# Patient Record
Sex: MALE | Race: WHITE | NOT HISPANIC OR LATINO | Employment: OTHER | URBAN - METROPOLITAN AREA
[De-identification: names, ages, dates, MRNs, and addresses within clinical notes are randomized per-mention and may not be internally consistent; named-entity substitution may affect disease eponyms.]

---

## 2017-01-03 RX ORDER — TRIAMTERENE AND HYDROCHLOROTHIAZIDE 37.5; 25 MG/1; MG/1
CAPSULE ORAL
Qty: 30 CAPSULE | Refills: 2 | Status: SHIPPED | OUTPATIENT
Start: 2017-01-03 | End: 2017-04-17 | Stop reason: SDUPTHER

## 2017-01-03 RX ORDER — PRAVASTATIN SODIUM 40 MG
TABLET ORAL
Qty: 30 TABLET | Refills: 2 | Status: SHIPPED | OUTPATIENT
Start: 2017-01-03 | End: 2017-04-03 | Stop reason: SDUPTHER

## 2017-01-16 ENCOUNTER — TELEPHONE (OUTPATIENT)
Dept: INTERNAL MEDICINE | Facility: CLINIC | Age: 82
End: 2017-01-16

## 2017-01-16 RX ORDER — EMPAGLIFLOZIN 25 MG/1
TABLET, FILM COATED ORAL
Qty: 30 TABLET | Refills: 0 | Status: SHIPPED | OUTPATIENT
Start: 2017-01-16 | End: 2017-05-08

## 2017-01-24 RX ORDER — ALLOPURINOL 300 MG/1
TABLET ORAL
Qty: 30 TABLET | Refills: 1 | Status: SHIPPED | OUTPATIENT
Start: 2017-01-24 | End: 2017-04-17 | Stop reason: SDUPTHER

## 2017-02-13 RX ORDER — SACCHAROMYCES BOULARDII 250 MG
CAPSULE ORAL
Qty: 60 CAPSULE | Refills: 3 | Status: SHIPPED | OUTPATIENT
Start: 2017-02-13 | End: 2019-07-12 | Stop reason: SDUPTHER

## 2017-02-20 ENCOUNTER — LAB (OUTPATIENT)
Dept: LAB | Facility: HOSPITAL | Age: 82
End: 2017-02-20

## 2017-02-20 DIAGNOSIS — C61 PROSTATE CANCER (HCC): ICD-10-CM

## 2017-02-20 LAB
ALBUMIN SERPL-MCNC: 4.4 G/DL (ref 3.2–4.8)
ALBUMIN/GLOB SERPL: 1.7 G/DL (ref 1.5–2.5)
ALP SERPL-CCNC: 31 U/L (ref 25–100)
ALT SERPL W P-5'-P-CCNC: 28 U/L (ref 7–40)
ANION GAP SERPL CALCULATED.3IONS-SCNC: 8 MMOL/L (ref 3–11)
AST SERPL-CCNC: 30 U/L (ref 0–33)
BASOPHILS # BLD AUTO: 0.02 10*3/MM3 (ref 0–0.2)
BASOPHILS NFR BLD AUTO: 0.4 % (ref 0–1)
BILIRUB SERPL-MCNC: 0.7 MG/DL (ref 0.3–1.2)
BUN BLD-MCNC: 17 MG/DL (ref 9–23)
BUN/CREAT SERPL: 15.5 (ref 7–25)
CALCIUM SPEC-SCNC: 9.7 MG/DL (ref 8.7–10.4)
CHLORIDE SERPL-SCNC: 103 MMOL/L (ref 99–109)
CO2 SERPL-SCNC: 31 MMOL/L (ref 20–31)
CREAT BLD-MCNC: 1.1 MG/DL (ref 0.6–1.3)
DEPRECATED RDW RBC AUTO: 45.9 FL (ref 37–54)
EOSINOPHIL # BLD AUTO: 0.34 10*3/MM3 (ref 0.1–0.3)
EOSINOPHIL NFR BLD AUTO: 6.1 % (ref 0–3)
ERYTHROCYTE [DISTWIDTH] IN BLOOD BY AUTOMATED COUNT: 13.8 % (ref 11.3–14.5)
GFR SERPL CREATININE-BSD FRML MDRD: 65 ML/MIN/1.73
GLOBULIN UR ELPH-MCNC: 2.6 GM/DL
GLUCOSE BLD-MCNC: 185 MG/DL (ref 70–100)
HCT VFR BLD AUTO: 45.2 % (ref 38.9–50.9)
HGB BLD-MCNC: 15.1 G/DL (ref 13.1–17.5)
IMM GRANULOCYTES # BLD: 0.03 10*3/MM3 (ref 0–0.03)
IMM GRANULOCYTES NFR BLD: 0.5 % (ref 0–0.6)
LYMPHOCYTES # BLD AUTO: 1.98 10*3/MM3 (ref 0.6–4.8)
LYMPHOCYTES NFR BLD AUTO: 35.5 % (ref 24–44)
MCH RBC QN AUTO: 30.8 PG (ref 27–31)
MCHC RBC AUTO-ENTMCNC: 33.4 G/DL (ref 32–36)
MCV RBC AUTO: 92.2 FL (ref 80–99)
MONOCYTES # BLD AUTO: 0.49 10*3/MM3 (ref 0–1)
MONOCYTES NFR BLD AUTO: 8.8 % (ref 0–12)
NEUTROPHILS # BLD AUTO: 2.71 10*3/MM3 (ref 1.5–8.3)
NEUTROPHILS NFR BLD AUTO: 48.7 % (ref 41–71)
PLATELET # BLD AUTO: 185 10*3/MM3 (ref 150–450)
PMV BLD AUTO: 10.6 FL (ref 6–12)
POTASSIUM BLD-SCNC: 3.7 MMOL/L (ref 3.5–5.5)
PROT SERPL-MCNC: 7 G/DL (ref 5.7–8.2)
PSA SERPL-MCNC: 8.3 NG/ML (ref 0–4)
RBC # BLD AUTO: 4.9 10*6/MM3 (ref 4.2–5.76)
SODIUM BLD-SCNC: 142 MMOL/L (ref 132–146)
WBC NRBC COR # BLD: 5.57 10*3/MM3 (ref 3.5–10.8)

## 2017-02-20 PROCEDURE — 84153 ASSAY OF PSA TOTAL: CPT | Performed by: INTERNAL MEDICINE

## 2017-02-20 PROCEDURE — 36415 COLL VENOUS BLD VENIPUNCTURE: CPT

## 2017-02-20 PROCEDURE — 85025 COMPLETE CBC W/AUTO DIFF WBC: CPT | Performed by: INTERNAL MEDICINE

## 2017-02-20 PROCEDURE — 80053 COMPREHEN METABOLIC PANEL: CPT | Performed by: INTERNAL MEDICINE

## 2017-02-22 ENCOUNTER — OFFICE VISIT (OUTPATIENT)
Dept: ONCOLOGY | Facility: CLINIC | Age: 82
End: 2017-02-22

## 2017-02-22 VITALS
HEART RATE: 83 BPM | SYSTOLIC BLOOD PRESSURE: 131 MMHG | TEMPERATURE: 97.2 F | WEIGHT: 211 LBS | DIASTOLIC BLOOD PRESSURE: 62 MMHG | RESPIRATION RATE: 16 BRPM | BODY MASS INDEX: 34.06 KG/M2

## 2017-02-22 DIAGNOSIS — C61 PROSTATE CANCER (HCC): Primary | ICD-10-CM

## 2017-02-22 PROCEDURE — 99214 OFFICE O/P EST MOD 30 MIN: CPT | Performed by: INTERNAL MEDICINE

## 2017-02-22 NOTE — PROGRESS NOTES
DATE OF VISIT: 2/22/2017     REASON FOR VISIT:   1. Prostate cancer. Presented with stage I, [id number], PSA at diagnosis 3 status  post prostatectomy in 2000.  2. Relapsed disease with rise in PSA gradually over the last couple of years,  most recent PSA 7.1 ng/mL on 01/25/2016.      HISTORY OF PRESENT ILLNESS: The patient is a very pleasant 80-year-old  gentleman with past medical history significant for prostate cancer status post  radical prostatectomy in 2000. The patient never received adjuvant treatments,  neither radiation, GnRH or antiandrogen treatment. The patient's PSA started  to go up gradually over the last 2 years. Patient was referred to me on  06/13/2014 and I did restaging workup that came back negative including bone  scan, CAT scans chest, abdomen and pelvis. We elected to proceed with watchful  waiting. Patient is here today in scheduled followup visit.     SUBJECTIVE: The patient has been doing fairly well. He denied any fever or  chills. He denied any night sweats. He denied any diarrhea. No headaches. No  abdominal pain. No problem urinating.      REVIEW OF SYSTEMS: All the other 9 systems are reviewed by me and negative  except what is mentioned in HPI.      PAST MEDICAL HISTORY/SOCIAL HISTORY/FAMILY HISTORY: Unchanged from my prior  documentation on 06/13/2014.         Current Outpatient Prescriptions:   •  allopurinol (ZYLOPRIM) 300 MG tablet, TAKE ONE TABLET BY MOUTH DAILY, Disp: 30 tablet, Rfl: 1  •  aspirin 325 MG tablet, Take  by mouth 1 (one) time per week., Disp: , Rfl:   •  Cholecalciferol (VITAMIN D3) 2000 UNITS capsule, Take  by mouth 2 (two) times a day., Disp: , Rfl:   •  Cinnamon 500 MG capsule, Take 2,000 mg by mouth 2 (two) times a day., Disp: , Rfl:   •  DUREZOL 0.05 % ophthalmic emulsion, , Disp: , Rfl:   •  fenofibric acid (TRILIPIX) 135 MG capsule delayed-release delayed release capsule, Take 1 capsule by mouth daily., Disp: 90 capsule, Rfl: 1  •  FLORASTOR 250 MG capsule,  TAKE ONE CAPSULE BY MOUTH TWICE A DAY, Disp: 60 capsule, Rfl: 3  •  folic acid (FOLVITE) 400 MCG tablet, Take  by mouth 1 (one) time per week., Disp: , Rfl:   •  ILEVRO 0.3 % suspension, , Disp: , Rfl:   •  JANUVIA 100 MG tablet, TAKE ONE TABLET BY MOUTH DAILY, Disp: 30 tablet, Rfl: 3  •  JARDIANCE 25 MG tablet, TAKE ONE TABLET BY MOUTH DAILY, Disp: 30 tablet, Rfl: 0  •  l-methylfolate-algae-B6-B12 (METANX) 3-90.314-2-35 MG capsule capsule, Take  by mouth., Disp: , Rfl:   •  metFORMIN (GLUCOPHAGE) 500 MG tablet, Take 2 tablets by mouth 2 (Two) Times a Day With Meals., Disp: 120 tablet, Rfl: 2  •  metoprolol succinate XL (TOPROL-XL) 100 MG 24 hr tablet, Take 1 tablet by mouth daily., Disp: 90 tablet, Rfl: 1  •  mometasone (ELOCON) 0.1 % ointment, , Disp: , Rfl:   •  omega-3 acid ethyl esters (LOVAZA) 1 G capsule, Take two capsules by mouth twice a day, Disp: 120 capsule, Rfl: 3  •  pravastatin (PRAVACHOL) 40 MG tablet, TAKE ONE TABLET BY MOUTH EVERY NIGHT AT BEDTIME, Disp: 30 tablet, Rfl: 2  •  triamterene-hydrochlorothiazide (DYAZIDE) 37.5-25 MG per capsule, TAKE ONE CAPSULE BY MOUTH DAILY, Disp: 30 capsule, Rfl: 2  •  VIGAMOX 0.5 % ophthalmic solution, , Disp: , Rfl:     PHYSICAL EXAMINATION:   Visit Vitals   • /62   • Pulse 83   • Temp 97.2 °F (36.2 °C) (Temporal Artery )   • Resp 16   • Wt 211 lb (95.7 kg)   • BMI 34.06 kg/m2     ECOG1  GENERAL: Age appropriate. No acute distress.   HEENT: Head atraumatic, normocephalic.   NECK: Supple. No JVD. No lymphadenopathy.   LUNGS: Clear to auscultation bilaterally. No wheezing. No rhonchi.   HEART: Regular rate and rhythm. S1, S2, no murmurs.   ABDOMEN: Soft, nontender, nondistended. Bowel sounds positive. No  hepatosplenomegaly.   EXTREMITIES: No clubbing, cyanosis, or edema.   SKIN: No rashes. No purpura.   NEUROLOGIC: Awake and oriented x3. Strength 5 out of 5 in all muscle groups.     Lab on 02/20/2017   Component Date Value Ref Range Status   • Glucose  02/20/2017 185* 70 - 100 mg/dL Final   • BUN 02/20/2017 17  9 - 23 mg/dL Final   • Creatinine 02/20/2017 1.10  0.60 - 1.30 mg/dL Final   • Sodium 02/20/2017 142  132 - 146 mmol/L Final   • Potassium 02/20/2017 3.7  3.5 - 5.5 mmol/L Final   • Chloride 02/20/2017 103  99 - 109 mmol/L Final   • CO2 02/20/2017 31.0  20.0 - 31.0 mmol/L Final   • Calcium 02/20/2017 9.7  8.7 - 10.4 mg/dL Final   • Total Protein 02/20/2017 7.0  5.7 - 8.2 g/dL Final   • Albumin 02/20/2017 4.40  3.20 - 4.80 g/dL Final   • ALT (SGPT) 02/20/2017 28  7 - 40 U/L Final   • AST (SGOT) 02/20/2017 30  0 - 33 U/L Final   • Alkaline Phosphatase 02/20/2017 31  25 - 100 U/L Final   • Total Bilirubin 02/20/2017 0.7  0.3 - 1.2 mg/dL Final   • eGFR Non  Amer 02/20/2017 65  >60 mL/min/1.73 Final   • Globulin 02/20/2017 2.6  gm/dL Final   • A/G Ratio 02/20/2017 1.7  1.5 - 2.5 g/dL Final   • BUN/Creatinine Ratio 02/20/2017 15.5  7.0 - 25.0 Final   • Anion Gap 02/20/2017 8.0  3.0 - 11.0 mmol/L Final   • PSA 02/20/2017 8.300* 0.000 - 4.000 ng/mL Final   • WBC 02/20/2017 5.57  3.50 - 10.80 10*3/mm3 Final   • RBC 02/20/2017 4.90  4.20 - 5.76 10*6/mm3 Final   • Hemoglobin 02/20/2017 15.1  13.1 - 17.5 g/dL Final   • Hematocrit 02/20/2017 45.2  38.9 - 50.9 % Final   • MCV 02/20/2017 92.2  80.0 - 99.0 fL Final   • MCH 02/20/2017 30.8  27.0 - 31.0 pg Final   • MCHC 02/20/2017 33.4  32.0 - 36.0 g/dL Final   • RDW 02/20/2017 13.8  11.3 - 14.5 % Final   • RDW-SD 02/20/2017 45.9  37.0 - 54.0 fl Final   • MPV 02/20/2017 10.6  6.0 - 12.0 fL Final   • Platelets 02/20/2017 185  150 - 450 10*3/mm3 Final   • Neutrophil % 02/20/2017 48.7  41.0 - 71.0 % Final   • Lymphocyte % 02/20/2017 35.5  24.0 - 44.0 % Final   • Monocyte % 02/20/2017 8.8  0.0 - 12.0 % Final   • Eosinophil % 02/20/2017 6.1* 0.0 - 3.0 % Final   • Basophil % 02/20/2017 0.4  0.0 - 1.0 % Final   • Immature Grans % 02/20/2017 0.5  0.0 - 0.6 % Final   • Neutrophils, Absolute 02/20/2017 2.71  1.50 - 8.30  10*3/mm3 Final   • Lymphocytes, Absolute 02/20/2017 1.98  0.60 - 4.80 10*3/mm3 Final   • Monocytes, Absolute 02/20/2017 0.49  0.00 - 1.00 10*3/mm3 Final   • Eosinophils, Absolute 02/20/2017 0.34* 0.10 - 0.30 10*3/mm3 Final   • Basophils, Absolute 02/20/2017 0.02  0.00 - 0.20 10*3/mm3 Final   • Immature Grans, Absolute 02/20/2017 0.03  0.00 - 0.03 10*3/mm3 Final        ASSESSMENT: The patient is a very pleasant 80-year-old gentleman with relapsed  prostate cancer.     PROBLEM LIST:  1. Prostate cancer, initially presented as [id number] status post radical  prostatectomy 2000.  2. Rise in PSA gradually over the last 2 years, most recently 5.4 on  05/20/2014.  3. Multiple comorbidities including type 2 diabetes, hypertension, morbid  obesity, osteoarthritis, and hypercholesterolemia.  4. Erectile dysfunction.   5.  Family history of colon cancer     PLAN:  1. I did go over the results in detail with the patient. I explained to him that  his his PSA is a little higher at 8.3.  2. The patient will come back to see me in 3 months with repeat blood work as  well as serum PSA.   3. I will continue Viagra as needed for erectile dysfunction.   4.  We'll continue Januvia as well as metformin for type 2 diabetes  5.  We'll continue pravastatin for hypercholesterolemia  6.  Patient had his colonoscopy last week no polyps were found.  Patient need to have 5 year follow-up study which would be due 11/2021.  7.  I will start patient on treatment if his PSA goes above 10.  He will need to have staging workup with CAT scan abdomen and pelvis as well as bone scan.  Patient will be started on Lupron with Casodex bridge.    Shannan Ramon MD  2/22/2017

## 2017-03-14 RX ORDER — FENOFIBRIC ACID 135 MG/1
CAPSULE, DELAYED RELEASE ORAL
Qty: 30 CAPSULE | Refills: 0 | Status: SHIPPED | OUTPATIENT
Start: 2017-03-14 | End: 2017-04-17 | Stop reason: SDUPTHER

## 2017-03-23 ENCOUNTER — HOSPITAL ENCOUNTER (OUTPATIENT)
Dept: CT IMAGING | Facility: HOSPITAL | Age: 82
Discharge: HOME | End: 2017-03-23
Attending: INTERNAL MEDICINE | Admitting: INTERNAL MEDICINE

## 2017-03-23 ENCOUNTER — OFFICE VISIT (OUTPATIENT)
Dept: INTERNAL MEDICINE | Facility: CLINIC | Age: 82
End: 2017-03-23

## 2017-03-23 VITALS
WEIGHT: 210 LBS | OXYGEN SATURATION: 96 % | HEART RATE: 78 BPM | DIASTOLIC BLOOD PRESSURE: 70 MMHG | BODY MASS INDEX: 33.89 KG/M2 | SYSTOLIC BLOOD PRESSURE: 118 MMHG

## 2017-03-23 DIAGNOSIS — G47.33 OBSTRUCTIVE SLEEP APNEA SYNDROME: ICD-10-CM

## 2017-03-23 DIAGNOSIS — R10.9 LEFT FLANK PAIN: ICD-10-CM

## 2017-03-23 DIAGNOSIS — C61 PROSTATE CANCER (HCC): ICD-10-CM

## 2017-03-23 DIAGNOSIS — I10 ESSENTIAL HYPERTENSION: ICD-10-CM

## 2017-03-23 DIAGNOSIS — Z00.00 MEDICARE ANNUAL WELLNESS VISIT, SUBSEQUENT: Primary | ICD-10-CM

## 2017-03-23 LAB — CREAT BLDA-MCNC: 1.3 MG/DL (ref 0.6–1.3)

## 2017-03-23 PROCEDURE — G0439 PPPS, SUBSEQ VISIT: HCPCS | Performed by: INTERNAL MEDICINE

## 2017-03-23 PROCEDURE — 74177 CT ABD & PELVIS W/CONTRAST: CPT

## 2017-03-23 PROCEDURE — G0444 DEPRESSION SCREEN ANNUAL: HCPCS | Performed by: INTERNAL MEDICINE

## 2017-03-23 PROCEDURE — 0 IOPAMIDOL 61 % SOLUTION: Performed by: INTERNAL MEDICINE

## 2017-03-23 PROCEDURE — 82565 ASSAY OF CREATININE: CPT

## 2017-03-23 PROCEDURE — 99397 PER PM REEVAL EST PAT 65+ YR: CPT | Performed by: INTERNAL MEDICINE

## 2017-03-23 PROCEDURE — 96160 PT-FOCUSED HLTH RISK ASSMT: CPT | Performed by: INTERNAL MEDICINE

## 2017-03-23 RX ORDER — EMPAGLIFLOZIN 10 MG/1
10 TABLET, FILM COATED ORAL DAILY
COMMUNITY
Start: 2017-01-16 | End: 2020-12-01 | Stop reason: SDUPTHER

## 2017-03-23 RX ADMIN — BARIUM SULFATE 450 ML: 21 SUSPENSION ORAL at 12:00

## 2017-03-23 RX ADMIN — IOPAMIDOL 95 ML: 612 INJECTION, SOLUTION INTRAVENOUS at 13:20

## 2017-03-23 NOTE — PROGRESS NOTES
ANNUAL WELLNESS VISIT    DRUG AND ALCOHOL USE      caffeine intake: 3 cups of caffeinated coffee per day    DIET AND PHYSICAL ACTIVITY     Diet: general    Exercise: rarely   Exercise Details: does not exercise     MOOD DISORDER AND COGNITIVE SCREENING   Depression Screening Tool Used yes - see PHQ-9   Anxiety Screening Tool Used yes     Mini-Cog Performed   Yes    1. Tell Patient 3 Words apple table precious    2. Administer Clock Test     3. Recall 3 words  apple table precious    4. Number Correct Items 3    FUNCTIONAL ABILITY AND LEVEL OF SAFETY   Hearing no hearing loss     Wears Hearing Aids No       Current Activities Independent      none  - see Funct/Cog Status Intake     Fall Risk Assessment       Has difficulty with walking or balance  Yes         Timed Up and Go (TUG) Test  9 sec.       If >12 sec, normal    ADVANCED DIRECTIVE has an advanced directive - a copy has been provided and is in file    PAIN SCREENING Do you have pain right now? no      If so, 1-10 scale:      \     Do you have pain every day? No      Probable chronic pain: No     Recent Hospitalizations:  No recent hospitalization(s)..     MEDICATION REVIEW   - updated and reviewed (see Medication List).   - reviewed for potentially harmful drug-disease interactions in the elderly.   - reviewed for high risk medications in the elderly.   - aspirin use: Yes             Chief Complaint   Patient presents with   • Subsequent Medicare Wellness       History of Present Illness  81 y.o.  presents for wellness exam  Left flank pain for weeks. No nausea or vomiting. No fever. Hx of prostate cancer.    Review of Systems   Constitutional: Negative for activity change, appetite change, chills, diaphoresis, fatigue, fever and unexpected weight change.   HENT: Negative for congestion, ear discharge, ear pain, mouth sores, nosebleeds, sinus pressure, sneezing and sore throat.    Eyes: Negative for pain, discharge and itching.   Respiratory: Negative for cough,  chest tightness, shortness of breath and wheezing.    Cardiovascular: Negative for chest pain, palpitations and leg swelling.   Gastrointestinal: Negative for abdominal pain, constipation, diarrhea, nausea and vomiting.   Endocrine: Negative for cold intolerance, heat intolerance, polydipsia and polyphagia.   Genitourinary: Negative for dysuria, flank pain, frequency, hematuria and urgency.   Musculoskeletal: Negative for arthralgias, back pain, gait problem, myalgias, neck pain and neck stiffness.        Left flank pain   Skin: Negative for color change, pallor and rash.   Neurological: Negative for seizures, speech difficulty, numbness and headaches.   Psychiatric/Behavioral: Negative for agitation, confusion, decreased concentration and sleep disturbance. The patient is not nervous/anxious.       All other ROS reviewed and negative.    [facility]  The following portions of the patient's history were reviewed and updated as appropriate: allergies, current medications, past family history, past medical history, past social history, past surgical history and problem list.      Current Outpatient Prescriptions:   •  allopurinol (ZYLOPRIM) 300 MG tablet, TAKE ONE TABLET BY MOUTH DAILY, Disp: 30 tablet, Rfl: 1  •  Cholecalciferol (VITAMIN D3) 2000 UNITS capsule, Take  by mouth 2 (two) times a day., Disp: , Rfl:   •  fenofibric acid (TRILIPIX) 135 MG capsule delayed-release delayed release capsule, TAKE ONE CAPSULE BY MOUTH DAILY, Disp: 30 capsule, Rfl: 0  •  FLORASTOR 250 MG capsule, TAKE ONE CAPSULE BY MOUTH TWICE A DAY, Disp: 60 capsule, Rfl: 3  •  JANUVIA 100 MG tablet, TAKE ONE TABLET BY MOUTH DAILY, Disp: 30 tablet, Rfl: 3  •  JARDIANCE 25 MG tablet, TAKE ONE TABLET BY MOUTH DAILY, Disp: 30 tablet, Rfl: 0  •  l-methylfolate-algae-B6-B12 (METANX) 3-90.314-2-35 MG capsule capsule, Take  by mouth., Disp: , Rfl:   •  metFORMIN (GLUCOPHAGE) 500 MG tablet, Take 2 tablets by mouth 2 (Two) Times a Day With Meals., Disp: 120  tablet, Rfl: 2  •  metoprolol succinate XL (TOPROL-XL) 100 MG 24 hr tablet, Take 1 tablet by mouth daily., Disp: 90 tablet, Rfl: 1  •  mometasone (ELOCON) 0.1 % ointment, , Disp: , Rfl:   •  omega-3 acid ethyl esters (LOVAZA) 1 G capsule, Take two capsules by mouth twice a day, Disp: 120 capsule, Rfl: 3  •  pravastatin (PRAVACHOL) 40 MG tablet, TAKE ONE TABLET BY MOUTH EVERY NIGHT AT BEDTIME, Disp: 30 tablet, Rfl: 2  •  triamterene-hydrochlorothiazide (DYAZIDE) 37.5-25 MG per capsule, TAKE ONE CAPSULE BY MOUTH DAILY, Disp: 30 capsule, Rfl: 2  •  aspirin 325 MG tablet, Take  by mouth 1 (one) time per week., Disp: , Rfl:   •  CRISTINA CONTOUR TEST test strip, , Disp: , Rfl:   •  Cinnamon 500 MG capsule, Take 2,000 mg by mouth Daily., Disp: , Rfl:   •  DUREZOL 0.05 % ophthalmic emulsion, , Disp: , Rfl:   •  folic acid (FOLVITE) 400 MCG tablet, Take  by mouth 1 (one) time per week., Disp: , Rfl:   •  ILEVRO 0.3 % suspension, , Disp: , Rfl:   •  JARDIANCE 10 MG tablet, , Disp: , Rfl:   •  VIGAMOX 0.5 % ophthalmic solution, , Disp: , Rfl:     VITALS:  /70  Pulse 78  Wt 210 lb (95.3 kg)  SpO2 96%  BMI 33.89 kg/m2    Physical Exam   Constitutional: He appears well-developed.   HENT:   Head: Normocephalic.   Right Ear: External ear normal.   Left Ear: External ear normal.   Nose: Nose normal.   Mouth/Throat: Oropharynx is clear and moist.   Eyes: Conjunctivae are normal. Pupils are equal, round, and reactive to light.   Neck: No JVD present. No thyromegaly present.   Cardiovascular: Normal rate, regular rhythm and normal heart sounds.  Exam reveals no friction rub.    No murmur heard.  Pulmonary/Chest: Effort normal and breath sounds normal. No respiratory distress. He has no wheezes. He has no rales.   Abdominal: Soft. He exhibits no distension. There is no tenderness. There is no guarding.   Musculoskeletal: He exhibits no edema or tenderness.   Lymphadenopathy:     He has no cervical adenopathy.   Neurological: He  displays normal reflexes. No cranial nerve deficit.   Skin: No rash noted.   Psychiatric: His behavior is normal.   Nursing note and vitals reviewed.      LABS  Results for orders placed or performed in visit on 02/20/17   Comprehensive Metabolic Panel   Result Value Ref Range    Glucose 185 (H) 70 - 100 mg/dL    BUN 17 9 - 23 mg/dL    Creatinine 1.10 0.60 - 1.30 mg/dL    Sodium 142 132 - 146 mmol/L    Potassium 3.7 3.5 - 5.5 mmol/L    Chloride 103 99 - 109 mmol/L    CO2 31.0 20.0 - 31.0 mmol/L    Calcium 9.7 8.7 - 10.4 mg/dL    Total Protein 7.0 5.7 - 8.2 g/dL    Albumin 4.40 3.20 - 4.80 g/dL    ALT (SGPT) 28 7 - 40 U/L    AST (SGOT) 30 0 - 33 U/L    Alkaline Phosphatase 31 25 - 100 U/L    Total Bilirubin 0.7 0.3 - 1.2 mg/dL    eGFR Non African Amer 65 >60 mL/min/1.73    Globulin 2.6 gm/dL    A/G Ratio 1.7 1.5 - 2.5 g/dL    BUN/Creatinine Ratio 15.5 7.0 - 25.0    Anion Gap 8.0 3.0 - 11.0 mmol/L   PSA   Result Value Ref Range    PSA 8.300 (H) 0.000 - 4.000 ng/mL   CBC Auto Differential   Result Value Ref Range    WBC 5.57 3.50 - 10.80 10*3/mm3    RBC 4.90 4.20 - 5.76 10*6/mm3    Hemoglobin 15.1 13.1 - 17.5 g/dL    Hematocrit 45.2 38.9 - 50.9 %    MCV 92.2 80.0 - 99.0 fL    MCH 30.8 27.0 - 31.0 pg    MCHC 33.4 32.0 - 36.0 g/dL    RDW 13.8 11.3 - 14.5 %    RDW-SD 45.9 37.0 - 54.0 fl    MPV 10.6 6.0 - 12.0 fL    Platelets 185 150 - 450 10*3/mm3    Neutrophil % 48.7 41.0 - 71.0 %    Lymphocyte % 35.5 24.0 - 44.0 %    Monocyte % 8.8 0.0 - 12.0 %    Eosinophil % 6.1 (H) 0.0 - 3.0 %    Basophil % 0.4 0.0 - 1.0 %    Immature Grans % 0.5 0.0 - 0.6 %    Neutrophils, Absolute 2.71 1.50 - 8.30 10*3/mm3    Lymphocytes, Absolute 1.98 0.60 - 4.80 10*3/mm3    Monocytes, Absolute 0.49 0.00 - 1.00 10*3/mm3    Eosinophils, Absolute 0.34 (H) 0.10 - 0.30 10*3/mm3    Basophils, Absolute 0.02 0.00 - 0.20 10*3/mm3    Immature Grans, Absolute 0.03 0.00 - 0.03 10*3/mm3       ASSESSMENT/PLAN  Problem List Items Addressed This Visit         Cardiovascular and Mediastinum    Essential hypertension       Genitourinary    Prostate cancer       Other    Sleep apnea      Other Visit Diagnoses     Medicare annual wellness visit, subsequent    -  Primary      Prostate cancer is being followed by [facility]. Last psa was 8. He has repeat psa verna in May.  HTN and Sleep apnea are well controlled.    FOLLOW-UP  6m

## 2017-04-03 RX ORDER — PRAVASTATIN SODIUM 40 MG
TABLET ORAL
Qty: 30 TABLET | Refills: 1 | Status: SHIPPED | OUTPATIENT
Start: 2017-04-03 | End: 2017-06-05 | Stop reason: SDUPTHER

## 2017-04-17 RX ORDER — FENOFIBRIC ACID 135 MG/1
CAPSULE, DELAYED RELEASE ORAL
Qty: 30 CAPSULE | Refills: 3 | Status: SHIPPED | OUTPATIENT
Start: 2017-04-17 | End: 2017-08-14 | Stop reason: SDUPTHER

## 2017-04-17 RX ORDER — TRIAMTERENE AND HYDROCHLOROTHIAZIDE 37.5; 25 MG/1; MG/1
CAPSULE ORAL
Qty: 30 CAPSULE | Refills: 3 | Status: SHIPPED | OUTPATIENT
Start: 2017-04-17 | End: 2017-09-25 | Stop reason: SDUPTHER

## 2017-04-17 RX ORDER — METOPROLOL SUCCINATE 100 MG/1
TABLET, EXTENDED RELEASE ORAL
Qty: 30 TABLET | Refills: 3 | Status: SHIPPED | OUTPATIENT
Start: 2017-04-17 | End: 2017-09-18 | Stop reason: SDUPTHER

## 2017-04-17 RX ORDER — ALLOPURINOL 300 MG/1
TABLET ORAL
Qty: 30 TABLET | Refills: 3 | Status: SHIPPED | OUTPATIENT
Start: 2017-04-17 | End: 2017-09-25 | Stop reason: SDUPTHER

## 2017-05-08 ENCOUNTER — OFFICE VISIT (OUTPATIENT)
Dept: CARDIOLOGY | Facility: CLINIC | Age: 82
End: 2017-05-08

## 2017-05-08 VITALS
SYSTOLIC BLOOD PRESSURE: 114 MMHG | RESPIRATION RATE: 16 BRPM | OXYGEN SATURATION: 97 % | BODY MASS INDEX: 33.59 KG/M2 | HEIGHT: 66 IN | DIASTOLIC BLOOD PRESSURE: 62 MMHG | HEART RATE: 78 BPM | WEIGHT: 209 LBS

## 2017-05-08 DIAGNOSIS — I10 ESSENTIAL HYPERTENSION: Primary | ICD-10-CM

## 2017-05-08 PROCEDURE — 99213 OFFICE O/P EST LOW 20 MIN: CPT | Performed by: INTERNAL MEDICINE

## 2017-05-08 RX ORDER — GLUCOSAMINE HCL 500 MG
2 TABLET ORAL DAILY
COMMUNITY
End: 2022-06-13

## 2017-05-08 RX ORDER — MULTIVIT WITH MINERALS/LUTEIN
1000 TABLET ORAL DAILY
COMMUNITY
End: 2019-11-04

## 2017-05-08 RX ORDER — TEMAZEPAM 15 MG/1
15 CAPSULE ORAL NIGHTLY PRN
COMMUNITY
End: 2017-07-21

## 2017-05-08 RX ORDER — DIAPER,BRIEF,ADULT, DISPOSABLE
1200 EACH MISCELLANEOUS DAILY
COMMUNITY
End: 2021-12-06

## 2017-05-08 RX ORDER — MULTIVIT WITH MINERALS/LUTEIN
1000 TABLET ORAL DAILY
COMMUNITY
End: 2019-07-12

## 2017-05-22 ENCOUNTER — LAB (OUTPATIENT)
Dept: LAB | Facility: HOSPITAL | Age: 82
End: 2017-05-22

## 2017-05-22 DIAGNOSIS — C61 PROSTATE CANCER (HCC): ICD-10-CM

## 2017-05-22 LAB
ALBUMIN SERPL-MCNC: 4.6 G/DL (ref 3.2–4.8)
ALBUMIN/GLOB SERPL: 1.8 G/DL (ref 1.5–2.5)
ALP SERPL-CCNC: 37 U/L (ref 25–100)
ALT SERPL W P-5'-P-CCNC: 24 U/L (ref 7–40)
ANION GAP SERPL CALCULATED.3IONS-SCNC: 7 MMOL/L (ref 3–11)
AST SERPL-CCNC: 23 U/L (ref 0–33)
BASOPHILS # BLD AUTO: 0.02 10*3/MM3 (ref 0–0.2)
BASOPHILS NFR BLD AUTO: 0.4 % (ref 0–1)
BILIRUB SERPL-MCNC: 0.7 MG/DL (ref 0.3–1.2)
BUN BLD-MCNC: 18 MG/DL (ref 9–23)
BUN/CREAT SERPL: 13.8 (ref 7–25)
CALCIUM SPEC-SCNC: 9.9 MG/DL (ref 8.7–10.4)
CHLORIDE SERPL-SCNC: 105 MMOL/L (ref 99–109)
CO2 SERPL-SCNC: 30 MMOL/L (ref 20–31)
CREAT BLD-MCNC: 1.3 MG/DL (ref 0.6–1.3)
DEPRECATED RDW RBC AUTO: 46.3 FL (ref 37–54)
EOSINOPHIL # BLD AUTO: 0.23 10*3/MM3 (ref 0.1–0.3)
EOSINOPHIL NFR BLD AUTO: 4.2 % (ref 0–3)
ERYTHROCYTE [DISTWIDTH] IN BLOOD BY AUTOMATED COUNT: 13.6 % (ref 11.3–14.5)
GFR SERPL CREATININE-BSD FRML MDRD: 53 ML/MIN/1.73
GLOBULIN UR ELPH-MCNC: 2.5 GM/DL
GLUCOSE BLD-MCNC: 208 MG/DL (ref 70–100)
HCT VFR BLD AUTO: 47 % (ref 38.9–50.9)
HGB BLD-MCNC: 15.4 G/DL (ref 13.1–17.5)
IMM GRANULOCYTES # BLD: 0.02 10*3/MM3 (ref 0–0.03)
IMM GRANULOCYTES NFR BLD: 0.4 % (ref 0–0.6)
LYMPHOCYTES # BLD AUTO: 1.52 10*3/MM3 (ref 0.6–4.8)
LYMPHOCYTES NFR BLD AUTO: 28 % (ref 24–44)
MCH RBC QN AUTO: 30.6 PG (ref 27–31)
MCHC RBC AUTO-ENTMCNC: 32.8 G/DL (ref 32–36)
MCV RBC AUTO: 93.3 FL (ref 80–99)
MONOCYTES # BLD AUTO: 0.52 10*3/MM3 (ref 0–1)
MONOCYTES NFR BLD AUTO: 9.6 % (ref 0–12)
NEUTROPHILS # BLD AUTO: 3.11 10*3/MM3 (ref 1.5–8.3)
NEUTROPHILS NFR BLD AUTO: 57.4 % (ref 41–71)
PLATELET # BLD AUTO: 173 10*3/MM3 (ref 150–450)
PMV BLD AUTO: 10.8 FL (ref 6–12)
POTASSIUM BLD-SCNC: 4 MMOL/L (ref 3.5–5.5)
PROT SERPL-MCNC: 7.1 G/DL (ref 5.7–8.2)
PSA SERPL-MCNC: 8.91 NG/ML (ref 0–4)
RBC # BLD AUTO: 5.04 10*6/MM3 (ref 4.2–5.76)
SODIUM BLD-SCNC: 142 MMOL/L (ref 132–146)
WBC NRBC COR # BLD: 5.42 10*3/MM3 (ref 3.5–10.8)

## 2017-05-22 PROCEDURE — 85025 COMPLETE CBC W/AUTO DIFF WBC: CPT | Performed by: INTERNAL MEDICINE

## 2017-05-22 PROCEDURE — 84153 ASSAY OF PSA TOTAL: CPT | Performed by: INTERNAL MEDICINE

## 2017-05-22 PROCEDURE — 36415 COLL VENOUS BLD VENIPUNCTURE: CPT

## 2017-05-22 PROCEDURE — 80053 COMPREHEN METABOLIC PANEL: CPT | Performed by: INTERNAL MEDICINE

## 2017-05-24 ENCOUNTER — OFFICE VISIT (OUTPATIENT)
Dept: ONCOLOGY | Facility: CLINIC | Age: 82
End: 2017-05-24

## 2017-05-24 VITALS
HEART RATE: 73 BPM | DIASTOLIC BLOOD PRESSURE: 64 MMHG | SYSTOLIC BLOOD PRESSURE: 132 MMHG | BODY MASS INDEX: 33.73 KG/M2 | WEIGHT: 209 LBS | RESPIRATION RATE: 17 BRPM | TEMPERATURE: 97.8 F

## 2017-05-24 DIAGNOSIS — C61 PROSTATE CANCER (HCC): Primary | ICD-10-CM

## 2017-05-24 PROCEDURE — 99214 OFFICE O/P EST MOD 30 MIN: CPT | Performed by: INTERNAL MEDICINE

## 2017-05-24 RX ORDER — SILDENAFIL 50 MG/1
50 TABLET, FILM COATED ORAL DAILY PRN
Qty: 10 TABLET | Refills: 5 | Status: SHIPPED | OUTPATIENT
Start: 2017-05-24 | End: 2018-03-30

## 2017-06-06 RX ORDER — PRAVASTATIN SODIUM 40 MG
TABLET ORAL
Qty: 30 TABLET | Refills: 0 | Status: SHIPPED | OUTPATIENT
Start: 2017-06-06 | End: 2017-07-10 | Stop reason: SDUPTHER

## 2017-06-07 ENCOUNTER — HOSPITAL ENCOUNTER (EMERGENCY)
Facility: HOSPITAL | Age: 82
Discharge: HOME | End: 2017-06-08
Attending: EMERGENCY MEDICINE | Admitting: EMERGENCY MEDICINE

## 2017-06-07 DIAGNOSIS — M47.812 SPONDYLOSIS OF CERVICAL REGION WITHOUT MYELOPATHY OR RADICULOPATHY: ICD-10-CM

## 2017-06-07 DIAGNOSIS — S16.1XXA NECK STRAIN, INITIAL ENCOUNTER: ICD-10-CM

## 2017-06-07 DIAGNOSIS — M54.2 NECK PAIN: Primary | ICD-10-CM

## 2017-06-07 LAB
BASOPHILS # BLD AUTO: 0.03 10*3/MM3 (ref 0–0.2)
BASOPHILS NFR BLD AUTO: 0.3 % (ref 0–1)
DEPRECATED RDW RBC AUTO: 46.8 FL (ref 37–54)
EOSINOPHIL # BLD AUTO: 0.23 10*3/MM3 (ref 0.1–0.3)
EOSINOPHIL NFR BLD AUTO: 2.3 % (ref 0–3)
ERYTHROCYTE [DISTWIDTH] IN BLOOD BY AUTOMATED COUNT: 13.7 % (ref 11.3–14.5)
HCT VFR BLD AUTO: 48.7 % (ref 38.9–50.9)
HGB BLD-MCNC: 16 G/DL (ref 13.1–17.5)
IMM GRANULOCYTES # BLD: 0.02 10*3/MM3 (ref 0–0.03)
IMM GRANULOCYTES NFR BLD: 0.2 % (ref 0–0.6)
LYMPHOCYTES # BLD AUTO: 1.61 10*3/MM3 (ref 0.6–4.8)
LYMPHOCYTES NFR BLD AUTO: 15.9 % (ref 24–44)
MCH RBC QN AUTO: 30.8 PG (ref 27–31)
MCHC RBC AUTO-ENTMCNC: 32.9 G/DL (ref 32–36)
MCV RBC AUTO: 93.7 FL (ref 80–99)
MONOCYTES # BLD AUTO: 0.92 10*3/MM3 (ref 0–1)
MONOCYTES NFR BLD AUTO: 9.1 % (ref 0–12)
NEUTROPHILS # BLD AUTO: 7.3 10*3/MM3 (ref 1.5–8.3)
NEUTROPHILS NFR BLD AUTO: 72.2 % (ref 41–71)
PLATELET # BLD AUTO: 182 10*3/MM3 (ref 150–450)
PMV BLD AUTO: 10.4 FL (ref 6–12)
RBC # BLD AUTO: 5.2 10*6/MM3 (ref 4.2–5.76)
WBC NRBC COR # BLD: 10.11 10*3/MM3 (ref 3.5–10.8)

## 2017-06-07 PROCEDURE — 96375 TX/PRO/DX INJ NEW DRUG ADDON: CPT

## 2017-06-07 PROCEDURE — 25010000002 ONDANSETRON PER 1 MG: Performed by: EMERGENCY MEDICINE

## 2017-06-07 PROCEDURE — 85025 COMPLETE CBC W/AUTO DIFF WBC: CPT | Performed by: EMERGENCY MEDICINE

## 2017-06-07 PROCEDURE — 25010000002 HYDROMORPHONE PER 4 MG: Performed by: EMERGENCY MEDICINE

## 2017-06-07 PROCEDURE — 96374 THER/PROPH/DIAG INJ IV PUSH: CPT

## 2017-06-07 PROCEDURE — 80053 COMPREHEN METABOLIC PANEL: CPT | Performed by: EMERGENCY MEDICINE

## 2017-06-07 PROCEDURE — 99284 EMERGENCY DEPT VISIT MOD MDM: CPT

## 2017-06-07 PROCEDURE — 96376 TX/PRO/DX INJ SAME DRUG ADON: CPT

## 2017-06-07 RX ORDER — SODIUM CHLORIDE 0.9 % (FLUSH) 0.9 %
10 SYRINGE (ML) INJECTION AS NEEDED
Status: DISCONTINUED | OUTPATIENT
Start: 2017-06-07 | End: 2017-06-08 | Stop reason: HOSPADM

## 2017-06-07 RX ORDER — CYCLOBENZAPRINE HCL 10 MG
10 TABLET ORAL ONCE
Status: COMPLETED | OUTPATIENT
Start: 2017-06-07 | End: 2017-06-07

## 2017-06-07 RX ORDER — HYDROMORPHONE HYDROCHLORIDE 1 MG/ML
0.25 INJECTION, SOLUTION INTRAMUSCULAR; INTRAVENOUS; SUBCUTANEOUS ONCE
Status: COMPLETED | OUTPATIENT
Start: 2017-06-07 | End: 2017-06-07

## 2017-06-07 RX ORDER — ONDANSETRON 2 MG/ML
4 INJECTION INTRAMUSCULAR; INTRAVENOUS ONCE
Status: COMPLETED | OUTPATIENT
Start: 2017-06-07 | End: 2017-06-07

## 2017-06-07 RX ADMIN — HYDROMORPHONE HYDROCHLORIDE 0.25 MG: 1 INJECTION, SOLUTION INTRAMUSCULAR; INTRAVENOUS; SUBCUTANEOUS at 23:55

## 2017-06-07 RX ADMIN — CYCLOBENZAPRINE HYDROCHLORIDE 10 MG: 10 TABLET, FILM COATED ORAL at 23:55

## 2017-06-07 RX ADMIN — ONDANSETRON 4 MG: 2 INJECTION INTRAMUSCULAR; INTRAVENOUS at 23:55

## 2017-06-08 ENCOUNTER — APPOINTMENT (OUTPATIENT)
Dept: MRI IMAGING | Facility: HOSPITAL | Age: 82
End: 2017-06-08

## 2017-06-08 VITALS
RESPIRATION RATE: 20 BRPM | HEART RATE: 78 BPM | OXYGEN SATURATION: 92 % | SYSTOLIC BLOOD PRESSURE: 163 MMHG | BODY MASS INDEX: 32.18 KG/M2 | WEIGHT: 205 LBS | TEMPERATURE: 97.7 F | DIASTOLIC BLOOD PRESSURE: 86 MMHG | HEIGHT: 67 IN

## 2017-06-08 LAB
ALBUMIN SERPL-MCNC: 5.2 G/DL (ref 3.2–4.8)
ALBUMIN/GLOB SERPL: 1.5 G/DL (ref 1.5–2.5)
ALP SERPL-CCNC: 39 U/L (ref 25–100)
ALT SERPL W P-5'-P-CCNC: 19 U/L (ref 7–40)
ANION GAP SERPL CALCULATED.3IONS-SCNC: 8 MMOL/L (ref 3–11)
AST SERPL-CCNC: 25 U/L (ref 0–33)
BILIRUB SERPL-MCNC: 0.8 MG/DL (ref 0.3–1.2)
BUN BLD-MCNC: 16 MG/DL (ref 9–23)
BUN/CREAT SERPL: 14.5 (ref 7–25)
CALCIUM SPEC-SCNC: 10.3 MG/DL (ref 8.7–10.4)
CHLORIDE SERPL-SCNC: 102 MMOL/L (ref 99–109)
CO2 SERPL-SCNC: 27 MMOL/L (ref 20–31)
CREAT BLD-MCNC: 1.1 MG/DL (ref 0.6–1.3)
GFR SERPL CREATININE-BSD FRML MDRD: 65 ML/MIN/1.73
GLOBULIN UR ELPH-MCNC: 3.4 GM/DL
GLUCOSE BLD-MCNC: 215 MG/DL (ref 70–100)
POTASSIUM BLD-SCNC: 4.4 MMOL/L (ref 3.5–5.5)
PROT SERPL-MCNC: 8.6 G/DL (ref 5.7–8.2)
SODIUM BLD-SCNC: 137 MMOL/L (ref 132–146)

## 2017-06-08 PROCEDURE — 96376 TX/PRO/DX INJ SAME DRUG ADON: CPT

## 2017-06-08 PROCEDURE — 63710000001 PREDNISONE PER 1 MG: Performed by: EMERGENCY MEDICINE

## 2017-06-08 PROCEDURE — 0 GADOBENATE DIMEGLUMINE 529 MG/ML SOLUTION: Performed by: EMERGENCY MEDICINE

## 2017-06-08 PROCEDURE — A9577 INJ MULTIHANCE: HCPCS | Performed by: EMERGENCY MEDICINE

## 2017-06-08 PROCEDURE — 72156 MRI NECK SPINE W/O & W/DYE: CPT

## 2017-06-08 PROCEDURE — 25010000002 HYDROMORPHONE PER 4 MG: Performed by: EMERGENCY MEDICINE

## 2017-06-08 RX ORDER — HYDROMORPHONE HYDROCHLORIDE 1 MG/ML
0.25 INJECTION, SOLUTION INTRAMUSCULAR; INTRAVENOUS; SUBCUTANEOUS ONCE
Status: COMPLETED | OUTPATIENT
Start: 2017-06-08 | End: 2017-06-08

## 2017-06-08 RX ORDER — METHYLPREDNISOLONE 4 MG/1
TABLET ORAL
Qty: 21 TABLET | Refills: 0 | Status: SHIPPED | OUTPATIENT
Start: 2017-06-08 | End: 2017-07-21 | Stop reason: SINTOL

## 2017-06-08 RX ORDER — HYDROMORPHONE HYDROCHLORIDE 1 MG/ML
0.5 INJECTION, SOLUTION INTRAMUSCULAR; INTRAVENOUS; SUBCUTANEOUS ONCE
Status: COMPLETED | OUTPATIENT
Start: 2017-06-08 | End: 2017-06-08

## 2017-06-08 RX ORDER — IBUPROFEN 600 MG/1
600 TABLET ORAL EVERY 8 HOURS PRN
Qty: 20 TABLET | Refills: 0 | Status: SHIPPED | OUTPATIENT
Start: 2017-06-08 | End: 2017-07-21

## 2017-06-08 RX ORDER — PREDNISONE 20 MG/1
60 TABLET ORAL ONCE
Status: COMPLETED | OUTPATIENT
Start: 2017-06-08 | End: 2017-06-08

## 2017-06-08 RX ORDER — IBUPROFEN 600 MG/1
600 TABLET ORAL ONCE
Status: COMPLETED | OUTPATIENT
Start: 2017-06-08 | End: 2017-06-08

## 2017-06-08 RX ORDER — CYCLOBENZAPRINE HCL 10 MG
10 TABLET ORAL 3 TIMES DAILY PRN
Qty: 20 TABLET | Refills: 0 | Status: SHIPPED | OUTPATIENT
Start: 2017-06-08 | End: 2017-08-30

## 2017-06-08 RX ORDER — OXYCODONE HYDROCHLORIDE AND ACETAMINOPHEN 5; 325 MG/1; MG/1
1-2 TABLET ORAL EVERY 6 HOURS PRN
Qty: 24 TABLET | Refills: 0 | Status: SHIPPED | OUTPATIENT
Start: 2017-06-08 | End: 2017-08-30

## 2017-06-08 RX ADMIN — IBUPROFEN 600 MG: 600 TABLET, FILM COATED ORAL at 03:39

## 2017-06-08 RX ADMIN — PREDNISONE 60 MG: 20 TABLET ORAL at 03:25

## 2017-06-08 RX ADMIN — HYDROMORPHONE HYDROCHLORIDE 0.5 MG: 1 INJECTION, SOLUTION INTRAMUSCULAR; INTRAVENOUS; SUBCUTANEOUS at 03:11

## 2017-06-08 RX ADMIN — GADOBENATE DIMEGLUMINE 19 ML: 529 INJECTION, SOLUTION INTRAVENOUS at 01:04

## 2017-06-08 RX ADMIN — HYDROMORPHONE HYDROCHLORIDE 0.25 MG: 1 INJECTION, SOLUTION INTRAMUSCULAR; INTRAVENOUS; SUBCUTANEOUS at 01:34

## 2017-06-08 NOTE — DISCHARGE INSTRUCTIONS
Home and rest with cool packs, without ice directly upon the skin for the next 2 days, then heat    Take ibuprofen every 8 hours for 24 hours then as needed for pain.  Use this as your primary pain medication    Take Percocet as needed for pain with caution as it is a narcotic.  It can impair your balance and increase your risk of falls.  It will constipate you and start MiraLAX early if you become constipated he    Take Flexeril for muscle spasm.  Take this every 8 hours for the first 24 hours then every 8 hours as needed.  This is a sedating medicine and again will increase her risk for falls    Take the Medrol Dosepak.  Start tomorrow night, though do not take this if your blood sugar is greater than 240.  Maintain a strict diabetic diet while on this medicine as it may increase your blood sugar.  Check your blood sugar twice a day while on the Medrol.    Caution with ambulation while you're on this medication.  You may not drive while taking Percocet or Flexeril.    Follow-up with Dr. Wagnre in the office on Friday for mandatory reevaluation to assure the your symptoms are improving and your exam is not worrisome.  Follow up promptly if your symptoms are worsening and he did not require ER care.  Call her office tomorrow to arrange physical therapy for you    Follow-up with [facility].  This is for evaluation of your neck pain.  Call tomorrow and make an appointment    Immediate return to the ED if you have any worsened symptoms including intractable pain.  Any neurologic symptoms as discussed, or any significant progressive, urgent or emergent symptoms, or any symptoms that cannot be taken care of by your physician as discussed    Information regarding Risks and Benefits When using Opioids and Other Controlled Substances to include Storage and Disposal of Medications    When considering the use of opioids and other controlled substances for the control of pain, anxiety, or for other medical purposes,  you need to know of not only the benefits of these drugs but also of potential risks in using these drugs. These drugs, as well as more drugs, have beneficial uses; which is why their use is being considered in your   care, but they have risks involved in their use, too.    Opioids:  Opioids such as hydrocodone, oxycodone, hydromorphone, and codeine are pain relieving drugs, some more potent than others. They are most useful for moderate to more severe painful conditions. Risks include sedation, loss of coordination, decreased concentration, and decreased breathing with possibility of loss of consciousness or even death, especially if used in doses higher than prescribed. Improper usage can lead to addiction, tolerance, or overdose. In addition, many of these drugs are combined with acetaminophen (Tylenol) which can damage or destroy our liver when used excessively.  Alternatives to opioids are useful for mild to moderate pain and include ibuprofen (Motrin), naproxen (Aleve), aspirin, and acetaminophen (Tylenol). As with other drugs, these medications should be used according to directions on the label or from your doctor, as overuse can cause harm.    Benzodiazepines:  This group of drugs include: alprazolam (Xanax), diazepam (Valium), lorazepam (Ativan), and clonazepam (Klonopin). These drugs are used to control anxiety symptoms including anxiety and panic attacks. Risks using these drugs include: sedation, loss of coordination, decreased ability to concentrate, effects on memory, and decreased breathing with possibility of loss of consciousness or even death. Improper and prolonged usage can lead to addiction. An alternative without addiction potential is hydroxyzine (Vistrail).    Other Controlled Substance:  This group includes Soma, Tramadol, stimulant drugs such as Ritalin, and others. Stimulant drugs are not medications that are prescribed by ER doctors. Soma and Tramadol have sedative and addictive affects  similar to opioids with the same dangers mentioned with them.    Overdose:  If you or someone else are concerned that overdose has occurred, call 911 for transportation to the nearest hospital.    Storage and Disposal:  All medications need to be kept out of the reach of children or adults who cannot manage their own medicines. In addition, controlled substances can be targeted by criminals so extra precautions need to be taken to keep them in a safe, secure place. Any unused medications should be disposed of by flushing them down the toilet in the home setting or contact your local pharmacy.

## 2017-06-08 NOTE — ED PROVIDER NOTES
Subjective   HPI Comments: 81 y.o. male presents to the ED with c/o neck pain. He reports that 3 days ago he was doing a lot of activity and lifting heavy objects and today woke up with neck pain. He also complains of a headache. He denies nausea, vomiting and a fever. He has a hx of a prostatectomy and a porcine valve. No other acute complains at this time.    Patient is a 81 y.o. male presenting with neck pain.   History provided by:  Patient and relative  Neck Pain   Pain location:  Generalized neck  Pain severity:  Moderate  Onset quality:  Sudden  Timing:  Constant  Progression:  Unchanged  Chronicity:  New  Associated symptoms: headaches    Associated symptoms: no bladder incontinence, no bowel incontinence, no chest pain, no fever and no leg pain        Review of Systems   Constitutional: Negative.  Negative for fever.   Eyes: Negative.    Respiratory: Negative.    Cardiovascular: Negative for chest pain.   Gastrointestinal: Negative for abdominal pain, bowel incontinence, nausea and vomiting.   Endocrine: Negative.    Genitourinary: Negative.  Negative for bladder incontinence.   Musculoskeletal: Positive for neck pain.   Allergic/Immunologic: Negative.    Neurological: Positive for headaches.   All other systems reviewed and are negative.      Past Medical History:   Diagnosis Date   • Aortic stenosis     status post ROSS procedure, remote, with December 2015 echocardiography revealing normal aortic and pulmonary valve function, normal ejection fraction and mild to moderate MR   • Depression    • Diabetes mellitus    • Diverticulitis    • Exogenous obesity    • Gout    • Heart murmur    • History of vitamin D deficiency    • Hyperlipidemia    • Hypertension    • Malignant neoplasm of prostate    • Sleep apnea    • Vertigo        Allergies   Allergen Reactions   • Ampicillin    • Bee Venom    • Penicillins        Past Surgical History:   Procedure Laterality Date   • CARDIAC VALVE REPLACEMENT     • COLON  SURGERY     • COLONOSCOPY     • COLOSTOMY  1999   • COLOSTOMY REVISION     • EXPLORATORY LAPAROTOMY      With Tissue Removal   • OTHER SURGICAL HISTORY      Porcine Valve   • PROSTATE SURGERY     • PROSTATECTOMY  2000     History of Prostatectomy Perineal Radical       Family History   Problem Relation Age of Onset   • Diabetes Mother    • Lung cancer Mother    • Heart disease Father    • Breast cancer Other    • Cancer Other    • Hypertension Other    • Migraines Other    • Obesity Other    • Diabetes Other        Social History     Social History   • Marital status: Single     Spouse name: N/A   • Number of children: N/A   • Years of education: N/A     Social History Main Topics   • Smoking status: Never Smoker   • Smokeless tobacco: Never Used   • Alcohol use No   • Drug use: No   • Sexual activity: Defer     Other Topics Concern   • None     Social History Narrative         Objective   Physical Exam   Constitutional: He is oriented to person, place, and time. He appears well-developed and well-nourished. No distress.   HENT:   Head: Normocephalic and atraumatic.   Eyes: Conjunctivae are normal.   Neck:   Markedly decreased ROM of neck with extension, rotation and flexion.   Cardiovascular: Normal rate, regular rhythm and normal heart sounds.  Exam reveals no gallop and no friction rub.    No murmur heard.  Pulmonary/Chest: Effort normal and breath sounds normal. No respiratory distress. He has no decreased breath sounds. He has no wheezes. He has no rhonchi. He has no rales.   Lungs are clear to auscultation.    Abdominal: Soft. There is no tenderness. There is no rebound and no guarding.   Musculoskeletal: Normal range of motion. He exhibits no edema, tenderness or deformity.   Paracervical spasms. No discrete bony c spine or TTP.   Neurological: He is alert and oriented to person, place, and time. He has normal strength. He displays normal reflexes. No cranial nerve deficit or sensory deficit. He exhibits  normal muscle tone. Coordination normal.   Patient stiffens both legs with attempts at obtaining DTRs, which is a chronic problem by his report.  Prepatellar DTRs are equivocal.  She reports that this is chronic and unchanged for years    Cranial nerves II through XII are intact REM and finger to nose are intact.  Deltoid sensation is intact.  AB and adduction of the shoulders flexion and extension of the upper extremities collected and extension AB adduction and opposition of the fingers along with wrist flexion and extension are all intact.  Sensation is intact at all aspects of the upper and lower extremities.  There is no neurologic deficit on exam   Skin: Skin is warm and dry. He is not diaphoretic. No erythema.   No areas of erythema or warmth.   Psychiatric: He has a normal mood and affect. His behavior is normal.   Nursing note and vitals reviewed.      Procedures         ED Course  ED Course   Comment By Time   CASIE query complete. Treatment plan to include limited course of prescribed  controlled substance. Risks including addiction, benefits, and alternatives presented to patient. Rj Herrera MD 06/08 8052   Patient is serially reevaluated throughout the ED course with last reevaluation now.  He initially was hesitant to take any potent analgesic medication.  He however was in significant discomfort and was treated initially with Flexeril and small doses of hydromorphone 0.25 with minimal effect.  He was redosed at this amount and then treated with 0.5 mg.White blood count is unremarkable with a normal hematocrit and platelet count.  Differential reveals 72.2% neutrophils.Chem-12 reveals glucose of 2:15 with a total protein of 8.6 and albumin of 5.2An MRI is obtained of the cervical spine which shows no malignancy with moderate degenerative disease, foraminal encroachment though not severe with no acute cord lesion and again no metastatic lesions.I discussed findings at length with this very  pleasant and reliable patient and daughter.  I discussed treatment with steroids, though is sugars are the elevated.  He does not check his blood sugars other than about once a week by his report.  The glucose and I was postprandial.  I discussed the possibility of increase of blood glucose and ask him to stop his steroids if his sugar is above 240.  I discussed analgesic medications and will treat him with Percocet as I certainly believe this patient will take the most limited amount possible.  I'll treat him with ibuprofen as primary analgesic, and treated with Flexeril as he clearly has a muscle spasm in his right paracervical musculatureI discussed early reevaluation with Dr. Guillory would like to give him 24 hours to improve with follow-up on Friday in the office for mandatory reevaluation in view of the medications I'm prescribing, his age, and his symptoms.  He is going to go home with his daughter tonight to observe him through the initiation of treatment.  I discussed indications for immediate return at length with patient and daughter as well as neurosurgical follow-up as an outpatient. Rj Herrera MD 06/08 0334   I again stressed immediate return if any progressive symptoms or any neurologic symptoms at all and outlined these length.Very pleasant and reliable patient and daughter verbalize understanding agreement with the plan of care. Rj Herrera MD 06/08 0334   In further discussion the patient reports that shortly before onset of his symptoms he lifted a large bucket full of all of his mail subsequently lifted again and carried into his car, and subsequently lifted it again and carried it on his shoulder.  Apparently is quite heavy and he believes that this was the culprit activity causing his symptoms. Rj Herrera MD 06/08 0330                     [facility]    Final diagnoses:   Neck pain   Spondylosis of cervical region without myelopathy or radiculopathy   Neck strain, initial encounter        Documentation assistance provided by shahid Joshi.  Information recorded by the scribe was done at my direction and has been verified and validated by me.     Andra Joshi  06/07/17 3246       Rj Herrera MD  06/08/17 [id number]

## 2017-06-09 ENCOUNTER — OFFICE VISIT (OUTPATIENT)
Dept: INTERNAL MEDICINE | Facility: CLINIC | Age: 82
End: 2017-06-09

## 2017-06-09 VITALS
HEART RATE: 80 BPM | SYSTOLIC BLOOD PRESSURE: 140 MMHG | OXYGEN SATURATION: 98 % | WEIGHT: 209 LBS | BODY MASS INDEX: 33.23 KG/M2 | DIASTOLIC BLOOD PRESSURE: 70 MMHG

## 2017-06-09 DIAGNOSIS — IMO0001 UNCONTROLLED TYPE 2 DIABETES MELLITUS WITHOUT COMPLICATION, WITHOUT LONG-TERM CURRENT USE OF INSULIN: ICD-10-CM

## 2017-06-09 DIAGNOSIS — S16.1XXA NECK STRAIN, INITIAL ENCOUNTER: Primary | ICD-10-CM

## 2017-06-09 LAB — GLUCOSE BLDC GLUCOMTR-MCNC: 422 MG/DL (ref 70–130)

## 2017-06-09 PROCEDURE — 82947 ASSAY GLUCOSE BLOOD QUANT: CPT | Performed by: PHYSICIAN ASSISTANT

## 2017-06-09 PROCEDURE — 99213 OFFICE O/P EST LOW 20 MIN: CPT | Performed by: PHYSICIAN ASSISTANT

## 2017-06-09 NOTE — PROGRESS NOTES
Chief Complaint   Patient presents with   • ER follow up     Neck pain on 06/07/17 at [facility]   Blane Dietz is a 81 y.o. male.       History of Present Illness     Pt was in ER earlier this week with neck pain, had cervical MRI that showed some degenerative changes. He was started on medrol dose pack and prn oxycodone and flexeril. He has had improvement in movement and pain. Had to take only one dose of oxycodone and flexeril since ER visit.       Current Outpatient Prescriptions:   •  allopurinol (ZYLOPRIM) 300 MG tablet, 1 po qd, Disp: 30 tablet, Rfl: 3  •  ascorbic acid (VITAMIN C) 1000 MG tablet, Take 1,000 mg by mouth Daily., Disp: , Rfl:   •  aspirin 325 MG tablet, Take  by mouth 1 (one) time per week., Disp: , Rfl:   •  B Complex Vitamins (VITAMIN B COMPLEX PO), Take  by mouth Daily., Disp: , Rfl:   •  CRISTINA CONTOUR TEST test strip, , Disp: , Rfl:   •  Cholecalciferol (VITAMIN D3) 2000 UNITS capsule, Take  by mouth 2 (two) times a day., Disp: , Rfl:   •  Cinnamon 500 MG capsule, Take 2,000 mg by mouth Daily., Disp: , Rfl:   •  Coenzyme Q10 100 MG tablet, Take 2 tablets by mouth Daily., Disp: , Rfl:   •  cyclobenzaprine (FLEXERIL) 10 MG tablet, Take 1 tablet by mouth 3 (Three) Times a Day As Needed for Muscle Spasms., Disp: 20 tablet, Rfl: 0  •  fenofibric acid (TRILIPIX) 135 MG capsule delayed-release delayed release capsule, TAKE ONE CAPSULE BY MOUTH DAILY, Disp: 30 capsule, Rfl: 3  •  FLORASTOR 250 MG capsule, TAKE ONE CAPSULE BY MOUTH TWICE A DAY, Disp: 60 capsule, Rfl: 3  •  folic acid (FOLVITE) 400 MCG tablet, Take  by mouth 1 (one) time per week., Disp: , Rfl:   •  ibuprofen (ADVIL,MOTRIN) 600 MG tablet, Take 1 tablet by mouth Every 8 (Eight) Hours As Needed for Mild Pain (1-3)., Disp: 20 tablet, Rfl: 0  •  JANUVIA 100 MG tablet, TAKE ONE TABLET BY MOUTH DAILY, Disp: 30 tablet, Rfl: 3  •  JARDIANCE 10 MG tablet, , Disp: , Rfl:   •  l-methylfolate-algae-B6-B12 (METANX)  3-90.314-2-35 MG capsule capsule, Take  by mouth., Disp: , Rfl:   •  Lecithin 1200 MG capsule, Take  by mouth Daily., Disp: , Rfl:   •  metFORMIN (GLUCOPHAGE) 500 MG tablet, Take 2 tablets by mouth 2 (Two) Times a Day With Meals., Disp: 120 tablet, Rfl: 2  •  MethylPREDNISolone (MEDROL, RUDDY,) 4 MG tablet, Take as directed on package instructions., Disp: 21 tablet, Rfl: 0  •  metoprolol succinate XL (TOPROL-XL) 100 MG 24 hr tablet, TAKE ONE TABLET BY MOUTH DAILY, Disp: 30 tablet, Rfl: 3  •  mometasone (ELOCON) 0.1 % ointment, , Disp: , Rfl:   •  Multiple Vitamins-Minerals (ICAPS AREDS 2 PO), Take  by mouth Daily., Disp: , Rfl:   •  oxyCODONE-acetaminophen (PERCOCET) 5-325 MG per tablet, Take 1-2 tablets by mouth Every 6 (Six) Hours As Needed for Moderate Pain (4-6)., Disp: 24 tablet, Rfl: 0  •  pravastatin (PRAVACHOL) 40 MG tablet, TAKE ONE TABLET BY MOUTH EVERY NIGHT AT BEDTIME, Disp: 30 tablet, Rfl: 0  •  sildenafil (VIAGRA) 50 MG tablet, Take 1 tablet by mouth Daily As Needed for erectile dysfunction., Disp: 10 tablet, Rfl: 5  •  temazepam (RESTORIL) 15 MG capsule, Take 15 mg by mouth At Night As Needed for Sleep., Disp: , Rfl:   •  triamterene-hydrochlorothiazide (DYAZIDE) 37.5-25 MG per capsule, TAKE ONE CAPSULE BY MOUTH DAILY, Disp: 30 capsule, Rfl: 3  •  vitamin E 1000 UNIT capsule, Take 1,000 Units by mouth Daily., Disp: , Rfl:      PMFSH  The following portions of the patient's history were reviewed and updated as appropriate: allergies, current medications, past family history, past medical history, past social history, past surgical history and problem list.    Review of Systems   Constitutional: Negative for appetite change, fever and unexpected weight change.   HENT: Negative.    Eyes: Negative for pain and visual disturbance.   Respiratory: Negative for chest tightness, shortness of breath and wheezing.    Cardiovascular: Negative for chest pain and palpitations.   Gastrointestinal: Negative for  abdominal pain, blood in stool, diarrhea, nausea and vomiting.   Endocrine: Negative.    Genitourinary: Negative for difficulty urinating, flank pain, frequency and urgency.   Musculoskeletal: Positive for neck pain. Negative for joint swelling.   Skin: Negative for color change and rash.   Neurological: Negative for tremors and weakness.   Hematological: Negative for adenopathy.   Psychiatric/Behavioral: Negative for confusion and decreased concentration.   All other systems reviewed and are negative.      Objective   /70  Pulse 80  Wt 209 lb (94.8 kg)  SpO2 98%  BMI 33.23 kg/m2    Physical Exam   Constitutional: He is oriented to person, place, and time. He appears well-developed and well-nourished. No distress.   HENT:   Head: Normocephalic and atraumatic.   Eyes: Conjunctivae and EOM are normal. Pupils are equal, round, and reactive to light. No scleral icterus.   Neck: Normal range of motion. Neck supple. No tracheal deviation present. No thyromegaly present.   Cardiovascular: Normal rate, regular rhythm and normal heart sounds.  Exam reveals no gallop.    No murmur heard.  Pulmonary/Chest: Effort normal.   Abdominal: Soft.   Musculoskeletal: He exhibits tenderness. He exhibits no edema or deformity.        Cervical back: He exhibits decreased range of motion and tenderness. He exhibits no bony tenderness, no swelling, no edema, no pain and no spasm.   Lymphadenopathy:     He has no cervical adenopathy.   Neurological: He is alert and oriented to person, place, and time. He displays no atrophy, no tremor and normal reflexes. No cranial nerve deficit. He exhibits normal muscle tone. Coordination normal.   Skin: Skin is warm and dry. No rash noted. He is not diaphoretic.   Psychiatric: He has a normal mood and affect. His behavior is normal. Judgment and thought content normal.   Nursing note and vitals reviewed.      Results for orders placed or performed in visit on 06/09/17   POCT Glucose   Result  Value Ref Range    Glucose 422 (A) 70 - 130 mg/dL        ASSESSMENT/PLAN    Problem List Items Addressed This Visit        Endocrine    Uncontrolled type 2 diabetes mellitus    Relevant Orders    POCT Glucose (Completed)      Other Visit Diagnoses     Neck strain, initial encounter    -  Primary    Complete medrol dose pack as directed. Use oxycodone and flexeril prn. If incomplete improvement, pt will schedule PT.    Relevant Orders    Ambulatory Referral to Physical Therapy Evaluate and treat               Return if symptoms worsen or fail to improve, for Next scheduled follow up.

## 2017-06-26 ENCOUNTER — TELEPHONE (OUTPATIENT)
Dept: INTERNAL MEDICINE | Facility: CLINIC | Age: 82
End: 2017-06-26

## 2017-07-11 RX ORDER — PRAVASTATIN SODIUM 40 MG
TABLET ORAL
Qty: 30 TABLET | Refills: 0 | Status: SHIPPED | OUTPATIENT
Start: 2017-07-11 | End: 2017-08-07 | Stop reason: SDUPTHER

## 2017-07-21 ENCOUNTER — OFFICE VISIT (OUTPATIENT)
Dept: NEUROSURGERY | Facility: CLINIC | Age: 82
End: 2017-07-21

## 2017-07-21 VITALS — WEIGHT: 201 LBS | BODY MASS INDEX: 31.55 KG/M2 | HEIGHT: 67 IN | TEMPERATURE: 97.6 F

## 2017-07-21 DIAGNOSIS — S16.1XXA CERVICAL STRAIN, INITIAL ENCOUNTER: Primary | ICD-10-CM

## 2017-07-21 DIAGNOSIS — M50.30 DEGENERATIVE DISC DISEASE, CERVICAL: ICD-10-CM

## 2017-07-21 DIAGNOSIS — M43.12 SPONDYLOLISTHESIS OF CERVICAL REGION: ICD-10-CM

## 2017-07-21 DIAGNOSIS — M47.812 CERVICAL ARTHRITIS: ICD-10-CM

## 2017-07-21 PROCEDURE — 99203 OFFICE O/P NEW LOW 30 MIN: CPT | Performed by: NEUROLOGICAL SURGERY

## 2017-07-21 RX ORDER — HYDROCHLOROTHIAZIDE 25 MG/1
25 TABLET ORAL DAILY
COMMUNITY
End: 2019-07-11

## 2017-07-21 NOTE — PROGRESS NOTES
Patient: Blane Dietz  : 1935    Primary Care Provider: Maria Fernanda Wagner DO    Requesting Provider: Dr. Rj Herrera        History    Chief Complaint: Neck pain.    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  The symptoms began after he was moving a grill, carrying water, and handling charcoal for a picnic type of event.  He has had no arm symptoms including pain, weakness, or sensory alteration.  He has attended formal physical therapy and is also doing some home stretches and exercises.  His symptoms are markedly diminished.  He denies bowel or bladder difficulty.  He's not had any prior neck difficulties.  His symptoms are across the entire dorsal aspect of his neck and are not unilateral.    Review of Systems   Constitutional: Negative for activity change, appetite change, chills, diaphoresis, fatigue, fever and unexpected weight change.   HENT: Negative for congestion, dental problem, drooling, ear discharge, ear pain, facial swelling, hearing loss, mouth sores, nosebleeds, postnasal drip, rhinorrhea, sinus pressure, sneezing, sore throat, tinnitus, trouble swallowing and voice change.    Eyes: Negative for photophobia, pain, discharge, redness, itching and visual disturbance.   Respiratory: Positive for apnea. Negative for cough, choking, chest tightness, shortness of breath, wheezing and stridor.    Cardiovascular: Negative for chest pain, palpitations and leg swelling.   Gastrointestinal: Negative for abdominal distention, abdominal pain, anal bleeding, blood in stool, constipation, diarrhea, nausea, rectal pain and vomiting.   Endocrine: Negative for cold intolerance, heat intolerance, polydipsia, polyphagia and polyuria.   Genitourinary: Negative for decreased urine volume, difficulty urinating, dysuria, enuresis, flank pain, frequency, genital sores, hematuria and urgency.   Musculoskeletal: Positive for  "arthralgias, back pain, neck pain and neck stiffness. Negative for gait problem, joint swelling and myalgias.   Skin: Negative for color change, pallor, rash and wound.   Allergic/Immunologic: Negative for environmental allergies, food allergies and immunocompromised state.   Neurological: Negative for dizziness, tremors, seizures, syncope, facial asymmetry, speech difficulty, weakness, light-headedness, numbness and headaches.   Hematological: Negative for adenopathy. Does not bruise/bleed easily.   Psychiatric/Behavioral: Positive for dysphoric mood. Negative for agitation, behavioral problems, confusion, decreased concentration, hallucinations, self-injury, sleep disturbance and suicidal ideas. The patient is not nervous/anxious and is not hyperactive.        The patient's past medical history, past surgical history, family history, and social history have been reviewed at length in the electronic medical record.    Physical Exam:   Temp 97.6 °F (36.4 °C)  Ht 66.5\" (168.9 cm)  Wt 201 lb (91.2 kg)  BMI 31.96 kg/m2  CONSTITUTIONAL: Patient is well-nourished, pleasant and appears stated age.  CV: Heart regular rate and rhythm without murmur, rub, or gallop.  PULMONARY: Lungs are clear to ascultation.  MUSCULOSKELETAL:  Neck tenderness to palpation is not observed.   ROM in neck is very mildly impaired in all directions.  NEUROLOGICAL:  Orientation, memory, attention span, language function, and cognition have been examined and are intact.  Strength is intact in the upper and lower extremities to direct testing.  Muscle tone is normal throughout.  Station and gait are normal.  Sensation is intact to light touch testing throughout.  Deep tendon reflexes are 1+ and symmetrical.  Mague's Sign is negative bilaterally. No clonus is elicited.  Coordination is intact.      Medical Decision Making    Data Review:   MRI of the cervical spine dated 6/8/17 demonstrates diffuse degenerative disc disease and spondylosis.  " There is a very low-grade offset of C4 on C5.  There is no significant nerve root or cord compromise.    Diagnosis:   1.  Cervical strain, picnic related.  2.  Cervical spondylosis.    Treatment Options:   Mr. Dietz is improving on his own.  I renewed his therapy.  Thereafter he'll continue his stretches and exercise regimen on a home basis.  He will follow-up with me as needed.       Diagnosis Plan   1. Cervical strain, initial encounter  Ambulatory Referral to Physical Therapy Evaluate and treat   2. Spondylolisthesis of cervical region     3. Degenerative disc disease, cervical     4. Cervical arthritis         Scribed for Billy Connolly MD by Basia Muñoz CMA on 07/21/2017 at 3:43 PM    I, Dr. Connolly, personally performed the services described in the documentation, as scribed in my presence, and it is both accurate and complete.

## 2017-08-07 RX ORDER — PRAVASTATIN SODIUM 40 MG
TABLET ORAL
Qty: 30 TABLET | Refills: 2 | Status: SHIPPED | OUTPATIENT
Start: 2017-08-07 | End: 2017-11-05 | Stop reason: SDUPTHER

## 2017-08-14 RX ORDER — FENOFIBRIC ACID 135 MG/1
CAPSULE, DELAYED RELEASE ORAL
Qty: 30 CAPSULE | Refills: 2 | Status: SHIPPED | OUTPATIENT
Start: 2017-08-14 | End: 2017-11-12 | Stop reason: SDUPTHER

## 2017-08-30 ENCOUNTER — OFFICE VISIT (OUTPATIENT)
Dept: ONCOLOGY | Facility: CLINIC | Age: 82
End: 2017-08-30

## 2017-08-30 VITALS
RESPIRATION RATE: 18 BRPM | HEART RATE: 42 BPM | DIASTOLIC BLOOD PRESSURE: 74 MMHG | SYSTOLIC BLOOD PRESSURE: 159 MMHG | TEMPERATURE: 97.8 F | BODY MASS INDEX: 32.43 KG/M2 | WEIGHT: 204 LBS

## 2017-08-30 DIAGNOSIS — C61 PROSTATE CANCER (HCC): Primary | ICD-10-CM

## 2017-08-30 PROCEDURE — 99215 OFFICE O/P EST HI 40 MIN: CPT | Performed by: INTERNAL MEDICINE

## 2017-08-30 RX ORDER — BICALUTAMIDE 50 MG/1
50 TABLET, FILM COATED ORAL DAILY
Qty: 10 TABLET | Refills: 0 | Status: SHIPPED | OUTPATIENT
Start: 2017-08-30 | End: 2018-03-30

## 2017-08-30 NOTE — PROGRESS NOTES
DATE OF VISIT: 8/30/2017     REASON FOR VISIT:   1. Prostate cancer. Presented with stage I, [id number], PSA at diagnosis 3 status  post prostatectomy in 2000.  2. Relapsed disease with rise in PSA gradually over the last couple of years,  most recent PSA 7.1 ng/mL on 01/25/2016.      HISTORY OF PRESENT ILLNESS: The patient is a very pleasant 80-year-old  gentleman with past medical history significant for prostate cancer status post  radical prostatectomy in 2000. The patient never received adjuvant treatments,  neither radiation, GnRH or antiandrogen treatment. The patient's PSA started  to go up gradually over the last 2 years. Patient was referred to me on  06/13/2014 and I did restaging workup that came back negative including bone  scan, CAT scans chest, abdomen and pelvis. We elected to proceed with watchful  waiting. Patient is here today in scheduled followup visit.     SUBJECTIVE: The patient has been doing fairly well. He denied any fever or  chills. He denied any night sweats. He denied any diarrhea. No headaches. No  abdominal pain. No problem urinating.      REVIEW OF SYSTEMS: All the other 9 systems are reviewed by me and negative  except what is mentioned in HPI.      PAST MEDICAL HISTORY/SOCIAL HISTORY/FAMILY HISTORY: Unchanged from my prior  documentation on 06/13/2014.       Current Outpatient Prescriptions:   •  allopurinol (ZYLOPRIM) 300 MG tablet, 1 po qd (Patient taking differently: Take 300 mg by mouth Daily.), Disp: 30 tablet, Rfl: 3  •  ascorbic acid (VITAMIN C) 1000 MG tablet, Take 1,000 mg by mouth Daily., Disp: , Rfl:   •  aspirin 325 MG tablet, Take 81 mg by mouth 1 (One) Time Per Week., Disp: , Rfl:   •  B Complex Vitamins (VITAMIN B COMPLEX PO), Take  by mouth Daily., Disp: , Rfl:   •  CRISTINA CONTOUR TEST test strip, , Disp: , Rfl:   •  bicalutamide (CASODEX) 50 MG chemo tablet, Take 1 tablet by mouth Daily. For 10 days, Disp: 10 tablet, Rfl: 0  •  Cholecalciferol (VITAMIN D3) 2000 UNITS  capsule, Take  by mouth 2 (two) times a day., Disp: , Rfl:   •  Cinnamon 500 MG capsule, Take 2,000 mg by mouth Daily., Disp: , Rfl:   •  Coenzyme Q10 100 MG tablet, Take 2 tablets by mouth Daily., Disp: , Rfl:   •  fenofibric acid (TRILIPIX) 135 MG capsule delayed-release delayed release capsule, TAKE ONE CAPSULE BY MOUTH DAILY, Disp: 30 capsule, Rfl: 2  •  FLORASTOR 250 MG capsule, TAKE ONE CAPSULE BY MOUTH TWICE A DAY, Disp: 60 capsule, Rfl: 3  •  folic acid (FOLVITE) 400 MCG tablet, Take  by mouth 1 (one) time per week., Disp: , Rfl:   •  hydrochlorothiazide (HYDRODIURIL) 25 MG tablet, Take 25 mg by mouth Daily., Disp: , Rfl:   •  JANUVIA 100 MG tablet, TAKE ONE TABLET BY MOUTH DAILY, Disp: 30 tablet, Rfl: 3  •  JARDIANCE 10 MG tablet, , Disp: , Rfl:   •  l-methylfolate-algae-B6-B12 (METANX) 3-90.314-2-35 MG capsule capsule, Take  by mouth., Disp: , Rfl:   •  Lecithin 1200 MG capsule, Take  by mouth Daily., Disp: , Rfl:   •  metFORMIN (GLUCOPHAGE) 500 MG tablet, Take 2 tablets by mouth 2 (Two) Times a Day With Meals., Disp: 120 tablet, Rfl: 2  •  metoprolol succinate XL (TOPROL-XL) 100 MG 24 hr tablet, TAKE ONE TABLET BY MOUTH DAILY, Disp: 30 tablet, Rfl: 3  •  Multiple Vitamins-Minerals (ICAPS AREDS 2 PO), Take  by mouth Daily., Disp: , Rfl:   •  pravastatin (PRAVACHOL) 40 MG tablet, TAKE ONE TABLET BY MOUTH EVERY NIGHT AT BEDTIME, Disp: 30 tablet, Rfl: 2  •  sildenafil (VIAGRA) 50 MG tablet, Take 1 tablet by mouth Daily As Needed for erectile dysfunction., Disp: 10 tablet, Rfl: 5  •  triamterene-hydrochlorothiazide (DYAZIDE) 37.5-25 MG per capsule, TAKE ONE CAPSULE BY MOUTH DAILY, Disp: 30 capsule, Rfl: 3  •  vitamin E 1000 UNIT capsule, Take 1,000 Units by mouth Daily., Disp: , Rfl:     PHYSICAL EXAMINATION:   /74  Pulse (!) 42  Temp 97.8 °F (36.6 °C) (Temporal Artery )   Resp 18  Wt 204 lb (92.5 kg)  BMI 32.43 kg/m2  ECOG1  GENERAL: Age appropriate. No acute distress.   HEENT: Head atraumatic,  normocephalic.   NECK: Supple. No JVD. No lymphadenopathy.   LUNGS: Clear to auscultation bilaterally. No wheezing. No rhonchi.   HEART: Regular rate and rhythm. S1, S2, no murmurs.   ABDOMEN: Soft, nontender, nondistended. Bowel sounds positive. No  hepatosplenomegaly.   EXTREMITIES: No clubbing, cyanosis, or edema.   SKIN: No rashes. No purpura.   NEUROLOGIC: Awake and oriented x3. Strength 5 out of 5 in all muscle groups.     No visits with results within 2 Week(s) from this visit.  Latest known visit with results is:    Office Visit on 06/09/2017   Component Date Value Ref Range Status   • Glucose 06/09/2017 422* 70 - 130 mg/dL Final        ASSESSMENT: The patient is a very pleasant 80-year-old gentleman with relapsed  prostate cancer.     PROBLEM LIST:  1. Prostate cancer, initially presented as [id number] status post radical  prostatectomy 2000.  2. Rise in PSA gradually over the last 2 years, most recently 5.4 on  05/20/2014.  3. Multiple comorbidities including type 2 diabetes, hypertension, morbid  obesity, osteoarthritis, and hypercholesterolemia.  4. Erectile dysfunction.   5.  Family history of colon cancer     PLAN:  1. I did go over the results in detail with the patient. I explained to him that  his his PSA is a higher at 11. At this point I will go ahead and get patient started on treatment since his PSA is steadily increasing, its more than 10 at this point, and he has new symptoms with back and abdominal pain. I will do Eligard monthly first then q3 month if he is able to tolerate treatment. I will add Casodex for 10 days only when I am starting treatment just to prevent flair from [facility]. I will set him up to have a bone scan and CT abdomen and pelvis before starting.  2. The patient will come back to see me in 1 months with repeat blood work as  well as serum PSA.   3. I will continue Viagra as needed for erectile dysfunction.   4.  We'll continue Januvia as well as metformin for type 2 diabetes  5.   We'll continue pravastatin for hypercholesterolemia  6.  Patient need to have 5 year follow-up study which would be due 11/2021.  7. We reviewed the potential side effects of the treatment including hot flashes, impotence, joint pain, decrease in bone density, mood or sleep disturbance, and asthenia.    Shannan Ramon MD  8/30/2017

## 2017-09-06 ENCOUNTER — INFUSION (OUTPATIENT)
Dept: ONCOLOGY | Facility: HOSPITAL | Age: 82
End: 2017-09-06

## 2017-09-06 VITALS
BODY MASS INDEX: 32.59 KG/M2 | HEART RATE: 78 BPM | WEIGHT: 205 LBS | DIASTOLIC BLOOD PRESSURE: 57 MMHG | SYSTOLIC BLOOD PRESSURE: 120 MMHG | RESPIRATION RATE: 18 BRPM | TEMPERATURE: 97.4 F

## 2017-09-06 DIAGNOSIS — C61 PROSTATE CANCER (HCC): Primary | ICD-10-CM

## 2017-09-06 PROCEDURE — 96402 CHEMO HORMON ANTINEOPL SQ/IM: CPT

## 2017-09-06 PROCEDURE — 25010000002 LEUPROLIDE 22.5 MG KIT: Performed by: INTERNAL MEDICINE

## 2017-09-06 RX ADMIN — LEUPROLIDE ACETATE 22.5 MG: KIT SUBCUTANEOUS at 14:17

## 2017-09-07 ENCOUNTER — HOSPITAL ENCOUNTER (OUTPATIENT)
Dept: CT IMAGING | Facility: HOSPITAL | Age: 82
End: 2017-09-07

## 2017-09-07 ENCOUNTER — APPOINTMENT (OUTPATIENT)
Dept: NUCLEAR MEDICINE | Facility: HOSPITAL | Age: 82
End: 2017-09-07

## 2017-09-15 ENCOUNTER — HOSPITAL ENCOUNTER (OUTPATIENT)
Dept: NUCLEAR MEDICINE | Facility: HOSPITAL | Age: 82
Discharge: HOME | End: 2017-09-15
Attending: INTERNAL MEDICINE

## 2017-09-15 ENCOUNTER — HOSPITAL ENCOUNTER (OUTPATIENT)
Dept: CT IMAGING | Facility: HOSPITAL | Age: 82
Discharge: HOME | End: 2017-09-15
Attending: INTERNAL MEDICINE | Admitting: INTERNAL MEDICINE

## 2017-09-15 DIAGNOSIS — C61 PROSTATE CANCER (HCC): ICD-10-CM

## 2017-09-15 PROCEDURE — 82565 ASSAY OF CREATININE: CPT

## 2017-09-15 PROCEDURE — 74177 CT ABD & PELVIS W/CONTRAST: CPT

## 2017-09-15 PROCEDURE — 0 IOPAMIDOL 61 % SOLUTION: Performed by: INTERNAL MEDICINE

## 2017-09-15 RX ORDER — TC 99M MEDRONATE 20 MG/10ML
27 INJECTION, POWDER, LYOPHILIZED, FOR SOLUTION INTRAVENOUS
Status: COMPLETED | OUTPATIENT
Start: 2017-09-15 | End: 2017-09-15

## 2017-09-15 RX ADMIN — Medication 27 MILLICURIE: at 08:35

## 2017-09-15 RX ADMIN — IOPAMIDOL 95 ML: 612 INJECTION, SOLUTION INTRAVENOUS at 08:55

## 2017-09-18 ENCOUNTER — HOSPITAL ENCOUNTER (EMERGENCY)
Facility: HOSPITAL | Age: 82
Discharge: HOME | End: 2017-09-18
Attending: EMERGENCY MEDICINE | Admitting: EMERGENCY MEDICINE

## 2017-09-18 VITALS
TEMPERATURE: 98 F | BODY MASS INDEX: 31.39 KG/M2 | SYSTOLIC BLOOD PRESSURE: 178 MMHG | DIASTOLIC BLOOD PRESSURE: 82 MMHG | WEIGHT: 200 LBS | HEIGHT: 67 IN | OXYGEN SATURATION: 95 % | HEART RATE: 76 BPM | RESPIRATION RATE: 18 BRPM

## 2017-09-18 DIAGNOSIS — I10 ELEVATED BLOOD PRESSURE READING WITH DIAGNOSIS OF HYPERTENSION: ICD-10-CM

## 2017-09-18 DIAGNOSIS — R04.0 ACUTE ANTERIOR EPISTAXIS: Primary | ICD-10-CM

## 2017-09-18 LAB — CREAT BLDA-MCNC: 1.1 MG/DL (ref 0.6–1.3)

## 2017-09-18 PROCEDURE — 99283 EMERGENCY DEPT VISIT LOW MDM: CPT

## 2017-09-18 RX ORDER — METOPROLOL SUCCINATE 100 MG/1
TABLET, EXTENDED RELEASE ORAL
Qty: 30 TABLET | Refills: 0 | Status: SHIPPED | OUTPATIENT
Start: 2017-09-18 | End: 2017-10-22 | Stop reason: SDUPTHER

## 2017-09-19 NOTE — DISCHARGE INSTRUCTIONS
Use your Afrin twice a day and then every 12 hours as needed for bleeding.  Return if you have further bleeding that she can't get stopped with Afrin and 10 minutes of pressure

## 2017-09-19 NOTE — ED PROVIDER NOTES
Subjective   HPI Comments: Mr. Blane Dietz is an 82 y.o. male who presents to the ED with c/o a nose bleed. He reports that around 2200 tonight, while he was cleaning his sink, he began bleeding out of his left nare. The bleeding has since stopped but he due to his age he felt it was better to come into the ED and have it looked at. He states that he used Vicks decongestant spray yesterday and that he sleeps with a CPAP at night but he uses a humidifier with it. He notes that he has recently also had a stuffy nose and has been blowing his nose a lot. He reports that he is not on any blood thinners other than aspirin, which he forgot to take this morning. He has a hx of HTN and a porcine heart valve. No other acute complaints at this time.    Patient is a 82 y.o. male presenting with nosebleeds.   History provided by:  Patient  Nose Bleed   Location:  L nare  Severity:  Mild  Timing:  Constant  Progression:  Resolved  Chronicity:  New  Associated symptoms: congestion        Review of Systems   HENT: Positive for congestion and nosebleeds.    All other systems reviewed and are negative.      Past Medical History:   Diagnosis Date   • Aortic stenosis     status post ROSS procedure, remote, with December 2015 echocardiography revealing normal aortic and pulmonary valve function, normal ejection fraction and mild to moderate MR   • Arthritis    • Depression    • Diabetes mellitus    • Diverticulitis    • Exogenous obesity    • Gout    • Heart murmur    • History of vitamin D deficiency    • Hyperlipidemia    • Hypertension    • Malignant neoplasm of prostate    • Sleep apnea    • Vertigo        Allergies   Allergen Reactions   • Ampicillin    • Bee Venom    • Medrol [Methylprednisolone]      Made his blood sugar get really high, can't take this   • Penicillins        Past Surgical History:   Procedure Laterality Date   • CARDIAC VALVE REPLACEMENT     • CATARACT EXTRACTION Bilateral    • COLON SURGERY     • COLONOSCOPY      • COLOSTOMY  1999   • COLOSTOMY REVISION     • EXPLORATORY LAPAROTOMY      With Tissue Removal   • LIPOMA EXCISION     • OTHER SURGICAL HISTORY      Porcine Valve   • PROSTATE SURGERY     • PROSTATECTOMY  2000     History of Prostatectomy Perineal Radical   • SKIN CANCER EXCISION      from head and lip   • TONSILLECTOMY         Family History   Problem Relation Age of Onset   • Diabetes Mother    • Lung cancer Mother    • Cancer Mother    • Heart disease Father    • Breast cancer Other    • Cancer Other    • Hypertension Other    • Migraines Other    • Obesity Other    • Diabetes Other        Social History     Social History   • Marital status: Single     Spouse name: N/A   • Number of children: N/A   • Years of education: N/A     Social History Main Topics   • Smoking status: Never Smoker   • Smokeless tobacco: Never Used   • Alcohol use No   • Drug use: No   • Sexual activity: Defer     Other Topics Concern   • None     Social History Narrative         Objective   Physical Exam   Constitutional: He is oriented to person, place, and time. He appears well-developed and well-nourished. No distress.   HENT:   Head: Normocephalic and atraumatic.   Nose: Nose normal. No epistaxis.   Patient has a small dilated blood vessel in his left anterior septum that appeared to be where the bleeding had been coming from.   Eyes: Conjunctivae are normal. No scleral icterus.   Neck: Normal range of motion. Neck supple.   Cardiovascular: Normal rate, regular rhythm and normal heart sounds.    No murmur heard.  Pulmonary/Chest: Effort normal and breath sounds normal. No respiratory distress.   Musculoskeletal: Normal range of motion.   Neurological: He is alert and oriented to person, place, and time.   Skin: Skin is warm and dry. He is not diaphoretic.   Psychiatric: He has a normal mood and affect. His behavior is normal.   Nursing note and vitals reviewed.      Epistaxis Management  Date/Time: 9/18/2017 11:46 PM  Performed by:  MATTHEW DOUGLASS  Authorized by: MATTHEW DOUGLASS   Treatment site: left anterior  Repair method: silver nitrate  Post-procedure assessment: Did not see any bleeding after cautery.  I confirmed that there was eschar directly over the dilated blood vessel that appeared to be the source of his bleeding.  Treatment complexity: simple               ED Course  ED Course                     MDM    Final diagnoses:   Acute anterior epistaxis   Elevated blood pressure reading with diagnosis of hypertension       Documentation assistance provided by shahid Joshi.  Information recorded by the scribe was done at my direction and has been verified and validated by me.     Andra Joshi  09/18/17 [id number]       Matthew Douglass MD  09/18/17 [id number]

## 2017-09-21 ENCOUNTER — HOSPITAL ENCOUNTER (EMERGENCY)
Facility: HOSPITAL | Age: 82
Discharge: HOME | End: 2017-09-21
Attending: EMERGENCY MEDICINE | Admitting: EMERGENCY MEDICINE

## 2017-09-21 VITALS
SYSTOLIC BLOOD PRESSURE: 157 MMHG | HEIGHT: 67 IN | TEMPERATURE: 98 F | DIASTOLIC BLOOD PRESSURE: 65 MMHG | BODY MASS INDEX: 31.39 KG/M2 | WEIGHT: 200 LBS | HEART RATE: 74 BPM | RESPIRATION RATE: 18 BRPM | OXYGEN SATURATION: 95 %

## 2017-09-21 DIAGNOSIS — R04.0 BLEEDING NOSE: Primary | ICD-10-CM

## 2017-09-21 PROCEDURE — 99282 EMERGENCY DEPT VISIT SF MDM: CPT

## 2017-09-21 NOTE — ED PROVIDER NOTES
Subjective   History of Present Illness  This is an 82-year-old male who comes in today complaining of a nosebleed.  He states on Monday the 18th he went to [facility] with a similar nosebleed and they did use cauterization with silver nitrate to stop the bleeding.  He states he is instructed to use Afrin twice a day and if he did have any nosebleeds to hold pressure for 10 minutes.  However this morning he went to blow his nose he did have a scant amount of bleeding on his tissue paper and felt he needed to come in to have this evaluated.  He did not get the Afrin and he has not used any pressure prior to arrival.  He does have an appointment with Dr. ANGELIA MONTERO tomorrow.   Review of Systems   Constitutional: Negative.    HENT: Positive for nosebleeds.    Eyes: Negative.    Respiratory: Negative.    Cardiovascular: Negative.    Gastrointestinal: Negative.    Genitourinary: Negative.    Skin: Negative.    Neurological: Negative.    Psychiatric/Behavioral: Negative.    All other systems reviewed and are negative.      Past Medical History:   Diagnosis Date   • Aortic stenosis     status post ROSS procedure, remote, with December 2015 echocardiography revealing normal aortic and pulmonary valve function, normal ejection fraction and mild to moderate MR   • Arthritis    • Depression    • Diabetes mellitus    • Diverticulitis    • Exogenous obesity    • Gout    • Heart murmur    • History of vitamin D deficiency    • Hyperlipidemia    • Hypertension    • Malignant neoplasm of prostate    • Sleep apnea    • Vertigo        Allergies   Allergen Reactions   • Ampicillin    • Bee Venom    • Medrol [Methylprednisolone]      Made his blood sugar get really high, can't take this   • Penicillins        Past Surgical History:   Procedure Laterality Date   • CARDIAC VALVE REPLACEMENT     • CATARACT EXTRACTION Bilateral    • COLON SURGERY     • COLONOSCOPY     • COLOSTOMY  1999   • COLOSTOMY REVISION     • EXPLORATORY  LAPAROTOMY      With Tissue Removal   • LIPOMA EXCISION     • OTHER SURGICAL HISTORY      Porcine Valve   • PROSTATE SURGERY     • PROSTATECTOMY  2000     History of Prostatectomy Perineal Radical   • SKIN CANCER EXCISION      from head and lip   • TONSILLECTOMY         Family History   Problem Relation Age of Onset   • Diabetes Mother    • Lung cancer Mother    • Cancer Mother    • Heart disease Father    • Breast cancer Other    • Cancer Other    • Hypertension Other    • Migraines Other    • Obesity Other    • Diabetes Other        Social History     Social History   • Marital status: Single     Spouse name: N/A   • Number of children: N/A   • Years of education: N/A     Social History Main Topics   • Smoking status: Never Smoker   • Smokeless tobacco: Never Used   • Alcohol use No   • Drug use: No   • Sexual activity: Defer     Other Topics Concern   • None     Social History Narrative           Objective   Physical Exam   Constitutional: He appears well-developed and well-nourished.   Nursing note and vitals reviewed.  GEN: No acute distress  Head: Normocephalic, atraumatic  Eyes: Pupils equal round reactive to light  ENT: Posterior pharynx normal in appearance, oral mucosa is moist, nares with mild edema. No bleeding noted.   Chest: Nontender to palpation  Cardiovascular: Regular rate  Lungs: Clear to auscultation bilaterally  Abdomen: Soft, nontender, nondistended, no peritoneal signs  Extremities: No edema, normal appearance  Neuro: GCS 15  Psych: Mood and affect are appropriate      Procedures         ED Course  ED Course                  MDM  Number of Diagnoses or Management Options  Diagnosis management comments: Upon evaluation noted mild edema to his nares however I do not see any active bleeding at this time.  After looking at his clinic's there is only a scant amount of blood-tinged nasal mucus noted.  After reviewing his previous medical records he was advised to start Afrin twice a day he states he  did not do this.  We will go ahead and advise him to start the Afrin twice a day if he has any prior bleeding to hold pressure for 10 minutes and also he does have an appointment with ENT tomorrow for him to keep that appointment.    Risk of Complications, Morbidity, and/or Mortality  Presenting problems: minimal  Diagnostic procedures: minimal  Management options: minimal        Final diagnoses:   Bleeding nose            Benita Bruner, APRN  09/21/17 [id number]

## 2017-09-25 RX ORDER — ALLOPURINOL 300 MG/1
TABLET ORAL
Qty: 30 TABLET | Refills: 2 | Status: SHIPPED | OUTPATIENT
Start: 2017-09-25 | End: 2017-12-26 | Stop reason: SDUPTHER

## 2017-09-25 RX ORDER — TRIAMTERENE AND HYDROCHLOROTHIAZIDE 37.5; 25 MG/1; MG/1
CAPSULE ORAL
Qty: 30 CAPSULE | Refills: 2 | Status: SHIPPED | OUTPATIENT
Start: 2017-09-25 | End: 2017-12-26 | Stop reason: SDUPTHER

## 2017-09-25 RX ORDER — SITAGLIPTIN 100 MG/1
TABLET, FILM COATED ORAL
Qty: 30 TABLET | Refills: 2 | Status: SHIPPED | OUTPATIENT
Start: 2017-09-25 | End: 2017-12-26 | Stop reason: SDUPTHER

## 2017-09-27 ENCOUNTER — OFFICE VISIT (OUTPATIENT)
Dept: ONCOLOGY | Facility: CLINIC | Age: 82
End: 2017-09-27

## 2017-09-27 VITALS
HEART RATE: 74 BPM | DIASTOLIC BLOOD PRESSURE: 73 MMHG | TEMPERATURE: 97 F | WEIGHT: 201 LBS | SYSTOLIC BLOOD PRESSURE: 131 MMHG | BODY MASS INDEX: 31.48 KG/M2 | RESPIRATION RATE: 15 BRPM

## 2017-09-27 DIAGNOSIS — C61 PROSTATE CANCER (HCC): Primary | ICD-10-CM

## 2017-09-27 PROCEDURE — 99214 OFFICE O/P EST MOD 30 MIN: CPT | Performed by: INTERNAL MEDICINE

## 2017-09-27 RX ORDER — ASPIRIN 81 MG/1
81 TABLET ORAL DAILY
COMMUNITY
End: 2021-09-29

## 2017-09-27 NOTE — PROGRESS NOTES
DATE OF VISIT: 9/27/2017     REASON FOR VISIT:   1. Prostate cancer. Presented with stage I, [id number], PSA at diagnosis 3 status  post prostatectomy in 2000.  2. Relapsed disease with rise in PSA gradually over the last couple of years,  most recent PSA 7.1 ng/mL on 01/25/2016.      HISTORY OF PRESENT ILLNESS: The patient is a very pleasant 80-year-old  gentleman with past medical history significant for prostate cancer status post  radical prostatectomy in 2000. The patient never received adjuvant treatments,  neither radiation, GnRH or antiandrogen treatment. The patient's PSA started  to go up gradually over the last 2 years. Patient was referred to me on  06/13/2014 and I did restaging workup that came back negative including bone  scan, CAT scans chest, abdomen and pelvis. We elected to proceed with watchful  waiting. Patient is here today in scheduled followup visit.     SUBJECTIVE: The patient has been doing fairly well. He denied any fever or  chills. He denied any night sweats. He denied any diarrhea. No headaches. No  abdominal pain. No problem urinating.      REVIEW OF SYSTEMS: All the other 9 systems are reviewed by me and negative  except what is mentioned in HPI.      PAST MEDICAL HISTORY/SOCIAL HISTORY/FAMILY HISTORY: Unchanged from my prior  documentation on 06/13/2014.       Current Outpatient Prescriptions:   •  aspirin 81 MG EC tablet, Take 81 mg by mouth Daily., Disp: , Rfl:   •  allopurinol (ZYLOPRIM) 300 MG tablet, TAKE ONE TABLET BY MOUTH DAILY, Disp: 30 tablet, Rfl: 2  •  ascorbic acid (VITAMIN C) 1000 MG tablet, Take 1,000 mg by mouth Daily., Disp: , Rfl:   •  B Complex Vitamins (VITAMIN B COMPLEX PO), Take  by mouth Daily., Disp: , Rfl:   •  CRISTINA CONTOUR TEST test strip, , Disp: , Rfl:   •  bicalutamide (CASODEX) 50 MG chemo tablet, Take 1 tablet by mouth Daily. For 10 days, Disp: 10 tablet, Rfl: 0  •  Cholecalciferol (VITAMIN D3) 2000 UNITS capsule, Take  by mouth 2 (two) times a day.,  Disp: , Rfl:   •  Cinnamon 500 MG capsule, Take 2,000 mg by mouth Daily., Disp: , Rfl:   •  Coenzyme Q10 100 MG tablet, Take 2 tablets by mouth Daily., Disp: , Rfl:   •  fenofibric acid (TRILIPIX) 135 MG capsule delayed-release delayed release capsule, TAKE ONE CAPSULE BY MOUTH DAILY, Disp: 30 capsule, Rfl: 2  •  FLORASTOR 250 MG capsule, TAKE ONE CAPSULE BY MOUTH TWICE A DAY, Disp: 60 capsule, Rfl: 3  •  folic acid (FOLVITE) 400 MCG tablet, Take  by mouth 1 (one) time per week., Disp: , Rfl:   •  hydrochlorothiazide (HYDRODIURIL) 25 MG tablet, Take 25 mg by mouth Daily., Disp: , Rfl:   •  JANUVIA 100 MG tablet, TAKE ONE TABLET BY MOUTH DAILY, Disp: 30 tablet, Rfl: 2  •  JARDIANCE 10 MG tablet, , Disp: , Rfl:   •  l-methylfolate-algae-B6-B12 (METANX) 3-90.314-2-35 MG capsule capsule, Take  by mouth., Disp: , Rfl:   •  Lecithin 1200 MG capsule, Take  by mouth Daily., Disp: , Rfl:   •  metFORMIN (GLUCOPHAGE) 500 MG tablet, Take 2 tablets by mouth 2 (Two) Times a Day With Meals., Disp: 120 tablet, Rfl: 2  •  metoprolol succinate XL (TOPROL-XL) 100 MG 24 hr tablet, TAKE 1 TABLET BY MOUTH ONE TIME A DAY , Disp: 30 tablet, Rfl: 0  •  Multiple Vitamins-Minerals (ICAPS AREDS 2 PO), Take  by mouth Daily., Disp: , Rfl:   •  pravastatin (PRAVACHOL) 40 MG tablet, TAKE ONE TABLET BY MOUTH EVERY NIGHT AT BEDTIME, Disp: 30 tablet, Rfl: 2  •  sildenafil (VIAGRA) 50 MG tablet, Take 1 tablet by mouth Daily As Needed for erectile dysfunction., Disp: 10 tablet, Rfl: 5  •  triamterene-hydrochlorothiazide (DYAZIDE) 37.5-25 MG per capsule, TAKE ONE CAPSULE BY MOUTH DAILY, Disp: 30 capsule, Rfl: 2  •  vitamin E 1000 UNIT capsule, Take 1,000 Units by mouth Daily., Disp: , Rfl:     PHYSICAL EXAMINATION:   /73  Pulse 74  Temp 97 °F (36.1 °C) (Temporal Artery )   Resp 15  Wt 201 lb (91.2 kg)  BMI 31.48 kg/m2  ECOG1  GENERAL: Age appropriate. No acute distress.   HEENT: Head atraumatic, normocephalic.   NECK: Supple. No JVD. No  lymphadenopathy.   LUNGS: Clear to auscultation bilaterally. No wheezing. No rhonchi.   HEART: Regular rate and rhythm. S1, S2, no murmurs.   ABDOMEN: Soft, nontender, nondistended. Bowel sounds positive. No  hepatosplenomegaly.   EXTREMITIES: No clubbing, cyanosis, or edema.   SKIN: No rashes. No purpura.   NEUROLOGIC: Awake and oriented x3. Strength 5 out of 5 in all muscle groups.     Lab Requisition on 09/25/2017   Component Date Value Ref Range Status   • PSA 09/25/2017 2.290  0.000 - 4.000 ng/mL Final   • Glucose 09/25/2017 113* 70 - 100 mg/dL Final   • BUN 09/25/2017 22  9 - 23 mg/dL Final   • Creatinine 09/25/2017 1.10  0.60 - 1.30 mg/dL Final   • Sodium 09/25/2017 144  132 - 146 mmol/L Final   • Potassium 09/25/2017 4.1  3.5 - 5.5 mmol/L Final   • Chloride 09/25/2017 103  99 - 109 mmol/L Final   • CO2 09/25/2017 28.0  20.0 - 31.0 mmol/L Final   • Calcium 09/25/2017 9.8  8.7 - 10.4 mg/dL Final   • Total Protein 09/25/2017 7.6  5.7 - 8.2 g/dL Final   • Albumin 09/25/2017 5.00* 3.20 - 4.80 g/dL Final   • ALT (SGPT) 09/25/2017 28  7 - 40 U/L Final   • AST (SGOT) 09/25/2017 30  0 - 33 U/L Final   • Alkaline Phosphatase 09/25/2017 40  25 - 100 U/L Final   • Total Bilirubin 09/25/2017 0.9  0.3 - 1.2 mg/dL Final   • eGFR Non  Amer 09/25/2017 64  >60 mL/min/1.73 Final   • Globulin 09/25/2017 2.6  gm/dL Final   • A/G Ratio 09/25/2017 1.9  1.5 - 2.5 g/dL Final   • BUN/Creatinine Ratio 09/25/2017 20.0  7.0 - 25.0 Final   • Anion Gap 09/25/2017 13.0* 3.0 - 11.0 mmol/L Final   • WBC 09/25/2017 6.12  3.50 - 10.80 10*3/mm3 Final   • RBC 09/25/2017 5.30  4.20 - 5.76 10*6/mm3 Final   • Hemoglobin 09/25/2017 16.0  13.1 - 17.5 g/dL Final   • Hematocrit 09/25/2017 48.0  38.9 - 50.9 % Final   • MCV 09/25/2017 90.6  80.0 - 99.0 fL Final   • MCH 09/25/2017 30.2  27.0 - 31.0 pg Final   • MCHC 09/25/2017 33.3  32.0 - 36.0 g/dL Final   • RDW 09/25/2017 13.5  11.3 - 14.5 % Final   • RDW-SD 09/25/2017 44.3  37.0 - 54.0 fl Final    • MPV 09/25/2017 9.8  6.0 - 12.0 fL Final   • Platelets 09/25/2017 184  150 - 450 10*3/mm3 Final   • Neutrophil % 09/25/2017 56.5  41.0 - 71.0 % Final   • Lymphocyte % 09/25/2017 28.9  24.0 - 44.0 % Final   • Monocyte % 09/25/2017 8.7  0.0 - 12.0 % Final   • Eosinophil % 09/25/2017 5.2* 0.0 - 3.0 % Final   • Basophil % 09/25/2017 0.5  0.0 - 1.0 % Final   • Immature Grans % 09/25/2017 0.2  0.0 - 0.6 % Final   • Neutrophils, Absolute 09/25/2017 3.46  1.50 - 8.30 10*3/mm3 Final   • Lymphocytes, Absolute 09/25/2017 1.77  0.60 - 4.80 10*3/mm3 Final   • Monocytes, Absolute 09/25/2017 0.53  0.00 - 1.00 10*3/mm3 Final   • Eosinophils, Absolute 09/25/2017 0.32* 0.00 - 0.30 10*3/mm3 Final   • Basophils, Absolute 09/25/2017 0.03  0.00 - 0.20 10*3/mm3 Final   • Immature Grans, Absolute 09/25/2017 0.01  0.00 - 0.03 10*3/mm3 Final   Hospital Outpatient Visit on 09/15/2017   Component Date Value Ref Range Status   • Creatinine 09/15/2017 1.10  0.60 - 1.30 mg/dL Final    Serial Number: [id number]:  [id number]        ASSESSMENT: The patient is a very pleasant 80-year-old gentleman with relapsed  prostate cancer.     PROBLEM LIST:  1. Prostate cancer, initially presented as E1lH6T6 status post radical  prostatectomy 2000.  2. Rise in PSA gradually over the last 2 years, most recently 5.4 on  05/20/2014.  3. Multiple comorbidities including type 2 diabetes, hypertension, morbid  obesity, osteoarthritis, and hypercholesterolemia.  4. Erectile dysfunction.   5.  Family history of colon cancer     PLAN:  1. I did go over the results in detail with the patient. I explained to him that  his his PSA is down to 3.  His bone scan as well as CAT scans are normal.. I will continue Eligard 22.5mg q3 month.   2. The patient will come back to see me in 1 months with repeat blood work as  well as serum PSA.   3. I will continue Viagra as needed for erectile dysfunction.   4.  We'll continue Januvia as well as metformin for type 2 diabetes  5.   We'll continue pravastatin for hypercholesterolemia  6.  Patient need to have 5 year follow-up study which would be due 11/2021.  7. We reviewed the potential side effects of the treatment including hot flashes, impotence, joint pain, decrease in bone density, mood or sleep disturbance, and asthenia.    Shannan Ramon MD  9/27/2017

## 2017-09-29 ENCOUNTER — OFFICE VISIT (OUTPATIENT)
Dept: INTERNAL MEDICINE | Facility: CLINIC | Age: 82
End: 2017-09-29

## 2017-09-29 VITALS
DIASTOLIC BLOOD PRESSURE: 70 MMHG | BODY MASS INDEX: 31.58 KG/M2 | WEIGHT: 201.6 LBS | HEART RATE: 78 BPM | OXYGEN SATURATION: 99 % | SYSTOLIC BLOOD PRESSURE: 120 MMHG

## 2017-09-29 DIAGNOSIS — I10 ESSENTIAL HYPERTENSION: Primary | ICD-10-CM

## 2017-09-29 DIAGNOSIS — C61 PROSTATE CANCER (HCC): ICD-10-CM

## 2017-09-29 DIAGNOSIS — E78.00 HYPERCHOLESTEREMIA: ICD-10-CM

## 2017-09-29 PROCEDURE — 90662 IIV NO PRSV INCREASED AG IM: CPT | Performed by: INTERNAL MEDICINE

## 2017-09-29 PROCEDURE — 99213 OFFICE O/P EST LOW 20 MIN: CPT | Performed by: INTERNAL MEDICINE

## 2017-09-29 PROCEDURE — 90471 IMMUNIZATION ADMIN: CPT | Performed by: INTERNAL MEDICINE

## 2017-09-29 RX ORDER — MECOBAL/LEVOMEFOLAT CA/B6 PHOS 2-3-35 MG
TABLET ORAL
Qty: 180 TABLET | Refills: 1 | Status: SHIPPED | OUTPATIENT
Start: 2017-09-29 | End: 2018-03-30

## 2017-10-06 ENCOUNTER — TELEPHONE (OUTPATIENT)
Dept: ONCOLOGY | Facility: CLINIC | Age: 82
End: 2017-10-06

## 2017-10-06 NOTE — TELEPHONE ENCOUNTER
Patient advised that he only needed the Casodex with the first injection, and no longer needs to take it.

## 2017-10-06 NOTE — TELEPHONE ENCOUNTER
----- Message from Wendi Jimenez sent at 10/6/2017  3:01 PM EDT -----  Regarding: badin casodex  Contact: [phone]  Does pt need to get a script for this drug  Prior to his injection?  Pt states in the past, he has taken it for 5 to 7 days days prior to the injection.    Injection appt date 10th    Please call patient to let him know      phill philippe [phone]

## 2017-10-10 ENCOUNTER — INFUSION (OUTPATIENT)
Dept: ONCOLOGY | Facility: HOSPITAL | Age: 82
End: 2017-10-10

## 2017-10-10 VITALS
SYSTOLIC BLOOD PRESSURE: 118 MMHG | DIASTOLIC BLOOD PRESSURE: 64 MMHG | TEMPERATURE: 97.2 F | RESPIRATION RATE: 18 BRPM | HEART RATE: 68 BPM | BODY MASS INDEX: 31.64 KG/M2 | WEIGHT: 202 LBS

## 2017-10-10 DIAGNOSIS — C61 PROSTATE CANCER (HCC): ICD-10-CM

## 2017-10-10 LAB
ALBUMIN SERPL-MCNC: 4.7 G/DL (ref 3.5–5)
ALBUMIN/GLOB SERPL: 1.6 G/DL (ref 1–2)
ALP SERPL-CCNC: 36 U/L (ref 38–126)
ALT SERPL W P-5'-P-CCNC: 59 U/L (ref 13–69)
ANION GAP SERPL CALCULATED.3IONS-SCNC: 18.9 MMOL/L
AST SERPL-CCNC: 41 U/L (ref 15–46)
BASOPHILS # BLD AUTO: 0.04 10*3/MM3 (ref 0–0.2)
BASOPHILS NFR BLD AUTO: 0.7 % (ref 0–2.5)
BILIRUB SERPL-MCNC: 0.7 MG/DL (ref 0.2–1.3)
BUN BLD-MCNC: 23 MG/DL (ref 7–20)
BUN/CREAT SERPL: 23 (ref 6.3–21.9)
CALCIUM SPEC-SCNC: 10 MG/DL (ref 8.4–10.2)
CHLORIDE SERPL-SCNC: 104 MMOL/L (ref 98–107)
CO2 SERPL-SCNC: 27 MMOL/L (ref 26–30)
CREAT BLD-MCNC: 1 MG/DL (ref 0.6–1.3)
DEPRECATED RDW RBC AUTO: 43.8 FL (ref 37–54)
EOSINOPHIL # BLD AUTO: 0.4 10*3/MM3 (ref 0–0.7)
EOSINOPHIL NFR BLD AUTO: 6.8 % (ref 0–7)
ERYTHROCYTE [DISTWIDTH] IN BLOOD BY AUTOMATED COUNT: 13.2 % (ref 11.5–14.5)
GFR SERPL CREATININE-BSD FRML MDRD: 72 ML/MIN/1.73
GLOBULIN UR ELPH-MCNC: 2.9 GM/DL
GLUCOSE BLD-MCNC: 150 MG/DL (ref 74–98)
HCT VFR BLD AUTO: 45.5 % (ref 42–52)
HGB BLD-MCNC: 14.7 G/DL (ref 14–18)
IMM GRANULOCYTES # BLD: 0.02 10*3/MM3 (ref 0–0.06)
IMM GRANULOCYTES NFR BLD: 0.3 % (ref 0–0.6)
LYMPHOCYTES # BLD AUTO: 1.72 10*3/MM3 (ref 0.6–3.4)
LYMPHOCYTES NFR BLD AUTO: 29.4 % (ref 10–50)
MCH RBC QN AUTO: 29.6 PG (ref 27–31)
MCHC RBC AUTO-ENTMCNC: 32.3 G/DL (ref 30–37)
MCV RBC AUTO: 91.5 FL (ref 80–94)
MONOCYTES # BLD AUTO: 0.68 10*3/MM3 (ref 0–0.9)
MONOCYTES NFR BLD AUTO: 11.6 % (ref 0–12)
NEUTROPHILS # BLD AUTO: 3 10*3/MM3 (ref 2–6.9)
NEUTROPHILS NFR BLD AUTO: 51.2 % (ref 37–80)
NRBC BLD MANUAL-RTO: 0 /100 WBC (ref 0–0)
PLATELET # BLD AUTO: 157 10*3/MM3 (ref 130–400)
PMV BLD AUTO: 10.2 FL (ref 6–12)
POTASSIUM BLD-SCNC: 4.9 MMOL/L (ref 3.5–5.1)
PROT SERPL-MCNC: 7.6 G/DL (ref 6.3–8.2)
PSA SERPL-MCNC: 0.61 NG/ML (ref 0.06–4)
RBC # BLD AUTO: 4.97 10*6/MM3 (ref 4.7–6.1)
SODIUM BLD-SCNC: 145 MMOL/L (ref 137–145)
WBC NRBC COR # BLD: 5.86 10*3/MM3 (ref 4.8–10.8)

## 2017-10-10 PROCEDURE — 36415 COLL VENOUS BLD VENIPUNCTURE: CPT

## 2017-10-10 PROCEDURE — 85025 COMPLETE CBC W/AUTO DIFF WBC: CPT

## 2017-10-10 PROCEDURE — 84153 ASSAY OF PSA TOTAL: CPT

## 2017-10-10 PROCEDURE — 80053 COMPREHEN METABOLIC PANEL: CPT

## 2017-10-22 RX ORDER — METOPROLOL SUCCINATE 100 MG/1
TABLET, EXTENDED RELEASE ORAL
Qty: 30 TABLET | Refills: 0 | Status: SHIPPED | OUTPATIENT
Start: 2017-10-22 | End: 2017-11-26 | Stop reason: SDUPTHER

## 2017-11-05 RX ORDER — PRAVASTATIN SODIUM 40 MG
TABLET ORAL
Qty: 30 TABLET | Refills: 1 | Status: SHIPPED | OUTPATIENT
Start: 2017-11-05 | End: 2018-01-07 | Stop reason: SDUPTHER

## 2017-11-07 ENCOUNTER — LAB (OUTPATIENT)
Dept: LAB | Facility: HOSPITAL | Age: 82
End: 2017-11-07

## 2017-11-07 DIAGNOSIS — C61 PROSTATE CANCER (HCC): ICD-10-CM

## 2017-11-07 LAB
ALBUMIN SERPL-MCNC: 4.6 G/DL (ref 3.2–4.8)
ALBUMIN/GLOB SERPL: 1.8 G/DL (ref 1.5–2.5)
ALP SERPL-CCNC: 39 U/L (ref 25–100)
ALT SERPL W P-5'-P-CCNC: 77 U/L (ref 7–40)
ANION GAP SERPL CALCULATED.3IONS-SCNC: 10 MMOL/L (ref 3–11)
AST SERPL-CCNC: 75 U/L (ref 0–33)
BASOPHILS # BLD AUTO: 0.03 10*3/MM3 (ref 0–0.2)
BASOPHILS NFR BLD AUTO: 0.6 % (ref 0–1)
BILIRUB SERPL-MCNC: 0.5 MG/DL (ref 0.3–1.2)
BUN BLD-MCNC: 20 MG/DL (ref 9–23)
BUN/CREAT SERPL: 18.2 (ref 7–25)
CALCIUM SPEC-SCNC: 9.5 MG/DL (ref 8.7–10.4)
CHLORIDE SERPL-SCNC: 102 MMOL/L (ref 99–109)
CO2 SERPL-SCNC: 26 MMOL/L (ref 20–31)
CREAT BLD-MCNC: 1.1 MG/DL (ref 0.6–1.3)
DEPRECATED RDW RBC AUTO: 43.9 FL (ref 37–54)
EOSINOPHIL # BLD AUTO: 0.35 10*3/MM3 (ref 0–0.3)
EOSINOPHIL NFR BLD AUTO: 6.8 % (ref 0–3)
ERYTHROCYTE [DISTWIDTH] IN BLOOD BY AUTOMATED COUNT: 13.4 % (ref 11.3–14.5)
GFR SERPL CREATININE-BSD FRML MDRD: 64 ML/MIN/1.73
GLOBULIN UR ELPH-MCNC: 2.5 GM/DL
GLUCOSE BLD-MCNC: 271 MG/DL (ref 70–100)
HCT VFR BLD AUTO: 43.8 % (ref 38.9–50.9)
HGB BLD-MCNC: 14.4 G/DL (ref 13.1–17.5)
IMM GRANULOCYTES # BLD: 0.03 10*3/MM3 (ref 0–0.03)
IMM GRANULOCYTES NFR BLD: 0.6 % (ref 0–0.6)
LYMPHOCYTES # BLD AUTO: 1.42 10*3/MM3 (ref 0.6–4.8)
LYMPHOCYTES NFR BLD AUTO: 27.6 % (ref 24–44)
MCH RBC QN AUTO: 29.8 PG (ref 27–31)
MCHC RBC AUTO-ENTMCNC: 32.9 G/DL (ref 32–36)
MCV RBC AUTO: 90.5 FL (ref 80–99)
MONOCYTES # BLD AUTO: 0.62 10*3/MM3 (ref 0–1)
MONOCYTES NFR BLD AUTO: 12 % (ref 0–12)
NEUTROPHILS # BLD AUTO: 2.7 10*3/MM3 (ref 1.5–8.3)
NEUTROPHILS NFR BLD AUTO: 52.4 % (ref 41–71)
PLATELET # BLD AUTO: 177 10*3/MM3 (ref 150–450)
PMV BLD AUTO: 10.6 FL (ref 6–12)
POTASSIUM BLD-SCNC: 4 MMOL/L (ref 3.5–5.5)
PROT SERPL-MCNC: 7.1 G/DL (ref 5.7–8.2)
PSA SERPL-MCNC: 0.2 NG/ML (ref 0–4)
RBC # BLD AUTO: 4.84 10*6/MM3 (ref 4.2–5.76)
SODIUM BLD-SCNC: 138 MMOL/L (ref 132–146)
WBC NRBC COR # BLD: 5.15 10*3/MM3 (ref 3.5–10.8)

## 2017-11-07 PROCEDURE — 85025 COMPLETE CBC W/AUTO DIFF WBC: CPT | Performed by: INTERNAL MEDICINE

## 2017-11-07 PROCEDURE — 80053 COMPREHEN METABOLIC PANEL: CPT | Performed by: INTERNAL MEDICINE

## 2017-11-07 PROCEDURE — 84153 ASSAY OF PSA TOTAL: CPT | Performed by: INTERNAL MEDICINE

## 2017-11-07 PROCEDURE — 36415 COLL VENOUS BLD VENIPUNCTURE: CPT

## 2017-11-07 NOTE — PROGRESS NOTES
DATE OF VISIT: 11/8/2017     REASON FOR VISIT:   1. Prostate cancer. Presented with stage I, [id number], PSA at diagnosis 3 status  post prostatectomy in 2000.  2. Relapsed disease with rise in PSA gradually over the last couple of years,  most recent PSA 7.1 ng/mL on 01/25/2016.      HISTORY OF PRESENT ILLNESS: The patient is a very pleasant 80-year-old  gentleman with past medical history significant for prostate cancer status post  radical prostatectomy in 2000. The patient never received adjuvant treatments,  neither radiation, GnRH or antiandrogen treatment. The patient's PSA started  to go up gradually over the last 2 years. Patient was referred to me on  06/13/2014 and I did restaging workup that came back negative including bone  scan, CAT scans chest, abdomen and pelvis. We elected to proceed with watchful  waiting. Patient is here today in scheduled followup visit.     SUBJECTIVE: The patient has been doing fairly well. He denied any fever or  chills. He denied any night sweats. He denied any diarrhea. No headaches. No  abdominal pain. No problem urinating.      REVIEW OF SYSTEMS: All the other 9 systems are reviewed by me and negative  except what is mentioned in HPI.      PAST MEDICAL HISTORY/SOCIAL HISTORY/FAMILY HISTORY: Unchanged from my prior  documentation on 06/13/2014.       Current Outpatient Prescriptions:   •  allopurinol (ZYLOPRIM) 300 MG tablet, TAKE ONE TABLET BY MOUTH DAILY, Disp: 30 tablet, Rfl: 2  •  ascorbic acid (VITAMIN C) 1000 MG tablet, Take 1,000 mg by mouth Daily., Disp: , Rfl:   •  aspirin 81 MG EC tablet, Take 81 mg by mouth Daily., Disp: , Rfl:   •  B Complex Vitamins (VITAMIN B COMPLEX PO), Take  by mouth Daily., Disp: , Rfl:   •  CRISTINA CONTOUR TEST test strip, , Disp: , Rfl:   •  bicalutamide (CASODEX) 50 MG chemo tablet, Take 1 tablet by mouth Daily. For 10 days, Disp: 10 tablet, Rfl: 0  •  Cholecalciferol (VITAMIN D3) 2000 UNITS capsule, Take  by mouth 2 (two) times a day.,  Disp: , Rfl:   •  Cinnamon 500 MG capsule, Take 2,000 mg by mouth Daily., Disp: , Rfl:   •  Coenzyme Q10 100 MG tablet, Take 2 tablets by mouth Daily., Disp: , Rfl:   •  fenofibric acid (TRILIPIX) 135 MG capsule delayed-release delayed release capsule, TAKE ONE CAPSULE BY MOUTH DAILY, Disp: 30 capsule, Rfl: 2  •  FLORASTOR 250 MG capsule, TAKE ONE CAPSULE BY MOUTH TWICE A DAY, Disp: 60 capsule, Rfl: 3  •  folic acid (FOLVITE) 400 MCG tablet, Take  by mouth 1 (one) time per week., Disp: , Rfl:   •  hydrochlorothiazide (HYDRODIURIL) 25 MG tablet, Take 25 mg by mouth Daily., Disp: , Rfl:   •  JANUVIA 100 MG tablet, TAKE ONE TABLET BY MOUTH DAILY, Disp: 30 tablet, Rfl: 2  •  JARDIANCE 10 MG tablet, , Disp: , Rfl:   •  l-methylfolate-algae-B6-B12 (METANX) 3-90.314-2-35 MG capsule capsule, Take  by mouth., Disp: , Rfl:   •  L-Methylfolate-B6-B12 3-35-2 MG tablet, 1 po bid, Disp: 180 tablet, Rfl: 1  •  Lecithin 1200 MG capsule, Take  by mouth Daily., Disp: , Rfl:   •  metFORMIN (GLUCOPHAGE) 500 MG tablet, Take 2 tablets by mouth 2 (Two) Times a Day With Meals., Disp: 120 tablet, Rfl: 2  •  metoprolol succinate XL (TOPROL-XL) 100 MG 24 hr tablet, TAKE 1 TABLET BY MOUTH ONE TIME A DAY , Disp: 30 tablet, Rfl: 0  •  Multiple Vitamins-Minerals (ICAPS AREDS 2 PO), Take  by mouth Daily., Disp: , Rfl:   •  pravastatin (PRAVACHOL) 40 MG tablet, TAKE ONE TABLET BY MOUTH EVERY NIGHT AT BEDTIME, Disp: 30 tablet, Rfl: 1  •  sildenafil (VIAGRA) 50 MG tablet, Take 1 tablet by mouth Daily As Needed for erectile dysfunction., Disp: 10 tablet, Rfl: 5  •  triamterene-hydrochlorothiazide (DYAZIDE) 37.5-25 MG per capsule, TAKE ONE CAPSULE BY MOUTH DAILY, Disp: 30 capsule, Rfl: 2  •  vitamin E 1000 UNIT capsule, Take 1,000 Units by mouth Daily., Disp: , Rfl:     PHYSICAL EXAMINATION:   /67  Pulse 80  Temp 98 °F (36.7 °C) (Temporal Artery )   Resp 16  Wt 209 lb (94.8 kg)  BMI 32.73 kg/m2  ECOG1  GENERAL: Age appropriate. No acute  distress.   HEENT: Head atraumatic, normocephalic.   NECK: Supple. No JVD. No lymphadenopathy.   LUNGS: Clear to auscultation bilaterally. No wheezing. No rhonchi.   HEART: Regular rate and rhythm. S1, S2, no murmurs.   ABDOMEN: Soft, nontender, nondistended. Bowel sounds positive. No  hepatosplenomegaly.   EXTREMITIES: No clubbing, cyanosis, or edema.   SKIN: No rashes. No purpura.   NEUROLOGIC: Awake and oriented x3. Strength 5 out of 5 in all muscle groups.     Lab on 11/07/2017   Component Date Value Ref Range Status   • Glucose 11/07/2017 271* 70 - 100 mg/dL Final   • BUN 11/07/2017 20  9 - 23 mg/dL Final   • Creatinine 11/07/2017 1.10  0.60 - 1.30 mg/dL Final   • Sodium 11/07/2017 138  132 - 146 mmol/L Final   • Potassium 11/07/2017 4.0  3.5 - 5.5 mmol/L Final   • Chloride 11/07/2017 102  99 - 109 mmol/L Final   • CO2 11/07/2017 26.0  20.0 - 31.0 mmol/L Final   • Calcium 11/07/2017 9.5  8.7 - 10.4 mg/dL Final   • Total Protein 11/07/2017 7.1  5.7 - 8.2 g/dL Final   • Albumin 11/07/2017 4.60  3.20 - 4.80 g/dL Final   • ALT (SGPT) 11/07/2017 77* 7 - 40 U/L Final   • AST (SGOT) 11/07/2017 75* 0 - 33 U/L Final   • Alkaline Phosphatase 11/07/2017 39  25 - 100 U/L Final   • Total Bilirubin 11/07/2017 0.5  0.3 - 1.2 mg/dL Final   • eGFR Non  Amer 11/07/2017 64  >60 mL/min/1.73 Final   • Globulin 11/07/2017 2.5  gm/dL Final   • A/G Ratio 11/07/2017 1.8  1.5 - 2.5 g/dL Final   • BUN/Creatinine Ratio 11/07/2017 18.2  7.0 - 25.0 Final   • Anion Gap 11/07/2017 10.0  3.0 - 11.0 mmol/L Final   • PSA 11/07/2017 0.200  0.000 - 4.000 ng/mL Final   • WBC 11/07/2017 5.15  3.50 - 10.80 10*3/mm3 Final   • RBC 11/07/2017 4.84  4.20 - 5.76 10*6/mm3 Final   • Hemoglobin 11/07/2017 14.4  13.1 - 17.5 g/dL Final   • Hematocrit 11/07/2017 43.8  38.9 - 50.9 % Final   • MCV 11/07/2017 90.5  80.0 - 99.0 fL Final   • MCH 11/07/2017 29.8  27.0 - 31.0 pg Final   • MCHC 11/07/2017 32.9  32.0 - 36.0 g/dL Final   • RDW 11/07/2017 13.4   11.3 - 14.5 % Final   • RDW-SD 11/07/2017 43.9  37.0 - 54.0 fl Final   • MPV 11/07/2017 10.6  6.0 - 12.0 fL Final   • Platelets 11/07/2017 177  150 - 450 10*3/mm3 Final   • Neutrophil % 11/07/2017 52.4  41.0 - 71.0 % Final   • Lymphocyte % 11/07/2017 27.6  24.0 - 44.0 % Final   • Monocyte % 11/07/2017 12.0  0.0 - 12.0 % Final   • Eosinophil % 11/07/2017 6.8* 0.0 - 3.0 % Final   • Basophil % 11/07/2017 0.6  0.0 - 1.0 % Final   • Immature Grans % 11/07/2017 0.6  0.0 - 0.6 % Final   • Neutrophils, Absolute 11/07/2017 2.70  1.50 - 8.30 10*3/mm3 Final   • Lymphocytes, Absolute 11/07/2017 1.42  0.60 - 4.80 10*3/mm3 Final   • Monocytes, Absolute 11/07/2017 0.62  0.00 - 1.00 10*3/mm3 Final   • Eosinophils, Absolute 11/07/2017 0.35* 0.00 - 0.30 10*3/mm3 Final   • Basophils, Absolute 11/07/2017 0.03  0.00 - 0.20 10*3/mm3 Final   • Immature Grans, Absolute 11/07/2017 0.03  0.00 - 0.03 10*3/mm3 Final        ASSESSMENT: The patient is a very pleasant 80-year-old gentleman with relapsed  prostate cancer.     PROBLEM LIST:  1. Prostate cancer, initially presented as [id number] status post radical  prostatectomy 2000.  2. Rise in PSA gradually over the last 2 years,   3. Multiple comorbidities including type 2 diabetes, hypertension, morbid  obesity, osteoarthritis, and hypercholesterolemia.  4. Erectile dysfunction.   5.  Family history of colon cancer     PLAN:  1. I did go over the results in detail with the patient. I explained to him that  his his PSA is down to 0.2.  His bone scan as well as CAT scans are normal.. I will continue Eligard 22.5mg q3 month.   2. The patient will come back to see me in 6 weeks with repeat blood work as  well as serum PSA.   3. I will continue Viagra as needed for erectile dysfunction.   4.  We'll continue Januvia as well as metformin for type 2 diabetes  5.  We'll continue pravastatin for hypercholesterolemia  6.  Patient need to have 5 year follow-up study which would be due 11/2021.  7. We  reviewed the potential side effects of the treatment including hot flashes, impotence, joint pain, decrease in bone density, mood or sleep disturbance, and asthenia.    Shannan Ramon MD  11/8/2017

## 2017-11-08 ENCOUNTER — OFFICE VISIT (OUTPATIENT)
Dept: ONCOLOGY | Facility: CLINIC | Age: 82
End: 2017-11-08

## 2017-11-08 VITALS
SYSTOLIC BLOOD PRESSURE: 146 MMHG | RESPIRATION RATE: 16 BRPM | DIASTOLIC BLOOD PRESSURE: 67 MMHG | BODY MASS INDEX: 32.73 KG/M2 | WEIGHT: 209 LBS | TEMPERATURE: 98 F | HEART RATE: 80 BPM

## 2017-11-08 DIAGNOSIS — C61 PROSTATE CANCER (HCC): Primary | ICD-10-CM

## 2017-11-08 PROCEDURE — 99214 OFFICE O/P EST MOD 30 MIN: CPT | Performed by: INTERNAL MEDICINE

## 2017-11-13 RX ORDER — FENOFIBRIC ACID 135 MG/1
CAPSULE, DELAYED RELEASE ORAL
Qty: 30 CAPSULE | Refills: 1 | Status: SHIPPED | OUTPATIENT
Start: 2017-11-13 | End: 2018-01-21 | Stop reason: SDUPTHER

## 2017-11-26 RX ORDER — METOPROLOL SUCCINATE 100 MG/1
TABLET, EXTENDED RELEASE ORAL
Qty: 30 TABLET | Refills: 0 | Status: SHIPPED | OUTPATIENT
Start: 2017-11-26 | End: 2017-12-24 | Stop reason: SDUPTHER

## 2017-12-04 DIAGNOSIS — C61 PROSTATE CANCER (HCC): Primary | ICD-10-CM

## 2017-12-06 ENCOUNTER — INFUSION (OUTPATIENT)
Dept: ONCOLOGY | Facility: HOSPITAL | Age: 82
End: 2017-12-06

## 2017-12-06 VITALS
HEART RATE: 71 BPM | SYSTOLIC BLOOD PRESSURE: 149 MMHG | TEMPERATURE: 97 F | DIASTOLIC BLOOD PRESSURE: 64 MMHG | RESPIRATION RATE: 18 BRPM

## 2017-12-06 DIAGNOSIS — C61 PROSTATE CANCER (HCC): Primary | ICD-10-CM

## 2017-12-06 PROCEDURE — 25010000002 LEUPROLIDE 22.5 MG KIT: Performed by: INTERNAL MEDICINE

## 2017-12-06 PROCEDURE — 96402 CHEMO HORMON ANTINEOPL SQ/IM: CPT

## 2017-12-06 RX ADMIN — LEUPROLIDE ACETATE 22.5 MG: KIT SUBCUTANEOUS at 14:42

## 2017-12-13 ENCOUNTER — TELEPHONE (OUTPATIENT)
Dept: ONCOLOGY | Facility: CLINIC | Age: 82
End: 2017-12-13

## 2017-12-13 NOTE — TELEPHONE ENCOUNTER
----- Message from Malia Atkinson MA sent at 12/13/2017  2:22 PM EST -----  Regarding: RE: [facility]- lab results   Contact: [phone]  They only did a CMP and PSA this time. Only 1 serum tube was sent to lab [facility].     Results are in his chart  ----- Message -----     From: Shannan Garcia RN     Sent: 12/13/2017   2:12 PM       To: Malia Atkinson MA  Subject: RE: [place]- lab results                           Just let me know once we have it, please!    ----- Message -----     From: Malia Atkinson MA     Sent: 12/13/2017   1:59 PM       To: [facility]  Subject: [place]- lab results                               Pt had labs drawn last week out at [facility] ( now owned by [employer]) which would explain why his results aren't in the system. I am calling to get results faxed to us. Please call pt w results.    He wants me to mail him a copy of his labs once you have given him his results.     Thanks

## 2017-12-26 RX ORDER — ALLOPURINOL 300 MG/1
TABLET ORAL
Qty: 30 TABLET | Refills: 1 | Status: SHIPPED | OUTPATIENT
Start: 2017-12-26 | End: 2018-03-04 | Stop reason: SDUPTHER

## 2017-12-26 RX ORDER — SITAGLIPTIN 100 MG/1
TABLET, FILM COATED ORAL
Qty: 30 TABLET | Refills: 1 | Status: SHIPPED | OUTPATIENT
Start: 2017-12-26 | End: 2018-03-04 | Stop reason: SDUPTHER

## 2017-12-26 RX ORDER — METOPROLOL SUCCINATE 100 MG/1
TABLET, EXTENDED RELEASE ORAL
Qty: 30 TABLET | Refills: 2 | Status: SHIPPED | OUTPATIENT
Start: 2017-12-26 | End: 2018-03-27 | Stop reason: SDUPTHER

## 2017-12-26 RX ORDER — TRIAMTERENE AND HYDROCHLOROTHIAZIDE 37.5; 25 MG/1; MG/1
CAPSULE ORAL
Qty: 30 CAPSULE | Refills: 1 | Status: SHIPPED | OUTPATIENT
Start: 2017-12-26 | End: 2018-03-04 | Stop reason: SDUPTHER

## 2018-01-08 RX ORDER — PRAVASTATIN SODIUM 40 MG
TABLET ORAL
Qty: 30 TABLET | Refills: 0 | Status: SHIPPED | OUTPATIENT
Start: 2018-01-08 | End: 2018-02-05 | Stop reason: SDUPTHER

## 2018-01-22 RX ORDER — FENOFIBRIC ACID 135 MG/1
CAPSULE, DELAYED RELEASE ORAL
Qty: 30 CAPSULE | Refills: 0 | Status: SHIPPED | OUTPATIENT
Start: 2018-01-22 | End: 2018-02-19 | Stop reason: SDUPTHER

## 2018-02-05 RX ORDER — PRAVASTATIN SODIUM 40 MG
TABLET ORAL
Qty: 30 TABLET | Refills: 0 | Status: SHIPPED | OUTPATIENT
Start: 2018-02-05 | End: 2018-03-04 | Stop reason: SDUPTHER

## 2018-02-12 ENCOUNTER — LAB (OUTPATIENT)
Dept: LAB | Facility: HOSPITAL | Age: 83
End: 2018-02-12

## 2018-02-12 DIAGNOSIS — C61 PROSTATE CANCER (HCC): ICD-10-CM

## 2018-02-12 LAB
ALBUMIN SERPL-MCNC: 4.8 G/DL (ref 3.5–5)
ALBUMIN/GLOB SERPL: 1.5 G/DL (ref 1–2)
ALP SERPL-CCNC: 53 U/L (ref 38–126)
ALT SERPL W P-5'-P-CCNC: 79 U/L (ref 13–69)
ANION GAP SERPL CALCULATED.3IONS-SCNC: 18.9 MMOL/L
AST SERPL-CCNC: 79 U/L (ref 15–46)
BASOPHILS # BLD AUTO: 0.03 10*3/MM3 (ref 0–0.2)
BASOPHILS NFR BLD AUTO: 0.6 % (ref 0–2.5)
BILIRUB SERPL-MCNC: 0.8 MG/DL (ref 0.2–1.3)
BUN BLD-MCNC: 16 MG/DL (ref 7–20)
BUN/CREAT SERPL: 17.8 (ref 6.3–21.9)
CALCIUM SPEC-SCNC: 10.1 MG/DL (ref 8.4–10.2)
CHLORIDE SERPL-SCNC: 103 MMOL/L (ref 98–107)
CO2 SERPL-SCNC: 27 MMOL/L (ref 26–30)
CREAT BLD-MCNC: 0.9 MG/DL (ref 0.6–1.3)
DEPRECATED RDW RBC AUTO: 44.8 FL (ref 37–54)
EOSINOPHIL # BLD AUTO: 0.27 10*3/MM3 (ref 0–0.7)
EOSINOPHIL NFR BLD AUTO: 5.3 % (ref 0–7)
ERYTHROCYTE [DISTWIDTH] IN BLOOD BY AUTOMATED COUNT: 13.2 % (ref 11.5–14.5)
GFR SERPL CREATININE-BSD FRML MDRD: 81 ML/MIN/1.73
GLOBULIN UR ELPH-MCNC: 3.2 GM/DL
GLUCOSE BLD-MCNC: 261 MG/DL (ref 74–98)
HCT VFR BLD AUTO: 42.3 % (ref 42–52)
HGB BLD-MCNC: 13.9 G/DL (ref 14–18)
IMM GRANULOCYTES # BLD: 0.03 10*3/MM3 (ref 0–0.06)
IMM GRANULOCYTES NFR BLD: 0.6 % (ref 0–0.6)
LYMPHOCYTES # BLD AUTO: 1.54 10*3/MM3 (ref 0.6–3.4)
LYMPHOCYTES NFR BLD AUTO: 30 % (ref 10–50)
MCH RBC QN AUTO: 30.3 PG (ref 27–31)
MCHC RBC AUTO-ENTMCNC: 32.9 G/DL (ref 30–37)
MCV RBC AUTO: 92.4 FL (ref 80–94)
MONOCYTES # BLD AUTO: 0.49 10*3/MM3 (ref 0–0.9)
MONOCYTES NFR BLD AUTO: 9.6 % (ref 0–12)
NEUTROPHILS # BLD AUTO: 2.77 10*3/MM3 (ref 2–6.9)
NEUTROPHILS NFR BLD AUTO: 53.9 % (ref 37–80)
NRBC BLD MANUAL-RTO: 0 /100 WBC (ref 0–0)
PLATELET # BLD AUTO: 160 10*3/MM3 (ref 130–400)
PMV BLD AUTO: 10.7 FL (ref 6–12)
POTASSIUM BLD-SCNC: 3.9 MMOL/L (ref 3.5–5.1)
PROT SERPL-MCNC: 8 G/DL (ref 6.3–8.2)
PSA SERPL-MCNC: 0.09 NG/ML (ref 0.06–4)
RBC # BLD AUTO: 4.58 10*6/MM3 (ref 4.7–6.1)
SODIUM BLD-SCNC: 145 MMOL/L (ref 137–145)
WBC NRBC COR # BLD: 5.13 10*3/MM3 (ref 4.8–10.8)

## 2018-02-12 PROCEDURE — 84153 ASSAY OF PSA TOTAL: CPT

## 2018-02-12 PROCEDURE — 80053 COMPREHEN METABOLIC PANEL: CPT

## 2018-02-12 PROCEDURE — 36415 COLL VENOUS BLD VENIPUNCTURE: CPT

## 2018-02-12 PROCEDURE — 85025 COMPLETE CBC W/AUTO DIFF WBC: CPT

## 2018-02-14 ENCOUNTER — OFFICE VISIT (OUTPATIENT)
Dept: ONCOLOGY | Facility: CLINIC | Age: 83
End: 2018-02-14

## 2018-02-14 VITALS
BODY MASS INDEX: 32.89 KG/M2 | RESPIRATION RATE: 17 BRPM | TEMPERATURE: 98.4 F | WEIGHT: 210 LBS | DIASTOLIC BLOOD PRESSURE: 64 MMHG | SYSTOLIC BLOOD PRESSURE: 140 MMHG | HEART RATE: 79 BPM

## 2018-02-14 DIAGNOSIS — C61 PROSTATE CANCER (HCC): Primary | ICD-10-CM

## 2018-02-14 PROCEDURE — 99214 OFFICE O/P EST MOD 30 MIN: CPT | Performed by: INTERNAL MEDICINE

## 2018-02-14 NOTE — PROGRESS NOTES
DATE OF VISIT: 2/14/2018     REASON FOR VISIT:   1. Prostate cancer. Presented with stage I, [id number], PSA at diagnosis 3 status  post prostatectomy in 2000.  2. Relapsed disease with rise in PSA gradually over the last couple of years,  most recent PSA 7.1 ng/mL on 01/25/2016.      HISTORY OF PRESENT ILLNESS: The patient is a very pleasant 80-year-old  gentleman with past medical history significant for prostate cancer status post  radical prostatectomy in 2000. The patient never received adjuvant treatments,  neither radiation, GnRH or antiandrogen treatment. The patient's PSA started  to go up gradually over the last 2 years. Patient was referred to me on  06/13/2014 and I did restaging workup that came back negative including bone  scan, CAT scans chest, abdomen and pelvis. We elected to proceed with watchful  waiting. Patient is here today in scheduled followup visit.     SUBJECTIVE: Mr. Dietz is here today for follow-up visit.  He continue to do very well with his treatment.  He has been having hot flashes.  He denied any weight loss.  No abdominal pain.     REVIEW OF SYSTEMS: All the other 9 systems are reviewed by me and negative  except what is mentioned in HPI and subjective.      PAST MEDICAL HISTORY/SOCIAL HISTORY/FAMILY HISTORY: Unchanged from my prior  documentation on 06/13/2014.       Current Outpatient Prescriptions:   •  allopurinol (ZYLOPRIM) 300 MG tablet, TAKE ONE TABLET BY MOUTH DAILY, Disp: 30 tablet, Rfl: 1  •  ascorbic acid (VITAMIN C) 1000 MG tablet, Take 1,000 mg by mouth Daily., Disp: , Rfl:   •  aspirin 81 MG EC tablet, Take 81 mg by mouth Daily., Disp: , Rfl:   •  B Complex Vitamins (VITAMIN B COMPLEX PO), Take  by mouth Daily., Disp: , Rfl:   •  CRISTINA CONTOUR TEST test strip, , Disp: , Rfl:   •  bicalutamide (CASODEX) 50 MG chemo tablet, Take 1 tablet by mouth Daily. For 10 days, Disp: 10 tablet, Rfl: 0  •  Cholecalciferol (VITAMIN D3) 2000 UNITS capsule, Take  by mouth 2 (two) times a  day., Disp: , Rfl:   •  Cinnamon 500 MG capsule, Take 2,000 mg by mouth Daily., Disp: , Rfl:   •  Coenzyme Q10 100 MG tablet, Take 2 tablets by mouth Daily., Disp: , Rfl:   •  fenofibric acid (TRILIPIX) 135 MG capsule delayed-release delayed release capsule, TAKE ONE CAPSULE BY MOUTH DAILY, Disp: 30 capsule, Rfl: 0  •  FLORASTOR 250 MG capsule, TAKE ONE CAPSULE BY MOUTH TWICE A DAY, Disp: 60 capsule, Rfl: 3  •  folic acid (FOLVITE) 400 MCG tablet, Take  by mouth 1 (one) time per week., Disp: , Rfl:   •  hydrochlorothiazide (HYDRODIURIL) 25 MG tablet, Take 25 mg by mouth Daily., Disp: , Rfl:   •  JANUVIA 100 MG tablet, TAKE ONE TABLET BY MOUTH DAILY, Disp: 30 tablet, Rfl: 1  •  JARDIANCE 10 MG tablet, , Disp: , Rfl:   •  l-methylfolate-algae-B6-B12 (METANX) 3-90.314-2-35 MG capsule capsule, Take  by mouth., Disp: , Rfl:   •  L-Methylfolate-B6-B12 3-35-2 MG tablet, 1 po bid, Disp: 180 tablet, Rfl: 1  •  Lecithin 1200 MG capsule, Take  by mouth Daily., Disp: , Rfl:   •  metFORMIN (GLUCOPHAGE) 500 MG tablet, Take 2 tablets by mouth 2 (Two) Times a Day With Meals., Disp: 120 tablet, Rfl: 2  •  metoprolol succinate XL (TOPROL-XL) 100 MG 24 hr tablet, TAKE 1 TABLET BY MOUTH ONE TIME A DAY , Disp: 30 tablet, Rfl: 2  •  Multiple Vitamins-Minerals (ICAPS AREDS 2 PO), Take  by mouth Daily., Disp: , Rfl:   •  pravastatin (PRAVACHOL) 40 MG tablet, TAKE ONE TABLET BY MOUTH EVERY NIGHT AT BEDTIME, Disp: 30 tablet, Rfl: 0  •  sildenafil (VIAGRA) 50 MG tablet, Take 1 tablet by mouth Daily As Needed for erectile dysfunction., Disp: 10 tablet, Rfl: 5  •  triamterene-hydrochlorothiazide (DYAZIDE) 37.5-25 MG per capsule, TAKE ONE CAPSULE BY MOUTH DAILY, Disp: 30 capsule, Rfl: 1  •  vitamin E 1000 UNIT capsule, Take 1,000 Units by mouth Daily., Disp: , Rfl:     PHYSICAL EXAMINATION:   /64  Pulse 79  Temp 98.4 °F (36.9 °C) (Temporal Artery )   Resp 17  Wt 95.3 kg (210 lb)  BMI 32.89 kg/m2  ECOG1  GENERAL: Age appropriate. No  acute distress.   HEENT: Head atraumatic, normocephalic.   NECK: Supple. No JVD. No lymphadenopathy.   LUNGS: Clear to auscultation bilaterally. No wheezing. No rhonchi.   HEART: Regular rate and rhythm. S1, S2, no murmurs.   ABDOMEN: Soft, nontender, nondistended. Bowel sounds positive. No  hepatosplenomegaly.   EXTREMITIES: No clubbing, cyanosis, or edema.   SKIN: No rashes. No purpura.   NEUROLOGIC: Awake and oriented x3. Strength 5 out of 5 in all muscle groups.     Lab on 02/12/2018   Component Date Value Ref Range Status   • PSA 02/12/2018 0.088  0.060 - 4.000 ng/mL Final   • Glucose 02/12/2018 261* 74 - 98 mg/dL Final    Glucose >180, Hemoglobin A1C recommended.   • BUN 02/12/2018 16  7 - 20 mg/dL Final   • Creatinine 02/12/2018 0.90  0.60 - 1.30 mg/dL Final   • Sodium 02/12/2018 145  137 - 145 mmol/L Final   • Potassium 02/12/2018 3.9  3.5 - 5.1 mmol/L Final   • Chloride 02/12/2018 103  98 - 107 mmol/L Final   • CO2 02/12/2018 27.0  26.0 - 30.0 mmol/L Final   • Calcium 02/12/2018 10.1  8.4 - 10.2 mg/dL Final   • Total Protein 02/12/2018 8.0  6.3 - 8.2 g/dL Final   • Albumin 02/12/2018 4.80  3.50 - 5.00 g/dL Final   • ALT (SGPT) 02/12/2018 79* 13 - 69 U/L Final   • AST (SGOT) 02/12/2018 79* 15 - 46 U/L Final   • Alkaline Phosphatase 02/12/2018 53  38 - 126 U/L Final   • Total Bilirubin 02/12/2018 0.8  0.2 - 1.3 mg/dL Final   • eGFR Non African Amer 02/12/2018 81  >60 mL/min/1.73 Final   • Globulin 02/12/2018 3.2  gm/dL Final   • A/G Ratio 02/12/2018 1.5  1.0 - 2.0 g/dL Final   • BUN/Creatinine Ratio 02/12/2018 17.8  6.3 - 21.9 Final   • Anion Gap 02/12/2018 18.9  mmol/L Final   • WBC 02/12/2018 5.13  4.80 - 10.80 10*3/mm3 Final   • RBC 02/12/2018 4.58* 4.70 - 6.10 10*6/mm3 Final   • Hemoglobin 02/12/2018 13.9* 14.0 - 18.0 g/dL Final   • Hematocrit 02/12/2018 42.3  42.0 - 52.0 % Final   • MCV 02/12/2018 92.4  80.0 - 94.0 fL Final   • MCH 02/12/2018 30.3  27.0 - 31.0 pg Final   • MCHC 02/12/2018 32.9  30.0 -  37.0 g/dL Final   • RDW 02/12/2018 13.2  11.5 - 14.5 % Final   • RDW-SD 02/12/2018 44.8  37.0 - 54.0 fl Final   • MPV 02/12/2018 10.7  6.0 - 12.0 fL Final   • Platelets 02/12/2018 160  130 - 400 10*3/mm3 Final   • Neutrophil % 02/12/2018 53.9  37.0 - 80.0 % Final   • Lymphocyte % 02/12/2018 30.0  10.0 - 50.0 % Final   • Monocyte % 02/12/2018 9.6  0.0 - 12.0 % Final   • Eosinophil % 02/12/2018 5.3  0.0 - 7.0 % Final   • Basophil % 02/12/2018 0.6  0.0 - 2.5 % Final   • Immature Grans % 02/12/2018 0.6  0.0 - 0.6 % Final   • Neutrophils, Absolute 02/12/2018 2.77  2.00 - 6.90 10*3/mm3 Final   • Lymphocytes, Absolute 02/12/2018 1.54  0.60 - 3.40 10*3/mm3 Final   • Monocytes, Absolute 02/12/2018 0.49  0.00 - 0.90 10*3/mm3 Final   • Eosinophils, Absolute 02/12/2018 0.27  0.00 - 0.70 10*3/mm3 Final   • Basophils, Absolute 02/12/2018 0.03  0.00 - 0.20 10*3/mm3 Final   • Immature Grans, Absolute 02/12/2018 0.03  0.00 - 0.06 10*3/mm3 Final   • nRBC 02/12/2018 0.0  0.0 - 0.0 /100 WBC Final        ASSESSMENT: The patient is a very pleasant 80-year-old gentleman with relapsed  prostate cancer.     PROBLEM LIST:  1. Prostate cancer, initially presented as [id number] status post radical  prostatectomy 2000.  2. Rise in PSA gradually over the last 2 years,   3. Multiple comorbidities including type 2 diabetes, hypertension, morbid  obesity, osteoarthritis, and hypercholesterolemia.  4. Erectile dysfunction.   5.  Family history of colon cancer     PLAN:  1. I did go over the results in detail with the patient. I explained to him that  his his PSA is down from 0.2 in November 2017 to 0.08 February 2018.   I will continue Eligard 22.5mg q3 month.   2. The patient will come back to see me in 3 months with repeat blood work as  well as serum PSA.   3. I will continue Viagra as needed for erectile dysfunction.   4.  We'll continue Januvia as well as metformin for type 2 diabetes  5.  We'll continue pravastatin for hypercholesterolemia  6.   Patient need to have 5 year follow-up study which would be due 11/2021.  7. We reviewed the potential side effects of the treatment including hot flashes, impotence, joint pain, decrease in bone density, mood or sleep disturbance, and asthenia.    Shannan Ramon MD  2/14/2018

## 2018-02-19 RX ORDER — FENOFIBRIC ACID 135 MG/1
CAPSULE, DELAYED RELEASE ORAL
Qty: 30 CAPSULE | Refills: 0 | Status: SHIPPED | OUTPATIENT
Start: 2018-02-19 | End: 2018-03-19 | Stop reason: SDUPTHER

## 2018-03-04 RX ORDER — ALLOPURINOL 300 MG/1
TABLET ORAL
Qty: 30 TABLET | Refills: 0 | Status: SHIPPED | OUTPATIENT
Start: 2018-03-04 | End: 2018-04-01 | Stop reason: SDUPTHER

## 2018-03-04 RX ORDER — TRIAMTERENE AND HYDROCHLOROTHIAZIDE 37.5; 25 MG/1; MG/1
CAPSULE ORAL
Qty: 30 CAPSULE | Refills: 0 | Status: SHIPPED | OUTPATIENT
Start: 2018-03-04 | End: 2018-04-01 | Stop reason: SDUPTHER

## 2018-03-04 RX ORDER — SITAGLIPTIN 100 MG/1
TABLET, FILM COATED ORAL
Qty: 30 TABLET | Refills: 0 | Status: SHIPPED | OUTPATIENT
Start: 2018-03-04 | End: 2018-04-01 | Stop reason: SDUPTHER

## 2018-03-04 RX ORDER — PRAVASTATIN SODIUM 40 MG
TABLET ORAL
Qty: 30 TABLET | Refills: 0 | Status: SHIPPED | OUTPATIENT
Start: 2018-03-04 | End: 2018-04-09 | Stop reason: SDUPTHER

## 2018-03-06 ENCOUNTER — LAB (OUTPATIENT)
Dept: LAB | Facility: HOSPITAL | Age: 83
End: 2018-03-06

## 2018-03-06 DIAGNOSIS — C61 PROSTATE CANCER (HCC): ICD-10-CM

## 2018-03-06 LAB
ALBUMIN SERPL-MCNC: 4.4 G/DL (ref 3.5–5)
ALBUMIN/GLOB SERPL: 1.6 G/DL (ref 1–2)
ALP SERPL-CCNC: 47 U/L (ref 38–126)
ALT SERPL W P-5'-P-CCNC: 59 U/L (ref 13–69)
ANION GAP SERPL CALCULATED.3IONS-SCNC: 19 MMOL/L
AST SERPL-CCNC: 57 U/L (ref 15–46)
BASOPHILS # BLD AUTO: 0.04 10*3/MM3 (ref 0–0.2)
BASOPHILS NFR BLD AUTO: 0.7 % (ref 0–2.5)
BILIRUB SERPL-MCNC: 0.5 MG/DL (ref 0.2–1.3)
BUN BLD-MCNC: 22 MG/DL (ref 7–20)
BUN/CREAT SERPL: 24.4 (ref 6.3–21.9)
CALCIUM SPEC-SCNC: 9.7 MG/DL (ref 8.4–10.2)
CHLORIDE SERPL-SCNC: 102 MMOL/L (ref 98–107)
CO2 SERPL-SCNC: 28 MMOL/L (ref 26–30)
CREAT BLD-MCNC: 0.9 MG/DL (ref 0.6–1.3)
DEPRECATED RDW RBC AUTO: 43.8 FL (ref 37–54)
EOSINOPHIL # BLD AUTO: 0.33 10*3/MM3 (ref 0–0.7)
EOSINOPHIL NFR BLD AUTO: 5.8 % (ref 0–7)
ERYTHROCYTE [DISTWIDTH] IN BLOOD BY AUTOMATED COUNT: 13.2 % (ref 11.5–14.5)
GFR SERPL CREATININE-BSD FRML MDRD: 81 ML/MIN/1.73
GLOBULIN UR ELPH-MCNC: 2.8 GM/DL
GLUCOSE BLD-MCNC: 221 MG/DL (ref 74–98)
HCT VFR BLD AUTO: 42.4 % (ref 42–52)
HGB BLD-MCNC: 14 G/DL (ref 14–18)
IMM GRANULOCYTES # BLD: 0.03 10*3/MM3 (ref 0–0.06)
IMM GRANULOCYTES NFR BLD: 0.5 % (ref 0–0.6)
LYMPHOCYTES # BLD AUTO: 1.75 10*3/MM3 (ref 0.6–3.4)
LYMPHOCYTES NFR BLD AUTO: 31 % (ref 10–50)
MCH RBC QN AUTO: 30.2 PG (ref 27–31)
MCHC RBC AUTO-ENTMCNC: 33 G/DL (ref 30–37)
MCV RBC AUTO: 91.6 FL (ref 80–94)
MONOCYTES # BLD AUTO: 0.56 10*3/MM3 (ref 0–0.9)
MONOCYTES NFR BLD AUTO: 9.9 % (ref 0–12)
NEUTROPHILS # BLD AUTO: 2.94 10*3/MM3 (ref 2–6.9)
NEUTROPHILS NFR BLD AUTO: 52.1 % (ref 37–80)
NRBC BLD MANUAL-RTO: 0 /100 WBC (ref 0–0)
PLATELET # BLD AUTO: 177 10*3/MM3 (ref 130–400)
PMV BLD AUTO: 10.2 FL (ref 6–12)
POTASSIUM BLD-SCNC: 4 MMOL/L (ref 3.5–5.1)
PROT SERPL-MCNC: 7.2 G/DL (ref 6.3–8.2)
PSA SERPL-MCNC: 0.07 NG/ML (ref 0.06–4)
RBC # BLD AUTO: 4.63 10*6/MM3 (ref 4.7–6.1)
SODIUM BLD-SCNC: 145 MMOL/L (ref 137–145)
WBC NRBC COR # BLD: 5.65 10*3/MM3 (ref 4.8–10.8)

## 2018-03-06 PROCEDURE — 36415 COLL VENOUS BLD VENIPUNCTURE: CPT

## 2018-03-06 PROCEDURE — 84153 ASSAY OF PSA TOTAL: CPT

## 2018-03-06 PROCEDURE — 85025 COMPLETE CBC W/AUTO DIFF WBC: CPT

## 2018-03-06 PROCEDURE — 80053 COMPREHEN METABOLIC PANEL: CPT

## 2018-03-07 ENCOUNTER — INFUSION (OUTPATIENT)
Dept: ONCOLOGY | Facility: HOSPITAL | Age: 83
End: 2018-03-07

## 2018-03-07 VITALS
HEART RATE: 80 BPM | DIASTOLIC BLOOD PRESSURE: 57 MMHG | SYSTOLIC BLOOD PRESSURE: 115 MMHG | TEMPERATURE: 97.4 F | RESPIRATION RATE: 16 BRPM

## 2018-03-07 DIAGNOSIS — C61 PROSTATE CANCER (HCC): Primary | ICD-10-CM

## 2018-03-07 PROCEDURE — 96402 CHEMO HORMON ANTINEOPL SQ/IM: CPT

## 2018-03-07 PROCEDURE — 25010000002 LEUPROLIDE 22.5 MG KIT: Performed by: INTERNAL MEDICINE

## 2018-03-07 RX ADMIN — LEUPROLIDE ACETATE 22.5 MG: KIT SUBCUTANEOUS at 14:22

## 2018-03-16 RX ORDER — L-METHYLFOLATE-ALGAE-VIT B12-B6 CAP 3-90.314-2-35 MG 3-90.314-2-35 MG
CAP ORAL
Qty: 180 CAPSULE | Refills: 0 | Status: SHIPPED | OUTPATIENT
Start: 2018-03-16 | End: 2018-04-16 | Stop reason: SDUPTHER

## 2018-03-19 RX ORDER — FENOFIBRIC ACID 135 MG/1
CAPSULE, DELAYED RELEASE ORAL
Qty: 30 CAPSULE | Refills: 0 | Status: SHIPPED | OUTPATIENT
Start: 2018-03-19 | End: 2018-04-26 | Stop reason: SDUPTHER

## 2018-03-27 RX ORDER — METOPROLOL SUCCINATE 100 MG/1
TABLET, EXTENDED RELEASE ORAL
Qty: 30 TABLET | Refills: 0 | Status: SHIPPED | OUTPATIENT
Start: 2018-03-27 | End: 2018-04-29 | Stop reason: SDUPTHER

## 2018-03-30 ENCOUNTER — OFFICE VISIT (OUTPATIENT)
Dept: INTERNAL MEDICINE | Facility: CLINIC | Age: 83
End: 2018-03-30

## 2018-03-30 VITALS
BODY MASS INDEX: 33.13 KG/M2 | WEIGHT: 211.5 LBS | HEART RATE: 77 BPM | DIASTOLIC BLOOD PRESSURE: 80 MMHG | SYSTOLIC BLOOD PRESSURE: 110 MMHG | OXYGEN SATURATION: 97 %

## 2018-03-30 DIAGNOSIS — E78.00 HYPERCHOLESTEREMIA: ICD-10-CM

## 2018-03-30 DIAGNOSIS — C61 PROSTATE CANCER (HCC): ICD-10-CM

## 2018-03-30 DIAGNOSIS — I10 ESSENTIAL HYPERTENSION: Primary | ICD-10-CM

## 2018-03-30 DIAGNOSIS — R35.0 URINARY FREQUENCY: ICD-10-CM

## 2018-03-30 PROCEDURE — 99214 OFFICE O/P EST MOD 30 MIN: CPT | Performed by: INTERNAL MEDICINE

## 2018-03-30 NOTE — PROGRESS NOTES
Subjective   Blane Dietz is a 82 y.o. male.   Chief Complaint   Patient presents with   • Hypertension   • Osteoarthritis       Hypertension   This is a chronic problem. The current episode started more than 1 year ago. Pertinent negatives include no chest pain, headaches, neck pain, palpitations or shortness of breath.   Hyperlipidemia   This is a chronic problem. The current episode started more than 1 year ago. Pertinent negatives include no chest pain, myalgias or shortness of breath.   Osteoarthritis   Pertinent negatives include no abdominal pain, arthralgias, chest pain, chills, congestion, coughing, diaphoresis, fatigue, fever, headaches, myalgias, nausea, neck pain, numbness, rash, sore throat or vomiting.      Gets up every 2 hours to go bathroom.  Has a rash,rectal that he is seeing Colorectal surgeons Monday.  Prostate cancer and is following with onc.  6 m hx urinary freq.  Mainly at night  The following portions of the patient's history were reviewed and updated as appropriate: allergies, current medications, past family history, past medical history, past social history, past surgical history and problem list.    Review of Systems   Constitutional: Negative for activity change, appetite change, chills, diaphoresis, fatigue, fever and unexpected weight change.   HENT: Negative for congestion, ear discharge, ear pain, mouth sores, nosebleeds, sinus pressure, sneezing and sore throat.    Eyes: Negative for pain, discharge and itching.   Respiratory: Negative for cough, chest tightness, shortness of breath and wheezing.    Cardiovascular: Negative for chest pain, palpitations and leg swelling.   Gastrointestinal: Negative for abdominal pain, constipation, diarrhea, nausea and vomiting.   Endocrine: Negative for cold intolerance, heat intolerance, polydipsia and polyphagia.   Genitourinary: Positive for frequency. Negative for dysuria, flank pain, hematuria and urgency.   Musculoskeletal: Negative for  arthralgias, back pain, gait problem, myalgias, neck pain and neck stiffness.   Skin: Negative for color change, pallor and rash.   Neurological: Negative for seizures, speech difficulty, numbness and headaches.   Psychiatric/Behavioral: Negative for agitation, confusion, decreased concentration and sleep disturbance. The patient is not nervous/anxious.      /80   Pulse 77   Wt 95.9 kg (211 lb 8 oz)   SpO2 97%   BMI 33.13 kg/m²     Objective   Physical Exam   Constitutional: He appears well-developed.   HENT:   Head: Normocephalic.   Right Ear: External ear normal.   Left Ear: External ear normal.   Nose: Nose normal.   Mouth/Throat: Oropharynx is clear and moist.   Eyes: Conjunctivae are normal. Pupils are equal, round, and reactive to light.   Neck: No JVD present. No thyromegaly present.   Cardiovascular: Normal rate, regular rhythm and normal heart sounds.  Exam reveals no friction rub.    No murmur heard.  Pulmonary/Chest: Effort normal and breath sounds normal. No respiratory distress. He has no wheezes. He has no rales.   Abdominal: Soft. Bowel sounds are normal. He exhibits no distension. There is no tenderness. There is no guarding.   Musculoskeletal: He exhibits no edema or tenderness.   Lymphadenopathy:     He has no cervical adenopathy.   Neurological: He displays normal reflexes. No cranial nerve deficit.   Skin: No rash noted.   Psychiatric: His behavior is normal.   Nursing note and vitals reviewed.      Assessment/Plan   Blane was seen today for hypertension and osteoarthritis.    Diagnoses and all orders for this visit:    Essential hypertension  stable  Hypercholesteremia  Urinary freq  Start myrbetriq 5 mg po qd. Follow BP closely while on this. He has cuff at home  stable  Prostate cancer  Followed by onc. PSA trending back down.   DM  Follows with Endo regarding his blood sugars.   50% of visit spent counseling

## 2018-04-02 RX ORDER — SITAGLIPTIN 100 MG/1
TABLET, FILM COATED ORAL
Qty: 30 TABLET | Refills: 0 | Status: SHIPPED | OUTPATIENT
Start: 2018-04-02 | End: 2018-05-08 | Stop reason: SDUPTHER

## 2018-04-02 RX ORDER — TRIAMTERENE AND HYDROCHLOROTHIAZIDE 37.5; 25 MG/1; MG/1
CAPSULE ORAL
Qty: 30 CAPSULE | Refills: 0 | Status: SHIPPED | OUTPATIENT
Start: 2018-04-02 | End: 2018-05-08 | Stop reason: SDUPTHER

## 2018-04-02 RX ORDER — ALLOPURINOL 300 MG/1
TABLET ORAL
Qty: 30 TABLET | Refills: 0 | Status: SHIPPED | OUTPATIENT
Start: 2018-04-02 | End: 2018-05-08 | Stop reason: SDUPTHER

## 2018-04-09 RX ORDER — PRAVASTATIN SODIUM 40 MG
TABLET ORAL
Qty: 30 TABLET | Refills: 0 | Status: SHIPPED | OUTPATIENT
Start: 2018-04-09 | End: 2018-05-08 | Stop reason: SDUPTHER

## 2018-04-16 RX ORDER — L-METHYLFOLATE-ALGAE-VIT B12-B6 CAP 3-90.314-2-35 MG 3-90.314-2-35 MG
1 CAP ORAL 2 TIMES DAILY
Qty: 180 CAPSULE | Refills: 0 | Status: SHIPPED | OUTPATIENT
Start: 2018-04-16 | End: 2018-08-14 | Stop reason: SDUPTHER

## 2018-04-26 RX ORDER — FENOFIBRIC ACID 135 MG/1
CAPSULE, DELAYED RELEASE ORAL
Qty: 30 CAPSULE | Refills: 0 | Status: SHIPPED | OUTPATIENT
Start: 2018-04-26 | End: 2018-05-21 | Stop reason: SDUPTHER

## 2018-04-29 RX ORDER — METOPROLOL SUCCINATE 100 MG/1
TABLET, EXTENDED RELEASE ORAL
Qty: 30 TABLET | Refills: 0 | Status: SHIPPED | OUTPATIENT
Start: 2018-04-29 | End: 2018-06-04 | Stop reason: SDUPTHER

## 2018-05-07 ENCOUNTER — OFFICE VISIT (OUTPATIENT)
Dept: CARDIOLOGY | Facility: CLINIC | Age: 83
End: 2018-05-07

## 2018-05-07 VITALS
OXYGEN SATURATION: 97 % | RESPIRATION RATE: 18 BRPM | DIASTOLIC BLOOD PRESSURE: 62 MMHG | HEART RATE: 78 BPM | HEIGHT: 67 IN | BODY MASS INDEX: 32.55 KG/M2 | WEIGHT: 207.4 LBS | SYSTOLIC BLOOD PRESSURE: 106 MMHG

## 2018-05-07 DIAGNOSIS — I10 ESSENTIAL HYPERTENSION: ICD-10-CM

## 2018-05-07 DIAGNOSIS — I35.0 AORTIC VALVE STENOSIS, ETIOLOGY OF CARDIAC VALVE DISEASE UNSPECIFIED: Primary | ICD-10-CM

## 2018-05-07 PROCEDURE — 99213 OFFICE O/P EST LOW 20 MIN: CPT | Performed by: INTERNAL MEDICINE

## 2018-05-07 RX ORDER — CHLORAL HYDRATE 500 MG
1000 CAPSULE ORAL AS NEEDED
COMMUNITY
End: 2019-07-12

## 2018-05-07 NOTE — PROGRESS NOTES
Subjective:     Encounter Date:05/07/2018      Patient ID: Blane Dietz is a 82 y.o. male.    Chief Complaint:Hypertension  HPI  This is an 82-year-old male patient who is 20 years out from a Ross procedure for valvular aortic stenosis by Dr. Lyles.  The patient has no chest discomfort at rest or with activity.  He has no shortness of breath at rest or with activity.  He has no orthopnea PND or lower extremity edema.  There is no dizziness palpitations or syncope.  He reports compliance with his medications.  The patient is due to undergo a colonoscopy with possible biopsy.  He requests antibiotic prophylaxis.  He is allergic to penicillin.  The following portions of the patient's history were reviewed and updated as appropriate: allergies, current medications, past family history, past medical history, past social history, past surgical history and problem  Review of Systems   Constitution: Negative for chills, diaphoresis, fever, weakness, malaise/fatigue, night sweats, weight gain and weight loss.   HENT: Negative for ear discharge, hearing loss, hoarse voice and nosebleeds.    Eyes: Negative for discharge, double vision, pain and photophobia.   Cardiovascular: Negative for chest pain, claudication, cyanosis, dyspnea on exertion, irregular heartbeat, leg swelling, near-syncope, orthopnea, palpitations, paroxysmal nocturnal dyspnea and syncope.   Respiratory: Negative for cough, hemoptysis, shortness of breath, sputum production and wheezing.    Endocrine: Negative for cold intolerance, heat intolerance, polydipsia, polyphagia and polyuria.   Hematologic/Lymphatic: Negative for adenopathy and bleeding problem. Does not bruise/bleed easily.   Skin: Negative for color change, flushing, itching and rash.   Musculoskeletal: Negative for muscle cramps, muscle weakness, myalgias and stiffness.   Gastrointestinal: Negative for abdominal pain, diarrhea, hematemesis, hematochezia, nausea and vomiting.    Genitourinary: Negative for dysuria, frequency and nocturia.   Neurological: Negative for focal weakness, loss of balance, numbness, paresthesias and seizures.   Psychiatric/Behavioral: Negative for altered mental status, hallucinations and suicidal ideas.   Allergic/Immunologic: Negative for HIV exposure, hives and persistent infections.           Current Outpatient Prescriptions:   •  allopurinol (ZYLOPRIM) 300 MG tablet, TAKE ONE TABLET BY MOUTH DAILY, Disp: 30 tablet, Rfl: 0  •  ascorbic acid (VITAMIN C) 1000 MG tablet, Take 1,000 mg by mouth Daily., Disp: , Rfl:   •  aspirin 81 MG EC tablet, Take 81 mg by mouth Daily., Disp: , Rfl:   •  B Complex Vitamins (VITAMIN B COMPLEX PO), Take  by mouth Daily., Disp: , Rfl:   •  CRISTINA CONTOUR TEST test strip, , Disp: , Rfl:   •  Cholecalciferol (VITAMIN D3) 2000 UNITS capsule, Take  by mouth 2 (two) times a day., Disp: , Rfl:   •  Cinnamon 500 MG capsule, Take 2,000 mg by mouth Daily., Disp: , Rfl:   •  Coenzyme Q10 100 MG tablet, Take 2 tablets by mouth Daily., Disp: , Rfl:   •  fenofibric acid (TRILIPIX) 135 MG capsule delayed-release delayed release capsule, TAKE ONE CAPSULE BY MOUTH DAILY, Disp: 30 capsule, Rfl: 0  •  FLORASTOR 250 MG capsule, TAKE ONE CAPSULE BY MOUTH TWICE A DAY, Disp: 60 capsule, Rfl: 3  •  folic acid (FOLVITE) 400 MCG tablet, Take  by mouth 1 (one) time per week., Disp: , Rfl:   •  hydrochlorothiazide (HYDRODIURIL) 25 MG tablet, Take 25 mg by mouth Daily., Disp: , Rfl:   •  JANUVIA 100 MG tablet, TAKE ONE TABLET BY MOUTH DAILY, Disp: 30 tablet, Rfl: 0  •  JARDIANCE 10 MG tablet, 10 mg Daily., Disp: , Rfl:   •  l-methylfolate-algae-B6-B12 (METANX) 3-90.314-2-35 MG capsule capsule, Take 1 capsule by mouth 2 (Two) Times a Day., Disp: 180 capsule, Rfl: 0  •  Lecithin 1200 MG capsule, Take  by mouth Daily., Disp: , Rfl:   •  metFORMIN (GLUCOPHAGE) 500 MG tablet, Take 2 tablets by mouth 2 (Two) Times a Day With Meals., Disp: 120 tablet, Rfl: 2  •   metoprolol succinate XL (TOPROL-XL) 100 MG 24 hr tablet, TAKE 1 TABLET BY MOUTH ONE TIME A DAY , Disp: 30 tablet, Rfl: 0  •  Multiple Vitamins-Minerals (ICAPS AREDS 2 PO), Take  by mouth Daily., Disp: , Rfl:   •  Omega-3 Fatty Acids (FISH OIL) 1000 MG capsule capsule, Take 1,000 mg by mouth As Needed., Disp: , Rfl:   •  Omega-3 Fatty Acids (OMEGA-3 CF PO), Take  by mouth Daily., Disp: , Rfl:   •  pravastatin (PRAVACHOL) 40 MG tablet, TAKE ONE TABLET BY MOUTH EVERY NIGHT AT BEDTIME, Disp: 30 tablet, Rfl: 0  •  triamterene-hydrochlorothiazide (DYAZIDE) 37.5-25 MG per capsule, TAKE ONE CAPSULE BY MOUTH DAILY, Disp: 30 capsule, Rfl: 0  •  vitamin E 1000 UNIT capsule, Take 1,000 Units by mouth Daily., Disp: , Rfl:      Objective:     Physical Exam   Constitutional: He is oriented to person, place, and time. He appears well-developed and well-nourished.   HENT:   Head: Normocephalic and atraumatic.   Mouth/Throat: Oropharynx is clear and moist.   Eyes: Conjunctivae and EOM are normal. Pupils are equal, round, and reactive to light. No scleral icterus.   Neck: Normal range of motion. Neck supple. No JVD present. No tracheal deviation present. No thyromegaly present.   Cardiovascular: Normal rate, regular rhythm, S1 normal, S2 normal, normal heart sounds, intact distal pulses and normal pulses.  PMI is not displaced.  Exam reveals no gallop and no friction rub.    No murmur heard.  Pulmonary/Chest: Effort normal and breath sounds normal. No respiratory distress. He has no wheezes. He has no rales.   Abdominal: Soft. Bowel sounds are normal. He exhibits no distension and no mass. There is no tenderness. There is no rebound and no guarding.   Musculoskeletal: Normal range of motion. He exhibits no edema or deformity.   Neurological: He is alert and oriented to person, place, and time. He displays normal reflexes. No cranial nerve deficit. Coordination normal.   Skin: Skin is warm and dry. No rash noted. No erythema.  "  Psychiatric: He has a normal mood and affect. His behavior is normal. Thought content normal.     Blood pressure 106/62, pulse 78, resp. rate 18, height 168.9 cm (66.5\"), weight 94.1 kg (207 lb 6.4 oz), SpO2 97 %.   Lab Review:       Assessment:         1. Aortic valve stenosis, etiology of cardiac valve disease unspecified  The patient has undergone a Ross procedure approximately 20 years ago.  His last echocardiogram was 4 years ago.  He reports being told by his primary care provider that he has a heart murmur.  - Adult Transthoracic Echo Complete W/ Cont if Necessary Per Protocol    2. Essential hypertension  Excellent blood pressure control.    Procedures     Plan:       I have recommended an echocardiogram.  I have given the patient a prescription for clindamycin to use prior to his colonoscopy.  No changes in his medication therapy have been made at today's visit.  Further recommendations will be predicated on the results of his outpatient testing.         "

## 2018-05-08 RX ORDER — SITAGLIPTIN 100 MG/1
TABLET, FILM COATED ORAL
Qty: 30 TABLET | Refills: 5 | Status: SHIPPED | OUTPATIENT
Start: 2018-05-08 | End: 2018-12-03 | Stop reason: SDUPTHER

## 2018-05-08 RX ORDER — PRAVASTATIN SODIUM 40 MG
TABLET ORAL
Qty: 30 TABLET | Refills: 5 | Status: SHIPPED | OUTPATIENT
Start: 2018-05-08 | End: 2018-11-10 | Stop reason: SDUPTHER

## 2018-05-08 RX ORDER — TRIAMTERENE AND HYDROCHLOROTHIAZIDE 37.5; 25 MG/1; MG/1
CAPSULE ORAL
Qty: 30 CAPSULE | Refills: 5 | Status: SHIPPED | OUTPATIENT
Start: 2018-05-08 | End: 2018-12-03 | Stop reason: SDUPTHER

## 2018-05-08 RX ORDER — ALLOPURINOL 300 MG/1
TABLET ORAL
Qty: 30 TABLET | Refills: 5 | Status: SHIPPED | OUTPATIENT
Start: 2018-05-08 | End: 2018-11-25 | Stop reason: SDUPTHER

## 2018-05-16 LAB
BH CV ECHO MEAS - % IVS THICK: 55.9 %
BH CV ECHO MEAS - % LVPW THICK: 44.4 %
BH CV ECHO MEAS - AO MAX PG (FULL): 1.3 MMHG
BH CV ECHO MEAS - AO MAX PG: 5 MMHG
BH CV ECHO MEAS - AO MEAN PG (FULL): 0 MMHG
BH CV ECHO MEAS - AO MEAN PG: 2 MMHG
BH CV ECHO MEAS - AO ROOT AREA: 10.2 CM^2
BH CV ECHO MEAS - AO ROOT DIAM: 3.6 CM
BH CV ECHO MEAS - AO V2 MAX: 112 CM/SEC
BH CV ECHO MEAS - AO V2 MEAN: 73.9 CM/SEC
BH CV ECHO MEAS - AO V2 VTI: 17.4 CM
BH CV ECHO MEAS - AVA(I,A): 3.6 CM^2
BH CV ECHO MEAS - AVA(I,D): 3.6 CM^2
BH CV ECHO MEAS - AVA(V,A): 3 CM^2
BH CV ECHO MEAS - AVA(V,D): 3 CM^2
BH CV ECHO MEAS - CONTRAST EF 4CH: 57.6 ML/M^2
BH CV ECHO MEAS - EDV(CUBED): 190.1 ML
BH CV ECHO MEAS - EDV(MOD-SP4): 139 ML
BH CV ECHO MEAS - EDV(TEICH): 163.3 ML
BH CV ECHO MEAS - EF(CUBED): 66.3 %
BH CV ECHO MEAS - EF(MOD-SP4): 57.6 %
BH CV ECHO MEAS - EF(TEICH): 57.1 %
BH CV ECHO MEAS - ESV(CUBED): 64 ML
BH CV ECHO MEAS - ESV(MOD-SP4): 59 ML
BH CV ECHO MEAS - ESV(TEICH): 70 ML
BH CV ECHO MEAS - FS: 30.4 %
BH CV ECHO MEAS - IVS/LVPW: 1.9
BH CV ECHO MEAS - IVSD: 1.5 CM
BH CV ECHO MEAS - IVSS: 2.7 CM
BH CV ECHO MEAS - LA DIMENSION: 5.2 CM
BH CV ECHO MEAS - LA/AO: 1.4
BH CV ECHO MEAS - LV MASS(C)D: 326.8 GRAMS
BH CV ECHO MEAS - LV MASS(C)S: 365.4 GRAMS
BH CV ECHO MEAS - LV MAX PG: 3.7 MMHG
BH CV ECHO MEAS - LV MEAN PG: 2 MMHG
BH CV ECHO MEAS - LV V1 MAX: 96.1 CM/SEC
BH CV ECHO MEAS - LV V1 MEAN: 57.9 CM/SEC
BH CV ECHO MEAS - LV V1 VTI: 18.3 CM
BH CV ECHO MEAS - LVIDD: 5.8 CM
BH CV ECHO MEAS - LVIDS: 4 CM
BH CV ECHO MEAS - LVLD AP4: 8.8 CM
BH CV ECHO MEAS - LVLS AP4: 8.1 CM
BH CV ECHO MEAS - LVOT AREA (M): 3.5 CM^2
BH CV ECHO MEAS - LVOT AREA: 3.5 CM^2
BH CV ECHO MEAS - LVOT DIAM: 2.1 CM
BH CV ECHO MEAS - LVPWD: 1.3 CM
BH CV ECHO MEAS - LVPWS: 1.3 CM
BH CV ECHO MEAS - MV A MAX VEL: 88.7 CM/SEC
BH CV ECHO MEAS - MV DEC SLOPE: 281 CM/SEC^2
BH CV ECHO MEAS - MV DEC TIME: 0.22 SEC
BH CV ECHO MEAS - MV E MAX VEL: 65.3 CM/SEC
BH CV ECHO MEAS - MV E/A: 0.74
BH CV ECHO MEAS - MV P1/2T MAX VEL: 63.9 CM/SEC
BH CV ECHO MEAS - MV P1/2T: 66.6 MSEC
BH CV ECHO MEAS - MVA P1/2T LCG: 3.4 CM^2
BH CV ECHO MEAS - MVA(P1/2T): 3.3 CM^2
BH CV ECHO MEAS - PA MAX PG: 0.77 MMHG
BH CV ECHO MEAS - PA V2 MAX: 44 CM/SEC
BH CV ECHO MEAS - RAP SYSTOLE: 10 MMHG
BH CV ECHO MEAS - RVSP: 28 MMHG
BH CV ECHO MEAS - SV(AO): 177.1 ML
BH CV ECHO MEAS - SV(CUBED): 126.1 ML
BH CV ECHO MEAS - SV(LVOT): 63.4 ML
BH CV ECHO MEAS - SV(MOD-SP4): 80 ML
BH CV ECHO MEAS - SV(TEICH): 93.3 ML
BH CV ECHO MEAS - TR MAX VEL: 210 CM/SEC
BH CV ECHO MEAS - TV MAX PG: 2 MMHG
BH CV ECHO MEAS - TV V2 MAX: 70.1 CM/SEC
LV EF 2D ECHO EST: 58 %

## 2018-05-22 RX ORDER — FENOFIBRIC ACID 135 MG/1
CAPSULE, DELAYED RELEASE ORAL
Qty: 30 CAPSULE | Refills: 0 | Status: SHIPPED | OUTPATIENT
Start: 2018-05-22 | End: 2018-06-24 | Stop reason: SDUPTHER

## 2018-06-04 RX ORDER — METOPROLOL SUCCINATE 100 MG/1
TABLET, EXTENDED RELEASE ORAL
Qty: 30 TABLET | Refills: 0 | Status: SHIPPED | OUTPATIENT
Start: 2018-06-04 | End: 2018-07-01 | Stop reason: SDUPTHER

## 2018-06-06 ENCOUNTER — OFFICE VISIT (OUTPATIENT)
Dept: ONCOLOGY | Facility: CLINIC | Age: 83
End: 2018-06-06

## 2018-06-06 ENCOUNTER — INFUSION (OUTPATIENT)
Dept: ONCOLOGY | Facility: HOSPITAL | Age: 83
End: 2018-06-06

## 2018-06-06 ENCOUNTER — APPOINTMENT (OUTPATIENT)
Dept: LAB | Facility: HOSPITAL | Age: 83
End: 2018-06-06

## 2018-06-06 ENCOUNTER — LAB (OUTPATIENT)
Dept: LAB | Facility: HOSPITAL | Age: 83
End: 2018-06-06

## 2018-06-06 VITALS
HEIGHT: 66 IN | RESPIRATION RATE: 17 BRPM | WEIGHT: 207 LBS | DIASTOLIC BLOOD PRESSURE: 73 MMHG | SYSTOLIC BLOOD PRESSURE: 163 MMHG | HEART RATE: 85 BPM | TEMPERATURE: 98.3 F | BODY MASS INDEX: 33.27 KG/M2

## 2018-06-06 DIAGNOSIS — C61 PROSTATE CANCER (HCC): Primary | ICD-10-CM

## 2018-06-06 DIAGNOSIS — C61 PROSTATE CANCER (HCC): ICD-10-CM

## 2018-06-06 LAB
ALBUMIN SERPL-MCNC: 4.6 G/DL (ref 3.5–5)
ALBUMIN/GLOB SERPL: 1.6 G/DL (ref 1–2)
ALP SERPL-CCNC: 51 U/L (ref 38–126)
ALT SERPL W P-5'-P-CCNC: 34 U/L (ref 13–69)
ANION GAP SERPL CALCULATED.3IONS-SCNC: 17.9 MMOL/L (ref 10–20)
AST SERPL-CCNC: 36 U/L (ref 15–46)
BASOPHILS # BLD AUTO: 0.03 10*3/MM3 (ref 0–0.2)
BASOPHILS NFR BLD AUTO: 0.6 % (ref 0–2.5)
BILIRUB SERPL-MCNC: 0.7 MG/DL (ref 0.2–1.3)
BUN BLD-MCNC: 18 MG/DL (ref 7–20)
BUN/CREAT SERPL: 18 (ref 6.3–21.9)
CALCIUM SPEC-SCNC: 9.5 MG/DL (ref 8.4–10.2)
CHLORIDE SERPL-SCNC: 103 MMOL/L (ref 98–107)
CO2 SERPL-SCNC: 28 MMOL/L (ref 26–30)
CREAT BLD-MCNC: 1 MG/DL (ref 0.6–1.3)
DEPRECATED RDW RBC AUTO: 45.9 FL (ref 37–54)
EOSINOPHIL # BLD AUTO: 0.38 10*3/MM3 (ref 0–0.7)
EOSINOPHIL NFR BLD AUTO: 7.6 % (ref 0–7)
ERYTHROCYTE [DISTWIDTH] IN BLOOD BY AUTOMATED COUNT: 13.6 % (ref 11.5–14.5)
GFR SERPL CREATININE-BSD FRML MDRD: 72 ML/MIN/1.73
GLOBULIN UR ELPH-MCNC: 2.9 GM/DL
GLUCOSE BLD-MCNC: 202 MG/DL (ref 74–98)
HCT VFR BLD AUTO: 43.1 % (ref 42–52)
HGB BLD-MCNC: 14.1 G/DL (ref 14–18)
IMM GRANULOCYTES # BLD: 0.03 10*3/MM3 (ref 0–0.06)
IMM GRANULOCYTES NFR BLD: 0.6 % (ref 0–0.6)
LYMPHOCYTES # BLD AUTO: 1.6 10*3/MM3 (ref 0.6–3.4)
LYMPHOCYTES NFR BLD AUTO: 31.8 % (ref 10–50)
MCH RBC QN AUTO: 30.1 PG (ref 27–31)
MCHC RBC AUTO-ENTMCNC: 32.7 G/DL (ref 30–37)
MCV RBC AUTO: 91.9 FL (ref 80–94)
MONOCYTES # BLD AUTO: 0.57 10*3/MM3 (ref 0–0.9)
MONOCYTES NFR BLD AUTO: 11.3 % (ref 0–12)
NEUTROPHILS # BLD AUTO: 2.42 10*3/MM3 (ref 2–6.9)
NEUTROPHILS NFR BLD AUTO: 48.1 % (ref 37–80)
NRBC BLD MANUAL-RTO: 0 /100 WBC (ref 0–0)
PLATELET # BLD AUTO: 168 10*3/MM3 (ref 130–400)
PMV BLD AUTO: 9.9 FL (ref 6–12)
POTASSIUM BLD-SCNC: 3.9 MMOL/L (ref 3.5–5.1)
PROT SERPL-MCNC: 7.5 G/DL (ref 6.3–8.2)
PSA SERPL-MCNC: <0.064 NG/ML (ref 0.06–4)
RBC # BLD AUTO: 4.69 10*6/MM3 (ref 4.7–6.1)
SODIUM BLD-SCNC: 145 MMOL/L (ref 137–145)
WBC NRBC COR # BLD: 5.03 10*3/MM3 (ref 4.8–10.8)

## 2018-06-06 PROCEDURE — 80053 COMPREHEN METABOLIC PANEL: CPT

## 2018-06-06 PROCEDURE — 36415 COLL VENOUS BLD VENIPUNCTURE: CPT

## 2018-06-06 PROCEDURE — 84153 ASSAY OF PSA TOTAL: CPT

## 2018-06-06 PROCEDURE — 85025 COMPLETE CBC W/AUTO DIFF WBC: CPT

## 2018-06-06 PROCEDURE — 99214 OFFICE O/P EST MOD 30 MIN: CPT | Performed by: INTERNAL MEDICINE

## 2018-06-06 NOTE — PROGRESS NOTES
DATE OF VISIT: 6/6/2018     REASON FOR VISIT:   1. Prostate cancer. Presented with stage I, [id number], PSA at diagnosis 3 status  post prostatectomy in 2000.  2. Relapsed disease with rise in PSA gradually over the last couple of years,  most recent PSA 7.1 ng/mL on 01/25/2016.      HISTORY OF PRESENT ILLNESS: The patient is a very pleasant 80-year-old  gentleman with past medical history significant for prostate cancer status post  radical prostatectomy in 2000. The patient never received adjuvant treatments,  neither radiation, GnRH or antiandrogen treatment. The patient's PSA started  to go up gradually over the last 2 years. Patient was referred to me on  06/13/2014 and I did restaging workup that came back negative including bone  scan, CAT scans chest, abdomen and pelvis. We elected to proceed with watchful  waiting. Patient is here today in scheduled followup visit.     SUBJECTIVE: Mr. Dietz is here today for follow-up visit.  He continue to do very well with his treatment.  He has been having hot flashes.  He denied any weight loss.  No abdominal pain.     REVIEW OF SYSTEMS: All the other 9 systems are reviewed by me and negative  except what is mentioned in HPI and subjective.      PAST MEDICAL HISTORY/SOCIAL HISTORY/FAMILY HISTORY: Unchanged from my prior  documentation on 06/13/2014.       Current Outpatient Prescriptions:   •  allopurinol (ZYLOPRIM) 300 MG tablet, TAKE ONE TABLET BY MOUTH DAILY, Disp: 30 tablet, Rfl: 5  •  ascorbic acid (VITAMIN C) 1000 MG tablet, Take 1,000 mg by mouth Daily., Disp: , Rfl:   •  aspirin 81 MG EC tablet, Take 81 mg by mouth Daily., Disp: , Rfl:   •  B Complex Vitamins (VITAMIN B COMPLEX PO), Take  by mouth Daily., Disp: , Rfl:   •  CRISTINA CONTOUR TEST test strip, , Disp: , Rfl:   •  Cholecalciferol (VITAMIN D3) 2000 UNITS capsule, Take  by mouth 2 (two) times a day., Disp: , Rfl:   •  Cinnamon 500 MG capsule, Take 2,000 mg by mouth Daily., Disp: , Rfl:   •  Coenzyme Q10 100  "MG tablet, Take 2 tablets by mouth Daily., Disp: , Rfl:   •  fenofibric acid (TRILIPIX) 135 MG capsule delayed-release delayed release capsule, TAKE ONE CAPSULE BY MOUTH DAILY, Disp: 30 capsule, Rfl: 0  •  FLORASTOR 250 MG capsule, TAKE ONE CAPSULE BY MOUTH TWICE A DAY, Disp: 60 capsule, Rfl: 3  •  folic acid (FOLVITE) 400 MCG tablet, Take  by mouth 1 (one) time per week., Disp: , Rfl:   •  hydrochlorothiazide (HYDRODIURIL) 25 MG tablet, Take 25 mg by mouth Daily., Disp: , Rfl:   •  JANUVIA 100 MG tablet, TAKE ONE TABLET BY MOUTH DAILY, Disp: 30 tablet, Rfl: 5  •  JARDIANCE 10 MG tablet, 10 mg Daily., Disp: , Rfl:   •  l-methylfolate-algae-B6-B12 (METANX) 3-90.314-2-35 MG capsule capsule, Take 1 capsule by mouth 2 (Two) Times a Day., Disp: 180 capsule, Rfl: 0  •  Lecithin 1200 MG capsule, Take  by mouth Daily., Disp: , Rfl:   •  metFORMIN (GLUCOPHAGE) 500 MG tablet, Take 2 tablets by mouth 2 (Two) Times a Day With Meals., Disp: 120 tablet, Rfl: 2  •  metoprolol succinate XL (TOPROL-XL) 100 MG 24 hr tablet, TAKE 1 TABLET BY MOUTH ONE TIME A DAY , Disp: 30 tablet, Rfl: 0  •  Multiple Vitamins-Minerals (ICAPS AREDS 2 PO), Take  by mouth Daily., Disp: , Rfl:   •  Omega-3 Fatty Acids (FISH OIL) 1000 MG capsule capsule, Take 1,000 mg by mouth As Needed., Disp: , Rfl:   •  Omega-3 Fatty Acids (OMEGA-3 CF PO), Take  by mouth Daily., Disp: , Rfl:   •  pravastatin (PRAVACHOL) 40 MG tablet, TAKE ONE TABLET BY MOUTH EVERY NIGHT AT BEDTIME, Disp: 30 tablet, Rfl: 5  •  triamterene-hydrochlorothiazide (DYAZIDE) 37.5-25 MG per capsule, TAKE ONE CAPSULE BY MOUTH DAILY, Disp: 30 capsule, Rfl: 5  •  vitamin E 1000 UNIT capsule, Take 1,000 Units by mouth Daily., Disp: , Rfl:     PHYSICAL EXAMINATION:   /73   Pulse 85   Temp 98.3 °F (36.8 °C) (Temporal Artery )   Resp 17   Ht 167.6 cm (66\")   Wt 93.9 kg (207 lb)   BMI 33.41 kg/m²   ECOG1  GENERAL: Age appropriate. No acute distress.   HEENT: Head atraumatic, normocephalic. "   NECK: Supple. No JVD. No lymphadenopathy.   LUNGS: Clear to auscultation bilaterally. No wheezing. No rhonchi.   HEART: Regular rate and rhythm. S1, S2, no murmurs.   ABDOMEN: Soft, nontender, nondistended. Bowel sounds positive. No  hepatosplenomegaly.   EXTREMITIES: No clubbing, cyanosis, or edema.   SKIN: No rashes. No purpura.   NEUROLOGIC: Awake and oriented x3. Strength 5 out of 5 in all muscle groups.     Lab on 06/06/2018   Component Date Value Ref Range Status   • Glucose 06/06/2018 202* 74 - 98 mg/dL Final    Glucose >180, Hemoglobin A1C recommended.   • BUN 06/06/2018 18  7 - 20 mg/dL Final   • Creatinine 06/06/2018 1.00  0.60 - 1.30 mg/dL Final   • Sodium 06/06/2018 145  137 - 145 mmol/L Final   • Potassium 06/06/2018 3.9  3.5 - 5.1 mmol/L Final   • Chloride 06/06/2018 103  98 - 107 mmol/L Final   • CO2 06/06/2018 28.0  26.0 - 30.0 mmol/L Final   • Calcium 06/06/2018 9.5  8.4 - 10.2 mg/dL Final   • Total Protein 06/06/2018 7.5  6.3 - 8.2 g/dL Final   • Albumin 06/06/2018 4.60  3.50 - 5.00 g/dL Final   • ALT (SGPT) 06/06/2018 34  13 - 69 U/L Final   • AST (SGOT) 06/06/2018 36  15 - 46 U/L Final   • Alkaline Phosphatase 06/06/2018 51  38 - 126 U/L Final   • Total Bilirubin 06/06/2018 0.7  0.2 - 1.3 mg/dL Final   • eGFR Non  Amer 06/06/2018 72  >60 mL/min/1.73 Final   • Globulin 06/06/2018 2.9  gm/dL Final   • A/G Ratio 06/06/2018 1.6  1.0 - 2.0 g/dL Final   • BUN/Creatinine Ratio 06/06/2018 18.0  6.3 - 21.9 Final   • Anion Gap 06/06/2018 17.9  10.0 - 20.0 mmol/L Final   • PSA 06/06/2018 <0.064  0.060 - 4.000 ng/mL Final        ASSESSMENT: The patient is a very pleasant 80-year-old gentleman with relapsed  prostate cancer.     PROBLEM LIST:  1. Prostate cancer, initially presented as [id number] status post radical  prostatectomy 2000.  2. Rise in PSA gradually over the last 2 years,   3. Multiple comorbidities including type 2 diabetes, hypertension, morbid  obesity, osteoarthritis, and  hypercholesterolemia.  4. Erectile dysfunction.   5.  Family history of colon cancer  6.  Osteopenia     PLAN:  1. I did go over the results in detail with the patient. I explained to him that  his his PSA is down to <0.064.   I will continue Eligard 22.5mg q3 month.   2. The patient will come back to see me in 3 months with repeat blood work as well as serum PSA.   3. I will continue Viagra as needed for erectile dysfunction.   4.  We'll continue Januvia as well as metformin for type 2 diabetes  5.  We'll continue pravastatin for hypercholesterolemia  6.  Patient need to have 5 year follow-up study which would be due 11/2021.  7. We reviewed the potential side effects of the treatment including hot flashes, impotence, joint pain, decrease in bone density, mood or sleep disturbance, and asthenia.  8.  I'll start the patient on Prolia 60 mg subcutaneous every 6 month.  We'll continue calcium with vitamin D daily.  Shannan Ramon MD  6/6/2018

## 2018-06-15 ENCOUNTER — INFUSION (OUTPATIENT)
Dept: ONCOLOGY | Facility: HOSPITAL | Age: 83
End: 2018-06-15

## 2018-06-15 VITALS
SYSTOLIC BLOOD PRESSURE: 148 MMHG | TEMPERATURE: 96.4 F | HEART RATE: 82 BPM | DIASTOLIC BLOOD PRESSURE: 60 MMHG | RESPIRATION RATE: 16 BRPM

## 2018-06-15 DIAGNOSIS — C61 PROSTATE CANCER (HCC): Primary | ICD-10-CM

## 2018-06-15 LAB
ALBUMIN SERPL-MCNC: 4.2 G/DL (ref 3.5–5)
ALBUMIN/GLOB SERPL: 1.4 G/DL (ref 1–2)
ALP SERPL-CCNC: 46 U/L (ref 38–126)
ALT SERPL W P-5'-P-CCNC: 34 U/L (ref 13–69)
ANION GAP SERPL CALCULATED.3IONS-SCNC: 11.9 MMOL/L (ref 10–20)
AST SERPL-CCNC: 33 U/L (ref 15–46)
BASOPHILS # BLD AUTO: 0.03 10*3/MM3 (ref 0–0.2)
BASOPHILS NFR BLD AUTO: 0.7 % (ref 0–2.5)
BILIRUB SERPL-MCNC: 0.7 MG/DL (ref 0.2–1.3)
BUN BLD-MCNC: 21 MG/DL (ref 7–20)
BUN/CREAT SERPL: 23.3 (ref 6.3–21.9)
CALCIUM SPEC-SCNC: 9.3 MG/DL (ref 8.4–10.2)
CHLORIDE SERPL-SCNC: 104 MMOL/L (ref 98–107)
CO2 SERPL-SCNC: 28 MMOL/L (ref 26–30)
CREAT BLD-MCNC: 0.9 MG/DL (ref 0.6–1.3)
DEPRECATED RDW RBC AUTO: 45 FL (ref 37–54)
EOSINOPHIL # BLD AUTO: 0.32 10*3/MM3 (ref 0–0.7)
EOSINOPHIL NFR BLD AUTO: 7.1 % (ref 0–7)
ERYTHROCYTE [DISTWIDTH] IN BLOOD BY AUTOMATED COUNT: 13.4 % (ref 11.5–14.5)
GFR SERPL CREATININE-BSD FRML MDRD: 81 ML/MIN/1.73
GLOBULIN UR ELPH-MCNC: 2.9 GM/DL
GLUCOSE BLD-MCNC: 253 MG/DL (ref 74–98)
HCT VFR BLD AUTO: 41.6 % (ref 42–52)
HGB BLD-MCNC: 13.7 G/DL (ref 14–18)
IMM GRANULOCYTES # BLD: 0.01 10*3/MM3 (ref 0–0.06)
IMM GRANULOCYTES NFR BLD: 0.2 % (ref 0–0.6)
LYMPHOCYTES # BLD AUTO: 1.12 10*3/MM3 (ref 0.6–3.4)
LYMPHOCYTES NFR BLD AUTO: 24.8 % (ref 10–50)
MAGNESIUM SERPL-MCNC: 1.9 MG/DL (ref 1.6–2.3)
MCH RBC QN AUTO: 30.5 PG (ref 27–31)
MCHC RBC AUTO-ENTMCNC: 32.9 G/DL (ref 30–37)
MCV RBC AUTO: 92.7 FL (ref 80–94)
MONOCYTES # BLD AUTO: 0.33 10*3/MM3 (ref 0–0.9)
MONOCYTES NFR BLD AUTO: 7.3 % (ref 0–12)
NEUTROPHILS # BLD AUTO: 2.71 10*3/MM3 (ref 2–6.9)
NEUTROPHILS NFR BLD AUTO: 59.9 % (ref 37–80)
NRBC BLD MANUAL-RTO: 0 /100 WBC (ref 0–0)
PHOSPHATE SERPL-MCNC: 3.5 MG/DL (ref 2.5–4.5)
PLATELET # BLD AUTO: 154 10*3/MM3 (ref 130–400)
PMV BLD AUTO: 10.3 FL (ref 6–12)
POTASSIUM BLD-SCNC: 3.9 MMOL/L (ref 3.5–5.1)
PROT SERPL-MCNC: 7.1 G/DL (ref 6.3–8.2)
PSA SERPL-MCNC: <0.064 NG/ML (ref 0.06–4)
RBC # BLD AUTO: 4.49 10*6/MM3 (ref 4.7–6.1)
SODIUM BLD-SCNC: 140 MMOL/L (ref 137–145)
WBC NRBC COR # BLD: 4.52 10*3/MM3 (ref 4.8–10.8)

## 2018-06-15 PROCEDURE — 25010000002 DENOSUMAB 60 MG/ML SOLUTION: Performed by: INTERNAL MEDICINE

## 2018-06-15 PROCEDURE — 83735 ASSAY OF MAGNESIUM: CPT

## 2018-06-15 PROCEDURE — 85025 COMPLETE CBC W/AUTO DIFF WBC: CPT

## 2018-06-15 PROCEDURE — 25010000002 LEUPROLIDE 22.5 MG KIT: Performed by: NURSE PRACTITIONER

## 2018-06-15 PROCEDURE — 84100 ASSAY OF PHOSPHORUS: CPT

## 2018-06-15 PROCEDURE — 36415 COLL VENOUS BLD VENIPUNCTURE: CPT

## 2018-06-15 PROCEDURE — 96402 CHEMO HORMON ANTINEOPL SQ/IM: CPT

## 2018-06-15 PROCEDURE — 96372 THER/PROPH/DIAG INJ SC/IM: CPT

## 2018-06-15 PROCEDURE — 84153 ASSAY OF PSA TOTAL: CPT

## 2018-06-15 PROCEDURE — 80053 COMPREHEN METABOLIC PANEL: CPT

## 2018-06-15 RX ADMIN — DENOSUMAB 60 MG: 60 INJECTION SUBCUTANEOUS at 13:20

## 2018-06-15 RX ADMIN — LEUPROLIDE ACETATE 22.5 MG: KIT SUBCUTANEOUS at 13:25

## 2018-06-25 RX ORDER — FENOFIBRIC ACID 135 MG/1
CAPSULE, DELAYED RELEASE ORAL
Qty: 30 CAPSULE | Refills: 0 | Status: SHIPPED | OUTPATIENT
Start: 2018-06-25 | End: 2018-07-22 | Stop reason: SDUPTHER

## 2018-07-01 RX ORDER — METOPROLOL SUCCINATE 100 MG/1
TABLET, EXTENDED RELEASE ORAL
Qty: 30 TABLET | Refills: 0 | Status: SHIPPED | OUTPATIENT
Start: 2018-07-01 | End: 2018-08-06 | Stop reason: SDUPTHER

## 2018-07-23 RX ORDER — FENOFIBRIC ACID 135 MG/1
CAPSULE, DELAYED RELEASE ORAL
Qty: 30 CAPSULE | Refills: 0 | Status: SHIPPED | OUTPATIENT
Start: 2018-07-23 | End: 2018-08-20 | Stop reason: SDUPTHER

## 2018-08-06 RX ORDER — METOPROLOL SUCCINATE 100 MG/1
TABLET, EXTENDED RELEASE ORAL
Qty: 30 TABLET | Refills: 0 | Status: SHIPPED | OUTPATIENT
Start: 2018-08-06 | End: 2018-08-22 | Stop reason: SDUPTHER

## 2018-08-14 RX ORDER — L-METHYLFOLATE-ALGAE-VIT B12-B6 CAP 3-90.314-2-35 MG 3-90.314-2-35 MG
1 CAP ORAL 2 TIMES DAILY
Qty: 180 CAPSULE | Refills: 0 | Status: SHIPPED | OUTPATIENT
Start: 2018-08-14 | End: 2019-07-12

## 2018-08-20 RX ORDER — FENOFIBRIC ACID 135 MG/1
CAPSULE, DELAYED RELEASE ORAL
Qty: 30 CAPSULE | Refills: 0 | Status: SHIPPED | OUTPATIENT
Start: 2018-08-20 | End: 2018-09-24 | Stop reason: SDUPTHER

## 2018-08-22 RX ORDER — METOPROLOL SUCCINATE 100 MG/1
TABLET, EXTENDED RELEASE ORAL
Qty: 30 TABLET | Refills: 0 | Status: SHIPPED | OUTPATIENT
Start: 2018-08-22 | End: 2018-10-15 | Stop reason: SDUPTHER

## 2018-09-04 ENCOUNTER — LAB (OUTPATIENT)
Dept: LAB | Facility: HOSPITAL | Age: 83
End: 2018-09-04

## 2018-09-04 DIAGNOSIS — C61 PROSTATE CANCER (HCC): ICD-10-CM

## 2018-09-04 LAB
ALBUMIN SERPL-MCNC: 4.8 G/DL (ref 3.5–5)
ALBUMIN/GLOB SERPL: 1.8 G/DL (ref 1–2)
ALP SERPL-CCNC: 34 U/L (ref 38–126)
ALT SERPL W P-5'-P-CCNC: 43 U/L (ref 13–69)
ANION GAP SERPL CALCULATED.3IONS-SCNC: 17.2 MMOL/L (ref 10–20)
AST SERPL-CCNC: 51 U/L (ref 15–46)
BASOPHILS # BLD AUTO: 0.04 10*3/MM3 (ref 0–0.2)
BASOPHILS NFR BLD AUTO: 0.6 % (ref 0–2.5)
BILIRUB SERPL-MCNC: 0.8 MG/DL (ref 0.2–1.3)
BUN BLD-MCNC: 22 MG/DL (ref 7–20)
BUN/CREAT SERPL: 22 (ref 6.3–21.9)
CALCIUM SPEC-SCNC: 9.7 MG/DL (ref 8.4–10.2)
CHLORIDE SERPL-SCNC: 102 MMOL/L (ref 98–107)
CO2 SERPL-SCNC: 27 MMOL/L (ref 26–30)
CREAT BLD-MCNC: 1 MG/DL (ref 0.6–1.3)
DEPRECATED RDW RBC AUTO: 45.2 FL (ref 37–54)
EOSINOPHIL # BLD AUTO: 0.38 10*3/MM3 (ref 0–0.7)
EOSINOPHIL NFR BLD AUTO: 5.9 % (ref 0–7)
ERYTHROCYTE [DISTWIDTH] IN BLOOD BY AUTOMATED COUNT: 13.5 % (ref 11.5–14.5)
GFR SERPL CREATININE-BSD FRML MDRD: 72 ML/MIN/1.73
GLOBULIN UR ELPH-MCNC: 2.7 GM/DL
GLUCOSE BLD-MCNC: 171 MG/DL (ref 74–98)
HCT VFR BLD AUTO: 46.5 % (ref 42–52)
HGB BLD-MCNC: 14.9 G/DL (ref 14–18)
IMM GRANULOCYTES # BLD: 0.03 10*3/MM3 (ref 0–0.06)
IMM GRANULOCYTES NFR BLD: 0.5 % (ref 0–0.6)
LYMPHOCYTES # BLD AUTO: 1.69 10*3/MM3 (ref 0.6–3.4)
LYMPHOCYTES NFR BLD AUTO: 26.3 % (ref 10–50)
MCH RBC QN AUTO: 29.6 PG (ref 27–31)
MCHC RBC AUTO-ENTMCNC: 32 G/DL (ref 30–37)
MCV RBC AUTO: 92.4 FL (ref 80–94)
MONOCYTES # BLD AUTO: 0.56 10*3/MM3 (ref 0–0.9)
MONOCYTES NFR BLD AUTO: 8.7 % (ref 0–12)
NEUTROPHILS # BLD AUTO: 3.73 10*3/MM3 (ref 2–6.9)
NEUTROPHILS NFR BLD AUTO: 58 % (ref 37–80)
NRBC BLD MANUAL-RTO: 0 /100 WBC (ref 0–0)
PLATELET # BLD AUTO: 183 10*3/MM3 (ref 130–400)
PMV BLD AUTO: 10.5 FL (ref 6–12)
POTASSIUM BLD-SCNC: 4.2 MMOL/L (ref 3.5–5.1)
PROT SERPL-MCNC: 7.5 G/DL (ref 6.3–8.2)
RBC # BLD AUTO: 5.03 10*6/MM3 (ref 4.7–6.1)
SODIUM BLD-SCNC: 142 MMOL/L (ref 137–145)
WBC NRBC COR # BLD: 6.43 10*3/MM3 (ref 4.8–10.8)

## 2018-09-04 PROCEDURE — 85025 COMPLETE CBC W/AUTO DIFF WBC: CPT

## 2018-09-04 PROCEDURE — 80053 COMPREHEN METABOLIC PANEL: CPT

## 2018-09-04 PROCEDURE — 36415 COLL VENOUS BLD VENIPUNCTURE: CPT

## 2018-09-04 PROCEDURE — 84153 ASSAY OF PSA TOTAL: CPT

## 2018-09-05 ENCOUNTER — INFUSION (OUTPATIENT)
Dept: ONCOLOGY | Facility: HOSPITAL | Age: 83
End: 2018-09-05

## 2018-09-05 ENCOUNTER — OFFICE VISIT (OUTPATIENT)
Dept: ONCOLOGY | Facility: CLINIC | Age: 83
End: 2018-09-05

## 2018-09-05 VITALS
SYSTOLIC BLOOD PRESSURE: 157 MMHG | TEMPERATURE: 97 F | WEIGHT: 210 LBS | HEIGHT: 66 IN | RESPIRATION RATE: 16 BRPM | DIASTOLIC BLOOD PRESSURE: 73 MMHG | BODY MASS INDEX: 33.75 KG/M2 | HEART RATE: 44 BPM

## 2018-09-05 DIAGNOSIS — C61 PROSTATE CANCER (HCC): Primary | ICD-10-CM

## 2018-09-05 LAB — PSA SERPL-MCNC: <0.064 NG/ML (ref 0.06–4)

## 2018-09-05 PROCEDURE — 99214 OFFICE O/P EST MOD 30 MIN: CPT | Performed by: INTERNAL MEDICINE

## 2018-09-05 PROCEDURE — 96402 CHEMO HORMON ANTINEOPL SQ/IM: CPT

## 2018-09-05 PROCEDURE — 25010000002 LEUPROLIDE 22.5 MG KIT: Performed by: NURSE PRACTITIONER

## 2018-09-05 RX ORDER — CLINDAMYCIN HYDROCHLORIDE 300 MG/1
CAPSULE ORAL
Refills: 0 | COMMUNITY
Start: 2018-07-26 | End: 2019-07-12

## 2018-09-05 RX ADMIN — LEUPROLIDE ACETATE 22.5 MG: KIT SUBCUTANEOUS at 14:25

## 2018-09-05 NOTE — PROGRESS NOTES
DATE OF VISIT: 9/5/2018     REASON FOR VISIT:   1. Prostate cancer. Presented with stage I, [id number], PSA at diagnosis 3 status  post prostatectomy in 2000.  2. Relapsed disease with rise in PSA gradually over the last couple of years,  most recent PSA 7.1 ng/mL on 01/25/2016.      HISTORY OF PRESENT ILLNESS: The patient is a very pleasant 80-year-old  gentleman with past medical history significant for prostate cancer status post  radical prostatectomy in 2000. The patient never received adjuvant treatments,  neither radiation, GnRH or antiandrogen treatment. The patient's PSA started  to go up gradually over the last 2 years. Patient was referred to me on  06/13/2014 and I did restaging workup that came back negative including bone  scan, CAT scans chest, abdomen and pelvis. We elected to proceed with watchful  waiting. Patient is here today in scheduled followup visit.     SUBJECTIVE: Mr. Dietz is here today for follow-up visit.  He continue to do very well with his treatment.  He has been having hot flashes.  He denied any weight loss.  No abdominal pain.     REVIEW OF SYSTEMS: All the other 9 systems are reviewed by me and negative  except what is mentioned in HPI and subjective.      PAST MEDICAL HISTORY/SOCIAL HISTORY/FAMILY HISTORY: Unchanged from my prior  documentation on 06/13/2014.       Current Outpatient Prescriptions:   •  allopurinol (ZYLOPRIM) 300 MG tablet, TAKE ONE TABLET BY MOUTH DAILY, Disp: 30 tablet, Rfl: 5  •  ascorbic acid (VITAMIN C) 1000 MG tablet, Take 1,000 mg by mouth Daily., Disp: , Rfl:   •  aspirin 81 MG EC tablet, Take 81 mg by mouth Daily., Disp: , Rfl:   •  B Complex Vitamins (VITAMIN B COMPLEX PO), Take  by mouth Daily., Disp: , Rfl:   •  CRISTINA CONTOUR TEST test strip, , Disp: , Rfl:   •  Cholecalciferol (VITAMIN D3) 2000 UNITS capsule, Take  by mouth 2 (two) times a day., Disp: , Rfl:   •  Cinnamon 500 MG capsule, Take 2,000 mg by mouth Daily., Disp: , Rfl:   •  clindamycin  "(CLEOCIN) 300 MG capsule, TAKE 1 CAPSULE BY MOUTH THREE TIMES A DAY UNTIL GONE, Disp: , Rfl: 0  •  Coenzyme Q10 100 MG tablet, Take 2 tablets by mouth Daily., Disp: , Rfl:   •  fenofibric acid (TRILIPIX) 135 MG capsule delayed-release delayed release capsule, TAKE ONE CAPSULE BY MOUTH DAILY, Disp: 30 capsule, Rfl: 0  •  FLORASTOR 250 MG capsule, TAKE ONE CAPSULE BY MOUTH TWICE A DAY, Disp: 60 capsule, Rfl: 3  •  folic acid (FOLVITE) 400 MCG tablet, Take  by mouth 1 (one) time per week., Disp: , Rfl:   •  hydrochlorothiazide (HYDRODIURIL) 25 MG tablet, Take 25 mg by mouth Daily., Disp: , Rfl:   •  JANUVIA 100 MG tablet, TAKE ONE TABLET BY MOUTH DAILY, Disp: 30 tablet, Rfl: 5  •  JARDIANCE 10 MG tablet, 10 mg Daily., Disp: , Rfl:   •  l-methylfolate-algae-B6-B12 (METANX) 3-90.314-2-35 MG capsule capsule, Take 1 capsule by mouth 2 (Two) Times a Day., Disp: 180 capsule, Rfl: 0  •  Lecithin 1200 MG capsule, Take  by mouth Daily., Disp: , Rfl:   •  metFORMIN (GLUCOPHAGE) 500 MG tablet, Take 2 tablets by mouth 2 (Two) Times a Day With Meals., Disp: 120 tablet, Rfl: 2  •  metoprolol succinate XL (TOPROL-XL) 100 MG 24 hr tablet, TAKE 1 TABLET BY MOUTH ONE TIME A DAY , Disp: 30 tablet, Rfl: 0  •  Multiple Vitamins-Minerals (ICAPS AREDS 2 PO), Take  by mouth Daily., Disp: , Rfl:   •  Omega-3 Fatty Acids (FISH OIL) 1000 MG capsule capsule, Take 1,000 mg by mouth As Needed., Disp: , Rfl:   •  Omega-3 Fatty Acids (OMEGA-3 CF PO), Take  by mouth Daily., Disp: , Rfl:   •  pravastatin (PRAVACHOL) 40 MG tablet, TAKE ONE TABLET BY MOUTH EVERY NIGHT AT BEDTIME, Disp: 30 tablet, Rfl: 5  •  triamterene-hydrochlorothiazide (DYAZIDE) 37.5-25 MG per capsule, TAKE ONE CAPSULE BY MOUTH DAILY, Disp: 30 capsule, Rfl: 5  •  vitamin E 1000 UNIT capsule, Take 1,000 Units by mouth Daily., Disp: , Rfl:     PHYSICAL EXAMINATION:   /73   Pulse (!) 44   Temp 97 °F (36.1 °C) (Temporal Artery )   Resp 16   Ht 167.6 cm (66\")   Wt 95.3 kg (210 " lb)   BMI 33.89 kg/m²   ECOG1  GENERAL: Age appropriate. No acute distress.   HEENT: Head atraumatic, normocephalic.   NECK: Supple. No JVD. No lymphadenopathy.   LUNGS: Clear to auscultation bilaterally. No wheezing. No rhonchi.   HEART: Regular rate and rhythm. S1, S2, no murmurs.   ABDOMEN: Soft, nontender, nondistended. Bowel sounds positive. No  hepatosplenomegaly.   EXTREMITIES: No clubbing, cyanosis, or edema.   SKIN: No rashes. No purpura.   NEUROLOGIC: Awake and oriented x3. Strength 5 out of 5 in all muscle groups.     Lab on 09/04/2018   Component Date Value Ref Range Status   • Glucose 09/04/2018 171* 74 - 98 mg/dL Final   • BUN 09/04/2018 22* 7 - 20 mg/dL Final   • Creatinine 09/04/2018 1.00  0.60 - 1.30 mg/dL Final   • Sodium 09/04/2018 142  137 - 145 mmol/L Final   • Potassium 09/04/2018 4.2  3.5 - 5.1 mmol/L Final   • Chloride 09/04/2018 102  98 - 107 mmol/L Final   • CO2 09/04/2018 27.0  26.0 - 30.0 mmol/L Final   • Calcium 09/04/2018 9.7  8.4 - 10.2 mg/dL Final   • Total Protein 09/04/2018 7.5  6.3 - 8.2 g/dL Final   • Albumin 09/04/2018 4.80  3.50 - 5.00 g/dL Final   • ALT (SGPT) 09/04/2018 43  13 - 69 U/L Final   • AST (SGOT) 09/04/2018 51* 15 - 46 U/L Final   • Alkaline Phosphatase 09/04/2018 34* 38 - 126 U/L Final   • Total Bilirubin 09/04/2018 0.8  0.2 - 1.3 mg/dL Final   • eGFR Non  Amer 09/04/2018 72  >60 mL/min/1.73 Final   • Globulin 09/04/2018 2.7  gm/dL Final   • A/G Ratio 09/04/2018 1.8  1.0 - 2.0 g/dL Final   • BUN/Creatinine Ratio 09/04/2018 22.0* 6.3 - 21.9 Final   • Anion Gap 09/04/2018 17.2  10.0 - 20.0 mmol/L Final   • WBC 09/04/2018 6.43  4.80 - 10.80 10*3/mm3 Final   • RBC 09/04/2018 5.03  4.70 - 6.10 10*6/mm3 Final   • Hemoglobin 09/04/2018 14.9  14.0 - 18.0 g/dL Final   • Hematocrit 09/04/2018 46.5  42.0 - 52.0 % Final   • MCV 09/04/2018 92.4  80.0 - 94.0 fL Final   • MCH 09/04/2018 29.6  27.0 - 31.0 pg Final   • MCHC 09/04/2018 32.0  30.0 - 37.0 g/dL Final   • RDW  09/04/2018 13.5  11.5 - 14.5 % Final   • RDW-SD 09/04/2018 45.2  37.0 - 54.0 fl Final   • MPV 09/04/2018 10.5  6.0 - 12.0 fL Final   • Platelets 09/04/2018 183  130 - 400 10*3/mm3 Final   • Neutrophil % 09/04/2018 58.0  37.0 - 80.0 % Final   • Lymphocyte % 09/04/2018 26.3  10.0 - 50.0 % Final   • Monocyte % 09/04/2018 8.7  0.0 - 12.0 % Final   • Eosinophil % 09/04/2018 5.9  0.0 - 7.0 % Final   • Basophil % 09/04/2018 0.6  0.0 - 2.5 % Final   • Immature Grans % 09/04/2018 0.5  0.0 - 0.6 % Final   • Neutrophils, Absolute 09/04/2018 3.73  2.00 - 6.90 10*3/mm3 Final   • Lymphocytes, Absolute 09/04/2018 1.69  0.60 - 3.40 10*3/mm3 Final   • Monocytes, Absolute 09/04/2018 0.56  0.00 - 0.90 10*3/mm3 Final   • Eosinophils, Absolute 09/04/2018 0.38  0.00 - 0.70 10*3/mm3 Final   • Basophils, Absolute 09/04/2018 0.04  0.00 - 0.20 10*3/mm3 Final   • Immature Grans, Absolute 09/04/2018 0.03  0.00 - 0.06 10*3/mm3 Final   • nRBC 09/04/2018 0.0  0.0 - 0.0 /100 WBC Final        ASSESSMENT: The patient is a very pleasant 80-year-old gentleman with relapsed  prostate cancer.     PROBLEM LIST:  1. Prostate cancer, initially presented as [id number] status post radical  prostatectomy 2000.  2. Rise in PSA gradually over the last 2 years,   3. Multiple comorbidities including type 2 diabetes, hypertension, morbid  obesity, osteoarthritis, and hypercholesterolemia.  4. Erectile dysfunction.   5.  Family history of colon cancer  6.  Osteopenia     PLAN:  1. I did go over the results in detail with the patient. I explained to him that  his his PSA is down to <0.064.   I will continue Eligard 22.5mg q3 month.   2. The patient will come back to see me in 3 months with repeat blood work as well as serum PSA.   3. I will continue Viagra as needed for erectile dysfunction.   4.  We'll continue Januvia as well as metformin for type 2 diabetes.  His sugars were 171 yesterday.  5.  We'll continue pravastatin for hypercholesterolemia  6.  Patient need to  have 5 year follow-up colonoscopy which would be due 11/2021.  7. We reviewed the potential side effects of the treatment including hot flashes, impotence, joint pain, decrease in bone density, mood or sleep disturbance, and asthenia.  8.  I'll continue the patient on Prolia 60 mg subcutaneous every 6 month.  His next dose will be due December 2018 We'll continue calcium with vitamin D daily.   Shannan Ramon MD  9/5/2018

## 2018-09-24 RX ORDER — FENOFIBRIC ACID 135 MG/1
CAPSULE, DELAYED RELEASE ORAL
Qty: 30 CAPSULE | Refills: 2 | Status: SHIPPED | OUTPATIENT
Start: 2018-09-24 | End: 2018-12-23 | Stop reason: SDUPTHER

## 2018-10-02 ENCOUNTER — OFFICE VISIT (OUTPATIENT)
Dept: INTERNAL MEDICINE | Facility: CLINIC | Age: 83
End: 2018-10-02

## 2018-10-02 VITALS
WEIGHT: 208 LBS | DIASTOLIC BLOOD PRESSURE: 74 MMHG | BODY MASS INDEX: 33.43 KG/M2 | SYSTOLIC BLOOD PRESSURE: 132 MMHG | HEART RATE: 74 BPM | OXYGEN SATURATION: 97 % | HEIGHT: 66 IN

## 2018-10-02 DIAGNOSIS — E78.00 HYPERCHOLESTEREMIA: ICD-10-CM

## 2018-10-02 DIAGNOSIS — Z23 NEED FOR VACCINATION: ICD-10-CM

## 2018-10-02 DIAGNOSIS — M15.9 OSTEOARTHRITIS OF MULTIPLE JOINTS, UNSPECIFIED OSTEOARTHRITIS TYPE: ICD-10-CM

## 2018-10-02 DIAGNOSIS — I10 ESSENTIAL HYPERTENSION: Primary | ICD-10-CM

## 2018-10-02 LAB
ARTICHOKE IGE QN: 83 MG/DL (ref 0–130)
CHOLEST SERPL-MCNC: 142 MG/DL (ref 0–200)
HDLC SERPL-MCNC: 45 MG/DL (ref 40–60)
TRIGL SERPL-MCNC: 181 MG/DL (ref 0–150)

## 2018-10-02 PROCEDURE — G0008 ADMIN INFLUENZA VIRUS VAC: HCPCS | Performed by: INTERNAL MEDICINE

## 2018-10-02 PROCEDURE — 90670 PCV13 VACCINE IM: CPT | Performed by: INTERNAL MEDICINE

## 2018-10-02 PROCEDURE — 36415 COLL VENOUS BLD VENIPUNCTURE: CPT | Performed by: INTERNAL MEDICINE

## 2018-10-02 PROCEDURE — G0009 ADMIN PNEUMOCOCCAL VACCINE: HCPCS | Performed by: INTERNAL MEDICINE

## 2018-10-02 PROCEDURE — 80061 LIPID PANEL: CPT | Performed by: INTERNAL MEDICINE

## 2018-10-02 PROCEDURE — 90662 IIV NO PRSV INCREASED AG IM: CPT | Performed by: INTERNAL MEDICINE

## 2018-10-02 PROCEDURE — 99213 OFFICE O/P EST LOW 20 MIN: CPT | Performed by: INTERNAL MEDICINE

## 2018-10-02 NOTE — PROGRESS NOTES
Subjective   Blane Dietz is a 83 y.o. male.   Chief Complaint   Patient presents with   • Essential hypertension     f/u        Hypertension   This is a chronic problem. The current episode started more than 1 year ago. Pertinent negatives include no chest pain, headaches, neck pain, palpitations or shortness of breath.   Osteoarthritis   Pertinent negatives include no abdominal pain, arthralgias, chest pain, chills, congestion, coughing, diaphoresis, fatigue, fever, headaches, myalgias, nausea, neck pain, numbness, rash, sore throat or vomiting.   Hyperlipidemia   This is a chronic problem. The current episode started more than 1 year ago. Pertinent negatives include no chest pain, myalgias or shortness of breath.        Prostate cancer and is following with onc.    The following portions of the patient's history were reviewed and updated as appropriate: allergies, current medications, past family history, past medical history, past social history, past surgical history and problem list.    Review of Systems   Constitutional: Negative for activity change, appetite change, chills, diaphoresis, fatigue, fever and unexpected weight change.   HENT: Negative for congestion, ear discharge, ear pain, mouth sores, nosebleeds, sinus pressure, sneezing and sore throat.    Eyes: Negative for pain, discharge and itching.   Respiratory: Negative for cough, chest tightness, shortness of breath and wheezing.    Cardiovascular: Negative for chest pain, palpitations and leg swelling.   Gastrointestinal: Negative for abdominal pain, constipation, diarrhea, nausea and vomiting.   Endocrine: Negative for cold intolerance, heat intolerance, polydipsia and polyphagia.   Genitourinary: Negative for dysuria, flank pain, frequency, hematuria and urgency.   Musculoskeletal: Negative for arthralgias, back pain, gait problem, myalgias, neck pain and neck stiffness.   Skin: Negative for color change, pallor and rash.   Neurological:  "Negative for seizures, speech difficulty, numbness and headaches.   Psychiatric/Behavioral: Negative for agitation, confusion, decreased concentration and sleep disturbance. The patient is not nervous/anxious.      /74   Pulse 74   Ht 167.6 cm (66\")   Wt 94.3 kg (208 lb)   SpO2 97%   BMI 33.57 kg/m²     Objective   Physical Exam   Constitutional: He appears well-developed.   HENT:   Head: Normocephalic.   Right Ear: External ear normal.   Left Ear: External ear normal.   Nose: Nose normal.   Mouth/Throat: Oropharynx is clear and moist.   Eyes: Pupils are equal, round, and reactive to light. Conjunctivae are normal.   Neck: No JVD present. No thyromegaly present.   Cardiovascular: Normal rate, regular rhythm and normal heart sounds.  Exam reveals no friction rub.    No murmur heard.  Pulmonary/Chest: Effort normal and breath sounds normal. No respiratory distress. He has no wheezes. He has no rales.   Abdominal: Soft. Bowel sounds are normal. He exhibits no distension. There is no tenderness. There is no guarding.   Musculoskeletal: He exhibits no edema or tenderness.   Lymphadenopathy:     He has no cervical adenopathy.   Neurological: He displays normal reflexes. No cranial nerve deficit.   Skin: No rash noted.   Psychiatric: His behavior is normal.   Nursing note and vitals reviewed.      Assessment/Plan   Blane was seen today for hypertension and osteoarthritis.    Diagnoses and all orders for this visit:    Essential hypertension  stable  Hypercholesteremia  Osteoarthritis  stable  Prostate cancer  Followed by onc. PSA trending back down.   DM  Follows with Endo regarding his blood sugars.     Prevnar and flu vac given today       "

## 2018-10-15 RX ORDER — METOPROLOL SUCCINATE 100 MG/1
TABLET, EXTENDED RELEASE ORAL
Qty: 30 TABLET | Refills: 0 | Status: SHIPPED | OUTPATIENT
Start: 2018-10-15 | End: 2018-11-19 | Stop reason: SDUPTHER

## 2018-10-20 ENCOUNTER — APPOINTMENT (OUTPATIENT)
Dept: CT IMAGING | Facility: HOSPITAL | Age: 83
End: 2018-10-20

## 2018-10-20 ENCOUNTER — HOSPITAL ENCOUNTER (EMERGENCY)
Facility: HOSPITAL | Age: 83
Discharge: HOME | End: 2018-10-20
Attending: EMERGENCY MEDICINE | Admitting: EMERGENCY MEDICINE

## 2018-10-20 VITALS
WEIGHT: 205 LBS | HEART RATE: 78 BPM | TEMPERATURE: 97.7 F | DIASTOLIC BLOOD PRESSURE: 71 MMHG | BODY MASS INDEX: 32.95 KG/M2 | HEIGHT: 66 IN | OXYGEN SATURATION: 96 % | SYSTOLIC BLOOD PRESSURE: 150 MMHG | RESPIRATION RATE: 20 BRPM

## 2018-10-20 DIAGNOSIS — M47.812 SPONDYLOSIS OF CERVICAL REGION WITHOUT MYELOPATHY OR RADICULOPATHY: ICD-10-CM

## 2018-10-20 DIAGNOSIS — M43.6 TORTICOLLIS, ACUTE: Primary | ICD-10-CM

## 2018-10-20 PROCEDURE — 72125 CT NECK SPINE W/O DYE: CPT

## 2018-10-20 PROCEDURE — 25010000002 KETOROLAC TROMETHAMINE PER 15 MG: Performed by: EMERGENCY MEDICINE

## 2018-10-20 PROCEDURE — 99283 EMERGENCY DEPT VISIT LOW MDM: CPT

## 2018-10-20 PROCEDURE — 96372 THER/PROPH/DIAG INJ SC/IM: CPT

## 2018-10-20 RX ORDER — OXYCODONE HYDROCHLORIDE AND ACETAMINOPHEN 5; 325 MG/1; MG/1
1 TABLET ORAL ONCE
Status: COMPLETED | OUTPATIENT
Start: 2018-10-20 | End: 2018-10-20

## 2018-10-20 RX ORDER — TRAMADOL HYDROCHLORIDE 50 MG/1
50 TABLET ORAL EVERY 6 HOURS PRN
Qty: 12 TABLET | Refills: 0 | Status: SHIPPED | OUTPATIENT
Start: 2018-10-20 | End: 2018-12-19 | Stop reason: HOSPADM

## 2018-10-20 RX ORDER — KETOROLAC TROMETHAMINE 30 MG/ML
30 INJECTION, SOLUTION INTRAMUSCULAR; INTRAVENOUS ONCE
Status: COMPLETED | OUTPATIENT
Start: 2018-10-20 | End: 2018-10-20

## 2018-10-20 RX ADMIN — OXYCODONE HYDROCHLORIDE AND ACETAMINOPHEN 1 TABLET: 5; 325 TABLET ORAL at 08:17

## 2018-10-20 RX ADMIN — KETOROLAC TROMETHAMINE 30 MG: 30 INJECTION, SOLUTION INTRAMUSCULAR at 10:00

## 2018-10-20 RX ADMIN — OXYCODONE AND ACETAMINOPHEN 1 TABLET: 5; 325 TABLET ORAL at 10:00

## 2018-10-20 NOTE — DISCHARGE INSTRUCTIONS
Rest.  Take over the counter Aleve as directed for up to one week.  Ultram as prescribed for more severe pain.  Follow up with your PCP (call for appointment).  Return to ER if worse.

## 2018-10-20 NOTE — ED PROVIDER NOTES
Subjective   83-year-old male presents to the emergency department with severe posterior neck pain that began 2 days ago.  The patient states that he thinks maybe he lifted something heavy like garbage or a container of water that he strained his neck.  He states the pain started gradual and got to the point where he is unable to turn his head without severe pain.  The pain radiates into both shoulders.  He states he had a similar episode of this last year after lifting something heavy.  He states that just riding in the car on the way here caused severe pain with any bumps in the road.  No headache.  No chest pain.  Past medical history of type 2 diabetes, worsening heart valve, sleep apnea, hypertension, prostatectomy secondary to prostate cancer.  He is a nonsmoker.  He denies any alcohol use.  His PCP is Maria Fernanda Wagner.            Review of Systems   Constitutional: Negative for chills and fever.   HENT: Negative for ear pain and sore throat.    Eyes: Negative for visual disturbance.   Respiratory: Negative for cough and shortness of breath.    Cardiovascular: Negative for chest pain.   Gastrointestinal: Negative for abdominal pain, nausea and vomiting.   Endocrine: Negative for polydipsia, polyphagia and polyuria.   Genitourinary: Negative for dysuria.   Musculoskeletal: Positive for neck pain.   Skin: Negative for wound.   Allergic/Immunologic: Negative for immunocompromised state.   Neurological: Negative for weakness, numbness and headaches.   Hematological: Negative for adenopathy.   Psychiatric/Behavioral: Negative.        Past Medical History:   Diagnosis Date   • Aortic stenosis     status post ROSS procedure, remote, with December 2015 echocardiography revealing normal aortic and pulmonary valve function, normal ejection fraction and mild to moderate MR   • Arthritis    • Depression    • Diabetes mellitus (CMS/HCC)    • Diverticulitis    • Exogenous obesity    • Gout    • Heart murmur    • History of  vitamin D deficiency    • Hyperlipidemia    • Hypertension    • Malignant neoplasm of prostate (CMS/HCC)    • Sleep apnea    • Vertigo        Allergies   Allergen Reactions   • Ampicillin Anaphylaxis   • Medrol [Methylprednisolone] Other (See Comments)     Made his blood sugar get really high, can't take this   • Penicillins Anaphylaxis   • Bee Venom Swelling   • Myrbetriq [Mirabegron] Other (See Comments)     High BP       Past Surgical History:   Procedure Laterality Date   • CARDIAC VALVE REPLACEMENT     • CATARACT EXTRACTION Bilateral    • COLON SURGERY     • COLONOSCOPY     • COLOSTOMY  1999   • COLOSTOMY REVISION     • EXPLORATORY LAPAROTOMY      With Tissue Removal   • LIPOMA EXCISION     • OTHER SURGICAL HISTORY      Porcine Valve   • PROSTATE SURGERY     • PROSTATECTOMY  2000     History of Prostatectomy Perineal Radical   • SKIN CANCER EXCISION      from head and lip   • TONSILLECTOMY         Family History   Problem Relation Age of Onset   • Diabetes Mother    • Lung cancer Mother    • Cancer Mother    • Heart disease Father    • Breast cancer Other    • Cancer Other    • Hypertension Other    • Migraines Other    • Obesity Other    • Diabetes Other        Social History     Social History   • Marital status: Single     Social History Main Topics   • Smoking status: Never Smoker   • Smokeless tobacco: Never Used   • Alcohol use No   • Drug use: No   • Sexual activity: Defer     Other Topics Concern   • Not on file           Objective   Physical Exam   Constitutional: He is oriented to person, place, and time. He appears well-developed and well-nourished. He appears distressed.   HENT:   Head: Normocephalic.   Mouth/Throat: Oropharynx is clear and moist.   Eyes: Pupils are equal, round, and reactive to light.   Neck:   Pt unable to turn head more than about an inch without severe posterior neck pain.  Unable to look upward or downward due to pain in neck.  Moderate tenderness on palpation of posterior neck  "and paravertebral muscles.   Cardiovascular: Normal rate, regular rhythm, normal heart sounds and intact distal pulses.    Pulmonary/Chest: Effort normal and breath sounds normal.   Abdominal: Soft. Bowel sounds are normal. There is no tenderness.   Musculoskeletal:   Unable to turn head due to neck pain as described above.   Neurological: He is alert and oriented to person, place, and time. No sensory deficit.   Skin: Skin is warm and dry.   Psychiatric: He has a normal mood and affect.       Procedures           ED Course      9:24 AM  CT cervical spine shows degenerative d/z with moderate spinal stenosis C5-C6 and leftward nerve root stenosis at this level.  No fracture or subluxation.  I spoke with pt about his CT results.  We discussed options of medications such as Medrol, but he states he took steroids before and his blood sugar \"shot fely high\".  He has been told to avoid NSAIDS but a recent creatinine last month was 1.0.  I think a short course of Aleve would be ok.  I will also write for Ultram and recommend that he follow up with his PCP this week.  No results found for this or any previous visit (from the past 24 hour(s)).  Note: In addition to lab results from this visit, the labs listed above may include labs taken at another facility or during a different encounter within the last 24 hours. Please correlate lab times with ED admission and discharge times for further clarification of the services performed during this visit.    CT Cervical Spine Without Contrast    (Results Pending)     Vitals:    10/20/18 0707   BP: 150/71   Pulse: 78   Resp: 20   Temp: 97.7 °F (36.5 °C)   TempSrc: Oral   SpO2: 96%   Weight: 93 kg (205 lb)   Height: 167.6 cm (66\")     Medications   oxyCODONE-acetaminophen (PERCOCET) 5-325 MG per tablet 1 tablet (1 tablet Oral Given 10/20/18 0817)     ECG/EMG Results (last 24 hours)     ** No results found for the last 24 hours. **                [facility]      Final diagnoses:   Torticollis, " acute   Spondylosis of cervical region without myelopathy or radiculopathy            Dain Bashir, PA  10/20/18 0936

## 2018-11-10 RX ORDER — PRAVASTATIN SODIUM 40 MG
TABLET ORAL
Qty: 30 TABLET | Refills: 4 | Status: SHIPPED | OUTPATIENT
Start: 2018-11-10 | End: 2019-04-15 | Stop reason: SDUPTHER

## 2018-11-19 RX ORDER — METOPROLOL SUCCINATE 100 MG/1
TABLET, EXTENDED RELEASE ORAL
Qty: 30 TABLET | Refills: 0 | Status: SHIPPED | OUTPATIENT
Start: 2018-11-19 | End: 2018-12-23 | Stop reason: SDUPTHER

## 2018-11-26 RX ORDER — ALLOPURINOL 300 MG/1
TABLET ORAL
Qty: 30 TABLET | Refills: 4 | Status: SHIPPED | OUTPATIENT
Start: 2018-11-26 | End: 2019-06-26 | Stop reason: SDUPTHER

## 2018-12-03 RX ORDER — TRIAMTERENE AND HYDROCHLOROTHIAZIDE 37.5; 25 MG/1; MG/1
CAPSULE ORAL
Qty: 30 CAPSULE | Refills: 4 | Status: SHIPPED | OUTPATIENT
Start: 2018-12-03 | End: 2019-07-11 | Stop reason: SDUPTHER

## 2018-12-03 RX ORDER — SITAGLIPTIN 100 MG/1
TABLET, FILM COATED ORAL
Qty: 30 TABLET | Refills: 4 | Status: SHIPPED | OUTPATIENT
Start: 2018-12-03 | End: 2019-06-28 | Stop reason: SDUPTHER

## 2018-12-19 ENCOUNTER — INFUSION (OUTPATIENT)
Dept: ONCOLOGY | Facility: HOSPITAL | Age: 83
End: 2018-12-19

## 2018-12-19 ENCOUNTER — OFFICE VISIT (OUTPATIENT)
Dept: ONCOLOGY | Facility: CLINIC | Age: 83
End: 2018-12-19

## 2018-12-19 ENCOUNTER — LAB (OUTPATIENT)
Dept: LAB | Facility: HOSPITAL | Age: 83
End: 2018-12-19

## 2018-12-19 VITALS
SYSTOLIC BLOOD PRESSURE: 147 MMHG | TEMPERATURE: 97 F | HEIGHT: 66 IN | HEART RATE: 80 BPM | BODY MASS INDEX: 33.27 KG/M2 | WEIGHT: 207 LBS | RESPIRATION RATE: 17 BRPM | DIASTOLIC BLOOD PRESSURE: 70 MMHG

## 2018-12-19 DIAGNOSIS — C61 PROSTATE CANCER (HCC): ICD-10-CM

## 2018-12-19 DIAGNOSIS — C61 PROSTATE CANCER (HCC): Primary | ICD-10-CM

## 2018-12-19 LAB
ALBUMIN SERPL-MCNC: 4.9 G/DL (ref 3.5–5)
ALBUMIN/GLOB SERPL: 1.7 G/DL (ref 1–2)
ALP SERPL-CCNC: 38 U/L (ref 38–126)
ALT SERPL W P-5'-P-CCNC: 65 U/L (ref 13–69)
ANION GAP SERPL CALCULATED.3IONS-SCNC: 13 MMOL/L (ref 10–20)
AST SERPL-CCNC: 43 U/L (ref 15–46)
BASOPHILS # BLD AUTO: 0.05 10*3/MM3 (ref 0–0.2)
BASOPHILS NFR BLD AUTO: 0.8 % (ref 0–2.5)
BILIRUB SERPL-MCNC: 0.8 MG/DL (ref 0.2–1.3)
BUN BLD-MCNC: 28 MG/DL (ref 7–20)
BUN/CREAT SERPL: 21.5 (ref 6.3–21.9)
CALCIUM SPEC-SCNC: 10 MG/DL (ref 8.4–10.2)
CHLORIDE SERPL-SCNC: 102 MMOL/L (ref 98–107)
CO2 SERPL-SCNC: 28 MMOL/L (ref 26–30)
CREAT BLD-MCNC: 1.3 MG/DL (ref 0.6–1.3)
DEPRECATED RDW RBC AUTO: 47.2 FL (ref 37–54)
EOSINOPHIL # BLD AUTO: 0.28 10*3/MM3 (ref 0–0.7)
EOSINOPHIL NFR BLD AUTO: 4.8 % (ref 0–7)
ERYTHROCYTE [DISTWIDTH] IN BLOOD BY AUTOMATED COUNT: 13.9 % (ref 11.5–14.5)
GFR SERPL CREATININE-BSD FRML MDRD: 53 ML/MIN/1.73
GLOBULIN UR ELPH-MCNC: 2.9 GM/DL
GLUCOSE BLD-MCNC: 191 MG/DL (ref 74–98)
HCT VFR BLD AUTO: 44.5 % (ref 42–52)
HGB BLD-MCNC: 14.4 G/DL (ref 14–18)
IMM GRANULOCYTES # BLD: 0.03 10*3/MM3 (ref 0–0.06)
IMM GRANULOCYTES NFR BLD: 0.5 % (ref 0–0.6)
LYMPHOCYTES # BLD AUTO: 1.36 10*3/MM3 (ref 0.6–3.4)
LYMPHOCYTES NFR BLD AUTO: 23.1 % (ref 10–50)
MAGNESIUM SERPL-MCNC: 1.9 MG/DL (ref 1.6–2.3)
MCH RBC QN AUTO: 29.9 PG (ref 27–31)
MCHC RBC AUTO-ENTMCNC: 32.4 G/DL (ref 30–37)
MCV RBC AUTO: 92.3 FL (ref 80–94)
MONOCYTES # BLD AUTO: 0.53 10*3/MM3 (ref 0–0.9)
MONOCYTES NFR BLD AUTO: 9 % (ref 0–12)
NEUTROPHILS # BLD AUTO: 3.64 10*3/MM3 (ref 2–6.9)
NEUTROPHILS NFR BLD AUTO: 61.8 % (ref 37–80)
NRBC BLD MANUAL-RTO: 0 /100 WBC (ref 0–0)
PHOSPHATE SERPL-MCNC: 4.7 MG/DL (ref 2.5–4.5)
PLATELET # BLD AUTO: 191 10*3/MM3 (ref 130–400)
PMV BLD AUTO: 10.1 FL (ref 6–12)
POTASSIUM BLD-SCNC: 4 MMOL/L (ref 3.5–5.1)
PROT SERPL-MCNC: 7.8 G/DL (ref 6.3–8.2)
PSA SERPL-MCNC: <0.064 NG/ML (ref 0.06–4)
RBC # BLD AUTO: 4.82 10*6/MM3 (ref 4.7–6.1)
SODIUM BLD-SCNC: 139 MMOL/L (ref 137–145)
WBC NRBC COR # BLD: 5.89 10*3/MM3 (ref 4.8–10.8)

## 2018-12-19 PROCEDURE — 25010000002 LEUPROLIDE 22.5 MG KIT: Performed by: INTERNAL MEDICINE

## 2018-12-19 PROCEDURE — 84100 ASSAY OF PHOSPHORUS: CPT

## 2018-12-19 PROCEDURE — 96372 THER/PROPH/DIAG INJ SC/IM: CPT

## 2018-12-19 PROCEDURE — 80053 COMPREHEN METABOLIC PANEL: CPT

## 2018-12-19 PROCEDURE — 25010000002 DENOSUMAB 60 MG/ML SOLUTION: Performed by: NURSE PRACTITIONER

## 2018-12-19 PROCEDURE — 99214 OFFICE O/P EST MOD 30 MIN: CPT | Performed by: INTERNAL MEDICINE

## 2018-12-19 PROCEDURE — 83735 ASSAY OF MAGNESIUM: CPT

## 2018-12-19 PROCEDURE — 84153 ASSAY OF PSA TOTAL: CPT

## 2018-12-19 PROCEDURE — 36415 COLL VENOUS BLD VENIPUNCTURE: CPT

## 2018-12-19 PROCEDURE — 96402 CHEMO HORMON ANTINEOPL SQ/IM: CPT

## 2018-12-19 PROCEDURE — 85025 COMPLETE CBC W/AUTO DIFF WBC: CPT

## 2018-12-19 RX ORDER — GLIMEPIRIDE 4 MG/1
4 TABLET ORAL DAILY
Refills: 5 | COMMUNITY
Start: 2018-12-11 | End: 2019-11-04

## 2018-12-19 RX ADMIN — LEUPROLIDE ACETATE 22.5 MG: KIT SUBCUTANEOUS at 15:34

## 2018-12-19 RX ADMIN — DENOSUMAB 60 MG: 60 INJECTION SUBCUTANEOUS at 15:26

## 2018-12-19 NOTE — PROGRESS NOTES
DATE OF VISIT: 12/19/2018     REASON FOR VISIT:   1. Prostate cancer. Presented with stage I, [id number], PSA at diagnosis 3 status  post prostatectomy in 2000.  2. Relapsed disease with rise in PSA gradually over the last couple of years,  most recent PSA 7.1 ng/mL on 01/25/2016.      HISTORY OF PRESENT ILLNESS: The patient is a very pleasant 80-year-old  gentleman with past medical history significant for prostate cancer status post  radical prostatectomy in 2000. The patient never received adjuvant treatments,  neither radiation, GnRH or antiandrogen treatment. The patient's PSA started  to go up gradually over the last 2 years. Patient was referred to me on  06/13/2014 and I did restaging workup that came back negative including bone  scan, CAT scans chest, abdomen and pelvis. We elected to proceed with watchful  waiting. Patient is here today in scheduled followup visit.     SUBJECTIVE: Mr. Dietz is here today for follow-up visit.  All in all he is doing fairly well.  No side effects from the shots.  He denied any weight loss.  No abdominal pain.     REVIEW OF SYSTEMS: All the other 9 systems are reviewed by me and negative  except what is mentioned in HPI and subjective.      PAST MEDICAL HISTORY/SOCIAL HISTORY/FAMILY HISTORY: Unchanged from my prior  documentation on 06/13/2014.       Current Outpatient Medications:   •  allopurinol (ZYLOPRIM) 300 MG tablet, TAKE ONE TABLET BY MOUTH DAILY, Disp: 30 tablet, Rfl: 4  •  ascorbic acid (VITAMIN C) 1000 MG tablet, Take 1,000 mg by mouth Daily., Disp: , Rfl:   •  aspirin 81 MG EC tablet, Take 81 mg by mouth Daily., Disp: , Rfl:   •  B Complex Vitamins (VITAMIN B COMPLEX PO), Take  by mouth Daily., Disp: , Rfl:   •  CRISTINA CONTOUR TEST test strip, , Disp: , Rfl:   •  Cholecalciferol (VITAMIN D3) 2000 UNITS capsule, Take  by mouth 2 (two) times a day., Disp: , Rfl:   •  Cinnamon 500 MG capsule, Take 2,000 mg by mouth Daily., Disp: , Rfl:   •  clindamycin (CLEOCIN) 300 MG  "capsule, TAKE 1 CAPSULE BY MOUTH THREE TIMES A DAY UNTIL GONE, Disp: , Rfl: 0  •  Coenzyme Q10 100 MG tablet, Take 2 tablets by mouth Daily., Disp: , Rfl:   •  fenofibric acid (TRILIPIX) 135 MG capsule delayed-release delayed release capsule, TAKE ONE CAPSULE BY MOUTH DAILY, Disp: 30 capsule, Rfl: 2  •  FLORASTOR 250 MG capsule, TAKE ONE CAPSULE BY MOUTH TWICE A DAY, Disp: 60 capsule, Rfl: 3  •  folic acid (FOLVITE) 400 MCG tablet, Take  by mouth 1 (one) time per week., Disp: , Rfl:   •  glimepiride (AMARYL) 4 MG tablet, Take 4 mg by mouth Daily. 200001, Disp: , Rfl: 5  •  hydrochlorothiazide (HYDRODIURIL) 25 MG tablet, Take 25 mg by mouth Daily., Disp: , Rfl:   •  JANUVIA 100 MG tablet, TAKE ONE TABLET BY MOUTH DAILY, Disp: 30 tablet, Rfl: 4  •  JARDIANCE 10 MG tablet, 10 mg Daily., Disp: , Rfl:   •  l-methylfolate-algae-B6-B12 (METANX) 3-90.314-2-35 MG capsule capsule, Take 1 capsule by mouth 2 (Two) Times a Day., Disp: 180 capsule, Rfl: 0  •  Lecithin 1200 MG capsule, Take  by mouth Daily., Disp: , Rfl:   •  metFORMIN (GLUCOPHAGE) 500 MG tablet, Take 2 tablets by mouth 2 (Two) Times a Day With Meals., Disp: 120 tablet, Rfl: 2  •  metoprolol succinate XL (TOPROL-XL) 100 MG 24 hr tablet, TAKE 1 TABLET BY MOUTH ONE TIME A DAY , Disp: 30 tablet, Rfl: 0  •  Omega-3 Fatty Acids (FISH OIL) 1000 MG capsule capsule, Take 1,000 mg by mouth As Needed., Disp: , Rfl:   •  Omega-3 Fatty Acids (OMEGA-3 CF PO), Take  by mouth Daily., Disp: , Rfl:   •  pravastatin (PRAVACHOL) 40 MG tablet, TAKE ONE TABLET BY MOUTH EVERY NIGHT AT BEDTIME, Disp: 30 tablet, Rfl: 4  •  triamterene-hydrochlorothiazide (DYAZIDE) 37.5-25 MG per capsule, TAKE ONE CAPSULE BY MOUTH DAILY, Disp: 30 capsule, Rfl: 4  •  vitamin E 1000 UNIT capsule, Take 1,000 Units by mouth Daily., Disp: , Rfl:     PHYSICAL EXAMINATION:   /70   Pulse 80   Temp 97 °F (36.1 °C) (Temporal)   Resp 17   Ht 167.6 cm (66\")   Wt 93.9 kg (207 lb)   BMI 33.41 kg/m² "   ECOG1  GENERAL: Age appropriate. No acute distress.   HEENT: Head atraumatic, normocephalic.   NECK: Supple. No JVD. No lymphadenopathy.   LUNGS: Clear to auscultation bilaterally. No wheezing. No rhonchi.   HEART: Regular rate and rhythm. S1, S2, no murmurs.   ABDOMEN: Soft, nontender, nondistended. Bowel sounds positive. No  hepatosplenomegaly.   EXTREMITIES: No clubbing, cyanosis, or edema.   SKIN: No rashes. No purpura.   NEUROLOGIC: Awake and oriented x3. Strength 5 out of 5 in all muscle groups.     Lab on 12/19/2018   Component Date Value Ref Range Status   • WBC 12/19/2018 5.89  4.80 - 10.80 10*3/mm3 Final   • RBC 12/19/2018 4.82  4.70 - 6.10 10*6/mm3 Final   • Hemoglobin 12/19/2018 14.4  14.0 - 18.0 g/dL Final   • Hematocrit 12/19/2018 44.5  42.0 - 52.0 % Final   • MCV 12/19/2018 92.3  80.0 - 94.0 fL Final   • MCH 12/19/2018 29.9  27.0 - 31.0 pg Final   • MCHC 12/19/2018 32.4  30.0 - 37.0 g/dL Final   • RDW 12/19/2018 13.9  11.5 - 14.5 % Final   • RDW-SD 12/19/2018 47.2  37.0 - 54.0 fl Final   • MPV 12/19/2018 10.1  6.0 - 12.0 fL Final   • Platelets 12/19/2018 191  130 - 400 10*3/mm3 Final   • Neutrophil % 12/19/2018 61.8  37.0 - 80.0 % Final   • Lymphocyte % 12/19/2018 23.1  10.0 - 50.0 % Final   • Monocyte % 12/19/2018 9.0  0.0 - 12.0 % Final   • Eosinophil % 12/19/2018 4.8  0.0 - 7.0 % Final   • Basophil % 12/19/2018 0.8  0.0 - 2.5 % Final   • Immature Grans % 12/19/2018 0.5  0.0 - 0.6 % Final   • Neutrophils, Absolute 12/19/2018 3.64  2.00 - 6.90 10*3/mm3 Final   • Lymphocytes, Absolute 12/19/2018 1.36  0.60 - 3.40 10*3/mm3 Final   • Monocytes, Absolute 12/19/2018 0.53  0.00 - 0.90 10*3/mm3 Final   • Eosinophils, Absolute 12/19/2018 0.28  0.00 - 0.70 10*3/mm3 Final   • Basophils, Absolute 12/19/2018 0.05  0.00 - 0.20 10*3/mm3 Final   • Immature Grans, Absolute 12/19/2018 0.03  0.00 - 0.06 10*3/mm3 Final   • nRBC 12/19/2018 0.0  0.0 - 0.0 /100 WBC Final        ASSESSMENT: The patient is a very pleasant  80-year-old gentleman with relapsed  prostate cancer.     PROBLEM LIST:  1. Prostate cancer, initially presented as [id number] status post radical  prostatectomy 2000.  2. Rise in PSA gradually over the last 2 years,   3. Multiple comorbidities including type 2 diabetes, hypertension, morbid  obesity, osteoarthritis, and hypercholesterolemia.  4. Erectile dysfunction.   5.  Family history of colon cancer  6.  Osteopenia     PLAN:  1. I did go over the results in detail with the patient. I explained to him that  his last PSA is down to <0.064.   I will continue Eligard 22.5mg q3 month.   2. The patient will come back to see me in 3 months with repeat blood work as well as serum PSA.   3. I will continue Viagra as needed for erectile dysfunction.   4.  We'll continue Januvia as well as metformin for type 2 diabetes.  His sugars were 171 yesterday.  5.  We'll continue pravastatin for hypercholesterolemia  6.  Patient need to have 5 year follow-up colonoscopy which would be due 11/2021.  7. We reviewed the potential side effects of the treatment including hot flashes, impotence, joint pain, decrease in bone density, mood or sleep disturbance, and asthenia.  8.  I'll continue the patient on Prolia 60 mg subcutaneous every 6 month.  His next dose will be due today.  We'll continue calcium with vitamin D daily.   Shannan Ramon MD  12/19/2018

## 2018-12-23 RX ORDER — FENOFIBRIC ACID 135 MG/1
CAPSULE, DELAYED RELEASE ORAL
Qty: 30 CAPSULE | Refills: 2 | Status: SHIPPED | OUTPATIENT
Start: 2018-12-23 | End: 2019-04-02 | Stop reason: SDUPTHER

## 2018-12-23 RX ORDER — METOPROLOL SUCCINATE 100 MG/1
TABLET, EXTENDED RELEASE ORAL
Qty: 30 TABLET | Refills: 0 | Status: SHIPPED | OUTPATIENT
Start: 2018-12-23 | End: 2019-02-05 | Stop reason: SDUPTHER

## 2019-02-06 RX ORDER — METOPROLOL SUCCINATE 100 MG/1
TABLET, EXTENDED RELEASE ORAL
Qty: 30 TABLET | Refills: 2 | Status: SHIPPED | OUTPATIENT
Start: 2019-02-06 | End: 2019-06-18 | Stop reason: SDUPTHER

## 2019-02-20 ENCOUNTER — TELEPHONE (OUTPATIENT)
Dept: INTERNAL MEDICINE | Facility: CLINIC | Age: 84
End: 2019-02-20

## 2019-02-20 NOTE — TELEPHONE ENCOUNTER
PT WANTED TO KNOW IF DR RAMÍREZ WOULD CALL THIS IN FOR PT SO HE WON'T CRY ON Friday  [facility]  PT USES [facility] IN [place]  [phone]

## 2019-03-20 ENCOUNTER — INFUSION (OUTPATIENT)
Dept: ONCOLOGY | Facility: HOSPITAL | Age: 84
End: 2019-03-20

## 2019-03-20 ENCOUNTER — OFFICE VISIT (OUTPATIENT)
Dept: ONCOLOGY | Facility: CLINIC | Age: 84
End: 2019-03-20

## 2019-03-20 VITALS
HEART RATE: 80 BPM | BODY MASS INDEX: 32.78 KG/M2 | WEIGHT: 204 LBS | RESPIRATION RATE: 18 BRPM | HEIGHT: 66 IN | DIASTOLIC BLOOD PRESSURE: 70 MMHG | SYSTOLIC BLOOD PRESSURE: 150 MMHG | TEMPERATURE: 98.1 F

## 2019-03-20 DIAGNOSIS — C61 PROSTATE CANCER (HCC): Primary | ICD-10-CM

## 2019-03-20 DIAGNOSIS — C61 PROSTATE CANCER (HCC): ICD-10-CM

## 2019-03-20 LAB
ALBUMIN SERPL-MCNC: 4.5 G/DL (ref 3.5–5)
ALBUMIN/GLOB SERPL: 1.6 G/DL (ref 1–2)
ALP SERPL-CCNC: 38 U/L (ref 38–126)
ALT SERPL W P-5'-P-CCNC: 44 U/L (ref 13–69)
ANION GAP SERPL CALCULATED.3IONS-SCNC: 14.9 MMOL/L (ref 10–20)
AST SERPL-CCNC: 39 U/L (ref 15–46)
BASOPHILS # BLD AUTO: 0.04 10*3/MM3 (ref 0–0.2)
BASOPHILS NFR BLD AUTO: 0.7 % (ref 0–2.5)
BILIRUB SERPL-MCNC: 0.7 MG/DL (ref 0.2–1.3)
BUN BLD-MCNC: 24 MG/DL (ref 7–20)
BUN/CREAT SERPL: 18.5 (ref 6.3–21.9)
CALCIUM SPEC-SCNC: 10 MG/DL (ref 8.4–10.2)
CHLORIDE SERPL-SCNC: 102 MMOL/L (ref 98–107)
CO2 SERPL-SCNC: 26 MMOL/L (ref 26–30)
CREAT BLD-MCNC: 1.3 MG/DL (ref 0.6–1.3)
DEPRECATED RDW RBC AUTO: 44.4 FL (ref 37–54)
EOSINOPHIL # BLD AUTO: 0.33 10*3/MM3 (ref 0–0.7)
EOSINOPHIL NFR BLD AUTO: 5.5 % (ref 0–7)
ERYTHROCYTE [DISTWIDTH] IN BLOOD BY AUTOMATED COUNT: 13.2 % (ref 11.5–14.5)
GFR SERPL CREATININE-BSD FRML MDRD: 53 ML/MIN/1.73
GLOBULIN UR ELPH-MCNC: 2.8 GM/DL
GLUCOSE BLD-MCNC: 281 MG/DL (ref 74–98)
HCT VFR BLD AUTO: 42.9 % (ref 42–52)
HGB BLD-MCNC: 14.4 G/DL (ref 14–18)
IMM GRANULOCYTES # BLD AUTO: 0.04 10*3/MM3 (ref 0–0.06)
IMM GRANULOCYTES NFR BLD AUTO: 0.7 % (ref 0–0.6)
LYMPHOCYTES # BLD AUTO: 1.23 10*3/MM3 (ref 0.6–3.4)
LYMPHOCYTES NFR BLD AUTO: 20.6 % (ref 10–50)
MCH RBC QN AUTO: 30.6 PG (ref 27–31)
MCHC RBC AUTO-ENTMCNC: 33.6 G/DL (ref 30–37)
MCV RBC AUTO: 91.1 FL (ref 80–94)
MONOCYTES # BLD AUTO: 0.54 10*3/MM3 (ref 0–0.9)
MONOCYTES NFR BLD AUTO: 9.1 % (ref 0–12)
NEUTROPHILS # BLD AUTO: 3.78 10*3/MM3 (ref 2–6.9)
NEUTROPHILS NFR BLD AUTO: 63.4 % (ref 37–80)
NRBC BLD AUTO-RTO: 0 /100 WBC (ref 0–0)
PLATELET # BLD AUTO: 166 10*3/MM3 (ref 130–400)
PMV BLD AUTO: 10 FL (ref 6–12)
POTASSIUM BLD-SCNC: 3.9 MMOL/L (ref 3.5–5.1)
PROT SERPL-MCNC: 7.3 G/DL (ref 6.3–8.2)
PSA SERPL-MCNC: <0.064 NG/ML (ref 0.06–4)
RBC # BLD AUTO: 4.71 10*6/MM3 (ref 4.7–6.1)
SODIUM BLD-SCNC: 139 MMOL/L (ref 137–145)
WBC NRBC COR # BLD: 5.96 10*3/MM3 (ref 4.8–10.8)

## 2019-03-20 PROCEDURE — 80053 COMPREHEN METABOLIC PANEL: CPT

## 2019-03-20 PROCEDURE — 85025 COMPLETE CBC W/AUTO DIFF WBC: CPT

## 2019-03-20 PROCEDURE — 36415 COLL VENOUS BLD VENIPUNCTURE: CPT

## 2019-03-20 PROCEDURE — 84153 ASSAY OF PSA TOTAL: CPT

## 2019-03-20 PROCEDURE — 99214 OFFICE O/P EST MOD 30 MIN: CPT | Performed by: INTERNAL MEDICINE

## 2019-03-20 PROCEDURE — 25010000002 LEUPROLIDE 22.5 MG KIT: Performed by: INTERNAL MEDICINE

## 2019-03-20 PROCEDURE — 96402 CHEMO HORMON ANTINEOPL SQ/IM: CPT

## 2019-03-20 RX ADMIN — LEUPROLIDE ACETATE 22.5 MG: KIT SUBCUTANEOUS at 14:35

## 2019-03-20 NOTE — PROGRESS NOTES
DATE OF VISIT: 3/20/2019     REASON FOR VISIT:   1. Prostate cancer. Presented with stage I, [id number], PSA at diagnosis 3 status  post prostatectomy in 2000.  2. Relapsed disease with rise in PSA gradually over the last couple of years,  most recent PSA 7.1 ng/mL on 01/25/2016.      HISTORY OF PRESENT ILLNESS: The patient is a very pleasant 80-year-old  gentleman with past medical history significant for prostate cancer status post  radical prostatectomy in 2000. The patient never received adjuvant treatments,  neither radiation, GnRH or antiandrogen treatment. The patient's PSA started  to go up gradually over the last 2 years. Patient was referred to me on  06/13/2014 and I did restaging workup that came back negative including bone  scan, CAT scans chest, abdomen and pelvis. We elected to proceed with watchful  waiting. Patient is here today in scheduled followup visit.     SUBJECTIVE: Mr. Dietz is here today for follow-up visit.  Since his last visit he had lost his ex-wife.  He is grieving appropriately, he has mild depression, no suicidal ideation.  No side effects from the shots.  He denied any weight loss.  No abdominal pain.     REVIEW OF SYSTEMS: All the other 9 systems are reviewed by me and negative  except what is mentioned in HPI and subjective.      PAST MEDICAL HISTORY/SOCIAL HISTORY/FAMILY HISTORY: Unchanged from my prior  documentation on 06/13/2014.       Current Outpatient Medications:   •  allopurinol (ZYLOPRIM) 300 MG tablet, TAKE ONE TABLET BY MOUTH DAILY, Disp: 30 tablet, Rfl: 4  •  ascorbic acid (VITAMIN C) 1000 MG tablet, Take 1,000 mg by mouth Daily., Disp: , Rfl:   •  aspirin 81 MG EC tablet, Take 81 mg by mouth Daily., Disp: , Rfl:   •  B Complex Vitamins (VITAMIN B COMPLEX PO), Take  by mouth Daily., Disp: , Rfl:   •  CRISTINA CONTOUR TEST test strip, , Disp: , Rfl:   •  Cholecalciferol (VITAMIN D3) 2000 UNITS capsule, Take  by mouth 2 (two) times a day., Disp: , Rfl:   •  Cinnamon 500 MG  capsule, Take 2,000 mg by mouth Daily., Disp: , Rfl:   •  clindamycin (CLEOCIN) 300 MG capsule, TAKE 1 CAPSULE BY MOUTH THREE TIMES A DAY UNTIL GONE, Disp: , Rfl: 0  •  Coenzyme Q10 100 MG tablet, Take 2 tablets by mouth Daily., Disp: , Rfl:   •  fenofibric acid (TRILIPIX) 135 MG capsule delayed-release delayed release capsule, TAKE ONE CAPSULE BY MOUTH DAILY, Disp: 30 capsule, Rfl: 2  •  FLORASTOR 250 MG capsule, TAKE ONE CAPSULE BY MOUTH TWICE A DAY, Disp: 60 capsule, Rfl: 3  •  folic acid (FOLVITE) 400 MCG tablet, Take  by mouth 1 (one) time per week., Disp: , Rfl:   •  glimepiride (AMARYL) 4 MG tablet, Take 4 mg by mouth Daily. 200001, Disp: , Rfl: 5  •  hydrochlorothiazide (HYDRODIURIL) 25 MG tablet, Take 25 mg by mouth Daily., Disp: , Rfl:   •  JANUVIA 100 MG tablet, TAKE ONE TABLET BY MOUTH DAILY, Disp: 30 tablet, Rfl: 4  •  JARDIANCE 10 MG tablet, 10 mg Daily., Disp: , Rfl:   •  l-methylfolate-algae-B6-B12 (METANX) 3-90.314-2-35 MG capsule capsule, Take 1 capsule by mouth 2 (Two) Times a Day., Disp: 180 capsule, Rfl: 0  •  Lecithin 1200 MG capsule, Take  by mouth Daily., Disp: , Rfl:   •  metFORMIN (GLUCOPHAGE) 500 MG tablet, Take 2 tablets by mouth 2 (Two) Times a Day With Meals., Disp: 120 tablet, Rfl: 2  •  metoprolol succinate XL (TOPROL-XL) 100 MG 24 hr tablet, TAKE 1 TABLET BY MOUTH ONE TIME A DAY , Disp: 30 tablet, Rfl: 2  •  Omega-3 Fatty Acids (FISH OIL) 1000 MG capsule capsule, Take 1,000 mg by mouth As Needed., Disp: , Rfl:   •  Omega-3 Fatty Acids (OMEGA-3 CF PO), Take  by mouth Daily., Disp: , Rfl:   •  pravastatin (PRAVACHOL) 40 MG tablet, TAKE ONE TABLET BY MOUTH EVERY NIGHT AT BEDTIME, Disp: 30 tablet, Rfl: 4  •  triamterene-hydrochlorothiazide (DYAZIDE) 37.5-25 MG per capsule, TAKE ONE CAPSULE BY MOUTH DAILY, Disp: 30 capsule, Rfl: 4  •  vitamin E 1000 UNIT capsule, Take 1,000 Units by mouth Daily., Disp: , Rfl:     PHYSICAL EXAMINATION:   /70   Pulse 80   Temp 98.1 °F (36.7 °C)  "(Temporal)   Resp 18   Ht 167.6 cm (66\")   Wt 92.5 kg (204 lb)   BMI 32.93 kg/m²   ECOG1  GENERAL: Age appropriate. No acute distress.   HEENT: Head atraumatic, normocephalic.   NECK: Supple. No JVD. No lymphadenopathy.   LUNGS: Clear to auscultation bilaterally. No wheezing. No rhonchi.   HEART: Regular rate and rhythm. S1, S2, no murmurs.   ABDOMEN: Soft, nontender, nondistended. Bowel sounds positive. No  hepatosplenomegaly.   EXTREMITIES: No clubbing, cyanosis, or edema.   SKIN: No rashes. No purpura.   NEUROLOGIC: Awake and oriented x3. Strength 5 out of 5 in all muscle groups.     No visits with results within 2 Week(s) from this visit.   Latest known visit with results is:   Lab on 12/19/2018   Component Date Value Ref Range Status   • PSA 12/19/2018 <0.064  0.060 - 4.000 ng/mL Final   • Glucose 12/19/2018 191* 74 - 98 mg/dL Final    Glucose >180, Hemoglobin A1C recommended.   • BUN 12/19/2018 28* 7 - 20 mg/dL Final   • Creatinine 12/19/2018 1.30  0.60 - 1.30 mg/dL Final   • Sodium 12/19/2018 139  137 - 145 mmol/L Final   • Potassium 12/19/2018 4.0  3.5 - 5.1 mmol/L Final   • Chloride 12/19/2018 102  98 - 107 mmol/L Final   • CO2 12/19/2018 28.0  26.0 - 30.0 mmol/L Final   • Calcium 12/19/2018 10.0  8.4 - 10.2 mg/dL Final   • Total Protein 12/19/2018 7.8  6.3 - 8.2 g/dL Final   • Albumin 12/19/2018 4.90  3.50 - 5.00 g/dL Final   • ALT (SGPT) 12/19/2018 65  13 - 69 U/L Final   • AST (SGOT) 12/19/2018 43  15 - 46 U/L Final   • Alkaline Phosphatase 12/19/2018 38  38 - 126 U/L Final   • Total Bilirubin 12/19/2018 0.8  0.2 - 1.3 mg/dL Final   • eGFR Non African Amer 12/19/2018 53* >60 mL/min/1.73 Final   • Globulin 12/19/2018 2.9  gm/dL Final   • A/G Ratio 12/19/2018 1.7  1.0 - 2.0 g/dL Final   • BUN/Creatinine Ratio 12/19/2018 21.5  6.3 - 21.9 Final   • Anion Gap 12/19/2018 13.0  10.0 - 20.0 mmol/L Final   • Magnesium 12/19/2018 1.9  1.6 - 2.3 mg/dL Final   • Phosphorus 12/19/2018 4.7* 2.5 - 4.5 mg/dL Final "   • WBC 12/19/2018 5.89  4.80 - 10.80 10*3/mm3 Final   • RBC 12/19/2018 4.82  4.70 - 6.10 10*6/mm3 Final   • Hemoglobin 12/19/2018 14.4  14.0 - 18.0 g/dL Final   • Hematocrit 12/19/2018 44.5  42.0 - 52.0 % Final   • MCV 12/19/2018 92.3  80.0 - 94.0 fL Final   • MCH 12/19/2018 29.9  27.0 - 31.0 pg Final   • MCHC 12/19/2018 32.4  30.0 - 37.0 g/dL Final   • RDW 12/19/2018 13.9  11.5 - 14.5 % Final   • RDW-SD 12/19/2018 47.2  37.0 - 54.0 fl Final   • MPV 12/19/2018 10.1  6.0 - 12.0 fL Final   • Platelets 12/19/2018 191  130 - 400 10*3/mm3 Final   • Neutrophil % 12/19/2018 61.8  37.0 - 80.0 % Final   • Lymphocyte % 12/19/2018 23.1  10.0 - 50.0 % Final   • Monocyte % 12/19/2018 9.0  0.0 - 12.0 % Final   • Eosinophil % 12/19/2018 4.8  0.0 - 7.0 % Final   • Basophil % 12/19/2018 0.8  0.0 - 2.5 % Final   • Immature Grans % 12/19/2018 0.5  0.0 - 0.6 % Final   • Neutrophils, Absolute 12/19/2018 3.64  2.00 - 6.90 10*3/mm3 Final   • Lymphocytes, Absolute 12/19/2018 1.36  0.60 - 3.40 10*3/mm3 Final   • Monocytes, Absolute 12/19/2018 0.53  0.00 - 0.90 10*3/mm3 Final   • Eosinophils, Absolute 12/19/2018 0.28  0.00 - 0.70 10*3/mm3 Final   • Basophils, Absolute 12/19/2018 0.05  0.00 - 0.20 10*3/mm3 Final   • Immature Grans, Absolute 12/19/2018 0.03  0.00 - 0.06 10*3/mm3 Final   • nRBC 12/19/2018 0.0  0.0 - 0.0 /100 WBC Final        ASSESSMENT: The patient is a very pleasant 80-year-old gentleman with relapsed  prostate cancer.     PROBLEM LIST:  1. Prostate cancer, initially presented as [id number] status post radical  prostatectomy 2000.  2. Rise in PSA gradually over the last 2 years,   3. Multiple comorbidities including type 2 diabetes, hypertension, morbid  obesity, osteoarthritis, and hypercholesterolemia.  4. Erectile dysfunction.   5.  Family history of colon cancer  6.  Osteopenia     PLAN:  1. I did go over the blood work results in detail with the patient. I explained to him that  his last PSA on 12/19/18 was down to <0.064.    I will continue Eligard 22.5mg q3 month.   2. The patient will come back to see me in 3 months with repeat blood work as well as serum PSA.   3. I will continue Viagra as needed for erectile dysfunction.   4.  We'll continue Januvia as well as metformin for type 2 diabetes.  His sugars were 171 yesterday.  5.  We'll continue pravastatin for hypercholesterolemia  6.  Patient need to have 5 year follow-up colonoscopy which would be due 11/2021.  7. We reviewed the potential side effects of the treatment including hot flashes, impotence, joint pain, decrease in bone density, mood or sleep disturbance, and asthenia.  8.  I'll continue the patient on Prolia 60 mg subcutaneous every 6 month.  His next dose will be due today.  We'll continue calcium with vitamin D daily.   Shannan Ramon MD  3/20/2019

## 2019-04-02 RX ORDER — FENOFIBRIC ACID 135 MG/1
CAPSULE, DELAYED RELEASE ORAL
Qty: 30 CAPSULE | Refills: 3 | Status: SHIPPED | OUTPATIENT
Start: 2019-04-02 | End: 2019-11-25 | Stop reason: SDUPTHER

## 2019-04-03 ENCOUNTER — OFFICE VISIT (OUTPATIENT)
Dept: INTERNAL MEDICINE | Facility: CLINIC | Age: 84
End: 2019-04-03

## 2019-04-03 VITALS
DIASTOLIC BLOOD PRESSURE: 70 MMHG | SYSTOLIC BLOOD PRESSURE: 120 MMHG | OXYGEN SATURATION: 98 % | WEIGHT: 205.2 LBS | BODY MASS INDEX: 33.12 KG/M2 | HEART RATE: 78 BPM

## 2019-04-03 DIAGNOSIS — C61 PROSTATE CANCER (HCC): ICD-10-CM

## 2019-04-03 DIAGNOSIS — M15.8 OTHER OSTEOARTHRITIS INVOLVING MULTIPLE JOINTS: ICD-10-CM

## 2019-04-03 DIAGNOSIS — I10 ESSENTIAL HYPERTENSION: ICD-10-CM

## 2019-04-03 DIAGNOSIS — E78.00 HYPERCHOLESTEREMIA: ICD-10-CM

## 2019-04-03 DIAGNOSIS — R21 RASH: Primary | ICD-10-CM

## 2019-04-03 PROCEDURE — 99213 OFFICE O/P EST LOW 20 MIN: CPT | Performed by: INTERNAL MEDICINE

## 2019-04-03 RX ORDER — NYSTATIN 100000 [USP'U]/G
POWDER TOPICAL DAILY PRN
COMMUNITY
End: 2019-04-03 | Stop reason: SDUPTHER

## 2019-04-03 RX ORDER — NYSTATIN 100000 [USP'U]/G
POWDER TOPICAL DAILY PRN
Qty: 60 G | Refills: 0 | Status: SHIPPED | OUTPATIENT
Start: 2019-04-03 | End: 2021-12-06

## 2019-04-03 NOTE — PROGRESS NOTES
Subjective   Blane Dietz is a 83 y.o. male.   Chief Complaint   Patient presents with   • Hypertension     Follow Up   • Osteoarthritis       Hypertension   This is a chronic problem. The current episode started more than 1 year ago. Pertinent negatives include no chest pain, headaches, neck pain, palpitations or shortness of breath.   Osteoarthritis   Pertinent negatives include no diarrhea, dysuria, fatigue, fever or rash. His past medical history is significant for osteoarthritis.   Hyperlipidemia   This is a chronic problem. The current episode started more than 1 year ago. Pertinent negatives include no chest pain, myalgias or shortness of breath.        Prostate cancer and is following with onc.    The following portions of the patient's history were reviewed and updated as appropriate: allergies, current medications, past family history, past medical history, past social history, past surgical history and problem list.    Review of Systems   Constitutional: Negative for activity change, appetite change, chills, diaphoresis, fatigue, fever and unexpected weight change.   HENT: Negative for congestion, ear discharge, ear pain, mouth sores, nosebleeds, sinus pressure, sneezing and sore throat.    Eyes: Negative for pain, discharge and itching.   Respiratory: Negative for cough, chest tightness, shortness of breath and wheezing.    Cardiovascular: Negative for chest pain, palpitations and leg swelling.   Gastrointestinal: Negative for abdominal pain, constipation, diarrhea, nausea and vomiting.   Endocrine: Negative for cold intolerance, heat intolerance, polydipsia and polyphagia.   Genitourinary: Negative for dysuria, flank pain, frequency, hematuria and urgency.   Musculoskeletal: Negative for arthralgias, back pain, gait problem, myalgias, neck pain and neck stiffness.   Skin: Negative for color change, pallor and rash.   Neurological: Negative for seizures, speech difficulty, numbness and headaches.    Psychiatric/Behavioral: Negative for agitation, confusion, decreased concentration and sleep disturbance. The patient is not nervous/anxious.      /70   Pulse 78   Wt 93.1 kg (205 lb 3.2 oz)   SpO2 98%   BMI 33.12 kg/m²     Objective   Physical Exam   Constitutional: He appears well-developed.   HENT:   Head: Normocephalic.   Right Ear: External ear normal.   Left Ear: External ear normal.   Nose: Nose normal.   Mouth/Throat: Oropharynx is clear and moist.   Eyes: Conjunctivae are normal. Pupils are equal, round, and reactive to light.   Neck: No JVD present. No thyromegaly present.   Cardiovascular: Normal rate, regular rhythm and normal heart sounds. Exam reveals no friction rub.   No murmur heard.  Pulmonary/Chest: Effort normal and breath sounds normal. No respiratory distress. He has no wheezes. He has no rales.   Abdominal: Soft. Bowel sounds are normal. He exhibits no distension. There is no tenderness. There is no guarding.   Musculoskeletal: He exhibits no edema or tenderness.   Lymphadenopathy:     He has no cervical adenopathy.   Neurological: He displays normal reflexes. No cranial nerve deficit.   Skin: No rash noted.   Psychiatric: His behavior is normal.   Nursing note and vitals reviewed.      Assessment/Plan   Blane was seen today for hypertension and osteoarthritis.    Diagnoses and all orders for this visit:    Essential hypertension  stable  Hypercholesteremia  Osteoarthritis  stable  Prostate cancer  Followed by onc. PSA trending back down.   DM  Follows with Endo regarding his blood sugars.

## 2019-04-06 ENCOUNTER — APPOINTMENT (OUTPATIENT)
Dept: GENERAL RADIOLOGY | Facility: HOSPITAL | Age: 84
End: 2019-04-06

## 2019-04-06 ENCOUNTER — HOSPITAL ENCOUNTER (EMERGENCY)
Facility: HOSPITAL | Age: 84
Discharge: HOME | End: 2019-04-06
Attending: EMERGENCY MEDICINE | Admitting: EMERGENCY MEDICINE

## 2019-04-06 VITALS
HEART RATE: 86 BPM | SYSTOLIC BLOOD PRESSURE: 170 MMHG | DIASTOLIC BLOOD PRESSURE: 90 MMHG | HEIGHT: 66 IN | TEMPERATURE: 97.3 F | OXYGEN SATURATION: 95 % | WEIGHT: 210.2 LBS | RESPIRATION RATE: 18 BRPM | BODY MASS INDEX: 33.78 KG/M2

## 2019-04-06 DIAGNOSIS — S80.812A ABRASION OF LEFT LOWER EXTREMITY, INITIAL ENCOUNTER: Primary | ICD-10-CM

## 2019-04-06 PROCEDURE — 99283 EMERGENCY DEPT VISIT LOW MDM: CPT

## 2019-04-06 PROCEDURE — 73590 X-RAY EXAM OF LOWER LEG: CPT

## 2019-04-06 RX ORDER — BACITRACIN, NEOMYCIN, POLYMYXIN B 400; 3.5; 5 [USP'U]/G; MG/G; [USP'U]/G
OINTMENT TOPICAL 2 TIMES DAILY
Qty: 1 EACH | Refills: 0 | Status: SHIPPED | OUTPATIENT
Start: 2019-04-06 | End: 2019-07-12

## 2019-04-06 RX ORDER — ACETAMINOPHEN 325 MG/1
650 TABLET ORAL ONCE
Status: COMPLETED | OUTPATIENT
Start: 2019-04-06 | End: 2019-04-06

## 2019-04-06 RX ADMIN — ACETAMINOPHEN 650 MG: 325 TABLET, FILM COATED ORAL at 08:10

## 2019-04-06 NOTE — ED PROVIDER NOTES
Subjective   History of Present Illness   83-year-old male presenting with painful wound to the left lower extremity.  States that he tripped and scraped his shin 2 days ago.  States that he was concerned because the pain seemed to get slightly worse and has been coming and going since this occurred.  Denies any associated swelling, drainage of pus, fevers.  His tetanus is up-to-date.  He has been able to walk around on this without too much difficulty.    Review of Systems   Skin: Positive for wound.   All other systems reviewed and are negative.      Past Medical History:   Diagnosis Date   • Aortic stenosis     status post ROSS procedure, remote, with December 2015 echocardiography revealing normal aortic and pulmonary valve function, normal ejection fraction and mild to moderate MR   • Arthritis    • Depression    • Diabetes mellitus (CMS/HCC)    • Diverticulitis    • Exogenous obesity    • Gout    • Heart murmur    • History of vitamin D deficiency    • Hyperlipidemia    • Hypertension    • Malignant neoplasm of prostate (CMS/HCC)    • Sleep apnea    • Vertigo        Allergies   Allergen Reactions   • Ampicillin Anaphylaxis   • Medrol [Methylprednisolone] Other (See Comments)     Made his blood sugar get really high, can't take this   • Penicillins Anaphylaxis   • Bee Venom Swelling   • Myrbetriq [Mirabegron] Other (See Comments)     High BP       Past Surgical History:   Procedure Laterality Date   • CARDIAC VALVE REPLACEMENT     • CATARACT EXTRACTION Bilateral    • COLON SURGERY     • COLONOSCOPY     • COLOSTOMY  1999   • COLOSTOMY REVISION     • EXPLORATORY LAPAROTOMY      With Tissue Removal   • LIPOMA EXCISION     • OTHER SURGICAL HISTORY      Porcine Valve   • PROSTATE SURGERY     • PROSTATECTOMY  2000     History of Prostatectomy Perineal Radical   • SKIN CANCER EXCISION      from head and lip   • TONSILLECTOMY         Family History   Problem Relation Age of Onset   • Diabetes Mother    • Lung cancer  Mother    • Cancer Mother    • Heart disease Father    • Breast cancer Other    • Cancer Other    • Hypertension Other    • Migraines Other    • Obesity Other    • Diabetes Other        Social History     Socioeconomic History   • Marital status: Single     Spouse name: Not on file   • Number of children: Not on file   • Years of education: Not on file   • Highest education level: Not on file   Tobacco Use   • Smoking status: Never Smoker   • Smokeless tobacco: Never Used   Substance and Sexual Activity   • Alcohol use: No   • Drug use: No   • Sexual activity: Defer           Objective   Physical Exam   Constitutional: He is oriented to person, place, and time. He appears well-developed and well-nourished. No distress.   HENT:   Head: Normocephalic.   Mouth/Throat: Oropharynx is clear and moist.   Eyes: Right eye exhibits no discharge. Left eye exhibits no discharge. No scleral icterus.   Neck: Neck supple. No tracheal deviation present.   Cardiovascular: Normal rate, regular rhythm and intact distal pulses.   Pulmonary/Chest: Effort normal. No respiratory distress.   Musculoskeletal: Normal range of motion. He exhibits tenderness. He exhibits no edema or deformity.   Abrasion over the left tibia.  Mildly tender to palpation.  No tenderness to palpation otherwise throughout the lower extremities.   Neurological: He is alert and oriented to person, place, and time.   Skin: Skin is warm and dry. No rash noted. He is not diaphoretic. No erythema. No pallor.   Psychiatric: He has a normal mood and affect. His behavior is normal.   Nursing note and vitals reviewed.      Procedures           ED Course                  MDM  83-year-old male here with abrasion to his left shin.  Afebrile, vital signs stable.  Does not appear grossly infected.  He is asking for an x-ray, so we will get this to show that there is no fracture which I highly doubt at this time.  We will give him a dose of Tylenol as well and discussed wound  care.    8:35 AM x-ray shows no acute fracture.  Will discharge home with wound care recommendations and return to care precautions.    Final diagnoses:   Abrasion of left lower extremity, initial encounter            Burton Byrne MD  04/06/19 0881

## 2019-04-06 NOTE — ED NOTES
Non-adherent dressing applied to abrasion. Kerlix applied.       Kassie Whitley RN  04/06/19 0819

## 2019-04-10 ENCOUNTER — OFFICE VISIT (OUTPATIENT)
Dept: INTERNAL MEDICINE | Facility: CLINIC | Age: 84
End: 2019-04-10

## 2019-04-10 VITALS
DIASTOLIC BLOOD PRESSURE: 70 MMHG | TEMPERATURE: 97.7 F | WEIGHT: 206 LBS | HEART RATE: 81 BPM | BODY MASS INDEX: 33.11 KG/M2 | OXYGEN SATURATION: 97 % | SYSTOLIC BLOOD PRESSURE: 138 MMHG | HEIGHT: 66 IN

## 2019-04-10 DIAGNOSIS — S80.812A ABRASION OF LEFT LOWER EXTREMITY, INITIAL ENCOUNTER: ICD-10-CM

## 2019-04-10 PROCEDURE — 90715 TDAP VACCINE 7 YRS/> IM: CPT | Performed by: NURSE PRACTITIONER

## 2019-04-10 PROCEDURE — 90471 IMMUNIZATION ADMIN: CPT | Performed by: NURSE PRACTITIONER

## 2019-04-10 PROCEDURE — 99212 OFFICE O/P EST SF 10 MIN: CPT | Performed by: NURSE PRACTITIONER

## 2019-04-10 NOTE — PROGRESS NOTES
Subjective   Blane Dietz is a 83 y.o. male.   Chief Complaint   Patient presents with   • Wound on leg     was at [facility] ER on 04/06/19      History of Present Illness patient reports he scraped his left shin on April 4, 2019.  He was seen in the emergency department.  X-ray was negative.  I have reviewed the report.  He is here today for follow-up.  He states he is cleaning his wound daily with Dial soap and applying triple antibiotic ointment.  He says it is less sore today.  Denies fever or chills.  He is diabetic.  Reports his sugars are staying less than 150.    The following portions of the patient's history were reviewed and updated as appropriate: allergies, current medications, past family history, past medical history, past social history, past surgical history and problem list.    Current Outpatient Medications:   •  allopurinol (ZYLOPRIM) 300 MG tablet, TAKE ONE TABLET BY MOUTH DAILY, Disp: 30 tablet, Rfl: 4  •  ascorbic acid (VITAMIN C) 1000 MG tablet, Take 1,000 mg by mouth Daily., Disp: , Rfl:   •  aspirin 81 MG EC tablet, Take 81 mg by mouth Daily., Disp: , Rfl:   •  B Complex Vitamins (VITAMIN B COMPLEX PO), Take  by mouth Daily., Disp: , Rfl:   •  CRISTINA CONTOUR TEST test strip, , Disp: , Rfl:   •  Cholecalciferol (VITAMIN D3) 2000 UNITS capsule, Take  by mouth 2 (two) times a day., Disp: , Rfl:   •  Cinnamon 500 MG capsule, Take 2,000 mg by mouth Daily., Disp: , Rfl:   •  clindamycin (CLEOCIN) 300 MG capsule, TAKE 1 CAPSULE BY MOUTH THREE TIMES A DAY UNTIL GONE, Disp: , Rfl: 0  •  Coenzyme Q10 100 MG tablet, Take 2 tablets by mouth Daily., Disp: , Rfl:   •  fenofibric acid (TRILIPIX) 135 MG capsule delayed-release delayed release capsule, TAKE ONE CAPSULE BY MOUTH DAILY, Disp: 30 capsule, Rfl: 3  •  FLORASTOR 250 MG capsule, TAKE ONE CAPSULE BY MOUTH TWICE A DAY, Disp: 60 capsule, Rfl: 3  •  folic acid (FOLVITE) 400 MCG tablet, Take  by mouth 1 (one) time per week., Disp: , Rfl:   •   "glimepiride (AMARYL) 4 MG tablet, Take 4 mg by mouth Daily. 200001, Disp: , Rfl: 5  •  hydrochlorothiazide (HYDRODIURIL) 25 MG tablet, Take 25 mg by mouth Daily., Disp: , Rfl:   •  JANUVIA 100 MG tablet, TAKE ONE TABLET BY MOUTH DAILY, Disp: 30 tablet, Rfl: 4  •  JARDIANCE 10 MG tablet, 10 mg Daily., Disp: , Rfl:   •  l-methylfolate-algae-B6-B12 (METANX) 3-90.314-2-35 MG capsule capsule, Take 1 capsule by mouth 2 (Two) Times a Day., Disp: 180 capsule, Rfl: 0  •  Lecithin 1200 MG capsule, Take  by mouth Daily., Disp: , Rfl:   •  metFORMIN (GLUCOPHAGE) 500 MG tablet, Take 2 tablets by mouth 2 (Two) Times a Day With Meals., Disp: 120 tablet, Rfl: 2  •  metoprolol succinate XL (TOPROL-XL) 100 MG 24 hr tablet, TAKE 1 TABLET BY MOUTH ONE TIME A DAY , Disp: 30 tablet, Rfl: 2  •  neomycin-bacitracin-polymyxin (NEOSPORIN) 400-5-5000 ointment, Apply  topically to the appropriate area as directed 2 (Two) Times a Day., Disp: 1 each, Rfl: 0  •  nystatin (MYCOSTATIN) 444815 UNIT/GM powder, Apply  topically to the appropriate area as directed Daily As Needed (rash)., Disp: 60 g, Rfl: 0  •  Omega-3 Fatty Acids (FISH OIL) 1000 MG capsule capsule, Take 1,000 mg by mouth As Needed., Disp: , Rfl:   •  Omega-3 Fatty Acids (OMEGA-3 CF PO), Take  by mouth Daily., Disp: , Rfl:   •  pravastatin (PRAVACHOL) 40 MG tablet, TAKE ONE TABLET BY MOUTH EVERY NIGHT AT BEDTIME, Disp: 30 tablet, Rfl: 4  •  triamterene-hydrochlorothiazide (DYAZIDE) 37.5-25 MG per capsule, TAKE ONE CAPSULE BY MOUTH DAILY, Disp: 30 capsule, Rfl: 4  •  vitamin E 1000 UNIT capsule, Take 1,000 Units by mouth Daily., Disp: , Rfl:     Review of Systems  Consitutional, HEENT, Respiratory, CV, GI, , Skin, Musculoskeletal, Neuro-mental, Endocrinological, Hematological were reviewed.  Positives were discussed in the HPI, otherwise ROS was negative   /70   Pulse 81   Temp 97.7 °F (36.5 °C)   Ht 167.6 cm (66\")   Wt 93.4 kg (206 lb)   SpO2 97%   BMI 33.25 kg/m² "     Objective   Allergies   Allergen Reactions   • Ampicillin Anaphylaxis   • Medrol [Methylprednisolone] Other (See Comments)     Made his blood sugar get really high, can't take this   • Penicillins Anaphylaxis   • Bee Venom Swelling   • Myrbetriq [Mirabegron] Other (See Comments)     High BP       Physical Exam   Constitutional: He appears well-developed and well-nourished.   Musculoskeletal:   Left shin has 3 abraded wounds.  There is some circumferential redness surrounding the wounds.  The wound beds are pink.  No increased warmth.   Skin: Skin is warm and dry. Capillary refill takes less than 2 seconds.   Is pink, no rash    Nursing note and vitals reviewed.      Procedures      Assessment/Plan   Blane was seen today for wound on leg.    Diagnoses and all orders for this visit:    Abrasion of left lower extremity, initial encounter  -     Tdap Vaccine Greater Than or Equal To 6yo IM      Patient Instructions   May use regular soap and water to clean wounds.  Apply tiny amounts of triple antibiotic ointment.  May cover with Band-Aid.  Return to the clinic on Friday for a wound check by me then will return to Dr. Wagner for regular medical care.  We are given a Tdap today.Pt verbalizes understanding and agreement with plan of care.     EMR Dragon/transcription disclaimer:  Please note that portions of this note were completed with a voice recognition program.  Electronic transcription of the voice recognition program may permit erroneous words or phrases to be inadvertently transcribed.  Although I have reviewed the note for such errors, some may still exist in this documentation     HIPOLITO Duarte

## 2019-04-10 NOTE — PATIENT INSTRUCTIONS
May use regular soap and water to clean wounds.  Apply tiny amounts of triple antibiotic ointment.  May cover with Band-Aid.  Return to the clinic on Friday for a wound check by me then will return to Dr. Wagner for regular medical care.  We are given a Tdap today.Pt verbalizes understanding and agreement with plan of care.

## 2019-04-11 ENCOUNTER — TELEPHONE (OUTPATIENT)
Dept: INTERNAL MEDICINE | Facility: CLINIC | Age: 84
End: 2019-04-11

## 2019-04-12 ENCOUNTER — OFFICE VISIT (OUTPATIENT)
Dept: INTERNAL MEDICINE | Facility: CLINIC | Age: 84
End: 2019-04-12

## 2019-04-12 ENCOUNTER — TELEPHONE (OUTPATIENT)
Dept: INTERNAL MEDICINE | Facility: CLINIC | Age: 84
End: 2019-04-12

## 2019-04-12 VITALS
DIASTOLIC BLOOD PRESSURE: 80 MMHG | BODY MASS INDEX: 33.09 KG/M2 | HEART RATE: 70 BPM | WEIGHT: 205 LBS | SYSTOLIC BLOOD PRESSURE: 150 MMHG | OXYGEN SATURATION: 98 %

## 2019-04-12 DIAGNOSIS — L03.116 CELLULITIS OF LEFT LOWER EXTREMITY: Primary | ICD-10-CM

## 2019-04-12 PROCEDURE — 99213 OFFICE O/P EST LOW 20 MIN: CPT | Performed by: INTERNAL MEDICINE

## 2019-04-12 RX ORDER — MUPIROCIN CALCIUM 20 MG/G
CREAM TOPICAL 3 TIMES DAILY
Qty: 15 G | Refills: 0 | Status: SHIPPED | OUTPATIENT
Start: 2019-04-12 | End: 2019-04-22 | Stop reason: SDUPTHER

## 2019-04-12 RX ORDER — DOXYCYCLINE HYCLATE 100 MG/1
100 CAPSULE ORAL 2 TIMES DAILY
Qty: 8 CAPSULE | Refills: 0 | Status: SHIPPED | OUTPATIENT
Start: 2019-04-12 | End: 2019-04-16

## 2019-04-12 NOTE — PROGRESS NOTES
Subjective   Blane Dietz is a 83 y.o. male.   Chief Complaint   Patient presents with   • Wound on Leg     Follow Up       History of Present Illness   F/u on wound on left leg. Seen in ER in [place] given neosporin. Seen 2 days ago by our NP and told to f/u wit me. Area red and warm. Given tdap. He is a diabetic  The following portions of the patient's history were reviewed and updated as appropriate: allergies, current medications, past family history, past medical history, past social history, past surgical history and problem list.    Review of Systems   Constitutional: Negative for activity change, appetite change, chills, diaphoresis, fatigue, fever and unexpected weight change.   HENT: Negative for congestion, ear discharge, ear pain, mouth sores, nosebleeds, sinus pressure, sneezing and sore throat.    Eyes: Negative for pain, discharge and itching.   Respiratory: Negative for cough, chest tightness, shortness of breath and wheezing.    Cardiovascular: Negative for chest pain, palpitations and leg swelling.   Gastrointestinal: Negative for abdominal pain, constipation, diarrhea, nausea and vomiting.   Endocrine: Negative for cold intolerance, heat intolerance, polydipsia and polyphagia.   Genitourinary: Negative for dysuria, flank pain, frequency, hematuria and urgency.   Musculoskeletal: Negative for arthralgias, back pain, gait problem, myalgias, neck pain and neck stiffness.   Skin: Negative for color change, pallor and rash.   Neurological: Negative for seizures, speech difficulty, numbness and headaches.   Psychiatric/Behavioral: Negative for agitation, confusion, decreased concentration and sleep disturbance. The patient is not nervous/anxious.      /80   Pulse 70   Wt 93 kg (205 lb)   SpO2 98%   BMI 33.09 kg/m²     Objective   Physical Exam   Constitutional: He is oriented to person, place, and time. He appears well-developed and well-nourished.   HENT:   Head: Normocephalic and  atraumatic.   Right Ear: External ear normal.   Left Ear: External ear normal.   Nose: Nose normal.   Mouth/Throat: Oropharynx is clear and moist.   Eyes: Conjunctivae and EOM are normal. Pupils are equal, round, and reactive to light.   Neck: Normal range of motion. Neck supple. No JVD present. No thyromegaly present.   Cardiovascular: Normal rate, regular rhythm, normal heart sounds and intact distal pulses. Exam reveals no gallop and no friction rub.   No murmur heard.  Pulmonary/Chest: Effort normal and breath sounds normal. No respiratory distress. He has no wheezes. He has no rales. He exhibits no tenderness.   Abdominal: Soft. Bowel sounds are normal. He exhibits no distension and no mass. There is no tenderness. There is no rebound and no guarding. No hernia.   Genitourinary: Rectum normal, prostate normal and penis normal.   Lymphadenopathy:     He has no cervical adenopathy.   Neurological: He is oriented to person, place, and time. He displays normal reflexes. No cranial nerve deficit. He exhibits normal muscle tone. Coordination normal.   Skin: No rash noted. No erythema. No pallor.        Psychiatric: He has a normal mood and affect.       Assessment/Plan   Blane was seen today for wound on leg.    Diagnoses and all orders for this visit:    Cellulitis of left lower extremity  Is on doxycycline, add bactroban cream 1 week f/u

## 2019-04-12 NOTE — TELEPHONE ENCOUNTER
tiffanie phone call from ptFlakita Nunez was seen today and thought 2 prescriptions were going to be called in; both oral and topical abx.  Has RX for cream but no oral abx.  He finished the doxy doesn't have anymore and thought he was supposed to continue.  I reviewed Dr. Wagner note treating cellulitis. States already on doxy.  I gave him an additional 4 days of doxy for total 14 days.  I will let Dr. Wagner know if needs any additional treatment.

## 2019-04-15 RX ORDER — PRAVASTATIN SODIUM 40 MG
TABLET ORAL
Qty: 30 TABLET | Refills: 3 | Status: SHIPPED | OUTPATIENT
Start: 2019-04-15 | End: 2019-11-04 | Stop reason: SDUPTHER

## 2019-04-22 ENCOUNTER — OFFICE VISIT (OUTPATIENT)
Dept: INTERNAL MEDICINE | Facility: CLINIC | Age: 84
End: 2019-04-22

## 2019-04-22 VITALS
SYSTOLIC BLOOD PRESSURE: 110 MMHG | WEIGHT: 205.2 LBS | OXYGEN SATURATION: 95 % | DIASTOLIC BLOOD PRESSURE: 70 MMHG | BODY MASS INDEX: 33.12 KG/M2 | HEART RATE: 71 BPM

## 2019-04-22 DIAGNOSIS — L03.116 CELLULITIS OF LEFT LOWER EXTREMITY: ICD-10-CM
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
## 2019-04-22 PROCEDURE — 99213 OFFICE O/P EST LOW 20 MIN: CPT | Performed by: INTERNAL MEDICINE

## 2019-04-22 RX ORDER — MUPIROCIN CALCIUM 20 MG/G
CREAM TOPICAL 3 TIMES DAILY
Qty: 30 G | Refills: 0 | Status: SHIPPED | OUTPATIENT
Start: 2019-04-22 | End: 2019-07-12

## 2019-04-22 NOTE — PROGRESS NOTES
Subjective   Blane Dietz is a 83 y.o. male.   Chief Complaint   Patient presents with   • Leg Wound     Follow Up       History of Present Illness   F/u on wound on left leg. Seen in ER in [place] given neosporin. Had tdap. Added Bactroban to his doxycycline. Looks much better now.  Bite by tick. Was  In [facility] cleaning up  C[place].He didn't notice until went for a hir cut at [facility] 3 days after.   The following portions of the patient's history were reviewed and updated as appropriate: allergies, current medications, past family history, past medical history, past social history, past surgical history and problem list.    Review of Systems   Constitutional: Negative for activity change, appetite change, chills, diaphoresis, fatigue, fever and unexpected weight change.   HENT: Negative for congestion, ear discharge, ear pain, mouth sores, nosebleeds, sinus pressure, sneezing and sore throat.    Eyes: Negative for pain, discharge and itching.   Respiratory: Negative for cough, chest tightness, shortness of breath and wheezing.    Cardiovascular: Negative for chest pain, palpitations and leg swelling.   Gastrointestinal: Negative for abdominal pain, constipation, diarrhea, nausea and vomiting.   Endocrine: Negative for cold intolerance, heat intolerance, polydipsia and polyphagia.   Genitourinary: Negative for dysuria, flank pain, frequency, hematuria and urgency.   Musculoskeletal: Negative for arthralgias, back pain, gait problem, myalgias, neck pain and neck stiffness.   Skin: Negative for color change, pallor and rash.   Neurological: Negative for seizures, speech difficulty, numbness and headaches.   Psychiatric/Behavioral: Negative for agitation, confusion, decreased concentration and sleep disturbance. The patient is not nervous/anxious.      /70   Pulse 71   Wt 93.1 kg (205 lb 3.2 oz)   SpO2 95%   BMI 33.12 kg/m²     Objective   Physical Exam   Constitutional: He is oriented to person,  place, and time. He appears well-developed and well-nourished.   HENT:   Head: Normocephalic and atraumatic.   Right Ear: External ear normal.   Left Ear: External ear normal.   Nose: Nose normal.   Mouth/Throat: Oropharynx is clear and moist.   Eyes: Conjunctivae and EOM are normal. Pupils are equal, round, and reactive to light.   Neck: Normal range of motion. Neck supple. No JVD present. No thyromegaly present.   Cardiovascular: Normal rate, regular rhythm, normal heart sounds and intact distal pulses. Exam reveals no gallop and no friction rub.   No murmur heard.  Pulmonary/Chest: Effort normal and breath sounds normal. No respiratory distress. He has no wheezes. He has no rales. He exhibits no tenderness.   Abdominal: Soft. Bowel sounds are normal. He exhibits no distension and no mass. There is no tenderness. There is no rebound and no guarding. No hernia.   Genitourinary: Rectum normal, prostate normal and penis normal.   Lymphadenopathy:     He has no cervical adenopathy.   Neurological: He is oriented to person, place, and time. He displays normal reflexes. No cranial nerve deficit. He exhibits normal muscle tone. Coordination normal.   Skin: No rash noted. No erythema. No pallor.        Psychiatric: He has a normal mood and affect.       Assessment/Plan   Blane was seen today for wound on leg.    Diagnoses and all orders for this visit:    Cellulitis of left lower extremity  Has resolved    Tick bite  Lyme titer

## 2019-04-24 LAB — B BURGDOR IGG+IGM SER-ACNC: <0.91 ISR (ref 0–0.9)

## 2019-05-06 ENCOUNTER — OFFICE VISIT (OUTPATIENT)
Dept: CARDIOLOGY | Facility: CLINIC | Age: 84
End: 2019-05-06

## 2019-05-06 VITALS
WEIGHT: 204.6 LBS | SYSTOLIC BLOOD PRESSURE: 128 MMHG | BODY MASS INDEX: 32.88 KG/M2 | HEART RATE: 81 BPM | HEIGHT: 66 IN | OXYGEN SATURATION: 97 % | DIASTOLIC BLOOD PRESSURE: 70 MMHG

## 2019-05-06 DIAGNOSIS — I10 ESSENTIAL HYPERTENSION: Primary | ICD-10-CM

## 2019-05-06 DIAGNOSIS — E11.65 UNCONTROLLED TYPE 2 DIABETES MELLITUS WITH HYPERGLYCEMIA (HCC): ICD-10-CM

## 2019-05-06 DIAGNOSIS — I35.0 AORTIC VALVE STENOSIS, ETIOLOGY OF CARDIAC VALVE DISEASE UNSPECIFIED: ICD-10-CM

## 2019-05-06 PROCEDURE — 99214 OFFICE O/P EST MOD 30 MIN: CPT | Performed by: NURSE PRACTITIONER

## 2019-05-06 NOTE — PROGRESS NOTES
"Blane Dietz is a 83 y.o. male.  MRN #: [id number]    Referring Provider: Maria Fernanda Wagner MD    Chief Complaint:   Chief Complaint   Patient presents with   • Follow-up        History of Present Illness:  Patient is a 83-year-old gentleman that presents for his 1 year follow-up, for aortic stenosis, post ROSS procedure that patient states was several years ago.  His last echocardiogram was 2015 which revealed normal aortic and pulmonary valve function with normal ejection fraction and mild to moderate mitral regurg.  Essential hypertension, and his blood pressure according to Mr. Dietz has been in the 120-130  systolic range.  He takes hydrochlorothiazide 25 mg 1 p.o. daily, metoprolol succinate  mg p.o. daily Dyazide 37.5/25 1 daily for blood pressure management.  His primary care doctor coverage his elevated cholesterol as well as his diabetes mellitus type 2.  He does relate that his last A1c was elevated at 9.0%.  Mr. Dietz states that over the past 6 months he has \"let himself go\" over the death of a family member.  Based on his elevated blood sugar and increased weight, he states he is going to \"make changes now\".  He denies any chest pain or unusual shortness of breath.  He denies any palpitations.  He denies any orthopnea or lower extremity edema.  He does have a history of HUSSAIN and uses a CPAP.  Mr. Dietz did express concerns about the lifespan of his aortic valve that has been replaced.  He states the lifespan from his understanding is approximately 20 years.  He is wanting us to keep a closer check on his cardiac status particularly his heart valve status.    The patient presents today with their self who contributes to the history of their care.     The following portions of the patient's history were reviewed and updated as appropriate: allergies, current medications, past family history, past medical history, past social history, past surgical history and problem list.     Review of Systems: "     Review of Systems   Constitutional: Negative for activity change, appetite change, diaphoresis, fatigue, unexpected weight gain and unexpected weight loss.   Eyes: Negative for blurred vision and visual disturbance.   Respiratory: Negative for apnea, chest tightness, shortness of breath and wheezing.         History of obstructive sleep apnea and he is compliant with his CPAP.   Cardiovascular: Negative for chest pain, palpitations and leg swelling.   Gastrointestinal: Negative for abdominal distention, abdominal pain, nausea, GERD and indigestion.   Genitourinary: Negative for decreased libido and hematuria.   Musculoskeletal: Negative for back pain, joint swelling and neck pain.   Skin: Negative for pallor and bruise.   Neurological: Negative for dizziness, tremors, syncope, facial asymmetry, speech difficulty, weakness, numbness, headache and confusion.   Hematological: Does not bruise/bleed easily.   Psychiatric/Behavioral: Negative for agitation and stress. The patient is not nervous/anxious.    All other systems reviewed and are negative.         Current Outpatient Medications:   •  allopurinol (ZYLOPRIM) 300 MG tablet, TAKE ONE TABLET BY MOUTH DAILY, Disp: 30 tablet, Rfl: 4  •  ascorbic acid (VITAMIN C) 1000 MG tablet, Take 1,000 mg by mouth Daily., Disp: , Rfl:   •  aspirin 81 MG EC tablet, Take 81 mg by mouth Daily., Disp: , Rfl:   •  B Complex Vitamins (VITAMIN B COMPLEX PO), Take  by mouth Daily., Disp: , Rfl:   •  CRISTINA CONTOUR TEST test strip, , Disp: , Rfl:   •  Cholecalciferol (VITAMIN D3) 2000 UNITS capsule, Take  by mouth 2 (two) times a day., Disp: , Rfl:   •  Cinnamon 500 MG capsule, Take 2,000 mg by mouth Daily., Disp: , Rfl:   •  Coenzyme Q10 100 MG tablet, Take 2 tablets by mouth Daily., Disp: , Rfl:   •  fenofibric acid (TRILIPIX) 135 MG capsule delayed-release delayed release capsule, TAKE ONE CAPSULE BY MOUTH DAILY, Disp: 30 capsule, Rfl: 3  •  glimepiride (AMARYL) 4 MG tablet, Take 4 mg  by mouth Daily. 200001, Disp: , Rfl: 5  •  hydrochlorothiazide (HYDRODIURIL) 25 MG tablet, Take 25 mg by mouth Daily., Disp: , Rfl:   •  JANUVIA 100 MG tablet, TAKE ONE TABLET BY MOUTH DAILY, Disp: 30 tablet, Rfl: 4  •  JARDIANCE 10 MG tablet, 10 mg Daily., Disp: , Rfl:   •  l-methylfolate-algae-B6-B12 (METANX) 3-90.314-2-35 MG capsule capsule, Take 1 capsule by mouth 2 (Two) Times a Day., Disp: 180 capsule, Rfl: 0  •  metFORMIN (GLUCOPHAGE) 500 MG tablet, Take 2 tablets by mouth 2 (Two) Times a Day With Meals., Disp: 120 tablet, Rfl: 2  •  metoprolol succinate XL (TOPROL-XL) 100 MG 24 hr tablet, TAKE 1 TABLET BY MOUTH ONE TIME A DAY , Disp: 30 tablet, Rfl: 2  •  neomycin-bacitracin-polymyxin (NEOSPORIN) 400-5-5000 ointment, Apply  topically to the appropriate area as directed 2 (Two) Times a Day., Disp: 1 each, Rfl: 0  •  nystatin (MYCOSTATIN) 120169 UNIT/GM powder, Apply  topically to the appropriate area as directed Daily As Needed (rash)., Disp: 60 g, Rfl: 0  •  Omega-3 Fatty Acids (OMEGA-3 CF PO), Take  by mouth Daily., Disp: , Rfl:   •  pravastatin (PRAVACHOL) 40 MG tablet, TAKE ONE TABLET BY MOUTH EVERY NIGHT AT BEDTIME, Disp: 30 tablet, Rfl: 3  •  triamterene-hydrochlorothiazide (DYAZIDE) 37.5-25 MG per capsule, TAKE ONE CAPSULE BY MOUTH DAILY, Disp: 30 capsule, Rfl: 4  •  vitamin E 1000 UNIT capsule, Take 1,000 Units by mouth Daily., Disp: , Rfl:   •  clindamycin (CLEOCIN) 300 MG capsule, TAKE 1 CAPSULE BY MOUTH THREE TIMES A DAY UNTIL GONE, Disp: , Rfl: 0  •  FLORASTOR 250 MG capsule, TAKE ONE CAPSULE BY MOUTH TWICE A DAY, Disp: 60 capsule, Rfl: 3  •  folic acid (FOLVITE) 400 MCG tablet, Take  by mouth 1 (one) time per week., Disp: , Rfl:   •  Lecithin 1200 MG capsule, Take  by mouth Daily., Disp: , Rfl:   •  mupirocin (BACTROBAN) 2 % cream, Apply  topically to the appropriate area as directed 3 (Three) Times a Day., Disp: 30 g, Rfl: 0  •  Omega-3 Fatty Acids (FISH OIL) 1000 MG capsule capsule, Take 1,000  "mg by mouth As Needed., Disp: , Rfl:     Vitals:    05/06/19 1246   BP: 128/70   BP Location: Right arm   Patient Position: Sitting   Cuff Size: Adult   Pulse: 81   SpO2: 97%   Weight: 92.8 kg (204 lb 9.6 oz)   Height: 167.6 cm (66\")       Physical Exam:     Physical Exam   Constitutional: He is oriented to person, place, and time. He appears well-developed and well-nourished. No distress.   HENT:   Head: Normocephalic and atraumatic.   Eyes: Conjunctivae are normal. Pupils are equal, round, and reactive to light. No scleral icterus.   Neck: Normal range of motion. Neck supple. No JVD present.   Cardiovascular: Normal rate, regular rhythm, normal heart sounds and intact distal pulses. PMI is not displaced. Exam reveals no gallop and no friction rub.   No murmur heard.  Pulmonary/Chest: Effort normal and breath sounds normal. No respiratory distress. He has no wheezes. He has no rales. He exhibits no tenderness.   Abdominal: Soft. Bowel sounds are normal. He exhibits no distension. There is no tenderness.   Musculoskeletal: Normal range of motion. He exhibits no edema.   Distal pulses are 2+ and symmetric.   Neurological: He is alert and oriented to person, place, and time.   Skin: Skin is warm and dry. Capillary refill takes less than 2 seconds. No rash noted. He is not diaphoretic.   Psychiatric: He has a normal mood and affect. His behavior is normal. Judgment and thought content normal.   Nursing note and vitals reviewed.      Procedures    Results:   Reviewed vital signs, medication regimen, and results of echocardiogram from May 2018.    I reviewed the patient's new clinical results.  I personally viewed and interpreted the patient's EKG/Telemetry data    Assessment/Plan:   We did discuss the importance of weight loss and an increase in physical exercise and aerobic workouts.  Patient will also follow-up with his primary care doctor regarding management of his elevated hemoglobin A1c.  He acknowledges that with " increased exercise and with weight loss that his blood glucose and his hemoglobin A1c will return to his baseline normal.  As stated above Mr. Dietz has had life events that has kept him from his daily activity/exercise regimen.  Mr. Dietz did ask of any change in his heart murmur related to his aortic stenosis.  I advised him I have no comparison being that this is the first time I have evaluated Mr. Dietz.  I did reiterate to him the echocardiogram from 1 year ago was essentially normal.  He asked that we monitor him closer and more frequently for his aortic stenosis.  We will see him in 6 months and he is instructed that should he develop any symptoms of concern to please call our office and schedule a follow-up appointment.  Blane was seen today for follow-up.    Diagnoses and all orders for this visit:    Essential hypertension    Aortic valve stenosis, etiology of cardiac valve disease unspecified    Uncontrolled type 2 diabetes mellitus with hyperglycemia (CMS/[facility])        Return in about 6 months (around 11/6/2019).    HIPOLITO Horne

## 2019-06-18 RX ORDER — METOPROLOL SUCCINATE 100 MG/1
TABLET, EXTENDED RELEASE ORAL
Qty: 30 TABLET | Refills: 1 | Status: SHIPPED | OUTPATIENT
Start: 2019-06-18 | End: 2019-11-12 | Stop reason: SDUPTHER

## 2019-06-19 ENCOUNTER — INFUSION (OUTPATIENT)
Dept: ONCOLOGY | Facility: HOSPITAL | Age: 84
End: 2019-06-19

## 2019-06-19 ENCOUNTER — OFFICE VISIT (OUTPATIENT)
Dept: ONCOLOGY | Facility: CLINIC | Age: 84
End: 2019-06-19

## 2019-06-19 VITALS
TEMPERATURE: 97.8 F | DIASTOLIC BLOOD PRESSURE: 68 MMHG | HEIGHT: 66 IN | SYSTOLIC BLOOD PRESSURE: 138 MMHG | RESPIRATION RATE: 17 BRPM | BODY MASS INDEX: 32.95 KG/M2 | WEIGHT: 205 LBS | HEART RATE: 74 BPM

## 2019-06-19 DIAGNOSIS — C61 PROSTATE CANCER (HCC): Primary | ICD-10-CM

## 2019-06-19 LAB
ALBUMIN SERPL-MCNC: 4.3 G/DL (ref 3.5–5)
ALBUMIN/GLOB SERPL: 1.5 G/DL (ref 1–2)
ALP SERPL-CCNC: 44 U/L (ref 38–126)
ALT SERPL W P-5'-P-CCNC: 46 U/L (ref 13–69)
ANION GAP SERPL CALCULATED.3IONS-SCNC: 14.9 MMOL/L (ref 10–20)
AST SERPL-CCNC: 44 U/L (ref 15–46)
BASOPHILS # BLD AUTO: 0.04 10*3/MM3 (ref 0–0.2)
BASOPHILS NFR BLD AUTO: 0.7 % (ref 0–1.5)
BILIRUB SERPL-MCNC: 0.8 MG/DL (ref 0.2–1.3)
BUN BLD-MCNC: 21 MG/DL (ref 7–20)
BUN/CREAT SERPL: 17.5 (ref 6.3–21.9)
CALCIUM SPEC-SCNC: 9.3 MG/DL (ref 8.4–10.2)
CHLORIDE SERPL-SCNC: 100 MMOL/L (ref 98–107)
CO2 SERPL-SCNC: 28 MMOL/L (ref 26–30)
CREAT BLD-MCNC: 1.2 MG/DL (ref 0.6–1.3)
DEPRECATED RDW RBC AUTO: 44.2 FL (ref 37–54)
EOSINOPHIL # BLD AUTO: 0.25 10*3/MM3 (ref 0–0.4)
EOSINOPHIL NFR BLD AUTO: 4.2 % (ref 0.3–6.2)
ERYTHROCYTE [DISTWIDTH] IN BLOOD BY AUTOMATED COUNT: 13.3 % (ref 12.3–15.4)
GFR SERPL CREATININE-BSD FRML MDRD: 58 ML/MIN/1.73
GLOBULIN UR ELPH-MCNC: 2.9 GM/DL
GLUCOSE BLD-MCNC: 291 MG/DL (ref 74–98)
HCT VFR BLD AUTO: 43 % (ref 37.5–51)
HGB BLD-MCNC: 14 G/DL (ref 13–17.7)
IMM GRANULOCYTES # BLD AUTO: 0.03 10*3/MM3 (ref 0–0.05)
IMM GRANULOCYTES NFR BLD AUTO: 0.5 % (ref 0–0.5)
LYMPHOCYTES # BLD AUTO: 1.27 10*3/MM3 (ref 0.7–3.1)
LYMPHOCYTES NFR BLD AUTO: 21.3 % (ref 19.6–45.3)
MAGNESIUM SERPL-MCNC: 2 MG/DL (ref 1.6–2.3)
MCH RBC QN AUTO: 29.4 PG (ref 26.6–33)
MCHC RBC AUTO-ENTMCNC: 32.6 G/DL (ref 31.5–35.7)
MCV RBC AUTO: 90.1 FL (ref 79–97)
MONOCYTES # BLD AUTO: 0.5 10*3/MM3 (ref 0.1–0.9)
MONOCYTES NFR BLD AUTO: 8.4 % (ref 5–12)
NEUTROPHILS # BLD AUTO: 3.86 10*3/MM3 (ref 1.7–7)
NEUTROPHILS NFR BLD AUTO: 64.9 % (ref 42.7–76)
NRBC BLD AUTO-RTO: 0 /100 WBC (ref 0–0.2)
PHOSPHATE SERPL-MCNC: 4.5 MG/DL (ref 2.5–4.5)
PLATELET # BLD AUTO: 159 10*3/MM3 (ref 140–450)
PMV BLD AUTO: 10.2 FL (ref 6–12)
POTASSIUM BLD-SCNC: 3.9 MMOL/L (ref 3.5–5.1)
PROT SERPL-MCNC: 7.2 G/DL (ref 6.3–8.2)
PSA SERPL-MCNC: 0.11 NG/ML (ref 0.06–4)
RBC # BLD AUTO: 4.77 10*6/MM3 (ref 4.14–5.8)
SODIUM BLD-SCNC: 139 MMOL/L (ref 137–145)
WBC NRBC COR # BLD: 5.95 10*3/MM3 (ref 3.4–10.8)

## 2019-06-19 PROCEDURE — 96372 THER/PROPH/DIAG INJ SC/IM: CPT

## 2019-06-19 PROCEDURE — 84100 ASSAY OF PHOSPHORUS: CPT

## 2019-06-19 PROCEDURE — 36415 COLL VENOUS BLD VENIPUNCTURE: CPT

## 2019-06-19 PROCEDURE — 96401 CHEMO ANTI-NEOPL SQ/IM: CPT

## 2019-06-19 PROCEDURE — 96402 CHEMO HORMON ANTINEOPL SQ/IM: CPT

## 2019-06-19 PROCEDURE — 85025 COMPLETE CBC W/AUTO DIFF WBC: CPT

## 2019-06-19 PROCEDURE — 99214 OFFICE O/P EST MOD 30 MIN: CPT | Performed by: INTERNAL MEDICINE

## 2019-06-19 PROCEDURE — 84153 ASSAY OF PSA TOTAL: CPT

## 2019-06-19 PROCEDURE — 80053 COMPREHEN METABOLIC PANEL: CPT

## 2019-06-19 PROCEDURE — 83735 ASSAY OF MAGNESIUM: CPT

## 2019-06-19 PROCEDURE — 25010000002 LEUPROLIDE 22.5 MG KIT: Performed by: NURSE PRACTITIONER

## 2019-06-19 PROCEDURE — 25010000002 DENOSUMAB 60 MG/ML SOLUTION PREFILLED SYRINGE: Performed by: INTERNAL MEDICINE

## 2019-06-19 RX ADMIN — LEUPROLIDE ACETATE 22.5 MG: KIT SUBCUTANEOUS at 15:21

## 2019-06-19 RX ADMIN — DENOSUMAB 60 MG: 60 INJECTION SUBCUTANEOUS at 15:20

## 2019-06-19 NOTE — PROGRESS NOTES
DATE OF VISIT: 6/19/2019     REASON FOR VISIT:   1. Prostate cancer. Presented with stage I, [id number], PSA at diagnosis 3 status  post prostatectomy in 2000.  2. Relapsed disease with rise in PSA gradually over the last couple of years,  most recent PSA 7.1 ng/mL on 01/25/2016.      HISTORY OF PRESENT ILLNESS: The patient is a very pleasant 80-year-old  gentleman with past medical history significant for prostate cancer status post  radical prostatectomy in 2000. The patient never received adjuvant treatments,  neither radiation, GnRH or antiandrogen treatment. The patient's PSA started  to go up gradually over the last 2 years. Patient was referred to me on  06/13/2014 and I did restaging workup that came back negative including bone  scan, CAT scans chest, abdomen and pelvis. We elected to proceed with watchful  waiting. Patient is here today in scheduled followup visit.     SUBJECTIVE: Mr. Dietz is here today is here today by himself.  He is doing fairly well.  He did have a tick bite lesion that is gradually getting better but he was checked for Lyme disease titer that came back negative.    REVIEW OF SYSTEMS: All the other 9 systems are reviewed by me and negative  except what is mentioned in HPI and subjective.      PAST MEDICAL HISTORY/SOCIAL HISTORY/FAMILY HISTORY: Unchanged from my prior  documentation on 06/13/2014.       Current Outpatient Medications:   •  allopurinol (ZYLOPRIM) 300 MG tablet, TAKE ONE TABLET BY MOUTH DAILY, Disp: 30 tablet, Rfl: 4  •  ascorbic acid (VITAMIN C) 1000 MG tablet, Take 1,000 mg by mouth Daily., Disp: , Rfl:   •  aspirin 81 MG EC tablet, Take 81 mg by mouth Daily., Disp: , Rfl:   •  B Complex Vitamins (VITAMIN B COMPLEX PO), Take  by mouth Daily., Disp: , Rfl:   •  CRISTINA CONTOUR TEST test strip, , Disp: , Rfl:   •  Cholecalciferol (VITAMIN D3) 2000 UNITS capsule, Take  by mouth 2 (two) times a day., Disp: , Rfl:   •  Cinnamon 500 MG capsule, Take 2,000 mg by mouth Daily.,  Disp: , Rfl:   •  clindamycin (CLEOCIN) 300 MG capsule, TAKE 1 CAPSULE BY MOUTH THREE TIMES A DAY UNTIL GONE, Disp: , Rfl: 0  •  Coenzyme Q10 100 MG tablet, Take 2 tablets by mouth Daily., Disp: , Rfl:   •  fenofibric acid (TRILIPIX) 135 MG capsule delayed-release delayed release capsule, TAKE ONE CAPSULE BY MOUTH DAILY, Disp: 30 capsule, Rfl: 3  •  FLORASTOR 250 MG capsule, TAKE ONE CAPSULE BY MOUTH TWICE A DAY, Disp: 60 capsule, Rfl: 3  •  folic acid (FOLVITE) 400 MCG tablet, Take  by mouth 1 (one) time per week., Disp: , Rfl:   •  glimepiride (AMARYL) 4 MG tablet, Take 4 mg by mouth Daily. 200001, Disp: , Rfl: 5  •  hydrochlorothiazide (HYDRODIURIL) 25 MG tablet, Take 25 mg by mouth Daily., Disp: , Rfl:   •  JANUVIA 100 MG tablet, TAKE ONE TABLET BY MOUTH DAILY, Disp: 30 tablet, Rfl: 4  •  JARDIANCE 10 MG tablet, 10 mg Daily., Disp: , Rfl:   •  l-methylfolate-algae-B6-B12 (METANX) 3-90.314-2-35 MG capsule capsule, Take 1 capsule by mouth 2 (Two) Times a Day., Disp: 180 capsule, Rfl: 0  •  Lecithin 1200 MG capsule, Take  by mouth Daily., Disp: , Rfl:   •  metFORMIN (GLUCOPHAGE) 500 MG tablet, Take 2 tablets by mouth 2 (Two) Times a Day With Meals., Disp: 120 tablet, Rfl: 2  •  metoprolol succinate XL (TOPROL-XL) 100 MG 24 hr tablet, TAKE 1 TABLET BY MOUTH ONE TIME A DAY , Disp: 30 tablet, Rfl: 1  •  mupirocin (BACTROBAN) 2 % cream, Apply  topically to the appropriate area as directed 3 (Three) Times a Day., Disp: 30 g, Rfl: 0  •  neomycin-bacitracin-polymyxin (NEOSPORIN) 400-5-5000 ointment, Apply  topically to the appropriate area as directed 2 (Two) Times a Day., Disp: 1 each, Rfl: 0  •  nystatin (MYCOSTATIN) 495599 UNIT/GM powder, Apply  topically to the appropriate area as directed Daily As Needed (rash)., Disp: 60 g, Rfl: 0  •  Omega-3 Fatty Acids (FISH OIL) 1000 MG capsule capsule, Take 1,000 mg by mouth As Needed., Disp: , Rfl:   •  Omega-3 Fatty Acids (OMEGA-3 CF PO), Take  by mouth Daily., Disp: , Rfl:  "  •  pravastatin (PRAVACHOL) 40 MG tablet, TAKE ONE TABLET BY MOUTH EVERY NIGHT AT BEDTIME, Disp: 30 tablet, Rfl: 3  •  triamterene-hydrochlorothiazide (DYAZIDE) 37.5-25 MG per capsule, TAKE ONE CAPSULE BY MOUTH DAILY, Disp: 30 capsule, Rfl: 4  •  vitamin E 1000 UNIT capsule, Take 1,000 Units by mouth Daily., Disp: , Rfl:     PHYSICAL EXAMINATION:   /68   Pulse 74   Temp 97.8 °F (36.6 °C) (Temporal)   Resp 17   Ht 167.6 cm (66\")   Wt 93 kg (205 lb)   BMI 33.09 kg/m²   ECOG1  GENERAL: Age appropriate. No acute distress.   HEENT: Head atraumatic, normocephalic.   NECK: Supple. No JVD. No lymphadenopathy.   LUNGS: Clear to auscultation bilaterally. No wheezing. No rhonchi.   HEART: Regular rate and rhythm. S1, S2, no murmurs.   ABDOMEN: Soft, nontender, nondistended. Bowel sounds positive. No  hepatosplenomegaly.   EXTREMITIES: No clubbing, cyanosis, or edema.   SKIN: No rashes. No purpura.   NEUROLOGIC: Awake and oriented x3. Strength 5 out of 5 in all muscle groups.     No visits with results within 2 Week(s) from this visit.   Latest known visit with results is:   Office Visit on 04/22/2019   Component Date Value Ref Range Status   • Lyme Ab IgG/IgM 04/22/2019 <0.91  0.00 - 0.90 ISR Final    Comment:                                 Negative         <0.91                                  Equivocal  0.91 - 1.09                                  Positive         >1.09          ASSESSMENT: The patient is a very pleasant 80-year-old gentleman with relapsed  prostate cancer.     PROBLEM LIST:  1. Prostate cancer, initially presented as [id number] status post radical  prostatectomy 2000.  2. Rise in PSA gradually over the last 2 years,   3. Multiple comorbidities including type 2 diabetes, hypertension, morbid  obesity, osteoarthritis, and hypercholesterolemia.  4. Erectile dysfunction.   5.  Family history of colon cancer  6.  Osteopenia     PLAN:  1. I did go over the blood work results in detail with the " patient. I explained to him that  his last PSA on March 20, 2019 is stable at <0.064.   I will continue Eligard 22.5mg q3 month.   2. The patient will come back to see me in 3 months with repeat blood work as well as serum PSA.   3. I will continue Viagra as needed for erectile dysfunction.   4.  We'll continue Januvia as well as metformin for type 2 diabetes.  His sugars were 171 yesterday.  5.  We'll continue pravastatin for hypercholesterolemia  6.  Patient need to have 5 year follow-up colonoscopy which would be due 11/2021.  7. We reviewed the potential side effects of the treatment including hot flashes, impotence, joint pain, decrease in bone density, mood or sleep disturbance, and asthenia.  8.  I'll continue the patient on Prolia 60 mg subcutaneous every 6 month.  His next dose will be due September 2019.  We'll continue calcium with vitamin D daily.   Shannan Ramon MD  6/19/2019

## 2019-06-26 DIAGNOSIS — M1A.0710 IDIOPATHIC CHRONIC GOUT OF RIGHT FOOT WITHOUT TOPHUS: Primary | ICD-10-CM

## 2019-06-26 RX ORDER — ALLOPURINOL 300 MG/1
300 TABLET ORAL DAILY
Qty: 90 TABLET | Refills: 1 | Status: SHIPPED | OUTPATIENT
Start: 2019-06-26 | End: 2020-03-22

## 2019-06-28 RX ORDER — SITAGLIPTIN 100 MG/1
TABLET, FILM COATED ORAL
Qty: 30 TABLET | Refills: 3 | Status: SHIPPED | OUTPATIENT
Start: 2019-06-28 | End: 2019-11-04

## 2019-07-11 RX ORDER — TRIAMTERENE AND HYDROCHLOROTHIAZIDE 37.5; 25 MG/1; MG/1
CAPSULE ORAL
Qty: 30 CAPSULE | Refills: 3 | Status: SHIPPED | OUTPATIENT
Start: 2019-07-11 | End: 2020-03-02

## 2019-07-12 ENCOUNTER — OFFICE VISIT (OUTPATIENT)
Dept: INTERNAL MEDICINE | Facility: CLINIC | Age: 84
End: 2019-07-12

## 2019-07-12 VITALS
HEART RATE: 75 BPM | WEIGHT: 203.8 LBS | SYSTOLIC BLOOD PRESSURE: 120 MMHG | OXYGEN SATURATION: 97 % | DIASTOLIC BLOOD PRESSURE: 80 MMHG | BODY MASS INDEX: 32.89 KG/M2

## 2019-07-12 DIAGNOSIS — M1A.0710 CHRONIC GOUT OF RIGHT FOOT, UNSPECIFIED CAUSE: ICD-10-CM

## 2019-07-12 DIAGNOSIS — M15.9 PRIMARY OSTEOARTHRITIS INVOLVING MULTIPLE JOINTS: ICD-10-CM

## 2019-07-12 DIAGNOSIS — I35.0 AORTIC VALVE STENOSIS, ETIOLOGY OF CARDIAC VALVE DISEASE UNSPECIFIED: ICD-10-CM

## 2019-07-12 DIAGNOSIS — Z00.00 MEDICARE ANNUAL WELLNESS VISIT, SUBSEQUENT: Primary | ICD-10-CM

## 2019-07-12 DIAGNOSIS — C61 PROSTATE CANCER (HCC): ICD-10-CM

## 2019-07-12 DIAGNOSIS — E78.00 HYPERCHOLESTEREMIA: ICD-10-CM

## 2019-07-12 DIAGNOSIS — G47.33 OBSTRUCTIVE SLEEP APNEA SYNDROME: ICD-10-CM

## 2019-07-12 DIAGNOSIS — I10 ESSENTIAL HYPERTENSION: ICD-10-CM

## 2019-07-12 DIAGNOSIS — E11.649 UNCONTROLLED TYPE 2 DIABETES MELLITUS WITH HYPOGLYCEMIA WITHOUT COMA (HCC): ICD-10-CM

## 2019-07-12 LAB
POC CREATININE URINE: NORMAL
POC MICROALBUMIN URINE: NORMAL

## 2019-07-12 PROCEDURE — 82570 ASSAY OF URINE CREATININE: CPT | Performed by: INTERNAL MEDICINE

## 2019-07-12 PROCEDURE — G0439 PPPS, SUBSEQ VISIT: HCPCS | Performed by: INTERNAL MEDICINE

## 2019-07-12 PROCEDURE — 82044 UR ALBUMIN SEMIQUANTITATIVE: CPT | Performed by: INTERNAL MEDICINE

## 2019-07-12 RX ORDER — SACCHAROMYCES BOULARDII 250 MG
250 CAPSULE ORAL 2 TIMES DAILY
Qty: 60 CAPSULE | Refills: 3 | Status: SHIPPED | OUTPATIENT
Start: 2019-07-12 | End: 2019-11-04

## 2019-07-12 NOTE — PROGRESS NOTES
QUICK REFERENCE INFORMATION:  The ABCs of the Annual Wellness Visit    Subsequent Medicare Wellness Visit     HEALTH RISK ASSESSMENT    : 1935    Recent Hospitalizations:  No hospitalization(s) within the last year..  ccc      Current Medical Providers:  Patient Care Team:  Maria Fernanda Wagner DO as PCP - General (Internal Medicine)  Ha Toussaint MD as Consulting Physician (Cardiology)  Guero Grande MD as Consulting Physician (Cardiology)  Shannan Ramon MD as Consulting Physician (Hematology and Oncology)        Smoking Status:  Social History     Tobacco Use   Smoking Status Never Smoker   Smokeless Tobacco Never Used       Alcohol Consumption:  Social History     Substance and Sexual Activity   Alcohol Use No       Depression Screen:   PHQ-2/PHQ-9 Depression Screening 2019   Little interest or pleasure in doing things 1   Feeling down, depressed, or hopeless 1   Trouble falling or staying asleep, or sleeping too much 3   Feeling tired or having little energy 3   Poor appetite or overeating 1   Feeling bad about yourself - or that you are a failure or have let yourself or your family down 0   Trouble concentrating on things, such as reading the newspaper or watching television 1   Moving or speaking so slowly that other people could have noticed. Or the opposite - being so fidgety or restless that you have been moving around a lot more than usual 0   Thoughts that you would be better off dead, or of hurting yourself in some way 0   Total Score 10   If you checked off any problems, how difficult have these problems made it for you to do your work, take care of things at home, or get along with other people? Not difficult at all       Health Habits and Functional and Cognitive Screening:  Functional & Cognitive Status 2019   Do you have difficulty preparing food and eating? No   Do you have difficulty bathing yourself, getting dressed or grooming yourself? No   Do you have difficulty  using the toilet? No   Do you have difficulty moving around from place to place? No   Do you have trouble with steps or getting out of a bed or a chair? No   In the past year have you fallen or experienced a near fall? Yes   Current Diet Unhealthy Diet   Dental Exam Up to date   Eye Exam Up to date   Exercise (times per week) -   Current Exercise Activities Include No Regular Exercise   Do you need help using the phone?  No   Are you deaf or do you have serious difficulty hearing?  No   Do you need help with transportation? Yes   Do you need help shopping? No   Do you need help preparing meals?  No   Do you need help with housework?  No   Do you need help with laundry? No   Do you need help taking your medications? No   Do you need help managing money? No   Do you ever drive or ride in a car without wearing a seat belt? No   Have you felt unusual stress, anger or loneliness in the last month? Yes   Who do you live with? Other   If you need help, do you have trouble finding someone available to you? No   Have you been bothered in the last four weeks by sexual problems? No   Do you have difficulty concentrating, remembering or making decisions? No           Does the patient have evidence of cognitive impairment? No    Asiprin use counseling: Does not need ASA (and currently is not on it)      Recent Lab Results:          Lab Results   Component Value Date    CHOL 142 10/02/2018    TRIG 181 (H) 10/02/2018    HDL 45 10/02/2018           Age-appropriate Screening Schedule:  Refer to the list below for future screening recommendations based on patient's age, sex and/or medical conditions. Orders for these recommended tests are listed in the plan section. The patient has been provided with a written plan.    Health Maintenance   Topic Date Due   • URINE MICROALBUMIN  07/03/2019   • ZOSTER VACCINE (1 of 2) 02/03/2020 (Originally 8/13/1987)   • INFLUENZA VACCINE  08/01/2019   • HEMOGLOBIN A1C  09/11/2019   • PNEUMOCOCCAL  VACCINES (65+ LOW/MEDIUM RISK) (2 of 2 - PPSV23) 10/02/2019   • LIPID PANEL  10/02/2019   • TDAP/TD VACCINES (2 - Td) 04/10/2029        Subjective   History of Present Illness    Blane Dietz is a 83 y.o. male who presents for an Annual Wellness Visit.    The following portions of the patient's history were reviewed and updated as appropriate: allergies, current medications, past family history, past medical history, past social history, past surgical history and problem list.    Outpatient Medications Prior to Visit   Medication Sig Dispense Refill   • allopurinol (ZYLOPRIM) 300 MG tablet Take 1 tablet by mouth Daily. 90 tablet 1   • ascorbic acid (VITAMIN C) 1000 MG tablet Take 1,000 mg by mouth Daily.     • aspirin 81 MG EC tablet Take 81 mg by mouth Daily.     • CRISTINA CONTOUR TEST test strip      • Cholecalciferol (VITAMIN D3) 2000 UNITS capsule Take  by mouth 2 (two) times a day.     • Cinnamon 500 MG capsule Take 2,000 mg by mouth Daily.     • Coenzyme Q10 100 MG tablet Take 2 tablets by mouth Daily.     • fenofibric acid (TRILIPIX) 135 MG capsule delayed-release delayed release capsule TAKE ONE CAPSULE BY MOUTH DAILY 30 capsule 3   • glimepiride (AMARYL) 4 MG tablet Take 4 mg by mouth Daily. 200001 5   • JANUVIA 100 MG tablet TAKE ONE TABLET BY MOUTH DAILY 30 tablet 3   • JARDIANCE 10 MG tablet 10 mg Daily.     • metFORMIN (GLUCOPHAGE) 500 MG tablet Take 2 tablets by mouth 2 (Two) Times a Day With Meals. 120 tablet 2   • metoprolol succinate XL (TOPROL-XL) 100 MG 24 hr tablet TAKE 1 TABLET BY MOUTH ONE TIME A DAY  30 tablet 1   • nystatin (MYCOSTATIN) 121281 UNIT/GM powder Apply  topically to the appropriate area as directed Daily As Needed (rash). 60 g 0   • pravastatin (PRAVACHOL) 40 MG tablet TAKE ONE TABLET BY MOUTH EVERY NIGHT AT BEDTIME 30 tablet 3   • triamterene-hydrochlorothiazide (DYAZIDE) 37.5-25 MG per capsule TAKE ONE CAPSULE BY MOUTH DAILY 30 capsule 3   • FLORASTOR 250 MG capsule TAKE ONE  CAPSULE BY MOUTH TWICE A DAY 60 capsule 3   • l-methylfolate-algae-B6-B12 (METANX) 3-90.314-2-35 MG capsule capsule Take 1 capsule by mouth 2 (Two) Times a Day. 180 capsule 0   • neomycin-bacitracin-polymyxin (NEOSPORIN) 400-5-5000 ointment Apply  topically to the appropriate area as directed 2 (Two) Times a Day. 1 each 0   • Lecithin 1200 MG capsule Take  by mouth Daily.     • B Complex Vitamins (VITAMIN B COMPLEX PO) Take  by mouth Daily.     • clindamycin (CLEOCIN) 300 MG capsule TAKE 1 CAPSULE BY MOUTH THREE TIMES A DAY UNTIL GONE  0   • folic acid (FOLVITE) 400 MCG tablet Take  by mouth 1 (one) time per week.     • mupirocin (BACTROBAN) 2 % cream Apply  topically to the appropriate area as directed 3 (Three) Times a Day. 30 g 0   • Omega-3 Fatty Acids (FISH OIL) 1000 MG capsule capsule Take 1,000 mg by mouth As Needed.     • Omega-3 Fatty Acids (OMEGA-3 CF PO) Take  by mouth Daily.     • vitamin E 1000 UNIT capsule Take 1,000 Units by mouth Daily.       No facility-administered medications prior to visit.        Patient Active Problem List   Diagnosis   • Chronic gout of right foot   • Essential hypertension   • Osteoarthritis   • Uncontrolled type 2 diabetes mellitus (CMS/HCC)   • Prostate cancer (CMS/HCC)   • Morbid obesity (CMS/HCC)   • Hypercholesteremia   • Aortic stenosis   • Sleep apnea   • Exogenous obesity   • Bilateral impacted cerumen   • Urinary frequency       Advance Care Planning:  Patient has an advance directive - a copy has been provided and is visible in patient header    Identification of Risk Factors:  Risk factors include: Obesity/Overweight .    Review of Systems   Constitutional: Negative for activity change, appetite change, chills, diaphoresis, fatigue, fever and unexpected weight change.   HENT: Negative for congestion, dental problem, drooling, ear discharge, ear pain, facial swelling, hearing loss, mouth sores, nosebleeds, postnasal drip, rhinorrhea, sinus pressure, sneezing, sore  throat, tinnitus, trouble swallowing and voice change.    Eyes: Negative for photophobia, pain, discharge, itching and visual disturbance.   Respiratory: Negative for cough, choking, chest tightness, shortness of breath, wheezing and stridor.    Cardiovascular: Negative for palpitations and leg swelling.   Gastrointestinal: Negative for abdominal distention, abdominal pain, anal bleeding, blood in stool, constipation, diarrhea, nausea and vomiting.   Endocrine: Negative for cold intolerance, heat intolerance, polydipsia and polyphagia.   Genitourinary: Negative for decreased urine volume, discharge, dysuria, enuresis, flank pain, frequency, genital sores, hematuria, scrotal swelling, testicular pain and urgency.   Musculoskeletal: Negative for arthralgias, back pain, gait problem, joint swelling, myalgias, neck pain and neck stiffness.   Skin: Negative for color change, pallor, rash and wound.   Allergic/Immunologic: Negative for environmental allergies, food allergies and immunocompromised state.   Neurological: Negative for dizziness, tremors, seizures, syncope, facial asymmetry, speech difficulty, weakness, light-headedness, numbness and headaches.   Hematological: Negative for adenopathy. Does not bruise/bleed easily.   Psychiatric/Behavioral: Negative for agitation, behavioral problems, confusion, dysphoric mood, hallucinations, sleep disturbance and suicidal ideas. The patient is not nervous/anxious and is not hyperactive.        Compared to one year ago, the patient feels his physical health is the same.  Compared to one year ago, the patient feels his mental health is the same.  /80   Pulse 75   Wt 92.4 kg (203 lb 12.8 oz)   SpO2 97%   BMI 32.89 kg/m²     Objective     Physical Exam   Constitutional: He is oriented to person, place, and time. He appears well-developed and well-nourished.   HENT:   Head: Normocephalic and atraumatic.   Right Ear: External ear normal.   Left Ear: External ear normal.    Nose: Nose normal.   Mouth/Throat: Oropharynx is clear and moist.   Eyes: Conjunctivae and EOM are normal. Pupils are equal, round, and reactive to light.   Neck: Normal range of motion. Neck supple. No JVD present. No thyromegaly present.   Cardiovascular: Normal rate, regular rhythm, normal heart sounds and intact distal pulses. Exam reveals no gallop and no friction rub.   No murmur heard.  Pulmonary/Chest: Effort normal and breath sounds normal. No respiratory distress. He has no wheezes. He has no rales. He exhibits no tenderness.   Abdominal: Soft. Bowel sounds are normal. He exhibits no distension and no mass. There is no tenderness. There is no rebound and no guarding. No hernia.   Genitourinary: Rectum normal, prostate normal and penis normal.   Lymphadenopathy:     He has no cervical adenopathy.   Neurological: He is alert and oriented to person, place, and time. He displays normal reflexes. No cranial nerve deficit. He exhibits normal muscle tone. Coordination normal.   Skin: No rash noted. No erythema. No pallor.   Psychiatric: He has a normal mood and affect.       Vitals:    07/12/19 1342   BP: 120/80   Pulse: 75   SpO2: 97%   Weight: 92.4 kg (203 lb 12.8 oz)       Patient's Body mass index is 32.89 kg/m². BMI is above normal parameters. Recommendations include: educational material and exercise counseling.      Assessment/Plan   Patient Self-Management and Personalized Health Advice  The patient has been provided with information about: diet, exercise and fall prevention and preventive services including:   · Fall Risk assessment done, Fall Risk plan of care done.    Visit Diagnoses:    ICD-10-CM ICD-9-CM   1. Medicare annual wellness visit, subsequent Z00.00 V70.0   2. Aortic valve stenosis, etiology of cardiac valve disease unspecified I35.0 424.1   3. Essential hypertension I10 401.9   4. Hypercholesteremia E78.00 272.0   5. Obstructive sleep apnea syndrome G47.33 327.23   6. Uncontrolled type 2  diabetes mellitus with hypoglycemia without coma (CMS/[facility]) E11.649 250.82     251.2   7. Prostate cancer (CMS/[facility]) C61 185   8. Primary osteoarthritis involving multiple joints M15.0 715.09   9. Chronic gout of right foot, unspecified cause [id number].0710 274.02   1. Done today  2. Stable  3. Stable  4. Lipids  5. Stable on cpap  6. Follows with Endo  7. Stable  8. Stable  9. stable    No orders of the defined types were placed in this encounter.      Outpatient Encounter Medications as of 7/12/2019   Medication Sig Dispense Refill   • allopurinol (ZYLOPRIM) 300 MG tablet Take 1 tablet by mouth Daily. 90 tablet 1   • ascorbic acid (VITAMIN C) 1000 MG tablet Take 1,000 mg by mouth Daily.     • aspirin 81 MG EC tablet Take 81 mg by mouth Daily.     • CRISTINA CONTOUR TEST test strip      • Cholecalciferol (VITAMIN D3) 2000 UNITS capsule Take  by mouth 2 (two) times a day.     • Cinnamon 500 MG capsule Take 2,000 mg by mouth Daily.     • Coenzyme Q10 100 MG tablet Take 2 tablets by mouth Daily.     • fenofibric acid (TRILIPIX) 135 MG capsule delayed-release delayed release capsule TAKE ONE CAPSULE BY MOUTH DAILY 30 capsule 3   • glimepiride (AMARYL) 4 MG tablet Take 4 mg by mouth Daily. 200001  5   • JANUVIA 100 MG tablet TAKE ONE TABLET BY MOUTH DAILY 30 tablet 3   • JARDIANCE 10 MG tablet 10 mg Daily.     • metFORMIN (GLUCOPHAGE) 500 MG tablet Take 2 tablets by mouth 2 (Two) Times a Day With Meals. 120 tablet 2   • metoprolol succinate XL (TOPROL-XL) 100 MG 24 hr tablet TAKE 1 TABLET BY MOUTH ONE TIME A DAY  30 tablet 1   • nystatin (MYCOSTATIN) 905938 UNIT/GM powder Apply  topically to the appropriate area as directed Daily As Needed (rash). 60 g 0   • pravastatin (PRAVACHOL) 40 MG tablet TAKE ONE TABLET BY MOUTH EVERY NIGHT AT BEDTIME 30 tablet 3   • saccharomyces boulardii (FLORASTOR) 250 MG capsule Take 1 capsule by mouth 2 (Two) Times a Day. 60 capsule 3   • triamterene-hydrochlorothiazide (DYAZIDE) 37.5-25 MG per  capsule TAKE ONE CAPSULE BY MOUTH DAILY 30 capsule 3   • [DISCONTINUED] FLORASTOR 250 MG capsule TAKE ONE CAPSULE BY MOUTH TWICE A DAY 60 capsule 3   • [DISCONTINUED] l-methylfolate-algae-B6-B12 (METANX) 3-90.314-2-35 MG capsule capsule Take 1 capsule by mouth 2 (Two) Times a Day. 180 capsule 0   • [DISCONTINUED] neomycin-bacitracin-polymyxin (NEOSPORIN) 400-5-5000 ointment Apply  topically to the appropriate area as directed 2 (Two) Times a Day. 1 each 0   • Lecithin 1200 MG capsule Take  by mouth Daily.     • [DISCONTINUED] B Complex Vitamins (VITAMIN B COMPLEX PO) Take  by mouth Daily.     • [DISCONTINUED] clindamycin (CLEOCIN) 300 MG capsule TAKE 1 CAPSULE BY MOUTH THREE TIMES A DAY UNTIL GONE  0   • [DISCONTINUED] folic acid (FOLVITE) 400 MCG tablet Take  by mouth 1 (one) time per week.     • [DISCONTINUED] mupirocin (BACTROBAN) 2 % cream Apply  topically to the appropriate area as directed 3 (Three) Times a Day. 30 g 0   • [DISCONTINUED] Omega-3 Fatty Acids (FISH OIL) 1000 MG capsule capsule Take 1,000 mg by mouth As Needed.     • [DISCONTINUED] Omega-3 Fatty Acids (OMEGA-3 CF PO) Take  by mouth Daily.     • [DISCONTINUED] vitamin E 1000 UNIT capsule Take 1,000 Units by mouth Daily.       No facility-administered encounter medications on file as of 7/12/2019.        Reviewed use of high risk medication in the elderly: yes  Reviewed for potential of harmful drug interactions in the elderly: yes    Follow Up:  No Follow-up on file.     An After Visit Summary and PPPS with all of these plans were given to the patient.

## 2019-09-04 ENCOUNTER — OFFICE VISIT (OUTPATIENT)
Dept: INTERNAL MEDICINE | Facility: CLINIC | Age: 84
End: 2019-09-04

## 2019-09-04 VITALS
DIASTOLIC BLOOD PRESSURE: 80 MMHG | HEART RATE: 88 BPM | WEIGHT: 206.8 LBS | BODY MASS INDEX: 33.38 KG/M2 | TEMPERATURE: 98 F | OXYGEN SATURATION: 98 % | SYSTOLIC BLOOD PRESSURE: 140 MMHG

## 2019-09-04 DIAGNOSIS — M15.3 OTHER SECONDARY OSTEOARTHRITIS OF MULTIPLE SITES: ICD-10-CM

## 2019-09-04 DIAGNOSIS — E78.00 HYPERCHOLESTEREMIA: ICD-10-CM

## 2019-09-04 DIAGNOSIS — I10 ESSENTIAL HYPERTENSION: Primary | ICD-10-CM

## 2019-09-04 DIAGNOSIS — C61 PROSTATE CANCER (HCC): ICD-10-CM

## 2019-09-04 PROCEDURE — 99213 OFFICE O/P EST LOW 20 MIN: CPT | Performed by: INTERNAL MEDICINE

## 2019-09-04 NOTE — PROGRESS NOTES
Subjective   Blane Dietz is a 84 y.o. male.   No chief complaint on file.      Hypertension   This is a chronic problem. The current episode started more than 1 year ago. Pertinent negatives include no chest pain, headaches, neck pain, palpitations or shortness of breath.   Osteoarthritis   Pertinent negatives include no diarrhea, dysuria, fatigue, fever or rash. His past medical history is significant for osteoarthritis.   Hyperlipidemia   This is a chronic problem. The current episode started more than 1 year ago. Pertinent negatives include no chest pain, myalgias or shortness of breath.        Prostate cancer and is following with onc.  Seen at  for cough. Given claritin and in haler and better.  The following portions of the patient's history were reviewed and updated as appropriate: allergies, current medications, past family history, past medical history, past social history, past surgical history and problem list.    Review of Systems   Constitutional: Negative for activity change, appetite change, chills, diaphoresis, fatigue, fever and unexpected weight change.   HENT: Negative for congestion, ear discharge, ear pain, mouth sores, nosebleeds, sinus pressure, sneezing and sore throat.    Eyes: Negative for pain, discharge and itching.   Respiratory: Negative for cough, chest tightness, shortness of breath and wheezing.    Cardiovascular: Negative for chest pain, palpitations and leg swelling.   Gastrointestinal: Negative for abdominal pain, constipation, diarrhea, nausea and vomiting.   Endocrine: Negative for cold intolerance, heat intolerance, polydipsia and polyphagia.   Genitourinary: Negative for dysuria, flank pain, frequency, hematuria and urgency.   Musculoskeletal: Negative for arthralgias, back pain, gait problem, myalgias, neck pain and neck stiffness.   Skin: Negative for color change, pallor and rash.   Neurological: Negative for seizures, speech difficulty, numbness and headaches.    Psychiatric/Behavioral: Negative for agitation, confusion, decreased concentration and sleep disturbance. The patient is not nervous/anxious.      /80   Pulse 88   Temp 98 °F (36.7 °C)   Wt 93.8 kg (206 lb 12.8 oz)   SpO2 98%   BMI 33.38 kg/m²     Objective   Physical Exam   Constitutional: He appears well-developed.   HENT:   Head: Normocephalic.   Right Ear: External ear normal.   Left Ear: External ear normal.   Nose: Nose normal.   Mouth/Throat: Oropharynx is clear and moist.   Eyes: Conjunctivae are normal. Pupils are equal, round, and reactive to light.   Neck: No JVD present. No thyromegaly present.   Cardiovascular: Normal rate, regular rhythm and normal heart sounds. Exam reveals no friction rub.   No murmur heard.  Pulmonary/Chest: Effort normal and breath sounds normal. No respiratory distress. He has no wheezes. He has no rales.   Abdominal: Soft. Bowel sounds are normal. He exhibits no distension. There is no tenderness. There is no guarding.   Musculoskeletal: He exhibits no edema or tenderness.   Lymphadenopathy:     He has no cervical adenopathy.   Neurological: He displays normal reflexes. No cranial nerve deficit.   Skin: No rash noted.   Psychiatric: His behavior is normal.   Nursing note and vitals reviewed.      Assessment/Plan   Blane was seen today for hypertension and osteoarthritis.    Diagnoses and all orders for this visit:    Essential hypertension  stable  Hypercholesteremia  Osteoarthritis  stable  Prostate cancer  Followed by onc. PSA trending back down.   DM  Follows with Endo regarding his blood sugars.

## 2019-09-18 ENCOUNTER — INFUSION (OUTPATIENT)
Dept: ONCOLOGY | Facility: HOSPITAL | Age: 84
End: 2019-09-18

## 2019-09-18 VITALS
RESPIRATION RATE: 18 BRPM | BODY MASS INDEX: 32.77 KG/M2 | HEART RATE: 83 BPM | SYSTOLIC BLOOD PRESSURE: 146 MMHG | WEIGHT: 203 LBS | DIASTOLIC BLOOD PRESSURE: 71 MMHG | TEMPERATURE: 98.1 F

## 2019-09-18 DIAGNOSIS — C61 PROSTATE CANCER (HCC): Primary | ICD-10-CM

## 2019-09-18 LAB
ALBUMIN SERPL-MCNC: 4.3 G/DL (ref 3.5–5.2)
ALBUMIN/GLOB SERPL: 1.6 G/DL
ALP SERPL-CCNC: 40 U/L (ref 39–117)
ALT SERPL W P-5'-P-CCNC: 31 U/L (ref 1–41)
ANION GAP SERPL CALCULATED.3IONS-SCNC: 15 MMOL/L (ref 5–15)
AST SERPL-CCNC: 29 U/L (ref 1–40)
BASOPHILS # BLD AUTO: 0.04 10*3/MM3 (ref 0–0.2)
BASOPHILS NFR BLD AUTO: 0.9 % (ref 0–1.5)
BILIRUB SERPL-MCNC: 0.6 MG/DL (ref 0.2–1.2)
BUN BLD-MCNC: 12 MG/DL (ref 8–23)
BUN/CREAT SERPL: 14.5 (ref 7–25)
CALCIUM SPEC-SCNC: 9.4 MG/DL (ref 8.6–10.5)
CHLORIDE SERPL-SCNC: 99 MMOL/L (ref 98–107)
CO2 SERPL-SCNC: 25 MMOL/L (ref 22–29)
CREAT BLD-MCNC: 0.83 MG/DL (ref 0.76–1.27)
DEPRECATED RDW RBC AUTO: 43.5 FL (ref 37–54)
EOSINOPHIL # BLD AUTO: 0.19 10*3/MM3 (ref 0–0.4)
EOSINOPHIL NFR BLD AUTO: 4.4 % (ref 0.3–6.2)
ERYTHROCYTE [DISTWIDTH] IN BLOOD BY AUTOMATED COUNT: 13.2 % (ref 12.3–15.4)
GFR SERPL CREATININE-BSD FRML MDRD: 89 ML/MIN/1.73
GLOBULIN UR ELPH-MCNC: 2.7 GM/DL
GLUCOSE BLD-MCNC: 346 MG/DL (ref 65–99)
HCT VFR BLD AUTO: 42.9 % (ref 37.5–51)
HGB BLD-MCNC: 13.7 G/DL (ref 13–17.7)
IMM GRANULOCYTES # BLD AUTO: 0.02 10*3/MM3 (ref 0–0.05)
IMM GRANULOCYTES NFR BLD AUTO: 0.5 % (ref 0–0.5)
LYMPHOCYTES # BLD AUTO: 1.27 10*3/MM3 (ref 0.7–3.1)
LYMPHOCYTES NFR BLD AUTO: 29.3 % (ref 19.6–45.3)
MCH RBC QN AUTO: 28.7 PG (ref 26.6–33)
MCHC RBC AUTO-ENTMCNC: 31.9 G/DL (ref 31.5–35.7)
MCV RBC AUTO: 89.9 FL (ref 79–97)
MONOCYTES # BLD AUTO: 0.37 10*3/MM3 (ref 0.1–0.9)
MONOCYTES NFR BLD AUTO: 8.5 % (ref 5–12)
NEUTROPHILS # BLD AUTO: 2.45 10*3/MM3 (ref 1.7–7)
NEUTROPHILS NFR BLD AUTO: 56.4 % (ref 42.7–76)
NRBC BLD AUTO-RTO: 0 /100 WBC (ref 0–0.2)
PLATELET # BLD AUTO: 142 10*3/MM3 (ref 140–450)
PMV BLD AUTO: 10.1 FL (ref 6–12)
POTASSIUM BLD-SCNC: 4.4 MMOL/L (ref 3.5–5.2)
PROT SERPL-MCNC: 7 G/DL (ref 6–8.5)
PSA SERPL-MCNC: 0.16 NG/ML (ref 0–4)
RBC # BLD AUTO: 4.77 10*6/MM3 (ref 4.14–5.8)
SODIUM BLD-SCNC: 139 MMOL/L (ref 136–145)
WBC NRBC COR # BLD: 4.34 10*3/MM3 (ref 3.4–10.8)

## 2019-09-18 PROCEDURE — 85025 COMPLETE CBC W/AUTO DIFF WBC: CPT

## 2019-09-18 PROCEDURE — 36415 COLL VENOUS BLD VENIPUNCTURE: CPT

## 2019-09-18 PROCEDURE — 84153 ASSAY OF PSA TOTAL: CPT

## 2019-09-18 PROCEDURE — 25010000002 LEUPROLIDE 22.5 MG KIT: Performed by: NURSE PRACTITIONER

## 2019-09-18 PROCEDURE — 96402 CHEMO HORMON ANTINEOPL SQ/IM: CPT

## 2019-09-18 PROCEDURE — 96372 THER/PROPH/DIAG INJ SC/IM: CPT

## 2019-09-18 PROCEDURE — 80053 COMPREHEN METABOLIC PANEL: CPT

## 2019-09-18 RX ADMIN — LEUPROLIDE ACETATE 22.5 MG: KIT SUBCUTANEOUS at 15:16

## 2019-09-25 ENCOUNTER — OFFICE VISIT (OUTPATIENT)
Dept: ONCOLOGY | Facility: CLINIC | Age: 84
End: 2019-09-25

## 2019-09-25 VITALS
HEART RATE: 91 BPM | DIASTOLIC BLOOD PRESSURE: 74 MMHG | HEIGHT: 66 IN | BODY MASS INDEX: 33.11 KG/M2 | SYSTOLIC BLOOD PRESSURE: 161 MMHG | TEMPERATURE: 97.3 F | WEIGHT: 206 LBS | RESPIRATION RATE: 18 BRPM

## 2019-09-25 DIAGNOSIS — C61 PROSTATE CANCER (HCC): Primary | ICD-10-CM

## 2019-09-25 PROCEDURE — 99214 OFFICE O/P EST MOD 30 MIN: CPT | Performed by: INTERNAL MEDICINE

## 2019-09-25 NOTE — PROGRESS NOTES
DATE OF VISIT: 9/25/2019     REASON FOR VISIT:   1. Prostate cancer. Presented with stage I, [id number], PSA at diagnosis 3 status  post prostatectomy in 2000.  2. Relapsed disease with rise in PSA gradually over the last couple of years,  most recent PSA 7.1 ng/mL on 01/25/2016.      HISTORY OF PRESENT ILLNESS: The patient is a very pleasant 80-year-old  gentleman with past medical history significant for prostate cancer status post  radical prostatectomy in 2000. The patient never received adjuvant treatments,  neither radiation, GnRH or antiandrogen treatment. The patient's PSA started  to go up gradually over the last 2 years. Patient was referred to me on  06/13/2014 and I did restaging workup that came back negative including bone  scan, CAT scans chest, abdomen and pelvis. We elected to proceed with watchful  waiting. Patient is here today in scheduled followup visit.     SUBJECTIVE: Mr. Dietz is here today is here today by himself.  Since his last visit he suffered from upper airway infection that required a visit to urgent care clinic symptoms resolved within few weeks he had to use an inhaler.  Today he feels fine he denies any back pain no problems urinating.  He is excited about a [employer] concert the attending in [place].    REVIEW OF SYSTEMS: All the other 9 systems are reviewed by me and negative  except what is mentioned in HPI and subjective.      PAST MEDICAL HISTORY/SOCIAL HISTORY/FAMILY HISTORY: Unchanged from my prior  documentation on 06/13/2014.       Current Outpatient Medications:   •  albuterol sulfate  (90 Base) MCG/ACT inhaler, Inhale 2 puffs Every 6 (Six) Hours As Needed for Wheezing., Disp: 1 inhaler, Rfl: 0  •  allopurinol (ZYLOPRIM) 300 MG tablet, Take 1 tablet by mouth Daily., Disp: 90 tablet, Rfl: 1  •  ascorbic acid (VITAMIN C) 1000 MG tablet, Take 1,000 mg by mouth Daily., Disp: , Rfl:   •  aspirin 81 MG EC tablet, Take 81 mg by mouth Daily., Disp: , Rfl:   •  CRISTINA CONTOUR  "TEST test strip, , Disp: , Rfl:   •  Cholecalciferol (VITAMIN D3) 2000 UNITS capsule, Take  by mouth 2 (two) times a day., Disp: , Rfl:   •  Cinnamon 500 MG capsule, Take 2,000 mg by mouth Daily., Disp: , Rfl:   •  Coenzyme Q10 100 MG tablet, Take 2 tablets by mouth Daily., Disp: , Rfl:   •  fenofibric acid (TRILIPIX) 135 MG capsule delayed-release delayed release capsule, TAKE ONE CAPSULE BY MOUTH DAILY, Disp: 30 capsule, Rfl: 3  •  glimepiride (AMARYL) 4 MG tablet, Take 4 mg by mouth Daily. 200001, Disp: , Rfl: 5  •  JANUVIA 100 MG tablet, TAKE ONE TABLET BY MOUTH DAILY, Disp: 30 tablet, Rfl: 3  •  JARDIANCE 10 MG tablet, 10 mg Daily., Disp: , Rfl:   •  Lecithin 1200 MG capsule, Take  by mouth Daily., Disp: , Rfl:   •  metFORMIN (GLUCOPHAGE) 500 MG tablet, Take 2 tablets by mouth 2 (Two) Times a Day With Meals., Disp: 120 tablet, Rfl: 2  •  metoprolol succinate XL (TOPROL-XL) 100 MG 24 hr tablet, TAKE 1 TABLET BY MOUTH ONE TIME A DAY , Disp: 30 tablet, Rfl: 1  •  nystatin (MYCOSTATIN) 218585 UNIT/GM powder, Apply  topically to the appropriate area as directed Daily As Needed (rash)., Disp: 60 g, Rfl: 0  •  pravastatin (PRAVACHOL) 40 MG tablet, TAKE ONE TABLET BY MOUTH EVERY NIGHT AT BEDTIME, Disp: 30 tablet, Rfl: 3  •  saccharomyces boulardii (FLORASTOR) 250 MG capsule, Take 1 capsule by mouth 2 (Two) Times a Day., Disp: 60 capsule, Rfl: 3  •  triamterene-hydrochlorothiazide (DYAZIDE) 37.5-25 MG per capsule, TAKE ONE CAPSULE BY MOUTH DAILY, Disp: 30 capsule, Rfl: 3    PHYSICAL EXAMINATION:   /74   Pulse 91   Temp 97.3 °F (36.3 °C) (Temporal)   Resp 18   Ht 167.6 cm (66\")   Wt 93.4 kg (206 lb)   BMI 33.25 kg/m²   ECOG1  GENERAL: Age appropriate. No acute distress.   HEENT: Head atraumatic, normocephalic.   NECK: Supple. No JVD. No lymphadenopathy.   LUNGS: Clear to auscultation bilaterally. No wheezing. No rhonchi.   HEART: Regular rate and rhythm. S1, S2, no murmurs.   ABDOMEN: Soft, nontender, " nondistended. Bowel sounds positive. No  hepatosplenomegaly.   EXTREMITIES: No clubbing, cyanosis, or edema.   SKIN: No rashes. No purpura.   NEUROLOGIC: Awake and oriented x3. Strength 5 out of 5 in all muscle groups.     Infusion on 09/18/2019   Component Date Value Ref Range Status   • PSA 09/18/2019 0.163  0.000 - 4.000 ng/mL Final   • Glucose 09/18/2019 346* 65 - 99 mg/dL Final    Glucose >180, Hemoglobin A1C recommended.   • BUN 09/18/2019 12  8 - 23 mg/dL Final   • Creatinine 09/18/2019 0.83  0.76 - 1.27 mg/dL Final   • Sodium 09/18/2019 139  136 - 145 mmol/L Final   • Potassium 09/18/2019 4.4  3.5 - 5.2 mmol/L Final   • Chloride 09/18/2019 99  98 - 107 mmol/L Final   • CO2 09/18/2019 25.0  22.0 - 29.0 mmol/L Final   • Calcium 09/18/2019 9.4  8.6 - 10.5 mg/dL Final   • Total Protein 09/18/2019 7.0  6.0 - 8.5 g/dL Final   • Albumin 09/18/2019 4.30  3.50 - 5.20 g/dL Final   • ALT (SGPT) 09/18/2019 31  1 - 41 U/L Final   • AST (SGOT) 09/18/2019 29  1 - 40 U/L Final   • Alkaline Phosphatase 09/18/2019 40  39 - 117 U/L Final   • Total Bilirubin 09/18/2019 0.6  0.2 - 1.2 mg/dL Final   • eGFR Non  Amer 09/18/2019 89  >60 mL/min/1.73 Final   • Globulin 09/18/2019 2.7  gm/dL Final   • A/G Ratio 09/18/2019 1.6  g/dL Final   • BUN/Creatinine Ratio 09/18/2019 14.5  7.0 - 25.0 Final   • Anion Gap 09/18/2019 15.0  5.0 - 15.0 mmol/L Final   • WBC 09/18/2019 4.34  3.40 - 10.80 10*3/mm3 Final   • RBC 09/18/2019 4.77  4.14 - 5.80 10*6/mm3 Final   • Hemoglobin 09/18/2019 13.7  13.0 - 17.7 g/dL Final   • Hematocrit 09/18/2019 42.9  37.5 - 51.0 % Final   • MCV 09/18/2019 89.9  79.0 - 97.0 fL Final   • MCH 09/18/2019 28.7  26.6 - 33.0 pg Final   • MCHC 09/18/2019 31.9  31.5 - 35.7 g/dL Final   • RDW 09/18/2019 13.2  12.3 - 15.4 % Final   • RDW-SD 09/18/2019 43.5  37.0 - 54.0 fl Final   • MPV 09/18/2019 10.1  6.0 - 12.0 fL Final   • Platelets 09/18/2019 142  140 - 450 10*3/mm3 Final   • Neutrophil % 09/18/2019 56.4  42.7 -  76.0 % Final   • Lymphocyte % 09/18/2019 29.3  19.6 - 45.3 % Final   • Monocyte % 09/18/2019 8.5  5.0 - 12.0 % Final   • Eosinophil % 09/18/2019 4.4  0.3 - 6.2 % Final   • Basophil % 09/18/2019 0.9  0.0 - 1.5 % Final   • Immature Grans % 09/18/2019 0.5  0.0 - 0.5 % Final   • Neutrophils, Absolute 09/18/2019 2.45  1.70 - 7.00 10*3/mm3 Final   • Lymphocytes, Absolute 09/18/2019 1.27  0.70 - 3.10 10*3/mm3 Final   • Monocytes, Absolute 09/18/2019 0.37  0.10 - 0.90 10*3/mm3 Final   • Eosinophils, Absolute 09/18/2019 0.19  0.00 - 0.40 10*3/mm3 Final   • Basophils, Absolute 09/18/2019 0.04  0.00 - 0.20 10*3/mm3 Final   • Immature Grans, Absolute 09/18/2019 0.02  0.00 - 0.05 10*3/mm3 Final   • nRBC 09/18/2019 0.0  0.0 - 0.2 /100 WBC Final        ASSESSMENT: The patient is a very pleasant 80-year-old gentleman with relapsed  prostate cancer.     PROBLEM LIST:  1. Prostate cancer, initially presented as [id number] status post radical  prostatectomy 2000.  2. Rise in PSA gradually over the last 2 years,   3. Multiple comorbidities including type 2 diabetes, hypertension, morbid  obesity, osteoarthritis, and hypercholesterolemia.  4. Erectile dysfunction.   5.  Family history of colon cancer  6.  Osteopenia     PLAN:  1. I did go over the blood work results in detail with the patient. I explained to him that  his last PSA on September 18, 2019 is stable at 0.163.   I will continue Eligard 22.5mg q3 month.   2. The patient will come back to see me in 3 months with repeat blood work as well as serum PSA.   3. I will continue Viagra as needed for erectile dysfunction.   4.  We'll continue Januvia as well as metformin for type 2 diabetes.  His sugars were 171 yesterday.  5.  We'll continue pravastatin for hypercholesterolemia  6.  Patient need to have 5 year follow-up colonoscopy which would be due 11/2021.  7. We reviewed the potential side effects of the treatment including hot flashes, impotence, joint pain, decrease in bone  density, mood or sleep disturbance, and asthenia.  8.  I'll continue the patient on Prolia 60 mg subcutaneous every 6 month.  His next dose will be due September 2019.  We'll continue calcium with vitamin D daily.   Shannan Ramon MD  9/25/2019

## 2019-11-04 ENCOUNTER — OFFICE VISIT (OUTPATIENT)
Dept: CARDIOLOGY | Facility: CLINIC | Age: 84
End: 2019-11-04

## 2019-11-04 VITALS
BODY MASS INDEX: 33.43 KG/M2 | DIASTOLIC BLOOD PRESSURE: 78 MMHG | HEART RATE: 76 BPM | HEIGHT: 66 IN | WEIGHT: 208 LBS | OXYGEN SATURATION: 94 % | SYSTOLIC BLOOD PRESSURE: 134 MMHG

## 2019-11-04 DIAGNOSIS — E78.00 HYPERCHOLESTEREMIA: ICD-10-CM

## 2019-11-04 DIAGNOSIS — G47.33 OBSTRUCTIVE SLEEP APNEA SYNDROME: ICD-10-CM

## 2019-11-04 DIAGNOSIS — Z95.2 S/P AVR: ICD-10-CM

## 2019-11-04 DIAGNOSIS — I35.0 AORTIC VALVE STENOSIS, ETIOLOGY OF CARDIAC VALVE DISEASE UNSPECIFIED: ICD-10-CM

## 2019-11-04 DIAGNOSIS — I10 ESSENTIAL HYPERTENSION: Primary | ICD-10-CM

## 2019-11-04 PROCEDURE — 99213 OFFICE O/P EST LOW 20 MIN: CPT | Performed by: NURSE PRACTITIONER

## 2019-11-04 RX ORDER — DULAGLUTIDE 0.75 MG/.5ML
INJECTION, SOLUTION SUBCUTANEOUS
COMMUNITY
Start: 2019-10-30 | End: 2021-03-08 | Stop reason: SDUPTHER

## 2019-11-04 RX ORDER — PRAVASTATIN SODIUM 40 MG
TABLET ORAL
Qty: 30 TABLET | Refills: 2 | Status: SHIPPED | OUTPATIENT
Start: 2019-11-04 | End: 2020-02-05

## 2019-11-04 NOTE — PATIENT INSTRUCTIONS
Follow up 6 months  Recommend aerobic work out 3 times weekly for one hour at a time.  Referral cardiac referral

## 2019-11-04 NOTE — PROGRESS NOTES
"Blane Dietz is a 84 y.o. male.  MRN #: [id number]    Referring Provider: Maria Fernanda Wagner MD    Chief Complaint:   Chief Complaint   Patient presents with   • Follow-up   • Hypertension        History of Present Illness:  Mr. Dietz is an 84-year-old gentleman who presents for follow-up for his hypertension and for his aortic valve stenosis, aortic valve repair with ROSS procedure.  With patient's evaluation today he reports no untoward symptoms of chest pain, shortness of breath, palpitations, near syncopal episodes, fluid retention or weight increase.  Patient monitors his blood pressure on a daily basis and states that it stays within normal limits.   Mr. Dietz does express a desire to participate in an exercise regimen for weight loss but is uncertain as to what he \"can do and tolerate\" related to his cardiac issues.    The patient presents today with their self who contributes to the history of their care.     The following portions of the patient's history were reviewed and updated as appropriate: allergies, current medications, past family history, past medical history, past social history, past surgical history and problem list.     Review of Systems:     Review of Systems   Constitutional: Positive for fatigue. Negative for activity change, appetite change, diaphoresis, unexpected weight gain and unexpected weight loss.   HENT: Negative.    Eyes: Negative.  Negative for blurred vision, double vision, photophobia and visual disturbance.   Respiratory: Positive for shortness of breath. Negative for apnea, cough, chest tightness and wheezing.    Cardiovascular: Positive for chest pain. Negative for palpitations and leg swelling.   Gastrointestinal: Negative.  Negative for abdominal distention, abdominal pain, blood in stool, nausea, vomiting, GERD and indigestion.   Endocrine: Negative.  Negative for cold intolerance, heat intolerance, polydipsia, polyphagia and polyuria.   Genitourinary: Negative.  Negative " for decreased libido, frequency, genital sores, hematuria and urgency.   Musculoskeletal: Positive for arthralgias. Negative for back pain, joint swelling, neck pain and neck stiffness.   Skin: Negative.  Negative for color change, pallor, rash and bruise.   Allergic/Immunologic: Negative.    Neurological: Positive for weakness. Negative for dizziness, tremors, seizures, syncope, facial asymmetry, speech difficulty, light-headedness, numbness, headache, memory problem and confusion.   Hematological: Negative.  Negative for adenopathy. Does not bruise/bleed easily.   Psychiatric/Behavioral: Negative.  Negative for agitation, decreased concentration, sleep disturbance and stress. The patient is not nervous/anxious.    All other systems reviewed and are negative.         Current Outpatient Medications:   •  albuterol sulfate  (90 Base) MCG/ACT inhaler, Inhale 2 puffs Every 6 (Six) Hours As Needed for Wheezing., Disp: 1 inhaler, Rfl: 0  •  allopurinol (ZYLOPRIM) 300 MG tablet, Take 1 tablet by mouth Daily., Disp: 90 tablet, Rfl: 1  •  aspirin 81 MG EC tablet, Take 81 mg by mouth Daily., Disp: , Rfl:   •  CRISTINA CONTOUR TEST test strip, , Disp: , Rfl:   •  Cholecalciferol (VITAMIN D3) 2000 UNITS capsule, Take  by mouth 2 (two) times a day., Disp: , Rfl:   •  Cinnamon 500 MG capsule, Take 2,000 mg by mouth Daily., Disp: , Rfl:   •  Coenzyme Q10 100 MG tablet, Take 2 tablets by mouth Daily., Disp: , Rfl:   •  fenofibric acid (TRILIPIX) 135 MG capsule delayed-release delayed release capsule, TAKE ONE CAPSULE BY MOUTH DAILY, Disp: 30 capsule, Rfl: 3  •  JARDIANCE 10 MG tablet, 10 mg Daily., Disp: , Rfl:   •  Lecithin 1200 MG capsule, Take  by mouth Daily., Disp: , Rfl:   •  metFORMIN (GLUCOPHAGE) 500 MG tablet, Take 2 tablets by mouth 2 (Two) Times a Day With Meals., Disp: 120 tablet, Rfl: 2  •  metoprolol succinate XL (TOPROL-XL) 100 MG 24 hr tablet, TAKE 1 TABLET BY MOUTH ONE TIME A DAY , Disp: 30 tablet, Rfl: 1  •   "Multiple Vitamins-Minerals (ICAPS AREDS 2 PO), Take  by mouth., Disp: , Rfl:   •  nystatin (MYCOSTATIN) 537666 UNIT/GM powder, Apply  topically to the appropriate area as directed Daily As Needed (rash)., Disp: 60 g, Rfl: 0  •  triamterene-hydrochlorothiazide (DYAZIDE) 37.5-25 MG per capsule, TAKE ONE CAPSULE BY MOUTH DAILY, Disp: 30 capsule, Rfl: 3  •  TRULICITY 0.75 MG/0.5ML solution pen-injector, , Disp: , Rfl:   •  pravastatin (PRAVACHOL) 40 MG tablet, TAKE ONE TABLET BY MOUTH EVERY NIGHT AT BEDTIME, Disp: 30 tablet, Rfl: 2    Vitals:    11/04/19 1254   BP: 134/78   BP Location: Right arm   Patient Position: Sitting   Cuff Size: Adult   Pulse: 76   SpO2: 94%   Weight: 94.3 kg (208 lb)   Height: 167.6 cm (66\")       Physical Exam:     Physical Exam   Constitutional: He is oriented to person, place, and time. He appears well-developed and well-nourished. No distress.   HENT:   Head: Normocephalic and atraumatic.   Eyes: Conjunctivae and EOM are normal. Pupils are equal, round, and reactive to light. No scleral icterus.   Neck: Trachea normal and normal range of motion. Neck supple. No JVD present. Carotid bruit is not present. No tracheal deviation present. No thyromegaly present.   Cardiovascular: Normal rate, regular rhythm, S1 normal, S2 normal, normal heart sounds and intact distal pulses. PMI is not displaced. Exam reveals no gallop and no friction rub.   No murmur heard.  Pulses:       Carotid pulses are 1+ on the right side, and 1+ on the left side.  Pulmonary/Chest: Effort normal and breath sounds normal. No respiratory distress. He has no wheezes. He has no rales. He exhibits no tenderness.   Abdominal: Soft. Bowel sounds are normal. He exhibits no distension and no mass. There is no tenderness.   Musculoskeletal: Normal range of motion. He exhibits no edema.   Neurological: He is alert and oriented to person, place, and time. No sensory deficit.   Skin: Skin is warm and dry. Capillary refill takes less " than 2 seconds. No rash noted. He is not diaphoretic.   Psychiatric: He has a normal mood and affect. His behavior is normal. Judgment and thought content normal.   Nursing note and vitals reviewed.      Procedures    Results:   Reviewed medication and allergy profile and updated.  Reviewed vital signs.    Assessment/Plan:   No changes in patient's medication regimen.  Advised patient on weight loss recommendation and to increase aerobic exercise as tolerated.  Will refer to cardiac rehab for patient guidance.  No cardiac testing is indicated at this time.  Blane was seen today for follow-up and hypertension.    Diagnoses and all orders for this visit:    Essential hypertension  -     Ambulatory Referral to Cardiac Rehab    Hypercholesteremia  -     Ambulatory Referral to Cardiac Rehab    Aortic valve stenosis, etiology of cardiac valve disease unspecified  -     Ambulatory Referral to Cardiac Rehab    Obstructive sleep apnea syndrome  -     Ambulatory Referral to Cardiac Rehab    S/P AVR  -     Ambulatory Referral to Cardiac Rehab        Return in about 6 months (around 5/4/2020).    Jesus Altamirano, HIPOLITO

## 2019-11-08 ENCOUNTER — OFFICE VISIT (OUTPATIENT)
Dept: INTERNAL MEDICINE | Facility: CLINIC | Age: 84
End: 2019-11-08

## 2019-11-08 VITALS
HEART RATE: 82 BPM | BODY MASS INDEX: 32.95 KG/M2 | SYSTOLIC BLOOD PRESSURE: 132 MMHG | DIASTOLIC BLOOD PRESSURE: 76 MMHG | HEIGHT: 66 IN | OXYGEN SATURATION: 95 % | TEMPERATURE: 98.2 F | WEIGHT: 205 LBS

## 2019-11-08 DIAGNOSIS — L08.9 SKIN INFECTION, BACTERIAL: Primary | ICD-10-CM

## 2019-11-08 DIAGNOSIS — B96.89 SKIN INFECTION, BACTERIAL: Primary | ICD-10-CM

## 2019-11-08 PROCEDURE — 99213 OFFICE O/P EST LOW 20 MIN: CPT | Performed by: NURSE PRACTITIONER

## 2019-11-08 RX ORDER — SULFAMETHOXAZOLE AND TRIMETHOPRIM 800; 160 MG/1; MG/1
1 TABLET ORAL 2 TIMES DAILY
Qty: 20 TABLET | Refills: 0 | Status: SHIPPED | OUTPATIENT
Start: 2019-11-08 | End: 2019-11-18

## 2019-11-08 NOTE — PROGRESS NOTES
Chief Complaint   Patient presents with   • cut on thumb       History of Present Illness    Blane Dietz is a 84 y.o. male who presents today for skin infection of left thumb. First noticed scrape on thumb 1 week ago with redness and swelling increasing over time. He has been keeping wound clean and applying antibitoic ointment and wrapping. Gradually noticed increased swelling, erythema, edema, painful to touch and throbbing x 1 week. Concerned for worsening infection due to active diabetes. Allergic to PCN unsure of cephalosporins reaction.     [facility]    The following portions of the patient's history were reviewed and updated as appropriate: allergies, current medications, past family history, past medical history, past social history, past surgical history and problem list.     Social History     Tobacco Use   • Smoking status: Never Smoker   • Smokeless tobacco: Never Used   Substance Use Topics   • Alcohol use: No       Past Medical History:   Diagnosis Date   • Aortic stenosis     status post ROSS procedure, remote, with December 2015 echocardiography revealing normal aortic and pulmonary valve function, normal ejection fraction and mild to moderate MR   • Arthritis    • Depression    • Diabetes mellitus (CMS/HCC)    • Diverticulitis    • Exogenous obesity    • Gout    • Heart murmur    • History of vitamin D deficiency    • Hyperlipidemia    • Hypertension    • Malignant neoplasm of prostate (CMS/HCC)    • Sleep apnea    • Vertigo        Past Surgical History:   Procedure Laterality Date   • CARDIAC VALVE REPLACEMENT     • CATARACT EXTRACTION Bilateral    • COLON SURGERY     • COLONOSCOPY     • COLOSTOMY  1999   • COLOSTOMY REVISION     • EXPLORATORY LAPAROTOMY      With Tissue Removal   • LIPOMA EXCISION     • OTHER SURGICAL HISTORY      Porcine Valve   • PROSTATE SURGERY     • PROSTATECTOMY  2000     History of Prostatectomy Perineal Radical   • SKIN CANCER EXCISION      from head and lip   •  TONSILLECTOMY         Allergies   Allergen Reactions   • Ampicillin Anaphylaxis   • Medrol [Methylprednisolone] Other (See Comments)     Made his blood sugar get really high, can't take this   • Penicillins Anaphylaxis   • Bee Venom Swelling   • Myrbetriq [Mirabegron] Other (See Comments)     High BP         Current Outpatient Medications:   •  albuterol sulfate  (90 Base) MCG/ACT inhaler, Inhale 2 puffs Every 6 (Six) Hours As Needed for Wheezing., Disp: 1 inhaler, Rfl: 0  •  allopurinol (ZYLOPRIM) 300 MG tablet, Take 1 tablet by mouth Daily., Disp: 90 tablet, Rfl: 1  •  aspirin 81 MG EC tablet, Take 81 mg by mouth Daily., Disp: , Rfl:   •  CRISTINA CONTOUR TEST test strip, , Disp: , Rfl:   •  Cholecalciferol (VITAMIN D3) 2000 UNITS capsule, Take  by mouth 2 (two) times a day., Disp: , Rfl:   •  Cinnamon 500 MG capsule, Take 2,000 mg by mouth Daily., Disp: , Rfl:   •  Coenzyme Q10 100 MG tablet, Take 2 tablets by mouth Daily., Disp: , Rfl:   •  fenofibric acid (TRILIPIX) 135 MG capsule delayed-release delayed release capsule, TAKE ONE CAPSULE BY MOUTH DAILY, Disp: 30 capsule, Rfl: 3  •  JARDIANCE 10 MG tablet, 10 mg Daily., Disp: , Rfl:   •  Lecithin 1200 MG capsule, Take  by mouth Daily., Disp: , Rfl:   •  metFORMIN (GLUCOPHAGE) 500 MG tablet, Take 2 tablets by mouth 2 (Two) Times a Day With Meals., Disp: 120 tablet, Rfl: 2  •  metoprolol succinate XL (TOPROL-XL) 100 MG 24 hr tablet, TAKE 1 TABLET BY MOUTH ONE TIME A DAY , Disp: 30 tablet, Rfl: 1  •  Multiple Vitamins-Minerals (ICAPS AREDS 2 PO), Take  by mouth., Disp: , Rfl:   •  nystatin (MYCOSTATIN) 097399 UNIT/GM powder, Apply  topically to the appropriate area as directed Daily As Needed (rash)., Disp: 60 g, Rfl: 0  •  pravastatin (PRAVACHOL) 40 MG tablet, TAKE ONE TABLET BY MOUTH EVERY NIGHT AT BEDTIME, Disp: 30 tablet, Rfl: 2  •  triamterene-hydrochlorothiazide (DYAZIDE) 37.5-25 MG per capsule, TAKE ONE CAPSULE BY MOUTH DAILY, Disp: 30 capsule, Rfl: 3  •  " TRULICITY 0.75 MG/0.5ML solution pen-injector, , Disp: , Rfl:   •  sulfamethoxazole-trimethoprim (BACTRIM DS) 800-160 MG per tablet, Take 1 tablet by mouth 2 (Two) Times a Day for 10 days., Disp: 20 tablet, Rfl: 0    Review of Systems  Review of Systems   Constitutional: Negative for appetite change, chills, diaphoresis, fatigue and fever.   Respiratory: Negative for cough and shortness of breath.    Cardiovascular: Negative for chest pain and palpitations.   Gastrointestinal: Negative for diarrhea, nausea and vomiting.   Skin: Positive for color change and wound.   Neurological: Negative for headaches.   Psychiatric/Behavioral: Negative for sleep disturbance.       Vitals:  Vitals:    11/08/19 1440   BP: 132/76   Pulse: 82   Temp: 98.2 °F (36.8 °C)   TempSrc: Oral   SpO2: 95%   Weight: 93 kg (205 lb)   Height: 167.6 cm (65.98\")       Physical Exam  Physical Exam   Constitutional: He is oriented to person, place, and time. Vital signs are normal. He appears well-developed and well-nourished.   HENT:   Head: Normocephalic and atraumatic.   Cardiovascular: Normal rate, regular rhythm and normal heart sounds.   Pulmonary/Chest: Effort normal and breath sounds normal. He has no decreased breath sounds. He has no wheezes.   Neurological: He is alert and oriented to person, place, and time.   Skin: Skin is warm and dry. No abrasion, no bruising, no burn, no ecchymosis, no laceration and no lesion noted. There is erythema.   Erythema, edema, and tenderness to palpation of left thumb. There is a small crack along skin at the top of the nail. There is no drainage noted.   Psychiatric: He has a normal mood and affect. His behavior is normal.   Nursing note and vitals reviewed.      Labs  None this visit    Assessment/Plan    Blane was seen today for cut on thumb.    Diagnoses and all orders for this visit:    Skin infection, bacterial  -     sulfamethoxazole-trimethoprim (BACTRIM DS) 800-160 MG per tablet; Take 1 tablet by " mouth 2 (Two) Times a Day for 10 days.    Antibiotic twice daily as prescribed. Recommend warm water soaks twice daily and cleansing with mild antibacterial soap or chlorhexidine antiseptic wash. Apply topical antibiotic ointment and keep covered with clean sterile gauze. Follow-up with PCP if worsening or no improvement.    Plan of care reviewed with patient at conclusion of today's visit. Patient education was provided regarding diagnosis, management, and prescribed or recommended OTC medications. Patient was informed to notify office of any new, worsening, or persistent symptoms. Patient verbalized understanding and agreement with plan of care.     Follow-Up  Return if symptoms worsen or fail to improve.    Electronically Signed By:  HIPOLITO Obrien

## 2019-11-08 NOTE — PATIENT INSTRUCTIONS
Antibiotic twice daily as prescribed.  Recommend warm water soaks twice daily and cleansing with mild antibacterial soap or chlorhexidine antiseptic wash.   Apply topical antibiotic ointment and keep covered with clean sterile gauze.

## 2019-11-12 RX ORDER — METOPROLOL SUCCINATE 100 MG/1
TABLET, EXTENDED RELEASE ORAL
Qty: 30 TABLET | Refills: 0 | Status: SHIPPED | OUTPATIENT
Start: 2019-11-12 | End: 2020-01-21

## 2019-11-13 ENCOUNTER — OFFICE VISIT (OUTPATIENT)
Dept: INTERNAL MEDICINE | Facility: CLINIC | Age: 84
End: 2019-11-13

## 2019-11-13 VITALS
BODY MASS INDEX: 32.14 KG/M2 | SYSTOLIC BLOOD PRESSURE: 128 MMHG | OXYGEN SATURATION: 96 % | DIASTOLIC BLOOD PRESSURE: 76 MMHG | HEART RATE: 68 BPM | HEIGHT: 66 IN | WEIGHT: 200 LBS

## 2019-11-13 DIAGNOSIS — C61 PROSTATE CANCER (HCC): ICD-10-CM

## 2019-11-13 DIAGNOSIS — E78.00 HYPERCHOLESTEREMIA: ICD-10-CM

## 2019-11-13 DIAGNOSIS — I10 ESSENTIAL HYPERTENSION: Primary | ICD-10-CM

## 2019-11-13 DIAGNOSIS — M15.8 OTHER OSTEOARTHRITIS INVOLVING MULTIPLE JOINTS: ICD-10-CM

## 2019-11-13 PROCEDURE — 99213 OFFICE O/P EST LOW 20 MIN: CPT | Performed by: INTERNAL MEDICINE

## 2019-11-13 PROCEDURE — G0008 ADMIN INFLUENZA VIRUS VAC: HCPCS | Performed by: INTERNAL MEDICINE

## 2019-11-13 PROCEDURE — 90653 IIV ADJUVANT VACCINE IM: CPT | Performed by: INTERNAL MEDICINE

## 2019-11-13 NOTE — PROGRESS NOTES
Subjective   Blane Dietz is a 84 y.o. male.   Chief Complaint   Patient presents with   • Hypertension     Follow Up   • Hyperlipidemia       Hypertension   This is a chronic problem. The current episode started more than 1 year ago. Pertinent negatives include no chest pain, headaches, neck pain, palpitations or shortness of breath.   Osteoarthritis   Pertinent negatives include no diarrhea, dysuria, fatigue, fever or rash. His past medical history is significant for osteoarthritis.   Hyperlipidemia   This is a chronic problem. The current episode started more than 1 year ago. Pertinent negatives include no chest pain, myalgias or shortness of breath.        Prostate cancer and is following with onc.  Seen at  for cough. Given claritin and in haler and better.  The following portions of the patient's history were reviewed and updated as appropriate: allergies, current medications, past family history, past medical history, past social history, past surgical history and problem list.    Review of Systems   Constitutional: Negative for activity change, appetite change, chills, diaphoresis, fatigue, fever and unexpected weight change.   HENT: Negative for congestion, ear discharge, ear pain, mouth sores, nosebleeds, sinus pressure, sneezing and sore throat.    Eyes: Negative for pain, discharge and itching.   Respiratory: Negative for cough, chest tightness, shortness of breath and wheezing.    Cardiovascular: Negative for chest pain, palpitations and leg swelling.   Gastrointestinal: Negative for abdominal pain, constipation, diarrhea, nausea and vomiting.   Endocrine: Negative for cold intolerance, heat intolerance, polydipsia and polyphagia.   Genitourinary: Negative for dysuria, flank pain, frequency, hematuria and urgency.   Musculoskeletal: Negative for arthralgias, back pain, gait problem, myalgias, neck pain and neck stiffness.   Skin: Negative for color change, pallor and rash.   Neurological: Negative  for seizures, speech difficulty, numbness and headaches.   Psychiatric/Behavioral: Negative for agitation, confusion, decreased concentration and sleep disturbance. The patient is not nervous/anxious.      There were no vitals taken for this visit.    Objective   Physical Exam   Constitutional: He appears well-developed.   HENT:   Head: Normocephalic.   Right Ear: External ear normal.   Left Ear: External ear normal.   Nose: Nose normal.   Mouth/Throat: Oropharynx is clear and moist.   Eyes: Conjunctivae are normal. Pupils are equal, round, and reactive to light.   Neck: No JVD present. No thyromegaly present.   Cardiovascular: Normal rate, regular rhythm and normal heart sounds. Exam reveals no friction rub.   No murmur heard.  Pulmonary/Chest: Effort normal and breath sounds normal. No respiratory distress. He has no wheezes. He has no rales.   Abdominal: Soft. Bowel sounds are normal. He exhibits no distension. There is no tenderness. There is no guarding.   Musculoskeletal: He exhibits no edema or tenderness.   Lymphadenopathy:     He has no cervical adenopathy.   Neurological: He displays normal reflexes. No cranial nerve deficit.   Skin: No rash noted.   Psychiatric: His behavior is normal.   Nursing note and vitals reviewed.      Assessment/Plan   Blane was seen today for hypertension and osteoarthritis.    Diagnoses and all orders for this visit:    Essential hypertension  stable  Hypercholesteremia    cmp and lipids  Osteoarthritis  stable  Prostate cancer  Followed by onc. PSA trending back down.   DM  Follows with Endo regarding his blood sugars.

## 2019-11-14 LAB
ALBUMIN SERPL-MCNC: 4.7 G/DL (ref 3.5–5.2)
ALBUMIN/GLOB SERPL: 1.9 G/DL
ALP SERPL-CCNC: 52 U/L (ref 39–117)
ALT SERPL-CCNC: 33 U/L (ref 1–41)
AST SERPL-CCNC: 35 U/L (ref 1–40)
BILIRUB SERPL-MCNC: 0.6 MG/DL (ref 0.2–1.2)
BUN SERPL-MCNC: 19 MG/DL (ref 8–23)
BUN/CREAT SERPL: 15.2 (ref 7–25)
CALCIUM SERPL-MCNC: 9.7 MG/DL (ref 8.6–10.5)
CHLORIDE SERPL-SCNC: 96 MMOL/L (ref 98–107)
CHOLEST SERPL-MCNC: 178 MG/DL (ref 0–200)
CO2 SERPL-SCNC: 26.1 MMOL/L (ref 22–29)
CREAT SERPL-MCNC: 1.25 MG/DL (ref 0.76–1.27)
GLOBULIN SER CALC-MCNC: 2.5 GM/DL
GLUCOSE SERPL-MCNC: 342 MG/DL (ref 65–99)
HDLC SERPL-MCNC: 46 MG/DL (ref 40–60)
LDLC SERPL CALC-MCNC: 76 MG/DL (ref 0–100)
POTASSIUM SERPL-SCNC: 4.2 MMOL/L (ref 3.5–5.2)
PROT SERPL-MCNC: 7.2 G/DL (ref 6–8.5)
SODIUM SERPL-SCNC: 138 MMOL/L (ref 136–145)
TRIGL SERPL-MCNC: 280 MG/DL (ref 0–150)
VLDLC SERPL CALC-MCNC: 56 MG/DL

## 2019-11-20 ENCOUNTER — HOSPITAL ENCOUNTER (EMERGENCY)
Facility: HOSPITAL | Age: 84
Discharge: HOME | End: 2019-11-20
Attending: EMERGENCY MEDICINE | Admitting: EMERGENCY MEDICINE

## 2019-11-20 ENCOUNTER — APPOINTMENT (OUTPATIENT)
Dept: GENERAL RADIOLOGY | Facility: HOSPITAL | Age: 84
End: 2019-11-20

## 2019-11-20 VITALS
DIASTOLIC BLOOD PRESSURE: 75 MMHG | HEIGHT: 66 IN | BODY MASS INDEX: 32.14 KG/M2 | HEART RATE: 91 BPM | WEIGHT: 200 LBS | SYSTOLIC BLOOD PRESSURE: 163 MMHG | OXYGEN SATURATION: 97 % | RESPIRATION RATE: 18 BRPM | TEMPERATURE: 97.7 F

## 2019-11-20 DIAGNOSIS — M43.6 TORTICOLLIS, ACUTE: Primary | ICD-10-CM

## 2019-11-20 PROCEDURE — 99283 EMERGENCY DEPT VISIT LOW MDM: CPT

## 2019-11-20 PROCEDURE — 72040 X-RAY EXAM NECK SPINE 2-3 VW: CPT

## 2019-11-20 RX ORDER — HYDROCODONE BITARTRATE AND ACETAMINOPHEN 5; 325 MG/1; MG/1
1 TABLET ORAL EVERY 6 HOURS PRN
Qty: 12 TABLET | Refills: 0 | Status: SHIPPED | OUTPATIENT
Start: 2019-11-20 | End: 2020-09-03

## 2019-11-20 RX ORDER — METAXALONE 800 MG/1
800 TABLET ORAL 3 TIMES DAILY PRN
Qty: 12 TABLET | Refills: 0 | OUTPATIENT
Start: 2019-11-20 | End: 2020-05-08

## 2019-11-20 NOTE — ED PROVIDER NOTES
Subjective   Mr. Dietz is an 84-year-old male that comes to the emergency today complaining of pain in the right side of his posterior neck.  Patient reports the pain is much worse with movement.  He had a similar episode like this several years ago after lifting a heavy bucket.  Patient reports he did not recall trauma.  He had no numbness or tingling upper extremities.  Denies any chest pain shortness of breath or diaphoresis associated with this.  She reports the pain is made worse with palpation of the neck or with any type of movement of the neck.  He has no other complaints.        History provided by:  Patient   used: No    Neck Pain   Pain location:  R side  Quality:  Stabbing and shooting  Pain radiates to:  Does not radiate  Pain severity:  Severe  Onset quality:  Gradual  Timing:  Constant  Progression:  Worsening  Chronicity:  Recurrent  Context: lifting a heavy object    Context: not fall, not jumping from heights, not MCA, not MVC, not pedestrian accident and not recent injury    Relieved by:  Neck support  Worsened by:  Twisting and bending  Ineffective treatments:  None tried  Associated symptoms: no bladder incontinence, no bowel incontinence, no chest pain, no fever, no leg pain, no photophobia, no syncope, no visual change, no weakness and no weight loss    Risk factors: no hx of head and neck radiation, no hx of osteoporosis, no hx of spinal trauma, no recent epidural, no recent head injury and no recurrent falls        Review of Systems   Constitutional: Negative for fever and weight loss.   Eyes: Negative for photophobia.   Respiratory: Negative for chest tightness, shortness of breath and wheezing.    Cardiovascular: Negative for chest pain, palpitations and syncope.   Gastrointestinal: Negative for bowel incontinence, diarrhea, nausea and vomiting.   Genitourinary: Negative for bladder incontinence.   Musculoskeletal: Positive for neck pain.   Neurological: Negative for  weakness.   Psychiatric/Behavioral: Negative.    All other systems reviewed and are negative.      Past Medical History:   Diagnosis Date   • Aortic stenosis     status post ROSS procedure, remote, with December 2015 echocardiography revealing normal aortic and pulmonary valve function, normal ejection fraction and mild to moderate MR   • Arthritis    • Depression    • Diabetes mellitus (CMS/HCC)    • Diverticulitis    • Exogenous obesity    • Gout    • Heart murmur    • History of vitamin D deficiency    • Hyperlipidemia    • Hypertension    • Malignant neoplasm of prostate (CMS/HCC)    • Sleep apnea    • Vertigo        Allergies   Allergen Reactions   • Ampicillin Anaphylaxis   • Medrol [Methylprednisolone] Other (See Comments)     Made his blood sugar get really high, can't take this   • Penicillins Anaphylaxis   • Bee Venom Swelling   • Myrbetriq [Mirabegron] Other (See Comments)     High BP       Past Surgical History:   Procedure Laterality Date   • CARDIAC VALVE REPLACEMENT     • CATARACT EXTRACTION Bilateral    • COLON SURGERY     • COLONOSCOPY     • COLOSTOMY  1999   • COLOSTOMY REVISION     • EXPLORATORY LAPAROTOMY      With Tissue Removal   • LIPOMA EXCISION     • OTHER SURGICAL HISTORY      Porcine Valve   • PROSTATE SURGERY     • PROSTATECTOMY  2000     History of Prostatectomy Perineal Radical   • SKIN CANCER EXCISION      from head and lip   • TONSILLECTOMY         Family History   Problem Relation Age of Onset   • Diabetes Mother    • Lung cancer Mother    • Cancer Mother    • Heart disease Father    • Breast cancer Other    • Cancer Other    • Hypertension Other    • Migraines Other    • Obesity Other    • Diabetes Other        Social History     Socioeconomic History   • Marital status: Single     Spouse name: Not on file   • Number of children: Not on file   • Years of education: Not on file   • Highest education level: Not on file   Tobacco Use   • Smoking status: Never Smoker   • Smokeless  tobacco: Never Used   Substance and Sexual Activity   • Alcohol use: No   • Drug use: No   • Sexual activity: Defer           Objective   Physical Exam   Constitutional: He is oriented to person, place, and time. He appears well-developed and well-nourished.   HENT:   Head: Normocephalic and atraumatic.   Right Ear: External ear normal.   Left Ear: External ear normal.   Nose: Nose normal.   Mouth/Throat: Oropharynx is clear and moist.   Eyes: Conjunctivae and EOM are normal. Pupils are equal, round, and reactive to light. No scleral icterus.   Neck: Normal range of motion. No thyromegaly present.   Pulmonary/Chest: No respiratory distress.   Musculoskeletal: Normal range of motion.   Tenderness in the right paraspinous muscles multiple trigger points no rash no lesions.   Lymphadenopathy:     He has no cervical adenopathy.   Neurological: He is alert and oriented to person, place, and time. He has normal reflexes. He displays normal reflexes. No cranial nerve deficit. Coordination normal.   Skin: Skin is warm and dry.   Psychiatric: He has a normal mood and affect. His behavior is normal. Judgment and thought content normal.   Nursing note and vitals reviewed.      Procedures           ED Course  ED Course as of Nov 20 1751 Wed Nov 20, 2019   1307 Request # : [id number]  CASIE query complete. Treatment plan to include limited course of prescribed  controlled substance. Risks including addiction, benefits, and alternatives presented to patient.    [DORI]      ED Course User Index  [DORI] Shaw Tracy PA      No results found for this or any previous visit (from the past 24 hour(s)).  Note: In addition to lab results from this visit, the labs listed above may include labs taken at another facility or during a different encounter within the last 24 hours. Please correlate lab times with ED admission and discharge times for further clarification of the services performed during this visit.    XR Spine Cervical 2  "or 3 View   Final Result   Degenerative changes of the cervical spine including   anterolistheses and degenerative disc disease as well as disc osteophyte   complex formation without acute cortical irregularity or fracture   clearly evident.       D:  11/20/2019   E:  11/20/2019       This report was finalized on 11/20/2019 5:28 PM by Dr. Bertram Alvarado.            Vitals:    11/20/19 1015   BP: 163/75   Pulse: 91   Resp: 18   Temp: 97.7 °F (36.5 °C)   TempSrc: Oral   SpO2: 97%   Weight: 90.7 kg (200 lb)   Height: 167.6 cm (66\")     Medications - No data to display  ECG/EMG Results (last 24 hours)     ** No results found for the last 24 hours. **        No orders to display                   MDM  Number of Diagnoses or Management Options  Torticollis, acute: new and requires workup     Amount and/or Complexity of Data Reviewed  Tests in the radiology section of CPT®: reviewed and ordered  Discuss the patient with other providers: yes    Patient Progress  Patient progress: stable      Final diagnoses:   Torticollis, acute              Shaw Tracy PA  11/20/19 1752    "

## 2019-11-25 RX ORDER — FENOFIBRIC ACID 135 MG/1
CAPSULE, DELAYED RELEASE ORAL
Qty: 30 CAPSULE | Refills: 2 | Status: ON HOLD | OUTPATIENT
Start: 2019-11-25 | End: 2022-06-04

## 2019-12-18 ENCOUNTER — INFUSION (OUTPATIENT)
Dept: ONCOLOGY | Facility: HOSPITAL | Age: 84
End: 2019-12-18

## 2019-12-18 ENCOUNTER — OFFICE VISIT (OUTPATIENT)
Dept: ONCOLOGY | Facility: CLINIC | Age: 84
End: 2019-12-18

## 2019-12-18 VITALS
RESPIRATION RATE: 16 BRPM | TEMPERATURE: 97.2 F | OXYGEN SATURATION: 93 % | HEIGHT: 66 IN | WEIGHT: 204 LBS | BODY MASS INDEX: 32.78 KG/M2 | SYSTOLIC BLOOD PRESSURE: 137 MMHG | HEART RATE: 77 BPM | DIASTOLIC BLOOD PRESSURE: 78 MMHG

## 2019-12-18 DIAGNOSIS — C61 PROSTATE CANCER (HCC): Primary | ICD-10-CM

## 2019-12-18 LAB
ALBUMIN SERPL-MCNC: 4.6 G/DL (ref 3.5–5.2)
ALBUMIN/GLOB SERPL: 1.7 G/DL
ALP SERPL-CCNC: 50 U/L (ref 39–117)
ALT SERPL W P-5'-P-CCNC: 29 U/L (ref 1–41)
ANION GAP SERPL CALCULATED.3IONS-SCNC: 13.9 MMOL/L (ref 5–15)
AST SERPL-CCNC: 33 U/L (ref 1–40)
BASOPHILS # BLD AUTO: 0.06 10*3/MM3 (ref 0–0.2)
BASOPHILS NFR BLD AUTO: 0.9 % (ref 0–1.5)
BILIRUB SERPL-MCNC: 0.7 MG/DL (ref 0.2–1.2)
BUN BLD-MCNC: 18 MG/DL (ref 8–23)
BUN/CREAT SERPL: 16.1 (ref 7–25)
CALCIUM SPEC-SCNC: 9.7 MG/DL (ref 8.6–10.5)
CHLORIDE SERPL-SCNC: 101 MMOL/L (ref 98–107)
CO2 SERPL-SCNC: 27.1 MMOL/L (ref 22–29)
CREAT BLD-MCNC: 1.12 MG/DL (ref 0.76–1.27)
DEPRECATED RDW RBC AUTO: 45.1 FL (ref 37–54)
EOSINOPHIL # BLD AUTO: 0.26 10*3/MM3 (ref 0–0.4)
EOSINOPHIL NFR BLD AUTO: 4 % (ref 0.3–6.2)
ERYTHROCYTE [DISTWIDTH] IN BLOOD BY AUTOMATED COUNT: 13.5 % (ref 12.3–15.4)
GFR SERPL CREATININE-BSD FRML MDRD: 63 ML/MIN/1.73
GLOBULIN UR ELPH-MCNC: 2.7 GM/DL
GLUCOSE BLD-MCNC: 258 MG/DL (ref 65–99)
HCT VFR BLD AUTO: 43 % (ref 37.5–51)
HGB BLD-MCNC: 14.2 G/DL (ref 13–17.7)
IMM GRANULOCYTES # BLD AUTO: 0.03 10*3/MM3 (ref 0–0.05)
IMM GRANULOCYTES NFR BLD AUTO: 0.5 % (ref 0–0.5)
LYMPHOCYTES # BLD AUTO: 1.25 10*3/MM3 (ref 0.7–3.1)
LYMPHOCYTES NFR BLD AUTO: 19.2 % (ref 19.6–45.3)
MAGNESIUM SERPL-MCNC: 1.9 MG/DL (ref 1.6–2.4)
MCH RBC QN AUTO: 30 PG (ref 26.6–33)
MCHC RBC AUTO-ENTMCNC: 33 G/DL (ref 31.5–35.7)
MCV RBC AUTO: 90.7 FL (ref 79–97)
MONOCYTES # BLD AUTO: 0.46 10*3/MM3 (ref 0.1–0.9)
MONOCYTES NFR BLD AUTO: 7.1 % (ref 5–12)
NEUTROPHILS # BLD AUTO: 4.45 10*3/MM3 (ref 1.7–7)
NEUTROPHILS NFR BLD AUTO: 68.3 % (ref 42.7–76)
NRBC BLD AUTO-RTO: 0 /100 WBC (ref 0–0.2)
PHOSPHATE SERPL-MCNC: 3.9 MG/DL (ref 2.5–4.5)
PLATELET # BLD AUTO: 155 10*3/MM3 (ref 140–450)
PMV BLD AUTO: 10.2 FL (ref 6–12)
POTASSIUM BLD-SCNC: 3.6 MMOL/L (ref 3.5–5.2)
PROT SERPL-MCNC: 7.3 G/DL (ref 6–8.5)
PSA SERPL-MCNC: 0.31 NG/ML (ref 0–4)
RBC # BLD AUTO: 4.74 10*6/MM3 (ref 4.14–5.8)
SODIUM BLD-SCNC: 142 MMOL/L (ref 136–145)
WBC NRBC COR # BLD: 6.51 10*3/MM3 (ref 3.4–10.8)

## 2019-12-18 PROCEDURE — 99214 OFFICE O/P EST MOD 30 MIN: CPT | Performed by: INTERNAL MEDICINE

## 2019-12-18 PROCEDURE — 25010000002 LEUPROLIDE 22.5 MG KIT: Performed by: INTERNAL MEDICINE

## 2019-12-18 PROCEDURE — 83735 ASSAY OF MAGNESIUM: CPT

## 2019-12-18 PROCEDURE — 80053 COMPREHEN METABOLIC PANEL: CPT

## 2019-12-18 PROCEDURE — 96372 THER/PROPH/DIAG INJ SC/IM: CPT

## 2019-12-18 PROCEDURE — 84153 ASSAY OF PSA TOTAL: CPT

## 2019-12-18 PROCEDURE — 96402 CHEMO HORMON ANTINEOPL SQ/IM: CPT

## 2019-12-18 PROCEDURE — 25010000002 DENOSUMAB 60 MG/ML SOLUTION PREFILLED SYRINGE: Performed by: INTERNAL MEDICINE

## 2019-12-18 PROCEDURE — 84100 ASSAY OF PHOSPHORUS: CPT

## 2019-12-18 PROCEDURE — 85025 COMPLETE CBC W/AUTO DIFF WBC: CPT

## 2019-12-18 PROCEDURE — 36415 COLL VENOUS BLD VENIPUNCTURE: CPT

## 2019-12-18 RX ORDER — CLINDAMYCIN HYDROCHLORIDE 150 MG/1
CAPSULE ORAL
COMMUNITY
Start: 2019-12-11 | End: 2020-03-18

## 2019-12-18 RX ADMIN — DENOSUMAB 60 MG: 60 INJECTION SUBCUTANEOUS at 15:23

## 2019-12-18 RX ADMIN — LEUPROLIDE ACETATE 22.5 MG: KIT SUBCUTANEOUS at 15:21

## 2019-12-18 NOTE — PROGRESS NOTES
DATE OF VISIT: 12/18/2019     REASON FOR VISIT:   1. Prostate cancer. Presented with stage I, [id number], PSA at diagnosis 3 status  post prostatectomy in 2000.  2. Relapsed disease with rise in PSA gradually over the last couple of years,  most recent PSA 7.1 ng/mL on 01/25/2016.      HISTORY OF PRESENT ILLNESS: The patient is a very pleasant 80-year-old  gentleman with past medical history significant for prostate cancer status post  radical prostatectomy in 2000. The patient never received adjuvant treatments,  neither radiation, GnRH or antiandrogen treatment. The patient's PSA started  to go up gradually over the last 2 years. Patient was referred to me on  06/13/2014 and I did restaging workup that came back negative including bone  scan, CAT scans chest, abdomen and pelvis. We elected to proceed with watchful  waiting. Patient is here today in scheduled followup visit.     SUBJECTIVE: Mr. Dietz is here today is here today by himself.  Since last time I saw him he had to the emergency room for neck pain that improved with as needed muscle relaxer.  Currently he is off medicines.  Denies any back pain.  He has lost 10 pounds which was induced by new diabetes medicine.    REVIEW OF SYSTEMS: All the other 9 systems are reviewed by me and negative  except what is mentioned in HPI and subjective.      PAST MEDICAL HISTORY/SOCIAL HISTORY/FAMILY HISTORY: Unchanged from my prior  documentation on 06/13/2014.       Current Outpatient Medications:   •  albuterol sulfate  (90 Base) MCG/ACT inhaler, Inhale 2 puffs Every 6 (Six) Hours As Needed for Wheezing., Disp: 1 inhaler, Rfl: 0  •  allopurinol (ZYLOPRIM) 300 MG tablet, Take 1 tablet by mouth Daily., Disp: 90 tablet, Rfl: 1  •  aspirin 81 MG EC tablet, Take 81 mg by mouth Daily., Disp: , Rfl:   •  CRISTINA CONTOUR TEST test strip, , Disp: , Rfl:   •  Cholecalciferol (VITAMIN D3) 2000 UNITS capsule, Take  by mouth 2 (two) times a day., Disp: , Rfl:   •  Cinnamon 500 MG  "capsule, Take 2,000 mg by mouth Daily., Disp: , Rfl:   •  clindamycin (CLEOCIN) 150 MG capsule, , Disp: , Rfl:   •  Coenzyme Q10 100 MG tablet, Take 2 tablets by mouth Daily., Disp: , Rfl:   •  fenofibric acid (TRILIPIX) 135 MG capsule delayed-release delayed release capsule, TAKE 1 CAPSULE BY MOUTH ONE TIME A DAY , Disp: 30 capsule, Rfl: 2  •  HYDROcodone-acetaminophen (NORCO) 5-325 MG per tablet, Take 1 tablet by mouth Every 6 (Six) Hours As Needed for Severe Pain ., Disp: 12 tablet, Rfl: 0  •  JARDIANCE 10 MG tablet, 10 mg Daily., Disp: , Rfl:   •  Lecithin 1200 MG capsule, Take  by mouth Daily., Disp: , Rfl:   •  metaxalone (SKELAXIN) 800 MG tablet, Take 1 tablet by mouth 3 (Three) Times a Day As Needed for Muscle Spasms., Disp: 12 tablet, Rfl: 0  •  metFORMIN (GLUCOPHAGE) 500 MG tablet, Take 2 tablets by mouth 2 (Two) Times a Day With Meals., Disp: 120 tablet, Rfl: 2  •  metoprolol succinate XL (TOPROL-XL) 100 MG 24 hr tablet, TAKE 1 TABLET BY MOUTH ONE TIME A DAY , Disp: 30 tablet, Rfl: 0  •  Multiple Vitamins-Minerals (PRESERVISION AREDS 2+MULTI VIT PO), Take  by mouth., Disp: , Rfl:   •  nystatin (MYCOSTATIN) 540796 UNIT/GM powder, Apply  topically to the appropriate area as directed Daily As Needed (rash)., Disp: 60 g, Rfl: 0  •  pravastatin (PRAVACHOL) 40 MG tablet, TAKE ONE TABLET BY MOUTH EVERY NIGHT AT BEDTIME, Disp: 30 tablet, Rfl: 2  •  triamterene-hydrochlorothiazide (DYAZIDE) 37.5-25 MG per capsule, TAKE ONE CAPSULE BY MOUTH DAILY, Disp: 30 capsule, Rfl: 3  •  TRULICITY 0.75 MG/0.5ML solution pen-injector, , Disp: , Rfl:     PHYSICAL EXAMINATION:   /78   Pulse 77   Temp 97.2 °F (36.2 °C) (Temporal)   Resp 16   Ht 167.6 cm (66\")   Wt 92.5 kg (204 lb)   SpO2 93%   BMI 32.93 kg/m²   ECOG1  GENERAL: Age appropriate. No acute distress.   HEENT: Head atraumatic, normocephalic.   NECK: Supple. No JVD. No lymphadenopathy.   LUNGS: Clear to auscultation bilaterally. No wheezing. No rhonchi. "   HEART: Regular rate and rhythm. S1, S2, no murmurs.   ABDOMEN: Soft, nontender, nondistended. Bowel sounds positive. No  hepatosplenomegaly.   EXTREMITIES: No clubbing, cyanosis, or edema.   SKIN: No rashes. No purpura.   NEUROLOGIC: Awake and oriented x3. Strength 5 out of 5 in all muscle groups.     No visits with results within 2 Week(s) from this visit.   Latest known visit with results is:   Office Visit on 11/13/2019   Component Date Value Ref Range Status   • Glucose 11/13/2019 342* 65 - 99 mg/dL Final   • BUN 11/13/2019 19  8 - 23 mg/dL Final   • Creatinine 11/13/2019 1.25  0.76 - 1.27 mg/dL Final   • eGFR Non African Am 11/13/2019 55* >60 mL/min/1.73 Final    Comment: The MDRD GFR formula is only valid for adults with stable  renal function between ages 18 and 70.     • eGFR  Am 11/13/2019 67  >60 mL/min/1.73 Final   • BUN/Creatinine Ratio 11/13/2019 15.2  7.0 - 25.0 Final   • Sodium 11/13/2019 138  136 - 145 mmol/L Final   • Potassium 11/13/2019 4.2  3.5 - 5.2 mmol/L Final   • Chloride 11/13/2019 96* 98 - 107 mmol/L Final   • Total CO2 11/13/2019 26.1  22.0 - 29.0 mmol/L Final   • Calcium 11/13/2019 9.7  8.6 - 10.5 mg/dL Final   • Total Protein 11/13/2019 7.2  6.0 - 8.5 g/dL Final   • Albumin 11/13/2019 4.70  3.50 - 5.20 g/dL Final   • Globulin 11/13/2019 2.5  gm/dL Final   • A/G Ratio 11/13/2019 1.9  g/dL Final   • Total Bilirubin 11/13/2019 0.6  0.2 - 1.2 mg/dL Final   • Alkaline Phosphatase 11/13/2019 52  39 - 117 U/L Final   • AST (SGOT) 11/13/2019 35  1 - 40 U/L Final   • ALT (SGPT) 11/13/2019 33  1 - 41 U/L Final   • Total Cholesterol 11/13/2019 178  0 - 200 mg/dL Final   • Triglycerides 11/13/2019 280* 0 - 150 mg/dL Final   • HDL Cholesterol 11/13/2019 46  40 - 60 mg/dL Final   • VLDL Cholesterol 11/13/2019 56  mg/dL Final   • LDL Cholesterol  11/13/2019 76  0 - 100 mg/dL Final        ASSESSMENT: The patient is a very pleasant 80-year-old gentleman with relapsed  prostate  cancer.     PROBLEM LIST:  1. Prostate cancer, initially presented as [id number] status post radical  prostatectomy 2000.  2. Rise in PSA gradually over the last 2 years,   3. Multiple comorbidities including type 2 diabetes, hypertension, morbid  obesity, osteoarthritis, and hypercholesterolemia.  4. Erectile dysfunction.   5.  Family history of colon cancer  6.  Osteopenia     PLAN:  1. I did go over the blood work results in detail with the patient. I explained to him that  his last PSA on September 18, 2019 is stable at 0.163.   I will continue Eligard 22.5mg q3 month.   2. The patient will come back to see me in 3 months with repeat blood work as well as serum PSA.   3. I will continue Viagra as needed for erectile dysfunction.   4.  He will continue with Gipsy and Trulicity subcu for type 2 diabetes.  5.  We'll continue pravastatin for hypercholesterolemia  6.  Patient need to have 5 year follow-up colonoscopy which would be due 11/2021.  7. We reviewed the potential side effects of the treatment including hot flashes, impotence, joint pain, decrease in bone density, mood or sleep disturbance, and asthenia.  8.  I'll continue the patient on Prolia 60 mg subcutaneous every 6 month.  His next dose will be due September 2019.  We'll continue calcium with vitamin D daily.   Shannan Ramon MD  12/18/2019

## 2020-01-21 RX ORDER — METOPROLOL SUCCINATE 100 MG/1
TABLET, EXTENDED RELEASE ORAL
Qty: 30 TABLET | Refills: 0 | Status: SHIPPED | OUTPATIENT
Start: 2020-01-21 | End: 2020-03-22

## 2020-02-05 RX ORDER — PRAVASTATIN SODIUM 40 MG
TABLET ORAL
Qty: 90 TABLET | Refills: 0 | Status: SHIPPED | OUTPATIENT
Start: 2020-02-05 | End: 2022-08-01 | Stop reason: SDUPTHER

## 2020-02-10 ENCOUNTER — HOSPITAL ENCOUNTER (EMERGENCY)
Facility: HOSPITAL | Age: 85
Discharge: HOME | End: 2020-02-10
Attending: EMERGENCY MEDICINE | Admitting: EMERGENCY MEDICINE

## 2020-02-10 ENCOUNTER — APPOINTMENT (OUTPATIENT)
Dept: CT IMAGING | Facility: HOSPITAL | Age: 85
End: 2020-02-10

## 2020-02-10 ENCOUNTER — EPISODE CHANGES (OUTPATIENT)
Dept: CASE MANAGEMENT | Facility: OTHER | Age: 85
End: 2020-02-10

## 2020-02-10 VITALS
HEART RATE: 89 BPM | OXYGEN SATURATION: 95 % | BODY MASS INDEX: 30.53 KG/M2 | TEMPERATURE: 98.5 F | HEIGHT: 66 IN | SYSTOLIC BLOOD PRESSURE: 110 MMHG | WEIGHT: 190 LBS | RESPIRATION RATE: 18 BRPM | DIASTOLIC BLOOD PRESSURE: 56 MMHG

## 2020-02-10 DIAGNOSIS — S39.012A STRAIN OF MUSCLE, FASCIA AND TENDON OF LOWER BACK, INITIAL ENCOUNTER: Primary | ICD-10-CM

## 2020-02-10 LAB
BILIRUB UR QL STRIP: NEGATIVE
CLARITY UR: CLEAR
COLOR UR: YELLOW
GLUCOSE UR STRIP-MCNC: ABNORMAL MG/DL
HGB UR QL STRIP.AUTO: NEGATIVE
HOLD SPECIMEN: NORMAL
HOLD SPECIMEN: NORMAL
KETONES UR QL STRIP: NEGATIVE
LEUKOCYTE ESTERASE UR QL STRIP.AUTO: NEGATIVE
NITRITE UR QL STRIP: NEGATIVE
PH UR STRIP.AUTO: 6.5 [PH] (ref 5–8)
PROT UR QL STRIP: NEGATIVE
SP GR UR STRIP: 1.03 (ref 1–1.03)
UROBILINOGEN UR QL STRIP: ABNORMAL
WHOLE BLOOD HOLD SPECIMEN: NORMAL
WHOLE BLOOD HOLD SPECIMEN: NORMAL

## 2020-02-10 PROCEDURE — 99284 EMERGENCY DEPT VISIT MOD MDM: CPT

## 2020-02-10 PROCEDURE — 74176 CT ABD & PELVIS W/O CONTRAST: CPT

## 2020-02-10 PROCEDURE — 72131 CT LUMBAR SPINE W/O DYE: CPT

## 2020-02-10 PROCEDURE — 81003 URINALYSIS AUTO W/O SCOPE: CPT | Performed by: EMERGENCY MEDICINE

## 2020-02-10 RX ORDER — SODIUM CHLORIDE 0.9 % (FLUSH) 0.9 %
10 SYRINGE (ML) INJECTION AS NEEDED
Status: DISCONTINUED | OUTPATIENT
Start: 2020-02-10 | End: 2020-02-10 | Stop reason: HOSPADM

## 2020-02-10 RX ORDER — METAXALONE 800 MG/1
800 TABLET ORAL 3 TIMES DAILY PRN
Qty: 10 TABLET | Refills: 0 | Status: SHIPPED | OUTPATIENT
Start: 2020-02-10 | End: 2020-03-18

## 2020-02-10 NOTE — ED PROVIDER NOTES
Subjective   Mr. Blane Dietz is a 84 y.o. male who presents to the ED with c/o back pain since 2 weeks ago. He reports sharp pain of the right lower back - over the kidney region. The pain does not radiate to the abdomen or the groin. He notes that the pain is intermittent and persist for approximately 10 minutes until he can maneuver into a new position. The pain worsens during position changes, but is completely relieved after he the position change. The patient added that he is able to sleep horizontally and that walking does not exacerbate the pain. He denies chest pain, fever, urination problems including hematuria and dysuria. He denies a history of renal calculi, but notes suspicion of a possible kidney stone. He notes a few recent falls, most recent approximately 2 days ago, tripped over his dog but denies landing on his back. He denies any back surgeries but notes 6 chest surgeries including: cardiac valve replacement, colostomy (removal of perforated colon), exploratory laparotomy, colostomy revision. The patient notes that he likes being independent and lifting objects himself, without asking for help. There are no other acute complaints at this time.      History provided by:  Patient   used: No    Back Pain   Pain location: right lower back   Quality: sharp.  Radiates to:  Does not radiate  Pain severity:  Moderate  Onset quality:  Sudden  Duration:  2 weeks  Timing:  Intermittent  Progression:  Unchanged  Chronicity:  New  Relieved by:  Lying down (position changes)  Exacerbated by: sitting up.  Associated symptoms: no abdominal pain, no chest pain, no dysuria, no fever and no leg pain    Risk factors: obesity        Review of Systems   Constitutional: Negative for fever.   Cardiovascular: Negative for chest pain.   Gastrointestinal: Negative for abdominal pain.   Genitourinary: Negative for difficulty urinating, dysuria, frequency, hematuria and urgency.   Musculoskeletal:  Positive for back pain (right lower back).        Negative for groin pain.   All other systems reviewed and are negative.      Past Medical History:   Diagnosis Date   • Aortic stenosis     status post ROSS procedure, remote, with December 2015 echocardiography revealing normal aortic and pulmonary valve function, normal ejection fraction and mild to moderate MR   • Arthritis    • Depression    • Diabetes mellitus (CMS/HCC)    • Diverticulitis    • Exogenous obesity    • Gout    • Heart murmur    • History of vitamin D deficiency    • Hyperlipidemia    • Hypertension    • Malignant neoplasm of prostate (CMS/HCC)    • Sleep apnea    • Vertigo        Allergies   Allergen Reactions   • Ampicillin Anaphylaxis   • Medrol [Methylprednisolone] Unknown - High Severity     Made his blood sugar get really high, can't take this   • Myrbetriq [Mirabegron] Unknown - High Severity     High BP   • Penicillins Anaphylaxis   • Bee Venom Swelling       Past Surgical History:   Procedure Laterality Date   • CARDIAC VALVE REPLACEMENT     • CATARACT EXTRACTION Bilateral    • COLON SURGERY     • COLONOSCOPY     • COLOSTOMY  1999   • COLOSTOMY REVISION     • EXPLORATORY LAPAROTOMY      With Tissue Removal   • LIPOMA EXCISION     • OTHER SURGICAL HISTORY      Porcine Valve   • PROSTATE SURGERY     • PROSTATECTOMY  2000     History of Prostatectomy Perineal Radical   • SKIN CANCER EXCISION      from head and lip   • TONSILLECTOMY         Family History   Problem Relation Age of Onset   • Diabetes Mother    • Lung cancer Mother    • Cancer Mother    • Heart disease Father    • Breast cancer Other    • Cancer Other    • Hypertension Other    • Migraines Other    • Obesity Other    • Diabetes Other        Social History     Socioeconomic History   • Marital status: Single     Spouse name: Not on file   • Number of children: Not on file   • Years of education: Not on file   • Highest education level: Not on file   Tobacco Use   • Smoking  status: Never Smoker   • Smokeless tobacco: Never Used   Substance and Sexual Activity   • Alcohol use: No   • Drug use: No   • Sexual activity: Defer         Objective   Physical Exam   Constitutional: He is oriented to person, place, and time. He appears well-developed and well-nourished.   HENT:   Head: Normocephalic and atraumatic.   Nose: Nose normal.   Eyes: Conjunctivae are normal. No scleral icterus.   Neck: Normal range of motion. Neck supple.   Cardiovascular: Normal rate, regular rhythm and normal heart sounds.   No murmur heard.  Pulmonary/Chest: Effort normal and breath sounds normal. No respiratory distress.   Abdominal: Soft. There is no tenderness.   There is no tenderness to percussion over the flank bilaterally.   Musculoskeletal: Normal range of motion. He exhibits tenderness (to palpation of the right lower back.).   There is reproduction of tenderness to palpation of the right lower back.  There is no redness, swelling, or rash.  There is no midline tenderness throughout the back of the cervical, thoracic, or lumbar spine.  The patient does move slowly and reports pain with attempted standing, but is able to stand and ambulate normally.     Neurological: He is alert and oriented to person, place, and time.   Skin: Skin is warm and dry.   Psychiatric: He has a normal mood and affect. His behavior is normal.   Nursing note and vitals reviewed.      Procedures         ED Course     Recent Results (from the past 24 hour(s))   Light Blue Top    Collection Time: 02/10/20  6:44 AM   Result Value Ref Range    Extra Tube hold for add-on    Green Top (Gel)    Collection Time: 02/10/20  6:44 AM   Result Value Ref Range    Extra Tube Hold for add-ons.    Lavender Top    Collection Time: 02/10/20  6:44 AM   Result Value Ref Range    Extra Tube hold for add-on    Gold Top - SST    Collection Time: 02/10/20  6:44 AM   Result Value Ref Range    Extra Tube Hold for add-ons.    Urinalysis With Culture If  Indicated - Urine, Clean Catch    Collection Time: 02/10/20  7:56 AM   Result Value Ref Range    Color, UA Yellow Yellow, Straw    Appearance, UA Clear Clear    pH, UA 6.5 5.0 - 8.0    Specific Gravity, UA 1.026 1.001 - 1.030    Glucose, UA >=1000 mg/dL (3+) (A) Negative    Ketones, UA Negative Negative    Bilirubin, UA Negative Negative    Blood, UA Negative Negative    Protein, UA Negative Negative    Leuk Esterase, UA Negative Negative    Nitrite, UA Negative Negative    Urobilinogen, UA 0.2 E.U./dL 0.2 - 1.0 E.U./dL     Note: In addition to lab results from this visit, the labs listed above may include labs taken at another facility or during a different encounter within the last 24 hours. Please correlate lab times with ED admission and discharge times for further clarification of the services performed during this visit.    CT Abdomen Pelvis Without Contrast   Preliminary Result   1. No evidence of obstructive uropathy identified. There is chronic   appearing perinephric stranding which appears slightly more pronounced   when compared to 09/15/2017. Correlate with urinalysis to exclude   inflammation of the ureters and kidneys in the setting of an ascending   bladder infection.       2. Advanced degenerative changes in the lumbar spine are identified with   bilateral L5 pars and interarticularis defects and grade 2   anterolisthesis of L5 on S1.  No evidence of acute fracture and/or   dislocation appreciated.       3. Postsurgical prostatectomy changes.       4. 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       - 8 mm hyperdense right superior pole renal lesion which is too small to   accurately characterize by CT criteria, although does not meet criteria   for simple cyst and may represent hyperdense cyst, however,  underlying   renal lesion is not excluded and consider followup surveillance imaging   as clinically appropriate.       D:  02/10/2020   E:  02/10/2020          CT Lumbar Spine Without Contrast   Preliminary Result   1. No evidence of obstructive uropathy identified. There is chronic   appearing perinephric stranding which appears slightly more pronounced   when compared to 09/15/2017. Correlate with urinalysis to exclude   inflammation of the ureters and kidneys in the setting of an ascending   bladder infection.       2. Advanced degenerative changes in the lumbar spine are identified with   bilateral L5 pars and interarticularis defects and grade 2   anterolisthesis of L5 on S1.  No evidence of acute fracture and/or   dislocation appreciated.       3. Postsurgical prostatectomy changes.       4. 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       - 8 mm hyperdense right superior pole renal lesion which is too small to   accurately characterize by CT criteria, although does not meet criteria   for simple cyst and may represent hyperdense cyst, however, underlying   renal lesion is not excluded and consider followup surveillance imaging   as clinically appropriate.       D:  02/10/2020   E:  02/10/2020            Vitals:    02/10/20 0828 02/10/20 0829 02/10/20 0830 02/10/20 0845   BP:   161/89 109/51   BP Location:       Patient Position:       Pulse: 88 96 89 91   Resp:       Temp:       TempSrc:       SpO2: 96% 94% 92% 94%   Weight:       Height:         Medications   sodium chloride 0.9 % flush 10 mL (has no administration in time range)     ECG/EMG Results (last 24 hours)     ** No results found for the last 24 hours. **        No orders to display     Recent Results (from the past 24 hour(s))   Light Blue Top     Collection Time: 02/10/20  6:44 AM   Result Value Ref Range    Extra Tube hold for add-on    Green Top (Gel)    Collection Time: 02/10/20  6:44 AM   Result Value Ref Range    Extra Tube Hold for add-ons.    Lavender Top    Collection Time: 02/10/20  6:44 AM   Result Value Ref Range    Extra Tube hold for add-on    Gold Top - SST    Collection Time: 02/10/20  6:44 AM   Result Value Ref Range    Extra Tube Hold for add-ons.    Urinalysis With Culture If Indicated - Urine, Clean Catch    Collection Time: 02/10/20  7:56 AM   Result Value Ref Range    Color, UA Yellow Yellow, Straw    Appearance, UA Clear Clear    pH, UA 6.5 5.0 - 8.0    Specific Gravity, UA 1.026 1.001 - 1.030    Glucose, UA >=1000 mg/dL (3+) (A) Negative    Ketones, UA Negative Negative    Bilirubin, UA Negative Negative    Blood, UA Negative Negative    Protein, UA Negative Negative    Leuk Esterase, UA Negative Negative    Nitrite, UA Negative Negative    Urobilinogen, UA 0.2 E.U./dL 0.2 - 1.0 E.U./dL     Note: In addition to lab results from this visit, the labs listed above may include labs taken at another facility or during a different encounter within the last 24 hours. Please correlate lab times with ED admission and discharge times for further clarification of the services performed during this visit.    CT Abdomen Pelvis Without Contrast   Preliminary Result   1. No evidence of obstructive uropathy identified. There is chronic   appearing perinephric stranding which appears slightly more pronounced   when compared to 09/15/2017. Correlate with urinalysis to exclude   inflammation of the ureters and kidneys in the setting of an ascending   bladder infection.       2. Advanced degenerative changes in the lumbar spine are identified with   bilateral L5 pars and interarticularis defects and grade 2   anterolisthesis of L5 on S1.  No evidence of acute fracture and/or   dislocation appreciated.       3. Postsurgical prostatectomy changes.       4.  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       - 8 mm hyperdense right superior pole renal lesion which is too small to   accurately characterize by CT criteria, although does not meet criteria   for simple cyst and may represent hyperdense cyst, however, underlying   renal lesion is not excluded and consider followup surveillance imaging   as clinically appropriate.       D:  02/10/2020   E:  02/10/2020          CT Lumbar Spine Without Contrast   Preliminary Result   1. No evidence of obstructive uropathy identified. There is chronic   appearing perinephric stranding which appears slightly more pronounced   when compared to 09/15/2017. Correlate with urinalysis to exclude   inflammation of the ureters and kidneys in the setting of an ascending   bladder infection.       2. Advanced degenerative changes in the lumbar spine are identified with   bilateral L5 pars and interarticularis defects and grade 2   anterolisthesis of L5 on S1.  No evidence of acute fracture and/or   dislocation appreciated.       3. Postsurgical prostatectomy changes.       4. 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       - 8 mm hyperdense right superior pole renal lesion which is too small to   accurately characterize by CT criteria, although does not meet criteria   for simple cyst and may represent hyperdense cyst, however, underlying   renal lesion is not excluded and consider followup surveillance imaging   as clinically appropriate.       D:  02/10/2020   E:  02/10/2020            Vitals:    02/10/20 0828 02/10/20 0829 02/10/20 0830 02/10/20  0845   BP:   161/89 109/51   BP Location:       Patient Position:       Pulse: 88 96 89 91   Resp:       Temp:       TempSrc:       SpO2: 96% 94% 92% 94%   Weight:       Height:         Medications   sodium chloride 0.9 % flush 10 mL (has no administration in time range)     ECG/EMG Results (last 24 hours)     ** No results found for the last 24 hours. **        No orders to display                         He Coma Scale Score: 15                            MDM  Number of Diagnoses or Management Options  Strain of muscle, fascia and tendon of lower back, initial encounter: new and requires workup  Diagnosis management comments: Apply heat to the low back for 20 minutes at a time perform regular stretching.    Take Tylenol or ibuprofen as needed for pain.    Take Skelaxin as needed for pain not well controlled by Tylenol or ibuprofen.    Follow-up with primary care physician for recheck within the next week.  Discussed follow-up imaging with your primary care physician for renal lesion, and aortic ectasia.     Return to the ER with any further concern.       Amount and/or Complexity of Data Reviewed  Clinical lab tests: reviewed and ordered  Tests in the radiology section of CPT®: ordered and reviewed  Review and summarize past medical records: yes  Independent visualization of images, tracings, or specimens: yes        Final diagnoses:   Strain of muscle, fascia and tendon of lower back, initial encounter       Documentation assistance provided by [facility].  Information recorded by the shahid was done at my direction and has been verified and validated by me.     [facility]  02/10/20 0945       Tari Bucio MD  02/10/20 1008

## 2020-02-10 NOTE — DISCHARGE INSTRUCTIONS
For no more than 20 minutes at a time to the low back.  Perform regular stretching.    Take Tylenol or ibuprofen as needed for pain.    Take skelaxin as needed for muscle pain not controlled by tylenol or ibuprofen.

## 2020-02-11 ENCOUNTER — PATIENT OUTREACH (OUTPATIENT)
Dept: CASE MANAGEMENT | Facility: OTHER | Age: 85
End: 2020-02-11

## 2020-02-11 NOTE — OUTREACH NOTE
Patient Outreach Note    RN-ACM outreach to patient (KERLINE ARAUJO).  Patient presented to ED at [facility] on 02/10/2020 with c/o back pain.  Patient was treated and discharged to home without acute admission.  Patient expressed appreciation for care received during ED visit and stated his pain had resolved.  He reported having taken a muscle relaxer this afternoon but had not needed to take the pain pills he was prescribed.  Patient has f/u appointment scheduled with PCP tomorrow and stated plans to discuss  results of the scans taken during the ED visit.  Education, driving precautions, and discharge summary reviewed with patient.  Patient denied other needs/questions/concerns. Contact information provided.      Nubia Cross RN  Ambulatory     2/11/2020, 4:32 PM

## 2020-02-12 ENCOUNTER — OFFICE VISIT (OUTPATIENT)
Dept: INTERNAL MEDICINE | Facility: CLINIC | Age: 85
End: 2020-02-12

## 2020-02-12 VITALS
BODY MASS INDEX: 32.93 KG/M2 | SYSTOLIC BLOOD PRESSURE: 142 MMHG | DIASTOLIC BLOOD PRESSURE: 70 MMHG | WEIGHT: 204 LBS | HEART RATE: 78 BPM | OXYGEN SATURATION: 97 %

## 2020-02-12 DIAGNOSIS — N28.9 RENAL LESION: ICD-10-CM

## 2020-02-12 DIAGNOSIS — M15.8 OTHER OSTEOARTHRITIS INVOLVING MULTIPLE JOINTS: ICD-10-CM

## 2020-02-12 DIAGNOSIS — E78.00 HYPERCHOLESTEREMIA: ICD-10-CM

## 2020-02-12 DIAGNOSIS — I10 ESSENTIAL HYPERTENSION: Primary | ICD-10-CM

## 2020-02-12 PROCEDURE — 99213 OFFICE O/P EST LOW 20 MIN: CPT | Performed by: INTERNAL MEDICINE

## 2020-02-12 NOTE — PROGRESS NOTES
Subjective   Blane Dietz is a 84 y.o. male.   Chief Complaint   Patient presents with   • Hypertension       Hypertension   This is a chronic problem. The current episode started more than 1 year ago. Pertinent negatives include no chest pain, headaches, neck pain, palpitations or shortness of breath.   Hyperlipidemia   This is a chronic problem. The current episode started more than 1 year ago. Pertinent negatives include no chest pain, myalgias or shortness of breath.   Osteoarthritis   Pertinent negatives include no diarrhea, dysuria, fatigue, fever or rash. His past medical history is significant for osteoarthritis.        Prostate cancer and is following with onc.  Small cyst vs lesion seen on ct scan in ed for flank pain. UA neg except for sugar  The following portions of the patient's history were reviewed and updated as appropriate: allergies, current medications, past family history, past medical history, past social history, past surgical history and problem list.    Review of Systems   Constitutional: Negative for activity change, appetite change, chills, diaphoresis, fatigue, fever and unexpected weight change.   HENT: Negative for congestion, ear discharge, ear pain, mouth sores, nosebleeds, sinus pressure, sneezing and sore throat.    Eyes: Negative for pain, discharge and itching.   Respiratory: Negative for cough, chest tightness, shortness of breath and wheezing.    Cardiovascular: Negative for chest pain, palpitations and leg swelling.   Gastrointestinal: Negative for abdominal pain, constipation, diarrhea, nausea and vomiting.   Endocrine: Negative for cold intolerance, heat intolerance, polydipsia and polyphagia.   Genitourinary: Positive for flank pain. Negative for dysuria, frequency, hematuria and urgency.   Musculoskeletal: Negative for arthralgias, back pain, gait problem, myalgias, neck pain and neck stiffness.   Skin: Negative for color change, pallor and rash.   Neurological: Negative  for seizures, speech difficulty, numbness and headaches.   Psychiatric/Behavioral: Negative for agitation, confusion, decreased concentration and sleep disturbance. The patient is not nervous/anxious.      /70   Pulse 78   Wt 92.5 kg (204 lb)   SpO2 97%   BMI 32.93 kg/m²     Objective   Physical Exam   Constitutional: He appears well-developed.   HENT:   Head: Normocephalic.   Right Ear: External ear normal.   Left Ear: External ear normal.   Nose: Nose normal.   Mouth/Throat: Oropharynx is clear and moist.   Eyes: Pupils are equal, round, and reactive to light. Conjunctivae are normal.   Neck: No JVD present. No thyromegaly present.   Cardiovascular: Normal rate, regular rhythm and normal heart sounds. Exam reveals no friction rub.   No murmur heard.  Pulmonary/Chest: Effort normal and breath sounds normal. No respiratory distress. He has no wheezes. He has no rales.   Abdominal: Soft. Bowel sounds are normal. He exhibits no distension. There is no tenderness. There is no guarding.   Musculoskeletal: He exhibits no edema or tenderness.   Lymphadenopathy:     He has no cervical adenopathy.   Neurological: He displays normal reflexes. No cranial nerve deficit.   Skin: No rash noted.   Psychiatric: His behavior is normal.   Nursing note and vitals reviewed.      Assessment/Plan   Blane was seen today for hypertension and osteoarthritis.    Diagnoses and all orders for this visit:    Essential hypertension  stable  Hypercholesteremia    cmp and lipids  Osteoarthritis  stable  Prostate cancer  Followed by onc. PSA trending back down.   DM  Follows with Endo regarding his blood sugars.

## 2020-03-02 RX ORDER — TRIAMTERENE AND HYDROCHLOROTHIAZIDE 37.5; 25 MG/1; MG/1
CAPSULE ORAL
Qty: 90 CAPSULE | Refills: 2 | Status: SHIPPED | OUTPATIENT
Start: 2020-03-02 | End: 2021-12-06

## 2020-03-18 ENCOUNTER — OFFICE VISIT (OUTPATIENT)
Dept: ONCOLOGY | Facility: CLINIC | Age: 85
End: 2020-03-18

## 2020-03-18 ENCOUNTER — INFUSION (OUTPATIENT)
Dept: ONCOLOGY | Facility: HOSPITAL | Age: 85
End: 2020-03-18

## 2020-03-18 VITALS
HEART RATE: 85 BPM | WEIGHT: 199 LBS | HEIGHT: 66 IN | OXYGEN SATURATION: 99 % | SYSTOLIC BLOOD PRESSURE: 177 MMHG | TEMPERATURE: 98.2 F | DIASTOLIC BLOOD PRESSURE: 78 MMHG | BODY MASS INDEX: 31.98 KG/M2 | RESPIRATION RATE: 16 BRPM

## 2020-03-18 DIAGNOSIS — C61 PROSTATE CANCER (HCC): Primary | ICD-10-CM

## 2020-03-18 LAB
ALBUMIN SERPL-MCNC: 4.3 G/DL (ref 3.5–5.2)
ALBUMIN/GLOB SERPL: 1.5 G/DL
ALP SERPL-CCNC: 44 U/L (ref 39–117)
ALT SERPL W P-5'-P-CCNC: 27 U/L (ref 1–41)
ANION GAP SERPL CALCULATED.3IONS-SCNC: 12.9 MMOL/L (ref 5–15)
AST SERPL-CCNC: 28 U/L (ref 1–40)
BASOPHILS # BLD AUTO: 0.05 10*3/MM3 (ref 0–0.2)
BASOPHILS NFR BLD AUTO: 1.1 % (ref 0–1.5)
BILIRUB SERPL-MCNC: 0.7 MG/DL (ref 0.2–1.2)
BUN BLD-MCNC: 13 MG/DL (ref 8–23)
BUN/CREAT SERPL: 17.3 (ref 7–25)
CALCIUM SPEC-SCNC: 9.5 MG/DL (ref 8.6–10.5)
CHLORIDE SERPL-SCNC: 100 MMOL/L (ref 98–107)
CO2 SERPL-SCNC: 25.1 MMOL/L (ref 22–29)
CREAT BLD-MCNC: 0.75 MG/DL (ref 0.76–1.27)
DEPRECATED RDW RBC AUTO: 43.1 FL (ref 37–54)
EOSINOPHIL # BLD AUTO: 0.18 10*3/MM3 (ref 0–0.4)
EOSINOPHIL NFR BLD AUTO: 4 % (ref 0.3–6.2)
ERYTHROCYTE [DISTWIDTH] IN BLOOD BY AUTOMATED COUNT: 13.2 % (ref 12.3–15.4)
GFR SERPL CREATININE-BSD FRML MDRD: 100 ML/MIN/1.73
GLOBULIN UR ELPH-MCNC: 2.8 GM/DL
GLUCOSE BLD-MCNC: 314 MG/DL (ref 65–99)
HCT VFR BLD AUTO: 41.5 % (ref 37.5–51)
HGB BLD-MCNC: 13.9 G/DL (ref 13–17.7)
IMM GRANULOCYTES # BLD AUTO: 0.03 10*3/MM3 (ref 0–0.05)
IMM GRANULOCYTES NFR BLD AUTO: 0.7 % (ref 0–0.5)
LYMPHOCYTES # BLD AUTO: 1.36 10*3/MM3 (ref 0.7–3.1)
LYMPHOCYTES NFR BLD AUTO: 29.9 % (ref 19.6–45.3)
MCH RBC QN AUTO: 29.8 PG (ref 26.6–33)
MCHC RBC AUTO-ENTMCNC: 33.5 G/DL (ref 31.5–35.7)
MCV RBC AUTO: 88.9 FL (ref 79–97)
MONOCYTES # BLD AUTO: 0.39 10*3/MM3 (ref 0.1–0.9)
MONOCYTES NFR BLD AUTO: 8.6 % (ref 5–12)
NEUTROPHILS # BLD AUTO: 2.54 10*3/MM3 (ref 1.7–7)
NEUTROPHILS NFR BLD AUTO: 55.7 % (ref 42.7–76)
NRBC BLD AUTO-RTO: 0 /100 WBC (ref 0–0.2)
PLATELET # BLD AUTO: 135 10*3/MM3 (ref 140–450)
PMV BLD AUTO: 10.1 FL (ref 6–12)
POTASSIUM BLD-SCNC: 4.4 MMOL/L (ref 3.5–5.2)
PROT SERPL-MCNC: 7.1 G/DL (ref 6–8.5)
PSA SERPL-MCNC: 0.51 NG/ML (ref 0–4)
RBC # BLD AUTO: 4.67 10*6/MM3 (ref 4.14–5.8)
SODIUM BLD-SCNC: 138 MMOL/L (ref 136–145)
WBC NRBC COR # BLD: 4.55 10*3/MM3 (ref 3.4–10.8)

## 2020-03-18 PROCEDURE — 80053 COMPREHEN METABOLIC PANEL: CPT

## 2020-03-18 PROCEDURE — 99214 OFFICE O/P EST MOD 30 MIN: CPT | Performed by: INTERNAL MEDICINE

## 2020-03-18 PROCEDURE — 36415 COLL VENOUS BLD VENIPUNCTURE: CPT

## 2020-03-18 PROCEDURE — 84153 ASSAY OF PSA TOTAL: CPT

## 2020-03-18 PROCEDURE — 85025 COMPLETE CBC W/AUTO DIFF WBC: CPT

## 2020-03-18 PROCEDURE — 25010000002 LEUPROLIDE 22.5 MG KIT: Performed by: INTERNAL MEDICINE

## 2020-03-18 PROCEDURE — 96402 CHEMO HORMON ANTINEOPL SQ/IM: CPT

## 2020-03-18 RX ORDER — DOXYCYCLINE HYCLATE 100 MG/1
CAPSULE ORAL
COMMUNITY
End: 2020-03-18

## 2020-03-18 RX ORDER — CLOBETASOL PROPIONATE 0.5 MG/G
OINTMENT TOPICAL
COMMUNITY
End: 2020-09-03

## 2020-03-18 RX ORDER — SACCHAROMYCES BOULARDII 250 MG
250 CAPSULE ORAL
COMMUNITY
End: 2021-12-06

## 2020-03-18 RX ORDER — GLIMEPIRIDE 4 MG/1
TABLET ORAL
COMMUNITY
End: 2020-06-24

## 2020-03-18 RX ORDER — BENZONATATE 200 MG/1
CAPSULE ORAL
COMMUNITY
End: 2020-06-24

## 2020-03-18 RX ORDER — SULFAMETHOXAZOLE AND TRIMETHOPRIM 800; 160 MG/1; MG/1
TABLET ORAL
COMMUNITY
End: 2020-03-18

## 2020-03-18 RX ORDER — MUPIROCIN CALCIUM 20 MG/G
1 CREAM TOPICAL DAILY PRN
COMMUNITY
End: 2021-12-06

## 2020-03-18 RX ORDER — SILDENAFIL 50 MG/1
TABLET, FILM COATED ORAL
COMMUNITY
End: 2020-06-24

## 2020-03-18 RX ORDER — BICALUTAMIDE 50 MG/1
TABLET, FILM COATED ORAL
COMMUNITY
End: 2020-06-24

## 2020-03-18 RX ADMIN — LEUPROLIDE ACETATE 22.5 MG: KIT SUBCUTANEOUS at 14:59

## 2020-03-18 NOTE — PROGRESS NOTES
DATE OF VISIT: 3/18/2020     REASON FOR VISIT:   1. Prostate cancer. Presented with stage I, [id number], PSA at diagnosis 3 status  post prostatectomy in 2000.  2. Relapsed disease with rise in PSA gradually over the last couple of years,  most recent PSA 7.1 ng/mL on 01/25/2016.      HISTORY OF PRESENT ILLNESS: The patient is a very pleasant 80-year-old  gentleman with past medical history significant for prostate cancer status post  radical prostatectomy in 2000. The patient never received adjuvant treatments,  neither radiation, GnRH or antiandrogen treatment. The patient's PSA started  to go up gradually over the last 2 years. Patient was referred to me on  06/13/2014 and I did restaging workup that came back negative including bone  scan, CAT scans chest, abdomen and pelvis. We elected to proceed with watchful  waiting. Patient is here today in scheduled followup visit.     SUBJECTIVE: Mr. Dietz is here today is here today by himself.  He is doing fairly well.  He denies fever chills night sweats.  He has been staying at home.  He would like to see urology secondary to renal cyst.    REVIEW OF SYSTEMS: All the other 9 systems are reviewed by me and negative  except what is mentioned in HPI and subjective.      PAST MEDICAL HISTORY/SOCIAL HISTORY/FAMILY HISTORY: Unchanged from my prior  documentation on 06/13/2014.       Current Outpatient Medications:   •  allopurinol (ZYLOPRIM) 300 MG tablet, Take 1 tablet by mouth Daily., Disp: 90 tablet, Rfl: 1  •  aspirin 81 MG EC tablet, Take 81 mg by mouth Daily., Disp: , Rfl:   •  CRISTINA CONTOUR TEST test strip, , Disp: , Rfl:   •  benzonatate (TESSALON) 200 MG capsule, benzonatate 200 mg capsule, Disp: , Rfl:   •  bicalutamide (CASODEX) 50 MG chemo tablet, bicalutamide 50 mg tablet, Disp: , Rfl:   •  Cholecalciferol (VITAMIN D3) 2000 UNITS capsule, Take  by mouth 2 (two) times a day., Disp: , Rfl:   •  Cinnamon 500 MG capsule, Take 2,000 mg by mouth Daily., Disp: , Rfl:   •   clobetasol (TEMOVATE) 0.05 % ointment, clobetasol 0.05 % topical ointment, Disp: , Rfl:   •  Coenzyme Q10 100 MG tablet, Take 2 tablets by mouth Daily., Disp: , Rfl:   •  fenofibric acid (TRILIPIX) 135 MG capsule delayed-release delayed release capsule, TAKE 1 CAPSULE BY MOUTH ONE TIME A DAY , Disp: 30 capsule, Rfl: 2  •  fluticasone (FLONASE) 50 MCG/ACT nasal spray, 2 sprays into the nostril(s) as directed by provider Daily. 2 puffs each nostril, Disp: 1 bottle, Rfl: 0  •  glimepiride (AMARYL) 4 MG tablet, glimepiride 4 mg tablet, Disp: , Rfl:   •  hydroCHLOROthiazide (MICROZIDE PO), hydrochlorothiazide, Disp: , Rfl:   •  HYDROcodone-acetaminophen (NORCO) 5-325 MG per tablet, Take 1 tablet by mouth Every 6 (Six) Hours As Needed for Severe Pain ., Disp: 12 tablet, Rfl: 0  •  JARDIANCE 10 MG tablet, 10 mg Daily., Disp: , Rfl:   •  Lecithin 1200 MG capsule, Take  by mouth Daily., Disp: , Rfl:   •  metaxalone (SKELAXIN) 800 MG tablet, Take 1 tablet by mouth 3 (Three) Times a Day As Needed for Muscle Spasms., Disp: 12 tablet, Rfl: 0  •  metFORMIN (GLUCOPHAGE) 500 MG tablet, Take 2 tablets by mouth 2 (Two) Times a Day With Meals., Disp: 120 tablet, Rfl: 2  •  metoprolol succinate XL (TOPROL-XL) 100 MG 24 hr tablet, TAKE 1 TABLET BY MOUTH ONE TIME A DAY , Disp: 30 tablet, Rfl: 0  •  Multiple Vitamins-Minerals (PRESERVISION AREDS 2+MULTI VIT PO), Take  by mouth., Disp: , Rfl:   •  mupirocin (BACTROBAN) 2 % cream, mupirocin calcium 2 % topical cream, Disp: , Rfl:   •  nystatin (MYCOSTATIN) 210626 UNIT/GM powder, Apply  topically to the appropriate area as directed Daily As Needed (rash)., Disp: 60 g, Rfl: 0  •  pravastatin (PRAVACHOL) 40 MG tablet, TAKE ONE TABLET BY MOUTH EVERY NIGHT AT BEDTIME, Disp: 90 tablet, Rfl: 0  •  saccharomyces boulardii (Florastor) 250 MG capsule, Florastor 250 mg capsule, Disp: , Rfl:   •  sildenafil (VIAGRA) 50 MG tablet, sildenafil 50 mg tablet  Take 1 tablet every day by oral route., Disp: ,  "Rfl:   •  SITagliptin (Januvia) 100 MG tablet, Januvia 100 mg tablet, Disp: , Rfl:   •  triamterene-hydrochlorothiazide (DYAZIDE) 37.5-25 MG per capsule, TAKE ONE CAPSULE BY MOUTH DAILY, Disp: 90 capsule, Rfl: 2  •  TRULICITY 0.75 MG/0.5ML solution pen-injector, , Disp: , Rfl:     PHYSICAL EXAMINATION:   /78   Pulse 85   Temp 98.2 °F (36.8 °C) (Oral)   Resp 16   Ht 167.6 cm (66\")   Wt 90.3 kg (199 lb)   SpO2 99%   BMI 32.12 kg/m²   ECOG1  GENERAL: Age appropriate. No acute distress.   HEENT: Head atraumatic, normocephalic.   NECK: Supple. No JVD. No lymphadenopathy.   LUNGS: Clear to auscultation bilaterally. No wheezing. No rhonchi.   HEART: Regular rate and rhythm. S1, S2, no murmurs.   ABDOMEN: Soft, nontender, nondistended. Bowel sounds positive. No  hepatosplenomegaly.   EXTREMITIES: No clubbing, cyanosis, or edema.   SKIN: No rashes. No purpura.   NEUROLOGIC: Awake and oriented x3. Strength 5 out of 5 in all muscle groups.     No visits with results within 2 Week(s) from this visit.   Latest known visit with results is:   Admission on 02/10/2020, Discharged on 02/10/2020   Component Date Value Ref Range Status   • Extra Tube 02/10/2020 hold for add-on   Final    Auto resulted   • Extra Tube 02/10/2020 Hold for add-ons.   Final    Auto resulted.   • Extra Tube 02/10/2020 hold for add-on   Final    Auto resulted   • Extra Tube 02/10/2020 Hold for add-ons.   Final    Auto resulted.   • Color, UA 02/10/2020 Yellow  Yellow, Straw Final   • Appearance, UA 02/10/2020 Clear  Clear Final   • pH, UA 02/10/2020 6.5  5.0 - 8.0 Final   • Specific Gravity, UA 02/10/2020 1.026  1.001 - 1.030 Final   • Glucose, UA 02/10/2020 >=1000 mg/dL (3+)* Negative Final   • Ketones, UA 02/10/2020 Negative  Negative Final   • Bilirubin, UA 02/10/2020 Negative  Negative Final   • Blood, UA 02/10/2020 Negative  Negative Final   • Protein, UA 02/10/2020 Negative  Negative Final   • Leuk Esterase, UA 02/10/2020 Negative  Negative " Final   • Nitrite, UA 02/10/2020 Negative  Negative Final   • Urobilinogen, UA 02/10/2020 0.2 E.U./dL  0.2 - 1.0 E.U./dL Final        ASSESSMENT: The patient is a very pleasant 80-year-old gentleman with relapsed  prostate cancer.     PROBLEM LIST:  1. Prostate cancer, initially presented as [id number] status post radical  prostatectomy 2000.  2. Rise in PSA gradually over the last 2 years,   3. Multiple comorbidities including type 2 diabetes, hypertension, morbid  obesity, osteoarthritis, and hypercholesterolemia.  4. Erectile dysfunction.   5.  Family history of colon cancer  6.  Osteopenia  7.  Renal cyst    PLAN:  1. I did go over the blood work results in detail with the patient. I explained to him that  his last PSA on 1 December 18, 2019 was 0.306.   I will continue Eligard 22.5mg q3 month.   2. The patient will come back to see me in 3 months with repeat blood work as well as serum PSA.   3. I will continue Viagra as needed for erectile dysfunction.   4.  He will continue with Mo and Trulicity subcu for type 2 diabetes.  5.  We'll continue pravastatin for hypercholesterolemia  6.  Patient need to have 5 year follow-up colonoscopy which would be due 11/2021.  7. We reviewed the potential side effects of the treatment including hot flashes, impotence, joint pain, decrease in bone density, mood or sleep disturbance, and asthenia.  8.  I'll continue the patient on Prolia 60 mg subcutaneous every 6 month.  His next dose will be due September 2019.  We'll continue calcium with vitamin D daily.  9.  We will refer the patient to see Dr. Jaimes for right renal cyst.    Shannan Ramon MD  3/18/2020

## 2020-03-21 DIAGNOSIS — M1A.0710 IDIOPATHIC CHRONIC GOUT OF RIGHT FOOT WITHOUT TOPHUS: ICD-10-CM

## 2020-03-22 RX ORDER — METOPROLOL SUCCINATE 100 MG/1
TABLET, EXTENDED RELEASE ORAL
Qty: 30 TABLET | Refills: 0 | Status: SHIPPED | OUTPATIENT
Start: 2020-03-22 | End: 2020-07-07

## 2020-03-22 RX ORDER — ALLOPURINOL 300 MG/1
TABLET ORAL
Qty: 90 TABLET | Refills: 0 | Status: SHIPPED | OUTPATIENT
Start: 2020-03-22 | End: 2022-01-14

## 2020-04-13 ENCOUNTER — TELEPHONE (OUTPATIENT)
Dept: INTERNAL MEDICINE | Facility: CLINIC | Age: 85
End: 2020-04-13

## 2020-04-13 DIAGNOSIS — C61 PROSTATE CANCER (HCC): Primary | ICD-10-CM

## 2020-04-13 NOTE — TELEPHONE ENCOUNTER
Patient requesting a new referral to Urology due to a bad experience at previously referred to Urologist.  Patient requests to be referred to Burton Jaimes at [facility] in [place].

## 2020-05-08 ENCOUNTER — APPOINTMENT (OUTPATIENT)
Dept: MRI IMAGING | Facility: HOSPITAL | Age: 85
End: 2020-05-08

## 2020-05-08 ENCOUNTER — HOSPITAL ENCOUNTER (EMERGENCY)
Facility: HOSPITAL | Age: 85
Discharge: HOME | End: 2020-05-08
Attending: EMERGENCY MEDICINE | Admitting: EMERGENCY MEDICINE

## 2020-05-08 VITALS
DIASTOLIC BLOOD PRESSURE: 91 MMHG | HEIGHT: 66 IN | OXYGEN SATURATION: 96 % | HEART RATE: 80 BPM | SYSTOLIC BLOOD PRESSURE: 173 MMHG | TEMPERATURE: 98.5 F | WEIGHT: 200 LBS | RESPIRATION RATE: 20 BRPM | BODY MASS INDEX: 32.14 KG/M2

## 2020-05-08 DIAGNOSIS — M54.50 ACUTE LEFT-SIDED LOW BACK PAIN WITHOUT SCIATICA: Primary | ICD-10-CM

## 2020-05-08 DIAGNOSIS — M51.36 DEGENERATIVE DISC DISEASE, LUMBAR: ICD-10-CM

## 2020-05-08 LAB
ALBUMIN SERPL-MCNC: 4.6 G/DL (ref 3.5–5.2)
ALBUMIN/GLOB SERPL: 1.5 G/DL
ALP SERPL-CCNC: 42 U/L (ref 39–117)
ALT SERPL W P-5'-P-CCNC: 36 U/L (ref 1–41)
ANION GAP SERPL CALCULATED.3IONS-SCNC: 14 MMOL/L (ref 5–15)
AST SERPL-CCNC: 40 U/L (ref 1–40)
BASOPHILS # BLD AUTO: 0.09 10*3/MM3 (ref 0–0.2)
BASOPHILS NFR BLD AUTO: 1.1 % (ref 0–1.5)
BILIRUB SERPL-MCNC: 0.8 MG/DL (ref 0.2–1.2)
BILIRUB UR QL STRIP: NEGATIVE
BUN BLD-MCNC: 12 MG/DL (ref 8–23)
BUN/CREAT SERPL: 14.1 (ref 7–25)
CALCIUM SPEC-SCNC: 9.4 MG/DL (ref 8.6–10.5)
CHLORIDE SERPL-SCNC: 101 MMOL/L (ref 98–107)
CLARITY UR: CLEAR
CO2 SERPL-SCNC: 25 MMOL/L (ref 22–29)
COLOR UR: YELLOW
CREAT BLD-MCNC: 0.85 MG/DL (ref 0.76–1.27)
DEPRECATED RDW RBC AUTO: 42.9 FL (ref 37–54)
EOSINOPHIL # BLD AUTO: 0.22 10*3/MM3 (ref 0–0.4)
EOSINOPHIL NFR BLD AUTO: 2.6 % (ref 0.3–6.2)
ERYTHROCYTE [DISTWIDTH] IN BLOOD BY AUTOMATED COUNT: 13.2 % (ref 12.3–15.4)
GFR SERPL CREATININE-BSD FRML MDRD: 86 ML/MIN/1.73
GLOBULIN UR ELPH-MCNC: 3.1 GM/DL
GLUCOSE BLD-MCNC: 162 MG/DL (ref 65–99)
GLUCOSE UR STRIP-MCNC: ABNORMAL MG/DL
HCT VFR BLD AUTO: 44.3 % (ref 37.5–51)
HGB BLD-MCNC: 14.5 G/DL (ref 13–17.7)
HGB UR QL STRIP.AUTO: NEGATIVE
IMM GRANULOCYTES # BLD AUTO: 0.06 10*3/MM3 (ref 0–0.05)
IMM GRANULOCYTES NFR BLD AUTO: 0.7 % (ref 0–0.5)
KETONES UR QL STRIP: ABNORMAL
LEUKOCYTE ESTERASE UR QL STRIP.AUTO: NEGATIVE
LYMPHOCYTES # BLD AUTO: 1.9 10*3/MM3 (ref 0.7–3.1)
LYMPHOCYTES NFR BLD AUTO: 22.5 % (ref 19.6–45.3)
MCH RBC QN AUTO: 29.3 PG (ref 26.6–33)
MCHC RBC AUTO-ENTMCNC: 32.7 G/DL (ref 31.5–35.7)
MCV RBC AUTO: 89.5 FL (ref 79–97)
MONOCYTES # BLD AUTO: 0.57 10*3/MM3 (ref 0.1–0.9)
MONOCYTES NFR BLD AUTO: 6.8 % (ref 5–12)
NEUTROPHILS # BLD AUTO: 5.59 10*3/MM3 (ref 1.7–7)
NEUTROPHILS NFR BLD AUTO: 66.3 % (ref 42.7–76)
NITRITE UR QL STRIP: NEGATIVE
NRBC BLD AUTO-RTO: 0 /100 WBC (ref 0–0.2)
PH UR STRIP.AUTO: 5.5 [PH] (ref 5–8)
PLATELET # BLD AUTO: 184 10*3/MM3 (ref 140–450)
PMV BLD AUTO: 9.9 FL (ref 6–12)
POTASSIUM BLD-SCNC: 4.7 MMOL/L (ref 3.5–5.2)
PROT SERPL-MCNC: 7.7 G/DL (ref 6–8.5)
PROT UR QL STRIP: NEGATIVE
RBC # BLD AUTO: 4.95 10*6/MM3 (ref 4.14–5.8)
SODIUM BLD-SCNC: 140 MMOL/L (ref 136–145)
SP GR UR STRIP: 1.02 (ref 1–1.03)
UROBILINOGEN UR QL STRIP: ABNORMAL
WBC NRBC COR # BLD: 8.43 10*3/MM3 (ref 3.4–10.8)

## 2020-05-08 PROCEDURE — 85025 COMPLETE CBC W/AUTO DIFF WBC: CPT | Performed by: NURSE PRACTITIONER

## 2020-05-08 PROCEDURE — 99284 EMERGENCY DEPT VISIT MOD MDM: CPT

## 2020-05-08 PROCEDURE — 63710000001 PREDNISONE PER 1 MG: Performed by: NURSE PRACTITIONER

## 2020-05-08 PROCEDURE — 80053 COMPREHEN METABOLIC PANEL: CPT | Performed by: NURSE PRACTITIONER

## 2020-05-08 PROCEDURE — 81003 URINALYSIS AUTO W/O SCOPE: CPT | Performed by: NURSE PRACTITIONER

## 2020-05-08 PROCEDURE — P9612 CATHETERIZE FOR URINE SPEC: HCPCS

## 2020-05-08 PROCEDURE — 72148 MRI LUMBAR SPINE W/O DYE: CPT

## 2020-05-08 RX ORDER — METHOCARBAMOL 750 MG/1
750 TABLET, FILM COATED ORAL ONCE
Status: COMPLETED | OUTPATIENT
Start: 2020-05-08 | End: 2020-05-08

## 2020-05-08 RX ORDER — METHOCARBAMOL 750 MG/1
750 TABLET, FILM COATED ORAL 4 TIMES DAILY
Status: DISCONTINUED | OUTPATIENT
Start: 2020-05-08 | End: 2020-05-08 | Stop reason: HOSPADM

## 2020-05-08 RX ORDER — SODIUM CHLORIDE 0.9 % (FLUSH) 0.9 %
10 SYRINGE (ML) INJECTION AS NEEDED
Status: DISCONTINUED | OUTPATIENT
Start: 2020-05-08 | End: 2020-05-08 | Stop reason: HOSPADM

## 2020-05-08 RX ORDER — METHOCARBAMOL 500 MG/1
500 TABLET, FILM COATED ORAL 3 TIMES DAILY PRN
Qty: 30 TABLET | Refills: 0 | Status: ON HOLD | OUTPATIENT
Start: 2020-05-08 | End: 2022-06-03

## 2020-05-08 RX ORDER — METHOCARBAMOL 750 MG/1
750 TABLET, FILM COATED ORAL 4 TIMES DAILY
Status: DISCONTINUED | OUTPATIENT
Start: 2020-05-08 | End: 2020-05-08 | Stop reason: SDUPTHER

## 2020-05-08 RX ORDER — PREDNISONE 20 MG/1
20 TABLET ORAL 2 TIMES DAILY
Qty: 10 TABLET | Refills: 0 | Status: SHIPPED | OUTPATIENT
Start: 2020-05-08 | End: 2020-05-13

## 2020-05-08 RX ORDER — PREDNISONE 20 MG/1
60 TABLET ORAL ONCE
Status: COMPLETED | OUTPATIENT
Start: 2020-05-08 | End: 2020-05-08

## 2020-05-08 RX ADMIN — PREDNISONE 60 MG: 20 TABLET ORAL at 18:20

## 2020-05-08 RX ADMIN — METHOCARBAMOL 750 MG: 750 TABLET ORAL at 18:20

## 2020-05-08 NOTE — DISCHARGE INSTRUCTIONS
Medications as directed. Do NOT COMBINE METAXALONE with the prescribed muscle relaxer,  Follow-up with the neurosurgery service, calling Monday.  Dr. Connolly's number has been provided.  Take all necessary precautions to avoid falls.  Return to the emergency department as needed for worsening symptoms or concerns.  A heating pad can be occasionally helpful.  Take caution to avoid burns by heating pad.  Thank you

## 2020-05-09 NOTE — ED PROVIDER NOTES
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  Patient states that while he was aware that he was placing strain on his lower back while lifting, he did not experience significant discomfort until several hours afterward.  Patient's pain is reproduced by walking and turning over in bed or bending over.  It is also reproduced somewhat with palpation.  Patient has noticed no abdominal pain or pain in his testicles.  He has no hematuria.  He has a remote history of disc disease for which he does not require medication on a routine basis.  He has not seen a medical provider for back pain in more than a decade.    Patient has had no neurosensory changes or focal weakness.  Patient affirms he is able to ambulate, though it is so painful, walking is inhibited.  Patient denies any fever, vomiting or diarrhea. He has observe for gross hematuria and noted none.     Mr. Snowden has a past medical history of prostate cancer with prostatectomy in 1999.  He continues to take Lupron injections every quarter to control his PSA, under the care of Dr. Ramon in [place].        History provided by:  Patient   used: No    Back Pain   Location:  Lumbar spine  Quality:  Aching  Radiates to:  Does not radiate  Pain severity:  Moderate  Onset quality:  Gradual  Duration:  4 days  Timing:  Intermittent  Progression:  Worsening  Chronicity:  New  Context: lifting heavy objects, physical stress, recent injury and twisting    Relieved by:  Being still  Worsened by:  Ambulation, bending, movement, sitting, touching and twisting  Associated symptoms: no abdominal pain, no abdominal swelling, no bladder incontinence, no bowel incontinence, no chest pain, no dysuria, no fever, no leg pain, no numbness, no paresthesias, no pelvic pain, no perianal numbness, no tingling, no weakness and no  weight loss    Risk factors: hx of cancer, lack of exercise and obesity        Review of Systems   Constitutional: Negative for fever and weight loss.   Cardiovascular: Negative.  Negative for chest pain.   Gastrointestinal: Negative.  Negative for abdominal pain and bowel incontinence.   Endocrine: Negative.    Genitourinary: Negative.  Negative for bladder incontinence, dysuria and pelvic pain.   Musculoskeletal: Positive for back pain. Negative for joint swelling and neck pain.   Skin: Negative.    Allergic/Immunologic: Negative.    Neurological: Negative for dizziness, tingling, syncope, speech difficulty, weakness, numbness and paresthesias.   Hematological: Does not bruise/bleed easily.   Psychiatric/Behavioral: Negative.    All other systems reviewed and are negative.      Past Medical History:   Diagnosis Date   • Aortic stenosis     status post ROSS procedure, remote, with December 2015 echocardiography revealing normal aortic and pulmonary valve function, normal ejection fraction and mild to moderate MR   • Arthritis    • Depression    • Diabetes mellitus (CMS/HCC)    • Diverticulitis    • Exogenous obesity    • Gout    • Heart murmur    • History of vitamin D deficiency    • Hyperlipidemia    • Hypertension    • Malignant neoplasm of prostate (CMS/HCC)    • Sleep apnea    • Vertigo        Allergies   Allergen Reactions   • Ampicillin Anaphylaxis   • Medrol [Methylprednisolone] Unknown - High Severity     Made his blood sugar get really high, can't take this   • Myrbetriq [Mirabegron] Unknown - High Severity     High BP   • Penicillins Anaphylaxis   • Bee Venom Swelling       Past Surgical History:   Procedure Laterality Date   • CARDIAC VALVE REPLACEMENT     • COLON SURGERY     • COLONOSCOPY     • COLOSTOMY  1999   • COLOSTOMY REVISION     • EXPLORATORY LAPAROTOMY      With Tissue Removal   • LIPOMA EXCISION     • MOHS SURGERY     • OTHER SURGICAL HISTORY      Porcine Valve   • PROSTATE SURGERY     •  PROSTATECTOMY  2000     History of Prostatectomy Perineal Radical   • SKIN CANCER EXCISION      from head and lip   • TONSILLECTOMY         Family History   Problem Relation Age of Onset   • Diabetes Mother    • Lung cancer Mother    • Cancer Mother    • Heart disease Father    • Breast cancer Other    • Cancer Other    • Hypertension Other    • Migraines Other    • Obesity Other    • Diabetes Other        Social History     Socioeconomic History   • Marital status: Single     Spouse name: Not on file   • Number of children: Not on file   • Years of education: Not on file   • Highest education level: Not on file   Tobacco Use   • Smoking status: Never Smoker   • Smokeless tobacco: Never Used   Substance and Sexual Activity   • Alcohol use: No   • Drug use: No   • Sexual activity: Defer           Objective   Physical Exam   Constitutional: He is oriented to person, place, and time. He appears well-developed and well-nourished.   Mr. Dietz is a well-appearing gentleman who is an excellent historian.  He appears uncomfortable as he shifts in the bed, seeking relief.  Vital signs are stable   HENT:   Head: Normocephalic and atraumatic.   Mouth/Throat: Oropharynx is clear and moist.   Eyes: Pupils are equal, round, and reactive to light. EOM are normal.   Neck: Normal range of motion. Neck supple.   Cardiovascular: Normal rate and regular rhythm.   Pulmonary/Chest: Effort normal and breath sounds normal. He has no wheezes. He has no rales.   Abdominal: Soft. Bowel sounds are normal. He exhibits no distension. There is no tenderness. There is no rebound and no guarding.   Musculoskeletal: Normal range of motion. He exhibits no edema.   Patient identifies the level of his pain in the lumbar area.  I am able to reproduce it with deep palpation on the left.  Patient straight leg raise is negative.  His pelvis is stable.  Patient has normal strength and sensation to the lower extremities.  No neurosensory deficits or focal  weakness   Neurological: He is alert and oriented to person, place, and time.   Skin: Skin is warm and dry. Capillary refill takes less than 2 seconds. He is not diaphoretic.   Psychiatric: He has a normal mood and affect. His behavior is normal. Judgment and thought content normal.   Nursing note and vitals reviewed.      Procedures           ED Course  ED Course as of May 08 2147   Fri May 08, 2020   1700 Patient's imaging is remarkable for disc disease.  No metastatic lesions are appreciated, considering the patient's history.  I have encouraged Mr. Dietz to follow-up with the neurosurgery team.  Patient had an excellent outcome with use of muscle relaxers at home from a prescription which is likely .  With this president, we will initiate Robaxin together with steroids and close follow-up.  Patient understands and concurs with this outpatient plan of care with follow-up.    [MS]   2889 CASIE query complete. Treatment plan to include limited course of prescribed  controlled substance. Risks including addiction, benefits, and alternatives presented to patient.   Request number: [id number]     [BS]   1728 Patient denies allergy to PREDNISONE       [MS]      ED Course User Index  [BS] Roosevelt Kaur  [MS] Lin Beard, HIPOLITO                                           [facility]    Final diagnoses:   Acute left-sided low back pain without sciatica   Degenerative disc disease, lumbar            Lin Beard APRN  20

## 2020-05-12 ENCOUNTER — OFFICE VISIT (OUTPATIENT)
Dept: INTERNAL MEDICINE | Facility: CLINIC | Age: 85
End: 2020-05-12

## 2020-05-12 VITALS
HEIGHT: 66 IN | WEIGHT: 188 LBS | BODY MASS INDEX: 30.22 KG/M2 | SYSTOLIC BLOOD PRESSURE: 160 MMHG | HEART RATE: 78 BPM | TEMPERATURE: 97 F | DIASTOLIC BLOOD PRESSURE: 88 MMHG | OXYGEN SATURATION: 96 %

## 2020-05-12 DIAGNOSIS — M48.061 SPINAL STENOSIS OF LUMBAR REGION WITHOUT NEUROGENIC CLAUDICATION: Primary | ICD-10-CM

## 2020-05-12 PROCEDURE — 99213 OFFICE O/P EST LOW 20 MIN: CPT | Performed by: INTERNAL MEDICINE

## 2020-05-12 NOTE — PROGRESS NOTES
Subjective   Blane Dietz is a 84 y.o. male.   Chief Complaint   Patient presents with   • Follow-up     ER       History of Present Illness   ER f/u on back pain.  Went to ED and had MRI.  Moderate lumbar stenosis.  Back pain now decreased so he stopped his Robaxin but is finishing up his prednisone.    Ct Abdomen Pelvis Without Contrast    Result Date: 2/11/2020  1. No evidence of obstructive uropathy identified. There is chronic appearing perinephric stranding which appears slightly more pronounced when compared to 09/15/2017. Correlate with urinalysis to exclude inflammation of the ureters and kidneys in the setting of an ascending bladder infection.  2. Advanced degenerative changes in the lumbar spine are identified with bilateral L5 pars and interarticularis defects and grade 2 anterolisthesis of L5 on S1.  No evidence of acute fracture and/or dislocation appreciated.  3. Postsurgical prostatectomy changes.  4. 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  - 8 mm hyperdense right superior pole renal lesion which is too small to accurately characterize by CT criteria, although does not meet criteria for simple cyst and may represent hyperdense cyst, however, underlying renal lesion is not excluded and consider followup surveillance imaging as clinically appropriate.  D:  02/10/2020 E:  02/10/2020  This report was finalized on 2/11/2020 9:17 AM by Dr. Howard Juares MD.      Ct Lumbar Spine Without Contrast    Result Date: 2/11/2020  1. No evidence of obstructive uropathy identified. There is chronic appearing perinephric stranding which appears slightly more pronounced when compared to 09/15/2017. Correlate with urinalysis to exclude inflammation of the ureters and kidneys in the setting of an ascending bladder infection.   2. Advanced degenerative changes in the lumbar spine are identified with bilateral L5 pars and interarticularis defects and grade 2 anterolisthesis of L5 on S1.  No evidence of acute fracture and/or dislocation appreciated.  3. Postsurgical prostatectomy changes.  4. 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  - 8 mm hyperdense right superior pole renal lesion which is too small to accurately characterize by CT criteria, although does not meet criteria for simple cyst and may represent hyperdense cyst, however, underlying renal lesion is not excluded and consider followup surveillance imaging as clinically appropriate.  D:  02/10/2020 E:  02/10/2020  This report was finalized on 2/11/2020 9:17 AM by Dr. Howard Juares MD.      Mri Lumbar Spine Without Contrast    Result Date: 5/9/2020  No aggressive bone marrow signal findings of abnormality on this noncontrast evaluation and no compression deformity with anterolisthesis of L5 on S1 and multilevel spondylitic changes reduced at the L4-L5 and L5-S1 levels producing up to mild to moderate spinal canal stenoses and severe neuroforaminal stenoses at these levels as detailed above.   DICTATED:   05/08/2020 EDITED/ls :   05/09/2020  This report was finalized on 5/9/2020 7:03 PM by Dr. Bertram Alvarado.    The following portions of the patient's history were reviewed and updated as appropriate: allergies, current medications, past family history, past medical history, past social history, past surgical history and problem list.    Review of Systems   Constitutional: Negative for activity change, appetite change, chills, diaphoresis, fatigue, fever and unexpected weight change.   HENT: Negative for congestion, dental problem, drooling, ear discharge, ear pain, facial swelling, hearing  "loss, mouth sores, nosebleeds, postnasal drip, rhinorrhea, sinus pressure, sneezing, sore throat, tinnitus, trouble swallowing and voice change.    Eyes: Negative for photophobia, pain, discharge, itching and visual disturbance.   Respiratory: Negative for cough, choking, chest tightness, shortness of breath, wheezing and stridor.    Cardiovascular: Negative for palpitations and leg swelling.   Gastrointestinal: Negative for abdominal distention, abdominal pain, anal bleeding, blood in stool, constipation, diarrhea, nausea and vomiting.   Endocrine: Negative for cold intolerance, heat intolerance, polydipsia and polyphagia.   Genitourinary: Negative for decreased urine volume, discharge, dysuria, enuresis, flank pain, frequency, genital sores, hematuria, scrotal swelling, testicular pain and urgency.   Musculoskeletal: Positive for back pain. Negative for arthralgias, gait problem, joint swelling, myalgias, neck pain and neck stiffness.   Skin: Negative for color change, pallor, rash and wound.   Allergic/Immunologic: Negative for environmental allergies, food allergies and immunocompromised state.   Neurological: Negative for dizziness, tremors, seizures, syncope, facial asymmetry, speech difficulty, weakness, light-headedness, numbness and headaches.   Hematological: Negative for adenopathy. Does not bruise/bleed easily.   Psychiatric/Behavioral: Negative for agitation, behavioral problems, confusion, dysphoric mood, hallucinations, sleep disturbance and suicidal ideas. The patient is not nervous/anxious and is not hyperactive.      /88   Pulse 78   Temp 97 °F (36.1 °C)   Ht 167.6 cm (66\")   Wt 85.3 kg (188 lb)   SpO2 96%   BMI 30.34 kg/m²     Objective   Physical Exam   Constitutional: He appears well-developed and well-nourished.   HENT:   Head: Normocephalic and atraumatic.   Right Ear: External ear normal.   Left Ear: External ear normal.   Nose: Nose normal.   Mouth/Throat: Oropharynx is clear and " moist.   Eyes: Pupils are equal, round, and reactive to light. Conjunctivae and EOM are normal.   Neck: Normal range of motion. Neck supple. No JVD present. No thyromegaly present.   Cardiovascular: Normal rate, regular rhythm, normal heart sounds and intact distal pulses. Exam reveals no gallop and no friction rub.   No murmur heard.  Pulmonary/Chest: Effort normal and breath sounds normal. No respiratory distress. He has no wheezes. He has no rales. He exhibits no tenderness.   Abdominal: Soft. Bowel sounds are normal. He exhibits no distension and no mass. There is no tenderness. There is no rebound and no guarding. No hernia.   Genitourinary: Rectum normal, prostate normal and penis normal.   Musculoskeletal: Normal range of motion.   Lymphadenopathy:     He has no cervical adenopathy.   Neurological: He displays normal reflexes. No cranial nerve deficit. He exhibits normal muscle tone. Coordination normal.   Skin: No rash noted. No erythema. No pallor.   Psychiatric: He has a normal mood and affect.       Assessment/Plan   Blane was seen today for follow-up.    Diagnoses and all orders for this visit:    Spinal stenosis of lumbar region without neurogenic claudication  -     Ambulatory Referral to Neurosurgery

## 2020-06-02 ENCOUNTER — TELEPHONE (OUTPATIENT)
Dept: INTERNAL MEDICINE | Facility: CLINIC | Age: 85
End: 2020-06-02

## 2020-06-02 NOTE — TELEPHONE ENCOUNTER
Patient requested a call back. Patient has an appointment with Dr. Wagner on 6/10/20 and would like to know if he could get the shingles vaccine while here.    Please call and advise. Patient call back [phone]

## 2020-06-10 ENCOUNTER — OFFICE VISIT (OUTPATIENT)
Dept: INTERNAL MEDICINE | Facility: CLINIC | Age: 85
End: 2020-06-10

## 2020-06-10 VITALS
HEIGHT: 66 IN | DIASTOLIC BLOOD PRESSURE: 80 MMHG | BODY MASS INDEX: 30.92 KG/M2 | OXYGEN SATURATION: 95 % | WEIGHT: 192.4 LBS | HEART RATE: 90 BPM | SYSTOLIC BLOOD PRESSURE: 140 MMHG

## 2020-06-10 DIAGNOSIS — E78.00 HYPERCHOLESTEREMIA: ICD-10-CM

## 2020-06-10 DIAGNOSIS — M15.8 OTHER OSTEOARTHRITIS INVOLVING MULTIPLE JOINTS: ICD-10-CM

## 2020-06-10 DIAGNOSIS — C61 PROSTATE CANCER (HCC): ICD-10-CM

## 2020-06-10 DIAGNOSIS — I10 ESSENTIAL HYPERTENSION: Primary | ICD-10-CM

## 2020-06-10 DIAGNOSIS — Z23 NEED FOR VACCINATION: ICD-10-CM

## 2020-06-10 PROCEDURE — 99213 OFFICE O/P EST LOW 20 MIN: CPT | Performed by: INTERNAL MEDICINE

## 2020-06-10 PROCEDURE — 90750 HZV VACC RECOMBINANT IM: CPT | Performed by: INTERNAL MEDICINE

## 2020-06-10 PROCEDURE — 90471 IMMUNIZATION ADMIN: CPT | Performed by: INTERNAL MEDICINE

## 2020-06-10 NOTE — PROGRESS NOTES
Subjective   Blane Dietz is a 84 y.o. male.   Chief Complaint   Patient presents with   • Hypertension       Hypertension   This is a chronic problem. The current episode started more than 1 year ago. Pertinent negatives include no chest pain, headaches, neck pain, palpitations or shortness of breath.   Hyperlipidemia   This is a chronic problem. The current episode started more than 1 year ago. Pertinent negatives include no chest pain, myalgias or shortness of breath.   Osteoarthritis   Pertinent negatives include no diarrhea, dysuria, fatigue, fever or rash. His past medical history is significant for osteoarthritis.        Prostate cancer and is following with onc.    The following portions of the patient's history were reviewed and updated as appropriate: allergies, current medications, past family history, past medical history, past social history, past surgical history and problem list.    Review of Systems   Constitutional: Negative for activity change, appetite change, chills, diaphoresis, fatigue, fever and unexpected weight change.   HENT: Negative for congestion, ear discharge, ear pain, mouth sores, nosebleeds, sinus pressure, sneezing and sore throat.    Eyes: Negative for pain, discharge and itching.   Respiratory: Negative for cough, chest tightness, shortness of breath and wheezing.    Cardiovascular: Negative for chest pain, palpitations and leg swelling.   Gastrointestinal: Negative for abdominal pain, constipation, diarrhea, nausea and vomiting.   Endocrine: Negative for cold intolerance, heat intolerance, polydipsia and polyphagia.   Genitourinary: Negative for dysuria, flank pain, frequency, hematuria and urgency.   Musculoskeletal: Negative for arthralgias, back pain, gait problem, myalgias, neck pain and neck stiffness.   Skin: Negative for color change, pallor and rash.   Neurological: Negative for seizures, speech difficulty, numbness and headaches.   Psychiatric/Behavioral: Negative for  "agitation, confusion, decreased concentration and sleep disturbance. The patient is not nervous/anxious.      /80   Pulse 90   Ht 167.6 cm (66\")   Wt 87.3 kg (192 lb 6.4 oz)   SpO2 95%   BMI 31.05 kg/m²     Objective   Physical Exam   Constitutional: He appears well-developed.   HENT:   Head: Normocephalic.   Right Ear: External ear normal.   Left Ear: External ear normal.   Nose: Nose normal.   Mouth/Throat: Oropharynx is clear and moist.   Eyes: Pupils are equal, round, and reactive to light. Conjunctivae are normal.   Neck: No JVD present. No thyromegaly present.   Cardiovascular: Normal rate, regular rhythm and normal heart sounds. Exam reveals no friction rub.   No murmur heard.  Pulmonary/Chest: Effort normal and breath sounds normal. No respiratory distress. He has no wheezes. He has no rales.   Abdominal: Soft. Bowel sounds are normal. He exhibits no distension. There is no tenderness. There is no guarding.   Musculoskeletal: He exhibits no edema or tenderness.   Lymphadenopathy:     He has no cervical adenopathy.   Neurological: He displays normal reflexes. No cranial nerve deficit.   Skin: No rash noted.   Psychiatric: His behavior is normal.   Nursing note and vitals reviewed.      Assessment/Plan   Blane was seen today for hypertension and osteoarthritis.    Diagnoses and all orders for this visit:    Essential hypertension  stable  Hypercholesteremia  Lipids and cmp  cmp and lipids  Osteoarthritis  stable  Prostate cancer  Followed by onc. PSA trending back down.              "

## 2020-06-24 ENCOUNTER — OFFICE VISIT (OUTPATIENT)
Dept: ONCOLOGY | Facility: CLINIC | Age: 85
End: 2020-06-24

## 2020-06-24 ENCOUNTER — INFUSION (OUTPATIENT)
Dept: ONCOLOGY | Facility: HOSPITAL | Age: 85
End: 2020-06-24

## 2020-06-24 VITALS
BODY MASS INDEX: 30.53 KG/M2 | SYSTOLIC BLOOD PRESSURE: 165 MMHG | OXYGEN SATURATION: 94 % | TEMPERATURE: 97.1 F | DIASTOLIC BLOOD PRESSURE: 85 MMHG | HEART RATE: 86 BPM | WEIGHT: 190 LBS | RESPIRATION RATE: 12 BRPM | HEIGHT: 66 IN

## 2020-06-24 DIAGNOSIS — C61 PROSTATE CANCER (HCC): Primary | ICD-10-CM

## 2020-06-24 LAB
ALBUMIN SERPL-MCNC: 4.2 G/DL (ref 3.5–5.2)
ALBUMIN/GLOB SERPL: 1.8 G/DL
ALP SERPL-CCNC: 59 U/L (ref 39–117)
ALT SERPL W P-5'-P-CCNC: 22 U/L (ref 1–41)
ANION GAP SERPL CALCULATED.3IONS-SCNC: 10.4 MMOL/L (ref 5–15)
AST SERPL-CCNC: 25 U/L (ref 1–40)
BASOPHILS # BLD AUTO: 0.05 10*3/MM3 (ref 0–0.2)
BASOPHILS NFR BLD AUTO: 0.9 % (ref 0–1.5)
BILIRUB SERPL-MCNC: 0.6 MG/DL (ref 0.2–1.2)
BUN BLD-MCNC: 14 MG/DL (ref 8–23)
BUN/CREAT SERPL: 18.2 (ref 7–25)
CALCIUM SPEC-SCNC: 9.3 MG/DL (ref 8.6–10.5)
CHLORIDE SERPL-SCNC: 102 MMOL/L (ref 98–107)
CO2 SERPL-SCNC: 26.6 MMOL/L (ref 22–29)
CREAT BLD-MCNC: 0.77 MG/DL (ref 0.76–1.27)
DEPRECATED RDW RBC AUTO: 44.2 FL (ref 37–54)
EOSINOPHIL # BLD AUTO: 0.18 10*3/MM3 (ref 0–0.4)
EOSINOPHIL NFR BLD AUTO: 3.4 % (ref 0.3–6.2)
ERYTHROCYTE [DISTWIDTH] IN BLOOD BY AUTOMATED COUNT: 13.5 % (ref 12.3–15.4)
GFR SERPL CREATININE-BSD FRML MDRD: 97 ML/MIN/1.73
GLOBULIN UR ELPH-MCNC: 2.3 GM/DL
GLUCOSE BLD-MCNC: 283 MG/DL (ref 65–99)
HCT VFR BLD AUTO: 40.3 % (ref 37.5–51)
HGB BLD-MCNC: 13.5 G/DL (ref 13–17.7)
IMM GRANULOCYTES # BLD AUTO: 0.04 10*3/MM3 (ref 0–0.05)
IMM GRANULOCYTES NFR BLD AUTO: 0.8 % (ref 0–0.5)
LYMPHOCYTES # BLD AUTO: 1.46 10*3/MM3 (ref 0.7–3.1)
LYMPHOCYTES NFR BLD AUTO: 27.7 % (ref 19.6–45.3)
MAGNESIUM SERPL-MCNC: 1.9 MG/DL (ref 1.6–2.4)
MCH RBC QN AUTO: 30 PG (ref 26.6–33)
MCHC RBC AUTO-ENTMCNC: 33.5 G/DL (ref 31.5–35.7)
MCV RBC AUTO: 89.6 FL (ref 79–97)
MONOCYTES # BLD AUTO: 0.39 10*3/MM3 (ref 0.1–0.9)
MONOCYTES NFR BLD AUTO: 7.4 % (ref 5–12)
NEUTROPHILS # BLD AUTO: 3.15 10*3/MM3 (ref 1.7–7)
NEUTROPHILS NFR BLD AUTO: 59.8 % (ref 42.7–76)
NRBC BLD AUTO-RTO: 0 /100 WBC (ref 0–0.2)
PHOSPHATE SERPL-MCNC: 3.5 MG/DL (ref 2.5–4.5)
PLATELET # BLD AUTO: 137 10*3/MM3 (ref 140–450)
PMV BLD AUTO: 10 FL (ref 6–12)
POTASSIUM BLD-SCNC: 3.7 MMOL/L (ref 3.5–5.2)
PROT SERPL-MCNC: 6.5 G/DL (ref 6–8.5)
PSA SERPL-MCNC: 0.61 NG/ML (ref 0–4)
RBC # BLD AUTO: 4.5 10*6/MM3 (ref 4.14–5.8)
SODIUM BLD-SCNC: 139 MMOL/L (ref 136–145)
WBC NRBC COR # BLD: 5.27 10*3/MM3 (ref 3.4–10.8)

## 2020-06-24 PROCEDURE — 84153 ASSAY OF PSA TOTAL: CPT

## 2020-06-24 PROCEDURE — 36415 COLL VENOUS BLD VENIPUNCTURE: CPT

## 2020-06-24 PROCEDURE — 83735 ASSAY OF MAGNESIUM: CPT

## 2020-06-24 PROCEDURE — 25010000002 DENOSUMAB 60 MG/ML SOLUTION PREFILLED SYRINGE: Performed by: INTERNAL MEDICINE

## 2020-06-24 PROCEDURE — 99214 OFFICE O/P EST MOD 30 MIN: CPT | Performed by: NURSE PRACTITIONER

## 2020-06-24 PROCEDURE — 84100 ASSAY OF PHOSPHORUS: CPT

## 2020-06-24 PROCEDURE — 96372 THER/PROPH/DIAG INJ SC/IM: CPT

## 2020-06-24 PROCEDURE — 85025 COMPLETE CBC W/AUTO DIFF WBC: CPT

## 2020-06-24 PROCEDURE — 80053 COMPREHEN METABOLIC PANEL: CPT

## 2020-06-24 PROCEDURE — 96402 CHEMO HORMON ANTINEOPL SQ/IM: CPT

## 2020-06-24 PROCEDURE — 25010000002 LEUPROLIDE 22.5 MG KIT: Performed by: INTERNAL MEDICINE

## 2020-06-24 RX ADMIN — DENOSUMAB 60 MG: 60 INJECTION SUBCUTANEOUS at 14:20

## 2020-06-24 RX ADMIN — LEUPROLIDE ACETATE 22.5 MG: KIT SUBCUTANEOUS at 14:20

## 2020-06-24 NOTE — PROGRESS NOTES
DATE OF VISIT: 6/24/2020     REASON FOR VISIT:   1. Prostate cancer. Presented with stage I, [id number], PSA at diagnosis 3 status  post prostatectomy in 2000.  2. Relapsed disease with rise in PSA gradually over the last couple of years,  most recent PSA 7.1 ng/mL on 01/25/2016.      HISTORY OF PRESENT ILLNESS: The patient is a very pleasant 80-year-old  gentleman with past medical history significant for prostate cancer status post  radical prostatectomy in 2000. The patient never received adjuvant treatments,  neither radiation, GnRH or antiandrogen treatment. The patient's PSA started  to go up gradually over the last 2 years. Patient was referred to me on  06/13/2014 and I did restaging workup that came back negative including bone  scan, CAT scans chest, abdomen and pelvis. We elected to proceed with watchful  waiting. Patient is here today in scheduled followup visit.     SUBJECTIVE: Mr. Dietz is here today by himself.  He is doing fairly well.  Denies fever, chills, night sweats.  He has been staying at home.  He is scheduled to follow up with urology and neurology.         REVIEW OF SYSTEMS: All the other 9 systems are reviewed by me and negative  except what is mentioned in HPI and subjective.      PAST MEDICAL HISTORY/SOCIAL HISTORY/FAMILY HISTORY: Unchanged from my prior  documentation on 06/13/2014.       Current Outpatient Medications:   •  allopurinol (ZYLOPRIM) 300 MG tablet, TAKE 1 TABLET BY MOUTH ONE TIME A DAY , Disp: 90 tablet, Rfl: 0  •  aspirin 81 MG EC tablet, Take 81 mg by mouth Daily., Disp: , Rfl:   •  CRISTINA CONTOUR TEST test strip, , Disp: , Rfl:   •  Cholecalciferol (VITAMIN D3) 2000 UNITS capsule, Take  by mouth 2 (two) times a day., Disp: , Rfl:   •  Cinnamon 500 MG capsule, Take 2,000 mg by mouth Daily., Disp: , Rfl:   •  fenofibric acid (TRILIPIX) 135 MG capsule delayed-release delayed release capsule, TAKE 1 CAPSULE BY MOUTH ONE TIME A DAY , Disp: 30 capsule, Rfl: 2  •  fluticasone  "(FLONASE) 50 MCG/ACT nasal spray, 2 sprays into the nostril(s) as directed by provider Daily. 2 puffs each nostril, Disp: 1 bottle, Rfl: 0  •  hydroCHLOROthiazide (MICROZIDE PO), hydrochlorothiazide, Disp: , Rfl:   •  HYDROcodone-acetaminophen (NORCO) 5-325 MG per tablet, Take 1 tablet by mouth Every 6 (Six) Hours As Needed for Severe Pain ., Disp: 12 tablet, Rfl: 0  •  JARDIANCE 10 MG tablet, 10 mg Daily., Disp: , Rfl:   •  Lecithin 1200 MG capsule, Take  by mouth Daily., Disp: , Rfl:   •  metFORMIN (GLUCOPHAGE) 500 MG tablet, Take 2 tablets by mouth 2 (Two) Times a Day With Meals., Disp: 120 tablet, Rfl: 2  •  metoprolol succinate XL (TOPROL-XL) 100 MG 24 hr tablet, TAKE 1 TABLET BY MOUTH ONE TIME A DAY , Disp: 30 tablet, Rfl: 0  •  Multiple Vitamins-Minerals (PRESERVISION AREDS 2+MULTI VIT PO), Take  by mouth., Disp: , Rfl:   •  mupirocin (BACTROBAN) 2 % cream, mupirocin calcium 2 % topical cream, Disp: , Rfl:   •  nystatin (MYCOSTATIN) 550325 UNIT/GM powder, Apply  topically to the appropriate area as directed Daily As Needed (rash)., Disp: 60 g, Rfl: 0  •  pravastatin (PRAVACHOL) 40 MG tablet, TAKE ONE TABLET BY MOUTH EVERY NIGHT AT BEDTIME, Disp: 90 tablet, Rfl: 0  •  saccharomyces boulardii (Florastor) 250 MG capsule, Florastor 250 mg capsule, Disp: , Rfl:   •  triamterene-hydrochlorothiazide (DYAZIDE) 37.5-25 MG per capsule, TAKE ONE CAPSULE BY MOUTH DAILY, Disp: 90 capsule, Rfl: 2  •  TRULICITY 0.75 MG/0.5ML solution pen-injector, , Disp: , Rfl:   •  clobetasol (TEMOVATE) 0.05 % ointment, clobetasol 0.05 % topical ointment, Disp: , Rfl:   •  Coenzyme Q10 100 MG tablet, Take 2 tablets by mouth Daily., Disp: , Rfl:   •  methocarbamol (ROBAXIN) 500 MG tablet, Take 1 tablet by mouth 3 (Three) Times a Day As Needed for Muscle Spasms., Disp: 30 tablet, Rfl: 0    PHYSICAL EXAMINATION:   /85   Pulse 86   Temp 97.1 °F (36.2 °C) (Temporal)   Resp 12   Ht 167.6 cm (66\")   Wt 86.2 kg (190 lb)   SpO2 94%   " BMI 30.67 kg/m²   ECOG1  GENERAL: Age appropriate. No acute distress.   HEENT: Head atraumatic, normocephalic.   NECK: Supple. No JVD. No lymphadenopathy.   LUNGS: Clear to auscultation bilaterally. No wheezing. No rhonchi.   HEART: Regular rate and rhythm. S1, S2, no murmurs.   ABDOMEN: Soft, nontender, nondistended. Bowel sounds positive. No  hepatosplenomegaly.   EXTREMITIES: No clubbing, cyanosis, or edema.   SKIN: No rashes. No purpura.   NEUROLOGIC: Awake and oriented x3. Strength 5 out of 5 in all muscle groups.     No visits with results within 2 Week(s) from this visit.   Latest known visit with results is:   Admission on 05/08/2020, Discharged on 05/08/2020   Component Date Value Ref Range Status   • Glucose 05/08/2020 162* 65 - 99 mg/dL Final   • BUN 05/08/2020 12  8 - 23 mg/dL Final   • Creatinine 05/08/2020 0.85  0.76 - 1.27 mg/dL Final   • Sodium 05/08/2020 140  136 - 145 mmol/L Final   • Potassium 05/08/2020 4.7  3.5 - 5.2 mmol/L Final    Specimen hemolyzed.  Results may be affected.   • Chloride 05/08/2020 101  98 - 107 mmol/L Final   • CO2 05/08/2020 25.0  22.0 - 29.0 mmol/L Final   • Calcium 05/08/2020 9.4  8.6 - 10.5 mg/dL Final   • Total Protein 05/08/2020 7.7  6.0 - 8.5 g/dL Final   • Albumin 05/08/2020 4.60  3.50 - 5.20 g/dL Final   • ALT (SGPT) 05/08/2020 36  1 - 41 U/L Final    Specimen hemolyzed.  Results may be affected.   • AST (SGOT) 05/08/2020 40  1 - 40 U/L Final   • Alkaline Phosphatase 05/08/2020 42  39 - 117 U/L Final   • Total Bilirubin 05/08/2020 0.8  0.2 - 1.2 mg/dL Final   • eGFR Non African Amer 05/08/2020 86  >60 mL/min/1.73 Final   • Globulin 05/08/2020 3.1  gm/dL Final   • A/G Ratio 05/08/2020 1.5  g/dL Final   • BUN/Creatinine Ratio 05/08/2020 14.1  7.0 - 25.0 Final   • Anion Gap 05/08/2020 14.0  5.0 - 15.0 mmol/L Final   • Color, UA 05/08/2020 Yellow  Yellow, Straw Final   • Appearance, UA 05/08/2020 Clear  Clear Final   • pH, UA 05/08/2020 5.5  5.0 - 8.0 Final   • Specific  Gravity, UA 05/08/2020 1.020  1.005 - 1.030 Final   • Glucose, UA 05/08/2020 >=1000 mg/dL (3+)* Negative Final   • Ketones, UA 05/08/2020 15 mg/dL (1+)* Negative Final   • Bilirubin, UA 05/08/2020 Negative  Negative Final   • Blood, UA 05/08/2020 Negative  Negative Final   • Protein, UA 05/08/2020 Negative  Negative Final   • Leuk Esterase, UA 05/08/2020 Negative  Negative Final   • Nitrite, UA 05/08/2020 Negative  Negative Final   • Urobilinogen, UA 05/08/2020 0.2 E.U./dL  0.2 - 1.0 E.U./dL Final   • WBC 05/08/2020 8.43  3.40 - 10.80 10*3/mm3 Final   • RBC 05/08/2020 4.95  4.14 - 5.80 10*6/mm3 Final   • Hemoglobin 05/08/2020 14.5  13.0 - 17.7 g/dL Final   • Hematocrit 05/08/2020 44.3  37.5 - 51.0 % Final   • MCV 05/08/2020 89.5  79.0 - 97.0 fL Final   • MCH 05/08/2020 29.3  26.6 - 33.0 pg Final   • MCHC 05/08/2020 32.7  31.5 - 35.7 g/dL Final   • RDW 05/08/2020 13.2  12.3 - 15.4 % Final   • RDW-SD 05/08/2020 42.9  37.0 - 54.0 fl Final   • MPV 05/08/2020 9.9  6.0 - 12.0 fL Final   • Platelets 05/08/2020 184  140 - 450 10*3/mm3 Final   • Neutrophil % 05/08/2020 66.3  42.7 - 76.0 % Final   • Lymphocyte % 05/08/2020 22.5  19.6 - 45.3 % Final   • Monocyte % 05/08/2020 6.8  5.0 - 12.0 % Final   • Eosinophil % 05/08/2020 2.6  0.3 - 6.2 % Final   • Basophil % 05/08/2020 1.1  0.0 - 1.5 % Final   • Immature Grans % 05/08/2020 0.7* 0.0 - 0.5 % Final   • Neutrophils, Absolute 05/08/2020 5.59  1.70 - 7.00 10*3/mm3 Final   • Lymphocytes, Absolute 05/08/2020 1.90  0.70 - 3.10 10*3/mm3 Final   • Monocytes, Absolute 05/08/2020 0.57  0.10 - 0.90 10*3/mm3 Final   • Eosinophils, Absolute 05/08/2020 0.22  0.00 - 0.40 10*3/mm3 Final   • Basophils, Absolute 05/08/2020 0.09  0.00 - 0.20 10*3/mm3 Final   • Immature Grans, Absolute 05/08/2020 0.06* 0.00 - 0.05 10*3/mm3 Final   • nRBC 05/08/2020 0.0  0.0 - 0.2 /100 WBC Final        ASSESSMENT: The patient is a very pleasant 80-year-old gentleman with relapsed  prostate cancer.     PROBLEM  LIST:  1. Prostate cancer, initially presented as [id number] status post radical  prostatectomy 2000.  2. Rise in PSA gradually over the last 2 years,   3. Multiple comorbidities including type 2 diabetes, hypertension, morbid  obesity, osteoarthritis, and hypercholesterolemia.  4. Erectile dysfunction.   5.  Family history of colon cancer  6.  Osteopenia  7.  Renal cyst    PLAN:  1. I did go over the blood work results in detail with the patient. I explained to him that  his last PSA on March 18, 2020 was 0.511.   I will continue Eligard 22.5mg q3 month.   2. The patient will return to clinic in 3 months with repeat blood work as well as serum PSA.   3. I will continue Viagra as needed for erectile dysfunction.   4.  He will continue with [place] and Trulicity subcu for type 2 diabetes.  5.  We'll continue pravastatin for hypercholesterolemia  6.  Patient need to have 5 year follow-up colonoscopy which would be due 11/2021.  7. We reviewed the potential side effects of the treatment including hot flashes, impotence, joint pain, decrease in bone density, mood or sleep disturbance, and asthenia.  8.  I'll continue the patient on Prolia 60 mg subcutaneous every 6 month.  His next dose will be due today June 2020.  We'll continue calcium with vitamin D daily.  9.  Patient will keep scheduled follow up to see Dr. Jaimes for right renal cyst.  10. He will keep scheduled follow up with neurology.     Olimpia Real, HIPOLITO    6/24/2020

## 2020-07-07 RX ORDER — METOPROLOL SUCCINATE 100 MG/1
TABLET, EXTENDED RELEASE ORAL
Qty: 30 TABLET | Refills: 0 | Status: SHIPPED | OUTPATIENT
Start: 2020-07-07 | End: 2020-09-14

## 2020-08-26 RX ORDER — L-METHYLFOLATE-ALGAE-VIT B12-B6 CAP 3-90.314-2-35 MG 3-90.314-2-35 MG
CAP ORAL
Qty: 180 CAPSULE | Status: CANCELLED | OUTPATIENT
Start: 2020-08-26

## 2020-08-26 RX ORDER — L-METHYLFOLATE-ALGAE-VIT B12-B6 CAP 3-90.314-2-35 MG 3-90.314-2-35 MG
CAP ORAL
Qty: 180 CAPSULE | Refills: 1 | Status: SHIPPED | OUTPATIENT
Start: 2020-08-26 | End: 2022-06-13

## 2020-08-27 ENCOUNTER — APPOINTMENT (OUTPATIENT)
Dept: GENERAL RADIOLOGY | Facility: HOSPITAL | Age: 85
End: 2020-08-27

## 2020-08-27 ENCOUNTER — HOSPITAL ENCOUNTER (EMERGENCY)
Facility: HOSPITAL | Age: 85
Discharge: HOME | End: 2020-08-27
Attending: EMERGENCY MEDICINE | Admitting: EMERGENCY MEDICINE

## 2020-08-27 VITALS
DIASTOLIC BLOOD PRESSURE: 98 MMHG | SYSTOLIC BLOOD PRESSURE: 172 MMHG | HEIGHT: 66 IN | OXYGEN SATURATION: 96 % | WEIGHT: 200 LBS | RESPIRATION RATE: 18 BRPM | BODY MASS INDEX: 32.14 KG/M2 | HEART RATE: 98 BPM | TEMPERATURE: 98.6 F

## 2020-08-27 DIAGNOSIS — M25.562 ACUTE PAIN OF LEFT KNEE: Primary | ICD-10-CM

## 2020-08-27 PROCEDURE — 99283 EMERGENCY DEPT VISIT LOW MDM: CPT

## 2020-08-27 PROCEDURE — 73562 X-RAY EXAM OF KNEE 3: CPT

## 2020-08-27 RX ORDER — HYDROCODONE BITARTRATE AND ACETAMINOPHEN 5; 325 MG/1; MG/1
1 TABLET ORAL EVERY 6 HOURS PRN
Qty: 12 TABLET | Refills: 0 | Status: SHIPPED | OUTPATIENT
Start: 2020-08-27 | End: 2020-10-14

## 2020-08-27 RX ORDER — HYDROCODONE BITARTRATE AND ACETAMINOPHEN 5; 325 MG/1; MG/1
1 TABLET ORAL ONCE
Status: COMPLETED | OUTPATIENT
Start: 2020-08-27 | End: 2020-08-27

## 2020-08-27 RX ADMIN — HYDROCODONE BITARTRATE AND ACETAMINOPHEN 1 TABLET: 5; 325 TABLET ORAL at 21:29

## 2020-08-28 NOTE — ED PROVIDER NOTES
Subjective   History of Present Illness  This 85-year-old gentleman who comes in today complaining of left knee pain.  He reports he was getting ready to go out to dinner when he stood up and started feeling pain to his left knee.  He denies falling and denies any other injuries.  He has pain when he bears weight.  Review of Systems   Constitutional: Negative.    HENT: Negative.    Eyes: Negative.    Respiratory: Negative.    Cardiovascular: Negative.    Gastrointestinal: Negative.    Genitourinary: Negative.    Musculoskeletal: Positive for arthralgias.   Skin: Negative.    Neurological: Negative.    Psychiatric/Behavioral: Negative.        Past Medical History:   Diagnosis Date   • Aortic stenosis     status post ROSS procedure, remote, with December 2015 echocardiography revealing normal aortic and pulmonary valve function, normal ejection fraction and mild to moderate MR   • Arthritis    • Depression    • Diabetes mellitus (CMS/HCC)    • Diverticulitis    • Exogenous obesity    • Gout    • Heart murmur    • History of vitamin D deficiency    • Hyperlipidemia    • Hypertension    • Malignant neoplasm of prostate (CMS/HCC)    • Sleep apnea    • Vertigo        Allergies   Allergen Reactions   • Ampicillin Anaphylaxis   • Medrol [Methylprednisolone] Unknown - High Severity     Made his blood sugar get really high, can't take this   • Myrbetriq [Mirabegron] Unknown - High Severity     High BP   • Penicillins Anaphylaxis   • Bee Venom Swelling       Past Surgical History:   Procedure Laterality Date   • CARDIAC VALVE REPLACEMENT     • COLON SURGERY     • COLONOSCOPY     • COLOSTOMY  1999   • COLOSTOMY REVISION     • EXPLORATORY LAPAROTOMY      With Tissue Removal   • LIPOMA EXCISION     • MOHS SURGERY     • OTHER SURGICAL HISTORY      Porcine Valve   • PROSTATE SURGERY     • PROSTATECTOMY  2000     History of Prostatectomy Perineal Radical   • SKIN CANCER EXCISION      from head and lip   • TONSILLECTOMY          Family History   Problem Relation Age of Onset   • Diabetes Mother    • Lung cancer Mother    • Cancer Mother    • Heart disease Father    • Breast cancer Other    • Cancer Other    • Hypertension Other    • Migraines Other    • Obesity Other    • Diabetes Other        Social History     Socioeconomic History   • Marital status: Single     Spouse name: Not on file   • Number of children: Not on file   • Years of education: Not on file   • Highest education level: Not on file   Tobacco Use   • Smoking status: Never Smoker   • Smokeless tobacco: Never Used   Substance and Sexual Activity   • Alcohol use: No   • Drug use: No   • Sexual activity: Defer           Objective   Physical Exam   Constitutional: He appears well-developed and well-nourished.   Nursing note and vitals reviewed.  GEN: No acute distress  Head: Normocephalic, atraumatic  Eyes: Pupils equal round reactive to light  ENT: Posterior pharynx normal in appearance, oral mucosa is moist  Chest: Nontender to palpation  Cardiovascular: Regular rate  Lungs: Clear to auscultation bilaterally  Abdomen: Soft, nontender, nondistended, no peritoneal signs  Extremities: Tender left knee with effusion noted.  Limited range of motion due to pain.   Neuro: GCS 15  Psych: Mood and affect are appropriate      Procedures           ED Course                                           MDM  Number of Diagnoses or Management Options     Amount and/or Complexity of Data Reviewed  Tests in the radiology section of CPT®: ordered and reviewed  Review and summarize past medical records: yes  Discuss the patient with other providers: yes  Independent visualization of images, tracings, or specimens: yes    Risk of Complications, Morbidity, and/or Mortality  Presenting problems: low  Diagnostic procedures: low  Management options: low        Final diagnoses:   Acute pain of left knee            Benita Bruner, APRN  08/27/20 0845

## 2020-09-01 ENCOUNTER — PATIENT OUTREACH (OUTPATIENT)
Dept: CASE MANAGEMENT | Facility: OTHER | Age: 85
End: 2020-09-01

## 2020-09-01 ENCOUNTER — EPISODE CHANGES (OUTPATIENT)
Dept: CASE MANAGEMENT | Facility: OTHER | Age: 85
End: 2020-09-01

## 2020-09-01 NOTE — OUTREACH NOTE
Care Coordination Assessment    Documented/Reviewed By:  Ginny Montaño RN Date/time:  9/1/2020  2:38 PM   Assessment completed with:  patient  Living arrangement:  alone  Support system:  children (Comment: daughter)  Type of residence:  private residence  Equipment used at home:  cane, walker (Comment: CPAP   bp monitor   blood glucose monitor  )  Bed or wheelchair confined:  No  Inadequate nutrition:  No  Medication adherence problem:  Yes  Experiencing side effects from current medications:  No  History of fall(s) in last 6 months:  No  Difficulty keeping appointments:  No  Family aware of the patient's advance care planning wishes:  Yes (Comment: States Has Living Will )

## 2020-09-01 NOTE — OUTREACH NOTE
Care Plan Note      Responses   Lifestyle Goals  Medication management, Self monitor blood pressure, Self monitor blood sugar, Routine follow-up with doctor(s), Decrease falls risk   Self Management  Home BP Monitoring, Home Glucose Monitoring, Get flu/pneumonia shot, Dietary Changes - Eat More Fruits/Vegetables   Annual Wellness Visit:   Patient Will Schedule   Care Gaps Addressed  Flu Shot, Pneumonia Vaccine   Flu Shot Status  Up to Date   Pneumonia Vaccine Status  Up to Date   Specific Disease Process Teaching  Diabetes, Hypertension   Other Patient Education/Resources   24/[facility] Nurse Call Line, MyChart   24/7 Nurse Call Line Education Method  Verbal   MyChart Education Method  Verbal   Does patient have depression diagnosis?  No   Advanced Directives:  Patient Has   Ed Visits past 12 months:  3 or more   Hospitalizations past 12 months  None   Medication Adherence  Medications understood   Goal Progress  Making Progress Toward Goal(s)        The main concerns and/or symptoms the patient would like to address are: Pt contacted regarding ED visit 8/27/2020 with chief c/o acute knee pain. States he has only required one pain pill since home and does have appt with ortho office 9/3/2020. States he is up and about and does have cane and walker, but only uses when needed.  He says he is apprehensive about surgery on knee and not sure why, since has had multiple surgeries including colostomy and colostomy take down and cardiac valve replacement.  He is self-sufficient and self managed, but states has good support from daughter.  He states he does have a problem with med adherence and is trying to do better and take medicines as directed.  He also states he does not check blood sugar as regularly as he should, but will also try to do better with this and log for Dr. Wagner.  States averages around 140.  He did say that his bp was up in ED and he will monitor and log this.  He uses CPAP at night.  He  reports that he could also do better with diet.  Does eat vegetables, but says could eat more and decrease potatoes.   Discussed Covid 19 precautions including social distancing, good handwashing, disinfecting hard surfaces and wearing mask when out.  Verbalized understanding and states he does follow guidelines. He is due Medicare AWV and gave permission to add appt notation regarding this.  He denies any needs at present, but voiced his appreciation for the call and states he had excellent care in the ER.      Education/instruction provided by Care Coordinator: Role of  explained and contact number given.  [facility] 24[facility] Nurse line explained and contact number provided.  [employer]  number provided and explained. Discussed DM, A1C, diet, blood glucose and bp  monitoring    Follow Up Outreach Due: 6-8 weeks or as needed     Ginny Montaño RN  Ambulatory     9/1/2020, 14:44

## 2020-09-03 ENCOUNTER — OFFICE VISIT (OUTPATIENT)
Dept: ORTHOPEDIC SURGERY | Facility: CLINIC | Age: 85
End: 2020-09-03

## 2020-09-03 VITALS — BODY MASS INDEX: 31.27 KG/M2 | TEMPERATURE: 97.3 F | RESPIRATION RATE: 18 BRPM | WEIGHT: 194.6 LBS | HEIGHT: 66 IN

## 2020-09-03 DIAGNOSIS — M25.562 ARTHRALGIA OF KNEE, LEFT: Primary | ICD-10-CM

## 2020-09-03 DIAGNOSIS — M17.12 PRIMARY OSTEOARTHRITIS OF LEFT KNEE: ICD-10-CM

## 2020-09-03 PROCEDURE — 20610 DRAIN/INJ JOINT/BURSA W/O US: CPT | Performed by: PHYSICIAN ASSISTANT

## 2020-09-03 PROCEDURE — 99203 OFFICE O/P NEW LOW 30 MIN: CPT | Performed by: PHYSICIAN ASSISTANT

## 2020-09-03 RX ORDER — LIDOCAINE HYDROCHLORIDE 10 MG/ML
2 INJECTION, SOLUTION INFILTRATION; PERINEURAL
Status: COMPLETED | OUTPATIENT
Start: 2020-09-03 | End: 2020-09-03

## 2020-09-03 RX ORDER — METHYLPREDNISOLONE ACETATE 40 MG/ML
40 INJECTION, SUSPENSION INTRA-ARTICULAR; INTRALESIONAL; INTRAMUSCULAR; SOFT TISSUE
Status: COMPLETED | OUTPATIENT
Start: 2020-09-03 | End: 2020-09-03

## 2020-09-03 RX ADMIN — LIDOCAINE HYDROCHLORIDE 2 ML: 10 INJECTION, SOLUTION INFILTRATION; PERINEURAL at 09:44

## 2020-09-03 RX ADMIN — METHYLPREDNISOLONE ACETATE 40 MG: 40 INJECTION, SUSPENSION INTRA-ARTICULAR; INTRALESIONAL; INTRAMUSCULAR; SOFT TISSUE at 09:44

## 2020-09-03 NOTE — PROGRESS NOTES
Subjective   Patient ID: Blane Dietz is a 85 y.o.  male  Pain of the Left Knee (Patient here today at the request of [facility] ER for left knee pain. He went to the ER on 8/27/20. He states the pain started 8/26/20, sitting on edge of bed for a long time stood up & had severe pain. Same pain again the next day. )         History of Present Illness   Patient presents as a new patient with complaints of left knee pain.  He denies injury or trauma.  The pain began abruptly on 8/26/2020.  No injury or trauma.  He states today, he is not having any pain.  He has been using a knee brace that was provided from the emergency room.  He did have x-rays at the [facility] ER which was negative for acute osseous abnormality  He denies redness or warmth to the knee.  There is no mechanical locking or instability of the left knee  He does mention that he injured the left knee in the 1950s which required extensive therapy but no surgery    Pain Score: worst possible pain(at its worst)  Pain Location: Knee  Pain Orientation: Left     Pain Descriptors: Sharp  Pain Frequency: Intermittent     Date Pain First Started: 08/27/20     Aggravating Factors: (sitting too long and going to standing)        Pain Intervention(s): Home medication(Norco 5, brace)  Result of Injury: No       Past Medical History:   Diagnosis Date   • Aortic stenosis     status post ROSS procedure, remote, with December 2015 echocardiography revealing normal aortic and pulmonary valve function, normal ejection fraction and mild to moderate MR   • Arthritis    • Depression    • Diabetes mellitus (CMS/HCC)    • Diverticulitis    • Exogenous obesity    • Gout    • Heart murmur    • History of vitamin D deficiency    • Hyperlipidemia    • Hypertension    • Malignant neoplasm of prostate (CMS/HCC)    • Sleep apnea    • Vertigo         Past Surgical History:   Procedure Laterality Date   • CARDIAC VALVE REPLACEMENT     • COLON SURGERY     • COLONOSCOPY     • COLOSTOMY   1999   • COLOSTOMY REVISION     • EXPLORATORY LAPAROTOMY      With Tissue Removal   • LIPOMA EXCISION     • MOHS SURGERY     • OTHER SURGICAL HISTORY      Porcine Valve   • PROSTATE SURGERY     • PROSTATECTOMY  2000     History of Prostatectomy Perineal Radical   • SKIN CANCER EXCISION      from head and lip   • TONSILLECTOMY         Family History   Problem Relation Age of Onset   • Diabetes Mother    • Lung cancer Mother    • Cancer Mother    • Heart disease Father    • Breast cancer Other    • Cancer Other    • Hypertension Other    • Migraines Other    • Obesity Other    • Diabetes Other        Social History     Socioeconomic History   • Marital status: Single     Spouse name: Not on file   • Number of children: Not on file   • Years of education: Not on file   • Highest education level: Not on file   Occupational History   • Occupation: retired from [employer]   Tobacco Use   • Smoking status: Never Smoker   • Smokeless tobacco: Never Used   Substance and Sexual Activity   • Alcohol use: No   • Drug use: No   • Sexual activity: Defer   Social History Narrative    Right hand dominant         Current Outpatient Medications:   •  allopurinol (ZYLOPRIM) 300 MG tablet, TAKE 1 TABLET BY MOUTH ONE TIME A DAY , Disp: 90 tablet, Rfl: 0  •  aspirin 81 MG EC tablet, Take 81 mg by mouth Daily., Disp: , Rfl:   •  CRISTINA CONTOUR TEST test strip, , Disp: , Rfl:   •  Cholecalciferol (VITAMIN D3) 2000 UNITS capsule, Take  by mouth 2 (two) times a day., Disp: , Rfl:   •  Cinnamon 500 MG capsule, Take 2,000 mg by mouth Daily., Disp: , Rfl:   •  Coenzyme Q10 100 MG tablet, Take 2 tablets by mouth Daily., Disp: , Rfl:   •  fenofibric acid (TRILIPIX) 135 MG capsule delayed-release delayed release capsule, TAKE 1 CAPSULE BY MOUTH ONE TIME A DAY , Disp: 30 capsule, Rfl: 2  •  fluticasone (FLONASE) 50 MCG/ACT nasal spray, 2 sprays into the nostril(s) as directed by provider Daily. 2 puffs each nostril, Disp: 1 bottle,  Rfl: 0  •  hydroCHLOROthiazide (MICROZIDE PO), hydrochlorothiazide, Disp: , Rfl:   •  HYDROcodone-acetaminophen (NORCO) 5-325 MG per tablet, Take 1 tablet by mouth Every 6 (Six) Hours As Needed for Severe Pain ., Disp: 12 tablet, Rfl: 0  •  JARDIANCE 10 MG tablet, 10 mg Daily., Disp: , Rfl:   •  l-methylfolate-algae-B6-B12 (METANX) 3-90.314-2-35 MG capsule capsule, TAKE 1 CAPSULE BY MOUTH TWICE A DAY., Disp: 180 capsule, Rfl: 1  •  Lecithin 1200 MG capsule, Take  by mouth Daily., Disp: , Rfl:   •  metFORMIN (GLUCOPHAGE) 500 MG tablet, Take 2 tablets by mouth 2 (Two) Times a Day With Meals., Disp: 120 tablet, Rfl: 2  •  methocarbamol (ROBAXIN) 500 MG tablet, Take 1 tablet by mouth 3 (Three) Times a Day As Needed for Muscle Spasms., Disp: 30 tablet, Rfl: 0  •  metoprolol succinate XL (TOPROL-XL) 100 MG 24 hr tablet, TAKE 1 TABLET BY MOUTH ONE TIME A DAY , Disp: 30 tablet, Rfl: 0  •  Multiple Vitamins-Minerals (PRESERVISION AREDS 2+MULTI VIT PO), Take  by mouth., Disp: , Rfl:   •  mupirocin (BACTROBAN) 2 % cream, mupirocin calcium 2 % topical cream, Disp: , Rfl:   •  nystatin (MYCOSTATIN) 066561 UNIT/GM powder, Apply  topically to the appropriate area as directed Daily As Needed (rash)., Disp: 60 g, Rfl: 0  •  pravastatin (PRAVACHOL) 40 MG tablet, TAKE ONE TABLET BY MOUTH EVERY NIGHT AT BEDTIME, Disp: 90 tablet, Rfl: 0  •  saccharomyces boulardii (Florastor) 250 MG capsule, Florastor 250 mg capsule, Disp: , Rfl:   •  triamterene-hydrochlorothiazide (DYAZIDE) 37.5-25 MG per capsule, TAKE ONE CAPSULE BY MOUTH DAILY, Disp: 90 capsule, Rfl: 2  •  TRULICITY 0.75 MG/0.5ML solution pen-injector, , Disp: , Rfl:     Allergies   Allergen Reactions   • Ampicillin Anaphylaxis   • Medrol [Methylprednisolone] Unknown - High Severity     Made his blood sugar get really high, can't take this   • Myrbetriq [Mirabegron] Unknown - High Severity     High BP   • Penicillins Anaphylaxis   • Bee Venom Swelling       Review of Systems  "  Constitutional: Negative for fever.   HENT: Negative for dental problem and voice change.    Eyes: Negative for visual disturbance.   Respiratory: Negative for shortness of breath.    Cardiovascular: Negative for chest pain.   Gastrointestinal: Negative for abdominal pain.   Genitourinary: Negative for dysuria.   Musculoskeletal: Positive for arthralgias, gait problem ( uses cane for stability since pain last week) and joint swelling.   Skin: Negative for rash.   Neurological: Negative for speech difficulty.   Hematological: Does not bruise/bleed easily.   Psychiatric/Behavioral: Negative for confusion.       I have reviewed the medical and surgical history, family history, social history, medications, and/or allergies, and the review of systems of this report.    Objective   Temp 97.3 °F (36.3 °C)   Resp 18   Ht 167.6 cm (66\")   Wt 88.3 kg (194 lb 9.6 oz)   BMI 31.41 kg/m²    Physical Exam   Constitutional: He is oriented to person, place, and time. He appears well-developed and well-nourished.   Pulmonary/Chest: Effort normal.   Musculoskeletal:        Left knee: He exhibits no effusion, no ecchymosis, no erythema, normal alignment and normal meniscus. Tenderness found. Medial joint line, lateral joint line and MCL tenderness noted.   Neurological: He is alert and oriented to person, place, and time.   Psychiatric: He has a normal mood and affect.   Nursing note and vitals reviewed.    Left Knee Exam     Range of Motion   Extension: 5   Flexion: 100     Tests   Summer:  Medial - negative Lateral - negative  Drawer:  Anterior - negative     Posterior - negative    Other   Erythema: absent  Sensation: normal  Effusion: no effusion present           Extremity DVT signs are negative on physical exam no calf pain   Neurologic Exam     Mental Status   Oriented to person, place, and time.            Assessment/Plan   Independent Review of Radiographic Studies:    X-ray of the left knee 2 view weightbearing " performed in the office for the evaluation of knee pain.  No comparison films available to review.  There is medial joint space narrowing with microcalcifications to the distal medial femoral condyle which goes along with his history of knee injury in the 1950s      Large Joint Arthrocentesis: L knee  Date/Time: 9/3/2020 9:44 AM  Consent given by: patient  Site marked: site marked  Timeout: Immediately prior to procedure a time out was called to verify the correct patient, procedure, equipment, support staff and site/side marked as required   Supporting Documentation  Indications: pain   Procedure Details  Location: knee - L knee  Preparation: Patient was prepped and draped in the usual sterile fashion  Needle size: 22 G  Approach: anterolateral  Medications administered: 2 mL lidocaine 1 %; 40 mg methylPREDNISolone acetate 40 MG/ML  Patient tolerance: patient tolerated the procedure well with no immediate complications             Blane was seen today for pain.    Diagnoses and all orders for this visit:    Arthralgia of knee, left  -     XR Knee 1 or 2 View Left    Primary osteoarthritis of left knee  -     Large Joint Arthrocentesis: L knee       Orthopedic activities reviewed and patient expressed appreciation  Discussion of orthopedic goals  Risk, benefits, and merits of treatment alternatives reviewed with the patient and questions answered  Call or notify for any adverse effect from injection therapy    Recommendations/Plan:  Exercise, medications, injections, other patient advice, and return appointment as noted.  Patient is encouraged to call or return for any issues or concerns.    Follow-up in 3 months  Use topical Aspercreme and ice  I had a lengthy discussion with the patient about the effects of intra-articular cortisone injection.  His allergy note list he cannot tolerate Medrol Dosepak due to elevated glucose.  However, he states he will monitor his glucose closely and limit sugar and carbohydrate  intake for the next 10 days.  He is aware of the intra-articular cortisone can increase blood sugar but would like to proceed with the injection  Patient agreeable to call or return sooner for any concerns.             EMR Dragon-transcription disclaimer:  This encounter note is an electronic transcription of spoken language to printed text.  Electronic transcription of spoken language may permit erroneous or at times nonsensical words or phrases to be inadvertently transcribed.  Although I have reviewed the note for such errors, some may still exist

## 2020-09-14 RX ORDER — METOPROLOL SUCCINATE 100 MG/1
TABLET, EXTENDED RELEASE ORAL
Qty: 90 TABLET | Refills: 0 | Status: SHIPPED | OUTPATIENT
Start: 2020-09-14 | End: 2020-11-16

## 2020-09-22 DIAGNOSIS — C61 PROSTATE CANCER (HCC): Primary | ICD-10-CM

## 2020-09-23 ENCOUNTER — OFFICE VISIT (OUTPATIENT)
Dept: ONCOLOGY | Facility: CLINIC | Age: 85
End: 2020-09-23

## 2020-09-23 ENCOUNTER — INFUSION (OUTPATIENT)
Dept: ONCOLOGY | Facility: HOSPITAL | Age: 85
End: 2020-09-23

## 2020-09-23 VITALS
HEART RATE: 77 BPM | HEIGHT: 66 IN | DIASTOLIC BLOOD PRESSURE: 86 MMHG | BODY MASS INDEX: 30.53 KG/M2 | RESPIRATION RATE: 16 BRPM | OXYGEN SATURATION: 97 % | TEMPERATURE: 97.1 F | WEIGHT: 190 LBS | SYSTOLIC BLOOD PRESSURE: 151 MMHG

## 2020-09-23 DIAGNOSIS — C61 PROSTATE CANCER (HCC): Primary | ICD-10-CM

## 2020-09-23 LAB
ALBUMIN SERPL-MCNC: 4.4 G/DL (ref 3.5–5.2)
ALBUMIN/GLOB SERPL: 1.7 G/DL
ALP SERPL-CCNC: 49 U/L (ref 39–117)
ALT SERPL W P-5'-P-CCNC: 27 U/L (ref 1–41)
ANION GAP SERPL CALCULATED.3IONS-SCNC: 13 MMOL/L (ref 5–15)
AST SERPL-CCNC: 23 U/L (ref 1–40)
BASOPHILS # BLD AUTO: 0.12 10*3/MM3 (ref 0–0.2)
BASOPHILS NFR BLD AUTO: 1.8 % (ref 0–1.5)
BILIRUB SERPL-MCNC: 0.6 MG/DL (ref 0–1.2)
BUN SERPL-MCNC: 22 MG/DL (ref 8–23)
BUN/CREAT SERPL: 23.9 (ref 7–25)
CALCIUM SPEC-SCNC: 9.3 MG/DL (ref 8.6–10.5)
CHLORIDE SERPL-SCNC: 98 MMOL/L (ref 98–107)
CO2 SERPL-SCNC: 27 MMOL/L (ref 22–29)
CREAT SERPL-MCNC: 0.92 MG/DL (ref 0.76–1.27)
DEPRECATED RDW RBC AUTO: 44.6 FL (ref 37–54)
EOSINOPHIL # BLD AUTO: 0.23 10*3/MM3 (ref 0–0.4)
EOSINOPHIL NFR BLD AUTO: 3.4 % (ref 0.3–6.2)
ERYTHROCYTE [DISTWIDTH] IN BLOOD BY AUTOMATED COUNT: 13.5 % (ref 12.3–15.4)
GFR SERPL CREATININE-BSD FRML MDRD: 79 ML/MIN/1.73
GLOBULIN UR ELPH-MCNC: 2.6 GM/DL
GLUCOSE SERPL-MCNC: 331 MG/DL (ref 65–99)
HCT VFR BLD AUTO: 44.2 % (ref 37.5–51)
HGB BLD-MCNC: 14.8 G/DL (ref 13–17.7)
IMM GRANULOCYTES # BLD AUTO: 0.08 10*3/MM3 (ref 0–0.05)
IMM GRANULOCYTES NFR BLD AUTO: 1.2 % (ref 0–0.5)
LYMPHOCYTES # BLD AUTO: 1.73 10*3/MM3 (ref 0.7–3.1)
LYMPHOCYTES NFR BLD AUTO: 25.4 % (ref 19.6–45.3)
MCH RBC QN AUTO: 30.4 PG (ref 26.6–33)
MCHC RBC AUTO-ENTMCNC: 33.5 G/DL (ref 31.5–35.7)
MCV RBC AUTO: 90.8 FL (ref 79–97)
MONOCYTES # BLD AUTO: 0.52 10*3/MM3 (ref 0.1–0.9)
MONOCYTES NFR BLD AUTO: 7.6 % (ref 5–12)
NEUTROPHILS NFR BLD AUTO: 4.13 10*3/MM3 (ref 1.7–7)
NEUTROPHILS NFR BLD AUTO: 60.6 % (ref 42.7–76)
NRBC BLD AUTO-RTO: 0 /100 WBC (ref 0–0.2)
PLATELET # BLD AUTO: 163 10*3/MM3 (ref 140–450)
PMV BLD AUTO: 9.7 FL (ref 6–12)
POTASSIUM SERPL-SCNC: 4.1 MMOL/L (ref 3.5–5.2)
PROT SERPL-MCNC: 7 G/DL (ref 6–8.5)
PSA SERPL-MCNC: 0.78 NG/ML (ref 0–4)
RBC # BLD AUTO: 4.87 10*6/MM3 (ref 4.14–5.8)
SODIUM SERPL-SCNC: 138 MMOL/L (ref 136–145)
WBC # BLD AUTO: 6.81 10*3/MM3 (ref 3.4–10.8)

## 2020-09-23 PROCEDURE — 80053 COMPREHEN METABOLIC PANEL: CPT

## 2020-09-23 PROCEDURE — 36415 COLL VENOUS BLD VENIPUNCTURE: CPT

## 2020-09-23 PROCEDURE — 99214 OFFICE O/P EST MOD 30 MIN: CPT | Performed by: INTERNAL MEDICINE

## 2020-09-23 PROCEDURE — 25010000002 LEUPROLIDE 22.5 MG KIT: Performed by: NURSE PRACTITIONER

## 2020-09-23 PROCEDURE — 84153 ASSAY OF PSA TOTAL: CPT

## 2020-09-23 PROCEDURE — 85025 COMPLETE CBC W/AUTO DIFF WBC: CPT

## 2020-09-23 PROCEDURE — 96402 CHEMO HORMON ANTINEOPL SQ/IM: CPT

## 2020-09-23 RX ADMIN — LEUPROLIDE ACETATE 22.5 MG: KIT SUBCUTANEOUS at 10:44

## 2020-09-23 NOTE — PROGRESS NOTES
DATE OF VISIT: 9/23/2020     REASON FOR VISIT:   1. Prostate cancer. Presented with stage I, [id number], PSA at diagnosis 3 status  post prostatectomy in 2000.  2. Relapsed disease with rise in PSA gradually over the last couple of years,  most recent PSA 7.1 ng/mL on 01/25/2016.      HISTORY OF PRESENT ILLNESS: The patient is a very pleasant 80-year-old  gentleman with past medical history significant for prostate cancer status post  radical prostatectomy in 2000. The patient never received adjuvant treatments,  neither radiation, GnRH or antiandrogen treatment. The patient's PSA started  to go up gradually over the last 2 years. Patient was referred to me on  06/13/2014 and I did restaging workup that came back negative including bone  scan, CAT scans chest, abdomen and pelvis. We elected to proceed with watchful  waiting. Patient is here today in scheduled followup visit.     SUBJECTIVE: Mr. Dietz is here today by himself.  He has been complaining of arthralgia.  He had steroid shot in his left knee.  He denied any fever chills or night sweats.      REVIEW OF SYSTEMS: All the other 9 systems are reviewed by me and negative  except what is mentioned in HPI and subjective.      PAST MEDICAL HISTORY/SOCIAL HISTORY/FAMILY HISTORY: Unchanged from my prior  documentation on 06/13/2014.       Current Outpatient Medications:   •  allopurinol (ZYLOPRIM) 300 MG tablet, TAKE 1 TABLET BY MOUTH ONE TIME A DAY , Disp: 90 tablet, Rfl: 0  •  aspirin 81 MG EC tablet, Take 81 mg by mouth Daily., Disp: , Rfl:   •  CRISTINA CONTOUR TEST test strip, , Disp: , Rfl:   •  Cholecalciferol (VITAMIN D3) 2000 UNITS capsule, Take  by mouth 2 (two) times a day., Disp: , Rfl:   •  Cinnamon 500 MG capsule, Take 2,000 mg by mouth Daily., Disp: , Rfl:   •  Coenzyme Q10 100 MG tablet, Take 2 tablets by mouth Daily., Disp: , Rfl:   •  fenofibric acid (TRILIPIX) 135 MG capsule delayed-release delayed release capsule, TAKE 1 CAPSULE BY MOUTH ONE TIME A DAY  , Disp: 30 capsule, Rfl: 2  •  fluticasone (FLONASE) 50 MCG/ACT nasal spray, 2 sprays into the nostril(s) as directed by provider Daily. 2 puffs each nostril, Disp: 1 bottle, Rfl: 0  •  hydroCHLOROthiazide (MICROZIDE PO), hydrochlorothiazide, Disp: , Rfl:   •  HYDROcodone-acetaminophen (NORCO) 5-325 MG per tablet, Take 1 tablet by mouth Every 6 (Six) Hours As Needed for Severe Pain ., Disp: 12 tablet, Rfl: 0  •  JARDIANCE 10 MG tablet, 10 mg Daily., Disp: , Rfl:   •  l-methylfolate-algae-B6-B12 (METANX) 3-90.314-2-35 MG capsule capsule, TAKE 1 CAPSULE BY MOUTH TWICE A DAY., Disp: 180 capsule, Rfl: 1  •  Lecithin 1200 MG capsule, Take  by mouth Daily., Disp: , Rfl:   •  metFORMIN (GLUCOPHAGE) 500 MG tablet, Take 2 tablets by mouth 2 (Two) Times a Day With Meals., Disp: 120 tablet, Rfl: 2  •  methocarbamol (ROBAXIN) 500 MG tablet, Take 1 tablet by mouth 3 (Three) Times a Day As Needed for Muscle Spasms., Disp: 30 tablet, Rfl: 0  •  metoprolol succinate XL (TOPROL-XL) 100 MG 24 hr tablet, TAKE 1 TABLET BY MOUTH ONE TIME A DAY , Disp: 90 tablet, Rfl: 0  •  Multiple Vitamins-Minerals (PRESERVISION AREDS 2+MULTI VIT PO), Take  by mouth., Disp: , Rfl:   •  mupirocin (BACTROBAN) 2 % cream, mupirocin calcium 2 % topical cream, Disp: , Rfl:   •  nystatin (MYCOSTATIN) 347985 UNIT/GM powder, Apply  topically to the appropriate area as directed Daily As Needed (rash)., Disp: 60 g, Rfl: 0  •  pravastatin (PRAVACHOL) 40 MG tablet, TAKE ONE TABLET BY MOUTH EVERY NIGHT AT BEDTIME, Disp: 90 tablet, Rfl: 0  •  saccharomyces boulardii (Florastor) 250 MG capsule, Florastor 250 mg capsule, Disp: , Rfl:   •  triamterene-hydrochlorothiazide (DYAZIDE) 37.5-25 MG per capsule, TAKE ONE CAPSULE BY MOUTH DAILY, Disp: 90 capsule, Rfl: 2  •  TRULICITY 0.75 MG/0.5ML solution pen-injector, , Disp: , Rfl:     PHYSICAL EXAMINATION:   There were no vitals taken for this visit.  ECOG1  GENERAL: Age appropriate. No acute distress.   HEENT: Head  atraumatic, normocephalic.   NECK: Supple. No JVD. No lymphadenopathy.   LUNGS: Clear to auscultation bilaterally. No wheezing. No rhonchi.   HEART: Regular rate and rhythm. S1, S2, no murmurs.   ABDOMEN: Soft, nontender, nondistended. Bowel sounds positive. No  hepatosplenomegaly.   EXTREMITIES: No clubbing, cyanosis, or edema.   SKIN: No rashes. No purpura.   NEUROLOGIC: Awake and oriented x3. Strength 5 out of 5 in all muscle groups.     No visits with results within 2 Week(s) from this visit.   Latest known visit with results is:   Infusion on 06/24/2020   Component Date Value Ref Range Status   • PSA 06/24/2020 0.611  0.000 - 4.000 ng/mL Final   • Glucose 06/24/2020 283* 65 - 99 mg/dL Final    Glucose >180, Hemoglobin A1C recommended.   • BUN 06/24/2020 14  8 - 23 mg/dL Final   • Creatinine 06/24/2020 0.77  0.76 - 1.27 mg/dL Final   • Sodium 06/24/2020 139  136 - 145 mmol/L Final   • Potassium 06/24/2020 3.7  3.5 - 5.2 mmol/L Final   • Chloride 06/24/2020 102  98 - 107 mmol/L Final   • CO2 06/24/2020 26.6  22.0 - 29.0 mmol/L Final   • Calcium 06/24/2020 9.3  8.6 - 10.5 mg/dL Final   • Total Protein 06/24/2020 6.5  6.0 - 8.5 g/dL Final   • Albumin 06/24/2020 4.20  3.50 - 5.20 g/dL Final   • ALT (SGPT) 06/24/2020 22  1 - 41 U/L Final   • AST (SGOT) 06/24/2020 25  1 - 40 U/L Final   • Alkaline Phosphatase 06/24/2020 59  39 - 117 U/L Final   • Total Bilirubin 06/24/2020 0.6  0.2 - 1.2 mg/dL Final   • eGFR Non African Amer 06/24/2020 97  >60 mL/min/1.73 Final   • Globulin 06/24/2020 2.3  gm/dL Final   • A/G Ratio 06/24/2020 1.8  g/dL Final   • BUN/Creatinine Ratio 06/24/2020 18.2  7.0 - 25.0 Final   • Anion Gap 06/24/2020 10.4  5.0 - 15.0 mmol/L Final   • Magnesium 06/24/2020 1.9  1.6 - 2.4 mg/dL Final   • Phosphorus 06/24/2020 3.5  2.5 - 4.5 mg/dL Final   • WBC 06/24/2020 5.27  3.40 - 10.80 10*3/mm3 Final   • RBC 06/24/2020 4.50  4.14 - 5.80 10*6/mm3 Final   • Hemoglobin 06/24/2020 13.5  13.0 - 17.7 g/dL Final   •  Hematocrit 06/24/2020 40.3  37.5 - 51.0 % Final   • MCV 06/24/2020 89.6  79.0 - 97.0 fL Final   • MCH 06/24/2020 30.0  26.6 - 33.0 pg Final   • MCHC 06/24/2020 33.5  31.5 - 35.7 g/dL Final   • RDW 06/24/2020 13.5  12.3 - 15.4 % Final   • RDW-SD 06/24/2020 44.2  37.0 - 54.0 fl Final   • MPV 06/24/2020 10.0  6.0 - 12.0 fL Final   • Platelets 06/24/2020 137* 140 - 450 10*3/mm3 Final   • Neutrophil % 06/24/2020 59.8  42.7 - 76.0 % Final   • Lymphocyte % 06/24/2020 27.7  19.6 - 45.3 % Final   • Monocyte % 06/24/2020 7.4  5.0 - 12.0 % Final   • Eosinophil % 06/24/2020 3.4  0.3 - 6.2 % Final   • Basophil % 06/24/2020 0.9  0.0 - 1.5 % Final   • Immature Grans % 06/24/2020 0.8* 0.0 - 0.5 % Final   • Neutrophils, Absolute 06/24/2020 3.15  1.70 - 7.00 10*3/mm3 Final   • Lymphocytes, Absolute 06/24/2020 1.46  0.70 - 3.10 10*3/mm3 Final   • Monocytes, Absolute 06/24/2020 0.39  0.10 - 0.90 10*3/mm3 Final   • Eosinophils, Absolute 06/24/2020 0.18  0.00 - 0.40 10*3/mm3 Final   • Basophils, Absolute 06/24/2020 0.05  0.00 - 0.20 10*3/mm3 Final   • Immature Grans, Absolute 06/24/2020 0.04  0.00 - 0.05 10*3/mm3 Final   • nRBC 06/24/2020 0.0  0.0 - 0.2 /100 WBC Final        ASSESSMENT: The patient is a very pleasant 80-year-old gentleman with relapsed  prostate cancer.     PROBLEM LIST:  1. Prostate cancer, initially presented as [id number] status post radical  prostatectomy 2000.  2. Rise in PSA gradually over the last 2 years,   3. Multiple comorbidities including type 2 diabetes, hypertension, morbid  obesity, osteoarthritis, and hypercholesterolemia.  4. Erectile dysfunction.   5.  Family history of colon cancer  6.  Osteopenia  7.  Renal cyst    PLAN:  1. I did go over the blood work results in detail with the patient. I explained to him that  his last PSA on June 24, 2020 was 0.611.   I will continue Eligard 22.5mg q3 month.   2. The patient will return to clinic in 3 months with repeat blood work as well as serum PSA.   3. I will  continue Viagra as needed for erectile dysfunction.   4.  He will continue with [place] and Trulicity subcu for type 2 diabetes.  5.  We'll continue pravastatin for hypercholesterolemia  6.  Patient need to have 5 year follow-up colonoscopy which would be due 11/2021.  7. We reviewed the potential side effects of the treatment including hot flashes, impotence, joint pain, decrease in bone density, mood or sleep disturbance, and asthenia.  8.  I'll continue the patient on Prolia 60 mg subcutaneous every 6 month.  His next dose will be due today June 2020.  We'll continue calcium with vitamin D daily.  9.  Patient will keep scheduled follow up to see Dr. Jaimes for right renal cyst.  10. He will keep scheduled follow up with neurology.     Shannan Ramon MD    9/23/2020

## 2020-10-14 ENCOUNTER — LAB REQUISITION (OUTPATIENT)
Dept: LAB | Facility: HOSPITAL | Age: 85
End: 2020-10-14

## 2020-10-14 ENCOUNTER — OFFICE VISIT (OUTPATIENT)
Dept: INTERNAL MEDICINE | Facility: CLINIC | Age: 85
End: 2020-10-14

## 2020-10-14 VITALS
TEMPERATURE: 97.9 F | BODY MASS INDEX: 31.02 KG/M2 | OXYGEN SATURATION: 96 % | SYSTOLIC BLOOD PRESSURE: 152 MMHG | WEIGHT: 193 LBS | HEIGHT: 66 IN | DIASTOLIC BLOOD PRESSURE: 90 MMHG | HEART RATE: 80 BPM

## 2020-10-14 DIAGNOSIS — M15.9 PRIMARY OSTEOARTHRITIS INVOLVING MULTIPLE JOINTS: ICD-10-CM

## 2020-10-14 DIAGNOSIS — I10 ESSENTIAL HYPERTENSION: Primary | ICD-10-CM

## 2020-10-14 DIAGNOSIS — E78.00 PURE HYPERCHOLESTEROLEMIA, UNSPECIFIED: ICD-10-CM

## 2020-10-14 DIAGNOSIS — I10 ESSENTIAL (PRIMARY) HYPERTENSION: ICD-10-CM

## 2020-10-14 DIAGNOSIS — C61 PROSTATE CANCER (HCC): ICD-10-CM

## 2020-10-14 DIAGNOSIS — E78.00 HYPERCHOLESTEREMIA: ICD-10-CM

## 2020-10-14 PROCEDURE — 99213 OFFICE O/P EST LOW 20 MIN: CPT | Performed by: INTERNAL MEDICINE

## 2020-10-14 RX ORDER — AMLODIPINE BESYLATE 5 MG/1
5 TABLET ORAL DAILY
Qty: 30 TABLET | Refills: 1 | Status: ON HOLD | OUTPATIENT
Start: 2020-10-14 | End: 2022-06-04

## 2020-10-14 NOTE — PROGRESS NOTES
Subjective   Blane Dietz is a 85 y.o. male.   Chief Complaint   Patient presents with   • Hypertension       Hypertension  This is a chronic problem. The current episode started more than 1 year ago. Pertinent negatives include no chest pain, headaches, neck pain, palpitations or shortness of breath.   Hyperlipidemia  This is a chronic problem. The current episode started more than 1 year ago. Pertinent negatives include no chest pain, myalgias or shortness of breath.   Osteoarthritis  Pertinent negatives include no diarrhea, dysuria, fatigue, fever or rash. His past medical history is significant for osteoarthritis.        Prostate cancer and is following with onc.    The following portions of the patient's history were reviewed and updated as appropriate: allergies, current medications, past family history, past medical history, past social history, past surgical history and problem list.    Review of Systems   Constitutional: Negative for activity change, appetite change, chills, diaphoresis, fatigue, fever and unexpected weight change.   HENT: Negative for congestion, ear discharge, ear pain, mouth sores, nosebleeds, sinus pressure, sneezing and sore throat.    Eyes: Negative for pain, discharge and itching.   Respiratory: Negative for cough, chest tightness, shortness of breath and wheezing.    Cardiovascular: Negative for chest pain, palpitations and leg swelling.   Gastrointestinal: Negative for abdominal pain, constipation, diarrhea, nausea and vomiting.   Endocrine: Negative for cold intolerance, heat intolerance, polydipsia and polyphagia.   Genitourinary: Negative for dysuria, flank pain, frequency, hematuria and urgency.   Musculoskeletal: Negative for arthralgias, back pain, gait problem, myalgias, neck pain and neck stiffness.   Skin: Negative for color change, pallor and rash.   Neurological: Negative for seizures, speech difficulty, numbness and headaches.   Psychiatric/Behavioral: Negative for  "agitation, confusion, decreased concentration and sleep disturbance. The patient is not nervous/anxious.      /90   Pulse 80   Temp 97.9 °F (36.6 °C)   Ht 167.6 cm (66\")   Wt 87.5 kg (193 lb)   SpO2 96%   BMI 31.15 kg/m²     Objective   Physical Exam   Constitutional: He appears well-developed.   HENT:   Head: Normocephalic.   Right Ear: External ear normal.   Left Ear: External ear normal.   Nose: Nose normal.   Eyes: Pupils are equal, round, and reactive to light. Conjunctivae are normal.   Neck: No JVD present. No thyromegaly present.   Cardiovascular: Normal rate, regular rhythm and normal heart sounds. Exam reveals no friction rub.   No murmur heard.  Pulmonary/Chest: Effort normal and breath sounds normal. No respiratory distress. He has no wheezes. He has no rales.   Abdominal: Soft. Bowel sounds are normal. He exhibits no distension. There is no abdominal tenderness. There is no guarding.   Musculoskeletal: No tenderness.   Lymphadenopathy:     He has no cervical adenopathy.   Neurological: He displays normal reflexes. No cranial nerve deficit.   Skin: No rash noted.   Psychiatric: His behavior is normal.   Nursing note and vitals reviewed.      Assessment/Plan   Blane was seen today for hypertension and osteoarthritis.    Diagnoses and all orders for this visit:    Essential hypertension  Added amlodipine 5mg po qd  Hypercholesteremia  Lipids and cmp  cmp and lipids  Osteoarthritis  stable  Prostate cancer  Followed by onc. PSA trending back down.              "

## 2020-10-15 LAB
ALBUMIN SERPL-MCNC: 4.5 G/DL (ref 3.5–5.2)
ALBUMIN/GLOB SERPL: 2 G/DL
ALP SERPL-CCNC: 64 U/L (ref 39–117)
ALT SERPL-CCNC: 41 U/L (ref 1–41)
AST SERPL-CCNC: 34 U/L (ref 1–40)
BILIRUB SERPL-MCNC: 0.7 MG/DL (ref 0–1.2)
BUN SERPL-MCNC: 17 MG/DL (ref 8–23)
BUN/CREAT SERPL: 18.5 (ref 7–25)
CALCIUM SERPL-MCNC: 9.7 MG/DL (ref 8.6–10.5)
CHLORIDE SERPL-SCNC: 98 MMOL/L (ref 98–107)
CHOLEST SERPL-MCNC: 205 MG/DL (ref 0–200)
CO2 SERPL-SCNC: 24.6 MMOL/L (ref 22–29)
CREAT SERPL-MCNC: 0.92 MG/DL (ref 0.76–1.27)
GLOBULIN SER CALC-MCNC: 2.3 GM/DL
GLUCOSE SERPL-MCNC: 317 MG/DL (ref 65–99)
HDLC SERPL-MCNC: 59 MG/DL (ref 40–60)
LDLC SERPL CALC-MCNC: 87 MG/DL (ref 0–100)
POTASSIUM SERPL-SCNC: 5.7 MMOL/L (ref 3.5–5.2)
PROT SERPL-MCNC: 6.8 G/DL (ref 6–8.5)
SODIUM SERPL-SCNC: 139 MMOL/L (ref 136–145)
TRIGL SERPL-MCNC: 362 MG/DL (ref 0–150)
VLDLC SERPL CALC-MCNC: 59 MG/DL (ref 5–40)

## 2020-10-16 ENCOUNTER — TELEPHONE (OUTPATIENT)
Dept: INTERNAL MEDICINE | Facility: CLINIC | Age: 85
End: 2020-10-16

## 2020-10-16 DIAGNOSIS — E87.5 HYPERKALEMIA: Primary | ICD-10-CM

## 2020-10-16 LAB
ALBUMIN/CREAT UR: 58 MG/G CREAT (ref 0–29)
CREAT UR-MCNC: 28.2 MG/DL
MICROALBUMIN UR-MCNC: 16.3 UG/ML

## 2020-10-16 NOTE — TELEPHONE ENCOUNTER
----- Message from Maria Fernanda Wagner DO sent at 10/16/2020 10:41 AM EDT -----  Potassium level high. Needs repeat level Monday.

## 2020-10-16 NOTE — TELEPHONE ENCOUNTER
Called and explained lab result to patient and advised patient to come in on Monday morning for repeat labs. Patient verbalized understanding.

## 2020-10-19 ENCOUNTER — LAB (OUTPATIENT)
Dept: LAB | Facility: HOSPITAL | Age: 85
End: 2020-10-19

## 2020-10-19 DIAGNOSIS — E87.5 HYPERKALEMIA: ICD-10-CM

## 2020-10-19 LAB
ANION GAP SERPL CALCULATED.3IONS-SCNC: 14.4 MMOL/L (ref 5–15)
BUN SERPL-MCNC: 21 MG/DL (ref 8–23)
BUN/CREAT SERPL: 23.1 (ref 7–25)
CALCIUM SPEC-SCNC: 9.9 MG/DL (ref 8.6–10.5)
CHLORIDE SERPL-SCNC: 97 MMOL/L (ref 98–107)
CO2 SERPL-SCNC: 26.6 MMOL/L (ref 22–29)
CREAT SERPL-MCNC: 0.91 MG/DL (ref 0.76–1.27)
GFR SERPL CREATININE-BSD FRML MDRD: 80 ML/MIN/1.73
GLUCOSE SERPL-MCNC: 253 MG/DL (ref 65–99)
POTASSIUM SERPL-SCNC: 4.5 MMOL/L (ref 3.5–5.2)
SODIUM SERPL-SCNC: 138 MMOL/L (ref 136–145)

## 2020-10-19 PROCEDURE — 80048 BASIC METABOLIC PNL TOTAL CA: CPT | Performed by: INTERNAL MEDICINE

## 2020-10-19 PROCEDURE — 36415 COLL VENOUS BLD VENIPUNCTURE: CPT

## 2020-10-27 ENCOUNTER — PATIENT OUTREACH (OUTPATIENT)
Dept: CASE MANAGEMENT | Facility: OTHER | Age: 85
End: 2020-10-27

## 2020-10-27 NOTE — OUTREACH NOTE
Patient Outreach Note    See previous outreach notes    Contacted pt regarding his med adherence, logging BP and BS.  States he is trying really hard and doing better with med adherence.  Uses [employer].  Also states he is logging blood sugars and blood pressure.  Sees Dr. Xavier Boston for his diabetes.  Thinks his last A1C was in the 9s and he is adjusting his diet to try to bring this down.  Also, at last PCP visit, he reports that his bp was up and was started on new medicine, which is bringing it down and he denies any issue with the medicine.  He reports that he has DM eye exam tomorrow and he is scheduled for AWV in Jan 2021 with Dr. Wagner. He is compliant with his medial appts.   He is up and about, however does walk with cane. He states he got a shot of cortisone in his knee and so far it is helping.  Wants to try to avoid surgery on knee if possible.   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 He denies any needs at present, states he had RN-ACM's number for any future needs and voiced his appreciation for the call.      Ginny Montaño RN  Ambulatory     10/27/2020, 15:03 EDT

## 2020-11-14 NOTE — TELEPHONE ENCOUNTER
Last Office Visit: 10/14/20  Next Office Visit: 01/13/21    Labs completed in past 6 months? Yes     Last Refill Date: 09/14/20  Quantity: 90  Refills: 0

## 2020-11-16 RX ORDER — METOPROLOL SUCCINATE 100 MG/1
TABLET, EXTENDED RELEASE ORAL
Qty: 90 TABLET | Refills: 0 | Status: SHIPPED | OUTPATIENT
Start: 2020-11-16 | End: 2022-10-30 | Stop reason: HOSPADM

## 2020-12-01 RX ORDER — EMPAGLIFLOZIN 10 MG/1
10 TABLET, FILM COATED ORAL DAILY
Qty: 30 TABLET | Refills: 1 | Status: SHIPPED | OUTPATIENT
Start: 2020-12-01 | End: 2021-02-25

## 2020-12-23 ENCOUNTER — OFFICE VISIT (OUTPATIENT)
Dept: ONCOLOGY | Facility: CLINIC | Age: 85
End: 2020-12-23

## 2020-12-23 VITALS
SYSTOLIC BLOOD PRESSURE: 159 MMHG | BODY MASS INDEX: 31.02 KG/M2 | DIASTOLIC BLOOD PRESSURE: 98 MMHG | OXYGEN SATURATION: 98 % | TEMPERATURE: 97.8 F | HEART RATE: 84 BPM | WEIGHT: 193 LBS | HEIGHT: 66 IN | RESPIRATION RATE: 16 BRPM

## 2020-12-23 DIAGNOSIS — C61 PROSTATE CANCER (HCC): Primary | ICD-10-CM

## 2020-12-23 PROCEDURE — 99214 OFFICE O/P EST MOD 30 MIN: CPT | Performed by: INTERNAL MEDICINE

## 2020-12-23 NOTE — PROGRESS NOTES
DATE OF VISIT: 12/23/2020     REASON FOR VISIT:   1. Prostate cancer. Presented with stage I, [id number], PSA at diagnosis 3 status  post prostatectomy in 2000.  2. Relapsed disease with rise in PSA gradually over the last couple of years,  most recent PSA 7.1 ng/mL on 01/25/2016.      HISTORY OF PRESENT ILLNESS: The patient is a very pleasant 80-year-old  gentleman with past medical history significant for prostate cancer status post  radical prostatectomy in 2000. The patient never received adjuvant treatments,  neither radiation, GnRH or antiandrogen treatment. The patient's PSA started  to go up gradually over the last 2 years. Patient was referred to me on  06/13/2014 and I did restaging workup that came back negative including bone  scan, CAT scans chest, abdomen and pelvis. We elected to proceed with watchful  waiting. Patient is here today in scheduled followup visit.     SUBJECTIVE: Mr. Dietz is here today by himself.  All in all he is doing fairly well.  He denies any fever chills night sweats.  He has been doing very well.  He is staying active and trying to stay safe during the pandemic.      REVIEW OF SYSTEMS: All the other 9 systems are reviewed by me and negative  except what is mentioned in HPI and subjective.      PAST MEDICAL HISTORY/SOCIAL HISTORY/FAMILY HISTORY: Unchanged from my prior  documentation on 06/13/2014.       Current Outpatient Medications:   •  allopurinol (ZYLOPRIM) 300 MG tablet, TAKE 1 TABLET BY MOUTH ONE TIME A DAY , Disp: 90 tablet, Rfl: 0  •  amLODIPine (NORVASC) 5 MG tablet, Take 1 tablet by mouth Daily., Disp: 30 tablet, Rfl: 1  •  aspirin 81 MG EC tablet, Take 81 mg by mouth Daily., Disp: , Rfl:   •  CRISTINA CONTOUR TEST test strip, , Disp: , Rfl:   •  Cholecalciferol (VITAMIN D3) 2000 UNITS capsule, Take  by mouth 2 (two) times a day., Disp: , Rfl:   •  Cinnamon 500 MG capsule, Take 2,000 mg by mouth Daily., Disp: , Rfl:   •  Coenzyme Q10 100 MG tablet, Take 2 tablets by mouth  Daily., Disp: , Rfl:   •  fenofibric acid (TRILIPIX) 135 MG capsule delayed-release delayed release capsule, TAKE 1 CAPSULE BY MOUTH ONE TIME A DAY , Disp: 30 capsule, Rfl: 2  •  fluticasone (FLONASE) 50 MCG/ACT nasal spray, 2 sprays into the nostril(s) as directed by provider Daily. 2 puffs each nostril, Disp: 1 bottle, Rfl: 0  •  hydroCHLOROthiazide (MICROZIDE PO), hydrochlorothiazide, Disp: , Rfl:   •  Jardiance 10 MG tablet, Take 10 mg by mouth Daily. Please call the office to schedule an appointment., Disp: 30 tablet, Rfl: 1  •  l-methylfolate-algae-B6-B12 (METANX) 3-90.314-2-35 MG capsule capsule, TAKE 1 CAPSULE BY MOUTH TWICE A DAY., Disp: 180 capsule, Rfl: 1  •  Lecithin 1200 MG capsule, Take  by mouth Daily., Disp: , Rfl:   •  metFORMIN (GLUCOPHAGE) 500 MG tablet, Take 2 tablets by mouth 2 (Two) Times a Day With Meals., Disp: 120 tablet, Rfl: 2  •  methocarbamol (ROBAXIN) 500 MG tablet, Take 1 tablet by mouth 3 (Three) Times a Day As Needed for Muscle Spasms., Disp: 30 tablet, Rfl: 0  •  metoprolol succinate XL (TOPROL-XL) 100 MG 24 hr tablet, TAKE 1 TABLET BY MOUTH EVERY DAY , Disp: 90 tablet, Rfl: 0  •  Multiple Vitamins-Minerals (PRESERVISION AREDS 2+MULTI VIT PO), Take  by mouth., Disp: , Rfl:   •  mupirocin (BACTROBAN) 2 % cream, mupirocin calcium 2 % topical cream, Disp: , Rfl:   •  nystatin (MYCOSTATIN) 645404 UNIT/GM powder, Apply  topically to the appropriate area as directed Daily As Needed (rash)., Disp: 60 g, Rfl: 0  •  pravastatin (PRAVACHOL) 40 MG tablet, TAKE ONE TABLET BY MOUTH EVERY NIGHT AT BEDTIME, Disp: 90 tablet, Rfl: 0  •  saccharomyces boulardii (Florastor) 250 MG capsule, Florastor 250 mg capsule, Disp: , Rfl:   •  triamterene-hydrochlorothiazide (DYAZIDE) 37.5-25 MG per capsule, TAKE ONE CAPSULE BY MOUTH DAILY, Disp: 90 capsule, Rfl: 2  •  TRULICITY 0.75 MG/0.5ML solution pen-injector, , Disp: , Rfl:     PHYSICAL EXAMINATION:   /98   Pulse 84   Temp 97.8 °F (36.6 °C)  "(Temporal)   Resp 16   Ht 167.6 cm (66\")   Wt 87.5 kg (193 lb)   SpO2 98%   BMI 31.15 kg/m²   ECOG1  GENERAL: Age appropriate. No acute distress.   HEENT: Head atraumatic, normocephalic.   NECK: Supple. No JVD. No lymphadenopathy.   LUNGS: Clear to auscultation bilaterally. No wheezing. No rhonchi.   HEART: Regular rate and rhythm. S1, S2, no murmurs.   ABDOMEN: Soft, nontender, nondistended. Bowel sounds positive. No  hepatosplenomegaly.   EXTREMITIES: No clubbing, cyanosis, or edema.   SKIN: No rashes. No purpura.   NEUROLOGIC: Awake and oriented x3. Strength 5 out of 5 in all muscle groups.     No visits with results within 2 Week(s) from this visit.   Latest known visit with results is:   Lab on 10/19/2020   Component Date Value Ref Range Status   • Glucose 10/19/2020 253* 65 - 99 mg/dL Final   • BUN 10/19/2020 21  8 - 23 mg/dL Final   • Creatinine 10/19/2020 0.91  0.76 - 1.27 mg/dL Final   • Sodium 10/19/2020 138  136 - 145 mmol/L Final   • Potassium 10/19/2020 4.5  3.5 - 5.2 mmol/L Final   • Chloride 10/19/2020 97* 98 - 107 mmol/L Final   • CO2 10/19/2020 26.6  22.0 - 29.0 mmol/L Final   • Calcium 10/19/2020 9.9  8.6 - 10.5 mg/dL Final   • eGFR Non African Amer 10/19/2020 80  >60 mL/min/1.73 Final   • BUN/Creatinine Ratio 10/19/2020 23.1  7.0 - 25.0 Final   • Anion Gap 10/19/2020 14.4  5.0 - 15.0 mmol/L Final        ASSESSMENT: The patient is a very pleasant 80-year-old gentleman with relapsed  prostate cancer.     PROBLEM LIST:  1. Prostate cancer, initially presented as [id number] status post radical  prostatectomy 2000.  2. Rise in PSA gradually over the last 2 years,   3. Multiple comorbidities including type 2 diabetes, hypertension, morbid  obesity, osteoarthritis, and hypercholesterolemia.  4. Erectile dysfunction.   5.  Family history of colon cancer  6.  Osteopenia    PLAN:  1. I did go over the blood work results in detail with the patient. I explained to him that  his last PSA on September 23, " 2020 has slightly increased to 0.77.   I will continue Eligard 22.5mg q3 month.   2. The patient will return to clinic in 3 months with repeat blood work as well as serum PSA.   3. I will continue Viagra as needed for erectile dysfunction.   4.  He will continue with Mo and Trulicity subcu for type 2 diabetes.  5.  We'll continue pravastatin for hypercholesterolemia  6.  Patient need to have 5 year follow-up colonoscopy which would be due 11/2021.  7. We reviewed the potential side effects of the treatment including hot flashes, impotence, joint pain, decrease in bone density, mood or sleep disturbance, and asthenia.  8.  I'll continue the patient on Prolia 60 mg subcutaneous every 6 month.  His next dose will be due today June 2020.  We'll continue calcium with vitamin D daily.      Shannan Ramon MD    12/23/2020

## 2020-12-30 ENCOUNTER — INFUSION (OUTPATIENT)
Dept: ONCOLOGY | Facility: HOSPITAL | Age: 85
End: 2020-12-30

## 2020-12-30 VITALS
RESPIRATION RATE: 16 BRPM | SYSTOLIC BLOOD PRESSURE: 174 MMHG | HEART RATE: 81 BPM | TEMPERATURE: 97.9 F | DIASTOLIC BLOOD PRESSURE: 78 MMHG

## 2020-12-30 DIAGNOSIS — C61 PROSTATE CANCER (HCC): Primary | ICD-10-CM

## 2020-12-30 LAB
ALBUMIN SERPL-MCNC: 4.2 G/DL (ref 3.5–5.2)
ALBUMIN/GLOB SERPL: 1.7 G/DL
ALP SERPL-CCNC: 66 U/L (ref 39–117)
ALT SERPL W P-5'-P-CCNC: 43 U/L (ref 1–41)
ANION GAP SERPL CALCULATED.3IONS-SCNC: 11.7 MMOL/L (ref 5–15)
AST SERPL-CCNC: 39 U/L (ref 1–40)
BASOPHILS # BLD AUTO: 0.11 10*3/MM3 (ref 0–0.2)
BASOPHILS NFR BLD AUTO: 2 % (ref 0–1.5)
BILIRUB SERPL-MCNC: 0.7 MG/DL (ref 0–1.2)
BUN SERPL-MCNC: 15 MG/DL (ref 8–23)
BUN/CREAT SERPL: 21.1 (ref 7–25)
CALCIUM SPEC-SCNC: 9.3 MG/DL (ref 8.6–10.5)
CHLORIDE SERPL-SCNC: 101 MMOL/L (ref 98–107)
CO2 SERPL-SCNC: 26.3 MMOL/L (ref 22–29)
CREAT SERPL-MCNC: 0.71 MG/DL (ref 0.76–1.27)
DEPRECATED RDW RBC AUTO: 46.6 FL (ref 37–54)
EOSINOPHIL # BLD AUTO: 0.26 10*3/MM3 (ref 0–0.4)
EOSINOPHIL NFR BLD AUTO: 4.7 % (ref 0.3–6.2)
ERYTHROCYTE [DISTWIDTH] IN BLOOD BY AUTOMATED COUNT: 14.1 % (ref 12.3–15.4)
GFR SERPL CREATININE-BSD FRML MDRD: 106 ML/MIN/1.73
GLOBULIN UR ELPH-MCNC: 2.5 GM/DL
GLUCOSE SERPL-MCNC: 326 MG/DL (ref 65–99)
HCT VFR BLD AUTO: 42.3 % (ref 37.5–51)
HGB BLD-MCNC: 13.9 G/DL (ref 13–17.7)
IMM GRANULOCYTES # BLD AUTO: 0.09 10*3/MM3 (ref 0–0.05)
IMM GRANULOCYTES NFR BLD AUTO: 1.6 % (ref 0–0.5)
LYMPHOCYTES # BLD AUTO: 1.34 10*3/MM3 (ref 0.7–3.1)
LYMPHOCYTES NFR BLD AUTO: 24.1 % (ref 19.6–45.3)
MAGNESIUM SERPL-MCNC: 2 MG/DL (ref 1.6–2.4)
MCH RBC QN AUTO: 29.8 PG (ref 26.6–33)
MCHC RBC AUTO-ENTMCNC: 32.9 G/DL (ref 31.5–35.7)
MCV RBC AUTO: 90.8 FL (ref 79–97)
MONOCYTES # BLD AUTO: 0.48 10*3/MM3 (ref 0.1–0.9)
MONOCYTES NFR BLD AUTO: 8.6 % (ref 5–12)
NEUTROPHILS NFR BLD AUTO: 3.28 10*3/MM3 (ref 1.7–7)
NEUTROPHILS NFR BLD AUTO: 59 % (ref 42.7–76)
NRBC BLD AUTO-RTO: 0 /100 WBC (ref 0–0.2)
PHOSPHATE SERPL-MCNC: 4.4 MG/DL (ref 2.5–4.5)
PLATELET # BLD AUTO: 146 10*3/MM3 (ref 140–450)
PMV BLD AUTO: 9.7 FL (ref 6–12)
POTASSIUM SERPL-SCNC: 3.8 MMOL/L (ref 3.5–5.2)
PROT SERPL-MCNC: 6.7 G/DL (ref 6–8.5)
PSA SERPL-MCNC: 1.2 NG/ML (ref 0–4)
RBC # BLD AUTO: 4.66 10*6/MM3 (ref 4.14–5.8)
SODIUM SERPL-SCNC: 139 MMOL/L (ref 136–145)
WBC # BLD AUTO: 5.56 10*3/MM3 (ref 3.4–10.8)

## 2020-12-30 PROCEDURE — 96372 THER/PROPH/DIAG INJ SC/IM: CPT

## 2020-12-30 PROCEDURE — 96401 CHEMO ANTI-NEOPL SQ/IM: CPT

## 2020-12-30 PROCEDURE — 36415 COLL VENOUS BLD VENIPUNCTURE: CPT

## 2020-12-30 PROCEDURE — 80053 COMPREHEN METABOLIC PANEL: CPT

## 2020-12-30 PROCEDURE — 25010000002 LEUPROLIDE 22.5 MG KIT: Performed by: INTERNAL MEDICINE

## 2020-12-30 PROCEDURE — 84153 ASSAY OF PSA TOTAL: CPT

## 2020-12-30 PROCEDURE — 85025 COMPLETE CBC W/AUTO DIFF WBC: CPT

## 2020-12-30 PROCEDURE — 83735 ASSAY OF MAGNESIUM: CPT

## 2020-12-30 PROCEDURE — 25010000002 DENOSUMAB 60 MG/ML SOLUTION PREFILLED SYRINGE: Performed by: INTERNAL MEDICINE

## 2020-12-30 PROCEDURE — 84100 ASSAY OF PHOSPHORUS: CPT

## 2020-12-30 RX ADMIN — LEUPROLIDE ACETATE 22.5 MG: KIT SUBCUTANEOUS at 09:35

## 2020-12-30 RX ADMIN — DENOSUMAB 60 MG: 60 INJECTION SUBCUTANEOUS at 09:38

## 2021-01-05 ENCOUNTER — OFFICE VISIT (OUTPATIENT)
Dept: ORTHOPEDIC SURGERY | Facility: CLINIC | Age: 86
End: 2021-01-05

## 2021-01-05 VITALS — BODY MASS INDEX: 31.12 KG/M2 | HEIGHT: 66 IN | WEIGHT: 193.6 LBS | TEMPERATURE: 96.9 F

## 2021-01-05 DIAGNOSIS — M17.0 PRIMARY OSTEOARTHRITIS OF BOTH KNEES: ICD-10-CM

## 2021-01-05 DIAGNOSIS — M25.562 ARTHRALGIA OF KNEE, LEFT: Primary | ICD-10-CM

## 2021-01-05 DIAGNOSIS — M25.561 RIGHT KNEE PAIN, UNSPECIFIED CHRONICITY: ICD-10-CM

## 2021-01-05 PROCEDURE — 99212 OFFICE O/P EST SF 10 MIN: CPT | Performed by: PHYSICIAN ASSISTANT

## 2021-01-05 NOTE — PROGRESS NOTES
"Subjective   Patient ID: Blane Dietz is a 85 y.o. male  Follow-up of the Left Knee (Patient states he does not feel he needs injection today. Only has occasional pain, doesn't last long. Happens when he twists knee/leg or turns a corner. ) and Pain of the Right Knee (Has some pain in knee, wears a brace that provides relief. )         History of Present Illness  Patient is following up for scheduled appointment regarding bilateral knee pain.  He states after receiving the left knee cortisone injection he has had very little pain.  He states the pain is on occasion but does not last long.  He denies redness, warmth or instability.  Denies mechanical locking of the knee.  He would like to avoid a cortisone injection today because he states \"his pain is not bad enough\"                                                   Past Medical History:   Diagnosis Date   • Aortic stenosis     status post ROSS procedure, remote, with December 2015 echocardiography revealing normal aortic and pulmonary valve function, normal ejection fraction and mild to moderate MR   • Arthritis    • Depression    • Diabetes mellitus (CMS/HCC)    • Diverticulitis    • Exogenous obesity    • Gout    • Heart murmur    • History of vitamin D deficiency    • Hyperlipidemia    • Hypertension    • Malignant neoplasm of prostate (CMS/HCC)    • Sleep apnea    • Vertigo         Past Surgical History:   Procedure Laterality Date   • CARDIAC VALVE REPLACEMENT     • COLON SURGERY     • COLONOSCOPY     • COLOSTOMY  1999   • COLOSTOMY REVISION     • EXPLORATORY LAPAROTOMY      With Tissue Removal   • LIPOMA EXCISION     • MOHS SURGERY     • OTHER SURGICAL HISTORY      Porcine Valve   • PROSTATE SURGERY     • PROSTATECTOMY  2000     History of Prostatectomy Perineal Radical   • SKIN CANCER EXCISION      from head and lip   • TONSILLECTOMY         Family History   Problem Relation Age of Onset   • Diabetes Mother    • Lung cancer Mother    • Cancer Mother    • " Heart disease Father    • Breast cancer Other    • Cancer Other    • Hypertension Other    • Migraines Other    • Obesity Other    • Diabetes Other        Social History     Socioeconomic History   • Marital status: Single     Spouse name: Not on file   • Number of children: Not on file   • Years of education: Not on file   • Highest education level: Not on file   Occupational History   • 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   Tobacco Use   • Smoking status: Never Smoker   • Smokeless tobacco: Never Used   Substance and Sexual Activity   • Alcohol use: No   • Drug use: No   • Sexual activity: Defer   Social History Narrative    Right hand dominant         Current Outpatient Medications:   •  allopurinol (ZYLOPRIM) 300 MG tablet, TAKE 1 TABLET BY MOUTH ONE TIME A DAY , Disp: 90 tablet, Rfl: 0  •  amLODIPine (NORVASC) 5 MG tablet, Take 1 tablet by mouth Daily., Disp: 30 tablet, Rfl: 1  •  aspirin 81 MG EC tablet, Take 81 mg by mouth Daily., Disp: , Rfl:   •  CRISTINA CONTOUR TEST test strip, , Disp: , Rfl:   •  Cholecalciferol (VITAMIN D3) 2000 UNITS capsule, Take  by mouth 2 (two) times a day., Disp: , Rfl:   •  Cinnamon 500 MG capsule, Take 2,000 mg by mouth Daily., Disp: , Rfl:   •  Coenzyme Q10 100 MG tablet, Take 2 tablets by mouth Daily., Disp: , Rfl:   •  fenofibric acid (TRILIPIX) 135 MG capsule delayed-release delayed release capsule, TAKE 1 CAPSULE BY MOUTH ONE TIME A DAY , Disp: 30 capsule, Rfl: 2  •  fluticasone (FLONASE) 50 MCG/ACT nasal spray, 2 sprays into the nostril(s) as directed by provider Daily. 2 puffs each nostril, Disp: 1 bottle, Rfl: 0  •  hydroCHLOROthiazide (MICROZIDE PO), hydrochlorothiazide, Disp: , Rfl:   •  Jardiance 10 MG tablet, Take 10 mg by mouth Daily. Please call the office to schedule an appointment., Disp: 30 tablet, Rfl: 1  •  l-methylfolate-algae-B6-B12 (METANX) 3-90.314-2-35 MG capsule capsule, TAKE 1 CAPSULE BY MOUTH TWICE A DAY., Disp: 180 capsule, Rfl: 1  •   Lecithin 1200 MG capsule, Take  by mouth Daily., Disp: , Rfl:   •  metFORMIN (GLUCOPHAGE) 500 MG tablet, Take 2 tablets by mouth 2 (Two) Times a Day With Meals., Disp: 120 tablet, Rfl: 2  •  methocarbamol (ROBAXIN) 500 MG tablet, Take 1 tablet by mouth 3 (Three) Times a Day As Needed for Muscle Spasms., Disp: 30 tablet, Rfl: 0  •  metoprolol succinate XL (TOPROL-XL) 100 MG 24 hr tablet, TAKE 1 TABLET BY MOUTH EVERY DAY , Disp: 90 tablet, Rfl: 0  •  Multiple Vitamins-Minerals (PRESERVISION AREDS 2+MULTI VIT PO), Take  by mouth., Disp: , Rfl:   •  mupirocin (BACTROBAN) 2 % cream, mupirocin calcium 2 % topical cream, Disp: , Rfl:   •  nystatin (MYCOSTATIN) 520418 UNIT/GM powder, Apply  topically to the appropriate area as directed Daily As Needed (rash)., Disp: 60 g, Rfl: 0  •  pravastatin (PRAVACHOL) 40 MG tablet, TAKE ONE TABLET BY MOUTH EVERY NIGHT AT BEDTIME, Disp: 90 tablet, Rfl: 0  •  saccharomyces boulardii (Florastor) 250 MG capsule, Florastor 250 mg capsule, Disp: , Rfl:   •  triamterene-hydrochlorothiazide (DYAZIDE) 37.5-25 MG per capsule, TAKE ONE CAPSULE BY MOUTH DAILY, Disp: 90 capsule, Rfl: 2  •  TRULICITY 0.75 MG/0.5ML solution pen-injector, , Disp: , Rfl:     Allergies   Allergen Reactions   • Ampicillin Anaphylaxis   • Medrol [Methylprednisolone] Unknown - High Severity     Made his blood sugar get really high, can't take this   • Myrbetriq [Mirabegron] Unknown - High Severity     High BP   • Penicillins Anaphylaxis   • Bee Venom Swelling       Review of Systems   Constitutional: Negative for fever.   HENT: Negative for dental problem and voice change.    Eyes: Negative for visual disturbance.   Respiratory: Negative for shortness of breath.    Cardiovascular: Negative for chest pain.   Gastrointestinal: Negative for abdominal pain.   Genitourinary: Negative for dysuria.   Musculoskeletal: Positive for arthralgias and gait problem (uses cane). Negative for joint swelling.   Skin: Negative for rash.  "  Neurological: Negative for speech difficulty.   Hematological: Does not bruise/bleed easily.   Psychiatric/Behavioral: Negative for confusion.        I have reviewed the medical and surgical history, family history, social history, medications, and/or allergies, and the review of systems of this report.    Objective   Temp 96.9 °F (36.1 °C)   Ht 167.6 cm (66\")   Wt 87.8 kg (193 lb 9.6 oz)   BMI 31.25 kg/m²    Physical Exam  Vitals signs and nursing note reviewed.   Constitutional:       Appearance: Normal appearance.   Pulmonary:      Effort: Pulmonary effort is normal.   Musculoskeletal:      Right knee: He exhibits no effusion, no ecchymosis, no deformity and no erythema. No tenderness found. No medial joint line and no lateral joint line tenderness noted.      Left knee: He exhibits normal range of motion, no swelling, no effusion, no ecchymosis, no laceration and no erythema. No tenderness found. No medial joint line and no lateral joint line tenderness noted.   Neurological:      Mental Status: He is alert and oriented to person, place, and time.   Psychiatric:         Behavior: Behavior normal.       Right Knee Exam     Other   Effusion: no effusion present      Left Knee Exam     Other   Effusion: no effusion present           Extremity DVT signs are negative on physical exam with negative Emilie sign, no calf pain, no palpable cords and no skin tone change   Neurologic Exam     Mental Status   Oriented to person, place, and time.        Bilateral knee extensor mechanisms are intact    Assessment/Plan   Independent Review of Radiographic Studies:    No new imaging done today.  Reviewed imaging with patient at a prior visit.      Procedures       Diagnoses and all orders for this visit:    1. Arthralgia of knee, left (Primary)    2. Primary osteoarthritis of both knees    3. Right knee pain, unspecified chronicity       Regular exercise as tolerated  Orthopedic activities reviewed and patient expressed " appreciation  Discussion of orthopedic goals  Risk, benefits, and merits of treatment alternatives reviewed with the patient and questions answered  Ice, heat, and/or modalities as beneficial    Recommendations/Plan:  Exercise, medications, injections, other patient advice, and return appointment as noted.  Patient is encouraged to call or return for any issues or concerns.    Patient agreeable to call or return sooner for any concerns.    Patient is always welcome to have the cortisone injections if needed             EMR Dragon-transcription disclaimer:  This encounter note is an electronic transcription of spoken language to printed text.  Electronic transcription of spoken language may permit erroneous or at times nonsensical words or phrases to be inadvertently transcribed.  Although I have reviewed the note for such errors, some may still exist

## 2021-01-19 ENCOUNTER — TRANSCRIBE ORDERS (OUTPATIENT)
Dept: ADMINISTRATIVE | Facility: HOSPITAL | Age: 86
End: 2021-01-19

## 2021-01-19 ENCOUNTER — HOSPITAL ENCOUNTER (OUTPATIENT)
Dept: GENERAL RADIOLOGY | Facility: HOSPITAL | Age: 86
Discharge: HOME | End: 2021-01-19
Admitting: INTERNAL MEDICINE

## 2021-01-19 DIAGNOSIS — R10.9 RIGHT SIDED ABDOMINAL PAIN: ICD-10-CM

## 2021-01-19 DIAGNOSIS — R06.00 DYSPNEA, UNSPECIFIED TYPE: Primary | ICD-10-CM

## 2021-01-19 DIAGNOSIS — R10.9 ABDOMINAL PAIN, UNSPECIFIED ABDOMINAL LOCATION: ICD-10-CM

## 2021-01-19 PROCEDURE — 71100 X-RAY EXAM RIBS UNI 2 VIEWS: CPT

## 2021-01-19 PROCEDURE — 71046 X-RAY EXAM CHEST 2 VIEWS: CPT

## 2021-01-19 PROCEDURE — 74018 RADEX ABDOMEN 1 VIEW: CPT

## 2021-02-25 RX ORDER — EMPAGLIFLOZIN 10 MG/1
TABLET, FILM COATED ORAL
Qty: 30 TABLET | Refills: 3 | Status: SHIPPED | OUTPATIENT
Start: 2021-02-25 | End: 2022-03-30

## 2021-02-26 ENCOUNTER — TRANSCRIBE ORDERS (OUTPATIENT)
Dept: ADMINISTRATIVE | Facility: HOSPITAL | Age: 86
End: 2021-02-26

## 2021-02-26 DIAGNOSIS — R79.89 ELEVATED D-DIMER: Primary | ICD-10-CM

## 2021-03-05 ENCOUNTER — HOSPITAL ENCOUNTER (OUTPATIENT)
Dept: GENERAL RADIOLOGY | Facility: HOSPITAL | Age: 86
Discharge: HOME | End: 2021-03-05
Admitting: RADIOLOGY

## 2021-03-05 ENCOUNTER — HOSPITAL ENCOUNTER (OUTPATIENT)
Dept: NUCLEAR MEDICINE | Facility: HOSPITAL | Age: 86
Discharge: HOME | End: 2021-03-05

## 2021-03-05 DIAGNOSIS — R79.89 ELEVATED D-DIMER: ICD-10-CM

## 2021-03-05 DIAGNOSIS — R06.02 SOB (SHORTNESS OF BREATH): ICD-10-CM

## 2021-03-05 PROCEDURE — A9540 TC99M MAA: HCPCS | Performed by: INTERNAL MEDICINE

## 2021-03-05 PROCEDURE — 78580 LUNG PERFUSION IMAGING: CPT

## 2021-03-05 PROCEDURE — 71045 X-RAY EXAM CHEST 1 VIEW: CPT

## 2021-03-05 PROCEDURE — 0 TECHNETIUM ALBUMIN AGGREGATED: Performed by: INTERNAL MEDICINE

## 2021-03-05 RX ADMIN — KIT FOR THE PREPARATION OF TECHNETIUM TC 99M ALBUMIN AGGREGATED 1 DOSE: 2.5 INJECTION, POWDER, FOR SOLUTION INTRAVENOUS at 14:57

## 2021-03-08 RX ORDER — DULAGLUTIDE 0.75 MG/.5ML
0.75 INJECTION, SOLUTION SUBCUTANEOUS
Qty: 2 ML | Refills: 0 | Status: SHIPPED | OUTPATIENT
Start: 2021-03-08 | End: 2021-04-08 | Stop reason: SDUPTHER

## 2021-03-10 ENCOUNTER — OFFICE VISIT (OUTPATIENT)
Dept: ENDOCRINOLOGY | Facility: CLINIC | Age: 86
End: 2021-03-10

## 2021-03-10 VITALS
OXYGEN SATURATION: 97 % | RESPIRATION RATE: 20 BRPM | HEIGHT: 66 IN | WEIGHT: 194.6 LBS | BODY MASS INDEX: 31.27 KG/M2 | SYSTOLIC BLOOD PRESSURE: 124 MMHG | DIASTOLIC BLOOD PRESSURE: 72 MMHG | HEART RATE: 84 BPM | TEMPERATURE: 97.3 F

## 2021-03-10 DIAGNOSIS — E11.65 UNCONTROLLED TYPE 2 DIABETES MELLITUS WITH HYPERGLYCEMIA (HCC): Primary | ICD-10-CM

## 2021-03-10 PROCEDURE — 99214 OFFICE O/P EST MOD 30 MIN: CPT | Performed by: INTERNAL MEDICINE

## 2021-03-10 RX ORDER — SYRING-NEEDL,DISP,INSUL,0.3 ML 30 GX5/16"
SYRINGE, EMPTY DISPOSABLE MISCELLANEOUS
COMMUNITY
Start: 2021-02-14 | End: 2021-03-10

## 2021-03-10 RX ORDER — SYRINGE-NEEDLE,INSULIN,0.5 ML 30 GX5/16"
SYRINGE, EMPTY DISPOSABLE MISCELLANEOUS
COMMUNITY
Start: 2021-01-26 | End: 2021-03-10 | Stop reason: SDUPTHER

## 2021-03-10 RX ORDER — SYRINGE-NEEDLE,INSULIN,0.5 ML 30 GX5/16"
SYRINGE, EMPTY DISPOSABLE MISCELLANEOUS
Qty: 100 EACH | Refills: 3 | Status: SHIPPED | OUTPATIENT
Start: 2021-03-10 | End: 2021-12-06

## 2021-03-10 RX ORDER — INSULIN GLARGINE 100 [IU]/ML
INJECTION, SOLUTION SUBCUTANEOUS
Qty: 20 ML | Refills: 5 | Status: SHIPPED | OUTPATIENT
Start: 2021-03-10 | End: 2022-03-31 | Stop reason: SDUPTHER

## 2021-03-10 RX ORDER — METFORMIN HYDROCHLORIDE 500 MG/1
500 TABLET, EXTENDED RELEASE ORAL 2 TIMES DAILY
Qty: 60 TABLET | Refills: 5 | Status: SHIPPED | OUTPATIENT
Start: 2021-03-10 | End: 2022-07-19 | Stop reason: SDUPTHER

## 2021-03-10 RX ORDER — METFORMIN HYDROCHLORIDE 500 MG/1
500 TABLET, EXTENDED RELEASE ORAL 2 TIMES DAILY
COMMUNITY
End: 2021-03-10 | Stop reason: SDUPTHER

## 2021-03-10 RX ORDER — INSULIN GLARGINE 100 [IU]/ML
INJECTION, SOLUTION SUBCUTANEOUS
COMMUNITY
Start: 2021-01-23 | End: 2021-03-10 | Stop reason: SDUPTHER

## 2021-03-10 NOTE — ASSESSMENT & PLAN NOTE
Deteriorated. Needs a plan for adjusting insulin  Which I gave him  He will check bg daily  He will resume metformin

## 2021-03-10 NOTE — PROGRESS NOTES
"     Office Note      Date: 03/10/2021  Patient Name: Blane Dietz  MRN: [id number]  : 1935    No chief complaint on file.      History of Present Illness:   Blane Dietz is a 85 y.o. male who presents for Diabetes-type 1.  He was on trulicity. Metformin and  jardiance and when  I last saw him, his a1c was 10.5 but htat was in 2020. Then due to the pandemic, he was unable to follow up. He saw dr esquivel  Last month and a1c was 12.9. He was put on lantus - 15 units and then increased to 25.. the metformin was stopped - he thinks but does not know why. His kidney function is normal.  bg checks - are done rarely.. even after adding insulin his  Blood sugars are in the 200-s when he checks them      Changes in health since last visit: in the last 6 months  He has not had to go to the ER . Last eye exam 4 months ag0 - no retinopathy was noted.  His creatinine was normal recently .    Subjective          Review of Systems:   Review of Systems   Endocrine: Positive for polydipsia and polyuria.       The following portions of the patient's history were reviewed and updated as appropriate: allergies, current medications, past family history, past medical history, past social history, past surgical history and problem list.    Objective     Visit Vitals  /72   Pulse 84   Temp 97.3 °F (36.3 °C) (Temporal)   Resp 20   Ht 167.6 cm (66\")   Wt 88.3 kg (194 lb 9.6 oz)   SpO2 97%   BMI 31.41 kg/m²       Labs:    CMP  Lab Results   Component Value Date    GLUCOSE 326 (H) 2020    BUN 15 2020    CREATININE 0.71 (L) 2020    EGFRIFNONA 106 2020    EGFRIFAFRI 95 10/14/2020    BCR 21.1 2020    K 3.8 2020    CO2 26.3 2020    CALCIUM 9.3 2020    PROTENTOTREF 6.8 10/14/2020    LABIL2 2.0 10/14/2020    AST 39 2020    ALT 43 (H) 2020        CBC w/DIFF  Lab Results   Component Value Date    WBC 5.56 2020    RBC 4.66 2020    HGB 13.9 2020    " "HCT 42.3 12/30/2020    MCV 90.8 12/30/2020    MCH 29.8 12/30/2020    MCHC 32.9 12/30/2020    RDW 14.1 12/30/2020    RDWSD 46.6 12/30/2020    MPV 9.7 12/30/2020     12/30/2020    NEUTRORELPCT 59.0 12/30/2020    LYMPHORELPCT 24.1 12/30/2020    MONORELPCT 8.6 12/30/2020    EOSRELPCT 4.7 12/30/2020    BASORELPCT 2.0 (H) 12/30/2020    AUTOIGPER 1.6 (H) 12/30/2020    NEUTROABS 3.28 12/30/2020    LYMPHSABS 1.34 12/30/2020    MONOSABS 0.48 12/30/2020    EOSABS 0.26 12/30/2020    BASOSABS 0.11 12/30/2020    AUTOIGNUM 0.09 (H) 12/30/2020    NRBC 0.0 12/30/2020       Physical Exam:  Physical Exam  Vitals reviewed.   Constitutional:       Appearance: Normal appearance.   Neurological:      Mental Status: He is alert and oriented to person, place, and time.   Psychiatric:         Mood and Affect: Mood normal.         Thought Content: Thought content normal.         Judgment: Judgment normal.          Assessment / Plan      Assessment & Plan:  Problem List Items Addressed This Visit        Other    Uncontrolled type 2 diabetes mellitus with hyperglycemia (CMS/[facility]) - Primary    Overview     Impression: 03/08/2016 - seeing Endo .;          Current Assessment & Plan     Deteriorated. Needs a plan for adjusting insulin  Which I gave him  He will check bg daily  He will resume metformin          Relevant Medications    Jardiance 10 MG tablet    Trulicity 0.75 MG/0.5ML solution pen-injector    TRUEplus Insulin Syringe 30G X 5/16\" 0.5 ML misc    Lantus 100 UNIT/ML injection    metFORMIN ER (GLUCOPHAGE-XR) 500 MG 24 hr tablet           Xavier Boston MD   03/10/2021  "

## 2021-03-28 ENCOUNTER — APPOINTMENT (OUTPATIENT)
Dept: CT IMAGING | Facility: HOSPITAL | Age: 86
End: 2021-03-28

## 2021-03-28 ENCOUNTER — HOSPITAL ENCOUNTER (INPATIENT)
Facility: HOSPITAL | Age: 86
LOS: 3 days | Discharge: HOME HEALTH | End: 2021-03-31
Attending: EMERGENCY MEDICINE | Admitting: INTERNAL MEDICINE

## 2021-03-28 ENCOUNTER — APPOINTMENT (OUTPATIENT)
Dept: GENERAL RADIOLOGY | Facility: HOSPITAL | Age: 86
End: 2021-03-28

## 2021-03-28 DIAGNOSIS — Z78.9 IMPAIRED MOBILITY AND ADLS: ICD-10-CM

## 2021-03-28 DIAGNOSIS — R09.02 HYPOXIA: ICD-10-CM

## 2021-03-28 DIAGNOSIS — J18.9 PNEUMONIA OF LEFT LOWER LOBE DUE TO INFECTIOUS ORGANISM: ICD-10-CM

## 2021-03-28 DIAGNOSIS — Z74.09 IMPAIRED MOBILITY AND ADLS: ICD-10-CM

## 2021-03-28 DIAGNOSIS — I10 ELEVATED BLOOD PRESSURE READING WITH DIAGNOSIS OF HYPERTENSION: ICD-10-CM

## 2021-03-28 DIAGNOSIS — D72.829 LEUKOCYTOSIS, UNSPECIFIED TYPE: ICD-10-CM

## 2021-03-28 DIAGNOSIS — I26.99 OTHER ACUTE PULMONARY EMBOLISM WITHOUT ACUTE COR PULMONALE (HCC): Primary | ICD-10-CM

## 2021-03-28 DIAGNOSIS — R73.9 HYPERGLYCEMIA: ICD-10-CM

## 2021-03-28 PROBLEM — R79.89 ELEVATED LFTS: Status: ACTIVE | Noted: 2021-03-28

## 2021-03-28 PROBLEM — I16.0 HYPERTENSIVE URGENCY: Status: ACTIVE | Noted: 2021-03-28

## 2021-03-28 PROBLEM — E78.5 HLD (HYPERLIPIDEMIA): Status: ACTIVE | Noted: 2021-03-28

## 2021-03-28 PROBLEM — R35.0 URINARY FREQUENCY: Status: RESOLVED | Noted: 2018-03-30 | Resolved: 2021-03-28

## 2021-03-28 PROBLEM — J96.01 ACUTE RESPIRATORY FAILURE WITH HYPOXIA: Status: ACTIVE | Noted: 2021-03-28

## 2021-03-28 PROBLEM — N28.89 RIGHT KIDNEY MASS: Status: ACTIVE | Noted: 2021-03-28

## 2021-03-28 PROBLEM — R07.9 CHEST PAIN: Status: ACTIVE | Noted: 2021-03-28

## 2021-03-28 PROBLEM — E11.9 T2DM (TYPE 2 DIABETES MELLITUS) (HCC): Status: ACTIVE | Noted: 2021-03-28

## 2021-03-28 LAB
ALBUMIN SERPL-MCNC: 3.8 G/DL (ref 3.5–5.2)
ALBUMIN/GLOB SERPL: 1.4 G/DL
ALP SERPL-CCNC: 191 U/L (ref 39–117)
ALT SERPL W P-5'-P-CCNC: 45 U/L (ref 1–41)
ANION GAP SERPL CALCULATED.3IONS-SCNC: 12 MMOL/L (ref 5–15)
APTT PPP: 32.9 SECONDS (ref 50–95)
AST SERPL-CCNC: 47 U/L (ref 1–40)
BASOPHILS # BLD AUTO: 0.1 10*3/MM3 (ref 0–0.2)
BASOPHILS NFR BLD AUTO: 0.8 % (ref 0–1.5)
BILIRUB SERPL-MCNC: 0.9 MG/DL (ref 0–1.2)
BUN SERPL-MCNC: 15 MG/DL (ref 8–23)
BUN/CREAT SERPL: 14.7 (ref 7–25)
CALCIUM SPEC-SCNC: 8.2 MG/DL (ref 8.6–10.5)
CHLORIDE SERPL-SCNC: 105 MMOL/L (ref 98–107)
CO2 SERPL-SCNC: 23 MMOL/L (ref 22–29)
CREAT SERPL-MCNC: 1.02 MG/DL (ref 0.76–1.27)
D-LACTATE SERPL-SCNC: 1 MMOL/L (ref 0.5–2)
DEPRECATED RDW RBC AUTO: 44.9 FL (ref 37–54)
EOSINOPHIL # BLD AUTO: 0.33 10*3/MM3 (ref 0–0.4)
EOSINOPHIL NFR BLD AUTO: 2.5 % (ref 0.3–6.2)
ERYTHROCYTE [DISTWIDTH] IN BLOOD BY AUTOMATED COUNT: 13.9 % (ref 12.3–15.4)
GFR SERPL CREATININE-BSD FRML MDRD: 70 ML/MIN/1.73
GLOBULIN UR ELPH-MCNC: 2.8 GM/DL
GLUCOSE SERPL-MCNC: 211 MG/DL (ref 65–99)
HCT VFR BLD AUTO: 37.5 % (ref 37.5–51)
HGB BLD-MCNC: 12 G/DL (ref 13–17.7)
HOLD SPECIMEN: NORMAL
IMM GRANULOCYTES # BLD AUTO: 0.2 10*3/MM3 (ref 0–0.05)
IMM GRANULOCYTES NFR BLD AUTO: 1.5 % (ref 0–0.5)
INR PPP: 1.14 (ref 0.85–1.16)
LYMPHOCYTES # BLD AUTO: 1.13 10*3/MM3 (ref 0.7–3.1)
LYMPHOCYTES NFR BLD AUTO: 8.5 % (ref 19.6–45.3)
MCH RBC QN AUTO: 28.6 PG (ref 26.6–33)
MCHC RBC AUTO-ENTMCNC: 32 G/DL (ref 31.5–35.7)
MCV RBC AUTO: 89.3 FL (ref 79–97)
MONOCYTES # BLD AUTO: 0.97 10*3/MM3 (ref 0.1–0.9)
MONOCYTES NFR BLD AUTO: 7.3 % (ref 5–12)
NEUTROPHILS NFR BLD AUTO: 10.6 10*3/MM3 (ref 1.7–7)
NEUTROPHILS NFR BLD AUTO: 79.4 % (ref 42.7–76)
NRBC BLD AUTO-RTO: 0 /100 WBC (ref 0–0.2)
NT-PROBNP SERPL-MCNC: 2852 PG/ML (ref 0–1800)
PLATELET # BLD AUTO: 189 10*3/MM3 (ref 140–450)
PMV BLD AUTO: 9.5 FL (ref 6–12)
POTASSIUM SERPL-SCNC: 4 MMOL/L (ref 3.5–5.2)
PROCALCITONIN SERPL-MCNC: 0.11 NG/ML (ref 0–0.25)
PROT SERPL-MCNC: 6.6 G/DL (ref 6–8.5)
PROTHROMBIN TIME: 14.3 SECONDS (ref 11.5–14)
RBC # BLD AUTO: 4.2 10*6/MM3 (ref 4.14–5.8)
SODIUM SERPL-SCNC: 140 MMOL/L (ref 136–145)
TROPONIN T SERPL-MCNC: 0.01 NG/ML (ref 0–0.03)
UFH PPP CHRO-ACNC: 0.1 IU/ML (ref 0.3–0.7)
WBC # BLD AUTO: 13.33 10*3/MM3 (ref 3.4–10.8)
WHOLE BLOOD HOLD SPECIMEN: NORMAL
WHOLE BLOOD HOLD SPECIMEN: NORMAL

## 2021-03-28 PROCEDURE — 93005 ELECTROCARDIOGRAM TRACING: CPT | Performed by: EMERGENCY MEDICINE

## 2021-03-28 PROCEDURE — 84484 ASSAY OF TROPONIN QUANT: CPT

## 2021-03-28 PROCEDURE — 96366 THER/PROPH/DIAG IV INF ADDON: CPT

## 2021-03-28 PROCEDURE — 71275 CT ANGIOGRAPHY CHEST: CPT

## 2021-03-28 PROCEDURE — 0 IOPAMIDOL PER 1 ML: Performed by: EMERGENCY MEDICINE

## 2021-03-28 PROCEDURE — 25010000002 HEPARIN (PORCINE) PER 1000 UNITS: Performed by: EMERGENCY MEDICINE

## 2021-03-28 PROCEDURE — 96368 THER/DIAG CONCURRENT INF: CPT

## 2021-03-28 PROCEDURE — U0003 INFECTIOUS AGENT DETECTION BY NUCLEIC ACID (DNA OR RNA); SEVERE ACUTE RESPIRATORY SYNDROME CORONAVIRUS 2 (SARS-COV-2) (CORONAVIRUS DISEASE [COVID-19]), AMPLIFIED PROBE TECHNIQUE, MAKING USE OF HIGH THROUGHPUT TECHNOLOGIES AS DESCRIBED BY CMS-2020-01-R: HCPCS | Performed by: INTERNAL MEDICINE

## 2021-03-28 PROCEDURE — 86140 C-REACTIVE PROTEIN: CPT | Performed by: INTERNAL MEDICINE

## 2021-03-28 PROCEDURE — 99284 EMERGENCY DEPT VISIT MOD MDM: CPT

## 2021-03-28 PROCEDURE — 25010000002 HEPARIN (PORCINE) 25000-0.45 UT/250ML-% SOLUTION: Performed by: EMERGENCY MEDICINE

## 2021-03-28 PROCEDURE — 80053 COMPREHEN METABOLIC PANEL: CPT

## 2021-03-28 PROCEDURE — 85730 THROMBOPLASTIN TIME PARTIAL: CPT | Performed by: EMERGENCY MEDICINE

## 2021-03-28 PROCEDURE — 85610 PROTHROMBIN TIME: CPT | Performed by: EMERGENCY MEDICINE

## 2021-03-28 PROCEDURE — 85025 COMPLETE CBC W/AUTO DIFF WBC: CPT

## 2021-03-28 PROCEDURE — 85379 FIBRIN DEGRADATION QUANT: CPT | Performed by: INTERNAL MEDICINE

## 2021-03-28 PROCEDURE — 96376 TX/PRO/DX INJ SAME DRUG ADON: CPT

## 2021-03-28 PROCEDURE — 99223 1ST HOSP IP/OBS HIGH 75: CPT | Performed by: INTERNAL MEDICINE

## 2021-03-28 PROCEDURE — 84145 PROCALCITONIN (PCT): CPT | Performed by: EMERGENCY MEDICINE

## 2021-03-28 PROCEDURE — 71045 X-RAY EXAM CHEST 1 VIEW: CPT

## 2021-03-28 PROCEDURE — 83605 ASSAY OF LACTIC ACID: CPT | Performed by: EMERGENCY MEDICINE

## 2021-03-28 PROCEDURE — 83880 ASSAY OF NATRIURETIC PEPTIDE: CPT

## 2021-03-28 PROCEDURE — 85520 HEPARIN ASSAY: CPT | Performed by: EMERGENCY MEDICINE

## 2021-03-28 PROCEDURE — 87040 BLOOD CULTURE FOR BACTERIA: CPT | Performed by: EMERGENCY MEDICINE

## 2021-03-28 PROCEDURE — 96365 THER/PROPH/DIAG IV INF INIT: CPT

## 2021-03-28 RX ORDER — HEPARIN SODIUM 1000 [USP'U]/ML
40 INJECTION, SOLUTION INTRAVENOUS; SUBCUTANEOUS AS NEEDED
Status: DISCONTINUED | OUTPATIENT
Start: 2021-03-29 | End: 2021-03-28

## 2021-03-28 RX ORDER — NITROGLYCERIN 0.4 MG/1
0.4 TABLET SUBLINGUAL
Status: DISCONTINUED | OUTPATIENT
Start: 2021-03-28 | End: 2021-03-31 | Stop reason: HOSPADM

## 2021-03-28 RX ORDER — HEPARIN SODIUM 1000 [USP'U]/ML
80 INJECTION, SOLUTION INTRAVENOUS; SUBCUTANEOUS ONCE
Status: COMPLETED | OUTPATIENT
Start: 2021-03-28 | End: 2021-03-28

## 2021-03-28 RX ORDER — NITROGLYCERIN 20 MG/100ML
10-50 INJECTION INTRAVENOUS
Status: DISCONTINUED | OUTPATIENT
Start: 2021-03-28 | End: 2021-03-29

## 2021-03-28 RX ORDER — HEPARIN SODIUM 10000 [USP'U]/100ML
17.4 INJECTION, SOLUTION INTRAVENOUS
Status: DISPENSED | OUTPATIENT
Start: 2021-03-28 | End: 2021-03-29

## 2021-03-28 RX ORDER — SODIUM CHLORIDE 0.9 % (FLUSH) 0.9 %
10 SYRINGE (ML) INJECTION AS NEEDED
Status: DISCONTINUED | OUTPATIENT
Start: 2021-03-28 | End: 2021-03-31 | Stop reason: HOSPADM

## 2021-03-28 RX ORDER — HEPARIN SODIUM 1000 [USP'U]/ML
80 INJECTION, SOLUTION INTRAVENOUS; SUBCUTANEOUS AS NEEDED
Status: DISCONTINUED | OUTPATIENT
Start: 2021-03-29 | End: 2021-03-28

## 2021-03-28 RX ADMIN — HEPARIN SODIUM 6900 UNITS: 1000 INJECTION, SOLUTION INTRAVENOUS; SUBCUTANEOUS at 22:30

## 2021-03-28 RX ADMIN — HEPARIN SODIUM 17.4 UNITS/KG/HR: 10000 INJECTION, SOLUTION INTRAVENOUS at 22:30

## 2021-03-28 RX ADMIN — IOPAMIDOL 75 ML: 755 INJECTION, SOLUTION INTRAVENOUS at 21:51

## 2021-03-28 RX ADMIN — NITROGLYCERIN 0.4 MG: 0.4 TABLET SUBLINGUAL at 22:03

## 2021-03-29 ENCOUNTER — APPOINTMENT (OUTPATIENT)
Dept: CARDIOLOGY | Facility: HOSPITAL | Age: 86
End: 2021-03-29

## 2021-03-29 ENCOUNTER — APPOINTMENT (OUTPATIENT)
Dept: CT IMAGING | Facility: HOSPITAL | Age: 86
End: 2021-03-29

## 2021-03-29 ENCOUNTER — TELEPHONE (OUTPATIENT)
Dept: ONCOLOGY | Facility: CLINIC | Age: 86
End: 2021-03-29

## 2021-03-29 ENCOUNTER — APPOINTMENT (OUTPATIENT)
Dept: ULTRASOUND IMAGING | Facility: HOSPITAL | Age: 86
End: 2021-03-29

## 2021-03-29 LAB
ANION GAP SERPL CALCULATED.3IONS-SCNC: 10 MMOL/L (ref 5–15)
BASOPHILS # BLD AUTO: 0.14 10*3/MM3 (ref 0–0.2)
BASOPHILS NFR BLD AUTO: 1 % (ref 0–1.5)
BUN SERPL-MCNC: 13 MG/DL (ref 8–23)
BUN/CREAT SERPL: 13.7 (ref 7–25)
CA-I SERPL ISE-MCNC: 1.22 MMOL/L (ref 1.12–1.32)
CALCIUM SPEC-SCNC: 8 MG/DL (ref 8.6–10.5)
CHLORIDE SERPL-SCNC: 106 MMOL/L (ref 98–107)
CO2 SERPL-SCNC: 24 MMOL/L (ref 22–29)
CREAT SERPL-MCNC: 0.95 MG/DL (ref 0.76–1.27)
CRP SERPL-MCNC: 5.77 MG/DL (ref 0–0.5)
D DIMER PPP FEU-MCNC: 0.1 MCGFEU/ML (ref 0–0.56)
DEPRECATED RDW RBC AUTO: 46 FL (ref 37–54)
EOSINOPHIL # BLD AUTO: 0.2 10*3/MM3 (ref 0–0.4)
EOSINOPHIL NFR BLD AUTO: 1.5 % (ref 0.3–6.2)
ERYTHROCYTE [DISTWIDTH] IN BLOOD BY AUTOMATED COUNT: 13.9 % (ref 12.3–15.4)
GFR SERPL CREATININE-BSD FRML MDRD: 76 ML/MIN/1.73
GLUCOSE BLDC GLUCOMTR-MCNC: 146 MG/DL (ref 70–130)
GLUCOSE BLDC GLUCOMTR-MCNC: 182 MG/DL (ref 70–130)
GLUCOSE BLDC GLUCOMTR-MCNC: 228 MG/DL (ref 70–130)
GLUCOSE BLDC GLUCOMTR-MCNC: 298 MG/DL (ref 70–130)
GLUCOSE SERPL-MCNC: 145 MG/DL (ref 65–99)
HBA1C MFR BLD: 11.8 % (ref 4.8–5.6)
HCT VFR BLD AUTO: 36.1 % (ref 37.5–51)
HGB BLD-MCNC: 11.2 G/DL (ref 13–17.7)
IMM GRANULOCYTES # BLD AUTO: 0.21 10*3/MM3 (ref 0–0.05)
IMM GRANULOCYTES NFR BLD AUTO: 1.6 % (ref 0–0.5)
L PNEUMO1 AG UR QL IA: NEGATIVE
LYMPHOCYTES # BLD AUTO: 1.81 10*3/MM3 (ref 0.7–3.1)
LYMPHOCYTES NFR BLD AUTO: 13.5 % (ref 19.6–45.3)
MCH RBC QN AUTO: 28.1 PG (ref 26.6–33)
MCHC RBC AUTO-ENTMCNC: 31 G/DL (ref 31.5–35.7)
MCV RBC AUTO: 90.5 FL (ref 79–97)
MONOCYTES # BLD AUTO: 1 10*3/MM3 (ref 0.1–0.9)
MONOCYTES NFR BLD AUTO: 7.5 % (ref 5–12)
NEUTROPHILS NFR BLD AUTO: 10.05 10*3/MM3 (ref 1.7–7)
NEUTROPHILS NFR BLD AUTO: 74.9 % (ref 42.7–76)
NRBC BLD AUTO-RTO: 0 /100 WBC (ref 0–0.2)
PLATELET # BLD AUTO: 188 10*3/MM3 (ref 140–450)
PMV BLD AUTO: 10.1 FL (ref 6–12)
POTASSIUM SERPL-SCNC: 3.9 MMOL/L (ref 3.5–5.2)
QT INTERVAL: 372 MS
QTC INTERVAL: 512 MS
RBC # BLD AUTO: 3.99 10*6/MM3 (ref 4.14–5.8)
S PNEUM AG SPEC QL LA: NEGATIVE
SARS-COV-2 RNA RESP QL NAA+PROBE: NOT DETECTED
SODIUM SERPL-SCNC: 140 MMOL/L (ref 136–145)
TSH SERPL DL<=0.05 MIU/L-ACNC: 0.9 UIU/ML (ref 0.27–4.2)
UFH PPP CHRO-ACNC: 0.57 IU/ML (ref 0.3–0.7)
UFH PPP CHRO-ACNC: 0.59 IU/ML (ref 0.3–0.7)
WBC # BLD AUTO: 13.41 10*3/MM3 (ref 3.4–10.8)

## 2021-03-29 PROCEDURE — 85025 COMPLETE CBC W/AUTO DIFF WBC: CPT | Performed by: INTERNAL MEDICINE

## 2021-03-29 PROCEDURE — 87899 AGENT NOS ASSAY W/OPTIC: CPT | Performed by: INTERNAL MEDICINE

## 2021-03-29 PROCEDURE — 80048 BASIC METABOLIC PNL TOTAL CA: CPT | Performed by: INTERNAL MEDICINE

## 2021-03-29 PROCEDURE — 76775 US EXAM ABDO BACK WALL LIM: CPT

## 2021-03-29 PROCEDURE — 0 IOPAMIDOL PER 1 ML: Performed by: INTERNAL MEDICINE

## 2021-03-29 PROCEDURE — 85520 HEPARIN ASSAY: CPT

## 2021-03-29 PROCEDURE — 25010000002 HEPARIN (PORCINE) 25000-0.45 UT/250ML-% SOLUTION: Performed by: INTERNAL MEDICINE

## 2021-03-29 PROCEDURE — 93306 TTE W/DOPPLER COMPLETE: CPT

## 2021-03-29 PROCEDURE — 99233 SBSQ HOSP IP/OBS HIGH 50: CPT | Performed by: INTERNAL MEDICINE

## 2021-03-29 PROCEDURE — 84443 ASSAY THYROID STIM HORMONE: CPT | Performed by: INTERNAL MEDICINE

## 2021-03-29 PROCEDURE — 63710000001 INSULIN LISPRO (HUMAN) PER 5 UNITS: Performed by: INTERNAL MEDICINE

## 2021-03-29 PROCEDURE — 74178 CT ABD&PLV WO CNTR FLWD CNTR: CPT

## 2021-03-29 PROCEDURE — 94799 UNLISTED PULMONARY SVC/PX: CPT

## 2021-03-29 PROCEDURE — 94660 CPAP INITIATION&MGMT: CPT

## 2021-03-29 PROCEDURE — 82330 ASSAY OF CALCIUM: CPT | Performed by: NURSE PRACTITIONER

## 2021-03-29 PROCEDURE — 87449 NOS EACH ORGANISM AG IA: CPT | Performed by: INTERNAL MEDICINE

## 2021-03-29 PROCEDURE — 82962 GLUCOSE BLOOD TEST: CPT

## 2021-03-29 PROCEDURE — 83036 HEMOGLOBIN GLYCOSYLATED A1C: CPT | Performed by: INTERNAL MEDICINE

## 2021-03-29 PROCEDURE — 63710000001 INSULIN DETEMIR PER 5 UNITS: Performed by: INTERNAL MEDICINE

## 2021-03-29 RX ORDER — FLUTICASONE PROPIONATE 50 MCG
2 SPRAY, SUSPENSION (ML) NASAL DAILY
Status: DISCONTINUED | OUTPATIENT
Start: 2021-03-29 | End: 2021-03-31 | Stop reason: HOSPADM

## 2021-03-29 RX ORDER — ACETAMINOPHEN 160 MG/5ML
650 SOLUTION ORAL EVERY 4 HOURS PRN
Status: DISCONTINUED | OUTPATIENT
Start: 2021-03-29 | End: 2021-03-31 | Stop reason: HOSPADM

## 2021-03-29 RX ORDER — METOPROLOL SUCCINATE 100 MG/1
100 TABLET, EXTENDED RELEASE ORAL DAILY
Status: DISCONTINUED | OUTPATIENT
Start: 2021-03-29 | End: 2021-03-31 | Stop reason: HOSPADM

## 2021-03-29 RX ORDER — METHOCARBAMOL 500 MG/1
500 TABLET, FILM COATED ORAL 3 TIMES DAILY PRN
Status: DISCONTINUED | OUTPATIENT
Start: 2021-03-29 | End: 2021-03-31 | Stop reason: HOSPADM

## 2021-03-29 RX ORDER — ASPIRIN 81 MG/1
81 TABLET ORAL DAILY
Status: DISCONTINUED | OUTPATIENT
Start: 2021-03-29 | End: 2021-03-31 | Stop reason: HOSPADM

## 2021-03-29 RX ORDER — SACCHAROMYCES BOULARDII 250 MG
250 CAPSULE ORAL DAILY
Status: DISCONTINUED | OUTPATIENT
Start: 2021-03-29 | End: 2021-03-31 | Stop reason: HOSPADM

## 2021-03-29 RX ORDER — SODIUM CHLORIDE 0.9 % (FLUSH) 0.9 %
10 SYRINGE (ML) INJECTION AS NEEDED
Status: DISCONTINUED | OUTPATIENT
Start: 2021-03-29 | End: 2021-03-31 | Stop reason: HOSPADM

## 2021-03-29 RX ORDER — ONDANSETRON 2 MG/ML
4 INJECTION INTRAMUSCULAR; INTRAVENOUS EVERY 6 HOURS PRN
Status: DISCONTINUED | OUTPATIENT
Start: 2021-03-29 | End: 2021-03-31 | Stop reason: HOSPADM

## 2021-03-29 RX ORDER — MELATONIN
2000 DAILY
Status: DISCONTINUED | OUTPATIENT
Start: 2021-03-29 | End: 2021-03-31 | Stop reason: HOSPADM

## 2021-03-29 RX ORDER — DEXTROSE MONOHYDRATE 25 G/50ML
25 INJECTION, SOLUTION INTRAVENOUS
Status: DISCONTINUED | OUTPATIENT
Start: 2021-03-29 | End: 2021-03-31 | Stop reason: HOSPADM

## 2021-03-29 RX ORDER — NICOTINE POLACRILEX 4 MG
15 LOZENGE BUCCAL
Status: DISCONTINUED | OUTPATIENT
Start: 2021-03-29 | End: 2021-03-31 | Stop reason: HOSPADM

## 2021-03-29 RX ORDER — AMLODIPINE BESYLATE 5 MG/1
5 TABLET ORAL DAILY
Status: DISCONTINUED | OUTPATIENT
Start: 2021-03-29 | End: 2021-03-31 | Stop reason: HOSPADM

## 2021-03-29 RX ORDER — TRIAMTERENE AND HYDROCHLOROTHIAZIDE 37.5; 25 MG/1; MG/1
1 TABLET ORAL DAILY
Status: DISCONTINUED | OUTPATIENT
Start: 2021-03-29 | End: 2021-03-31 | Stop reason: HOSPADM

## 2021-03-29 RX ORDER — ASPIRIN 81 MG/1
162 TABLET, CHEWABLE ORAL ONCE
Status: COMPLETED | OUTPATIENT
Start: 2021-03-29 | End: 2021-03-29

## 2021-03-29 RX ORDER — ALLOPURINOL 300 MG/1
300 TABLET ORAL DAILY
Status: DISCONTINUED | OUTPATIENT
Start: 2021-03-29 | End: 2021-03-31 | Stop reason: HOSPADM

## 2021-03-29 RX ORDER — DOXYCYCLINE 100 MG/1
100 CAPSULE ORAL EVERY 12 HOURS SCHEDULED
Status: DISCONTINUED | OUTPATIENT
Start: 2021-03-29 | End: 2021-03-31 | Stop reason: HOSPADM

## 2021-03-29 RX ORDER — SODIUM CHLORIDE 0.9 % (FLUSH) 0.9 %
10 SYRINGE (ML) INJECTION EVERY 12 HOURS SCHEDULED
Status: DISCONTINUED | OUTPATIENT
Start: 2021-03-29 | End: 2021-03-31 | Stop reason: HOSPADM

## 2021-03-29 RX ADMIN — Medication 250 MG: at 09:42

## 2021-03-29 RX ADMIN — ALLOPURINOL 300 MG: 300 TABLET ORAL at 09:41

## 2021-03-29 RX ADMIN — INSULIN LISPRO 3 UNITS: 100 INJECTION, SOLUTION INTRAVENOUS; SUBCUTANEOUS at 18:04

## 2021-03-29 RX ADMIN — Medication 2000 UNITS: at 09:33

## 2021-03-29 RX ADMIN — INSULIN DETEMIR 10 UNITS: 100 INJECTION, SOLUTION SUBCUTANEOUS at 21:24

## 2021-03-29 RX ADMIN — APIXABAN 10 MG: 5 TABLET, FILM COATED ORAL at 21:26

## 2021-03-29 RX ADMIN — FLUTICASONE PROPIONATE 2 SPRAY: 50 SPRAY, METERED NASAL at 09:30

## 2021-03-29 RX ADMIN — HEPARIN SODIUM 17.4 UNITS/KG/HR: 10000 INJECTION, SOLUTION INTRAVENOUS at 13:58

## 2021-03-29 RX ADMIN — ASPIRIN 81 MG: 81 TABLET, COATED ORAL at 09:32

## 2021-03-29 RX ADMIN — AMLODIPINE BESYLATE 5 MG: 5 TABLET ORAL at 09:42

## 2021-03-29 RX ADMIN — METOPROLOL SUCCINATE 100 MG: 100 TABLET, EXTENDED RELEASE ORAL at 09:41

## 2021-03-29 RX ADMIN — DOXYCYCLINE 100 MG: 100 INJECTION, POWDER, LYOPHILIZED, FOR SOLUTION INTRAVENOUS at 09:42

## 2021-03-29 RX ADMIN — IOPAMIDOL 95 ML: 755 INJECTION, SOLUTION INTRAVENOUS at 21:10

## 2021-03-29 RX ADMIN — LOSARTAN POTASSIUM: 50 TABLET, FILM COATED ORAL at 09:30

## 2021-03-29 RX ADMIN — DOXYCYCLINE 100 MG: 100 INJECTION, POWDER, LYOPHILIZED, FOR SOLUTION INTRAVENOUS at 01:45

## 2021-03-29 RX ADMIN — DOXYCYCLINE 100 MG: 100 CAPSULE ORAL at 21:26

## 2021-03-29 RX ADMIN — SODIUM CHLORIDE, PRESERVATIVE FREE 10 ML: 5 INJECTION INTRAVENOUS at 02:00

## 2021-03-29 RX ADMIN — AZTREONAM 2 G: 2 INJECTION, POWDER, LYOPHILIZED, FOR SOLUTION INTRAMUSCULAR; INTRAVENOUS at 00:24

## 2021-03-29 RX ADMIN — ASPIRIN 162 MG: 81 TABLET, CHEWABLE ORAL at 01:46

## 2021-03-29 RX ADMIN — AZTREONAM 2 G: 2 INJECTION, POWDER, LYOPHILIZED, FOR SOLUTION INTRAMUSCULAR; INTRAVENOUS at 05:38

## 2021-03-29 RX ADMIN — TRIAMTERENE AND HYDROCHLOROTHIAZIDE 1 TABLET: 37.5; 25 TABLET ORAL at 09:30

## 2021-03-29 RX ADMIN — NITROGLYCERIN 10 MCG/MIN: 20 INJECTION INTRAVENOUS at 00:05

## 2021-03-29 RX ADMIN — SODIUM CHLORIDE, PRESERVATIVE FREE 10 ML: 5 INJECTION INTRAVENOUS at 21:27

## 2021-03-29 RX ADMIN — SODIUM CHLORIDE, PRESERVATIVE FREE 10 ML: 5 INJECTION INTRAVENOUS at 09:43

## 2021-03-29 RX ADMIN — INSULIN LISPRO 2 UNITS: 100 INJECTION, SOLUTION INTRAVENOUS; SUBCUTANEOUS at 12:08

## 2021-03-29 NOTE — TELEPHONE ENCOUNTER
Caller: KEYLA BLANDON     Relationship to patient: DAUGHTER    Best call back number: [phone]    Chief complaint: PT IN HOSPITAL NEEDS TO CANCEL APTS ON 03/31/2021    Type of visit: INFUSION APT           Additional notes:PT WILL CALL BACK TO R/S THESE APT.

## 2021-03-30 LAB
ALBUMIN SERPL-MCNC: 4 G/DL (ref 3.5–5.2)
ALBUMIN/GLOB SERPL: 1.1 G/DL
ALP SERPL-CCNC: 225 U/L (ref 39–117)
ALT SERPL W P-5'-P-CCNC: 44 U/L (ref 1–41)
ANION GAP SERPL CALCULATED.3IONS-SCNC: 11 MMOL/L (ref 5–15)
AST SERPL-CCNC: 44 U/L (ref 1–40)
BACTERIA UR QL AUTO: ABNORMAL /HPF
BASOPHILS # BLD AUTO: 0.19 10*3/MM3 (ref 0–0.2)
BASOPHILS NFR BLD AUTO: 1.1 % (ref 0–1.5)
BH CV ECHO MEAS - AI DEC SLOPE: 159 CM/SEC^2
BH CV ECHO MEAS - AI MAX PG: 29.4 MMHG
BH CV ECHO MEAS - AI MAX VEL: 271 CM/SEC
BH CV ECHO MEAS - AI P1/2T: 499.2 MSEC
BH CV ECHO MEAS - AO MAX PG (FULL): 3.1 MMHG
BH CV ECHO MEAS - AO MAX PG: 6 MMHG
BH CV ECHO MEAS - AO MEAN PG (FULL): 1.7 MMHG
BH CV ECHO MEAS - AO MEAN PG: 2.7 MMHG
BH CV ECHO MEAS - AO ROOT AREA (BSA CORRECTED): 1.8
BH CV ECHO MEAS - AO ROOT AREA: 10.2 CM^2
BH CV ECHO MEAS - AO ROOT DIAM: 3.6 CM
BH CV ECHO MEAS - AO V2 MAX: 123.3 CM/SEC
BH CV ECHO MEAS - AO V2 MEAN: 79.9 CM/SEC
BH CV ECHO MEAS - AO V2 VTI: 21.6 CM
BH CV ECHO MEAS - AVA(I,A): 2.6 CM^2
BH CV ECHO MEAS - AVA(I,D): 2.6 CM^2
BH CV ECHO MEAS - AVA(V,A): 2.4 CM^2
BH CV ECHO MEAS - AVA(V,D): 2.4 CM^2
BH CV ECHO MEAS - BSA(HAYCOCK): 2 M^2
BH CV ECHO MEAS - BSA: 2 M^2
BH CV ECHO MEAS - BZI_BMI: 30.9 KILOGRAMS/M^2
BH CV ECHO MEAS - BZI_METRIC_HEIGHT: 167 CM
BH CV ECHO MEAS - BZI_METRIC_WEIGHT: 86.2 KG
BH CV ECHO MEAS - EDV(CUBED): 91.1 ML
BH CV ECHO MEAS - EDV(MOD-SP2): 174 ML
BH CV ECHO MEAS - EDV(MOD-SP4): 124 ML
BH CV ECHO MEAS - EDV(TEICH): 92.4 ML
BH CV ECHO MEAS - EF(CUBED): 48.8 %
BH CV ECHO MEAS - EF(MOD-BP): 49.7 %
BH CV ECHO MEAS - EF(MOD-SP2): 55.1 %
BH CV ECHO MEAS - EF(MOD-SP4): 47.3 %
BH CV ECHO MEAS - EF(TEICH): 41.1 %
BH CV ECHO MEAS - ESV(CUBED): 46.7 ML
BH CV ECHO MEAS - ESV(MOD-SP2): 78.2 ML
BH CV ECHO MEAS - ESV(MOD-SP4): 65.3 ML
BH CV ECHO MEAS - ESV(TEICH): 54.4 ML
BH CV ECHO MEAS - FS: 20 %
BH CV ECHO MEAS - IVS/LVPW: 1.3
BH CV ECHO MEAS - IVSD: 1.5 CM
BH CV ECHO MEAS - LA DIMENSION: 4.2 CM
BH CV ECHO MEAS - LA/AO: 1.2
BH CV ECHO MEAS - LAD MAJOR: 6.5 CM
BH CV ECHO MEAS - LAT PEAK E' VEL: 13.5 CM/SEC
BH CV ECHO MEAS - LATERAL E/E' RATIO: 8.1
BH CV ECHO MEAS - LV DIASTOLIC VOL/BSA (35-75): 63.5 ML/M^2
BH CV ECHO MEAS - LV MASS(C)D: 235.3 GRAMS
BH CV ECHO MEAS - LV MASS(C)DI: 120.6 GRAMS/M^2
BH CV ECHO MEAS - LV MAX PG: 2.9 MMHG
BH CV ECHO MEAS - LV MEAN PG: 1 MMHG
BH CV ECHO MEAS - LV SYSTOLIC VOL/BSA (12-30): 33.5 ML/M^2
BH CV ECHO MEAS - LV V1 MAX: 85 CM/SEC
BH CV ECHO MEAS - LV V1 MEAN: 51.8 CM/SEC
BH CV ECHO MEAS - LV V1 VTI: 16.4 CM
BH CV ECHO MEAS - LVIDD: 4.5 CM
BH CV ECHO MEAS - LVIDS: 3.6 CM
BH CV ECHO MEAS - LVLD AP2: 9.8 CM
BH CV ECHO MEAS - LVLD AP4: 9.9 CM
BH CV ECHO MEAS - LVLS AP2: 8.3 CM
BH CV ECHO MEAS - LVLS AP4: 9 CM
BH CV ECHO MEAS - LVOT AREA (M): 3.5 CM^2
BH CV ECHO MEAS - LVOT AREA: 3.5 CM^2
BH CV ECHO MEAS - LVOT DIAM: 2.1 CM
BH CV ECHO MEAS - LVPWD: 1.2 CM
BH CV ECHO MEAS - MED PEAK E' VEL: 4.7 CM/SEC
BH CV ECHO MEAS - MEDIAL E/E' RATIO: 23.5
BH CV ECHO MEAS - MV A MAX VEL: 94.8 CM/SEC
BH CV ECHO MEAS - MV DEC SLOPE: 602.5 CM/SEC^2
BH CV ECHO MEAS - MV DEC TIME: 0.14 SEC
BH CV ECHO MEAS - MV E MAX VEL: 110 CM/SEC
BH CV ECHO MEAS - MV E/A: 1.2
BH CV ECHO MEAS - MV MAX PG: 4.8 MMHG
BH CV ECHO MEAS - MV MEAN PG: 2 MMHG
BH CV ECHO MEAS - MV P1/2T MAX VEL: 107 CM/SEC
BH CV ECHO MEAS - MV P1/2T: 52 MSEC
BH CV ECHO MEAS - MV V2 MAX: 110 CM/SEC
BH CV ECHO MEAS - MV V2 MEAN: 74.1 CM/SEC
BH CV ECHO MEAS - MV V2 VTI: 23.8 CM
BH CV ECHO MEAS - MVA P1/2T LCG: 2.1 CM^2
BH CV ECHO MEAS - MVA(P1/2T): 4.2 CM^2
BH CV ECHO MEAS - MVA(VTI): 2.4 CM^2
BH CV ECHO MEAS - PA ACC TIME: 0.12 SEC
BH CV ECHO MEAS - PA MAX PG: 8.5 MMHG
BH CV ECHO MEAS - PA MEAN PG: 4 MMHG
BH CV ECHO MEAS - PA PR(ACCEL): 26.8 MMHG
BH CV ECHO MEAS - PA V2 MAX: 145.7 CM/SEC
BH CV ECHO MEAS - PA V2 MEAN: 89.4 CM/SEC
BH CV ECHO MEAS - PA V2 VTI: 27.5 CM
BH CV ECHO MEAS - RAP SYSTOLE: 3 MMHG
BH CV ECHO MEAS - RVSP: 41 MMHG
BH CV ECHO MEAS - SI(AO): 112.8 ML/M^2
BH CV ECHO MEAS - SI(CUBED): 22.8 ML/M^2
BH CV ECHO MEAS - SI(LVOT): 29.1 ML/M^2
BH CV ECHO MEAS - SI(MOD-SP2): 49.1 ML/M^2
BH CV ECHO MEAS - SI(MOD-SP4): 30.1 ML/M^2
BH CV ECHO MEAS - SI(TEICH): 19.5 ML/M^2
BH CV ECHO MEAS - SV(AO): 220.2 ML
BH CV ECHO MEAS - SV(CUBED): 44.5 ML
BH CV ECHO MEAS - SV(LVOT): 56.8 ML
BH CV ECHO MEAS - SV(MOD-SP2): 95.8 ML
BH CV ECHO MEAS - SV(MOD-SP4): 58.7 ML
BH CV ECHO MEAS - SV(TEICH): 38 ML
BH CV ECHO MEAS - TAPSE (>1.6): 1.9 CM
BH CV ECHO MEAS - TR MAX PG: 38 MMHG
BH CV ECHO MEAS - TR MAX VEL: 296.8 CM/SEC
BH CV ECHO MEASUREMENTS AVERAGE E/E' RATIO: 12.09
BH CV VAS BP LEFT ARM: NORMAL MMHG
BH CV XLRA - TDI S': 12.1 CM/SEC
BILIRUB SERPL-MCNC: 0.9 MG/DL (ref 0–1.2)
BILIRUB UR QL STRIP: NEGATIVE
BUN SERPL-MCNC: 13 MG/DL (ref 8–23)
BUN/CREAT SERPL: 14.1 (ref 7–25)
CALCIUM SPEC-SCNC: 9.1 MG/DL (ref 8.6–10.5)
CHLORIDE SERPL-SCNC: 104 MMOL/L (ref 98–107)
CLARITY UR: CLEAR
CO2 SERPL-SCNC: 27 MMOL/L (ref 22–29)
COLOR UR: YELLOW
CREAT SERPL-MCNC: 0.92 MG/DL (ref 0.76–1.27)
DEPRECATED RDW RBC AUTO: 47.5 FL (ref 37–54)
EOSINOPHIL # BLD AUTO: 0.27 10*3/MM3 (ref 0–0.4)
EOSINOPHIL NFR BLD AUTO: 1.6 % (ref 0.3–6.2)
ERYTHROCYTE [DISTWIDTH] IN BLOOD BY AUTOMATED COUNT: 14.1 % (ref 12.3–15.4)
GFR SERPL CREATININE-BSD FRML MDRD: 79 ML/MIN/1.73
GLOBULIN UR ELPH-MCNC: 3.5 GM/DL
GLUCOSE BLDC GLUCOMTR-MCNC: 120 MG/DL (ref 70–130)
GLUCOSE BLDC GLUCOMTR-MCNC: 181 MG/DL (ref 70–130)
GLUCOSE BLDC GLUCOMTR-MCNC: 232 MG/DL (ref 70–130)
GLUCOSE BLDC GLUCOMTR-MCNC: 314 MG/DL (ref 70–130)
GLUCOSE SERPL-MCNC: 177 MG/DL (ref 65–99)
GLUCOSE UR STRIP-MCNC: ABNORMAL MG/DL
HCT VFR BLD AUTO: 41.8 % (ref 37.5–51)
HGB BLD-MCNC: 12.8 G/DL (ref 13–17.7)
HGB UR QL STRIP.AUTO: ABNORMAL
HYALINE CASTS UR QL AUTO: ABNORMAL /LPF
IMM GRANULOCYTES # BLD AUTO: 0.31 10*3/MM3 (ref 0–0.05)
IMM GRANULOCYTES NFR BLD AUTO: 1.9 % (ref 0–0.5)
KETONES UR QL STRIP: NEGATIVE
LEFT ATRIUM VOLUME INDEX: 27.1 ML/M^2
LEFT ATRIUM VOLUME: 52.9 ML
LEUKOCYTE ESTERASE UR QL STRIP.AUTO: NEGATIVE
LYMPHOCYTES # BLD AUTO: 2.11 10*3/MM3 (ref 0.7–3.1)
LYMPHOCYTES NFR BLD AUTO: 12.7 % (ref 19.6–45.3)
MAXIMAL PREDICTED HEART RATE: 137 BPM
MCH RBC QN AUTO: 28.3 PG (ref 26.6–33)
MCHC RBC AUTO-ENTMCNC: 30.6 G/DL (ref 31.5–35.7)
MCV RBC AUTO: 92.5 FL (ref 79–97)
MONOCYTES # BLD AUTO: 1.29 10*3/MM3 (ref 0.1–0.9)
MONOCYTES NFR BLD AUTO: 7.8 % (ref 5–12)
NEUTROPHILS NFR BLD AUTO: 12.41 10*3/MM3 (ref 1.7–7)
NEUTROPHILS NFR BLD AUTO: 74.9 % (ref 42.7–76)
NITRITE UR QL STRIP: NEGATIVE
NRBC BLD AUTO-RTO: 0 /100 WBC (ref 0–0.2)
PH UR STRIP.AUTO: 6 [PH] (ref 5–8)
PLATELET # BLD AUTO: 252 10*3/MM3 (ref 140–450)
PMV BLD AUTO: 9.7 FL (ref 6–12)
POTASSIUM SERPL-SCNC: 4 MMOL/L (ref 3.5–5.2)
PROT SERPL-MCNC: 7.5 G/DL (ref 6–8.5)
PROT UR QL STRIP: NEGATIVE
RBC # BLD AUTO: 4.52 10*6/MM3 (ref 4.14–5.8)
RBC # UR: ABNORMAL /HPF
REF LAB TEST METHOD: ABNORMAL
SODIUM SERPL-SCNC: 142 MMOL/L (ref 136–145)
SP GR UR STRIP: 1.01 (ref 1–1.03)
SQUAMOUS #/AREA URNS HPF: ABNORMAL /HPF
STRESS TARGET HR: 116 BPM
UROBILINOGEN UR QL STRIP: ABNORMAL
WBC # BLD AUTO: 16.58 10*3/MM3 (ref 3.4–10.8)
WBC UR QL AUTO: ABNORMAL /HPF

## 2021-03-30 PROCEDURE — 87449 NOS EACH ORGANISM AG IA: CPT | Performed by: INTERNAL MEDICINE

## 2021-03-30 PROCEDURE — 86738 MYCOPLASMA ANTIBODY: CPT | Performed by: INTERNAL MEDICINE

## 2021-03-30 PROCEDURE — 81001 URINALYSIS AUTO W/SCOPE: CPT | Performed by: INTERNAL MEDICINE

## 2021-03-30 PROCEDURE — 63710000001 INSULIN DETEMIR PER 5 UNITS: Performed by: INTERNAL MEDICINE

## 2021-03-30 PROCEDURE — 87385 HISTOPLASMA CAPSUL AG IA: CPT | Performed by: INTERNAL MEDICINE

## 2021-03-30 PROCEDURE — G0108 DIAB MANAGE TRN  PER INDIV: HCPCS

## 2021-03-30 PROCEDURE — 94799 UNLISTED PULMONARY SVC/PX: CPT

## 2021-03-30 PROCEDURE — 85025 COMPLETE CBC W/AUTO DIFF WBC: CPT | Performed by: INTERNAL MEDICINE

## 2021-03-30 PROCEDURE — 63710000001 INSULIN LISPRO (HUMAN) PER 5 UNITS: Performed by: INTERNAL MEDICINE

## 2021-03-30 PROCEDURE — 99232 SBSQ HOSP IP/OBS MODERATE 35: CPT | Performed by: INTERNAL MEDICINE

## 2021-03-30 PROCEDURE — 87305 ASPERGILLUS AG IA: CPT | Performed by: INTERNAL MEDICINE

## 2021-03-30 PROCEDURE — 82962 GLUCOSE BLOOD TEST: CPT

## 2021-03-30 PROCEDURE — 80053 COMPREHEN METABOLIC PANEL: CPT | Performed by: INTERNAL MEDICINE

## 2021-03-30 RX ADMIN — INSULIN LISPRO 2 UNITS: 100 INJECTION, SOLUTION INTRAVENOUS; SUBCUTANEOUS at 08:24

## 2021-03-30 RX ADMIN — SODIUM CHLORIDE, PRESERVATIVE FREE 10 ML: 5 INJECTION INTRAVENOUS at 20:08

## 2021-03-30 RX ADMIN — LOSARTAN POTASSIUM: 50 TABLET, FILM COATED ORAL at 08:22

## 2021-03-30 RX ADMIN — ALLOPURINOL 300 MG: 300 TABLET ORAL at 08:23

## 2021-03-30 RX ADMIN — INSULIN LISPRO 3 UNITS: 100 INJECTION, SOLUTION INTRAVENOUS; SUBCUTANEOUS at 11:58

## 2021-03-30 RX ADMIN — AMLODIPINE BESYLATE 5 MG: 5 TABLET ORAL at 08:23

## 2021-03-30 RX ADMIN — TRIAMTERENE AND HYDROCHLOROTHIAZIDE 1 TABLET: 37.5; 25 TABLET ORAL at 08:23

## 2021-03-30 RX ADMIN — Medication 250 MG: at 08:23

## 2021-03-30 RX ADMIN — METOPROLOL SUCCINATE 100 MG: 100 TABLET, EXTENDED RELEASE ORAL at 08:23

## 2021-03-30 RX ADMIN — DOXYCYCLINE 100 MG: 100 CAPSULE ORAL at 20:08

## 2021-03-30 RX ADMIN — ASPIRIN 81 MG: 81 TABLET, COATED ORAL at 08:23

## 2021-03-30 RX ADMIN — DOXYCYCLINE 100 MG: 100 CAPSULE ORAL at 08:23

## 2021-03-30 RX ADMIN — INSULIN DETEMIR 10 UNITS: 100 INJECTION, SOLUTION SUBCUTANEOUS at 20:08

## 2021-03-30 RX ADMIN — Medication 2000 UNITS: at 08:23

## 2021-03-30 RX ADMIN — FLUTICASONE PROPIONATE 2 SPRAY: 50 SPRAY, METERED NASAL at 08:24

## 2021-03-30 RX ADMIN — APIXABAN 10 MG: 5 TABLET, FILM COATED ORAL at 20:08

## 2021-03-30 RX ADMIN — APIXABAN 10 MG: 5 TABLET, FILM COATED ORAL at 08:25

## 2021-03-31 VITALS
OXYGEN SATURATION: 96 % | BODY MASS INDEX: 31.02 KG/M2 | SYSTOLIC BLOOD PRESSURE: 139 MMHG | WEIGHT: 193 LBS | DIASTOLIC BLOOD PRESSURE: 70 MMHG | HEIGHT: 66 IN | HEART RATE: 91 BPM | TEMPERATURE: 98.3 F | RESPIRATION RATE: 18 BRPM

## 2021-03-31 PROBLEM — I16.0 HYPERTENSIVE URGENCY: Status: RESOLVED | Noted: 2021-03-28 | Resolved: 2021-03-31

## 2021-03-31 PROBLEM — R07.9 CHEST PAIN: Status: RESOLVED | Noted: 2021-03-28 | Resolved: 2021-03-31

## 2021-03-31 LAB
ANION GAP SERPL CALCULATED.3IONS-SCNC: 9 MMOL/L (ref 5–15)
BASOPHILS # BLD AUTO: 0.16 10*3/MM3 (ref 0–0.2)
BASOPHILS NFR BLD AUTO: 1.3 % (ref 0–1.5)
BUN SERPL-MCNC: 22 MG/DL (ref 8–23)
BUN/CREAT SERPL: 24.4 (ref 7–25)
CALCIUM SPEC-SCNC: 8.3 MG/DL (ref 8.6–10.5)
CHLORIDE SERPL-SCNC: 105 MMOL/L (ref 98–107)
CO2 SERPL-SCNC: 27 MMOL/L (ref 22–29)
CREAT SERPL-MCNC: 0.9 MG/DL (ref 0.76–1.27)
CRP SERPL-MCNC: 6.65 MG/DL (ref 0–0.5)
DEPRECATED RDW RBC AUTO: 48 FL (ref 37–54)
EOSINOPHIL # BLD AUTO: 0.52 10*3/MM3 (ref 0–0.4)
EOSINOPHIL NFR BLD AUTO: 4.2 % (ref 0.3–6.2)
ERYTHROCYTE [DISTWIDTH] IN BLOOD BY AUTOMATED COUNT: 14.1 % (ref 12.3–15.4)
GFR SERPL CREATININE-BSD FRML MDRD: 81 ML/MIN/1.73
GLUCOSE BLDC GLUCOMTR-MCNC: 142 MG/DL (ref 70–130)
GLUCOSE BLDC GLUCOMTR-MCNC: 326 MG/DL (ref 70–130)
GLUCOSE SERPL-MCNC: 168 MG/DL (ref 65–99)
HCT VFR BLD AUTO: 39.1 % (ref 37.5–51)
HGB BLD-MCNC: 11.9 G/DL (ref 13–17.7)
IMM GRANULOCYTES # BLD AUTO: 0.27 10*3/MM3 (ref 0–0.05)
IMM GRANULOCYTES NFR BLD AUTO: 2.2 % (ref 0–0.5)
LYMPHOCYTES # BLD AUTO: 2.05 10*3/MM3 (ref 0.7–3.1)
LYMPHOCYTES NFR BLD AUTO: 16.7 % (ref 19.6–45.3)
M PNEUMO IGG SER IA-ACNC: 265 U/ML (ref 0–99)
M PNEUMO IGM SER IA-ACNC: <770 U/ML (ref 0–769)
MCH RBC QN AUTO: 28.5 PG (ref 26.6–33)
MCHC RBC AUTO-ENTMCNC: 30.4 G/DL (ref 31.5–35.7)
MCV RBC AUTO: 93.5 FL (ref 79–97)
MONOCYTES # BLD AUTO: 1.01 10*3/MM3 (ref 0.1–0.9)
MONOCYTES NFR BLD AUTO: 8.2 % (ref 5–12)
NEUTROPHILS NFR BLD AUTO: 67.4 % (ref 42.7–76)
NEUTROPHILS NFR BLD AUTO: 8.27 10*3/MM3 (ref 1.7–7)
NRBC BLD AUTO-RTO: 0 /100 WBC (ref 0–0.2)
PLATELET # BLD AUTO: 238 10*3/MM3 (ref 140–450)
PMV BLD AUTO: 9.4 FL (ref 6–12)
POTASSIUM SERPL-SCNC: 3.5 MMOL/L (ref 3.5–5.2)
RBC # BLD AUTO: 4.18 10*6/MM3 (ref 4.14–5.8)
SODIUM SERPL-SCNC: 141 MMOL/L (ref 136–145)
WBC # BLD AUTO: 12.28 10*3/MM3 (ref 3.4–10.8)

## 2021-03-31 PROCEDURE — 82962 GLUCOSE BLOOD TEST: CPT

## 2021-03-31 PROCEDURE — 85025 COMPLETE CBC W/AUTO DIFF WBC: CPT | Performed by: INTERNAL MEDICINE

## 2021-03-31 PROCEDURE — 94799 UNLISTED PULMONARY SVC/PX: CPT

## 2021-03-31 PROCEDURE — 99239 HOSP IP/OBS DSCHRG MGMT >30: CPT | Performed by: INTERNAL MEDICINE

## 2021-03-31 PROCEDURE — 80048 BASIC METABOLIC PNL TOTAL CA: CPT | Performed by: INTERNAL MEDICINE

## 2021-03-31 PROCEDURE — 86140 C-REACTIVE PROTEIN: CPT | Performed by: INTERNAL MEDICINE

## 2021-03-31 RX ORDER — DOXYCYCLINE 100 MG/1
100 CAPSULE ORAL EVERY 12 HOURS SCHEDULED
Qty: 10 CAPSULE | Refills: 0 | Status: SHIPPED | OUTPATIENT
Start: 2021-03-31 | End: 2021-04-05

## 2021-03-31 RX ORDER — LOSARTAN POTASSIUM AND HYDROCHLOROTHIAZIDE 12.5; 5 MG/1; MG/1
1 TABLET ORAL DAILY
Qty: 30 TABLET | Refills: 1 | Status: SHIPPED | OUTPATIENT
Start: 2021-03-31 | End: 2021-04-28 | Stop reason: DRUGHIGH

## 2021-03-31 RX ADMIN — Medication 250 MG: at 08:24

## 2021-03-31 RX ADMIN — DOXYCYCLINE 100 MG: 100 CAPSULE ORAL at 08:24

## 2021-03-31 RX ADMIN — METOPROLOL SUCCINATE 100 MG: 100 TABLET, EXTENDED RELEASE ORAL at 08:24

## 2021-03-31 RX ADMIN — ASPIRIN 81 MG: 81 TABLET, COATED ORAL at 08:28

## 2021-03-31 RX ADMIN — APIXABAN 10 MG: 5 TABLET, FILM COATED ORAL at 08:23

## 2021-03-31 RX ADMIN — LOSARTAN POTASSIUM: 50 TABLET, FILM COATED ORAL at 08:24

## 2021-03-31 RX ADMIN — ALLOPURINOL 300 MG: 300 TABLET ORAL at 08:24

## 2021-03-31 RX ADMIN — AMLODIPINE BESYLATE 5 MG: 5 TABLET ORAL at 08:24

## 2021-03-31 RX ADMIN — Medication 2000 UNITS: at 08:23

## 2021-03-31 RX ADMIN — FLUTICASONE PROPIONATE 2 SPRAY: 50 SPRAY, METERED NASAL at 08:26

## 2021-03-31 RX ADMIN — TRIAMTERENE AND HYDROCHLOROTHIAZIDE 1 TABLET: 37.5; 25 TABLET ORAL at 08:24

## 2021-04-01 ENCOUNTER — READMISSION MANAGEMENT (OUTPATIENT)
Dept: CALL CENTER | Facility: HOSPITAL | Age: 86
End: 2021-04-01

## 2021-04-01 ENCOUNTER — OFFICE VISIT (OUTPATIENT)
Dept: UROLOGY | Facility: CLINIC | Age: 86
End: 2021-04-01

## 2021-04-01 VITALS
WEIGHT: 188 LBS | HEIGHT: 67 IN | SYSTOLIC BLOOD PRESSURE: 130 MMHG | OXYGEN SATURATION: 98 % | TEMPERATURE: 98.4 F | BODY MASS INDEX: 29.51 KG/M2 | DIASTOLIC BLOOD PRESSURE: 68 MMHG | HEART RATE: 100 BPM

## 2021-04-01 DIAGNOSIS — N28.89 RIGHT KIDNEY MASS: Primary | ICD-10-CM

## 2021-04-01 LAB
1,3 BETA GLUCAN SER-MCNC: <31 PG/ML
H CAPSUL AG UR QL IA: <0.5
Lab: NORMAL
MVISTA(R) BLASTOMYCES AG: NORMAL NG/ML
SPECIMEN SOURCE: NORMAL

## 2021-04-01 PROCEDURE — 99204 OFFICE O/P NEW MOD 45 MIN: CPT | Performed by: UROLOGY

## 2021-04-01 RX ORDER — DOXYCYCLINE HYCLATE 100 MG/1
100 CAPSULE ORAL 2 TIMES DAILY
COMMUNITY
End: 2021-04-01

## 2021-04-01 RX ORDER — SODIUM CHLORIDE, SODIUM LACTATE, POTASSIUM CHLORIDE, CALCIUM CHLORIDE 600; 310; 30; 20 MG/100ML; MG/100ML; MG/100ML; MG/100ML
100 INJECTION, SOLUTION INTRAVENOUS CONTINUOUS
Status: CANCELLED | OUTPATIENT
Start: 2021-04-01

## 2021-04-01 NOTE — PROGRESS NOTES
Chief Complaint  Chief Complaint   Patient presents with   • renal mass        HPI  Mr. Dietz is a 85 y.o. male with below past medical history who presents with a mass on his kidney discovered on a CT scan. The CT was performed while he was in the hospital for the last 3 days due to a blood clot in his right lung with possible pneumonia. He denies hematuria.   .    Past Medical History  Past Medical History:   Diagnosis Date   • Aortic stenosis     status post ROSS procedure, remote, with December 2015 echocardiography revealing normal aortic and pulmonary valve function, normal ejection fraction and mild to moderate MR   • Arthritis    • Bilateral impacted cerumen 11/23/2016   • Chronic gout of right foot 6/13/2016    Impression: 03/08/2016 - stable.;    • Depression    • Diabetes mellitus (CMS/Formerly [facility])    • Diverticulitis    • Exogenous obesity    • Gout    • Heart murmur    • History of vitamin D deficiency    • Hypercholesteremia 8/11/2016   • Hyperlipidemia    • Hypertension    • Malignant neoplasm of prostate (CMS/Formerly [facility])    • Morbid obesity (CMS/Formerly [facility]) 8/11/2016   • Primary osteoarthritis involving multiple joints 6/13/2016    Impression: 03/08/2016 - stable;    • Sleep apnea    • Uncontrolled type 2 diabetes mellitus with hyperglycemia (CMS/HCC) 6/13/2016    Impression: 03/08/2016 - seeing Endo .;    • Vertigo        Past Surgical History  Past Surgical History:   Procedure Laterality Date   • CARDIAC VALVE REPLACEMENT     • COLON SURGERY     • COLONOSCOPY     • COLOSTOMY  1999   • COLOSTOMY REVISION     • EXPLORATORY LAPAROTOMY      With Tissue Removal   • LIPOMA EXCISION     • MOHS SURGERY     • OTHER SURGICAL HISTORY      Porcine Valve   • PROSTATE SURGERY     • PROSTATECTOMY  2000     History of Prostatectomy Perineal Radical   • SKIN CANCER EXCISION      from head and lip   • TONSILLECTOMY         Medications    Current Outpatient Medications:   •  allopurinol (ZYLOPRIM) 300 MG tablet, TAKE 1 TABLET BY MOUTH  ONE TIME A DAY , Disp: 90 tablet, Rfl: 0  •  amLODIPine (NORVASC) 5 MG tablet, Take 1 tablet by mouth Daily., Disp: 30 tablet, Rfl: 1  •  apixaban (ELIQUIS) 5 MG tablet tablet, Take 2 tablets by mouth every 12 hours x5 days then decrease to 1 tablet by mouth every 12 hours starting on 4/6/21., Disp: 70 tablet, Rfl: 1  •  aspirin 81 MG EC tablet, Take 81 mg by mouth Daily., Disp: , Rfl:   •  CRISTINA CONTOUR TEST test strip, , Disp: , Rfl:   •  Cholecalciferol (VITAMIN D3) 2000 UNITS capsule, Take  by mouth 2 (two) times a day., Disp: , Rfl:   •  Cinnamon 500 MG capsule, Take 2,000 mg by mouth Daily., Disp: , Rfl:   •  Coenzyme Q10 100 MG tablet, Take 2 tablets by mouth Daily., Disp: , Rfl:   •  doxycycline (MONODOX) 100 MG capsule, Take 1 capsule by mouth Every 12 (Twelve) Hours for 10 doses for Pneumonia, Disp: 10 capsule, Rfl: 0  •  fenofibric acid (TRILIPIX) 135 MG capsule delayed-release delayed release capsule, TAKE 1 CAPSULE BY MOUTH ONE TIME A DAY , Disp: 30 capsule, Rfl: 2  •  fluticasone (FLONASE) 50 MCG/ACT nasal spray, 2 sprays into the nostril(s) as directed by provider Daily. 2 puffs each nostril, Disp: 1 bottle, Rfl: 0  •  Jardiance 10 MG tablet, TAKE 1 TABLET BY MOUTH EVERY DAY , Disp: 30 tablet, Rfl: 3  •  l-methylfolate-algae-B6-B12 (METANX) 3-90.314-2-35 MG capsule capsule, TAKE 1 CAPSULE BY MOUTH TWICE A DAY., Disp: 180 capsule, Rfl: 1  •  Lantus 100 UNIT/ML injection, Up to 50 units daily, Disp: 20 mL, Rfl: 5  •  Lecithin 1200 MG capsule, Take  by mouth Daily., Disp: , Rfl:   •  losartan-hydrochlorothiazide (Hyzaar) 50-12.5 MG per tablet, Take 1 tablet by mouth Daily., Disp: 30 tablet, Rfl: 1  •  metFORMIN ER (GLUCOPHAGE-XR) 500 MG 24 hr tablet, Take 1 tablet by mouth 2 (Two) Times a Day., Disp: 60 tablet, Rfl: 5  •  methocarbamol (ROBAXIN) 500 MG tablet, Take 1 tablet by mouth 3 (Three) Times a Day As Needed for Muscle Spasms., Disp: 30 tablet, Rfl: 0  •  metoprolol succinate XL (TOPROL-XL) 100 MG  "24 hr tablet, TAKE 1 TABLET BY MOUTH EVERY DAY , Disp: 90 tablet, Rfl: 0  •  Multiple Vitamins-Minerals (PRESERVISION AREDS 2+MULTI VIT PO), Take  by mouth., Disp: , Rfl:   •  mupirocin (BACTROBAN) 2 % cream, mupirocin calcium 2 % topical cream, Disp: , Rfl:   •  nystatin (MYCOSTATIN) 780510 UNIT/GM powder, Apply  topically to the appropriate area as directed Daily As Needed (rash)., Disp: 60 g, Rfl: 0  •  pravastatin (PRAVACHOL) 40 MG tablet, TAKE ONE TABLET BY MOUTH EVERY NIGHT AT BEDTIME, Disp: 90 tablet, Rfl: 0  •  saccharomyces boulardii (Florastor) 250 MG capsule, Florastor 250 mg capsule, Disp: , Rfl:   •  triamterene-hydrochlorothiazide (DYAZIDE) 37.5-25 MG per capsule, TAKE ONE CAPSULE BY MOUTH DAILY, Disp: 90 capsule, Rfl: 2  •  TRUEplus Insulin Syringe 30G X 5/16\" 0.5 ML misc, For daily insulin injection, Disp: 100 each, Rfl: 3  •  Trulicity 0.75 MG/0.5ML solution pen-injector, Inject 0.75 mg under the skin into the appropriate area as directed Every 7 (Seven) Days for 30 days., Disp: 2 mL, Rfl: 0  No current facility-administered medications for this visit.    Allergies  Allergies   Allergen Reactions   • Ampicillin Anaphylaxis   • Medrol [Methylprednisolone] Unknown - High Severity     Made his blood sugar get really high, can't take this   • Myrbetriq [Mirabegron] Unknown - High Severity     High BP   • Penicillins Anaphylaxis   • Bee Venom Swelling       Social History  Social History     Socioeconomic History   • Marital status: Single     Spouse name: Not on file   • Number of children: Not on file   • Years of education: Not on file   • Highest education level: Not on file   Tobacco Use   • Smoking status: Never Smoker   • Smokeless tobacco: Never Used   Substance and Sexual Activity   • Alcohol use: No   • Drug use: No   • Sexual activity: Defer       Family History  Family History   Problem Relation Age of Onset   • Diabetes Mother    • Lung cancer Mother    • Cancer Mother    • Heart disease " "Father    • Breast cancer Other    • Cancer Other    • Hypertension Other    • Migraines Other    • Obesity Other    • Diabetes Other        Review of Systems  Review of systems was notable for PE.    Physical Exam  Visit Vitals  /68   Pulse 100   Temp 98.4 °F (36.9 °C)   Ht 168.9 cm (66.5\")   Wt 85.3 kg (188 lb)   SpO2 98%   BMI 29.89 kg/m²     Physical exam was performed and was notable for normal habitus       Labs  Lab Results   Component Value Date    PSA 1.200 12/30/2020    PSA 0.776 09/23/2020    PSA 0.611 06/24/2020       Lab Results   Component Value Date    GLUCOSE 168 (H) 03/31/2021    CALCIUM 8.3 (L) 03/31/2021     03/31/2021    K 3.5 03/31/2021    CO2 27.0 03/31/2021     03/31/2021    BUN 22 03/31/2021    CREATININE 0.90 03/31/2021    EGFRIFAFRI 95 10/14/2020    EGFRIFNONA 81 03/31/2021    BCR 24.4 03/31/2021    ANIONGAP 9.0 03/31/2021       Lab Results   Component Value Date    WBC 12.28 (H) 03/31/2021    HGB 11.9 (L) 03/31/2021    HCT 39.1 03/31/2021    MCV 93.5 03/31/2021     03/31/2021       Brief Urine Lab Results  (Last result in the past 365 days)      Color   Clarity   Blood   Leuk Est   Nitrite   Protein   CREAT   Urine HCG        03/30/21 1414 Yellow Clear Small (1+) Negative Negative Negative                Radiologic Studies  CT Abdomen Pelvis With & Without Contrast    Result Date: 3/30/2021  Ill-defined soft tissue density and hypoenhancing or patchy enhancing area within the right hilar region of the right kidney appears to have soft tissue density and involvement concerning for soft tissue mass and/or inflammation given adjacent perinephric stranding without well-defined focal fluid collection within or adjacent. Considerations for pyelonephritis with developing inflammation involving the proximal renal collecting system versus malignancy with urological consultation recommended and considerations for urinary studies or tissue sampling if no resolution on close " interval follow-up.  D:  03/30/2021 E:  03/30/2021   This report was finalized on 3/30/2021 6:45 PM by Dr. Bertram Alvarado.      XR Ribs 2 View Right    Result Date: 1/20/2021  No acute parenchymal disease on the right. No displaced right-sided rib fracture present.  D:  01/20/2021 E:  01/20/2021  This report was finalized on 1/20/2021 4:14 PM by Dr. Shannan Juan MD.      NM Lung Scan Perfusion Particulate    Result Date: 3/5/2021  Low probability for pulmonary embolism.  This report was finalized on 3/5/2021 4:22 PM by Manuel Bradley.      US Renal Limited    Result Date: 3/29/2021  Suboptimal evaluation of soft tissue mass right renal parenchyma with mild-to-moderate hydronephrosis and a simple appearing renal cortical cyst. Given findings on recent CT angiogram chest dated 03/28/2021, recommend further evaluation with a dedicated CT renal protocol evaluation of the right kidney for underlying soft tissue mass possibility.   D:  03/29/2021 E:  03/29/2021  This report was finalized on 3/29/2021 4:39 PM by Dr. Bertram Alvarado.      XR Chest 1 View    Result Date: 3/28/2021  Chronic changes without acute process. Mild cardiomegaly. Signer Name: ARGENTINA AVILA MD  Signed: 3/28/2021 9:07 PM  Workstation Name: [facility]  [facility]    XR Chest 1 View    Result Date: 3/6/2021  The heart Is enlarged with slight prominence of the pulmonary vascularity. No evidence of acute parenchymal disease.  D:  03/05/2021 E:  03/05/2021  This report was finalized on 3/6/2021 11:37 AM by Dr. Shannan Juan MD.      CT Angiogram Chest    Result Date: 3/28/2021  1. 1. Acute pulmonary embolus in the right lower lobe. 2. Cardiac enlargement. 3. Groundglass opacities in the lungs could represent COVID 19 pneumonia, correlate clinically. 4 questionable mass right kidney. Recommend renal ultrasound. 5. mild left pleural effusion. 2.  No gross infiltrate.  Signer Name: Marni Ayoub MD  Signed: 3/28/2021 10:08 PM   Workstation Name: [id number]  [facility]    XR Abdomen KUB    Result Date: 1/20/2021  Stool scattered diffusely throughout the colon suggesting clinical presentation of constipation. Degenerative changes seen within the spine.  D:  01/20/2021 E:  01/20/2021  This report was finalized on 1/20/2021 4:14 PM by Dr. Shannan Juan MD.      XR Chest PA & Lateral    Result Date: 1/20/2021  Cardiac size enlarged with increased central vascular congestion, however no focal consolidation or pleural effusion.  D:  01/20/2021 E:  01/20/2021  This report was finalized on 1/20/2021 6:00 PM by Dr. Bertram Alvarado.            Assessment  Mr. Dietz is a 85 y.o. 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  This is a new diagnosis of uncertain prognosis.     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 Ramon.    Plan  1.  Schedule right ureteroscopy with biopsy, and possible fulguration, possible stent placement, on 05/19/2021. Risk factors associated with this surgery are decreased functional status, and new need for anticoagulation with recent pulmonary embolus.   2. We will coordinate with his PCP, Dr. Marte, to see if he can be bridged with Lovenox 1 week prior to surgery.    Scribed for Burton Jaimes MD by Zofia Colunga.  04/01/21   10:47 EDT    I have personally performed the services described in this document as scribed by the above individual, and it is both accurate and complete.  Burton Jaimes MD  4/3/2021  19:55 EDT      Burton Jaimes MD

## 2021-04-01 NOTE — OUTREACH NOTE
Prep Survey      Responses   [facility] patient discharged from?  [facility]   Is LACE score < 7 ?  No   Emergency Room discharge w/ pulse ox?  No   Eligibility  Readm Mgmt   Discharge diagnosis  Pulmonary embolism    Does the patient have one of the following disease processes/diagnoses(primary or secondary)?  Other   Does the patient have Home health ordered?  Yes   What is the Home health agency?   [facility]   Is there a DME ordered?  No   Prep survey completed?  Yes          Citlaly Encinas RN

## 2021-04-02 LAB
BACTERIA SPEC AEROBE CULT: NORMAL
BACTERIA SPEC AEROBE CULT: NORMAL
GALACTOMANNAN AG SPEC IA-ACNC: 0.04 INDEX (ref 0–0.49)

## 2021-04-05 ENCOUNTER — READMISSION MANAGEMENT (OUTPATIENT)
Dept: CALL CENTER | Facility: HOSPITAL | Age: 86
End: 2021-04-05

## 2021-04-05 NOTE — OUTREACH NOTE
Medical Week 1 Survey      Responses   [facility] patient discharged from?  [place]   Does the patient have one of the following disease processes/diagnoses(primary or secondary)?  Other   Week 1 attempt successful?  No   Unsuccessful attempts  Attempt 1          Kesha Jones RN   (2) assistive person

## 2021-04-06 ENCOUNTER — READMISSION MANAGEMENT (OUTPATIENT)
Dept: CALL CENTER | Facility: HOSPITAL | Age: 86
End: 2021-04-06

## 2021-04-06 NOTE — OUTREACH NOTE
Medical Week 1 Survey      Responses   [facility] patient discharged from?  [place]   Does the patient have one of the following disease processes/diagnoses(primary or secondary)?  Other   Week 1 attempt successful?  No   Unsuccessful attempts  Attempt 2          Madyson Shea RN

## 2021-04-08 ENCOUNTER — READMISSION MANAGEMENT (OUTPATIENT)
Dept: CALL CENTER | Facility: HOSPITAL | Age: 86
End: 2021-04-08

## 2021-04-08 RX ORDER — DULAGLUTIDE 0.75 MG/.5ML
0.75 INJECTION, SOLUTION SUBCUTANEOUS
Qty: 2 ML | Refills: 1 | Status: SHIPPED | OUTPATIENT
Start: 2021-04-08 | End: 2021-05-08

## 2021-04-08 NOTE — OUTREACH NOTE
Medical Week 2 Survey      Responses   [facility] patient discharged from?  [place]   Does the patient have one of the following disease processes/diagnoses(primary or secondary)?  Other   Week 2 attempt successful?  No   Unsuccessful attempts  Attempt 1          Laura Quick RN

## 2021-04-09 ENCOUNTER — READMISSION MANAGEMENT (OUTPATIENT)
Dept: CALL CENTER | Facility: HOSPITAL | Age: 86
End: 2021-04-09

## 2021-04-09 NOTE — OUTREACH NOTE
Medical Week 2 Survey      Responses   [facility] patient discharged from?  [place]   Does the patient have one of the following disease processes/diagnoses(primary or secondary)?  Other   Week 2 attempt successful?  No   Unsuccessful attempts  Attempt 2          Piper Ndiaye RN

## 2021-04-16 ENCOUNTER — READMISSION MANAGEMENT (OUTPATIENT)
Dept: CALL CENTER | Facility: HOSPITAL | Age: 86
End: 2021-04-16

## 2021-04-16 NOTE — OUTREACH NOTE
Medical Week 3 Survey      Responses   [facility] patient discharged from?  [place]   Does the patient have one of the following disease processes/diagnoses(primary or secondary)?  Other   Week 3 attempt successful?  No   Rescheduled  Revoked          Ashwini Atkinson RN

## 2021-04-21 ENCOUNTER — APPOINTMENT (OUTPATIENT)
Dept: ONCOLOGY | Facility: HOSPITAL | Age: 86
End: 2021-04-21

## 2021-04-27 ENCOUNTER — OFFICE VISIT (OUTPATIENT)
Dept: ENDOCRINOLOGY | Facility: CLINIC | Age: 86
End: 2021-04-27

## 2021-04-27 VITALS
BODY MASS INDEX: 30.37 KG/M2 | TEMPERATURE: 97.3 F | SYSTOLIC BLOOD PRESSURE: 130 MMHG | OXYGEN SATURATION: 95 % | DIASTOLIC BLOOD PRESSURE: 70 MMHG | WEIGHT: 189 LBS | HEART RATE: 100 BPM | HEIGHT: 66 IN

## 2021-04-27 DIAGNOSIS — E11.65 TYPE 2 DIABETES MELLITUS WITH HYPERGLYCEMIA, WITH LONG-TERM CURRENT USE OF INSULIN (HCC): Primary | Chronic | ICD-10-CM

## 2021-04-27 DIAGNOSIS — Z79.4 TYPE 2 DIABETES MELLITUS WITH HYPERGLYCEMIA, WITH LONG-TERM CURRENT USE OF INSULIN (HCC): Primary | Chronic | ICD-10-CM

## 2021-04-27 LAB
EXPIRATION DATE: NORMAL
HBA1C MFR BLD: 9.6 %
Lab: NORMAL

## 2021-04-27 PROCEDURE — 3046F HEMOGLOBIN A1C LEVEL >9.0%: CPT | Performed by: PHYSICIAN ASSISTANT

## 2021-04-27 PROCEDURE — 99213 OFFICE O/P EST LOW 20 MIN: CPT | Performed by: PHYSICIAN ASSISTANT

## 2021-04-27 PROCEDURE — 83036 HEMOGLOBIN GLYCOSYLATED A1C: CPT | Performed by: PHYSICIAN ASSISTANT

## 2021-04-28 ENCOUNTER — OFFICE VISIT (OUTPATIENT)
Dept: ONCOLOGY | Facility: CLINIC | Age: 86
End: 2021-04-28

## 2021-04-28 ENCOUNTER — INFUSION (OUTPATIENT)
Dept: ONCOLOGY | Facility: HOSPITAL | Age: 86
End: 2021-04-28

## 2021-04-28 VITALS
HEIGHT: 66 IN | OXYGEN SATURATION: 96 % | BODY MASS INDEX: 30.22 KG/M2 | TEMPERATURE: 97.1 F | DIASTOLIC BLOOD PRESSURE: 86 MMHG | WEIGHT: 188 LBS | RESPIRATION RATE: 20 BRPM | SYSTOLIC BLOOD PRESSURE: 176 MMHG | HEART RATE: 108 BPM

## 2021-04-28 DIAGNOSIS — C61 PROSTATE CANCER (HCC): Primary | ICD-10-CM

## 2021-04-28 DIAGNOSIS — R09.02 HYPOXIA: ICD-10-CM

## 2021-04-28 LAB
ALBUMIN SERPL-MCNC: 3.6 G/DL (ref 3.5–5.2)
ALBUMIN/GLOB SERPL: 1.1 G/DL
ALP SERPL-CCNC: 204 U/L (ref 39–117)
ALT SERPL W P-5'-P-CCNC: 27 U/L (ref 1–41)
ANION GAP SERPL CALCULATED.3IONS-SCNC: 10.3 MMOL/L (ref 5–15)
AST SERPL-CCNC: 27 U/L (ref 1–40)
BASOPHILS # BLD AUTO: 0.12 10*3/MM3 (ref 0–0.2)
BASOPHILS NFR BLD AUTO: 1.1 % (ref 0–1.5)
BILIRUB SERPL-MCNC: 0.6 MG/DL (ref 0–1.2)
BUN SERPL-MCNC: 13 MG/DL (ref 8–23)
BUN/CREAT SERPL: 12.7 (ref 7–25)
CALCIUM SPEC-SCNC: 9.2 MG/DL (ref 8.6–10.5)
CHLORIDE SERPL-SCNC: 98 MMOL/L (ref 98–107)
CO2 SERPL-SCNC: 29.7 MMOL/L (ref 22–29)
CREAT SERPL-MCNC: 1.02 MG/DL (ref 0.76–1.27)
DEPRECATED RDW RBC AUTO: 43.2 FL (ref 37–54)
EOSINOPHIL # BLD AUTO: 0.37 10*3/MM3 (ref 0–0.4)
EOSINOPHIL NFR BLD AUTO: 3.2 % (ref 0.3–6.2)
ERYTHROCYTE [DISTWIDTH] IN BLOOD BY AUTOMATED COUNT: 14 % (ref 12.3–15.4)
GFR SERPL CREATININE-BSD FRML MDRD: 70 ML/MIN/1.73
GLOBULIN UR ELPH-MCNC: 3.4 GM/DL
GLUCOSE SERPL-MCNC: 188 MG/DL (ref 65–99)
HCT VFR BLD AUTO: 35.8 % (ref 37.5–51)
HGB BLD-MCNC: 11.7 G/DL (ref 13–17.7)
IMM GRANULOCYTES # BLD AUTO: 0.1 10*3/MM3 (ref 0–0.05)
IMM GRANULOCYTES NFR BLD AUTO: 0.9 % (ref 0–0.5)
LYMPHOCYTES # BLD AUTO: 1.52 10*3/MM3 (ref 0.7–3.1)
LYMPHOCYTES NFR BLD AUTO: 13.3 % (ref 19.6–45.3)
MCH RBC QN AUTO: 27.7 PG (ref 26.6–33)
MCHC RBC AUTO-ENTMCNC: 32.7 G/DL (ref 31.5–35.7)
MCV RBC AUTO: 84.8 FL (ref 79–97)
MONOCYTES # BLD AUTO: 0.81 10*3/MM3 (ref 0.1–0.9)
MONOCYTES NFR BLD AUTO: 7.1 % (ref 5–12)
NEUTROPHILS NFR BLD AUTO: 74.4 % (ref 42.7–76)
NEUTROPHILS NFR BLD AUTO: 8.48 10*3/MM3 (ref 1.7–7)
NRBC BLD AUTO-RTO: 0 /100 WBC (ref 0–0.2)
PLATELET # BLD AUTO: 248 10*3/MM3 (ref 140–450)
PMV BLD AUTO: 8.7 FL (ref 6–12)
POTASSIUM SERPL-SCNC: 3.8 MMOL/L (ref 3.5–5.2)
PROT SERPL-MCNC: 7 G/DL (ref 6–8.5)
PSA SERPL-MCNC: 3.38 NG/ML (ref 0–4)
RBC # BLD AUTO: 4.22 10*6/MM3 (ref 4.14–5.8)
SODIUM SERPL-SCNC: 138 MMOL/L (ref 136–145)
WBC # BLD AUTO: 11.4 10*3/MM3 (ref 3.4–10.8)

## 2021-04-28 PROCEDURE — 84153 ASSAY OF PSA TOTAL: CPT

## 2021-04-28 PROCEDURE — 96402 CHEMO HORMON ANTINEOPL SQ/IM: CPT

## 2021-04-28 PROCEDURE — 99214 OFFICE O/P EST MOD 30 MIN: CPT | Performed by: INTERNAL MEDICINE

## 2021-04-28 PROCEDURE — 36415 COLL VENOUS BLD VENIPUNCTURE: CPT

## 2021-04-28 PROCEDURE — 85025 COMPLETE CBC W/AUTO DIFF WBC: CPT

## 2021-04-28 PROCEDURE — 25010000002 LEUPROLIDE 22.5 MG KIT: Performed by: INTERNAL MEDICINE

## 2021-04-28 PROCEDURE — 80053 COMPREHEN METABOLIC PANEL: CPT

## 2021-04-28 RX ORDER — LOSARTAN POTASSIUM AND HYDROCHLOROTHIAZIDE 25; 100 MG/1; MG/1
1 TABLET ORAL DAILY
COMMUNITY
End: 2022-06-10 | Stop reason: HOSPADM

## 2021-04-28 RX ADMIN — LEUPROLIDE ACETATE 22.5 MG: KIT SUBCUTANEOUS at 11:02

## 2021-04-28 NOTE — PROGRESS NOTES
DATE OF VISIT: 4/28/2021     REASON FOR VISIT:   1. Prostate cancer. Presented with stage I, [id number], PSA at diagnosis 3 status  post prostatectomy in 2000.  2. Relapsed disease with rise in PSA gradually over the last couple of years,  most recent PSA 7.1 ng/mL on 01/25/2016.      HISTORY OF PRESENT ILLNESS: The patient is a very pleasant 80-year-old  gentleman with past medical history significant for prostate cancer status post  radical prostatectomy in 2000. The patient never received adjuvant treatments,  neither radiation, GnRH or antiandrogen treatment. The patient's PSA started  to go up gradually over the last 2 years. Patient was referred to me on  06/13/2014 and I did restaging workup that came back negative including bone  scan, CAT scans chest, abdomen and pelvis. We elected to proceed with watchful  waiting. Patient is here today in scheduled followup visit.     SUBJECTIVE: Mr. Dietz is here today by himself.  Since last visit patient has been admitted to [facility] with shortness of breath and found to have pulmonary embolism as well as right kidney mass.  He is complaining of shortness of breath.    REVIEW OF SYSTEMS: All the other 9 systems are reviewed by me and negative  except what is mentioned in HPI and subjective.      PAST MEDICAL HISTORY/SOCIAL HISTORY/FAMILY HISTORY: Unchanged from my prior  documentation on 06/13/2014.       Current Outpatient Medications:   •  losartan-hydrochlorothiazide (HYZAAR) 100-25 MG per tablet, Take 1 tablet by mouth Daily., Disp: , Rfl:   •  allopurinol (ZYLOPRIM) 300 MG tablet, TAKE 1 TABLET BY MOUTH ONE TIME A DAY , Disp: 90 tablet, Rfl: 0  •  amLODIPine (NORVASC) 5 MG tablet, Take 1 tablet by mouth Daily., Disp: 30 tablet, Rfl: 1  •  apixaban (ELIQUIS) 5 MG tablet tablet, Take 2 tablets by mouth every 12 hours x5 days then decrease to 1 tablet by mouth every 12 hours starting on 4/6/21., Disp: 70 tablet, Rfl: 1  •  aspirin 81 MG EC tablet, Take 81 mg by  mouth Daily., Disp: , Rfl:   •  CRISTINA CONTOUR TEST test strip, , Disp: , Rfl:   •  Cholecalciferol (VITAMIN D3) 2000 UNITS capsule, Take  by mouth 2 (two) times a day., Disp: , Rfl:   •  Cinnamon 500 MG capsule, Take 2,000 mg by mouth Daily., Disp: , Rfl:   •  Coenzyme Q10 100 MG tablet, Take 2 tablets by mouth Daily., Disp: , Rfl:   •  fenofibric acid (TRILIPIX) 135 MG capsule delayed-release delayed release capsule, TAKE 1 CAPSULE BY MOUTH ONE TIME A DAY , Disp: 30 capsule, Rfl: 2  •  fluticasone (FLONASE) 50 MCG/ACT nasal spray, 2 sprays into the nostril(s) as directed by provider Daily. 2 puffs each nostril, Disp: 1 bottle, Rfl: 0  •  Jardiance 10 MG tablet, TAKE 1 TABLET BY MOUTH EVERY DAY , Disp: 30 tablet, Rfl: 3  •  l-methylfolate-algae-B6-B12 (METANX) 3-90.314-2-35 MG capsule capsule, TAKE 1 CAPSULE BY MOUTH TWICE A DAY., Disp: 180 capsule, Rfl: 1  •  Lantus 100 UNIT/ML injection, Up to 50 units daily, Disp: 20 mL, Rfl: 5  •  Lecithin 1200 MG capsule, Take  by mouth Daily., Disp: , Rfl:   •  metFORMIN ER (GLUCOPHAGE-XR) 500 MG 24 hr tablet, Take 1 tablet by mouth 2 (Two) Times a Day., Disp: 60 tablet, Rfl: 5  •  methocarbamol (ROBAXIN) 500 MG tablet, Take 1 tablet by mouth 3 (Three) Times a Day As Needed for Muscle Spasms., Disp: 30 tablet, Rfl: 0  •  metoprolol succinate XL (TOPROL-XL) 100 MG 24 hr tablet, TAKE 1 TABLET BY MOUTH EVERY DAY , Disp: 90 tablet, Rfl: 0  •  Multiple Vitamins-Minerals (PRESERVISION AREDS 2+MULTI VIT PO), Take  by mouth., Disp: , Rfl:   •  mupirocin (BACTROBAN) 2 % cream, mupirocin calcium 2 % topical cream, Disp: , Rfl:   •  nystatin (MYCOSTATIN) 677319 UNIT/GM powder, Apply  topically to the appropriate area as directed Daily As Needed (rash)., Disp: 60 g, Rfl: 0  •  pravastatin (PRAVACHOL) 40 MG tablet, TAKE ONE TABLET BY MOUTH EVERY NIGHT AT BEDTIME, Disp: 90 tablet, Rfl: 0  •  saccharomyces boulardii (Florastor) 250 MG capsule, Florastor 250 mg capsule, Disp: , Rfl:   •   "triamterene-hydrochlorothiazide (DYAZIDE) 37.5-25 MG per capsule, TAKE ONE CAPSULE BY MOUTH DAILY, Disp: 90 capsule, Rfl: 2  •  TRUEplus Insulin Syringe 30G X 5/16\" 0.5 ML misc, For daily insulin injection, Disp: 100 each, Rfl: 3  •  Trulicity 0.75 MG/0.5ML solution pen-injector, Inject 0.75 mg under the skin into the appropriate area as directed Every 7 (Seven) Days for 30 days., Disp: 2 mL, Rfl: 1    PHYSICAL EXAMINATION:   /86   Pulse 108   Temp 97.1 °F (36.2 °C) (Temporal)   Resp 20   Ht 167.6 cm (66\")   Wt 85.3 kg (188 lb)   SpO2 96%   BMI 30.34 kg/m²   ECOG1  GENERAL: Age appropriate. No acute distress.   HEENT: Head atraumatic, normocephalic.   NECK: Supple. No JVD. No lymphadenopathy.   LUNGS: Clear to auscultation bilaterally. No wheezing. No rhonchi.   HEART: Regular rate and rhythm. S1, S2, no murmurs.   ABDOMEN: Soft, nontender, nondistended. Bowel sounds positive. No  hepatosplenomegaly.   EXTREMITIES: No clubbing, cyanosis, or edema.   SKIN: No rashes. No purpura.   NEUROLOGIC: Awake and oriented x3. Strength 5 out of 5 in all muscle groups.     Office Visit on 04/27/2021   Component Date Value Ref Range Status   • Hemoglobin A1C 04/27/2021 9.6  % Final   • Lot Number 04/27/2021 [id number]   Final   • Expiration Date 04/27/2021 01/14/2023   Final        ASSESSMENT: The patient is a very pleasant 80-year-old gentleman with relapsed  prostate cancer.     PROBLEM LIST:  1. Prostate cancer, initially presented as E6gH4O9 status post radical  prostatectomy 2000.  A.  Patient is on Lupron plus Prolia  2.  Right-sided pulmonary embolisms:  A.  Currently on Eliquis twice a day  3.  Type 2 diabetes  4. Hhypertension  5. Hypercholesterolemia.  6. Erectile dysfunction.   7.  Osteopenia  8.  5 cm right kidney lesion    PLAN:  1. I did go over the blood work results in detail with the patient from December 2020 unfortunately his PSA did increase to 1.2.  I will hold off on adding apalutamide since " patient is getting ready to have surgery for right kidney lesion.    2. The patient will return to clinic in 3 months with repeat blood work as well as serum PSA.  We will go over final pathology report from right kidney mass at that point.  3. I will continue Viagra as needed for erectile dysfunction.   4.  He will continue with Mo and Trulicity subcu for type 2 diabetes.  5.  We'll continue pravastatin for hypercholesterolemia  6.  Patient need to have 5 year follow-up colonoscopy which would be due 11/2021.  7. We reviewed the potential side effects of the treatment including hot flashes, impotence, joint pain, decrease in bone density, mood or sleep disturbance, and asthenia.  8.  I'll continue the patient on Prolia 60 mg subcutaneous every 6 month.  His next dose will be due today June 2020.  We'll continue calcium with vitamin D daily.  9.  We will continue Eliquis 5 mg twice a day.  10.  The patient scheduled to have cystoscopy.  We will hold Eliquis 2 days prior to procedure and resume as soon as he is cleared by Dr. Jaimes.    Shannan Ramon MD    4/28/2021

## 2021-05-05 ENCOUNTER — TELEPHONE (OUTPATIENT)
Dept: ONCOLOGY | Facility: CLINIC | Age: 86
End: 2021-05-05

## 2021-05-05 DIAGNOSIS — R09.02 HYPOXIA: Primary | ICD-10-CM

## 2021-05-05 DIAGNOSIS — R79.81 LOW O2 SATURATION: ICD-10-CM

## 2021-05-05 DIAGNOSIS — C61 PROSTATE CANCER (HCC): ICD-10-CM

## 2021-05-05 DIAGNOSIS — G47.33 OBSTRUCTIVE SLEEP APNEA SYNDROME: ICD-10-CM

## 2021-05-05 NOTE — TELEPHONE ENCOUNTER
"Patient stated he felt [employer] were \"incompetent\".  [facility] was scheduled to bring patients O2 today but patient \"fired\" them.  I called [facility] and they do service the [facility]. Order and records including testing faxed. Patient notified and instructed to answer phone so [facility] can set up delivery.  [facility]  Phone: [phone]  Fax: [phone]  "

## 2021-05-05 NOTE — TELEPHONE ENCOUNTER
----- Message from Shelton Harden sent at 5/5/2021  1:56 PM EDT -----  Regarding: OXYGEN REF.  Contact: [phone]  WANTING TO SWITCH OXYGEN TO [facility] OXYGEN IN [place]. [phone]  PT FIRED [employer] TODAY.   IF I NEED TO FW THIS TO SOMEONE ELSE I WILL SEND IT TO THEM  THANKS   DAVID

## 2021-05-05 NOTE — TELEPHONE ENCOUNTER
Caller: DANUTA    Relationship:  [facility]    Best call back number: [phone]    Who are you requesting to speak with (clinical staff, provider,  specific staff member): DEXTER TIPTON STATED THAT MEDICARE DENIED OXYGEN TREATMENT FOR PATIENT. HIGH TOXICITY WAS NOT A REASON FOR OXYGEN AND DANUTA IS REQUESTING ANOTHER DIAGNOSIS TO REQUEST FOR TREATMENT.     PLEASE CALL TO DISCUSS. OFFICE CLOSES AT 5:00.

## 2021-05-05 NOTE — TELEPHONE ENCOUNTER
Caller: KYLIE    Relationship: [facility]    Best call back number: [phone]    What is the best time to reach you:    Who are you requesting to speak with (clinical staff, provider,  specific staff member): CLINICAL    Do you know the name of the person who called: DEXTER CARRINGTON    What was the call regarding: ORDER SENT TO OFFICE.   NEED QUALIFYING DX IN CHART NOTES. CURRENT DX WILL NOT WORK W/ MEDICARE.    OFFICE NEEDS PHYSICAL ADDRESS NOT PO BOX.    Do you require a callback: YES

## 2021-05-06 NOTE — TELEPHONE ENCOUNTER
Patients diagnosis is not covered by Medicare for oxygen.  Patient states he wants to pay out of pocket and requested [employer] call him to set up payment and delivery.  [employer] will call him sean.

## 2021-05-12 ENCOUNTER — PRE-ADMISSION TESTING (OUTPATIENT)
Dept: PREADMISSION TESTING | Facility: HOSPITAL | Age: 86
End: 2021-05-12

## 2021-05-12 VITALS — HEIGHT: 67 IN | BODY MASS INDEX: 29.32 KG/M2 | WEIGHT: 186.8 LBS

## 2021-05-12 DIAGNOSIS — N28.89 RIGHT KIDNEY MASS: ICD-10-CM

## 2021-05-12 LAB
BACTERIA UR QL AUTO: ABNORMAL /HPF
BILIRUB UR QL STRIP: NEGATIVE
CLARITY UR: CLEAR
COLOR UR: YELLOW
GLUCOSE UR STRIP-MCNC: ABNORMAL MG/DL
HGB UR QL STRIP.AUTO: NEGATIVE
HYALINE CASTS UR QL AUTO: ABNORMAL /LPF
KETONES UR QL STRIP: NEGATIVE
LEUKOCYTE ESTERASE UR QL STRIP.AUTO: NEGATIVE
NITRITE UR QL STRIP: NEGATIVE
PH UR STRIP.AUTO: 5.5 [PH] (ref 5–8)
PROT UR QL STRIP: ABNORMAL
RBC # UR: ABNORMAL /HPF
REF LAB TEST METHOD: ABNORMAL
SP GR UR STRIP: 1.01 (ref 1–1.03)
SQUAMOUS #/AREA URNS HPF: ABNORMAL /HPF
UROBILINOGEN UR QL STRIP: ABNORMAL
WBC UR QL AUTO: ABNORMAL /HPF

## 2021-05-12 PROCEDURE — 81001 URINALYSIS AUTO W/SCOPE: CPT

## 2021-05-12 RX ORDER — DULAGLUTIDE 1.5 MG/.5ML
INJECTION, SOLUTION SUBCUTANEOUS WEEKLY
COMMUNITY
End: 2021-05-21 | Stop reason: SDUPTHER

## 2021-05-17 ENCOUNTER — APPOINTMENT (OUTPATIENT)
Dept: LAB | Facility: HOSPITAL | Age: 86
End: 2021-05-17

## 2021-05-21 RX ORDER — DULAGLUTIDE 1.5 MG/.5ML
0.75 INJECTION, SOLUTION SUBCUTANEOUS WEEKLY
Qty: 6 ML | Refills: 0 | Status: SHIPPED | OUTPATIENT
Start: 2021-05-21 | End: 2022-03-02

## 2021-05-24 ENCOUNTER — LAB (OUTPATIENT)
Dept: LAB | Facility: HOSPITAL | Age: 86
End: 2021-05-24

## 2021-05-24 DIAGNOSIS — Z01.818 PRE-OP TESTING: Primary | ICD-10-CM

## 2021-05-24 PROCEDURE — U0004 COV-19 TEST NON-CDC HGH THRU: HCPCS

## 2021-05-24 PROCEDURE — C9803 HOPD COVID-19 SPEC COLLECT: HCPCS

## 2021-05-24 NOTE — TELEPHONE ENCOUNTER
PATIENT CALLED TO CHECK ON THE METANX PRESCRIPTION. ADVISED THAT IT COULD TAKE 24-48 HOURS DUE TO DR. RAMÍREZ BEING IN CLINIC.      Ambulatory

## 2021-05-25 ENCOUNTER — TELEPHONE (OUTPATIENT)
Dept: UROLOGY | Facility: CLINIC | Age: 86
End: 2021-05-25

## 2021-05-25 LAB — SARS-COV-2 RNA NOSE QL NAA+PROBE: NOT DETECTED

## 2021-05-25 NOTE — TELEPHONE ENCOUNTER
If it was okay with them for him not to be doing Lovenox I am fine with that.  Otherwise, he needs to be doing the Lovenox.  3 days is sufficient to be off of Eliquis prior to surgery.

## 2021-05-25 NOTE — TELEPHONE ENCOUNTER
[facility] called as patient is on for surgery tomorrow. Your Note on 4-1-21 says to coordinate with pcp to bridge lovenox 1 week prior to surgery for history of PE. Patient states he has not been taking his eliquis for two days but has not done lovenox either. Daniela with [facility] concerned that patient is confused on his medicines. Left a message with -pcp office to call me back. Need to see if they advised patient to not take lovenox or if they were just unaware of surgery and the lovenox need to bridge lovenox x 1week. Will patient be ok to proceed with surgery tomorrow not having any lovenox therapy?   DENISE Posey

## 2021-05-25 NOTE — TELEPHONE ENCOUNTER
Yes that is fine.  Please make sure the patient is aware of the risks with not being bridged to Lovenox i.e. more blood clots.

## 2021-05-25 NOTE — TELEPHONE ENCOUNTER
I'm waiting for 's office to call me back. I am not sure they are even aware. Patient just stated he had not been doing any other injections besides insulin. So is it ok to proceed with surgery tomorrow either way as long as he has been off eliquis?  Kalpesh ,A

## 2021-06-04 ENCOUNTER — OFFICE VISIT (OUTPATIENT)
Dept: UROLOGY | Facility: CLINIC | Age: 86
End: 2021-06-04

## 2021-06-04 VITALS
OXYGEN SATURATION: 95 % | HEIGHT: 67 IN | HEART RATE: 111 BPM | RESPIRATION RATE: 18 BRPM | BODY MASS INDEX: 29.19 KG/M2 | WEIGHT: 186 LBS

## 2021-06-04 DIAGNOSIS — N28.89 RIGHT KIDNEY MASS: Primary | ICD-10-CM

## 2021-06-04 PROCEDURE — 99214 OFFICE O/P EST MOD 30 MIN: CPT | Performed by: UROLOGY

## 2021-06-04 RX ORDER — SODIUM CHLORIDE, SODIUM LACTATE, POTASSIUM CHLORIDE, CALCIUM CHLORIDE 600; 310; 30; 20 MG/100ML; MG/100ML; MG/100ML; MG/100ML
100 INJECTION, SOLUTION INTRAVENOUS CONTINUOUS
Status: CANCELLED | OUTPATIENT
Start: 2021-06-04

## 2021-06-04 NOTE — PROGRESS NOTES
Chief Complaint  Chief Complaint   Patient presents with   • Follow-up        HPI  Mr. Dietz is a 85 y.o. male with below past medical history who presents with a mass on his kidney discovered on a CT scan. The CT was performed while he was in the hospital for the last 3 days due to a blood clot in his right lung with possible pneumonia. He denies hematuria.     There was a mixup prior to the patient's last scheduled biopsy, and he never got any Lovenox.  On further investigation, it seems that there is limited evidence to support doing a Lovenox bridge.  The patient denies any shortness of breath today.  He uses his oxygen at home but did not bring it in with him today.  He is only on 2 L.  This was ordered and started by Dr. Macias in the past.  Dr. 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    Past Medical History  Past Medical History:   Diagnosis Date   • Aortic stenosis     status post ROSS procedure, remote, with December 2015 echocardiography revealing normal aortic and pulmonary valve function, normal ejection fraction and mild to moderate MR   • Arthritis    • Bilateral impacted cerumen 11/23/2016   • Chronic gout of right foot 6/13/2016    Impression: 03/08/2016 - stable.;    • COVID-19 vaccine series completed 05/12/2021    Maderna 2nd dose 3/12/21.   • Depression    • Diabetes mellitus (CMS/[facility])    • Diverticulitis    • Exogenous obesity    • Gout    • Heart murmur    • History of vitamin D deficiency    • Hypercholesteremia 8/11/2016   • Hyperlipidemia    • Hypertension    • Malignant neoplasm of prostate (CMS/HCC)    • Morbid obesity (CMS/[facility]) 8/11/2016   • Pneumonia 05/12/2021   • Primary osteoarthritis involving multiple joints 6/13/2016    Impression: 03/08/2016 - stable;    • Pulmonary embolism, blood-clot, obstetric 05/12/2021 March, 21   • Sleep apnea 05/12/2021    C-Pap   • Uncontrolled type 2 diabetes mellitus with hyperglycemia (CMS/[facility]) 6/13/2016     Impression: 03/08/2016 - seeing Endo .;    • Vertigo        Past Surgical History  Past Surgical History:   Procedure Laterality Date   • CARDIAC VALVE REPLACEMENT  05/12/2021    Ross procedure 22 years ago   • COLON SURGERY     • COLONOSCOPY     • COLOSTOMY  1999   • COLOSTOMY REVISION     • EXPLORATORY LAPAROTOMY      With Tissue Removal   • LIPOMA EXCISION     • MOHS SURGERY     • OTHER SURGICAL HISTORY      Porcine Valve   • PROSTATE SURGERY     • PROSTATECTOMY  2000     History of Prostatectomy Perineal Radical   • SKIN CANCER EXCISION      from head and lip   • TONSILLECTOMY         Medications    Current Outpatient Medications:   •  allopurinol (ZYLOPRIM) 300 MG tablet, TAKE 1 TABLET BY MOUTH ONE TIME A DAY  (Patient taking differently: Take 300 mg by mouth Daily.), Disp: 90 tablet, Rfl: 0  •  amLODIPine (NORVASC) 5 MG tablet, Take 1 tablet by mouth Daily., Disp: 30 tablet, Rfl: 1  •  apixaban (ELIQUIS) 5 MG tablet tablet, Take 2 tablets by mouth every 12 hours x5 days then decrease to 1 tablet by mouth every 12 hours starting on 4/6/21. (Patient taking differently: Take 5 mg by mouth Every 12 (Twelve) Hours. Take 2 tablets by mouth every 12 hours x5 days then decrease to 1 tablet by mouth every 12 hours starting on 4/6/21.), Disp: 70 tablet, Rfl: 1  •  aspirin 81 MG EC tablet, Take 81 mg by mouth Daily., Disp: , Rfl:   •  CRISTINA CONTOUR TEST test strip, , Disp: , Rfl:   •  Cholecalciferol (VITAMIN D3) 2000 UNITS capsule, Take  by mouth 2 (two) times a day., Disp: , Rfl:   •  Cinnamon 500 MG capsule, Take 2,000 mg by mouth Daily., Disp: , Rfl:   •  Coenzyme Q10 100 MG tablet, Take 2 tablets by mouth Daily., Disp: , Rfl:   •  Dulaglutide (Trulicity) 1.5 MG/0.5ML solution pen-injector, Inject 0.75 mg under the skin into the appropriate area as directed 1 (One) Time Per Week., Disp: 6 mL, Rfl: 0  •  fenofibric acid (TRILIPIX) 135 MG capsule delayed-release delayed release capsule, TAKE 1 CAPSULE BY MOUTH ONE TIME  A DAY , Disp: 30 capsule, Rfl: 2  •  fluticasone (FLONASE) 50 MCG/ACT nasal spray, 2 sprays into the nostril(s) as directed by provider Daily. 2 puffs each nostril (Patient taking differently: 2 sprays into the nostril(s) as directed by provider Daily As Needed. 2 puffs each nostril), Disp: 1 bottle, Rfl: 0  •  Jardiance 10 MG tablet, TAKE 1 TABLET BY MOUTH EVERY DAY  (Patient taking differently: Take  by mouth Daily.), Disp: 30 tablet, Rfl: 3  •  l-methylfolate-algae-B6-B12 (METANX) 3-90.314-2-35 MG capsule capsule, TAKE 1 CAPSULE BY MOUTH TWICE A DAY. (Patient taking differently: Take 1 capsule by mouth Daily As Needed.), Disp: 180 capsule, Rfl: 1  •  Lantus 100 UNIT/ML injection, Up to 50 units daily (Patient taking differently: Inject 25 Units under the skin into the appropriate area as directed Every Night. Up to 50 units daily), Disp: 20 mL, Rfl: 5  •  Lecithin 1200 MG capsule, Take  by mouth Daily., Disp: , Rfl:   •  leuprolide (LUPRON) 22.5 MG injection, Inject 22.5 mg into the appropriate muscle as directed by prescriber Every 3 (Three) Months., Disp: , Rfl:   •  losartan-hydrochlorothiazide (HYZAAR) 100-25 MG per tablet, Take 1 tablet by mouth Daily., Disp: , Rfl:   •  metFORMIN ER (GLUCOPHAGE-XR) 500 MG 24 hr tablet, Take 1 tablet by mouth 2 (Two) Times a Day., Disp: 60 tablet, Rfl: 5  •  methocarbamol (ROBAXIN) 500 MG tablet, Take 1 tablet by mouth 3 (Three) Times a Day As Needed for Muscle Spasms., Disp: 30 tablet, Rfl: 0  •  metoprolol succinate XL (TOPROL-XL) 100 MG 24 hr tablet, TAKE 1 TABLET BY MOUTH EVERY DAY  (Patient taking differently: Take 100 mg by mouth Daily.), Disp: 90 tablet, Rfl: 0  •  Multiple Vitamins-Minerals (PRESERVISION AREDS 2+MULTI VIT PO), Take  by mouth., Disp: , Rfl:   •  mupirocin (BACTROBAN) 2 % cream, mupirocin calcium 2 % topical cream, Disp: , Rfl:   •  nystatin (MYCOSTATIN) 949549 UNIT/GM powder, Apply  topically to the appropriate area as directed Daily As Needed  "(rash)., Disp: 60 g, Rfl: 0  •  pravastatin (PRAVACHOL) 40 MG tablet, TAKE ONE TABLET BY MOUTH EVERY NIGHT AT BEDTIME (Patient taking differently: Take 40 mg by mouth Daily.), Disp: 90 tablet, Rfl: 0  •  saccharomyces boulardii (Florastor) 250 MG capsule, Florastor 250 mg capsule, Disp: , Rfl:   •  triamterene-hydrochlorothiazide (DYAZIDE) 37.5-25 MG per capsule, TAKE ONE CAPSULE BY MOUTH DAILY, Disp: 90 capsule, Rfl: 2  •  TRUEplus Insulin Syringe 30G X 5/16\" 0.5 ML misc, For daily insulin injection, Disp: 100 each, Rfl: 3    Allergies  Allergies   Allergen Reactions   • Ampicillin Anaphylaxis   • Medrol [Methylprednisolone] Unknown - High Severity     Made his blood sugar get really high, can't take this   • Myrbetriq [Mirabegron] Unknown - High Severity     High BP   • Penicillins Anaphylaxis   • Bee Venom Swelling       Social History  Social History     Socioeconomic History   • Marital status: Single     Spouse name: Not on file   • Number of children: Not on file   • Years of education: Not on file   • Highest education level: Not on file   Tobacco Use   • Smoking status: Never Smoker   • Smokeless tobacco: Never Used   Vaping Use   • Vaping Use: Never used   Substance and Sexual Activity   • Alcohol use: No   • Drug use: No   • Sexual activity: Defer       Family History  Family History   Problem Relation Age of Onset   • Diabetes Mother    • Lung cancer Mother    • Cancer Mother    • Heart disease Father    • Breast cancer Other    • Cancer Other    • Hypertension Other    • Migraines Other    • Obesity Other    • Diabetes Other        Review of Systems  Review of systems was notable for PE.    Physical Exam  Visit Vitals  Pulse 111   Resp 18   Ht 169.2 cm (66.61\")   Wt 84.4 kg (186 lb)   SpO2 95%   BMI 29.47 kg/m²     Physical exam was performed and was notable for normal habitus       Labs  Lab Results   Component Value Date    PSA 3.380 04/28/2021    PSA 1.200 12/30/2020    PSA 0.776 09/23/2020       Lab " Results   Component Value Date    GLUCOSE 188 (H) 04/28/2021    CALCIUM 9.2 04/28/2021     04/28/2021    K 3.8 04/28/2021    CO2 29.7 (H) 04/28/2021    CL 98 04/28/2021    BUN 13 04/28/2021    CREATININE 1.02 04/28/2021    EGFRIFAFRI 95 10/14/2020    EGFRIFNONA 70 04/28/2021    BCR 12.7 04/28/2021    ANIONGAP 10.3 04/28/2021       Lab Results   Component Value Date    WBC 11.40 (H) 04/28/2021    HGB 11.7 (L) 04/28/2021    HCT 35.8 (L) 04/28/2021    MCV 84.8 04/28/2021     04/28/2021       Brief Urine Lab Results  (Last result in the past 365 days)      Color   Clarity   Blood   Leuk Est   Nitrite   Protein   CREAT   Urine HCG        05/12/21 1756 Yellow Clear Negative Negative Negative 30 mg/dL (1+)                Radiologic Studies  CT Abdomen Pelvis With & Without Contrast    Result Date: 3/30/2021  Ill-defined soft tissue density and hypoenhancing or patchy enhancing area within the right hilar region of the right kidney appears to have soft tissue density and involvement concerning for soft tissue mass and/or inflammation given adjacent perinephric stranding without well-defined focal fluid collection within or adjacent. Considerations for pyelonephritis with developing inflammation involving the proximal renal collecting system versus malignancy with urological consultation recommended and considerations for urinary studies or tissue sampling if no resolution on close interval follow-up.  D:  03/30/2021 E:  03/30/2021   This report was finalized on 3/30/2021 6:45 PM by Dr. Bertram Alvarado.      XR Ribs 2 View Right    Result Date: 1/20/2021  No acute parenchymal disease on the right. No displaced right-sided rib fracture present.  D:  01/20/2021 E:  01/20/2021  This report was finalized on 1/20/2021 4:14 PM by Dr. Shanann Juan MD.      NM Lung Scan Perfusion Particulate    Result Date: 3/5/2021  Low probability for pulmonary embolism.  This report was finalized on 3/5/2021 4:22 PM by Manuel  Kirk.      US Renal Limited    Result Date: 3/29/2021  Suboptimal evaluation of soft tissue mass right renal parenchyma with mild-to-moderate hydronephrosis and a simple appearing renal cortical cyst. Given findings on recent CT angiogram chest dated 03/28/2021, recommend further evaluation with a dedicated CT renal protocol evaluation of the right kidney for underlying soft tissue mass possibility.   D:  03/29/2021 E:  03/29/2021  This report was finalized on 3/29/2021 4:39 PM by Dr. Bertram Alvarado.      XR Chest 1 View    Result Date: 3/28/2021  Chronic changes without acute process. Mild cardiomegaly. Signer Name: ARGENTINA AVILA MD  Signed: 3/28/2021 9:07 PM  Workstation Name: [facility]  [facility]    XR Chest 1 View    Result Date: 3/6/2021  The heart Is enlarged with slight prominence of the pulmonary vascularity. No evidence of acute parenchymal disease.  D:  03/05/2021 E:  03/05/2021  This report was finalized on 3/6/2021 11:37 AM by Dr. Shannan Juan MD.      CT Angiogram Chest    Result Date: 3/28/2021  1. 1. Acute pulmonary embolus in the right lower lobe. 2. Cardiac enlargement. 3. Groundglass opacities in the lungs could represent COVID 19 pneumonia, correlate clinically. 4 questionable mass right kidney. Recommend renal ultrasound. 5. mild left pleural effusion. 2.  No gross infiltrate.  Signer Name: Marni Ayoub MD  Signed: 3/28/2021 10:08 PM  Workstation Name: [facility]  [facility]    XR Abdomen KUB    Result Date: 1/20/2021  Stool scattered diffusely throughout the colon suggesting clinical presentation of constipation. Degenerative changes seen within the spine.  D:  01/20/2021 E:  01/20/2021  This report was finalized on 1/20/2021 4:14 PM by Dr. Shannan Juan MD.      XR Chest PA & Lateral    Result Date: 1/20/2021  Cardiac size enlarged with increased central vascular congestion, however no focal consolidation or pleural effusion.   D:  01/20/2021 E:  01/20/2021  This report was finalized on 1/20/2021 6:00 PM by Dr. Bertram Alvarado.            Assessment  Mr. Dietz is a 85 y.o. 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  This is a new diagnosis of uncertain prognosis.     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 Ramon.    Plan  1.  Schedule right ureteroscopy with biopsy, and possible fulguration, possible stent placement, on 05/19/2021. Risk factors associated with this surgery are decreased functional status, and new need for anticoagulation with recent pulmonary embolus, along with chronic O2 usage  2. Stop eliquis 2d prior    I spent a total of 30 minutes with the patient and the chart engaging in data gathering and interpretation, patient interaction, as well as counseling on the risks, benefits, and alternatives of the therapy and coordinating care.

## 2021-06-11 ENCOUNTER — OFFICE VISIT (OUTPATIENT)
Dept: ENDOCRINOLOGY | Facility: CLINIC | Age: 86
End: 2021-06-11

## 2021-06-11 VITALS
SYSTOLIC BLOOD PRESSURE: 120 MMHG | HEIGHT: 66 IN | BODY MASS INDEX: 30.37 KG/M2 | WEIGHT: 189 LBS | DIASTOLIC BLOOD PRESSURE: 72 MMHG | HEART RATE: 106 BPM | OXYGEN SATURATION: 94 %

## 2021-06-11 DIAGNOSIS — E11.65 UNCONTROLLED TYPE 2 DIABETES MELLITUS WITH HYPERGLYCEMIA (HCC): Primary | ICD-10-CM

## 2021-06-11 LAB
EXPIRATION DATE: ABNORMAL
GLUCOSE BLDC GLUCOMTR-MCNC: 269 MG/DL (ref 70–130)
Lab: ABNORMAL

## 2021-06-11 PROCEDURE — 99213 OFFICE O/P EST LOW 20 MIN: CPT | Performed by: INTERNAL MEDICINE

## 2021-06-11 PROCEDURE — 82947 ASSAY GLUCOSE BLOOD QUANT: CPT | Performed by: INTERNAL MEDICINE

## 2021-06-11 NOTE — PROGRESS NOTES
"     Office Note      Date: 2021  Patient Name: Blane Dietz  MRN: [id number]  : 1935    Chief Complaint   Patient presents with   • Diabetes       History of Present Illness:   Blane Dietz is a 85 y.o. male who presents for Diabetes - type 2.  He is taking insulin , metformin, trulicity,  And jardiance.    Last A1c:>12  Hemoglobin A1C   Date Value Ref Range Status   2021 9.6 % Final   2021 11.80 (H) 4.80 - 5.60 % Final       Changes in health since last visit: in the hospital with a PE  . Last eye exam due  Full labs are up to date  Feet- no open wounds .    Subjective        Review of Systems:   Review of Systems   Respiratory: Positive for shortness of breath.        The following portions of the patient's history were reviewed and updated as appropriate: allergies, current medications, past family history, past medical history, past social history, past surgical history and problem list.    Objective     Visit Vitals  /72   Pulse 106   Ht 167.6 cm (66\")   Wt 85.7 kg (189 lb)   SpO2 94%   BMI 30.51 kg/m²       Labs:    CMP  Lab Results   Component Value Date    GLUCOSE 188 (H) 2021    BUN 13 2021    CREATININE 1.02 2021    EGFRIFNONA 70 2021    EGFRIFAFRI 95 10/14/2020    BCR 12.7 2021    K 3.8 2021    CO2 29.7 (H) 2021    CALCIUM 9.2 2021    PROTENTOTREF 6.8 10/14/2020    LABIL2 2.0 10/14/2020    AST 27 2021    ALT 27 2021        CBC w/DIFF  Lab Results   Component Value Date    WBC 11.40 (H) 2021    RBC 4.22 2021    HGB 11.7 (L) 2021    HCT 35.8 (L) 2021    MCV 84.8 2021    MCH 27.7 2021    MCHC 32.7 2021    RDW 14.0 2021    RDWSD 43.2 2021    MPV 8.7 2021     2021    NEUTRORELPCT 74.4 2021    LYMPHORELPCT 13.3 (L) 2021    MONORELPCT 7.1 2021    EOSRELPCT 3.2 2021    BASORELPCT 1.1 2021    AUTOIGPER 0.9 (H) " "04/28/2021    NEUTROABS 8.48 (H) 04/28/2021    LYMPHSABS 1.52 04/28/2021    MONOSABS 0.81 04/28/2021    EOSABS 0.37 04/28/2021    BASOSABS 0.12 04/28/2021    AUTOIGNUM 0.10 (H) 04/28/2021    NRBC 0.0 04/28/2021       Physical Exam:  Physical Exam  Vitals reviewed.   Constitutional:       Appearance: Normal appearance.   Neurological:      Mental Status: He is alert and oriented to person, place, and time.   Psychiatric:         Mood and Affect: Mood normal.         Thought Content: Thought content normal.         Judgment: Judgment normal.          Assessment / Plan      Assessment & Plan:  Problem List Items Addressed This Visit        Other    Uncontrolled type 2 diabetes mellitus with hyperglycemia (CMS/[facility]) - Primary    Overview     Impression: 03/08/2016 - seeing Endo .;          Current Assessment & Plan      a1c much improved.  Down from 12. to 9.6  Acutely hyperglycemic due to eating candy on the way here          Relevant Medications    Jardiance 10 MG tablet    TRUEplus Insulin Syringe 30G X 5/16\" 0.5 ML misc    Lantus 100 UNIT/ML injection    metFORMIN ER (GLUCOPHAGE-XR) 500 MG 24 hr tablet    Dulaglutide (Trulicity) 1.5 MG/0.5ML solution pen-injector    Other Relevant Orders    POC Glucose, Blood (Completed)           Xavier Boston MD   06/11/2021  "

## 2021-06-11 NOTE — ASSESSMENT & PLAN NOTE
a1c much improved.  Down from 12. to 9.6  Acutely hyperglycemic due to eating candy on the way here

## 2021-06-17 ENCOUNTER — LAB (OUTPATIENT)
Dept: LAB | Facility: HOSPITAL | Age: 86
End: 2021-06-17

## 2021-06-17 ENCOUNTER — TRANSCRIBE ORDERS (OUTPATIENT)
Dept: LAB | Facility: HOSPITAL | Age: 86
End: 2021-06-17

## 2021-06-17 ENCOUNTER — HOSPITAL ENCOUNTER (OUTPATIENT)
Dept: GENERAL RADIOLOGY | Facility: HOSPITAL | Age: 86
Discharge: HOME | End: 2021-06-17

## 2021-06-17 ENCOUNTER — TRANSCRIBE ORDERS (OUTPATIENT)
Dept: GENERAL RADIOLOGY | Facility: HOSPITAL | Age: 86
End: 2021-06-17

## 2021-06-17 DIAGNOSIS — Z86.711 PERSONAL HISTORY OF PE (PULMONARY EMBOLISM): ICD-10-CM

## 2021-06-17 DIAGNOSIS — G47.30 INSOMNIA WITH SLEEP APNEA: ICD-10-CM

## 2021-06-17 DIAGNOSIS — I10 ESSENTIAL HYPERTENSION, MALIGNANT: ICD-10-CM

## 2021-06-17 DIAGNOSIS — G47.00 INSOMNIA WITH SLEEP APNEA: ICD-10-CM

## 2021-06-17 DIAGNOSIS — I50.20: ICD-10-CM

## 2021-06-17 DIAGNOSIS — I50.20: Primary | ICD-10-CM

## 2021-06-17 DIAGNOSIS — I10 ESSENTIAL HYPERTENSION, MALIGNANT: Primary | ICD-10-CM

## 2021-06-17 LAB
ANION GAP SERPL CALCULATED.3IONS-SCNC: 10.1 MMOL/L (ref 5–15)
BUN SERPL-MCNC: 16 MG/DL (ref 8–23)
BUN/CREAT SERPL: 13.2 (ref 7–25)
CALCIUM SPEC-SCNC: 9.8 MG/DL (ref 8.6–10.5)
CHLORIDE SERPL-SCNC: 101 MMOL/L (ref 98–107)
CO2 SERPL-SCNC: 30.9 MMOL/L (ref 22–29)
CREAT SERPL-MCNC: 1.21 MG/DL (ref 0.76–1.27)
DEPRECATED RDW RBC AUTO: 45.5 FL (ref 37–54)
ERYTHROCYTE [DISTWIDTH] IN BLOOD BY AUTOMATED COUNT: 14.8 % (ref 12.3–15.4)
GFR SERPL CREATININE-BSD FRML MDRD: 57 ML/MIN/1.73
GLUCOSE SERPL-MCNC: 284 MG/DL (ref 65–99)
HCT VFR BLD AUTO: 41.4 % (ref 37.5–51)
HGB BLD-MCNC: 13.3 G/DL (ref 13–17.7)
MCH RBC QN AUTO: 27.3 PG (ref 26.6–33)
MCHC RBC AUTO-ENTMCNC: 32.1 G/DL (ref 31.5–35.7)
MCV RBC AUTO: 84.8 FL (ref 79–97)
NT-PROBNP SERPL-MCNC: 1604 PG/ML (ref 0–1800)
PLATELET # BLD AUTO: 231 10*3/MM3 (ref 140–450)
PMV BLD AUTO: 10.5 FL (ref 6–12)
POTASSIUM SERPL-SCNC: 3.6 MMOL/L (ref 3.5–5.2)
RBC # BLD AUTO: 4.88 10*6/MM3 (ref 4.14–5.8)
SODIUM SERPL-SCNC: 142 MMOL/L (ref 136–145)
WBC # BLD AUTO: 11.22 10*3/MM3 (ref 3.4–10.8)

## 2021-06-17 PROCEDURE — 80048 BASIC METABOLIC PNL TOTAL CA: CPT | Performed by: INTERNAL MEDICINE

## 2021-06-17 PROCEDURE — 83880 ASSAY OF NATRIURETIC PEPTIDE: CPT | Performed by: INTERNAL MEDICINE

## 2021-06-17 PROCEDURE — 71046 X-RAY EXAM CHEST 2 VIEWS: CPT

## 2021-06-17 PROCEDURE — 85027 COMPLETE CBC AUTOMATED: CPT | Performed by: INTERNAL MEDICINE

## 2021-06-17 PROCEDURE — 36415 COLL VENOUS BLD VENIPUNCTURE: CPT | Performed by: INTERNAL MEDICINE

## 2021-06-30 ENCOUNTER — TELEPHONE (OUTPATIENT)
Dept: UROLOGY | Facility: CLINIC | Age: 86
End: 2021-06-30

## 2021-06-30 NOTE — TELEPHONE ENCOUNTER
Patient missed his PAT appointment 06/29/2021.  I spoke with him today, he states he wants to cancel his surgery 07/13/2021 due to having other health problems. Patient was notified that Dr. Jaimes has concerns of cancer spreading.  He stated he appreciated the concern, but he's  just not up to having it done at this time.  I told his to call us to reschedule ASAP.

## 2021-07-09 ENCOUNTER — APPOINTMENT (OUTPATIENT)
Dept: LAB | Facility: HOSPITAL | Age: 86
End: 2021-07-09

## 2021-09-01 ENCOUNTER — TELEPHONE (OUTPATIENT)
Dept: ONCOLOGY | Facility: CLINIC | Age: 86
End: 2021-09-01

## 2021-09-01 ENCOUNTER — INFUSION (OUTPATIENT)
Dept: ONCOLOGY | Facility: HOSPITAL | Age: 86
End: 2021-09-01

## 2021-09-01 ENCOUNTER — OFFICE VISIT (OUTPATIENT)
Dept: ONCOLOGY | Facility: CLINIC | Age: 86
End: 2021-09-01

## 2021-09-01 VITALS
DIASTOLIC BLOOD PRESSURE: 68 MMHG | TEMPERATURE: 97.7 F | BODY MASS INDEX: 30.05 KG/M2 | SYSTOLIC BLOOD PRESSURE: 142 MMHG | RESPIRATION RATE: 16 BRPM | OXYGEN SATURATION: 97 % | WEIGHT: 187 LBS | HEIGHT: 66 IN | HEART RATE: 88 BPM

## 2021-09-01 DIAGNOSIS — C61 PROSTATE CANCER (HCC): Primary | ICD-10-CM

## 2021-09-01 LAB
ALBUMIN SERPL-MCNC: 4.4 G/DL (ref 3.5–5.2)
ALBUMIN/GLOB SERPL: 1.3 G/DL
ALP SERPL-CCNC: 82 U/L (ref 39–117)
ALT SERPL W P-5'-P-CCNC: 28 U/L (ref 1–41)
ANION GAP SERPL CALCULATED.3IONS-SCNC: 10.2 MMOL/L (ref 5–15)
AST SERPL-CCNC: 27 U/L (ref 1–40)
BASOPHILS # BLD AUTO: 0.26 10*3/MM3 (ref 0–0.2)
BASOPHILS NFR BLD AUTO: 2.7 % (ref 0–1.5)
BILIRUB SERPL-MCNC: 0.8 MG/DL (ref 0–1.2)
BUN SERPL-MCNC: 26 MG/DL (ref 8–23)
BUN/CREAT SERPL: 23 (ref 7–25)
CALCIUM SPEC-SCNC: 10.5 MG/DL (ref 8.6–10.5)
CHLORIDE SERPL-SCNC: 99 MMOL/L (ref 98–107)
CO2 SERPL-SCNC: 28.8 MMOL/L (ref 22–29)
CREAT SERPL-MCNC: 1.13 MG/DL (ref 0.76–1.27)
DEPRECATED RDW RBC AUTO: 47.3 FL (ref 37–54)
EOSINOPHIL # BLD AUTO: 0.43 10*3/MM3 (ref 0–0.4)
EOSINOPHIL NFR BLD AUTO: 4.5 % (ref 0.3–6.2)
ERYTHROCYTE [DISTWIDTH] IN BLOOD BY AUTOMATED COUNT: 14.9 % (ref 12.3–15.4)
GFR SERPL CREATININE-BSD FRML MDRD: 62 ML/MIN/1.73
GLOBULIN UR ELPH-MCNC: 3.4 GM/DL
GLUCOSE SERPL-MCNC: 147 MG/DL (ref 65–99)
HCT VFR BLD AUTO: 42.5 % (ref 37.5–51)
HGB BLD-MCNC: 13.9 G/DL (ref 13–17.7)
IMM GRANULOCYTES # BLD AUTO: 0.2 10*3/MM3 (ref 0–0.05)
IMM GRANULOCYTES NFR BLD AUTO: 2.1 % (ref 0–0.5)
LYMPHOCYTES # BLD AUTO: 2.14 10*3/MM3 (ref 0.7–3.1)
LYMPHOCYTES NFR BLD AUTO: 22.2 % (ref 19.6–45.3)
MAGNESIUM SERPL-MCNC: 1.6 MG/DL (ref 1.6–2.4)
MCH RBC QN AUTO: 28.3 PG (ref 26.6–33)
MCHC RBC AUTO-ENTMCNC: 32.7 G/DL (ref 31.5–35.7)
MCV RBC AUTO: 86.6 FL (ref 79–97)
MONOCYTES # BLD AUTO: 0.7 10*3/MM3 (ref 0.1–0.9)
MONOCYTES NFR BLD AUTO: 7.2 % (ref 5–12)
NEUTROPHILS NFR BLD AUTO: 5.93 10*3/MM3 (ref 1.7–7)
NEUTROPHILS NFR BLD AUTO: 61.3 % (ref 42.7–76)
NRBC BLD AUTO-RTO: 0 /100 WBC (ref 0–0.2)
PHOSPHATE SERPL-MCNC: 4.5 MG/DL (ref 2.5–4.5)
PLATELET # BLD AUTO: 167 10*3/MM3 (ref 140–450)
PMV BLD AUTO: 9.1 FL (ref 6–12)
POTASSIUM SERPL-SCNC: 4 MMOL/L (ref 3.5–5.2)
PROT SERPL-MCNC: 7.8 G/DL (ref 6–8.5)
RBC # BLD AUTO: 4.91 10*6/MM3 (ref 4.14–5.8)
SODIUM SERPL-SCNC: 138 MMOL/L (ref 136–145)
WBC # BLD AUTO: 9.66 10*3/MM3 (ref 3.4–10.8)

## 2021-09-01 PROCEDURE — 99214 OFFICE O/P EST MOD 30 MIN: CPT | Performed by: INTERNAL MEDICINE

## 2021-09-01 PROCEDURE — 80053 COMPREHEN METABOLIC PANEL: CPT

## 2021-09-01 PROCEDURE — 83735 ASSAY OF MAGNESIUM: CPT

## 2021-09-01 PROCEDURE — 25010000002 LEUPROLIDE 22.5 MG KIT: Performed by: INTERNAL MEDICINE

## 2021-09-01 PROCEDURE — 96402 CHEMO HORMON ANTINEOPL SQ/IM: CPT

## 2021-09-01 PROCEDURE — 85025 COMPLETE CBC W/AUTO DIFF WBC: CPT

## 2021-09-01 PROCEDURE — 25010000002 DENOSUMAB 60 MG/ML SOLUTION PREFILLED SYRINGE: Performed by: INTERNAL MEDICINE

## 2021-09-01 PROCEDURE — 84100 ASSAY OF PHOSPHORUS: CPT

## 2021-09-01 PROCEDURE — 36415 COLL VENOUS BLD VENIPUNCTURE: CPT

## 2021-09-01 PROCEDURE — 96372 THER/PROPH/DIAG INJ SC/IM: CPT

## 2021-09-01 RX ORDER — FUROSEMIDE 40 MG/1
40 TABLET ORAL DAILY
COMMUNITY
Start: 2021-06-17 | End: 2021-12-06

## 2021-09-01 RX ADMIN — DENOSUMAB 60 MG: 60 INJECTION SUBCUTANEOUS at 12:28

## 2021-09-01 RX ADMIN — LEUPROLIDE ACETATE 22.5 MG: KIT SUBCUTANEOUS at 12:28

## 2021-09-01 NOTE — PROGRESS NOTES
DATE OF VISIT: 9/1/2021     REASON FOR VISIT:   1. Prostate cancer. Presented with stage I, [id number], PSA at diagnosis 3 status  post prostatectomy in 2000.  2. Relapsed disease with rise in PSA gradually over the last couple of years,  most recent PSA 7.1 ng/mL on 01/25/2016.      HISTORY OF PRESENT ILLNESS: The patient is a very pleasant 80-year-old  gentleman with past medical history significant for prostate cancer status post  radical prostatectomy in 2000. The patient never received adjuvant treatments,  neither radiation, GnRH or antiandrogen treatment. The patient's PSA started  to go up gradually over the last 2 years. Patient was referred to me on  06/13/2014 and I did restaging workup that came back negative including bone  scan, CAT scans chest, abdomen and pelvis. We elected to proceed with watchful  waiting. Patient is here today in scheduled followup visit.     SUBJECTIVE: Mr. Dietz is here today by himself.  He is doing fairly well.  Is complaining of fatigue.  He did not get his cystoscopy he was worried about potential complications.    REVIEW OF SYSTEMS: All the other 9 systems are reviewed by me and negative  except what is mentioned in HPI and subjective.      PAST MEDICAL HISTORY/SOCIAL HISTORY/FAMILY HISTORY: Unchanged from my prior  documentation on 06/13/2014.       Current Outpatient Medications:   •  allopurinol (ZYLOPRIM) 300 MG tablet, TAKE 1 TABLET BY MOUTH ONE TIME A DAY  (Patient taking differently: Take 300 mg by mouth Daily.), Disp: 90 tablet, Rfl: 0  •  amLODIPine (NORVASC) 5 MG tablet, Take 1 tablet by mouth Daily., Disp: 30 tablet, Rfl: 1  •  apixaban (ELIQUIS) 5 MG tablet tablet, Take 2 tablets by mouth every 12 hours x5 days then decrease to 1 tablet by mouth every 12 hours starting on 4/6/21. (Patient taking differently: Take 5 mg by mouth Every 12 (Twelve) Hours. Take 2 tablets by mouth every 12 hours x5 days then decrease to 1 tablet by mouth every 12 hours starting on  4/6/21.), Disp: 70 tablet, Rfl: 1  •  aspirin 81 MG EC tablet, Take 81 mg by mouth Daily., Disp: , Rfl:   •  CRISTINA CONTOUR TEST test strip, , Disp: , Rfl:   •  Cholecalciferol (VITAMIN D3) 2000 UNITS capsule, Take  by mouth 2 (two) times a day., Disp: , Rfl:   •  Cinnamon 500 MG capsule, Take 2,000 mg by mouth Daily., Disp: , Rfl:   •  Coenzyme Q10 100 MG tablet, Take 2 tablets by mouth Daily., Disp: , Rfl:   •  Dulaglutide (Trulicity) 1.5 MG/0.5ML solution pen-injector, Inject 0.75 mg under the skin into the appropriate area as directed 1 (One) Time Per Week., Disp: 6 mL, Rfl: 0  •  fenofibric acid (TRILIPIX) 135 MG capsule delayed-release delayed release capsule, TAKE 1 CAPSULE BY MOUTH ONE TIME A DAY , Disp: 30 capsule, Rfl: 2  •  fluticasone (FLONASE) 50 MCG/ACT nasal spray, 2 sprays into the nostril(s) as directed by provider Daily. 2 puffs each nostril (Patient taking differently: 2 sprays into the nostril(s) as directed by provider Daily As Needed. 2 puffs each nostril), Disp: 1 bottle, Rfl: 0  •  furosemide (LASIX) 40 MG tablet, , Disp: , Rfl:   •  Jardiance 10 MG tablet, TAKE 1 TABLET BY MOUTH EVERY DAY  (Patient taking differently: Take  by mouth Daily.), Disp: 30 tablet, Rfl: 3  •  l-methylfolate-algae-B6-B12 (METANX) 3-90.314-2-35 MG capsule capsule, TAKE 1 CAPSULE BY MOUTH TWICE A DAY. (Patient taking differently: Take 1 capsule by mouth Daily As Needed.), Disp: 180 capsule, Rfl: 1  •  Lantus 100 UNIT/ML injection, Up to 50 units daily (Patient taking differently: Inject 25 Units under the skin into the appropriate area as directed Every Night. Up to 50 units daily), Disp: 20 mL, Rfl: 5  •  Lecithin 1200 MG capsule, Take  by mouth Daily., Disp: , Rfl:   •  leuprolide (LUPRON) 22.5 MG injection, Inject 22.5 mg into the appropriate muscle as directed by prescriber Every 3 (Three) Months., Disp: , Rfl:   •  losartan-hydrochlorothiazide (HYZAAR) 100-25 MG per tablet, Take 1 tablet by mouth Daily., Disp: ,  "Rfl:   •  metFORMIN ER (GLUCOPHAGE-XR) 500 MG 24 hr tablet, Take 1 tablet by mouth 2 (Two) Times a Day., Disp: 60 tablet, Rfl: 5  •  methocarbamol (ROBAXIN) 500 MG tablet, Take 1 tablet by mouth 3 (Three) Times a Day As Needed for Muscle Spasms., Disp: 30 tablet, Rfl: 0  •  metoprolol succinate XL (TOPROL-XL) 100 MG 24 hr tablet, TAKE 1 TABLET BY MOUTH EVERY DAY  (Patient taking differently: Take 100 mg by mouth Daily.), Disp: 90 tablet, Rfl: 0  •  Multiple Vitamins-Minerals (PRESERVISION AREDS 2+MULTI VIT PO), Take  by mouth., Disp: , Rfl:   •  mupirocin (BACTROBAN) 2 % cream, mupirocin calcium 2 % topical cream, Disp: , Rfl:   •  nystatin (MYCOSTATIN) 251508 UNIT/GM powder, Apply  topically to the appropriate area as directed Daily As Needed (rash)., Disp: 60 g, Rfl: 0  •  pravastatin (PRAVACHOL) 40 MG tablet, TAKE ONE TABLET BY MOUTH EVERY NIGHT AT BEDTIME (Patient taking differently: Take 40 mg by mouth Daily.), Disp: 90 tablet, Rfl: 0  •  saccharomyces boulardii (Florastor) 250 MG capsule, Florastor 250 mg capsule, Disp: , Rfl:   •  triamterene-hydrochlorothiazide (DYAZIDE) 37.5-25 MG per capsule, TAKE ONE CAPSULE BY MOUTH DAILY, Disp: 90 capsule, Rfl: 2  •  TRUEplus Insulin Syringe 30G X 5/16\" 0.5 ML misc, For daily insulin injection, Disp: 100 each, Rfl: 3    PHYSICAL EXAMINATION:   /68   Pulse 88   Temp 97.7 °F (36.5 °C) (Temporal)   Resp 16   Ht 167.6 cm (66\")   Wt 84.8 kg (187 lb)   SpO2 97%   BMI 30.18 kg/m²   ECOG1  GENERAL: Age appropriate. No acute distress.   HEENT: Head atraumatic, normocephalic.   NECK: Supple. No JVD. No lymphadenopathy.   LUNGS: Clear to auscultation bilaterally. No wheezing. No rhonchi.   HEART: Regular rate and rhythm. S1, S2, no murmurs.   ABDOMEN: Soft, nontender, nondistended. Bowel sounds positive. No  hepatosplenomegaly.   EXTREMITIES: No clubbing, cyanosis, or edema.   SKIN: No rashes. No purpura.   NEUROLOGIC: Awake and oriented x3. Strength 5 out of 5 in all " muscle groups.     No visits with results within 2 Week(s) from this visit.   Latest known visit with results is:   Transcribe Orders on 06/17/2021   Component Date Value Ref Range Status   • Glucose 06/17/2021 284* 65 - 99 mg/dL Final   • BUN 06/17/2021 16  8 - 23 mg/dL Final   • Creatinine 06/17/2021 1.21  0.76 - 1.27 mg/dL Final   • Sodium 06/17/2021 142  136 - 145 mmol/L Final   • Potassium 06/17/2021 3.6  3.5 - 5.2 mmol/L Final   • Chloride 06/17/2021 101  98 - 107 mmol/L Final   • CO2 06/17/2021 30.9* 22.0 - 29.0 mmol/L Final   • Calcium 06/17/2021 9.8  8.6 - 10.5 mg/dL Final   • eGFR Non African Amer 06/17/2021 57* >60 mL/min/1.73 Final   • BUN/Creatinine Ratio 06/17/2021 13.2  7.0 - 25.0 Final   • Anion Gap 06/17/2021 10.1  5.0 - 15.0 mmol/L Final   • proBNP 06/17/2021 1,604.0  0.0-1,800.0 pg/mL Final   • WBC 06/17/2021 11.22* 3.40 - 10.80 10*3/mm3 Final   • RBC 06/17/2021 4.88  4.14 - 5.80 10*6/mm3 Final   • Hemoglobin 06/17/2021 13.3  13.0 - 17.7 g/dL Final   • Hematocrit 06/17/2021 41.4  37.5 - 51.0 % Final   • MCV 06/17/2021 84.8  79.0 - 97.0 fL Final   • MCH 06/17/2021 27.3  26.6 - 33.0 pg Final   • MCHC 06/17/2021 32.1  31.5 - 35.7 g/dL Final   • RDW 06/17/2021 14.8  12.3 - 15.4 % Final   • RDW-SD 06/17/2021 45.5  37.0 - 54.0 fl Final   • MPV 06/17/2021 10.5  6.0 - 12.0 fL Final   • Platelets 06/17/2021 231  140 - 450 10*3/mm3 Final        ASSESSMENT: The patient is a very pleasant 80-year-old gentleman with relapsed  prostate cancer.     PROBLEM LIST:  1. Prostate cancer, initially presented as [id number] status post radical  prostatectomy 2000.  A.  Patient is on Lupron plus Prolia  2.  Right-sided pulmonary embolisms:  A.  Currently on Eliquis twice a day  3.  Type 2 diabetes  4. Hhypertension  5. Hypercholesterolemia.  6.  Right subsegmental PE:  A. Started on Eliquis twice a day March 2021  7.  Osteopenia  8.  5 cm right kidney lesion    PLAN:  1. I did go over the blood work results in detail with  the patient from April 28, 2021 unfortunately his PSA did increase to 3.38.  I will go ahead and add apalutamide.  2. The patient will return to clinic in 3 months with repeat blood work as well as serum PSA.  We will go over final pathology report from right kidney mass at that point.  3. I will repeat his CT scans prior to return to further evaluate the right kidney lesion.  The patient is not interested in cystoscopy at this point.   4.  He will continue with Mo and Trulicity subcu for type 2 diabetes.  5.  We'll continue pravastatin for hypercholesterolemia  6.  Patient need to have 5 year follow-up colonoscopy which would be due 11/2021.  7. We reviewed the potential side effects of the treatment including hot flashes, impotence, joint pain, decrease in bone density, mood or sleep disturbance, and asthenia.  8.  I'll continue the patient on Prolia 60 mg subcutaneous every 6 month.  His next dose will be due today June 2020.  We'll continue calcium with vitamin D daily.  9.  We will continue Eliquis 5 mg twice a day.  I will reduce his dose to 2.5 mg twice a day on return this will be his maintenance dose.  10.  The patient is not interested in cystoscopy at this point.    Shannan Ramon MD    9/1/2021

## 2021-09-01 NOTE — TELEPHONE ENCOUNTER
----- Message from Kim Alaniz PharmD sent at 9/1/2021 12:43 PM EDT -----  Regarding: RE: Patient needs to start apalutamide please  You should be able to enter it now.    Josefina RANDALL, putting a treatment 2 in for an oral.  ----- Message -----  From: Erin Todd RN  Sent: 9/1/2021  11:55 AM EDT  To: Kim Alaniz PharmD  Subject: Patient needs to start apalutamide please        Let me know when I can drop this under treatment plan two.

## 2021-09-02 ENCOUNTER — SPECIALTY PHARMACY (OUTPATIENT)
Dept: ONCOLOGY | Facility: HOSPITAL | Age: 86
End: 2021-09-02

## 2021-09-02 DIAGNOSIS — C61 PROSTATE CANCER (HCC): Primary | ICD-10-CM

## 2021-09-03 ENCOUNTER — SPECIALTY PHARMACY (OUTPATIENT)
Dept: ONCOLOGY | Facility: HOSPITAL | Age: 86
End: 2021-09-03

## 2021-09-03 DIAGNOSIS — C61 PROSTATE CANCER (HCC): Primary | ICD-10-CM

## 2021-09-03 RX ORDER — ONDANSETRON HYDROCHLORIDE 8 MG/1
8 TABLET, FILM COATED ORAL 3 TIMES DAILY PRN
Qty: 30 TABLET | Refills: 5 | Status: SHIPPED | OUTPATIENT
Start: 2021-09-03 | End: 2022-02-02

## 2021-09-03 NOTE — PROGRESS NOTES
Specialty Pharmacy Assessment    Apalutamide (Erleada)  Dose: 240 mg  Indication: Prostate cancer  Frequency: Once a day  Goals of Therapy: Palliative  New Start: Yes  Prescription submitted to: [facility]  Additional notes: Oral chemotherapy clinic received referral from Dr. Ramon regarding the initiation of apalutamide. Reviewed patient chart. Patient is castration naive. He will continue Lupron and Prolia.  Patient will be reminded to take calcium + vit D. No allergies to be alerted about.  Patient is currently on Eliquis prescribed by Dr. Ramon. In combination with apalutamide, the exposure of Eliquis may be decreased.  I will discuss this with Dr. Ramon.  Patient had labs 9/1 and a CrCl of 62; however, no dose adjustment is needed.  Released script for apalutamide 240mg PO daily, #120 with 3 RF to [facility] to begin processing. Pt to be scheduled for oral chemotherapy education and financial navigation appt.  Will obtain consent and CCA at that time.

## 2021-09-03 NOTE — PLAN OF CARE
Specialty Pharmacy Assessment    Apalutamide (Erleada)  Dose: 240 mg  Indication: Prostate cancer  Frequency: Once a day  Goals of Therapy: Other  Other goals of therapy: control  Anticipated start date: 9/7/21  New Start: Yes  Counseling: Take orally once daily at the same time each day, Take with or without food, Swallow capsules whole. Do not chew, dissolve, or open the capsules, It should not be handled by people other than the patient or caregivers, and especially not by females who are or may become pregnant or are breastfeeding, Store medication at room temperature in a tightly closed container out of the reach of children, Most common side effects - weakness or feeling more tired than usual, increased blood sugar, increased potassium levels, increased triglycerides and cholesterol, low blood counts  Serious side effects: seizure, heart disease, falls, fractures  Additional notes: Discussed with patient about the importance of starting to take calcium + Vitamin D supplementations daily. Note, Pt is also receiving leuprolide and denosumab injections. Discussed aforementioned material. Reminded patient of the pharmacist's contact information and instructions to call should additional questions arise. All questions and concerns addressed. Completed a medication reconciliation. Provided appropriate educational materials and a personalized calendar of tentative treatment cycles. Patient is planning to continue on treatment until disease progression or unacceptable toxicity occurs.

## 2021-09-08 ENCOUNTER — HOSPITAL ENCOUNTER (OUTPATIENT)
Dept: CT IMAGING | Facility: HOSPITAL | Age: 86
Discharge: HOME | End: 2021-09-08
Admitting: INTERNAL MEDICINE

## 2021-09-08 DIAGNOSIS — C61 PROSTATE CANCER (HCC): ICD-10-CM

## 2021-09-08 PROCEDURE — 25010000002 IOPAMIDOL 61 % SOLUTION: Performed by: INTERNAL MEDICINE

## 2021-09-08 PROCEDURE — 74177 CT ABD & PELVIS W/CONTRAST: CPT

## 2021-09-08 RX ADMIN — IOPAMIDOL 100 ML: 612 INJECTION, SOLUTION INTRAVENOUS at 15:00

## 2021-09-13 ENCOUNTER — TELEPHONE (OUTPATIENT)
Dept: ONCOLOGY | Facility: CLINIC | Age: 86
End: 2021-09-13

## 2021-09-13 NOTE — TELEPHONE ENCOUNTER
----- Message from Erin Todd RN sent at 9/8/2021  3:54 PM EDT -----  Regarding: move f/u up to 9/29 per mohan.

## 2021-09-29 ENCOUNTER — LAB (OUTPATIENT)
Dept: LAB | Facility: HOSPITAL | Age: 86
End: 2021-09-29

## 2021-09-29 ENCOUNTER — OFFICE VISIT (OUTPATIENT)
Dept: ONCOLOGY | Facility: CLINIC | Age: 86
End: 2021-09-29

## 2021-09-29 VITALS
BODY MASS INDEX: 31.02 KG/M2 | RESPIRATION RATE: 16 BRPM | HEIGHT: 66 IN | TEMPERATURE: 97.3 F | DIASTOLIC BLOOD PRESSURE: 96 MMHG | HEART RATE: 129 BPM | OXYGEN SATURATION: 94 % | SYSTOLIC BLOOD PRESSURE: 141 MMHG | WEIGHT: 193 LBS

## 2021-09-29 DIAGNOSIS — C61 PROSTATE CANCER (HCC): Primary | ICD-10-CM

## 2021-09-29 DIAGNOSIS — C66.1 CANCER OF RIGHT URETER (HCC): ICD-10-CM

## 2021-09-29 DIAGNOSIS — C61 PROSTATE CANCER (HCC): ICD-10-CM

## 2021-09-29 LAB
ALBUMIN SERPL-MCNC: 4.5 G/DL (ref 3.5–5.2)
ALBUMIN/GLOB SERPL: 1.4 G/DL
ALP SERPL-CCNC: 97 U/L (ref 39–117)
ALT SERPL W P-5'-P-CCNC: 34 U/L (ref 1–41)
ANION GAP SERPL CALCULATED.3IONS-SCNC: 11.5 MMOL/L (ref 5–15)
AST SERPL-CCNC: 36 U/L (ref 1–40)
BASOPHILS # BLD AUTO: 0.26 10*3/MM3 (ref 0–0.2)
BASOPHILS NFR BLD AUTO: 2.1 % (ref 0–1.5)
BILIRUB SERPL-MCNC: 0.8 MG/DL (ref 0–1.2)
BUN SERPL-MCNC: 16 MG/DL (ref 8–23)
BUN/CREAT SERPL: 15.7 (ref 7–25)
CALCIUM SPEC-SCNC: 9.5 MG/DL (ref 8.6–10.5)
CHLORIDE SERPL-SCNC: 103 MMOL/L (ref 98–107)
CO2 SERPL-SCNC: 27.5 MMOL/L (ref 22–29)
CREAT SERPL-MCNC: 1.02 MG/DL (ref 0.76–1.27)
DEPRECATED RDW RBC AUTO: 48.3 FL (ref 37–54)
EOSINOPHIL # BLD AUTO: 0.52 10*3/MM3 (ref 0–0.4)
EOSINOPHIL NFR BLD AUTO: 4.2 % (ref 0.3–6.2)
ERYTHROCYTE [DISTWIDTH] IN BLOOD BY AUTOMATED COUNT: 14.8 % (ref 12.3–15.4)
GFR SERPL CREATININE-BSD FRML MDRD: 70 ML/MIN/1.73
GLOBULIN UR ELPH-MCNC: 3.3 GM/DL
GLUCOSE SERPL-MCNC: 195 MG/DL (ref 65–99)
HCT VFR BLD AUTO: 41.7 % (ref 37.5–51)
HGB BLD-MCNC: 13.5 G/DL (ref 13–17.7)
IMM GRANULOCYTES # BLD AUTO: 0.2 10*3/MM3 (ref 0–0.05)
IMM GRANULOCYTES NFR BLD AUTO: 1.6 % (ref 0–0.5)
LYMPHOCYTES # BLD AUTO: 2.17 10*3/MM3 (ref 0.7–3.1)
LYMPHOCYTES NFR BLD AUTO: 17.4 % (ref 19.6–45.3)
MCH RBC QN AUTO: 28.5 PG (ref 26.6–33)
MCHC RBC AUTO-ENTMCNC: 32.4 G/DL (ref 31.5–35.7)
MCV RBC AUTO: 88.2 FL (ref 79–97)
MONOCYTES # BLD AUTO: 0.76 10*3/MM3 (ref 0.1–0.9)
MONOCYTES NFR BLD AUTO: 6.1 % (ref 5–12)
NEUTROPHILS NFR BLD AUTO: 68.6 % (ref 42.7–76)
NEUTROPHILS NFR BLD AUTO: 8.54 10*3/MM3 (ref 1.7–7)
NRBC BLD AUTO-RTO: 0 /100 WBC (ref 0–0.2)
PLATELET # BLD AUTO: 198 10*3/MM3 (ref 140–450)
PMV BLD AUTO: 9.7 FL (ref 6–12)
POTASSIUM SERPL-SCNC: 3.7 MMOL/L (ref 3.5–5.2)
PROT SERPL-MCNC: 7.8 G/DL (ref 6–8.5)
PSA SERPL-MCNC: 4.18 NG/ML (ref 0–4)
RBC # BLD AUTO: 4.73 10*6/MM3 (ref 4.14–5.8)
SODIUM SERPL-SCNC: 142 MMOL/L (ref 136–145)
WBC # BLD AUTO: 12.45 10*3/MM3 (ref 3.4–10.8)

## 2021-09-29 PROCEDURE — 85025 COMPLETE CBC W/AUTO DIFF WBC: CPT

## 2021-09-29 PROCEDURE — 80053 COMPREHEN METABOLIC PANEL: CPT

## 2021-09-29 PROCEDURE — 84153 ASSAY OF PSA TOTAL: CPT

## 2021-09-29 PROCEDURE — 99214 OFFICE O/P EST MOD 30 MIN: CPT | Performed by: INTERNAL MEDICINE

## 2021-09-29 PROCEDURE — 36415 COLL VENOUS BLD VENIPUNCTURE: CPT

## 2021-09-29 NOTE — PROGRESS NOTES
DATE OF VISIT: 9/29/2021     REASON FOR VISIT:   1. Prostate cancer. Presented with stage I, [id number], PSA at diagnosis 3 status  post prostatectomy in 2000.  2. Relapsed disease with rise in PSA gradually over the last couple of years,  most recent PSA 7.1 ng/mL on 01/25/2016.      HISTORY OF PRESENT ILLNESS: The patient is a very pleasant 80-year-old  gentleman with past medical history significant for prostate cancer status post  radical prostatectomy in 2000. The patient never received adjuvant treatments,  neither radiation, GnRH or antiandrogen treatment. The patient's PSA started  to go up gradually over the last 2 years. Patient was referred to me on  06/13/2014 and I did restaging workup that came back negative including bone  scan, CAT scans chest, abdomen and pelvis. Patient is here today in scheduled followup visit.      SUBJECTIVE: Mr. Dietz is here today by himself.  He is complaining of fatigue.  He is anxious about the scan findings.    REVIEW OF SYSTEMS: All the other 9 systems are reviewed by me and negative  except what is mentioned in HPI and subjective.      PAST MEDICAL HISTORY/SOCIAL HISTORY/FAMILY HISTORY: Unchanged from my prior  documentation on 06/13/2014.       Current Outpatient Medications:   •  allopurinol (ZYLOPRIM) 300 MG tablet, TAKE 1 TABLET BY MOUTH ONE TIME A DAY  (Patient taking differently: Take 300 mg by mouth Daily.), Disp: 90 tablet, Rfl: 0  •  amLODIPine (NORVASC) 5 MG tablet, Take 1 tablet by mouth Daily., Disp: 30 tablet, Rfl: 1  •  Apalutamide 60 MG tablet, Take 4 tablets by mouth once daily with or without food. Do not crush or chew tablets., Disp: 120 tablet, Rfl: 3  •  apixaban (ELIQUIS) 5 MG tablet tablet, Take 2 tablets by mouth every 12 hours x5 days then decrease to 1 tablet by mouth every 12 hours starting on 4/6/21. (Patient taking differently: Take 5 mg by mouth Every 12 (Twelve) Hours. Take 2 tablets by mouth every 12 hours x5 days then decrease to 1 tablet by  mouth every 12 hours starting on 4/6/21.), Disp: 70 tablet, Rfl: 1  •  aspirin 81 MG EC tablet, Take 81 mg by mouth Daily., Disp: , Rfl:   •  CRISTINA CONTOUR TEST test strip, , Disp: , Rfl:   •  Cholecalciferol (VITAMIN D3) 2000 UNITS capsule, Take  by mouth 2 (two) times a day., Disp: , Rfl:   •  Cinnamon 500 MG capsule, Take 2,000 mg by mouth Daily., Disp: , Rfl:   •  Coenzyme Q10 100 MG tablet, Take 2 tablets by mouth Daily., Disp: , Rfl:   •  Dulaglutide (Trulicity) 1.5 MG/0.5ML solution pen-injector, Inject 0.75 mg under the skin into the appropriate area as directed 1 (One) Time Per Week., Disp: 6 mL, Rfl: 0  •  fenofibric acid (TRILIPIX) 135 MG capsule delayed-release delayed release capsule, TAKE 1 CAPSULE BY MOUTH ONE TIME A DAY , Disp: 30 capsule, Rfl: 2  •  fluticasone (FLONASE) 50 MCG/ACT nasal spray, 2 sprays into the nostril(s) as directed by provider Daily. 2 puffs each nostril (Patient taking differently: 2 sprays into the nostril(s) as directed by provider Daily As Needed. 2 puffs each nostril), Disp: 1 bottle, Rfl: 0  •  furosemide (LASIX) 40 MG tablet, , Disp: , Rfl:   •  Jardiance 10 MG tablet, TAKE 1 TABLET BY MOUTH EVERY DAY  (Patient taking differently: Take  by mouth Daily.), Disp: 30 tablet, Rfl: 3  •  l-methylfolate-algae-B6-B12 (METANX) 3-90.314-2-35 MG capsule capsule, TAKE 1 CAPSULE BY MOUTH TWICE A DAY. (Patient taking differently: Take 1 capsule by mouth Daily As Needed.), Disp: 180 capsule, Rfl: 1  •  Lantus 100 UNIT/ML injection, Up to 50 units daily (Patient taking differently: Inject 25 Units under the skin into the appropriate area as directed Every Night. Up to 50 units daily), Disp: 20 mL, Rfl: 5  •  Lecithin 1200 MG capsule, Take  by mouth Daily., Disp: , Rfl:   •  leuprolide (LUPRON) 22.5 MG injection, Inject 22.5 mg into the appropriate muscle as directed by prescriber Every 3 (Three) Months., Disp: , Rfl:   •  losartan-hydrochlorothiazide (HYZAAR) 100-25 MG per tablet, Take 1  "tablet by mouth Daily., Disp: , Rfl:   •  metFORMIN ER (GLUCOPHAGE-XR) 500 MG 24 hr tablet, Take 1 tablet by mouth 2 (Two) Times a Day., Disp: 60 tablet, Rfl: 5  •  methocarbamol (ROBAXIN) 500 MG tablet, Take 1 tablet by mouth 3 (Three) Times a Day As Needed for Muscle Spasms., Disp: 30 tablet, Rfl: 0  •  metoprolol succinate XL (TOPROL-XL) 100 MG 24 hr tablet, TAKE 1 TABLET BY MOUTH EVERY DAY  (Patient taking differently: Take 100 mg by mouth Daily.), Disp: 90 tablet, Rfl: 0  •  Multiple Vitamins-Minerals (PRESERVISION AREDS 2+MULTI VIT PO), Take  by mouth., Disp: , Rfl:   •  mupirocin (BACTROBAN) 2 % cream, mupirocin calcium 2 % topical cream, Disp: , Rfl:   •  nystatin (MYCOSTATIN) 418815 UNIT/GM powder, Apply  topically to the appropriate area as directed Daily As Needed (rash)., Disp: 60 g, Rfl: 0  •  ondansetron (ZOFRAN) 8 MG tablet, Take 1 tablet by mouth 3 (Three) Times a Day As Needed for Nausea or Vomiting., Disp: 30 tablet, Rfl: 5  •  pravastatin (PRAVACHOL) 40 MG tablet, TAKE ONE TABLET BY MOUTH EVERY NIGHT AT BEDTIME (Patient taking differently: Take 40 mg by mouth Daily.), Disp: 90 tablet, Rfl: 0  •  saccharomyces boulardii (Florastor) 250 MG capsule, Florastor 250 mg capsule, Disp: , Rfl:   •  triamterene-hydrochlorothiazide (DYAZIDE) 37.5-25 MG per capsule, TAKE ONE CAPSULE BY MOUTH DAILY, Disp: 90 capsule, Rfl: 2  •  TRUEplus Insulin Syringe 30G X 5/16\" 0.5 ML misc, For daily insulin injection, Disp: 100 each, Rfl: 3    PHYSICAL EXAMINATION:   There were no vitals taken for this visit.  ECOG1  GENERAL: Age appropriate. No acute distress.   HEENT: Head atraumatic, normocephalic.   NECK: Supple. No JVD. No lymphadenopathy.   LUNGS: Clear to auscultation bilaterally. No wheezing. No rhonchi.   HEART: Regular rate and rhythm. S1, S2, no murmurs.   ABDOMEN: Soft, nontender, nondistended. Bowel sounds positive. No  hepatosplenomegaly.   EXTREMITIES: No clubbing, cyanosis, or edema.   SKIN: No rashes. No " purpura.   NEUROLOGIC: Awake and oriented x3. Strength 5 out of 5 in all muscle groups.     No visits with results within 2 Week(s) from this visit.   Latest known visit with results is:   Infusion on 09/01/2021   Component Date Value Ref Range Status   • Glucose 09/01/2021 147* 65 - 99 mg/dL Final   • BUN 09/01/2021 26* 8 - 23 mg/dL Final   • Creatinine 09/01/2021 1.13  0.76 - 1.27 mg/dL Final   • Sodium 09/01/2021 138  136 - 145 mmol/L Final   • Potassium 09/01/2021 4.0  3.5 - 5.2 mmol/L Final   • Chloride 09/01/2021 99  98 - 107 mmol/L Final   • CO2 09/01/2021 28.8  22.0 - 29.0 mmol/L Final   • Calcium 09/01/2021 10.5  8.6 - 10.5 mg/dL Final   • Total Protein 09/01/2021 7.8  6.0 - 8.5 g/dL Final   • Albumin 09/01/2021 4.40  3.50 - 5.20 g/dL Final   • ALT (SGPT) 09/01/2021 28  1 - 41 U/L Final   • AST (SGOT) 09/01/2021 27  1 - 40 U/L Final   • Alkaline Phosphatase 09/01/2021 82  39 - 117 U/L Final   • Total Bilirubin 09/01/2021 0.8  0.0 - 1.2 mg/dL Final   • eGFR Non  Amer 09/01/2021 62  >60 mL/min/1.73 Final   • Globulin 09/01/2021 3.4  gm/dL Final   • A/G Ratio 09/01/2021 1.3  g/dL Final   • BUN/Creatinine Ratio 09/01/2021 23.0  7.0 - 25.0 Final   • Anion Gap 09/01/2021 10.2  5.0 - 15.0 mmol/L Final   • Magnesium 09/01/2021 1.6  1.6 - 2.4 mg/dL Final   • Phosphorus 09/01/2021 4.5  2.5 - 4.5 mg/dL Final   • WBC 09/01/2021 9.66  3.40 - 10.80 10*3/mm3 Final   • RBC 09/01/2021 4.91  4.14 - 5.80 10*6/mm3 Final   • Hemoglobin 09/01/2021 13.9  13.0 - 17.7 g/dL Final   • Hematocrit 09/01/2021 42.5  37.5 - 51.0 % Final   • MCV 09/01/2021 86.6  79.0 - 97.0 fL Final   • MCH 09/01/2021 28.3  26.6 - 33.0 pg Final   • MCHC 09/01/2021 32.7  31.5 - 35.7 g/dL Final   • RDW 09/01/2021 14.9  12.3 - 15.4 % Final   • RDW-SD 09/01/2021 47.3  37.0 - 54.0 fl Final   • MPV 09/01/2021 9.1  6.0 - 12.0 fL Final   • Platelets 09/01/2021 167  140 - 450 10*3/mm3 Final   • Neutrophil % 09/01/2021 61.3  42.7 - 76.0 % Final   • Lymphocyte  % 09/01/2021 22.2  19.6 - 45.3 % Final   • Monocyte % 09/01/2021 7.2  5.0 - 12.0 % Final   • Eosinophil % 09/01/2021 4.5  0.3 - 6.2 % Final   • Basophil % 09/01/2021 2.7* 0.0 - 1.5 % Final   • Immature Grans % 09/01/2021 2.1* 0.0 - 0.5 % Final   • Neutrophils, Absolute 09/01/2021 5.93  1.70 - 7.00 10*3/mm3 Final   • Lymphocytes, Absolute 09/01/2021 2.14  0.70 - 3.10 10*3/mm3 Final   • Monocytes, Absolute 09/01/2021 0.70  0.10 - 0.90 10*3/mm3 Final   • Eosinophils, Absolute 09/01/2021 0.43* 0.00 - 0.40 10*3/mm3 Final   • Basophils, Absolute 09/01/2021 0.26* 0.00 - 0.20 10*3/mm3 Final   • Immature Grans, Absolute 09/01/2021 0.20* 0.00 - 0.05 10*3/mm3 Final   • nRBC 09/01/2021 0.0  0.0 - 0.2 /100 WBC Final        ASSESSMENT: The patient is a very pleasant 80-year-old gentleman with relapsed  prostate cancer.     PROBLEM LIST:  1. Prostate cancer, initially presented as [id number] status post radical  prostatectomy 2000.  A.  Patient is on Lupron plus Prolia  B.  Tried to add apalutamide second rising PSA patient declined.  2.  Right-sided pulmonary embolisms:  A.  Currently on Eliquis twice a day  3.  Type 2 diabetes  4. Hhypertension  5. Hypercholesterolemia.  6.  Right subsegmental PE:  A. Started on Eliquis twice a day March 2021  7.  Osteopenia  8.  5 cm right kidney lesion    PLAN:  1. I did go over the blood work results in detail with the patient from September 1, 2021.  CBC and CMP were normal.  Unfortunately PSA was not drawn.  I will repeat his labs today.  2. The patient will return to clinic in few weeks to go over results.    3.  Repeated CT scan in September 8, 2021 revealed worsening urothelial thickening right renal pelvis with a new soft tissue masses seen in the region of the right renal pelvis and proximal right ureter consistent with transitional cell carcinoma.  I reviewed the films myself and went over pictures with him.  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  I discussed the case with Dr. Chu from urology who kindly agreed to see the patient.  Meanwhile I will set him up to have whole-body PET scan for staging purposes.  If the patient is reasonable surgical candidate that might be his best surgical option since I do not think is a good candidate for neoadjuvant, adjuvant or palliative chemotherapy.  4.  He will continue with Mo and Trulicity subcu for type 2 diabetes.  5.  We'll continue pravastatin for hypercholesterolemia  6.  Patient need to have 5 year follow-up colonoscopy which would be due 11/2021.  7. We reviewed the potential side effects of the treatment including hot flashes, impotence, joint pain, decrease in bone density, mood or sleep disturbance, and asthenia.  8.  I'll continue the patient on Prolia 60 mg subcutaneous every 6 month.  His next dose will be due today June 2020.  We'll continue calcium with vitamin D daily.  9.  We will continue Eliquis 5 mg twice a day.  I will reduce his dose to 2.5 mg twice a day on return this will be his maintenance dose.  10.  The patient is not interested in cystoscopy at this point.  11.  We will continue Lupron every 3 months, his next treatment will be November 30, 2021.  Patient declined adding apalutamide despite his rising PSA.  Shannan Ramon MD    9/29/2021

## 2021-09-30 ENCOUNTER — TELEPHONE (OUTPATIENT)
Dept: ONCOLOGY | Facility: OTHER | Age: 86
End: 2021-09-30

## 2021-10-18 ENCOUNTER — HOSPITAL ENCOUNTER (OUTPATIENT)
Dept: PET IMAGING | Facility: HOSPITAL | Age: 86
Discharge: HOME | End: 2021-10-18

## 2021-10-18 ENCOUNTER — HOSPITAL ENCOUNTER (OUTPATIENT)
Dept: GENERAL RADIOLOGY | Facility: HOSPITAL | Age: 86
Discharge: HOME | End: 2021-10-18

## 2021-10-18 ENCOUNTER — TRANSCRIBE ORDERS (OUTPATIENT)
Dept: ADMINISTRATIVE | Facility: HOSPITAL | Age: 86
End: 2021-10-18

## 2021-10-18 DIAGNOSIS — J40 BRONCHITIS: Primary | ICD-10-CM

## 2021-10-18 DIAGNOSIS — C66.1 CANCER OF RIGHT URETER (HCC): ICD-10-CM

## 2021-10-18 DIAGNOSIS — C61 PROSTATE CANCER (HCC): ICD-10-CM

## 2021-10-18 LAB — GLUCOSE BLDC GLUCOMTR-MCNC: 145 MG/DL (ref 70–130)

## 2021-10-18 PROCEDURE — 82962 GLUCOSE BLOOD TEST: CPT

## 2021-10-18 PROCEDURE — A9552 F18 FDG: HCPCS | Performed by: INTERNAL MEDICINE

## 2021-10-18 PROCEDURE — 0 FLUDEOXYGLUCOSE F18 SOLUTION: Performed by: INTERNAL MEDICINE

## 2021-10-18 PROCEDURE — 78815 PET IMAGE W/CT SKULL-THIGH: CPT

## 2021-10-18 PROCEDURE — 71046 X-RAY EXAM CHEST 2 VIEWS: CPT

## 2021-10-18 RX ADMIN — FLUDEOXYGLUCOSE F18 1 DOSE: 300 INJECTION INTRAVENOUS at 12:32

## 2021-10-20 ENCOUNTER — OFFICE VISIT (OUTPATIENT)
Dept: ONCOLOGY | Facility: CLINIC | Age: 86
End: 2021-10-20

## 2021-10-20 VITALS
DIASTOLIC BLOOD PRESSURE: 78 MMHG | OXYGEN SATURATION: 90 % | WEIGHT: 193 LBS | RESPIRATION RATE: 14 BRPM | SYSTOLIC BLOOD PRESSURE: 178 MMHG | BODY MASS INDEX: 31.02 KG/M2 | TEMPERATURE: 97.5 F | HEIGHT: 66 IN | HEART RATE: 97 BPM

## 2021-10-20 DIAGNOSIS — C61 PROSTATE CANCER (HCC): Primary | ICD-10-CM

## 2021-10-20 PROCEDURE — 99214 OFFICE O/P EST MOD 30 MIN: CPT | Performed by: INTERNAL MEDICINE

## 2021-10-20 RX ORDER — PEN NEEDLE, DIABETIC 29 G X1/2"
NEEDLE, DISPOSABLE MISCELLANEOUS
COMMUNITY
Start: 2021-10-12 | End: 2021-12-06

## 2021-10-20 NOTE — PROGRESS NOTES
DATE OF VISIT: 10/20/2021     REASON FOR VISIT:   1. Prostate cancer. Presented with stage I, [id number], PSA at diagnosis 3 status  post prostatectomy in 2000.  2. Relapsed disease with rise in PSA gradually over the last couple of years,  most recent PSA 7.1 ng/mL on 01/25/2016.      HISTORY OF PRESENT ILLNESS: The patient is a very pleasant 80-year-old  gentleman with past medical history significant for prostate cancer status post  radical prostatectomy in 2000. The patient never received adjuvant treatments,  neither radiation, GnRH or antiandrogen treatment. The patient's PSA started  to go up gradually over the last 2 years. Patient was referred to me on  06/13/2014 and I did restaging workup that came back negative including bone  scan, CAT scans chest, abdomen and pelvis. Patient is here today in scheduled followup visit.      SUBJECTIVE: Mr. Dietz is here today by himself.  He is complaining of fatigue.  He is anxious about the PET scan results.  No fever or chills.  Is having mild right flank discomfort.    REVIEW OF SYSTEMS: All the other 9 systems are reviewed by me and negative  except what is mentioned in HPI and subjective.      PAST MEDICAL HISTORY/SOCIAL HISTORY/FAMILY HISTORY: Unchanged from my prior  documentation on 06/13/2014.       Current Outpatient Medications:   •  allopurinol (ZYLOPRIM) 300 MG tablet, TAKE 1 TABLET BY MOUTH ONE TIME A DAY  (Patient taking differently: Take 300 mg by mouth Daily.), Disp: 90 tablet, Rfl: 0  •  amLODIPine (NORVASC) 5 MG tablet, Take 1 tablet by mouth Daily., Disp: 30 tablet, Rfl: 1  •  Apalutamide 60 MG tablet, Take 4 tablets by mouth once daily with or without food. Do not crush or chew tablets., Disp: 120 tablet, Rfl: 3  •  apixaban (ELIQUIS) 5 MG tablet tablet, Take 2 tablets by mouth every 12 hours x5 days then decrease to 1 tablet by mouth every 12 hours starting on 4/6/21. (Patient taking differently: Take 5 mg by mouth Every 12 (Twelve) Hours. Take 2  tablets by mouth every 12 hours x5 days then decrease to 1 tablet by mouth every 12 hours starting on 4/6/21.), Disp: 70 tablet, Rfl: 1  •  CRISTINA CONTOUR TEST test strip, , Disp: , Rfl:   •  Cholecalciferol (VITAMIN D3) 2000 UNITS capsule, Take  by mouth 2 (two) times a day., Disp: , Rfl:   •  Cinnamon 500 MG capsule, Take 2,000 mg by mouth Daily., Disp: , Rfl:   •  Coenzyme Q10 100 MG tablet, Take 2 tablets by mouth Daily., Disp: , Rfl:   •  Dulaglutide (Trulicity) 1.5 MG/0.5ML solution pen-injector, Inject 0.75 mg under the skin into the appropriate area as directed 1 (One) Time Per Week., Disp: 6 mL, Rfl: 0  •  fenofibric acid (TRILIPIX) 135 MG capsule delayed-release delayed release capsule, TAKE 1 CAPSULE BY MOUTH ONE TIME A DAY , Disp: 30 capsule, Rfl: 2  •  fluticasone (FLONASE) 50 MCG/ACT nasal spray, 2 sprays into the nostril(s) as directed by provider Daily. 2 puffs each nostril (Patient taking differently: 2 sprays into the nostril(s) as directed by provider Daily As Needed. 2 puffs each nostril), Disp: 1 bottle, Rfl: 0  •  furosemide (LASIX) 40 MG tablet, , Disp: , Rfl:   •  Jardiance 10 MG tablet, TAKE 1 TABLET BY MOUTH EVERY DAY  (Patient taking differently: Take  by mouth Daily.), Disp: 30 tablet, Rfl: 3  •  l-methylfolate-algae-B6-B12 (METANX) 3-90.314-2-35 MG capsule capsule, TAKE 1 CAPSULE BY MOUTH TWICE A DAY. (Patient taking differently: Take 1 capsule by mouth Daily As Needed.), Disp: 180 capsule, Rfl: 1  •  Lantus 100 UNIT/ML injection, Up to 50 units daily (Patient taking differently: Inject 25 Units under the skin into the appropriate area as directed Every Night. Up to 50 units daily), Disp: 20 mL, Rfl: 5  •  Lecithin 1200 MG capsule, Take  by mouth Daily., Disp: , Rfl:   •  leuprolide (LUPRON) 22.5 MG injection, Inject 22.5 mg into the appropriate muscle as directed by prescriber Every 3 (Three) Months., Disp: , Rfl:   •  losartan-hydrochlorothiazide (HYZAAR) 100-25 MG per tablet, Take 1  "tablet by mouth Daily., Disp: , Rfl:   •  metFORMIN ER (GLUCOPHAGE-XR) 500 MG 24 hr tablet, Take 1 tablet by mouth 2 (Two) Times a Day., Disp: 60 tablet, Rfl: 5  •  methocarbamol (ROBAXIN) 500 MG tablet, Take 1 tablet by mouth 3 (Three) Times a Day As Needed for Muscle Spasms., Disp: 30 tablet, Rfl: 0  •  metoprolol succinate XL (TOPROL-XL) 100 MG 24 hr tablet, TAKE 1 TABLET BY MOUTH EVERY DAY  (Patient taking differently: Take 100 mg by mouth Daily.), Disp: 90 tablet, Rfl: 0  •  Multiple Vitamins-Minerals (PRESERVISION AREDS 2+MULTI VIT PO), Take  by mouth., Disp: , Rfl:   •  mupirocin (BACTROBAN) 2 % cream, mupirocin calcium 2 % topical cream, Disp: , Rfl:   •  nystatin (MYCOSTATIN) 900526 UNIT/GM powder, Apply  topically to the appropriate area as directed Daily As Needed (rash)., Disp: 60 g, Rfl: 0  •  ondansetron (ZOFRAN) 8 MG tablet, Take 1 tablet by mouth 3 (Three) Times a Day As Needed for Nausea or Vomiting., Disp: 30 tablet, Rfl: 5  •  pravastatin (PRAVACHOL) 40 MG tablet, TAKE ONE TABLET BY MOUTH EVERY NIGHT AT BEDTIME (Patient taking differently: Take 40 mg by mouth Daily.), Disp: 90 tablet, Rfl: 0  •  saccharomyces boulardii (Florastor) 250 MG capsule, Florastor 250 mg capsule, Disp: , Rfl:   •  triamterene-hydrochlorothiazide (DYAZIDE) 37.5-25 MG per capsule, TAKE ONE CAPSULE BY MOUTH DAILY, Disp: 90 capsule, Rfl: 2  •  TRUEplus Insulin Syringe 30G X 5/16\" 0.5 ML misc, For daily insulin injection, Disp: 100 each, Rfl: 3    PHYSICAL EXAMINATION:   There were no vitals taken for this visit.  ECOG1  GENERAL: Age appropriate. No acute distress.   HEENT: Head atraumatic, normocephalic.   NECK: Supple. No JVD. No lymphadenopathy.   LUNGS: Clear to auscultation bilaterally. No wheezing. No rhonchi.   HEART: Regular rate and rhythm. S1, S2, no murmurs.   ABDOMEN: Soft, nontender, nondistended. Bowel sounds positive. No  hepatosplenomegaly.   EXTREMITIES: No clubbing, cyanosis, or edema.   SKIN: No rashes. No " purpura.   NEUROLOGIC: Awake and oriented x3. Strength 5 out of 5 in all muscle groups.     Hospital Outpatient Visit on 10/18/2021   Component Date Value Ref Range Status   • Glucose 10/18/2021 145* 70 - 130 mg/dL Final    Meter: [id number] : [id number] Day [place]        ASSESSMENT: The patient is a very pleasant 80-year-old gentleman with relapsed  prostate cancer.     PROBLEM LIST:  1. Prostate cancer, initially presented as [id number] status post radical  prostatectomy 2000.  A.  Patient is on Lupron plus Prolia  B.  Tried to add apalutamide second rising PSA patient declined.  2.  Right-sided pulmonary embolisms:  A.  Currently on Eliquis twice a day  3.  Type 2 diabetes  4. Hhypertension  5. Hypercholesterolemia.  6.  Right subsegmental PE:  A. Started on Eliquis twice a day March 2021  7.  Osteopenia  8.  5 cm right kidney lesion    PLAN:  1. I did go over the PET scan result.  I reviewed the films myself and I went over the pictures with him.  He does have localized disease.  2.  The patient will follow up with Dr. Chu for potential right nephrectomy.  3.  The patient is not interested in adding apalutamide at this point.  4.  He will continue with Mo and Trulicity subcu for type 2 diabetes.  5.  We'll continue pravastatin for hypercholesterolemia  6.  Patient need to have 5 year follow-up colonoscopy which would be due 11/2021.  7. We reviewed the potential side effects of the treatment including hot flashes, impotence, joint pain, decrease in bone density, mood or sleep disturbance, and asthenia.  8.  I'll continue the patient on Prolia 60 mg subcutaneous every 6 month.  His next dose will be due today June 2020.  We'll continue calcium with vitamin D daily.  9.  We will continue Eliquis 5 mg twice a day.  I will reduce his dose to 2.5 mg twice a day on return this will be his maintenance dose.  10.  I will continue to monitor the patient blood work including blood counts kidney function liver  function electrolytes and serum PSA.  I shared with the patient his PSA has been slowly increasing.  11.  We will continue Lupron every 3 months, his next treatment will be November 30, 2021.     Shannan Ramon MD    10/20/2021

## 2021-11-10 ENCOUNTER — TRANSCRIBE ORDERS (OUTPATIENT)
Dept: ADMINISTRATIVE | Facility: HOSPITAL | Age: 86
End: 2021-11-10

## 2021-11-10 DIAGNOSIS — C64.9 METASTASIS FROM MALIGNANT TUMOR OF KIDNEY (HCC): Primary | ICD-10-CM

## 2021-11-10 DIAGNOSIS — C79.9 METASTASIS FROM MALIGNANT TUMOR OF KIDNEY (HCC): Primary | ICD-10-CM

## 2021-11-30 ENCOUNTER — HOSPITAL ENCOUNTER (OUTPATIENT)
Dept: NUCLEAR MEDICINE | Facility: HOSPITAL | Age: 86
Discharge: HOME | End: 2021-11-30

## 2021-11-30 DIAGNOSIS — C64.9 METASTASIS FROM MALIGNANT TUMOR OF KIDNEY (HCC): ICD-10-CM

## 2021-11-30 DIAGNOSIS — C79.9 METASTASIS FROM MALIGNANT TUMOR OF KIDNEY (HCC): ICD-10-CM

## 2021-11-30 PROCEDURE — A9562 TC99M MERTIATIDE: HCPCS | Performed by: INTERNAL MEDICINE

## 2021-11-30 PROCEDURE — 0 TECHNETIUM MERTIATIDE: Performed by: INTERNAL MEDICINE

## 2021-11-30 PROCEDURE — 82565 ASSAY OF CREATININE: CPT

## 2021-11-30 PROCEDURE — 25010000002 FUROSEMIDE PER 20 MG: Performed by: INTERNAL MEDICINE

## 2021-11-30 PROCEDURE — 78708 K FLOW/FUNCT IMAGE W/DRUG: CPT

## 2021-11-30 RX ORDER — FUROSEMIDE 10 MG/ML
20 INJECTION INTRAMUSCULAR; INTRAVENOUS ONCE
Status: COMPLETED | OUTPATIENT
Start: 2021-11-30 | End: 2021-11-30

## 2021-11-30 RX ADMIN — TECHNESCAN TC 99M MERTIATIDE 1 DOSE: 1 INJECTION, POWDER, LYOPHILIZED, FOR SOLUTION INTRAVENOUS at 08:50

## 2021-11-30 RX ADMIN — FUROSEMIDE 20 MG: 10 INJECTION, SOLUTION INTRAMUSCULAR; INTRAVENOUS at 08:55

## 2021-12-01 ENCOUNTER — INFUSION (OUTPATIENT)
Dept: ONCOLOGY | Facility: HOSPITAL | Age: 86
End: 2021-12-01

## 2021-12-01 ENCOUNTER — OFFICE VISIT (OUTPATIENT)
Dept: ONCOLOGY | Facility: CLINIC | Age: 86
End: 2021-12-01

## 2021-12-01 VITALS
OXYGEN SATURATION: 98 % | DIASTOLIC BLOOD PRESSURE: 77 MMHG | TEMPERATURE: 98 F | RESPIRATION RATE: 16 BRPM | WEIGHT: 195 LBS | HEART RATE: 94 BPM | HEIGHT: 66 IN | BODY MASS INDEX: 31.34 KG/M2 | SYSTOLIC BLOOD PRESSURE: 163 MMHG

## 2021-12-01 DIAGNOSIS — C61 PROSTATE CANCER (HCC): Primary | ICD-10-CM

## 2021-12-01 LAB
ALBUMIN SERPL-MCNC: 4.3 G/DL (ref 3.5–5.2)
ALBUMIN/GLOB SERPL: 1.3 G/DL
ALP SERPL-CCNC: 61 U/L (ref 39–117)
ALT SERPL W P-5'-P-CCNC: 25 U/L (ref 1–41)
ANION GAP SERPL CALCULATED.3IONS-SCNC: 11.3 MMOL/L (ref 5–15)
AST SERPL-CCNC: 28 U/L (ref 1–40)
BILIRUB SERPL-MCNC: 0.8 MG/DL (ref 0–1.2)
BUN SERPL-MCNC: 19 MG/DL (ref 8–23)
BUN/CREAT SERPL: 20.7 (ref 7–25)
CALCIUM SPEC-SCNC: 10.5 MG/DL (ref 8.6–10.5)
CHLORIDE SERPL-SCNC: 98 MMOL/L (ref 98–107)
CO2 SERPL-SCNC: 28.7 MMOL/L (ref 22–29)
CREAT SERPL-MCNC: 0.92 MG/DL (ref 0.76–1.27)
DEPRECATED RDW RBC AUTO: 45.2 FL (ref 37–54)
ERYTHROCYTE [DISTWIDTH] IN BLOOD BY AUTOMATED COUNT: 13.9 % (ref 12.3–15.4)
GFR SERPL CREATININE-BSD FRML MDRD: 78 ML/MIN/1.73
GLOBULIN UR ELPH-MCNC: 3.3 GM/DL
GLUCOSE SERPL-MCNC: 187 MG/DL (ref 65–99)
HCT VFR BLD AUTO: 44.8 % (ref 37.5–51)
HGB BLD-MCNC: 14.4 G/DL (ref 13–17.7)
MCH RBC QN AUTO: 28.6 PG (ref 26.6–33)
MCHC RBC AUTO-ENTMCNC: 32.1 G/DL (ref 31.5–35.7)
MCV RBC AUTO: 88.9 FL (ref 79–97)
PLATELET # BLD AUTO: 170 10*3/MM3 (ref 140–450)
PMV BLD AUTO: 9.9 FL (ref 6–12)
POTASSIUM SERPL-SCNC: 3.5 MMOL/L (ref 3.5–5.2)
PROT SERPL-MCNC: 7.6 G/DL (ref 6–8.5)
RBC # BLD AUTO: 5.04 10*6/MM3 (ref 4.14–5.8)
SCAN SLIDE: NORMAL
SODIUM SERPL-SCNC: 138 MMOL/L (ref 136–145)
WBC NRBC COR # BLD: 10.41 10*3/MM3 (ref 3.4–10.8)

## 2021-12-01 PROCEDURE — 85007 BL SMEAR W/DIFF WBC COUNT: CPT

## 2021-12-01 PROCEDURE — 36415 COLL VENOUS BLD VENIPUNCTURE: CPT

## 2021-12-01 PROCEDURE — 96402 CHEMO HORMON ANTINEOPL SQ/IM: CPT

## 2021-12-01 PROCEDURE — 85060 BLOOD SMEAR INTERPRETATION: CPT

## 2021-12-01 PROCEDURE — 25010000002 LEUPROLIDE 22.5 MG KIT: Performed by: INTERNAL MEDICINE

## 2021-12-01 PROCEDURE — 85025 COMPLETE CBC W/AUTO DIFF WBC: CPT

## 2021-12-01 PROCEDURE — 84153 ASSAY OF PSA TOTAL: CPT

## 2021-12-01 PROCEDURE — 99214 OFFICE O/P EST MOD 30 MIN: CPT | Performed by: INTERNAL MEDICINE

## 2021-12-01 PROCEDURE — 80053 COMPREHEN METABOLIC PANEL: CPT

## 2021-12-01 RX ADMIN — LEUPROLIDE ACETATE 22.5 MG: KIT SUBCUTANEOUS at 12:32

## 2021-12-02 LAB — PSA SERPL-MCNC: 5.15 NG/ML (ref 0–4)

## 2021-12-03 LAB
BASOPHILS # BLD MANUAL: 0.52 10*3/MM3 (ref 0–0.2)
BASOPHILS NFR BLD MANUAL: 5 % (ref 0–1.5)
CYTOLOGIST CVX/VAG CYTO: NORMAL
EOSINOPHIL # BLD MANUAL: 0.73 10*3/MM3 (ref 0–0.4)
EOSINOPHIL NFR BLD MANUAL: 7 % (ref 0.3–6.2)
LYMPHOCYTES # BLD MANUAL: 1.98 10*3/MM3 (ref 0.7–3.1)
LYMPHOCYTES NFR BLD MANUAL: 7 % (ref 5–12)
METAMYELOCYTES NFR BLD MANUAL: 2 % (ref 0–0)
MONOCYTES # BLD: 0.73 10*3/MM3 (ref 0.1–0.9)
MYELOCYTES NFR BLD MANUAL: 1 % (ref 0–0)
NEUTROPHILS # BLD AUTO: 6.14 10*3/MM3 (ref 1.7–7)
NEUTROPHILS NFR BLD MANUAL: 59 % (ref 42.7–76)
PATH INTERP BLD-IMP: NORMAL
RBC MORPH BLD: NORMAL
SMALL PLATELETS BLD QL SMEAR: ADEQUATE
VARIANT LYMPHS NFR BLD MANUAL: 14 % (ref 19.6–45.3)
VARIANT LYMPHS NFR BLD MANUAL: 5 % (ref 0–5)
WBC MORPH BLD: NORMAL

## 2021-12-03 RX ORDER — SCOLOPAMINE TRANSDERMAL SYSTEM 1 MG/1
1 PATCH, EXTENDED RELEASE TRANSDERMAL CONTINUOUS
Status: CANCELLED | OUTPATIENT
Start: 2021-12-03 | End: 2021-12-06

## 2021-12-05 LAB — CREAT BLDA-MCNC: 0.9 MG/DL (ref 0.6–1.3)

## 2021-12-06 ENCOUNTER — HOSPITAL ENCOUNTER (OUTPATIENT)
Dept: GENERAL RADIOLOGY | Facility: HOSPITAL | Age: 86
Discharge: HOME | End: 2021-12-06

## 2021-12-06 ENCOUNTER — PRE-ADMISSION TESTING (OUTPATIENT)
Dept: PREADMISSION TESTING | Facility: HOSPITAL | Age: 86
End: 2021-12-06

## 2021-12-06 VITALS — HEIGHT: 66 IN | WEIGHT: 196.65 LBS | BODY MASS INDEX: 31.6 KG/M2

## 2021-12-06 DIAGNOSIS — N28.89 RIGHT KIDNEY MASS: ICD-10-CM

## 2021-12-06 LAB
ALBUMIN SERPL-MCNC: 4.4 G/DL (ref 3.5–5.2)
ALBUMIN/GLOB SERPL: 1.5 G/DL
ALP SERPL-CCNC: 75 U/L (ref 39–117)
ALT SERPL W P-5'-P-CCNC: 25 U/L (ref 1–41)
ANION GAP SERPL CALCULATED.3IONS-SCNC: 14 MMOL/L (ref 5–15)
AST SERPL-CCNC: 27 U/L (ref 1–40)
BILIRUB SERPL-MCNC: 1.1 MG/DL (ref 0–1.2)
BILIRUB UR QL STRIP: NEGATIVE
BUN SERPL-MCNC: 18 MG/DL (ref 8–23)
BUN/CREAT SERPL: 19.6 (ref 7–25)
CALCIUM SPEC-SCNC: 10.1 MG/DL (ref 8.6–10.5)
CHLORIDE SERPL-SCNC: 99 MMOL/L (ref 98–107)
CLARITY UR: CLEAR
CO2 SERPL-SCNC: 28 MMOL/L (ref 22–29)
COLOR UR: YELLOW
CREAT SERPL-MCNC: 0.92 MG/DL (ref 0.76–1.27)
DEPRECATED RDW RBC AUTO: 44.3 FL (ref 37–54)
ERYTHROCYTE [DISTWIDTH] IN BLOOD BY AUTOMATED COUNT: 13.8 % (ref 12.3–15.4)
GFR SERPL CREATININE-BSD FRML MDRD: 78 ML/MIN/1.73
GLOBULIN UR ELPH-MCNC: 3 GM/DL
GLUCOSE SERPL-MCNC: 164 MG/DL (ref 65–99)
GLUCOSE UR STRIP-MCNC: NEGATIVE MG/DL
HBA1C MFR BLD: 9.5 % (ref 4.8–5.6)
HCT VFR BLD AUTO: 43.6 % (ref 37.5–51)
HGB BLD-MCNC: 14.5 G/DL (ref 13–17.7)
HGB UR QL STRIP.AUTO: NEGATIVE
KETONES UR QL STRIP: NEGATIVE
LEUKOCYTE ESTERASE UR QL STRIP.AUTO: NEGATIVE
MCH RBC QN AUTO: 29.2 PG (ref 26.6–33)
MCHC RBC AUTO-ENTMCNC: 33.3 G/DL (ref 31.5–35.7)
MCV RBC AUTO: 87.7 FL (ref 79–97)
NITRITE UR QL STRIP: NEGATIVE
PH UR STRIP.AUTO: 5.5 [PH] (ref 5–8)
PLATELET # BLD AUTO: 167 10*3/MM3 (ref 140–450)
PMV BLD AUTO: 10.5 FL (ref 6–12)
POTASSIUM SERPL-SCNC: 3.6 MMOL/L (ref 3.5–5.2)
PROT SERPL-MCNC: 7.4 G/DL (ref 6–8.5)
PROT UR QL STRIP: NEGATIVE
QT INTERVAL: 422 MS
QTC INTERVAL: 521 MS
RBC # BLD AUTO: 4.97 10*6/MM3 (ref 4.14–5.8)
SARS-COV-2 RNA PNL SPEC NAA+PROBE: NOT DETECTED
SODIUM SERPL-SCNC: 141 MMOL/L (ref 136–145)
SP GR UR STRIP: 1.01 (ref 1–1.03)
UROBILINOGEN UR QL STRIP: NORMAL
WBC NRBC COR # BLD: 15.8 10*3/MM3 (ref 3.4–10.8)

## 2021-12-06 PROCEDURE — 85027 COMPLETE CBC AUTOMATED: CPT

## 2021-12-06 PROCEDURE — 80053 COMPREHEN METABOLIC PANEL: CPT

## 2021-12-06 PROCEDURE — 93010 ELECTROCARDIOGRAM REPORT: CPT | Performed by: INTERNAL MEDICINE

## 2021-12-06 PROCEDURE — 81003 URINALYSIS AUTO W/O SCOPE: CPT

## 2021-12-06 PROCEDURE — 36415 COLL VENOUS BLD VENIPUNCTURE: CPT

## 2021-12-06 PROCEDURE — 83036 HEMOGLOBIN GLYCOSYLATED A1C: CPT

## 2021-12-06 PROCEDURE — C9803 HOPD COVID-19 SPEC COLLECT: HCPCS

## 2021-12-06 PROCEDURE — U0004 COV-19 TEST NON-CDC HGH THRU: HCPCS

## 2021-12-06 PROCEDURE — 71046 X-RAY EXAM CHEST 2 VIEWS: CPT

## 2021-12-06 PROCEDURE — 93005 ELECTROCARDIOGRAM TRACING: CPT

## 2021-12-06 RX ORDER — DOXYCYCLINE HYCLATE 100 MG/1
100 CAPSULE ORAL 2 TIMES DAILY
COMMUNITY
End: 2021-12-13 | Stop reason: HOSPADM

## 2021-12-06 NOTE — PAT
An arrival time for procedure was not given during PAT visit. If patient had any questions or concerns about their arrival time, they were instructed to contact their surgeon/physician.  Additionally, if the patient referred to an arrival time that was acquired from their my chart account, patient was encouraged to verify that time with their surgeon/physician.  NO arrival times given in Pre Admission Testing Department.    Patient to apply Chlorhexadine wipes  to surgical area (as instructed) the night before procedure and the AM of procedure. Wipes provided.    Patient viewed general PAT education video as instructed in their preoperative information received from their surgeon.  Patient stated the general PAT education video was viewed in its entirety and survey completed.  Copies of PAT general education handouts (Incentive Spirometry, Meds to Beds Program, Patient Belongings, Pre-op skin preparation instructions, Blood Glucose testing, Visitor policy, Surgery FAQ, Code H) distributed to patient if not printed. Education related to the PAT pass and skin preparation for surgery (if applicable) completed in PAT as a reinforcement to PAT education video. Patient instructed to return PAT pass provided today as well as completed skin preparation sheet (if applicable) on the day of procedure.     Additionally if patient had not viewed video yet but intended to view it at home or in our waiting area, then referred them to the handout with QR code/link provided during PAT visit.  Instructed patient to complete survey after viewing the video in its entirety.  Encouraged patient/family to read PAT general education handouts thoroughly and notify PAT staff with any questions or concerns. Patient verbalized understanding of all information and priority content.    CARDIAC CLEARANCE ON CHART FROM 10/29/21 FROM DR. NORIEGA. PT TO STOP ELIQUIS TWO DAYS PRIOR TO SURGERY. PT HAS STOPPED ELIQUIS. PT DENIES CHEST  PAIN/DYSPNEA.    PT REPORTED TAKING DOXYCYCLINE 100MG BID FOR BRONCHITIS, PRESCRIBED BY PCP DR. TREVIZO. PT REPORTS HAVING 2-3 DAYS LEFT. PT REPORTS INTERMITTENT DRY COUGH CURRENTLY. REPORTED ABOVE, WBC 15.8 AND THAT PT IS CURRENTLY GOING FOR CXR TO [facility] IN DR. DA SILVA'S OFFICE.

## 2021-12-07 ENCOUNTER — ANESTHESIA (OUTPATIENT)
Dept: PERIOP | Facility: HOSPITAL | Age: 86
End: 2021-12-07

## 2021-12-07 ENCOUNTER — ANESTHESIA EVENT CONVERTED (OUTPATIENT)
Dept: ANESTHESIOLOGY | Facility: HOSPITAL | Age: 86
End: 2021-12-07

## 2021-12-07 ENCOUNTER — HOSPITAL ENCOUNTER (INPATIENT)
Facility: HOSPITAL | Age: 86
LOS: 6 days | Discharge: HOME HEALTH | End: 2021-12-13
Attending: UROLOGY | Admitting: UROLOGY

## 2021-12-07 ENCOUNTER — ANESTHESIA EVENT (OUTPATIENT)
Dept: PERIOP | Facility: HOSPITAL | Age: 86
End: 2021-12-07

## 2021-12-07 DIAGNOSIS — E11.65 UNCONTROLLED TYPE 2 DIABETES MELLITUS WITH HYPERGLYCEMIA (HCC): ICD-10-CM

## 2021-12-07 DIAGNOSIS — I10 ESSENTIAL HYPERTENSION: ICD-10-CM

## 2021-12-07 DIAGNOSIS — N28.89 RENAL MASS: ICD-10-CM

## 2021-12-07 DIAGNOSIS — N28.89 RIGHT KIDNEY MASS: Primary | ICD-10-CM

## 2021-12-07 LAB
ANION GAP SERPL CALCULATED.3IONS-SCNC: 11 MMOL/L (ref 5–15)
BUN SERPL-MCNC: 21 MG/DL (ref 8–23)
BUN/CREAT SERPL: 17.5 (ref 7–25)
CALCIUM SPEC-SCNC: 8.6 MG/DL (ref 8.6–10.5)
CHLORIDE SERPL-SCNC: 102 MMOL/L (ref 98–107)
CO2 SERPL-SCNC: 28 MMOL/L (ref 22–29)
CREAT SERPL-MCNC: 1.2 MG/DL (ref 0.76–1.27)
DEPRECATED RDW RBC AUTO: 45.8 FL (ref 37–54)
ERYTHROCYTE [DISTWIDTH] IN BLOOD BY AUTOMATED COUNT: 13.6 % (ref 12.3–15.4)
GFR SERPL CREATININE-BSD FRML MDRD: 58 ML/MIN/1.73
GLUCOSE BLDC GLUCOMTR-MCNC: 138 MG/DL (ref 70–130)
GLUCOSE BLDC GLUCOMTR-MCNC: 201 MG/DL (ref 70–130)
GLUCOSE BLDC GLUCOMTR-MCNC: 224 MG/DL (ref 70–130)
GLUCOSE BLDC GLUCOMTR-MCNC: 236 MG/DL (ref 70–130)
GLUCOSE BLDC GLUCOMTR-MCNC: 275 MG/DL (ref 70–130)
GLUCOSE SERPL-MCNC: 195 MG/DL (ref 65–99)
HCT VFR BLD AUTO: 41.3 % (ref 37.5–51)
HGB BLD-MCNC: 13 G/DL (ref 13–17.7)
MCH RBC QN AUTO: 28.8 PG (ref 26.6–33)
MCHC RBC AUTO-ENTMCNC: 31.5 G/DL (ref 31.5–35.7)
MCV RBC AUTO: 91.4 FL (ref 79–97)
PLATELET # BLD AUTO: 155 10*3/MM3 (ref 140–450)
PMV BLD AUTO: 10.5 FL (ref 6–12)
POTASSIUM SERPL-SCNC: 3.8 MMOL/L (ref 3.5–5.2)
RBC # BLD AUTO: 4.52 10*6/MM3 (ref 4.14–5.8)
SODIUM SERPL-SCNC: 141 MMOL/L (ref 136–145)
WBC NRBC COR # BLD: 11.58 10*3/MM3 (ref 3.4–10.8)

## 2021-12-07 PROCEDURE — 85027 COMPLETE CBC AUTOMATED: CPT | Performed by: UROLOGY

## 2021-12-07 PROCEDURE — 63710000001 ACETAMINOPHEN 325 MG TABLET: Performed by: UROLOGY

## 2021-12-07 PROCEDURE — 50548 LAPARO REMOVE W/URETER: CPT | Performed by: PHYSICIAN ASSISTANT

## 2021-12-07 PROCEDURE — A9270 NON-COVERED ITEM OR SERVICE: HCPCS | Performed by: INTERNAL MEDICINE

## 2021-12-07 PROCEDURE — 25010000002 FENTANYL CITRATE (PF) 50 MCG/ML SOLUTION: Performed by: NURSE ANESTHETIST, CERTIFIED REGISTERED

## 2021-12-07 PROCEDURE — 25010000002 FENTANYL CITRATE (PF) 50 MCG/ML SOLUTION

## 2021-12-07 PROCEDURE — 63710000001 INSULIN DETEMIR PER 5 UNITS: Performed by: INTERNAL MEDICINE

## 2021-12-07 PROCEDURE — 0TT64ZZ RESECTION OF RIGHT URETER, PERCUTANEOUS ENDOSCOPIC APPROACH: ICD-10-PCS | Performed by: UROLOGY

## 2021-12-07 PROCEDURE — 88360 TUMOR IMMUNOHISTOCHEM/MANUAL: CPT | Performed by: UROLOGY

## 2021-12-07 PROCEDURE — 25010000002 DEXAMETHASONE SODIUM PHOSPHATE 10 MG/ML SOLUTION: Performed by: NURSE ANESTHETIST, CERTIFIED REGISTERED

## 2021-12-07 PROCEDURE — 88341 IMHCHEM/IMCYTCHM EA ADD ANTB: CPT | Performed by: UROLOGY

## 2021-12-07 PROCEDURE — 25010000002 SODIUM CHLORIDE 0.9 % WITH KCL 20 MEQ 20-0.9 MEQ/L-% SOLUTION: Performed by: UROLOGY

## 2021-12-07 PROCEDURE — 25010000002 ONDANSETRON PER 1 MG: Performed by: NURSE ANESTHETIST, CERTIFIED REGISTERED

## 2021-12-07 PROCEDURE — 63710000001 OXYCODONE 5 MG TABLET: Performed by: UROLOGY

## 2021-12-07 PROCEDURE — 63710000001 INSULIN LISPRO (HUMAN) PER 5 UNITS: Performed by: INTERNAL MEDICINE

## 2021-12-07 PROCEDURE — 88275 CYTOGENETICS 100-300: CPT

## 2021-12-07 PROCEDURE — 82962 GLUCOSE BLOOD TEST: CPT

## 2021-12-07 PROCEDURE — 0TT04ZZ RESECTION OF RIGHT KIDNEY, PERCUTANEOUS ENDOSCOPIC APPROACH: ICD-10-PCS | Performed by: UROLOGY

## 2021-12-07 PROCEDURE — A9270 NON-COVERED ITEM OR SERVICE: HCPCS | Performed by: UROLOGY

## 2021-12-07 PROCEDURE — 88271 CYTOGENETICS DNA PROBE: CPT

## 2021-12-07 PROCEDURE — 0GT34ZZ RESECTION OF RIGHT ADRENAL GLAND, PERCUTANEOUS ENDOSCOPIC APPROACH: ICD-10-PCS | Performed by: UROLOGY

## 2021-12-07 PROCEDURE — 80048 BASIC METABOLIC PNL TOTAL CA: CPT | Performed by: UROLOGY

## 2021-12-07 PROCEDURE — 25010000002 PROPOFOL 10 MG/ML EMULSION: Performed by: NURSE ANESTHETIST, CERTIFIED REGISTERED

## 2021-12-07 PROCEDURE — 88342 IMHCHEM/IMCYTCHM 1ST ANTB: CPT | Performed by: UROLOGY

## 2021-12-07 PROCEDURE — 88305 TISSUE EXAM BY PATHOLOGIST: CPT | Performed by: UROLOGY

## 2021-12-07 PROCEDURE — 0TJB8ZZ INSPECTION OF BLADDER, VIA NATURAL OR ARTIFICIAL OPENING ENDOSCOPIC: ICD-10-PCS | Performed by: UROLOGY

## 2021-12-07 PROCEDURE — 88307 TISSUE EXAM BY PATHOLOGIST: CPT | Performed by: UROLOGY

## 2021-12-07 PROCEDURE — C1889 IMPLANT/INSERT DEVICE, NOC: HCPCS | Performed by: UROLOGY

## 2021-12-07 DEVICE — CLIP LIG HEMOLOK PA LG 6CT PRP: Type: IMPLANTABLE DEVICE | Site: ABDOMEN | Status: FUNCTIONAL

## 2021-12-07 DEVICE — ENDOSCOPIC LINEAR CUTTER RELOADS GOLD 3.8 MM
Type: IMPLANTABLE DEVICE | Site: ABDOMEN | Status: FUNCTIONAL
Brand: ECHELON; ENDOPATH

## 2021-12-07 RX ORDER — MIDAZOLAM HYDROCHLORIDE 1 MG/ML
0.5 INJECTION INTRAMUSCULAR; INTRAVENOUS
Status: DISCONTINUED | OUTPATIENT
Start: 2021-12-07 | End: 2021-12-07 | Stop reason: HOSPADM

## 2021-12-07 RX ORDER — FENTANYL CITRATE 50 UG/ML
INJECTION, SOLUTION INTRAMUSCULAR; INTRAVENOUS
Status: COMPLETED
Start: 2021-12-07 | End: 2021-12-07

## 2021-12-07 RX ORDER — GABAPENTIN 100 MG/1
100 CAPSULE ORAL 3 TIMES DAILY
Status: COMPLETED | OUTPATIENT
Start: 2021-12-07 | End: 2021-12-09

## 2021-12-07 RX ORDER — NALOXONE HCL 0.4 MG/ML
0.4 VIAL (ML) INJECTION AS NEEDED
Status: DISCONTINUED | OUTPATIENT
Start: 2021-12-07 | End: 2021-12-07 | Stop reason: HOSPADM

## 2021-12-07 RX ORDER — LIDOCAINE HYDROCHLORIDE 10 MG/ML
0.5 INJECTION, SOLUTION EPIDURAL; INFILTRATION; INTRACAUDAL; PERINEURAL ONCE AS NEEDED
Status: COMPLETED | OUTPATIENT
Start: 2021-12-07 | End: 2021-12-07

## 2021-12-07 RX ORDER — HYDROCODONE BITARTRATE AND ACETAMINOPHEN 5; 325 MG/1; MG/1
1 TABLET ORAL ONCE AS NEEDED
Status: DISCONTINUED | OUTPATIENT
Start: 2021-12-07 | End: 2021-12-07 | Stop reason: HOSPADM

## 2021-12-07 RX ORDER — BUPIVACAINE HCL/0.9 % NACL/PF 0.125 %
PLASTIC BAG, INJECTION (ML) EPIDURAL AS NEEDED
Status: DISCONTINUED | OUTPATIENT
Start: 2021-12-07 | End: 2021-12-07 | Stop reason: SURG

## 2021-12-07 RX ORDER — DROPERIDOL 2.5 MG/ML
0.62 INJECTION, SOLUTION INTRAMUSCULAR; INTRAVENOUS AS NEEDED
Status: DISCONTINUED | OUTPATIENT
Start: 2021-12-07 | End: 2021-12-07 | Stop reason: HOSPADM

## 2021-12-07 RX ORDER — ACETAMINOPHEN 325 MG/1
650 TABLET ORAL EVERY 6 HOURS
Status: COMPLETED | OUTPATIENT
Start: 2021-12-07 | End: 2021-12-09

## 2021-12-07 RX ORDER — METOPROLOL SUCCINATE 100 MG/1
100 TABLET, EXTENDED RELEASE ORAL DAILY
Status: DISCONTINUED | OUTPATIENT
Start: 2021-12-07 | End: 2021-12-13 | Stop reason: HOSPADM

## 2021-12-07 RX ORDER — ALLOPURINOL 300 MG/1
300 TABLET ORAL DAILY
Status: DISCONTINUED | OUTPATIENT
Start: 2021-12-07 | End: 2021-12-13 | Stop reason: HOSPADM

## 2021-12-07 RX ORDER — DOCUSATE SODIUM 100 MG/1
100 CAPSULE, LIQUID FILLED ORAL 2 TIMES DAILY PRN
Status: DISCONTINUED | OUTPATIENT
Start: 2021-12-07 | End: 2021-12-13 | Stop reason: HOSPADM

## 2021-12-07 RX ORDER — FAMOTIDINE 20 MG/1
20 TABLET, FILM COATED ORAL ONCE
Status: COMPLETED | OUTPATIENT
Start: 2021-12-07 | End: 2021-12-07

## 2021-12-07 RX ORDER — FENTANYL CITRATE 50 UG/ML
INJECTION, SOLUTION INTRAMUSCULAR; INTRAVENOUS AS NEEDED
Status: DISCONTINUED | OUTPATIENT
Start: 2021-12-07 | End: 2021-12-07 | Stop reason: SURG

## 2021-12-07 RX ORDER — NALOXONE HCL 0.4 MG/ML
0.1 VIAL (ML) INJECTION
Status: DISCONTINUED | OUTPATIENT
Start: 2021-12-07 | End: 2021-12-13 | Stop reason: HOSPADM

## 2021-12-07 RX ORDER — FLUTICASONE PROPIONATE 50 MCG
2 SPRAY, SUSPENSION (ML) NASAL DAILY
Status: DISCONTINUED | OUTPATIENT
Start: 2021-12-07 | End: 2021-12-13 | Stop reason: HOSPADM

## 2021-12-07 RX ORDER — NITROFURANTOIN 25; 75 MG/1; MG/1
100 CAPSULE ORAL 2 TIMES DAILY
Qty: 14 CAPSULE | Refills: 0 | Status: SHIPPED | OUTPATIENT
Start: 2021-12-07 | End: 2022-02-02

## 2021-12-07 RX ORDER — POLYETHYLENE GLYCOL 3350 17 G/17G
17 POWDER, FOR SOLUTION ORAL DAILY
Qty: 238 G | Refills: 0 | Status: ON HOLD | OUTPATIENT
Start: 2021-12-07 | End: 2022-06-03

## 2021-12-07 RX ORDER — ACETAMINOPHEN 160 MG/5ML
650 SOLUTION ORAL EVERY 6 HOURS
Status: COMPLETED | OUTPATIENT
Start: 2021-12-07 | End: 2021-12-09

## 2021-12-07 RX ORDER — SODIUM CHLORIDE 0.9 % (FLUSH) 0.9 %
3-10 SYRINGE (ML) INJECTION AS NEEDED
Status: DISCONTINUED | OUTPATIENT
Start: 2021-12-07 | End: 2021-12-07 | Stop reason: HOSPADM

## 2021-12-07 RX ORDER — DEXAMETHASONE SODIUM PHOSPHATE 10 MG/ML
INJECTION, SOLUTION INTRAMUSCULAR; INTRAVENOUS
Status: COMPLETED | OUTPATIENT
Start: 2021-12-07 | End: 2021-12-07

## 2021-12-07 RX ORDER — DOCUSATE SODIUM 100 MG/1
100 CAPSULE, LIQUID FILLED ORAL DAILY PRN
Qty: 30 CAPSULE | Refills: 1 | Status: SHIPPED | OUTPATIENT
Start: 2021-12-07 | End: 2022-12-07

## 2021-12-07 RX ORDER — LIDOCAINE HYDROCHLORIDE 20 MG/ML
JELLY TOPICAL AS NEEDED
Status: DISCONTINUED | OUTPATIENT
Start: 2021-12-07 | End: 2021-12-07 | Stop reason: SURG

## 2021-12-07 RX ORDER — CLINDAMYCIN PHOSPHATE 300 MG/50ML
300 INJECTION INTRAVENOUS EVERY 8 HOURS
Status: COMPLETED | OUTPATIENT
Start: 2021-12-07 | End: 2021-12-08

## 2021-12-07 RX ORDER — SODIUM CHLORIDE 0.9 % (FLUSH) 0.9 %
10 SYRINGE (ML) INJECTION AS NEEDED
Status: DISCONTINUED | OUTPATIENT
Start: 2021-12-07 | End: 2021-12-07 | Stop reason: HOSPADM

## 2021-12-07 RX ORDER — GABAPENTIN 300 MG/1
600 CAPSULE ORAL ONCE
Status: COMPLETED | OUTPATIENT
Start: 2021-12-07 | End: 2021-12-07

## 2021-12-07 RX ORDER — NICOTINE POLACRILEX 4 MG
15 LOZENGE BUCCAL
Status: DISCONTINUED | OUTPATIENT
Start: 2021-12-07 | End: 2021-12-08 | Stop reason: SDUPTHER

## 2021-12-07 RX ORDER — ONDANSETRON 2 MG/ML
4 INJECTION INTRAMUSCULAR; INTRAVENOUS ONCE AS NEEDED
Status: DISCONTINUED | OUTPATIENT
Start: 2021-12-07 | End: 2021-12-07 | Stop reason: HOSPADM

## 2021-12-07 RX ORDER — HYDROMORPHONE HYDROCHLORIDE 1 MG/ML
0.5 INJECTION, SOLUTION INTRAMUSCULAR; INTRAVENOUS; SUBCUTANEOUS
Status: DISCONTINUED | OUTPATIENT
Start: 2021-12-07 | End: 2021-12-13 | Stop reason: HOSPADM

## 2021-12-07 RX ORDER — ONDANSETRON 2 MG/ML
INJECTION INTRAMUSCULAR; INTRAVENOUS AS NEEDED
Status: DISCONTINUED | OUTPATIENT
Start: 2021-12-07 | End: 2021-12-07 | Stop reason: SURG

## 2021-12-07 RX ORDER — PANTOPRAZOLE SODIUM 40 MG/1
40 TABLET, DELAYED RELEASE ORAL
Status: DISCONTINUED | OUTPATIENT
Start: 2021-12-08 | End: 2021-12-13 | Stop reason: HOSPADM

## 2021-12-07 RX ORDER — SODIUM CHLORIDE AND POTASSIUM CHLORIDE 150; 900 MG/100ML; MG/100ML
100 INJECTION, SOLUTION INTRAVENOUS CONTINUOUS
Status: DISCONTINUED | OUTPATIENT
Start: 2021-12-07 | End: 2021-12-08

## 2021-12-07 RX ORDER — HYDROMORPHONE HYDROCHLORIDE 1 MG/ML
0.5 INJECTION, SOLUTION INTRAMUSCULAR; INTRAVENOUS; SUBCUTANEOUS
Status: DISCONTINUED | OUTPATIENT
Start: 2021-12-07 | End: 2021-12-07 | Stop reason: HOSPADM

## 2021-12-07 RX ORDER — MELOXICAM 15 MG/1
15 TABLET ORAL ONCE
Status: COMPLETED | OUTPATIENT
Start: 2021-12-07 | End: 2021-12-07

## 2021-12-07 RX ORDER — HYDRALAZINE HYDROCHLORIDE 20 MG/ML
5 INJECTION INTRAMUSCULAR; INTRAVENOUS
Status: DISCONTINUED | OUTPATIENT
Start: 2021-12-07 | End: 2021-12-07 | Stop reason: HOSPADM

## 2021-12-07 RX ORDER — LABETALOL HYDROCHLORIDE 5 MG/ML
10 INJECTION, SOLUTION INTRAVENOUS EVERY 4 HOURS PRN
Status: DISCONTINUED | OUTPATIENT
Start: 2021-12-07 | End: 2021-12-13 | Stop reason: HOSPADM

## 2021-12-07 RX ORDER — MAGNESIUM HYDROXIDE 1200 MG/15ML
LIQUID ORAL AS NEEDED
Status: DISCONTINUED | OUTPATIENT
Start: 2021-12-07 | End: 2021-12-07 | Stop reason: HOSPADM

## 2021-12-07 RX ORDER — SODIUM CHLORIDE, SODIUM LACTATE, POTASSIUM CHLORIDE, CALCIUM CHLORIDE 600; 310; 30; 20 MG/100ML; MG/100ML; MG/100ML; MG/100ML
9 INJECTION, SOLUTION INTRAVENOUS CONTINUOUS
Status: DISCONTINUED | OUTPATIENT
Start: 2021-12-07 | End: 2021-12-13

## 2021-12-07 RX ORDER — SODIUM CHLORIDE 0.9 % (FLUSH) 0.9 %
3 SYRINGE (ML) INJECTION EVERY 12 HOURS SCHEDULED
Status: DISCONTINUED | OUTPATIENT
Start: 2021-12-07 | End: 2021-12-07 | Stop reason: HOSPADM

## 2021-12-07 RX ORDER — HYDROCODONE BITARTRATE AND ACETAMINOPHEN 7.5; 325 MG/1; MG/1
1 TABLET ORAL EVERY 6 HOURS PRN
Qty: 30 TABLET | Refills: 0 | Status: ON HOLD | OUTPATIENT
Start: 2021-12-07 | End: 2022-06-03

## 2021-12-07 RX ORDER — TRIAMTERENE AND HYDROCHLOROTHIAZIDE 37.5; 25 MG/1; MG/1
1 TABLET ORAL DAILY
Status: DISCONTINUED | OUTPATIENT
Start: 2021-12-07 | End: 2021-12-13 | Stop reason: HOSPADM

## 2021-12-07 RX ORDER — AMLODIPINE BESYLATE 5 MG/1
5 TABLET ORAL DAILY
Status: DISCONTINUED | OUTPATIENT
Start: 2021-12-07 | End: 2021-12-13 | Stop reason: HOSPADM

## 2021-12-07 RX ORDER — FAMOTIDINE 10 MG/ML
20 INJECTION, SOLUTION INTRAVENOUS ONCE
Status: CANCELLED | OUTPATIENT
Start: 2021-12-07 | End: 2021-12-07

## 2021-12-07 RX ORDER — PROPOFOL 10 MG/ML
VIAL (ML) INTRAVENOUS CONTINUOUS PRN
Status: DISCONTINUED | OUTPATIENT
Start: 2021-12-07 | End: 2021-12-07 | Stop reason: SURG

## 2021-12-07 RX ORDER — MEPERIDINE HYDROCHLORIDE 25 MG/ML
12.5 INJECTION INTRAMUSCULAR; INTRAVENOUS; SUBCUTANEOUS
Status: DISCONTINUED | OUTPATIENT
Start: 2021-12-07 | End: 2021-12-07 | Stop reason: HOSPADM

## 2021-12-07 RX ORDER — DEXTROSE MONOHYDRATE 25 G/50ML
25 INJECTION, SOLUTION INTRAVENOUS
Status: DISCONTINUED | OUTPATIENT
Start: 2021-12-07 | End: 2021-12-08 | Stop reason: SDUPTHER

## 2021-12-07 RX ORDER — LIDOCAINE HYDROCHLORIDE 10 MG/ML
INJECTION, SOLUTION EPIDURAL; INFILTRATION; INTRACAUDAL; PERINEURAL AS NEEDED
Status: DISCONTINUED | OUTPATIENT
Start: 2021-12-07 | End: 2021-12-07 | Stop reason: SURG

## 2021-12-07 RX ORDER — FENTANYL CITRATE 50 UG/ML
50 INJECTION, SOLUTION INTRAMUSCULAR; INTRAVENOUS
Status: DISCONTINUED | OUTPATIENT
Start: 2021-12-07 | End: 2021-12-07 | Stop reason: HOSPADM

## 2021-12-07 RX ORDER — ONDANSETRON 4 MG/1
4 TABLET, FILM COATED ORAL EVERY 6 HOURS PRN
Status: DISCONTINUED | OUTPATIENT
Start: 2021-12-07 | End: 2021-12-13 | Stop reason: HOSPADM

## 2021-12-07 RX ORDER — LABETALOL HYDROCHLORIDE 5 MG/ML
5 INJECTION, SOLUTION INTRAVENOUS
Status: DISCONTINUED | OUTPATIENT
Start: 2021-12-07 | End: 2021-12-07 | Stop reason: HOSPADM

## 2021-12-07 RX ORDER — PROMETHAZINE HYDROCHLORIDE 25 MG/1
25 TABLET ORAL ONCE AS NEEDED
Status: DISCONTINUED | OUTPATIENT
Start: 2021-12-07 | End: 2021-12-07 | Stop reason: HOSPADM

## 2021-12-07 RX ORDER — ACETAMINOPHEN 500 MG
1000 TABLET ORAL ONCE
Status: COMPLETED | OUTPATIENT
Start: 2021-12-07 | End: 2021-12-07

## 2021-12-07 RX ORDER — SODIUM CHLORIDE 0.9 % (FLUSH) 0.9 %
10 SYRINGE (ML) INJECTION EVERY 12 HOURS SCHEDULED
Status: DISCONTINUED | OUTPATIENT
Start: 2021-12-07 | End: 2021-12-07 | Stop reason: HOSPADM

## 2021-12-07 RX ORDER — IPRATROPIUM BROMIDE AND ALBUTEROL SULFATE 2.5; .5 MG/3ML; MG/3ML
3 SOLUTION RESPIRATORY (INHALATION) ONCE AS NEEDED
Status: DISCONTINUED | OUTPATIENT
Start: 2021-12-07 | End: 2021-12-07 | Stop reason: HOSPADM

## 2021-12-07 RX ORDER — OXYCODONE HYDROCHLORIDE 5 MG/1
5 TABLET ORAL EVERY 4 HOURS PRN
Status: DISCONTINUED | OUTPATIENT
Start: 2021-12-07 | End: 2021-12-13 | Stop reason: HOSPADM

## 2021-12-07 RX ORDER — BUPIVACAINE HYDROCHLORIDE 2.5 MG/ML
INJECTION, SOLUTION EPIDURAL; INFILTRATION; INTRACAUDAL
Status: COMPLETED | OUTPATIENT
Start: 2021-12-07 | End: 2021-12-07

## 2021-12-07 RX ORDER — ROCURONIUM BROMIDE 10 MG/ML
INJECTION, SOLUTION INTRAVENOUS AS NEEDED
Status: DISCONTINUED | OUTPATIENT
Start: 2021-12-07 | End: 2021-12-07 | Stop reason: SURG

## 2021-12-07 RX ORDER — ONDANSETRON 2 MG/ML
4 INJECTION INTRAMUSCULAR; INTRAVENOUS EVERY 6 HOURS PRN
Status: DISCONTINUED | OUTPATIENT
Start: 2021-12-07 | End: 2021-12-13 | Stop reason: HOSPADM

## 2021-12-07 RX ORDER — NICOTINE POLACRILEX 4 MG
15 LOZENGE BUCCAL
Status: DISCONTINUED | OUTPATIENT
Start: 2021-12-07 | End: 2021-12-13 | Stop reason: HOSPADM

## 2021-12-07 RX ORDER — ACETAMINOPHEN 650 MG/1
650 SUPPOSITORY RECTAL EVERY 6 HOURS
Status: COMPLETED | OUTPATIENT
Start: 2021-12-07 | End: 2021-12-09

## 2021-12-07 RX ORDER — DROPERIDOL 2.5 MG/ML
0.62 INJECTION, SOLUTION INTRAMUSCULAR; INTRAVENOUS ONCE AS NEEDED
Status: DISCONTINUED | OUTPATIENT
Start: 2021-12-07 | End: 2021-12-07 | Stop reason: HOSPADM

## 2021-12-07 RX ORDER — TRIAMTERENE AND HYDROCHLOROTHIAZIDE 75; 50 MG/1; MG/1
1 TABLET ORAL DAILY
Status: ON HOLD | COMMUNITY
End: 2022-06-03

## 2021-12-07 RX ORDER — PROMETHAZINE HYDROCHLORIDE 25 MG/1
25 SUPPOSITORY RECTAL ONCE AS NEEDED
Status: DISCONTINUED | OUTPATIENT
Start: 2021-12-07 | End: 2021-12-07 | Stop reason: HOSPADM

## 2021-12-07 RX ORDER — SODIUM CHLORIDE 9 MG/ML
INJECTION, SOLUTION INTRAVENOUS AS NEEDED
Status: DISCONTINUED | OUTPATIENT
Start: 2021-12-07 | End: 2021-12-07 | Stop reason: HOSPADM

## 2021-12-07 RX ORDER — DEXTROSE MONOHYDRATE 25 G/50ML
25 INJECTION, SOLUTION INTRAVENOUS
Status: DISCONTINUED | OUTPATIENT
Start: 2021-12-07 | End: 2021-12-13 | Stop reason: HOSPADM

## 2021-12-07 RX ADMIN — ACETAMINOPHEN 650 MG: 325 TABLET, FILM COATED ORAL at 14:09

## 2021-12-07 RX ADMIN — ONDANSETRON 4 MG: 2 INJECTION INTRAMUSCULAR; INTRAVENOUS at 09:49

## 2021-12-07 RX ADMIN — GABAPENTIN 100 MG: 100 CAPSULE ORAL at 17:21

## 2021-12-07 RX ADMIN — Medication 100 MCG: at 10:05

## 2021-12-07 RX ADMIN — Medication 100 MCG: at 09:50

## 2021-12-07 RX ADMIN — FENTANYL CITRATE 50 MCG: 50 INJECTION, SOLUTION INTRAMUSCULAR; INTRAVENOUS at 12:39

## 2021-12-07 RX ADMIN — ACETAMINOPHEN 650 MG: 325 TABLET, FILM COATED ORAL at 20:39

## 2021-12-07 RX ADMIN — ROCURONIUM BROMIDE 50 MG: 10 INJECTION, SOLUTION INTRAVENOUS at 09:45

## 2021-12-07 RX ADMIN — DEXAMETHASONE SODIUM PHOSPHATE 6 MG: 10 INJECTION, SOLUTION INTRAMUSCULAR; INTRAVENOUS at 09:49

## 2021-12-07 RX ADMIN — ACETAMINOPHEN 1000 MG: 500 TABLET ORAL at 08:19

## 2021-12-07 RX ADMIN — AZTREONAM 2 G: 2 INJECTION, POWDER, LYOPHILIZED, FOR SOLUTION INTRAMUSCULAR; INTRAVENOUS at 17:21

## 2021-12-07 RX ADMIN — FENTANYL CITRATE 100 MCG: 50 INJECTION, SOLUTION INTRAMUSCULAR; INTRAVENOUS at 09:45

## 2021-12-07 RX ADMIN — FENTANYL CITRATE 50 MCG: 50 INJECTION, SOLUTION INTRAMUSCULAR; INTRAVENOUS at 12:28

## 2021-12-07 RX ADMIN — Medication 100 MCG: at 09:51

## 2021-12-07 RX ADMIN — FAMOTIDINE 20 MG: 20 TABLET ORAL at 08:19

## 2021-12-07 RX ADMIN — MELOXICAM 15 MG: 15 TABLET ORAL at 08:19

## 2021-12-07 RX ADMIN — SUGAMMADEX 200 MG: 100 INJECTION, SOLUTION INTRAVENOUS at 11:36

## 2021-12-07 RX ADMIN — SODIUM CHLORIDE, POTASSIUM CHLORIDE, SODIUM LACTATE AND CALCIUM CHLORIDE: 600; 310; 30; 20 INJECTION, SOLUTION INTRAVENOUS at 11:02

## 2021-12-07 RX ADMIN — DEXAMETHASONE SODIUM PHOSPHATE 4 MG: 10 INJECTION, SOLUTION INTRAMUSCULAR; INTRAVENOUS at 09:47

## 2021-12-07 RX ADMIN — GABAPENTIN 600 MG: 300 CAPSULE ORAL at 08:19

## 2021-12-07 RX ADMIN — LIDOCAINE HYDROCHLORIDE 0.2 ML: 10 INJECTION, SOLUTION EPIDURAL; INFILTRATION; INTRACAUDAL; PERINEURAL at 07:40

## 2021-12-07 RX ADMIN — Medication 100 MCG: at 10:02

## 2021-12-07 RX ADMIN — CLINDAMYCIN PHOSPHATE 300 MG: 300 INJECTION, SOLUTION INTRAVENOUS at 22:08

## 2021-12-07 RX ADMIN — GABAPENTIN 100 MG: 100 CAPSULE ORAL at 20:39

## 2021-12-07 RX ADMIN — INSULIN LISPRO 4 UNITS: 100 INJECTION, SOLUTION INTRAVENOUS; SUBCUTANEOUS at 22:08

## 2021-12-07 RX ADMIN — PROPOFOL 130 MG: 10 INJECTION, EMULSION INTRAVENOUS at 09:45

## 2021-12-07 RX ADMIN — BUPIVACAINE HYDROCHLORIDE 60 ML: 2.5 INJECTION, SOLUTION EPIDURAL; INFILTRATION; INTRACAUDAL; PERINEURAL at 09:47

## 2021-12-07 RX ADMIN — ROCURONIUM BROMIDE 10 MG: 10 INJECTION, SOLUTION INTRAVENOUS at 11:27

## 2021-12-07 RX ADMIN — POTASSIUM CHLORIDE AND SODIUM CHLORIDE 100 ML/HR: 900; 150 INJECTION, SOLUTION INTRAVENOUS at 13:12

## 2021-12-07 RX ADMIN — OXYCODONE 5 MG: 5 TABLET ORAL at 14:09

## 2021-12-07 RX ADMIN — LIDOCAINE HYDROCHLORIDE 2 ML: 20 JELLY TOPICAL at 09:46

## 2021-12-07 RX ADMIN — INSULIN LISPRO 5 UNITS: 100 INJECTION, SOLUTION INTRAVENOUS; SUBCUTANEOUS at 17:21

## 2021-12-07 RX ADMIN — LIDOCAINE HYDROCHLORIDE 50 MG: 10 INJECTION, SOLUTION EPIDURAL; INFILTRATION; INTRACAUDAL; PERINEURAL at 09:45

## 2021-12-07 RX ADMIN — INSULIN DETEMIR 15 UNITS: 100 INJECTION, SOLUTION SUBCUTANEOUS at 14:09

## 2021-12-07 RX ADMIN — PROPOFOL 25 MCG/KG/MIN: 10 INJECTION, EMULSION INTRAVENOUS at 09:48

## 2021-12-07 RX ADMIN — SODIUM CHLORIDE, POTASSIUM CHLORIDE, SODIUM LACTATE AND CALCIUM CHLORIDE 9 ML/HR: 600; 310; 30; 20 INJECTION, SOLUTION INTRAVENOUS at 07:40

## 2021-12-07 RX ADMIN — INSULIN LISPRO 3 UNITS: 100 INJECTION, SOLUTION INTRAVENOUS; SUBCUTANEOUS at 17:21

## 2021-12-07 RX ADMIN — CLINDAMYCIN PHOSPHATE 300 MG: 300 INJECTION, SOLUTION INTRAVENOUS at 14:09

## 2021-12-07 NOTE — ANESTHESIA PROCEDURE NOTES
Peripheral Block      Patient reassessed immediately prior to procedure    Patient location during procedure: OR  Reason for block: at surgeon's request and post-op pain management  Performed by  Anesthesiologist: Lloyd Nicolas Jr., MD  Preanesthetic Checklist  Completed: patient identified, IV checked, site marked, risks and benefits discussed, surgical consent, monitors and equipment checked, pre-op evaluation and timeout performed  Prep:  Pt Position: supine  Sterile barriers:cap, gloves, sterile barriers and mask  Prep: ChloraPrep  Patient monitoring: blood pressure monitoring, continuous pulse oximetry and EKG  Procedure    Sedation: yes  Performed under: general  Guidance:ultrasound guided  Images:still images obtained, printed/placed on chart    Laterality:Bilateral  Block Type:TAP  Injection Technique:single-shot  Needle Type:short-bevel and echogenic  Needle Gauge:20 G  Resistance on Injection: none    Medications Used: dexamethasone sodium phosphate injection, 4 mg  bupivacaine PF (MARCAINE) 0.25 % injection, 60 mL  Med administered at 12/7/2021 9:47 AM      Medications  Comment:Block Injection:  LA dose divided between Right and Left block        Post Assessment  Injection Assessment: negative aspiration for heme, incremental injection and no paresthesia on injection  Patient Tolerance:comfortable throughout block  Complications:no  Additional Notes      Under Ultrasound guidance, a BBraun 4inch 360 degree needle was advanced with Normal Saline hydro dissection of tissue.  The Internal Oblique and Transversus Abdominus muscles where visualized.  At or before the aponeurosis of Internal Oblique, local anesthetic spread was visualized in the Transversus Abdominus Plane. Injection was made incrementally with aspiration every 5 mls.  There was no  intravascular injection,  injection pressure was normal, there was no neural injection, and the procedure was completed without difficulty.  Thank  You.

## 2021-12-07 NOTE — H&P
Patient Care Team:      Chief complaint:  Right renal mass    Subjective:    Patient is a 86 y.o.male presents with history of prostate cancer stage I, [id number], status post prostatectomy in 2000.  He continues to be followed by oncology due to recurrent prostate cancer. He recently underwent CT scan of the abdomen and pelvis was performed September 2021 which revealed soft tissue mass in the region of the right renal pelvis and proximal ureter. This was followed up with PET scan imaging October 18, 2021 confirm hypermetabolic soft tissue mass in the region of the right renal pelvis. Patient denies flank pain. He denies hematuria. He denies previous history of bladder cancer. See office note dated 10/27/2021.  The patient is here today for scheduled and consented RIGHT NEPHROURETERECTOMY LAPAROSCOPIC WITH DAVINCI ROBOT; CYSTOSCOPY FLEXIBLE      Review of Systems:  General ROS: negative for fever, chills, weakness, dizziness, headache, fatigue, weight changes  Cardiovascular ROS: no chest pain or dyspnea on exertion  Respiratory ROS: no cough, shortness of breath, or wheezing  GI ROS: no abdominal pain/discomfort, nausea, vomiting or diarrhea   ROS: no dysuria, hematuria or complaints  Skin ROS: no itching, rash or open wounds.        Allergies:   Allergies   Allergen Reactions   • Ampicillin Anaphylaxis   • Medrol [Methylprednisolone] Unknown - High Severity     Made his blood sugar get really high, can't take this   • Myrbetriq [Mirabegron] Unknown - High Severity     High BP   • Penicillins Anaphylaxis   • Bee Venom Swelling   Latex: no known allergy  Contrast Dye:  no known allergy      Home Meds    Medications Prior to Admission   Medication Sig Dispense Refill Last Dose   • allopurinol (ZYLOPRIM) 300 MG tablet TAKE 1 TABLET BY MOUTH ONE TIME A DAY  (Patient taking differently: Take 300 mg by mouth Daily.) 90 tablet 0    • amLODIPine (NORVASC) 5 MG tablet Take 1 tablet by mouth Daily. 30 tablet 1    •  Apalutamide 60 MG tablet Take 4 tablets by mouth once daily with or without food. Do not crush or chew tablets. (Patient taking differently: Take 240 mg by mouth Daily. NOT SURE IF HE IS TAKING) 120 tablet 3    • apixaban (ELIQUIS) 5 MG tablet tablet Take 2 tablets by mouth every 12 hours x5 days then decrease to 1 tablet by mouth every 12 hours starting on 4/6/21. (Patient taking differently: Take 5 mg by mouth Every 12 (Twelve) Hours.) 70 tablet 1    • Cholecalciferol (VITAMIN D3) 2000 UNITS capsule Take  by mouth 2 (two) times a day.      • Coenzyme Q10 100 MG tablet Take 2 tablets by mouth Daily.      • doxycycline (VIBRAMYCIN) 100 MG capsule Take 100 mg by mouth 2 (Two) Times a Day. TAKING FOR BRONCHITIS PER PT      • Dulaglutide (Trulicity) 1.5 MG/0.5ML solution pen-injector Inject 0.75 mg under the skin into the appropriate area as directed 1 (One) Time Per Week. 6 mL 0    • fenofibric acid (TRILIPIX) 135 MG capsule delayed-release delayed release capsule TAKE 1 CAPSULE BY MOUTH ONE TIME A DAY  (Patient taking differently: Take 135 mg by mouth Daily.) 30 capsule 2    • fluticasone (FLONASE) 50 MCG/ACT nasal spray 2 sprays into the nostril(s) as directed by provider Daily. 2 puffs each nostril (Patient taking differently: 2 sprays into the nostril(s) as directed by provider Daily As Needed for Rhinitis or Allergies. 2 puffs each nostril) 1 bottle 0    • Jardiance 10 MG tablet TAKE 1 TABLET BY MOUTH EVERY DAY  (Patient taking differently: Take 10 mg by mouth Daily.) 30 tablet 3    • l-methylfolate-algae-B6-B12 (METANX) 3-90.314-2-35 MG capsule capsule TAKE 1 CAPSULE BY MOUTH TWICE A DAY. (Patient taking differently: Take 1 capsule by mouth 2 (Two) Times a Day.) 180 capsule 1    • Lantus 100 UNIT/ML injection Up to 50 units daily (Patient taking differently: Inject 45 Units under the skin into the appropriate area as directed Every Night. Up to 50 units daily) 20 mL 5    • leuprolide (LUPRON) 22.5 MG injection  Inject 22.5 mg into the appropriate muscle as directed by prescriber Every 3 (Three) Months.      • losartan-hydrochlorothiazide (HYZAAR) 100-25 MG per tablet Take 1 tablet by mouth Daily.      • metFORMIN ER (GLUCOPHAGE-XR) 500 MG 24 hr tablet Take 1 tablet by mouth 2 (Two) Times a Day. (Patient taking differently: Take 500 mg by mouth Daily With Breakfast.) 60 tablet 5    • methocarbamol (ROBAXIN) 500 MG tablet Take 1 tablet by mouth 3 (Three) Times a Day As Needed for Muscle Spasms. 30 tablet 0    • metoprolol succinate XL (TOPROL-XL) 100 MG 24 hr tablet TAKE 1 TABLET BY MOUTH EVERY DAY  (Patient taking differently: Take 100 mg by mouth Daily.) 90 tablet 0    • ondansetron (ZOFRAN) 8 MG tablet Take 1 tablet by mouth 3 (Three) Times a Day As Needed for Nausea or Vomiting. 30 tablet 5    • pravastatin (PRAVACHOL) 40 MG tablet TAKE ONE TABLET BY MOUTH EVERY NIGHT AT BEDTIME (Patient taking differently: Take 40 mg by mouth Every Night.) 90 tablet 0      PMH:   Past Medical History:   Diagnosis Date   • Aortic stenosis     status post ROSS procedure, remote, with December 2015 echocardiography revealing normal aortic and pulmonary valve function, normal ejection fraction and mild to moderate MR   • Arthritis    • Bilateral impacted cerumen 11/23/2016   • Bronchitis    • Chronic gout of right foot 6/13/2016    Impression: 03/08/2016 - stable.;    • COVID-19 vaccine series completed 05/12/2021    [place] 2nd dose 3/12/21.   • Depression    • Diabetes mellitus (HCC)    • Diverticulitis    • Exogenous obesity    • Gout    • Heart murmur    • History of vitamin D deficiency    • Hypercholesteremia 8/11/2016   • Hyperlipidemia    • Hypertension    • Kidney lesion    • Malignant neoplasm of prostate (HCC)    • Morbid obesity (HCC) 8/11/2016   • Pneumonia 05/12/2021   • Primary osteoarthritis involving multiple joints 6/13/2016    Impression: 03/08/2016 - stable;    • Pulmonary embolism (HCC)    • Sleep apnea 05/12/2021     "C-Pap   • Uncontrolled type 2 diabetes mellitus with hyperglycemia (HCC) 6/13/2016    Impression: 03/08/2016 - seeing Endo .;    • Vertigo      PSH:    Past Surgical History:   Procedure Laterality Date   • CARDIAC VALVE REPLACEMENT  05/12/2021    Ross procedure 22 years ago   • COLON SURGERY     • COLONOSCOPY     • COLOSTOMY  1999   • COLOSTOMY REVISION     • EXPLORATORY LAPAROTOMY      With Tissue Removal   • LIPOMA EXCISION     • MOHS SURGERY     • OTHER SURGICAL HISTORY      Porcine Valve   • PROSTATE SURGERY     • PROSTATECTOMY  2000     History of Prostatectomy Perineal Radical   • SKIN CANCER EXCISION      from head and lip   • TONSILLECTOMY         Social History:  Social History     Tobacco Use   • Smoking status: Never Smoker   • Smokeless tobacco: Never Used   Substance Use Topics   • Alcohol use: No       Physical Exam:/94 (BP Location: Right arm, Patient Position: Lying)   Pulse 96   Temp 97.7 °F (36.5 °C) (Temporal)   Resp 18   Ht 167.6 cm (66\")   Wt 88.9 kg (196 lb)   SpO2 96%   BMI 31.64 kg/m²       General Appearance:    Alert, cooperative, no distress, appears stated age   Head:    Normocephalic, without obvious abnormality, atraumatic   Lungs:     Clear to auscultation bilaterally, respirations unlabored    Heart: Regular rate and rhythm, S1 and S2 normal, no murmur, rub    or gallop    Abdomen:    Soft without tenderness  +bowel sounds   Breast Exam:    deferred   Genitalia:    deferred   Extremities:   Extremities normal, atraumatic, no cyanosis or edema   Skin:   Skin color, texture, turgor normal, no rashes or lesions   Neurologic:   Grossly intact     Results Review:   LABS:  Lab Results   Component Value Date    WBC 15.80 (H) 12/06/2021    HGB 14.5 12/06/2021    HCT 43.6 12/06/2021    MCV 87.7 12/06/2021     12/06/2021    NEUTROABS 6.14 12/01/2021    GLUCOSE 164 (H) 12/06/2021    BUN 18 12/06/2021    CREATININE 0.92 12/06/2021    EGFRIFNONA 78 12/06/2021    EGFRIFAFRI 95 " 10/14/2020     12/06/2021    K 3.6 12/06/2021    CL 99 12/06/2021    CO2 28.0 12/06/2021    MG 1.6 09/01/2021    PHOS 4.5 09/01/2021    CALCIUM 10.1 12/06/2021    ALBUMIN 4.40 12/06/2021    AST 27 12/06/2021    ALT 25 12/06/2021    BILITOT 1.1 12/06/2021       RADIOLOGY:  Imaging Results (Last 72 Hours)     ** No results found for the last 72 hours. **           Cancer Staging (if applicable):  Cancer Patient: __ yes __no __unknown; If yes, clinical stage T:__ N:__M:__, stage group    Impression:  RIGHT RENAL MASS    Plan:  RIGHT NEPHROURETERECTOMY LAPAROSCOPIC WITH DAVINCI ROBOT; CYSTOSCOPY FLEXIBLE      DAISY Longoria 12/7/2021 08:02 EST

## 2021-12-07 NOTE — OP NOTE
NEPHROURETERECTOMY LAPAROSCOPIC WITH DAVINCI ROBOT  Procedure Note    Blane Dietz  12/7/2021    Pre-op Diagnosis:   Right renal mass    Post-op Diagnosis:     Right renal mass    Procedure/CPT® Codes:      Procedure(s):  RIGHT NEPHROURETERECTOMY LAPAROSCOPIC WITH DAVINCI ROBOT  Right robotic assisted laparoscopic adrenalectomy  CYSTOSCOPY FLEXIBLE    Surgeon(s):  José Miguel Villafuerte Jr., MD Slabaugh, Thomas Kirk Jr., MD    Anesthesia: General with Block    Staff:   Circulator: America Phipps RN; Mary Alas RN  Scrub Person: Marely Atkinson; Karley Burnett  Assistant: Marcus Sutton PA; Ashutosh Hernández PA-C    Assistant: Marcus Sutton PA; Ashutosh Hernández PA-C Was needed to assist in this case with retraction, exposure, instrument placement.    Estimated Blood Loss: <500ml  Urine Voided: * No values recorded between 12/7/2021  9:39 AM and 12/7/2021 11:32 AM *    Specimens:                Specimens     ID Source Type Tests Collected By Collected At Frozen?    A Kidney, Right Tissue · TISSUE PATHOLOGY EXAM   José Miguel Villafuerte Jr., MD 12/7/21 1053 No    Description: right adrenal gland and ureter    This specimen was not marked as sent.    B Ureter, Right Tissue · TISSUE PATHOLOGY EXAM   José Miguel Villafuerte Jr., MD 12/7/21 1120 No    Description: distal margin is opposite clip    This specimen was not marked as sent.            Drains:   Closed/Suction Drain 1 Right RLQ Bulb (Active)       Urethral Catheter Silicone 16 Fr. (Active)       Findings: Right renal mass with significant extra ureteral disease.  Adrenal mass appeared to be involved and adrenalectomy performed.  Cystoscopy normal    Complications: None    History: Is a pleasant 86-year-old gentleman with a history of prostate cancer status post prostatectomy with recurrent disease followed by oncology.  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  As such the patient was recommended for nephro ureterectomy and he wishes to proceed in this fashion understanding risk benefits and alternatives.  He gives his full consent for robotic assisted laparoscopic right nephro ureterectomy and cystoscopy to make sure there are no tumors within the urinary bladder.    Operative Report: After consent is obtained patient's brought to the operating suite was placed in supine position.  Anesthesia was induced.  Flexible cystoscopy was performed prior to placement of Bailey catheter and on pan cystoscopy there were no abnormal urothelial lesions or bladder stones.  Both ureter orifices were orthotopic in nature with clear reflux.  This point Bailey catheter was placed and the patient was positioned in the right side up modified flank position padding all pressure points.  After being sterilely prepped and draped in normal fashion Veress needle technique was used at the umbilicus to create pneumoperitoneum.  In the right upper quadrant we established a camera trocar site using an 8 mm blunt Visiport.  On inspection of the abdomen he had some midline adhesions however there were no adhesions in the right abdomen.  Remainder the robotic and assistant trochars were placed.  We able to reflect the colon and hepatic flexure of the colon medially.  There was significant renal mass seen medially and towards the vena cava.  We carefully performed a [place] maneuver and the duodenum was uninvolved with the renal mass.  We were able to identify the ureter and using this an anterior lateral retractor we were able to perform posterior dissection in this retraction helped us identify our renal hilum.  We identified a single renal vein and single renal artery which were somewhat obscured by the renal mass however we were able to see a place for transection.  Using an Endo JIMENEZ vascular load stapler we were able to take the renal artery and renal vein with 1 staple fire.  Hemostasis  was excellent.  As we completed our upper pole dissection it was clear that the adrenal gland was adherent to the mass and because of this we went ahead and performed adrenalectomy.  We controlled the adrenal vein using a vessel sealer device as well as the perforating arteries into the adrenal gland.  We completed her upper pole dissection and lateral dissection freeing up the specimen.  To get our specimen out of the way we went ahead and divided our ureter after clipping it proximally and distally.  This was placed into a specimen sac and placed in the upper abdomen for dissection of the distal ureter.  Distal ureter appeared to be benign completely and we dissected down into the pelvis.  From his previous colon surgery and prostate surgery there was significant adhesions in the distal pelvis and we elected to dissect down to the level of the UVJ but did not perform bladder cuff.  We placed a clip at the distal aspect of the ureter and divided the ureter sending this off for specimen.  A drain was brought in through the most lateral trocar site.  Please note that our distal ureteral excision and dissection was performed after repositioning the robot.  At this point the robot is undocked and under laparoscopic guidance we removed all of our trochars.  We enlarged the fascial defect at the assistant trocar site in the right lower quadrant and created an extraction site where the specimen was removed within the specimen sac.  We closed this fascial defect using a running looped PDS suture.  All incisions were irrigated.  Subcutaneous tissues brought together using 3-0 Vicryl.  Skin was brought together using Dermabond.  JANA drain Bailey catheter secured in place.  Anesthesia reversed.  Patient was taken to the recovery room in stable condition.    José Miguel Villafuerte Jr., MD     Date: 12/7/2021  Time: 11:40 EST

## 2021-12-07 NOTE — ANESTHESIA POSTPROCEDURE EVALUATION
Patient: Blane Dietz    Procedure Summary     Date: 12/07/21 [facility] / [facility]:  [facility]    Anesthesia Start: 0939 Anesthesia Stop: 1156    Procedures:       RIGHT NEPHROURETERECTOMY LAPAROSCOPIC WITH DAVINCI ROBOT (Right Abdomen)      CYSTOSCOPY FLEXIBLE (N/A ) Diagnosis:     Surgeons: José Miguel Villafuerte Jr., MD Provider: Lloyd Nicolas Jr., MD    Anesthesia Type: general with block ASA Status: 3          Anesthesia Type: general with block    Vitals  No vitals data found for the desired time range.          Post Anesthesia Care and Evaluation    Patient location during evaluation: PACU  Patient participation: complete - patient participated  Level of consciousness: awake and alert  Pain management: adequate  Airway patency: patent  Anesthetic complications: No anesthetic complications  PONV Status: none  Cardiovascular status: hemodynamically stable and acceptable  Respiratory status: nonlabored ventilation, acceptable and nasal cannula  Hydration status: acceptable

## 2021-12-07 NOTE — ANESTHESIA PREPROCEDURE EVALUATION
Anesthesia Evaluation     Patient summary reviewed and Nursing notes reviewed   no history of anesthetic complications:  NPO Solid Status: > 8 hours  NPO Liquid Status: > 2 hours           Airway   Mallampati: I  TM distance: >3 FB  Neck ROM: full  Dental - normal exam     Pulmonary - normal exam    breath sounds clear to auscultation  (+) pulmonary embolism (Oon eliquis. stopped 3days ago), sleep apnea,   (-) not a smoker  Cardiovascular - normal exam    ECG reviewed  PT is on anticoagulation therapy    (+) hypertension, valvular problems/murmurs (s/p AVR), CHF Systolic <55%, hyperlipidemia,     ROS comment: TTE from 3/2021:  · Left ventricular wall thickness is consistent with mild concentric hypertrophy.  · Left ventricular ejection fraction appears to be 46 - 50%. Left ventricular systolic function is low normal.  · Left ventricular diastolic dysfunction is noted.  · There is a bioprosthetic aortic valve present. Mild transvalvular regurgitation is present in the prosthetic aortic valve.  · Estimated right ventricular systolic pressure from tricuspid regurgitation is mildly elevated (35-45 mmHg).  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        Neuro/Psych  (+) psychiatric history,     GI/Hepatic/Renal/Endo    (+) obesity,   liver disease history of elevated LFT, diabetes mellitus type 2,     Musculoskeletal     Abdominal    Substance History      OB/GYN          Other   arthritis,    history of cancer (prostate cancer status post  radical prostatectomy in 2000)                  Anesthesia Plan    ASA 3     general with block     intravenous induction     Anesthetic plan, all risks, benefits, and alternatives have been provided, discussed and informed consent has been obtained with: patient.    Plan discussed with CRNA.

## 2021-12-07 NOTE — H&P
Admission HP     Patient Name: Blane Dietz  MRN: [id number]  : 1935  DOS: 2021    Attending: José Miguel Villafuerte Jr*    Primary Care Provider: José Miguel Villafuerte Jr., MD      Patient Care Team:  José Miguel Villafuerte Jr., MD as PCP - General (Urology)  Ha Toussaint MD as Consulting Physician (Cardiology)  Guero Grande MD as Consulting Physician (Cardiology)  Shannan Ramon MD as Consulting Physician (Hematology and Oncology)  Xavier Boston MD as Consulting Physician (Endocrinology)    Chief complaint:  Renal mass.    Subjective   Patient is a pleasant 86 y.o. male presented for scheduled surgery by Dr. Noy Whitney.    Per his note (Is a pleasant 86-year-old gentleman with a history of prostate cancer status post prostatectomy with recurrent disease followed by oncology.  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  As such the patient was recommended for nephro ureterectomy and he wishes to proceed in this fashion understanding risk benefits and alternatives.  He gives his full consent for robotic assisted laparoscopic right nephro ureterectomy and cystoscopy to make sure there are no tumors within the urinary bladder.).    Patient underwent right nephro ureterectomy, laparoscopic with da Roxie robot, right robotic assisted laparoscopic adrenalectomy, and flexible cystoscopy.  Surgery was done under general anesthesia and a block.  Was tolerated well.    Seen in PACU, drowsy but appropriate.  Denies nausea or vomiting or shortness of breath.  Has expected postoperative pain.     Allergies   Allergen Reactions   • Ampicillin Anaphylaxis   • Medrol [Methylprednisolone] Unknown - High Severity     Made his blood sugar get really high, can't take this   • Myrbetriq [Mirabegron] Unknown - High Severity     High BP   • Penicillins Anaphylaxis   • Bee Venom  Swelling        Medications Prior to Admission   Medication Sig Dispense Refill Last Dose   • triamterene-hydrochlorothiazide (MAXZIDE) 75-50 MG per tablet Take 1 tablet by mouth Daily.   1 month ago   • allopurinol (ZYLOPRIM) 300 MG tablet TAKE 1 TABLET BY MOUTH ONE TIME A DAY  (Patient taking differently: Take 300 mg by mouth Daily.) 90 tablet 0 3 weeks ago   • amLODIPine (NORVASC) 5 MG tablet Take 1 tablet by mouth Daily. 30 tablet 1 3 weeks ago   • Apalutamide 60 MG tablet Take 4 tablets by mouth once daily with or without food. Do not crush or chew tablets. (Patient taking differently: Take 240 mg by mouth Daily. NOT SURE IF HE IS TAKING) 120 tablet 3 Unknown at Unknown time   • apixaban (ELIQUIS) 5 MG tablet tablet Take 2 tablets by mouth every 12 hours x5 days then decrease to 1 tablet by mouth every 12 hours starting on 4/6/21. (Patient taking differently: Take 5 mg by mouth Every 12 (Twelve) Hours.) 70 tablet 1 11/28/2021   • Cholecalciferol (VITAMIN D3) 2000 UNITS capsule Take  by mouth 2 (two) times a day.   More than a month at Unknown time   • Cinnamon 500 MG tablet Take 2,000 tablets by mouth Daily.   More than a month at Unknown time   • Coenzyme Q10 100 MG tablet Take 2 tablets by mouth Daily.   More than a month at Unknown time   • doxycycline (VIBRAMYCIN) 100 MG capsule Take 100 mg by mouth 2 (Two) Times a Day. TAKING FOR BRONCHITIS PER PT   12/3/2021   • Dulaglutide (Trulicity) 1.5 MG/0.5ML solution pen-injector Inject 0.75 mg under the skin into the appropriate area as directed 1 (One) Time Per Week. 6 mL 0 1 week ago   • fenofibric acid (TRILIPIX) 135 MG capsule delayed-release delayed release capsule TAKE 1 CAPSULE BY MOUTH ONE TIME A DAY  (Patient taking differently: Take 135 mg by mouth Daily.) 30 capsule 2 More than a month at Unknown time   • fluticasone (FLONASE) 50 MCG/ACT nasal spray 2 sprays into the nostril(s) as directed by provider Daily. 2 puffs each nostril (Patient taking  differently: 2 sprays into the nostril(s) as directed by provider Daily As Needed for Rhinitis or Allergies. 2 puffs each nostril) 1 bottle 0 More than a month at Unknown time   • Jardiance 10 MG tablet TAKE 1 TABLET BY MOUTH EVERY DAY  (Patient taking differently: Take 10 mg by mouth Daily.) 30 tablet 3 More than a month at Unknown time   • l-methylfolate-algae-B6-B12 (METANX) 3-90.314-2-35 MG capsule capsule TAKE 1 CAPSULE BY MOUTH TWICE A DAY. (Patient taking differently: Take 1 capsule by mouth 2 (Two) Times a Day.) 180 capsule 1 More than a month at Unknown time   • Lantus 100 UNIT/ML injection Up to 50 units daily (Patient taking differently: Inject 45 Units under the skin into the appropriate area as directed Every Night. Up to 50 units daily) 20 mL 5 12/5/2021   • LECITHIN PO Take 1,200 mg by mouth Daily.   More than a month at Unknown time   • leuprolide (LUPRON) 22.5 MG injection Inject 22.5 mg into the appropriate muscle as directed by prescriber Every 3 (Three) Months.   12/1/2021   • losartan-hydrochlorothiazide (HYZAAR) 100-25 MG per tablet Take 1 tablet by mouth Daily.   12/5/2021   • metFORMIN ER (GLUCOPHAGE-XR) 500 MG 24 hr tablet Take 1 tablet by mouth 2 (Two) Times a Day. (Patient taking differently: Take 500 mg by mouth Daily With Breakfast.) 60 tablet 5 1 week ago   • methocarbamol (ROBAXIN) 500 MG tablet Take 1 tablet by mouth 3 (Three) Times a Day As Needed for Muscle Spasms. 30 tablet 0 More than a month at Unknown time   • metoprolol succinate XL (TOPROL-XL) 100 MG 24 hr tablet TAKE 1 TABLET BY MOUTH EVERY DAY  (Patient taking differently: Take 100 mg by mouth Daily.) 90 tablet 0 More than a month at Unknown time   • ondansetron (ZOFRAN) 8 MG tablet Take 1 tablet by mouth 3 (Three) Times a Day As Needed for Nausea or Vomiting. 30 tablet 5 has not used   • pravastatin (PRAVACHOL) 40 MG tablet TAKE ONE TABLET BY MOUTH EVERY NIGHT AT BEDTIME (Patient taking differently: Take 40 mg by mouth  Every Night.) 90 tablet 0 1 month ago        Past Medical History:   Diagnosis Date   • Aortic stenosis     status post ROSS procedure, remote, with December 2015 echocardiography revealing normal aortic and pulmonary valve function, normal ejection fraction and mild to moderate MR   • Arthritis    • Bilateral impacted cerumen 11/23/2016   • Bronchitis    • Chronic gout of right foot 6/13/2016    Impression: 03/08/2016 - stable.;    • COVID-19 vaccine series completed 05/12/2021    Maderna 2nd dose 3/12/21.   • Depression    • Diabetes mellitus (HCC)    • Diverticulitis    • Exogenous obesity    • Gout    • Heart murmur    • History of vitamin D deficiency    • Hypercholesteremia 8/11/2016   • Hyperlipidemia    • Hypertension    • Kidney lesion    • Malignant neoplasm of prostate (HCC)    • Morbid obesity (HCC) 8/11/2016   • Pneumonia 05/12/2021   • Primary osteoarthritis involving multiple joints 6/13/2016    Impression: 03/08/2016 - stable;    • Pulmonary embolism (HCC)    • Sleep apnea 05/12/2021    C-Pap   • Uncontrolled type 2 diabetes mellitus with hyperglycemia (HCC) 6/13/2016    Impression: 03/08/2016 - seeing Endo .;    • Vertigo      Past Surgical History:   Procedure Laterality Date   • CARDIAC VALVE REPLACEMENT  05/12/2021    Ross procedure 22 years ago   • COLON SURGERY     • COLONOSCOPY     • COLOSTOMY  1999   • COLOSTOMY REVISION     • EXPLORATORY LAPAROTOMY      With Tissue Removal   • LIPOMA EXCISION     • MOHS SURGERY     • OTHER SURGICAL HISTORY      Porcine Valve   • PROSTATE SURGERY     • PROSTATECTOMY  2000     History of Prostatectomy Perineal Radical   • SKIN CANCER EXCISION      from head and lip   • TONSILLECTOMY       Family History   Problem Relation Age of Onset   • Diabetes Mother    • Lung cancer Mother    • Cancer Mother    • Heart disease Father    • Breast cancer Other    • Cancer Other    • Hypertension Other    • Migraines Other    • Obesity Other    • Diabetes Other      Social  "History     Tobacco Use   • Smoking status: Never Smoker   • Smokeless tobacco: Never Used   Vaping Use   • Vaping Use: Never used   Substance Use Topics   • Alcohol use: No   • Drug use: No   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  Also tells me he is an     Review of Systems  Pertinent items are noted in HPI, all other systems reviewed and negative    Vital Signs  /91   Pulse 62   Temp 97.8 °F (36.6 °C)   Resp 18   Ht 167.6 cm (66\")   Wt 88.9 kg (196 lb)   SpO2 97%   BMI 31.64 kg/m²     Physical Exam:    General Appearance:    Alert, cooperative, in no acute distress   Head:    Normocephalic, without obvious abnormality, atraumatic   Eyes:            Lids and lashes normal, conjunctivae and sclerae normal, no   icterus, no pallor, corneas clear    Ears:    Ears appear intact with no abnormalities noted   Throat:   No oral lesions, no thrush, oral mucosa moist   Neck:   No adenopathy, supple, trachea midline, no thyromegaly         Lungs:     Clear to auscultation,respirations regular, even and                   unlabored    Heart:    Regular rhythm and normal rate, normal S1 and S2, no            murmur, no gallop   Abdomen:      Soft, mild distention, dressing over incision clean dry and intact.   Genitalia:    Deferred  Bailey: With pink urine.   Extremities:   Moves all extremities well, no edema, no cyanosis, no              redness   Pulses:   Pulses palpable and equal bilaterally   Skin:   No bleeding, bruising or rash   Neurologic:   Cranial nerves 2 - 12 grossly intact, no gross motor deficit.     Results from last 7 days   Lab Units 12/07/21  1224 12/06/21  0908 12/01/21  1211   WBC 10*3/mm3 11.58* 15.80* 10.41   HEMOGLOBIN g/dL 13.0 14.5 14.4   HEMATOCRIT % 41.3 43.6 44.8   PLATELETS 10*3/mm3 155 167 170   MONOCYTES % %  --   --  7.0     Results from last 7 days   Lab Units 12/06/21  0908 12/01/21  1211   SODIUM mmol/L 141 138   POTASSIUM mmol/L 3.6 3.5 "   CHLORIDE mmol/L 99 98   CO2 mmol/L 28.0 28.7   BUN mg/dL 18 19   CREATININE mg/dL 0.92 0.92   CALCIUM mg/dL 10.1 10.5   BILIRUBIN mg/dL 1.1 0.8   ALK PHOS U/L 75 61   ALT (SGPT) U/L 25 25   AST (SGOT) U/L 27 28   GLUCOSE mg/dL 164* 187*     Lab Results   Component Value Date    HGBA1C 9.50 (H) 12/06/2021       Assessment and Plan:    s/p RIGHT NEPHROURETERECTOMY LAPAROSCOPIC WITH DAVINCI ROBOT  Right robotic assisted laparoscopic adrenalectomy  CYSTOSCOPY FLEXIBLE    Right kidney mass    S/P AVR    HLD (hyperlipidemia)    T2DM (type 2 diabetes mellitus), uncontrolled     HTN    Plan    Admitted for further management.  1. Ambulation, encouraged   2. Pain control-prns   3. IS-encourage  4. DVT proph- Mechanicals and Lovenox SQ. Eliquis to resume at a later date when okay with urology  5. Bowel regimen  6. Resume home medications as appropriate.  7. Monitor post-op labs and path.  8. Clear liquid diet, advance as tolerated with return of bowel function  9.Monitor output from JANA drain, aim to remove prior to hospital discharge.  10.Discharge planning.    -Dyslipidemia:  Resume home regimen statin ( formulary substitution when appropriate).    - Hypertension:  Resume home medications as appropriate, formulary substitution when indicated.  Holding parameters.  Prn medications for elevated blood pressure.    -DM type 2, uncontrolled  Hgb A1C 9.5  Hold OHA as appropriate  FSBG AC/HS, ( q 6 when NPO), along with correction humalog.  Long acting insulin, mealtime insulin, dose adjustment as indicated.        Dragon disclaimer:  Part of this encounter note is an electronic transcription/translation of spoken language to printed text. The electronic translation of spoken language may permit erroneous, or at times, nonsensical words or phrases to be inadvertently transcribed; Although I have reviewed the note for such errors, some may still exist.      Milo Urias MD  12/07/21  12:40 EST

## 2021-12-07 NOTE — ANESTHESIA PROCEDURE NOTES
Airway  Urgency: elective    Date/Time: 12/7/2021 9:46 AM  Airway not difficult    General Information and Staff    Patient location during procedure: OR  CRNA: Dominic Patel III, CRNA    Indications and Patient Condition  Indications for airway management: airway protection    Preoxygenated: yes  MILS not maintained throughout  Mask difficulty assessment: 0 - not attempted    Final Airway Details  Final airway type: endotracheal airway      Successful airway: ETT  Cuffed: yes   Successful intubation technique: direct laryngoscopy  Blade: Chip  Blade size: 3  ETT size (mm): 7.5  Cormack-Lehane Classification: grade I - full view of glottis  Placement verified by: chest auscultation and capnometry   Measured from: lips  ETT/EBT  to lips (cm): 21  Number of attempts at approach: 1  Assessment: lips, teeth, and gum same as pre-op and atraumatic intubation    Additional Comments  Negative epigastric sounds, Breath sound equal bilaterally with symmetric chest rise and fall.

## 2021-12-07 NOTE — PLAN OF CARE
Goal Outcome Evaluation:              Outcome Summary: alert x4, 2 L NC, luis carlos drain, marcano in place, tolerating diet, sleeping at the moment, no c.o at this time, will cont to monitor

## 2021-12-08 PROBLEM — G89.18 ACUTE POSTOPERATIVE PAIN: Status: ACTIVE | Noted: 2021-12-08

## 2021-12-08 PROBLEM — N28.9 RENAL INSUFFICIENCY: Status: ACTIVE | Noted: 2021-12-08

## 2021-12-08 PROBLEM — E89.6 H/O PARTIAL ADRENALECTOMY: Status: ACTIVE | Noted: 2021-12-08

## 2021-12-08 LAB
ANION GAP SERPL CALCULATED.3IONS-SCNC: 9 MMOL/L (ref 5–15)
BUN SERPL-MCNC: 22 MG/DL (ref 8–23)
BUN/CREAT SERPL: 16.2 (ref 7–25)
CALCIUM SPEC-SCNC: 8.2 MG/DL (ref 8.6–10.5)
CHLORIDE SERPL-SCNC: 107 MMOL/L (ref 98–107)
CO2 SERPL-SCNC: 27 MMOL/L (ref 22–29)
CREAT SERPL-MCNC: 1.36 MG/DL (ref 0.76–1.27)
DEPRECATED RDW RBC AUTO: 46.9 FL (ref 37–54)
ERYTHROCYTE [DISTWIDTH] IN BLOOD BY AUTOMATED COUNT: 13.8 % (ref 12.3–15.4)
GFR SERPL CREATININE-BSD FRML MDRD: 50 ML/MIN/1.73
GLUCOSE BLDC GLUCOMTR-MCNC: 118 MG/DL (ref 70–130)
GLUCOSE BLDC GLUCOMTR-MCNC: 138 MG/DL (ref 70–130)
GLUCOSE BLDC GLUCOMTR-MCNC: 161 MG/DL (ref 70–130)
GLUCOSE BLDC GLUCOMTR-MCNC: 86 MG/DL (ref 70–130)
GLUCOSE SERPL-MCNC: 140 MG/DL (ref 65–99)
HCT VFR BLD AUTO: 38.6 % (ref 37.5–51)
HGB BLD-MCNC: 12.4 G/DL (ref 13–17.7)
MCH RBC QN AUTO: 29.5 PG (ref 26.6–33)
MCHC RBC AUTO-ENTMCNC: 32.1 G/DL (ref 31.5–35.7)
MCV RBC AUTO: 91.9 FL (ref 79–97)
PLATELET # BLD AUTO: 161 10*3/MM3 (ref 140–450)
PMV BLD AUTO: 10.6 FL (ref 6–12)
POTASSIUM SERPL-SCNC: 4.3 MMOL/L (ref 3.5–5.2)
RBC # BLD AUTO: 4.2 10*6/MM3 (ref 4.14–5.8)
SODIUM SERPL-SCNC: 143 MMOL/L (ref 136–145)
WBC NRBC COR # BLD: 16.57 10*3/MM3 (ref 3.4–10.8)

## 2021-12-08 PROCEDURE — 80048 BASIC METABOLIC PNL TOTAL CA: CPT | Performed by: UROLOGY

## 2021-12-08 PROCEDURE — 85027 COMPLETE CBC AUTOMATED: CPT | Performed by: UROLOGY

## 2021-12-08 PROCEDURE — 25010000002 ENOXAPARIN PER 10 MG: Performed by: UROLOGY

## 2021-12-08 PROCEDURE — 25010000002 SODIUM CHLORIDE 0.9 % WITH KCL 20 MEQ 20-0.9 MEQ/L-% SOLUTION: Performed by: UROLOGY

## 2021-12-08 PROCEDURE — 94799 UNLISTED PULMONARY SVC/PX: CPT

## 2021-12-08 PROCEDURE — 63710000001 INSULIN DETEMIR PER 5 UNITS: Performed by: INTERNAL MEDICINE

## 2021-12-08 PROCEDURE — 63710000001 INSULIN LISPRO (HUMAN) PER 5 UNITS: Performed by: INTERNAL MEDICINE

## 2021-12-08 PROCEDURE — 82962 GLUCOSE BLOOD TEST: CPT

## 2021-12-08 PROCEDURE — 94660 CPAP INITIATION&MGMT: CPT

## 2021-12-08 RX ORDER — SODIUM CHLORIDE 9 MG/ML
100 INJECTION, SOLUTION INTRAVENOUS CONTINUOUS
Status: DISCONTINUED | OUTPATIENT
Start: 2021-12-08 | End: 2021-12-13

## 2021-12-08 RX ADMIN — GABAPENTIN 100 MG: 100 CAPSULE ORAL at 22:10

## 2021-12-08 RX ADMIN — ACETAMINOPHEN 650 MG: 325 TABLET, FILM COATED ORAL at 02:19

## 2021-12-08 RX ADMIN — SODIUM CHLORIDE 100 ML/HR: 9 INJECTION, SOLUTION INTRAVENOUS at 12:38

## 2021-12-08 RX ADMIN — ALLOPURINOL 300 MG: 300 TABLET ORAL at 08:25

## 2021-12-08 RX ADMIN — POTASSIUM CHLORIDE AND SODIUM CHLORIDE 100 ML/HR: 900; 150 INJECTION, SOLUTION INTRAVENOUS at 02:19

## 2021-12-08 RX ADMIN — INSULIN LISPRO 5 UNITS: 100 INJECTION, SOLUTION INTRAVENOUS; SUBCUTANEOUS at 08:26

## 2021-12-08 RX ADMIN — AMLODIPINE BESYLATE 5 MG: 5 TABLET ORAL at 08:24

## 2021-12-08 RX ADMIN — AZTREONAM 2 G: 2 INJECTION, POWDER, LYOPHILIZED, FOR SOLUTION INTRAMUSCULAR; INTRAVENOUS at 02:19

## 2021-12-08 RX ADMIN — GABAPENTIN 100 MG: 100 CAPSULE ORAL at 08:25

## 2021-12-08 RX ADMIN — GUAIFENESIN 200 MG: 200 SOLUTION ORAL at 22:10

## 2021-12-08 RX ADMIN — AZTREONAM 2 G: 2 INJECTION, POWDER, LYOPHILIZED, FOR SOLUTION INTRAMUSCULAR; INTRAVENOUS at 10:13

## 2021-12-08 RX ADMIN — PANTOPRAZOLE SODIUM 40 MG: 40 TABLET, DELAYED RELEASE ORAL at 06:25

## 2021-12-08 RX ADMIN — TRIAMTERENE AND HYDROCHLOROTHIAZIDE 1 TABLET: 37.5; 25 TABLET ORAL at 08:25

## 2021-12-08 RX ADMIN — AZTREONAM 2 G: 2 INJECTION, POWDER, LYOPHILIZED, FOR SOLUTION INTRAMUSCULAR; INTRAVENOUS at 17:22

## 2021-12-08 RX ADMIN — CLINDAMYCIN PHOSPHATE 300 MG: 300 INJECTION, SOLUTION INTRAVENOUS at 06:25

## 2021-12-08 RX ADMIN — INSULIN DETEMIR 15 UNITS: 100 INJECTION, SOLUTION SUBCUTANEOUS at 08:22

## 2021-12-08 RX ADMIN — METOPROLOL SUCCINATE 100 MG: 100 TABLET, EXTENDED RELEASE ORAL at 08:25

## 2021-12-08 RX ADMIN — INSULIN LISPRO 2 UNITS: 100 INJECTION, SOLUTION INTRAVENOUS; SUBCUTANEOUS at 08:24

## 2021-12-08 RX ADMIN — GABAPENTIN 100 MG: 100 CAPSULE ORAL at 17:22

## 2021-12-08 RX ADMIN — ACETAMINOPHEN 650 MG: 325 TABLET, FILM COATED ORAL at 22:10

## 2021-12-08 RX ADMIN — ACETAMINOPHEN 650 MG: 325 TABLET, FILM COATED ORAL at 13:34

## 2021-12-08 RX ADMIN — ACETAMINOPHEN 650 MG: 325 TABLET, FILM COATED ORAL at 08:25

## 2021-12-08 RX ADMIN — ENOXAPARIN SODIUM 40 MG: 40 INJECTION SUBCUTANEOUS at 08:25

## 2021-12-08 NOTE — PROGRESS NOTES
"IM progress note      Blane Dietz  [id number]  1935     LOS: 1 day     Attending: José Miguel Villafuerte Jr*    Primary Care Provider: José Miguel Villafuerte Jr., MD      Chief Complaint/Reason for visit:  Abd pain    Subjective   Doing well. Adequate pain control. Tolerating liquids. No flatus or BM. Ambulating with assist. Denies f/c/n/v/sob/cp.    Objective     Vital Signs    Visit Vitals  /60 (BP Location: Left arm, Patient Position: Sitting)   Pulse 79   Temp 98.5 °F (36.9 °C) (Oral)   Resp 18   Ht 167.6 cm (66\")   Wt 88.9 kg (196 lb)   SpO2 90%   BMI 31.64 kg/m²     Temp (24hrs), Av.1 °F (36.7 °C), Min:97.8 °F (36.6 °C), Max:98.6 °F (37 °C)      Nutrition: ADAT    Respiratory: RA    Physical Exam:     General Appearance:    Alert, cooperative, in no acute distress   Head:    Normocephalic, without obvious abnormality, atraumatic    Lungs:     Normal effort, symmetric chest rise, no crepitus, clear to      auscultation bilaterally             Heart:    Regular rhythm and normal rate, normal S1 and S2   Abdomen:     Soft, mild distention. Lap sites CDI. JANA present  : marcano- yellow UOP   Extremities:   No clubbing, cyanosis or edema.  No deformities.    Pulses:   Pulses palpable and equal bilaterally   Skin:   No bleeding, bruising or rash   Neurologic:   Moves all extremities with no obvious focal motor deficit.  Cranial nerves 2 - 12 grossly intact     Results Review:     I reviewed the patient's new clinical results.   Results from last 7 days   Lab Units 21  0819 21  1224 21  0908 21  1211 21  1211   WBC 10*3/mm3 16.57* 11.58* 15.80*   < > 10.41   HEMOGLOBIN g/dL 12.4* 13.0 14.5   < > 14.4   HEMATOCRIT % 38.6 41.3 43.6   < > 44.8   PLATELETS 10*3/mm3 161 155 167   < > 170   MONOCYTES % %  --   --   --   --  7.0    < > = values in this interval not displayed.     Results from last 7 days   Lab Units 21  0819 21  1224 21  0908 21  1211 " 12/01/21  1211   SODIUM mmol/L 143 141 141   < > 138   POTASSIUM mmol/L 4.3 3.8 3.6   < > 3.5   CHLORIDE mmol/L 107 102 99   < > 98   CO2 mmol/L 27.0 28.0 28.0   < > 28.7   BUN mg/dL 22 21 18   < > 19   CREATININE mg/dL 1.36* 1.20 0.92   < > 0.92   CALCIUM mg/dL 8.2* 8.6 10.1   < > 10.5   BILIRUBIN mg/dL  --   --  1.1  --  0.8   ALK PHOS U/L  --   --  75  --  61   ALT (SGPT) U/L  --   --  25  --  25   AST (SGOT) U/L  --   --  27  --  28   GLUCOSE mg/dL 140* 195* 164*   < > 187*    < > = values in this interval not displayed.     Results for RC BLANDON (MRN [id number]) as of 12/8/2021 11:52   Ref. Range 12/7/2021 20:24 12/8/2021 07:14 12/8/2021 08:19 12/8/2021 11:25   Glucose Latest Ref Range: 70 - 130 mg/dL 275 (H) 161 (H) 140 (H) 118     I reviewed the patient's new imaging including images and reports.    All medications reviewed.   !NOT ORDERED- apixaban (ELIQUIS), , Does not apply, Daily With Dinner  acetaminophen, 650 mg, Oral, Q6H   Or  acetaminophen, 650 mg, Oral, Q6H   Or  acetaminophen, 650 mg, Rectal, Q6H  allopurinol, 300 mg, Oral, Daily  amLODIPine, 5 mg, Oral, Daily  aztreonam, 2 g, Intravenous, Q8H  enoxaparin, 40 mg, Subcutaneous, Daily  fluticasone, 2 spray, Nasal, Daily  gabapentin, 100 mg, Oral, TID  insulin detemir, 15 Units, Subcutaneous, Daily  insulin lispro, 0-7 Units, Subcutaneous, TID AC  insulin lispro, 5 Units, Subcutaneous, TID With Meals  metoprolol succinate XL, 100 mg, Oral, Daily  pantoprazole, 40 mg, Oral, Q AM  triamterene-hydrochlorothiazide, 1 tablet, Oral, Daily        Assessment/Plan     s/p right nepheroureterectomy and adrenalectomy     Right kidney mass    S/P AVR    HLD (hyperlipidemia)    T2DM (type 2 diabetes mellitus) (HCC)    Leukocytosis    Acute postoperative pain    Renal insufficiency      Plan  1. Ambulation  2. Pain control-prns   3. IS-encouraged  4. DVT proph- mechs/Lovenox  5. Bowel regimen  6. ADAT. Continue IVF  7. Monitor post-op labs  8. DC planning for  home     -Dyslipidemia:  Resume home regimen statin ( formulary substitution when appropriate).     - Hypertension:  Resume home medications as appropriate, formulary substitution when indicated.  Holding parameters.  Prn medications for elevated blood pressure.     -DM type 2, uncontrolled  Hgb A1C 9.5  Hold OHA as appropriate  FSBG AC/HS, ( q 6 when NPO), along with correction humalog.  Long acting insulin, mealtime insulin, dose adjustment as indicated.    - RI  BMP in AM  Avoid nephrotoxic agents  Monitor UOP      HIPOLITO Mcgill  12/08/21  12:02 EST

## 2021-12-08 NOTE — PROGRESS NOTES
Patient is doing well this morning  Pain under good control  No flatus    Abdomen benign  Incisions look good  Bailey catheter in place    JANA drain output appropriate  Urine output sluggish      Keep Bailey catheter in today to watch urine output  Continue ambulation  Wait for bowel function  Monitor JANA drain  Hopefully home tomorrow

## 2021-12-09 LAB
ANION GAP SERPL CALCULATED.3IONS-SCNC: 8 MMOL/L (ref 5–15)
BUN SERPL-MCNC: 23 MG/DL (ref 8–23)
BUN/CREAT SERPL: 18.4 (ref 7–25)
CALCIUM SPEC-SCNC: 7.8 MG/DL (ref 8.6–10.5)
CHLORIDE SERPL-SCNC: 103 MMOL/L (ref 98–107)
CO2 SERPL-SCNC: 24 MMOL/L (ref 22–29)
CREAT FLD-MCNC: 1.2 MG/DL
CREAT SERPL-MCNC: 1.25 MG/DL (ref 0.76–1.27)
GFR SERPL CREATININE-BSD FRML MDRD: 55 ML/MIN/1.73
GLUCOSE BLDC GLUCOMTR-MCNC: 116 MG/DL (ref 70–130)
GLUCOSE BLDC GLUCOMTR-MCNC: 116 MG/DL (ref 70–130)
GLUCOSE BLDC GLUCOMTR-MCNC: 136 MG/DL (ref 70–130)
GLUCOSE BLDC GLUCOMTR-MCNC: 181 MG/DL (ref 70–130)
GLUCOSE SERPL-MCNC: 107 MG/DL (ref 65–99)
POTASSIUM SERPL-SCNC: 4.1 MMOL/L (ref 3.5–5.2)
SODIUM SERPL-SCNC: 135 MMOL/L (ref 136–145)

## 2021-12-09 PROCEDURE — 82962 GLUCOSE BLOOD TEST: CPT

## 2021-12-09 PROCEDURE — 80048 BASIC METABOLIC PNL TOTAL CA: CPT | Performed by: NURSE PRACTITIONER

## 2021-12-09 PROCEDURE — 63710000001 INSULIN DETEMIR PER 5 UNITS: Performed by: INTERNAL MEDICINE

## 2021-12-09 PROCEDURE — 94660 CPAP INITIATION&MGMT: CPT

## 2021-12-09 PROCEDURE — 25010000002 ENOXAPARIN PER 10 MG: Performed by: UROLOGY

## 2021-12-09 PROCEDURE — 94799 UNLISTED PULMONARY SVC/PX: CPT

## 2021-12-09 PROCEDURE — 82570 ASSAY OF URINE CREATININE: CPT | Performed by: INTERNAL MEDICINE

## 2021-12-09 PROCEDURE — 63710000001 INSULIN LISPRO (HUMAN) PER 5 UNITS: Performed by: INTERNAL MEDICINE

## 2021-12-09 RX ADMIN — INSULIN DETEMIR 15 UNITS: 100 INJECTION, SOLUTION SUBCUTANEOUS at 08:57

## 2021-12-09 RX ADMIN — PANTOPRAZOLE SODIUM 40 MG: 40 TABLET, DELAYED RELEASE ORAL at 06:50

## 2021-12-09 RX ADMIN — SODIUM CHLORIDE 100 ML/HR: 9 INJECTION, SOLUTION INTRAVENOUS at 02:55

## 2021-12-09 RX ADMIN — INSULIN LISPRO 2 UNITS: 100 INJECTION, SOLUTION INTRAVENOUS; SUBCUTANEOUS at 08:49

## 2021-12-09 RX ADMIN — ACETAMINOPHEN 650 MG: 325 TABLET, FILM COATED ORAL at 10:48

## 2021-12-09 RX ADMIN — ALLOPURINOL 300 MG: 300 TABLET ORAL at 08:51

## 2021-12-09 RX ADMIN — ACETAMINOPHEN 650 MG: 325 TABLET, FILM COATED ORAL at 04:40

## 2021-12-09 RX ADMIN — AZTREONAM 2 G: 2 INJECTION, POWDER, LYOPHILIZED, FOR SOLUTION INTRAMUSCULAR; INTRAVENOUS at 03:07

## 2021-12-09 RX ADMIN — INSULIN LISPRO 5 UNITS: 100 INJECTION, SOLUTION INTRAVENOUS; SUBCUTANEOUS at 08:50

## 2021-12-09 RX ADMIN — AZTREONAM 2 G: 2 INJECTION, POWDER, LYOPHILIZED, FOR SOLUTION INTRAMUSCULAR; INTRAVENOUS at 17:17

## 2021-12-09 RX ADMIN — AZTREONAM 2 G: 2 INJECTION, POWDER, LYOPHILIZED, FOR SOLUTION INTRAMUSCULAR; INTRAVENOUS at 10:48

## 2021-12-09 RX ADMIN — INSULIN LISPRO 5 UNITS: 100 INJECTION, SOLUTION INTRAVENOUS; SUBCUTANEOUS at 17:17

## 2021-12-09 RX ADMIN — AMLODIPINE BESYLATE 5 MG: 5 TABLET ORAL at 08:52

## 2021-12-09 RX ADMIN — GABAPENTIN 100 MG: 100 CAPSULE ORAL at 08:52

## 2021-12-09 RX ADMIN — ENOXAPARIN SODIUM 40 MG: 40 INJECTION SUBCUTANEOUS at 08:51

## 2021-12-09 RX ADMIN — SODIUM CHLORIDE 100 ML/HR: 9 INJECTION, SOLUTION INTRAVENOUS at 16:06

## 2021-12-09 RX ADMIN — METOPROLOL SUCCINATE 100 MG: 100 TABLET, EXTENDED RELEASE ORAL at 08:52

## 2021-12-09 RX ADMIN — TRIAMTERENE AND HYDROCHLOROTHIAZIDE 1 TABLET: 37.5; 25 TABLET ORAL at 08:51

## 2021-12-09 NOTE — CASE MANAGEMENT/SOCIAL WORK
Discharge Planning Assessment  [facility]     Patient Name: Blane Dietz  MRN: [id number]  Today's Date: 12/9/2021    Admit Date: 12/7/2021     Discharge Needs Assessment     Row Name 12/09/21 1221       Living Environment    Lives With alone    Current Living Arrangements home/apartment/condo    Primary Care Provided by self    Provides Primary Care For no one    Family Caregiver if Needed child(abi), adult    Able to Return to Prior Arrangements yes       Resource/Environmental Concerns    Resource/Environmental Concerns none       Transition Planning    Patient/Family Anticipates Transition to home with family    Patient/Family Anticipated Services at Transition     Transportation Anticipated family or friend will provide       Discharge Needs Assessment    Readmission Within the Last 30 Days no previous admission in last 30 days    Equipment Currently Used at Home other (see comments); walker, rolling  tri-cane    Concerns to be Addressed no discharge needs identified; denies needs/concerns at this time    Equipment Needed After Discharge none               Discharge Plan     Row Name 12/09/21 1224       Plan    Plan Home with daughter    Patient/Family in Agreement with Plan yes    Plan Comments Met & spoke with Mr Dietz at the bedside. He lives alone in [facility] At baseline, is ind with ADL's. Uses a tri-cane for mobility. Has a RW. Uses a CPAP HS through [employer] with O2 2LNC piped in. Has a concentrator/portable at home through [facility] in [place] ([phone]). Had HH many years ago, doesn't recall name of the agency. Denies rehab. His daughter is his POA. Has a living will. We discussed home with HH v/s rehab and he is agreeable to whatever therapy recommends. If home is the direction of AR, he will be staying with his daughter, so he won't be alone. His daughter will provide transport.    Final Discharge Disposition Code 01 - home or self-care              Continued Care and  Services - Admitted Since 12/7/2021    Coordination has not been started for this encounter.          Demographic Summary     Row Name 12/09/21 1220       General Information    Admission Type inpatient    General Information Comments Verified PCP is Dr Ha Marte. Primary insurance is [employer]. Has drug coverage.               Functional Status     Row Name 12/09/21 1221       Functional Status    Usual Activity Tolerance moderate    Current Activity Tolerance moderate       Functional Status, IADL    Medications independent    Meal Preparation independent    Housekeeping assistive equipment    Laundry assistive equipment    Shopping assistive equipment               Psychosocial    No documentation.                Abuse/Neglect    No documentation.                Legal    No documentation.                Substance Abuse    No documentation.                Patient Forms    No documentation.                   Leatha Uribe RN

## 2021-12-09 NOTE — PLAN OF CARE
Problem: Adult Inpatient Plan of Care  Goal: Plan of Care Review  Outcome: Ongoing, Progressing  Flowsheets (Taken 12/9/2021 1317)  Progress: improving  Plan of Care Reviewed With: patient  Outcome Summary: Pt is alert, not ambulating as well as yesterday per pt.  PT/OT orders placed.  JANA draining output is staying high.  2L NC awake and BiPap while sleeping. Per pt small BM and passing flatus.  Goal: Absence of Hospital-Acquired Illness or Injury  Outcome: Ongoing, Progressing  Intervention: Identify and Manage Fall Risk  Recent Flowsheet Documentation  Taken 12/9/2021 1253 by Karley Ledezma RN  Safety Promotion/Fall Prevention:   activity supervised   assistive device/personal items within reach   clutter free environment maintained   fall prevention program maintained   safety round/check completed  Taken 12/9/2021 1048 by Karley Ledezma RN  Safety Promotion/Fall Prevention:   activity supervised   assistive device/personal items within reach   clutter free environment maintained   fall prevention program maintained   safety round/check completed  Taken 12/9/2021 0834 by Karley Ledezma RN  Safety Promotion/Fall Prevention:   activity supervised   assistive device/personal items within reach   clutter free environment maintained   fall prevention program maintained   safety round/check completed  Intervention: Prevent Skin Injury  Recent Flowsheet Documentation  Taken 12/9/2021 0834 by Karley Ledezma RN  Body Position: position changed independently  Skin Protection:   adhesive use limited   skin-to-device areas padded  Intervention: Prevent and Manage VTE (venous thromboembolism) Risk  Recent Flowsheet Documentation  Taken 12/9/2021 0834 by Karley Ledezma RN  VTE Prevention/Management: sequential compression devices off  Goal: Optimal Comfort and Wellbeing  Outcome: Ongoing, Progressing  Intervention: Provide Person-Centered Care  Recent Flowsheet Documentation  Taken 12/9/2021 0834 by Karley Ledezma  VERO, RN  Trust Relationship/Rapport:   care explained   choices provided  Goal: Readiness for Transition of Care  Outcome: Ongoing, Progressing   Goal Outcome Evaluation:  Plan of Care Reviewed With: patient        Progress: improving  Outcome Summary: Pt is alert, not ambulating as well as yesterday per pt.  PT/OT orders placed.  JANA draining output is staying high.  2L NC awake and BiPap while sleeping. Per pt small BM and passing flatus.

## 2021-12-09 NOTE — PLAN OF CARE
Goal Outcome Evaluation:  Plan of Care Reviewed With: patient        Progress: improving   VSS, pt denies pain, F/C patent and draining, JANA continues to have a large amount of serosanguinous drainage and is leaking at the insertion site, DSG is CDI and Lap sites have no drainage, utilized CPAP during the night.

## 2021-12-10 LAB
ANION GAP SERPL CALCULATED.3IONS-SCNC: 8 MMOL/L (ref 5–15)
BUN SERPL-MCNC: 26 MG/DL (ref 8–23)
BUN/CREAT SERPL: 21.3 (ref 7–25)
CALCIUM SPEC-SCNC: 8.2 MG/DL (ref 8.6–10.5)
CHLORIDE SERPL-SCNC: 105 MMOL/L (ref 98–107)
CO2 SERPL-SCNC: 24 MMOL/L (ref 22–29)
CREAT SERPL-MCNC: 1.22 MG/DL (ref 0.76–1.27)
DEPRECATED RDW RBC AUTO: 45.8 FL (ref 37–54)
ERYTHROCYTE [DISTWIDTH] IN BLOOD BY AUTOMATED COUNT: 13.9 % (ref 12.3–15.4)
GFR SERPL CREATININE-BSD FRML MDRD: 57 ML/MIN/1.73
GLUCOSE BLDC GLUCOMTR-MCNC: 149 MG/DL (ref 70–130)
GLUCOSE BLDC GLUCOMTR-MCNC: 203 MG/DL (ref 70–130)
GLUCOSE BLDC GLUCOMTR-MCNC: 77 MG/DL (ref 70–130)
GLUCOSE BLDC GLUCOMTR-MCNC: 82 MG/DL (ref 70–130)
GLUCOSE SERPL-MCNC: 212 MG/DL (ref 65–99)
HCT VFR BLD AUTO: 39.5 % (ref 37.5–51)
HGB BLD-MCNC: 12.7 G/DL (ref 13–17.7)
MAGNESIUM SERPL-MCNC: 2.2 MG/DL (ref 1.6–2.4)
MCH RBC QN AUTO: 29.1 PG (ref 26.6–33)
MCHC RBC AUTO-ENTMCNC: 32.2 G/DL (ref 31.5–35.7)
MCV RBC AUTO: 90.4 FL (ref 79–97)
PLATELET # BLD AUTO: 171 10*3/MM3 (ref 140–450)
PMV BLD AUTO: 10.6 FL (ref 6–12)
POTASSIUM SERPL-SCNC: 4.7 MMOL/L (ref 3.5–5.2)
RBC # BLD AUTO: 4.37 10*6/MM3 (ref 4.14–5.8)
SODIUM SERPL-SCNC: 137 MMOL/L (ref 136–145)
WBC NRBC COR # BLD: 14.45 10*3/MM3 (ref 3.4–10.8)

## 2021-12-10 PROCEDURE — 97165 OT EVAL LOW COMPLEX 30 MIN: CPT

## 2021-12-10 PROCEDURE — 82962 GLUCOSE BLOOD TEST: CPT

## 2021-12-10 PROCEDURE — 63710000001 INSULIN LISPRO (HUMAN) PER 5 UNITS: Performed by: INTERNAL MEDICINE

## 2021-12-10 PROCEDURE — 97116 GAIT TRAINING THERAPY: CPT

## 2021-12-10 PROCEDURE — 25010000002 ENOXAPARIN PER 10 MG: Performed by: UROLOGY

## 2021-12-10 PROCEDURE — 97535 SELF CARE MNGMENT TRAINING: CPT

## 2021-12-10 PROCEDURE — 85027 COMPLETE CBC AUTOMATED: CPT | Performed by: INTERNAL MEDICINE

## 2021-12-10 PROCEDURE — 80048 BASIC METABOLIC PNL TOTAL CA: CPT | Performed by: INTERNAL MEDICINE

## 2021-12-10 PROCEDURE — 94660 CPAP INITIATION&MGMT: CPT

## 2021-12-10 PROCEDURE — 97161 PT EVAL LOW COMPLEX 20 MIN: CPT

## 2021-12-10 PROCEDURE — 63710000001 INSULIN DETEMIR PER 5 UNITS: Performed by: INTERNAL MEDICINE

## 2021-12-10 PROCEDURE — 83735 ASSAY OF MAGNESIUM: CPT | Performed by: INTERNAL MEDICINE

## 2021-12-10 PROCEDURE — 94799 UNLISTED PULMONARY SVC/PX: CPT

## 2021-12-10 RX ORDER — MAGNESIUM SULFATE HEPTAHYDRATE 40 MG/ML
2 INJECTION, SOLUTION INTRAVENOUS AS NEEDED
Status: DISCONTINUED | OUTPATIENT
Start: 2021-12-10 | End: 2021-12-13 | Stop reason: HOSPADM

## 2021-12-10 RX ORDER — MAGNESIUM SULFATE HEPTAHYDRATE 40 MG/ML
4 INJECTION, SOLUTION INTRAVENOUS AS NEEDED
Status: DISCONTINUED | OUTPATIENT
Start: 2021-12-10 | End: 2021-12-13 | Stop reason: HOSPADM

## 2021-12-10 RX ADMIN — TRIAMTERENE AND HYDROCHLOROTHIAZIDE 1 TABLET: 37.5; 25 TABLET ORAL at 09:23

## 2021-12-10 RX ADMIN — INSULIN LISPRO 5 UNITS: 100 INJECTION, SOLUTION INTRAVENOUS; SUBCUTANEOUS at 11:43

## 2021-12-10 RX ADMIN — AZTREONAM 2 G: 2 INJECTION, POWDER, LYOPHILIZED, FOR SOLUTION INTRAMUSCULAR; INTRAVENOUS at 09:22

## 2021-12-10 RX ADMIN — INSULIN DETEMIR 20 UNITS: 100 INJECTION, SOLUTION SUBCUTANEOUS at 09:31

## 2021-12-10 RX ADMIN — METOPROLOL SUCCINATE 100 MG: 100 TABLET, EXTENDED RELEASE ORAL at 09:23

## 2021-12-10 RX ADMIN — PANTOPRAZOLE SODIUM 40 MG: 40 TABLET, DELAYED RELEASE ORAL at 09:23

## 2021-12-10 RX ADMIN — ENOXAPARIN SODIUM 40 MG: 40 INJECTION SUBCUTANEOUS at 09:22

## 2021-12-10 RX ADMIN — AMLODIPINE BESYLATE 5 MG: 5 TABLET ORAL at 09:23

## 2021-12-10 RX ADMIN — AZTREONAM 2 G: 2 INJECTION, POWDER, LYOPHILIZED, FOR SOLUTION INTRAMUSCULAR; INTRAVENOUS at 02:59

## 2021-12-10 RX ADMIN — ALLOPURINOL 300 MG: 300 TABLET ORAL at 09:23

## 2021-12-10 RX ADMIN — INSULIN LISPRO 3 UNITS: 100 INJECTION, SOLUTION INTRAVENOUS; SUBCUTANEOUS at 11:42

## 2021-12-10 RX ADMIN — INSULIN LISPRO 5 UNITS: 100 INJECTION, SOLUTION INTRAVENOUS; SUBCUTANEOUS at 09:22

## 2021-12-10 NOTE — PLAN OF CARE
Goal Outcome Evaluation:  Plan of Care Reviewed With: patient           Outcome Summary: PT initial evaluation completed for pt s/p nephroureterectomy presenting with generalized weakness, SOA, and decreased functional mobility. Pt ambulated 400ft with RW and CGA. Required multiple standing rest breaks and one seated rest break due to increased SOA. Pt's decreased independence warrants PT skilled care. Recommend D/C to IP rehab for best functional outcomes.

## 2021-12-10 NOTE — PROGRESS NOTES
"IM progress note      Blane Dietz  [id number]  1935         Attending: José Miguel Villafuerte Jr*    Primary Care Provider: José Miguel Villafuerte Jr., MD      Chief Complaint/Reason for visit:  Abd pain    Subjective   Feels okay.  No nausea or vomiting or shortness of breath.     Objective        Visit Vitals  /68   Pulse 82   Temp 98.2 °F (36.8 °C) (Oral)   Resp 18   Ht 167.6 cm (66\")   Wt 88.9 kg (196 lb)   SpO2 90%   BMI 31.64 kg/m²     Temp (24hrs), Av.3 °F (36.8 °C), Min:98.1 °F (36.7 °C), Max:98.5 °F (36.9 °C)       Physical Exam:     General Appearance:    Alert, cooperative, in no acute distress   Head:    Normocephalic, without obvious abnormality, atraumatic    Lungs:     Normal effort, symmetric chest rise, no crepitus, clear to      auscultation bilaterally             Heart:    Regular rhythm and normal rate, normal S1 and S2   Abdomen:     Soft, mild distention. Lap sites CDI. JANA present  : marcano- yellow UOP   Extremities:   No clubbing, cyanosis or edema.  No deformities.    Pulses:   Pulses palpable and equal bilaterally   Skin:   No bleeding, bruising or rash   Neurologic:   Moves all extremities with no obvious focal motor deficit.  Cranial nerves 2 - 12 grossly intact     Results Review:     I reviewed the patient's new clinical results.   Results from last 7 days   Lab Units 21  0819 21  1224 21  0908   WBC 10*3/mm3 16.57* 11.58* 15.80*   HEMOGLOBIN g/dL 12.4* 13.0 14.5   HEMATOCRIT % 38.6 41.3 43.6   PLATELETS 10*3/mm3 161 155 167     Results from last 7 days   Lab Units 21  0418 21  0819 21  1224 21  0908 21  0908   SODIUM mmol/L 135* 143 141   < > 141   POTASSIUM mmol/L 4.1 4.3 3.8   < > 3.6   CHLORIDE mmol/L 103 107 102   < > 99   CO2 mmol/L 24.0 27.0 28.0   < > 28.0   BUN mg/dL 23 22 21   < > 18   CREATININE mg/dL 1.25 1.36* 1.20   < > 0.92   CALCIUM mg/dL 7.8* 8.2* 8.6   < > 10.1   BILIRUBIN mg/dL  --   --   --   --  1.1 "   ALK PHOS U/L  --   --   --   --  75   ALT (SGPT) U/L  --   --   --   --  25   AST (SGOT) U/L  --   --   --   --  27   GLUCOSE mg/dL 107* 140* 195*   < > 164*    < > = values in this interval not displayed.        I reviewed the patient's new imaging including images and reports.    All medications reviewed.   !NOT ORDERED- apixaban (ELIQUIS), , Does not apply, Daily With Dinner  allopurinol, 300 mg, Oral, Daily  amLODIPine, 5 mg, Oral, Daily  aztreonam, 2 g, Intravenous, Q8H  enoxaparin, 40 mg, Subcutaneous, Daily  fluticasone, 2 spray, Nasal, Daily  insulin detemir, 20 Units, Subcutaneous, Daily  insulin lispro, 0-7 Units, Subcutaneous, TID AC  insulin lispro, 5 Units, Subcutaneous, TID With Meals  metoprolol succinate XL, 100 mg, Oral, Daily  pantoprazole, 40 mg, Oral, Q AM  triamterene-hydrochlorothiazide, 1 tablet, Oral, Daily        Assessment/Plan     s/p right nepheroureterectomy and adrenalectomy     S/P AVR    HLD (hyperlipidemia)    T2DM (type 2 diabetes mellitus) (HCC)    Leukocytosis    Right kidney mass    Acute postoperative pain    Renal insufficiency      Plan  1. Ambulation/ encouraged.  2. Pain control-prns   3. IS-encouraged  4. DVT proph- mechs/Lovenox  5. Bowel regimen  6. ADAT.  Soft diet .  7. Monitor post-op labs  8. DC planning , pending improved mobility and stable PO intake.    -Dyslipidemia:  Resume home regimen statin ( formulary substitution when appropriate).     - Hypertension:  Resume home medications as appropriate, formulary substitution when indicated.  Holding parameters.  Prn medications for elevated blood pressure.     -DM type 2, uncontrolled  Hgb A1C 9.5  Hold OHA as appropriate  FSBG AC/HS, ( q 6 when NPO), along with correction humalog.  Long acting insulin, mealtime insulin, dose adjustment as indicated.    - RI  Monitor.  Avoid nephrotoxic agents  Monitor UOP      Milo Urias MD  12/10/21  10:22 EST

## 2021-12-10 NOTE — THERAPY EVALUATION
Patient Name: Blane Dietz  : 1935    MRN: [id number]                              Today's Date: 12/10/2021       Admit Date: 2021    Visit Dx:     ICD-10-CM ICD-9-CM   1. Right kidney mass  N28.89 593.9   2. Renal mass  N28.89 593.9     Patient Active Problem List   Diagnosis   • Essential hypertension   • Uncontrolled type 2 diabetes mellitus with hyperglycemia (HCC)   • Prostate cancer (HCC)   • Sleep apnea   • S/P AVR   • Pulmonary embolism (HCC)   • HLD (hyperlipidemia)   • T2DM (type 2 diabetes mellitus) (HCC)   • Leukocytosis   • Elevated LFTs   • Right kidney mass   • Acute respiratory failure with hypoxia (HCC)   • Pneumonia   • s/p right nepheroureterectomy and adrenalectomy    • Acute postoperative pain   • Renal insufficiency     Past Medical History:   Diagnosis Date   • Aortic stenosis     status post ROSS procedure, remote, with 2015 echocardiography revealing normal aortic and pulmonary valve function, normal ejection fraction and mild to moderate MR   • Arthritis    • Bilateral impacted cerumen 2016   • Bronchitis    • Chronic gout of right foot 2016    Impression: 2016 - stable.;    • COVID-19 vaccine series completed 2021    [place] 2nd dose 3/12/21.   • Depression    • Diabetes mellitus (HCC)    • Diverticulitis    • Exogenous obesity    • Gout    • Heart murmur    • History of vitamin D deficiency    • Hypercholesteremia 2016   • Hyperlipidemia    • Hypertension    • Kidney lesion    • Malignant neoplasm of prostate (HCC)    • Morbid obesity (HCC) 2016   • Pneumonia 2021   • Primary osteoarthritis involving multiple joints 2016    Impression: 2016 - stable;    • Pulmonary embolism (HCC)    • Sleep apnea 2021    C-Pap   • Uncontrolled type 2 diabetes mellitus with hyperglycemia (HCC) 2016    Impression: 2016 - seeing Endo .;    • Vertigo      Past Surgical History:   Procedure Laterality Date   • CARDIAC  VALVE REPLACEMENT  05/12/2021    Ross procedure 22 years ago   • COLON SURGERY     • COLONOSCOPY     • COLOSTOMY  1999   • COLOSTOMY REVISION     • CYSTOSCOPY N/A 12/7/2021    Procedure: CYSTOSCOPY FLEXIBLE;  Surgeon: José Miguel Villafuerte Jr., MD;  Location:  [facility];  Service: Urology;  Laterality: N/A;   • EXPLORATORY LAPAROTOMY      With Tissue Removal   • LIPOMA EXCISION     • MOHS SURGERY     • NEPHROURETERECTOMY Right 12/7/2021    Procedure: RIGHT NEPHROURETERECTOMY LAPAROSCOPIC WITH DAVINCI ROBOT;  Surgeon: José Miguel Villafuerte Jr., MD;  Location:  [facility];  Service: Robotics - DaVinci;  Laterality: Right;   • OTHER SURGICAL HISTORY      Porcine Valve   • PROSTATE SURGERY     • PROSTATECTOMY  2000     History of Prostatectomy Perineal Radical   • SKIN CANCER EXCISION      from head and lip   • TONSILLECTOMY        General Information     Row Name 12/10/21 1440          OT Time and Intention    Document Type evaluation  -CS     Mode of Treatment occupational therapy  -CS     Row Name 12/10/21 1440          General Information    Patient Profile Reviewed yes  -CS     Prior Level of Function independent:; all household mobility; ADL's; driving  Pt reports independence w/ mobility, ADLs, and driving prior to recent decline and surgery.  -CS     Existing Precautions/Restrictions oxygen therapy device and L/min; fall; other (see comments)  JANA drain, abd incision  -CS     Barriers to Rehab medically complex  -CS     Row Name 12/10/21 1440          Living Environment    Lives With alone  -CS     Row Name 12/10/21 1440          Home Main Entrance    Number of Stairs, Main Entrance none; other (see comments)  ramp to enter  -CS     Row Name 12/10/21 1440          Stairs Within Home, Primary    Stairs, Within Home, Primary One level, bath seating reported  -CS     Row Name 12/10/21 1440          Cognition    Orientation Status (Cognition) oriented x 4  -CS     Row Name 12/10/21 1440          Safety Issues,  Functional Mobility    Safety Issues Affecting Function (Mobility) awareness of need for assistance; insight into deficits/self-awareness; safety precaution awareness; safety precautions follow-through/compliance  -     Impairments Affecting Function (Mobility) balance; endurance/activity tolerance; pain; shortness of breath  -           User Key  (r) = Recorded By, (t) = Taken By, (c) = Cosigned By    Initials Name Provider Type     Matthew Sloan OT Occupational Therapist                 Mobility/ADL's     Row Name 12/10/21 [facility]          Bed Mobility    Bed Mobility sit-supine; scooting/bridging  -     Supine-Sit [place] (Bed Mobility) modified independence  -CS     Sit-Supine [place] (Bed Mobility) modified independence  -CS     Assistive Device (Bed Mobility) bed rails; head of bed elevated  -     Comment (Bed Mobility) slow and effortful but no assist required, appropriate sequencing intact  -     Row Name 12/10/21 1445          Transfers    Transfers sit-stand transfer  -     Comment (Transfers) cues for optimal hand placement w/ RW use  -     Sit-Stand [place] (Transfers) supervision  -     Row Name 12/10/21 [facility]          Sit-Stand Transfer    Assistive Device (Sit-Stand Transfers) walker, front-wheeled  -     Row Name 12/10/21 144          Functional Mobility    Functional Mobility- Comment one 4 minute rest break during functional hallway distance  -     Row Name 12/10/21 1445          Activities of Daily Living    BADL Assessment/Intervention upper body dressing; lower body dressing; grooming; bathing  -     Row Name 12/10/21 1445          Upper Body Dressing Assessment/Training    [place] Level (Upper Body Dressing) doff; don; pajama/robe; supervision  -     Comment (Upper Body Dressing) line mngt  -     Row Name 12/10/21 1445          Lower Body Dressing Assessment/Training    [place] Level (Lower Body Dressing) doff; don; socks; minimum assist  (75% patient effort); verbal cues; nonverbal cues (demo/gesture); set up; pants/bottoms  -CS     Assistive Devices (Lower Body Dressing) sock-aid; long-handled shoe horn; reacher  -CS     Position (Lower Body Dressing) edge of bed sitting  -CS     Comment (Lower Body Dressing) Issued AE to extend reach to distal BLEs, demonstrated understanding, would benefit from followup training  -CS     Row Name 12/10/21 1445          Grooming Assessment/Training    [place] Level (Grooming) hair care, combing/brushing; wash face, hands; set up  -CS     Row Name 12/10/21 1445          Bathing Assessment/Intervention    [place] Level (Bathing) bathing skills; lower body; other (see comments)  -CS     Assistive Devices (Bathing) long-handled sponge  -CS     Comment (Bathing) AE issued, demo'd understanding w/ simulated activity, discussed recommendation for seated bathing  -CS           User Key  (r) = Recorded By, (t) = Taken By, (c) = Cosigned By    Initials Name Provider Type    CS Matthew Sloan OT Occupational Therapist               Obj/Interventions     Row Name 12/10/21 1448          Sensory Assessment (Somatosensory)    Sensory Assessment (Somatosensory) UE sensation intact  -CS     Row Name 12/10/21 1448          Vision Assessment/Intervention    Visual Impairment/Limitations WFL; corrective lenses full-time; corrective lenses for reading  -CS     Row Name 12/10/21 1448          Range of Motion Comprehensive    General Range of Motion bilateral upper extremity ROM WFL  -CS     Row Name 12/10/21 1448          Strength Comprehensive (MMT)    General Manual Muscle Testing (MMT) Assessment upper extremity strength deficits identified  -CS     Comment, General Manual Muscle Testing (MMT) Assessment BUE grossly 4+/5  -CS     Row Name 12/10/21 1448          Motor Skills    Motor Skills coordination; functional endurance  -CS     Coordination bilateral; upper extremity; bimanual skills; finger to nose; WFL  -CS      Functional Endurance SOA during all standing activities on RA, required rest breaks during mobility  -CS     Row Name 12/10/21 1446          Balance    Balance Assessment sitting static balance; sitting dynamic balance; standing static balance; standing dynamic balance  -CS     Static Sitting Balance WNL; unsupported; sitting, edge of bed  -CS     Dynamic Sitting Balance WFL; unsupported; sitting, edge of bed  -CS     Static Standing Balance WFL; supported; standing  -CS     Dynamic Standing Balance mild impairment; unsupported; standing  -CS     Balance Interventions sitting; sit to stand; standing; occupation based/functional task  -CS           User Key  (r) = Recorded By, (t) = Taken By, (c) = Cosigned By    Initials Name Provider Type    CS Matthew Sloan, OT Occupational Therapist               Goals/Plan     Row Name 12/10/21 7504          Transfer Goal 1 (OT)    Activity/Assistive Device (Transfer Goal 1, OT) sit-to-stand/stand-to-sit; toilet  -CS     [place] Level/Cues Needed (Transfer Goal 1, OT) modified independence  -CS     Time Frame (Transfer Goal 1, OT) long term goal (LTG); by discharge  -CS     Progress/Outcome (Transfer Goal 1, OT) goal ongoing  -CS     Row Name 12/10/21 8125          Dressing Goal 1 (OT)    Activity/Device (Dressing Goal 1, OT) lower body dressing; long-handled shoe horn; reacher; sock-aid  -CS     [place]/Cues Needed (Dressing Goal 1, OT) modified independence  -CS     Time Frame (Dressing Goal 1, OT) long term goal (LTG); by discharge  -CS     Strategies/Barriers (Dressing Goal 1, OT) AE issued  -CS     Progress/Outcome (Dressing Goal 1, OT) goal ongoing  -CS     Row Name 12/10/21 7403          Grooming Goal 1 (OT)    Activity/Device (Grooming Goal 1, OT) hair care; oral care; wash face, hands  -CS     [place] (Grooming Goal 1, OT) modified independence  -CS     Time Frame (Grooming Goal 1, OT) long term goal (LTG); by discharge  -CS     Strategies/Barriers  (Grooming Goal 1, OT) tolerated 4 minute sinkside grooming ADLs w/ stable O2 sats and safe AD positioning  -CS     Progress/Outcome (Grooming Goal 1, OT) goal ongoing  -CS     Row Name 12/10/21 5081          Therapy Assessment/Plan (OT)    Planned Therapy Interventions (OT) functional balance retraining; occupation/activity based interventions; strengthening exercise; transfer/mobility retraining; patient/caregiver education/training; adaptive equipment training  -CS           User Key  (r) = Recorded By, (t) = Taken By, (c) = Cosigned By    Initials Name Provider Type    CS Matthew Sloan, OT Occupational Therapist               Clinical Impression     Row Name 12/10/21 3935          Pain Assessment    Additional Documentation Pain Scale: FACES Pre/Post-Treatment (Group)  -     Row Name 12/10/21 5085          Pain Scale: FACES Pre/Post-Treatment    Pain: FACES Scale, Pretreatment 2-->hurts little bit  -CS     Posttreatment Pain Rating 2-->hurts little bit  -CS     Pain Location abdomen  -CS     Pre/Posttreatment Pain Comment tolerated  -CS     Row Name 12/10/21 7592          Plan of Care Review    Plan of Care Reviewed With patient  -CS     Progress no change  OT eval  -CS     Outcome Summary OT evaluation completed. Independent ADL completion impacted by limitted reach to distal BLEs and decreased functional endurance warranting skilled IP OT services to promote return to PLOF. Pt issued AE and education/training for safe use during LB ADLs. Rest break required and SOA observed during all standing activities. Pt would benefit from short IP rehab stay for best functional outcomes.  -CS     Row Name 12/10/21 5954          Therapy Assessment/Plan (OT)    Rehab Potential (OT) good, to achieve stated therapy goals  -CS     Criteria for Skilled Therapeutic Interventions Met (OT) yes; meets criteria  -CS     Therapy Frequency (OT) daily  -CS     Row Name 12/10/21 2606          Therapy Plan Review/Discharge Plan (OT)     Anticipated Discharge Disposition (OT) inpatient rehabilitation facility  -     Row Name 12/10/21 1449          Vital Signs    Pre Systolic BP Rehab --  RN cleared for tx, VSS on RA  -CS     O2 Delivery Pre Treatment room air  -CS     O2 Delivery Intra Treatment room air  -CS     O2 Delivery Post Treatment room air  -CS     Pre Patient Position Sitting  -CS     Intra Patient Position Standing  -CS     Post Patient Position Supine  -CS     Row Name 12/10/21 1449          Positioning and Restraints    Pre-Treatment Position in bed  -CS     Post Treatment Position bed  -CS     In Bed notified nsg; supine; call light within reach; encouraged to call for assist; exit alarm on  -CS           User Key  (r) = Recorded By, (t) = Taken By, (c) = Cosigned By    Initials Name Provider Type    Matthew Samaniego, OT Occupational Therapist               Outcome Measures     Row Name 12/10/21 9897          How much help from another is currently needed...    Putting on and taking off regular lower body clothing? 3  -CS     Bathing (including washing, rinsing, and drying) 3  -CS     Toileting (which includes using toilet bed pan or urinal) 3  -CS     Putting on and taking off regular upper body clothing 4  -CS     Taking care of personal grooming (such as brushing teeth) 4  -CS     Eating meals 4  -CS     AM-PAC 6 Clicks Score (OT) 21  -CS     Row Name 12/10/21 0800          How much help from another person do you currently need...    Turning from your back to your side while in flat bed without using bedrails? 3  -HS     Moving from lying on back to sitting on the side of a flat bed without bedrails? 3  -HS     Moving to and from a bed to a chair (including a wheelchair)? 3  -HS     Standing up from a chair using your arms (e.g., wheelchair, bedside chair)? 3  -HS     Climbing 3-5 steps with a railing? 3  -HS     To walk in hospital room? 3  -HS     AM-PAC 6 Clicks Score (PT) 18  -HS     Row Name 12/10/21 2412           Functional Assessment    Outcome Measure Options AM-PAC 6 Clicks Daily Activity (OT)  -CS           User Key  (r) = Recorded By, (t) = Taken By, (c) = Cosigned By    Initials Name Provider Type    Kirstin Garcia RN Registered Nurse    Matthew Samaniego OT Occupational Therapist                  OT Recommendation and Plan  Planned Therapy Interventions (OT): functional balance retraining, occupation/activity based interventions, strengthening exercise, transfer/mobility retraining, patient/caregiver education/training, adaptive equipment training  Therapy Frequency (OT): daily  Plan of Care Review  Plan of Care Reviewed With: patient  Progress: no change (OT eval)  Outcome Summary: OT evaluation completed. Independent ADL completion impacted by limitted reach to distal BLEs and decreased functional endurance warranting skilled IP OT services to promote return to PLOF. Pt issued AE and education/training for safe use during LB ADLs. Rest break required and SOA observed during all standing activities. Pt would benefit from short IP rehab stay for best functional outcomes.     Time Calculation:    Time Calculation- OT     Row Name 12/10/21 1457             Time Calculation- OT    OT Start Time 1400  -CS      OT Received On 12/10/21  -CS      OT Goal Re-Cert Due Date 12/20/21  -CS              Timed Charges    55550 - OT Self Care/Mgmt Minutes 10  -CS              Untimed Charges    OT Eval/Re-eval Minutes 40  -CS              Total Minutes    Timed Charges Total Minutes 10  -CS      Untimed Charges Total Minutes 40  -CS       Total Minutes 50  -CS            User Key  (r) = Recorded By, (t) = Taken By, (c) = Cosigned By    Initials Name Provider Type    CS Matthew Sloan OT Occupational Therapist              Therapy Charges for Today     Code Description Service Date Service Provider Modifiers Qty    [id number]  OT SELF CARE/MGMT/TRAIN EA 15 MIN 12/10/2021 Matthew Sloan OT GO 1    [id number]  OT  EVAL LOW COMPLEXITY 3 12/10/2021 Matthew Sloan, OT GO 1               Matthew Sloan, OT  12/10/2021

## 2021-12-10 NOTE — PROGRESS NOTES
"IM progress note      Blane Dietz  [id number]  1935    Late entry for visit 21.    Attending: José Miguel Villafuerte Jr*    Primary Care Provider: José Miguel Villafuerte Jr., MD      Chief Complaint/Reason for visit:  Abd pain    Subjective   Doing well. Marginal appetite. Not moving around very well. Passed flatus. No nausea/vom. JANA drain with slightly high output.     Objective        Visit Vitals  /89 (BP Location: Left arm, Patient Position: Lying)   Pulse 79   Temp 98.2 °F (36.8 °C) (Oral)   Resp 18   Ht 167.6 cm (66\")   Wt 88.9 kg (196 lb)   SpO2 96%   BMI 31.64 kg/m²     Temp (24hrs), Av.3 °F (36.8 °C), Min:98.1 °F (36.7 °C), Max:98.5 °F (36.9 °C)       Physical Exam:     General Appearance:    Alert, cooperative, in no acute distress   Head:    Normocephalic, without obvious abnormality, atraumatic    Lungs:     Normal effort, symmetric chest rise, no crepitus, clear to      auscultation bilaterally             Heart:    Regular rhythm and normal rate, normal S1 and S2   Abdomen:     Soft, mild distention. Lap sites CDI. JANA present  : marcano- yellow UOP   Extremities:   No clubbing, cyanosis or edema.  No deformities.    Pulses:   Pulses palpable and equal bilaterally   Skin:   No bleeding, bruising or rash   Neurologic:   Moves all extremities with no obvious focal motor deficit.  Cranial nerves 2 - 12 grossly intact     Results Review:     I reviewed the patient's new clinical results.   Results from last 7 days   Lab Units 21  0819 21  1224 21  0908   WBC 10*3/mm3 16.57* 11.58* 15.80*   HEMOGLOBIN g/dL 12.4* 13.0 14.5   HEMATOCRIT % 38.6 41.3 43.6   PLATELETS 10*3/mm3 161 155 167     Results from last 7 days   Lab Units 21  0418 21  0819 21  1224 21  0908 21  0908   SODIUM mmol/L 135* 143 141   < > 141   POTASSIUM mmol/L 4.1 4.3 3.8   < > 3.6   CHLORIDE mmol/L 103 107 102   < > 99   CO2 mmol/L 24.0 27.0 28.0   < > 28.0   BUN mg/dL 23 22 " 21   < > 18   CREATININE mg/dL 1.25 1.36* 1.20   < > 0.92   CALCIUM mg/dL 7.8* 8.2* 8.6   < > 10.1   BILIRUBIN mg/dL  --   --   --   --  1.1   ALK PHOS U/L  --   --   --   --  75   ALT (SGPT) U/L  --   --   --   --  25   AST (SGOT) U/L  --   --   --   --  27   GLUCOSE mg/dL 107* 140* 195*   < > 164*    < > = values in this interval not displayed.        I reviewed the patient's new imaging including images and reports.    All medications reviewed.   !NOT ORDERED- apixaban (ELIQUIS), , Does not apply, Daily With Dinner  allopurinol, 300 mg, Oral, Daily  amLODIPine, 5 mg, Oral, Daily  aztreonam, 2 g, Intravenous, Q8H  enoxaparin, 40 mg, Subcutaneous, Daily  fluticasone, 2 spray, Nasal, Daily  insulin detemir, 20 Units, Subcutaneous, Daily  insulin lispro, 0-7 Units, Subcutaneous, TID AC  insulin lispro, 5 Units, Subcutaneous, TID With Meals  metoprolol succinate XL, 100 mg, Oral, Daily  pantoprazole, 40 mg, Oral, Q AM  triamterene-hydrochlorothiazide, 1 tablet, Oral, Daily        Assessment/Plan     s/p right nepheroureterectomy and adrenalectomy     S/P AVR    HLD (hyperlipidemia)    T2DM (type 2 diabetes mellitus) (HCC)    Leukocytosis    Right kidney mass    Acute postoperative pain    Renal insufficiency      Plan  1. Ambulation/ encouraged.  2. Pain control-prns   3. IS-encouraged  4. DVT proph- mechs/Lovenox  5. Bowel regimen  6. ADAT.  Soft diet .  7. Monitor post-op labs  8. DC planning , pending improved mobility and stable PO intake.    -Dyslipidemia:  Resume home regimen statin ( formulary substitution when appropriate).     - Hypertension:  Resume home medications as appropriate, formulary substitution when indicated.  Holding parameters.  Prn medications for elevated blood pressure.     -DM type 2, uncontrolled  Hgb A1C 9.5  Hold OHA as appropriate  FSBG AC/HS, ( q 6 when NPO), along with correction humalog.  Long acting insulin, mealtime insulin, dose adjustment as indicated.    - RI  Monitor.  Avoid  nephrotoxic agents  Monitor UOP      Milo Urias MD  12/10/21  08:56 EST

## 2021-12-10 NOTE — PLAN OF CARE
Problem: Adult Inpatient Plan of Care  Goal: Plan of Care Review  Recent Flowsheet Documentation  Taken 12/10/2021 1449 by Matthew Sloan, OT  Progress: (OT eval) no change  Plan of Care Reviewed With: patient  Outcome Summary: OT evaluation completed. Independent ADL completion impacted by limitted reach to distal BLEs and decreased functional endurance warranting skilled IP OT services to promote return to PLOF. Pt issued AE and education/training for safe use during LB ADLs. Rest break required and SOA observed during all standing activities. Pt would benefit from short IP rehab stay for best functional outcomes.

## 2021-12-10 NOTE — PLAN OF CARE
Goal Outcome Evaluation:  Plan of Care Reviewed With: patient        Progress: improving  Outcome Summary: VSS, 2L NC while awake and BiPAP while asleep. Pt only reports pain during activity. Still passing flatus.

## 2021-12-10 NOTE — CASE MANAGEMENT/SOCIAL WORK
Discharge Planning Assessment  [facility]     Patient Name: Blane Dietz  MRN: [id number]  Today's Date: 12/10/2021    Admit Date: 12/7/2021     Discharge Needs Assessment    No documentation.                Discharge Plan     Row Name 12/10/21 2079       Plan    Plan SNF    Patient/Family in Agreement with Plan yes    Plan Comments Called & spoke wtih Mr Dietz's daughter, Susana. She stated that Mr Dietz can't come home with her as it wouldn't be a safe DC. She works and no one will be home with him. Both he and Susana are agreeable to SNF for STR. Called and spoke with Nat with PT and he ambulated in the hallway 400ft. His sats did drop on RA. OT consult had already been placed, but they hadn't eval yet. Emailed the rehab managers and an OT will eval/treat him today. Called Hilda, with [facility] and made a referral. I informed her that we are waiting on therapy notes. He will need an insurance precert. Anticipate he will be here through the weekend.    Final Discharge Disposition Code 30 - still a patient              Continued Care and Services - Admitted Since 12/7/2021     Destination     Service Provider Request Status Selected Services Address Phone Fax Patient Preferred    [facility]  Pending - No Request Sent N/A [address] [facility] [address] [phone] --       Internal Comment last updated by Leatha Uribe, RN 12/10/2021 1139    12/10 referral called               [facility]  Pending - No Request Sent N/A [address] [facility] [address] [phone] --       Internal Comment last updated by Leatha Uribe, RN 12/10/2021 1139    12/10 referral called                            Demographic Summary    No documentation.                Functional Status    No documentation.                Psychosocial    No documentation.                Abuse/Neglect    No documentation.                Legal    No  documentation.                Substance Abuse    No documentation.                Patient Forms    No documentation.                   Leatha Uribe RN

## 2021-12-10 NOTE — THERAPY EVALUATION
Patient Name: Blane Dietz  : 1935    MRN: [id number]                              Today's Date: 12/10/2021       Admit Date: 2021    Visit Dx:     ICD-10-CM ICD-9-CM   1. Right kidney mass  N28.89 593.9   2. Renal mass  N28.89 593.9     Patient Active Problem List   Diagnosis   • Essential hypertension   • Uncontrolled type 2 diabetes mellitus with hyperglycemia (HCC)   • Prostate cancer (HCC)   • Sleep apnea   • S/P AVR   • Pulmonary embolism (HCC)   • HLD (hyperlipidemia)   • T2DM (type 2 diabetes mellitus) (HCC)   • Leukocytosis   • Elevated LFTs   • Right kidney mass   • Acute respiratory failure with hypoxia (HCC)   • Pneumonia   • s/p right nepheroureterectomy and adrenalectomy    • Acute postoperative pain   • Renal insufficiency     Past Medical History:   Diagnosis Date   • Aortic stenosis     status post ROSS procedure, remote, with 2015 echocardiography revealing normal aortic and pulmonary valve function, normal ejection fraction and mild to moderate MR   • Arthritis    • Bilateral impacted cerumen 2016   • Bronchitis    • Chronic gout of right foot 2016    Impression: 2016 - stable.;    • COVID-19 vaccine series completed 2021    Maderna 2nd dose 3/12/21.   • Depression    • Diabetes mellitus (HCC)    • Diverticulitis    • Exogenous obesity    • Gout    • Heart murmur    • History of vitamin D deficiency    • Hypercholesteremia 2016   • Hyperlipidemia    • Hypertension    • Kidney lesion    • Malignant neoplasm of prostate (HCC)    • Morbid obesity (HCC) 2016   • Pneumonia 2021   • Primary osteoarthritis involving multiple joints 2016    Impression: 2016 - stable;    • Pulmonary embolism (HCC)    • Sleep apnea 2021    C-Pap   • Uncontrolled type 2 diabetes mellitus with hyperglycemia (HCC) 2016    Impression: 2016 - seeing Endo .;    • Vertigo      Past Surgical History:   Procedure Laterality Date   • CARDIAC  VALVE REPLACEMENT  05/12/2021    Ross procedure 22 years ago   • COLON SURGERY     • COLONOSCOPY     • COLOSTOMY  1999   • COLOSTOMY REVISION     • CYSTOSCOPY N/A 12/7/2021    Procedure: CYSTOSCOPY FLEXIBLE;  Surgeon: José Miguel Villafuerte Jr., MD;  Location:  [facility];  Service: Urology;  Laterality: N/A;   • EXPLORATORY LAPAROTOMY      With Tissue Removal   • LIPOMA EXCISION     • MOHS SURGERY     • NEPHROURETERECTOMY Right 12/7/2021    Procedure: RIGHT NEPHROURETERECTOMY LAPAROSCOPIC WITH DAVINCI ROBOT;  Surgeon: José Miguel Villafuerte Jr., MD;  Location:  [facility];  Service: Robotics - DaVinci;  Laterality: Right;   • OTHER SURGICAL HISTORY      Porcine Valve   • PROSTATE SURGERY     • PROSTATECTOMY  2000     History of Prostatectomy Perineal Radical   • SKIN CANCER EXCISION      from head and lip   • TONSILLECTOMY        General Information     Row Name 12/10/21 1535          Physical Therapy Time and Intention    Document Type evaluation  -KG     Mode of Treatment physical therapy  -KG     Row Name 12/10/21 1535          General Information    Patient Profile Reviewed yes  -KG     Prior Level of Function independent:; all household mobility; gait; transfer; ADL's; dressing; bathing  -KG     Existing Precautions/Restrictions fall; other (see comments)  JANA drain; abdominal incision  -KG     Barriers to Rehab medically complex  -KG     Row Name 12/10/21 1535          Living Environment    Lives With alone  -KG     Row Name 12/10/21 1535          Home Main Entrance    Number of Stairs, Main Entrance --  ramp to enter  -KG     Row Name 12/10/21 1535          Stairs Within Home, Primary    Number of Stairs, Within Home, Primary other (see comments)  12  -KG     Stair Railings, Within Home, Primary railing on right side (ascending)  -KG     Row Name 12/10/21 1535          Cognition    Orientation Status (Cognition) oriented x 4  -KG     Row Name 12/10/21 1535          Safety Issues, Functional Mobility    Safety  Issues Affecting Function (Mobility) awareness of need for assistance; insight into deficits/self-awareness; safety precaution awareness; safety precautions follow-through/compliance  -KG     Impairments Affecting Function (Mobility) balance; endurance/activity tolerance; postural/trunk control; shortness of breath; strength  -KG           User Key  (r) = Recorded By, (t) = Taken By, (c) = Cosigned By    Initials Name Provider Type    KG Dolly Akhtar, PT Physical Therapist               Mobility     Row Name 12/10/21 1537          Bed Mobility    Bed Mobility supine-sit; sit-supine  -KG     Supine-Sit [place] (Bed Mobility) contact guard; verbal cues  -KG     Sit-Supine [place] (Bed Mobility) minimum assist (75% patient effort); verbal cues  -KG     Assistive Device (Bed Mobility) bed rails; head of bed elevated  -KG     Comment (Bed Mobility) VC's for sequencing and technique. Pt required Sudha with BLEs when returning to supine position.  -KG     Row Name 12/10/21 1537          Transfers    Comment (Transfers) VC's for sequencing and safe hand placement. Toilet transfer in bathroom with RW and CGA.  -KG     Row Name 12/10/21 1537          Sit-Stand Transfer    Sit-Stand [place] (Transfers) contact guard; verbal cues  -KG     Assistive Device (Sit-Stand Transfers) walker, front-wheeled  -KG     Row Name 12/10/21 1537          Gait/Stairs (Locomotion)    [place] Level (Gait) contact guard; verbal cues  -KG     Assistive Device (Gait) walker, front-wheeled  -KG     Distance in Feet (Gait) 400  -KG     Deviations/Abnormal Patterns (Gait) base of support, narrow; binta decreased; stride length decreased  -KG     Bilateral Gait Deviations forward flexed posture; heel strike decreased  -KG     Comment (Gait/Stairs) Pt demonstrated step through gait pattern with slow binta and decreased step length. VC's for upright posture and to keep RW close with feet inside middle. Pt with c/o SOA  throughout ambulation; O2 sats in upper 80s throughout ambulation. Pt required three standing rest breaks and one seated rest break due to increased SOA.  -KG           User Key  (r) = Recorded By, (t) = Taken By, (c) = Cosigned By    Initials Name Provider Type    Dolly Delacruz PT Physical Therapist               Obj/Interventions     Row Name 12/10/21 1539          Range of Motion Comprehensive    Comment, General Range of Motion BLE WFL  -KG     Row Name 12/10/21 1539          Strength Comprehensive (MMT)    Comment, General Manual Muscle Testing (MMT) Assessment BLE grossly 3+/5  -KG     Row Name 12/10/21 1539          Balance    Balance Assessment sitting static balance; standing static balance; standing dynamic balance  -KG     Static Sitting Balance WFL; sitting, edge of bed  -KG     Static Standing Balance WFL; supported; standing  -KG     Dynamic Standing Balance mild impairment; supported; standing  -KG     Row Name 12/10/21 1539          Sensory Assessment (Somatosensory)    Sensory Assessment (Somatosensory) LE sensation intact  -KG           User Key  (r) = Recorded By, (t) = Taken By, (c) = Cosigned By    Initials Name Provider Type    Dolly Delacruz, PT Physical Therapist               Goals/Plan     Row Name 12/10/21 1542          Bed Mobility Goal 1 (PT)    Activity/Assistive Device (Bed Mobility Goal 1, PT) sit to supine; supine to sit; bed rails  -KG     [place] Level/Cues Needed (Bed Mobility Goal 1, PT) modified independence  -KG     Time Frame (Bed Mobility Goal 1, PT) 2 weeks  -KG     Progress/Outcomes (Bed Mobility Goal 1, PT) goal ongoing  -KG     Row Name 12/10/21 1542          Transfer Goal 1 (PT)    Activity/Assistive Device (Transfer Goal 1, PT) sit-to-stand/stand-to-sit; bed-to-chair/chair-to-bed; walker, rolling  -KG     [place] Level/Cues Needed (Transfer Goal 1, PT) modified independence  -KG     Time Frame (Transfer Goal 1, PT) 2 weeks  -KG      Progress/Outcome (Transfer Goal 1, PT) goal ongoing  -KG     Row Name 12/10/21 1542          Gait Training Goal 1 (PT)    Activity/Assistive Device (Gait Training Goal 1, PT) gait (walking locomotion); assistive device use; walker, rolling  -KG     [place] Level (Gait Training Goal 1, PT) supervision required  -KG     Distance (Gait Training Goal 1, PT) 400 feet  -KG     Time Frame (Gait Training Goal 1, PT) 2 weeks  -KG     Progress/Outcome (Gait Training Goal 1, PT) goal ongoing  -KG           User Key  (r) = Recorded By, (t) = Taken By, (c) = Cosigned By    Initials Name Provider Type    KG Dolly Akhtar N, PT Physical Therapist               Clinical Impression     Row Name 12/10/21 8214          Pain    Additional Documentation Pain Scale: Numbers Pre/Post-Treatment (Group)  -KG     Row Name 12/10/21 7602          Pain Scale: Numbers Pre/Post-Treatment    Pretreatment Pain Rating 0/10 - no pain  -KG     Posttreatment Pain Rating 0/10 - no pain  -KG     Row Name 12/10/21 2095          Plan of Care Review    Plan of Care Reviewed With patient  -KG     Outcome Summary PT initial evaluation completed for pt s/p nephroureterectomy presenting with generalized weakness, SOA, and decreased functional mobility. Pt ambulated 400ft with RW and CGA. Required multiple standing rest breaks and one seated rest break due to increased SOA. Pt's decreased independence warrants PT skilled care. Recommend D/C to IP rehab for best functional outcomes.  -KG     Row Name 12/10/21 2120          Therapy Assessment/Plan (PT)    Patient/Family Therapy Goals Statement (PT) return to PLOF  -KG     Rehab Potential (PT) good, to achieve stated therapy goals  -KG     Criteria for Skilled Interventions Met (PT) yes; skilled treatment is necessary  -KG     Row Name 12/10/21 3569          Vital Signs    Pre Systolic BP Rehab 139  -KG     Pre Treatment Diastolic BP 68  -KG     Pretreatment Heart Rate (beats/min) 83  -KG      Posttreatment Heart Rate (beats/min) 90  -KG     Pre SpO2 (%) 90  -KG     O2 Delivery Pre Treatment room air  -KG     Intra SpO2 (%) 86  -KG     O2 Delivery Intra Treatment room air  -KG     Post SpO2 (%) 90  -KG     O2 Delivery Post Treatment room air  -KG     Pre Patient Position Supine  -KG     Intra Patient Position Standing  -KG     Post Patient Position Supine  -KG     Row Name 12/10/21 1539          Positioning and Restraints    Pre-Treatment Position in bed  -KG     Post Treatment Position bed  -KG     In Bed notified nsg; supine; call light within reach; encouraged to call for assist; exit alarm on; side rails up x3  -KG           User Key  (r) = Recorded By, (t) = Taken By, (c) = Cosigned By    Initials Name Provider Type    Dolly Delacruz, PT Physical Therapist               Outcome Measures     Row Name 12/10/21 1543 12/10/21 0800       How much help from another person do you currently need...    Turning from your back to your side while in flat bed without using bedrails? 3  -KG 3  -HS    Moving from lying on back to sitting on the side of a flat bed without bedrails? 3  -KG 3  -HS    Moving to and from a bed to a chair (including a wheelchair)? 3  -KG 3  -HS    Standing up from a chair using your arms (e.g., wheelchair, bedside chair)? 3  -KG 3  -HS    Climbing 3-5 steps with a railing? 2  -KG 3  -HS    To walk in hospital room? 3  -KG 3  -HS    AM-PAC 6 Clicks Score (PT) 17  -KG 18  -HS    Row Name 12/10/21 1543 12/10/21 1455       Functional Assessment    Outcome Measure Options AM-PAC 6 Clicks Basic Mobility (PT)  -KG AM-PAC 6 Clicks Daily Activity (OT)  -CS          User Key  (r) = Recorded By, (t) = Taken By, (c) = Cosigned By    Initials Name Provider Type    Kirstin Garcia RN Registered Nurse    Dolly Delacruz, PT Physical Therapist    Matthew Samaniego OT Occupational Therapist                             Physical Therapy Education                 Title: PT OT SLP  Therapies (In Progress)     Topic: Physical Therapy (In Progress)     Point: Mobility training (In Progress)     Learning Progress Summary           Patient Acceptance, E, NR by KG at 12/10/2021 1004                   Point: Home exercise program (Not Started)     Learner Progress:  Not documented in this visit.          Point: Body mechanics (In Progress)     Learning Progress Summary           Patient Acceptance, E, NR by KG at 12/10/2021 1004                   Point: Precautions (In Progress)     Learning Progress Summary           Patient Acceptance, E, NR by KG at 12/10/2021 1004                               User Key     Initials Effective Dates Name Provider Type Discipline    KG 05/22/20 -  Dolly Akhtar, PT Physical Therapist PT              PT Recommendation and Plan  Planned Therapy Interventions (PT): balance training, bed mobility training, gait training, strengthening, transfer training  Plan of Care Reviewed With: patient  Outcome Summary: PT initial evaluation completed for pt s/p nephroureterectomy presenting with generalized weakness, SOA, and decreased functional mobility. Pt ambulated 400ft with RW and CGA. Required multiple standing rest breaks and one seated rest break due to increased SOA. Pt's decreased independence warrants PT skilled care. Recommend D/C to IP rehab for best functional outcomes.     Time Calculation:    PT Charges     Row Name 12/10/21 1004             Time Calculation    Start Time 1004  -KG      PT Received On 12/10/21  -KG      PT Goal Re-Cert Due Date 12/20/21  -KG              Time Calculation- PT    Total Timed Code Minutes- PT 15 minute(s)  -KG              Timed Charges    96313 - Gait Training Minutes  10  -KG      06873 - PT Therapeutic Activity Minutes 5  -KG              Untimed Charges    PT Eval/Re-eval Minutes 31  -KG              Total Minutes    Timed Charges Total Minutes 15  -KG      Untimed Charges Total Minutes 31  -KG       Total Minutes 46  -KG             User Key  (r) = Recorded By, (t) = Taken By, (c) = Cosigned By    Initials Name Provider Type    KG Dolly Akhtar, PT Physical Therapist              Therapy Charges for Today     Code Description Service Date Service Provider Modifiers Qty    15845656879 HC GAIT TRAINING EA 15 MIN 12/10/2021 Dolly Akhtar, PT GP 1    [id number] HC PT EVAL LOW COMPLEXITY 3 12/10/2021 Dolly Akhtar, PT GP 1          PT G-Codes  Outcome Measure Options: AM-PAC 6 Clicks Basic Mobility (PT)  AM-PAC 6 Clicks Score (PT): 17  AM-PAC 6 Clicks Score (OT): 21    Ashley Akhtar PT  12/10/2021

## 2021-12-10 NOTE — PROGRESS NOTES
Patient is doing well this morning with good pain control  Tolerating diet with flatus  Feels weak and is interested in rehab placement    Abdomen benign  Incisions healing well    JANA drain with slightly elevated output    Urine output adequate      Bailey catheter removal today  Needs to ambulate  Consider rehab placement  Okay for discharge from my standpoint

## 2021-12-11 LAB
GLUCOSE BLDC GLUCOMTR-MCNC: 119 MG/DL (ref 70–130)
GLUCOSE BLDC GLUCOMTR-MCNC: 174 MG/DL (ref 70–130)
GLUCOSE BLDC GLUCOMTR-MCNC: 237 MG/DL (ref 70–130)
GLUCOSE BLDC GLUCOMTR-MCNC: 246 MG/DL (ref 70–130)

## 2021-12-11 PROCEDURE — 82962 GLUCOSE BLOOD TEST: CPT

## 2021-12-11 PROCEDURE — 25010000002 ENOXAPARIN PER 10 MG: Performed by: UROLOGY

## 2021-12-11 PROCEDURE — 94660 CPAP INITIATION&MGMT: CPT

## 2021-12-11 PROCEDURE — 63710000001 INSULIN DETEMIR PER 5 UNITS: Performed by: INTERNAL MEDICINE

## 2021-12-11 PROCEDURE — 94799 UNLISTED PULMONARY SVC/PX: CPT

## 2021-12-11 PROCEDURE — 63710000001 INSULIN LISPRO (HUMAN) PER 5 UNITS: Performed by: INTERNAL MEDICINE

## 2021-12-11 RX ADMIN — PANTOPRAZOLE SODIUM 40 MG: 40 TABLET, DELAYED RELEASE ORAL at 08:59

## 2021-12-11 RX ADMIN — INSULIN LISPRO 5 UNITS: 100 INJECTION, SOLUTION INTRAVENOUS; SUBCUTANEOUS at 16:33

## 2021-12-11 RX ADMIN — ALLOPURINOL 300 MG: 300 TABLET ORAL at 08:58

## 2021-12-11 RX ADMIN — INSULIN LISPRO 5 UNITS: 100 INJECTION, SOLUTION INTRAVENOUS; SUBCUTANEOUS at 11:27

## 2021-12-11 RX ADMIN — TRIAMTERENE AND HYDROCHLOROTHIAZIDE 1 TABLET: 37.5; 25 TABLET ORAL at 08:59

## 2021-12-11 RX ADMIN — INSULIN LISPRO 2 UNITS: 100 INJECTION, SOLUTION INTRAVENOUS; SUBCUTANEOUS at 11:26

## 2021-12-11 RX ADMIN — METOPROLOL SUCCINATE 100 MG: 100 TABLET, EXTENDED RELEASE ORAL at 08:59

## 2021-12-11 RX ADMIN — INSULIN DETEMIR 20 UNITS: 100 INJECTION, SOLUTION SUBCUTANEOUS at 08:59

## 2021-12-11 RX ADMIN — INSULIN LISPRO 3 UNITS: 100 INJECTION, SOLUTION INTRAVENOUS; SUBCUTANEOUS at 16:32

## 2021-12-11 RX ADMIN — AMLODIPINE BESYLATE 5 MG: 5 TABLET ORAL at 08:58

## 2021-12-11 RX ADMIN — ENOXAPARIN SODIUM 40 MG: 40 INJECTION SUBCUTANEOUS at 08:59

## 2021-12-11 NOTE — PROGRESS NOTES
"IM progress note      Blane Dietz  [id number]  1935         Attending: José Miguel Villafuerte Jr*    Primary Care Provider: José Miguel Villafuerte Jr., MD      Chief Complaint/Reason for visit:  Abd pain    Subjective   Doing well. Mild abd discomfort. Denies f/c/n/v/sob/cp.    Objective        Visit Vitals  /57 (BP Location: Right arm, Patient Position: Lying)   Pulse 76   Temp 98.4 °F (36.9 °C) (Oral)   Resp 18   Ht 167.6 cm (66\")   Wt 88.9 kg (196 lb)   SpO2 96%   BMI 31.64 kg/m²     Temp (24hrs), Av °F (36.7 °C), Min:97.5 °F (36.4 °C), Max:98.4 °F (36.9 °C)       Physical Exam:     General Appearance:    Alert, cooperative, in no acute distress   Head:    Normocephalic, without obvious abnormality, atraumatic    Lungs:     Normal effort, symmetric chest rise, no crepitus, clear to      auscultation bilaterally             Heart:    Regular rhythm and normal rate, normal S1 and S2   Abdomen:     Soft, mild distention. Lap sites CDI. JANA present  : marcano- yellow UOP   Extremities:   No clubbing, cyanosis or edema.  No deformities.    Pulses:   Pulses palpable and equal bilaterally   Skin:   No bleeding, bruising or rash   Neurologic:   Moves all extremities with no obvious focal motor deficit.  Cranial nerves 2 - 12 grossly intact     Results Review:     I reviewed the patient's new clinical results.   Results from last 7 days   Lab Units 12/10/21  1158 21  0819 21  1224   WBC 10*3/mm3 14.45* 16.57* 11.58*   HEMOGLOBIN g/dL 12.7* 12.4* 13.0   HEMATOCRIT % 39.5 38.6 41.3   PLATELETS 10*3/mm3 171 161 155     Results from last 7 days   Lab Units 12/10/21  1158 21  0418 21  0819 21  1224 21  0908   SODIUM mmol/L 137 135* 143   < > 141   POTASSIUM mmol/L 4.7 4.1 4.3   < > 3.6   CHLORIDE mmol/L 105 103 107   < > 99   CO2 mmol/L 24.0 24.0 27.0   < > 28.0   BUN mg/dL 26* 23 22   < > 18   CREATININE mg/dL 1.22 1.25 1.36*   < > 0.92   CALCIUM mg/dL 8.2* 7.8* 8.2*   < > " 10.1   BILIRUBIN mg/dL  --   --   --   --  1.1   ALK PHOS U/L  --   --   --   --  75   ALT (SGPT) U/L  --   --   --   --  25   AST (SGOT) U/L  --   --   --   --  27   GLUCOSE mg/dL 212* 107* 140*   < > 164*    < > = values in this interval not displayed.      Results for RC BLANDON (MRN [id number]) as of 12/11/2021 15:03   Ref. Range 12/10/2021 16:07 12/10/2021 20:23 12/10/2021 20:54 12/11/2021 07:28 12/11/2021 11:05   Glucose Latest Ref Range: 70 - 130 mg/dL 77 149 (H)  119 174 (H)     I reviewed the patient's new imaging including images and reports.    All medications reviewed.   !NOT ORDERED- apixaban (ELIQUIS), , Does not apply, Daily With Dinner  allopurinol, 300 mg, Oral, Daily  amLODIPine, 5 mg, Oral, Daily  enoxaparin, 40 mg, Subcutaneous, Daily  fluticasone, 2 spray, Nasal, Daily  insulin detemir, 20 Units, Subcutaneous, Daily  insulin lispro, 0-7 Units, Subcutaneous, TID AC  insulin lispro, 5 Units, Subcutaneous, TID With Meals  metoprolol succinate XL, 100 mg, Oral, Daily  pantoprazole, 40 mg, Oral, Q AM  triamterene-hydrochlorothiazide, 1 tablet, Oral, Daily        Assessment/Plan     s/p right nepheroureterectomy and adrenalectomy     Right kidney mass    S/P AVR    HLD (hyperlipidemia)    T2DM (type 2 diabetes mellitus) (HCC)    Leukocytosis    Acute postoperative pain    Renal insufficiency      Plan  1. Ambulation/ encouraged.  2. Pain control-prns   3. IS-encouraged  4. DVT proph- mechs/Lovenox  5. Bowel regimen  6. ADAT.  Soft diet .  7. Monitor post-op labs  8. DC planning for rehab. CM following    -Dyslipidemia:  Resume home regimen statin ( formulary substitution when appropriate).     - Hypertension:  Resume home medications as appropriate, formulary substitution when indicated.  Holding parameters.  Prn medications for elevated blood pressure.     -DM type 2, uncontrolled  Hgb A1C 9.5  Hold OHA as appropriate  FSBG AC/HS, ( q 6 when NPO), along with correction humalog.  Long acting  insulin, mealtime insulin, dose adjustment as indicated.    - RI  Monitor.  Avoid nephrotoxic agents  Monitor UOP      HIPOLITO Mcgill  12/11/21  15:02 EST

## 2021-12-11 NOTE — PLAN OF CARE
Goal Outcome Evaluation:           Progress: improving  Outcome Summary: Patient resting in bed and in the chair today. VSS and on 2L NC. No complaints of pain or nausea. JANA remains in place. No other needs at this time.

## 2021-12-12 LAB
GLUCOSE BLDC GLUCOMTR-MCNC: 128 MG/DL (ref 70–130)
GLUCOSE BLDC GLUCOMTR-MCNC: 186 MG/DL (ref 70–130)
GLUCOSE BLDC GLUCOMTR-MCNC: 190 MG/DL (ref 70–130)
GLUCOSE BLDC GLUCOMTR-MCNC: 292 MG/DL (ref 70–130)

## 2021-12-12 PROCEDURE — 82962 GLUCOSE BLOOD TEST: CPT

## 2021-12-12 PROCEDURE — 94799 UNLISTED PULMONARY SVC/PX: CPT

## 2021-12-12 PROCEDURE — 63710000001 INSULIN LISPRO (HUMAN) PER 5 UNITS: Performed by: INTERNAL MEDICINE

## 2021-12-12 PROCEDURE — 63710000001 INSULIN DETEMIR PER 5 UNITS: Performed by: INTERNAL MEDICINE

## 2021-12-12 PROCEDURE — 94660 CPAP INITIATION&MGMT: CPT

## 2021-12-12 RX ORDER — BISACODYL 5 MG/1
10 TABLET, DELAYED RELEASE ORAL DAILY PRN
Status: DISCONTINUED | OUTPATIENT
Start: 2021-12-12 | End: 2021-12-13 | Stop reason: HOSPADM

## 2021-12-12 RX ORDER — POLYETHYLENE GLYCOL 3350 17 G/17G
17 POWDER, FOR SOLUTION ORAL DAILY
Status: DISCONTINUED | OUTPATIENT
Start: 2021-12-12 | End: 2021-12-13 | Stop reason: HOSPADM

## 2021-12-12 RX ADMIN — INSULIN LISPRO 2 UNITS: 100 INJECTION, SOLUTION INTRAVENOUS; SUBCUTANEOUS at 11:39

## 2021-12-12 RX ADMIN — APIXABAN 5 MG: 5 TABLET, FILM COATED ORAL at 08:31

## 2021-12-12 RX ADMIN — METOPROLOL SUCCINATE 100 MG: 100 TABLET, EXTENDED RELEASE ORAL at 08:31

## 2021-12-12 RX ADMIN — FLUTICASONE PROPIONATE 2 SPRAY: 50 SPRAY, METERED NASAL at 08:31

## 2021-12-12 RX ADMIN — INSULIN LISPRO 5 UNITS: 100 INJECTION, SOLUTION INTRAVENOUS; SUBCUTANEOUS at 08:31

## 2021-12-12 RX ADMIN — AMLODIPINE BESYLATE 5 MG: 5 TABLET ORAL at 08:31

## 2021-12-12 RX ADMIN — ALLOPURINOL 300 MG: 300 TABLET ORAL at 08:31

## 2021-12-12 RX ADMIN — APIXABAN 5 MG: 5 TABLET, FILM COATED ORAL at 22:11

## 2021-12-12 RX ADMIN — INSULIN LISPRO 5 UNITS: 100 INJECTION, SOLUTION INTRAVENOUS; SUBCUTANEOUS at 11:39

## 2021-12-12 RX ADMIN — POLYETHYLENE GLYCOL 3350 17 G: 17 POWDER, FOR SOLUTION ORAL at 14:41

## 2021-12-12 RX ADMIN — TRIAMTERENE AND HYDROCHLOROTHIAZIDE 1 TABLET: 37.5; 25 TABLET ORAL at 08:31

## 2021-12-12 RX ADMIN — INSULIN DETEMIR 20 UNITS: 100 INJECTION, SOLUTION SUBCUTANEOUS at 08:32

## 2021-12-12 RX ADMIN — INSULIN LISPRO 5 UNITS: 100 INJECTION, SOLUTION INTRAVENOUS; SUBCUTANEOUS at 17:22

## 2021-12-12 RX ADMIN — INSULIN LISPRO 2 UNITS: 100 INJECTION, SOLUTION INTRAVENOUS; SUBCUTANEOUS at 17:22

## 2021-12-12 RX ADMIN — PANTOPRAZOLE SODIUM 40 MG: 40 TABLET, DELAYED RELEASE ORAL at 05:39

## 2021-12-12 NOTE — PROGRESS NOTES
"IM progress note      Blane Dietz  [id number]  1935         Attending: José Miguel Villafuerte Jr*    Primary Care Provider: José Miguel Villafuerte Jr., MD      Chief Complaint/Reason for visit:  Abd pain    Subjective   Doing well. No new complaints. Denies f/c/n/v/sob/cp.    Objective        Visit Vitals  /70 (BP Location: Right arm, Patient Position: Lying)   Pulse 65   Temp 98.4 °F (36.9 °C) (Oral)   Resp 18   Ht 167.6 cm (66\")   Wt 88.9 kg (196 lb)   SpO2 97%   BMI 31.64 kg/m²     Temp (24hrs), Av.4 °F (36.9 °C), Min:98.1 °F (36.7 °C), Max:98.6 °F (37 °C)       Physical Exam:     General Appearance:    Alert, cooperative, in no acute distress   Head:    Normocephalic, without obvious abnormality, atraumatic    Lungs:     Normal effort, symmetric chest rise, no crepitus, clear to      auscultation bilaterally             Heart:    Regular rhythm and normal rate, normal S1 and S2   Abdomen:     Soft, mild distention. Lap sites CDI. JANA present   Extremities:   No clubbing, cyanosis or edema.  No deformities.    Pulses:   Pulses palpable and equal bilaterally   Skin:   No bleeding, bruising or rash   Neurologic:   Moves all extremities with no obvious focal motor deficit.  Cranial nerves 2 - 12 grossly intact     Results Review:     I reviewed the patient's new clinical results.   Results from last 7 days   Lab Units 12/10/21  1158 21  0821  1224   WBC 10*3/mm3 14.45* 16.57* 11.58*   HEMOGLOBIN g/dL 12.7* 12.4* 13.0   HEMATOCRIT % 39.5 38.6 41.3   PLATELETS 10*3/mm3 171 161 155     Results from last 7 days   Lab Units 12/10/21  1158 21  0418 21  0819 21  1224 21  0908   SODIUM mmol/L 137 135* 143   < > 141   POTASSIUM mmol/L 4.7 4.1 4.3   < > 3.6   CHLORIDE mmol/L 105 103 107   < > 99   CO2 mmol/L 24.0 24.0 27.0   < > 28.0   BUN mg/dL 26* 23 22   < > 18   CREATININE mg/dL 1.22 1.25 1.36*   < > 0.92   CALCIUM mg/dL 8.2* 7.8* 8.2*   < > 10.1   BILIRUBIN mg/dL  --  "  --   --   --  1.1   ALK PHOS U/L  --   --   --   --  75   ALT (SGPT) U/L  --   --   --   --  25   AST (SGOT) U/L  --   --   --   --  27   GLUCOSE mg/dL 212* 107* 140*   < > 164*    < > = values in this interval not displayed.      Results for RC BLANDON (MRN [id number]) as of 12/12/2021 15:36   Ref. Range 12/11/2021 15:59 12/11/2021 19:43 12/12/2021 07:26 12/12/2021 11:01   Glucose Latest Ref Range: 70 - 130 mg/dL 246 (H) 237 (H) 128 190 (H)     I reviewed the patient's new imaging including images and reports.    All medications reviewed.   allopurinol, 300 mg, Oral, Daily  amLODIPine, 5 mg, Oral, Daily  apixaban, 5 mg, Oral, Q12H  fluticasone, 2 spray, Nasal, Daily  insulin detemir, 20 Units, Subcutaneous, Daily  insulin lispro, 0-7 Units, Subcutaneous, TID AC  insulin lispro, 5 Units, Subcutaneous, TID With Meals  metoprolol succinate XL, 100 mg, Oral, Daily  pantoprazole, 40 mg, Oral, Q AM  polyethylene glycol, 17 g, Oral, Daily  triamterene-hydrochlorothiazide, 1 tablet, Oral, Daily        Assessment/Plan     s/p right nepheroureterectomy and adrenalectomy     Right kidney mass    S/P AVR    HLD (hyperlipidemia)    T2DM (type 2 diabetes mellitus) (HCC)    Leukocytosis    Acute postoperative pain    Renal insufficiency      Plan  1. Ambulation/ encouraged.  2. Pain control-prns   3. IS-encouraged  4. DVT proph- mechs/Lovenox  5. Bowel regimen  6. ADAT.  Soft diet .  7. Monitor post-op labs  8. DC planning for rehab. CM following    -Dyslipidemia:  Resume home regimen statin ( formulary substitution when appropriate).     - Hypertension:  Resume home medications as appropriate, formulary substitution when indicated.  Holding parameters.  Prn medications for elevated blood pressure.     -DM type 2, uncontrolled  Hgb A1C 9.5  Hold OHA as appropriate  FSBG AC/HS, ( q 6 when NPO), along with correction humalog.  Long acting insulin, mealtime insulin, dose adjustment as indicated.    - RI  Monitor.  Avoid  nephrotoxic agents  Monitor UOP      HIPOLITO Mcgill  12/12/21  15:36 EST

## 2021-12-12 NOTE — PLAN OF CARE
Problem: Adult Inpatient Plan of Care  Goal: Plan of Care Review  Outcome: Ongoing, Progressing  Flowsheets (Taken 12/12/2021 1702)  Progress: improving  Plan of Care Reviewed With: patient  Outcome Summary: VSS on 2 L NC. No complants of pain or nausea this shift. JANA in place. Will continue plan of care.  Goal: Patient-Specific Goal (Individualized)  Outcome: Ongoing, Progressing  Goal: Absence of Hospital-Acquired Illness or Injury  Outcome: Ongoing, Progressing  Intervention: Identify and Manage Fall Risk  Recent Flowsheet Documentation  Taken 12/12/2021 1600 by Keerthi Musa RN  Safety Promotion/Fall Prevention:   activity supervised   assistive device/personal items within reach   clutter free environment maintained   fall prevention program maintained   gait belt   lighting adjusted   mobility aid in reach   nonskid shoes/slippers when out of bed   room organization consistent   safety round/check completed  Taken 12/12/2021 1400 by Keerthi Musa RN  Safety Promotion/Fall Prevention:   activity supervised   assistive device/personal items within reach   clutter free environment maintained   fall prevention program maintained   gait belt   lighting adjusted   mobility aid in reach   nonskid shoes/slippers when out of bed   room organization consistent   safety round/check completed  Taken 12/12/2021 1200 by Keerthi Musa RN  Safety Promotion/Fall Prevention:   activity supervised   assistive device/personal items within reach   clutter free environment maintained  Taken 12/12/2021 1000 by Keerthi Musa RN  Safety Promotion/Fall Prevention:   activity supervised   assistive device/personal items within reach   clutter free environment maintained   fall prevention program maintained   gait belt   lighting adjusted   mobility aid in reach   nonskid shoes/slippers when out of bed   room organization consistent   safety round/check completed  Taken 12/12/2021 0800 by Keerthi Musa RN  Safety Promotion/Fall  Prevention:   activity supervised   assistive device/personal items within reach   clutter free environment maintained   fall prevention program maintained   gait belt   lighting adjusted   mobility aid in reach   nonskid shoes/slippers when out of bed   room organization consistent   safety round/check completed  Intervention: Prevent Skin Injury  Recent Flowsheet Documentation  Taken 12/12/2021 1600 by Keerthi Musa RN  Body Position: position changed independently  Taken 12/12/2021 1400 by Keerthi Musa RN  Body Position: position changed independently  Taken 12/12/2021 1200 by Keerthi Musa RN  Body Position: position changed independently  Taken 12/12/2021 1000 by Keerthi Musa RN  Body Position: position changed independently  Taken 12/12/2021 0800 by Keerthi Musa RN  Body Position: position changed independently  Skin Protection: adhesive use limited  Intervention: Prevent and Manage VTE (venous thromboembolism) Risk  Recent Flowsheet Documentation  Taken 12/12/2021 0800 by Keerthi Musa RN  VTE Prevention/Management:   bilateral   dorsiflexion/plantar flexion performed  Intervention: Prevent Infection  Recent Flowsheet Documentation  Taken 12/12/2021 1600 by Keerthi Musa RN  Infection Prevention:   hand hygiene promoted   rest/sleep promoted   single patient room provided  Taken 12/12/2021 1400 by Keerthi Musa RN  Infection Prevention:   hand hygiene promoted   rest/sleep promoted   single patient room provided  Taken 12/12/2021 1200 by Keerthi Musa RN  Infection Prevention:   hand hygiene promoted   rest/sleep promoted   single patient room provided  Taken 12/12/2021 1000 by Keerthi Musa RN  Infection Prevention:   hand hygiene promoted   rest/sleep promoted   single patient room provided  Taken 12/12/2021 0800 by Keerthi Musa RN  Infection Prevention:   hand hygiene promoted   rest/sleep promoted   single patient room provided  Goal: Optimal Comfort and Wellbeing  Outcome: Ongoing,  Progressing  Intervention: Provide Person-Centered Care  Recent Flowsheet Documentation  Taken 12/12/2021 0800 by Keerthi Musa RN  Trust Relationship/Rapport:   care explained   choices provided  Goal: Readiness for Transition of Care  Outcome: Ongoing, Progressing     Problem: Fall Injury Risk  Goal: Absence of Fall and Fall-Related Injury  Outcome: Ongoing, Progressing  Intervention: Identify and Manage Contributors to Fall Injury Risk  Recent Flowsheet Documentation  Taken 12/12/2021 1600 by Keerthi Musa RN  Medication Review/Management: medications reviewed  Taken 12/12/2021 1400 by Keerthi Musa RN  Medication Review/Management:   medications reviewed   dosing adjusted  Taken 12/12/2021 1200 by Keerthi Musa RN  Medication Review/Management:   medications reviewed   dosing adjusted  Taken 12/12/2021 1000 by Keerthi Musa RN  Medication Review/Management:   medications reviewed   dosing adjusted  Taken 12/12/2021 0800 by Keerthi Musa RN  Medication Review/Management:   medications reviewed   dosing adjusted  Intervention: Promote Injury-Free Environment  Recent Flowsheet Documentation  Taken 12/12/2021 1600 by Keerthi Musa RN  Safety Promotion/Fall Prevention:   activity supervised   assistive device/personal items within reach   clutter free environment maintained   fall prevention program maintained   gait belt   lighting adjusted   mobility aid in reach   nonskid shoes/slippers when out of bed   room organization consistent   safety round/check completed  Taken 12/12/2021 1400 by Keerthi Musa RN  Safety Promotion/Fall Prevention:   activity supervised   assistive device/personal items within reach   clutter free environment maintained   fall prevention program maintained   gait belt   lighting adjusted   mobility aid in reach   nonskid shoes/slippers when out of bed   room organization consistent   safety round/check completed  Taken 12/12/2021 1200 by Keerthi Musa RN  Safety Promotion/Fall  Prevention:   activity supervised   assistive device/personal items within reach   clutter free environment maintained  Taken 12/12/2021 1000 by Keerthi Musa RN  Safety Promotion/Fall Prevention:   activity supervised   assistive device/personal items within reach   clutter free environment maintained   fall prevention program maintained   gait belt   lighting adjusted   mobility aid in reach   nonskid shoes/slippers when out of bed   room organization consistent   safety round/check completed  Taken 12/12/2021 0800 by Keerthi Musa RN  Safety Promotion/Fall Prevention:   activity supervised   assistive device/personal items within reach   clutter free environment maintained   fall prevention program maintained   gait belt   lighting adjusted   mobility aid in reach   nonskid shoes/slippers when out of bed   room organization consistent   safety round/check completed     Problem: Pain Acute  Goal: Optimal Pain Control  Outcome: Ongoing, Progressing  Intervention: Optimize Psychosocial Wellbeing  Recent Flowsheet Documentation  Taken 12/12/2021 0800 by Keerthi Musa RN  Diversional Activities: television     Problem: Bleeding (Surgery Nonspecified)  Goal: Absence of Bleeding  Outcome: Ongoing, Progressing     Problem: Bowel Elimination Impaired (Surgery Nonspecified)  Goal: Effective Bowel Elimination  Outcome: Ongoing, Progressing  Intervention: Promote Effective Bowel Elimination  Recent Flowsheet Documentation  Taken 12/12/2021 0800 by Keerthi Musa RN  Bowel Elimination Management: adaptive equipment provided  Bowel Elimination Promotion: adequate fluid intake promoted     Problem: Infection (Surgery Nonspecified)  Goal: Absence of Infection Signs and Symptoms  Outcome: Ongoing, Progressing     Problem: Ongoing Anesthesia Effects (Surgery Nonspecified)  Goal: Anesthesia/Sedation Recovery  Outcome: Ongoing, Progressing  Intervention: Optimize Anesthesia Recovery  Recent Flowsheet Documentation  Taken  12/12/2021 1600 by Keerthi Musa RN  Safety Promotion/Fall Prevention:   activity supervised   assistive device/personal items within reach   clutter free environment maintained   fall prevention program maintained   gait belt   lighting adjusted   mobility aid in reach   nonskid shoes/slippers when out of bed   room organization consistent   safety round/check completed  Taken 12/12/2021 1400 by Keerthi Musa RN  Safety Promotion/Fall Prevention:   activity supervised   assistive device/personal items within reach   clutter free environment maintained   fall prevention program maintained   gait belt   lighting adjusted   mobility aid in reach   nonskid shoes/slippers when out of bed   room organization consistent   safety round/check completed  Taken 12/12/2021 1200 by Keerthi Musa RN  Safety Promotion/Fall Prevention:   activity supervised   assistive device/personal items within reach   clutter free environment maintained  Taken 12/12/2021 1000 by Keerthi Musa RN  Safety Promotion/Fall Prevention:   activity supervised   assistive device/personal items within reach   clutter free environment maintained   fall prevention program maintained   gait belt   lighting adjusted   mobility aid in reach   nonskid shoes/slippers when out of bed   room organization consistent   safety round/check completed  Taken 12/12/2021 0800 by Keerthi Musa RN  Safety Promotion/Fall Prevention:   activity supervised   assistive device/personal items within reach   clutter free environment maintained   fall prevention program maintained   gait belt   lighting adjusted   mobility aid in reach   nonskid shoes/slippers when out of bed   room organization consistent   safety round/check completed     Problem: Pain (Surgery Nonspecified)  Goal: Acceptable Pain Control  Outcome: Ongoing, Progressing  Intervention: Prevent or Manage Pain  Recent Flowsheet Documentation  Taken 12/12/2021 0800 by Keerthi Musa RN  Diversional Activities:  television     Problem: Postoperative Nausea and Vomiting (Surgery Nonspecified)  Goal: Nausea and Vomiting Relief  Outcome: Ongoing, Progressing     Problem: Postoperative Urinary Retention (Surgery Nonspecified)  Goal: Effective Urinary Elimination  Outcome: Ongoing, Progressing     Problem: Skin Injury Risk Increased  Goal: Skin Health and Integrity  Outcome: Ongoing, Progressing  Intervention: Optimize Skin Protection  Recent Flowsheet Documentation  Taken 12/12/2021 1600 by Keerthi Musa RN  Head of Bed (HOB): HOB elevated  Taken 12/12/2021 1400 by Keerthi Musa RN  Head of Bed (HOB): HOB elevated  Taken 12/12/2021 1200 by Keerthi Musa RN  Head of Bed (HOB): HOB elevated  Taken 12/12/2021 1000 by Keerthi Musa RN  Head of Bed (HOB): HOB elevated  Taken 12/12/2021 0800 by Keerthi Musa RN  Head of Bed (HOB): HOB elevated  Skin Protection: adhesive use limited   Goal Outcome Evaluation:  Plan of Care Reviewed With: patient        Progress: improving  Outcome Summary: VSS on 2 L NC. No complants of pain or nausea this shift. JANA in place. Will continue plan of care.

## 2021-12-12 NOTE — PLAN OF CARE
Goal Outcome Evaluation:           Progress: improving  Outcome Summary: VSS on 2L O2. No complaints of pain or nausea this shift. JANA in place, dressing changed due to drainage. Will continue plan of care. 0300  12/12/2021

## 2021-12-13 ENCOUNTER — TELEPHONE (OUTPATIENT)
Dept: ONCOLOGY | Facility: CLINIC | Age: 86
End: 2021-12-13

## 2021-12-13 VITALS
RESPIRATION RATE: 18 BRPM | BODY MASS INDEX: 31.5 KG/M2 | HEART RATE: 75 BPM | SYSTOLIC BLOOD PRESSURE: 131 MMHG | DIASTOLIC BLOOD PRESSURE: 68 MMHG | TEMPERATURE: 97.7 F | HEIGHT: 66 IN | WEIGHT: 196 LBS | OXYGEN SATURATION: 94 %

## 2021-12-13 LAB
GLUCOSE BLDC GLUCOMTR-MCNC: 183 MG/DL (ref 70–130)
GLUCOSE BLDC GLUCOMTR-MCNC: 187 MG/DL (ref 70–130)

## 2021-12-13 PROCEDURE — 94799 UNLISTED PULMONARY SVC/PX: CPT

## 2021-12-13 PROCEDURE — 63710000001 INSULIN LISPRO (HUMAN) PER 5 UNITS: Performed by: INTERNAL MEDICINE

## 2021-12-13 PROCEDURE — 94660 CPAP INITIATION&MGMT: CPT

## 2021-12-13 PROCEDURE — 99223 1ST HOSP IP/OBS HIGH 75: CPT | Performed by: INTERNAL MEDICINE

## 2021-12-13 PROCEDURE — 63710000001 INSULIN DETEMIR PER 5 UNITS: Performed by: INTERNAL MEDICINE

## 2021-12-13 PROCEDURE — 82962 GLUCOSE BLOOD TEST: CPT

## 2021-12-13 RX ADMIN — INSULIN DETEMIR 20 UNITS: 100 INJECTION, SOLUTION SUBCUTANEOUS at 08:29

## 2021-12-13 RX ADMIN — DOCUSATE SODIUM 100 MG: 100 CAPSULE, LIQUID FILLED ORAL at 03:59

## 2021-12-13 RX ADMIN — PANTOPRAZOLE SODIUM 40 MG: 40 TABLET, DELAYED RELEASE ORAL at 03:58

## 2021-12-13 RX ADMIN — METOPROLOL SUCCINATE 100 MG: 100 TABLET, EXTENDED RELEASE ORAL at 08:27

## 2021-12-13 RX ADMIN — APIXABAN 5 MG: 5 TABLET, FILM COATED ORAL at 08:27

## 2021-12-13 RX ADMIN — FLUTICASONE PROPIONATE 2 SPRAY: 50 SPRAY, METERED NASAL at 08:29

## 2021-12-13 RX ADMIN — ALLOPURINOL 300 MG: 300 TABLET ORAL at 08:27

## 2021-12-13 RX ADMIN — TRIAMTERENE AND HYDROCHLOROTHIAZIDE 1 TABLET: 37.5; 25 TABLET ORAL at 08:27

## 2021-12-13 RX ADMIN — AMLODIPINE BESYLATE 5 MG: 5 TABLET ORAL at 08:27

## 2021-12-13 RX ADMIN — INSULIN LISPRO 5 UNITS: 100 INJECTION, SOLUTION INTRAVENOUS; SUBCUTANEOUS at 12:02

## 2021-12-13 RX ADMIN — INSULIN LISPRO 2 UNITS: 100 INJECTION, SOLUTION INTRAVENOUS; SUBCUTANEOUS at 12:02

## 2021-12-13 RX ADMIN — BISACODYL 10 MG: 5 TABLET, COATED ORAL at 03:58

## 2021-12-13 RX ADMIN — INSULIN LISPRO 5 UNITS: 100 INJECTION, SOLUTION INTRAVENOUS; SUBCUTANEOUS at 08:29

## 2021-12-13 RX ADMIN — INSULIN LISPRO 2 UNITS: 100 INJECTION, SOLUTION INTRAVENOUS; SUBCUTANEOUS at 08:27

## 2021-12-13 NOTE — CASE MANAGEMENT/SOCIAL WORK
Case Management Discharge Note      Final Note: CM spoke with pt at bedside. Pt now plans to return home and requested home health services for nursing only for wound checks. Pt denies need for pt/ot services. Pt ambualted with therapy on 12/10 and ambulated 400ft with RW. Pt reports he has a RW at home. Pt plans to return home with his daughter Susana and denies additional discharge needs. CM spoke with Bella at [facility] and they are full however [facility] can accept pt and see him on Wedensday. Pt will have private transportation home.         Selected Continued Care - Admitted Since 12/7/2021         Home Medical Care     Service Provider Selected Services Address Phone Fax Patient Preferred    [facility]  [facility] [address], [place] [id number] [phone] --                       Final Discharge Disposition Code: 06 - home with home health care

## 2021-12-13 NOTE — TELEPHONE ENCOUNTER
----- Message from Chloe Miller sent at 12/9/2021  4:05 PM EST -----  Regarding: RE: FMLA PPWK FOR PT DAUGHTER  Good afternoon all,   Pt daughter dropped off FMLA ppwk that needs to be filled out by Dr. Ramon staff. Pt would like for this to be mailed and would like a call once this has been completed. FMLA is regarding pt daughter Susana Dietz. Ppwk left in MA bin for Dr. Ramon.   Thanks,  Chloe CAMPOVERDE

## 2021-12-13 NOTE — PLAN OF CARE
Problem: Adult Inpatient Plan of Care  Goal: Plan of Care Review  Outcome: Ongoing, Progressing  Flowsheets (Taken 12/13/2021 1447)  Progress: improving  Plan of Care Reviewed With: patient  Outcome Summary: VSS. No complaints at this time. JANA drain removed per Dr. Villafuerte. Pt awaiting discharge.  Goal: Patient-Specific Goal (Individualized)  Outcome: Ongoing, Progressing  Goal: Absence of Hospital-Acquired Illness or Injury  Outcome: Ongoing, Progressing  Intervention: Identify and Manage Fall Risk  Recent Flowsheet Documentation  Taken 12/13/2021 1200 by Keerthi Musa RN  Safety Promotion/Fall Prevention:   activity supervised   assistive device/personal items within reach   clutter free environment maintained   fall prevention program maintained   gait belt   lighting adjusted   mobility aid in reach   nonskid shoes/slippers when out of bed   room organization consistent   safety round/check completed  Taken 12/13/2021 1000 by Keerthi Musa RN  Safety Promotion/Fall Prevention:   activity supervised   assistive device/personal items within reach   clutter free environment maintained   fall prevention program maintained   gait belt   lighting adjusted   mobility aid in reach   nonskid shoes/slippers when out of bed   room organization consistent   safety round/check completed  Taken 12/13/2021 0800 by Keerthi Musa RN  Safety Promotion/Fall Prevention:   activity supervised   assistive device/personal items within reach   clutter free environment maintained   fall prevention program maintained   gait belt   lighting adjusted   mobility aid in reach   nonskid shoes/slippers when out of bed   room organization consistent   safety round/check completed  Intervention: Prevent Skin Injury  Recent Flowsheet Documentation  Taken 12/13/2021 1200 by Keerthi Musa RN  Body Position: position changed independently  Taken 12/13/2021 1000 by Keerthi Musa RN  Body Position: position changed independently  Taken  12/13/2021 0800 by Keerthi Musa RN  Body Position: position changed independently  Intervention: Prevent and Manage VTE (venous thromboembolism) Risk  Recent Flowsheet Documentation  Taken 12/13/2021 0800 by Keerthi Musa RN  VTE Prevention/Management:   bilateral   dorsiflexion/plantar flexion performed  Intervention: Prevent Infection  Recent Flowsheet Documentation  Taken 12/13/2021 1200 by Keerthi Musa RN  Infection Prevention:   hand hygiene promoted   rest/sleep promoted   single patient room provided  Taken 12/13/2021 1000 by Keerthi Musa RN  Infection Prevention:   hand hygiene promoted   rest/sleep promoted   single patient room provided  Taken 12/13/2021 0800 by Keerthi Musa RN  Infection Prevention:   hand hygiene promoted   rest/sleep promoted   single patient room provided  Goal: Optimal Comfort and Wellbeing  Outcome: Ongoing, Progressing  Intervention: Provide Person-Centered Care  Recent Flowsheet Documentation  Taken 12/13/2021 0800 by Keerthi Musa RN  Trust Relationship/Rapport:   care explained   choices provided  Goal: Readiness for Transition of Care  Outcome: Ongoing, Progressing     Problem: Fall Injury Risk  Goal: Absence of Fall and Fall-Related Injury  Outcome: Ongoing, Progressing  Intervention: Identify and Manage Contributors to Fall Injury Risk  Recent Flowsheet Documentation  Taken 12/13/2021 1000 by Keerthi uMsa RN  Medication Review/Management:   medications reviewed   dosing adjusted  Taken 12/13/2021 0800 by Keerthi Musa RN  Medication Review/Management: medications reviewed  Intervention: Promote Injury-Free Environment  Recent Flowsheet Documentation  Taken 12/13/2021 1200 by Keerthi Musa RN  Safety Promotion/Fall Prevention:   activity supervised   assistive device/personal items within reach   clutter free environment maintained   fall prevention program maintained   gait belt   lighting adjusted   mobility aid in reach   nonskid shoes/slippers when out of  bed   room organization consistent   safety round/check completed  Taken 12/13/2021 1000 by Keerthi Musa RN  Safety Promotion/Fall Prevention:   activity supervised   assistive device/personal items within reach   clutter free environment maintained   fall prevention program maintained   gait belt   lighting adjusted   mobility aid in reach   nonskid shoes/slippers when out of bed   room organization consistent   safety round/check completed  Taken 12/13/2021 0800 by Keerthi Musa RN  Safety Promotion/Fall Prevention:   activity supervised   assistive device/personal items within reach   clutter free environment maintained   fall prevention program maintained   gait belt   lighting adjusted   mobility aid in reach   nonskid shoes/slippers when out of bed   room organization consistent   safety round/check completed     Problem: Pain Acute  Goal: Optimal Pain Control  Outcome: Ongoing, Progressing  Intervention: Optimize Psychosocial Wellbeing  Recent Flowsheet Documentation  Taken 12/13/2021 0800 by Keerthi Musa RN  Diversional Activities: television     Problem: Bleeding (Surgery Nonspecified)  Goal: Absence of Bleeding  Outcome: Ongoing, Progressing     Problem: Bowel Elimination Impaired (Surgery Nonspecified)  Goal: Effective Bowel Elimination  Outcome: Ongoing, Progressing     Problem: Infection (Surgery Nonspecified)  Goal: Absence of Infection Signs and Symptoms  Outcome: Ongoing, Progressing     Problem: Ongoing Anesthesia Effects (Surgery Nonspecified)  Goal: Anesthesia/Sedation Recovery  Outcome: Ongoing, Progressing  Intervention: Optimize Anesthesia Recovery  Recent Flowsheet Documentation  Taken 12/13/2021 1200 by Keerthi Musa RN  Safety Promotion/Fall Prevention:   activity supervised   assistive device/personal items within reach   clutter free environment maintained   fall prevention program maintained   gait belt   lighting adjusted   mobility aid in reach   nonskid shoes/slippers when out of  bed   room organization consistent   safety round/check completed  Taken 12/13/2021 1000 by Keerthi Musa RN  Safety Promotion/Fall Prevention:   activity supervised   assistive device/personal items within reach   clutter free environment maintained   fall prevention program maintained   gait belt   lighting adjusted   mobility aid in reach   nonskid shoes/slippers when out of bed   room organization consistent   safety round/check completed  Taken 12/13/2021 0800 by Keerthi Musa RN  Safety Promotion/Fall Prevention:   activity supervised   assistive device/personal items within reach   clutter free environment maintained   fall prevention program maintained   gait belt   lighting adjusted   mobility aid in reach   nonskid shoes/slippers when out of bed   room organization consistent   safety round/check completed     Problem: Pain (Surgery Nonspecified)  Goal: Acceptable Pain Control  Outcome: Ongoing, Progressing  Intervention: Prevent or Manage Pain  Recent Flowsheet Documentation  Taken 12/13/2021 0800 by Keerthi Musa RN  Diversional Activities: television     Problem: Postoperative Nausea and Vomiting (Surgery Nonspecified)  Goal: Nausea and Vomiting Relief  Outcome: Ongoing, Progressing     Problem: Postoperative Urinary Retention (Surgery Nonspecified)  Goal: Effective Urinary Elimination  Outcome: Ongoing, Progressing     Problem: Skin Injury Risk Increased  Goal: Skin Health and Integrity  Outcome: Ongoing, Progressing  Intervention: Optimize Skin Protection  Recent Flowsheet Documentation  Taken 12/13/2021 1200 by Keerthi Musa RN  Head of Bed (HOB): HOB elevated  Taken 12/13/2021 1000 by Keerthi Musa RN  Head of Bed (HOB): HOB elevated  Taken 12/13/2021 0800 by Keerthi Musa RN  Head of Bed (HOB): HOB elevated   Goal Outcome Evaluation:  Plan of Care Reviewed With: patient        Progress: improving  Outcome Summary: VSS. No complaints at this time. JANA drain removed per Dr. Villafuerte. Pt awaiting  discharge.

## 2021-12-13 NOTE — PLAN OF CARE
Goal Outcome Evaluation:  Plan of Care Reviewed With: patient        Progress: improving  Outcome Summary: VSS on 2L per NC.  CPAP at HS.  Reports managed pain levels.  JANA with 130cc serosang output.  All dressings remain CDI.  Pt voices no new complaints/concerns at this time.

## 2021-12-13 NOTE — CONSULTS
Subjective     CHIEF COMPLAINT: Schedule admission for right kidney mass resection    HISTORY OF PRESENT ILLNESS:  The patient is a 86 y.o. 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  The patient presented with bilateral PEs and incidentally on CT scan there was a mass invading the right renal pelvis.  This was watched and repeat scans revealed progressive disease.  The patient agreed to proceed with surgery.  The patient had right nephrectomy done by Dr. Chu on December 7, 2021.  Final pathology confirmed diffuse large B-cell lymphoma with high proliferation rate.  I was consulted to further assist in his care.  When I saw the patient today he is sitting, bedside chair.  He is very happy with the final pathology.  He is recovering well from his surgery.      REVIEW OF SYSTEMS:  A 14 point review of systems was performed and is negative except as noted above.    Past Medical History:   Diagnosis Date   • Aortic stenosis     status post ROSS procedure, remote, with December 2015 echocardiography revealing normal aortic and pulmonary valve function, normal ejection fraction and mild to moderate MR   • Arthritis    • Bilateral impacted cerumen 11/23/2016   • Bronchitis    • Chronic gout of right foot 6/13/2016    Impression: 03/08/2016 - stable.;    • COVID-19 vaccine series completed 05/12/2021    Maderna 2nd dose 3/12/21.   • Depression    • Diabetes mellitus (HCC)    • Diverticulitis    • Exogenous obesity    • Gout    • Heart murmur    • History of vitamin D deficiency    • Hypercholesteremia 8/11/2016   • Hyperlipidemia    • Hypertension    • Kidney lesion    • Malignant neoplasm of prostate (HCC)    • Morbid obesity (HCC) 8/11/2016   • Pneumonia 05/12/2021   • Primary osteoarthritis involving multiple joints 6/13/2016    Impression: 03/08/2016 - stable;    • Pulmonary embolism (HCC)    • Sleep apnea 05/12/2021    C-Pap   • Uncontrolled type 2  diabetes mellitus with hyperglycemia ([facility]) 6/13/2016    Impression: 03/08/2016 - seeing Endo .;    • Vertigo        No current facility-administered medications on file prior to encounter.     Current Outpatient Medications on File Prior to Encounter   Medication Sig Dispense Refill   • allopurinol (ZYLOPRIM) 300 MG tablet TAKE 1 TABLET BY MOUTH ONE TIME A DAY  (Patient taking differently: Take 300 mg by mouth Daily.) 90 tablet 0   • amLODIPine (NORVASC) 5 MG tablet Take 1 tablet by mouth Daily. 30 tablet 1   • Apalutamide 60 MG tablet Take 4 tablets by mouth once daily with or without food. Do not crush or chew tablets. (Patient taking differently: Take 240 mg by mouth Daily. NOT SURE IF HE IS TAKING) 120 tablet 3   • triamterene-hydrochlorothiazide (MAXZIDE) 75-50 MG per tablet Take 1 tablet by mouth Daily.     • Cholecalciferol (VITAMIN D3) 2000 UNITS capsule Take  by mouth 2 (two) times a day.     • Cinnamon 500 MG tablet Take 2,000 tablets by mouth Daily.     • Coenzyme Q10 100 MG tablet Take 2 tablets by mouth Daily.     • Dulaglutide (Trulicity) 1.5 MG/0.5ML solution pen-injector Inject 0.75 mg under the skin into the appropriate area as directed 1 (One) Time Per Week. 6 mL 0   • fenofibric acid (TRILIPIX) 135 MG capsule delayed-release delayed release capsule TAKE 1 CAPSULE BY MOUTH ONE TIME A DAY  (Patient taking differently: Take 135 mg by mouth Daily.) 30 capsule 2   • fluticasone (FLONASE) 50 MCG/ACT nasal spray 2 sprays into the nostril(s) as directed by provider Daily. 2 puffs each nostril (Patient taking differently: 2 sprays into the nostril(s) as directed by provider Daily As Needed for Rhinitis or Allergies. 2 puffs each nostril) 1 bottle 0   • Jardiance 10 MG tablet TAKE 1 TABLET BY MOUTH EVERY DAY  (Patient taking differently: Take 10 mg by mouth Daily.) 30 tablet 3   • l-methylfolate-algae-B6-B12 (METANX) 3-90.314-2-35 MG capsule capsule TAKE 1 CAPSULE BY MOUTH TWICE A DAY. (Patient taking  differently: Take 1 capsule by mouth 2 (Two) Times a Day.) 180 capsule 1   • Lantus 100 UNIT/ML injection Up to 50 units daily (Patient taking differently: Inject 45 Units under the skin into the appropriate area as directed Every Night. Up to 50 units daily) 20 mL 5   • LECITHIN PO Take 1,200 mg by mouth Daily.     • leuprolide (LUPRON) 22.5 MG injection Inject 22.5 mg into the appropriate muscle as directed by prescriber Every 3 (Three) Months.     • losartan-hydrochlorothiazide (HYZAAR) 100-25 MG per tablet Take 1 tablet by mouth Daily.     • metFORMIN ER (GLUCOPHAGE-XR) 500 MG 24 hr tablet Take 1 tablet by mouth 2 (Two) Times a Day. (Patient taking differently: Take 500 mg by mouth Daily With Breakfast.) 60 tablet 5   • methocarbamol (ROBAXIN) 500 MG tablet Take 1 tablet by mouth 3 (Three) Times a Day As Needed for Muscle Spasms. 30 tablet 0   • metoprolol succinate XL (TOPROL-XL) 100 MG 24 hr tablet TAKE 1 TABLET BY MOUTH EVERY DAY  (Patient taking differently: Take 100 mg by mouth Daily.) 90 tablet 0   • ondansetron (ZOFRAN) 8 MG tablet Take 1 tablet by mouth 3 (Three) Times a Day As Needed for Nausea or Vomiting. 30 tablet 5   • pravastatin (PRAVACHOL) 40 MG tablet TAKE ONE TABLET BY MOUTH EVERY NIGHT AT BEDTIME (Patient taking differently: Take 40 mg by mouth Every Night.) 90 tablet 0       Allergies   Allergen Reactions   • Ampicillin Anaphylaxis   • Medrol [Methylprednisolone] Unknown - High Severity     Made his blood sugar get really high, can't take this   • Myrbetriq [Mirabegron] Unknown - High Severity     High BP   • Penicillins Anaphylaxis   • Bee Venom Swelling       Past Surgical History:   Procedure Laterality Date   • CARDIAC VALVE REPLACEMENT  05/12/2021    Ross procedure 22 years ago   • COLON SURGERY     • COLONOSCOPY     • COLOSTOMY  1999   • COLOSTOMY REVISION     • CYSTOSCOPY N/A 12/7/2021    Procedure: CYSTOSCOPY FLEXIBLE;  Surgeon: José Miguel Villafuerte Jr., MD;  Location: [facility] OR;   Service: Urology;  Laterality: N/A;   • EXPLORATORY LAPAROTOMY      With Tissue Removal   • LIPOMA EXCISION     • MOHS SURGERY     • NEPHROURETERECTOMY Right 12/7/2021    Procedure: RIGHT NEPHROURETERECTOMY LAPAROSCOPIC WITH DAVINCI ROBOT;  Surgeon: José Miguel Villafuerte Jr., MD;  Location: [facility];  Service: Robotics - DaVinci;  Laterality: Right;   • OTHER SURGICAL HISTORY      Porcine Valve   • PROSTATE SURGERY     • PROSTATECTOMY  2000     History of Prostatectomy Perineal Radical   • SKIN CANCER EXCISION      from head and lip   • TONSILLECTOMY         OB History   No obstetric history on file.       Social History     Socioeconomic History   • Marital status: Single   Tobacco Use   • Smoking status: Never Smoker   • Smokeless tobacco: Never Used   Vaping Use   • Vaping Use: Never used   Substance and Sexual Activity   • Alcohol use: No   • Drug use: No   • Sexual activity: Defer       Family History   Problem Relation Age of Onset   • Diabetes Mother    • Lung cancer Mother    • Cancer Mother    • Heart disease Father    • Breast cancer Other    • Cancer Other    • Hypertension Other    • Migraines Other    • Obesity Other    • Diabetes Other        Objective     Vitals:    12/13/21 1000 12/13/21 1118 12/13/21 1200 12/13/21 1400   BP:  131/68     BP Location:  Right arm     Patient Position:  Sitting     Pulse: 80 69 74 75   Resp:  18     Temp:  97.7 °F (36.5 °C)     TempSrc:  Oral     SpO2: 96% 91% 94% 94%   Weight:       Height:                            ECOG Performance Status: 2 - Symptomatic, <50% confined to bed  General: well appearing male in no acute distress  Neuro/Psych: A&O x 3, gait steady, appropriate affect, strength 5/5 in all muscle groups  HEENT: sclerae anicteric, oropharynx clear  Lymphatics: no cervical, supraclavicular, or axillary adenopathy  Cardiovascular: regular rate and rhythm, no murmurs  Lungs: clear to auscultation bilaterally  Abdomen: soft, nontender, nondistended.  No  palpable organomegaly  Extremities: no lower extremity edema  Skin: no rashes, lesions, bruising, or petechiae      Admission on 12/07/2021   Component Date Value Ref Range Status   • Glucose 12/07/2021 138* 70 - 130 mg/dL Final    Meter: [id number] : [id number] Abiodun Anne   • Case Report 12/07/2021    Final                    Value:Surgical Pathology Report                         Case: [id number]                                  Authorizing Provider:  José Miguel Villafuerte Jr., Collected:           12/07/2021 10:53 AM                                 MD                                                                           Ordering Location:     [facility]   Received:            12/07/2021 02:04 PM                                 OR                                                                           Pathologist:           Brandt Navas MD                                                        Specimens:   1) - Kidney, Right, right adrenal gland and ureter                                                  2) - Ureter, Right, distal margin is opposite clip                                        • Clinical Information 12/07/2021    Final                    Value:This result contains rich text formatting which cannot be displayed here.   • Final Diagnosis 12/07/2021    Final                    Value:This result contains rich text formatting which cannot be displayed here.   • Comment 12/07/2021    Final                    Value:This result contains rich text formatting which cannot be displayed here.   • Gross Description 12/07/2021    Final                    Value:This result contains rich text formatting which cannot be displayed here.   • Microscopic Description 12/07/2021    Final                    Value:This result contains rich text formatting which cannot be displayed here.   • WBC 12/07/2021 11.58* 3.40 - 10.80 10*3/mm3 Final   • RBC 12/07/2021 4.52  4.14 - 5.80 10*6/mm3  Final   • Hemoglobin 12/07/2021 13.0  13.0 - 17.7 g/dL Final   • Hematocrit 12/07/2021 41.3  37.5 - 51.0 % Final   • MCV 12/07/2021 91.4  79.0 - 97.0 fL Final   • MCH 12/07/2021 28.8  26.6 - 33.0 pg Final   • MCHC 12/07/2021 31.5  31.5 - 35.7 g/dL Final   • RDW 12/07/2021 13.6  12.3 - 15.4 % Final   • RDW-SD 12/07/2021 45.8  37.0 - 54.0 fl Final   • MPV 12/07/2021 10.5  6.0 - 12.0 fL Final   • Platelets 12/07/2021 155  140 - 450 10*3/mm3 Final   • Glucose 12/07/2021 195* 65 - 99 mg/dL Final   • BUN 12/07/2021 21  8 - 23 mg/dL Final   • Creatinine 12/07/2021 1.20  0.76 - 1.27 mg/dL Final   • Sodium 12/07/2021 141  136 - 145 mmol/L Final   • Potassium 12/07/2021 3.8  3.5 - 5.2 mmol/L Final   • Chloride 12/07/2021 102  98 - 107 mmol/L Final   • CO2 12/07/2021 28.0  22.0 - 29.0 mmol/L Final   • Calcium 12/07/2021 8.6  8.6 - 10.5 mg/dL Final   • eGFR Non African Amer 12/07/2021 58* >60 mL/min/1.73 Final   • BUN/Creatinine Ratio 12/07/2021 17.5  7.0 - 25.0 Final   • Anion Gap 12/07/2021 11.0  5.0 - 15.0 mmol/L Final   • Glucose 12/07/2021 201* 70 - 130 mg/dL Final    Meter: [id number] : [id number] Kenny MCKEON   • Glucose 12/07/2021 236* 70 - 130 mg/dL Final    Meter: [id number] : [id number] Linda Atkinson   • Glucose 12/07/2021 224* 70 - 130 mg/dL Final    Meter: [id number] : [id number] Frankie Gottlieb   • Glucose 12/07/2021 275* 70 - 130 mg/dL Final    Meter: [id number] : [id number] Jose Ramon Meza   • WBC 12/08/2021 16.57* 3.40 - 10.80 10*3/mm3 Final   • RBC 12/08/2021 4.20  4.14 - 5.80 10*6/mm3 Final   • Hemoglobin 12/08/2021 12.4* 13.0 - 17.7 g/dL Final   • Hematocrit 12/08/2021 38.6  37.5 - 51.0 % Final   • MCV 12/08/2021 91.9  79.0 - 97.0 fL Final   • MCH 12/08/2021 29.5  26.6 - 33.0 pg Final   • MCHC 12/08/2021 32.1  31.5 - 35.7 g/dL Final   • RDW 12/08/2021 13.8  12.3 - 15.4 % Final   • RDW-SD 12/08/2021 46.9  37.0 - 54.0 fl Final   • MPV 12/08/2021 10.6  6.0 - 12.0 fL Final   • Platelets  12/08/2021 161  140 - 450 10*3/mm3 Final   • Glucose 12/08/2021 140* 65 - 99 mg/dL Final   • BUN 12/08/2021 22  8 - 23 mg/dL Final   • Creatinine 12/08/2021 1.36* 0.76 - 1.27 mg/dL Final   • Sodium 12/08/2021 143  136 - 145 mmol/L Final   • Potassium 12/08/2021 4.3  3.5 - 5.2 mmol/L Final   • Chloride 12/08/2021 107  98 - 107 mmol/L Final   • CO2 12/08/2021 27.0  22.0 - 29.0 mmol/L Final   • Calcium 12/08/2021 8.2* 8.6 - 10.5 mg/dL Final   • eGFR Non African Amer 12/08/2021 50* >60 mL/min/1.73 Final   • BUN/Creatinine Ratio 12/08/2021 16.2  7.0 - 25.0 Final   • Anion Gap 12/08/2021 9.0  5.0 - 15.0 mmol/L Final   • Glucose 12/08/2021 161* 70 - 130 mg/dL Final    Meter: [id number] : [id number] Cee Manuel   • Glucose 12/08/2021 118  70 - 130 mg/dL Final    Meter: [id number] : [id number] Cee Manuel   • Glucose 12/08/2021 86  70 - 130 mg/dL Final    Meter: [id number] : [id number] Frankie Gottlieb   • Glucose 12/08/2021 138* 70 - 130 mg/dL Final    Meter: [id number] : [id number] Jose Ramon Meza   • Glucose 12/09/2021 107* 65 - 99 mg/dL Final   • BUN 12/09/2021 23  8 - 23 mg/dL Final   • Creatinine 12/09/2021 1.25  0.76 - 1.27 mg/dL Final   • Sodium 12/09/2021 135* 136 - 145 mmol/L Final   • Potassium 12/09/2021 4.1  3.5 - 5.2 mmol/L Final   • Chloride 12/09/2021 103  98 - 107 mmol/L Final   • CO2 12/09/2021 24.0  22.0 - 29.0 mmol/L Final   • Calcium 12/09/2021 7.8* 8.6 - 10.5 mg/dL Final   • eGFR Non African Amer 12/09/2021 55* >60 mL/min/1.73 Final   • BUN/Creatinine Ratio 12/09/2021 18.4  7.0 - 25.0 Final   • Anion Gap 12/09/2021 8.0  5.0 - 15.0 mmol/L Final   • Glucose 12/09/2021 181* 70 - 130 mg/dL Final    Meter: [id number] : [id number] Campbell   • Creatinine, Fluid 12/09/2021 1.2  mg/dL Final   • Glucose 12/09/2021 136* 70 - 130 mg/dL Final    Meter: [id number] : [id number] Campbell   • Glucose 12/09/2021 116  70 - 130 mg/dL Final    Meter: [id number] :  [id number] Aung Campbell   • Glucose 12/09/2021 116  70 - 130 mg/dL Final    Meter: [id number] : [id number] Gideon Mirza   • Glucose 12/10/2021 82  70 - 130 mg/dL Final    Meter: [id number] : [id number] iLsy Chang   • Glucose 12/10/2021 212* 65 - 99 mg/dL Final   • BUN 12/10/2021 26* 8 - 23 mg/dL Final   • Creatinine 12/10/2021 1.22  0.76 - 1.27 mg/dL Final   • Sodium 12/10/2021 137  136 - 145 mmol/L Final   • Potassium 12/10/2021 4.7  3.5 - 5.2 mmol/L Final    Slight hemolysis detected by analyzer. Results may be affected.   • Chloride 12/10/2021 105  98 - 107 mmol/L Final   • CO2 12/10/2021 24.0  22.0 - 29.0 mmol/L Final   • Calcium 12/10/2021 8.2* 8.6 - 10.5 mg/dL Final   • eGFR Non African Amer 12/10/2021 57* >60 mL/min/1.73 Final   • BUN/Creatinine Ratio 12/10/2021 21.3  7.0 - 25.0 Final   • Anion Gap 12/10/2021 8.0  5.0 - 15.0 mmol/L Final   • WBC 12/10/2021 14.45* 3.40 - 10.80 10*3/mm3 Final   • RBC 12/10/2021 4.37  4.14 - 5.80 10*6/mm3 Final   • Hemoglobin 12/10/2021 12.7* 13.0 - 17.7 g/dL Final   • Hematocrit 12/10/2021 39.5  37.5 - 51.0 % Final   • MCV 12/10/2021 90.4  79.0 - 97.0 fL Final   • MCH 12/10/2021 29.1  26.6 - 33.0 pg Final   • MCHC 12/10/2021 32.2  31.5 - 35.7 g/dL Final   • RDW 12/10/2021 13.9  12.3 - 15.4 % Final   • RDW-SD 12/10/2021 45.8  37.0 - 54.0 fl Final   • MPV 12/10/2021 10.6  6.0 - 12.0 fL Final   • Platelets 12/10/2021 171  140 - 450 10*3/mm3 Final   • Glucose 12/10/2021 203* 70 - 130 mg/dL Final    Meter: [id number] : [id number] Lisy Chang   • Glucose 12/10/2021 77  70 - 130 mg/dL Final    Meter: [id number] : [id number] Lisy Chang   • Magnesium 12/10/2021 2.2  1.6 - 2.4 mg/dL Final   • Glucose 12/10/2021 149* 70 - 130 mg/dL Final    Meter: [id number] : [id number] Susana Gordon   • Glucose 12/11/2021 119  70 - 130 mg/dL Final    Meter: [id number] : [id number] Jarett Duron   • Glucose 12/11/2021 174* 70 - 130 mg/dL Final    Meter:  [id number] : [id number] Jarett Majore   • Glucose 12/11/2021 246* 70 - 130 mg/dL Final    Meter: [id number] : [id number] Jarett Rdzylee   • Glucose 12/11/2021 237* 70 - 130 mg/dL Final    Meter: [id number] : [id number] Ruby Wren   • Glucose 12/12/2021 128  70 - 130 mg/dL Final    Meter: [id number] : [id number] David Chery   • Glucose 12/12/2021 190* 70 - 130 mg/dL Final    Meter: [id number] : [id number] Cee Manuel   • Glucose 12/12/2021 186* 70 - 130 mg/dL Final    Meter: [id number] : [id number] Aung Campbell   • Glucose 12/12/2021 292* 70 - 130 mg/dL Final    Meter: [id number] : [id number] Minerva Sera   • Glucose 12/13/2021 183* 70 - 130 mg/dL Final    Meter: [id number] : [id number] David Chery   • Glucose 12/13/2021 187* 70 - 130 mg/dL Final    Meter: [id number] : [id number] David Chery        No results found.    ASSESSMENT 86 is a gentleman with diffuse large B-cell lymphoma    PROBLEM LIST   1.  Diffuse large B-cell lymphoma right kidney stage Ia:  A.  Presented with a growing mass invading the right renal pelvis  B.  Status post right nephrectomy done December 7, 2021  C.  High perforation rate.  BCL2, BCL6, and c-Myc by FISH are pending  2.  Metastatic prostate cancer  3.  Congestive heart failure: Ejection fraction 45% back in March 2021  4.  Postoperative anemia  5.  Bilateral PEs: On Eliquis twice a day.    PLAN  1.  I will go over the final pathology report in details with the patient.  I explained to him that this has has a better prognosis compared to transitional cell carcinoma or clear cell carcinoma.  2.  I discussed the case with Dr. Navas from pathology and Dr. Chu neurology over the phone today to coordinate patient care.  3.  The patient will follow with me in 2 weeks [place] office.  4.  I will arrange for cardiac echo and PICC line placement as an outpatient per  5.  The patient will benefit from chemotherapy I would like to do R-CHOP  chemotherapy but we have to wait for his ejection fraction we may have to adjust his anthracycline dose.  6.  The patient would benefit from intrathecal prophylaxis given the location of his lymphoma.    Shannan Ramon MD    12/13/2021

## 2021-12-13 NOTE — TELEPHONE ENCOUNTER
Received [facility] paperwork for pt daughter Susana on 12/9/21. BRENT signed and form fee waived. Susana would like completed paperwork mailed. I placed forms in mail to go out today.

## 2021-12-14 ENCOUNTER — READMISSION MANAGEMENT (OUTPATIENT)
Dept: CALL CENTER | Facility: HOSPITAL | Age: 86
End: 2021-12-14

## 2021-12-14 NOTE — OUTREACH NOTE
Prep Survey      Responses   Religious facility patient discharged from? [place]   Is LACE score < 7 ? No   Emergency Room discharge w/ pulse ox? No   Eligibility Readm Mgmt   Discharge diagnosis s/p right nepheroureterectomy and adrenalectomy    Does the patient have one of the following disease processes/diagnoses(primary or secondary)? General Surgery   Does the patient have Home health ordered? Yes   What is the Home health agency?  [facility]   Is there a DME ordered? No   Comments regarding appointments Listed   Prep survey completed? Yes          Odette Pat RN

## 2021-12-16 LAB
CYTO UR: NORMAL
LAB AP CASE REPORT: NORMAL
LAB AP CLINICAL INFORMATION: NORMAL
LAB AP DIAGNOSIS COMMENT: NORMAL
LAB AP INTEGRATED ONCOLOGY, ADDENDUM: NORMAL
PATH REPORT.FINAL DX SPEC: NORMAL
PATH REPORT.GROSS SPEC: NORMAL

## 2021-12-23 ENCOUNTER — READMISSION MANAGEMENT (OUTPATIENT)
Dept: CALL CENTER | Facility: HOSPITAL | Age: 86
End: 2021-12-23

## 2021-12-23 NOTE — OUTREACH NOTE
General Surgery Week 2 Survey      Responses   [facility] patient discharged from? [place]   Does the patient have one of the following disease processes/diagnoses(primary or secondary)? General Surgery   Week 2 attempt successful? No   Unsuccessful attempts Attempt 1          Mary Han RN

## 2021-12-28 PROBLEM — C83.398 DIFFUSE LARGE B-CELL LYMPHOMA OF SOLID ORGAN EXCLUDING SPLEEN: Status: ACTIVE | Noted: 2021-12-28

## 2021-12-28 PROBLEM — C83.39 DIFFUSE LARGE B-CELL LYMPHOMA OF SOLID ORGAN EXCLUDING SPLEEN (HCC): Status: ACTIVE | Noted: 2021-12-28

## 2021-12-28 NOTE — PROGRESS NOTES
DATE OF VISIT: 12/29/2021     REASON FOR VISIT:   1. Prostate cancer. Presented with stage I, [id number], PSA at diagnosis 3 status  post prostatectomy in 2000.  2. Relapsed disease with rise in PSA gradually over the last couple of years,  most recent PSA 7.1 ng/mL on 01/25/2016.      HISTORY OF PRESENT ILLNESS: The patient is a very pleasant 80-year-old  gentleman with past medical history significant for prostate cancer status post  radical prostatectomy in 2000. The patient never received adjuvant treatments,  neither radiation, GnRH or antiandrogen treatment. The patient's PSA started  to go up gradually over the last 2 years. Patient was referred to me on  06/13/2014 and I did restaging workup that came back negative including bone  scan, CAT scans chest, abdomen and pelvis. Patient is here today in scheduled followup visit.      SUBJECTIVE: Mr. Dietz is here today by himself.  He has met with Dr. Chu and the plan is to proceed with nephrectomy next week.    REVIEW OF SYSTEMS: All the other 9 systems are reviewed by me and negative  except what is mentioned in HPI and subjective.      PAST MEDICAL HISTORY/SOCIAL HISTORY/FAMILY HISTORY: Unchanged from my prior  documentation on 06/13/2014.       Current Outpatient Medications:   •  allopurinol (ZYLOPRIM) 300 MG tablet, TAKE 1 TABLET BY MOUTH ONE TIME A DAY  (Patient taking differently: Take 300 mg by mouth Daily.), Disp: 90 tablet, Rfl: 0  •  amLODIPine (NORVASC) 5 MG tablet, Take 1 tablet by mouth Daily., Disp: 30 tablet, Rfl: 1  •  apixaban (ELIQUIS) 5 MG tablet tablet, Take 1 tablet by mouth Every 12 (Twelve) Hours., Disp: , Rfl:   •  Cholecalciferol (VITAMIN D3) 2000 UNITS capsule, Take  by mouth 2 (two) times a day., Disp: , Rfl:   •  Cinnamon 500 MG tablet, Take 2,000 tablets by mouth Daily., Disp: , Rfl:   •  Coenzyme Q10 100 MG tablet, Take 2 tablets by mouth Daily., Disp: , Rfl:   •  docusate sodium (Colace) 100 MG capsule, Take 1 capsule by mouth  Daily As Needed for Constipation., Disp: 30 capsule, Rfl: 1  •  Dulaglutide (Trulicity) 1.5 MG/0.5ML solution pen-injector, Inject 0.75 mg under the skin into the appropriate area as directed 1 (One) Time Per Week., Disp: 6 mL, Rfl: 0  •  fenofibric acid (TRILIPIX) 135 MG capsule delayed-release delayed release capsule, TAKE 1 CAPSULE BY MOUTH ONE TIME A DAY  (Patient taking differently: Take 135 mg by mouth Daily.), Disp: 30 capsule, Rfl: 2  •  fluticasone (FLONASE) 50 MCG/ACT nasal spray, 2 sprays into the nostril(s) as directed by provider Daily. 2 puffs each nostril (Patient taking differently: 2 sprays into the nostril(s) as directed by provider Daily As Needed for Rhinitis or Allergies. 2 puffs each nostril), Disp: 1 bottle, Rfl: 0  •  HYDROcodone-acetaminophen (NORCO) 7.5-325 MG per tablet, Take 1 tablet by mouth Every 6 (Six) Hours As Needed for Moderate Pain ., Disp: 30 tablet, Rfl: 0  •  Jardiance 10 MG tablet, TAKE 1 TABLET BY MOUTH EVERY DAY  (Patient taking differently: Take 10 mg by mouth Daily.), Disp: 30 tablet, Rfl: 3  •  l-methylfolate-algae-B6-B12 (METANX) 3-90.314-2-35 MG capsule capsule, TAKE 1 CAPSULE BY MOUTH TWICE A DAY. (Patient taking differently: Take 1 capsule by mouth 2 (Two) Times a Day.), Disp: 180 capsule, Rfl: 1  •  Lantus 100 UNIT/ML injection, Up to 50 units daily (Patient taking differently: Inject 45 Units under the skin into the appropriate area as directed Every Night. Up to 50 units daily), Disp: 20 mL, Rfl: 5  •  LECITHIN PO, Take 1,200 mg by mouth Daily., Disp: , Rfl:   •  leuprolide (LUPRON) 22.5 MG injection, Inject 22.5 mg into the appropriate muscle as directed by prescriber Every 3 (Three) Months., Disp: , Rfl:   •  losartan-hydrochlorothiazide (HYZAAR) 100-25 MG per tablet, Take 1 tablet by mouth Daily., Disp: , Rfl:   •  metFORMIN ER (GLUCOPHAGE-XR) 500 MG 24 hr tablet, Take 1 tablet by mouth 2 (Two) Times a Day. (Patient taking differently: Take 500 mg by mouth Daily  "With Breakfast.), Disp: 60 tablet, Rfl: 5  •  methocarbamol (ROBAXIN) 500 MG tablet, Take 1 tablet by mouth 3 (Three) Times a Day As Needed for Muscle Spasms., Disp: 30 tablet, Rfl: 0  •  metoprolol succinate XL (TOPROL-XL) 100 MG 24 hr tablet, TAKE 1 TABLET BY MOUTH EVERY DAY  (Patient taking differently: Take 100 mg by mouth Daily.), Disp: 90 tablet, Rfl: 0  •  nitrofurantoin, macrocrystal-monohydrate, (Macrobid) 100 MG capsule, Take 1 capsule by mouth 2 (Two) Times a Day., Disp: 14 capsule, Rfl: 0  •  ondansetron (ZOFRAN) 8 MG tablet, Take 1 tablet by mouth 3 (Three) Times a Day As Needed for Nausea or Vomiting., Disp: 30 tablet, Rfl: 5  •  polyethylene glycol (MIRALAX) 17 GM/SCOOP powder, Dissolve 17 grams (1 capful) in 8 ounces of water and drink Daily., Disp: 238 g, Rfl: 0  •  pravastatin (PRAVACHOL) 40 MG tablet, TAKE ONE TABLET BY MOUTH EVERY NIGHT AT BEDTIME (Patient taking differently: Take 40 mg by mouth Every Night.), Disp: 90 tablet, Rfl: 0  •  triamterene-hydrochlorothiazide (MAXZIDE) 75-50 MG per tablet, Take 1 tablet by mouth Daily., Disp: , Rfl:     PHYSICAL EXAMINATION:   BP (!) 188/86   Pulse 92   Temp 97.7 °F (36.5 °C) (Temporal)   Resp 16   Ht 167.6 cm (66\")   Wt 89.4 kg (197 lb)   SpO2 94%   BMI 31.80 kg/m²   ECOG1  GENERAL: Age appropriate. No acute distress.   HEENT: Head atraumatic, normocephalic.   NECK: Supple. No JVD. No lymphadenopathy.   LUNGS: Clear to auscultation bilaterally. No wheezing. No rhonchi.   HEART: Regular rate and rhythm. S1, S2, no murmurs.   ABDOMEN: Soft, nontender, nondistended. Bowel sounds positive. No  hepatosplenomegaly.   EXTREMITIES: No clubbing, cyanosis, or edema.   SKIN: No rashes. No purpura.   NEUROLOGIC: Awake and oriented x3. Strength 5 out of 5 in all muscle groups.     No visits with results within 2 Week(s) from this visit.   Latest known visit with results is:   Admission on 12/07/2021, Discharged on 12/13/2021   Component Date Value Ref Range " Status   • Glucose 12/07/2021 138* 70 - 130 mg/dL Final    Meter: [id number] : [id number] Abiodun Anne   • Case Report 12/07/2021    Final                    Value:Surgical Pathology Report                         Case: [id number]                                  Authorizing Provider:  José Miguel Villafuerte Jr., Collected:           12/07/2021 10:53 AM                                 MD                                                                           Ordering Location:     [facility]   Received:            12/07/2021 02:04 PM                                 OR                                                                           Pathologist:           Brandt Navas MD                                                        Specimens:   1) - Kidney, Right, right adrenal gland and ureter                                                  2) - Ureter, Right, distal margin is opposite clip                                        • [facility], Addendum 12/07/2021    Final                    Value:This result contains rich text formatting which cannot be displayed here.   • Clinical Information 12/07/2021    Final                    Value:This result contains rich text formatting which cannot be displayed here.   • Final Diagnosis 12/07/2021    Final                    Value:This result contains rich text formatting which cannot be displayed here.   • Comment 12/07/2021    Final                    Value:This result contains rich text formatting which cannot be displayed here.   • Gross Description 12/07/2021    Final                    Value:This result contains rich text formatting which cannot be displayed here.   • Microscopic Description 12/07/2021    Final                    Value:This result contains rich text formatting which cannot be displayed here.   • WBC 12/07/2021 11.58* 3.40 - 10.80 10*3/mm3 Final   • RBC 12/07/2021 4.52  4.14 - 5.80 10*6/mm3 Final   • Hemoglobin  12/07/2021 13.0  13.0 - 17.7 g/dL Final   • Hematocrit 12/07/2021 41.3  37.5 - 51.0 % Final   • MCV 12/07/2021 91.4  79.0 - 97.0 fL Final   • MCH 12/07/2021 28.8  26.6 - 33.0 pg Final   • MCHC 12/07/2021 31.5  31.5 - 35.7 g/dL Final   • RDW 12/07/2021 13.6  12.3 - 15.4 % Final   • RDW-SD 12/07/2021 45.8  37.0 - 54.0 fl Final   • MPV 12/07/2021 10.5  6.0 - 12.0 fL Final   • Platelets 12/07/2021 155  140 - 450 10*3/mm3 Final   • Glucose 12/07/2021 195* 65 - 99 mg/dL Final   • BUN 12/07/2021 21  8 - 23 mg/dL Final   • Creatinine 12/07/2021 1.20  0.76 - 1.27 mg/dL Final   • Sodium 12/07/2021 141  136 - 145 mmol/L Final   • Potassium 12/07/2021 3.8  3.5 - 5.2 mmol/L Final   • Chloride 12/07/2021 102  98 - 107 mmol/L Final   • CO2 12/07/2021 28.0  22.0 - 29.0 mmol/L Final   • Calcium 12/07/2021 8.6  8.6 - 10.5 mg/dL Final   • eGFR Non African Amer 12/07/2021 58* >60 mL/min/1.73 Final   • BUN/Creatinine Ratio 12/07/2021 17.5  7.0 - 25.0 Final   • Anion Gap 12/07/2021 11.0  5.0 - 15.0 mmol/L Final   • Glucose 12/07/2021 201* 70 - 130 mg/dL Final    Meter: [id number] : [id number] Kenny MCKEON   • Glucose 12/07/2021 236* 70 - 130 mg/dL Final    Meter: [id number] : [id number] Linda Atkinson   • Glucose 12/07/2021 224* 70 - 130 mg/dL Final    Meter: [id number] : [id number] Frankie Gottlieb   • Glucose 12/07/2021 275* 70 - 130 mg/dL Final    Meter: [id number] : [id number] Jose Ramon Meza   • WBC 12/08/2021 16.57* 3.40 - 10.80 10*3/mm3 Final   • RBC 12/08/2021 4.20  4.14 - 5.80 10*6/mm3 Final   • Hemoglobin 12/08/2021 12.4* 13.0 - 17.7 g/dL Final   • Hematocrit 12/08/2021 38.6  37.5 - 51.0 % Final   • MCV 12/08/2021 91.9  79.0 - 97.0 fL Final   • MCH 12/08/2021 29.5  26.6 - 33.0 pg Final   • MCHC 12/08/2021 32.1  31.5 - 35.7 g/dL Final   • RDW 12/08/2021 13.8  12.3 - 15.4 % Final   • RDW-SD 12/08/2021 46.9  37.0 - 54.0 fl Final   • MPV 12/08/2021 10.6  6.0 - 12.0 fL Final   • Platelets 12/08/2021 161  140 - 450  10*3/mm3 Final   • Glucose 12/08/2021 140* 65 - 99 mg/dL Final   • BUN 12/08/2021 22  8 - 23 mg/dL Final   • Creatinine 12/08/2021 1.36* 0.76 - 1.27 mg/dL Final   • Sodium 12/08/2021 143  136 - 145 mmol/L Final   • Potassium 12/08/2021 4.3  3.5 - 5.2 mmol/L Final   • Chloride 12/08/2021 107  98 - 107 mmol/L Final   • CO2 12/08/2021 27.0  22.0 - 29.0 mmol/L Final   • Calcium 12/08/2021 8.2* 8.6 - 10.5 mg/dL Final   • eGFR Non African Amer 12/08/2021 50* >60 mL/min/1.73 Final   • BUN/Creatinine Ratio 12/08/2021 16.2  7.0 - 25.0 Final   • Anion Gap 12/08/2021 9.0  5.0 - 15.0 mmol/L Final   • Glucose 12/08/2021 161* 70 - 130 mg/dL Final    Meter: [id number] : [id number] Cee Manuel   • Glucose 12/08/2021 118  70 - 130 mg/dL Final    Meter: [id number] : [id number] Cee Manuel   • Glucose 12/08/2021 86  70 - 130 mg/dL Final    Meter: [id number] : [id number] Frankie Gottlieb   • Glucose 12/08/2021 138* 70 - 130 mg/dL Final    Meter: [id number] : [id number] Jose Ramon Meza   • Glucose 12/09/2021 107* 65 - 99 mg/dL Final   • BUN 12/09/2021 23  8 - 23 mg/dL Final   • Creatinine 12/09/2021 1.25  0.76 - 1.27 mg/dL Final   • Sodium 12/09/2021 135* 136 - 145 mmol/L Final   • Potassium 12/09/2021 4.1  3.5 - 5.2 mmol/L Final   • Chloride 12/09/2021 103  98 - 107 mmol/L Final   • CO2 12/09/2021 24.0  22.0 - 29.0 mmol/L Final   • Calcium 12/09/2021 7.8* 8.6 - 10.5 mg/dL Final   • eGFR Non African Amer 12/09/2021 55* >60 mL/min/1.73 Final   • BUN/Creatinine Ratio 12/09/2021 18.4  7.0 - 25.0 Final   • Anion Gap 12/09/2021 8.0  5.0 - 15.0 mmol/L Final   • Glucose 12/09/2021 181* 70 - 130 mg/dL Final    Meter: [id number] : [id number] Campbell   • Creatinine, Fluid 12/09/2021 1.2  mg/dL Final   • Glucose 12/09/2021 136* 70 - 130 mg/dL Final    Meter: [id number] : [id number] Campbell   • Glucose 12/09/2021 116  70 - 130 mg/dL Final    Meter: [id number] : [id number] Campbell   •  Glucose 12/09/2021 116  70 - 130 mg/dL Final    Meter: [id number] : [id number] Gideon Mirza   • Glucose 12/10/2021 82  70 - 130 mg/dL Final    Meter: [id number] : [id number] Lisy Chang   • Glucose 12/10/2021 212* 65 - 99 mg/dL Final   • BUN 12/10/2021 26* 8 - 23 mg/dL Final   • Creatinine 12/10/2021 1.22  0.76 - 1.27 mg/dL Final   • Sodium 12/10/2021 137  136 - 145 mmol/L Final   • Potassium 12/10/2021 4.7  3.5 - 5.2 mmol/L Final    Slight hemolysis detected by analyzer. Results may be affected.   • Chloride 12/10/2021 105  98 - 107 mmol/L Final   • CO2 12/10/2021 24.0  22.0 - 29.0 mmol/L Final   • Calcium 12/10/2021 8.2* 8.6 - 10.5 mg/dL Final   • eGFR Non African Amer 12/10/2021 57* >60 mL/min/1.73 Final   • BUN/Creatinine Ratio 12/10/2021 21.3  7.0 - 25.0 Final   • Anion Gap 12/10/2021 8.0  5.0 - 15.0 mmol/L Final   • WBC 12/10/2021 14.45* 3.40 - 10.80 10*3/mm3 Final   • RBC 12/10/2021 4.37  4.14 - 5.80 10*6/mm3 Final   • Hemoglobin 12/10/2021 12.7* 13.0 - 17.7 g/dL Final   • Hematocrit 12/10/2021 39.5  37.5 - 51.0 % Final   • MCV 12/10/2021 90.4  79.0 - 97.0 fL Final   • MCH 12/10/2021 29.1  26.6 - 33.0 pg Final   • MCHC 12/10/2021 32.2  31.5 - 35.7 g/dL Final   • RDW 12/10/2021 13.9  12.3 - 15.4 % Final   • RDW-SD 12/10/2021 45.8  37.0 - 54.0 fl Final   • MPV 12/10/2021 10.6  6.0 - 12.0 fL Final   • Platelets 12/10/2021 171  140 - 450 10*3/mm3 Final   • Glucose 12/10/2021 203* 70 - 130 mg/dL Final    Meter: [id number] : [id number] Lisy Cahng   • Glucose 12/10/2021 77  70 - 130 mg/dL Final    Meter: [id number] : [id number] Lisy Chang   • Magnesium 12/10/2021 2.2  1.6 - 2.4 mg/dL Final   • Glucose 12/10/2021 149* 70 - 130 mg/dL Final    Meter: [id number] : [id number] Susana Gordon   • Glucose 12/11/2021 119  70 - 130 mg/dL Final    Meter: [id number] : [id number] Jarett Duron   • Glucose 12/11/2021 174* 70 - 130 mg/dL Final    Meter: [id number] : [id number] Jarett  Domi   • Glucose 12/11/2021 246* 70 - 130 mg/dL Final    Meter: [id number] : [id number] Jarett Duron   • Glucose 12/11/2021 237* 70 - 130 mg/dL Final    Meter: [id number] : [id number] Ruby Wren   • Glucose 12/12/2021 128  70 - 130 mg/dL Final    Meter: [id number] : [id number] David Chery   • Glucose 12/12/2021 190* 70 - 130 mg/dL Final    Meter: [id number] : [id number] Cee Manuel   • Glucose 12/12/2021 186* 70 - 130 mg/dL Final    Meter: [id number] : [id number] Aung Campbell   • Glucose 12/12/2021 292* 70 - 130 mg/dL Final    Meter: [id number] : [id number] Wale Sera   • Glucose 12/13/2021 183* 70 - 130 mg/dL Final    Meter: [id number] : [id number] David Chery   • Glucose 12/13/2021 187* 70 - 130 mg/dL Final    Meter: [id number] : [id number] David Chery        ASSESSMENT: The patient is a very pleasant 80-year-old gentleman with relapsed  prostate cancer.     PROBLEM LIST:  1. Prostate cancer, initially presented as [id number] status post radical  prostatectomy 2000.  A.  Patient is on Lupron plus Prolia  B.  Tried to add apalutamide second rising PSA patient declined.  2.  Right-sided pulmonary embolisms:  A.  Currently on Eliquis twice a day  3.  Type 2 diabetes  4. Hhypertension  5. Hypercholesterolemia.  6.  Right subsegmental PE:  A. Started on Eliquis twice a day March 2021  7.  Osteopenia  8.  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  Status post right nephrectomy December 7, 2021    PLAN:  1.  I did go over the final pathology report from his right nephrectomy it did confirm diffuse large B-cell lymphoma no evidence of double hit since his BCL6 and MYC gene rearrangement both were negative by FISH.  2.  The patient be treated with definitive chemotherapy using R-CHOP chemotherapy 3 cycles only once every 3 weeks.  3.  I will monitor the patient blood work including blood counts kidney function liver function and lites.  4.  The  patient had cardiac echo done March 2021 revealed ejection fraction 46 to 50%.  I will repeat his baseline ejection fraction prior to starting chemotherapy.  5.  We discussed the role of intrathecal chemotherapy.  Patients who have renal lymphoma are at high risk of CNS involvement.  I do recommend intrathecal chemotherapy prophylactically using methotrexate 12 mg day 14 after each cycle.  The patient is not interested in that at this point given his comorbidities, difficulties getting to [place] for this, and early stage disease.  6.  I will arrange for PICC line placement.  7.  Plan start treatment next week.  8.   We discussed the potential risks and side effects of R CHOP chemotherapy including neutropenia, alopecia, nausea and vomiting, fatigue, neuropathy, cardiomyopathy, constipation, infusion reaction, and a small risk of myelodysplasia.  9.  I will arrange for education with the pharmacist.  10.  He will follow-up with me in 4 weeks for cycle #3.  11.  The patient is not interested in adding apalutamide for his prostate cancer treatment at this point.  12.  He will continue with [place] and Trulicity subcu for type 2 diabetes.  13.  We'll continue pravastatin for hypercholesterolemia  14.  I'll continue the patient on Prolia 60 mg subcutaneous every 6 month.  His next dose will be due March 2022.  We'll continue calcium with vitamin D daily.  15.  We will continue Eliquis 5 mg twice a day.  I will reduce his dose to 2.5 mg twice a day on return this will be his maintenance dose.  16.  I will continue to monitor the patient blood work including blood counts kidney function liver function electrolytes and serum PSA.  I shared with the patient his PSA has been slowly increasing.  17.  We will continue Lupron every 3 months, his next treatment is due February 23, 2021.    Total time of patient care including preparation of patient chart prior to arrival, face-to-face with patient and following visit spent in  reviewing records, lab results, imaging studies, discussion with patient, and documentation/charting was 40 minutes       Shannan Ramon MD    12/29/2021

## 2021-12-29 ENCOUNTER — OFFICE VISIT (OUTPATIENT)
Dept: ONCOLOGY | Facility: CLINIC | Age: 86
End: 2021-12-29

## 2021-12-29 VITALS
TEMPERATURE: 97.7 F | HEART RATE: 92 BPM | HEIGHT: 66 IN | RESPIRATION RATE: 16 BRPM | DIASTOLIC BLOOD PRESSURE: 86 MMHG | SYSTOLIC BLOOD PRESSURE: 188 MMHG | WEIGHT: 197 LBS | BODY MASS INDEX: 31.66 KG/M2 | OXYGEN SATURATION: 94 %

## 2021-12-29 DIAGNOSIS — C83.39 DIFFUSE LARGE B-CELL LYMPHOMA OF SOLID ORGAN EXCLUDING SPLEEN (HCC): Primary | ICD-10-CM

## 2021-12-29 DIAGNOSIS — C61 PROSTATE CANCER (HCC): ICD-10-CM

## 2021-12-29 DIAGNOSIS — R94.31 ABNORMAL ELECTROCARDIOGRAM (ECG) (EKG): ICD-10-CM

## 2021-12-29 PROCEDURE — 99215 OFFICE O/P EST HI 40 MIN: CPT | Performed by: INTERNAL MEDICINE

## 2021-12-29 RX ORDER — MEPERIDINE HYDROCHLORIDE 25 MG/ML
25 INJECTION INTRAMUSCULAR; INTRAVENOUS; SUBCUTANEOUS
Status: CANCELLED | OUTPATIENT
Start: 2022-02-07

## 2021-12-29 RX ORDER — FAMOTIDINE 10 MG/ML
20 INJECTION, SOLUTION INTRAVENOUS AS NEEDED
Status: CANCELLED | OUTPATIENT
Start: 2022-02-07

## 2021-12-29 RX ORDER — PALONOSETRON 0.05 MG/ML
0.25 INJECTION, SOLUTION INTRAVENOUS ONCE
Status: CANCELLED | OUTPATIENT
Start: 2022-02-07

## 2021-12-29 RX ORDER — PALONOSETRON 0.05 MG/ML
0.25 INJECTION, SOLUTION INTRAVENOUS ONCE
Status: CANCELLED | OUTPATIENT
Start: 2022-01-05

## 2021-12-29 RX ORDER — SODIUM CHLORIDE 9 MG/ML
250 INJECTION, SOLUTION INTRAVENOUS ONCE
Status: CANCELLED | OUTPATIENT
Start: 2022-02-07

## 2021-12-29 RX ORDER — ACETAMINOPHEN 325 MG/1
650 TABLET ORAL ONCE
Status: CANCELLED | OUTPATIENT
Start: 2022-02-07

## 2021-12-29 RX ORDER — FAMOTIDINE 10 MG/ML
20 INJECTION, SOLUTION INTRAVENOUS AS NEEDED
Status: CANCELLED | OUTPATIENT
Start: 2022-01-05

## 2021-12-29 RX ORDER — DIPHENHYDRAMINE HYDROCHLORIDE 50 MG/ML
50 INJECTION INTRAMUSCULAR; INTRAVENOUS AS NEEDED
Status: CANCELLED | OUTPATIENT
Start: 2022-01-05

## 2021-12-29 RX ORDER — DOXORUBICIN HYDROCHLORIDE 2 MG/ML
37.5 INJECTION, SOLUTION INTRAVENOUS ONCE
Status: CANCELLED | OUTPATIENT
Start: 2022-02-07

## 2021-12-29 RX ORDER — DOXORUBICIN HYDROCHLORIDE 2 MG/ML
37.5 INJECTION, SOLUTION INTRAVENOUS ONCE
Status: CANCELLED | OUTPATIENT
Start: 2022-01-05

## 2021-12-29 RX ORDER — DIPHENHYDRAMINE HYDROCHLORIDE 50 MG/ML
50 INJECTION INTRAMUSCULAR; INTRAVENOUS AS NEEDED
Status: CANCELLED | OUTPATIENT
Start: 2022-02-07

## 2021-12-29 RX ORDER — SODIUM CHLORIDE 9 MG/ML
250 INJECTION, SOLUTION INTRAVENOUS ONCE
Status: CANCELLED | OUTPATIENT
Start: 2022-01-05

## 2021-12-29 RX ORDER — OLANZAPINE 5 MG/1
5 TABLET ORAL ONCE
Status: CANCELLED | OUTPATIENT
Start: 2022-02-07

## 2021-12-29 RX ORDER — MEPERIDINE HYDROCHLORIDE 25 MG/ML
25 INJECTION INTRAMUSCULAR; INTRAVENOUS; SUBCUTANEOUS
Status: CANCELLED | OUTPATIENT
Start: 2022-01-05

## 2021-12-29 RX ORDER — OLANZAPINE 5 MG/1
5 TABLET ORAL ONCE
Status: CANCELLED | OUTPATIENT
Start: 2022-01-05

## 2021-12-29 RX ORDER — ACETAMINOPHEN 325 MG/1
650 TABLET ORAL ONCE
Status: CANCELLED | OUTPATIENT
Start: 2022-01-05

## 2022-01-03 ENCOUNTER — READMISSION MANAGEMENT (OUTPATIENT)
Dept: CALL CENTER | Facility: HOSPITAL | Age: 87
End: 2022-01-03

## 2022-01-03 DIAGNOSIS — C83.39 DIFFUSE LARGE B-CELL LYMPHOMA OF SOLID ORGAN EXCLUDING SPLEEN: Primary | ICD-10-CM

## 2022-01-03 DIAGNOSIS — C61 PROSTATE CANCER: ICD-10-CM

## 2022-01-03 RX ORDER — PREDNISONE 50 MG/1
100 TABLET ORAL DAILY
Qty: 10 TABLET | Refills: 5 | Status: SHIPPED | OUTPATIENT
Start: 2022-01-03 | End: 2022-01-08

## 2022-01-03 RX ORDER — ONDANSETRON HYDROCHLORIDE 8 MG/1
8 TABLET, FILM COATED ORAL 3 TIMES DAILY PRN
Qty: 30 TABLET | Refills: 5 | Status: SHIPPED | OUTPATIENT
Start: 2022-01-03 | End: 2022-06-13

## 2022-01-03 NOTE — OUTREACH NOTE
General Surgery Week 3 Survey      Responses   [facility] patient discharged from? [place]   Does the patient have one of the following disease processes/diagnoses(primary or secondary)? General Surgery   Week 3 attempt successful? No   Unsuccessful attempts Attempt 1   Wrap up additional comments UTR x2          Mary Corrigan, RN

## 2022-01-04 ENCOUNTER — HOSPITAL ENCOUNTER (OUTPATIENT)
Dept: CARDIOLOGY | Facility: HOSPITAL | Age: 87
Discharge: HOME | End: 2022-01-04
Admitting: INTERNAL MEDICINE

## 2022-01-04 VITALS — HEIGHT: 66 IN | BODY MASS INDEX: 30.08 KG/M2 | WEIGHT: 187.17 LBS

## 2022-01-04 DIAGNOSIS — R94.31 ABNORMAL ELECTROCARDIOGRAM (ECG) (EKG): ICD-10-CM

## 2022-01-04 DIAGNOSIS — C83.39 DIFFUSE LARGE B-CELL LYMPHOMA OF SOLID ORGAN EXCLUDING SPLEEN: ICD-10-CM

## 2022-01-04 LAB
BH CV ECHO MEAS - AI DEC SLOPE: 243 CM/SEC^2
BH CV ECHO MEAS - AI MAX PG: 44.6 MMHG
BH CV ECHO MEAS - AI MAX VEL: 334 CM/SEC
BH CV ECHO MEAS - AI P1/2T: 402.6 MSEC
BH CV ECHO MEAS - AO MAX PG (FULL): 2.4 MMHG
BH CV ECHO MEAS - AO MAX PG: 6 MMHG
BH CV ECHO MEAS - AO MEAN PG (FULL): 1 MMHG
BH CV ECHO MEAS - AO MEAN PG: 3 MMHG
BH CV ECHO MEAS - AO ROOT AREA (BSA CORRECTED): 1.7
BH CV ECHO MEAS - AO ROOT AREA: 9.3 CM^2
BH CV ECHO MEAS - AO ROOT DIAM: 3.4 CM
BH CV ECHO MEAS - AO V2 MAX: 118 CM/SEC
BH CV ECHO MEAS - AO V2 MEAN: 74.2 CM/SEC
BH CV ECHO MEAS - AO V2 VTI: 21.1 CM
BH CV ECHO MEAS - ASC AORTA: 4.4 CM
BH CV ECHO MEAS - AVA(I,A): 3.8 CM^2
BH CV ECHO MEAS - AVA(I,D): 3.8 CM^2
BH CV ECHO MEAS - AVA(V,A): 3.3 CM^2
BH CV ECHO MEAS - AVA(V,D): 3.3 CM^2
BH CV ECHO MEAS - BSA(HAYCOCK): 2.1 M^2
BH CV ECHO MEAS - BSA: 2 M^2
BH CV ECHO MEAS - BZI_BMI: 31.8 KILOGRAMS/M^2
BH CV ECHO MEAS - BZI_METRIC_HEIGHT: 167.6 CM
BH CV ECHO MEAS - BZI_METRIC_WEIGHT: 89.4 KG
BH CV ECHO MEAS - EDV(MOD-SP2): 147 ML
BH CV ECHO MEAS - EDV(MOD-SP4): 166 ML
BH CV ECHO MEAS - EF(MOD-BP): 50.9 %
BH CV ECHO MEAS - EF(MOD-SP2): 58.8 %
BH CV ECHO MEAS - EF(MOD-SP4): 40.7 %
BH CV ECHO MEAS - ESV(MOD-SP2): 60.6 ML
BH CV ECHO MEAS - ESV(MOD-SP4): 98.5 ML
BH CV ECHO MEAS - LAD MAJOR: 6.1 CM
BH CV ECHO MEAS - LAT PEAK E' VEL: 10.2 CM/SEC
BH CV ECHO MEAS - LATERAL E/E' RATIO: 8.8
BH CV ECHO MEAS - LV DIASTOLIC VOL/BSA (35-75): 83.5 ML/M^2
BH CV ECHO MEAS - LV MAX PG: 3.6 MMHG
BH CV ECHO MEAS - LV MEAN PG: 2 MMHG
BH CV ECHO MEAS - LV SYSTOLIC VOL/BSA (12-30): 49.6 ML/M^2
BH CV ECHO MEAS - LV V1 MAX: 94.6 CM/SEC
BH CV ECHO MEAS - LV V1 MEAN: 60.8 CM/SEC
BH CV ECHO MEAS - LV V1 VTI: 19.6 CM
BH CV ECHO MEAS - LVLD AP2: 10 CM
BH CV ECHO MEAS - LVLD AP4: 9.2 CM
BH CV ECHO MEAS - LVLS AP2: 8.6 CM
BH CV ECHO MEAS - LVLS AP4: 8.1 CM
BH CV ECHO MEAS - LVOT AREA (M): 4.1 CM^2
BH CV ECHO MEAS - LVOT AREA: 4.1 CM^2
BH CV ECHO MEAS - LVOT DIAM: 2.3 CM
BH CV ECHO MEAS - MED PEAK E' VEL: 6.2 CM/SEC
BH CV ECHO MEAS - MEDIAL E/E' RATIO: 14.5
BH CV ECHO MEAS - MV A MAX VEL: 107 CM/SEC
BH CV ECHO MEAS - MV E MAX VEL: 89.4 CM/SEC
BH CV ECHO MEAS - MV E/A: 0.84
BH CV ECHO MEAS - MV MAX PG: 4.5 MMHG
BH CV ECHO MEAS - MV MEAN PG: 2 MMHG
BH CV ECHO MEAS - MV V2 MAX: 106 CM/SEC
BH CV ECHO MEAS - MV V2 MEAN: 62.2 CM/SEC
BH CV ECHO MEAS - MV V2 VTI: 19 CM
BH CV ECHO MEAS - MVA(VTI): 4.2 CM^2
BH CV ECHO MEAS - PA MAX PG: 8.1 MMHG
BH CV ECHO MEAS - PA MEAN PG: 3 MMHG
BH CV ECHO MEAS - PA V2 MAX: 142 CM/SEC
BH CV ECHO MEAS - PA V2 MEAN: 80 CM/SEC
BH CV ECHO MEAS - PA V2 VTI: 25.8 CM
BH CV ECHO MEAS - PI END-D VEL: 92.3 CM/SEC
BH CV ECHO MEAS - SI(AO): 98.7 ML/M^2
BH CV ECHO MEAS - SI(LVOT): 40.6 ML/M^2
BH CV ECHO MEAS - SI(MOD-SP2): 43.5 ML/M^2
BH CV ECHO MEAS - SI(MOD-SP4): 34 ML/M^2
BH CV ECHO MEAS - SV(AO): 196.1 ML
BH CV ECHO MEAS - SV(LVOT): 80.7 ML
BH CV ECHO MEAS - SV(MOD-SP2): 86.4 ML
BH CV ECHO MEAS - SV(MOD-SP4): 67.5 ML
BH CV ECHO MEAS - TAPSE (>1.6): 2.2 CM
BH CV ECHO MEASUREMENTS AVERAGE E/E' RATIO: 10.9
BH CV XLRA - RV BASE: 2.5 CM
BH CV XLRA - RV LENGTH: 7.7 CM
BH CV XLRA - RV MID: 2 CM
BH CV XLRA - TDI S': 11 CM/SEC
LEFT ATRIUM VOLUME INDEX: 47.9 ML/M^2
LEFT ATRIUM VOLUME: 95.2 ML
LV EF 2D ECHO EST: 52 %
MAXIMAL PREDICTED HEART RATE: 136 BPM
STRESS TARGET HR: 116 BPM

## 2022-01-04 PROCEDURE — 93306 TTE W/DOPPLER COMPLETE: CPT | Performed by: INTERNAL MEDICINE

## 2022-01-04 PROCEDURE — 93306 TTE W/DOPPLER COMPLETE: CPT

## 2022-01-06 ENCOUNTER — TELEPHONE (OUTPATIENT)
Dept: ONCOLOGY | Facility: CLINIC | Age: 87
End: 2022-01-06

## 2022-01-06 DIAGNOSIS — Z76.89 ENCOUNTER TO ESTABLISH CARE WITH NEW DOCTOR: Primary | ICD-10-CM

## 2022-01-06 DIAGNOSIS — C83.39 DIFFUSE LARGE B-CELL LYMPHOMA OF SOLID ORGAN EXCLUDING SPLEEN: Primary | ICD-10-CM

## 2022-01-06 NOTE — TELEPHONE ENCOUNTER
PATIENT CALLED BACK. INFORMED THAT REFERRAL IS MADE FOR PHYSICIAN THAT HE CAN SEE IN EITHER [place] OR [place] AND TO EXPECT A CALL REGARDING AN APPT. WITH THEM.

## 2022-01-06 NOTE — TELEPHONE ENCOUNTER
----- Message from Shelton Harden sent at 1/5/2022  2:10 PM EST -----  Regarding: RE:PHONE CALL  Pt PCP is retiring. He would like a Ref to a new PCP.   Thanks   Willie

## 2022-01-07 ENCOUNTER — APPOINTMENT (OUTPATIENT)
Dept: INFUSION THERAPY | Facility: HOSPITAL | Age: 87
End: 2022-01-07

## 2022-01-11 ENCOUNTER — TELEPHONE (OUTPATIENT)
Dept: ONCOLOGY | Facility: CLINIC | Age: 87
End: 2022-01-11

## 2022-01-11 NOTE — TELEPHONE ENCOUNTER
I spoke with him. His chemo ed appt time changed and I was just letting him know. He is all taken care of.

## 2022-01-11 NOTE — TELEPHONE ENCOUNTER
Caller: RC BLANDON    Relationship: SELF    Best call back number: [phone]    What is the best time to reach you: YES    Who are you requesting to speak with (clinical staff, provider,  specific staff member):CLINICAL STAFF    What was the call regarding: PT STATES HE MISSED A CALL FROM THE OFFICE. DOES NOT KNOW WHO CALLED OR WHY, BUT STATES IT MIGHT HAVE JUST BEEN A REMINDER FOR TOMORROWS APPT. PT IS CALLING TO MAKE SURE THE OFFICE DID NOT NEED ANYTHING    Do you require a callback: YES

## 2022-01-12 ENCOUNTER — HOSPITAL ENCOUNTER (OUTPATIENT)
Dept: INFUSION THERAPY | Facility: HOSPITAL | Age: 87
Setting detail: INFUSION SERIES
Discharge: HOME | End: 2022-01-12

## 2022-01-12 ENCOUNTER — OFFICE VISIT (OUTPATIENT)
Dept: ONCOLOGY | Facility: CLINIC | Age: 87
End: 2022-01-12

## 2022-01-12 VITALS
SYSTOLIC BLOOD PRESSURE: 194 MMHG | OXYGEN SATURATION: 98 % | BODY MASS INDEX: 31.5 KG/M2 | RESPIRATION RATE: 12 BRPM | HEART RATE: 81 BPM | HEIGHT: 66 IN | WEIGHT: 196 LBS | TEMPERATURE: 98 F | DIASTOLIC BLOOD PRESSURE: 80 MMHG

## 2022-01-12 VITALS
RESPIRATION RATE: 20 BRPM | TEMPERATURE: 98.9 F | DIASTOLIC BLOOD PRESSURE: 81 MMHG | HEART RATE: 95 BPM | SYSTOLIC BLOOD PRESSURE: 169 MMHG | OXYGEN SATURATION: 96 %

## 2022-01-12 DIAGNOSIS — C61 PROSTATE CANCER: ICD-10-CM

## 2022-01-12 DIAGNOSIS — C83.39 DIFFUSE LARGE B-CELL LYMPHOMA OF SOLID ORGAN EXCLUDING SPLEEN: Primary | ICD-10-CM

## 2022-01-12 DIAGNOSIS — C83.39 DIFFUSE LARGE B-CELL LYMPHOMA OF SOLID ORGAN EXCLUDING SPLEEN: ICD-10-CM

## 2022-01-12 LAB
ALBUMIN SERPL-MCNC: 4.2 G/DL (ref 3.5–5.2)
ALBUMIN/GLOB SERPL: 1.4 G/DL
ALP SERPL-CCNC: 111 U/L (ref 39–117)
ALT SERPL W P-5'-P-CCNC: 49 U/L (ref 1–41)
ANION GAP SERPL CALCULATED.3IONS-SCNC: 11.6 MMOL/L (ref 5–15)
AST SERPL-CCNC: 44 U/L (ref 1–40)
BASOPHILS # BLD AUTO: 0.18 10*3/MM3 (ref 0–0.2)
BASOPHILS NFR BLD AUTO: 2.2 % (ref 0–1.5)
BILIRUB SERPL-MCNC: 0.5 MG/DL (ref 0–1.2)
BUN SERPL-MCNC: 22 MG/DL (ref 8–23)
BUN/CREAT SERPL: 19 (ref 7–25)
CALCIUM SPEC-SCNC: 9.3 MG/DL (ref 8.6–10.5)
CHLORIDE SERPL-SCNC: 103 MMOL/L (ref 98–107)
CO2 SERPL-SCNC: 23.4 MMOL/L (ref 22–29)
CREAT SERPL-MCNC: 1.16 MG/DL (ref 0.76–1.27)
DEPRECATED RDW RBC AUTO: 46.4 FL (ref 37–54)
EOSINOPHIL # BLD AUTO: 0.39 10*3/MM3 (ref 0–0.4)
EOSINOPHIL NFR BLD AUTO: 4.7 % (ref 0.3–6.2)
ERYTHROCYTE [DISTWIDTH] IN BLOOD BY AUTOMATED COUNT: 14.5 % (ref 12.3–15.4)
GFR SERPL CREATININE-BSD FRML MDRD: 60 ML/MIN/1.73
GLOBULIN UR ELPH-MCNC: 2.9 GM/DL
GLUCOSE SERPL-MCNC: 166 MG/DL (ref 65–99)
HBV SURFACE AB SER RIA-ACNC: NORMAL
HBV SURFACE AG SERPL QL IA: NORMAL
HCT VFR BLD AUTO: 39.8 % (ref 37.5–51)
HGB BLD-MCNC: 12.9 G/DL (ref 13–17.7)
IMM GRANULOCYTES # BLD AUTO: 0.32 10*3/MM3 (ref 0–0.05)
IMM GRANULOCYTES NFR BLD AUTO: 3.9 % (ref 0–0.5)
LYMPHOCYTES # BLD AUTO: 1.41 10*3/MM3 (ref 0.7–3.1)
LYMPHOCYTES NFR BLD AUTO: 17 % (ref 19.6–45.3)
MCH RBC QN AUTO: 28.6 PG (ref 26.6–33)
MCHC RBC AUTO-ENTMCNC: 32.4 G/DL (ref 31.5–35.7)
MCV RBC AUTO: 88.2 FL (ref 79–97)
MONOCYTES # BLD AUTO: 0.65 10*3/MM3 (ref 0.1–0.9)
MONOCYTES NFR BLD AUTO: 7.8 % (ref 5–12)
NEUTROPHILS NFR BLD AUTO: 5.35 10*3/MM3 (ref 1.7–7)
NEUTROPHILS NFR BLD AUTO: 64.4 % (ref 42.7–76)
NRBC BLD AUTO-RTO: 0 /100 WBC (ref 0–0.2)
PLATELET # BLD AUTO: 149 10*3/MM3 (ref 140–450)
PMV BLD AUTO: 9.8 FL (ref 6–12)
POTASSIUM SERPL-SCNC: 4.4 MMOL/L (ref 3.5–5.2)
PROT SERPL-MCNC: 7.1 G/DL (ref 6–8.5)
PSA SERPL-MCNC: 4.74 NG/ML (ref 0–4)
RBC # BLD AUTO: 4.51 10*6/MM3 (ref 4.14–5.8)
SODIUM SERPL-SCNC: 138 MMOL/L (ref 136–145)
WBC NRBC COR # BLD: 8.3 10*3/MM3 (ref 3.4–10.8)

## 2022-01-12 PROCEDURE — 99215 OFFICE O/P EST HI 40 MIN: CPT | Performed by: NURSE PRACTITIONER

## 2022-01-12 PROCEDURE — 84153 ASSAY OF PSA TOTAL: CPT | Performed by: INTERNAL MEDICINE

## 2022-01-12 PROCEDURE — C1751 CATH, INF, PER/CENT/MIDLINE: HCPCS

## 2022-01-12 PROCEDURE — 80053 COMPREHEN METABOLIC PANEL: CPT | Performed by: INTERNAL MEDICINE

## 2022-01-12 PROCEDURE — 86704 HEP B CORE ANTIBODY TOTAL: CPT | Performed by: INTERNAL MEDICINE

## 2022-01-12 PROCEDURE — 86706 HEP B SURFACE ANTIBODY: CPT | Performed by: INTERNAL MEDICINE

## 2022-01-12 PROCEDURE — 85025 COMPLETE CBC W/AUTO DIFF WBC: CPT | Performed by: INTERNAL MEDICINE

## 2022-01-12 PROCEDURE — C1894 INTRO/SHEATH, NON-LASER: HCPCS

## 2022-01-12 PROCEDURE — 87340 HEPATITIS B SURFACE AG IA: CPT | Performed by: INTERNAL MEDICINE

## 2022-01-12 RX ORDER — SODIUM CHLORIDE 0.9 % (FLUSH) 0.9 %
10 SYRINGE (ML) INJECTION EVERY 12 HOURS SCHEDULED
Status: DISCONTINUED | OUTPATIENT
Start: 2022-01-12 | End: 2022-01-14 | Stop reason: HOSPADM

## 2022-01-12 RX ORDER — PEN NEEDLE, DIABETIC 29 G X1/2"
NEEDLE, DISPOSABLE MISCELLANEOUS
Status: ON HOLD | COMMUNITY
Start: 2021-12-31 | End: 2022-06-03

## 2022-01-12 RX ORDER — SODIUM CHLORIDE 0.9 % (FLUSH) 0.9 %
10 SYRINGE (ML) INJECTION AS NEEDED
Status: DISCONTINUED | OUTPATIENT
Start: 2022-01-12 | End: 2022-01-14 | Stop reason: HOSPADM

## 2022-01-12 NOTE — PROGRESS NOTES
CHEMOTHERAPY PREPARATION    Blane Dietz  [id number]  1935    Subjective   Chief Complaint: Treatment Preparation and Needs Assessment    History of present illness:  Blane Dietz is a 86 y.o. year old male who is here today for chemotherapy preparation and needs assessment. The patient has been diagnosed with Lymphoma and is scheduled to begin  IV treatment with RCHOP.     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     Past Medical History, Past Surgical History, Social History, Family History have been reviewed and are without significant changes except as mentioned.      Review of Systems   Constitutional: Positive for fatigue. Negative for activity change, appetite change and fever.   HENT: Positive for hearing loss, rhinorrhea and sinus pressure. Negative for congestion, dental problem, mouth  "sores, nosebleeds, sinus pain and tinnitus.    Eyes: Negative for photophobia, discharge and itching.   Respiratory: Positive for shortness of breath. Negative for apnea, choking and chest tightness.    Cardiovascular: Negative for chest pain and leg swelling.   Gastrointestinal: Negative for abdominal distention, abdominal pain, constipation, diarrhea, nausea and vomiting.   Endocrine: Negative for cold intolerance and heat intolerance.   Genitourinary: Negative for dysuria, flank pain, frequency, testicular pain and urgency.   Musculoskeletal: Negative for arthralgias, back pain and myalgias.   Skin: Negative for color change and pallor.   Allergic/Immunologic: Negative for environmental allergies, food allergies and immunocompromised state.   Neurological: Negative for dizziness, tremors, facial asymmetry, weakness and headaches.   Hematological: Negative for adenopathy. Does not bruise/bleed easily.   Psychiatric/Behavioral: Negative for agitation and behavioral problems. The patient is not nervous/anxious.        Objective   Physical Exam  Vital Signs: BP (!) 194/80   Pulse 81   Temp 98 °F (36.7 °C) (Temporal)   Resp 12   Ht 167.6 cm (66\")   Wt 88.9 kg (196 lb)   SpO2 98%   BMI 31.64 kg/m²    General Appearance:  alert, cooperative, no apparent distress and appears stated age   Neurologic/Psychiatric: A&O x 3, gait steady, appropriate affect   HEENT:  Normocephalic, without obvious abnormality, mucous membranes moist   Lungs:   Clear to auscultation bilaterally; respirations regular, even, and unlabored bilaterally   Heart:  Regular rate and rhythm, no murmurs appreciated   Extremities: Normal, atraumatic; no clubbing, cyanosis, or edema    Skin: No rashes, lesions, or abnormal coloration noted     ECOG Performance Status: 1 - Symptomatic but completely ambulatory            NEEDS ASSESSMENTS    Genetics  The patient's new diagnosis and family history have been reviewed for genetic counseling needs. A " "genetic referral is not recommended.     Psychosocial  The patient has completed a PHQ-9 Depression Screening and the Distress Thermometer (DT) today.   PHQ-9 results show 5-9 (Mild Depression). The patient scored their distress today as 0 on a scale of 0-10 with 0 being no distress and 10 being extreme distress.   Problems marked as being an issue for him within the last week include physical problems.   Results were reviewed along with psychosocial resources offered by our cancer center. Our oncology social worker will be flagged for a DT score of 4 or above, and a same day call will be made for a score of 9 or 10. A mental health referral is recommended at this time. The patient is not accepting of a referral to [facility].   Copies of patient's questionnaires will be scanned into EMR for details and further reference.    Barriers to care  A barriers form was also completed by the patient today. We discussed services offered by our facility to help him have adequate access to care. The patient was given the name and card for our Oncology Social Worker, Mckenna Smith. Based upon barriers assessment today, the patient will not require a follow-up call from the  to further discuss needs.   A copy of the barriers form will also be scanned into EMR for details and further reference.     VAD Assessment  The patient and I discussed planned intervenous chemotherapy as well as other IV treatments that are often needed throughout the course of treatment. These may include, but are not limited to blood transfusions, antibiotics, and IV hydration. The patient's vasculature does not appear to be adequate for multiple peripheral IVs throughout their treatment course. Discussed risks and benefits of VADs. The patient would like to pursue Port-A-Cath insertion prior to initiation of treatment.       Advance Care Planning   The patient and I discussed advanced care planning, \"Conversations that Matter\".   " This service was offered, free of charge, for development of advance directives with a certified ACP facilitator.  The patient does have an up-to-date advanced directive. This document is on file with our office. The patient is not interested in an appointment with one of our facilitators to create or update their advanced directives.         Palliative Care  The patient and I discussed palliative care services. Palliative care is not the same as Hospice care. This is specialized medical care for people living with serious illness with the goal of improving quality of life for the patient and their family. [facility] has partnered with [facility] to offer our patients outpatient palliative care early along with their treatment to assist in coordination of care, symptom management, pain management, and medical decision making.  Oncology criteria for palliative care referral is not met at this time. The patient is not interested in a palliative care consultation.     Additional Referral needs  nutrition      IV CHEMOTHERAPY EDUCATION    Booklets Given: Chemotherapy and You [x]  Eating Hints [x]    Sexuality/Fertility Books []      Chemotherapy/Biotherapy Education Sheets: (list all that apply)  nausea management, acid reflux management, diarrhea management, Cancer resourse contacts information, skin and mouth care and vaccination information                                                                                                                                                                 Chemotherapy Regimen:   Treatment Plans     Name Type Plan Dates Plan Provider         Active    OP Denosumab (Prolia) Q6M ONCOLOGY SUPPORTIVE CARE 1  6/15/2018 - Present Shannan Ramon MD     OP PROSTATE Leuprolide / Bicalutamide ONCOLOGY TREATMENT  9/5/2017 - Present Shannan Ramon MD     OP LYMPHOMA R-CHOP RiTUXimab / Cyclophosphamide / DOXOrubicin / VinCRIStine / PredniSONE ONCOLOGY TREATMENT 2  1/3/2022 -  Present Shannan Ramon MD                    TOPICS EDUCATION PROVIDED COMMENTS   ANEMIA:  role of RBC, cause, s/s, ways to manage, role of transfusion [x]    THROMBOCYTOPENIA:  role of platelet, cause, s/s, ways to prevent bleeding, things to avoid, when to seek help [x]    NEUTROPENIA:  role of WBC, cause, infection precautions, s/s of infection, when to call MD [x]    NUTRITION & APPETITE CHANGES:  importance of maintaining healthy diet & weight, ways to manage to improve intake, dietary consult, exercise regimen [x]    DIARRHEA:  causes, s/s of dehydration, ways to manage, dietary changes, when to call MD [x]    CONSTIPATION:  causes, ways to manage, dietary changes, when to call MD [x]    NAUSEA & VOMITING:  cause, use of antiemetics, dietary changes, when to call MD [x]    MOUTH SORES:  causes, oral care, ways to manage [x]    ALOPECIA:  cause, ways to manage, resources [x]    INFERTILITY & SEXUALITY:  causes, fertility preservation options, sexuality changes, ways to manage, importance of birth control [x]    NERVOUS SYSTEM CHANGES:  causes, s/s, neuropathies, cognitive changes, ways to manage [x]    PAIN:  causes, ways to manage [x]    SKIN & NAIL CHANGES:  cause, s/s, ways to manage [x]    ORGAN TOXICITIES:  cause, s/s, need for diagnostic tests, labs, when to notify MD [x]    SURVIVORSHIP:  distress, distress assessment, secondary malignancies, early/late effects, follow-up, social issues, social support [x]    HOME CARE:  use of spill kits, storing of PO chemo, how to manage bodily fluids [x]    MISCELLANEOUS:  drug interactions, administration, vesicant, et [x]                   Assessment and Plan:    Diagnoses and all orders for this visit:    1. 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   The patient and I have reviewed their new cancer  diagnosis and scheduled treatment plan. Needs assessment was completed including genetics, psychosocial needs, barriers to care, VAD evaluation, advanced care planning, and palliative care services. Referrals have been ordered as appropriate based upon our evaluation and patient desires.     IV chemotherapy teaching was also completed today as documented above. Adequate time was given to answer all questions to his satisfaction. Patient and family are aware of their care team members and contact information if they have questions or problems throughout the treatment course. Needs assessments and education has been completed. The patient is adequately prepared to begin treatment as scheduled.     Patient has PICC line in place.  He will follow-up on Friday and have dressing changed as well as start of treatment.    I discussed with the patient that prednisone was sent to his pharmacy.  He will take 2 tablets by mouth daily for 5 doses starting day 1 of treatment.  We also sent Zofran to the pharmacy.  I discussed with the patient will take 1 tablet by mouth 3 times a day as needed for nausea and vomiting.    Nutrition will see the patient on the first day of treatment.  He declined referral to social work, financial counseling, and psychiatric nurse practitioner.  He will notify us if he changes his mind.    Electronically signed by HIPOLITO Murphy on 01/12/22

## 2022-01-13 DIAGNOSIS — M1A.0710 IDIOPATHIC CHRONIC GOUT OF RIGHT FOOT WITHOUT TOPHUS: ICD-10-CM

## 2022-01-13 LAB — HBV CORE AB SERPL QL IA: NEGATIVE

## 2022-01-14 ENCOUNTER — NURSE NAVIGATOR (OUTPATIENT)
Dept: ONCOLOGY | Facility: HOSPITAL | Age: 87
End: 2022-01-14

## 2022-01-14 ENCOUNTER — INFUSION (OUTPATIENT)
Dept: ONCOLOGY | Facility: HOSPITAL | Age: 87
End: 2022-01-14

## 2022-01-14 VITALS
BODY MASS INDEX: 32.41 KG/M2 | HEART RATE: 87 BPM | WEIGHT: 200.8 LBS | OXYGEN SATURATION: 98 % | SYSTOLIC BLOOD PRESSURE: 148 MMHG | DIASTOLIC BLOOD PRESSURE: 74 MMHG | TEMPERATURE: 98.6 F | RESPIRATION RATE: 16 BRPM

## 2022-01-14 DIAGNOSIS — C61 PROSTATE CANCER: ICD-10-CM

## 2022-01-14 DIAGNOSIS — C83.39 DIFFUSE LARGE B-CELL LYMPHOMA OF SOLID ORGAN EXCLUDING SPLEEN: Primary | ICD-10-CM

## 2022-01-14 PROCEDURE — 96367 TX/PROPH/DG ADDL SEQ IV INF: CPT

## 2022-01-14 PROCEDURE — 25010000002 PALONOSETRON 0.25 MG/5ML SOLUTION PREFILLED SYRINGE: Performed by: INTERNAL MEDICINE

## 2022-01-14 PROCEDURE — 96415 CHEMO IV INFUSION ADDL HR: CPT

## 2022-01-14 PROCEDURE — 96366 THER/PROPH/DIAG IV INF ADDON: CPT

## 2022-01-14 PROCEDURE — 25010000002 DOXORUBICIN PER 10 MG: Performed by: INTERNAL MEDICINE

## 2022-01-14 PROCEDURE — 25010000002 PEGFILGRASTIM 6 MG/0.6ML PREFILLED SYRINGE KIT: Performed by: INTERNAL MEDICINE

## 2022-01-14 PROCEDURE — 96413 CHEMO IV INFUSION 1 HR: CPT

## 2022-01-14 PROCEDURE — 25010000002 CYCLOPHOSPHAMIDE 1 GM/5ML SOLUTION 5 ML VIAL: Performed by: INTERNAL MEDICINE

## 2022-01-14 PROCEDURE — 25010000002 DEXAMETHASONE PER 1 MG: Performed by: INTERNAL MEDICINE

## 2022-01-14 PROCEDURE — 25010000002 RITUXIMAB 10 MG/ML SOLUTION 10 ML VIAL: Performed by: INTERNAL MEDICINE

## 2022-01-14 PROCEDURE — 96411 CHEMO IV PUSH ADDL DRUG: CPT

## 2022-01-14 PROCEDURE — 25010000002 VINCRISTINE PER 1 MG: Performed by: INTERNAL MEDICINE

## 2022-01-14 PROCEDURE — 25010000002 CYCLOPHOSPHAMIDE 500 MG/2.5ML SOLUTION 2.5 ML VIAL: Performed by: INTERNAL MEDICINE

## 2022-01-14 PROCEDURE — 96417 CHEMO IV INFUS EACH ADDL SEQ: CPT

## 2022-01-14 PROCEDURE — 96377 APPLICATON ON-BODY INJECTOR: CPT

## 2022-01-14 PROCEDURE — 25010000002 RITUXIMAB 10 MG/ML SOLUTION 50 ML VIAL: Performed by: INTERNAL MEDICINE

## 2022-01-14 PROCEDURE — 25010000002 DIPHENHYDRAMINE PER 50 MG: Performed by: INTERNAL MEDICINE

## 2022-01-14 PROCEDURE — 96375 TX/PRO/DX INJ NEW DRUG ADDON: CPT

## 2022-01-14 PROCEDURE — 25010000002 FOSAPREPITANT PER 1 MG: Performed by: INTERNAL MEDICINE

## 2022-01-14 RX ORDER — DOXORUBICIN HYDROCHLORIDE 2 MG/ML
37.5 INJECTION, SOLUTION INTRAVENOUS ONCE
Status: COMPLETED | OUTPATIENT
Start: 2022-01-14 | End: 2022-01-14

## 2022-01-14 RX ORDER — SODIUM CHLORIDE 9 MG/ML
250 INJECTION, SOLUTION INTRAVENOUS ONCE
Status: DISCONTINUED | OUTPATIENT
Start: 2022-01-14 | End: 2022-01-14 | Stop reason: HOSPADM

## 2022-01-14 RX ORDER — PALONOSETRON 0.05 MG/ML
0.25 INJECTION, SOLUTION INTRAVENOUS ONCE
Status: COMPLETED | OUTPATIENT
Start: 2022-01-14 | End: 2022-01-14

## 2022-01-14 RX ORDER — DIPHENHYDRAMINE HYDROCHLORIDE 50 MG/ML
50 INJECTION INTRAMUSCULAR; INTRAVENOUS AS NEEDED
Status: DISCONTINUED | OUTPATIENT
Start: 2022-01-14 | End: 2022-01-14 | Stop reason: HOSPADM

## 2022-01-14 RX ORDER — FAMOTIDINE 10 MG/ML
20 INJECTION, SOLUTION INTRAVENOUS AS NEEDED
Status: DISCONTINUED | OUTPATIENT
Start: 2022-01-14 | End: 2022-01-14 | Stop reason: HOSPADM

## 2022-01-14 RX ORDER — MEPERIDINE HYDROCHLORIDE 25 MG/ML
25 INJECTION INTRAMUSCULAR; INTRAVENOUS; SUBCUTANEOUS
Status: DISCONTINUED | OUTPATIENT
Start: 2022-01-14 | End: 2022-01-14 | Stop reason: HOSPADM

## 2022-01-14 RX ORDER — ALLOPURINOL 300 MG/1
TABLET ORAL
Qty: 90 TABLET | Refills: 0 | Status: SHIPPED | OUTPATIENT
Start: 2022-01-14 | End: 2022-08-01 | Stop reason: SDUPTHER

## 2022-01-14 RX ORDER — ACETAMINOPHEN 325 MG/1
650 TABLET ORAL ONCE
Status: COMPLETED | OUTPATIENT
Start: 2022-01-14 | End: 2022-01-14

## 2022-01-14 RX ORDER — OLANZAPINE 5 MG/1
5 TABLET ORAL ONCE
Status: COMPLETED | OUTPATIENT
Start: 2022-01-14 | End: 2022-01-14

## 2022-01-14 RX ADMIN — VINCRISTINE SULFATE 2 MG: 1 INJECTION, SOLUTION INTRAVENOUS at 13:59

## 2022-01-14 RX ADMIN — PEGFILGRASTIM 6 MG: KIT SUBCUTANEOUS at 14:50

## 2022-01-14 RX ADMIN — CYCLOPHOSPHAMIDE 1490 MG: 200 INJECTION, SOLUTION INTRAVENOUS at 14:18

## 2022-01-14 RX ADMIN — DOXORUBICIN HYDROCHLORIDE 74 MG: 2 INJECTION, SOLUTION INTRAVENOUS at 13:47

## 2022-01-14 RX ADMIN — DEXAMETHASONE SODIUM PHOSPHATE 12 MG: 4 INJECTION, SOLUTION INTRA-ARTICULAR; INTRALESIONAL; INTRAMUSCULAR; INTRAVENOUS; SOFT TISSUE at 12:56

## 2022-01-14 RX ADMIN — OLANZAPINE 5 MG: 5 TABLET, FILM COATED ORAL at 12:56

## 2022-01-14 RX ADMIN — RITUXIMAB 750 MG: 10 INJECTION, SOLUTION INTRAVENOUS at 09:17

## 2022-01-14 RX ADMIN — DIPHENHYDRAMINE HYDROCHLORIDE 50 MG: 50 INJECTION INTRAMUSCULAR; INTRAVENOUS at 08:41

## 2022-01-14 RX ADMIN — PALONOSETRON 0.25 MG: 0.25 INJECTION, SOLUTION INTRAVENOUS at 13:12

## 2022-01-14 RX ADMIN — FOSAPREPITANT 150 MG: 150 INJECTION, POWDER, LYOPHILIZED, FOR SOLUTION INTRAVENOUS at 13:13

## 2022-01-14 RX ADMIN — ACETAMINOPHEN 650 MG: 325 TABLET ORAL at 08:39

## 2022-01-14 NOTE — PROGRESS NOTES
"Oncology Nutrition Screening    Patient Name:  Blane Dietz  YOB: 1935  MRN: [id number]  Date:  01/14/22  Physician:  Shannan Ramon MD    Type of Cancer Treatment:   Chemotherapy:  Type: Definitive; R-CHOP  Treatment Schedule: 3 cycles, Q3 weeks    Patient Active Problem List   Diagnosis   • Essential hypertension   • Uncontrolled type 2 diabetes mellitus with hyperglycemia (HCC)   • Prostate cancer (HCC)   • Sleep apnea   • S/P AVR   • Pulmonary embolism (HCC)   • HLD (hyperlipidemia)   • T2DM (type 2 diabetes mellitus) (HCC)   • Leukocytosis   • Elevated LFTs   • Right kidney mass   • Acute respiratory failure with hypoxia (HCC)   • Pneumonia   • s/p right nepheroureterectomy and adrenalectomy    • Acute postoperative pain   • Renal insufficiency   • 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 30 tablet 1   • apixaban (ELIQUIS) 5 MG tablet tablet Take 1 tablet by mouth Every 12 (Twelve) Hours.     • BD Insulin Syringe U/F 31G X 5/16\" 0.5 ML misc      • Cholecalciferol (VITAMIN D3) 2000 UNITS capsule Take  by mouth 2 (two) times a day.     • Cinnamon 500 MG tablet Take 2,000 tablets by mouth Daily.     • Coenzyme Q10 100 MG tablet Take 2 tablets by mouth Daily.     • docusate sodium (Colace) 100 MG capsule Take 1 capsule by mouth Daily As Needed for Constipation. 30 capsule 1   • Dulaglutide (Trulicity) 1.5 MG/0.5ML solution pen-injector Inject 0.75 mg under the skin into the appropriate area as directed 1 (One) Time Per Week. 6 mL 0   • fenofibric acid (TRILIPIX) 135 MG capsule delayed-release delayed release capsule TAKE 1 CAPSULE BY MOUTH ONE TIME A DAY  (Patient taking differently: Take 135 mg by mouth Daily.) 30 capsule 2   • fluticasone " (FLONASE) 50 MCG/ACT nasal spray 2 sprays into the nostril(s) as directed by provider Daily. 2 puffs each nostril (Patient taking differently: 2 sprays into the nostril(s) as directed by provider Daily As Needed for Rhinitis or Allergies. 2 puffs each nostril) 1 bottle 0   • HYDROcodone-acetaminophen (NORCO) 7.5-325 MG per tablet Take 1 tablet by mouth Every 6 (Six) Hours As Needed for Moderate Pain . 30 tablet 0   • Jardiance 10 MG tablet TAKE 1 TABLET BY MOUTH EVERY DAY  (Patient taking differently: Take 10 mg by mouth Daily.) 30 tablet 3   • l-methylfolate-algae-B6-B12 (METANX) 3-90.314-2-35 MG capsule capsule TAKE 1 CAPSULE BY MOUTH TWICE A DAY. (Patient taking differently: Take 1 capsule by mouth 2 (Two) Times a Day.) 180 capsule 1   • Lantus 100 UNIT/ML injection Up to 50 units daily (Patient taking differently: Inject 45 Units under the skin into the appropriate area as directed Every Night. Up to 50 units daily) 20 mL 5   • LECITHIN PO Take 1,200 mg by mouth Daily.     • leuprolide (LUPRON) 22.5 MG injection Inject 22.5 mg into the appropriate muscle as directed by prescriber Every 3 (Three) Months.     • losartan-hydrochlorothiazide (HYZAAR) 100-25 MG per tablet Take 1 tablet by mouth Daily.     • metFORMIN ER (GLUCOPHAGE-XR) 500 MG 24 hr tablet Take 1 tablet by mouth 2 (Two) Times a Day. (Patient taking differently: Take 500 mg by mouth Daily With Breakfast.) 60 tablet 5   • methocarbamol (ROBAXIN) 500 MG tablet Take 1 tablet by mouth 3 (Three) Times a Day As Needed for Muscle Spasms. 30 tablet 0   • metoprolol succinate XL (TOPROL-XL) 100 MG 24 hr tablet TAKE 1 TABLET BY MOUTH EVERY DAY  (Patient taking differently: Take 100 mg by mouth Daily.) 90 tablet 0   • nitrofurantoin, macrocrystal-monohydrate, (Macrobid) 100 MG capsule Take 1 capsule by mouth 2 (Two) Times a Day. 14 capsule 0   • ondansetron (ZOFRAN) 8 MG tablet Take 1 tablet by mouth 3 (Three) Times a Day As Needed for Nausea or Vomiting. 30  tablet 5   • ondansetron (ZOFRAN) 8 MG tablet Take 1 tablet by mouth 3 (Three) Times a Day As Needed for Nausea or Vomiting. 30 tablet 5   • polyethylene glycol (MIRALAX) 17 GM/SCOOP powder Dissolve 17 grams (1 capful) in 8 ounces of water and drink Daily. 238 g 0   • pravastatin (PRAVACHOL) 40 MG tablet TAKE ONE TABLET BY MOUTH EVERY NIGHT AT BEDTIME (Patient taking differently: Take 40 mg by mouth Every Night.) 90 tablet 0   • triamterene-hydrochlorothiazide (MAXZIDE) 75-50 MG per tablet Take 1 tablet by mouth Daily.       Current Facility-Administered Medications   Medication Dose Route Frequency Provider Last Rate Last Admin   • Cyclophosphamide 1,490 mg in sodium chloride 0.9 % 250 mL chemo IVPB  750 mg/m2 (Treatment Plan Recorded) Intravenous Once Shannan Ramon MD       • dexamethasone (DECADRON) 12 mg in sodium chloride 0.9 % IVPB  12 mg Intravenous Once Shannan Ramon MD       • diphenhydrAMINE (BENADRYL) injection 50 mg  50 mg Intravenous PRN Shannan Ramon MD       • DOXOrubicin (ADRIAMYCIN) chemo injection 74 mg  37.5 mg/m2 (Treatment Plan Recorded) Intravenous Once Shannan Ramon MD       • famotidine (PEPCID) injection 20 mg  20 mg Intravenous PRN Shannan Ramon MD       • fosaprepitant (EMEND) 150 mg in sodium chloride 0.9 % 100 mL IVPB  150 mg Intravenous Once Shannan Ramon MD       • hydrocortisone sodium succinate (Solu-CORTEF) injection 100 mg  100 mg Intravenous PRN Shannan Ramon MD       • meperidine (DEMEROL) injection 25 mg  25 mg Intravenous Q20 Min PRN Shannan Ramon MD       • OLANZapine (zyPREXA) tablet 5 mg  5 mg Oral Once Shannan Ramon MD       • palonosetron (ALOXI) injection 0.25 mg  0.25 mg Intravenous Once Shannan Ramon MD       • pegfilgrastim (NEULASTA ONPRO) on-body injector 6 mg  6 mg Subcutaneous Once Shannan Ramon MD       • sodium chloride 0.9 % infusion 250 mL  250 mL Intravenous Once Shannan Ramon MD       • vinCRIStine (ONCOVIN) 2 mg in sodium chloride 0.9  "% 50 mL chemo IVPB  2 mg Intravenous Once Shannan Ramon MD           Glycemic Risk:   IDDM, Steriods    Weight:   Height: 66\"  Weight: 200 lbs.  Usual Body Weight: 187 lbs.   BMI: 32.41  Obese  Weight has increased ~13 lbs pounds over last 4 months    Oral Food Intake:  Regular Diet - No Restrictions  Compared to normal intake, current food intake is less than normal    Hydration Status:   How many 8 ounce glass of water of fluid do you drink per day?  4    Enteral Feeding:   N/A    Nutrition Symptoms:   Altered Apetite  Fatigue    Activity:   Not my normal self, but able to be up and about with fairly normal activities     reports that he has never smoked. He has never used smokeless tobacco. He reports that he does not drink alcohol and does not use drugs.    Evaluation of Nutritional Risk:   Patient identified to be at nutritional risk and/or for nutritional consultation; will follow patient through course of treatment.   Glycemic Risk  Nutrition Impact Symptoms  Patient Education        Electronically signed by:  Ruth Santa, NICOLAS  12:42 EST  "

## 2022-01-14 NOTE — PROGRESS NOTES
Met with patient today to introduce nurse navigator role. Pt. Denies any needs at this time. Quilt given to patient.

## 2022-01-17 ENCOUNTER — TELEPHONE (OUTPATIENT)
Dept: ONCOLOGY | Facility: CLINIC | Age: 87
End: 2022-01-17

## 2022-01-17 NOTE — TELEPHONE ENCOUNTER
Caller: Blane Dietz    Relationship: Self    Best call back number: [phone]    What was the call regarding: BLANE WAS RETURNING A CALL. HE IS NOT SURE WHO CALLED OR WHAT IT WAS ABOUT.    Do you require a callback:YES

## 2022-01-19 NOTE — TELEPHONE ENCOUNTER
Caller: Blane Dietz    Relationship to patient: Self    Best call back number:  [phone] IN THE AFTERNOON    Patient is needing: TO RETURN PHONE CALL. HUB SAW THIS CONVERSATION AND TRIED TO WARM TRANSFER 4X. HUB WAS UNSUCCESSFUL.

## 2022-01-19 NOTE — TELEPHONE ENCOUNTER
Returned patient call.  I advised I was following up on a message from infusion nurses last week that he asked if home health could come out and change his picc dressing there.  He said that is not what he wanted and wanted to know if our infusion nurses could take care of his surgical wound, but said that he doesn't need that now.  He said he is going to keep appts here for picc dressing changes weekly.

## 2022-01-20 PROBLEM — Z45.2 PICC (PERIPHERALLY INSERTED CENTRAL CATHETER) FLUSH: Status: ACTIVE | Noted: 2022-01-20

## 2022-01-20 RX ORDER — SODIUM CHLORIDE 0.9 % (FLUSH) 0.9 %
10 SYRINGE (ML) INJECTION AS NEEDED
Status: CANCELLED | OUTPATIENT
Start: 2022-01-21

## 2022-01-21 ENCOUNTER — APPOINTMENT (OUTPATIENT)
Dept: INFUSION THERAPY | Facility: HOSPITAL | Age: 87
End: 2022-01-21

## 2022-01-21 ENCOUNTER — INFUSION (OUTPATIENT)
Dept: ONCOLOGY | Facility: HOSPITAL | Age: 87
End: 2022-01-21

## 2022-01-21 VITALS
RESPIRATION RATE: 20 BRPM | TEMPERATURE: 97.7 F | HEART RATE: 112 BPM | SYSTOLIC BLOOD PRESSURE: 187 MMHG | DIASTOLIC BLOOD PRESSURE: 68 MMHG | OXYGEN SATURATION: 95 %

## 2022-01-21 DIAGNOSIS — C83.39 DIFFUSE LARGE B-CELL LYMPHOMA OF SOLID ORGAN EXCLUDING SPLEEN: ICD-10-CM

## 2022-01-21 DIAGNOSIS — Z45.2 PICC (PERIPHERALLY INSERTED CENTRAL CATHETER) FLUSH: Primary | ICD-10-CM

## 2022-01-21 PROCEDURE — 96523 IRRIG DRUG DELIVERY DEVICE: CPT

## 2022-01-21 RX ORDER — SODIUM CHLORIDE 0.9 % (FLUSH) 0.9 %
10 SYRINGE (ML) INJECTION AS NEEDED
Status: CANCELLED | OUTPATIENT
Start: 2022-01-21

## 2022-01-21 RX ORDER — SODIUM CHLORIDE 0.9 % (FLUSH) 0.9 %
10 SYRINGE (ML) INJECTION AS NEEDED
Status: DISCONTINUED | OUTPATIENT
Start: 2022-01-21 | End: 2022-01-21 | Stop reason: HOSPADM

## 2022-01-21 RX ADMIN — Medication 10 ML: at 13:34

## 2022-01-24 ENCOUNTER — TELEPHONE (OUTPATIENT)
Dept: ONCOLOGY | Facility: CLINIC | Age: 87
End: 2022-01-24

## 2022-01-24 NOTE — TELEPHONE ENCOUNTER
"Caller: CHEO    Relationship: Self    Best call back number: [phone]    What medications are you currently taking:   Current Outpatient Medications on File Prior to Visit   Medication Sig Dispense Refill   • allopurinol (ZYLOPRIM) 300 MG tablet TAKE 1 TABLET BY MOUTH ONE TIME A DAY 90 tablet 0   • amLODIPine (NORVASC) 5 MG tablet Take 1 tablet by mouth Daily. 30 tablet 1   • apixaban (ELIQUIS) 5 MG tablet tablet Take 1 tablet by mouth Every 12 (Twelve) Hours.     • BD Insulin Syringe U/F 31G X 5/16\" 0.5 ML misc      • Cholecalciferol (VITAMIN D3) 2000 UNITS capsule Take  by mouth 2 (two) times a day.     • Cinnamon 500 MG tablet Take 2,000 tablets by mouth Daily.     • Coenzyme Q10 100 MG tablet Take 2 tablets by mouth Daily.     • docusate sodium (Colace) 100 MG capsule Take 1 capsule by mouth Daily As Needed for Constipation. 30 capsule 1   • Dulaglutide (Trulicity) 1.5 MG/0.5ML solution pen-injector Inject 0.75 mg under the skin into the appropriate area as directed 1 (One) Time Per Week. 6 mL 0   • fenofibric acid (TRILIPIX) 135 MG capsule delayed-release delayed release capsule TAKE 1 CAPSULE BY MOUTH ONE TIME A DAY  (Patient taking differently: Take 135 mg by mouth Daily.) 30 capsule 2   • fluticasone (FLONASE) 50 MCG/ACT nasal spray 2 sprays into the nostril(s) as directed by provider Daily. 2 puffs each nostril (Patient taking differently: 2 sprays into the nostril(s) as directed by provider Daily As Needed for Rhinitis or Allergies. 2 puffs each nostril) 1 bottle 0   • HYDROcodone-acetaminophen (NORCO) 7.5-325 MG per tablet Take 1 tablet by mouth Every 6 (Six) Hours As Needed for Moderate Pain . 30 tablet 0   • Jardiance 10 MG tablet TAKE 1 TABLET BY MOUTH EVERY DAY  (Patient taking differently: Take 10 mg by mouth Daily.) 30 tablet 3   • l-methylfolate-algae-B6-B12 (METANX) 3-90.314-2-35 MG capsule capsule TAKE 1 CAPSULE BY MOUTH TWICE A DAY. (Patient taking differently: Take 1 capsule by mouth 2 (Two) " Times a Day.) 180 capsule 1   • Lantus 100 UNIT/ML injection Up to 50 units daily (Patient taking differently: Inject 45 Units under the skin into the appropriate area as directed Every Night. Up to 50 units daily) 20 mL 5   • LECITHIN PO Take 1,200 mg by mouth Daily.     • leuprolide (LUPRON) 22.5 MG injection Inject 22.5 mg into the appropriate muscle as directed by prescriber Every 3 (Three) Months.     • losartan-hydrochlorothiazide (HYZAAR) 100-25 MG per tablet Take 1 tablet by mouth Daily.     • metFORMIN ER (GLUCOPHAGE-XR) 500 MG 24 hr tablet Take 1 tablet by mouth 2 (Two) Times a Day. (Patient taking differently: Take 500 mg by mouth Daily With Breakfast.) 60 tablet 5   • methocarbamol (ROBAXIN) 500 MG tablet Take 1 tablet by mouth 3 (Three) Times a Day As Needed for Muscle Spasms. 30 tablet 0   • metoprolol succinate XL (TOPROL-XL) 100 MG 24 hr tablet TAKE 1 TABLET BY MOUTH EVERY DAY  (Patient taking differently: Take 100 mg by mouth Daily.) 90 tablet 0   • nitrofurantoin, macrocrystal-monohydrate, (Macrobid) 100 MG capsule Take 1 capsule by mouth 2 (Two) Times a Day. 14 capsule 0   • ondansetron (ZOFRAN) 8 MG tablet Take 1 tablet by mouth 3 (Three) Times a Day As Needed for Nausea or Vomiting. 30 tablet 5   • ondansetron (ZOFRAN) 8 MG tablet Take 1 tablet by mouth 3 (Three) Times a Day As Needed for Nausea or Vomiting. 30 tablet 5   • polyethylene glycol (MIRALAX) 17 GM/SCOOP powder Dissolve 17 grams (1 capful) in 8 ounces of water and drink Daily. 238 g 0   • pravastatin (PRAVACHOL) 40 MG tablet TAKE ONE TABLET BY MOUTH EVERY NIGHT AT BEDTIME (Patient taking differently: Take 40 mg by mouth Every Night.) 90 tablet 0   • triamterene-hydrochlorothiazide (MAXZIDE) 75-50 MG per tablet Take 1 tablet by mouth Daily.       No current facility-administered medications on file prior to visit.      Which medication are you concerned about: PREDNISONE - WHICH IS NOT ON LIST    Who prescribed you this medication:   LIONIN    What are your concerns: JUST HAS QUESTIONS.

## 2022-01-24 NOTE — TELEPHONE ENCOUNTER
Called patient back to discuss his question.  He had questions about his prednisone when he will start back and how long to take.  REviewed with him he will start it back on 1st day of chemo and take it for 5 days.  He verbalized understanding.

## 2022-01-28 ENCOUNTER — INFUSION (OUTPATIENT)
Dept: ONCOLOGY | Facility: HOSPITAL | Age: 87
End: 2022-01-28

## 2022-01-28 VITALS
SYSTOLIC BLOOD PRESSURE: 176 MMHG | DIASTOLIC BLOOD PRESSURE: 72 MMHG | OXYGEN SATURATION: 97 % | TEMPERATURE: 98.2 F | RESPIRATION RATE: 18 BRPM | HEART RATE: 58 BPM

## 2022-01-28 DIAGNOSIS — Z45.2 PICC (PERIPHERALLY INSERTED CENTRAL CATHETER) FLUSH: Primary | ICD-10-CM

## 2022-01-28 DIAGNOSIS — C83.39 DIFFUSE LARGE B-CELL LYMPHOMA OF SOLID ORGAN EXCLUDING SPLEEN: ICD-10-CM

## 2022-01-28 PROCEDURE — 96523 IRRIG DRUG DELIVERY DEVICE: CPT

## 2022-01-28 RX ORDER — SODIUM CHLORIDE 0.9 % (FLUSH) 0.9 %
10 SYRINGE (ML) INJECTION AS NEEDED
Status: CANCELLED | OUTPATIENT
Start: 2022-01-28

## 2022-01-28 RX ORDER — SODIUM CHLORIDE 0.9 % (FLUSH) 0.9 %
10 SYRINGE (ML) INJECTION AS NEEDED
Status: DISCONTINUED | OUTPATIENT
Start: 2022-01-28 | End: 2022-01-28 | Stop reason: HOSPADM

## 2022-01-28 RX ADMIN — SODIUM CHLORIDE, PRESERVATIVE FREE 10 ML: 5 INJECTION INTRAVENOUS at 14:20

## 2022-02-02 ENCOUNTER — OFFICE VISIT (OUTPATIENT)
Dept: ONCOLOGY | Facility: CLINIC | Age: 87
End: 2022-02-02

## 2022-02-02 ENCOUNTER — INFUSION (OUTPATIENT)
Dept: ONCOLOGY | Facility: HOSPITAL | Age: 87
End: 2022-02-02

## 2022-02-02 VITALS
HEIGHT: 66 IN | BODY MASS INDEX: 31.34 KG/M2 | TEMPERATURE: 98.2 F | HEART RATE: 85 BPM | RESPIRATION RATE: 20 BRPM | DIASTOLIC BLOOD PRESSURE: 98 MMHG | SYSTOLIC BLOOD PRESSURE: 156 MMHG | OXYGEN SATURATION: 98 % | WEIGHT: 195 LBS

## 2022-02-02 DIAGNOSIS — C83.39 DIFFUSE LARGE B-CELL LYMPHOMA OF SOLID ORGAN EXCLUDING SPLEEN: ICD-10-CM

## 2022-02-02 DIAGNOSIS — Z45.2 PICC (PERIPHERALLY INSERTED CENTRAL CATHETER) FLUSH: Primary | ICD-10-CM

## 2022-02-02 DIAGNOSIS — C61 PROSTATE CANCER: ICD-10-CM

## 2022-02-02 DIAGNOSIS — C83.39 DIFFUSE LARGE B-CELL LYMPHOMA OF SOLID ORGAN EXCLUDING SPLEEN: Primary | ICD-10-CM

## 2022-02-02 PROCEDURE — 99215 OFFICE O/P EST HI 40 MIN: CPT | Performed by: INTERNAL MEDICINE

## 2022-02-02 PROCEDURE — 96523 IRRIG DRUG DELIVERY DEVICE: CPT

## 2022-02-02 RX ORDER — DIPHENHYDRAMINE HYDROCHLORIDE 50 MG/ML
50 INJECTION INTRAMUSCULAR; INTRAVENOUS AS NEEDED
Status: CANCELLED | OUTPATIENT
Start: 2022-03-02

## 2022-02-02 RX ORDER — DOXORUBICIN HYDROCHLORIDE 2 MG/ML
37.5 INJECTION, SOLUTION INTRAVENOUS ONCE
Status: CANCELLED | OUTPATIENT
Start: 2022-03-02

## 2022-02-02 RX ORDER — OLANZAPINE 5 MG/1
5 TABLET ORAL ONCE
Status: CANCELLED | OUTPATIENT
Start: 2022-03-02

## 2022-02-02 RX ORDER — SODIUM CHLORIDE 0.9 % (FLUSH) 0.9 %
10 SYRINGE (ML) INJECTION AS NEEDED
Status: CANCELLED | OUTPATIENT
Start: 2022-02-02

## 2022-02-02 RX ORDER — ACETAMINOPHEN 325 MG/1
650 TABLET ORAL ONCE
Status: CANCELLED | OUTPATIENT
Start: 2022-03-02

## 2022-02-02 RX ORDER — FAMOTIDINE 10 MG/ML
20 INJECTION, SOLUTION INTRAVENOUS AS NEEDED
Status: CANCELLED | OUTPATIENT
Start: 2022-03-02

## 2022-02-02 RX ORDER — SODIUM CHLORIDE 0.9 % (FLUSH) 0.9 %
10 SYRINGE (ML) INJECTION AS NEEDED
Status: DISCONTINUED | OUTPATIENT
Start: 2022-02-02 | End: 2022-02-02 | Stop reason: HOSPADM

## 2022-02-02 RX ORDER — PALONOSETRON 0.05 MG/ML
0.25 INJECTION, SOLUTION INTRAVENOUS ONCE
Status: CANCELLED | OUTPATIENT
Start: 2022-03-02

## 2022-02-02 RX ORDER — SODIUM CHLORIDE 9 MG/ML
250 INJECTION, SOLUTION INTRAVENOUS ONCE
Status: CANCELLED | OUTPATIENT
Start: 2022-03-02

## 2022-02-02 RX ORDER — PREDNISONE 50 MG/1
50 TABLET ORAL 2 TIMES DAILY
COMMUNITY
Start: 2022-01-25 | End: 2022-04-08

## 2022-02-02 RX ORDER — MEPERIDINE HYDROCHLORIDE 25 MG/ML
25 INJECTION INTRAMUSCULAR; INTRAVENOUS; SUBCUTANEOUS
Status: CANCELLED | OUTPATIENT
Start: 2022-03-02

## 2022-02-02 RX ADMIN — SODIUM CHLORIDE, PRESERVATIVE FREE 10 ML: 5 INJECTION INTRAVENOUS at 15:57

## 2022-02-02 NOTE — PROGRESS NOTES
DATE OF VISIT: 2/2/2022     REASON FOR VISIT:   1. Prostate cancer. Presented with stage I, [id number], PSA at diagnosis 3 status  post prostatectomy in 2000.  2. Relapsed disease with rise in PSA gradually over the last couple of years,  most recent PSA 7.1 ng/mL on 01/25/2016.   3.  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 The patient never received adjuvant treatments,  neither radiation, GnRH or antiandrogen treatment. The patient's PSA started  to go up gradually. Patient was referred to me on  06/13/2014 and I did restaging workup that came back negative including bone  scan, CAT scans chest, abdomen and pelvis.  He was started on Eligard when his PSA increased above 10.  The patient had new diagnosis of right kidney diffuse large B-cell lymphoma treated with radical nephrectomy followed by R-CHOP chemotherapy started January 14, 2022.  Patient is here today in scheduled followup visit with treatment cycle #2.      SUBJECTIVE: Mr. Dietz is here today by himself.  He was able to tolerate his chemotherapy with multiple side effects.  He had nausea but no vomiting.  He did complain of fatigue.  He denied any fever chills night sweats.    REVIEW OF SYSTEMS: All the other 9 systems are reviewed by me and negative  except what is mentioned in HPI and subjective.      PAST MEDICAL HISTORY/SOCIAL HISTORY/FAMILY HISTORY: Unchanged from my prior  documentation on 06/13/2014.       Current Outpatient Medications:   •  allopurinol (ZYLOPRIM) 300 MG tablet, TAKE 1 TABLET BY MOUTH ONE TIME A DAY, Disp: 90 tablet, Rfl: 0  •  amLODIPine (NORVASC) 5 MG tablet, Take 1 tablet by mouth Daily., Disp: 30 tablet, Rfl: 1  •  apixaban (ELIQUIS) 5 MG tablet tablet, Take 1 tablet by mouth Every 12 (Twelve) Hours., Disp: , Rfl:   •  BD Insulin  "Syringe U/F 31G X 5/16\" 0.5 ML misc, , Disp: , Rfl:   •  Cholecalciferol (VITAMIN D3) 2000 UNITS capsule, Take  by mouth 2 (two) times a day., Disp: , Rfl:   •  Cinnamon 500 MG tablet, Take 2,000 tablets by mouth Daily., Disp: , Rfl:   •  Coenzyme Q10 100 MG tablet, Take 2 tablets by mouth Daily., Disp: , Rfl:   •  docusate sodium (Colace) 100 MG capsule, Take 1 capsule by mouth Daily As Needed for Constipation., Disp: 30 capsule, Rfl: 1  •  Dulaglutide (Trulicity) 1.5 MG/0.5ML solution pen-injector, Inject 0.75 mg under the skin into the appropriate area as directed 1 (One) Time Per Week., Disp: 6 mL, Rfl: 0  •  fenofibric acid (TRILIPIX) 135 MG capsule delayed-release delayed release capsule, TAKE 1 CAPSULE BY MOUTH ONE TIME A DAY  (Patient taking differently: Take 135 mg by mouth Daily.), Disp: 30 capsule, Rfl: 2  •  fluticasone (FLONASE) 50 MCG/ACT nasal spray, 2 sprays into the nostril(s) as directed by provider Daily. 2 puffs each nostril (Patient taking differently: 2 sprays into the nostril(s) as directed by provider Daily As Needed for Rhinitis or Allergies. 2 puffs each nostril), Disp: 1 bottle, Rfl: 0  •  HYDROcodone-acetaminophen (NORCO) 7.5-325 MG per tablet, Take 1 tablet by mouth Every 6 (Six) Hours As Needed for Moderate Pain ., Disp: 30 tablet, Rfl: 0  •  Jardiance 10 MG tablet, TAKE 1 TABLET BY MOUTH EVERY DAY  (Patient taking differently: Take 10 mg by mouth Daily.), Disp: 30 tablet, Rfl: 3  •  l-methylfolate-algae-B6-B12 (METANX) 3-90.314-2-35 MG capsule capsule, TAKE 1 CAPSULE BY MOUTH TWICE A DAY. (Patient taking differently: Take 1 capsule by mouth 2 (Two) Times a Day.), Disp: 180 capsule, Rfl: 1  •  Lantus 100 UNIT/ML injection, Up to 50 units daily (Patient taking differently: Inject 45 Units under the skin into the appropriate area as directed Every Night. Up to 50 units daily), Disp: 20 mL, Rfl: 5  •  LECITHIN PO, Take 1,200 mg by mouth Daily., Disp: , Rfl:   •  leuprolide (LUPRON) 22.5 MG " "injection, Inject 22.5 mg into the appropriate muscle as directed by prescriber Every 3 (Three) Months., Disp: , Rfl:   •  losartan-hydrochlorothiazide (HYZAAR) 100-25 MG per tablet, Take 1 tablet by mouth Daily., Disp: , Rfl:   •  metFORMIN ER (GLUCOPHAGE-XR) 500 MG 24 hr tablet, Take 1 tablet by mouth 2 (Two) Times a Day. (Patient taking differently: Take 500 mg by mouth Daily With Breakfast.), Disp: 60 tablet, Rfl: 5  •  methocarbamol (ROBAXIN) 500 MG tablet, Take 1 tablet by mouth 3 (Three) Times a Day As Needed for Muscle Spasms., Disp: 30 tablet, Rfl: 0  •  metoprolol succinate XL (TOPROL-XL) 100 MG 24 hr tablet, TAKE 1 TABLET BY MOUTH EVERY DAY  (Patient taking differently: Take 100 mg by mouth Daily.), Disp: 90 tablet, Rfl: 0  •  ondansetron (ZOFRAN) 8 MG tablet, Take 1 tablet by mouth 3 (Three) Times a Day As Needed for Nausea or Vomiting., Disp: 30 tablet, Rfl: 5  •  polyethylene glycol (MIRALAX) 17 GM/SCOOP powder, Dissolve 17 grams (1 capful) in 8 ounces of water and drink Daily., Disp: 238 g, Rfl: 0  •  pravastatin (PRAVACHOL) 40 MG tablet, TAKE ONE TABLET BY MOUTH EVERY NIGHT AT BEDTIME (Patient taking differently: Take 40 mg by mouth Every Night.), Disp: 90 tablet, Rfl: 0  •  predniSONE (DELTASONE) 50 MG tablet, Take 50 mg by mouth 2 (Two) Times a Day. Take 100mg daily for 5 days with treatment, Disp: , Rfl:   •  triamterene-hydrochlorothiazide (MAXZIDE) 75-50 MG per tablet, Take 1 tablet by mouth Daily., Disp: , Rfl:     PHYSICAL EXAMINATION:   /98   Pulse 85   Temp 98.2 °F (36.8 °C) (Temporal)   Resp 20   Ht 167.6 cm (66\")   Wt 88.5 kg (195 lb)   SpO2 98%   BMI 31.47 kg/m²   ECOG1  GENERAL: Age appropriate. No acute distress.   HEENT: Head atraumatic, normocephalic.   NECK: Supple. No JVD. No lymphadenopathy.   LUNGS: Clear to auscultation bilaterally. No wheezing. No rhonchi.   HEART: Regular rate and rhythm. S1, S2, no murmurs.   ABDOMEN: Soft, nontender, nondistended. Bowel sounds " positive. No  hepatosplenomegaly.   EXTREMITIES: No clubbing, cyanosis, or edema.   SKIN: No rashes. No purpura.   NEUROLOGIC: Awake and oriented x3. Strength 5 out of 5 in all muscle groups.     No visits with results within 2 Week(s) from this visit.   Latest known visit with results is:   Hospital Outpatient Visit on 01/12/2022   Component Date Value Ref Range Status   • PSA 01/12/2022 4.740* 0.000 - 4.000 ng/mL Final   • Hep B Core Total Ab 01/12/2022 Negative  Negative Final   • Hepatitis B Surface Ag 01/12/2022 Non-Reactive  Non-Reactive Final   • Hep B S Ab 01/12/2022 Non-Reactive   Final   • Glucose 01/12/2022 166* 65 - 99 mg/dL Final   • BUN 01/12/2022 22  8 - 23 mg/dL Final   • Creatinine 01/12/2022 1.16  0.76 - 1.27 mg/dL Final   • Sodium 01/12/2022 138  136 - 145 mmol/L Final   • Potassium 01/12/2022 4.4  3.5 - 5.2 mmol/L Final   • Chloride 01/12/2022 103  98 - 107 mmol/L Final   • CO2 01/12/2022 23.4  22.0 - 29.0 mmol/L Final   • Calcium 01/12/2022 9.3  8.6 - 10.5 mg/dL Final   • Total Protein 01/12/2022 7.1  6.0 - 8.5 g/dL Final   • Albumin 01/12/2022 4.20  3.50 - 5.20 g/dL Final   • ALT (SGPT) 01/12/2022 49* 1 - 41 U/L Final   • AST (SGOT) 01/12/2022 44* 1 - 40 U/L Final   • Alkaline Phosphatase 01/12/2022 111  39 - 117 U/L Final   • Total Bilirubin 01/12/2022 0.5  0.0 - 1.2 mg/dL Final   • eGFR Non African Amer 01/12/2022 60* >60 mL/min/1.73 Final   • Globulin 01/12/2022 2.9  gm/dL Final   • A/G Ratio 01/12/2022 1.4  g/dL Final   • BUN/Creatinine Ratio 01/12/2022 19.0  7.0 - 25.0 Final   • Anion Gap 01/12/2022 11.6  5.0 - 15.0 mmol/L Final   • WBC 01/12/2022 8.30  3.40 - 10.80 10*3/mm3 Final   • RBC 01/12/2022 4.51  4.14 - 5.80 10*6/mm3 Final   • Hemoglobin 01/12/2022 12.9* 13.0 - 17.7 g/dL Final   • Hematocrit 01/12/2022 39.8  37.5 - 51.0 % Final   • MCV 01/12/2022 88.2  79.0 - 97.0 fL Final   • MCH 01/12/2022 28.6  26.6 - 33.0 pg Final   • MCHC 01/12/2022 32.4  31.5 - 35.7 g/dL Final   • RDW  01/12/2022 14.5  12.3 - 15.4 % Final   • RDW-SD 01/12/2022 46.4  37.0 - 54.0 fl Final   • MPV 01/12/2022 9.8  6.0 - 12.0 fL Final   • Platelets 01/12/2022 149  140 - 450 10*3/mm3 Final   • Neutrophil % 01/12/2022 64.4  42.7 - 76.0 % Final   • Lymphocyte % 01/12/2022 17.0* 19.6 - 45.3 % Final   • Monocyte % 01/12/2022 7.8  5.0 - 12.0 % Final   • Eosinophil % 01/12/2022 4.7  0.3 - 6.2 % Final   • Basophil % 01/12/2022 2.2* 0.0 - 1.5 % Final   • Immature Grans % 01/12/2022 3.9* 0.0 - 0.5 % Final   • Neutrophils, Absolute 01/12/2022 5.35  1.70 - 7.00 10*3/mm3 Final   • Lymphocytes, Absolute 01/12/2022 1.41  0.70 - 3.10 10*3/mm3 Final   • Monocytes, Absolute 01/12/2022 0.65  0.10 - 0.90 10*3/mm3 Final   • Eosinophils, Absolute 01/12/2022 0.39  0.00 - 0.40 10*3/mm3 Final   • Basophils, Absolute 01/12/2022 0.18  0.00 - 0.20 10*3/mm3 Final   • Immature Grans, Absolute 01/12/2022 0.32* 0.00 - 0.05 10*3/mm3 Final   • nRBC 01/12/2022 0.0  0.0 - 0.2 /100 WBC Final        ASSESSMENT: The patient is a very pleasant 80-year-old gentleman with relapsed  prostate cancer.     PROBLEM LIST:  1. Prostate cancer, initially presented as [id number] status post radical  prostatectomy 2000.  A.  Patient is on Lupron plus Prolia  B.  Tried to add apalutamide second rising PSA patient declined.  2.  Right-sided pulmonary embolisms:  A.  Currently on Eliquis twice a day  3.  Type 2 diabetes  4. Hhypertension  5. Hypercholesterolemia.  6.  Right subsegmental PE:  A. Started on Eliquis twice a day March 2021  7.  Osteopenia  8.  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  Status post right nephrectomy December 7, 2021  B.  Started adjuvant chemotherapy R-CHOP January 14, 2022, status post 1 cycle    PLAN:  1.  I we will proceed with chemotherapy using R-CHOP once every 3 weeks cycle #2.  2.  The patient will follow up with me in 3 weeks for the third and last cycle.  3.  I will monitor the patient blood work including blood  counts kidney function liver function and lites.  4.  I reassured the patient his repeated cardiac echo revealed stable ejection fraction.  I will do another echo down the road on as-needed basis..  5.  We discussed the role of intrathecal chemotherapy.  Patients who have renal lymphoma are at high risk of CNS involvement.  I do recommend intrathecal chemotherapy prophylactically using methotrexate 12 mg day 14 after each cycle.  The patient is not interested in that at this point given his comorbidities, difficulties getting to [place] for this, and early stage disease.  6.  I will continue PICC line care.  7.   We discussed the potential risks and side effects of R CHOP chemotherapy including neutropenia, alopecia, nausea and vomiting, fatigue, neuropathy, cardiomyopathy, constipation, infusion reaction, and a small risk of myelodysplasia.  8.  I will continue Eligard once every 3 months.  The patient is not interested in adding apalutamide for his prostate cancer treatment at this point.  9.  He will continue with [place] and Trulicity subcu for type 2 diabetes.  10.  We'll continue pravastatin for hypercholesterolemia  11.  I'll continue the patient on Prolia 60 mg subcutaneous every 6 month.  His next dose will be due March 2022.  We'll continue calcium with vitamin D daily.  12.  We will continue Eliquis 2.5 mg twice a day.  I will stop his anticoagulation March 2022 this will be a full year of treatment.  13.  I will continue to monitor the patient blood work including blood counts kidney function liver function electrolytes and serum PSA.  I shared with the patient his PSA has been slowly increasing.  14.  We will continue Lupron every 3 months, his next treatment is due February 23, 2021.    Total time of patient care including preparation of patient chart prior to arrival, face-to-face with patient and following visit spent in reviewing records, lab results, imaging studies, discussion with patient, and  documentation/charting was 40 minutes         Shannan Ramon MD    2/2/2022

## 2022-02-07 ENCOUNTER — INFUSION (OUTPATIENT)
Dept: ONCOLOGY | Facility: HOSPITAL | Age: 87
End: 2022-02-07

## 2022-02-07 VITALS
HEART RATE: 89 BPM | DIASTOLIC BLOOD PRESSURE: 78 MMHG | WEIGHT: 196 LBS | BODY MASS INDEX: 31.64 KG/M2 | SYSTOLIC BLOOD PRESSURE: 146 MMHG | TEMPERATURE: 98.4 F

## 2022-02-07 DIAGNOSIS — Z45.2 PICC (PERIPHERALLY INSERTED CENTRAL CATHETER) FLUSH: ICD-10-CM

## 2022-02-07 DIAGNOSIS — C61 PROSTATE CANCER: Primary | ICD-10-CM

## 2022-02-07 DIAGNOSIS — C83.39 DIFFUSE LARGE B-CELL LYMPHOMA OF SOLID ORGAN EXCLUDING SPLEEN: ICD-10-CM

## 2022-02-07 LAB
ALBUMIN SERPL-MCNC: 4 G/DL (ref 3.5–5.2)
ALBUMIN/GLOB SERPL: 1.7 G/DL
ALP SERPL-CCNC: 103 U/L (ref 39–117)
ALT SERPL W P-5'-P-CCNC: 28 U/L (ref 1–41)
ANION GAP SERPL CALCULATED.3IONS-SCNC: 11 MMOL/L (ref 5–15)
AST SERPL-CCNC: 31 U/L (ref 1–40)
BASOPHILS # BLD AUTO: 0.08 10*3/MM3 (ref 0–0.2)
BASOPHILS NFR BLD AUTO: 1.6 % (ref 0–1.5)
BILIRUB SERPL-MCNC: 0.7 MG/DL (ref 0–1.2)
BUN SERPL-MCNC: 24 MG/DL (ref 8–23)
BUN/CREAT SERPL: 19 (ref 7–25)
CALCIUM SPEC-SCNC: 9.3 MG/DL (ref 8.6–10.5)
CHLORIDE SERPL-SCNC: 102 MMOL/L (ref 98–107)
CO2 SERPL-SCNC: 25 MMOL/L (ref 22–29)
CREAT SERPL-MCNC: 1.26 MG/DL (ref 0.76–1.27)
DEPRECATED RDW RBC AUTO: 50.2 FL (ref 37–54)
EOSINOPHIL # BLD AUTO: 0.03 10*3/MM3 (ref 0–0.4)
EOSINOPHIL NFR BLD AUTO: 0.6 % (ref 0.3–6.2)
ERYTHROCYTE [DISTWIDTH] IN BLOOD BY AUTOMATED COUNT: 15.2 % (ref 12.3–15.4)
GFR SERPL CREATININE-BSD FRML MDRD: 55 ML/MIN/1.73
GLOBULIN UR ELPH-MCNC: 2.4 GM/DL
GLUCOSE SERPL-MCNC: 273 MG/DL (ref 65–99)
HCT VFR BLD AUTO: 36 % (ref 37.5–51)
HGB BLD-MCNC: 11.5 G/DL (ref 13–17.7)
IMM GRANULOCYTES # BLD AUTO: 0.03 10*3/MM3 (ref 0–0.05)
IMM GRANULOCYTES NFR BLD AUTO: 0.6 % (ref 0–0.5)
LYMPHOCYTES # BLD AUTO: 0.7 10*3/MM3 (ref 0.7–3.1)
LYMPHOCYTES NFR BLD AUTO: 14.4 % (ref 19.6–45.3)
MCH RBC QN AUTO: 28.8 PG (ref 26.6–33)
MCHC RBC AUTO-ENTMCNC: 31.9 G/DL (ref 31.5–35.7)
MCV RBC AUTO: 90 FL (ref 79–97)
MONOCYTES # BLD AUTO: 0.69 10*3/MM3 (ref 0.1–0.9)
MONOCYTES NFR BLD AUTO: 14.2 % (ref 5–12)
NEUTROPHILS NFR BLD AUTO: 3.32 10*3/MM3 (ref 1.7–7)
NEUTROPHILS NFR BLD AUTO: 68.6 % (ref 42.7–76)
NRBC BLD AUTO-RTO: 0 /100 WBC (ref 0–0.2)
PLATELET # BLD AUTO: 181 10*3/MM3 (ref 140–450)
PMV BLD AUTO: 10.1 FL (ref 6–12)
POTASSIUM SERPL-SCNC: 4.3 MMOL/L (ref 3.5–5.2)
PROT SERPL-MCNC: 6.4 G/DL (ref 6–8.5)
RBC # BLD AUTO: 4 10*6/MM3 (ref 4.14–5.8)
SODIUM SERPL-SCNC: 138 MMOL/L (ref 136–145)
WBC NRBC COR # BLD: 4.85 10*3/MM3 (ref 3.4–10.8)

## 2022-02-07 PROCEDURE — 25010000002 PEGFILGRASTIM 6 MG/0.6ML PREFILLED SYRINGE KIT: Performed by: INTERNAL MEDICINE

## 2022-02-07 PROCEDURE — 96377 APPLICATON ON-BODY INJECTOR: CPT

## 2022-02-07 PROCEDURE — 25010000002 DOXORUBICIN PER 10 MG: Performed by: INTERNAL MEDICINE

## 2022-02-07 PROCEDURE — 85025 COMPLETE CBC W/AUTO DIFF WBC: CPT

## 2022-02-07 PROCEDURE — 25010000002 VINCRISTINE PER 1 MG: Performed by: INTERNAL MEDICINE

## 2022-02-07 PROCEDURE — 96415 CHEMO IV INFUSION ADDL HR: CPT

## 2022-02-07 PROCEDURE — 25010000002 CYCLOPHOSPHAMIDE 500 MG/2.5ML SOLUTION 2.5 ML VIAL: Performed by: INTERNAL MEDICINE

## 2022-02-07 PROCEDURE — 96367 TX/PROPH/DG ADDL SEQ IV INF: CPT

## 2022-02-07 PROCEDURE — 25010000002 RITUXIMAB 10 MG/ML SOLUTION 50 ML VIAL: Performed by: INTERNAL MEDICINE

## 2022-02-07 PROCEDURE — 25010000002 DIPHENHYDRAMINE PER 50 MG: Performed by: INTERNAL MEDICINE

## 2022-02-07 PROCEDURE — 96417 CHEMO IV INFUS EACH ADDL SEQ: CPT

## 2022-02-07 PROCEDURE — 80053 COMPREHEN METABOLIC PANEL: CPT

## 2022-02-07 PROCEDURE — 25010000002 CYCLOPHOSPHAMIDE 1 GM/5ML SOLUTION 5 ML VIAL: Performed by: INTERNAL MEDICINE

## 2022-02-07 PROCEDURE — 96375 TX/PRO/DX INJ NEW DRUG ADDON: CPT

## 2022-02-07 PROCEDURE — 25010000002 PALONOSETRON 0.25 MG/5ML SOLUTION PREFILLED SYRINGE: Performed by: INTERNAL MEDICINE

## 2022-02-07 PROCEDURE — 36415 COLL VENOUS BLD VENIPUNCTURE: CPT

## 2022-02-07 PROCEDURE — 25010000002 DEXAMETHASONE SODIUM PHOSPHATE 20 MG/5ML SOLUTION: Performed by: INTERNAL MEDICINE

## 2022-02-07 PROCEDURE — 96413 CHEMO IV INFUSION 1 HR: CPT

## 2022-02-07 PROCEDURE — 96411 CHEMO IV PUSH ADDL DRUG: CPT

## 2022-02-07 PROCEDURE — 25010000002 RITUXIMAB 10 MG/ML SOLUTION 10 ML VIAL: Performed by: INTERNAL MEDICINE

## 2022-02-07 PROCEDURE — 25010000002 FOSAPREPITANT PER 1 MG: Performed by: INTERNAL MEDICINE

## 2022-02-07 RX ORDER — SODIUM CHLORIDE 9 MG/ML
250 INJECTION, SOLUTION INTRAVENOUS ONCE
Status: DISCONTINUED | OUTPATIENT
Start: 2022-02-07 | End: 2022-02-07 | Stop reason: HOSPADM

## 2022-02-07 RX ORDER — SODIUM CHLORIDE 0.9 % (FLUSH) 0.9 %
10 SYRINGE (ML) INJECTION AS NEEDED
Status: CANCELLED | OUTPATIENT
Start: 2022-02-07

## 2022-02-07 RX ORDER — SODIUM CHLORIDE 0.9 % (FLUSH) 0.9 %
10 SYRINGE (ML) INJECTION AS NEEDED
Status: DISCONTINUED | OUTPATIENT
Start: 2022-02-07 | End: 2022-02-07 | Stop reason: HOSPADM

## 2022-02-07 RX ORDER — PALONOSETRON 0.05 MG/ML
0.25 INJECTION, SOLUTION INTRAVENOUS ONCE
Status: COMPLETED | OUTPATIENT
Start: 2022-02-07 | End: 2022-02-07

## 2022-02-07 RX ORDER — ACETAMINOPHEN 325 MG/1
650 TABLET ORAL ONCE
Status: COMPLETED | OUTPATIENT
Start: 2022-02-07 | End: 2022-02-07

## 2022-02-07 RX ORDER — OLANZAPINE 5 MG/1
5 TABLET ORAL ONCE
Status: COMPLETED | OUTPATIENT
Start: 2022-02-07 | End: 2022-02-07

## 2022-02-07 RX ORDER — DOXORUBICIN HYDROCHLORIDE 2 MG/ML
37.5 INJECTION, SOLUTION INTRAVENOUS ONCE
Status: COMPLETED | OUTPATIENT
Start: 2022-02-07 | End: 2022-02-07

## 2022-02-07 RX ADMIN — OLANZAPINE 5 MG: 5 TABLET, FILM COATED ORAL at 12:16

## 2022-02-07 RX ADMIN — RITUXIMAB 750 MG: 10 INJECTION, SOLUTION INTRAVENOUS at 10:44

## 2022-02-07 RX ADMIN — DEXAMETHASONE SODIUM PHOSPHATE 12 MG: 4 INJECTION, SOLUTION INTRAMUSCULAR; INTRAVENOUS at 12:19

## 2022-02-07 RX ADMIN — ACETAMINOPHEN 650 MG: 325 TABLET ORAL at 10:23

## 2022-02-07 RX ADMIN — CYCLOPHOSPHAMIDE 1490 MG: 200 INJECTION, SOLUTION INTRAVENOUS at 13:31

## 2022-02-07 RX ADMIN — VINCRISTINE SULFATE 2 MG: 1 INJECTION, SOLUTION INTRAVENOUS at 13:10

## 2022-02-07 RX ADMIN — PALONOSETRON 0.25 MG: 0.25 INJECTION, SOLUTION INTRAVENOUS at 12:16

## 2022-02-07 RX ADMIN — PEGFILGRASTIM 6 MG: KIT SUBCUTANEOUS at 14:11

## 2022-02-07 RX ADMIN — FOSAPREPITANT 150 MG: 150 INJECTION, POWDER, LYOPHILIZED, FOR SOLUTION INTRAVENOUS at 12:18

## 2022-02-07 RX ADMIN — DIPHENHYDRAMINE HYDROCHLORIDE 50 MG: 50 INJECTION INTRAMUSCULAR; INTRAVENOUS at 10:23

## 2022-02-07 RX ADMIN — DOXORUBICIN HYDROCHLORIDE 74 MG: 2 INJECTION, SOLUTION INTRAVENOUS at 12:52

## 2022-02-07 NOTE — PATIENT INSTRUCTIONS
Take Onpro off (peel off like a bandaid) at _______ on Tuesday 2/8/2022 and discard in trash.     Take steroids as prescribed (1 in morning and 1 in evening) for 5 days. Today is day one, Tuesday is day two, Wednesday is day three, and so on.

## 2022-02-14 ENCOUNTER — INFUSION (OUTPATIENT)
Dept: ONCOLOGY | Facility: HOSPITAL | Age: 87
End: 2022-02-14

## 2022-02-14 PROCEDURE — G0463 HOSPITAL OUTPT CLINIC VISIT: HCPCS

## 2022-02-21 ENCOUNTER — INFUSION (OUTPATIENT)
Dept: ONCOLOGY | Facility: HOSPITAL | Age: 87
End: 2022-02-21

## 2022-02-21 VITALS
HEART RATE: 99 BPM | SYSTOLIC BLOOD PRESSURE: 140 MMHG | DIASTOLIC BLOOD PRESSURE: 79 MMHG | TEMPERATURE: 98.4 F | RESPIRATION RATE: 18 BRPM

## 2022-02-21 DIAGNOSIS — Z45.2 PICC (PERIPHERALLY INSERTED CENTRAL CATHETER) FLUSH: Primary | ICD-10-CM

## 2022-02-21 DIAGNOSIS — C83.39 DIFFUSE LARGE B-CELL LYMPHOMA OF SOLID ORGAN EXCLUDING SPLEEN: ICD-10-CM

## 2022-02-21 PROCEDURE — 96523 IRRIG DRUG DELIVERY DEVICE: CPT

## 2022-02-21 RX ORDER — SODIUM CHLORIDE 0.9 % (FLUSH) 0.9 %
10 SYRINGE (ML) INJECTION AS NEEDED
OUTPATIENT
Start: 2022-02-21

## 2022-02-21 RX ORDER — SODIUM CHLORIDE 0.9 % (FLUSH) 0.9 %
10 SYRINGE (ML) INJECTION AS NEEDED
Status: DISCONTINUED | OUTPATIENT
Start: 2022-02-21 | End: 2022-02-21 | Stop reason: HOSPADM

## 2022-02-21 RX ADMIN — SODIUM CHLORIDE, PRESERVATIVE FREE 10 ML: 5 INJECTION INTRAVENOUS at 13:55

## 2022-02-23 ENCOUNTER — OFFICE VISIT (OUTPATIENT)
Dept: ONCOLOGY | Facility: CLINIC | Age: 87
End: 2022-02-23

## 2022-02-23 VITALS
RESPIRATION RATE: 16 BRPM | HEART RATE: 109 BPM | HEIGHT: 66 IN | TEMPERATURE: 97.1 F | BODY MASS INDEX: 32.78 KG/M2 | OXYGEN SATURATION: 97 % | DIASTOLIC BLOOD PRESSURE: 71 MMHG | WEIGHT: 204 LBS | SYSTOLIC BLOOD PRESSURE: 155 MMHG

## 2022-02-23 DIAGNOSIS — C83.39 DIFFUSE LARGE B-CELL LYMPHOMA OF SOLID ORGAN EXCLUDING SPLEEN: Primary | ICD-10-CM

## 2022-02-23 DIAGNOSIS — R93.6 ABNORMAL FINDINGS ON DIAGNOSTIC IMAGING OF LIMBS: ICD-10-CM

## 2022-02-23 PROCEDURE — 99215 OFFICE O/P EST HI 40 MIN: CPT | Performed by: INTERNAL MEDICINE

## 2022-02-23 NOTE — PROGRESS NOTES
"DATE OF VISIT: 2/23/2022     REASON FOR VISIT:   1. Prostate cancer. Presented with stage I, [id number], PSA at diagnosis 3 status  post prostatectomy in 2000.  2. Relapsed disease with rise in PSA gradually over the last couple of years,  most recent PSA 7.1 ng/mL on 01/25/2016.   3.  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 The patient never received adjuvant treatments,  neither radiation, GnRH or antiandrogen treatment. The patient's PSA started  to go up gradually. Patient was referred to me on  06/13/2014 and I did restaging workup that came back negative including bone  scan, CAT scans chest, abdomen and pelvis.  He was started on Eligard when his PSA increased above 10.  The patient had new diagnosis of right kidney diffuse large B-cell lymphoma treated with radical nephrectomy followed by R-CHOP chemotherapy started January 14, 2022.  Patient is here today in scheduled followup visit with treatment cycle #3.      SUBJECTIVE: Mr. Dietz is here today by himself.  He has been able to tolerate treatment with multiple side effects.  He denies any fever chills night sweats.  He is complaining of fatigue.    REVIEW OF SYSTEMS: All the other 9 systems are reviewed by me and negative  except what is mentioned in HPI and subjective.      PAST MEDICAL HISTORY/SOCIAL HISTORY/FAMILY HISTORY: Unchanged from my prior  documentation on 06/13/2014.       Current Outpatient Medications:   •  allopurinol (ZYLOPRIM) 300 MG tablet, TAKE 1 TABLET BY MOUTH ONE TIME A DAY, Disp: 90 tablet, Rfl: 0  •  amLODIPine (NORVASC) 5 MG tablet, Take 1 tablet by mouth Daily., Disp: 30 tablet, Rfl: 1  •  apixaban (ELIQUIS) 5 MG tablet tablet, Take 1 tablet by mouth Every 12 (Twelve) Hours., Disp: , Rfl:   •  BD Insulin Syringe U/F 31G X 5/16\" 0.5 ML " misc, , Disp: , Rfl:   •  Cholecalciferol (VITAMIN D3) 2000 UNITS capsule, Take  by mouth 2 (two) times a day., Disp: , Rfl:   •  Cinnamon 500 MG tablet, Take 2,000 tablets by mouth Daily., Disp: , Rfl:   •  Coenzyme Q10 100 MG tablet, Take 2 tablets by mouth Daily., Disp: , Rfl:   •  docusate sodium (Colace) 100 MG capsule, Take 1 capsule by mouth Daily As Needed for Constipation., Disp: 30 capsule, Rfl: 1  •  Dulaglutide (Trulicity) 1.5 MG/0.5ML solution pen-injector, Inject 0.75 mg under the skin into the appropriate area as directed 1 (One) Time Per Week., Disp: 6 mL, Rfl: 0  •  fenofibric acid (TRILIPIX) 135 MG capsule delayed-release delayed release capsule, TAKE 1 CAPSULE BY MOUTH ONE TIME A DAY  (Patient taking differently: Take 135 mg by mouth Daily.), Disp: 30 capsule, Rfl: 2  •  fluticasone (FLONASE) 50 MCG/ACT nasal spray, 2 sprays into the nostril(s) as directed by provider Daily. 2 puffs each nostril (Patient taking differently: 2 sprays into the nostril(s) as directed by provider Daily As Needed for Rhinitis or Allergies. 2 puffs each nostril), Disp: 1 bottle, Rfl: 0  •  HYDROcodone-acetaminophen (NORCO) 7.5-325 MG per tablet, Take 1 tablet by mouth Every 6 (Six) Hours As Needed for Moderate Pain ., Disp: 30 tablet, Rfl: 0  •  Jardiance 10 MG tablet, TAKE 1 TABLET BY MOUTH EVERY DAY  (Patient taking differently: Take 10 mg by mouth Daily.), Disp: 30 tablet, Rfl: 3  •  l-methylfolate-algae-B6-B12 (METANX) 3-90.314-2-35 MG capsule capsule, TAKE 1 CAPSULE BY MOUTH TWICE A DAY. (Patient taking differently: Take 1 capsule by mouth 2 (Two) Times a Day.), Disp: 180 capsule, Rfl: 1  •  Lantus 100 UNIT/ML injection, Up to 50 units daily (Patient taking differently: Inject 45 Units under the skin into the appropriate area as directed Every Night. Up to 50 units daily), Disp: 20 mL, Rfl: 5  •  LECITHIN PO, Take 1,200 mg by mouth Daily., Disp: , Rfl:   •  leuprolide (LUPRON) 22.5 MG injection, Inject 22.5 mg into  "the appropriate muscle as directed by prescriber Every 3 (Three) Months., Disp: , Rfl:   •  losartan-hydrochlorothiazide (HYZAAR) 100-25 MG per tablet, Take 1 tablet by mouth Daily., Disp: , Rfl:   •  metFORMIN ER (GLUCOPHAGE-XR) 500 MG 24 hr tablet, Take 1 tablet by mouth 2 (Two) Times a Day. (Patient taking differently: Take 500 mg by mouth Daily With Breakfast.), Disp: 60 tablet, Rfl: 5  •  methocarbamol (ROBAXIN) 500 MG tablet, Take 1 tablet by mouth 3 (Three) Times a Day As Needed for Muscle Spasms., Disp: 30 tablet, Rfl: 0  •  metoprolol succinate XL (TOPROL-XL) 100 MG 24 hr tablet, TAKE 1 TABLET BY MOUTH EVERY DAY  (Patient taking differently: Take 100 mg by mouth Daily.), Disp: 90 tablet, Rfl: 0  •  ondansetron (ZOFRAN) 8 MG tablet, Take 1 tablet by mouth 3 (Three) Times a Day As Needed for Nausea or Vomiting., Disp: 30 tablet, Rfl: 5  •  polyethylene glycol (MIRALAX) 17 GM/SCOOP powder, Dissolve 17 grams (1 capful) in 8 ounces of water and drink Daily., Disp: 238 g, Rfl: 0  •  pravastatin (PRAVACHOL) 40 MG tablet, TAKE ONE TABLET BY MOUTH EVERY NIGHT AT BEDTIME (Patient taking differently: Take 40 mg by mouth Every Night.), Disp: 90 tablet, Rfl: 0  •  predniSONE (DELTASONE) 50 MG tablet, Take 50 mg by mouth 2 (Two) Times a Day. Take 100mg daily for 5 days with treatment, Disp: , Rfl:   •  triamterene-hydrochlorothiazide (MAXZIDE) 75-50 MG per tablet, Take 1 tablet by mouth Daily., Disp: , Rfl:     PHYSICAL EXAMINATION:   Ht 167.6 cm (66\")   BMI 31.64 kg/m²   ECOG1  GENERAL: Age appropriate. No acute distress.   HEENT: Head atraumatic, normocephalic.   NECK: Supple. No JVD. No lymphadenopathy.   LUNGS: Clear to auscultation bilaterally. No wheezing. No rhonchi.   HEART: Regular rate and rhythm. S1, S2, no murmurs.   ABDOMEN: Soft, nontender, nondistended. Bowel sounds positive. No  hepatosplenomegaly.   EXTREMITIES: No clubbing, cyanosis, or edema.   SKIN: No rashes. No purpura.   NEUROLOGIC: Awake and " oriented x3. Strength 5 out of 5 in all muscle groups.     No visits with results within 2 Week(s) from this visit.   Latest known visit with results is:   Infusion on 02/07/2022   Component Date Value Ref Range Status   • Glucose 02/07/2022 273* 65 - 99 mg/dL Final    Glucose >180, Hemoglobin A1C recommended.   • BUN 02/07/2022 24* 8 - 23 mg/dL Final   • Creatinine 02/07/2022 1.26  0.76 - 1.27 mg/dL Final   • Sodium 02/07/2022 138  136 - 145 mmol/L Final   • Potassium 02/07/2022 4.3  3.5 - 5.2 mmol/L Final   • Chloride 02/07/2022 102  98 - 107 mmol/L Final   • CO2 02/07/2022 25.0  22.0 - 29.0 mmol/L Final   • Calcium 02/07/2022 9.3  8.6 - 10.5 mg/dL Final   • Total Protein 02/07/2022 6.4  6.0 - 8.5 g/dL Final   • Albumin 02/07/2022 4.00  3.50 - 5.20 g/dL Final   • ALT (SGPT) 02/07/2022 28  1 - 41 U/L Final   • AST (SGOT) 02/07/2022 31  1 - 40 U/L Final   • Alkaline Phosphatase 02/07/2022 103  39 - 117 U/L Final   • Total Bilirubin 02/07/2022 0.7  0.0 - 1.2 mg/dL Final   • eGFR Non African Amer 02/07/2022 55* >60 mL/min/1.73 Final   • Globulin 02/07/2022 2.4  gm/dL Final   • A/G Ratio 02/07/2022 1.7  g/dL Final   • BUN/Creatinine Ratio 02/07/2022 19.0  7.0 - 25.0 Final   • Anion Gap 02/07/2022 11.0  5.0 - 15.0 mmol/L Final   • WBC 02/07/2022 4.85  3.40 - 10.80 10*3/mm3 Final   • RBC 02/07/2022 4.00* 4.14 - 5.80 10*6/mm3 Final   • Hemoglobin 02/07/2022 11.5* 13.0 - 17.7 g/dL Final   • Hematocrit 02/07/2022 36.0* 37.5 - 51.0 % Final   • MCV 02/07/2022 90.0  79.0 - 97.0 fL Final   • MCH 02/07/2022 28.8  26.6 - 33.0 pg Final   • MCHC 02/07/2022 31.9  31.5 - 35.7 g/dL Final   • RDW 02/07/2022 15.2  12.3 - 15.4 % Final   • RDW-SD 02/07/2022 50.2  37.0 - 54.0 fl Final   • MPV 02/07/2022 10.1  6.0 - 12.0 fL Final   • Platelets 02/07/2022 181  140 - 450 10*3/mm3 Final   • Neutrophil % 02/07/2022 68.6  42.7 - 76.0 % Final   • Lymphocyte % 02/07/2022 14.4* 19.6 - 45.3 % Final   • Monocyte % 02/07/2022 14.2* 5.0 - 12.0 % Final    • Eosinophil % 02/07/2022 0.6  0.3 - 6.2 % Final   • Basophil % 02/07/2022 1.6* 0.0 - 1.5 % Final   • Immature Grans % 02/07/2022 0.6* 0.0 - 0.5 % Final   • Neutrophils, Absolute 02/07/2022 3.32  1.70 - 7.00 10*3/mm3 Final   • Lymphocytes, Absolute 02/07/2022 0.70  0.70 - 3.10 10*3/mm3 Final   • Monocytes, Absolute 02/07/2022 0.69  0.10 - 0.90 10*3/mm3 Final   • Eosinophils, Absolute 02/07/2022 0.03  0.00 - 0.40 10*3/mm3 Final   • Basophils, Absolute 02/07/2022 0.08  0.00 - 0.20 10*3/mm3 Final   • Immature Grans, Absolute 02/07/2022 0.03  0.00 - 0.05 10*3/mm3 Final   • nRBC 02/07/2022 0.0  0.0 - 0.2 /100 WBC Final        ASSESSMENT: The patient is a very pleasant 80-year-old gentleman with relapsed  prostate cancer.     PROBLEM LIST:  1. Prostate cancer, initially presented as [id number] status post radical  prostatectomy 2000.  A.  Patient is on Lupron plus Prolia  B.  Tried to add apalutamide second rising PSA patient declined.  2.  Right-sided pulmonary embolisms:  A.  Currently on Eliquis twice a day  3.  Type 2 diabetes  4. Hhypertension  5. Hypercholesterolemia.  6.  Right subsegmental PE:  A. Started on Eliquis twice a day March 2021  7.  Osteopenia  8.  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  Status post right nephrectomy December 7, 2021  B.  Started adjuvant chemotherapy R-CHOP January 14, 2022, status post 2 cycles    PLAN:  1.  I we will proceed with chemotherapy using R-CHOP once every 3 weeks cycle #3.  2.  The patient will follow up with me in 3 weeks for the third and last cycle.  3.  I will monitor the patient blood work including blood counts kidney function liver function and lites.  4.  I reassured the patient his repeated cardiac echo revealed stable ejection fraction.  I will do another echo down the road on as-needed basis..  5.  We discussed the role of intrathecal chemotherapy.  Patients who have renal lymphoma are at high risk of CNS involvement.  I do recommend  intrathecal chemotherapy prophylactically using methotrexate 12 mg day 14 after each cycle.  The patient is not interested in that at this point given his comorbidities, difficulties getting to [place] for this, and early stage disease.  6.  I will continue PICC line care.  7.   We discussed the potential risks and side effects of R CHOP chemotherapy including neutropenia, alopecia, nausea and vomiting, fatigue, neuropathy, cardiomyopathy, constipation, infusion reaction, and a small risk of myelodysplasia.  8.  I will continue Eligard once every 3 months.  The patient is not interested in adding apalutamide for his prostate cancer treatment at this point.  9.  He will continue with [place] and Trulicity subcu for type 2 diabetes.  10.  We'll continue pravastatin for hypercholesterolemia  11.  I'll continue the patient on Prolia 60 mg subcutaneous every 6 month.  His next dose will be due March 2022.  We'll continue calcium with vitamin D daily.  12.  We will continue Eliquis 2.5 mg twice a day.  I will stop his anticoagulation March 2022 this will be a full year of treatment.  13.  I will continue to monitor the patient blood work including blood counts kidney function liver function electrolytes and serum PSA.  I shared with the patient his PSA has been slowly increasing.  14.  We will continue Lupron every 3 months, his next treatment is due on return.    Total time of patient care including preparation of patient chart prior to arrival, face-to-face with patient and following visit spent in reviewing records, lab results, imaging studies, discussion with patient, and documentation/charting was 40 minutes       Shannan Ramon MD    2/23/2022

## 2022-02-28 ENCOUNTER — INFUSION (OUTPATIENT)
Dept: ONCOLOGY | Facility: HOSPITAL | Age: 87
End: 2022-02-28

## 2022-02-28 VITALS
DIASTOLIC BLOOD PRESSURE: 79 MMHG | HEART RATE: 85 BPM | BODY MASS INDEX: 32.88 KG/M2 | WEIGHT: 203.7 LBS | SYSTOLIC BLOOD PRESSURE: 169 MMHG | TEMPERATURE: 97.1 F

## 2022-02-28 DIAGNOSIS — C61 PROSTATE CANCER: Primary | ICD-10-CM

## 2022-02-28 DIAGNOSIS — C83.39 DIFFUSE LARGE B-CELL LYMPHOMA OF SOLID ORGAN EXCLUDING SPLEEN: ICD-10-CM

## 2022-02-28 LAB
ALBUMIN SERPL-MCNC: 3.9 G/DL (ref 3.5–5.2)
ALBUMIN/GLOB SERPL: 1.5 G/DL
ALP SERPL-CCNC: 148 U/L (ref 39–117)
ALT SERPL W P-5'-P-CCNC: 36 U/L (ref 1–41)
ANION GAP SERPL CALCULATED.3IONS-SCNC: 11.8 MMOL/L (ref 5–15)
AST SERPL-CCNC: 33 U/L (ref 1–40)
BASOPHILS # BLD AUTO: 0.05 10*3/MM3 (ref 0–0.2)
BASOPHILS NFR BLD AUTO: 1.1 % (ref 0–1.5)
BILIRUB SERPL-MCNC: 0.7 MG/DL (ref 0–1.2)
BUN SERPL-MCNC: 16 MG/DL (ref 8–23)
BUN/CREAT SERPL: 14.7 (ref 7–25)
CALCIUM SPEC-SCNC: 9.1 MG/DL (ref 8.6–10.5)
CHLORIDE SERPL-SCNC: 103 MMOL/L (ref 98–107)
CO2 SERPL-SCNC: 25.2 MMOL/L (ref 22–29)
CREAT SERPL-MCNC: 1.09 MG/DL (ref 0.76–1.27)
DEPRECATED RDW RBC AUTO: 53.3 FL (ref 37–54)
EGFRCR SERPLBLD CKD-EPI 2021: 66.9 ML/MIN/1.73
EOSINOPHIL # BLD AUTO: 0.06 10*3/MM3 (ref 0–0.4)
EOSINOPHIL NFR BLD AUTO: 1.3 % (ref 0.3–6.2)
ERYTHROCYTE [DISTWIDTH] IN BLOOD BY AUTOMATED COUNT: 16.2 % (ref 12.3–15.4)
GLOBULIN UR ELPH-MCNC: 2.6 GM/DL
GLUCOSE SERPL-MCNC: 190 MG/DL (ref 65–99)
HCT VFR BLD AUTO: 32.1 % (ref 37.5–51)
HGB BLD-MCNC: 10.2 G/DL (ref 13–17.7)
IMM GRANULOCYTES # BLD AUTO: 0.02 10*3/MM3 (ref 0–0.05)
IMM GRANULOCYTES NFR BLD AUTO: 0.4 % (ref 0–0.5)
LYMPHOCYTES # BLD AUTO: 0.66 10*3/MM3 (ref 0.7–3.1)
LYMPHOCYTES NFR BLD AUTO: 13.9 % (ref 19.6–45.3)
MCH RBC QN AUTO: 28.8 PG (ref 26.6–33)
MCHC RBC AUTO-ENTMCNC: 31.8 G/DL (ref 31.5–35.7)
MCV RBC AUTO: 90.7 FL (ref 79–97)
MONOCYTES # BLD AUTO: 0.65 10*3/MM3 (ref 0.1–0.9)
MONOCYTES NFR BLD AUTO: 13.7 % (ref 5–12)
NEUTROPHILS NFR BLD AUTO: 3.31 10*3/MM3 (ref 1.7–7)
NEUTROPHILS NFR BLD AUTO: 69.6 % (ref 42.7–76)
NRBC BLD AUTO-RTO: 0 /100 WBC (ref 0–0.2)
PLATELET # BLD AUTO: 145 10*3/MM3 (ref 140–450)
PMV BLD AUTO: 9.4 FL (ref 6–12)
POTASSIUM SERPL-SCNC: 4.5 MMOL/L (ref 3.5–5.2)
PROT SERPL-MCNC: 6.5 G/DL (ref 6–8.5)
RBC # BLD AUTO: 3.54 10*6/MM3 (ref 4.14–5.8)
SODIUM SERPL-SCNC: 140 MMOL/L (ref 136–145)
WBC NRBC COR # BLD: 4.75 10*3/MM3 (ref 3.4–10.8)

## 2022-02-28 PROCEDURE — 80053 COMPREHEN METABOLIC PANEL: CPT

## 2022-02-28 PROCEDURE — 25010000002 VINCRISTINE PER 1 MG: Performed by: INTERNAL MEDICINE

## 2022-02-28 PROCEDURE — 36415 COLL VENOUS BLD VENIPUNCTURE: CPT

## 2022-02-28 PROCEDURE — 96377 APPLICATON ON-BODY INJECTOR: CPT

## 2022-02-28 PROCEDURE — 25010000002 PALONOSETRON 0.25 MG/5ML SOLUTION PREFILLED SYRINGE: Performed by: INTERNAL MEDICINE

## 2022-02-28 PROCEDURE — 96375 TX/PRO/DX INJ NEW DRUG ADDON: CPT

## 2022-02-28 PROCEDURE — 25010000002 FOSAPREPITANT PER 1 MG: Performed by: INTERNAL MEDICINE

## 2022-02-28 PROCEDURE — 25010000002 DOXORUBICIN PER 10 MG: Performed by: INTERNAL MEDICINE

## 2022-02-28 PROCEDURE — 85025 COMPLETE CBC W/AUTO DIFF WBC: CPT

## 2022-02-28 PROCEDURE — 96413 CHEMO IV INFUSION 1 HR: CPT

## 2022-02-28 PROCEDURE — 25010000002 DEXAMETHASONE SODIUM PHOSPHATE 20 MG/5ML SOLUTION: Performed by: INTERNAL MEDICINE

## 2022-02-28 PROCEDURE — 25010000002 RITUXIMAB 10 MG/ML SOLUTION 50 ML VIAL: Performed by: INTERNAL MEDICINE

## 2022-02-28 PROCEDURE — 96376 TX/PRO/DX INJ SAME DRUG ADON: CPT

## 2022-02-28 PROCEDURE — 96409 CHEMO IV PUSH SNGL DRUG: CPT

## 2022-02-28 PROCEDURE — 96411 CHEMO IV PUSH ADDL DRUG: CPT

## 2022-02-28 PROCEDURE — 96368 THER/DIAG CONCURRENT INF: CPT

## 2022-02-28 PROCEDURE — 96417 CHEMO IV INFUS EACH ADDL SEQ: CPT

## 2022-02-28 PROCEDURE — 25010000002 CYCLOPHOSPHAMIDE 500 MG/2.5ML SOLUTION 2.5 ML VIAL: Performed by: INTERNAL MEDICINE

## 2022-02-28 PROCEDURE — 25010000002 RITUXIMAB 10 MG/ML SOLUTION 10 ML VIAL: Performed by: INTERNAL MEDICINE

## 2022-02-28 PROCEDURE — 25010000002 FUROSEMIDE PER 20 MG: Performed by: INTERNAL MEDICINE

## 2022-02-28 PROCEDURE — 25010000002 PEGFILGRASTIM 6 MG/0.6ML PREFILLED SYRINGE KIT: Performed by: INTERNAL MEDICINE

## 2022-02-28 PROCEDURE — 25010000002 CYCLOPHOSPHAMIDE 1 GM/5ML SOLUTION 5 ML VIAL: Performed by: INTERNAL MEDICINE

## 2022-02-28 PROCEDURE — 25010000002 DIPHENHYDRAMINE PER 50 MG: Performed by: INTERNAL MEDICINE

## 2022-02-28 PROCEDURE — 96367 TX/PROPH/DG ADDL SEQ IV INF: CPT

## 2022-02-28 PROCEDURE — 96415 CHEMO IV INFUSION ADDL HR: CPT

## 2022-02-28 RX ORDER — ACETAMINOPHEN 325 MG/1
650 TABLET ORAL ONCE
Status: COMPLETED | OUTPATIENT
Start: 2022-02-28 | End: 2022-02-28

## 2022-02-28 RX ORDER — FUROSEMIDE 10 MG/ML
20 INJECTION INTRAMUSCULAR; INTRAVENOUS ONCE
Status: COMPLETED | OUTPATIENT
Start: 2022-02-28 | End: 2022-02-28

## 2022-02-28 RX ORDER — OLANZAPINE 5 MG/1
5 TABLET ORAL ONCE
Status: COMPLETED | OUTPATIENT
Start: 2022-02-28 | End: 2022-02-28

## 2022-02-28 RX ORDER — SODIUM CHLORIDE 9 MG/ML
250 INJECTION, SOLUTION INTRAVENOUS ONCE
Status: DISCONTINUED | OUTPATIENT
Start: 2022-02-28 | End: 2022-02-28 | Stop reason: HOSPADM

## 2022-02-28 RX ORDER — FAMOTIDINE 10 MG/ML
20 INJECTION, SOLUTION INTRAVENOUS AS NEEDED
Status: DISCONTINUED | OUTPATIENT
Start: 2022-02-28 | End: 2022-02-28 | Stop reason: HOSPADM

## 2022-02-28 RX ORDER — DIPHENHYDRAMINE HYDROCHLORIDE 50 MG/ML
50 INJECTION INTRAMUSCULAR; INTRAVENOUS AS NEEDED
Status: DISCONTINUED | OUTPATIENT
Start: 2022-02-28 | End: 2022-02-28 | Stop reason: HOSPADM

## 2022-02-28 RX ORDER — MEPERIDINE HYDROCHLORIDE 25 MG/ML
25 INJECTION INTRAMUSCULAR; INTRAVENOUS; SUBCUTANEOUS
Status: DISCONTINUED | OUTPATIENT
Start: 2022-02-28 | End: 2022-02-28 | Stop reason: HOSPADM

## 2022-02-28 RX ORDER — PALONOSETRON 0.05 MG/ML
0.25 INJECTION, SOLUTION INTRAVENOUS ONCE
Status: COMPLETED | OUTPATIENT
Start: 2022-02-28 | End: 2022-02-28

## 2022-02-28 RX ORDER — DOXORUBICIN HYDROCHLORIDE 2 MG/ML
37.5 INJECTION, SOLUTION INTRAVENOUS ONCE
Status: COMPLETED | OUTPATIENT
Start: 2022-02-28 | End: 2022-02-28

## 2022-02-28 RX ADMIN — DEXAMETHASONE SODIUM PHOSPHATE 12 MG: 4 INJECTION, SOLUTION INTRAMUSCULAR; INTRAVENOUS at 12:27

## 2022-02-28 RX ADMIN — DIPHENHYDRAMINE HYDROCHLORIDE 50 MG: 50 INJECTION INTRAMUSCULAR; INTRAVENOUS at 10:11

## 2022-02-28 RX ADMIN — DOXORUBICIN HYDROCHLORIDE 74 MG: 2 INJECTION, SOLUTION INTRAVENOUS at 13:01

## 2022-02-28 RX ADMIN — PALONOSETRON 0.25 MG: 0.25 INJECTION, SOLUTION INTRAVENOUS at 12:26

## 2022-02-28 RX ADMIN — FUROSEMIDE 20 MG: 10 INJECTION, SOLUTION INTRAMUSCULAR; INTRAVENOUS at 10:06

## 2022-02-28 RX ADMIN — PEGFILGRASTIM 6 MG: KIT SUBCUTANEOUS at 14:14

## 2022-02-28 RX ADMIN — ACETAMINOPHEN 650 MG: 325 TABLET ORAL at 10:09

## 2022-02-28 RX ADMIN — VINCRISTINE SULFATE 2 MG: 1 INJECTION, SOLUTION INTRAVENOUS at 13:14

## 2022-02-28 RX ADMIN — RITUXIMAB 750 MG: 10 INJECTION, SOLUTION INTRAVENOUS at 10:40

## 2022-02-28 RX ADMIN — CYCLOPHOSPHAMIDE 1490 MG: 200 INJECTION, SOLUTION INTRAVENOUS at 13:32

## 2022-02-28 RX ADMIN — FOSAPREPITANT 150 MG: 150 INJECTION, POWDER, LYOPHILIZED, FOR SOLUTION INTRAVENOUS at 12:27

## 2022-02-28 RX ADMIN — OLANZAPINE 5 MG: 5 TABLET, FILM COATED ORAL at 12:27

## 2022-03-02 RX ORDER — DULAGLUTIDE 1.5 MG/.5ML
1.5 INJECTION, SOLUTION SUBCUTANEOUS WEEKLY
Qty: 2 ML | Refills: 0 | Status: SHIPPED | OUTPATIENT
Start: 2022-03-02 | End: 2022-04-08 | Stop reason: SDUPTHER

## 2022-03-03 ENCOUNTER — PRIOR AUTHORIZATION (OUTPATIENT)
Dept: ENDOCRINOLOGY | Facility: CLINIC | Age: 87
End: 2022-03-03

## 2022-03-03 NOTE — TELEPHONE ENCOUNTER
Blane Dietz Key: [id number] - PA Case ID: [id number] - Rx #: [id number] help? Call us at [phone]  Outcome  Approvedtoday  PA Case: [id number], Status: Approved, Coverage Starts on: 1/1/2022 12:00:00 AM, Coverage Ends on: 12/31/2022 12:00:00 AM. Questions? Contact [phone].  Drug  Trulicity 1.5MG/0.5ML pen-injectors  Form  [employer] PA Form  Original Claim Info  [id number],[phone]

## 2022-03-25 RX ORDER — EMPAGLIFLOZIN 10 MG/1
TABLET, FILM COATED ORAL
Qty: 30 TABLET | Refills: 0 | OUTPATIENT
Start: 2022-03-25

## 2022-03-28 ENCOUNTER — HOSPITAL ENCOUNTER (OUTPATIENT)
Dept: PET IMAGING | Facility: HOSPITAL | Age: 87
Discharge: HOME | End: 2022-03-28

## 2022-03-28 DIAGNOSIS — C83.39 DIFFUSE LARGE B-CELL LYMPHOMA OF SOLID ORGAN EXCLUDING SPLEEN: ICD-10-CM

## 2022-03-28 LAB — GLUCOSE BLDC GLUCOMTR-MCNC: 191 MG/DL (ref 70–130)

## 2022-03-28 PROCEDURE — 0 FLUDEOXYGLUCOSE F18 SOLUTION: Performed by: INTERNAL MEDICINE

## 2022-03-28 PROCEDURE — 78815 PET IMAGE W/CT SKULL-THIGH: CPT

## 2022-03-28 PROCEDURE — 82962 GLUCOSE BLOOD TEST: CPT

## 2022-03-28 PROCEDURE — A9552 F18 FDG: HCPCS | Performed by: INTERNAL MEDICINE

## 2022-03-28 RX ADMIN — FLUDEOXYGLUCOSE F18 1 DOSE: 300 INJECTION INTRAVENOUS at 11:20

## 2022-03-30 ENCOUNTER — INFUSION (OUTPATIENT)
Dept: ONCOLOGY | Facility: HOSPITAL | Age: 87
End: 2022-03-30

## 2022-03-30 ENCOUNTER — OFFICE VISIT (OUTPATIENT)
Dept: ONCOLOGY | Facility: CLINIC | Age: 87
End: 2022-03-30

## 2022-03-30 VITALS
HEIGHT: 66 IN | TEMPERATURE: 97.9 F | HEART RATE: 107 BPM | RESPIRATION RATE: 16 BRPM | WEIGHT: 203 LBS | SYSTOLIC BLOOD PRESSURE: 185 MMHG | OXYGEN SATURATION: 98 % | DIASTOLIC BLOOD PRESSURE: 98 MMHG | BODY MASS INDEX: 32.62 KG/M2

## 2022-03-30 DIAGNOSIS — C61 PROSTATE CANCER: Primary | ICD-10-CM

## 2022-03-30 DIAGNOSIS — C83.39 DIFFUSE LARGE B-CELL LYMPHOMA OF SOLID ORGAN EXCLUDING SPLEEN: Primary | ICD-10-CM

## 2022-03-30 DIAGNOSIS — R93.6 ABNORMAL FINDINGS ON DIAGNOSTIC IMAGING OF LIMBS: ICD-10-CM

## 2022-03-30 DIAGNOSIS — C83.39 DIFFUSE LARGE B-CELL LYMPHOMA OF SOLID ORGAN EXCLUDING SPLEEN: ICD-10-CM

## 2022-03-30 LAB
ALBUMIN SERPL-MCNC: 4.1 G/DL (ref 3.5–5.2)
ALBUMIN/GLOB SERPL: 1.6 G/DL
ALP SERPL-CCNC: 177 U/L (ref 39–117)
ALT SERPL W P-5'-P-CCNC: 33 U/L (ref 1–41)
ANION GAP SERPL CALCULATED.3IONS-SCNC: 10 MMOL/L (ref 5–15)
AST SERPL-CCNC: 41 U/L (ref 1–40)
BASOPHILS # BLD AUTO: 0.05 10*3/MM3 (ref 0–0.2)
BASOPHILS NFR BLD AUTO: 0.8 % (ref 0–1.5)
BILIRUB SERPL-MCNC: 0.9 MG/DL (ref 0–1.2)
BUN SERPL-MCNC: 14 MG/DL (ref 8–23)
BUN/CREAT SERPL: 10.4 (ref 7–25)
CALCIUM SPEC-SCNC: 9.3 MG/DL (ref 8.6–10.5)
CHLORIDE SERPL-SCNC: 103 MMOL/L (ref 98–107)
CO2 SERPL-SCNC: 27 MMOL/L (ref 22–29)
CREAT SERPL-MCNC: 1.35 MG/DL (ref 0.76–1.27)
DEPRECATED RDW RBC AUTO: 53.8 FL (ref 37–54)
EGFRCR SERPLBLD CKD-EPI 2021: 51.8 ML/MIN/1.73
EOSINOPHIL # BLD AUTO: 0.19 10*3/MM3 (ref 0–0.4)
EOSINOPHIL NFR BLD AUTO: 3.1 % (ref 0.3–6.2)
ERYTHROCYTE [DISTWIDTH] IN BLOOD BY AUTOMATED COUNT: 15.7 % (ref 12.3–15.4)
GLOBULIN UR ELPH-MCNC: 2.6 GM/DL
GLUCOSE SERPL-MCNC: 186 MG/DL (ref 65–99)
HCT VFR BLD AUTO: 34.5 % (ref 37.5–51)
HGB BLD-MCNC: 10.7 G/DL (ref 13–17.7)
IMM GRANULOCYTES # BLD AUTO: 0.03 10*3/MM3 (ref 0–0.05)
IMM GRANULOCYTES NFR BLD AUTO: 0.5 % (ref 0–0.5)
LYMPHOCYTES # BLD AUTO: 1 10*3/MM3 (ref 0.7–3.1)
LYMPHOCYTES NFR BLD AUTO: 16.4 % (ref 19.6–45.3)
MAGNESIUM SERPL-MCNC: 1.7 MG/DL (ref 1.6–2.4)
MCH RBC QN AUTO: 28.8 PG (ref 26.6–33)
MCHC RBC AUTO-ENTMCNC: 31 G/DL (ref 31.5–35.7)
MCV RBC AUTO: 92.7 FL (ref 79–97)
MONOCYTES # BLD AUTO: 0.62 10*3/MM3 (ref 0.1–0.9)
MONOCYTES NFR BLD AUTO: 10.2 % (ref 5–12)
NEUTROPHILS NFR BLD AUTO: 4.21 10*3/MM3 (ref 1.7–7)
NEUTROPHILS NFR BLD AUTO: 69 % (ref 42.7–76)
NRBC BLD AUTO-RTO: 0 /100 WBC (ref 0–0.2)
PHOSPHATE SERPL-MCNC: 4.2 MG/DL (ref 2.5–4.5)
PLATELET # BLD AUTO: 144 10*3/MM3 (ref 140–450)
PMV BLD AUTO: 9.8 FL (ref 6–12)
POTASSIUM SERPL-SCNC: 4.6 MMOL/L (ref 3.5–5.2)
PROT SERPL-MCNC: 6.7 G/DL (ref 6–8.5)
PSA SERPL-MCNC: 3.58 NG/ML (ref 0–4)
RBC # BLD AUTO: 3.72 10*6/MM3 (ref 4.14–5.8)
SODIUM SERPL-SCNC: 140 MMOL/L (ref 136–145)
WBC NRBC COR # BLD: 6.1 10*3/MM3 (ref 3.4–10.8)

## 2022-03-30 PROCEDURE — 80053 COMPREHEN METABOLIC PANEL: CPT

## 2022-03-30 PROCEDURE — 96402 CHEMO HORMON ANTINEOPL SQ/IM: CPT

## 2022-03-30 PROCEDURE — 84153 ASSAY OF PSA TOTAL: CPT

## 2022-03-30 PROCEDURE — 25010000002 LEUPROLIDE 22.5 MG KIT: Performed by: INTERNAL MEDICINE

## 2022-03-30 PROCEDURE — 96372 THER/PROPH/DIAG INJ SC/IM: CPT

## 2022-03-30 PROCEDURE — 36415 COLL VENOUS BLD VENIPUNCTURE: CPT

## 2022-03-30 PROCEDURE — 25010000002 DENOSUMAB 60 MG/ML SOLUTION PREFILLED SYRINGE: Performed by: INTERNAL MEDICINE

## 2022-03-30 PROCEDURE — 85025 COMPLETE CBC W/AUTO DIFF WBC: CPT

## 2022-03-30 PROCEDURE — 83735 ASSAY OF MAGNESIUM: CPT

## 2022-03-30 PROCEDURE — 99214 OFFICE O/P EST MOD 30 MIN: CPT | Performed by: INTERNAL MEDICINE

## 2022-03-30 PROCEDURE — 84100 ASSAY OF PHOSPHORUS: CPT

## 2022-03-30 RX ORDER — EMPAGLIFLOZIN 10 MG/1
10 TABLET, FILM COATED ORAL DAILY
Qty: 30 TABLET | Refills: 0 | Status: SHIPPED | OUTPATIENT
Start: 2022-03-30 | End: 2022-06-15

## 2022-03-30 RX ORDER — FUROSEMIDE 40 MG/1
40 TABLET ORAL DAILY
Status: ON HOLD | COMMUNITY
Start: 2022-03-01 | End: 2022-06-04

## 2022-03-30 RX ADMIN — LEUPROLIDE ACETATE 22.5 MG: KIT SUBCUTANEOUS at 14:44

## 2022-03-30 RX ADMIN — DENOSUMAB 60 MG: 60 INJECTION SUBCUTANEOUS at 14:47

## 2022-03-30 NOTE — PROGRESS NOTES
DATE OF VISIT: 3/30/2022    REASON FOR VISIT: Followup for: 1.  Diffuse large B-cell lymphoma 2.  Prostate cancer     PROBLEM LIST:  1. Prostate cancer, initially presented as [id number] status post radical  prostatectomy 2000.  A.  Patient is on Lupron plus Prolia  B.  Tried to add apalutamide second rising PSA patient declined.  2.  Right-sided pulmonary embolisms:  A.  Currently on Eliquis twice a day  3.  Type 2 diabetes  4. Hhypertension  5. Hypercholesterolemia.  6.  Right subsegmental PE:  A. Started on Eliquis twice a day March 2021  7.  Osteopenia  8.  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  Status post right nephrectomy December 7, 2021  B.  Started adjuvant chemotherapy R-CHOP January 14, 2022, status post 3 cycles completed February 23, 2022      HISTORY OF PRESENT ILLNESS: The patient is a very pleasant 86 y.o. male  with past medical history significant for diffuse large B-cell lymphoma right kidney diagnosed December 2021.  Patient status post 3 cycles of chemotherapy with R-CHOP.  Repeat PET scan showed no evidence of residual disease. The  patient is here today for scheduled follow-up visit.    SUBJECTIVE: The patient has been doing fairly well. he was able to tolerate  his treatment without any serious side effects. he denied any fever or  chills, no night sweats, denied any headaches    Past History:  Medical History: has a past medical history of Aortic stenosis, Arthritis, Bilateral impacted cerumen (11/23/2016), Bronchitis, Chronic gout of right foot (6/13/2016), COVID-19 vaccine series completed (05/12/2021), Depression, Diabetes mellitus (HCC), Diverticulitis, Exogenous obesity, Gout, Heart murmur, History of vitamin D deficiency, Hypercholesteremia (8/11/2016), Hyperlipidemia, Hypertension, Kidney lesion, Malignant neoplasm of prostate (HCC), Morbid obesity (HCC) (8/11/2016), Pneumonia (05/12/2021), Primary osteoarthritis involving multiple joints (6/13/2016),  "Pulmonary embolism (HCC), Sleep apnea (05/12/2021), Uncontrolled type 2 diabetes mellitus with hyperglycemia (HCC) (6/13/2016), and Vertigo.   Surgical History: has a past surgical history that includes Colostomy (1999); Other surgical history; Exploratory laparotomy; Colonoscopy; Revision Colostomy; Prostate surgery; Prostatectomy (2000); Colon surgery; Tonsillectomy; Skin cancer excision; Lipoma Excision; Mohs surgery; Cardiac valve replacement (05/12/2021); nephroureterectomy (Right, 12/7/2021); and Cystoscopy (N/A, 12/7/2021).   Family History: family history includes Breast cancer in an other family member; Cancer in his mother and another family member; Diabetes in his mother and another family member; Heart disease in his father; Hypertension in an other family member; Lung cancer in his mother; Migraines in an other family member; Obesity in an other family member.   Social History: reports that he has never smoked. He has never used smokeless tobacco. He reports that he does not drink alcohol and does not use drugs.    (Not in a hospital admission)     Allergies: Ampicillin, Medrol [methylprednisolone], Myrbetriq [mirabegron], Penicillins, and Bee venom     Review of Systems   All other systems reviewed and are negative.        Current Outpatient Medications:   •  allopurinol (ZYLOPRIM) 300 MG tablet, TAKE 1 TABLET BY MOUTH ONE TIME A DAY, Disp: 90 tablet, Rfl: 0  •  amLODIPine (NORVASC) 5 MG tablet, Take 1 tablet by mouth Daily., Disp: 30 tablet, Rfl: 1  •  apixaban (ELIQUIS) 5 MG tablet tablet, Take 1 tablet by mouth Every 12 (Twelve) Hours., Disp: , Rfl:   •  BD Insulin Syringe U/F 31G X 5/16\" 0.5 ML misc, , Disp: , Rfl:   •  Cholecalciferol (VITAMIN D3) 2000 UNITS capsule, Take  by mouth 2 (two) times a day., Disp: , Rfl:   •  Cinnamon 500 MG tablet, Take 2,000 tablets by mouth Daily., Disp: , Rfl:   •  Coenzyme Q10 100 MG tablet, Take 2 tablets by mouth Daily., Disp: , Rfl:   •  docusate sodium " (Colace) 100 MG capsule, Take 1 capsule by mouth Daily As Needed for Constipation., Disp: 30 capsule, Rfl: 1  •  Dulaglutide (Trulicity) 1.5 MG/0.5ML solution pen-injector, Inject 1.5 mg under the skin into the appropriate area as directed 1 (One) Time Per Week. NO FURTHER REFILLS WITHOUT APPT, Disp: 2 mL, Rfl: 0  •  fenofibric acid (TRILIPIX) 135 MG capsule delayed-release delayed release capsule, TAKE 1 CAPSULE BY MOUTH ONE TIME A DAY  (Patient taking differently: Take 135 mg by mouth Daily.), Disp: 30 capsule, Rfl: 2  •  fluticasone (FLONASE) 50 MCG/ACT nasal spray, 2 sprays into the nostril(s) as directed by provider Daily. 2 puffs each nostril (Patient taking differently: 2 sprays into the nostril(s) as directed by provider Daily As Needed for Rhinitis or Allergies. 2 puffs each nostril), Disp: 1 bottle, Rfl: 0  •  furosemide (LASIX) 40 MG tablet, , Disp: , Rfl:   •  HYDROcodone-acetaminophen (NORCO) 7.5-325 MG per tablet, Take 1 tablet by mouth Every 6 (Six) Hours As Needed for Moderate Pain ., Disp: 30 tablet, Rfl: 0  •  Jardiance 10 MG tablet tablet, Take 1 tablet by mouth Daily. Appointment needed for additional refills, Disp: 30 tablet, Rfl: 0  •  l-methylfolate-algae-B6-B12 (METANX) 3-90.314-2-35 MG capsule capsule, TAKE 1 CAPSULE BY MOUTH TWICE A DAY. (Patient taking differently: Take 1 capsule by mouth 2 (Two) Times a Day.), Disp: 180 capsule, Rfl: 1  •  Lantus 100 UNIT/ML injection, Up to 50 units daily (Patient taking differently: Inject 45 Units under the skin into the appropriate area as directed Every Night. Up to 50 units daily), Disp: 20 mL, Rfl: 5  •  LECITHIN PO, Take 1,200 mg by mouth Daily., Disp: , Rfl:   •  leuprolide (LUPRON) 22.5 MG injection, Inject 22.5 mg into the appropriate muscle as directed by prescriber Every 3 (Three) Months., Disp: , Rfl:   •  losartan-hydrochlorothiazide (HYZAAR) 100-25 MG per tablet, Take 1 tablet by mouth Daily., Disp: , Rfl:   •  metFORMIN ER (GLUCOPHAGE-XR)  "500 MG 24 hr tablet, Take 1 tablet by mouth 2 (Two) Times a Day. (Patient taking differently: Take 500 mg by mouth Daily With Breakfast.), Disp: 60 tablet, Rfl: 5  •  methocarbamol (ROBAXIN) 500 MG tablet, Take 1 tablet by mouth 3 (Three) Times a Day As Needed for Muscle Spasms., Disp: 30 tablet, Rfl: 0  •  metoprolol succinate XL (TOPROL-XL) 100 MG 24 hr tablet, TAKE 1 TABLET BY MOUTH EVERY DAY  (Patient taking differently: Take 100 mg by mouth Daily.), Disp: 90 tablet, Rfl: 0  •  ondansetron (ZOFRAN) 8 MG tablet, Take 1 tablet by mouth 3 (Three) Times a Day As Needed for Nausea or Vomiting., Disp: 30 tablet, Rfl: 5  •  polyethylene glycol (MIRALAX) 17 GM/SCOOP powder, Dissolve 17 grams (1 capful) in 8 ounces of water and drink Daily., Disp: 238 g, Rfl: 0  •  pravastatin (PRAVACHOL) 40 MG tablet, TAKE ONE TABLET BY MOUTH EVERY NIGHT AT BEDTIME (Patient taking differently: Take 40 mg by mouth Every Night.), Disp: 90 tablet, Rfl: 0  •  predniSONE (DELTASONE) 50 MG tablet, Take 50 mg by mouth 2 (Two) Times a Day. Take 100mg daily for 5 days with treatment, Disp: , Rfl:   •  triamterene-hydrochlorothiazide (MAXZIDE) 75-50 MG per tablet, Take 1 tablet by mouth Daily., Disp: , Rfl:   No current facility-administered medications for this visit.    Facility-Administered Medications Ordered in Other Visits:   •  denosumab (PROLIA) syringe 60 mg, 60 mg, Subcutaneous, Once, Shannan Ramon MD  •  leuprolide (ELIGARD) injection 22.5 mg, 22.5 mg, Subcutaneous, Once, Shannan Ramon MD  •  leuprolide (LUPRON) injection 22.5 mg, 22.5 mg, Intramuscular, Once, Shannan Ramon MD    PHYSICAL EXAMINATION:   BP (!) 185/98   Pulse 107   Temp 97.9 °F (36.6 °C) (Temporal)   Resp 16   Ht 167.6 cm (66\")   Wt 92.1 kg (203 lb)   SpO2 98%   BMI 32.77 kg/m²    Pain Score    03/30/22 1332   PainSc: 0-No pain                     ECOG Performance Status: 1 - Symptomatic but completely ambulatory  General Appearance:  alert, cooperative, no " apparent distress and appears stated age   Neurologic/Psychiatric: A&O x 3, gait steady, appropriate affect, strength 5/5 in all muscle groups   HEENT:  Normocephalic, without obvious abnormality, mucous membranes moist   Neck: Supple, symmetrical, trachea midline, no adenopathy;  No thyromegaly, masses, or tenderness   Lungs:   Clear to auscultation bilaterally; respirations regular, even, and unlabored bilaterally   Heart:  Regular rate and rhythm, no murmurs appreciated   Abdomen:   Soft, non-tender, non-distended and no organomegaly   Lymph nodes: No cervical, supraclavicular, inguinal or axillary adenopathy noted   Extremities: Normal, atraumatic; no clubbing, cyanosis, or edema    Skin: No rashes, ulcers, or suspicious lesions noted     Hospital Outpatient Visit on 03/28/2022   Component Date Value Ref Range Status   • Glucose 03/28/2022 191 (A) 70 - 130 mg/dL Final    Meter: [id number] : [id number] Christo Vega        NM PET/CT Skull Base to Mid Thigh    Result Date: 3/28/2022  Narrative: DATE OF EXAM: 3/28/2022 11:37 AM  PROCEDURE: NM PET/CT SKULL BASE TO MID THIGH-  INDICATIONS: f/u scans; C83.39-Diffuse large b-cell lymphoma, extranodal and solid organ sites  TECHNIQUE: 11.39 mCi of F-18 labeled FDG was administered intravenously followed by PET imaging from the skull base through the mid thighs. Low-dose non contrast CT imaging of the same body region was performed. Fused PET-CT images and 3D MIP PET images were utilized for interpretation. Blood glucose level at the time of injection was 191 mg/dl.  COMPARISON: Subsequent exam for follow-up with prior PET/CT dated 10/18/2021 radiology  HEAD AND NECK: Physiologic hypermetabolism of the aerodigestive tract structures is present, without hypermetabolic cervical adenopathy or aerodigestive tract mass. Normal hypermetabolism of the cerebral hemispheres.  CHEST: There is physiologic hypermetabolism of the left ventricular myocardium. There is otherwise  no hypermetabolic adenopathy or pulmonary nodularity. Small bilateral pleural effusions are present, new from comparison.  ABDOMEN AND PELVIS: There is physiologic activity of the gastrointestinal and genitourinary tracts, without focal hypermetabolism concerning for acute or neoplastic process. Operative changes are noted from interval right nephrectomy and resection of adjacent previously noted soft tissue nodules. There is no new focal hypermetabolism or mass identified in the right nephrectomy bed. Osseous structures are unremarkable diffusely.      Impression: Negative PET/CT, without evidence of recurrent or metastatic disease. Operative changes are noted from interval right nephrectomy and resection of the previously identified hypermetabolic adjacent soft tissue nodule. There is no evidence of recurrent or metastatic disease.  Accompanying CT demonstrates small bilateral pleural effusions, new from comparison.  This report was finalized on 3/28/2022 3:40 PM by Manuel Bradley.        ASSESSMENT: The patient is a very pleasant 86 y.o. male  with diffuse large B-cell lymphoma      PLAN:    1.  Diffuse large B-cell lymphoma stage Ia:  A.  I did go over the whole-body PET scan results with the patient reassured there is no evidence of residual disease.  B.  The patient has completed 3 cycles of R-CHOP chemotherapy.  I will do watchful waiting at this point.  C.  The patient will follow up with us in 3 months with repeated CT scans.    2.  Prostate cancer:  A.  I did go over the last serum PSA with the patient from January 12, 2022.  It was elevated 4.7.  B.  I will repeat the patient blood work today including his serum PSA.  C.  I will continue Lupron every 3 months.  D.  I will consider adding Xtandi if his PSA gets above 5.    3.  Chemotherapy-induced anemia:  A.  I did go over the blood the results with the patient from February 28, 2022.  I explained the patient's hemoglobin dropped to 10.2.  B.  I will  repeat the patient blood work today.  C.  This should gradually improve now that he has completed his chemotherapy course.    4.  Right-sided pulmonary embolism:  A.  I will stop Eliquis at this point.  The patient has completed 1 year of treatment.    5.  Hypercholesteremia:  A.  The patient continue pravastatin daily.    6.  Osteopenia:  A.  I will continue Prolia every 6 months.  He will be treated today.  His next dose will be due end of August 2022.    7.  Type 2 diabetes:  A.  The patient will continue Mo and Trulicity.    FOLLOW UP: 3 months with Eligard treatment as well as blood work.    Shannan Ramon MD  3/30/2022

## 2022-03-31 DIAGNOSIS — E11.65 UNCONTROLLED TYPE 2 DIABETES MELLITUS WITH HYPERGLYCEMIA: ICD-10-CM

## 2022-03-31 RX ORDER — INSULIN GLARGINE 100 [IU]/ML
INJECTION, SOLUTION SUBCUTANEOUS
Qty: 20 ML | Refills: 2 | Status: SHIPPED | OUTPATIENT
Start: 2022-03-31 | End: 2022-07-19 | Stop reason: SDUPTHER

## 2022-04-04 RX ORDER — DULAGLUTIDE 1.5 MG/.5ML
INJECTION, SOLUTION SUBCUTANEOUS
Qty: 2 ML | Refills: 0 | OUTPATIENT
Start: 2022-04-04

## 2022-04-08 ENCOUNTER — OFFICE VISIT (OUTPATIENT)
Dept: ENDOCRINOLOGY | Facility: CLINIC | Age: 87
End: 2022-04-08

## 2022-04-08 VITALS
WEIGHT: 206 LBS | OXYGEN SATURATION: 93 % | HEIGHT: 66 IN | HEART RATE: 82 BPM | SYSTOLIC BLOOD PRESSURE: 130 MMHG | BODY MASS INDEX: 33.11 KG/M2 | DIASTOLIC BLOOD PRESSURE: 73 MMHG

## 2022-04-08 DIAGNOSIS — E11.65 UNCONTROLLED TYPE 2 DIABETES MELLITUS WITH HYPERGLYCEMIA: Primary | ICD-10-CM

## 2022-04-08 PROBLEM — E11.9 T2DM (TYPE 2 DIABETES MELLITUS): Status: RESOLVED | Noted: 2021-03-28 | Resolved: 2022-04-08

## 2022-04-08 LAB
EXPIRATION DATE: ABNORMAL
EXPIRATION DATE: NORMAL
GLUCOSE BLDC GLUCOMTR-MCNC: 163 MG/DL (ref 70–130)
HBA1C MFR BLD: 8.8 %
Lab: ABNORMAL
Lab: NORMAL

## 2022-04-08 PROCEDURE — 82947 ASSAY GLUCOSE BLOOD QUANT: CPT | Performed by: INTERNAL MEDICINE

## 2022-04-08 PROCEDURE — 3052F HG A1C>EQUAL 8.0%<EQUAL 9.0%: CPT | Performed by: INTERNAL MEDICINE

## 2022-04-08 PROCEDURE — 83036 HEMOGLOBIN GLYCOSYLATED A1C: CPT | Performed by: INTERNAL MEDICINE

## 2022-04-08 PROCEDURE — 99213 OFFICE O/P EST LOW 20 MIN: CPT | Performed by: INTERNAL MEDICINE

## 2022-04-08 RX ORDER — DULAGLUTIDE 1.5 MG/.5ML
1.5 INJECTION, SOLUTION SUBCUTANEOUS WEEKLY
Qty: 2 ML | Refills: 0 | Status: SHIPPED | OUTPATIENT
Start: 2022-04-08 | End: 2022-07-19 | Stop reason: SDUPTHER

## 2022-04-08 NOTE — PROGRESS NOTES
"     Office Note      Date: 2022  Patient Name: Blane Dietz  MRN: [id number]  : 1935    Chief Complaint   Patient presents with   • Diabetes       History of Present Illness:   Blane Dietz is a 86 y.o. male who presents for Diabetes - type 2  On insulin , jardiance and trulicity  bg checks are done daily  No serious hypos    Last A1c:  Hemoglobin A1C   Date Value Ref Range Status   2022 8.8 % Final   2021 9.50 (H) 4.80 - 5.60 % Final       Changes in health since last visit: had a pe. Had kidney cancer. Got chemo that included steroids. . Last eye exam pending.    Subjective          Review of Systems:   Review of Systems   Cardiovascular: Positive for leg swelling.       The following portions of the patient's history were reviewed and updated as appropriate: allergies, current medications, past family history, past medical history, past social history, past surgical history and problem list.    Objective     Visit Vitals  /73   Pulse 82   Ht 167.6 cm (66\")   Wt 93.4 kg (206 lb)   SpO2 93%   BMI 33.25 kg/m²       Labs:    CMP  Lab Results   Component Value Date    GLUCOSE 186 (H) 2022    BUN 14 2022    CREATININE 1.35 (H) 2022    EGFRIFNONA 55 (L) 2022    EGFRIFAFRI 95 10/14/2020    BCR 10.4 2022    K 4.6 2022    CO2 27.0 2022    CALCIUM 9.3 2022    PROTENTOTREF 6.8 10/14/2020    LABIL2 2.0 10/14/2020    AST 41 (H) 2022    ALT 33 2022        CBC w/DIFF  Lab Results   Component Value Date    WBC 6.10 2022    RBC 3.72 (L) 2022    HGB 10.7 (L) 2022    HCT 34.5 (L) 2022    MCV 92.7 2022    MCH 28.8 2022    MCHC 31.0 (L) 2022    RDW 15.7 (H) 2022    RDWSD 53.8 2022    MPV 9.8 2022     2022    NEUTRORELPCT 69.0 2022    LYMPHORELPCT 16.4 (L) 2022    MONORELPCT 10.2 2022    EOSRELPCT 3.1 2022    BASORELPCT 0.8 2022    " AUTOIGPER 0.5 03/30/2022    NEUTROABS 4.21 03/30/2022    LYMPHSABS 1.00 03/30/2022    MONOSABS 0.62 03/30/2022    EOSABS 0.19 03/30/2022    BASOSABS 0.05 03/30/2022    AUTOIGNUM 0.03 03/30/2022    NRBC 0.0 03/30/2022       Physical Exam:  Physical Exam  Vitals reviewed.   Constitutional:       Appearance: Normal appearance. He is normal weight.   Cardiovascular:      Pulses:           Dorsalis pedis pulses are 2+ on the right side and 2+ on the left side.        Posterior tibial pulses are 2+ on the right side and 2+ on the left side.   Musculoskeletal:      Right foot: Normal range of motion. No deformity, bunion, Charcot foot, foot drop or prominent metatarsal heads.      Left foot: Normal range of motion. No deformity, bunion, Charcot foot, foot drop or prominent metatarsal heads.   Feet:      Right foot:      Protective Sensation: 5 sites tested. 5 sites sensed.      Skin integrity: Skin integrity normal.      Toenail Condition: Right toenails are normal.      Left foot:      Protective Sensation: 5 sites tested. 5 sites sensed.      Skin integrity: Skin integrity normal.      Toenail Condition: Left toenails are normal.      Comments: Diabetic Foot Exam Performed and Monofilament Test Performed    Left leg is swollen     Neurological:      Mental Status: He is alert.   Psychiatric:         Mood and Affect: Mood normal.         Thought Content: Thought content normal.         Judgment: Judgment normal.          Assessment / Plan      Assessment & Plan:  Problem List Items Addressed This Visit        Other    Uncontrolled type 2 diabetes mellitus with hyperglycemia (HCC) - Primary    Overview     Impression: 03/08/2016 - seeing Endo .;            Current Assessment & Plan     Diabetes is improving with treatment.   Continue current treatment regimen.  Diabetes will be reassessed in 6 months.           Relevant Medications    metFORMIN ER (GLUCOPHAGE-XR) 500 MG 24 hr tablet    Dulaglutide (Trulicity) 1.5 MG/0.5ML  solution pen-injector    Jardiance 10 MG tablet tablet    Lantus 100 UNIT/ML injection    Other Relevant Orders    POC Glucose, Blood (Completed)    POC Glycosylated Hemoglobin (Hb A1C) (Completed)           Xavier Boston MD   04/08/2022

## 2022-06-02 ENCOUNTER — APPOINTMENT (OUTPATIENT)
Dept: GENERAL RADIOLOGY | Facility: HOSPITAL | Age: 87
End: 2022-06-02

## 2022-06-02 ENCOUNTER — APPOINTMENT (OUTPATIENT)
Dept: CT IMAGING | Facility: HOSPITAL | Age: 87
End: 2022-06-02

## 2022-06-02 ENCOUNTER — HOSPITAL ENCOUNTER (OUTPATIENT)
Facility: HOSPITAL | Age: 87
Setting detail: OBSERVATION
LOS: 1 days | Discharge: HOME | End: 2022-06-10
Attending: EMERGENCY MEDICINE | Admitting: INTERNAL MEDICINE

## 2022-06-02 DIAGNOSIS — R09.02 HYPOXEMIA REQUIRING SUPPLEMENTAL OXYGEN: ICD-10-CM

## 2022-06-02 DIAGNOSIS — I50.9 ACUTE ON CHRONIC CONGESTIVE HEART FAILURE, UNSPECIFIED HEART FAILURE TYPE: Primary | ICD-10-CM

## 2022-06-02 DIAGNOSIS — J90 PLEURAL EFFUSION, BILATERAL: ICD-10-CM

## 2022-06-02 DIAGNOSIS — E66.9 DIABETES MELLITUS TYPE 2 IN OBESE: ICD-10-CM

## 2022-06-02 DIAGNOSIS — E11.69 DIABETES MELLITUS TYPE 2 IN OBESE: ICD-10-CM

## 2022-06-02 DIAGNOSIS — E11.65 UNCONTROLLED TYPE 2 DIABETES MELLITUS WITH HYPERGLYCEMIA: ICD-10-CM

## 2022-06-02 DIAGNOSIS — Z86.711 HISTORY OF PULMONARY EMBOLISM: ICD-10-CM

## 2022-06-02 DIAGNOSIS — I10 ELEVATED BLOOD PRESSURE READING WITH DIAGNOSIS OF HYPERTENSION: ICD-10-CM

## 2022-06-02 DIAGNOSIS — J96.21 ACUTE ON CHRONIC RESPIRATORY FAILURE WITH HYPOXIA: ICD-10-CM

## 2022-06-02 DIAGNOSIS — C83.39 DIFFUSE LARGE B-CELL LYMPHOMA OF SOLID ORGAN EXCLUDING SPLEEN: ICD-10-CM

## 2022-06-02 DIAGNOSIS — Z91.14 NONCOMPLIANCE WITH MEDICATIONS: ICD-10-CM

## 2022-06-02 DIAGNOSIS — E89.6 H/O PARTIAL ADRENALECTOMY: ICD-10-CM

## 2022-06-02 DIAGNOSIS — Z99.81 HYPOXEMIA REQUIRING SUPPLEMENTAL OXYGEN: ICD-10-CM

## 2022-06-02 DIAGNOSIS — J18.9 PNEUMONIA OF LEFT LOWER LOBE DUE TO INFECTIOUS ORGANISM: ICD-10-CM

## 2022-06-02 LAB
ALBUMIN SERPL-MCNC: 4.2 G/DL (ref 3.5–5.2)
ALBUMIN/GLOB SERPL: 1.4 G/DL
ALP SERPL-CCNC: 227 U/L (ref 39–117)
ALT SERPL W P-5'-P-CCNC: 48 U/L (ref 1–41)
ANION GAP SERPL CALCULATED.3IONS-SCNC: 11 MMOL/L (ref 5–15)
ARTERIAL PATENCY WRIST A: ABNORMAL
AST SERPL-CCNC: 48 U/L (ref 1–40)
ATMOSPHERIC PRESS: ABNORMAL MM[HG]
BASE EXCESS BLDA CALC-SCNC: 0 MMOL/L (ref 0–2)
BASOPHILS # BLD AUTO: 0.21 10*3/MM3 (ref 0–0.2)
BASOPHILS NFR BLD AUTO: 2.2 % (ref 0–1.5)
BDY SITE: ABNORMAL
BILIRUB SERPL-MCNC: 1.2 MG/DL (ref 0–1.2)
BODY TEMPERATURE: 37 C
BUN SERPL-MCNC: 14 MG/DL (ref 8–23)
BUN/CREAT SERPL: 12.2 (ref 7–25)
CALCIUM SPEC-SCNC: 9.2 MG/DL (ref 8.6–10.5)
CHLORIDE SERPL-SCNC: 102 MMOL/L (ref 98–107)
CO2 BLDA-SCNC: 27.3 MMOL/L (ref 22–33)
CO2 SERPL-SCNC: 27 MMOL/L (ref 22–29)
COHGB MFR BLD: 0.9 % (ref 0–2)
CREAT SERPL-MCNC: 1.15 MG/DL (ref 0.76–1.27)
CRP SERPL-MCNC: <0.3 MG/DL (ref 0–0.5)
D-LACTATE SERPL-SCNC: 1.6 MMOL/L (ref 0.5–2)
DEPRECATED RDW RBC AUTO: 47.5 FL (ref 37–54)
EGFRCR SERPLBLD CKD-EPI 2021: 62.8 ML/MIN/1.73
EOSINOPHIL # BLD AUTO: 0.21 10*3/MM3 (ref 0–0.4)
EOSINOPHIL NFR BLD AUTO: 2.2 % (ref 0.3–6.2)
EPAP: 0
ERYTHROCYTE [DISTWIDTH] IN BLOOD BY AUTOMATED COUNT: 15.1 % (ref 12.3–15.4)
FERRITIN SERPL-MCNC: 167.3 NG/ML (ref 30–400)
FIBRINOGEN PPP-MCNC: 435 MG/DL (ref 220–470)
FLUAV RNA RESP QL NAA+PROBE: NOT DETECTED
FLUBV RNA RESP QL NAA+PROBE: NOT DETECTED
GLOBULIN UR ELPH-MCNC: 3.1 GM/DL
GLUCOSE SERPL-MCNC: 277 MG/DL (ref 65–99)
HCO3 BLDA-SCNC: 25.9 MMOL/L (ref 20–26)
HCT VFR BLD AUTO: 41.3 % (ref 37.5–51)
HCT VFR BLD CALC: 40.4 % (ref 38–51)
HGB BLD-MCNC: 12.9 G/DL (ref 13–17.7)
HGB BLDA-MCNC: 13.2 G/DL (ref 13.5–17.5)
HOLD SPECIMEN: NORMAL
IMM GRANULOCYTES # BLD AUTO: 0.22 10*3/MM3 (ref 0–0.05)
IMM GRANULOCYTES NFR BLD AUTO: 2.3 % (ref 0–0.5)
INHALED O2 CONCENTRATION: 36 %
IPAP: 0
LDH SERPL-CCNC: 304 U/L (ref 135–225)
LYMPHOCYTES # BLD AUTO: 1.3 10*3/MM3 (ref 0.7–3.1)
LYMPHOCYTES NFR BLD AUTO: 13.4 % (ref 19.6–45.3)
MCH RBC QN AUTO: 27.3 PG (ref 26.6–33)
MCHC RBC AUTO-ENTMCNC: 31.2 G/DL (ref 31.5–35.7)
MCV RBC AUTO: 87.3 FL (ref 79–97)
METHGB BLD QL: 0.4 % (ref 0–1.5)
MODALITY: ABNORMAL
MONOCYTES # BLD AUTO: 0.7 10*3/MM3 (ref 0.1–0.9)
MONOCYTES NFR BLD AUTO: 7.2 % (ref 5–12)
NEUTROPHILS NFR BLD AUTO: 7.05 10*3/MM3 (ref 1.7–7)
NEUTROPHILS NFR BLD AUTO: 72.7 % (ref 42.7–76)
NOTE: ABNORMAL
NRBC BLD AUTO-RTO: 0 /100 WBC (ref 0–0.2)
NT-PROBNP SERPL-MCNC: 5664 PG/ML (ref 0–1800)
OXYHGB MFR BLDV: 95.3 % (ref 94–99)
PAW @ PEAK INSP FLOW SETTING VENT: 0 CMH2O
PCO2 BLDA: 46 MM HG (ref 35–45)
PCO2 TEMP ADJ BLD: 46 MM HG (ref 35–48)
PH BLDA: 7.36 PH UNITS (ref 7.35–7.45)
PH, TEMP CORRECTED: 7.36 PH UNITS
PLATELET # BLD AUTO: 188 10*3/MM3 (ref 140–450)
PMV BLD AUTO: 10.2 FL (ref 6–12)
PO2 BLDA: 87.7 MM HG (ref 83–108)
PO2 TEMP ADJ BLD: 87.7 MM HG (ref 83–108)
POTASSIUM SERPL-SCNC: 4.2 MMOL/L (ref 3.5–5.2)
PROCALCITONIN SERPL-MCNC: 0.11 NG/ML (ref 0–0.25)
PROT SERPL-MCNC: 7.3 G/DL (ref 6–8.5)
RBC # BLD AUTO: 4.73 10*6/MM3 (ref 4.14–5.8)
SARS-COV-2 RNA RESP QL NAA+PROBE: NOT DETECTED
SODIUM SERPL-SCNC: 140 MMOL/L (ref 136–145)
TOTAL RATE: 0 BREATHS/MINUTE
TROPONIN T SERPL-MCNC: 0.04 NG/ML (ref 0–0.03)
TROPONIN T SERPL-MCNC: 0.05 NG/ML (ref 0–0.03)
WBC NRBC COR # BLD: 9.69 10*3/MM3 (ref 3.4–10.8)
WHOLE BLOOD HOLD COAG: NORMAL
WHOLE BLOOD HOLD SPECIMEN: NORMAL

## 2022-06-02 PROCEDURE — 87636 SARSCOV2 & INF A&B AMP PRB: CPT | Performed by: EMERGENCY MEDICINE

## 2022-06-02 PROCEDURE — 82805 BLOOD GASES W/O2 SATURATION: CPT

## 2022-06-02 PROCEDURE — 80074 ACUTE HEPATITIS PANEL: CPT | Performed by: INTERNAL MEDICINE

## 2022-06-02 PROCEDURE — 82375 ASSAY CARBOXYHB QUANT: CPT

## 2022-06-02 PROCEDURE — 83615 LACTATE (LD) (LDH) ENZYME: CPT | Performed by: EMERGENCY MEDICINE

## 2022-06-02 PROCEDURE — 82728 ASSAY OF FERRITIN: CPT | Performed by: EMERGENCY MEDICINE

## 2022-06-02 PROCEDURE — 83050 HGB METHEMOGLOBIN QUAN: CPT

## 2022-06-02 PROCEDURE — 99285 EMERGENCY DEPT VISIT HI MDM: CPT

## 2022-06-02 PROCEDURE — C9803 HOPD COVID-19 SPEC COLLECT: HCPCS

## 2022-06-02 PROCEDURE — 84145 PROCALCITONIN (PCT): CPT | Performed by: EMERGENCY MEDICINE

## 2022-06-02 PROCEDURE — 96375 TX/PRO/DX INJ NEW DRUG ADDON: CPT

## 2022-06-02 PROCEDURE — 80053 COMPREHEN METABOLIC PANEL: CPT | Performed by: EMERGENCY MEDICINE

## 2022-06-02 PROCEDURE — 84484 ASSAY OF TROPONIN QUANT: CPT | Performed by: EMERGENCY MEDICINE

## 2022-06-02 PROCEDURE — 93005 ELECTROCARDIOGRAM TRACING: CPT | Performed by: EMERGENCY MEDICINE

## 2022-06-02 PROCEDURE — 85384 FIBRINOGEN ACTIVITY: CPT | Performed by: EMERGENCY MEDICINE

## 2022-06-02 PROCEDURE — 85025 COMPLETE CBC W/AUTO DIFF WBC: CPT | Performed by: EMERGENCY MEDICINE

## 2022-06-02 PROCEDURE — 83880 ASSAY OF NATRIURETIC PEPTIDE: CPT | Performed by: EMERGENCY MEDICINE

## 2022-06-02 PROCEDURE — 71275 CT ANGIOGRAPHY CHEST: CPT

## 2022-06-02 PROCEDURE — 0 IOPAMIDOL PER 1 ML: Performed by: EMERGENCY MEDICINE

## 2022-06-02 PROCEDURE — 36600 WITHDRAWAL OF ARTERIAL BLOOD: CPT

## 2022-06-02 PROCEDURE — 86140 C-REACTIVE PROTEIN: CPT | Performed by: EMERGENCY MEDICINE

## 2022-06-02 PROCEDURE — 71045 X-RAY EXAM CHEST 1 VIEW: CPT

## 2022-06-02 PROCEDURE — 83605 ASSAY OF LACTIC ACID: CPT | Performed by: EMERGENCY MEDICINE

## 2022-06-02 RX ORDER — BUMETANIDE 0.25 MG/ML
2 INJECTION INTRAMUSCULAR; INTRAVENOUS EVERY 12 HOURS
Status: DISCONTINUED | OUTPATIENT
Start: 2022-06-02 | End: 2022-06-03

## 2022-06-02 RX ORDER — SODIUM CHLORIDE 0.9 % (FLUSH) 0.9 %
10 SYRINGE (ML) INJECTION AS NEEDED
Status: DISCONTINUED | OUTPATIENT
Start: 2022-06-02 | End: 2022-06-10 | Stop reason: HOSPADM

## 2022-06-02 RX ORDER — BUMETANIDE 0.25 MG/ML
0.5 INJECTION INTRAMUSCULAR; INTRAVENOUS ONCE
Status: COMPLETED | OUTPATIENT
Start: 2022-06-02 | End: 2022-06-02

## 2022-06-02 RX ADMIN — IOPAMIDOL 80 ML: 755 INJECTION, SOLUTION INTRAVENOUS at 18:49

## 2022-06-02 RX ADMIN — BUMETANIDE 0.5 MG: 0.25 INJECTION INTRAMUSCULAR; INTRAVENOUS at 20:20

## 2022-06-02 RX ADMIN — NITROGLYCERIN 1 INCH: 20 OINTMENT TOPICAL at 20:20

## 2022-06-02 NOTE — ED PROVIDER NOTES
Subjective   Blane Dietz is a 85yo  male who presents to the ER for worsening SOB for the past month. Patient states today is the worst it has been which warranted him to come to the ED. Patient explains that he has had some vertigo and diarrhea. Patient stated that he is usually on 2L of O2 with nasal cannula at home, but last night switched it to a nonrebreather mask. Patient denies fever, chills, night sweats, unexpected weight loss, CP, palpitations, cough, N/V, constipiation, abd pain, urinary frequency or dysuria, dizziness, vision or hearing changes. Patient has a history of PE in February last year.  Patient reports he has not been compliant with his Eliquis.  Family at bedside reports they are working on getting him into an assisted living facility where medication administration will be monitored.      History provided by:  Patient      Review of Systems   Constitutional: Negative for fatigue, fever and unexpected weight change.   Respiratory: Positive for shortness of breath. Negative for cough and chest tightness.    Cardiovascular: Positive for leg swelling. Negative for chest pain and palpitations.   Gastrointestinal: Positive for diarrhea. Negative for abdominal pain, constipation, nausea and vomiting.   Genitourinary: Negative for dysuria and frequency.   Neurological: Positive for light-headedness. Negative for dizziness.   All other systems reviewed and are negative.      Past Medical History:   Diagnosis Date   • Aortic stenosis     status post ROSS procedure, remote, with December 2015 echocardiography revealing normal aortic and pulmonary valve function, normal ejection fraction and mild to moderate MR   • Arthritis    • Bilateral impacted cerumen 11/23/2016   • Bronchitis    • Chronic gout of right foot 6/13/2016    Impression: 03/08/2016 - stable.;    • COVID-19 vaccine series completed 05/12/2021    [place] 2nd dose 3/12/21.   • Depression    • Diabetes mellitus (HCC)    •  Diverticulitis    • Exogenous obesity    • Gout    • Heart murmur    • History of vitamin D deficiency    • Hypercholesteremia 8/11/2016   • Hyperlipidemia    • Hypertension    • Kidney lesion    • Malignant neoplasm of prostate (HCC)    • Morbid obesity (HCC) 8/11/2016   • Pneumonia 05/12/2021   • Primary osteoarthritis involving multiple joints 6/13/2016    Impression: 03/08/2016 - stable;    • Pulmonary embolism (HCC)    • Sleep apnea 05/12/2021    C-Pap   • Uncontrolled type 2 diabetes mellitus with hyperglycemia (HCC) 6/13/2016    Impression: 03/08/2016 - seeing Endo .;    • Vertigo        Allergies   Allergen Reactions   • Ampicillin Anaphylaxis   • Medrol [Methylprednisolone] Unknown - High Severity     Made his blood sugar get really high, can't take this   • Myrbetriq [Mirabegron] Unknown - High Severity     High BP   • Penicillins Anaphylaxis   • Bee Venom Swelling       Past Surgical History:   Procedure Laterality Date   • CARDIAC VALVE REPLACEMENT  05/12/2021    Ross procedure 22 years ago   • COLON SURGERY     • COLONOSCOPY     • COLOSTOMY  1999   • COLOSTOMY REVISION     • CYSTOSCOPY N/A 12/7/2021    Procedure: CYSTOSCOPY FLEXIBLE;  Surgeon: José Miguel Villafuerte Jr., MD;  Location:  [facility];  Service: Urology;  Laterality: N/A;   • EXPLORATORY LAPAROTOMY      With Tissue Removal   • LIPOMA EXCISION     • MOHS SURGERY     • NEPHROURETERECTOMY Right 12/7/2021    Procedure: RIGHT NEPHROURETERECTOMY LAPAROSCOPIC WITH DAVINCI ROBOT;  Surgeon: José Miguel Villafuerte Jr., MD;  Location:  [facility];  Service: Robotics - DaVinci;  Laterality: Right;   • OTHER SURGICAL HISTORY      Porcine Valve   • PROSTATE SURGERY     • PROSTATECTOMY  2000     History of Prostatectomy Perineal Radical   • SKIN CANCER EXCISION      from head and lip   • TONSILLECTOMY         Family History   Problem Relation Age of Onset   • Diabetes Mother    • Lung cancer Mother    • Cancer Mother    • Heart disease Father    • Breast cancer  Other    • Cancer Other    • Hypertension Other    • Migraines Other    • Obesity Other    • Diabetes Other        Social History     Socioeconomic History   • Marital status: Single   Tobacco Use   • Smoking status: Never Smoker   • Smokeless tobacco: Never Used   Vaping Use   • Vaping Use: Never used   Substance and Sexual Activity   • Alcohol use: No   • Drug use: No   • Sexual activity: Defer           Objective   Physical Exam  Vitals and nursing note reviewed.   Constitutional:       General: He is in acute distress.      Appearance: He is obese.   Cardiovascular:      Rate and Rhythm: Tachycardia present. Rhythm irregular.   Pulmonary:      Effort: Respiratory distress present.      Breath sounds: Normal breath sounds.   Abdominal:      Tenderness: There is no abdominal tenderness.   Musculoskeletal:      Right lower leg: Edema present.      Left lower leg: Edema present.   Skin:     Nails: There is no clubbing.   Neurological:      General: No focal deficit present.      Mental Status: He is alert and oriented to person, place, and time.   Psychiatric:         Mood and Affect: Mood normal.         Behavior: Behavior normal.         Procedures           ED Course  ED Course as of 06/02/22 2343   Thu Jun 02, 2022   1635 SpO2(!): 88 % [RS]   1635 Heart Rate: 119 [RS]   1712 XR Chest 1 View  Personally reviewed syncope the chest demonstrating cardiomegaly with some pulmonary edema.  No focal lobar infiltrate.  See report from radiology for details. [RS]   1824 Troponin T(!!): 0.041 [RS]   1825 proBNP(!): 5,664.0 [RS]   2049 Patient with evidence of congestive heart failure with increased work of breathing, shortness of breath, and hypoxemia.  We will plan admission for further evaluation and management.  Hospitalist messaged for admission. [RS]   2056 Case discussed with Dr. Ozuna who agrees with the plan for admission. [RS]      ED Course User Index  [RS] Matthieu Velazquez MD                                                  MDM  Number of Diagnoses or Management Options  Acute on chronic congestive heart failure, unspecified heart failure type (HCC)  Diabetes mellitus type 2 in obese (HCC)  Elevated blood pressure reading with diagnosis of hypertension  Hypoxemia requiring supplemental oxygen  Noncompliance with medications  Diagnosis management comments: Recent Results (from the past 24 hour(s))  -COVID-19 and FLU A/B PCR - Swab, Nasopharynx:   Collection Time: 06/02/22  4:50 PM  Specimen: Nasopharynx; Swab       Result                      Value             Ref Range           COVID19                     Not Detected      Not Detected*       Influenza A PCR             Not Detected      Not Detected        Influenza B PCR             Not Detected      Not Detected   -Comprehensive Metabolic Panel:   Collection Time: 06/02/22  5:03 PM  Specimen: Blood       Result                      Value             Ref Range           Glucose                     277 (H)           65 - 99 mg/dL       BUN                         14                8 - 23 mg/dL        Creatinine                  1.15              0.76 - 1.27 *       Sodium                      140               136 - 145 mm*       Potassium                   4.2               3.5 - 5.2 mm*       Chloride                    102               98 - 107 mmo*       CO2                         27.0              22.0 - 29.0 *       Calcium                     9.2               8.6 - 10.5 m*       Total Protein               7.3               6.0 - 8.5 g/*       Albumin                     4.20              3.50 - 5.20 *       ALT (SGPT)                  48 (H)            1 - 41 U/L          AST (SGOT)                  48 (H)            1 - 40 U/L          Alkaline Phosphatase        227 (H)           39 - 117 U/L        Total Bilirubin             1.2               0.0 - 1.2 mg*       Globulin                    3.1               gm/dL               A/G Ratio                    1.4               g/dL                BUN/Creatinine Ratio        12.2              7.0 - 25.0          Anion Gap                   11.0              5.0 - 15.0 m*       eGFR                        62.8              >60.0 mL/min*  -BNP:   Collection Time: 06/02/22  5:03 PM  Specimen: Blood       Result                      Value             Ref Range           proBNP                      5,664.0 (H)       0.0 - 1,800.*  -Troponin:   Collection Time: 06/02/22  5:03 PM  Specimen: Blood       Result                      Value             Ref Range           Troponin T                  0.041 (C)         0.000 - 0.03*  -Green Top (Gel):   Collection Time: 06/02/22  5:03 PM       Result                      Value             Ref Range           Extra Tube                                                    Hold for add-ons.  -Lavender Top:   Collection Time: 06/02/22  5:03 PM       Result                      Value             Ref Range           Extra Tube                                                    hold for add-on  -Gold Top - SST:   Collection Time: 06/02/22  5:03 PM       Result                      Value             Ref Range           Extra Tube                                                    Hold for add-ons.  -Gray Top:   Collection Time: 06/02/22  5:03 PM       Result                      Value             Ref Range           Extra Tube                                                    Hold for add-ons.  -Light Blue Top:   Collection Time: 06/02/22  5:03 PM       Result                      Value             Ref Range           Extra Tube                                                    Hold for add-ons.  -CBC Auto Differential:   Collection Time: 06/02/22  5:03 PM  Specimen: Blood       Result                      Value             Ref Range           WBC                         9.69              3.40 - 10.80*       RBC                         4.73              4.14 - 5.80 *       Hemoglobin                   12.9 (L)          13.0 - 17.7 *       Hematocrit                  41.3              37.5 - 51.0 %       MCV                         87.3              79.0 - 97.0 *       MCH                         27.3              26.6 - 33.0 *       MCHC                        31.2 (L)          31.5 - 35.7 *       RDW                         15.1              12.3 - 15.4 %       RDW-SD                      47.5              37.0 - 54.0 *       MPV                         10.2              6.0 - 12.0 fL       Platelets                   188               140 - 450 10*       Neutrophil %                72.7              42.7 - 76.0 %       Lymphocyte %                13.4 (L)          19.6 - 45.3 %       Monocyte %                  7.2               5.0 - 12.0 %        Eosinophil %                2.2               0.3 - 6.2 %         Basophil %                  2.2 (H)           0.0 - 1.5 %         Immature Grans %            2.3 (H)           0.0 - 0.5 %         Neutrophils, Absolute       7.05 (H)          1.70 - 7.00 *       Lymphocytes, Absolute       1.30              0.70 - 3.10 *       Monocytes, Absolute         0.70              0.10 - 0.90 *       Eosinophils, Absolute       0.21              0.00 - 0.40 *       Basophils, Absolute         0.21 (H)          0.00 - 0.20 *       Immature Grans, Absolu*     0.22 (H)          0.00 - 0.05 *       nRBC                        0.0               0.0 - 0.2 /1*  -Lactate Dehydrogenase:   Collection Time: 06/02/22  5:03 PM  Specimen: Blood       Result                      Value             Ref Range           LDH                         304 (H)           135 - 225 U/L  -Procalcitonin:   Collection Time: 06/02/22  5:03 PM  Specimen: Blood       Result                      Value             Ref Range           Procalcitonin               0.11              0.00 - 0.25 *  -Fibrinogen:   Collection Time: 06/02/22  5:03 PM  Specimen: Blood       Result                       Value             Ref Range           Fibrinogen                  435               220 - 470 mg*  -Lactic Acid, Plasma:   Collection Time: 06/02/22  5:03 PM  Specimen: Blood       Result                      Value             Ref Range           Lactate                     1.6               0.5 - 2.0 mm*  -C-reactive Protein:   Collection Time: 06/02/22  5:03 PM  Specimen: Blood       Result                      Value             Ref Range           C-Reactive Protein          <0.30             0.00 - 0.50 *  -Ferritin:   Collection Time: 06/02/22  5:03 PM  Specimen: Blood       Result                      Value             Ref Range           Ferritin                    167.30            30.00 - 400.*  -Troponin:   Collection Time: 06/02/22  9:31 PM  Specimen: Blood       Result                      Value             Ref Range           Troponin T                  0.047 (C)         0.000 - 0.03*  Note: In addition to lab results from this visit, the labs listed above may include labs taken at another facility or during a different encounter within the last 24 hours. Please correlate lab times with ED admission and discharge times for further clarification of the services performed during this visit.    CT Angiogram Chest   Final Result         1. No evidence of acute pulmonary embolism. Mural calcination's of the    pulmonary trunk may indicate pulmonary arterial hypertension    2. Findings compatible with interstitial edema and moderate bilateral    pleural effusions    3. Cirrhotic morphology of the liver         This report was finalized on 6/2/2022 7:01 PM by rFancisco Hummel.          XR Chest 1 View   Final Result              1.  Cardiomegaly with pulmonary vascular congestion and bibasilar and    perihilar airspace disease favored to be due to pulmonary edema.    2.  Small bilateral pleural effusions.              This report was finalized on 6/2/2022 5:05 PM by Long Ayala MD.           ------------------------------------------------------            06/02/22 06/02/22 06/02/22 06/02/22               1830       1900      2059      2130     ------------------------------------------------------   BP:     (!) 167/114  155/90             160/100     BP Location:                                            Patient Position:                                            Pulse:      112       110                           Resp:                                               Temp:                                               TempSrc:                                            SpO2:       93%       94%       97%       98%       Weight:                                             Height:                                            ------------------------------------------------------  Medications  sodium chloride 0.9 % flush 10 mL (has no administration in time range)  bumetanide (BUMEX) injection 2 mg (has no administration in time range)  iopamidol (ISOVUE-370) 76 % injection 100 mL (80 mL Intravenous Given 6/2/22 1849)  bumetanide (BUMEX) injection 0.5 mg (0.5 mg Intravenous Given 6/2/22 2020)  nitroglycerin (NITROSTAT) ointment 1 inch (1 inch Topical Given 6/2/22 2020)  ECG/EMG Results (last 24 hours)     Procedure Component Value Units Date/Time    ECG 12 Lead ([id number]) Collected: 06/02/22 1652     Updated: 06/02/22 1653    ECG 12 Lead ([id number]) Collected: 06/02/22 2137     Updated: 06/02/22 2137      ECG 12 Lead   Preliminary Result         ECG 12 Lead   Preliminary Result                Amount and/or Complexity of Data Reviewed  Clinical lab tests: reviewed  Tests in the radiology section of CPT®: reviewed  Decide to obtain previous medical records or to obtain history from someone other than the patient: yes  Obtain history from someone other than the patient: yes  Discuss the patient with other providers:  yes  Independent visualization of images, tracings, or specimens: yes        Final diagnoses:   Acute on chronic congestive heart failure, unspecified heart failure type (HCC)   Hypoxemia requiring supplemental oxygen   Diabetes mellitus type 2 in obese (HCC)   Elevated blood pressure reading with diagnosis of hypertension   Noncompliance with medications       ED Disposition  ED Disposition     ED Disposition   Decision to Admit    Condition   --    Comment   Level of Care: Telemetry [5]   Diagnosis: Acute on chronic congestive heart failure, unspecified heart failure type (HCC) [[id number]]   Admitting Physician: VELIA BURRELL [[id number]]   Attending Physician: VELIA BURRELL [[id number]]   Bed Request Comments: tele               No follow-up provider specified.       Medication List      No changes were made to your prescriptions during this visit.          Matthieu Velazquez MD  06/02/22 2306

## 2022-06-03 ENCOUNTER — APPOINTMENT (OUTPATIENT)
Dept: ULTRASOUND IMAGING | Facility: HOSPITAL | Age: 87
End: 2022-06-03

## 2022-06-03 ENCOUNTER — APPOINTMENT (OUTPATIENT)
Dept: CARDIOLOGY | Facility: HOSPITAL | Age: 87
End: 2022-06-03

## 2022-06-03 PROBLEM — R77.8 ELEVATED TROPONIN: Status: ACTIVE | Noted: 2022-06-03

## 2022-06-03 PROBLEM — J90 PLEURAL EFFUSION, BILATERAL: Status: ACTIVE | Noted: 2022-06-03

## 2022-06-03 PROBLEM — K74.60 CIRRHOSIS OF LIVER (HCC): Status: ACTIVE | Noted: 2022-06-03

## 2022-06-03 PROBLEM — Z86.711 HISTORY OF PULMONARY EMBOLISM: Status: ACTIVE | Noted: 2022-06-03

## 2022-06-03 PROBLEM — R74.01 ELEVATED TRANSAMINASE LEVEL: Status: ACTIVE | Noted: 2022-06-03

## 2022-06-03 PROBLEM — R79.89 ELEVATED TROPONIN: Status: ACTIVE | Noted: 2022-06-03

## 2022-06-03 PROBLEM — J96.21 ACUTE ON CHRONIC RESPIRATORY FAILURE WITH HYPOXIA (HCC): Status: ACTIVE | Noted: 2021-03-28

## 2022-06-03 PROBLEM — D64.9 NORMOCYTIC ANEMIA: Status: ACTIVE | Noted: 2022-06-03

## 2022-06-03 LAB
ANION GAP SERPL CALCULATED.3IONS-SCNC: 10 MMOL/L (ref 5–15)
ASCENDING AORTA: 3.7 CM
BASOPHILS # BLD AUTO: 0.18 10*3/MM3 (ref 0–0.2)
BASOPHILS NFR BLD AUTO: 1.5 % (ref 0–1.5)
BH CV ECHO LEFT VENTRICLE GLOBAL LONGITUDINAL STRAIN: -13.7 %
BH CV ECHO MEAS - AI P1/2T: 339.5 MSEC
BH CV ECHO MEAS - AO MAX PG: 4 MMHG
BH CV ECHO MEAS - AO MEAN PG: 3 MMHG
BH CV ECHO MEAS - AO ROOT DIAM: 3.2 CM
BH CV ECHO MEAS - AO V2 MAX: 105.1 CM/SEC
BH CV ECHO MEAS - AO V2 VTI: 20.2 CM
BH CV ECHO MEAS - AVA(I,D): 2.9 CM2
BH CV ECHO MEAS - EDV(CUBED): 97.2 ML
BH CV ECHO MEAS - EDV(MOD-SP2): 133 ML
BH CV ECHO MEAS - EDV(MOD-SP4): 134 ML
BH CV ECHO MEAS - EF(MOD-BP): 52 %
BH CV ECHO MEAS - EF(MOD-SP2): 49.8 %
BH CV ECHO MEAS - EF(MOD-SP4): 53.3 %
BH CV ECHO MEAS - ESV(CUBED): 44.8 ML
BH CV ECHO MEAS - ESV(MOD-SP2): 66.7 ML
BH CV ECHO MEAS - ESV(MOD-SP4): 62.6 ML
BH CV ECHO MEAS - FS: 22.8 %
BH CV ECHO MEAS - IVS/LVPW: 1.09 CM
BH CV ECHO MEAS - IVSD: 1.42 CM
BH CV ECHO MEAS - LA DIMENSION: 4.3 CM
BH CV ECHO MEAS - LAT PEAK E' VEL: 7.3 CM/SEC
BH CV ECHO MEAS - LV MASS(C)D: 246.4 GRAMS
BH CV ECHO MEAS - LV MAX PG: 2.5 MMHG
BH CV ECHO MEAS - LV MEAN PG: 1.49 MMHG
BH CV ECHO MEAS - LV V1 MAX: 79.1 CM/SEC
BH CV ECHO MEAS - LV V1 VTI: 15.5 CM
BH CV ECHO MEAS - LVIDD: 4.6 CM
BH CV ECHO MEAS - LVIDS: 3.6 CM
BH CV ECHO MEAS - LVOT AREA: 3.9 CM2
BH CV ECHO MEAS - LVOT DIAM: 2.22 CM
BH CV ECHO MEAS - LVPWD: 1.3 CM
BH CV ECHO MEAS - MED PEAK E' VEL: 6.7 CM/SEC
BH CV ECHO MEAS - MR MAX PG: 103.5 MMHG
BH CV ECHO MEAS - MR MAX VEL: 508.7 CM/SEC
BH CV ECHO MEAS - MR MEAN PG: 63.9 MMHG
BH CV ECHO MEAS - MR MEAN VEL: 362.2 CM/SEC
BH CV ECHO MEAS - MR VTI: 150.5 CM
BH CV ECHO MEAS - MV DEC SLOPE: 437.4 CM/SEC2
BH CV ECHO MEAS - MV DEC TIME: 0.21 MSEC
BH CV ECHO MEAS - MV E MAX VEL: 116 CM/SEC
BH CV ECHO MEAS - MV MAX PG: 5.1 MMHG
BH CV ECHO MEAS - MV MEAN PG: 2.23 MMHG
BH CV ECHO MEAS - MV P1/2T: 75 MSEC
BH CV ECHO MEAS - MV V2 VTI: 29.7 CM
BH CV ECHO MEAS - MVA(P1/2T): 2.9 CM2
BH CV ECHO MEAS - MVA(VTI): 2.01 CM2
BH CV ECHO MEAS - PA ACC TIME: 0.13 SEC
BH CV ECHO MEAS - PA PR(ACCEL): 18.8 MMHG
BH CV ECHO MEAS - PA V2 MAX: 86.9 CM/SEC
BH CV ECHO MEAS - PI END-D VEL: 118.1 CM/SEC
BH CV ECHO MEAS - RAP SYSTOLE: 15 MMHG
BH CV ECHO MEAS - RF(MV,LVOT)(1DIAM): 0.62 CM
BH CV ECHO MEAS - RVSP: 52 MMHG
BH CV ECHO MEAS - SV(LVOT): 59.8 ML
BH CV ECHO MEAS - SV(MOD-SP2): 66.3 ML
BH CV ECHO MEAS - SV(MOD-SP4): 71.4 ML
BH CV ECHO MEAS - TAPSE (>1.6): 1.29 CM
BH CV ECHO MEAS - TR MAX PG: 37 MMHG
BH CV ECHO MEAS - TR MAX VEL: 305 CM/SEC
BH CV ECHO MEASUREMENTS AVERAGE E/E' RATIO: 16.57
BH CV VAS BP LEFT ARM: NORMAL MMHG
BH CV XLRA - RV BASE: 4.3 CM
BH CV XLRA - RV LENGTH: 7 CM
BH CV XLRA - RV MID: 3 CM
BH CV XLRA - TDI S': 11.3 CM/SEC
BUN SERPL-MCNC: 21 MG/DL (ref 8–23)
BUN/CREAT SERPL: 16.4 (ref 7–25)
CALCIUM SPEC-SCNC: 10.1 MG/DL (ref 8.6–10.5)
CHLORIDE SERPL-SCNC: 101 MMOL/L (ref 98–107)
CHOLEST SERPL-MCNC: 169 MG/DL (ref 0–200)
CO2 SERPL-SCNC: 30 MMOL/L (ref 22–29)
CREAT SERPL-MCNC: 1.28 MG/DL (ref 0.76–1.27)
DEPRECATED RDW RBC AUTO: 48.5 FL (ref 37–54)
EGFRCR SERPLBLD CKD-EPI 2021: 55.2 ML/MIN/1.73
EOSINOPHIL # BLD AUTO: 0.09 10*3/MM3 (ref 0–0.4)
EOSINOPHIL NFR BLD AUTO: 0.8 % (ref 0.3–6.2)
ERYTHROCYTE [DISTWIDTH] IN BLOOD BY AUTOMATED COUNT: 15.3 % (ref 12.3–15.4)
GLUCOSE BLDC GLUCOMTR-MCNC: 130 MG/DL (ref 70–130)
GLUCOSE BLDC GLUCOMTR-MCNC: 198 MG/DL (ref 70–130)
GLUCOSE BLDC GLUCOMTR-MCNC: 260 MG/DL (ref 70–130)
GLUCOSE BLDC GLUCOMTR-MCNC: 296 MG/DL (ref 70–130)
GLUCOSE BLDC GLUCOMTR-MCNC: 319 MG/DL (ref 70–130)
GLUCOSE SERPL-MCNC: 127 MG/DL (ref 65–99)
HAV IGM SERPL QL IA: NORMAL
HBA1C MFR BLD: 9.1 % (ref 4.8–5.6)
HBV CORE IGM SERPL QL IA: NORMAL
HBV SURFACE AG SERPL QL IA: NORMAL
HCT VFR BLD AUTO: 39.8 % (ref 37.5–51)
HCV AB SER DONR QL: NORMAL
HDLC SERPL-MCNC: 74 MG/DL (ref 40–60)
HGB BLD-MCNC: 12.2 G/DL (ref 13–17.7)
IMM GRANULOCYTES # BLD AUTO: 0.17 10*3/MM3 (ref 0–0.05)
IMM GRANULOCYTES NFR BLD AUTO: 1.5 % (ref 0–0.5)
LDLC SERPL CALC-MCNC: 78 MG/DL (ref 0–100)
LDLC/HDLC SERPL: 1.02 {RATIO}
LEFT ATRIUM VOLUME INDEX: 40.1 ML/M2
LYMPHOCYTES # BLD AUTO: 1.22 10*3/MM3 (ref 0.7–3.1)
LYMPHOCYTES NFR BLD AUTO: 10.5 % (ref 19.6–45.3)
MAGNESIUM SERPL-MCNC: 2 MG/DL (ref 1.6–2.4)
MAXIMAL PREDICTED HEART RATE: 136 BPM
MCH RBC QN AUTO: 26.9 PG (ref 26.6–33)
MCHC RBC AUTO-ENTMCNC: 30.7 G/DL (ref 31.5–35.7)
MCV RBC AUTO: 87.9 FL (ref 79–97)
MONOCYTES # BLD AUTO: 1.08 10*3/MM3 (ref 0.1–0.9)
MONOCYTES NFR BLD AUTO: 9.3 % (ref 5–12)
NEUTROPHILS NFR BLD AUTO: 76.4 % (ref 42.7–76)
NEUTROPHILS NFR BLD AUTO: 8.89 10*3/MM3 (ref 1.7–7)
NRBC BLD AUTO-RTO: 0 /100 WBC (ref 0–0.2)
PLATELET # BLD AUTO: 210 10*3/MM3 (ref 140–450)
PMV BLD AUTO: 10.5 FL (ref 6–12)
POTASSIUM SERPL-SCNC: 4.2 MMOL/L (ref 3.5–5.2)
QT INTERVAL: 328 MS
QT INTERVAL: 418 MS
QTC INTERVAL: 459 MS
QTC INTERVAL: 578 MS
RBC # BLD AUTO: 4.53 10*6/MM3 (ref 4.14–5.8)
SODIUM SERPL-SCNC: 141 MMOL/L (ref 136–145)
STRESS TARGET HR: 116 BPM
TRIGL SERPL-MCNC: 97 MG/DL (ref 0–150)
TROPONIN T SERPL-MCNC: 0.05 NG/ML (ref 0–0.03)
TSH SERPL DL<=0.05 MIU/L-ACNC: 1.58 UIU/ML (ref 0.27–4.2)
VLDLC SERPL-MCNC: 17 MG/DL (ref 5–40)
WBC NRBC COR # BLD: 11.63 10*3/MM3 (ref 3.4–10.8)

## 2022-06-03 PROCEDURE — G0378 HOSPITAL OBSERVATION PER HR: HCPCS

## 2022-06-03 PROCEDURE — 93005 ELECTROCARDIOGRAM TRACING: CPT | Performed by: INTERNAL MEDICINE

## 2022-06-03 PROCEDURE — 82962 GLUCOSE BLOOD TEST: CPT

## 2022-06-03 PROCEDURE — 93356 MYOCRD STRAIN IMG SPCKL TRCK: CPT

## 2022-06-03 PROCEDURE — 85025 COMPLETE CBC W/AUTO DIFF WBC: CPT | Performed by: INTERNAL MEDICINE

## 2022-06-03 PROCEDURE — 93971 EXTREMITY STUDY: CPT

## 2022-06-03 PROCEDURE — 80048 BASIC METABOLIC PNL TOTAL CA: CPT | Performed by: INTERNAL MEDICINE

## 2022-06-03 PROCEDURE — 80061 LIPID PANEL: CPT | Performed by: INTERNAL MEDICINE

## 2022-06-03 PROCEDURE — 96366 THER/PROPH/DIAG IV INF ADDON: CPT

## 2022-06-03 PROCEDURE — 96365 THER/PROPH/DIAG IV INF INIT: CPT

## 2022-06-03 PROCEDURE — 76705 ECHO EXAM OF ABDOMEN: CPT

## 2022-06-03 PROCEDURE — 84443 ASSAY THYROID STIM HORMONE: CPT | Performed by: INTERNAL MEDICINE

## 2022-06-03 PROCEDURE — 84484 ASSAY OF TROPONIN QUANT: CPT | Performed by: INTERNAL MEDICINE

## 2022-06-03 PROCEDURE — 93306 TTE W/DOPPLER COMPLETE: CPT

## 2022-06-03 PROCEDURE — 96372 THER/PROPH/DIAG INJ SC/IM: CPT

## 2022-06-03 PROCEDURE — 97165 OT EVAL LOW COMPLEX 30 MIN: CPT

## 2022-06-03 PROCEDURE — 63710000001 INSULIN LISPRO (HUMAN) PER 5 UNITS: Performed by: INTERNAL MEDICINE

## 2022-06-03 PROCEDURE — 96376 TX/PRO/DX INJ SAME DRUG ADON: CPT

## 2022-06-03 PROCEDURE — 83036 HEMOGLOBIN GLYCOSYLATED A1C: CPT | Performed by: INTERNAL MEDICINE

## 2022-06-03 PROCEDURE — 93010 ELECTROCARDIOGRAM REPORT: CPT | Performed by: INTERNAL MEDICINE

## 2022-06-03 PROCEDURE — 63710000001 INSULIN DETEMIR PER 5 UNITS: Performed by: INTERNAL MEDICINE

## 2022-06-03 PROCEDURE — 94660 CPAP INITIATION&MGMT: CPT

## 2022-06-03 PROCEDURE — 83735 ASSAY OF MAGNESIUM: CPT | Performed by: INTERNAL MEDICINE

## 2022-06-03 PROCEDURE — 94799 UNLISTED PULMONARY SVC/PX: CPT

## 2022-06-03 PROCEDURE — 97161 PT EVAL LOW COMPLEX 20 MIN: CPT

## 2022-06-03 PROCEDURE — 25010000002 ENOXAPARIN PER 10 MG: Performed by: INTERNAL MEDICINE

## 2022-06-03 PROCEDURE — 99220 PR INITIAL OBSERVATION CARE/DAY 70 MINUTES: CPT | Performed by: INTERNAL MEDICINE

## 2022-06-03 RX ORDER — INSULIN LISPRO 100 [IU]/ML
0-9 INJECTION, SOLUTION INTRAVENOUS; SUBCUTANEOUS
Status: DISCONTINUED | OUTPATIENT
Start: 2022-06-03 | End: 2022-06-10 | Stop reason: HOSPADM

## 2022-06-03 RX ORDER — DEXTROSE MONOHYDRATE 25 G/50ML
25 INJECTION, SOLUTION INTRAVENOUS
Status: DISCONTINUED | OUTPATIENT
Start: 2022-06-03 | End: 2022-06-10 | Stop reason: HOSPADM

## 2022-06-03 RX ORDER — METOPROLOL SUCCINATE 100 MG/1
100 TABLET, EXTENDED RELEASE ORAL DAILY
Status: DISCONTINUED | OUTPATIENT
Start: 2022-06-03 | End: 2022-06-10 | Stop reason: HOSPADM

## 2022-06-03 RX ORDER — ACETAMINOPHEN 160 MG/5ML
650 SOLUTION ORAL EVERY 4 HOURS PRN
Status: DISCONTINUED | OUTPATIENT
Start: 2022-06-03 | End: 2022-06-10 | Stop reason: HOSPADM

## 2022-06-03 RX ORDER — HYDROCODONE BITARTRATE AND ACETAMINOPHEN 7.5; 325 MG/1; MG/1
1 TABLET ORAL EVERY 6 HOURS PRN
Status: DISCONTINUED | OUTPATIENT
Start: 2022-06-03 | End: 2022-06-10 | Stop reason: HOSPADM

## 2022-06-03 RX ORDER — ACETAMINOPHEN 325 MG/1
650 TABLET ORAL EVERY 4 HOURS PRN
Status: DISCONTINUED | OUTPATIENT
Start: 2022-06-03 | End: 2022-06-10 | Stop reason: HOSPADM

## 2022-06-03 RX ORDER — CHOLECALCIFEROL (VITAMIN D3) 125 MCG
5 CAPSULE ORAL NIGHTLY PRN
Status: DISCONTINUED | OUTPATIENT
Start: 2022-06-03 | End: 2022-06-09

## 2022-06-03 RX ORDER — BUMETANIDE 0.25 MG/ML
1 INJECTION INTRAMUSCULAR; INTRAVENOUS EVERY 12 HOURS
Status: COMPLETED | OUTPATIENT
Start: 2022-06-03 | End: 2022-06-04

## 2022-06-03 RX ORDER — DOCUSATE SODIUM 100 MG/1
100 CAPSULE, LIQUID FILLED ORAL DAILY PRN
Status: DISCONTINUED | OUTPATIENT
Start: 2022-06-03 | End: 2022-06-04

## 2022-06-03 RX ORDER — SODIUM CHLORIDE 0.9 % (FLUSH) 0.9 %
10 SYRINGE (ML) INJECTION EVERY 12 HOURS SCHEDULED
Status: DISCONTINUED | OUTPATIENT
Start: 2022-06-03 | End: 2022-06-08

## 2022-06-03 RX ORDER — NITROGLYCERIN 20 MG/100ML
5-200 INJECTION INTRAVENOUS
Status: DISCONTINUED | OUTPATIENT
Start: 2022-06-03 | End: 2022-06-03

## 2022-06-03 RX ORDER — BUMETANIDE 0.25 MG/ML
1 INJECTION INTRAMUSCULAR; INTRAVENOUS EVERY 12 HOURS
Status: COMPLETED | OUTPATIENT
Start: 2022-06-03 | End: 2022-06-03

## 2022-06-03 RX ORDER — ALLOPURINOL 300 MG/1
300 TABLET ORAL DAILY
Status: DISCONTINUED | OUTPATIENT
Start: 2022-06-03 | End: 2022-06-10 | Stop reason: HOSPADM

## 2022-06-03 RX ORDER — AMLODIPINE BESYLATE 5 MG/1
5 TABLET ORAL DAILY
Status: DISCONTINUED | OUTPATIENT
Start: 2022-06-03 | End: 2022-06-09

## 2022-06-03 RX ORDER — SODIUM CHLORIDE 0.9 % (FLUSH) 0.9 %
10 SYRINGE (ML) INJECTION AS NEEDED
Status: DISCONTINUED | OUTPATIENT
Start: 2022-06-03 | End: 2022-06-08

## 2022-06-03 RX ORDER — FLUTICASONE PROPIONATE 50 MCG
2 SPRAY, SUSPENSION (ML) NASAL DAILY
Status: DISCONTINUED | OUTPATIENT
Start: 2022-06-03 | End: 2022-06-10 | Stop reason: HOSPADM

## 2022-06-03 RX ORDER — ENOXAPARIN SODIUM 100 MG/ML
40 INJECTION SUBCUTANEOUS
Status: DISCONTINUED | OUTPATIENT
Start: 2022-06-03 | End: 2022-06-03

## 2022-06-03 RX ORDER — NICOTINE POLACRILEX 4 MG
15 LOZENGE BUCCAL
Status: DISCONTINUED | OUTPATIENT
Start: 2022-06-03 | End: 2022-06-10 | Stop reason: HOSPADM

## 2022-06-03 RX ORDER — ACETAMINOPHEN 650 MG/1
650 SUPPOSITORY RECTAL EVERY 4 HOURS PRN
Status: DISCONTINUED | OUTPATIENT
Start: 2022-06-03 | End: 2022-06-10 | Stop reason: HOSPADM

## 2022-06-03 RX ADMIN — INSULIN LISPRO 6 UNITS: 100 INJECTION, SOLUTION INTRAVENOUS; SUBCUTANEOUS at 09:15

## 2022-06-03 RX ADMIN — EMPAGLIFLOZIN 10 MG: 10 TABLET, FILM COATED ORAL at 18:41

## 2022-06-03 RX ADMIN — FLUTICASONE PROPIONATE 2 SPRAY: 50 SPRAY, METERED NASAL at 09:15

## 2022-06-03 RX ADMIN — INSULIN LISPRO 6 UNITS: 100 INJECTION, SOLUTION INTRAVENOUS; SUBCUTANEOUS at 13:17

## 2022-06-03 RX ADMIN — APIXABAN 5 MG: 5 TABLET, FILM COATED ORAL at 21:29

## 2022-06-03 RX ADMIN — ENOXAPARIN SODIUM 40 MG: 40 INJECTION SUBCUTANEOUS at 09:15

## 2022-06-03 RX ADMIN — METOPROLOL SUCCINATE 100 MG: 100 TABLET, EXTENDED RELEASE ORAL at 09:15

## 2022-06-03 RX ADMIN — BUMETANIDE 1 MG: 0.25 INJECTION INTRAMUSCULAR; INTRAVENOUS at 16:23

## 2022-06-03 RX ADMIN — Medication 10 ML: at 09:14

## 2022-06-03 RX ADMIN — Medication 10 ML: at 01:47

## 2022-06-03 RX ADMIN — Medication 10 ML: at 21:29

## 2022-06-03 RX ADMIN — AMLODIPINE BESYLATE 5 MG: 5 TABLET ORAL at 09:15

## 2022-06-03 RX ADMIN — INSULIN DETEMIR 20 UNITS: 100 INJECTION, SOLUTION SUBCUTANEOUS at 21:29

## 2022-06-03 RX ADMIN — INSULIN DETEMIR 20 UNITS: 100 INJECTION, SOLUTION SUBCUTANEOUS at 01:38

## 2022-06-03 RX ADMIN — SACUBITRIL AND VALSARTAN 1 TABLET: 24; 26 TABLET, FILM COATED ORAL at 21:29

## 2022-06-03 RX ADMIN — HYDROCHLOROTHIAZIDE: 25 TABLET ORAL at 09:15

## 2022-06-03 RX ADMIN — ALLOPURINOL 300 MG: 300 TABLET ORAL at 09:15

## 2022-06-03 RX ADMIN — NITROGLYCERIN 5 MCG/MIN: 20 INJECTION INTRAVENOUS at 01:39

## 2022-06-03 RX ADMIN — INSULIN LISPRO 2 UNITS: 100 INJECTION, SOLUTION INTRAVENOUS; SUBCUTANEOUS at 18:41

## 2022-06-03 RX ADMIN — BUMETANIDE 1 MG: 0.25 INJECTION, SOLUTION INTRAMUSCULAR; INTRAVENOUS at 11:27

## 2022-06-03 NOTE — CONSULTS
Cardiology Consult - [facility]    Blane Dietz     [id number]  1935  [address] [place] [id number]         Admission Date:  6/2/2022    Consultation Date:  06/03/22        PCP:  David Em MD  Referring MD:  Dr. Ozuna  Consulting MD:  Dr. 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    Medications Prior to Admission   Medication Sig Dispense Refill Last Dose   • allopurinol (ZYLOPRIM) 300 MG tablet TAKE 1 TABLET BY MOUTH ONE TIME A DAY 90 tablet 0 Past Week at Unknown time   • amLODIPine (NORVASC) 5 MG tablet Take 1 tablet by mouth Daily. 30 tablet 1 Past Week at Unknown time   • apixaban (ELIQUIS) 5 MG tablet tablet Take 1 tablet by mouth Every 12 (Twelve) Hours. Appointment needed for additional refills 60 tablet 2 Past Week at Unknown time   • Cholecalciferol (VITAMIN D3) 2000 UNITS capsule Take  by mouth 2 (two) times a day.   Past Week at Unknown time   • Cinnamon 500 MG tablet Take 2,000 tablets by mouth Daily.   Past Week at Unknown time   • Coenzyme Q10 100 MG tablet Take 2 tablets by mouth Daily.   Past Week at Unknown time   • docusate sodium (Colace) 100 MG capsule Take 1 capsule by  mouth Daily As Needed for Constipation. 30 capsule 1 Past Week at Unknown time   • fenofibric acid (TRILIPIX) 135 MG capsule delayed-release delayed release capsule TAKE 1 CAPSULE BY MOUTH ONE TIME A DAY  (Patient taking differently: Take 135 mg by mouth Daily.) 30 capsule 2 Past Week at Unknown time   • fluticasone (FLONASE) 50 MCG/ACT nasal spray 2 sprays into the nostril(s) as directed by provider Daily. 2 puffs each nostril (Patient taking differently: 2 sprays into the nostril(s) as directed by provider Daily As Needed for Rhinitis or Allergies. 2 puffs each nostril) 1 bottle 0 Past Week at Unknown time   • Jardiance 10 MG tablet tablet Take 1 tablet by mouth Daily. Appointment needed for additional refills 30 tablet 0 Past Week at Unknown time   • l-methylfolate-algae-B6-B12 (METANX) 3-90.314-2-35 MG capsule capsule TAKE 1 CAPSULE BY MOUTH TWICE A DAY. (Patient taking differently: Take 1 capsule by mouth 2 (Two) Times a Day.) 180 capsule 1 Past Week at Unknown time   • Lantus 100 UNIT/ML injection Up to 50 units daily 20 mL 2 Past Week at Unknown time   • LECITHIN PO Take 1,200 mg by mouth Daily.   Past Week at Unknown time   • losartan-hydrochlorothiazide (HYZAAR) 100-25 MG per tablet Take 1 tablet by mouth Daily.   Past Week at Unknown time   • metFORMIN ER (GLUCOPHAGE-XR) 500 MG 24 hr tablet Take 1 tablet by mouth 2 (Two) Times a Day. (Patient taking differently: Take 500 mg by mouth Daily With Breakfast.) 60 tablet 5 Past Week at Unknown time   • methocarbamol (ROBAXIN) 500 MG tablet Take 1 tablet by mouth 3 (Three) Times a Day As Needed for Muscle Spasms. 30 tablet 0 Past Week at Unknown time   • metoprolol succinate XL (TOPROL-XL) 100 MG 24 hr tablet TAKE 1 TABLET BY MOUTH EVERY DAY  (Patient taking differently: Take 100 mg by mouth Daily.) 90 tablet 0 Past Week at Unknown time   • ondansetron (ZOFRAN) 8 MG tablet Take 1 tablet by mouth 3 (Three) Times a Day As Needed for Nausea or Vomiting. 30 tablet 5  "Past Week at Unknown time   • polyethylene glycol (MIRALAX) 17 GM/SCOOP powder Dissolve 17 grams (1 capful) in 8 ounces of water and drink Daily. 238 g 0 Past Week at Unknown time   • pravastatin (PRAVACHOL) 40 MG tablet TAKE ONE TABLET BY MOUTH EVERY NIGHT AT BEDTIME (Patient taking differently: Take 40 mg by mouth Every Night.) 90 tablet 0 Past Week at Unknown time   • triamterene-hydrochlorothiazide (MAXZIDE) 75-50 MG per tablet Take 1 tablet by mouth Daily.   Past Week at Unknown time   • BD Insulin Syringe U/F 31G X 5/16\" 0.5 ML misc       • Dulaglutide (Trulicity) 1.5 MG/0.5ML solution pen-injector Inject 1.5 mg under the skin into the appropriate area as directed 1 (One) Time Per Week. NO FURTHER REFILLS WITHOUT APPT 2 mL 0    • furosemide (LASIX) 40 MG tablet    Unknown at Unknown time   • HYDROcodone-acetaminophen (NORCO) 7.5-325 MG per tablet Take 1 tablet by mouth Every 6 (Six) Hours As Needed for Moderate Pain . 30 tablet 0 Unknown at Unknown time   • leuprolide (LUPRON) 22.5 MG injection Inject 22.5 mg into the appropriate muscle as directed by prescriber Every 3 (Three) Months.   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      He presents to [facility] ED yesterday evening with labored breathing and requiring 24/7 O2 support at home.  He is accompanied by his daughter who reports the dyspnea has progressed over the last 4-5 weeks along with increased pedal edema and weakness.  He was noted to be 88%O2 on 2L, was able to get above 90% at 4L.  " He was started on IV diuretics in ED.  CTA negative for PE.  He has been admitted to hospitalist service.      Cardiology has been asked to follow in consultation.  He is currently getting 1mg IV Bumex twice daily and reports breathing is better.  He used BiPAP yesterday and thinks it's better than CPAP.  He denies chest pain, denies palpitations or dizziness.  He is currently on nasal cannula.  Reports BLE edema for the last week - L>R.  He admits to hitting his Right shin on a cardboard box - bandage intact.  He doesn't know why he hasn't taken his medications - they are sitting out and he just doesn't take them.  He reports he's in process of moving to assisted living facility where they will manage his medications.  He hasn't taken medications in about 10 days.        Social History     Socioeconomic History   • Marital status: Single   Tobacco Use   • Smoking status: Never Smoker   • Smokeless tobacco: Never Used   Vaping Use   • Vaping Use: Never used   Substance and Sexual Activity   • Alcohol use: No   • Drug use: No   • Sexual activity: Defer     Family History   Problem Relation Age of Onset   • Diabetes Mother    • Lung cancer Mother    • Cancer Mother    • Heart disease Father    • Breast cancer Other    • Cancer Other    • Hypertension Other    • Migraines Other    • Obesity Other    • Diabetes Other      Past Surgical History:   Procedure Laterality Date   • CARDIAC VALVE REPLACEMENT  05/12/2021    Ross procedure 22 years ago   • COLON SURGERY     • COLONOSCOPY     • COLOSTOMY  1999   • COLOSTOMY REVISION     • CYSTOSCOPY N/A 12/7/2021    Procedure: CYSTOSCOPY FLEXIBLE;  Surgeon: José Miguel Villafuerte Jr., MD;  Location: [facility];  Service: Urology;  Laterality: N/A;   • EXPLORATORY LAPAROTOMY      With Tissue Removal   • LIPOMA EXCISION     • MOHS SURGERY     • NEPHROURETERECTOMY Right 12/7/2021    Procedure: RIGHT NEPHROURETERECTOMY LAPAROSCOPIC WITH DAVINCI ROBOT;  Surgeon: José Miguel Villafuerte  "MD No;  Location: [facility];  Service: Robotics - DaVinci;  Laterality: Right;   • OTHER SURGICAL HISTORY      Porcine Valve   • PROSTATE SURGERY     • PROSTATECTOMY  2000     History of Prostatectomy Perineal Radical   • SKIN CANCER EXCISION      from head and lip   • TONSILLECTOMY       ROS: Pertinent items are noted in HPI, all other systems reviewed and negative     Objective     height is 167.6 cm (66\") and weight is 84.8 kg (187 lb). His axillary temperature is 98.1 °F (36.7 °C). His blood pressure is 145/93 and his pulse is 92. His respiration is 18 and oxygen saturation is 97%.      Intake/Output Summary (Last 24 hours) at 6/3/2022 0847  Last data filed at 6/3/2022 0145  Gross per 24 hour   Intake 240 ml   Output 130 ml   Net 110 ml     Intake/Output                 06/02/22 0701 - 06/03/22 0700     [id number] Total              Intake    P.O.  --  240 240    Total Intake -- 240 240       Output    Urine  --  130 130    Total Output -- 130 130             06/02/22  1627 06/03/22  0111   Weight: 85.3 kg (188 lb) 84.8 kg (187 lb)          Physical Exam:  General Appearance:    Alert, cooperative, in no acute distress   Head:    Normocephalic, without obvious abnormality, atraumatic   Eyes:            Lids and lashes normal, conjunctivae and sclerae normal, no   icterus, no pallor, corneas clear, PERRLA   Ears:    Ears appear intact with no abnormalities noted   Throat:   No oral lesions, no thrush, oral mucosa moist   Neck:   No adenopathy, supple, trachea midline, no thyromegaly, no carotid bruit, no JVD   Back:     No kyphosis present, no scoliosis present, no skin lesions,    erythema or scars, no tenderness to percussion or                   palpation, range of motion normal   Lungs:     Clear to auscultation but course, respirations regular, even and unlabored    Heart:    Regular rhythm and normal rate, frequent ectopy, normal S1 and S2, no murmur, no gallop, no rub, no click   Abdomen:     " Normal bowel sounds, no masses, no organomegaly, soft     nontender, nondistended, no guarding, no rebound   tenderness   Extremities:   Moves all extremities well,  no cyanosis, no redness, trace edema L>R   Pulses:   Pulses palpable and equal bilaterally   Skin:   No bleeding, bruising or rash   Lymph nodes:   No palpable adenopathy   Neurologic:   Cranial nerves 2 - 12 grossly intact, sensation intact, DTR     present and equal bilaterally          Results Review:  I personally reviewed the patient's clinical results.  Results from last 7 days   Lab Units 06/02/22  1703   WBC 10*3/mm3 9.69   HEMOGLOBIN g/dL 12.9*   HEMATOCRIT % 41.3   PLATELETS 10*3/mm3 188     Results from last 7 days   Lab Units 06/02/22  1703   SODIUM mmol/L 140   POTASSIUM mmol/L 4.2   CHLORIDE mmol/L 102   CO2 mmol/L 27.0   BUN mg/dL 14   CREATININE mg/dL 1.15   CALCIUM mg/dL 9.2   BILIRUBIN mg/dL 1.2   ALK PHOS U/L 227*   ALT (SGPT) U/L 48*   AST (SGOT) U/L 48*   GLUCOSE mg/dL 277*               Results from last 7 days   Lab Units 06/02/22  1703   PROBNP pg/mL 5,664.0*             Radiology:  Imaging Results (Last 72 Hours)     Procedure Component Value Units Date/Time    CT Angiogram Chest [[id number]] Collected: 06/02/22 1856     Updated: 06/02/22 1904    Narrative:      DATE OF EXAM: 6/2/2022 6:33 PM     PROCEDURE: CT ANGIOGRAM CHEST-     INDICATIONS: SOA w/ hx PE and noncompliant with eliquis     COMPARISON: 03/28/2021     TECHNIQUE: Contiguous axial imaging was obtained from the thoracic inlet  through the upper abdomen following the intravenous administration of 80  mL of Isovue 370. Reconstructed coronal and sagittal images were also  obtained. Automated exposure control and iterative reconstruction  methods were used.     The radiation dose reduction device was turned on for each scan per the  ALARA (As Low as Reasonably Achievable) protocol.     FINDINGS:  Pulmonary arteries: Adequate opacified. Some distortion of  peripheral  pulmonary arteries due to motion. Within that limitation, no acute  emboli demonstrated. Mural calcification of the pulmonary trunk     Mediastinum: Aortic valve and coronary calcifications. No pericardial  effusion. No mediastinal adenopathy. Thoracic aorta normal in caliber     Lungs/pleura: Moderate bilateral pleural effusions with secondary  atelectasis in the lower lobes. Mild interlobular septal thickening and  peribronchovascular interstitial thickening. Scattered foci of  atelectasis in the remaining lungs     Upper abdomen: Nodular contour of the liver     Bones/soft tissues: No acute bony abnormality          Impression:         1. No evidence of acute pulmonary embolism. Mural calcination's of the  pulmonary trunk may indicate pulmonary arterial hypertension  2. Findings compatible with interstitial edema and moderate bilateral  pleural effusions  3. Cirrhotic morphology of the liver     This report was finalized on 6/2/2022 7:01 PM by Francisco Hummel.       XR Chest 1 View [[id number]] Collected: 06/02/22 1704     Updated: 06/02/22 1708    Narrative:      XR CHEST 1 VW-     Date of Exam: 6/2/2022 4:44 PM     Indication: SOA triage protocol.     Comparison:?12/6/2021     Technique:?A single view of the chest was obtained.     FINDINGS:     ?Patient status post median sternotomy.  Cardiomegaly is stable.   Pulmonary vessels are indistinct consistent with pulmonary vascular  congestion.  There is hazy perihilar and bibasilar airspace disease  favored to be due to pulmonary edema.  There are new small bilateral  pleural effusions.             Impression:            1.  Cardiomegaly with pulmonary vascular congestion and bibasilar and  perihilar airspace disease favored to be due to pulmonary edema.  2.  Small bilateral pleural effusions.        This report was finalized on 6/2/2022 5:05 PM by Long Ayala MD.               Tele:  NSR, PACs/PVCs        Assessment & Plan     1.  Acute on Chronic  Diastolic Congestive Heart Failure   -  85 yo CM with multiple chronic medical conditions including Resp Failure, VHDz, poorly controlled IDMMI presents with 1 month of progressive dyspnea with increased O2 demand.  Also reports recent pedal edema   -  ProBNP 5664 (up from 1604 a year ago).   -  Will Review repeat echo   -  Agree with IV diuresis, monitoring of labs/Cr/I&Os. Transition to oral prior to discharge.   -  Will DC ARB/diuretic and initiate Entresto.  DC IV Nitro.   -  Compliance with medications has been an issue according to patient and daughter.  He knows he needs to take them and just doesn't - he hasn't taken medications in about 10 days.  Plan to move to assisted living facility where they will manage medications.    2.  Acute on Chronic Respiratory Failure   -  85 yo CM who uses O2 PRN at home as well as CPAP at night.  Over last month has had progressive dyspnea requiring use of O2 24/7 with sats in 80s.   -  Provide O2 support.  CTA negative for PE (h/o  DVT/PE). LLE Venous duplex ordered   -  Per Primary service.      3.  VHDz/AS   -  S/P Ross procedure > 20 years ago   -  Echo 1/4/22 shows EF 52%, grade I diastolic dysfunction, mod calcification of aortic valve.  Repeat echo - will review    4.  IDMMII   -  Poorly controlled with reported discontinuation of insulin at home 2 weeks ago.   -  Per primary service    5.  H/O DVT/PE   -  Will resume Eliquis 5mg twice daily   -  CTA negative for PE.  LLE venous duplex pending.    6.  Cirrhosis   -  Labs reviewed.  CT reviewed.      I discussed the patient's findings and my recommendations with the patient, any present family members, and the nursing staff.  Ha Toussaint MD saw and examined patient, verified hx and PE, read all radiographic studies, reviewed labs and micro data, and formulated dx, plan for treatment and all medical decision making.      Ginny Cramer PA-C, working with Ha Toussaint MD  06/03/22 08:47 EDT

## 2022-06-03 NOTE — CASE MANAGEMENT/SOCIAL WORK
Discharge Planning Assessment  [facility]     Patient Name: Blane Dietz  MRN: [id number]  Today's Date: 6/3/2022    Admit Date: 6/2/2022     Discharge Needs Assessment     Row Name 06/03/22 0934       Living Environment    People in Home alone    Current Living Arrangements home    Primary Care Provided by self       Transition Planning    Transportation Anticipated family or friend will provide       Discharge Needs Assessment    Readmission Within the Last 30 Days no previous admission in last 30 days    Equipment Currently Used at Home walker, rolling;cane, straight;cpap;oxygen    Concerns to be Addressed discharge planning    Current Discharge Risk chronically ill;lives alone               Discharge Plan     Row Name 06/03/22 0935       Plan    Provided Post Acute Provider List? Yes    Post Acute Provider List Nursing Home    Delivered To Patient    Method of Delivery In person    Plan Comments I met with Mr. Dietz at the bedside. He lives in [facility] alone. He ambulates using a straight cane or rolling walker. He is independent with activities of daily living. He has continuous oxygen through [employer]. He uses a CPAP at night. He manages his medications independently. He is scheduled to move into assisted living at [facility] in July. Once there, they will facilitate medication administration and assist with activities of daily living as needed. I received a consult requesting that I discuss rehab placement at a skilled nursing facility with him. Mr. Dietz verbalizes understanding, but is not entirely certain he would be agreeable to this. He would like a referral made to [facility], the skilled rehab associated with [facility]. Once PT/OT evaluations are complete, I will make these referrals.    Final Discharge Disposition Code 30 - still a patient              Continued Care and Services - Admitted Since 6/2/2022    Coordination has not been started for this encounter.           Demographic Summary     Row Name 06/03/22 0934       General Information    General Information Comments I confirmed that Mr. Dietz does plan on establishing care with [facility]. His old PCP recently retired. [employer] medicare is his insurer.               Functional Status     Row Name 06/03/22 0934       Functional Status, IADL    Medications independent    Meal Preparation independent               Psychosocial    No documentation.                Abuse/Neglect    No documentation.                Legal    No documentation.                Substance Abuse    No documentation.                Patient Forms    No documentation.                   Richardson Gonzalez RN

## 2022-06-03 NOTE — THERAPY EVALUATION
Patient Name: Blane Dietz  : 1935    MRN: [id number]                              Today's Date: 6/3/2022       Admit Date: 2022    Visit Dx:     ICD-10-CM ICD-9-CM   1. Acute on chronic congestive heart failure, unspecified heart failure type (HCC)  I50.9 428.0   2. Hypoxemia requiring supplemental oxygen  R09.02 799.02    Z99.81    3. Diabetes mellitus type 2 in obese (HCC)  E11.69 250.00    E66.9 278.00   4. Elevated blood pressure reading with diagnosis of hypertension  I10 401.9   5. 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)   • PICC (peripherally inserted central catheter) flush   • Acute on chronic congestive heart failure, unspecified heart failure type (HCC)   • Elevated troponin   • Cirrhosis of liver (HCC)   • Elevated transaminase level   • Pleural effusion, bilateral   • Normocytic anemia   • History of pulmonary embolism     Past Medical History:   Diagnosis Date   • Aortic stenosis     status post ROSS procedure, remote, with 2015 echocardiography revealing normal aortic and pulmonary valve function, normal ejection fraction and mild to moderate MR   • Arthritis    • Bilateral impacted cerumen 2016   • Bronchitis    • Chronic gout of right foot 2016    Impression: 2016 - stable.;    • COVID-19 vaccine series completed 2021    [place] 2nd dose 3/12/21.   • Depression    • Diabetes mellitus  (HCC)    • Diverticulitis    • Exogenous obesity    • Gout    • Heart murmur    • History of vitamin D deficiency    • Hypercholesteremia 8/11/2016   • Hyperlipidemia    • Hypertension    • Kidney lesion    • Malignant neoplasm of prostate (HCC)    • Morbid obesity (HCC) 8/11/2016   • Pneumonia 05/12/2021   • Primary osteoarthritis involving multiple joints 6/13/2016    Impression: 03/08/2016 - stable;    • Pulmonary embolism (HCC)    • Sleep apnea 05/12/2021    C-Pap   • Uncontrolled type 2 diabetes mellitus with hyperglycemia (HCC) 6/13/2016    Impression: 03/08/2016 - seeing Endo .;    • Vertigo      Past Surgical History:   Procedure Laterality Date   • CARDIAC VALVE REPLACEMENT  05/12/2021    Ross procedure 22 years ago   • COLON SURGERY     • COLONOSCOPY     • COLOSTOMY  1999   • COLOSTOMY REVISION     • CYSTOSCOPY N/A 12/7/2021    Procedure: CYSTOSCOPY FLEXIBLE;  Surgeon: José Miguel Villafuerte Jr., MD;  Location: [facility] OR;  Service: Urology;  Laterality: N/A;   • EXPLORATORY LAPAROTOMY      With Tissue Removal   • LIPOMA EXCISION     • MOHS SURGERY     • NEPHROURETERECTOMY Right 12/7/2021    Procedure: RIGHT NEPHROURETERECTOMY LAPAROSCOPIC WITH DAVINCI ROBOT;  Surgeon: José Miguel Villafuerte Jr., MD;  Location:  [facility] OR;  Service: Robotics - DaVinci;  Laterality: Right;   • OTHER SURGICAL HISTORY      Porcine Valve   • PROSTATE SURGERY     • PROSTATECTOMY  2000     History of Prostatectomy Perineal Radical   • SKIN CANCER EXCISION      from head and lip   • TONSILLECTOMY        General Information     Row Name 06/03/22 1535          Physical Therapy Time and Intention    Document Type evaluation  -SS     Mode of Treatment physical therapy  -SS     Row Name 06/03/22 1535          General Information    Patient Profile Reviewed yes  -SS     Prior Level of Function independent:;all household mobility;gait;transfer;bed mobility;driving  RW, cane PRN, O2 at baseline  -SS     Existing Precautions/Restrictions  fall;oxygen therapy device and L/min  -     Barriers to Rehab medically complex  -     Row Name 06/03/22 1535          Living Environment    People in Home alone;other (see comments)  anticipates moving into [facility] in July 2022  -     Row Name 06/03/22 1535          Home Main Entrance    Number of Stairs, Main Entrance other (see comments)  ramp  -     Row Name 06/03/22 1535          Stairs Within Home, Primary    Number of Stairs, Within Home, Primary none  -     Row Name 06/03/22 1535          Cognition    Orientation Status (Cognition) oriented x 4  -     Row Name 06/03/22 1535          Safety Issues, Functional Mobility    Safety Issues Affecting Function (Mobility) awareness of need for assistance;insight into deficits/self-awareness;positioning of assistive device;problem-solving;safety precaution awareness;safety precautions follow-through/compliance;sequencing abilities  -     Impairments Affecting Function (Mobility) balance;endurance/activity tolerance;shortness of breath;strength;postural/trunk control  -           User Key  (r) = Recorded By, (t) = Taken By, (c) = Cosigned By    Initials Name Provider Type     Sophia Pina, PT Physical Therapist               Mobility     Row Name 06/03/22 1537          Bed Mobility    Comment, (Bed Mobility) up in chair  -     Row Name 06/03/22 1537          Sit-Stand Transfer    Sit-Stand [place] (Transfers) contact guard;verbal cues  -     Assistive Device (Sit-Stand Transfers) walker, front-wheeled  -     Comment, (Sit-Stand Transfer) VC for hand placement, appropriate alignment, lowering with eccentric control  -     Row Name 06/03/22 1537          Gait/Stairs (Locomotion)    [place] Level (Gait) contact guard;verbal cues  -     Assistive Device (Gait) walker, front-wheeled  -     Distance in Feet (Gait) 40  -SS     Deviations/Abnormal Patterns (Gait) bilateral deviations;binta decreased;gait speed decreased;stride length  decreased  -SS     Bilateral Gait Deviations forward flexed posture;heel strike decreased  -     Comment, (Gait/Stairs) Pt. ambulated with a step through gait pattern. VC for upright posture, safety recommendations. Gait limited by fatigue. O2 desat to 88% on 2L nasal cannula.  -           User Key  (r) = Recorded By, (t) = Taken By, (c) = Cosigned By    Initials Name Provider Type     Sophia Pina PT Physical Therapist               Obj/Interventions     Row Name 06/03/22 1543          Range of Motion Comprehensive    General Range of Motion bilateral lower extremity ROM WFL  -     Row Name 06/03/22 1543          Strength Comprehensive (MMT)    Comment, General Manual Muscle Testing (MMT) Assessment BLE gross 4/5  -     Row Name 06/03/22 1543          Motor Skills    Motor Skills functional endurance  -     Functional Endurance moderate impairment  -     Row Name 06/03/22 1543          Balance    Balance Assessment sitting static balance;sitting dynamic balance;sit to stand dynamic balance;standing static balance;standing dynamic balance  -     Static Sitting Balance independent  -     Dynamic Sitting Balance supervision  -     Position, Sitting Balance unsupported;sitting in chair  -     Sit to Stand Dynamic Balance contact guard  -     Static Standing Balance contact guard  -     Dynamic Standing Balance contact guard  -SS     Position/Device Used, Standing Balance supported;walker, front-wheeled  -     Balance Interventions sitting;standing;sit to stand;supported;static;dynamic  -     Row Name 06/03/22 1543          Sensory Assessment (Somatosensory)    Sensory Assessment (Somatosensory) LE sensation intact  -           User Key  (r) = Recorded By, (t) = Taken By, (c) = Cosigned By    Initials Name Provider Type     Sophia Pina PT Physical Therapist               Goals/Plan     Row Name 06/03/22 1546          Bed Mobility Goal 1 (PT)    Activity/Assistive Device (Bed  Mobility Goal 1, PT) bed mobility activities, all  -SS     [place] Level/Cues Needed (Bed Mobility Goal 1, PT) independent  -SS     Time Frame (Bed Mobility Goal 1, PT) long term goal (LTG);10 days  -SS     Row Name 06/03/22 1546          Transfer Goal 1 (PT)    Activity/Assistive Device (Transfer Goal 1, PT) sit-to-stand/stand-to-sit;bed-to-chair/chair-to-bed  -SS     [place] Level/Cues Needed (Transfer Goal 1, PT) independent  -SS     Time Frame (Transfer Goal 1, PT) long term goal (LTG);10 days  -SS     Row Name 06/03/22 1546          Gait Training Goal 1 (PT)    Activity/Assistive Device (Gait Training Goal 1, PT) gait (walking locomotion);assistive device use;walker, rolling  -SS     [place] Level (Gait Training Goal 1, PT) modified independence  -SS     Distance (Gait Training Goal 1, PT) 150  -SS     Time Frame (Gait Training Goal 1, PT) long term goal (LTG);10 days  -SS     Row Name 06/03/22 1546          Therapy Assessment/Plan (PT)    Planned Therapy Interventions (PT) balance training;bed mobility training;gait training;home exercise program;neuromuscular re-education;patient/family education;postural re-education;ROM (range of motion);strengthening;stretching;transfer training  -           User Key  (r) = Recorded By, (t) = Taken By, (c) = Cosigned By    Initials Name Provider Type    SS Sophia Pina, PT Physical Therapist               Clinical Impression     Row Name 06/03/22 1547          Pain    Pretreatment Pain Rating 0/10 - no pain  -SS     Posttreatment Pain Rating 0/10 - no pain  -SS     Pain Intervention(s) Repositioned;Ambulation/increased activity  -     Additional Documentation Pain Scale: Numbers Pre/Post-Treatment (Group)  -     Row Name 06/03/22 1545          Plan of Care Review    Plan of Care Reviewed With patient  -SS     Outcome Evaluation Pt. presents with generalized weakness, balance impairments and decreased activity tolerance affecting his ability to safely  participate in functional mobility. He performed sit to stand transfer w/contact guard assist. He ambulated 40' w/front wheeled walker, contact guard assist. Gait limited by fatigue. O2 desat to 88% on 2L nasal cannula. Recommend inpatient rehab upon discharge.  -     Row Name 06/03/22 1544          Therapy Assessment/Plan (PT)    Rehab Potential (PT) good, to achieve stated therapy goals  -     Criteria for Skilled Interventions Met (PT) yes;meets criteria;skilled treatment is necessary  -     Therapy Frequency (PT) daily  -     Row Name 06/03/22 1544          Vital Signs    Pre Systolic BP Rehab 128  -SS     Pre Treatment Diastolic BP 70  -SS     Pretreatment Heart Rate (beats/min) 81  -SS     Posttreatment Heart Rate (beats/min) 83  -SS     Pre SpO2 (%) 91  -SS     O2 Delivery Pre Treatment room air  -SS     Intra SpO2 (%) 88  -SS     O2 Delivery Intra Treatment nasal cannula  2L  -SS     Post SpO2 (%) 95  -SS     O2 Delivery Post Treatment nasal cannula  2L  -SS     Pre Patient Position Sitting  -     Intra Patient Position Standing  -     Post Patient Position Sitting  -[facility] Name 06/03/22 1544          Positioning and Restraints    Pre-Treatment Position sitting in chair/recliner  -SS     Post Treatment Position chair  -SS     In Chair notified nsg;reclined;call light within reach;encouraged to call for assist;exit alarm on;waffle cushion;legs elevated  -           User Key  (r) = Recorded By, (t) = Taken By, (c) = Cosigned By    Initials Name Provider Type     Sophia Pina, PT Physical Therapist               Outcome Measures     Row Name 06/03/22 1547 06/03/22 0815       How much help from another person do you currently need...    Turning from your back to your side while in flat bed without using bedrails? 3  -SS 4  -EO    Moving from lying on back to sitting on the side of a flat bed without bedrails? 3  -SS 4  -EO    Moving to and from a bed to a chair (including a wheelchair)? 3   -SS 3  -EO    Standing up from a chair using your arms (e.g., wheelchair, bedside chair)? 3  -SS 3  -EO    Climbing 3-5 steps with a railing? 3  -SS 3  -EO    To walk in hospital room? 3  -SS 3  -EO    AM-PAC 6 Clicks Score (PT) 18  -SS 20  -EO    Highest level of mobility 6 --> Walked 10 steps or more  -SS 6 --> Walked 10 steps or more  -EO    Row Name 06/03/22 1547          Functional Assessment    Outcome Measure Options AM-PAC 6 Clicks Basic Mobility (PT)  -           User Key  (r) = Recorded By, (t) = Taken By, (c) = Cosigned By    Initials Name Provider Type    EO Mackenzie Chavez RN Registered Nurse     Sophia Pina, PT Physical Therapist                             Physical Therapy Education                 Title: PT OT SLP Therapies (In Progress)     Topic: Physical Therapy (In Progress)     Point: Mobility training (Done)     Learning Progress Summary           Patient Eager, E, VU,NR by  at 6/3/2022 1547    Comment: Educated pt. safety/technique with transfers, ambulation, PT POC                   Point: Home exercise program (Not Started)     Learner Progress:  Not documented in this visit.          Point: Body mechanics (Done)     Learning Progress Summary           Patient Eager, E, VU,NR by  at 6/3/2022 1547    Comment: Educated pt. safety/technique with transfers, ambulation, PT POC                   Point: Precautions (Done)     Learning Progress Summary           Patient Eager, E, VU,NR by  at 6/3/2022 1547    Comment: Educated pt. safety/technique with transfers, ambulation, PT POC                               User Key     Initials Effective Dates Name Provider Type Discipline     06/01/21 -  Sophia Pina, AVINASH Physical Therapist PT              PT Recommendation and Plan  Planned Therapy Interventions (PT): balance training, bed mobility training, gait training, home exercise program, neuromuscular re-education, patient/family education, postural re-education, ROM (range of motion),  strengthening, stretching, transfer training  Plan of Care Reviewed With: patient  Outcome Evaluation: Pt. presents with generalized weakness, balance impairments and decreased activity tolerance affecting his ability to safely participate in functional mobility. He performed sit to stand transfer w/contact guard assist. He ambulated 40' w/front wheeled walker, contact guard assist. Gait limited by fatigue. O2 desat to 88% on 2L nasal cannula. Recommend inpatient rehab upon discharge.     Time Calculation:    PT Charges     Row Name 06/03/22 1548             Time Calculation    Start Time 1456  -SS      Stop Time 1506  -SS      Time Calculation (min) 10 min  -SS      PT Received On 06/03/22  -SS      PT Goal Re-Cert Due Date 06/13/22  -SS              Time Calculation- PT    Total Timed Code Minutes- PT 10 minute(s)  -SS              Untimed Charges    PT Eval/Re-eval Minutes 50  -SS              Total Minutes    Untimed Charges Total Minutes 50  -SS       Total Minutes 50  -SS            User Key  (r) = Recorded By, (t) = Taken By, (c) = Cosigned By    Initials Name Provider Type     Sophia Pina, AVINASH Physical Therapist              Therapy Charges for Today     Code Description Service Date Service Provider Modifiers Qty    42994864999 HC PT EVAL LOW COMPLEXITY 4 6/3/2022 Sophia Pina PT GP 1          PT G-Codes  Outcome Measure Options: AM-PAC 6 Clicks Basic Mobility (PT)  AM-PAC 6 Clicks Score (PT): 18    Sophia Pina PT  6/3/2022

## 2022-06-03 NOTE — PROGRESS NOTES
[facility] Medicine Services  ADMISSION FOLLOW-UP NOTE          Patient admitted after midnight, H&P by my partner performed earlier on today's date reviewed.  Interim findings, labs, and charting also reviewed.        [facility] Hospital Problem List has been managed and updated to include any new diagnoses:  Active Hospital Problems    Diagnosis  POA   • **Acute on chronic respiratory failure with hypoxia (HCC) [J96.21]  Unknown   • Elevated troponin [R77.8]  Unknown   • Cirrhosis of liver (HCC) [K74.60]  Unknown   • Elevated transaminase level [R74.01]  Unknown   • Pleural effusion, bilateral [J90]  Unknown   • Normocytic anemia [D64.9]  Unknown   • History of pulmonary embolism [Z86.711]  Unknown   • Acute on chronic congestive heart failure, unspecified heart failure type (HCC) [I50.9]  Yes   • 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    This patient's problems and plans were partially entered by my partner and  updated as appropriate by me 06/03/22.    Acute on chronic hypoxic respiratory failure  Acute exacerbation of diastolic CHF  Bilateral pleural effusions  Aortic stenosis s/p ROSS procedure  Elevated troponin  HUSSAIN on CPAP  --Likely due to medication non-adherence  --Continue IV diuresis  --Cardiology following  --Resume home jardiance     Elevated transaminase levels  Cirrhosis  Liver lesion  -Imaging concerning for cirrhosis with lesion in left lobe of liver  --Will need additional imaging with MRI or CT liver mass protocol as an outpatient  --Outpatient GI follow up-will place referral at discharge     History of pulmonary embolism  -Completed 1 year of Eliquis therapy.  Per Dr. Ramon's last note, patient was instructed to discontinue Eliquis in March.    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 Raomn outpatient     Diabetes mellitus type 2  -Has not been taking insulin for over 10 days.  -Contininue Levemir 20 units nightly with moderate SSI, resume jardiance  -Last A1c was 8.8%    Kirstin Estevez MD  06/03/22

## 2022-06-03 NOTE — H&P
[facility] Medicine Services  HISTORY AND PHYSICAL    Patient Name: Blane Dietz  : 1935  MRN: [id number]  Primary Care Physician: David Em MD  Date of admission: 2022      Subjective   Subjective     Chief Complaint:  Shortness of breath    HPI:  Blane Dietz is a 86 y.o. 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  Daughter at bedside assists with HPI.  She complains of shortness of breath which has been ongoing for the past 1 month.  Over the past several days his symptoms have became much more severe, breathing much more labored.  He has been wearing his home as needed oxygen continuous and has been wearing his CPAP during the day due to difficulty breathing. He reports increased weakness and fatigue, says he has been too weak to get out of bed to eat for the past several days.  He has had increased edema to his left lower extremity, mild cough.  He denies fever, malaise, orthopnea, dysuria, vomiting, abdominal pain.  He has chronic diarrhea which she says is at baseline.  Due to progressive decline, daughter encouraged him to come to the ED for further evaluation.    Daughter says patient has having a difficult time managing home medications and daughter is currently attempting to get him into assisted living.  But, he has not been taking home meds recently.  He says he has not taken insulin for at least 10 days.    Review of Systems   Constitutional: Positive for activity change, appetite change and fatigue. Negative  for chills and fever.   HENT: Negative.    Eyes: Negative.    Respiratory: Positive for cough and shortness of breath.    Cardiovascular: Positive for leg swelling. Negative for chest pain.   Gastrointestinal: Positive for diarrhea. Negative for abdominal pain, nausea and vomiting.   Endocrine: Negative.    Genitourinary: Negative.    Musculoskeletal: Negative.  Negative for myalgias.   Skin: Negative.    Allergic/Immunologic: Negative.    Neurological: Positive for weakness.   Hematological: Negative.    Psychiatric/Behavioral: Negative.         All other systems reviewed and are negative.     Personal History     Past Medical History:   Diagnosis Date   • Aortic stenosis     status post ROSS procedure, remote, with December 2015 echocardiography revealing normal aortic and pulmonary valve function, normal ejection fraction and mild to moderate MR   • Arthritis    • Bilateral impacted cerumen 11/23/2016   • Bronchitis    • Chronic gout of right foot 6/13/2016    Impression: 03/08/2016 - stable.;    • COVID-19 vaccine series completed 05/12/2021    [place] 2nd dose 3/12/21.   • Depression    • Diabetes mellitus (HCC)    • Diverticulitis    • Exogenous obesity    • Gout    • Heart murmur    • History of vitamin D deficiency    • Hypercholesteremia 8/11/2016   • Hyperlipidemia    • Hypertension    • Kidney lesion    • Malignant neoplasm of prostate (HCC)    • Morbid obesity (HCC) 8/11/2016   • Pneumonia 05/12/2021   • Primary osteoarthritis involving multiple joints 6/13/2016    Impression: 03/08/2016 - stable;    • Pulmonary embolism (HCC)    • Sleep apnea 05/12/2021    C-Pap   • Uncontrolled type 2 diabetes mellitus with hyperglycemia (HCC) 6/13/2016    Impression: 03/08/2016 - seeing Endo .;    • Vertigo          Oncology Problem List:  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)   (12/28/2021; Status: Active)  Prostate cancer (HCC) (08/11/2016; Status: Active)    Oncology/Hematology History    Prostate cancer (HCC)   8/11/2016 Initial Diagnosis    Prostate cancer (CMS/HCC)     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)   12/28/2021 Initial Diagnosis    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)     12/28/2021 Cancer Staged    Staging form: Hodgkin And Non-Hodgkin Lymphoma, AJCC 8th Edition  - Clinical stage from 12/28/2021: Stage IE (Diffuse large B-cell lymphoma) - Signed by Shannan Ramon MD on 12/28/2021 1/21/2022 -  Chemotherapy    OP CENTRAL VENOUS ACCESS DEVICE ACCESS, CARE, AND MAINTENANCE (CVAD)         Past Surgical History:   Procedure Laterality Date   • CARDIAC VALVE REPLACEMENT  05/12/2021    Ross procedure 22 years ago   • COLON SURGERY     • COLONOSCOPY     • COLOSTOMY  1999   • COLOSTOMY REVISION     • CYSTOSCOPY N/A 12/7/2021    Procedure: CYSTOSCOPY FLEXIBLE;  Surgeon: José Miguel Villafuerte Jr., MD;  Location: [facility] OR;  Service: Urology;  Laterality: N/A;   • EXPLORATORY LAPAROTOMY      With Tissue Removal   • LIPOMA EXCISION     • MOHS SURGERY     • NEPHROURETERECTOMY Right 12/7/2021    Procedure: RIGHT NEPHROURETERECTOMY LAPAROSCOPIC WITH DAVINCI ROBOT;  Surgeon: José Miguel Villafuerte Jr., MD;  Location: [facility] OR;  Service: Robotics - DaVinci;  Laterality: Right;   • OTHER SURGICAL HISTORY      Porcine Valve   • PROSTATE SURGERY     • PROSTATECTOMY  2000     History of Prostatectomy Perineal Radical   • SKIN CANCER EXCISION      from head and lip   • TONSILLECTOMY         Family History:  family history includes Breast cancer in an other family member; Cancer in his mother and another family member; Diabetes in his mother and another family member; Heart disease in his father; Hypertension in an other family member; Lung cancer in his mother; Migraines in an other family member; Obesity in an other family member. Otherwise pertinent FHx was reviewed and unremarkable.     Social History:  reports that he has never smoked. He  has never used smokeless tobacco. He reports that he does not drink alcohol and does not use drugs.  Social History     Social History Narrative    Right hand dominant       Medications:  Available home medication information reviewed.  (Not in a hospital admission)      Allergies   Allergen Reactions   • Ampicillin Anaphylaxis   • Medrol [Methylprednisolone] Unknown - High Severity     Made his blood sugar get really high, can't take this   • Myrbetriq [Mirabegron] Unknown - High Severity     High BP   • Penicillins Anaphylaxis   • Bee Venom Swelling       Objective   Objective     Vital Signs:   Temp:  [97.5 °F (36.4 °C)] 97.5 °F (36.4 °C)  Heart Rate:  [] 81  Resp:  [16] 16  BP: (144-177)/() 144/86  Flow (L/min):  [2-4] 4       Physical Exam   Constitutional: Awake, alert  Eyes: PERRLA, sclerae anicteric, no conjunctival injection  HENT: NCAT, mucous membranes moist  Neck: Supple, no thyromegaly, no lymphadenopathy, trachea midline  Respiratory: Poor air movement on the right, inspiratory crackles at left lung base, speaking in short phrases  Cardiovascular: RRR, no murmurs, rubs, or gallops, palpable pedal pulses bilaterally  Gastrointestinal: Positive bowel sounds, soft, nontender, nondistended  Musculoskeletal: No bilateral ankle edema, no clubbing or cyanosis to extremities  Psychiatric: Appropriate affect, cooperative  Neurologic: Oriented x 3, strength symmetric in all extremities, Cranial Nerves grossly intact to confrontation, speech clear  Skin: No rashes      Result Review:  I have personally reviewed the results from the time of this admission to 6/3/2022 01:06 EDT and agree with these findings:  [x]  Laboratory  [x]  Microbiology  [x]  Radiology  [x]  EKG/Telemetry   []  Cardiology/Vascular   []  Pathology  [x]  Old records  []  Other:      LAB RESULTS:      Lab 06/02/22  1703   WBC 9.69   HEMOGLOBIN 12.9*   HEMATOCRIT 41.3   PLATELETS 188   NEUTROS ABS 7.05*   IMMATURE GRANS (ABS) 0.22*    LYMPHS ABS 1.30   MONOS ABS 0.70   EOS ABS 0.21   MCV 87.3   CRP <0.30   PROCALCITONIN 0.11   LACTATE 1.6   *         Lab 06/02/22  1703   SODIUM 140   POTASSIUM 4.2   CHLORIDE 102   CO2 27.0   ANION GAP 11.0   BUN 14   CREATININE 1.15   EGFR 62.8   GLUCOSE 277*   CALCIUM 9.2         Lab 06/02/22  1703   TOTAL PROTEIN 7.3   ALBUMIN 4.20   GLOBULIN 3.1   ALT (SGPT) 48*   AST (SGOT) 48*   BILIRUBIN 1.2   ALK PHOS 227*         Lab 06/02/22  2131 06/02/22  1703   PROBNP  --  5,664.0*   TROPONIN T 0.047* 0.041*             Lab 06/02/22  1703   FERRITIN 167.30         Lab 06/02/22  2351   PH, ARTERIAL 7.359   PCO2, ARTERIAL 46.0*   PO2 ART 87.7   FIO2 36   HCO3 ART 25.9   BASE EXCESS ART 0.0   CARBOXYHEMOGLOBIN 0.9     UA    Urinalysis 12/6/21   Specific Gravity, UA 1.007   Ketones, UA Negative   Blood, UA Negative   Leukocytes, UA Negative   Nitrite, UA Negative             Microbiology Results (last 10 days)     Procedure Component Value - Date/Time    COVID PRE-OP / PRE-PROCEDURE SCREENING ORDER (NO ISOLATION) - Swab, Nasopharynx [[id number]]  (Normal) Collected: 06/02/22 1650    Lab Status: Final result Specimen: Swab from Nasopharynx Updated: 06/02/22 1732    Narrative:      The following orders were created for panel order COVID PRE-OP / PRE-PROCEDURE SCREENING ORDER (NO ISOLATION) - Swab, Nasopharynx.  Procedure                               Abnormality         Status                     ---------                               -----------         ------                     COVID-19 and FLU A/B PCR...[[id number]]  Normal              Final result                 Please view results for these tests on the individual orders.    COVID-19 and FLU A/B PCR - Swab, Nasopharynx [[id number]]  (Normal) Collected: 06/02/22 1650    Lab Status: Final result Specimen: Swab from Nasopharynx Updated: 06/02/22 1732     COVID19 Not Detected     Influenza A PCR Not Detected     Influenza B PCR Not Detected    Narrative:       Fact sheet for providers: https://www.fda.gov/media/808657/download    Fact sheet for patients: https://www.fda.gov/media/265553/download    Test performed by PCR.          XR Chest 1 View    Result Date: 6/2/2022  XR CHEST 1 VW-  Date of Exam: 6/2/2022 4:44 PM  Indication: SOA triage protocol.  Comparison:?12/6/2021  Technique:?A single view of the chest was obtained.  FINDINGS:  ?Patient status post median sternotomy.  Cardiomegaly is stable. Pulmonary vessels are indistinct consistent with pulmonary vascular congestion.  There is hazy perihilar and bibasilar airspace disease favored to be due to pulmonary edema.  There are new small bilateral pleural effusions.        Impression:   1.  Cardiomegaly with pulmonary vascular congestion and bibasilar and perihilar airspace disease favored to be due to pulmonary edema. 2.  Small bilateral pleural effusions.   This report was finalized on 6/2/2022 5:05 PM by Long Ayala MD.      CT Angiogram Chest    Result Date: 6/2/2022  DATE OF EXAM: 6/2/2022 6:33 PM  PROCEDURE: CT ANGIOGRAM CHEST-  INDICATIONS: SOA w/ hx PE and noncompliant with eliquis  COMPARISON: 03/28/2021  TECHNIQUE: Contiguous axial imaging was obtained from the thoracic inlet through the upper abdomen following the intravenous administration of 80 mL of Isovue 370. Reconstructed coronal and sagittal images were also obtained. Automated exposure control and iterative reconstruction methods were used.  The radiation dose reduction device was turned on for each scan per the ALARA (As Low as Reasonably Achievable) protocol.  FINDINGS: Pulmonary arteries: Adequate opacified. Some distortion of peripheral pulmonary arteries due to motion. Within that limitation, no acute emboli demonstrated. Mural calcification of the pulmonary trunk  Mediastinum: Aortic valve and coronary calcifications. No pericardial effusion. No mediastinal adenopathy. Thoracic aorta normal in caliber  Lungs/pleura: Moderate bilateral  pleural effusions with secondary atelectasis in the lower lobes. Mild interlobular septal thickening and peribronchovascular interstitial thickening. Scattered foci of atelectasis in the remaining lungs  Upper abdomen: Nodular contour of the liver  Bones/soft tissues: No acute bony abnormality       Impression:  1. No evidence of acute pulmonary embolism. Mural calcination's of the pulmonary trunk may indicate pulmonary arterial hypertension 2. Findings compatible with interstitial edema and moderate bilateral pleural effusions 3. Cirrhotic morphology of the liver  This report was finalized on 6/2/2022 7:01 PM by Francisco Hummel.        Results for orders placed during the hospital encounter of 01/04/22    Adult Transthoracic Echo Complete W/ Cont if Necessary Per Protocol    Interpretation Summary  · The left ventricular cavity is borderline dilated.  · Left ventricular wall thickness is consistent with mild concentric hypertrophy.  · Estimated left ventricular EF = 52% Left ventricular ejection fraction appears to be 51 - 55%. Left ventricular systolic function is normal.  · Left ventricular diastolic function is consistent with (grade I) impaired relaxation.  · Left atrial volume is moderately increased.  · There is moderate calcification of the aortic valve mainly affecting the non-coronary, left coronary and right coronary cusp(s).  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      Assessment & Plan   Assessment & Plan     Active Hospital Problems    Diagnosis  POA   • **Acute on chronic respiratory failure with hypoxia (HCC) [J96.21]  Unknown   • Elevated troponin [R77.8]  Unknown   • Cirrhosis of liver (HCC) [K74.60]  Unknown   • Elevated transaminase level [R74.01]  Unknown   • Pleural effusion, bilateral [J90]  Unknown   • Normocytic anemia [D64.9]  Unknown   • History of pulmonary embolism [Z86.711]  Unknown   • Acute on chronic congestive heart failure, unspecified heart failure type (HCC) [I50.9]   Yes   • 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) [C83.39]  Yes   • s/p right nepheroureterectomy and adrenalectomy  [E89.6]  Not Applicable   • Elevated LFTs [R79.89]  Yes   • HLD (hyperlipidemia) [E78.5]  Yes   • S/P AVR [Z95.2]  Not Applicable   • Sleep apnea [G47.30]  Yes   • Aortic stenosis [I35.0]  Yes     status post ROSS procedure, remote, with December 2015 echocardiography revealing normal aortic and pulmonary valve function, normal ejection fraction and mild to moderate MR     • Prostate cancer (HCC) [C61]  Yes              • Essential hypertension [I10]  Yes     Impression: 03/08/2016 - Reviewed outside labs from Jan. Cr 1.3. K 4.2 and sodium 144. Normal cbc.;      • Uncontrolled type 2 diabetes mellitus with hyperglycemia (HCC) [E11.65]  Yes     Impression: 03/08/2016 - seeing Endo .;      Blane Dietz is a 86 y.o. 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    Acute on chronic hypoxic respiratory failure  Acute exacerbation of CHF  Bilateral pleural effusions  Aortic stenosis s/p ROSS procedure  Elevated troponin  HUSSAIN on CPAP  - Patient satting 88% on 2 L, maintaining sats in the mid 90s on 4 L currently  - Given Bumex 0.5 mg IV in ED, will give additional Bumex 1 mg IV twice daily x2 doses  - Defer further IV diuresis to day team  - Nitro drip  -BiPAP  - Strict intake and output, daily weights  - TTE for a.m.  - CTA negative for PE, consistent with volume overload  - May need thoracentesis  - Follows with Dr. Oconnor with cardiology, consult for  a.m.  - Elevated troponin, likely demand ischemia.  Trend  - FLP, hemoglobin A1c for a.m.  - Patient has not been taking home meds recently  - PT/OT  - Case management consult for possible rehab at discharge    Elevated transaminase levels  Cirrhosis  - CT concerning for cirrhosis of the liver, no known history of cirrhosis  - Acute hepatitis panel  - Possibly secondary to CHF  - Liver ultrasound for a.m.    History of pulmonary embolism  - Completed 1 year of Eliquis therapy.  Per Dr. Ramon's last note, patient was instructed to discontinue Eliquis in March.  Patient denies knowledge of this.  However he has not been taking Eliquis recently.  - No PE on CTA  -L>R LE edema, left venous doppler for a.m.    Diffuse large B-cell lymphoma of the right kidney  Prostate cancer s/p prostatectomy  - S/p right nephrectomy, R-CHOP 3 cycles  -Follows with Dr. Ramon outpatient, consider consult    Diabetes mellitus type 2  - Has not been taking insulin for over 10 days.  -Start conservatively with Levemir 20 units nightly with moderate SSI with Accu-Cheks  - Hemoglobin A1c for a.m.    Normocytic anemia  - Stable from prior studies  - A.m. labs    DVT prophylaxis: Lovenox      CODE STATUS: Full code, desires initial resuscitation but would not want prolonged life support  Code Status and Medical Interventions:   Ordered at: 06/03/22 0004     Level Of Support Discussed With:    Patient     Code Status (Patient has no pulse and is not breathing):    CPR (Attempt to Resuscitate)     Medical Interventions (Patient has pulse or is breathing):    Full Support         Courtney Ozuna, DO  06/03/22

## 2022-06-03 NOTE — THERAPY EVALUATION
Patient Name: Blane Dietz  : 1935    MRN: [id number]                              Today's Date: 6/3/2022       Admit Date: 2022    Visit Dx:     ICD-10-CM ICD-9-CM   1. Acute on chronic congestive heart failure, unspecified heart failure type (HCC)  I50.9 428.0   2. Hypoxemia requiring supplemental oxygen  R09.02 799.02    Z99.81    3. Diabetes mellitus type 2 in obese (HCC)  E11.69 250.00    E66.9 278.00   4. Elevated blood pressure reading with diagnosis of hypertension  I10 401.9   5. 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)   • PICC (peripherally inserted central catheter) flush   • Acute on chronic congestive heart failure, unspecified heart failure type (HCC)   • Elevated troponin   • Cirrhosis of liver (HCC)   • Elevated transaminase level   • Pleural effusion, bilateral   • Normocytic anemia   • History of pulmonary embolism     Past Medical History:   Diagnosis Date   • Aortic stenosis     status post ROSS procedure, remote, with 2015 echocardiography revealing normal aortic and pulmonary valve function, normal ejection fraction and mild to moderate MR   • Arthritis    • Bilateral impacted cerumen 2016   • Bronchitis    • Chronic gout of right foot 2016    Impression: 2016 - stable.;    • COVID-19 vaccine series completed 2021    [place] 2nd dose 3/12/21.   • Depression    • Diabetes mellitus  "(HCC)    • Diverticulitis    • Exogenous obesity    • Gout    • Heart murmur    • History of vitamin D deficiency    • Hypercholesteremia 8/11/2016   • Hyperlipidemia    • Hypertension    • Kidney lesion    • Malignant neoplasm of prostate (HCC)    • Morbid obesity (HCC) 8/11/2016   • Pneumonia 05/12/2021   • Primary osteoarthritis involving multiple joints 6/13/2016    Impression: 03/08/2016 - stable;    • Pulmonary embolism (HCC)    • Sleep apnea 05/12/2021    C-Pap   • Uncontrolled type 2 diabetes mellitus with hyperglycemia (HCC) 6/13/2016    Impression: 03/08/2016 - seeing Endo .;    • Vertigo      Past Surgical History:   Procedure Laterality Date   • CARDIAC VALVE REPLACEMENT  05/12/2021    Ross procedure 22 years ago   • COLON SURGERY     • COLONOSCOPY     • COLOSTOMY  1999   • COLOSTOMY REVISION     • CYSTOSCOPY N/A 12/7/2021    Procedure: CYSTOSCOPY FLEXIBLE;  Surgeon: José Miguel Villafuerte Jr., MD;  Location:  [facility];  Service: Urology;  Laterality: N/A;   • EXPLORATORY LAPAROTOMY      With Tissue Removal   • LIPOMA EXCISION     • MOHS SURGERY     • NEPHROURETERECTOMY Right 12/7/2021    Procedure: RIGHT NEPHROURETERECTOMY LAPAROSCOPIC WITH DAVINCI ROBOT;  Surgeon: José Miguel Villafuerte Jr., MD;  Location:  [facility];  Service: Robotics - DaVinci;  Laterality: Right;   • OTHER SURGICAL HISTORY      Porcine Valve   • PROSTATE SURGERY     • PROSTATECTOMY  2000     History of Prostatectomy Perineal Radical   • SKIN CANCER EXCISION      from head and lip   • TONSILLECTOMY        General Information     Row Name 06/03/22 1521          OT Time and Intention    Document Type evaluation  -CS     Mode of Treatment occupational therapy  -CS     Row Name 06/03/22 1521          General Information    Patient Profile Reviewed yes  -CS     Prior Level of Function independent:;all household mobility;ADL's;driving  Pt reports driving \"reluctantly\", SPC/RW use for in-home mobility prn. Describes home environment as very " messy/cluttered making it difficult to navigate. Supplemental O2 use at baseline (2Lnc and biPap for sleep)  -CS     Existing Precautions/Restrictions fall;oxygen therapy device and L/min  -CS     Barriers to Rehab medically complex  -     Row Name 06/03/22 1521          Living Environment    People in Home alone  -     Row Name 06/03/22 1521          Home Main Entrance    Number of Stairs, Main Entrance none;other (see comments)  ramp to enter  -     Row Name 06/03/22 1521          Stairs Within Home, Primary    Number of Stairs, Within Home, Primary none  -     Row Name 06/03/22 1521          Cognition    Orientation Status (Cognition) oriented x 4  -CS     Row Name 06/03/22 1521          Safety Issues, Functional Mobility    Safety Issues Affecting Function (Mobility) insight into deficits/self-awareness;safety precaution awareness;safety precautions follow-through/compliance  -     Impairments Affecting Function (Mobility) balance;endurance/activity tolerance;shortness of breath  -           User Key  (r) = Recorded By, (t) = Taken By, (c) = Cosigned By    Initials Name Provider Type     Matthew Sloan, OT Occupational Therapist                 Mobility/ADL's     Row Name 06/03/22 1523          Bed Mobility    Comment, (Bed Mobility) sitting EOB eating lunch upon OT arrival, nursing reports no difficulty reaching EOB  -[facility] Name 06/03/22 1523          Transfers    Transfers bed-chair transfer  -     Bed-Chair [place] (Transfers) contact guard;verbal cues  -     Assistive Device (Bed-Chair Transfers) walker, front-wheeled  -     Row Name 06/03/22 1523          Bed-Chair Transfer    Comment, (Bed-Chair Transfer) cues for AD positioning and PLB during in-room mobility, O2 sats range from 88-91% on 2Lnc  -     Row Name 06/03/22 1523          Functional Mobility    Functional Mobility- Comment defer to PT for specifics and recommendations  -     Row Name 06/03/22 1523           Activities of Daily Living    BADL Assessment/Intervention lower body dressing;feeding  -CS     Row Name 06/03/22 1523          Lower Body Dressing Assessment/Training    [place] Level (Lower Body Dressing) don;socks;contact guard assist  -CS     Position (Lower Body Dressing) edge of bed sitting  -CS     Comment, (Lower Body Dressing) CGA for dynamic sitting balance  -CS     Row Name 06/03/22 1523          Self-Feeding Assessment/Training    [place] Level (Feeding) feeding skills;liquids to mouth;prepare tray/open items;scoop food and bring to mouth;independent  -CS     Position (Self-Feeding) edge of bed sitting  -CS           User Key  (r) = Recorded By, (t) = Taken By, (c) = Cosigned By    Initials Name Provider Type     Matthew Sloan, OT Occupational Therapist               Obj/Interventions     Row Name 06/03/22 1526          Sensory Assessment (Somatosensory)    Sensory Assessment (Somatosensory) UE sensation intact  -[facility] Name 06/03/22 1526          Vision Assessment/Intervention    Visual Impairment/Limitations WFL;corrective lenses full-time  -[facility] Name 06/03/22 1526          Range of Motion Comprehensive    General Range of Motion no range of motion deficits identified  -[facility] Name 06/03/22 1526          Strength Comprehensive (MMT)    General Manual Muscle Testing (MMT) Assessment upper extremity strength deficits identified  -CS     Comment, General Manual Muscle Testing (MMT) Assessment BUE grossly 4+/5, appropriate for age and WFL for safe transfers and ADLs  -CS     Row Name 06/03/22 1526          Motor Skills    Motor Skills coordination;functional endurance  -CS     Coordination bilateral;upper extremity;bimanual skills;WFL  -CS     Functional Endurance RA upon therapist entry eating lunch (89-91%), standing and short in-room distance on 2Lnc (88-91%), cues for PLB  -CS     Row Name 06/03/22 1526          Balance    Balance Assessment sitting static balance;sitting  dynamic balance;standing static balance;standing dynamic balance  -CS     Static Sitting Balance independent  -CS     Dynamic Sitting Balance supervision  -CS     Position, Sitting Balance unsupported;sitting edge of bed  -CS     Static Standing Balance standby assist  -CS     Dynamic Standing Balance contact guard  -CS     Position/Device Used, Standing Balance supported;walker, front-wheeled  -CS     Balance Interventions sitting;sit to stand;standing;occupation based/functional task  -CS           User Key  (r) = Recorded By, (t) = Taken By, (c) = Cosigned By    Initials Name Provider Type    CS Matthew Sloan, OT Occupational Therapist               Goals/Plan     Row Name 06/03/22 1536          Transfer Goal 1 (OT)    Activity/Assistive Device (Transfer Goal 1, OT) bed-to-chair/chair-to-bed;toilet;walker, rolling  -CS     [place] Level/Cues Needed (Transfer Goal 1, OT) supervision required;modified independence  -CS     Time Frame (Transfer Goal 1, OT) long term goal (LTG);10 days  -CS     Strategies/Barriers (Transfers Goal 1, OT) stable O2 sats, safety w/ AD  -CS     Progress/Outcome (Transfer Goal 1, OT) goal ongoing  -CS     Row Name 06/03/22 1536          Dressing Goal 1 (OT)    Activity/Device (Dressing Goal 1, OT) lower body dressing  -CS     [place]/Cues Needed (Dressing Goal 1, OT) standby assist  -CS     Time Frame (Dressing Goal 1, OT) long term goal (LTG);10 days  -CS     Progress/Outcome (Dressing Goal 1, OT) goal ongoing  -CS     Row Name 06/03/22 1536          Grooming Goal 1 (OT)    Activity/Device (Grooming Goal 1, OT) hair care;wash face, hands;oral care  -CS     [place] (Grooming Goal 1, OT) supervision required;modified independence  -CS     Time Frame (Grooming Goal 1, OT) long term goal (LTG);10 days  -CS     Strategies/Barriers (Grooming Goal 1, OT) AD positioning at sink, endurance for 4 minute sinkside task(s)  -CS     Progress/Outcome (Grooming Goal 1, OT) goal ongoing   -CS     Row Name 06/03/22 1536          Problem Specific Goal 1 (OT)    Problem Specific Goal 1 (OT) Pt will demo 1/10reps pulmonary TE independenly by discharge to promote increased fxl endurance for safe ADL completion  -CS     Time Frame (Problem Specific Goal 1, OT) long term goal (LTG);10 days  -CS     Progress/Outcome (Problem Specific Goal 1, OT) goal ongoing  -CS     Row Name 06/03/22 1536          Therapy Assessment/Plan (OT)    Planned Therapy Interventions (OT) activity tolerance training;functional balance retraining;strengthening exercise;transfer/mobility retraining;patient/caregiver education/training;adaptive equipment training  -CS           User Key  (r) = Recorded By, (t) = Taken By, (c) = Cosigned By    Initials Name Provider Type    CS Matthew Sloan, OT Occupational Therapist               Clinical Impression     Row Name 06/03/22 1530          Pain Assessment    Pretreatment Pain Rating 0/10 - no pain  -CS     Posttreatment Pain Rating 0/10 - no pain  -CS     Pre/Posttreatment Pain Comment tolerated eval  -CS     Pain Intervention(s) Repositioned;Ambulation/increased activity  -CS     Row Name 06/03/22 1530          Plan of Care Review    Plan of Care Reviewed With patient  -CS     Progress no change  OT eval  -CS     Outcome Evaluation OT eval completed. Pt presents w/ decreased functional endurance and mild strength/balance deficit warranting skilled IP OT services to promote return to PLOF. CGA for transfers and short in-room distance, Sid for LB dressing, Ind w/ feeding. O2 sats ranged from 88-91% on 2Lnc, cues for PLB to improve sats. Pt with plans to move to [facility] in one week, OT recommends short IP rehab to assist with transition and promote best fxl outcomes.  -CS     Row Name 06/03/22 1530          Therapy Assessment/Plan (OT)    Patient/Family Therapy Goal Statement (OT) increased independence, safety, and endurance for ADL completion  -CS     Rehab Potential (OT) good, to achieve  stated therapy goals  -CS     Criteria for Skilled Therapeutic Interventions Met (OT) meets criteria;yes;skilled treatment is necessary  -CS     Therapy Frequency (OT) daily  -CS     Row Name 06/03/22 1530          Therapy Plan Review/Discharge Plan (OT)    Anticipated Discharge Disposition (OT) inpatient rehabilitation facility;other (see comments)  with transition to [facility].  -CS     Row Name 06/03/22 1530          Vital Signs    Pre Systolic BP Rehab --  RN cleared for eval, VSS on 2Lnc  -CS     Pre SpO2 (%) 89  -CS     O2 Delivery Pre Treatment room air  -CS     Intra SpO2 (%) 91  -CS     O2 Delivery Intra Treatment nasal cannula  -CS     Post SpO2 (%) 93  -CS     O2 Delivery Post Treatment nasal cannula  -CS     Pre Patient Position Sitting  -CS     Intra Patient Position Standing  -CS     Post Patient Position Sitting  -CS     Row Name 06/03/22 1530          Positioning and Restraints    Pre-Treatment Position in bed  -CS     Post Treatment Position chair  -CS     In Chair notified nsg;reclined;sitting;call light within reach;encouraged to call for assist;exit alarm on;waffle cushion;legs elevated;heels elevated  -CS           User Key  (r) = Recorded By, (t) = Taken By, (c) = Cosigned By    Initials Name Provider Type    CS Matthew Sloan, OT Occupational Therapist               Outcome Measures     Row Name 06/03/22 0815          How much help from another person do you currently need...    Turning from your back to your side while in flat bed without using bedrails? 4  -EO     Moving from lying on back to sitting on the side of a flat bed without bedrails? 4  -EO     Moving to and from a bed to a chair (including a wheelchair)? 3  -EO     Standing up from a chair using your arms (e.g., wheelchair, bedside chair)? 3  -EO     Climbing 3-5 steps with a railing? 3  -EO     To walk in hospital room? 3  -EO     AM-PAC 6 Clicks Score (PT) 20  -EO     Highest level of mobility 6 --> Walked 10 steps or more  -EO            User Key  (r) = Recorded By, (t) = Taken By, (c) = Cosigned By    Initials Name Provider Type    Mackenzie York, RN Registered Nurse                Occupational Therapy Education                 Title: PT OT SLP Therapies (In Progress)     Topic: Occupational Therapy (In Progress)     Point: ADL training (Done)     Description:   Instruct learner(s) on proper safety adaptation and remediation techniques during self care or transfers.   Instruct in proper use of assistive devices.              Learning Progress Summary           Patient Acceptance, E,D, VU,DU,NR by  at 6/3/2022 1538    Comment: PLB, safety awareness w/ AD                   Point: Home exercise program (Not Started)     Description:   Instruct learner(s) on appropriate technique for monitoring, assisting and/or progressing therapeutic exercises/activities.              Learner Progress:  Not documented in this visit.          Point: Precautions (Done)     Description:   Instruct learner(s) on prescribed precautions during self-care and functional transfers.              Learning Progress Summary           Patient Acceptance, E,D, VU,DU,NR by  at 6/3/2022 1538    Comment: PLB, safety awareness w/ AD                   Point: Body mechanics (Done)     Description:   Instruct learner(s) on proper positioning and spine alignment during self-care, functional mobility activities and/or exercises.              Learning Progress Summary           Patient Acceptance, E,D, VU,DU,NR by  at 6/3/2022 1538    Comment: PLB, safety awareness w/ AD                               User Key     Initials Effective Dates Name Provider Type Discipline     06/16/21 -  Matthew Sloan OT Occupational Therapist OT              OT Recommendation and Plan  Planned Therapy Interventions (OT): activity tolerance training, functional balance retraining, strengthening exercise, transfer/mobility retraining, patient/caregiver education/training, adaptive equipment  training  Therapy Frequency (OT): daily  Plan of Care Review  Plan of Care Reviewed With: patient  Progress: no change (OT eval)  Outcome Evaluation: OT eval completed. Pt presents w/ decreased functional endurance and mild strength/balance deficit warranting skilled IP OT services to promote return to PLOF. CGA for transfers and short in-room distance, Sid for LB dressing, Ind w/ feeding. O2 sats ranged from 88-91% on 2Lnc, cues for PLB to improve sats. Pt with plans to move to [facility] in one week, OT recommends short IP rehab to assist with transition and promote best fxl outcomes.     Time Calculation:    Time Calculation- OT     Row Name 06/03/22 1539             Time Calculation- OT    OT Start Time 1434  -CS      OT Received On 06/03/22  -CS      OT Goal Re-Cert Due Date 06/13/22  -CS              Untimed Charges    OT Eval/Re-eval Minutes 50  -CS              Total Minutes    Untimed Charges Total Minutes 50  -CS       Total Minutes 50  -CS            User Key  (r) = Recorded By, (t) = Taken By, (c) = Cosigned By    Initials Name Provider Type    CS Matthew Sloan, OT Occupational Therapist              Therapy Charges for Today     Code Description Service Date Service Provider Modifiers Qty    [id number] HC OT EVAL LOW COMPLEXITY 4 6/3/2022 Matthew Sloan OT GO 1               Matthew Sloan OT  6/3/2022

## 2022-06-04 LAB
ANION GAP SERPL CALCULATED.3IONS-SCNC: 12 MMOL/L (ref 5–15)
BUN SERPL-MCNC: 28 MG/DL (ref 8–23)
BUN/CREAT SERPL: 20.6 (ref 7–25)
CALCIUM SPEC-SCNC: 9.1 MG/DL (ref 8.6–10.5)
CHLORIDE SERPL-SCNC: 99 MMOL/L (ref 98–107)
CO2 SERPL-SCNC: 31 MMOL/L (ref 22–29)
CREAT SERPL-MCNC: 1.36 MG/DL (ref 0.76–1.27)
EGFRCR SERPLBLD CKD-EPI 2021: 51.3 ML/MIN/1.73
GLUCOSE BLDC GLUCOMTR-MCNC: 145 MG/DL (ref 70–130)
GLUCOSE BLDC GLUCOMTR-MCNC: 168 MG/DL (ref 70–130)
GLUCOSE BLDC GLUCOMTR-MCNC: 223 MG/DL (ref 70–130)
GLUCOSE BLDC GLUCOMTR-MCNC: 233 MG/DL (ref 70–130)
GLUCOSE SERPL-MCNC: 138 MG/DL (ref 65–99)
POTASSIUM SERPL-SCNC: 4.1 MMOL/L (ref 3.5–5.2)
SODIUM SERPL-SCNC: 142 MMOL/L (ref 136–145)

## 2022-06-04 PROCEDURE — 94799 UNLISTED PULMONARY SVC/PX: CPT

## 2022-06-04 PROCEDURE — 82962 GLUCOSE BLOOD TEST: CPT

## 2022-06-04 PROCEDURE — 80048 BASIC METABOLIC PNL TOTAL CA: CPT | Performed by: INTERNAL MEDICINE

## 2022-06-04 PROCEDURE — G0378 HOSPITAL OBSERVATION PER HR: HCPCS

## 2022-06-04 PROCEDURE — 94660 CPAP INITIATION&MGMT: CPT

## 2022-06-04 PROCEDURE — 63710000001 INSULIN LISPRO (HUMAN) PER 5 UNITS: Performed by: INTERNAL MEDICINE

## 2022-06-04 PROCEDURE — 63710000001 INSULIN DETEMIR PER 5 UNITS: Performed by: INTERNAL MEDICINE

## 2022-06-04 PROCEDURE — 99225 PR SBSQ OBSERVATION CARE/DAY 25 MINUTES: CPT | Performed by: INTERNAL MEDICINE

## 2022-06-04 RX ORDER — POLYETHYLENE GLYCOL 3350 17 G/17G
17 POWDER, FOR SOLUTION ORAL DAILY PRN
Status: DISCONTINUED | OUTPATIENT
Start: 2022-06-04 | End: 2022-06-10 | Stop reason: HOSPADM

## 2022-06-04 RX ORDER — BISACODYL 10 MG
10 SUPPOSITORY, RECTAL RECTAL DAILY PRN
Status: DISCONTINUED | OUTPATIENT
Start: 2022-06-04 | End: 2022-06-10 | Stop reason: HOSPADM

## 2022-06-04 RX ORDER — BISACODYL 5 MG/1
5 TABLET, DELAYED RELEASE ORAL DAILY PRN
Status: DISCONTINUED | OUTPATIENT
Start: 2022-06-04 | End: 2022-06-10 | Stop reason: HOSPADM

## 2022-06-04 RX ORDER — FUROSEMIDE 40 MG/1
40 TABLET ORAL DAILY
Status: DISCONTINUED | OUTPATIENT
Start: 2022-06-04 | End: 2022-06-10 | Stop reason: HOSPADM

## 2022-06-04 RX ORDER — AMOXICILLIN 250 MG
2 CAPSULE ORAL 2 TIMES DAILY PRN
Status: DISCONTINUED | OUTPATIENT
Start: 2022-06-04 | End: 2022-06-10 | Stop reason: HOSPADM

## 2022-06-04 RX ADMIN — Medication 10 ML: at 09:10

## 2022-06-04 RX ADMIN — APIXABAN 5 MG: 5 TABLET, FILM COATED ORAL at 09:09

## 2022-06-04 RX ADMIN — FLUTICASONE PROPIONATE 2 SPRAY: 50 SPRAY, METERED NASAL at 09:13

## 2022-06-04 RX ADMIN — METOPROLOL SUCCINATE 100 MG: 100 TABLET, EXTENDED RELEASE ORAL at 09:09

## 2022-06-04 RX ADMIN — SACUBITRIL AND VALSARTAN 1 TABLET: 24; 26 TABLET, FILM COATED ORAL at 22:18

## 2022-06-04 RX ADMIN — AMLODIPINE BESYLATE 5 MG: 5 TABLET ORAL at 09:09

## 2022-06-04 RX ADMIN — APIXABAN 5 MG: 5 TABLET, FILM COATED ORAL at 22:18

## 2022-06-04 RX ADMIN — FUROSEMIDE 40 MG: 40 TABLET ORAL at 16:27

## 2022-06-04 RX ADMIN — BUMETANIDE 1 MG: 0.25 INJECTION INTRAMUSCULAR; INTRAVENOUS at 04:05

## 2022-06-04 RX ADMIN — ALLOPURINOL 300 MG: 300 TABLET ORAL at 09:09

## 2022-06-04 RX ADMIN — INSULIN LISPRO 4 UNITS: 100 INJECTION, SOLUTION INTRAVENOUS; SUBCUTANEOUS at 13:23

## 2022-06-04 RX ADMIN — EMPAGLIFLOZIN 10 MG: 10 TABLET, FILM COATED ORAL at 10:25

## 2022-06-04 RX ADMIN — INSULIN LISPRO 2 UNITS: 100 INJECTION, SOLUTION INTRAVENOUS; SUBCUTANEOUS at 18:17

## 2022-06-04 RX ADMIN — SACUBITRIL AND VALSARTAN 1 TABLET: 24; 26 TABLET, FILM COATED ORAL at 09:09

## 2022-06-04 RX ADMIN — Medication 10 ML: at 22:18

## 2022-06-04 RX ADMIN — INSULIN DETEMIR 20 UNITS: 100 INJECTION, SOLUTION SUBCUTANEOUS at 22:18

## 2022-06-04 NOTE — PROGRESS NOTES
[facility] Medicine Services  PROGRESS NOTE    Patient Name: Blane Dietz  : 1935  MRN: [id number]    Date of Admission: 2022  Primary Care Physician: David Em MD    Subjective   Subjective     CC:  F/U CHF    HPI:  He is feeling pretty good today.  A little tired.  Breathing has improved.    ROS:  Gen- No fevers, chills  Resp- As above  GI- No BM today    Objective   Objective     Vital Signs:   Temp:  [97.2 °F (36.2 °C)-98.1 °F (36.7 °C)] 98.1 °F (36.7 °C)  Heart Rate:  [56-74] 56  Resp:  [16-28] 16  BP: (115-143)/(55-86) 115/70  Flow (L/min):  [2] 2     Physical Exam:  Constitutional: No acute distress, awake, alert, sitting up in chair  HENT: NCAT, mucous membranes moist  Respiratory: Faint bibasilar crackles, respiratory effort normal on nasal cannula  Cardiovascular: RRR, no murmurs, rubs, or gallops  Gastrointestinal: Soft, nontender, nondistended  Musculoskeletal: Trace bilateral ankle edema  Psychiatric: Appropriate affect, cooperative  Neurologic: Speech clear and fluent  Skin: No rashes on exposed surfaces    Results Reviewed:  LAB RESULTS:      Lab 22  1510 22  1703   WBC 11.63* 9.69   HEMOGLOBIN 12.2* 12.9*   HEMATOCRIT 39.8 41.3   PLATELETS 210 188   NEUTROS ABS 8.89* 7.05*   IMMATURE GRANS (ABS) 0.17* 0.22*   LYMPHS ABS 1.22 1.30   MONOS ABS 1.08* 0.70   EOS ABS 0.09 0.21   MCV 87.9 87.3   CRP  --  <0.30   PROCALCITONIN  --  0.11   LACTATE  --  1.6   LDH  --  304*         Lab 22  0801 22  1510 22  1703   SODIUM 142 141 140   POTASSIUM 4.1 4.2 4.2   CHLORIDE 99 101 102   CO2 31.0* 30.0* 27.0   ANION GAP 12.0 10.0 11.0   BUN 28* 21 14   CREATININE 1.36* 1.28* 1.15   EGFR 51.3* 55.2* 62.8   GLUCOSE 138* 127* 277*   CALCIUM 9.1 10.1 9.2   MAGNESIUM  --  2.0  --    HEMOGLOBIN A1C  --  9.10*  --    TSH  --  1.580  --          Lab 22  1703   TOTAL PROTEIN 7.3   ALBUMIN 4.20   GLOBULIN 3.1   ALT (SGPT) 48*   AST (SGOT) 48*    BILIRUBIN 1.2   ALK PHOS 227*         Lab 06/03/22  1510 06/02/22  2131 06/02/22  1703   PROBNP  --   --  5,664.0*   TROPONIN T 0.052* 0.047* 0.041*         Lab 06/03/22  1510   CHOLESTEROL 169   LDL CHOL 78   HDL CHOL 74*   TRIGLYCERIDES 97         Lab 06/02/22  1703   FERRITIN 167.30         Lab 06/02/22  2351   PH, ARTERIAL 7.359   PCO2, ARTERIAL 46.0*   PO2 ART 87.7   FIO2 36   HCO3 ART 25.9   BASE EXCESS ART 0.0   CARBOXYHEMOGLOBIN 0.9     Brief Urine Lab Results  (Last result in the past 365 days)      Color   Clarity   Blood   Leuk Est   Nitrite   Protein   CREAT   Urine HCG        12/06/21 0908 Yellow   Clear   Negative   Negative   Negative   Negative                 Microbiology Results Abnormal     Procedure Component Value - Date/Time    COVID PRE-OP / PRE-PROCEDURE SCREENING ORDER (NO ISOLATION) - Swab, Nasopharynx [[id number]]  (Normal) Collected: 06/02/22 1650    Lab Status: Final result Specimen: Swab from Nasopharynx Updated: 06/02/22 1732    Narrative:      The following orders were created for panel order COVID PRE-OP / PRE-PROCEDURE SCREENING ORDER (NO ISOLATION) - Swab, Nasopharynx.  Procedure                               Abnormality         Status                     ---------                               -----------         ------                     COVID-19 and FLU A/B PCR...[[id number]]  Normal              Final result                 Please view results for these tests on the individual orders.    COVID-19 and FLU A/B PCR - Swab, Nasopharynx [[id number]]  (Normal) Collected: 06/02/22 1650    Lab Status: Final result Specimen: Swab from Nasopharynx Updated: 06/02/22 1732     COVID19 Not Detected     Influenza A PCR Not Detected     Influenza B PCR Not Detected    Narrative:      Fact sheet for providers: https://www.fda.gov/media/126010/download    Fact sheet for patients: https://www.fda.gov/media/109020/download    Test performed by PCR.          Adult Transthoracic Echo Complete w/  Color, Spectral and Contrast if necessary per protocol    Result Date: 6/3/2022  · There is a left pleural effusion. There is a right pleural effusion. · Left ventricular wall thickness is consistent with hypertrophy. · The right ventricular cavity is mildly dilated. · Left atrial volume is mildly increased. · The right atrial cavity is dilated. 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 · There is a bioprosthetic aortic valve present. · Peak velocity of the flow distal to the aortic valve is 105.1 cm/s. Aortic valve mean pressure gradient is 3 mmHg. · Aortic valve area is 2.9 cm2. · Moderate aortic valve regurgitation is present. · Moderate tricuspid valve regurgitation is present. · Estimated right ventricular systolic pressure from tricuspid regurgitation is moderately elevated (45-55 mmHg). Calculated right ventricular systolic pressure from tricuspid regurgitation is 52 mmHg. · Ejection fraction is low normal      US Liver    Result Date: 6/3/2022  DATE OF EXAM: 6/3/2022 9:58 AM  PROCEDURE: US LIVER-  INDICATIONS: Diabetes mellitus, heart failure, evaluate for hepatic cirrhosis.  COMPARISON: CT of the abdomen performed on 3/29/2021.  TECHNIQUE: Grayscale and color Doppler ultrasound evaluation of the limited abdomen was performed.  FINDINGS: There is increased hepatic echogenicity. The liver has a nodular contour. This is consistent with hepatic cirrhosis. There is a questionable hypoechoic lesion in the posterior aspect of the left lobe of the liver. It measures 1.6 x 1.6 x 1.2 cm. I would recommend further evaluation with a MRI of the abdomen with and without contrast versus a CT the abdomen with and without contrast. We are unable to perform our last the has the patient had labored breathing. There is normal directional flow in the main portal vein. The hepatic veins are patent demonstrating normal hepatofugal flow. The gallbladder is within range of normal. There are no echogenic shadowing stones.  The gallbladder wall thickness is normal. The common bile duct measures 4 mm. The right kidney is now surgically absent. The pancreas was not seen well.      Impression:  1. Hepatic cirrhosis. We were unable to perform a last artery feed due to labored breathing. 2. Questionable hypoechoic lesion in the posterior aspect of the left lobe of the liver measuring 1.6 x 1.6 x 1.2 cm. If the patient can tolerate a breath-hold, I would recommend an MRI of the abdomen with and without contrast. If the patient cannot tolerate a breath-hold, a CT of the abdomen with and without contrast with delayed imaging through the liver would also be of value. 3. Interval right nephrectomy. 4. The gallbladder and common bile duct are normal.  This report was finalized on 6/3/2022 2:20 PM by Brad Denis MD.      XR Chest 1 View    Result Date: 6/2/2022  XR CHEST 1 VW-  Date of Exam: 6/2/2022 4:44 PM  Indication: SOA triage protocol.  Comparison:?12/6/2021  Technique:?A single view of the chest was obtained.  FINDINGS:  ?Patient status post median sternotomy.  Cardiomegaly is stable. Pulmonary vessels are indistinct consistent with pulmonary vascular congestion.  There is hazy perihilar and bibasilar airspace disease favored to be due to pulmonary edema.  There are new small bilateral pleural effusions.        Impression:   1.  Cardiomegaly with pulmonary vascular congestion and bibasilar and perihilar airspace disease favored to be due to pulmonary edema. 2.  Small bilateral pleural effusions.   This report was finalized on 6/2/2022 5:05 PM by Long Ayala MD.      CT Angiogram Chest    Result Date: 6/2/2022  DATE OF EXAM: 6/2/2022 6:33 PM  PROCEDURE: CT ANGIOGRAM CHEST-  INDICATIONS: SOA w/ hx PE and noncompliant with eliquis  COMPARISON: 03/28/2021  TECHNIQUE: Contiguous axial imaging was obtained from the thoracic inlet through the upper abdomen following the intravenous administration of 80 mL of Isovue 370. Reconstructed coronal  and sagittal images were also obtained. Automated exposure control and iterative reconstruction methods were used.  The radiation dose reduction device was turned on for each scan per the ALARA (As Low as Reasonably Achievable) protocol.  FINDINGS: Pulmonary arteries: Adequate opacified. Some distortion of peripheral pulmonary arteries due to motion. Within that limitation, no acute emboli demonstrated. Mural calcification of the pulmonary trunk  Mediastinum: Aortic valve and coronary calcifications. No pericardial effusion. No mediastinal adenopathy. Thoracic aorta normal in caliber  Lungs/pleura: Moderate bilateral pleural effusions with secondary atelectasis in the lower lobes. Mild interlobular septal thickening and peribronchovascular interstitial thickening. Scattered foci of atelectasis in the remaining lungs  Upper abdomen: Nodular contour of the liver  Bones/soft tissues: No acute bony abnormality       Impression:  1. No evidence of acute pulmonary embolism. Mural calcination's of the pulmonary trunk may indicate pulmonary arterial hypertension 2. Findings compatible with interstitial edema and moderate bilateral pleural effusions 3. Cirrhotic morphology of the liver  This report was finalized on 6/2/2022 7:01 PM by Francisco Hummel.        Results for orders placed during the hospital encounter of 06/02/22    Adult Transthoracic Echo Complete w/ Color, Spectral and Contrast if necessary per protocol    Interpretation Summary  · There is a left pleural effusion. There is a right pleural effusion.  · Left ventricular wall thickness is consistent with hypertrophy.  · The right ventricular cavity is mildly dilated.  · Left atrial volume is mildly increased.  · The right atrial cavity is dilated.  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  · There is a bioprosthetic aortic valve present.  · Peak velocity of the flow distal to the aortic valve is 105.1 cm/s. Aortic valve mean pressure gradient is 3  mmHg.  · Aortic valve area is 2.9 cm2.  · Moderate aortic valve regurgitation is present.  · Moderate tricuspid valve regurgitation is present.  · Estimated right ventricular systolic pressure from tricuspid regurgitation is moderately elevated (45-55 mmHg). Calculated right ventricular systolic pressure from tricuspid regurgitation is 52 mmHg.  · Ejection fraction is low normal      I have reviewed the medications:  Scheduled Meds:allopurinol, 300 mg, Oral, Daily  amLODIPine, 5 mg, Oral, Daily  apixaban, 5 mg, Oral, Q12H  empagliflozin, 10 mg, Oral, Daily  fluticasone, 2 spray, Nasal, Daily  insulin detemir, 20 Units, Subcutaneous, Nightly  insulin lispro, 0-9 Units, Subcutaneous, TID AC  metoprolol succinate XL, 100 mg, Oral, Daily  pharmacy consult - MTM, , Does not apply, Daily  sacubitril-valsartan, 1 tablet, Oral, Q12H  sodium chloride, 10 mL, Intravenous, Q12H      Continuous Infusions:   PRN Meds:.•  acetaminophen **OR** acetaminophen **OR** acetaminophen  •  dextrose  •  dextrose  •  docusate sodium  •  glucagon (human recombinant)  •  HYDROcodone-acetaminophen  •  melatonin  •  sodium chloride  •  sodium chloride    Assessment & Plan   Assessment & Plan     Active Hospital Problems    Diagnosis  POA   • **Acute on chronic respiratory failure with hypoxia (HCC) [J96.21]  Unknown   • Elevated troponin [R77.8]  Unknown   • Cirrhosis of liver (HCC) [K74.60]  Unknown   • Elevated transaminase level [R74.01]  Unknown   • Pleural effusion, bilateral [J90]  Unknown   • Normocytic anemia [D64.9]  Unknown   • History of pulmonary embolism [Z86.711]  Unknown   • Acute on chronic congestive heart failure, unspecified heart failure type (HCC) [I50.9]  Yes   • 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        Brief Hospital Course to date:  Blane Dietz is a 86 y.o. 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    This patient's problems and plans were partially entered by my partner and updated as appropriate by me 06/04/22.    Acute on chronic hypoxic respiratory failure  Acute exacerbation of diastolic CHF  Bilateral pleural effusions  Aortic stenosis s/p ROSS procedure  Elevated troponin  HUSSAIN on CPAP  --Likely due to medication non-adherence  --S/P IV diuresis  --Cardiology following, can likely resume PO diuretics but will defer to them  --Resumed home jardiance     Elevated transaminase levels  Cirrhosis  Liver lesion  -Imaging concerning for cirrhosis with lesion in left lobe of liver  --Will need additional imaging with MRI or CT liver mass protocol as an outpatient  --Outpatient GI follow up-will place referral at discharge.  Discussed with patient.     History of pulmonary embolism  -Completed 1 year of Eliquis therapy.  Per Dr. Ramon's last note, patient was instructed to discontinue Eliquis in March.    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 Ramon outpatient     Diabetes mellitus type 2  -Has not been taking insulin for over 10  days.  -Contininue Levemir 20 units nightly with moderate SSI, resumed jardiance  -Last A1c was 8.8%    DVT prophylaxis:  Medical DVT prophylaxis orders are present.       AM-PAC 6 Clicks Score (PT): 18 (06/04/22 0800)    Disposition: I expect the patient to be discharged pending rehab placement.    CODE STATUS:   Code Status and Medical Interventions:   Ordered at: 06/03/22 0004     Level Of Support Discussed With:    Patient     Code Status (Patient has no pulse and is not breathing):    CPR (Attempt to Resuscitate)     Medical Interventions (Patient has pulse or is breathing):    Full Support       Kirstin Estevez MD  06/04/22

## 2022-06-05 LAB
ANION GAP SERPL CALCULATED.3IONS-SCNC: 9 MMOL/L (ref 5–15)
BUN SERPL-MCNC: 36 MG/DL (ref 8–23)
BUN/CREAT SERPL: 25.5 (ref 7–25)
CALCIUM SPEC-SCNC: 9.5 MG/DL (ref 8.6–10.5)
CHLORIDE SERPL-SCNC: 101 MMOL/L (ref 98–107)
CO2 SERPL-SCNC: 35 MMOL/L (ref 22–29)
CREAT SERPL-MCNC: 1.41 MG/DL (ref 0.76–1.27)
EGFRCR SERPLBLD CKD-EPI 2021: 49.1 ML/MIN/1.73
GLUCOSE BLDC GLUCOMTR-MCNC: 167 MG/DL (ref 70–130)
GLUCOSE BLDC GLUCOMTR-MCNC: 193 MG/DL (ref 70–130)
GLUCOSE BLDC GLUCOMTR-MCNC: 221 MG/DL (ref 70–130)
GLUCOSE SERPL-MCNC: 147 MG/DL (ref 65–99)
POTASSIUM SERPL-SCNC: 3.8 MMOL/L (ref 3.5–5.2)
QT INTERVAL: 394 MS
QT INTERVAL: 408 MS
QTC INTERVAL: 520 MS
QTC INTERVAL: 540 MS
SODIUM SERPL-SCNC: 145 MMOL/L (ref 136–145)

## 2022-06-05 PROCEDURE — 94799 UNLISTED PULMONARY SVC/PX: CPT

## 2022-06-05 PROCEDURE — 82962 GLUCOSE BLOOD TEST: CPT

## 2022-06-05 PROCEDURE — 94660 CPAP INITIATION&MGMT: CPT

## 2022-06-05 PROCEDURE — 63710000001 INSULIN LISPRO (HUMAN) PER 5 UNITS: Performed by: INTERNAL MEDICINE

## 2022-06-05 PROCEDURE — 80048 BASIC METABOLIC PNL TOTAL CA: CPT | Performed by: INTERNAL MEDICINE

## 2022-06-05 PROCEDURE — G0378 HOSPITAL OBSERVATION PER HR: HCPCS

## 2022-06-05 PROCEDURE — 83735 ASSAY OF MAGNESIUM: CPT | Performed by: NURSE PRACTITIONER

## 2022-06-05 PROCEDURE — 93010 ELECTROCARDIOGRAM REPORT: CPT | Performed by: INTERNAL MEDICINE

## 2022-06-05 PROCEDURE — 63710000001 INSULIN DETEMIR PER 5 UNITS: Performed by: INTERNAL MEDICINE

## 2022-06-05 PROCEDURE — 99225 PR SBSQ OBSERVATION CARE/DAY 25 MINUTES: CPT | Performed by: INTERNAL MEDICINE

## 2022-06-05 PROCEDURE — 93005 ELECTROCARDIOGRAM TRACING: CPT | Performed by: INTERNAL MEDICINE

## 2022-06-05 RX ADMIN — EMPAGLIFLOZIN 10 MG: 10 TABLET, FILM COATED ORAL at 09:12

## 2022-06-05 RX ADMIN — METOPROLOL SUCCINATE 100 MG: 100 TABLET, EXTENDED RELEASE ORAL at 08:59

## 2022-06-05 RX ADMIN — SACUBITRIL AND VALSARTAN 1 TABLET: 24; 26 TABLET, FILM COATED ORAL at 08:59

## 2022-06-05 RX ADMIN — Medication 10 ML: at 22:54

## 2022-06-05 RX ADMIN — Medication 10 ML: at 09:06

## 2022-06-05 RX ADMIN — INSULIN LISPRO 4 UNITS: 100 INJECTION, SOLUTION INTRAVENOUS; SUBCUTANEOUS at 17:40

## 2022-06-05 RX ADMIN — APIXABAN 5 MG: 5 TABLET, FILM COATED ORAL at 22:54

## 2022-06-05 RX ADMIN — ALLOPURINOL 300 MG: 300 TABLET ORAL at 08:59

## 2022-06-05 RX ADMIN — INSULIN DETEMIR 20 UNITS: 100 INJECTION, SOLUTION SUBCUTANEOUS at 22:56

## 2022-06-05 RX ADMIN — INSULIN LISPRO 2 UNITS: 100 INJECTION, SOLUTION INTRAVENOUS; SUBCUTANEOUS at 08:58

## 2022-06-05 RX ADMIN — APIXABAN 5 MG: 5 TABLET, FILM COATED ORAL at 08:59

## 2022-06-05 RX ADMIN — FLUTICASONE PROPIONATE 2 SPRAY: 50 SPRAY, METERED NASAL at 09:05

## 2022-06-05 RX ADMIN — INSULIN LISPRO 2 UNITS: 100 INJECTION, SOLUTION INTRAVENOUS; SUBCUTANEOUS at 12:47

## 2022-06-05 RX ADMIN — AMLODIPINE BESYLATE 5 MG: 5 TABLET ORAL at 08:59

## 2022-06-05 RX ADMIN — FUROSEMIDE 40 MG: 40 TABLET ORAL at 08:59

## 2022-06-05 NOTE — PROGRESS NOTES
[facility] Medicine Services  PROGRESS NOTE    Patient Name: Blane Dietz  : 1935  MRN: [id number]    Date of Admission: 2022  Primary Care Physician: David Em MD    Subjective   Subjective     CC:  F/U CHF    HPI:  Feels great today.    ROS:  Gen- No fevers, chills  Resp- No dyspnea    Objective   Objective     Vital Signs:   Temp:  [97.1 °F (36.2 °C)-98.1 °F (36.7 °C)] 97.1 °F (36.2 °C)  Heart Rate:  [56-80] 59  Resp:  [16-20] 20  BP: (107-132)/() 107/68  Flow (L/min):  [2] 2     Physical Exam:  Constitutional: No acute distress, awake, alert, sitting up in chair  HENT: NCAT, mucous membranes moist  Respiratory: Respiratory effort normal on nasal cannula  Cardiovascular: RRR  Musculoskeletal: Trace bilateral ankle edema  Psychiatric: Appropriate affect, cooperative  Neurologic: Speech clear and fluent  Skin: No rashes on exposed surfaces    Results Reviewed:  LAB RESULTS:      Lab 22  1510 22  1703   WBC 11.63* 9.69   HEMOGLOBIN 12.2* 12.9*   HEMATOCRIT 39.8 41.3   PLATELETS 210 188   NEUTROS ABS 8.89* 7.05*   IMMATURE GRANS (ABS) 0.17* 0.22*   LYMPHS ABS 1.22 1.30   MONOS ABS 1.08* 0.70   EOS ABS 0.09 0.21   MCV 87.9 87.3   CRP  --  <0.30   PROCALCITONIN  --  0.11   LACTATE  --  1.6   LDH  --  304*         Lab 22  0801 22  1510 22  1703   SODIUM 142 141 140   POTASSIUM 4.1 4.2 4.2   CHLORIDE 99 101 102   CO2 31.0* 30.0* 27.0   ANION GAP 12.0 10.0 11.0   BUN 28* 21 14   CREATININE 1.36* 1.28* 1.15   EGFR 51.3* 55.2* 62.8   GLUCOSE 138* 127* 277*   CALCIUM 9.1 10.1 9.2   MAGNESIUM  --  2.0  --    HEMOGLOBIN A1C  --  9.10*  --    TSH  --  1.580  --          Lab 22  1703   TOTAL PROTEIN 7.3   ALBUMIN 4.20   GLOBULIN 3.1   ALT (SGPT) 48*   AST (SGOT) 48*   BILIRUBIN 1.2   ALK PHOS 227*         Lab 22  1510 22  2131 22  1703   PROBNP  --   --  5,664.0*   TROPONIN T 0.052* 0.047* 0.041*         Lab 22  1510    CHOLESTEROL 169   LDL CHOL 78   HDL CHOL 74*   TRIGLYCERIDES 97         Lab 06/02/22  1703   FERRITIN 167.30         Lab 06/02/22  2351   PH, ARTERIAL 7.359   PCO2, ARTERIAL 46.0*   PO2 ART 87.7   FIO2 36   HCO3 ART 25.9   BASE EXCESS ART 0.0   CARBOXYHEMOGLOBIN 0.9     Brief Urine Lab Results  (Last result in the past 365 days)      Color   Clarity   Blood   Leuk Est   Nitrite   Protein   CREAT   Urine HCG        12/06/21 0908 Yellow   Clear   Negative   Negative   Negative   Negative                 Microbiology Results Abnormal     Procedure Component Value - Date/Time    COVID PRE-OP / PRE-PROCEDURE SCREENING ORDER (NO ISOLATION) - Swab, Nasopharynx [[id number]]  (Normal) Collected: 06/02/22 1650    Lab Status: Final result Specimen: Swab from Nasopharynx Updated: 06/02/22 1732    Narrative:      The following orders were created for panel order COVID PRE-OP / PRE-PROCEDURE SCREENING ORDER (NO ISOLATION) - Swab, Nasopharynx.  Procedure                               Abnormality         Status                     ---------                               -----------         ------                     COVID-19 and FLU A/B PCR...[[id number]]  Normal              Final result                 Please view results for these tests on the individual orders.    COVID-19 and FLU A/B PCR - Swab, Nasopharynx [[id number]]  (Normal) Collected: 06/02/22 1650    Lab Status: Final result Specimen: Swab from Nasopharynx Updated: 06/02/22 1732     COVID19 Not Detected     Influenza A PCR Not Detected     Influenza B PCR Not Detected    Narrative:      Fact sheet for providers: https://www.fda.gov/media/184648/download    Fact sheet for patients: https://www.fda.gov/media/677144/download    Test performed by PCR.          Adult Transthoracic Echo Complete w/ Color, Spectral and Contrast if necessary per protocol    Result Date: 6/3/2022  · There is a left pleural effusion. There is a right pleural effusion. · Left ventricular wall  thickness is consistent with hypertrophy. · The right ventricular cavity is mildly dilated. · Left atrial volume is mildly increased. · The right atrial cavity is dilated. 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 · There is a bioprosthetic aortic valve present. · Peak velocity of the flow distal to the aortic valve is 105.1 cm/s. Aortic valve mean pressure gradient is 3 mmHg. · Aortic valve area is 2.9 cm2. · Moderate aortic valve regurgitation is present. · Moderate tricuspid valve regurgitation is present. · Estimated right ventricular systolic pressure from tricuspid regurgitation is moderately elevated (45-55 mmHg). Calculated right ventricular systolic pressure from tricuspid regurgitation is 52 mmHg. · Ejection fraction is low normal      US Liver    Result Date: 6/3/2022  DATE OF EXAM: 6/3/2022 9:58 AM  PROCEDURE: US LIVER-  INDICATIONS: Diabetes mellitus, heart failure, evaluate for hepatic cirrhosis.  COMPARISON: CT of the abdomen performed on 3/29/2021.  TECHNIQUE: Grayscale and color Doppler ultrasound evaluation of the limited abdomen was performed.  FINDINGS: There is increased hepatic echogenicity. The liver has a nodular contour. This is consistent with hepatic cirrhosis. There is a questionable hypoechoic lesion in the posterior aspect of the left lobe of the liver. It measures 1.6 x 1.6 x 1.2 cm. I would recommend further evaluation with a MRI of the abdomen with and without contrast versus a CT the abdomen with and without contrast. We are unable to perform our last the has the patient had labored breathing. There is normal directional flow in the main portal vein. The hepatic veins are patent demonstrating normal hepatofugal flow. The gallbladder is within range of normal. There are no echogenic shadowing stones. The gallbladder wall thickness is normal. The common bile duct measures 4 mm. The right kidney is now surgically absent. The pancreas was not seen well.      Impression:  1.  Hepatic cirrhosis. We were unable to perform a last artery feed due to labored breathing. 2. Questionable hypoechoic lesion in the posterior aspect of the left lobe of the liver measuring 1.6 x 1.6 x 1.2 cm. If the patient can tolerate a breath-hold, I would recommend an MRI of the abdomen with and without contrast. If the patient cannot tolerate a breath-hold, a CT of the abdomen with and without contrast with delayed imaging through the liver would also be of value. 3. Interval right nephrectomy. 4. The gallbladder and common bile duct are normal.  This report was finalized on 6/3/2022 2:20 PM by Brad Denis MD.        Results for orders placed during the hospital encounter of 06/02/22    Adult Transthoracic Echo Complete w/ Color, Spectral and Contrast if necessary per protocol    Interpretation Summary  · There is a left pleural effusion. There is a right pleural effusion.  · Left ventricular wall thickness is consistent with hypertrophy.  · The right ventricular cavity is mildly dilated.  · Left atrial volume is mildly increased.  · The right atrial cavity is dilated.  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  · There is a bioprosthetic aortic valve present.  · Peak velocity of the flow distal to the aortic valve is 105.1 cm/s. Aortic valve mean pressure gradient is 3 mmHg.  · Aortic valve area is 2.9 cm2.  · Moderate aortic valve regurgitation is present.  · Moderate tricuspid valve regurgitation is present.  · Estimated right ventricular systolic pressure from tricuspid regurgitation is moderately elevated (45-55 mmHg). Calculated right ventricular systolic pressure from tricuspid regurgitation is 52 mmHg.  · Ejection fraction is low normal      I have reviewed the medications:  Scheduled Meds:allopurinol, 300 mg, Oral, Daily  amLODIPine, 5 mg, Oral, Daily  apixaban, 5 mg, Oral, Q12H  empagliflozin, 10 mg, Oral, Daily  fluticasone, 2 spray, Nasal, Daily  furosemide, 40 mg, Oral, Daily  insulin  detemir, 20 Units, Subcutaneous, Nightly  insulin lispro, 0-9 Units, Subcutaneous, TID AC  metoprolol succinate XL, 100 mg, Oral, Daily  pharmacy consult - MTM, , Does not apply, Daily  sacubitril-valsartan, 1 tablet, Oral, Q12H  sodium chloride, 10 mL, Intravenous, Q12H      Continuous Infusions:   PRN Meds:.•  acetaminophen **OR** acetaminophen **OR** acetaminophen  •  senna-docusate sodium **AND** polyethylene glycol **AND** bisacodyl **AND** bisacodyl  •  dextrose  •  dextrose  •  glucagon (human recombinant)  •  HYDROcodone-acetaminophen  •  melatonin  •  sodium chloride  •  sodium chloride    Assessment & Plan   Assessment & Plan     Active Hospital Problems    Diagnosis  POA   • **Acute on chronic respiratory failure with hypoxia (HCC) [J96.21]  Unknown   • Elevated troponin [R77.8]  Unknown   • Cirrhosis of liver (HCC) [K74.60]  Unknown   • Elevated transaminase level [R74.01]  Unknown   • Pleural effusion, bilateral [J90]  Unknown   • Normocytic anemia [D64.9]  Unknown   • History of pulmonary embolism [Z86.711]  Unknown   • Acute on chronic congestive heart failure, unspecified heart failure type (HCC) [I50.9]  Yes   • 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        Brief Hospital Course to date:  Blane Dietz is a 86 y.o. 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    This patient's problems and plans were partially entered by my partner and updated as appropriate by me 06/05/22.    Acute on chronic hypoxic respiratory failure  Acute exacerbation of diastolic CHF  Bilateral pleural effusions  Aortic stenosis s/p ROSS procedure  Elevated troponin  HUSSAIN on CPAP  --Likely due to medication non-adherence  --S/P IV diuresis  --Cardiology following, transitioned to PO lasix, started entresto  --Continue home jardiance  --BMP     Elevated transaminase levels  Cirrhosis  Liver lesion  -Imaging concerning for cirrhosis with lesion in left lobe of liver  --Will need additional imaging with MRI or CT liver mass protocol as an outpatient  --Outpatient GI follow up-will place referral at discharge.  Discussed with patient.     History of pulmonary embolism  -Completed 1 year of Eliquis therapy.  Per Dr. Ramon's last note, patient was instructed to discontinue Eliquis in March.    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 Ramon outpatient     Diabetes mellitus type 2  -Has not been taking insulin for over 10 days.  -Contininue Levemir 20 units nightly with moderate SSI, jardiance  -Last A1c was 8.8%    DVT prophylaxis:  Medical DVT prophylaxis orders are present.       AM-PAC 6 Clicks Score (PT): 18 (06/04/22 0800)    Disposition: I expect the patient to be discharged pending rehab placement.    CODE STATUS:   Code Status and Medical Interventions:   Ordered at: 06/03/22 0004     Level Of Support Discussed With:    Patient     Code Status (Patient has no pulse and is not breathing):    CPR (Attempt to Resuscitate)     Medical  Interventions (Patient has pulse or is breathing):    Full Support       Kirstin Estevez MD  06/05/22

## 2022-06-05 NOTE — PROGRESS NOTES
"                                 [facility] Progress Note      LOS: 1 day   Patient Care Team:  David Em MD as PCP - General (Internal Medicine)  Ha Toussaint MD as Consulting Physician (Cardiology)  Guero Grande MD as Consulting Physician (Cardiology)  Shannan Ramon MD as Consulting Physician (Hematology and Oncology)  Xavier Boston MD as Consulting Physician (Endocrinology)    Chief Complaint:    Chief Complaint   Patient presents with   • Shortness of Breath       Subjective     Interval History: Quiet night    Patient Complaints: No angina or dyspnea      Review of Systems:   A 14 point review of systems was negative except as was stated in the HPI      Objective     Vital Sign Min/Max for last 24 hours  Temp  Min: 97.1 °F (36.2 °C)  Max: 98.1 °F (36.7 °C)   BP  Min: 107/68  Max: 132/78   Pulse  Min: 56  Max: 80   Resp  Min: 16  Max: 20   SpO2  Min: 93 %  Max: 99 %   Flow (L/min)  Min: 2  Max: 2   No data recorded     Flowsheet Rows    Flowsheet Row First Filed Value   Admission Height 167.6 cm (66\") Documented at 06/02/2022 1627   Admission Weight 85.3 kg (188 lb) Documented at 06/02/2022 1627          Physical Exam:  General Appearance: Alert, appears stated age and cooperative  Lungs: Clear to auscultation  Heart:: RRR   occasional ectopy with 1/6 systolic murmur right upper sternal border  Abdomen: Soft and nontender with adequate bowel sounds.  No organomegaly  Extremities: No cyanosis, clubbing or edema  Pulses: Pulses palpable and equal bilaterally  Skin: Warm and dry with no rash  Psych: Normal     Results Review:     I reviewed the patient's new clinical results.  Results from last 7 days   Lab Units 06/04/22  0801 06/03/22  1510 06/02/22  1703   SODIUM mmol/L 142 141 140   POTASSIUM mmol/L 4.1 4.2 4.2   CHLORIDE mmol/L 99 101 102   CO2 mmol/L 31.0* 30.0* 27.0   BUN mg/dL 28* 21 14   CREATININE mg/dL 1.36* 1.28* 1.15   GLUCOSE mg/dL 138* 127* 277* "   CALCIUM mg/dL 9.1 10.1 9.2     Results from last 7 days   Lab Units 06/03/22  1510 06/02/22  1703   WBC 10*3/mm3 11.63* 9.69   HEMOGLOBIN g/dL 12.2* 12.9*   HEMATOCRIT % 39.8 41.3   PLATELETS 10*3/mm3 210 188     Lab Results   Lab Value Date/Time    TROPONINT 0.052 (C) 06/03/2022 1510    TROPONINT 0.047 (C) 06/02/2022 2131    TROPONINT 0.041 (C) 06/02/2022 1703    TROPONINT 0.012 03/28/2021 2025     Results from last 7 days   Lab Units 06/03/22  1510   CHOLESTEROL mg/dL 169   TRIGLYCERIDES mg/dL 97   HDL CHOL mg/dL 74*   LDL CHOL mg/dL 78         Results from last 7 days   Lab Units 06/02/22  2351   PH, ARTERIAL pH units 7.359   PO2 ART mm Hg 87.7   PCO2, ARTERIAL mm Hg 46.0*   HCO3 ART mmol/L 25.9           Medication Review: yes  Current Facility-Administered Medications   Medication Dose Route Frequency Provider Last Rate Last Admin   • acetaminophen (TYLENOL) tablet 650 mg  650 mg Oral Q4H PRN Laith, Courtney G, DO        Or   • acetaminophen (TYLENOL) 160 MG/5ML solution 650 mg  650 mg Oral Q4H PRN Laith, Courtney G, DO        Or   • acetaminophen (TYLENOL) suppository 650 mg  650 mg Rectal Q4H PRN Laith, Courtney G, DO       • allopurinol (ZYLOPRIM) tablet 300 mg  300 mg Oral Daily Laith, Courtney G, DO   300 mg at 06/04/22 0909   • amLODIPine (NORVASC) tablet 5 mg  5 mg Oral Daily Laith, Courtney G, DO   5 mg at 06/04/22 0909   • apixaban (ELIQUIS) tablet 5 mg  5 mg Oral Q12H Ginny Cramer PA   5 mg at 06/04/22 2218   • sennosides-docusate (PERICOLACE) 8.6-50 MG per tablet 2 tablet  2 tablet Oral BID PRN Kirstin Estevez MD        And   • polyethylene glycol (MIRALAX) packet 17 g  17 g Oral Daily PRN Kirstin Estevez MD        And   • bisacodyl (DULCOLAX) EC tablet 5 mg  5 mg Oral Daily PRN Kirstin Estevez MD        And   • bisacodyl (DULCOLAX) suppository 10 mg  10 mg Rectal Daily PRN Kirstin Estevez MD       • dextrose (D50W) (25 g/50 mL) IV injection 25 g  25 g Intravenous Q15 Min PRN Courtney Ozuna DO       •  dextrose (GLUTOSE) oral gel 15 g  15 g Oral Q15 Min PRN LaithMary tylersie G, DO       • empagliflozin (JARDIANCE) tablet 10 mg  10 mg Oral Daily Kirstin Estevez MD   10 mg at 06/04/22 1025   • fluticasone (FLONASE) 50 MCG/ACT nasal spray 2 spray  2 spray Nasal Daily LaithCourtney tyler G, DO   2 spray at 06/04/22 0913   • furosemide (LASIX) tablet 40 mg  40 mg Oral Daily Ha Toussaint MD   40 mg at 06/04/22 1627   • glucagon (human recombinant) (GLUCAGEN DIAGNOSTIC) injection 1 mg  1 mg Intramuscular Q15 Min PRN LaithMary tylersie G, DO       • HYDROcodone-acetaminophen (NORCO) 7.5-325 MG per tablet 1 tablet  1 tablet Oral Q6H PRN LaithMary tylersie G, DO       • insulin detemir (LEVEMIR) injection 20 Units  20 Units Subcutaneous Nightly Courtney Ozuna G, DO   20 Units at 06/04/22 2218   • Insulin Lispro (humaLOG) injection 0-9 Units  0-9 Units Subcutaneous TID AC Courtney Ozuna G, DO   2 Units at 06/04/22 1817   • melatonin tablet 5 mg  5 mg Oral Nightly PRN Courtney Ozuna G, DO       • metoprolol succinate XL (TOPROL-XL) 24 hr tablet 100 mg  100 mg Oral Daily LaithMary tylersie G, DO   100 mg at 06/04/22 0909   • Pharmacy Consult - MTM   Does not apply Daily Ellen Butler, PharmD       • sacubitril-valsartan (ENTRESTO) 24-26 MG tablet 1 tablet  1 tablet Oral Q12H Ginny Cramer PA   1 tablet at 06/04/22 2218   • sodium chloride 0.9 % flush 10 mL  10 mL Intravenous PRN LaithMary tylersie G, DO       • sodium chloride 0.9 % flush 10 mL  10 mL Intravenous Q12H LaithMayr tylersie G, DO   10 mL at 06/04/22 2218   • sodium chloride 0.9 % flush 10 mL  10 mL Intravenous PRN LaithMary tylersie G, DO             Acute on chronic respiratory failure with hypoxia (HCC)    Essential hypertension    Uncontrolled type 2 diabetes mellitus with hyperglycemia (HCC)    Prostate cancer (HCC)    Aortic stenosis    Sleep apnea    S/P AVR    HLD (hyperlipidemia)    Elevated LFTs    s/p right nepheroureterectomy and adrenalectomy     Diffuse large B-cell lymphoma of  solid organ excluding spleen (HCC)    Acute on chronic congestive heart failure, unspecified heart failure type (HCC)    Elevated troponin    Cirrhosis of liver (HCC)    Elevated transaminase level    Pleural effusion, bilateral    Normocytic anemia    History of pulmonary embolism        Impression      As previously noted.  Presently stable with addition of Entresto to medical regimen.  He is back on oral diuretic therapy.        Plan     Heart failure compensated and blood pressure with satisfactory control on present medical therapy.  I suspect he will do well depending upon medical compliance        Ha Toussaint MD   06/05/22  08:26 EDT

## 2022-06-06 LAB
ANION GAP SERPL CALCULATED.3IONS-SCNC: 10 MMOL/L (ref 5–15)
BH CV LOWER VASCULAR LEFT COMMON FEMORAL AUGMENT: NORMAL
BH CV LOWER VASCULAR LEFT COMMON FEMORAL COMPRESS: NORMAL
BH CV LOWER VASCULAR LEFT COMMON FEMORAL PHASIC: NORMAL
BH CV LOWER VASCULAR LEFT COMMON FEMORAL SPONT: NORMAL
BH CV LOWER VASCULAR LEFT DISTAL FEMORAL COMPRESS: NORMAL
BH CV LOWER VASCULAR LEFT GASTRONEMIUS COMPRESS: NORMAL
BH CV LOWER VASCULAR LEFT GREATER SAPH AK COMPRESS: NORMAL
BH CV LOWER VASCULAR LEFT GREATER SAPH BK COMPRESS: NORMAL
BH CV LOWER VASCULAR LEFT LESSER SAPH COMPRESS: NORMAL
BH CV LOWER VASCULAR LEFT MID FEMORAL AUGMENT: NORMAL
BH CV LOWER VASCULAR LEFT MID FEMORAL COMPRESS: NORMAL
BH CV LOWER VASCULAR LEFT MID FEMORAL PHASIC: NORMAL
BH CV LOWER VASCULAR LEFT MID FEMORAL SPONT: NORMAL
BH CV LOWER VASCULAR LEFT PERONEAL COMPRESS: NORMAL
BH CV LOWER VASCULAR LEFT POPLITEAL AUGMENT: NORMAL
BH CV LOWER VASCULAR LEFT POPLITEAL COMPRESS: NORMAL
BH CV LOWER VASCULAR LEFT POPLITEAL PHASIC: NORMAL
BH CV LOWER VASCULAR LEFT POPLITEAL SPONT: NORMAL
BH CV LOWER VASCULAR LEFT POSTERIOR TIBIAL COMPRESS: NORMAL
BH CV LOWER VASCULAR LEFT PROFUNDA FEMORAL COMPRESS: NORMAL
BH CV LOWER VASCULAR LEFT PROXIMAL FEMORAL COMPRESS: NORMAL
BH CV LOWER VASCULAR LEFT SAPHENOFEMORAL JUNCTION COMPRESS: NORMAL
BH CV LOWER VASCULAR RIGHT COMMON FEMORAL AUGMENT: NORMAL
BH CV LOWER VASCULAR RIGHT COMMON FEMORAL COMPRESS: NORMAL
BH CV LOWER VASCULAR RIGHT COMMON FEMORAL PHASIC: NORMAL
BH CV LOWER VASCULAR RIGHT COMMON FEMORAL SPONT: NORMAL
BUN SERPL-MCNC: 42 MG/DL (ref 8–23)
BUN/CREAT SERPL: 30 (ref 7–25)
CALCIUM SPEC-SCNC: 9.6 MG/DL (ref 8.6–10.5)
CHLORIDE SERPL-SCNC: 101 MMOL/L (ref 98–107)
CO2 SERPL-SCNC: 31 MMOL/L (ref 22–29)
CREAT SERPL-MCNC: 1.4 MG/DL (ref 0.76–1.27)
EGFRCR SERPLBLD CKD-EPI 2021: 49.6 ML/MIN/1.73
GLUCOSE BLDC GLUCOMTR-MCNC: 234 MG/DL (ref 70–130)
GLUCOSE BLDC GLUCOMTR-MCNC: 237 MG/DL (ref 70–130)
GLUCOSE BLDC GLUCOMTR-MCNC: 243 MG/DL (ref 70–130)
GLUCOSE BLDC GLUCOMTR-MCNC: 91 MG/DL (ref 70–130)
GLUCOSE SERPL-MCNC: 94 MG/DL (ref 65–99)
MAGNESIUM SERPL-MCNC: 2.1 MG/DL (ref 1.6–2.4)
MAXIMAL PREDICTED HEART RATE: 136 BPM
POTASSIUM SERPL-SCNC: 3.8 MMOL/L (ref 3.5–5.2)
QT INTERVAL: 468 MS
QTC INTERVAL: 508 MS
SODIUM SERPL-SCNC: 142 MMOL/L (ref 136–145)
STRESS TARGET HR: 116 BPM

## 2022-06-06 PROCEDURE — 82962 GLUCOSE BLOOD TEST: CPT

## 2022-06-06 PROCEDURE — 99225 PR SBSQ OBSERVATION CARE/DAY 25 MINUTES: CPT | Performed by: INTERNAL MEDICINE

## 2022-06-06 PROCEDURE — 94660 CPAP INITIATION&MGMT: CPT

## 2022-06-06 PROCEDURE — G0378 HOSPITAL OBSERVATION PER HR: HCPCS

## 2022-06-06 PROCEDURE — 80048 BASIC METABOLIC PNL TOTAL CA: CPT | Performed by: INTERNAL MEDICINE

## 2022-06-06 PROCEDURE — 63710000001 INSULIN LISPRO (HUMAN) PER 5 UNITS: Performed by: INTERNAL MEDICINE

## 2022-06-06 PROCEDURE — 94799 UNLISTED PULMONARY SVC/PX: CPT

## 2022-06-06 PROCEDURE — 63710000001 INSULIN DETEMIR PER 5 UNITS: Performed by: INTERNAL MEDICINE

## 2022-06-06 RX ADMIN — APIXABAN 5 MG: 5 TABLET, FILM COATED ORAL at 09:04

## 2022-06-06 RX ADMIN — AMLODIPINE BESYLATE 5 MG: 5 TABLET ORAL at 09:04

## 2022-06-06 RX ADMIN — INSULIN DETEMIR 20 UNITS: 100 INJECTION, SOLUTION SUBCUTANEOUS at 22:17

## 2022-06-06 RX ADMIN — ALLOPURINOL 300 MG: 300 TABLET ORAL at 09:04

## 2022-06-06 RX ADMIN — FUROSEMIDE 40 MG: 40 TABLET ORAL at 09:04

## 2022-06-06 RX ADMIN — INSULIN LISPRO 4 UNITS: 100 INJECTION, SOLUTION INTRAVENOUS; SUBCUTANEOUS at 17:47

## 2022-06-06 RX ADMIN — EMPAGLIFLOZIN 10 MG: 10 TABLET, FILM COATED ORAL at 09:04

## 2022-06-06 RX ADMIN — APIXABAN 5 MG: 5 TABLET, FILM COATED ORAL at 21:57

## 2022-06-06 RX ADMIN — INSULIN LISPRO 4 UNITS: 100 INJECTION, SOLUTION INTRAVENOUS; SUBCUTANEOUS at 12:52

## 2022-06-06 RX ADMIN — Medication 10 ML: at 09:04

## 2022-06-06 RX ADMIN — SACUBITRIL AND VALSARTAN 1 TABLET: 24; 26 TABLET, FILM COATED ORAL at 09:04

## 2022-06-06 RX ADMIN — METOPROLOL SUCCINATE 100 MG: 100 TABLET, EXTENDED RELEASE ORAL at 09:04

## 2022-06-06 RX ADMIN — FLUTICASONE PROPIONATE 2 SPRAY: 50 SPRAY, METERED NASAL at 09:10

## 2022-06-06 RX ADMIN — SACUBITRIL AND VALSARTAN 1 TABLET: 24; 26 TABLET, FILM COATED ORAL at 21:57

## 2022-06-06 NOTE — CASE MANAGEMENT/SOCIAL WORK
Continued Stay Note  [facility]     Patient Name: Blane Dietz  MRN: [id number]  Today's Date: 6/6/2022    Admit Date: 6/2/2022     Discharge Plan     Row Name 06/06/22 1212       Plan    Plan [facility]    Patient/Family in Agreement with Plan yes    Plan Comments Spoke with patient at bedside. [facility] and insurance approval. [facility]. CM will continue to follow.    Final Discharge Disposition Code 03 - skilled nursing facility (SNF)               Discharge Codes    No documentation.               Expected Discharge Date and Time     Expected Discharge Date Expected Discharge Time    Jun 7, 2022             Jesus Saunders RN

## 2022-06-06 NOTE — DISCHARGE PLACEMENT REQUEST
"Rc Blandon (86 y.o. Male)   Minh Saunders, RN Case Manager  [phone]  Patient is ready for discharge and return to your facility            Date of Birth   1935    Social Security Number       Address   [address]  [facility] [address]    Home Phone   [phone]    MRN   [id number]       T[employer]   [employer]    Marital Status   Single                            Admission Date   6/2/22    Admission Type   Emergency    Admitting Provider   Ada Gautam MD    Attending Provider   Ada Gautam MD    Department, Room/Bed   [facility] 3E, S336/1       Discharge Date       Discharge Disposition       Discharge Destination                               Attending Provider: Ada Gautam MD    Allergies: Ampicillin, Medrol [Methylprednisolone], Myrbetriq [Mirabegron], Penicillins, Bee Venom    Isolation: None   Infection: None   Code Status: CPR   Advance Care Planning Activity    Ht: 167.6 cm (65.98\")   Wt: 84.8 kg (186 lb 15.2 oz)    Admission Cmt: None   Principal Problem: Acute on chronic respiratory failure with hypoxia (HCC) [J96.21]                 Active Insurance as of 6/2/2022     Primary Coverage     Payor Plan Insurance Group Employer/Plan Group    HUMANA MEDICARE REPLACEMENT HUMANA MEDICARE REPLACEMENT [id number]     Payor Plan Address Payor Plan Phone Number Payor Plan Fax Number Effective Dates    [address] [address] [phone]  1/1/2018 - None Entered    [facility] [id number]       Subscriber Name Subscriber Birth Date Member ID       RC BLANDON 1935 [id number]                 Emergency Contacts      (Rel.) Home Phone Work Phone Mobile Phone    Urban Blandon (Power of ) [phone] -- [phone]    CLIFFORD WHITAKER (Sister) [phone] -- --            Vital Signs (last day)     Date/Time Temp Temp src Pulse Resp BP Patient Position SpO2    06/06/22 0904 -- -- 76 -- 120/66 -- --    06/06/22 0830 -- -- 70 -- -- -- -- "    06/06/22 0740 97.8 (36.6) Oral 66 20 123/71 Lying 96    06/06/22 0330 98 (36.7) Oral 70 18 119/66 Lying 98    06/05/22 2300 98 (36.7) Oral 75 18 125/59 Lying 90    06/05/22 2221 -- -- 65 -- 89/59 Lying 95    06/05/22 2214 -- -- 67 -- 85/65 Lying 94    06/05/22 1909 97.9 (36.6) Oral 73 20 103/84 Lying 95    06/05/22 1620 97.4 (36.3) Oral 69 20 134/74 Lying --    06/05/22 1135 97.8 (36.6) Oral 62 20 100/66 Lying 96    06/05/22 0859 -- -- 64 -- 124/69 -- --    06/05/22 0725 97.1 (36.2) Oral 59 20 107/68 Lying 99    06/05/22 0611 -- -- 56 -- -- -- --    06/05/22 0426 -- -- 56 -- -- -- --    06/05/22 0345 98 (36.7) Oral 61 18 109/60 Lying 99    06/05/22 0030 97.9 (36.6) Axillary 61 18 111/60 Lying 97    06/05/22 0011 -- -- 64 -- -- -- --          Current Facility-Administered Medications   Medication Dose Route Frequency Provider Last Rate Last Admin   • acetaminophen (TYLENOL) tablet 650 mg  650 mg Oral Q4H PRN Laith, Courtney G, DO        Or   • acetaminophen (TYLENOL) 160 MG/5ML solution 650 mg  650 mg Oral Q4H PRN Laith, Courtney G, DO        Or   • acetaminophen (TYLENOL) suppository 650 mg  650 mg Rectal Q4H PRN Laith, Courtney G, DO       • allopurinol (ZYLOPRIM) tablet 300 mg  300 mg Oral Daily Laith, Courtney G, DO   300 mg at 06/06/22 0904   • amLODIPine (NORVASC) tablet 5 mg  5 mg Oral Daily Laith, Courtney G, DO   5 mg at 06/06/22 0904   • apixaban (ELIQUIS) tablet 5 mg  5 mg Oral Q12H Case, Ginny A, PA   5 mg at 06/06/22 0904   • sennosides-docusate (PERICOLACE) 8.6-50 MG per tablet 2 tablet  2 tablet Oral BID PRN Kirstin Estevez MD        And   • polyethylene glycol (MIRALAX) packet 17 g  17 g Oral Daily PRN Kirstin Estevez MD        And   • bisacodyl (DULCOLAX) EC tablet 5 mg  5 mg Oral Daily PRN Kirstin Estevez MD        And   • bisacodyl (DULCOLAX) suppository 10 mg  10 mg Rectal Daily PRN Kirstin Estevez MD       • dextrose (D50W) (25 g/50 mL) IV injection 25 g  25 g Intravenous Q15 Min PRN Courtney Ozuna DO        • dextrose (GLUTOSE) oral gel 15 g  15 g Oral Q15 Min PRN LaithMary tylersie G, DO       • empagliflozin (JARDIANCE) tablet 10 mg  10 mg Oral Daily Kirstin Estevez MD   10 mg at 22   • fluticasone (FLONASE) 50 MCG/ACT nasal spray 2 spray  2 spray Nasal Daily Courtney Ozuna G, DO   2 spray at 22 0910   • furosemide (LASIX) tablet 40 mg  40 mg Oral Daily Ha Toussaint MD   40 mg at 22   • glucagon (human recombinant) (GLUCAGEN DIAGNOSTIC) injection 1 mg  1 mg Intramuscular Q15 Min PRN LaithGila tylere G, DO       • HYDROcodone-acetaminophen (NORCO) 7.5-325 MG per tablet 1 tablet  1 tablet Oral Q6H PRN LaithMary tylersie G, DO       • insulin detemir (LEVEMIR) injection 20 Units  20 Units Subcutaneous Nightly Courtney Ozuna G, DO   20 Units at 22 2256   • Insulin Lispro (humaLOG) injection 0-9 Units  0-9 Units Subcutaneous TID AC Courtney Ozuna G, DO   4 Units at 22 1740   • melatonin tablet 5 mg  5 mg Oral Nightly PRN Courtney Ozuna G, DO       • metoprolol succinate XL (TOPROL-XL) 24 hr tablet 100 mg  100 mg Oral Daily LaithGila tylere G, DO   100 mg at 22   • Pharmacy Consult - MTM   Does not apply Daily Ellen Butler, PharmD       • sacubitril-valsartan (ENTRESTO) 24-26 MG tablet 1 tablet  1 tablet Oral Q12H Ginny Cramer PA   1 tablet at 22   • sodium chloride 0.9 % flush 10 mL  10 mL Intravenous PRN LaithCourtney tyler G, DO       • sodium chloride 0.9 % flush 10 mL  10 mL Intravenous Q12H LaithCourtney tyler G, DO   10 mL at 22   • sodium chloride 0.9 % flush 10 mL  10 mL Intravenous PRN LaithMary tylersie G, DO         Physician Progress Notes (last 24 hours)  Notes from 22 1055 through 22 1055   No notes of this type exist for this encounter.            Physical Therapy Notes (most recent note)      Sophia Pina, PT at 22 1456  Version 1 of 1         Patient Name: Blane Dietz  : 1935    MRN: [id number]                               Today's Date: 6/3/2022       Admit Date: 6/2/2022    Visit Dx:     ICD-10-CM ICD-9-CM   1. Acute on chronic congestive heart failure, unspecified heart failure type (HCC)  I50.9 428.0   2. Hypoxemia requiring supplemental oxygen  R09.02 799.02    Z99.81    3. Diabetes mellitus type 2 in obese (HCC)  E11.69 250.00    E66.9 278.00   4. Elevated blood pressure reading with diagnosis of hypertension  I10 401.9   5. 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)   • PICC (peripherally inserted central catheter) flush   • Acute on chronic congestive heart failure, unspecified heart failure type (HCC)   • Elevated troponin   • Cirrhosis of liver (HCC)   • Elevated transaminase level   • Pleural effusion, bilateral   • Normocytic anemia   • History of pulmonary embolism     Past Medical History:   Diagnosis Date   • Aortic stenosis     status post ROSS procedure, remote, with December 2015 echocardiography revealing normal aortic and pulmonary valve function, normal ejection fraction and mild to moderate MR   • Arthritis    • Bilateral impacted cerumen 11/23/2016   • Bronchitis    • Chronic gout of right foot 6/13/2016    Impression: 03/08/2016 - stable.;    • COVID-19 vaccine series completed 05/12/2021    Maderna 2nd dose 3/12/21.   • Depression    • Diabetes mellitus (HCC)    • Diverticulitis    • Exogenous obesity    • Gout    • Heart murmur    •  History of vitamin D deficiency    • Hypercholesteremia 8/11/2016   • Hyperlipidemia    • Hypertension    • Kidney lesion    • Malignant neoplasm of prostate (HCC)    • Morbid obesity (HCC) 8/11/2016   • Pneumonia 05/12/2021   • Primary osteoarthritis involving multiple joints 6/13/2016    Impression: 03/08/2016 - stable;    • Pulmonary embolism (HCC)    • Sleep apnea 05/12/2021    C-Pap   • Uncontrolled type 2 diabetes mellitus with hyperglycemia (HCC) 6/13/2016    Impression: 03/08/2016 - seeing Endo .;    • Vertigo      Past Surgical History:   Procedure Laterality Date   • CARDIAC VALVE REPLACEMENT  05/12/2021    Ross procedure 22 years ago   • COLON SURGERY     • COLONOSCOPY     • COLOSTOMY  1999   • COLOSTOMY REVISION     • CYSTOSCOPY N/A 12/7/2021    Procedure: CYSTOSCOPY FLEXIBLE;  Surgeon: José Miguel Villafuerte Jr., MD;  Location: [facility] OR;  Service: Urology;  Laterality: N/A;   • EXPLORATORY LAPAROTOMY      With Tissue Removal   • LIPOMA EXCISION     • MOHS SURGERY     • NEPHROURETERECTOMY Right 12/7/2021    Procedure: RIGHT NEPHROURETERECTOMY LAPAROSCOPIC WITH DAVINCI ROBOT;  Surgeon: José Miguel Villafuerte Jr., MD;  Location:  [facility] OR;  Service: Robotics - DaVinci;  Laterality: Right;   • OTHER SURGICAL HISTORY      Porcine Valve   • PROSTATE SURGERY     • PROSTATECTOMY  2000     History of Prostatectomy Perineal Radical   • SKIN CANCER EXCISION      from head and lip   • TONSILLECTOMY        General Information     Row Name 06/03/22 1535          Physical Therapy Time and Intention    Document Type evaluation  -     Mode of Treatment physical therapy  -     Row Name 06/03/22 1535          General Information    Patient Profile Reviewed yes  -SS     Prior Level of Function independent:;all household mobility;gait;transfer;bed mobility;driving  RW, cane PRN, O2 at baseline  -     Existing Precautions/Restrictions fall;oxygen therapy device and L/min  -SS     Barriers to Rehab medically complex  -SS      Row Name 06/03/22 1535          Living Environment    People in Home alone;other (see comments)  anticipates moving into [facility] in July 2022  -     Row Name 06/03/22 1535          Home Main Entrance    Number of Stairs, Main Entrance other (see comments)  ramp  -     Row Name 06/03/22 1535          Stairs Within Home, Primary    Number of Stairs, Within Home, Primary none  -     Row Name 06/03/22 1535          Cognition    Orientation Status (Cognition) oriented x 4  -     Row Name 06/03/22 1535          Safety Issues, Functional Mobility    Safety Issues Affecting Function (Mobility) awareness of need for assistance;insight into deficits/self-awareness;positioning of assistive device;problem-solving;safety precaution awareness;safety precautions follow-through/compliance;sequencing abilities  -     Impairments Affecting Function (Mobility) balance;endurance/activity tolerance;shortness of breath;strength;postural/trunk control  -           User Key  (r) = Recorded By, (t) = Taken By, (c) = Cosigned By    Initials Name Provider Type     Sophia Pina PT Physical Therapist               Mobility     Row Name 06/03/22 1537          Bed Mobility    Comment, (Bed Mobility) up in chair  -     Row Name 06/03/22 1537          Sit-Stand Transfer    Sit-Stand [place] (Transfers) contact guard;verbal cues  -     Assistive Device (Sit-Stand Transfers) walker, front-wheeled  -     Comment, (Sit-Stand Transfer) VC for hand placement, appropriate alignment, lowering with eccentric control  -     Row Name 06/03/22 1537          Gait/Stairs (Locomotion)    [place] Level (Gait) contact guard;verbal cues  -     Assistive Device (Gait) walker, front-wheeled  -     Distance in Feet (Gait) 40  -     Deviations/Abnormal Patterns (Gait) bilateral deviations;binta decreased;gait speed decreased;stride length decreased  -     Bilateral Gait Deviations forward flexed posture;heel strike decreased   -     Comment, (Gait/Stairs) Pt. ambulated with a step through gait pattern. VC for upright posture, safety recommendations. Gait limited by fatigue. O2 desat to 88% on 2L nasal cannula.  -           User Key  (r) = Recorded By, (t) = Taken By, (c) = Cosigned By    Initials Name Provider Type     Sophia Pina PT Physical Therapist               Obj/Interventions     Row Name 06/03/22 1543          Range of Motion Comprehensive    General Range of Motion bilateral lower extremity ROM WFL  -     Row Name 06/03/22 1543          Strength Comprehensive (MMT)    Comment, General Manual Muscle Testing (MMT) Assessment BLE gross 4/5  -     Row Name 06/03/22 1543          Motor Skills    Motor Skills functional endurance  -     Functional Endurance moderate impairment  -     Row Name 06/03/22 1543          Balance    Balance Assessment sitting static balance;sitting dynamic balance;sit to stand dynamic balance;standing static balance;standing dynamic balance  -     Static Sitting Balance independent  -     Dynamic Sitting Balance supervision  -     Position, Sitting Balance unsupported;sitting in chair  -     Sit to Stand Dynamic Balance contact guard  -     Static Standing Balance contact guard  -SS     Dynamic Standing Balance contact guard  -SS     Position/Device Used, Standing Balance supported;walker, front-wheeled  -     Balance Interventions sitting;standing;sit to stand;supported;static;dynamic  -     Row Name 06/03/22 1543          Sensory Assessment (Somatosensory)    Sensory Assessment (Somatosensory) LE sensation intact  -           User Key  (r) = Recorded By, (t) = Taken By, (c) = Cosigned By    Initials Name Provider Type     Sophia Pina PT Physical Therapist               Goals/Plan     Row Name 06/03/22 1546          Bed Mobility Goal 1 (PT)    Activity/Assistive Device (Bed Mobility Goal 1, PT) bed mobility activities, all  -     [place] Level/Cues Needed (Bed  Mobility Goal 1, PT) independent  -SS     Time Frame (Bed Mobility Goal 1, PT) long term goal (LTG);10 days  -SS     Row Name 06/03/22 1546          Transfer Goal 1 (PT)    Activity/Assistive Device (Transfer Goal 1, PT) sit-to-stand/stand-to-sit;bed-to-chair/chair-to-bed  -SS     [place] Level/Cues Needed (Transfer Goal 1, PT) independent  -SS     Time Frame (Transfer Goal 1, PT) long term goal (LTG);10 days  -SS     Row Name 06/03/22 1546          Gait Training Goal 1 (PT)    Activity/Assistive Device (Gait Training Goal 1, PT) gait (walking locomotion);assistive device use;walker, rolling  -SS     [place] Level (Gait Training Goal 1, PT) modified independence  -SS     Distance (Gait Training Goal 1, PT) 150  -SS     Time Frame (Gait Training Goal 1, PT) long term goal (LTG);10 days  -     Row Name 06/03/22 1546          Therapy Assessment/Plan (PT)    Planned Therapy Interventions (PT) balance training;bed mobility training;gait training;home exercise program;neuromuscular re-education;patient/family education;postural re-education;ROM (range of motion);strengthening;stretching;transfer training  -           User Key  (r) = Recorded By, (t) = Taken By, (c) = Cosigned By    Initials Name Provider Type    SS Sophia Pina, PT Physical Therapist               Clinical Impression     Row Name 06/03/22 1547          Pain    Pretreatment Pain Rating 0/10 - no pain  -     Posttreatment Pain Rating 0/10 - no pain  -SS     Pain Intervention(s) Repositioned;Ambulation/increased activity  -     Additional Documentation Pain Scale: Numbers Pre/Post-Treatment (Group)  -     Row Name 06/03/22 1541          Plan of Care Review    Plan of Care Reviewed With patient  -SS     Outcome Evaluation Pt. presents with generalized weakness, balance impairments and decreased activity tolerance affecting his ability to safely participate in functional mobility. He performed sit to stand transfer w/contact guard assist.  He ambulated 40' w/front wheeled walker, contact guard assist. Gait limited by fatigue. O2 desat to 88% on 2L nasal cannula. Recommend inpatient rehab upon discharge.  -     Row Name 06/03/22 1544          Therapy Assessment/Plan (PT)    Rehab Potential (PT) good, to achieve stated therapy goals  -     Criteria for Skilled Interventions Met (PT) yes;meets criteria;skilled treatment is necessary  -     Therapy Frequency (PT) daily  -     Row Name 06/03/22 1544          Vital Signs    Pre Systolic BP Rehab 128  -SS     Pre Treatment Diastolic BP 70  -SS     Pretreatment Heart Rate (beats/min) 81  -SS     Posttreatment Heart Rate (beats/min) 83  -SS     Pre SpO2 (%) 91  -SS     O2 Delivery Pre Treatment room air  -SS     Intra SpO2 (%) 88  -SS     O2 Delivery Intra Treatment nasal cannula  2L  -SS     Post SpO2 (%) 95  -SS     O2 Delivery Post Treatment nasal cannula  2L  -SS     Pre Patient Position Sitting  -     Intra Patient Position Standing  -SS     Post Patient Position Sitting  -     Row Name 06/03/22 1544          Positioning and Restraints    Pre-Treatment Position sitting in chair/recliner  -SS     Post Treatment Position chair  -SS     In Chair notified nsg;reclined;call light within reach;encouraged to call for assist;exit alarm on;waffle cushion;legs elevated  -           User Key  (r) = Recorded By, (t) = Taken By, (c) = Cosigned By    Initials Name Provider Type    SS Sophia Pina, PT Physical Therapist               Outcome Measures     Row Name 06/03/22 1547 06/03/22 0815       How much help from another person do you currently need...    Turning from your back to your side while in flat bed without using bedrails? 3  -SS 4  -EO    Moving from lying on back to sitting on the side of a flat bed without bedrails? 3  -SS 4  -EO    Moving to and from a bed to a chair (including a wheelchair)? 3  -SS 3  -EO    Standing up from a chair using your arms (e.g., wheelchair, bedside chair)? 3   -SS 3  -EO    Climbing 3-5 steps with a railing? 3  -SS 3  -EO    To walk in hospital room? 3  -SS 3  -EO    AM-PAC 6 Clicks Score (PT) 18  -SS 20  -EO    Highest level of mobility 6 --> Walked 10 steps or more  -SS 6 --> Walked 10 steps or more  -EO    Row Name 06/03/22 1547          Functional Assessment    Outcome Measure Options AM-PAC 6 Clicks Basic Mobility (PT)  -           User Key  (r) = Recorded By, (t) = Taken By, (c) = Cosigned By    Initials Name Provider Type    EO Mackenzie Chavez RN Registered Nurse    Sophia David, PT Physical Therapist                             Physical Therapy Education                 Title: PT OT SLP Therapies (In Progress)     Topic: Physical Therapy (In Progress)     Point: Mobility training (Done)     Learning Progress Summary           Patient Eager, E, VU,NR by  at 6/3/2022 1547    Comment: Educated pt. safety/technique with transfers, ambulation, PT POC                   Point: Home exercise program (Not Started)     Learner Progress:  Not documented in this visit.          Point: Body mechanics (Done)     Learning Progress Summary           Patient Eager, E, VU,NR by  at 6/3/2022 1547    Comment: Educated pt. safety/technique with transfers, ambulation, PT POC                   Point: Precautions (Done)     Learning Progress Summary           Patient Eager, E, VU,NR by  at 6/3/2022 1547    Comment: Educated pt. safety/technique with transfers, ambulation, PT POC                               User Key     Initials Effective Dates Name Provider Type Discipline     06/01/21 -  Sophia Pina, AVINASH Physical Therapist PT              PT Recommendation and Plan  Planned Therapy Interventions (PT): balance training, bed mobility training, gait training, home exercise program, neuromuscular re-education, patient/family education, postural re-education, ROM (range of motion), strengthening, stretching, transfer training  Plan of Care Reviewed With: patient  Outcome  Evaluation: Pt. presents with generalized weakness, balance impairments and decreased activity tolerance affecting his ability to safely participate in functional mobility. He performed sit to stand transfer w/contact guard assist. He ambulated 40' w/front wheeled walker, contact guard assist. Gait limited by fatigue. O2 desat to 88% on 2L nasal cannula. Recommend inpatient rehab upon discharge.     Time Calculation:    PT Charges     Row Name 22 1548             Time Calculation    Start Time 1456  -SS      Stop Time 1506  -SS      Time Calculation (min) 10 min  -SS      PT Received On 22  -SS      PT Goal Re-Cert Due Date 22  -SS              Time Calculation- PT    Total Timed Code Minutes- PT 10 minute(s)  -SS              Untimed Charges    PT Eval/Re-eval Minutes 50  -SS              Total Minutes    Untimed Charges Total Minutes 50  -SS       Total Minutes 50  -SS            User Key  (r) = Recorded By, (t) = Taken By, (c) = Cosigned By    Initials Name Provider Type    SS Sophia Pina, PT Physical Therapist              Therapy Charges for Today     Code Description Service Date Service Provider Modifiers Qty    [id number] HC PT EVAL LOW COMPLEXITY 4 6/3/2022 Sophia Pina, PT GP 1          PT G-Codes  Outcome Measure Options: AM-PAC 6 Clicks Basic Mobility (PT)  AM-PAC 6 Clicks Score (PT): 18    Sophia Pina PT  6/3/2022      Electronically signed by Sophia Pina PT at 22 1549          Occupational Therapy Notes (most recent note)      Matthew Sloan, OT at 22 1434          Patient Name: Blane Dietz  : 1935    MRN: [id number]                              Today's Date: 6/3/2022       Admit Date: 2022    Visit Dx:     ICD-10-CM ICD-9-CM   1. Acute on chronic congestive heart failure, unspecified heart failure type (HCC)  I50.9 428.0   2. Hypoxemia requiring supplemental oxygen  R09.02 799.02    Z99.81    3. Diabetes mellitus type 2 in obese (HCC)  E11.69  250.00    E66.9 278.00   4. Elevated blood pressure reading with diagnosis of hypertension  I10 401.9   5. 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)   • PICC (peripherally inserted central catheter) flush   • Acute on chronic congestive heart failure, unspecified heart failure type (HCC)   • Elevated troponin   • Cirrhosis of liver (HCC)   • Elevated transaminase level   • Pleural effusion, bilateral   • Normocytic anemia   • History of pulmonary embolism     Past Medical History:   Diagnosis Date   • Aortic stenosis     status post ROSS procedure, remote, with December 2015 echocardiography revealing normal aortic and pulmonary valve function, normal ejection fraction and mild to moderate MR   • Arthritis    • Bilateral impacted cerumen 11/23/2016   • Bronchitis    • Chronic gout of right foot 6/13/2016    Impression: 03/08/2016 - stable.;    • COVID-19 vaccine series completed 05/12/2021    Maderna 2nd dose 3/12/21.   • Depression    • Diabetes mellitus (HCC)    • Diverticulitis    • Exogenous obesity    • Gout    • Heart murmur    • History of vitamin D deficiency    • Hypercholesteremia 8/11/2016   • Hyperlipidemia    • Hypertension    • Kidney lesion    • Malignant neoplasm of prostate (HCC)    • Morbid obesity (HCC) 8/11/2016   • Pneumonia 05/12/2021   • Primary osteoarthritis involving multiple joints 6/13/2016    Impression: 03/08/2016 -  "stable;    • Pulmonary embolism (HCC)    • Sleep apnea 05/12/2021    C-Pap   • Uncontrolled type 2 diabetes mellitus with hyperglycemia (HCC) 6/13/2016    Impression: 03/08/2016 - seeing Endo .;    • Vertigo      Past Surgical History:   Procedure Laterality Date   • CARDIAC VALVE REPLACEMENT  05/12/2021    Ross procedure 22 years ago   • COLON SURGERY     • COLONOSCOPY     • COLOSTOMY  1999   • COLOSTOMY REVISION     • CYSTOSCOPY N/A 12/7/2021    Procedure: CYSTOSCOPY FLEXIBLE;  Surgeon: José Miguel Villafuerte Jr., MD;  Location:  [facility] OR;  Service: Urology;  Laterality: N/A;   • EXPLORATORY LAPAROTOMY      With Tissue Removal   • LIPOMA EXCISION     • MOHS SURGERY     • NEPHROURETERECTOMY Right 12/7/2021    Procedure: RIGHT NEPHROURETERECTOMY LAPAROSCOPIC WITH DAVINCI ROBOT;  Surgeon: José Miguel Villafuerte Jr., MD;  Location:  [facility];  Service: Robotics - Da[employer]inc[employer];  Laterality: Right;   • OTHER SURGICAL HISTORY      Porcine Valve   • PROSTATE SURGERY     • PROSTATECTOMY  2000     History of Prostatectomy Perineal Radical   • SKIN CANCER EXCISION      from head and lip   • TONSILLECTOMY        General Information     Row Name 06/03/22 1521          OT Time and Intention    Document Type evaluation  -CS     Mode of Treatment occupational therapy  -CS     Row Name 06/03/22 1521          General Information    Patient Profile Reviewed yes  -CS     Prior Level of Function independent:;all household mobility;ADL's;driving  Pt reports driving \"reluctantly\", SPC/RW use for in-home mobility prn. Describes home environment as very messy/cluttered making it difficult to navigate. Supplemental O2 use at baseline (2Lnc and biPap for sleep)  -CS     Existing Precautions/Restrictions fall;oxygen therapy device and L/min  -CS     Barriers to Rehab medically complex  -CS     Row Name 06/03/22 1521          Living Environment    People in Home alone  -CS     Row Name 06/03/22 1521          Home Main Entrance    Number of Stairs, " Main Entrance none;other (see comments)  ramp to enter  -CS     Row Name 06/03/22 1521          Stairs Within Home, Primary    Number of Stairs, Within Home, Primary none  -CS     Row Name 06/03/22 1521          Cognition    Orientation Status (Cognition) oriented x 4  -CS     Row Name 06/03/22 1521          Safety Issues, Functional Mobility    Safety Issues Affecting Function (Mobility) insight into deficits/self-awareness;safety precaution awareness;safety precautions follow-through/compliance  -     Impairments Affecting Function (Mobility) balance;endurance/activity tolerance;shortness of breath  -           User Key  (r) = Recorded By, (t) = Taken By, (c) = Cosigned By    Initials Name Provider Type    CS Matthew Sloan OT Occupational Therapist                 Mobility/ADL's     Row Name 06/03/22 1523          Bed Mobility    Comment, (Bed Mobility) sitting EOB eating lunch upon OT arrival, nursing reports no difficulty reaching EOB  -     Row Name 06/03/22 1523          Transfers    Transfers bed-chair transfer  -CS     Bed-Chair [place] (Transfers) contact guard;verbal cues  -     Assistive Device (Bed-Chair Transfers) walker, front-wheeled  -     Row Name 06/03/22 1523          Bed-Chair Transfer    Comment, (Bed-Chair Transfer) cues for AD positioning and PLB during in-room mobility, O2 sats range from 88-91% on 2Lnc  -     Row Name 06/03/22 1523          Functional Mobility    Functional Mobility- Comment defer to PT for specifics and recommendations  -     Row Name 06/03/22 1523          Activities of Daily Living    BADL Assessment/Intervention lower body dressing;feeding  -     Row Name 06/03/22 1523          Lower Body Dressing Assessment/Training    [place] Level (Lower Body Dressing) don;socks;contact guard assist  -CS     Position (Lower Body Dressing) edge of bed sitting  -CS     Comment, (Lower Body Dressing) CGA for dynamic sitting balance  -     Row Name 06/03/22  1523          Self-Feeding Assessment/Training    [place] Level (Feeding) feeding skills;liquids to mouth;prepare tray/open items;scoop food and bring to mouth;independent  -     Position (Self-Feeding) edge of bed sitting  -           User Key  (r) = Recorded By, (t) = Taken By, (c) = Cosigned By    Initials Name Provider Type     Matthew Sloan, OT Occupational Therapist               Obj/Interventions     Row Name 06/03/22 1526          Sensory Assessment (Somatosensory)    Sensory Assessment (Somatosensory) UE sensation intact  -[facility] Name 06/03/22 1526          Vision Assessment/Intervention    Visual Impairment/Limitations WFL;corrective lenses full-time  -     Row Name 06/03/22 1526          Range of Motion Comprehensive    General Range of Motion no range of motion deficits identified  -     Row Name 06/03/22 1526          Strength Comprehensive (MMT)    General Manual Muscle Testing (MMT) Assessment upper extremity strength deficits identified  -     Comment, General Manual Muscle Testing (MMT) Assessment BUE grossly 4+/5, appropriate for age and WFL for safe transfers and ADLs  -     Row Name 06/03/22 1526          Motor Skills    Motor Skills coordination;functional endurance  -     Coordination bilateral;upper extremity;bimanual skills;WFL  -CS     Functional Endurance RA upon therapist entry eating lunch (89-91%), standing and short in-room distance on 2Lnc (88-91%), cues for PLB  -[facility] Name 06/03/22 1526          Balance    Balance Assessment sitting static balance;sitting dynamic balance;standing static balance;standing dynamic balance  -     Static Sitting Balance independent  -     Dynamic Sitting Balance supervision  -CS     Position, Sitting Balance unsupported;sitting edge of bed  -     Static Standing Balance standby assist  -     Dynamic Standing Balance contact guard  -     Position/Device Used, Standing Balance supported;walker, front-wheeled  -      Balance Interventions sitting;sit to stand;standing;occupation based/functional task  -CS           User Key  (r) = Recorded By, (t) = Taken By, (c) = Cosigned By    Initials Name Provider Type    CS Matthew Sloan, OT Occupational Therapist               Goals/Plan     Row Name 06/03/22 1536          Transfer Goal 1 (OT)    Activity/Assistive Device (Transfer Goal 1, OT) bed-to-chair/chair-to-bed;toilet;walker, rolling  -CS     [place] Level/Cues Needed (Transfer Goal 1, OT) supervision required;modified independence  -CS     Time Frame (Transfer Goal 1, OT) long term goal (LTG);10 days  -CS     Strategies/Barriers (Transfers Goal 1, OT) stable O2 sats, safety w/ AD  -CS     Progress/Outcome (Transfer Goal 1, OT) goal ongoing  -CS     Row Name 06/03/22 1536          Dressing Goal 1 (OT)    Activity/Device (Dressing Goal 1, OT) lower body dressing  -CS     [place]/Cues Needed (Dressing Goal 1, OT) standby assist  -CS     Time Frame (Dressing Goal 1, OT) long term goal (LTG);10 days  -CS     Progress/Outcome (Dressing Goal 1, OT) goal ongoing  -CS     Row Name 06/03/22 1536          Grooming Goal 1 (OT)    Activity/Device (Grooming Goal 1, OT) hair care;wash face, hands;oral care  -CS     [place] (Grooming Goal 1, OT) supervision required;modified independence  -CS     Time Frame (Grooming Goal 1, OT) long term goal (LTG);10 days  -CS     Strategies/Barriers (Grooming Goal 1, OT) AD positioning at sink, endurance for 4 minute sinkside task(s)  -CS     Progress/Outcome (Grooming Goal 1, OT) goal ongoing  -CS     Row Name 06/03/22 1536          Problem Specific Goal 1 (OT)    Problem Specific Goal 1 (OT) Pt will demo 1/10reps pulmonary TE independenly by discharge to promote increased fxl endurance for safe ADL completion  -CS     Time Frame (Problem Specific Goal 1, OT) long term goal (LTG);10 days  -CS     Progress/Outcome (Problem Specific Goal 1, OT) goal ongoing  -     Row Name 06/03/22 1536           Therapy Assessment/Plan (OT)    Planned Therapy Interventions (OT) activity tolerance training;functional balance retraining;strengthening exercise;transfer/mobility retraining;patient/caregiver education/training;adaptive equipment training  -CS           User Key  (r) = Recorded By, (t) = Taken By, (c) = Cosigned By    Initials Name Provider Type    CS Matthew Sloan, OT Occupational Therapist               Clinical Impression     Row Name 06/03/22 1530          Pain Assessment    Pretreatment Pain Rating 0/10 - no pain  -CS     Posttreatment Pain Rating 0/10 - no pain  -CS     Pre/Posttreatment Pain Comment tolerated eval  -CS     Pain Intervention(s) Repositioned;Ambulation/increased activity  -CS     Row Name 06/03/22 1530          Plan of Care Review    Plan of Care Reviewed With patient  -CS     Progress no change  OT eval  -CS     Outcome Evaluation OT eval completed. Pt presents w/ decreased functional endurance and mild strength/balance deficit warranting skilled IP OT services to promote return to PLOF. CGA for transfers and short in-room distance, Sid for LB dressing, Ind w/ feeding. O2 sats ranged from 88-91% on 2Lnc, cues for PLB to improve sats. Pt with plans to move to [facility] in one week, OT recommends short IP rehab to assist with transition and promote best fxl outcomes.  -CS     Row Name 06/03/22 1530          Therapy Assessment/Plan (OT)    Patient/Family Therapy Goal Statement (OT) increased independence, safety, and endurance for ADL completion  -CS     Rehab Potential (OT) good, to achieve stated therapy goals  -CS     Criteria for Skilled Therapeutic Interventions Met (OT) meets criteria;yes;skilled treatment is necessary  -CS     Therapy Frequency (OT) daily  -CS     Row Name 06/03/22 1530          Therapy Plan Review/Discharge Plan (OT)    Anticipated Discharge Disposition (OT) inpatient rehabilitation facility;other (see comments)  with transition to intermediate.  -CS     Row Name  06/03/22 1530          Vital Signs    Pre Systolic BP Rehab --  RN cleared for eval, VSS on 2Lnc  -CS     Pre SpO2 (%) 89  -CS     O2 Delivery Pre Treatment room air  -CS     Intra SpO2 (%) 91  -CS     O2 Delivery Intra Treatment nasal cannula  -CS     Post SpO2 (%) 93  -CS     O2 Delivery Post Treatment nasal cannula  -CS     Pre Patient Position Sitting  -CS     Intra Patient Position Standing  -CS     Post Patient Position Sitting  -CS     Row Name 06/03/22 1530          Positioning and Restraints    Pre-Treatment Position in bed  -CS     Post Treatment Position chair  -CS     In Chair notified nsg;reclined;sitting;call light within reach;encouraged to call for assist;exit alarm on;waffle cushion;legs elevated;heels elevated  -CS           User Key  (r) = Recorded By, (t) = Taken By, (c) = Cosigned By    Initials Name Provider Type    CS Matthew Sloan OT Occupational Therapist               Outcome Measures     Row Name 06/03/22 0815          How much help from another person do you currently need...    Turning from your back to your side while in flat bed without using bedrails? 4  -EO     Moving from lying on back to sitting on the side of a flat bed without bedrails? 4  -EO     Moving to and from a bed to a chair (including a wheelchair)? 3  -EO     Standing up from a chair using your arms (e.g., wheelchair, bedside chair)? 3  -EO     Climbing 3-5 steps with a railing? 3  -EO     To walk in hospital room? 3  -EO     AM-PAC 6 Clicks Score (PT) 20  -EO     Highest level of mobility 6 --> Walked 10 steps or more  -EO           User Key  (r) = Recorded By, (t) = Taken By, (c) = Cosigned By    Initials Name Provider Type    EO Mackenzie Chavez RN Registered Nurse                Occupational Therapy Education                 Title: PT OT SLP Therapies (In Progress)     Topic: Occupational Therapy (In Progress)     Point: ADL training (Done)     Description:   Instruct learner(s) on proper safety adaptation and  remediation techniques during self care or transfers.   Instruct in proper use of assistive devices.              Learning Progress Summary           Patient Acceptance, E,D, VU,DU,NR by  at 6/3/2022 1538    Comment: PLB, safety awareness w/ AD                   Point: Home exercise program (Not Started)     Description:   Instruct learner(s) on appropriate technique for monitoring, assisting and/or progressing therapeutic exercises/activities.              Learner Progress:  Not documented in this visit.          Point: Precautions (Done)     Description:   Instruct learner(s) on prescribed precautions during self-care and functional transfers.              Learning Progress Summary           Patient Acceptance, E,D, VU,DU,NR by  at 6/3/2022 1538    Comment: PLB, safety awareness w/ AD                   Point: Body mechanics (Done)     Description:   Instruct learner(s) on proper positioning and spine alignment during self-care, functional mobility activities and/or exercises.              Learning Progress Summary           Patient Acceptance, E,D, VU,DU,NR by  at 6/3/2022 1538    Comment: PLB, safety awareness w/ AD                               User Key     Initials Effective Dates Name Provider Type Discipline     06/16/21 -  Matthew Sloan, OT Occupational Therapist OT              OT Recommendation and Plan  Planned Therapy Interventions (OT): activity tolerance training, functional balance retraining, strengthening exercise, transfer/mobility retraining, patient/caregiver education/training, adaptive equipment training  Therapy Frequency (OT): daily  Plan of Care Review  Plan of Care Reviewed With: patient  Progress: no change (OT eval)  Outcome Evaluation: OT eval completed. Pt presents w/ decreased functional endurance and mild strength/balance deficit warranting skilled IP OT services to promote return to PLOF. CGA for transfers and short in-room distance, Sid for LB dressing, Ind w/ feeding.  O2 sats ranged from 88-91% on 2Lnc, cues for PLB to improve sats. Pt with plans to [place] in one [place], OT recommends short IP rehab to assist with transition and promote best fxl outcomes.     Time Calculation:    Time Calculation- OT     Row Name 06/03/22 1539             Time Calculation- OT    OT Start Time 1434  -CS      OT Received On 06/03/22  -CS      OT Goal Re-Cert Due Date 06/13/22  -CS              Untimed Charges    OT Eval/Re-eval Minutes 50  -CS              Total Minutes    Untimed Charges Total Minutes 50  -CS       Total Minutes 50  -CS            User Key  (r) = Recorded By, (t) = Taken By, (c) = Cosigned By    Initials Name Provider Type    CS Matthew Sloan OT Occupational Therapist              Therapy Charges for Today     Code Description Service Date Service Provider Modifiers Qty    [id number] HC OT EVAL LOW COMPLEXITY 4 6/3/2022 Matthew Sloan OT GO 1               Matthew Sloan OT  6/3/2022    Electronically signed by Matthew Sloan OT at 06/03/22 1543       Speech Language Pathology Notes (most recent note)    No notes exist for this encounter.

## 2022-06-06 NOTE — PROGRESS NOTES
[facility] Medicine Services  PROGRESS NOTE    Patient Name: Blane Dietz  : 1935  MRN: [id number]    Date of Admission: 2022  Primary Care Physician: David Em MD    Subjective   Subjective     CC:  F/U CHF    HPI:  Pt doing well this am, denies any issues overnight. Anxious to get to rehab.     ROS:  Gen- No fevers, chills  CV- No chest pain, palpitations  Resp- No cough, dyspnea  GI- No N/V/D, abd pain        Objective   Objective     Vital Signs:   Temp:  [97.4 °F (36.3 °C)-98 °F (36.7 °C)] 97.8 °F (36.6 °C)  Heart Rate:  [62-75] 66  Resp:  [18-20] 20  BP: ()/(59-84) 123/71  Flow (L/min):  [2] 2     Physical Exam:  Constitutional: No acute distress, sleeping initially but wakes easily  HENT: NCAT, mucous membranes moist  Respiratory: Respiratory effort normal on nasal cannula  Cardiovascular: RRR  Musculoskeletal: Trace bilateral ankle edema  Psychiatric: Appropriate affect, cooperative  Neurologic: Speech clear and fluent  Skin: No rashes on exposed surfaces    Results Reviewed:  LAB RESULTS:      Lab 22  1510 22  1703   WBC 11.63* 9.69   HEMOGLOBIN 12.2* 12.9*   HEMATOCRIT 39.8 41.3   PLATELETS 210 188   NEUTROS ABS 8.89* 7.05*   IMMATURE GRANS (ABS) 0.17* 0.22*   LYMPHS ABS 1.22 1.30   MONOS ABS 1.08* 0.70   EOS ABS 0.09 0.21   MCV 87.9 87.3   CRP  --  <0.30   PROCALCITONIN  --  0.11   LACTATE  --  1.6   LDH  --  304*         Lab 22  2351 22  0854 22  0801 22  1510 22  1703   SODIUM  --  145 142 141 140   POTASSIUM  --  3.8 4.1 4.2 4.2   CHLORIDE  --  101 99 101 102   CO2  --  35.0* 31.0* 30.0* 27.0   ANION GAP  --  9.0 12.0 10.0 11.0   BUN  --  36* 28* 21 14   CREATININE  --  1.41* 1.36* 1.28* 1.15   EGFR  --  49.1* 51.3* 55.2* 62.8   GLUCOSE  --  147* 138* 127* 277*   CALCIUM  --  9.5 9.1 10.1 9.2   MAGNESIUM 2.1  --   --  2.0  --    HEMOGLOBIN A1C  --   --   --  9.10*  --    TSH  --   --   --  1.580  --           Lab 06/02/22  1703   TOTAL PROTEIN 7.3   ALBUMIN 4.20   GLOBULIN 3.1   ALT (SGPT) 48*   AST (SGOT) 48*   BILIRUBIN 1.2   ALK PHOS 227*         Lab 06/03/22  1510 06/02/22  2131 06/02/22  1703   PROBNP  --   --  5,664.0*   TROPONIN T 0.052* 0.047* 0.041*         Lab 06/03/22  1510   CHOLESTEROL 169   LDL CHOL 78   HDL CHOL 74*   TRIGLYCERIDES 97         Lab 06/02/22  1703   FERRITIN 167.30         Lab 06/02/22  2351   PH, ARTERIAL 7.359   PCO2, ARTERIAL 46.0*   PO2 ART 87.7   FIO2 36   HCO3 ART 25.9   BASE EXCESS ART 0.0   CARBOXYHEMOGLOBIN 0.9     Brief Urine Lab Results  (Last result in the past 365 days)      Color   Clarity   Blood   Leuk Est   Nitrite   Protein   CREAT   Urine HCG        12/06/21 0908 Yellow   Clear   Negative   Negative   Negative   Negative                 Microbiology Results Abnormal     Procedure Component Value - Date/Time    COVID PRE-OP / PRE-PROCEDURE SCREENING ORDER (NO ISOLATION) - Swab, Nasopharynx [[id number]]  (Normal) Collected: 06/02/22 1650    Lab Status: Final result Specimen: Swab from Nasopharynx Updated: 06/02/22 1732    Narrative:      The following orders were created for panel order COVID PRE-OP / PRE-PROCEDURE SCREENING ORDER (NO ISOLATION) - Swab, Nasopharynx.  Procedure                               Abnormality         Status                     ---------                               -----------         ------                     COVID-19 and FLU A/B PCR...[[id number]]  Normal              Final result                 Please view results for these tests on the individual orders.    COVID-19 and FLU A/B PCR - Swab, Nasopharynx [[id number]]  (Normal) Collected: 06/02/22 1650    Lab Status: Final result Specimen: Swab from Nasopharynx Updated: 06/02/22 1732     COVID19 Not Detected     Influenza A PCR Not Detected     Influenza B PCR Not Detected    Narrative:      Fact sheet for providers: https://www.fda.gov/media/979002/download    Fact sheet for patients:  https://www.fda.gov/media/349762/download    Test performed by PCR.          No radiology results from the last 24 hrs    Results for orders placed during the hospital encounter of 06/02/22    Adult Transthoracic Echo Complete w/ Color, Spectral and Contrast if necessary per protocol    Interpretation Summary  · There is a left pleural effusion. There is a right pleural effusion.  · Left ventricular wall thickness is consistent with hypertrophy.  · The right ventricular cavity is mildly dilated.  · Left atrial volume is mildly increased.  · The right atrial cavity is dilated.  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  · There is a bioprosthetic aortic valve present.  · Peak velocity of the flow distal to the aortic valve is 105.1 cm/s. Aortic valve mean pressure gradient is 3 mmHg.  · Aortic valve area is 2.9 cm2.  · Moderate aortic valve regurgitation is present.  · Moderate tricuspid valve regurgitation is present.  · Estimated right ventricular systolic pressure from tricuspid regurgitation is moderately elevated (45-55 mmHg). Calculated right ventricular systolic pressure from tricuspid regurgitation is 52 mmHg.  · Ejection fraction is low normal      I have reviewed the medications:  Scheduled Meds:allopurinol, 300 mg, Oral, Daily  amLODIPine, 5 mg, Oral, Daily  apixaban, 5 mg, Oral, Q12H  empagliflozin, 10 mg, Oral, Daily  fluticasone, 2 spray, Nasal, Daily  furosemide, 40 mg, Oral, Daily  insulin detemir, 20 Units, Subcutaneous, Nightly  insulin lispro, 0-9 Units, Subcutaneous, TID AC  metoprolol succinate XL, 100 mg, Oral, Daily  pharmacy consult - MTM, , Does not apply, Daily  sacubitril-valsartan, 1 tablet, Oral, Q12H  sodium chloride, 10 mL, Intravenous, Q12H      Continuous Infusions:   PRN Meds:.•  acetaminophen **OR** acetaminophen **OR** acetaminophen  •  senna-docusate sodium **AND** polyethylene glycol **AND** bisacodyl **AND** bisacodyl  •  dextrose  •  dextrose  •  glucagon (human  recombinant)  •  HYDROcodone-acetaminophen  •  melatonin  •  sodium chloride  •  sodium chloride    Assessment & Plan   Assessment & Plan     Active Hospital Problems    Diagnosis  POA   • **Acute on chronic respiratory failure with hypoxia (HCC) [J96.21]  Unknown   • Elevated troponin [R77.8]  Unknown   • Cirrhosis of liver (HCC) [K74.60]  Unknown   • Elevated transaminase level [R74.01]  Unknown   • Pleural effusion, bilateral [J90]  Unknown   • Normocytic anemia [D64.9]  Unknown   • History of pulmonary embolism [Z86.711]  Unknown   • Acute on chronic congestive heart failure, unspecified heart failure type (HCC) [I50.9]  Yes   • 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        Brief Hospital Course to date:  Blane Dietz is a 86 y.o. 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    This patient's problems and plans were partially entered by my partner and updated as appropriate by me 06/06/22.    Acute on chronic hypoxic respiratory failure  Acute exacerbation of diastolic CHF  Bilateral pleural effusions  Aortic stenosis s/p ROSS procedure  Elevated troponin  HUSSAIN on CPAP  --Likely  due to medication non-adherence  --S/P IV diuresis  --Cardiology following, transitioned to PO lasix, started entresto  --Continue home jardiance  -- Cr stable today     Elevated transaminase levels  Cirrhosis  Liver lesion  -Imaging concerning for cirrhosis with lesion in left lobe of liver  --Will need additional imaging with MRI or CT liver mass protocol as an outpatient  --Outpatient GI follow up-will place referral at discharge.  Discussed with patient.     History of pulmonary embolism  -Completed 1 year of Eliquis therapy.  Per Dr. Ramon's last note, patient was instructed to discontinue Eliquis in March.    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 Ramon outpatient     Diabetes mellitus type 2  -Has not been taking insulin for over 10 days.  -Contininue Levemir 20 units nightly with moderate SSI, jardiance  -Last A1c was 8.8%    DVT prophylaxis:  Medical DVT prophylaxis orders are present.       AM-PAC 6 Clicks Score (PT): 23 (06/05/22 0800)    Disposition: I expect the patient to be discharged pending rehab placement.    CODE STATUS:   Code Status and Medical Interventions:   Ordered at: 06/03/22 0004     Level Of Support Discussed With:    Patient     Code Status (Patient has no pulse and is not breathing):    CPR (Attempt to Resuscitate)     Medical Interventions (Patient has pulse or is breathing):    Full Support       Ada Gautam MD  06/06/22

## 2022-06-07 LAB
GLUCOSE BLDC GLUCOMTR-MCNC: 135 MG/DL (ref 70–130)
GLUCOSE BLDC GLUCOMTR-MCNC: 181 MG/DL (ref 70–130)
GLUCOSE BLDC GLUCOMTR-MCNC: 206 MG/DL (ref 70–130)
GLUCOSE BLDC GLUCOMTR-MCNC: 222 MG/DL (ref 70–130)
QT INTERVAL: 398 MS
QTC INTERVAL: 438 MS

## 2022-06-07 PROCEDURE — 96372 THER/PROPH/DIAG INJ SC/IM: CPT

## 2022-06-07 PROCEDURE — 93005 ELECTROCARDIOGRAM TRACING: CPT | Performed by: INTERNAL MEDICINE

## 2022-06-07 PROCEDURE — 93010 ELECTROCARDIOGRAM REPORT: CPT | Performed by: INTERNAL MEDICINE

## 2022-06-07 PROCEDURE — G0378 HOSPITAL OBSERVATION PER HR: HCPCS

## 2022-06-07 PROCEDURE — 63710000001 INSULIN LISPRO (HUMAN) PER 5 UNITS: Performed by: INTERNAL MEDICINE

## 2022-06-07 PROCEDURE — 25010000002 HEPARIN (PORCINE) PER 1000 UNITS: Performed by: INTERNAL MEDICINE

## 2022-06-07 PROCEDURE — 63710000001 INSULIN DETEMIR PER 5 UNITS: Performed by: INTERNAL MEDICINE

## 2022-06-07 PROCEDURE — 99225 PR SBSQ OBSERVATION CARE/DAY 25 MINUTES: CPT | Performed by: INTERNAL MEDICINE

## 2022-06-07 PROCEDURE — 97530 THERAPEUTIC ACTIVITIES: CPT

## 2022-06-07 PROCEDURE — 82962 GLUCOSE BLOOD TEST: CPT

## 2022-06-07 PROCEDURE — 94799 UNLISTED PULMONARY SVC/PX: CPT

## 2022-06-07 PROCEDURE — 97535 SELF CARE MNGMENT TRAINING: CPT

## 2022-06-07 PROCEDURE — 94660 CPAP INITIATION&MGMT: CPT

## 2022-06-07 RX ORDER — FUROSEMIDE 40 MG/1
40 TABLET ORAL DAILY
Qty: 30 TABLET | Refills: 0 | Status: SHIPPED | OUTPATIENT
Start: 2022-06-08 | End: 2022-10-30 | Stop reason: HOSPADM

## 2022-06-07 RX ORDER — HEPARIN SODIUM 5000 [USP'U]/ML
5000 INJECTION, SOLUTION INTRAVENOUS; SUBCUTANEOUS EVERY 8 HOURS SCHEDULED
Status: DISCONTINUED | OUTPATIENT
Start: 2022-06-07 | End: 2022-06-10 | Stop reason: HOSPADM

## 2022-06-07 RX ADMIN — FLUTICASONE PROPIONATE 2 SPRAY: 50 SPRAY, METERED NASAL at 08:56

## 2022-06-07 RX ADMIN — AMLODIPINE BESYLATE 5 MG: 5 TABLET ORAL at 13:14

## 2022-06-07 RX ADMIN — INSULIN DETEMIR 20 UNITS: 100 INJECTION, SOLUTION SUBCUTANEOUS at 21:44

## 2022-06-07 RX ADMIN — ALLOPURINOL 300 MG: 300 TABLET ORAL at 08:55

## 2022-06-07 RX ADMIN — EMPAGLIFLOZIN 10 MG: 10 TABLET, FILM COATED ORAL at 08:56

## 2022-06-07 RX ADMIN — APIXABAN 5 MG: 5 TABLET, FILM COATED ORAL at 08:56

## 2022-06-07 RX ADMIN — INSULIN LISPRO 2 UNITS: 100 INJECTION, SOLUTION INTRAVENOUS; SUBCUTANEOUS at 13:01

## 2022-06-07 RX ADMIN — INSULIN LISPRO 4 UNITS: 100 INJECTION, SOLUTION INTRAVENOUS; SUBCUTANEOUS at 17:29

## 2022-06-07 RX ADMIN — HEPARIN SODIUM 5000 UNITS: 5000 INJECTION, SOLUTION INTRAVENOUS; SUBCUTANEOUS at 15:13

## 2022-06-07 RX ADMIN — METOPROLOL SUCCINATE 100 MG: 100 TABLET, EXTENDED RELEASE ORAL at 13:14

## 2022-06-07 RX ADMIN — FUROSEMIDE 40 MG: 40 TABLET ORAL at 13:14

## 2022-06-07 RX ADMIN — HEPARIN SODIUM 5000 UNITS: 5000 INJECTION, SOLUTION INTRAVENOUS; SUBCUTANEOUS at 21:44

## 2022-06-07 RX ADMIN — SACUBITRIL AND VALSARTAN 1 TABLET: 24; 26 TABLET, FILM COATED ORAL at 13:14

## 2022-06-07 NOTE — THERAPY TREATMENT NOTE
Patient Name: Blane Dietz  : 1935    MRN: [id number]                              Today's Date: 2022       Admit Date: 2022    Visit Dx:     ICD-10-CM ICD-9-CM   1. Acute on chronic congestive heart failure, unspecified heart failure type (HCC)  I50.9 428.0   2. Hypoxemia requiring supplemental oxygen  R09.02 799.02    Z99.81    3. Diabetes mellitus type 2 in obese (HCC)  E11.69 250.00    E66.9 278.00   4. Elevated blood pressure reading with diagnosis of hypertension  I10 401.9   5. 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)   • PICC (peripherally inserted central catheter) flush   • Acute on chronic congestive heart failure, unspecified heart failure type (HCC)   • Elevated troponin   • Cirrhosis of liver (HCC)   • Elevated transaminase level   • Pleural effusion, bilateral   • Normocytic anemia   • History of pulmonary embolism     Past Medical History:   Diagnosis Date   • Aortic stenosis     status post ROSS procedure, remote, with 2015 echocardiography revealing normal aortic and pulmonary valve function, normal ejection fraction and mild to moderate MR   • Arthritis    • Bilateral impacted cerumen 2016   • Bronchitis    • Chronic gout of right foot 2016    Impression: 2016 - stable.;    • COVID-19 vaccine series completed 2021    [place] 2nd dose 3/12/21.   • Depression    • Diabetes mellitus  (HCC)    • Diverticulitis    • Exogenous obesity    • Gout    • Heart murmur    • History of vitamin D deficiency    • Hypercholesteremia 8/11/2016   • Hyperlipidemia    • Hypertension    • Kidney lesion    • Malignant neoplasm of prostate (HCC)    • Morbid obesity (HCC) 8/11/2016   • Pneumonia 05/12/2021   • Primary osteoarthritis involving multiple joints 6/13/2016    Impression: 03/08/2016 - stable;    • Pulmonary embolism (HCC)    • Sleep apnea 05/12/2021    C-Pap   • Uncontrolled type 2 diabetes mellitus with hyperglycemia (HCC) 6/13/2016    Impression: 03/08/2016 - seeing Endo .;    • Vertigo      Past Surgical History:   Procedure Laterality Date   • CARDIAC VALVE REPLACEMENT  05/12/2021    Ross procedure 22 years ago   • COLON SURGERY     • COLONOSCOPY     • COLOSTOMY  1999   • COLOSTOMY REVISION     • CYSTOSCOPY N/A 12/7/2021    Procedure: CYSTOSCOPY FLEXIBLE;  Surgeon: José Miguel Villafuerte Jr., MD;  Location: [facility] OR;  Service: Urology;  Laterality: N/A;   • EXPLORATORY LAPAROTOMY      With Tissue Removal   • LIPOMA EXCISION     • MOHS SURGERY     • NEPHROURETERECTOMY Right 12/7/2021    Procedure: RIGHT NEPHROURETERECTOMY LAPAROSCOPIC WITH DAVINCI ROBOT;  Surgeon: José Miguel Villafuerte Jr., MD;  Location: [facility] OR;  Service: Robotics - DaVinci;  Laterality: Right;   • OTHER SURGICAL HISTORY      Porcine Valve   • PROSTATE SURGERY     • PROSTATECTOMY  2000     History of Prostatectomy Perineal Radical   • SKIN CANCER EXCISION      from head and lip   • TONSILLECTOMY        General Information     Row Name 06/07/22 1040          OT Time and Intention    Document Type therapy note (daily note)  -JY     Mode of Treatment occupational therapy;individual therapy  -JY     Row Name 06/07/22 1040          General Information    Patient Profile Reviewed yes  -JY     Existing Precautions/Restrictions fall;oxygen therapy device and L/min  -JY     Barriers to Rehab medically complex  -JY     Row Name 06/07/22 1040  "         Cognition    Orientation Status (Cognition) oriented x 4  -JY     Row Name 06/07/22 1040          Safety Issues, Functional Mobility    Safety Issues Affecting Function (Mobility) awareness of need for assistance;insight into deficits/self-awareness;judgment;problem-solving;safety precaution awareness;safety precautions follow-through/compliance;sequencing abilities  -JKATHLEEN     Impairments Affecting Function (Mobility) balance;endurance/activity tolerance;shortness of breath;strength;postural/trunk control;cognition  -JY     Cognitive Impairments, Mobility Safety/Performance insight into deficits/self-awareness;judgment;problem-solving/reasoning;safety precaution awareness;safety precaution follow-through;sequencing abilities;other (see comments)  pt with very brief, intermittent confusion referencing being \"here\" in [place] vs [place] when just prior stating anticipated d/c to [place]  -SARA     Comment, Safety Issues/Impairments (Mobility) pt alert and able to follow commands, pt req'd initial motivational cues, education on OT purpose to complete progressive mobility OOB and ADLs as pt noted, \"likely discharging home or to rehab and don't really need to do this  -SARA           User Key  (r) = Recorded By, (t) = Taken By, (c) = Cosigned By    Initials Name Provider Type    Olimpia Cook OT Occupational Therapist                 Mobility/ADL's     Row Name 06/07/22 1045          Bed Mobility    Bed Mobility supine-sit;sit-supine;scooting/bridging  -SARA     Scooting/Bridging [place] (Bed Mobility) supervision;verbal cues  -SARA     Supine-Sit [place] (Bed Mobility) contact guard;verbal cues  -SARA     Sit-Supine [place] (Bed Mobility) contact guard;verbal cues  -SARA     Assistive Device (Bed Mobility) head of bed elevated;bed rails  -SARA     Comment, (Bed Mobility) pt demonstrated need for gross CGA for bed mobility, largely for lattermost uprighting of trunk into sitting and to elevate LEs back " to bed height in return to supine; positioned in sidelying L to support hx of R nephrectomy; denied any dizziness or feeling LH upon sitting, SPO2 maintained >/= 90% with supplemental O2  -JKATHLEEN     Row Name 06/07/22 1045          Transfers    Transfers sit-stand transfer;stand-sit transfer  -JY     Comment, (Transfers) skilled cues for optimal hand placement for controlled ascend, descend specifically to reach back prior to sitting and pushing up from seated surfaces vs grasping/pulling at FWW; pt did not require significant physical A yet req'd said cues for safety  -JY     Sit-Stand [place] (Transfers) supervision;verbal cues  -JY     Stand-Sit [place] (Transfers) contact guard;verbal cues  -JY     Row Name 06/07/22 1045          Sit-Stand Transfer    Assistive Device (Sit-Stand Transfers) walker, front-wheeled  -SARA     Row Name 06/07/22 1045          Stand-Sit Transfer    Assistive Device (Stand-Sit Transfers) walker, front-wheeled  -SARA     Row Name 06/07/22 1045          Functional Mobility    Functional Mobility- Comment defer to PT for specifics regarding fxl ambulation, pt demonstrated need for CGA with FWW during lateral stepping to move self toward HOB for improved positioning once seated, v/c for optimal placement of FWW with self as steps advanced  -JKATHLEEN     Row Name 06/07/22 1045          Activities of Daily Living    BADL Assessment/Intervention lower body dressing;upper body dressing;grooming  -[facility] Name 06/07/22 1045          Lower Body Dressing Assessment/Training    [place] Level (Lower Body Dressing) doff;don;socks;supervision;verbal cues  -JY     Position (Lower Body Dressing) edge of bed sitting  -JY     Comment, (Lower Body Dressing) pt able to demo figure 4 position while seated to reach feet more proximally and d/d without physical A  -JY     Row Name 06/07/22 1045          Upper Body Dressing Assessment/Training    [place] Level (Upper Body Dressing)  doff;don;pajama/robe;minimum assist (75% patient effort);verbal cues  -JY     Position (Upper Body Dressing) unsupported sitting;edge of bed sitting  -JY     Comment, (Upper Body Dressing) min A for grossly proximal and posterior management of gown and posterior tying/untying of gown  -JY     Row Name 06/07/22 1045          Grooming Assessment/Training    [place] Level (Grooming) wash face, hands;standby assist;verbal cues  -JY     Position (Grooming) supported standing;sink side  -JY     Comment, (Grooming) gross SBA for face/hand washing tasks, CGA for standing balance while pt stood sink side  -JY           User Key  (r) = Recorded By, (t) = Taken By, (c) = Cosigned By    Initials Name Provider Type    Olimpia Cook OT Occupational Therapist               Obj/Interventions     Row Name 06/07/22 1053          Motor Skills    Motor Skills functional endurance  -JY     Functional Endurance pt presented this date with standing activity tolerance of ~ 5 minutes while supported with FWW and completing functional tasks with SPO2 grossly >/= 90% while monitored, pt denied any SOA yet did seek seated rest break after standing trial  -JY     Row Name 06/07/22 1053          Balance    Balance Assessment sitting static balance;sitting dynamic balance;standing static balance;standing dynamic balance  -JY     Static Sitting Balance independent  -JY     Dynamic Sitting Balance supervision;other (see comments)  LBD  -JY     Static Standing Balance supervision;verbal cues  -JY     Dynamic Standing Balance contact guard;verbal cues  -JY     Position/Device Used, Standing Balance supported;walker, front-wheeled  -JY     Balance Interventions sitting;standing;static;dynamic;sit to stand;supported;occupation based/functional task  -JY     Comment, Balance no overt LOB during seated or standing tasks, utilized FWW throughout standing for support, able to complete more dynamic LBD (d/d socks) while unsupported at EOB this  date  -JY           User Key  (r) = Recorded By, (t) = Taken By, (c) = Cosigned By    Initials Name Provider Type    Olimpia Cook, SAVANNAH Occupational Therapist               Goals/Plan    No documentation.                Clinical Impression     Row Name 06/07/22 1057          Pain Assessment    Pretreatment Pain Rating 0/10 - no pain  -JY     Posttreatment Pain Rating 0/10 - no pain  -JY     Pre/Posttreatment Pain Comment denies any pain and tolerated OT interventions  -JY     Pain Intervention(s) Repositioned;Ambulation/increased activity  -JY     Row Name 06/07/22 1057          Plan of Care Review    Plan of Care Reviewed With patient  -SARA     Progress improving  -JY     Outcome Evaluation Pt demonstrates improved performance in ADLs, activity tolerance toward more dynamic tasks both in sitting EOB and standing at sink side and fxl transfers to achieve different positions after initial motivational cues/education for change of position. Pt demonstrated supine<> sitting with CGA, spv for scooting to EOB, spv for STS at EOB and CGA for standing sink side and lateral stepping for positioning with FWW. Pt at EOB and unsupported able to complete d/d socks with spv, d/d gown with min A and wash face/hands with SBA while standing with CGA for balance at sink. Pt stood for increased ~ 5 mins w/ SPO2 >/= 90% throughout with supplemental O2, rest break after standing tolerance yet with pt reporting decreased perceived exertion/SOA. Will progress pt as able during hospitalization.  -JY     Row Name 06/07/22 1057          Vital Signs    Pre Systolic BP Rehab --  u/a to obtain BP w/ error in reading, RN aware  -JY     Pretreatment Heart Rate (beats/min) 69  -JY     Posttreatment Heart Rate (beats/min) 74  -JY     Pre SpO2 (%) 96  -JY     O2 Delivery Pre Treatment supplemental O2  -JY     Intra SpO2 (%) 91  -JY     O2 Delivery Intra Treatment supplemental O2  -JY     Post SpO2 (%) 94  -JY     O2 Delivery Post Treatment  supplemental O2  -JY     Pre Patient Position Supine  -JY     Intra Patient Position Standing  -JY     Post Patient Position Supine  -JY     Row Name 06/07/22 1057          Positioning and Restraints    Pre-Treatment Position in bed  -JY     Post Treatment Position bed  -JY     In Bed notified nsg;side lying left;call light within reach;encouraged to call for assist;exit alarm on;side rails up x2  -JY           User Key  (r) = Recorded By, (t) = Taken By, (c) = Cosigned By    Initials Name Provider Type    Olimpia Cook OT Occupational Therapist               Outcome Measures     Row Name 06/07/22 1107          How much help from another is currently needed...    Putting on and taking off regular lower body clothing? 3  -JY     Bathing (including washing, rinsing, and drying) 3  -JY     Toileting (which includes using toilet bed pan or urinal) 3  -JY     Putting on and taking off regular upper body clothing 3  -JY     Taking care of personal grooming (such as brushing teeth) 3  -JY     Eating meals 3  -JY     AM-PAC 6 Clicks Score (OT) 18  -JY     Row Name 06/07/22 1107          Functional Assessment    Outcome Measure Options AM-PAC 6 Clicks Daily Activity (OT)  -JY           User Key  (r) = Recorded By, (t) = Taken By, (c) = Cosigned By    Initials Name Provider Type    Olimpia Cook OT Occupational Therapist                Occupational Therapy Education                 Title: PT OT SLP Therapies (In Progress)     Topic: Occupational Therapy (In Progress)     Point: ADL training (In Progress)     Description:   Instruct learner(s) on proper safety adaptation and remediation techniques during self care or transfers.   Instruct in proper use of assistive devices.              Learning Progress Summary           Patient Acceptance, E,D, NR by SARA at 6/7/2022 0921    Acceptance, E,D, VU,DU,NR by  at 6/3/2022 1538    Comment: PLB, safety awareness w/ AD                   Point: Home exercise program (Not  Started)     Description:   Instruct learner(s) on appropriate technique for monitoring, assisting and/or progressing therapeutic exercises/activities.              Learner Progress:  Not documented in this visit.          Point: Precautions (In Progress)     Description:   Instruct learner(s) on prescribed precautions during self-care and functional transfers.              Learning Progress Summary           Patient Acceptance, E,D, NR by SARA at 6/7/2022 0921    Acceptance, E,D, VU,DU,NR by  at 6/3/2022 1538    Comment: PLB, safety awareness w/ AD                   Point: Body mechanics (In Progress)     Description:   Instruct learner(s) on proper positioning and spine alignment during self-care, functional mobility activities and/or exercises.              Learning Progress Summary           Patient Acceptance, E,D, NR by SARA at 6/7/2022 0921    Acceptance, E,D, VU,DU,NR by  at 6/3/2022 1538    Comment: PLB, safety awareness w/ AD                               User Key     Initials Effective Dates Name Provider Type Discipline    SARA 06/16/21 -  Olimpia Duggan, OT Occupational Therapist OT     06/16/21 -  Matthew Sloan OT Occupational Therapist OT              OT Recommendation and Plan     Plan of Care Review  Plan of Care Reviewed With: patient  Progress: improving  Outcome Evaluation: Pt demonstrates improved performance in ADLs, activity tolerance toward more dynamic tasks both in sitting EOB and standing at sink side and fxl transfers to achieve different positions after initial motivational cues/education for change of position. Pt demonstrated supine<> sitting with CGA, spv for scooting to EOB, spv for STS at EOB and CGA for standing sink side and lateral stepping for positioning with FWW. Pt at EOB and unsupported able to complete d/d socks with spv, d/d gown with min A and wash face/hands with SBA while standing with CGA for balance at sink. Pt stood for increased ~ 5 mins w/ SPO2 >/= 90%  throughout with supplemental O2, rest break after standing tolerance yet with pt reporting decreased perceived exertion/SOA. Will progress pt as able during hospitalization.     Time Calculation:    Time Calculation- OT     Row Name 06/07/22 1109             Time Calculation- OT    OT Received On 06/07/22  -JY      OT Goal Re-Cert Due Date 06/13/22  -JY              Timed Charges    48075 - OT Therapeutic Activity Minutes 23  -JY      98157 - OT Self Care/Mgmt Minutes 15  -JY              Total Minutes    Timed Charges Total Minutes 38  -JY       Total Minutes 38  -JY            User Key  (r) = Recorded By, (t) = Taken By, (c) = Cosigned By    Initials Name Provider Type    Olimpia Cook OT Occupational Therapist              Therapy Charges for Today     Code Description Service Date Service Provider Modifiers Qty    02079867926 HC OT THERAPEUTIC ACT EA 15 MIN 6/7/2022 Olimpia Duggan OT GO 2    30297835307 HC OT SELF CARE/MGMT/TRAIN EA 15 MIN 6/7/2022 Olimpia Duggan OT GO 1               Olimpia Duggan OT  6/7/2022

## 2022-06-07 NOTE — PROGRESS NOTES
Blane Dietz  1935  S336/1      Patient is awaiting placement.  Cardiology will see PRN.  Pt has previously scheduled FU appt with Dr. Toussaint on August 10, 2022 at 3 p.m.    Patient should DC on the following cardiac medications:  Norvasc 5mg daily, Eliquis 5mg twice daily, Lasix 40mg daily, Toprol XL 100mg daily, Entresto 24/26mg twice daily.    Ginny Cramer PA-C working with Dr. Ha Toussaint

## 2022-06-07 NOTE — PROGRESS NOTES
[facility]  PROGRESS NOTE    Patient Name: Blane Dietz  : 1935  MRN: [id number]    Date of Admission: 2022  Primary Care Physician: David Em MD    Subjective   Subjective     CC:  F/U CHF    HPI:  Pt doing okay this am, working with PT. Pt hypotensive this am, so cardiac meds were held.     ROS:  Gen- No fevers, chills  CV- No chest pain, palpitations  Resp- No cough, dyspnea  GI- No N/V/D, abd pain        Objective   Objective     Vital Signs:   Temp:  [97.6 °F (36.4 °C)-98.5 °F (36.9 °C)] 97.8 °F (36.6 °C)  Heart Rate:  [60-88] 78  Resp:  [16-22] 16  BP: ()/(50-80) 93/73     Physical Exam:  Constitutional: No acute distress, awake, alert  HENT: NCAT, mucous membranes moist  Respiratory: Respiratory effort normal on nasal cannula  Cardiovascular: RRR  Musculoskeletal: Trace bilateral ankle edema  Psychiatric: Appropriate affect, cooperative  Neurologic: Speech clear and fluent, confused at times (doesn't remember that we met yesterday)  Skin: No rashes on exposed surfaces    Results Reviewed:  LAB RESULTS:      Lab 22  1510 22  1703   WBC 11.63* 9.69   HEMOGLOBIN 12.2* 12.9*   HEMATOCRIT 39.8 41.3   PLATELETS 210 188   NEUTROS ABS 8.89* 7.05*   IMMATURE GRANS (ABS) 0.17* 0.22*   LYMPHS ABS 1.22 1.30   MONOS ABS 1.08* 0.70   EOS ABS 0.09 0.21   MCV 87.9 87.3   CRP  --  <0.30   PROCALCITONIN  --  0.11   LACTATE  --  1.6   LDH  --  304*         Lab 22  0801 22  2351 22  0854 22  0801 22  1510 22  1703   SODIUM 142  --  145 142 141 140   POTASSIUM 3.8  --  3.8 4.1 4.2 4.2   CHLORIDE 101  --  101 99 101 102   CO2 31.0*  --  35.0* 31.0* 30.0* 27.0   ANION GAP 10.0  --  9.0 12.0 10.0 11.0   BUN 42*  --  36* 28* 21 14   CREATININE 1.40*  --  1.41* 1.36* 1.28* 1.15   EGFR 49.6*  --  49.1* 51.3* 55.2* 62.8   GLUCOSE 94  --  147* 138* 127* 277*   CALCIUM 9.6  --  9.5 9.1 10.1 9.2   MAGNESIUM  --  2.1  --    --  2.0  --    HEMOGLOBIN A1C  --   --   --   --  9.10*  --    TSH  --   --   --   --  1.580  --          Lab 06/02/22  1703   TOTAL PROTEIN 7.3   ALBUMIN 4.20   GLOBULIN 3.1   ALT (SGPT) 48*   AST (SGOT) 48*   BILIRUBIN 1.2   ALK PHOS 227*         Lab 06/03/22  1510 06/02/22  2131 06/02/22  1703   PROBNP  --   --  5,664.0*   TROPONIN T 0.052* 0.047* 0.041*         Lab 06/03/22  1510   CHOLESTEROL 169   LDL CHOL 78   HDL CHOL 74*   TRIGLYCERIDES 97         Lab 06/02/22  1703   FERRITIN 167.30         Lab 06/02/22  2351   PH, ARTERIAL 7.359   PCO2, ARTERIAL 46.0*   PO2 ART 87.7   FIO2 36   HCO3 ART 25.9   BASE EXCESS ART 0.0   CARBOXYHEMOGLOBIN 0.9     Brief Urine Lab Results  (Last result in the past 365 days)      Color   Clarity   Blood   Leuk Est   Nitrite   Protein   CREAT   Urine HCG        12/06/21 0908 Yellow   Clear   Negative   Negative   Negative   Negative                 Microbiology Results Abnormal     Procedure Component Value - Date/Time    COVID PRE-OP / PRE-PROCEDURE SCREENING ORDER (NO ISOLATION) - Swab, Nasopharynx [[id number]]  (Normal) Collected: 06/02/22 1650    Lab Status: Final result Specimen: Swab from Nasopharynx Updated: 06/02/22 1732    Narrative:      The following orders were created for panel order COVID PRE-OP / PRE-PROCEDURE SCREENING ORDER (NO ISOLATION) - Swab, Nasopharynx.  Procedure                               Abnormality         Status                     ---------                               -----------         ------                     COVID-19 and FLU A/B PCR...[[id number]]  Normal              Final result                 Please view results for these tests on the individual orders.    COVID-19 and FLU A/B PCR - Swab, Nasopharynx [[id number]]  (Normal) Collected: 06/02/22 1650    Lab Status: Final result Specimen: Swab from Nasopharynx Updated: 06/02/22 1732     COVID19 Not Detected     Influenza A PCR Not Detected     Influenza B PCR Not Detected    Narrative:      Fact  sheet for providers: https://www.fda.gov/media/279556/download    Fact sheet for patients: https://www.fda.gov/media/542850/download    Test performed by PCR.          No radiology results from the last 24 hrs    Results for orders placed during the hospital encounter of 06/02/22    Adult Transthoracic Echo Complete w/ Color, Spectral and Contrast if necessary per protocol    Interpretation Summary  · There is a left pleural effusion. There is a right pleural effusion.  · Left ventricular wall thickness is consistent with hypertrophy.  · The right ventricular cavity is mildly dilated.  · Left atrial volume is mildly increased.  · The right atrial cavity is dilated.  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  · There is a bioprosthetic aortic valve present.  · Peak velocity of the flow distal to the aortic valve is 105.1 cm/s. Aortic valve mean pressure gradient is 3 mmHg.  · Aortic valve area is 2.9 cm2.  · Moderate aortic valve regurgitation is present.  · Moderate tricuspid valve regurgitation is present.  · Estimated right ventricular systolic pressure from tricuspid regurgitation is moderately elevated (45-55 mmHg). Calculated right ventricular systolic pressure from tricuspid regurgitation is 52 mmHg.  · Ejection fraction is low normal      I have reviewed the medications:  Scheduled Meds:allopurinol, 300 mg, Oral, Daily  amLODIPine, 5 mg, Oral, Daily  apixaban, 5 mg, Oral, Q12H  empagliflozin, 10 mg, Oral, Daily  fluticasone, 2 spray, Nasal, Daily  furosemide, 40 mg, Oral, Daily  insulin detemir, 20 Units, Subcutaneous, Nightly  insulin lispro, 0-9 Units, Subcutaneous, TID AC  metoprolol succinate XL, 100 mg, Oral, Daily  pharmacy consult - [place], , Does not apply, Daily  sacubitril-valsartan, 1 tablet, Oral, Q12H  sodium chloride, 10 mL, Intravenous, Q12H      Continuous Infusions:   PRN Meds:.•  acetaminophen **OR** acetaminophen **OR** acetaminophen  •  senna-docusate sodium **AND** polyethylene  glycol **AND** bisacodyl **AND** bisacodyl  •  dextrose  •  dextrose  •  glucagon (human recombinant)  •  HYDROcodone-acetaminophen  •  melatonin  •  sodium chloride  •  sodium chloride    Assessment & Plan   Assessment & Plan     Active Hospital Problems    Diagnosis  POA   • **Acute on chronic respiratory failure with hypoxia (HCC) [J96.21]  Unknown   • Elevated troponin [R77.8]  Unknown   • Cirrhosis of liver (HCC) [K74.60]  Unknown   • Elevated transaminase level [R74.01]  Unknown   • Pleural effusion, bilateral [J90]  Unknown   • Normocytic anemia [D64.9]  Unknown   • History of pulmonary embolism [Z86.711]  Unknown   • Acute on chronic congestive heart failure, unspecified heart failure type (HCC) [I50.9]  Yes   • 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        Brief Hospital Course to date:  Blane Dietz is a 86 y.o. 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    This patient's problems and plans were partially entered by my partner and updated as appropriate by me 06/07/22.    Acute on chronic hypoxic respiratory failure  Acute exacerbation of diastolic CHF  Bilateral  pleural effusions  Aortic stenosis s/p ROSS procedure  Elevated troponin  HUSSAIN on CPAP  --Likely due to medication non-adherence  --S/P IV diuresis  --Cardiology following, transitioned to PO lasix, started entresto. Held this am due to hypotension  --Continue home jardiance  -- Cr stable on last check, refuses repeat labs today     Elevated transaminase levels  Cirrhosis  Liver lesion  -Imaging concerning for cirrhosis with lesion in left lobe of liver  --Will need additional imaging with MRI or CT liver mass protocol as an outpatient  --Outpatient GI follow up-will place referral at discharge.  Discussed with patient.     History of pulmonary embolism  -Completed 1 year of Eliquis therapy.  Per Dr. Ramon's last note, patient was instructed to discontinue Eliquis in March.    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 Ramon outpatient     Diabetes mellitus type 2  -Has not been taking insulin for over 10 days.  -Contininue Levemir 20 units nightly with moderate SSI, jardiance  -Last A1c was 8.8%    DVT prophylaxis:  No DVT prophylaxis order currently exists. Will order ZHENG (Cards had restarted Eliquis, however, I see no indication for this any longer, so d/c'd)       AM-PAC 6 Clicks Score (PT): 23 (06/06/22 0830)    Disposition: I expect the patient to be discharged pending rehab placement.    CODE STATUS:   Code Status and Medical Interventions:   Ordered at: 06/03/22 0004     Level Of Support Discussed With:    Patient     Code Status (Patient has no pulse and is not breathing):    CPR (Attempt to Resuscitate)     Medical Interventions (Patient has pulse or is breathing):    Full Support       Ada Gautam MD  06/07/22

## 2022-06-07 NOTE — CASE MANAGEMENT/SOCIAL WORK
Case Management Discharge Note      Final Note: Plan is home with [facility] PT/OT. Susana (POA) will transport. Spoke with Ann at [facility] and she accepted the patient. BRENNAN attempted 3 times today to speak with Alondra the admissions coordinator at [facility] with no response.    Provided Post Acute Provider List?: Yes  Post Acute Provider List: Nursing Home  Delivered To: Patient  Method of Delivery: In person    Selected Continued Care - Admitted Since 6/2/2022     Destination    No services have been selected for the patient.              Durable Medical Equipment    No services have been selected for the patient.              Dialysis/Infusion    No services have been selected for the patient.              Home Medical Care Coordination complete.    Service Provider Selected Services Address Phone Fax Patient Preferred    [facility]  [facility] [address] [id number] [phone] --          Therapy    No services have been selected for the patient.              Community Resources    No services have been selected for the patient.              Community & DME    No services have been selected for the patient.                       Final Discharge Disposition Code: 06 - home with home health care

## 2022-06-07 NOTE — CASE MANAGEMENT/SOCIAL WORK
Continued Stay Note  [facility]     Patient Name: Blane Dietz  MRN: [id number]  Today's Date: 6/7/2022    Admit Date: 6/2/2022     Discharge Plan     Row Name 06/07/22 1203       Plan    Plan [facility]    Patient/Family in Agreement with Plan yes    Plan Comments Spoke to patient at bedside.Plan is [facility]. CM left a voicemail message for Alondra at [facility] to verify if they can accept the patient and faxed updated notes. CM will continue to follow.    Final Discharge Disposition Code 03 - skilled nursing facility (SNF)               Discharge Codes    No documentation.               Expected Discharge Date and Time     Expected Discharge Date Expected Discharge Time    Jun 7, 2022             Jesus Saunders RN

## 2022-06-08 LAB
ANION GAP SERPL CALCULATED.3IONS-SCNC: 12 MMOL/L (ref 5–15)
BASOPHILS # BLD AUTO: 0.23 10*3/MM3 (ref 0–0.2)
BASOPHILS NFR BLD AUTO: 2.3 % (ref 0–1.5)
BUN SERPL-MCNC: 47 MG/DL (ref 8–23)
BUN/CREAT SERPL: 32.4 (ref 7–25)
CALCIUM SPEC-SCNC: 9.5 MG/DL (ref 8.6–10.5)
CHLORIDE SERPL-SCNC: 99 MMOL/L (ref 98–107)
CO2 SERPL-SCNC: 28 MMOL/L (ref 22–29)
CREAT SERPL-MCNC: 1.45 MG/DL (ref 0.76–1.27)
DEPRECATED RDW RBC AUTO: 46.4 FL (ref 37–54)
EGFRCR SERPLBLD CKD-EPI 2021: 47.5 ML/MIN/1.73
EOSINOPHIL # BLD AUTO: 0.31 10*3/MM3 (ref 0–0.4)
EOSINOPHIL NFR BLD AUTO: 3.1 % (ref 0.3–6.2)
ERYTHROCYTE [DISTWIDTH] IN BLOOD BY AUTOMATED COUNT: 15.4 % (ref 12.3–15.4)
GLUCOSE BLDC GLUCOMTR-MCNC: 101 MG/DL (ref 70–130)
GLUCOSE BLDC GLUCOMTR-MCNC: 156 MG/DL (ref 70–130)
GLUCOSE BLDC GLUCOMTR-MCNC: 210 MG/DL (ref 70–130)
GLUCOSE BLDC GLUCOMTR-MCNC: 230 MG/DL (ref 70–130)
GLUCOSE SERPL-MCNC: 83 MG/DL (ref 65–99)
HCT VFR BLD AUTO: 38.8 % (ref 37.5–51)
HGB BLD-MCNC: 12.2 G/DL (ref 13–17.7)
IMM GRANULOCYTES # BLD AUTO: 0.21 10*3/MM3 (ref 0–0.05)
IMM GRANULOCYTES NFR BLD AUTO: 2.1 % (ref 0–0.5)
LYMPHOCYTES # BLD AUTO: 1.84 10*3/MM3 (ref 0.7–3.1)
LYMPHOCYTES NFR BLD AUTO: 18.6 % (ref 19.6–45.3)
MCH RBC QN AUTO: 26.6 PG (ref 26.6–33)
MCHC RBC AUTO-ENTMCNC: 31.4 G/DL (ref 31.5–35.7)
MCV RBC AUTO: 84.7 FL (ref 79–97)
MONOCYTES # BLD AUTO: 0.8 10*3/MM3 (ref 0.1–0.9)
MONOCYTES NFR BLD AUTO: 8.1 % (ref 5–12)
NEUTROPHILS NFR BLD AUTO: 6.49 10*3/MM3 (ref 1.7–7)
NEUTROPHILS NFR BLD AUTO: 65.8 % (ref 42.7–76)
NRBC BLD AUTO-RTO: 0 /100 WBC (ref 0–0.2)
PLATELET # BLD AUTO: 191 10*3/MM3 (ref 140–450)
PMV BLD AUTO: 11.2 FL (ref 6–12)
POTASSIUM SERPL-SCNC: 3.7 MMOL/L (ref 3.5–5.2)
RBC # BLD AUTO: 4.58 10*6/MM3 (ref 4.14–5.8)
SODIUM SERPL-SCNC: 139 MMOL/L (ref 136–145)
WBC NRBC COR # BLD: 9.88 10*3/MM3 (ref 3.4–10.8)

## 2022-06-08 PROCEDURE — G0378 HOSPITAL OBSERVATION PER HR: HCPCS

## 2022-06-08 PROCEDURE — 94799 UNLISTED PULMONARY SVC/PX: CPT

## 2022-06-08 PROCEDURE — 99225 PR SBSQ OBSERVATION CARE/DAY 25 MINUTES: CPT | Performed by: INTERNAL MEDICINE

## 2022-06-08 PROCEDURE — 82962 GLUCOSE BLOOD TEST: CPT

## 2022-06-08 PROCEDURE — 96372 THER/PROPH/DIAG INJ SC/IM: CPT

## 2022-06-08 PROCEDURE — 85025 COMPLETE CBC W/AUTO DIFF WBC: CPT | Performed by: INTERNAL MEDICINE

## 2022-06-08 PROCEDURE — 94660 CPAP INITIATION&MGMT: CPT

## 2022-06-08 PROCEDURE — 25010000002 HEPARIN (PORCINE) PER 1000 UNITS: Performed by: INTERNAL MEDICINE

## 2022-06-08 PROCEDURE — 63710000001 INSULIN DETEMIR PER 5 UNITS: Performed by: INTERNAL MEDICINE

## 2022-06-08 PROCEDURE — 80048 BASIC METABOLIC PNL TOTAL CA: CPT | Performed by: INTERNAL MEDICINE

## 2022-06-08 PROCEDURE — 63710000001 INSULIN LISPRO (HUMAN) PER 5 UNITS: Performed by: INTERNAL MEDICINE

## 2022-06-08 RX ADMIN — INSULIN LISPRO 4 UNITS: 100 INJECTION, SOLUTION INTRAVENOUS; SUBCUTANEOUS at 12:43

## 2022-06-08 RX ADMIN — FUROSEMIDE 40 MG: 40 TABLET ORAL at 10:34

## 2022-06-08 RX ADMIN — INSULIN DETEMIR 20 UNITS: 100 INJECTION, SOLUTION SUBCUTANEOUS at 20:53

## 2022-06-08 RX ADMIN — HEPARIN SODIUM 5000 UNITS: 5000 INJECTION, SOLUTION INTRAVENOUS; SUBCUTANEOUS at 21:00

## 2022-06-08 RX ADMIN — Medication 5 MG: at 20:59

## 2022-06-08 RX ADMIN — HEPARIN SODIUM 5000 UNITS: 5000 INJECTION, SOLUTION INTRAVENOUS; SUBCUTANEOUS at 14:00

## 2022-06-08 RX ADMIN — HEPARIN SODIUM 5000 UNITS: 5000 INJECTION, SOLUTION INTRAVENOUS; SUBCUTANEOUS at 05:20

## 2022-06-08 RX ADMIN — INSULIN LISPRO 2 UNITS: 100 INJECTION, SOLUTION INTRAVENOUS; SUBCUTANEOUS at 18:57

## 2022-06-08 RX ADMIN — EMPAGLIFLOZIN 10 MG: 10 TABLET, FILM COATED ORAL at 10:48

## 2022-06-08 RX ADMIN — ALLOPURINOL 300 MG: 300 TABLET ORAL at 10:33

## 2022-06-08 RX ADMIN — Medication 10 ML: at 20:59

## 2022-06-08 NOTE — PROGRESS NOTES
[facility]  PROGRESS NOTE    Patient Name: Blane Dietz  : 1935  MRN: [id number]    Date of Admission: 2022  Primary Care Physician: David Em MD    Subjective   Subjective     CC:  F/U CHF    HPI:  No new issues overnight. Doing well this am. Really anxious to get to inpatient rehab.     ROS:  Gen- No fevers, chills  CV- No chest pain, palpitations  Resp- No cough, dyspnea  GI- No N/V/D, abd pain        Objective   Objective     Vital Signs:   Temp:  [96.8 °F (36 °C)-98.1 °F (36.7 °C)] 98.1 °F (36.7 °C)  Heart Rate:  [48-91] 59  Resp:  [16-22] 22  BP: ()/(56-89) 106/83  Flow (L/min):  [2-3] 3     Physical Exam:  Constitutional: No acute distress, awake, alert  HENT: NCAT, mucous membranes moist  Respiratory: Respiratory effort normal on nasal cannula  Cardiovascular: RRR  Musculoskeletal: Trace bilateral ankle edema  Psychiatric: Appropriate affect, cooperative  Neurologic: Speech clear and fluent, confused at times (doesn't remember that we met yesterday)  Skin: No rashes on exposed surfaces    Results Reviewed:  LAB RESULTS:      Lab 22  0804 22  1510 22  1703   WBC 9.88 11.63* 9.69   HEMOGLOBIN 12.2* 12.2* 12.9*   HEMATOCRIT 38.8 39.8 41.3   PLATELETS 191 210 188   NEUTROS ABS 6.49 8.89* 7.05*   IMMATURE GRANS (ABS) 0.21* 0.17* 0.22*   LYMPHS ABS 1.84 1.22 1.30   MONOS ABS 0.80 1.08* 0.70   EOS ABS 0.31 0.09 0.21   MCV 84.7 87.9 87.3   CRP  --   --  <0.30   PROCALCITONIN  --   --  0.11   LACTATE  --   --  1.6   LDH  --   --  304*         Lab 22  0804 22  0801 22  2351 22  0854 22  0801 22  1510   SODIUM 139 142  --  145 142 141   POTASSIUM 3.7 3.8  --  3.8 4.1 4.2   CHLORIDE 99 101  --  101 99 101   CO2 28.0 31.0*  --  35.0* 31.0* 30.0*   ANION GAP 12.0 10.0  --  9.0 12.0 10.0   BUN 47* 42*  --  36* 28* 21   CREATININE 1.45* 1.40*  --  1.41* 1.36* 1.28*   EGFR 47.5* 49.6*  --  49.1* 51.3*  55.2*   GLUCOSE 83 94  --  147* 138* 127*   CALCIUM 9.5 9.6  --  9.5 9.1 10.1   MAGNESIUM  --   --  2.1  --   --  2.0   HEMOGLOBIN A1C  --   --   --   --   --  9.10*   TSH  --   --   --   --   --  1.580         Lab 06/02/22  1703   TOTAL PROTEIN 7.3   ALBUMIN 4.20   GLOBULIN 3.1   ALT (SGPT) 48*   AST (SGOT) 48*   BILIRUBIN 1.2   ALK PHOS 227*         Lab 06/03/22  1510 06/02/22  2131 06/02/22  1703   PROBNP  --   --  5,664.0*   TROPONIN T 0.052* 0.047* 0.041*         Lab 06/03/22  1510   CHOLESTEROL 169   LDL CHOL 78   HDL CHOL 74*   TRIGLYCERIDES 97         Lab 06/02/22  1703   FERRITIN 167.30         Lab 06/02/22  2351   PH, ARTERIAL 7.359   PCO2, ARTERIAL 46.0*   PO2 ART 87.7   FIO2 36   HCO3 ART 25.9   BASE EXCESS ART 0.0   CARBOXYHEMOGLOBIN 0.9     Brief Urine Lab Results  (Last result in the past 365 days)      Color   Clarity   Blood   Leuk Est   Nitrite   Protein   CREAT   Urine HCG        12/06/21 0908 Yellow   Clear   Negative   Negative   Negative   Negative                 Microbiology Results Abnormal     Procedure Component Value - Date/Time    COVID PRE-OP / PRE-PROCEDURE SCREENING ORDER (NO ISOLATION) - Swab, Nasopharynx [[id number]]  (Normal) Collected: 06/02/22 1650    Lab Status: Final result Specimen: Swab from Nasopharynx Updated: 06/02/22 1732    Narrative:      The following orders were created for panel order COVID PRE-OP / PRE-PROCEDURE SCREENING ORDER (NO ISOLATION) - Swab, Nasopharynx.  Procedure                               Abnormality         Status                     ---------                               -----------         ------                     COVID-19 and FLU A/B PCR...[[id number]]  Normal              Final result                 Please view results for these tests on the individual orders.    COVID-19 and FLU A/B PCR - Swab, Nasopharynx [[id number]]  (Normal) Collected: 06/02/22 1650    Lab Status: Final result Specimen: Swab from Nasopharynx Updated: 06/02/22 1732      COVID19 Not Detected     Influenza A PCR Not Detected     Influenza B PCR Not Detected    Narrative:      Fact sheet for providers: https://www.fda.gov/media/919834/download    Fact sheet for patients: https://www.fda.gov/media/259075/download    Test performed by PCR.          No radiology results from the last 24 hrs    Results for orders placed during the hospital encounter of 06/02/22    Adult Transthoracic Echo Complete w/ Color, Spectral and Contrast if necessary per protocol    Interpretation Summary  · There is a left pleural effusion. There is a right pleural effusion.  · Left ventricular wall thickness is consistent with hypertrophy.  · The right ventricular cavity is mildly dilated.  · Left atrial volume is mildly increased.  · The right atrial cavity is dilated.  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  · There is a bioprosthetic aortic valve present.  · Peak velocity of the flow distal to the aortic valve is 105.1 cm/s. Aortic valve mean pressure gradient is 3 mmHg.  · Aortic valve area is 2.9 cm2.  · Moderate aortic valve regurgitation is present.  · Moderate tricuspid valve regurgitation is present.  · Estimated right ventricular systolic pressure from tricuspid regurgitation is moderately elevated (45-55 mmHg). Calculated right ventricular systolic pressure from tricuspid regurgitation is 52 mmHg.  · Ejection fraction is low normal      I have reviewed the medications:  Scheduled Meds:allopurinol, 300 mg, Oral, Daily  amLODIPine, 5 mg, Oral, Daily  empagliflozin, 10 mg, Oral, Daily  fluticasone, 2 spray, Nasal, Daily  furosemide, 40 mg, Oral, Daily  heparin (porcine), 5,000 Units, Subcutaneous, Q8H  insulin detemir, 20 Units, Subcutaneous, Nightly  insulin lispro, 0-9 Units, Subcutaneous, TID AC  metoprolol succinate XL, 100 mg, Oral, Daily  pharmacy consult - MTM, , Does not apply, Daily  sacubitril-valsartan, 1 tablet, Oral, Q12H      Continuous Infusions:   PRN Meds:.•  acetaminophen  **OR** acetaminophen **OR** acetaminophen  •  senna-docusate sodium **AND** polyethylene glycol **AND** bisacodyl **AND** bisacodyl  •  dextrose  •  dextrose  •  glucagon (human recombinant)  •  HYDROcodone-acetaminophen  •  melatonin  •  sodium chloride    Assessment & Plan   Assessment & Plan     Active Hospital Problems    Diagnosis  POA   • **Acute on chronic respiratory failure with hypoxia (HCC) [J96.21]  Unknown   • Elevated troponin [R77.8]  Unknown   • Cirrhosis of liver (HCC) [K74.60]  Unknown   • Elevated transaminase level [R74.01]  Unknown   • Pleural effusion, bilateral [J90]  Unknown   • Normocytic anemia [D64.9]  Unknown   • History of pulmonary embolism [Z86.711]  Unknown   • Acute on chronic congestive heart failure, unspecified heart failure type (HCC) [I50.9]  Yes   • 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        Brief Hospital Course to date:  Blane Dietz is a 86 y.o. 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    This patient's problems and plans were partially entered by my partner and updated as appropriate by me 06/08/22.    Acute on chronic hypoxic  respiratory failure  Acute exacerbation of diastolic CHF  Bilateral pleural effusions  Aortic stenosis s/p ROSS procedure  Elevated troponin  HUSSAIN on CPAP  --Likely due to medication non-adherence  --S/P IV diuresis  --Cardiology following, transitioned to PO lasix, started entresto. Held this am due to hypotension (again)  --Continue home jardiance  -- Cr stable on last check     Elevated transaminase levels  Cirrhosis  Liver lesion  -Imaging concerning for cirrhosis with lesion in left lobe of liver  --Will need additional imaging with MRI or CT liver mass protocol as an outpatient  --Outpatient GI follow up-will place referral at discharge.  Discussed with patient.     History of pulmonary embolism  -Completed 1 year of Eliquis therapy.  Per Dr. Ramon's last note, patient was instructed to discontinue Eliquis in March.    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 Ramon outpatient     Diabetes mellitus type 2  -Has not been taking insulin for over 10 days.  -Contininue Levemir 20 units nightly with moderate SSI, jardiance  -Last A1c was 8.8%    DVT prophylaxis:  Medical DVT prophylaxis orders are present.       AM-PAC 6 Clicks Score (PT): 19 (06/07/22 0900)    Disposition: I expect the patient to be discharged pending rehab placement.    CODE STATUS:   Code Status and Medical Interventions:   Ordered at: 06/03/22 0004     Level Of Support Discussed With:    Patient     Code Status (Patient has no pulse and is not breathing):    CPR (Attempt to Resuscitate)     Medical Interventions (Patient has pulse or is breathing):    Full Support       Ada Gautam MD  06/08/22

## 2022-06-08 NOTE — PROGRESS NOTES
[facility] Medicine Services  PROGRESS NOTE    Patient Name: Blane Dietz  : 1935  MRN: [id number]    Date of Admission: 2022  Primary Care Physician: David Em MD    Subjective   Subjective     CC:  F/U CHF    HPI:  Pt doing okay this am, working with PT. Pt hypotensive this am, so cardiac meds were held.     ROS:  Gen- No fevers, chills  CV- No chest pain, palpitations  Resp- No cough, dyspnea  GI- No N/V/D, abd pain        Objective   Objective     Vital Signs:   Temp:  [96.6 °F (35.9 °C)-97.6 °F (36.4 °C)] 97.6 °F (36.4 °C)  Heart Rate:  [48-79] 65  Resp:  [16-20] 20  BP: ()/(56-89) 92/72  Flow (L/min):  [2] 2     Physical Exam:  Constitutional: No acute distress, awake, alert  HENT: NCAT, mucous membranes moist  Respiratory: Respiratory effort normal on nasal cannula  Cardiovascular: RRR  Musculoskeletal: Trace bilateral ankle edema  Psychiatric: Appropriate affect, cooperative  Neurologic: Speech clear and fluent, confused at times (doesn't remember that we met yesterday)  Skin: No rashes on exposed surfaces    Results Reviewed:  LAB RESULTS:      Lab 22  1510 22  1703   WBC 11.63* 9.69   HEMOGLOBIN 12.2* 12.9*   HEMATOCRIT 39.8 41.3   PLATELETS 210 188   NEUTROS ABS 8.89* 7.05*   IMMATURE GRANS (ABS) 0.17* 0.22*   LYMPHS ABS 1.22 1.30   MONOS ABS 1.08* 0.70   EOS ABS 0.09 0.21   MCV 87.9 87.3   CRP  --  <0.30   PROCALCITONIN  --  0.11   LACTATE  --  1.6   LDH  --  304*         Lab 22  0801 22  2351 22  0854 22  0801 22  1510 22  1703   SODIUM 142  --  145 142 141 140   POTASSIUM 3.8  --  3.8 4.1 4.2 4.2   CHLORIDE 101  --  101 99 101 102   CO2 31.0*  --  35.0* 31.0* 30.0* 27.0   ANION GAP 10.0  --  9.0 12.0 10.0 11.0   BUN 42*  --  36* 28* 21 14   CREATININE 1.40*  --  1.41* 1.36* 1.28* 1.15   EGFR 49.6*  --  49.1* 51.3* 55.2* 62.8   GLUCOSE 94  --  147* 138* 127* 277*   CALCIUM 9.6  --  9.5 9.1 10.1 9.2    MAGNESIUM  --  2.1  --   --  2.0  --    HEMOGLOBIN A1C  --   --   --   --  9.10*  --    TSH  --   --   --   --  1.580  --          Lab 06/02/22  1703   TOTAL PROTEIN 7.3   ALBUMIN 4.20   GLOBULIN 3.1   ALT (SGPT) 48*   AST (SGOT) 48*   BILIRUBIN 1.2   ALK PHOS 227*         Lab 06/03/22  1510 06/02/22  2131 06/02/22  1703   PROBNP  --   --  5,664.0*   TROPONIN T 0.052* 0.047* 0.041*         Lab 06/03/22  1510   CHOLESTEROL 169   LDL CHOL 78   HDL CHOL 74*   TRIGLYCERIDES 97         Lab 06/02/22  1703   FERRITIN 167.30         Lab 06/02/22  2351   PH, ARTERIAL 7.359   PCO2, ARTERIAL 46.0*   PO2 ART 87.7   FIO2 36   HCO3 ART 25.9   BASE EXCESS ART 0.0   CARBOXYHEMOGLOBIN 0.9     Brief Urine Lab Results  (Last result in the past 365 days)      Color   Clarity   Blood   Leuk Est   Nitrite   Protein   CREAT   Urine HCG        12/06/21 0908 Yellow   Clear   Negative   Negative   Negative   Negative                 Microbiology Results Abnormal     Procedure Component Value - Date/Time    COVID PRE-OP / PRE-PROCEDURE SCREENING ORDER (NO ISOLATION) - Swab, Nasopharynx [[id number]]  (Normal) Collected: 06/02/22 1650    Lab Status: Final result Specimen: Swab from Nasopharynx Updated: 06/02/22 1732    Narrative:      The following orders were created for panel order COVID PRE-OP / PRE-PROCEDURE SCREENING ORDER (NO ISOLATION) - Swab, Nasopharynx.  Procedure                               Abnormality         Status                     ---------                               -----------         ------                     COVID-19 and FLU A/B PCR...[[id number]]  Normal              Final result                 Please view results for these tests on the individual orders.    COVID-19 and FLU A/B PCR - Swab, Nasopharynx [[id number]]  (Normal) Collected: 06/02/22 1650    Lab Status: Final result Specimen: Swab from Nasopharynx Updated: 06/02/22 1732     COVID19 Not Detected     Influenza A PCR Not Detected     Influenza B PCR Not  Detected    Narrative:      Fact sheet for providers: https://www.fda.gov/media/443973/download    Fact sheet for patients: https://www.fda.gov/media/884571/download    Test performed by PCR.          No radiology results from the last 24 hrs    Results for orders placed during the hospital encounter of 06/02/22    Adult Transthoracic Echo Complete w/ Color, Spectral and Contrast if necessary per protocol    Interpretation Summary  · There is a left pleural effusion. There is a right pleural effusion.  · Left ventricular wall thickness is consistent with hypertrophy.  · The right ventricular cavity is mildly dilated.  · Left atrial volume is mildly increased.  · The right atrial cavity is dilated.  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  · There is a bioprosthetic aortic valve present.  · Peak velocity of the flow distal to the aortic valve is 105.1 cm/s. Aortic valve mean pressure gradient is 3 mmHg.  · Aortic valve area is 2.9 cm2.  · Moderate aortic valve regurgitation is present.  · Moderate tricuspid valve regurgitation is present.  · Estimated right ventricular systolic pressure from tricuspid regurgitation is moderately elevated (45-55 mmHg). Calculated right ventricular systolic pressure from tricuspid regurgitation is 52 mmHg.  · Ejection fraction is low normal      I have reviewed the medications:  Scheduled Meds:allopurinol, 300 mg, Oral, Daily  amLODIPine, 5 mg, Oral, Daily  empagliflozin, 10 mg, Oral, Daily  fluticasone, 2 spray, Nasal, Daily  furosemide, 40 mg, Oral, Daily  heparin (porcine), 5,000 Units, Subcutaneous, Q8H  insulin detemir, 20 Units, Subcutaneous, Nightly  insulin lispro, 0-9 Units, Subcutaneous, TID AC  metoprolol succinate XL, 100 mg, Oral, Daily  pharmacy consult - MTM, , Does not apply, Daily  sacubitril-valsartan, 1 tablet, Oral, Q12H  sodium chloride, 10 mL, Intravenous, Q12H      Continuous Infusions:   PRN Meds:.•  acetaminophen **OR** acetaminophen **OR**  acetaminophen  •  senna-docusate sodium **AND** polyethylene glycol **AND** bisacodyl **AND** bisacodyl  •  dextrose  •  dextrose  •  glucagon (human recombinant)  •  HYDROcodone-acetaminophen  •  melatonin  •  sodium chloride  •  sodium chloride    Assessment & Plan   Assessment & Plan     Active Hospital Problems    Diagnosis  POA   • **Acute on chronic respiratory failure with hypoxia (HCC) [J96.21]  Unknown   • Elevated troponin [R77.8]  Unknown   • Cirrhosis of liver (HCC) [K74.60]  Unknown   • Elevated transaminase level [R74.01]  Unknown   • Pleural effusion, bilateral [J90]  Unknown   • Normocytic anemia [D64.9]  Unknown   • History of pulmonary embolism [Z86.711]  Unknown   • Acute on chronic congestive heart failure, unspecified heart failure type (HCC) [I50.9]  Yes   • 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        Brief Hospital Course to date:  Blane Dietz is a 86 y.o. 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    This patient's problems and plans were partially entered by my partner and updated as appropriate by me 06/08/22.    Acute on chronic hypoxic respiratory  failure  Acute exacerbation of diastolic CHF  Bilateral pleural effusions  Aortic stenosis s/p ROSS procedure  Elevated troponin  HUSSAIN on CPAP  --Likely due to medication non-adherence  --S/P IV diuresis  --Cardiology following, transitioned to PO lasix, started entresto. Held this am due to hypotension  --Continue home jardiance  -- Cr stable on last check, refuses repeat labs today     Elevated transaminase levels  Cirrhosis  Liver lesion  -Imaging concerning for cirrhosis with lesion in left lobe of liver  --Will need additional imaging with MRI or CT liver mass protocol as an outpatient  --Outpatient GI follow up-will place referral at discharge.  Discussed with patient.     History of pulmonary embolism  -Completed 1 year of Eliquis therapy.  Per Dr. Ramon's last note, patient was instructed to discontinue Eliquis in March.    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 Ramon outpatient     Diabetes mellitus type 2  -Has not been taking insulin for over 10 days.  -Contininue Levemir 20 units nightly with moderate SSI, jardiance  -Last A1c was 8.8%    DVT prophylaxis:  Medical DVT prophylaxis orders are present. Will order ZHENG (Cards had restarted Eliquis, however, I see no indication for this any longer, so d/c'd)       AM-PAC 6 Clicks Score (PT): 19 (06/07/22 0900)    Disposition: I expect the patient to be discharged pending rehab placement.    CODE STATUS:   Code Status and Medical Interventions:   Ordered at: 06/03/22 0004     Level Of Support Discussed With:    Patient     Code Status (Patient has no pulse and is not breathing):    CPR (Attempt to Resuscitate)     Medical Interventions (Patient has pulse or is breathing):    Full Support       Ada Gautam MD  06/08/22

## 2022-06-08 NOTE — CASE MANAGEMENT/SOCIAL WORK
Continued Stay Note  [facility]     Patient Name: Blane Dietz  MRN: [id number]  Today's Date: 6/8/2022    Admit Date: 6/2/2022     Discharge Plan     Row Name 06/08/22 1348       Plan    Plan SNF in [place]    Patient/Family in Agreement with Plan yes    Plan Comments Spoke to patient at bedside and daughter by phone. They are now agreeable to any SNF in [place] or surrounding [facility]. CM faxed referrals to [facility], [place], [facility] and [facility]. Spoke with Vanna at [facility] and their clinical people are looking at the patient. CM will continue to follow.    Final Discharge Disposition Code 03 - skilled nursing facility (SNF)               Discharge Codes    No documentation.               Expected Discharge Date and Time     Expected Discharge Date Expected Discharge Time    Jun 7, 2022             Jesus Saunders RN

## 2022-06-08 NOTE — DISCHARGE PLACEMENT REQUEST
"Rc Blandon (86 y.o. Male)   Minh Saunders, RN Case Manager  [phone]  Looking for short term rehab            Date of Birth   1935    Social Security Number       Address   [address]  [facility] [address]    Home Phone   [phone]    MRN   [id number]       Muslim   [employer]    Marital Status   Single                            Admission Date   22    Admission Type   Emergency    Admitting Provider   Ada Gautam MD    Attending Provider   Ada Gautam MD    Department, Room/Bed   [facility] 3E, S336/1       Discharge Date       Discharge Disposition       Discharge Destination                               Attending Provider: Ada Gautam MD    Allergies: Ampicillin, Medrol [Methylprednisolone], Myrbetriq [Mirabegron], Penicillins, Bee Venom    Isolation: None   Infection: None   Code Status: CPR   Advance Care Planning Activity    Ht: 167.6 cm (65.98\")   Wt: 84.8 kg (186 lb 15.2 oz)    Admission Cmt: None   Principal Problem: Acute on chronic respiratory failure with hypoxia (HCC) [J96.21]                 Active Insurance as of 2022     Primary Coverage     Payor Plan Insurance Group Employer/Plan Group    HUMANA MEDICARE REPLACEMENT HUMANA MEDICARE REPLACEMENT [id number]     Payor Plan Address Payor Plan Phone Number Payor Plan Fax Number Effective Dates    [address] [address] [phone]  2018 - None Entered    [facility] [address]       Subscriber Name Subscriber Birth Date Member ID       RC BLANDON 1935 [id number]                 Emergency Contacts      (Rel.) Home Phone Work Phone Mobile Phone    Urban Blandon (Power of ) [phone] -- [phone]    CLIFFORD WHITAKER (Sister) [phone] -- --               History & Physical      Courtney Ozuna DO at 22 0029              [facility] Medicine Services  HISTORY AND PHYSICAL    Patient Name: Rc Blandon  : " 1935  MRN: [id number]  Primary Care Physician: David Em MD  Date of admission: 6/2/2022      Subjective   Subjective     Chief Complaint:  Shortness of breath    HPI:  Blane Dietz is a 86 y.o. 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  Daughter at bedside assists with HPI.  She complains of shortness of breath which has been ongoing for the past 1 month.  Over the past several days his symptoms have became much more severe, breathing much more labored.  He has been wearing his home as needed oxygen continuous and has been wearing his CPAP during the day due to difficulty breathing. He reports increased weakness and fatigue, says he has been too weak to get out of bed to eat for the past several days.  He has had increased edema to his left lower extremity, mild cough.  He denies fever, malaise, orthopnea, dysuria, vomiting, abdominal pain.  He has chronic diarrhea which she says is at baseline.  Due to progressive decline, daughter encouraged him to come to the ED for further evaluation.    Daughter says patient has having a difficult time managing home medications and daughter is currently attempting to get him into assisted living.  But, he has not been taking home meds recently.  He says he has not taken insulin for at least 10 days.    Review of Systems   Constitutional: Positive for activity change, appetite change and fatigue. Negative for chills and fever.   HENT: Negative.    Eyes: Negative.    Respiratory: Positive for cough and shortness of breath.     Cardiovascular: Positive for leg swelling. Negative for chest pain.   Gastrointestinal: Positive for diarrhea. Negative for abdominal pain, nausea and vomiting.   Endocrine: Negative.    Genitourinary: Negative.    Musculoskeletal: Negative.  Negative for myalgias.   Skin: Negative.    Allergic/Immunologic: Negative.    Neurological: Positive for weakness.   Hematological: Negative.    Psychiatric/Behavioral: Negative.         All other systems reviewed and are negative.     Personal History     Past Medical History:   Diagnosis Date   • Aortic stenosis     status post ROSS procedure, remote, with December 2015 echocardiography revealing normal aortic and pulmonary valve function, normal ejection fraction and mild to moderate MR   • Arthritis    • Bilateral impacted cerumen 11/23/2016   • Bronchitis    • Chronic gout of right foot 6/13/2016    Impression: 03/08/2016 - stable.;    • COVID-19 vaccine series completed 05/12/2021    [place] 2nd dose 3/12/21.   • Depression    • Diabetes mellitus (HCC)    • Diverticulitis    • Exogenous obesity    • Gout    • Heart murmur    • History of vitamin D deficiency    • Hypercholesteremia 8/11/2016   • Hyperlipidemia    • Hypertension    • Kidney lesion    • Malignant neoplasm of prostate (HCC)    • Morbid obesity (HCC) 8/11/2016   • Pneumonia 05/12/2021   • Primary osteoarthritis involving multiple joints 6/13/2016    Impression: 03/08/2016 - stable;    • Pulmonary embolism (HCC)    • Sleep apnea 05/12/2021    C-Pap   • Uncontrolled type 2 diabetes mellitus with hyperglycemia (HCC) 6/13/2016    Impression: 03/08/2016 - seeing Endo .;    • Vertigo          Oncology Problem List:  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)   (12/28/2021; Status: Active)  Prostate cancer (HCC) (08/11/2016; Status: Active)    Oncology/Hematology History   Prostate cancer (HCC)   8/11/2016 Initial Diagnosis    Prostate cancer (CMS/HCC)     Diffuse large B-cell lymphoma  of solid organ excluding spleen (HCC)   12/28/2021 Initial Diagnosis    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)     12/28/2021 Cancer Staged    Staging form: Hodgkin And Non-Hodgkin Lymphoma, AJCC 8th Edition  - Clinical stage from 12/28/2021: Stage IE (Diffuse large B-cell lymphoma) - Signed by Shannan Ramon MD on 12/28/2021 1/21/2022 -  Chemotherapy    OP CENTRAL VENOUS ACCESS DEVICE ACCESS, CARE, AND MAINTENANCE (CVAD)         Past Surgical History:   Procedure Laterality Date   • CARDIAC VALVE REPLACEMENT  05/12/2021    Ross procedure 22 years ago   • COLON SURGERY     • COLONOSCOPY     • COLOSTOMY  1999   • COLOSTOMY REVISION     • CYSTOSCOPY N/A 12/7/2021    Procedure: CYSTOSCOPY FLEXIBLE;  Surgeon: José Miguel Villafuerte Jr., MD;  Location:  [facility];  Service: Urology;  Laterality: N/A;   • EXPLORATORY LAPAROTOMY      With Tissue Removal   • LIPOMA EXCISION     • MOHS SURGERY     • NEPHROURETERECTOMY Right 12/7/2021    Procedure: RIGHT NEPHROURETERECTOMY LAPAROSCOPIC WITH DAVINCI ROBOT;  Surgeon: José Miguel Villafuerte Jr., MD;  Location:  [facility];  Service: Robotics - DaVinci;  Laterality: Right;   • OTHER SURGICAL HISTORY      Porcine Valve   • PROSTATE SURGERY     • PROSTATECTOMY  2000     History of Prostatectomy Perineal Radical   • SKIN CANCER EXCISION      from head and lip   • TONSILLECTOMY         Family History:  family history includes Breast cancer in an other family member; Cancer in his mother and another family member; Diabetes in his mother and another family member; Heart disease in his father; Hypertension in an other family member; Lung cancer in his mother; Migraines in an other family member; Obesity in an other family member. Otherwise pertinent FHx was reviewed and unremarkable.     Social History:  reports that he has never smoked. He has never used smokeless tobacco. He reports that he does not drink alcohol and does not use drugs.  Social History      Social History Narrative    Right hand dominant       Medications:  Available home medication information reviewed.  (Not in a hospital admission)      Allergies   Allergen Reactions   • Ampicillin Anaphylaxis   • Medrol [Methylprednisolone] Unknown - High Severity     Made his blood sugar get really high, can't take this   • Myrbetriq [Mirabegron] Unknown - High Severity     High BP   • Penicillins Anaphylaxis   • Bee Venom Swelling       Objective   Objective     Vital Signs:   Temp:  [97.5 °F (36.4 °C)] 97.5 °F (36.4 °C)  Heart Rate:  [] 81  Resp:  [16] 16  BP: (144-177)/() 144/86  Flow (L/min):  [2-4] 4       Physical Exam   Constitutional: Awake, alert  Eyes: PERRLA, sclerae anicteric, no conjunctival injection  HENT: NCAT, mucous membranes moist  Neck: Supple, no thyromegaly, no lymphadenopathy, trachea midline  Respiratory: Poor air movement on the right, inspiratory crackles at left lung base, speaking in short phrases  Cardiovascular: RRR, no murmurs, rubs, or gallops, palpable pedal pulses bilaterally  Gastrointestinal: Positive bowel sounds, soft, nontender, nondistended  Musculoskeletal: No bilateral ankle edema, no clubbing or cyanosis to extremities  Psychiatric: Appropriate affect, cooperative  Neurologic: Oriented x 3, strength symmetric in all extremities, Cranial Nerves grossly intact to confrontation, speech clear  Skin: No rashes      Result Review:  I have personally reviewed the results from the time of this admission to 6/3/2022 01:06 EDT and agree with these findings:  [x]  Laboratory  [x]  Microbiology  [x]  Radiology  [x]  EKG/Telemetry   []  Cardiology/Vascular   []  Pathology  [x]  Old records  []  Other:      LAB RESULTS:      Lab 06/02/22  1703   WBC 9.69   HEMOGLOBIN 12.9*   HEMATOCRIT 41.3   PLATELETS 188   NEUTROS ABS 7.05*   IMMATURE GRANS (ABS) 0.22*   LYMPHS ABS 1.30   MONOS ABS 0.70   EOS ABS 0.21   MCV 87.3   CRP <0.30   PROCALCITONIN 0.11   LACTATE 1.6   LDH  304*         Lab 06/02/22  1703   SODIUM 140   POTASSIUM 4.2   CHLORIDE 102   CO2 27.0   ANION GAP 11.0   BUN 14   CREATININE 1.15   EGFR 62.8   GLUCOSE 277*   CALCIUM 9.2         Lab 06/02/22  1703   TOTAL PROTEIN 7.3   ALBUMIN 4.20   GLOBULIN 3.1   ALT (SGPT) 48*   AST (SGOT) 48*   BILIRUBIN 1.2   ALK PHOS 227*         Lab 06/02/22  2131 06/02/22  1703   PROBNP  --  5,664.0*   TROPONIN T 0.047* 0.041*             Lab 06/02/22  1703   FERRITIN 167.30         Lab 06/02/22  2351   PH, ARTERIAL 7.359   PCO2, ARTERIAL 46.0*   PO2 ART 87.7   FIO2 36   HCO3 ART 25.9   BASE EXCESS ART 0.0   CARBOXYHEMOGLOBIN 0.9     UA    Urinalysis 12/6/21   Specific Gravity, UA 1.007   Ketones, UA Negative   Blood, UA Negative   Leukocytes, UA Negative   Nitrite, UA Negative             Microbiology Results (last 10 days)     Procedure Component Value - Date/Time    COVID PRE-OP / PRE-PROCEDURE SCREENING ORDER (NO ISOLATION) - Swab, Nasopharynx [[id number]]  (Normal) Collected: 06/02/22 1650    Lab Status: Final result Specimen: Swab from Nasopharynx Updated: 06/02/22 1732    Narrative:      The following orders were created for panel order COVID PRE-OP / PRE-PROCEDURE SCREENING ORDER (NO ISOLATION) - Swab, Nasopharynx.  Procedure                               Abnormality         Status                     ---------                               -----------         ------                     COVID-19 and FLU A/B PCR...[[id number]]  Normal              Final result                 Please view results for these tests on the individual orders.    COVID-19 and FLU A/B PCR - Swab, Nasopharynx [[id number]]  (Normal) Collected: 06/02/22 1650    Lab Status: Final result Specimen: Swab from Nasopharynx Updated: 06/02/22 1732     COVID19 Not Detected     Influenza A PCR Not Detected     Influenza B PCR Not Detected    Narrative:      Fact sheet for providers: https://www.fda.gov/media/074184/download    Fact sheet for patients:  https://www.fda.gov/media/067735/download    Test performed by PCR.          XR Chest 1 View    Result Date: 6/2/2022  XR CHEST 1 VW-  Date of Exam: 6/2/2022 4:44 PM  Indication: SOA triage protocol.  Comparison:?12/6/2021  Technique:?A single view of the chest was obtained.  FINDINGS:  ?Patient status post median sternotomy.  Cardiomegaly is stable. Pulmonary vessels are indistinct consistent with pulmonary vascular congestion.  There is hazy perihilar and bibasilar airspace disease favored to be due to pulmonary edema.  There are new small bilateral pleural effusions.        Impression:   1.  Cardiomegaly with pulmonary vascular congestion and bibasilar and perihilar airspace disease favored to be due to pulmonary edema. 2.  Small bilateral pleural effusions.   This report was finalized on 6/2/2022 5:05 PM by Long Ayala MD.      CT Angiogram Chest    Result Date: 6/2/2022  DATE OF EXAM: 6/2/2022 6:33 PM  PROCEDURE: CT ANGIOGRAM CHEST-  INDICATIONS: SOA w/ hx PE and noncompliant with eliquis  COMPARISON: 03/28/2021  TECHNIQUE: Contiguous axial imaging was obtained from the thoracic inlet through the upper abdomen following the intravenous administration of 80 mL of Isovue 370. Reconstructed coronal and sagittal images were also obtained. Automated exposure control and iterative reconstruction methods were used.  The radiation dose reduction device was turned on for each scan per the ALARA (As Low as Reasonably Achievable) protocol.  FINDINGS: Pulmonary arteries: Adequate opacified. Some distortion of peripheral pulmonary arteries due to motion. Within that limitation, no acute emboli demonstrated. Mural calcification of the pulmonary trunk  Mediastinum: Aortic valve and coronary calcifications. No pericardial effusion. No mediastinal adenopathy. Thoracic aorta normal in caliber  Lungs/pleura: Moderate bilateral pleural effusions with secondary atelectasis in the lower lobes. Mild interlobular septal thickening  and peribronchovascular interstitial thickening. Scattered foci of atelectasis in the remaining lungs  Upper abdomen: Nodular contour of the liver  Bones/soft tissues: No acute bony abnormality       Impression:  1. No evidence of acute pulmonary embolism. Mural calcination's of the pulmonary trunk may indicate pulmonary arterial hypertension 2. Findings compatible with interstitial edema and moderate bilateral pleural effusions 3. Cirrhotic morphology of the liver  This report was finalized on 6/2/2022 7:01 PM by Francisco Hummel.        Results for orders placed during the hospital encounter of 01/04/22    Adult Transthoracic Echo Complete W/ Cont if Necessary Per Protocol    Interpretation Summary  · The left ventricular cavity is borderline dilated.  · Left ventricular wall thickness is consistent with mild concentric hypertrophy.  · Estimated left ventricular EF = 52% Left ventricular ejection fraction appears to be 51 - 55%. Left ventricular systolic function is normal.  · Left ventricular diastolic function is consistent with (grade I) impaired relaxation.  · Left atrial volume is moderately increased.  · There is moderate calcification of the aortic valve mainly affecting the non-coronary, left coronary and right coronary cusp(s).  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      Assessment & Plan   Assessment & Plan     Active Hospital Problems    Diagnosis  POA   • **Acute on chronic respiratory failure with hypoxia (HCC) [J96.21]  Unknown   • Elevated troponin [R77.8]  Unknown   • Cirrhosis of liver (HCC) [K74.60]  Unknown   • Elevated transaminase level [R74.01]  Unknown   • Pleural effusion, bilateral [J90]  Unknown   • Normocytic anemia [D64.9]  Unknown   • History of pulmonary embolism [Z86.711]  Unknown   • Acute on chronic congestive heart failure, unspecified heart failure type (HCC) [I50.9]  Yes   • 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) [C83.39]  Yes   • s/p  right nepheroureterectomy and adrenalectomy  [E89.6]  Not Applicable   • Elevated LFTs [R79.89]  Yes   • HLD (hyperlipidemia) [E78.5]  Yes   • S/P AVR [Z95.2]  Not Applicable   • Sleep apnea [G47.30]  Yes   • Aortic stenosis [I35.0]  Yes     status post ROSS procedure, remote, with December 2015 echocardiography revealing normal aortic and pulmonary valve function, normal ejection fraction and mild to moderate MR     • Prostate cancer (HCC) [C61]  Yes              • Essential hypertension [I10]  Yes     Impression: 03/08/2016 - Reviewed outside labs from Jan. Cr 1.3. K 4.2 and sodium 144. Normal cbc.;      • Uncontrolled type 2 diabetes mellitus with hyperglycemia (HCC) [E11.65]  Yes     Impression: 03/08/2016 - seeing Endo .;      Blane Dietz is a 86 y.o. 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    Acute on chronic hypoxic respiratory failure  Acute exacerbation of CHF  Bilateral pleural effusions  Aortic stenosis s/p ROSS procedure  Elevated troponin  HUSSAIN on CPAP  - Patient satting 88% on 2 L, maintaining sats in the mid 90s on 4 L currently  - Given Bumex 0.5 mg IV in ED, will give additional Bumex 1 mg IV twice daily x2 doses  - Defer further IV diuresis to day team  - Nitro drip  -BiPAP  - Strict intake and output, daily weights  - TTE for a.m.  - CTA negative for PE, consistent with volume overload  - May need thoracentesis  - Follows with Dr. Oconnor with cardiology, consult for a.m.  - Elevated troponin, likely demand ischemia.  Trend  - FLP, hemoglobin A1c for a.m.  - Patient  has not been taking home meds recently  - PT/OT  - Case management consult for possible rehab at discharge    Elevated transaminase levels  Cirrhosis  - CT concerning for cirrhosis of the liver, no known history of cirrhosis  - Acute hepatitis panel  - Possibly secondary to CHF  - Liver ultrasound for a.m.    History of pulmonary embolism  - Completed 1 year of Eliquis therapy.  Per Dr. Ramon's last note, patient was instructed to discontinue Eliquis in March.  Patient denies knowledge of this.  However he has not been taking Eliquis recently.  - No PE on CTA  -L>R LE edema, left venous doppler for a.m.    Diffuse large B-cell lymphoma of the right kidney  Prostate cancer s/p prostatectomy  - S/p right nephrectomy, R-CHOP 3 cycles  -Follows with Dr. Ramon outpatient, consider consult    Diabetes mellitus type 2  - Has not been taking insulin for over 10 days.  -Start conservatively with Levemir 20 units nightly with moderate SSI with Accu-Cheks  - Hemoglobin A1c for a.m.    Normocytic anemia  - Stable from prior studies  - A.m. labs    DVT prophylaxis: Lovenox      CODE STATUS: Full code, desires initial resuscitation but would not want prolonged life support  Code Status and Medical Interventions:   Ordered at: 06/03/22 0004     Level Of Support Discussed With:    Patient     Code Status (Patient has no pulse and is not breathing):    CPR (Attempt to Resuscitate)     Medical Interventions (Patient has pulse or is breathing):    Full Support         Courtney Ozuna DO  06/03/22      Electronically signed by Courtney Ozuna DO at 06/03/22 0123       Vital Signs (last day)     Date/Time Temp Temp src Pulse Resp BP Patient Position SpO2    06/08/22 0645 -- -- 62 -- -- -- 93    06/08/22 0428 -- -- 59 -- -- -- --    06/08/22 0425 -- -- 48 -- -- -- --    06/08/22 0335 97.3 (36.3) Oral 64 16 102/67 Lying 94    06/08/22 0251 -- -- 57 -- -- -- --    06/08/22 0211 -- -- 74 -- -- -- --    06/08/22 0039 -- -- 67 -- -- -- --     06/07/22 2342 97 (36.1) Oral 79 18 118/85 Lying 92    06/07/22 2200 -- -- 70 -- -- -- --    06/07/22 2148 -- -- 66 -- 125/62 Lying --    06/07/22 2144 97.2 (36.2) Oral 71 18 99/60 Sitting 94    06/07/22 2000 -- -- 67 -- -- -- --    06/07/22 1615 -- -- 66 -- -- -- --    06/07/22 1535 96.8 (36) Axillary 69 18 94/56 Lying 93    06/07/22 1311 -- -- 74 -- 119/89 Lying --    06/07/22 1215 -- -- 70 -- -- -- --    06/07/22 1118 96.6 (35.9) Axillary 62 18 131/78 Lying 93    06/07/22 1034 -- -- 65 -- -- -- --    06/07/22 0845 -- -- 78 -- 93/73 Sitting --    06/07/22 0830 -- -- 64 -- -- -- --    06/07/22 0801 97.8 (36.6) Oral 63 16 121/80 Lying 96    06/07/22 0322 98 (36.7) Oral 65 18 117/66 Lying 97            Current Facility-Administered Medications   Medication Dose Route Frequency Provider Last Rate Last Admin   • acetaminophen (TYLENOL) tablet 650 mg  650 mg Oral Q4H PRN Laith, Courtney G, DO        Or   • acetaminophen (TYLENOL) 160 MG/5ML solution 650 mg  650 mg Oral Q4H PRN Laith, Courtney G, DO        Or   • acetaminophen (TYLENOL) suppository 650 mg  650 mg Rectal Q4H PRN Laith, Courtney G, DO       • allopurinol (ZYLOPRIM) tablet 300 mg  300 mg Oral Daily Laith, Courtney G, DO   300 mg at 06/07/22 0855   • amLODIPine (NORVASC) tablet 5 mg  5 mg Oral Daily Laith, Courtney G, DO   5 mg at 06/07/22 1314   • sennosides-docusate (PERICOLACE) 8.6-50 MG per tablet 2 tablet  2 tablet Oral BID PRN Kirstin Estevez MD        And   • polyethylene glycol (MIRALAX) packet 17 g  17 g Oral Daily PRN Kirstin Estevez MD        And   • bisacodyl (DULCOLAX) EC tablet 5 mg  5 mg Oral Daily PRN Kristin Estevez MD        And   • bisacodyl (DULCOLAX) suppository 10 mg  10 mg Rectal Daily PRN Kirstin sEtevez MD       • dextrose (D50W) (25 g/50 mL) IV injection 25 g  25 g Intravenous Q15 Min PRN Courtney Ozuna G, DO       • dextrose (GLUTOSE) oral gel 15 g  15 g Oral Q15 Min PRN Courtney Ozuna G, DO       • empagliflozin (JARDIANCE) tablet 10 mg  10 mg  Oral Daily Kirstin Estevez MD   10 mg at 06/07/22 0856   • fluticasone (FLONASE) 50 MCG/ACT nasal spray 2 spray  2 spray Nasal Daily Courtney Ozuna G, DO   2 spray at 06/07/22 0856   • furosemide (LASIX) tablet 40 mg  40 mg Oral Daily Ha Toussaint MD   40 mg at 06/07/22 1314   • glucagon (human recombinant) (GLUCAGEN DIAGNOSTIC) injection 1 mg  1 mg Intramuscular Q15 Min PRN LaithMary tylersie G, DO       • heparin (porcine) 5000 UNIT/ML injection 5,000 Units  5,000 Units Subcutaneous Q8H Ada Gautam MD   5,000 Units at 06/08/22 0520   • HYDROcodone-acetaminophen (NORCO) 7.5-325 MG per tablet 1 tablet  1 tablet Oral Q6H PRN LaithMary tylersie G, DO       • insulin detemir (LEVEMIR) injection 20 Units  20 Units Subcutaneous Nightly LaithCourtney tyler G, DO   20 Units at 06/07/22 2144   • Insulin Lispro (humaLOG) injection 0-9 Units  0-9 Units Subcutaneous TID AC Courtney Ozuna G, DO   4 Units at 06/07/22 1729   • melatonin tablet 5 mg  5 mg Oral Nightly PRN LaithMary tylersie G, DO       • metoprolol succinate XL (TOPROL-XL) 24 hr tablet 100 mg  100 mg Oral Daily LaithMary tylersie G, DO   100 mg at 06/07/22 1314   • Pharmacy Consult - MTM   Does not apply Daily Ellen Butler, PharmD       • sacubitril-valsartan (ENTRESTO) 24-26 MG tablet 1 tablet  1 tablet Oral Q12H Ginny Cramer PA   1 tablet at 06/07/22 1314   • sodium chloride 0.9 % flush 10 mL  10 mL Intravenous PRN LaithMary tylersie G, DO       • sodium chloride 0.9 % flush 10 mL  10 mL Intravenous Q12H LaithMary tylersie G, DO   10 mL at 06/06/22 0904   • sodium chloride 0.9 % flush 10 mL  10 mL Intravenous PRN LaithMary tylersie G, DO            Physician Progress Notes (last 24 hours)      Ha Toussaint MD at 06/07/22 0942        Blane PONCE J Luis  1935  S336/1      Patient is awaiting placement.  Cardiology will see PRN.  Pt has previously scheduled FU appt with Dr. Toussaint on August 10, 2022 at 3 p.m.    Patient should DC on the following cardiac medications:   Norvasc 5mg daily, Eliquis 5mg twice daily, Lasix 40mg daily, Toprol XL 100mg daily, Entresto 24/26mg twice daily.    Ginny Cramer PA-C working with Dr. Ha Toussaint      Electronically signed by Ha Toussaint MD at 22 1044          Physical Therapy Notes (most recent note)      Sophia Pina, PT at 22 9936  Version 1 of 1         Patient Name: Blane Dietz  : 1935    MRN: [id number]                              Today's Date: 6/3/2022       Admit Date: 2022    Visit Dx:     ICD-10-CM ICD-9-CM   1. Acute on chronic congestive heart failure, unspecified heart failure type (HCC)  I50.9 428.0   2. Hypoxemia requiring supplemental oxygen  R09.02 799.02    Z99.81    3. Diabetes mellitus type 2 in obese (HCC)  E11.69 250.00    E66.9 278.00   4. Elevated blood pressure reading with diagnosis of hypertension  I10 401.9   5. 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)   • PICC (peripherally inserted central catheter) flush   • Acute on chronic congestive heart failure, unspecified heart failure type (HCC)   • Elevated troponin   • Cirrhosis of liver (HCC)   • Elevated transaminase level   • Pleural effusion, bilateral   • Normocytic anemia   • History of pulmonary embolism     Past Medical History:   Diagnosis Date   • Aortic stenosis     status post ROSS procedure, remote, with 2015 echocardiography  revealing normal aortic and pulmonary valve function, normal ejection fraction and mild to moderate MR   • Arthritis    • Bilateral impacted cerumen 11/23/2016   • Bronchitis    • Chronic gout of right foot 6/13/2016    Impression: 03/08/2016 - stable.;    • COVID-19 vaccine series completed 05/12/2021    Maderna 2nd dose 3/12/21.   • Depression    • Diabetes mellitus (HCC)    • Diverticulitis    • Exogenous obesity    • Gout    • Heart murmur    • History of vitamin D deficiency    • Hypercholesteremia 8/11/2016   • Hyperlipidemia    • Hypertension    • Kidney lesion    • Malignant neoplasm of prostate (HCC)    • Morbid obesity (HCC) 8/11/2016   • Pneumonia 05/12/2021   • Primary osteoarthritis involving multiple joints 6/13/2016    Impression: 03/08/2016 - stable;    • Pulmonary embolism (HCC)    • Sleep apnea 05/12/2021    C-Pap   • Uncontrolled type 2 diabetes mellitus with hyperglycemia (HCC) 6/13/2016    Impression: 03/08/2016 - seeing Endo .;    • Vertigo      Past Surgical History:   Procedure Laterality Date   • CARDIAC VALVE REPLACEMENT  05/12/2021    Ross procedure 22 years ago   • COLON SURGERY     • COLONOSCOPY     • COLOSTOMY  1999   • COLOSTOMY REVISION     • CYSTOSCOPY N/A 12/7/2021    Procedure: CYSTOSCOPY FLEXIBLE;  Surgeon: José Miguel Villafuerte Jr., MD;  Location:  [facility];  Service: Urology;  Laterality: N/A;   • EXPLORATORY LAPAROTOMY      With Tissue Removal   • LIPOMA EXCISION     • MOHS SURGERY     • NEPHROURETERECTOMY Right 12/7/2021    Procedure: RIGHT NEPHROURETERECTOMY LAPAROSCOPIC WITH DAVINCI ROBOT;  Surgeon: José Miguel Villafuerte Jr., MD;  Location:  [facility];  Service: Robotics - DaVinci;  Laterality: Right;   • OTHER SURGICAL HISTORY      Porcine Valve   • PROSTATE SURGERY     • PROSTATECTOMY  2000     History of Prostatectomy Perineal Radical   • SKIN CANCER EXCISION      from head and lip   • TONSILLECTOMY        General Information     Row Name 06/03/22 7279          Physical  Therapy Time and Intention    Document Type evaluation  -     Mode of Treatment physical therapy  -     Row Name 06/03/22 1535          General Information    Patient Profile Reviewed yes  -SS     Prior Level of Function independent:;all household mobility;gait;transfer;bed mobility;driving  RW, cane PRN, O2 at baseline  -     Existing Precautions/Restrictions fall;oxygen therapy device and L/min  -     Barriers to Rehab medically complex  -     Row Name 06/03/22 1535          Living Environment    People in Home alone;other (see comments)  anticipates moving into [facility] in July 2022  -     Row Name 06/03/22 1535          Home Main Entrance    Number of Stairs, Main Entrance other (see comments)  ramp  -     Row Name 06/03/22 1535          Stairs Within Home, Primary    Number of Stairs, Within Home, Primary none  -     Row Name 06/03/22 1535          Cognition    Orientation Status (Cognition) oriented x 4  -     Row Name 06/03/22 1535          Safety Issues, Functional Mobility    Safety Issues Affecting Function (Mobility) awareness of need for assistance;insight into deficits/self-awareness;positioning of assistive device;problem-solving;safety precaution awareness;safety precautions follow-through/compliance;sequencing abilities  -     Impairments Affecting Function (Mobility) balance;endurance/activity tolerance;shortness of breath;strength;postural/trunk control  -           User Key  (r) = Recorded By, (t) = Taken By, (c) = Cosigned By    Initials Name Provider Type     Sophia Pina PT Physical Therapist               Mobility     Row Name 06/03/22 1537          Bed Mobility    Comment, (Bed Mobility) up in chair  -     Row Name 06/03/22 1537          Sit-Stand Transfer    Sit-Stand [place] (Transfers) contact guard;verbal cues  -     Assistive Device (Sit-Stand Transfers) walker, front-wheeled  -     Comment, (Sit-Stand Transfer) VC for hand placement, appropriate  alignment, lowering with eccentric control  -     Row Name 06/03/22 1537          Gait/Stairs (Locomotion)    [place] Level (Gait) contact guard;verbal cues  -     Assistive Device (Gait) walker, front-wheeled  -     Distance in Feet (Gait) 40  -     Deviations/Abnormal Patterns (Gait) bilateral deviations;binta decreased;gait speed decreased;stride length decreased  -     Bilateral Gait Deviations forward flexed posture;heel strike decreased  -     Comment, (Gait/Stairs) Pt. ambulated with a step through gait pattern. VC for upright posture, safety recommendations. Gait limited by fatigue. O2 desat to 88% on 2L nasal cannula.  -           User Key  (r) = Recorded By, (t) = Taken By, (c) = Cosigned By    Initials Name Provider Type     Sophia Pina, PT Physical Therapist               Obj/Interventions     Row Name 06/03/22 1543          Range of Motion Comprehensive    General Range of Motion bilateral lower extremity ROM WFL  -     Row Name 06/03/22 1543          Strength Comprehensive (MMT)    Comment, General Manual Muscle Testing (MMT) Assessment BLE gross 4/5  -     Row Name 06/03/22 1543          Motor Skills    Motor Skills functional endurance  -     Functional Endurance moderate impairment  -     Row Name 06/03/22 1543          Balance    Balance Assessment sitting static balance;sitting dynamic balance;sit to stand dynamic balance;standing static balance;standing dynamic balance  -     Static Sitting Balance independent  -     Dynamic Sitting Balance supervision  -     Position, Sitting Balance unsupported;sitting in chair  -     Sit to Stand Dynamic Balance contact guard  -     Static Standing Balance contact guard  -     Dynamic Standing Balance contact guard  -     Position/Device Used, Standing Balance supported;walker, front-wheeled  -     Balance Interventions sitting;standing;sit to stand;supported;static;dynamic  -     Row Name 06/03/22 1541           Sensory Assessment (Somatosensory)    Sensory Assessment (Somatosensory) LE sensation intact  -SS           User Key  (r) = Recorded By, (t) = Taken By, (c) = Cosigned By    Initials Name Provider Type    Sophia David PT Physical Therapist               Goals/Plan     Row Name 06/03/22 1546          Bed Mobility Goal 1 (PT)    Activity/Assistive Device (Bed Mobility Goal 1, PT) bed mobility activities, all  -SS     [place] Level/Cues Needed (Bed Mobility Goal 1, PT) independent  -SS     Time Frame (Bed Mobility Goal 1, PT) long term goal (LTG);10 days  -SS     Row Name 06/03/22 1546          Transfer Goal 1 (PT)    Activity/Assistive Device (Transfer Goal 1, PT) sit-to-stand/stand-to-sit;bed-to-chair/chair-to-bed  -SS     [place] Level/Cues Needed (Transfer Goal 1, PT) independent  -SS     Time Frame (Transfer Goal 1, PT) long term goal (LTG);10 days  -SS     Row Name 06/03/22 1546          Gait Training Goal 1 (PT)    Activity/Assistive Device (Gait Training Goal 1, PT) gait (walking locomotion);assistive device use;walker, rolling  -SS     [place] Level (Gait Training Goal 1, PT) modified independence  -SS     Distance (Gait Training Goal 1, PT) 150  -SS     Time Frame (Gait Training Goal 1, PT) long term goal (LTG);10 days  -[facility] Name 06/03/22 1546          Therapy Assessment/Plan (PT)    Planned Therapy Interventions (PT) balance training;bed mobility training;gait training;home exercise program;neuromuscular re-education;patient/family education;postural re-education;ROM (range of motion);strengthening;stretching;transfer training  -SS           User Key  (r) = Recorded By, (t) = Taken By, (c) = Cosigned By    Initials Name Provider Type    Sophia David PT Physical Therapist               Clinical Impression     Row Name 06/03/22 1544          Pain    Pretreatment Pain Rating 0/10 - no pain  -SS     Posttreatment Pain Rating 0/10 - no pain  -SS     Pain Intervention(s)  Repositioned;Ambulation/increased activity  -     Additional Documentation Pain Scale: Numbers Pre/Post-Treatment (Group)  -     Row Name 06/03/22 1544          Plan of Care Review    Plan of Care Reviewed With patient  -     Outcome Evaluation Pt. presents with generalized weakness, balance impairments and decreased activity tolerance affecting his ability to safely participate in functional mobility. He performed sit to stand transfer w/contact guard assist. He ambulated 40' w/front wheeled walker, contact guard assist. Gait limited by fatigue. O2 desat to 88% on 2L nasal cannula. Recommend inpatient rehab upon discharge.  -     Row Name 06/03/22 1544          Therapy Assessment/Plan (PT)    Rehab Potential (PT) good, to achieve stated therapy goals  -     Criteria for Skilled Interventions Met (PT) yes;meets criteria;skilled treatment is necessary  -     Therapy Frequency (PT) daily  -     Row Name 06/03/22 1544          Vital Signs    Pre Systolic BP Rehab 128  -SS     Pre Treatment Diastolic BP 70  -SS     Pretreatment Heart Rate (beats/min) 81  -SS     Posttreatment Heart Rate (beats/min) 83  -SS     Pre SpO2 (%) 91  -SS     O2 Delivery Pre Treatment room air  -     Intra SpO2 (%) 88  -SS     O2 Delivery Intra Treatment nasal cannula  2L  -SS     Post SpO2 (%) 95  -SS     O2 Delivery Post Treatment nasal cannula  2L  -SS     Pre Patient Position Sitting  -     Intra Patient Position Standing  -     Post Patient Position Sitting  -     Row Name 06/03/22 1544          Positioning and Restraints    Pre-Treatment Position sitting in chair/recliner  -     Post Treatment Position chair  -SS     In Chair notified nsg;reclined;call light within reach;encouraged to call for assist;exit alarm on;waffle cushion;legs elevated  -           User Key  (r) = Recorded By, (t) = Taken By, (c) = Cosigned By    Initials Name Provider Type    SS Sophia Pina, PT Physical Therapist               Outcome  Measures     Row Name 06/03/22 1547 06/03/22 0815       How much help from another person do you currently need...    Turning from your back to your side while in flat bed without using bedrails? 3  -SS 4  -EO    Moving from lying on back to sitting on the side of a flat bed without bedrails? 3  -SS 4  -EO    Moving to and from a bed to a chair (including a wheelchair)? 3  -SS 3  -EO    Standing up from a chair using your arms (e.g., wheelchair, bedside chair)? 3  -SS 3  -EO    Climbing 3-5 steps with a railing? 3  -SS 3  -EO    To walk in hospital room? 3  -SS 3  -EO    AM-PAC 6 Clicks Score (PT) 18  -SS 20  -EO    Highest level of mobility 6 --> Walked 10 steps or more  -SS 6 --> Walked 10 steps or more  -EO    Row Name 06/03/22 1547          Functional Assessment    Outcome Measure Options AM-PAC 6 Clicks Basic Mobility (PT)  -           User Key  (r) = Recorded By, (t) = Taken By, (c) = Cosigned By    Initials Name Provider Type    EO Mackenzie Chavez RN Registered Nurse    Sophia David, PT Physical Therapist                             Physical Therapy Education                 Title: PT OT SLP Therapies (In Progress)     Topic: Physical Therapy (In Progress)     Point: Mobility training (Done)     Learning Progress Summary           Patient MARILIN Lara, VU,NR by  at 6/3/2022 1547    Comment: Educated pt. safety/technique with transfers, ambulation, PT POC                   Point: Home exercise program (Not Started)     Learner Progress:  Not documented in this visit.          Point: Body mechanics (Done)     Learning Progress Summary           Patient MARILIN Lara, VU,NR by SS at 6/3/2022 1547    Comment: Educated pt. safety/technique with transfers, ambulation, PT POC                   Point: Precautions (Done)     Learning Progress Summary           Patient MARILIN Lara, VU,NR by SS at 6/3/2022 1547    Comment: Educated pt. safety/technique with transfers, ambulation, PT POC                               User  Key     Initials Effective Dates Name Provider Type Discipline    SS 06/01/21 -  Sophia Pina PT Physical Therapist PT              PT Recommendation and Plan  Planned Therapy Interventions (PT): balance training, bed mobility training, gait training, home exercise program, neuromuscular re-education, patient/family education, postural re-education, ROM (range of motion), strengthening, stretching, transfer training  Plan of Care Reviewed With: patient  Outcome Evaluation: Pt. presents with generalized weakness, balance impairments and decreased activity tolerance affecting his ability to safely participate in functional mobility. He performed sit to stand transfer w/contact guard assist. He ambulated 40' w/front wheeled walker, contact guard assist. Gait limited by fatigue. O2 desat to 88% on 2L nasal cannula. Recommend inpatient rehab upon discharge.     Time Calculation:    PT Charges     Row Name 06/03/22 1548             Time Calculation    Start Time 1456  -SS      Stop Time 1506  -SS      Time Calculation (min) 10 min  -SS      PT Received On 06/03/22  -SS      PT Goal Re-Cert Due Date 06/13/22  -              Time Calculation- PT    Total Timed Code Minutes- PT 10 minute(s)  -SS              Untimed Charges    PT Eval/Re-eval Minutes 50  -SS              Total Minutes    Untimed Charges Total Minutes 50  -SS       Total Minutes 50  -SS            User Key  (r) = Recorded By, (t) = Taken By, (c) = Cosigned By    Initials Name Provider Type     Sophia Pina PT Physical Therapist              Therapy Charges for Today     Code Description Service Date Service Provider Modifiers Qty    18977447965 HC PT EVAL LOW COMPLEXITY 4 6/3/2022 Sophia Pina PT GP 1          PT G-Codes  Outcome Measure Options: AM-PAC 6 Clicks Basic Mobility (PT)  AM-PAC 6 Clicks Score (PT): 18    Sophia Pina PT  6/3/2022      Electronically signed by Sophia Pina PT at 06/03/22 5309          Occupational Therapy Notes  (most recent note)      Olimpia Duggan, OT at 22 0921          Patient Name: Blane Dietz  : 1935    MRN: [id number]                              Today's Date: 2022       Admit Date: 2022    Visit Dx:     ICD-10-CM ICD-9-CM   1. Acute on chronic congestive heart failure, unspecified heart failure type (HCC)  I50.9 428.0   2. Hypoxemia requiring supplemental oxygen  R09.02 799.02    Z99.81    3. Diabetes mellitus type 2 in obese (HCC)  E11.69 250.00    E66.9 278.00   4. Elevated blood pressure reading with diagnosis of hypertension  I10 401.9   5. 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)   • PICC (peripherally inserted central catheter) flush   • Acute on chronic congestive heart failure, unspecified heart failure type (HCC)   • Elevated troponin   • Cirrhosis of liver (HCC)   • Elevated transaminase level   • Pleural effusion, bilateral   • Normocytic anemia   • History of pulmonary embolism     Past Medical History:   Diagnosis Date   • Aortic stenosis     status post ROSS procedure, remote, with 2015 echocardiography revealing normal aortic and pulmonary valve function, normal ejection fraction and mild to moderate MR   • Arthritis    • Bilateral impacted cerumen 2016   • Bronchitis    • Chronic gout of right foot 2016    Impression: 2016 - stable.;    • COVID-19 vaccine series completed  05/12/2021    Maderna 2nd dose 3/12/21.   • Depression    • Diabetes mellitus (HCC)    • Diverticulitis    • Exogenous obesity    • Gout    • Heart murmur    • History of vitamin D deficiency    • Hypercholesteremia 8/11/2016   • Hyperlipidemia    • Hypertension    • Kidney lesion    • Malignant neoplasm of prostate (HCC)    • Morbid obesity (HCC) 8/11/2016   • Pneumonia 05/12/2021   • Primary osteoarthritis involving multiple joints 6/13/2016    Impression: 03/08/2016 - stable;    • Pulmonary embolism (HCC)    • Sleep apnea 05/12/2021    C-Pap   • Uncontrolled type 2 diabetes mellitus with hyperglycemia (HCC) 6/13/2016    Impression: 03/08/2016 - seeing Endo .;    • Vertigo      Past Surgical History:   Procedure Laterality Date   • CARDIAC VALVE REPLACEMENT  05/12/2021    Ross procedure 22 years ago   • COLON SURGERY     • COLONOSCOPY     • COLOSTOMY  1999   • COLOSTOMY REVISION     • CYSTOSCOPY N/A 12/7/2021    Procedure: CYSTOSCOPY FLEXIBLE;  Surgeon: José Miguel Villafuerte Jr., MD;  Location: [facility] OR;  Service: Urology;  Laterality: N/A;   • EXPLORATORY LAPAROTOMY      With Tissue Removal   • LIPOMA EXCISION     • MOHS SURGERY     • NEPHROURETERECTOMY Right 12/7/2021    Procedure: RIGHT NEPHROURETERECTOMY LAPAROSCOPIC WITH DAVINCI ROBOT;  Surgeon: José Miguel Villafuerte Jr., MD;  Location:  [facility];  Service: Robotics - DaVinci;  Laterality: Right;   • OTHER SURGICAL HISTORY      Porcine Valve   • PROSTATE SURGERY     • PROSTATECTOMY  2000     History of Prostatectomy Perineal Radical   • SKIN CANCER EXCISION      from head and lip   • TONSILLECTOMY        General Information     Row Name 06/07/22 1040          OT Time and Intention    Document Type therapy note (daily note)  -JY     Mode of Treatment occupational therapy;individual therapy  -JY     Row Name 06/07/22 1040          General Information    Patient Profile Reviewed yes  -JY     Existing Precautions/Restrictions fall;oxygen therapy device and L/min  " -SARA     Barriers to Rehab medically complex  -JY     Row Name 06/07/22 1040          Cognition    Orientation Status (Cognition) oriented x 4  -JY     Row Name 06/07/22 1040          Safety Issues, Functional Mobility    Safety Issues Affecting Function (Mobility) awareness of need for assistance;insight into deficits/self-awareness;judgment;problem-solving;safety precaution awareness;safety precautions follow-through/compliance;sequencing abilities  -JKATHLEEN     Impairments Affecting Function (Mobility) balance;endurance/activity tolerance;shortness of breath;strength;postural/trunk control;cognition  -JKATHLEEN     Cognitive Impairments, Mobility Safety/Performance insight into deficits/self-awareness;judgment;problem-solving/reasoning;safety precaution awareness;safety precaution follow-through;sequencing abilities;other (see comments)  pt with very brief, intermittent confusion referencing being \"here\" in [place] vs [place] when just prior stating anticipated d/c to [place]  -SARA     Comment, Safety Issues/Impairments (Mobility) pt alert and able to follow commands, pt req'd initial motivational cues, education on OT purpose to complete progressive mobility OOB and ADLs as pt noted, \"likely discharging home or to rehab and don't really need to do this  -SARA           User Key  (r) = Recorded By, (t) = Taken By, (c) = Cosigned By    Initials Name Provider Type    Olimpia Cook, OT Occupational Therapist                 Mobility/ADL's     Row Name 06/07/22 1045          Bed Mobility    Bed Mobility supine-sit;sit-supine;scooting/bridging  -SARA     Scooting/Bridging [place] (Bed Mobility) supervision;verbal cues  -SARA     Supine-Sit [place] (Bed Mobility) contact guard;verbal cues  -SARA     Sit-Supine [place] (Bed Mobility) contact guard;verbal cues  -SARA     Assistive Device (Bed Mobility) head of bed elevated;bed rails  -SARA     Comment, (Bed Mobility) pt demonstrated need for gross CGA for bed mobility, " largely for lattermost uprighting of trunk into sitting and to elevate LEs back to bed height in return to supine; positioned in sidelying L to support hx of R nephrectomy; denied any dizziness or feeling LH upon sitting, SPO2 maintained >/= 90% with supplemental O2  -J     Row Name 06/07/22 1045          Transfers    Transfers sit-stand transfer;stand-sit transfer  -JY     Comment, (Transfers) skilled cues for optimal hand placement for controlled ascend, descend specifically to reach back prior to sitting and pushing up from seated surfaces vs grasping/pulling at FWW; pt did not require significant physical A yet req'd said cues for safety  -JY     Sit-Stand [place] (Transfers) supervision;verbal cues  -JY     Stand-Sit [place] (Transfers) contact guard;verbal cues  -JY     Row Name 06/07/22 1045          Sit-Stand Transfer    Assistive Device (Sit-Stand Transfers) walker, front-wheeled  -JONATHAN     Row Name 06/07/22 1045          Stand-Sit Transfer    Assistive Device (Stand-Sit Transfers) walker, front-wheeled  -     Row Name 06/07/22 1045          Functional Mobility    Functional Mobility- Comment defer to PT for specifics regarding fxl ambulation, pt demonstrated need for CGA with FWW during lateral stepping to move self toward HOB for improved positioning once seated, v/c for optimal placement of FWW with self as steps advanced  -J     Row Name 06/07/22 1045          Activities of Daily Living    BADL Assessment/Intervention lower body dressing;upper body dressing;grooming  -     Row Name 06/07/22 1045          Lower Body Dressing Assessment/Training    [place] Level (Lower Body Dressing) doff;don;socks;supervision;verbal cues  -JY     Position (Lower Body Dressing) edge of bed sitting  -JY     Comment, (Lower Body Dressing) pt able to demo figure 4 position while seated to reach feet more proximally and d/d without physical A  -JKATHLEEN     Row Name 06/07/22 1045          Upper Body Dressing  Assessment/Training    [place] Level (Upper Body Dressing) doff;don;pajama/robe;minimum assist (75% patient effort);verbal cues  -JY     Position (Upper Body Dressing) unsupported sitting;edge of bed sitting  -JY     Comment, (Upper Body Dressing) min A for grossly proximal and posterior management of gown and posterior tying/untying of gown  -JY     Row Name 06/07/22 1045          Grooming Assessment/Training    [place] Level (Grooming) wash face, hands;standby assist;verbal cues  -JY     Position (Grooming) supported standing;sink side  -JY     Comment, (Grooming) gross SBA for face/hand washing tasks, CGA for standing balance while pt stood sink side  -JY           User Key  (r) = Recorded By, (t) = Taken By, (c) = Cosigned By    Initials Name Provider Type    Olimpia Cook OT Occupational Therapist               Obj/Interventions     Row Name 06/07/22 1053          Motor Skills    Motor Skills functional endurance  -JY     Functional Endurance pt presented this date with standing activity tolerance of ~ 5 minutes while supported with FWW and completing functional tasks with SPO2 grossly >/= 90% while monitored, pt denied any SOA yet did seek seated rest break after standing trial  -JY     Row Name 06/07/22 1053          Balance    Balance Assessment sitting static balance;sitting dynamic balance;standing static balance;standing dynamic balance  -JY     Static Sitting Balance independent  -JY     Dynamic Sitting Balance supervision;other (see comments)  LBD  -JY     Static Standing Balance supervision;verbal cues  -JY     Dynamic Standing Balance contact guard;verbal cues  -JY     Position/Device Used, Standing Balance supported;walker, front-wheeled  -JY     Balance Interventions sitting;standing;static;dynamic;sit to stand;supported;occupation based/functional task  -JY     Comment, Balance no overt LOB during seated or standing tasks, utilized FWW throughout standing for support, able to  complete more dynamic LBD (d/d socks) while unsupported at EOB this date  -JY           User Key  (r) = Recorded By, (t) = Taken By, (c) = Cosigned By    Initials Name Provider Type    Olimpia Cook, OT Occupational Therapist               Goals/Plan    No documentation.                Clinical Impression     Row Name 06/07/22 1057          Pain Assessment    Pretreatment Pain Rating 0/10 - no pain  -JY     Posttreatment Pain Rating 0/10 - no pain  -JY     Pre/Posttreatment Pain Comment denies any pain and tolerated OT interventions  -JY     Pain Intervention(s) Repositioned;Ambulation/increased activity  -JY     Row Name 06/07/22 1057          Plan of Care Review    Plan of Care Reviewed With patient  -JY     Progress improving  -JY     Outcome Evaluation Pt demonstrates improved performance in ADLs, activity tolerance toward more dynamic tasks both in sitting EOB and standing at sink side and fxl transfers to achieve different positions after initial motivational cues/education for change of position. Pt demonstrated supine<> sitting with CGA, spv for scooting to EOB, spv for STS at EOB and CGA for standing sink side and lateral stepping for positioning with FWW. Pt at EOB and unsupported able to complete d/d socks with spv, d/d gown with min A and wash face/hands with SBA while standing with CGA for balance at sink. Pt stood for increased ~ 5 mins w/ SPO2 >/= 90% throughout with supplemental O2, rest break after standing tolerance yet with pt reporting decreased perceived exertion/SOA. Will progress pt as able during hospitalization.  -JY     Row Name 06/07/22 1057          Vital Signs    Pre Systolic BP Rehab --  u/a to obtain BP w/ error in reading, RN aware  -JY     Pretreatment Heart Rate (beats/min) 69  -JY     Posttreatment Heart Rate (beats/min) 74  -JY     Pre SpO2 (%) 96  -JY     O2 Delivery Pre Treatment supplemental O2  -JY     Intra SpO2 (%) 91  -JY     O2 Delivery Intra Treatment supplemental O2   -JY     Post SpO2 (%) 94  -JY     O2 Delivery Post Treatment supplemental O2  -JY     Pre Patient Position Supine  -JY     Intra Patient Position Standing  -JY     Post Patient Position Supine  -JY     Row Name 06/07/22 1057          Positioning and Restraints    Pre-Treatment Position in bed  -JY     Post Treatment Position bed  -JY     In Bed notified nsg;side lying left;call light within reach;encouraged to call for assist;exit alarm on;side rails up x2  -JY           User Key  (r) = Recorded By, (t) = Taken By, (c) = Cosigned By    Initials Name Provider Type    Olimpia Cook OT Occupational Therapist               Outcome Measures     Row Name 06/07/22 1107          How much help from another is currently needed...    Putting on and taking off regular lower body clothing? 3  -JY     Bathing (including washing, rinsing, and drying) 3  -JY     Toileting (which includes using toilet bed pan or urinal) 3  -JY     Putting on and taking off regular upper body clothing 3  -JY     Taking care of personal grooming (such as brushing teeth) 3  -JY     Eating meals 3  -JY     AM-PAC 6 Clicks Score (OT) 18  -JY     Row Name 06/07/22 1107          Functional Assessment    Outcome Measure Options AM-PAC 6 Clicks Daily Activity (OT)  -JY           User Key  (r) = Recorded By, (t) = Taken By, (c) = Cosigned By    Initials Name Provider Type    Olimpia Cook OT Occupational Therapist                Occupational Therapy Education                 Title: PT OT SLP Therapies (In Progress)     Topic: Occupational Therapy (In Progress)     Point: ADL training (In Progress)     Description:   Instruct learner(s) on proper safety adaptation and remediation techniques during self care or transfers.   Instruct in proper use of assistive devices.              Learning Progress Summary           Patient Acceptance, E,D, NR by SARA at 6/7/2022 0921    Acceptance, E,D, VU,DU,NR by  at 6/3/2022 1538    Comment: PLB, safety awareness  w/ AD                   Point: Home exercise program (Not Started)     Description:   Instruct learner(s) on appropriate technique for monitoring, assisting and/or progressing therapeutic exercises/activities.              Learner Progress:  Not documented in this visit.          Point: Precautions (In Progress)     Description:   Instruct learner(s) on prescribed precautions during self-care and functional transfers.              Learning Progress Summary           Patient Acceptance, E,D, NR by SARA at 6/7/2022 0921    Acceptance, E,D, VU,DU,NR by  at 6/3/2022 1538    Comment: PLB, safety awareness w/ AD                   Point: Body mechanics (In Progress)     Description:   Instruct learner(s) on proper positioning and spine alignment during self-care, functional mobility activities and/or exercises.              Learning Progress Summary           Patient Acceptance, E,D, NR by SARA at 6/7/2022 0921    Acceptance, E,D, VU,DU,NR by TORSTEN at 6/3/2022 1538    Comment: PLB, safety awareness w/ AD                               User Key     Initials Effective Dates Name Provider Type Discipline    SARA 06/16/21 -  Olimpia Duggan, OT Occupational Therapist OT     06/16/21 -  Matthew Sloan OT Occupational Therapist OT              OT Recommendation and Plan     Plan of Care Review  Plan of Care Reviewed With: patient  Progress: improving  Outcome Evaluation: Pt demonstrates improved performance in ADLs, activity tolerance toward more dynamic tasks both in sitting EOB and standing at sink side and fxl transfers to achieve different positions after initial motivational cues/education for change of position. Pt demonstrated supine<> sitting with CGA, spv for scooting to EOB, spv for STS at EOB and CGA for standing sink side and lateral stepping for positioning with FWW. Pt at EOB and unsupported able to complete d/d socks with spv, d/d gown with min A and wash face/hands with SBA while standing with CGA for balance at  sink. Pt stood for increased ~ 5 mins w/ SPO2 >/= 90% throughout with supplemental O2, rest break after standing tolerance yet with pt reporting decreased perceived exertion/SOA. Will progress pt as able during hospitalization.     Time Calculation:    Time Calculation- OT     Row Name 06/07/22 1109             Time Calculation- OT    OT Received On 06/07/22  -JY      OT Goal Re-Cert Due Date 06/13/22  -JY              Timed Charges    61328 - OT Therapeutic Activity Minutes 23  -JY      46296 - OT Self Care/Mgmt Minutes 15  -JY              Total Minutes    Timed Charges Total Minutes 38  -JY       Total Minutes 38  -JY            User Key  (r) = Recorded By, (t) = Taken By, (c) = Cosigned By    Initials Name Provider Type    Olimpia Cook OT Occupational Therapist              Therapy Charges for Today     Code Description Service Date Service Provider Modifiers Qty    03232058897 HC OT THERAPEUTIC ACT EA 15 MIN 6/7/2022 Olimpia Duggan OT GO 2    36900593815 HC OT SELF CARE/MGMT/TRAIN EA 15 MIN 6/7/2022 Olimpia Duggan OT GO 1               Olimpia Duggan OT  6/7/2022    Electronically signed by Olimpia Duggan OT at 06/07/22 1111

## 2022-06-09 LAB
AMMONIA BLD-SCNC: 13 UMOL/L (ref 16–60)
ANION GAP SERPL CALCULATED.3IONS-SCNC: 8 MMOL/L (ref 5–15)
ARTERIAL PATENCY WRIST A: ABNORMAL
ATMOSPHERIC PRESS: ABNORMAL MM[HG]
BASE EXCESS BLDA CALC-SCNC: 3.5 MMOL/L (ref 0–2)
BDY SITE: ABNORMAL
BODY TEMPERATURE: 37 C
BUN SERPL-MCNC: 48 MG/DL (ref 8–23)
BUN/CREAT SERPL: 35.6 (ref 7–25)
CALCIUM SPEC-SCNC: 9 MG/DL (ref 8.6–10.5)
CHLORIDE SERPL-SCNC: 105 MMOL/L (ref 98–107)
CO2 BLDA-SCNC: 31.2 MMOL/L (ref 22–33)
CO2 SERPL-SCNC: 32 MMOL/L (ref 22–29)
COHGB MFR BLD: 0.6 % (ref 0–2)
CREAT SERPL-MCNC: 1.35 MG/DL (ref 0.76–1.27)
EGFRCR SERPLBLD CKD-EPI 2021: 51.8 ML/MIN/1.73
EPAP: 0
GLUCOSE BLDC GLUCOMTR-MCNC: 106 MG/DL (ref 70–130)
GLUCOSE BLDC GLUCOMTR-MCNC: 184 MG/DL (ref 70–130)
GLUCOSE BLDC GLUCOMTR-MCNC: 200 MG/DL (ref 70–130)
GLUCOSE BLDC GLUCOMTR-MCNC: 275 MG/DL (ref 70–130)
GLUCOSE SERPL-MCNC: 98 MG/DL (ref 65–99)
HCO3 BLDA-SCNC: 29.6 MMOL/L (ref 20–26)
HCT VFR BLD CALC: 36.6 % (ref 38–51)
HGB BLDA-MCNC: 11.9 G/DL (ref 13.5–17.5)
INHALED O2 CONCENTRATION: 40 %
IPAP: 0
MAGNESIUM SERPL-MCNC: 2.3 MG/DL (ref 1.6–2.4)
METHGB BLD QL: 0.4 % (ref 0–1.5)
MODALITY: ABNORMAL
NOTE: ABNORMAL
OXYHGB MFR BLDV: 97.5 % (ref 94–99)
PAW @ PEAK INSP FLOW SETTING VENT: 0 CMH2O
PCO2 BLDA: 50.8 MM HG (ref 35–45)
PCO2 TEMP ADJ BLD: 50.8 MM HG (ref 35–48)
PH BLDA: 7.37 PH UNITS (ref 7.35–7.45)
PH, TEMP CORRECTED: 7.37 PH UNITS
PO2 BLDA: 118 MM HG (ref 83–108)
PO2 TEMP ADJ BLD: 118 MM HG (ref 83–108)
POTASSIUM SERPL-SCNC: 4.2 MMOL/L (ref 3.5–5.2)
SARS-COV-2 RDRP RESP QL NAA+PROBE: NORMAL
SODIUM SERPL-SCNC: 145 MMOL/L (ref 136–145)
TOTAL RATE: 0 BREATHS/MINUTE
VENTILATOR MODE: ABNORMAL

## 2022-06-09 PROCEDURE — 36600 WITHDRAWAL OF ARTERIAL BLOOD: CPT

## 2022-06-09 PROCEDURE — 63710000001 INSULIN DETEMIR PER 5 UNITS: Performed by: INTERNAL MEDICINE

## 2022-06-09 PROCEDURE — 82962 GLUCOSE BLOOD TEST: CPT

## 2022-06-09 PROCEDURE — 80048 BASIC METABOLIC PNL TOTAL CA: CPT | Performed by: INTERNAL MEDICINE

## 2022-06-09 PROCEDURE — 93005 ELECTROCARDIOGRAM TRACING: CPT | Performed by: INTERNAL MEDICINE

## 2022-06-09 PROCEDURE — 94799 UNLISTED PULMONARY SVC/PX: CPT

## 2022-06-09 PROCEDURE — G0378 HOSPITAL OBSERVATION PER HR: HCPCS

## 2022-06-09 PROCEDURE — 82140 ASSAY OF AMMONIA: CPT | Performed by: INTERNAL MEDICINE

## 2022-06-09 PROCEDURE — 93010 ELECTROCARDIOGRAM REPORT: CPT | Performed by: INTERNAL MEDICINE

## 2022-06-09 PROCEDURE — 97110 THERAPEUTIC EXERCISES: CPT

## 2022-06-09 PROCEDURE — 94660 CPAP INITIATION&MGMT: CPT

## 2022-06-09 PROCEDURE — 83050 HGB METHEMOGLOBIN QUAN: CPT

## 2022-06-09 PROCEDURE — 87635 SARS-COV-2 COVID-19 AMP PRB: CPT | Performed by: INTERNAL MEDICINE

## 2022-06-09 PROCEDURE — 97116 GAIT TRAINING THERAPY: CPT

## 2022-06-09 PROCEDURE — 83735 ASSAY OF MAGNESIUM: CPT | Performed by: INTERNAL MEDICINE

## 2022-06-09 PROCEDURE — 82375 ASSAY CARBOXYHB QUANT: CPT

## 2022-06-09 PROCEDURE — 25010000002 HEPARIN (PORCINE) PER 1000 UNITS: Performed by: INTERNAL MEDICINE

## 2022-06-09 PROCEDURE — 96372 THER/PROPH/DIAG INJ SC/IM: CPT

## 2022-06-09 PROCEDURE — 63710000001 INSULIN LISPRO (HUMAN) PER 5 UNITS: Performed by: INTERNAL MEDICINE

## 2022-06-09 PROCEDURE — 99226 PR SBSQ OBSERVATION CARE/DAY 35 MINUTES: CPT | Performed by: INTERNAL MEDICINE

## 2022-06-09 PROCEDURE — 82805 BLOOD GASES W/O2 SATURATION: CPT

## 2022-06-09 RX ORDER — CHOLECALCIFEROL (VITAMIN D3) 125 MCG
5 CAPSULE ORAL NIGHTLY
Status: DISCONTINUED | OUTPATIENT
Start: 2022-06-09 | End: 2022-06-10 | Stop reason: HOSPADM

## 2022-06-09 RX ADMIN — EMPAGLIFLOZIN 10 MG: 10 TABLET, FILM COATED ORAL at 09:50

## 2022-06-09 RX ADMIN — INSULIN LISPRO 4 UNITS: 100 INJECTION, SOLUTION INTRAVENOUS; SUBCUTANEOUS at 12:02

## 2022-06-09 RX ADMIN — INSULIN LISPRO 6 UNITS: 100 INJECTION, SOLUTION INTRAVENOUS; SUBCUTANEOUS at 17:06

## 2022-06-09 RX ADMIN — METOPROLOL SUCCINATE 100 MG: 100 TABLET, EXTENDED RELEASE ORAL at 09:50

## 2022-06-09 RX ADMIN — ALLOPURINOL 300 MG: 300 TABLET ORAL at 12:02

## 2022-06-09 RX ADMIN — HEPARIN SODIUM 5000 UNITS: 5000 INJECTION, SOLUTION INTRAVENOUS; SUBCUTANEOUS at 14:00

## 2022-06-09 RX ADMIN — FUROSEMIDE 40 MG: 40 TABLET ORAL at 09:51

## 2022-06-09 RX ADMIN — INSULIN DETEMIR 20 UNITS: 100 INJECTION, SOLUTION SUBCUTANEOUS at 21:43

## 2022-06-09 RX ADMIN — HEPARIN SODIUM 5000 UNITS: 5000 INJECTION, SOLUTION INTRAVENOUS; SUBCUTANEOUS at 06:19

## 2022-06-09 RX ADMIN — HEPARIN SODIUM 5000 UNITS: 5000 INJECTION, SOLUTION INTRAVENOUS; SUBCUTANEOUS at 21:35

## 2022-06-09 NOTE — CASE MANAGEMENT/SOCIAL WORK
Continued Stay Note  [facility]     Patient Name: Blane Dietz  MRN: [id number]  Today's Date: 6/9/2022    Admit Date: 6/2/2022     Discharge Plan     Row Name 06/09/22 0809       Plan    Plan [facility]    Patient/Family in Agreement with Plan yes    Plan Comments CM received a voicemail from Vanna and [facility] can offer a bed to the patient. Spoke with Susana by phone and patient at bedside and they will accept the bed. Called Vanna at [facility] and told her to start the precert. CM will continue to follow.    Final Discharge Disposition Code 03 - skilled nursing facility (SNF)               Discharge Codes    No documentation.               Expected Discharge Date and Time     Expected Discharge Date Expected Discharge Time    Jun 7, 2022             Jesus Saunders RN

## 2022-06-09 NOTE — DISCHARGE PLACEMENT REQUEST
"Blandon Rc KRIS (86 y.o. Male)   Minh Saunders, RN Case Manager  [phone]            Date of Birth   1935    Social Security Number       Address   [address]  [facility] [address]    Home Phone   [phone]    MRN   [id number]       [place]th[place]   [facility]    Marital Status   Single                            Admission Date   22    Admission Type   Emergency    Admitting Provider   Ada Gautam MD    Attending Provider   Ada Gautam MD    Department, Room/Bed   [facility] 3E, S336/1       Discharge Date       Discharge Disposition       Discharge Destination                               Attending Provider: Ada Gatuam MD    Allergies: Ampicillin, Medrol [Methylprednisolone], Myrbetriq [Mirabegron], Penicillins, Bee Venom    Isolation: None   Infection: COVID Screen (preop/placement) (22)   Code Status: CPR   Advance Care Planning Activity    Ht: 167.6 cm (65.98\")   Wt: 84.8 kg (186 lb 15.2 oz)    Admission Cmt: None   Principal Problem: Acute on chronic respiratory failure with hypoxia (HCC) [J96.21]                 Active Insurance as of 2022     Primary Coverage     Payor Plan Insurance Group Employer/Plan Group    HUMANA MEDICARE REPLACEMENT HUMANA MEDICARE REPLACEMENT [id number]     Payor Plan Address Payor Plan Phone Number Payor Plan Fax Number Effective Dates    [address] [address] [phone]  2018 - None Entered    [facility] [address]       Subscriber Name Subscriber Birth Date Member ID       RC BLANDON 1935 [id number]                 Emergency Contacts      (Rel.) Home Phone Work Phone Mobile Phone    Urban Blandon (Power of ) [phone] -- [phone]    CLIFFORD WHITAKER (Sister) [phone] -- --        CPAP/BiPAP Settings   $ NIV Initiation and Mgmt -- -- -- -- --   $  CPAP/BIPAP Initiation and Mgmt -- -- -- -- --   $ NIV Pt/System Assessment Charge yes -- -- -- --   Mode of Delivery " CPAP -- -- -- --   Equipment Type V-60 -- -- -- --   Equipment ID -- -- -- -- --   Method of Delivery full face mask -- -- -- --   CPAP (cm H2O) 10 -- -- -- --   IPAP (cm H2O) -- -- -- -- --   IPAP Min (cm H2O) -- -- -- -- --   IPAP Max (cm H2O) -- -- -- -- --   EPAP (cm H2O) -- -- -- -- --   EPAP Min (cm H2O) -- -- -- -- --   EPAP Max (cm H2O) -- -- -- -- --   Pressure Support (cm H20) -- -- -- -- --   Pressure Support Max (cm H20) -- -- -- -- --   Pressure Support Min (cm H20) -- -- -- -- --   Set Rate (Breaths/Min) -- -- -- -- --   Respiratory Rate Total (Breaths/Min) 20 breaths/min -- -- -- --   Oxygen Concentration (%) 40 -- -- -- --   Flow (L/min) -- -- 4 4 --   FiO2 Auto Set -- -- -- -- --   Biphasic Flow Rate High -- -- -- -- --   Biphasic Flow Rate Low -- -- -- -- --   Timed Inspiration (Sec) -- -- -- -- --   IPAP Rise Time (Sec) 3 -- -- -- --   Tidal Volume Targeted (mL) -- -- -- -- --   Tidal Volume (mL) 415 mL -- -- -- --   Minute Ventilation (L/Min) 11.7 -- -- -- --   Peak Inspiratory Pressure (cm H2O) 11 -- -- -- --   TiTOT (%) -- -- -- -- --   Total Leak (L/Min) 62 -- -- -- --   Humidifier -- -- -- -- --   Heater Temperature -- -- -- -- --   Skin Integrity -- -- -- intact --   Device Skin Pressure Protection skin-to-device areas padded -- -- adhesive use limited;positioning supports utilized;pressure points protected;skin-to-device areas padded

## 2022-06-09 NOTE — PROGRESS NOTES
[facility] Medicine Services  PROGRESS NOTE    Patient Name: Blane Dietz  : 1935  MRN: [id number]    Date of Admission: 2022  Primary Care Physician: David Em MD    Subjective   Subjective     CC:  F/U CHF    HPI:   Pt confused overnight. ABG was unremarkable. This am, he is at baseline.  RN concerned because of tachycardia and ectopy on telemetry this am-we had held his beta blocker last two doses due to hypotension. Advised her to administer this now.     ROS:  Gen- No fevers, chills  CV- No chest pain, palpitations  Resp- No cough, dyspnea  GI- No N/V/D, abd pain        Objective   Objective     Vital Signs:   Temp:  [97.6 °F (36.4 °C)-98.2 °F (36.8 °C)] 98.2 °F (36.8 °C)  Heart Rate:  [] 62  Resp:  [16-22] 20  BP: ()/(72-85) 149/85  Flow (L/min):  [2-4] 4     Physical Exam:  Constitutional: No acute distress, awake, alert  HENT: NCAT, mucous membranes moist  Respiratory: Respiratory effort normal on nasal cannula  Cardiovascular: RRR  Musculoskeletal: Trace bilateral ankle edema  Psychiatric: Appropriate affect, cooperative  Neurologic: Speech clear and fluent, confused at times (doesn't remember that we met yesterday)  Skin: No rashes on exposed surfaces    Results Reviewed:  LAB RESULTS:      Lab 22  0804 22  1510 22  1703   WBC 9.88 11.63* 9.69   HEMOGLOBIN 12.2* 12.2* 12.9*   HEMATOCRIT 38.8 39.8 41.3   PLATELETS 191 210 188   NEUTROS ABS 6.49 8.89* 7.05*   IMMATURE GRANS (ABS) 0.21* 0.17* 0.22*   LYMPHS ABS 1.84 1.22 1.30   MONOS ABS 0.80 1.08* 0.70   EOS ABS 0.31 0.09 0.21   MCV 84.7 87.9 87.3   CRP  --   --  <0.30   PROCALCITONIN  --   --  0.11   LACTATE  --   --  1.6   LDH  --   --  304*         Lab 22  0804 22  0801 22  2351 22  0854 22  0801 22  1510   SODIUM 139 142  --  145 142 141   POTASSIUM 3.7 3.8  --  3.8 4.1 4.2   CHLORIDE 99 101  --  101 99 101   CO2 28.0 31.0*  --  35.0* 31.0*  30.0*   ANION GAP 12.0 10.0  --  9.0 12.0 10.0   BUN 47* 42*  --  36* 28* 21   CREATININE 1.45* 1.40*  --  1.41* 1.36* 1.28*   EGFR 47.5* 49.6*  --  49.1* 51.3* 55.2*   GLUCOSE 83 94  --  147* 138* 127*   CALCIUM 9.5 9.6  --  9.5 9.1 10.1   MAGNESIUM  --   --  2.1  --   --  2.0   HEMOGLOBIN A1C  --   --   --   --   --  9.10*   TSH  --   --   --   --   --  1.580         Lab 06/02/22  1703   TOTAL PROTEIN 7.3   ALBUMIN 4.20   GLOBULIN 3.1   ALT (SGPT) 48*   AST (SGOT) 48*   BILIRUBIN 1.2   ALK PHOS 227*         Lab 06/03/22  1510 06/02/22  2131 06/02/22  1703   PROBNP  --   --  5,664.0*   TROPONIN T 0.052* 0.047* 0.041*         Lab 06/03/22  1510   CHOLESTEROL 169   LDL CHOL 78   HDL CHOL 74*   TRIGLYCERIDES 97         Lab 06/02/22  1703   FERRITIN 167.30         Lab 06/09/22  0100 06/02/22  2351   PH, ARTERIAL 7.374 7.359   PCO2, ARTERIAL 50.8* 46.0*   PO2 .0* 87.7   FIO2 40 36   HCO3 ART 29.6* 25.9   BASE EXCESS ART 3.5* 0.0   CARBOXYHEMOGLOBIN 0.6 0.9     Brief Urine Lab Results  (Last result in the past 365 days)      Color   Clarity   Blood   Leuk Est   Nitrite   Protein   CREAT   Urine HCG        12/06/21 0908 Yellow   Clear   Negative   Negative   Negative   Negative                 Microbiology Results Abnormal     Procedure Component Value - Date/Time    COVID PRE-OP / PRE-PROCEDURE SCREENING ORDER (NO ISOLATION) - Swab, Nasopharynx [[id number]]  (Normal) Collected: 06/02/22 1650    Lab Status: Final result Specimen: Swab from Nasopharynx Updated: 06/02/22 8527    Narrative:      The following orders were created for panel order COVID PRE-OP / PRE-PROCEDURE SCREENING ORDER (NO ISOLATION) - Swab, Nasopharynx.  Procedure                               Abnormality         Status                     ---------                               -----------         ------                     COVID-19 and FLU A/B PCR...[[id number]]  Normal              Final result                 Please view results for these tests  on the individual orders.    COVID-19 and FLU A/B PCR - Swab, Nasopharynx [[id number]]  (Normal) Collected: 06/02/22 1650    Lab Status: Final result Specimen: Swab from Nasopharynx Updated: 06/02/22 1732     COVID19 Not Detected     Influenza A PCR Not Detected     Influenza B PCR Not Detected    Narrative:      Fact sheet for providers: https://www.fda.gov/media/617251/download    Fact sheet for patients: https://www.fda.gov/media/379426/download    Test performed by PCR.          No radiology results from the last 24 hrs    Results for orders placed during the hospital encounter of 06/02/22    Adult Transthoracic Echo Complete w/ Color, Spectral and Contrast if necessary per protocol    Interpretation Summary  · There is a left pleural effusion. There is a right pleural effusion.  · Left ventricular wall thickness is consistent with hypertrophy.  · The right ventricular cavity is mildly dilated.  · Left atrial volume is mildly increased.  · The right atrial cavity is dilated.  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  · There is a bioprosthetic aortic valve present.  · Peak velocity of the flow distal to the aortic valve is 105.1 cm/s. Aortic valve mean pressure gradient is 3 mmHg.  · Aortic valve area is 2.9 cm2.  · Moderate aortic valve regurgitation is present.  · Moderate tricuspid valve regurgitation is present.  · Estimated right ventricular systolic pressure from tricuspid regurgitation is moderately elevated (45-55 mmHg). Calculated right ventricular systolic pressure from tricuspid regurgitation is 52 mmHg.  · Ejection fraction is low normal      I have reviewed the medications:  Scheduled Meds:allopurinol, 300 mg, Oral, Daily  amLODIPine, 5 mg, Oral, Daily  empagliflozin, 10 mg, Oral, Daily  fluticasone, 2 spray, Nasal, Daily  furosemide, 40 mg, Oral, Daily  heparin (porcine), 5,000 Units, Subcutaneous, Q8H  insulin detemir, 20 Units, Subcutaneous, Nightly  insulin lispro, 0-9 Units,  Subcutaneous, TID AC  metoprolol succinate XL, 100 mg, Oral, Daily  pharmacy consult - MTM, , Does not apply, Daily  sacubitril-valsartan, 1 tablet, Oral, Q12H      Continuous Infusions:   PRN Meds:.•  acetaminophen **OR** acetaminophen **OR** acetaminophen  •  senna-docusate sodium **AND** polyethylene glycol **AND** bisacodyl **AND** bisacodyl  •  dextrose  •  dextrose  •  glucagon (human recombinant)  •  HYDROcodone-acetaminophen  •  melatonin  •  sodium chloride    Assessment & Plan   Assessment & Plan     Active Hospital Problems    Diagnosis  POA   • **Acute on chronic respiratory failure with hypoxia (HCC) [J96.21]  Unknown   • Elevated troponin [R77.8]  Unknown   • Cirrhosis of liver (HCC) [K74.60]  Unknown   • Elevated transaminase level [R74.01]  Unknown   • Pleural effusion, bilateral [J90]  Unknown   • Normocytic anemia [D64.9]  Unknown   • History of pulmonary embolism [Z86.711]  Unknown   • Acute on chronic congestive heart failure, unspecified heart failure type (HCC) [I50.9]  Yes   • 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        Brief Hospital Course to date:  Blane Dietz is a 86 y.o. 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    This patient's problems and plans were partially entered by my partner and updated as appropriate by me 06/09/22.    Acute on chronic hypoxic respiratory failure  Acute exacerbation of diastolic CHF  Bilateral pleural effusions  Aortic stenosis s/p ROSS procedure  Elevated troponin  HUSSAIN on CPAP  --Likely due to medication non-adherence  --S/P IV diuresis  --Cardiology following, transitioned to PO lasix, started entresto. Held due to hypotension last two mornings- Cardiology came back by and recommended different timing of Entresto and put in hold parameters.   --Continue home jardiance  -- Cr stable on last check    Hospital Delirium  -- seems to be sundowning likely due to prolonged hospitalization  -- will trial Melatonin tonight   -- unable to do Seroquel due to QTc prolongation     Elevated transaminase levels  Cirrhosis  Liver lesion  -Imaging concerning for cirrhosis with lesion in left lobe of liver  --Will need additional imaging with MRI or CT liver mass protocol as an outpatient  --Outpatient GI follow up-will place referral at discharge.  Discussed with patient.     History of pulmonary embolism  -Completed 1 year of Eliquis therapy.  Per Dr. Ramon's last note, patient was instructed to discontinue Eliquis in March.    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 Ramon outpatient     Diabetes mellitus type 2  -Has not been taking insulin for over 10 days.  -Contininue Levemir 20 units nightly with moderate SSI, jardiance  -Last A1c was 8.8%    DVT prophylaxis:  Medical DVT prophylaxis orders are present.       AM-PAC 6 Clicks Score (PT): 17 (06/08/22 0811)    Disposition: I expect the patient to be discharged to rehab pending insurance precert possibly tomorrow    CODE STATUS:   Code Status and  Medical Interventions:   Ordered at: 06/03/22 0004     Level Of Support Discussed With:    Patient     Code Status (Patient has no pulse and is not breathing):    CPR (Attempt to Resuscitate)     Medical Interventions (Patient has pulse or is breathing):    Full Support       Ada Gautam MD  06/09/22

## 2022-06-09 NOTE — SIGNIFICANT NOTE
Pt w/ increased confusion this evening per RN. VS remain stable. No increase in oxygen demand. Pt just placed on CPAP for sleep. ABG ordered.

## 2022-06-09 NOTE — PROGRESS NOTES
Blane Dietz  1935  S336/1      Asked by primary service to re evaluate patient's medications with recent hypotension (holding meds) and increased ectopy.    At time of re evaluation, RN at bedside.  /85, HR 85.  Patient is asymptomatic lying in bed.    Will DC Amlodipine.  Continue lasix and Toprol XL.  Continue Entresto with parameters to hold.  Encourage continued activity/up to chair, PO intake. Discussed importance of patient getting beta blocker.    Will continue to monitor.    Ginny Cramer PA-C

## 2022-06-09 NOTE — DISCHARGE PLACEMENT REQUEST
"Rc Blandon (86 y.o. Male)   Minh Saunders, RN Case Manager  [phone]  Looking for short term rehab to possible long term care            Date of Birth   1935    Social Security Number       Address   [address] [facility] [address]    Home Phone   [phone]    MRN   [id number]       Zoroastrian   Pres[employer]    Marital Status   Single                            Admission Date   6/2/22    Admission Type   Emergency    Admitting Provider   Ada Gautam MD    Attending Provider   Ada Gautam MD    Department, Room/Bed   [facility] 3E, S336/1       Discharge Date       Discharge Disposition       Discharge Destination                               Attending Provider: Ada Gautam MD    Allergies: Ampicillin, Medrol [Methylprednisolone], Myrbetriq [Mirabegron], Penicillins, Bee Venom    Isolation: None   Infection: None   Code Status: CPR   Advance Care Planning Activity    Ht: 167.6 cm (65.98\")   Wt: 84.8 kg (186 lb 15.2 oz)    Admission Cmt: None   Principal Problem: Acute on chronic respiratory failure with hypoxia (HCC) [J96.21]                 Active Insurance as of 6/2/2022     Primary Coverage     Payor Plan Insurance Group Employer/Plan Group    HUMANA MEDICARE REPLACEMENT HUMANA MEDICARE REPLACEMENT [id number]     Payor Plan Address Payor Plan Phone Number Payor Plan Fax Number Effective Dates    [address] [address] [phone]  1/1/2018 - None Entered    [facility] [id number]       Subscriber Name Subscriber Birth Date Member ID       RC BLANDNO 1935 [id number]                 Emergency Contacts      (Rel.) Home Phone Work Phone Mobile Phone    Urban Blandon (Power of ) [phone] -- [phone]    CLIFFORD WHITAKER (Sister) [phone] -- --               History & Physical      Courtney Ozuna DO at 06/03/22 0029              [facility] Medicine Services  HISTORY AND PHYSICAL    Patient Name: " Blane Dietz  : 1935  MRN: [id number]  Primary Care Physician: David Em MD  Date of admission: 2022      Subjective   Subjective     Chief Complaint:  Shortness of breath    HPI:  Blane Dietz is a 86 y.o. 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  Daughter at bedside assists with HPI.  She complains of shortness of breath which has been ongoing for the past 1 month.  Over the past several days his symptoms have became much more severe, breathing much more labored.  He has been wearing his home as needed oxygen continuous and has been wearing his CPAP during the day due to difficulty breathing. He reports increased weakness and fatigue, says he has been too weak to get out of bed to eat for the past several days.  He has had increased edema to his left lower extremity, mild cough.  He denies fever, malaise, orthopnea, dysuria, vomiting, abdominal pain.  He has chronic diarrhea which she says is at baseline.  Due to progressive decline, daughter encouraged him to come to the ED for further evaluation.    Daughter says patient has having a difficult time managing home medications and daughter is currently attempting to get him into assisted living.  But, he has not been taking home meds recently.  He says he has not taken insulin for at least 10 days.    Review of Systems   Constitutional: Positive for activity change, appetite change and fatigue. Negative for chills and fever.   HENT: Negative.    Eyes: Negative.    Respiratory: Positive for cough and  shortness of breath.    Cardiovascular: Positive for leg swelling. Negative for chest pain.   Gastrointestinal: Positive for diarrhea. Negative for abdominal pain, nausea and vomiting.   Endocrine: Negative.    Genitourinary: Negative.    Musculoskeletal: Negative.  Negative for myalgias.   Skin: Negative.    Allergic/Immunologic: Negative.    Neurological: Positive for weakness.   Hematological: Negative.    Psychiatric/Behavioral: Negative.         All other systems reviewed and are negative.     Personal History     Past Medical History:   Diagnosis Date   • Aortic stenosis     status post ROSS procedure, remote, with December 2015 echocardiography revealing normal aortic and pulmonary valve function, normal ejection fraction and mild to moderate MR   • Arthritis    • Bilateral impacted cerumen 11/23/2016   • Bronchitis    • Chronic gout of right foot 6/13/2016    Impression: 03/08/2016 - stable.;    • COVID-19 vaccine series completed 05/12/2021    Maderna 2nd dose 3/12/21.   • Depression    • Diabetes mellitus (HCC)    • Diverticulitis    • Exogenous obesity    • Gout    • Heart murmur    • History of vitamin D deficiency    • Hypercholesteremia 8/11/2016   • Hyperlipidemia    • Hypertension    • Kidney lesion    • Malignant neoplasm of prostate (HCC)    • Morbid obesity (HCC) 8/11/2016   • Pneumonia 05/12/2021   • Primary osteoarthritis involving multiple joints 6/13/2016    Impression: 03/08/2016 - stable;    • Pulmonary embolism (HCC)    • Sleep apnea 05/12/2021    C-Pap   • Uncontrolled type 2 diabetes mellitus with hyperglycemia (HCC) 6/13/2016    Impression: 03/08/2016 - seeing Endo .;    • Vertigo          Oncology Problem List:  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)   (12/28/2021; Status: Active)  Prostate cancer (HCC) (08/11/2016; Status: Active)    Oncology/Hematology History   Prostate cancer (HCC)   8/11/2016 Initial Diagnosis    Prostate cancer (CMS/HCC)     Diffuse  large B-cell lymphoma of solid organ excluding spleen (HCC)   12/28/2021 Initial Diagnosis    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)     12/28/2021 Cancer Staged    Staging form: Hodgkin And Non-Hodgkin Lymphoma, AJCC 8th Edition  - Clinical stage from 12/28/2021: Stage IE (Diffuse large B-cell lymphoma) - Signed by Shannan Ramon MD on 12/28/2021 1/21/2022 -  Chemotherapy    OP CENTRAL VENOUS ACCESS DEVICE ACCESS, CARE, AND MAINTENANCE (CVAD)         Past Surgical History:   Procedure Laterality Date   • CARDIAC VALVE REPLACEMENT  05/12/2021    Ross procedure 22 years ago   • COLON SURGERY     • COLONOSCOPY     • COLOSTOMY  1999   • COLOSTOMY REVISION     • CYSTOSCOPY N/A 12/7/2021    Procedure: CYSTOSCOPY FLEXIBLE;  Surgeon: José Miguel Villafuerte Jr., MD;  Location: [facility] OR;  Service: Urology;  Laterality: N/A;   • EXPLORATORY LAPAROTOMY      With Tissue Removal   • LIPOMA EXCISION     • MOHS SURGERY     • NEPHROURETERECTOMY Right 12/7/2021    Procedure: RIGHT NEPHROURETERECTOMY LAPAROSCOPIC WITH DAVINCI ROBOT;  Surgeon: José Miguel Villafuerte Jr., MD;  Location:  [facility] OR;  Service: Robotics - DaVinci;  Laterality: Right;   • OTHER SURGICAL HISTORY      Porcine Valve   • PROSTATE SURGERY     • PROSTATECTOMY  2000     History of Prostatectomy Perineal Radical   • SKIN CANCER EXCISION      from head and lip   • TONSILLECTOMY         Family History:  family history includes Breast cancer in an other family member; Cancer in his mother and another family member; Diabetes in his mother and another family member; Heart disease in his father; Hypertension in an other family member; Lung cancer in his mother; Migraines in an other family member; Obesity in an other family member. Otherwise pertinent FHx was reviewed and unremarkable.     Social History:  reports that he has never smoked. He has never used smokeless tobacco. He reports that he does not drink alcohol and does not use  drugs.  Social History     Social History Narrative    Right hand dominant       Medications:  Available home medication information reviewed.  (Not in a hospital admission)      Allergies   Allergen Reactions   • Ampicillin Anaphylaxis   • Medrol [Methylprednisolone] Unknown - High Severity     Made his blood sugar get really high, can't take this   • Myrbetriq [Mirabegron] Unknown - High Severity     High BP   • Penicillins Anaphylaxis   • Bee Venom Swelling       Objective   Objective     Vital Signs:   Temp:  [97.5 °F (36.4 °C)] 97.5 °F (36.4 °C)  Heart Rate:  [] 81  Resp:  [16] 16  BP: (144-177)/() 144/86  Flow (L/min):  [2-4] 4       Physical Exam   Constitutional: Awake, alert  Eyes: PERRLA, sclerae anicteric, no conjunctival injection  HENT: NCAT, mucous membranes moist  Neck: Supple, no thyromegaly, no lymphadenopathy, trachea midline  Respiratory: Poor air movement on the right, inspiratory crackles at left lung base, speaking in short phrases  Cardiovascular: RRR, no murmurs, rubs, or gallops, palpable pedal pulses bilaterally  Gastrointestinal: Positive bowel sounds, soft, nontender, nondistended  Musculoskeletal: No bilateral ankle edema, no clubbing or cyanosis to extremities  Psychiatric: Appropriate affect, cooperative  Neurologic: Oriented x 3, strength symmetric in all extremities, Cranial Nerves grossly intact to confrontation, speech clear  Skin: No rashes      Result Review:  I have personally reviewed the results from the time of this admission to 6/3/2022 01:06 EDT and agree with these findings:  [x]  Laboratory  [x]  Microbiology  [x]  Radiology  [x]  EKG/Telemetry   []  Cardiology/Vascular   []  Pathology  [x]  Old records  []  Other:      LAB RESULTS:      Lab 06/02/22  1703   WBC 9.69   HEMOGLOBIN 12.9*   HEMATOCRIT 41.3   PLATELETS 188   NEUTROS ABS 7.05*   IMMATURE GRANS (ABS) 0.22*   LYMPHS ABS 1.30   MONOS ABS 0.70   EOS ABS 0.21   MCV 87.3   CRP <0.30   PROCALCITONIN 0.11    LACTATE 1.6   *         Lab 06/02/22  1703   SODIUM 140   POTASSIUM 4.2   CHLORIDE 102   CO2 27.0   ANION GAP 11.0   BUN 14   CREATININE 1.15   EGFR 62.8   GLUCOSE 277*   CALCIUM 9.2         Lab 06/02/22  1703   TOTAL PROTEIN 7.3   ALBUMIN 4.20   GLOBULIN 3.1   ALT (SGPT) 48*   AST (SGOT) 48*   BILIRUBIN 1.2   ALK PHOS 227*         Lab 06/02/22  2131 06/02/22  1703   PROBNP  --  5,664.0*   TROPONIN T 0.047* 0.041*             Lab 06/02/22  1703   FERRITIN 167.30         Lab 06/02/22  2351   PH, ARTERIAL 7.359   PCO2, ARTERIAL 46.0*   PO2 ART 87.7   FIO2 36   HCO3 ART 25.9   BASE EXCESS ART 0.0   CARBOXYHEMOGLOBIN 0.9     UA    Urinalysis 12/6/21   Specific Gravity, UA 1.007   Ketones, UA Negative   Blood, UA Negative   Leukocytes, UA Negative   Nitrite, UA Negative             Microbiology Results (last 10 days)     Procedure Component Value - Date/Time    COVID PRE-OP / PRE-PROCEDURE SCREENING ORDER (NO ISOLATION) - Swab, Nasopharynx [[id number]]  (Normal) Collected: 06/02/22 1650    Lab Status: Final result Specimen: Swab from Nasopharynx Updated: 06/02/22 1732    Narrative:      The following orders were created for panel order COVID PRE-OP / PRE-PROCEDURE SCREENING ORDER (NO ISOLATION) - Swab, Nasopharynx.  Procedure                               Abnormality         Status                     ---------                               -----------         ------                     COVID-19 and FLU A/B PCR...[[id number]]  Normal              Final result                 Please view results for these tests on the individual orders.    COVID-19 and FLU A/B PCR - Swab, Nasopharynx [[id number]]  (Normal) Collected: 06/02/22 1650    Lab Status: Final result Specimen: Swab from Nasopharynx Updated: 06/02/22 1732     COVID19 Not Detected     Influenza A PCR Not Detected     Influenza B PCR Not Detected    Narrative:      Fact sheet for providers: https://www.fda.gov/media/790282/download    Fact sheet for patients:  https://www.fda.gov/media/370437/download    Test performed by PCR.          XR Chest 1 View    Result Date: 6/2/2022  XR CHEST 1 VW-  Date of Exam: 6/2/2022 4:44 PM  Indication: SOA triage protocol.  Comparison:?12/6/2021  Technique:?A single view of the chest was obtained.  FINDINGS:  ?Patient status post median sternotomy.  Cardiomegaly is stable. Pulmonary vessels are indistinct consistent with pulmonary vascular congestion.  There is hazy perihilar and bibasilar airspace disease favored to be due to pulmonary edema.  There are new small bilateral pleural effusions.        Impression:   1.  Cardiomegaly with pulmonary vascular congestion and bibasilar and perihilar airspace disease favored to be due to pulmonary edema. 2.  Small bilateral pleural effusions.   This report was finalized on 6/2/2022 5:05 PM by Long Ayala MD.      CT Angiogram Chest    Result Date: 6/2/2022  DATE OF EXAM: 6/2/2022 6:33 PM  PROCEDURE: CT ANGIOGRAM CHEST-  INDICATIONS: SOA w/ hx PE and noncompliant with eliquis  COMPARISON: 03/28/2021  TECHNIQUE: Contiguous axial imaging was obtained from the thoracic inlet through the upper abdomen following the intravenous administration of 80 mL of Isovue 370. Reconstructed coronal and sagittal images were also obtained. Automated exposure control and iterative reconstruction methods were used.  The radiation dose reduction device was turned on for each scan per the ALARA (As Low as Reasonably Achievable) protocol.  FINDINGS: Pulmonary arteries: Adequate opacified. Some distortion of peripheral pulmonary arteries due to motion. Within that limitation, no acute emboli demonstrated. Mural calcification of the pulmonary trunk  Mediastinum: Aortic valve and coronary calcifications. No pericardial effusion. No mediastinal adenopathy. Thoracic aorta normal in caliber  Lungs/pleura: Moderate bilateral pleural effusions with secondary atelectasis in the lower lobes. Mild interlobular septal thickening  and peribronchovascular interstitial thickening. Scattered foci of atelectasis in the remaining lungs  Upper abdomen: Nodular contour of the liver  Bones/soft tissues: No acute bony abnormality       Impression:  1. No evidence of acute pulmonary embolism. Mural calcination's of the pulmonary trunk may indicate pulmonary arterial hypertension 2. Findings compatible with interstitial edema and moderate bilateral pleural effusions 3. Cirrhotic morphology of the liver  This report was finalized on 6/2/2022 7:01 PM by Francisco Hummel.        Results for orders placed during the hospital encounter of 01/04/22    Adult Transthoracic Echo Complete W/ Cont if Necessary Per Protocol    Interpretation Summary  · The left ventricular cavity is borderline dilated.  · Left ventricular wall thickness is consistent with mild concentric hypertrophy.  · Estimated left ventricular EF = 52% Left ventricular ejection fraction appears to be 51 - 55%. Left ventricular systolic function is normal.  · Left ventricular diastolic function is consistent with (grade I) impaired relaxation.  · Left atrial volume is moderately increased.  · There is moderate calcification of the aortic valve mainly affecting the non-coronary, left coronary and right coronary cusp(s).  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      Assessment & Plan   Assessment & Plan     Active Hospital Problems    Diagnosis  POA   • **Acute on chronic respiratory failure with hypoxia (HCC) [J96.21]  Unknown   • Elevated troponin [R77.8]  Unknown   • Cirrhosis of liver (HCC) [K74.60]  Unknown   • Elevated transaminase level [R74.01]  Unknown   • Pleural effusion, bilateral [J90]  Unknown   • Normocytic anemia [D64.9]  Unknown   • History of pulmonary embolism [Z86.711]  Unknown   • Acute on chronic congestive heart failure, unspecified heart failure type (HCC) [I50.9]  Yes   • 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) [C83.39]  Yes   • s/p  right nepheroureterectomy and adrenalectomy  [E89.6]  Not Applicable   • Elevated LFTs [R79.89]  Yes   • HLD (hyperlipidemia) [E78.5]  Yes   • S/P AVR [Z95.2]  Not Applicable   • Sleep apnea [G47.30]  Yes   • Aortic stenosis [I35.0]  Yes     status post ROSS procedure, remote, with December 2015 echocardiography revealing normal aortic and pulmonary valve function, normal ejection fraction and mild to moderate MR     • Prostate cancer (HCC) [C61]  Yes              • Essential hypertension [I10]  Yes     Impression: 03/08/2016 - Reviewed outside labs from Jan. Cr 1.3. K 4.2 and sodium 144. Normal cbc.;      • Uncontrolled type 2 diabetes mellitus with hyperglycemia (HCC) [E11.65]  Yes     Impression: 03/08/2016 - seeing Endo .;      Blane Dietz is a 86 y.o. 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    Acute on chronic hypoxic respiratory failure  Acute exacerbation of CHF  Bilateral pleural effusions  Aortic stenosis s/p ROSS procedure  Elevated troponin  HUSSAIN on CPAP  - Patient satting 88% on 2 L, maintaining sats in the mid 90s on 4 L currently  - Given Bumex 0.5 mg IV in ED, will give additional Bumex 1 mg IV twice daily x2 doses  - Defer further IV diuresis to day team  - Nitro drip  -BiPAP  - Strict intake and output, daily weights  - TTE for a.m.  - CTA negative for PE, consistent with volume overload  - May need thoracentesis  - Follows with Dr. Oconnor with cardiology, consult for a.m.  - Elevated troponin, likely demand ischemia.  Trend  - FLP, hemoglobin A1c for a.m.  - Patient  has not been taking home meds recently  - PT/OT  - Case management consult for possible rehab at discharge    Elevated transaminase levels  Cirrhosis  - CT concerning for cirrhosis of the liver, no known history of cirrhosis  - Acute hepatitis panel  - Possibly secondary to CHF  - Liver ultrasound for a.m.    History of pulmonary embolism  - Completed 1 year of Eliquis therapy.  Per Dr. Ramon's last note, patient was instructed to discontinue Eliquis in March.  Patient denies knowledge of this.  However he has not been taking Eliquis recently.  - No PE on CTA  -L>R LE edema, left venous doppler for a.m.    Diffuse large B-cell lymphoma of the right kidney  Prostate cancer s/p prostatectomy  - S/p right nephrectomy, R-CHOP 3 cycles  -Follows with Dr. Ramon outpatient, consider consult    Diabetes mellitus type 2  - Has not been taking insulin for over 10 days.  -Start conservatively with Levemir 20 units nightly with moderate SSI with Accu-Cheks  - Hemoglobin A1c for a.m.    Normocytic anemia  - Stable from prior studies  - A.m. labs    DVT prophylaxis: Lovenox      CODE STATUS: Full code, desires initial resuscitation but would not want prolonged life support  Code Status and Medical Interventions:   Ordered at: 06/03/22 0004     Level Of Support Discussed With:    Patient     Code Status (Patient has no pulse and is not breathing):    CPR (Attempt to Resuscitate)     Medical Interventions (Patient has pulse or is breathing):    Full Support         Courtney Ozuna DO  06/03/22      Electronically signed by Courtney Ozuna DO at 06/03/22 0123       Vital Signs (last day)     Date/Time Temp Temp src Pulse Resp BP Patient Position SpO2    06/09/22 0500 -- -- 62 -- -- -- 96    06/09/22 0400 -- -- 79 -- -- -- 97    06/09/22 0300 -- -- 65 -- -- -- 96    06/09/22 0200 -- -- 58 -- -- -- 98    06/09/22 0100 -- -- 67 -- -- -- 99    06/09/22 0000 98.2 (36.8) Oral 102 20 149/85 Lying 92    06/08/22 2300 -- -- 74 -- -- -- 98     06/08/22 2200 -- -- 64 -- -- -- 93    06/08/22 2100 -- -- 71 -- -- -- 89    06/08/22 2000 -- -- 75 -- -- -- 95    06/08/22 1900 97.9 (36.6) Oral 74 16 138/78 Sitting 97    06/08/22 1700 -- -- 61 -- -- -- 96    06/08/22 1543 97.8 (36.6) Oral 69 16 104/73 Lying 95    06/08/22 1034 98.1 (36.7) Oral 59 22 106/83 Lying 96    06/08/22 1026 -- -- 70 -- -- -- 89    06/08/22 1000 -- -- 91 -- -- -- 93    06/08/22 0915 -- -- 49 -- -- -- 92    06/08/22 0830 -- -- 50 -- -- -- 94    06/08/22 0750 97.6 (36.4) Oral 65 20 92/72 Sitting 94    06/08/22 0700 -- -- 63 -- -- -- 93    06/08/22 0645 -- -- 62 -- -- -- 93    06/08/22 0428 -- -- 59 -- -- -- --    06/08/22 0425 -- -- 48 -- -- -- --    06/08/22 0335 97.3 (36.3) Oral 64 16 102/67 Lying 94    06/08/22 0251 -- -- 57 -- -- -- --    06/08/22 0211 -- -- 74 -- -- -- --    06/08/22 0039 -- -- 67 -- -- -- --            Current Facility-Administered Medications   Medication Dose Route Frequency Provider Last Rate Last Admin   • acetaminophen (TYLENOL) tablet 650 mg  650 mg Oral Q4H PRN Courtney Ozuna DO        Or   • acetaminophen (TYLENOL) 160 MG/5ML solution 650 mg  650 mg Oral Q4H PRN Courtney Ozuna DO        Or   • acetaminophen (TYLENOL) suppository 650 mg  650 mg Rectal Q4H PRN Laith, Courtney G, DO       • allopurinol (ZYLOPRIM) tablet 300 mg  300 mg Oral Daily Courtney Ozuna DO   300 mg at 06/08/22 1033   • amLODIPine (NORVASC) tablet 5 mg  5 mg Oral Daily Courtney Ozuna DO   5 mg at 06/07/22 1314   • sennosides-docusate (PERICOLACE) 8.6-50 MG per tablet 2 tablet  2 tablet Oral BID PRN Kirstin Estevez MD        And   • polyethylene glycol (MIRALAX) packet 17 g  17 g Oral Daily PRN Kirstin Estevez MD        And   • bisacodyl (DULCOLAX) EC tablet 5 mg  5 mg Oral Daily PRN Kirstin Estevez MD        And   • bisacodyl (DULCOLAX) suppository 10 mg  10 mg Rectal Daily PRN Kristin Estevez MD       • dextrose (D50W) (25 g/50 mL) IV injection 25 g  25 g Intravenous Q15 Min PRN Laith  Courtney THOMAS, DO       • dextrose (GLUTOSE) oral gel 15 g  15 g Oral Q15 Min PRN LaithCourtney tyler G, DO       • empagliflozin (JARDIANCE) tablet 10 mg  10 mg Oral Daily Kirstin Estevez MD   10 mg at 22 1048   • fluticasone (FLONASE) 50 MCG/ACT nasal spray 2 spray  2 spray Nasal Daily Courtney Ozuna G, DO   2 spray at 22 0856   • furosemide (LASIX) tablet 40 mg  40 mg Oral Daily Ha Toussaint MD   40 mg at 22 1034   • glucagon (human recombinant) (GLUCAGEN DIAGNOSTIC) injection 1 mg  1 mg Intramuscular Q15 Min PRN Courtney Ozuna G, DO       • heparin (porcine) 5000 UNIT/ML injection 5,000 Units  5,000 Units Subcutaneous Q8H Ada Gautam MD   5,000 Units at 22 0619   • HYDROcodone-acetaminophen (NORCO) 7.5-325 MG per tablet 1 tablet  1 tablet Oral Q6H PRN Courtney Ozuna G, DO       • insulin detemir (LEVEMIR) injection 20 Units  20 Units Subcutaneous Nightly LaithCourtney tyler G, DO   20 Units at 22   • Insulin Lispro (humaLOG) injection 0-9 Units  0-9 Units Subcutaneous TID AC Courtney Ozuna G, DO   2 Units at 22 1857   • melatonin tablet 5 mg  5 mg Oral Nightly PRN Courtney Ozuna G, DO   5 mg at 22   • metoprolol succinate XL (TOPROL-XL) 24 hr tablet 100 mg  100 mg Oral Daily Gila Ozunae G, DO   100 mg at 22 1314   • Pharmacy Consult - MTM   Does not apply Daily Ellen Butler, PharmD       • sacubitril-valsartan (ENTRESTO) 24-26 MG tablet 1 tablet  1 tablet Oral Q12H Ginny Cramer PA   1 tablet at 22 1314   • sodium chloride 0.9 % flush 10 mL  10 mL Intravenous PRN Courtney Ozuna, DO   10 mL at 22        Physician Progress Notes (last 24 hours)      Ada Gautam MD at 22 [facility]              [facility] Medicine Services  PROGRESS NOTE    Patient Name: Blane Dietz  : 1935  MRN: [id number]    Date of Admission: 2022  Primary Care Physician: David Em MD    Subjective   Subjective      CC:  F/U CHF    HPI:  No new issues overnight. Doing well this am. Really anxious to get to inpatient rehab.     ROS:  Gen- No fevers, chills  CV- No chest pain, palpitations  Resp- No cough, dyspnea  GI- No N/V/D, abd pain        Objective   Objective     Vital Signs:   Temp:  [96.8 °F (36 °C)-98.1 °F (36.7 °C)] 98.1 °F (36.7 °C)  Heart Rate:  [48-91] 59  Resp:  [16-22] 22  BP: ()/(56-89) 106/83  Flow (L/min):  [2-3] 3     Physical Exam:  Constitutional: No acute distress, awake, alert  HENT: NCAT, mucous membranes moist  Respiratory: Respiratory effort normal on nasal cannula  Cardiovascular: RRR  Musculoskeletal: Trace bilateral ankle edema  Psychiatric: Appropriate affect, cooperative  Neurologic: Speech clear and fluent, confused at times (doesn't remember that we met yesterday)  Skin: No rashes on exposed surfaces    Results Reviewed:  LAB RESULTS:      Lab 06/08/22  0804 06/03/22  1510 06/02/22  1703   WBC 9.88 11.63* 9.69   HEMOGLOBIN 12.2* 12.2* 12.9*   HEMATOCRIT 38.8 39.8 41.3   PLATELETS 191 210 188   NEUTROS ABS 6.49 8.89* 7.05*   IMMATURE GRANS (ABS) 0.21* 0.17* 0.22*   LYMPHS ABS 1.84 1.22 1.30   MONOS ABS 0.80 1.08* 0.70   EOS ABS 0.31 0.09 0.21   MCV 84.7 87.9 87.3   CRP  --   --  <0.30   PROCALCITONIN  --   --  0.11   LACTATE  --   --  1.6   LDH  --   --  304*         Lab 06/08/22  0804 06/06/22  0801 06/05/22  2351 06/05/22  0854 06/04/22  0801 06/03/22  1510   SODIUM 139 142  --  145 142 141   POTASSIUM 3.7 3.8  --  3.8 4.1 4.2   CHLORIDE 99 101  --  101 99 101   CO2 28.0 31.0*  --  35.0* 31.0* 30.0*   ANION GAP 12.0 10.0  --  9.0 12.0 10.0   BUN 47* 42*  --  36* 28* 21   CREATININE 1.45* 1.40*  --  1.41* 1.36* 1.28*   EGFR 47.5* 49.6*  --  49.1* 51.3* 55.2*   GLUCOSE 83 94  --  147* 138* 127*   CALCIUM 9.5 9.6  --  9.5 9.1 10.1   MAGNESIUM  --   --  2.1  --   --  2.0   HEMOGLOBIN A1C  --   --   --   --   --  9.10*   TSH  --   --   --   --   --  1.580         Lab 06/02/22  1703   TOTAL  PROTEIN 7.3   ALBUMIN 4.20   GLOBULIN 3.1   ALT (SGPT) 48*   AST (SGOT) 48*   BILIRUBIN 1.2   ALK PHOS 227*         Lab 06/03/22  1510 06/02/22  2131 06/02/22  1703   PROBNP  --   --  5,664.0*   TROPONIN T 0.052* 0.047* 0.041*         Lab 06/03/22  1510   CHOLESTEROL 169   LDL CHOL 78   HDL CHOL 74*   TRIGLYCERIDES 97         Lab 06/02/22  1703   FERRITIN 167.30         Lab 06/02/22  2351   PH, ARTERIAL 7.359   PCO2, ARTERIAL 46.0*   PO2 ART 87.7   FIO2 36   HCO3 ART 25.9   BASE EXCESS ART 0.0   CARBOXYHEMOGLOBIN 0.9     Brief Urine Lab Results  (Last result in the past 365 days)      Color   Clarity   Blood   Leuk Est   Nitrite   Protein   CREAT   Urine HCG        12/06/21 0908 Yellow   Clear   Negative   Negative   Negative   Negative                 Microbiology Results Abnormal     Procedure Component Value - Date/Time    COVID PRE-OP / PRE-PROCEDURE SCREENING ORDER (NO ISOLATION) - Swab, Nasopharynx [[id number]]  (Normal) Collected: 06/02/22 1650    Lab Status: Final result Specimen: Swab from Nasopharynx Updated: 06/02/22 1732    Narrative:      The following orders were created for panel order COVID PRE-OP / PRE-PROCEDURE SCREENING ORDER (NO ISOLATION) - Swab, Nasopharynx.  Procedure                               Abnormality         Status                     ---------                               -----------         ------                     COVID-19 and FLU A/B PCR...[[id number]]  Normal              Final result                 Please view results for these tests on the individual orders.    COVID-19 and FLU A/B PCR - Swab, Nasopharynx [[id number]]  (Normal) Collected: 06/02/22 1650    Lab Status: Final result Specimen: Swab from Nasopharynx Updated: 06/02/22 1732     COVID19 Not Detected     Influenza A PCR Not Detected     Influenza B PCR Not Detected    Narrative:      Fact sheet for providers: https://www.fda.gov/media/019104/download    Fact sheet for patients:  https://www.fda.gov/media/342250/download    Test performed by PCR.          No radiology results from the last 24 hrs    Results for orders placed during the hospital encounter of 06/02/22    Adult Transthoracic Echo Complete w/ Color, Spectral and Contrast if necessary per protocol    Interpretation Summary  · There is a left pleural effusion. There is a right pleural effusion.  · Left ventricular wall thickness is consistent with hypertrophy.  · The right ventricular cavity is mildly dilated.  · Left atrial volume is mildly increased.  · The right atrial cavity is dilated.  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  · There is a bioprosthetic aortic valve present.  · Peak velocity of the flow distal to the aortic valve is 105.1 cm/s. Aortic valve mean pressure gradient is 3 mmHg.  · Aortic valve area is 2.9 cm2.  · Moderate aortic valve regurgitation is present.  · Moderate tricuspid valve regurgitation is present.  · Estimated right ventricular systolic pressure from tricuspid regurgitation is moderately elevated (45-55 mmHg). Calculated right ventricular systolic pressure from tricuspid regurgitation is 52 mmHg.  · Ejection fraction is low normal      I have reviewed the medications:  Scheduled Meds:allopurinol, 300 mg, Oral, Daily  amLODIPine, 5 mg, Oral, Daily  empagliflozin, 10 mg, Oral, Daily  fluticasone, 2 spray, Nasal, Daily  furosemide, 40 mg, Oral, Daily  heparin (porcine), 5,000 Units, Subcutaneous, Q8H  insulin detemir, 20 Units, Subcutaneous, Nightly  insulin lispro, 0-9 Units, Subcutaneous, TID AC  metoprolol succinate XL, 100 mg, Oral, Daily  pharmacy consult - MTM, , Does not apply, Daily  sacubitril-valsartan, 1 tablet, Oral, Q12H      Continuous Infusions:   PRN Meds:.•  acetaminophen **OR** acetaminophen **OR** acetaminophen  •  senna-docusate sodium **AND** polyethylene glycol **AND** bisacodyl **AND** bisacodyl  •  dextrose  •  dextrose  •  glucagon (human recombinant)  •   HYDROcodone-acetaminophen  •  melatonin  •  sodium chloride    Assessment & Plan   Assessment & Plan     Active Hospital Problems    Diagnosis  POA   • **Acute on chronic respiratory failure with hypoxia (HCC) [J96.21]  Unknown   • Elevated troponin [R77.8]  Unknown   • Cirrhosis of liver (HCC) [K74.60]  Unknown   • Elevated transaminase level [R74.01]  Unknown   • Pleural effusion, bilateral [J90]  Unknown   • Normocytic anemia [D64.9]  Unknown   • History of pulmonary embolism [Z86.711]  Unknown   • Acute on chronic congestive heart failure, unspecified heart failure type (HCC) [I50.9]  Yes   • 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        Brief Hospital Course to date:  Blane Dietz is a 86 y.o. 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    This patient's problems and plans were partially entered by my partner and updated as appropriate by me 06/08/22.    Acute on chronic hypoxic respiratory failure  Acute exacerbation of diastolic CHF  Bilateral pleural effusions  Aortic stenosis s/p ROSS procedure  Elevated troponin  HUSSAIN on CPAP  --Likely due to medication  non-adherence  --S/P IV diuresis  --Cardiology following, transitioned to PO lasix, started entresto. Held this am due to hypotension (again)  --Continue home jardiance  -- Cr stable on last check     Elevated transaminase levels  Cirrhosis  Liver lesion  -Imaging concerning for cirrhosis with lesion in left lobe of liver  --Will need additional imaging with MRI or CT liver mass protocol as an outpatient  --Outpatient GI follow up-will place referral at discharge.  Discussed with patient.     History of pulmonary embolism  -Completed 1 year of Eliquis therapy.  Per Dr. Ramon's last note, patient was instructed to discontinue Eliquis in March.    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 Ramon outpatient     Diabetes mellitus type 2  -Has not been taking insulin for over 10 days.  -Contininue Levemir 20 units nightly with moderate SSI, jardiance  -Last A1c was 8.8%    DVT prophylaxis:  Medical DVT prophylaxis orders are present.       AM-PAC 6 Clicks Score (PT): 19 (22 0900)    Disposition: I expect the patient to be discharged pending rehab placement.    CODE STATUS:   Code Status and Medical Interventions:   Ordered at: 22 0004     Level Of Support Discussed With:    Patient     Code Status (Patient has no pulse and is not breathing):    CPR (Attempt to Resuscitate)     Medical Interventions (Patient has pulse or is breathing):    Full Support       Ada Gautam MD  22              Electronically signed by Ada Gautam MD at 22 2657          Physical Therapy Notes (most recent note)      Sophia Pina, PT at 22 5566  Version 1 of 1         Patient Name: Blane Dietz  : 1935    MRN: [id number]                              Today's Date: 6/3/2022       Admit Date: 2022    Visit Dx:     ICD-10-CM ICD-9-CM   1.  Acute on chronic congestive heart failure, unspecified heart failure type (HCC)  I50.9 428.0   2. Hypoxemia requiring supplemental oxygen  R09.02 799.02    Z99.81    3. Diabetes mellitus type 2 in obese (HCC)  E11.69 250.00    E66.9 278.00   4. Elevated blood pressure reading with diagnosis of hypertension  I10 401.9   5. 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)   • PICC (peripherally inserted central catheter) flush   • Acute on chronic congestive heart failure, unspecified heart failure type (HCC)   • Elevated troponin   • Cirrhosis of liver (HCC)   • Elevated transaminase level   • Pleural effusion, bilateral   • Normocytic anemia   • History of pulmonary embolism     Past Medical History:   Diagnosis Date   • Aortic stenosis     status post ROSS procedure, remote, with December 2015 echocardiography revealing normal aortic and pulmonary valve function, normal ejection fraction and mild to moderate MR   • Arthritis    • Bilateral impacted cerumen 11/23/2016   • Bronchitis    • Chronic gout of right foot 6/13/2016    Impression: 03/08/2016 - stable.;    • COVID-19 vaccine series completed 05/12/2021    [place] 2nd dose 3/12/21.   • Depression    • Diabetes mellitus (HCC)    • Diverticulitis    • Exogenous obesity    • Gout    • Heart murmur    • History of vitamin D deficiency    • Hypercholesteremia 8/11/2016   • Hyperlipidemia    •  Hypertension    • Kidney lesion    • Malignant neoplasm of prostate (HCC)    • Morbid obesity (HCC) 8/11/2016   • Pneumonia 05/12/2021   • Primary osteoarthritis involving multiple joints 6/13/2016    Impression: 03/08/2016 - stable;    • Pulmonary embolism (HCC)    • Sleep apnea 05/12/2021    C-Pap   • Uncontrolled type 2 diabetes mellitus with hyperglycemia (HCC) 6/13/2016    Impression: 03/08/2016 - seeing Endo .;    • Vertigo      Past Surgical History:   Procedure Laterality Date   • CARDIAC VALVE REPLACEMENT  05/12/2021    Ross procedure 22 years ago   • COLON SURGERY     • COLONOSCOPY     • COLOSTOMY  1999   • COLOSTOMY REVISION     • CYSTOSCOPY N/A 12/7/2021    Procedure: CYSTOSCOPY FLEXIBLE;  Surgeon: José Miguel Villafuerte Jr., MD;  Location:  [facility];  Service: Urology;  Laterality: N/A;   • EXPLORATORY LAPAROTOMY      With Tissue Removal   • LIPOMA EXCISION     • MOHS SURGERY     • NEPHROURETERECTOMY Right 12/7/2021    Procedure: RIGHT NEPHROURETERECTOMY LAPAROSCOPIC WITH DAVINCI ROBOT;  Surgeon: José Miguel Villafuerte Jr., MD;  Location:  [facility];  Service: Robotics - DaVinci;  Laterality: Right;   • OTHER SURGICAL HISTORY      Porcine Valve   • PROSTATE SURGERY     • PROSTATECTOMY  2000     History of Prostatectomy Perineal Radical   • SKIN CANCER EXCISION      from head and lip   • TONSILLECTOMY        General Information     Row Name 06/03/22 1535          Physical Therapy Time and Intention    Document Type evaluation  -SS     Mode of Treatment physical therapy  -SS     Row Name 06/03/22 1535          General Information    Patient Profile Reviewed yes  -SS     Prior Level of Function independent:;all household mobility;gait;transfer;bed mobility;driving  RW, cane PRN, O2 at baseline  -SS     Existing Precautions/Restrictions fall;oxygen therapy device and L/min  -SS     Barriers to Rehab medically complex  -SS     Row Name 06/03/22 1537          Living Environment    People in Home alone;other (see  comments)  anticipates moving into [facility] in July 2022  -     Row Name 06/03/22 1535          Home Main Entrance    Number of Stairs, Main Entrance other (see comments)  ramp  -     Row Name 06/03/22 1535          Stairs Within Home, Primary    Number of Stairs, Within Home, Primary none  -     Row Name 06/03/22 1535          Cognition    Orientation Status (Cognition) oriented x 4  -     Row Name 06/03/22 1535          Safety Issues, Functional Mobility    Safety Issues Affecting Function (Mobility) awareness of need for assistance;insight into deficits/self-awareness;positioning of assistive device;problem-solving;safety precaution awareness;safety precautions follow-through/compliance;sequencing abilities  -     Impairments Affecting Function (Mobility) balance;endurance/activity tolerance;shortness of breath;strength;postural/trunk control  -           User Key  (r) = Recorded By, (t) = Taken By, (c) = Cosigned By    Initials Name Provider Type     Sophia Pina, PT Physical Therapist               Mobility     Row Name 06/03/22 1537          Bed Mobility    Comment, (Bed Mobility) up in chair  -[facility] Name 06/03/22 1537          Sit-Stand Transfer    Sit-Stand [place] (Transfers) contact guard;verbal cues  -     Assistive Device (Sit-Stand Transfers) walker, front-wheeled  -     Comment, (Sit-Stand Transfer) VC for hand placement, appropriate alignment, lowering with eccentric control  -     Row Name 06/03/22 1537          Gait/Stairs (Locomotion)    [place] Level (Gait) contact guard;verbal cues  -     Assistive Device (Gait) walker, front-wheeled  -     Distance in Feet (Gait) 40  -     Deviations/Abnormal Patterns (Gait) bilateral deviations;binta decreased;gait speed decreased;stride length decreased  -     Bilateral Gait Deviations forward flexed posture;heel strike decreased  -     Comment, (Gait/Stairs) Pt. ambulated with a step through gait pattern. VC for  upright posture, safety recommendations. Gait limited by fatigue. O2 desat to 88% on 2L nasal cannula.  -           User Key  (r) = Recorded By, (t) = Taken By, (c) = Cosigned By    Initials Name Provider Type    SS Sophia Pina PT Physical Therapist               Obj/Interventions     Row Name 06/03/22 1543          Range of Motion Comprehensive    General Range of Motion bilateral lower extremity ROM WFL  -     Row Name 06/03/22 1543          Strength Comprehensive (MMT)    Comment, General Manual Muscle Testing (MMT) Assessment BLE gross 4/5  -     Row Name 06/03/22 1543          Motor Skills    Motor Skills functional endurance  -     Functional Endurance moderate impairment  -     Row Name 06/03/22 1543          Balance    Balance Assessment sitting static balance;sitting dynamic balance;sit to stand dynamic balance;standing static balance;standing dynamic balance  -     Static Sitting Balance independent  -     Dynamic Sitting Balance supervision  -     Position, Sitting Balance unsupported;sitting in chair  -     Sit to Stand Dynamic Balance contact guard  -     Static Standing Balance contact guard  -SS     Dynamic Standing Balance contact guard  -SS     Position/Device Used, Standing Balance supported;walker, front-wheeled  -     Balance Interventions sitting;standing;sit to stand;supported;static;dynamic  -     Row Name 06/03/22 1543          Sensory Assessment (Somatosensory)    Sensory Assessment (Somatosensory) LE sensation intact  -           User Key  (r) = Recorded By, (t) = Taken By, (c) = Cosigned By    Initials Name Provider Type     Sophia Pina, PT Physical Therapist               Goals/Plan     Row Name 06/03/22 1546          Bed Mobility Goal 1 (PT)    Activity/Assistive Device (Bed Mobility Goal 1, PT) bed mobility activities, all  -SS     [place] Level/Cues Needed (Bed Mobility Goal 1, PT) independent  -SS     Time Frame (Bed Mobility Goal 1, PT) long  term goal (LTG);10 days  -SS     Row Name 06/03/22 1546          Transfer Goal 1 (PT)    Activity/Assistive Device (Transfer Goal 1, PT) sit-to-stand/stand-to-sit;bed-to-chair/chair-to-bed  -SS     [place] Level/Cues Needed (Transfer Goal 1, PT) independent  -SS     Time Frame (Transfer Goal 1, PT) long term goal (LTG);10 days  -SS     Row Name 06/03/22 1546          Gait Training Goal 1 (PT)    Activity/Assistive Device (Gait Training Goal 1, PT) gait (walking locomotion);assistive device use;walker, rolling  -SS     [place] Level (Gait Training Goal 1, PT) modified independence  -SS     Distance (Gait Training Goal 1, PT) 150  -SS     Time Frame (Gait Training Goal 1, PT) long term goal (LTG);10 days  -     Row Name 06/03/22 1546          Therapy Assessment/Plan (PT)    Planned Therapy Interventions (PT) balance training;bed mobility training;gait training;home exercise program;neuromuscular re-education;patient/family education;postural re-education;ROM (range of motion);strengthening;stretching;transfer training  -           User Key  (r) = Recorded By, (t) = Taken By, (c) = Cosigned By    Initials Name Provider Type    SS Sophia Pina, PT Physical Therapist               Clinical Impression     Row Name 06/03/22 1544          Pain    Pretreatment Pain Rating 0/10 - no pain  -     Posttreatment Pain Rating 0/10 - no pain  -SS     Pain Intervention(s) Repositioned;Ambulation/increased activity  -     Additional Documentation Pain Scale: Numbers Pre/Post-Treatment (Group)  -     Row Name 06/03/22 1546          Plan of Care Review    Plan of Care Reviewed With patient  -SS     Outcome Evaluation Pt. presents with generalized weakness, balance impairments and decreased activity tolerance affecting his ability to safely participate in functional mobility. He performed sit to stand transfer w/contact guard assist. He ambulated 40' w/front wheeled walker, contact guard assist. Gait limited by  fatigue. O2 desat to 88% on 2L nasal cannula. Recommend inpatient rehab upon discharge.  -     Row Name 06/03/22 1544          Therapy Assessment/Plan (PT)    Rehab Potential (PT) good, to achieve stated therapy goals  -     Criteria for Skilled Interventions Met (PT) yes;meets criteria;skilled treatment is necessary  -     Therapy Frequency (PT) daily  -     Row Name 06/03/22 1544          Vital Signs    Pre Systolic BP Rehab 128  -SS     Pre Treatment Diastolic BP 70  -SS     Pretreatment Heart Rate (beats/min) 81  -SS     Posttreatment Heart Rate (beats/min) 83  -SS     Pre SpO2 (%) 91  -SS     O2 Delivery Pre Treatment room air  -     Intra SpO2 (%) 88  -SS     O2 Delivery Intra Treatment nasal cannula  2L  -SS     Post SpO2 (%) 95  -SS     O2 Delivery Post Treatment nasal cannula  2L  -SS     Pre Patient Position Sitting  -SS     Intra Patient Position Standing  -SS     Post Patient Position Sitting  -     Row Name 06/03/22 1544          Positioning and Restraints    Pre-Treatment Position sitting in chair/recliner  -SS     Post Treatment Position chair  -SS     In Chair notified nsg;reclined;call light within reach;encouraged to call for assist;exit alarm on;waffle cushion;legs elevated  -           User Key  (r) = Recorded By, (t) = Taken By, (c) = Cosigned By    Initials Name Provider Type     Sophia Pina, PT Physical Therapist               Outcome Measures     Row Name 06/03/22 1547 06/03/22 0815       How much help from another person do you currently need...    Turning from your back to your side while in flat bed without using bedrails? 3  -SS 4  -EO    Moving from lying on back to sitting on the side of a flat bed without bedrails? 3  -SS 4  -EO    Moving to and from a bed to a chair (including a wheelchair)? 3  -SS 3  -EO    Standing up from a chair using your arms (e.g., wheelchair, bedside chair)? 3  -SS 3  -EO    Climbing 3-5 steps with a railing? 3  -SS 3  -EO    To walk in  hospital room? 3  -SS 3  -EO    AM-PAC 6 Clicks Score (PT) 18  -SS 20  -EO    Highest level of mobility 6 --> Walked 10 steps or more  - 6 --> Walked 10 steps or more  -    Row Name 06/03/22 1547          Functional Assessment    Outcome Measure Options AM-PAC 6 Clicks Basic Mobility (PT)  -           User Key  (r) = Recorded By, (t) = Taken By, (c) = Cosigned By    Initials Name Provider Type    EO Mackenzie Chavez RN Registered Nurse    Sophia David, PT Physical Therapist                             Physical Therapy Education                 Title: PT OT SLP Therapies (In Progress)     Topic: Physical Therapy (In Progress)     Point: Mobility training (Done)     Learning Progress Summary           Patient Mayurer, MARILIN, VU,NR by  at 6/3/2022 1547    Comment: Educated pt. safety/technique with transfers, ambulation, PT POC                   Point: Home exercise program (Not Started)     Learner Progress:  Not documented in this visit.          Point: Body mechanics (Done)     Learning Progress Summary           Patient MARILIN Lara, VU,NR by  at 6/3/2022 1547    Comment: Educated pt. safety/technique with transfers, ambulation, PT POC                   Point: Precautions (Done)     Learning Progress Summary           Patient MARILIN Lara, VU,NR by  at 6/3/2022 1547    Comment: Educated pt. safety/technique with transfers, ambulation, PT POC                               User Key     Initials Effective Dates Name Provider Type Discipline     06/01/21 -  Sophia Pina, PT Physical Therapist PT              PT Recommendation and Plan  Planned Therapy Interventions (PT): balance training, bed mobility training, gait training, home exercise program, neuromuscular re-education, patient/family education, postural re-education, ROM (range of motion), strengthening, stretching, transfer training  Plan of Care Reviewed With: patient  Outcome Evaluation: Pt. presents with generalized weakness, balance impairments and  decreased activity tolerance affecting his ability to safely participate in functional mobility. He performed sit to stand transfer w/contact guard assist. He ambulated 40' w/front wheeled walker, contact guard assist. Gait limited by fatigue. O2 desat to 88% on 2L nasal cannula. Recommend inpatient rehab upon discharge.     Time Calculation:    PT Charges     Row Name 22 1548             Time Calculation    Start Time 1456  -SS      Stop Time 1506  -SS      Time Calculation (min) 10 min  -SS      PT Received On 22  -SS      PT Goal Re-Cert Due Date 22  -SS              Time Calculation- PT    Total Timed Code Minutes- PT 10 minute(s)  -SS              Untimed Charges    PT Eval/Re-eval Minutes 50  -SS              Total Minutes    Untimed Charges Total Minutes 50  -SS       Total Minutes 50  -SS            User Key  (r) = Recorded By, (t) = Taken By, (c) = Cosigned By    Initials Name Provider Type    SS Sophia Pina, PT Physical Therapist              Therapy Charges for Today     Code Description Service Date Service Provider Modifiers Qty    33533069729 HC PT EVAL LOW COMPLEXITY 4 6/3/2022 Sophia Pina, PT GP 1          PT G-Codes  Outcome Measure Options: AM-PAC 6 Clicks Basic Mobility (PT)  AM-PAC 6 Clicks Score (PT): 18    Sophia Pina PT  6/3/2022      Electronically signed by Sophia Pina, PT at 22 1549          Occupational Therapy Notes (most recent note)      Olimpia Duggan, OT at 22 0921          Patient Name: Blane Dietz  : 1935    MRN: [id number]                              Today's Date: 2022       Admit Date: 2022    Visit Dx:     ICD-10-CM ICD-9-CM   1. Acute on chronic congestive heart failure, unspecified heart failure type (HCC)  I50.9 428.0   2. Hypoxemia requiring supplemental oxygen  R09.02 799.02    Z99.81    3. Diabetes mellitus type 2 in obese (HCC)  E11.69 250.00    E66.9 278.00   4. Elevated blood pressure reading with diagnosis of  hypertension  I10 401.9   5. 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)   • PICC (peripherally inserted central catheter) flush   • Acute on chronic congestive heart failure, unspecified heart failure type (HCC)   • Elevated troponin   • Cirrhosis of liver (HCC)   • Elevated transaminase level   • Pleural effusion, bilateral   • Normocytic anemia   • History of pulmonary embolism     Past Medical History:   Diagnosis Date   • Aortic stenosis     status post ROSS procedure, remote, with December 2015 echocardiography revealing normal aortic and pulmonary valve function, normal ejection fraction and mild to moderate MR   • Arthritis    • Bilateral impacted cerumen 11/23/2016   • Bronchitis    • Chronic gout of right foot 6/13/2016    Impression: 03/08/2016 - stable.;    • COVID-19 vaccine series completed 05/12/2021    [place] 2nd dose 3/12/21.   • Depression    • Diabetes mellitus (HCC)    • Diverticulitis    • Exogenous obesity    • Gout    • Heart murmur    • History of vitamin D deficiency    • Hypercholesteremia 8/11/2016   • Hyperlipidemia    • Hypertension    • Kidney lesion    • Malignant neoplasm of prostate (HCC)    • Morbid obesity (HCC) 8/11/2016   • Pneumonia 05/12/2021   • Primary osteoarthritis involving multiple joints 6/13/2016    Impression: 03/08/2016 - stable;    • Pulmonary embolism (HCC)    • Sleep apnea 05/12/2021    C-Pap   •  Uncontrolled type 2 diabetes mellitus with hyperglycemia (HCC) 6/13/2016    Impression: 03/08/2016 - seeing Endo .;    • Vertigo      Past Surgical History:   Procedure Laterality Date   • CARDIAC VALVE REPLACEMENT  05/12/2021    Ross procedure 22 years ago   • COLON SURGERY     • COLONOSCOPY     • COLOSTOMY  1999   • COLOSTOMY REVISION     • CYSTOSCOPY N/A 12/7/2021    Procedure: CYSTOSCOPY FLEXIBLE;  Surgeon: José Miguel Villafuerte Jr., MD;  Location: [facility] OR;  Service: Urology;  Laterality: N/A;   • EXPLORATORY LAPAROTOMY      With Tissue Removal   • LIPOMA EXCISION     • MOHS SURGERY     • NEPHROURETERECTOMY Right 12/7/2021    Procedure: RIGHT NEPHROURETERECTOMY LAPAROSCOPIC WITH DAVINCI ROBOT;  Surgeon: José Miguel Villafuerte Jr., MD;  Location:  [facility] OR;  Service: Robotics - DaVinci;  Laterality: Right;   • OTHER SURGICAL HISTORY      Porcine Valve   • PROSTATE SURGERY     • PROSTATECTOMY  2000     History of Prostatectomy Perineal Radical   • SKIN CANCER EXCISION      from head and lip   • TONSILLECTOMY        General Information     Row Name 06/07/22 1040          OT Time and Intention    Document Type therapy note (daily note)  -JY     Mode of Treatment occupational therapy;individual therapy  -JY     Row Name 06/07/22 1040          General Information    Patient Profile Reviewed yes  -JY     Existing Precautions/Restrictions fall;oxygen therapy device and L/min  -JY     Barriers to Rehab medically complex  -JY     Row Name 06/07/22 1040          Cognition    Orientation Status (Cognition) oriented x 4  -JY     Row Name 06/07/22 1040          Safety Issues, Functional Mobility    Safety Issues Affecting Function (Mobility) awareness of need for assistance;insight into deficits/self-awareness;judgment;problem-solving;safety precaution awareness;safety precautions follow-through/compliance;sequencing abilities  -JY     Impairments Affecting Function (Mobility) balance;endurance/activity tolerance;shortness  "of breath;strength;postural/trunk control;cognition  -JY     Cognitive Impairments, Mobility Safety/Performance insight into deficits/self-awareness;judgment;problem-solving/reasoning;safety precaution awareness;safety precaution follow-through;sequencing abilities;other (see comments)  pt with very brief, intermittent confusion referencing being \"here\" in [place] vs [place] when just prior stating anticipated d/c to [place]  -SARA     Comment, Safety Issues/Impairments (Mobility) pt alert and able to follow commands, pt req'd initial motivational cues, education on OT purpose to complete progressive mobility OOB and ADLs as pt noted, \"likely discharging home or to rehab and don't really need to do this  -SARA           User Key  (r) = Recorded By, (t) = Taken By, (c) = Cosigned By    Initials Name Provider Type    Olimpia Cook, OT Occupational Therapist                 Mobility/ADL's     Row Name 06/07/22 1045          Bed Mobility    Bed Mobility supine-sit;sit-supine;scooting/bridging  -JY     Scooting/Bridging [place] (Bed Mobility) supervision;verbal cues  -JY     Supine-Sit [place] (Bed Mobility) contact guard;verbal cues  -JY     Sit-Supine [place] (Bed Mobility) contact guard;verbal cues  -JY     Assistive Device (Bed Mobility) head of bed elevated;bed rails  -JKATHLEEN     Comment, (Bed Mobility) pt demonstrated need for gross CGA for bed mobility, largely for lattermost uprighting of trunk into sitting and to elevate LEs back to bed height in return to supine; positioned in sidelying L to support hx of R nephrectomy; denied any dizziness or feeling LH upon sitting, SPO2 maintained >/= 90% with supplemental O2  -JKATHLEEN     Row Name 06/07/22 1045          Transfers    Transfers sit-stand transfer;stand-sit transfer  -JY     Comment, (Transfers) skilled cues for optimal hand placement for controlled ascend, descend specifically to reach back prior to sitting and pushing up from seated surfaces vs " grasping/pulling at FWW; pt did not require significant physical A yet req'd said cues for safety  -JY     Sit-Stand [place] (Transfers) supervision;verbal cues  -JY     Stand-Sit [place] (Transfers) contact guard;verbal cues  -JY     Row Name 06/07/22 1045          Sit-Stand Transfer    Assistive Device (Sit-Stand Transfers) walker, front-wheeled  -SARA     Row Name 06/07/22 1045          Stand-Sit Transfer    Assistive Device (Stand-Sit Transfers) walker, front-wheeled  -SARA     Row Name 06/07/22 1045          Functional Mobility    Functional Mobility- Comment defer to PT for specifics regarding fxl ambulation, pt demonstrated need for CGA with FWW during lateral stepping to move self toward HOB for improved positioning once seated, v/c for optimal placement of FWW with self as steps advanced  -KATHLEEN     [facility] Name 06/07/22 1045          Activities of Daily Living    BADL Assessment/Intervention lower body dressing;upper body dressing;grooming  -[facility] Name 06/07/22 1045          Lower Body Dressing Assessment/Training    [place] Level (Lower Body Dressing) doff;don;socks;supervision;verbal cues  -JY     Position (Lower Body Dressing) edge of bed sitting  -JY     Comment, (Lower Body Dressing) pt able to demo figure 4 position while seated to reach feet more proximally and d/d without physical A  -JY     [facility] Name 06/07/22 1045          Upper Body Dressing Assessment/Training    [place] Level (Upper Body Dressing) doff;don;pajama/robe;minimum assist (75% patient effort);verbal cues  -JY     Position (Upper Body Dressing) unsupported sitting;edge of bed sitting  -JY     Comment, (Upper Body Dressing) min A for grossly proximal and posterior management of gown and posterior tying/untying of gown  -JY     Row Name 06/07/22 1045          Grooming Assessment/Training    [place] Level (Grooming) wash face, hands;standby assist;verbal cues  -JY     Position (Grooming) supported standing;sink side   -JY     Comment, (Grooming) gross SBA for face/hand washing tasks, CGA for standing balance while pt stood sink side  -JY           User Key  (r) = Recorded By, (t) = Taken By, (c) = Cosigned By    Initials Name Provider Type    Olimpia Cook OT Occupational Therapist               Obj/Interventions     Row Name 06/07/22 1053          Motor Skills    Motor Skills functional endurance  -JY     Functional Endurance pt presented this date with standing activity tolerance of ~ 5 minutes while supported with FWW and completing functional tasks with SPO2 grossly >/= 90% while monitored, pt denied any SOA yet did seek seated rest break after standing trial  -JY     Row Name 06/07/22 1053          Balance    Balance Assessment sitting static balance;sitting dynamic balance;standing static balance;standing dynamic balance  -JY     Static Sitting Balance independent  -JY     Dynamic Sitting Balance supervision;other (see comments)  LBD  -JY     Static Standing Balance supervision;verbal cues  -JY     Dynamic Standing Balance contact guard;verbal cues  -JY     Position/Device Used, Standing Balance supported;walker, front-wheeled  -JY     Balance Interventions sitting;standing;static;dynamic;sit to stand;supported;occupation based/functional task  -JY     Comment, Balance no overt LOB during seated or standing tasks, utilized FWW throughout standing for support, able to complete more dynamic LBD (d/d socks) while unsupported at EOB this date  -JY           User Key  (r) = Recorded By, (t) = Taken By, (c) = Cosigned By    Initials Name Provider Type    Olimpia Cook OT Occupational Therapist               Goals/Plan    No documentation.                Clinical Impression     Row Name 06/07/22 1057          Pain Assessment    Pretreatment Pain Rating 0/10 - no pain  -JY     Posttreatment Pain Rating 0/10 - no pain  -JY     Pre/Posttreatment Pain Comment denies any pain and tolerated OT interventions  -JY     Pain  Intervention(s) Repositioned;Ambulation/increased activity  -JY     Row Name 06/07/22 1057          Plan of Care Review    Plan of Care Reviewed With patient  -JY     Progress improving  -JY     Outcome Evaluation Pt demonstrates improved performance in ADLs, activity tolerance toward more dynamic tasks both in sitting EOB and standing at sink side and fxl transfers to achieve different positions after initial motivational cues/education for change of position. Pt demonstrated supine<> sitting with CGA, spv for scooting to EOB, spv for STS at EOB and CGA for standing sink side and lateral stepping for positioning with FWW. Pt at EOB and unsupported able to complete d/d socks with spv, d/d gown with min A and wash face/hands with SBA while standing with CGA for balance at sink. Pt stood for increased ~ 5 mins w/ SPO2 >/= 90% throughout with supplemental O2, rest break after standing tolerance yet with pt reporting decreased perceived exertion/SOA. Will progress pt as able during hospitalization.  -JKATHLEEN     Row Name 06/07/22 1057          Vital Signs    Pre Systolic BP Rehab --  u/a to obtain BP w/ error in reading, RN aware  -JY     Pretreatment Heart Rate (beats/min) 69  -JY     Posttreatment Heart Rate (beats/min) 74  -JY     Pre SpO2 (%) 96  -JY     O2 Delivery Pre Treatment supplemental O2  -JY     Intra SpO2 (%) 91  -JY     O2 Delivery Intra Treatment supplemental O2  -JY     Post SpO2 (%) 94  -JY     O2 Delivery Post Treatment supplemental O2  -JY     Pre Patient Position Supine  -JY     Intra Patient Position Standing  -JY     Post Patient Position Supine  -JY     Row Name 06/07/22 1057          Positioning and Restraints    Pre-Treatment Position in bed  -JY     Post Treatment Position bed  -JY     In Bed notified nsg;side lying left;call light within reach;encouraged to call for assist;exit alarm on;side rails up x2  -JY           User Key  (r) = Recorded By, (t) = Taken By, (c) = Cosigned By    Initials Name  Provider Type    Olimipa Coko OT Occupational Therapist               Outcome Measures     Row Name 06/07/22 1107          How much help from another is currently needed...    Putting on and taking off regular lower body clothing? 3  -JY     Bathing (including washing, rinsing, and drying) 3  -JY     Toileting (which includes using toilet bed pan or urinal) 3  -JY     Putting on and taking off regular upper body clothing 3  -JY     Taking care of personal grooming (such as brushing teeth) 3  -JY     Eating meals 3  -JY     AM-PAC 6 Clicks Score (OT) 18  -JY     Row Name 06/07/22 1107          Functional Assessment    Outcome Measure Options AM-PAC 6 Clicks Daily Activity (OT)  -JY           User Key  (r) = Recorded By, (t) = Taken By, (c) = Cosigned By    Initials Name Provider Type    Olimpia Cook OT Occupational Therapist                Occupational Therapy Education                 Title: PT OT SLP Therapies (In Progress)     Topic: Occupational Therapy (In Progress)     Point: ADL training (In Progress)     Description:   Instruct learner(s) on proper safety adaptation and remediation techniques during self care or transfers.   Instruct in proper use of assistive devices.              Learning Progress Summary           Patient Acceptance, E,D, NR by SARA at 6/7/2022 0921    Acceptance, E,D, VU,DU,NR by TORSTEN at 6/3/2022 1538    Comment: PLB, safety awareness w/ AD                   Point: Home exercise program (Not Started)     Description:   Instruct learner(s) on appropriate technique for monitoring, assisting and/or progressing therapeutic exercises/activities.              Learner Progress:  Not documented in this visit.          Point: Precautions (In Progress)     Description:   Instruct learner(s) on prescribed precautions during self-care and functional transfers.              Learning Progress Summary           Patient Acceptance, E,D, NR by SARA at 6/7/2022 0921    Acceptance, E,D, VU,DU,NR by TORSTEN  at 6/3/2022 1538    Comment: PLB, safety awareness w/ AD                   Point: Body mechanics (In Progress)     Description:   Instruct learner(s) on proper positioning and spine alignment during self-care, functional mobility activities and/or exercises.              Learning Progress Summary           Patient Acceptance, E,D, NR by SARA at 6/7/2022 0921    Acceptance, E,D, VU,DU,NR by  at 6/3/2022 1538    Comment: PLB, safety awareness w/ AD                               User Key     Initials Effective Dates Name Provider Type Discipline    SARA 06/16/21 -  Olimpia Duggan, OT Occupational Therapist OT    TORSTEN 06/16/21 -  Matthew Sloan OT Occupational Therapist OT              OT Recommendation and Plan     Plan of Care Review  Plan of Care Reviewed With: patient  Progress: improving  Outcome Evaluation: Pt demonstrates improved performance in ADLs, activity tolerance toward more dynamic tasks both in sitting EOB and standing at sink side and fxl transfers to achieve different positions after initial motivational cues/education for change of position. Pt demonstrated supine<> sitting with CGA, spv for scooting to EOB, spv for STS at EOB and CGA for standing sink side and lateral stepping for positioning with FWW. Pt at EOB and unsupported able to complete d/d socks with spv, d/d gown with min A and wash face/hands with SBA while standing with CGA for balance at sink. Pt stood for increased ~ 5 mins w/ SPO2 >/= 90% throughout with supplemental O2, rest break after standing tolerance yet with pt reporting decreased perceived exertion/SOA. Will progress pt as able during hospitalization.     Time Calculation:    Time Calculation- OT     Row Name 06/07/22 1109             Time Calculation- OT    OT Received On 06/07/22  -JY      OT Goal Re-Cert Due Date 06/13/22  -JY              Timed Charges    27153 - OT Therapeutic Activity Minutes 23  -JY      32894 - OT Self Care/Mgmt Minutes 15  -JY              Total Minutes     Timed Charges Total Minutes 38  -JY       Total Minutes 38  -JY            User Key  (r) = Recorded By, (t) = Taken By, (c) = Cosigned By    Initials Name Provider Type    Olimpia Cook OT Occupational Therapist              Therapy Charges for Today     Code Description Service Date Service Provider Modifiers Qty    71388306926  OT THERAPEUTIC ACT EA 15 MIN 6/7/2022 Olimpia Duggan OT GO 2    [id number]  OT SELF CARE/MGMT/TRAIN EA 15 MIN 6/7/2022 Olimpia Duggan OT GO 1               Olimpia Duggan OT  6/7/2022    Electronically signed by Olimpia Duggan OT at 06/07/22 1111       Speech Language Pathology Notes (most recent note)    No notes exist for this encounter.

## 2022-06-09 NOTE — THERAPY TREATMENT NOTE
Patient Name: Blane Dietz  : 1935    MRN: [id number]                              Today's Date: 2022       Admit Date: 2022    Visit Dx:     ICD-10-CM ICD-9-CM   1. Acute on chronic congestive heart failure, unspecified heart failure type (HCC)  I50.9 428.0   2. Hypoxemia requiring supplemental oxygen  R09.02 799.02    Z99.81    3. Diabetes mellitus type 2 in obese (HCC)  E11.69 250.00    E66.9 278.00   4. Elevated blood pressure reading with diagnosis of hypertension  I10 401.9   5. Noncompliance with medications  Z91.14 V15.81   6. 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)   • PICC (peripherally inserted central catheter) flush   • Acute on chronic congestive heart failure, unspecified heart failure type (HCC)   • Elevated troponin   • Cirrhosis of liver (HCC)   • Elevated transaminase level   • Pleural effusion, bilateral   • Normocytic anemia   • History of pulmonary embolism     Past Medical History:   Diagnosis Date   • Aortic stenosis     status post ROSS procedure, remote, with 2015 echocardiography revealing normal aortic and pulmonary valve function, normal ejection fraction and mild to moderate MR   • Arthritis    • Bilateral impacted cerumen 2016   • Bronchitis    • Chronic gout of right foot 2016    Impression: 2016 - stable.;    • COVID-19 vaccine series  completed 05/12/2021    [place] 2nd dose 3/12/21.   • Depression    • Diabetes mellitus (HCC)    • Diverticulitis    • Exogenous obesity    • Gout    • Heart murmur    • History of vitamin D deficiency    • Hypercholesteremia 8/11/2016   • Hyperlipidemia    • Hypertension    • Kidney lesion    • Malignant neoplasm of prostate (HCC)    • Morbid obesity (HCC) 8/11/2016   • Pneumonia 05/12/2021   • Primary osteoarthritis involving multiple joints 6/13/2016    Impression: 03/08/2016 - stable;    • Pulmonary embolism (HCC)    • Sleep apnea 05/12/2021    C-Pap   • Uncontrolled type 2 diabetes mellitus with hyperglycemia (HCC) 6/13/2016    Impression: 03/08/2016 - seeing Endo .;    • Vertigo      Past Surgical History:   Procedure Laterality Date   • CARDIAC VALVE REPLACEMENT  05/12/2021    Ross procedure 22 years ago   • COLON SURGERY     • COLONOSCOPY     • COLOSTOMY  1999   • COLOSTOMY REVISION     • CYSTOSCOPY N/A 12/7/2021    Procedure: CYSTOSCOPY FLEXIBLE;  Surgeon: José Miguel Villafuerte Jr., MD;  Location: Replaced by [facility];  Service: Urology;  Laterality: N/A;   • EXPLORATORY LAPAROTOMY      With Tissue Removal   • LIPOMA EXCISION     • MOHS SURGERY     • NEPHROURETERECTOMY Right 12/7/2021    Procedure: RIGHT NEPHROURETERECTOMY LAPAROSCOPIC WITH DAVINCI ROBOT;  Surgeon: José Miguel Villafuerte Jr., MD;  Location: Replaced by [facility] OR;  Service: Robotics - DaVinci;  Laterality: Right;   • OTHER SURGICAL HISTORY      Porcine Valve   • PROSTATE SURGERY     • PROSTATECTOMY  2000     History of Prostatectomy Perineal Radical   • SKIN CANCER EXCISION      from head and lip   • TONSILLECTOMY        General Information     Row Name 06/09/22 1312          Physical Therapy Time and Intention    Document Type therapy note (daily note)  -MB     Mode of Treatment physical therapy  -MB     Row Name 06/09/22 1312          General Information    Patient Profile Reviewed yes  -MB     Existing Precautions/Restrictions fall;oxygen therapy device and  L/min  -MB     Barriers to Rehab medically complex  -MB     Row Name 06/09/22 1312          Cognition    Orientation Status (Cognition) oriented x 4  -MB     Row Name 06/09/22 1312          Safety Issues, Functional Mobility    Safety Issues Affecting Function (Mobility) safety precaution awareness;safety precautions follow-through/compliance;sequencing abilities  -MB     Impairments Affecting Function (Mobility) balance;endurance/activity tolerance;strength;postural/trunk control;cognition  -MB           User Key  (r) = Recorded By, (t) = Taken By, (c) = Cosigned By    Initials Name Provider Type    Krysten Corcoran, PT Physical Therapist               Mobility     Row Name 06/09/22 1312          Bed Mobility    Supine-Sit [place] (Bed Mobility) supervision  -MB     Assistive Device (Bed Mobility) head of bed elevated;bed rails  -MB     Comment, (Bed Mobility) Pt. advanced to EOB w/ supervision only for safety. Pt. denied dizziness throughout. VSS.  -MB     Row Name 06/09/22 1312          Transfers    Comment, (Transfers) VCs to push w/ UEs to stand, reach back for armrests for controlled sit.  -MB     Row Name 06/09/22 1312          Sit-Stand Transfer    Sit-Stand [place] (Transfers) contact guard;verbal cues  -MB     Assistive Device (Sit-Stand Transfers) walker, front-wheeled  -MB     Row Name 06/09/22 1312          Gait/Stairs (Locomotion)    [place] Level (Gait) contact guard;verbal cues  -MB     Assistive Device (Gait) walker, front-wheeled  -MB     Distance in Feet (Gait) 135  -MB     Deviations/Abnormal Patterns (Gait) binta decreased;gait speed decreased;stride length decreased  -MB     Bilateral Gait Deviations forward flexed posture;heel strike decreased  -MB     Comment, (Gait/Stairs) Pt. ambulated w/ step through gait pattern w/ increased forward flexion and slower pace. VCs for upright posture and safe use of RW (to keep closer to THIERRY). Pt. required 1 standing rest. SpO2 91% on  3L following ambulation.  -MB           User Key  (r) = Recorded By, (t) = Taken By, (c) = Cosigned By    Initials Name Provider Type    Krysten Corcoran, PT Physical Therapist               Obj/Interventions     Row Name 06/09/22 1522          Motor Skills    Therapeutic Exercise hip;knee;ankle  -MB     Row Name 06/09/22 1522          Hip (Therapeutic Exercise)    Hip (Therapeutic Exercise) strengthening exercise  -MB     Hip Strengthening (Therapeutic Exercise) bilateral;marching while seated;15 repititions  -MB     Row Name 06/09/22 1522          Knee (Therapeutic Exercise)    Knee (Therapeutic Exercise) strengthening exercise  -MB     Knee Strengthening (Therapeutic Exercise) bilateral;LAQ (long arc quad);15 repititions  -MB     Row Name 06/09/22 1522          Ankle (Therapeutic Exercise)    Ankle (Therapeutic Exercise) strengthening exercise  -MB     Ankle Strengthening (Therapeutic Exercise) bilateral;dorsiflexion;plantarflexion;15 repititions  -[facility] Name 06/09/22 1522          Balance    Balance Assessment sitting static balance;standing dynamic balance;standing static balance  -MB     Static Sitting Balance independent  -MB     Static Standing Balance supervision  -MB     Dynamic Standing Balance contact guard  -MB     Position/Device Used, Standing Balance supported;walker, rolling  -MB     Balance Interventions standing;sit to stand;weight shifting activity;dynamic reaching  -MB           User Key  (r) = Recorded By, (t) = Taken By, (c) = Cosigned By    Initials Name Provider Type    Krysten Corcoran, PT Physical Therapist               Goals/Plan    No documentation.                Clinical Impression     Row Name 06/09/22 1523          Pain    Pretreatment Pain Rating 0/10 - no pain  -MB     Posttreatment Pain Rating 0/10 - no pain  -[facility] Name 06/09/22 1523          Plan of Care Review    Plan of Care Reviewed With patient  -MB     Progress improving  -MB     Outcome Evaluation  Patient demonstrates increasing activity tolerance and improving independence w/ mobility. He progressed forward ambulation to 135ft w/ RW and CGA, w/ 1 standing rest. PT continues to recommend IP rehab at D/C for best functional outcome.  -MB     Row Name 06/09/22 1523          Vital Signs    Pre Systolic BP Rehab 119  -MB     Pre Treatment Diastolic BP 87  -MB     Intra Systolic BP Rehab 103  -MB     Intra Treatment Diastolic BP 91  -MB     Post Systolic BP Rehab 115  -MB     Post Treatment Diastolic BP 99  -MB     Pre SpO2 (%) 95  -MB     O2 Delivery Pre Treatment nasal cannula  -MB     Intra SpO2 (%) 92  -MB     O2 Delivery Intra Treatment nasal cannula  -MB     Post SpO2 (%) 95  -MB     O2 Delivery Post Treatment nasal cannula  -MB     Pre Patient Position Supine  -MB     Intra Patient Position Standing  -MB     Post Patient Position Sitting  -MB     Row Name 06/09/22 1523          Positioning and Restraints    Pre-Treatment Position in bed  -MB     Post Treatment Position chair  -MB     In Chair notified nsg;reclined;call light within reach;encouraged to call for assist;exit alarm on;waffle cushion;legs elevated;heels elevated  -MB           User Key  (r) = Recorded By, (t) = Taken By, (c) = Cosigned By    Initials Name Provider Type    Krysten Corcoran, PT Physical Therapist               Outcome Measures     Row Name 06/09/22 1527          How much help from another person do you currently need...    Turning from your back to your side while in flat bed without using bedrails? 4  -MB     Moving from lying on back to sitting on the side of a flat bed without bedrails? 3  -MB     Moving to and from a bed to a chair (including a wheelchair)? 3  -MB     Standing up from a chair using your arms (e.g., wheelchair, bedside chair)? 3  -MB     Climbing 3-5 steps with a railing? 3  -MB     To walk in hospital room? 3  -MB     AM-PAC 6 Clicks Score (PT) 19  -MB     Highest level of mobility 6 --> Walked 10 steps  or more  -MB           User Key  (r) = Recorded By, (t) = Taken By, (c) = Cosigned By    Initials Name Provider Type    MB Krysten Marquez, PT Physical Therapist                             Physical Therapy Education                 Title: PT OT SLP Therapies (In Progress)     Topic: Physical Therapy (In Progress)     Point: Mobility training (Done)     Learning Progress Summary           Patient Eager, E, VU,NR by  at 6/3/2022 1547    Comment: Educated pt. safety/technique with transfers, ambulation, PT POC                   Point: Home exercise program (Not Started)     Learner Progress:  Not documented in this visit.          Point: Body mechanics (Done)     Learning Progress Summary           Patient Eager, E, VU,NR by  at 6/3/2022 1547    Comment: Educated pt. safety/technique with transfers, ambulation, PT POC                   Point: Precautions (Done)     Learning Progress Summary           Patient Eager, E, VU,NR by  at 6/3/2022 1547    Comment: Educated pt. safety/technique with transfers, ambulation, PT POC                               User Key     Initials Effective Dates Name Provider Type Discipline     06/01/21 -  Sophia Pina, AVINASH Physical Therapist PT              PT Recommendation and Plan     Plan of Care Reviewed With: patient  Progress: improving  Outcome Evaluation: Patient demonstrates increasing activity tolerance and improving independence w/ mobility. He progressed forward ambulation to 135ft w/ RW and CGA, w/ 1 standing rest. PT continues to recommend IP rehab at D/C for best functional outcome.     Time Calculation:    PT Charges     Row Name 06/09/22 1528             Time Calculation    Start Time 1312  -MB      PT Received On 06/09/22  -MB      PT Goal Re-Cert Due Date 06/13/22  -MB              Time Calculation- PT    Total Timed Code Minutes- PT 36 minute(s)  -MB              Timed Charges    64381 - PT Therapeutic Exercise Minutes 16  -MB      98958 - Gait Training  Minutes  20  -MB              Total Minutes    Timed Charges Total Minutes 36  -MB       Total Minutes 36  -MB            User Key  (r) = Recorded By, (t) = Taken By, (c) = Cosigned By    Initials Name Provider Type    Krysten Corcoran, PT Physical Therapist              Therapy Charges for Today     Code Description Service Date Service Provider Modifiers Qty    [id number]  PT THER PROC EA 15 MIN 6/9/2022 Krysten Marquez, PT GP 1    [id number]  GAIT TRAINING EA 15 MIN 6/9/2022 Krysten Marquez, PT GP 1          PT G-Codes  Outcome Measure Options: AM-PAC 6 Clicks Daily Activity (OT)  AM-PAC 6 Clicks Score (PT): 19  AM-PAC 6 Clicks Score (OT): 18    Krysten Marquez, AVINAHS  6/9/2022

## 2022-06-10 ENCOUNTER — READMISSION MANAGEMENT (OUTPATIENT)
Dept: CALL CENTER | Facility: HOSPITAL | Age: 87
End: 2022-06-10

## 2022-06-10 VITALS
HEART RATE: 69 BPM | SYSTOLIC BLOOD PRESSURE: 90 MMHG | DIASTOLIC BLOOD PRESSURE: 63 MMHG | RESPIRATION RATE: 20 BRPM | TEMPERATURE: 97.4 F | BODY MASS INDEX: 30.05 KG/M2 | OXYGEN SATURATION: 90 % | WEIGHT: 186.95 LBS | HEIGHT: 66 IN

## 2022-06-10 LAB
GLUCOSE BLDC GLUCOMTR-MCNC: 102 MG/DL (ref 70–130)
GLUCOSE BLDC GLUCOMTR-MCNC: 175 MG/DL (ref 70–130)
GLUCOSE BLDC GLUCOMTR-MCNC: 264 MG/DL (ref 70–130)
QT INTERVAL: 418 MS
QTC INTERVAL: 500 MS

## 2022-06-10 PROCEDURE — 99217 PR OBSERVATION CARE DISCHARGE MANAGEMENT: CPT | Performed by: INTERNAL MEDICINE

## 2022-06-10 PROCEDURE — 94660 CPAP INITIATION&MGMT: CPT

## 2022-06-10 PROCEDURE — 94799 UNLISTED PULMONARY SVC/PX: CPT

## 2022-06-10 PROCEDURE — G0378 HOSPITAL OBSERVATION PER HR: HCPCS

## 2022-06-10 PROCEDURE — 93010 ELECTROCARDIOGRAM REPORT: CPT | Performed by: INTERNAL MEDICINE

## 2022-06-10 PROCEDURE — 96372 THER/PROPH/DIAG INJ SC/IM: CPT

## 2022-06-10 PROCEDURE — 25010000002 HEPARIN (PORCINE) PER 1000 UNITS: Performed by: INTERNAL MEDICINE

## 2022-06-10 PROCEDURE — 93005 ELECTROCARDIOGRAM TRACING: CPT | Performed by: INTERNAL MEDICINE

## 2022-06-10 PROCEDURE — 82962 GLUCOSE BLOOD TEST: CPT

## 2022-06-10 PROCEDURE — 97530 THERAPEUTIC ACTIVITIES: CPT

## 2022-06-10 PROCEDURE — 63710000001 INSULIN LISPRO (HUMAN) PER 5 UNITS: Performed by: INTERNAL MEDICINE

## 2022-06-10 RX ADMIN — INSULIN LISPRO 6 UNITS: 100 INJECTION, SOLUTION INTRAVENOUS; SUBCUTANEOUS at 13:02

## 2022-06-10 RX ADMIN — FLUTICASONE PROPIONATE 2 SPRAY: 50 SPRAY, METERED NASAL at 11:23

## 2022-06-10 RX ADMIN — SACUBITRIL AND VALSARTAN 1 TABLET: 24; 26 TABLET, FILM COATED ORAL at 08:06

## 2022-06-10 RX ADMIN — SENNOSIDES AND DOCUSATE SODIUM 1 TABLET: 50; 8.6 TABLET ORAL at 08:20

## 2022-06-10 RX ADMIN — HEPARIN SODIUM 5000 UNITS: 5000 INJECTION, SOLUTION INTRAVENOUS; SUBCUTANEOUS at 06:28

## 2022-06-10 RX ADMIN — METOPROLOL SUCCINATE 100 MG: 100 TABLET, EXTENDED RELEASE ORAL at 08:04

## 2022-06-10 RX ADMIN — ALLOPURINOL 300 MG: 300 TABLET ORAL at 08:06

## 2022-06-10 RX ADMIN — EMPAGLIFLOZIN 10 MG: 10 TABLET, FILM COATED ORAL at 08:06

## 2022-06-10 RX ADMIN — FUROSEMIDE 40 MG: 40 TABLET ORAL at 08:06

## 2022-06-10 NOTE — DISCHARGE PLACEMENT REQUEST
"Rc Dietz (86 y.o. Male)   Minh Saunders, RN Case Manager  [phone]            Date of Birth   1935    Social Security Number       Address   [address]  [facility] [address]    Home Phone   [phone]    MRN   [id number]       [employer]   [facility]    Marital Status   Single                            Admission Date   6/2/22    Admission Type   Emergency    Admitting Provider   Leatha Lyon MD    Attending Provider   Leatha Lyon MD    Department, Room/Bed   [facility] 3E, S336/1       Discharge Date       Discharge Disposition       Discharge Destination                               Attending Provider: Leatha Lyon MD    Allergies: Ampicillin, Medrol [Methylprednisolone], Myrbetriq [Mirabegron], Penicillins, Bee Venom    Isolation: None   Infection: None   Code Status: CPR   Advance Care Planning Activity    Ht: 167.6 cm (65.98\")   Wt: 84.8 kg (186 lb 15.2 oz)    Admission Cmt: None   Principal Problem: Acute on chronic respiratory failure with hypoxia (HCC) [J96.21]                 Active Insurance as of 6/2/2022     Primary Coverage     Payor Plan Insurance Group Employer/Plan Group    HUMANA MEDICARE REPLACEMENT HUMANA MEDICARE REPLACEMENT [id number]     Payor Plan Address Payor Plan Phone Number Payor Plan Fax Number Effective Dates    [address] [phone]  1/1/2018 - None Entered    [facility] [address]       Subscriber Name Subscriber Birth Date Member ID       RC DIETZ 1935 [id number]                 Emergency Contacts      (Rel.) Home Phone Work Phone Mobile Phone    Urban Dietz (Power of ) [phone] -- [phone]    JULIANNECLIFFORD (Sister) [phone] -- --            Vital Signs (last day)     Date/Time Temp Temp src Pulse Resp BP Patient Position SpO2    06/10/22 0315 98.7 (37.1) Oral 60 20 118/56 Lying 96    06/09/22 2340 -- -- 59 -- -- -- --    06/09/22 2257 -- -- 67 -- -- -- 97    06/09/22 2230 " 98.6 (37) Oral 66 20 96/80 Lying 95    06/09/22 2100 -- -- 66 -- -- -- --    06/09/22 1925 98.4 (36.9) Oral 75 20 91/81 Lying 94    06/09/22 1800 -- -- 64 -- -- -- 94    06/09/22 1700 -- -- 64 -- -- -- 95    06/09/22 1600 -- -- 68 -- -- -- 95    06/09/22 1546 -- -- 59 -- 100/86 -- 96    06/09/22 1540 98.2 (36.8) Oral 68 20 116/76 Sitting --    06/09/22 1500 -- -- 65 -- -- -- 96    06/09/22 1400 -- -- 78 -- -- -- 96    06/09/22 1328 -- -- 94 -- 103/91 -- 95    06/09/22 1300 -- -- 59 -- -- -- 96    06/09/22 1202 -- -- -- -- 104/54 Sitting --    06/09/22 1200 -- -- 65 -- -- -- 93    06/09/22 1110 97.6 (36.4) Oral 77 20 119/87 Lying 95    06/09/22 1100 -- -- 75 -- -- -- 96    06/09/22 1000 -- -- 92 -- -- -- 94    06/09/22 0950 -- -- 85 -- -- -- --    06/09/22 0900 -- -- 127 -- -- -- 91    06/09/22 0800 -- -- 66 -- -- -- 98    06/09/22 0725 96.4 (35.8) Axillary 106 20 135/85 Lying 96    06/09/22 0700 -- -- 61 -- -- -- 98    06/09/22 0600 -- -- 60 -- -- -- 98    06/09/22 0500 -- -- 62 -- -- -- 96    06/09/22 0400 -- -- 79 -- -- -- 97    06/09/22 0300 -- -- 65 -- -- -- 96    06/09/22 0200 -- -- 58 -- -- -- 98    06/09/22 0100 -- -- 67 -- -- -- 99    06/09/22 0000 98.2 (36.8) Oral 102 20 149/85 Lying 92          Current Facility-Administered Medications   Medication Dose Route Frequency Provider Last Rate Last Admin   • acetaminophen (TYLENOL) tablet 650 mg  650 mg Oral Q4H PRN Courtney Ozuna, DO        Or   • acetaminophen (TYLENOL) 160 MG/5ML solution 650 mg  650 mg Oral Q4H PRN Courtney Ozuna, DO        Or   • acetaminophen (TYLENOL) suppository 650 mg  650 mg Rectal Q4H PRN Courtney Ozuna, DO       • allopurinol (ZYLOPRIM) tablet 300 mg  300 mg Oral Daily Courtney Ozuna, DO   300 mg at 06/09/22 1202   • sennosides-docusate (PERICOLACE) 8.6-50 MG per tablet 2 tablet  2 tablet Oral BID PRN Kirstin Estevez MD        And   • polyethylene glycol (MIRALAX) packet 17 g  17 g Oral Daily PRN Kirstin Estevez MD        And   •  bisacodyl (DULCOLAX) EC tablet 5 mg  5 mg Oral Daily PRN Kirstin Estevez MD        And   • bisacodyl (DULCOLAX) suppository 10 mg  10 mg Rectal Daily PRN Kirstin Estevez MD       • dextrose (D50W) (25 g/50 mL) IV injection 25 g  25 g Intravenous Q15 Min PRN Courtney Ozuna, DO       • dextrose (GLUTOSE) oral gel 15 g  15 g Oral Q15 Min PRN Courtney Ozuna, DO       • empagliflozin (JARDIANCE) tablet 10 mg  10 mg Oral Daily Kirstin Estevez MD   10 mg at 06/09/22 0950   • fluticasone (FLONASE) 50 MCG/ACT nasal spray 2 spray  2 spray Nasal Daily Courtney Ozuna DO   2 spray at 06/07/22 0856   • furosemide (LASIX) tablet 40 mg  40 mg Oral Daily Ha Toussaint MD   40 mg at 06/09/22 0951   • glucagon (human recombinant) (GLUCAGEN DIAGNOSTIC) injection 1 mg  1 mg Intramuscular Q15 Min PRN Courtney Ozuna, DO       • heparin (porcine) 5000 UNIT/ML injection 5,000 Units  5,000 Units Subcutaneous Q8H Ada Gautam MD   5,000 Units at 06/10/22 0628   • HYDROcodone-acetaminophen (NORCO) 7.5-325 MG per tablet 1 tablet  1 tablet Oral Q6H PRN Courtney Ozuna, DO       • insulin detemir (LEVEMIR) injection 20 Units  20 Units Subcutaneous Nightly Courtney Ozuna DO   20 Units at 06/09/22 2143   • Insulin Lispro (humaLOG) injection 0-9 Units  0-9 Units Subcutaneous TID AC Courtney Ozuna DO   6 Units at 06/09/22 1706   • melatonin tablet 5 mg  5 mg Oral Nightly Ada Gautam MD       • metoprolol succinate XL (TOPROL-XL) 24 hr tablet 100 mg  100 mg Oral Daily Courtney Ozuna, DO   100 mg at 06/09/22 0950   • Pharmacy Consult - MTM   Does not apply Daily Ellen Butler, PharmD       • sacubitril-valsartan (ENTRESTO) 24-26 MG tablet 1 tablet  1 tablet Oral Q12H Case, DAISY Beltran       • sodium chloride 0.9 % flush 10 mL  10 mL Intravenous PRN Courtney Ozuna DO   10 mL at 06/08/22 2059        Physician Progress Notes (last 24 hours)      Ha Toussaint MD at 06/09/22 1123        Blane PONCE  J Luis  1935  S336/1      Asked by primary service to re evaluate patient's medications with recent hypotension (holding meds) and increased ectopy.    At time of re evaluation, RN at bedside.  /85, HR 85.  Patient is asymptomatic lying in bed.    Will DC Amlodipine.  Continue lasix and Toprol XL.  Continue Entresto with parameters to hold.  Encourage continued activity/up to chair, PO intake. Discussed importance of patient getting beta blocker.    Will continue to monitor.    Ginny Cramer PA-C    Electronically signed by Ha Toussaint MD at 22 1617     Ada Gautam MD at 22 0740              [facility] Medicine Services  PROGRESS NOTE    Patient Name: Blane Dietz  : 1935  MRN: [id number]    Date of Admission: 2022  Primary Care Physician: David Em MD    Subjective   Subjective     CC:  F/U CHF    HPI:   Pt confused overnight. ABG was unremarkable. This am, he is at baseline.  RN concerned because of tachycardia and ectopy on telemetry this am-we had held his beta blocker last two doses due to hypotension. Advised her to administer this now.     ROS:  Gen- No fevers, chills  CV- No chest pain, palpitations  Resp- No cough, dyspnea  GI- No N/V/D, abd pain        Objective   Objective     Vital Signs:   Temp:  [97.6 °F (36.4 °C)-98.2 °F (36.8 °C)] 98.2 °F (36.8 °C)  Heart Rate:  [] 62  Resp:  [16-22] 20  BP: ()/(72-85) 149/85  Flow (L/min):  [2-4] 4     Physical Exam:  Constitutional: No acute distress, awake, alert  HENT: NCAT, mucous membranes moist  Respiratory: Respiratory effort normal on nasal cannula  Cardiovascular: RRR  Musculoskeletal: Trace bilateral ankle edema  Psychiatric: Appropriate affect, cooperative  Neurologic: Speech clear and fluent, confused at times (doesn't remember that we met yesterday)  Skin: No rashes on exposed surfaces    Results Reviewed:  LAB RESULTS:      Lab 22  0804  06/03/22  1510 06/02/22  1703   WBC 9.88 11.63* 9.69   HEMOGLOBIN 12.2* 12.2* 12.9*   HEMATOCRIT 38.8 39.8 41.3   PLATELETS 191 210 188   NEUTROS ABS 6.49 8.89* 7.05*   IMMATURE GRANS (ABS) 0.21* 0.17* 0.22*   LYMPHS ABS 1.84 1.22 1.30   MONOS ABS 0.80 1.08* 0.70   EOS ABS 0.31 0.09 0.21   MCV 84.7 87.9 87.3   CRP  --   --  <0.30   PROCALCITONIN  --   --  0.11   LACTATE  --   --  1.6   LDH  --   --  304*         Lab 06/08/22  0804 06/06/22  0801 06/05/22  2351 06/05/22  0854 06/04/22  0801 06/03/22  1510   SODIUM 139 142  --  145 142 141   POTASSIUM 3.7 3.8  --  3.8 4.1 4.2   CHLORIDE 99 101  --  101 99 101   CO2 28.0 31.0*  --  35.0* 31.0* 30.0*   ANION GAP 12.0 10.0  --  9.0 12.0 10.0   BUN 47* 42*  --  36* 28* 21   CREATININE 1.45* 1.40*  --  1.41* 1.36* 1.28*   EGFR 47.5* 49.6*  --  49.1* 51.3* 55.2*   GLUCOSE 83 94  --  147* 138* 127*   CALCIUM 9.5 9.6  --  9.5 9.1 10.1   MAGNESIUM  --   --  2.1  --   --  2.0   HEMOGLOBIN A1C  --   --   --   --   --  9.10*   TSH  --   --   --   --   --  1.580         Lab 06/02/22  1703   TOTAL PROTEIN 7.3   ALBUMIN 4.20   GLOBULIN 3.1   ALT (SGPT) 48*   AST (SGOT) 48*   BILIRUBIN 1.2   ALK PHOS 227*         Lab 06/03/22  1510 06/02/22  2131 06/02/22  1703   PROBNP  --   --  5,664.0*   TROPONIN T 0.052* 0.047* 0.041*         Lab 06/03/22  1510   CHOLESTEROL 169   LDL CHOL 78   HDL CHOL 74*   TRIGLYCERIDES 97         Lab 06/02/22  1703   FERRITIN 167.30         Lab 06/09/22  0100 06/02/22  2351   PH, ARTERIAL 7.374 7.359   PCO2, ARTERIAL 50.8* 46.0*   PO2 .0* 87.7   FIO2 40 36   HCO3 ART 29.6* 25.9   BASE EXCESS ART 3.5* 0.0   CARBOXYHEMOGLOBIN 0.6 0.9     Brief Urine Lab Results  (Last result in the past 365 days)      Color   Clarity   Blood   Leuk Est   Nitrite   Protein   CREAT   Urine HCG        12/06/21 0908 Yellow   Clear   Negative   Negative   Negative   Negative                 Microbiology Results Abnormal     Procedure Component Value - Date/Time    COVID  PRE-OP / PRE-PROCEDURE SCREENING ORDER (NO ISOLATION) - Swab, Nasopharynx [[id number]]  (Normal) Collected: 06/02/22 1650    Lab Status: Final result Specimen: Swab from Nasopharynx Updated: 06/02/22 1732    Narrative:      The following orders were created for panel order COVID PRE-OP / PRE-PROCEDURE SCREENING ORDER (NO ISOLATION) - Swab, Nasopharynx.  Procedure                               Abnormality         Status                     ---------                               -----------         ------                     COVID-19 and FLU A/B PCR...[[id number]]  Normal              Final result                 Please view results for these tests on the individual orders.    COVID-19 and FLU A/B PCR - Swab, Nasopharynx [[id number]]  (Normal) Collected: 06/02/22 1650    Lab Status: Final result Specimen: Swab from Nasopharynx Updated: 06/02/22 1732     COVID19 Not Detected     Influenza A PCR Not Detected     Influenza B PCR Not Detected    Narrative:      Fact sheet for providers: https://www.fda.gov/media/063461/download    Fact sheet for patients: https://www.fda.gov/media/869322/download    Test performed by PCR.          No radiology results from the last 24 hrs    Results for orders placed during the hospital encounter of 06/02/22    Adult Transthoracic Echo Complete w/ Color, Spectral and Contrast if necessary per protocol    Interpretation Summary  · There is a left pleural effusion. There is a right pleural effusion.  · Left ventricular wall thickness is consistent with hypertrophy.  · The right ventricular cavity is mildly dilated.  · Left atrial volume is mildly increased.  · The right atrial cavity is dilated.  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  · There is a bioprosthetic aortic valve present.  · Peak velocity of the flow distal to the aortic valve is 105.1 cm/s. Aortic valve mean pressure gradient is 3 mmHg.  · Aortic valve area is 2.9 cm2.  · Moderate aortic valve regurgitation is  present.  · Moderate tricuspid valve regurgitation is present.  · Estimated right ventricular systolic pressure from tricuspid regurgitation is moderately elevated (45-55 mmHg). Calculated right ventricular systolic pressure from tricuspid regurgitation is 52 mmHg.  · Ejection fraction is low normal      I have reviewed the medications:  Scheduled Meds:allopurinol, 300 mg, Oral, Daily  amLODIPine, 5 mg, Oral, Daily  empagliflozin, 10 mg, Oral, Daily  fluticasone, 2 spray, Nasal, Daily  furosemide, 40 mg, Oral, Daily  heparin (porcine), 5,000 Units, Subcutaneous, Q8H  insulin detemir, 20 Units, Subcutaneous, Nightly  insulin lispro, 0-9 Units, Subcutaneous, TID AC  metoprolol succinate XL, 100 mg, Oral, Daily  pharmacy consult - MTM, , Does not apply, Daily  sacubitril-valsartan, 1 tablet, Oral, Q12H      Continuous Infusions:   PRN Meds:.•  acetaminophen **OR** acetaminophen **OR** acetaminophen  •  senna-docusate sodium **AND** polyethylene glycol **AND** bisacodyl **AND** bisacodyl  •  dextrose  •  dextrose  •  glucagon (human recombinant)  •  HYDROcodone-acetaminophen  •  melatonin  •  sodium chloride    Assessment & Plan   Assessment & Plan     Active Hospital Problems    Diagnosis  POA   • **Acute on chronic respiratory failure with hypoxia (HCC) [J96.21]  Unknown   • Elevated troponin [R77.8]  Unknown   • Cirrhosis of liver (HCC) [K74.60]  Unknown   • Elevated transaminase level [R74.01]  Unknown   • Pleural effusion, bilateral [J90]  Unknown   • Normocytic anemia [D64.9]  Unknown   • History of pulmonary embolism [Z86.711]  Unknown   • Acute on chronic congestive heart failure, unspecified heart failure type (HCC) [I50.9]  Yes   • 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        Brief Hospital Course to date:  Blane Dietz is a 86 y.o. 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    This patient's problems and plans were partially entered by my partner and updated as appropriate by me 06/09/22.    Acute on chronic hypoxic respiratory failure  Acute exacerbation of diastolic CHF  Bilateral pleural effusions  Aortic stenosis s/p ROSS procedure  Elevated troponin  HUSSAIN on CPAP  --Likely due to medication non-adherence  --S/P IV diuresis  --Cardiology following, transitioned to PO lasix, started entresto. Held due to hypotension last two mornings- Cardiology came back by and recommended different timing of Entresto and put in hold parameters.   --Continue home jardiance  -- Cr stable on last check    Hospital Delirium  -- seems to be sundowning likely due to prolonged hospitalization  -- will trial Melatonin tonight   -- unable to do Seroquel due to QTc prolongation     Elevated transaminase levels  Cirrhosis  Liver lesion  -Imaging concerning for cirrhosis with lesion in left lobe of liver  --Will need additional imaging with MRI or CT liver mass protocol as an outpatient  --Outpatient GI follow up-will place referral at discharge.  Discussed with patient.     History of pulmonary embolism  -Completed 1 year of Eliquis therapy.  Per Dr. Ramon's last note, patient was instructed to  discontinue Eliquis in March.    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 Ramon outpatient     Diabetes mellitus type 2  -Has not been taking insulin for over 10 days.  -Contininue Levemir 20 units nightly with moderate SSI, jardiance  -Last A1c was 8.8%    DVT prophylaxis:  Medical DVT prophylaxis orders are present.       AM-PAC 6 Clicks Score (PT): 17 (22 0811)    Disposition: I expect the patient to be discharged to rehab pending insurance precert possibly tomorrow    CODE STATUS:   Code Status and Medical Interventions:   Ordered at: 22 0004     Level Of Support Discussed With:    Patient     Code Status (Patient has no pulse and is not breathing):    CPR (Attempt to Resuscitate)     Medical Interventions (Patient has pulse or is breathing):    Full Support       Ada Gautam MD  22              Electronically signed by Ada Gautam MD at 22 1126          Physical Therapy Notes (last 24 hours)      Krysten Marquez PT at 22 1312  Version 1 of 1       Goal Outcome Evaluation:  Plan of Care Reviewed With: patient        Progress: improving  Outcome Evaluation: Patient demonstrates increasing activity tolerance and improving independence w/ mobility. He progressed forward ambulation to 135ft w/ RW and CGA, w/ 1 standing rest. PT continues to recommend IP rehab at D/C for best functional outcome.    Electronically signed by Krysten Marquez PT at 22 1527     Krysten Marquez PT at 22 1312  Version 1 of 1         Patient Name: Blane Dietz  : 1935    MRN: [id number]                              Today's Date: 2022       Admit Date: 2022    Visit Dx:     ICD-10-CM ICD-9-CM   1. Acute on chronic congestive heart failure, unspecified heart failure type (HCC)  I50.9 428.0   2. Hypoxemia  requiring supplemental oxygen  R09.02 799.02    Z99.81    3. Diabetes mellitus type 2 in obese (HCC)  E11.69 250.00    E66.9 278.00   4. Elevated blood pressure reading with diagnosis of hypertension  I10 401.9   5. Noncompliance with medications  Z91.14 V15.81   6. 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)   • PICC (peripherally inserted central catheter) flush   • Acute on chronic congestive heart failure, unspecified heart failure type (HCC)   • Elevated troponin   • Cirrhosis of liver (HCC)   • Elevated transaminase level   • Pleural effusion, bilateral   • Normocytic anemia   • History of pulmonary embolism     Past Medical History:   Diagnosis Date   • Aortic stenosis     status post ROSS procedure, remote, with December 2015 echocardiography revealing normal aortic and pulmonary valve function, normal ejection fraction and mild to moderate MR   • Arthritis    • Bilateral impacted cerumen 11/23/2016   • Bronchitis    • Chronic gout of right foot 6/13/2016    Impression: 03/08/2016 - stable.;    • COVID-19 vaccine series completed 05/12/2021    Maderna 2nd dose 3/12/21.   • Depression    • Diabetes mellitus (HCC)    • Diverticulitis    • Exogenous obesity    • Gout    • Heart murmur    • History of vitamin D deficiency    • Hypercholesteremia 8/11/2016   • Hyperlipidemia    • Hypertension    • Kidney lesion    •  Malignant neoplasm of prostate (HCC)    • Morbid obesity (HCC) 8/11/2016   • Pneumonia 05/12/2021   • Primary osteoarthritis involving multiple joints 6/13/2016    Impression: 03/08/2016 - stable;    • Pulmonary embolism (HCC)    • Sleep apnea 05/12/2021    C-Pap   • Uncontrolled type 2 diabetes mellitus with hyperglycemia (HCC) 6/13/2016    Impression: 03/08/2016 - seeing Endo .;    • Vertigo      Past Surgical History:   Procedure Laterality Date   • CARDIAC VALVE REPLACEMENT  05/12/2021    Ross procedure 22 years ago   • COLON SURGERY     • COLONOSCOPY     • COLOSTOMY  1999   • COLOSTOMY REVISION     • CYSTOSCOPY N/A 12/7/2021    Procedure: CYSTOSCOPY FLEXIBLE;  Surgeon: José Miguel Villafuerte Jr., MD;  Location:  [facility];  Service: Urology;  Laterality: N/A;   • EXPLORATORY LAPAROTOMY      With Tissue Removal   • LIPOMA EXCISION     • MOHS SURGERY     • NEPHROURETERECTOMY Right 12/7/2021    Procedure: RIGHT NEPHROURETERECTOMY LAPAROSCOPIC WITH DAVINCI ROBOT;  Surgeon: José Miguel Villafuerte Jr., MD;  Location:  [facility];  Service: Robotics - DaVinci;  Laterality: Right;   • OTHER SURGICAL HISTORY      Porcine Valve   • PROSTATE SURGERY     • PROSTATECTOMY  2000     History of Prostatectomy Perineal Radical   • SKIN CANCER EXCISION      from head and lip   • TONSILLECTOMY        General Information     Row Name 06/09/22 1312          Physical Therapy Time and Intention    Document Type therapy note (daily note)  -MB     Mode of Treatment physical therapy  -MB     Row Name 06/09/22 1312          General Information    Patient Profile Reviewed yes  -MB     Existing Precautions/Restrictions fall;oxygen therapy device and L/min  -MB     Barriers to Rehab medically complex  -MB     Row Name 06/09/22 1312          Cognition    Orientation Status (Cognition) oriented x 4  -MB     Row Name 06/09/22 1312          Safety Issues, Functional Mobility    Safety Issues Affecting Function (Mobility) safety precaution  awareness;safety precautions follow-through/compliance;sequencing abilities  -MB     Impairments Affecting Function (Mobility) balance;endurance/activity tolerance;strength;postural/trunk control;cognition  -MB           User Key  (r) = Recorded By, (t) = Taken By, (c) = Cosigned By    Initials Name Provider Type    Krysten Corcoran, PT Physical Therapist               Mobility     Row Name 06/09/22 1312          Bed Mobility    Supine-Sit Stevens (Bed Mobility) supervision  -MB     Assistive Device (Bed Mobility) head of bed elevated;bed rails  -MB     Comment, (Bed Mobility) Pt. advanced to EOB w/ supervision only for safety. Pt. denied dizziness throughout. VSS.  -MB     Row Name 06/09/22 1312          Transfers    Comment, (Transfers) VCs to push w/ UEs to stand, reach back for armrests for controlled sit.  -MB     Row Name 06/09/22 1312          Sit-Stand Transfer    Sit-Stand Stevens (Transfers) contact guard;verbal cues  -MB     Assistive Device (Sit-Stand Transfers) walker, front-wheeled  -MB     Row Name 06/09/22 1312          Gait/Stairs (Locomotion)    Stevens Level (Gait) contact guard;verbal cues  -MB     Assistive Device (Gait) walker, front-wheeled  -MB     Distance in Feet (Gait) 135  -MB     Deviations/Abnormal Patterns (Gait) binta decreased;gait speed decreased;stride length decreased  -MB     Bilateral Gait Deviations forward flexed posture;heel strike decreased  -MB     Comment, (Gait/Stairs) Pt. ambulated w/ step through gait pattern w/ increased forward flexion and slower pace. VCs for upright posture and safe use of RW (to keep closer to THIERRY). Pt. required 1 standing rest. SpO2 91% on 3L following ambulation.  -MB           User Key  (r) = Recorded By, (t) = Taken By, (c) = Cosigned By    Initials Name Provider Type    Krysten Corcoran, PT Physical Therapist               Obj/Interventions     Row Name 06/09/22 1522          Motor Skills    Therapeutic Exercise  hip;knee;ankle  -MB     Row Name 06/09/22 1522          Hip (Therapeutic Exercise)    Hip (Therapeutic Exercise) strengthening exercise  -MB     Hip Strengthening (Therapeutic Exercise) bilateral;marching while seated;15 repititions  -MB     Row Name 06/09/22 1522          Knee (Therapeutic Exercise)    Knee (Therapeutic Exercise) strengthening exercise  -MB     Knee Strengthening (Therapeutic Exercise) bilateral;LAQ (long arc quad);15 repititions  -MB     Row Name 06/09/22 1522          Ankle (Therapeutic Exercise)    Ankle (Therapeutic Exercise) strengthening exercise  -MB     Ankle Strengthening (Therapeutic Exercise) bilateral;dorsiflexion;plantarflexion;15 repititions  -MB     Row Name 06/09/22 1522          Balance    Balance Assessment sitting static balance;standing dynamic balance;standing static balance  -MB     Static Sitting Balance independent  -MB     Static Standing Balance supervision  -MB     Dynamic Standing Balance contact guard  -MB     Position/Device Used, Standing Balance supported;walker, rolling  -MB     Balance Interventions standing;sit to stand;weight shifting activity;dynamic reaching  -MB           User Key  (r) = Recorded By, (t) = Taken By, (c) = Cosigned By    Initials Name Provider Type    Krysten Corcoran, PT Physical Therapist               Goals/Plan    No documentation.                Clinical Impression     Row Name 06/09/22 1523          Pain    Pretreatment Pain Rating 0/10 - no pain  -MB     Posttreatment Pain Rating 0/10 - no pain  -[facility] Name 06/09/22 1523          Plan of Care Review    Plan of Care Reviewed With patient  -MB     Progress improving  -MB     Outcome Evaluation Patient demonstrates increasing activity tolerance and improving independence w/ mobility. He progressed forward ambulation to 135ft w/ RW and CGA, w/ 1 standing rest. PT continues to recommend IP rehab at D/C for best functional outcome.  -MB     Row Name 06/09/22 1523          Vital  Signs    Pre Systolic BP Rehab 119  -MB     Pre Treatment Diastolic BP 87  -MB     Intra Systolic BP Rehab 103  -MB     Intra Treatment Diastolic BP 91  -MB     Post Systolic BP Rehab 115  -MB     Post Treatment Diastolic BP 99  -MB     Pre SpO2 (%) 95  -MB     O2 Delivery Pre Treatment nasal cannula  -MB     Intra SpO2 (%) 92  -MB     O2 Delivery Intra Treatment nasal cannula  -MB     Post SpO2 (%) 95  -MB     O2 Delivery Post Treatment nasal cannula  -MB     Pre Patient Position Supine  -MB     Intra Patient Position Standing  -MB     Post Patient Position Sitting  -MB     Row Name 06/09/22 1523          Positioning and Restraints    Pre-Treatment Position in bed  -MB     Post Treatment Position chair  -MB     In Chair notified nsg;reclined;call light within reach;encouraged to call for assist;exit alarm on;waffle cushion;legs elevated;heels elevated  -MB           User Key  (r) = Recorded By, (t) = Taken By, (c) = Cosigned By    Initials Name Provider Type    Krysten Corcoran, PT Physical Therapist               Outcome Measures     Row Name 06/09/22 1527          How much help from another person do you currently need...    Turning from your back to your side while in flat bed without using bedrails? 4  -MB     Moving from lying on back to sitting on the side of a flat bed without bedrails? 3  -MB     Moving to and from a bed to a chair (including a wheelchair)? 3  -MB     Standing up from a chair using your arms (e.g., wheelchair, bedside chair)? 3  -MB     Climbing 3-5 steps with a railing? 3  -MB     To walk in hospital room? 3  -MB     AM-PAC 6 Clicks Score (PT) 19  -MB     Highest level of mobility 6 --> Walked 10 steps or more  -MB           User Key  (r) = Recorded By, (t) = Taken By, (c) = Cosigned By    Initials Name Provider Type    Krysten Corcoran, PT Physical Therapist                             Physical Therapy Education                 Title: PT OT SLP Therapies (In Progress)      Topic: Physical Therapy (In Progress)     Point: Mobility training (Done)     Learning Progress Summary           Patient Eager, E, VU,NR by  at 6/3/2022 1547    Comment: Educated pt. safety/technique with transfers, ambulation, PT POC                   Point: Home exercise program (Not Started)     Learner Progress:  Not documented in this visit.          Point: Body mechanics (Done)     Learning Progress Summary           Patient Eager, E, VU,NR by  at 6/3/2022 1547    Comment: Educated pt. safety/technique with transfers, ambulation, PT POC                   Point: Precautions (Done)     Learning Progress Summary           Patient Eager, E, VU,NR by  at 6/3/2022 1547    Comment: Educated pt. safety/technique with transfers, ambulation, PT POC                               User Key     Initials Effective Dates Name Provider Type Discipline     06/01/21 -  Sophia Pina, AVINASH Physical Therapist PT              PT Recommendation and Plan     Plan of Care Reviewed With: patient  Progress: improving  Outcome Evaluation: Patient demonstrates increasing activity tolerance and improving independence w/ mobility. He progressed forward ambulation to 135ft w/ RW and CGA, w/ 1 standing rest. PT continues to recommend IP rehab at D/C for best functional outcome.     Time Calculation:    PT Charges     Row Name 06/09/22 1528             Time Calculation    Start Time 1312  -MB      PT Received On 06/09/22  -MB      PT Goal Re-Cert Due Date 06/13/22  -MB              Time Calculation- PT    Total Timed Code Minutes- PT 36 minute(s)  -MB              Timed Charges    33616 - PT Therapeutic Exercise Minutes 16  -MB      14567 - Gait Training Minutes  20  -MB              Total Minutes    Timed Charges Total Minutes 36  -MB       Total Minutes 36  -MB            User Key  (r) = Recorded By, (t) = Taken By, (c) = Cosigned By    Initials Name Provider Type    Krysten Corcoran, PT Physical Therapist              Therapy  Charges for Today     Code Description Service Date Service Provider Modifiers Qty    65359170638 HC PT THER PROC EA 15 MIN 2022 Krysten Marquez, PT GP 1    58080380820 HC GAIT TRAINING EA 15 MIN 2022 Krysten Marquez, PT GP 1          PT G-Codes  Outcome Measure Options: AM-PAC 6 Clicks Daily Activity (OT)  AM-PAC 6 Clicks Score (PT): 19  AM-PAC 6 Clicks Score (OT): 18    Krysten Marquez PT  2022      Electronically signed by Krysten Marquez, PT at 22 1528          Occupational Therapy Notes (most recent note)      Olimpia Duggan, OT at 22 0921          Patient Name: Blane Dietz  : 1935    MRN: [id number]                              Today's Date: 2022       Admit Date: 2022    Visit Dx:     ICD-10-CM ICD-9-CM   1. Acute on chronic congestive heart failure, unspecified heart failure type (HCC)  I50.9 428.0   2. Hypoxemia requiring supplemental oxygen  R09.02 799.02    Z99.81    3. Diabetes mellitus type 2 in obese (HCC)  E11.69 250.00    E66.9 278.00   4. Elevated blood pressure reading with diagnosis of hypertension  I10 401.9   5. 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)   • PICC (peripherally inserted central catheter) flush   • Acute on chronic congestive heart failure, unspecified heart failure type (HCC)   • Elevated troponin   • Cirrhosis of liver (HCC)   • Elevated transaminase  level   • Pleural effusion, bilateral   • Normocytic anemia   • History of pulmonary embolism     Past Medical History:   Diagnosis Date   • Aortic stenosis     status post ROSS procedure, remote, with December 2015 echocardiography revealing normal aortic and pulmonary valve function, normal ejection fraction and mild to moderate MR   • Arthritis    • Bilateral impacted cerumen 11/23/2016   • Bronchitis    • Chronic gout of right foot 6/13/2016    Impression: 03/08/2016 - stable.;    • COVID-19 vaccine series completed 05/12/2021    [place] 2nd dose 3/12/21.   • Depression    • Diabetes mellitus (HCC)    • Diverticulitis    • Exogenous obesity    • Gout    • Heart murmur    • History of vitamin D deficiency    • Hypercholesteremia 8/11/2016   • Hyperlipidemia    • Hypertension    • Kidney lesion    • Malignant neoplasm of prostate (HCC)    • Morbid obesity (HCC) 8/11/2016   • Pneumonia 05/12/2021   • Primary osteoarthritis involving multiple joints 6/13/2016    Impression: 03/08/2016 - stable;    • Pulmonary embolism (HCC)    • Sleep apnea 05/12/2021    C-Pap   • Uncontrolled type 2 diabetes mellitus with hyperglycemia (HCC) 6/13/2016    Impression: 03/08/2016 - seeing Endo .;    • Vertigo      Past Surgical History:   Procedure Laterality Date   • CARDIAC VALVE REPLACEMENT  05/12/2021    Ross procedure 22 years ago   • COLON SURGERY     • COLONOSCOPY     • COLOSTOMY  1999   • COLOSTOMY REVISION     • CYSTOSCOPY N/A 12/7/2021    Procedure: CYSTOSCOPY FLEXIBLE;  Surgeon: José Miguel Villafuerte Jr., MD;  Location: [facility];  Service: Urology;  Laterality: N/A;   • EXPLORATORY LAPAROTOMY      With Tissue Removal   • LIPOMA EXCISION     • MOHS SURGERY     • NEPHROURETERECTOMY Right 12/7/2021    Procedure: RIGHT NEPHROURETERECTOMY LAPAROSCOPIC WITH DAVINCI ROBOT;  Surgeon: José Miguel Villafuerte Jr., MD;  Location:  [facility] OR;  Service: Robotics - DaVinci;  Laterality: Right;   • OTHER SURGICAL HISTORY      Porcine Valve  "  • PROSTATE SURGERY     • PROSTATECTOMY  2000     History of Prostatectomy Perineal Radical   • SKIN CANCER EXCISION      from head and lip   • TONSILLECTOMY        General Information     Row Name 06/07/22 1040          OT Time and Intention    Document Type therapy note (daily note)  -JY     Mode of Treatment occupational therapy;individual therapy  -JY     Row Name 06/07/22 1040          General Information    Patient Profile Reviewed yes  -JY     Existing Precautions/Restrictions fall;oxygen therapy device and L/min  -JY     Barriers to Rehab medically complex  -JY     Row Name 06/07/22 1040          Cognition    Orientation Status (Cognition) oriented x 4  -JY     Row Name 06/07/22 1040          Safety Issues, Functional Mobility    Safety Issues Affecting Function (Mobility) awareness of need for assistance;insight into deficits/self-awareness;judgment;problem-solving;safety precaution awareness;safety precautions follow-through/compliance;sequencing abilities  -JKATHLEEN     Impairments Affecting Function (Mobility) balance;endurance/activity tolerance;shortness of breath;strength;postural/trunk control;cognition  -JKATHLEEN     Cognitive Impairments, Mobility Safety/Performance insight into deficits/self-awareness;judgment;problem-solving/reasoning;safety precaution awareness;safety precaution follow-through;sequencing abilities;other (see comments)  pt with very brief, intermittent confusion referencing being \"here\" in [place] vs [place] when just prior stating anticipated d/c to [place]  -SARA     Comment, Safety Issues/Impairments (Mobility) pt alert and able to follow commands, pt req'd initial motivational cues, education on OT purpose to complete progressive mobility OOB and ADLs as pt noted, \"likely discharging home or to rehab and don't really need to do this  -SARA           User Key  (r) = Recorded By, (t) = Taken By, (c) = Cosigned By    Initials Name Provider Type    Olimpia Cook, OT Occupational " Therapist                 Mobility/ADL's     Row Name 06/07/22 1045          Bed Mobility    Bed Mobility supine-sit;sit-supine;scooting/bridging  -JY     Scooting/Bridging [place] (Bed Mobility) supervision;verbal cues  -JY     Supine-Sit [place] (Bed Mobility) contact guard;verbal cues  -JY     Sit-Supine [place] (Bed Mobility) contact guard;verbal cues  -JY     Assistive Device (Bed Mobility) head of bed elevated;bed rails  -JY     Comment, (Bed Mobility) pt demonstrated need for gross CGA for bed mobility, largely for lattermost uprighting of trunk into sitting and to elevate LEs back to bed height in return to supine; positioned in sidelying L to support hx of R nephrectomy; denied any dizziness or feeling LH upon sitting, SPO2 maintained >/= 90% with supplemental O2  -JY     Row Name 06/07/22 1045          Transfers    Transfers sit-stand transfer;stand-sit transfer  -JY     Comment, (Transfers) skilled cues for optimal hand placement for controlled ascend, descend specifically to reach back prior to sitting and pushing up from seated surfaces vs grasping/pulling at FWW; pt did not require significant physical A yet req'd said cues for safety  -JY     Sit-Stand [place] (Transfers) supervision;verbal cues  -JY     Stand-Sit [place] (Transfers) contact guard;verbal cues  -JY     Row Name 06/07/22 1045          Sit-Stand Transfer    Assistive Device (Sit-Stand Transfers) walker, front-wheeled  -JY     Row Name 06/07/22 1045          Stand-Sit Transfer    Assistive Device (Stand-Sit Transfers) walker, front-wheeled  -JY     Row Name 06/07/22 1045          Functional Mobility    Functional Mobility- Comment defer to PT for specifics regarding fxl ambulation, pt demonstrated need for CGA with FWW during lateral stepping to move self toward HOB for improved positioning once seated, v/c for optimal placement of FWW with self as steps advanced  -JY     Row Name 06/07/22 1045          Activities  of Daily Living    BADL Assessment/Intervention lower body dressing;upper body dressing;grooming  -JY     Row Name 06/07/22 1045          Lower Body Dressing Assessment/Training    [place] Level (Lower Body Dressing) doff;don;socks;supervision;verbal cues  -JY     Position (Lower Body Dressing) edge of bed sitting  -JY     Comment, (Lower Body Dressing) pt able to demo figure 4 position while seated to reach feet more proximally and d/d without physical A  -JY     Row Name 06/07/22 1045          Upper Body Dressing Assessment/Training    [place] Level (Upper Body Dressing) doff;don;pajama/robe;minimum assist (75% patient effort);verbal cues  -JY     Position (Upper Body Dressing) unsupported sitting;edge of bed sitting  -JY     Comment, (Upper Body Dressing) min A for grossly proximal and posterior management of gown and posterior tying/untying of gown  -JY     Row Name 06/07/22 1045          Grooming Assessment/Training    [place] Level (Grooming) wash face, hands;standby assist;verbal cues  -JY     Position (Grooming) supported standing;sink side  -JY     Comment, (Grooming) gross SBA for face/hand washing tasks, CGA for standing balance while pt stood sink side  -JY           User Key  (r) = Recorded By, (t) = Taken By, (c) = Cosigned By    Initials Name Provider Type    Olimpia Cook OT Occupational Therapist               Obj/Interventions     Row Name 06/07/22 1053          Motor Skills    Motor Skills functional endurance  -JY     Functional Endurance pt presented this date with standing activity tolerance of ~ 5 minutes while supported with FWW and completing functional tasks with SPO2 grossly >/= 90% while monitored, pt denied any SOA yet did seek seated rest break after standing trial  -JY     Row Name 06/07/22 1053          Balance    Balance Assessment sitting static balance;sitting dynamic balance;standing static balance;standing dynamic balance  -JY     Static Sitting Balance  independent  -JY     Dynamic Sitting Balance supervision;other (see comments)  LBD  -JY     Static Standing Balance supervision;verbal cues  -JY     Dynamic Standing Balance contact guard;verbal cues  -JY     Position/Device Used, Standing Balance supported;walker, front-wheeled  -JY     Balance Interventions sitting;standing;static;dynamic;sit to stand;supported;occupation based/functional task  -JY     Comment, Balance no overt LOB during seated or standing tasks, utilized FWW throughout standing for support, able to complete more dynamic LBD (d/d socks) while unsupported at EOB this date  -JY           User Key  (r) = Recorded By, (t) = Taken By, (c) = Cosigned By    Initials Name Provider Type    Olimpia Cook, OT Occupational Therapist               Goals/Plan    No documentation.                Clinical Impression     Row Name 06/07/22 1057          Pain Assessment    Pretreatment Pain Rating 0/10 - no pain  -JY     Posttreatment Pain Rating 0/10 - no pain  -JY     Pre/Posttreatment Pain Comment denies any pain and tolerated OT interventions  -JY     Pain Intervention(s) Repositioned;Ambulation/increased activity  -JY     Row Name 06/07/22 1057          Plan of Care Review    Plan of Care Reviewed With patient  -SARA     Progress improving  -JY     Outcome Evaluation Pt demonstrates improved performance in ADLs, activity tolerance toward more dynamic tasks both in sitting EOB and standing at sink side and fxl transfers to achieve different positions after initial motivational cues/education for change of position. Pt demonstrated supine<> sitting with CGA, spv for scooting to EOB, spv for STS at EOB and CGA for standing sink side and lateral stepping for positioning with FWW. Pt at EOB and unsupported able to complete d/d socks with spv, d/d gown with min A and wash face/hands with SBA while standing with CGA for balance at sink. Pt stood for increased ~ 5 mins w/ SPO2 >/= 90% throughout with supplemental  O2, rest break after standing tolerance yet with pt reporting decreased perceived exertion/SOA. Will progress pt as able during hospitalization.  -JY     Row Name 06/07/22 1057          Vital Signs    Pre Systolic BP Rehab --  u/a to obtain BP w/ error in reading, RN aware  -JY     Pretreatment Heart Rate (beats/min) 69  -JY     Posttreatment Heart Rate (beats/min) 74  -JY     Pre SpO2 (%) 96  -JY     O2 Delivery Pre Treatment supplemental O2  -JY     Intra SpO2 (%) 91  -JY     O2 Delivery Intra Treatment supplemental O2  -JY     Post SpO2 (%) 94  -JY     O2 Delivery Post Treatment supplemental O2  -JY     Pre Patient Position Supine  -JY     Intra Patient Position Standing  -JY     Post Patient Position Supine  -JY     Row Name 06/07/22 1057          Positioning and Restraints    Pre-Treatment Position in bed  -JY     Post Treatment Position bed  -JY     In Bed notified nsg;side lying left;call light within reach;encouraged to call for assist;exit alarm on;side rails up x2  -JY           User Key  (r) = Recorded By, (t) = Taken By, (c) = Cosigned By    Initials Name Provider Type    Olimpia Cook OT Occupational Therapist               Outcome Measures     Row Name 06/07/22 1107          How much help from another is currently needed...    Putting on and taking off regular lower body clothing? 3  -JY     Bathing (including washing, rinsing, and drying) 3  -JY     Toileting (which includes using toilet bed pan or urinal) 3  -JY     Putting on and taking off regular upper body clothing 3  -JY     Taking care of personal grooming (such as brushing teeth) 3  -JY     Eating meals 3  -JY     AM-PAC 6 Clicks Score (OT) 18  -JY     Row Name 06/07/22 1107          Functional Assessment    Outcome Measure Options AM-PAC 6 Clicks Daily Activity (OT)  -JY           User Key  (r) = Recorded By, (t) = Taken By, (c) = Cosigned By    Initials Name Provider Type    Olimpia Cook OT Occupational Therapist                 Occupational Therapy Education                 Title: PT OT SLP Therapies (In Progress)     Topic: Occupational Therapy (In Progress)     Point: ADL training (In Progress)     Description:   Instruct learner(s) on proper safety adaptation and remediation techniques during self care or transfers.   Instruct in proper use of assistive devices.              Learning Progress Summary           Patient Acceptance, E,D, NR by SARA at 6/7/2022 0921    Acceptance, E,D, VU,DU,NR by  at 6/3/2022 1538    Comment: PLB, safety awareness w/ AD                   Point: Home exercise program (Not Started)     Description:   Instruct learner(s) on appropriate technique for monitoring, assisting and/or progressing therapeutic exercises/activities.              Learner Progress:  Not documented in this visit.          Point: Precautions (In Progress)     Description:   Instruct learner(s) on prescribed precautions during self-care and functional transfers.              Learning Progress Summary           Patient Acceptance, E,D, NR by SARA at 6/7/2022 0921    Acceptance, E,D, VU,DU,NR by  at 6/3/2022 1538    Comment: PLB, safety awareness w/ AD                   Point: Body mechanics (In Progress)     Description:   Instruct learner(s) on proper positioning and spine alignment during self-care, functional mobility activities and/or exercises.              Learning Progress Summary           Patient Acceptance, E,D, NR by SARA at 6/7/2022 0921    Acceptance, E,D, VU,DU,NR by  at 6/3/2022 1538    Comment: PLB, safety awareness w/ AD                               User Key     Initials Effective Dates Name Provider Type Discipline    SARA 06/16/21 -  Olimpia Duggan OT Occupational Therapist OT     06/16/21 -  Matthew Sloan OT Occupational Therapist OT              OT Recommendation and Plan     Plan of Care Review  Plan of Care Reviewed With: patient  Progress: improving  Outcome Evaluation: Pt demonstrates improved performance  in ADLs, activity tolerance toward more dynamic tasks both in sitting EOB and standing at sink side and fxl transfers to achieve different positions after initial motivational cues/education for change of position. Pt demonstrated supine<> sitting with CGA, spv for scooting to EOB, spv for STS at EOB and CGA for standing sink side and lateral stepping for positioning with FWW. Pt at EOB and unsupported able to complete d/d socks with spv, d/d gown with min A and wash face/hands with SBA while standing with CGA for balance at sink. Pt stood for increased ~ 5 mins w/ SPO2 >/= 90% throughout with supplemental O2, rest break after standing tolerance yet with pt reporting decreased perceived exertion/SOA. Will progress pt as able during hospitalization.     Time Calculation:    Time Calculation- OT     Row Name 06/07/22 1109             Time Calculation- OT    OT Received On 06/07/22  -JY      OT Goal Re-Cert Due Date 06/13/22  -JY              Timed Charges    24308 - OT Therapeutic Activity Minutes 23  -JY      51203 - OT Self Care/Mgmt Minutes 15  -JY              Total Minutes    Timed Charges Total Minutes 38  -JY       Total Minutes 38  -JY            User Key  (r) = Recorded By, (t) = Taken By, (c) = Cosigned By    Initials Name Provider Type    Olimpia Cook OT Occupational Therapist              Therapy Charges for Today     Code Description Service Date Service Provider Modifiers Qty    32542366300  OT THERAPEUTIC ACT EA 15 MIN 6/7/2022 Olimpia Duggan OT GO 2    50071276582 HC OT SELF CARE/MGMT/TRAIN EA 15 MIN 6/7/2022 Olimpia Duggan OT GO 1               Olimpia Duggan OT  6/7/2022    Electronically signed by Olimpia Duggan OT at 06/07/22 1111

## 2022-06-10 NOTE — PLAN OF CARE
Problem: Adult Inpatient Plan of Care  Goal: Plan of Care Review  Recent Flowsheet Documentation  Taken 6/3/2022 1530 by Matthew Sloan OT  Progress: (OT eval) no change  Plan of Care Reviewed With: patient  Outcome Evaluation: OT eval completed. Pt presents w/ decreased functional endurance and mild strength/balance deficit warranting skilled IP OT services to promote return to PLOF. CGA for transfers and short in-room distance, Sid for LB dressing, Ind w/ feeding. O2 sats ranged from 88-91% on 2Lnc, cues for PLB to improve sats. Pt with plans to move to [facility] in one week, OT recommends short IP rehab to assist with transition and promote best fxl outcomes.     
  Problem: Adult Inpatient Plan of Care  Goal: Plan of Care Review  Recent Flowsheet Documentation  Taken 6/7/2022 1057 by Olimpia Duggan OT  Progress: improving  Plan of Care Reviewed With: patient  Outcome Evaluation: Pt demonstrates improved performance in ADLs, activity tolerance toward more dynamic tasks both in sitting EOB and standing at sink side and fxl transfers to achieve different positions after initial motivational cues/education for change of position. Pt demonstrated supine<> sitting with CGA, spv for scooting to EOB, spv for STS at EOB and CGA for standing sink side and lateral stepping for positioning with FWW. Pt at EOB and unsupported able to complete d/d socks with spv, d/d gown with min A and wash face/hands with SBA while standing with CGA for balance at sink. Pt stood for increased ~ 5 mins w/ SPO2 >/= 90% throughout with supplemental O2, rest break after standing tolerance yet with pt reporting decreased perceived exertion/SOA. Will progress pt as able during hospitalization.     
Goal Outcome Evaluation:      Care plans added. Patient admitted at 0145 this morning. Patient placed on BPAP per orders. Nitro gtt initiated pt is tolerating well at this time. See assessment for further. Will continue to monitor           
Goal Outcome Evaluation:      Patient has had no complaints over night. BP remained stable. Pt progressing as appropriate. Will continue to monitor.            
Goal Outcome Evaluation:  Plan of Care Reviewed With: patient           Outcome Evaluation: Pt. presents with generalized weakness, balance impairments and decreased activity tolerance affecting his ability to safely participate in functional mobility. He performed sit to stand transfer w/contact guard assist. He ambulated 40' w/front wheeled walker, contact guard assist. Gait limited by fatigue. O2 desat to 88% on 2L nasal cannula. Recommend inpatient rehab upon discharge.  
Goal Outcome Evaluation:  Plan of Care Reviewed With: patient        Progress: improving  Outcome Evaluation: Patient demonstrates increasing activity tolerance and improving independence w/ mobility. He progressed forward ambulation to 135ft w/ RW and CGA, w/ 1 standing rest. PT continues to recommend IP rehab at D/C for best functional outcome.  
Goal Outcome Evaluation:  Plan of Care Reviewed With: patient        Progress: improving  Outcome Evaluation: Pt completed 100ft functional mob tolerated standing TA well CGA at FWW, declined grooming at this time, progressing cont IPOT per POC  
Goal Outcome Evaluation:  Plan of Care Reviewed With: patient, daughter        Progress: improving  Outcome Evaluation: A & O x 4, NSR with BBB and PVCs, 2L NC, no c/o pain, order to leave IV out, plan is rehab/waiting on insurance precjosé luis Chuck 18/all skin measures in place/heels propped on pillow, Falls score 21/bed alarm active  
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  
Goal Outcome Evaluation:  Plan of Care Reviewed With: patient, daughter        Progress: improving  Outcome Evaluation: Resp stable & no SOA on 3L/nc. Multiple cardiac dysrhythmias as documented, hypotension, asymptomatic, MD aware.  Plan LTC when medically stable.  
Goal Outcome Evaluation:  Plan of Care Reviewed With: patient, daughter        Progress: improving  Outcome Evaluation: VSS, BP improved but still had to hold entresto per order parameters. 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 Plan discharge to [facility] tomorrow. Pre-transfer COVID swab sent to lab. Pt tolerated ambulation & up to chair today. Resp stable on 3L/nc.  
Goal Outcome Evaluation:  Plan of Care Reviewed With: patient, daughter        Progress: no change  Outcome Evaluation: Pt. A & O x 4. 2 L NC, wears Cpap @ night. Sinus Arrythmia w/ 1st degree AV block, frequent PVCs. No complaints of pain throughout shift. BP borderline @ start of shift, improved. Pt. educated on skin interventions/frequent turning. Heels have preventative mepilex in place, elevated on pillows. Placement for discharge to be determined. No further complaints at this time.  
WDL

## 2022-06-10 NOTE — CASE MANAGEMENT/SOCIAL WORK
Case Management Discharge Note      Final Note: Plan is home with [facility] SN/PT/OT. Daughter will transport. Home oxygen supplied by [facility]. Dimitri at [facility] notified of low oxygen level at home and will get the patient a concentrator. Peer to Peer requested by [employer] however Dr. Connolly did not feel the patient needed skilled nursing rehab and did not do the Peer to Peer. Spoke with patient and daughter and they did not want to pursue a family appeal. Patient and daughter were agreeable to home with [facility].    Provided Post Acute Provider List?: Yes  Post Acute Provider List: Nursing Home  Delivered To: Patient  Method of Delivery: In person    Selected Continued Care - Admitted Since 6/2/2022     Destination Coordination complete.    Service Provider Selected Services Address Phone Fax Patient Preferred    [facility]  Skilled Nursing [address] [place] [address] [place] [address] [phone] --          Durable Medical Equipment Coordination complete.    Service Provider Selected Services Address Phone Fax Patient Preferred    [facility] [place]  Durable Medical Equipment [address]  [place] [address] [phone] --          Dialysis/Infusion    No services have been selected for the patient.              Home Medical Care Coordination complete.    Service Provider Selected Services Address Phone Fax Patient Preferred    [facility]  Home Health Services [address] [place] [place] [address] [phone] --          Therapy    No services have been selected for the patient.              [facility]    No services have been selected for the patient.              [facility]    No services have been selected for the patient.                       Final Discharge Disposition Code: 06 - home with home health care

## 2022-06-10 NOTE — DISCHARGE SUMMARY
[facility] Medicine Services  DISCHARGE SUMMARY    Patient Name: Blane Dietz  : 1935  MRN: [id number]    Date of Admission: 2022  4:27 PM  Date of Discharge: 6/10/2022  Primary Care Physician: David Em MD    Consults     Date and Time Order Name Status Description    6/3/2022  1:10 AM Inpatient Cardiology Consult Completed           Hospital Course     Presenting Problem:   Acute on chronic congestive heart failure, unspecified heart failure type (HCC) [I50.9]    Active Hospital Problems    Diagnosis  POA   • **Acute on chronic respiratory failure with hypoxia (HCC) [J96.21]  Unknown   • Elevated troponin [R77.8]  Unknown   • Cirrhosis of liver (HCC) [K74.60]  Unknown   • Elevated transaminase level [R74.01]  Unknown   • Pleural effusion, bilateral [J90]  Unknown   • Normocytic anemia [D64.9]  Unknown   • History of pulmonary embolism [Z86.711]  Unknown   • Acute on chronic congestive heart failure, unspecified heart failure type (HCC) [I50.9]  Yes   • 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          Blane Dietz is a 86 y.o. 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     Acute on chronic hypoxic respiratory failure  Acute exacerbation of diastolic CHF  Bilateral pleural effusions  Aortic stenosis s/p ROSS procedure  Elevated troponin  HUSSAIN on CPAP  --Likely due to medication non-adherence  --S/P IV diuresis  --Cardiology followed, transitioned to PO lasix,   --Cardiology started entresto, will continue on discharge.  -- Cr stable on last check, refuses additional labs  -- Patient to follow-up with Dr. Toussaint as scheduled on 8/10/22 at 3pm     Elevated transaminase levels  Cirrhosis  Liver lesion  -Imaging concerning for cirrhosis with lesion in left lobe of liver  --Will need additional imaging with MRI or CT liver mass protocol as an outpatient  --Outpatient GI follow up-will place referral at discharge.  Discussed with patient.     History of pulmonary embolism  -Completed 1 year of Eliquis therapy.  Per Dr. Ramon's last note, patient was instructed to discontinue Eliquis in March.    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 Ramon outpatient     Diabetes mellitus type 2  -Has not been taking insulin for over 10 days.  -Contininue Levemir 20 units nightly with moderate SSI, jardiance  -Last A1c was 8.8%        Discharge Follow Up Recommendations for outpatient labs/diagnostics:  Follow up with PCP within one week. Nursing arranging appointment with Dr. Em in [place] prior to discharge.  Outpatient referral to GI  Follow up with GI/Dr. Toussaint as per his instructions    Day of Discharge     HPI:   Patient is doing well this morning.  At baseline oxygen status.  Ready to go home.  Denies any shortness of breath.    Review of Systems  General: denies fevers or chills  CV: denies chest pain  Resp: denies shortness of breath  Abd: denies abd pain, nausea      Vital Signs:   Temp:  [97.4 °F  (36.3 °C)-98.7 °F (37.1 °C)] 97.4 °F (36.3 °C)  Heart Rate:  [59-78] 69  Resp:  [20] 20  BP: ()/(56-86) 90/63  Flow (L/min):  [3] 3      Physical Exam:  Constitutional: No acute distress, awake, alert  Respiratory: Clear to auscultation bilaterally, respiratory effort normal on 2 L of oxygen  Cardiovascular: RRR, no murmurs, rubs, or gallops  Gastrointestinal: Positive bowel sounds, soft, nontender, nondistended  Musculoskeletal: No bilateral ankle edema  Psychiatric: Appropriate affect, cooperative  Neurologic: Oriented x 3, no focal neurological deficits  Skin: No rashes      Pertinent  and/or Most Recent Results     LAB RESULTS:      Lab 06/08/22  0804 06/03/22  1510   WBC 9.88 11.63*   HEMOGLOBIN 12.2* 12.2*   HEMATOCRIT 38.8 39.8   PLATELETS 191 210   NEUTROS ABS 6.49 8.89*   IMMATURE GRANS (ABS) 0.21* 0.17*   LYMPHS ABS 1.84 1.22   MONOS ABS 0.80 1.08*   EOS ABS 0.31 0.09   MCV 84.7 87.9         Lab 06/09/22  0806 06/08/22  0804 06/06/22  0801 06/05/22  2351 06/05/22  0854 06/04/22  0801 06/03/22  1510   SODIUM 145 139 142  --  145 142 141   POTASSIUM 4.2 3.7 3.8  --  3.8 4.1 4.2   CHLORIDE 105 99 101  --  101 99 101   CO2 32.0* 28.0 31.0*  --  35.0* 31.0* 30.0*   ANION GAP 8.0 12.0 10.0  --  9.0 12.0 10.0   BUN 48* 47* 42*  --  36* 28* 21   CREATININE 1.35* 1.45* 1.40*  --  1.41* 1.36* 1.28*   EGFR 51.8* 47.5* 49.6*  --  49.1* 51.3* 55.2*   GLUCOSE 98 83 94  --  147* 138* 127*   CALCIUM 9.0 9.5 9.6  --  9.5 9.1 10.1   MAGNESIUM 2.3  --   --  2.1  --   --  2.0   HEMOGLOBIN A1C  --   --   --   --   --   --  9.10*   TSH  --   --   --   --   --   --  1.580             Lab 06/03/22  1510   TROPONIN T 0.052*         Lab 06/03/22  1510   CHOLESTEROL 169   LDL CHOL 78   HDL CHOL 74*   TRIGLYCERIDES 97             Lab 06/09/22  0100   PH, ARTERIAL 7.374   PCO2, ARTERIAL 50.8*   PO2 .0*   FIO2 40   HCO3 ART 29.6*   BASE EXCESS ART 3.5*   CARBOXYHEMOGLOBIN 0.6     Brief Urine Lab Results  (Last result in  the past 365 days)      Color   Clarity   Blood   Leuk Est   Nitrite   Protein   CREAT   Urine HCG        12/06/21 0908 Yellow   Clear   Negative   Negative   Negative   Negative               Microbiology Results (last 10 days)     Procedure Component Value - Date/Time    COVID PRE-OP / PRE-PROCEDURE SCREENING ORDER (NO ISOLATION) - Swab, Nasopharynx [[id number]]  (Normal) Collected: 06/09/22 1828    Lab Status: Final result Specimen: Swab from Nasopharynx Updated: 06/09/22 1926    Narrative:      The following orders were created for panel order COVID PRE-OP / PRE-PROCEDURE SCREENING ORDER (NO ISOLATION) - Swab, Nasopharynx.  Procedure                               Abnormality         Status                     ---------                               -----------         ------                     COVID-19, ABBOTT IN-HOUS...[[id number]]  Normal              Final result                 Please view results for these tests on the individual orders.    COVID-19, ABBOTT IN-HOUSE,NASAL Swab (NO TRANSPORT MEDIA) 2 HR TAT - Swab, Nasopharynx [[id number]]  (Normal) Collected: 06/09/22 1828    Lab Status: Final result Specimen: Swab from Nasopharynx Updated: 06/09/22 1926     COVID19 Presumptive Negative    Narrative:      Fact sheet for providers: https://www.fda.gov/media/360975/download     Fact sheet for patients: https://www.fda.gov/media/220192/download    Test performed by PCR.  If inconsistent with clinical signs and symptoms patient should be tested with different authorized molecular test.    COVID PRE-OP / PRE-PROCEDURE SCREENING ORDER (NO ISOLATION) - Swab, Nasopharynx [[id number]]  (Normal) Collected: 06/02/22 1650    Lab Status: Final result Specimen: Swab from Nasopharynx Updated: 06/02/22 1732    Narrative:      The following orders were created for panel order COVID PRE-OP / PRE-PROCEDURE SCREENING ORDER (NO ISOLATION) - Swab, Nasopharynx.  Procedure                               Abnormality         Status                      ---------                               -----------         ------                     COVID-19 and FLU A/B PCR...[[id number]]  Normal              Final result                 Please view results for these tests on the individual orders.    COVID-19 and FLU A/B PCR - Swab, Nasopharynx [[id number]]  (Normal) Collected: 06/02/22 1650    Lab Status: Final result Specimen: Swab from Nasopharynx Updated: 06/02/22 1732     COVID19 Not Detected     Influenza A PCR Not Detected     Influenza B PCR Not Detected    Narrative:      Fact sheet for providers: https://www.fda.gov/media/990220/download    Fact sheet for patients: https://www.fda.gov/media/896666/download    Test performed by PCR.          Adult Transthoracic Echo Complete w/ Color, Spectral and Contrast if necessary per protocol    Result Date: 6/3/2022  · There is a left pleural effusion. There is a right pleural effusion. · Left ventricular wall thickness is consistent with hypertrophy. · The right ventricular cavity is mildly dilated. · Left atrial volume is mildly increased. · The right atrial cavity is dilated. 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 · There is a bioprosthetic aortic valve present. · Peak velocity of the flow distal to the aortic valve is 105.1 cm/s. Aortic valve mean pressure gradient is 3 mmHg. · Aortic valve area is 2.9 cm2. · Moderate aortic valve regurgitation is present. · Moderate tricuspid valve regurgitation is present. · Estimated right ventricular systolic pressure from tricuspid regurgitation is moderately elevated (45-55 mmHg). Calculated right ventricular systolic pressure from tricuspid regurgitation is 52 mmHg. · Ejection fraction is low normal      US Liver    Result Date: 6/3/2022  DATE OF EXAM: 6/3/2022 9:58 AM  PROCEDURE: US LIVER-  INDICATIONS: Diabetes mellitus, heart failure, evaluate for hepatic cirrhosis.  COMPARISON: CT of the abdomen performed on 3/29/2021.  TECHNIQUE: Grayscale and color  Doppler ultrasound evaluation of the limited abdomen was performed.  FINDINGS: There is increased hepatic echogenicity. The liver has a nodular contour. This is consistent with hepatic cirrhosis. There is a questionable hypoechoic lesion in the posterior aspect of the left lobe of the liver. It measures 1.6 x 1.6 x 1.2 cm. I would recommend further evaluation with a MRI of the abdomen with and without contrast versus a CT the abdomen with and without contrast. We are unable to perform our last the has the patient had labored breathing. There is normal directional flow in the main portal vein. The hepatic veins are patent demonstrating normal hepatofugal flow. The gallbladder is within range of normal. There are no echogenic shadowing stones. The gallbladder wall thickness is normal. The common bile duct measures 4 mm. The right kidney is now surgically absent. The pancreas was not seen well.       1. Hepatic cirrhosis. We were unable to perform a last artery feed due to labored breathing. 2. Questionable hypoechoic lesion in the posterior aspect of the left lobe of the liver measuring 1.6 x 1.6 x 1.2 cm. If the patient can tolerate a breath-hold, I would recommend an MRI of the abdomen with and without contrast. If the patient cannot tolerate a breath-hold, a CT of the abdomen with and without contrast with delayed imaging through the liver would also be of value. 3. Interval right nephrectomy. 4. The gallbladder and common bile duct are normal.  This report was finalized on 6/3/2022 2:20 PM by Brad Denis MD.      XR Chest 1 View    Result Date: 6/2/2022  XR CHEST 1 VW-  Date of Exam: 6/2/2022 4:44 PM  Indication: SOA triage protocol.  Comparison:?12/6/2021  Technique:?A single view of the chest was obtained.  FINDINGS:  ?Patient status post median sternotomy.  Cardiomegaly is stable. Pulmonary vessels are indistinct consistent with pulmonary vascular congestion.  There is hazy perihilar and bibasilar airspace  disease favored to be due to pulmonary edema.  There are new small bilateral pleural effusions.          1.  Cardiomegaly with pulmonary vascular congestion and bibasilar and perihilar airspace disease favored to be due to pulmonary edema. 2.  Small bilateral pleural effusions.   This report was finalized on 6/2/2022 5:05 PM by Long Ayala MD.      Duplex Venous Lower Extremity - Left CAR    Result Date: 6/6/2022  · Normal left lower extremity venous duplex scan.      CT Angiogram Chest    Result Date: 6/2/2022  DATE OF EXAM: 6/2/2022 6:33 PM  PROCEDURE: CT ANGIOGRAM CHEST-  INDICATIONS: SOA w/ hx PE and noncompliant with eliquis  COMPARISON: 03/28/2021  TECHNIQUE: Contiguous axial imaging was obtained from the thoracic inlet through the upper abdomen following the intravenous administration of 80 mL of Isovue 370. Reconstructed coronal and sagittal images were also obtained. Automated exposure control and iterative reconstruction methods were used.  The radiation dose reduction device was turned on for each scan per the ALARA (As Low as Reasonably Achievable) protocol.  FINDINGS: Pulmonary arteries: Adequate opacified. Some distortion of peripheral pulmonary arteries due to motion. Within that limitation, no acute emboli demonstrated. Mural calcification of the pulmonary trunk  Mediastinum: Aortic valve and coronary calcifications. No pericardial effusion. No mediastinal adenopathy. Thoracic aorta normal in caliber  Lungs/pleura: Moderate bilateral pleural effusions with secondary atelectasis in the lower lobes. Mild interlobular septal thickening and peribronchovascular interstitial thickening. Scattered foci of atelectasis in the remaining lungs  Upper abdomen: Nodular contour of the liver  Bones/soft tissues: No acute bony abnormality        1. No evidence of acute pulmonary embolism. Mural calcination's of the pulmonary trunk may indicate pulmonary arterial hypertension 2. Findings compatible with  interstitial edema and moderate bilateral pleural effusions 3. Cirrhotic morphology of the liver  This report was finalized on 6/2/2022 7:01 PM by Francisco Hummel.        Results for orders placed during the hospital encounter of 06/02/22    Duplex Venous Lower Extremity - Left CAR    Interpretation Summary  · Normal left lower extremity venous duplex scan.      Results for orders placed during the hospital encounter of 06/02/22    Duplex Venous Lower Extremity - Left CAR    Interpretation Summary  · Normal left lower extremity venous duplex scan.      Results for orders placed during the hospital encounter of 06/02/22    Adult Transthoracic Echo Complete w/ Color, Spectral and Contrast if necessary per protocol    Interpretation Summary  · There is a left pleural effusion. There is a right pleural effusion.  · Left ventricular wall thickness is consistent with hypertrophy.  · The right ventricular cavity is mildly dilated.  · Left atrial volume is mildly increased.  · The right atrial cavity is dilated.  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  · There is a bioprosthetic aortic valve present.  · Peak velocity of the flow distal to the aortic valve is 105.1 cm/s. Aortic valve mean pressure gradient is 3 mmHg.  · Aortic valve area is 2.9 cm2.  · Moderate aortic valve regurgitation is present.  · Moderate tricuspid valve regurgitation is present.  · Estimated right ventricular systolic pressure from tricuspid regurgitation is moderately elevated (45-55 mmHg). Calculated right ventricular systolic pressure from tricuspid regurgitation is 52 mmHg.  · Ejection fraction is low normal      Plan for Follow-up of Pending Labs/Results:     Discharge Details        Discharge Medications      New Medications      Instructions Start Date   furosemide 40 MG tablet  Commonly known as: LASIX   40 mg, Oral, Daily      sacubitril-valsartan 24-26 MG tablet  Commonly known as: ENTRESTO   1 tablet, Oral, Every 12 Hours  Scheduled         Changes to Medications      Instructions Start Date   allopurinol 300 MG tablet  Commonly known as: ZYLOPRIM  What changed: additional instructions   TAKE 1 TABLET BY MOUTH ONE TIME A DAY      fluticasone 50 MCG/ACT nasal spray  Commonly known as: Flonase  What changed:   · when to take this  · reasons to take this  · additional instructions   2 sprays, Nasal, Daily, 2 puffs each nostril      Lantus 100 UNIT/ML injection  Generic drug: insulin glargine  What changed:   · how much to take  · how to take this  · when to take this   Up to 50 units daily      metFORMIN  MG 24 hr tablet  Commonly known as: GLUCOPHAGE-XR  What changed: when to take this   500 mg, Oral, 2 Times Daily      pravastatin 40 MG tablet  Commonly known as: PRAVACHOL  What changed: when to take this   TAKE ONE TABLET BY MOUTH EVERY NIGHT AT BEDTIME      Trulicity 1.5 MG/0.5ML solution pen-injector  Generic drug: Dulaglutide  What changed: additional instructions   1.5 mg, Subcutaneous, Weekly, NO FURTHER REFILLS WITHOUT APPT         Continue These Medications      Instructions Start Date   Cinnamon 500 MG tablet   2,000 tablets, Oral, Daily      Coenzyme Q10 100 MG tablet   2 tablets, Oral, Daily      docusate sodium 100 MG capsule  Commonly known as: Colace   100 mg, Oral, Daily PRN      Jardiance 10 MG tablet tablet  Generic drug: empagliflozin   10 mg, Oral, Daily, Appointment needed for additional refills      l-methylfolate-algae-B6-B12 3-90.314-2-35 MG capsule capsule   TAKE 1 CAPSULE BY MOUTH TWICE A DAY.      LECITHIN PO   1,200 mg, Oral, Daily      leuprolide 22.5 MG injection  Commonly known as: LUPRON   22.5 mg, Intramuscular, Every 3 Months, Next injection due on 6/22/2022      metoprolol succinate  MG 24 hr tablet  Commonly known as: TOPROL-XL   TAKE 1 TABLET BY MOUTH EVERY DAY       ondansetron 8 MG tablet  Commonly known as: ZOFRAN   8 mg, Oral, 3 Times Daily PRN      Vitamin D3 50 MCG (2000 UT)  capsule   2,000 Units, Oral, 2 Times Daily         Stop These Medications    apixaban 5 MG tablet tablet  Commonly known as: ELIQUIS     losartan-hydrochlorothiazide 100-25 MG per tablet  Commonly known as: HYZAAR            Allergies   Allergen Reactions   • Ampicillin Anaphylaxis   • Medrol [Methylprednisolone] Unknown - High Severity     Made his blood sugar get really high, can't take this   • Myrbetriq [Mirabegron] Unknown - High Severity     High BP   • Penicillins Anaphylaxis and Shortness Of Breath   • Bee Venom Swelling         Discharge Disposition:  Home or Self Care    Diet:  Hospital:  Diet Order   Procedures   • Diet Regular; Cardiac, Consistent Carbohydrate       Activity:      Restrictions or Other Recommendations:         CODE STATUS:    Code Status and Medical Interventions:   Ordered at: 06/03/22 0004     Level Of Support Discussed With:    Patient     Code Status (Patient has no pulse and is not breathing):    CPR (Attempt to Resuscitate)     Medical Interventions (Patient has pulse or is breathing):    Full Support       Future Appointments   Date Time Provider Department Center   6/13/2022 11:45 AM David Chaudhary MD [facility]   6/22/2022  1:15 PM Shannan Ramon MD [facility]   6/22/2022  1:30 PM CHAIR 2 -  [facility]   9/14/2022  1:30 PM CHAIR 4 - [facility]   10/12/2022  2:45 PM Xavier Boston MD [facility]       Additional Instructions for the Follow-ups that You Need to Schedule     Ambulatory Referral to Home Health   As directed      Face to Face Visit Date: 6/10/2022    Follow-up provider for Plan of Care?: I will be treating the patient on an ongoing basis.  Please send me the Plan of Care for signature.    Follow-up provider: DAVID CHAUDHARY [[id number]]    Reason/Clinical Findings: Resp. failure failure    Describe mobility limitations that make leaving home difficult: Impaired mobility    Nursing/Therapeutic Services  Requested: Skilled Nursing Physical Therapy Occupational Therapy    Skilled nursing orders: Medication education Pain management O2 instruction COPD management Cardiopulmonary assessments    PT orders: Therapeutic exercise Gait Training Strengthening Home safety assessment    Weight Bearing Status: Full Weight Bearing    Occupational orders: Activities of daily living Energy conservation Strengthening Home safety assessment    Frequency: 1 Week 1         Discharge Follow-up with PCP   As directed       Currently Documented PCP:    David Em MD    PCP Phone Number:    [phone]     Follow Up Details: in one week with PCP                     Leatha Lyon MD  06/10/22      Time Spent on Discharge:  I spent  35  minutes on this discharge activity which included: face-to-face encounter with the patient, reviewing the data in the system, coordination of the care with the nursing staff as well as consultants, documentation, and entering orders.

## 2022-06-10 NOTE — THERAPY TREATMENT NOTE
Patient Name: Blane Dietz  : 1935    MRN: [id number]                              Today's Date: 6/10/2022       Admit Date: 2022    Visit Dx:     ICD-10-CM ICD-9-CM   1. Acute on chronic congestive heart failure, unspecified heart failure type (HCC)  I50.9 428.0   2. Hypoxemia requiring supplemental oxygen  R09.02 799.02    Z99.81    3. Diabetes mellitus type 2 in obese (HCC)  E11.69 250.00    E66.9 278.00   4. Elevated blood pressure reading with diagnosis of hypertension  I10 401.9   5. Noncompliance with medications  Z91.14 V15.81   6. 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)   • PICC (peripherally inserted central catheter) flush   • Acute on chronic congestive heart failure, unspecified heart failure type (HCC)   • Elevated troponin   • Cirrhosis of liver (HCC)   • Elevated transaminase level   • Pleural effusion, bilateral   • Normocytic anemia   • History of pulmonary embolism     Past Medical History:   Diagnosis Date   • Aortic stenosis     status post ROSS procedure, remote, with 2015 echocardiography revealing normal aortic and pulmonary valve function, normal ejection fraction and mild to moderate MR   • Arthritis    • Bilateral impacted cerumen 2016   • Bronchitis    • Chronic gout of right foot 2016    Impression: 2016 - stable.;    • COVID-19 vaccine series  completed 05/12/2021    [place] 2nd dose 3/12/21.   • Depression    • Diabetes mellitus (HCC)    • Diverticulitis    • Exogenous obesity    • Gout    • Heart murmur    • History of vitamin D deficiency    • Hypercholesteremia 8/11/2016   • Hyperlipidemia    • Hypertension    • Kidney lesion    • Malignant neoplasm of prostate (HCC)    • Morbid obesity (HCC) 8/11/2016   • Pneumonia 05/12/2021   • Primary osteoarthritis involving multiple joints 6/13/2016    Impression: 03/08/2016 - stable;    • Pulmonary embolism (HCC)    • Sleep apnea 05/12/2021    C-Pap   • Uncontrolled type 2 diabetes mellitus with hyperglycemia (HCC) 6/13/2016    Impression: 03/08/2016 - seeing Endo .;    • Vertigo      Past Surgical History:   Procedure Laterality Date   • CARDIAC VALVE REPLACEMENT  05/12/2021    Ross procedure 22 years ago   • COLON SURGERY     • COLONOSCOPY     • COLOSTOMY  1999   • COLOSTOMY REVISION     • CYSTOSCOPY N/A 12/7/2021    Procedure: CYSTOSCOPY FLEXIBLE;  Surgeon: José Miguel Villafuerte Jr., MD;  Location: [facility] OR;  Service: Urology;  Laterality: N/A;   • EXPLORATORY LAPAROTOMY      With Tissue Removal   • LIPOMA EXCISION     • MOHS SURGERY     • NEPHROURETERECTOMY Right 12/7/2021    Procedure: RIGHT NEPHROURETERECTOMY LAPAROSCOPIC WITH DAVINCI ROBOT;  Surgeon: José Miguel Villafuerte Jr., MD;  Location: [facility] OR;  Service: Robotics - DaVinci;  Laterality: Right;   • OTHER SURGICAL HISTORY      Porcine Valve   • PROSTATE SURGERY     • PROSTATECTOMY  2000     History of Prostatectomy Perineal Radical   • SKIN CANCER EXCISION      from head and lip   • TONSILLECTOMY        General Information     Row Name 06/10/22 0850          OT Time and Intention    Document Type therapy note (daily note)  -KF     Mode of Treatment occupational therapy  -KF     Row Name 06/10/22 0850          General Information    Patient Profile Reviewed yes  -KF     Existing Precautions/Restrictions fall;oxygen therapy device and L/min  -KF      Barriers to Rehab medically complex;previous functional deficit  -KF     Row Name 06/10/22 0850          Cognition    Orientation Status (Cognition) oriented x 4  -KF     Row Name 06/10/22 0850          Safety Issues, Functional Mobility    Safety Issues Affecting Function (Mobility) insight into deficits/self-awareness;awareness of need for assistance;safety precaution awareness  -KF     Impairments Affecting Function (Mobility) balance;endurance/activity tolerance;strength;postural/trunk control;cognition  -KF     Cognitive Impairments, Mobility Safety/Performance awareness, need for assistance;insight into deficits/self-awareness;problem-solving/reasoning;safety precaution awareness  -KF           User Key  (r) = Recorded By, (t) = Taken By, (c) = Cosigned By    Initials Name Provider Type    KF Noni Castellon, OT Occupational Therapist                 Mobility/ADL's     Row Name 06/10/22 0850          Bed Mobility    Comment, (Bed Mobility) UIC  -     Row Name 06/10/22 0850          Transfers    Transfers sit-stand transfer;stand-sit transfer  -     Comment, (Transfers) good HP min cues upon sitting  -KF     Sit-Stand [place] (Transfers) contact guard;verbal cues  -KF     Stand-Sit [place] (Transfers) contact guard;verbal cues  -     Row Name 06/10/22 0850          Sit-Stand Transfer    Assistive Device (Sit-Stand Transfers) walker, front-wheeled  -KF     Row Name 06/10/22 0850          Stand-Sit Transfer    Assistive Device (Stand-Sit Transfers) walker, front-wheeled  -KF     Row Name 06/10/22 0850          Functional Mobility    Functional Mobility- Ind. Level contact guard assist;verbal cues required  -     Functional Mobility- Device walker, front-wheeled  -KF     Functional Mobility-Distance (Feet) 100  -KF     Functional Mobility- Comment 90% on RA with this distance, cues for FWW management minimally; progressing towards SBA  -KF     Row Name 06/10/22 0850          Activities of  Daily Living    BADL Assessment/Intervention upper body dressing;lower body dressing  -KF     Row Name 06/10/22 0850          Grooming Assessment/Training    Comment, (Grooming) already dressed and declined grooming at this time  -KF           User Key  (r) = Recorded By, (t) = Taken By, (c) = Cosigned By    Initials Name Provider Type    Noni Munoz, OT Occupational Therapist               Obj/Interventions     Row Name 06/10/22 0853          Balance    Balance Assessment sitting static balance;sitting dynamic balance;standing static balance;standing dynamic balance  -KF     Static Sitting Balance independent  -KF     Dynamic Sitting Balance independent  -KF     Position, Sitting Balance unsupported;sitting in chair  -KF     Static Standing Balance standby assist  -KF     Dynamic Standing Balance contact guard  -KF     Position/Device Used, Standing Balance walker, rolling  -KF     Balance Interventions standing;weight shifting activity;UE activity with balance activity  -KF     Comment, Balance able to don own mask and tolerate slight perturbations in standing at FWW without LOB  -KF           User Key  (r) = Recorded By, (t) = Taken By, (c) = Cosigned By    Initials Name Provider Type    Noni Munoz, OT Occupational Therapist               Goals/Plan    No documentation.                Clinical Impression     Row Name 06/10/22 0807          Pain Assessment    Pretreatment Pain Rating 0/10 - no pain  -KF     Posttreatment Pain Rating 0/10 - no pain  -KF     Pre/Posttreatment Pain Comment tolerated  -KF     Pain Intervention(s) Repositioned;Ambulation/increased activity  -KF     Row Name 06/10/22 0868          Plan of Care Review    Plan of Care Reviewed With patient  -KF     Progress improving  -KF     Outcome Evaluation Pt completed 100ft functional mob tolerated standing TA well CGA at FWW, declined grooming at this time, progressing cont IPOT per POC  -KF     Row Name 06/10/22 0824           Therapy Assessment/Plan (OT)    Rehab Potential (OT) good, to achieve stated therapy goals  -KF     Criteria for Skilled Therapeutic Interventions Met (OT) meets criteria;yes;skilled treatment is necessary  -KF     Therapy Frequency (OT) daily  -KF     Row Name 06/10/22 0854          Therapy Plan Review/Discharge Plan (OT)    Anticipated Discharge Disposition (OT) inpatient rehabilitation facility;other (see comments)  planned transition to [facility]  -KF     Row Name 06/10/22 0854          Vital Signs    Pre Systolic BP Rehab --  RN cleared VSS  -KF     Pre SpO2 (%) 97  -KF     O2 Delivery Pre Treatment supplemental O2  -KF     Intra SpO2 (%) 90  -KF     O2 Delivery Intra Treatment room air  -KF     Post SpO2 (%) 97  -KF     O2 Delivery Post Treatment supplemental O2  -KF     Pre Patient Position Sitting  -KF     Intra Patient Position Standing  -KF     Post Patient Position Sitting  -KF     Rest Breaks  1  -KF     Row Name 06/10/22 0854          Positioning and Restraints    Pre-Treatment Position sitting in chair/recliner  -KF     Post Treatment Position chair  -KF     In Chair notified nsg;reclined;sitting;call light within reach;encouraged to call for assist;exit alarm on;with nsg;legs elevated;waffle cushion  -KF           User Key  (r) = Recorded By, (t) = Taken By, (c) = Cosigned By    Initials Name Provider Type    KF Noni Castellon, OT Occupational Therapist               Outcome Measures     Row Name 06/10/22 0856          How much help from another is currently needed...    Putting on and taking off regular lower body clothing? 3  -KF     Bathing (including washing, rinsing, and drying) 3  -KF     Toileting (which includes using toilet bed pan or urinal) 3  -KF     Putting on and taking off regular upper body clothing 3  -KF     Taking care of personal grooming (such as brushing teeth) 3  -KF     Eating meals 4  -KF     AM-PAC 6 Clicks Score (OT) 19  -KF     Row Name 06/10/22 0856          Functional  Assessment    Outcome Measure Options AM-PAC 6 Clicks Daily Activity (OT)  -KF           User Key  (r) = Recorded By, (t) = Taken By, (c) = Cosigned By    Initials Name Provider Type    Noni Munoz OT Occupational Therapist                Occupational Therapy Education                 Title: PT OT SLP Therapies (In Progress)     Topic: Occupational Therapy (In Progress)     Point: ADL training (Done)     Description:   Instruct learner(s) on proper safety adaptation and remediation techniques during self care or transfers.   Instruct in proper use of assistive devices.              Learning Progress Summary           Patient Acceptance, E,TB,D, VU,DU,NR by KF at 6/10/2022 0856    Acceptance, E,D, NR by SARA at 6/7/2022 0921    Acceptance, E,D, VU,DU,NR by TORSTEN at 6/3/2022 1538    Comment: PLB, safety awareness w/ AD                   Point: Home exercise program (Not Started)     Description:   Instruct learner(s) on appropriate technique for monitoring, assisting and/or progressing therapeutic exercises/activities.              Learner Progress:  Not documented in this visit.          Point: Precautions (Done)     Description:   Instruct learner(s) on prescribed precautions during self-care and functional transfers.              Learning Progress Summary           Patient Acceptance, E,TB,D, VU,DU,NR by KF at 6/10/2022 0856    Acceptance, E,D, NR by SARA at 6/7/2022 0921    Acceptance, E,D, VU,DU,NR by TORSTEN at 6/3/2022 1538    Comment: PLB, safety awareness w/ AD                   Point: Body mechanics (Done)     Description:   Instruct learner(s) on proper positioning and spine alignment during self-care, functional mobility activities and/or exercises.              Learning Progress Summary           Patient Acceptance, E,TB,D, VU,DU,NR by KF at 6/10/2022 0856    Acceptance, E,D, NR by SARA at 6/7/2022 0921    Acceptance, E,D, VU,DU,NR by TORSTEN at 6/3/2022 1538    Comment: PLB, safety awareness w/ AD                                User Key     Initials Effective Dates Name Provider Type Discipline    KF 06/16/21 -  Noni Castellon, OT Occupational Therapist OT    JKATHLEEN 06/16/21 -  Olimpia Duggan, OT Occupational Therapist OT    CS 06/16/21 -  Matthew Sloan, OT Occupational Therapist OT              OT Recommendation and Plan  Therapy Frequency (OT): daily  Plan of Care Review  Plan of Care Reviewed With: patient  Progress: improving  Outcome Evaluation: Pt completed 100ft functional mob tolerated standing TA well CGA at FWW, declined grooming at this time, progressing cont IPOT per POC     Time Calculation:    Time Calculation- OT     Row Name 06/10/22 0752             Time Calculation- OT    OT Start Time 0752  -KF      OT Received On 06/10/22  -KF              Timed Charges    12546 - OT Therapeutic Activity Minutes 13  -KF              Total Minutes    Timed Charges Total Minutes 13  -KF       Total Minutes 13  -KF            User Key  (r) = Recorded By, (t) = Taken By, (c) = Cosigned By    Initials Name Provider Type    KF Noni Castellon, OT Occupational Therapist              Therapy Charges for Today     Code Description Service Date Service Provider Modifiers Qty    72393260774  OT THERAPEUTIC ACT EA 15 MIN 6/10/2022 Noni Castellon OT GO 1               Noni Castellon OT  6/10/2022

## 2022-06-10 NOTE — CASE MANAGEMENT/SOCIAL WORK
Continued Stay Note  [facility]     Patient Name: Blane Dietz  MRN: [id number]  Today's Date: 6/10/2022    Admit Date: 6/2/2022     Discharge Plan     Row Name 06/10/22 0834       Plan    Plan [facility]    Patient/Family in Agreement with Plan yes    Plan Comments Spoke with Aissatou at [facility] and they are requesting a Peer to Peer by 12:30 today for the patient. Physician is to call [phone] Option 5 by 12:30 today. CM has notified Dr. Connolly. CM will continue to follow.    Final Discharge Disposition Code 03 - skilled nursing facility (SNF)               Discharge Codes    No documentation.               Expected Discharge Date and Time     Expected Discharge Date Expected Discharge Time    Jun 7, 2022             Jesus Saunders RN

## 2022-06-10 NOTE — DISCHARGE PLACEMENT REQUEST
"Rc Diezt (86 y.o. Male)   Minh Saunders, RN Case Manager  [phone]            Date of Birth   1935    Social Security Number       Address   [address] [place] [address]    Home Phone   [phone]    MRN   [id number]       [facility]   [facility]    Marital Status   Single                            Admission Date   6/2/22    Admission Type   Emergency    Admitting Provider   Leatha Lyon MD    Attending Provider   Leatha Lyon MD    Department, Room/Bed   [facility] 3E, S336/1       Discharge Date       Discharge Disposition       Discharge Destination                               Attending Provider: Leatha Lyon MD    Allergies: Ampicillin, Medrol [Methylprednisolone], Myrbetriq [Mirabegron], Penicillins, Bee Venom    Isolation: None   Infection: None   Code Status: CPR   Advance Care Planning Activity    Ht: 167.6 cm (65.98\")   Wt: 84.8 kg (186 lb 15.2 oz)    Admission Cmt: None   Principal Problem: Acute on chronic respiratory failure with hypoxia (HCC) [J96.21]                 Active Insurance as of 6/2/2022     Primary Coverage     Payor Plan Insurance Group Employer/Plan Group    HUMANA MEDICARE REPLACEMENT HUMANA MEDICARE REPLACEMENT [id number]     Payor Plan Address Payor Plan Phone Number Payor Plan Fax Number Effective Dates    [address] [address] [phone]  1/1/2018 - None Entered    [facility] [address]       Subscriber Name Subscriber Birth Date Member ID       RC DIETZ 1935 [id number]                 Emergency Contacts      (Rel.) Home Phone Work Phone Mobile Phone    Urban Dietz (Power of ) [phone] -- [phone]    JULIANNECLIFFORD (Sister) [phone] -- --        [facility] [address]E  [address] [place] [address]  Phone:  [phone]  Fax:  [phone] Date: Arnulfo 10, 2022      Ambulatory Referral to Home Health     Patient:  Rc Dietz MRN:  [id number]   [address]  [facility] [address] :  1935  SSN:    Phone: [phone] Sex:  M      INSURANCE PAYOR PLAN GROUP # SUBSCRIBER ID   Primary:    [employer]LINE MEDICARE REPLACEMENT [id number]      Referring Provider Information:  BARBARA TAN Phone: [phone] Fax: [phone]       Referral Information:   # Visits:  999 Referral Type: Home Health [42]   Urgency:  Routine Referral Reason: Specialty Services Required   Start Date: Arnulfo 10, 2022 End Date:  To be determined by Insurer   Diagnosis: Acute on chronic congestive heart failure, unspecified heart failure type (HCC) (I50.9 [ICD-10-CM] 428.0 [ICD-9-CM])  Noncompliance with medications (Z91.14 [ICD-10-CM] V15.81 [ICD-9-CM])  History of pulmonary embolism (Z86.711 [ICD-10-CM] V12.55 [ICD-9-CM])  Pleural effusion, bilateral (J90 [ICD-10-CM] 511.9 [ICD-9-CM])  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 I will be treating the patient on an ongoing basis.  Please send me the Plan of Care for signature.  Follow-up provider: JOSE CHAUDHARY [[id number]]  Reason/Clinical Findings: Resp. failure failure  Describe mobility limitations that make leaving home difficult: Impaired mobility  Nursing/Therapeutic Services Requested: Skilled Nursing  Nursing/Therapeutic Services Requested: Physical Therapy  Nursing/Therapeutic Services Requested: Occupational Therapy  Skilled nursing orders: Medication education  Skilled nursing orders: Pain  management  Skilled nursing orders: O2 instruction  Skilled nursing orders: COPD management  Skilled nursing orders: Cardiopulmonary assessments  PT orders: Therapeutic exercise  PT orders: Gait Training  PT orders: Strengthening  PT orders: Home safety assessment  Weight Bearing Status: Full Weight Bearing  Occupational orders: Activities of daily living  Occupational orders: Energy conservation  Occupational orders: Strengthening  Occupational orders: Home safety assessment  Frequency: 1 Week 1     This document serves as a request of services and does not constitute Insurance authorization or approval of services.  To determine eligibility, please contact the members Insurance carrier to verify and review coverage.     If you have medical questions regarding this request for services. Please contact [address] at [phone] during normal business hours.        Authorizing Provider:Leatha Lyon MD  Authorizing Provider's NPI: [id number]  Order Entered By: Jesus Saunders RN 6/10/2022 10:53 AM     Electronically signed by: Leatha Lyon MD 6/10/2022 10:53 AM           Vital Signs (last day)     Date/Time Temp Temp src Pulse Resp BP Patient Position SpO2    06/10/22 0810 -- -- -- -- -- -- 90    06/10/22 0315 98.7 (37.1) Oral 60 20 118/56 Lying 96    06/09/22 2340 -- -- 59 -- -- -- --    06/09/22 2257 -- -- 67 -- -- -- 97    06/09/22 2230 98.6 (37) Oral 66 20 96/80 Lying 95    06/09/22 2100 -- -- 66 -- -- -- --    06/09/22 1925 98.4 (36.9) Oral 75 20 91/81 Lying 94    06/09/22 1800 -- -- 64 -- -- -- 94    06/09/22 1700 -- -- 64 -- -- -- 95    06/09/22 1600 -- -- 68 -- -- -- 95    06/09/22 1546 -- -- 59 -- 100/86 -- 96    06/09/22 1540 98.2 (36.8) Oral 68 20 116/76 Sitting --    06/09/22 1500 -- -- 65 -- -- -- 96    06/09/22 1400 -- -- 78 -- -- -- 96    06/09/22 1328 -- -- 94 -- 103/91 -- 95    06/09/22 1300 -- -- 59 -- -- -- 96    06/09/22 1202 -- -- -- -- 104/54 Sitting --    06/09/22  1200 -- -- 65 -- -- -- 93    06/09/22 1110 97.6 (36.4) Oral 77 20 119/87 Lying 95    06/09/22 1100 -- -- 75 -- -- -- 96    06/09/22 1000 -- -- 92 -- -- -- 94    06/09/22 0950 -- -- 85 -- -- -- --    06/09/22 0900 -- -- 127 -- -- -- 91    06/09/22 0800 -- -- 66 -- -- -- 98    06/09/22 0725 96.4 (35.8) Axillary 106 20 135/85 Lying 96    06/09/22 0700 -- -- 61 -- -- -- 98    06/09/22 0600 -- -- 60 -- -- -- 98    06/09/22 0500 -- -- 62 -- -- -- 96    06/09/22 0400 -- -- 79 -- -- -- 97    06/09/22 0300 -- -- 65 -- -- -- 96    06/09/22 0200 -- -- 58 -- -- -- 98    06/09/22 0100 -- -- 67 -- -- -- 99    06/09/22 0000 98.2 (36.8) Oral 102 20 149/85 Lying 92          Current Facility-Administered Medications   Medication Dose Route Frequency Provider Last Rate Last Admin   • acetaminophen (TYLENOL) tablet 650 mg  650 mg Oral Q4H PRN Courtney Ozuna DO        Or   • acetaminophen (TYLENOL) 160 MG/5ML solution 650 mg  650 mg Oral Q4H PRN Courtney Ozuna, DO        Or   • acetaminophen (TYLENOL) suppository 650 mg  650 mg Rectal Q4H PRN Courtney Ozuna, DO       • allopurinol (ZYLOPRIM) tablet 300 mg  300 mg Oral Daily Courtney Ozuna DO   300 mg at 06/10/22 0806   • sennosides-docusate (PERICOLACE) 8.6-50 MG per tablet 2 tablet  2 tablet Oral BID PRN Kirstin Estevez MD   1 tablet at 06/10/22 0820    And   • polyethylene glycol (MIRALAX) packet 17 g  17 g Oral Daily PRN Kirstin Estevez MD        And   • bisacodyl (DULCOLAX) EC tablet 5 mg  5 mg Oral Daily PRN Kirstin Estevez MD        And   • bisacodyl (DULCOLAX) suppository 10 mg  10 mg Rectal Daily PRN Kirstin Estevez MD       • dextrose (D50W) (25 g/50 mL) IV injection 25 g  25 g Intravenous Q15 Min PRN Courtney Ozuna, DO       • dextrose (GLUTOSE) oral gel 15 g  15 g Oral Q15 Min PRN Courtney Ozuna, DO       • empagliflozin (JARDIANCE) tablet 10 mg  10 mg Oral Daily Kirstin Estevez MD   10 mg at 06/10/22 0806   • fluticasone (FLONASE) 50 MCG/ACT nasal spray 2 spray  2 spray  Nasal Daily Laith, Courtney G, DO   2 spray at 06/07/22 0856   • furosemide (LASIX) tablet 40 mg  40 mg Oral Daily Ha Toussaint MD   40 mg at 06/10/22 0806   • glucagon (human recombinant) (GLUCAGEN DIAGNOSTIC) injection 1 mg  1 mg Intramuscular Q15 Min PRN Laith, Courtney G, DO       • heparin (porcine) 5000 UNIT/ML injection 5,000 Units  5,000 Units Subcutaneous Q8H Ada Gautam MD   5,000 Units at 06/10/22 0628   • HYDROcodone-acetaminophen (NORCO) 7.5-325 MG per tablet 1 tablet  1 tablet Oral Q6H PRN Laith, Courtney G, DO       • insulin detemir (LEVEMIR) injection 20 Units  20 Units Subcutaneous Nightly LaithMary tylersie G, DO   20 Units at 06/09/22 2143   • Insulin Lispro (humaLOG) injection 0-9 Units  0-9 Units Subcutaneous TID AC LaithGila tylere G, DO   6 Units at 06/09/22 1706   • melatonin tablet 5 mg  5 mg Oral Nightly Ada Gautam MD       • metoprolol succinate XL (TOPROL-XL) 24 hr tablet 100 mg  100 mg Oral Daily LaithMary tylersie G, DO   100 mg at 06/10/22 0804   • Pharmacy Consult - MTM   Does not apply Daily Ellen Butler, PharmD       • sacubitril-valsartan (ENTRESTO) 24-26 MG tablet 1 tablet  1 tablet Oral Q12H Ginny Cramer PA   1 tablet at 06/10/22 0806   • sodium chloride 0.9 % flush 10 mL  10 mL Intravenous PRN LaithMary tylersie G, DO   10 mL at 06/08/22 2059        Physician Progress Notes (last 24 hours)      Ha Toussaint MD at 06/09/22 1123        Blane Dietz  1935  S336/1      Asked by primary service to re evaluate patient's medications with recent hypotension (holding meds) and increased ectopy.    At time of re evaluation, RN at bedside.  /85, HR 85.  Patient is asymptomatic lying in bed.    Will DC Amlodipine.  Continue lasix and Toprol XL.  Continue Entresto with parameters to hold.  Encourage continued activity/up to chair, PO intake. Discussed importance of patient getting beta blocker.    Will continue to monitor.    Ginny Cramer,  PA-C    Electronically signed by Ha Toussaint MD at 22 1617          Physical Therapy Notes (most recent note)      Krysten Marquez, PT at 22 1312  Version 1 of 1         Patient Name: Blane Dietz  : 1935    MRN: [id number]                              Today's Date: 2022       Admit Date: 2022    Visit Dx:     ICD-10-CM ICD-9-CM   1. Acute on chronic congestive heart failure, unspecified heart failure type (HCC)  I50.9 428.0   2. Hypoxemia requiring supplemental oxygen  R09.02 799.02    Z99.81    3. Diabetes mellitus type 2 in obese (HCC)  E11.69 250.00    E66.9 278.00   4. Elevated blood pressure reading with diagnosis of hypertension  I10 401.9   5. Noncompliance with medications  Z91.14 V15.81   6. 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)   • PICC (peripherally inserted central catheter) flush   • Acute on chronic congestive heart failure, unspecified heart failure type (HCC)   • Elevated troponin   • Cirrhosis of liver (HCC)   • Elevated transaminase level   • Pleural effusion, bilateral   • Normocytic anemia   • History of pulmonary embolism     Past Medical History:   Diagnosis Date   • Aortic stenosis     status post ROSS procedure, remote, with 2015 echocardiography revealing normal aortic and pulmonary valve function, normal ejection fraction and mild to  moderate MR   • Arthritis    • Bilateral impacted cerumen 11/23/2016   • Bronchitis    • Chronic gout of right foot 6/13/2016    Impression: 03/08/2016 - stable.;    • COVID-19 vaccine series completed 05/12/2021    Maderna 2nd dose 3/12/21.   • Depression    • Diabetes mellitus (HCC)    • Diverticulitis    • Exogenous obesity    • Gout    • Heart murmur    • History of vitamin D deficiency    • Hypercholesteremia 8/11/2016   • Hyperlipidemia    • Hypertension    • Kidney lesion    • Malignant neoplasm of prostate (HCC)    • Morbid obesity (HCC) 8/11/2016   • Pneumonia 05/12/2021   • Primary osteoarthritis involving multiple joints 6/13/2016    Impression: 03/08/2016 - stable;    • Pulmonary embolism (HCC)    • Sleep apnea 05/12/2021    C-Pap   • Uncontrolled type 2 diabetes mellitus with hyperglycemia (HCC) 6/13/2016    Impression: 03/08/2016 - seeing Endo .;    • Vertigo      Past Surgical History:   Procedure Laterality Date   • CARDIAC VALVE REPLACEMENT  05/12/2021    Ross procedure 22 years ago   • COLON SURGERY     • COLONOSCOPY     • COLOSTOMY  1999   • COLOSTOMY REVISION     • CYSTOSCOPY N/A 12/7/2021    Procedure: CYSTOSCOPY FLEXIBLE;  Surgeon: José Miguel Villafuerte Jr., MD;  Location:  [facility];  Service: Urology;  Laterality: N/A;   • EXPLORATORY LAPAROTOMY      With Tissue Removal   • LIPOMA EXCISION     • MOHS SURGERY     • NEPHROURETERECTOMY Right 12/7/2021    Procedure: RIGHT NEPHROURETERECTOMY LAPAROSCOPIC WITH DAVINCI ROBOT;  Surgeon: José Miguel Villafuerte Jr., MD;  Location:  [facility];  Service: Robotics - DaVinci;  Laterality: Right;   • OTHER SURGICAL HISTORY      Porcine Valve   • PROSTATE SURGERY     • PROSTATECTOMY  2000     History of Prostatectomy Perineal Radical   • SKIN CANCER EXCISION      from head and lip   • TONSILLECTOMY        General Information     Row Name 06/09/22 1312          Physical Therapy Time and Intention    Document Type therapy note (daily note)  -MB     Mode of  Treatment physical therapy  -MB     Row Name 06/09/22 1312          General Information    Patient Profile Reviewed yes  -MB     Existing Precautions/Restrictions fall;oxygen therapy device and L/min  -MB     Barriers to Rehab medically complex  -MB     Row Name 06/09/22 1312          Cognition    Orientation Status (Cognition) oriented x 4  -MB     Row Name 06/09/22 1312          Safety Issues, Functional Mobility    Safety Issues Affecting Function (Mobility) safety precaution awareness;safety precautions follow-through/compliance;sequencing abilities  -MB     Impairments Affecting Function (Mobility) balance;endurance/activity tolerance;strength;postural/trunk control;cognition  -MB           User Key  (r) = Recorded By, (t) = Taken By, (c) = Cosigned By    Initials Name Provider Type    Krysten Corcoran, PT Physical Therapist               Mobility     Row Name 06/09/22 1312          Bed Mobility    Supine-Sit [place] (Bed Mobility) supervision  -MB     Assistive Device (Bed Mobility) head of bed elevated;bed rails  -MB     Comment, (Bed Mobility) Pt. advanced to EOB w/ supervision only for safety. Pt. denied dizziness throughout. VSS.  -MB     Row Name 06/09/22 1312          Transfers    Comment, (Transfers) VCs to push w/ UEs to stand, reach back for armrests for controlled sit.  -MB     Row Name 06/09/22 1312          Sit-Stand Transfer    Sit-Stand [place] (Transfers) contact guard;verbal cues  -MB     Assistive Device (Sit-Stand Transfers) walker, front-wheeled  -MB     Row Name 06/09/22 1312          Gait/Stairs (Locomotion)    [place] Level (Gait) contact guard;verbal cues  -MB     Assistive Device (Gait) walker, front-wheeled  -MB     Distance in Feet (Gait) 135  -MB     Deviations/Abnormal Patterns (Gait) binta decreased;gait speed decreased;stride length decreased  -MB     Bilateral Gait Deviations forward flexed posture;heel strike decreased  -MB     Comment, (Gait/Stairs) Pt.  ambulated w/ step through gait pattern w/ increased forward flexion and slower pace. VCs for upright posture and safe use of RW (to keep closer to THIERRY). Pt. required 1 standing rest. SpO2 91% on 3L following ambulation.  -MB           User Key  (r) = Recorded By, (t) = Taken By, (c) = Cosigned By    Initials Name Provider Type    Krysten Corcoran, PT Physical Therapist               Obj/Interventions     Row Name 06/09/22 1522          Motor Skills    Therapeutic Exercise hip;knee;ankle  -MB     Row Name 06/09/22 1522          Hip (Therapeutic Exercise)    Hip (Therapeutic Exercise) strengthening exercise  -MB     Hip Strengthening (Therapeutic Exercise) bilateral;marching while seated;15 repititions  -MB     Row Name 06/09/22 1522          Knee (Therapeutic Exercise)    Knee (Therapeutic Exercise) strengthening exercise  -MB     Knee Strengthening (Therapeutic Exercise) bilateral;LAQ (long arc quad);15 repititions  -MB     Row Name 06/09/22 1522          Ankle (Therapeutic Exercise)    Ankle (Therapeutic Exercise) strengthening exercise  -MB     Ankle Strengthening (Therapeutic Exercise) bilateral;dorsiflexion;plantarflexion;15 repititions  -MB     Row Name 06/09/22 1522          Balance    Balance Assessment sitting static balance;standing dynamic balance;standing static balance  -MB     Static Sitting Balance independent  -MB     Static Standing Balance supervision  -MB     Dynamic Standing Balance contact guard  -MB     Position/Device Used, Standing Balance supported;walker, rolling  -MB     Balance Interventions standing;sit to stand;weight shifting activity;dynamic reaching  -MB           User Key  (r) = Recorded By, (t) = Taken By, (c) = Cosigned By    Initials Name Provider Type    Krysten Corcoran, PT Physical Therapist               Goals/Plan    No documentation.                Clinical Impression     Row Name 06/09/22 1523          Pain    Pretreatment Pain Rating 0/10 - no pain  -MB      Posttreatment Pain Rating 0/10 - no pain  -MB     Row Name 06/09/22 1523          Plan of Care Review    Plan of Care Reviewed With patient  -MB     Progress improving  -MB     Outcome Evaluation Patient demonstrates increasing activity tolerance and improving independence w/ mobility. He progressed forward ambulation to 135ft w/ RW and CGA, w/ 1 standing rest. PT continues to recommend IP rehab at D/C for best functional outcome.  -MB     Row Name 06/09/22 1523          Vital Signs    Pre Systolic BP Rehab 119  -MB     Pre Treatment Diastolic BP 87  -MB     Intra Systolic BP Rehab 103  -MB     Intra Treatment Diastolic BP 91  -MB     Post Systolic BP Rehab 115  -MB     Post Treatment Diastolic BP 99  -MB     Pre SpO2 (%) 95  -MB     O2 Delivery Pre Treatment nasal cannula  -MB     Intra SpO2 (%) 92  -MB     O2 Delivery Intra Treatment nasal cannula  -MB     Post SpO2 (%) 95  -MB     O2 Delivery Post Treatment nasal cannula  -MB     Pre Patient Position Supine  -MB     Intra Patient Position Standing  -MB     Post Patient Position Sitting  -MB     Row Name 06/09/22 1523          Positioning and Restraints    Pre-Treatment Position in bed  -MB     Post Treatment Position chair  -MB     In Chair notified nsg;reclined;call light within reach;encouraged to call for assist;exit alarm on;waffle cushion;legs elevated;heels elevated  -MB           User Key  (r) = Recorded By, (t) = Taken By, (c) = Cosigned By    Initials Name Provider Type    Krysten Corcoran, PT Physical Therapist               Outcome Measures     Row Name 06/09/22 1527          How much help from another person do you currently need...    Turning from your back to your side while in flat bed without using bedrails? 4  -MB     Moving from lying on back to sitting on the side of a flat bed without bedrails? 3  -MB     Moving to and from a bed to a chair (including a wheelchair)? 3  -MB     Standing up from a chair using your arms (e.g., wheelchair,  bedside chair)? 3  -MB     Climbing 3-5 steps with a railing? 3  -MB     To walk in hospital room? 3  -MB     AM-PAC 6 Clicks Score (PT) 19  -MB     Highest level of mobility 6 --> Walked 10 steps or more  -MB           User Key  (r) = Recorded By, (t) = Taken By, (c) = Cosigned By    Initials Name Provider Type    Krysten Corcoran, PT Physical Therapist                             Physical Therapy Education                 Title: PT OT SLP Therapies (In Progress)     Topic: Physical Therapy (In Progress)     Point: Mobility training (Done)     Learning Progress Summary           Patient Eager, E, VU,NR by  at 6/3/2022 1547    Comment: Educated pt. safety/technique with transfers, ambulation, PT POC                   Point: Home exercise program (Not Started)     Learner Progress:  Not documented in this visit.          Point: Body mechanics (Done)     Learning Progress Summary           Patient Eager, E, VU,NR by  at 6/3/2022 1547    Comment: Educated pt. safety/technique with transfers, ambulation, PT POC                   Point: Precautions (Done)     Learning Progress Summary           Patient Eager, E, VU,NR by  at 6/3/2022 1547    Comment: Educated pt. safety/technique with transfers, ambulation, PT POC                               User Key     Initials Effective Dates Name Provider Type Discipline     06/01/21 -  Sophia Pina, AVINASH Physical Therapist PT              PT Recommendation and Plan     Plan of Care Reviewed With: patient  Progress: improving  Outcome Evaluation: Patient demonstrates increasing activity tolerance and improving independence w/ mobility. He progressed forward ambulation to 135ft w/ RW and CGA, w/ 1 standing rest. PT continues to recommend IP rehab at D/C for best functional outcome.     Time Calculation:    PT Charges     Row Name 06/09/22 1528             Time Calculation    Start Time 1312  -MB      PT Received On 06/09/22  -MB      PT Goal Re-Cert Due Date 06/13/22   -MB              Time Calculation- PT    Total Timed Code Minutes- PT 36 minute(s)  -MB              Timed Charges    49500 - PT Therapeutic Exercise Minutes 16  -MB      80726 - Gait Training Minutes  20  -MB              Total Minutes    Timed Charges Total Minutes 36  -MB       Total Minutes 36  -MB            User Key  (r) = Recorded By, (t) = Taken By, (c) = Cosigned By    Initials Name Provider Type    Krysten Corcoran, PT Physical Therapist              Therapy Charges for Today     Code Description Service Date Service Provider Modifiers Qty    02416723770 HC PT THER PROC EA 15 MIN 2022 Krysten Marquez, PT GP 1    [id number] HC GAIT TRAINING EA 15 MIN 2022 Krysten Marquez, PT GP 1          PT G-Codes  Outcome Measure Options: AM-PAC 6 Clicks Daily Activity (OT)  AM-PAC 6 Clicks Score (PT): 19  AM-PAC 6 Clicks Score (OT): 18    Krysten Marquez PT  2022      Electronically signed by Krysten Marquez PT at 22 1528          Occupational Therapy Notes (most recent note)      Noni Castellon, OT at 06/10/22 0752          Patient Name: Blane Dietz  : 1935    MRN: [id number]                              Today's Date: 6/10/2022       Admit Date: 2022    Visit Dx:     ICD-10-CM ICD-9-CM   1. Acute on chronic congestive heart failure, unspecified heart failure type ([facility])  I50.9 428.0   2. Hypoxemia requiring supplemental oxygen  R09.02 799.02    Z99.81    3. Diabetes mellitus type 2 in obese ([facility])  E11.69 250.00    E66.9 278.00   4. Elevated blood pressure reading with diagnosis of hypertension  I10 401.9   5. Noncompliance with medications  Z91.14 V15.81   6. Uncontrolled type 2 diabetes mellitus with hyperglycemia ([facility])  E11.65 250.02     Patient Active Problem List   Diagnosis   • Essential hypertension   • Uncontrolled type 2 diabetes mellitus with hyperglycemia ([facility])   • Prostate cancer ([facility])   • Aortic stenosis   • Sleep apnea   • S/P AVR   • Pulmonary  embolism (HCC)   • HLD (hyperlipidemia)   • Leukocytosis   • Elevated LFTs   • Right kidney mass   • Acute on chronic respiratory failure with hypoxia (HCC)   • Pneumonia   • s/p right nepheroureterectomy and adrenalectomy    • Acute postoperative pain   • Renal insufficiency   • 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)   • PICC (peripherally inserted central catheter) flush   • Acute on chronic congestive heart failure, unspecified heart failure type (HCC)   • Elevated troponin   • Cirrhosis of liver (HCC)   • Elevated transaminase level   • Pleural effusion, bilateral   • Normocytic anemia   • History of pulmonary embolism     Past Medical History:   Diagnosis Date   • Aortic stenosis     status post ROSS procedure, remote, with December 2015 echocardiography revealing normal aortic and pulmonary valve function, normal ejection fraction and mild to moderate MR   • Arthritis    • Bilateral impacted cerumen 11/23/2016   • Bronchitis    • Chronic gout of right foot 6/13/2016    Impression: 03/08/2016 - stable.;    • COVID-19 vaccine series completed 05/12/2021    [place] 2nd dose 3/12/21.   • Depression    • Diabetes mellitus (HCC)    • Diverticulitis    • Exogenous obesity    • Gout    • Heart murmur    • History of vitamin D deficiency    • Hypercholesteremia 8/11/2016   • Hyperlipidemia    • Hypertension    • Kidney lesion    • Malignant neoplasm of prostate (HCC)    • Morbid obesity (HCC) 8/11/2016   • Pneumonia 05/12/2021   • Primary osteoarthritis involving multiple joints 6/13/2016    Impression: 03/08/2016 - stable;    • Pulmonary embolism (HCC)    • Sleep apnea 05/12/2021    C-Pap   • Uncontrolled type 2 diabetes mellitus with hyperglycemia (HCC) 6/13/2016    Impression: 03/08/2016 - seeing Endo .;    • Vertigo      Past Surgical History:   Procedure Laterality Date   • CARDIAC VALVE REPLACEMENT  05/12/2021    Ross procedure 22 years ago   • COLON SURGERY     • COLONOSCOPY     •  COLOSTOMY  1999   • COLOSTOMY REVISION     • CYSTOSCOPY N/A 12/7/2021    Procedure: CYSTOSCOPY FLEXIBLE;  Surgeon: José Miguel Villafuerte Jr., MD;  Location: [facility] OR;  Service: Urology;  Laterality: N/A;   • EXPLORATORY LAPAROTOMY      With Tissue Removal   • LIPOMA EXCISION     • MOHS SURGERY     • NEPHROURETERECTOMY Right 12/7/2021    Procedure: RIGHT NEPHROURETERECTOMY LAPAROSCOPIC WITH DAVINCI ROBOT;  Surgeon: José Miguel Villafuerte Jr., MD;  Location:  [facility] OR;  Service: Robotics - DaVinci;  Laterality: Right;   • OTHER SURGICAL HISTORY      Porcine Valve   • PROSTATE SURGERY     • PROSTATECTOMY  2000     History of Prostatectomy Perineal Radical   • SKIN CANCER EXCISION      from head and lip   • TONSILLECTOMY        General Information     Row Name 06/10/22 0850          OT Time and Intention    Document Type therapy note (daily note)  -KF     Mode of Treatment occupational therapy  -KF     Row Name 06/10/22 0850          General Information    Patient Profile Reviewed yes  -KF     Existing Precautions/Restrictions fall;oxygen therapy device and L/min  -KF     Barriers to Rehab medically complex;previous functional deficit  -KF     Row Name 06/10/22 0850          Cognition    Orientation Status (Cognition) oriented x 4  -KF     Row Name 06/10/22 0850          Safety Issues, Functional Mobility    Safety Issues Affecting Function (Mobility) insight into deficits/self-awareness;awareness of need for assistance;safety precaution awareness  -KF     Impairments Affecting Function (Mobility) balance;endurance/activity tolerance;strength;postural/trunk control;cognition  -KF     Cognitive Impairments, Mobility Safety/Performance awareness, need for assistance;insight into deficits/self-awareness;problem-solving/reasoning;safety precaution awareness  -KF           User Key  (r) = Recorded By, (t) = Taken By, (c) = Cosigned By    Initials Name Provider Type    KF Noni Castellon, OT Occupational Therapist                  Mobility/ADL's     Row Name 06/10/22 0850          Bed Mobility    Comment, (Bed Mobility) UIC  -KF     Row Name 06/10/22 0850          Transfers    Transfers sit-stand transfer;stand-sit transfer  -     Comment, (Transfers) good HP min cues upon sitting  -KF     Sit-Stand [place] (Transfers) contact guard;verbal cues  -KF     Stand-Sit [place] (Transfers) contact guard;verbal cues  -KF     Row Name 06/10/22 0850          Sit-Stand Transfer    Assistive Device (Sit-Stand Transfers) walker, front-wheeled  -KF     Row Name 06/10/22 0850          Stand-Sit Transfer    Assistive Device (Stand-Sit Transfers) walker, front-wheeled  -KF     Row Name 06/10/22 0850          Functional Mobility    Functional Mobility- Ind. Level contact guard assist;verbal cues required  -     Functional Mobility- Device walker, front-wheeled  -KF     Functional Mobility-Distance (Feet) 100  -KF     Functional Mobility- Comment 90% on RA with this distance, cues for FWW management minimally; progressing towards SBA  -     Row Name 06/10/22 0850          Activities of Daily Living    BADL Assessment/Intervention upper body dressing;lower body dressing  -     Row Name 06/10/22 0850          Grooming Assessment/Training    Comment, (Grooming) already dressed and declined grooming at this time  -           User Key  (r) = Recorded By, (t) = Taken By, (c) = Cosigned By    Initials Name Provider Type    KF Noni Castellon, OT Occupational Therapist               Obj/Interventions     Row Name 06/10/22 0853          Balance    Balance Assessment sitting static balance;sitting dynamic balance;standing static balance;standing dynamic balance  -KF     Static Sitting Balance independent  -KF     Dynamic Sitting Balance independent  -KF     Position, Sitting Balance unsupported;sitting in chair  -KF     Static Standing Balance standby assist  -KF     Dynamic Standing Balance contact guard  -KF     Position/Device Used,  Standing Balance walker, rolling  -KF     Balance Interventions standing;weight shifting activity;UE activity with balance activity  -KF     Comment, Balance able to don own mask and tolerate slight perturbations in standing at FWW without LOB  -KF           User Key  (r) = Recorded By, (t) = Taken By, (c) = Cosigned By    Initials Name Provider Type    KF Noni Castellon, OT Occupational Therapist               Goals/Plan    No documentation.                Clinical Impression     Row Name 06/10/22 0854          Pain Assessment    Pretreatment Pain Rating 0/10 - no pain  -KF     Posttreatment Pain Rating 0/10 - no pain  -KF     Pre/Posttreatment Pain Comment tolerated  -KF     Pain Intervention(s) Repositioned;Ambulation/increased activity  -KF     Row Name 06/10/22 0854          Plan of Care Review    Plan of Care Reviewed With patient  -KF     Progress improving  -KF     Outcome Evaluation Pt completed 100ft functional mob tolerated standing TA well CGA at FWW, declined grooming at this time, progressing cont IPOT per POC  -KF     Row Name 06/10/22 0885          Therapy Assessment/Plan (OT)    Rehab Potential (OT) good, to achieve stated therapy goals  -KF     Criteria for Skilled Therapeutic Interventions Met (OT) meets criteria;yes;skilled treatment is necessary  -KF     Therapy Frequency (OT) daily  -KF     Row Name 06/10/22 0854          Therapy Plan Review/Discharge Plan (OT)    Anticipated Discharge Disposition (OT) inpatient rehabilitation facility;other (see comments)  planned transition to [facility]  -     Row Name 06/10/22 0854          Vital Signs    Pre Systolic BP Rehab --  RN cleared VSS  -KF     Pre SpO2 (%) 97  -KF     O2 Delivery Pre Treatment supplemental O2  -KF     Intra SpO2 (%) 90  -KF     O2 Delivery Intra Treatment room air  -KF     Post SpO2 (%) 97  -KF     O2 Delivery Post Treatment supplemental O2  -KF     Pre Patient Position Sitting  -KF     Intra Patient Position Standing  -KF      Post Patient Position Sitting  -KF     Rest Breaks  1  -KF     Row Name 06/10/22 0854          Positioning and Restraints    Pre-Treatment Position sitting in chair/recliner  -KF     Post Treatment Position chair  -KF     In Chair notified nsg;reclined;sitting;call light within reach;encouraged to call for assist;exit alarm on;with nsg;legs elevated;waffle cushion  -KF           User Key  (r) = Recorded By, (t) = Taken By, (c) = Cosigned By    Initials Name Provider Type    Noni Munoz OT Occupational Therapist               Outcome Measures     Row Name 06/10/22 0856          How much help from another is currently needed...    Putting on and taking off regular lower body clothing? 3  -KF     Bathing (including washing, rinsing, and drying) 3  -KF     Toileting (which includes using toilet bed pan or urinal) 3  -KF     Putting on and taking off regular upper body clothing 3  -KF     Taking care of personal grooming (such as brushing teeth) 3  -KF     Eating meals 4  -KF     AM-PAC 6 Clicks Score (OT) 19  -KF     Row Name 06/10/22 0856          Functional Assessment    Outcome Measure Options AM-PAC 6 Clicks Daily Activity (OT)  -KF           User Key  (r) = Recorded By, (t) = Taken By, (c) = Cosigned By    Initials Name Provider Type    Noni Munoz OT Occupational Therapist                Occupational Therapy Education                 Title: PT OT SLP Therapies (In Progress)     Topic: Occupational Therapy (In Progress)     Point: ADL training (Done)     Description:   Instruct learner(s) on proper safety adaptation and remediation techniques during self care or transfers.   Instruct in proper use of assistive devices.              Learning Progress Summary           Patient Acceptance, E,TB,D, VU,DU,NR by KF at 6/10/2022 0856    Acceptance, E,D, NR by SARA at 6/7/2022 0921    Acceptance, E,D, VU,DU,NR by TORSTEN at 6/3/2022 1538    Comment: PLB, safety awareness w/ AD                   Point: Home  exercise program (Not Started)     Description:   Instruct learner(s) on appropriate technique for monitoring, assisting and/or progressing therapeutic exercises/activities.              Learner Progress:  Not documented in this visit.          Point: Precautions (Done)     Description:   Instruct learner(s) on prescribed precautions during self-care and functional transfers.              Learning Progress Summary           Patient Acceptance, E,TB,D, VU,DU,NR by  at 6/10/2022 0856    Acceptance, E,D, NR by JKATHLEEN at 6/7/2022 0921    Acceptance, E,D, VU,DU,NR by  at 6/3/2022 1538    Comment: PLB, safety awareness w/ AD                   Point: Body mechanics (Done)     Description:   Instruct learner(s) on proper positioning and spine alignment during self-care, functional mobility activities and/or exercises.              Learning Progress Summary           Patient Acceptance, E,TB,D, VU,DU,NR by  at 6/10/2022 0856    Acceptance, E,D, NR by SARA at 6/7/2022 0921    Acceptance, E,D, VU,DU,NR by  at 6/3/2022 1538    Comment: PLB, safety awareness w/ AD                               User Key     Initials Effective Dates Name Provider Type Discipline     06/16/21 -  Noni Castellon, OT Occupational Therapist OT    SARA 06/16/21 -  Olimpia Duggan OT Occupational Therapist OT     06/16/21 -  Matthew Sloan OT Occupational Therapist OT              OT Recommendation and Plan  Therapy Frequency (OT): daily  Plan of Care Review  Plan of Care Reviewed With: patient  Progress: improving  Outcome Evaluation: Pt completed 100ft functional mob tolerated standing TA well CGA at FWW, declined grooming at this time, progressing cont IPOT per POC     Time Calculation:    Time Calculation- OT     Row Name 06/10/22 0752             Time Calculation- OT    OT Start Time 0752  -KF      OT Received On 06/10/22  -KF              Timed Charges    90259 - OT Therapeutic Activity Minutes 13  -KF              Total Minutes    Timed  Charges Total Minutes 13  -KF       Total Minutes 13  -KF            User Key  (r) = Recorded By, (t) = Taken By, (c) = Cosigned By    Initials Name Provider Type    Noni Munoz OT Occupational Therapist              Therapy Charges for Today     Code Description Service Date Service Provider Modifiers Qty    [id number]  OT THERAPEUTIC ACT EA 15 MIN 6/10/2022 Noni Castellon OT GO 1               Noni Castellon OT  6/10/2022    Electronically signed by Noni Castellon OT at 06/10/22 0858

## 2022-06-11 LAB
QT INTERVAL: 496 MS
QTC INTERVAL: 531 MS

## 2022-06-11 NOTE — OUTREACH NOTE
Prep Survey    Flowsheet Row Responses   [facility] patient discharged from? [place]   Is LACE score < 7 ? No   Emergency Room discharge w/ pulse ox? No   Eligibility [facility]   Date of Admission 06/02/22   Date of Discharge 06/10/22   Discharge Disposition Home or Self Care   Discharge diagnosis a/c Resp failure   Does the patient have one of the following disease processes/diagnoses(primary or secondary)? Other   Does the patient have Home health ordered? Yes   What is the Home health agency?  [facility]   Is there a DME ordered? No   Prep survey completed? Yes          NAHOMY HODGSON - Registered Nurse

## 2022-06-13 ENCOUNTER — HOME HEALTH ADMISSION (OUTPATIENT)
Dept: HOME HEALTH SERVICES | Facility: HOME HEALTHCARE | Age: 87
End: 2022-06-13

## 2022-06-13 ENCOUNTER — TELEPHONE (OUTPATIENT)
Dept: INTERNAL MEDICINE | Facility: CLINIC | Age: 87
End: 2022-06-13

## 2022-06-13 ENCOUNTER — TRANSITIONAL CARE MANAGEMENT TELEPHONE ENCOUNTER (OUTPATIENT)
Dept: CALL CENTER | Facility: HOSPITAL | Age: 87
End: 2022-06-13

## 2022-06-13 ENCOUNTER — OFFICE VISIT (OUTPATIENT)
Dept: INTERNAL MEDICINE | Facility: CLINIC | Age: 87
End: 2022-06-13

## 2022-06-13 VITALS
HEIGHT: 66 IN | OXYGEN SATURATION: 97 % | TEMPERATURE: 97.5 F | WEIGHT: 185 LBS | DIASTOLIC BLOOD PRESSURE: 60 MMHG | HEART RATE: 98 BPM | SYSTOLIC BLOOD PRESSURE: 118 MMHG | BODY MASS INDEX: 29.73 KG/M2

## 2022-06-13 DIAGNOSIS — E11.65 UNCONTROLLED TYPE 2 DIABETES MELLITUS WITH HYPERGLYCEMIA: ICD-10-CM

## 2022-06-13 DIAGNOSIS — I35.0 AORTIC VALVE STENOSIS, ETIOLOGY OF CARDIAC VALVE DISEASE UNSPECIFIED: ICD-10-CM

## 2022-06-13 DIAGNOSIS — I50.9 ACUTE ON CHRONIC CONGESTIVE HEART FAILURE, UNSPECIFIED HEART FAILURE TYPE: ICD-10-CM

## 2022-06-13 DIAGNOSIS — K74.60 CIRRHOSIS OF LIVER WITHOUT ASCITES, UNSPECIFIED HEPATIC CIRRHOSIS TYPE: Primary | ICD-10-CM

## 2022-06-13 DIAGNOSIS — I10 ESSENTIAL HYPERTENSION: ICD-10-CM

## 2022-06-13 PROBLEM — R74.01 ELEVATED TRANSAMINASE LEVEL: Status: RESOLVED | Noted: 2022-06-03 | Resolved: 2022-06-13

## 2022-06-13 PROBLEM — R79.89 ELEVATED LFTS: Status: RESOLVED | Noted: 2021-03-28 | Resolved: 2022-06-13

## 2022-06-13 PROBLEM — I26.99 PULMONARY EMBOLISM (HCC): Status: RESOLVED | Noted: 2021-03-28 | Resolved: 2022-06-13

## 2022-06-13 PROCEDURE — 1111F DSCHRG MED/CURRENT MED MERGE: CPT | Performed by: INTERNAL MEDICINE

## 2022-06-13 PROCEDURE — 99213 OFFICE O/P EST LOW 20 MIN: CPT | Performed by: INTERNAL MEDICINE

## 2022-06-13 NOTE — OUTREACH NOTE
Call Center TCM Note    Flowsheet Row Responses   [facility] patient discharged from? [place]   Does the patient have one of the following disease processes/diagnoses(primary or secondary)? Other   TCM attempt successful? Yes  [No verbal release however daughter was involved in care. ]   Call start time 0931   Call end time 0934   Discharge diagnosis a/c Resp failure   Person spoke with today (if not patient) and relationship [place]-daughter    Meds reviewed with patient/caregiver? Yes   Is the patient having any side effects they believe may be caused by any medication additions or changes? No   Does the patient have all medications ordered at discharge? Yes   Is the patient taking all medications as directed (includes completed medication regime)? N/A   Does the patient have a primary care provider?  Yes   Does the patient have an appointment with their PCP within 7 days of discharge? Yes   Comments regarding PCP Hospital d/c f/u appt is on 6/13/22 at 11:45 am    Has the patient kept scheduled appointments due by today? N/A   What is the Home health agency?  [facility]   Home health comments [facility] called daughter to schedule a visit. Daughter told them to call patient. She went on to tell RN that she's been giving all she has for a while now while working and she has no more energy for this.    Psychosocial issues? No   Did the patient receive a copy of their discharge instructions? Yes   Nursing interventions Reviewed instructions with patient   What is the patient's perception of their health status since discharge? Same   Is the patient/caregiver able to teach back signs and symptoms related to disease process for when to call PCP? Yes   Is the patient/caregiver able to teach back signs and symptoms related to disease process for when to call 911? Yes   Is the patient/caregiver able to teach back the hierarchy of who to call/visit for symptoms/problems? PCP, Specialist, Home health nurse,  Urgent Care, ED, 911 Yes   If the patient is a current smoker, are they able to teach back resources for cessation? Not a smoker   TCM call completed? Yes   Wrap up additional comments Daughter is upset with hospitalist and hospital. She wanted pt to go to rehab.           Bella Cooley RN    6/13/2022, 09:37 EDT

## 2022-06-13 NOTE — PROGRESS NOTES
Transitional Care Follow Up Visit  Subjective     Blane Dietz is a 86 y.o. male who presents for a transitional care management visit.    Within 48 business hours after discharge our office contacted him via telephone to coordinate his care and needs.      I reviewed and discussed the details of that call along with the discharge summary, hospital problems, inpatient lab results, inpatient diagnostic studies, and consultation reports with Blane.     Current outpatient and discharge medications have been reconciled for the patient.  Reviewed by: David Em MD      Date of TCM Phone Call 6/10/2022   [facility]   Date of Admission 6/2/2022   Date of Discharge 6/10/2022   Discharge Disposition Home or Self Care     Risk for Readmission (LACE) Score: 11 (6/10/2022  6:02 AM)      History of Present Illness   Course During Hospital Stay: This is my first encounter with this 86-year-old male with a longstanding history of diabetes, aortic stenosis with chronic respiratory failure on O2 at 2 L via nasal cannula he has a history of prostate cancer currently on Lupron.  He was recently admitted through the emergency room at [facility] for acute congestive heart failure.  It was thought that this was secondary to poor compliance to his medication  He was diuresed with IV diuretic therapy subsequently switched over to oral Lasix.  In addition to this he was started on Entresto at a low dose which he is tolerating well  He has had a history of pulmonary embolism for which she has completed a year of Eliquis therapy this was discontinued during his current hospital stay because he had finished a year of therapy  Incidental finding of cirrhosis with a small liver lesion which may need further evaluation.  Patient has some dyspnea on exertion and orthopnea.  He uses home oxygen as needed   The following portions of the patient's history were reviewed and updated as appropriate: allergies, current medications,  past family history, past medical history, past social history, past surgical history and problem list.    Review of Systems   Constitutional: Positive for fatigue. Negative for activity change, appetite change and fever.   HENT: Negative for congestion, ear discharge, ear pain and trouble swallowing.    Eyes: Negative for photophobia and visual disturbance.   Respiratory: Positive for shortness of breath. Negative for cough.    Cardiovascular: Negative for chest pain and palpitations.   Gastrointestinal: Negative for abdominal distention, abdominal pain, constipation, diarrhea, nausea and vomiting.   Endocrine: Negative.    Genitourinary: Negative for dysuria, hematuria and urgency.   Musculoskeletal: Positive for arthralgias and gait problem. Negative for back pain, joint swelling and myalgias.   Skin: Negative for color change and rash.   Allergic/Immunologic: Negative.    Neurological: Negative for dizziness, weakness, light-headedness and headaches.   Hematological: Negative for adenopathy. Does not bruise/bleed easily.   Psychiatric/Behavioral: Positive for sleep disturbance. Negative for agitation, confusion and dysphoric mood. The patient is not nervous/anxious.        Objective   Physical Exam  Constitutional:       General: He is not in acute distress.     Appearance: He is well-developed.   HENT:      Nose: Nose normal.   Eyes:      General: No scleral icterus.     Conjunctiva/sclera: Conjunctivae normal.   Neck:      Thyroid: No thyromegaly.      Trachea: No tracheal deviation.   Cardiovascular:      Rate and Rhythm: Normal rate and regular rhythm.      Heart sounds: No murmur heard.    No friction rub.   Pulmonary:      Effort: No respiratory distress.      Breath sounds: Rales present. No wheezing.   Abdominal:      General: There is no distension.      Palpations: Abdomen is soft. There is no mass.      Tenderness: There is no abdominal tenderness. There is no guarding.   Musculoskeletal:          General: Deformity present. Normal range of motion.      Right lower leg: Edema present.      Left lower leg: Edema present.   Lymphadenopathy:      Cervical: No cervical adenopathy.   Skin:     General: Skin is warm and dry.      Findings: No erythema or rash.   Neurological:      Mental Status: He is alert and oriented to person, place, and time.      Cranial Nerves: No cranial nerve deficit.      Coordination: Coordination normal.      Gait: Gait abnormal.      Deep Tendon Reflexes: Reflexes are normal and symmetric.   Psychiatric:         Behavior: Behavior normal.         Thought Content: Thought content normal.         Judgment: Judgment normal.         Assessment & Plan   Diagnoses and all orders for this visit:    1. Cirrhosis of liver without ascites, unspecified hepatic cirrhosis type (HCC) (Primary) has some evidence of fluid overload.  Will need CT abdomen with contrast once his respiratory status improves.    2. Essential hypertension stable with current meds and low-salt diet    3. Aortic valve stenosis, etiology of cardiac valve disease unspecified recent echocardiogram okay following up with cardiology    4. Acute on chronic congestive heart failure, unspecified heart failure type (HCC) continues to have some evidence of fluid overload with dyspnea on exertion.  Crackles at bases of his lungs.  Advised him to take an additional dose of Lasix counseled about low-sodium diet    5. Uncontrolled type 2 diabetes mellitus with hyperglycemia (HCC) continue with the dietary restrictions.  He is moving to assisted living facility where there may be better control on his

## 2022-06-14 ENCOUNTER — HOSPITAL ENCOUNTER (EMERGENCY)
Facility: HOSPITAL | Age: 87
Discharge: HOME | End: 2022-06-14
Attending: FAMILY MEDICINE | Admitting: FAMILY MEDICINE

## 2022-06-14 VITALS
BODY MASS INDEX: 29.03 KG/M2 | HEART RATE: 97 BPM | HEIGHT: 67 IN | TEMPERATURE: 98.3 F | DIASTOLIC BLOOD PRESSURE: 84 MMHG | SYSTOLIC BLOOD PRESSURE: 165 MMHG | WEIGHT: 185 LBS | RESPIRATION RATE: 20 BRPM | OXYGEN SATURATION: 96 %

## 2022-06-14 DIAGNOSIS — R04.0 EPISTAXIS: Primary | ICD-10-CM

## 2022-06-14 PROCEDURE — 99283 EMERGENCY DEPT VISIT LOW MDM: CPT

## 2022-06-14 RX ADMIN — PHENYLEPHRINE HYDROCHLORIDE 2 SPRAY: 0.5 SPRAY NASAL at 03:30

## 2022-06-14 NOTE — ED PROVIDER NOTES
Subjective   86-year-old male with past medical history of aortic stenosis, CHF, diabetes, hyperlipidemia, hypertension presents the emergency department with a nosebleed.  Patient reports he woke up at 1 AM and had a nosebleed out of his left nostril.  Patient reports he put a nasal clamp on it and left it there.  Patient reports he was just discharged from the hospital on the 10th for CHF exacerbation.  Patient reports with his medical problems he felt like he should just come be checked out.  Patient has had the clamp on since 1 AM has had not had any leaking of blood.  Patient has not passed any clots said he was just afraid to take the clamp off.  Is on anticoagulation.      History provided by:  Patient  Nose Bleed  Location:  L nare  Severity:  Moderate  Timing:  Constant  Progression:  Resolved  Chronicity:  New  Context: anticoagulants and CPAP    Relieved by:  Applying pressure  Worsened by:  Nothing  Ineffective treatments:  Applying pressure  Associated symptoms: no blood in oropharynx and no fever        Review of Systems   Constitutional: Negative.  Negative for fever.   HENT: Positive for nosebleeds.    Respiratory: Negative.    Cardiovascular: Negative.  Negative for chest pain.   Gastrointestinal: Negative.  Negative for abdominal pain.   Endocrine: Negative.    Genitourinary: Negative.  Negative for dysuria.   Skin: Negative.    Neurological: Negative.    Psychiatric/Behavioral: Negative.    All other systems reviewed and are negative.      Past Medical History:   Diagnosis Date   • Aortic stenosis     status post ROSS procedure, remote, with December 2015 echocardiography revealing normal aortic and pulmonary valve function, normal ejection fraction and mild to moderate MR   • Arthritis    • Bilateral impacted cerumen 11/23/2016   • Bronchitis    • CHF (congestive heart failure) (HCC)    • Chronic gout of right foot 06/13/2016    Impression: 03/08/2016 - stable.;    • COVID-19 vaccine series  completed 05/12/2021    [place] 2nd dose 3/12/21.   • Depression    • Diabetes mellitus (HCC)    • Diverticulitis    • Exogenous obesity    • Gout    • Heart murmur    • History of vitamin D deficiency    • Hypercholesteremia 08/11/2016   • Hyperlipidemia    • Hypertension    • Kidney lesion    • Malignant neoplasm of prostate (HCC)    • Morbid obesity (HCC) 08/11/2016   • Pneumonia 05/12/2021   • Primary osteoarthritis involving multiple joints 06/13/2016    Impression: 03/08/2016 - stable;    • Pulmonary embolism (HCC)    • Sleep apnea 05/12/2021    C-Pap   • Uncontrolled type 2 diabetes mellitus with hyperglycemia ([facility]) 06/13/2016    Impression: 03/08/2016 - seeing Endo .;    • Vertigo        Allergies   Allergen Reactions   • Ampicillin Anaphylaxis   • Medrol [Methylprednisolone] Unknown - High Severity     Made his blood sugar get really high, can't take this   • Myrbetriq [Mirabegron] Unknown - High Severity     High BP   • Penicillins Anaphylaxis and Shortness Of Breath   • Bee Venom Swelling   • Melatonin Hallucinations       Past Surgical History:   Procedure Laterality Date   • CARDIAC VALVE REPLACEMENT  05/12/2021    Ross procedure 22 years ago   • COLON SURGERY     • COLONOSCOPY     • COLOSTOMY  1999   • COLOSTOMY REVISION     • CYSTOSCOPY N/A 12/7/2021    Procedure: CYSTOSCOPY FLEXIBLE;  Surgeon: José Miguel Villafuerte Jr., MD;  Location: [facility] OR;  Service: Urology;  Laterality: N/A;   • EXPLORATORY LAPAROTOMY      With Tissue Removal   • LIPOMA EXCISION     • MOHS SURGERY     • NEPHROURETERECTOMY Right 12/7/2021    Procedure: RIGHT NEPHROURETERECTOMY LAPAROSCOPIC WITH DAVINCI ROBOT;  Surgeon: José Miguel Villafuerte Jr., MD;  Location: [facility] OR;  Service: Robotics - DaVinci;  Laterality: Right;   • OTHER SURGICAL HISTORY      Porcine Valve   • PROSTATE SURGERY     • PROSTATECTOMY  2000     History of Prostatectomy Perineal Radical   • SKIN CANCER EXCISION      from head and lip   • TONSILLECTOMY          Family History   Problem Relation Age of Onset   • Diabetes Mother    • Lung cancer Mother    • Cancer Mother    • Heart disease Father    • Breast cancer Other    • Cancer Other    • Hypertension Other    • Migraines Other    • Obesity Other    • Diabetes Other        Social History     Socioeconomic History   • Marital status: Single   Tobacco Use   • Smoking status: Never Smoker   • Smokeless tobacco: Never Used   Vaping Use   • Vaping Use: Never used   Substance and Sexual Activity   • Alcohol use: No   • Drug use: No   • Sexual activity: Defer           Objective   Physical Exam  Vitals and nursing note reviewed.   Constitutional:       Appearance: Normal appearance.   HENT:      Head: Normocephalic and atraumatic.      Right Ear: Tympanic membrane normal.      Left Ear: Tympanic membrane normal.      Nose:      Comments: I remove the nasal clamp.  I also took toilet paper out of the left naris.  No active bleeding was appreciated.     Mouth/Throat:      Mouth: Mucous membranes are moist.   Eyes:      Pupils: Pupils are equal, round, and reactive to light.   Cardiovascular:      Rate and Rhythm: Normal rate and regular rhythm.   Pulmonary:      Effort: Pulmonary effort is normal.   Abdominal:      General: Abdomen is flat.   Musculoskeletal:      Cervical back: Normal range of motion.   Skin:     General: Skin is warm.      Capillary Refill: Capillary refill takes less than 2 seconds.   Neurological:      General: No focal deficit present.      Mental Status: He is alert.   Psychiatric:         Mood and Affect: Mood normal.         Behavior: Behavior normal.         Thought Content: Thought content normal.         Judgment: Judgment normal.         Procedures           ED Course                                                 MDM  Number of Diagnoses or Management Options  Epistaxis: new and does not require workup  Diagnosis management comments: 86-year-old male with multiple comorbid condition presents  to the emergency department with a 1 year history of a left nares nosebleed.  Patient had placed a nasal clamp at home and just felt like with all of his medical problems he should come in to be evaluated especially since he is on anticoagulation.  Upon arrival patient is hemodynamically stable, I remove the nasal clamp and remove the toilet paper that is in his nose there is no active bleeding.  Patient was observed for almost 1 hour with no return of bleeding patient was given a cottonball with phenylephrine to ensure that the bleeding did not return.  Advised patient to leave that in until noon and then follow-up with the ENT patient verbalized understanding was in agreement.       Amount and/or Complexity of Data Reviewed  Independent visualization of images, tracings, or specimens: yes    Risk of Complications, Morbidity, and/or Mortality  Presenting problems: low  Diagnostic procedures: low  Management options: low        Final diagnoses:   Epistaxis       ED Disposition  ED Disposition     ED Disposition   Discharge    Condition   Stable    Comment   --             David Em MD  [address] [place] [id number] [phone]    Schedule an appointment as soon as possible for a visit            Medication List      Changed    allopurinol 300 MG tablet  Commonly known as: ZYLOPRIM  TAKE 1 TABLET BY MOUTH ONE TIME A DAY  What changed: additional instructions     fluticasone 50 MCG/ACT nasal spray  Commonly known as: Flonase  2 sprays into the nostril(s) as directed by provider Daily. 2 puffs each nostril  What changed:   · when to take this  · reasons to take this  · additional instructions     Lantus 100 UNIT/ML injection  Generic drug: insulin glargine  Up to 50 units daily  What changed:   · how much to take  · how to take this  · when to take this     metFORMIN  MG 24 hr tablet  Commonly known as: GLUCOPHAGE-XR  Take 1 tablet by mouth 2 (Two) Times a Day.  What changed: when to take  this     pravastatin 40 MG tablet  Commonly known as: PRAVACHOL  TAKE ONE TABLET BY MOUTH EVERY NIGHT AT BEDTIME  What changed: when to take this     Trulicity 1.5 MG/0.5ML solution pen-injector  Generic drug: Dulaglutide  Inject 1.5 mg under the skin into the appropriate area as directed 1 (One) Time Per Week. NO FURTHER REFILLS WITHOUT APPT  What changed: additional instructions             Yue Elena,   06/15/22 0029

## 2022-06-14 NOTE — DISCHARGE INSTRUCTIONS
Remove cotton ball in 12 hours. Use BIPAP this pm in tolerated  Follow with PCP for ent referral  Return to the ED if symptoms worsen or return

## 2022-06-15 RX ORDER — EMPAGLIFLOZIN 10 MG/1
TABLET, FILM COATED ORAL
Qty: 90 TABLET | Refills: 1 | Status: SHIPPED | OUTPATIENT
Start: 2022-06-15 | End: 2022-11-11 | Stop reason: SDUPTHER

## 2022-06-22 ENCOUNTER — APPOINTMENT (OUTPATIENT)
Dept: ONCOLOGY | Facility: HOSPITAL | Age: 87
End: 2022-06-22

## 2022-06-22 ENCOUNTER — TELEPHONE (OUTPATIENT)
Dept: ONCOLOGY | Facility: OTHER | Age: 87
End: 2022-06-22

## 2022-06-22 NOTE — TELEPHONE ENCOUNTER
PT CALLING TO [facility] HIS APPT FOR TODAY - HE WILL CALL BACK LATER TO R/S. CALLED [facility] AND SPOKE WITH DAVID, HE WILL [facility] APPT.

## 2022-06-28 ENCOUNTER — READMISSION MANAGEMENT (OUTPATIENT)
Dept: CALL CENTER | Facility: HOSPITAL | Age: 87
End: 2022-06-28

## 2022-06-28 NOTE — OUTREACH NOTE
Medical Week 3 Survey    Flowsheet Row Responses   [facility] patient discharged from? [facility]   Does the patient have one of the following disease processes/diagnoses(primary or secondary)? Other   Week 3 attempt successful? No   Unsuccessful attempts Attempt 1          ANKITA HUBER - Registered Nurse

## 2022-07-05 ENCOUNTER — READMISSION MANAGEMENT (OUTPATIENT)
Dept: CALL CENTER | Facility: HOSPITAL | Age: 87
End: 2022-07-05

## 2022-07-05 NOTE — OUTREACH NOTE
Medical Week 4 Survey    Flowsheet Row Responses   [facility] patient discharged from? [place]   Does the patient have one of the following disease processes/diagnoses(primary or secondary)? Other   Week 4 attempt successful? No          CLINT H - Registered Nurse

## 2022-07-19 ENCOUNTER — OFFICE VISIT (OUTPATIENT)
Dept: ENDOCRINOLOGY | Facility: CLINIC | Age: 87
End: 2022-07-19

## 2022-07-19 VITALS
WEIGHT: 191 LBS | DIASTOLIC BLOOD PRESSURE: 74 MMHG | OXYGEN SATURATION: 95 % | HEIGHT: 66 IN | SYSTOLIC BLOOD PRESSURE: 132 MMHG | BODY MASS INDEX: 30.7 KG/M2 | HEART RATE: 63 BPM

## 2022-07-19 DIAGNOSIS — E11.65 UNCONTROLLED TYPE 2 DIABETES MELLITUS WITH HYPERGLYCEMIA: Primary | ICD-10-CM

## 2022-07-19 LAB
EXPIRATION DATE: ABNORMAL
GLUCOSE BLDC GLUCOMTR-MCNC: 213 MG/DL (ref 70–130)
Lab: ABNORMAL

## 2022-07-19 PROCEDURE — 82947 ASSAY GLUCOSE BLOOD QUANT: CPT | Performed by: INTERNAL MEDICINE

## 2022-07-19 PROCEDURE — 99214 OFFICE O/P EST MOD 30 MIN: CPT | Performed by: INTERNAL MEDICINE

## 2022-07-19 RX ORDER — BLOOD SUGAR DIAGNOSTIC
STRIP MISCELLANEOUS
Qty: 100 EACH | Refills: 3 | Status: SHIPPED | OUTPATIENT
Start: 2022-07-19

## 2022-07-19 RX ORDER — DULAGLUTIDE 1.5 MG/.5ML
1.5 INJECTION, SOLUTION SUBCUTANEOUS WEEKLY
Qty: 7 ML | Refills: 2 | Status: SHIPPED | OUTPATIENT
Start: 2022-07-19 | End: 2023-01-19 | Stop reason: SDUPTHER

## 2022-07-19 RX ORDER — INSULIN GLARGINE 100 [IU]/ML
INJECTION, SOLUTION SUBCUTANEOUS
Qty: 20 ML | Refills: 2 | Status: SHIPPED | OUTPATIENT
Start: 2022-07-19 | End: 2023-01-19 | Stop reason: SDUPTHER

## 2022-07-19 RX ORDER — METFORMIN HYDROCHLORIDE 500 MG/1
500 TABLET, EXTENDED RELEASE ORAL
Qty: 90 TABLET | Refills: 3 | Status: ON HOLD | OUTPATIENT
Start: 2022-07-19 | End: 2022-10-26

## 2022-07-19 NOTE — PROGRESS NOTES
"     Office Note      Date: 2022  Patient Name: Blane Dietz  MRN: [id number]  : 1935    Chief Complaint   Patient presents with   • Diabetes       History of Present Illness:   Blane Dietz is a 86 y.o. male who presents for Diabetes - type 2  He is on jardiance, insulin, metformin and daily insulin.  --------------  Since  Last time he was in the hospital for CHF and has been diagnosed with cirrhosis.   Cause unclear.     bg checks are done rarely   No serious hypos    He  Is now living at assisted living .     Last A1c:  Hemoglobin A1C   Date Value Ref Range Status   2022 9.10 (H) 4.80 - 5.60 % Final       Changes in health since last visit: see above . Last eye exam up to date.    Subjective          Review of Systems:   Review of Systems   Constitutional: Positive for fatigue.   Respiratory: Positive for shortness of breath.        The following portions of the patient's history were reviewed and updated as appropriate: allergies, current medications, past family history, past medical history, past social history, past surgical history and problem list.    Objective     Visit Vitals  /74   Pulse 63   Ht 167.6 cm (66\")   Wt 86.6 kg (191 lb)   SpO2 95%   BMI 30.83 kg/m²       Labs:    CMP  Lab Results   Component Value Date    GLUCOSE 98 2022    BUN 48 (H) 2022    CREATININE 1.35 (H) 2022    EGFRIFNONA 55 (L) 2022    EGFRIFAFRI 95 10/14/2020    BCR 35.6 (H) 2022    K 4.2 2022    CO2 32.0 (H) 2022    CALCIUM 9.0 2022    PROTENTOTREF 6.8 10/14/2020    LABIL2 2.0 10/14/2020    AST 48 (H) 2022    ALT 48 (H) 2022        CBC w/DIFF  Lab Results   Component Value Date    WBC 9.88 2022    RBC 4.58 2022    HGB 12.2 (L) 2022    HCT 38.8 2022    MCV 84.7 2022    MCH 26.6 2022    MCHC 31.4 (L) 2022    RDW 15.4 2022    RDWSD 46.4 2022    MPV 11.2 2022     2022    " NEUTRORELPCT 65.8 06/08/2022    LYMPHORELPCT 18.6 (L) 06/08/2022    MONORELPCT 8.1 06/08/2022    EOSRELPCT 3.1 06/08/2022    BASORELPCT 2.3 (H) 06/08/2022    AUTOIGPER 2.1 (H) 06/08/2022    NEUTROABS 6.49 06/08/2022    LYMPHSABS 1.84 06/08/2022    MONOSABS 0.80 06/08/2022    EOSABS 0.31 06/08/2022    BASOSABS 0.23 (H) 06/08/2022    AUTOIGNUM 0.21 (H) 06/08/2022    NRBC 0.0 06/08/2022       Physical Exam:  Physical Exam  Vitals reviewed.   Constitutional:       Appearance: Normal appearance.   Neurological:      Mental Status: He is alert.   Psychiatric:         Mood and Affect: Mood normal.         Thought Content: Thought content normal.         Judgment: Judgment normal.          Assessment / Plan      Assessment & Plan:  Problem List Items Addressed This Visit        Other    Uncontrolled type 2 diabetes mellitus with hyperglycemia (HCC) - Primary    Overview     Impression: 03/08/2016 - seeing Endo .;            Current Assessment & Plan     a1c of 9.1 is way too high. I gave him an go by to adjust his lantus to get his fasting bg 100-150 which should get his a1c to about 8           Relevant Medications    metFORMIN ER (GLUCOPHAGE-XR) 500 MG 24 hr tablet    Lantus 100 UNIT/ML injection    Dulaglutide (Trulicity) 1.5 MG/0.5ML solution pen-injector    Jardiance 10 MG tablet tablet    Other Relevant Orders    POC Glucose, Blood (Completed)           Xavier Boston MD   07/19/2022

## 2022-07-19 NOTE — ASSESSMENT & PLAN NOTE
a1c of 9.1 is way too high. I gave him an go by to adjust his lantus to get his fasting bg 100-150 which should get his a1c to about 8

## 2022-08-01 DIAGNOSIS — M1A.0710 IDIOPATHIC CHRONIC GOUT OF RIGHT FOOT WITHOUT TOPHUS: ICD-10-CM

## 2022-08-01 RX ORDER — PRAVASTATIN SODIUM 40 MG
40 TABLET ORAL
Qty: 90 TABLET | Refills: 3 | Status: SHIPPED | OUTPATIENT
Start: 2022-08-01

## 2022-08-01 RX ORDER — ALLOPURINOL 300 MG/1
300 TABLET ORAL DAILY
Qty: 90 TABLET | Refills: 3 | Status: SHIPPED | OUTPATIENT
Start: 2022-08-01 | End: 2022-10-30 | Stop reason: HOSPADM

## 2022-08-01 NOTE — TELEPHONE ENCOUNTER
Caller: DietzBlane estrada    Relationship: Self    Best call back number: [phone]    Requested Prescriptions:   Requested Prescriptions     Pending Prescriptions Disp Refills   • sacubitril-valsartan (ENTRESTO) 24-26 MG tablet 60 tablet 0     Sig: Take 1 tablet by mouth Every 12 (Twelve) Hours.   • pravastatin (PRAVACHOL) 40 MG tablet 90 tablet 0     Sig: Take 1 tablet by mouth every night at bedtime.   • allopurinol (ZYLOPRIM) 300 MG tablet 90 tablet 0     Sig: Take 1 tablet by mouth Daily. 200001        Pharmacy where request should be sent: [facility] [phone] [facility] [phone] FX     Additional details provided by patient: PATIENT IS NEEDING DR CHAUDHARY TO TAKE OVER THESE PRESCRIPTIONS. THE PATIENT HAS A DAY OR TWO LEFT ON THE MEDICATIONS     Does the patient have less than a 3 day supply:  [x] Yes  [] No    Cruz Ellison Rep   08/01/22 14:19 EDT

## 2022-08-01 NOTE — TELEPHONE ENCOUNTER
Rx Refill Note  Requested Prescriptions     Pending Prescriptions Disp Refills   • sacubitril-valsartan (ENTRESTO) 24-26 MG tablet 60 tablet 0     Sig: Take 1 tablet by mouth Every 12 (Twelve) Hours.   • pravastatin (PRAVACHOL) 40 MG tablet 90 tablet 0     Sig: Take 1 tablet by mouth every night at bedtime.   • allopurinol (ZYLOPRIM) 300 MG tablet 90 tablet 0     Sig: Take 1 tablet by mouth Daily. 200001      Last office visit with prescribing clinician: 6/13/2022      Next office visit with prescribing clinician: Visit date not found            Abbey Patterson LPN  08/01/22, 14:25 EDT     These were prescribed by an outside MD, patient wants PCP to rx.

## 2022-09-14 ENCOUNTER — APPOINTMENT (OUTPATIENT)
Dept: ONCOLOGY | Facility: HOSPITAL | Age: 87
End: 2022-09-14

## 2022-10-19 ENCOUNTER — OFFICE VISIT (OUTPATIENT)
Dept: ONCOLOGY | Facility: CLINIC | Age: 87
End: 2022-10-19

## 2022-10-19 ENCOUNTER — APPOINTMENT (OUTPATIENT)
Dept: ONCOLOGY | Facility: HOSPITAL | Age: 87
End: 2022-10-19

## 2022-10-19 VITALS
TEMPERATURE: 97.9 F | HEIGHT: 66 IN | HEART RATE: 111 BPM | WEIGHT: 190 LBS | RESPIRATION RATE: 14 BRPM | SYSTOLIC BLOOD PRESSURE: 158 MMHG | DIASTOLIC BLOOD PRESSURE: 88 MMHG | BODY MASS INDEX: 30.53 KG/M2 | OXYGEN SATURATION: 96 %

## 2022-10-19 DIAGNOSIS — C83.39 DIFFUSE LARGE B-CELL LYMPHOMA OF SOLID ORGAN EXCLUDING SPLEEN: Primary | ICD-10-CM

## 2022-10-19 PROBLEM — C85.99: Status: ACTIVE | Noted: 2021-12-24

## 2022-10-19 PROBLEM — Z85.46 HISTORY OF MALIGNANT NEOPLASM OF PROSTATE: Status: ACTIVE | Noted: 2021-10-31

## 2022-10-19 PROCEDURE — 99214 OFFICE O/P EST MOD 30 MIN: CPT | Performed by: INTERNAL MEDICINE

## 2022-10-19 NOTE — PROGRESS NOTES
DATE OF VISIT: 10/19/2022    REASON FOR VISIT: Followup for: 1.  Diffuse large B-cell lymphoma 2.  Prostate cancer     PROBLEM LIST:  1. Prostate cancer, initially presented as [id number] status post radical  prostatectomy 2000.  A.  Patient is on Lupron plus Prolia  B.  Tried to add apalutamide second rising PSA patient declined.  2.  Right-sided pulmonary embolisms:  A.  Currently on Eliquis twice a day  3.  Type 2 diabetes  4. Hhypertension  5. Hypercholesterolemia.  6.  Right subsegmental PE:  A. Started on Eliquis twice a day March 2021  7.  Osteopenia  8.  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  Status post right nephrectomy December 7, 2021  B.  Started adjuvant chemotherapy R-CHOP January 14, 2022, status post 3 cycles completed February 23, 2022      HISTORY OF PRESENT ILLNESS: The patient is a very pleasant 87 y.o. male  with past medical history significant for diffuse large B-cell lymphoma right kidney diagnosed December 2021.  Patient status post 3 cycles of chemotherapy with R-CHOP.  Repeat PET scan showed no evidence of residual disease. The  patient is here today for scheduled follow-up visit.    SUBJECTIVE: Miki is here today with his daughter.  He is complaining of lower extremity edema and shortness of breath.  He I have not seen him since March 2022.  His been admitted to the hospital since then with heart failure and June 2022.    Past History:  Medical History: has a past medical history of Aortic stenosis, Arthritis, Bilateral impacted cerumen (11/23/2016), Bronchitis, CHF (congestive heart failure) ([facility]), Chronic gout of right foot (06/13/2016), COVID-19 vaccine series completed (05/12/2021), Depression, Diabetes mellitus ([facility]), Diverticulitis, Exogenous obesity, Gout, Heart murmur, History of vitamin D deficiency, Hypercholesteremia (08/11/2016), Hyperlipidemia, Hypertension, Kidney lesion, Malignant neoplasm of prostate (HCC), Morbid obesity (HCC) (08/11/2016),  Pneumonia (05/12/2021), Primary osteoarthritis involving multiple joints (06/13/2016), Pulmonary embolism (HCC), Sleep apnea (05/12/2021), Uncontrolled type 2 diabetes mellitus with hyperglycemia (HCC) (06/13/2016), and Vertigo.   Surgical History: has a past surgical history that includes Colostomy (1999); Other surgical history; Exploratory laparotomy; Colonoscopy; Revision Colostomy; Prostate surgery; Prostatectomy (2000); Colon surgery; Tonsillectomy; Skin cancer excision; Lipoma Excision; Mohs surgery; Cardiac valve replacement (05/12/2021); nephroureterectomy (Right, 12/7/2021); and Cystoscopy (N/A, 12/7/2021).   Family History: family history includes Breast cancer in an other family member; Cancer in his mother and another family member; Diabetes in his mother and another family member; Heart disease in his father; Hypertension in an other family member; Lung cancer in his mother; Migraines in an other family member; Obesity in an other family member.   Social History: reports that he has never smoked. He has never used smokeless tobacco. He reports that he does not drink alcohol and does not use drugs.    (Not in a hospital admission)     Allergies: Ampicillin, Medrol [methylprednisolone], Myrbetriq [mirabegron], Penicillins, Bee venom, and Melatonin     Review of Systems   Constitutional: Positive for fatigue.   Respiratory: Positive for shortness of breath.    Cardiovascular: Positive for leg swelling.   Gastrointestinal: Positive for nausea.   Musculoskeletal: Positive for arthralgias.         Current Outpatient Medications:   •  allopurinol (ZYLOPRIM) 300 MG tablet, Take 1 tablet by mouth Daily. 200001, Disp: 90 tablet, Rfl: 3  •  Cholecalciferol (VITAMIN D3) 2000 UNITS capsule, Take 2,000 Units by mouth 2 (Two) Times a Day., Disp: , Rfl:   •  docusate sodium (Colace) 100 MG capsule, Take 1 capsule by mouth Daily As Needed for Constipation., Disp: 30 capsule, Rfl: 1  •  Dulaglutide (Trulicity) 1.5  "MG/0.5ML solution pen-injector, Inject 1.5 mg under the skin into the appropriate area as directed 1 (One) Time Per Week. Takes on Wednesdays, Disp: 7 mL, Rfl: 2  •  fluticasone (FLONASE) 50 MCG/ACT nasal spray, 2 sprays into the nostril(s) as directed by provider Daily. 2 puffs each nostril (Patient taking differently: 2 sprays into the nostril(s) as directed by provider Daily As Needed for Rhinitis or Allergies.), Disp: 1 bottle, Rfl: 0  •  furosemide (LASIX) 40 MG tablet, Take 1 tablet by mouth Daily., Disp: 30 tablet, Rfl: 0  •  Insulin Syringe-Needle U-100 (B-D INS SYRINGE 0.5CC/31GX5/16) 31G X 5/16\" 0.5 ML misc, Use daily to give insulin, Disp: 100 each, Rfl: 3  •  Jardiance 10 MG tablet tablet, TAKE 1 TABLET BY MOUTH EVERY DAY, Disp: 90 tablet, Rfl: 1  •  Lantus 100 UNIT/ML injection, Up to 50 units daily, Disp: 20 mL, Rfl: 2  •  leuprolide (LUPRON) 22.5 MG injection, Inject 22.5 mg into the appropriate muscle as directed by prescriber Every 3 (Three) Months. Next injection due on 6/22/2022, Disp: , Rfl:   •  metFORMIN ER (GLUCOPHAGE-XR) 500 MG 24 hr tablet, Take 1 tablet by mouth Daily With Breakfast., Disp: 90 tablet, Rfl: 3  •  metoprolol succinate XL (TOPROL-XL) 100 MG 24 hr tablet, TAKE 1 TABLET BY MOUTH EVERY DAY  (Patient taking differently: Take 1 tablet by mouth Daily.), Disp: 90 tablet, Rfl: 0  •  pravastatin (PRAVACHOL) 40 MG tablet, Take 1 tablet by mouth every night at bedtime., Disp: 90 tablet, Rfl: 3  •  sacubitril-valsartan (ENTRESTO) 24-26 MG tablet, Take 1 tablet by mouth Every 12 (Twelve) Hours., Disp: 180 tablet, Rfl: 3    PHYSICAL EXAMINATION:   /88   Pulse 111   Temp 97.9 °F (36.6 °C) (Temporal)   Resp 14   Ht 167.6 cm (66\")   Wt 86.2 kg (190 lb)   SpO2 96%   BMI 30.67 kg/m²    Pain Score    10/19/22 1508   PainSc: 0-No pain                     ECOG Performance Status: 2 - Symptomatic, <50% confined to bed  General Appearance:  alert, cooperative, no apparent distress and " appears stated age   Neurologic/Psychiatric: A&O x 3, gait steady, appropriate affect, strength 5/5 in all muscle groups   HEENT:  Normocephalic, without obvious abnormality, mucous membranes moist   Neck: Supple, symmetrical, trachea midline, no adenopathy;  No thyromegaly, masses, or tenderness   Lungs:   Clear to auscultation bilaterally; respirations regular, even, and unlabored bilaterally   Heart:  Regular rate and rhythm, no murmurs appreciated   Abdomen:   Soft, non-tender, non-distended and no organomegaly   Lymph nodes: No cervical, supraclavicular, inguinal or axillary adenopathy noted   Extremities:  +2 bilateral lower extremities edema    Skin: No rashes, ulcers, or suspicious lesions noted     No visits with results within 2 Week(s) from this visit.   Latest known visit with results is:   Office Visit on 07/19/2022   Component Date Value Ref Range Status   • Glucose 07/19/2022 213 (A)  70 - 130 mg/dL Final   • Lot Number 07/19/2022 [id number]   Final   • Expiration Date 07/19/2022 01-   Final        No results found.    ASSESSMENT: The patient is a very pleasant 87 y.o. male  with diffuse large B-cell lymphoma      PLAN:    1.  Diffuse large B-cell lymphoma stage Ia:  A.  The patient has completed 3 cycles of R-CHOP chemotherapy.  I will do watchful waiting at this point.  B.  I did go over his last CT scan done June 2, 2022 and reassured the patient no evidence of relapsed disease.  I will set him up to have another scan down the road if his performance status improves.  C.  The patient will follow up with us in 1 months with repeated labs.    2.  Prostate cancer:  A.  I will continue Lupron every 3 months.  I will treat him on return.  B.  I will consider adding Xtandi if his PSA gets above 5.    3.  Volume overload:  A.  Most likely from congestive heart failure.  B.  I asked the patient to be more compliant with his treatment.  C.  I asked the patient to start taking his Lasix on a daily  basis until resolution of edema and then he can go to as needed basis.    4.  Right-sided pulmonary embolism:  A. The patient has completed 1 year of treatment.  His repeated CT PE protocol in June 2022 did not reveal any evidence of recurrent PEs.    5.  Hypercholesteremia:  A.  The patient continue pravastatin daily.    6.  Osteopenia:  A.  I will continue Prolia every 6 months.  He will be treated today.  His next dose will be due end of August 2022.    7.  Type 2 diabetes:  A.  The patient will continue [place] and Trulicity.    FOLLOW UP: 1 month with Eligard treatment as well as blood work.    Shannan Ramon MD  10/19/2022

## 2022-10-25 ENCOUNTER — APPOINTMENT (OUTPATIENT)
Dept: CT IMAGING | Facility: HOSPITAL | Age: 87
End: 2022-10-25

## 2022-10-25 ENCOUNTER — APPOINTMENT (OUTPATIENT)
Dept: GENERAL RADIOLOGY | Facility: HOSPITAL | Age: 87
End: 2022-10-25

## 2022-10-25 ENCOUNTER — HOSPITAL ENCOUNTER (INPATIENT)
Facility: HOSPITAL | Age: 87
LOS: 5 days | Discharge: HOME HEALTH | End: 2022-10-30
Attending: EMERGENCY MEDICINE | Admitting: INTERNAL MEDICINE

## 2022-10-25 DIAGNOSIS — N17.9 AKI (ACUTE KIDNEY INJURY): ICD-10-CM

## 2022-10-25 DIAGNOSIS — J90 PLEURAL EFFUSION, BILATERAL: ICD-10-CM

## 2022-10-25 DIAGNOSIS — R60.0 BILATERAL LEG EDEMA: ICD-10-CM

## 2022-10-25 DIAGNOSIS — I10 ESSENTIAL HYPERTENSION: ICD-10-CM

## 2022-10-25 DIAGNOSIS — I50.9 ACUTE CONGESTIVE HEART FAILURE, UNSPECIFIED HEART FAILURE TYPE: Primary | ICD-10-CM

## 2022-10-25 DIAGNOSIS — Z45.2 PICC (PERIPHERALLY INSERTED CENTRAL CATHETER) FLUSH: ICD-10-CM

## 2022-10-25 DIAGNOSIS — I49.3 FREQUENT PVCS: ICD-10-CM

## 2022-10-25 DIAGNOSIS — J96.21 ACUTE ON CHRONIC RESPIRATORY FAILURE WITH HYPOXIA: ICD-10-CM

## 2022-10-25 LAB
ALBUMIN SERPL-MCNC: 3.9 G/DL (ref 3.5–5.2)
ALBUMIN/GLOB SERPL: 1.4 G/DL
ALP SERPL-CCNC: 250 U/L (ref 39–117)
ALT SERPL W P-5'-P-CCNC: 26 U/L (ref 1–41)
ANION GAP SERPL CALCULATED.3IONS-SCNC: 11 MMOL/L (ref 5–15)
AST SERPL-CCNC: 41 U/L (ref 1–40)
BACTERIA UR QL AUTO: NORMAL /HPF
BASOPHILS # BLD AUTO: 0.25 10*3/MM3 (ref 0–0.2)
BASOPHILS NFR BLD AUTO: 2 % (ref 0–1.5)
BILIRUB SERPL-MCNC: 1.2 MG/DL (ref 0–1.2)
BILIRUB UR QL STRIP: NEGATIVE
BUN SERPL-MCNC: 28 MG/DL (ref 8–23)
BUN/CREAT SERPL: 17 (ref 7–25)
CALCIUM SPEC-SCNC: 9.2 MG/DL (ref 8.6–10.5)
CHLORIDE SERPL-SCNC: 104 MMOL/L (ref 98–107)
CLARITY UR: CLEAR
CO2 SERPL-SCNC: 26 MMOL/L (ref 22–29)
COLOR UR: YELLOW
CREAT SERPL-MCNC: 1.65 MG/DL (ref 0.76–1.27)
DEPRECATED RDW RBC AUTO: 58.4 FL (ref 37–54)
EGFRCR SERPLBLD CKD-EPI 2021: 40.4 ML/MIN/1.73
EOSINOPHIL # BLD AUTO: 0.13 10*3/MM3 (ref 0–0.4)
EOSINOPHIL NFR BLD AUTO: 1 % (ref 0.3–6.2)
ERYTHROCYTE [DISTWIDTH] IN BLOOD BY AUTOMATED COUNT: 17.9 % (ref 12.3–15.4)
FLUAV SUBTYP SPEC NAA+PROBE: NOT DETECTED
FLUBV RNA ISLT QL NAA+PROBE: NOT DETECTED
GLOBULIN UR ELPH-MCNC: 2.8 GM/DL
GLUCOSE BLDC GLUCOMTR-MCNC: 146 MG/DL (ref 70–130)
GLUCOSE SERPL-MCNC: 174 MG/DL (ref 65–99)
GLUCOSE UR STRIP-MCNC: ABNORMAL MG/DL
HBA1C MFR BLD: 8.6 % (ref 4.8–5.6)
HCT VFR BLD AUTO: 42.6 % (ref 37.5–51)
HGB BLD-MCNC: 13.2 G/DL (ref 13–17.7)
HGB UR QL STRIP.AUTO: NEGATIVE
HOLD SPECIMEN: NORMAL
HYALINE CASTS UR QL AUTO: NORMAL /LPF
IMM GRANULOCYTES # BLD AUTO: 0.43 10*3/MM3 (ref 0–0.05)
IMM GRANULOCYTES NFR BLD AUTO: 3.4 % (ref 0–0.5)
KETONES UR QL STRIP: NEGATIVE
LEUKOCYTE ESTERASE UR QL STRIP.AUTO: NEGATIVE
LYMPHOCYTES # BLD AUTO: 1.43 10*3/MM3 (ref 0.7–3.1)
LYMPHOCYTES NFR BLD AUTO: 11.4 % (ref 19.6–45.3)
MAGNESIUM SERPL-MCNC: 2.7 MG/DL (ref 1.6–2.4)
MCH RBC QN AUTO: 28 PG (ref 26.6–33)
MCHC RBC AUTO-ENTMCNC: 31 G/DL (ref 31.5–35.7)
MCV RBC AUTO: 90.3 FL (ref 79–97)
MONOCYTES # BLD AUTO: 0.86 10*3/MM3 (ref 0.1–0.9)
MONOCYTES NFR BLD AUTO: 6.9 % (ref 5–12)
NEUTROPHILS NFR BLD AUTO: 75.3 % (ref 42.7–76)
NEUTROPHILS NFR BLD AUTO: 9.39 10*3/MM3 (ref 1.7–7)
NITRITE UR QL STRIP: NEGATIVE
NRBC BLD AUTO-RTO: 0 /100 WBC (ref 0–0.2)
NT-PROBNP SERPL-MCNC: 9609 PG/ML (ref 0–1800)
PH UR STRIP.AUTO: <=5 [PH] (ref 5–8)
PLATELET # BLD AUTO: 135 10*3/MM3 (ref 140–450)
PMV BLD AUTO: 11.7 FL (ref 6–12)
POTASSIUM SERPL-SCNC: 4.7 MMOL/L (ref 3.5–5.2)
PROT SERPL-MCNC: 6.7 G/DL (ref 6–8.5)
PROT UR QL STRIP: ABNORMAL
QT INTERVAL: 422 MS
QT INTERVAL: 430 MS
QTC INTERVAL: 481 MS
QTC INTERVAL: 486 MS
RBC # BLD AUTO: 4.72 10*6/MM3 (ref 4.14–5.8)
RBC # UR STRIP: NORMAL /HPF
REF LAB TEST METHOD: NORMAL
SARS-COV-2 RNA PNL SPEC NAA+PROBE: NOT DETECTED
SODIUM SERPL-SCNC: 141 MMOL/L (ref 136–145)
SP GR UR STRIP: 1.02 (ref 1–1.03)
SQUAMOUS #/AREA URNS HPF: NORMAL /HPF
TROPONIN T SERPL-MCNC: 0.03 NG/ML (ref 0–0.03)
TROPONIN T SERPL-MCNC: 0.03 NG/ML (ref 0–0.03)
UROBILINOGEN UR QL STRIP: ABNORMAL
WBC # UR STRIP: NORMAL /HPF
WBC NRBC COR # BLD: 12.49 10*3/MM3 (ref 3.4–10.8)
WHOLE BLOOD HOLD COAG: NORMAL
WHOLE BLOOD HOLD SPECIMEN: NORMAL

## 2022-10-25 PROCEDURE — 99285 EMERGENCY DEPT VISIT HI MDM: CPT

## 2022-10-25 PROCEDURE — 83735 ASSAY OF MAGNESIUM: CPT | Performed by: PHYSICIAN ASSISTANT

## 2022-10-25 PROCEDURE — 83880 ASSAY OF NATRIURETIC PEPTIDE: CPT | Performed by: EMERGENCY MEDICINE

## 2022-10-25 PROCEDURE — 71045 X-RAY EXAM CHEST 1 VIEW: CPT

## 2022-10-25 PROCEDURE — 63710000001 EMPAGLIFLOZIN 10 MG TABLET: Performed by: NURSE PRACTITIONER

## 2022-10-25 PROCEDURE — 84484 ASSAY OF TROPONIN QUANT: CPT | Performed by: NURSE PRACTITIONER

## 2022-10-25 PROCEDURE — 93005 ELECTROCARDIOGRAM TRACING: CPT | Performed by: EMERGENCY MEDICINE

## 2022-10-25 PROCEDURE — 93005 ELECTROCARDIOGRAM TRACING: CPT

## 2022-10-25 PROCEDURE — 63710000001 SACUBITRIL-VALSARTAN 24-26 MG TABLET: Performed by: NURSE PRACTITIONER

## 2022-10-25 PROCEDURE — 82962 GLUCOSE BLOOD TEST: CPT

## 2022-10-25 PROCEDURE — 74018 RADEX ABDOMEN 1 VIEW: CPT

## 2022-10-25 PROCEDURE — 83036 HEMOGLOBIN GLYCOSYLATED A1C: CPT | Performed by: NURSE PRACTITIONER

## 2022-10-25 PROCEDURE — 63710000001 SENNOSIDES-DOCUSATE 8.6-50 MG TABLET: Performed by: NURSE PRACTITIONER

## 2022-10-25 PROCEDURE — A9270 NON-COVERED ITEM OR SERVICE: HCPCS | Performed by: NURSE PRACTITIONER

## 2022-10-25 PROCEDURE — 25010000002 FUROSEMIDE PER 20 MG: Performed by: PHYSICIAN ASSISTANT

## 2022-10-25 PROCEDURE — 84484 ASSAY OF TROPONIN QUANT: CPT | Performed by: EMERGENCY MEDICINE

## 2022-10-25 PROCEDURE — 25010000002 ENOXAPARIN PER 10 MG: Performed by: NURSE PRACTITIONER

## 2022-10-25 PROCEDURE — 80053 COMPREHEN METABOLIC PANEL: CPT | Performed by: EMERGENCY MEDICINE

## 2022-10-25 PROCEDURE — 85025 COMPLETE CBC W/AUTO DIFF WBC: CPT | Performed by: EMERGENCY MEDICINE

## 2022-10-25 PROCEDURE — 99223 1ST HOSP IP/OBS HIGH 75: CPT | Performed by: FAMILY MEDICINE

## 2022-10-25 PROCEDURE — 81001 URINALYSIS AUTO W/SCOPE: CPT | Performed by: PHYSICIAN ASSISTANT

## 2022-10-25 PROCEDURE — 87636 SARSCOV2 & INF A&B AMP PRB: CPT | Performed by: PHYSICIAN ASSISTANT

## 2022-10-25 PROCEDURE — 63710000001 METOPROLOL SUCCINATE XL 100 MG TABLET SUSTAINED-RELEASE 24 HOUR: Performed by: NURSE PRACTITIONER

## 2022-10-25 PROCEDURE — 63710000001 INSULIN DETEMIR PER 5 UNITS: Performed by: NURSE PRACTITIONER

## 2022-10-25 PROCEDURE — 63710000001 PRAVASTATIN 40 MG TABLET: Performed by: NURSE PRACTITIONER

## 2022-10-25 RX ORDER — BISACODYL 10 MG
10 SUPPOSITORY, RECTAL RECTAL DAILY PRN
Status: DISCONTINUED | OUTPATIENT
Start: 2022-10-25 | End: 2022-10-30 | Stop reason: HOSPADM

## 2022-10-25 RX ORDER — FUROSEMIDE 10 MG/ML
80 INJECTION INTRAMUSCULAR; INTRAVENOUS ONCE
Status: COMPLETED | OUTPATIENT
Start: 2022-10-25 | End: 2022-10-25

## 2022-10-25 RX ORDER — SODIUM CHLORIDE 0.9 % (FLUSH) 0.9 %
10 SYRINGE (ML) INJECTION AS NEEDED
Status: DISCONTINUED | OUTPATIENT
Start: 2022-10-25 | End: 2022-10-30 | Stop reason: HOSPADM

## 2022-10-25 RX ORDER — BISACODYL 5 MG/1
5 TABLET, DELAYED RELEASE ORAL DAILY PRN
Status: DISCONTINUED | OUTPATIENT
Start: 2022-10-25 | End: 2022-10-30 | Stop reason: HOSPADM

## 2022-10-25 RX ORDER — AMOXICILLIN 250 MG
2 CAPSULE ORAL 2 TIMES DAILY
Status: DISCONTINUED | OUTPATIENT
Start: 2022-10-25 | End: 2022-10-30 | Stop reason: HOSPADM

## 2022-10-25 RX ORDER — DEXTROSE MONOHYDRATE 25 G/50ML
25 INJECTION, SOLUTION INTRAVENOUS
Status: DISCONTINUED | OUTPATIENT
Start: 2022-10-25 | End: 2022-10-30 | Stop reason: HOSPADM

## 2022-10-25 RX ORDER — ONDANSETRON 2 MG/ML
4 INJECTION INTRAMUSCULAR; INTRAVENOUS EVERY 6 HOURS PRN
Status: DISCONTINUED | OUTPATIENT
Start: 2022-10-25 | End: 2022-10-30 | Stop reason: HOSPADM

## 2022-10-25 RX ORDER — PRAVASTATIN SODIUM 40 MG
40 TABLET ORAL NIGHTLY
Status: DISCONTINUED | OUTPATIENT
Start: 2022-10-25 | End: 2022-10-30 | Stop reason: HOSPADM

## 2022-10-25 RX ORDER — METOPROLOL SUCCINATE 100 MG/1
100 TABLET, EXTENDED RELEASE ORAL DAILY
Status: DISCONTINUED | OUTPATIENT
Start: 2022-10-25 | End: 2022-10-26

## 2022-10-25 RX ORDER — HEPARIN SODIUM 10000 [USP'U]/100ML
17 INJECTION, SOLUTION INTRAVENOUS
Status: DISCONTINUED | OUTPATIENT
Start: 2022-10-26 | End: 2022-10-27

## 2022-10-25 RX ORDER — ACETAMINOPHEN 325 MG/1
650 TABLET ORAL EVERY 4 HOURS PRN
Status: DISCONTINUED | OUTPATIENT
Start: 2022-10-25 | End: 2022-10-30 | Stop reason: HOSPADM

## 2022-10-25 RX ORDER — CALCIUM CARBONATE 200(500)MG
2 TABLET,CHEWABLE ORAL 2 TIMES DAILY PRN
Status: DISCONTINUED | OUTPATIENT
Start: 2022-10-25 | End: 2022-10-30 | Stop reason: HOSPADM

## 2022-10-25 RX ORDER — ENOXAPARIN SODIUM 100 MG/ML
40 INJECTION SUBCUTANEOUS DAILY
Status: DISCONTINUED | OUTPATIENT
Start: 2022-10-25 | End: 2022-10-25

## 2022-10-25 RX ORDER — ACETAMINOPHEN 650 MG/1
650 SUPPOSITORY RECTAL EVERY 4 HOURS PRN
Status: DISCONTINUED | OUTPATIENT
Start: 2022-10-25 | End: 2022-10-30 | Stop reason: HOSPADM

## 2022-10-25 RX ORDER — SODIUM CHLORIDE 0.9 % (FLUSH) 0.9 %
10 SYRINGE (ML) INJECTION EVERY 12 HOURS SCHEDULED
Status: DISCONTINUED | OUTPATIENT
Start: 2022-10-25 | End: 2022-10-30 | Stop reason: HOSPADM

## 2022-10-25 RX ORDER — ONDANSETRON 4 MG/1
4 TABLET, FILM COATED ORAL EVERY 6 HOURS PRN
Status: DISCONTINUED | OUTPATIENT
Start: 2022-10-25 | End: 2022-10-30 | Stop reason: HOSPADM

## 2022-10-25 RX ORDER — INSULIN LISPRO 100 [IU]/ML
0-9 INJECTION, SOLUTION INTRAVENOUS; SUBCUTANEOUS
Status: DISCONTINUED | OUTPATIENT
Start: 2022-10-25 | End: 2022-10-30 | Stop reason: HOSPADM

## 2022-10-25 RX ORDER — HEPARIN SODIUM 1000 [USP'U]/ML
4000 INJECTION, SOLUTION INTRAVENOUS; SUBCUTANEOUS AS NEEDED
Status: DISCONTINUED | OUTPATIENT
Start: 2022-10-25 | End: 2022-10-25 | Stop reason: SDUPTHER

## 2022-10-25 RX ORDER — POLYETHYLENE GLYCOL 3350 17 G/17G
17 POWDER, FOR SOLUTION ORAL DAILY PRN
Status: DISCONTINUED | OUTPATIENT
Start: 2022-10-25 | End: 2022-10-30 | Stop reason: HOSPADM

## 2022-10-25 RX ORDER — ACETAMINOPHEN 160 MG/5ML
650 SOLUTION ORAL EVERY 4 HOURS PRN
Status: DISCONTINUED | OUTPATIENT
Start: 2022-10-25 | End: 2022-10-30 | Stop reason: HOSPADM

## 2022-10-25 RX ORDER — NICOTINE POLACRILEX 4 MG
15 LOZENGE BUCCAL
Status: DISCONTINUED | OUTPATIENT
Start: 2022-10-25 | End: 2022-10-30 | Stop reason: HOSPADM

## 2022-10-25 RX ORDER — HEPARIN SODIUM 1000 [USP'U]/ML
2000 INJECTION, SOLUTION INTRAVENOUS; SUBCUTANEOUS AS NEEDED
Status: DISCONTINUED | OUTPATIENT
Start: 2022-10-25 | End: 2022-10-25 | Stop reason: SDUPTHER

## 2022-10-25 RX ADMIN — METOPROLOL SUCCINATE 100 MG: 100 TABLET, FILM COATED, EXTENDED RELEASE ORAL at 20:38

## 2022-10-25 RX ADMIN — PRAVASTATIN SODIUM 40 MG: 40 TABLET ORAL at 20:37

## 2022-10-25 RX ADMIN — FUROSEMIDE 80 MG: 10 INJECTION, SOLUTION INTRAMUSCULAR; INTRAVENOUS at 16:24

## 2022-10-25 RX ADMIN — SENNOSIDES AND DOCUSATE SODIUM 2 TABLET: 50; 8.6 TABLET ORAL at 20:32

## 2022-10-25 RX ADMIN — EMPAGLIFLOZIN 10 MG: 10 TABLET, FILM COATED ORAL at 20:38

## 2022-10-25 RX ADMIN — ENOXAPARIN SODIUM 40 MG: 40 INJECTION SUBCUTANEOUS at 20:32

## 2022-10-25 RX ADMIN — Medication 10 ML: at 20:40

## 2022-10-25 RX ADMIN — SACUBITRIL AND VALSARTAN 1 TABLET: 24; 26 TABLET, FILM COATED ORAL at 20:38

## 2022-10-25 RX ADMIN — INSULIN DETEMIR 10 UNITS: 100 INJECTION, SOLUTION SUBCUTANEOUS at 20:39

## 2022-10-26 ENCOUNTER — APPOINTMENT (OUTPATIENT)
Dept: NUCLEAR MEDICINE | Facility: HOSPITAL | Age: 87
End: 2022-10-26

## 2022-10-26 ENCOUNTER — APPOINTMENT (OUTPATIENT)
Dept: CARDIOLOGY | Facility: HOSPITAL | Age: 87
End: 2022-10-26

## 2022-10-26 LAB
ANION GAP SERPL CALCULATED.3IONS-SCNC: 12 MMOL/L (ref 5–15)
APTT PPP: 38.1 SECONDS (ref 60–90)
BASOPHILS # BLD AUTO: 0.32 10*3/MM3 (ref 0–0.2)
BASOPHILS NFR BLD AUTO: 2.5 % (ref 0–1.5)
BH CV ECHO MEAS - AI P1/2T: 1144 MSEC
BH CV ECHO MEAS - AO MAX PG: 6.3 MMHG
BH CV ECHO MEAS - AO MEAN PG: 3.3 MMHG
BH CV ECHO MEAS - AO ROOT DIAM: 4.4 CM
BH CV ECHO MEAS - AO V2 MAX: 124.1 CM/SEC
BH CV ECHO MEAS - AO V2 VTI: 21.7 CM
BH CV ECHO MEAS - AVA(I,D): 2.1 CM2
BH CV ECHO MEAS - EDV(CUBED): 82.3 ML
BH CV ECHO MEAS - EDV(MOD-SP2): 105 ML
BH CV ECHO MEAS - EDV(MOD-SP4): 99.3 ML
BH CV ECHO MEAS - EF(MOD-BP): 47 %
BH CV ECHO MEAS - EF(MOD-SP2): 49.8 %
BH CV ECHO MEAS - EF(MOD-SP4): 42.5 %
BH CV ECHO MEAS - ESV(CUBED): 68.9 ML
BH CV ECHO MEAS - ESV(MOD-SP2): 52.7 ML
BH CV ECHO MEAS - ESV(MOD-SP4): 57.1 ML
BH CV ECHO MEAS - FS: 5.7 %
BH CV ECHO MEAS - IVS/LVPW: 0.93 CM
BH CV ECHO MEAS - IVSD: 1.3 CM
BH CV ECHO MEAS - LA DIMENSION: 5.8 CM
BH CV ECHO MEAS - LAT PEAK E' VEL: 10 CM/SEC
BH CV ECHO MEAS - LV MASS(C)D: 223.7 GRAMS
BH CV ECHO MEAS - LV MAX PG: 3 MMHG
BH CV ECHO MEAS - LV MEAN PG: 1 MMHG
BH CV ECHO MEAS - LV V1 MAX: 86.7 CM/SEC
BH CV ECHO MEAS - LV V1 VTI: 14.5 CM
BH CV ECHO MEAS - LVIDD: 4.4 CM
BH CV ECHO MEAS - LVIDS: 4.1 CM
BH CV ECHO MEAS - LVOT AREA: 3.1 CM2
BH CV ECHO MEAS - LVOT DIAM: 2 CM
BH CV ECHO MEAS - LVPWD: 1.4 CM
BH CV ECHO MEAS - MED PEAK E' VEL: 6.1 CM/SEC
BH CV ECHO MEAS - MV A MAX VEL: 48.1 CM/SEC
BH CV ECHO MEAS - MV DEC SLOPE: 339 CM/SEC2
BH CV ECHO MEAS - MV DEC TIME: 0.17 MSEC
BH CV ECHO MEAS - MV E MAX VEL: 79.8 CM/SEC
BH CV ECHO MEAS - MV E/A: 1.66
BH CV ECHO MEAS - MV MAX PG: 5.2 MMHG
BH CV ECHO MEAS - MV MEAN PG: 1.33 MMHG
BH CV ECHO MEAS - MV P1/2T: 91.6 MSEC
BH CV ECHO MEAS - MV V2 VTI: 40.9 CM
BH CV ECHO MEAS - MVA(P1/2T): 2.4 CM2
BH CV ECHO MEAS - MVA(VTI): 1.11 CM2
BH CV ECHO MEAS - PA ACC TIME: 0.17 SEC
BH CV ECHO MEAS - PA PR(ACCEL): 4.8 MMHG
BH CV ECHO MEAS - PA V2 MAX: 87.6 CM/SEC
BH CV ECHO MEAS - RAP SYSTOLE: 3 MMHG
BH CV ECHO MEAS - RV MAX PG: 4.2 MMHG
BH CV ECHO MEAS - RV V1 MAX: 102 CM/SEC
BH CV ECHO MEAS - RV V1 VTI: 19.9 CM
BH CV ECHO MEAS - RVSP: 36 MMHG
BH CV ECHO MEAS - SV(LVOT): 45.6 ML
BH CV ECHO MEAS - SV(MOD-SP2): 52.3 ML
BH CV ECHO MEAS - SV(MOD-SP4): 42.2 ML
BH CV ECHO MEAS - TAPSE (>1.6): 1.37 CM
BH CV ECHO MEAS - TR MAX PG: 33.3 MMHG
BH CV ECHO MEAS - TR MAX VEL: 287.4 CM/SEC
BH CV ECHO MEASUREMENTS AVERAGE E/E' RATIO: 9.91
BH CV XLRA - RV BASE: 5.9 CM
BH CV XLRA - RV LENGTH: 8.1 CM
BH CV XLRA - RV MID: 4 CM
BH CV XLRA - TDI S': 9.5 CM/SEC
BUN SERPL-MCNC: 31 MG/DL (ref 8–23)
BUN/CREAT SERPL: 22.5 (ref 7–25)
CALCIUM SPEC-SCNC: 8.9 MG/DL (ref 8.6–10.5)
CHLORIDE SERPL-SCNC: 106 MMOL/L (ref 98–107)
CHOLEST SERPL-MCNC: 137 MG/DL (ref 0–200)
CO2 SERPL-SCNC: 25 MMOL/L (ref 22–29)
CREAT SERPL-MCNC: 1.38 MG/DL (ref 0.76–1.27)
D DIMER PPP FEU-MCNC: 3.73 MCGFEU/ML (ref 0.01–0.5)
DEPRECATED RDW RBC AUTO: 60.4 FL (ref 37–54)
EGFRCR SERPLBLD CKD-EPI 2021: 50.1 ML/MIN/1.73
EOSINOPHIL # BLD AUTO: 0.08 10*3/MM3 (ref 0–0.4)
EOSINOPHIL NFR BLD AUTO: 0.6 % (ref 0.3–6.2)
ERYTHROCYTE [DISTWIDTH] IN BLOOD BY AUTOMATED COUNT: 17.7 % (ref 12.3–15.4)
GLUCOSE BLDC GLUCOMTR-MCNC: 121 MG/DL (ref 70–130)
GLUCOSE BLDC GLUCOMTR-MCNC: 125 MG/DL (ref 70–130)
GLUCOSE BLDC GLUCOMTR-MCNC: 145 MG/DL (ref 70–130)
GLUCOSE BLDC GLUCOMTR-MCNC: 72 MG/DL (ref 70–130)
GLUCOSE SERPL-MCNC: 81 MG/DL (ref 65–99)
HCT VFR BLD AUTO: 43.4 % (ref 37.5–51)
HDLC SERPL-MCNC: 73 MG/DL (ref 40–60)
HGB BLD-MCNC: 13.1 G/DL (ref 13–17.7)
IMM GRANULOCYTES # BLD AUTO: 0.35 10*3/MM3 (ref 0–0.05)
IMM GRANULOCYTES NFR BLD AUTO: 2.8 % (ref 0–0.5)
INR PPP: 1.23 (ref 0.84–1.13)
LDLC SERPL CALC-MCNC: 53 MG/DL (ref 0–100)
LDLC/HDLC SERPL: 0.75 {RATIO}
LEFT ATRIUM VOLUME INDEX: 43.5 ML/M2
LYMPHOCYTES # BLD AUTO: 1.58 10*3/MM3 (ref 0.7–3.1)
LYMPHOCYTES NFR BLD AUTO: 12.6 % (ref 19.6–45.3)
MAGNESIUM SERPL-MCNC: 2.9 MG/DL (ref 1.6–2.4)
MAXIMAL PREDICTED HEART RATE: 135 BPM
MCH RBC QN AUTO: 28.2 PG (ref 26.6–33)
MCHC RBC AUTO-ENTMCNC: 30.2 G/DL (ref 31.5–35.7)
MCV RBC AUTO: 93.3 FL (ref 79–97)
MONOCYTES # BLD AUTO: 1.18 10*3/MM3 (ref 0.1–0.9)
MONOCYTES NFR BLD AUTO: 9.4 % (ref 5–12)
NEUTROPHILS NFR BLD AUTO: 72.1 % (ref 42.7–76)
NEUTROPHILS NFR BLD AUTO: 9.06 10*3/MM3 (ref 1.7–7)
NRBC BLD AUTO-RTO: 0 /100 WBC (ref 0–0.2)
PLATELET # BLD AUTO: 149 10*3/MM3 (ref 140–450)
PMV BLD AUTO: 11.8 FL (ref 6–12)
POTASSIUM SERPL-SCNC: 4.2 MMOL/L (ref 3.5–5.2)
PROTHROMBIN TIME: 15.5 SECONDS (ref 11.4–14.4)
QT INTERVAL: 494 MS
QTC INTERVAL: 509 MS
RBC # BLD AUTO: 4.65 10*6/MM3 (ref 4.14–5.8)
SODIUM SERPL-SCNC: 143 MMOL/L (ref 136–145)
STRESS TARGET HR: 115 BPM
TRIGL SERPL-MCNC: 46 MG/DL (ref 0–150)
TROPONIN T SERPL-MCNC: 0.03 NG/ML (ref 0–0.03)
UFH PPP CHRO-ACNC: 0.2 IU/ML (ref 0.3–0.7)
UFH PPP CHRO-ACNC: 0.41 IU/ML (ref 0.3–0.7)
UFH PPP CHRO-ACNC: 0.57 IU/ML (ref 0.3–0.7)
UFH PPP CHRO-ACNC: >1.1 IU/ML (ref 0.3–0.7)
VLDLC SERPL-MCNC: 11 MG/DL (ref 5–40)
WBC NRBC COR # BLD: 12.57 10*3/MM3 (ref 3.4–10.8)

## 2022-10-26 PROCEDURE — 85025 COMPLETE CBC W/AUTO DIFF WBC: CPT | Performed by: FAMILY MEDICINE

## 2022-10-26 PROCEDURE — 85730 THROMBOPLASTIN TIME PARTIAL: CPT | Performed by: FAMILY MEDICINE

## 2022-10-26 PROCEDURE — 94799 UNLISTED PULMONARY SVC/PX: CPT

## 2022-10-26 PROCEDURE — 85520 HEPARIN ASSAY: CPT | Performed by: FAMILY MEDICINE

## 2022-10-26 PROCEDURE — 94660 CPAP INITIATION&MGMT: CPT

## 2022-10-26 PROCEDURE — 80061 LIPID PANEL: CPT | Performed by: NURSE PRACTITIONER

## 2022-10-26 PROCEDURE — 85610 PROTHROMBIN TIME: CPT | Performed by: FAMILY MEDICINE

## 2022-10-26 PROCEDURE — 85520 HEPARIN ASSAY: CPT

## 2022-10-26 PROCEDURE — 63710000001 INSULIN DETEMIR PER 5 UNITS: Performed by: NURSE PRACTITIONER

## 2022-10-26 PROCEDURE — 83735 ASSAY OF MAGNESIUM: CPT | Performed by: NURSE PRACTITIONER

## 2022-10-26 PROCEDURE — 84484 ASSAY OF TROPONIN QUANT: CPT | Performed by: NURSE PRACTITIONER

## 2022-10-26 PROCEDURE — 25010000002 HEPARIN (PORCINE) 25000-0.45 UT/250ML-% SOLUTION: Performed by: FAMILY MEDICINE

## 2022-10-26 PROCEDURE — 93010 ELECTROCARDIOGRAM REPORT: CPT | Performed by: INTERNAL MEDICINE

## 2022-10-26 PROCEDURE — 85379 FIBRIN DEGRADATION QUANT: CPT | Performed by: FAMILY MEDICINE

## 2022-10-26 PROCEDURE — 25010000002 AMIODARONE IN DEXTROSE 5% 360-4.14 MG/200ML-% SOLUTION: Performed by: PHYSICIAN ASSISTANT

## 2022-10-26 PROCEDURE — 25010000002 FUROSEMIDE PER 20 MG: Performed by: INTERNAL MEDICINE

## 2022-10-26 PROCEDURE — 93970 EXTREMITY STUDY: CPT

## 2022-10-26 PROCEDURE — 93005 ELECTROCARDIOGRAM TRACING: CPT | Performed by: NURSE PRACTITIONER

## 2022-10-26 PROCEDURE — 80048 BASIC METABOLIC PNL TOTAL CA: CPT | Performed by: NURSE PRACTITIONER

## 2022-10-26 PROCEDURE — 93306 TTE W/DOPPLER COMPLETE: CPT

## 2022-10-26 PROCEDURE — 82962 GLUCOSE BLOOD TEST: CPT

## 2022-10-26 PROCEDURE — 99233 SBSQ HOSP IP/OBS HIGH 50: CPT | Performed by: INTERNAL MEDICINE

## 2022-10-26 RX ORDER — FUROSEMIDE 10 MG/ML
80 INJECTION INTRAMUSCULAR; INTRAVENOUS ONCE
Status: COMPLETED | OUTPATIENT
Start: 2022-10-26 | End: 2022-10-26

## 2022-10-26 RX ORDER — METOPROLOL SUCCINATE 50 MG/1
50 TABLET, EXTENDED RELEASE ORAL DAILY
Status: DISCONTINUED | OUTPATIENT
Start: 2022-10-27 | End: 2022-10-27

## 2022-10-26 RX ADMIN — HEPARIN SODIUM 17 UNITS/KG/HR: 10000 INJECTION, SOLUTION INTRAVENOUS at 03:12

## 2022-10-26 RX ADMIN — SACUBITRIL AND VALSARTAN 1 TABLET: 24; 26 TABLET, FILM COATED ORAL at 20:56

## 2022-10-26 RX ADMIN — PRAVASTATIN SODIUM 40 MG: 40 TABLET ORAL at 20:56

## 2022-10-26 RX ADMIN — SENNOSIDES AND DOCUSATE SODIUM 2 TABLET: 50; 8.6 TABLET ORAL at 20:56

## 2022-10-26 RX ADMIN — EMPAGLIFLOZIN 10 MG: 10 TABLET, FILM COATED ORAL at 08:52

## 2022-10-26 RX ADMIN — AMIODARONE HYDROCHLORIDE 1 MG/MIN: 1.8 INJECTION, SOLUTION INTRAVENOUS at 16:46

## 2022-10-26 RX ADMIN — AMIODARONE HYDROCHLORIDE 1 MG/MIN: 1.8 INJECTION, SOLUTION INTRAVENOUS at 21:40

## 2022-10-26 RX ADMIN — FUROSEMIDE 80 MG: 10 INJECTION, SOLUTION INTRAMUSCULAR; INTRAVENOUS at 16:46

## 2022-10-26 RX ADMIN — SENNOSIDES AND DOCUSATE SODIUM 2 TABLET: 50; 8.6 TABLET ORAL at 08:52

## 2022-10-26 RX ADMIN — AMIODARONE HYDROCHLORIDE 1 MG/MIN: 1.8 INJECTION, SOLUTION INTRAVENOUS at 11:37

## 2022-10-26 RX ADMIN — Medication 10 ML: at 08:52

## 2022-10-26 RX ADMIN — HEPARIN SODIUM 17 UNITS/KG/HR: 10000 INJECTION, SOLUTION INTRAVENOUS at 19:12

## 2022-10-26 RX ADMIN — SACUBITRIL AND VALSARTAN 1 TABLET: 24; 26 TABLET, FILM COATED ORAL at 08:52

## 2022-10-26 RX ADMIN — INSULIN DETEMIR 10 UNITS: 100 INJECTION, SOLUTION SUBCUTANEOUS at 20:56

## 2022-10-26 RX ADMIN — Medication 10 ML: at 20:56

## 2022-10-27 LAB
ANION GAP SERPL CALCULATED.3IONS-SCNC: 11 MMOL/L (ref 5–15)
BUN SERPL-MCNC: 32 MG/DL (ref 8–23)
BUN/CREAT SERPL: 23.9 (ref 7–25)
CALCIUM SPEC-SCNC: 8.9 MG/DL (ref 8.6–10.5)
CHLORIDE SERPL-SCNC: 101 MMOL/L (ref 98–107)
CO2 SERPL-SCNC: 28 MMOL/L (ref 22–29)
CREAT SERPL-MCNC: 1.34 MG/DL (ref 0.76–1.27)
DEPRECATED RDW RBC AUTO: 59.7 FL (ref 37–54)
EGFRCR SERPLBLD CKD-EPI 2021: 51.9 ML/MIN/1.73
ERYTHROCYTE [DISTWIDTH] IN BLOOD BY AUTOMATED COUNT: 17.6 % (ref 12.3–15.4)
GLUCOSE BLDC GLUCOMTR-MCNC: 145 MG/DL (ref 70–130)
GLUCOSE BLDC GLUCOMTR-MCNC: 91 MG/DL (ref 70–130)
GLUCOSE BLDC GLUCOMTR-MCNC: 95 MG/DL (ref 70–130)
GLUCOSE BLDC GLUCOMTR-MCNC: 99 MG/DL (ref 70–130)
GLUCOSE SERPL-MCNC: 114 MG/DL (ref 65–99)
HCT VFR BLD AUTO: 43.4 % (ref 37.5–51)
HGB BLD-MCNC: 13.3 G/DL (ref 13–17.7)
MCH RBC QN AUTO: 28.5 PG (ref 26.6–33)
MCHC RBC AUTO-ENTMCNC: 30.6 G/DL (ref 31.5–35.7)
MCV RBC AUTO: 92.9 FL (ref 79–97)
PLATELET # BLD AUTO: 146 10*3/MM3 (ref 140–450)
PMV BLD AUTO: 11.5 FL (ref 6–12)
POTASSIUM SERPL-SCNC: 3.6 MMOL/L (ref 3.5–5.2)
RBC # BLD AUTO: 4.67 10*6/MM3 (ref 4.14–5.8)
SODIUM SERPL-SCNC: 140 MMOL/L (ref 136–145)
UFH PPP CHRO-ACNC: 0.1 IU/ML (ref 0.3–0.7)
WBC NRBC COR # BLD: 10.75 10*3/MM3 (ref 3.4–10.8)

## 2022-10-27 PROCEDURE — 99233 SBSQ HOSP IP/OBS HIGH 50: CPT | Performed by: INTERNAL MEDICINE

## 2022-10-27 PROCEDURE — 80048 BASIC METABOLIC PNL TOTAL CA: CPT | Performed by: INTERNAL MEDICINE

## 2022-10-27 PROCEDURE — 85520 HEPARIN ASSAY: CPT

## 2022-10-27 PROCEDURE — 93005 ELECTROCARDIOGRAM TRACING: CPT | Performed by: PHYSICIAN ASSISTANT

## 2022-10-27 PROCEDURE — 25010000002 HEPARIN (PORCINE) PER 1000 UNITS

## 2022-10-27 PROCEDURE — 97165 OT EVAL LOW COMPLEX 30 MIN: CPT

## 2022-10-27 PROCEDURE — 93005 ELECTROCARDIOGRAM TRACING: CPT | Performed by: INTERNAL MEDICINE

## 2022-10-27 PROCEDURE — 97535 SELF CARE MNGMENT TRAINING: CPT

## 2022-10-27 PROCEDURE — G0108 DIAB MANAGE TRN  PER INDIV: HCPCS

## 2022-10-27 PROCEDURE — 63710000001 INSULIN LISPRO (HUMAN) PER 5 UNITS: Performed by: NURSE PRACTITIONER

## 2022-10-27 PROCEDURE — 94799 UNLISTED PULMONARY SVC/PX: CPT

## 2022-10-27 PROCEDURE — 63710000001 INSULIN DETEMIR PER 5 UNITS: Performed by: NURSE PRACTITIONER

## 2022-10-27 PROCEDURE — 97161 PT EVAL LOW COMPLEX 20 MIN: CPT

## 2022-10-27 PROCEDURE — 82962 GLUCOSE BLOOD TEST: CPT

## 2022-10-27 PROCEDURE — 25010000002 AMIODARONE IN DEXTROSE 5% 360-4.14 MG/200ML-% SOLUTION: Performed by: PHYSICIAN ASSISTANT

## 2022-10-27 PROCEDURE — 85027 COMPLETE CBC AUTOMATED: CPT | Performed by: INTERNAL MEDICINE

## 2022-10-27 PROCEDURE — 94660 CPAP INITIATION&MGMT: CPT

## 2022-10-27 RX ORDER — AMIODARONE HYDROCHLORIDE 200 MG/1
200 TABLET ORAL EVERY 12 HOURS SCHEDULED
Status: DISCONTINUED | OUTPATIENT
Start: 2022-10-27 | End: 2022-10-30 | Stop reason: HOSPADM

## 2022-10-27 RX ORDER — METOPROLOL SUCCINATE 25 MG/1
25 TABLET, EXTENDED RELEASE ORAL DAILY
Status: DISCONTINUED | OUTPATIENT
Start: 2022-10-28 | End: 2022-10-28

## 2022-10-27 RX ORDER — HEPARIN SODIUM 1000 [USP'U]/ML
4000 INJECTION, SOLUTION INTRAVENOUS; SUBCUTANEOUS ONCE
Status: COMPLETED | OUTPATIENT
Start: 2022-10-27 | End: 2022-10-27

## 2022-10-27 RX ORDER — BUMETANIDE 1 MG/1
1 TABLET ORAL DAILY
Status: DISCONTINUED | OUTPATIENT
Start: 2022-10-27 | End: 2022-10-30 | Stop reason: HOSPADM

## 2022-10-27 RX ADMIN — AMIODARONE HYDROCHLORIDE 1 MG/MIN: 1.8 INJECTION, SOLUTION INTRAVENOUS at 04:11

## 2022-10-27 RX ADMIN — METOPROLOL SUCCINATE 50 MG: 50 TABLET, EXTENDED RELEASE ORAL at 08:26

## 2022-10-27 RX ADMIN — EMPAGLIFLOZIN 10 MG: 10 TABLET, FILM COATED ORAL at 08:26

## 2022-10-27 RX ADMIN — Medication 10 ML: at 08:26

## 2022-10-27 RX ADMIN — PRAVASTATIN SODIUM 40 MG: 40 TABLET ORAL at 20:48

## 2022-10-27 RX ADMIN — SACUBITRIL AND VALSARTAN 1 TABLET: 24; 26 TABLET, FILM COATED ORAL at 20:48

## 2022-10-27 RX ADMIN — SODIUM CHLORIDE 250 ML: 9 INJECTION, SOLUTION INTRAVENOUS at 22:31

## 2022-10-27 RX ADMIN — HEPARIN SODIUM 4000 UNITS: 1000 INJECTION, SOLUTION INTRAVENOUS; SUBCUTANEOUS at 11:40

## 2022-10-27 RX ADMIN — APIXABAN 10 MG: 5 TABLET, FILM COATED ORAL at 20:48

## 2022-10-27 RX ADMIN — AMIODARONE HYDROCHLORIDE 200 MG: 200 TABLET ORAL at 10:20

## 2022-10-27 RX ADMIN — SACUBITRIL AND VALSARTAN 1 TABLET: 24; 26 TABLET, FILM COATED ORAL at 08:26

## 2022-10-27 RX ADMIN — INSULIN DETEMIR 10 UNITS: 100 INJECTION, SOLUTION SUBCUTANEOUS at 20:48

## 2022-10-27 RX ADMIN — Medication 10 ML: at 20:48

## 2022-10-27 RX ADMIN — BUMETANIDE 1 MG: 1 TABLET ORAL at 10:20

## 2022-10-28 ENCOUNTER — APPOINTMENT (OUTPATIENT)
Dept: GENERAL RADIOLOGY | Facility: HOSPITAL | Age: 87
End: 2022-10-28

## 2022-10-28 ENCOUNTER — APPOINTMENT (OUTPATIENT)
Dept: ULTRASOUND IMAGING | Facility: HOSPITAL | Age: 87
End: 2022-10-28

## 2022-10-28 LAB
ALBUMIN SERPL-MCNC: 3.6 G/DL (ref 3.5–5.2)
ALBUMIN/GLOB SERPL: 1.6 G/DL
ALP SERPL-CCNC: 197 U/L (ref 39–117)
ALT SERPL W P-5'-P-CCNC: 16 U/L (ref 1–41)
ANION GAP SERPL CALCULATED.3IONS-SCNC: 11 MMOL/L (ref 5–15)
AST SERPL-CCNC: 23 U/L (ref 1–40)
BH CV LOW VAS RIGHT LESSER SAPH VESSEL: 1
BH CV LOW VAS RIGHT POSTERIOR TIBIAL VESSEL: 1
BH CV LOWER VASCULAR LEFT COMMON FEMORAL AUGMENT: NORMAL
BH CV LOWER VASCULAR LEFT COMMON FEMORAL COMPETENT: NORMAL
BH CV LOWER VASCULAR LEFT COMMON FEMORAL COMPRESS: NORMAL
BH CV LOWER VASCULAR LEFT COMMON FEMORAL PHASIC: NORMAL
BH CV LOWER VASCULAR LEFT COMMON FEMORAL SPONT: NORMAL
BH CV LOWER VASCULAR LEFT DISTAL FEMORAL COMPRESS: NORMAL
BH CV LOWER VASCULAR LEFT GASTRONEMIUS COMPRESS: NORMAL
BH CV LOWER VASCULAR LEFT GREATER SAPH AK COMPRESS: NORMAL
BH CV LOWER VASCULAR LEFT GREATER SAPH BK COMPRESS: NORMAL
BH CV LOWER VASCULAR LEFT LESSER SAPH COMPRESS: NORMAL
BH CV LOWER VASCULAR LEFT MID FEMORAL AUGMENT: NORMAL
BH CV LOWER VASCULAR LEFT MID FEMORAL COMPETENT: NORMAL
BH CV LOWER VASCULAR LEFT MID FEMORAL COMPRESS: NORMAL
BH CV LOWER VASCULAR LEFT MID FEMORAL PHASIC: NORMAL
BH CV LOWER VASCULAR LEFT MID FEMORAL SPONT: NORMAL
BH CV LOWER VASCULAR LEFT PERONEAL COMPRESS: NORMAL
BH CV LOWER VASCULAR LEFT POPLITEAL AUGMENT: NORMAL
BH CV LOWER VASCULAR LEFT POPLITEAL COMPETENT: NORMAL
BH CV LOWER VASCULAR LEFT POPLITEAL COMPRESS: NORMAL
BH CV LOWER VASCULAR LEFT POPLITEAL PHASIC: NORMAL
BH CV LOWER VASCULAR LEFT POPLITEAL SPONT: NORMAL
BH CV LOWER VASCULAR LEFT PROFUNDA FEMORAL COMPRESS: NORMAL
BH CV LOWER VASCULAR LEFT PROXIMAL FEMORAL COMPRESS: NORMAL
BH CV LOWER VASCULAR LEFT SAPHENOFEMORAL JUNCTION COMPRESS: NORMAL
BH CV LOWER VASCULAR RIGHT COMMON FEMORAL AUGMENT: NORMAL
BH CV LOWER VASCULAR RIGHT COMMON FEMORAL COMPETENT: NORMAL
BH CV LOWER VASCULAR RIGHT COMMON FEMORAL COMPRESS: NORMAL
BH CV LOWER VASCULAR RIGHT COMMON FEMORAL PHASIC: NORMAL
BH CV LOWER VASCULAR RIGHT COMMON FEMORAL SPONT: NORMAL
BH CV LOWER VASCULAR RIGHT DISTAL FEMORAL COMPRESS: NORMAL
BH CV LOWER VASCULAR RIGHT GASTRONEMIUS COMPRESS: NORMAL
BH CV LOWER VASCULAR RIGHT GREATER SAPH AK COMPRESS: NORMAL
BH CV LOWER VASCULAR RIGHT GREATER SAPH BK COMPRESS: NORMAL
BH CV LOWER VASCULAR RIGHT LESSER SAPH COMPRESS: NORMAL
BH CV LOWER VASCULAR RIGHT LESSER SAPH THROMBUS: NORMAL
BH CV LOWER VASCULAR RIGHT MID FEMORAL AUGMENT: NORMAL
BH CV LOWER VASCULAR RIGHT MID FEMORAL COMPETENT: NORMAL
BH CV LOWER VASCULAR RIGHT MID FEMORAL COMPRESS: NORMAL
BH CV LOWER VASCULAR RIGHT MID FEMORAL PHASIC: NORMAL
BH CV LOWER VASCULAR RIGHT MID FEMORAL SPONT: NORMAL
BH CV LOWER VASCULAR RIGHT PERONEAL COMPRESS: NORMAL
BH CV LOWER VASCULAR RIGHT POPLITEAL AUGMENT: NORMAL
BH CV LOWER VASCULAR RIGHT POPLITEAL COMPETENT: NORMAL
BH CV LOWER VASCULAR RIGHT POPLITEAL COMPRESS: NORMAL
BH CV LOWER VASCULAR RIGHT POPLITEAL PHASIC: NORMAL
BH CV LOWER VASCULAR RIGHT POPLITEAL SPONT: NORMAL
BH CV LOWER VASCULAR RIGHT POSTERIOR TIBIAL COMPRESS: NORMAL
BH CV LOWER VASCULAR RIGHT POSTERIOR TIBIAL THROMBUS: NORMAL
BH CV LOWER VASCULAR RIGHT PROFUNDA FEMORAL COMPRESS: NORMAL
BH CV LOWER VASCULAR RIGHT PROXIMAL FEMORAL COMPRESS: NORMAL
BH CV LOWER VASCULAR RIGHT SAPHENOFEMORAL JUNCTION COMPRESS: NORMAL
BH CV VAS PRELIMINARY FINDINGS SCRIPTING: 1
BILIRUB SERPL-MCNC: 0.5 MG/DL (ref 0–1.2)
BUN SERPL-MCNC: 32 MG/DL (ref 8–23)
BUN/CREAT SERPL: 23.4 (ref 7–25)
CALCIUM SPEC-SCNC: 8.5 MG/DL (ref 8.6–10.5)
CHLORIDE SERPL-SCNC: 106 MMOL/L (ref 98–107)
CO2 SERPL-SCNC: 26 MMOL/L (ref 22–29)
CREAT SERPL-MCNC: 1.37 MG/DL (ref 0.76–1.27)
DEPRECATED RDW RBC AUTO: 59.4 FL (ref 37–54)
EGFRCR SERPLBLD CKD-EPI 2021: 50.6 ML/MIN/1.73
ERYTHROCYTE [DISTWIDTH] IN BLOOD BY AUTOMATED COUNT: 17.8 % (ref 12.3–15.4)
GLOBULIN UR ELPH-MCNC: 2.2 GM/DL
GLUCOSE BLDC GLUCOMTR-MCNC: 119 MG/DL (ref 70–130)
GLUCOSE BLDC GLUCOMTR-MCNC: 158 MG/DL (ref 70–130)
GLUCOSE BLDC GLUCOMTR-MCNC: 232 MG/DL (ref 70–130)
GLUCOSE BLDC GLUCOMTR-MCNC: 63 MG/DL (ref 70–130)
GLUCOSE SERPL-MCNC: 64 MG/DL (ref 65–99)
HCT VFR BLD AUTO: 40.7 % (ref 37.5–51)
HGB BLD-MCNC: 12.5 G/DL (ref 13–17.7)
MAXIMAL PREDICTED HEART RATE: 135 BPM
MCH RBC QN AUTO: 28.1 PG (ref 26.6–33)
MCHC RBC AUTO-ENTMCNC: 30.7 G/DL (ref 31.5–35.7)
MCV RBC AUTO: 91.5 FL (ref 79–97)
PLATELET # BLD AUTO: 157 10*3/MM3 (ref 140–450)
PMV BLD AUTO: 11.1 FL (ref 6–12)
POTASSIUM SERPL-SCNC: 3.4 MMOL/L (ref 3.5–5.2)
POTASSIUM SERPL-SCNC: 4.6 MMOL/L (ref 3.5–5.2)
PROT SERPL-MCNC: 5.8 G/DL (ref 6–8.5)
RBC # BLD AUTO: 4.45 10*6/MM3 (ref 4.14–5.8)
SODIUM SERPL-SCNC: 143 MMOL/L (ref 136–145)
STRESS TARGET HR: 115 BPM
WBC NRBC COR # BLD: 10.94 10*3/MM3 (ref 3.4–10.8)

## 2022-10-28 PROCEDURE — 76705 ECHO EXAM OF ABDOMEN: CPT

## 2022-10-28 PROCEDURE — 93005 ELECTROCARDIOGRAM TRACING: CPT | Performed by: INTERNAL MEDICINE

## 2022-10-28 PROCEDURE — 85027 COMPLETE CBC AUTOMATED: CPT | Performed by: INTERNAL MEDICINE

## 2022-10-28 PROCEDURE — 71046 X-RAY EXAM CHEST 2 VIEWS: CPT

## 2022-10-28 PROCEDURE — 63710000001 INSULIN DETEMIR PER 5 UNITS: Performed by: NURSE PRACTITIONER

## 2022-10-28 PROCEDURE — 94799 UNLISTED PULMONARY SVC/PX: CPT

## 2022-10-28 PROCEDURE — 94660 CPAP INITIATION&MGMT: CPT

## 2022-10-28 PROCEDURE — 99232 SBSQ HOSP IP/OBS MODERATE 35: CPT | Performed by: INTERNAL MEDICINE

## 2022-10-28 PROCEDURE — 82962 GLUCOSE BLOOD TEST: CPT

## 2022-10-28 PROCEDURE — 63710000001 DIPHENHYDRAMINE PER 50 MG: Performed by: NURSE PRACTITIONER

## 2022-10-28 PROCEDURE — 84132 ASSAY OF SERUM POTASSIUM: CPT | Performed by: INTERNAL MEDICINE

## 2022-10-28 PROCEDURE — 80053 COMPREHEN METABOLIC PANEL: CPT | Performed by: INTERNAL MEDICINE

## 2022-10-28 PROCEDURE — 63710000001 INSULIN LISPRO (HUMAN) PER 5 UNITS: Performed by: NURSE PRACTITIONER

## 2022-10-28 RX ORDER — POTASSIUM CHLORIDE 750 MG/1
40 CAPSULE, EXTENDED RELEASE ORAL AS NEEDED
Status: DISCONTINUED | OUTPATIENT
Start: 2022-10-28 | End: 2022-10-30 | Stop reason: HOSPADM

## 2022-10-28 RX ORDER — METOPROLOL SUCCINATE 25 MG/1
12.5 TABLET, EXTENDED RELEASE ORAL DAILY
Status: DISCONTINUED | OUTPATIENT
Start: 2022-10-29 | End: 2022-10-30 | Stop reason: HOSPADM

## 2022-10-28 RX ORDER — MAGNESIUM SULFATE HEPTAHYDRATE 40 MG/ML
2 INJECTION, SOLUTION INTRAVENOUS AS NEEDED
Status: DISCONTINUED | OUTPATIENT
Start: 2022-10-28 | End: 2022-10-30 | Stop reason: HOSPADM

## 2022-10-28 RX ORDER — MAGNESIUM SULFATE HEPTAHYDRATE 40 MG/ML
4 INJECTION, SOLUTION INTRAVENOUS AS NEEDED
Status: DISCONTINUED | OUTPATIENT
Start: 2022-10-28 | End: 2022-10-30 | Stop reason: HOSPADM

## 2022-10-28 RX ORDER — DIPHENHYDRAMINE HCL 25 MG
25 CAPSULE ORAL ONCE
Status: COMPLETED | OUTPATIENT
Start: 2022-10-28 | End: 2022-10-28

## 2022-10-28 RX ORDER — POTASSIUM CHLORIDE 1.5 G/1.77G
40 POWDER, FOR SOLUTION ORAL AS NEEDED
Status: DISCONTINUED | OUTPATIENT
Start: 2022-10-28 | End: 2022-10-30 | Stop reason: HOSPADM

## 2022-10-28 RX ADMIN — SACUBITRIL AND VALSARTAN 1 TABLET: 24; 26 TABLET, FILM COATED ORAL at 10:07

## 2022-10-28 RX ADMIN — INSULIN DETEMIR 10 UNITS: 100 INJECTION, SOLUTION SUBCUTANEOUS at 20:53

## 2022-10-28 RX ADMIN — APIXABAN 10 MG: 5 TABLET, FILM COATED ORAL at 10:07

## 2022-10-28 RX ADMIN — Medication 10 ML: at 10:08

## 2022-10-28 RX ADMIN — DIPHENHYDRAMINE HYDROCHLORIDE 25 MG: 25 CAPSULE ORAL at 05:05

## 2022-10-28 RX ADMIN — BUMETANIDE 1 MG: 1 TABLET ORAL at 10:07

## 2022-10-28 RX ADMIN — EMPAGLIFLOZIN 10 MG: 10 TABLET, FILM COATED ORAL at 10:07

## 2022-10-28 RX ADMIN — POTASSIUM CHLORIDE 40 MEQ: 750 CAPSULE, EXTENDED RELEASE ORAL at 17:49

## 2022-10-28 RX ADMIN — Medication 10 ML: at 20:38

## 2022-10-28 RX ADMIN — APIXABAN 10 MG: 5 TABLET, FILM COATED ORAL at 20:38

## 2022-10-28 RX ADMIN — PRAVASTATIN SODIUM 40 MG: 40 TABLET ORAL at 20:37

## 2022-10-28 RX ADMIN — POTASSIUM CHLORIDE 40 MEQ: 750 CAPSULE, EXTENDED RELEASE ORAL at 12:28

## 2022-10-28 RX ADMIN — INSULIN LISPRO 4 UNITS: 100 INJECTION, SOLUTION INTRAVENOUS; SUBCUTANEOUS at 17:49

## 2022-10-28 RX ADMIN — SACUBITRIL AND VALSARTAN 1 TABLET: 24; 26 TABLET, FILM COATED ORAL at 20:37

## 2022-10-29 LAB
ALBUMIN SERPL-MCNC: 3.8 G/DL (ref 3.5–5.2)
ANION GAP SERPL CALCULATED.3IONS-SCNC: 13 MMOL/L (ref 5–15)
BASOPHILS # BLD AUTO: 0.23 10*3/MM3 (ref 0–0.2)
BASOPHILS NFR BLD AUTO: 2.7 % (ref 0–1.5)
BUN SERPL-MCNC: 30 MG/DL (ref 8–23)
BUN/CREAT SERPL: 24 (ref 7–25)
CALCIUM SPEC-SCNC: 8.7 MG/DL (ref 8.6–10.5)
CHLORIDE SERPL-SCNC: 107 MMOL/L (ref 98–107)
CO2 SERPL-SCNC: 22 MMOL/L (ref 22–29)
CREAT SERPL-MCNC: 1.25 MG/DL (ref 0.76–1.27)
DEPRECATED RDW RBC AUTO: 60.1 FL (ref 37–54)
EGFRCR SERPLBLD CKD-EPI 2021: 56.4 ML/MIN/1.73
EOSINOPHIL # BLD AUTO: 0.28 10*3/MM3 (ref 0–0.4)
EOSINOPHIL NFR BLD AUTO: 3.3 % (ref 0.3–6.2)
ERYTHROCYTE [DISTWIDTH] IN BLOOD BY AUTOMATED COUNT: 17.6 % (ref 12.3–15.4)
GLUCOSE BLDC GLUCOMTR-MCNC: 134 MG/DL (ref 70–130)
GLUCOSE BLDC GLUCOMTR-MCNC: 141 MG/DL (ref 70–130)
GLUCOSE BLDC GLUCOMTR-MCNC: 208 MG/DL (ref 70–130)
GLUCOSE BLDC GLUCOMTR-MCNC: 75 MG/DL (ref 70–130)
GLUCOSE SERPL-MCNC: 95 MG/DL (ref 65–99)
HCT VFR BLD AUTO: 41.6 % (ref 37.5–51)
HGB BLD-MCNC: 12.8 G/DL (ref 13–17.7)
IMM GRANULOCYTES # BLD AUTO: 0.2 10*3/MM3 (ref 0–0.05)
IMM GRANULOCYTES NFR BLD AUTO: 2.4 % (ref 0–0.5)
LYMPHOCYTES # BLD AUTO: 0.95 10*3/MM3 (ref 0.7–3.1)
LYMPHOCYTES NFR BLD AUTO: 11.3 % (ref 19.6–45.3)
MAGNESIUM SERPL-MCNC: 2.3 MG/DL (ref 1.6–2.4)
MCH RBC QN AUTO: 28.5 PG (ref 26.6–33)
MCHC RBC AUTO-ENTMCNC: 30.8 G/DL (ref 31.5–35.7)
MCV RBC AUTO: 92.7 FL (ref 79–97)
MONOCYTES # BLD AUTO: 0.79 10*3/MM3 (ref 0.1–0.9)
MONOCYTES NFR BLD AUTO: 9.4 % (ref 5–12)
NEUTROPHILS NFR BLD AUTO: 5.95 10*3/MM3 (ref 1.7–7)
NEUTROPHILS NFR BLD AUTO: 70.9 % (ref 42.7–76)
NRBC BLD AUTO-RTO: 0 /100 WBC (ref 0–0.2)
PHOSPHATE SERPL-MCNC: 4 MG/DL (ref 2.5–4.5)
PLATELET # BLD AUTO: 118 10*3/MM3 (ref 140–450)
PMV BLD AUTO: 11.8 FL (ref 6–12)
POTASSIUM SERPL-SCNC: 4.1 MMOL/L (ref 3.5–5.2)
RBC # BLD AUTO: 4.49 10*6/MM3 (ref 4.14–5.8)
SODIUM SERPL-SCNC: 142 MMOL/L (ref 136–145)
WBC NRBC COR # BLD: 8.4 10*3/MM3 (ref 3.4–10.8)

## 2022-10-29 PROCEDURE — 82962 GLUCOSE BLOOD TEST: CPT

## 2022-10-29 PROCEDURE — 94660 CPAP INITIATION&MGMT: CPT

## 2022-10-29 PROCEDURE — 93010 ELECTROCARDIOGRAM REPORT: CPT | Performed by: INTERNAL MEDICINE

## 2022-10-29 PROCEDURE — 63710000001 INSULIN DETEMIR PER 5 UNITS: Performed by: NURSE PRACTITIONER

## 2022-10-29 PROCEDURE — 94799 UNLISTED PULMONARY SVC/PX: CPT

## 2022-10-29 PROCEDURE — 85025 COMPLETE CBC W/AUTO DIFF WBC: CPT | Performed by: INTERNAL MEDICINE

## 2022-10-29 PROCEDURE — 99232 SBSQ HOSP IP/OBS MODERATE 35: CPT | Performed by: INTERNAL MEDICINE

## 2022-10-29 PROCEDURE — 80069 RENAL FUNCTION PANEL: CPT | Performed by: INTERNAL MEDICINE

## 2022-10-29 PROCEDURE — 83735 ASSAY OF MAGNESIUM: CPT | Performed by: INTERNAL MEDICINE

## 2022-10-29 RX ADMIN — AMIODARONE HYDROCHLORIDE 200 MG: 200 TABLET ORAL at 23:26

## 2022-10-29 RX ADMIN — BUMETANIDE 1 MG: 1 TABLET ORAL at 09:11

## 2022-10-29 RX ADMIN — PRAVASTATIN SODIUM 40 MG: 40 TABLET ORAL at 21:38

## 2022-10-29 RX ADMIN — SACUBITRIL AND VALSARTAN 1 TABLET: 24; 26 TABLET, FILM COATED ORAL at 09:11

## 2022-10-29 RX ADMIN — APIXABAN 10 MG: 5 TABLET, FILM COATED ORAL at 21:37

## 2022-10-29 RX ADMIN — Medication 10 ML: at 21:38

## 2022-10-29 RX ADMIN — EMPAGLIFLOZIN 10 MG: 10 TABLET, FILM COATED ORAL at 09:11

## 2022-10-29 RX ADMIN — Medication 10 ML: at 09:12

## 2022-10-29 RX ADMIN — APIXABAN 10 MG: 5 TABLET, FILM COATED ORAL at 09:11

## 2022-10-29 RX ADMIN — AMIODARONE HYDROCHLORIDE 200 MG: 200 TABLET ORAL at 11:51

## 2022-10-29 RX ADMIN — SENNOSIDES AND DOCUSATE SODIUM 2 TABLET: 50; 8.6 TABLET ORAL at 11:51

## 2022-10-29 RX ADMIN — SACUBITRIL AND VALSARTAN 1 TABLET: 24; 26 TABLET, FILM COATED ORAL at 21:37

## 2022-10-29 RX ADMIN — INSULIN DETEMIR 10 UNITS: 100 INJECTION, SOLUTION SUBCUTANEOUS at 21:42

## 2022-10-29 RX ADMIN — METOPROLOL SUCCINATE 12.5 MG: 25 TABLET, FILM COATED, EXTENDED RELEASE ORAL at 11:51

## 2022-10-30 ENCOUNTER — READMISSION MANAGEMENT (OUTPATIENT)
Dept: CALL CENTER | Facility: HOSPITAL | Age: 87
End: 2022-10-30

## 2022-10-30 VITALS
RESPIRATION RATE: 18 BRPM | TEMPERATURE: 96.8 F | WEIGHT: 191.4 LBS | DIASTOLIC BLOOD PRESSURE: 99 MMHG | HEIGHT: 66 IN | BODY MASS INDEX: 30.76 KG/M2 | SYSTOLIC BLOOD PRESSURE: 136 MMHG | HEART RATE: 47 BPM | OXYGEN SATURATION: 95 %

## 2022-10-30 LAB
ALBUMIN SERPL-MCNC: 3.8 G/DL (ref 3.5–5.2)
ANION GAP SERPL CALCULATED.3IONS-SCNC: 9 MMOL/L (ref 5–15)
BASOPHILS # BLD AUTO: 0.2 10*3/MM3 (ref 0–0.2)
BASOPHILS NFR BLD AUTO: 2.6 % (ref 0–1.5)
BUN SERPL-MCNC: 30 MG/DL (ref 8–23)
BUN/CREAT SERPL: 25.6 (ref 7–25)
CALCIUM SPEC-SCNC: 8.7 MG/DL (ref 8.6–10.5)
CHLORIDE SERPL-SCNC: 106 MMOL/L (ref 98–107)
CO2 SERPL-SCNC: 31 MMOL/L (ref 22–29)
CREAT SERPL-MCNC: 1.17 MG/DL (ref 0.76–1.27)
DEPRECATED RDW RBC AUTO: 60.9 FL (ref 37–54)
EGFRCR SERPLBLD CKD-EPI 2021: 61.1 ML/MIN/1.73
EOSINOPHIL # BLD AUTO: 0.22 10*3/MM3 (ref 0–0.4)
EOSINOPHIL NFR BLD AUTO: 2.8 % (ref 0.3–6.2)
ERYTHROCYTE [DISTWIDTH] IN BLOOD BY AUTOMATED COUNT: 17.6 % (ref 12.3–15.4)
GLUCOSE BLDC GLUCOMTR-MCNC: 111 MG/DL (ref 70–130)
GLUCOSE BLDC GLUCOMTR-MCNC: 117 MG/DL (ref 70–130)
GLUCOSE SERPL-MCNC: 162 MG/DL (ref 65–99)
HCT VFR BLD AUTO: 41.6 % (ref 37.5–51)
HGB BLD-MCNC: 12.5 G/DL (ref 13–17.7)
IMM GRANULOCYTES # BLD AUTO: 0.18 10*3/MM3 (ref 0–0.05)
IMM GRANULOCYTES NFR BLD AUTO: 2.3 % (ref 0–0.5)
LYMPHOCYTES # BLD AUTO: 0.79 10*3/MM3 (ref 0.7–3.1)
LYMPHOCYTES NFR BLD AUTO: 10.2 % (ref 19.6–45.3)
MCH RBC QN AUTO: 28.3 PG (ref 26.6–33)
MCHC RBC AUTO-ENTMCNC: 30 G/DL (ref 31.5–35.7)
MCV RBC AUTO: 94.3 FL (ref 79–97)
MONOCYTES # BLD AUTO: 0.73 10*3/MM3 (ref 0.1–0.9)
MONOCYTES NFR BLD AUTO: 9.4 % (ref 5–12)
NEUTROPHILS NFR BLD AUTO: 5.66 10*3/MM3 (ref 1.7–7)
NEUTROPHILS NFR BLD AUTO: 72.7 % (ref 42.7–76)
NRBC BLD AUTO-RTO: 0 /100 WBC (ref 0–0.2)
PHOSPHATE SERPL-MCNC: 3.3 MG/DL (ref 2.5–4.5)
PLATELET # BLD AUTO: 123 10*3/MM3 (ref 140–450)
PMV BLD AUTO: 10.7 FL (ref 6–12)
POTASSIUM SERPL-SCNC: 4.3 MMOL/L (ref 3.5–5.2)
QT INTERVAL: 364 MS
QT INTERVAL: 484 MS
QT INTERVAL: 620 MS
QTC INTERVAL: 471 MS
QTC INTERVAL: 479 MS
QTC INTERVAL: 624 MS
RBC # BLD AUTO: 4.41 10*6/MM3 (ref 4.14–5.8)
SODIUM SERPL-SCNC: 146 MMOL/L (ref 136–145)
WBC NRBC COR # BLD: 7.78 10*3/MM3 (ref 3.4–10.8)

## 2022-10-30 PROCEDURE — 94799 UNLISTED PULMONARY SVC/PX: CPT

## 2022-10-30 PROCEDURE — 80069 RENAL FUNCTION PANEL: CPT | Performed by: INTERNAL MEDICINE

## 2022-10-30 PROCEDURE — 85025 COMPLETE CBC W/AUTO DIFF WBC: CPT | Performed by: INTERNAL MEDICINE

## 2022-10-30 PROCEDURE — 99239 HOSP IP/OBS DSCHRG MGMT >30: CPT | Performed by: INTERNAL MEDICINE

## 2022-10-30 PROCEDURE — 82962 GLUCOSE BLOOD TEST: CPT

## 2022-10-30 PROCEDURE — 94660 CPAP INITIATION&MGMT: CPT

## 2022-10-30 RX ORDER — BUMETANIDE 1 MG/1
1 TABLET ORAL EVERY OTHER DAY
Qty: 15 TABLET | Refills: 2 | Status: SHIPPED | OUTPATIENT
Start: 2022-10-30 | End: 2022-11-11

## 2022-10-30 RX ORDER — AMIODARONE HYDROCHLORIDE 200 MG/1
200 TABLET ORAL EVERY 12 HOURS SCHEDULED
Qty: 60 TABLET | Refills: 0 | Status: SHIPPED | OUTPATIENT
Start: 2022-10-30 | End: 2022-11-29

## 2022-10-30 RX ORDER — METOPROLOL SUCCINATE 25 MG/1
12.5 TABLET, EXTENDED RELEASE ORAL DAILY
Qty: 15 TABLET | Refills: 1 | Status: SHIPPED | OUTPATIENT
Start: 2022-10-30 | End: 2022-11-17 | Stop reason: SDUPTHER

## 2022-10-30 RX ADMIN — METOPROLOL SUCCINATE 12.5 MG: 25 TABLET, FILM COATED, EXTENDED RELEASE ORAL at 09:00

## 2022-10-30 RX ADMIN — Medication 10 ML: at 09:03

## 2022-10-30 RX ADMIN — EMPAGLIFLOZIN 10 MG: 10 TABLET, FILM COATED ORAL at 09:01

## 2022-10-30 RX ADMIN — BUMETANIDE 1 MG: 1 TABLET ORAL at 09:00

## 2022-10-30 RX ADMIN — APIXABAN 10 MG: 5 TABLET, FILM COATED ORAL at 09:00

## 2022-10-30 RX ADMIN — SENNOSIDES AND DOCUSATE SODIUM 2 TABLET: 50; 8.6 TABLET ORAL at 09:01

## 2022-10-30 RX ADMIN — SACUBITRIL AND VALSARTAN 1 TABLET: 24; 26 TABLET, FILM COATED ORAL at 09:01

## 2022-10-30 NOTE — OUTREACH NOTE
Prep Survey    Flowsheet Row Responses   [facility] facility patient discharged from? [facility]   Is LACE score < 7 ? No   Emergency Room discharge w/ pulse ox? No   Eligibility [facility]   Date of Admission 10/25/22   Date of Discharge 10/30/22   Discharge Disposition Home-Health Care Sv   Discharge diagnosis A/C CHF, A/C kidney injury, CKD, uncontrolled T2DM, cirrhosis, Acute RLE DVT   Does the patient have one of the following disease processes/diagnoses(primary or secondary)? CHF   Does the patient have Home health ordered? Yes   What is the Home health agency?  [facility]    Is there a DME ordered? No   Prep survey completed? Yes          YESSICA WAYNE - Registered Nurse

## 2022-10-31 ENCOUNTER — TELEPHONE (OUTPATIENT)
Dept: INTERNAL MEDICINE | Facility: CLINIC | Age: 87
End: 2022-10-31

## 2022-10-31 ENCOUNTER — TRANSITIONAL CARE MANAGEMENT TELEPHONE ENCOUNTER (OUTPATIENT)
Dept: CALL CENTER | Facility: HOSPITAL | Age: 87
End: 2022-10-31

## 2022-10-31 NOTE — TELEPHONE ENCOUNTER
Caller: SINAN - [facility]    Relationship to patient: Other    Best call back number: [phone]    Patient is needing: SINAN FROM [facility] CALLED TO UPDATE DR CHAUDHARY THAT THE PATIENT DISCHARGED FROM [facility] ON 10/30/22, THAT A REFERRAL FOR PHYSICAL THERAPY, OCCUPATIONAL THERAPY, AND NURSING WAS SENT AND THAT THEY WILL BE STARTING THIS TOMORROW 11/01/22    PLEASE ADVISE IF NECESSARY

## 2022-11-03 NOTE — PROGRESS NOTES
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      Patient: Blane Dietz        : 1935  Account: [id number]           Admit Date: 10/25/2022        How to Respond to this query:       a. Click New Note     b. Answer query within the yellow box.                c. Update the Problem List, if applicable.      If you have any questions about this query contact me at: [phone]    Dr. Toussaint:    87 y.o. male with history of aortic valve replacement, hypertension, diabetes mellitus, chronic kidney disease, and congestive heart failure, presenting with shortness of breath.  Labs include proBNP 9,609.  Cardiology progress note incldues \"acute on chronic diastolic heart failure.\"  Echocardiogram summary includes \"Ejection fraction estimated at 45 to 50%.  Estimated right ventricular systolic pressure from tricuspid regurgitation is mildly elevated (35-45 mmHg). Mild to moderate AI.  Mild MR.\"      Can you please clarify etiology of the congestive heart failure as:    - heart failure due to hypertension  - heart failure due to valvular disease  - other (specify) _________________  - unable to determine        By submitting this query, we are merely seeking further clarification of documentation to accurately reflect all conditions that you are monitoring, evaluating, treating or that extend the hospitalization or utilize additional resources of care. Please utilize your independent clinical judgment when addressing the question(s) above.     This query and your response, once completed, will be entered into the legal medical record.    Sincerely,  Jo Nicholas RN, MSN  Clinical Documentation Integrity Program   [email or web address]    "

## 2022-11-04 NOTE — PROGRESS NOTES
"Enter Query Response Below      Query Response:         Not able to determine  Electronically signed by Bridget Molina, , 22, 4:04 PM EDT.       If applicable, please update the problem list.      Patient: Blane Dietz        : 1935  Account: [id number]           Admit Date: 10/25/2022        How to Respond to this query:       a. Click New Note     b. Answer query within the yellow box.                c. Update the Problem List, if applicable.      If you have any questions about this query contact me at: [phone]     Dr. Molina:    87 y.o. male with history of aortic valve replacement, hypertension, diabetes mellitus, chronic kidney disease, and congestive heart failure, presenting with shortness of breath.  Progress notes state \"GERALD-cr baseline 1.15-1.45.\"  Labs include:  10/25/22 Creatinine: 1.65 BUN: 28 eGFR: 40.4   10/26/22 Creatinine: 1.38 BUN: 31 eGFR: 50.1   10/27/22 Creatinine: 1.34 BUN: 32 eGFR: 51.9   10/28/22 Creatinine: 1.37 BUN: 32 eGFR: 50.6   10/29/22 Creatinine: 1.25 BUN: 30 eGFR: 56.4  10/30/22 Creatinine: 1.17 BUN: 30 eGFR: 61.1  Treatment includes monitoring labs with diuresis.      After study, was GERALD clinically supported during this admission?    - GERALD was supported with additional clinical indicators: ______________  - GERALD was not supported  - other - specify: __________________  - unable to determine     GERALD is defined as any of the following:   Increase in SCr by 0.3 mg/dl within 48 hours; or    Increase in SCr to >1.5 times baseline, which is known or presumed to have occurred within the prior 7 days; or   Urine volume <0.5 ml/kg/h for 6 hours.  Reference article: http://www.kdigo.org/clinical_practice_guidelines/pdf/KDIGO%20AKI%20Guideline.pdf (as published in [employer] Supp[employer], The Official Journal of the International Society of Nephrology, vol. 2, issue 1, 2012.)    By submitting this query, we are merely seeking further " clarification of documentation to accurately reflect all conditions that you are monitoring, evaluating, treating or that extend the hospitalization or utilize additional resources of care. Please utilize your independent clinical judgment when addressing the question(s) above.     This query and your response, once completed, will be entered into the legal medical record.    Sincerely,  Jo Nicholas RN, MSN  Clinical Documentation Integrity Program   [email or web address]

## 2022-11-10 ENCOUNTER — READMISSION MANAGEMENT (OUTPATIENT)
Dept: CALL CENTER | Facility: HOSPITAL | Age: 87
End: 2022-11-10

## 2022-11-10 NOTE — OUTREACH NOTE
CHF Week 2 Survey    Flowsheet Row Responses   [facility] facility patient discharged from? [place]   Does the patient have one of the following disease processes/diagnoses(primary or secondary)? CHF   Week 2 attempt successful? No   Unsuccessful attempts Attempt 1          LIMA MCKEON - Registered Nurse

## 2022-11-11 ENCOUNTER — OFFICE VISIT (OUTPATIENT)
Dept: INTERNAL MEDICINE | Facility: CLINIC | Age: 87
End: 2022-11-11

## 2022-11-11 VITALS
WEIGHT: 216 LBS | TEMPERATURE: 97.3 F | OXYGEN SATURATION: 93 % | HEIGHT: 66 IN | SYSTOLIC BLOOD PRESSURE: 140 MMHG | BODY MASS INDEX: 34.72 KG/M2 | HEART RATE: 88 BPM | DIASTOLIC BLOOD PRESSURE: 70 MMHG

## 2022-11-11 DIAGNOSIS — I10 ESSENTIAL HYPERTENSION: ICD-10-CM

## 2022-11-11 DIAGNOSIS — C83.39 DIFFUSE LARGE B-CELL LYMPHOMA OF SOLID ORGAN EXCLUDING SPLEEN: ICD-10-CM

## 2022-11-11 DIAGNOSIS — I50.9 ACUTE ON CHRONIC CONGESTIVE HEART FAILURE, UNSPECIFIED HEART FAILURE TYPE: Primary | ICD-10-CM

## 2022-11-11 DIAGNOSIS — N18.32 STAGE 3B CHRONIC KIDNEY DISEASE: ICD-10-CM

## 2022-11-11 PROBLEM — R77.8 ELEVATED TROPONIN: Status: RESOLVED | Noted: 2022-06-03 | Resolved: 2022-11-11

## 2022-11-11 PROBLEM — R79.89 ELEVATED TROPONIN: Status: RESOLVED | Noted: 2022-06-03 | Resolved: 2022-11-11

## 2022-11-11 PROCEDURE — 99495 TRANSJ CARE MGMT MOD F2F 14D: CPT | Performed by: INTERNAL MEDICINE

## 2022-11-11 PROCEDURE — 1111F DSCHRG MED/CURRENT MED MERGE: CPT | Performed by: INTERNAL MEDICINE

## 2022-11-11 RX ORDER — EMPAGLIFLOZIN 10 MG/1
10 TABLET, FILM COATED ORAL DAILY
Qty: 90 TABLET | Refills: 3 | Status: SHIPPED | OUTPATIENT
Start: 2022-11-11 | End: 2023-01-19 | Stop reason: SDUPTHER

## 2022-11-11 RX ORDER — MULTIPLE VITAMINS W/ MINERALS TAB 9MG-400MCG
1 TAB ORAL DAILY
COMMUNITY

## 2022-11-11 RX ORDER — BUMETANIDE 2 MG/1
2 TABLET ORAL DAILY
Qty: 30 TABLET | Refills: 1 | Status: SHIPPED | OUTPATIENT
Start: 2022-11-11 | End: 2022-11-17 | Stop reason: SDUPTHER

## 2022-11-11 NOTE — PROGRESS NOTES
Transitional Care Follow Up Visit  Subjective     Blane Dietz is a 87 y.o. male who presents for a transitional care management visit.    Within 48 business hours after discharge our office contacted him via telephone to coordinate his care and needs.      I reviewed and discussed the details of that call along with the discharge summary, hospital problems, inpatient lab results, inpatient diagnostic studies, and consultation reports with Blane.     Current outpatient and discharge medications have been reconciled for the patient.  Reviewed by: David Em MD      Date of TCM Phone Call 10/30/2022   [facility]   Date of Admission 10/25/2022   Date of Discharge 10/30/2022   Discharge Disposition Home-Health Care [facility]     Risk for Readmission (LACE) Score: 15 (10/30/2022  6:00 AM)      History of Present Illness   Course During Hospital Stay: 87-year-old male with a history of chronic kidney disease hypertension poorly controlled diabetes admitted at [facility] in [place] with complaints of shortness of breath noted to be in respiratory failure.  He was placed on IV diuretic therapy.  Underwent work-up with an echocardiogram showing an ejection fraction of around 45%.  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  There was evidence of cirrhosis with moderate ascites in all 4 quadrants.  A significant right-sided pleural effusion was noted.  Liver ultrasound confirmed cirrhosis.  He was discharged on Bumex 1 mg on alternate days.  He has not been compliant with his medications at home.  Home health has been initiated.  Today is coming in accompanied by his daughter who is giving us most of the history.  Patient being a poor historian.  He has developed significant lower extremity edema and abdominal distention.  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 fib         Review of Systems   Constitutional: Positive for fatigue. Negative for activity change, appetite change and fever.   HENT: Negative for congestion, ear discharge, ear pain and trouble swallowing.    Eyes: Negative for photophobia and visual disturbance.   Respiratory: Positive for shortness of breath. Negative for cough.    Cardiovascular: Negative for chest pain and palpitations.   Gastrointestinal: Positive for abdominal distention and abdominal pain. Negative for constipation, diarrhea, nausea and vomiting.   Endocrine: Negative.    Genitourinary: Negative for dysuria, hematuria and urgency.   Musculoskeletal: Positive for arthralgias. Negative for back pain, joint swelling and myalgias.   Skin: Negative for color change and rash.   Allergic/Immunologic: Negative.    Neurological: Negative for dizziness, weakness, light-headedness and headaches.   Hematological: Negative for adenopathy. Does not bruise/bleed easily.   Psychiatric/Behavioral: Positive for sleep disturbance. Negative for agitation, confusion and dysphoric mood. The patient is not nervous/anxious.        Objective   Physical Exam  Constitutional:       General: He is not in acute distress.     Appearance: He is well-developed. He is obese.   HENT:      Nose: Nose normal.   Eyes:      General: No scleral icterus.     Conjunctiva/sclera: Conjunctivae normal.   Neck:      Thyroid: No thyromegaly.      Trachea: No tracheal deviation.   Cardiovascular:      Rate and Rhythm: Normal rate. Rhythm irregular.      Heart sounds: No murmur heard.    No friction rub.   Pulmonary:      Effort: No respiratory distress.      Breath sounds: Rales present. No wheezing.          Comments: Decreased breath sounds.  On percussion  Abdominal:      General: There is distension.      Palpations: Abdomen is soft. There is no mass.      Tenderness: There is no abdominal tenderness. There is no guarding.   Musculoskeletal:          General: No deformity. Normal range of motion.      Right lower leg: Edema present.      Left lower leg: Edema present.   Lymphadenopathy:      Cervical: No cervical adenopathy.   Skin:     General: Skin is warm and dry.      Findings: No erythema or rash.   Neurological:      Mental Status: He is alert and oriented to person, place, and time.      Cranial Nerves: No cranial nerve deficit.      Coordination: Coordination normal.      Deep Tendon Reflexes: Reflexes are normal and symmetric.   Psychiatric:         Behavior: Behavior normal.         Thought Content: Thought content normal.         Judgment: Judgment normal.         Assessment & Plan   Diagnoses and all orders for this visit:    1. Acute on chronic congestive heart failure, unspecified heart failure type (HCC) (Primary) needs aggressive diuresis.  Reviewed previous lab work from about a week ago.  Did not have significant electrolyte abnormalities or evidence of significant intravascular volume depletion.  Will be aggressive with diuresis with Bumex 2 mg daily.  Consider adding on Aldactone.  Continue with Entresto  A. fib rate control is okay continue with amiodarone.  Add TSH to labs along with BMP through home health in 2 to 3 days  Continue Eliquis.  Counseled patient and daughter about significant salt reduction  2. Stage 3b chronic kidney disease (HCC) follow renal function    3. Essential hypertension stable with current meds.  He is on low-dose beta-blocker    4. 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  Also has prostate CA for which she is undergoing treatment  Follow-up again in a week  Other orders  -     Jardiance 10 MG tablet tablet; Take 1 tablet by mouth Daily.  Dispense: 90 tablet; Refill: 3  -     bumetanide (BUMEX) 2 MG tablet; Take 1 tablet by mouth Daily.  Dispense: 30 tablet; Refill: 1

## 2022-11-14 ENCOUNTER — READMISSION MANAGEMENT (OUTPATIENT)
Dept: CALL CENTER | Facility: HOSPITAL | Age: 87
End: 2022-11-14

## 2022-11-14 ENCOUNTER — TELEPHONE (OUTPATIENT)
Dept: ONCOLOGY | Facility: CLINIC | Age: 87
End: 2022-11-14

## 2022-11-14 NOTE — OUTREACH NOTE
CHF Week 2 Survey    Flowsheet Row Responses   [facility] patient discharged from? [place]   Does the patient have one of the following disease processes/diagnoses(primary or secondary)? CHF   Week 2 attempt successful? Yes   Call start time 1346   Call end time 1354   General alerts for this patient Pt lives in assisted living fac.   Discharge diagnosis A/C CHF, A/C kidney injury, CKD, uncontrolled T2DM, cirrhosis, Acute RLE DVT   Is patient permission given to speak with other caregiver? Yes   List who call center can speak with Susana Dietz Power of     Person spoke with today (if not patient) and relationship Susana Dietz Power of     Meds reviewed with patient/caregiver? Yes   Does the patient have all medications ordered at discharge? Yes   Is the patient taking all medications as directed (includes completed medication regime)? Yes   Medication comments Daughter reports patients diuretic dose has been doubled and frequency increase to daily.    Does the patient have a primary care provider?  Yes   Comments regarding PCP Saw PCP 11/11,  returning to see PCP thurs 11/17   Has the patient kept scheduled appointments due by today? Yes   What is the Home health agency?  [facility]    Has home health visited the patient within 72 hours of discharge? Yes   DME comments Has home O2 and bipap   Psychosocial issues? No   What is the patient's perception of their health status since discharge? Worsening  [Patient worsening with increase fluid retention. Seen by PCP and returning on Thursday for recheck. ]   Nursing interventions Nurse provided patient education, Advised patient to call provider   Is the patient able to teach back signs and symptoms of worsening condition? (i.e. weight gain, shortness of air, etc.) Yes   If the patient is a current smoker, are they able to teach back resources for cessation? Not a smoker   Is the patient/caregiver able to teach back the hierarchy of who  to call/visit for symptoms/problems? PCP, Specialist, Home health nurse, Urgent Care, ED, 911 Yes   CHF Zone this Call Yellow Zone   Yellow Zone Sudden weight gain of more than 2-3 lbs in a 24 hour period (or 5 lbs in a week), Worsening shortness of breath with activity  [Daughter reports patient in assisted living, but no scale for daily weights. Weighed in Dr office. ]   Yellow Zone Interventions Consider contacting your doctor or health care team, Consider asking your health care team about a change in medications   CHF Week 2 call completed? Yes   Call end time [id number]          LIMA MCKEON - Registered Nurse

## 2022-11-14 NOTE — TELEPHONE ENCOUNTER
Caller: Susana Dietz    Relationship to patient: Emergency Contact    Best call back number: [phone]    Chief complaint: PATIENTS DAUGHTER CALLED TO CANCEL APPOINTMENT AND THEY WILL CALL BACK TO RESCHEDULE     Type of visit: FOLLOW UP AND INFUSION         If rescheduling, when is the original appointment: 11-16-22     Additional notes:PLEASE CANCEL FOR PATIENT

## 2022-11-16 ENCOUNTER — LAB REQUISITION (OUTPATIENT)
Dept: LAB | Facility: HOSPITAL | Age: 87
End: 2022-11-16

## 2022-11-16 DIAGNOSIS — I10 ESSENTIAL (PRIMARY) HYPERTENSION: ICD-10-CM

## 2022-11-16 LAB
ANION GAP SERPL CALCULATED.3IONS-SCNC: 10 MMOL/L (ref 5–15)
BUN SERPL-MCNC: 37 MG/DL (ref 8–23)
BUN/CREAT SERPL: 23 (ref 7–25)
CALCIUM SPEC-SCNC: 8.7 MG/DL (ref 8.6–10.5)
CHLORIDE SERPL-SCNC: 98 MMOL/L (ref 98–107)
CO2 SERPL-SCNC: 32 MMOL/L (ref 22–29)
CREAT SERPL-MCNC: 1.61 MG/DL (ref 0.76–1.27)
EGFRCR SERPLBLD CKD-EPI 2021: 41.6 ML/MIN/1.73
GLUCOSE SERPL-MCNC: 142 MG/DL (ref 65–99)
POTASSIUM SERPL-SCNC: 4.6 MMOL/L (ref 3.5–5.2)
SODIUM SERPL-SCNC: 140 MMOL/L (ref 136–145)

## 2022-11-16 PROCEDURE — 80048 BASIC METABOLIC PNL TOTAL CA: CPT | Performed by: INTERNAL MEDICINE

## 2022-11-17 ENCOUNTER — OFFICE VISIT (OUTPATIENT)
Dept: INTERNAL MEDICINE | Facility: CLINIC | Age: 87
End: 2022-11-17

## 2022-11-17 VITALS
HEIGHT: 66 IN | SYSTOLIC BLOOD PRESSURE: 110 MMHG | BODY MASS INDEX: 32.47 KG/M2 | WEIGHT: 202 LBS | DIASTOLIC BLOOD PRESSURE: 60 MMHG | TEMPERATURE: 96.9 F | HEART RATE: 68 BPM | OXYGEN SATURATION: 96 %

## 2022-11-17 DIAGNOSIS — I10 ESSENTIAL HYPERTENSION: ICD-10-CM

## 2022-11-17 DIAGNOSIS — I50.9 ACUTE ON CHRONIC CONGESTIVE HEART FAILURE, UNSPECIFIED HEART FAILURE TYPE: ICD-10-CM

## 2022-11-17 DIAGNOSIS — E11.65 UNCONTROLLED TYPE 2 DIABETES MELLITUS WITH HYPERGLYCEMIA: Primary | ICD-10-CM

## 2022-11-17 PROCEDURE — 90662 IIV NO PRSV INCREASED AG IM: CPT | Performed by: INTERNAL MEDICINE

## 2022-11-17 PROCEDURE — G0008 ADMIN INFLUENZA VIRUS VAC: HCPCS | Performed by: INTERNAL MEDICINE

## 2022-11-17 PROCEDURE — 99214 OFFICE O/P EST MOD 30 MIN: CPT | Performed by: INTERNAL MEDICINE

## 2022-11-17 RX ORDER — BUMETANIDE 2 MG/1
1 TABLET ORAL DAILY
Qty: 30 TABLET | Refills: 1
Start: 2022-11-17 | End: 2022-12-01 | Stop reason: SDUPTHER

## 2022-11-17 RX ORDER — METOPROLOL SUCCINATE 25 MG/1
12.5 TABLET, EXTENDED RELEASE ORAL DAILY
Qty: 15 TABLET | Refills: 1 | Status: SHIPPED | OUTPATIENT
Start: 2022-11-17 | End: 2022-12-17

## 2022-11-17 NOTE — PROGRESS NOTES
Subjective  Blane Dietz is a 87 y.o. male    HPI coming in for follow-up patient with a history of acute on chronic congestive heart failure poorly controlled diabetes has not been compliant with his diet and medications was evaluated by me a week ago with evidence of significant fluid overload post discharge from the hospital.  He was placed on Bumex 2 mg daily discussed sodium restrictions.  He was advised compliance with his medications.  He has now dropped about 15 pounds has had improvement in his breathing and leg swelling.  The weeping sores in his legs have resolved.  He still not taking Trulicity.  Most recent A1c did show suboptimal control    The following portions of the patient's history were reviewed and updated as appropriate: allergies, current medications, past family history, past medical history, past social history, past surgical history, and problem list.     Review of Systems   Constitutional: Positive for fatigue. Negative for activity change, appetite change and fever.   HENT: Negative for congestion, ear discharge, ear pain and trouble swallowing.    Eyes: Negative for photophobia and visual disturbance.   Respiratory: Negative for cough and shortness of breath.    Cardiovascular: Positive for leg swelling. Negative for chest pain and palpitations.   Gastrointestinal: Negative for abdominal distention, abdominal pain, constipation, diarrhea, nausea and vomiting.   Endocrine: Negative.    Genitourinary: Negative for dysuria, hematuria and urgency.   Musculoskeletal: Positive for arthralgias and gait problem. Negative for back pain, joint swelling and myalgias.   Skin: Negative for color change and rash.   Allergic/Immunologic: Negative.    Neurological: Negative for dizziness, weakness, light-headedness and headaches.   Hematological: Negative for adenopathy. Does not bruise/bleed easily.   Psychiatric/Behavioral: Negative for agitation, confusion and dysphoric mood. The patient is not  "nervous/anxious.        Visit Vitals  /60   Pulse 68   Temp 96.9 °F (36.1 °C) (Infrared)   Ht 167.6 cm (66\")   Wt 91.6 kg (202 lb)   SpO2 96%   BMI 32.60 kg/m²       Objective  Physical Exam  Constitutional:       General: He is not in acute distress.     Appearance: He is well-developed.   HENT:      Nose: Nose normal.   Eyes:      General: No scleral icterus.     Conjunctiva/sclera: Conjunctivae normal.   Neck:      Thyroid: No thyromegaly.      Trachea: No tracheal deviation.   Cardiovascular:      Rate and Rhythm: Normal rate. Rhythm irregular.      Heart sounds: No murmur heard.    No friction rub.   Pulmonary:      Effort: No respiratory distress.      Breath sounds: No wheezing or rales.   Abdominal:      General: There is no distension.      Palpations: Abdomen is soft. There is no mass.      Tenderness: There is no abdominal tenderness. There is no guarding.   Musculoskeletal:         General: No deformity. Normal range of motion.      Right lower leg: Edema present.      Left lower leg: Edema present.   Lymphadenopathy:      Cervical: No cervical adenopathy.   Skin:     General: Skin is warm and dry.      Findings: No erythema or rash.   Neurological:      Mental Status: He is alert and oriented to person, place, and time.      Cranial Nerves: No cranial nerve deficit.      Coordination: Coordination normal.      Deep Tendon Reflexes: Reflexes are normal and symmetric.   Psychiatric:         Behavior: Behavior normal.         Thought Content: Thought content normal.         Judgment: Judgment normal.         Diagnoses and all orders for this visit:    Uncontrolled type 2 diabetes mellitus with hyperglycemia (HCC) urged compliance with medications advised to resume Trulicity continue with his insulin and Jardiance has not had episodes suggestive of hypoglycemia    Acute on chronic congestive heart failure, unspecified heart failure type (HCC) will now reduce Bumex to 1 mg daily.  Lab work done " yesterday shows evidence of some intravascular volume depletion with mild elevation of his BUN and creatinine    Essential hypertension stable on current meds  Follow-up again in 2 weeks discussed plan with patient and daughter who was with him    Other orders  -     metoprolol succinate XL (TOPROL-XL) 25 MG 24 hr tablet; Take 0.5 tablets by mouth Daily for 30 days.  -     Fluzone High-Dose 65+yrs ([id number])  -     bumetanide (BUMEX) 2 MG tablet; Take 0.5 tablets by mouth Daily.

## 2022-11-21 ENCOUNTER — OUTSIDE FACILITY SERVICE (OUTPATIENT)
Dept: INTERNAL MEDICINE | Facility: CLINIC | Age: 87
End: 2022-11-21

## 2022-11-21 PROCEDURE — G0180 MD CERTIFICATION HHA PATIENT: HCPCS | Performed by: INTERNAL MEDICINE

## 2022-11-23 ENCOUNTER — READMISSION MANAGEMENT (OUTPATIENT)
Dept: CALL CENTER | Facility: HOSPITAL | Age: 87
End: 2022-11-23

## 2022-11-23 ENCOUNTER — APPOINTMENT (OUTPATIENT)
Dept: GENERAL RADIOLOGY | Facility: HOSPITAL | Age: 87
End: 2022-11-23

## 2022-11-23 ENCOUNTER — HOSPITAL ENCOUNTER (EMERGENCY)
Facility: HOSPITAL | Age: 87
Discharge: HOME | End: 2022-11-23
Attending: EMERGENCY MEDICINE | Admitting: EMERGENCY MEDICINE

## 2022-11-23 VITALS
DIASTOLIC BLOOD PRESSURE: 79 MMHG | SYSTOLIC BLOOD PRESSURE: 138 MMHG | RESPIRATION RATE: 20 BRPM | TEMPERATURE: 98.1 F | BODY MASS INDEX: 32.47 KG/M2 | OXYGEN SATURATION: 99 % | HEART RATE: 73 BPM | HEIGHT: 66 IN | WEIGHT: 202 LBS

## 2022-11-23 DIAGNOSIS — R06.02 SHORTNESS OF BREATH: Primary | ICD-10-CM

## 2022-11-23 DIAGNOSIS — R60.0 LOWER EXTREMITY EDEMA: ICD-10-CM

## 2022-11-23 LAB
ALBUMIN SERPL-MCNC: 3.9 G/DL (ref 3.5–5.2)
ALBUMIN/GLOB SERPL: 1.4 G/DL
ALP SERPL-CCNC: 286 U/L (ref 39–117)
ALT SERPL W P-5'-P-CCNC: 35 U/L (ref 1–41)
ANION GAP SERPL CALCULATED.3IONS-SCNC: 10.5 MMOL/L (ref 5–15)
AST SERPL-CCNC: 43 U/L (ref 1–40)
BASOPHILS # BLD AUTO: 0.18 10*3/MM3 (ref 0–0.2)
BASOPHILS NFR BLD AUTO: 1.9 % (ref 0–1.5)
BILIRUB SERPL-MCNC: 1 MG/DL (ref 0–1.2)
BILIRUB UR QL STRIP: NEGATIVE
BUN SERPL-MCNC: 39 MG/DL (ref 8–23)
BUN/CREAT SERPL: 26.5 (ref 7–25)
CALCIUM SPEC-SCNC: 8.6 MG/DL (ref 8.6–10.5)
CHLORIDE SERPL-SCNC: 99 MMOL/L (ref 98–107)
CLARITY UR: CLEAR
CO2 SERPL-SCNC: 27.5 MMOL/L (ref 22–29)
COLOR UR: YELLOW
CREAT SERPL-MCNC: 1.47 MG/DL (ref 0.76–1.27)
DEPRECATED RDW RBC AUTO: 58.3 FL (ref 37–54)
EGFRCR SERPLBLD CKD-EPI 2021: 46.5 ML/MIN/1.73
EOSINOPHIL # BLD AUTO: 0.35 10*3/MM3 (ref 0–0.4)
EOSINOPHIL NFR BLD AUTO: 3.8 % (ref 0.3–6.2)
ERYTHROCYTE [DISTWIDTH] IN BLOOD BY AUTOMATED COUNT: 17.5 % (ref 12.3–15.4)
GLOBULIN UR ELPH-MCNC: 2.8 GM/DL
GLUCOSE BLDC GLUCOMTR-MCNC: 182 MG/DL (ref 70–130)
GLUCOSE SERPL-MCNC: 167 MG/DL (ref 65–99)
GLUCOSE UR STRIP-MCNC: ABNORMAL MG/DL
HCT VFR BLD AUTO: 37 % (ref 37.5–51)
HGB BLD-MCNC: 11.7 G/DL (ref 13–17.7)
HGB UR QL STRIP.AUTO: NEGATIVE
HOLD SPECIMEN: NORMAL
HOLD SPECIMEN: NORMAL
IMM GRANULOCYTES # BLD AUTO: 0.26 10*3/MM3 (ref 0–0.05)
IMM GRANULOCYTES NFR BLD AUTO: 2.8 % (ref 0–0.5)
KETONES UR QL STRIP: NEGATIVE
LEUKOCYTE ESTERASE UR QL STRIP.AUTO: NEGATIVE
LYMPHOCYTES # BLD AUTO: 0.93 10*3/MM3 (ref 0.7–3.1)
LYMPHOCYTES NFR BLD AUTO: 10.1 % (ref 19.6–45.3)
MAGNESIUM SERPL-MCNC: 2.6 MG/DL (ref 1.6–2.4)
MCH RBC QN AUTO: 28.7 PG (ref 26.6–33)
MCHC RBC AUTO-ENTMCNC: 31.6 G/DL (ref 31.5–35.7)
MCV RBC AUTO: 90.7 FL (ref 79–97)
MONOCYTES # BLD AUTO: 0.87 10*3/MM3 (ref 0.1–0.9)
MONOCYTES NFR BLD AUTO: 9.4 % (ref 5–12)
NEUTROPHILS NFR BLD AUTO: 6.66 10*3/MM3 (ref 1.7–7)
NEUTROPHILS NFR BLD AUTO: 72 % (ref 42.7–76)
NITRITE UR QL STRIP: NEGATIVE
NRBC BLD AUTO-RTO: 0 /100 WBC (ref 0–0.2)
NT-PROBNP SERPL-MCNC: 7383 PG/ML (ref 0–1800)
PH UR STRIP.AUTO: 7.5 [PH] (ref 5–8)
PLATELET # BLD AUTO: 141 10*3/MM3 (ref 140–450)
PMV BLD AUTO: 10.8 FL (ref 6–12)
POTASSIUM SERPL-SCNC: 4 MMOL/L (ref 3.5–5.2)
PROT SERPL-MCNC: 6.7 G/DL (ref 6–8.5)
PROT UR QL STRIP: NEGATIVE
RBC # BLD AUTO: 4.08 10*6/MM3 (ref 4.14–5.8)
SODIUM SERPL-SCNC: 137 MMOL/L (ref 136–145)
SP GR UR STRIP: 1.01 (ref 1–1.03)
TROPONIN T SERPL-MCNC: 0.01 NG/ML (ref 0–0.03)
UROBILINOGEN UR QL STRIP: ABNORMAL
WBC NRBC COR # BLD: 9.25 10*3/MM3 (ref 3.4–10.8)
WHOLE BLOOD HOLD COAG: NORMAL
WHOLE BLOOD HOLD SPECIMEN: NORMAL

## 2022-11-23 PROCEDURE — 84484 ASSAY OF TROPONIN QUANT: CPT

## 2022-11-23 PROCEDURE — 36415 COLL VENOUS BLD VENIPUNCTURE: CPT

## 2022-11-23 PROCEDURE — 83880 ASSAY OF NATRIURETIC PEPTIDE: CPT

## 2022-11-23 PROCEDURE — 82962 GLUCOSE BLOOD TEST: CPT

## 2022-11-23 PROCEDURE — 96374 THER/PROPH/DIAG INJ IV PUSH: CPT

## 2022-11-23 PROCEDURE — 93005 ELECTROCARDIOGRAM TRACING: CPT

## 2022-11-23 PROCEDURE — 81003 URINALYSIS AUTO W/O SCOPE: CPT

## 2022-11-23 PROCEDURE — 80053 COMPREHEN METABOLIC PANEL: CPT

## 2022-11-23 PROCEDURE — 71045 X-RAY EXAM CHEST 1 VIEW: CPT

## 2022-11-23 PROCEDURE — 99283 EMERGENCY DEPT VISIT LOW MDM: CPT

## 2022-11-23 PROCEDURE — 83735 ASSAY OF MAGNESIUM: CPT

## 2022-11-23 PROCEDURE — 25010000002 FUROSEMIDE PER 20 MG

## 2022-11-23 PROCEDURE — 85025 COMPLETE CBC W/AUTO DIFF WBC: CPT

## 2022-11-23 RX ORDER — FUROSEMIDE 10 MG/ML
80 INJECTION INTRAMUSCULAR; INTRAVENOUS ONCE
Status: COMPLETED | OUTPATIENT
Start: 2022-11-23 | End: 2022-11-23

## 2022-11-23 RX ORDER — SODIUM CHLORIDE 0.9 % (FLUSH) 0.9 %
10 SYRINGE (ML) INJECTION AS NEEDED
Status: DISCONTINUED | OUTPATIENT
Start: 2022-11-23 | End: 2022-11-23 | Stop reason: HOSPADM

## 2022-11-23 RX ADMIN — FUROSEMIDE 80 MG: 10 INJECTION, SOLUTION INTRAMUSCULAR; INTRAVENOUS at 18:00

## 2022-11-23 NOTE — ED PROVIDER NOTES
Subjective   History of Present Illness  Patient is an 87-year-old male here today with swelling in his legs and worsening shortness of breath.  He is accompanied by his daughter who helps provide history as well.  She reports that he was recently admitted to [facility] approximately 3 weeks ago for congestive heart failure.  He was discharged home on bumetanide every other day to help with the excess fluid.  When he followed up with his primary provider last week he was noted to have weight loss due to the diuretic, but was increased to use it daily.  Since his follow-up, the patient has not noticed any significant decrease in his weight, but he has noticed worsening in the swelling of his legs, ankles, and feet, as well as his lower abdomen.  He also notes a large blister on his left shin that appeared within the last couple days.  He denies chest pain, cough, or fever.  He does note that he has been unable to tolerate a lying position due to a smothering sensation.  Has also been experiencing some nasal congestion which has affected his ability to tolerate his CPAP machine.      Review of Systems   Constitutional: Negative for chills, diaphoresis, fatigue and fever.   Eyes: Negative for visual disturbance.   Respiratory: Positive for shortness of breath. Negative for cough, chest tightness and wheezing.    Cardiovascular: Positive for leg swelling. Negative for chest pain and palpitations.   Gastrointestinal: Negative for nausea and vomiting.   Genitourinary: Negative for dysuria and flank pain.   Musculoskeletal: Negative for back pain.   Skin: Negative for color change and pallor.   Neurological: Negative for dizziness, weakness, light-headedness and numbness.   All other systems reviewed and are negative.      Past Medical History:   Diagnosis Date   • Aortic stenosis     status post ROSS procedure, remote, with December 2015 echocardiography revealing normal aortic and pulmonary valve function,  normal ejection fraction and mild to moderate MR   • Arthritis    • Bilateral impacted cerumen 11/23/2016   • Bronchitis    • CHF (congestive heart failure) ([facility])    • Chronic gout of right foot 06/13/2016    Impression: 03/08/2016 - stable.;    • COVID-19 vaccine series completed 05/12/2021    Maderna 2nd dose 3/12/21.   • Depression    • Diabetes mellitus (HCC)    • Diverticulitis    • Exogenous obesity    • Gout    • Heart murmur    • History of vitamin D deficiency    • Hypercholesteremia 08/11/2016   • Hyperlipidemia    • Hypertension    • Kidney lesion    • Malignant neoplasm of prostate (HCC)    • Morbid obesity (HCC) 08/11/2016   • Pneumonia 05/12/2021   • Primary osteoarthritis involving multiple joints 06/13/2016    Impression: 03/08/2016 - stable;    • Pulmonary embolism ([facility])    • Sleep apnea 05/12/2021    C-Pap   • Uncontrolled type 2 diabetes mellitus with hyperglycemia ([facility]) 06/13/2016    Impression: 03/08/2016 - seeing Endo .;    • Vertigo        Allergies   Allergen Reactions   • Ampicillin Anaphylaxis   • Medrol [Methylprednisolone] Unknown - High Severity     Made his blood sugar get really high, can't take this   • Myrbetriq [Mirabegron] Unknown - High Severity     High BP   • Penicillins Anaphylaxis and Shortness Of Breath   • Bee Venom Swelling   • Melatonin Hallucinations       Past Surgical History:   Procedure Laterality Date   • CARDIAC VALVE REPLACEMENT  05/12/2021    Ross procedure 22 years ago   • COLON SURGERY     • COLONOSCOPY     • COLOSTOMY  1999   • COLOSTOMY REVISION     • CYSTOSCOPY N/A 12/7/2021    Procedure: CYSTOSCOPY FLEXIBLE;  Surgeon: José Miguel Villafuerte Jr., MD;  Location: [facility];  Service: Urology;  Laterality: N/A;   • EXPLORATORY LAPAROTOMY      With Tissue Removal   • LIPOMA EXCISION     • MOHS SURGERY     • NEPHROURETERECTOMY Right 12/7/2021    Procedure: RIGHT NEPHROURETERECTOMY LAPAROSCOPIC WITH DAVINCI ROBOT;  Surgeon: José Miguel Villafuerte Jr., MD;  Location:   [facility];  Service: Robotics - DaVinci;  Laterality: Right;   • OTHER SURGICAL HISTORY      Porcine Valve   • PROSTATE SURGERY     • PROSTATECTOMY  2000     History of Prostatectomy Perineal Radical   • SKIN CANCER EXCISION      from head and lip   • TONSILLECTOMY         Family History   Problem Relation Age of Onset   • Diabetes Mother    • Lung cancer Mother    • Cancer Mother    • Heart disease Father    • Breast cancer Other    • Cancer Other    • Hypertension Other    • Migraines Other    • Obesity Other    • Diabetes Other        Social History     Socioeconomic History   • Marital status: Single   Tobacco Use   • Smoking status: Never   • Smokeless tobacco: Never   Vaping Use   • Vaping Use: Never used   Substance and Sexual Activity   • Alcohol use: No   • Drug use: No   • Sexual activity: Defer           Objective   Physical Exam  Vitals and nursing note reviewed.   Constitutional:       Appearance: Normal appearance.   HENT:      Head: Normocephalic and atraumatic.      Right Ear: Tympanic membrane, ear canal and external ear normal.      Left Ear: Tympanic membrane, ear canal and external ear normal.      Nose: Nose normal.      Mouth/Throat:      Mouth: Mucous membranes are moist.      Pharynx: Oropharynx is clear.   Eyes:      Extraocular Movements: Extraocular movements intact.      Conjunctiva/sclera: Conjunctivae normal.      Pupils: Pupils are equal, round, and reactive to light.   Neck:      Vascular: No carotid bruit.   Cardiovascular:      Rate and Rhythm: Normal rate and regular rhythm.      Pulses: Normal pulses.      Heart sounds: Normal heart sounds.      Comments: Large serous filled vesicle to the anterior mid left tibia. Other small minute vesicles noted.  Pulmonary:      Effort: Pulmonary effort is normal.      Breath sounds: Normal breath sounds.   Abdominal:      General: Abdomen is flat. Bowel sounds are normal. There is no distension.      Palpations: Abdomen is soft.       Tenderness: There is no abdominal tenderness.   Musculoskeletal:      Cervical back: Neck supple. No tenderness.      Right lower le+ Pitting Edema present.      Left lower le+ Pitting Edema present.   Lymphadenopathy:      Cervical: No cervical adenopathy.   Skin:     General: Skin is warm and dry.      Capillary Refill: Capillary refill takes less than 2 seconds.   Neurological:      General: No focal deficit present.      Mental Status: He is alert and oriented to person, place, and time.   Psychiatric:         Mood and Affect: Mood normal.         Behavior: Behavior normal.         Procedures           ED Course  ED Course as of 22 1422      174 WBC: 9.25 [TA]   1748 RBC(!): 4.08 [TA]   1748 Hemoglobin(!): 11.7 [TA]   1748 Hematocrit(!): 37.0 [TA]   1748 Platelets: 141 [TA]   1759 Glucose(!): 167 [TA]   1759 BUN(!): 39 [TA]   1800 Creatinine(!): 1.47 [TA]   1800 ALT (SGPT): 35 [TA]   1800 AST (SGOT)(!): 43 [TA]   1800 Alkaline Phosphatase(!): 286 [TA]   1800 eGFR(!): 46.5 [TA]   1800 Troponin T: 0.013 [TA]   1800 Magnesium(!): 2.6 [TA]   1800 proBNP(!): 7,383.0 [TA]   1828 EKG interpreted by me.  Sinus rhythm.  Rate of 68.  Right bundle branch block.  Some artifact present.  eft anterior fascicular block.  Abnormal EKG [CG]    Glucose(!): 500 mg/dL (2+) [TA]      ED Course User Index  [CG] Chele Molina,   [TA] Ashutosh Charles, APRN                                           MDM  Number of Diagnoses or Management Options  Lower extremity edema  Shortness of breath  Diagnosis management comments: Patient is an 87-year-old male here today with lower extremity edema.  He does appear to be short of breath, but is not requiring any more supplemental oxygen.  Labs were obtained in the was found to have kidney function at his baseline, a normal troponin, and a magnesium of 7383.  No acute changes noted to his EKG, documented by Dr. Molina.  Provided the patient with a dose of Lasix  80 mg IV which caused a large amount of diuresis.  After this the patient was reevaluated and noted significant improvement in his breathing.  Discussed the patient with Dr. Elena at this time, and recommended doubling the patient's daily Bumex for 5 days and then having the patient follow-up with his primary provider for reevaluation.  Discussed the treatment plan with the patient and the daughter.  The daughter was very concerned about the vesicle to the left leg.  Instructed her and the patient not to manually remove the fluid from the vesicle, but if the vesicle was to pop on its own, recommend keeping the area covered to help collect drainage and prevent infection.  Ultimately wrapped the patient's leg per the family's request.  Instructed him on how to take the Bumex, and the patient has a scheduled follow-up with appointment with his primary provider next week.  Encouraged him to return to the ER for any new or worsening symptoms.       Amount and/or Complexity of Data Reviewed  Clinical lab tests: reviewed and ordered  Tests in the radiology section of CPT®: ordered and reviewed  Tests in the medicine section of CPT®: reviewed and ordered  Discussion of test results with the performing providers: yes  Discuss the patient with other providers: yes    Patient Progress  Patient progress: stable      Final diagnoses:   Shortness of breath   Lower extremity edema       ED Disposition  ED Disposition     ED Disposition   Discharge    Condition   Stable    Comment   --             David Em MD  [address] [place] [address] [phone]    On 12/1/2022           Medication List      Changed    fluticasone 50 MCG/ACT nasal spray  Commonly known as: Flonase  2 sprays into the nostril(s) as directed by provider Daily. 2 puffs each nostril  What changed:   · when to take this  · reasons to take this  · additional instructions     Lantus 100 UNIT/ML injection  Generic drug: insulin glargine  Up  to 50 units daily  What changed:   · how much to take  · how to take this  · when to take this             Ashutosh Charles, APRN  11/24/22 9181

## 2022-11-23 NOTE — OUTREACH NOTE
CHF Week 3 Survey    Flowsheet Row Responses   [facility] patient discharged from? [facility]   Does the patient have one of the following disease processes/diagnoses(primary or secondary)? CHF   Week 3 attempt successful? Yes   Call start time 1135   Call end time 1139   Person spoke with today (if not patient) and relationship Susana J Luis-GARRET   Meds reviewed with patient/caregiver? Yes   Is the patient having any side effects they believe may be caused by any medication additions or changes? No   Does the patient have all medications ordered at discharge? Yes   Is the patient taking all medications as directed (includes completed medication regime)? Yes   Medication comments Daughter  lasix dosage has been lowered by PCP.   Comments regarding appointments Daughter states next PCP appt 12/01/22.   Does the patient have a primary care provider?  Yes   Does the patient have an appointment with their PCP within 7 days of discharge? Yes   Has the patient kept scheduled appointments due by today? Yes   Has home health visited the patient within 72 hours of discharge? Yes   Psychosocial issues? No   What is the patient's perception of their health status since discharge? Improving   CHF Week 3 call completed? Yes   Is the patient interested in additional calls from an ambulatory ?  NOTE:  applies to high risk patients requiring additional follow-up. No   Would this patient benefit from a Referral to [facility] Social Work? No   Wrap up additional comments Brief call with daughter- patient is improving now. States has lost some fluid weight, decreased edema. States SOA has continued, but has improved. Denies any needs today. Next PCP f/u appt is next week.   Call end time 1139          SHIRIN ALSTON - Registered Nurse

## 2022-11-24 NOTE — DISCHARGE INSTRUCTIONS
Starting tomorrow take a whole Bumetanide for 5 days and then go back to taking a 1/2 tablet. Follow-up with your PCP for a reevaluation of the swelling. Do not pop the blister, but keep covered if it does pop on its own.

## 2022-11-27 ENCOUNTER — HOSPITAL ENCOUNTER (EMERGENCY)
Facility: HOSPITAL | Age: 87
Discharge: HOME | End: 2022-11-27
Attending: EMERGENCY MEDICINE | Admitting: EMERGENCY MEDICINE

## 2022-11-27 ENCOUNTER — APPOINTMENT (OUTPATIENT)
Dept: GENERAL RADIOLOGY | Facility: HOSPITAL | Age: 87
End: 2022-11-27

## 2022-11-27 VITALS
RESPIRATION RATE: 16 BRPM | HEIGHT: 66 IN | BODY MASS INDEX: 32.47 KG/M2 | WEIGHT: 202 LBS | TEMPERATURE: 99.1 F | OXYGEN SATURATION: 96 % | HEART RATE: 77 BPM | DIASTOLIC BLOOD PRESSURE: 60 MMHG | SYSTOLIC BLOOD PRESSURE: 122 MMHG

## 2022-11-27 DIAGNOSIS — L03.116 CELLULITIS OF LEFT LOWER EXTREMITY: ICD-10-CM

## 2022-11-27 DIAGNOSIS — T14.8XXA BLISTER: Primary | ICD-10-CM

## 2022-11-27 LAB
ALBUMIN SERPL-MCNC: 3.9 G/DL (ref 3.5–5.2)
ALBUMIN/GLOB SERPL: 1.3 G/DL
ALP SERPL-CCNC: 284 U/L (ref 39–117)
ALT SERPL W P-5'-P-CCNC: 27 U/L (ref 1–41)
ANION GAP SERPL CALCULATED.3IONS-SCNC: 10 MMOL/L (ref 5–15)
AST SERPL-CCNC: 28 U/L (ref 1–40)
BASOPHILS # BLD AUTO: 0.18 10*3/MM3 (ref 0–0.2)
BASOPHILS NFR BLD AUTO: 1.8 % (ref 0–1.5)
BILIRUB SERPL-MCNC: 1.1 MG/DL (ref 0–1.2)
BUN SERPL-MCNC: 40 MG/DL (ref 8–23)
BUN/CREAT SERPL: 23.1 (ref 7–25)
CALCIUM SPEC-SCNC: 8.6 MG/DL (ref 8.6–10.5)
CHLORIDE SERPL-SCNC: 100 MMOL/L (ref 98–107)
CO2 SERPL-SCNC: 30 MMOL/L (ref 22–29)
CREAT SERPL-MCNC: 1.73 MG/DL (ref 0.76–1.27)
CRP SERPL-MCNC: 0.47 MG/DL (ref 0–0.5)
DEPRECATED RDW RBC AUTO: 58.2 FL (ref 37–54)
EGFRCR SERPLBLD CKD-EPI 2021: 38.2 ML/MIN/1.73
EOSINOPHIL # BLD AUTO: 0.23 10*3/MM3 (ref 0–0.4)
EOSINOPHIL NFR BLD AUTO: 2.3 % (ref 0.3–6.2)
ERYTHROCYTE [DISTWIDTH] IN BLOOD BY AUTOMATED COUNT: 17.4 % (ref 12.3–15.4)
ERYTHROCYTE [SEDIMENTATION RATE] IN BLOOD: 16 MM/HR (ref 0–15)
GLOBULIN UR ELPH-MCNC: 3 GM/DL
GLUCOSE SERPL-MCNC: 226 MG/DL (ref 65–99)
HCT VFR BLD AUTO: 37.4 % (ref 37.5–51)
HGB BLD-MCNC: 11.8 G/DL (ref 13–17.7)
HOLD SPECIMEN: NORMAL
HOLD SPECIMEN: NORMAL
IMM GRANULOCYTES # BLD AUTO: 0.21 10*3/MM3 (ref 0–0.05)
IMM GRANULOCYTES NFR BLD AUTO: 2.1 % (ref 0–0.5)
LYMPHOCYTES # BLD AUTO: 0.82 10*3/MM3 (ref 0.7–3.1)
LYMPHOCYTES NFR BLD AUTO: 8.3 % (ref 19.6–45.3)
MCH RBC QN AUTO: 28.8 PG (ref 26.6–33)
MCHC RBC AUTO-ENTMCNC: 31.6 G/DL (ref 31.5–35.7)
MCV RBC AUTO: 91.2 FL (ref 79–97)
MONOCYTES # BLD AUTO: 0.83 10*3/MM3 (ref 0.1–0.9)
MONOCYTES NFR BLD AUTO: 8.4 % (ref 5–12)
NEUTROPHILS NFR BLD AUTO: 7.62 10*3/MM3 (ref 1.7–7)
NEUTROPHILS NFR BLD AUTO: 77.1 % (ref 42.7–76)
NRBC BLD AUTO-RTO: 0 /100 WBC (ref 0–0.2)
NT-PROBNP SERPL-MCNC: 7014 PG/ML (ref 0–1800)
PLATELET # BLD AUTO: 127 10*3/MM3 (ref 140–450)
PMV BLD AUTO: 11.2 FL (ref 6–12)
POTASSIUM SERPL-SCNC: 4.4 MMOL/L (ref 3.5–5.2)
PROT SERPL-MCNC: 6.9 G/DL (ref 6–8.5)
RBC # BLD AUTO: 4.1 10*6/MM3 (ref 4.14–5.8)
SODIUM SERPL-SCNC: 140 MMOL/L (ref 136–145)
WBC NRBC COR # BLD: 9.89 10*3/MM3 (ref 3.4–10.8)
WHOLE BLOOD HOLD COAG: NORMAL
WHOLE BLOOD HOLD SPECIMEN: NORMAL

## 2022-11-27 PROCEDURE — 86140 C-REACTIVE PROTEIN: CPT | Performed by: PHYSICIAN ASSISTANT

## 2022-11-27 PROCEDURE — 25010000002 ONDANSETRON PER 1 MG: Performed by: EMERGENCY MEDICINE

## 2022-11-27 PROCEDURE — 83880 ASSAY OF NATRIURETIC PEPTIDE: CPT | Performed by: PHYSICIAN ASSISTANT

## 2022-11-27 PROCEDURE — 96374 THER/PROPH/DIAG INJ IV PUSH: CPT

## 2022-11-27 PROCEDURE — 80053 COMPREHEN METABOLIC PANEL: CPT | Performed by: PHYSICIAN ASSISTANT

## 2022-11-27 PROCEDURE — 96375 TX/PRO/DX INJ NEW DRUG ADDON: CPT

## 2022-11-27 PROCEDURE — 85651 RBC SED RATE NONAUTOMATED: CPT | Performed by: PHYSICIAN ASSISTANT

## 2022-11-27 PROCEDURE — 25010000002 MORPHINE PER 10 MG: Performed by: EMERGENCY MEDICINE

## 2022-11-27 PROCEDURE — 73590 X-RAY EXAM OF LOWER LEG: CPT

## 2022-11-27 PROCEDURE — 85025 COMPLETE CBC W/AUTO DIFF WBC: CPT | Performed by: PHYSICIAN ASSISTANT

## 2022-11-27 PROCEDURE — 99284 EMERGENCY DEPT VISIT MOD MDM: CPT

## 2022-11-27 RX ORDER — SODIUM CHLORIDE 0.9 % (FLUSH) 0.9 %
10 SYRINGE (ML) INJECTION AS NEEDED
Status: DISCONTINUED | OUTPATIENT
Start: 2022-11-27 | End: 2022-11-27 | Stop reason: HOSPADM

## 2022-11-27 RX ORDER — CLINDAMYCIN HYDROCHLORIDE 150 MG/1
450 CAPSULE ORAL 3 TIMES DAILY
Qty: 63 CAPSULE | Refills: 0 | Status: SHIPPED | OUTPATIENT
Start: 2022-11-27 | End: 2022-12-04

## 2022-11-27 RX ORDER — ONDANSETRON 4 MG/1
4 TABLET, ORALLY DISINTEGRATING ORAL EVERY 8 HOURS PRN
Qty: 12 TABLET | Refills: 0 | Status: SHIPPED | OUTPATIENT
Start: 2022-11-27 | End: 2022-12-29

## 2022-11-27 RX ORDER — CLINDAMYCIN HYDROCHLORIDE 150 MG/1
450 CAPSULE ORAL ONCE
Status: COMPLETED | OUTPATIENT
Start: 2022-11-27 | End: 2022-11-27

## 2022-11-27 RX ORDER — ONDANSETRON 2 MG/ML
4 INJECTION INTRAMUSCULAR; INTRAVENOUS ONCE
Status: COMPLETED | OUTPATIENT
Start: 2022-11-27 | End: 2022-11-27

## 2022-11-27 RX ORDER — HYDROCODONE BITARTRATE AND ACETAMINOPHEN 5; 325 MG/1; MG/1
1 TABLET ORAL EVERY 6 HOURS PRN
Qty: 10 TABLET | Refills: 0 | Status: SHIPPED | OUTPATIENT
Start: 2022-11-27 | End: 2022-12-29

## 2022-11-27 RX ORDER — MORPHINE SULFATE 2 MG/ML
2 INJECTION, SOLUTION INTRAMUSCULAR; INTRAVENOUS ONCE
Status: COMPLETED | OUTPATIENT
Start: 2022-11-27 | End: 2022-11-27

## 2022-11-27 RX ADMIN — CLINDAMYCIN HYDROCHLORIDE 450 MG: 150 CAPSULE ORAL at 21:34

## 2022-11-27 RX ADMIN — ONDANSETRON 4 MG: 2 INJECTION INTRAMUSCULAR; INTRAVENOUS at 20:18

## 2022-11-27 RX ADMIN — MUPIROCIN 1 APPLICATION: 20 OINTMENT TOPICAL at 21:34

## 2022-11-27 RX ADMIN — MORPHINE SULFATE 2 MG: 2 INJECTION, SOLUTION INTRAMUSCULAR; INTRAVENOUS at 20:18

## 2022-11-28 ENCOUNTER — TELEPHONE (OUTPATIENT)
Dept: INTERNAL MEDICINE | Facility: CLINIC | Age: 87
End: 2022-11-28

## 2022-11-28 NOTE — TELEPHONE ENCOUNTER
Left detailed voice mail for Susana letting her know that unfortunately home health will not be able to come out everyday - it is a insurance thing. Requested a call back to discuss in detail

## 2022-11-28 NOTE — TELEPHONE ENCOUNTER
I spoke with Marcus - [facility]ce[facility] will only authorize two visits a week. They discussed this with Susana at the last home visit. Susana asked the home health nurse about hiring someone and paying out of pocket. That is an option for a non agency person, but she can not hire another agency to come in when [facility] is in twice a week billing the insurance

## 2022-11-28 NOTE — TELEPHONE ENCOUNTER
Caller: Keyla Dietz    Relationship: Emergency Contact    Best call back number: [phone]    What was the call regarding:   PATIENT'S (POA) KEYLA STATED THAT SHE WOULD LIKE A CALL BACK REGARDING PATIENT'S WOUND ON HIS LEG THAT HAS TO HAVE THE DRESSING CHANGED EVERYDAY AND PATIENT WILL NOT LET KEYLA CHANGE THE DRESSING AND PATIENT HAS HOME HEALTH BUT THEY ONLY COME OUT TWO TIMES A WEEK. KEYLA STATED THAT SHE IS CONCERNED SINCE PATIENT'S WOUND LOOKS BAD AND INFECTED AND WOULD LIKE TO BE INFORMED IF THERE ANYWAY HOME HEALTH CAN COME TO THE HOUSE EVERYDAY TO CHANGE PATIENT'S WOUND DRESSING     Do you require a callback: YES

## 2022-11-29 ENCOUNTER — TELEPHONE (OUTPATIENT)
Dept: INTERNAL MEDICINE | Facility: CLINIC | Age: 87
End: 2022-11-29

## 2022-11-29 NOTE — TELEPHONE ENCOUNTER
Spoke with [facility].  Advised that patient is being seen by [facility] twice weekly and cannot use another agency.

## 2022-11-29 NOTE — TELEPHONE ENCOUNTER
PT CALLED STATING AN ORDER NEEDS TO BE PUT IN TO [facility] TO HAVE HIS DRESSING CHANGED.   PHONE #[phone]

## 2022-12-01 ENCOUNTER — OFFICE VISIT (OUTPATIENT)
Dept: INTERNAL MEDICINE | Facility: CLINIC | Age: 87
End: 2022-12-01

## 2022-12-01 VITALS
BODY MASS INDEX: 32.3 KG/M2 | WEIGHT: 201 LBS | HEIGHT: 66 IN | HEART RATE: 62 BPM | SYSTOLIC BLOOD PRESSURE: 118 MMHG | TEMPERATURE: 97.8 F | OXYGEN SATURATION: 98 % | DIASTOLIC BLOOD PRESSURE: 58 MMHG

## 2022-12-01 DIAGNOSIS — I50.9 ACUTE ON CHRONIC CONGESTIVE HEART FAILURE, UNSPECIFIED HEART FAILURE TYPE: ICD-10-CM

## 2022-12-01 DIAGNOSIS — L97.921 ULCER OF LEFT LOWER EXTREMITY, LIMITED TO BREAKDOWN OF SKIN: Primary | ICD-10-CM

## 2022-12-01 DIAGNOSIS — E11.65 UNCONTROLLED TYPE 2 DIABETES MELLITUS WITH HYPERGLYCEMIA: ICD-10-CM

## 2022-12-01 PROCEDURE — 99214 OFFICE O/P EST MOD 30 MIN: CPT | Performed by: INTERNAL MEDICINE

## 2022-12-01 RX ORDER — BUMETANIDE 2 MG/1
2 TABLET ORAL DAILY
Qty: 30 TABLET | Refills: 1
Start: 2022-12-01 | End: 2022-12-29 | Stop reason: SDUPTHER

## 2022-12-01 RX ORDER — MUPIROCIN CALCIUM 20 MG/G
1 CREAM TOPICAL 2 TIMES DAILY
Qty: 30 G | Refills: 1 | Status: SHIPPED | OUTPATIENT
Start: 2022-12-01 | End: 2022-12-07 | Stop reason: SDUPTHER

## 2022-12-01 NOTE — PROGRESS NOTES
"Subjective  Blane Dietz is a 87 y.o. male    HPI coming in for follow-up patient with a left leg wound which started as a blister.  Has had chronic lower extremity edema from his history of CHF.  Has not been very compliant with his diabetes medication and has suboptimal blood sugar control.  He was seen at urgent care where the blisters seem to have ruptured and is now had some wound dressing over it.  He denies any fever or chills he has been given clindamycin orally he is using a topical antibiotic cream.    The following portions of the patient's history were reviewed and updated as appropriate: allergies, current medications, past family history, past medical history, past social history, past surgical history, and problem list.     Review of Systems   Constitutional: Positive for fatigue. Negative for activity change, appetite change and fever.   HENT: Negative for congestion, ear discharge, ear pain and trouble swallowing.    Eyes: Negative for photophobia and visual disturbance.   Respiratory: Negative for cough and shortness of breath.    Cardiovascular: Positive for leg swelling. Negative for chest pain and palpitations.   Gastrointestinal: Negative for abdominal distention, abdominal pain, constipation, diarrhea, nausea and vomiting.   Endocrine: Negative.    Genitourinary: Negative for dysuria, hematuria and urgency.   Musculoskeletal: Negative for arthralgias, back pain, joint swelling and myalgias.   Skin: Positive for wound. Negative for color change and rash.   Allergic/Immunologic: Negative.    Neurological: Negative for dizziness, weakness, light-headedness and headaches.   Hematological: Negative for adenopathy. Does not bruise/bleed easily.   Psychiatric/Behavioral: Negative for agitation, confusion and dysphoric mood. The patient is not nervous/anxious.        Visit Vitals  /58   Pulse 62   Temp 97.8 °F (36.6 °C) (Infrared)   Ht 167.6 cm (66\")   Wt 91.2 kg (201 lb)   SpO2 98%   BMI " 32.44 kg/m²       Objective  Physical Exam  Constitutional:       General: He is not in acute distress.     Appearance: He is well-developed.   HENT:      Nose: Nose normal.   Eyes:      General: No scleral icterus.     Conjunctiva/sclera: Conjunctivae normal.   Neck:      Thyroid: No thyromegaly.      Trachea: No tracheal deviation.   Cardiovascular:      Rate and Rhythm: Normal rate and regular rhythm.      Heart sounds: No murmur heard.    No friction rub.   Pulmonary:      Effort: No respiratory distress.      Breath sounds: No wheezing or rales.   Abdominal:      General: There is no distension.      Palpations: Abdomen is soft. There is no mass.      Tenderness: There is no abdominal tenderness. There is no guarding.   Musculoskeletal:         General: No deformity. Normal range of motion.   Lymphadenopathy:      Cervical: No cervical adenopathy.   Skin:     General: Skin is warm and dry.      Findings: Erythema present. No rash.             Comments: Superficial ulcer with healthy granulation tissue   Neurological:      Mental Status: He is alert and oriented to person, place, and time.      Cranial Nerves: No cranial nerve deficit.      Coordination: Coordination normal.      Deep Tendon Reflexes: Reflexes are normal and symmetric.   Psychiatric:         Behavior: Behavior normal.         Thought Content: Thought content normal.         Judgment: Judgment normal.         Diagnoses and all orders for this visit:    Ulcer of left lower extremity, limited to breakdown of skin (HCC) Will use topical Bactroban.  Discussed local wound care has home health helping him with this to    Uncontrolled type 2 diabetes mellitus with hyperglycemia (HCC) urged compliance with his medications.  Needs aggressive blood sugar control    Acute on chronic congestive heart failure, unspecified heart failure type (HCC) on Demadex discussed leg elevation salt restriction would like to avoid excessive leg edema and at the same time  avoid intravascular volume depletion    Other orders  -     mupirocin (Bactroban) 2 % cream; Apply 1 application topically to the appropriate area as directed 2 (Two) Times a Day.

## 2022-12-02 ENCOUNTER — TELEPHONE (OUTPATIENT)
Dept: INTERNAL MEDICINE | Facility: CLINIC | Age: 87
End: 2022-12-02

## 2022-12-02 NOTE — TELEPHONE ENCOUNTER
Discussed with daughter.  She will be checking with Dr. Toussaint on Monday when they have an appointment with him

## 2022-12-02 NOTE — TELEPHONE ENCOUNTER
Caller: Susana Dietz    Relationship: Emergency Contact    Best call back number: [phone]    Requested Prescriptions:  AMIODARONE  MG 2X A DAY  TAKE 1 EVERY 12 HOURS     Pharmacy where request should be sent: [facility] [id number] [place] [id number] AT [facility] - [phone]  - [phone] FX     Additional details provided by patient: THE PATIENT'S DAUGHTER STATES THAT THIS WAS PRESCRIBED BY THE HOSPITAL. PLEASE REFILL OR CALL TO ADVISE.     Does the patient have less than a 3 day supply:  [] Yes  [x] No    Would you like a call back once the refill request has been completed: [] Yes [x] No     If the office needs to give you a call back, can they leave a voicemail: [] Yes [x] No    Cruz Hinton Rep   12/02/22 10:20 EST     THANK YOU.

## 2022-12-07 ENCOUNTER — TELEPHONE (OUTPATIENT)
Dept: INTERNAL MEDICINE | Facility: CLINIC | Age: 87
End: 2022-12-07

## 2022-12-07 ENCOUNTER — TELEPHONE (OUTPATIENT)
Dept: URGENT CARE | Facility: CLINIC | Age: 87
End: 2022-12-07

## 2022-12-07 DIAGNOSIS — L03.116 CELLULITIS OF LEFT LOWER EXTREMITY: Primary | ICD-10-CM

## 2022-12-07 RX ORDER — MUPIROCIN CALCIUM 20 MG/G
1 CREAM TOPICAL 2 TIMES DAILY
Qty: 30 G | Refills: 1 | Status: SHIPPED | OUTPATIENT
Start: 2022-12-07 | End: 2023-03-30

## 2022-12-07 RX ORDER — DOXYCYCLINE 100 MG/1
100 CAPSULE ORAL 2 TIMES DAILY
Qty: 14 CAPSULE | Refills: 0 | Status: SHIPPED | OUTPATIENT
Start: 2022-12-07 | End: 2022-12-14

## 2022-12-07 RX ORDER — CIPROFLOXACIN 500 MG/1
TABLET, FILM COATED ORAL
Qty: 14 TABLET | Refills: 0 | Status: SHIPPED | OUTPATIENT
Start: 2022-12-07 | End: 2022-12-07

## 2022-12-07 NOTE — TELEPHONE ENCOUNTER
Caller: Keyla Dietz VERO    Relationship: Emergency Contact    Best call back number: [phone]    Requested Prescriptions:   Requested Prescriptions     Pending Prescriptions Disp Refills   • mupirocin (Bactroban) 2 % cream 30 g 1     Sig: Apply 1 application topically to the appropriate area as directed 2 (Two) Times a Day.        Pharmacy where request should be sent: [facility] [id number] [phone] [facility] [phone] FX     Additional details provided by patient:   PATIENT'S (POA) KEYLA STATED THAT PATIENT IS STILL USING THIS MEDICATION THAT WAS PRESCRIBED BY THE HOSPITAL AND IS NEEDING A REFILL BUT WAS INFORMED BY THE PHARMACY THAT THIS MEDICATION HAS TO BE CALLED IN AS A OINTMENT INSTEAD OF A CREAM SINCE INSURANCE WILL NOT COVER THE COST OF THE CREAM AND ITS $238 OUT OF POCKET BUT WILL COVER THE COST OF THE OINTMENT     Does the patient have less than a 3 day supply:  [x] Yes  [] No    Would you like a call back once the refill request has been completed: [] Yes [x] No    If the office needs to give you a call back, can they leave a voicemail: [] Yes [x] No    Cruz Cardona Rep   12/07/22 11:52 EST

## 2022-12-07 NOTE — TELEPHONE ENCOUNTER
Pharmacy called regarding drug interaction with Cipro and Pacerone which prolongs QT intervals.  Discontinued Cipro and start doxycycline for culture that grew staph.

## 2022-12-07 NOTE — TELEPHONE ENCOUNTER
Caller: [facility] [id number] - [facility] [address] AT [facility] - [phone]  - [phone] FX    Relationship: Pharmacy    Best call back number: [phone]    Requested Prescriptions:   Requested Prescriptions      No prescriptions requested or ordered in this encounter      mupirocin (Bactroban) 2 % cream    Pharmacy where request should be sent:      INSURANCE WILL NOT PAY FOR THE CREAM.  THEY WILL PAY FOR OINTMENT   CREAM IS EXPENSIVE.       Cruz Morales Rep   12/07/22 14:29 EST

## 2022-12-07 NOTE — TELEPHONE ENCOUNTER
Rx Refill Note  Requested Prescriptions     Pending Prescriptions Disp Refills   • mupirocin (Bactroban) 2 % cream 30 g 1     Sig: Apply 1 application topically to the appropriate area as directed 2 (Two) Times a Day.      Last office visit with prescribing clinician: 12/1/2022     Next office visit with prescribing clinician: 12/29/2022                         Would you like a call back once the refill request has been completed: [] Yes [] No    If the office needs to give you a call back, can they leave a voicemail: [] Yes [] No    Jeniffer Rossi MA  12/07/22, 12:08 EST       PATIENT'S (POA) KEYLA STATED THAT PATIENT IS STILL USING THIS MEDICATION THAT WAS PRESCRIBED BY THE HOSPITAL AND IS NEEDING A REFILL BUT WAS INFORMED BY THE PHARMACY THAT THIS MEDICATION HAS TO BE CALLED IN AS A OINTMENT INSTEAD OF A CREAM SINCE INSURANCE WILL NOT COVER THE COST OF THE CREAM AND ITS $238 OUT OF POCKET BUT WILL COVER THE COST OF THE OINTMENT

## 2022-12-29 ENCOUNTER — OFFICE VISIT (OUTPATIENT)
Dept: INTERNAL MEDICINE | Facility: CLINIC | Age: 87
End: 2022-12-29

## 2022-12-29 VITALS
DIASTOLIC BLOOD PRESSURE: 58 MMHG | BODY MASS INDEX: 29.89 KG/M2 | HEIGHT: 66 IN | HEART RATE: 56 BPM | WEIGHT: 186 LBS | SYSTOLIC BLOOD PRESSURE: 110 MMHG | TEMPERATURE: 97.7 F | OXYGEN SATURATION: 97 %

## 2022-12-29 DIAGNOSIS — E11.65 UNCONTROLLED TYPE 2 DIABETES MELLITUS WITH HYPERGLYCEMIA: ICD-10-CM

## 2022-12-29 DIAGNOSIS — I10 ESSENTIAL HYPERTENSION: ICD-10-CM

## 2022-12-29 DIAGNOSIS — N18.32 STAGE 3B CHRONIC KIDNEY DISEASE: Primary | ICD-10-CM

## 2022-12-29 DIAGNOSIS — R53.83 OTHER FATIGUE: ICD-10-CM

## 2022-12-29 PROCEDURE — 99214 OFFICE O/P EST MOD 30 MIN: CPT | Performed by: INTERNAL MEDICINE

## 2022-12-29 RX ORDER — AMIODARONE HYDROCHLORIDE 200 MG/1
200 TABLET ORAL DAILY
COMMUNITY
Start: 2022-12-05

## 2022-12-29 RX ORDER — ALLOPURINOL 100 MG/1
100 TABLET ORAL DAILY
COMMUNITY
End: 2022-12-29 | Stop reason: SDUPTHER

## 2022-12-29 RX ORDER — ALLOPURINOL 100 MG/1
100 TABLET ORAL DAILY
Qty: 90 TABLET | Refills: 3 | Status: SHIPPED | OUTPATIENT
Start: 2022-12-29

## 2022-12-29 RX ORDER — BUMETANIDE 2 MG/1
2 TABLET ORAL DAILY
Qty: 30 TABLET | Refills: 1 | Status: SHIPPED | OUTPATIENT
Start: 2022-12-29

## 2022-12-29 NOTE — PROGRESS NOTES
"Subjective  Blane Dietz is a 87 y.o. male    HPI coming in for follow-up patient with a history of diabetes and congestive heart failure with significant lower extremity edema and leg wounds.  These have improved significantly with diuretic therapy wound care and compression stockings.  He is coming in here today for follow-up has been ambulating more now has been more compliant with his diet    The following portions of the patient's history were reviewed and updated as appropriate: allergies, current medications, past family history, past medical history, past social history, past surgical history, and problem list.     Review of Systems   Constitutional: Positive for fatigue. Negative for activity change, appetite change and fever.   HENT: Negative for congestion, ear discharge, ear pain and trouble swallowing.    Eyes: Negative for photophobia and visual disturbance.   Respiratory: Negative for cough and shortness of breath.    Cardiovascular: Negative for chest pain and palpitations.   Gastrointestinal: Negative for abdominal distention, abdominal pain, constipation, diarrhea, nausea and vomiting.   Endocrine: Negative.    Genitourinary: Negative for dysuria, hematuria and urgency.   Musculoskeletal: Positive for gait problem. Negative for arthralgias, back pain, joint swelling and myalgias.   Skin: Positive for wound. Negative for color change and rash.   Allergic/Immunologic: Negative.    Neurological: Negative for dizziness, weakness, light-headedness and headaches.   Hematological: Negative for adenopathy. Does not bruise/bleed easily.   Psychiatric/Behavioral: Negative for agitation, confusion and dysphoric mood. The patient is not nervous/anxious.        Visit Vitals  /58   Pulse 56   Temp 97.7 °F (36.5 °C) (Infrared)   Ht 167.6 cm (66\")   Wt 84.4 kg (186 lb)   SpO2 97%   BMI 30.02 kg/m²       Objective  Physical Exam  Constitutional:       General: He is not in acute distress.     Appearance: He " is well-developed.   HENT:      Nose: Nose normal.   Eyes:      General: No scleral icterus.     Conjunctiva/sclera: Conjunctivae normal.   Neck:      Thyroid: No thyromegaly.      Trachea: No tracheal deviation.   Cardiovascular:      Rate and Rhythm: Normal rate and regular rhythm.      Heart sounds: No murmur heard.    No friction rub.   Pulmonary:      Effort: No respiratory distress.      Breath sounds: No wheezing or rales.   Abdominal:      General: There is no distension.      Palpations: Abdomen is soft. There is no mass.      Tenderness: There is no abdominal tenderness. There is no guarding.   Musculoskeletal:         General: Deformity present. Normal range of motion.      Right lower leg: No edema.      Left lower leg: No edema.   Lymphadenopathy:      Cervical: No cervical adenopathy.   Skin:     General: Skin is warm and dry.      Findings: No erythema or rash.   Neurological:      Mental Status: He is alert and oriented to person, place, and time.      Cranial Nerves: No cranial nerve deficit.      Coordination: Coordination normal.      Deep Tendon Reflexes: Reflexes are normal and symmetric.   Psychiatric:         Behavior: Behavior normal.         Thought Content: Thought content normal.         Judgment: Judgment normal.         Diagnoses and all orders for this visit:    Stage 3b chronic kidney disease (HCC) repeat BMP.  On high-dose diuretic therapy may need to adjust dosing now that his symptoms are better controlled    Uncontrolled type 2 diabetes mellitus with hyperglycemia (HCC) continue insulin regimen he is more compliant with his diet follow A1c he has an appointment with his endocrinologist next month    Essential hypertension stable with current meds and low-salt diet    Other orders  -     amiodarone (PACERONE) 200 MG tablet; Take 200 mg by mouth Daily.  -     Discontinue: allopurinol (ZYLOPRIM) 100 MG tablet; Take 100 mg by mouth Daily.  -     bumetanide (BUMEX) 2 MG tablet; Take 1  tablet by mouth Daily.  -     allopurinol (ZYLOPRIM) 100 MG tablet; Take 1 tablet by mouth Daily.

## 2022-12-30 LAB
ALBUMIN/CREAT UR: 16 MG/G CREAT (ref 0–29)
BUN SERPL-MCNC: 73 MG/DL (ref 8–27)
BUN/CREAT SERPL: 30 (ref 10–24)
CALCIUM SERPL-MCNC: 9.4 MG/DL (ref 8.6–10.2)
CHLORIDE SERPL-SCNC: 96 MMOL/L (ref 96–106)
CO2 SERPL-SCNC: 22 MMOL/L (ref 20–29)
CREAT SERPL-MCNC: 2.43 MG/DL (ref 0.76–1.27)
CREAT UR-MCNC: 72.7 MG/DL
EGFRCR SERPLBLD CKD-EPI 2021: 25 ML/MIN/1.73
GLUCOSE SERPL-MCNC: 221 MG/DL (ref 70–99)
MICROALBUMIN UR-MCNC: 11.7 UG/ML
POTASSIUM SERPL-SCNC: 5.1 MMOL/L (ref 3.5–5.2)
SODIUM SERPL-SCNC: 136 MMOL/L (ref 134–144)
TSH SERPL DL<=0.005 MIU/L-ACNC: 1.94 UIU/ML (ref 0.45–4.5)

## 2023-01-06 ENCOUNTER — OUTSIDE FACILITY SERVICE (OUTPATIENT)
Dept: INTERNAL MEDICINE | Facility: CLINIC | Age: 88
End: 2023-01-06
Payer: MEDICARE

## 2023-01-06 PROCEDURE — G0179 MD RECERTIFICATION HHA PT: HCPCS | Performed by: INTERNAL MEDICINE

## 2023-01-19 ENCOUNTER — OFFICE VISIT (OUTPATIENT)
Dept: ENDOCRINOLOGY | Facility: CLINIC | Age: 88
End: 2023-01-19
Payer: MEDICARE

## 2023-01-19 VITALS
HEIGHT: 66 IN | HEART RATE: 64 BPM | OXYGEN SATURATION: 97 % | DIASTOLIC BLOOD PRESSURE: 62 MMHG | SYSTOLIC BLOOD PRESSURE: 114 MMHG | WEIGHT: 180 LBS | BODY MASS INDEX: 28.93 KG/M2

## 2023-01-19 DIAGNOSIS — E11.65 UNCONTROLLED TYPE 2 DIABETES MELLITUS WITH HYPERGLYCEMIA: Primary | ICD-10-CM

## 2023-01-19 LAB
EXPIRATION DATE: ABNORMAL
EXPIRATION DATE: NORMAL
GLUCOSE BLDC GLUCOMTR-MCNC: 205 MG/DL (ref 70–130)
HBA1C MFR BLD: 8.6 %
Lab: ABNORMAL
Lab: NORMAL

## 2023-01-19 PROCEDURE — 3052F HG A1C>EQUAL 8.0%<EQUAL 9.0%: CPT | Performed by: INTERNAL MEDICINE

## 2023-01-19 PROCEDURE — 99213 OFFICE O/P EST LOW 20 MIN: CPT | Performed by: INTERNAL MEDICINE

## 2023-01-19 PROCEDURE — 82947 ASSAY GLUCOSE BLOOD QUANT: CPT | Performed by: INTERNAL MEDICINE

## 2023-01-19 PROCEDURE — 83036 HEMOGLOBIN GLYCOSYLATED A1C: CPT | Performed by: INTERNAL MEDICINE

## 2023-01-19 RX ORDER — DULAGLUTIDE 1.5 MG/.5ML
1.5 INJECTION, SOLUTION SUBCUTANEOUS WEEKLY
Qty: 7 ML | Refills: 2 | Status: SHIPPED | OUTPATIENT
Start: 2023-01-19

## 2023-01-19 RX ORDER — EMPAGLIFLOZIN 10 MG/1
10 TABLET, FILM COATED ORAL DAILY
Qty: 90 TABLET | Refills: 3 | Status: SHIPPED | OUTPATIENT
Start: 2023-01-19

## 2023-01-19 RX ORDER — INSULIN GLARGINE 100 [IU]/ML
15 INJECTION, SOLUTION SUBCUTANEOUS NIGHTLY
Qty: 15 ML | Refills: 3 | Status: SHIPPED | OUTPATIENT
Start: 2023-01-19

## 2023-01-19 NOTE — PROGRESS NOTES
December 8, 2017      Celine Leine  [address] [place] [facility] [place] [address]        Dear MsNida Quiquene ,    Recently, you had a Pap smear done at your [facility] visit. I am pleased to report that your Pap smear test result is normal.    A Pap smear should be done on a regular basis because it can help determine if there are pre-cancerous or cancerous cells in the cervix.    Recent recommendations by the American College of Obstetricians and Gynecologists have advised that even though annual gynecologic examinations are still recommended, Pap smears need not always be done on an annual basis. The recommendations for Pap smear testing vary according to the patient’s age, history of Pap results and other factors.    At your next annual exam, you and I can discuss whether collecting a Pap smear at that time is appropriate for you.    Please call my office if you have any questions regarding the information or results in this letter or if you are uncertain about when to schedule your next annual exam and Pap smear.    Sincerely,    Francia Jarvis MD  Obstetrics and Gynecology  [address] [place] [address]   [phone]           "     Office Note      Date: 2023  Patient Name: Blane Dietz  MRN: [id number]  : 1935    Chief Complaint   Patient presents with   • Diabetes       History of Present Illness:   Blane Dietz is a 87 y.o. male who presents for Diabetes type 2.   Current RX lantus/ jardiance/ trulicity. Had to stop metformin due to a decline of EGFR     He is running into the  Situation where when he takes enough diuretic to get rid of his fluid, he develops worsening kidney funciton but if he does not , he is in CHF     Bg checks are done:rarely   Hypoglycemia :none  Last reading  Was 117      Last A1c:  Hemoglobin A1C   Date Value Ref Range Status   2023 8.6 % Final   10/25/2022 8.60 (H) 4.80 - 5.60 % Final       Changes in health since last visit: see above . Last eye exam up to date  .    Subjective              Review of Systems:   Review of Systems   Respiratory: Positive for chest tightness.    Cardiovascular: Positive for leg swelling.   Neurological: Positive for tremors.       The following portions of the patient's history were reviewed and updated as appropriate: allergies, current medications, past family history, past medical history, past social history, past surgical history and problem list.    Objective     Visit Vitals  /62   Pulse 64   Ht 167.6 cm (66\")   Wt 81.6 kg (180 lb)   SpO2 97%   BMI 29.05 kg/m²           Physical Exam:  Physical Exam  Vitals reviewed.   Constitutional:       Appearance: Normal appearance.   Neurological:      Mental Status: He is alert.   Psychiatric:         Mood and Affect: Mood normal.         Thought Content: Thought content normal.         Judgment: Judgment normal.          Assessment / Plan      Assessment & Plan:  Problem List Items Addressed This Visit        Other    Uncontrolled type 2 diabetes mellitus with hyperglycemia (HCC) - Primary    Overview     Impression: 2016 - seeing Endo .;          Current Assessment & Plan     a1c = 8.6  On " jardiance, insulin and trulicity  This might be the best we can do. If we increase the insulin he will have more lows. This average is at 198 and I am ok with that          Relevant Medications    Dulaglutide (Trulicity) 1.5 MG/0.5ML solution pen-injector    Lantus 100 UNIT/ML injection    Jardiance 10 MG tablet tablet    Other Relevant Orders    POC Glucose, Blood (Completed)    POC Glycosylated Hemoglobin (Hb A1C) (Completed)        Xavier Boston MD   01/19/2023

## 2023-01-19 NOTE — ASSESSMENT & PLAN NOTE
a1c = 8.6  On jardiance, insulin and trulicity  This might be the best we can do. If we increase the insulin he will have more lows. This average is at 198 and I am ok with that

## 2023-02-10 ENCOUNTER — OFFICE VISIT (OUTPATIENT)
Dept: INTERNAL MEDICINE | Facility: CLINIC | Age: 88
End: 2023-02-10
Payer: MEDICARE

## 2023-02-10 VITALS
OXYGEN SATURATION: 95 % | SYSTOLIC BLOOD PRESSURE: 122 MMHG | DIASTOLIC BLOOD PRESSURE: 42 MMHG | HEART RATE: 51 BPM | TEMPERATURE: 97.5 F | WEIGHT: 187 LBS | BODY MASS INDEX: 30.05 KG/M2 | HEIGHT: 66 IN

## 2023-02-10 DIAGNOSIS — E11.65 UNCONTROLLED TYPE 2 DIABETES MELLITUS WITH HYPERGLYCEMIA: ICD-10-CM

## 2023-02-10 DIAGNOSIS — S81.801A WOUND OF RIGHT LOWER EXTREMITY, INITIAL ENCOUNTER: ICD-10-CM

## 2023-02-10 DIAGNOSIS — N18.32 STAGE 3B CHRONIC KIDNEY DISEASE: Primary | ICD-10-CM

## 2023-02-10 PROBLEM — D64.9 NORMOCYTIC ANEMIA: Status: RESOLVED | Noted: 2022-06-03 | Resolved: 2023-02-10

## 2023-02-10 PROBLEM — N28.9 RENAL INSUFFICIENCY: Status: RESOLVED | Noted: 2021-12-08 | Resolved: 2023-02-10

## 2023-02-10 PROCEDURE — 99213 OFFICE O/P EST LOW 20 MIN: CPT | Performed by: INTERNAL MEDICINE

## 2023-02-10 PROCEDURE — 1125F AMNT PAIN NOTED PAIN PRSNT: CPT | Performed by: INTERNAL MEDICINE

## 2023-02-10 PROCEDURE — 99397 PER PM REEVAL EST PAT 65+ YR: CPT | Performed by: INTERNAL MEDICINE

## 2023-02-10 PROCEDURE — 96160 PT-FOCUSED HLTH RISK ASSMT: CPT | Performed by: INTERNAL MEDICINE

## 2023-02-10 PROCEDURE — 1170F FXNL STATUS ASSESSED: CPT | Performed by: INTERNAL MEDICINE

## 2023-02-10 PROCEDURE — 1159F MED LIST DOCD IN RCRD: CPT | Performed by: INTERNAL MEDICINE

## 2023-02-10 PROCEDURE — G0439 PPPS, SUBSEQ VISIT: HCPCS | Performed by: INTERNAL MEDICINE

## 2023-02-10 NOTE — PROGRESS NOTES
"The ABCs of the Annual Wellness Visit  Subsequent Medicare Wellness Visit    Subjective    Blane Dietz is a 87 y.o. male who presents for a Subsequent Medicare Wellness Visit.    The following portions of the patient's history were reviewed and   updated as appropriate: allergies, current medications, past family history, past medical history, past social history, past surgical history and problem list.    Compared to one year ago, the patient feels his physical   health is better.    Compared to one year ago, the patient feels his mental   health is better.    Recent Hospitalizations:  This patient has had a [facility] admission record on file within the last 365 days.    Current Medical Providers:  Patient Care Team:  David Em MD as PCP - General (Internal Medicine)  Ha Toussaint MD as Consulting Physician (Cardiology)  Shannan Ramon MD as Consulting Physician (Hematology and Oncology)  Xavier Boston MD as Consulting Physician (Endocrinology)    Outpatient Medications Prior to Visit   Medication Sig Dispense Refill   • allopurinol (ZYLOPRIM) 100 MG tablet Take 1 tablet by mouth Daily. 90 tablet 3   • amiodarone (PACERONE) 200 MG tablet Take 200 mg by mouth Daily.     • bumetanide (BUMEX) 2 MG tablet Take 1 tablet by mouth Daily. 30 tablet 1   • Dulaglutide (Trulicity) 1.5 MG/0.5ML solution pen-injector Inject 1.5 mg under the skin into the appropriate area as directed 1 (One) Time Per Week. Takes on Wednesdays 7 mL 2   • fluticasone (FLONASE) 50 MCG/ACT nasal spray 2 sprays into the nostril(s) as directed by provider Daily. 2 puffs each nostril (Patient taking differently: 2 sprays into the nostril(s) as directed by provider Daily As Needed for Rhinitis or Allergies.) 1 bottle 0   • Insulin Syringe-Needle U-100 (B-D INS SYRINGE 0.5CC/31GX5/16) 31G X 5/16\" 0.5 ML misc Use daily to give insulin 100 each 3   • Lantus 100 UNIT/ML injection Inject 15 Units under the skin into the " appropriate area as directed Every Night. Up to 50 units daily 15 mL 3   • pravastatin (PRAVACHOL) 40 MG tablet Take 1 tablet by mouth every night at bedtime. 90 tablet 3   • sacubitril-valsartan (ENTRESTO) 24-26 MG tablet Take 1 tablet by mouth Every 12 (Twelve) Hours. 180 tablet 3   • Jardiance 10 MG tablet tablet Take 1 tablet by mouth Daily. 90 tablet 3   • metoprolol succinate XL (TOPROL-XL) 25 MG 24 hr tablet Take 0.5 tablets by mouth Daily for 30 days. 15 tablet 1   • multivitamin with minerals tablet tablet Take 1 tablet by mouth Daily. AREDS 2     • mupirocin (Bactroban) 2 % cream Apply 1 application topically to the appropriate area as directed 2 (Two) Times a Day. 30 g 1     No facility-administered medications prior to visit.       No opioid medication identified on active medication list. I have reviewed chart for other potential  high risk medication/s and harmful drug interactions in the elderly.          Aspirin is not on active medication list.  Aspirin use is not indicated based on review of current medical condition/s. Risk of harm outweighs potential benefits.  .    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  ACP discussion was held with the patient during this visit. Patient has an advance directive in EMR which is still valid.      Objective    Vitals:    02/10/23 1349   BP: 122/42   Pulse: 51   Temp: 97.5 °F (36.4 °C)   SpO2: 95%   Weight: 84.8 kg (187 lb)  Comment: patient given   Height: 167.6 cm (65.98\")   PainSc:   6   PainLoc: Buttocks     Estimated body mass index is 30.2 kg/m² as calculated from the following:    Height as of this encounter: 167.6 cm (65.98\").    Weight as of this encounter: 84.8 kg (187 lb).    BMI is >= 30 and <35. (Class 1 Obesity). The following options were offered after discussion;: weight loss educational material (shared in after visit summary), exercise counseling/recommendations and nutrition counseling/recommendations      Does the patient have evidence of cognitive impairment? No    Lab Results   Component Value Date    HGBA1C 8.6 01/19/2023        HEALTH RISK ASSESSMENT    Smoking Status:  Social History     Tobacco Use   Smoking Status Never   Smokeless Tobacco Never     Alcohol Consumption:  Social History     Substance and Sexual Activity   Alcohol Use No     Fall Risk Screen:    STEADI Fall Risk Assessment was completed, and patient is at HIGH risk for falls. Assessment completed on:2/10/2023    Depression Screening:  PHQ-2/PHQ-9 Depression Screening 2/10/2023   Little Interest or Pleasure in Doing Things 1-->several days   Feeling Down, Depressed or Hopeless 2-->more than half the days   Trouble Falling or Staying Asleep, or Sleeping Too Much 0-->not at all   Feeling Tired or Having Little Energy 3-->nearly every day   Poor Appetite or Overeating 0-->not at all   Feeling Bad about Yourself - or that You are a Failure or Have Let Yourself or Your Family Down 0-->not at all   Trouble Concentrating on Things, Such as Reading the Newspaper or Watching Television 0-->not at all   Moving or Speaking So Slowly that Other " People Could Have Noticed? Or the Opposite - Being So Fidgety 0-->not at all   Thoughts that You Would be Better Off Dead or of Hurting Yourself in Some Way 0-->not at all   PHQ-9: Brief Depression Severity Measure Score 6   If You Checked Off Any Problems, How Difficult Have These Problems Made It For You to Do Your Work, Take Care of Things at Home, or Get Along with Other People? not difficult at all       Health Habits and Functional and Cognitive Screening:  Functional & Cognitive Status 7/12/2019   Do you have difficulty preparing food and eating? No   Do you have difficulty bathing yourself, getting dressed or grooming yourself? No   Do you have difficulty using the toilet? No   Do you have difficulty moving around from place to place? No   Do you have trouble with steps or getting out of a bed or a chair? No   Current Diet Unhealthy Diet   Dental Exam Up to date   Eye Exam Up to date   Exercise (times per week) -   Current Exercise Activities Include No Regular Exercise   Do you need help using the phone?  No   Are you deaf or do you have serious difficulty hearing?  No   Do you need help with transportation? Yes   Do you need help shopping? No   Do you need help preparing meals?  No   Do you need help with housework?  No   Do you need help with laundry? No   Do you need help taking your medications? No   Do you need help managing money? No   Do you ever drive or ride in a car without wearing a seat belt? No   Have you felt unusual stress, anger or loneliness in the last month? Yes   Who do you live with? Other   If you need help, do you have trouble finding someone available to you? No   Have you been bothered in the last four weeks by sexual problems? No   Do you have difficulty concentrating, remembering or making decisions? No       Age-appropriate Screening Schedule:  Refer to the list below for future screening recommendations based on patient's age, sex and/or medical conditions. Orders for these  recommended tests are listed in the plan section. The patient has been provided with a written plan.    Health Maintenance   Topic Date Due   • ZOSTER VACCINE (2 of 2) 08/05/2020   • DIABETIC EYE EXAM  10/28/2021   • HEMOGLOBIN A1C  07/19/2023   • LIPID PANEL  10/26/2023   • URINE MICROALBUMIN  12/29/2023   • TDAP/TD VACCINES (2 - Td or Tdap) 04/10/2029   • INFLUENZA VACCINE  Completed                CMS Preventative Services Quick Reference  Risk Factors Identified During Encounter  Chronic Pain: Natural history and expected course discussed. Questions answered.  Polypharmacy: Medication List reviewed and Medications are appropriate for patient  The above risks/problems have been discussed with the patient.  Pertinent information has been shared with the patient in the After Visit Summary.  An After Visit Summary and PPPS were made available to the patient.    Follow Up:   Next Medicare Wellness visit to be scheduled in 1 year.       Additional E&M Note during same encounter follows:  Patient has multiple medical problems which are significant and separately identifiable that require additional work above and beyond the Medicare Wellness Visit.      Chief Complaint  pain in rear end (6 weeks. Has to sit on a donut ) and trouble swallowing (Unable to take medication. )    Subjective        HPI  Blane Dietz is also being seen today for rt leg wound    Review of Systems   Constitutional: Positive for fatigue. Negative for activity change, appetite change and fever.   HENT: Negative for congestion, ear discharge, ear pain and trouble swallowing.    Eyes: Negative for photophobia and visual disturbance.   Respiratory: Positive for shortness of breath. Negative for cough.    Cardiovascular: Positive for leg swelling. Negative for chest pain and palpitations.   Gastrointestinal: Negative for abdominal distention, constipation, diarrhea, nausea and vomiting.   Genitourinary: Negative for dysuria, hematuria and urgency.  "  Musculoskeletal: Positive for arthralgias. Negative for back pain, joint swelling and myalgias.   Skin: Positive for wound. Negative for color change and rash.   Neurological: Negative for dizziness, weakness, light-headedness and confusion.   Hematological: Negative for adenopathy. Does not bruise/bleed easily.   Psychiatric/Behavioral: Negative for agitation and dysphoric mood. The patient is not nervous/anxious.        Objective   Vital Signs:  /42   Pulse 51   Temp 97.5 °F (36.4 °C)   Ht 167.6 cm (65.98\")   Wt 84.8 kg (187 lb) Comment: patient given  SpO2 95%   BMI 30.20 kg/m²     Physical Exam  Constitutional:       General: He is not in acute distress.     Appearance: He is well-developed.   HENT:      Nose: Nose normal.   Eyes:      General: No scleral icterus.     Conjunctiva/sclera: Conjunctivae normal.   Neck:      Thyroid: No thyromegaly.      Trachea: No tracheal deviation.   Cardiovascular:      Rate and Rhythm: Normal rate and regular rhythm.      Heart sounds: No murmur heard.    No friction rub.   Pulmonary:      Effort: No respiratory distress.      Breath sounds: No wheezing or rales.   Abdominal:      General: There is no distension.      Palpations: Abdomen is soft. There is no mass.      Tenderness: There is no abdominal tenderness. There is no guarding.   Musculoskeletal:         General: Swelling and deformity present. Normal range of motion.      Right lower leg: Edema present.      Left lower leg: Edema present.   Lymphadenopathy:      Cervical: No cervical adenopathy.   Skin:     General: Skin is warm and dry.      Findings: Lesion present. No erythema or rash.   Neurological:      Mental Status: He is alert and oriented to person, place, and time.      Cranial Nerves: No cranial nerve deficit.      Coordination: Coordination normal.      Deep Tendon Reflexes: Reflexes are normal and symmetric.   Psychiatric:         Behavior: Behavior normal.         Thought Content: Thought " content normal.         Judgment: Judgment normal.                         Assessment and Plan   Diagnoses and all orders for this visit:    1. Stage 3b chronic kidney disease (HCC) (Primary) most likely secondary to diuretic use also monitor BMP avoid NSAIDs    2. Uncontrolled type 2 diabetes mellitus with hyperglycemia (HCC) following up with endocrinology on Trulicity Jardiance and Lantus has not had episodes suggestive of hypoglycemia.  Discussed diet    3. Wound of right lower extremity, initial encounter discussed wound care he is to clean this at least twice a day apply Bactroban and a clean dressing.  Discussed with daughter over the phone about wound care also             Follow Up   No follow-ups on file.  Patient was given instructions and counseling regarding his condition or for health maintenance advice. Please see specific information pulled into the AVS if appropriate.

## 2023-02-11 LAB
ALBUMIN SERPL-MCNC: 4.3 G/DL (ref 3.5–5.2)
ALBUMIN/GLOB SERPL: 1.7 G/DL
ALP SERPL-CCNC: 198 U/L (ref 39–117)
ALT SERPL-CCNC: 57 U/L (ref 1–41)
AST SERPL-CCNC: 59 U/L (ref 1–40)
BILIRUB SERPL-MCNC: 1.2 MG/DL (ref 0–1.2)
BUN SERPL-MCNC: 49 MG/DL (ref 8–23)
BUN/CREAT SERPL: 23.8 (ref 7–25)
CALCIUM SERPL-MCNC: 9.4 MG/DL (ref 8.6–10.5)
CHLORIDE SERPL-SCNC: 101 MMOL/L (ref 98–107)
CO2 SERPL-SCNC: 29 MMOL/L (ref 22–29)
CREAT SERPL-MCNC: 2.06 MG/DL (ref 0.76–1.27)
EGFRCR SERPLBLD CKD-EPI 2021: 31 ML/MIN/1.73
ERYTHROCYTE [DISTWIDTH] IN BLOOD BY AUTOMATED COUNT: 16.3 % (ref 12.3–15.4)
GLOBULIN SER CALC-MCNC: 2.5 GM/DL
GLUCOSE SERPL-MCNC: 173 MG/DL (ref 65–99)
HCT VFR BLD AUTO: 38.8 % (ref 37.5–51)
HGB BLD-MCNC: 13 G/DL (ref 13–17.7)
MCH RBC QN AUTO: 29.4 PG (ref 26.6–33)
MCHC RBC AUTO-ENTMCNC: 33.5 G/DL (ref 31.5–35.7)
MCV RBC AUTO: 87.8 FL (ref 79–97)
PLATELET # BLD AUTO: 118 10*3/MM3 (ref 140–450)
POTASSIUM SERPL-SCNC: 4 MMOL/L (ref 3.5–5.2)
PROT SERPL-MCNC: 6.8 G/DL (ref 6–8.5)
RBC # BLD AUTO: 4.42 10*6/MM3 (ref 4.14–5.8)
SODIUM SERPL-SCNC: 142 MMOL/L (ref 136–145)
WBC # BLD AUTO: 12.38 10*3/MM3 (ref 3.4–10.8)

## 2023-02-13 ENCOUNTER — TELEPHONE (OUTPATIENT)
Dept: INTERNAL MEDICINE | Facility: CLINIC | Age: 88
End: 2023-02-13

## 2023-02-13 DIAGNOSIS — S81.801A WOUND OF RIGHT LOWER EXTREMITY, INITIAL ENCOUNTER: Primary | ICD-10-CM

## 2023-02-13 NOTE — TELEPHONE ENCOUNTER
Caller: Keyla Dietz    Relationship: Emergency Contact    Best call back number:  [phone]    What orders are you requesting (i.e. lab or imaging): ORDER FOR HOME HEALTH FOR [facility]     Where will you receive your lab/imaging services:  HOME [facility]     Additional notes: PATIENT WAS DISCHARGED FROM HOME HEALTH ON 2-9-23 FOR A WOUND ON HIS LEG   THE PATIENT HIT HIS LEG THAT NIGHT AND SCRAPED SEVERAL PLACES ON HIS LEG AGAIN AND IT  WAS BLEEDING     [facility] IS REQUESTING [facility] COME OUT TWICE A WEEK AND CHANGE THE BANDAGES FOR THE PATIENT   SHE TALKED WITH [facility] AND THEY SAID A NEW ORDER WOULD HAVE TO BE PUT IN

## 2023-02-22 ENCOUNTER — TELEPHONE (OUTPATIENT)
Dept: INTERNAL MEDICINE | Facility: CLINIC | Age: 88
End: 2023-02-22

## 2023-02-22 NOTE — TELEPHONE ENCOUNTER
Spoke with Susi, wants to change dsg to Zerofoam bid instead of bactroban.  Patient is unable to do dressings himself, ok with you?

## 2023-02-22 NOTE — TELEPHONE ENCOUNTER
Caller: WILVER WITH [facility]    Best call back number: [phone]    What was the call regarding:   WILVER WITH [facility] WOULD LIKE A CALL BACK REGARDING GETTING PATIENT'S   mupirocin (Bactroban) 2 % cream  CHANGED TO A ZERO FORM DRESSING DUE TO PATIENT NOT HAVING A CAREGIVER AND PATIENT'S INSURANCE WILL NOT COVER FOR [employer] TO COME OUT TO PATIENT'S HOUSE TWICE A DAY TO APPLY THE BACTROBAN CREAM     Do you require a callback: YES

## 2023-03-17 ENCOUNTER — OUTSIDE FACILITY SERVICE (OUTPATIENT)
Dept: INTERNAL MEDICINE | Facility: CLINIC | Age: 88
End: 2023-03-17
Payer: MEDICARE

## 2023-03-17 PROCEDURE — G0180 MD CERTIFICATION HHA PATIENT: HCPCS | Performed by: INTERNAL MEDICINE

## 2023-03-30 ENCOUNTER — OFFICE VISIT (OUTPATIENT)
Dept: INTERNAL MEDICINE | Facility: CLINIC | Age: 88
End: 2023-03-30
Payer: MEDICARE

## 2023-03-30 VITALS
HEIGHT: 66 IN | HEART RATE: 55 BPM | DIASTOLIC BLOOD PRESSURE: 55 MMHG | BODY MASS INDEX: 30.86 KG/M2 | WEIGHT: 192 LBS | TEMPERATURE: 97 F | OXYGEN SATURATION: 96 % | SYSTOLIC BLOOD PRESSURE: 104 MMHG

## 2023-03-30 DIAGNOSIS — T14.8XXA INFECTED SKIN TEAR: ICD-10-CM

## 2023-03-30 DIAGNOSIS — E11.65 UNCONTROLLED TYPE 2 DIABETES MELLITUS WITH HYPERGLYCEMIA: ICD-10-CM

## 2023-03-30 DIAGNOSIS — N18.32 STAGE 3B CHRONIC KIDNEY DISEASE: ICD-10-CM

## 2023-03-30 DIAGNOSIS — I10 ESSENTIAL HYPERTENSION: Primary | ICD-10-CM

## 2023-03-30 DIAGNOSIS — L08.9 INFECTED SKIN TEAR: ICD-10-CM

## 2023-03-30 PROBLEM — J90 PLEURAL EFFUSION, BILATERAL: Status: RESOLVED | Noted: 2022-06-03 | Resolved: 2023-03-30

## 2023-03-30 PROBLEM — G89.18 ACUTE POSTOPERATIVE PAIN: Status: RESOLVED | Noted: 2021-12-08 | Resolved: 2023-03-30

## 2023-03-30 PROBLEM — K74.60 CIRRHOSIS OF LIVER: Status: RESOLVED | Noted: 2022-06-03 | Resolved: 2023-03-30

## 2023-03-30 PROBLEM — D72.829 LEUKOCYTOSIS: Status: RESOLVED | Noted: 2021-03-28 | Resolved: 2023-03-30

## 2023-03-30 PROBLEM — I50.9 ACUTE ON CHRONIC CONGESTIVE HEART FAILURE, UNSPECIFIED HEART FAILURE TYPE: Status: RESOLVED | Noted: 2022-06-02 | Resolved: 2023-03-30

## 2023-03-30 PROBLEM — J18.9 PNEUMONIA: Status: RESOLVED | Noted: 2021-03-28 | Resolved: 2023-03-30

## 2023-03-30 PROBLEM — J96.21 ACUTE ON CHRONIC RESPIRATORY FAILURE WITH HYPOXIA: Status: RESOLVED | Noted: 2021-03-28 | Resolved: 2023-03-30

## 2023-03-30 RX ORDER — CLINDAMYCIN HYDROCHLORIDE 300 MG/1
300 CAPSULE ORAL 3 TIMES DAILY
Qty: 21 CAPSULE | Refills: 0 | Status: SHIPPED | OUTPATIENT
Start: 2023-03-30

## 2023-03-30 NOTE — PROGRESS NOTES
"Subjective  Blane Dietz is a 87 y.o. male    HPI coming in for follow-up patient with a history of lower extremity edema with occasional lacerations or skin tears.  He is doing well without significant leg swelling has chronic kidney disease and hypertension.  Has better control of his blood sugar denies episodes suggestive of hypoglycemia    The following portions of the patient's history were reviewed and updated as appropriate: allergies, current medications, past family history, past medical history, past social history, past surgical history, and problem list.     Review of Systems   Constitutional: Positive for fatigue. Negative for activity change, appetite change and fever.   HENT: Negative for congestion, ear discharge, ear pain and trouble swallowing.    Eyes: Negative for photophobia and visual disturbance.   Respiratory: Negative for cough and shortness of breath.    Cardiovascular: Negative for chest pain and palpitations.   Gastrointestinal: Negative for abdominal distention, abdominal pain, constipation, diarrhea, nausea and vomiting.   Endocrine: Negative.    Genitourinary: Negative for dysuria, hematuria and urgency.   Musculoskeletal: Positive for arthralgias. Negative for back pain, joint swelling and myalgias.   Skin: Positive for rash. Negative for color change.   Allergic/Immunologic: Negative.    Neurological: Negative for dizziness, weakness, light-headedness and headaches.   Hematological: Negative for adenopathy. Does not bruise/bleed easily.   Psychiatric/Behavioral: Negative for agitation, confusion and dysphoric mood. The patient is not nervous/anxious.        Visit Vitals  /55   Pulse 55   Temp 97 °F (36.1 °C)   Ht 167.6 cm (66\")   Wt 87.1 kg (192 lb)   SpO2 96%   BMI 30.99 kg/m²       Objective  Physical Exam  Constitutional:       General: He is not in acute distress.     Appearance: He is well-developed.   HENT:      Nose: Nose normal.   Eyes:      General: No scleral " icterus.     Conjunctiva/sclera: Conjunctivae normal.   Neck:      Thyroid: No thyromegaly.      Trachea: No tracheal deviation.   Cardiovascular:      Rate and Rhythm: Normal rate and regular rhythm.      Heart sounds: No murmur heard.    No friction rub.   Pulmonary:      Effort: No respiratory distress.      Breath sounds: No wheezing or rales.   Abdominal:      General: There is no distension.      Palpations: Abdomen is soft. There is no mass.      Tenderness: There is no abdominal tenderness. There is no guarding.   Musculoskeletal:         General: Deformity present. Normal range of motion.   Lymphadenopathy:      Cervical: No cervical adenopathy.   Skin:     General: Skin is warm and dry.      Findings: Rash present. No erythema.   Neurological:      Mental Status: He is alert and oriented to person, place, and time.      Cranial Nerves: No cranial nerve deficit.      Coordination: Coordination normal.      Deep Tendon Reflexes: Reflexes are normal and symmetric.   Psychiatric:         Behavior: Behavior normal.         Thought Content: Thought content normal.         Judgment: Judgment normal.         Diagnoses and all orders for this visit:    Essential hypertension stable with current meds and low-salt diet    Infected skin tear this has healed well now without evidence of infection continue with leg elevation, compression stockings.  Diuretics as needed only now  -     clindamycin (CLEOCIN) 300 MG capsule; Take 1 capsule by mouth 3 (Three) Times a Day.    Stage 3b chronic kidney disease (HCC) stable encourage fluid intake follow BMP    Uncontrolled type 2 diabetes mellitus with hyperglycemia (HCC) continue with the dietary restrictions and the Jardiance, insulin therapy with Trulicity

## 2023-08-21 RX ORDER — SACUBITRIL AND VALSARTAN 24; 26 MG/1; MG/1
TABLET, FILM COATED ORAL
Qty: 180 TABLET | Refills: 3 | Status: SHIPPED | OUTPATIENT
Start: 2023-08-21

## 2023-08-21 RX ORDER — PRAVASTATIN SODIUM 40 MG
TABLET ORAL
Qty: 90 TABLET | Refills: 3 | Status: SHIPPED | OUTPATIENT
Start: 2023-08-21

## 2023-09-11 ENCOUNTER — OFFICE VISIT (OUTPATIENT)
Dept: ONCOLOGY | Facility: CLINIC | Age: 88
End: 2023-09-11
Payer: MEDICARE

## 2023-09-11 ENCOUNTER — SPECIALTY PHARMACY (OUTPATIENT)
Dept: ONCOLOGY | Facility: HOSPITAL | Age: 88
End: 2023-09-11
Payer: MEDICARE

## 2023-09-11 VITALS
WEIGHT: 195 LBS | DIASTOLIC BLOOD PRESSURE: 87 MMHG | HEART RATE: 80 BPM | SYSTOLIC BLOOD PRESSURE: 204 MMHG | RESPIRATION RATE: 16 BRPM | HEIGHT: 66 IN | OXYGEN SATURATION: 94 % | BODY MASS INDEX: 31.34 KG/M2 | TEMPERATURE: 97.3 F

## 2023-09-11 DIAGNOSIS — C61 PROSTATE CANCER: Primary | ICD-10-CM

## 2023-09-11 NOTE — PROGRESS NOTES
Oral Chemotherapy - New Referral    Received a referral from Dr. Ramon    Treatment Plan: Xtandi (enzalutamide) + leuprolide monthly + denosumab monthly  Start date of treatment planned for: As soon as oral specialty medication is available.  Leuprolide scheduled for 9/20/23.  Indication: metastatic castration sensitive prostate cancer  Relevant past treatments:   -Diagnosed 2000 stage I disease s/p prostatectomy without adjuvant treatment  -2014 PSA started increasing but bone scans were negative, patient followed with watchful waiting  -Started leuprolide injections around 2017 when PSA evelyne above 10 ng/mL (bicalutamide 50 mg/day x 10 days with leuprolide initiation)  -Sept 2021 Rx for Erleada (apalutamide) was shipped from our pharmacy and patient was educated on 9/3/21 but chose not to start Erleada  -Dec 2021 paused this treatment to treat DLBCL of right kidney (s/p removal of right kidney and ureter followed by RCHOP x 3 cycles)    Is the therapy appropriate based on treatment guidelines and FDA labeling?: yes  Therapeutic Goals: Continue treatment until progression or intolerable toxicity  Patient can self-administer oral medications: Yes    Drug-Drug Interactions: The current medication list was reviewed and there are some relevant drug-drug interactions with the oral specialty medication that will be discussed during education, including:   Xtandi can lower the concentration of amiodarone.  They can be used together but the patient should be monitored and may require a dose increase in amiodarone. I will reach out to his PCP Dr. David Em.  Next appt is 9/29/23 in [place].  Not a DDI but Xtandi can raise blood sugar.  This patient has T2DM being managed by endocrinology and most of the glucose values I see in our system are elevated.  I will reach out to endocrinology to alert them about Xtandi starting and it's ability to raise blood glucose.  Medication Allergies: The patient has no relevant allergies  as it relates to their oral specialty medication  Review of Labs/Dose Adjustments: The patient's most recent labs were reviewed and all are WNL to start treatment at this dose.   I don't have very recent labs, but Xtandi does not require hepatic or renal dose reductions at baseline.  He will get repeat labs on 9/20/23 before denosumab.  Bone health  Has been receiving denosumab since around 2018, will continue this  Dr. Ramon would like the patient to start Calcium and Vitamin D, which I will explain to the patient during education.  Rx to be sent to [facility].  Dr. Ramon would like to start the patient at a reduced dose of Xtandi.  If the patient tolerates the lower dose, Dr. Ramon plans to increase to full dose 160 mg/day.  If that occurs, the new Rx can be sent for 80 mg tablets, but for now we are using 40 mg tablets to achieve the starting dose of 120 mg/day.    A prescription was released to [facility] for   Drug: Xtandi (enzalutamide)  Strength: 40 mg  Directions: Take 3 tablets by mouth daily  Quantity: 90  Refills: 11    Pharmacy education is scheduled for 9/19/23 at 11 am., CCA and consent will be signed at that time.    Jimena Singh, PharmD, [facility]  Oncology Clinical Pharmacist   Phone: [phone]    9/11/2023  17:31 EDT

## 2023-09-11 NOTE — PROGRESS NOTES
DATE OF VISIT: 9/11/2023    REASON FOR VISIT: Followup for: 1.  Diffuse large B-cell lymphoma 2.  Prostate cancer     PROBLEM LIST:  1. Prostate cancer, initially presented as [id number] status post radical  prostatectomy 2000.  A.  Patient is on Lupron plus Prolia  B.  Tried to add apalutamide second rising PSA patient declined.  C.  Whole-body PSMA PET scan done September 2023 revealed 2 separate areas of uptake in the skeleton at the L1 left transverse process and close to cartilage junction of the right third anterior rib.  D.  We will start Eligard with Xtandi September 20, 2023  2.  Right-sided pulmonary embolisms:  A.  Currently on Eliquis twice a day  3.  Type 2 diabetes  4. Hhypertension  5. Hypercholesterolemia.  6.  Right subsegmental PE:  A. Started on Eliquis twice a day March 2021  7.  Osteopenia  8.  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  Status post right nephrectomy December 7, 2021  B.  Started adjuvant chemotherapy R-CHOP January 14, 2022, status post 3 cycles completed February 23, 2022      HISTORY OF PRESENT ILLNESS: The patient is a very pleasant 88 y.o. male  with past medical history significant for diffuse large B-cell lymphoma right kidney diagnosed December 2021.  Patient status post 3 cycles of chemotherapy with R-CHOP.  Repeat PET scan showed no evidence of residual disease. The  patient is here today for scheduled follow-up visit.    SUBJECTIVE: Miki is here today with his .  He is anxious about the PET scan result.  He is walking with a walker assistance.    Past History:  Medical History: has a past medical history of Aortic stenosis, Arthritis, Bilateral impacted cerumen (11/23/2016), Bronchitis, CHF (congestive heart failure), Chronic gout of right foot (06/13/2016), COVID-19 vaccine series completed (05/12/2021), Depression, Diabetes mellitus, Diverticulitis, Exogenous obesity, Gout, Heart murmur, History of vitamin D deficiency, Hypercholesteremia  (08/11/2016), Hyperlipidemia, Hypertension, Kidney lesion, Malignant neoplasm of prostate, Morbid obesity (08/11/2016), Pneumonia (05/12/2021), Primary osteoarthritis involving multiple joints (06/13/2016), Pulmonary embolism, Sleep apnea (05/12/2021), Uncontrolled type 2 diabetes mellitus with hyperglycemia (06/13/2016), and Vertigo.   Surgical History: has a past surgical history that includes Colostomy (1999); Other surgical history; Exploratory laparotomy; Colonoscopy; Revision Colostomy; Prostate surgery; Prostatectomy (2000); Colon surgery; Tonsillectomy; Skin cancer excision; Lipoma Excision; Mohs surgery; Cardiac valve replacement (05/12/2021); nephroureterectomy (Right, 12/7/2021); and Cystoscopy (N/A, 12/7/2021).   Family History: family history includes Breast cancer in an other family member; Cancer in his mother and another family member; Diabetes in his mother and another family member; Heart disease in his father; Hypertension in an other family member; Lung cancer in his mother; Migraines in an other family member; Obesity in an other family member.   Social History: reports that he has never smoked. He has never been exposed to tobacco smoke. He has never used smokeless tobacco. He reports that he does not drink alcohol and does not use drugs.    (Not in a hospital admission)     Allergies: Ampicillin, Medrol [methylprednisolone], Myrbetriq [mirabegron], Penicillins, Bee venom, and Melatonin     Review of Systems   Constitutional:  Positive for fatigue.   Respiratory:  Positive for shortness of breath.    Cardiovascular:  Positive for leg swelling.   Gastrointestinal:  Positive for nausea.   Musculoskeletal:  Positive for arthralgias.       Current Outpatient Medications:     allopurinol (ZYLOPRIM) 100 MG tablet, Take 1 tablet by mouth Daily., Disp: 90 tablet, Rfl: 3    amiodarone (PACERONE) 200 MG tablet, Take 1 tablet by mouth Daily., Disp: , Rfl:     bumetanide (BUMEX) 1 MG tablet, Taking 1   "tablet by mouth every other day for 30 days, Disp: , Rfl:     clotrimazole-betamethasone (LOTRISONE) 1-0.05 % cream, , Disp: , Rfl:     Dulaglutide (Trulicity) 1.5 MG/0.5ML solution pen-injector, Inject 1.5 mg under the skin into the appropriate area as directed 1 (One) Time Per Week. Takes on Wednesdays, Disp: 7 mL, Rfl: 2    Entresto 24-26 MG tablet, TAKE ONE TABLET BY MOUTH EVERY 12 HOURS, Disp: 180 tablet, Rfl: 3    fluticasone (FLONASE) 50 MCG/ACT nasal spray, 2 sprays into the nostril(s) as directed by provider Daily. 2 puffs each nostril (Patient taking differently: 2 sprays into the nostril(s) as directed by provider Daily As Needed for Rhinitis or Allergies.), Disp: 1 bottle, Rfl: 0    Insulin Syringe-Needle U-100 (B-D INS SYRINGE 0.5CC/31GX5/16) 31G X 5/16\" 0.5 ML misc, Use daily to give insulin, Disp: 100 each, Rfl: 3    Jardiance 10 MG tablet tablet, Take 1 tablet by mouth Daily., Disp: 90 tablet, Rfl: 3    ketoconazole (NIZORAL) 2 % shampoo, , Disp: , Rfl:     Lantus 100 UNIT/ML injection, Inject up to 50 units daily subcutaneously, Disp: 20 mL, Rfl: 5    metoprolol succinate XL (TOPROL-XL) 25 MG 24 hr tablet, Take 0.5 tablets by mouth Daily for 360 days., Disp: 45 tablet, Rfl: 3    multivitamin with minerals tablet tablet, Take 1 tablet by mouth Daily. AREDS 2, Disp: , Rfl:     pravastatin (PRAVACHOL) 40 MG tablet, TAKE ONE TABLET BY MOUTH EVERY NIGHT AT BEDTIME, Disp: 90 tablet, Rfl: 3    PHYSICAL EXAMINATION:   BP (!) 204/87 Comment: LARA pt has not had bp meds today  Pulse 80   Temp 97.3 °F (36.3 °C) (Infrared)   Resp 16   Ht 168.9 cm (66.5\")   Wt 88.5 kg (195 lb)   SpO2 94%   BMI 31.01 kg/m²    Pain Score    09/11/23 1352   PainSc: 0-No pain        ECOG score: 1            ECOG Performance Status: 2 - Symptomatic, <50% confined to bed  General Appearance:  alert, cooperative, no apparent distress and appears stated age   Neurologic/Psychiatric: A&O x 3, gait steady, appropriate affect, " strength 5/5 in all muscle groups   HEENT:  Normocephalic, without obvious abnormality, mucous membranes moist   Neck: Supple, symmetrical, trachea midline, no adenopathy;  No thyromegaly, masses, or tenderness   Lungs:   Clear to auscultation bilaterally; respirations regular, even, and unlabored bilaterally   Heart:  Regular rate and rhythm, no murmurs appreciated   Abdomen:   Soft, non-tender, non-distended and no organomegaly   Lymph nodes: No cervical, supraclavicular, inguinal or axillary adenopathy noted   Extremities:  +2 bilateral lower extremities edema    Skin: No rashes, ulcers, or suspicious lesions noted     No visits with results within 2 Week(s) from this visit.   Latest known visit with results is:   Office Visit on 07/20/2023   Component Date Value Ref Range Status    Glucose 07/20/2023 273 (A)  70 - 130 mg/dL Final    Lot Number 07/20/2023 [id number]   Final    Expiration Date 07/20/2023 12/31/2023   Final        No results found.    ASSESSMENT: The patient is a very pleasant 88 y.o. male  with diffuse large B-cell lymphoma      PLAN:    1.  Diffuse large B-cell lymphoma stage Ia:  A.  I will do 3 months follow-up scans which should be due December 2023.    2.  Prostate cancer:  A.  I will start the patient on Lupron monthly.  B. I will start the patient on Xtandi 120 mg daily.  We will consider increasing the dose to full dose of 160 mg daily if he is doing well.  C.  I will monitor his PSA prior to each infusion.    3.  Bone metastases:  A.  I will start the patient on Xgeva 120 mg subcu every 4 weeks.  B.  I will switch him to every 3 months after the first year.  Will be due September 2024.  C.  I will start the patient on calcium and vitamin D daily.    4.  Right-sided pulmonary embolism:  A. The patient has completed 1 year of treatment.  His repeated CT PE protocol in June 2022 did not reveal any evidence of recurrent PEs.    5.  Hypercholesteremia:  A.  The patient continue pravastatin  daily.    6.  Type 2 diabetes:  A.  The patient will continue [place] and Trulicity.    FOLLOW UP: 1 month with Eligard treatment as well as blood work.    Shannan Ramon MD  9/11/2023

## 2023-09-12 ENCOUNTER — PATIENT OUTREACH (OUTPATIENT)
Dept: CASE MANAGEMENT | Facility: OTHER | Age: 88
End: 2023-09-12
Payer: MEDICARE

## 2023-09-12 DIAGNOSIS — C61 PROSTATE CANCER: Primary | ICD-10-CM

## 2023-09-12 RX ORDER — ONDANSETRON HYDROCHLORIDE 8 MG/1
8 TABLET, FILM COATED ORAL 3 TIMES DAILY PRN
Qty: 30 TABLET | Refills: 5 | Status: SHIPPED | OUTPATIENT
Start: 2023-09-12

## 2023-09-12 RX ORDER — ASPIRIN 81 MG
1 TABLET, DELAYED RELEASE (ENTERIC COATED) ORAL DAILY
Qty: 30 TABLET | Refills: 3 | Status: SHIPPED | OUTPATIENT
Start: 2023-09-12

## 2023-09-12 NOTE — OUTREACH NOTE
"AMBULATORY CASE MANAGEMENT NOTE    Name and Relationship of Patient/Support Person: Blane Dietz \"CHEO\" - Self    Patient Outreach    Patient reports he is doing well.  Lives in assisted living where all of his needs are taken care of.  Denies need for case management services - declined.    Yesy GALAN  Ambulatory Case Management    9/12/2023, 12:28 EDT  "

## 2023-09-12 NOTE — PROGRESS NOTES
Oral Chemotherapy - Double Check    Drug: Xtandi (enzalutamide)  Strength: 40mg  Directions: Take 120mg (3 tablets) by mouth daily  QTY: 90  RF: 11    Released to pharmacy: [facility]    Completed independent double check on medication order/RX.    Note Xtandi may decrease amiodarone concentrations (category C interaction). Will encourage patient to make a follow up appointment with his cardiologist to follow more closely.     Ann Cowden Mayer, PharmD, [facility]  Oncology Clinical Pharmacist  Phone [phone]    9/12/2023  10:01 EDT

## 2023-09-19 ENCOUNTER — HOSPITAL ENCOUNTER (OUTPATIENT)
Dept: ONCOLOGY | Facility: HOSPITAL | Age: 88
Discharge: HOME | End: 2023-09-19
Payer: MEDICARE

## 2023-09-19 ENCOUNTER — SPECIALTY PHARMACY (OUTPATIENT)
Dept: ONCOLOGY | Facility: HOSPITAL | Age: 88
End: 2023-09-19
Payer: MEDICARE

## 2023-09-19 NOTE — PROGRESS NOTES
Specialty Pharmacy Patient Management Program  Oncology Initial Assessment       Blane Dietz is a 88 y.o. male with metastatic castration sensitive prostate cancer seen by an Oncology provider and enrolled in the Oncology Patient Management program offered by [facility].  An initial outreach was conducted, including assessment of therapy appropriateness and specialty medication education for Xtandi (enzalutamide) and Lupron/Eligard (leuprolide). The patient was introduced to services offered by [facility], including: regular assessments, refill coordination, curbside pick-up or mail order delivery options, prior authorization maintenance, and financial assistance programs as applicable. The patient was also provided with contact information for the pharmacy team.     Regimen:   Xtandi (enzalutamide) 40mg tablets -- take 120mg (3 tablets) PO daily  Lupron IM / Eligard SQ (leuprolide) at the infusion center on day 1 of each 28-day cycle  Xgeva (denosumab) SQ at the infusion center on day 1 of each 28-day cycle    Start date of oral specialty medication:  Patient has rescheduled Lupron/Eligard + Xgeva injections. He will start Xtandi concurrently, on day 1 of the first 28-day cycle -- 9/27/23.    Relevant Past Medical History, Comorbidities, and Vaccines  Relevant medical history and concomitant health conditions were discussed with the patient. The patient's chart has been reviewed for relevant past medical history and comorbid health conditions and updated as necessary.  Vaccines are coordinated by the patient's oncologist and primary care provider.  Past Medical History:   Diagnosis Date    Aortic stenosis     status post ROSS procedure, remote, with December 2015 echocardiography revealing normal aortic and pulmonary valve function, normal ejection fraction and mild to moderate MR    Arthritis     Bilateral impacted cerumen 11/23/2016    Bronchitis     CHF  (congestive heart failure)     Chronic gout of right foot 06/13/2016    Impression: 03/08/2016 - stable.;     COVID-19 vaccine series completed 05/12/2021    Maderna 2nd dose 3/12/21.    Depression     Diabetes mellitus     Diverticulitis     Exogenous obesity     Gout     Heart murmur     History of vitamin D deficiency     Hypercholesteremia 08/11/2016    Hyperlipidemia     Hypertension     Kidney lesion     Malignant neoplasm of prostate     Morbid obesity 08/11/2016    Pneumonia 05/12/2021    Primary osteoarthritis involving multiple joints 06/13/2016    Impression: 03/08/2016 - stable;     Pulmonary embolism     Sleep apnea 05/12/2021    C-Pap    Uncontrolled type 2 diabetes mellitus with hyperglycemia 06/13/2016    Impression: 03/08/2016 - seeing Endo .;     Vertigo      Social History     Socioeconomic History    Marital status: Single   Tobacco Use    Smoking status: Never     Passive exposure: Never    Smokeless tobacco: Never   Vaping Use    Vaping Use: Never used   Substance and Sexual Activity    Alcohol use: No    Drug use: No    Sexual activity: Defer     Allergies  Known allergies and reactions were discussed with the patient. The patient's chart has been reviewed for allergy information and updated as necessary.   Allergies   Allergen Reactions    Ampicillin Anaphylaxis    Medrol [Methylprednisolone] Unknown - High Severity     Made his blood sugar get really high, can't take this    Myrbetriq [Mirabegron] Unknown - High Severity     High BP    Penicillins Anaphylaxis and Shortness Of Breath    Bee Venom Swelling    Melatonin Hallucinations      Current Medication List  This medication list has been reviewed with the patient and evaluated for any interactions or necessary modifications/recommendations, and updated to include all prescription medications, OTC medications, and supplements the patient is currently taking.  This list reflects what is contained in the patient's profile, which has also  "been marked as reviewed to communicate to other providers it is the most up to date version of the patient's current medication therapy.   Prior to Admission medications    Medication Sig Start Date End Date Taking? Authorizing Provider   allopurinol (ZYLOPRIM) 100 MG tablet Take 1 tablet by mouth Daily. 6/30/23   David Em MD   amiodarone (PACERONE) 200 MG tablet Take 1 tablet by mouth Daily. 12/5/22   Ha Toussaint MD   bumetanide (BUMEX) 1 MG tablet Taking 1  tablet by mouth every other day for 30 days 5/25/23   Dave Devi MD   Calcium Carb-Cholecalciferol 600-5 MG-MCG tablet Take 1 tablet by mouth Daily. 9/12/23   Shannan Ramon MD   clotrimazole-betamethasone (LOTRISONE) 1-0.05 % cream  6/23/23   Dave Devi MD   Dulaglutide (Trulicity) 1.5 MG/0.5ML solution pen-injector Inject 1.5 mg under the skin into the appropriate area as directed 1 (One) Time Per Week. Takes on Wednesdays 1/19/23   Xavier Boston MD   Entresto 24-26 MG tablet TAKE ONE TABLET BY MOUTH EVERY 12 HOURS 8/21/23   David Em MD   fluticasone (FLONASE) 50 MCG/ACT nasal spray 2 sprays into the nostril(s) as directed by provider Daily. 2 puffs each nostril  Patient taking differently: 2 sprays into the nostril(s) as directed by provider Daily As Needed for Rhinitis or Allergies. 12/20/19   Rosanna Nicolas APRN   Insulin Syringe-Needle U-100 (B-D INS SYRINGE 0.5CC/31GX5/16) 31G X 5/16\" 0.5 ML misc Use daily to give insulin 7/21/23   Xavier Boston MD   Jardiance 10 MG tablet tablet Take 1 tablet by mouth Daily. 1/19/23   Xavier Boston MD   ketoconazole (NIZORAL) 2 % shampoo  5/26/23   Dave Devi MD   Lantus 100 UNIT/ML injection Inject up to 50 units daily subcutaneously 7/21/23   Xavier Boston MD   metoprolol succinate XL (TOPROL-XL) 25 MG 24 hr tablet Take 0.5 tablets by mouth Daily for 360 days. 6/30/23 6/24/24  David Em MD   multivitamin " with minerals tablet tablet Take 1 tablet by mouth Daily. AREDS 2    Provider, MD Dave   ondansetron (ZOFRAN) 8 MG tablet Take 1 tablet by mouth 3 (Three) Times a Day As Needed for Nausea or Vomiting. 9/12/23   Shannan Ramon MD   pravastatin (PRAVACHOL) 40 MG tablet TAKE ONE TABLET BY MOUTH EVERY NIGHT AT BEDTIME 8/21/23   David Em MD   pravastatin (PRAVACHOL) 40 MG tablet Take 1 tablet by mouth every night at bedtime. 8/1/22 8/21/23  David Em MD   sacubitril-valsartan (ENTRESTO) 24-26 MG tablet Take 1 tablet by mouth Every 12 (Twelve) Hours. 8/1/22 8/21/23  David Em MD     Drug Interactions  Reviewed concomitant medications, allergies, labs, comorbidities/medical history, quality of life, and immunization history.   Drug-drug interactions noted and discussed during education:  Notified the patient + contacted Dr. Toussaint (cardiology) about the interaction between Xtandi and amiodarone - decreased amiodarone concentrations, which may warrant amiodarone dose increases. Patient will monitor closely for any signs of arrhythmia / palpitations, shortness of breath at rest, dizziness.  Reminded the patient to let us know before making any changes or starting any new prescription or OTC medications so we can first assess drug interactions.  Drug-food interactions noted and discussed during education as relevant.    Recommended Medications Assessment  Bone Health (such as calcium/vitamin D, bisphosphonate, RANKL inhibitor) - Indicated The patient will start taking Calcium and Vitamin D and Xgeva (denosumab) every 1 month.  VTE prophylaxis - Not Indicated   Prophylactic antimicrobials - Not Indicated   Tumor lysis syndrome prophylaxis - Risk for TLS Low    Relevant Laboratory Values  Lab Results   Component Value Date    GLUCOSE 173 (H) 02/10/2023    CALCIUM 9.4 02/10/2023     02/10/2023    K 4.0 02/10/2023    CO2 29.0 02/10/2023     02/10/2023    BUN 49 (H) 02/10/2023     CREATININE 2.06 (H) 02/10/2023    EGFRIFAFRI 95 10/14/2020    EGFRIFNONA 55 (L) 02/07/2022    BCR 23.8 02/10/2023    ANIONGAP 10.0 11/27/2022     Lab Results   Component Value Date    WBC 12.38 (H) 02/10/2023    RBC 4.42 02/10/2023    HGB 13.0 02/10/2023    HCT 38.8 02/10/2023    MCV 87.8 02/10/2023    MCH 29.4 02/10/2023    MCHC 33.5 02/10/2023    RDW 16.3 (H) 02/10/2023    RDWSD 58.2 (H) 11/27/2022    MPV 11.2 11/27/2022     (L) 02/10/2023    NEUTRORELPCT 77.1 (H) 11/27/2022    LYMPHORELPCT 8.3 (L) 11/27/2022    MONORELPCT 8.4 11/27/2022    EOSRELPCT 2.3 11/27/2022    BASORELPCT 1.8 (H) 11/27/2022    AUTOIGPER 2.1 (H) 11/27/2022    NEUTROABS 7.62 (H) 11/27/2022    LYMPHSABS 0.82 11/27/2022    MONOSABS 0.83 11/27/2022    EOSABS 0.23 11/27/2022    BASOSABS 0.18 11/27/2022    AUTOIGNUM 0.21 (H) 11/27/2022    NRBC 0.0 11/27/2022     The above labs have been reviewed. No dose adjustments are needed for the oral specialty medication(s) based on the labs.    Initial Education Provided for Specialty Medication  The patient has been provided with the following education. All questions and concerns have been addressed prior to the patient receiving the medication, and the patient has verbalized understanding of the education and any materials provided.  Additional patient education shall be provided and documented upon request by the patient, provider or payer.      Provided patient with:   Chemo calendar to help improve adherence, education sheets about the medication, 24-hour clinic phone number and my contact information and instructions to call should additional questions arise.     Medication Education Sheets Provided: (select all that apply)  Oral Specialty Medication: Xtandi (enzalutamide)  Injectable Medication: Leuprolide    Other Education Sheets Provided: (select all that apply)  Adherence and Symptom Tracker Sheet and [facility] Information    COVID Vaccines: received 3 vaccines  Flu Vaccine: 2022 flu vaccine  received    TOPICS COMMENTS   Storage and Handling of Oral Specialty Medication Store in the original container, in a dry location out of direct sunlight, and out of reach of children or pets. Store at room temperature.  Discussed safe handling and what to do with any unused medication.   Administration of Oral Specialty Medication Take with or without food at the same time(s) each day. Do not crush or chew tablets.   Adherence to Oral Specialty Regimen and Handling Missed Doses Patient is likely to have good treatment adherence; reinforced the importance of adherence. Reviewed how to address missed doses and encouraged the patient to let us know of any missed doses.   Nutrition and Appetite Changes:  importance of maintaining healthy diet & weight, ways to manage to improve intake, dietary consult, exercise regimen, electrolyte and/or blood glucose abnormalities Increased Blood Sugar: This patient's oncology therapy can increase blood sugar.  Recommended that the patient monitor blood sugar more closely. Messaged patient's endocrinologist, Dr. Xavier Boston, regarding this adverse effect. He recommends that Mr. Dietz start checking his fasting blood glucose on a daily basis. He should then send those values to the endocrinology office on a weekly basis so that medication adjustments can be made on a more immediate basis. Discussed this at length with the patient - he verbalizes understanding.    Nervous System Changes: causes, s/s, neuropathies, cognitive changes, ways to manage Discussed the possibility of hot flashes as well as mitigation strategies.   Pain: causes, ways to manage Discussed muscle and joint aches/pains with therapy, and recommended the use of OTC pain relief with ibuprofen or acetaminophen if needed.    Organ Toxicities: cause, s/s, need for diagnostic tests, labs, when to notify MD Discussed potential effects on organ systems, monitoring, diagnostic tests, labs, and when to notify the MD. Discussed  the signs/symptoms of the following: cardiotoxicity, central neurotoxicity (seizure - patient notes staff at his assisted living facility check on him every 4 hours), and hepatotoxicity.   Infusion-Related Reactions or Injection-Site Reaction: Cause, s/s, anaphylaxis, monitoring, etc. Discussed the risk of an allergic reaction and symptoms such as: dizziness, itchiness or rash, trouble breathing, wheezing, sudden back pain, or feeling faint.  Also discussed the possibility of injection-site reaction and management of such.    Miscellaneous Financial Issues: None  Lab Draws: On or before day 1 of each cycle, no sooner than 3 days early.   Infertility and Sexuality:  causes, fertility preservation options, sexuality changes, ways to manage, importance of birth control Oral Oncology Therapy: Reviewed safe sex practices and the importance of minimizing exposure to body fluids while on oral oncology therapy. The patient is not sexually active with a woman of childbearing potential.   Home Care: how to manage bodily fluids Counseled on management of soiled linens and proper flush technique.  Discussed how to manage all the side effects at home and advised when to contact the MD office.     Adherence and Self-Administration  Barriers to Patient Adherence and/or Self-Administration: None  Methods for Supporting Patient Adherence and/or Self-Administration: dosing calendar  Expected duration of therapy: Until disease progression or intolerable toxicity    Goals of Therapy  Patient Goals of Therapy:   Consistently take medications as prescribed  Patient will adhere to medication regimen  Patient will report any medication side effects to healthcare provider  Clinical Goals:    Goals Addressed Today    None     Support patient understanding of medication regimen  Ensure patient knows the pharmacy contact information  Schedule regular follow-up to monitor the treatment serious adverse events  Schedule regular follow-up to  confirm medication adherence  Schedule regular follow-up to monitor disease progression or stability    Quality of Life Assessment   The patient's current (pre-therapy) quality of life was discussed with the patient. The QOL segment of this outreach has been reviewed and updated.   Quality of Life Score: 9/10    Reassessment Plan & Follow-Up  Pharmacist to perform regular reassessments no more than (6) months from the previous assessment.  Welcome information and patient satisfaction survey to be sent by retail team with patient's initial fill.  Care Coordinator to set up future refill outreaches, coordinate prescription delivery, and escalate clinical questions to pharmacist.     Additional Plans, Therapy Recommendations or Therapy Problems to Be Addressed: Walked patient's  to the pharmacy to  his Xtandi prescription after education. Per HIPOLITO Arias, patient is ok to wait to start Xtandi until next week with day 1 of Lupron / Xgeva.      Attestation  I attest the patient was actively involved in and has agreed to the above plan of care. If the prescribed therapy is at any point deemed not appropriate based on the current or future assessments, a consultation will be initiated with the patient's specialty care provider to determine the best course of action. The revised plan of therapy will be documented along with any required assessments and/or additional patient education provided.     Ann Cowden Mayer, PharmD, [facility]  Oncology Clinical Pharmacist  Phone [phone]    Date and Time: 9/19/2023  11:00 EDT

## 2023-09-27 ENCOUNTER — INFUSION (OUTPATIENT)
Dept: ONCOLOGY | Facility: HOSPITAL | Age: 88
End: 2023-09-27
Payer: MEDICARE

## 2023-09-27 VITALS
WEIGHT: 197 LBS | RESPIRATION RATE: 18 BRPM | OXYGEN SATURATION: 94 % | HEIGHT: 66 IN | SYSTOLIC BLOOD PRESSURE: 127 MMHG | HEART RATE: 72 BPM | DIASTOLIC BLOOD PRESSURE: 58 MMHG | TEMPERATURE: 98.2 F | BODY MASS INDEX: 31.66 KG/M2

## 2023-09-27 DIAGNOSIS — C61 PROSTATE CANCER: Primary | ICD-10-CM

## 2023-09-27 LAB
ALBUMIN SERPL-MCNC: 4.1 G/DL (ref 3.5–5.2)
ALBUMIN/GLOB SERPL: 1.6 G/DL
ALP SERPL-CCNC: 155 U/L (ref 39–117)
ALT SERPL W P-5'-P-CCNC: 25 U/L (ref 1–41)
ANION GAP SERPL CALCULATED.3IONS-SCNC: 10.6 MMOL/L (ref 5–15)
AST SERPL-CCNC: 39 U/L (ref 1–40)
BASOPHILS # BLD AUTO: 0.25 10*3/MM3 (ref 0–0.2)
BASOPHILS NFR BLD AUTO: 2.2 % (ref 0–1.5)
BILIRUB SERPL-MCNC: 0.9 MG/DL (ref 0–1.2)
BUN SERPL-MCNC: 32 MG/DL (ref 8–23)
BUN/CREAT SERPL: 22.1 (ref 7–25)
CALCIUM SPEC-SCNC: 9.1 MG/DL (ref 8.6–10.5)
CHLORIDE SERPL-SCNC: 105 MMOL/L (ref 98–107)
CO2 SERPL-SCNC: 25.4 MMOL/L (ref 22–29)
CREAT SERPL-MCNC: 1.45 MG/DL (ref 0.76–1.27)
DEPRECATED RDW RBC AUTO: 53.4 FL (ref 37–54)
EGFRCR SERPLBLD CKD-EPI 2021: 46.9 ML/MIN/1.73
EOSINOPHIL # BLD AUTO: 0.39 10*3/MM3 (ref 0–0.4)
EOSINOPHIL NFR BLD AUTO: 3.4 % (ref 0.3–6.2)
ERYTHROCYTE [DISTWIDTH] IN BLOOD BY AUTOMATED COUNT: 15.1 % (ref 12.3–15.4)
GLOBULIN UR ELPH-MCNC: 2.5 GM/DL
GLUCOSE SERPL-MCNC: 178 MG/DL (ref 65–99)
HCT VFR BLD AUTO: 43.4 % (ref 37.5–51)
HGB BLD-MCNC: 13.9 G/DL (ref 13–17.7)
IMM GRANULOCYTES # BLD AUTO: 0.43 10*3/MM3 (ref 0–0.05)
IMM GRANULOCYTES NFR BLD AUTO: 3.8 % (ref 0–0.5)
LYMPHOCYTES # BLD AUTO: 1.19 10*3/MM3 (ref 0.7–3.1)
LYMPHOCYTES NFR BLD AUTO: 10.5 % (ref 19.6–45.3)
MAGNESIUM SERPL-MCNC: 2.3 MG/DL (ref 1.6–2.4)
MCH RBC QN AUTO: 30.8 PG (ref 26.6–33)
MCHC RBC AUTO-ENTMCNC: 32 G/DL (ref 31.5–35.7)
MCV RBC AUTO: 96 FL (ref 79–97)
MONOCYTES # BLD AUTO: 0.97 10*3/MM3 (ref 0.1–0.9)
MONOCYTES NFR BLD AUTO: 8.5 % (ref 5–12)
NEUTROPHILS NFR BLD AUTO: 71.6 % (ref 42.7–76)
NEUTROPHILS NFR BLD AUTO: 8.13 10*3/MM3 (ref 1.7–7)
NRBC BLD AUTO-RTO: 0 /100 WBC (ref 0–0.2)
PHOSPHATE SERPL-MCNC: 4 MG/DL (ref 2.5–4.5)
PLATELET # BLD AUTO: 110 10*3/MM3 (ref 140–450)
PMV BLD AUTO: 10.3 FL (ref 6–12)
POTASSIUM SERPL-SCNC: 4.5 MMOL/L (ref 3.5–5.2)
PROT SERPL-MCNC: 6.6 G/DL (ref 6–8.5)
PSA SERPL-MCNC: 51.4 NG/ML (ref 0–4)
RBC # BLD AUTO: 4.52 10*6/MM3 (ref 4.14–5.8)
SODIUM SERPL-SCNC: 141 MMOL/L (ref 136–145)
WBC NRBC COR # BLD: 11.36 10*3/MM3 (ref 3.4–10.8)

## 2023-09-27 PROCEDURE — 85025 COMPLETE CBC W/AUTO DIFF WBC: CPT

## 2023-09-27 PROCEDURE — 25010000002 DENOSUMAB 120 MG/1.7ML SOLUTION: Performed by: INTERNAL MEDICINE

## 2023-09-27 PROCEDURE — 36415 COLL VENOUS BLD VENIPUNCTURE: CPT

## 2023-09-27 PROCEDURE — 25010000002 LEUPROLIDE 7.5 MG KIT: Performed by: INTERNAL MEDICINE

## 2023-09-27 PROCEDURE — 96401 CHEMO ANTI-NEOPL SQ/IM: CPT

## 2023-09-27 PROCEDURE — 83735 ASSAY OF MAGNESIUM: CPT

## 2023-09-27 PROCEDURE — 84153 ASSAY OF PSA TOTAL: CPT

## 2023-09-27 PROCEDURE — 96402 CHEMO HORMON ANTINEOPL SQ/IM: CPT

## 2023-09-27 PROCEDURE — 80053 COMPREHEN METABOLIC PANEL: CPT

## 2023-09-27 PROCEDURE — 84100 ASSAY OF PHOSPHORUS: CPT

## 2023-09-27 PROCEDURE — 96372 THER/PROPH/DIAG INJ SC/IM: CPT

## 2023-09-27 RX ADMIN — DENOSUMAB 120 MG: 120 INJECTION SUBCUTANEOUS at 14:32

## 2023-09-27 RX ADMIN — LEUPROLIDE ACETATE 7.5 MG: KIT SUBCUTANEOUS at 14:34

## 2023-09-29 ENCOUNTER — OFFICE VISIT (OUTPATIENT)
Dept: INTERNAL MEDICINE | Facility: CLINIC | Age: 88
End: 2023-09-29
Payer: MEDICARE

## 2023-09-29 VITALS
SYSTOLIC BLOOD PRESSURE: 133 MMHG | HEART RATE: 72 BPM | BODY MASS INDEX: 31.95 KG/M2 | HEIGHT: 66 IN | TEMPERATURE: 98.2 F | OXYGEN SATURATION: 97 % | DIASTOLIC BLOOD PRESSURE: 63 MMHG | WEIGHT: 198.8 LBS

## 2023-09-29 DIAGNOSIS — N18.32 STAGE 3B CHRONIC KIDNEY DISEASE: ICD-10-CM

## 2023-09-29 DIAGNOSIS — Z79.4 TYPE 2 DIABETES MELLITUS WITH HYPERGLYCEMIA, WITH LONG-TERM CURRENT USE OF INSULIN: Primary | ICD-10-CM

## 2023-09-29 DIAGNOSIS — Z85.46 HISTORY OF MALIGNANT NEOPLASM OF PROSTATE: ICD-10-CM

## 2023-09-29 DIAGNOSIS — E11.65 TYPE 2 DIABETES MELLITUS WITH HYPERGLYCEMIA, WITH LONG-TERM CURRENT USE OF INSULIN: Primary | ICD-10-CM

## 2023-09-29 DIAGNOSIS — I10 ESSENTIAL HYPERTENSION: ICD-10-CM

## 2023-09-29 LAB
EXPIRATION DATE: NORMAL
HBA1C MFR BLD: 8.8 %
Lab: NORMAL

## 2023-09-29 NOTE — PROGRESS NOTES
"Subjective  Blane Dietz is a 88 y.o. male    HPI coming in for follow-up patient with a history of type 2 diabetes and chronic kidney disease history of lower extremity edema doing significantly better now with dietary restrictions history of prostate seen with significant elevation in his PSA level noted lately.  He is following up with oncology and is now currently on Xtandi.  Denies significant urinary symptoms    The following portions of the patient's history were reviewed and updated as appropriate: allergies, current medications, past family history, past medical history, past social history, past surgical history, and problem list.     Review of Systems   Constitutional:  Positive for fatigue. Negative for activity change, appetite change and fever.   HENT:  Negative for congestion, ear discharge, ear pain and trouble swallowing.    Eyes:  Negative for photophobia and visual disturbance.   Respiratory:  Negative for cough and shortness of breath.    Cardiovascular:  Positive for leg swelling. Negative for chest pain and palpitations.   Gastrointestinal:  Negative for abdominal distention, abdominal pain, constipation, diarrhea, nausea and vomiting.   Endocrine: Negative.    Genitourinary:  Negative for dysuria, hematuria and urgency.   Musculoskeletal:  Positive for arthralgias. Negative for back pain, joint swelling and myalgias.   Skin:  Negative for color change and rash.   Allergic/Immunologic: Negative.    Neurological:  Negative for dizziness, weakness, light-headedness and headaches.   Hematological:  Negative for adenopathy. Does not bruise/bleed easily.   Psychiatric/Behavioral:  Positive for sleep disturbance. Negative for agitation, confusion and dysphoric mood. The patient is not nervous/anxious.      Visit Vitals  /63   Pulse 72   Temp 98.2 °F (36.8 °C)   Ht 168.9 cm (66.5\")   Wt 90.2 kg (198 lb 12.8 oz)   SpO2 97%   BMI 31.61 kg/m²       Objective  Physical Exam  Constitutional:       " General: He is not in acute distress.     Appearance: He is well-developed.   HENT:      Nose: Nose normal.   Eyes:      General: No scleral icterus.     Conjunctiva/sclera: Conjunctivae normal.   Neck:      Thyroid: No thyromegaly.      Trachea: No tracheal deviation.   Cardiovascular:      Rate and Rhythm: Normal rate and regular rhythm.      Heart sounds: No murmur heard.    No friction rub.   Pulmonary:      Effort: No respiratory distress.      Breath sounds: No wheezing or rales.   Abdominal:      General: There is no distension.      Palpations: Abdomen is soft. There is no mass.      Tenderness: There is no abdominal tenderness. There is no guarding.   Musculoskeletal:         General: Deformity present. Normal range of motion.      Right lower leg: Edema present.      Left lower leg: Edema present.   Lymphadenopathy:      Cervical: No cervical adenopathy.   Skin:     General: Skin is warm and dry.      Findings: No erythema or rash.   Neurological:      Mental Status: He is alert and oriented to person, place, and time.      Cranial Nerves: No cranial nerve deficit.      Coordination: Coordination normal.      Gait: Gait abnormal.      Deep Tendon Reflexes: Reflexes are normal and symmetric.   Psychiatric:         Behavior: Behavior normal.         Thought Content: Thought content normal.         Judgment: Judgment normal.       Diagnoses and all orders for this visit:    Type 2 diabetes mellitus with hyperglycemia, with long-term current use of insulin A1c with still suboptimal control Jardiance dose increased to 25 mg daily continue with Lantus    Essential hypertension discussed low-sodium diet continue metoprolol    Stage 3b chronic kidney disease remains stable recent lab work with improvement in his creatinine    History of malignant neoplasm of prostate currently on chemotherapy through oncology

## 2023-10-11 ENCOUNTER — SPECIALTY PHARMACY (OUTPATIENT)
Dept: ONCOLOGY | Facility: HOSPITAL | Age: 88
End: 2023-10-11
Payer: MEDICARE

## 2023-10-16 ENCOUNTER — SPECIALTY PHARMACY (OUTPATIENT)
Dept: ONCOLOGY | Facility: HOSPITAL | Age: 88
End: 2023-10-16
Payer: MEDICARE

## 2023-10-16 NOTE — PROGRESS NOTES
"Specialty Pharmacy Refill Coordination Note     Blane \"CHEO\" is a 88 y.o. male contacted today regarding refills of  enzalutamide 120mg PO QD specialty medication(s).      Specialty medication(s) and dose(s) confirmed: yes    Refill Questions      Flowsheet Row Most Recent Value   Changes to allergies? No   Changes to medications? No   New conditions since last clinic visit No   Unplanned office visit, urgent care, ED, or hospital admission in the last 4 weeks  No   How does patient/caregiver feel medication is working? Very good   Financial problems or insurance changes  No   Since the previous refill, were any specialty medication doses or scheduled injections missed or delayed?  No   Does this patient require a clinical escalation to a pharmacist? No            Delivery Questions      Flowsheet Row Most Recent Value   Delivery method [employer]   Delivery address correct? Yes   Delivery phone number [phone]   Preferred delivery time? Anytime   Number of medications in delivery 1   Medication(s) being filled and delivered Enzalutamide   Doses left of specialty medications 4 days left   Is there any medication that is due not being filled? No   Supplies needed? No supplies needed   Cooler needed? No   Do any medications need mixed or dated? No   Copay form of payment Credit card on file   Additional comments $110   Questions or concerns for the pharmacist? No   Explain any questions or concerns for the pharmacist n/a   Are any medications first time fills? No                   Follow-up: 28 day(s)     Elyse Walton, Pharmacy Technician  Specialty Pharmacy Technician        "

## 2023-11-01 ENCOUNTER — OFFICE VISIT (OUTPATIENT)
Dept: ONCOLOGY | Facility: CLINIC | Age: 88
End: 2023-11-01
Payer: MEDICARE

## 2023-11-01 ENCOUNTER — HOSPITAL ENCOUNTER (OUTPATIENT)
Dept: INFUSION THERAPY | Facility: HOSPITAL | Age: 88
Discharge: HOME | End: 2023-11-01
Admitting: INTERNAL MEDICINE
Payer: MEDICARE

## 2023-11-01 VITALS
OXYGEN SATURATION: 92 % | HEART RATE: 81 BPM | RESPIRATION RATE: 16 BRPM | SYSTOLIC BLOOD PRESSURE: 125 MMHG | TEMPERATURE: 98.4 F | HEIGHT: 67 IN | DIASTOLIC BLOOD PRESSURE: 60 MMHG | WEIGHT: 190 LBS | BODY MASS INDEX: 29.82 KG/M2

## 2023-11-01 DIAGNOSIS — C61 PROSTATE CANCER: Primary | ICD-10-CM

## 2023-11-01 DIAGNOSIS — C61 PROSTATE CANCER: ICD-10-CM

## 2023-11-01 DIAGNOSIS — C83.39 DIFFUSE LARGE B-CELL LYMPHOMA OF SOLID ORGAN EXCLUDING SPLEEN: Primary | ICD-10-CM

## 2023-11-01 LAB
ALBUMIN SERPL-MCNC: 4 G/DL (ref 3.5–5.2)
ALBUMIN/GLOB SERPL: 1.2 G/DL
ALP SERPL-CCNC: 256 U/L (ref 39–117)
ALT SERPL W P-5'-P-CCNC: 39 U/L (ref 1–41)
ANION GAP SERPL CALCULATED.3IONS-SCNC: 9.9 MMOL/L (ref 5–15)
AST SERPL-CCNC: 41 U/L (ref 1–40)
BASOPHILS # BLD AUTO: 0.3 10*3/MM3 (ref 0–0.2)
BASOPHILS NFR BLD AUTO: 2.3 % (ref 0–1.5)
BILIRUB SERPL-MCNC: 1 MG/DL (ref 0–1.2)
BUN SERPL-MCNC: 25 MG/DL (ref 8–23)
BUN/CREAT SERPL: 16.4 (ref 7–25)
CALCIUM SPEC-SCNC: 9.4 MG/DL (ref 8.6–10.5)
CHLORIDE SERPL-SCNC: 100 MMOL/L (ref 98–107)
CO2 SERPL-SCNC: 27.1 MMOL/L (ref 22–29)
CREAT SERPL-MCNC: 1.52 MG/DL (ref 0.76–1.27)
DEPRECATED RDW RBC AUTO: 50.9 FL (ref 37–54)
EGFRCR SERPLBLD CKD-EPI 2021: 44.3 ML/MIN/1.73
EOSINOPHIL # BLD AUTO: 0.92 10*3/MM3 (ref 0–0.4)
EOSINOPHIL NFR BLD AUTO: 7.2 % (ref 0.3–6.2)
ERYTHROCYTE [DISTWIDTH] IN BLOOD BY AUTOMATED COUNT: 14.6 % (ref 12.3–15.4)
GLOBULIN UR ELPH-MCNC: 3.3 GM/DL
GLUCOSE SERPL-MCNC: 270 MG/DL (ref 65–99)
HCT VFR BLD AUTO: 45.2 % (ref 37.5–51)
HGB BLD-MCNC: 14.5 G/DL (ref 13–17.7)
IMM GRANULOCYTES # BLD AUTO: 0.55 10*3/MM3 (ref 0–0.05)
IMM GRANULOCYTES NFR BLD AUTO: 4.3 % (ref 0–0.5)
LYMPHOCYTES # BLD AUTO: 1.1 10*3/MM3 (ref 0.7–3.1)
LYMPHOCYTES NFR BLD AUTO: 8.6 % (ref 19.6–45.3)
MAGNESIUM SERPL-MCNC: 2.3 MG/DL (ref 1.6–2.4)
MCH RBC QN AUTO: 30.7 PG (ref 26.6–33)
MCHC RBC AUTO-ENTMCNC: 32.1 G/DL (ref 31.5–35.7)
MCV RBC AUTO: 95.6 FL (ref 79–97)
MONOCYTES # BLD AUTO: 1.27 10*3/MM3 (ref 0.1–0.9)
MONOCYTES NFR BLD AUTO: 9.9 % (ref 5–12)
NEUTROPHILS NFR BLD AUTO: 67.7 % (ref 42.7–76)
NEUTROPHILS NFR BLD AUTO: 8.69 10*3/MM3 (ref 1.7–7)
NRBC BLD AUTO-RTO: 0 /100 WBC (ref 0–0.2)
PHOSPHATE SERPL-MCNC: 3.8 MG/DL (ref 2.5–4.5)
PLATELET # BLD AUTO: 148 10*3/MM3 (ref 140–450)
PMV BLD AUTO: 10 FL (ref 6–12)
POTASSIUM SERPL-SCNC: 5 MMOL/L (ref 3.5–5.2)
PROT SERPL-MCNC: 7.3 G/DL (ref 6–8.5)
PSA SERPL-MCNC: 1.29 NG/ML (ref 0–4)
RBC # BLD AUTO: 4.73 10*6/MM3 (ref 4.14–5.8)
SODIUM SERPL-SCNC: 137 MMOL/L (ref 136–145)
WBC NRBC COR # BLD: 12.83 10*3/MM3 (ref 3.4–10.8)

## 2023-11-01 PROCEDURE — 99214 OFFICE O/P EST MOD 30 MIN: CPT | Performed by: INTERNAL MEDICINE

## 2023-11-01 PROCEDURE — 36415 COLL VENOUS BLD VENIPUNCTURE: CPT

## 2023-11-01 PROCEDURE — 85025 COMPLETE CBC W/AUTO DIFF WBC: CPT | Performed by: INTERNAL MEDICINE

## 2023-11-01 PROCEDURE — 1126F AMNT PAIN NOTED NONE PRSNT: CPT | Performed by: INTERNAL MEDICINE

## 2023-11-01 PROCEDURE — 80053 COMPREHEN METABOLIC PANEL: CPT | Performed by: INTERNAL MEDICINE

## 2023-11-01 PROCEDURE — 25010000002 DENOSUMAB 120 MG/1.7ML SOLUTION: Performed by: INTERNAL MEDICINE

## 2023-11-01 PROCEDURE — 84100 ASSAY OF PHOSPHORUS: CPT | Performed by: INTERNAL MEDICINE

## 2023-11-01 PROCEDURE — 96372 THER/PROPH/DIAG INJ SC/IM: CPT

## 2023-11-01 PROCEDURE — 83735 ASSAY OF MAGNESIUM: CPT | Performed by: INTERNAL MEDICINE

## 2023-11-01 PROCEDURE — 96402 CHEMO HORMON ANTINEOPL SQ/IM: CPT

## 2023-11-01 PROCEDURE — 25010000002 LEUPROLIDE 7.5 MG KIT: Performed by: INTERNAL MEDICINE

## 2023-11-01 PROCEDURE — 84153 ASSAY OF PSA TOTAL: CPT | Performed by: INTERNAL MEDICINE

## 2023-11-01 RX ADMIN — LEUPROLIDE ACETATE 7.5 MG: KIT SUBCUTANEOUS at 15:57

## 2023-11-01 RX ADMIN — DENOSUMAB 120 MG: 120 INJECTION SUBCUTANEOUS at 16:02

## 2023-11-01 NOTE — PROGRESS NOTES
DATE OF VISIT: 11/1/2023    REASON FOR VISIT: Followup for: 1.  Diffuse large B-cell lymphoma 2.  Prostate cancer     PROBLEM LIST:  1. Prostate cancer, initially presented as [id number] status post radical  prostatectomy 2000.  A.  Patient is on Lupron plus Prolia  B.  Tried to add apalutamide second rising PSA patient declined.  C.  Whole-body PSMA PET scan done September 2023 revealed 2 separate areas of uptake in the skeleton at the L1 left transverse process and close to cartilage junction of the right third anterior rib.  D.  We will start Eligard with Xtandi September 20, 2023  2.  Right-sided pulmonary embolisms:  A.  Currently on Eliquis twice a day  3.  Type 2 diabetes  4. Hhypertension  5. Hypercholesterolemia.  6.  Right subsegmental PE:  A. Started on Eliquis twice a day March 2021  7.  Osteopenia  8.  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  Status post right nephrectomy December 7, 2021  B.  Started adjuvant chemotherapy R-CHOP January 14, 2022, status post 3 cycles completed February 23, 2022      HISTORY OF PRESENT ILLNESS: The patient is a very pleasant 88 y.o. male  with past medical history significant for diffuse large B-cell lymphoma right kidney diagnosed December 2021.  Patient status post 3 cycles of chemotherapy with R-CHOP.  Repeat PET scan showed no evidence of residual disease. The  patient is here today for scheduled follow-up visit.    SUBJECTIVE: Miki is here today by himself.  He is doing fairly well.  Denies any fever chills or night sweats.  He has been compliant with his treatment.    Past History:  Medical History: has a past medical history of Aortic stenosis, Arthritis, Bilateral impacted cerumen (11/23/2016), Bronchitis, CHF (congestive heart failure), Chronic gout of right foot (06/13/2016), COVID-19 vaccine series completed (05/12/2021), Depression, Diabetes mellitus, Diverticulitis, Exogenous obesity, Gout, Heart murmur, History of vitamin D  deficiency, Hypercholesteremia (08/11/2016), Hyperlipidemia, Hypertension, Kidney lesion, Malignant neoplasm of prostate, Morbid obesity (08/11/2016), Pneumonia (05/12/2021), Primary osteoarthritis involving multiple joints (06/13/2016), Pulmonary embolism, Sleep apnea (05/12/2021), Uncontrolled type 2 diabetes mellitus with hyperglycemia (06/13/2016), and Vertigo.   Surgical History: has a past surgical history that includes Colostomy (1999); Other surgical history; Exploratory laparotomy; Colonoscopy; Revision Colostomy; Prostate surgery; Prostatectomy (2000); Colon surgery; Tonsillectomy; Skin cancer excision; Lipoma Excision; Mohs surgery; Cardiac valve replacement (05/12/2021); nephroureterectomy (Right, 12/7/2021); and Cystoscopy (N/A, 12/7/2021).   Family History: family history includes Breast cancer in an other family member; Cancer in his mother and another family member; Diabetes in his mother and another family member; Heart disease in his father; Hypertension in an other family member; Lung cancer in his mother; Migraines in an other family member; Obesity in an other family member.   Social History: reports that he has never smoked. He has never been exposed to tobacco smoke. He has never used smokeless tobacco. He reports that he does not drink alcohol and does not use drugs.    (Not in a hospital admission)     Allergies: Ampicillin, Medrol [methylprednisolone], Myrbetriq [mirabegron], Penicillins, Bee venom, and Melatonin     Review of Systems   Constitutional:  Positive for fatigue.   Respiratory:  Positive for shortness of breath.    Gastrointestinal:  Positive for nausea.   Musculoskeletal:  Positive for arthralgias.         Current Outpatient Medications:     allopurinol (ZYLOPRIM) 100 MG tablet, Take 1 tablet by mouth Daily., Disp: 90 tablet, Rfl: 3    amiodarone (PACERONE) 200 MG tablet, Take 1 tablet by mouth Daily., Disp: , Rfl:     bumetanide (BUMEX) 1 MG tablet, Taking 1  tablet by mouth  "every other day for 30 days, Disp: , Rfl:     Calcium Carb-Cholecalciferol 600-5 MG-MCG tablet, Take 1 tablet by mouth Daily., Disp: 30 tablet, Rfl: 3    clotrimazole-betamethasone (LOTRISONE) 1-0.05 % cream, , Disp: , Rfl:     Dulaglutide (Trulicity) 1.5 MG/0.5ML solution pen-injector, Inject 1.5 mg under the skin into the appropriate area as directed 1 (One) Time Per Week. Takes on Wednesdays, Disp: 7 mL, Rfl: 2    empagliflozin (Jardiance) 25 MG tablet tablet, Take 1 tablet by mouth Daily., Disp: 90 tablet, Rfl: 3    Entresto 24-26 MG tablet, TAKE ONE TABLET BY MOUTH EVERY 12 HOURS, Disp: 180 tablet, Rfl: 3    enzalutamide (XTANDI) 40 MG tablet tablet, Take 3 tablets by mouth Daily., Disp: 90 tablet, Rfl: 11    fluticasone (FLONASE) 50 MCG/ACT nasal spray, 2 sprays into the nostril(s) as directed by provider Daily. 2 puffs each nostril (Patient taking differently: 2 sprays into the nostril(s) as directed by provider Daily As Needed for Rhinitis or Allergies.), Disp: 1 bottle, Rfl: 0    Insulin Syringe-Needle U-100 (B-D INS SYRINGE 0.5CC/31GX5/16) 31G X 5/16\" 0.5 ML misc, Use daily to give insulin, Disp: 100 each, Rfl: 3    ketoconazole (NIZORAL) 2 % shampoo, , Disp: , Rfl:     Lantus 100 UNIT/ML injection, Inject up to 50 units daily subcutaneously, Disp: 20 mL, Rfl: 5    metoprolol succinate XL (TOPROL-XL) 25 MG 24 hr tablet, Take 0.5 tablets by mouth Daily for 360 days., Disp: 45 tablet, Rfl: 3    multivitamin with minerals tablet tablet, Take 1 tablet by mouth Daily. AREDS 2, Disp: , Rfl:     ondansetron (ZOFRAN) 8 MG tablet, Take 1 tablet by mouth 3 (Three) Times a Day As Needed for Nausea or Vomiting., Disp: 30 tablet, Rfl: 5    pravastatin (PRAVACHOL) 40 MG tablet, TAKE ONE TABLET BY MOUTH EVERY NIGHT AT BEDTIME, Disp: 90 tablet, Rfl: 3    PHYSICAL EXAMINATION:   There were no vitals taken for this visit.   There were no vitals filed for this visit.                    ECOG Performance Status: 2 - " Symptomatic, <50% confined to bed  General Appearance:  alert, cooperative, no apparent distress and appears stated age   Neurologic/Psychiatric: A&O x 3, gait steady, appropriate affect, strength 5/5 in all muscle groups   HEENT:  Normocephalic, without obvious abnormality, mucous membranes moist   Neck: Supple, symmetrical, trachea midline, no adenopathy;  No thyromegaly, masses, or tenderness   Lungs:   Clear to auscultation bilaterally; respirations regular, even, and unlabored bilaterally   Heart:  Regular rate and rhythm, no murmurs appreciated   Abdomen:   Soft, non-tender, non-distended, and no organomegaly   Lymph nodes: No cervical, supraclavicular, inguinal or axillary adenopathy noted   Extremities:  +2 bilateral lower extremities edema    Skin: No rashes, ulcers, or suspicious lesions noted     No visits with results within 2 Week(s) from this visit.   Latest known visit with results is:   Office Visit on 09/29/2023   Component Date Value Ref Range Status    Hemoglobin A1C 09/29/2023 8.8  % Final    Lot Number 09/29/2023 [id number]   Final    Expiration Date 09/29/2023 [id number]   Final        No results found.    ASSESSMENT: The patient is a very pleasant 88 y.o. male  with diffuse large B-cell lymphoma      PLAN:    1.  Diffuse large B-cell lymphoma stage Ia:  A.  I will do 3 months follow-up scans which should be due December 2023.    2.  Prostate cancer:  A.  I will start the patient on Lupron monthly.  B. I will continue the patient on Xtandi 120 mg daily.  We will consider increasing the dose to full dose of 160 mg daily if he is doing well.  C.  I will monitor his PSA prior to each infusion.  I we will follow-up on his PSA from today.  His PSA peaked at 52 prior to starting treatment.  3.  Bone metastases:  A.  I will start the patient on Xgeva 120 mg subcu every 4 weeks.  B.  I will switch him to every 3 months after the first year.  Will be due September 2024.  C.  I will continue the patient  on calcium and vitamin D daily.    4.  Right-sided pulmonary embolism:  A. The patient has completed 1 year of treatment.  His repeated CT PE protocol in June 2022 did not reveal any evidence of recurrent PEs.    5.  Hypercholesteremia:  A.  The patient continue pravastatin daily.    6.  Type 2 diabetes:  A.  The patient will continue [place] and Trulicity.    FOLLOW UP: 1 month with Eligard treatment as well as blood work.    Shannan Ramon MD  11/1/2023

## 2023-11-02 ENCOUNTER — TELEPHONE (OUTPATIENT)
Dept: ONCOLOGY | Facility: CLINIC | Age: 88
End: 2023-11-02
Payer: MEDICARE

## 2023-11-02 NOTE — TELEPHONE ENCOUNTER
Unable to reach patient, no answer and voicemail full.  Called his daughter Susana and let her know the results of his PSA from yesterday.

## 2023-11-03 ENCOUNTER — SPECIALTY PHARMACY (OUTPATIENT)
Dept: ONCOLOGY | Facility: HOSPITAL | Age: 88
End: 2023-11-03
Payer: MEDICARE

## 2023-11-14 ENCOUNTER — SPECIALTY PHARMACY (OUTPATIENT)
Dept: ONCOLOGY | Facility: HOSPITAL | Age: 88
End: 2023-11-14
Payer: MEDICARE

## 2023-11-14 NOTE — PROGRESS NOTES
"Specialty Pharmacy Refill Coordination Note     Blane \"CHEO\" is a 88 y.o. male contacted today regarding refills of  enzalutamide 120mg PO QD specialty medication(s).    Patient request medication be mailed out on 11/20/23 for delivery on 11/21/23.    Specialty medication(s) and dose(s) confirmed: yes    Refill Questions      Flowsheet Row Most Recent Value   Changes to allergies? No   Changes to medications? No   New conditions since last clinic visit No   Unplanned office visit, urgent care, ED, or hospital admission in the last 4 weeks  No   How does patient/caregiver feel medication is working? Good   Financial problems or insurance changes  No   Since the previous refill, were any specialty medication doses or scheduled injections missed or delayed?  No   Does this patient require a clinical escalation to a pharmacist? No            Delivery Questions      Flowsheet Row Most Recent Value   Delivery method [employer]   Delivery address correct? Yes   Delivery phone number [phone]   Preferred delivery time? Anytime   Number of medications in delivery 1   Medication(s) being filled and delivered Enzalutamide   Doses left of specialty medications 10 days left   Is there any medication that is due not being filled? No   Supplies needed? No supplies needed   Cooler needed? No   Do any medications need mixed or dated? No   Copay form of payment Credit card on file   Additional comments $110   Questions or concerns for the pharmacist? No   Explain any questions or concerns for the pharmacist n/a   Are any medications first time fills? No                   Follow-up: 30 day(s)     Elyse Walton, Pharmacy Technician  Specialty Pharmacy Technician        "

## 2023-11-28 ENCOUNTER — TELEPHONE (OUTPATIENT)
Dept: INTERNAL MEDICINE | Facility: CLINIC | Age: 88
End: 2023-11-28

## 2023-11-28 ENCOUNTER — HOME HEALTH ADMISSION (OUTPATIENT)
Dept: HOME HEALTH SERVICES | Facility: HOME HEALTHCARE | Age: 88
End: 2023-11-28
Payer: MEDICARE

## 2023-11-28 ENCOUNTER — HOSPITAL ENCOUNTER (EMERGENCY)
Facility: HOSPITAL | Age: 88
Discharge: HOME | End: 2023-11-28
Attending: EMERGENCY MEDICINE | Admitting: EMERGENCY MEDICINE
Payer: MEDICARE

## 2023-11-28 ENCOUNTER — APPOINTMENT (OUTPATIENT)
Dept: CT IMAGING | Facility: HOSPITAL | Age: 88
End: 2023-11-28
Payer: MEDICARE

## 2023-11-28 VITALS
DIASTOLIC BLOOD PRESSURE: 58 MMHG | HEART RATE: 86 BPM | RESPIRATION RATE: 16 BRPM | HEIGHT: 67 IN | TEMPERATURE: 98.2 F | BODY MASS INDEX: 29.82 KG/M2 | OXYGEN SATURATION: 94 % | SYSTOLIC BLOOD PRESSURE: 135 MMHG | WEIGHT: 190 LBS

## 2023-11-28 DIAGNOSIS — L97.921 ULCER OF LEFT LOWER EXTREMITY, LIMITED TO BREAKDOWN OF SKIN: Primary | ICD-10-CM
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
## 2023-11-28 LAB
ALBUMIN SERPL-MCNC: 3.8 G/DL (ref 3.5–5.2)
ALBUMIN/GLOB SERPL: 1.2 G/DL
ALP SERPL-CCNC: 197 U/L (ref 39–117)
ALT SERPL W P-5'-P-CCNC: 36 U/L (ref 1–41)
ANION GAP SERPL CALCULATED.3IONS-SCNC: 11.4 MMOL/L (ref 5–15)
AST SERPL-CCNC: 50 U/L (ref 1–40)
BACTERIA UR QL AUTO: ABNORMAL /HPF
BASOPHILS # BLD AUTO: 0.24 10*3/MM3 (ref 0–0.2)
BASOPHILS # BLD MANUAL: 0.39 10*3/MM3 (ref 0–0.2)
BASOPHILS NFR BLD AUTO: 2.4 % (ref 0–1.5)
BASOPHILS NFR BLD MANUAL: 4 % (ref 0–1.5)
BILIRUB SERPL-MCNC: 1.2 MG/DL (ref 0–1.2)
BILIRUB UR QL STRIP: NEGATIVE
BUN SERPL-MCNC: 29 MG/DL (ref 8–23)
BUN/CREAT SERPL: 18.8 (ref 7–25)
CALCIUM SPEC-SCNC: 8.1 MG/DL (ref 8.6–10.5)
CHLORIDE SERPL-SCNC: 101 MMOL/L (ref 98–107)
CLARITY UR: CLEAR
CO2 SERPL-SCNC: 17.6 MMOL/L (ref 22–29)
COLOR UR: YELLOW
CREAT SERPL-MCNC: 1.54 MG/DL (ref 0.76–1.27)
DEPRECATED RDW RBC AUTO: 51.8 FL (ref 37–54)
EGFRCR SERPLBLD CKD-EPI 2021: 43.7 ML/MIN/1.73
EOSINOPHIL # BLD AUTO: 0.19 10*3/MM3 (ref 0–0.4)
EOSINOPHIL # BLD MANUAL: 0.1 10*3/MM3 (ref 0–0.4)
EOSINOPHIL NFR BLD AUTO: 1.9 % (ref 0.3–6.2)
EOSINOPHIL NFR BLD MANUAL: 1 % (ref 0.3–6.2)
ERYTHROCYTE [DISTWIDTH] IN BLOOD BY AUTOMATED COUNT: 15.3 % (ref 12.3–15.4)
GLOBULIN UR ELPH-MCNC: 3.1 GM/DL
GLUCOSE SERPL-MCNC: 156 MG/DL (ref 65–99)
GLUCOSE UR STRIP-MCNC: ABNORMAL MG/DL
HCT VFR BLD AUTO: 42 % (ref 37.5–51)
HGB BLD-MCNC: 13.8 G/DL (ref 13–17.7)
HGB UR QL STRIP.AUTO: ABNORMAL
HYALINE CASTS UR QL AUTO: ABNORMAL /LPF
IMM GRANULOCYTES # BLD AUTO: 0.61 10*3/MM3 (ref 0–0.05)
IMM GRANULOCYTES NFR BLD AUTO: 6.2 % (ref 0–0.5)
KETONES UR QL STRIP: ABNORMAL
LEUKOCYTE ESTERASE UR QL STRIP.AUTO: NEGATIVE
LYMPHOCYTES # BLD AUTO: 0.45 10*3/MM3 (ref 0.7–3.1)
LYMPHOCYTES # BLD MANUAL: 0.69 10*3/MM3 (ref 0.7–3.1)
LYMPHOCYTES NFR BLD AUTO: 4.6 % (ref 19.6–45.3)
LYMPHOCYTES NFR BLD MANUAL: 5 % (ref 5–12)
MCH RBC QN AUTO: 30.3 PG (ref 26.6–33)
MCHC RBC AUTO-ENTMCNC: 32.9 G/DL (ref 31.5–35.7)
MCV RBC AUTO: 92.3 FL (ref 79–97)
METAMYELOCYTES NFR BLD MANUAL: 1 % (ref 0–0)
MONOCYTES # BLD AUTO: 1 10*3/MM3 (ref 0.1–0.9)
MONOCYTES # BLD: 0.49 10*3/MM3 (ref 0.1–0.9)
MONOCYTES NFR BLD AUTO: 10.2 % (ref 5–12)
MYELOCYTES NFR BLD MANUAL: 5 % (ref 0–0)
NEUTROPHILS # BLD AUTO: 7.55 10*3/MM3 (ref 1.7–7)
NEUTROPHILS NFR BLD AUTO: 7.31 10*3/MM3 (ref 1.7–7)
NEUTROPHILS NFR BLD AUTO: 74.7 % (ref 42.7–76)
NEUTROPHILS NFR BLD MANUAL: 76 % (ref 42.7–76)
NEUTS BAND NFR BLD MANUAL: 1 % (ref 0–5)
NEUTS VAC BLD QL SMEAR: NORMAL
NITRITE UR QL STRIP: NEGATIVE
NRBC BLD AUTO-RTO: 0 /100 WBC (ref 0–0.2)
PH UR STRIP.AUTO: 5.5 [PH] (ref 5–8)
PLAT MORPH BLD: NORMAL
PLATELET # BLD AUTO: 84 10*3/MM3 (ref 140–450)
PMV BLD AUTO: 10.5 FL (ref 6–12)
POTASSIUM SERPL-SCNC: 5.1 MMOL/L (ref 3.5–5.2)
PROT SERPL-MCNC: 6.9 G/DL (ref 6–8.5)
PROT UR QL STRIP: ABNORMAL
RBC # BLD AUTO: 4.55 10*6/MM3 (ref 4.14–5.8)
RBC # UR STRIP: ABNORMAL /HPF
RBC MORPH BLD: NORMAL
REF LAB TEST METHOD: ABNORMAL
RENAL EPI CELLS #/AREA URNS HPF: ABNORMAL /HPF
SCAN SLIDE: NORMAL
SMALL PLATELETS BLD QL SMEAR: NORMAL
SODIUM SERPL-SCNC: 130 MMOL/L (ref 136–145)
SP GR UR STRIP: 1.03 (ref 1–1.03)
SQUAMOUS #/AREA URNS HPF: ABNORMAL /HPF
UROBILINOGEN UR QL STRIP: ABNORMAL
VARIANT LYMPHS NFR BLD MANUAL: 2 % (ref 19.6–45.3)
VARIANT LYMPHS NFR BLD MANUAL: 5 % (ref 0–5)
WBC # UR STRIP: ABNORMAL /HPF
WBC MORPH BLD: NORMAL
WBC NRBC COR # BLD AUTO: 9.8 10*3/MM3 (ref 3.4–10.8)

## 2023-11-28 PROCEDURE — 81001 URINALYSIS AUTO W/SCOPE: CPT | Performed by: NURSE PRACTITIONER

## 2023-11-28 PROCEDURE — 36415 COLL VENOUS BLD VENIPUNCTURE: CPT

## 2023-11-28 PROCEDURE — 80053 COMPREHEN METABOLIC PANEL: CPT | Performed by: NURSE PRACTITIONER

## 2023-11-28 PROCEDURE — 93005 ELECTROCARDIOGRAM TRACING: CPT | Performed by: EMERGENCY MEDICINE

## 2023-11-28 PROCEDURE — 85007 BL SMEAR W/DIFF WBC COUNT: CPT | Performed by: NURSE PRACTITIONER

## 2023-11-28 PROCEDURE — 85025 COMPLETE CBC W/AUTO DIFF WBC: CPT | Performed by: NURSE PRACTITIONER

## 2023-11-28 PROCEDURE — 70450 CT HEAD/BRAIN W/O DYE: CPT

## 2023-11-28 PROCEDURE — 99284 EMERGENCY DEPT VISIT MOD MDM: CPT

## 2023-11-28 PROCEDURE — 84484 ASSAY OF TROPONIN QUANT: CPT | Performed by: NURSE PRACTITIONER

## 2023-11-28 NOTE — TELEPHONE ENCOUNTER
Caller: Susana Dietz    Relationship to patient: Emergency Contact    Best call back number: [phone]     Chief complaint: A WOUND ON LEFT LEG THAT WAS DIAGNOSED AS INFECTED BY  URGENT CARE     Type of visit: OFFICE VISIT     Requested date: 11/30/2023 IN THE AFTERNOON OR ANYTIME ON 12/01/2023 WITH DR CHAUDHARY IF POSSIBLE, IF NOT, WITH A DIFFERENT DOCTOR, AN APRN OR PA    If rescheduling, when is the original appointment: N/A - NO AVAILABILITY      Additional notes:PLEASE CALL AND ADVISE.

## 2023-11-28 NOTE — TELEPHONE ENCOUNTER
Caller: Susana Dietz    Relationship: Emergency Contact    Best call back number: [phone]     What orders are you requesting (i.e. lab or imaging): ORDERS FOR SKILLED NURSING FOR WOUND CARE FOR  A WOUND ON LEFT LEG THAT WAS DIAGNOSED AS INFECTED BY  URGENT CARE ON 11/22/2023, 11/25/2023, 11/27/2023    In what timeframe would the patient need to come in: ASAP     Where will you receive your lab/imaging services: IN-HOME - HOME HEALTH     Additional notes: THE PATIENT'S DAUGHTER BELIEVES THAT THEY USED CARE TENDERS LAST YEAR BUT IS REQUESTING HOME HEALTH ASAP PLEASE.     PLEASE CALL AND ADVISE.

## 2023-11-28 NOTE — ED PROVIDER NOTES
Subjective:  History of Present Illness:    Patient is a 88-year-old male with prostate cancer and lymphoma.  He is currently on chemotherapy.  He reports to the ER today status post fall.  Reports that he injured his head and left elbow left knee.  Reports that his knees got weak as he was trying to get to the bathroom and fell.  Reports brief LOC.  Denies OTC medication or home remedy.  Denies alleviating exacerbating factors.    Nurses Notes reviewed and agree, including vitals, allergies, social history and prior medical history.     REVIEW OF SYSTEMS: All systems reviewed and not pertinent unless noted.  Review of Systems   Skin:  Positive for wound.   All other systems reviewed and are negative.      Past Medical History:   Diagnosis Date    Aortic stenosis     status post ROSS procedure, remote, with December 2015 echocardiography revealing normal aortic and pulmonary valve function, normal ejection fraction and mild to moderate MR    Arthritis     Bilateral impacted cerumen 11/23/2016    Bronchitis     CHF (congestive heart failure)     Chronic gout of right foot 06/13/2016    Impression: 03/08/2016 - stable.;     COVID-19 vaccine series completed 05/12/2021    [place] 2nd dose 3/12/21.    Depression     Diabetes mellitus     Diverticulitis     Exogenous obesity     Gout     Heart murmur     History of vitamin D deficiency     Hypercholesteremia 08/11/2016    Hyperlipidemia     Hypertension     Kidney lesion     Malignant neoplasm of prostate     Morbid obesity 08/11/2016    Pneumonia 05/12/2021    Primary osteoarthritis involving multiple joints 06/13/2016    Impression: 03/08/2016 - stable;     Pulmonary embolism     Sleep apnea 05/12/2021    C-Pap    Uncontrolled type 2 diabetes mellitus with hyperglycemia 06/13/2016    Impression: 03/08/2016 - seeing Endo .;     Vertigo        Allergies:    Ampicillin, Medrol [methylprednisolone], Myrbetriq [mirabegron], Penicillins, Bee venom, and Melatonin      Past  "Surgical History:   Procedure Laterality Date    CARDIAC VALVE REPLACEMENT  05/12/2021    Ross procedure 22 years ago    COLON SURGERY      COLONOSCOPY      COLOSTOMY  1999    COLOSTOMY REVISION      CYSTOSCOPY N/A 12/7/2021    Procedure: CYSTOSCOPY FLEXIBLE;  Surgeon: José Miguel Villafuerte Jr., MD;  Location:  [facility];  Service: Urology;  Laterality: N/A;    EXPLORATORY LAPAROTOMY      With Tissue Removal    LIPOMA EXCISION      MOHS SURGERY      NEPHROURETERECTOMY Right 12/7/2021    Procedure: RIGHT NEPHROURETERECTOMY LAPAROSCOPIC WITH DAVINCI ROBOT;  Surgeon: José Miguel Villafuerte Jr., MD;  Location:  [facility];  Service: Robotics - DaVinci;  Laterality: Right;    OTHER SURGICAL HISTORY      Porcine Valve    PROSTATE SURGERY      PROSTATECTOMY  2000     History of Prostatectomy Perineal Radical    SKIN CANCER EXCISION      from head and lip    TONSILLECTOMY           Social History     Socioeconomic History    Marital status: Single   Tobacco Use    Smoking status: Never     Passive exposure: Never    Smokeless tobacco: Never   Vaping Use    Vaping Use: Never used   Substance and Sexual Activity    Alcohol use: No    Drug use: No    Sexual activity: Defer         Family History   Problem Relation Age of Onset    Diabetes Mother     Lung cancer Mother     Cancer Mother     Heart disease Father     Breast cancer Other     Cancer Other     Hypertension Other     Migraines Other     Obesity Other     Diabetes Other        Objective  Physical Exam:  /70 (BP Location: Right arm, Patient Position: Lying)   Pulse 86   Temp 98.2 °F (36.8 °C) (Oral)   Resp 16   Ht 170.2 cm (67\")   Wt 86.2 kg (190 lb)   SpO2 96%   BMI 29.76 kg/m²      Physical Exam  Vitals and nursing note reviewed.   Constitutional:       Appearance: Normal appearance. He is normal weight.   HENT:      Head: Normocephalic and atraumatic.      Nose: Nose normal.      Mouth/Throat:      Mouth: Mucous membranes are moist.      Pharynx: Oropharynx " is clear.   Eyes:      Extraocular Movements: Extraocular movements intact.      Conjunctiva/sclera: Conjunctivae normal.      Pupils: Pupils are equal, round, and reactive to light.   Cardiovascular:      Rate and Rhythm: Normal rate and regular rhythm.   Pulmonary:      Effort: Pulmonary effort is normal.      Breath sounds: Normal breath sounds.   Abdominal:      General: Abdomen is flat. Bowel sounds are normal.   Musculoskeletal:         General: Normal range of motion.      Cervical back: Normal range of motion and neck supple.   Skin:     General: Skin is warm and dry.      Capillary Refill: Capillary refill takes less than 2 seconds.      Comments: Small abrasion to left elbow left knee.  Range of motion is intact.   Neurological:      General: No focal deficit present.      Mental Status: He is alert and oriented to person, place, and time. Mental status is at baseline.   Psychiatric:         Mood and Affect: Mood normal.         Behavior: Behavior normal.         Thought Content: Thought content normal.         Judgment: Judgment normal.         Procedures    ED Course:    ED Course as of 11/28/23 2249 Tue Nov 28, 2023 1824 EKG interpreted by me: Sinus rhythm, normal rate, first-degree AV block, right bundle branch block, no acute ST/T changes, this is an abnormal EKG [MP]   2045 Sodium(!): 130 [TW]      ED Course User Index  [MP] Ha Saunders MD  [TW] Orville Ayoub, HIPOLITO       Lab Results (last 24 hours)       Procedure Component Value Units Date/Time    Urinalysis With Microscopic If Indicated (No Culture) - Urine, Clean Catch [[id number]]  (Abnormal) Collected: 11/28/23 1902    Specimen: Urine, Clean Catch Updated: 11/1937     Color, UA Yellow     Appearance, UA Clear     pH, UA 5.5     Specific Gravity, UA 1.026     Glucose, UA >=1000 mg/dL (3+)     Ketones, UA Trace     Bilirubin, UA Negative     Blood, UA Small (1+)     Protein, UA Trace     Leuk Esterase, UA Negative      Nitrite, UA Negative     Urobilinogen, UA 1.0 E.U./dL    Urinalysis, Microscopic Only - Urine, Clean Catch [[id number]]  (Abnormal) Collected: 11/28/23 1902    Specimen: Urine, Clean Catch Updated: 11/28/23 2032     RBC, UA 0-2 /HPF      WBC, UA 11-20 /HPF      Bacteria, UA None Seen /HPF      Squamous Epithelial Cells, UA 0-2 /HPF      Renal Epithelial Cells, UA 0-2 /HPF      Hyaline Casts, UA None Seen /LPF      Methodology Manual Light Microscopy    Comprehensive Metabolic Panel [[id number]]  (Abnormal) Collected: 11/28/23 1929    Specimen: Blood Updated: 11/28/23 2007     Glucose 156 mg/dL      BUN 29 mg/dL      Creatinine 1.54 mg/dL      Sodium 130 mmol/L      Potassium 5.1 mmol/L      Chloride 101 mmol/L      CO2 17.6 mmol/L      Calcium 8.1 mg/dL      Total Protein 6.9 g/dL      Albumin 3.8 g/dL      ALT (SGPT) 36 U/L      AST (SGOT) 50 U/L      Alkaline Phosphatase 197 U/L      Total Bilirubin 1.2 mg/dL      Globulin 3.1 gm/dL      A/G Ratio 1.2 g/dL      BUN/Creatinine Ratio 18.8     Anion Gap 11.4 mmol/L      eGFR 43.7 mL/min/1.73     Narrative:      GFR Normal >60  Chronic Kidney Disease <60  Kidney Failure <15    The GFR formula is only valid for adults with stable renal function between ages 18 and 70.    CBC & Differential [[id number]]  (Abnormal) Collected: 11/28/23 1929    Specimen: Blood Updated: 11/28/23 2009    Narrative:      The following orders were created for panel order CBC & Differential.  Procedure                               Abnormality         Status                     ---------                               -----------         ------                     CBC Auto Differential[[id number]]        Abnormal            Final result               Scan Slide[[id number]]                                       Final result                 Please view results for these tests on the individual orders.    CBC Auto Differential [[id number]]  (Abnormal) Collected: 11/28/23 1929    Specimen: Blood Updated:  11/28/23 2001     WBC 9.80 10*3/mm3      RBC 4.55 10*6/mm3      Hemoglobin 13.8 g/dL      Hematocrit 42.0 %      MCV 92.3 fL      MCH 30.3 pg      MCHC 32.9 g/dL      RDW 15.3 %      RDW-SD 51.8 fl      MPV 10.5 fL      Platelets 84 10*3/mm3      Neutrophil % 74.7 %      Lymphocyte % 4.6 %      Monocyte % 10.2 %      Eosinophil % 1.9 %      Basophil % 2.4 %      Immature Grans % 6.2 %      Neutrophils, Absolute 7.31 10*3/mm3      Lymphocytes, Absolute 0.45 10*3/mm3      Monocytes, Absolute 1.00 10*3/mm3      Eosinophils, Absolute 0.19 10*3/mm3      Basophils, Absolute 0.24 10*3/mm3      Immature Grans, Absolute 0.61 10*3/mm3      nRBC 0.0 /100 WBC     Scan Slide [[id number]] Collected: 11/28/23 1929    Specimen: Blood Updated: 11/28/23 2009     Vacuolated Neutrophils Slight/1+     Platelet Estimate Decreased     Scan Slide --     Comment: See Manual Differential Results       Manual Differential [[id number]]  (Abnormal) Collected: 11/28/23 1929    Specimen: Blood Updated: 11/28/23 2008     Neutrophil % 76.0 %      Lymphocyte % 2.0 %      Monocyte % 5.0 %      Eosinophil % 1.0 %      Basophil % 4.0 %      Bands %  1.0 %      Metamyelocyte % 1.0 %      Myelocyte % 5.0 %      Atypical Lymphocyte % 5.0 %      Neutrophils Absolute 7.55 10*3/mm3      Lymphocytes Absolute 0.69 10*3/mm3      Monocytes Absolute 0.49 10*3/mm3      Eosinophils Absolute 0.10 10*3/mm3      Basophils Absolute 0.39 10*3/mm3      RBC Morphology Normal     WBC Morphology Normal     Platelet Morphology Normal             CT Head Without Contrast    Result Date: 11/28/2023  FINAL REPORT TECHNIQUE: Routine axial images through the head were obtained without contrast. CLINICAL HISTORY: Positive LOC with fall FINDINGS: There is  mild generalized cerebral atrophy.  The ventricles are dilated, but proportionate to the degree of atrophy.  There is no evidence of hemorrhage, mass or other acute abnormality.  No acute osseous or sinus abnormality is seen. There  is no skull fracture.     Impression: No acute intracranial abnormality. Authenticated and Electronically Signed by Willis Quiñones M.D. on 11/28/2023 07:42:04 PM        MDM      Initial impression of presenting illness:   Patient is a 88-year-old male with prostate cancer and lymphoma.  He is currently on chemotherapy.  He reports to the ER today status post fall.  Reports that he injured his head and left elbow left knee.  Reports that his knees got weak as he was trying to get to the bathroom and fell.  Reports brief LOC.  Denies OTC medication or home remedy.  Denies alleviating exacerbating factors.    DDX: includes but is not limited to: Contusion, abrasion, fracture or other    Patient arrives stable with vitals interpreted by myself.     Pertinent features from physical exam: Lung sounds are clear bilaterally throughout.  Ab soft nontender.  Bowel sounds normal.  Heart sounds normal.  There is small abrasion noted to left elbow and left knee.  Range of motion is intact in these extremities..    Initial diagnostic plan: CBC, CMP, urinalysis, CT head.    Results from initial plan were reviewed and interpreted by me revealing CBC within appropriate range for this patient.  CMP with elevation in serum creatinine glucose and BUN AST and alkaline phosphate.  There is decrease in sodium.  Head CT negative for acute findings    Diagnostic information from other sources: Chart review    Interventions / Re-evaluation: Vital signs stable throughout encounter    Results/clinical rationale were discussed with patient    Consultations/Discussion of results with other physicians: Patient does not meet admission criteria.  Consulted with Dr. Saab and she agrees that patient does not meet admission criteria.    Disposition plan: Patient is hemodynamically stable nontoxic-appearing appropriate for discharge.  Will have patient follow-up with PCP in 1 week.  Follow-up with ER for new or worsening symptoms.  -----        Final  diagnoses:   Fall, initial encounter          Orville Ayoub, APRN  11/28/23 4003

## 2023-11-29 ENCOUNTER — HOSPITAL ENCOUNTER (OUTPATIENT)
Facility: HOSPITAL | Age: 88
Setting detail: OBSERVATION
LOS: 1 days | Discharge: HOME HEALTH | End: 2023-12-04
Attending: EMERGENCY MEDICINE | Admitting: HOSPITALIST
Payer: MEDICARE

## 2023-11-29 ENCOUNTER — APPOINTMENT (OUTPATIENT)
Dept: GENERAL RADIOLOGY | Facility: HOSPITAL | Age: 88
End: 2023-11-29
Payer: MEDICARE

## 2023-11-29 ENCOUNTER — TELEPHONE (OUTPATIENT)
Dept: ONCOLOGY | Facility: CLINIC | Age: 88
End: 2023-11-29

## 2023-11-29 DIAGNOSIS — Z79.4 TYPE 2 DIABETES MELLITUS WITH HYPERGLYCEMIA, WITH LONG-TERM CURRENT USE OF INSULIN: ICD-10-CM

## 2023-11-29 DIAGNOSIS — Z79.899 ON ANTINEOPLASTIC CHEMOTHERAPY: ICD-10-CM

## 2023-11-29 DIAGNOSIS — J06.9 UPPER RESPIRATORY TRACT INFECTION DUE TO COVID-19 VIRUS: ICD-10-CM

## 2023-11-29 DIAGNOSIS — U07.1 UPPER RESPIRATORY TRACT INFECTION DUE TO COVID-19 VIRUS: ICD-10-CM

## 2023-11-29 DIAGNOSIS — N18.32 STAGE 3B CHRONIC KIDNEY DISEASE: ICD-10-CM

## 2023-11-29 DIAGNOSIS — R53.1 GENERALIZED WEAKNESS: ICD-10-CM

## 2023-11-29 DIAGNOSIS — E11.65 TYPE 2 DIABETES MELLITUS WITH HYPERGLYCEMIA, WITH LONG-TERM CURRENT USE OF INSULIN: ICD-10-CM

## 2023-11-29 DIAGNOSIS — J12.82 PNEUMONIA DUE TO COVID-19 VIRUS: ICD-10-CM

## 2023-11-29 DIAGNOSIS — U07.1 PNEUMONIA DUE TO COVID-19 VIRUS: ICD-10-CM

## 2023-11-29 DIAGNOSIS — Z86.39 HISTORY OF DIABETES MELLITUS: ICD-10-CM

## 2023-11-29 DIAGNOSIS — I10 ESSENTIAL HYPERTENSION: ICD-10-CM

## 2023-11-29 DIAGNOSIS — U07.1 COVID-19: Primary | ICD-10-CM

## 2023-11-29 LAB
ALBUMIN SERPL-MCNC: 3.9 G/DL (ref 3.5–5.2)
ALBUMIN/GLOB SERPL: 1.3 G/DL
ALP SERPL-CCNC: 219 U/L (ref 39–117)
ALT SERPL W P-5'-P-CCNC: 40 U/L (ref 1–41)
ANION GAP SERPL CALCULATED.3IONS-SCNC: 12 MMOL/L (ref 5–15)
ANISOCYTOSIS BLD QL: ABNORMAL
AST SERPL-CCNC: 55 U/L (ref 1–40)
BASOPHILS # BLD AUTO: 0.14 10*3/MM3 (ref 0–0.2)
BASOPHILS # BLD MANUAL: 0.19 10*3/MM3 (ref 0–0.2)
BASOPHILS NFR BLD AUTO: 1.7 % (ref 0–1.5)
BASOPHILS NFR BLD MANUAL: 2 % (ref 0–1.5)
BILIRUB SERPL-MCNC: 1.3 MG/DL (ref 0–1.2)
BUN SERPL-MCNC: 28 MG/DL (ref 8–23)
BUN/CREAT SERPL: 17.1 (ref 7–25)
CALCIUM SPEC-SCNC: 8.3 MG/DL (ref 8.6–10.5)
CHLORIDE SERPL-SCNC: 100 MMOL/L (ref 98–107)
CO2 SERPL-SCNC: 19 MMOL/L (ref 22–29)
CREAT SERPL-MCNC: 1.64 MG/DL (ref 0.76–1.27)
CRP SERPL-MCNC: 2.91 MG/DL (ref 0–0.5)
DEPRECATED RDW RBC AUTO: 53.1 FL (ref 37–54)
DEPRECATED RDW RBC AUTO: 54.5 FL (ref 37–54)
EGFRCR SERPLBLD CKD-EPI 2021: 40.5 ML/MIN/1.73
EOSINOPHIL # BLD AUTO: 0.1 10*3/MM3 (ref 0–0.4)
EOSINOPHIL # BLD MANUAL: 0.09 10*3/MM3 (ref 0–0.4)
EOSINOPHIL NFR BLD AUTO: 1.2 % (ref 0.3–6.2)
EOSINOPHIL NFR BLD MANUAL: 1 % (ref 0.3–6.2)
ERYTHROCYTE [DISTWIDTH] IN BLOOD BY AUTOMATED COUNT: 15.3 % (ref 12.3–15.4)
ERYTHROCYTE [DISTWIDTH] IN BLOOD BY AUTOMATED COUNT: 15.4 % (ref 12.3–15.4)
FERRITIN SERPL-MCNC: 290.5 NG/ML (ref 30–400)
FLUAV SUBTYP SPEC NAA+PROBE: NOT DETECTED
FLUBV RNA ISLT QL NAA+PROBE: NOT DETECTED
GEN 5 2HR TROPONIN T REFLEX: 27 NG/L
GLOBULIN UR ELPH-MCNC: 3.1 GM/DL
GLUCOSE BLDC GLUCOMTR-MCNC: 153 MG/DL (ref 70–130)
GLUCOSE SERPL-MCNC: 202 MG/DL (ref 65–99)
HCT VFR BLD AUTO: 37.1 % (ref 37.5–51)
HCT VFR BLD AUTO: 44.7 % (ref 37.5–51)
HGB BLD-MCNC: 11.8 G/DL (ref 13–17.7)
HGB BLD-MCNC: 14.3 G/DL (ref 13–17.7)
HOLD SPECIMEN: NORMAL
IMM GRANULOCYTES # BLD AUTO: 0.63 10*3/MM3 (ref 0–0.05)
IMM GRANULOCYTES NFR BLD AUTO: 7.4 % (ref 0–0.5)
LYMPHOCYTES # BLD AUTO: 0.93 10*3/MM3 (ref 0.7–3.1)
LYMPHOCYTES # BLD MANUAL: 0.57 10*3/MM3 (ref 0.7–3.1)
LYMPHOCYTES NFR BLD AUTO: 11 % (ref 19.6–45.3)
LYMPHOCYTES NFR BLD MANUAL: 7 % (ref 5–12)
MAGNESIUM SERPL-MCNC: 2.5 MG/DL (ref 1.6–2.4)
MCH RBC QN AUTO: 30.1 PG (ref 26.6–33)
MCH RBC QN AUTO: 30.7 PG (ref 26.6–33)
MCHC RBC AUTO-ENTMCNC: 31.8 G/DL (ref 31.5–35.7)
MCHC RBC AUTO-ENTMCNC: 32 G/DL (ref 31.5–35.7)
MCV RBC AUTO: 94.1 FL (ref 79–97)
MCV RBC AUTO: 96.6 FL (ref 79–97)
METAMYELOCYTES NFR BLD MANUAL: 4 % (ref 0–0)
MONOCYTES # BLD AUTO: 0.89 10*3/MM3 (ref 0.1–0.9)
MONOCYTES # BLD: 0.66 10*3/MM3 (ref 0.1–0.9)
MONOCYTES NFR BLD AUTO: 10.5 % (ref 5–12)
NEUTROPHILS # BLD AUTO: 7.54 10*3/MM3 (ref 1.7–7)
NEUTROPHILS NFR BLD AUTO: 5.79 10*3/MM3 (ref 1.7–7)
NEUTROPHILS NFR BLD AUTO: 68.2 % (ref 42.7–76)
NEUTROPHILS NFR BLD MANUAL: 78 % (ref 42.7–76)
NEUTS BAND NFR BLD MANUAL: 2 % (ref 0–5)
NRBC BLD AUTO-RTO: 0 /100 WBC (ref 0–0.2)
PLATELET # BLD AUTO: 67 10*3/MM3 (ref 140–450)
PLATELET # BLD AUTO: 88 10*3/MM3 (ref 140–450)
PMV BLD AUTO: 10.3 FL (ref 6–12)
PMV BLD AUTO: 10.4 FL (ref 6–12)
POTASSIUM SERPL-SCNC: 5.2 MMOL/L (ref 3.5–5.2)
PROT SERPL-MCNC: 7 G/DL (ref 6–8.5)
RBC # BLD AUTO: 3.84 10*6/MM3 (ref 4.14–5.8)
RBC # BLD AUTO: 4.75 10*6/MM3 (ref 4.14–5.8)
SARS-COV-2 RNA RESP QL NAA+PROBE: DETECTED
SMALL PLATELETS BLD QL SMEAR: ABNORMAL
SODIUM SERPL-SCNC: 131 MMOL/L (ref 136–145)
SPHEROCYTES BLD QL SMEAR: ABNORMAL
T4 FREE SERPL-MCNC: 2.06 NG/DL (ref 0.93–1.7)
TROPONIN T DELTA: -5 NG/L
TROPONIN T SERPL HS-MCNC: 25 NG/L
TROPONIN T SERPL HS-MCNC: 32 NG/L
TSH SERPL DL<=0.05 MIU/L-ACNC: 1.47 UIU/ML (ref 0.27–4.2)
VARIANT LYMPHS NFR BLD MANUAL: 2 % (ref 19.6–45.3)
VARIANT LYMPHS NFR BLD MANUAL: 4 % (ref 0–5)
WBC MORPH BLD: NORMAL
WBC NRBC COR # BLD AUTO: 8.48 10*3/MM3 (ref 3.4–10.8)
WBC NRBC COR # BLD AUTO: 9.42 10*3/MM3 (ref 3.4–10.8)
WHOLE BLOOD HOLD COAG: NORMAL
WHOLE BLOOD HOLD SPECIMEN: NORMAL

## 2023-11-29 PROCEDURE — 25010000002 ENOXAPARIN PER 10 MG: Performed by: NURSE PRACTITIONER

## 2023-11-29 PROCEDURE — 80053 COMPREHEN METABOLIC PANEL: CPT | Performed by: EMERGENCY MEDICINE

## 2023-11-29 PROCEDURE — 85007 BL SMEAR W/DIFF WBC COUNT: CPT | Performed by: EMERGENCY MEDICINE

## 2023-11-29 PROCEDURE — 84439 ASSAY OF FREE THYROXINE: CPT | Performed by: EMERGENCY MEDICINE

## 2023-11-29 PROCEDURE — 94660 CPAP INITIATION&MGMT: CPT

## 2023-11-29 PROCEDURE — 86140 C-REACTIVE PROTEIN: CPT | Performed by: NURSE PRACTITIONER

## 2023-11-29 PROCEDURE — 96372 THER/PROPH/DIAG INJ SC/IM: CPT

## 2023-11-29 PROCEDURE — 36415 COLL VENOUS BLD VENIPUNCTURE: CPT

## 2023-11-29 PROCEDURE — 99284 EMERGENCY DEPT VISIT MOD MDM: CPT

## 2023-11-29 PROCEDURE — 85025 COMPLETE CBC W/AUTO DIFF WBC: CPT | Performed by: NURSE PRACTITIONER

## 2023-11-29 PROCEDURE — 63710000001 INSULIN LISPRO (HUMAN) PER 5 UNITS: Performed by: NURSE PRACTITIONER

## 2023-11-29 PROCEDURE — 71045 X-RAY EXAM CHEST 1 VIEW: CPT

## 2023-11-29 PROCEDURE — 84484 ASSAY OF TROPONIN QUANT: CPT | Performed by: NURSE PRACTITIONER

## 2023-11-29 PROCEDURE — 85025 COMPLETE CBC W/AUTO DIFF WBC: CPT | Performed by: EMERGENCY MEDICINE

## 2023-11-29 PROCEDURE — 84443 ASSAY THYROID STIM HORMONE: CPT | Performed by: EMERGENCY MEDICINE

## 2023-11-29 PROCEDURE — 84484 ASSAY OF TROPONIN QUANT: CPT | Performed by: EMERGENCY MEDICINE

## 2023-11-29 PROCEDURE — 83735 ASSAY OF MAGNESIUM: CPT | Performed by: EMERGENCY MEDICINE

## 2023-11-29 PROCEDURE — 93005 ELECTROCARDIOGRAM TRACING: CPT | Performed by: EMERGENCY MEDICINE

## 2023-11-29 PROCEDURE — 82728 ASSAY OF FERRITIN: CPT | Performed by: NURSE PRACTITIONER

## 2023-11-29 PROCEDURE — 94799 UNLISTED PULMONARY SVC/PX: CPT

## 2023-11-29 PROCEDURE — 99223 1ST HOSP IP/OBS HIGH 75: CPT | Performed by: INTERNAL MEDICINE

## 2023-11-29 PROCEDURE — 25810000003 SODIUM CHLORIDE 0.9 % SOLUTION: Performed by: NURSE PRACTITIONER

## 2023-11-29 PROCEDURE — 82948 REAGENT STRIP/BLOOD GLUCOSE: CPT

## 2023-11-29 PROCEDURE — 87636 SARSCOV2 & INF A&B AMP PRB: CPT | Performed by: EMERGENCY MEDICINE

## 2023-11-29 RX ORDER — ENOXAPARIN SODIUM 100 MG/ML
40 INJECTION SUBCUTANEOUS DAILY
Status: DISCONTINUED | OUTPATIENT
Start: 2023-11-29 | End: 2023-12-04 | Stop reason: HOSPADM

## 2023-11-29 RX ORDER — ACETAMINOPHEN 325 MG/1
650 TABLET ORAL EVERY 4 HOURS PRN
Status: DISCONTINUED | OUTPATIENT
Start: 2023-11-29 | End: 2023-12-04 | Stop reason: HOSPADM

## 2023-11-29 RX ORDER — IBUPROFEN 600 MG/1
1 TABLET ORAL
Status: DISCONTINUED | OUTPATIENT
Start: 2023-11-29 | End: 2023-12-04 | Stop reason: HOSPADM

## 2023-11-29 RX ORDER — AMIODARONE HYDROCHLORIDE 200 MG/1
200 TABLET ORAL DAILY
Status: DISCONTINUED | OUTPATIENT
Start: 2023-11-30 | End: 2023-12-04 | Stop reason: HOSPADM

## 2023-11-29 RX ORDER — NICOTINE POLACRILEX 4 MG
15 LOZENGE BUCCAL
Status: DISCONTINUED | OUTPATIENT
Start: 2023-11-29 | End: 2023-12-04 | Stop reason: HOSPADM

## 2023-11-29 RX ORDER — NITROGLYCERIN 0.4 MG/1
0.4 TABLET SUBLINGUAL
Status: DISCONTINUED | OUTPATIENT
Start: 2023-11-29 | End: 2023-12-04 | Stop reason: HOSPADM

## 2023-11-29 RX ORDER — INSULIN LISPRO 100 [IU]/ML
2-9 INJECTION, SOLUTION INTRAVENOUS; SUBCUTANEOUS
Status: DISCONTINUED | OUTPATIENT
Start: 2023-11-29 | End: 2023-12-04 | Stop reason: HOSPADM

## 2023-11-29 RX ORDER — CALCIUM CARBONATE 500 MG/1
2 TABLET, CHEWABLE ORAL 2 TIMES DAILY PRN
Status: DISCONTINUED | OUTPATIENT
Start: 2023-11-29 | End: 2023-12-04 | Stop reason: HOSPADM

## 2023-11-29 RX ORDER — BACITRACIN ZINC, NEOMYCIN SULFATE AND POLYMYXIN B SULFATE 400; 5; 5000 [IU]/G; MG/G; [IU]/G
1 OINTMENT TOPICAL ONCE
Status: DISCONTINUED | OUTPATIENT
Start: 2023-11-29 | End: 2023-12-04 | Stop reason: HOSPADM

## 2023-11-29 RX ORDER — ALUMINA, MAGNESIA, AND SIMETHICONE 2400; 2400; 240 MG/30ML; MG/30ML; MG/30ML
15 SUSPENSION ORAL EVERY 6 HOURS PRN
Status: DISCONTINUED | OUTPATIENT
Start: 2023-11-29 | End: 2023-12-04 | Stop reason: HOSPADM

## 2023-11-29 RX ORDER — GUAIFENESIN/DEXTROMETHORPHAN 100-10MG/5
5 SYRUP ORAL EVERY 4 HOURS PRN
Status: DISCONTINUED | OUTPATIENT
Start: 2023-11-29 | End: 2023-12-04 | Stop reason: HOSPADM

## 2023-11-29 RX ORDER — SACCHAROMYCES BOULARDII 250 MG
250 CAPSULE ORAL 2 TIMES DAILY
Status: DISCONTINUED | OUTPATIENT
Start: 2023-11-29 | End: 2023-12-04 | Stop reason: HOSPADM

## 2023-11-29 RX ORDER — SODIUM CHLORIDE 9 MG/ML
50 INJECTION, SOLUTION INTRAVENOUS CONTINUOUS
Status: ACTIVE | OUTPATIENT
Start: 2023-11-29 | End: 2023-11-30

## 2023-11-29 RX ORDER — CLINDAMYCIN HYDROCHLORIDE 150 MG/1
300 CAPSULE ORAL EVERY 8 HOURS SCHEDULED
Status: DISCONTINUED | OUTPATIENT
Start: 2023-11-29 | End: 2023-12-04 | Stop reason: HOSPADM

## 2023-11-29 RX ORDER — SODIUM CHLORIDE 0.9 % (FLUSH) 0.9 %
10 SYRINGE (ML) INJECTION AS NEEDED
Status: DISCONTINUED | OUTPATIENT
Start: 2023-11-29 | End: 2023-12-04 | Stop reason: HOSPADM

## 2023-11-29 RX ORDER — SODIUM CHLORIDE 0.9 % (FLUSH) 0.9 %
10 SYRINGE (ML) INJECTION EVERY 12 HOURS SCHEDULED
Status: DISCONTINUED | OUTPATIENT
Start: 2023-11-29 | End: 2023-12-04 | Stop reason: HOSPADM

## 2023-11-29 RX ORDER — HEPARIN SODIUM 5000 [USP'U]/ML
5000 INJECTION, SOLUTION INTRAVENOUS; SUBCUTANEOUS EVERY 12 HOURS SCHEDULED
Status: CANCELLED | OUTPATIENT
Start: 2023-11-29

## 2023-11-29 RX ORDER — BISACODYL 5 MG/1
5 TABLET, DELAYED RELEASE ORAL DAILY PRN
Status: DISCONTINUED | OUTPATIENT
Start: 2023-11-29 | End: 2023-12-04 | Stop reason: HOSPADM

## 2023-11-29 RX ORDER — ALLOPURINOL 100 MG/1
100 TABLET ORAL DAILY
Status: DISCONTINUED | OUTPATIENT
Start: 2023-11-30 | End: 2023-12-04 | Stop reason: HOSPADM

## 2023-11-29 RX ORDER — METOPROLOL SUCCINATE 25 MG/1
12.5 TABLET, EXTENDED RELEASE ORAL DAILY
Status: DISCONTINUED | OUTPATIENT
Start: 2023-11-30 | End: 2023-12-04 | Stop reason: HOSPADM

## 2023-11-29 RX ORDER — PRAVASTATIN SODIUM 40 MG
40 TABLET ORAL NIGHTLY
Status: DISCONTINUED | OUTPATIENT
Start: 2023-11-29 | End: 2023-12-04 | Stop reason: HOSPADM

## 2023-11-29 RX ORDER — AMOXICILLIN 250 MG
2 CAPSULE ORAL 2 TIMES DAILY
Status: DISCONTINUED | OUTPATIENT
Start: 2023-11-29 | End: 2023-12-04 | Stop reason: HOSPADM

## 2023-11-29 RX ORDER — SODIUM CHLORIDE 9 MG/ML
40 INJECTION, SOLUTION INTRAVENOUS AS NEEDED
Status: DISCONTINUED | OUTPATIENT
Start: 2023-11-29 | End: 2023-12-04 | Stop reason: HOSPADM

## 2023-11-29 RX ORDER — BISACODYL 10 MG
10 SUPPOSITORY, RECTAL RECTAL DAILY PRN
Status: DISCONTINUED | OUTPATIENT
Start: 2023-11-29 | End: 2023-12-04 | Stop reason: HOSPADM

## 2023-11-29 RX ORDER — BENZONATATE 100 MG/1
100 CAPSULE ORAL 3 TIMES DAILY PRN
Status: DISCONTINUED | OUTPATIENT
Start: 2023-11-29 | End: 2023-12-04 | Stop reason: HOSPADM

## 2023-11-29 RX ORDER — POLYETHYLENE GLYCOL 3350 17 G/17G
17 POWDER, FOR SOLUTION ORAL DAILY PRN
Status: DISCONTINUED | OUTPATIENT
Start: 2023-11-29 | End: 2023-12-04 | Stop reason: HOSPADM

## 2023-11-29 RX ORDER — DEXTROSE MONOHYDRATE 25 G/50ML
25 INJECTION, SOLUTION INTRAVENOUS
Status: DISCONTINUED | OUTPATIENT
Start: 2023-11-29 | End: 2023-12-04 | Stop reason: HOSPADM

## 2023-11-29 RX ORDER — GUAIFENESIN 600 MG/1
600 TABLET, EXTENDED RELEASE ORAL EVERY 12 HOURS SCHEDULED
Status: DISPENSED | OUTPATIENT
Start: 2023-11-29 | End: 2023-12-02

## 2023-11-29 RX ADMIN — GUAIFENESIN 600 MG: 600 TABLET, EXTENDED RELEASE ORAL at 20:16

## 2023-11-29 RX ADMIN — SENNOSIDES AND DOCUSATE SODIUM 2 TABLET: 8.6; 5 TABLET ORAL at 20:16

## 2023-11-29 RX ADMIN — SODIUM CHLORIDE 50 ML/HR: 9 INJECTION, SOLUTION INTRAVENOUS at 21:26

## 2023-11-29 RX ADMIN — CLINDAMYCIN HYDROCHLORIDE 300 MG: 150 CAPSULE ORAL at 21:26

## 2023-11-29 RX ADMIN — PRAVASTATIN SODIUM 40 MG: 40 TABLET ORAL at 20:16

## 2023-11-29 RX ADMIN — ENOXAPARIN SODIUM 40 MG: 40 INJECTION SUBCUTANEOUS at 20:50

## 2023-11-29 RX ADMIN — SACUBITRIL AND VALSARTAN 1 TABLET: 24; 26 TABLET, FILM COATED ORAL at 20:16

## 2023-11-29 RX ADMIN — Medication 250 MG: at 20:50

## 2023-11-29 RX ADMIN — INSULIN LISPRO 2 UNITS: 100 INJECTION, SOLUTION INTRAVENOUS; SUBCUTANEOUS at 20:56

## 2023-11-29 NOTE — H&P
[facility] Medicine Services  HISTORY AND PHYSICAL    Patient Name: Blane Dietz  : 1935  MRN: [id number]  Primary Care Physician: David Em MD  Date of admission: 2023    Subjective   Subjective     Chief Complaint:  Frequent falls    HPI:  Blane Dietz is a 88 y.o. 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 Ramon for continued weakness. Patient was seen in [facility] ED yesterday for weakness and a fall at home. Today, his daughter called Dr. Ramon's office after patient slid out of bed trying to stand today at his assisted-living facility and had to have EMS get him off the floor, and daughter thought it might be related to chemotherapy. Patient presented to [facility] ED and was found to be positive for Covid-19. Patient notes headache, mild cough with sputum, congestion, low temp x 2 days. He denies SOA, CP, N/V/D. Patient notes he hit the left side of his head when he fell yesterday, but CT from [facility] was negative for hemorrhage and acute intracranial abnormality. Patient fell on his left knee and elbow and has two skin abrasions in these areas. He is currently being treated with antibiotics for a left shin wound as well. He is receiving denosumab and leuprolide infusions for bone metastasis and prostate cancer/elevated PSA.    Review of Systems   Constitutional:  Positive for chills, fatigue and fever.   HENT:  Positive for congestion.    Respiratory:  Positive for cough. Negative for chest tightness and shortness of breath.    Cardiovascular:  Negative for chest pain and leg swelling.   Gastrointestinal:  Negative for abdominal pain, constipation, diarrhea, nausea and vomiting.   Skin:  Positive for color change (erythema left knee) and  wound (shearing abrasion lateral LLE and left elbow, skin tear anterior left shin).   Neurological:  Positive for weakness and headaches. Negative for dizziness, syncope and light-headedness.   Psychiatric/Behavioral:  Negative for sleep disturbance.         Personal History     Past Medical History:   Diagnosis Date   • Aortic stenosis     status post ROSS procedure, remote, with December 2015 echocardiography revealing normal aortic and pulmonary valve function, normal ejection fraction and mild to moderate MR   • Arthritis    • Bilateral impacted cerumen 11/23/2016   • Bronchitis    • CHF (congestive heart failure)    • Chronic gout of right foot 06/13/2016    Impression: 03/08/2016 - stable.;    • COVID-19 vaccine series completed 05/12/2021    Maderna 2nd dose 3/12/21.   • Depression    • Diabetes mellitus    • Diverticulitis    • Exogenous obesity    • Gout    • Heart murmur    • History of vitamin D deficiency    • Hypercholesteremia 08/11/2016   • Hyperlipidemia    • Hypertension    • Kidney lesion    • Malignant neoplasm of prostate    • Morbid obesity 08/11/2016   • Pneumonia 05/12/2021   • Primary osteoarthritis involving multiple joints 06/13/2016    Impression: 03/08/2016 - stable;    • Pulmonary embolism    • Sleep apnea 05/12/2021    C-Pap   • Uncontrolled type 2 diabetes mellitus with hyperglycemia 06/13/2016    Impression: 03/08/2016 - seeing Endo .;    • Vertigo          Oncology Problem List:  Diffuse large B-cell lymphoma of solid organ excluding spleen (12/28/ 2021; Status: Active)  Lymphoma of kidney (12/24/2021; Status: Active)  Prostate cancer (08/11/2016; Status: Active)    Oncology/Hematology History   Prostate cancer   8/11/2016 Initial Diagnosis    Prostate cancer (CMS/HCC)     9/19/2023 -  Chemotherapy    OP PROSTATE Enzalutamide     9/27/2023 -  Chemotherapy    OP SUPPORTIVE Denosumab (Xgeva) Q28D     9/27/2023 -  Chemotherapy    OP SUPPORTIVE Leuprolide 7.5 mg Q28D     Diffuse large  B-cell lymphoma of solid organ excluding spleen   12/28/2021 Initial Diagnosis    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)     12/28/2021 Cancer Staged    Staging form: Hodgkin And Non-Hodgkin Lymphoma, AJCC 8th Edition  - Clinical stage from 12/28/2021: Stage IE (Diffuse large B-cell lymphoma) - Signed by Shannan Ramon MD on 12/28/2021 1/21/2022 -  Chemotherapy    OP CENTRAL VENOUS ACCESS DEVICE ACCESS, CARE, AND MAINTENANCE (CVAD)         Past Surgical History:   Procedure Laterality Date   • CARDIAC VALVE REPLACEMENT  05/12/2021    Ross procedure 22 years ago   • COLON SURGERY     • COLONOSCOPY     • COLOSTOMY  1999   • COLOSTOMY REVISION     • CYSTOSCOPY N/A 12/7/2021    Procedure: CYSTOSCOPY FLEXIBLE;  Surgeon: José Miguel Villafuerte Jr., MD;  Location:  [facility];  Service: Urology;  Laterality: N/A;   • EXPLORATORY LAPAROTOMY      With Tissue Removal   • LIPOMA EXCISION     • MOHS SURGERY     • NEPHROURETERECTOMY Right 12/7/2021    Procedure: RIGHT NEPHROURETERECTOMY LAPAROSCOPIC WITH DAVINCI ROBOT;  Surgeon: José Miguel Villafuerte Jr., MD;  Location:  [facility];  Service: Robotics - DaVinci;  Laterality: Right;   • OTHER SURGICAL HISTORY      Porcine Valve   • PROSTATE SURGERY     • PROSTATECTOMY  2000     History of Prostatectomy Perineal Radical   • SKIN CANCER EXCISION      from head and lip   • TONSILLECTOMY         Family History:  family history includes Breast cancer in an other family member; Cancer in his mother and another family member; Diabetes in his mother and another family member; Heart disease in his father; Hypertension in an other family member; Lung cancer in his mother; Migraines in an other family member; Obesity in an other family member.     Social History:  reports that he has never smoked. He has never been exposed to tobacco smoke. He has never used smokeless tobacco. He reports that he does not drink alcohol and does not use drugs.  Social History      Social History Narrative    Right hand dominant       Medications:  Calcium Carb-Cholecalciferol, Dulaglutide, allopurinol, amiodarone, bumetanide, clindamycin, empagliflozin, enzalutamide, fluticasone, insulin glargine, ketoconazole, metoprolol succinate XL, multivitamin with minerals, ondansetron, pravastatin, and sacubitril-valsartan    Allergies   Allergen Reactions   • Ampicillin Anaphylaxis   • Medrol [Methylprednisolone] Unknown - High Severity     Made his blood sugar get really high, can't take this   • Myrbetriq [Mirabegron] Unknown - High Severity     High BP   • Penicillins Anaphylaxis and Shortness Of Breath   • Bee Venom Swelling   • Melatonin Hallucinations       Objective   Objective     Vital Signs:   Temp:  [98.7 °F (37.1 °C)] 98.7 °F (37.1 °C)  Heart Rate:  [72-95] 92  Resp:  [16] 16  BP: (104-144)/(55-90) 117/69    Physical Exam   Constitutional: Awake, alert  Eyes: PERRLA, sclerae anicteric, no conjunctival injection  HENT: NCAT, mucous membranes moist  Neck: Supple, no thyromegaly, no lymphadenopathy, trachea midline  Respiratory: Clear to auscultation bilaterally, nonlabored respirations, room air   Cardiovascular: RRR, no murmurs, rubs, or gallops, palpable pedal pulses bilaterally  Gastrointestinal: Positive bowel sounds, soft, nontender, nondistended  Musculoskeletal: No bilateral ankle edema, no clubbing or cyanosis to extremities  Psychiatric: Appropriate affect, cooperative  Neurologic: Oriented x 3, strength symmetric in all extremities, Cranial Nerves grossly intact to confrontation, speech clear  Skin: No rashes, erythema and abrasion left knee, shearing abrasions lateral LLE and left elbows, skin tear anterior left shin      Result Review:  I have personally reviewed the results from the time of this admission to 11/29/2023 17:07 EST and agree with these findings:  [x]  Laboratory list / accordion  []  Microbiology  [x]  Radiology  [x]  EKG/Telemetry   [x]  Cardiology/Vascular    []  Pathology  [x]  Old records  []  Other:  Most notable findings include:     LAB RESULTS:      Lab 11/29/23  1357 11/28/23 1929   WBC 9.42 9.80   HEMOGLOBIN 14.3 13.8   HEMATOCRIT 44.7 42.0   PLATELETS 88* 84*   NEUTROS ABS 7.54* 7.31*  7.55*   IMMATURE GRANS (ABS)  --  0.61*   LYMPHS ABS  --  0.45*   MONOS ABS  --  1.00*   EOS ABS 0.09 0.19  0.10   MCV 94.1 92.3         Lab 11/29/23  1357 11/28/23 1929   SODIUM 131* 130*   POTASSIUM 5.2 5.1   CHLORIDE 100 101   CO2 19.0* 17.6*   ANION GAP 12.0 11.4   BUN 28* 29*   CREATININE 1.64* 1.54*   EGFR 40.5* 43.7*   GLUCOSE 202* 156*   CALCIUM 8.3* 8.1*   MAGNESIUM 2.5*  --    TSH 1.470  --          Lab 11/29/23  1357 11/28/23 1929   TOTAL PROTEIN 7.0 6.9   ALBUMIN 3.9 3.8   GLOBULIN 3.1 3.1   ALT (SGPT) 40 36   AST (SGOT) 55* 50*   BILIRUBIN 1.3* 1.2   ALK PHOS 219* 197*         Lab 11/29/23  1357   HSTROP T 32*                 Brief Urine Lab Results  (Last result in the past 365 days)        Color   Clarity   Blood   Leuk Est   Nitrite   Protein   CREAT   Urine HCG        11/28/23 1902 Yellow   Clear   Small (1+)   Negative   Negative   Trace                 Microbiology Results (last 10 days)       Procedure Component Value - Date/Time    COVID PRE-OP / PRE-PROCEDURE SCREENING ORDER (NO ISOLATION) - Swab, Nasopharynx [[id number]]  (Abnormal) Collected: 11/29/23 1400    Lab Status: Final result Specimen: Swab from Nasopharynx Updated: 11/29/23 1444    Narrative:      The following orders were created for panel order COVID PRE-OP / PRE-PROCEDURE SCREENING ORDER (NO ISOLATION) - Swab, Nasopharynx.  Procedure                               Abnormality         Status                     ---------                               -----------         ------                     COVID-19 and FLU A/B PCR...[[id number]]  Abnormal            Final result                 Please view results for these tests on the individual orders.    COVID-19 and FLU A/B PCR, 1 HR TAT - Swab,  Nasopharynx [[id number]]  (Abnormal) Collected: 11/29/23 1400    Lab Status: Final result Specimen: Swab from Nasopharynx Updated: 11/29/23 1444     COVID19 Detected     Influenza A PCR Not Detected     Influenza B PCR Not Detected    Narrative:      Fact sheet for providers: https://www.fda.gov/media/452932/download    Fact sheet for patients: https://www.fda.gov/media/423361/download    Test performed by PCR.  Influenza A and Influenza B negative results should be considered presumptive in samples that have a positive SARS-CoV-2 result.    Competitive inhibition studies showed that SARS-CoV-2 virus, when present at concentrations above 3.6E+04 copies/mL, can inhibit the detection and amplification of influenza A and influenza B virus RNA if present at or below 1.8E+02 copies/mL or 4.9E+02 copies/mL, respectively, and may lead to false negative influenza virus results. If co-infection with influenza A or influenza B virus is suspected in samples with a positive SARS-CoV-2 result, the sample should be re-tested with another FDA cleared, approved, or authorized influenza test, if influenza virus detection would change clinical management.            XR Chest 1 View    Result Date: 11/29/2023  XR CHEST 1 VW Date of Exam: 11/29/2023 2:03 PM EST Indication: Weak/Dizzy/AMS triage protocol Comparison: 11/23/2022 Findings: Previously seen pulmonary vascular congestion and bibasilar atelectasis has resolved. There is stable moderate cardiomegaly. Pulmonary vessels appear within normal limits on the current exam. Lung fields are clear. There are no effusions.    Impression: Impression: No acute process. Stable cardiomegaly. Electronically Signed: Bella Chapman MD  11/29/2023 2:24 PM EST  Workstation ID: [id number]    CT Head Without Contrast    Result Date: 11/28/2023  FINAL REPORT TECHNIQUE: Routine axial images through the head were obtained without contrast. CLINICAL HISTORY: Positive LOC with fall FINDINGS: There is  mild  generalized cerebral atrophy.  The ventricles are dilated, but proportionate to the degree of atrophy.  There is no evidence of hemorrhage, mass or other acute abnormality.  No acute osseous or sinus abnormality is seen. There is no skull fracture.     Impression: No acute intracranial abnormality. Authenticated and Electronically Signed by Willis Quiñones M.D. on 11/28/2023 07:42:04 PM     Results for orders placed during the hospital encounter of 10/25/22    Adult Transthoracic Echo Complete With Contrast if Necessary Per Protocol    Interpretation Summary  •  Left ventricular wall thickness is consistent with mild concentric hypertrophy.  •  The right ventricular cavity is dilated.  •  Left atrial volume is moderately increased.  •  There is a bioprosthetic aortic valve present.  •  Estimated right ventricular systolic pressure from tricuspid regurgitation is mildly elevated (35-45 mmHg). Calculated right ventricular systolic pressure from tricuspid regurgitation is 36 mmHg.  •  There is a prosthetic pulmonic valve present.  •  There is a large left pleural effusion.  •  Mild global LV hypokinesis  •  Ejection fraction estimated at 45 to 50%  •  Mild to moderate AI  •  Mild MR      Assessment & Plan   Assessment & Plan       Upper respiratory tract infection due to COVID-19 virus    Essential hypertension    Type 2 diabetes mellitus with hyperglycemia, with long-term current use of insulin    Sleep apnea    HLD (hyperlipidemia)    Stage 3b chronic kidney disease    Blane Dietz is a 88 y.o. 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 Ramon for continued weakness. Patient was seen in [facility] ED yesterday for weakness and a fall at home. Today, his daughter called Dr. Ramon's office after  patient slid out of bed trying to stand today at his assisted-living facility and had to have EMS get him off the floor, and daughter thought it might be related to chemotherapy. Patient presented to [facility] ED and was found to be positive for Covid-19.     Covid-19   Weakness, fall at home  -dx on 11/29, symptom onset 11/28  -WBC wnl but on abx, afebrile  -defer remdesevir as patient is on room air, minimally symptomatic, has one kidney and known CKD, elevated alk phos and bili  -defer dexamethasone as pt on room air  -O2 as needed for SOA to keep sat > 90%  -supportive care with scheduled Mucinex, prn tessalon perles, prn Robitussin  -CT head negative at [facility]  -PT and OT consult  -CM consult, pt from assisted living  -check baseline Covid labs-CRP, ferritin  -daily CBC w/ diff, CMP    Elevated troponin  -troponin 32, likely r/t CKD/supply-demand mismatch  -repeat troponin pending  -pt denies chest pain  -EKG strip not currently uploaded in Epic, reads as SSR with RBB    Elevated creatinine  CKD III  H/o right nephrectomy  -baseline ~1.45-1.5  -creatinine 1.64  -gentle IV fluids x 16 hours  -renally dosed allopurinol, heparin for dvt ppx  -AM CMP    Thrombocytopenia  -on chemotherapy, drop from 148 on 11/1 >> 88 today  -Lovenox for DVT ppx, CrCl acceptable    Prostate cancer with bone metastasis  H/o PEs  -hold home chemo meds  -follows with Dr. Ramon  -previously anticoagulated with Eliquis  -Lovenox for DVT ppx given h/o cancer    CHF  A-fib  H/o AVR  -last echo 10/2022 with EF 45-50%, mild concentric hypertrophy  -continue Entresto, Jardiance, metoprolol  -continue amiodarone  -free T4 elevated 2.06 but TSH wnl, recommend f/u with endocrinology as pt has known h/o a-fib    Prolonged QTc  -QTc 526  -patient on amiodarone, has RBB at baseline  -avoid QT-prolonging meds, dexamethasone prn for nausea    Left shin wound  Skin abrasions LLE and left elbow  -continue Bactrim and clindamycin  -add probiotic  -WOC  consult    HTN  HLD  -continue Entresto, metoprolol, statin    DM2  -A1c 8.8  -follow with endocrinology, on Trulicity 1.5 mg weekly, Lantus 50 units daily, and Jardiance 25 mg daily at home  -start basal bolus regimen with Levemir 20 units daily plus SSI  -continue Jardiance    HUSSAIN  -CPAP      DVT prophylaxis:  Medical    CODE STATUS:    Medical Intervention Limits: NO intubation (DNI); NO artificial nutrition  Code Status (Patient has no pulse and is not breathing): CPR (Attempt to Resuscitate)  Medical Interventions (Patient has pulse or is breathing): Limited Support      Expected Discharge  Expected Discharge Date: 12/2/2023; Expected Discharge Time:       Signature: Electronically signed by HIPOLITO Pierson, 11/29/23, 5:13 PM EST    Patient seen and examined at the bedside.  Patient is a 88-year-old  male with past medical history significant for prostate cancer with metastasis to the bones, diffuse large B cell lymphoma for which patient follows with Dr. Dove, hypertension, hyperlipidemia, type 2 diabetes mellitus, chronic congestive heart failure, atrial fibrillation on Eliquis and chronic kidney disease.  Patient was seen at the local emergency room yesterday after he had a fall and hit his head against the floor and had some left arm skin abrasions.  Patient was evaluated at the emergency room and then was sent home.  Evidently patient had another fall today or sled down to the floor from bed and as a result patient was brought to the emergency room here at [facility] at the recommendation of Dr. Dove.  Here initial evaluation at the emergency room revealed that patient has COVID 19 infection.  Respiratory wise patient is asymptomatic.  No cough and saturating in upper 90s on room air.    Physical exam:  Constitutional: No acute distress, awake, alert  HENT: NCAT, mucous membranes moist  Respiratory: Clear to auscultation bilaterally, respiratory effort normal    Cardiovascular: RRR, no murmurs, rubs, or gallops  Gastrointestinal: Positive bowel sounds, soft, nontender, nondistended  Musculoskeletal: No bilateral ankle edema  Psychiatric: Appropriate affect, cooperative  Neurologic: Oriented x 3, strength symmetric in all extremities, Cranial Nerves grossly intact to confrontation, speech clear  Skin: No rashes     Assessment and plan:  88-year-old male with history of prostate cancer with metastasis to the bones, diffuse large B cell lymphoma, recent weakness and falls.  Patient was brought to the emergency room here admitted to [facility] and initial evaluation revealed that patient is positive for COVID-19.  However, patient does not have any respiratory symptoms.  He is saturating well on room air.  Will admit the patient has inpatient.  Please see the above for more details.    Total time spent was 75 minutes.

## 2023-11-29 NOTE — TELEPHONE ENCOUNTER
RN called patient's daughter back. Daughter states that patient is so weak that he cannot get out of bed, and he fell out of bed again this morning in his assisted living facility. RN spoke with Dr. Ramon who recommends that patient go back to the ER. Patients daughter does not want to go back to ER in [place] - let her know Dr. Ramon recommends the patient present to the [facility] ER. Patients daughter v/u and appreciation.

## 2023-11-29 NOTE — CASE MANAGEMENT/SOCIAL WORK
Discharge Planning Assessment  [facility]     Patient Name: Blane Dietz  MRN: [id number]  Today's Date: 11/29/2023    Admit Date: 11/29/2023    Plan: Home   Discharge Needs Assessment       Row Name 11/29/23 1537       Living Environment    People in Home facility resident    Current Living Arrangements assisted living facility    Primary Care Provided by self    Provides Primary Care For no one    Family Caregiver if Needed child(abi), adult    Family Caregiver Names Susana Dietz, linda    Quality of Family Relationships helpful;involved;supportive    Able to Return to Prior Arrangements yes       Transition Planning    Patient/Family Anticipates Transition to home;home with help/services;inpatient rehabilitation facility    Patient/Family Anticipated Services at Transition        Discharge Needs Assessment    Equipment Currently Used at Home walker, rolling;cpap    Concerns to be Addressed denies needs/concerns at this time    Equipment Needed After Discharge none    Outpatient/Agency/Support Group Needs assisted living facility    Discharge Coordination/Progress Currently residing at [facility] in [place], needs assistance at this time with ADLs. Has a walker and cpap.  Referral sent to [facility] - hasn't been admitted to their service just yet.  Daughter Susana is POA.                   Discharge Plan       Row Name 11/29/23 1543       Plan    Plan Home    Patient/Family in Agreement with Plan yes    Plan Comments I spoke with daughter/POGOKUL Meza over the phone as Mr Dietz is Covid +.  Mr Dietz is a resident of [facility], an assisted living facility in [place], [place].  He currently needs assistance with ADLs.  He has a walker and cpap that he uses.  He was going to be admitted to [facility] tomorrow, however is currently being admitted to the hospital.  I have spoken with Jeronimo, and she is aware of admission. His insurance is [employer], and Susana denies any  issues or lapses in coverage.  His PCP is David Em, and he gets his prescriptions filled at [facility] in [place].  At this time, there are no discharge needs.    Final Discharge Disposition Code 01 - home or self-care                  Continued Care and Services - Admitted Since 11/29/2023    Coordination has not been started for this encounter.       Selected Continued Care - Episodes Includes continued care and service providers with selected services from the active episodes listed below      Oncology Episode start date: 9/11/2023   There are no active outsourced providers for this episode.                    Demographic Summary    No documentation.                  Functional Status       Row Name 11/29/23 1537       Functional Status    Usual Activity Tolerance fair    Current Activity Tolerance fair       Functional Status, IADL    Medications assistive equipment and person    Meal Preparation assistive equipment and person    Housekeeping assistive equipment and person    Laundry assistive equipment and person    Shopping assistive equipment and person                   Psychosocial    No documentation.                  Abuse/Neglect    No documentation.                  Legal    No documentation.                  Substance Abuse    No documentation.                  Patient Forms    No documentation.                     Jessica Sherman RN

## 2023-11-29 NOTE — ED PROVIDER NOTES
[place]    EMERGENCY DEPARTMENT ENCOUNTER      Pt Name: Blane Dietz  MRN: [id number]  YOB: 1935  Date of evaluation: 11/29/2023  Provider: Jono Mckinney MD    CHIEF COMPLAINT       Chief Complaint   Patient presents with    frequent falls         HISTORY OF PRESENT ILLNESS   Blane Dietz is a 88 y.o. male who presents to the emergency department with complaint of frequent falls. Pt has hx of abdominal CA for which he is currently on therapy. Pt is normally ambulatory but has gotten so weak over the past few days that he cannot get out of bed. Was evaluated at [facility] last night with an unremarkable workup and was discharged home.       Nursing notes were reviewed.    REVIEW OF SYSTEMS     ROS:  A chief complaint appropriate review of systems was completed and is negative except as noted in the HPI.      PAST MEDICAL HISTORY     Past Medical History:   Diagnosis Date    Aortic stenosis     status post ROSS procedure, remote, with December 2015 echocardiography revealing normal aortic and pulmonary valve function, normal ejection fraction and mild to moderate MR    Arthritis     Bilateral impacted cerumen 11/23/2016    Bronchitis     CHF (congestive heart failure)     Chronic gout of right foot 06/13/2016    Impression: 03/08/2016 - stable.;     COVID-19 vaccine series completed 05/12/2021    [place] 2nd dose 3/12/21.    Depression     Diabetes mellitus     Diverticulitis     Exogenous obesity     Gout     Heart murmur     History of vitamin D deficiency     Hypercholesteremia 08/11/2016    Hyperlipidemia     Hypertension     Kidney lesion     Malignant neoplasm of prostate     Morbid obesity 08/11/2016    Pneumonia 05/12/2021    Primary osteoarthritis involving multiple joints 06/13/2016    Impression: 03/08/2016 - stable;     Pulmonary embolism     Sleep apnea 05/12/2021    C-Pap    Uncontrolled type 2 diabetes mellitus with hyperglycemia 06/13/2016    Impression: 03/08/2016 - seeing Endo .;      Vertigo          SURGICAL HISTORY       Past Surgical History:   Procedure Laterality Date    CARDIAC VALVE REPLACEMENT  05/12/2021    Ross procedure 22 years ago    COLON SURGERY      COLONOSCOPY      COLOSTOMY  1999    COLOSTOMY REVISION      CYSTOSCOPY N/A 12/7/2021    Procedure: CYSTOSCOPY FLEXIBLE;  Surgeon: José Miguel Villafuerte Jr., MD;  Location:  [facility];  Service: Urology;  Laterality: N/A;    EXPLORATORY LAPAROTOMY      With Tissue Removal    LIPOMA EXCISION      MOHS SURGERY      NEPHROURETERECTOMY Right 12/7/2021    Procedure: RIGHT NEPHROURETERECTOMY LAPAROSCOPIC WITH DAVINCI ROBOT;  Surgeon: José Miguel Villafuerte Jr., MD;  Location:  [facility];  Service: Robotics - DaVinci;  Laterality: Right;    OTHER SURGICAL HISTORY      Porcine Valve    PROSTATE SURGERY      PROSTATECTOMY  2000     History of Prostatectomy Perineal Radical    SKIN CANCER EXCISION      from head and lip    TONSILLECTOMY           CURRENT MEDICATIONS       Current Facility-Administered Medications:     acetaminophen (TYLENOL) tablet 650 mg, 650 mg, Oral, Q4H PRN, LambertYaniqueSasha E, APRN    allopurinol (ZYLOPRIM) tablet 100 mg, 100 mg, Oral, Daily, Jenelle Eduardot E, APRN, 100 mg at 12/02/23 1031    aluminum-magnesium hydroxide-simethicone (MAALOX MAX) 400-400-40 MG/5ML suspension 15 mL, 15 mL, Oral, Q6H PRN, KehindeertYaniqueSasha E, APRN    amiodarone (PACERONE) tablet 200 mg, 200 mg, Oral, Daily, Yanique Eduardoaret E, APRN, 200 mg at 12/02/23 1031    benzocaine-menthol (CEPACOL) lozenge 1 each, 1 lozenge, Mouth/Throat, Q2H PRN, Ranjana, Paige, APRN, 1 each at 11/30/23 1152    benzonatate (TESSALON) capsule 100 mg, 100 mg, Oral, TID PRN, Jenelle Eduardot E, APRN, 100 mg at 12/02/23 1032    sennosides-docusate (PERICOLACE) 8.6-50 MG per tablet 2 tablet, 2 tablet, Oral, BID, 2 tablet at 12/01/23 2109 **AND** polyethylene glycol (MIRALAX) packet 17 g, 17 g, Oral, Daily PRN **AND** bisacodyl (DULCOLAX) EC tablet 5 mg, 5 mg, Oral,  Daily PRN **AND** bisacodyl (DULCOLAX) suppository 10 mg, 10 mg, Rectal, Daily PRN, Sasha Eduardo, APRN    calcium carbonate (TUMS) chewable tablet 500 mg (200 mg elemental), 2 tablet, Oral, BID PRN, Jenelle Eduardot E, APRN    clindamycin (CLEOCIN) capsule 300 mg, 300 mg, Oral, Q8H, Sasha Eduardo, APRN, 300 mg at 12/02/23 0549    dexAMETHasone (DECADRON) injection 4 mg, 4 mg, Intravenous, Q6H PRN, Sasha Eduardo, APRN    dextrose (D50W) (25 g/50 mL) IV injection 25 g, 25 g, Intravenous, Q15 Min PRN, Sasha Eduardo, APRABEBA    dextrose (GLUTOSE) oral gel 15 g, 15 g, Oral, Q15 Min PRN, Sasha Eduardo E, APRN    empagliflozin (JARDIANCE) tablet 25 mg, 25 mg, Oral, Daily, Sasha Eduardo, APRN, 25 mg at 12/02/23 1031    Enoxaparin Sodium (LOVENOX) syringe 40 mg, 40 mg, Subcutaneous, Daily, Sasha Eduardo, APRN, 40 mg at 12/02/23 1030    fluticasone (FLONASE) 50 MCG/ACT nasal spray 2 spray, 2 spray, Nasal, Daily PRN, Jenelle Eduardot E, APRN    glucagon (GLUCAGEN) injection 1 mg, 1 mg, Intramuscular, Q15 Min PRN, Sasha Eduardo, APRN    guaiFENesin (MUCINEX) 12 hr tablet 600 mg, 600 mg, Oral, Q12H, Jenelle Eduardot MARILIN, APRN, 600 mg at 12/02/23 1031    guaiFENesin-dextromethorphan (ROBITUSSIN DM) 100-10 MG/5ML syrup 5 mL, 5 mL, Oral, Q4H PRN, Sasha Eduardo, APRN, 5 mL at 12/02/23 1034    insulin detemir (LEVEMIR) injection 20 Units, 20 Units, Subcutaneous, Daily, Sasha Eduardo, APRN, 20 Units at 12/02/23 1033    Insulin Lispro (humaLOG) injection 2-9 Units, 2-9 Units, Subcutaneous, 4x Daily AC & at Bedtime, Ssaha Eduardo APRN, 2 Units at 12/01/23 2110    metoprolol succinate XL (TOPROL-XL) 24 hr tablet 12.5 mg, 12.5 mg, Oral, Daily, Sasha Eduardo APRN    nitroglycerin (NITROSTAT) SL tablet 0.4 mg, 0.4 mg, Sublingual, Q5 Min PRN, Sasha Eduardo APRN    pravastatin (PRAVACHOL) tablet 40 mg, 40 mg, Oral, Nightly, Sasha Eduardo APRN, 40 mg at  12/01/23 2109    saccharomyces boulardii (FLORASTOR) capsule 250 mg, 250 mg, Oral, BID, Lambert, Sasha E, APRN, 250 mg at 12/02/23 1031    sacubitril-valsartan (ENTRESTO) 24-26 MG tablet 1 tablet, 1 tablet, Oral, Q12H, Lambert, Sasha E, APRN, 1 tablet at 12/02/23 1033    sodium chloride 0.9 % flush 10 mL, 10 mL, Intravenous, PRN, Jono Mckinney MD    sodium chloride 0.9 % flush 10 mL, 10 mL, Intravenous, Q12H, Lambert, Sasha E, APRN, 10 mL at 12/02/23 1032    sodium chloride 0.9 % flush 10 mL, 10 mL, Intravenous, PRN, Lambert, Sasha E, APRN    sodium chloride 0.9 % infusion 40 mL, 40 mL, Intravenous, PRN, Lambert Sasha E, APRN    Triple Antibiotic 5-400-5000 MG-UNIT ointment 1 application , 1 application , Apply externally, Once, Jono Mckinney MD    ALLERGIES     Ampicillin, Medrol [methylprednisolone], Myrbetriq [mirabegron], Penicillins, Bee venom, and Melatonin    FAMILY HISTORY       Family History   Problem Relation Age of Onset    Diabetes Mother     Lung cancer Mother     Cancer Mother     Heart disease Father     Breast cancer Other     Cancer Other     Hypertension Other     Migraines Other     Obesity Other     Diabetes Other           SOCIAL HISTORY       Social History     Socioeconomic History    Marital status: Single   Tobacco Use    Smoking status: Never     Passive exposure: Never    Smokeless tobacco: Never   Vaping Use    Vaping Use: Never used   Substance and Sexual Activity    Alcohol use: No    Drug use: No    Sexual activity: Defer         PHYSICAL EXAM    (up to 7 for level 4, 8 or more for level 5)     Vitals:    12/02/23 0446 12/02/23 0800 12/02/23 1000 12/02/23 1028   BP:    98/61   BP Location:    Right arm   Patient Position:    Lying   Pulse:  87 84 86   Resp: 18   18   Temp: 97.9 °F (36.6 °C)   97.4 °F (36.3 °C)   TempSrc: Oral   Axillary   SpO2:    97%   Weight:       Height:           General: Awake, alert, no acute distress.  HEENT: Conjunctivae  normal.  Neck: Trachea midline.  Cardiac: Heart regular rate, rhythm, no murmurs, rubs, or gallops  Lungs: Lungs are clear to auscultation, there is no wheezing, rhonchi, or rales. There is no use of accessory muscles.  Chest wall: There is no tenderness to palpation over the chest wall or over ribs  Abdomen: Abdomen is soft, nontender, nondistended. There are no firm or pulsatile masses, no rebound rigidity or guarding.   Musculoskeletal: No deformity.  Neuro: Alert and oriented x 4.  Dermatology: Skin is warm and dry  Psych: Mentation is grossly normal, cognition is grossly normal. Affect is appropriate.        DIAGNOSTIC RESULTS     EKG: All EKGs are interpreted by the Emergency Department Physician who either signs or Co-signs this chart in the absence of a cardiologist.    ECG 12 Lead ED Triage Standing Order; Weak / Dizzy / AMS   Preliminary Result   Test Reason : ED Triage Standing Order~   Blood Pressure :   */*   mmHG   Vent. Rate :  92 BPM     Atrial Rate :  92 BPM      P-R Int : 208 ms          QRS Dur : 178 ms       QT Int : 426 ms       P-R-T Axes :   * 129  64 degrees      QTc Int : 526 ms      Normal sinus rhythm   Right bundle branch block   Abnormal ECG   When compared with ECG of 29-OCT-2022 05:09,   Sinus rhythm has replaced Atrial fibrillation   Right bundle branch block is now present   Minimal criteria for Septal infarct are no longer present   Criteria for Inferior infarct are no longer present      Referred By: EDMD           Confirmed By:       SCANNED - TELEMETRY     Final Result            RADIOLOGY:   [x] Radiologist's Report Reviewed:  XR Chest 1 View   Final Result   Impression:   No acute process. Stable cardiomegaly.         Electronically Signed: Bella Chapman MD     11/29/2023 2:24 PM EST     Workstation ID: [id number]          I ordered and independently reviewed the above noted radiographic studies.        LABS:    I have reviewed and interpreted all of the currently available lab  results from this visit (if applicable):  Results for orders placed or performed during the hospital encounter of 11/29/23   COVID-19 and FLU A/B PCR, 1 HR TAT - Swab, Nasopharynx    Specimen: Nasopharynx; Swab   Result Value Ref Range    COVID19 Detected (C) Not Detected - Ref. Range    Influenza A PCR Not Detected Not Detected    Influenza B PCR Not Detected Not Detected   Comprehensive Metabolic Panel    Specimen: Blood   Result Value Ref Range    Glucose 202 (H) 65 - 99 mg/dL    BUN 28 (H) 8 - 23 mg/dL    Creatinine 1.64 (H) 0.76 - 1.27 mg/dL    Sodium 131 (L) 136 - 145 mmol/L    Potassium 5.2 3.5 - 5.2 mmol/L    Chloride 100 98 - 107 mmol/L    CO2 19.0 (L) 22.0 - 29.0 mmol/L    Calcium 8.3 (L) 8.6 - 10.5 mg/dL    Total Protein 7.0 6.0 - 8.5 g/dL    Albumin 3.9 3.5 - 5.2 g/dL    ALT (SGPT) 40 1 - 41 U/L    AST (SGOT) 55 (H) 1 - 40 U/L    Alkaline Phosphatase 219 (H) 39 - 117 U/L    Total Bilirubin 1.3 (H) 0.0 - 1.2 mg/dL    Globulin 3.1 gm/dL    A/G Ratio 1.3 g/dL    BUN/Creatinine Ratio 17.1 7.0 - 25.0    Anion Gap 12.0 5.0 - 15.0 mmol/L    eGFR 40.5 (L) >60.0 mL/min/1.73   Single High Sensitivity Troponin T    Specimen: Blood   Result Value Ref Range    HS Troponin T 32 (H) <22 ng/L   Magnesium    Specimen: Blood   Result Value Ref Range    Magnesium 2.5 (H) 1.6 - 2.4 mg/dL   Urinalysis With Microscopic If Indicated (No Culture) - Urine, Clean Catch    Specimen: Urine, Clean Catch   Result Value Ref Range    Color, UA Yellow Yellow, Straw    Appearance, UA Clear Clear    pH, UA <=5.0 5.0 - 8.0    Specific Gravity, UA >=1.030 1.001 - 1.030    Glucose, UA >=1000 mg/dL (3+) (A) Negative    Ketones, UA Trace (A) Negative    Bilirubin, UA Negative Negative    Blood, UA Negative Negative    Protein, UA Negative Negative    Leuk Esterase, UA Negative Negative    Nitrite, UA Negative Negative    Urobilinogen, UA 1.0 E.U./dL 0.2 - 1.0 E.U./dL   CBC Auto Differential    Specimen: Blood   Result Value Ref Range    WBC  9.42 3.40 - 10.80 10*3/mm3    RBC 4.75 4.14 - 5.80 10*6/mm3    Hemoglobin 14.3 13.0 - 17.7 g/dL    Hematocrit 44.7 37.5 - 51.0 %    MCV 94.1 79.0 - 97.0 fL    MCH 30.1 26.6 - 33.0 pg    MCHC 32.0 31.5 - 35.7 g/dL    RDW 15.3 12.3 - 15.4 %    RDW-SD 53.1 37.0 - 54.0 fl    MPV 10.4 6.0 - 12.0 fL    Platelets 88 (L) 140 - 450 10*3/mm3   TSH    Specimen: Blood   Result Value Ref Range    TSH 1.470 0.270 - 4.200 uIU/mL   T4, Free    Specimen: Blood   Result Value Ref Range    Free T4 2.06 (H) 0.93 - 1.70 ng/dL   Manual Differential    Specimen: Blood   Result Value Ref Range    Neutrophil % 78.0 (H) 42.7 - 76.0 %    Lymphocyte % 2.0 (L) 19.6 - 45.3 %    Monocyte % 7.0 5.0 - 12.0 %    Eosinophil % 1.0 0.3 - 6.2 %    Basophil % 2.0 (H) 0.0 - 1.5 %    Bands %  2.0 0.0 - 5.0 %    Metamyelocyte % 4.0 (H) 0.0 - 0.0 %    Atypical Lymphocyte % 4.0 0.0 - 5.0 %    Neutrophils Absolute 7.54 (H) 1.70 - 7.00 10*3/mm3    Lymphocytes Absolute 0.57 (L) 0.70 - 3.10 10*3/mm3    Monocytes Absolute 0.66 0.10 - 0.90 10*3/mm3    Eosinophils Absolute 0.09 0.00 - 0.40 10*3/mm3    Basophils Absolute 0.19 0.00 - 0.20 10*3/mm3    Anisocytosis Slight/1+ None Seen    Spherocytes Slight/1+ None Seen    WBC Morphology Normal Normal    Platelet Estimate Decreased Normal   High Sensitivity Troponin T    Specimen: Blood   Result Value Ref Range    HS Troponin T 25 (H) <22 ng/L   High Sensitivity Troponin T 2Hr    Specimen: Blood   Result Value Ref Range    HS Troponin T 27 (H) <22 ng/L    Troponin T Delta -5 (L) >=-4 - <+4 ng/L   C-reactive Protein    Specimen: Blood   Result Value Ref Range    C-Reactive Protein 2.91 (H) 0.00 - 0.50 mg/dL   Ferritin    Specimen: Blood   Result Value Ref Range    Ferritin 290.50 30.00 - 400.00 ng/mL   CBC Auto Differential    Specimen: Blood   Result Value Ref Range    WBC 8.48 3.40 - 10.80 10*3/mm3    RBC 3.84 (L) 4.14 - 5.80 10*6/mm3    Hemoglobin 11.8 (L) 13.0 - 17.7 g/dL    Hematocrit 37.1 (L) 37.5 - 51.0 %    MCV  96.6 79.0 - 97.0 fL    MCH 30.7 26.6 - 33.0 pg    MCHC 31.8 31.5 - 35.7 g/dL    RDW 15.4 12.3 - 15.4 %    RDW-SD 54.5 (H) 37.0 - 54.0 fl    MPV 10.3 6.0 - 12.0 fL    Platelets 67 (L) 140 - 450 10*3/mm3    Neutrophil % 68.2 42.7 - 76.0 %    Lymphocyte % 11.0 (L) 19.6 - 45.3 %    Monocyte % 10.5 5.0 - 12.0 %    Eosinophil % 1.2 0.3 - 6.2 %    Basophil % 1.7 (H) 0.0 - 1.5 %    Immature Grans % 7.4 (H) 0.0 - 0.5 %    Neutrophils, Absolute 5.79 1.70 - 7.00 10*3/mm3    Lymphocytes, Absolute 0.93 0.70 - 3.10 10*3/mm3    Monocytes, Absolute 0.89 0.10 - 0.90 10*3/mm3    Eosinophils, Absolute 0.10 0.00 - 0.40 10*3/mm3    Basophils, Absolute 0.14 0.00 - 0.20 10*3/mm3    Immature Grans, Absolute 0.63 (H) 0.00 - 0.05 10*3/mm3    nRBC 0.0 0.0 - 0.2 /100 WBC   CBC Auto Differential    Specimen: Blood   Result Value Ref Range    WBC 8.03 3.40 - 10.80 10*3/mm3    RBC 4.49 4.14 - 5.80 10*6/mm3    Hemoglobin 13.6 13.0 - 17.7 g/dL    Hematocrit 41.0 37.5 - 51.0 %    MCV 91.3 79.0 - 97.0 fL    MCH 30.3 26.6 - 33.0 pg    MCHC 33.2 31.5 - 35.7 g/dL    RDW 15.4 12.3 - 15.4 %    RDW-SD 51.8 37.0 - 54.0 fl    MPV 10.8 6.0 - 12.0 fL    Platelets 85 (L) 140 - 450 10*3/mm3   Comprehensive Metabolic Panel    Specimen: Blood   Result Value Ref Range    Glucose 93 65 - 99 mg/dL    BUN 30 (H) 8 - 23 mg/dL    Creatinine 1.52 (H) 0.76 - 1.27 mg/dL    Sodium 132 (L) 136 - 145 mmol/L    Potassium 4.4 3.5 - 5.2 mmol/L    Chloride 102 98 - 107 mmol/L    CO2 20.0 (L) 22.0 - 29.0 mmol/L    Calcium 8.0 (L) 8.6 - 10.5 mg/dL    Total Protein 6.1 6.0 - 8.5 g/dL    Albumin 3.3 (L) 3.5 - 5.2 g/dL    ALT (SGPT) 32 1 - 41 U/L    AST (SGOT) 49 (H) 1 - 40 U/L    Alkaline Phosphatase 187 (H) 39 - 117 U/L    Total Bilirubin 0.9 0.0 - 1.2 mg/dL    Globulin 2.8 gm/dL    A/G Ratio 1.2 g/dL    BUN/Creatinine Ratio 19.7 7.0 - 25.0    Anion Gap 10.0 5.0 - 15.0 mmol/L    eGFR 44.3 (L) >60.0 mL/min/1.73   Manual Differential    Specimen: Blood   Result Value Ref Range     Neutrophil % 73.0 42.7 - 76.0 %    Lymphocyte % 11.0 (L) 19.6 - 45.3 %    Monocyte % 8.0 5.0 - 12.0 %    Eosinophil % 4.0 0.3 - 6.2 %    Basophil % 2.0 (H) 0.0 - 1.5 %    Bands %  1.0 0.0 - 5.0 %    Metamyelocyte % 1.0 (H) 0.0 - 0.0 %    Neutrophils Absolute 5.94 1.70 - 7.00 10*3/mm3    Lymphocytes Absolute 0.88 0.70 - 3.10 10*3/mm3    Monocytes Absolute 0.64 0.10 - 0.90 10*3/mm3    Eosinophils Absolute 0.32 0.00 - 0.40 10*3/mm3    Basophils Absolute 0.16 0.00 - 0.20 10*3/mm3    RBC Morphology Normal Normal    WBC Morphology Normal Normal    Platelet Morphology Normal Normal   CBC Auto Differential    Specimen: Blood   Result Value Ref Range    WBC 8.10 3.40 - 10.80 10*3/mm3    RBC 4.19 4.14 - 5.80 10*6/mm3    Hemoglobin 12.9 (L) 13.0 - 17.7 g/dL    Hematocrit 39.9 37.5 - 51.0 %    MCV 95.2 79.0 - 97.0 fL    MCH 30.8 26.6 - 33.0 pg    MCHC 32.3 31.5 - 35.7 g/dL    RDW 15.7 (H) 12.3 - 15.4 %    RDW-SD 54.8 (H) 37.0 - 54.0 fl    MPV 11.1 6.0 - 12.0 fL    Platelets 90 (L) 140 - 450 10*3/mm3   Comprehensive Metabolic Panel    Specimen: Blood   Result Value Ref Range    Glucose 97 65 - 99 mg/dL    BUN 41 (H) 8 - 23 mg/dL    Creatinine 1.71 (H) 0.76 - 1.27 mg/dL    Sodium 136 136 - 145 mmol/L    Potassium 4.6 3.5 - 5.2 mmol/L    Chloride 108 (H) 98 - 107 mmol/L    CO2 16.0 (L) 22.0 - 29.0 mmol/L    Calcium 7.7 (L) 8.6 - 10.5 mg/dL    Total Protein 5.7 (L) 6.0 - 8.5 g/dL    Albumin 3.5 3.5 - 5.2 g/dL    ALT (SGPT) 28 1 - 41 U/L    AST (SGOT) 45 (H) 1 - 40 U/L    Alkaline Phosphatase 195 (H) 39 - 117 U/L    Total Bilirubin 0.8 0.0 - 1.2 mg/dL    Globulin 2.2 gm/dL    A/G Ratio 1.6 g/dL    BUN/Creatinine Ratio 24.0 7.0 - 25.0    Anion Gap 12.0 5.0 - 15.0 mmol/L    eGFR 38.5 (L) >60.0 mL/min/1.73   Manual Differential    Specimen: Blood   Result Value Ref Range    Neutrophil % 45.0 42.7 - 76.0 %    Lymphocyte % 21.0 19.6 - 45.3 %    Monocyte % 11.0 5.0 - 12.0 %    Eosinophil % 4.0 0.3 - 6.2 %    Basophil % 3.0 (H) 0.0 - 1.5 %     Bands %  6.0 (H) 0.0 - 5.0 %    Metamyelocyte % 2.0 (H) 0.0 - 0.0 %    Myelocyte % 5.0 (H) 0.0 - 0.0 %    Atypical Lymphocyte % 3.0 0.0 - 5.0 %    Neutrophils Absolute 4.13 1.70 - 7.00 10*3/mm3    Lymphocytes Absolute 1.94 0.70 - 3.10 10*3/mm3    Monocytes Absolute 0.89 0.10 - 0.90 10*3/mm3    Eosinophils Absolute 0.32 0.00 - 0.40 10*3/mm3    Basophils Absolute 0.24 (H) 0.00 - 0.20 10*3/mm3    nRBC 0.0 0.0 - 0.2 /100 WBC    Smudge Cells 2.0 /100 WBC    Ketty Cells Slight/1+ None Seen    Smudge Cells Slight/1+ None Seen    Platelet Morphology Normal Normal   Calcium, Ionized    Specimen: Blood   Result Value Ref Range    Ionized Calcium 1.14 1.12 - 1.32 mmol/L   Comprehensive Metabolic Panel    Specimen: Blood   Result Value Ref Range    Glucose 93 65 - 99 mg/dL    BUN 39 (H) 8 - 23 mg/dL    Creatinine 1.69 (H) 0.76 - 1.27 mg/dL    Sodium 135 (L) 136 - 145 mmol/L    Potassium 4.8 3.5 - 5.2 mmol/L    Chloride 107 98 - 107 mmol/L    CO2 17.0 (L) 22.0 - 29.0 mmol/L    Calcium 8.0 (L) 8.6 - 10.5 mg/dL    Total Protein 6.1 6.0 - 8.5 g/dL    Albumin 3.2 (L) 3.5 - 5.2 g/dL    ALT (SGPT) 24 1 - 41 U/L    AST (SGOT) 40 1 - 40 U/L    Alkaline Phosphatase 192 (H) 39 - 117 U/L    Total Bilirubin 0.7 0.0 - 1.2 mg/dL    Globulin 2.9 gm/dL    A/G Ratio 1.1 g/dL    BUN/Creatinine Ratio 23.1 7.0 - 25.0    Anion Gap 11.0 5.0 - 15.0 mmol/L    eGFR 39.1 (L) >60.0 mL/min/1.73   POC Glucose Once    Specimen: Blood   Result Value Ref Range    Glucose 153 (H) 70 - 130 mg/dL   POC Glucose Once    Specimen: Blood   Result Value Ref Range    Glucose 99 70 - 130 mg/dL   POC Glucose Once    Specimen: Blood   Result Value Ref Range    Glucose 161 (H) 70 - 130 mg/dL   POC Glucose Once    Specimen: Blood   Result Value Ref Range    Glucose 151 (H) 70 - 130 mg/dL   POC Glucose Once    Specimen: Blood   Result Value Ref Range    Glucose 104 70 - 130 mg/dL   POC Glucose Once    Specimen: Blood   Result Value Ref Range    Glucose 192 (H) 70 - 130  mg/dL   POC Glucose Once    Specimen: Blood   Result Value Ref Range    Glucose 186 (H) 70 - 130 mg/dL   ECG 12 Lead ED Triage Standing Order; Weak / Dizzy / AMS   Result Value Ref Range    QT Interval 426 ms    QTC Interval 526 ms   Green Top (Gel)   Result Value Ref Range    Extra Tube Hold for add-ons.    Lavender Top   Result Value Ref Range    Extra Tube hold for add-on    Gold Top - SST   Result Value Ref Range    Extra Tube Hold for add-ons.    Gray Top   Result Value Ref Range    Extra Tube Hold for add-ons.    Light Blue Top   Result Value Ref Range    Extra Tube Hold for add-ons.         If labs were ordered, I independently reviewed the results and considered them in treating the patient.      EMERGENCY DEPARTMENT COURSE and DIFFERENTIAL DIAGNOSIS/MDM:   Vitals:  AS OF 12:12 EST    BP - 98/61  HR - 86  TEMP - 97.4 °F (36.3 °C) (Axillary)  O2 SATS - 97%        Discussion below represents my analysis of pertinent findings related to patient's condition, differential diagnosis, treatment plan and final disposition.      Differential diagnosis:  The differential diagnosis associated with the patient's presentation includes: UTI, PNA, dehydration, electroltye derangement, ACS, anemia      Independent interpretations (ECG/rhythm strip/X-ray/US/CT scan): I independently interpreted the pt CXR and cardiac monitor - no infiltrate and the pt is in NSR.      Additional sources:  Discussed/obtained information from independent historians:   [] Spouse:   [] Parent:   [] Friend:   [x] EMS: Report was taken from  EMS - [facility] during transport   [] Other:  External (non-ED) record review:   [] Inpatient record:   [] Office record:   [] Outpatient record:   [] Prior Outpatient labs:   [] Prior Outpatient radiology:   [] Primary Care record:   [x] Outside ED record: Reviewed [facility] record from last night. Pt w/ unremarkable workup including head CT.   [] Other:       Patient's care impacted by:   [x] Diabetes   [x]  Hypertension   [] Coronary Artery Disease   [] Cancer   [x] Other: CHF    Care significantly affected by Social Determinants of Health (housing and economic circumstances, unemployment)    [x] Yes     [] No   If yes, Patient's care significantly limited by  Social Determinants of Health including:    [] Inadequate housing    [] Low income    [] Alcoholism and drug addiction in family    [] Problems related to primary support group    [] Unemployment    [] Problems related to employment    [x] Other Social Determinants of Health: Pt in assisted living with minimal support      Consideration of admission/observation vs discharge: Pt w/ significant general weakness and given current living condition he is not safe for discharge.      Additional orders considered but not ordered:  The following testing was considered but ultimately not selected after discussion with patient/family: Considered head CT, however pt just had one last night and has had no alteration in mental status, headache, or injury.    ED Course:    ED Course as of 12/02/23 1212   Wed Nov 29, 2023   1516 I spoke with hospitalist Dr. Wolf.  I discussed history, presentation, work-up.  Accepts patient for admission. [NS]      ED Course User Index  [NS] Jono Mckinney MD       PROCEDURES:  Procedures    CRITICAL CARE TIME        FINAL IMPRESSION      1. COVID-19    2. Generalized weakness    3. On antineoplastic chemotherapy    4. History of diabetes mellitus    5. Pneumonia due to COVID-19 virus    6. Upper respiratory tract infection due to COVID-19 virus    7. Stage 3b chronic kidney disease    8. Essential hypertension    9. Type 2 diabetes mellitus with hyperglycemia, with long-term current use of insulin          DISPOSITION/PLAN     ED Disposition       ED Disposition   Decision to Admit    Condition   --    Comment   Level of Care: Telemetry [5]   Diagnosis: Pneumonia due to COVID-19 virus [[id number]]   Admitting Physician: KISHOR  DARRIAN [[id number]]   Isolate for COVID?: Yes [1]   Certification: I Certify That Inpatient Hospital Services Are Medically Necessary For Greater Than 2 Midnights                   Comment: Please note this report has been produced using speech recognition software.      Jono Mckinney MD  Attending Emergency Physician             Jono Mckinney MD  12/02/23 1217

## 2023-11-29 NOTE — TELEPHONE ENCOUNTER
Patient's daughter called patient fell yesterday and went to the ER he hurt his elbow, and has an open wound. They didn't admit him, she states he is very weak, and won't be able to come today to appointment, and treatment. She wants to know if the chemo is making him weaker? Please call.

## 2023-11-30 PROBLEM — J12.82 PNEUMONIA DUE TO COVID-19 VIRUS: Status: ACTIVE | Noted: 2023-11-30

## 2023-11-30 PROBLEM — U07.1 PNEUMONIA DUE TO COVID-19 VIRUS: Status: ACTIVE | Noted: 2023-11-30

## 2023-11-30 LAB
ALBUMIN SERPL-MCNC: 3.3 G/DL (ref 3.5–5.2)
ALBUMIN/GLOB SERPL: 1.2 G/DL
ALP SERPL-CCNC: 187 U/L (ref 39–117)
ALT SERPL W P-5'-P-CCNC: 32 U/L (ref 1–41)
ANION GAP SERPL CALCULATED.3IONS-SCNC: 10 MMOL/L (ref 5–15)
AST SERPL-CCNC: 49 U/L (ref 1–40)
BASOPHILS # BLD MANUAL: 0.16 10*3/MM3 (ref 0–0.2)
BASOPHILS NFR BLD MANUAL: 2 % (ref 0–1.5)
BILIRUB SERPL-MCNC: 0.9 MG/DL (ref 0–1.2)
BILIRUB UR QL STRIP: NEGATIVE
BUN SERPL-MCNC: 30 MG/DL (ref 8–23)
BUN/CREAT SERPL: 19.7 (ref 7–25)
CALCIUM SPEC-SCNC: 8 MG/DL (ref 8.6–10.5)
CHLORIDE SERPL-SCNC: 102 MMOL/L (ref 98–107)
CLARITY UR: CLEAR
CO2 SERPL-SCNC: 20 MMOL/L (ref 22–29)
COLOR UR: YELLOW
CREAT SERPL-MCNC: 1.52 MG/DL (ref 0.76–1.27)
DEPRECATED RDW RBC AUTO: 51.8 FL (ref 37–54)
EGFRCR SERPLBLD CKD-EPI 2021: 44.3 ML/MIN/1.73
EOSINOPHIL # BLD MANUAL: 0.32 10*3/MM3 (ref 0–0.4)
EOSINOPHIL NFR BLD MANUAL: 4 % (ref 0.3–6.2)
ERYTHROCYTE [DISTWIDTH] IN BLOOD BY AUTOMATED COUNT: 15.4 % (ref 12.3–15.4)
GLOBULIN UR ELPH-MCNC: 2.8 GM/DL
GLUCOSE BLDC GLUCOMTR-MCNC: 104 MG/DL (ref 70–130)
GLUCOSE BLDC GLUCOMTR-MCNC: 151 MG/DL (ref 70–130)
GLUCOSE BLDC GLUCOMTR-MCNC: 161 MG/DL (ref 70–130)
GLUCOSE BLDC GLUCOMTR-MCNC: 99 MG/DL (ref 70–130)
GLUCOSE SERPL-MCNC: 93 MG/DL (ref 65–99)
GLUCOSE UR STRIP-MCNC: ABNORMAL MG/DL
HCT VFR BLD AUTO: 41 % (ref 37.5–51)
HGB BLD-MCNC: 13.6 G/DL (ref 13–17.7)
HGB UR QL STRIP.AUTO: NEGATIVE
KETONES UR QL STRIP: ABNORMAL
LEUKOCYTE ESTERASE UR QL STRIP.AUTO: NEGATIVE
LYMPHOCYTES # BLD MANUAL: 0.88 10*3/MM3 (ref 0.7–3.1)
LYMPHOCYTES NFR BLD MANUAL: 8 % (ref 5–12)
MCH RBC QN AUTO: 30.3 PG (ref 26.6–33)
MCHC RBC AUTO-ENTMCNC: 33.2 G/DL (ref 31.5–35.7)
MCV RBC AUTO: 91.3 FL (ref 79–97)
METAMYELOCYTES NFR BLD MANUAL: 1 % (ref 0–0)
MONOCYTES # BLD: 0.64 10*3/MM3 (ref 0.1–0.9)
NEUTROPHILS # BLD AUTO: 5.94 10*3/MM3 (ref 1.7–7)
NEUTROPHILS NFR BLD MANUAL: 73 % (ref 42.7–76)
NEUTS BAND NFR BLD MANUAL: 1 % (ref 0–5)
NITRITE UR QL STRIP: NEGATIVE
PH UR STRIP.AUTO: <=5 [PH] (ref 5–8)
PLAT MORPH BLD: NORMAL
PLATELET # BLD AUTO: 85 10*3/MM3 (ref 140–450)
PMV BLD AUTO: 10.8 FL (ref 6–12)
POTASSIUM SERPL-SCNC: 4.4 MMOL/L (ref 3.5–5.2)
PROT SERPL-MCNC: 6.1 G/DL (ref 6–8.5)
PROT UR QL STRIP: NEGATIVE
RBC # BLD AUTO: 4.49 10*6/MM3 (ref 4.14–5.8)
RBC MORPH BLD: NORMAL
SODIUM SERPL-SCNC: 132 MMOL/L (ref 136–145)
SP GR UR STRIP: >=1.03 (ref 1–1.03)
UROBILINOGEN UR QL STRIP: ABNORMAL
VARIANT LYMPHS NFR BLD MANUAL: 11 % (ref 19.6–45.3)
WBC MORPH BLD: NORMAL
WBC NRBC COR # BLD AUTO: 8.03 10*3/MM3 (ref 3.4–10.8)

## 2023-11-30 PROCEDURE — 94660 CPAP INITIATION&MGMT: CPT

## 2023-11-30 PROCEDURE — 97165 OT EVAL LOW COMPLEX 30 MIN: CPT

## 2023-11-30 PROCEDURE — 97162 PT EVAL MOD COMPLEX 30 MIN: CPT

## 2023-11-30 PROCEDURE — 25010000002 ENOXAPARIN PER 10 MG: Performed by: NURSE PRACTITIONER

## 2023-11-30 PROCEDURE — 85007 BL SMEAR W/DIFF WBC COUNT: CPT | Performed by: NURSE PRACTITIONER

## 2023-11-30 PROCEDURE — 81003 URINALYSIS AUTO W/O SCOPE: CPT | Performed by: EMERGENCY MEDICINE

## 2023-11-30 PROCEDURE — 63710000001 INSULIN DETEMIR PER 5 UNITS: Performed by: NURSE PRACTITIONER

## 2023-11-30 PROCEDURE — 25810000003 SODIUM CHLORIDE 0.9 % SOLUTION: Performed by: NURSE PRACTITIONER

## 2023-11-30 PROCEDURE — 82948 REAGENT STRIP/BLOOD GLUCOSE: CPT

## 2023-11-30 PROCEDURE — 94799 UNLISTED PULMONARY SVC/PX: CPT

## 2023-11-30 PROCEDURE — 96372 THER/PROPH/DIAG INJ SC/IM: CPT

## 2023-11-30 PROCEDURE — G0378 HOSPITAL OBSERVATION PER HR: HCPCS

## 2023-11-30 PROCEDURE — 63710000001 INSULIN LISPRO (HUMAN) PER 5 UNITS: Performed by: NURSE PRACTITIONER

## 2023-11-30 PROCEDURE — 99232 SBSQ HOSP IP/OBS MODERATE 35: CPT | Performed by: STUDENT IN AN ORGANIZED HEALTH CARE EDUCATION/TRAINING PROGRAM

## 2023-11-30 PROCEDURE — 80053 COMPREHEN METABOLIC PANEL: CPT | Performed by: NURSE PRACTITIONER

## 2023-11-30 PROCEDURE — 85025 COMPLETE CBC W/AUTO DIFF WBC: CPT | Performed by: NURSE PRACTITIONER

## 2023-11-30 RX ORDER — DEXAMETHASONE SODIUM PHOSPHATE 4 MG/ML
4 INJECTION, SOLUTION INTRA-ARTICULAR; INTRALESIONAL; INTRAMUSCULAR; INTRAVENOUS; SOFT TISSUE EVERY 6 HOURS PRN
Status: DISCONTINUED | OUTPATIENT
Start: 2023-11-30 | End: 2023-12-04 | Stop reason: HOSPADM

## 2023-11-30 RX ORDER — FLUTICASONE PROPIONATE 50 MCG
2 SPRAY, SUSPENSION (ML) NASAL DAILY PRN
Status: DISCONTINUED | OUTPATIENT
Start: 2023-11-30 | End: 2023-12-04 | Stop reason: HOSPADM

## 2023-11-30 RX ADMIN — EMPAGLIFLOZIN 25 MG: 25 TABLET, FILM COATED ORAL at 10:17

## 2023-11-30 RX ADMIN — PRAVASTATIN SODIUM 40 MG: 40 TABLET ORAL at 21:43

## 2023-11-30 RX ADMIN — BENZOCAINE AND MENTHOL 1 EACH: 15; 3.6 LOZENGE ORAL at 05:42

## 2023-11-30 RX ADMIN — CLINDAMYCIN HYDROCHLORIDE 300 MG: 150 CAPSULE ORAL at 05:42

## 2023-11-30 RX ADMIN — GUAIFENESIN 600 MG: 600 TABLET, EXTENDED RELEASE ORAL at 21:43

## 2023-11-30 RX ADMIN — SODIUM CHLORIDE 50 ML/HR: 9 INJECTION, SOLUTION INTRAVENOUS at 11:58

## 2023-11-30 RX ADMIN — Medication 10 ML: at 21:43

## 2023-11-30 RX ADMIN — SACUBITRIL AND VALSARTAN 1 TABLET: 24; 26 TABLET, FILM COATED ORAL at 21:42

## 2023-11-30 RX ADMIN — Medication 250 MG: at 10:17

## 2023-11-30 RX ADMIN — INSULIN DETEMIR 20 UNITS: 100 INJECTION, SOLUTION SUBCUTANEOUS at 10:18

## 2023-11-30 RX ADMIN — SACUBITRIL AND VALSARTAN 1 TABLET: 24; 26 TABLET, FILM COATED ORAL at 10:18

## 2023-11-30 RX ADMIN — ALLOPURINOL 100 MG: 100 TABLET ORAL at 10:16

## 2023-11-30 RX ADMIN — AMIODARONE HYDROCHLORIDE 200 MG: 200 TABLET ORAL at 10:16

## 2023-11-30 RX ADMIN — INSULIN LISPRO 2 UNITS: 100 INJECTION, SOLUTION INTRAVENOUS; SUBCUTANEOUS at 17:32

## 2023-11-30 RX ADMIN — INSULIN LISPRO 2 UNITS: 100 INJECTION, SOLUTION INTRAVENOUS; SUBCUTANEOUS at 11:57

## 2023-11-30 RX ADMIN — Medication 10 ML: at 10:20

## 2023-11-30 RX ADMIN — Medication 250 MG: at 21:42

## 2023-11-30 RX ADMIN — BENZOCAINE AND MENTHOL 1 EACH: 15; 3.6 LOZENGE ORAL at 11:52

## 2023-11-30 RX ADMIN — ENOXAPARIN SODIUM 40 MG: 40 INJECTION SUBCUTANEOUS at 10:19

## 2023-11-30 RX ADMIN — CLINDAMYCIN HYDROCHLORIDE 300 MG: 150 CAPSULE ORAL at 21:42

## 2023-11-30 RX ADMIN — CLINDAMYCIN HYDROCHLORIDE 300 MG: 150 CAPSULE ORAL at 15:19

## 2023-11-30 NOTE — NURSING NOTE
[facility] consult for:   Skin tear anterior left shin, currently being treated at [facility], on bactrim and clindamycin, shearing abrasions on later LLE and left elbow after fall     Patient has 2 new skin tears on the left lateral shin and left elbow from recent fall.  These were both superficial not painful were cleansed with Vashe with Xeroform/silicone foam dressing applied.  Recommend continuing these dressing changes every 3 day by nursing.    Left anterior shin wound that patient has received p.o. antibiotics for looks to be healing nicely.  Wound bed itself is bright red healthy appearing clean very superficial.  There is some maceration in the periwound.  There is some surrounding discoloration but does not appear to be related to acute infection could possibly be some sort of dermatitis from a dressing they are using versus venous insufficiency/hemosiderin staining or some sort of inflammation from the wound itself.  No malodor exudate warmth or pain noted.  Cleansed same as above measurements taken recommend same dressing changes as other skin tears.    Sign off.

## 2023-11-30 NOTE — CASE MANAGEMENT/SOCIAL WORK
"Continued Stay Note  [facility]     Patient Name: Blane Dietz  MRN: [id number]  Today's Date: 11/30/2023    Admit Date: 11/29/2023    Plan: TBD   Discharge Plan       Row Name 11/30/23 8471       Plan    Plan TBD    Patient/Family in Agreement with Plan unable to assess    Plan Comments Per MDR, Mr. Diezt may be ready for discharge in 1-2 days.  Nursing staff today report that he needs assist of 2 to ambulate.  PT notes state \"Patient presents below baseline for functional mobility. Patient demonstrates decreased activity tolerance, deconditioning and reduced dynamic balance.\"  He was able to walk 30 feet today w Contact guard assist.  I have spoken to Jenny at [facility] regarding criteria for return to their facility.  She states that upon his return, he will be confined to his room (covid isolation).  His need for close supervision r/t only walking 30 feet cannot be accomodated.  She suggests that we consider inpatient rehab prior to return to Assisted living accomodations.  She declines a face to face evaluation. I have attempted by phone to contact Susana Dietz to discuss inpatient rehab options.  She is not available, so I have requested a call back to CM to discuss the discharge plan.  CM will cont to follow plan of care, f/u with Susana (POA), await updated PT/OT notes and submit referrals to preferred rehab facilities as appropriate.    Final Discharge Disposition Code 30 - still a patient                   Discharge Codes    No documentation.                 Expected Discharge Date and Time       Expected Discharge Date Expected Discharge Time    Dec 2, 2023               Mariela Marcus RN    "

## 2023-11-30 NOTE — THERAPY EVALUATION
Patient Name: Blane Dietz  : 1935    MRN: [id number]                              Today's Date: 2023       Admit Date: 2023    Visit Dx:     ICD-10-CM ICD-9-CM   1. COVID-19  U07.1 079.89   2. Generalized weakness  R53.1 780.79   3. On antineoplastic chemotherapy  Z79.899 V58.69   4. 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     Diffuse large B-cell lymphoma of solid organ excluding spleen    PICC (peripherally inserted central catheter) flush    History of pulmonary embolism    History of malignant neoplasm of prostate    Lymphoma of kidney    Stage 3b chronic kidney disease    Upper respiratory tract infection due to COVID-19 virus    Pneumonia due to COVID-19 virus     Past Medical History:   Diagnosis Date    Aortic stenosis     status post ROSS procedure, remote, with 2015 echocardiography revealing normal aortic and pulmonary valve function, normal ejection fraction and mild to moderate MR    Arthritis     Bilateral impacted cerumen 2016    Bronchitis     CHF (congestive heart failure)     Chronic gout of right foot 2016    Impression: 2016 - stable.;     COVID-19 vaccine series completed 2021    Maderna 2nd dose 3/12/21.    Depression     Diabetes mellitus     Diverticulitis     Exogenous obesity     Gout     Heart murmur     History of vitamin D deficiency     Hypercholesteremia 2016    Hyperlipidemia     Hypertension     Kidney lesion     Malignant neoplasm of prostate     Morbid obesity 2016    Pneumonia 2021    Primary osteoarthritis involving multiple joints 2016    Impression: 2016 - stable;     Pulmonary embolism      Sleep apnea 05/12/2021    C-Pap    Uncontrolled type 2 diabetes mellitus with hyperglycemia 06/13/2016    Impression: 03/08/2016 - seeing Endo .;     Vertigo      Past Surgical History:   Procedure Laterality Date    CARDIAC VALVE REPLACEMENT  05/12/2021    Ross procedure 22 years ago    COLON SURGERY      COLONOSCOPY      COLOSTOMY  1999    COLOSTOMY REVISION      CYSTOSCOPY N/A 12/7/2021    Procedure: CYSTOSCOPY FLEXIBLE;  Surgeon: José Miguel Villafuerte Jr., MD;  Location:  [facility];  Service: Urology;  Laterality: N/A;    EXPLORATORY LAPAROTOMY      With Tissue Removal    LIPOMA EXCISION      MOHS SURGERY      NEPHROURETERECTOMY Right 12/7/2021    Procedure: RIGHT NEPHROURETERECTOMY LAPAROSCOPIC WITH DAVINCI ROBOT;  Surgeon: José Miguel Villafuerte Jr., MD;  Location:  [facility];  Service: Robotics - DaVinci;  Laterality: Right;    OTHER SURGICAL HISTORY      Porcine Valve    PROSTATE SURGERY      PROSTATECTOMY  2000     History of Prostatectomy Perineal Radical    SKIN CANCER EXCISION      from head and lip    TONSILLECTOMY        General Information       Row Name 11/30/23 0935          Physical Therapy Time and Intention    Document Type evaluation  -KW     Mode of Treatment physical therapy  -KW       Row Name 11/30/23 0935          General Information    Patient Profile Reviewed yes  -KW     Prior Level of Function independent:;all household mobility;gait;transfer;bed mobility  RW to ambulate  -KW     Existing Precautions/Restrictions fall  covid  -KW     Barriers to Rehab medically complex  -KW       Row Name 11/30/23 0935          Living Environment    People in Home facility resident  Herber Crump  -KW       Row Name 11/30/23 0935          Home Main Entrance    Number of Stairs, Main Entrance none  -KW       Row Name 11/30/23 0935          Cognition    Orientation Status (Cognition) oriented x 3  -KW       Row Name 11/30/23 0935          Safety Issues, Functional Mobility    Safety Issues Affecting  Function (Mobility) positioning of assistive device;safety precaution awareness  -KW     Impairments Affecting Function (Mobility) balance;endurance/activity tolerance;strength  -KW               User Key  (r) = Recorded By, (t) = Taken By, (c) = Cosigned By      Initials Name Provider Type    Aye Ramirez PT Physical Therapist                   Mobility       Row Name 11/30/23 0935          Bed Mobility    Bed Mobility supine-sit  -KW     Supine-Sit [place] (Bed Mobility) verbal cues;minimum assist (75% patient effort)  -KW     Assistive Device (Bed Mobility) bed rails;head of bed elevated  -KW       Row Name 11/30/23 0935          Sit-Stand Transfer    Sit-Stand [place] (Transfers) verbal cues;contact guard  -KW     Assistive Device (Sit-Stand Transfers) walker, front-wheeled  -KW       Row Name 11/30/23 0935          Gait/Stairs (Locomotion)    [place] Level (Gait) verbal cues;contact guard  -KW     Assistive Device (Gait) walker, front-wheeled  -KW     Distance in Feet (Gait) 30  -KW     Deviations/Abnormal Patterns (Gait) gait speed decreased;stride length decreased;binta decreased  -KW               User Key  (r) = Recorded By, (t) = Taken By, (c) = Cosigned By      Initials Name Provider Type    Aye Ramirez PT Physical Therapist                   Obj/Interventions       Row Name 11/30/23 0935          Strength Comprehensive (MMT)    Comment, General Manual Muscle Testing (MMT) Assessment BLE grossly 4+/5  -KW       Row Name 11/30/23 0935          Balance    Balance Assessment sitting static balance;sitting dynamic balance;sit to stand dynamic balance;standing static balance;standing dynamic balance  -KW     Static Sitting Balance independent  -KW     Dynamic Sitting Balance standby assist  -KW     Position, Sitting Balance sitting edge of bed;unsupported  -KW     Static Standing Balance contact guard  -KW     Dynamic Standing Balance contact guard  -KW      Position/Device Used, Standing Balance supported;walker, front-wheeled  -KW     Balance Interventions sitting;standing;sit to stand;supported  -KW               User Key  (r) = Recorded By, (t) = Taken By, (c) = Cosigned By      Initials Name Provider Type    Aye Ramirez PT Physical Therapist                   Goals/Plan       Row Name 11/30/23 0935          Bed Mobility Goal 1 (PT)    Activity/Assistive Device (Bed Mobility Goal 1, PT) sit to supine;supine to sit  -KW     [place] Level/Cues Needed (Bed Mobility Goal 1, PT) independent  -KW     Time Frame (Bed Mobility Goal 1, PT) 10 days  -KW       Row Name 11/30/23 0935          Transfer Goal 1 (PT)    Activity/Assistive Device (Transfer Goal 1, PT) sit-to-stand/stand-to-sit;bed-to-chair/chair-to-bed  -KW     [place] Level/Cues Needed (Transfer Goal 1, PT) modified independence  -KW     Time Frame (Transfer Goal 1, PT) 10 days  -KW       Row Name 11/30/23 0935          Gait Training Goal 1 (PT)    Activity/Assistive Device (Gait Training Goal 1, PT) assistive device use;decrease fall risk;gait (walking locomotion);diminish gait deviation;improve balance and speed;increase endurance/gait distance;walker, rolling  -KW     [place] Level (Gait Training Goal 1, PT) modified independence  -KW     Distance (Gait Training Goal 1, PT) 200  -KW     Time Frame (Gait Training Goal 1, PT) 10 days  -KW               User Key  (r) = Recorded By, (t) = Taken By, (c) = Cosigned By      Initials Name Provider Type    Aye Ramirez PT Physical Therapist                   Clinical Impression       Row Name 11/30/23 0935          Pain    Pretreatment Pain Rating 0/10 - no pain  -KW       Row Name 11/30/23 0935          Plan of Care Review    Plan of Care Reviewed With patient  -KW     Progress no change  -KW     Outcome Evaluation PT eval completed. Patient presents below baseline for functional mobility. Patient demonstrates decreased activity  tolerance, deconditioning and reduced dynamic balance. Patient would continue to benefit from skilled PT services to improve dynamic balance with gait, transfers strength, and activity tolerance training in order to build endurance and reduce risk of falls.  -       Row Name 11/30/23 0935          Therapy Assessment/Plan (PT)    Rehab Potential (PT) good, to achieve stated therapy goals  -     Criteria for Skilled Interventions Met (PT) yes;skilled treatment is necessary  -KW     Therapy Frequency (PT) daily  -KW       Row Name 11/30/23 0935          Positioning and Restraints    Pre-Treatment Position in bed  -KW     Post Treatment Position chair  -KW     In Chair reclined;call light within reach;encouraged to call for assist;exit alarm on  -KW               User Key  (r) = Recorded By, (t) = Taken By, (c) = Cosigned By      Initials Name Provider Type    Aye Ramirez, PT Physical Therapist                   Outcome Measures       Row Name 11/30/23 0935          How much help from another person do you currently need...    Turning from your back to your side while in flat bed without using bedrails? 4  -KW     Moving from lying on back to sitting on the side of a flat bed without bedrails? 3  -KW     Moving to and from a bed to a chair (including a wheelchair)? 3  -KW     Standing up from a chair using your arms (e.g., wheelchair, bedside chair)? 3  -KW     Climbing 3-5 steps with a railing? 2  -KW     To walk in hospital room? 3  -KW     AM-PAC 6 Clicks Score (PT) 18  -KW     Highest Level of Mobility Goal 6 --> Walk 10 steps or more  -KW       Row Name 11/30/23 1031 11/30/23 0935       Functional Assessment    Outcome Measure Options AM-PAC 6 Clicks Daily Activity (OT)  -AC AM-PAC 6 Clicks Basic Mobility (PT)  -KW              User Key  (r) = Recorded By, (t) = Taken By, (c) = Cosigned By      Initials Name Provider Type    Dary Ayala, OT Occupational Therapist    Aye Ramirez, AVINASH  Physical Therapist                                 Physical Therapy Education       Title: PT OT SLP Therapies (In Progress)       Topic: Physical Therapy (Not Started)       Point: Mobility training (Not Started)       Learner Progress:  Not documented in this visit.              Point: Home exercise program (Not Started)       Learner Progress:  Not documented in this visit.              Point: Body mechanics (Not Started)       Learner Progress:  Not documented in this visit.              Point: Precautions (Not Started)       Learner Progress:  Not documented in this visit.                                  PT Recommendation and Plan     Plan of Care Reviewed With: patient  Progress: no change  Outcome Evaluation: PT eval completed. Patient presents below baseline for functional mobility. Patient demonstrates decreased activity tolerance, deconditioning and reduced dynamic balance. Patient would continue to benefit from skilled PT services to improve dynamic balance with gait, transfers strength, and activity tolerance training in order to build endurance and reduce risk of falls.     Time Calculation:   PT Evaluation Complexity  History, PT Evaluation Complexity: 3 or more personal factors and/or comorbidities  Examination of Body Systems (PT Eval Complexity): total of 3 or more elements  Clinical Presentation (PT Evaluation Complexity): evolving  Clinical Decision Making (PT Evaluation Complexity): moderate complexity  Overall Complexity (PT Evaluation Complexity): moderate complexity     PT Charges       Row Name 11/30/23 0935             Time Calculation    Start Time 0935  -KW      PT Received On 11/30/23  -KW      PT Goal Re-Cert Due Date 12/10/23  -KW         Untimed Charges    PT Eval/Re-eval Minutes 56  -KW         Total Minutes    Untimed Charges Total Minutes 56  -KW       Total Minutes 56  -KW                User Key  (r) = Recorded By, (t) = Taken By, (c) = Cosigned By      Initials Name Provider  Type    KW Aye Qureshi, PT Physical Therapist                  Therapy Charges for Today       Code Description Service Date Service Provider Modifiers Qty    04077403704 HC PT EVAL MOD COMPLEXITY 4 11/30/2023 Aye Qureshi, PT GP 1            PT G-Codes  Outcome Measure Options: AM-PAC 6 Clicks Daily Activity (OT)  AM-PAC 6 Clicks Score (PT): 18  AM-PAC 6 Clicks Score (OT): 19  PT Discharge Summary  Anticipated Discharge Disposition (PT): other (see comments) (return to facility)    Aye Qureshi PT  11/30/2023

## 2023-11-30 NOTE — THERAPY EVALUATION
Patient Name: Blane Dietz  : 1935    MRN: [id number]                              Today's Date: 2023       Admit Date: 2023    Visit Dx:     ICD-10-CM ICD-9-CM   1. COVID-19  U07.1 079.89   2. Generalized weakness  R53.1 780.79   3. On antineoplastic chemotherapy  Z79.899 V58.69   4. 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     Diffuse large B-cell lymphoma of solid organ excluding spleen    PICC (peripherally inserted central catheter) flush    History of pulmonary embolism    History of malignant neoplasm of prostate    Lymphoma of kidney    Stage 3b chronic kidney disease    Upper respiratory tract infection due to COVID-19 virus     Past Medical History:   Diagnosis Date    Aortic stenosis     status post ROSS procedure, remote, with 2015 echocardiography revealing normal aortic and pulmonary valve function, normal ejection fraction and mild to moderate MR    Arthritis     Bilateral impacted cerumen 2016    Bronchitis     CHF (congestive heart failure)     Chronic gout of right foot 2016    Impression: 2016 - stable.;     COVID-19 vaccine series completed 2021    [place] 2nd dose 3/12/21.    Depression     Diabetes mellitus     Diverticulitis     Exogenous obesity     Gout     Heart murmur     History of vitamin D deficiency     Hypercholesteremia 2016    Hyperlipidemia     Hypertension     Kidney lesion     Malignant neoplasm of prostate     Morbid obesity 2016    Pneumonia 2021    Primary osteoarthritis involving multiple joints 2016    Impression: 2016 - stable;     Pulmonary embolism     Sleep apnea 2021     C-Pap    Uncontrolled type 2 diabetes mellitus with hyperglycemia 06/13/2016    Impression: 03/08/2016 - seeing Endo .;     Vertigo      Past Surgical History:   Procedure Laterality Date    CARDIAC VALVE REPLACEMENT  05/12/2021    Ross procedure 22 years ago    COLON SURGERY      COLONOSCOPY      COLOSTOMY  1999    COLOSTOMY REVISION      CYSTOSCOPY N/A 12/7/2021    Procedure: CYSTOSCOPY FLEXIBLE;  Surgeon: José Miguel Villafuerte Jr., MD;  Location:  [facility];  Service: Urology;  Laterality: N/A;    EXPLORATORY LAPAROTOMY      With Tissue Removal    LIPOMA EXCISION      MOHS SURGERY      NEPHROURETERECTOMY Right 12/7/2021    Procedure: RIGHT NEPHROURETERECTOMY LAPAROSCOPIC WITH DAVINCI ROBOT;  Surgeon: José Miguel Villafuerte Jr., MD;  Location:  [facility];  Service: Robotics - DaVinci;  Laterality: Right;    OTHER SURGICAL HISTORY      Porcine Valve    PROSTATE SURGERY      PROSTATECTOMY  2000     History of Prostatectomy Perineal Radical    SKIN CANCER EXCISION      from head and lip    TONSILLECTOMY        General Information       Row Name 11/30/23 0855          OT Time and Intention    Document Type evaluation  -AC     Mode of Treatment occupational therapy  -AC       Row Name 11/30/23 0855          General Information    Patient Profile Reviewed yes  -AC     Prior Level of Function independent:;all household mobility;ADL's  RW to ambulate, reports he does not have assistance with bathing/dressing at [facility]  -     Existing Precautions/Restrictions fall  Covid  -AC       Row Name 11/30/23 0855          Living Environment    People in Home facility resident  [facility]  -       Row Name 11/30/23 0855          Home Main Entrance    Number of Stairs, Main Entrance none  -AC       Row Name 11/30/23 0855          Stairs Within Home, Primary    Number of Stairs, Within Home, Primary none  -AC       Row Name 11/30/23 0855          Cognition    Orientation Status (Cognition) oriented x 3  -AC       Row Name 11/30/23  0855          Safety Issues, Functional Mobility    Safety Issues Affecting Function (Mobility) insight into deficits/self-awareness;safety precaution awareness  -     Impairments Affecting Function (Mobility) balance;endurance/activity tolerance;strength  -               User Key  (r) = Recorded By, (t) = Taken By, (c) = Cosigned By      Initials Name Provider Type     Dary Calle, OT Occupational Therapist                     Mobility/ADL's       Row Name 11/30/23 0955          Bed Mobility    Bed Mobility supine-sit  -     Supine-Sit [place] (Bed Mobility) verbal cues;minimum assist (75% patient effort)  -     Assistive Device (Bed Mobility) bed rails;head of bed elevated  -       Row Name 11/30/23 0955          Transfers    Transfers sit-stand transfer  -       Row Name 11/30/23 0955          Sit-Stand Transfer    Sit-Stand [place] (Transfers) verbal cues;contact guard  -     Assistive Device (Sit-Stand Transfers) walker, front-wheeled  -     Comment, (Sit-Stand Transfer) Vcs for hand placement  -       Row Name 11/30/23 0955          Functional Mobility    Functional Mobility- Ind. Level verbal cues required;contact guard assist  -     Functional Mobility- Device walker, front-wheeled  -     Functional Mobility-Distance (Feet) --  in room  -     Functional Mobility- Safety Issues step length decreased  -     Functional Mobility- Comment pt afraid his legs were going to give out on him  -       Row Name 11/30/23 0955          Activities of Daily Living    BADL Assessment/Intervention lower body dressing;toileting  -       Row Name 11/30/23 0955          Lower Body Dressing Assessment/Training    [place] Level (Lower Body Dressing) don;socks;minimum assist (75% patient effort)  -     Position (Lower Body Dressing) edge of bed sitting  -       Row Name 11/30/23 0955          Toileting Assessment/Training    [place] Level (Toileting) minimum assist (75%  patient effort);adjust/manage clothing  -AC     Assistive Devices (Toileting) urinal  -AC     Position (Toileting) supported standing  -AC               User Key  (r) = Recorded By, (t) = Taken By, (c) = Cosigned By      Initials Name Provider Type    Dary Ayala OT Occupational Therapist                   Obj/Interventions       Row Name 11/30/23 1025          Sensory Assessment (Somatosensory)    Sensory Assessment (Somatosensory) UE sensation intact  -       Row Name 11/30/23 1025          Vision Assessment/Intervention    Visual Impairment/Limitations corrective lenses for reading  -       Row Name 11/30/23 1025          Range of Motion Comprehensive    General Range of Motion bilateral upper extremity ROM WNL  -       Row Name 11/30/23 1025          Strength Comprehensive (MMT)    Comment, General Manual Muscle Testing (MMT) Assessment BUE grossly 4/5  -       Row Name 11/30/23 1025          Balance    Balance Assessment sitting static balance;sitting dynamic balance;standing static balance;standing dynamic balance  -AC     Static Sitting Balance independent  -AC     Dynamic Sitting Balance standby assist  -AC     Position, Sitting Balance unsupported  -AC     Static Standing Balance contact guard  -AC     Dynamic Standing Balance contact guard  -AC     Position/Device Used, Standing Balance supported;walker, front-wheeled  -AC               User Key  (r) = Recorded By, (t) = Taken By, (c) = Cosigned By      Initials Name Provider Type    Dary Ayala OT Occupational Therapist                   Goals/Plan       Row Name 11/30/23 1030          Bed Mobility Goal 1 (OT)    Activity/Assistive Device (Bed Mobility Goal 1, OT) sit to supine;supine to sit  -AC     [place] Level/Cues Needed (Bed Mobility Goal 1, OT) standby assist  -AC     Time Frame (Bed Mobility Goal 1, OT) by discharge  -     Progress/Outcomes (Bed Mobility Goal 1, OT) new goal;goal ongoing  -       Row Name 11/30/23  1030          Transfer Goal 1 (OT)    Activity/Assistive Device (Transfer Goal 1, OT) bed-to-chair/chair-to-bed;toilet;walker, rolling  -AC     [place] Level/Cues Needed (Transfer Goal 1, OT) standby assist  -AC     Time Frame (Transfer Goal 1, OT) by discharge  -AC     Progress/Outcome (Transfer Goal 1, OT) new goal;goal ongoing  -       Row Name 11/30/23 1030          Dressing Goal 1 (OT)    Activity/Device (Dressing Goal 1, OT) lower body dressing  -AC     [place]/Cues Needed (Dressing Goal 1, OT) standby assist  -AC     Time Frame (Dressing Goal 1, OT) by discharge  -AC     Progress/Outcome (Dressing Goal 1, OT) new goal;goal ongoing  -       Row Name 11/30/23 1030          Toileting Goal 1 (OT)    Activity/Device (Toileting Goal 1, OT) adjust/manage clothing;perform perineal hygiene  -AC     [place] Level/Cues Needed (Toileting Goal 1, OT) standby assist  -AC     Time Frame (Toileting Goal 1, OT) by discharge  -AC     Progress/Outcome (Toileting Goal 1, OT) new goal;goal ongoing  -       Row Name 11/30/23 1030          Therapy Assessment/Plan (OT)    Planned Therapy Interventions (OT) activity tolerance training;BADL retraining;functional balance retraining;occupation/activity based interventions;patient/caregiver education/training;strengthening exercise;transfer/mobility retraining  -               User Key  (r) = Recorded By, (t) = Taken By, (c) = Cosigned By      Initials Name Provider Type    AC Dary Calle OT Occupational Therapist                   Clinical Impression       Row Name 11/30/23 1025          Pain Assessment    Additional Documentation Pain Scale: FACES Pre/Post-Treatment (Group)  -       Row Name 11/30/23 1025          Pain Scale: FACES Pre/Post-Treatment    Pain: FACES Scale, Pretreatment 0-->no hurt  -AC     Posttreatment Pain Rating 2-->hurts little bit  -     Pain Location - back  -       Row Name 11/30/23 1025          Plan of Care Review    Plan of  Care Reviewed With patient  -AC     Outcome Evaluation Pt presents below baseline with self care/mobility d/t weakness, decr balance and activity tolerance.  Pt min A LBD, CGA to ambulate in room with RW.  OT will follow to advance pt toward PLOF. Recommend return to [facility] with OT services.  -       Row Name 11/30/23 1025          Therapy Assessment/Plan (OT)    Rehab Potential (OT) good, to achieve stated therapy goals  -     Criteria for Skilled Therapeutic Interventions Met (OT) yes;skilled treatment is necessary  -     Therapy Frequency (OT) daily  -       Row Name 11/30/23 1025          Therapy Plan Review/Discharge Plan (OT)    Anticipated Discharge Disposition (OT) other (see comments)  return to [facility] with OT services  -       Row Name 11/30/23 1025          Vital Signs    Pre Systolic BP Rehab 98  -AC     Pre Treatment Diastolic BP 61  -AC     Pretreatment Heart Rate (beats/min) 92  -AC     Posttreatment Heart Rate (beats/min) 96  -AC     Pre SpO2 (%) 94  -AC     O2 Delivery Pre Treatment other (see comments)  cpap  -AC     Post SpO2 (%) 95  -AC     O2 Delivery Post Treatment room air  -AC     Pre Patient Position Supine  -AC     Post Patient Position Sitting  -AC       Row Name 11/30/23 1025          Positioning and Restraints    Pre-Treatment Position in bed  -AC     Post Treatment Position chair  -AC     In Chair notified nsg;sitting;call light within reach;encouraged to call for assist;exit alarm on;with other staff;waffle cushion  -               User Key  (r) = Recorded By, (t) = Taken By, (c) = Cosigned By      Initials Name Provider Type    Dary Ayala, OT Occupational Therapist                   Outcome Measures       Row Name 11/30/23 1031          How much help from another is currently needed...    Putting on and taking off regular lower body clothing? 3  -AC     Bathing (including washing, rinsing, and drying) 3  -AC     Toileting (which includes using toilet  bed pan or urinal) 3  -AC     Putting on and taking off regular upper body clothing 3  -AC     Taking care of personal grooming (such as brushing teeth) 3  -AC     Eating meals 4  -AC     AM-PAC 6 Clicks Score (OT) 19  -AC       Row Name 11/30/23 1031          Functional Assessment    Outcome Measure Options AM-PAC 6 Clicks Daily Activity (OT)  -               User Key  (r) = Recorded By, (t) = Taken By, (c) = Cosigned By      Initials Name Provider Type     Dary Calle, OT Occupational Therapist                    Occupational Therapy Education       Title: PT OT SLP Therapies (In Progress)       Topic: Occupational Therapy (In Progress)       Point: ADL training (In Progress)       Description:   Instruct learner(s) on proper safety adaptation and remediation techniques during self care or transfers.   Instruct in proper use of assistive devices.                  Learning Progress Summary             Patient Acceptance, E, NR by  at 11/30/2023 1032                         Point: Home exercise program (Not Started)       Description:   Instruct learner(s) on appropriate technique for monitoring, assisting and/or progressing therapeutic exercises/activities.                  Learner Progress:  Not documented in this visit.              Point: Precautions (Not Started)       Description:   Instruct learner(s) on prescribed precautions during self-care and functional transfers.                  Learner Progress:  Not documented in this visit.              Point: Body mechanics (Not Started)       Description:   Instruct learner(s) on proper positioning and spine alignment during self-care, functional mobility activities and/or exercises.                  Learner Progress:  Not documented in this visit.                              User Key       Initials Effective Dates Name Provider Type Discipline     02/03/23 -  Dary Calle, OT Occupational Therapist OT                  OT Recommendation and  Plan  Planned Therapy Interventions (OT): activity tolerance training, BADL retraining, functional balance retraining, occupation/activity based interventions, patient/caregiver education/training, strengthening exercise, transfer/mobility retraining  Therapy Frequency (OT): daily  Plan of Care Review  Plan of Care Reviewed With: patient  Outcome Evaluation: Pt presents below baseline with self care/mobility d/t weakness, decr balance and activity tolerance.  Pt min A LBD, CGA to ambulate in room with RW.  OT will follow to advance pt toward PLOF. Recommend return to [facility] with OT services.     Time Calculation:   Evaluation Complexity (OT)  Review Occupational Profile/Medical/Therapy History Complexity: brief/low complexity  Assessment, Occupational Performance/Identification of Deficit Complexity: 1-3 performance deficits  Clinical Decision Making Complexity (OT): problem focused assessment/low complexity  Overall Complexity of Evaluation (OT): low complexity     Time Calculation- OT       Row Name 11/30/23 0902             Time Calculation- OT    OT Start Time 0902  -AC      OT Received On 11/30/23  -AC      OT Goal Re-Cert Due Date 12/10/23  -AC         Untimed Charges    OT Eval/Re-eval Minutes 50  -AC         Total Minutes    Untimed Charges Total Minutes 50  -AC       Total Minutes 50  -AC                User Key  (r) = Recorded By, (t) = Taken By, (c) = Cosigned By      Initials Name Provider Type    AC Dary Calle, OT Occupational Therapist                  Therapy Charges for Today       Code Description Service Date Service Provider Modifiers Qty    [id number]  OT EVAL LOW COMPLEXITY 4 11/30/2023 Dary Calle OT GO 1                 Dary Calle OT  11/30/2023

## 2023-11-30 NOTE — PLAN OF CARE
Goal Outcome Evaluation:  Plan of Care Reviewed With: patient        Progress: no change  Outcome Evaluation: VSS, afebrile, RA and cpap w/ O2 sats > 94%, denies pain.      Problem: Adult Inpatient Plan of Care  Goal: Absence of Hospital-Acquired Illness or Injury  Intervention: Identify and Manage Fall Risk  Recent Flowsheet Documentation  Taken 11/29/2023 2000 by Jace Cedeño RN  Safety Promotion/Fall Prevention: activity supervised  Intervention: Prevent Skin Injury  Recent Flowsheet Documentation  Taken 11/29/2023 2000 by Jace Cedeño RN  Body Position: position changed independently  Intervention: Prevent and Manage VTE (Venous Thromboembolism) Risk  Recent Flowsheet Documentation  Taken 11/29/2023 2000 by Jace Cedeño RN  Activity Management: activity encouraged     Problem: Adult Inpatient Plan of Care  Goal: Absence of Hospital-Acquired Illness or Injury  Intervention: Prevent Skin Injury  Recent Flowsheet Documentation  Taken 11/29/2023 2000 by Jace Cedeño RN  Body Position: position changed independently     Problem: Adult Inpatient Plan of Care  Goal: Absence of Hospital-Acquired Illness or Injury  Intervention: Prevent and Manage VTE (Venous Thromboembolism) Risk  Recent Flowsheet Documentation  Taken 11/29/2023 2000 by Jace Cedeño RN  Activity Management: activity encouraged

## 2023-11-30 NOTE — PROGRESS NOTES
[facility] Medicine Services  PROGRESS NOTE    Patient Name: Blane Dietz  : 1935  MRN: [id number]    Date of Admission: 2023  Primary Care Physician: David Em MD    Subjective   Subjective     CC:  Generalized weakness, frequent falls    HPI:  Evaluated patient this morning. Regarding COVID symptoms, patient currently reporting sore throat and mild congestion. Denies cough and shortness of breath. Was getting ready to eat breakfast. Admits that he has felt weakness in his legs at home.      Objective   Objective     Vital Signs:   Temp:  [97.7 °F (36.5 °C)-98.2 °F (36.8 °C)] 97.7 °F (36.5 °C)  Heart Rate:  [86-97] 86  Resp:  [16-18] 17  BP: ()/(59-90) 112/64  Flow (L/min):  [22-37] 37     Physical Exam:  Constitutional: No acute distress, awake, alert  HENT: NCAT, mucous membranes moist  Respiratory: Clear to auscultation bilaterally, respiratory effort normal   Cardiovascular: RRR, no murmurs, rubs, or gallops  Gastrointestinal: Positive bowel sounds, soft, nontender, nondistended  Musculoskeletal: No bilateral ankle edema  Neurologic: Alert and oriented x 3, no focal deficits, speech clear  Skin: No rashes      Results Reviewed:  LAB RESULTS:      Lab 23   WBC 8.03 8.48 9.42 9.80   HEMOGLOBIN 13.6 11.8* 14.3 13.8   HEMATOCRIT 41.0 37.1* 44.7 42.0   PLATELETS 85* 67* 88* 84*   NEUTROS ABS 5.94 5.79 7.54* 7.31*  7.55*   IMMATURE GRANS (ABS)  --  0.63*  --  0.61*   LYMPHS ABS  --  0.93  --  0.45*   MONOS ABS  --  0.89  --  1.00*   EOS ABS 0.32 0.10 0.09 0.19  0.10   MCV 91.3 96.6 94.1 92.3   CRP  --   --  2.91*  --          Lab 23   SODIUM 132* 131* 130*   POTASSIUM 4.4 5.2 5.1   CHLORIDE 102 100 101   CO2 20.0* 19.0* 17.6*   ANION GAP 10.0 12.0 11.4   BUN 30* 28* 29*   CREATININE 1.52* 1.64* 1.54*   EGFR 44.3* 40.5* 43.7*   GLUCOSE 93 202* 156*   CALCIUM 8.0*  8.3* 8.1*   MAGNESIUM  --  2.5*  --    TSH  --  1.470  --          Lab 11/30/23  0319 11/29/23  1357 11/28/23 1929   TOTAL PROTEIN 6.1 7.0 6.9   ALBUMIN 3.3* 3.9 3.8   GLOBULIN 2.8 3.1 3.1   ALT (SGPT) 32 40 36   AST (SGOT) 49* 55* 50*   BILIRUBIN 0.9 1.3* 1.2   ALK PHOS 187* 219* 197*         Lab 11/29/23  1748 11/29/23  1357 11/28/23  1929   HSTROP T 27* 32* 25*             Lab 11/29/23  1357   FERRITIN 290.50         Brief Urine Lab Results  (Last result in the past 365 days)        Color   Clarity   Blood   Leuk Est   Nitrite   Protein   CREAT   Urine HCG        11/28/23 1902 Yellow   Clear   Small (1+)   Negative   Negative   Trace                   Microbiology Results Abnormal       None            XR Chest 1 View    Result Date: 11/29/2023  XR CHEST 1 VW Date of Exam: 11/29/2023 2:03 PM EST Indication: Weak/Dizzy/AMS triage protocol Comparison: 11/23/2022 Findings: Previously seen pulmonary vascular congestion and bibasilar atelectasis has resolved. There is stable moderate cardiomegaly. Pulmonary vessels appear within normal limits on the current exam. Lung fields are clear. There are no effusions.    Impression: Impression: No acute process. Stable cardiomegaly. Electronically Signed: Bella Chapman MD  11/29/2023 2:24 PM EST  Workstation ID: [id number]    CT Head Without Contrast    Result Date: 11/28/2023  FINAL REPORT TECHNIQUE: Routine axial images through the head were obtained without contrast. CLINICAL HISTORY: Positive LOC with fall FINDINGS: There is  mild generalized cerebral atrophy.  The ventricles are dilated, but proportionate to the degree of atrophy.  There is no evidence of hemorrhage, mass or other acute abnormality.  No acute osseous or sinus abnormality is seen. There is no skull fracture.     Impression: No acute intracranial abnormality. Authenticated and Electronically Signed by Willis Quiñones M.D. on 11/28/2023 07:42:04 PM     Results for orders placed during the hospital encounter of  10/25/22    Adult Transthoracic Echo Complete With Contrast if Necessary Per Protocol    Interpretation Summary    Left ventricular wall thickness is consistent with mild concentric hypertrophy.    The right ventricular cavity is dilated.    Left atrial volume is moderately increased.    There is a bioprosthetic aortic valve present.    Estimated right ventricular systolic pressure from tricuspid regurgitation is mildly elevated (35-45 mmHg). Calculated right ventricular systolic pressure from tricuspid regurgitation is 36 mmHg.    There is a prosthetic pulmonic valve present.    There is a large left pleural effusion.    Mild global LV hypokinesis    Ejection fraction estimated at 45 to 50%    Mild to moderate AI    Mild MR      Current medications:  Scheduled Meds:allopurinol, 100 mg, Oral, Daily  amiodarone, 200 mg, Oral, Daily  clindamycin, 300 mg, Oral, Q8H  empagliflozin, 25 mg, Oral, Daily  enoxaparin, 40 mg, Subcutaneous, Daily  guaiFENesin, 600 mg, Oral, Q12H  insulin detemir, 20 Units, Subcutaneous, Daily  insulin lispro, 2-9 Units, Subcutaneous, 4x Daily AC & at Bedtime  metoprolol succinate XL, 12.5 mg, Oral, Daily  pravastatin, 40 mg, Oral, Nightly  saccharomyces boulardii, 250 mg, Oral, BID  sacubitril-valsartan, 1 tablet, Oral, Q12H  senna-docusate sodium, 2 tablet, Oral, BID  sodium chloride, 10 mL, Intravenous, Q12H  Triple Antibiotic, 1 application , Apply externally, Once      Continuous Infusions:   PRN Meds:.  acetaminophen    aluminum-magnesium hydroxide-simethicone    benzocaine-menthol    benzonatate    senna-docusate sodium **AND** polyethylene glycol **AND** bisacodyl **AND** bisacodyl    calcium carbonate    dexAMETHasone    dextrose    dextrose    fluticasone    glucagon (human recombinant)    guaiFENesin-dextromethorphan    nitroglycerin    sodium chloride    sodium chloride    sodium chloride    Assessment & Plan   Assessment & Plan     Active Hospital Problems    Diagnosis  POA     **Upper respiratory tract infection due to COVID-19 virus [U07.1, J06.9]  Yes    Pneumonia due to COVID-19 virus [U07.1, J12.82]  Yes    Stage 3b chronic kidney disease [N18.32]  Yes    HLD (hyperlipidemia) [E78.5]  Yes    Sleep apnea [G47.30]  Yes    Type 2 diabetes mellitus with hyperglycemia, with long-term current use of insulin [E11.65, Z79.4]  Not Applicable    Essential hypertension [I10]  Yes      Resolved Hospital Problems   No resolved problems to display.        Brief Hospital Course to date:  Blane Dietz is a 88 y.o. 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 Ramon for continued weakness. Patient was seen in [facility] ED 11/28 for weakness and a fall at home. On 11/29, his daughter called Dr. Ramon's office after patient slid out of bed trying to stand today at his assisted-living facility and had to have EMS get him off the floor, and daughter thought it might be related to chemotherapy. Patient presented to [facility] ED and was found to be positive for COVID-19.     This patient's problems and plans were partially entered by my partner and updated as appropriate by me 11/30/23.    COVID-19 infection  -Dx on 11/29, symptom onset 11/28  -WBC normal, afebrile with mild symptoms   -Deferred remdesevir as patient is on room air, minimally symptomatic, has one kidney and known CKD. Deferred dexamethasone as pt on room air.  -Continue supportive care with scheduled Mucinex, prn tessalon perles, prn Robitussin    Weakness, fall at home  -CT head negative at [facility]  -PT and OT following, assisting with evaluation.  -CM consulted, pt from assisted living    Elevated troponin  -troponin 32 down to 27, likely related to CKD/supply-demand mismatch  -pt denies chest pain  -EKG with  normal sinus rhythm and right bundle branch block.     Elevated creatinine  CKD III  H/o right nephrectomy  -baseline ~1.45-1.5  -creatinine 1.64 on admission  -Received gentle IV fluids x 16 hours  -Continue renally dosed allopurinol, Lovenox for dvt ppx  -Monitor CMP daily.     Thrombocytopenia  -on chemotherapy, drop from 148 on 11/1 >> 88 on admission  -Lovenox for DVT ppx, CrCl acceptable    Elevated LFTs  Elevated alk phos  -Likely secondary to chemotherapy  -Continue to monitor daily CMP.     Prostate cancer with bone metastasis  Hx of PEs  -Follows with Dr. Ramon  -holding home chemo medication given we do not have on formulary.   -previously anticoagulated with Eliquis  -Lovenox for DVT ppx given h/o cancer     Chronic HFmrEF  Atrial fibrillation  H/o AVR  -last echo 10/2022 with EF 45-50%, mild concentric hypertrophy  -continue Entresto, Jardiance, metoprolol  -continue amiodarone for atrial fibrillation  -free T4 elevated 2.06 but TSH wnl, recommend f/u with endocrinology as pt has known h/o a-fib     Prolonged QTc  -QTc 526 on admission  -patient on amiodarone, has RBB at baseline  -avoid additional QT-prolonging meds, dexamethasone prn for nausea     Left shin wound  Skin abrasions LLE and left elbow  -continue Bactrim and clindamycin  -added probiotic  -WOC evaluated, recommended to continue dressing changes every 3 days using Xeroform/silicone foam dressing.     HTN  HLD  -Continue metoprolol, statin     IDDM2  -A1c 8.8%  -follows with endocrinology, on Trulicity 1.5 mg weekly, Lantus 50 units daily, and Jardiance 25 mg daily at home  -started basal bolus regimen with Levemir 20 units daily plus SSI  -continue Jardiance     HUSSAIN  -CPAP    Expected Discharge Location and Transportation: pending PT/OT evaluations  Expected Discharge   Expected Discharge Date: 12/2/2023; Expected Discharge Time:      DVT prophylaxis:  Medical DVT prophylaxis orders are present.     AM-PAC 6 Clicks Score (PT): 18 (11/30/23  0935)    CODE STATUS:   Code Status and Medical Interventions:   Ordered at: 11/29/23 1641     Medical Intervention Limits:    NO intubation (DNI)    NO artificial nutrition     Code Status (Patient has no pulse and is not breathing):    CPR (Attempt to Resuscitate)     Medical Interventions (Patient has pulse or is breathing):    Limited Support       Saima Blanco, DO  11/30/23

## 2023-11-30 NOTE — PLAN OF CARE
Goal Outcome Evaluation:  Plan of Care Reviewed With: patient           Outcome Evaluation: Pt presents below baseline with self care/mobility d/t weakness, decr balance and activity tolerance.  Pt min A LBD, CGA to ambulate in room with RW.  OT will follow to advance pt toward PLOF. Recommend return to [facility] with OT services.      Anticipated Discharge Disposition (OT): other (see comments) (return to [facility] with OT services)

## 2023-11-30 NOTE — PLAN OF CARE
Goal Outcome Evaluation:  Plan of Care Reviewed With: patient        Progress: no change  Outcome Evaluation: PT eval completed. Patient presents below baseline for functional mobility. Patient demonstrates decreased activity tolerance, deconditioning and reduced dynamic balance. Patient would continue to benefit from skilled PT services to improve dynamic balance with gait, transfers strength, and activity tolerance training in order to build endurance and reduce risk of falls.      Anticipated Discharge Disposition (PT): other (see comments) (return to facility)

## 2023-12-01 LAB
ALBUMIN SERPL-MCNC: 3.5 G/DL (ref 3.5–5.2)
ALBUMIN/GLOB SERPL: 1.6 G/DL
ALP SERPL-CCNC: 195 U/L (ref 39–117)
ALT SERPL W P-5'-P-CCNC: 28 U/L (ref 1–41)
ANION GAP SERPL CALCULATED.3IONS-SCNC: 12 MMOL/L (ref 5–15)
AST SERPL-CCNC: 45 U/L (ref 1–40)
BASOPHILS # BLD MANUAL: 0.24 10*3/MM3 (ref 0–0.2)
BASOPHILS NFR BLD MANUAL: 3 % (ref 0–1.5)
BILIRUB SERPL-MCNC: 0.8 MG/DL (ref 0–1.2)
BUN SERPL-MCNC: 41 MG/DL (ref 8–23)
BUN/CREAT SERPL: 24 (ref 7–25)
BURR CELLS BLD QL SMEAR: ABNORMAL
CA-I SERPL ISE-MCNC: 1.14 MMOL/L (ref 1.12–1.32)
CALCIUM SPEC-SCNC: 7.7 MG/DL (ref 8.6–10.5)
CHLORIDE SERPL-SCNC: 108 MMOL/L (ref 98–107)
CO2 SERPL-SCNC: 16 MMOL/L (ref 22–29)
CREAT SERPL-MCNC: 1.71 MG/DL (ref 0.76–1.27)
DEPRECATED RDW RBC AUTO: 54.8 FL (ref 37–54)
EGFRCR SERPLBLD CKD-EPI 2021: 38.5 ML/MIN/1.73
EOSINOPHIL # BLD MANUAL: 0.32 10*3/MM3 (ref 0–0.4)
EOSINOPHIL NFR BLD MANUAL: 4 % (ref 0.3–6.2)
ERYTHROCYTE [DISTWIDTH] IN BLOOD BY AUTOMATED COUNT: 15.7 % (ref 12.3–15.4)
GLOBULIN UR ELPH-MCNC: 2.2 GM/DL
GLUCOSE BLDC GLUCOMTR-MCNC: 186 MG/DL (ref 70–130)
GLUCOSE BLDC GLUCOMTR-MCNC: 192 MG/DL (ref 70–130)
GLUCOSE SERPL-MCNC: 97 MG/DL (ref 65–99)
HCT VFR BLD AUTO: 39.9 % (ref 37.5–51)
HGB BLD-MCNC: 12.9 G/DL (ref 13–17.7)
LYMPHOCYTES # BLD MANUAL: 1.94 10*3/MM3 (ref 0.7–3.1)
LYMPHOCYTES NFR BLD MANUAL: 11 % (ref 5–12)
MCH RBC QN AUTO: 30.8 PG (ref 26.6–33)
MCHC RBC AUTO-ENTMCNC: 32.3 G/DL (ref 31.5–35.7)
MCV RBC AUTO: 95.2 FL (ref 79–97)
METAMYELOCYTES NFR BLD MANUAL: 2 % (ref 0–0)
MONOCYTES # BLD: 0.89 10*3/MM3 (ref 0.1–0.9)
MYELOCYTES NFR BLD MANUAL: 5 % (ref 0–0)
NEUTROPHILS # BLD AUTO: 4.13 10*3/MM3 (ref 1.7–7)
NEUTROPHILS NFR BLD MANUAL: 45 % (ref 42.7–76)
NEUTS BAND NFR BLD MANUAL: 6 % (ref 0–5)
NRBC SPEC MANUAL: 0 /100 WBC (ref 0–0.2)
PLAT MORPH BLD: NORMAL
PLATELET # BLD AUTO: 90 10*3/MM3 (ref 140–450)
PMV BLD AUTO: 11.1 FL (ref 6–12)
POTASSIUM SERPL-SCNC: 4.6 MMOL/L (ref 3.5–5.2)
PROT SERPL-MCNC: 5.7 G/DL (ref 6–8.5)
RBC # BLD AUTO: 4.19 10*6/MM3 (ref 4.14–5.8)
SMUDGE CELLS BLD QL SMEAR: ABNORMAL
SMUDGE CELLS IN BLOOD BY LIGHT MICROSCOPY: 2 /100 WBC
SODIUM SERPL-SCNC: 136 MMOL/L (ref 136–145)
VARIANT LYMPHS NFR BLD MANUAL: 21 % (ref 19.6–45.3)
VARIANT LYMPHS NFR BLD MANUAL: 3 % (ref 0–5)
WBC NRBC COR # BLD AUTO: 8.1 10*3/MM3 (ref 3.4–10.8)

## 2023-12-01 PROCEDURE — 97116 GAIT TRAINING THERAPY: CPT

## 2023-12-01 PROCEDURE — 97530 THERAPEUTIC ACTIVITIES: CPT

## 2023-12-01 PROCEDURE — 82948 REAGENT STRIP/BLOOD GLUCOSE: CPT

## 2023-12-01 PROCEDURE — 63710000001 INSULIN LISPRO (HUMAN) PER 5 UNITS: Performed by: NURSE PRACTITIONER

## 2023-12-01 PROCEDURE — G0378 HOSPITAL OBSERVATION PER HR: HCPCS

## 2023-12-01 PROCEDURE — 63710000001 INSULIN DETEMIR PER 5 UNITS: Performed by: NURSE PRACTITIONER

## 2023-12-01 PROCEDURE — 97110 THERAPEUTIC EXERCISES: CPT

## 2023-12-01 PROCEDURE — 82330 ASSAY OF CALCIUM: CPT

## 2023-12-01 PROCEDURE — 80053 COMPREHEN METABOLIC PANEL: CPT | Performed by: STUDENT IN AN ORGANIZED HEALTH CARE EDUCATION/TRAINING PROGRAM

## 2023-12-01 PROCEDURE — 99232 SBSQ HOSP IP/OBS MODERATE 35: CPT | Performed by: NURSE PRACTITIONER

## 2023-12-01 PROCEDURE — 85025 COMPLETE CBC W/AUTO DIFF WBC: CPT | Performed by: NURSE PRACTITIONER

## 2023-12-01 PROCEDURE — 94799 UNLISTED PULMONARY SVC/PX: CPT

## 2023-12-01 PROCEDURE — 85007 BL SMEAR W/DIFF WBC COUNT: CPT | Performed by: NURSE PRACTITIONER

## 2023-12-01 PROCEDURE — 25010000002 ENOXAPARIN PER 10 MG: Performed by: NURSE PRACTITIONER

## 2023-12-01 RX ADMIN — Medication 250 MG: at 21:09

## 2023-12-01 RX ADMIN — CLINDAMYCIN HYDROCHLORIDE 300 MG: 150 CAPSULE ORAL at 21:09

## 2023-12-01 RX ADMIN — Medication 250 MG: at 09:09

## 2023-12-01 RX ADMIN — Medication 10 ML: at 21:10

## 2023-12-01 RX ADMIN — INSULIN LISPRO 2 UNITS: 100 INJECTION, SOLUTION INTRAVENOUS; SUBCUTANEOUS at 21:10

## 2023-12-01 RX ADMIN — ALLOPURINOL 100 MG: 100 TABLET ORAL at 09:09

## 2023-12-01 RX ADMIN — AMIODARONE HYDROCHLORIDE 200 MG: 200 TABLET ORAL at 09:09

## 2023-12-01 RX ADMIN — INSULIN DETEMIR 20 UNITS: 100 INJECTION, SOLUTION SUBCUTANEOUS at 09:09

## 2023-12-01 RX ADMIN — INSULIN LISPRO 2 UNITS: 100 INJECTION, SOLUTION INTRAVENOUS; SUBCUTANEOUS at 11:35

## 2023-12-01 RX ADMIN — CLINDAMYCIN HYDROCHLORIDE 300 MG: 150 CAPSULE ORAL at 13:16

## 2023-12-01 RX ADMIN — Medication 10 ML: at 09:10

## 2023-12-01 RX ADMIN — SACUBITRIL AND VALSARTAN 1 TABLET: 24; 26 TABLET, FILM COATED ORAL at 09:09

## 2023-12-01 RX ADMIN — CLINDAMYCIN HYDROCHLORIDE 300 MG: 150 CAPSULE ORAL at 05:26

## 2023-12-01 RX ADMIN — SENNOSIDES AND DOCUSATE SODIUM 2 TABLET: 8.6; 5 TABLET ORAL at 21:09

## 2023-12-01 RX ADMIN — SACUBITRIL AND VALSARTAN 1 TABLET: 24; 26 TABLET, FILM COATED ORAL at 21:09

## 2023-12-01 RX ADMIN — PRAVASTATIN SODIUM 40 MG: 40 TABLET ORAL at 21:09

## 2023-12-01 RX ADMIN — EMPAGLIFLOZIN 25 MG: 25 TABLET, FILM COATED ORAL at 09:09

## 2023-12-01 RX ADMIN — GUAIFENESIN 600 MG: 600 TABLET, EXTENDED RELEASE ORAL at 21:09

## 2023-12-01 RX ADMIN — GUAIFENESIN 600 MG: 600 TABLET, EXTENDED RELEASE ORAL at 09:09

## 2023-12-01 NOTE — THERAPY TREATMENT NOTE
Patient Name: Blane Dietz  : 1935    MRN: [id number]                              Today's Date: 2023       Admit Date: 2023    Visit Dx:     ICD-10-CM ICD-9-CM   1. COVID-19  U07.1 079.89   2. Generalized weakness  R53.1 780.79   3. On antineoplastic chemotherapy  Z79.899 V58.69   4. History of diabetes mellitus  Z86.39 V12.29   5. Pneumonia due to COVID-19 virus  U07.1 480.8    J12.82 079.89   6. Upper respiratory tract infection due to COVID-19 virus  U07.1 465.9    J06.9 079.89   7. Stage 3b chronic kidney disease  N18.32 585.3   8. Essential hypertension  I10 401.9   9. Type 2 diabetes mellitus with hyperglycemia, with long-term current use of insulin  E11.65 250.00    Z79.4 790.29     V58.67     Patient Active Problem List   Diagnosis    Essential hypertension    Type 2 diabetes mellitus with hyperglycemia, with long-term current use of insulin    Prostate cancer    Aortic stenosis    Sleep apnea    S/P AVR    HLD (hyperlipidemia)    Renal mass    s/p right nepheroureterectomy and adrenalectomy     Diffuse large B-cell lymphoma of solid organ excluding spleen    PICC (peripherally inserted central catheter) flush    History of pulmonary embolism    History of malignant neoplasm of prostate    Lymphoma of kidney    Stage 3b chronic kidney disease    Upper respiratory tract infection due to COVID-19 virus    Pneumonia due to COVID-19 virus     Past Medical History:   Diagnosis Date    Aortic stenosis     status post ROSS procedure, remote, with 2015 echocardiography revealing normal aortic and pulmonary valve function, normal ejection fraction and mild to moderate MR    Arthritis     Bilateral impacted cerumen 2016    Bronchitis     CHF (congestive heart failure)     Chronic gout of right foot 2016    Impression: 2016 - stable.;     COVID-19 vaccine series completed 2021    [place] 2nd dose 3/12/21.    Depression     Diabetes mellitus     Diverticulitis      Exogenous obesity     Gout     Heart murmur     History of vitamin D deficiency     Hypercholesteremia 08/11/2016    Hyperlipidemia     Hypertension     Kidney lesion     Malignant neoplasm of prostate     Morbid obesity 08/11/2016    Pneumonia 05/12/2021    Primary osteoarthritis involving multiple joints 06/13/2016    Impression: 03/08/2016 - stable;     Pulmonary embolism     Sleep apnea 05/12/2021    C-Pap    Uncontrolled type 2 diabetes mellitus with hyperglycemia 06/13/2016    Impression: 03/08/2016 - seeing Endo .;     Vertigo      Past Surgical History:   Procedure Laterality Date    CARDIAC VALVE REPLACEMENT  05/12/2021    Ross procedure 22 years ago    COLON SURGERY      COLONOSCOPY      COLOSTOMY  1999    COLOSTOMY REVISION      CYSTOSCOPY N/A 12/7/2021    Procedure: CYSTOSCOPY FLEXIBLE;  Surgeon: José Miguel Villafuerte Jr., MD;  Location:  [facility];  Service: Urology;  Laterality: N/A;    EXPLORATORY LAPAROTOMY      With Tissue Removal    LIPOMA EXCISION      MOHS SURGERY      NEPHROURETERECTOMY Right 12/7/2021    Procedure: RIGHT NEPHROURETERECTOMY LAPAROSCOPIC WITH DAVINCI ROBOT;  Surgeon: José Miguel Villafuerte Jr., MD;  Location:  [facility];  Service: Robotics - DaVinci;  Laterality: Right;    OTHER SURGICAL HISTORY      Porcine Valve    PROSTATE SURGERY      PROSTATECTOMY  2000     History of Prostatectomy Perineal Radical    SKIN CANCER EXCISION      from head and lip    TONSILLECTOMY        General Information       Row Name 12/01/23 0927          OT Time and Intention    Document Type therapy note (daily note)  -AC     Mode of Treatment occupational therapy  -AC       Row Name 12/01/23 0927          General Information    Existing Precautions/Restrictions fall  covid  -AC       Row Name 12/01/23 0927          Cognition    Orientation Status (Cognition) oriented x 3  -AC       Row Name 12/01/23 0927          Safety Issues, Functional Mobility    Impairments Affecting Function (Mobility)  balance;endurance/activity tolerance;strength  -               User Key  (r) = Recorded By, (t) = Taken By, (c) = Cosigned By      Initials Name Provider Type    Dary Ayala, OT Occupational Therapist                     Mobility/ADL's       Row Name 12/01/23 0952          Transfers    Transfers sit-stand transfer  -       Row Name 12/01/23 0952          Sit-Stand Transfer    Sit-Stand [place] (Transfers) verbal cues;standby assist  -     Assistive Device (Sit-Stand Transfers) walker, front-wheeled  -     Comment, (Sit-Stand Transfer) Vcs fro hand placement  -       Row Name 12/01/23 0952          Functional Mobility    Functional Mobility- Ind. Level verbal cues required;contact guard assist  -     Functional Mobility- Device walker, front-wheeled  -     Functional Mobility-Distance (Feet) --  in room  -     Functional Mobility- Safety Issues step length decreased  -     Functional Mobility- Comment pt less fearful with ambulation today  -       Row Name 12/01/23 0952          Activities of Daily Living    BADL Assessment/Intervention lower body dressing  -       Row Name 12/01/23 0952          Lower Body Dressing Assessment/Training    [place] Level (Lower Body Dressing) don;socks;standby assist  -     Position (Lower Body Dressing) edge of bed sitting  -               User Key  (r) = Recorded By, (t) = Taken By, (c) = Cosigned By      Initials Name Provider Type    Dary Ayala, OT Occupational Therapist                   Obj/Interventions       Row Name 12/01/23 0953          Shoulder (Therapeutic Exercise)    Shoulder (Therapeutic Exercise) AROM (active range of motion)  -     Shoulder AROM (Therapeutic Exercise) bilateral;flexion;extension;horizontal aBduction/aDduction;10 repetitions  -       Row Name 12/01/23 0953          Elbow/Forearm (Therapeutic Exercise)    Elbow/Forearm (Therapeutic Exercise) AROM (active range of motion)  -     Elbow/Forearm AROM  (Therapeutic Exercise) bilateral;flexion;extension;10 repetitions  -       Row Name 12/01/23 0953          Motor Skills    Therapeutic Exercise shoulder;elbow/forearm  -       Row Name 12/01/23 0953          Balance    Balance Assessment sitting static balance;sitting dynamic balance  -     Static Sitting Balance independent  -AC     Dynamic Sitting Balance standby assist  -AC     Position, Sitting Balance unsupported  -     Static Standing Balance standby assist  -AC     Dynamic Standing Balance contact guard  -     Position/Device Used, Standing Balance walker, rolling  -               User Key  (r) = Recorded By, (t) = Taken By, (c) = Cosigned By      Initials Name Provider Type    AC Dary Calle, OT Occupational Therapist                   Goals/Plan    No documentation.                  Clinical Impression       Row Name 12/01/23 0954          Pain Assessment    Pretreatment Pain Rating 0/10 - no pain  -     Posttreatment Pain Rating 0/10 - no pain  -       Row Name 12/01/23 0954          Plan of Care Review    Plan of Care Reviewed With patient  -     Progress improving  -     Outcome Evaluation Pt with increased act tolerance from previous tx, and progressed from min A to SBA LBD,  SBA STS, CGA to ambulate in room with RW.  Pt with good progress, but continues below baseline with self care/mobility d/t weakness, decr balance, and act toelrance. Recommend IP rehab upon d/c for best outcome.  -       Row Name 12/01/23 0954          Therapy Assessment/Plan (OT)    Rehab Potential (OT) good, to achieve stated therapy goals  -     Criteria for Skilled Therapeutic Interventions Met (OT) yes;skilled treatment is necessary  -     Therapy Frequency (OT) daily  -       Row Name 12/01/23 0954          Therapy Plan Review/Discharge Plan (OT)    Anticipated Discharge Disposition (OT) inpatient rehabilitation facility  -       Row Name 12/01/23 0954          Vital Signs    Pretreatment  Heart Rate (beats/min) 92  -AC     Posttreatment Heart Rate (beats/min) 93  -AC     Pre SpO2 (%) 92  -AC     O2 Delivery Pre Treatment room air  -AC     O2 Delivery Intra Treatment room air  -AC     Post SpO2 (%) 96  -AC     Pre Patient Position Sitting  -AC     Post Patient Position Sitting  -AC       Row Name 12/01/23 0954          Positioning and Restraints    Pre-Treatment Position in bed  sititng EOB  -AC     In Chair notified nsg;sitting;call light within reach;encouraged to call for assist  -AC               User Key  (r) = Recorded By, (t) = Taken By, (c) = Cosigned By      Initials Name Provider Type    Dary Ayala, OT Occupational Therapist                   Outcome Measures       Row Name 12/01/23 0927          How much help from another is currently needed...    Putting on and taking off regular lower body clothing? 3  -AC     Bathing (including washing, rinsing, and drying) 3  -AC     Toileting (which includes using toilet bed pan or urinal) 3  -AC     Putting on and taking off regular upper body clothing 3  -AC     Taking care of personal grooming (such as brushing teeth) 3  -AC     Eating meals 4  -AC     AM-PAC 6 Clicks Score (OT) 19  -AC       Row Name 12/01/23 0927          Functional Assessment    Outcome Measure Options AM-PAC 6 Clicks Daily Activity (OT)  -AC               User Key  (r) = Recorded By, (t) = Taken By, (c) = Cosigned By      Initials Name Provider Type    Dary Ayala, OT Occupational Therapist                    Occupational Therapy Education       Title: PT OT SLP Therapies (In Progress)       Topic: Occupational Therapy (In Progress)       Point: ADL training (Done)       Description:   Instruct learner(s) on proper safety adaptation and remediation techniques during self care or transfers.   Instruct in proper use of assistive devices.                  Learning Progress Summary             Patient Acceptance, E, VU by ALVARADO at 12/1/2023 0959    Acceptance, E, NR by ALVARADO  at 11/30/2023 1032                         Point: Home exercise program (Not Started)       Description:   Instruct learner(s) on appropriate technique for monitoring, assisting and/or progressing therapeutic exercises/activities.                  Learner Progress:  Not documented in this visit.              Point: Precautions (Not Started)       Description:   Instruct learner(s) on prescribed precautions during self-care and functional transfers.                  Learner Progress:  Not documented in this visit.              Point: Body mechanics (Not Started)       Description:   Instruct learner(s) on proper positioning and spine alignment during self-care, functional mobility activities and/or exercises.                  Learner Progress:  Not documented in this visit.                              User Key       Initials Effective Dates Name Provider Type Discipline     02/03/23 -  Dary Calle, OT Occupational Therapist OT                  OT Recommendation and Plan  Planned Therapy Interventions (OT): activity tolerance training, BADL retraining, functional balance retraining, occupation/activity based interventions, patient/caregiver education/training, strengthening exercise, transfer/mobility retraining  Therapy Frequency (OT): daily  Plan of Care Review  Plan of Care Reviewed With: patient  Progress: improving  Outcome Evaluation: Pt with increased act tolerance from previous tx, and progressed from min A to SBA LBD,  SBA STS, CGA to ambulate in room with RW.  Pt with good progress, but continues below baseline with self care/mobility d/t weakness, decr balance, and act toelrance. Recommend IP rehab upon d/c for best outcome.     Time Calculation:   Evaluation Complexity (OT)  Review Occupational Profile/Medical/Therapy History Complexity: brief/low complexity  Assessment, Occupational Performance/Identification of Deficit Complexity: 1-3 performance deficits  Clinical Decision Making Complexity (OT):  problem focused assessment/low complexity  Overall Complexity of Evaluation (OT): low complexity     Time Calculation- OT       Row Name 12/01/23 0927             Time Calculation- OT    OT Start Time 0927  -AC      OT Received On 12/01/23  -AC      OT Goal Re-Cert Due Date 12/10/23  -AC         Timed Charges    87807 - OT Therapeutic Exercise Minutes 8  -AC      47921 - OT Therapeutic Activity Minutes 15  -AC         Total Minutes    Timed Charges Total Minutes 23  -AC       Total Minutes 23  -AC                User Key  (r) = Recorded By, (t) = Taken By, (c) = Cosigned By      Initials Name Provider Type    AC Dary Calle OT Occupational Therapist                  Therapy Charges for Today       Code Description Service Date Service Provider Modifiers Qty    85305405895 HC OT EVAL LOW COMPLEXITY 4 11/30/2023 Dary Calle OT GO 1    56752572911 HC OT THER PROC EA 15 MIN 12/1/2023 Dary Calle OT GO 1    74732059990 HC OT THERAPEUTIC ACT EA 15 MIN 12/1/2023 Dary Calle OT GO 1                 Dary Calle OT  12/1/2023

## 2023-12-01 NOTE — CASE MANAGEMENT/SOCIAL WORK
Continued Stay Note  [facility]     Patient Name: Blane Dietz  MRN: [id number]  Today's Date: 12/1/2023    Admit Date: 11/29/2023    Plan: rehab   Discharge Plan       Row Name 12/01/23 1341       Plan    Plan rehab    Patient/Family in Agreement with Plan yes    Plan Comments PT and OT saw the patient today and are recommending rehab. CM spoke with the patient's daughter and she is asking that CM make referrrals to all three [facility], [facility] and [facility]. The daughter spoke with Sarahy from [facility] who stated that they have a covid bed available, but did not offer the bed. CM faxed referrals to all 3 [facility] and [facility]. I was unable to reach Angela with [facility], but left her a VM to call back for a referral. CM will follow.    Final Discharge Disposition Code 03 - skilled nursing facility (SNF)                   Discharge Codes    No documentation.                 Expected Discharge Date and Time       Expected Discharge Date Expected Discharge Time    Dec 2, 2023               Aliyah Angeles RN

## 2023-12-01 NOTE — CASE MANAGEMENT/SOCIAL WORK
Continued Stay Note  [facility]     Patient Name: Blane Dietz  MRN: [id number]  Today's Date: 12/1/2023    Admit Date: 11/29/2023    Plan: undecided   Discharge Plan       Row Name 12/01/23 0825       Plan    Plan undecided    Patient/Family in Agreement with Plan yes    Plan Comments I spoke with the daughter of this patient on the phone regarding PT and OT recommendations that the patient return to his AL facility at discharge. [facility] Crump is declining a face to face visit and has stated that this patient is not appropriate to return until he goes to rehab first. CM has emailed PT and OT managers to see if PT and OT can re evaluate him. The bedside RN has also indicated that he needs assist x2 to ambulate. If PT and OT find he would benefit from rehab, then the daughter would like referrals made to any of the [facility]. CM will continue to follow.    Final Discharge Disposition Code 30 - still a patient                   Discharge Codes    No documentation.                 Expected Discharge Date and Time       Expected Discharge Date Expected Discharge Time    Dec 2, 2023               Aliyah Angeles RN

## 2023-12-01 NOTE — DISCHARGE PLACEMENT REQUEST
"Rc Blandon \"CHEO\" (88 y.o. Male)   To Jess at [facility]  From [facility]       Date of Birth   1935    Social Security Number       Address   [address] [facility] [address]    Home Phone   [phone]    MRN   [id number]       Worship   [employer]    Marital Status   Single                            Admission Date   11/29/23    Admission Type   Emergency    Admitting Provider   Saima Blanco DO    Attending Provider   Saima Blanco DO    Department, Room/Bed   [facility] 5H, S573/1       Discharge Date       Discharge Disposition       Discharge Destination                                 Attending Provider: Saima Blanco DO    Allergies: Ampicillin, Medrol [Methylprednisolone], Myrbetriq [Mirabegron], Penicillins, Bee Venom, Melatonin    Isolation: Contact Air   Infection: COVID (confirmed) (11/29/23)   Code Status: CPR    Ht: 177.8 cm (70\")   Wt: 86.2 kg (190 lb)    Admission Cmt: None   Principal Problem: Upper respiratory tract infection due to COVID-19 virus [U07.1,J06.9]                   Active Insurance as of 11/29/2023       Primary Coverage       Payor Plan Insurance Group Employer/Plan Group    HUMANA MEDICARE REPLACEMENT HUMANA MEDICARE REPLACEMENT [id number]       Payor Plan Address Payor Plan Phone Number Payor Plan Fax Number Effective Dates    [address] [address] [phone]  1/1/2018 - None Entered    [facility] [address]         Subscriber Name Subscriber Birth Date Member ID       RC BLANDON 1935 [id number]                     Emergency Contacts        (Rel.) Home Phone Work Phone Mobile Phone    Susana Blandon (Power of ) [phone] -- [phone]              Insurance Information                  HUMANA MEDICARE REPLACEMENT/HUMANA MEDICARE REPLACEMENT Phone: [phone]    Subscriber: Rc Blandon Subscriber#: [id number]    Group#: [id number] Precert#: " --             History & Physical        Delvin Wolf MD at 23 1520              [facility]  HISTORY AND PHYSICAL    Patient Name: Blane Dietz  : 1935  MRN: [id number]  Primary Care Physician: David Em MD  Date of admission: 2023    Subjective  Subjective     Chief Complaint:  Frequent falls    HPI:  Blane Dietz is a 88 y.o. 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 Ramon for continued weakness. Patient was seen in [facility] ED yesterday for weakness and a fall at home. Today, his daughter called Dr. Ramon's office after patient slid out of bed trying to stand today at his assisted-living facility and had to have EMS get him off the floor, and daughter thought it might be related to chemotherapy. Patient presented to [facility] ED and was found to be positive for Covid-19. Patient notes headache, mild cough with sputum, congestion, low temp x 2 days. He denies SOA, CP, N/V/D. Patient notes he hit the left side of his head when he fell yesterday, but CT from [facility] was negative for hemorrhage and acute intracranial abnormality. Patient fell on his left knee and elbow and has two skin abrasions in these areas. He is currently being treated with antibiotics for a left shin wound as well. He is receiving denosumab and leuprolide infusions for bone metastasis and prostate cancer/elevated PSA.    Review of Systems   Constitutional:  Positive for chills, fatigue and fever.   HENT:  Positive for congestion.    Respiratory:  Positive for cough. Negative for chest tightness and shortness of breath.    Cardiovascular:  Negative for chest pain and leg swelling.   Gastrointestinal:  Negative for abdominal pain, constipation,  diarrhea, nausea and vomiting.   Skin:  Positive for color change (erythema left knee) and wound (shearing abrasion lateral LLE and left elbow, skin tear anterior left shin).   Neurological:  Positive for weakness and headaches. Negative for dizziness, syncope and light-headedness.   Psychiatric/Behavioral:  Negative for sleep disturbance.         Personal History     Past Medical History:   Diagnosis Date    Aortic stenosis     status post ROSS procedure, remote, with December 2015 echocardiography revealing normal aortic and pulmonary valve function, normal ejection fraction and mild to moderate MR    Arthritis     Bilateral impacted cerumen 11/23/2016    Bronchitis     CHF (congestive heart failure)     Chronic gout of right foot 06/13/2016    Impression: 03/08/2016 - stable.;     COVID-19 vaccine series completed 05/12/2021    Maderna 2nd dose 3/12/21.    Depression     Diabetes mellitus     Diverticulitis     Exogenous obesity     Gout     Heart murmur     History of vitamin D deficiency     Hypercholesteremia 08/11/2016    Hyperlipidemia     Hypertension     Kidney lesion     Malignant neoplasm of prostate     Morbid obesity 08/11/2016    Pneumonia 05/12/2021    Primary osteoarthritis involving multiple joints 06/13/2016    Impression: 03/08/2016 - stable;     Pulmonary embolism     Sleep apnea 05/12/2021    C-Pap    Uncontrolled type 2 diabetes mellitus with hyperglycemia 06/13/2016    Impression: 03/08/2016 - seeing Endo .;     Vertigo          Oncology Problem List:  Diffuse large B-cell lymphoma of solid organ excluding spleen (12/28/ 2021; Status: Active)  Lymphoma of kidney (12/24/2021; Status: Active)  Prostate cancer (08/11/2016; Status: Active)    Oncology/Hematology History   Prostate cancer   8/11/2016 Initial Diagnosis    Prostate cancer (CMS/HCC)     9/19/2023 -  Chemotherapy    OP PROSTATE Enzalutamide     9/27/2023 -  Chemotherapy    OP SUPPORTIVE Denosumab (Xgeva) Q28D     9/27/2023 -   Chemotherapy    OP SUPPORTIVE Leuprolide 7.5 mg Q28D     Diffuse large B-cell lymphoma of solid organ excluding spleen   12/28/2021 Initial Diagnosis    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)     12/28/2021 Cancer Staged    Staging form: Hodgkin And Non-Hodgkin Lymphoma, AJCC 8th Edition  - Clinical stage from 12/28/2021: Stage IE (Diffuse large B-cell lymphoma) - Signed by Shannan Ramon MD on 12/28/2021 1/21/2022 -  Chemotherapy    OP CENTRAL VENOUS ACCESS DEVICE ACCESS, CARE, AND MAINTENANCE (CVAD)         Past Surgical History:   Procedure Laterality Date    CARDIAC VALVE REPLACEMENT  05/12/2021    Ross procedure 22 years ago    COLON SURGERY      COLONOSCOPY      COLOSTOMY  1999    COLOSTOMY REVISION      CYSTOSCOPY N/A 12/7/2021    Procedure: CYSTOSCOPY FLEXIBLE;  Surgeon: José Miguel Villafuerte Jr., MD;  Location:  [facility];  Service: Urology;  Laterality: N/A;    EXPLORATORY LAPAROTOMY      With Tissue Removal    LIPOMA EXCISION      MOHS SURGERY      NEPHROURETERECTOMY Right 12/7/2021    Procedure: RIGHT NEPHROURETERECTOMY LAPAROSCOPIC WITH DAVINCI ROBOT;  Surgeon: José Miguel Villafuerte Jr., MD;  Location:  [facility];  Service: Robotics - DaVinci;  Laterality: Right;    OTHER SURGICAL HISTORY      Porcine Valve    PROSTATE SURGERY      PROSTATECTOMY  2000     History of Prostatectomy Perineal Radical    SKIN CANCER EXCISION      from head and lip    TONSILLECTOMY         Family History:  family history includes Breast cancer in an other family member; Cancer in his mother and another family member; Diabetes in his mother and another family member; Heart disease in his father; Hypertension in an other family member; Lung cancer in his mother; Migraines in an other family member; Obesity in an other family member.     Social History:  reports that he has never smoked. He has never been exposed to tobacco smoke. He has never used smokeless tobacco. He reports that he does not  drink alcohol and does not use drugs.  Social History     Social History Narrative    Right hand dominant       Medications:  Calcium Carb-Cholecalciferol, Dulaglutide, allopurinol, amiodarone, bumetanide, clindamycin, empagliflozin, enzalutamide, fluticasone, insulin glargine, ketoconazole, metoprolol succinate XL, multivitamin with minerals, ondansetron, pravastatin, and sacubitril-valsartan    Allergies   Allergen Reactions    Ampicillin Anaphylaxis    Medrol [Methylprednisolone] Unknown - High Severity     Made his blood sugar get really high, can't take this    Myrbetriq [Mirabegron] Unknown - High Severity     High BP    Penicillins Anaphylaxis and Shortness Of Breath    Bee Venom Swelling    Melatonin Hallucinations       Objective  Objective     Vital Signs:   Temp:  [98.7 °F (37.1 °C)] 98.7 °F (37.1 °C)  Heart Rate:  [72-95] 92  Resp:  [16] 16  BP: (104-144)/(55-90) 117/69    Physical Exam   Constitutional: Awake, alert  Eyes: PERRLA, sclerae anicteric, no conjunctival injection  HENT: NCAT, mucous membranes moist  Neck: Supple, no thyromegaly, no lymphadenopathy, trachea midline  Respiratory: Clear to auscultation bilaterally, nonlabored respirations, room air   Cardiovascular: RRR, no murmurs, rubs, or gallops, palpable pedal pulses bilaterally  Gastrointestinal: Positive bowel sounds, soft, nontender, nondistended  Musculoskeletal: No bilateral ankle edema, no clubbing or cyanosis to extremities  Psychiatric: Appropriate affect, cooperative  Neurologic: Oriented x 3, strength symmetric in all extremities, Cranial Nerves grossly intact to confrontation, speech clear  Skin: No rashes, erythema and abrasion left knee, shearing abrasions lateral LLE and left elbows, skin tear anterior left shin      Result Review:  I have personally reviewed the results from the time of this admission to 11/29/2023 17:07 EST and agree with these findings:  [x]  Laboratory list / accordion  []  Microbiology  [x]   Radiology  [x]  EKG/Telemetry   [x]  Cardiology/Vascular   []  Pathology  [x]  Old records  []  Other:  Most notable findings include:     LAB RESULTS:      Lab 11/29/23  1357 11/28/23 1929   WBC 9.42 9.80   HEMOGLOBIN 14.3 13.8   HEMATOCRIT 44.7 42.0   PLATELETS 88* 84*   NEUTROS ABS 7.54* 7.31*  7.55*   IMMATURE GRANS (ABS)  --  0.61*   LYMPHS ABS  --  0.45*   MONOS ABS  --  1.00*   EOS ABS 0.09 0.19  0.10   MCV 94.1 92.3         Lab 11/29/23  1357 11/28/23 1929   SODIUM 131* 130*   POTASSIUM 5.2 5.1   CHLORIDE 100 101   CO2 19.0* 17.6*   ANION GAP 12.0 11.4   BUN 28* 29*   CREATININE 1.64* 1.54*   EGFR 40.5* 43.7*   GLUCOSE 202* 156*   CALCIUM 8.3* 8.1*   MAGNESIUM 2.5*  --    TSH 1.470  --          Lab 11/29/23  1357 11/28/23 1929   TOTAL PROTEIN 7.0 6.9   ALBUMIN 3.9 3.8   GLOBULIN 3.1 3.1   ALT (SGPT) 40 36   AST (SGOT) 55* 50*   BILIRUBIN 1.3* 1.2   ALK PHOS 219* 197*         Lab 11/29/23  1357   HSTROP T 32*                 Brief Urine Lab Results  (Last result in the past 365 days)        Color   Clarity   Blood   Leuk Est   Nitrite   Protein   CREAT   Urine HCG        11/28/23 1902 Yellow   Clear   Small (1+)   Negative   Negative   Trace                 Microbiology Results (last 10 days)       Procedure Component Value - Date/Time    COVID PRE-OP / PRE-PROCEDURE SCREENING ORDER (NO ISOLATION) - Swab, Nasopharynx [[id number]]  (Abnormal) Collected: 11/29/23 1400    Lab Status: Final result Specimen: Swab from Nasopharynx Updated: 11/29/23 1444    Narrative:      The following orders were created for panel order COVID PRE-OP / PRE-PROCEDURE SCREENING ORDER (NO ISOLATION) - Swab, Nasopharynx.  Procedure                               Abnormality         Status                     ---------                               -----------         ------                     COVID-19 and FLU A/B PCR...[[id number]]  Abnormal            Final result                 Please view results for these tests on the  individual orders.    COVID-19 and FLU A/B PCR, 1 HR TAT - Swab, Nasopharynx [[id number]]  (Abnormal) Collected: 11/29/23 1400    Lab Status: Final result Specimen: Swab from Nasopharynx Updated: 11/29/23 1444     COVID19 Detected     Influenza A PCR Not Detected     Influenza B PCR Not Detected    Narrative:      Fact sheet for providers: https://www.fda.gov/media/062382/download    Fact sheet for patients: https://www.fda.gov/media/357434/download    Test performed by PCR.  Influenza A and Influenza B negative results should be considered presumptive in samples that have a positive SARS-CoV-2 result.    Competitive inhibition studies showed that SARS-CoV-2 virus, when present at concentrations above 3.6E+04 copies/mL, can inhibit the detection and amplification of influenza A and influenza B virus RNA if present at or below 1.8E+02 copies/mL or 4.9E+02 copies/mL, respectively, and may lead to false negative influenza virus results. If co-infection with influenza A or influenza B virus is suspected in samples with a positive SARS-CoV-2 result, the sample should be re-tested with another FDA cleared, approved, or authorized influenza test, if influenza virus detection would change clinical management.            XR Chest 1 View    Result Date: 11/29/2023  XR CHEST 1 VW Date of Exam: 11/29/2023 2:03 PM EST Indication: Weak/Dizzy/AMS triage protocol Comparison: 11/23/2022 Findings: Previously seen pulmonary vascular congestion and bibasilar atelectasis has resolved. There is stable moderate cardiomegaly. Pulmonary vessels appear within normal limits on the current exam. Lung fields are clear. There are no effusions.    Impression: Impression: No acute process. Stable cardiomegaly. Electronically Signed: Bella Chapman MD  11/29/2023 2:24 PM EST  Workstation ID: [id number]    CT Head Without Contrast    Result Date: 11/28/2023  FINAL REPORT TECHNIQUE: Routine axial images through the head were obtained without contrast.  CLINICAL HISTORY: Positive LOC with fall FINDINGS: There is  mild generalized cerebral atrophy.  The ventricles are dilated, but proportionate to the degree of atrophy.  There is no evidence of hemorrhage, mass or other acute abnormality.  No acute osseous or sinus abnormality is seen. There is no skull fracture.     Impression: No acute intracranial abnormality. Authenticated and Electronically Signed by Willis Quiñones M.D. on 11/28/2023 07:42:04 PM     Results for orders placed during the hospital encounter of 10/25/22    Adult Transthoracic Echo Complete With Contrast if Necessary Per Protocol    Interpretation Summary    Left ventricular wall thickness is consistent with mild concentric hypertrophy.    The right ventricular cavity is dilated.    Left atrial volume is moderately increased.    There is a bioprosthetic aortic valve present.    Estimated right ventricular systolic pressure from tricuspid regurgitation is mildly elevated (35-45 mmHg). Calculated right ventricular systolic pressure from tricuspid regurgitation is 36 mmHg.    There is a prosthetic pulmonic valve present.    There is a large left pleural effusion.    Mild global LV hypokinesis    Ejection fraction estimated at 45 to 50%    Mild to moderate AI    Mild MR      Assessment & Plan  Assessment & Plan       Upper respiratory tract infection due to COVID-19 virus    Essential hypertension    Type 2 diabetes mellitus with hyperglycemia, with long-term current use of insulin    Sleep apnea    HLD (hyperlipidemia)    Stage 3b chronic kidney disease    Blane Dietz is a 88 y.o. 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 Ramon for continued weakness. Patient was seen in [facility] ED yesterday for weakness and a fall at home.  Today, his daughter called Dr. Ramon's office after patient slid out of bed trying to stand today at his assisted-living facility and had to have EMS get him off the floor, and daughter thought it might be related to chemotherapy. Patient presented to [facility] ED and was found to be positive for Covid-19.     Covid-19   Weakness, fall at home  -dx on 11/29, symptom onset 11/28  -WBC wnl but on abx, afebrile  -defer remdesevir as patient is on room air, minimally symptomatic, has one kidney and known CKD, elevated alk phos and bili  -defer dexamethasone as pt on room air  -O2 as needed for SOA to keep sat > 90%  -supportive care with scheduled Mucinex, prn tessalon perles, prn Robitussin  -CT head negative at [facility]  -PT and OT consult  -CM consult, pt from assisted living  -check baseline Covid labs-CRP, ferritin  -daily CBC w/ diff, CMP    Elevated troponin  -troponin 32, likely r/t CKD/supply-demand mismatch  -repeat troponin pending  -pt denies chest pain  -EKG strip not currently uploaded in Epic, reads as SSR with RBB    Elevated creatinine  CKD III  H/o right nephrectomy  -baseline ~1.45-1.5  -creatinine 1.64  -gentle IV fluids x 16 hours  -renally dosed allopurinol, heparin for dvt ppx  -AM CMP    Thrombocytopenia  -on chemotherapy, drop from 148 on 11/1 >> 88 today  -Lovenox for DVT ppx, CrCl acceptable    Prostate cancer with bone metastasis  H/o PEs  -hold home chemo meds  -follows with Dr. Ramon  -previously anticoagulated with Eliquis  -Lovenox for DVT ppx given h/o cancer    CHF  A-fib  H/o AVR  -last echo 10/2022 with EF 45-50%, mild concentric hypertrophy  -continue Entresto, Jardiance, metoprolol  -continue amiodarone  -free T4 elevated 2.06 but TSH wnl, recommend f/u with endocrinology as pt has known h/o a-fib    Prolonged QTc  -QTc 526  -patient on amiodarone, has RBB at baseline  -avoid QT-prolonging meds, dexamethasone prn for nausea    Left shin wound  Skin abrasions LLE and left  elbow  -continue Bactrim and clindamycin  -add probiotic  -WOC consult    HTN  HLD  -continue Entresto, metoprolol, statin    DM2  -A1c 8.8  -follow with endocrinology, on Trulicity 1.5 mg weekly, Lantus 50 units daily, and Jardiance 25 mg daily at home  -start basal bolus regimen with Levemir 20 units daily plus SSI  -continue Jardiance    HUSSAIN  -CPAP      DVT prophylaxis:  Medical    CODE STATUS:    Medical Intervention Limits: NO intubation (DNI); NO artificial nutrition  Code Status (Patient has no pulse and is not breathing): CPR (Attempt to Resuscitate)  Medical Interventions (Patient has pulse or is breathing): Limited Support      Expected Discharge  Expected Discharge Date: 12/2/2023; Expected Discharge Time:       Signature: Electronically signed by HIPOLITO Pierson, 11/29/23, 5:13 PM EST    Patient seen and examined at the bedside.  Patient is a 88-year-old  male with past medical history significant for prostate cancer with metastasis to the bones, diffuse large B cell lymphoma for which patient follows with Dr. Dove, hypertension, hyperlipidemia, type 2 diabetes mellitus, chronic congestive heart failure, atrial fibrillation on Eliquis and chronic kidney disease.  Patient was seen at the local emergency room yesterday after he had a fall and hit his head against the floor and had some left arm skin abrasions.  Patient was evaluated at the emergency room and then was sent home.  Evidently patient had another fall today or sled down to the floor from bed and as a result patient was brought to the emergency room here at [facility] at the recommendation of Dr. Dove.  Here initial evaluation at the emergency room revealed that patient has COVID 19 infection.  Respiratory wise patient is asymptomatic.  No cough and saturating in upper 90s on room air.    Physical exam:  Constitutional: No acute distress, awake, alert  HENT: NCAT, mucous membranes moist  Respiratory: Clear  to auscultation bilaterally, respiratory effort normal   Cardiovascular: RRR, no murmurs, rubs, or gallops  Gastrointestinal: Positive bowel sounds, soft, nontender, nondistended  Musculoskeletal: No bilateral ankle edema  Psychiatric: Appropriate affect, cooperative  Neurologic: Oriented x 3, strength symmetric in all extremities, Cranial Nerves grossly intact to confrontation, speech clear  Skin: No rashes     Assessment and plan:  88-year-old male with history of prostate cancer with metastasis to the bones, diffuse large B cell lymphoma, recent weakness and falls.  Patient was brought to the emergency room here admitted to [facility] and initial evaluation revealed that patient is positive for COVID-19.  However, patient does not have any respiratory symptoms.  He is saturating well on room air.  Will admit the patient has inpatient.  Please see the above for more details.    Total time spent was 75 minutes.              Electronically signed by Delvin Wolf MD at 23 2009          Physician Progress Notes (most recent note)        Saima Blanco DO at 23 1503              [facility]  PROGRESS NOTE    Patient Name: Blane Dietz  : 1935  MRN: [id number]    Date of Admission: 2023  Primary Care Physician: David Em MD    Subjective   Subjective     CC:  Generalized weakness, frequent falls    HPI:  Evaluated patient this morning. Regarding COVID symptoms, patient currently reporting sore throat and mild congestion. Denies cough and shortness of breath. Was getting ready to eat breakfast. Admits that he has felt weakness in his legs at home.      Objective   Objective     Vital Signs:   Temp:  [97.7 °F (36.5 °C)-98.2 °F (36.8 °C)] 97.7 °F (36.5 °C)  Heart Rate:  [86-97] 86  Resp:  [16-18] 17  BP: ()/(59-90) 112/64  Flow (L/min):  [22-37] 37     Physical Exam:  Constitutional: No acute distress, awake,  alert  HENT: NCAT, mucous membranes moist  Respiratory: Clear to auscultation bilaterally, respiratory effort normal   Cardiovascular: RRR, no murmurs, rubs, or gallops  Gastrointestinal: Positive bowel sounds, soft, nontender, nondistended  Musculoskeletal: No bilateral ankle edema  Neurologic: Alert and oriented x 3, no focal deficits, speech clear  Skin: No rashes      Results Reviewed:  LAB RESULTS:      Lab 11/30/23 0319 11/29/23 1748 11/29/23 1357 11/28/23 1929   WBC 8.03 8.48 9.42 9.80   HEMOGLOBIN 13.6 11.8* 14.3 13.8   HEMATOCRIT 41.0 37.1* 44.7 42.0   PLATELETS 85* 67* 88* 84*   NEUTROS ABS 5.94 5.79 7.54* 7.31*  7.55*   IMMATURE GRANS (ABS)  --  0.63*  --  0.61*   LYMPHS ABS  --  0.93  --  0.45*   MONOS ABS  --  0.89  --  1.00*   EOS ABS 0.32 0.10 0.09 0.19  0.10   MCV 91.3 96.6 94.1 92.3   CRP  --   --  2.91*  --          Lab 11/30/23 0319 11/29/23 1357 11/28/23 1929   SODIUM 132* 131* 130*   POTASSIUM 4.4 5.2 5.1   CHLORIDE 102 100 101   CO2 20.0* 19.0* 17.6*   ANION GAP 10.0 12.0 11.4   BUN 30* 28* 29*   CREATININE 1.52* 1.64* 1.54*   EGFR 44.3* 40.5* 43.7*   GLUCOSE 93 202* 156*   CALCIUM 8.0* 8.3* 8.1*   MAGNESIUM  --  2.5*  --    TSH  --  1.470  --          Lab 11/30/23 0319 11/29/23 1357 11/28/23 1929   TOTAL PROTEIN 6.1 7.0 6.9   ALBUMIN 3.3* 3.9 3.8   GLOBULIN 2.8 3.1 3.1   ALT (SGPT) 32 40 36   AST (SGOT) 49* 55* 50*   BILIRUBIN 0.9 1.3* 1.2   ALK PHOS 187* 219* 197*         Lab 11/29/23 1748 11/29/23  1357 11/28/23  1929   HSTROP T 27* 32* 25*             Lab 11/29/23  1357   FERRITIN 290.50         Brief Urine Lab Results  (Last result in the past 365 days)        Color   Clarity   Blood   Leuk Est   Nitrite   Protein   CREAT   Urine HCG        11/28/23 1902 Yellow   Clear   Small (1+)   Negative   Negative   Trace                   Microbiology Results Abnormal       None            XR Chest 1 View    Result Date: 11/29/2023  XR CHEST 1 VW Date of Exam: 11/29/2023 2:03 PM EST  Indication: Weak/Dizzy/AMS triage protocol Comparison: 11/23/2022 Findings: Previously seen pulmonary vascular congestion and bibasilar atelectasis has resolved. There is stable moderate cardiomegaly. Pulmonary vessels appear within normal limits on the current exam. Lung fields are clear. There are no effusions.    Impression: Impression: No acute process. Stable cardiomegaly. Electronically Signed: Bella Chapman MD  11/29/2023 2:24 PM EST  Workstation ID: [id number]    CT Head Without Contrast    Result Date: 11/28/2023  FINAL REPORT TECHNIQUE: Routine axial images through the head were obtained without contrast. CLINICAL HISTORY: Positive LOC with fall FINDINGS: There is  mild generalized cerebral atrophy.  The ventricles are dilated, but proportionate to the degree of atrophy.  There is no evidence of hemorrhage, mass or other acute abnormality.  No acute osseous or sinus abnormality is seen. There is no skull fracture.     Impression: No acute intracranial abnormality. Authenticated and Electronically Signed by Willis Quiñones M.D. on 11/28/2023 07:42:04 PM     Results for orders placed during the hospital encounter of 10/25/22    Adult Transthoracic Echo Complete With Contrast if Necessary Per Protocol    Interpretation Summary    Left ventricular wall thickness is consistent with mild concentric hypertrophy.    The right ventricular cavity is dilated.    Left atrial volume is moderately increased.    There is a bioprosthetic aortic valve present.    Estimated right ventricular systolic pressure from tricuspid regurgitation is mildly elevated (35-45 mmHg). Calculated right ventricular systolic pressure from tricuspid regurgitation is 36 mmHg.    There is a prosthetic pulmonic valve present.    There is a large left pleural effusion.    Mild global LV hypokinesis    Ejection fraction estimated at 45 to 50%    Mild to moderate AI    Mild MR      Current medications:  Scheduled Meds:allopurinol, 100 mg, Oral,  Daily  amiodarone, 200 mg, Oral, Daily  clindamycin, 300 mg, Oral, Q8H  empagliflozin, 25 mg, Oral, Daily  enoxaparin, 40 mg, Subcutaneous, Daily  guaiFENesin, 600 mg, Oral, Q12H  insulin detemir, 20 Units, Subcutaneous, Daily  insulin lispro, 2-9 Units, Subcutaneous, 4x Daily AC & at Bedtime  metoprolol succinate XL, 12.5 mg, Oral, Daily  pravastatin, 40 mg, Oral, Nightly  saccharomyces boulardii, 250 mg, Oral, BID  sacubitril-valsartan, 1 tablet, Oral, Q12H  senna-docusate sodium, 2 tablet, Oral, BID  sodium chloride, 10 mL, Intravenous, Q12H  Triple Antibiotic, 1 application , Apply externally, Once      Continuous Infusions:   PRN Meds:.  acetaminophen    aluminum-magnesium hydroxide-simethicone    benzocaine-menthol    benzonatate    senna-docusate sodium **AND** polyethylene glycol **AND** bisacodyl **AND** bisacodyl    calcium carbonate    dexAMETHasone    dextrose    dextrose    fluticasone    glucagon (human recombinant)    guaiFENesin-dextromethorphan    nitroglycerin    sodium chloride    sodium chloride    sodium chloride    Assessment & Plan   Assessment & Plan     Active Hospital Problems    Diagnosis  POA    **Upper respiratory tract infection due to COVID-19 virus [U07.1, J06.9]  Yes    Pneumonia due to COVID-19 virus [U07.1, J12.82]  Yes    Stage 3b chronic kidney disease [N18.32]  Yes    HLD (hyperlipidemia) [E78.5]  Yes    Sleep apnea [G47.30]  Yes    Type 2 diabetes mellitus with hyperglycemia, with long-term current use of insulin [E11.65, Z79.4]  Not Applicable    Essential hypertension [I10]  Yes      Resolved Hospital Problems   No resolved problems to display.        Brief Hospital Course to date:  Blane Dietz is a 88 y.o. 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 Ramon for continued weakness. Patient was seen in [facility] ED 11/28 for weakness and a fall at home. On 11/29, his daughter called Dr. Ramon's office after patient slid out of bed trying to stand today at his assisted-living facility and had to have EMS get him off the floor, and daughter thought it might be related to chemotherapy. Patient presented to [facility] ED and was found to be positive for COVID-19.     This patient's problems and plans were partially entered by my partner and updated as appropriate by me 11/30/23.    COVID-19 infection  -Dx on 11/29, symptom onset 11/28  -WBC normal, afebrile with mild symptoms   -Deferred remdesevir as patient is on room air, minimally symptomatic, has one kidney and known CKD. Deferred dexamethasone as pt on room air.  -Continue supportive care with scheduled Mucinex, prn tessalon perles, prn Robitussin    Weakness, fall at home  -CT head negative at [facility]  -PT and OT following, assisting with evaluation.  -CM consulted, pt from assisted living    Elevated troponin  -troponin 32 down to 27, likely related to CKD/supply-demand mismatch  -pt denies chest pain  -EKG with normal sinus rhythm and right bundle branch block.     Elevated creatinine  CKD III  H/o right nephrectomy  -baseline ~1.45-1.5  -creatinine 1.64 on admission  -Received gentle IV fluids x 16 hours  -Continue renally dosed allopurinol, Lovenox for dvt ppx  -Monitor CMP daily.     Thrombocytopenia  -on chemotherapy, drop from 148 on 11/1 >> 88 on admission  -Lovenox for DVT ppx, CrCl acceptable    Elevated LFTs  Elevated alk phos  -Likely secondary to chemotherapy  -Continue to monitor daily CMP.     Prostate cancer with bone metastasis  Hx of PEs  -Follows with Dr. Ramon  -holding home chemo medication given we do not have on formulary.   -previously anticoagulated with Eliquis  -Lovenox for DVT ppx given h/o cancer     Chronic HFmrEF  Atrial fibrillation  H/o AVR  -last echo 10/2022 with EF 45-50%,  mild concentric hypertrophy  -continue Entresto, Jardiance, metoprolol  -continue amiodarone for atrial fibrillation  -free T4 elevated 2.06 but TSH wnl, recommend f/u with endocrinology as pt has known h/o a-fib     Prolonged QTc  -QTc 526 on admission  -patient on amiodarone, has RBB at baseline  -avoid additional QT-prolonging meds, dexamethasone prn for nausea     Left shin wound  Skin abrasions LLE and left elbow  -continue Bactrim and clindamycin  -added probiotic  -WOC evaluated, recommended to continue dressing changes every 3 days using Xeroform/silicone foam dressing.     HTN  HLD  -Continue metoprolol, statin     IDDM2  -A1c 8.8%  -follows with endocrinology, on Trulicity 1.5 mg weekly, Lantus 50 units daily, and Jardiance 25 mg daily at home  -started basal bolus regimen with Levemir 20 units daily plus SSI  -continue Jardiance     HUSSAIN  -CPAP    Expected Discharge Location and Transportation: pending PT/OT evaluations  Expected Discharge   Expected Discharge Date: 12/2/2023; Expected Discharge Time:      DVT prophylaxis:  Medical DVT prophylaxis orders are present.     AM-PAC 6 Clicks Score (PT): 18 (11/30/23 0947)    CODE STATUS:   Code Status and Medical Interventions:   Ordered at: 11/29/23 1641     Medical Intervention Limits:    NO intubation (DNI)    NO artificial nutrition     Code Status (Patient has no pulse and is not breathing):    CPR (Attempt to Resuscitate)     Medical Interventions (Patient has pulse or is breathing):    Limited Support       Saima Blanco DO  11/30/23        Electronically signed by Saima Blanco DO at 11/30/23 1521          Physical Therapy Notes (most recent note)        Aye Qureshi, PT at 12/01/23 1130  Version 1 of 1         Goal Outcome Evaluation:  Plan of Care Reviewed With: patient        Progress: improving  Outcome Evaluation: Physical therapy treatment complete. The patient continues to require additional time to complete mobility.  Patient improved tolerance to mobility. Patient increased ambulation distance compared to previous treatment session. Patient completed exercises in chair. The patient continues to present below baseline for functional mobility. The patient would continue to benefit from skilled PT to address strength, balance and activity tolerance deficits. Continue to current PT POC      Anticipated Discharge Disposition (PT): inpatient rehabilitation facility    Electronically signed by Aye Qureshi PT at 23 1317          Occupational Therapy Notes (most recent note)        Dary Calle, OT at 23 3717          Patient Name: Blane Dietz  : 1935    MRN: [id number]                              Today's Date: 2023       Admit Date: 2023    Visit Dx:     ICD-10-CM ICD-9-CM   1. COVID-19  U07.1 079.89   2. Generalized weakness  R53.1 780.79   3. On antineoplastic chemotherapy  Z79.899 V58.69   4. History of diabetes mellitus  Z86.39 V12.29   5. Pneumonia due to COVID-19 virus  U07.1 480.8    J12.82 079.89   6. Upper respiratory tract infection due to COVID-19 virus  U07.1 465.9    J06.9 079.89   7. Stage 3b chronic kidney disease  N18.32 585.3   8. Essential hypertension  I10 401.9   9. Type 2 diabetes mellitus with hyperglycemia, with long-term current use of insulin  E11.65 250.00    Z79.4 790.29     V58.67     Patient Active Problem List   Diagnosis    Essential hypertension    Type 2 diabetes mellitus with hyperglycemia, with long-term current use of insulin    Prostate cancer    Aortic stenosis    Sleep apnea    S/P AVR    HLD (hyperlipidemia)    Renal mass    s/p right nepheroureterectomy and adrenalectomy     Diffuse large B-cell lymphoma of solid organ excluding spleen    PICC (peripherally inserted central catheter) flush    History of pulmonary embolism    History of malignant neoplasm of prostate    Lymphoma of kidney    Stage 3b chronic kidney disease    Upper respiratory tract  infection due to COVID-19 virus    Pneumonia due to COVID-19 virus     Past Medical History:   Diagnosis Date    Aortic stenosis     status post ROSS procedure, remote, with December 2015 echocardiography revealing normal aortic and pulmonary valve function, normal ejection fraction and mild to moderate MR    Arthritis     Bilateral impacted cerumen 11/23/2016    Bronchitis     CHF (congestive heart failure)     Chronic gout of right foot 06/13/2016    Impression: 03/08/2016 - stable.;     COVID-19 vaccine series completed 05/12/2021    [place] 2nd dose 3/12/21.    Depression     Diabetes mellitus     Diverticulitis     Exogenous obesity     Gout     Heart murmur     History of vitamin D deficiency     Hypercholesteremia 08/11/2016    Hyperlipidemia     Hypertension     Kidney lesion     Malignant neoplasm of prostate     Morbid obesity 08/11/2016    Pneumonia 05/12/2021    Primary osteoarthritis involving multiple joints 06/13/2016    Impression: 03/08/2016 - stable;     Pulmonary embolism     Sleep apnea 05/12/2021    C-Pap    Uncontrolled type 2 diabetes mellitus with hyperglycemia 06/13/2016    Impression: 03/08/2016 - seeing Endo .;     Vertigo      Past Surgical History:   Procedure Laterality Date    CARDIAC VALVE REPLACEMENT  05/12/2021    Ross procedure 22 years ago    COLON SURGERY      COLONOSCOPY      COLOSTOMY  1999    COLOSTOMY REVISION      CYSTOSCOPY N/A 12/7/2021    Procedure: CYSTOSCOPY FLEXIBLE;  Surgeon: José Miguel Villafuerte Jr., MD;  Location: [facility];  Service: Urology;  Laterality: N/A;    EXPLORATORY LAPAROTOMY      With Tissue Removal    LIPOMA EXCISION      MOHS SURGERY      NEPHROURETERECTOMY Right 12/7/2021    Procedure: RIGHT NEPHROURETERECTOMY LAPAROSCOPIC WITH DAVINCI ROBOT;  Surgeon: José Miguel Villafuerte Jr., MD;  Location:  [facility];  Service: Robotics - DaVinci;  Laterality: Right;    OTHER SURGICAL HISTORY      Porcine Valve    PROSTATE SURGERY      PROSTATECTOMY  2000      History of Prostatectomy Perineal Radical    SKIN CANCER EXCISION      from head and lip    TONSILLECTOMY        General Information       Row Name 12/01/23 0927          OT Time and Intention    Document Type therapy note (daily note)  -     Mode of Treatment occupational therapy  -       Row Name 12/01/23 0927          General Information    Existing Precautions/Restrictions fall  covid  -       Row Name 12/01/23 0927          Cognition    Orientation Status (Cognition) oriented x 3  -       Row Name 12/01/23 0927          Safety Issues, Functional Mobility    Impairments Affecting Function (Mobility) balance;endurance/activity tolerance;strength  -               User Key  (r) = Recorded By, (t) = Taken By, (c) = Cosigned By      Initials Name Provider Type     Dary Calle OT Occupational Therapist                     Mobility/ADL's       Row Name 12/01/23 0952          Transfers    Transfers sit-stand transfer  -       Row Name 12/01/23 0952          Sit-Stand Transfer    Sit-Stand [place] (Transfers) verbal cues;standby assist  -     Assistive Device (Sit-Stand Transfers) walker, front-wheeled  -     Comment, (Sit-Stand Transfer) Vcs fro hand placement  -       Row Name 12/01/23 0952          Functional Mobility    Functional Mobility- Ind. Level verbal cues required;contact guard assist  -     Functional Mobility- Device walker, front-wheeled  -     Functional Mobility-Distance (Feet) --  in room  -     Functional Mobility- Safety Issues step length decreased  -     Functional Mobility- Comment pt less fearful with ambulation today  -       Row Name 12/01/23 0952          Activities of Daily Living    BADL Assessment/Intervention lower body dressing  -       Row Name 12/01/23 0952          Lower Body Dressing Assessment/Training    [place] Level (Lower Body Dressing) don;socks;standby assist  -     Position (Lower Body Dressing) edge of bed sitting  -                User Key  (r) = Recorded By, (t) = Taken By, (c) = Cosigned By      Initials Name Provider Type    Dary Ayala, OT Occupational Therapist                   Obj/Interventions       Row Name 12/01/23 0953          Shoulder (Therapeutic Exercise)    Shoulder (Therapeutic Exercise) AROM (active range of motion)  -     Shoulder AROM (Therapeutic Exercise) bilateral;flexion;extension;horizontal aBduction/aDduction;10 repetitions  -       Row Name 12/01/23 0953          Elbow/Forearm (Therapeutic Exercise)    Elbow/Forearm (Therapeutic Exercise) AROM (active range of motion)  -     Elbow/Forearm AROM (Therapeutic Exercise) bilateral;flexion;extension;10 repetitions  -       Row Name 12/01/23 0953          Motor Skills    Therapeutic Exercise shoulder;elbow/forearm  -       Row Name 12/01/23 0953          Balance    Balance Assessment sitting static balance;sitting dynamic balance  -     Static Sitting Balance independent  -     Dynamic Sitting Balance standby assist  -AC     Position, Sitting Balance unsupported  -     Static Standing Balance standby assist  -AC     Dynamic Standing Balance contact guard  -     Position/Device Used, Standing Balance walker, rolling  -AC               User Key  (r) = Recorded By, (t) = Taken By, (c) = Cosigned By      Initials Name Provider Type    Dary Ayala, OT Occupational Therapist                   Goals/Plan    No documentation.                  Clinical Impression       Row Name 12/01/23 0954          Pain Assessment    Pretreatment Pain Rating 0/10 - no pain  -     Posttreatment Pain Rating 0/10 - no pain  -       Row Name 12/01/23 0954          Plan of Care Review    Plan of Care Reviewed With patient  -     Progress improving  -     Outcome Evaluation Pt with increased act tolerance from previous tx, and progressed from min A to SBA LBD,  SBA STS, CGA to ambulate in room with RW.  Pt with good progress, but continues below baseline with  self care/mobility d/t weakness, decr balance, and act toelrance. Recommend IP rehab upon d/c for best outcome.  -       Row Name 12/01/23 0954          Therapy Assessment/Plan (OT)    Rehab Potential (OT) good, to achieve stated therapy goals  -     Criteria for Skilled Therapeutic Interventions Met (OT) yes;skilled treatment is necessary  -AC     Therapy Frequency (OT) daily  -       Row Name 12/01/23 0954          Therapy Plan Review/Discharge Plan (OT)    Anticipated Discharge Disposition (OT) inpatient rehabilitation facility  -       Row Name 12/01/23 0954          Vital Signs    Pretreatment Heart Rate (beats/min) 92  -AC     Posttreatment Heart Rate (beats/min) 93  -AC     Pre SpO2 (%) 92  -AC     O2 Delivery Pre Treatment room air  -AC     O2 Delivery Intra Treatment room air  -AC     Post SpO2 (%) 96  -AC     Pre Patient Position Sitting  -AC     Post Patient Position Sitting  -       Row Name 12/01/23 0954          Positioning and Restraints    Pre-Treatment Position in bed  sititng EOB  -AC     In Chair notified nsg;sitting;call light within reach;encouraged to call for assist  -AC               User Key  (r) = Recorded By, (t) = Taken By, (c) = Cosigned By      Initials Name Provider Type    AC Dary Calle, OT Occupational Therapist                   Outcome Measures       Row Name 12/01/23 0927          How much help from another is currently needed...    Putting on and taking off regular lower body clothing? 3  -AC     Bathing (including washing, rinsing, and drying) 3  -AC     Toileting (which includes using toilet bed pan or urinal) 3  -AC     Putting on and taking off regular upper body clothing 3  -AC     Taking care of personal grooming (such as brushing teeth) 3  -AC     Eating meals 4  -AC     AM-PAC 6 Clicks Score (OT) 19  -AC       Row Name 12/01/23 0927          Functional Assessment    Outcome Measure Options AM-PAC 6 Clicks Daily Activity (OT)  -AC               User Key  (r)  = Recorded By, (t) = Taken By, (c) = Cosigned By      Initials Name Provider Type     Dary Calle, OT Occupational Therapist                    Occupational Therapy Education       Title: PT OT SLP Therapies (In Progress)       Topic: Occupational Therapy (In Progress)       Point: ADL training (Done)       Description:   Instruct learner(s) on proper safety adaptation and remediation techniques during self care or transfers.   Instruct in proper use of assistive devices.                  Learning Progress Summary             Patient Acceptance, E, VU by  at 12/1/2023 0959    Acceptance, E, NR by  at 11/30/2023 1032                         Point: Home exercise program (Not Started)       Description:   Instruct learner(s) on appropriate technique for monitoring, assisting and/or progressing therapeutic exercises/activities.                  Learner Progress:  Not documented in this visit.              Point: Precautions (Not Started)       Description:   Instruct learner(s) on prescribed precautions during self-care and functional transfers.                  Learner Progress:  Not documented in this visit.              Point: Body mechanics (Not Started)       Description:   Instruct learner(s) on proper positioning and spine alignment during self-care, functional mobility activities and/or exercises.                  Learner Progress:  Not documented in this visit.                              User Key       Initials Effective Dates Name Provider Type Discipline     02/03/23 -  Dary Calle OT Occupational Therapist OT                  OT Recommendation and Plan  Planned Therapy Interventions (OT): activity tolerance training, BADL retraining, functional balance retraining, occupation/activity based interventions, patient/caregiver education/training, strengthening exercise, transfer/mobility retraining  Therapy Frequency (OT): daily  Plan of Care Review  Plan of Care Reviewed With: patient  Progress:  improving  Outcome Evaluation: Pt with increased act tolerance from previous tx, and progressed from min A to SBA LBD,  SBA STS, CGA to ambulate in room with RW.  Pt with good progress, but continues below baseline with self care/mobility d/t weakness, decr balance, and act toelrance. Recommend IP rehab upon d/c for best outcome.     Time Calculation:   Evaluation Complexity (OT)  Review Occupational Profile/Medical/Therapy History Complexity: brief/low complexity  Assessment, Occupational Performance/Identification of Deficit Complexity: 1-3 performance deficits  Clinical Decision Making Complexity (OT): problem focused assessment/low complexity  Overall Complexity of Evaluation (OT): low complexity     Time Calculation- OT       Row Name 12/01/23 0927             Time Calculation- OT    OT Start Time 0927  -AC      OT Received On 12/01/23  -AC      OT Goal Re-Cert Due Date 12/10/23  -AC         Timed Charges    82813 - OT Therapeutic Exercise Minutes 8  -AC      10046 - OT Therapeutic Activity Minutes 15  -AC         Total Minutes    Timed Charges Total Minutes 23  -AC       Total Minutes 23  -AC                User Key  (r) = Recorded By, (t) = Taken By, (c) = Cosigned By      Initials Name Provider Type    AC Dary Calle OT Occupational Therapist                  Therapy Charges for Today       Code Description Service Date Service Provider Modifiers Qty    79366212952 HC OT EVAL LOW COMPLEXITY 4 11/30/2023 Dary Calle OT GO 1    04635955551 HC OT THER PROC EA 15 MIN 12/1/2023 Dary Calle OT GO 1    01421260252 HC OT THERAPEUTIC ACT EA 15 MIN 12/1/2023 Dary Calle OT GO 1                 Dary Calle OT  12/1/2023    Electronically signed by Dary Calle OT at 12/01/23 1001

## 2023-12-01 NOTE — PLAN OF CARE
Goal Outcome Evaluation:         A&Ox4. RA. Ambulating in room x1 assist. BM x1. Voiding well. OOB to chair. Plans to discharge to [facility]. Safety maintained. Daughter visited. No complaints of pain. VSS. NSR.

## 2023-12-01 NOTE — DISCHARGE PLACEMENT REQUEST
"Rc Blandon \"CHEO\" (88 y.o. Male)   To [facility] from [facility] and Nicolas Hwang  From [facility]       Date of Birth   1935    Social Security Number       Address   [address] [facility] [address]    Home Phone   [phone]    MRN   [id number]       Buddhist   Presbyterian    Marital Status   Single                            Admission Date   11/29/23    Admission Type   Emergency    Admitting Provider   Saima Blanco DO    Attending Provider   Saima Blanco DO    Department, Room/Bed   [address], S573/1       Discharge Date       Discharge Disposition       Discharge Destination                                 Attending Provider: Saima Blanco DO    Allergies: Ampicillin, Medrol [Methylprednisolone], Myrbetriq [Mirabegron], Penicillins, Bee Venom, Melatonin    Isolation: Contact Air   Infection: COVID (confirmed) (11/29/23)   Code Status: CPR    Ht: 177.8 cm (70\")   Wt: 86.2 kg (190 lb)    Admission Cmt: None   Principal Problem: Upper respiratory tract infection due to COVID-19 virus [U07.1,J06.9]                   Active Insurance as of 11/29/2023       Primary Coverage       Payor Plan Insurance Group Employer/Plan Group    HUMANA MEDICARE REPLACEMENT HUMANA MEDICARE REPLACEMENT [id number]       Payor Plan Address Payor Plan Phone Number Payor Plan Fax Number Effective Dates    [address] [address] [phone]  1/1/2018 - None Entered    formerly [facility] [address]         Subscriber Name Subscriber Birth Date Member ID       RC BLANDON 1935 [id number]                     Emergency Contacts        (Rel.) Home Phone Work Phone Mobile Phone    Susana Blandon (Power of ) [phone] -- [phone]              Insurance Information                  HUMANA MEDICARE REPLACEMENT/HUMANA MEDICARE REPLACEMENT Phone: [phone]    Subscriber: Rc Blandon Subscriber#: [id number]    " Group#: [id number] Precert#: --             History & Physical        Delvin Wolf MD at 23 1520              [facility] Medicine Services  HISTORY AND PHYSICAL    Patient Name: Blane Dietz  : 1935  MRN: [id number]  Primary Care Physician: David Em MD  Date of admission: 2023    Subjective  Subjective     Chief Complaint:  Frequent falls    HPI:  Blane Dietz is a 88 y.o. 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 Ramon for continued weakness. Patient was seen in [facility] ED yesterday for weakness and a fall at home. Today, his daughter called Dr. Ramon's office after patient slid out of bed trying to stand today at his assisted-living facility and had to have EMS get him off the floor, and daughter thought it might be related to chemotherapy. Patient presented to [facility] ED and was found to be positive for Covid-19. Patient notes headache, mild cough with sputum, congestion, low temp x 2 days. He denies SOA, CP, N/V/D. Patient notes he hit the left side of his head when he fell yesterday, but CT from [facility] was negative for hemorrhage and acute intracranial abnormality. Patient fell on his left knee and elbow and has two skin abrasions in these areas. He is currently being treated with antibiotics for a left shin wound as well. He is receiving denosumab and leuprolide infusions for bone metastasis and prostate cancer/elevated PSA.    Review of Systems   Constitutional:  Positive for chills, fatigue and fever.   HENT:  Positive for congestion.    Respiratory:  Positive for cough. Negative for chest tightness and shortness of breath.    Cardiovascular:  Negative for chest pain and leg swelling.   Gastrointestinal:  Negative for abdominal  pain, constipation, diarrhea, nausea and vomiting.   Skin:  Positive for color change (erythema left knee) and wound (shearing abrasion lateral LLE and left elbow, skin tear anterior left shin).   Neurological:  Positive for weakness and headaches. Negative for dizziness, syncope and light-headedness.   Psychiatric/Behavioral:  Negative for sleep disturbance.         Personal History     Past Medical History:   Diagnosis Date    Aortic stenosis     status post ROSS procedure, remote, with December 2015 echocardiography revealing normal aortic and pulmonary valve function, normal ejection fraction and mild to moderate MR    Arthritis     Bilateral impacted cerumen 11/23/2016    Bronchitis     CHF (congestive heart failure)     Chronic gout of right foot 06/13/2016    Impression: 03/08/2016 - stable.;     COVID-19 vaccine series completed 05/12/2021    Maderna 2nd dose 3/12/21.    Depression     Diabetes mellitus     Diverticulitis     Exogenous obesity     Gout     Heart murmur     History of vitamin D deficiency     Hypercholesteremia 08/11/2016    Hyperlipidemia     Hypertension     Kidney lesion     Malignant neoplasm of prostate     Morbid obesity 08/11/2016    Pneumonia 05/12/2021    Primary osteoarthritis involving multiple joints 06/13/2016    Impression: 03/08/2016 - stable;     Pulmonary embolism     Sleep apnea 05/12/2021    C-Pap    Uncontrolled type 2 diabetes mellitus with hyperglycemia 06/13/2016    Impression: 03/08/2016 - seeing Endo .;     Vertigo          Oncology Problem List:  Diffuse large B-cell lymphoma of solid organ excluding spleen (12/28/ 2021; Status: Active)  Lymphoma of kidney (12/24/2021; Status: Active)  Prostate cancer (08/11/2016; Status: Active)    Oncology/Hematology History   Prostate cancer   8/11/2016 Initial Diagnosis    Prostate cancer (CMS/HCC)     9/19/2023 -  Chemotherapy    OP PROSTATE Enzalutamide     9/27/2023 -  Chemotherapy    OP SUPPORTIVE Denosumab (Xgeva) Q28D      9/27/2023 -  Chemotherapy    OP SUPPORTIVE Leuprolide 7.5 mg Q28D     Diffuse large B-cell lymphoma of solid organ excluding spleen   12/28/2021 Initial Diagnosis    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)     12/28/2021 Cancer Staged    Staging form: Hodgkin And Non-Hodgkin Lymphoma, AJCC 8th Edition  - Clinical stage from 12/28/2021: Stage IE (Diffuse large B-cell lymphoma) - Signed by Shannan Ramon MD on 12/28/2021 1/21/2022 -  Chemotherapy    OP CENTRAL VENOUS ACCESS DEVICE ACCESS, CARE, AND MAINTENANCE (CVAD)         Past Surgical History:   Procedure Laterality Date    CARDIAC VALVE REPLACEMENT  05/12/2021    Ross procedure 22 years ago    COLON SURGERY      COLONOSCOPY      COLOSTOMY  1999    COLOSTOMY REVISION      CYSTOSCOPY N/A 12/7/2021    Procedure: CYSTOSCOPY FLEXIBLE;  Surgeon: José Miguel Villafuerte Jr., MD;  Location:  [facility];  Service: Urology;  Laterality: N/A;    EXPLORATORY LAPAROTOMY      With Tissue Removal    LIPOMA EXCISION      MOHS SURGERY      NEPHROURETERECTOMY Right 12/7/2021    Procedure: RIGHT NEPHROURETERECTOMY LAPAROSCOPIC WITH DAVINCI ROBOT;  Surgeon: José Miguel Villafuerte Jr., MD;  Location:  [facility];  Service: Robotics - DaVinci;  Laterality: Right;    OTHER SURGICAL HISTORY      Porcine Valve    PROSTATE SURGERY      PROSTATECTOMY  2000     History of Prostatectomy Perineal Radical    SKIN CANCER EXCISION      from head and lip    TONSILLECTOMY         Family History:  family history includes Breast cancer in an other family member; Cancer in his mother and another family member; Diabetes in his mother and another family member; Heart disease in his father; Hypertension in an other family member; Lung cancer in his mother; Migraines in an other family member; Obesity in an other family member.     Social History:  reports that he has never smoked. He has never been exposed to tobacco smoke. He has never used smokeless tobacco. He reports that  he does not drink alcohol and does not use drugs.  Social History     Social History Narrative    Right hand dominant       Medications:  Calcium Carb-Cholecalciferol, Dulaglutide, allopurinol, amiodarone, bumetanide, clindamycin, empagliflozin, enzalutamide, fluticasone, insulin glargine, ketoconazole, metoprolol succinate XL, multivitamin with minerals, ondansetron, pravastatin, and sacubitril-valsartan    Allergies   Allergen Reactions    Ampicillin Anaphylaxis    Medrol [Methylprednisolone] Unknown - High Severity     Made his blood sugar get really high, can't take this    Myrbetriq [Mirabegron] Unknown - High Severity     High BP    Penicillins Anaphylaxis and Shortness Of Breath    Bee Venom Swelling    Melatonin Hallucinations       Objective  Objective     Vital Signs:   Temp:  [98.7 °F (37.1 °C)] 98.7 °F (37.1 °C)  Heart Rate:  [72-95] 92  Resp:  [16] 16  BP: (104-144)/(55-90) 117/69    Physical Exam   Constitutional: Awake, alert  Eyes: PERRLA, sclerae anicteric, no conjunctival injection  HENT: NCAT, mucous membranes moist  Neck: Supple, no thyromegaly, no lymphadenopathy, trachea midline  Respiratory: Clear to auscultation bilaterally, nonlabored respirations, room air   Cardiovascular: RRR, no murmurs, rubs, or gallops, palpable pedal pulses bilaterally  Gastrointestinal: Positive bowel sounds, soft, nontender, nondistended  Musculoskeletal: No bilateral ankle edema, no clubbing or cyanosis to extremities  Psychiatric: Appropriate affect, cooperative  Neurologic: Oriented x 3, strength symmetric in all extremities, Cranial Nerves grossly intact to confrontation, speech clear  Skin: No rashes, erythema and abrasion left knee, shearing abrasions lateral LLE and left elbows, skin tear anterior left shin      Result Review:  I have personally reviewed the results from the time of this admission to 11/29/2023 17:07 EST and agree with these findings:  [x]  Laboratory list / accordion  []  Microbiology  [x]   Radiology  [x]  EKG/Telemetry   [x]  Cardiology/Vascular   []  Pathology  [x]  Old records  []  Other:  Most notable findings include:     LAB RESULTS:      Lab 11/29/23  1357 11/28/23 1929   WBC 9.42 9.80   HEMOGLOBIN 14.3 13.8   HEMATOCRIT 44.7 42.0   PLATELETS 88* 84*   NEUTROS ABS 7.54* 7.31*  7.55*   IMMATURE GRANS (ABS)  --  0.61*   LYMPHS ABS  --  0.45*   MONOS ABS  --  1.00*   EOS ABS 0.09 0.19  0.10   MCV 94.1 92.3         Lab 11/29/23  1357 11/28/23 1929   SODIUM 131* 130*   POTASSIUM 5.2 5.1   CHLORIDE 100 101   CO2 19.0* 17.6*   ANION GAP 12.0 11.4   BUN 28* 29*   CREATININE 1.64* 1.54*   EGFR 40.5* 43.7*   GLUCOSE 202* 156*   CALCIUM 8.3* 8.1*   MAGNESIUM 2.5*  --    TSH 1.470  --          Lab 11/29/23  1357 11/28/23 1929   TOTAL PROTEIN 7.0 6.9   ALBUMIN 3.9 3.8   GLOBULIN 3.1 3.1   ALT (SGPT) 40 36   AST (SGOT) 55* 50*   BILIRUBIN 1.3* 1.2   ALK PHOS 219* 197*         Lab 11/29/23  1357   HSTROP T 32*                 Brief Urine Lab Results  (Last result in the past 365 days)        Color   Clarity   Blood   Leuk Est   Nitrite   Protein   CREAT   Urine HCG        11/28/23 1902 Yellow   Clear   Small (1+)   Negative   Negative   Trace                 Microbiology Results (last 10 days)       Procedure Component Value - Date/Time    COVID PRE-OP / PRE-PROCEDURE SCREENING ORDER (NO ISOLATION) - Swab, Nasopharynx [[id number]]  (Abnormal) Collected: 11/29/23 1400    Lab Status: Final result Specimen: Swab from Nasopharynx Updated: 11/29/23 1444    Narrative:      The following orders were created for panel order COVID PRE-OP / PRE-PROCEDURE SCREENING ORDER (NO ISOLATION) - Swab, Nasopharynx.  Procedure                               Abnormality         Status                     ---------                               -----------         ------                     COVID-19 and FLU A/B PCR...[[id number]]  Abnormal            Final result                 Please view results for these tests on the  individual orders.    COVID-19 and FLU A/B PCR, 1 HR TAT - Swab, Nasopharynx [[id number]]  (Abnormal) Collected: 11/29/23 1400    Lab Status: Final result Specimen: Swab from Nasopharynx Updated: 11/29/23 1444     COVID19 Detected     Influenza A PCR Not Detected     Influenza B PCR Not Detected    Narrative:      Fact sheet for providers: https://www.fda.gov/media/190354/download    Fact sheet for patients: https://www.fda.gov/media/779822/download    Test performed by PCR.  Influenza A and Influenza B negative results should be considered presumptive in samples that have a positive SARS-CoV-2 result.    Competitive inhibition studies showed that SARS-CoV-2 virus, when present at concentrations above 3.6E+04 copies/mL, can inhibit the detection and amplification of influenza A and influenza B virus RNA if present at or below 1.8E+02 copies/mL or 4.9E+02 copies/mL, respectively, and may lead to false negative influenza virus results. If co-infection with influenza A or influenza B virus is suspected in samples with a positive SARS-CoV-2 result, the sample should be re-tested with another FDA cleared, approved, or authorized influenza test, if influenza virus detection would change clinical management.            XR Chest 1 View    Result Date: 11/29/2023  XR CHEST 1 VW Date of Exam: 11/29/2023 2:03 PM EST Indication: Weak/Dizzy/AMS triage protocol Comparison: 11/23/2022 Findings: Previously seen pulmonary vascular congestion and bibasilar atelectasis has resolved. There is stable moderate cardiomegaly. Pulmonary vessels appear within normal limits on the current exam. Lung fields are clear. There are no effusions.    Impression: Impression: No acute process. Stable cardiomegaly. Electronically Signed: Bella Chapman MD  11/29/2023 2:24 PM EST  Workstation ID: [id number]    CT Head Without Contrast    Result Date: 11/28/2023  FINAL REPORT TECHNIQUE: Routine axial images through the head were obtained without contrast.  CLINICAL HISTORY: Positive LOC with fall FINDINGS: There is  mild generalized cerebral atrophy.  The ventricles are dilated, but proportionate to the degree of atrophy.  There is no evidence of hemorrhage, mass or other acute abnormality.  No acute osseous or sinus abnormality is seen. There is no skull fracture.     Impression: No acute intracranial abnormality. Authenticated and Electronically Signed by Willis Quiñones M.D. on 11/28/2023 07:42:04 PM     Results for orders placed during the hospital encounter of 10/25/22    Adult Transthoracic Echo Complete With Contrast if Necessary Per Protocol    Interpretation Summary    Left ventricular wall thickness is consistent with mild concentric hypertrophy.    The right ventricular cavity is dilated.    Left atrial volume is moderately increased.    There is a bioprosthetic aortic valve present.    Estimated right ventricular systolic pressure from tricuspid regurgitation is mildly elevated (35-45 mmHg). Calculated right ventricular systolic pressure from tricuspid regurgitation is 36 mmHg.    There is a prosthetic pulmonic valve present.    There is a large left pleural effusion.    Mild global LV hypokinesis    Ejection fraction estimated at 45 to 50%    Mild to moderate AI    Mild MR      Assessment & Plan  Assessment & Plan       Upper respiratory tract infection due to COVID-19 virus    Essential hypertension    Type 2 diabetes mellitus with hyperglycemia, with long-term current use of insulin    Sleep apnea    HLD (hyperlipidemia)    Stage 3b chronic kidney disease    Blane Dietz is a 88 y.o. 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 Ramon for continued weakness. Patient was seen in [facility] ED yesterday for weakness and a fall at home.  Today, his daughter called Dr. Ramon's office after patient slid out of bed trying to stand today at his assisted-living facility and had to have EMS get him off the floor, and daughter thought it might be related to chemotherapy. Patient presented to [facility] ED and was found to be positive for Covid-19.     Covid-19   Weakness, fall at home  -dx on 11/29, symptom onset 11/28  -WBC wnl but on abx, afebrile  -defer remdesevir as patient is on room air, minimally symptomatic, has one kidney and known CKD, elevated alk phos and bili  -defer dexamethasone as pt on room air  -O2 as needed for SOA to keep sat > 90%  -supportive care with scheduled Mucinex, prn tessalon perles, prn Robitussin  -CT head negative at [facility]  -PT and OT consult  -CM consult, pt from assisted living  -check baseline Covid labs-CRP, ferritin  -daily CBC w/ diff, CMP    Elevated troponin  -troponin 32, likely r/t CKD/supply-demand mismatch  -repeat troponin pending  -pt denies chest pain  -EKG strip not currently uploaded in Epic, reads as SSR with RBB    Elevated creatinine  CKD III  H/o right nephrectomy  -baseline ~1.45-1.5  -creatinine 1.64  -gentle IV fluids x 16 hours  -renally dosed allopurinol, heparin for dvt ppx  -AM CMP    Thrombocytopenia  -on chemotherapy, drop from 148 on 11/1 >> 88 today  -Lovenox for DVT ppx, CrCl acceptable    Prostate cancer with bone metastasis  H/o PEs  -hold home chemo meds  -follows with Dr. Ramon  -previously anticoagulated with Eliquis  -Lovenox for DVT ppx given h/o cancer    CHF  A-fib  H/o AVR  -last echo 10/2022 with EF 45-50%, mild concentric hypertrophy  -continue Entresto, Jardiance, metoprolol  -continue amiodarone  -free T4 elevated 2.06 but TSH wnl, recommend f/u with endocrinology as pt has known h/o a-fib    Prolonged QTc  -QTc 526  -patient on amiodarone, has RBB at baseline  -avoid QT-prolonging meds, dexamethasone prn for nausea    Left shin wound  Skin abrasions LLE and left  elbow  -continue Bactrim and clindamycin  -add probiotic  -WOC consult    HTN  HLD  -continue Entresto, metoprolol, statin    DM2  -A1c 8.8  -follow with endocrinology, on Trulicity 1.5 mg weekly, Lantus 50 units daily, and Jardiance 25 mg daily at home  -start basal bolus regimen with Levemir 20 units daily plus SSI  -continue Jardiance    HUSSAIN  -CPAP      DVT prophylaxis:  Medical    CODE STATUS:    Medical Intervention Limits: NO intubation (DNI); NO artificial nutrition  Code Status (Patient has no pulse and is not breathing): CPR (Attempt to Resuscitate)  Medical Interventions (Patient has pulse or is breathing): Limited Support      Expected Discharge  Expected Discharge Date: 12/2/2023; Expected Discharge Time:       Signature: Electronically signed by HIPOLITO Pierson, 11/29/23, 5:13 PM EST    Patient seen and examined at the bedside.  Patient is a 88-year-old  male with past medical history significant for prostate cancer with metastasis to the bones, diffuse large B cell lymphoma for which patient follows with Dr. Dove, hypertension, hyperlipidemia, type 2 diabetes mellitus, chronic congestive heart failure, atrial fibrillation on Eliquis and chronic kidney disease.  Patient was seen at the local emergency room yesterday after he had a fall and hit his head against the floor and had some left arm skin abrasions.  Patient was evaluated at the emergency room and then was sent home.  Evidently patient had another fall today or sled down to the floor from bed and as a result patient was brought to the emergency room here at [facility] at the recommendation of Dr. Dove.  Here initial evaluation at the emergency room revealed that patient has COVID 19 infection.  Respiratory wise patient is asymptomatic.  No cough and saturating in upper 90s on room air.    Physical exam:  Constitutional: No acute distress, awake, alert  HENT: NCAT, mucous membranes moist  Respiratory: Clear  to auscultation bilaterally, respiratory effort normal   Cardiovascular: RRR, no murmurs, rubs, or gallops  Gastrointestinal: Positive bowel sounds, soft, nontender, nondistended  Musculoskeletal: No bilateral ankle edema  Psychiatric: Appropriate affect, cooperative  Neurologic: Oriented x 3, strength symmetric in all extremities, Cranial Nerves grossly intact to confrontation, speech clear  Skin: No rashes     Assessment and plan:  88-year-old male with history of prostate cancer with metastasis to the bones, diffuse large B cell lymphoma, recent weakness and falls.  Patient was brought to the emergency room here admitted to [facility] and initial evaluation revealed that patient is positive for COVID-19.  However, patient does not have any respiratory symptoms.  He is saturating well on room air.  Will admit the patient has inpatient.  Please see the above for more details.    Total time spent was 75 minutes.              Electronically signed by Delvin Wolf MD at 23 [phone]          Physician Progress Notes (most recent note)        Saima Blanco DO at 23 1503              [facility] Medicine Services  PROGRESS NOTE    Patient Name: Blane Dietz  : 1935  MRN: [id number]    Date of Admission: 2023  Primary Care Physician: David Em MD    Subjective   Subjective     CC:  Generalized weakness, frequent falls    HPI:  Evaluated patient this morning. Regarding COVID symptoms, patient currently reporting sore throat and mild congestion. Denies cough and shortness of breath. Was getting ready to eat breakfast. Admits that he has felt weakness in his legs at home.      Objective   Objective     Vital Signs:   Temp:  [97.7 °F (36.5 °C)-98.2 °F (36.8 °C)] 97.7 °F (36.5 °C)  Heart Rate:  [86-97] 86  Resp:  [16-18] 17  BP: ()/(59-90) 112/64  Flow (L/min):  [22-37] 37     Physical Exam:  Constitutional: No acute distress, awake,  alert  HENT: NCAT, mucous membranes moist  Respiratory: Clear to auscultation bilaterally, respiratory effort normal   Cardiovascular: RRR, no murmurs, rubs, or gallops  Gastrointestinal: Positive bowel sounds, soft, nontender, nondistended  Musculoskeletal: No bilateral ankle edema  Neurologic: Alert and oriented x 3, no focal deficits, speech clear  Skin: No rashes      Results Reviewed:  LAB RESULTS:      Lab 11/30/23 0319 11/29/23 1748 11/29/23 1357 11/28/23 1929   WBC 8.03 8.48 9.42 9.80   HEMOGLOBIN 13.6 11.8* 14.3 13.8   HEMATOCRIT 41.0 37.1* 44.7 42.0   PLATELETS 85* 67* 88* 84*   NEUTROS ABS 5.94 5.79 7.54* 7.31*  7.55*   IMMATURE GRANS (ABS)  --  0.63*  --  0.61*   LYMPHS ABS  --  0.93  --  0.45*   MONOS ABS  --  0.89  --  1.00*   EOS ABS 0.32 0.10 0.09 0.19  0.10   MCV 91.3 96.6 94.1 92.3   CRP  --   --  2.91*  --          Lab 11/30/23 0319 11/29/23 1357 11/28/23 1929   SODIUM 132* 131* 130*   POTASSIUM 4.4 5.2 5.1   CHLORIDE 102 100 101   CO2 20.0* 19.0* 17.6*   ANION GAP 10.0 12.0 11.4   BUN 30* 28* 29*   CREATININE 1.52* 1.64* 1.54*   EGFR 44.3* 40.5* 43.7*   GLUCOSE 93 202* 156*   CALCIUM 8.0* 8.3* 8.1*   MAGNESIUM  --  2.5*  --    TSH  --  1.470  --          Lab 11/30/23 0319 11/29/23 1357 11/28/23 1929   TOTAL PROTEIN 6.1 7.0 6.9   ALBUMIN 3.3* 3.9 3.8   GLOBULIN 2.8 3.1 3.1   ALT (SGPT) 32 40 36   AST (SGOT) 49* 55* 50*   BILIRUBIN 0.9 1.3* 1.2   ALK PHOS 187* 219* 197*         Lab 11/29/23 1748 11/29/23  1357 11/28/23  1929   HSTROP T 27* 32* 25*             Lab 11/29/23  1357   FERRITIN 290.50         Brief Urine Lab Results  (Last result in the past 365 days)        Color   Clarity   Blood   Leuk Est   Nitrite   Protein   CREAT   Urine HCG        11/28/23 1902 Yellow   Clear   Small (1+)   Negative   Negative   Trace                   Microbiology Results Abnormal       None            XR Chest 1 View    Result Date: 11/29/2023  XR CHEST 1 VW Date of Exam: 11/29/2023 2:03 PM EST  Indication: Weak/Dizzy/AMS triage protocol Comparison: 11/23/2022 Findings: Previously seen pulmonary vascular congestion and bibasilar atelectasis has resolved. There is stable moderate cardiomegaly. Pulmonary vessels appear within normal limits on the current exam. Lung fields are clear. There are no effusions.    Impression: Impression: No acute process. Stable cardiomegaly. Electronically Signed: Bella Chapman MD  11/29/2023 2:24 PM EST  Workstation ID: [id number]    CT Head Without Contrast    Result Date: 11/28/2023  FINAL REPORT TECHNIQUE: Routine axial images through the head were obtained without contrast. CLINICAL HISTORY: Positive LOC with fall FINDINGS: There is  mild generalized cerebral atrophy.  The ventricles are dilated, but proportionate to the degree of atrophy.  There is no evidence of hemorrhage, mass or other acute abnormality.  No acute osseous or sinus abnormality is seen. There is no skull fracture.     Impression: No acute intracranial abnormality. Authenticated and Electronically Signed by Willis Quiñones M.D. on 11/28/2023 07:42:04 PM     Results for orders placed during the hospital encounter of 10/25/22    Adult Transthoracic Echo Complete With Contrast if Necessary Per Protocol    Interpretation Summary    Left ventricular wall thickness is consistent with mild concentric hypertrophy.    The right ventricular cavity is dilated.    Left atrial volume is moderately increased.    There is a bioprosthetic aortic valve present.    Estimated right ventricular systolic pressure from tricuspid regurgitation is mildly elevated (35-45 mmHg). Calculated right ventricular systolic pressure from tricuspid regurgitation is 36 mmHg.    There is a prosthetic pulmonic valve present.    There is a large left pleural effusion.    Mild global LV hypokinesis    Ejection fraction estimated at 45 to 50%    Mild to moderate AI    Mild MR      Current medications:  Scheduled Meds:allopurinol, 100 mg, Oral,  Daily  amiodarone, 200 mg, Oral, Daily  clindamycin, 300 mg, Oral, Q8H  empagliflozin, 25 mg, Oral, Daily  enoxaparin, 40 mg, Subcutaneous, Daily  guaiFENesin, 600 mg, Oral, Q12H  insulin detemir, 20 Units, Subcutaneous, Daily  insulin lispro, 2-9 Units, Subcutaneous, 4x Daily AC & at Bedtime  metoprolol succinate XL, 12.5 mg, Oral, Daily  pravastatin, 40 mg, Oral, Nightly  saccharomyces boulardii, 250 mg, Oral, BID  sacubitril-valsartan, 1 tablet, Oral, Q12H  senna-docusate sodium, 2 tablet, Oral, BID  sodium chloride, 10 mL, Intravenous, Q12H  Triple Antibiotic, 1 application , Apply externally, Once      Continuous Infusions:   PRN Meds:.  acetaminophen    aluminum-magnesium hydroxide-simethicone    benzocaine-menthol    benzonatate    senna-docusate sodium **AND** polyethylene glycol **AND** bisacodyl **AND** bisacodyl    calcium carbonate    dexAMETHasone    dextrose    dextrose    fluticasone    glucagon (human recombinant)    guaiFENesin-dextromethorphan    nitroglycerin    sodium chloride    sodium chloride    sodium chloride    Assessment & Plan   Assessment & Plan     Active Hospital Problems    Diagnosis  POA    **Upper respiratory tract infection due to COVID-19 virus [U07.1, J06.9]  Yes    Pneumonia due to COVID-19 virus [U07.1, J12.82]  Yes    Stage 3b chronic kidney disease [N18.32]  Yes    HLD (hyperlipidemia) [E78.5]  Yes    Sleep apnea [G47.30]  Yes    Type 2 diabetes mellitus with hyperglycemia, with long-term current use of insulin [E11.65, Z79.4]  Not Applicable    Essential hypertension [I10]  Yes      Resolved Hospital Problems   No resolved problems to display.        Brief Hospital Course to date:  Blane Dietz is a 88 y.o. 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 ED at the recommendation of  Dr. Ramon for continued weakness. Patient was seen in [facility] ED 11/28 for weakness and a fall at home. On 11/29, his daughter called Dr. Ramon's office after patient slid out of bed trying to stand today at his assisted-living facility and had to have EMS get him off the floor, and daughter thought it might be related to chemotherapy. Patient presented to [facility] ED and was found to be positive for COVID-19.     This patient's problems and plans were partially entered by my partner and updated as appropriate by me 11/30/23.    COVID-19 infection  -Dx on 11/29, symptom onset 11/28  -WBC normal, afebrile with mild symptoms   -Deferred remdesevir as patient is on room air, minimally symptomatic, has one kidney and known CKD. Deferred dexamethasone as pt on room air.  -Continue supportive care with scheduled Mucinex, prn tessalon perles, prn Robitussin    Weakness, fall at home  -CT head negative at [facility]  -PT and OT following, assisting with evaluation.  -CM consulted, pt from assisted living    Elevated troponin  -troponin 32 down to 27, likely related to CKD/supply-demand mismatch  -pt denies chest pain  -EKG with normal sinus rhythm and right bundle branch block.     Elevated creatinine  CKD III  H/o right nephrectomy  -baseline ~1.45-1.5  -creatinine 1.64 on admission  -Received gentle IV fluids x 16 hours  -Continue renally dosed allopurinol, Lovenox for dvt ppx  -Monitor CMP daily.     Thrombocytopenia  -on chemotherapy, drop from 148 on 11/1 >> 88 on admission  -Lovenox for DVT ppx, CrCl acceptable    Elevated LFTs  Elevated alk phos  -Likely secondary to chemotherapy  -Continue to monitor daily CMP.     Prostate cancer with bone metastasis  Hx of PEs  -Follows with Dr. Ramon  -holding home chemo medication given we do not have on formulary.   -previously anticoagulated with Eliquis  -Lovenox for DVT ppx given h/o cancer     Chronic HFmrEF  Atrial fibrillation  H/o AVR  -last echo 10/2022 with EF 45-50%,  mild concentric hypertrophy  -continue Entresto, Jardiance, metoprolol  -continue amiodarone for atrial fibrillation  -free T4 elevated 2.06 but TSH wnl, recommend f/u with endocrinology as pt has known h/o a-fib     Prolonged QTc  -QTc 526 on admission  -patient on amiodarone, has RBB at baseline  -avoid additional QT-prolonging meds, dexamethasone prn for nausea     Left shin wound  Skin abrasions LLE and left elbow  -continue Bactrim and clindamycin  -added probiotic  -WOC evaluated, recommended to continue dressing changes every 3 days using Xeroform/silicone foam dressing.     HTN  HLD  -Continue metoprolol, statin     IDDM2  -A1c 8.8%  -follows with endocrinology, on Trulicity 1.5 mg weekly, Lantus 50 units daily, and Jardiance 25 mg daily at home  -started basal bolus regimen with Levemir 20 units daily plus SSI  -continue Jardiance     HUSSAIN  -CPAP    Expected Discharge Location and Transportation: pending PT/OT evaluations  Expected Discharge   Expected Discharge Date: 12/2/2023; Expected Discharge Time:      DVT prophylaxis:  Medical DVT prophylaxis orders are present.     AM-PAC 6 Clicks Score (PT): 18 (11/30/23 0978)    CODE STATUS:   Code Status and Medical Interventions:   Ordered at: 11/29/23 1641     Medical Intervention Limits:    NO intubation (DNI)    NO artificial nutrition     Code Status (Patient has no pulse and is not breathing):    CPR (Attempt to Resuscitate)     Medical Interventions (Patient has pulse or is breathing):    Limited Support       Saima Blanco DO  11/30/23        Electronically signed by Saima Blanco DO at 11/30/23 1521          Physical Therapy Notes (most recent note)        Aye Qureshi, PT at 12/01/23 1130  Version 1 of 1         Goal Outcome Evaluation:  Plan of Care Reviewed With: patient        Progress: improving  Outcome Evaluation: Physical therapy treatment complete. The patient continues to require additional time to complete mobility.  Patient improved tolerance to mobility. Patient increased ambulation distance compared to previous treatment session. Patient completed exercises in chair. The patient continues to present below baseline for functional mobility. The patient would continue to benefit from skilled PT to address strength, balance and activity tolerance deficits. Continue to current PT POC      Anticipated Discharge Disposition (PT): inpatient rehabilitation facility    Electronically signed by Aye Qureshi PT at 23 1317          Occupational Therapy Notes (most recent note)        Dary Calle, OT at 23 7630          Patient Name: Blane Dietz  : 1935    MRN: [id number]                              Today's Date: 2023       Admit Date: 2023    Visit Dx:     ICD-10-CM ICD-9-CM   1. COVID-19  U07.1 079.89   2. Generalized weakness  R53.1 780.79   3. On antineoplastic chemotherapy  Z79.899 V58.69   4. History of diabetes mellitus  Z86.39 V12.29   5. Pneumonia due to COVID-19 virus  U07.1 480.8    J12.82 079.89   6. Upper respiratory tract infection due to COVID-19 virus  U07.1 465.9    J06.9 079.89   7. Stage 3b chronic kidney disease  N18.32 585.3   8. Essential hypertension  I10 401.9   9. Type 2 diabetes mellitus with hyperglycemia, with long-term current use of insulin  E11.65 250.00    Z79.4 790.29     V58.67     Patient Active Problem List   Diagnosis    Essential hypertension    Type 2 diabetes mellitus with hyperglycemia, with long-term current use of insulin    Prostate cancer    Aortic stenosis    Sleep apnea    S/P AVR    HLD (hyperlipidemia)    Renal mass    s/p right nepheroureterectomy and adrenalectomy     Diffuse large B-cell lymphoma of solid organ excluding spleen    PICC (peripherally inserted central catheter) flush    History of pulmonary embolism    History of malignant neoplasm of prostate    Lymphoma of kidney    Stage 3b chronic kidney disease    Upper respiratory tract  infection due to COVID-19 virus    Pneumonia due to COVID-19 virus     Past Medical History:   Diagnosis Date    Aortic stenosis     status post ROSS procedure, remote, with December 2015 echocardiography revealing normal aortic and pulmonary valve function, normal ejection fraction and mild to moderate MR    Arthritis     Bilateral impacted cerumen 11/23/2016    Bronchitis     CHF (congestive heart failure)     Chronic gout of right foot 06/13/2016    Impression: 03/08/2016 - stable.;     COVID-19 vaccine series completed 05/12/2021    [place] 2nd dose 3/12/21.    Depression     Diabetes mellitus     Diverticulitis     Exogenous obesity     Gout     Heart murmur     History of vitamin D deficiency     Hypercholesteremia 08/11/2016    Hyperlipidemia     Hypertension     Kidney lesion     Malignant neoplasm of prostate     Morbid obesity 08/11/2016    Pneumonia 05/12/2021    Primary osteoarthritis involving multiple joints 06/13/2016    Impression: 03/08/2016 - stable;     Pulmonary embolism     Sleep apnea 05/12/2021    C-Pap    Uncontrolled type 2 diabetes mellitus with hyperglycemia 06/13/2016    Impression: 03/08/2016 - seeing Endo .;     Vertigo      Past Surgical History:   Procedure Laterality Date    CARDIAC VALVE REPLACEMENT  05/12/2021    Ross procedure 22 years ago    COLON SURGERY      COLONOSCOPY      COLOSTOMY  1999    COLOSTOMY REVISION      CYSTOSCOPY N/A 12/7/2021    Procedure: CYSTOSCOPY FLEXIBLE;  Surgeon: José Miguel Villafuerte Jr., MD;  Location: [facility] OR;  Service: Urology;  Laterality: N/A;    EXPLORATORY LAPAROTOMY      With Tissue Removal    LIPOMA EXCISION      MOHS SURGERY      NEPHROURETERECTOMY Right 12/7/2021    Procedure: RIGHT NEPHROURETERECTOMY LAPAROSCOPIC WITH DAVINCI ROBOT;  Surgeon: José Miguel Villafuerte Jr., MD;  Location:  [facility] OR;  Service: Robotics - DaVinci;  Laterality: Right;    OTHER SURGICAL HISTORY      Porcine Valve    PROSTATE SURGERY      PROSTATECTOMY  2000      History of Prostatectomy Perineal Radical    SKIN CANCER EXCISION      from head and lip    TONSILLECTOMY        General Information       Row Name 12/01/23 0927          OT Time and Intention    Document Type therapy note (daily note)  -     Mode of Treatment occupational therapy  -       Row Name 12/01/23 0927          General Information    Existing Precautions/Restrictions fall  covid  -       Row Name 12/01/23 0927          Cognition    Orientation Status (Cognition) oriented x 3  -       Row Name 12/01/23 0927          Safety Issues, Functional Mobility    Impairments Affecting Function (Mobility) balance;endurance/activity tolerance;strength  -               User Key  (r) = Recorded By, (t) = Taken By, (c) = Cosigned By      Initials Name Provider Type     Dary Calle OT Occupational Therapist                     Mobility/ADL's       Row Name 12/01/23 0952          Transfers    Transfers sit-stand transfer  -       Row Name 12/01/23 0952          Sit-Stand Transfer    Sit-Stand [place] (Transfers) verbal cues;standby assist  -     Assistive Device (Sit-Stand Transfers) walker, front-wheeled  -     Comment, (Sit-Stand Transfer) Vcs fro hand placement  -       Row Name 12/01/23 0952          Functional Mobility    Functional Mobility- Ind. Level verbal cues required;contact guard assist  -     Functional Mobility- Device walker, front-wheeled  -     Functional Mobility-Distance (Feet) --  in room  -     Functional Mobility- Safety Issues step length decreased  -     Functional Mobility- Comment pt less fearful with ambulation today  -       Row Name 12/01/23 0952          Activities of Daily Living    BADL Assessment/Intervention lower body dressing  -       Row Name 12/01/23 0952          Lower Body Dressing Assessment/Training    [place] Level (Lower Body Dressing) don;socks;standby assist  -     Position (Lower Body Dressing) edge of bed sitting  -                User Key  (r) = Recorded By, (t) = Taken By, (c) = Cosigned By      Initials Name Provider Type    Dary Ayala, OT Occupational Therapist                   Obj/Interventions       Row Name 12/01/23 0953          Shoulder (Therapeutic Exercise)    Shoulder (Therapeutic Exercise) AROM (active range of motion)  -     Shoulder AROM (Therapeutic Exercise) bilateral;flexion;extension;horizontal aBduction/aDduction;10 repetitions  -       Row Name 12/01/23 0953          Elbow/Forearm (Therapeutic Exercise)    Elbow/Forearm (Therapeutic Exercise) AROM (active range of motion)  -     Elbow/Forearm AROM (Therapeutic Exercise) bilateral;flexion;extension;10 repetitions  -       Row Name 12/01/23 0953          Motor Skills    Therapeutic Exercise shoulder;elbow/forearm  -       Row Name 12/01/23 0953          Balance    Balance Assessment sitting static balance;sitting dynamic balance  -     Static Sitting Balance independent  -     Dynamic Sitting Balance standby assist  -AC     Position, Sitting Balance unsupported  -     Static Standing Balance standby assist  -AC     Dynamic Standing Balance contact guard  -     Position/Device Used, Standing Balance walker, rolling  -AC               User Key  (r) = Recorded By, (t) = Taken By, (c) = Cosigned By      Initials Name Provider Type    Dary Ayala, OT Occupational Therapist                   Goals/Plan    No documentation.                  Clinical Impression       Row Name 12/01/23 0954          Pain Assessment    Pretreatment Pain Rating 0/10 - no pain  -     Posttreatment Pain Rating 0/10 - no pain  -       Row Name 12/01/23 0954          Plan of Care Review    Plan of Care Reviewed With patient  -     Progress improving  -     Outcome Evaluation Pt with increased act tolerance from previous tx, and progressed from min A to SBA LBD,  SBA STS, CGA to ambulate in room with RW.  Pt with good progress, but continues below baseline with  self care/mobility d/t weakness, decr balance, and act toelrance. Recommend IP rehab upon d/c for best outcome.  -       Row Name 12/01/23 0954          Therapy Assessment/Plan (OT)    Rehab Potential (OT) good, to achieve stated therapy goals  -     Criteria for Skilled Therapeutic Interventions Met (OT) yes;skilled treatment is necessary  -AC     Therapy Frequency (OT) daily  -       Row Name 12/01/23 0954          Therapy Plan Review/Discharge Plan (OT)    Anticipated Discharge Disposition (OT) inpatient rehabilitation facility  -       Row Name 12/01/23 0954          Vital Signs    Pretreatment Heart Rate (beats/min) 92  -AC     Posttreatment Heart Rate (beats/min) 93  -AC     Pre SpO2 (%) 92  -AC     O2 Delivery Pre Treatment room air  -AC     O2 Delivery Intra Treatment room air  -AC     Post SpO2 (%) 96  -AC     Pre Patient Position Sitting  -AC     Post Patient Position Sitting  -       Row Name 12/01/23 0954          Positioning and Restraints    Pre-Treatment Position in bed  sititng EOB  -AC     In Chair notified nsg;sitting;call light within reach;encouraged to call for assist  -AC               User Key  (r) = Recorded By, (t) = Taken By, (c) = Cosigned By      Initials Name Provider Type    AC Dary Calle, OT Occupational Therapist                   Outcome Measures       Row Name 12/01/23 0927          How much help from another is currently needed...    Putting on and taking off regular lower body clothing? 3  -AC     Bathing (including washing, rinsing, and drying) 3  -AC     Toileting (which includes using toilet bed pan or urinal) 3  -AC     Putting on and taking off regular upper body clothing 3  -AC     Taking care of personal grooming (such as brushing teeth) 3  -AC     Eating meals 4  -AC     AM-PAC 6 Clicks Score (OT) 19  -AC       Row Name 12/01/23 0927          Functional Assessment    Outcome Measure Options AM-PAC 6 Clicks Daily Activity (OT)  -AC               User Key  (r)  = Recorded By, (t) = Taken By, (c) = Cosigned By      Initials Name Provider Type     Dary Calle, OT Occupational Therapist                    Occupational Therapy Education       Title: PT OT SLP Therapies (In Progress)       Topic: Occupational Therapy (In Progress)       Point: ADL training (Done)       Description:   Instruct learner(s) on proper safety adaptation and remediation techniques during self care or transfers.   Instruct in proper use of assistive devices.                  Learning Progress Summary             Patient Acceptance, E, VU by  at 12/1/2023 0959    Acceptance, E, NR by  at 11/30/2023 1032                         Point: Home exercise program (Not Started)       Description:   Instruct learner(s) on appropriate technique for monitoring, assisting and/or progressing therapeutic exercises/activities.                  Learner Progress:  Not documented in this visit.              Point: Precautions (Not Started)       Description:   Instruct learner(s) on prescribed precautions during self-care and functional transfers.                  Learner Progress:  Not documented in this visit.              Point: Body mechanics (Not Started)       Description:   Instruct learner(s) on proper positioning and spine alignment during self-care, functional mobility activities and/or exercises.                  Learner Progress:  Not documented in this visit.                              User Key       Initials Effective Dates Name Provider Type Discipline     02/03/23 -  Dary Calle OT Occupational Therapist OT                  OT Recommendation and Plan  Planned Therapy Interventions (OT): activity tolerance training, BADL retraining, functional balance retraining, occupation/activity based interventions, patient/caregiver education/training, strengthening exercise, transfer/mobility retraining  Therapy Frequency (OT): daily  Plan of Care Review  Plan of Care Reviewed With: patient  Progress:  improving  Outcome Evaluation: Pt with increased act tolerance from previous tx, and progressed from min A to SBA LBD,  SBA STS, CGA to ambulate in room with RW.  Pt with good progress, but continues below baseline with self care/mobility d/t weakness, decr balance, and act [place]. Recommend IP rehab upon d/c for best outcome.     Time Calculation:   Evaluation Complexity (OT)  Review Occupational Profile/Medical/Therapy History Complexity: brief/low complexity  Assessment, Occupational Performance/Identification of Deficit Complexity: 1-3 performance deficits  Clinical Decision Making Complexity (OT): problem focused assessment/low complexity  Overall Complexity of Evaluation (OT): low complexity     Time Calculation- OT       Row Name 12/01/23 0927             Time Calculation- OT    OT Start Time 0927  -AC      OT Received On 12/01/23  -AC      OT Goal Re-Cert Due Date 12/10/23  -AC         Timed Charges    41333 - OT Therapeutic Exercise Minutes 8  -AC      62520 - OT Therapeutic Activity Minutes 15  -AC         Total Minutes    Timed Charges Total Minutes 23  -AC       Total Minutes 23  -AC                User Key  (r) = Recorded By, (t) = Taken By, (c) = Cosigned By      Initials Name Provider Type    AC Dary Calle OT Occupational Therapist                  Therapy Charges for Today       Code Description Service Date Service Provider Modifiers Qty    21911203787 HC OT EVAL LOW COMPLEXITY 4 11/30/2023 Dary Calle OT GO 1    83423951543 HC OT THER PROC EA 15 MIN 12/1/2023 Dary Calle OT GO 1    50602193872 HC OT THERAPEUTIC ACT EA 15 MIN 12/1/2023 Dary Calle OT GO 1                 Dary Calle OT  12/1/2023    Electronically signed by Dary Calle OT at 12/01/23 1001

## 2023-12-01 NOTE — PROGRESS NOTES
[facility] Medicine Services  PROGRESS NOTE    Patient Name: Blane Dietz  : 1935  MRN: [id number]    Date of Admission: 2023  Primary Care Physician: David Em MD    Subjective   Subjective     CC:  Generalized weakness, frequent falls    HPI:  Up in chair. NAD. No new needs. Respiratory status at baseline, no soa, on room air. Mild non productive cough. No pain. Eating well. Eager to go to rehab.       Objective   Objective     Vital Signs:   Temp:  [97 °F (36.1 °C)-98 °F (36.7 °C)] 97.7 °F (36.5 °C)  Heart Rate:  [] 88  Resp:  [16-20] 16  BP: ()/(53-82) 103/66     Physical Exam:  Constitutional: No acute distress, awake, alert, up in chair   HENT: NCAT, mucous membranes moist  Respiratory: Clear to auscultation bilaterally, respiratory effort normal on RA  Cardiovascular: RRR, no murmurs, rubs, or gallops  Gastrointestinal: Positive bowel sounds, soft, nontender, nondistended  Musculoskeletal: No bilateral ankle edema  Psychiatric: Appropriate affect, cooperative  Neurologic: Oriented x 3, ELLIOTT, speech clear  Skin: No rashes, left anterior shin with mepilex         Results Reviewed:  LAB RESULTS:      Lab 23  0356 23  0319 23  1748 23  1357 23  1929   WBC 8.10 8.03 8.48 9.42 9.80   HEMOGLOBIN 12.9* 13.6 11.8* 14.3 13.8   HEMATOCRIT 39.9 41.0 37.1* 44.7 42.0   PLATELETS 90* 85* 67* 88* 84*   NEUTROS ABS 4.13 5.94 5.79 7.54* 7.31*  7.55*   IMMATURE GRANS (ABS)  --   --  0.63*  --  0.61*   LYMPHS ABS  --   --  0.93  --  0.45*   MONOS ABS  --   --  0.89  --  1.00*   EOS ABS 0.32 0.32 0.10 0.09 0.19  0.10   MCV 95.2 91.3 96.6 94.1 92.3   CRP  --   --   --  2.91*  --          Lab 23  1025 23  0722 23  0319 23  1357 23  1929   SODIUM  --  136 132* 131* 130*   POTASSIUM  --  4.6 4.4 5.2 5.1   CHLORIDE  --  108* 102 100 101   CO2  --  16.0* 20.0* 19.0* 17.6*   ANION GAP  --  12.0 10.0 12.0 11.4   BUN   --  41* 30* 28* 29*   CREATININE  --  1.71* 1.52* 1.64* 1.54*   EGFR  --  38.5* 44.3* 40.5* 43.7*   GLUCOSE  --  97 93 202* 156*   CALCIUM  --  7.7* 8.0* 8.3* 8.1*   IONIZED CALCIUM 1.14  --   --   --   --    MAGNESIUM  --   --   --  2.5*  --    TSH  --   --   --  1.470  --          Lab 12/01/23  0722 11/30/23  0319 11/29/23  1357 11/28/23  1929   TOTAL PROTEIN 5.7* 6.1 7.0 6.9   ALBUMIN 3.5 3.3* 3.9 3.8   GLOBULIN 2.2 2.8 3.1 3.1   ALT (SGPT) 28 32 40 36   AST (SGOT) 45* 49* 55* 50*   BILIRUBIN 0.8 0.9 1.3* 1.2   ALK PHOS 195* 187* 219* 197*         Lab 11/29/23  1748 11/29/23  1357 11/28/23  1929   HSTROP T 27* 32* 25*             Lab 11/29/23  1357   FERRITIN 290.50         Brief Urine Lab Results  (Last result in the past 365 days)        Color   Clarity   Blood   Leuk Est   Nitrite   Protein   CREAT   Urine HCG        11/30/23 1620 Yellow   Clear   Negative   Negative   Negative   Negative                   Microbiology Results Abnormal       None            XR Chest 1 View    Result Date: 11/29/2023  XR CHEST 1 VW Date of Exam: 11/29/2023 2:03 PM EST Indication: Weak/Dizzy/AMS triage protocol Comparison: 11/23/2022 Findings: Previously seen pulmonary vascular congestion and bibasilar atelectasis has resolved. There is stable moderate cardiomegaly. Pulmonary vessels appear within normal limits on the current exam. Lung fields are clear. There are no effusions.    Impression: Impression: No acute process. Stable cardiomegaly. Electronically Signed: Bella Chapman MD  11/29/2023 2:24 PM EST  Workstation ID: [id number]     Results for orders placed during the hospital encounter of 10/25/22    Adult Transthoracic Echo Complete With Contrast if Necessary Per Protocol    Interpretation Summary    Left ventricular wall thickness is consistent with mild concentric hypertrophy.    The right ventricular cavity is dilated.    Left atrial volume is moderately increased.    There is a bioprosthetic aortic valve present.     Estimated right ventricular systolic pressure from tricuspid regurgitation is mildly elevated (35-45 mmHg). Calculated right ventricular systolic pressure from tricuspid regurgitation is 36 mmHg.    There is a prosthetic pulmonic valve present.    There is a large left pleural effusion.    Mild global LV hypokinesis    Ejection fraction estimated at 45 to 50%    Mild to moderate AI    Mild MR      Current medications:  Scheduled Meds:allopurinol, 100 mg, Oral, Daily  amiodarone, 200 mg, Oral, Daily  clindamycin, 300 mg, Oral, Q8H  empagliflozin, 25 mg, Oral, Daily  enoxaparin, 40 mg, Subcutaneous, Daily  guaiFENesin, 600 mg, Oral, Q12H  insulin detemir, 20 Units, Subcutaneous, Daily  insulin lispro, 2-9 Units, Subcutaneous, 4x Daily AC & at Bedtime  metoprolol succinate XL, 12.5 mg, Oral, Daily  pravastatin, 40 mg, Oral, Nightly  saccharomyces boulardii, 250 mg, Oral, BID  sacubitril-valsartan, 1 tablet, Oral, Q12H  senna-docusate sodium, 2 tablet, Oral, BID  sodium chloride, 10 mL, Intravenous, Q12H  Triple Antibiotic, 1 application , Apply externally, Once      Continuous Infusions:   PRN Meds:.  acetaminophen    aluminum-magnesium hydroxide-simethicone    benzocaine-menthol    benzonatate    senna-docusate sodium **AND** polyethylene glycol **AND** bisacodyl **AND** bisacodyl    calcium carbonate    dexAMETHasone    dextrose    dextrose    fluticasone    glucagon (human recombinant)    guaiFENesin-dextromethorphan    nitroglycerin    sodium chloride    sodium chloride    sodium chloride    Assessment & Plan   Assessment & Plan     Active Hospital Problems    Diagnosis  POA    **Upper respiratory tract infection due to COVID-19 virus [U07.1, J06.9]  Yes    Pneumonia due to COVID-19 virus [U07.1, J12.82]  Yes    Stage 3b chronic kidney disease [N18.32]  Yes    HLD (hyperlipidemia) [E78.5]  Yes    Sleep apnea [G47.30]  Yes    Type 2 diabetes mellitus with hyperglycemia, with long-term current use of insulin [E11.65,  Z79.4]  Not Applicable    Essential hypertension [I10]  Yes      Resolved Hospital Problems   No resolved problems to display.        Brief Hospital Course to date:  Blane Dietz is a 88 y.o. 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 Ramon for continued weakness. Patient was seen in [facility] ED 11/28 for weakness and a fall at home. On 11/29, his daughter called Dr. Ramon's office after patient slid out of bed trying to stand today at his assisted-living facility and had to have EMS get him off the floor, and daughter thought it might be related to chemotherapy. Patient presented to [facility] ED and was found to be positive for COVID-19.     This patient's problems and plans were partially entered by my partner and updated as appropriate by me 12/01/23.    COVID-19 infection  -Dx on 11/29, symptom onset 11/28  -WBC normal, afebrile with mild symptoms   -Deferred remdesevir as patient is on room air, minimally symptomatic, has one kidney and known CKD. Deferred dexamethasone as pt on room air.  -Continue supportive care with scheduled Mucinex, prn tessalon perles, prn Robitussin    Weakness, fall at home  -CT head negative at [facility]  -PT and OT following, assisting with evaluation.  -CM consulted, pt from assisted living    Elevated troponin  -troponin 32 down to 27, likely related to CKD/supply-demand mismatch  -pt denies chest pain  -EKG with normal sinus rhythm and right bundle branch block.     Elevated creatinine  CKD III  H/o right nephrectomy  -baseline ~1.45-1.5  -creatinine 1.64 on admission  -Received gentle IV fluids x 16 hours  -Continue renally dosed allopurinol, Lovenox for dvt ppx  -Monitor CMP daily.     Thrombocytopenia  -on chemotherapy, drop from 148 on 11/1 >> 88 on  admission  -Lovenox for DVT ppx, CrCl acceptable    Elevated LFTs  Elevated alk phos  -Likely secondary to chemotherapy  -Continue to monitor daily CMP.     Prostate cancer with bone metastasis  Hx of PEs  -Follows with Dr. Ramon  -holding home chemo medication given we do not have on formulary.   -previously anticoagulated with Eliquis  -Lovenox for DVT ppx given h/o cancer     Chronic HFmrEF  Atrial fibrillation  H/o AVR  -last echo 10/2022 with EF 45-50%, mild concentric hypertrophy  -continue Entresto, Jardiance, metoprolol  -continue amiodarone for atrial fibrillation  -free T4 elevated 2.06 but TSH wnl, recommend f/u with endocrinology as pt has known h/o a-fib     Prolonged QTc  -QTc 526 on admission  -patient on amiodarone, has RBB at baseline  -avoid additional QT-prolonging meds, dexamethasone prn for nausea     Left shin wound  Skin abrasions LLE and left elbow  -continue Bactrim and clindamycin  -added probiotic  -WOC evaluated, recommended to continue dressing changes every 3 days using Xeroform/silicone foam dressing.     HTN  HLD  -Continue metoprolol, statin     IDDM2  -A1c 8.8%  -follows with endocrinology, on Trulicity 1.5 mg weekly, Lantus 50 units daily, and Jardiance 25 mg daily at home  -started basal bolus regimen with Levemir 20 units daily plus SSI  -continue Jardiance     HUSSAIN  -CPAP    Expected Discharge Location and Transportation: rehab   Expected Discharge   Expected Discharge Date: 12/2/2023; Expected Discharge Time:      DVT prophylaxis:  Medical DVT prophylaxis orders are present.     AM-PAC 6 Clicks Score (PT): 19 (12/01/23 1130)    CODE STATUS:   Code Status and Medical Interventions:   Ordered at: 11/29/23 1641     Medical Intervention Limits:    NO intubation (DNI)    NO artificial nutrition     Code Status (Patient has no pulse and is not breathing):    CPR (Attempt to Resuscitate)     Medical Interventions (Patient has pulse or is breathing):    Limited Support       Sarika PHAM  Zachery, APRN  12/01/23

## 2023-12-01 NOTE — PLAN OF CARE
Goal Outcome Evaluation:  Plan of Care Reviewed With: patient        Progress: improving  Outcome Evaluation: Pt with increased act tolerance from previous tx, and progressed from min A to SBA LBD,  SBA STS, CGA to ambulate in room with RW.  Pt with good progress, but continues below baseline with self care/mobility d/t weakness, decr balance, and act toelrance. Recommend IP rehab upon d/c for best outcome.      Anticipated Discharge Disposition (OT): inpatient rehabilitation facility

## 2023-12-01 NOTE — THERAPY TREATMENT NOTE
Patient Name: Blane Dietz  : 1935    MRN: [id number]                              Today's Date: 2023       Admit Date: 2023    Visit Dx:     ICD-10-CM ICD-9-CM   1. COVID-19  U07.1 079.89   2. Generalized weakness  R53.1 780.79   3. On antineoplastic chemotherapy  Z79.899 V58.69   4. History of diabetes mellitus  Z86.39 V12.29   5. Pneumonia due to COVID-19 virus  U07.1 480.8    J12.82 079.89   6. Upper respiratory tract infection due to COVID-19 virus  U07.1 465.9    J06.9 079.89   7. Stage 3b chronic kidney disease  N18.32 585.3   8. Essential hypertension  I10 401.9   9. Type 2 diabetes mellitus with hyperglycemia, with long-term current use of insulin  E11.65 250.00    Z79.4 790.29     V58.67     Patient Active Problem List   Diagnosis    Essential hypertension    Type 2 diabetes mellitus with hyperglycemia, with long-term current use of insulin    Prostate cancer    Aortic stenosis    Sleep apnea    S/P AVR    HLD (hyperlipidemia)    Renal mass    s/p right nepheroureterectomy and adrenalectomy     Diffuse large B-cell lymphoma of solid organ excluding spleen    PICC (peripherally inserted central catheter) flush    History of pulmonary embolism    History of malignant neoplasm of prostate    Lymphoma of kidney    Stage 3b chronic kidney disease    Upper respiratory tract infection due to COVID-19 virus    Pneumonia due to COVID-19 virus     Past Medical History:   Diagnosis Date    Aortic stenosis     status post ROSS procedure, remote, with 2015 echocardiography revealing normal aortic and pulmonary valve function, normal ejection fraction and mild to moderate MR    Arthritis     Bilateral impacted cerumen 2016    Bronchitis     CHF (congestive heart failure)     Chronic gout of right foot 2016    Impression: 2016 - stable.;     COVID-19 vaccine series completed 2021    [place] 2nd dose 3/12/21.    Depression     Diabetes mellitus     Diverticulitis      Exogenous obesity     Gout     Heart murmur     History of vitamin D deficiency     Hypercholesteremia 08/11/2016    Hyperlipidemia     Hypertension     Kidney lesion     Malignant neoplasm of prostate     Morbid obesity 08/11/2016    Pneumonia 05/12/2021    Primary osteoarthritis involving multiple joints 06/13/2016    Impression: 03/08/2016 - stable;     Pulmonary embolism     Sleep apnea 05/12/2021    C-Pap    Uncontrolled type 2 diabetes mellitus with hyperglycemia 06/13/2016    Impression: 03/08/2016 - seeing Endo .;     Vertigo      Past Surgical History:   Procedure Laterality Date    CARDIAC VALVE REPLACEMENT  05/12/2021    Ross procedure 22 years ago    COLON SURGERY      COLONOSCOPY      COLOSTOMY  1999    COLOSTOMY REVISION      CYSTOSCOPY N/A 12/7/2021    Procedure: CYSTOSCOPY FLEXIBLE;  Surgeon: José Miguel Villafuerte Jr., MD;  Location: [facility] OR;  Service: Urology;  Laterality: N/A;    EXPLORATORY LAPAROTOMY      With Tissue Removal    LIPOMA EXCISION      MOHS SURGERY      NEPHROURETERECTOMY Right 12/7/2021    Procedure: RIGHT NEPHROURETERECTOMY LAPAROSCOPIC WITH DAVINCI ROBOT;  Surgeon: José Miguel Villafuerte Jr., MD;  Location:  [facility] OR;  Service: Robotics - DaVinci;  Laterality: Right;    OTHER SURGICAL HISTORY      Porcine Valve    PROSTATE SURGERY      PROSTATECTOMY  2000     History of Prostatectomy Perineal Radical    SKIN CANCER EXCISION      from head and lip    TONSILLECTOMY        General Information       Row Name 12/01/23 1130          Physical Therapy Time and Intention    Document Type therapy note (daily note)  -KW     Mode of Treatment physical therapy  -       Row Name 12/01/23 1130          General Information    Patient Profile Reviewed yes  -KW     Existing Precautions/Restrictions fall  -KW               User Key  (r) = Recorded By, (t) = Taken By, (c) = Cosigned By      Initials Name Provider Type    Aye Ramirez PT Physical Therapist                   Mobility        Row Name 12/01/23 1130          Sit-Stand Transfer    Sit-Stand [place] (Transfers) verbal cues;standby assist  -KW     Assistive Device (Sit-Stand Transfers) walker, front-wheeled  -KW       Row Name 12/01/23 1130          Gait/Stairs (Locomotion)    [place] Level (Gait) verbal cues;supervision  -KW     Assistive Device (Gait) walker, front-wheeled  -KW     Distance in Feet (Gait) 80  -KW     Deviations/Abnormal Patterns (Gait) gait speed decreased;stride length decreased;binta decreased  -KW     Bilateral Gait Deviations forward flexed posture  -KW               User Key  (r) = Recorded By, (t) = Taken By, (c) = Cosigned By      Initials Name Provider Type    Aye Ramirez, PT Physical Therapist                   Obj/Interventions    No documentation.                  Goals/Plan    No documentation.                  Clinical Impression       Row Name 12/01/23 1130          Pain    Pretreatment Pain Rating 0/10 - no pain  -KW       Row Name 12/01/23 1130          Plan of Care Review    Plan of Care Reviewed With patient  -KW     Progress improving  -KW     Outcome Evaluation Physical therapy treatment complete. The patient continues to require additional time to complete mobility. Patient improved tolerance to mobility. Patient increased ambulation distance compared to previous treatment session. Patient completed exercises in chair. The patient continues to present below baseline for functional mobility. The patient would continue to benefit from skilled PT to address strength, balance and activity tolerance deficits. Continue to current PT POC  -KW       Row Name 12/01/23 1130          Positioning and Restraints    Pre-Treatment Position sitting in chair/recliner  -KW     Post Treatment Position chair  -KW     In Chair reclined;call light within reach;encouraged to call for assist;legs elevated;exit alarm on  -KW               User Key  (r) = Recorded By, (t) = Taken By, (c) = Cosigned  By      Initials Name Provider Type    Aye Ramirez, AVINASH Physical Therapist                   Outcome Measures       Row Name 12/01/23 1130          How much help from another person do you currently need...    Turning from your back to your side while in flat bed without using bedrails? 4  -KW     Moving from lying on back to sitting on the side of a flat bed without bedrails? 3  -KW     Moving to and from a bed to a chair (including a wheelchair)? 3  -KW     Standing up from a chair using your arms (e.g., wheelchair, bedside chair)? 3  -KW     Climbing 3-5 steps with a railing? 3  -KW     To walk in hospital room? 3  -KW     AM-PAC 6 Clicks Score (PT) 19  -KW     Highest Level of Mobility Goal 6 --> Walk 10 steps or more  -KW       Row Name 12/01/23 1130 12/01/23 0927       Functional Assessment    Outcome Measure Options AM-PAC 6 Clicks Basic Mobility (PT)  -KW AM-PAC 6 Clicks Daily Activity (OT)  -AC              User Key  (r) = Recorded By, (t) = Taken By, (c) = Cosigned By      Initials Name Provider Type    Dary Ayala, OT Occupational Therapist    Aye Ramirez, AVINASH Physical Therapist                                 Physical Therapy Education       Title: PT OT SLP Therapies (In Progress)       Topic: Physical Therapy (Not Started)       Point: Mobility training (Not Started)       Learner Progress:  Not documented in this visit.              Point: Home exercise program (Not Started)       Learner Progress:  Not documented in this visit.              Point: Body mechanics (Not Started)       Learner Progress:  Not documented in this visit.              Point: Precautions (Not Started)       Learner Progress:  Not documented in this visit.                                  PT Recommendation and Plan     Plan of Care Reviewed With: patient  Progress: improving  Outcome Evaluation: Physical therapy treatment complete. The patient continues to require additional time to complete  mobility. Patient improved tolerance to mobility. Patient increased ambulation distance compared to previous treatment session. Patient completed exercises in chair. The patient continues to present below baseline for functional mobility. The patient would continue to benefit from skilled PT to address strength, balance and activity tolerance deficits. Continue to current PT POC     Time Calculation:   PT Evaluation Complexity  History, PT Evaluation Complexity: 3 or more personal factors and/or comorbidities  Examination of Body Systems (PT Eval Complexity): total of 3 or more elements  Clinical Presentation (PT Evaluation Complexity): evolving  Clinical Decision Making (PT Evaluation Complexity): moderate complexity  Overall Complexity (PT Evaluation Complexity): moderate complexity     PT Charges       Row Name 12/01/23 1130             Time Calculation    Start Time 1130  -KW      PT Received On 12/01/23  -KW         Timed Charges    62447 - PT Therapeutic Exercise Minutes 14  -KW      08374 - Gait Training Minutes  16  -KW         Total Minutes    Timed Charges Total Minutes 30  -KW       Total Minutes 30  -KW                User Key  (r) = Recorded By, (t) = Taken By, (c) = Cosigned By      Initials Name Provider Type    Aye Ramirez PT Physical Therapist                  Therapy Charges for Today       Code Description Service Date Service Provider Modifiers Qty    09348090844 HC PT EVAL MOD COMPLEXITY 4 11/30/2023 Aye Qureshi, PT GP 1    15253460381 HC PT THER PROC EA 15 MIN 12/1/2023 Aye Qureshi PT GP 1    89129857958 HC GAIT TRAINING EA 15 MIN 12/1/2023 Aye Qureshi PT GP 1            PT G-Codes  Outcome Measure Options: AM-PAC 6 Clicks Basic Mobility (PT)  AM-PAC 6 Clicks Score (PT): 19  AM-PAC 6 Clicks Score (OT): 19  PT Discharge Summary  Anticipated Discharge Disposition (PT): inpatient rehabilitation facility    Aye Qureshi PT  12/1/2023

## 2023-12-01 NOTE — PLAN OF CARE
Goal Outcome Evaluation:  Plan of Care Reviewed With: patient        Progress: no change            Problem: Adult Inpatient Plan of Care  Goal: Plan of Care Review  Outcome: Ongoing, Progressing  Flowsheets (Taken 12/1/2023 0305)  Progress: no change  Plan of Care Reviewed With: patient  Goal: Patient-Specific Goal (Individualized)  Outcome: Ongoing, Progressing  Goal: Absence of Hospital-Acquired Illness or Injury  Outcome: Ongoing, Progressing  Intervention: Identify and Manage Fall Risk  Recent Flowsheet Documentation  Taken 12/1/2023 0230 by Elsa Skinner, RN  Safety Promotion/Fall Prevention:   activity supervised   assistive device/personal items within reach   clutter free environment maintained   nonskid shoes/slippers when out of bed   room organization consistent   safety round/check completed  Taken 12/1/2023 0039 by Elsa Skinner, RN  Safety Promotion/Fall Prevention:   activity supervised   assistive device/personal items within reach   clutter free environment maintained   nonskid shoes/slippers when out of bed   room organization consistent   safety round/check completed  Taken 11/30/2023 2200 by Elsa Skinner, RN  Safety Promotion/Fall Prevention:   activity supervised   assistive device/personal items within reach   clutter free environment maintained   nonskid shoes/slippers when out of bed   room organization consistent   safety round/check completed  Taken 11/30/2023 2000 by Elsa Skinner, RN  Safety Promotion/Fall Prevention:   activity supervised   assistive device/personal items within reach   clutter free environment maintained   nonskid shoes/slippers when out of bed   room organization consistent   safety round/check completed  Intervention: Prevent Skin Injury  Recent Flowsheet Documentation  Taken 12/1/2023 0230 by Elsa Skinner, RN  Body Position: position changed independently  Skin Protection:   adhesive use limited   transparent dressing maintained  Taken  12/1/2023 0039 by Elsa Skinner RN  Body Position: position changed independently  Skin Protection:   adhesive use limited   transparent dressing maintained  Taken 11/30/2023 2200 by Elsa Skinner RN  Body Position: position changed independently  Skin Protection:   adhesive use limited   transparent dressing maintained  Taken 11/30/2023 2000 by Elsa Skinner RN  Body Position:   position changed independently   dangle, side of bed  Skin Protection:   adhesive use limited   transparent dressing maintained  Intervention: Prevent and Manage VTE (Venous Thromboembolism) Risk  Recent Flowsheet Documentation  Taken 12/1/2023 0230 by Elsa Skinner RN  Activity Management: activity encouraged  Taken 11/30/2023 2200 by Elsa Skinner RN  Activity Management: activity encouraged  Taken 11/30/2023 2000 by Elsa Skinner RN  Activity Management: activity encouraged  VTE Prevention/Management: (SQ Lovenox) other (see comments)  Range of Motion: active ROM (range of motion) encouraged  Intervention: Prevent Infection  Recent Flowsheet Documentation  Taken 11/30/2023 2000 by Elsa Skinner RN  Infection Prevention:   cohorting utilized   environmental surveillance performed   equipment surfaces disinfected   hand hygiene promoted   personal protective equipment utilized   rest/sleep promoted  Goal: Optimal Comfort and Wellbeing  Outcome: Ongoing, Progressing  Intervention: Monitor Pain and Promote Comfort  Recent Flowsheet Documentation  Taken 11/30/2023 2000 by Elsa Skinner RN  Pain Management Interventions: see MAR  Intervention: Provide Person-Centered Care  Recent Flowsheet Documentation  Taken 11/30/2023 2000 by Elsa Skinner RN  Trust Relationship/Rapport:   care explained   choices provided   questions answered   thoughts/feelings acknowledged  Goal: Readiness for Transition of Care  Outcome: Ongoing, Progressing     Problem: Skin Injury Risk Increased  Goal: Skin Health  and Integrity  Outcome: Ongoing, Progressing  Intervention: Optimize Skin Protection  Recent Flowsheet Documentation  Taken 12/1/2023 0230 by Elsa Skinner RN  Pressure Reduction Techniques: frequent weight shift encouraged  Head of Bed (HOB) Positioning: HOB elevated  Pressure Reduction Devices: pressure-redistributing mattress utilized  Skin Protection:   adhesive use limited   transparent dressing maintained  Taken 12/1/2023 0039 by Elsa Skinner RN  Pressure Reduction Techniques:   frequent weight shift encouraged   pressure points protected  Head of Bed (HOB) Positioning: HOB elevated  Pressure Reduction Devices: pressure-redistributing mattress utilized  Skin Protection:   adhesive use limited   transparent dressing maintained  Taken 11/30/2023 2200 by Elsa Skinner RN  Pressure Reduction Techniques:   frequent weight shift encouraged   pressure points protected  Head of Bed (HOB) Positioning: HOB lowered  Pressure Reduction Devices: pressure-redistributing mattress utilized  Skin Protection:   adhesive use limited   transparent dressing maintained  Taken 11/30/2023 2000 by Elsa Skinner RN  Pressure Reduction Techniques: frequent weight shift encouraged  Pressure Reduction Devices: pressure-redistributing mattress utilized  Skin Protection:   adhesive use limited   transparent dressing maintained     Problem: Fall Injury Risk  Goal: Absence of Fall and Fall-Related Injury  Outcome: Ongoing, Progressing  Intervention: Identify and Manage Contributors  Recent Flowsheet Documentation  Taken 12/1/2023 0230 by Elsa Skinner RN  Medication Review/Management: medications reviewed  Self-Care Promotion:   independence encouraged   BADL personal objects within reach  Taken 12/1/2023 0039 by Elsa Skinner RN  Medication Review/Management: medications reviewed  Self-Care Promotion:   independence encouraged   BADL personal objects within reach  Taken 11/30/2023 2200 by Brody  LORENA Hunter  Medication Review/Management: medications reviewed  Self-Care Promotion:   independence encouraged   BADL personal objects within reach  Taken 11/30/2023 2000 by Elsa Skinner RN  Medication Review/Management: medications reviewed  Self-Care Promotion:   independence encouraged   BADL personal objects within reach  Intervention: Promote Injury-Free Environment  Recent Flowsheet Documentation  Taken 12/1/2023 0230 by Elsa Skinner, RN  Safety Promotion/Fall Prevention:   activity supervised   assistive device/personal items within reach   clutter free environment maintained   nonskid shoes/slippers when out of bed   room organization consistent   safety round/check completed  Taken 12/1/2023 0039 by Elsa Skinner, RN  Safety Promotion/Fall Prevention:   activity supervised   assistive device/personal items within reach   clutter free environment maintained   nonskid shoes/slippers when out of bed   room organization consistent   safety round/check completed  Taken 11/30/2023 2200 by Elsa Skinner, RN  Safety Promotion/Fall Prevention:   activity supervised   assistive device/personal items within reach   clutter free environment maintained   nonskid shoes/slippers when out of bed   room organization consistent   safety round/check completed  Taken 11/30/2023 2000 by Elsa Skinner, RN  Safety Promotion/Fall Prevention:   activity supervised   assistive device/personal items within reach   clutter free environment maintained   nonskid shoes/slippers when out of bed   room organization consistent   safety round/check completed

## 2023-12-02 LAB
ALBUMIN SERPL-MCNC: 3.2 G/DL (ref 3.5–5.2)
ALBUMIN/GLOB SERPL: 1.1 G/DL
ALP SERPL-CCNC: 192 U/L (ref 39–117)
ALT SERPL W P-5'-P-CCNC: 24 U/L (ref 1–41)
ANION GAP SERPL CALCULATED.3IONS-SCNC: 11 MMOL/L (ref 5–15)
AST SERPL-CCNC: 40 U/L (ref 1–40)
BILIRUB SERPL-MCNC: 0.7 MG/DL (ref 0–1.2)
BUN SERPL-MCNC: 39 MG/DL (ref 8–23)
BUN/CREAT SERPL: 23.1 (ref 7–25)
CALCIUM SPEC-SCNC: 8 MG/DL (ref 8.6–10.5)
CHLORIDE SERPL-SCNC: 107 MMOL/L (ref 98–107)
CO2 SERPL-SCNC: 17 MMOL/L (ref 22–29)
CREAT SERPL-MCNC: 1.69 MG/DL (ref 0.76–1.27)
EGFRCR SERPLBLD CKD-EPI 2021: 39.1 ML/MIN/1.73
GLOBULIN UR ELPH-MCNC: 2.9 GM/DL
GLUCOSE BLDC GLUCOMTR-MCNC: 148 MG/DL (ref 70–130)
GLUCOSE SERPL-MCNC: 93 MG/DL (ref 65–99)
POTASSIUM SERPL-SCNC: 4.8 MMOL/L (ref 3.5–5.2)
PROT SERPL-MCNC: 6.1 G/DL (ref 6–8.5)
SODIUM SERPL-SCNC: 135 MMOL/L (ref 136–145)

## 2023-12-02 PROCEDURE — G0378 HOSPITAL OBSERVATION PER HR: HCPCS

## 2023-12-02 PROCEDURE — 96372 THER/PROPH/DIAG INJ SC/IM: CPT

## 2023-12-02 PROCEDURE — 63710000001 INSULIN DETEMIR PER 5 UNITS: Performed by: NURSE PRACTITIONER

## 2023-12-02 PROCEDURE — 25010000002 ENOXAPARIN PER 10 MG: Performed by: NURSE PRACTITIONER

## 2023-12-02 PROCEDURE — 80053 COMPREHEN METABOLIC PANEL: CPT | Performed by: STUDENT IN AN ORGANIZED HEALTH CARE EDUCATION/TRAINING PROGRAM

## 2023-12-02 PROCEDURE — 94660 CPAP INITIATION&MGMT: CPT

## 2023-12-02 PROCEDURE — 94799 UNLISTED PULMONARY SVC/PX: CPT

## 2023-12-02 PROCEDURE — 63710000001 INSULIN LISPRO (HUMAN) PER 5 UNITS: Performed by: NURSE PRACTITIONER

## 2023-12-02 PROCEDURE — 82948 REAGENT STRIP/BLOOD GLUCOSE: CPT

## 2023-12-02 PROCEDURE — 99233 SBSQ HOSP IP/OBS HIGH 50: CPT | Performed by: HOSPITALIST

## 2023-12-02 RX ADMIN — BENZONATATE 100 MG: 100 CAPSULE ORAL at 10:32

## 2023-12-02 RX ADMIN — CLINDAMYCIN HYDROCHLORIDE 300 MG: 150 CAPSULE ORAL at 05:49

## 2023-12-02 RX ADMIN — PRAVASTATIN SODIUM 40 MG: 40 TABLET ORAL at 21:53

## 2023-12-02 RX ADMIN — ENOXAPARIN SODIUM 40 MG: 40 INJECTION SUBCUTANEOUS at 10:30

## 2023-12-02 RX ADMIN — SACUBITRIL AND VALSARTAN 1 TABLET: 24; 26 TABLET, FILM COATED ORAL at 21:53

## 2023-12-02 RX ADMIN — GUAIFENESIN AND DEXTROMETHORPHAN 5 ML: 100; 10 SYRUP ORAL at 10:34

## 2023-12-02 RX ADMIN — SACUBITRIL AND VALSARTAN 1 TABLET: 24; 26 TABLET, FILM COATED ORAL at 10:33

## 2023-12-02 RX ADMIN — CLINDAMYCIN HYDROCHLORIDE 300 MG: 150 CAPSULE ORAL at 13:48

## 2023-12-02 RX ADMIN — CLINDAMYCIN HYDROCHLORIDE 300 MG: 150 CAPSULE ORAL at 21:53

## 2023-12-02 RX ADMIN — Medication 250 MG: at 21:53

## 2023-12-02 RX ADMIN — INSULIN DETEMIR 20 UNITS: 100 INJECTION, SOLUTION SUBCUTANEOUS at 10:33

## 2023-12-02 RX ADMIN — INSULIN LISPRO 2 UNITS: 100 INJECTION, SOLUTION INTRAVENOUS; SUBCUTANEOUS at 13:48

## 2023-12-02 RX ADMIN — ALLOPURINOL 100 MG: 100 TABLET ORAL at 10:31

## 2023-12-02 RX ADMIN — Medication 250 MG: at 10:31

## 2023-12-02 RX ADMIN — GUAIFENESIN 600 MG: 600 TABLET, EXTENDED RELEASE ORAL at 10:31

## 2023-12-02 RX ADMIN — EMPAGLIFLOZIN 25 MG: 25 TABLET, FILM COATED ORAL at 10:31

## 2023-12-02 RX ADMIN — Medication 10 ML: at 10:32

## 2023-12-02 RX ADMIN — Medication 10 ML: at 21:54

## 2023-12-02 RX ADMIN — AMIODARONE HYDROCHLORIDE 200 MG: 200 TABLET ORAL at 10:31

## 2023-12-02 NOTE — PLAN OF CARE
Goal Outcome Evaluation:  Plan of Care Reviewed With: patient        Progress: no change  Outcome Evaluation: vss, SR on tele, denies pain, medicated for cough with good response

## 2023-12-02 NOTE — PLAN OF CARE
Problem: Fall Injury Risk  Goal: Absence of Fall and Fall-Related Injury  Intervention: Promote Injury-Free Environment  Recent Flowsheet Documentation  Taken 12/2/2023 0400 by Kim Fortune, LORENA  Safety Promotion/Fall Prevention:   activity supervised   assistive device/personal items within reach   clutter free environment maintained   fall prevention program maintained   muscle strengthening facilitated   nonskid shoes/slippers when out of bed   safety round/check completed  Taken 12/2/2023 0200 by Kim Fortune, LORENA  Safety Promotion/Fall Prevention:   activity supervised   assistive device/personal items within reach   clutter free environment maintained   fall prevention program maintained   nonskid shoes/slippers when out of bed   safety round/check completed  Taken 12/2/2023 0000 by Kim Fortune RN  Safety Promotion/Fall Prevention:   activity supervised   assistive device/personal items within reach   clutter free environment maintained   fall prevention program maintained   nonskid shoes/slippers when out of bed   safety round/check completed  Taken 12/1/2023 2200 by Kim Fortune, LORENA  Safety Promotion/Fall Prevention:   assistive device/personal items within reach   activity supervised   clutter free environment maintained   fall prevention program maintained   nonskid shoes/slippers when out of bed   safety round/check completed  Taken 12/1/2023 2000 by Kim Fortune RN  Safety Promotion/Fall Prevention:   activity supervised   assistive device/personal items within reach   clutter free environment maintained   fall prevention program maintained   nonskid shoes/slippers when out of bed   Goal Outcome Evaluation:      Reviewed with patient and ongoing

## 2023-12-02 NOTE — PROGRESS NOTES
[facility] Medicine Services  PROGRESS NOTE    Patient Name: Blane Dietz  : 1935  MRN: [id number]    Date of Admission: 2023  Primary Care Physician: David Em MD    Subjective   Subjective     CC:  Generalized weakness, frequent falls    HPI:  Patient resting in bed, had just taken cpap off when I saw him and was stable on room air. Patient eating breakfast, no complaints.      Objective   Objective     Vital Signs:   Temp:  [97.5 °F (36.4 °C)-97.9 °F (36.6 °C)] 97.9 °F (36.6 °C)  Heart Rate:  [83-94] 83  Resp:  [17-18] 18  BP: (106-114)/(62-68) 106/62     Physical Exam:  Constitutional: No acute distress, awake, alert, up in chair   HENT: NCAT, mucous membranes moist  Respiratory: Clear to auscultation bilaterally, respiratory effort normal on RA  Cardiovascular: RRR, no murmurs, rubs, or gallops  Gastrointestinal: Positive bowel sounds, soft, nontender, nondistended  Musculoskeletal: No bilateral ankle edema  Psychiatric: Appropriate affect, cooperative  Neurologic: Oriented x 3, ELLIOTT, speech clear  Skin: No rashes, left anterior shin with mepilex     Results Reviewed:  LAB RESULTS:      Lab 23  0356 23  0319 23  1748 23  1357 23  1929   WBC 8.10 8.03 8.48 9.42 9.80   HEMOGLOBIN 12.9* 13.6 11.8* 14.3 13.8   HEMATOCRIT 39.9 41.0 37.1* 44.7 42.0   PLATELETS 90* 85* 67* 88* 84*   NEUTROS ABS 4.13 5.94 5.79 7.54* 7.31*  7.55*   IMMATURE GRANS (ABS)  --   --  0.63*  --  0.61*   LYMPHS ABS  --   --  0.93  --  0.45*   MONOS ABS  --   --  0.89  --  1.00*   EOS ABS 0.32 0.32 0.10 0.09 0.19  0.10   MCV 95.2 91.3 96.6 94.1 92.3   CRP  --   --   --  2.91*  --          Lab 23  0329 23  1025 23  0722 23  0319 23  1357 23  1929   SODIUM 135*  --  136 132* 131* 130*   POTASSIUM 4.8  --  4.6 4.4 5.2 5.1   CHLORIDE 107  --  108* 102 100 101   CO2 17.0*  --  16.0* 20.0* 19.0* 17.6*   ANION GAP 11.0  --  12.0 10.0  12.0 11.4   BUN 39*  --  41* 30* 28* 29*   CREATININE 1.69*  --  1.71* 1.52* 1.64* 1.54*   EGFR 39.1*  --  38.5* 44.3* 40.5* 43.7*   GLUCOSE 93  --  97 93 202* 156*   CALCIUM 8.0*  --  7.7* 8.0* 8.3* 8.1*   IONIZED CALCIUM  --  1.14  --   --   --   --    MAGNESIUM  --   --   --   --  2.5*  --    TSH  --   --   --   --  1.470  --          Lab 12/02/23  0329 12/01/23  0722 11/30/23  0319 11/29/23  1357 11/28/23  1929   TOTAL PROTEIN 6.1 5.7* 6.1 7.0 6.9   ALBUMIN 3.2* 3.5 3.3* 3.9 3.8   GLOBULIN 2.9 2.2 2.8 3.1 3.1   ALT (SGPT) 24 28 32 40 36   AST (SGOT) 40 45* 49* 55* 50*   BILIRUBIN 0.7 0.8 0.9 1.3* 1.2   ALK PHOS 192* 195* 187* 219* 197*         Lab 11/29/23  1748 11/29/23  1357 11/28/23  1929   HSTROP T 27* 32* 25*             Lab 11/29/23  1357   FERRITIN 290.50         Brief Urine Lab Results  (Last result in the past 365 days)        Color   Clarity   Blood   Leuk Est   Nitrite   Protein   CREAT   Urine HCG        11/30/23 1620 Yellow   Clear   Negative   Negative   Negative   Negative                   Microbiology Results Abnormal       None            No radiology results from the last 24 hrs    Results for orders placed during the hospital encounter of 10/25/22    Adult Transthoracic Echo Complete With Contrast if Necessary Per Protocol    Interpretation Summary    Left ventricular wall thickness is consistent with mild concentric hypertrophy.    The right ventricular cavity is dilated.    Left atrial volume is moderately increased.    There is a bioprosthetic aortic valve present.    Estimated right ventricular systolic pressure from tricuspid regurgitation is mildly elevated (35-45 mmHg). Calculated right ventricular systolic pressure from tricuspid regurgitation is 36 mmHg.    There is a prosthetic pulmonic valve present.    There is a large left pleural effusion.    Mild global LV hypokinesis    Ejection fraction estimated at 45 to 50%    Mild to moderate AI    Mild MR      Current  medications:  Scheduled Meds:allopurinol, 100 mg, Oral, Daily  amiodarone, 200 mg, Oral, Daily  clindamycin, 300 mg, Oral, Q8H  empagliflozin, 25 mg, Oral, Daily  enoxaparin, 40 mg, Subcutaneous, Daily  guaiFENesin, 600 mg, Oral, Q12H  insulin detemir, 20 Units, Subcutaneous, Daily  insulin lispro, 2-9 Units, Subcutaneous, 4x Daily AC & at Bedtime  metoprolol succinate XL, 12.5 mg, Oral, Daily  pravastatin, 40 mg, Oral, Nightly  saccharomyces boulardii, 250 mg, Oral, BID  sacubitril-valsartan, 1 tablet, Oral, Q12H  senna-docusate sodium, 2 tablet, Oral, BID  sodium chloride, 10 mL, Intravenous, Q12H  Triple Antibiotic, 1 application , Apply externally, Once      Continuous Infusions:   PRN Meds:.  acetaminophen    aluminum-magnesium hydroxide-simethicone    benzocaine-menthol    benzonatate    senna-docusate sodium **AND** polyethylene glycol **AND** bisacodyl **AND** bisacodyl    calcium carbonate    dexAMETHasone    dextrose    dextrose    fluticasone    glucagon (human recombinant)    guaiFENesin-dextromethorphan    nitroglycerin    sodium chloride    sodium chloride    sodium chloride    Assessment & Plan   Assessment & Plan     Active Hospital Problems    Diagnosis  POA    **Upper respiratory tract infection due to COVID-19 virus [U07.1, J06.9]  Yes    Pneumonia due to COVID-19 virus [U07.1, J12.82]  Yes    Stage 3b chronic kidney disease [N18.32]  Yes    HLD (hyperlipidemia) [E78.5]  Yes    Sleep apnea [G47.30]  Yes    Type 2 diabetes mellitus with hyperglycemia, with long-term current use of insulin [E11.65, Z79.4]  Not Applicable    Essential hypertension [I10]  Yes      Resolved Hospital Problems   No resolved problems to display.        Brief Hospital Course to date:  Blane Dietz is a 88 y.o. 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 Ramon for continued weakness. Patient was seen in [facility] ED 11/28 for weakness and a fall at home. On 11/29, his daughter called Dr. Ramon's office after patient slid out of bed trying to stand today at his assisted-living facility and had to have EMS get him off the floor, and daughter thought it might be related to chemotherapy. Patient presented to [facility] ED and was found to be positive for COVID-19.     This patient's problems and plans were partially entered by my partner and updated as appropriate by me 12/02/23.    All problems are new to me today.    COVID-19 infection  -Dx on 11/29, symptom onset 11/28  -WBC normal, afebrile with mild symptoms   -Deferred remdesevir as patient is on room air, minimally symptomatic, has one kidney and known CKD. Deferred dexamethasone as pt on room air.  -Continue supportive care with scheduled Mucinex, prn tessalon perles, prn Robitussin    Weakness, fall at home  -CT head negative at [facility]  -PT and OT following, assisting with evaluation.  -CM consulted, pt from assisted living    Elevated troponin  -troponin 32 down to 27 on admission, likely related to CKD/supply-demand mismatch  -pt denies chest pain  -EKG with normal sinus rhythm and right bundle branch block.     Elevated creatinine  CKD III  H/o right nephrectomy  -baseline ~1.45-1.5  -creatinine 1.7 on 12/1  - improved to 1.69 this am  -Continue renally dosed allopurinol, Lovenox for dvt ppx  -Monitor CMP daily.     Thrombocytopenia  -on chemotherapy, drop from 148 on 11/1 >> 88 on admission  -Lovenox for DVT ppx, CrCl acceptable    Elevated LFTs  Elevated alk phos  -Likely secondary to chemotherapy  -Continue to monitor daily CMP.     Prostate cancer with bone metastasis  Hx of PEs  -Follows with Dr. Ramon  -holding home chemo medication given we do not have on formulary.   -previously anticoagulated with Eliquis  -Lovenox for DVT ppx given h/o  cancer     Chronic HFmrEF  Atrial fibrillation  H/o AVR  -last echo 10/2022 with EF 45-50%, mild concentric hypertrophy  -continue Entresto, Jardiance, metoprolol  -continue amiodarone for atrial fibrillation  -free T4 elevated 2.06 but TSH wnl, recommend f/u with endocrinology as pt has known h/o a-fib     Prolonged QTc  -QTc 526 on admission  -patient on amiodarone, has RBB at baseline  -avoid additional QT-prolonging meds, dexamethasone prn for nausea     Left shin wound  Skin abrasions LLE and left elbow  -continue Bactrim and clindamycin  -added probiotic  -WOC evaluated, recommended to continue dressing changes every 3 days using Xeroform/silicone foam dressing.     HTN  HLD  -Continue metoprolol, statin     IDDM2  -A1c 8.8%  -follows with endocrinology, on Trulicity 1.5 mg weekly, Lantus 50 units daily, and Jardiance 25 mg daily at home  -started basal bolus regimen with Levemir 20 units daily plus SSI  -continue Jardiance     HUSSAIN  -CPAP    Expected Discharge Location and Transportation: rehab   Expected Discharge   Expected Discharge Date: 12/4/2023; Expected Discharge Time:      DVT prophylaxis:  Medical DVT prophylaxis orders are present.     AM-PAC 6 Clicks Score (PT): 19 (12/01/23 2000)    CODE STATUS:   Code Status and Medical Interventions:   Ordered at: 11/29/23 1641     Medical Intervention Limits:    NO intubation (DNI)    NO artificial nutrition     Code Status (Patient has no pulse and is not breathing):    CPR (Attempt to Resuscitate)     Medical Interventions (Patient has pulse or is breathing):    Limited Support       Jane Alas DO  12/02/23

## 2023-12-03 LAB
ALBUMIN SERPL-MCNC: 3.3 G/DL (ref 3.5–5.2)
ALBUMIN/GLOB SERPL: 1.1 G/DL
ALP SERPL-CCNC: 208 U/L (ref 39–117)
ALT SERPL W P-5'-P-CCNC: 25 U/L (ref 1–41)
ANION GAP SERPL CALCULATED.3IONS-SCNC: 13 MMOL/L (ref 5–15)
AST SERPL-CCNC: 39 U/L (ref 1–40)
BILIRUB SERPL-MCNC: 0.6 MG/DL (ref 0–1.2)
BUN SERPL-MCNC: 36 MG/DL (ref 8–23)
BUN/CREAT SERPL: 24 (ref 7–25)
CALCIUM SPEC-SCNC: 8.1 MG/DL (ref 8.6–10.5)
CHLORIDE SERPL-SCNC: 109 MMOL/L (ref 98–107)
CO2 SERPL-SCNC: 16 MMOL/L (ref 22–29)
CREAT SERPL-MCNC: 1.5 MG/DL (ref 0.76–1.27)
DEPRECATED RDW RBC AUTO: 53.8 FL (ref 37–54)
EGFRCR SERPLBLD CKD-EPI 2021: 45.1 ML/MIN/1.73
ERYTHROCYTE [DISTWIDTH] IN BLOOD BY AUTOMATED COUNT: 15.6 % (ref 12.3–15.4)
GLOBULIN UR ELPH-MCNC: 2.9 GM/DL
GLUCOSE BLDC GLUCOMTR-MCNC: 131 MG/DL (ref 70–130)
GLUCOSE BLDC GLUCOMTR-MCNC: 138 MG/DL (ref 70–130)
GLUCOSE BLDC GLUCOMTR-MCNC: 150 MG/DL (ref 70–130)
GLUCOSE BLDC GLUCOMTR-MCNC: 158 MG/DL (ref 70–130)
GLUCOSE BLDC GLUCOMTR-MCNC: 160 MG/DL (ref 70–130)
GLUCOSE BLDC GLUCOMTR-MCNC: 175 MG/DL (ref 70–130)
GLUCOSE BLDC GLUCOMTR-MCNC: 81 MG/DL (ref 70–130)
GLUCOSE BLDC GLUCOMTR-MCNC: 91 MG/DL (ref 70–130)
GLUCOSE BLDC GLUCOMTR-MCNC: 93 MG/DL (ref 70–130)
GLUCOSE SERPL-MCNC: 108 MG/DL (ref 65–99)
HCT VFR BLD AUTO: 41.1 % (ref 37.5–51)
HGB BLD-MCNC: 13.2 G/DL (ref 13–17.7)
MCH RBC QN AUTO: 30.1 PG (ref 26.6–33)
MCHC RBC AUTO-ENTMCNC: 32.1 G/DL (ref 31.5–35.7)
MCV RBC AUTO: 93.6 FL (ref 79–97)
PLATELET # BLD AUTO: 101 10*3/MM3 (ref 140–450)
PMV BLD AUTO: 10.1 FL (ref 6–12)
POTASSIUM SERPL-SCNC: 4.6 MMOL/L (ref 3.5–5.2)
PROT SERPL-MCNC: 6.2 G/DL (ref 6–8.5)
RBC # BLD AUTO: 4.39 10*6/MM3 (ref 4.14–5.8)
SODIUM SERPL-SCNC: 138 MMOL/L (ref 136–145)
WBC NRBC COR # BLD AUTO: 6.73 10*3/MM3 (ref 3.4–10.8)

## 2023-12-03 PROCEDURE — 85027 COMPLETE CBC AUTOMATED: CPT | Performed by: HOSPITALIST

## 2023-12-03 PROCEDURE — 63710000001 INSULIN DETEMIR PER 5 UNITS: Performed by: NURSE PRACTITIONER

## 2023-12-03 PROCEDURE — 82948 REAGENT STRIP/BLOOD GLUCOSE: CPT

## 2023-12-03 PROCEDURE — 25010000002 ENOXAPARIN PER 10 MG: Performed by: NURSE PRACTITIONER

## 2023-12-03 PROCEDURE — 99232 SBSQ HOSP IP/OBS MODERATE 35: CPT | Performed by: NURSE PRACTITIONER

## 2023-12-03 PROCEDURE — G0378 HOSPITAL OBSERVATION PER HR: HCPCS

## 2023-12-03 PROCEDURE — 80053 COMPREHEN METABOLIC PANEL: CPT | Performed by: STUDENT IN AN ORGANIZED HEALTH CARE EDUCATION/TRAINING PROGRAM

## 2023-12-03 PROCEDURE — 94799 UNLISTED PULMONARY SVC/PX: CPT

## 2023-12-03 PROCEDURE — 96372 THER/PROPH/DIAG INJ SC/IM: CPT

## 2023-12-03 PROCEDURE — 94660 CPAP INITIATION&MGMT: CPT

## 2023-12-03 PROCEDURE — 63710000001 INSULIN LISPRO (HUMAN) PER 5 UNITS: Performed by: NURSE PRACTITIONER

## 2023-12-03 RX ADMIN — Medication 10 ML: at 08:36

## 2023-12-03 RX ADMIN — PRAVASTATIN SODIUM 40 MG: 40 TABLET ORAL at 21:10

## 2023-12-03 RX ADMIN — ENOXAPARIN SODIUM 40 MG: 40 INJECTION SUBCUTANEOUS at 08:32

## 2023-12-03 RX ADMIN — INSULIN LISPRO 2 UNITS: 100 INJECTION, SOLUTION INTRAVENOUS; SUBCUTANEOUS at 13:02

## 2023-12-03 RX ADMIN — SACUBITRIL AND VALSARTAN 1 TABLET: 24; 26 TABLET, FILM COATED ORAL at 08:33

## 2023-12-03 RX ADMIN — INSULIN DETEMIR 20 UNITS: 100 INJECTION, SOLUTION SUBCUTANEOUS at 08:32

## 2023-12-03 RX ADMIN — Medication 250 MG: at 21:11

## 2023-12-03 RX ADMIN — Medication 250 MG: at 08:33

## 2023-12-03 RX ADMIN — CLINDAMYCIN HYDROCHLORIDE 300 MG: 150 CAPSULE ORAL at 21:10

## 2023-12-03 RX ADMIN — EMPAGLIFLOZIN 25 MG: 25 TABLET, FILM COATED ORAL at 08:35

## 2023-12-03 RX ADMIN — CLINDAMYCIN HYDROCHLORIDE 300 MG: 150 CAPSULE ORAL at 05:40

## 2023-12-03 RX ADMIN — AMIODARONE HYDROCHLORIDE 200 MG: 200 TABLET ORAL at 08:35

## 2023-12-03 RX ADMIN — METOPROLOL SUCCINATE 12.5 MG: 25 TABLET, EXTENDED RELEASE ORAL at 08:34

## 2023-12-03 RX ADMIN — SENNOSIDES AND DOCUSATE SODIUM 2 TABLET: 8.6; 5 TABLET ORAL at 21:10

## 2023-12-03 RX ADMIN — ALLOPURINOL 100 MG: 100 TABLET ORAL at 08:35

## 2023-12-03 RX ADMIN — BENZONATATE 100 MG: 100 CAPSULE ORAL at 08:35

## 2023-12-03 RX ADMIN — Medication 10 ML: at 21:22

## 2023-12-03 RX ADMIN — SACUBITRIL AND VALSARTAN 1 TABLET: 24; 26 TABLET, FILM COATED ORAL at 21:10

## 2023-12-03 RX ADMIN — CLINDAMYCIN HYDROCHLORIDE 300 MG: 150 CAPSULE ORAL at 13:02

## 2023-12-03 NOTE — PLAN OF CARE
Goal Outcome Evaluation:  Plan of Care Reviewed With: patient        Progress: no change  Outcome Evaluation: vss, SR on tele, poor appetite, sleeping several hours throughout the day. denies pain

## 2023-12-03 NOTE — PROGRESS NOTES
[facility] Medicine Services  PROGRESS NOTE    Patient Name: Blane Dietz  : 1935  MRN: [id number]    Date of Admission: 2023  Primary Care Physician: David Em MD    Subjective   Subjective     CC:  F/u generalized weakness, frequent falls    HPI:  Patient resting in bed.  Says he is feeling better.  Denies shortness of breath.  Denies other concerns.  Feels like he needs to have a BM today.  Is waiting on rehab.      Objective   Objective     Vital Signs:   Temp:  [97.3 °F (36.3 °C)-98.6 °F (37 °C)] 97.3 °F (36.3 °C)  Heart Rate:  [70-92] 70  Resp:  [16-18] 16  BP: ()/(58-85) 116/70     Physical Exam:  Constitutional: No acute distress, awake, alert  HENT: NCAT, mucous membranes moist  Respiratory: Clear to auscultation x diminished at bases bilaterally, respiratory effort normal, room air  Cardiovascular: RRR, no murmurs, rubs, or gallops  Gastrointestinal: Positive bowel sounds, soft, nontender, nondistended  Musculoskeletal: No bilateral ankle edema  Psychiatric: Appropriate affect, cooperative  Neurologic: Oriented x 3, moves all extremities, Cranial Nerves grossly intact to confrontation, speech clear  Skin: No rashes, Mepilex dressing LLE CDI      Results Reviewed:  LAB RESULTS:      Lab 23  0444 23  0356 23  0319 23  1748 23  1357 23  1929   WBC 6.73 8.10 8.03 8.48 9.42 9.80   HEMOGLOBIN 13.2 12.9* 13.6 11.8* 14.3 13.8   HEMATOCRIT 41.1 39.9 41.0 37.1* 44.7 42.0   PLATELETS 101* 90* 85* 67* 88* 84*   NEUTROS ABS  --  4.13 5.94 5.79 7.54* 7.31*  7.55*   IMMATURE GRANS (ABS)  --   --   --  0.63*  --  0.61*   LYMPHS ABS  --   --   --  0.93  --  0.45*   MONOS ABS  --   --   --  0.89  --  1.00*   EOS ABS  --  0.32 0.32 0.10 0.09 0.19  0.10   MCV 93.6 95.2 91.3 96.6 94.1 92.3   CRP  --   --   --   --  2.91*  --          Lab 23  0444 23  0329 23  1025 23  0722 23  0319 23  1357   SODIUM  138 135*  --  136 132* 131*   POTASSIUM 4.6 4.8  --  4.6 4.4 5.2   CHLORIDE 109* 107  --  108* 102 100   CO2 16.0* 17.0*  --  16.0* 20.0* 19.0*   ANION GAP 13.0 11.0  --  12.0 10.0 12.0   BUN 36* 39*  --  41* 30* 28*   CREATININE 1.50* 1.69*  --  1.71* 1.52* 1.64*   EGFR 45.1* 39.1*  --  38.5* 44.3* 40.5*   GLUCOSE 108* 93  --  97 93 202*   CALCIUM 8.1* 8.0*  --  7.7* 8.0* 8.3*   IONIZED CALCIUM  --   --  1.14  --   --   --    MAGNESIUM  --   --   --   --   --  2.5*   TSH  --   --   --   --   --  1.470         Lab 12/03/23  0444 12/02/23  0329 12/01/23  0722 11/30/23  0319 11/29/23  1357   TOTAL PROTEIN 6.2 6.1 5.7* 6.1 7.0   ALBUMIN 3.3* 3.2* 3.5 3.3* 3.9   GLOBULIN 2.9 2.9 2.2 2.8 3.1   ALT (SGPT) 25 24 28 32 40   AST (SGOT) 39 40 45* 49* 55*   BILIRUBIN 0.6 0.7 0.8 0.9 1.3*   ALK PHOS 208* 192* 195* 187* 219*         Lab 11/29/23  1748 11/29/23  1357 11/28/23  1929   HSTROP T 27* 32* 25*             Lab 11/29/23  1357   FERRITIN 290.50         Brief Urine Lab Results  (Last result in the past 365 days)        Color   Clarity   Blood   Leuk Est   Nitrite   Protein   CREAT   Urine HCG        11/30/23 1620 Yellow   Clear   Negative   Negative   Negative   Negative                   Microbiology Results Abnormal       None            No radiology results from the last 24 hrs    Results for orders placed during the hospital encounter of 10/25/22    Adult Transthoracic Echo Complete With Contrast if Necessary Per Protocol    Interpretation Summary    Left ventricular wall thickness is consistent with mild concentric hypertrophy.    The right ventricular cavity is dilated.    Left atrial volume is moderately increased.    There is a bioprosthetic aortic valve present.    Estimated right ventricular systolic pressure from tricuspid regurgitation is mildly elevated (35-45 mmHg). Calculated right ventricular systolic pressure from tricuspid regurgitation is 36 mmHg.    There is a prosthetic pulmonic valve present.    There  is a large left pleural effusion.    Mild global LV hypokinesis    Ejection fraction estimated at 45 to 50%    Mild to moderate AI    Mild MR      Current medications:  Scheduled Meds:allopurinol, 100 mg, Oral, Daily  amiodarone, 200 mg, Oral, Daily  clindamycin, 300 mg, Oral, Q8H  empagliflozin, 25 mg, Oral, Daily  enoxaparin, 40 mg, Subcutaneous, Daily  insulin detemir, 20 Units, Subcutaneous, Daily  insulin lispro, 2-9 Units, Subcutaneous, 4x Daily AC & at Bedtime  metoprolol succinate XL, 12.5 mg, Oral, Daily  pravastatin, 40 mg, Oral, Nightly  saccharomyces boulardii, 250 mg, Oral, BID  sacubitril-valsartan, 1 tablet, Oral, Q12H  senna-docusate sodium, 2 tablet, Oral, BID  sodium chloride, 10 mL, Intravenous, Q12H  Triple Antibiotic, 1 application , Apply externally, Once      Continuous Infusions:   PRN Meds:.  acetaminophen    aluminum-magnesium hydroxide-simethicone    benzocaine-menthol    benzonatate    senna-docusate sodium **AND** polyethylene glycol **AND** bisacodyl **AND** bisacodyl    calcium carbonate    dexAMETHasone    dextrose    dextrose    fluticasone    glucagon (human recombinant)    guaiFENesin-dextromethorphan    nitroglycerin    sodium chloride    sodium chloride    sodium chloride    Assessment & Plan   Assessment & Plan     Active Hospital Problems    Diagnosis  POA    **Upper respiratory tract infection due to COVID-19 virus [U07.1, J06.9]  Yes    Pneumonia due to COVID-19 virus [U07.1, J12.82]  Yes    Stage 3b chronic kidney disease [N18.32]  Yes    HLD (hyperlipidemia) [E78.5]  Yes    Sleep apnea [G47.30]  Yes    Type 2 diabetes mellitus with hyperglycemia, with long-term current use of insulin [E11.65, Z79.4]  Not Applicable    Essential hypertension [I10]  Yes      Resolved Hospital Problems   No resolved problems to display.        Brief Hospital Course to date:  Blane Dietz is a 88 y.o. 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 Ramon for continued weakness. Patient was seen in [facility] ED 11/28 for weakness and a fall at home. On 11/29, his daughter called Dr. Ramon's office after patient slid out of bed trying to stand today at his assisted-living facility and had to have EMS get him off the floor. Patient presented to [facility] ED and was found to be positive for COVID-19.      This patient's problems and plans were partially entered by my partner and updated as appropriate by me 12/03/23.       COVID-19 infection  -Dx on 11/29, symptom onset 11/28  -WBC normal, afebrile with mild symptoms   -Deferred remdesevir as patient is on room air, minimally symptomatic, has one kidney and known CKD. Deferred dexamethasone as pt on room air.  -Continue supportive care with scheduled Mucinex, prn tessalon perles, prn Robitussin  -Incentive spirometry     Weakness, fall at home  -CT head negative at [facility]  -PT and OT following, assisting with evaluation  -CM consulted, pt from assisted living     Elevated troponin  -troponin 32 down to 27 on admission, likely related to CKD/supply-demand mismatch  -pt denies chest pain  -EKG with normal sinus rhythm and right bundle branch block.     Elevated creatinine  CKD III  H/o right nephrectomy  -baseline ~1.45-1.5  -creatinine 1.7 on 12/1  -improved to 1.5 today, 12/3  -Continue renally dosed allopurinol, Lovenox for dvt ppx  -Monitor CMP daily.     Thrombocytopenia  -on chemotherapy, drop from 148 on 11/1 >> 88 on admission  -Lovenox for DVT ppx, CrCl acceptable  -plts improving, 101 today, 12/3     Elevated LFTs  Elevated alk phos  -Likely secondary to chemotherapy  -Continue to monitor daily CMP     Prostate cancer with bone metastasis  Hx of PEs  -Follows with Dr. Ramon  -holding home chemo medication given we do not  have on formulary.   -previously anticoagulated with Eliquis  -Lovenox for DVT ppx given h/o cancer     Chronic HFmrEF  Atrial fibrillation  H/o AVR  -last echo 10/2022 with EF 45-50%, mild concentric hypertrophy  -continue Entresto, Jardiance, metoprolol  -continue amiodarone for atrial fibrillation  -free T4 elevated 2.06 but TSH wnl, recommend f/u with endocrinology as pt has known h/o a-fib     Prolonged QTc  -QTc 526 on admission  -patient on amiodarone, has RBB at baseline  -avoid additional QT-prolonging meds, dexamethasone prn for nausea     Left shin wound  Skin abrasions LLE and left elbow  -continue clindamycin, completed Bactrim  -added probiotic  -WOC evaluated, recommended to continue dressing changes every 3 days using Xeroform/silicone foam dressing.     HTN  HLD  -Continue metoprolol, statin     IDDM2  -A1c 8.8%  -follows with endocrinology, on Trulicity 1.5 mg weekly, Lantus 50 units daily, and Jardiance 25 mg daily at home  -started basal bolus regimen with Levemir 20 units daily plus SSI  -continue Jardiance  -BGs stable     HUSSAIN  -CPAP    Expected Discharge Location and Transportation: SNF rehab  Expected Discharge pending bed offer, insurance approval  Expected Discharge Date: 12/4/2023; Expected Discharge Time:      DVT prophylaxis:  Medical DVT prophylaxis orders are present.     AM-PAC 6 Clicks Score (PT): 18 (12/02/23 2000)    CODE STATUS:   Code Status and Medical Interventions:   Ordered at: 11/29/23 1641     Medical Intervention Limits:    NO intubation (DNI)    NO artificial nutrition     Code Status (Patient has no pulse and is not breathing):    CPR (Attempt to Resuscitate)     Medical Interventions (Patient has pulse or is breathing):    Limited Support       Sasha Eduardo, APRN  12/03/23

## 2023-12-03 NOTE — PLAN OF CARE
Goal Outcome Evaluation:      Plan of care reviewed with patient at bedside. VSS, NSR on tele, up in chair most of the day, no complaints of shortness of air.

## 2023-12-04 ENCOUNTER — DOCUMENTATION (OUTPATIENT)
Dept: HOME HEALTH SERVICES | Facility: HOME HEALTHCARE | Age: 88
End: 2023-12-04
Payer: MEDICARE

## 2023-12-04 ENCOUNTER — READMISSION MANAGEMENT (OUTPATIENT)
Dept: CALL CENTER | Facility: HOSPITAL | Age: 88
End: 2023-12-04
Payer: MEDICARE

## 2023-12-04 ENCOUNTER — TELEPHONE (OUTPATIENT)
Dept: INTERNAL MEDICINE | Facility: CLINIC | Age: 88
End: 2023-12-04
Payer: MEDICARE

## 2023-12-04 VITALS
OXYGEN SATURATION: 98 % | TEMPERATURE: 97.5 F | HEIGHT: 70 IN | SYSTOLIC BLOOD PRESSURE: 118 MMHG | WEIGHT: 190 LBS | BODY MASS INDEX: 27.2 KG/M2 | RESPIRATION RATE: 18 BRPM | DIASTOLIC BLOOD PRESSURE: 68 MMHG | HEART RATE: 79 BPM

## 2023-12-04 PROBLEM — J06.9 UPPER RESPIRATORY TRACT INFECTION DUE TO COVID-19 VIRUS: Status: RESOLVED | Noted: 2023-11-29 | Resolved: 2023-12-04

## 2023-12-04 PROBLEM — U07.1 PNEUMONIA DUE TO COVID-19 VIRUS: Status: RESOLVED | Noted: 2023-11-30 | Resolved: 2023-12-04

## 2023-12-04 PROBLEM — J12.82 PNEUMONIA DUE TO COVID-19 VIRUS: Status: RESOLVED | Noted: 2023-11-30 | Resolved: 2023-12-04

## 2023-12-04 PROBLEM — U07.1 UPPER RESPIRATORY TRACT INFECTION DUE TO COVID-19 VIRUS: Status: RESOLVED | Noted: 2023-11-29 | Resolved: 2023-12-04

## 2023-12-04 LAB
ALBUMIN SERPL-MCNC: 3.6 G/DL (ref 3.5–5.2)
ALBUMIN/GLOB SERPL: 1.7 G/DL
ALP SERPL-CCNC: 209 U/L (ref 39–117)
ALT SERPL W P-5'-P-CCNC: 24 U/L (ref 1–41)
ANION GAP SERPL CALCULATED.3IONS-SCNC: 9 MMOL/L (ref 5–15)
AST SERPL-CCNC: 38 U/L (ref 1–40)
BILIRUB SERPL-MCNC: 0.6 MG/DL (ref 0–1.2)
BUN SERPL-MCNC: 36 MG/DL (ref 8–23)
BUN/CREAT SERPL: 27.3 (ref 7–25)
CALCIUM SPEC-SCNC: 8.2 MG/DL (ref 8.6–10.5)
CHLORIDE SERPL-SCNC: 111 MMOL/L (ref 98–107)
CO2 SERPL-SCNC: 18 MMOL/L (ref 22–29)
CREAT SERPL-MCNC: 1.32 MG/DL (ref 0.76–1.27)
EGFRCR SERPLBLD CKD-EPI 2021: 52.5 ML/MIN/1.73
GLOBULIN UR ELPH-MCNC: 2.1 GM/DL
GLUCOSE BLDC GLUCOMTR-MCNC: 154 MG/DL (ref 70–130)
GLUCOSE BLDC GLUCOMTR-MCNC: 180 MG/DL (ref 70–130)
GLUCOSE SERPL-MCNC: 125 MG/DL (ref 65–99)
POTASSIUM SERPL-SCNC: 5.2 MMOL/L (ref 3.5–5.2)
PROT SERPL-MCNC: 5.7 G/DL (ref 6–8.5)
SODIUM SERPL-SCNC: 138 MMOL/L (ref 136–145)

## 2023-12-04 PROCEDURE — 80053 COMPREHEN METABOLIC PANEL: CPT | Performed by: STUDENT IN AN ORGANIZED HEALTH CARE EDUCATION/TRAINING PROGRAM

## 2023-12-04 PROCEDURE — 63710000001 INSULIN DETEMIR PER 5 UNITS: Performed by: NURSE PRACTITIONER

## 2023-12-04 PROCEDURE — 82948 REAGENT STRIP/BLOOD GLUCOSE: CPT

## 2023-12-04 PROCEDURE — 25010000002 ENOXAPARIN PER 10 MG: Performed by: NURSE PRACTITIONER

## 2023-12-04 PROCEDURE — G0378 HOSPITAL OBSERVATION PER HR: HCPCS

## 2023-12-04 PROCEDURE — 97116 GAIT TRAINING THERAPY: CPT

## 2023-12-04 PROCEDURE — 96372 THER/PROPH/DIAG INJ SC/IM: CPT

## 2023-12-04 PROCEDURE — 99239 HOSP IP/OBS DSCHRG MGMT >30: CPT | Performed by: NURSE PRACTITIONER

## 2023-12-04 PROCEDURE — 94799 UNLISTED PULMONARY SVC/PX: CPT

## 2023-12-04 PROCEDURE — 97530 THERAPEUTIC ACTIVITIES: CPT

## 2023-12-04 PROCEDURE — 63710000001 INSULIN LISPRO (HUMAN) PER 5 UNITS: Performed by: NURSE PRACTITIONER

## 2023-12-04 RX ORDER — GUAIFENESIN/DEXTROMETHORPHAN 100-10MG/5
5 SYRUP ORAL EVERY 4 HOURS PRN
Start: 2023-12-04 | End: 2023-12-09

## 2023-12-04 RX ORDER — BENZONATATE 100 MG/1
100 CAPSULE ORAL 3 TIMES DAILY PRN
Qty: 30 CAPSULE | Refills: 0 | Status: SHIPPED | OUTPATIENT
Start: 2023-12-04

## 2023-12-04 RX ORDER — CLINDAMYCIN HYDROCHLORIDE 300 MG/1
300 CAPSULE ORAL EVERY 8 HOURS SCHEDULED
Qty: 3 CAPSULE | Refills: 0 | Status: SHIPPED | OUTPATIENT
Start: 2023-12-04 | End: 2023-12-05

## 2023-12-04 RX ORDER — SODIUM CHLOR/HYPOCHLOROUS ACID 0.033 %
1 SOLUTION, IRRIGATION IRRIGATION
Qty: 1000 ML | Refills: 0 | Status: SHIPPED | OUTPATIENT
Start: 2023-12-04 | End: 2023-12-14

## 2023-12-04 RX ORDER — SACCHAROMYCES BOULARDII 250 MG
250 CAPSULE ORAL 2 TIMES DAILY
Qty: 28 CAPSULE | Refills: 0 | Status: SHIPPED | OUTPATIENT
Start: 2023-12-04 | End: 2023-12-18

## 2023-12-04 RX ADMIN — Medication 250 MG: at 08:15

## 2023-12-04 RX ADMIN — ALLOPURINOL 100 MG: 100 TABLET ORAL at 08:15

## 2023-12-04 RX ADMIN — CLINDAMYCIN HYDROCHLORIDE 300 MG: 150 CAPSULE ORAL at 13:34

## 2023-12-04 RX ADMIN — AMIODARONE HYDROCHLORIDE 200 MG: 200 TABLET ORAL at 08:15

## 2023-12-04 RX ADMIN — SENNOSIDES AND DOCUSATE SODIUM 2 TABLET: 8.6; 5 TABLET ORAL at 08:16

## 2023-12-04 RX ADMIN — CLINDAMYCIN HYDROCHLORIDE 300 MG: 150 CAPSULE ORAL at 05:01

## 2023-12-04 RX ADMIN — INSULIN LISPRO 2 UNITS: 100 INJECTION, SOLUTION INTRAVENOUS; SUBCUTANEOUS at 13:35

## 2023-12-04 RX ADMIN — INSULIN LISPRO 2 UNITS: 100 INJECTION, SOLUTION INTRAVENOUS; SUBCUTANEOUS at 08:16

## 2023-12-04 RX ADMIN — INSULIN DETEMIR 20 UNITS: 100 INJECTION, SOLUTION SUBCUTANEOUS at 08:16

## 2023-12-04 RX ADMIN — EMPAGLIFLOZIN 25 MG: 25 TABLET, FILM COATED ORAL at 08:15

## 2023-12-04 RX ADMIN — SACUBITRIL AND VALSARTAN 1 TABLET: 24; 26 TABLET, FILM COATED ORAL at 08:15

## 2023-12-04 RX ADMIN — ENOXAPARIN SODIUM 40 MG: 40 INJECTION SUBCUTANEOUS at 08:14

## 2023-12-04 NOTE — PROGRESS NOTES
[facility] Medicine Services  PROGRESS NOTE    Patient Name: Blane Dietz  : 1935  MRN: [id number]    Date of Admission: 2023  Primary Care Physician: David Em MD    Subjective   Subjective     CC:  F/u generalized weakness, frequent falls    HPI:   Patient resting in bed. Says he feels ready to go to rehab today. Has a bed at [facility] and his waiting on insurance approval. Denies concerns.    Objective   Objective     Vital Signs:   Temp:  [97.5 °F (36.4 °C)-98.1 °F (36.7 °C)] 97.5 °F (36.4 °C)  Heart Rate:  [75-94] 83  Resp:  [16-19] 18  BP: (100-172)/(65-78) 100/71     Physical Exam:   Constitutional: No acute distress, awake, alert  HENT: NCAT, mucous membranes moist  Respiratory: Clear to auscultation bilaterally, respiratory effort normal, room air  Cardiovascular: RRR, no murmurs, rubs, or gallops  Gastrointestinal: Positive bowel sounds, soft, nontender, nondistended  Musculoskeletal: No bilateral ankle edema  Psychiatric: Appropriate affect, cooperative  Neurologic: Oriented x 3, moves all extremities, Cranial Nerves grossly intact to confrontation, speech clear  Skin: No rashes, Mepilex dressing LLE CDI      Results Reviewed:  LAB RESULTS:      Lab 23  0444 23  0356 23  0319 23  1748 23  1357 23  1929   WBC 6.73 8.10 8.03 8.48 9.42 9.80   HEMOGLOBIN 13.2 12.9* 13.6 11.8* 14.3 13.8   HEMATOCRIT 41.1 39.9 41.0 37.1* 44.7 42.0   PLATELETS 101* 90* 85* 67* 88* 84*   NEUTROS ABS  --  4.13 5.94 5.79 7.54* 7.31*  7.55*   IMMATURE GRANS (ABS)  --   --   --  0.63*  --  0.61*   LYMPHS ABS  --   --   --  0.93  --  0.45*   MONOS ABS  --   --   --  0.89  --  1.00*   EOS ABS  --  0.32 0.32 0.10 0.09 0.19  0.10   MCV 93.6 95.2 91.3 96.6 94.1 92.3   CRP  --   --   --   --  2.91*  --          Lab 23  0718 23  0444 23  0329 23  1025 23  0722 23  0319 23  1357   SODIUM 138 138 135*  --  136 132*  131*   POTASSIUM 5.2 4.6 4.8  --  4.6 4.4 5.2   CHLORIDE 111* 109* 107  --  108* 102 100   CO2 18.0* 16.0* 17.0*  --  16.0* 20.0* 19.0*   ANION GAP 9.0 13.0 11.0  --  12.0 10.0 12.0   BUN 36* 36* 39*  --  41* 30* 28*   CREATININE 1.32* 1.50* 1.69*  --  1.71* 1.52* 1.64*   EGFR 52.5* 45.1* 39.1*  --  38.5* 44.3* 40.5*   GLUCOSE 125* 108* 93  --  97 93 202*   CALCIUM 8.2* 8.1* 8.0*  --  7.7* 8.0* 8.3*   IONIZED CALCIUM  --   --   --  1.14  --   --   --    MAGNESIUM  --   --   --   --   --   --  2.5*   TSH  --   --   --   --   --   --  1.470         Lab 12/04/23  0718 12/03/23  0444 12/02/23  0329 12/01/23  0722 11/30/23  0319   TOTAL PROTEIN 5.7* 6.2 6.1 5.7* 6.1   ALBUMIN 3.6 3.3* 3.2* 3.5 3.3*   GLOBULIN 2.1 2.9 2.9 2.2 2.8   ALT (SGPT) 24 25 24 28 32   AST (SGOT) 38 39 40 45* 49*   BILIRUBIN 0.6 0.6 0.7 0.8 0.9   ALK PHOS 209* 208* 192* 195* 187*         Lab 11/29/23  1748 11/29/23  1357 11/28/23  1929   HSTROP T 27* 32* 25*             Lab 11/29/23  1357   FERRITIN 290.50         Brief Urine Lab Results  (Last result in the past 365 days)        Color   Clarity   Blood   Leuk Est   Nitrite   Protein   CREAT   Urine HCG        11/30/23 1620 Yellow   Clear   Negative   Negative   Negative   Negative                   Microbiology Results Abnormal       None            No radiology results from the last 24 hrs    Results for orders placed during the hospital encounter of 10/25/22    Adult Transthoracic Echo Complete With Contrast if Necessary Per Protocol    Interpretation Summary    Left ventricular wall thickness is consistent with mild concentric hypertrophy.    The right ventricular cavity is dilated.    Left atrial volume is moderately increased.    There is a bioprosthetic aortic valve present.    Estimated right ventricular systolic pressure from tricuspid regurgitation is mildly elevated (35-45 mmHg). Calculated right ventricular systolic pressure from tricuspid regurgitation is 36 mmHg.    There is a  prosthetic pulmonic valve present.    There is a large left pleural effusion.    Mild global LV hypokinesis    Ejection fraction estimated at 45 to 50%    Mild to moderate AI    Mild MR      Current medications:  Scheduled Meds:allopurinol, 100 mg, Oral, Daily  amiodarone, 200 mg, Oral, Daily  clindamycin, 300 mg, Oral, Q8H  empagliflozin, 25 mg, Oral, Daily  enoxaparin, 40 mg, Subcutaneous, Daily  insulin detemir, 20 Units, Subcutaneous, Daily  insulin lispro, 2-9 Units, Subcutaneous, 4x Daily AC & at Bedtime  metoprolol succinate XL, 12.5 mg, Oral, Daily  pravastatin, 40 mg, Oral, Nightly  saccharomyces boulardii, 250 mg, Oral, BID  sacubitril-valsartan, 1 tablet, Oral, Q12H  senna-docusate sodium, 2 tablet, Oral, BID  sodium chloride, 10 mL, Intravenous, Q12H  Triple Antibiotic, 1 application , Apply externally, Once      Continuous Infusions:   PRN Meds:.  acetaminophen    aluminum-magnesium hydroxide-simethicone    benzocaine-menthol    benzonatate    senna-docusate sodium **AND** polyethylene glycol **AND** bisacodyl **AND** bisacodyl    calcium carbonate    dexAMETHasone    dextrose    dextrose    fluticasone    glucagon (human recombinant)    guaiFENesin-dextromethorphan    nitroglycerin    sodium chloride    sodium chloride    sodium chloride    Assessment & Plan   Assessment & Plan     Active Hospital Problems    Diagnosis  POA    **Upper respiratory tract infection due to COVID-19 virus [U07.1, J06.9]  Yes    Pneumonia due to COVID-19 virus [U07.1, J12.82]  Yes    Stage 3b chronic kidney disease [N18.32]  Yes    HLD (hyperlipidemia) [E78.5]  Yes    Sleep apnea [G47.30]  Yes    Type 2 diabetes mellitus with hyperglycemia, with long-term current use of insulin [E11.65, Z79.4]  Not Applicable    Essential hypertension [I10]  Yes      Resolved Hospital Problems   No resolved problems to display.        Brief Hospital Course to date:  Blane Dietz is a 88 y.o. male with PMHx significant prostate cancer with  bone metastasis (dx Sept 2023), diffuse large B-cell lymphoma right kidney s/p nephrectomy, h/o PEs previously anticoagulated on Eliquis, a-fib, CHF, CKD III, HTN, HLD, DM2 who presented to [facility] ED at the recommendation of Dr. Ramon for continued weakness. Patient was seen in [facility] ED 11/28 for weakness and a fall at home. On 11/29, his daughter called Dr. Ramon's office after patient slid out of bed trying to stand today at his assisted-living facility and had to have EMS get him off the floor. Patient presented to [facility] ED and was found to be positive for COVID-19.      This patient's problems and plans were partially entered by my partner and updated as appropriate by me 12/04/23.       COVID-19 infection  -Dx on 11/29, symptom onset 11/28  -WBC normal, afebrile with mild symptoms   -Deferred remdesevir as patient is on room air, minimally symptomatic, has one kidney and known CKD. Deferred dexamethasone as pt on room air.  -Continue supportive care with scheduled Mucinex, prn tessalon perles, prn Robitussin  -Incentive spirometry     Weakness, fall at home  -CT head negative at [facility]  -PT and OT following, assisting with evaluation  -CM consulted, pt from assisted living     Elevated troponin  -troponin 32 down to 27 on admission, likely related to CKD/supply-demand mismatch  -pt denies chest pain  -EKG with normal sinus rhythm and right bundle branch block.     Elevated creatinine  CKD III  H/o right nephrectomy  -baseline ~1.45-1.5  -creatinine 1.7 on 12/1  -improved to 1.32 today, 12/4  -Continue renally dosed allopurinol, Lovenox for dvt ppx  -Monitor CMP daily.     Thrombocytopenia  -on chemotherapy, drop from 148 on 11/1 >> 88 on admission  -Lovenox for DVT ppx, CrCl acceptable  -plts improving, 101 on 12/3     Elevated LFTs  Elevated alk phos  -Likely secondary to chemotherapy  -Continue to monitor daily CMP     Prostate cancer with bone metastasis  Hx of PEs  -Follows with Dr. Ramon  -holding  home chemo medication given we do not have on formulary.   -previously anticoagulated with Eliquis  -Lovenox for DVT ppx given h/o cancer     Chronic HFmrEF  Atrial fibrillation  H/o AVR  -last echo 10/2022 with EF 45-50%, mild concentric hypertrophy  -continue Entresto, Jardiance, metoprolol  -continue amiodarone for atrial fibrillation  -free T4 elevated 2.06 but TSH wnl, recommend f/u with endocrinology as pt has known h/o a-fib     Prolonged QTc  -QTc 526 on admission  -patient on amiodarone, has RBB at baseline  -avoid additional QT-prolonging meds, dexamethasone prn for nausea     Left shin wound  Skin abrasions LLE and left elbow  -continue clindamycin, completed Bactrim  -added probiotic  -WOC evaluated, recommended to continue dressing changes every 3 days using Xeroform/silicone foam dressing.     HTN  HLD  -Continue metoprolol, statin     IDDM2  -A1c 8.8%  -follows with endocrinology, on Trulicity 1.5 mg weekly, Lantus 50 units daily, and Jardiance 25 mg daily at home  -started basal bolus regimen with Levemir 20 units daily plus SSI  -continue Jardiance  -BGs stable     HUSSAIN  -CPAP    Expected Discharge Location and Transportation: SNF rehab  Expected Discharge pending bed offer, insurance approval  Expected Discharge Date: 12/4/2023; Expected Discharge Time:      DVT prophylaxis:  Medical DVT prophylaxis orders are present.     AM-PAC 6 Clicks Score (PT): 18 (12/03/23 2000)    CODE STATUS:   Code Status and Medical Interventions:   Ordered at: 11/29/23 1641     Medical Intervention Limits:    NO intubation (DNI)    NO artificial nutrition     Code Status (Patient has no pulse and is not breathing):    CPR (Attempt to Resuscitate)     Medical Interventions (Patient has pulse or is breathing):    Limited Support       Sasha Eduardo, APRN  12/04/23

## 2023-12-04 NOTE — THERAPY TREATMENT NOTE
Patient Name: Blane Dietz  : 1935    MRN: [id number]                              Today's Date: 2023       Admit Date: 2023    Visit Dx:     ICD-10-CM ICD-9-CM   1. COVID-19  U07.1 079.89   2. Generalized weakness  R53.1 780.79   3. On antineoplastic chemotherapy  Z79.899 V58.69   4. History of diabetes mellitus  Z86.39 V12.29   5. Pneumonia due to COVID-19 virus  U07.1 480.8    J12.82 079.89   6. Upper respiratory tract infection due to COVID-19 virus  U07.1 465.9    J06.9 079.89   7. Stage 3b chronic kidney disease  N18.32 585.3   8. Essential hypertension  I10 401.9   9. Type 2 diabetes mellitus with hyperglycemia, with long-term current use of insulin  E11.65 250.00    Z79.4 790.29     V58.67     Patient Active Problem List   Diagnosis    Essential hypertension    Type 2 diabetes mellitus with hyperglycemia, with long-term current use of insulin    Prostate cancer    Aortic stenosis    Sleep apnea    S/P AVR    HLD (hyperlipidemia)    Renal mass    s/p right nepheroureterectomy and adrenalectomy     Diffuse large B-cell lymphoma of solid organ excluding spleen    PICC (peripherally inserted central catheter) flush    History of pulmonary embolism    History of malignant neoplasm of prostate    Lymphoma of kidney    Stage 3b chronic kidney disease    Upper respiratory tract infection due to COVID-19 virus    Pneumonia due to COVID-19 virus     Past Medical History:   Diagnosis Date    Aortic stenosis     status post ROSS procedure, remote, with 2015 echocardiography revealing normal aortic and pulmonary valve function, normal ejection fraction and mild to moderate MR    Arthritis     Bilateral impacted cerumen 2016    Bronchitis     CHF (congestive heart failure)     Chronic gout of right foot 2016    Impression: 2016 - stable.;     COVID-19 vaccine series completed 2021    [place] 2nd dose 3/12/21.    Depression     Diabetes mellitus     Diverticulitis      Exogenous obesity     Gout     Heart murmur     History of vitamin D deficiency     Hypercholesteremia 08/11/2016    Hyperlipidemia     Hypertension     Kidney lesion     Malignant neoplasm of prostate     Morbid obesity 08/11/2016    Pneumonia 05/12/2021    Primary osteoarthritis involving multiple joints 06/13/2016    Impression: 03/08/2016 - stable;     Pulmonary embolism     Sleep apnea 05/12/2021    C-Pap    Uncontrolled type 2 diabetes mellitus with hyperglycemia 06/13/2016    Impression: 03/08/2016 - seeing Endo .;     Vertigo      Past Surgical History:   Procedure Laterality Date    CARDIAC VALVE REPLACEMENT  05/12/2021    Ross procedure 22 years ago    COLON SURGERY      COLONOSCOPY      COLOSTOMY  1999    COLOSTOMY REVISION      CYSTOSCOPY N/A 12/7/2021    Procedure: CYSTOSCOPY FLEXIBLE;  Surgeon: José Miguel Villafuerte Jr., MD;  [facility];  Service: Urology;  Laterality: N/A;    EXPLORATORY LAPAROTOMY      With Tissue Removal    LIPOMA EXCISION      MOHS SURGERY      NEPHROURETERECTOMY Right 12/7/2021    Procedure: RIGHT NEPHROURETERECTOMY LAPAROSCOPIC WITH DAVINCI ROBOT;  Surgeon: José Miguel Villafuerte Jr., MD;  Location:  [facility];  Service: Robotics - DaVinci;  Laterality: Right;    OTHER SURGICAL HISTORY      Porcine Valve    PROSTATE SURGERY      PROSTATECTOMY  2000     History of Prostatectomy Perineal Radical    SKIN CANCER EXCISION      from head and lip    TONSILLECTOMY        General Information       Row Name 12/04/23 1436          Physical Therapy Time and Intention    Document Type therapy note (daily note)  -LO     Mode of Treatment physical therapy  -LO       Row Name 12/04/23 1436          General Information    Patient Profile Reviewed yes  -LO     Existing Precautions/Restrictions fall  -LO       Row Name 12/04/23 1436          Cognition    Orientation Status (Cognition) oriented x 3  -LO       Row Name 12/04/23 1436          Safety Issues, Functional Mobility    Impairments  Affecting Function (Mobility) balance;endurance/activity tolerance;strength  -LO               User Key  (r) = Recorded By, (t) = Taken By, (c) = Cosigned By      Initials Name Provider Type    Marisela Fabian, AVINASH Physical Therapist                   Mobility       Row Name 12/04/23 1504          Bed Mobility    Comment, (Bed Mobility) UIC  -LO       Row Name 12/04/23 1504          Transfers    Comment, (Transfers) recliner<>FWW good sequencing  -LO       Row Name 12/04/23 1504          Sit-Stand Transfer    Sit-Stand [place] (Transfers) verbal cues;supervision  -LO     Assistive Device (Sit-Stand Transfers) walker, front-wheeled  -LO       Row Name 12/04/23 1504          Gait/Stairs (Locomotion)    [place] Level (Gait) verbal cues;supervision  -LO     Assistive Device (Gait) walker, front-wheeled  -LO     Distance in Feet (Gait) 80  -LO     Deviations/Abnormal Patterns (Gait) gait speed decreased;stride length decreased;binta decreased  -LO     Bilateral Gait Deviations forward flexed posture  -LO     Comment, (Gait/Stairs) Patient ambulates with FWW supervision with good setp length, height, speed. No evidence for losses of balance.  -LO               User Key  (r) = Recorded By, (t) = Taken By, (c) = Cosigned By      Initials Name Provider Type    Marisela Fabian PT Physical Therapist                   Obj/Interventions       Row Name 12/04/23 1506          Range of Motion Comprehensive    General Range of Motion bilateral upper extremity ROM WNL  -LO       Row Name 12/04/23 1506          Balance    Balance Assessment sitting static balance;sitting dynamic balance;standing static balance;standing dynamic balance  -LO     Static Sitting Balance independent  -LO     Dynamic Sitting Balance independent  -LO     Position, Sitting Balance unsupported;sitting in chair  -LO     Static Standing Balance supervision  -LO     Dynamic Standing Balance supervision  -LO     Position/Device Used, Standing Balance  supported;walker, front-wheeled  -LO     Comment, Balance FWW supervision for safety in standing  -LO               User Key  (r) = Recorded By, (t) = Taken By, (c) = Cosigned By      Initials Name Provider Type    Marisela Fabian, PT Physical Therapist                   Goals/Plan    No documentation.                  Clinical Impression       Row Name 12/04/23 1507          Pain    Pretreatment Pain Rating 0/10 - no pain  -LO     Posttreatment Pain Rating 0/10 - no pain  -LO       Row Name 12/04/23 1507          Plan of Care Review    Plan of Care Reviewed With patient  -LO     Progress improving  -     Outcome Evaluation Patient continuing to demonstrate progressions in therapy this date. Ambulates 80' FWW supervision, no evidences for losses of balance and VS stable throughout. Good sequencing for transfers this date. Patient will continue to benefit from skilled IP PT services to address impairments for return to PLOF. Recommendation changing to home with assist and HHPT at [place]  -       Row Name 12/04/23 1507          Therapy Assessment/Plan (PT)    Rehab Potential (PT) good, to achieve stated therapy goals  -     Criteria for Skilled Interventions Met (PT) yes;skilled treatment is necessary  -     Therapy Frequency (PT) daily  -LO       Row Name 12/04/23 1507          Vital Signs    O2 Delivery Pre Treatment room air  -LO     O2 Delivery Intra Treatment room air  -LO     O2 Delivery Post Treatment room air  -LO     Pre Patient Position Sitting  -LO     Intra Patient Position Standing  -LO     Post Patient Position Sitting  -       Row Name 12/04/23 1507          Positioning and Restraints    Pre-Treatment Position sitting in chair/recliner  -LO     Post Treatment Position chair  -LO     In Chair notified nsg;call light within reach;encouraged to call for assist;with family/caregiver;waffle cushion  -               User Key  (r) = Recorded By, (t) = Taken By, (c) = Cosigned By      Initials Name  Provider Type    Marisela Fabian, PT Physical Therapist                   Outcome Measures       Row Name 12/04/23 1510          How much help from another person do you currently need...    Turning from your back to your side while in flat bed without using bedrails? 4  -LO     Moving from lying on back to sitting on the side of a flat bed without bedrails? 4  -LO     Moving to and from a bed to a chair (including a wheelchair)? 4  -LO     Standing up from a chair using your arms (e.g., wheelchair, bedside chair)? 4  -LO     Climbing 3-5 steps with a railing? 3  -LO     To walk in hospital room? 3  -LO     AM-PAC 6 Clicks Score (PT) 22  -LO     Highest Level of Mobility Goal 7 --> Walk 25 feet or more  -       Row Name 12/04/23 1510          Functional Assessment    Outcome Measure Options AM-PAC 6 Clicks Basic Mobility (PT)  -               User Key  (r) = Recorded By, (t) = Taken By, (c) = Cosigned By      Initials Name Provider Type    Marisela Fabian, PT Physical Therapist                                 Physical Therapy Education       Title: PT OT SLP Therapies (In Progress)       Topic: Physical Therapy (Done)       Point: Mobility training (Done)       Learning Progress Summary             Patient Acceptance, E, VU,NR by  at 12/4/2023 1314    Comment: PT POC                         Point: Home exercise program (Done)       Learning Progress Summary             Patient Acceptance, E, VU,NR by  at 12/4/2023 1314    Comment: PT POC                         Point: Body mechanics (Done)       Learning Progress Summary             Patient Acceptance, E, VU,NR by  at 12/4/2023 1314    Comment: PT POC                         Point: Precautions (Done)       Learning Progress Summary             Patient Acceptance, E, VU,NR by  at 12/4/2023 1314    Comment: PT POC                                         User Key       Initials Effective Dates Name Provider Type [facility] 06/16/21 -  Marisela Ponce, PT  Physical Therapist PT                  PT Recommendation and Plan     Plan of Care Reviewed With: patient  Progress: improving  Outcome Evaluation: Patient continuing to demonstrate progressions in therapy this date. Ambulates 80' FWW supervision, no evidences for losses of balance and VS stable throughout. Good sequencing for transfers this date. Patient will continue to benefit from skilled IP PT services to address impairments for return to PLOF. Recommendation changing to home with assist and HHPT at dc     Time Calculation:         PT Charges       Row Name 12/04/23 1315             Time Calculation    Start Time 1315  -LO      PT Received On 12/04/23  -LO      PT Goal Re-Cert Due Date 12/10/23  -LO         Timed Charges    62804 - Gait Training Minutes  14  -LO      45364 - PT Therapeutic Activity Minutes 28  -LO         Total Minutes    Timed Charges Total Minutes 42  -LO       Total Minutes 42  -LO                User Key  (r) = Recorded By, (t) = Taken By, (c) = Cosigned By      Initials Name Provider Type    Marisela Fabian, PT Physical Therapist                  Therapy Charges for Today       Code Description Service Date Service Provider Modifiers Qty    65654238027 HC GAIT TRAINING EA 15 MIN 12/4/2023 Marisela Ponce, PT GP 1    25078403560 HC PT THERAPEUTIC ACT EA 15 MIN 12/4/2023 Marisela Ponce, PT GP 2            PT G-Codes  Outcome Measure Options: AM-PAC 6 Clicks Basic Mobility (PT)  AM-PAC 6 Clicks Score (PT): 22  AM-PAC 6 Clicks Score (OT): 19  PT Discharge Summary  Anticipated Discharge Disposition (PT): home with assist, home with home health    Marisela Ponce, AVINASH  12/4/2023

## 2023-12-04 NOTE — PLAN OF CARE
Goal Outcome Evaluation:  Plan of Care Reviewed With: patient        Progress: improving  Outcome Evaluation: Patient continuing to demonstrate progressions in therapy this date. Ambulates 80' FWW supervision, no evidences for losses of balance and VS stable throughout. Good sequencing for transfers this date. Patient will continue to benefit from skilled IP PT services to address impairments for return to PLOF. Recommendation changing to home with assist and HHPT at [place]      Anticipated Discharge Disposition (PT): home with assist, home with home health

## 2023-12-04 NOTE — PLAN OF CARE
Problem: Adult Inpatient Plan of Care  Goal: Plan of Care Review  Outcome: Ongoing, Progressing  Goal: Patient-Specific Goal (Individualized)  Outcome: Ongoing, Progressing  Goal: Absence of Hospital-Acquired Illness or Injury  Outcome: Ongoing, Progressing  Intervention: Identify and Manage Fall Risk  Description: Perform standard risk assessment on admission using a validated tool or comprehensive approach appropriate to the patient; reassess fall risk frequently, with change in status or transfer to another level of care.  Communicate fall injury risk to interprofessional healthcare team.  Determine need for increased observation, equipment and environmental modification, such as low bed, signage and supportive, nonskid footwear.  Adjust safety measures to individual developmental age, stage and identified risk factors.  Reinforce the importance of safety and physical activity with patient and family.  Perform regular intentional rounding to assess need for position change, pain assessment and personal needs, including assistance with toileting.  Recent Flowsheet Documentation  Taken 12/3/2023 2000 by Gely Wylie RN  Safety Promotion/Fall Prevention:   activity supervised   safety round/check completed   room organization consistent   nonskid shoes/slippers when out of bed   lighting adjusted   fall prevention program maintained   assistive device/personal items within reach   mobility aid in reach  Intervention: Prevent Skin Injury  Description: Perform a screening for skin injury risk, such as pressure or moisture associated skin damage on admission and at regular intervals throughout hospital stay.  Keep all areas of skin (especially folds) clean and dry.  Maintain adequate skin hydration.  Relieve and redistribute pressure and protect bony prominences; implement measures based on patient-specific risk factors.  Match turning and repositioning schedule to clinical condition.  Encourage weight shift  frequently; assist with reposition if unable to complete independently.  Float heels off bed; avoid pressure on the Achilles tendon.  Keep skin free from extended contact with medical devices.  Encourage functional activity and mobility, as early as tolerated.  Use aids (e.g., slide boards, mechanical lift) during transfer.  Recent Flowsheet Documentation  Taken 12/3/2023 2000 by Gely Wylie RN  Body Position: position changed independently  Intervention: Prevent and Manage VTE (Venous Thromboembolism) Risk  Description: Assess for VTE (venous thromboembolism) risk.  Encourage and assist with early ambulation.  Initiate and maintain compression or other therapy, as indicated, based on identified risk in accordance with organizational protocol and provider order.  Encourage both active and passive leg exercises while in bed, if unable to ambulate.  Recent Flowsheet Documentation  Taken 12/3/2023 2000 by Gely Wylie RN  Activity Management: activity encouraged  Intervention: Prevent Infection  Description: Maintain skin and mucous membrane integrity; promote hand, oral and pulmonary hygiene.  Optimize fluid balance, nutrition, sleep and glycemic control to maximize infection resistance.  Identify potential sources of infection early to prevent or mitigate progression of infection (e.g., wound, lines, devices).  Evaluate ongoing need for invasive devices; remove promptly when no longer indicated.  Recent Flowsheet Documentation  Taken 12/3/2023 2000 by Gely Wylie RN  Infection Prevention:   hand hygiene promoted   rest/sleep promoted   single patient room provided   environmental surveillance performed  Goal: Optimal Comfort and Wellbeing  Outcome: Ongoing, Progressing  Intervention: Provide Person-Centered Care  Description: Use a family-focused approach to care.  Develop trust and rapport by proactively providing information, encouraging questions, addressing concerns and offering  reassurance.  Acknowledge emotional response to hospitalization.  Recognize and utilize personal coping strategies.  Honor spiritual and cultural preferences.  Recent Flowsheet Documentation  Taken 12/3/2023 2000 by Gely Wylie RN  Trust Relationship/Rapport:   thoughts/feelings acknowledged   reassurance provided  Goal: Readiness for Transition of Care  Outcome: Ongoing, Progressing     Problem: Skin Injury Risk Increased  Goal: Skin Health and Integrity  Outcome: Ongoing, Progressing  Intervention: Optimize Skin Protection  Description: Perform a full pressure injury risk assessment, as indicated by screening, upon admission to care unit.  Reassess skin (injury risk, full inspection) frequently (e.g., scheduled interval, with change in condition) to provide optimal early detection and prevention.  Maintain adequate tissue perfusion (e.g., encourage fluid balance; avoid crossing legs, constrictive clothing or devices) to promote tissue oxygenation.  Maintain head of bed at lowest degree of elevation tolerated, considering medical condition and other restrictions.  Avoid positioning onto an area that remains reddened.  Minimize incontinence and moisture (e.g., toileting schedule; moisture-wicking pad, diaper or incontinence collection device; skin moisture barrier).  Cleanse skin promptly and gently when soiled utilizing a pH-balanced cleanser.  Relieve and redistribute pressure (e.g., scheduled position changes, weight shifts, use of support surface, medical device repositioning, protective dressing application, use of positioning device, microclimate control, use of pressure-injury-monitor  Encourage increased activity, such as sitting in a chair at the bedside or early mobilization, when able to tolerate.  Recent Flowsheet Documentation  Taken 12/3/2023 2000 by Gely Wylie RN  Head of Bed (HOB) Positioning: HOB at 30 degrees     Problem: Fall Injury Risk  Goal: Absence of Fall and Fall-Related  Injury  Outcome: Ongoing, Progressing  Intervention: Identify and Manage Contributors  Description: Develop a fall prevention plan with the patient and caregiver/family.  Provide reorientation, appropriate sensory stimulation and routines with changes in mental status to decrease risk of fall.  Promote use of personal vision and auditory aids.  Assess assistance level required for safe and effective self-care; provide support as needed, such as toileting, mobilization. For age 65 and older, implement timed toileting with assistance.  Encourage physical activity, such as performance of mobility and self-care at highest level of patient ability, multicomponent exercise program and provision of appropriate assistive devices.  If fall occurs, assess the severity of injury; implement fall injury protocol. Determine the cause and revise fall injury prevention plan.  Regularly review medication contribution to fall risk; adjust medication administration times to minimize risk of falling.  Consider risk related to polypharmacy and age.  Balance adequate pain management with potential for oversedation.  Recent Flowsheet Documentation  Taken 12/3/2023 2000 by Gely Wylie RN  Medication Review/Management: medications reviewed  Intervention: Promote Injury-Free Environment  Description: Provide a safe, barrier-free environment that encourages independent activity.  Keep care area uncluttered and well-lighted.  Determine need for increased observation or monitoring.  Avoid use of devices that minimize mobility, such as restraints or indwelling urinary catheter.  Recent Flowsheet Documentation  Taken 12/3/2023 2000 by Gely Wylie RN  Safety Promotion/Fall Prevention:   activity supervised   safety round/check completed   room organization consistent   nonskid shoes/slippers when out of bed   lighting adjusted   fall prevention program maintained   assistive device/personal items within reach   mobility aid in reach      Problem: Diabetes Comorbidity  Goal: Blood Glucose Level Within Targeted Range  Outcome: Ongoing, Progressing     Problem: Hypertension Comorbidity  Goal: Blood Pressure in Desired Range  Outcome: Ongoing, Progressing  Intervention: Maintain Blood Pressure Management  Description: Evaluate adherence to home antihypertensive regimen (e.g., exercise and activity, diet modification, medication).  Provide scheduled antihypertensive medication; consider administration time and effects (e.g., avoid giving diuretic prior to bedtime).  Monitor response to antihypertensive medication therapy (e.g., blood pressure, electrolyte levels, medication effects).  Minimize risk of orthostatic hypotension; encourage caution with position changes, particularly if elderly.  Recent Flowsheet Documentation  Taken 12/3/2023 2000 by Gely Wylie, RN  Medication Review/Management: medications reviewed   Goal Outcome Evaluation:

## 2023-12-04 NOTE — OUTREACH NOTE
Prep Survey      Flowsheet Row Responses   Restorationist facility patient discharged from? [place]   Is LACE score < 7 ? No   Eligibility [facility]   Date of Admission 11/29/23   Date of Discharge 12/04/23   Discharge Disposition Home-Health Care Svc   Discharge diagnosis Upper respiratory tract infection & PNA  due to COVID-19 virus   Does the patient have one of the following disease processes/diagnoses(primary or secondary)? Pneumonia   Does the patient have Home health ordered? Yes   What is the Home health agency?  [facility]   Is there a DME ordered? No   General alerts for this patient Maryann DONNELLY   Prep survey completed? Yes            Nita WAYNE - Registered Nurse

## 2023-12-04 NOTE — TELEPHONE ENCOUNTER
Caller: Susana Dietz    Relationship: Emergency Contact    Best call back number: [phone]     What is the best time to reach you: ANY    Who are you requesting to speak with (clinical staff, provider,  specific staff member): NURSE    Do you know the name of the person who called: DAUGHTER    What was the call regarding: PATIENT'S DAUGHTER WOULD LIKE UPDATED FMLA PAPERWORK FILLED OUT.  PATIENT IN THE HOSPITAL WITH COVID.  SHE WILL DROP OFF AND REQUEST MAIL RETURN.      Is it okay if the provider responds through [facility]: CALL IF NEEDED

## 2023-12-04 NOTE — DISCHARGE SUMMARY
[facility] Medicine Services  DISCHARGE SUMMARY    Patient Name: Blane Dietz  : 1935  MRN: [id number]    Date of Admission: 2023  1:32 PM  Date of Discharge:  2023  Primary Care Physician: David Em MD    Consults       No orders found from 10/31/2023 to 2023.            Hospital Course     Presenting Problem: Frequent falls    Active Hospital Problems    Diagnosis  POA    Stage 3b chronic kidney disease [N18.32]  Yes    HLD (hyperlipidemia) [E78.5]  Yes    Sleep apnea [G47.30]  Yes    Type 2 diabetes mellitus with hyperglycemia, with long-term current use of insulin [E11.65, Z79.4]  Not Applicable    Essential hypertension [I10]  Yes      Resolved Hospital Problems    Diagnosis Date Resolved POA    **Upper respiratory tract infection due to COVID-19 virus [U07.1, J06.9] 2023 Yes    Pneumonia due to COVID-19 virus [U07.1, J12.82] 2023 Yes          Hospital Course:  Blane Dietz is a 88 y.o. 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 ED at the recommendation of Dr. Ramon for continued weakness. Patient was seen in [facility] ED  for weakness and a fall at home. On , his daughter called Dr. Ramon's office after patient slid out of bed trying to stand today at his assisted-living facility and had to have EMS get him off the floor. Patient presented to [facility] ED and was found to be positive for COVID-19.      Patient was afebrile on admission and WBCs were 9.42. Treatment with remdesevir was deferred as patient was minimally symptomatic and patient has known CKD and one kidney. He remained on room air and did not require dexamethasone. He will complete 10 days' isolation on .     COVID-19 infection  -Dx on , symptom onset ,  isolation through 12/9 to complete 10 days  -Continue supportive care with scheduled Mucinex, prn tessalon perles, prn Robitussin     Weakness, fall at home  -CT head negative at [facility]  -PT and OT following, recommend home with home health, patient to return to assisted living     Elevated troponin  -troponin 32 down to 27 on admission, likely related to CKD/supply-demand mismatch, pt denied chest pain  -EKG with normal sinus rhythm and right bundle branch block.     Elevated creatinine  CKD III  H/o right nephrectomy  -baseline ~1.45-1.5  -creatinine 1.7 on 12/1>> 1.32 today, 12/4  -Continue renally dosed allopurinol     Thrombocytopenia  -on chemotherapy, drop from 148 on 11/1 >> 88 on admission  -Lovenox for DVT ppx, CrCl acceptable  -plts improving, 101 on 12/3     Prostate cancer with bone metastasis  Hx of PEs  -Follows with Dr. Ramon, continue Xandi  -previously anticoagulated with Eliquis  -Lovenox for DVT ppx given h/o cancer     Chronic HFmrEF  Atrial fibrillation  H/o AVR  -last echo 10/2022 with EF 45-50%, mild concentric hypertrophy  -continue Entresto, Jardiance, metoprolol  -continue amiodarone for atrial fibrillation  -free T4 elevated 2.06 but TSH wnl, recommend f/u with endocrinology as pt has known h/o a-fib     Prolonged QTc  -QTc 526 on admission  -patient on amiodarone, has RBB at baseline  -avoid additional QT-prolonging meds     Left shin wound  Skin abrasions LLE and left elbow  -continue clindamycin, completed Bactrim  -continue probiotic while on clindamycin  -WOC evaluated, recommended to continue dressing changes every 3 days using Xeroform/silicone foam dressing.     HTN  HLD  -Continue metoprolol, statin     IDDM2  -A1c 8.8%  -follows with endocrinology, on Trulicity 1.5 mg weekly, Lantus 50 units daily, and Jardiance 25 mg daily at home  -continue home meds     HUSSAIN  -CPAP nightly      Discharge Follow Up Recommendations for outpatient labs/diagnostics:  --Follow up with PCP  after completing isolation 12/9/23, recommend BMP, CBC and EKG to monitor QTc  --Continue wound care to LLE until seen by PCP.    Day of Discharge     HPI:   Patient resting in bed. Says he feels ready to go to rehab today. Had a bed at [facility] and was waiting on insurance approval. However, PT evaluated patient and felt he was okay to go home with home health, which he was getting p/t admission. Patient denies concerns.       Review of Systems  Gen- No fevers, chills  CV- No chest pain, palpitations  Resp- No cough, dyspnea  GI- No N/V/D, abd pain      Vital Signs:   Temp:  [97.5 °F (36.4 °C)-98.1 °F (36.7 °C)] 97.5 °F (36.4 °C)  Heart Rate:  [75-94] 79  Resp:  [16-19] 18  BP: (100-172)/(68-78) 118/68      Physical Exam:  Constitutional: No acute distress, awake, alert  HENT: NCAT, mucous membranes moist  Respiratory: Clear to auscultation bilaterally, respiratory effort normal, room air  Cardiovascular: RRR, no murmurs, rubs, or gallops  Gastrointestinal: Positive bowel sounds, soft, nontender, nondistended  Musculoskeletal: No bilateral ankle edema  Psychiatric: Appropriate affect, cooperative  Neurologic: Oriented x 3, moves all extremities, Cranial Nerves grossly intact to confrontation, speech clear  Skin: No rashes, Mepilex dressing LLE CDI    Pertinent  and/or Most Recent Results     LAB RESULTS:      Lab 12/03/23  0444 12/01/23  0356 11/30/23  0319 11/29/23  1748 11/29/23  1357 11/28/23  1929   WBC 6.73 8.10 8.03 8.48 9.42 9.80   HEMOGLOBIN 13.2 12.9* 13.6 11.8* 14.3 13.8   HEMATOCRIT 41.1 39.9 41.0 37.1* 44.7 42.0   PLATELETS 101* 90* 85* 67* 88* 84*   NEUTROS ABS  --  4.13 5.94 5.79 7.54* 7.31*  7.55*   IMMATURE GRANS (ABS)  --   --   --  0.63*  --  0.61*   LYMPHS ABS  --   --   --  0.93  --  0.45*   MONOS ABS  --   --   --  0.89  --  1.00*   EOS ABS  --  0.32 0.32 0.10 0.09 0.19  0.10   MCV 93.6 95.2 91.3 96.6 94.1 92.3   CRP  --   --   --   --  2.91*  --          Lab 12/04/23  0718 12/03/23  0444  12/02/23  0329 12/01/23  1025 12/01/23  0722 11/30/23  0319 11/29/23  1357   SODIUM 138 138 135*  --  136 132* 131*   POTASSIUM 5.2 4.6 4.8  --  4.6 4.4 5.2   CHLORIDE 111* 109* 107  --  108* 102 100   CO2 18.0* 16.0* 17.0*  --  16.0* 20.0* 19.0*   ANION GAP 9.0 13.0 11.0  --  12.0 10.0 12.0   BUN 36* 36* 39*  --  41* 30* 28*   CREATININE 1.32* 1.50* 1.69*  --  1.71* 1.52* 1.64*   EGFR 52.5* 45.1* 39.1*  --  38.5* 44.3* 40.5*   GLUCOSE 125* 108* 93  --  97 93 202*   CALCIUM 8.2* 8.1* 8.0*  --  7.7* 8.0* 8.3*   IONIZED CALCIUM  --   --   --  1.14  --   --   --    MAGNESIUM  --   --   --   --   --   --  2.5*   TSH  --   --   --   --   --   --  1.470         Lab 12/04/23  0718 12/03/23  0444 12/02/23 0329 12/01/23 0722 11/30/23  0319   TOTAL PROTEIN 5.7* 6.2 6.1 5.7* 6.1   ALBUMIN 3.6 3.3* 3.2* 3.5 3.3*   GLOBULIN 2.1 2.9 2.9 2.2 2.8   ALT (SGPT) 24 25 24 28 32   AST (SGOT) 38 39 40 45* 49*   BILIRUBIN 0.6 0.6 0.7 0.8 0.9   ALK PHOS 209* 208* 192* 195* 187*         Lab 11/29/23  1748 11/29/23  1357 11/28/23  1929   HSTROP T 27* 32* 25*             Lab 11/29/23  1357   FERRITIN 290.50         Brief Urine Lab Results  (Last result in the past 365 days)        Color   Clarity   Blood   Leuk Est   Nitrite   Protein   CREAT   Urine HCG        11/30/23 1620 Yellow   Clear   Negative   Negative   Negative   Negative                 Microbiology Results (last 10 days)       Procedure Component Value - Date/Time    COVID PRE-OP / PRE-PROCEDURE SCREENING ORDER (NO ISOLATION) - Swab, Nasopharynx [[id number]]  (Abnormal) Collected: 11/29/23 1400    Lab Status: Final result Specimen: Swab from Nasopharynx Updated: 11/29/23 1445    Narrative:      The following orders were created for panel order COVID PRE-OP / PRE-PROCEDURE SCREENING ORDER (NO ISOLATION) - Swab, Nasopharynx.  Procedure                               Abnormality         Status                     ---------                               -----------         ------                      COVID-19 and FLU A/B PCR...[[id number]]  Abnormal            Final result                 Please view results for these tests on the individual orders.    COVID-19 and FLU A/B PCR, 1 HR TAT - Swab, Nasopharynx [[id number]]  (Abnormal) Collected: 11/29/23 1400    Lab Status: Final result Specimen: Swab from Nasopharynx Updated: 11/29/23 1444     COVID19 Detected     Influenza A PCR Not Detected     Influenza B PCR Not Detected    Narrative:      Fact sheet for providers: https://www.fda.gov/media/919191/download    Fact sheet for patients: https://www.fda.gov/media/768228/download    Test performed by PCR.  Influenza A and Influenza B negative results should be considered presumptive in samples that have a positive SARS-CoV-2 result.    Competitive inhibition studies showed that SARS-CoV-2 virus, when present at concentrations above 3.6E+04 copies/mL, can inhibit the detection and amplification of influenza A and influenza B virus RNA if present at or below 1.8E+02 copies/mL or 4.9E+02 copies/mL, respectively, and may lead to false negative influenza virus results. If co-infection with influenza A or influenza B virus is suspected in samples with a positive SARS-CoV-2 result, the sample should be re-tested with another FDA cleared, approved, or authorized influenza test, if influenza virus detection would change clinical management.    Culture, Routine - Swab, Leg, Left [[id number]] Collected: 11/25/23 1518    Lab Status: Final result Specimen: Swab from Leg, Left Updated: 11/30/23 2008     Culture Final report     Result 1 Mixed skin gaudencio    Narrative:      Performed at:  [address] [place] [address]  [id number]  : Masoud Platt PhD, Phone:  [phone]            XR Chest 1 View    Result Date: 11/29/2023  XR CHEST 1 VW Date of Exam: 11/29/2023 2:03 PM EST Indication: Weak/Dizzy/AMS triage protocol Comparison: 11/23/2022 Findings: Previously seen pulmonary vascular  congestion and bibasilar atelectasis has resolved. There is stable moderate cardiomegaly. Pulmonary vessels appear within normal limits on the current exam. Lung fields are clear. There are no effusions.    Impression: No acute process. Stable cardiomegaly. Electronically Signed: Bella Chapman MD  11/29/2023 2:24 PM EST  Workstation ID: [id number]    CT Head Without Contrast    Result Date: 11/28/2023  FINAL REPORT TECHNIQUE: Routine axial images through the head were obtained without contrast. CLINICAL HISTORY: Positive LOC with fall FINDINGS: There is  mild generalized cerebral atrophy.  The ventricles are dilated, but proportionate to the degree of atrophy.  There is no evidence of hemorrhage, mass or other acute abnormality.  No acute osseous or sinus abnormality is seen. There is no skull fracture.     No acute intracranial abnormality. Authenticated and Electronically Signed by Willis Quiñones M.D. on 11/28/2023 07:42:04 PM     Results for orders placed during the hospital encounter of 10/25/22    Duplex Venous Lower Extremity - Bilateral CAR    Interpretation Summary    Chronic right lower extremity deep vein thrombosis noted in the posterior tibial.    Chronic right lower extremity superficial thrombophlebitis noted in the small saphenous.    All other veins appeared normal bilaterally.      Results for orders placed during the hospital encounter of 10/25/22    Duplex Venous Lower Extremity - Bilateral CAR    Interpretation Summary    Chronic right lower extremity deep vein thrombosis noted in the posterior tibial.    Chronic right lower extremity superficial thrombophlebitis noted in the small saphenous.    All other veins appeared normal bilaterally.      Results for orders placed during the hospital encounter of 10/25/22    Adult Transthoracic Echo Complete With Contrast if Necessary Per Protocol    Interpretation Summary    Left ventricular wall thickness is consistent with mild concentric hypertrophy.    The  right ventricular cavity is dilated.    Left atrial volume is moderately increased.    There is a bioprosthetic aortic valve present.    Estimated right ventricular systolic pressure from tricuspid regurgitation is mildly elevated (35-45 mmHg). Calculated right ventricular systolic pressure from tricuspid regurgitation is 36 mmHg.    There is a prosthetic pulmonic valve present.    There is a large left pleural effusion.    Mild global LV hypokinesis    Ejection fraction estimated at 45 to 50%    Mild to moderate AI    Mild MR      Plan for Follow-up of Pending Labs/Results:     Discharge Details        Discharge Medications        New Medications        Instructions Start Date   benzonatate 100 MG capsule  Commonly known as: TESSALON   100 mg, Oral, 3 Times Daily PRN      guaiFENesin-dextromethorphan 100-10 MG/5ML syrup  Commonly known as: ROBITUSSIN DM   5 mL, Oral, Every 4 Hours PRN      saccharomyces boulardii 250 MG capsule  Commonly known as: FLORASTOR   250 mg, Oral, 2 Times Daily      Vashe Cleansing solution   1 application , Apply externally, Every 72 Hours             Changes to Medications        Instructions Start Date   clindamycin 300 MG capsule  Commonly known as: CLEOCIN  What changed: when to take this   300 mg, Oral, Every 8 Hours Scheduled      Lantus 100 UNIT/ML injection  Generic drug: insulin glargine  What changed: additional instructions   Inject up to 50 units daily subcutaneously             Continue These Medications        Instructions Start Date   allopurinol 100 MG tablet  Commonly known as: ZYLOPRIM   100 mg, Oral, Daily      amiodarone 200 MG tablet  Commonly known as: PACERONE   200 mg, Oral, Daily      bumetanide 1 MG tablet  Commonly known as: BUMEX   Taking 1  tablet by mouth every other day for 30 days      Calcium Carb-Cholecalciferol 600-5 MG-MCG tablet   1 tablet, Oral, Daily      empagliflozin 25 MG tablet tablet  Commonly known as: Jardiance   25 mg, Oral, Daily       Entresto 24-26 MG tablet  Generic drug: sacubitril-valsartan   TAKE ONE TABLET BY MOUTH EVERY 12 HOURS      fluticasone 50 MCG/ACT nasal spray  Commonly known as: Flonase   2 sprays, Nasal, Daily, 2 puffs each nostril      ketoconazole 2 % shampoo  Commonly known as: NIZORAL   1 application , Topical, 2 Times Weekly      metoprolol succinate XL 25 MG 24 hr tablet  Commonly known as: TOPROL-XL   12.5 mg, Oral, Daily      multivitamin with minerals tablet tablet   1 tablet, Oral, Daily, AREDS 2      pravastatin 40 MG tablet  Commonly known as: PRAVACHOL   TAKE ONE TABLET BY MOUTH EVERY NIGHT AT BEDTIME      Trulicity 1.5 MG/0.5ML solution pen-injector  Generic drug: Dulaglutide   1.5 mg, Subcutaneous, Weekly, Takes on Wednesdays      Xtandi 40 MG tablet tablet  Generic drug: enzalutamide   120 mg, Oral, Daily             Stop These Medications      ondansetron 8 MG tablet  Commonly known as: ZOFRAN              Allergies   Allergen Reactions    Ampicillin Anaphylaxis    Medrol [Methylprednisolone] Unknown - High Severity     Made his blood sugar get really high, can't take this    Myrbetriq [Mirabegron] Unknown - High Severity     High BP    Penicillins Anaphylaxis and Shortness Of Breath    Bee Venom Swelling    Melatonin Hallucinations         Discharge Disposition:  Home-Health Care [facility]    Diet:  Hospital:  Diet Order   Procedures    Diet: Cardiac Diets, Diabetic Diets; Healthy Heart (2-3 Na+); Consistent Carbohydrate; Texture: Regular Texture (IDDSI 7); Fluid Consistency: Thin (IDDSI 0)       Diet Instructions       Diet: Cardiac Diets, Diabetic Diets; Healthy Heart (2-3 Na+); Regular Texture (IDDSI 7); Thin (IDDSI 0); Consistent Carbohydrate      Discharge Diet:  Cardiac Diets  Diabetic Diets       Cardiac Diet: Healthy Heart (2-3 Na+)    Texture: Regular Texture (IDDSI 7)    Fluid Consistency: Thin (IDDSI 0)    Diabetic Diet: Consistent Carbohydrate             Activity:  Activity Instructions       Activity  as Tolerated              Restrictions or Other Recommendations:         CODE STATUS:    Code Status and Medical Interventions:   Ordered at: 11/29/23 1641     Medical Intervention Limits:    NO intubation (DNI)    NO artificial nutrition     Code Status (Patient has no pulse and is not breathing):    CPR (Attempt to Resuscitate)     Medical Interventions (Patient has pulse or is breathing):    Limited Support       Future Appointments   Date Time Provider Department Center   12/13/2023  2:15 PM Shannan Ramon MD [facility]   12/13/2023  2:30 PM CHAIR 7 - [facility] OP INF [facility]   12/15/2023  2:30 PM Romelia Red DO [facility]   1/25/2024  2:30 PM Xavier Boston MD [facility] END BM [facility]   3/29/2024  1:00 PM David Em MD [facility] PC [facility]       Additional Instructions for the Follow-ups that You Need to Schedule       Ambulatory Referral to Home Health   As directed      Face to Face Visit Date: 11/30/2023   Follow-up provider for Plan of Care?: I treated the patient in an acute care facility and will not continue treatment after discharge.   Follow-up provider: DAVID EM [[id number]]   Reason/Clinical Findings: Upper respiratory tract infection due to COVID-19 virus   Essential hypertension   Type 2 diabetes mellitus with hyperglycemia, with long-term current use of insulin   Sleep apnea   HLD (hyperlipidemia)   Stage 3b chronic kidney disease   Describe mobility limitations that make leaving home difficult: impaired functional mobility, severe weakness   Nursing/Therapeutic Services Requested: Skilled Nursing Physical Therapy Occupational Therapy   Skilled nursing orders: Medication education Cardiopulmonary assessments   PT orders: Therapeutic exercise Gait Training Transfer training Strengthening Home safety assessment   Weight Bearing Status: As Tolerated   Occupational orders: Activities of daily living Energy conservation Strengthening Cognition Home safety assessment    Frequency: 1 Week 1        Discharge Follow-up with PCP   As directed       Currently Documented PCP:    David mE MD    PCP Phone Number:    [phone]     Follow Up Details: After isolation completed 12/9/23                  HIPOLITO Pierson  12/04/23      Time Spent on Discharge:  I spent  45  minutes on this discharge activity which included: face-to-face encounter with the patient, reviewing the data in the system, coordination of the care with the nursing staff as well as consultants, documentation, and entering orders.

## 2023-12-04 NOTE — CASE MANAGEMENT/SOCIAL WORK
Case Management Discharge Note      Final Note: Physical Therapy worked with pt today. Marisela/PT states that the pt is safe to return to [facility] with Home Health. I spoke with pt, at the bedside and his daughter/Susana, on the phone. Both are agreeable. PT recommended Home Health, pt is agreeable and was current with [facility] on admission. I spoke with Jeronimo/TOMMY, pt is set up with HH for SN/PT/OT. Orders were placed in [facility] on 11/30/23.  I spoke with Sylvia at [facility]. I read her PT's note. She states it is okay for the pt to return today. Sylvia states they do not need anything from .  She states she will assess pt when he arrives. Pt's daughter will transport pt home. She will arrive at [facility] at 1600. I updated Sasha/HIPOLITO, through messaging. She is completing Discharge Summary.         Selected Continued Care - Admitted Since 11/29/2023       Destination    No services have been selected for the patient.                Durable Medical Equipment    No services have been selected for the patient.                Dialysis/Infusion    No services have been selected for the patient.                Home Medical Care Coordination complete.      Service Provider Selected Services Address Phone Fax Patient Preferred    [facility] ,  Home Nursing ,  [facility] [address] [facility] [address] [phone] --       Internal Comment last updated by Yue Singh RN 12/4/2023 1524    12/4: Spoke with Jeronimo/[facility]. Accepted pt for PT/OT/SN. Orders were placed on 11/30/23.                         Therapy    No services have been selected for the patient.                Community Resources    No services have been selected for the patient.                Community & DME    No services have been selected for the patient.                    Selected Continued Care - Episodes Includes continued care and service providers with  selected services from the active episodes listed below      Oncology Episode start date: 9/11/2023   There are no active outsourced providers for this episode.                      Final Discharge Disposition Code: 06 - home with home health care

## 2023-12-05 ENCOUNTER — HOME HEALTH ADMISSION (OUTPATIENT)
Dept: HOME HEALTH SERVICES | Facility: HOME HEALTHCARE | Age: 88
End: 2023-12-05
Payer: MEDICARE

## 2023-12-05 ENCOUNTER — TRANSITIONAL CARE MANAGEMENT TELEPHONE ENCOUNTER (OUTPATIENT)
Dept: CALL CENTER | Facility: HOSPITAL | Age: 88
End: 2023-12-05
Payer: MEDICARE

## 2023-12-05 NOTE — OUTREACH NOTE
Call Center TCM Note      Flowsheet Row Responses   [facility] patient discharged from? [place]   Does the patient have one of the following disease processes/diagnoses(primary or secondary)? Pneumonia   TCM attempt successful? Yes   Call start time 1507   Call end time 1518   General alerts for this patient [facility]   Discharge diagnosis Upper respiratory tract infection & PNA  due to COVID-19 virus   Is patient permission given to speak with other caregiver? Yes   List who call center can speak with Susana Dietz Power of    Person spoke with today (if not patient) and relationship Susana Dietz Power of    Meds reviewed with patient/caregiver? Yes   Does the patient have all medications ordered at discharge? Yes   Is the patient taking all medications as directed (includes completed medication regime)? Yes   Comments PCP Dr Em. Hospital follow up in place with Dr Red 12/15/23  230pm   Does the patient have an appointment with their PCP within 7-14 days of discharge? Yes   What is the Home health agency?  [facility]   Has home health visited the patient within 72 hours of discharge? Call prior to 72 hours  [Start of care scheduled for tomorrow 12/6 SN, PT, OT]   Pulse Ox monitoring None   Psychosocial issues? No   Psychosocial comments Resides at [facility]   Did the patient receive a copy of their discharge instructions? Yes   Nursing interventions Reviewed instructions with patient   What is the patient's perception of their health status since discharge? Improving   If the patient is a current smoker, are they able to teach back resources for cessation? Not a smoker   Is the patient/caregiver able to teach back the hierarchy of who to call/visit for symptoms/problems? PCP, Specialist, Home health nurse, Urgent Care, ED, 911 Yes   Is the patient/caregiver able to teach back importance of completing antibiotic course of treatment? Yes   TCM call completed? Yes    Call end time 1518   Would this patient benefit from a Referral to [facility] Social Work? No   Is the patient interested in additional calls from an ambulatory ? No            Mary Escalante RN    12/5/2023, 15:19 EST

## 2023-12-05 NOTE — TELEPHONE ENCOUNTER
Received [facility] paperwork. Will give to Dr. Em when he returns to the office.

## 2023-12-06 ENCOUNTER — HOME CARE VISIT (OUTPATIENT)
Dept: HOME HEALTH SERVICES | Facility: HOME HEALTHCARE | Age: 88
End: 2023-12-06
Payer: MEDICARE

## 2023-12-06 VITALS — SYSTOLIC BLOOD PRESSURE: 104 MMHG | DIASTOLIC BLOOD PRESSURE: 64 MMHG | HEART RATE: 88 BPM | OXYGEN SATURATION: 96 %

## 2023-12-06 LAB
QT INTERVAL: 426 MS
QTC INTERVAL: 526 MS

## 2023-12-06 PROCEDURE — G0299 HHS/HOSPICE OF RN EA 15 MIN: HCPCS

## 2023-12-06 NOTE — HOME HEALTH
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  Presented to [facility] ED for weakness.  Found to be positive for Covid-19.  Discharged home on 12.4.23.      Focus of Care: Education regarding Covid-19, wound care LLE and LUE, medication education, skin breakdown prevention    Patient's goal(s):  \"To feel well enough to finish my taxes and to throw some stuff away and to sell my two houses.  I need more energy.\"    Current Functional status/mobility/DME: Minimum assist.  Use of walker. Patient has a wheelchair, but has not used it yet.     HB status/Living Arrangements: Homebound.  Lives in senior living on second floor.     Skin Integrity/wound status: Skin tears to LLE and LUE.     Code Status: CPR    Fall Risk/Safety concerns: High fall risk.     Medication issues/Concerns:  N/A    Additional Problems/Concerns: N/A    SDOH Barriers (i.e. caregiver concerns, social isolation, transportation, food insecurity, environment, income etc.)/Need for MSW: N/A    Plan for next visit: Education regarding Covid-19, wound care LLE and LUE(supplies in home; will order more), medication education, skin breakdown prevention"

## 2023-12-08 ENCOUNTER — TELEPHONE (OUTPATIENT)
Dept: INTERNAL MEDICINE | Facility: CLINIC | Age: 88
End: 2023-12-08
Payer: MEDICARE

## 2023-12-08 NOTE — TELEPHONE ENCOUNTER
Caller: Susana Dietz    Relationship: Emergency Contact    Best call back number: [phone]    Who are you requesting to speak with (clinical staff, provider,  specific staff member): ADAM      What was the call regarding: PATIENT'S DAUGHTER CALLED STATING THAT THE PAPERWORK NEEDS TO NOT BE FILLED OUT DUE TO THE DATES ON THE PAPERWORK.  SHE STATED THAT SHE WILL BRING NEW FORMS BY THE FIRST OF THE YEAR FOR DR CHAUDHARY.

## 2023-12-08 NOTE — TELEPHONE ENCOUNTER
Caller: Susana Dietz    Relationship: Emergency Contact    Best call back number: [phone]     What is the best time to reach you: ANY    Who are you requesting to speak with (clinical staff, provider,  specific staff member): NURSE    Do you know the name of the person who called: DAUGHTER    What was the call regarding: DAUGHTER WOULD LIKE TO KNOW STATUS OF FMLA PAPERWORK SINCE DR CHAUDHARY IS GONE.      Is it okay if the provider responds through MyChart: PLEASE CALL

## 2023-12-08 NOTE — TELEPHONE ENCOUNTER
S/w patient's daughter regarding FMLA paperwork to let her know we did receive it. I let her know that Dr. Em will return to the office on 12/21 and he can review FMLA forms then. She will discuss with her HR to see if it is okay to wait that long. If it is okay to wait, daughter will not call back. She was thankful for the call back.

## 2023-12-09 ENCOUNTER — HOME CARE VISIT (OUTPATIENT)
Dept: HOME HEALTH SERVICES | Facility: HOME HEALTHCARE | Age: 88
End: 2023-12-09
Payer: MEDICARE

## 2023-12-09 PROCEDURE — G0299 HHS/HOSPICE OF RN EA 15 MIN: HCPCS

## 2023-12-10 VITALS
TEMPERATURE: 96.4 F | DIASTOLIC BLOOD PRESSURE: 82 MMHG | OXYGEN SATURATION: 95 % | SYSTOLIC BLOOD PRESSURE: 140 MMHG | HEART RATE: 86 BPM | RESPIRATION RATE: 20 BRPM

## 2023-12-11 ENCOUNTER — HOME CARE VISIT (OUTPATIENT)
Dept: HOME HEALTH SERVICES | Facility: HOME HEALTHCARE | Age: 88
End: 2023-12-11
Payer: MEDICARE

## 2023-12-11 VITALS
DIASTOLIC BLOOD PRESSURE: 65 MMHG | SYSTOLIC BLOOD PRESSURE: 130 MMHG | TEMPERATURE: 97.7 F | RESPIRATION RATE: 18 BRPM | OXYGEN SATURATION: 97 % | HEART RATE: 78 BPM

## 2023-12-11 PROCEDURE — G0151 HHCP-SERV OF PT,EA 15 MIN: HCPCS

## 2023-12-11 NOTE — HOME HEALTH
Routine Visit Note: LPN    Skill/education provided: Wound care LLE and LUE. Instructed on Covid-19.  Medication education. Instructed on diabetic foot care.     Patient/caregiver response: Patient tolerated wound care well    Plan for next visit: Education regarding Covid-19, wound care LLE and LUE(supplies in home; will order more), medication education, skin breakdown prevention    Other pertinent info: N/A

## 2023-12-11 NOTE — Clinical Note
"Physical Therapy Initial Evaluation:    Patient Presentation and Current Problem(s): 88-year-old male Presented to [facility] ED for weakness and found to be Covid-19 positive. Discharged home on 12.4.23. Pt is evaluated by PT for homehealth care planning.    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 Able to stand and walk for 10 min or more without difficulty.    Pt/CG Goal(s): \"get stronger and increase endurance and balance, have energy to go through belongings and organize home environment better\"     Weight Bearing Status and Activity Precautions: FWB, cardiopulmonary and fall precautions  Home environment/Caregiver Status: Senior independent living apartment which is very cluttered. Pt has sedentary habits and chooses not go to dining room for meals.    DME: FWW, Rollator walker, C-PAP, manual WC, bath bench, shower chair  Code Status: CPR  Fall Risk: High  Skin Integrity/wound status: Skin tears LLE and LUE.    HH-PT Focus of Care: Address pt's Debility associated with Covid-19 infection and falls. PT to focus on rehabilitation of pt from CLOF towards PLOF in keeping with pt's functional goals.    Planned PT Interventions: therapeutic activities, gait training, therapeutic exercise, transfer training, neuromuscular re-education, modalities prn for pain reduction, pt/CG education (HEP, symptom management, fall risk reduction), assist with DME as needed for safe mobility.    PT Frequency and Duration: 1w7 beginning today. Pt agreeable with this plan of care    Plan of Care Reviewed with: HH care team and David Em MD"

## 2023-12-12 ENCOUNTER — HOME CARE VISIT (OUTPATIENT)
Dept: HOME HEALTH SERVICES | Facility: HOME HEALTHCARE | Age: 88
End: 2023-12-12
Payer: MEDICARE

## 2023-12-12 PROCEDURE — G0152 HHCP-SERV OF OT,EA 15 MIN: HCPCS

## 2023-12-12 PROCEDURE — G0300 HHS/HOSPICE OF LPN EA 15 MIN: HCPCS

## 2023-12-12 NOTE — HOME HEALTH
"Physical Therapy Initial Evaluation:    Patient Presentation and Current Problem(s): 88-year-old male Presented to [facility] ED for weakness and found to be Covid-19 positive. Discharged home on 12.4.23. Pt is evaluated by PT for homehealth care planning.    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 Able to stand and walk for 10 min or more without difficulty.    Pt/CG Goal(s): \"get stronger and increase endurance and balance, have energy to go through belongings and organize home environment better\"     Weight Bearing Status and Activity Precautions: FWB, cardiopulmonary and fall precautions  Home environment/Caregiver Status: Senior independent living apartment which is very cluttered. Pt has sedentary habits and chooses not go to dining room for meals.  DME: FWW, Rollator walker, C-PAP, manual WC, bath bench, shower chair  Code Status: CPR  Fall Risk: High  Skin Integrity/wound status: Skin tears LLE and LUE.    HH-PT Focus of Care: Address pt's Debility associated with Covid-19 infection and falls. PT to focus on rehabilitation of pt from CLOF towards PLOF in keeping with pt's functional goals.    Planned PT Interventions: therapeutic activities, gait training, therapeutic exercise, transfer training, neuromuscular re-education, modalities prn for pain reduction, pt/CG education (HEP, symptom management, fall risk reduction), assist with DME as needed for safe mobility.    PT Frequency and Duration: 1w7 beginning today. Pt agreeable with this plan of care    Plan of Care Reviewed with: HH care team and David Em MD"

## 2023-12-12 NOTE — Clinical Note
HHOT evaluation in home 12/12/23    Pt is an 89 yo male living in a senior living home on second floor, in single bed apt.  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 Presented to [facility] ED for weakness. Found recently to be positive for Covid-19. PPE worn in apt during HHOT evaluation. Discharged home on 12.4.23. Pt and floor staff reports pt conts to test positive for Covid 19.  OT reached out to PT to notify. Pt is observed in a transfer, orientation and safety assessed.  Pt has had mx falls.  Currently using a FWW, apr presents with obstacles (clutter) pt must navigate around, posing and addtl fall risk.  Pt bathroom is setup with DME but pt reports having a fear of falling in shower unit.  Pt reports performing meal prep, dressing (SN provides wound care to serveral areas, covered by dressing during eval), Vitals were assessed and pt education was provided.      OT freq 1x4 Endurance, ADL retraining, HEP, vitals, safety education.  Pt is agreeable.    Goal to return to [facility]    Code: CPR

## 2023-12-13 ENCOUNTER — OFFICE VISIT (OUTPATIENT)
Dept: ONCOLOGY | Facility: CLINIC | Age: 88
End: 2023-12-13
Payer: MEDICARE

## 2023-12-13 ENCOUNTER — INFUSION (OUTPATIENT)
Dept: ONCOLOGY | Facility: HOSPITAL | Age: 88
End: 2023-12-13
Payer: MEDICARE

## 2023-12-13 ENCOUNTER — SPECIALTY PHARMACY (OUTPATIENT)
Dept: ONCOLOGY | Facility: HOSPITAL | Age: 88
End: 2023-12-13
Payer: MEDICARE

## 2023-12-13 VITALS
TEMPERATURE: 97.8 F | DIASTOLIC BLOOD PRESSURE: 74 MMHG | SYSTOLIC BLOOD PRESSURE: 160 MMHG | WEIGHT: 185 LBS | BODY MASS INDEX: 26.48 KG/M2 | HEIGHT: 70 IN | HEART RATE: 96 BPM | RESPIRATION RATE: 16 BRPM | OXYGEN SATURATION: 95 %

## 2023-12-13 DIAGNOSIS — C83.39 DIFFUSE LARGE B-CELL LYMPHOMA OF SOLID ORGAN EXCLUDING SPLEEN: Primary | ICD-10-CM

## 2023-12-13 DIAGNOSIS — C61 PROSTATE CANCER: Primary | ICD-10-CM

## 2023-12-13 DIAGNOSIS — C61 PROSTATE CANCER: ICD-10-CM

## 2023-12-13 LAB
ALBUMIN SERPL-MCNC: 3.6 G/DL (ref 3.5–5.2)
ALBUMIN/GLOB SERPL: 1.2 G/DL
ALP SERPL-CCNC: 190 U/L (ref 39–117)
ALT SERPL W P-5'-P-CCNC: 16 U/L (ref 1–41)
ANION GAP SERPL CALCULATED.3IONS-SCNC: 7 MMOL/L (ref 5–15)
AST SERPL-CCNC: 23 U/L (ref 1–40)
BASOPHILS # BLD AUTO: 0.06 10*3/MM3 (ref 0–0.2)
BASOPHILS NFR BLD AUTO: 0.8 % (ref 0–1.5)
BILIRUB SERPL-MCNC: 0.7 MG/DL (ref 0–1.2)
BUN SERPL-MCNC: 23 MG/DL (ref 8–23)
BUN/CREAT SERPL: 16.4 (ref 7–25)
CALCIUM SPEC-SCNC: 9 MG/DL (ref 8.6–10.5)
CHLORIDE SERPL-SCNC: 107 MMOL/L (ref 98–107)
CO2 SERPL-SCNC: 25 MMOL/L (ref 22–29)
CREAT SERPL-MCNC: 1.4 MG/DL (ref 0.76–1.27)
DEPRECATED RDW RBC AUTO: 50.8 FL (ref 37–54)
EGFRCR SERPLBLD CKD-EPI 2021: 48.9 ML/MIN/1.73
EOSINOPHIL # BLD AUTO: 0.59 10*3/MM3 (ref 0–0.4)
EOSINOPHIL NFR BLD AUTO: 7.7 % (ref 0.3–6.2)
ERYTHROCYTE [DISTWIDTH] IN BLOOD BY AUTOMATED COUNT: 14.9 % (ref 12.3–15.4)
GLOBULIN UR ELPH-MCNC: 2.9 GM/DL
GLUCOSE SERPL-MCNC: 253 MG/DL (ref 65–99)
HCT VFR BLD AUTO: 39.9 % (ref 37.5–51)
HGB BLD-MCNC: 12.8 G/DL (ref 13–17.7)
IMM GRANULOCYTES # BLD AUTO: 0.33 10*3/MM3 (ref 0–0.05)
IMM GRANULOCYTES NFR BLD AUTO: 4.3 % (ref 0–0.5)
LYMPHOCYTES # BLD AUTO: 0.85 10*3/MM3 (ref 0.7–3.1)
LYMPHOCYTES NFR BLD AUTO: 11.1 % (ref 19.6–45.3)
MAGNESIUM SERPL-MCNC: 2 MG/DL (ref 1.6–2.4)
MCH RBC QN AUTO: 30 PG (ref 26.6–33)
MCHC RBC AUTO-ENTMCNC: 32.1 G/DL (ref 31.5–35.7)
MCV RBC AUTO: 93.4 FL (ref 79–97)
MONOCYTES # BLD AUTO: 0.81 10*3/MM3 (ref 0.1–0.9)
MONOCYTES NFR BLD AUTO: 10.6 % (ref 5–12)
NEUTROPHILS NFR BLD AUTO: 5.03 10*3/MM3 (ref 1.7–7)
NEUTROPHILS NFR BLD AUTO: 65.5 % (ref 42.7–76)
NRBC BLD AUTO-RTO: 0 /100 WBC (ref 0–0.2)
PHOSPHATE SERPL-MCNC: 2.6 MG/DL (ref 2.5–4.5)
PLATELET # BLD AUTO: 128 10*3/MM3 (ref 140–450)
PMV BLD AUTO: 10.2 FL (ref 6–12)
POTASSIUM SERPL-SCNC: 5.6 MMOL/L (ref 3.5–5.2)
PROT SERPL-MCNC: 6.5 G/DL (ref 6–8.5)
PSA SERPL-MCNC: 0.37 NG/ML (ref 0–4)
RBC # BLD AUTO: 4.27 10*6/MM3 (ref 4.14–5.8)
SODIUM SERPL-SCNC: 139 MMOL/L (ref 136–145)
WBC NRBC COR # BLD AUTO: 7.67 10*3/MM3 (ref 3.4–10.8)

## 2023-12-13 PROCEDURE — 36415 COLL VENOUS BLD VENIPUNCTURE: CPT

## 2023-12-13 PROCEDURE — 84100 ASSAY OF PHOSPHORUS: CPT

## 2023-12-13 PROCEDURE — 85025 COMPLETE CBC W/AUTO DIFF WBC: CPT

## 2023-12-13 PROCEDURE — 96372 THER/PROPH/DIAG INJ SC/IM: CPT

## 2023-12-13 PROCEDURE — 84153 ASSAY OF PSA TOTAL: CPT

## 2023-12-13 PROCEDURE — 99214 OFFICE O/P EST MOD 30 MIN: CPT | Performed by: INTERNAL MEDICINE

## 2023-12-13 PROCEDURE — 80053 COMPREHEN METABOLIC PANEL: CPT

## 2023-12-13 PROCEDURE — 25010000002 DENOSUMAB 120 MG/1.7ML SOLUTION: Performed by: INTERNAL MEDICINE

## 2023-12-13 PROCEDURE — 1126F AMNT PAIN NOTED NONE PRSNT: CPT | Performed by: INTERNAL MEDICINE

## 2023-12-13 PROCEDURE — 96402 CHEMO HORMON ANTINEOPL SQ/IM: CPT

## 2023-12-13 PROCEDURE — 83735 ASSAY OF MAGNESIUM: CPT

## 2023-12-13 PROCEDURE — 25010000002 LEUPROLIDE 7.5 MG KIT: Performed by: INTERNAL MEDICINE

## 2023-12-13 RX ADMIN — DENOSUMAB 120 MG: 120 INJECTION SUBCUTANEOUS at 15:57

## 2023-12-13 RX ADMIN — LEUPROLIDE ACETATE 7.5 MG: KIT SUBCUTANEOUS at 15:58

## 2023-12-13 NOTE — PROGRESS NOTES
DATE OF VISIT: 12/13/2023    REASON FOR VISIT: Followup for: 1.  Diffuse large B-cell lymphoma 2.  Prostate cancer     PROBLEM LIST:  1. Prostate cancer, initially presented as [id number] status post radical  prostatectomy 2000.  A.  Patient is on Lupron plus Prolia  B.  Tried to add apalutamide second rising PSA patient declined.  C.  Whole-body PSMA PET scan done September 2023 revealed 2 separate areas of uptake in the skeleton at the L1 left transverse process and close to cartilage junction of the right third anterior rib.  D.  We will start Eligard with Xtandi September 20, 2023  2.  Right-sided pulmonary embolisms:  A.  Currently on Eliquis twice a day  3.  Type 2 diabetes  4. Hhypertension  5. Hypercholesterolemia.  6.  Right subsegmental PE:  A. Started on Eliquis twice a day March 2021  7.  Osteopenia  8.  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  Status post right nephrectomy December 7, 2021  B.  Started adjuvant chemotherapy R-CHOP January 14, 2022, status post 3 cycles completed February 23, 2022      HISTORY OF PRESENT ILLNESS: The patient is a very pleasant 88 y.o. male  with past medical history significant for diffuse large B-cell lymphoma right kidney diagnosed December 2021.  Patient status post 3 cycles of chemotherapy with R-CHOP.  Repeat PET scan showed no evidence of residual disease. The patient is here today for scheduled follow-up visit.    SUBJECTIVE: Miki is here today with his assistant.  COVID-pneumonia that required hospital admission.    Past History:  Medical His  He is recovering from recenttory: has a past medical history of Aortic stenosis, Arthritis, Bilateral impacted cerumen (11/23/2016), Bronchitis, CHF (congestive heart failure), Chronic gout of right foot (06/13/2016), COVID-19 vaccine series completed (05/12/2021), Depression, Diabetes mellitus, Diverticulitis, Exogenous obesity, Gout, Heart murmur, History of vitamin D deficiency, Hypercholesteremia  (08/11/2016), Hyperlipidemia, Hypertension, Kidney lesion, Malignant neoplasm of prostate, Morbid obesity (08/11/2016), Pneumonia (05/12/2021), Primary osteoarthritis involving multiple joints (06/13/2016), Pulmonary embolism, Sleep apnea (05/12/2021), Uncontrolled type 2 diabetes mellitus with hyperglycemia (06/13/2016), and Vertigo.   Surgical History: has a past surgical history that includes Colostomy (1999); Other surgical history; Exploratory laparotomy; Colonoscopy; Revision Colostomy; Prostate surgery; Prostatectomy (2000); Colon surgery; Tonsillectomy; Skin cancer excision; Lipoma Excision; Mohs surgery; Cardiac valve replacement (05/12/2021); nephroureterectomy (Right, 12/7/2021); and Cystoscopy (N/A, 12/7/2021).   Family History: family history includes Breast cancer in an other family member; Cancer in his mother and another family member; Diabetes in his mother and another family member; Heart disease in his father; Hypertension in an other family member; Lung cancer in his mother; Migraines in an other family member; Obesity in an other family member.   Social History: reports that he has never smoked. He has never been exposed to tobacco smoke. He has never used smokeless tobacco. He reports that he does not drink alcohol and does not use drugs.    (Not in a hospital admission)     Allergies: Ampicillin, Medrol [methylprednisolone], Myrbetriq [mirabegron], Penicillins, Bee venom, and Melatonin     Review of Systems   Constitutional:  Positive for fatigue.   Respiratory:  Positive for shortness of breath.    Gastrointestinal:  Positive for nausea.   Musculoskeletal:  Positive for arthralgias.         Current Outpatient Medications:     allopurinol (ZYLOPRIM) 100 MG tablet, Take 1 tablet by mouth Daily., Disp: 90 tablet, Rfl: 3    amiodarone (PACERONE) 200 MG tablet, Take 1 tablet by mouth Daily., Disp: , Rfl:     benzonatate (TESSALON) 100 MG capsule, Take 1 capsule by mouth 3 (Three) Times a Day As  Needed for Cough., Disp: 30 capsule, Rfl: 0    bumetanide (BUMEX) 1 MG tablet, Taking 1  tablet by mouth every other day for 30 days  Indications: Cardiac Failure, Edema, Disp: , Rfl:     Calcium Carb-Cholecalciferol 600-5 MG-MCG tablet, Take 1 tablet by mouth Daily., Disp: 30 tablet, Rfl: 3    Dulaglutide (Trulicity) 1.5 MG/0.5ML solution pen-injector, Inject 1.5 mg under the skin into the appropriate area as directed 1 (One) Time Per Week. Takes on Wednesdays, Disp: 7 mL, Rfl: 2    empagliflozin (Jardiance) 25 MG tablet tablet, Take 1 tablet by mouth Daily., Disp: 90 tablet, Rfl: 3    Entresto 24-26 MG tablet, TAKE ONE TABLET BY MOUTH EVERY 12 HOURS, Disp: 180 tablet, Rfl: 3    enzalutamide (XTANDI) 40 MG tablet tablet, Take 3 tablets by mouth Daily., Disp: 90 tablet, Rfl: 11    fluticasone (FLONASE) 50 MCG/ACT nasal spray, 2 sprays into the nostril(s) as directed by provider Daily. 2 puffs each nostril (Patient taking differently: 2 sprays into the nostril(s) as directed by provider Daily As Needed for Rhinitis or Allergies.), Disp: 1 bottle, Rfl: 0    ketoconazole (NIZORAL) 2 % shampoo, Apply 1 application  topically to the appropriate area as directed 2 (Two) Times a Week. Indications: Tinea Versicolor, Disp: , Rfl:     Lantus 100 UNIT/ML injection, Inject up to 50 units daily subcutaneously (Patient taking differently: Inject up to 20 units daily subcutaneously), Disp: 20 mL, Rfl: 5    metoprolol succinate XL (TOPROL-XL) 25 MG 24 hr tablet, Take 0.5 tablets by mouth Daily for 360 days., Disp: 45 tablet, Rfl: 3    multivitamin with minerals tablet tablet, Take 1 tablet by mouth Daily. AREDS 2  Indications: vitamin deficiency, Disp: , Rfl:     pravastatin (PRAVACHOL) 40 MG tablet, TAKE ONE TABLET BY MOUTH EVERY NIGHT AT BEDTIME, Disp: 90 tablet, Rfl: 3    saccharomyces boulardii (FLORASTOR) 250 MG capsule, Take 1 capsule by mouth 2 (Two) Times a Day for 14 days., Disp: 28 capsule, Rfl: 0    Wound Cleansers  "(Vashe Cleansing) solution, Apply 1 application  topically Every 72 (Seventy-Two) Hours for 10 days., Disp: 1000 mL, Rfl: 0    PHYSICAL EXAMINATION:   /74   Pulse 96   Temp 97.8 °F (36.6 °C) (Temporal)   Resp 16   Ht 177.8 cm (70\")   Wt 83.9 kg (185 lb)   SpO2 95%   BMI 26.54 kg/m²    Pain Score    12/13/23 1435   PainSc: 0-No pain                       ECOG Performance Status: 2 - Symptomatic, <50% confined to bed  General Appearance:  alert, cooperative, no apparent distress and appears stated age   Neurologic/Psychiatric: A&O x 3, gait steady, appropriate affect, strength 5/5 in all muscle groups   HEENT:  Normocephalic, without obvious abnormality, mucous membranes moist   Neck: Supple, symmetrical, trachea midline, no adenopathy;  No thyromegaly, masses, or tenderness   Lungs:   Clear to auscultation bilaterally; respirations regular, even, and unlabored bilaterally   Heart:  Regular rate and rhythm, no murmurs appreciated   Abdomen:   Soft, non-tender, non-distended, and no organomegaly   Lymph nodes: No cervical, supraclavicular, inguinal or axillary adenopathy noted   Extremities:  +2 bilateral lower extremities edema    Skin: No rashes, ulcers, or suspicious lesions noted     No results displayed because visit has over 200 results.           XR Chest 1 View    Result Date: 11/29/2023  Narrative: XR CHEST 1 VW Date of Exam: 11/29/2023 2:03 PM EST Indication: Weak/Dizzy/AMS triage protocol Comparison: 11/23/2022 Findings: Previously seen pulmonary vascular congestion and bibasilar atelectasis has resolved. There is stable moderate cardiomegaly. Pulmonary vessels appear within normal limits on the current exam. Lung fields are clear. There are no effusions.    Impression: Impression: No acute process. Stable cardiomegaly. Electronically Signed: Bella Chapman MD  11/29/2023 2:24 PM EST  Workstation ID: [id number]    CT Head Without Contrast    Result Date: 11/28/2023  Narrative: FINAL REPORT " TECHNIQUE: Routine axial images through the head were obtained without contrast. CLINICAL HISTORY: Positive LOC with fall FINDINGS: There is  mild generalized cerebral atrophy.  The ventricles are dilated, but proportionate to the degree of atrophy.  There is no evidence of hemorrhage, mass or other acute abnormality.  No acute osseous or sinus abnormality is seen. There is no skull fracture.     Impression: No acute intracranial abnormality. Authenticated and Electronically Signed by Willis Quiñones M.D. on 11/28/2023 07:42:04 PM     ASSESSMENT: The patient is a very pleasant 88 y.o. male  with diffuse large B-cell lymphoma      PLAN:    1.  Diffuse large B-cell lymphoma stage Ia:  A.  I will do 3 months follow-up scans which should be due December 2023.  This was ordered for prior to return.    2.  Prostate cancer:  A.  I will continue the patient on Lupron monthly.  B. I will continue the patient on Xtandi 120 mg daily.  We will consider increasing the dose to full dose of 160 mg daily if he is doing well.  C.  I will monitor his PSA prior to each infusion.  I we will follow-up on his PSA from today.  His PSA peaked at 52 prior to starting treatment.  I reassured him that his PSA went down to 1.2 on November 1, 2023.    3.  Bone metastases:  A.  I will continue the patient on Xgeva 120 mg subcu every 4 weeks.  B.  I will switch him to every 3 months after the first year.  Will be due September 2024.  C.  I will continue the patient on calcium and vitamin D daily.    4.  Right-sided pulmonary embolism:  A. The patient has completed 1 year of treatment.  His repeated CT PE protocol in June 2022 did not reveal any evidence of recurrent PEs.    5.  Hypercholesteremia:  A.  The patient continue pravastatin daily.    6.  Type 2 diabetes:  A.  The patient will continue [place] and Trulicity.    FOLLOW UP: 1 month with Eligard treatment as well as blood work.    Shannan Ramon MD  12/13/2023

## 2023-12-14 ENCOUNTER — READMISSION MANAGEMENT (OUTPATIENT)
Dept: CALL CENTER | Facility: HOSPITAL | Age: 88
End: 2023-12-14
Payer: MEDICARE

## 2023-12-14 ENCOUNTER — HOME CARE VISIT (OUTPATIENT)
Dept: HOME HEALTH SERVICES | Facility: HOME HEALTHCARE | Age: 88
End: 2023-12-14
Payer: MEDICARE

## 2023-12-14 NOTE — HOME HEALTH
HHOT evaluation in home 12/12/23    Pt is an 89 yo male living in a senior living home on second floor, in single bed apt.  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 Presented to [facility] ED for weakness. Found recently to be positive for Covid-19. PPE worn in apt during HHOT evaluation. Discharged home on 12.4.23. Pt and floor staff reports pt conts to test positive for Covid 19.  OT reached out to PT to notify. Pt is observed in a transfer, orientation and safety assessed.  Pt has had mx falls.  Currently using a FWW, apr presents with obstacles (clutter) pt must navigate around, posing and addtl fall risk.  Pt bathroom is setup with DME but pt reports having a fear of falling in shower unit.  Pt reports performing meal prep, dressing (SN provides wound care to serveral areas, covered by dressing during eval), Vitals were assessed and pt education was provided.      OT freq 1x4 Endurance, ADL retraining, HEP, vitals, safety education.  Pt is agreeable.    Goal to return to [facility]    Code: CPR

## 2023-12-14 NOTE — OUTREACH NOTE
COPD/PN Week 2 Survey      Flowsheet Row Responses   [facility] patient discharged from? [facility]   Does the patient have one of the following disease processes/diagnoses(primary or secondary)? Pneumonia   Week 2 attempt successful? Yes   Call start time 0904   Call end time 0907   Discharge diagnosis Upper respiratory tract infection & PNA  due to COVID-19 virus   Is patient permission given to speak with other caregiver? Yes   List who call center can speak with Susana Dietz Power of    Person spoke with today (if not patient) and relationship Susana Dietz Power of    Has the patient kept scheduled appointments due by today? Yes  [Onc apt on 12/13/23]   Comments PCP apt on 12/15/23   Home health comments HH still visits   What is the patient's perception of their health status since discharge? Improving   Week 2 call completed? Yes   Graduated Yes   Graduated/Revoked comments Improving per Susana   Call end time 0907            Luzma DOWNEY - Registered Nurse

## 2023-12-15 ENCOUNTER — OFFICE VISIT (OUTPATIENT)
Dept: INTERNAL MEDICINE | Facility: CLINIC | Age: 88
End: 2023-12-15
Payer: MEDICARE

## 2023-12-15 ENCOUNTER — HOME CARE VISIT (OUTPATIENT)
Dept: HOME HEALTH SERVICES | Facility: HOME HEALTHCARE | Age: 88
End: 2023-12-15
Payer: MEDICARE

## 2023-12-15 VITALS
RESPIRATION RATE: 16 BRPM | HEIGHT: 70 IN | BODY MASS INDEX: 26.48 KG/M2 | TEMPERATURE: 98.4 F | WEIGHT: 185 LBS | HEART RATE: 75 BPM | OXYGEN SATURATION: 95 % | DIASTOLIC BLOOD PRESSURE: 74 MMHG | SYSTOLIC BLOOD PRESSURE: 132 MMHG

## 2023-12-15 DIAGNOSIS — U07.1 PNEUMONIA DUE TO COVID-19 VIRUS: Primary | ICD-10-CM

## 2023-12-15 DIAGNOSIS — T14.8XXA ABRASION: ICD-10-CM

## 2023-12-15 DIAGNOSIS — J06.9 UPPER RESPIRATORY TRACT INFECTION DUE TO COVID-19 VIRUS: ICD-10-CM

## 2023-12-15 DIAGNOSIS — J12.82 PNEUMONIA DUE TO COVID-19 VIRUS: Primary | ICD-10-CM

## 2023-12-15 DIAGNOSIS — U07.1 UPPER RESPIRATORY TRACT INFECTION DUE TO COVID-19 VIRUS: ICD-10-CM

## 2023-12-15 PROCEDURE — 1111F DSCHRG MED/CURRENT MED MERGE: CPT | Performed by: FAMILY MEDICINE

## 2023-12-15 PROCEDURE — 1159F MED LIST DOCD IN RCRD: CPT | Performed by: FAMILY MEDICINE

## 2023-12-15 PROCEDURE — 99495 TRANSJ CARE MGMT MOD F2F 14D: CPT | Performed by: FAMILY MEDICINE

## 2023-12-15 PROCEDURE — 1160F RVW MEDS BY RX/DR IN RCRD: CPT | Performed by: FAMILY MEDICINE

## 2023-12-15 NOTE — ASSESSMENT & PLAN NOTE
Resolved. The patient has very few residual symptoms including mild shortness of breath and trace cough. Lungs are completely clear to auscultation.We did review laboratory studies that were recently collected yesterday by oncology. Advise he is mildly anemic but labs as a whole seemed to have improved.

## 2023-12-15 NOTE — ASSESSMENT & PLAN NOTE
Two abrasions noted to the left shin and one to the left elbow appear to be healing very well. He continues to take clindamycin and has home health coming to dress the wounds.

## 2023-12-15 NOTE — PROGRESS NOTES
Transitional Care Follow Up Visit  Subjective     Blane Dietz is a 88 y.o. male who presents for a transitional care management visit.    Within 48 business hours after discharge our office contacted him via telephone to coordinate his care and needs.     Blane Dietz is an 88-year-old male with date of birth 1935. He  presents today for a hospital follow-up. He was recently hospitalized for upper respiratory tract infection and pneumonia secondary to COVID-19 virus. He is accompanied by an adult female.       I reviewed and discussed the details of that call along with the discharge summary, hospital problems, inpatient lab results, inpatient diagnostic studies, and consultation reports with Blane.     Current outpatient and discharge medications have been reconciled for the patient.  Reviewed by: Romelia Red DO          12/4/2023     5:48 PM   Date of TCM Phone Call   [facility]   Date of Admission 11/29/2023   Date of Discharge 12/4/2023   Discharge Disposition Home-Health Care Svc     Risk for Readmission (LACE) No data recorded      History of Present Illness   Course During Hospital Stay:  Blane presents to the ED at the recommendation of hsi oncologist for weakness and a fall at home.  He was noted to be COVID 19 positive.  He was treated with supportive care.  CT of the head was negative.  Creatinine was reportedly at baseline.  He was started on clindamycin for infected abrasions to LLE and left elbow.      He has been feeling better since he got out of the hospital today. He is feeling much better compared to when he was in the hospital. His breathing was labored coming into the office, but it has calmed down now. He is not coughing much. He brings up phlegm. He denies any wheezing. He denies any chest pain. He denies any vomiting or diarrhea. He is a little nauseous today. He reports he never had any COVID-19 symptoms. He is still taking clindamycin.    The patient states that  he has not tested his blood glucose lately. He does not normally check it regularly. It was 140mg/dL the last time. He denies any vomiting or diarrhea.    He has a couple of abrasions on his shin. Home health comes by to change them. They are doing better.    He is still taking Xtandi 120 mg daily for his prostate cancer. He started taking Xtandi again as soon as he got out of the hospital. He wonders if he should continue taking it. He saw Dr. Ramon  yesterday. He received Lupron injections yesterday for prostate cancer.         The following portions of the patient's history were reviewed and updated as appropriate: allergies, current medications, past family history, past medical history, past social history, past surgical history, and problem list.    Review of Systems   Constitutional:  Positive for fatigue. Negative for chills and fever.   Respiratory:  Positive for shortness of breath.    Cardiovascular:  Negative for chest pain.   Gastrointestinal:  Negative for diarrhea, nausea and vomiting.   Skin:  Positive for wound.       Objective   Physical Exam  Constitutional:       General: He is not in acute distress.     Appearance: He is well-developed.   HENT:      Head: Normocephalic and atraumatic.      Right Ear: External ear normal.      Left Ear: Tympanic membrane and external ear normal.      Nose: Nose normal.   Eyes:      Extraocular Movements: Extraocular movements intact.      Conjunctiva/sclera: Conjunctivae normal.   Neck:      Thyroid: No thyromegaly.      Vascular: No JVD.   Cardiovascular:      Rate and Rhythm: Normal rate and regular rhythm.      Heart sounds: Normal heart sounds. No murmur heard.  Pulmonary:      Effort: Pulmonary effort is normal. No respiratory distress.      Breath sounds: Normal breath sounds. No wheezing.   Abdominal:      General: Bowel sounds are normal. There is no distension.      Palpations: Abdomen is soft.      Tenderness: There is no abdominal tenderness.    Musculoskeletal:      Right lower leg: No edema.      Left lower leg: No edema.      Comments: He does ambulates slowly with a cane.   Skin:     General: Skin is warm and dry.      Comments:  The patient does have small healing abrasions noted to his left shin and left elbow.    Neurological:      Mental Status: He is alert and oriented to person, place, and time.      Cranial Nerves: No cranial nerve deficit.      Coordination: Coordination normal.   Psychiatric:         Behavior: Behavior normal.         Thought Content: Thought content normal.         Judgment: Judgment normal.         Assessment & Plan   Problems Addressed this Visit       Pneumonia due to COVID-19 virus - Primary     Resolved. The patient has very few residual symptoms including mild shortness of breath and trace cough. Lungs are completely clear to auscultation.We did review laboratory studies that were recently collected yesterday by oncology. Advise he is mildly anemic but labs as a whole seemed to have improved.         Abrasion     Two abrasions noted to the left shin and one to the left elbow appear to be healing very well. He continues to take clindamycin and has home health coming to dress the wounds.          Other Visit Diagnoses       Upper respiratory tract infection due to COVID-19 virus              Diagnoses         Codes Comments    Pneumonia due to COVID-19 virus    -  Primary ICD-10-CM: U07.1, J12.82  ICD-9-CM: 480.8, 079.89     Upper respiratory tract infection due to COVID-19 virus     ICD-10-CM: U07.1, J06.9  ICD-9-CM: 465.9, 079.89     Abrasion     ICD-10-CM: T14.8XXA  ICD-9-CM: 919.0                      Transcribed from ambient dictation for Romelia Red DO by Angela Jama.  12/15/23   16:53 EST    Patient or patient representative verbalized consent to the visit recording.  I have personally performed the services described in this document as transcribed by the above individual, and it is both accurate and  complete.  Romelia Red,   1/1/2024  16:31 EST

## 2023-12-17 VITALS
DIASTOLIC BLOOD PRESSURE: 78 MMHG | OXYGEN SATURATION: 94 % | TEMPERATURE: 97 F | SYSTOLIC BLOOD PRESSURE: 136 MMHG | RESPIRATION RATE: 20 BRPM | HEART RATE: 92 BPM

## 2023-12-17 VITALS
SYSTOLIC BLOOD PRESSURE: 136 MMHG | TEMPERATURE: 97 F | DIASTOLIC BLOOD PRESSURE: 78 MMHG | RESPIRATION RATE: 16 BRPM | OXYGEN SATURATION: 94 %

## 2023-12-18 NOTE — HOME HEALTH
Routine Visit Note: LPN    Skill/education provided: Wound care LLE and LUE. Instructed on Covid-19.  Medication education. Instructed on diabetic foot care.     Patient/caregiver response: Patisent verbalized understanding of instructions. Patient tolerated wound care well.      Plan for next visit: Education regarding Covid-19. Wound care to LLE and LUE      Other pertinent info: N/A

## 2023-12-19 ENCOUNTER — HOME CARE VISIT (OUTPATIENT)
Dept: HOME HEALTH SERVICES | Facility: HOME HEALTHCARE | Age: 88
End: 2023-12-19
Payer: MEDICARE

## 2023-12-19 PROCEDURE — G0152 HHCP-SERV OF OT,EA 15 MIN: HCPCS

## 2023-12-20 VITALS
RESPIRATION RATE: 17 BRPM | OXYGEN SATURATION: 98 % | DIASTOLIC BLOOD PRESSURE: 90 MMHG | SYSTOLIC BLOOD PRESSURE: 125 MMHG | HEART RATE: 88 BPM

## 2023-12-20 NOTE — HOME HEALTH
Presents in bed with CPAP on.  Pt indep transfers from bed, chair, and commode.  OBS AMB with walker in apt, w/o LOB.  Pt is mod indep toileting, hyigene. Pt reports no new falls, no PCP change, no med changes.  Pt reports washing off in shower after toileting. Transfers and AMB observed, no LOB.  Pt having some urgency issues.  Pt at risk of falls.  Vitals assessed.  Cont OT freq and POC.  Pt participated in TE today and did well.

## 2023-12-21 ENCOUNTER — HOME CARE VISIT (OUTPATIENT)
Dept: HOME HEALTH SERVICES | Facility: HOME HEALTHCARE | Age: 88
End: 2023-12-21
Payer: MEDICARE

## 2023-12-21 PROCEDURE — G0299 HHS/HOSPICE OF RN EA 15 MIN: HCPCS

## 2023-12-21 NOTE — HOME HEALTH
Routine Visit Note: LPN    Skilled/education provided: Wound care LLE and LUE. Instructed on Covid-19.  Medication education. Instructed on diabetic foot care.     Patient/caregiver response: Patisent verbalized understanding of instructions. Patient tolerated wound care well.      Plan for next visit: Education regarding Covid-19. Wound care to LLE and LUE      Other pertinent info: N/A

## 2023-12-22 ENCOUNTER — HOME CARE VISIT (OUTPATIENT)
Dept: HOME HEALTH SERVICES | Facility: HOME HEALTHCARE | Age: 88
End: 2023-12-22
Payer: MEDICARE

## 2023-12-22 VITALS
OXYGEN SATURATION: 99 % | DIASTOLIC BLOOD PRESSURE: 70 MMHG | SYSTOLIC BLOOD PRESSURE: 116 MMHG | TEMPERATURE: 96.9 F | HEART RATE: 91 BPM | RESPIRATION RATE: 17 BRPM

## 2023-12-22 PROCEDURE — G0157 HHC PT ASSISTANT EA 15: HCPCS

## 2023-12-23 NOTE — HOME HEALTH
"Routine Visit Note: Greeted at door by patient, who was first seen ambulating with rolling walker, showing no apparent signs of distress. Patient states that he is feeling \"pretty tired\" and reports no compliance with an established HEP.      Skill/education provided: Instructed gait training and therapeutic exercise today. Educated patient on HEP and energy conservation strategies.     Patient/caregiver response: Patient tolerated all treatment well today, with no signs of distress, excessive fatigue, or pain. Patient verbalizes understanding of all provided education today and appears on track to meet his goals / exhibits improvement overall today. SpO2 97% 91 bpm following gait training    Plan for next visit: Patient would benefit from continued skilled PT to address deficits in BLE strength, balance, endurance, gait, and to improve overall functional mobility. Progress exercise and initiate dynamic balance exercise as tolerated. Review and progress HEP/reinforce education as needed."

## 2023-12-26 ENCOUNTER — HOME CARE VISIT (OUTPATIENT)
Dept: HOME HEALTH SERVICES | Facility: HOME HEALTHCARE | Age: 88
End: 2023-12-26
Payer: MEDICARE

## 2023-12-26 VITALS — SYSTOLIC BLOOD PRESSURE: 94 MMHG | HEART RATE: 88 BPM | OXYGEN SATURATION: 94 % | DIASTOLIC BLOOD PRESSURE: 62 MMHG

## 2023-12-26 PROCEDURE — G0299 HHS/HOSPICE OF RN EA 15 MIN: HCPCS

## 2023-12-26 PROCEDURE — G0152 HHCP-SERV OF OT,EA 15 MIN: HCPCS

## 2023-12-26 NOTE — HOME HEALTH
Routine Visit Note: LPN    Skill/education provided: Wound care LLE. Instructed on Covid-19.  Medication education. Instructed on diabetic foot care.     Patient/caregiver response: Patisent verbalized understanding of instructions. Patient tolerated wound care well.      Plan for next visit: Education regarding Covid-19. Wound care to LLE        Other pertinent info: N/A

## 2023-12-27 ENCOUNTER — SPECIALTY PHARMACY (OUTPATIENT)
Dept: ONCOLOGY | Facility: HOSPITAL | Age: 88
End: 2023-12-27
Payer: MEDICARE

## 2023-12-27 ENCOUNTER — HOME CARE VISIT (OUTPATIENT)
Dept: HOME HEALTH SERVICES | Facility: HOME HEALTHCARE | Age: 88
End: 2023-12-27
Payer: MEDICARE

## 2023-12-27 PROCEDURE — G0157 HHC PT ASSISTANT EA 15: HCPCS

## 2023-12-27 NOTE — PROGRESS NOTES
"Specialty Pharmacy Refill Coordination Note     Blane \"CHEO\" is a 88 y.o. male contacted today regarding refills of  enzalutamide 120mg PO QD of specialty medication(s).      Specialty medication(s) and dose(s) confirmed: yes    Refill Questions      Flowsheet Row Most Recent Value   Changes to allergies? No   Changes to medications? No   New conditions or infections since last clinic visit No   Unplanned office visit, urgent care, ED, or hospital admission in the last 4 weeks  No   How does patient/caregiver feel medication is working? Good   Financial problems or insurance changes  No   Since the previous refill, were any specialty medication doses or scheduled injections missed or delayed?  No   Does this patient require a clinical escalation to a pharmacist? No            Delivery Questions      Flowsheet Row Most Recent Value   Delivery method [employer]   Delivery address verified with patient/caregiver? Yes   Delivery address Home   Number of medications in delivery 1   Medication(s) being filled and delivered Enzalutamide   Doses left of specialty medications 3 days left   Copay verified? Yes   Copay amount $110   Copay form of payment Credit/debit on file                   Follow-up: 30  day(s)     Elyse Walton, Pharmacy Technician  Specialty Pharmacy Technician        "

## 2023-12-28 VITALS
OXYGEN SATURATION: 94 % | RESPIRATION RATE: 16 BRPM | SYSTOLIC BLOOD PRESSURE: 120 MMHG | TEMPERATURE: 97 F | HEART RATE: 92 BPM | OXYGEN SATURATION: 96 % | DIASTOLIC BLOOD PRESSURE: 71 MMHG | TEMPERATURE: 96.9 F | HEART RATE: 85 BPM | SYSTOLIC BLOOD PRESSURE: 114 MMHG | RESPIRATION RATE: 16 BRPM | DIASTOLIC BLOOD PRESSURE: 72 MMHG

## 2023-12-28 VITALS
OXYGEN SATURATION: 96 % | RESPIRATION RATE: 16 BRPM | TEMPERATURE: 96.1 F | HEART RATE: 80 BPM | DIASTOLIC BLOOD PRESSURE: 68 MMHG | SYSTOLIC BLOOD PRESSURE: 110 MMHG

## 2023-12-28 NOTE — HOME HEALTH
Discharge Summary/Summary of Care Provided: Pt provided, HEP, safety assessment, vital checks, DME exploration, pt education. 12/26/23.  No recent falls, wound healing (SN remains in for wound care).  MD notified. No changes to insurance or PCP reported at [place]. Pt has necessary DME in home.    Current level of functional ability: mod indep  Living arrangements: assisted living, single bed apt  Progress towards goals and/or Were all goals met? goals met to max potential  If not all goals met, barriers that prevented patient from meeting goals: none  SDOH concerns (i.e. Caregiver availability, social isolation, environment, income, transportation access, food insecurity etc.)none    HHOT evaluation in home 12/12/23      Pt is an 89 yo male living in a senior living home on second floor, in single bed apt.  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 Presented to [facility] ED for weakness. Found recently to be positive for Covid-19. PPE worn in apt during HHOT evaluation. Discharged home on 12.4.23. Pt and floor staff reports pt conts to test positive for Covid 19.  OT reached out to PT to notify. Pt is observed in a transfer, orientation and safety assessed.  Pt has had mx falls.  Currently using a FWW, apr presents with obstacles (clutter) pt must navigate around, posing and addtl fall risk.  Pt bathroom is setup with DME but pt reports having a fear of falling in shower unit.  Pt reports performing meal prep, dressing (SN provides wound care to serveral areas, covered by dressing during eval), Vitals were assessed and pt education was provided.        OT freq 1x4 Endurance, ADL retraining, HEP, vitals, safety education.  Pt is agreeable.         Goal to return to [facility]         Code: CPR

## 2023-12-28 NOTE — HOME HEALTH
"Routine Visit Note: Called into home by patient, who was first seen sitting in recliner with no apparent signs of distress. Patient states that he is feeling \"good today\" and reports regular compliance with his HEP as prescribed.      Skill/education provided: Instructed gait training, therapeutic exercise, and balance training today. Educated patient on energy conservation strategies, fall prevention, and reviewed/progressed HEP.     Patient/caregiver response: Patient tolerated all treatment well today, with no signs of distress, excessive fatigue, or pain. Patient verbalizes understanding of all provided education today and appears on track to meet his goals / exhibits improvement overall today, ambulating greater distances and progressing to standing therapeutic exercises.     Plan for next visit: Patient would benefit from continued skilled PT to address deficits in strength, balance, endurance, and overall functional mobility. Continue to progress with standing exercises and progress to dynamic balance exercise as tolerated."

## 2024-01-04 ENCOUNTER — HOME CARE VISIT (OUTPATIENT)
Dept: HOME HEALTH SERVICES | Facility: HOME HEALTHCARE | Age: 89
End: 2024-01-04
Payer: MEDICARE

## 2024-01-04 VITALS
TEMPERATURE: 96.1 F | RESPIRATION RATE: 16 BRPM | DIASTOLIC BLOOD PRESSURE: 70 MMHG | SYSTOLIC BLOOD PRESSURE: 134 MMHG | HEART RATE: 77 BPM

## 2024-01-04 PROCEDURE — G0299 HHS/HOSPICE OF RN EA 15 MIN: HCPCS

## 2024-01-09 VITALS
SYSTOLIC BLOOD PRESSURE: 134 MMHG | RESPIRATION RATE: 16 BRPM | TEMPERATURE: 96.1 F | OXYGEN SATURATION: 97 % | DIASTOLIC BLOOD PRESSURE: 70 MMHG | HEART RATE: 77 BPM

## 2024-01-09 NOTE — PROGRESS NOTES
DATE OF VISIT: 1/10/2024    REASON FOR VISIT: Followup for: 1.  Diffuse large B-cell lymphoma 2.  Prostate cancer     PROBLEM LIST:  1. Prostate cancer, initially presented as [id number] status post radical  prostatectomy 2000.  A.  Patient is on Lupron plus Prolia  B.  Tried to add apalutamide second rising PSA patient declined.  C.  Whole-body PSMA PET scan done September 2023 revealed 2 separate areas of uptake in the skeleton at the L1 left transverse process and close to cartilage junction of the right third anterior rib.  D.  We will start Eligard with Xtandi September 20, 2023  2.  Right-sided pulmonary embolisms:  A.  Currently on Eliquis twice a day  3.  Type 2 diabetes  4. Hhypertension  5. Hypercholesterolemia.  6.  Right subsegmental PE:  A. Started on Eliquis twice a day March 2021  7.  Osteopenia  8.  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  Status post right nephrectomy December 7, 2021  B.  Started adjuvant chemotherapy R-CHOP January 14, 2022, status post 3 cycles completed February 23, 2022      HISTORY OF PRESENT ILLNESS: The patient is a very pleasant 88 y.o. male  with past medical history significant for diffuse large B-cell lymphoma right kidney diagnosed December 2021.  Patient status post 3 cycles of chemotherapy with R-CHOP.  Repeat PET scan showed no evidence of residual disease. The patient is here today for scheduled follow-up visit.    SUBJECTIVE: Geovanny is here today with his assistant.  He is anxious about his PSA result.  He has been compliant with his treatment.  He is walking with walker assistance.    Past History:  Medical History: has a past medical history of Aortic stenosis, Arthritis, Bilateral impacted cerumen (11/23/2016), Bronchitis, CHF (congestive heart failure), Chronic gout of right foot (06/13/2016), COVID-19 vaccine series completed (05/12/2021), Depression, Diabetes mellitus, Diverticulitis, Exogenous obesity, Gout, Heart murmur, History of  vitamin D deficiency, Hypercholesteremia (08/11/2016), Hyperlipidemia, Hypertension, Kidney lesion, Malignant neoplasm of prostate, Morbid obesity (08/11/2016), Pneumonia (05/12/2021), Primary osteoarthritis involving multiple joints (06/13/2016), Pulmonary embolism, Sleep apnea (05/12/2021), Uncontrolled type 2 diabetes mellitus with hyperglycemia (06/13/2016), and Vertigo.   Surgical History: has a past surgical history that includes Colostomy (1999); Other surgical history; Exploratory laparotomy; Colonoscopy; Revision Colostomy; Prostate surgery; Prostatectomy (2000); Colon surgery; Tonsillectomy; Skin cancer excision; Lipoma Excision; Mohs surgery; Cardiac valve replacement (05/12/2021); nephroureterectomy (Right, 12/7/2021); and Cystoscopy (N/A, 12/7/2021).   Family History: family history includes Breast cancer in an other family member; Cancer in his mother and another family member; Diabetes in his mother and another family member; Heart disease in his father; Hypertension in an other family member; Lung cancer in his mother; Migraines in an other family member; Obesity in an other family member.   Social History: reports that he has never smoked. He has never been exposed to tobacco smoke. He has never used smokeless tobacco. He reports that he does not drink alcohol and does not use drugs.    (Not in a hospital admission)     Allergies: Ampicillin, Medrol [methylprednisolone], Myrbetriq [mirabegron], Penicillins, Bee venom, and Melatonin     Review of Systems   Constitutional:  Positive for fatigue.   Respiratory:  Positive for shortness of breath.    Gastrointestinal:  Positive for nausea.   Musculoskeletal:  Positive for arthralgias.         Current Outpatient Medications:     allopurinol (ZYLOPRIM) 100 MG tablet, Take 1 tablet by mouth Daily., Disp: 90 tablet, Rfl: 3    amiodarone (PACERONE) 200 MG tablet, Take 1 tablet by mouth Daily., Disp: , Rfl:     benzonatate (TESSALON) 100 MG capsule, Take 1  capsule by mouth 3 (Three) Times a Day As Needed for Cough. (Patient not taking: Reported on 12/15/2023), Disp: 30 capsule, Rfl: 0    bumetanide (BUMEX) 1 MG tablet, Taking 1  tablet by mouth every other day for 30 days  Indications: Cardiac Failure, Edema, Disp: , Rfl:     Calcium Carb-Cholecalciferol 600-5 MG-MCG tablet, Take 1 tablet by mouth Daily., Disp: 30 tablet, Rfl: 3    Dulaglutide (Trulicity) 1.5 MG/0.5ML solution pen-injector, Inject 1.5 mg under the skin into the appropriate area as directed 1 (One) Time Per Week. Takes on Wednesdays, Disp: 7 mL, Rfl: 2    empagliflozin (Jardiance) 25 MG tablet tablet, Take 1 tablet by mouth Daily., Disp: 90 tablet, Rfl: 3    Entresto 24-26 MG tablet, TAKE ONE TABLET BY MOUTH EVERY 12 HOURS, Disp: 180 tablet, Rfl: 3    enzalutamide (XTANDI) 40 MG tablet tablet, Take 3 tablets by mouth Daily., Disp: 90 tablet, Rfl: 11    fluticasone (FLONASE) 50 MCG/ACT nasal spray, 2 sprays into the nostril(s) as directed by provider Daily. 2 puffs each nostril (Patient taking differently: 2 sprays into the nostril(s) as directed by provider Daily As Needed for Rhinitis or Allergies.), Disp: 1 bottle, Rfl: 0    ketoconazole (NIZORAL) 2 % shampoo, Apply 1 application  topically to the appropriate area as directed 2 (Two) Times a Week. Indications: Tinea Versicolor, Disp: , Rfl:     Lantus 100 UNIT/ML injection, Inject up to 50 units daily subcutaneously (Patient taking differently: Inject up to 20 units daily subcutaneously), Disp: 20 mL, Rfl: 5    metoprolol succinate XL (TOPROL-XL) 25 MG 24 hr tablet, Take 0.5 tablets by mouth Daily for 360 days., Disp: 45 tablet, Rfl: 3    multivitamin with minerals tablet tablet, Take 1 tablet by mouth Daily. AREDS 2  Indications: vitamin deficiency, Disp: , Rfl:     pravastatin (PRAVACHOL) 40 MG tablet, TAKE ONE TABLET BY MOUTH EVERY NIGHT AT BEDTIME, Disp: 90 tablet, Rfl: 3    PHYSICAL EXAMINATION:   There were no vitals taken for this visit.    There were no vitals filed for this visit.                      ECOG Performance Status: 2 - Symptomatic, <50% confined to bed  General Appearance:  alert, cooperative, no apparent distress and appears stated age   Neurologic/Psychiatric: A&O x 3, gait steady, appropriate affect, strength 5/5 in all muscle groups   HEENT:  Normocephalic, without obvious abnormality, mucous membranes moist   Neck: Supple, symmetrical, trachea midline, no adenopathy;  No thyromegaly, masses, or tenderness   Lungs:   Clear to auscultation bilaterally; respirations regular, even, and unlabored bilaterally   Heart:  Regular rate and rhythm, no murmurs appreciated   Abdomen:   Soft, non-tender, non-distended, and no organomegaly   Lymph nodes: No cervical, supraclavicular, inguinal or axillary adenopathy noted   Extremities:  +2 bilateral lower extremities edema    Skin: No rashes, ulcers, or suspicious lesions noted     No visits with results within 2 Week(s) from this visit.   Latest known visit with results is:   Infusion on 12/13/2023   Component Date Value Ref Range Status    PSA 12/13/2023 0.367  0.000 - 4.000 ng/mL Final    Glucose 12/13/2023 253 (H)  65 - 99 mg/dL Final    Glucose >180, Hemoglobin A1C recommended.    BUN 12/13/2023 23  8 - 23 mg/dL Final    Creatinine 12/13/2023 1.40 (H)  0.76 - 1.27 mg/dL Final    Sodium 12/13/2023 139  136 - 145 mmol/L Final    Potassium 12/13/2023 5.6 (H)  3.5 - 5.2 mmol/L Final    Chloride 12/13/2023 107  98 - 107 mmol/L Final    CO2 12/13/2023 25.0  22.0 - 29.0 mmol/L Final    Calcium 12/13/2023 9.0  8.6 - 10.5 mg/dL Final    Total Protein 12/13/2023 6.5  6.0 - 8.5 g/dL Final    Albumin 12/13/2023 3.6  3.5 - 5.2 g/dL Final    ALT (SGPT) 12/13/2023 16  1 - 41 U/L Final    AST (SGOT) 12/13/2023 23  1 - 40 U/L Final    Alkaline Phosphatase 12/13/2023 190 (H)  39 - 117 U/L Final    Total Bilirubin 12/13/2023 0.7  0.0 - 1.2 mg/dL Final    Globulin 12/13/2023 2.9  gm/dL Final    A/G Ratio  12/13/2023 1.2  g/dL Final    BUN/Creatinine Ratio 12/13/2023 16.4  7.0 - 25.0 Final    Anion Gap 12/13/2023 7.0  5.0 - 15.0 mmol/L Final    eGFR 12/13/2023 48.9 (L)  >60.0 mL/min/1.73 Final    Magnesium 12/13/2023 2.0  1.6 - 2.4 mg/dL Final    Phosphorus 12/13/2023 2.6  2.5 - 4.5 mg/dL Final    WBC 12/13/2023 7.67  3.40 - 10.80 10*3/mm3 Final    RBC 12/13/2023 4.27  4.14 - 5.80 10*6/mm3 Final    Hemoglobin 12/13/2023 12.8 (L)  13.0 - 17.7 g/dL Final    Hematocrit 12/13/2023 39.9  37.5 - 51.0 % Final    MCV 12/13/2023 93.4  79.0 - 97.0 fL Final    MCH 12/13/2023 30.0  26.6 - 33.0 pg Final    MCHC 12/13/2023 32.1  31.5 - 35.7 g/dL Final    RDW 12/13/2023 14.9  12.3 - 15.4 % Final    RDW-SD 12/13/2023 50.8  37.0 - 54.0 fl Final    MPV 12/13/2023 10.2  6.0 - 12.0 fL Final    Platelets 12/13/2023 128 (L)  140 - 450 10*3/mm3 Final    Neutrophil % 12/13/2023 65.5  42.7 - 76.0 % Final    Lymphocyte % 12/13/2023 11.1 (L)  19.6 - 45.3 % Final    Monocyte % 12/13/2023 10.6  5.0 - 12.0 % Final    Eosinophil % 12/13/2023 7.7 (H)  0.3 - 6.2 % Final    Basophil % 12/13/2023 0.8  0.0 - 1.5 % Final    Immature Grans % 12/13/2023 4.3 (H)  0.0 - 0.5 % Final    Neutrophils, Absolute 12/13/2023 5.03  1.70 - 7.00 10*3/mm3 Final    Lymphocytes, Absolute 12/13/2023 0.85  0.70 - 3.10 10*3/mm3 Final    Monocytes, Absolute 12/13/2023 0.81  0.10 - 0.90 10*3/mm3 Final    Eosinophils, Absolute 12/13/2023 0.59 (H)  0.00 - 0.40 10*3/mm3 Final    Basophils, Absolute 12/13/2023 0.06  0.00 - 0.20 10*3/mm3 Final    Immature Grans, Absolute 12/13/2023 0.33 (H)  0.00 - 0.05 10*3/mm3 Final    nRBC 12/13/2023 0.0  0.0 - 0.2 /100 WBC Final        No results found.    ASSESSMENT: The patient is a very pleasant 88 y.o. male  with diffuse large B-cell lymphoma      PLAN:    1.  Diffuse large B-cell lymphoma stage Ia:  A.  I will order scans prior to return.  His last PET scan done September 7, 2023    2.  Prostate cancer:  A.  I will continue the patient on  Lupron monthly.  B. I will continue the patient on Xtandi 120 mg daily.  We will consider increasing the dose to full dose of 160 mg daily if he is doing well.  C.  I did go over the blood work results with the patient from December 13, 2023.  He had mild anemia hemoglobin 12 mild thrombocytopenia 128 normal white blood cells.  His calcium was elevated 1.4.  Potassium elevated 5.6.  PSA continues to decline level of 0.34.    3.  Bone metastases:  A.  I will continue the patient on Xgeva 120 mg subcu every 4 weeks.  B.  I will switch him to every 3 months after the first year.  Will be due September 2024.  C.  I will continue the patient on calcium and vitamin D daily.    4.  Right-sided pulmonary embolism:  A. The patient has completed 1 year of treatment.  His repeated CT PE protocol in June 2022 did not reveal any evidence of recurrent PEs.    5.  Hypercholesteremia:  A.  The patient continue pravastatin daily.    6.  Type 2 diabetes:  A.  The patient will continue [place] and Trulicity.    FOLLOW UP: 1 month with Eligard treatment as well as blood work.    Shannan Ramon MD  1/10/2024

## 2024-01-10 ENCOUNTER — INFUSION (OUTPATIENT)
Dept: ONCOLOGY | Facility: HOSPITAL | Age: 89
End: 2024-01-10
Payer: MEDICARE

## 2024-01-10 ENCOUNTER — OFFICE VISIT (OUTPATIENT)
Dept: ONCOLOGY | Facility: CLINIC | Age: 89
End: 2024-01-10
Payer: MEDICARE

## 2024-01-10 VITALS
OXYGEN SATURATION: 95 % | RESPIRATION RATE: 16 BRPM | HEIGHT: 70 IN | WEIGHT: 194 LBS | HEART RATE: 81 BPM | DIASTOLIC BLOOD PRESSURE: 66 MMHG | TEMPERATURE: 97.1 F | BODY MASS INDEX: 27.77 KG/M2 | SYSTOLIC BLOOD PRESSURE: 137 MMHG

## 2024-01-10 DIAGNOSIS — C83.39 DIFFUSE LARGE B-CELL LYMPHOMA OF SOLID ORGAN EXCLUDING SPLEEN: Primary | ICD-10-CM

## 2024-01-10 DIAGNOSIS — C61 PROSTATE CANCER: ICD-10-CM

## 2024-01-10 DIAGNOSIS — C61 PROSTATE CANCER: Primary | ICD-10-CM

## 2024-01-10 LAB
ALBUMIN SERPL-MCNC: 3.8 G/DL (ref 3.5–5.2)
ALBUMIN/GLOB SERPL: 1.3 G/DL
ALP SERPL-CCNC: 122 U/L (ref 39–117)
ALT SERPL W P-5'-P-CCNC: 21 U/L (ref 1–41)
ANION GAP SERPL CALCULATED.3IONS-SCNC: 8.1 MMOL/L (ref 5–15)
AST SERPL-CCNC: 23 U/L (ref 1–40)
BASOPHILS # BLD AUTO: 0.12 10*3/MM3 (ref 0–0.2)
BASOPHILS NFR BLD AUTO: 1.8 % (ref 0–1.5)
BILIRUB SERPL-MCNC: 0.7 MG/DL (ref 0–1.2)
BUN SERPL-MCNC: 23 MG/DL (ref 8–23)
BUN/CREAT SERPL: 16.9 (ref 7–25)
CALCIUM SPEC-SCNC: 8.8 MG/DL (ref 8.6–10.5)
CHLORIDE SERPL-SCNC: 103 MMOL/L (ref 98–107)
CO2 SERPL-SCNC: 27.9 MMOL/L (ref 22–29)
CREAT SERPL-MCNC: 1.36 MG/DL (ref 0.76–1.27)
DEPRECATED RDW RBC AUTO: 51.8 FL (ref 37–54)
EGFRCR SERPLBLD CKD-EPI 2021: 50.7 ML/MIN/1.73
EOSINOPHIL # BLD AUTO: 0.37 10*3/MM3 (ref 0–0.4)
EOSINOPHIL NFR BLD AUTO: 5.6 % (ref 0.3–6.2)
ERYTHROCYTE [DISTWIDTH] IN BLOOD BY AUTOMATED COUNT: 15.1 % (ref 12.3–15.4)
GLOBULIN UR ELPH-MCNC: 2.9 GM/DL
GLUCOSE SERPL-MCNC: 294 MG/DL (ref 65–99)
HCT VFR BLD AUTO: 40.1 % (ref 37.5–51)
HGB BLD-MCNC: 13 G/DL (ref 13–17.7)
IMM GRANULOCYTES # BLD AUTO: 0.1 10*3/MM3 (ref 0–0.05)
IMM GRANULOCYTES NFR BLD AUTO: 1.5 % (ref 0–0.5)
LYMPHOCYTES # BLD AUTO: 1.04 10*3/MM3 (ref 0.7–3.1)
LYMPHOCYTES NFR BLD AUTO: 15.7 % (ref 19.6–45.3)
MAGNESIUM SERPL-MCNC: 2.2 MG/DL (ref 1.6–2.4)
MCH RBC QN AUTO: 30.2 PG (ref 26.6–33)
MCHC RBC AUTO-ENTMCNC: 32.4 G/DL (ref 31.5–35.7)
MCV RBC AUTO: 93 FL (ref 79–97)
MONOCYTES # BLD AUTO: 0.67 10*3/MM3 (ref 0.1–0.9)
MONOCYTES NFR BLD AUTO: 10.1 % (ref 5–12)
NEUTROPHILS NFR BLD AUTO: 4.31 10*3/MM3 (ref 1.7–7)
NEUTROPHILS NFR BLD AUTO: 65.3 % (ref 42.7–76)
NRBC BLD AUTO-RTO: 0 /100 WBC (ref 0–0.2)
PHOSPHATE SERPL-MCNC: 3.6 MG/DL (ref 2.5–4.5)
PLATELET # BLD AUTO: 119 10*3/MM3 (ref 140–450)
PMV BLD AUTO: 10.4 FL (ref 6–12)
POTASSIUM SERPL-SCNC: 4.5 MMOL/L (ref 3.5–5.2)
PROT SERPL-MCNC: 6.7 G/DL (ref 6–8.5)
RBC # BLD AUTO: 4.31 10*6/MM3 (ref 4.14–5.8)
SODIUM SERPL-SCNC: 139 MMOL/L (ref 136–145)
WBC NRBC COR # BLD AUTO: 6.61 10*3/MM3 (ref 3.4–10.8)

## 2024-01-10 PROCEDURE — 85025 COMPLETE CBC W/AUTO DIFF WBC: CPT

## 2024-01-10 PROCEDURE — 36415 COLL VENOUS BLD VENIPUNCTURE: CPT

## 2024-01-10 PROCEDURE — 25010000002 LEUPROLIDE 7.5 MG KIT: Performed by: INTERNAL MEDICINE

## 2024-01-10 PROCEDURE — 96372 THER/PROPH/DIAG INJ SC/IM: CPT

## 2024-01-10 PROCEDURE — 83735 ASSAY OF MAGNESIUM: CPT

## 2024-01-10 PROCEDURE — 84153 ASSAY OF PSA TOTAL: CPT

## 2024-01-10 PROCEDURE — 80053 COMPREHEN METABOLIC PANEL: CPT

## 2024-01-10 PROCEDURE — 96402 CHEMO HORMON ANTINEOPL SQ/IM: CPT

## 2024-01-10 PROCEDURE — 25010000002 DENOSUMAB 120 MG/1.7ML SOLUTION: Performed by: INTERNAL MEDICINE

## 2024-01-10 PROCEDURE — 84100 ASSAY OF PHOSPHORUS: CPT

## 2024-01-10 RX ADMIN — LEUPROLIDE ACETATE 7.5 MG: 7.5 INJECTION, SUSPENSION, EXTENDED RELEASE SUBCUTANEOUS at 15:19

## 2024-01-10 RX ADMIN — DENOSUMAB 120 MG: 120 INJECTION SUBCUTANEOUS at 15:18

## 2024-01-11 ENCOUNTER — SPECIALTY PHARMACY (OUTPATIENT)
Dept: ONCOLOGY | Facility: HOSPITAL | Age: 89
End: 2024-01-11
Payer: MEDICARE

## 2024-01-11 LAB — PSA SERPL-MCNC: 0.27 NG/ML (ref 0–4)

## 2024-01-12 ENCOUNTER — HOME CARE VISIT (OUTPATIENT)
Dept: HOME HEALTH SERVICES | Facility: HOME HEALTHCARE | Age: 89
End: 2024-01-12
Payer: MEDICARE

## 2024-01-13 ENCOUNTER — HOME CARE VISIT (OUTPATIENT)
Dept: HOME HEALTH SERVICES | Facility: HOME HEALTHCARE | Age: 89
End: 2024-01-13
Payer: MEDICARE

## 2024-01-13 PROCEDURE — G0300 HHS/HOSPICE OF LPN EA 15 MIN: HCPCS

## 2024-01-15 ENCOUNTER — HOME CARE VISIT (OUTPATIENT)
Dept: HOME HEALTH SERVICES | Facility: HOME HEALTHCARE | Age: 89
End: 2024-01-15
Payer: MEDICARE

## 2024-01-15 VITALS
RESPIRATION RATE: 16 BRPM | DIASTOLIC BLOOD PRESSURE: 93 MMHG | TEMPERATURE: 97.7 F | SYSTOLIC BLOOD PRESSURE: 146 MMHG | HEART RATE: 81 BPM | OXYGEN SATURATION: 94 %

## 2024-01-15 PROCEDURE — G0151 HHCP-SERV OF PT,EA 15 MIN: HCPCS

## 2024-01-15 NOTE — Clinical Note
Physical Therapy 30 day Reassessment:    Summary of Care: Pt has participated in 4 PT treatment encounters since his initial evaluation on 12/11/23, He declined one PT visit last week. Our focus of care has been pt's Debility associated with Covid-19 infection and falls. PT Interventions: therapeutic activities, gait training, therapeutic exercise, transfer training, neuromuscular re-education, pt/CG education (HEP, symptom management, fall risk reduction), assist with DME as needed for safe mobility.     Progress thus far: Pt has learned a basic HEP for strength and balance improvement. He is transferring and ambulating with less assistance and for greater distances. No falls reported since IE.     Patient/CG Participation: Good    Remaining Problems: generalized muscle weakness, limited activity tolerance secondary to fatigue, pt's sedentary habits, pt reports feeling overwhelmed with multiple appointments but also states he wants to continue PT.    Plan of Care Changes: Based on pt's progress, motivation and Rehab potential, additional Therapy visits are indicated and recommended. Continue PT 1x/wk throughout cert period. Attempt to schedule fewer HH appointments on a single day to minimize pt's stress. Pt agreeable with this plan of care. Schedule next PT RA or DC assessment on 1/29 or 1/30.     Plan of Care Reviewed with: HH care team and David Em MD

## 2024-01-15 NOTE — HOME HEALTH
Routine Visit Note: LPN    Skill/education provided:T/I Medication to include dose, frequency, route and side effect. Instructed on diabetic foot care and keepin ross blood sugar dairy. Instructed on insulin administration.    Patient/caregiver response: Patisent verbalized understanding of instructions.     Plan for next visit: Education on DM and medication       Other pertinent info: Patient states that he had his lupron shot on Wedneday and now takes 3 chemo pills daily

## 2024-01-16 VITALS
DIASTOLIC BLOOD PRESSURE: 62 MMHG | RESPIRATION RATE: 16 BRPM | HEART RATE: 76 BPM | OXYGEN SATURATION: 96 % | SYSTOLIC BLOOD PRESSURE: 134 MMHG | TEMPERATURE: 97.9 F

## 2024-01-18 ENCOUNTER — HOME CARE VISIT (OUTPATIENT)
Dept: HOME HEALTH SERVICES | Facility: HOME HEALTHCARE | Age: 89
End: 2024-01-18
Payer: MEDICARE

## 2024-01-18 VITALS
RESPIRATION RATE: 20 BRPM | TEMPERATURE: 98.8 F | DIASTOLIC BLOOD PRESSURE: 86 MMHG | SYSTOLIC BLOOD PRESSURE: 129 MMHG | OXYGEN SATURATION: 97 % | HEART RATE: 88 BPM

## 2024-01-18 PROCEDURE — G0495 RN CARE TRAIN/EDU IN HH: HCPCS

## 2024-01-18 NOTE — HOME HEALTH
Discharge Decision:  [facility] Disc[facility] D/C.    Plan for discharge: Patient discharged to home/assisted living.    Barriers to discharge: None    Ongoing needs: PT    Any referrals needed: None    Any declines in outcomes: discuss and document reason/Order received to discharge without goals met? All goals met.    Teaching needed prior to discharge: None    Assign DC notice responsibility: PT    Assign OASIS discharge responsibility: PT

## 2024-01-18 NOTE — Clinical Note
Patient discharged to home/assisted living from home health skilled nursing services on 1/18/24 with all goals met.

## 2024-01-24 ENCOUNTER — SPECIALTY PHARMACY (OUTPATIENT)
Dept: ONCOLOGY | Facility: HOSPITAL | Age: 89
End: 2024-01-24
Payer: MEDICARE

## 2024-01-24 ENCOUNTER — HOME CARE VISIT (OUTPATIENT)
Dept: HOME HEALTH SERVICES | Facility: HOME HEALTHCARE | Age: 89
End: 2024-01-24
Payer: MEDICARE

## 2024-01-24 NOTE — PROGRESS NOTES
"Specialty Pharmacy Refill Coordination Note     Blane \"CHEO\" is a 88 y.o. male contacted today regarding refills of  xtandi 120mg PO QD of specialty medication(s).      Specialty medication(s) and dose(s) confirmed: yes    Refill Questions      Flowsheet Row Most Recent Value   Changes to allergies? No   Changes to medications? No   New conditions or infections since last clinic visit No   Unplanned office visit, urgent care, ED, or hospital admission in the last 4 weeks  No   How does patient/caregiver feel medication is working? Good   Financial problems or insurance changes  No   Since the previous refill, were any specialty medication doses or scheduled injections missed or delayed?  No   Does this patient require a clinical escalation to a pharmacist? No            Delivery Questions      Flowsheet Row Most Recent Value   Delivery method [employer]   Delivery address verified with patient/caregiver? Yes   Delivery address --  [Address did change- updated in [facility]]   Number of medications in delivery 1   Medication(s) being filled and delivered Enzalutamide   Doses left of specialty medications 5 days left   Copay verified? Yes   Copay amount $55   Copay form of payment Credit/debit on file                   Follow-up: 30 day(s)     Elyse Walton, Pharmacy Technician  Specialty Pharmacy Technician        "

## 2024-01-29 ENCOUNTER — HOME CARE VISIT (OUTPATIENT)
Dept: HOME HEALTH SERVICES | Facility: HOME HEALTHCARE | Age: 89
End: 2024-01-29
Payer: MEDICARE

## 2024-01-29 PROCEDURE — G0157 HHC PT ASSISTANT EA 15: HCPCS

## 2024-01-30 VITALS
SYSTOLIC BLOOD PRESSURE: 142 MMHG | HEART RATE: 64 BPM | TEMPERATURE: 96.8 F | RESPIRATION RATE: 16 BRPM | DIASTOLIC BLOOD PRESSURE: 84 MMHG | OXYGEN SATURATION: 97 %

## 2024-01-30 NOTE — HOME HEALTH
"Routine Visit Note: Called into home by patient, who was first seen lying in bed with no apparent signs of distress. Patient states that he is doing \"okay\", but mentions that he is very fatigued today. Patient mentions that he has been compliant with his HEP as prescribed.     Skill/education provided: Educated patient on HEP, fall prevention, energy conservation strategies, and breathing techniques.     Patient/caregiver response: Patient verbalizes understanding of all provided education today, but declined to participate with gait training, balance, training and therapeutic exercises today due to fatigue. Patient states that he is agreeable to resume participation and progressions with exercise next visit and that he will continue to complete his HEP as instructed.     Plan for next visit: Patient would benefit from continued skilled PT to address deficits in BLE strength, balance, endurance, and to improve overall functional mobility. Progress as tolerated with standing exercises, gait training, and resume dynamic balance exercises. Review all provided education as needed and progress HEP appropriately."

## 2024-02-02 ENCOUNTER — HOME CARE VISIT (OUTPATIENT)
Dept: HOME HEALTH SERVICES | Facility: HOME HEALTHCARE | Age: 89
End: 2024-02-02
Payer: MEDICARE

## 2024-02-02 VITALS
RESPIRATION RATE: 16 BRPM | SYSTOLIC BLOOD PRESSURE: 123 MMHG | HEART RATE: 89 BPM | OXYGEN SATURATION: 97 % | DIASTOLIC BLOOD PRESSURE: 86 MMHG

## 2024-02-02 PROCEDURE — G0151 HHCP-SERV OF PT,EA 15 MIN: HCPCS

## 2024-02-21 ENCOUNTER — INFUSION (OUTPATIENT)
Dept: ONCOLOGY | Facility: HOSPITAL | Age: 89
End: 2024-02-21
Payer: MEDICARE

## 2024-02-21 ENCOUNTER — OFFICE VISIT (OUTPATIENT)
Dept: ONCOLOGY | Facility: CLINIC | Age: 89
End: 2024-02-21
Payer: MEDICARE

## 2024-02-21 VITALS
HEART RATE: 91 BPM | SYSTOLIC BLOOD PRESSURE: 123 MMHG | BODY MASS INDEX: 27.92 KG/M2 | RESPIRATION RATE: 16 BRPM | WEIGHT: 195 LBS | DIASTOLIC BLOOD PRESSURE: 60 MMHG | HEIGHT: 70 IN | TEMPERATURE: 98.2 F | OXYGEN SATURATION: 95 %

## 2024-02-21 DIAGNOSIS — C83.39 DIFFUSE LARGE B-CELL LYMPHOMA OF SOLID ORGAN EXCLUDING SPLEEN: Primary | ICD-10-CM

## 2024-02-21 DIAGNOSIS — C61 PROSTATE CANCER: Primary | ICD-10-CM

## 2024-02-21 LAB
ALBUMIN SERPL-MCNC: 4.1 G/DL (ref 3.5–5.2)
ALBUMIN/GLOB SERPL: 1.7 G/DL
ALP SERPL-CCNC: 98 U/L (ref 39–117)
ALT SERPL W P-5'-P-CCNC: 21 U/L (ref 1–41)
ANION GAP SERPL CALCULATED.3IONS-SCNC: 10.9 MMOL/L (ref 5–15)
AST SERPL-CCNC: 27 U/L (ref 1–40)
BASOPHILS # BLD AUTO: 0.23 10*3/MM3 (ref 0–0.2)
BASOPHILS NFR BLD AUTO: 2.1 % (ref 0–1.5)
BILIRUB SERPL-MCNC: 0.6 MG/DL (ref 0–1.2)
BUN SERPL-MCNC: 29 MG/DL (ref 8–23)
BUN/CREAT SERPL: 19.3 (ref 7–25)
CALCIUM SPEC-SCNC: 9.2 MG/DL (ref 8.6–10.5)
CHLORIDE SERPL-SCNC: 101 MMOL/L (ref 98–107)
CO2 SERPL-SCNC: 24.1 MMOL/L (ref 22–29)
CREAT SERPL-MCNC: 1.5 MG/DL (ref 0.76–1.27)
DEPRECATED RDW RBC AUTO: 48.2 FL (ref 37–54)
EGFRCR SERPLBLD CKD-EPI 2021: 45.1 ML/MIN/1.73
EOSINOPHIL # BLD AUTO: 0.59 10*3/MM3 (ref 0–0.4)
EOSINOPHIL NFR BLD AUTO: 5.4 % (ref 0.3–6.2)
ERYTHROCYTE [DISTWIDTH] IN BLOOD BY AUTOMATED COUNT: 14.2 % (ref 12.3–15.4)
GLOBULIN UR ELPH-MCNC: 2.4 GM/DL
GLUCOSE SERPL-MCNC: 291 MG/DL (ref 65–99)
HCT VFR BLD AUTO: 43.7 % (ref 37.5–51)
HGB BLD-MCNC: 14.2 G/DL (ref 13–17.7)
IMM GRANULOCYTES # BLD AUTO: 0.53 10*3/MM3 (ref 0–0.05)
IMM GRANULOCYTES NFR BLD AUTO: 4.9 % (ref 0–0.5)
LYMPHOCYTES # BLD AUTO: 1.42 10*3/MM3 (ref 0.7–3.1)
LYMPHOCYTES NFR BLD AUTO: 13 % (ref 19.6–45.3)
MAGNESIUM SERPL-MCNC: 2.3 MG/DL (ref 1.6–2.4)
MCH RBC QN AUTO: 30.1 PG (ref 26.6–33)
MCHC RBC AUTO-ENTMCNC: 32.5 G/DL (ref 31.5–35.7)
MCV RBC AUTO: 92.8 FL (ref 79–97)
MONOCYTES # BLD AUTO: 1 10*3/MM3 (ref 0.1–0.9)
MONOCYTES NFR BLD AUTO: 9.2 % (ref 5–12)
NEUTROPHILS NFR BLD AUTO: 65.4 % (ref 42.7–76)
NEUTROPHILS NFR BLD AUTO: 7.14 10*3/MM3 (ref 1.7–7)
NRBC BLD AUTO-RTO: 0 /100 WBC (ref 0–0.2)
PHOSPHATE SERPL-MCNC: 4.1 MG/DL (ref 2.5–4.5)
PLATELET # BLD AUTO: 113 10*3/MM3 (ref 140–450)
PMV BLD AUTO: 10.3 FL (ref 6–12)
POTASSIUM SERPL-SCNC: 4.8 MMOL/L (ref 3.5–5.2)
PROT SERPL-MCNC: 6.5 G/DL (ref 6–8.5)
PSA SERPL-MCNC: 0.18 NG/ML (ref 0–4)
RBC # BLD AUTO: 4.71 10*6/MM3 (ref 4.14–5.8)
SODIUM SERPL-SCNC: 136 MMOL/L (ref 136–145)
WBC NRBC COR # BLD AUTO: 10.91 10*3/MM3 (ref 3.4–10.8)

## 2024-02-21 PROCEDURE — 96402 CHEMO HORMON ANTINEOPL SQ/IM: CPT

## 2024-02-21 PROCEDURE — 25010000002 LEUPROLIDE 7.5 MG KIT: Performed by: INTERNAL MEDICINE

## 2024-02-21 PROCEDURE — 84100 ASSAY OF PHOSPHORUS: CPT

## 2024-02-21 PROCEDURE — 36415 COLL VENOUS BLD VENIPUNCTURE: CPT

## 2024-02-21 PROCEDURE — 99214 OFFICE O/P EST MOD 30 MIN: CPT | Performed by: INTERNAL MEDICINE

## 2024-02-21 PROCEDURE — 84153 ASSAY OF PSA TOTAL: CPT

## 2024-02-21 PROCEDURE — 25010000002 DENOSUMAB 120 MG/1.7ML SOLUTION: Performed by: INTERNAL MEDICINE

## 2024-02-21 PROCEDURE — 83735 ASSAY OF MAGNESIUM: CPT

## 2024-02-21 PROCEDURE — 80053 COMPREHEN METABOLIC PANEL: CPT

## 2024-02-21 PROCEDURE — 85025 COMPLETE CBC W/AUTO DIFF WBC: CPT

## 2024-02-21 PROCEDURE — 1126F AMNT PAIN NOTED NONE PRSNT: CPT | Performed by: INTERNAL MEDICINE

## 2024-02-21 PROCEDURE — 96372 THER/PROPH/DIAG INJ SC/IM: CPT

## 2024-02-21 RX ADMIN — LEUPROLIDE ACETATE 7.5 MG: 7.5 INJECTION, SUSPENSION, EXTENDED RELEASE SUBCUTANEOUS at 15:30

## 2024-02-21 RX ADMIN — DENOSUMAB 120 MG: 120 INJECTION SUBCUTANEOUS at 15:29

## 2024-02-21 NOTE — PROGRESS NOTES
DATE OF VISIT: 2/21/2024    REASON FOR VISIT: Followup for: 1.  Diffuse large B-cell lymphoma 2.  Prostate cancer     PROBLEM LIST:  1. Prostate cancer, initially presented as [id number] status post radical  prostatectomy 2000.  A.  Patient is on Lupron plus Prolia  B.  Tried to add apalutamide second rising PSA patient declined.  C.  Whole-body PSMA PET scan done September 2023 revealed 2 separate areas of uptake in the skeleton at the L1 left transverse process and close to cartilage junction of the right third anterior rib.  D.  We will start Eligard with Xtandi September 20, 2023  2.  Right-sided pulmonary embolisms:  A.  Currently on Eliquis twice a day  3.  Type 2 diabetes  4. Hhypertension  5. Hypercholesterolemia.  6.  Right subsegmental PE:  A. Started on Eliquis twice a day March 2021  7.  Osteopenia  8.  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  Status post right nephrectomy December 7, 2021  B.  Started adjuvant chemotherapy R-CHOP January 14, 2022, status post 3 cycles completed February 23, 2022      HISTORY OF PRESENT ILLNESS: The patient is a very pleasant 88 y.o. male  with past medical history significant for diffuse large B-cell lymphoma right kidney diagnosed December 2021.  Patient status post 3 cycles of chemotherapy with R-CHOP.  Repeat PET scan showed no evidence of residual disease. The patient is here today for scheduled follow-up visit.    SUBJECTIVE: Geovanny is here today by himself.  All in all he is doing well.  He denies any fever chills or night sweats.  He is staying active.    Past History:  Medical History: has a past medical history of Aortic stenosis, Arthritis, Bilateral impacted cerumen (11/23/2016), Bronchitis, CHF (congestive heart failure), Chronic gout of right foot (06/13/2016), COVID-19 vaccine series completed (05/12/2021), Depression, Diabetes mellitus, Diverticulitis, Exogenous obesity, Gout, Heart murmur, History of vitamin D deficiency,  Hypercholesteremia (08/11/2016), Hyperlipidemia, Hypertension, Kidney lesion, Malignant neoplasm of prostate, Morbid obesity (08/11/2016), Pneumonia (05/12/2021), Primary osteoarthritis involving multiple joints (06/13/2016), Pulmonary embolism, Sleep apnea (05/12/2021), Uncontrolled type 2 diabetes mellitus with hyperglycemia (06/13/2016), and Vertigo.   Surgical History: has a past surgical history that includes Colostomy (1999); Other surgical history; Exploratory laparotomy; Colonoscopy; Revision Colostomy; Prostate surgery; Prostatectomy (2000); Colon surgery; Tonsillectomy; Skin cancer excision; Lipoma Excision; Mohs surgery; Cardiac valve replacement (05/12/2021); nephroureterectomy (Right, 12/7/2021); and Cystoscopy (N/A, 12/7/2021).   Family History: family history includes Breast cancer in an other family member; Cancer in his mother and another family member; Diabetes in his mother and another family member; Heart disease in his father; Hypertension in an other family member; Lung cancer in his mother; Migraines in an other family member; Obesity in an other family member.   Social History: reports that he has never smoked. He has never been exposed to tobacco smoke. He has never used smokeless tobacco. He reports that he does not drink alcohol and does not use drugs.    (Not in a hospital admission)     Allergies: Ampicillin, Medrol [methylprednisolone], Myrbetriq [mirabegron], Penicillins, Bee venom, and Melatonin     Review of Systems   Constitutional:  Positive for fatigue.   Respiratory:  Positive for shortness of breath.    Musculoskeletal:  Positive for arthralgias.         Current Outpatient Medications:     allopurinol (ZYLOPRIM) 100 MG tablet, Take 1 tablet by mouth Daily., Disp: 90 tablet, Rfl: 3    amiodarone (PACERONE) 200 MG tablet, Take 1 tablet by mouth Daily., Disp: , Rfl:     benzonatate (TESSALON) 100 MG capsule, Take 1 capsule by mouth 3 (Three) Times a Day As Needed for Cough.  (Patient not taking: Reported on 12/15/2023), Disp: 30 capsule, Rfl: 0    bumetanide (BUMEX) 1 MG tablet, Taking 1  tablet by mouth every other day for 30 days  Indications: Cardiac Failure, Edema, Disp: , Rfl:     Calcium Carb-Cholecalciferol 600-5 MG-MCG tablet, Take 1 tablet by mouth Daily., Disp: 30 tablet, Rfl: 3    Dulaglutide (Trulicity) 1.5 MG/0.5ML solution pen-injector, Inject 1.5 mg under the skin into the appropriate area as directed 1 (One) Time Per Week. Takes on Wednesdays, Disp: 7 mL, Rfl: 2    empagliflozin (Jardiance) 25 MG tablet tablet, Take 1 tablet by mouth Daily., Disp: 90 tablet, Rfl: 3    Entresto 24-26 MG tablet, TAKE ONE TABLET BY MOUTH EVERY 12 HOURS, Disp: 180 tablet, Rfl: 3    enzalutamide (XTANDI) 40 MG tablet tablet, Take 3 tablets by mouth Daily., Disp: 90 tablet, Rfl: 11    fluticasone (FLONASE) 50 MCG/ACT nasal spray, 2 sprays into the nostril(s) as directed by provider Daily. 2 puffs each nostril (Patient taking differently: 2 sprays into the nostril(s) as directed by provider Daily As Needed for Rhinitis or Allergies.), Disp: 1 bottle, Rfl: 0    ketoconazole (NIZORAL) 2 % shampoo, Apply 1 application  topically to the appropriate area as directed 2 (Two) Times a Week. Indications: Tinea Versicolor, Disp: , Rfl:     Lantus 100 UNIT/ML injection, Inject up to 50 units daily subcutaneously (Patient taking differently: Inject up to 20 units daily subcutaneously), Disp: 20 mL, Rfl: 5    metoprolol succinate XL (TOPROL-XL) 25 MG 24 hr tablet, Take 0.5 tablets by mouth Daily for 360 days., Disp: 45 tablet, Rfl: 3    multivitamin with minerals tablet tablet, Take 1 tablet by mouth Daily. AREDS 2  Indications: vitamin deficiency, Disp: , Rfl:     pravastatin (PRAVACHOL) 40 MG tablet, TAKE ONE TABLET BY MOUTH EVERY NIGHT AT BEDTIME, Disp: 90 tablet, Rfl: 3  No current facility-administered medications for this visit.    Facility-Administered Medications Ordered in Other Visits:      "denosumab (XGEVA) injection 120 mg, 120 mg, Subcutaneous, Once, Shannan Ramon MD    leuprolide (ELIGARD) injection 7.5 mg, 7.5 mg, Subcutaneous, Once, Shannan Ramon MD    PHYSICAL EXAMINATION:   /60   Pulse 91   Temp 98.2 °F (36.8 °C)   Resp 16   Ht 177.8 cm (70\")   Wt 88.5 kg (195 lb)   SpO2 95%   BMI 27.98 kg/m²    Pain Score    02/21/24 1412   PainSc: 0-No pain                         ECOG Performance Status: 2 - Symptomatic, <50% confined to bed  General Appearance:  alert, cooperative, no apparent distress and appears stated age   Neurologic/Psychiatric: A&O x 3, gait steady, appropriate affect, strength 5/5 in all muscle groups   HEENT:  Normocephalic, without obvious abnormality, mucous membranes moist   Neck: Supple, symmetrical, trachea midline, no adenopathy;  No thyromegaly, masses, or tenderness   Lungs:   Clear to auscultation bilaterally; respirations regular, even, and unlabored bilaterally   Heart:  Regular rate and rhythm, no murmurs appreciated   Abdomen:   Soft, non-tender, non-distended, and no organomegaly   Lymph nodes: No cervical, supraclavicular, inguinal or axillary adenopathy noted   Extremities:  +2 bilateral lower extremities edema    Skin: No rashes, ulcers, or suspicious lesions noted     No visits with results within 2 Week(s) from this visit.   Latest known visit with results is:   Infusion on 01/10/2024   Component Date Value Ref Range Status    PSA 01/10/2024 0.265  0.000 - 4.000 ng/mL Final    Glucose 01/10/2024 294 (H)  65 - 99 mg/dL Final    Glucose >180, Hemoglobin A1C recommended.    BUN 01/10/2024 23  8 - 23 mg/dL Final    Creatinine 01/10/2024 1.36 (H)  0.76 - 1.27 mg/dL Final    Sodium 01/10/2024 139  136 - 145 mmol/L Final    Potassium 01/10/2024 4.5  3.5 - 5.2 mmol/L Final    Chloride 01/10/2024 103  98 - 107 mmol/L Final    CO2 01/10/2024 27.9  22.0 - 29.0 mmol/L Final    Calcium 01/10/2024 8.8  8.6 - 10.5 mg/dL Final    Total Protein 01/10/2024 6.7  6.0 " - 8.5 g/dL Final    Albumin 01/10/2024 3.8  3.5 - 5.2 g/dL Final    ALT (SGPT) 01/10/2024 21  1 - 41 U/L Final    AST (SGOT) 01/10/2024 23  1 - 40 U/L Final    Alkaline Phosphatase 01/10/2024 122 (H)  39 - 117 U/L Final    Total Bilirubin 01/10/2024 0.7  0.0 - 1.2 mg/dL Final    Globulin 01/10/2024 2.9  gm/dL Final    A/G Ratio 01/10/2024 1.3  g/dL Final    BUN/Creatinine Ratio 01/10/2024 16.9  7.0 - 25.0 Final    Anion Gap 01/10/2024 8.1  5.0 - 15.0 mmol/L Final    eGFR 01/10/2024 50.7 (L)  >60.0 mL/min/1.73 Final    Magnesium 01/10/2024 2.2  1.6 - 2.4 mg/dL Final    Phosphorus 01/10/2024 3.6  2.5 - 4.5 mg/dL Final    WBC 01/10/2024 6.61  3.40 - 10.80 10*3/mm3 Final    RBC 01/10/2024 4.31  4.14 - 5.80 10*6/mm3 Final    Hemoglobin 01/10/2024 13.0  13.0 - 17.7 g/dL Final    Hematocrit 01/10/2024 40.1  37.5 - 51.0 % Final    MCV 01/10/2024 93.0  79.0 - 97.0 fL Final    MCH 01/10/2024 30.2  26.6 - 33.0 pg Final    MCHC 01/10/2024 32.4  31.5 - 35.7 g/dL Final    RDW 01/10/2024 15.1  12.3 - 15.4 % Final    RDW-SD 01/10/2024 51.8  37.0 - 54.0 fl Final    MPV 01/10/2024 10.4  6.0 - 12.0 fL Final    Platelets 01/10/2024 119 (L)  140 - 450 10*3/mm3 Final    Neutrophil % 01/10/2024 65.3  42.7 - 76.0 % Final    Lymphocyte % 01/10/2024 15.7 (L)  19.6 - 45.3 % Final    Monocyte % 01/10/2024 10.1  5.0 - 12.0 % Final    Eosinophil % 01/10/2024 5.6  0.3 - 6.2 % Final    Basophil % 01/10/2024 1.8 (H)  0.0 - 1.5 % Final    Immature Grans % 01/10/2024 1.5 (H)  0.0 - 0.5 % Final    Neutrophils, Absolute 01/10/2024 4.31  1.70 - 7.00 10*3/mm3 Final    Lymphocytes, Absolute 01/10/2024 1.04  0.70 - 3.10 10*3/mm3 Final    Monocytes, Absolute 01/10/2024 0.67  0.10 - 0.90 10*3/mm3 Final    Eosinophils, Absolute 01/10/2024 0.37  0.00 - 0.40 10*3/mm3 Final    Basophils, Absolute 01/10/2024 0.12  0.00 - 0.20 10*3/mm3 Final    Immature Grans, Absolute 01/10/2024 0.10 (H)  0.00 - 0.05 10*3/mm3 Final    nRBC 01/10/2024 0.0  0.0 - 0.2 /100 WBC Final         No results found.    ASSESSMENT: The patient is a very pleasant 88 y.o. male  with diffuse large B-cell lymphoma      PLAN:    1.  Diffuse large B-cell lymphoma stage Ia:  A.  I will order scans prior to return.  His last PET scan done September 7, 2023    2.  Prostate cancer:  A.  I will continue the patient on Lupron monthly.  B. I will continue the patient on Xtandi 120 mg daily.  We will consider increasing the dose to full dose of 160 mg daily if he is doing well.  C.  I did go over the blood work results with the patient from January 10, 2024.  PSA is down to 0.265.  Normal CBC.  Creatinine stable at 1.4.    3.  Bone metastases:  A.  I will continue the patient on Xgeva 120 mg subcu every 4 weeks.  B.  I will switch him to every 3 months after the first year.  Will be due September 2024.  C.  I will continue the patient on calcium and vitamin D daily.    4.  Right-sided pulmonary embolism:  A. The patient has completed 1 year of treatment.  His repeated CT PE protocol in June 2022 did not reveal any evidence of recurrent PEs.    5.  Hypercholesteremia:  A.  The patient continue pravastatin daily.    6.  Type 2 diabetes:  A.  The patient will continue Mo and Trulicity.    FOLLOW UP: 1 month with Eligard treatment as well as blood work.    Shannan Ramon MD  2/21/2024

## 2024-02-22 ENCOUNTER — SPECIALTY PHARMACY (OUTPATIENT)
Dept: ONCOLOGY | Facility: HOSPITAL | Age: 89
End: 2024-02-22
Payer: MEDICARE

## 2024-03-04 ENCOUNTER — SPECIALTY PHARMACY (OUTPATIENT)
Dept: ONCOLOGY | Facility: HOSPITAL | Age: 89
End: 2024-03-04
Payer: MEDICARE

## 2024-03-04 NOTE — PROGRESS NOTES
"Specialty Pharmacy Refill Coordination Note     Blane \"CHEO\" is a 88 y.o. male contacted today regarding refills of  Xtandi 120mg PO QD of specialty medication(s).      Specialty medication(s) and dose(s) confirmed: yes    Refill Questions      Flowsheet Row Most Recent Value   Changes to allergies? No   Changes to medications? No   New conditions or infections since last clinic visit No   Unplanned office visit, urgent care, ED, or hospital admission in the last 4 weeks  No   How does patient/caregiver feel medication is working? Good   Financial problems or insurance changes  No   Since the previous refill, were any specialty medication doses or scheduled injections missed or delayed?  No   Does this patient require a clinical escalation to a pharmacist? No            Delivery Questions      Flowsheet Row Most Recent Value   Delivery method [employer]   Delivery address verified with patient/caregiver? Yes   Delivery address Home   Number of medications in delivery 1   Medication(s) being filled and delivered Enzalutamide   Doses left of specialty medications 4 days left   Copay verified? Yes   Copay amount $55   Copay form of payment Credit/debit on file                   Follow-up: 30 day(s)     Elyse Walton, Pharmacy Technician  Specialty Pharmacy Technician        "

## 2024-03-06 ENCOUNTER — SPECIALTY PHARMACY (OUTPATIENT)
Dept: ONCOLOGY | Facility: HOSPITAL | Age: 89
End: 2024-03-06
Payer: MEDICARE

## 2024-03-06 NOTE — PROGRESS NOTES
Specialty Pharmacy Patient Management Program  Oncology 6-Month Clinical Assessment       Blane Dietz is a 88 y.o. male with prostate cancer seen today to assess adherence and side effects.    Reason for Outreach: Routine medication check-in .    Regimen: Enzalutamide 120mg PO daily    Specialty Pharmacy Goal   Goals Addressed Today        Specialty Pharmacy General Goal      Clinical goal/therapeutic target: stable or decreasing PSA and disease control, per the recent oncology clinic notes and labs.  PSA          12/13/2023    15:40 1/10/2024    14:55 2/21/2024    14:43   PSA   PSA 0.367  0.265  0.180        PSA   Date Value (ng/mL)   03/30/2022 3.580   01/12/2022 4.740 (H)   12/01/2021 5.150 (H)   09/29/2021 4.180 (H)   04/28/2021 3.380   12/30/2020 1.200   09/23/2020 0.776                 Problem List   Problem list reviewed by Kim Alaniz, PharmD on 3/6/2024 at  8:05 AM  Patient Active Problem List   Diagnosis Code    Essential hypertension I10    Type 2 diabetes mellitus with hyperglycemia, with long-term current use of insulin E11.65, Z79.4    Prostate cancer C61    Aortic stenosis I35.0    Sleep apnea G47.30    S/P AVR Z95.2    HLD (hyperlipidemia) E78.5    Renal mass N28.89    s/p right nepheroureterectomy and adrenalectomy  E89.6    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    PICC (peripherally inserted central catheter) flush Z45.2    History of pulmonary embolism Z86.711    History of malignant neoplasm of prostate Z85.46    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   Patient can self administer medications: yes  Medication Follow-Up Plan: Next clinical assessment and Refill coordination  Lab Review: The labs listed below have been reviewed. No dose adjustments are needed for the oral specialty medication(s) based on the labs.    Lab Results   Component Value Date    GLUCOSE 291 (H) 02/21/2024    CALCIUM 9.2 02/21/2024     02/21/2024    K 4.8 02/21/2024    CO2 24.1 02/21/2024     02/21/2024    BUN 29 (H) 02/21/2024    CREATININE 1.50 (H) 02/21/2024    EGFRIFAFRI 95 10/14/2020    EGFRIFNONA 55 (L) 02/07/2022    BCR 19.3 02/21/2024    ANIONGAP 10.9 02/21/2024     Lab Results   Component Value Date    WBC 10.91 (H) 02/21/2024    RBC 4.71 02/21/2024    HGB 14.2 02/21/2024    HCT 43.7 02/21/2024    MCV 92.8 02/21/2024    MCH 30.1 02/21/2024    MCHC 32.5 02/21/2024    RDW 14.2 02/21/2024    RDWSD 48.2 02/21/2024    MPV 10.3 02/21/2024     (L) 02/21/2024    NEUTRORELPCT 65.4 02/21/2024    LYMPHORELPCT 13.0 (L) 02/21/2024    MONORELPCT 9.2 02/21/2024    EOSRELPCT 5.4 02/21/2024    BASORELPCT 2.1 (H) 02/21/2024    AUTOIGPER 4.9 (H) 02/21/2024    NEUTROABS 7.14 (H) 02/21/2024    LYMPHSABS 1.42 02/21/2024    MONOSABS 1.00 (H) 02/21/2024    EOSABS 0.59 (H) 02/21/2024    BASOSABS 0.23 (H) 02/21/2024    AUTOIGNUM 0.53 (H) 02/21/2024    NRBC 0.0 02/21/2024     Drug-drug interactions  Completed medication reconciliation today to assess for drug interactions. Patient denies starting or stopping any medications.    Assessed medication list for interactions,  enzalutamide may decrease the concentration of amoidarone. Patient previously notified provider.  Advised patient to call the clinic if any new medications are started so we can assess for drug-drug interactions.  Drug-food interactions discussed.  Vaccines are coordinated by the patient's  oncologist and primary care provider.    Medications   Medicines reviewed by Kim Alaniz PharmD on 3/6/2024 at  8:05 AM  Prior to Admission medications    Medication Sig Start Date End Date Taking? Authorizing Provider   allopurinol (ZYLOPRIM) 100 MG tablet Take 1 tablet by mouth Daily. 6/30/23   David Em MD   amiodarone (PACERONE) 200 MG tablet Take 1 tablet by mouth Daily. 12/5/22   Ha Toussaint MD   benzonatate (TESSALON) 100 MG capsule Take 1 capsule by mouth 3 (Three) Times a Day As Needed for Cough.  Patient not taking: Reported on 12/15/2023 12/4/23   Sasha Eduardo APRN   bumetanide (BUMEX) 1 MG tablet Taking 1  tablet by mouth every other day for 30 days  Indications: Cardiac Failure, Edema 5/25/23   Provider, MD Dave   Calcium Carb-Cholecalciferol 600-5 MG-MCG tablet Take 1 tablet by mouth Daily. 9/12/23   Shannan Ramon MD   Dulaglutide (Trulicity) 1.5 MG/0.5ML solution pen-injector Inject 1.5 mg under the skin into the appropriate area as directed 1 (One) Time Per Week. Takes on Wednesdays 1/19/23   Xavier Boston MD   empagliflozin (Jardiance) 25 MG tablet tablet Take 1 tablet by mouth Daily. 9/29/23   David Em MD   Entresto 24-26 MG tablet TAKE ONE TABLET BY MOUTH EVERY 12 HOURS 8/21/23   David Em MD   enzalutamide (XTANDI) 40 MG tablet tablet Take 3 tablets by mouth Daily. 9/18/23   Shannan Ramon MD   fluticasone (FLONASE) 50 MCG/ACT nasal spray 2 sprays into the nostril(s) as directed by provider Daily. 2 puffs each nostril  Patient taking differently: 2 sprays into the nostril(s) as directed by provider Daily As Needed for Rhinitis or Allergies. 12/20/19   Rosanna Nicolas APRN   ketoconazole (NIZORAL) 2 % shampoo Apply 1 application  topically to the appropriate area as directed 2 (Two) Times a Week. Indications: Tinea Versicolor 5/26/23   Provider, MD Roman Acosta 100 UNIT/ML injection Inject up to 50 units daily  subcutaneously  Patient taking differently: Inject up to 20 units daily subcutaneously 7/21/23   Xavier Boston MD   metoprolol succinate XL (TOPROL-XL) 25 MG 24 hr tablet Take 0.5 tablets by mouth Daily for 360 days. 6/30/23 6/24/24  David Em MD   multivitamin with minerals tablet tablet Take 1 tablet by mouth Daily. AREDS 2  Indications: vitamin deficiency    Provider, MD Dave   pravastatin (PRAVACHOL) 40 MG tablet TAKE ONE TABLET BY MOUTH EVERY NIGHT AT BEDTIME 8/21/23   David Em MD       Allergies  Known allergies and reactions were discussed with the patient. The patient's chart has been reviewed for allergy information and updated as necessary.   Allergies   Allergen Reactions    Ampicillin Anaphylaxis    Medrol [Methylprednisolone] Unknown - High Severity     Made his blood sugar get really high, can't take this    Myrbetriq [Mirabegron] Unknown - High Severity     High BP    Penicillins Anaphylaxis and Shortness Of Breath    Bee Venom Swelling    Melatonin Hallucinations         Allergies reviewed by Kim Alaniz, PharmD on 3/6/2024 at  8:05 AM    Hospitalizations and Urgent Care Visits Since Last Assessment:  Unplanned hospitalizations or inpatient admissions: 0  ED Visits: 0  Urgent Office Visits: 0    Adherence Assessment:  Adherence Questions  Linked Medication(s) Assessed: Enzalutamide  On average, how many doses/injections does the patient miss per month?: 0  What are the identified reasons for non-adherence or missed doses? : no problems identified  What is the estimated medication adherence level?: % (PDC = 93%)  Based on the patient/caregiver response and refill history, does this patient require an MTP to track adherence improvements?: no    Quality of Life Assessment:  Quality of Life Assessment  Quality of Life Improvement Scale: 9-A good deal better  -- Quality of Life: 9/10    Financial Assessment:  Medication availability/affordability: Patient has had no  issues obtaining medication from pharmacy.    Attestation:  I attest the patient was actively involved in and has agreed to the above plan of care. If the prescribed therapy is at any point deemed not appropriate based on the current or future assessments, a consultation will be initiated with the patient's specialty care provider to determine the best course of action. The revised plan of therapy will be documented along with any required assessments and/or additional patient education provided.       All questions addressed and patient had no additional concerns. Patient has pharmacy contact information.    Kim Alaniz PharmD, [facility]  Clinical Oncology Pharmacy Specialist  Phone: [phone]      3/6/2024  08:06 EST

## 2024-03-25 ENCOUNTER — TELEPHONE (OUTPATIENT)
Dept: INTERNAL MEDICINE | Facility: CLINIC | Age: 89
End: 2024-03-25
Payer: MEDICARE

## 2024-03-25 NOTE — TELEPHONE ENCOUNTER
Patient states that [facility] is doing a TB screening today and states they should send the results to Dr. Em.

## 2024-03-29 ENCOUNTER — SPECIALTY PHARMACY (OUTPATIENT)
Dept: ONCOLOGY | Facility: HOSPITAL | Age: 89
End: 2024-03-29
Payer: MEDICARE

## 2024-03-29 NOTE — PROGRESS NOTES
"Specialty Pharmacy Refill Coordination Note     Blane \"CHEO\" is a 88 y.o. male contacted today regarding refills of  enzalutamide 120mg PO QD of specialty medication(s).    Scheduled delivery on 4/2/24.    Specialty medication(s) and dose(s) confirmed: yes    Refill Questions      Flowsheet Row Most Recent Value   Changes to allergies? No   Changes to medications? No   New conditions or infections since last clinic visit No   Unplanned office visit, urgent care, ED, or hospital admission in the last 4 weeks  No   How does patient/caregiver feel medication is working? Good   Financial problems or insurance changes  No   Since the previous refill, were any specialty medication doses or scheduled injections missed or delayed?  No   Does this patient require a clinical escalation to a pharmacist? No            Delivery Questions      Flowsheet Row Most Recent Value   Delivery method [employer]   Delivery address verified with patient/caregiver? Yes   Delivery address Temporary   Number of medications in delivery 1   Medication(s) being filled and delivered Enzalutamide   Doses left of specialty medications 6 days   Copay verified? Yes   Copay amount $0   Copay form of payment No copayment ($0)                   Follow-up: 30 day(s)     Elyse Walton, Pharmacy Technician  Specialty Pharmacy Technician        "

## 2024-04-04 ENCOUNTER — OFFICE VISIT (OUTPATIENT)
Dept: INTERNAL MEDICINE | Facility: CLINIC | Age: 89
End: 2024-04-04
Payer: MEDICARE

## 2024-04-04 VITALS
BODY MASS INDEX: 31.66 KG/M2 | WEIGHT: 197 LBS | SYSTOLIC BLOOD PRESSURE: 124 MMHG | OXYGEN SATURATION: 96 % | HEART RATE: 77 BPM | TEMPERATURE: 98.4 F | HEIGHT: 66 IN | DIASTOLIC BLOOD PRESSURE: 74 MMHG

## 2024-04-04 DIAGNOSIS — L98.9 BACK SKIN LESION: ICD-10-CM

## 2024-04-04 DIAGNOSIS — R15.9 INCONTINENCE OF FECES WITH FECAL URGENCY: Primary | ICD-10-CM

## 2024-04-04 DIAGNOSIS — Z79.4 TYPE 2 DIABETES MELLITUS WITH HYPERGLYCEMIA, WITH LONG-TERM CURRENT USE OF INSULIN: ICD-10-CM

## 2024-04-04 DIAGNOSIS — N18.32 STAGE 3B CHRONIC KIDNEY DISEASE: ICD-10-CM

## 2024-04-04 DIAGNOSIS — E11.65 TYPE 2 DIABETES MELLITUS WITH HYPERGLYCEMIA, WITH LONG-TERM CURRENT USE OF INSULIN: ICD-10-CM

## 2024-04-04 DIAGNOSIS — I10 ESSENTIAL HYPERTENSION: ICD-10-CM

## 2024-04-04 DIAGNOSIS — R15.2 INCONTINENCE OF FECES WITH FECAL URGENCY: Primary | ICD-10-CM

## 2024-04-04 LAB
EXPIRATION DATE: ABNORMAL
EXPIRATION DATE: NORMAL
HBA1C MFR BLD: 10.3 % (ref 4.5–5.7)
Lab: ABNORMAL
Lab: NORMAL
POC CREATININE URINE: 10
POC MICROALBUMIN URINE: 80

## 2024-04-04 NOTE — PROGRESS NOTES
The ABCs of the Annual Wellness Visit  Subsequent Medicare Wellness Visit    Subjective      Blane Dietz is a 88 y.o. male who presents for a Subsequent Medicare Wellness Visit.    Review of Systems   Constitutional:  Positive for fatigue. Negative for activity change, appetite change and fever.   HENT:  Negative for congestion, ear discharge, ear pain and trouble swallowing.    Eyes:  Negative for photophobia and visual disturbance.   Respiratory:  Negative for cough and shortness of breath.    Cardiovascular:  Negative for chest pain and palpitations.   Gastrointestinal:  Negative for abdominal distention, abdominal pain, constipation, diarrhea, nausea and vomiting.   Endocrine: Negative.    Genitourinary:  Negative for dysuria, hematuria and urgency.   Musculoskeletal:  Positive for arthralgias. Negative for back pain, joint swelling and myalgias.   Skin:  Positive for rash. Negative for color change.   Allergic/Immunologic: Negative.    Neurological:  Negative for dizziness, weakness, light-headedness and headaches.   Hematological:  Negative for adenopathy. Does not bruise/bleed easily.   Psychiatric/Behavioral:  Negative for agitation, confusion and dysphoric mood. The patient is not nervous/anxious.         The following portions of the patient's history were reviewed and   updated as appropriate: allergies, current medications, past family history, past medical history, past social history, past surgical history, and problem list.    Compared to one year ago, the patient feels his physical   health is better.    Compared to one year ago, the patient feels his mental   health is better.    Recent Hospitalizations:  This patient has had a [facility] admission record on file within the last 365 days.    Current Medical Providers:  Patient Care Team:  David Em MD as PCP - General (Internal Medicine)  Ha Toussaint MD as Consulting Physician (Cardiology)  Shannan Ramon MD as Consulting  Physician (Hematology and Oncology)  Xavier Boston MD as Consulting Physician (Endocrinology)    Outpatient Medications Prior to Visit   Medication Sig Dispense Refill    allopurinol (ZYLOPRIM) 100 MG tablet Take 1 tablet by mouth Daily. 90 tablet 3    amiodarone (PACERONE) 200 MG tablet Take 1 tablet by mouth Daily.      Calcium Carb-Cholecalciferol 600-5 MG-MCG tablet Take 1 tablet by mouth Daily. 30 tablet 3    Dulaglutide (Trulicity) 1.5 MG/0.5ML solution pen-injector Inject 1.5 mg under the skin into the appropriate area as directed 1 (One) Time Per Week. Takes on Wednesdays 7 mL 2    empagliflozin (Jardiance) 25 MG tablet tablet Take 1 tablet by mouth Daily. 90 tablet 3    Entresto 24-26 MG tablet TAKE ONE TABLET BY MOUTH EVERY 12 HOURS 180 tablet 3    enzalutamide (XTANDI) 40 MG tablet tablet Take 3 tablets by mouth Daily. 90 tablet 11    fluticasone (FLONASE) 50 MCG/ACT nasal spray 2 sprays into the nostril(s) as directed by provider Daily. 2 puffs each nostril (Patient taking differently: 2 sprays into the nostril(s) as directed by provider Daily As Needed for Rhinitis or Allergies.) 1 bottle 0    ketoconazole (NIZORAL) 2 % shampoo Apply 1 application  topically to the appropriate area as directed 2 (Two) Times a Week. Indications: Tinea Versicolor      Lantus 100 UNIT/ML injection Inject up to 50 units daily subcutaneously (Patient taking differently: Inject up to 20 units daily subcutaneously) 20 mL 5    metoprolol succinate XL (TOPROL-XL) 25 MG 24 hr tablet Take 0.5 tablets by mouth Daily for 360 days. 45 tablet 3    multivitamin with minerals tablet tablet Take 1 tablet by mouth Daily. AREDS 2  Indications: vitamin deficiency      pravastatin (PRAVACHOL) 40 MG tablet TAKE ONE TABLET BY MOUTH EVERY NIGHT AT BEDTIME 90 tablet 3    benzonatate (TESSALON) 100 MG capsule Take 1 capsule by mouth 3 (Three) Times a Day As Needed for Cough. (Patient not taking: Reported on 12/15/2023) 30 capsule 0     "bumetanide (BUMEX) 1 MG tablet Taking 1  tablet by mouth every other day for 30 days  Indications: Cardiac Failure, Edema (Patient not taking: Reported on 4/4/2024)       No facility-administered medications prior to visit.       No opioid medication identified on active medication list. I have reviewed chart for other potential  high risk medication/s and harmful drug interactions in the elderly.        Aspirin is not on active medication list.  Aspirin use is not indicated based on review of current medical condition/s. Risk of harm outweighs potential benefits.  .    Patient Active Problem List   Diagnosis    Essential hypertension    Type 2 diabetes mellitus with hyperglycemia, with long-term current use of insulin    Prostate cancer    Aortic stenosis    Sleep apnea    S/P AVR    HLD (hyperlipidemia)    Renal mass    s/p right nepheroureterectomy and adrenalectomy     Diffuse large B-cell lymphoma of solid organ excluding spleen    PICC (peripherally inserted central catheter) flush    History of pulmonary embolism    History of malignant neoplasm of prostate    Lymphoma of kidney    Stage 3b chronic kidney disease    Pneumonia due to COVID-19 virus    Abrasion     Advance Care Planning  Advance Directive is on file.  ACP discussion was held with the patient during this visit. Patient has an advance directive in EMR which is still valid.      Objective    Vitals:    04/04/24 1352   BP: 124/74   Pulse: 77   Temp: 98.4 °F (36.9 °C)   TempSrc: Infrared   SpO2: 96%   Weight: 89.4 kg (197 lb)   Height: 167.6 cm (66\")   PainSc:   7   PainLoc: Buttocks     Estimated body mass index is 31.8 kg/m² as calculated from the following:    Height as of this encounter: 167.6 cm (66\").    Weight as of this encounter: 89.4 kg (197 lb).    Physical Exam  Constitutional:       General: He is not in acute distress.     Appearance: He is well-developed.   HENT:      Nose: Nose normal.   Eyes:      General: No scleral icterus.     " Conjunctiva/sclera: Conjunctivae normal.   Neck:      Thyroid: No thyromegaly.      Trachea: No tracheal deviation.   Cardiovascular:      Rate and Rhythm: Normal rate and regular rhythm.      Heart sounds: No murmur heard.     No friction rub.   Pulmonary:      Effort: No respiratory distress.      Breath sounds: No wheezing or rales.   Abdominal:      General: There is no distension.      Palpations: Abdomen is soft. There is no mass.      Tenderness: There is no abdominal tenderness. There is no guarding.   Musculoskeletal:         General: Deformity present. Normal range of motion.   Lymphadenopathy:      Cervical: No cervical adenopathy.   Skin:     General: Skin is warm and dry.      Findings: No erythema or rash.   Neurological:      Mental Status: He is alert and oriented to person, place, and time.      Cranial Nerves: No cranial nerve deficit.      Coordination: Coordination normal.      Deep Tendon Reflexes: Reflexes are normal and symmetric.   Psychiatric:         Behavior: Behavior normal.         Thought Content: Thought content normal.         Judgment: Judgment normal.         BMI is >= 30 and <35. (Class 1 Obesity). The following options were offered after discussion;: exercise counseling/recommendations and nutrition counseling/recommendations      Does the patient have evidence of cognitive impairment?   No            HEALTH RISK ASSESSMENT    Smoking Status:  Social History     Tobacco Use   Smoking Status Never    Passive exposure: Never   Smokeless Tobacco Never     Alcohol Consumption:  Social History     Substance and Sexual Activity   Alcohol Use No     Fall Risk Screen:    STEADI Fall Risk Assessment was completed, and patient is at MODERATE risk for falls. Assessment completed on:2024    Depression Screenin/4/2024     1:51 PM   PHQ-2/PHQ-9 Depression Screening   Little Interest or Pleasure in Doing Things 0-->not at all   Feeling Down, Depressed or Hopeless 0-->not at all    PHQ-9: Brief Depression Severity Measure Score 0       Health Habits and Functional and Cognitive Screenin/4/2024     1:49 PM   Functional & Cognitive Status   Do you have difficulty preparing food and eating? No   Do you have difficulty bathing yourself, getting dressed or grooming yourself? No   Do you have difficulty using the toilet? No   Do you have difficulty moving around from place to place? No   Do you have trouble with steps or getting out of a bed or a chair? No   Current Diet Unhealthy Diet   Dental Exam Not up to date   Eye Exam Up to date   Exercise (times per week) 0 times per week   Current Exercises Include No Regular Exercise   Do you need help using the phone?  No   Are you deaf or do you have serious difficulty hearing?  No   Do you need help to go to places out of walking distance? Yes   Do you need help shopping? No   Do you need help preparing meals?  No   Do you need help with housework?  No   Do you need help with laundry? No   Do you need help taking your medications? No   Do you need help managing money? No   Do you ever drive or ride in a car without wearing a seat belt? No   Have you felt unusual stress, anger or loneliness in the last month? Yes   Who do you live with? Community   If you need help, do you have trouble finding someone available to you? No   Have you been bothered in the last four weeks by sexual problems? No   Do you have difficulty concentrating, remembering or making decisions? No       Age-appropriate Screening Schedule:  Refer to the list below for future screening recommendations based on patient's age, sex and/or medical conditions. Orders for these recommended tests are listed in the plan section. The patient has been provided with a written plan.    Health Maintenance   Topic Date Due    Hepatitis B (1 of 3 - Risk 3-dose series) Never done    ZOSTER VACCINE (2 of 2) 2020    DIABETIC EYE EXAM  10/28/2021    LIPID PANEL  10/26/2023    URINE  MICROALBUMIN  12/29/2023    ANNUAL WELLNESS VISIT  02/10/2024    BMI FOLLOWUP  02/10/2024    HEMOGLOBIN A1C  03/29/2024    COVID-19 Vaccine (4 - 2023-24 season) 06/24/2024 (Originally 9/1/2023)    RSV Vaccine - Adults (1 - 1-dose 60+ series) 04/04/2025 (Originally 8/13/1997)    INFLUENZA VACCINE  08/01/2024    TDAP/TD VACCINES (2 - Td or Tdap) 04/10/2029    Pneumococcal Vaccine 65+  Completed                CMS Preventative Services Quick Reference  Risk Factors Identified During Encounter:    Chronic Pain: OTC analgesics as needed. Proper dosing schedule discussed.   Polypharmacy: Medication List reviewed and Medications are appropriate for patient  Urinary Incontinence:  uses diapers    The above risks/problems have been discussed with the patient.  Pertinent information has been shared with the patient in the After Visit Summary.    Diagnoses and all orders for this visit:    1. Incontinence of feces with fecal urgency (Primary)  -     Miscellaneous DME    2. Essential hypertension stable continue with current meds continue with the dietary restrictions follow A1c    3. Type 2 diabetes mellitus with hyperglycemia, with long-term current use of insulin on Jardiance along with Trulicity continue Lantus at bedtime    4. Stage 3b chronic kidney disease encouraged adequate fluid intake follow electrolytes and renal function        Follow Up:   Next Medicare Wellness visit to be scheduled in 1 year.      An After Visit Summary and PPPS were made available to the patient.

## 2024-04-18 ENCOUNTER — OFFICE VISIT (OUTPATIENT)
Dept: ENDOCRINOLOGY | Facility: CLINIC | Age: 89
End: 2024-04-18
Payer: MEDICARE

## 2024-04-18 VITALS
BODY MASS INDEX: 32.14 KG/M2 | SYSTOLIC BLOOD PRESSURE: 138 MMHG | WEIGHT: 200 LBS | OXYGEN SATURATION: 98 % | DIASTOLIC BLOOD PRESSURE: 68 MMHG | HEIGHT: 66 IN | HEART RATE: 85 BPM

## 2024-04-18 DIAGNOSIS — E11.65 TYPE 2 DIABETES MELLITUS WITH HYPERGLYCEMIA, WITH LONG-TERM CURRENT USE OF INSULIN: Primary | ICD-10-CM

## 2024-04-18 DIAGNOSIS — Z79.4 TYPE 2 DIABETES MELLITUS WITH HYPERGLYCEMIA, WITH LONG-TERM CURRENT USE OF INSULIN: Primary | ICD-10-CM

## 2024-04-18 LAB
EXPIRATION DATE: ABNORMAL
GLUCOSE BLDC GLUCOMTR-MCNC: 394 MG/DL (ref 70–130)
Lab: ABNORMAL

## 2024-04-18 PROCEDURE — 99214 OFFICE O/P EST MOD 30 MIN: CPT | Performed by: PHYSICIAN ASSISTANT

## 2024-04-18 PROCEDURE — 82947 ASSAY GLUCOSE BLOOD QUANT: CPT | Performed by: PHYSICIAN ASSISTANT

## 2024-04-18 NOTE — PROGRESS NOTES
Office Note      Date: 2024  Patient Name: Blane Dietz  MRN: [id number]  : 1935    Chief Complaint   Patient presents with    Diabetes     Type II       History of Present Illness:   Blane Dietz is a 88 y.o. male who presents for Diabetes type 2.   Current RX: Trulicity 1.5 mg weekly, jardiance 25 mg, Lantus 25 units    Bg checks are done: rarely; a month ago morning BG was 242  Hypoglycemia : none    He is interested in getting CGM to help with closer monitoring of BG. Does not have a smart phone but planning to get a new phone. Has someone at the assisted living that can help with setting up th CGM.    Notes that he does miss his dose of Trulicity at least once a month, maybe more. Takes jardiance and lantus daily.    Last A1c:  Hemoglobin A1C   Date Value Ref Range Status   2024 10.3 (A) 4.5 - 5.7 % Final   10/25/2022 8.60 (H) 4.80 - 5.60 % Final       Changes in health since last visit: none.     DM Health Maintenance:  Ophtho: 2024; no diabetic retinopathy per patient  Monofilament / Foot exam: sees podiatry; 24   Lipids/Statin: taking a statin with last FLP showing LDL due  KRYS: 2024  TSH: due  Aspirin: not taking  ACE/ARB: valsartan (entresto)    Subjective      Diabetic Complications:  Eyes: No  Kidneys: Yes, describe: CKD stage 3  Feet: No  Heart: No; no CAD, has CHF        Review of Systems:   Review of Systems   Constitutional:  Negative for activity change and appetite change.   Cardiovascular:  Negative for chest pain and palpitations.   Gastrointestinal:  Negative for constipation, diarrhea and nausea.   Musculoskeletal:  Positive for arthralgias and gait problem.   Neurological:  Negative for dizziness, weakness and numbness.       The following portions of the patient's history were reviewed and updated as appropriate: allergies, current medications, past family history, past medical history, past social history, past surgical history, and problem  "list.    Objective     Visit Vitals  /68 (BP Location: Left arm, Patient Position: Sitting, Cuff Size: Adult)   Pulse 85   Ht 167.6 cm (66\")   Wt 90.7 kg (200 lb)   SpO2 98%   BMI 32.28 kg/m²           Physical Exam:  Physical Exam  Constitutional:       Appearance: He is well-developed.   HENT:      Head: Normocephalic and atraumatic.      Right Ear: External ear normal.      Left Ear: External ear normal.   Eyes:      Conjunctiva/sclera: Conjunctivae normal.   Cardiovascular:      Rate and Rhythm: Normal rate and regular rhythm.      Pulses:           Dorsalis pedis pulses are 1+ on the right side and 1+ on the left side.   Pulmonary:      Effort: Pulmonary effort is normal.      Breath sounds: Normal breath sounds.   Musculoskeletal:         General: Normal range of motion.      Cervical back: Normal range of motion.   Feet:      Right foot:      Protective Sensation: 10 sites tested.  10 sites sensed.      Skin integrity: Dry skin present.      Toenail Condition: Right toenails are abnormally thick. Fungal disease present.     Left foot:      Protective Sensation: 10 sites tested.  10 sites sensed.      Skin integrity: Dry skin present.      Toenail Condition: Left toenails are abnormally thick. Fungal disease present.     Comments: Diabetic Foot Exam Performed and Monofilament Test Performed      Skin:     General: Skin is warm and dry.   Psychiatric:         Behavior: Behavior normal.          Assessment / Plan      Assessment & Plan:  Diagnoses and all orders for this visit:    1. Type 2 diabetes mellitus with hyperglycemia, with long-term current use of insulin (Primary)  Assessment & Plan:  Diabetes is improving with treatment.   Medication changes per orders.  Reminded to bring in blood sugar diary at next visit.  Recommended an ADA diet.  Regular aerobic exercise.  Discussed foot care.    Will work on getting patient a CGM through medical supply company.     Increase Lantus to 27 units qhs. When " wearing CGM consistently, can work on titrating the dose further to bring morning BG consistently below 200.    Work on taking Trulicity more consistently. Discussed increasing dose, but afraid he will have SE if he is taking it intermittently.    Diabetes will be reassessed in 3 months    Orders:  -     POC Glucose, Blood        Return in about 3 months (around 7/18/2024) for Follow up with Dr. Lainez.    Portions of this note were completed with voice recognition program.  Electronically signed by Sophia Ferreira PA-C  [facility] [place]  04/18/2024

## 2024-04-18 NOTE — PATIENT INSTRUCTIONS
We will work on ordering a continuous glucose monitor (Josefa or Dexcom) through medical supply company.     Increase Lantus to 27 units. Check morning blood sugars for the next week to make sure blood sugar is staying above 100. Can increase by another 2 units with goal of getting fasting blood sugar below 200.

## 2024-04-19 NOTE — ASSESSMENT & PLAN NOTE
Diabetes is improving with treatment.   Medication changes per orders.  Reminded to bring in blood sugar diary at next visit.  Recommended an ADA diet.  Regular aerobic exercise.  Discussed foot care.    Will work on getting patient a CGM through medical supply company.     Increase Lantus to 27 units qhs. When wearing CGM consistently, can work on titrating the dose further to bring morning BG consistently below 200.    Work on taking Trulicity more consistently. Discussed increasing dose, but afraid he will have SE if he is taking it intermittently.    Diabetes will be reassessed in 3 months

## 2024-04-23 NOTE — PROGRESS NOTES
Patient: Blane Dietz    YOB: 1935    Date: 04/29/2024    Primary Care Provider: David Em MD    Chief Complaint   Patient presents with    Skin Lesion     left buttock       SUBJECTIVE:    History of present illness:  The patient is in the office today for evaluation and treatment of a lesion on right buttock. The lesion is painful occasionally, mild in severity an has been present for 6 months. He has a history of actinic Keratoses and previous skin cancer, history of squamous cell carcinoma. He denies drainage from the area. He has not had it biopsied.     The following portions of the patient's history were reviewed and updated as appropriate: allergies, current medications, past family history, past medical history, past social history, past surgical history and problem list.      Review of Systems   Constitutional:  Negative for chills, fever and unexpected weight change.   HENT:  Negative for trouble swallowing and voice change.    Eyes:  Negative for visual disturbance.   Respiratory:  Negative for apnea, cough, chest tightness, shortness of breath and wheezing.    Cardiovascular:  Negative for chest pain, palpitations and leg swelling.   Gastrointestinal:  Negative for abdominal distention, abdominal pain, anal bleeding, blood in stool, constipation, diarrhea, nausea, rectal pain and vomiting.   Endocrine: Negative for cold intolerance and heat intolerance.   Genitourinary:  Negative for difficulty urinating, dysuria, flank pain, scrotal swelling and testicular pain.   Musculoskeletal:  Negative for back pain, gait problem and joint swelling.   Skin:  Negative for color change, rash and wound.   Neurological:  Negative for dizziness, syncope, speech difficulty, weakness, numbness and headaches.   Hematological:  Negative for adenopathy. Does not bruise/bleed easily.   Psychiatric/Behavioral:  Negative for confusion. The patient is not nervous/anxious.        Allergies:  Allergies  "  Allergen Reactions    Ampicillin Anaphylaxis    Medrol [Methylprednisolone] Unknown - High Severity     Made his blood sugar get really high, can't take this    Myrbetriq [Mirabegron] Unknown - High Severity     High BP    Penicillins Anaphylaxis and Shortness Of Breath     Beta lactam allergy details  Antibiotic reaction: (!) shortness of breath  Age at reaction: adult  Dose to reaction time: (!) minutes  Reason for antibiotic: other  Epinephrine required for reaction?: (!) yes           Bee Venom Swelling    Melatonin Hallucinations       Medications:    Current Outpatient Medications:     allopurinol (ZYLOPRIM) 100 MG tablet, Take 1 tablet by mouth Daily., Disp: 90 tablet, Rfl: 3    amiodarone (PACERONE) 200 MG tablet, Take 1 tablet by mouth Daily., Disp: , Rfl:     Calcium Carb-Cholecalciferol 600-5 MG-MCG tablet, Take 1 tablet by mouth Daily., Disp: 30 tablet, Rfl: 3    Droplet Insulin Syringe 31G X 5/16\" 0.5 ML misc, , Disp: , Rfl:     Dulaglutide (Trulicity) 1.5 MG/0.5ML solution pen-injector, Inject 1.5 mg under the skin into the appropriate area as directed 1 (One) Time Per Week. Takes on Wednesdays, Disp: 7 mL, Rfl: 2    empagliflozin (Jardiance) 25 MG tablet tablet, Take 1 tablet by mouth Daily., Disp: 90 tablet, Rfl: 3    Entresto 24-26 MG tablet, TAKE ONE TABLET BY MOUTH EVERY 12 HOURS, Disp: 180 tablet, Rfl: 3    enzalutamide (XTANDI) 40 MG tablet tablet, Take 3 tablets by mouth Daily., Disp: 90 tablet, Rfl: 11    fluticasone (FLONASE) 50 MCG/ACT nasal spray, 2 sprays into the nostril(s) as directed by provider Daily. 2 puffs each nostril (Patient taking differently: 2 sprays into the nostril(s) as directed by provider Daily As Needed for Rhinitis or Allergies.), Disp: 1 bottle, Rfl: 0    ketoconazole (NIZORAL) 2 % shampoo, Apply 1 application  topically to the appropriate area as directed 2 (Two) Times a Week. Indications: Tinea Versicolor, Disp: , Rfl:     Lantus 100 UNIT/ML injection, Inject up to " 50 units daily subcutaneously (Patient taking differently: Inject up to 20 units daily subcutaneously), Disp: 20 mL, Rfl: 5    metoprolol succinate XL (TOPROL-XL) 25 MG 24 hr tablet, Take 0.5 tablets by mouth Daily for 360 days., Disp: 45 tablet, Rfl: 3    multivitamin with minerals tablet tablet, Take 1 tablet by mouth Daily. AREDS 2  Indications: vitamin deficiency, Disp: , Rfl:     pravastatin (PRAVACHOL) 40 MG tablet, TAKE ONE TABLET BY MOUTH EVERY NIGHT AT BEDTIME, Disp: 90 tablet, Rfl: 3    triamcinolone (KENALOG) 0.1 % cream, Apply 1 Application topically to the appropriate area as directed 2 (Two) Times a Day., Disp: , Rfl:     History:  Past Medical History:   Diagnosis Date    Aortic stenosis     status post ROSS procedure, remote, with December 2015 echocardiography revealing normal aortic and pulmonary valve function, normal ejection fraction and mild to moderate MR    Arthritis     Bilateral impacted cerumen 11/23/2016    Bronchitis     CHF (congestive heart failure)     Chronic gout of right foot 06/13/2016    Impression: 03/08/2016 - stable.;     COVID-19 vaccine series completed 05/12/2021    [place] 2nd dose 3/12/21.    Depression     Diabetes mellitus     Diverticulitis     Exogenous obesity     Gout     Heart murmur     History of vitamin D deficiency     Hypercholesteremia 08/11/2016    Hyperlipidemia     Hypertension     Kidney lesion     Malignant neoplasm of prostate     Morbid obesity 08/11/2016    Pneumonia 05/12/2021    Primary osteoarthritis involving multiple joints 06/13/2016    Impression: 03/08/2016 - stable;     Pulmonary embolism     Sleep apnea 05/12/2021    C-Pap    Uncontrolled type 2 diabetes mellitus with hyperglycemia 06/13/2016    Impression: 03/08/2016 - seeing Endo .;     Vertigo        Past Surgical History:   Procedure Laterality Date    CARDIAC VALVE REPLACEMENT  05/12/2021    Ross procedure 22 years ago    COLON SURGERY      COLONOSCOPY      COLOSTOMY  1999    COLOSTOMY  "REVISION      CYSTOSCOPY N/A 12/7/2021    Procedure: CYSTOSCOPY FLEXIBLE;  Surgeon: José Miguel Villafuerte Jr., MD;  Location:  [facility];  Service: Urology;  Laterality: N/A;    EXPLORATORY LAPAROTOMY      With Tissue Removal    LIPOMA EXCISION      MOHS SURGERY      NEPHROURETERECTOMY Right 12/7/2021    Procedure: RIGHT NEPHROURETERECTOMY LAPAROSCOPIC WITH DAVINCI ROBOT;  Surgeon: José Miguel Villafuerte Jr., MD;  Location:  [facility];  Service: Robotics - DaVinci;  Laterality: Right;    OTHER SURGICAL HISTORY      Porcine Valve    PROSTATE SURGERY      PROSTATECTOMY  2000     History of Prostatectomy Perineal Radical    SKIN CANCER EXCISION      from head and lip    TONSILLECTOMY         Family History   Problem Relation Age of Onset    Diabetes Mother     Lung cancer Mother     Cancer Mother     Heart disease Father     Breast cancer Other     Cancer Other     Hypertension Other     Migraines Other     Obesity Other     Diabetes Other        Social History     Tobacco Use    Smoking status: Never     Passive exposure: Never    Smokeless tobacco: Never   Vaping Use    Vaping status: Never Used   Substance Use Topics    Alcohol use: No    Drug use: No        OBJECTIVE:    Vital Signs:   Vitals:    04/29/24 1305   BP: 138/68   Pulse: 89   Resp: 18   Temp: 97.7 °F (36.5 °C)   TempSrc: Temporal   SpO2: 96%   Weight: 88.5 kg (195 lb 3.2 oz)   Height: 167.6 cm (66\")       Physical Exam:   General Appearance:    Alert, cooperative, in no acute distress   Head:    Normocephalic, without obvious abnormality, atraumatic   Eyes:            Lids and lashes normal, conjunctivae and sclerae normal, no   icterus, no pallor, corneas clear, PERRLA   Ears:    Ears appear intact with no abnormalities noted   Throat:   No oral lesions, no thrush, oral mucosa moist   Neck:   No adenopathy, supple, trachea midline, no thyromegaly, no   carotid bruit, no JVD   Lungs:     Clear to auscultation,respirations regular, even and                  " unlabored    Heart:    Regular rhythm and normal rate, normal S1 and S2, no            murmur, no gallop, no rub, no click   Chest Wall:    No abnormalities observed   Abdomen:     Normal bowel sounds, no masses, no organomegaly, soft        non-tender, non-distended, no guarding, no rebound                tenderness   Extremities:   Moves all extremities well, no edema, no cyanosis, no             redness   Pulses:   Pulses palpable and equal bilaterally   Skin:   Right gluteal skin lesion, firm, central ulceration, erythematous and raised   Lymph nodes:   No palpable adenopathy   Neurologic:   Cranial nerves 2 - 12 grossly intact, sensation intact, DTR       present and equal bilaterally     Results Review:   I reviewed the patient's new clinical results.    Review of Systems was reviewed and confirmed as accurate as documented by the MA.    ASSESSMENT/PLAN:    1. Skin lesion      Patient was referred to me for a raised lesion on his right buttock. I recommend punch biopsy for tissue pathology. The procedure and risks including bleeding, infection, damage to surrounding structures, and need for additional procedures was discussed. He is agreeable and all questions were answered. I will call him with the results of his pathology.     I discussed the patients findings and my recommendations with patient    Electronically signed by Brionna Weinstein DO  04/29/24    Procedure:     I recommended punch biopsy. I explained the indication as well as the risks and benefits which include bleeding, infection requiring additional procedures, non healing of the wound etc. The patient understands these and wishes to proceed.    The patient was brought to the procedure room. Consent and time out were performed. The area was prepped and draped in the usual fashion. 1% lidocaine with epinephrine was infused locally. Using a punch biopsy, a biopsy was obtained of the lesion and sent to pathology as specimen. Pressure was held and  hemostasis was assured. Dressing was placed.

## 2024-04-24 ENCOUNTER — OFFICE VISIT (OUTPATIENT)
Dept: ONCOLOGY | Facility: CLINIC | Age: 89
End: 2024-04-24
Payer: MEDICARE

## 2024-04-24 ENCOUNTER — HOSPITAL ENCOUNTER (OUTPATIENT)
Dept: INFUSION THERAPY | Facility: HOSPITAL | Age: 89
Discharge: HOME | End: 2024-04-24
Admitting: INTERNAL MEDICINE
Payer: MEDICARE

## 2024-04-24 VITALS
WEIGHT: 200 LBS | HEIGHT: 66 IN | DIASTOLIC BLOOD PRESSURE: 71 MMHG | OXYGEN SATURATION: 97 % | TEMPERATURE: 97.1 F | SYSTOLIC BLOOD PRESSURE: 155 MMHG | BODY MASS INDEX: 32.14 KG/M2 | RESPIRATION RATE: 16 BRPM | HEART RATE: 97 BPM

## 2024-04-24 DIAGNOSIS — C61 PROSTATE CANCER: ICD-10-CM

## 2024-04-24 DIAGNOSIS — C83.39 DIFFUSE LARGE B-CELL LYMPHOMA OF SOLID ORGAN EXCLUDING SPLEEN: Primary | ICD-10-CM

## 2024-04-24 DIAGNOSIS — C61 PROSTATE CANCER: Primary | ICD-10-CM

## 2024-04-24 LAB
ALBUMIN SERPL-MCNC: 4.1 G/DL (ref 3.5–5.2)
ALBUMIN/GLOB SERPL: 1.6 G/DL
ALP SERPL-CCNC: 98 U/L (ref 39–117)
ALT SERPL W P-5'-P-CCNC: 21 U/L (ref 1–41)
ANION GAP SERPL CALCULATED.3IONS-SCNC: 11.6 MMOL/L (ref 5–15)
AST SERPL-CCNC: 28 U/L (ref 1–40)
BASOPHILS # BLD AUTO: 0.16 10*3/MM3 (ref 0–0.2)
BASOPHILS NFR BLD AUTO: 1.6 % (ref 0–1.5)
BILIRUB SERPL-MCNC: 0.8 MG/DL (ref 0–1.2)
BUN SERPL-MCNC: 21 MG/DL (ref 8–23)
BUN/CREAT SERPL: 14.7 (ref 7–25)
CALCIUM SPEC-SCNC: 8.5 MG/DL (ref 8.6–10.5)
CHLORIDE SERPL-SCNC: 102 MMOL/L (ref 98–107)
CO2 SERPL-SCNC: 23.4 MMOL/L (ref 22–29)
CREAT SERPL-MCNC: 1.43 MG/DL (ref 0.76–1.27)
DEPRECATED RDW RBC AUTO: 46.6 FL (ref 37–54)
EGFRCR SERPLBLD CKD-EPI 2021: 47.7 ML/MIN/1.73
EOSINOPHIL # BLD AUTO: 0.36 10*3/MM3 (ref 0–0.4)
EOSINOPHIL NFR BLD AUTO: 3.5 % (ref 0.3–6.2)
ERYTHROCYTE [DISTWIDTH] IN BLOOD BY AUTOMATED COUNT: 14.2 % (ref 12.3–15.4)
GLOBULIN UR ELPH-MCNC: 2.6 GM/DL
GLUCOSE SERPL-MCNC: 164 MG/DL (ref 65–99)
HCT VFR BLD AUTO: 43.4 % (ref 37.5–51)
HGB BLD-MCNC: 14.5 G/DL (ref 13–17.7)
IMM GRANULOCYTES # BLD AUTO: 0.21 10*3/MM3 (ref 0–0.05)
IMM GRANULOCYTES NFR BLD AUTO: 2.1 % (ref 0–0.5)
LYMPHOCYTES # BLD AUTO: 1.17 10*3/MM3 (ref 0.7–3.1)
LYMPHOCYTES NFR BLD AUTO: 11.5 % (ref 19.6–45.3)
MAGNESIUM SERPL-MCNC: 2.6 MG/DL (ref 1.6–2.4)
MCH RBC QN AUTO: 30.1 PG (ref 26.6–33)
MCHC RBC AUTO-ENTMCNC: 33.4 G/DL (ref 31.5–35.7)
MCV RBC AUTO: 90.2 FL (ref 79–97)
MONOCYTES # BLD AUTO: 0.74 10*3/MM3 (ref 0.1–0.9)
MONOCYTES NFR BLD AUTO: 7.3 % (ref 5–12)
NEUTROPHILS NFR BLD AUTO: 7.53 10*3/MM3 (ref 1.7–7)
NEUTROPHILS NFR BLD AUTO: 74 % (ref 42.7–76)
NRBC BLD AUTO-RTO: 0 /100 WBC (ref 0–0.2)
PHOSPHATE SERPL-MCNC: 3.6 MG/DL (ref 2.5–4.5)
PLATELET # BLD AUTO: 113 10*3/MM3 (ref 140–450)
PMV BLD AUTO: 10.5 FL (ref 6–12)
POTASSIUM SERPL-SCNC: 4.5 MMOL/L (ref 3.5–5.2)
PROT SERPL-MCNC: 6.7 G/DL (ref 6–8.5)
RBC # BLD AUTO: 4.81 10*6/MM3 (ref 4.14–5.8)
SODIUM SERPL-SCNC: 137 MMOL/L (ref 136–145)
WBC NRBC COR # BLD AUTO: 10.17 10*3/MM3 (ref 3.4–10.8)

## 2024-04-24 PROCEDURE — 80053 COMPREHEN METABOLIC PANEL: CPT | Performed by: INTERNAL MEDICINE

## 2024-04-24 PROCEDURE — 1126F AMNT PAIN NOTED NONE PRSNT: CPT | Performed by: INTERNAL MEDICINE

## 2024-04-24 PROCEDURE — 25010000002 DENOSUMAB 120 MG/1.7ML SOLUTION: Performed by: INTERNAL MEDICINE

## 2024-04-24 PROCEDURE — 84100 ASSAY OF PHOSPHORUS: CPT | Performed by: INTERNAL MEDICINE

## 2024-04-24 PROCEDURE — 84153 ASSAY OF PSA TOTAL: CPT | Performed by: INTERNAL MEDICINE

## 2024-04-24 PROCEDURE — 99214 OFFICE O/P EST MOD 30 MIN: CPT | Performed by: INTERNAL MEDICINE

## 2024-04-24 PROCEDURE — 85025 COMPLETE CBC W/AUTO DIFF WBC: CPT | Performed by: INTERNAL MEDICINE

## 2024-04-24 PROCEDURE — 25010000002 LEUPROLIDE 7.5 MG KIT: Performed by: INTERNAL MEDICINE

## 2024-04-24 PROCEDURE — 96402 CHEMO HORMON ANTINEOPL SQ/IM: CPT

## 2024-04-24 PROCEDURE — 36415 COLL VENOUS BLD VENIPUNCTURE: CPT

## 2024-04-24 PROCEDURE — 96372 THER/PROPH/DIAG INJ SC/IM: CPT

## 2024-04-24 PROCEDURE — 83735 ASSAY OF MAGNESIUM: CPT | Performed by: INTERNAL MEDICINE

## 2024-04-24 RX ADMIN — LEUPROLIDE ACETATE 7.5 MG: 7.5 INJECTION, SUSPENSION, EXTENDED RELEASE SUBCUTANEOUS at 14:59

## 2024-04-24 RX ADMIN — DENOSUMAB 120 MG: 120 INJECTION SUBCUTANEOUS at 14:59

## 2024-04-24 NOTE — PROGRESS NOTES
DATE OF VISIT: 4/24/2024    REASON FOR VISIT: Followup for: 1.  Diffuse large B-cell lymphoma 2.  Prostate cancer     PROBLEM LIST:  1. Prostate cancer, initially presented as [id number] status post radical  prostatectomy 2000.  A.  Patient is on Lupron plus Prolia  B.  Tried to add apalutamide second rising PSA patient declined.  C.  Whole-body PSMA PET scan done September 2023 revealed 2 separate areas of uptake in the skeleton at the L1 left transverse process and close to cartilage junction of the right third anterior rib.  D.  We will start Eligard with Xtandi September 20, 2023  2.  Right-sided pulmonary embolisms:  A.  Currently on Eliquis twice a day  3.  Type 2 diabetes  4. Hhypertension  5. Hypercholesterolemia.  6.  Right subsegmental PE:  A. Started on Eliquis twice a day March 2021  7.  Osteopenia  8.  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  Status post right nephrectomy December 7, 2021  B.  Started adjuvant chemotherapy R-CHOP January 14, 2022, status post 3 cycles completed February 23, 2022      HISTORY OF PRESENT ILLNESS: The patient is a very pleasant 88 y.o. male  with past medical history significant for diffuse large B-cell lymphoma right kidney diagnosed December 2021.  Patient status post 3 cycles of chemotherapy with R-CHOP.  Repeat PET scan showed no evidence of residual disease. The patient is here today for scheduled follow-up visit.    SUBJECTIVE: Geovanny is here today by himself.  He denies any fever chills or night sweats.  He is having much better sugar control.  He did adjust his diet.    Past History:  Medical History: has a past medical history of Aortic stenosis, Arthritis, Bilateral impacted cerumen (11/23/2016), Bronchitis, CHF (congestive heart failure), Chronic gout of right foot (06/13/2016), COVID-19 vaccine series completed (05/12/2021), Depression, Diabetes mellitus, Diverticulitis, Exogenous obesity, Gout, Heart murmur, History of vitamin D deficiency,  Hypercholesteremia (08/11/2016), Hyperlipidemia, Hypertension, Kidney lesion, Malignant neoplasm of prostate, Morbid obesity (08/11/2016), Pneumonia (05/12/2021), Primary osteoarthritis involving multiple joints (06/13/2016), Pulmonary embolism, Sleep apnea (05/12/2021), Uncontrolled type 2 diabetes mellitus with hyperglycemia (06/13/2016), and Vertigo.   Surgical History: has a past surgical history that includes Colostomy (1999); Other surgical history; Exploratory laparotomy; Colonoscopy; Revision Colostomy; Prostate surgery; Prostatectomy (2000); Colon surgery; Tonsillectomy; Skin cancer excision; Lipoma Excision; Mohs surgery; Cardiac valve replacement (05/12/2021); nephroureterectomy (Right, 12/7/2021); and Cystoscopy (N/A, 12/7/2021).   Family History: family history includes Breast cancer in an other family member; Cancer in his mother and another family member; Diabetes in his mother and another family member; Heart disease in his father; Hypertension in an other family member; Lung cancer in his mother; Migraines in an other family member; Obesity in an other family member.   Social History: reports that he has never smoked. He has never been exposed to tobacco smoke. He has never used smokeless tobacco. He reports that he does not drink alcohol and does not use drugs.    (Not in a hospital admission)     Allergies: Ampicillin, Medrol [methylprednisolone], Myrbetriq [mirabegron], Penicillins, Bee venom, and Melatonin     Review of Systems   Constitutional:  Positive for fatigue.   Respiratory:  Positive for shortness of breath.    Musculoskeletal:  Positive for arthralgias.         Current Outpatient Medications:     allopurinol (ZYLOPRIM) 100 MG tablet, Take 1 tablet by mouth Daily., Disp: 90 tablet, Rfl: 3    amiodarone (PACERONE) 200 MG tablet, Take 1 tablet by mouth Daily., Disp: , Rfl:     Calcium Carb-Cholecalciferol 600-5 MG-MCG tablet, Take 1 tablet by mouth Daily., Disp: 30 tablet, Rfl: 3     "Dulaglutide (Trulicity) 1.5 MG/0.5ML solution pen-injector, Inject 1.5 mg under the skin into the appropriate area as directed 1 (One) Time Per Week. Takes on Wednesdays, Disp: 7 mL, Rfl: 2    empagliflozin (Jardiance) 25 MG tablet tablet, Take 1 tablet by mouth Daily., Disp: 90 tablet, Rfl: 3    Entresto 24-26 MG tablet, TAKE ONE TABLET BY MOUTH EVERY 12 HOURS, Disp: 180 tablet, Rfl: 3    enzalutamide (XTANDI) 40 MG tablet tablet, Take 3 tablets by mouth Daily., Disp: 90 tablet, Rfl: 11    fluticasone (FLONASE) 50 MCG/ACT nasal spray, 2 sprays into the nostril(s) as directed by provider Daily. 2 puffs each nostril (Patient taking differently: 2 sprays into the nostril(s) as directed by provider Daily As Needed for Rhinitis or Allergies.), Disp: 1 bottle, Rfl: 0    ketoconazole (NIZORAL) 2 % shampoo, Apply 1 application  topically to the appropriate area as directed 2 (Two) Times a Week. Indications: Tinea Versicolor, Disp: , Rfl:     Lantus 100 UNIT/ML injection, Inject up to 50 units daily subcutaneously (Patient taking differently: Inject up to 20 units daily subcutaneously), Disp: 20 mL, Rfl: 5    metoprolol succinate XL (TOPROL-XL) 25 MG 24 hr tablet, Take 0.5 tablets by mouth Daily for 360 days., Disp: 45 tablet, Rfl: 3    multivitamin with minerals tablet tablet, Take 1 tablet by mouth Daily. AREDS 2  Indications: vitamin deficiency, Disp: , Rfl:     pravastatin (PRAVACHOL) 40 MG tablet, TAKE ONE TABLET BY MOUTH EVERY NIGHT AT BEDTIME, Disp: 90 tablet, Rfl: 3    PHYSICAL EXAMINATION:   /71   Pulse 97   Temp 97.1 °F (36.2 °C)   Resp 16   Ht 167.6 cm (66\")   Wt 90.7 kg (200 lb)   SpO2 97%   BMI 32.28 kg/m²    Pain Score    04/24/24 1333   PainSc: 0-No pain                           ECOG Performance Status: 2 - Symptomatic, <50% confined to bed  General Appearance:  alert, cooperative, no apparent distress and appears stated age   Neurologic/Psychiatric: A&O x 3, gait steady, appropriate affect, " strength 5/5 in all muscle groups   HEENT:  Normocephalic, without obvious abnormality, mucous membranes moist   Neck: Supple, symmetrical, trachea midline, no adenopathy;  No thyromegaly, masses, or tenderness   Lungs:   Clear to auscultation bilaterally; respirations regular, even, and unlabored bilaterally   Heart:  Regular rate and rhythm, no murmurs appreciated   Abdomen:   Soft, non-tender, non-distended, and no organomegaly   Lymph nodes: No cervical, supraclavicular, inguinal or axillary adenopathy noted   Extremities:  +2 bilateral lower extremities edema    Skin: No rashes, ulcers, or suspicious lesions noted     Office Visit on 04/18/2024   Component Date Value Ref Range Status    Glucose 04/18/2024 394 (A)  70 - 130 mg/dL Final    Lot Number 04/18/2024 [id number]   Final    Expiration Date 04/18/2024 10/17/24   Final        No results found.    ASSESSMENT: The patient is a very pleasant 88 y.o. male  with diffuse large B-cell lymphoma      PLAN:    1.  Diffuse large B-cell lymphoma stage Ia:  A.  I will order scans prior to return.  His last PET scan done September 7, 2023    2.  Prostate cancer:  A.  I will continue the patient on Lupron monthly.  B. I will continue the patient on Xtandi 120 mg daily.  We will consider increasing the dose to full dose of 160 mg daily if he is doing well.  C.  I did go over his PSA from 3/24/2024 reassurance was normal and continued to decline-0.18.    3.  Bone metastases:  A.  I will continue the patient on Xgeva 120 mg subcu every 4 weeks.  B.  I will switch him to every 3 months after the first year.  Will be due September 2024.  C.  I will continue the patient on calcium and vitamin D daily.    4.  Right-sided pulmonary embolism:  A. The patient has completed 1 year of treatment.  His repeated CT PE protocol in June 2022 did not reveal any evidence of recurrent PEs.    5.  Hypercholesteremia:  A.  The patient continue pravastatin daily.    6.  Type 2 diabetes:  A.   The patient will continue [place] and Trulicity.    FOLLOW UP: 1 month with Eligard treatment, repeated scans as well as blood work.    Shannan Ramon MD  4/24/2024

## 2024-04-25 ENCOUNTER — SPECIALTY PHARMACY (OUTPATIENT)
Dept: ONCOLOGY | Facility: HOSPITAL | Age: 89
End: 2024-04-25
Payer: MEDICARE

## 2024-04-25 LAB — PSA SERPL-MCNC: 0.15 NG/ML (ref 0–4)

## 2024-04-26 ENCOUNTER — SPECIALTY PHARMACY (OUTPATIENT)
Dept: ONCOLOGY | Facility: HOSPITAL | Age: 89
End: 2024-04-26
Payer: MEDICARE

## 2024-04-26 NOTE — PROGRESS NOTES
"Specialty Pharmacy Refill Coordination Note     Blane \"CHEO\" is a 88 y.o. male contacted today regarding refills of  xtandi 40mg 3 tablets QD specialty medication(s).    Reviewed and verified with patient: YES  Specialty medication(s) and dose(s) confirmed: yes    Refill Questions      Flowsheet Row Most Recent Value   Changes to allergies? No   Changes to medications? No   New conditions or infections since last clinic visit No   Unplanned office visit, urgent care, ED, or hospital admission in the last 4 weeks  Yes  [ jose martin for \"bump\" removal]   How does patient/caregiver feel medication is working? Good   Financial problems or insurance changes  No   Since the previous refill, were any specialty medication doses or scheduled injections missed or delayed?  No   Does this patient require a clinical escalation to a pharmacist? No            Delivery Questions      Flowsheet Row Most Recent Value   Delivery method [employer]Ex   Delivery address verified with patient/caregiver? Yes   Delivery address Temporary   Number of medications in delivery 1   Medication(s) being filled and delivered Enzalutamide   Doses left of specialty medications 15 tablets   Copay verified? Yes   Copay amount 38.39   Copay form of payment Credit/debit on file                   Follow-up: 3 week(s)     Blas Calle, Pharmacy Technician  Specialty Pharmacy Technician        "

## 2024-04-29 ENCOUNTER — OFFICE VISIT (OUTPATIENT)
Dept: SURGERY | Facility: CLINIC | Age: 89
End: 2024-04-29
Payer: MEDICARE

## 2024-04-29 VITALS
HEART RATE: 89 BPM | HEIGHT: 66 IN | BODY MASS INDEX: 31.37 KG/M2 | SYSTOLIC BLOOD PRESSURE: 138 MMHG | DIASTOLIC BLOOD PRESSURE: 68 MMHG | OXYGEN SATURATION: 96 % | WEIGHT: 195.2 LBS | TEMPERATURE: 97.7 F | RESPIRATION RATE: 18 BRPM

## 2024-04-29 DIAGNOSIS — L98.9 SKIN LESION: Primary | ICD-10-CM

## 2024-04-29 PROCEDURE — 1159F MED LIST DOCD IN RCRD: CPT | Performed by: STUDENT IN AN ORGANIZED HEALTH CARE EDUCATION/TRAINING PROGRAM

## 2024-04-29 PROCEDURE — 11104 PUNCH BX SKIN SINGLE LESION: CPT | Performed by: STUDENT IN AN ORGANIZED HEALTH CARE EDUCATION/TRAINING PROGRAM

## 2024-04-29 PROCEDURE — 99204 OFFICE O/P NEW MOD 45 MIN: CPT | Performed by: STUDENT IN AN ORGANIZED HEALTH CARE EDUCATION/TRAINING PROGRAM

## 2024-04-29 PROCEDURE — 1160F RVW MEDS BY RX/DR IN RCRD: CPT | Performed by: STUDENT IN AN ORGANIZED HEALTH CARE EDUCATION/TRAINING PROGRAM

## 2024-04-29 RX ORDER — SYRGE-NDL,INS 0.5 ML HALF MARK 31 GX5/16"
SYRINGE, EMPTY DISPOSABLE MISCELLANEOUS
COMMUNITY
Start: 2024-04-24

## 2024-04-29 RX ORDER — TRIAMCINOLONE ACETONIDE 1 MG/G
1 CREAM TOPICAL 2 TIMES DAILY
COMMUNITY

## 2024-04-30 ENCOUNTER — TELEPHONE (OUTPATIENT)
Dept: ENDOCRINOLOGY | Facility: CLINIC | Age: 89
End: 2024-04-30
Payer: MEDICARE

## 2024-04-30 RX ORDER — CALCIUM CITRATE/VITAMIN D3 200MG-6.25
TABLET ORAL
Qty: 300 EACH | Refills: 3 | Status: SHIPPED | OUTPATIENT
Start: 2024-04-30

## 2024-04-30 NOTE — TELEPHONE ENCOUNTER
"    Caller: Blane Dietz \"CHEO\"    Relationship: Self    Best call back number: [phone]    Requested Prescriptions:   TRUE METRICS TEST STRIPS     Pharmacy where request should be sent:  [facility] IN [place] - [phone]    Last office visit with prescribing clinician: 4/18/2024   Last telemedicine visit with prescribing clinician: Visit date not found   Next office visit with prescribing clinician: 8/1/2024     Additional details provided by patient: PT IS ALMOST OUT OF TEST STRIPS AND WOULD LIKE [facility] TO CALL THEM IN, PT HAS A 3 DAY SUPPLY.     Does the patient have less than a 3 day supply:  [] Yes  [x] No    Would you like a call back once the refill request has been completed: [x] Yes [] No    If the office needs to give you a call back, can they leave a voicemail: [] Yes [x] No    Cruz Perez Rep   04/30/24 10:11 EDT       "

## 2024-05-01 LAB — REF LAB TEST METHOD: NORMAL

## 2024-05-02 ENCOUNTER — TELEPHONE (OUTPATIENT)
Dept: SURGERY | Facility: CLINIC | Age: 89
End: 2024-05-02
Payer: MEDICARE

## 2024-05-02 NOTE — TELEPHONE ENCOUNTER
Attempted to call patient to inform him of his pathology results. One number no answer, voice mailbox full and could not leave message. Second number not in service. 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 It does not require further excision. I would recommend topical corticosteroids which I believe he was given by his PCP.

## 2024-05-03 NOTE — TELEPHONE ENCOUNTER
Patient called the MA line and Jaja advised him of the pathology results. He was thankful. He understood.

## 2024-05-20 ENCOUNTER — HOSPITAL ENCOUNTER (OUTPATIENT)
Dept: CT IMAGING | Facility: HOSPITAL | Age: 89
Discharge: HOME | End: 2024-05-20
Payer: MEDICARE

## 2024-05-20 DIAGNOSIS — C61 PROSTATE CANCER: ICD-10-CM

## 2024-05-20 DIAGNOSIS — C83.39 DIFFUSE LARGE B-CELL LYMPHOMA OF SOLID ORGAN EXCLUDING SPLEEN: ICD-10-CM

## 2024-05-20 PROCEDURE — 25510000001 IOPAMIDOL 61 % SOLUTION: Performed by: INTERNAL MEDICINE

## 2024-05-20 PROCEDURE — 74177 CT ABD & PELVIS W/CONTRAST: CPT

## 2024-05-20 PROCEDURE — 71260 CT THORAX DX C+: CPT

## 2024-05-20 RX ADMIN — IOPAMIDOL 100 ML: 612 INJECTION, SOLUTION INTRAVENOUS at 13:49

## 2024-05-22 ENCOUNTER — INFUSION (OUTPATIENT)
Dept: ONCOLOGY | Facility: HOSPITAL | Age: 89
End: 2024-05-22
Payer: MEDICARE

## 2024-05-22 ENCOUNTER — OFFICE VISIT (OUTPATIENT)
Dept: ONCOLOGY | Facility: CLINIC | Age: 89
End: 2024-05-22
Payer: MEDICARE

## 2024-05-22 ENCOUNTER — SPECIALTY PHARMACY (OUTPATIENT)
Dept: ONCOLOGY | Facility: HOSPITAL | Age: 89
End: 2024-05-22
Payer: MEDICARE

## 2024-05-22 VITALS
RESPIRATION RATE: 16 BRPM | DIASTOLIC BLOOD PRESSURE: 64 MMHG | TEMPERATURE: 98.3 F | HEIGHT: 66 IN | BODY MASS INDEX: 32.02 KG/M2 | WEIGHT: 199.2 LBS | OXYGEN SATURATION: 97 % | HEART RATE: 64 BPM | SYSTOLIC BLOOD PRESSURE: 141 MMHG

## 2024-05-22 DIAGNOSIS — C83.39 DIFFUSE LARGE B-CELL LYMPHOMA OF SOLID ORGAN EXCLUDING SPLEEN: Primary | ICD-10-CM

## 2024-05-22 DIAGNOSIS — C61 PROSTATE CANCER: ICD-10-CM

## 2024-05-22 DIAGNOSIS — C61 PROSTATE CANCER: Primary | ICD-10-CM

## 2024-05-22 LAB
ALBUMIN SERPL-MCNC: 3.9 G/DL (ref 3.5–5.2)
ALBUMIN/GLOB SERPL: 1.4 G/DL
ALP SERPL-CCNC: 94 U/L (ref 39–117)
ALT SERPL W P-5'-P-CCNC: 17 U/L (ref 1–41)
ANION GAP SERPL CALCULATED.3IONS-SCNC: 11.4 MMOL/L (ref 5–15)
AST SERPL-CCNC: 25 U/L (ref 1–40)
BASOPHILS # BLD AUTO: 0.14 10*3/MM3 (ref 0–0.2)
BASOPHILS NFR BLD AUTO: 1.8 % (ref 0–1.5)
BILIRUB SERPL-MCNC: 0.9 MG/DL (ref 0–1.2)
BUN SERPL-MCNC: 25 MG/DL (ref 8–23)
BUN/CREAT SERPL: 15.3 (ref 7–25)
CALCIUM SPEC-SCNC: 8.9 MG/DL (ref 8.6–10.5)
CHLORIDE SERPL-SCNC: 103 MMOL/L (ref 98–107)
CO2 SERPL-SCNC: 27.6 MMOL/L (ref 22–29)
CREAT SERPL-MCNC: 1.63 MG/DL (ref 0.76–1.27)
DEPRECATED RDW RBC AUTO: 47.8 FL (ref 37–54)
EGFRCR SERPLBLD CKD-EPI 2021: 40.8 ML/MIN/1.73
EOSINOPHIL # BLD AUTO: 0.42 10*3/MM3 (ref 0–0.4)
EOSINOPHIL NFR BLD AUTO: 5.4 % (ref 0.3–6.2)
ERYTHROCYTE [DISTWIDTH] IN BLOOD BY AUTOMATED COUNT: 14.3 % (ref 12.3–15.4)
GLOBULIN UR ELPH-MCNC: 2.8 GM/DL
GLUCOSE SERPL-MCNC: 247 MG/DL (ref 65–99)
HCT VFR BLD AUTO: 42.1 % (ref 37.5–51)
HGB BLD-MCNC: 13.7 G/DL (ref 13–17.7)
IMM GRANULOCYTES # BLD AUTO: 0.21 10*3/MM3 (ref 0–0.05)
IMM GRANULOCYTES NFR BLD AUTO: 2.7 % (ref 0–0.5)
LYMPHOCYTES # BLD AUTO: 1.39 10*3/MM3 (ref 0.7–3.1)
LYMPHOCYTES NFR BLD AUTO: 18 % (ref 19.6–45.3)
MAGNESIUM SERPL-MCNC: 2.6 MG/DL (ref 1.6–2.4)
MCH RBC QN AUTO: 30 PG (ref 26.6–33)
MCHC RBC AUTO-ENTMCNC: 32.5 G/DL (ref 31.5–35.7)
MCV RBC AUTO: 92.1 FL (ref 79–97)
MONOCYTES # BLD AUTO: 0.71 10*3/MM3 (ref 0.1–0.9)
MONOCYTES NFR BLD AUTO: 9.2 % (ref 5–12)
NEUTROPHILS NFR BLD AUTO: 4.84 10*3/MM3 (ref 1.7–7)
NEUTROPHILS NFR BLD AUTO: 62.9 % (ref 42.7–76)
NRBC BLD AUTO-RTO: 0 /100 WBC (ref 0–0.2)
PHOSPHATE SERPL-MCNC: 3.5 MG/DL (ref 2.5–4.5)
PLATELET # BLD AUTO: 120 10*3/MM3 (ref 140–450)
PMV BLD AUTO: 10.7 FL (ref 6–12)
POTASSIUM SERPL-SCNC: 4.7 MMOL/L (ref 3.5–5.2)
PROT SERPL-MCNC: 6.7 G/DL (ref 6–8.5)
RBC # BLD AUTO: 4.57 10*6/MM3 (ref 4.14–5.8)
SODIUM SERPL-SCNC: 142 MMOL/L (ref 136–145)
WBC NRBC COR # BLD AUTO: 7.71 10*3/MM3 (ref 3.4–10.8)

## 2024-05-22 PROCEDURE — 80053 COMPREHEN METABOLIC PANEL: CPT

## 2024-05-22 PROCEDURE — 96372 THER/PROPH/DIAG INJ SC/IM: CPT

## 2024-05-22 PROCEDURE — 96402 CHEMO HORMON ANTINEOPL SQ/IM: CPT

## 2024-05-22 PROCEDURE — 85025 COMPLETE CBC W/AUTO DIFF WBC: CPT

## 2024-05-22 PROCEDURE — 25010000002 LEUPROLIDE ACETATE PER 7.5 MG: Performed by: INTERNAL MEDICINE

## 2024-05-22 PROCEDURE — 84153 ASSAY OF PSA TOTAL: CPT

## 2024-05-22 PROCEDURE — 36415 COLL VENOUS BLD VENIPUNCTURE: CPT

## 2024-05-22 PROCEDURE — 83735 ASSAY OF MAGNESIUM: CPT

## 2024-05-22 PROCEDURE — 84100 ASSAY OF PHOSPHORUS: CPT

## 2024-05-22 PROCEDURE — 25010000002 DENOSUMAB 120 MG/1.7ML SOLUTION: Performed by: INTERNAL MEDICINE

## 2024-05-22 RX ADMIN — DENOSUMAB 120 MG: 120 INJECTION SUBCUTANEOUS at 15:26

## 2024-05-22 RX ADMIN — LEUPROLIDE ACETATE 7.5 MG: KIT at 15:23

## 2024-05-22 NOTE — PROGRESS NOTES
DATE OF VISIT: 5/22/2024    REASON FOR VISIT: Followup for: 1.  Diffuse large B-cell lymphoma 2.  Prostate cancer     PROBLEM LIST:  1. Prostate cancer, initially presented as [id number] status post radical  prostatectomy 2000.  A.  Patient is on Lupron plus Prolia  B.  Tried to add apalutamide second rising PSA patient declined.  C.  Whole-body PSMA PET scan done September 2023 revealed 2 separate areas of uptake in the skeleton at the L1 left transverse process and close to cartilage junction of the right third anterior rib.  D.  We will start Eligard with Xtandi September 20, 2023  2.  Right-sided pulmonary embolisms:  A.  Currently on Eliquis twice a day  3.  Type 2 diabetes  4. Hhypertension  5. Hypercholesterolemia.  6.  Right subsegmental PE:  A. Started on Eliquis twice a day March 2021  7.  Osteopenia  8.  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  Status post right nephrectomy December 7, 2021  B.  Started adjuvant chemotherapy R-CHOP January 14, 2022, status post 3 cycles completed February 23, 2022      HISTORY OF PRESENT ILLNESS: The patient is a very pleasant 88 y.o. male  with past medical history significant for diffuse large B-cell lymphoma right kidney diagnosed December 2021.  Patient status post 3 cycles of chemotherapy with R-CHOP.  Repeat PET scan showed no evidence of residual disease. The patient is here today for scheduled follow-up visit.    SUBJECTIVE: Geovanny is here today by himself all in all he is doing well.  He is anxious about the scan results.    Past History:  Medical History: has a past medical history of Aortic stenosis, Arthritis, Bilateral impacted cerumen (11/23/2016), Bronchitis, CHF (congestive heart failure), Chronic gout of right foot (06/13/2016), COVID-19 vaccine series completed (05/12/2021), Depression, Diabetes mellitus, Diverticulitis, Exogenous obesity, Gout, Heart murmur, History of vitamin D deficiency, Hypercholesteremia (08/11/2016),  "Hyperlipidemia, Hypertension, Kidney lesion, Malignant neoplasm of prostate, Morbid obesity (08/11/2016), Pneumonia (05/12/2021), Primary osteoarthritis involving multiple joints (06/13/2016), Pulmonary embolism, Sleep apnea (05/12/2021), Uncontrolled type 2 diabetes mellitus with hyperglycemia (06/13/2016), and Vertigo.   Surgical History: has a past surgical history that includes Colostomy (1999); Other surgical history; Exploratory laparotomy; Colonoscopy; Revision Colostomy; Prostate surgery; Prostatectomy (2000); Colon surgery; Tonsillectomy; Skin cancer excision; Lipoma Excision; Mohs surgery; Cardiac valve replacement (05/12/2021); nephroureterectomy (Right, 12/7/2021); and Cystoscopy (N/A, 12/7/2021).   Family History: family history includes Breast cancer in an other family member; Cancer in his mother and another family member; Diabetes in his mother and another family member; Heart disease in his father; Hypertension in an other family member; Lung cancer in his mother; Migraines in an other family member; Obesity in an other family member.   Social History: reports that he has never smoked. He has never been exposed to tobacco smoke. He has never used smokeless tobacco. He reports that he does not drink alcohol and does not use drugs.    (Not in a hospital admission)     Allergies: Ampicillin, Medrol [methylprednisolone], Myrbetriq [mirabegron], Penicillins, Bee venom, and Melatonin     Review of Systems   Constitutional:  Positive for fatigue.   Respiratory:  Positive for shortness of breath.    Musculoskeletal:  Positive for arthralgias.         Current Outpatient Medications:     allopurinol (ZYLOPRIM) 100 MG tablet, Take 1 tablet by mouth Daily., Disp: 90 tablet, Rfl: 3    amiodarone (PACERONE) 200 MG tablet, Take 1 tablet by mouth Daily., Disp: , Rfl:     Calcium Carb-Cholecalciferol 600-5 MG-MCG tablet, Take 1 tablet by mouth Daily., Disp: 30 tablet, Rfl: 3    Droplet Insulin Syringe 31G X 5/16\" " "0.5 ML misc, , Disp: , Rfl:     Dulaglutide (Trulicity) 1.5 MG/0.5ML solution pen-injector, Inject 1.5 mg under the skin into the appropriate area as directed 1 (One) Time Per Week. Takes on Wednesdays, Disp: 7 mL, Rfl: 2    empagliflozin (Jardiance) 25 MG tablet tablet, Take 1 tablet by mouth Daily., Disp: 90 tablet, Rfl: 3    Entresto 24-26 MG tablet, TAKE ONE TABLET BY MOUTH EVERY 12 HOURS, Disp: 180 tablet, Rfl: 3    enzalutamide (XTANDI) 40 MG tablet tablet, Take 3 tablets by mouth Daily., Disp: 90 tablet, Rfl: 11    fluticasone (FLONASE) 50 MCG/ACT nasal spray, 2 sprays into the nostril(s) as directed by provider Daily. 2 puffs each nostril (Patient taking differently: 2 sprays into the nostril(s) as directed by provider Daily As Needed for Rhinitis or Allergies.), Disp: 1 bottle, Rfl: 0    glucose blood (True Metrix Blood Glucose Test) test strip, Use to test blood sugar 3 times daily.  Dx E11.65, Disp: 300 each, Rfl: 3    ketoconazole (NIZORAL) 2 % shampoo, Apply 1 application  topically to the appropriate area as directed 2 (Two) Times a Week. Indications: Tinea Versicolor, Disp: , Rfl:     Lantus 100 UNIT/ML injection, Inject up to 50 units daily subcutaneously (Patient taking differently: Inject up to 20 units daily subcutaneously), Disp: 20 mL, Rfl: 5    metoprolol succinate XL (TOPROL-XL) 25 MG 24 hr tablet, Take 0.5 tablets by mouth Daily for 360 days., Disp: 45 tablet, Rfl: 3    multivitamin with minerals tablet tablet, Take 1 tablet by mouth Daily. AREDS 2  Indications: vitamin deficiency, Disp: , Rfl:     pravastatin (PRAVACHOL) 40 MG tablet, TAKE ONE TABLET BY MOUTH EVERY NIGHT AT BEDTIME, Disp: 90 tablet, Rfl: 3    triamcinolone (KENALOG) 0.1 % cream, Apply 1 Application topically to the appropriate area as directed 2 (Two) Times a Day., Disp: , Rfl:     PHYSICAL EXAMINATION:   /64   Pulse 64   Temp 98.3 °F (36.8 °C)   Resp 16   Ht 167.6 cm (65.98\")   Wt 90.4 kg (199 lb 3.2 oz)   SpO2 " 97%   BMI 32.17 kg/m²    Pain Score    24 1402   PainSc: 0-No pain              ECOG score: 2            ECOG Performance Status: 2 - Symptomatic, <50% confined to bed  General Appearance:  alert, cooperative, no apparent distress and appears stated age   Neurologic/Psychiatric: A&O x 3, gait steady, appropriate affect, strength 5/5 in all muscle groups   HEENT:  Normocephalic, without obvious abnormality, mucous membranes moist   Neck: Supple, symmetrical, trachea midline, no adenopathy;  No thyromegaly, masses, or tenderness   Lungs:   Clear to auscultation bilaterally; respirations regular, even, and unlabored bilaterally   Heart:  Regular rate and rhythm, no murmurs appreciated   Abdomen:   Soft, non-tender, non-distended, and no organomegaly   Lymph nodes: No cervical, supraclavicular, inguinal or axillary adenopathy noted   Extremities:  +2 bilateral lower extremities edema    Skin: No rashes, ulcers, or suspicious lesions noted     No visits with results within 2 Week(s) from this visit.   Latest known visit with results is:   Office Visit on 2024   Component Date Value Ref Range Status    Reference Lab Report 2024    Final                    Value:[facility]  [address] [place] [address]  Phone: [phone] or [phone]  Fax: [phone]  Jesus Gerard M.D., Medical Director    PATIENT NAME                           LABORATORY NO.  [id number]  [facility] BLANDON                       [id number]  [id number]                         AGE              SEX  SSN           CLIENT REF #  [facility]               88      [id number]      [id number]   [id number]    [facility]     REQUESTING M.D.     ATTENDING M.D.     COPY TO.  [address] [facility] [address]            [place] [id number]                 DATE COLLECTED      DATE RECEIVED      DATE REPORTED  2024          "05/01/2024    DIAGNOSIS:  SKIN, PUNCH, RIGHT BUTTOCK:  Skin demonstrating irregular epidermal hyperplasia and overlying hyperkeratosis  GMS stain negative for fungal organisms.      COMMENT:  A GMS stain, evaluated with the appropriate control, fails to  demonstrate                           fungal organisms.  Overall features would suggest lichen simplex  chronicus.  However, if this sample represents a small portion of a larger lesion,  these findings may not be entirely representative.  Clinical correlation is needed  with possible additional sampling recommended as clinically indicated.    CLINICAL HISTORY:  Skin lesion, right buttock    SPECIMENS RECEIVED:  SKIN, PUNCH , RIGHT BUTTOCK    MICROSCOPIC DESCRIPTION:  Tissue blocks are prepared and slides are examined microscopically on all  specimens. See diagnosis for details.    Professional interpretation rendered by Sophia Khalil M.D., LINDA.C.AFlakitaP. at  [facility], [address] [place] [address].    GROSS DESCRIPTION:  Labeled as \"right buttock\", consisting of 1 piece of tan grossly apparent skin  punch measuring 0.4 x 0.3 x 0.8 cm.  The surgical margin is inked blue, bisected  and submitted entirely in 1 cassette.  SOG    REVIEWED, DIAGNOSED AND ELECTRONICALLY  SIGNED BY:    Sophia Khalil M.D.,                           F.C.A.P.  CPT CODES:  38772, 04286          CT Chest With Contrast Diagnostic    Result Date: 5/20/2024  Narrative: PROCEDURE: CT CHEST W CONTRAST DIAGNOSTIC-  HISTORY: f/u scans; C83.39-Diffuse large b-cell lymphoma, extranodal and solid organ sites; F58-Rxujycwtf neoplasm of prostate  COMPARISON: June 2, 2022  PROCEDURE: The patient was injected with IV contrast.  Axial images were obtained from the lung apex to the mid abdomen by computed tomography. This study was performed with techniques to keep radiation doses as low as reasonably achievable, (ALARA). Individualized dose reduction techniques using automated exposure " control or adjustment of mA and/or kV according to the patient size were employed.  FINDINGS:  CHEST: There is no suspicious axillary adenopathy. Status post median sternotomy. There is no suspicious hilar or mediastinal adenopathy.  The heart is mildly enlarged, stable from prior. There is no pericardial or pleural effusion.  No suspicious infiltrate or nodule identified. Limited images of the upper abdomen demonstrate mild nodularity of the hepatic contour suggesting cirrhosis. Remote right anterior rib deformity again noted. No bony destructive lesion seen.      Impression: No acute process.  Findings suggesting cirrhosis of the partially visualized liver, see CT abdomen pelvis report.  CTDI: 8.08 mGy DLP:669.18 mGy.cm  This report was signed and finalized on 5/20/2024 8:41 PM by Di Ayon MD.      CT Abdomen Pelvis With Contrast    Result Date: 5/20/2024  Narrative: PROCEDURE: CT ABDOMEN PELVIS W CONTRAST-  HISTORY: f/u scans; C83.39-Diffuse large b-cell lymphoma, extranodal and solid organ sites; S12-Wdutvlwqa neoplasm of prostate  COMPARISON: September 8, 2021.  PROCEDURE: The patient was injected with IV contrast. Oral contrast was administered. Axial images were obtained from the lung bases to the pubic symphysis by computed tomography. This study was performed with techniques to keep radiation doses as low as reasonably achievable, (ALARA). Individualized dose reduction techniques using automated exposure control or adjustment of mA and/or kV according to the patient size were employed.  FINDINGS:  ABDOMEN: The lung bases are clear. The heart is mildly enlarged, similar to prior exam. The liver is homogenous with no focal abnormality. There is a minimally nodular contour which is similar to the prior exam. Gallbladder is present with no CT visible stones. The spleen mildly enlarged with no focal abnormality, similar to prior. There is fullness of the adrenal glands bilaterally, similar to prior. The  pancreas atrophic but no dilated duct identified. There is a small calcification in the tail of the pancreas and a new 8 mm cystic lesion in the tail of the pancreas, recommend MRI of the abdomen with and without contrast for further evaluation. Again noted is a small midline upper abdominal wall hernia containing fat. Abdominal wall collaterals again noted and these collaterals appear slightly enlarged compared to the prior study. Question of a smaller, fat-containing abdominal wall hernia inferior to the first 1. These extend intra-abdominal he the kidneys demonstrate interval right nephrectomy with several metallic surgical clips in the right nephrectomy bed. The aorta is proper caliber. There is no free fluid or adenopathy.  PELVIS: The GI tract demonstrates no obstruction.  The appendix is not identified and there is a metallic surgical clip adjacent to the base of the cecum. No secondary signs of appendicitis seen. The urinary bladder is unremarkable. There is no free fluid, adenopathy, or inflammatory process. Multiple metallic surgical clips noted in the prostatectomy bed. No bony blastic lesion seen.      Impression: Mildly cirrhotic appearance of liver with mild splenomegaly and intra and extra abdominal collaterals as described.  Interval right nephrectomy.  New 8 mm cystic lesion tail of the pancreas, recommend MRI of the abdomen with and without contrast.  CTDI: 8.08 mGy DLP:669.18 mGy.cm  This report was signed and finalized on 5/20/2024 8:37 PM by Di Ayon MD.       ASSESSMENT: The patient is a very pleasant 88 y.o. male  with diffuse large B-cell lymphoma      PLAN:    1.  Diffuse large B-cell lymphoma stage Ia:  A.  I did go over the scan results with the patient from May 20, 2024 while it had few abnormalities there was no evidence of relapsed lymphoma or metastatic prostate cancer.  B.  The patient had 8 mm cystic lesion in the pancreas which we will watch for now.  C.  I will repeat the  patient's scans in 6 months and that would be due mid November 2024.    2.  Prostate cancer:  A.  I will continue the patient on Lupron monthly.  B. I will continue the patient on Xtandi 120 mg daily.  We will consider increasing the dose to full dose of 160 mg daily if he is doing well.  C.  I did go over the blood results with the patient from April 24, 2024 and reassured his PSA is down to 0.15.    3.  Bone metastases:  A.  I will continue the patient on Xgeva 120 mg subcu every 4 weeks.  B.  I will switch him to every 3 months after the first year.  Will be due September 2024.  C.  I will continue the patient on calcium and vitamin D daily.    4.  Right-sided pulmonary embolism:  A. The patient has completed 1 year of treatment.  His repeated CT PE protocol in June 2022 did not reveal any evidence of recurrent PEs.    5.  Hypercholesteremia:  A.  The patient continue pravastatin daily.    6.  Type 2 diabetes:  A.  The patient will continue Mo and Trulicity.    FOLLOW UP: 1 month with Eligard treatment, repeated scans as well as blood work.    Shannan Ramon MD  5/22/2024

## 2024-05-23 LAB — PSA SERPL-MCNC: 0.15 NG/ML (ref 0–4)

## 2024-05-28 ENCOUNTER — SPECIALTY PHARMACY (OUTPATIENT)
Dept: ONCOLOGY | Facility: HOSPITAL | Age: 89
End: 2024-05-28
Payer: MEDICARE

## 2024-05-28 NOTE — PROGRESS NOTES
"Specialty Pharmacy Refill Coordination Note     Blane \"CHEO\" is a 88 y.o. male contacted today regarding refills of  enzalutamide 120mg PO QD specialty medication(s).    Reviewed and verified with patient: yes; patient currently has a little over three weeks or a month supply on hand; states he only has missed a couple days here and there.     Specialty medication(s) and dose(s) confirmed: yes    Refill Questions      Flowsheet Row Most Recent Value   Changes to allergies? No   Changes to medications? No   New conditions or infections since last clinic visit No   Unplanned office visit, urgent care, ED, or hospital admission in the last 4 weeks  No   How does patient/caregiver feel medication is working? Good   Financial problems or insurance changes  No   Since the previous refill, were any specialty medication doses or scheduled injections missed or delayed?  Yes   If yes, please provide the amount patient has missed several doses   Why were doses missed? patient forgot   Does this patient require a clinical escalation to a pharmacist? Yes                       Follow-up: 30 day(s)     Blas aClle, Pharmacy Technician  Specialty Pharmacy Technician        "

## 2024-05-31 NOTE — PROGRESS NOTES
Patient states he misses about 3-4 doses per month. This is mostly due to forgetting to take his medication. Discussed the importance of adherence with the patient. Discussed tools to assist with adherence such as personalized calendar, pill box, working taking the medication into his daily routine. Patient has a current PDC of 95%. Patient stated understanding. Provided patient with my contact information.    Kim Alaniz, AmariD, [facility]  Clinical Oncology Pharmacy Specialist  Phone: [phone]

## 2024-06-11 ENCOUNTER — SPECIALTY PHARMACY (OUTPATIENT)
Dept: ONCOLOGY | Facility: HOSPITAL | Age: 89
End: 2024-06-11
Payer: MEDICARE

## 2024-06-11 NOTE — PROGRESS NOTES
"Specialty Pharmacy Refill Coordination Note     Blane \"CHEO\" is a 88 y.o. male contacted today regarding refills of  enzalutamide 120mg PO QD specialty medication(s).    Reviewed and verified with patient: yes  Specialty medication(s) and dose(s) confirmed: yes    Refill Questions      Flowsheet Row Most Recent Value   Changes to allergies? No   Changes to medications? No   New conditions or infections since last clinic visit No   Unplanned office visit, urgent care, ED, or hospital admission in the last 4 weeks  No   How does patient/caregiver feel medication is working? Good   Financial problems or insurance changes  No   Since the previous refill, were any specialty medication doses or scheduled injections missed or delayed?  No   If yes, please provide the amount n/a   Why were doses missed? n/a   Does this patient require a clinical escalation to a pharmacist? No            Delivery Questions      Flowsheet Row Most Recent Value   Delivery method [employer]Ex   Delivery address verified with patient/caregiver? Yes   Delivery address Home   Number of medications in delivery 1   Medication(s) being filled and delivered Enzalutamide   Doses left of specialty medications 2 doses left   Copay verified? Yes   Copay amount 5.26$   Copay form of payment Credit/debit on file                   Follow-up: 30 day(s)     Blas Calle, Pharmacy Technician  Specialty Pharmacy Technician        "

## 2024-06-19 ENCOUNTER — OFFICE VISIT (OUTPATIENT)
Dept: ONCOLOGY | Facility: CLINIC | Age: 89
End: 2024-06-19
Payer: MEDICARE

## 2024-06-19 ENCOUNTER — INFUSION (OUTPATIENT)
Dept: ONCOLOGY | Facility: HOSPITAL | Age: 89
End: 2024-06-19
Payer: MEDICARE

## 2024-06-19 VITALS
RESPIRATION RATE: 16 BRPM | HEART RATE: 97 BPM | TEMPERATURE: 96.9 F | DIASTOLIC BLOOD PRESSURE: 57 MMHG | BODY MASS INDEX: 32.24 KG/M2 | SYSTOLIC BLOOD PRESSURE: 115 MMHG | HEIGHT: 66 IN | WEIGHT: 200.6 LBS | OXYGEN SATURATION: 98 %

## 2024-06-19 DIAGNOSIS — C61 PROSTATE CANCER: Primary | ICD-10-CM

## 2024-06-19 DIAGNOSIS — C83.39 DIFFUSE LARGE B-CELL LYMPHOMA OF SOLID ORGAN EXCLUDING SPLEEN: Primary | ICD-10-CM

## 2024-06-19 DIAGNOSIS — C61 PROSTATE CANCER: ICD-10-CM

## 2024-06-19 LAB
ALBUMIN SERPL-MCNC: 4 G/DL (ref 3.5–5.2)
ALBUMIN/GLOB SERPL: 1.6 G/DL
ALP SERPL-CCNC: 87 U/L (ref 39–117)
ALT SERPL W P-5'-P-CCNC: 23 U/L (ref 1–41)
ANION GAP SERPL CALCULATED.3IONS-SCNC: 5.7 MMOL/L (ref 5–15)
AST SERPL-CCNC: 26 U/L (ref 1–40)
BASOPHILS # BLD AUTO: 0.12 10*3/MM3 (ref 0–0.2)
BASOPHILS NFR BLD AUTO: 1.5 % (ref 0–1.5)
BILIRUB SERPL-MCNC: 0.9 MG/DL (ref 0–1.2)
BUN SERPL-MCNC: 24 MG/DL (ref 8–23)
BUN/CREAT SERPL: 12.5 (ref 7–25)
CALCIUM SPEC-SCNC: 8.3 MG/DL (ref 8.6–10.5)
CHLORIDE SERPL-SCNC: 107 MMOL/L (ref 98–107)
CO2 SERPL-SCNC: 24.3 MMOL/L (ref 22–29)
CREAT SERPL-MCNC: 1.92 MG/DL (ref 0.76–1.27)
DEPRECATED RDW RBC AUTO: 52.7 FL (ref 37–54)
EGFRCR SERPLBLD CKD-EPI 2021: 33.5 ML/MIN/1.73
EOSINOPHIL # BLD AUTO: 0.33 10*3/MM3 (ref 0–0.4)
EOSINOPHIL NFR BLD AUTO: 4 % (ref 0.3–6.2)
ERYTHROCYTE [DISTWIDTH] IN BLOOD BY AUTOMATED COUNT: 15.3 % (ref 12.3–15.4)
GLOBULIN UR ELPH-MCNC: 2.5 GM/DL
GLUCOSE SERPL-MCNC: 195 MG/DL (ref 65–99)
HCT VFR BLD AUTO: 41.3 % (ref 37.5–51)
HGB BLD-MCNC: 13.3 G/DL (ref 13–17.7)
IMM GRANULOCYTES # BLD AUTO: 0.14 10*3/MM3 (ref 0–0.05)
IMM GRANULOCYTES NFR BLD AUTO: 1.7 % (ref 0–0.5)
LYMPHOCYTES # BLD AUTO: 1.15 10*3/MM3 (ref 0.7–3.1)
LYMPHOCYTES NFR BLD AUTO: 14.1 % (ref 19.6–45.3)
MAGNESIUM SERPL-MCNC: 2.5 MG/DL (ref 1.6–2.4)
MCH RBC QN AUTO: 30.4 PG (ref 26.6–33)
MCHC RBC AUTO-ENTMCNC: 32.2 G/DL (ref 31.5–35.7)
MCV RBC AUTO: 94.5 FL (ref 79–97)
MONOCYTES # BLD AUTO: 0.61 10*3/MM3 (ref 0.1–0.9)
MONOCYTES NFR BLD AUTO: 7.5 % (ref 5–12)
NEUTROPHILS NFR BLD AUTO: 5.81 10*3/MM3 (ref 1.7–7)
NEUTROPHILS NFR BLD AUTO: 71.2 % (ref 42.7–76)
NRBC BLD AUTO-RTO: 0 /100 WBC (ref 0–0.2)
PHOSPHATE SERPL-MCNC: 3.2 MG/DL (ref 2.5–4.5)
PLATELET # BLD AUTO: 107 10*3/MM3 (ref 140–450)
PMV BLD AUTO: 10 FL (ref 6–12)
POTASSIUM SERPL-SCNC: 5.7 MMOL/L (ref 3.5–5.2)
PROT SERPL-MCNC: 6.5 G/DL (ref 6–8.5)
PSA SERPL-MCNC: 0.13 NG/ML (ref 0–4)
RBC # BLD AUTO: 4.37 10*6/MM3 (ref 4.14–5.8)
SODIUM SERPL-SCNC: 137 MMOL/L (ref 136–145)
WBC NRBC COR # BLD AUTO: 8.16 10*3/MM3 (ref 3.4–10.8)

## 2024-06-19 PROCEDURE — 96401 CHEMO ANTI-NEOPL SQ/IM: CPT

## 2024-06-19 PROCEDURE — 84153 ASSAY OF PSA TOTAL: CPT

## 2024-06-19 PROCEDURE — 83735 ASSAY OF MAGNESIUM: CPT

## 2024-06-19 PROCEDURE — 96372 THER/PROPH/DIAG INJ SC/IM: CPT

## 2024-06-19 PROCEDURE — 84100 ASSAY OF PHOSPHORUS: CPT

## 2024-06-19 PROCEDURE — 99214 OFFICE O/P EST MOD 30 MIN: CPT | Performed by: INTERNAL MEDICINE

## 2024-06-19 PROCEDURE — 1126F AMNT PAIN NOTED NONE PRSNT: CPT | Performed by: INTERNAL MEDICINE

## 2024-06-19 PROCEDURE — 80053 COMPREHEN METABOLIC PANEL: CPT

## 2024-06-19 PROCEDURE — 36415 COLL VENOUS BLD VENIPUNCTURE: CPT

## 2024-06-19 PROCEDURE — 85025 COMPLETE CBC W/AUTO DIFF WBC: CPT

## 2024-06-19 PROCEDURE — 25010000002 LEUPROLIDE 7.5 MG KIT: Performed by: INTERNAL MEDICINE

## 2024-06-19 PROCEDURE — 96402 CHEMO HORMON ANTINEOPL SQ/IM: CPT

## 2024-06-19 PROCEDURE — 25010000002 DENOSUMAB 120 MG/1.7ML SOLUTION: Performed by: INTERNAL MEDICINE

## 2024-06-19 RX ADMIN — DENOSUMAB 120 MG: 120 INJECTION SUBCUTANEOUS at 15:23

## 2024-06-19 RX ADMIN — LEUPROLIDE ACETATE 7.5 MG: 7.5 INJECTION, SUSPENSION, EXTENDED RELEASE SUBCUTANEOUS at 15:23

## 2024-06-19 NOTE — PROGRESS NOTES
DATE OF VISIT: 6/19/2024    REASON FOR VISIT: Followup for: 1.  Diffuse large B-cell lymphoma 2.  Prostate cancer     PROBLEM LIST:  1. Prostate cancer, initially presented as [id number] status post radical  prostatectomy 2000.  A.  Patient is on Lupron plus Prolia  B.  Tried to add apalutamide second rising PSA patient declined.  C.  Whole-body PSMA PET scan done September 2023 revealed 2 separate areas of uptake in the skeleton at the L1 left transverse process and close to cartilage junction of the right third anterior rib.  D.  We will start Eligard with Xtandi September 20, 2023  2.  Right-sided pulmonary embolisms:  A.  Currently on Eliquis twice a day  3.  Type 2 diabetes  4. Hhypertension  5. Hypercholesterolemia.  6.  Right subsegmental PE:  A. Started on Eliquis twice a day March 2021  7.  Osteopenia  8.  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  Status post right nephrectomy December 7, 2021  B.  Started adjuvant chemotherapy R-CHOP January 14, 2022, status post 3 cycles completed February 23, 2022      HISTORY OF PRESENT ILLNESS: The patient is a very pleasant 88 y.o. male  with past medical history significant for diffuse large B-cell lymphoma right kidney diagnosed December 2021.  Patient status post 3 cycles of chemotherapy with R-CHOP.  Repeat PET scan showed no evidence of residual disease. The patient is here today for scheduled follow-up visit.    SUBJECTIVE: Geovanny is here today by himself.  He is doing fairly well.  Denies any fever or chills.  His breathing is stable.    Past History:  Medical History: has a past medical history of Aortic stenosis, Arthritis, Bilateral impacted cerumen (11/23/2016), Bronchitis, CHF (congestive heart failure), Chronic gout of right foot (06/13/2016), COVID-19 vaccine series completed (05/12/2021), Depression, Diabetes mellitus, Diverticulitis, Exogenous obesity, Gout, Heart murmur, History of vitamin D deficiency, Hypercholesteremia  (08/11/2016), Hyperlipidemia, Hypertension, Kidney lesion, Malignant neoplasm of prostate, Morbid obesity (08/11/2016), Pneumonia (05/12/2021), Primary osteoarthritis involving multiple joints (06/13/2016), Pulmonary embolism, Sleep apnea (05/12/2021), Uncontrolled type 2 diabetes mellitus with hyperglycemia (06/13/2016), and Vertigo.   Surgical History: has a past surgical history that includes Colostomy (1999); Other surgical history; Exploratory laparotomy; Colonoscopy; Revision Colostomy; Prostate surgery; Prostatectomy (2000); Colon surgery; Tonsillectomy; Skin cancer excision; Lipoma Excision; Mohs surgery; Cardiac valve replacement (05/12/2021); nephroureterectomy (Right, 12/7/2021); and Cystoscopy (N/A, 12/7/2021).   Family History: family history includes Breast cancer in an other family member; Cancer in his mother and another family member; Diabetes in his mother and another family member; Heart disease in his father; Hypertension in an other family member; Lung cancer in his mother; Migraines in an other family member; Obesity in an other family member.   Social History: reports that he has never smoked. He has never been exposed to tobacco smoke. He has never used smokeless tobacco. He reports that he does not drink alcohol and does not use drugs.    (Not in a hospital admission)     Allergies: Ampicillin, Medrol [methylprednisolone], Myrbetriq [mirabegron], Penicillins, Bee venom, and Melatonin     Review of Systems   Constitutional:  Positive for fatigue.   Respiratory:  Positive for shortness of breath.    Musculoskeletal:  Positive for arthralgias.         Current Outpatient Medications:     allopurinol (ZYLOPRIM) 100 MG tablet, Take 1 tablet by mouth Daily., Disp: 90 tablet, Rfl: 3    amiodarone (PACERONE) 200 MG tablet, Take 1 tablet by mouth Daily., Disp: , Rfl:     Calcium Carb-Cholecalciferol 600-5 MG-MCG tablet, Take 1 tablet by mouth Daily., Disp: 30 tablet, Rfl: 3    Droplet Insulin Syringe  "31G X 5/16\" 0.5 ML misc, , Disp: , Rfl:     Dulaglutide (Trulicity) 1.5 MG/0.5ML solution pen-injector, Inject 1.5 mg under the skin into the appropriate area as directed 1 (One) Time Per Week. Takes on Wednesdays, Disp: 7 mL, Rfl: 2    empagliflozin (Jardiance) 25 MG tablet tablet, Take 1 tablet by mouth Daily., Disp: 90 tablet, Rfl: 3    Entresto 24-26 MG tablet, TAKE ONE TABLET BY MOUTH EVERY 12 HOURS, Disp: 180 tablet, Rfl: 3    enzalutamide (XTANDI) 40 MG tablet tablet, Take 3 tablets by mouth Daily., Disp: 90 tablet, Rfl: 11    fluticasone (FLONASE) 50 MCG/ACT nasal spray, 2 sprays into the nostril(s) as directed by provider Daily. 2 puffs each nostril (Patient taking differently: 2 sprays into the nostril(s) as directed by provider Daily As Needed for Rhinitis or Allergies.), Disp: 1 bottle, Rfl: 0    glucose blood (True Metrix Blood Glucose Test) test strip, Use to test blood sugar 3 times daily.  Dx E11.65, Disp: 300 each, Rfl: 3    ketoconazole (NIZORAL) 2 % shampoo, Apply 1 application  topically to the appropriate area as directed 2 (Two) Times a Week. Indications: Tinea Versicolor, Disp: , Rfl:     Lantus 100 UNIT/ML injection, Inject up to 50 units daily subcutaneously (Patient taking differently: Inject up to 20 units daily subcutaneously), Disp: 20 mL, Rfl: 5    metoprolol succinate XL (TOPROL-XL) 25 MG 24 hr tablet, Take 0.5 tablets by mouth Daily for 360 days., Disp: 45 tablet, Rfl: 3    multivitamin with minerals tablet tablet, Take 1 tablet by mouth Daily. AREDS 2  Indications: vitamin deficiency, Disp: , Rfl:     pravastatin (PRAVACHOL) 40 MG tablet, TAKE ONE TABLET BY MOUTH EVERY NIGHT AT BEDTIME, Disp: 90 tablet, Rfl: 3    triamcinolone (KENALOG) 0.1 % cream, Apply 1 Application topically to the appropriate area as directed 2 (Two) Times a Day., Disp: , Rfl:     PHYSICAL EXAMINATION:   There were no vitals taken for this visit.   There were no vitals filed for this visit.                     "      ECOG Performance Status: 2 - Symptomatic, <50% confined to bed  General Appearance:  alert, cooperative, no apparent distress and appears stated age   Neurologic/Psychiatric: A&O x 3, gait steady, appropriate affect, strength 5/5 in all muscle groups   HEENT:  Normocephalic, without obvious abnormality, mucous membranes moist   Neck: Supple, symmetrical, trachea midline, no adenopathy;  No thyromegaly, masses, or tenderness   Lungs:   Clear to auscultation bilaterally; respirations regular, even, and unlabored bilaterally   Heart:  Regular rate and rhythm, no murmurs appreciated   Abdomen:   Soft, non-tender, non-distended, and no organomegaly   Lymph nodes: No cervical, supraclavicular, inguinal or axillary adenopathy noted   Extremities:  +2 bilateral lower extremities edema    Skin: No rashes, ulcers, or suspicious lesions noted     No visits with results within 2 Week(s) from this visit.   Latest known visit with results is:   Infusion on 05/22/2024   Component Date Value Ref Range Status    PSA 05/22/2024 0.147  0.000 - 4.000 ng/mL Final    Glucose 05/22/2024 247 (H)  65 - 99 mg/dL Final    Glucose >180, Hemoglobin A1C recommended.    BUN 05/22/2024 25 (H)  8 - 23 mg/dL Final    Creatinine 05/22/2024 1.63 (H)  0.76 - 1.27 mg/dL Final    Sodium 05/22/2024 142  136 - 145 mmol/L Final    Potassium 05/22/2024 4.7  3.5 - 5.2 mmol/L Final    Chloride 05/22/2024 103  98 - 107 mmol/L Final    CO2 05/22/2024 27.6  22.0 - 29.0 mmol/L Final    Calcium 05/22/2024 8.9  8.6 - 10.5 mg/dL Final    Total Protein 05/22/2024 6.7  6.0 - 8.5 g/dL Final    Albumin 05/22/2024 3.9  3.5 - 5.2 g/dL Final    ALT (SGPT) 05/22/2024 17  1 - 41 U/L Final    AST (SGOT) 05/22/2024 25  1 - 40 U/L Final    Alkaline Phosphatase 05/22/2024 94  39 - 117 U/L Final    Total Bilirubin 05/22/2024 0.9  0.0 - 1.2 mg/dL Final    Globulin 05/22/2024 2.8  gm/dL Final    A/G Ratio 05/22/2024 1.4  g/dL Final    BUN/Creatinine Ratio 05/22/2024 15.3  7.0 -  25.0 Final    Anion Gap 05/22/2024 11.4  5.0 - 15.0 mmol/L Final    eGFR 05/22/2024 40.8 (L)  >60.0 mL/min/1.73 Final    Magnesium 05/22/2024 2.6 (H)  1.6 - 2.4 mg/dL Final    Phosphorus 05/22/2024 3.5  2.5 - 4.5 mg/dL Final    WBC 05/22/2024 7.71  3.40 - 10.80 10*3/mm3 Final    RBC 05/22/2024 4.57  4.14 - 5.80 10*6/mm3 Final    Hemoglobin 05/22/2024 13.7  13.0 - 17.7 g/dL Final    Hematocrit 05/22/2024 42.1  37.5 - 51.0 % Final    MCV 05/22/2024 92.1  79.0 - 97.0 fL Final    MCH 05/22/2024 30.0  26.6 - 33.0 pg Final    MCHC 05/22/2024 32.5  31.5 - 35.7 g/dL Final    RDW 05/22/2024 14.3  12.3 - 15.4 % Final    RDW-SD 05/22/2024 47.8  37.0 - 54.0 fl Final    MPV 05/22/2024 10.7  6.0 - 12.0 fL Final    Platelets 05/22/2024 120 (L)  140 - 450 10*3/mm3 Final    Neutrophil % 05/22/2024 62.9  42.7 - 76.0 % Final    Lymphocyte % 05/22/2024 18.0 (L)  19.6 - 45.3 % Final    Monocyte % 05/22/2024 9.2  5.0 - 12.0 % Final    Eosinophil % 05/22/2024 5.4  0.3 - 6.2 % Final    Basophil % 05/22/2024 1.8 (H)  0.0 - 1.5 % Final    Immature Grans % 05/22/2024 2.7 (H)  0.0 - 0.5 % Final    Neutrophils, Absolute 05/22/2024 4.84  1.70 - 7.00 10*3/mm3 Final    Lymphocytes, Absolute 05/22/2024 1.39  0.70 - 3.10 10*3/mm3 Final    Monocytes, Absolute 05/22/2024 0.71  0.10 - 0.90 10*3/mm3 Final    Eosinophils, Absolute 05/22/2024 0.42 (H)  0.00 - 0.40 10*3/mm3 Final    Basophils, Absolute 05/22/2024 0.14  0.00 - 0.20 10*3/mm3 Final    Immature Grans, Absolute 05/22/2024 0.21 (H)  0.00 - 0.05 10*3/mm3 Final    nRBC 05/22/2024 0.0  0.0 - 0.2 /100 WBC Final        No results found.    ASSESSMENT: The patient is a very pleasant 88 y.o. male  with diffuse large B-cell lymphoma      PLAN:    1.  Diffuse large B-cell lymphoma stage Ia:  A.  I did go over the scan results with the patient from May 20, 2024 while it had few abnormalities there was no evidence of relapsed lymphoma or metastatic prostate cancer.  B.  The patient had 8 mm cystic lesion  in the pancreas which we will watch for now.  C.  I will repeat the patient's scans in 6 months and that would be due mid November 2024.    2.  Prostate cancer:  A.  I will continue the patient on Lupron monthly.  B. I will continue the patient on Xtandi 120 mg daily.  We will consider increasing the dose to full dose of 160 mg daily if he is doing well.  C.  I did go over the blood results with the patient from May 22, 2024 and reassured him his PSA is down to 0.147    3.  Bone metastases:  A.  I will continue the patient on Xgeva 120 mg subcu every 4 weeks.  B.  I will switch him to every 3 months after the first year.  Will be due September 2024.  C.  I will continue the patient on calcium and vitamin D daily.    4.  Right-sided pulmonary embolism:  A. The patient has completed 1 year of treatment.  His repeated CT PE protocol in June 2022 did not reveal any evidence of recurrent PEs.    5.  Hypercholesteremia:  A.  The patient continue pravastatin daily.    6.  Type 2 diabetes:  A.  The patient will continue Mo and Trulicity.    FOLLOW UP: 1 month with Eligard treatment, repeated scans as well as blood work.    Shannan Ramon MD  6/19/2024

## 2024-06-20 ENCOUNTER — SPECIALTY PHARMACY (OUTPATIENT)
Facility: HOSPITAL | Age: 89
End: 2024-06-20
Payer: MEDICARE

## 2024-07-09 ENCOUNTER — SPECIALTY PHARMACY (OUTPATIENT)
Dept: ONCOLOGY | Facility: HOSPITAL | Age: 89
End: 2024-07-09
Payer: MEDICARE

## 2024-07-09 NOTE — PROGRESS NOTES
"Specialty Pharmacy Refill Coordination Note     Blane \"CHEO\" is a 88 y.o. male contacted today regarding refills of  enzalutamide 120mg PO QD specialty medication(s).    Reviewed and verified with patient: yes  Specialty medication(s) and dose(s) confirmed: yes    Refill Questions      Flowsheet Row Most Recent Value   Changes to allergies? No   Changes to medications? No   New conditions or infections since last clinic visit No   Unplanned office visit, urgent care, ED, or hospital admission in the last 4 weeks  No   How does patient/caregiver feel medication is working? Good   Financial problems or insurance changes  No   Since the previous refill, were any specialty medication doses or scheduled injections missed or delayed?  No   If yes, please provide the amount 1 dose   Why were doses missed? patient was busy   Does this patient require a clinical escalation to a pharmacist? No            Delivery Questions      Flowsheet Row Most Recent Value   Delivery method [employer]   Delivery address verified with patient/caregiver? Yes   Delivery address Home   Number of medications in delivery 1   Medication(s) being filled and delivered Enzalutamide   Doses left of specialty medications less than a week   Copay verified? Yes   Copay amount 0$   Copay form of payment No copayment ($0)   Ship Date 7/9   Delivery Date 7/10   Signature Required No                   Follow-up: 30 day(s)     Blas Calle, Pharmacy Technician  Specialty Pharmacy Technician        "

## 2024-07-17 ENCOUNTER — HOSPITAL ENCOUNTER (OUTPATIENT)
Dept: INFUSION THERAPY | Facility: HOSPITAL | Age: 89
Discharge: HOME | End: 2024-07-17
Admitting: NURSE PRACTITIONER
Payer: MEDICARE

## 2024-07-17 ENCOUNTER — OFFICE VISIT (OUTPATIENT)
Dept: ONCOLOGY | Facility: CLINIC | Age: 89
End: 2024-07-17
Payer: MEDICARE

## 2024-07-17 VITALS
HEIGHT: 66 IN | TEMPERATURE: 97.5 F | DIASTOLIC BLOOD PRESSURE: 67 MMHG | WEIGHT: 202.3 LBS | OXYGEN SATURATION: 96 % | HEART RATE: 101 BPM | SYSTOLIC BLOOD PRESSURE: 132 MMHG | BODY MASS INDEX: 32.51 KG/M2 | RESPIRATION RATE: 16 BRPM

## 2024-07-17 VITALS — TEMPERATURE: 97.7 F

## 2024-07-17 DIAGNOSIS — C61 PROSTATE CANCER: Primary | ICD-10-CM

## 2024-07-17 LAB
ALBUMIN SERPL-MCNC: 3.9 G/DL (ref 3.5–5.2)
ALBUMIN/GLOB SERPL: 1.6 G/DL
ALP SERPL-CCNC: 84 U/L (ref 39–117)
ALT SERPL W P-5'-P-CCNC: 13 U/L (ref 1–41)
ANION GAP SERPL CALCULATED.3IONS-SCNC: 10.4 MMOL/L (ref 5–15)
AST SERPL-CCNC: 19 U/L (ref 1–40)
BASOPHILS # BLD AUTO: 0.09 10*3/MM3 (ref 0–0.2)
BASOPHILS NFR BLD AUTO: 1.4 % (ref 0–1.5)
BILIRUB SERPL-MCNC: 0.7 MG/DL (ref 0–1.2)
BUN SERPL-MCNC: 30 MG/DL (ref 8–23)
BUN/CREAT SERPL: 18.6 (ref 7–25)
CALCIUM SPEC-SCNC: 8.2 MG/DL (ref 8.6–10.5)
CHLORIDE SERPL-SCNC: 109 MMOL/L (ref 98–107)
CO2 SERPL-SCNC: 23.6 MMOL/L (ref 22–29)
CREAT SERPL-MCNC: 1.61 MG/DL (ref 0.76–1.27)
DEPRECATED RDW RBC AUTO: 52.1 FL (ref 37–54)
EGFRCR SERPLBLD CKD-EPI 2021: 41.4 ML/MIN/1.73
EOSINOPHIL # BLD AUTO: 0.39 10*3/MM3 (ref 0–0.4)
EOSINOPHIL NFR BLD AUTO: 6 % (ref 0.3–6.2)
ERYTHROCYTE [DISTWIDTH] IN BLOOD BY AUTOMATED COUNT: 15.1 % (ref 12.3–15.4)
GLOBULIN UR ELPH-MCNC: 2.5 GM/DL
GLUCOSE SERPL-MCNC: 170 MG/DL (ref 65–99)
HCT VFR BLD AUTO: 40.5 % (ref 37.5–51)
HGB BLD-MCNC: 13.2 G/DL (ref 13–17.7)
IMM GRANULOCYTES # BLD AUTO: 0.11 10*3/MM3 (ref 0–0.05)
IMM GRANULOCYTES NFR BLD AUTO: 1.7 % (ref 0–0.5)
LYMPHOCYTES # BLD AUTO: 1.15 10*3/MM3 (ref 0.7–3.1)
LYMPHOCYTES NFR BLD AUTO: 17.8 % (ref 19.6–45.3)
MAGNESIUM SERPL-MCNC: 2.6 MG/DL (ref 1.6–2.4)
MCH RBC QN AUTO: 30.7 PG (ref 26.6–33)
MCHC RBC AUTO-ENTMCNC: 32.6 G/DL (ref 31.5–35.7)
MCV RBC AUTO: 94.2 FL (ref 79–97)
MONOCYTES # BLD AUTO: 0.6 10*3/MM3 (ref 0.1–0.9)
MONOCYTES NFR BLD AUTO: 9.3 % (ref 5–12)
NEUTROPHILS NFR BLD AUTO: 4.11 10*3/MM3 (ref 1.7–7)
NEUTROPHILS NFR BLD AUTO: 63.8 % (ref 42.7–76)
NRBC BLD AUTO-RTO: 0 /100 WBC (ref 0–0.2)
PHOSPHATE SERPL-MCNC: 2.8 MG/DL (ref 2.5–4.5)
PLATELET # BLD AUTO: 103 10*3/MM3 (ref 140–450)
PMV BLD AUTO: 10.4 FL (ref 6–12)
POTASSIUM SERPL-SCNC: 5.5 MMOL/L (ref 3.5–5.2)
PROT SERPL-MCNC: 6.4 G/DL (ref 6–8.5)
PSA SERPL-MCNC: 0.13 NG/ML (ref 0–4)
RBC # BLD AUTO: 4.3 10*6/MM3 (ref 4.14–5.8)
SODIUM SERPL-SCNC: 143 MMOL/L (ref 136–145)
WBC NRBC COR # BLD AUTO: 6.45 10*3/MM3 (ref 3.4–10.8)

## 2024-07-17 PROCEDURE — 25010000002 LEUPROLIDE 7.5 MG KIT: Performed by: INTERNAL MEDICINE

## 2024-07-17 PROCEDURE — 80053 COMPREHEN METABOLIC PANEL: CPT | Performed by: NURSE PRACTITIONER

## 2024-07-17 PROCEDURE — 99214 OFFICE O/P EST MOD 30 MIN: CPT | Performed by: NURSE PRACTITIONER

## 2024-07-17 PROCEDURE — 1126F AMNT PAIN NOTED NONE PRSNT: CPT | Performed by: NURSE PRACTITIONER

## 2024-07-17 PROCEDURE — 36415 COLL VENOUS BLD VENIPUNCTURE: CPT

## 2024-07-17 PROCEDURE — 96372 THER/PROPH/DIAG INJ SC/IM: CPT

## 2024-07-17 PROCEDURE — 25010000002 DENOSUMAB 120 MG/1.7ML SOLUTION: Performed by: NURSE PRACTITIONER

## 2024-07-17 PROCEDURE — 85025 COMPLETE CBC W/AUTO DIFF WBC: CPT | Performed by: NURSE PRACTITIONER

## 2024-07-17 PROCEDURE — 84100 ASSAY OF PHOSPHORUS: CPT | Performed by: NURSE PRACTITIONER

## 2024-07-17 PROCEDURE — 1160F RVW MEDS BY RX/DR IN RCRD: CPT | Performed by: NURSE PRACTITIONER

## 2024-07-17 PROCEDURE — 1159F MED LIST DOCD IN RCRD: CPT | Performed by: NURSE PRACTITIONER

## 2024-07-17 PROCEDURE — 83735 ASSAY OF MAGNESIUM: CPT | Performed by: NURSE PRACTITIONER

## 2024-07-17 PROCEDURE — 84153 ASSAY OF PSA TOTAL: CPT | Performed by: INTERNAL MEDICINE

## 2024-07-17 RX ORDER — MULTIVIT WITH MINERALS/LUTEIN
1000 TABLET ORAL DAILY
COMMUNITY

## 2024-07-17 RX ORDER — ASPIRIN 81 MG
1 TABLET, DELAYED RELEASE (ENTERIC COATED) ORAL DAILY
Qty: 30 TABLET | Refills: 3 | Status: SHIPPED | OUTPATIENT
Start: 2024-07-17 | End: 2024-07-17 | Stop reason: SDUPTHER

## 2024-07-17 RX ORDER — SODIUM CHLORIDE 0.9 % (FLUSH) 0.9 %
10 SYRINGE (ML) INJECTION AS NEEDED
OUTPATIENT
Start: 2024-07-17

## 2024-07-17 RX ORDER — ASPIRIN 81 MG
1 TABLET, DELAYED RELEASE (ENTERIC COATED) ORAL EVERY OTHER DAY
Qty: 15 TABLET | Refills: 3 | Status: SHIPPED | OUTPATIENT
Start: 2024-07-17

## 2024-07-17 RX ADMIN — DENOSUMAB 120 MG: 120 INJECTION SUBCUTANEOUS at 12:20

## 2024-07-17 RX ADMIN — LEUPROLIDE ACETATE 7.5 MG: 7.5 INJECTION, SUSPENSION, EXTENDED RELEASE SUBCUTANEOUS at 12:20

## 2024-07-17 NOTE — CODE DOCUMENTATION
1215)  HIPOLITO Shearer notified of patient's calcium level of 8.2.  All labs reviewed per HIPOLITO Shearer and order received to proceed with Xgeva injection today and encourage patient to  calcium prescription she sent in for him today.  Counseled patient regarding calcium therapy per Olimpia Real's prescription.  Patient states he will  prescription tomorrow.  He also states he is currently taking vitamin D.

## 2024-07-17 NOTE — CODE DOCUMENTATION
1130)  Venipuncture to right antecubital x1 attempt to RAC.  Lab specimens collected and to inpatient lab for processing.  Patient tolerated well.

## 2024-07-17 NOTE — PROGRESS NOTES
DATE OF VISIT: 7/17/2024    REASON FOR VISIT: Followup for: 1.  Diffuse large B-cell lymphoma 2.  Prostate cancer     PROBLEM LIST:  1. Prostate cancer, initially presented as [id number] status post radical  prostatectomy 2000.  A.  Patient is on Lupron plus Prolia  B.  Tried to add apalutamide second rising PSA patient declined.  C.  Whole-body PSMA PET scan done September 2023 revealed 2 separate areas of uptake in the skeleton at the L1 left transverse process and close to cartilage junction of the right third anterior rib.  D.  We will start Eligard with Xtandi September 20, 2023  2.  Right-sided pulmonary embolisms:  A.  Currently on Eliquis twice a day  3.  Type 2 diabetes  4. Hhypertension  5. Hypercholesterolemia.  6.  Right subsegmental PE:  A. Started on Eliquis twice a day March 2021  7.  Osteopenia  8.  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  Status post right nephrectomy December 7, 2021  B.  Started adjuvant chemotherapy R-CHOP January 14, 2022, status post 3 cycles completed February 23, 2022      HISTORY OF PRESENT ILLNESS: The patient is a very pleasant 88 y.o. male with past medical history significant for diffuse large B-cell lymphoma right kidney diagnosed December 2021.  Patient status post 3 cycles of chemotherapy with R-CHOP.  Repeat PET scan showed no evidence of residual disease. The patient is here today for scheduled follow-up visit.    SUBJECTIVE: Geovanny is here today with his assistant.  Overall doing fairly well.  Staying active as possible.  Denies any fever, chills, night sweats.  Breathing is stable.        Past History:  Medical History: has a past medical history of Aortic stenosis, Arthritis, Bilateral impacted cerumen (11/23/2016), Bronchitis, CHF (congestive heart failure), Chronic gout of right foot (06/13/2016), COVID-19 vaccine series completed (05/12/2021), Depression, Diabetes mellitus, Diverticulitis, Exogenous obesity, Gout, Heart murmur, History of  vitamin D deficiency, Hypercholesteremia (08/11/2016), Hyperlipidemia, Hypertension, Kidney lesion, Malignant neoplasm of prostate, Morbid obesity (08/11/2016), Pneumonia (05/12/2021), Primary osteoarthritis involving multiple joints (06/13/2016), Pulmonary embolism, Sleep apnea (05/12/2021), Uncontrolled type 2 diabetes mellitus with hyperglycemia (06/13/2016), and Vertigo.   Surgical History: has a past surgical history that includes Colostomy (1999); Other surgical history; Exploratory laparotomy; Colonoscopy; Revision Colostomy; Prostate surgery; Prostatectomy (2000); Colon surgery; Tonsillectomy; Skin cancer excision; Lipoma Excision; Mohs surgery; Cardiac valve replacement (05/12/2021); nephroureterectomy (Right, 12/7/2021); and Cystoscopy (N/A, 12/7/2021).   Family History: family history includes Breast cancer in an other family member; Cancer in his mother and another family member; Diabetes in his mother and another family member; Heart disease in his father; Hypertension in an other family member; Lung cancer in his mother; Migraines in an other family member; Obesity in an other family member.   Social History: reports that he has never smoked. He has never been exposed to tobacco smoke. He has never used smokeless tobacco. He reports that he does not drink alcohol and does not use drugs.    (Not in a hospital admission)     Allergies: Ampicillin, Medrol [methylprednisolone], Myrbetriq [mirabegron], Penicillins, Bee venom, and Melatonin     Review of Systems   Constitutional:  Positive for fatigue.   Respiratory:  Positive for shortness of breath.    Musculoskeletal:  Positive for arthralgias.         Current Outpatient Medications:     allopurinol (ZYLOPRIM) 100 MG tablet, Take 1 tablet by mouth Daily., Disp: 90 tablet, Rfl: 3    amiodarone (PACERONE) 200 MG tablet, Take 1 tablet by mouth Daily., Disp: , Rfl:     Apoaequorin (PREVAGEN PO), Take  by mouth., Disp: , Rfl:     ascorbic acid (VITAMIN C) 1000  "MG tablet, Take 1 tablet by mouth Daily., Disp: , Rfl:     Calcium Carb-Cholecalciferol 600-5 MG-MCG tablet, Take 1 tablet by mouth Daily., Disp: 30 tablet, Rfl: 3    CINNAMON PO, Take  by mouth., Disp: , Rfl:     Droplet Insulin Syringe 31G X 5/16\" 0.5 ML misc, , Disp: , Rfl:     Dulaglutide (Trulicity) 1.5 MG/0.5ML solution pen-injector, Inject 1.5 mg under the skin into the appropriate area as directed 1 (One) Time Per Week. Takes on Wednesdays, Disp: 7 mL, Rfl: 2    empagliflozin (Jardiance) 25 MG tablet tablet, Take 1 tablet by mouth Daily., Disp: 90 tablet, Rfl: 3    Entresto 24-26 MG tablet, TAKE ONE TABLET BY MOUTH EVERY 12 HOURS, Disp: 180 tablet, Rfl: 3    enzalutamide (XTANDI) 40 MG tablet tablet, Take 3 tablets by mouth Daily., Disp: 90 tablet, Rfl: 11    fluticasone (FLONASE) 50 MCG/ACT nasal spray, 2 sprays into the nostril(s) as directed by provider Daily. 2 puffs each nostril (Patient taking differently: 2 sprays into the nostril(s) as directed by provider Daily As Needed for Rhinitis or Allergies.), Disp: 1 bottle, Rfl: 0    glucose blood (True Metrix Blood Glucose Test) test strip, Use to test blood sugar 3 times daily.  Dx E11.65, Disp: 300 each, Rfl: 3    ketoconazole (NIZORAL) 2 % shampoo, Apply 1 application  topically to the appropriate area as directed 2 (Two) Times a Week. Indications: Tinea Versicolor, Disp: , Rfl:     Lantus 100 UNIT/ML injection, Inject up to 50 units daily subcutaneously (Patient taking differently: Inject up to 20 units daily subcutaneously), Disp: 20 mL, Rfl: 5    multivitamin with minerals tablet tablet, Take 1 tablet by mouth Daily. AREDS 2  Indications: vitamin deficiency, Disp: , Rfl:     pravastatin (PRAVACHOL) 40 MG tablet, TAKE ONE TABLET BY MOUTH EVERY NIGHT AT BEDTIME, Disp: 90 tablet, Rfl: 3    triamcinolone (KENALOG) 0.1 % cream, Apply 1 Application topically to the appropriate area as directed 2 (Two) Times a Day., Disp: , Rfl:     VITAMIN D, " "CHOLECALCIFEROL, PO, Take  by mouth., Disp: , Rfl:     metoprolol succinate XL (TOPROL-XL) 25 MG 24 hr tablet, Take 0.5 tablets by mouth Daily for 360 days., Disp: 45 tablet, Rfl: 3    PHYSICAL EXAMINATION:   /67   Pulse 101   Temp 97.5 °F (36.4 °C)   Resp 16   Ht 167.6 cm (65.98\")   Wt 91.8 kg (202 lb 4.8 oz)   SpO2 96%   BMI 32.67 kg/m²    Pain Score    07/17/24 1045   PainSc: 0-No pain                ECOG score: 3            ECOG Performance Status: 2 - Symptomatic, <50% confined to bed  General Appearance:  alert, cooperative, no apparent distress and appears stated age   Neurologic/Psychiatric: A&O x 3, gait steady, appropriate affect, strength 5/5 in all muscle groups   HEENT:  Normocephalic, without obvious abnormality, mucous membranes moist   Neck: Supple, symmetrical, trachea midline, no adenopathy;  No thyromegaly, masses, or tenderness   Lungs:   Clear to auscultation bilaterally; respirations regular, even, and unlabored bilaterally   Heart:  Regular rate and rhythm, no murmurs appreciated   Abdomen:   Soft, non-tender, and non-distended   Lymph nodes: No cervical, supraclavicular, inguinal or axillary adenopathy noted   Extremities:  +2 bilateral lower extremities edema    Skin: No rashes, ulcers, or suspicious lesions noted     No visits with results within 2 Week(s) from this visit.   Latest known visit with results is:   Infusion on 06/19/2024   Component Date Value Ref Range Status    PSA 06/19/2024 0.131  0.000 - 4.000 ng/mL Final    Glucose 06/19/2024 195 (H)  65 - 99 mg/dL Final    Glucose >180, Hemoglobin A1C recommended.    BUN 06/19/2024 24 (H)  8 - 23 mg/dL Final    Creatinine 06/19/2024 1.92 (H)  0.76 - 1.27 mg/dL Final    Sodium 06/19/2024 137  136 - 145 mmol/L Final    Potassium 06/19/2024 5.7 (H)  3.5 - 5.2 mmol/L Final    Chloride 06/19/2024 107  98 - 107 mmol/L Final    CO2 06/19/2024 24.3  22.0 - 29.0 mmol/L Final    Calcium 06/19/2024 8.3 (L)  8.6 - 10.5 mg/dL Final    " Total Protein 06/19/2024 6.5  6.0 - 8.5 g/dL Final    Albumin 06/19/2024 4.0  3.5 - 5.2 g/dL Final    ALT (SGPT) 06/19/2024 23  1 - 41 U/L Final    AST (SGOT) 06/19/2024 26  1 - 40 U/L Final    Alkaline Phosphatase 06/19/2024 87  39 - 117 U/L Final    Total Bilirubin 06/19/2024 0.9  0.0 - 1.2 mg/dL Final    Globulin 06/19/2024 2.5  gm/dL Final    A/G Ratio 06/19/2024 1.6  g/dL Final    BUN/Creatinine Ratio 06/19/2024 12.5  7.0 - 25.0 Final    Anion Gap 06/19/2024 5.7  5.0 - 15.0 mmol/L Final    eGFR 06/19/2024 33.5 (L)  >60.0 mL/min/1.73 Final    Magnesium 06/19/2024 2.5 (H)  1.6 - 2.4 mg/dL Final    Phosphorus 06/19/2024 3.2  2.5 - 4.5 mg/dL Final    WBC 06/19/2024 8.16  3.40 - 10.80 10*3/mm3 Final    RBC 06/19/2024 4.37  4.14 - 5.80 10*6/mm3 Final    Hemoglobin 06/19/2024 13.3  13.0 - 17.7 g/dL Final    Hematocrit 06/19/2024 41.3  37.5 - 51.0 % Final    MCV 06/19/2024 94.5  79.0 - 97.0 fL Final    MCH 06/19/2024 30.4  26.6 - 33.0 pg Final    MCHC 06/19/2024 32.2  31.5 - 35.7 g/dL Final    RDW 06/19/2024 15.3  12.3 - 15.4 % Final    RDW-SD 06/19/2024 52.7  37.0 - 54.0 fl Final    MPV 06/19/2024 10.0  6.0 - 12.0 fL Final    Platelets 06/19/2024 107 (L)  140 - 450 10*3/mm3 Final    Neutrophil % 06/19/2024 71.2  42.7 - 76.0 % Final    Lymphocyte % 06/19/2024 14.1 (L)  19.6 - 45.3 % Final    Monocyte % 06/19/2024 7.5  5.0 - 12.0 % Final    Eosinophil % 06/19/2024 4.0  0.3 - 6.2 % Final    Basophil % 06/19/2024 1.5  0.0 - 1.5 % Final    Immature Grans % 06/19/2024 1.7 (H)  0.0 - 0.5 % Final    Neutrophils, Absolute 06/19/2024 5.81  1.70 - 7.00 10*3/mm3 Final    Lymphocytes, Absolute 06/19/2024 1.15  0.70 - 3.10 10*3/mm3 Final    Monocytes, Absolute 06/19/2024 0.61  0.10 - 0.90 10*3/mm3 Final    Eosinophils, Absolute 06/19/2024 0.33  0.00 - 0.40 10*3/mm3 Final    Basophils, Absolute 06/19/2024 0.12  0.00 - 0.20 10*3/mm3 Final    Immature Grans, Absolute 06/19/2024 0.14 (H)  0.00 - 0.05 10*3/mm3 Final    nRBC 06/19/2024  0.0  0.0 - 0.2 /100 WBC Final        No results found.    ASSESSMENT: The patient is a very pleasant 88 y.o. male  with diffuse large B-cell lymphoma      PLAN:    1.  Diffuse large B-cell lymphoma stage Ia:  A.  Scan results from May 20, 2024 while it had few abnormalities there was no evidence of relapsed lymphoma or metastatic prostate cancer.  B.  The patient had 8 mm cystic lesion in the pancreas which we will watch for now.  C.  I will repeat the patient's scans in 6 months and that would be due mid November 2024.    2.  Prostate cancer:  A.  I will continue the patient on Lupron monthly.  B. I will continue the patient on Xtandi 120 mg daily.  We will consider increasing the dose to full dose of 160 mg daily if he is doing well.  C.  I did go over the blood results with the patient from June 19 , 2024 and reassured him his PSA is down to 0.131    3.  Bone metastases:  A.  I will continue the patient on Xgeva 120 mg subcu every 4 weeks.  B.  I will switch him to every 3 months after the first year.  Will be due September 2024.  C.  I will continue the patient on calcium and vitamin D daily.    4.  Right-sided pulmonary embolism:  A. The patient has completed 1 year of treatment.  His repeated CT PE protocol in June 2022 did not reveal any evidence of recurrent PEs.    5.  Hypercholesteremia:  A.  The patient continue pravastatin daily.    6.  Type 2 diabetes:  A.  The patient will continue Mo and Trulicity.    FOLLOW UP: 1 month with Eligard treatment and blood work.    Olimpia Real, APRN  7/17/2024

## 2024-07-19 ENCOUNTER — SPECIALTY PHARMACY (OUTPATIENT)
Dept: ONCOLOGY | Facility: HOSPITAL | Age: 89
End: 2024-07-19
Payer: MEDICARE

## 2024-07-25 ENCOUNTER — OFFICE VISIT (OUTPATIENT)
Dept: INTERNAL MEDICINE | Facility: CLINIC | Age: 89
End: 2024-07-25
Payer: MEDICARE

## 2024-07-25 VITALS
HEIGHT: 67 IN | HEART RATE: 102 BPM | TEMPERATURE: 97.7 F | OXYGEN SATURATION: 98 % | WEIGHT: 196 LBS | DIASTOLIC BLOOD PRESSURE: 75 MMHG | SYSTOLIC BLOOD PRESSURE: 115 MMHG | BODY MASS INDEX: 30.76 KG/M2 | RESPIRATION RATE: 14 BRPM

## 2024-07-25 DIAGNOSIS — M25.422 SWELLING OF LEFT ELBOW: ICD-10-CM

## 2024-07-25 DIAGNOSIS — N18.32 STAGE 3B CHRONIC KIDNEY DISEASE: ICD-10-CM

## 2024-07-25 DIAGNOSIS — I10 ESSENTIAL HYPERTENSION: Primary | ICD-10-CM

## 2024-07-25 DIAGNOSIS — E11.65 TYPE 2 DIABETES MELLITUS WITH HYPERGLYCEMIA, WITH LONG-TERM CURRENT USE OF INSULIN: ICD-10-CM

## 2024-07-25 DIAGNOSIS — Z79.4 TYPE 2 DIABETES MELLITUS WITH HYPERGLYCEMIA, WITH LONG-TERM CURRENT USE OF INSULIN: ICD-10-CM

## 2024-07-25 DIAGNOSIS — I35.0 AORTIC VALVE STENOSIS, ETIOLOGY OF CARDIAC VALVE DISEASE UNSPECIFIED: ICD-10-CM

## 2024-07-25 LAB
EXPIRATION DATE: ABNORMAL
HBA1C MFR BLD: 9.2 % (ref 4.5–5.7)
Lab: ABNORMAL

## 2024-07-25 PROCEDURE — 99214 OFFICE O/P EST MOD 30 MIN: CPT | Performed by: INTERNAL MEDICINE

## 2024-07-25 PROCEDURE — 83036 HEMOGLOBIN GLYCOSYLATED A1C: CPT | Performed by: INTERNAL MEDICINE

## 2024-07-25 PROCEDURE — 3046F HEMOGLOBIN A1C LEVEL >9.0%: CPT | Performed by: INTERNAL MEDICINE

## 2024-07-25 PROCEDURE — 1126F AMNT PAIN NOTED NONE PRSNT: CPT | Performed by: INTERNAL MEDICINE

## 2024-07-25 PROCEDURE — G2211 COMPLEX E/M VISIT ADD ON: HCPCS | Performed by: INTERNAL MEDICINE

## 2024-07-25 RX ORDER — DULAGLUTIDE 3 MG/.5ML
3 INJECTION, SOLUTION SUBCUTANEOUS WEEKLY
Qty: 6 ML | Refills: 3 | Status: SHIPPED | OUTPATIENT
Start: 2024-07-25

## 2024-07-25 NOTE — PROGRESS NOTES
"Subjective  Blane Dietz is a 88 y.o. male    HPI coming in f for evaluation of a left elbow swelling he has a history of poorly controlled diabetes and chronic kidney disease.  Elbow swelling noted for a few weeks no new trauma appears to be improving somewhat no fever or chills he has a history of gout.  He has not taken anything for the elbow swelling    The following portions of the patient's history were reviewed and updated as appropriate: allergies, current medications, past family history, past medical history, past social history, past surgical history, and problem list.     Review of Systems   Constitutional:  Positive for fatigue. Negative for activity change, appetite change and fever.   HENT:  Negative for congestion, ear discharge, ear pain and trouble swallowing.    Eyes:  Negative for photophobia and visual disturbance.   Respiratory:  Negative for cough and shortness of breath.    Cardiovascular:  Negative for chest pain and palpitations.   Gastrointestinal:  Negative for abdominal distention, abdominal pain, constipation, diarrhea, nausea and vomiting.   Endocrine: Negative.    Genitourinary:  Negative for dysuria, hematuria and urgency.   Musculoskeletal:  Positive for arthralgias. Negative for back pain, joint swelling and myalgias.   Skin:  Negative for color change and rash.   Allergic/Immunologic: Negative.    Neurological:  Negative for dizziness, weakness, light-headedness and headaches.   Hematological:  Negative for adenopathy. Does not bruise/bleed easily.   Psychiatric/Behavioral:  Negative for agitation, confusion and dysphoric mood. The patient is not nervous/anxious.        Visit Vitals  /75   Pulse 102   Temp 97.7 °F (36.5 °C)   Resp 14   Ht 168.9 cm (66.5\")   Wt 88.9 kg (196 lb)   SpO2 98%   BMI 31.16 kg/m²       Objective  Physical Exam  Constitutional:       General: He is not in acute distress.     Appearance: He is well-developed.   HENT:      Nose: Nose normal.   Eyes: "      General: No scleral icterus.     Conjunctiva/sclera: Conjunctivae normal.   Neck:      Thyroid: No thyromegaly.      Trachea: No tracheal deviation.   Cardiovascular:      Rate and Rhythm: Normal rate and regular rhythm.      Heart sounds: No murmur heard.     No friction rub.   Pulmonary:      Effort: No respiratory distress.      Breath sounds: No wheezing or rales.   Abdominal:      General: There is no distension.      Palpations: Abdomen is soft. There is no mass.      Tenderness: There is no abdominal tenderness. There is no guarding.   Musculoskeletal:         General: Deformity present. Normal range of motion.        Arms:       Comments: Swelling, non tender   Lymphadenopathy:      Cervical: No cervical adenopathy.   Skin:     General: Skin is warm and dry.      Findings: No erythema or rash.   Neurological:      Mental Status: He is alert and oriented to person, place, and time.      Cranial Nerves: No cranial nerve deficit.      Coordination: Coordination normal.      Deep Tendon Reflexes: Reflexes are normal and symmetric.   Psychiatric:         Behavior: Behavior normal.         Thought Content: Thought content normal.         Judgment: Judgment normal.       Diagnoses and all orders for this visit:    Essential hypertension stable with current meds and low-salt diet    Aortic valve stenosis, etiology of cardiac valve disease unspecified    Type 2 diabetes mellitus with hyperglycemia, with long-term current use of insulin continue with the dietary restrictions A1c today shows suboptimal control Trulicity dose has been increased to 3 mg weekly continue with Jardiance and insulin counseled about diet again    Stage 3b chronic kidney disease stable continue with the adequate fluid intake follow renal function    Swelling of left elbow suspect bursitis.  Since it is not painful and appears to be improving we will continue to observe for now avoid pressure on his elbow    Other orders  -     Dulaglutide  (Trulicity) 3 MG/0.5ML solution pen-injector; Inject 0.5 mL under the skin into the appropriate area as directed 1 (One) Time Per Week.

## 2024-08-01 ENCOUNTER — OFFICE VISIT (OUTPATIENT)
Dept: ENDOCRINOLOGY | Facility: CLINIC | Age: 89
End: 2024-08-01
Payer: MEDICARE

## 2024-08-01 VITALS
HEIGHT: 66 IN | DIASTOLIC BLOOD PRESSURE: 62 MMHG | BODY MASS INDEX: 31.66 KG/M2 | OXYGEN SATURATION: 95 % | WEIGHT: 197 LBS | HEART RATE: 103 BPM | SYSTOLIC BLOOD PRESSURE: 110 MMHG

## 2024-08-01 DIAGNOSIS — Z79.4 TYPE 2 DIABETES MELLITUS WITH HYPERGLYCEMIA, WITH LONG-TERM CURRENT USE OF INSULIN: Primary | ICD-10-CM

## 2024-08-01 DIAGNOSIS — E11.65 TYPE 2 DIABETES MELLITUS WITH HYPERGLYCEMIA, WITH LONG-TERM CURRENT USE OF INSULIN: Primary | ICD-10-CM

## 2024-08-01 LAB
EXPIRATION DATE: ABNORMAL
GLUCOSE BLDC GLUCOMTR-MCNC: 197 MG/DL (ref 70–130)
Lab: ABNORMAL

## 2024-08-01 PROCEDURE — 84443 ASSAY THYROID STIM HORMONE: CPT | Performed by: PHYSICIAN ASSISTANT

## 2024-08-01 PROCEDURE — 99213 OFFICE O/P EST LOW 20 MIN: CPT | Performed by: PHYSICIAN ASSISTANT

## 2024-08-01 PROCEDURE — 36415 COLL VENOUS BLD VENIPUNCTURE: CPT | Performed by: PHYSICIAN ASSISTANT

## 2024-08-01 PROCEDURE — 80053 COMPREHEN METABOLIC PANEL: CPT | Performed by: PHYSICIAN ASSISTANT

## 2024-08-01 PROCEDURE — 82947 ASSAY GLUCOSE BLOOD QUANT: CPT | Performed by: PHYSICIAN ASSISTANT

## 2024-08-01 PROCEDURE — 80061 LIPID PANEL: CPT | Performed by: PHYSICIAN ASSISTANT

## 2024-08-01 NOTE — PROGRESS NOTES
Office Note      Date: 2024  Patient Name: Blane Dietz  MRN: [id number]  : 1935    Chief Complaint   Patient presents with    Diabetes     Type II       History of Present Illness:   Blane Dietz is a 88 y.o. male who presents for Diabetes type 2.   Current RX: Trulicity 3 mg weekly, jardiance 25 mg daily, lantus 27 units    Bg checks are done: occasionally in the morning; 180-192 was a recent fasting level  Hypoglycemia :none    Trulicity was increased from 1.5 mg to 3 mg by his PCP last week. He has one dose of this and tolerating it well. He is doing better with administering it regularly.    He never received CGM that was ordered through medical supply. Wonders if can be shipped to his assisted living facility instead of PO box. Address is:   [address] [place] [address] [address] [place] [id number]    Last A1c:  Hemoglobin A1C   Date Value Ref Range Status   2024 9.2 (A) 4.5 - 5.7 % Final   10/25/2022 8.60 (H) 4.80 - 5.60 % Final       Changes in health since last visit: Had lupron shot last week, xtandi causea some fatigue.     DM Health Maintenance:  Ophtho: 2024; no diabetic retinopathy per patient  Monofilament / Foot exam:  24, follows with podiatrist  Lipids/Statin: taking a statin with last FLP showing LDL 24   KRYS: 2024  TSH: 24   Aspirin: not taking  ACE/ARB: valsartan (entresto)      Subjective     Diabetic Complications:  Eyes: No  Kidneys: Yes, describe: CKD stage 3  Feet: No  Heart: No; no CAD, has CHF         Review of Systems:   Review of Systems   Constitutional:  Negative for activity change, appetite change and fatigue.   Respiratory:  Negative for chest tightness and shortness of breath.    Gastrointestinal:  Negative for abdominal pain.   Musculoskeletal:  Positive for arthralgias and gait problem. Negative for myalgias.   Psychiatric/Behavioral:  The patient is not nervous/anxious.        The following portions of the patient's history were  "reviewed and updated as appropriate: allergies, current medications, past family history, past medical history, past social history, past surgical history, and problem list.    Objective     Visit Vitals  /62 (BP Location: Left arm, Patient Position: Sitting, Cuff Size: Adult)   Pulse 103   Ht 167.6 cm (66\")   Wt 89.4 kg (197 lb)   SpO2 95%   BMI 31.80 kg/m²           Physical Exam:  Physical Exam  Constitutional:       Appearance: He is well-developed.   HENT:      Head: Normocephalic and atraumatic.      Right Ear: External ear normal.      Left Ear: External ear normal.   Eyes:      Conjunctiva/sclera: Conjunctivae normal.   Cardiovascular:      Rate and Rhythm: Normal rate and regular rhythm.   Pulmonary:      Effort: Pulmonary effort is normal.      Breath sounds: Normal breath sounds.   Musculoskeletal:         General: Normal range of motion.      Cervical back: Normal range of motion.   Skin:     General: Skin is warm and dry.   Psychiatric:         Behavior: Behavior normal.          Assessment / Plan      Assessment & Plan:  Diagnoses and all orders for this visit:    1. Type 2 diabetes mellitus with hyperglycemia, with long-term current use of insulin (Primary)  Assessment & Plan:  Diabetes is improving with treatment.   Continue current treatment regimen.  Reminded to bring in blood sugar diary at next visit.  Recommended an ADA diet.  Recommended a Mediterranean style of eating    Recent increase in Trulicity to 3 mg. Agree with this adjustment.    Will again work towards getting patient CGM for closer monitoring of glucose and prevention of hypoglycemia with use of insulin.     Check fasting screening labs. Further recommendations based on results.    Diabetes will be reassessed in 3 months    Orders:  -     POC Glucose, Blood  -     Comprehensive Metabolic Panel; Future  -     Lipid Panel; Future  -     TSH Rfx On Abnormal To Free T4; Future  -     Comprehensive Metabolic Panel  -     Lipid " Panel  -     TSH Rfx On Abnormal To Free T4        Return in about 3 months (around 11/1/2024) for Follow up.    Portions of this note were completed with voice recognition program.  Electronically signed by Sophia Ferreira PA-C  [facility]  08/01/2024

## 2024-08-02 LAB
ALBUMIN SERPL-MCNC: 4.3 G/DL (ref 3.5–5.2)
ALBUMIN/GLOB SERPL: 1.6 G/DL
ALP SERPL-CCNC: 76 U/L (ref 39–117)
ALT SERPL W P-5'-P-CCNC: 14 U/L (ref 1–41)
ANION GAP SERPL CALCULATED.3IONS-SCNC: 7.3 MMOL/L (ref 5–15)
AST SERPL-CCNC: 25 U/L (ref 1–40)
BILIRUB SERPL-MCNC: 0.9 MG/DL (ref 0–1.2)
BUN SERPL-MCNC: 29 MG/DL (ref 8–23)
BUN/CREAT SERPL: 16.8 (ref 7–25)
CALCIUM SPEC-SCNC: 10 MG/DL (ref 8.6–10.5)
CHLORIDE SERPL-SCNC: 103 MMOL/L (ref 98–107)
CHOLEST SERPL-MCNC: 142 MG/DL (ref 0–200)
CO2 SERPL-SCNC: 28.7 MMOL/L (ref 22–29)
CREAT SERPL-MCNC: 1.73 MG/DL (ref 0.76–1.27)
EGFRCR SERPLBLD CKD-EPI 2021: 38 ML/MIN/1.73
GLOBULIN UR ELPH-MCNC: 2.7 GM/DL
GLUCOSE SERPL-MCNC: 137 MG/DL (ref 65–99)
HDLC SERPL-MCNC: 65 MG/DL (ref 40–60)
LDLC SERPL CALC-MCNC: 62 MG/DL (ref 0–100)
LDLC/HDLC SERPL: 0.94 {RATIO}
POTASSIUM SERPL-SCNC: 5.4 MMOL/L (ref 3.5–5.2)
PROT SERPL-MCNC: 7 G/DL (ref 6–8.5)
SODIUM SERPL-SCNC: 139 MMOL/L (ref 136–145)
TRIGL SERPL-MCNC: 80 MG/DL (ref 0–150)
TSH SERPL DL<=0.05 MIU/L-ACNC: 2.56 UIU/ML (ref 0.27–4.2)
VLDLC SERPL-MCNC: 15 MG/DL (ref 5–40)

## 2024-08-05 ENCOUNTER — SPECIALTY PHARMACY (OUTPATIENT)
Dept: ONCOLOGY | Facility: HOSPITAL | Age: 89
End: 2024-08-05
Payer: MEDICARE

## 2024-08-05 NOTE — PROGRESS NOTES
"Specialty Pharmacy Refill Coordination Note     Blane \"CHEO\" is a 88 y.o. male contacted today regarding refills of  enzalutamide 120mg PO QD specialty medication(s).    Reviewed and verified with patient: yes  Specialty medication(s) and dose(s) confirmed: yes    Refill Questions      Flowsheet Row Most Recent Value   Changes to allergies? No   Changes to medications? No   New conditions or infections since last clinic visit No   Unplanned office visit, urgent care, ED, or hospital admission in the last 4 weeks  No   How does patient/caregiver feel medication is working? Good   Financial problems or insurance changes  No   Since the previous refill, were any specialty medication doses or scheduled injections missed or delayed?  No   If yes, please provide the amount 1 or 2 maybe   Why were doses missed? patient forgot   Does this patient require a clinical escalation to a pharmacist? No            Delivery Questions      Flowsheet Row Most Recent Value   Delivery method [employer]   Delivery address verified with patient/caregiver? Yes   Delivery address Temporary   Number of medications in delivery 1   Medication(s) being filled and delivered Enzalutamide   Doses left of specialty medications about a weeks worth   Copay verified? Yes   Copay amount 0$   Copay form of payment No copayment ($0)   Ship Date 8/5   Delivery Date 8/6   Signature Required No                   Follow-up: 30 day(s)     Blas Calle, Pharmacy Technician  Specialty Pharmacy Technician        "

## 2024-08-05 NOTE — ASSESSMENT & PLAN NOTE
Diabetes is improving with treatment.   Continue current treatment regimen.  Reminded to bring in blood sugar diary at next visit.  Recommended an ADA diet.  Recommended a Mediterranean style of eating    Recent increase in Trulicity to 3 mg. Agree with this adjustment.    Will again work towards getting patient CGM for closer monitoring of glucose and prevention of hypoglycemia with use of insulin.     Check fasting screening labs. Further recommendations based on results.    Diabetes will be reassessed in 3 months

## 2024-08-14 ENCOUNTER — OFFICE VISIT (OUTPATIENT)
Dept: ONCOLOGY | Facility: CLINIC | Age: 89
End: 2024-08-14
Payer: MEDICARE

## 2024-08-14 ENCOUNTER — HOSPITAL ENCOUNTER (OUTPATIENT)
Dept: INFUSION THERAPY | Facility: HOSPITAL | Age: 89
Discharge: HOME | End: 2024-08-14
Payer: MEDICARE

## 2024-08-14 VITALS
BODY MASS INDEX: 32.45 KG/M2 | TEMPERATURE: 97.1 F | HEART RATE: 105 BPM | SYSTOLIC BLOOD PRESSURE: 117 MMHG | HEIGHT: 66 IN | WEIGHT: 201.9 LBS | OXYGEN SATURATION: 96 % | DIASTOLIC BLOOD PRESSURE: 62 MMHG | RESPIRATION RATE: 16 BRPM

## 2024-08-14 DIAGNOSIS — C61 PROSTATE CANCER: Primary | ICD-10-CM

## 2024-08-14 LAB
ALBUMIN SERPL-MCNC: 4 G/DL (ref 3.5–5.2)
ALBUMIN/GLOB SERPL: 1.6 G/DL
ALP SERPL-CCNC: 70 U/L (ref 39–117)
ALT SERPL W P-5'-P-CCNC: 16 U/L (ref 1–41)
ANION GAP SERPL CALCULATED.3IONS-SCNC: 7.6 MMOL/L (ref 5–15)
AST SERPL-CCNC: 22 U/L (ref 1–40)
BASOPHILS # BLD AUTO: 0.09 10*3/MM3 (ref 0–0.2)
BASOPHILS NFR BLD AUTO: 1.3 % (ref 0–1.5)
BILIRUB SERPL-MCNC: 0.9 MG/DL (ref 0–1.2)
BUN SERPL-MCNC: 24 MG/DL (ref 8–23)
BUN/CREAT SERPL: 16.2 (ref 7–25)
CALCIUM SPEC-SCNC: 9.1 MG/DL (ref 8.6–10.5)
CHLORIDE SERPL-SCNC: 103 MMOL/L (ref 98–107)
CO2 SERPL-SCNC: 26.4 MMOL/L (ref 22–29)
CREAT SERPL-MCNC: 1.48 MG/DL (ref 0.76–1.27)
DEPRECATED RDW RBC AUTO: 50.4 FL (ref 37–54)
EGFRCR SERPLBLD CKD-EPI 2021: 45.5 ML/MIN/1.73
EOSINOPHIL # BLD AUTO: 0.35 10*3/MM3 (ref 0–0.4)
EOSINOPHIL NFR BLD AUTO: 4.9 % (ref 0.3–6.2)
ERYTHROCYTE [DISTWIDTH] IN BLOOD BY AUTOMATED COUNT: 14.6 % (ref 12.3–15.4)
GLOBULIN UR ELPH-MCNC: 2.5 GM/DL
GLUCOSE SERPL-MCNC: 209 MG/DL (ref 65–99)
HCT VFR BLD AUTO: 41.3 % (ref 37.5–51)
HGB BLD-MCNC: 13.4 G/DL (ref 13–17.7)
IMM GRANULOCYTES # BLD AUTO: 0.12 10*3/MM3 (ref 0–0.05)
IMM GRANULOCYTES NFR BLD AUTO: 1.7 % (ref 0–0.5)
LYMPHOCYTES # BLD AUTO: 1.22 10*3/MM3 (ref 0.7–3.1)
LYMPHOCYTES NFR BLD AUTO: 17.1 % (ref 19.6–45.3)
MAGNESIUM SERPL-MCNC: 2.1 MG/DL (ref 1.6–2.4)
MCH RBC QN AUTO: 30.5 PG (ref 26.6–33)
MCHC RBC AUTO-ENTMCNC: 32.4 G/DL (ref 31.5–35.7)
MCV RBC AUTO: 93.9 FL (ref 79–97)
MONOCYTES # BLD AUTO: 0.58 10*3/MM3 (ref 0.1–0.9)
MONOCYTES NFR BLD AUTO: 8.1 % (ref 5–12)
NEUTROPHILS NFR BLD AUTO: 4.78 10*3/MM3 (ref 1.7–7)
NEUTROPHILS NFR BLD AUTO: 66.9 % (ref 42.7–76)
NRBC BLD AUTO-RTO: 0 /100 WBC (ref 0–0.2)
PHOSPHATE SERPL-MCNC: 4.1 MG/DL (ref 2.5–4.5)
PLATELET # BLD AUTO: 89 10*3/MM3 (ref 140–450)
PMV BLD AUTO: 10.3 FL (ref 6–12)
POTASSIUM SERPL-SCNC: 4.8 MMOL/L (ref 3.5–5.2)
PROT SERPL-MCNC: 6.5 G/DL (ref 6–8.5)
RBC # BLD AUTO: 4.4 10*6/MM3 (ref 4.14–5.8)
SODIUM SERPL-SCNC: 137 MMOL/L (ref 136–145)
WBC NRBC COR # BLD AUTO: 7.14 10*3/MM3 (ref 3.4–10.8)

## 2024-08-14 PROCEDURE — 83735 ASSAY OF MAGNESIUM: CPT | Performed by: INTERNAL MEDICINE

## 2024-08-14 PROCEDURE — 96402 CHEMO HORMON ANTINEOPL SQ/IM: CPT

## 2024-08-14 PROCEDURE — 80053 COMPREHEN METABOLIC PANEL: CPT | Performed by: INTERNAL MEDICINE

## 2024-08-14 PROCEDURE — 84100 ASSAY OF PHOSPHORUS: CPT | Performed by: INTERNAL MEDICINE

## 2024-08-14 PROCEDURE — 1126F AMNT PAIN NOTED NONE PRSNT: CPT | Performed by: INTERNAL MEDICINE

## 2024-08-14 PROCEDURE — 25010000002 DENOSUMAB 120 MG/1.7ML SOLUTION: Performed by: NURSE PRACTITIONER

## 2024-08-14 PROCEDURE — 25010000002 LEUPROLIDE 7.5 MG KIT: Performed by: INTERNAL MEDICINE

## 2024-08-14 PROCEDURE — 85025 COMPLETE CBC W/AUTO DIFF WBC: CPT | Performed by: INTERNAL MEDICINE

## 2024-08-14 PROCEDURE — 99214 OFFICE O/P EST MOD 30 MIN: CPT | Performed by: INTERNAL MEDICINE

## 2024-08-14 PROCEDURE — 84153 ASSAY OF PSA TOTAL: CPT | Performed by: INTERNAL MEDICINE

## 2024-08-14 PROCEDURE — 96372 THER/PROPH/DIAG INJ SC/IM: CPT

## 2024-08-14 RX ADMIN — LEUPROLIDE ACETATE 7.5 MG: 7.5 INJECTION, SUSPENSION, EXTENDED RELEASE SUBCUTANEOUS at 15:02

## 2024-08-14 RX ADMIN — DENOSUMAB 120 MG: 120 INJECTION SUBCUTANEOUS at 15:19

## 2024-08-14 NOTE — PROGRESS NOTES
DATE OF VISIT: 8/14/2024    REASON FOR VISIT: Followup for: 1.  Diffuse large B-cell lymphoma 2.  Prostate cancer     PROBLEM LIST:  1. Prostate cancer, initially presented as [id number] status post radical  prostatectomy 2000.  A.  Patient is on Lupron plus Prolia  B.  Tried to add apalutamide second rising PSA patient declined.  C.  Whole-body PSMA PET scan done September 2023 revealed 2 separate areas of uptake in the skeleton at the L1 left transverse process and close to cartilage junction of the right third anterior rib.  D.  We will start Eligard with Xtandi September 20, 2023  2.  Right-sided pulmonary embolisms:  A.  Currently on Eliquis twice a day  3.  Type 2 diabetes  4. Hhypertension  5. Hypercholesterolemia.  6.  Right subsegmental PE:  A. Started on Eliquis twice a day March 2021  7.  Osteopenia  8.  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  Status post right nephrectomy December 7, 2021  B.  Started adjuvant chemotherapy R-CHOP January 14, 2022, status post 3 cycles completed February 23, 2022      HISTORY OF PRESENT ILLNESS: The patient is a very pleasant 89 y.o. male with past medical history significant for diffuse large B-cell lymphoma right kidney diagnosed December 2021.  Patient status post 3 cycles of chemotherapy with R-CHOP.  Repeat PET scan showed no evidence of residual disease. The patient is here today for scheduled follow-up visit.    SUBJECTIVE: Geovanny is here today by himself.  All in all he is doing well.  He denies any fever chills or night sweats.  He has been compliant with his treatment.    Past History:  Medical History: has a past medical history of Aortic stenosis, Arthritis, Bilateral impacted cerumen (11/23/2016), Bronchitis, CHF (congestive heart failure), Chronic gout of right foot (06/13/2016), COVID-19 vaccine series completed (05/12/2021), Depression, Diabetes mellitus, Diverticulitis, Exogenous obesity, Gout, Heart murmur, History of vitamin D  deficiency, Hypercholesteremia (08/11/2016), Hyperlipidemia, Hypertension, Kidney lesion, Malignant neoplasm of prostate, Morbid obesity (08/11/2016), Pneumonia (05/12/2021), Primary osteoarthritis involving multiple joints (06/13/2016), Pulmonary embolism, Sleep apnea (05/12/2021), Uncontrolled type 2 diabetes mellitus with hyperglycemia (06/13/2016), and Vertigo.   Surgical History: has a past surgical history that includes Colostomy (1999); Other surgical history; Exploratory laparotomy; Colonoscopy; Revision Colostomy; Prostate surgery; Prostatectomy (2000); Colon surgery; Tonsillectomy; Skin cancer excision; Lipoma Excision; Mohs surgery; Cardiac valve replacement (05/12/2021); nephroureterectomy (Right, 12/7/2021); and Cystoscopy (N/A, 12/7/2021).   Family History: family history includes Breast cancer in an other family member; Cancer in his mother and another family member; Diabetes in his mother and another family member; Heart disease in his father; Hypertension in an other family member; Lung cancer in his mother; Migraines in an other family member; Obesity in an other family member.   Social History: reports that he has never smoked. He has never been exposed to tobacco smoke. He has never used smokeless tobacco. He reports that he does not drink alcohol and does not use drugs.    (Not in a hospital admission)     Allergies: Ampicillin, Medrol [methylprednisolone], Myrbetriq [mirabegron], Penicillins, Bee venom, and Melatonin     Review of Systems   Constitutional:  Positive for fatigue.   Respiratory:  Positive for shortness of breath.    Musculoskeletal:  Positive for arthralgias.         Current Outpatient Medications:     allopurinol (ZYLOPRIM) 100 MG tablet, Take 1 tablet by mouth Daily., Disp: 90 tablet, Rfl: 3    amiodarone (PACERONE) 200 MG tablet, Take 1 tablet by mouth Daily., Disp: , Rfl:     Apoaequorin (PREVAGEN PO), Take  by mouth., Disp: , Rfl:     ascorbic acid (VITAMIN C) 1000 MG  "tablet, Take 1 tablet by mouth Daily., Disp: , Rfl:     Calcium Carb-Cholecalciferol 600-5 MG-MCG tablet, Take 1 tablet by mouth Every Other Day. Indications: Low Amount of Calcium in the Blood, Disp: 15 tablet, Rfl: 3    CINNAMON PO, Take  by mouth., Disp: , Rfl:     Droplet Insulin Syringe 31G X 5/16\" 0.5 ML misc, , Disp: , Rfl:     Dulaglutide (Trulicity) 3 MG/0.5ML solution pen-injector, Inject 0.5 mL under the skin into the appropriate area as directed 1 (One) Time Per Week., Disp: 6 mL, Rfl: 3    empagliflozin (Jardiance) 25 MG tablet tablet, Take 1 tablet by mouth Daily., Disp: 90 tablet, Rfl: 3    Entresto 24-26 MG tablet, TAKE ONE TABLET BY MOUTH EVERY 12 HOURS, Disp: 180 tablet, Rfl: 3    enzalutamide (XTANDI) 40 MG tablet tablet, Take 3 tablets by mouth Daily., Disp: 90 tablet, Rfl: 11    fluticasone (FLONASE) 50 MCG/ACT nasal spray, 2 sprays into the nostril(s) as directed by provider Daily. 2 puffs each nostril (Patient taking differently: 2 sprays into the nostril(s) as directed by provider Daily As Needed for Rhinitis or Allergies.), Disp: 1 bottle, Rfl: 0    glucose blood (True Metrix Blood Glucose Test) test strip, Use to test blood sugar 3 times daily.  Dx E11.65, Disp: 300 each, Rfl: 3    ketoconazole (NIZORAL) 2 % shampoo, Apply 1 application  topically to the appropriate area as directed 2 (Two) Times a Week. Indications: Tinea Versicolor, Disp: , Rfl:     Lantus 100 UNIT/ML injection, Inject up to 50 units daily subcutaneously (Patient taking differently: Inject up to 20 units daily subcutaneously), Disp: 20 mL, Rfl: 5    metoprolol succinate XL (TOPROL-XL) 25 MG 24 hr tablet, Take 0.5 tablets by mouth Daily for 360 days., Disp: 45 tablet, Rfl: 3    multivitamin with minerals tablet tablet, Take 1 tablet by mouth Daily. AREDS 2  Indications: vitamin deficiency, Disp: , Rfl:     pravastatin (PRAVACHOL) 40 MG tablet, TAKE ONE TABLET BY MOUTH EVERY NIGHT AT BEDTIME, Disp: 90 tablet, Rfl: 3    " triamcinolone (KENALOG) 0.1 % cream, Apply 1 Application topically to the appropriate area as directed 2 (Two) Times a Day., Disp: , Rfl:     VITAMIN D, CHOLECALCIFEROL, PO, Take  by mouth., Disp: , Rfl:     PHYSICAL EXAMINATION:   There were no vitals taken for this visit.   There were no vitals filed for this visit.                            ECOG Performance Status: 2 - Symptomatic, <50% confined to bed  General Appearance:  alert, cooperative, no apparent distress and appears stated age   Neurologic/Psychiatric: A&O x 3, gait steady, appropriate affect, strength 5/5 in all muscle groups   HEENT:  Normocephalic, without obvious abnormality, mucous membranes moist   Neck: Supple, symmetrical, trachea midline, no adenopathy;  No thyromegaly, masses, or tenderness   Lungs:   Clear to auscultation bilaterally; respirations regular, even, and unlabored bilaterally   Heart:  Regular rate and rhythm, no murmurs appreciated   Abdomen:   Soft, non-tender, and non-distended   Lymph nodes: No cervical, supraclavicular, inguinal or axillary adenopathy noted   Extremities:  +2 bilateral lower extremities edema    Skin: No rashes, ulcers, or suspicious lesions noted     Office Visit on 08/01/2024   Component Date Value Ref Range Status    Glucose 08/01/2024 197 (A)  70 - 130 mg/dL Final    Lot Number 08/01/2024 [id number]   Final    Expiration Date 08/01/2024 01/11/25   Final    Glucose 08/01/2024 137 (H)  65 - 99 mg/dL Final    BUN 08/01/2024 29 (H)  8 - 23 mg/dL Final    Creatinine 08/01/2024 1.73 (H)  0.76 - 1.27 mg/dL Final    Sodium 08/01/2024 139  136 - 145 mmol/L Final    Potassium 08/01/2024 5.4 (H)  3.5 - 5.2 mmol/L Final    Chloride 08/01/2024 103  98 - 107 mmol/L Final    CO2 08/01/2024 28.7  22.0 - 29.0 mmol/L Final    Calcium 08/01/2024 10.0  8.6 - 10.5 mg/dL Final    Total Protein 08/01/2024 7.0  6.0 - 8.5 g/dL Final    Albumin 08/01/2024 4.3  3.5 - 5.2 g/dL Final    ALT (SGPT) 08/01/2024 14  1 - 41 U/L Final     AST (SGOT) 08/01/2024 25  1 - 40 U/L Final    Alkaline Phosphatase 08/01/2024 76  39 - 117 U/L Final    Total Bilirubin 08/01/2024 0.9  0.0 - 1.2 mg/dL Final    Globulin 08/01/2024 2.7  gm/dL Final    A/G Ratio 08/01/2024 1.6  g/dL Final    BUN/Creatinine Ratio 08/01/2024 16.8  7.0 - 25.0 Final    Anion Gap 08/01/2024 7.3  5.0 - 15.0 mmol/L Final    eGFR 08/01/2024 38.0 (L)  >60.0 mL/min/1.73 Final    Total Cholesterol 08/01/2024 142  0 - 200 mg/dL Final    Triglycerides 08/01/2024 80  0 - 150 mg/dL Final    HDL Cholesterol 08/01/2024 65 (H)  40 - 60 mg/dL Final    LDL Cholesterol  08/01/2024 62  0 - 100 mg/dL Final    VLDL Cholesterol 08/01/2024 15  5 - 40 mg/dL Final    LDL/HDL Ratio 08/01/2024 0.94   Final    TSH 08/01/2024 2.560  0.270 - 4.200 uIU/mL Final        No results found.    ASSESSMENT: The patient is a very pleasant 89 y.o. male  with diffuse large B-cell lymphoma      PLAN:    1.  Diffuse large B-cell lymphoma stage Ia:  A.  Scan results from May 20, 2024 while it had few abnormalities there was no evidence of relapsed lymphoma or metastatic prostate cancer.  B.  The patient had 8 mm cystic lesion in the pancreas which we will watch for now.  C.  I will repeat the patient's scans in 6 months and that would be due mid November 2024.    2.  Prostate cancer:  A.  I will continue the patient on Lupron monthly.  B. I will continue the patient on Xtandi 120 mg daily.  We will consider increasing the dose to full dose of 160 mg daily if he is doing well.  C.  I did go over the blood work results with the patient from August 1, 2024.  His CMP is essentially stable with creatinine 1.73.  PSA from July 17, 2024 stable at 0.13.    3.  Bone metastases:  A.  I will continue the patient on Xgeva 120 mg subcu every 4 weeks.  B.  I will switch him to every 3 months after the first year.  Will be due September 2024.  C.  I will continue the patient on calcium and vitamin D daily.    4.  Right-sided pulmonary  embolism:  A. The patient has completed 1 year of treatment.  His repeated CT PE protocol in June 2022 did not reveal any evidence of recurrent PEs.    5.  Hypercholesteremia:  A.  The patient continue pravastatin daily.    6.  Type 2 diabetes:  A.  The patient will continue [place] and Trulicity.    FOLLOW UP: 1 month with Eligard treatment and blood work.    Shannan Ramon MD  8/14/2024

## 2024-08-15 ENCOUNTER — SPECIALTY PHARMACY (OUTPATIENT)
Facility: HOSPITAL | Age: 89
End: 2024-08-15
Payer: MEDICARE

## 2024-08-15 LAB — PSA SERPL-MCNC: 0.21 NG/ML (ref 0–4)

## 2024-08-26 ENCOUNTER — SPECIALTY PHARMACY (OUTPATIENT)
Facility: HOSPITAL | Age: 89
End: 2024-08-26
Payer: MEDICARE

## 2024-08-26 NOTE — PROGRESS NOTES
Specialty Pharmacy Patient Management Program  Oncology 6-Month Clinical Assessment       Blane Dietz is a 89 y.o. male with prostate cancer seen today to assess adherence and side effects.    Reason for Outreach: Routine medication check-in .    Regimen: Enzalutamide 120mg PO daily    Specialty Pharmacy Goal   Goals Addressed Today        Specialty Pharmacy General Goal      Clinical goal/therapeutic target: stable or decreasing PSA and disease control, per the recent oncology clinic notes and labs. {  PSA   Date Value Ref Range Status   07/17/2024 0.130 0.000 - 4.000 ng/mL Final   06/19/2024 0.131 0.000 - 4.000 ng/mL Final   05/22/2024 0.147 0.000 - 4.000 ng/mL Final   04/24/2024 0.151 0.000 - 4.000 ng/mL Final   02/21/2024 0.180 0.000 - 4.000 ng/mL Final   01/10/2024 0.265 0.000 - 4.000 ng/mL Final   12/13/2023 0.367 0.000 - 4.000 ng/mL Final   11/01/2023 1.290 0.000 - 4.000 ng/mL Final   09/27/2023 51.400 (H) 0.000 - 4.000 ng/mL Final   03/30/2022 3.580 0.000 - 4.000 ng/mL Final     PSA          6/19/2024    14:58 7/17/2024    11:35 8/14/2024    14:18   PSA   PSA 0.131  0.130  0.212                  Problem List   Problem list reviewed by Kim Alaniz, PharmD on 8/26/2024 at  2:37 PM  Patient Active Problem List   Diagnosis Code    Essential hypertension I10    Type 2 diabetes mellitus with hyperglycemia, with long-term current use of insulin E11.65, Z79.4    Prostate cancer C61    Aortic stenosis I35.0    Sleep apnea G47.30    S/P AVR Z95.2    HLD (hyperlipidemia) E78.5    Renal mass N28.89    s/p right nepheroureterectomy and adrenalectomy  E89.6    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    PICC (peripherally inserted central catheter) flush Z45.2    History of pulmonary embolism Z86.711    History of malignant neoplasm of prostate Z85.46    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    Stage 3b chronic kidney disease N18.32    Pneumonia due to COVID-19 virus U07.1, J12.82    Abrasion T14.8XXA        Medication Assessment for Specialty Medication(s):  Medication Assessment  Follow Up Clinical Assessment  Linked Medication(s) Assessed: Enzalutamide  Therapeutic appropriateness: Appropriate  Medication tolerability: Tolerating with no to minimal ADRs  Medication plan: Continue therapy with normal follow-up  Quality of Life Improvement Scale: 10-Significantly better  Administration: Patient is taking every day at the same time  and as prescribed .   Patient can self administer medications: yes  Medication Follow-Up Plan: Next clinical assessment and Refill coordination  Lab Review: The labs listed below have been reviewed. No dose adjustments are needed for the oral specialty medication(s) based on the labs.    Lab Results   Component Value Date    GLUCOSE 209 (H) 08/14/2024    CALCIUM 9.1 08/14/2024     08/14/2024    K 4.8 08/14/2024    CO2 26.4 08/14/2024     08/14/2024    BUN 24 (H) 08/14/2024    CREATININE 1.48 (H) 08/14/2024    EGFRIFAFRI 95 10/14/2020    EGFRIFNONA 55 (L) 02/07/2022    BCR 16.2 08/14/2024    ANIONGAP 7.6 08/14/2024     Lab Results   Component Value Date    WBC 7.14 08/14/2024    RBC 4.40 08/14/2024    HGB 13.4 08/14/2024    HCT 41.3 08/14/2024    MCV 93.9 08/14/2024    MCH 30.5 08/14/2024    MCHC 32.4 08/14/2024    RDW 14.6 08/14/2024    RDWSD 50.4 08/14/2024    MPV 10.3 08/14/2024    PLT 89 (L) 08/14/2024    NEUTRORELPCT 66.9 08/14/2024    LYMPHORELPCT 17.1 (L) 08/14/2024    MONORELPCT 8.1 08/14/2024    EOSRELPCT 4.9 08/14/2024    BASORELPCT 1.3 08/14/2024    AUTOIGPER 1.7 (H) 08/14/2024    NEUTROABS 4.78 08/14/2024    LYMPHSABS 1.22 08/14/2024    MONOSABS 0.58 08/14/2024    EOSABS 0.35 08/14/2024    BASOSABS 0.09 08/14/2024    AUTOIGNUM 0.12 (H) 08/14/2024    NRBC 0.0 08/14/2024     Drug-drug interactions  Completed medication reconciliation today to assess for drug interactions. Patient denies starting or stopping any medications.    Assessed medication list for  "interactions,  enzalutamide may decrease the concentration of amiodarone  Advised patient to call the clinic if any new medications are started so we can assess for drug-drug interactions.  Drug-food interactions discussed: eating grapefruit and drinking grapefruit juice, eating [place] oranges (commonly found in orange marmalade), and eating starfruit  Vaccines are coordinated by the patient's oncologist and primary care provider.    Medications   Medicines reviewed by Kim Alaniz PharmD on 8/26/2024 at  2:37 PM  Prior to Admission medications    Medication Sig Start Date End Date Taking? Authorizing Provider   allopurinol (ZYLOPRIM) 100 MG tablet Take 1 tablet by mouth Daily. 6/30/23   David Em MD   amiodarone (PACERONE) 200 MG tablet Take 1 tablet by mouth Daily. 12/5/22   Ha Toussaint MD   Apoaequorin (PREVAGEN PO) Take  by mouth.    ProviderDave MD   ascorbic acid (VITAMIN C) 1000 MG tablet Take 1 tablet by mouth Daily.    Dave Devi MD   Calcium Carb-Cholecalciferol 600-5 MG-MCG tablet Take 1 tablet by mouth Every Other Day. Indications: Low Amount of Calcium in the Blood 7/17/24   Olimpia Real APRN   CINNAMON PO Take  by mouth.    ProviderDave MD   Droplet Insulin Syringe 31G X 5/16\" 0.5 ML misc  4/24/24   Dave Devi MD   Dulaglutide (Trulicity) 3 MG/0.5ML solution pen-injector Inject 0.5 mL under the skin into the appropriate area as directed 1 (One) Time Per Week. 7/25/24   David Em MD   empagliflozin (Jardiance) 25 MG tablet tablet Take 1 tablet by mouth Daily. 9/29/23   David Em MD   Entresto 24-26 MG tablet TAKE ONE TABLET BY MOUTH EVERY 12 HOURS 8/21/23   David Em MD   enzalutamide (XTANDI) 40 MG tablet tablet Take 3 tablets by mouth Daily. 9/18/23   Shannan Ramno MD   fluticasone (FLONASE) 50 MCG/ACT nasal spray 2 sprays into the nostril(s) as directed by provider Daily. 2 puffs each nostril  Patient taking " differently: 2 sprays into the nostril(s) as directed by provider Daily As Needed for Rhinitis or Allergies. 12/20/19   Rosanna Nicolas APRN   glucose blood (True Metrix Blood Glucose Test) test strip Use to test blood sugar 3 times daily.  Dx E11.65 4/30/24   Sophia Ferreira PA   ketoconazole (NIZORAL) 2 % shampoo Apply 1 application  topically to the appropriate area as directed 2 (Two) Times a Week. Indications: Tinea Versicolor 5/26/23   Dave Devi MD   Lantus 100 UNIT/ML injection Inject up to 50 units daily subcutaneously  Patient taking differently: Inject up to 20 units daily subcutaneously 7/21/23   Xavier Boston MD   metoprolol succinate XL (TOPROL-XL) 25 MG 24 hr tablet Take 0.5 tablets by mouth Daily for 360 days. 6/30/23 6/24/24  David Em MD   multivitamin with minerals tablet tablet Take 1 tablet by mouth Daily. AREDS 2  Indications: vitamin deficiency    Dave Devi MD   pravastatin (PRAVACHOL) 40 MG tablet TAKE ONE TABLET BY MOUTH EVERY NIGHT AT BEDTIME 8/21/23   David Em MD   triamcinolone (KENALOG) 0.1 % cream Apply 1 Application topically to the appropriate area as directed 2 (Two) Times a Day.    Dave Deiv MD   VITAMIN D, CHOLECALCIFEROL, PO Take  by mouth.    Dave Devi MD       Allergies  Known allergies and reactions were discussed with the patient. The patient's chart has been reviewed for allergy information and updated as necessary.   Allergies   Allergen Reactions    Ampicillin Anaphylaxis    Medrol [Methylprednisolone] Unknown - High Severity     Made his blood sugar get really high, can't take this    Myrbetriq [Mirabegron] Unknown - High Severity     High BP    Penicillins Anaphylaxis and Shortness Of Breath     Beta lactam allergy details  Antibiotic reaction: (!) shortness of breath  Age at reaction: adult  Dose to reaction time: (!) minutes  Reason for antibiotic: other  Epinephrine required for reaction?:  (!) yes           Bee Venom Swelling    Melatonin Hallucinations         Allergies reviewed by Kim Alaniz PharmD on 8/26/2024 at  2:37 PM  Allergies reviewed by iKm Alaniz PharmD on 8/26/2024 at  2:37 PM    Hospitalizations and Urgent Care Visits Since Last Assessment:  Unplanned hospitalizations or inpatient admissions: 0  ED Visits: 0  Urgent Office Visits: 0    Adherence Assessment:  Adherence Questions  Linked Medication(s) Assessed: Enzalutamide  On average, how many doses/injections does the patient miss per month?: 0  What are the identified reasons for non-adherence or missed doses? : no problems identified  What is the estimated medication adherence level?: %  Based on the patient/caregiver response and refill history, does this patient require an MTP to track adherence improvements?: no    Quality of Life Assessment:  Quality of Life Assessment  Quality of Life Improvement Scale: 10-Significantly better  -- Quality of Life: 10/10    Financial Assessment:  Medication availability/affordability: Patient has had no issues obtaining medication from pharmacy.    Attestation:  I attest the patient was actively involved in and has agreed to the above plan of care. If the prescribed therapy is at any point deemed not appropriate based on the current or future assessments, a consultation will be initiated with the patient's specialty care provider to determine the best course of action. The revised plan of therapy will be documented along with any required assessments and/or additional patient education provided.       All questions addressed and patient had no additional concerns. Patient has pharmacy contact information.    Kim Alaniz PharmD, [facility]  Clinical Oncology Pharmacy Specialist  Phone: [phone]      8/26/2024  14:38 EDT

## 2024-08-29 ENCOUNTER — SPECIALTY PHARMACY (OUTPATIENT)
Dept: ONCOLOGY | Facility: HOSPITAL | Age: 89
End: 2024-08-29
Payer: MEDICARE

## 2024-08-29 NOTE — PROGRESS NOTES
"Specialty Pharmacy Refill Coordination Note     Blane \"CHEO\" is a 89 y.o. male contacted today regarding refills of  enzalutamide 120mg PO QD specialty medication(s).    Reviewed and verified with patient: yes  Specialty medication(s) and dose(s) confirmed: yes    Refill Questions      Flowsheet Row Most Recent Value   Changes to allergies? No   Changes to medications? No   New conditions or infections since last clinic visit No   Unplanned office visit, urgent care, ED, or hospital admission in the last 4 weeks  No   How does patient/caregiver feel medication is working? Good   Financial problems or insurance changes  No   Since the previous refill, were any specialty medication doses or scheduled injections missed or delayed?  Yes   If yes, please provide the amount 1 or 2 doses missed   Why were doses missed? patient forgot   Does this patient require a clinical escalation to a pharmacist? No            Delivery Questions      Flowsheet Row Most Recent Value   Delivery method [employer]   Delivery address verified with patient/caregiver? Yes   Delivery address Home   Number of medications in delivery 1   Medication(s) being filled and delivered Enzalutamide   Doses left of specialty medications about 2 weeks left   Copay verified? Yes   Copay amount 0$   Copay form of payment No copayment ($0)   Ship Date 8/29   Delivery Date 8/30   Signature Required No                   Follow-up: 30 day(s)     Blas Calle, Pharmacy Technician  Specialty Pharmacy Technician        "

## 2024-09-04 ENCOUNTER — PATIENT OUTREACH (OUTPATIENT)
Dept: CASE MANAGEMENT | Facility: OTHER | Age: 89
End: 2024-09-04
Payer: MEDICARE

## 2024-09-18 ENCOUNTER — HOSPITAL ENCOUNTER (OUTPATIENT)
Facility: HOSPITAL | Age: 89
Discharge: HOME | End: 2024-09-18
Admitting: INTERNAL MEDICINE
Payer: MEDICARE

## 2024-09-18 ENCOUNTER — OFFICE VISIT (OUTPATIENT)
Dept: ONCOLOGY | Facility: CLINIC | Age: 89
End: 2024-09-18
Payer: MEDICARE

## 2024-09-18 VITALS
OXYGEN SATURATION: 94 % | DIASTOLIC BLOOD PRESSURE: 64 MMHG | WEIGHT: 198.3 LBS | RESPIRATION RATE: 16 BRPM | HEIGHT: 66 IN | SYSTOLIC BLOOD PRESSURE: 134 MMHG | TEMPERATURE: 97.1 F | HEART RATE: 99 BPM | BODY MASS INDEX: 31.87 KG/M2

## 2024-09-18 DIAGNOSIS — C61 PROSTATE CANCER: Primary | ICD-10-CM

## 2024-09-18 DIAGNOSIS — C61 PROSTATE CANCER: ICD-10-CM

## 2024-09-18 DIAGNOSIS — C83.39 DIFFUSE LARGE B-CELL LYMPHOMA OF SOLID ORGAN EXCLUDING SPLEEN: Primary | ICD-10-CM

## 2024-09-18 LAB
ALBUMIN SERPL-MCNC: 4.2 G/DL (ref 3.5–5.2)
ALBUMIN/GLOB SERPL: 1.7 G/DL
ALP SERPL-CCNC: 95 U/L (ref 39–117)
ALT SERPL W P-5'-P-CCNC: 17 U/L (ref 1–41)
ANION GAP SERPL CALCULATED.3IONS-SCNC: 11.6 MMOL/L (ref 5–15)
AST SERPL-CCNC: 24 U/L (ref 1–40)
BASOPHILS # BLD AUTO: 0.09 10*3/MM3 (ref 0–0.2)
BASOPHILS NFR BLD AUTO: 1.1 % (ref 0–1.5)
BILIRUB SERPL-MCNC: 0.9 MG/DL (ref 0–1.2)
BUN SERPL-MCNC: 33 MG/DL (ref 8–23)
BUN/CREAT SERPL: 17.8 (ref 7–25)
CALCIUM SPEC-SCNC: 9.3 MG/DL (ref 8.6–10.5)
CHLORIDE SERPL-SCNC: 104 MMOL/L (ref 98–107)
CO2 SERPL-SCNC: 26.4 MMOL/L (ref 22–29)
CREAT SERPL-MCNC: 1.85 MG/DL (ref 0.76–1.27)
DEPRECATED RDW RBC AUTO: 49.7 FL (ref 37–54)
EGFRCR SERPLBLD CKD-EPI 2021: 34.8 ML/MIN/1.73
EOSINOPHIL # BLD AUTO: 0.21 10*3/MM3 (ref 0–0.4)
EOSINOPHIL NFR BLD AUTO: 2.5 % (ref 0.3–6.2)
ERYTHROCYTE [DISTWIDTH] IN BLOOD BY AUTOMATED COUNT: 14.5 % (ref 12.3–15.4)
GLOBULIN UR ELPH-MCNC: 2.5 GM/DL
GLUCOSE SERPL-MCNC: 268 MG/DL (ref 65–99)
HCT VFR BLD AUTO: 44.3 % (ref 37.5–51)
HGB BLD-MCNC: 14.3 G/DL (ref 13–17.7)
IMM GRANULOCYTES # BLD AUTO: 0.13 10*3/MM3 (ref 0–0.05)
IMM GRANULOCYTES NFR BLD AUTO: 1.5 % (ref 0–0.5)
LYMPHOCYTES # BLD AUTO: 1.08 10*3/MM3 (ref 0.7–3.1)
LYMPHOCYTES NFR BLD AUTO: 12.6 % (ref 19.6–45.3)
MAGNESIUM SERPL-MCNC: 2.3 MG/DL (ref 1.6–2.4)
MCH RBC QN AUTO: 30.2 PG (ref 26.6–33)
MCHC RBC AUTO-ENTMCNC: 32.3 G/DL (ref 31.5–35.7)
MCV RBC AUTO: 93.7 FL (ref 79–97)
MONOCYTES # BLD AUTO: 0.6 10*3/MM3 (ref 0.1–0.9)
MONOCYTES NFR BLD AUTO: 7 % (ref 5–12)
NEUTROPHILS NFR BLD AUTO: 6.46 10*3/MM3 (ref 1.7–7)
NEUTROPHILS NFR BLD AUTO: 75.3 % (ref 42.7–76)
NRBC BLD AUTO-RTO: 0 /100 WBC (ref 0–0.2)
PHOSPHATE SERPL-MCNC: 4 MG/DL (ref 2.5–4.5)
PLATELET # BLD AUTO: 115 10*3/MM3 (ref 140–450)
PMV BLD AUTO: 10.7 FL (ref 6–12)
POTASSIUM SERPL-SCNC: 5 MMOL/L (ref 3.5–5.2)
PROT SERPL-MCNC: 6.7 G/DL (ref 6–8.5)
RBC # BLD AUTO: 4.73 10*6/MM3 (ref 4.14–5.8)
SODIUM SERPL-SCNC: 142 MMOL/L (ref 136–145)
WBC NRBC COR # BLD AUTO: 8.57 10*3/MM3 (ref 3.4–10.8)

## 2024-09-18 PROCEDURE — 84153 ASSAY OF PSA TOTAL: CPT | Performed by: INTERNAL MEDICINE

## 2024-09-18 PROCEDURE — 1126F AMNT PAIN NOTED NONE PRSNT: CPT | Performed by: INTERNAL MEDICINE

## 2024-09-18 PROCEDURE — 99214 OFFICE O/P EST MOD 30 MIN: CPT | Performed by: INTERNAL MEDICINE

## 2024-09-18 PROCEDURE — 84100 ASSAY OF PHOSPHORUS: CPT | Performed by: INTERNAL MEDICINE

## 2024-09-18 PROCEDURE — 80053 COMPREHEN METABOLIC PANEL: CPT | Performed by: INTERNAL MEDICINE

## 2024-09-18 PROCEDURE — 96372 THER/PROPH/DIAG INJ SC/IM: CPT

## 2024-09-18 PROCEDURE — 25010000002 DENOSUMAB 120 MG/1.7ML SOLUTION: Performed by: INTERNAL MEDICINE

## 2024-09-18 PROCEDURE — 25010000002 LEUPROLIDE 7.5 MG KIT: Performed by: INTERNAL MEDICINE

## 2024-09-18 PROCEDURE — 85025 COMPLETE CBC W/AUTO DIFF WBC: CPT | Performed by: INTERNAL MEDICINE

## 2024-09-18 PROCEDURE — 96402 CHEMO HORMON ANTINEOPL SQ/IM: CPT

## 2024-09-18 PROCEDURE — 83735 ASSAY OF MAGNESIUM: CPT | Performed by: INTERNAL MEDICINE

## 2024-09-18 RX ORDER — CICLOPIROX 1 G/100ML
SHAMPOO TOPICAL
COMMUNITY
Start: 2024-08-21

## 2024-09-18 RX ORDER — FLUOCINONIDE TOPICAL SOLUTION USP, 0.05% 0.5 MG/ML
SOLUTION TOPICAL
COMMUNITY
Start: 2024-08-19

## 2024-09-18 RX ADMIN — DENOSUMAB 120 MG: 120 INJECTION SUBCUTANEOUS at 15:09

## 2024-09-18 RX ADMIN — LEUPROLIDE ACETATE 7.5 MG: 7.5 INJECTION, SUSPENSION, EXTENDED RELEASE SUBCUTANEOUS at 15:09

## 2024-09-19 ENCOUNTER — SPECIALTY PHARMACY (OUTPATIENT)
Facility: HOSPITAL | Age: 89
End: 2024-09-19
Payer: MEDICARE

## 2024-09-19 LAB — PSA SERPL-MCNC: 0.52 NG/ML (ref 0–4)

## 2024-09-25 ENCOUNTER — SPECIALTY PHARMACY (OUTPATIENT)
Dept: ONCOLOGY | Facility: HOSPITAL | Age: 89
End: 2024-09-25
Payer: MEDICARE

## 2024-09-25 DIAGNOSIS — C61 PROSTATE CANCER: ICD-10-CM

## 2024-10-04 ENCOUNTER — OFFICE VISIT (OUTPATIENT)
Dept: INTERNAL MEDICINE | Facility: CLINIC | Age: 89
End: 2024-10-04
Payer: MEDICARE

## 2024-10-04 VITALS
DIASTOLIC BLOOD PRESSURE: 80 MMHG | HEART RATE: 98 BPM | SYSTOLIC BLOOD PRESSURE: 132 MMHG | TEMPERATURE: 97.5 F | OXYGEN SATURATION: 97 % | BODY MASS INDEX: 29.03 KG/M2 | HEIGHT: 67 IN | RESPIRATION RATE: 16 BRPM | WEIGHT: 185 LBS

## 2024-10-04 DIAGNOSIS — N18.32 STAGE 3B CHRONIC KIDNEY DISEASE: ICD-10-CM

## 2024-10-04 DIAGNOSIS — I10 ESSENTIAL HYPERTENSION: ICD-10-CM

## 2024-10-04 DIAGNOSIS — C61 PROSTATE CANCER: Primary | ICD-10-CM

## 2024-10-04 NOTE — PROGRESS NOTES
"Subjective  Blane Dietz is a 89 y.o. male    HPI coming in for follow-up patient with a history of CKD and hypertension history of prostate cancer and diabetes.  Currently on Lantus along with Trulicity and Jardiance following up with endocrinology.  Has had reasonable weight loss with dietary restrictions has had complete resolution of his lower extremity edema is more active now    The following portions of the patient's history were reviewed and updated as appropriate: allergies, current medications, past family history, past medical history, past social history, past surgical history, and problem list.     Review of Systems   Constitutional:  Positive for fatigue. Negative for activity change, appetite change and fever.   HENT:  Negative for congestion, ear discharge, ear pain and trouble swallowing.    Eyes:  Negative for photophobia and visual disturbance.   Respiratory:  Negative for cough and shortness of breath.    Cardiovascular:  Negative for chest pain and palpitations.   Gastrointestinal:  Negative for abdominal distention, abdominal pain, constipation, diarrhea, nausea and vomiting.   Endocrine: Negative.    Genitourinary:  Negative for dysuria, hematuria and urgency.   Musculoskeletal:  Positive for arthralgias. Negative for back pain, joint swelling and myalgias.   Skin:  Negative for color change and rash.   Allergic/Immunologic: Negative.    Neurological:  Negative for dizziness, weakness, light-headedness and headaches.   Hematological:  Negative for adenopathy. Does not bruise/bleed easily.   Psychiatric/Behavioral:  Positive for sleep disturbance. Negative for agitation, confusion and dysphoric mood. The patient is not nervous/anxious.        Visit Vitals  /80   Pulse 98   Temp 97.5 °F (36.4 °C)   Resp 16   Ht 168.9 cm (66.5\")   Wt 83.9 kg (185 lb)   SpO2 97%   BMI 29.41 kg/m²       Objective  Physical Exam  Constitutional:       General: He is not in acute distress.     Appearance: He " is well-developed.   HENT:      Nose: Nose normal.   Eyes:      General: No scleral icterus.     Conjunctiva/sclera: Conjunctivae normal.   Neck:      Thyroid: No thyromegaly.      Trachea: No tracheal deviation.   Cardiovascular:      Rate and Rhythm: Normal rate and regular rhythm.      Heart sounds: No murmur heard.     No friction rub.   Pulmonary:      Effort: No respiratory distress.      Breath sounds: No wheezing or rales.   Abdominal:      General: There is no distension.      Palpations: Abdomen is soft. There is no mass.      Tenderness: There is no abdominal tenderness. There is no guarding.   Musculoskeletal:         General: Deformity present. Normal range of motion.   Lymphadenopathy:      Cervical: No cervical adenopathy.   Skin:     General: Skin is warm and dry.      Findings: No erythema or rash.   Neurological:      Mental Status: He is alert and oriented to person, place, and time.      Cranial Nerves: No cranial nerve deficit.      Coordination: Coordination normal.      Deep Tendon Reflexes: Reflexes are normal and symmetric.   Psychiatric:         Behavior: Behavior normal.         Thought Content: Thought content normal.         Judgment: Judgment normal.       Diagnoses and all orders for this visit:    Prostate cancer stable .  Status post prostatectomy.has some urinary incontinence prescription for adult diapers  -     Miscellaneous DME    Stage 3b chronic kidney disease stable follow labs    Essential hypertension continue with current meds BP control okay    Other orders  -     Fluzone High-Dose 65+yrs ([id number])

## 2024-10-07 RX ORDER — PRAVASTATIN SODIUM 40 MG
TABLET ORAL
Qty: 90 TABLET | Refills: 3 | Status: SHIPPED | OUTPATIENT
Start: 2024-10-07

## 2024-10-07 RX ORDER — ALLOPURINOL 100 MG/1
100 TABLET ORAL DAILY
Qty: 90 TABLET | Refills: 3 | Status: SHIPPED | OUTPATIENT
Start: 2024-10-07

## 2024-10-07 RX ORDER — EMPAGLIFLOZIN 25 MG/1
25 TABLET, FILM COATED ORAL DAILY
Qty: 90 TABLET | Refills: 3 | Status: SHIPPED | OUTPATIENT
Start: 2024-10-07

## 2024-10-07 RX ORDER — METOPROLOL SUCCINATE 25 MG/1
12.5 TABLET, EXTENDED RELEASE ORAL DAILY
Qty: 45 TABLET | Refills: 3 | Status: SHIPPED | OUTPATIENT
Start: 2024-10-07

## 2024-10-16 ENCOUNTER — OFFICE VISIT (OUTPATIENT)
Dept: ONCOLOGY | Facility: CLINIC | Age: 89
End: 2024-10-16
Payer: MEDICARE

## 2024-10-16 ENCOUNTER — HOSPITAL ENCOUNTER (OUTPATIENT)
Facility: HOSPITAL | Age: 89
Discharge: HOME | End: 2024-10-16
Admitting: INTERNAL MEDICINE
Payer: MEDICARE

## 2024-10-16 VITALS
RESPIRATION RATE: 32 BRPM | TEMPERATURE: 96.4 F | BODY MASS INDEX: 31.98 KG/M2 | HEART RATE: 96 BPM | SYSTOLIC BLOOD PRESSURE: 145 MMHG | DIASTOLIC BLOOD PRESSURE: 67 MMHG | OXYGEN SATURATION: 98 % | HEIGHT: 66 IN | WEIGHT: 199 LBS

## 2024-10-16 DIAGNOSIS — C83.398 DIFFUSE LARGE B-CELL LYMPHOMA OF SOLID ORGAN EXCLUDING SPLEEN: Primary | ICD-10-CM

## 2024-10-16 DIAGNOSIS — C61 PROSTATE CANCER: Primary | ICD-10-CM

## 2024-10-16 LAB
ALBUMIN SERPL-MCNC: 4.1 G/DL (ref 3.5–5.2)
ALBUMIN/GLOB SERPL: 1.6 G/DL
ALP SERPL-CCNC: 99 U/L (ref 39–117)
ALT SERPL W P-5'-P-CCNC: 14 U/L (ref 1–41)
ANION GAP SERPL CALCULATED.3IONS-SCNC: 12.5 MMOL/L (ref 5–15)
AST SERPL-CCNC: 23 U/L (ref 1–40)
BASOPHILS # BLD AUTO: 0.1 10*3/MM3 (ref 0–0.2)
BASOPHILS NFR BLD AUTO: 1.2 % (ref 0–1.5)
BILIRUB SERPL-MCNC: 1.1 MG/DL (ref 0–1.2)
BUN SERPL-MCNC: 26 MG/DL (ref 8–23)
BUN/CREAT SERPL: 14.9 (ref 7–25)
CALCIUM SPEC-SCNC: 8.8 MG/DL (ref 8.6–10.5)
CHLORIDE SERPL-SCNC: 104 MMOL/L (ref 98–107)
CO2 SERPL-SCNC: 22.5 MMOL/L (ref 22–29)
CREAT SERPL-MCNC: 1.75 MG/DL (ref 0.76–1.27)
DEPRECATED RDW RBC AUTO: 50.4 FL (ref 37–54)
EGFRCR SERPLBLD CKD-EPI 2021: 37.2 ML/MIN/1.73
EOSINOPHIL # BLD AUTO: 0.27 10*3/MM3 (ref 0–0.4)
EOSINOPHIL NFR BLD AUTO: 3.3 % (ref 0.3–6.2)
ERYTHROCYTE [DISTWIDTH] IN BLOOD BY AUTOMATED COUNT: 14.9 % (ref 12.3–15.4)
GLOBULIN UR ELPH-MCNC: 2.6 GM/DL
GLUCOSE SERPL-MCNC: 212 MG/DL (ref 65–99)
HCT VFR BLD AUTO: 43.1 % (ref 37.5–51)
HGB BLD-MCNC: 14.3 G/DL (ref 13–17.7)
IMM GRANULOCYTES # BLD AUTO: 0.11 10*3/MM3 (ref 0–0.05)
IMM GRANULOCYTES NFR BLD AUTO: 1.4 % (ref 0–0.5)
LYMPHOCYTES # BLD AUTO: 1.24 10*3/MM3 (ref 0.7–3.1)
LYMPHOCYTES NFR BLD AUTO: 15.3 % (ref 19.6–45.3)
MAGNESIUM SERPL-MCNC: 2.5 MG/DL (ref 1.6–2.4)
MCH RBC QN AUTO: 30.6 PG (ref 26.6–33)
MCHC RBC AUTO-ENTMCNC: 33.2 G/DL (ref 31.5–35.7)
MCV RBC AUTO: 92.3 FL (ref 79–97)
MONOCYTES # BLD AUTO: 0.69 10*3/MM3 (ref 0.1–0.9)
MONOCYTES NFR BLD AUTO: 8.5 % (ref 5–12)
NEUTROPHILS NFR BLD AUTO: 5.68 10*3/MM3 (ref 1.7–7)
NEUTROPHILS NFR BLD AUTO: 70.3 % (ref 42.7–76)
NRBC BLD AUTO-RTO: 0 /100 WBC (ref 0–0.2)
PHOSPHATE SERPL-MCNC: 3.4 MG/DL (ref 2.5–4.5)
PLATELET # BLD AUTO: 105 10*3/MM3 (ref 140–450)
PMV BLD AUTO: 10.6 FL (ref 6–12)
POTASSIUM SERPL-SCNC: 4.6 MMOL/L (ref 3.5–5.2)
PROT SERPL-MCNC: 6.7 G/DL (ref 6–8.5)
PSA SERPL-MCNC: 0.89 NG/ML (ref 0–4)
RBC # BLD AUTO: 4.67 10*6/MM3 (ref 4.14–5.8)
SODIUM SERPL-SCNC: 139 MMOL/L (ref 136–145)
WBC NRBC COR # BLD AUTO: 8.09 10*3/MM3 (ref 3.4–10.8)

## 2024-10-16 PROCEDURE — 80053 COMPREHEN METABOLIC PANEL: CPT | Performed by: INTERNAL MEDICINE

## 2024-10-16 PROCEDURE — 85025 COMPLETE CBC W/AUTO DIFF WBC: CPT | Performed by: INTERNAL MEDICINE

## 2024-10-16 PROCEDURE — 25010000002 DENOSUMAB 120 MG/1.7ML SOLUTION: Performed by: INTERNAL MEDICINE

## 2024-10-16 PROCEDURE — 83735 ASSAY OF MAGNESIUM: CPT | Performed by: INTERNAL MEDICINE

## 2024-10-16 PROCEDURE — 84100 ASSAY OF PHOSPHORUS: CPT | Performed by: INTERNAL MEDICINE

## 2024-10-16 PROCEDURE — 99214 OFFICE O/P EST MOD 30 MIN: CPT | Performed by: INTERNAL MEDICINE

## 2024-10-16 PROCEDURE — 96372 THER/PROPH/DIAG INJ SC/IM: CPT

## 2024-10-16 PROCEDURE — 84153 ASSAY OF PSA TOTAL: CPT | Performed by: INTERNAL MEDICINE

## 2024-10-16 PROCEDURE — 25010000002 LEUPROLIDE 7.5 MG KIT: Performed by: INTERNAL MEDICINE

## 2024-10-16 PROCEDURE — 96402 CHEMO HORMON ANTINEOPL SQ/IM: CPT

## 2024-10-16 PROCEDURE — 1126F AMNT PAIN NOTED NONE PRSNT: CPT | Performed by: INTERNAL MEDICINE

## 2024-10-16 RX ADMIN — DENOSUMAB 120 MG: 120 INJECTION SUBCUTANEOUS at 15:16

## 2024-10-16 RX ADMIN — LEUPROLIDE ACETATE 7.5 MG: 7.5 INJECTION, SUSPENSION, EXTENDED RELEASE SUBCUTANEOUS at 15:16

## 2024-10-16 NOTE — PROGRESS NOTES
DATE OF VISIT: 10/16/2024    REASON FOR VISIT: Followup for: 1.  Diffuse large B-cell lymphoma 2.  Prostate cancer     PROBLEM LIST:  1. Prostate cancer, initially presented as [id number] status post radical  prostatectomy 2000.  A.  Patient is on Lupron plus Prolia  B.  Tried to add apalutamide second rising PSA patient declined.  C.  Whole-body PSMA PET scan done September 2023 revealed 2 separate areas of uptake in the skeleton at the L1 left transverse process and close to cartilage junction of the right third anterior rib.  D.  We will start Eligard with Xtandi September 20, 2023  2.  Right-sided pulmonary embolisms:  A.  Currently on Eliquis twice a day  3.  Type 2 diabetes  4. Hhypertension  5. Hypercholesterolemia.  6.  Right subsegmental PE:  A. Started on Eliquis twice a day March 2021  7.  Osteopenia  8.  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  Status post right nephrectomy December 7, 2021  B.  Started adjuvant chemotherapy R-CHOP January 14, 2022, status post 3 cycles completed February 23, 2022      HISTORY OF PRESENT ILLNESS: The patient is a very pleasant 89 y.o. male with past medical history significant for diffuse large B-cell lymphoma right kidney diagnosed December 2021.  Patient status post 3 cycles of chemotherapy with R-CHOP.  Repeat PET scan showed no evidence of residual disease. The patient is here today for scheduled follow-up visit.    SUBJECTIVE: Geovanny is here today by himself.  All in all is doing well.  He missed 1 day of his medicine last week.  Denies any pain.    Past History:  Medical History: has a past medical history of Aortic stenosis, Arthritis, Bilateral impacted cerumen (11/23/2016), Bronchitis, CHF (congestive heart failure), Chronic gout of right foot (06/13/2016), COVID-19 vaccine series completed (05/12/2021), Depression, Diabetes mellitus, Diverticulitis, Exogenous obesity, Gout, Heart murmur, History of vitamin D deficiency, Hypercholesteremia  (08/11/2016), Hyperlipidemia, Hypertension, Kidney lesion, Malignant neoplasm of prostate, Morbid obesity (08/11/2016), Pneumonia (05/12/2021), Primary osteoarthritis involving multiple joints (06/13/2016), Pulmonary embolism, Sleep apnea (05/12/2021), Uncontrolled type 2 diabetes mellitus with hyperglycemia (06/13/2016), and Vertigo.   Surgical History: has a past surgical history that includes Colostomy (1999); Other surgical history; Exploratory laparotomy; Colonoscopy; Revision Colostomy; Prostate surgery; Prostatectomy (2000); Colon surgery; Tonsillectomy; Skin cancer excision; Lipoma Excision; Mohs surgery; Cardiac valve replacement (05/12/2021); nephroureterectomy (Right, 12/7/2021); and Cystoscopy (N/A, 12/7/2021).   Family History: family history includes Breast cancer in an other family member; Cancer in his mother and another family member; Diabetes in his mother and another family member; Heart disease in his father; Hypertension in an other family member; Lung cancer in his mother; Migraines in an other family member; Obesity in an other family member.   Social History: reports that he has never smoked. He has never been exposed to tobacco smoke. He has never used smokeless tobacco. He reports that he does not drink alcohol and does not use drugs.    (Not in a hospital admission)     Allergies: Ampicillin, Medrol [methylprednisolone], Myrbetriq [mirabegron], Penicillins, Bee venom, and Melatonin     Review of Systems   Constitutional:  Positive for fatigue.   Respiratory:  Positive for shortness of breath.    Musculoskeletal:  Positive for arthralgias.         Current Outpatient Medications:     allopurinol (ZYLOPRIM) 100 MG tablet, TAKE 1 TABLET BY MOUTH DAILY, Disp: 90 tablet, Rfl: 3    amiodarone (PACERONE) 200 MG tablet, Take 1 tablet by mouth Daily., Disp: , Rfl:     Apoaequorin (PREVAGEN PO), Take  by mouth., Disp: , Rfl:     ascorbic acid (VITAMIN C) 1000 MG tablet, Take 1 tablet by mouth Daily.,  "Disp: , Rfl:     Calcium Carb-Cholecalciferol 600-5 MG-MCG tablet, Take 1 tablet by mouth Every Other Day. Indications: Low Amount of Calcium in the Blood, Disp: 15 tablet, Rfl: 3    ciclopirox (LOPROX) 1 % shampoo, , Disp: , Rfl:     CINNAMON PO, Take  by mouth., Disp: , Rfl:     Droplet Insulin Syringe 31G X 5/16\" 0.5 ML misc, , Disp: , Rfl:     Dulaglutide (Trulicity) 3 MG/0.5ML solution pen-injector, Inject 0.5 mL under the skin into the appropriate area as directed 1 (One) Time Per Week., Disp: 6 mL, Rfl: 3    Entresto 24-26 MG tablet, TAKE ONE TABLET BY MOUTH EVERY 12 HOURS, Disp: 180 tablet, Rfl: 3    enzalutamide (XTANDI) 40 MG tablet tablet, Take 3 tablets by mouth Daily., Disp: 90 tablet, Rfl: 11    fluocinonide (LIDEX) 0.05 % external solution, , Disp: , Rfl:     fluticasone (FLONASE) 50 MCG/ACT nasal spray, 2 sprays into the nostril(s) as directed by provider Daily. 2 puffs each nostril (Patient taking differently: Administer 2 sprays into the nostril(s) as directed by provider Daily As Needed for Rhinitis or Allergies.), Disp: 1 bottle, Rfl: 0    glucose blood (True Metrix Blood Glucose Test) test strip, Use to test blood sugar 3 times daily.  Dx E11.65, Disp: 300 each, Rfl: 3    Jardiance 25 MG tablet tablet, TAKE 1 TABLET BY MOUTH DAILY, Disp: 90 tablet, Rfl: 3    ketoconazole (NIZORAL) 2 % shampoo, Apply 1 application  topically to the appropriate area as directed 2 (Two) Times a Week. Indications: Tinea Versicolor, Disp: , Rfl:     Lantus 100 UNIT/ML injection, Inject up to 50 units daily subcutaneously (Patient taking differently: Inject up to 20 units daily subcutaneously), Disp: 20 mL, Rfl: 5    metoprolol succinate XL (TOPROL-XL) 25 MG 24 hr tablet, TAKE 1/2 TABLET BY MOUTH DAILY, Disp: 45 tablet, Rfl: 3    multivitamin with minerals tablet tablet, Take 1 tablet by mouth Daily. AREDS 2  Indications: vitamin deficiency, Disp: , Rfl:     pravastatin (PRAVACHOL) 40 MG tablet, TAKE ONE TABLET BY " "MOUTH EVERY NIGHT AT BEDTIME, Disp: 90 tablet, Rfl: 3    triamcinolone (KENALOG) 0.1 % cream, Apply 1 Application topically to the appropriate area as directed 2 (Two) Times a Day., Disp: , Rfl:     VITAMIN D, CHOLECALCIFEROL, PO, Take  by mouth., Disp: , Rfl:   No current facility-administered medications for this visit.    PHYSICAL EXAMINATION:   /67 Comment: RUE  Pulse 96   Temp 96.4 °F (35.8 °C) (Infrared)   Resp (!) 32   Ht 167.6 cm (66\")   Wt 90.3 kg (199 lb)   SpO2 98% Comment: RA  BMI 32.12 kg/m²    Pain Score    10/16/24 1427   PainSc: 0-No pain                    ECOG score: 2            ECOG Performance Status: 2 - Symptomatic, <50% confined to bed  General Appearance:  alert, cooperative, no apparent distress and appears stated age   Neurologic/Psychiatric: A&O x 3, gait steady, appropriate affect, strength 5/5 in all muscle groups   HEENT:  Normocephalic, without obvious abnormality, mucous membranes moist   Neck: Supple, symmetrical, trachea midline, no adenopathy;  No thyromegaly, masses, or tenderness   Lungs:   Clear to auscultation bilaterally; respirations regular, even, and unlabored bilaterally   Heart:  Regular rate and rhythm, no murmurs appreciated   Abdomen:   Soft, non-tender, and non-distended   Lymph nodes: No cervical, supraclavicular, inguinal or axillary adenopathy noted   Extremities:  +2 bilateral lower extremities edema    Skin: No rashes, ulcers, or suspicious lesions noted     Hospital Outpatient Visit on 10/16/2024   Component Date Value Ref Range Status    WBC 10/16/2024 8.09  3.40 - 10.80 10*3/mm3 Final    RBC 10/16/2024 4.67  4.14 - 5.80 10*6/mm3 Final    Hemoglobin 10/16/2024 14.3  13.0 - 17.7 g/dL Final    Hematocrit 10/16/2024 43.1  37.5 - 51.0 % Final    MCV 10/16/2024 92.3  79.0 - 97.0 fL Final    MCH 10/16/2024 30.6  26.6 - 33.0 pg Final    MCHC 10/16/2024 33.2  31.5 - 35.7 g/dL Final    RDW 10/16/2024 14.9  12.3 - 15.4 % Final    RDW-SD 10/16/2024 50.4  " 37.0 - 54.0 fl Final    MPV 10/16/2024 10.6  6.0 - 12.0 fL Final    Platelets 10/16/2024 105 (L)  140 - 450 10*3/mm3 Final    Neutrophil % 10/16/2024 70.3  42.7 - 76.0 % Final    Lymphocyte % 10/16/2024 15.3 (L)  19.6 - 45.3 % Final    Monocyte % 10/16/2024 8.5  5.0 - 12.0 % Final    Eosinophil % 10/16/2024 3.3  0.3 - 6.2 % Final    Basophil % 10/16/2024 1.2  0.0 - 1.5 % Final    Immature Grans % 10/16/2024 1.4 (H)  0.0 - 0.5 % Final    Neutrophils, Absolute 10/16/2024 5.68  1.70 - 7.00 10*3/mm3 Final    Lymphocytes, Absolute 10/16/2024 1.24  0.70 - 3.10 10*3/mm3 Final    Monocytes, Absolute 10/16/2024 0.69  0.10 - 0.90 10*3/mm3 Final    Eosinophils, Absolute 10/16/2024 0.27  0.00 - 0.40 10*3/mm3 Final    Basophils, Absolute 10/16/2024 0.10  0.00 - 0.20 10*3/mm3 Final    Immature Grans, Absolute 10/16/2024 0.11 (H)  0.00 - 0.05 10*3/mm3 Final    nRBC 10/16/2024 0.0  0.0 - 0.2 /100 WBC Final        No results found.    ASSESSMENT: The patient is a very pleasant 89 y.o. male  with diffuse large B-cell lymphoma      PLAN:    1.  Diffuse large B-cell lymphoma stage Ia:  A.  Scan results from May 20, 2024 while it had few abnormalities there was no evidence of relapsed lymphoma or metastatic prostate cancer.  B.  The patient had 8 mm cystic lesion in the pancreas which we will watch for now.  C.  I will repeat the patient's scans in 6 months and that would be due mid November 2024.  Those will be ordered for prior to return.    2.  Prostate cancer:  A.  I will continue the patient on Lupron monthly.  B. I will continue the patient on Xtandi 120 mg daily.  We will consider increasing the dose to full dose of 160 mg daily if he is doing well.  VERITO HERCULES discussed with the patient his blood results from September 18 , 2024.  Mild thrombocytopenia platelet count of 115,000 normal white cell and hemoglobin.  PSA did slightly increase to 0.55 from August 14, 20240.212.  We will watch this carefully.    3.  Bone metastases:  PANCHITO HERCULES  will continue the patient on Xgeva 120 mg subcu every 4 weeks.  B.  I will switch him to every 3 months after the first year.  Will be due September 2024.  C.  I will continue the patient on calcium and vitamin D daily.    4.  Right-sided pulmonary embolism:  A. The patient has completed 1 year of treatment.  His repeated CT PE protocol in June 2022 did not reveal any evidence of recurrent PEs.    5.  Hypercholesteremia:  A.  The patient continue pravastatin daily.    6.  Type 2 diabetes:  A.  The patient will continue [place] and Trulicity.    FOLLOW UP: 1 month with Eligard treatment and blood work.    Shannan Ramon MD  10/16/2024

## 2024-10-17 ENCOUNTER — SPECIALTY PHARMACY (OUTPATIENT)
Facility: HOSPITAL | Age: 89
End: 2024-10-17
Payer: MEDICARE

## 2024-10-24 RX ORDER — SACUBITRIL AND VALSARTAN 24; 26 MG/1; MG/1
TABLET, FILM COATED ORAL
Qty: 180 TABLET | Refills: 3 | Status: SHIPPED | OUTPATIENT
Start: 2024-10-24

## 2024-11-04 ENCOUNTER — SPECIALTY PHARMACY (OUTPATIENT)
Dept: ONCOLOGY | Facility: HOSPITAL | Age: 89
End: 2024-11-04
Payer: MEDICARE

## 2024-11-04 ENCOUNTER — TELEPHONE (OUTPATIENT)
Dept: INTERNAL MEDICINE | Facility: CLINIC | Age: 89
End: 2024-11-04
Payer: MEDICARE

## 2024-11-04 NOTE — TELEPHONE ENCOUNTER
"  Caller: Blane Dietz \"CHEO\"    Relationship: Self    Best call back number:  [phone]    What medication are you requesting: DEPENDS    Have you had these symptoms before:     Yes  [x] No  []  Have you been treated for these symptoms before:   [x] Yes  [] No    If a prescription is needed, what is your preferred pharmacy and phone number:      Additional notes: PATIENT IS ASKING IF DR CHAUDHARY CAN MAIL HIM A PRESCRIPTION AND WHERE DOES HE GET THESE AT?       [place] [address]       PLEASE CALL TO ADVISE      "

## 2024-11-04 NOTE — PROGRESS NOTES
"Specialty Pharmacy Refill Coordination Note     Blane \"CHEO\" is a 89 y.o. male contacted today regarding refills of  enzalutamide 120mg PO QD specialty medication(s).    Reviewed and verified with patient: yes  Specialty medication(s) and dose(s) confirmed: yes    Refill Questions      Flowsheet Row Most Recent Value   Changes to allergies? No   Changes to medications? No   New conditions or infections since last clinic visit No   Unplanned office visit, urgent care, ED, or hospital admission in the last 4 weeks  No   How does patient/caregiver feel medication is working? Good   Financial problems or insurance changes  No   Since the previous refill, were any specialty medication doses or scheduled injections missed or delayed?  No   If yes, please provide the amount n/a   Why were doses missed? n/a   Does this patient require a clinical escalation to a pharmacist? No            Delivery Questions      Flowsheet Row Most Recent Value   Delivery method [employer]   Delivery address verified with patient/caregiver? Yes   Delivery address Temporary   Number of medications in delivery 1   Medication(s) being filled and delivered Enzalutamide (XTANDI)   Doses left of specialty medications about a week left   Copay verified? Yes   Copay amount 0$   Copay form of payment No copayment ($0)   Ship Date 11/4   Delivery Date 11/5   Signature Required No                   Follow-up: 30 day(s)     Blas Calle, Pharmacy Technician  Specialty Pharmacy Technician        "

## 2024-11-13 ENCOUNTER — HOSPITAL ENCOUNTER (OUTPATIENT)
Facility: HOSPITAL | Age: 89
Discharge: HOME | End: 2024-11-13
Admitting: INTERNAL MEDICINE
Payer: MEDICARE

## 2024-11-13 ENCOUNTER — OFFICE VISIT (OUTPATIENT)
Dept: ONCOLOGY | Facility: CLINIC | Age: 89
End: 2024-11-13
Payer: MEDICARE

## 2024-11-13 VITALS
SYSTOLIC BLOOD PRESSURE: 129 MMHG | RESPIRATION RATE: 16 BRPM | HEIGHT: 66 IN | DIASTOLIC BLOOD PRESSURE: 69 MMHG | TEMPERATURE: 97.5 F | HEART RATE: 97 BPM | BODY MASS INDEX: 31.98 KG/M2 | OXYGEN SATURATION: 95 % | WEIGHT: 199 LBS

## 2024-11-13 DIAGNOSIS — C61 PROSTATE CANCER: Primary | ICD-10-CM

## 2024-11-13 DIAGNOSIS — C61 PROSTATE CANCER: ICD-10-CM

## 2024-11-13 DIAGNOSIS — C83.398 DIFFUSE LARGE B-CELL LYMPHOMA OF SOLID ORGAN EXCLUDING SPLEEN: Primary | ICD-10-CM

## 2024-11-13 LAB
ALBUMIN SERPL-MCNC: 4.1 G/DL (ref 3.5–5.2)
ALBUMIN/GLOB SERPL: 1.5 G/DL
ALP SERPL-CCNC: 127 U/L (ref 39–117)
ALT SERPL W P-5'-P-CCNC: 16 U/L (ref 1–41)
ANION GAP SERPL CALCULATED.3IONS-SCNC: 11.8 MMOL/L (ref 5–15)
AST SERPL-CCNC: 25 U/L (ref 1–40)
BASOPHILS # BLD AUTO: 0.1 10*3/MM3 (ref 0–0.2)
BASOPHILS NFR BLD AUTO: 1.1 % (ref 0–1.5)
BILIRUB SERPL-MCNC: 1.1 MG/DL (ref 0–1.2)
BUN SERPL-MCNC: 26 MG/DL (ref 8–23)
BUN/CREAT SERPL: 15 (ref 7–25)
CALCIUM SPEC-SCNC: 9 MG/DL (ref 8.6–10.5)
CHLORIDE SERPL-SCNC: 103 MMOL/L (ref 98–107)
CO2 SERPL-SCNC: 25.2 MMOL/L (ref 22–29)
CREAT SERPL-MCNC: 1.73 MG/DL (ref 0.76–1.27)
DEPRECATED RDW RBC AUTO: 51.2 FL (ref 37–54)
EGFRCR SERPLBLD CKD-EPI 2021: 37.7 ML/MIN/1.73
EOSINOPHIL # BLD AUTO: 0.29 10*3/MM3 (ref 0–0.4)
EOSINOPHIL NFR BLD AUTO: 3.2 % (ref 0.3–6.2)
ERYTHROCYTE [DISTWIDTH] IN BLOOD BY AUTOMATED COUNT: 14.8 % (ref 12.3–15.4)
GLOBULIN UR ELPH-MCNC: 2.8 GM/DL
GLUCOSE SERPL-MCNC: 241 MG/DL (ref 65–99)
HCT VFR BLD AUTO: 43.5 % (ref 37.5–51)
HGB BLD-MCNC: 13.9 G/DL (ref 13–17.7)
IMM GRANULOCYTES # BLD AUTO: 0.14 10*3/MM3 (ref 0–0.05)
IMM GRANULOCYTES NFR BLD AUTO: 1.6 % (ref 0–0.5)
LYMPHOCYTES # BLD AUTO: 0.99 10*3/MM3 (ref 0.7–3.1)
LYMPHOCYTES NFR BLD AUTO: 11 % (ref 19.6–45.3)
MAGNESIUM SERPL-MCNC: 2.5 MG/DL (ref 1.6–2.4)
MCH RBC QN AUTO: 30.1 PG (ref 26.6–33)
MCHC RBC AUTO-ENTMCNC: 32 G/DL (ref 31.5–35.7)
MCV RBC AUTO: 94.2 FL (ref 79–97)
MONOCYTES # BLD AUTO: 0.79 10*3/MM3 (ref 0.1–0.9)
MONOCYTES NFR BLD AUTO: 8.8 % (ref 5–12)
NEUTROPHILS NFR BLD AUTO: 6.65 10*3/MM3 (ref 1.7–7)
NEUTROPHILS NFR BLD AUTO: 74.3 % (ref 42.7–76)
NRBC BLD AUTO-RTO: 0 /100 WBC (ref 0–0.2)
PHOSPHATE SERPL-MCNC: 2.8 MG/DL (ref 2.5–4.5)
PLATELET # BLD AUTO: 127 10*3/MM3 (ref 140–450)
PMV BLD AUTO: 10.7 FL (ref 6–12)
POTASSIUM SERPL-SCNC: 5.2 MMOL/L (ref 3.5–5.2)
PROT SERPL-MCNC: 6.9 G/DL (ref 6–8.5)
RBC # BLD AUTO: 4.62 10*6/MM3 (ref 4.14–5.8)
SODIUM SERPL-SCNC: 140 MMOL/L (ref 136–145)
WBC NRBC COR # BLD AUTO: 8.96 10*3/MM3 (ref 3.4–10.8)

## 2024-11-13 PROCEDURE — 83735 ASSAY OF MAGNESIUM: CPT | Performed by: INTERNAL MEDICINE

## 2024-11-13 PROCEDURE — 84100 ASSAY OF PHOSPHORUS: CPT | Performed by: INTERNAL MEDICINE

## 2024-11-13 PROCEDURE — 25010000002 DENOSUMAB 120 MG/1.7ML SOLUTION: Performed by: INTERNAL MEDICINE

## 2024-11-13 PROCEDURE — 96402 CHEMO HORMON ANTINEOPL SQ/IM: CPT

## 2024-11-13 PROCEDURE — 99214 OFFICE O/P EST MOD 30 MIN: CPT | Performed by: INTERNAL MEDICINE

## 2024-11-13 PROCEDURE — 85025 COMPLETE CBC W/AUTO DIFF WBC: CPT | Performed by: INTERNAL MEDICINE

## 2024-11-13 PROCEDURE — 96372 THER/PROPH/DIAG INJ SC/IM: CPT

## 2024-11-13 PROCEDURE — 84153 ASSAY OF PSA TOTAL: CPT | Performed by: INTERNAL MEDICINE

## 2024-11-13 PROCEDURE — 1126F AMNT PAIN NOTED NONE PRSNT: CPT | Performed by: INTERNAL MEDICINE

## 2024-11-13 PROCEDURE — 80053 COMPREHEN METABOLIC PANEL: CPT | Performed by: INTERNAL MEDICINE

## 2024-11-13 PROCEDURE — 25010000002 LEUPROLIDE 7.5 MG KIT: Performed by: INTERNAL MEDICINE

## 2024-11-13 RX ADMIN — DENOSUMAB 120 MG: 120 INJECTION SUBCUTANEOUS at 14:39

## 2024-11-13 RX ADMIN — LEUPROLIDE ACETATE 7.5 MG: 7.5 INJECTION, SUSPENSION, EXTENDED RELEASE SUBCUTANEOUS at 15:04

## 2024-11-13 NOTE — PROGRESS NOTES
DATE OF VISIT: 11/13/2024    REASON FOR VISIT: Followup for: 1.  Diffuse large B-cell lymphoma 2.  Prostate cancer     PROBLEM LIST:  1. Prostate cancer, initially presented as [id number] status post radical  prostatectomy 2000.  A.  Patient is on Lupron plus Prolia  B.  Tried to add apalutamide second rising PSA patient declined.  C.  Whole-body PSMA PET scan done September 2023 revealed 2 separate areas of uptake in the skeleton at the L1 left transverse process and close to cartilage junction of the right third anterior rib.  D.  We will start Eligard with Xtandi September 20, 2023  2.  Right-sided pulmonary embolisms:  A.  Currently on Eliquis twice a day  3.  Type 2 diabetes  4. Hhypertension  5. Hypercholesterolemia.  6.  Right subsegmental PE:  A. Started on Eliquis twice a day March 2021  7.  Osteopenia  8.  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  Status post right nephrectomy December 7, 2021  B.  Started adjuvant chemotherapy R-CHOP January 14, 2022, status post 3 cycles completed February 23, 2022      HISTORY OF PRESENT ILLNESS: The patient is a very pleasant 89 y.o. male with past medical history significant for diffuse large B-cell lymphoma right kidney diagnosed December 2021.  Patient status post 3 cycles of chemotherapy with R-CHOP.  Repeat PET scan showed no evidence of residual disease. The patient is here today for scheduled follow-up visit.    SUBJECTIVE: Geovanny is here today by himself.  He could not get the scan done last week.  He is recovering from recent upper airway infection.    Past History:  Medical History: has a past medical history of Aortic stenosis, Arthritis, Bilateral impacted cerumen (11/23/2016), Bronchitis, CHF (congestive heart failure), Chronic gout of right foot (06/13/2016), COVID-19 vaccine series completed (05/12/2021), Depression, Diabetes mellitus, Diverticulitis, Exogenous obesity, Gout, Heart murmur, History of vitamin D deficiency,  Hypercholesteremia (08/11/2016), Hyperlipidemia, Hypertension, Kidney lesion, Malignant neoplasm of prostate, Morbid obesity (08/11/2016), Pneumonia (05/12/2021), Primary osteoarthritis involving multiple joints (06/13/2016), Pulmonary embolism, Sleep apnea (05/12/2021), Uncontrolled type 2 diabetes mellitus with hyperglycemia (06/13/2016), and Vertigo.   Surgical History: has a past surgical history that includes Colostomy (1999); Other surgical history; Exploratory laparotomy; Colonoscopy; Revision Colostomy; Prostate surgery; Prostatectomy (2000); Colon surgery; Tonsillectomy; Skin cancer excision; Lipoma Excision; Mohs surgery; Cardiac valve replacement (05/12/2021); nephroureterectomy (Right, 12/7/2021); and Cystoscopy (N/A, 12/7/2021).   Family History: family history includes Breast cancer in an other family member; Cancer in his mother and another family member; Diabetes in his mother and another family member; Heart disease in his father; Hypertension in an other family member; Lung cancer in his mother; Migraines in an other family member; Obesity in an other family member.   Social History: reports that he has never smoked. He has never been exposed to tobacco smoke. He has never used smokeless tobacco. He reports that he does not drink alcohol and does not use drugs.    (Not in a hospital admission)     Allergies: Ampicillin, Medrol [methylprednisolone], Myrbetriq [mirabegron], Penicillins, Bee venom, and Melatonin     Review of Systems   Constitutional:  Positive for fatigue.   Respiratory:  Positive for shortness of breath.    Musculoskeletal:  Positive for arthralgias.         Current Outpatient Medications:     allopurinol (ZYLOPRIM) 100 MG tablet, TAKE 1 TABLET BY MOUTH DAILY, Disp: 90 tablet, Rfl: 3    amiodarone (PACERONE) 200 MG tablet, Take 1 tablet by mouth Daily., Disp: , Rfl:     Apoaequorin (PREVAGEN PO), Take  by mouth., Disp: , Rfl:     ascorbic acid (VITAMIN C) 1000 MG tablet, Take 1  "tablet by mouth Daily., Disp: , Rfl:     Calcium Carb-Cholecalciferol 600-5 MG-MCG tablet, Take 1 tablet by mouth Every Other Day. Indications: Low Amount of Calcium in the Blood, Disp: 15 tablet, Rfl: 3    ciclopirox (LOPROX) 1 % shampoo, , Disp: , Rfl:     CINNAMON PO, Take  by mouth., Disp: , Rfl:     Droplet Insulin Syringe 31G X 5/16\" 0.5 ML misc, , Disp: , Rfl:     Dulaglutide (Trulicity) 3 MG/0.5ML solution pen-injector, Inject 0.5 mL under the skin into the appropriate area as directed 1 (One) Time Per Week., Disp: 6 mL, Rfl: 3    Entresto 24-26 MG tablet, TAKE ONE TABLET BY MOUTH EVERY 12 HOURS, Disp: 180 tablet, Rfl: 3    enzalutamide (XTANDI) 40 MG tablet tablet, Take 3 tablets by mouth Daily., Disp: 90 tablet, Rfl: 11    fluocinonide (LIDEX) 0.05 % external solution, , Disp: , Rfl:     fluticasone (FLONASE) 50 MCG/ACT nasal spray, 2 sprays into the nostril(s) as directed by provider Daily. 2 puffs each nostril (Patient taking differently: Administer 2 sprays into the nostril(s) as directed by provider Daily As Needed for Rhinitis or Allergies.), Disp: 1 bottle, Rfl: 0    glucose blood (True Metrix Blood Glucose Test) test strip, Use to test blood sugar 3 times daily.  Dx E11.65, Disp: 300 each, Rfl: 3    Jardiance 25 MG tablet tablet, TAKE 1 TABLET BY MOUTH DAILY, Disp: 90 tablet, Rfl: 3    ketoconazole (NIZORAL) 2 % shampoo, Apply 1 application  topically to the appropriate area as directed 2 (Two) Times a Week. Indications: Tinea Versicolor, Disp: , Rfl:     Lantus 100 UNIT/ML injection, Inject up to 50 units daily subcutaneously (Patient taking differently: Inject up to 20 units daily subcutaneously), Disp: 20 mL, Rfl: 5    metoprolol succinate XL (TOPROL-XL) 25 MG 24 hr tablet, TAKE 1/2 TABLET BY MOUTH DAILY, Disp: 45 tablet, Rfl: 3    multivitamin with minerals tablet tablet, Take 1 tablet by mouth Daily. AREDS 2  Indications: vitamin deficiency, Disp: , Rfl:     pravastatin (PRAVACHOL) 40 MG " "tablet, TAKE ONE TABLET BY MOUTH EVERY NIGHT AT BEDTIME, Disp: 90 tablet, Rfl: 3    triamcinolone (KENALOG) 0.1 % cream, Apply 1 Application topically to the appropriate area as directed 2 (Two) Times a Day., Disp: , Rfl:     VITAMIN D, CHOLECALCIFEROL, PO, Take  by mouth., Disp: , Rfl:     PHYSICAL EXAMINATION:   /69   Pulse 97   Temp 97.5 °F (36.4 °C)   Resp 16   Ht 167.6 cm (65.98\")   Wt 90.3 kg (199 lb)   SpO2 95%   BMI 32.14 kg/m²    Pain Score    11/13/24 1335   PainSc: 0-No pain                    ECOG score: 2            ECOG Performance Status: 2 - Symptomatic, <50% confined to bed  General Appearance:  alert, cooperative, no apparent distress and appears stated age   Neurologic/Psychiatric: A&O x 3, gait steady, appropriate affect, strength 5/5 in all muscle groups   HEENT:  Normocephalic, without obvious abnormality, mucous membranes moist   Neck: Supple, symmetrical, trachea midline, no adenopathy;  No thyromegaly, masses, or tenderness   Lungs:   Clear to auscultation bilaterally; respirations regular, even, and unlabored bilaterally   Heart:  Regular rate and rhythm, no murmurs appreciated   Abdomen:   Soft, non-tender, and non-distended   Lymph nodes: No cervical, supraclavicular, inguinal or axillary adenopathy noted   Extremities:  +2 bilateral lower extremities edema    Skin: No rashes, ulcers, or suspicious lesions noted     No visits with results within 2 Week(s) from this visit.   Latest known visit with results is:   Hospital Outpatient Visit on 10/16/2024   Component Date Value Ref Range Status    Glucose 10/16/2024 212 (H)  65 - 99 mg/dL Final    Glucose >180, Hemoglobin A1C recommended.    BUN 10/16/2024 26 (H)  8 - 23 mg/dL Final    Creatinine 10/16/2024 1.75 (H)  0.76 - 1.27 mg/dL Final    Sodium 10/16/2024 139  136 - 145 mmol/L Final    Potassium 10/16/2024 4.6  3.5 - 5.2 mmol/L Final    Chloride 10/16/2024 104  98 - 107 mmol/L Final    CO2 10/16/2024 22.5  22.0 - 29.0 mmol/L " Final    Calcium 10/16/2024 8.8  8.6 - 10.5 mg/dL Final    Total Protein 10/16/2024 6.7  6.0 - 8.5 g/dL Final    Albumin 10/16/2024 4.1  3.5 - 5.2 g/dL Final    ALT (SGPT) 10/16/2024 14  1 - 41 U/L Final    AST (SGOT) 10/16/2024 23  1 - 40 U/L Final    Alkaline Phosphatase 10/16/2024 99  39 - 117 U/L Final    Total Bilirubin 10/16/2024 1.1  0.0 - 1.2 mg/dL Final    Globulin 10/16/2024 2.6  gm/dL Final    A/G Ratio 10/16/2024 1.6  g/dL Final    BUN/Creatinine Ratio 10/16/2024 14.9  7.0 - 25.0 Final    Anion Gap 10/16/2024 12.5  5.0 - 15.0 mmol/L Final    eGFR 10/16/2024 37.2 (L)  >60.0 mL/min/1.73 Final    Magnesium 10/16/2024 2.5 (H)  1.6 - 2.4 mg/dL Final    Phosphorus 10/16/2024 3.4  2.5 - 4.5 mg/dL Final    WBC 10/16/2024 8.09  3.40 - 10.80 10*3/mm3 Final    RBC 10/16/2024 4.67  4.14 - 5.80 10*6/mm3 Final    Hemoglobin 10/16/2024 14.3  13.0 - 17.7 g/dL Final    Hematocrit 10/16/2024 43.1  37.5 - 51.0 % Final    MCV 10/16/2024 92.3  79.0 - 97.0 fL Final    MCH 10/16/2024 30.6  26.6 - 33.0 pg Final    MCHC 10/16/2024 33.2  31.5 - 35.7 g/dL Final    RDW 10/16/2024 14.9  12.3 - 15.4 % Final    RDW-SD 10/16/2024 50.4  37.0 - 54.0 fl Final    MPV 10/16/2024 10.6  6.0 - 12.0 fL Final    Platelets 10/16/2024 105 (L)  140 - 450 10*3/mm3 Final    Neutrophil % 10/16/2024 70.3  42.7 - 76.0 % Final    Lymphocyte % 10/16/2024 15.3 (L)  19.6 - 45.3 % Final    Monocyte % 10/16/2024 8.5  5.0 - 12.0 % Final    Eosinophil % 10/16/2024 3.3  0.3 - 6.2 % Final    Basophil % 10/16/2024 1.2  0.0 - 1.5 % Final    Immature Grans % 10/16/2024 1.4 (H)  0.0 - 0.5 % Final    Neutrophils, Absolute 10/16/2024 5.68  1.70 - 7.00 10*3/mm3 Final    Lymphocytes, Absolute 10/16/2024 1.24  0.70 - 3.10 10*3/mm3 Final    Monocytes, Absolute 10/16/2024 0.69  0.10 - 0.90 10*3/mm3 Final    Eosinophils, Absolute 10/16/2024 0.27  0.00 - 0.40 10*3/mm3 Final    Basophils, Absolute 10/16/2024 0.10  0.00 - 0.20 10*3/mm3 Final    Immature Grans, Absolute  10/16/2024 0.11 (H)  0.00 - 0.05 10*3/mm3 Final    nRBC 10/16/2024 0.0  0.0 - 0.2 /100 WBC Final    PSA 10/16/2024 0.894  0.000 - 4.000 ng/mL Final        No results found.    ASSESSMENT: The patient is a very pleasant 89 y.o. male  with diffuse large B-cell lymphoma      PLAN:    1.  Diffuse large B-cell lymphoma stage Ia:  A.  Scan results from May 20, 2024 while it had few abnormalities there was no evidence of relapsed lymphoma or metastatic prostate cancer.  B.  The patient had 8 mm cystic lesion in the pancreas which we will watch for now.  C.  The patient 6-month follow-up scans has been ordered but not done yet.  I will follow-up on the result.    2.  Prostate cancer:  A.  I will continue the patient on Lupron monthly.  B. I will continue the patient on Xtandi 120 mg daily.  We will consider increasing the dose to full dose of 160 mg daily if he is doing well.  C.  I I discussed with the patient that his PSA has been gradually increasing level of 0.9 on October 16, 2024.  Will consider switching Xtandi to Zytiga if his PSA gets above 2.    3.  Bone metastases:  A.  I will continue the patient on Xgeva 120 mg subcu every 4 weeks.  B.  I will switch him to every 3 months after the first year.  Will be due September 2024.  C.  I will continue the patient on calcium and vitamin D daily.    4.  Right-sided pulmonary embolism:  A. The patient has completed 1 year of treatment.  His repeated CT PE protocol in June 2022 did not reveal any evidence of recurrent PEs.    5.  Hypercholesteremia:  A.  The patient continue pravastatin daily.    6.  Type 2 diabetes:  A.  The patient will continue Mo and Trulicity.    FOLLOW UP: 1 month with Eligard treatment and blood work.    Shannan Ramon MD  11/13/2024

## 2024-11-14 LAB — PSA SERPL-MCNC: 1.37 NG/ML (ref 0–4)

## 2024-11-18 ENCOUNTER — SPECIALTY PHARMACY (OUTPATIENT)
Facility: HOSPITAL | Age: 89
End: 2024-11-18
Payer: MEDICARE

## 2024-11-21 ENCOUNTER — HOSPITAL ENCOUNTER (OUTPATIENT)
Dept: CT IMAGING | Facility: HOSPITAL | Age: 89
Discharge: HOME | End: 2024-11-21
Admitting: INTERNAL MEDICINE
Payer: MEDICARE

## 2024-11-21 DIAGNOSIS — C83.398 DIFFUSE LARGE B-CELL LYMPHOMA OF SOLID ORGAN EXCLUDING SPLEEN: ICD-10-CM

## 2024-11-21 PROCEDURE — 71260 CT THORAX DX C+: CPT

## 2024-11-21 PROCEDURE — 25510000001 IOPAMIDOL 61 % SOLUTION: Performed by: INTERNAL MEDICINE

## 2024-11-21 PROCEDURE — 74177 CT ABD & PELVIS W/CONTRAST: CPT

## 2024-11-21 RX ORDER — IOPAMIDOL 612 MG/ML
100 INJECTION, SOLUTION INTRAVASCULAR
Status: COMPLETED | OUTPATIENT
Start: 2024-11-21 | End: 2024-11-21

## 2024-11-21 RX ADMIN — IOPAMIDOL 95 ML: 612 INJECTION, SOLUTION INTRAVENOUS at 13:32

## 2024-12-13 ENCOUNTER — SPECIALTY PHARMACY (OUTPATIENT)
Dept: ONCOLOGY | Facility: HOSPITAL | Age: 89
End: 2024-12-13
Payer: MEDICARE

## 2024-12-13 NOTE — PROGRESS NOTES
"Specialty Pharmacy Refill Coordination Note     Blane \"CHEO\" is a 89 y.o. male contacted today regarding refills of  enzalutamide 120mg PO QD specialty medication(s).    Reviewed and verified with patient: yes  Specialty medication(s) and dose(s) confirmed: yes    Refill Questions      Flowsheet Row Most Recent Value   Changes to allergies? No   Changes to medications? No   New conditions or infections since last clinic visit No   Unplanned office visit, urgent care, ED, or hospital admission in the last 4 weeks  No   How does patient/caregiver feel medication is working? Good   Financial problems or insurance changes  No   Since the previous refill, were any specialty medication doses or scheduled injections missed or delayed?  No   If yes, please provide the amount n/a   Why were doses missed? n/a   Does this patient require a clinical escalation to a pharmacist? No            Delivery Questions      Flowsheet Row Most Recent Value   Delivery method UPS   Delivery address verified with patient/caregiver? Yes   Delivery address Home   Number of medications in delivery 1   Medication(s) being filled and delivered Enzalutamide (XTANDI)   Doses left of specialty medications 21 tablets   Copay verified? Yes   Copay amount 0$   Copay form of payment No copayment ($0)   Ship Date 12/16   Delivery Date 12/17   Signature Required No                   Follow-up: 30 day(s)     Blas Calle, Pharmacy Technician  Specialty Pharmacy Technician        "

## 2024-12-21 ENCOUNTER — APPOINTMENT (OUTPATIENT)
Dept: CT IMAGING | Facility: HOSPITAL | Age: 89
End: 2024-12-21
Payer: MEDICARE

## 2024-12-21 ENCOUNTER — HOSPITAL ENCOUNTER (OUTPATIENT)
Facility: HOSPITAL | Age: 89
Setting detail: OBSERVATION
Discharge: HOME | End: 2024-12-23
Attending: STUDENT IN AN ORGANIZED HEALTH CARE EDUCATION/TRAINING PROGRAM | Admitting: FAMILY MEDICINE
Payer: MEDICARE

## 2024-12-21 ENCOUNTER — APPOINTMENT (OUTPATIENT)
Dept: GENERAL RADIOLOGY | Facility: HOSPITAL | Age: 89
End: 2024-12-21
Payer: MEDICARE

## 2024-12-21 DIAGNOSIS — I50.9 ACUTE ON CHRONIC CONGESTIVE HEART FAILURE, UNSPECIFIED HEART FAILURE TYPE: Primary | ICD-10-CM

## 2024-12-21 DIAGNOSIS — I49.8 ATRIAL ARRHYTHMIA: ICD-10-CM

## 2024-12-21 PROBLEM — I50.22 CHRONIC HFREF (HEART FAILURE WITH REDUCED EJECTION FRACTION): Status: ACTIVE | Noted: 2024-12-21

## 2024-12-21 PROBLEM — I38 VALVULAR HEART DISEASE: Status: ACTIVE | Noted: 2024-12-21

## 2024-12-21 LAB
ALBUMIN SERPL-MCNC: 4.1 G/DL (ref 3.5–5.2)
ALBUMIN/GLOB SERPL: 1.5 G/DL
ALP SERPL-CCNC: 103 U/L (ref 39–117)
ALT SERPL W P-5'-P-CCNC: 11 U/L (ref 1–41)
ANION GAP SERPL CALCULATED.3IONS-SCNC: 14 MMOL/L (ref 5–15)
AST SERPL-CCNC: 21 U/L (ref 1–40)
BASOPHILS # BLD AUTO: 0.08 10*3/MM3 (ref 0–0.2)
BASOPHILS NFR BLD AUTO: 1.2 % (ref 0–1.5)
BILIRUB SERPL-MCNC: 1.5 MG/DL (ref 0–1.2)
BUN SERPL-MCNC: 30 MG/DL (ref 8–23)
BUN/CREAT SERPL: 20.5 (ref 7–25)
CALCIUM SPEC-SCNC: 8.8 MG/DL (ref 8.6–10.5)
CHLORIDE SERPL-SCNC: 101 MMOL/L (ref 98–107)
CO2 SERPL-SCNC: 23 MMOL/L (ref 22–29)
CREAT SERPL-MCNC: 1.46 MG/DL (ref 0.76–1.27)
CRP SERPL-MCNC: <0.3 MG/DL (ref 0–0.5)
DEPRECATED RDW RBC AUTO: 53.9 FL (ref 37–54)
EGFRCR SERPLBLD CKD-EPI 2021: 46.3 ML/MIN/1.73
EOSINOPHIL # BLD AUTO: 0.25 10*3/MM3 (ref 0–0.4)
EOSINOPHIL NFR BLD AUTO: 3.9 % (ref 0.3–6.2)
ERYTHROCYTE [DISTWIDTH] IN BLOOD BY AUTOMATED COUNT: 15.7 % (ref 12.3–15.4)
FLUAV RNA RESP QL NAA+PROBE: NOT DETECTED
FLUBV RNA RESP QL NAA+PROBE: NOT DETECTED
GEN 5 1HR TROPONIN T REFLEX: 31 NG/L
GLOBULIN UR ELPH-MCNC: 2.8 GM/DL
GLUCOSE SERPL-MCNC: 232 MG/DL (ref 65–99)
HCT VFR BLD AUTO: 43.3 % (ref 37.5–51)
HGB BLD-MCNC: 14.1 G/DL (ref 13–17.7)
IMM GRANULOCYTES # BLD AUTO: 0.09 10*3/MM3 (ref 0–0.05)
IMM GRANULOCYTES NFR BLD AUTO: 1.4 % (ref 0–0.5)
LYMPHOCYTES # BLD AUTO: 1.24 10*3/MM3 (ref 0.7–3.1)
LYMPHOCYTES NFR BLD AUTO: 19.1 % (ref 19.6–45.3)
MAGNESIUM SERPL-MCNC: 2.2 MG/DL (ref 1.6–2.4)
MCH RBC QN AUTO: 30.7 PG (ref 26.6–33)
MCHC RBC AUTO-ENTMCNC: 32.6 G/DL (ref 31.5–35.7)
MCV RBC AUTO: 94.1 FL (ref 79–97)
MONOCYTES # BLD AUTO: 0.57 10*3/MM3 (ref 0.1–0.9)
MONOCYTES NFR BLD AUTO: 8.8 % (ref 5–12)
NEUTROPHILS NFR BLD AUTO: 4.26 10*3/MM3 (ref 1.7–7)
NEUTROPHILS NFR BLD AUTO: 65.6 % (ref 42.7–76)
NRBC BLD AUTO-RTO: 0 /100 WBC (ref 0–0.2)
NT-PROBNP SERPL-MCNC: 8775 PG/ML (ref 0–1800)
PLATELET # BLD AUTO: 108 10*3/MM3 (ref 140–450)
PMV BLD AUTO: 10.7 FL (ref 6–12)
POTASSIUM SERPL-SCNC: 4.1 MMOL/L (ref 3.5–5.2)
PROCALCITONIN SERPL-MCNC: 0.08 NG/ML (ref 0–0.25)
PROT SERPL-MCNC: 6.9 G/DL (ref 6–8.5)
RBC # BLD AUTO: 4.6 10*6/MM3 (ref 4.14–5.8)
SARS-COV-2 RNA RESP QL NAA+PROBE: NOT DETECTED
SODIUM SERPL-SCNC: 138 MMOL/L (ref 136–145)
T4 FREE SERPL-MCNC: 1.61 NG/DL (ref 0.92–1.68)
TROPONIN T % DELTA: -16 %
TROPONIN T NUMERIC DELTA: -6 NG/L
TROPONIN T SERPL HS-MCNC: 37 NG/L
TSH SERPL DL<=0.05 MIU/L-ACNC: 2.09 UIU/ML (ref 0.27–4.2)
WBC NRBC COR # BLD AUTO: 6.49 10*3/MM3 (ref 3.4–10.8)

## 2024-12-21 PROCEDURE — 25010000002 FUROSEMIDE PER 20 MG: Performed by: STUDENT IN AN ORGANIZED HEALTH CARE EDUCATION/TRAINING PROGRAM

## 2024-12-21 PROCEDURE — 80053 COMPREHEN METABOLIC PANEL: CPT | Performed by: STUDENT IN AN ORGANIZED HEALTH CARE EDUCATION/TRAINING PROGRAM

## 2024-12-21 PROCEDURE — 25510000001 IOPAMIDOL 61 % SOLUTION: Performed by: STUDENT IN AN ORGANIZED HEALTH CARE EDUCATION/TRAINING PROGRAM

## 2024-12-21 PROCEDURE — 63710000001 INSULIN GLARGINE PER 5 UNITS: Performed by: INTERNAL MEDICINE

## 2024-12-21 PROCEDURE — 85025 COMPLETE CBC W/AUTO DIFF WBC: CPT | Performed by: STUDENT IN AN ORGANIZED HEALTH CARE EDUCATION/TRAINING PROGRAM

## 2024-12-21 PROCEDURE — G0378 HOSPITAL OBSERVATION PER HR: HCPCS

## 2024-12-21 PROCEDURE — 96374 THER/PROPH/DIAG INJ IV PUSH: CPT

## 2024-12-21 PROCEDURE — 84439 ASSAY OF FREE THYROXINE: CPT | Performed by: INTERNAL MEDICINE

## 2024-12-21 PROCEDURE — 99291 CRITICAL CARE FIRST HOUR: CPT | Performed by: STUDENT IN AN ORGANIZED HEALTH CARE EDUCATION/TRAINING PROGRAM

## 2024-12-21 PROCEDURE — 99285 EMERGENCY DEPT VISIT HI MDM: CPT

## 2024-12-21 PROCEDURE — 93005 ELECTROCARDIOGRAM TRACING: CPT | Performed by: STUDENT IN AN ORGANIZED HEALTH CARE EDUCATION/TRAINING PROGRAM

## 2024-12-21 PROCEDURE — 63710000001 INSULIN LISPRO (HUMAN) PER 5 UNITS: Performed by: INTERNAL MEDICINE

## 2024-12-21 PROCEDURE — 86140 C-REACTIVE PROTEIN: CPT | Performed by: STUDENT IN AN ORGANIZED HEALTH CARE EDUCATION/TRAINING PROGRAM

## 2024-12-21 PROCEDURE — 71275 CT ANGIOGRAPHY CHEST: CPT

## 2024-12-21 PROCEDURE — 84484 ASSAY OF TROPONIN QUANT: CPT | Performed by: STUDENT IN AN ORGANIZED HEALTH CARE EDUCATION/TRAINING PROGRAM

## 2024-12-21 PROCEDURE — 83735 ASSAY OF MAGNESIUM: CPT | Performed by: STUDENT IN AN ORGANIZED HEALTH CARE EDUCATION/TRAINING PROGRAM

## 2024-12-21 PROCEDURE — 71045 X-RAY EXAM CHEST 1 VIEW: CPT

## 2024-12-21 PROCEDURE — 25010000002 ENOXAPARIN PER 10 MG: Performed by: INTERNAL MEDICINE

## 2024-12-21 PROCEDURE — 99223 1ST HOSP IP/OBS HIGH 75: CPT | Performed by: INTERNAL MEDICINE

## 2024-12-21 PROCEDURE — 84145 PROCALCITONIN (PCT): CPT | Performed by: STUDENT IN AN ORGANIZED HEALTH CARE EDUCATION/TRAINING PROGRAM

## 2024-12-21 PROCEDURE — 84443 ASSAY THYROID STIM HORMONE: CPT | Performed by: INTERNAL MEDICINE

## 2024-12-21 PROCEDURE — 96372 THER/PROPH/DIAG INJ SC/IM: CPT

## 2024-12-21 PROCEDURE — 82948 REAGENT STRIP/BLOOD GLUCOSE: CPT

## 2024-12-21 PROCEDURE — 87636 SARSCOV2 & INF A&B AMP PRB: CPT | Performed by: STUDENT IN AN ORGANIZED HEALTH CARE EDUCATION/TRAINING PROGRAM

## 2024-12-21 PROCEDURE — 83880 ASSAY OF NATRIURETIC PEPTIDE: CPT | Performed by: STUDENT IN AN ORGANIZED HEALTH CARE EDUCATION/TRAINING PROGRAM

## 2024-12-21 RX ORDER — METOPROLOL TARTRATE 1 MG/ML
5 INJECTION, SOLUTION INTRAVENOUS EVERY 4 HOURS PRN
Status: DISCONTINUED | OUTPATIENT
Start: 2024-12-21 | End: 2024-12-23 | Stop reason: HOSPADM

## 2024-12-21 RX ORDER — ASCORBIC ACID 500 MG
1000 TABLET ORAL DAILY
Status: DISCONTINUED | OUTPATIENT
Start: 2024-12-22 | End: 2024-12-23 | Stop reason: HOSPADM

## 2024-12-21 RX ORDER — ONDANSETRON 2 MG/ML
4 INJECTION INTRAMUSCULAR; INTRAVENOUS EVERY 6 HOURS PRN
Status: DISCONTINUED | OUTPATIENT
Start: 2024-12-21 | End: 2024-12-23 | Stop reason: HOSPADM

## 2024-12-21 RX ORDER — INSULIN LISPRO 100 [IU]/ML
1-200 INJECTION, SOLUTION INTRAVENOUS; SUBCUTANEOUS AS NEEDED
Status: DISCONTINUED | OUTPATIENT
Start: 2024-12-21 | End: 2024-12-23 | Stop reason: HOSPADM

## 2024-12-21 RX ORDER — BISACODYL 10 MG
10 SUPPOSITORY, RECTAL RECTAL DAILY PRN
Status: DISCONTINUED | OUTPATIENT
Start: 2024-12-21 | End: 2024-12-23 | Stop reason: HOSPADM

## 2024-12-21 RX ORDER — METOPROLOL SUCCINATE 25 MG/1
12.5 TABLET, EXTENDED RELEASE ORAL DAILY
Status: DISCONTINUED | OUTPATIENT
Start: 2024-12-22 | End: 2024-12-23 | Stop reason: HOSPADM

## 2024-12-21 RX ORDER — CALCIUM CARBONATE 500 MG/1
2 TABLET, CHEWABLE ORAL 2 TIMES DAILY PRN
Status: DISCONTINUED | OUTPATIENT
Start: 2024-12-21 | End: 2024-12-23 | Stop reason: HOSPADM

## 2024-12-21 RX ORDER — INSULIN LISPRO 100 [IU]/ML
1-200 INJECTION, SOLUTION INTRAVENOUS; SUBCUTANEOUS
Status: DISCONTINUED | OUTPATIENT
Start: 2024-12-21 | End: 2024-12-23 | Stop reason: HOSPADM

## 2024-12-21 RX ORDER — SODIUM CHLORIDE 9 MG/ML
40 INJECTION, SOLUTION INTRAVENOUS AS NEEDED
Status: DISCONTINUED | OUTPATIENT
Start: 2024-12-21 | End: 2024-12-23 | Stop reason: HOSPADM

## 2024-12-21 RX ORDER — SODIUM CHLORIDE 0.9 % (FLUSH) 0.9 %
10 SYRINGE (ML) INJECTION EVERY 12 HOURS SCHEDULED
Status: DISCONTINUED | OUTPATIENT
Start: 2024-12-21 | End: 2024-12-23 | Stop reason: HOSPADM

## 2024-12-21 RX ORDER — DEXTROSE MONOHYDRATE 25 G/50ML
10-50 INJECTION, SOLUTION INTRAVENOUS
Status: DISCONTINUED | OUTPATIENT
Start: 2024-12-21 | End: 2024-12-23 | Stop reason: HOSPADM

## 2024-12-21 RX ORDER — FUROSEMIDE 20 MG/1
20 TABLET ORAL DAILY
Qty: 5 TABLET | Refills: 0 | Status: SHIPPED | OUTPATIENT
Start: 2024-12-21 | End: 2024-12-23

## 2024-12-21 RX ORDER — ONDANSETRON 4 MG/1
4 TABLET, ORALLY DISINTEGRATING ORAL EVERY 6 HOURS PRN
Status: DISCONTINUED | OUTPATIENT
Start: 2024-12-21 | End: 2024-12-23 | Stop reason: HOSPADM

## 2024-12-21 RX ORDER — AMIODARONE HYDROCHLORIDE 200 MG/1
200 TABLET ORAL DAILY
Status: DISCONTINUED | OUTPATIENT
Start: 2024-12-22 | End: 2024-12-23 | Stop reason: HOSPADM

## 2024-12-21 RX ORDER — IBUPROFEN 600 MG/1
1 TABLET ORAL
Status: DISCONTINUED | OUTPATIENT
Start: 2024-12-21 | End: 2024-12-23 | Stop reason: HOSPADM

## 2024-12-21 RX ORDER — PRAVASTATIN SODIUM 20 MG
40 TABLET ORAL DAILY
Status: DISCONTINUED | OUTPATIENT
Start: 2024-12-22 | End: 2024-12-23 | Stop reason: HOSPADM

## 2024-12-21 RX ORDER — POLYETHYLENE GLYCOL 3350 17 G/17G
17 POWDER, FOR SOLUTION ORAL DAILY PRN
Status: DISCONTINUED | OUTPATIENT
Start: 2024-12-21 | End: 2024-12-23 | Stop reason: HOSPADM

## 2024-12-21 RX ORDER — NITROGLYCERIN 0.4 MG/1
0.4 TABLET SUBLINGUAL
Status: DISCONTINUED | OUTPATIENT
Start: 2024-12-21 | End: 2024-12-23 | Stop reason: HOSPADM

## 2024-12-21 RX ORDER — IOPAMIDOL 612 MG/ML
85 INJECTION, SOLUTION INTRAVASCULAR
Status: COMPLETED | OUTPATIENT
Start: 2024-12-21 | End: 2024-12-21

## 2024-12-21 RX ORDER — MULTIPLE VITAMINS W/ MINERALS TAB 9MG-400MCG
1 TAB ORAL DAILY
Status: DISCONTINUED | OUTPATIENT
Start: 2024-12-22 | End: 2024-12-23 | Stop reason: HOSPADM

## 2024-12-21 RX ORDER — FUROSEMIDE 10 MG/ML
80 INJECTION INTRAMUSCULAR; INTRAVENOUS ONCE
Status: COMPLETED | OUTPATIENT
Start: 2024-12-21 | End: 2024-12-21

## 2024-12-21 RX ORDER — METOPROLOL TARTRATE 25 MG/1
25 TABLET, FILM COATED ORAL ONCE
Status: COMPLETED | OUTPATIENT
Start: 2024-12-21 | End: 2024-12-21

## 2024-12-21 RX ORDER — AMOXICILLIN 250 MG
2 CAPSULE ORAL 2 TIMES DAILY PRN
Status: DISCONTINUED | OUTPATIENT
Start: 2024-12-21 | End: 2024-12-23 | Stop reason: HOSPADM

## 2024-12-21 RX ORDER — FUROSEMIDE 10 MG/ML
80 INJECTION INTRAMUSCULAR; INTRAVENOUS 2 TIMES DAILY
Status: DISCONTINUED | OUTPATIENT
Start: 2024-12-22 | End: 2024-12-22

## 2024-12-21 RX ORDER — NICOTINE POLACRILEX 4 MG
15 LOZENGE BUCCAL
Status: DISCONTINUED | OUTPATIENT
Start: 2024-12-21 | End: 2024-12-23 | Stop reason: HOSPADM

## 2024-12-21 RX ORDER — CALCIUM CARBONATE/VITAMIN D3 500MG-5MCG
1 TABLET ORAL EVERY OTHER DAY
Status: DISCONTINUED | OUTPATIENT
Start: 2024-12-21 | End: 2024-12-21

## 2024-12-21 RX ORDER — SODIUM CHLORIDE 0.9 % (FLUSH) 0.9 %
10 SYRINGE (ML) INJECTION AS NEEDED
Status: DISCONTINUED | OUTPATIENT
Start: 2024-12-21 | End: 2024-12-23 | Stop reason: HOSPADM

## 2024-12-21 RX ORDER — ENOXAPARIN SODIUM 100 MG/ML
40 INJECTION SUBCUTANEOUS NIGHTLY
Status: DISCONTINUED | OUTPATIENT
Start: 2024-12-21 | End: 2024-12-21 | Stop reason: ALTCHOICE

## 2024-12-21 RX ORDER — BISACODYL 5 MG/1
5 TABLET, DELAYED RELEASE ORAL DAILY PRN
Status: DISCONTINUED | OUTPATIENT
Start: 2024-12-21 | End: 2024-12-23 | Stop reason: HOSPADM

## 2024-12-21 RX ORDER — ENOXAPARIN SODIUM 100 MG/ML
1 INJECTION SUBCUTANEOUS EVERY 12 HOURS
Status: DISCONTINUED | OUTPATIENT
Start: 2024-12-21 | End: 2024-12-22

## 2024-12-21 RX ORDER — FLUTICASONE PROPIONATE 50 MCG
2 SPRAY, SUSPENSION (ML) NASAL DAILY PRN
Status: DISCONTINUED | OUTPATIENT
Start: 2024-12-21 | End: 2024-12-23 | Stop reason: HOSPADM

## 2024-12-21 RX ORDER — ACETAMINOPHEN 325 MG/1
650 TABLET ORAL EVERY 4 HOURS PRN
Status: DISCONTINUED | OUTPATIENT
Start: 2024-12-21 | End: 2024-12-23 | Stop reason: HOSPADM

## 2024-12-21 RX ORDER — ALLOPURINOL 100 MG/1
100 TABLET ORAL DAILY
Status: DISCONTINUED | OUTPATIENT
Start: 2024-12-22 | End: 2024-12-23 | Stop reason: HOSPADM

## 2024-12-21 RX ADMIN — INSULIN GLARGINE 12 UNITS: 100 INJECTION, SOLUTION SUBCUTANEOUS at 23:23

## 2024-12-21 RX ADMIN — Medication 10 ML: at 23:24

## 2024-12-21 RX ADMIN — FUROSEMIDE 80 MG: 10 INJECTION, SOLUTION INTRAMUSCULAR; INTRAVENOUS at 20:00

## 2024-12-21 RX ADMIN — SACUBITRIL AND VALSARTAN 1 TABLET: 24; 26 TABLET, FILM COATED ORAL at 23:24

## 2024-12-21 RX ADMIN — IOPAMIDOL 85 ML: 612 INJECTION, SOLUTION INTRAVENOUS at 19:42

## 2024-12-21 RX ADMIN — METOPROLOL TARTRATE 25 MG: 25 TABLET, FILM COATED ORAL at 21:39

## 2024-12-21 RX ADMIN — ENOXAPARIN SODIUM 90 MG: 100 INJECTION SUBCUTANEOUS at 23:24

## 2024-12-21 RX ADMIN — INSULIN LISPRO 2 UNITS: 100 INJECTION, SOLUTION INTRAVENOUS; SUBCUTANEOUS at 23:23

## 2024-12-21 NOTE — Clinical Note
[facility] EMERGENCY DEPARTMENT  [address] [address]  Phone: [phone]    Blane Dietz was seen and treated in our emergency department on 12/21/2024.  He may return to work on 12/25/2024.         Thank you for choosing [facility].    Franko Ornelas MD       Good afternoon! I just saw Ms. Bello for routine follow-up. I see that you just saw her for follow-up last week. She was concerned because she still had blood in her urine at that visit (which I see in your note) but didn't understand why. We reviewed that the w/u for RCC and her cystoscopy was all normal. Do you have an idea why she has persistent microscopic hematuria? Ruthie

## 2024-12-21 NOTE — ED PROVIDER NOTES
Subjective:  History of Present Illness:    Patient is an 89-year-old male with history of aortic stenosis, status post valve replacement, CHF, diabetes mellitus, gout, hypertension, hyperlipidemia, history of nephrectomy, history of prostatectomy secondary to malignancy, B-cell lymphoma with bony metastasis, sleep apnea who presents today with shortness of breath.  Increased shortness of breath over the last 24 hours.  He denies any fevers at home.  No chest pain.  Denies any abdominal pain nausea vomiting or diarrhea.  Has bilateral leg swelling denies any unilateral pain.  He does report that he has been off anticoagulation.      Nurses Notes reviewed and agree, including vitals, allergies, social history and prior medical history.     REVIEW OF SYSTEMS: All systems reviewed and not pertinent unless noted.  Review of Systems   Constitutional:  Positive for activity change and fatigue. Negative for appetite change, chills and fever.   HENT:  Negative for congestion, sinus pressure, sneezing and trouble swallowing.    Eyes:  Negative for discharge and itching.   Respiratory:  Positive for cough and shortness of breath. Negative for wheezing.    Cardiovascular:  Negative for chest pain and palpitations.   Gastrointestinal:  Negative for abdominal distention, abdominal pain, diarrhea, nausea and vomiting.   Endocrine: Negative for cold intolerance and heat intolerance.   Genitourinary:  Negative for decreased urine volume, dysuria and urgency.   Musculoskeletal:  Negative for gait problem, neck pain and neck stiffness.   Skin:  Negative for color change and rash.   Allergic/Immunologic: Negative for immunocompromised state.   Neurological:  Negative for facial asymmetry and headaches.   Hematological:  Negative for adenopathy.   Psychiatric/Behavioral:  Negative for self-injury and suicidal ideas.        Past Medical History:   Diagnosis Date    Aortic stenosis     status post ROSS procedure, remote, with December  2015 echocardiography revealing normal aortic and pulmonary valve function, normal ejection fraction and mild to moderate MR    Arthritis     Bilateral impacted cerumen 11/23/2016    Bronchitis     CHF (congestive heart failure)     Chronic gout of right foot 06/13/2016    Impression: 03/08/2016 - stable.;     COVID-19 vaccine series completed 05/12/2021    [place] 2nd dose 3/12/21.    Depression     Diabetes mellitus     Diverticulitis     Exogenous obesity     Gout     Heart murmur     History of vitamin D deficiency     Hypercholesteremia 08/11/2016    Hyperlipidemia     Hypertension     Kidney lesion     Malignant neoplasm of prostate     Morbid obesity 08/11/2016    Pneumonia 05/12/2021    Primary osteoarthritis involving multiple joints 06/13/2016    Impression: 03/08/2016 - stable;     Pulmonary embolism     Sleep apnea 05/12/2021    C-Pap    Uncontrolled type 2 diabetes mellitus with hyperglycemia 06/13/2016    Impression: 03/08/2016 - seeing Endo .;     Vertigo        Allergies:    Ampicillin, Medrol [methylprednisolone], Myrbetriq [mirabegron], Penicillins, Bee venom, and Melatonin      Past Surgical History:   Procedure Laterality Date    CARDIAC VALVE REPLACEMENT  05/12/2021    Ross procedure 22 years ago    COLON SURGERY      COLONOSCOPY      COLOSTOMY  1999    COLOSTOMY REVISION      CYSTOSCOPY N/A 12/7/2021    Procedure: CYSTOSCOPY FLEXIBLE;  Surgeon: José Miguel Villafuerte Jr., MD;  Location:  [facility];  Service: Urology;  Laterality: N/A;    EXPLORATORY LAPAROTOMY      With Tissue Removal    LIPOMA EXCISION      MOHS SURGERY      NEPHROURETERECTOMY Right 12/7/2021    Procedure: RIGHT NEPHROURETERECTOMY LAPAROSCOPIC WITH DAVINCI ROBOT;  Surgeon: José Miguel Villafuerte Jr., MD;  Location:  [facility];  Service: Robotics - DaVinci;  Laterality: Right;    OTHER SURGICAL HISTORY      Porcine Valve    PROSTATE SURGERY      PROSTATECTOMY  2000     History of Prostatectomy Perineal Radical    SKIN CANCER  EXCISION      from head and lip    TONSILLECTOMY           Social History     Socioeconomic History    Marital status: Single   Tobacco Use    Smoking status: Never     Passive exposure: Never    Smokeless tobacco: Never   Vaping Use    Vaping status: Never Used   Substance and Sexual Activity    Alcohol use: No    Drug use: No    Sexual activity: Not Currently         Family History   Problem Relation Age of Onset    Diabetes Mother     Lung cancer Mother     Cancer Mother     Heart disease Father     Breast cancer Other     Cancer Other     Hypertension Other     Migraines Other     Obesity Other     Diabetes Other        Objective  Physical Exam:  /96   Pulse 109   Temp 97.7 °F (36.5 °C) (Oral) Comment: Simultaneous filing. User may be unaware of other data. Comment (Src): Simultaneous filing. User may be unaware of other data.  Resp 18   SpO2 93%      Physical Exam  Constitutional:       General: He is not in acute distress.     Appearance: Normal appearance. He is obese. He is not ill-appearing.   HENT:      Head: Normocephalic and atraumatic.      Nose: Nose normal. No congestion or rhinorrhea.      Mouth/Throat:      Mouth: Mucous membranes are moist.      Pharynx: Oropharynx is clear.   Eyes:      Extraocular Movements: Extraocular movements intact.      Conjunctiva/sclera: Conjunctivae normal.      Pupils: Pupils are equal, round, and reactive to light.   Cardiovascular:      Rate and Rhythm: Regular rhythm. Tachycardia present.      Pulses: Normal pulses.   Pulmonary:      Effort: Pulmonary effort is normal. No respiratory distress.      Breath sounds: Normal breath sounds. No wheezing or rhonchi.   Abdominal:      General: Abdomen is flat. Bowel sounds are normal. There is no distension.      Palpations: Abdomen is soft.      Tenderness: There is no abdominal tenderness.   Musculoskeletal:         General: No swelling or tenderness. Normal range of motion.      Cervical back: Normal range of  motion and neck supple. No rigidity or tenderness.      Right lower leg: Edema present.      Left lower leg: Edema present.   Skin:     General: Skin is warm and dry.      Capillary Refill: Capillary refill takes less than 2 seconds.   Neurological:      General: No focal deficit present.      Mental Status: He is alert and oriented to person, place, and time. Mental status is at baseline.      Cranial Nerves: No cranial nerve deficit.      Sensory: No sensory deficit.      Motor: No weakness.   Psychiatric:         Mood and Affect: Mood normal.         Behavior: Behavior normal.         Thought Content: Thought content normal.         Judgment: Judgment normal.         Critical Care    Performed by: Franko Ornelas MD  Authorized by: Franko Ornelas MD    Critical care provider statement:     Critical care time (minutes):  30    Critical care was necessary to treat or prevent imminent or life-threatening deterioration of the following conditions:  Respiratory failure and cardiac failure (A-fib with RVR)    Critical care was time spent personally by me on the following activities:  Blood draw for specimens, development of treatment plan with patient or surrogate, evaluation of patient's response to treatment, discussions with primary provider, examination of patient, obtaining history from patient or surrogate, ordering and performing treatments and interventions, ordering and review of laboratory studies, ordering and review of radiographic studies, pulse oximetry, re-evaluation of patient's condition and review of old charts    Care discussed with: admitting provider        ED Course:    ED Course as of 12/21/24 2219   Sat Dec 21, 2024   1904 EKG interpreted by me, sinus tachycardia with right bundle branch block, no concerning ST changes noted, PVC noted in the lead, rate of 104, abnormal EKG [JE]   1931 No acute process per my depend interpretation of chest x-ray prior to radiology overread [JE]       ED Course User Index  [JE] Franko Ornelas MD       Lab Results (last 24 hours)       Procedure Component Value Units Date/Time    CBC & Differential [[id number]]  (Abnormal) Collected: 12/21/24 1855    Specimen: Blood Updated: 12/21/24 1906    Narrative:      The following orders were created for panel order CBC & Differential.  Procedure                               Abnormality         Status                     ---------                               -----------         ------                     CBC Auto Differential[[id number]]        Abnormal            Final result               Scan Slide[[id number]]                                                                    Please view results for these tests on the individual orders.    Comprehensive Metabolic Panel [[id number]]  (Abnormal) Collected: 12/21/24 1855    Specimen: Blood Updated: 12/21/24 1923     Glucose 232 mg/dL      Comment: Glucose >180, Hemoglobin A1C recommended.        BUN 30 mg/dL      Creatinine 1.46 mg/dL      Sodium 138 mmol/L      Potassium 4.1 mmol/L      Chloride 101 mmol/L      CO2 23.0 mmol/L      Calcium 8.8 mg/dL      Total Protein 6.9 g/dL      Albumin 4.1 g/dL      ALT (SGPT) 11 U/L      AST (SGOT) 21 U/L      Alkaline Phosphatase 103 U/L      Total Bilirubin 1.5 mg/dL      Globulin 2.8 gm/dL      A/G Ratio 1.5 g/dL      BUN/Creatinine Ratio 20.5     Anion Gap 14.0 mmol/L      eGFR 46.3 mL/min/1.73     Narrative:      GFR Categories in Chronic Kidney Disease (CKD)      GFR Category          GFR (mL/min/1.73)    Interpretation  G1                     90 or greater         Normal or high (1)  G2                      60-89                Mild decrease (1)  G3a                   45-59                Mild to moderate decrease  G3b                   30-44                Moderate to severe decrease  G4                    15-29                Severe decrease  G5                    14 or less           Kidney failure          (1)In the  absence of evidence of kidney disease, neither GFR category G1 or G2 fulfill the criteria for CKD.    eGFR calculation 2021 CKD-EPI creatinine equation, which does not include race as a factor    High Sensitivity Troponin T [[id number]]  (Abnormal) Collected: 12/21/24 1855    Specimen: Blood Updated: 12/21/24 1925     HS Troponin T 37 ng/L     Narrative:      High Sensitive Troponin T Reference Range:  <14.0 ng/L- Negative Female for AMI  <22.0 ng/L- Negative Male for AMI  >=14 - Abnormal Female indicating possible myocardial injury.  >=22 - Abnormal Male indicating possible myocardial injury.   Clinicians would have to utilize clinical acumen, EKG, Troponin, and serial changes to determine if it is an Acute Myocardial Infarction or myocardial injury due to an underlying chronic condition.         BNP [[id number]]  (Abnormal) Collected: 12/21/24 1855    Specimen: Blood Updated: 12/21/24 1950     proBNP 8,775.0 pg/mL     Narrative:      This assay is used as an aid in the diagnosis of individuals suspected of having heart failure. It can be used as an aid in the diagnosis of acute decompensated heart failure (ADHF) in patients presenting with signs and symptoms of ADHF to the emergency department (ED). In addition, NT-proBNP of <300 pg/mL indicates ADHF is not likely.    Age Range Result Interpretation  NT-proBNP Concentration (pg/mL:      <50             Positive            >450                   Gray                 300-450                    Negative             <300    50-75           Positive            >900                  Gray                300-900                  Negative            <300      >75             Positive            >1800                  Gray                300-1800                  Negative            <300    C-reactive Protein [[id number]]  (Normal) Collected: 12/21/24 1855    Specimen: Blood Updated: 12/21/24 1925     C-Reactive Protein <0.30 mg/dL     Procalcitonin [[id number]]  (Normal)  "Collected: 12/21/24 1855    Specimen: Blood Updated: 12/21/24 1928     Procalcitonin 0.08 ng/mL     Narrative:      As a Marker for Sepsis (Non-Neonates):    1. <0.5 ng/mL represents a low risk of severe sepsis and/or septic shock.  2. >2 ng/mL represents a high risk of severe sepsis and/or septic shock.    As a Marker for Lower Respiratory Tract Infections that require antibiotic therapy:    PCT on Admission    Antibiotic Therapy       6-12 Hrs later    >0.5                Strongly Recommended  >0.25 - <0.5        Recommended   0.1 - 0.25          Discouraged              Remeasure/reassess PCT  <0.1                Strongly Discouraged     Remeasure/reassess PCT    As 28 day mortality risk marker: \"Change in Procalcitonin Result\" (>80% or <=80%) if Day 0 (or Day 1) and Day 4 values are available. Refer to http://[email or web address] [place]-pct-[email or web address]    Change in PCT <=80%  A decrease of PCT levels below or equal to 80% defines a positive change in PCT test result representing a higher risk for 28-day all-cause mortality of patients diagnosed with severe sepsis for septic shock.    Change in PCT >80%  A decrease of PCT levels of more than 80% defines a negative change in PCT result representing a lower risk for 28-day all-cause mortality of patients diagnosed with severe sepsis or septic shock.       Magnesium [[id number]]  (Normal) Collected: 12/21/24 1855    Specimen: Blood Updated: 12/21/24 1923     Magnesium 2.2 mg/dL     CBC Auto Differential [[id number]]  (Abnormal) Collected: 12/21/24 1855    Specimen: Blood Updated: 12/21/24 1906     WBC 6.49 10*3/mm3      RBC 4.60 10*6/mm3      Hemoglobin 14.1 g/dL      Hematocrit 43.3 %      MCV 94.1 fL      MCH 30.7 pg      MCHC 32.6 g/dL      RDW 15.7 %      RDW-SD 53.9 fl      MPV 10.7 fL      Platelets 108 10*3/mm3      Neutrophil % 65.6 %      Lymphocyte % 19.1 %      Monocyte % 8.8 %      Eosinophil % 3.9 %      Basophil % 1.2 %      Immature Grans % 1.4 %      Neutrophils, " Absolute 4.26 10*3/mm3      Lymphocytes, Absolute 1.24 10*3/mm3      Monocytes, Absolute 0.57 10*3/mm3      Eosinophils, Absolute 0.25 10*3/mm3      Basophils, Absolute 0.08 10*3/mm3      Immature Grans, Absolute 0.09 10*3/mm3      nRBC 0.0 /100 WBC     COVID-19 and FLU A/B PCR, 1 HR TAT - Swab, Nasopharynx [[id number]]  (Normal) Collected: 12/21/24 1912    Specimen: Swab from Nasopharynx Updated: 12/1937     COVID19 Not Detected     Influenza A PCR Not Detected     Influenza B PCR Not Detected    Narrative:      Fact sheet for providers: https://www.fda.gov/media/528106/download    Fact sheet for patients: https://www.fda.gov/media/004142/download    Test performed by PCR.    High Sensitivity Troponin T 1Hr [[id number]]  (Abnormal) Collected: 12/21/24 2000    Specimen: Blood Updated: 12/21/24 2029     HS Troponin T 31 ng/L      Troponin T Numeric Delta -6 ng/L      Troponin T % Delta -16 %     Narrative:      High Sensitive Troponin T Reference Range:  <14.0 ng/L- Negative Female for AMI  <22.0 ng/L- Negative Male for AMI  >=14 - Abnormal Female indicating possible myocardial injury.  >=22 - Abnormal Male indicating possible myocardial injury.   Clinicians would have to utilize clinical acumen, EKG, Troponin, and serial changes to determine if it is an Acute Myocardial Infarction or myocardial injury due to an underlying chronic condition.         TSH [[id number]]  (Normal) Collected: 12/21/24 2000    Specimen: Blood Updated: 12/21/24 2207     TSH 2.090 uIU/mL     T4, Free [[id number]]  (Normal) Collected: 12/21/24 2000    Specimen: Blood Updated: 12/21/24 2207     Free T4 1.61 ng/dL              CT Angiogram Chest Pulmonary Embolism    Result Date: 12/21/2024  FINAL REPORT TECHNIQUE: null CLINICAL HISTORY: Shortness of breath x 1 week, recent respiratory illness 2 months ago, history of PE, eval PE COMPARISON: null FINDINGS: Exam: CTA Chest with IV contrast. Procedure: Contrast was administered. Coronal and  sagittal MIP reformats were performed Comparison: 11/21/2024 Clinical history: Shortness of breath x1 week. Recent respiratory illness 2 months ago. History of PE. Eval PE Findings: Evaluation for PE in the subsegmental arteries of the lower lobes bilaterally is limited due to suboptimal opacification. No pulmonary emboli identified elsewhere in the chest. Prior median sternotomy and CABG. Ascending thoracic aortic aneurysm measuring 4.4 cm is stable compared to 11/21/2024 No thoracic aortic aneurysm. No hilar or mediastinal adenopathy. Cardiomegaly No pericardial fluid collection. No pneumothorax. Small bilateral pleural fluid collections are similar to prior exam. Bilateral lower lobe consolidation may represent atelectasis or pneumonia. Nodular cirrhotic appearing liver, unchanged . Visualized liver, spleen and pancreas do not demonstrate any acute process. No gallstones. No thoracic spine compression fractures     Impression: Impression: 1. Evaluation for PE in the subsegmental arteries of the lower lobes bilaterally is limited due to suboptimal opacification. No pulmonary emboli identified elsewhere in the chest. 2. Ascending thoracic aortic aneurysm is unchanged in size compared to prior exam. No contrast within the aorta. 3. Stable small bilateral pleural fluid collections. Bilateral lower lobe consolidation may represent pneumonia or atelectasis. 4. Nodular cirrhotic appearing liver, unchanged. Authenticated and Electronically Signed by Leatha Velasco MD on 12/21/2024 08:08:28 PM        MDM      Initial impression of presenting illness: Shortness of breath    DDX: includes but is not limited to: Bacterial pneumonia, viral URI, PE, CHF exacerbation    Patient arrives stable with vitals interpreted by myself.     Pertinent features from physical exam: Clear to auscultation, tachycardic with regular rate, no murmur, nontender to abdominal palpation, 2+ pitting edema bilateral.    Initial diagnostic plan: CBC,  CMP, troponin, BNP, EKG, chest x-ray, CRP, Pro-Yosvany, magnesium, CT PE    Results from initial plan were reviewed and interpreted by me revealing no concern for PE, no concern for bacterial illness, suspect CHF exacerbation is etiology of patient's symptoms    Diagnostic information from other sources: Discussed with EMS on arrival and reviewed past medical records    Interventions / Re-evaluation: Given Lasix due to concerns for fluid overload.  On reevaluation patient was able to ambulate through the emergency department without desaturation.  However, shortly thereafter, patient had episode of A-fib with RVR with rates in the 200s.  Remained stable.  Was able to resolve with vagal maneuvers.  Given metoprolol with improvement of heart rate    Results/clinical rationale were discussed with patient and daughter at bedside    Consultations/Discussion of results with other physicians: Given my concerns for CHF exacerbation as well as A-fib with RVR, discussed Dr. Guzman, hospitalist, and admitted to his service for continued observation and management    Disposition plan: Admit  -----        Final diagnoses:   Acute on chronic congestive heart failure, unspecified heart failure type          Franko Ornelas MD  12/21/24 4358

## 2024-12-22 ENCOUNTER — APPOINTMENT (OUTPATIENT)
Dept: CARDIOLOGY | Facility: HOSPITAL | Age: 89
End: 2024-12-22
Payer: MEDICARE

## 2024-12-22 LAB
ALBUMIN SERPL-MCNC: 3.8 G/DL (ref 3.5–5.2)
ALBUMIN/GLOB SERPL: 1.5 G/DL
ALP SERPL-CCNC: 96 U/L (ref 39–117)
ALT SERPL W P-5'-P-CCNC: 10 U/L (ref 1–41)
ANION GAP SERPL CALCULATED.3IONS-SCNC: 7.6 MMOL/L (ref 5–15)
AST SERPL-CCNC: 19 U/L (ref 1–40)
AV MEAN PRESS GRAD SYS DOP V1V2: 1.5 MMHG
AV VMAX SYS DOP: 78.9 CM/SEC
BH CV ECHO MEAS - AO MAX PG: 2.6 MMHG
BH CV ECHO MEAS - AO ROOT DIAM: 3.5 CM
BH CV ECHO MEAS - AO V2 VTI: 13.5 CM
BH CV ECHO MEAS - AVA(I,D): 4.7 CM2
BH CV ECHO MEAS - EDV(CUBED): 137.4 ML
BH CV ECHO MEAS - EDV(MOD-SP2): 109 ML
BH CV ECHO MEAS - EDV(MOD-SP4): 113 ML
BH CV ECHO MEAS - EF(MOD-SP2): 39.5 %
BH CV ECHO MEAS - EF(MOD-SP4): 37.3 %
BH CV ECHO MEAS - ESV(CUBED): 86.4 ML
BH CV ECHO MEAS - ESV(MOD-SP2): 65.9 ML
BH CV ECHO MEAS - ESV(MOD-SP4): 70.8 ML
BH CV ECHO MEAS - FS: 14.3 %
BH CV ECHO MEAS - IVS/LVPW: 1.01 CM
BH CV ECHO MEAS - IVSD: 1.26 CM
BH CV ECHO MEAS - LA DIMENSION: 5.4 CM
BH CV ECHO MEAS - LAT PEAK E' VEL: 10.6 CM/SEC
BH CV ECHO MEAS - LV DIASTOLIC VOL/BSA (35-75): 57.6 CM2
BH CV ECHO MEAS - LV MASS(C)D: 261.7 GRAMS
BH CV ECHO MEAS - LV MAX PG: 1.56 MMHG
BH CV ECHO MEAS - LV MEAN PG: 1 MMHG
BH CV ECHO MEAS - LV SYSTOLIC VOL/BSA (12-30): 36.1 CM2
BH CV ECHO MEAS - LV V1 MAX: 62.5 CM/SEC
BH CV ECHO MEAS - LV V1 VTI: 11.6 CM
BH CV ECHO MEAS - LVIDD: 5.2 CM
BH CV ECHO MEAS - LVIDS: 4.4 CM
BH CV ECHO MEAS - LVOT AREA: 5.4 CM2
BH CV ECHO MEAS - LVOT DIAM: 2.6 CM
BH CV ECHO MEAS - LVPWD: 1.25 CM
BH CV ECHO MEAS - MED PEAK E' VEL: 4.5 CM/SEC
BH CV ECHO MEAS - MV DEC SLOPE: 734 CM/SEC2
BH CV ECHO MEAS - MV DEC TIME: 0.15 SEC
BH CV ECHO MEAS - MV E MAX VEL: 109 CM/SEC
BH CV ECHO MEAS - MV MAX PG: 4.4 MMHG
BH CV ECHO MEAS - MV MEAN PG: 2 MMHG
BH CV ECHO MEAS - MV V2 VTI: 14.5 CM
BH CV ECHO MEAS - MVA(VTI): 4.3 CM2
BH CV ECHO MEAS - PA ACC TIME: 0.16 SEC
BH CV ECHO MEAS - RAP SYSTOLE: 8 MMHG
BH CV ECHO MEAS - RV MAX PG: 2.04 MMHG
BH CV ECHO MEAS - RV V1 MAX: 71.5 CM/SEC
BH CV ECHO MEAS - RV V1 VTI: 11.5 CM
BH CV ECHO MEAS - RVSP: 33.3 MMHG
BH CV ECHO MEAS - SV(LVOT): 63 ML
BH CV ECHO MEAS - SV(MOD-SP2): 43.1 ML
BH CV ECHO MEAS - SV(MOD-SP4): 42.2 ML
BH CV ECHO MEAS - SVI(LVOT): 32.1 ML/M2
BH CV ECHO MEAS - SVI(MOD-SP2): 22 ML/M2
BH CV ECHO MEAS - SVI(MOD-SP4): 21.5 ML/M2
BH CV ECHO MEAS - TAPSE (>1.6): 1.07 CM
BH CV ECHO MEAS - TR MAX PG: 25.3 MMHG
BH CV ECHO MEAS - TR MAX VEL: 251.5 CM/SEC
BH CV ECHO MEASUREMENTS AVERAGE E/E' RATIO: 14.44
BH CV XLRA - RV BASE: 4.2 CM
BH CV XLRA - RV LENGTH: 8.4 CM
BH CV XLRA - RV MID: 4.3 CM
BH CV XLRA - TDI S': 8.4 CM/SEC
BILIRUB SERPL-MCNC: 1.1 MG/DL (ref 0–1.2)
BUN SERPL-MCNC: 30 MG/DL (ref 8–23)
BUN/CREAT SERPL: 20.5 (ref 7–25)
CALCIUM SPEC-SCNC: 8.9 MG/DL (ref 8.6–10.5)
CHLORIDE SERPL-SCNC: 107 MMOL/L (ref 98–107)
CO2 SERPL-SCNC: 28.4 MMOL/L (ref 22–29)
CREAT SERPL-MCNC: 1.46 MG/DL (ref 0.76–1.27)
DEPRECATED RDW RBC AUTO: 53.7 FL (ref 37–54)
EGFRCR SERPLBLD CKD-EPI 2021: 46.3 ML/MIN/1.73
ERYTHROCYTE [DISTWIDTH] IN BLOOD BY AUTOMATED COUNT: 15.6 % (ref 12.3–15.4)
GLOBULIN UR ELPH-MCNC: 2.6 GM/DL
GLUCOSE BLDC GLUCOMTR-MCNC: 103 MG/DL (ref 70–130)
GLUCOSE BLDC GLUCOMTR-MCNC: 104 MG/DL (ref 70–130)
GLUCOSE BLDC GLUCOMTR-MCNC: 146 MG/DL (ref 70–130)
GLUCOSE BLDC GLUCOMTR-MCNC: 167 MG/DL (ref 70–130)
GLUCOSE BLDC GLUCOMTR-MCNC: 274 MG/DL (ref 70–130)
GLUCOSE SERPL-MCNC: 122 MG/DL (ref 65–99)
HCT VFR BLD AUTO: 41.3 % (ref 37.5–51)
HGB BLD-MCNC: 13.2 G/DL (ref 13–17.7)
LEFT ATRIUM VOLUME INDEX: 25.5 ML/M2
LV EF BIPLANE MOD: 38.2 %
MCH RBC QN AUTO: 30.2 PG (ref 26.6–33)
MCHC RBC AUTO-ENTMCNC: 32 G/DL (ref 31.5–35.7)
MCV RBC AUTO: 94.5 FL (ref 79–97)
PLATELET # BLD AUTO: 105 10*3/MM3 (ref 140–450)
PMV BLD AUTO: 10.6 FL (ref 6–12)
POTASSIUM SERPL-SCNC: 4.4 MMOL/L (ref 3.5–5.2)
PROT SERPL-MCNC: 6.4 G/DL (ref 6–8.5)
RBC # BLD AUTO: 4.37 10*6/MM3 (ref 4.14–5.8)
SODIUM SERPL-SCNC: 143 MMOL/L (ref 136–145)
WBC NRBC COR # BLD AUTO: 6.02 10*3/MM3 (ref 3.4–10.8)

## 2024-12-22 PROCEDURE — 80053 COMPREHEN METABOLIC PANEL: CPT | Performed by: INTERNAL MEDICINE

## 2024-12-22 PROCEDURE — 63710000001 INSULIN GLARGINE PER 5 UNITS: Performed by: INTERNAL MEDICINE

## 2024-12-22 PROCEDURE — 96376 TX/PRO/DX INJ SAME DRUG ADON: CPT

## 2024-12-22 PROCEDURE — G0378 HOSPITAL OBSERVATION PER HR: HCPCS

## 2024-12-22 PROCEDURE — 94660 CPAP INITIATION&MGMT: CPT

## 2024-12-22 PROCEDURE — 25010000002 FUROSEMIDE PER 20 MG: Performed by: INTERNAL MEDICINE

## 2024-12-22 PROCEDURE — 99204 OFFICE O/P NEW MOD 45 MIN: CPT | Performed by: INTERNAL MEDICINE

## 2024-12-22 PROCEDURE — 63710000001 INSULIN LISPRO (HUMAN) PER 5 UNITS: Performed by: INTERNAL MEDICINE

## 2024-12-22 PROCEDURE — 25010000002 FUROSEMIDE PER 20 MG: Performed by: FAMILY MEDICINE

## 2024-12-22 PROCEDURE — 94799 UNLISTED PULMONARY SVC/PX: CPT

## 2024-12-22 PROCEDURE — 82948 REAGENT STRIP/BLOOD GLUCOSE: CPT | Performed by: INTERNAL MEDICINE

## 2024-12-22 PROCEDURE — 82948 REAGENT STRIP/BLOOD GLUCOSE: CPT

## 2024-12-22 PROCEDURE — 93306 TTE W/DOPPLER COMPLETE: CPT

## 2024-12-22 PROCEDURE — 93306 TTE W/DOPPLER COMPLETE: CPT | Performed by: INTERNAL MEDICINE

## 2024-12-22 PROCEDURE — 85027 COMPLETE CBC AUTOMATED: CPT | Performed by: INTERNAL MEDICINE

## 2024-12-22 PROCEDURE — 99232 SBSQ HOSP IP/OBS MODERATE 35: CPT | Performed by: FAMILY MEDICINE

## 2024-12-22 RX ORDER — FUROSEMIDE 10 MG/ML
40 INJECTION INTRAMUSCULAR; INTRAVENOUS 2 TIMES DAILY
Status: DISCONTINUED | OUTPATIENT
Start: 2024-12-22 | End: 2024-12-23 | Stop reason: HOSPADM

## 2024-12-22 RX ADMIN — AMIODARONE HYDROCHLORIDE 200 MG: 200 TABLET ORAL at 09:21

## 2024-12-22 RX ADMIN — Medication 10 ML: at 09:22

## 2024-12-22 RX ADMIN — INSULIN GLARGINE 10 UNITS: 100 INJECTION, SOLUTION SUBCUTANEOUS at 21:24

## 2024-12-22 RX ADMIN — SACUBITRIL AND VALSARTAN 1 TABLET: 24; 26 TABLET, FILM COATED ORAL at 09:21

## 2024-12-22 RX ADMIN — Medication 1 TABLET: at 09:21

## 2024-12-22 RX ADMIN — OXYCODONE HYDROCHLORIDE AND ACETAMINOPHEN 1000 MG: 500 TABLET ORAL at 09:21

## 2024-12-22 RX ADMIN — FUROSEMIDE 80 MG: 10 INJECTION, SOLUTION INTRAMUSCULAR; INTRAVENOUS at 09:21

## 2024-12-22 RX ADMIN — FUROSEMIDE 40 MG: 10 INJECTION, SOLUTION INTRAMUSCULAR; INTRAVENOUS at 21:23

## 2024-12-22 RX ADMIN — Medication 10 ML: at 21:24

## 2024-12-22 RX ADMIN — APIXABAN 5 MG: 5 TABLET, FILM COATED ORAL at 12:31

## 2024-12-22 RX ADMIN — APIXABAN 5 MG: 5 TABLET, FILM COATED ORAL at 21:26

## 2024-12-22 RX ADMIN — INSULIN LISPRO 2 UNITS: 100 INJECTION, SOLUTION INTRAVENOUS; SUBCUTANEOUS at 12:31

## 2024-12-22 RX ADMIN — PRAVASTATIN SODIUM 40 MG: 20 TABLET ORAL at 09:21

## 2024-12-22 RX ADMIN — METOPROLOL SUCCINATE 12.5 MG: 25 TABLET, EXTENDED RELEASE ORAL at 09:21

## 2024-12-22 RX ADMIN — ALLOPURINOL 100 MG: 100 TABLET ORAL at 09:21

## 2024-12-22 RX ADMIN — SACUBITRIL AND VALSARTAN 1 TABLET: 24; 26 TABLET, FILM COATED ORAL at 21:23

## 2024-12-22 RX ADMIN — EMPAGLIFLOZIN 25 MG: 25 TABLET, FILM COATED ORAL at 09:21

## 2024-12-22 NOTE — DISCHARGE INSTRUCTIONS
-Continue taking Eliquis as prescribed, monitor for any bleeding  -We have prescribed you 3 days worth of Lasix, please take as prescribed  -Follow-up with your primary care provider in 2 to 3 days for recheck and continued care.  -Follow-up with your primary cardiologist Dr. Toussaint cardiac monitor and further treatment as directed.  -If you have worsening shortness of breath in spite of this therapy, please come back to the to the emergency department for further evaluation.  You are now stable for discharge.

## 2024-12-22 NOTE — PROGRESS NOTES
"Pharmacy Consult - Enoxaparin Dosing    Blane Dietz is a 89 y.o. male who has been consulted to dose enoxaparin for VTE prophylaxis.     Allergies    Ampicillin, Medrol [methylprednisolone], Myrbetriq [mirabegron], Penicillins, Bee venom, and Melatonin    Relevant clinical data and objective history reviewed:     [Ht: 168.9 cm (66.5\"); Wt: 87.8 kg (193 lb 9 oz)]  Body mass index is 30.77 kg/m².    Estimated Creatinine Clearance: 37.4 mL/min (A) (by C-G formula based on SCr of 1.46 mg/dL (H)).    Results from last 7 days   Lab Units 12/21/24  1855   HEMOGLOBIN g/dL 14.1   HEMATOCRIT % 43.3   PLATELETS 10*3/mm3 108*   CREATININE mg/dL 1.46*       Asessment/Plan    Initiate enoxaparin 40 mg SQ every 24 hours  Pharmacy will monitor Mr. Dietz's renal function and clinical status and adjust the enoxaparin dose and/or frequency as needed.    Thank you,  Raiza Abarca RP,PharmD  12/21/2024  22:43 EST      "

## 2024-12-22 NOTE — THERAPY EVALUATION
Attempted PT evaluation this pm. Pt presented supine in bed with eyes shut and CPAP on. Pt did open his eyes to his name. After therapist informed pt of reason for visit, pt declined PT at this time as he just wanted to get in a good nap. PT will plan to check back tomorrow. Nursing informed.

## 2024-12-22 NOTE — CONSULTS
Referring Provider: Deanne Bowles MD    Reason for Consultation: Atrial fibrillation/flutter with rapid ventricular rate      Patient Care Team:  David Em MD as PCP - General (Internal Medicine)  Ha Toussaint MD as Consulting Physician (Cardiology)  Shannan Ramon MD as Consulting Physician (Hematology and Oncology)  Xavier Boston MD as Consulting Physician (Endocrinology)  Brionna Weinstein DO as Surgeon (General Surgery)      SUBJECTIVE     Chief Complaint: Shortness of breath    History of present illness:  Blane Dietz is a 89 y.o. male with hypertension, hyperlipidemia, aortic stenosis status post Ross procedure, heart failure with midrange ejection fraction, diabetes, chronic kidney disease stage IIIb, atrial fibrillation/flutter on amiodarone and metoprolol, B-cell lymphoma who presented to the hospital with complaints of shortness of breath associated with leg edema.  In the ER he received IV diuretics.  ECG showed atrial flutter with rapid ventricular rate.  He has known right bundle branch block and therefore the initial rhythm was regular wide-complex tachycardia.  Monomorphic VT could not be completely ruled out.  Patient remained hemodynamically stable.  Another load during dose of amiodarone was provided to the patient.  He spontaneously converted into normal sinus rhythm      Review of systems:    Constitutional: No weakness, fatigue, fever, rigors, chills   Eyes: No vision changes, eye pain   ENT/oropharynx: No difficulty swallowing, sore throat, epistaxis, changes in hearing   Cardiovascular: No chest pain, chest tightness, palpitations, paroxysmal nocturnal dyspnea, orthopnea, diaphoresis, dizziness / syncopal episode   Respiratory: + shortness of breath, dyspnea on exertion, cough, wheezing, hemoptysis   Gastrointestinal: No abdominal pain, nausea, vomiting, diarrhea, bloody stools   Genitourinary: No hematuria, dysuria   Neurological: No headache, tremors,  numbness, one-sided weakness    Musculoskeletal: No cramps, myalgias, joint pain, joint swelling   Integument: No rash, + edema        Personal History:      Past Medical History:   Diagnosis Date    Aortic stenosis     status post ROSS procedure, remote, with December 2015 echocardiography revealing normal aortic and pulmonary valve function, normal ejection fraction and mild to moderate MR    Arthritis     Bilateral impacted cerumen 11/23/2016    Bronchitis     CHF (congestive heart failure)     Chronic gout of right foot 06/13/2016    Impression: 03/08/2016 - stable.;     COVID-19 vaccine series completed 05/12/2021    [place] 2nd dose 3/12/21.    Depression     Diabetes mellitus     Diverticulitis     Exogenous obesity     Gout     Heart murmur     History of vitamin D deficiency     Hypercholesteremia 08/11/2016    Hyperlipidemia     Hypertension     Kidney lesion     Malignant neoplasm of prostate     Morbid obesity 08/11/2016    Pneumonia 05/12/2021    Primary osteoarthritis involving multiple joints 06/13/2016    Impression: 03/08/2016 - stable;     Pulmonary embolism     Sleep apnea 05/12/2021    C-Pap    Uncontrolled type 2 diabetes mellitus with hyperglycemia 06/13/2016    Impression: 03/08/2016 - seeing Endo .;     Vertigo        Past Surgical History:   Procedure Laterality Date    CARDIAC VALVE REPLACEMENT  05/12/2021    Ross procedure 22 years ago    COLON SURGERY      COLONOSCOPY      COLOSTOMY  1999    COLOSTOMY REVISION      CYSTOSCOPY N/A 12/7/2021    Procedure: CYSTOSCOPY FLEXIBLE;  Surgeon: José Miguel Villafuerte Jr., MD;  Location: [facility] OR;  Service: Urology;  Laterality: N/A;    EXPLORATORY LAPAROTOMY      With Tissue Removal    LIPOMA EXCISION      MOHS SURGERY      NEPHROURETERECTOMY Right 12/7/2021    Procedure: RIGHT NEPHROURETERECTOMY LAPAROSCOPIC WITH DAVINCI ROBOT;  Surgeon: José Miguel Villafuerte Jr., MD;  Location: [facility] OR;  Service: Robotics - DaVinci;  Laterality: Right;    OTHER  "SURGICAL HISTORY      Porcine Valve    PROSTATE SURGERY      PROSTATECTOMY  2000     History of Prostatectomy Perineal Radical    SKIN CANCER EXCISION      from head and lip    TONSILLECTOMY         Family History   Problem Relation Age of Onset    Diabetes Mother     Lung cancer Mother     Cancer Mother     Heart disease Father     Breast cancer Other     Cancer Other     Hypertension Other     Migraines Other     Obesity Other     Diabetes Other        Social History     Tobacco Use    Smoking status: Never     Passive exposure: Never    Smokeless tobacco: Never   Vaping Use    Vaping status: Never Used   Substance Use Topics    Alcohol use: No    Drug use: No        Home meds:  Prior to Admission medications    Medication Sig Start Date End Date Taking? Authorizing Provider   allopurinol (ZYLOPRIM) 100 MG tablet TAKE 1 TABLET BY MOUTH DAILY 10/7/24   David Em MD   amiodarone (PACERONE) 200 MG tablet Take 1 tablet by mouth Daily. 12/5/22   Ha Toussaint MD   Apoaequorin (PREVAGEN PO) Take  by mouth.    ProviderDave MD   ascorbic acid (VITAMIN C) 1000 MG tablet Take 1 tablet by mouth Daily.    Dave Devi MD   Calcium Carb-Cholecalciferol 600-5 MG-MCG tablet Take 1 tablet by mouth Every Other Day. Indications: Low Amount of Calcium in the Blood 7/17/24   Olimpia Real APRN   ciclopirox (LOPROX) 1 % shampoo  8/21/24   Dave Devi MD   CINNAMON PO Take  by mouth.    Dave Devi MD   Droplet Insulin Syringe 31G X 5/16\" 0.5 ML misc  4/24/24   Dave Devi MD   Dulaglutide (Trulicity) 3 MG/0.5ML solution pen-injector Inject 0.5 mL under the skin into the appropriate area as directed 1 (One) Time Per Week. 7/25/24   David Em MD   Entresto 24-26 MG tablet TAKE ONE TABLET BY MOUTH EVERY 12 HOURS 10/24/24   David Em MD   enzalutamide (XTANDI) 40 MG tablet tablet Take 3 tablets by mouth Daily. 9/25/24   Shannan Ramon MD   fluocinonide (LIDEX) " 0.05 % external solution  8/19/24   Dave Devi MD   fluticasone (FLONASE) 50 MCG/ACT nasal spray 2 sprays into the nostril(s) as directed by provider Daily. 2 puffs each nostril  Patient taking differently: Administer 2 sprays into the nostril(s) as directed by provider Daily As Needed for Rhinitis or Allergies. 12/20/19   Rosanna Nicolas APRN   furosemide (LASIX) 20 MG tablet Take 1 tablet by mouth Daily for 5 days. 12/21/24 12/26/24  Franko Ornelas MD   glucose blood (True Metrix Blood Glucose Test) test strip Use to test blood sugar 3 times daily.  Dx E11.65 4/30/24   Sophia Ferreira PA   Jardiance 25 MG tablet tablet TAKE 1 TABLET BY MOUTH DAILY 10/7/24   David Em MD   ketoconazole (NIZORAL) 2 % shampoo Apply 1 application  topically to the appropriate area as directed 2 (Two) Times a Week. Indications: Tinea Versicolor 5/26/23   Dave Devi MD   Lantus 100 UNIT/ML injection Inject up to 50 units daily subcutaneously  Patient taking differently: Inject up to 20 units daily subcutaneously 7/21/23   Xavier Boston MD   metoprolol succinate XL (TOPROL-XL) 25 MG 24 hr tablet TAKE 1/2 TABLET BY MOUTH DAILY 10/7/24   David Em MD   multivitamin with minerals tablet tablet Take 1 tablet by mouth Daily. AREDS 2  Indications: vitamin deficiency    Dave Devi MD   pravastatin (PRAVACHOL) 40 MG tablet TAKE ONE TABLET BY MOUTH EVERY NIGHT AT BEDTIME 10/7/24   David Em MD   triamcinolone (KENALOG) 0.1 % cream Apply 1 Application topically to the appropriate area as directed 2 (Two) Times a Day.    Dave Devi MD   VITAMIN D, CHOLECALCIFEROL, PO Take  by mouth.    Dave Devi MD       Allergies:     Ampicillin, Medrol [methylprednisolone], Myrbetriq [mirabegron], Penicillins, Bee venom, and Melatonin    Scheduled Meds:allopurinol, 100 mg, Oral, Daily  amiodarone, 200 mg, Oral, Daily  ascorbic acid, 1,000 mg, Oral, Daily  [START ON  "12/23/2024] Calcium Carb-Cholecalciferol, 1 tablet, Oral, Once per day on Monday Wednesday Friday  empagliflozin, 25 mg, Oral, Daily  enoxaparin, 1 mg/kg, Subcutaneous, Q12H  furosemide, 80 mg, Intravenous, BID  insulin glargine, 1-200 Units, Subcutaneous, Nightly - Glucommander  insulin lispro, 1-200 Units, Subcutaneous, 4x Daily With Meals & Nightly  metoprolol succinate XL, 12.5 mg, Oral, Daily  multivitamin with minerals, 1 tablet, Oral, Daily  pravastatin, 40 mg, Oral, Daily  sacubitril-valsartan, 1 tablet, Oral, Q12H  sodium chloride, 10 mL, Intravenous, Q12H      Continuous Infusions:Pharmacy to Dose enoxaparin (LOVENOX),       PRN Meds:  acetaminophen    senna-docusate sodium **AND** polyethylene glycol **AND** bisacodyl **AND** bisacodyl    calcium carbonate    Calcium Replacement - Follow Nurse / BPA Driven Protocol    dextrose    dextrose    fluticasone    glucagon (human recombinant)    insulin lispro    Magnesium Standard Dose Replacement - Follow Nurse / BPA Driven Protocol    metoprolol tartrate    nitroglycerin    ondansetron ODT **OR** ondansetron    Pharmacy to Dose enoxaparin (LOVENOX)    Phosphorus Replacement - Follow Nurse / BPA Driven Protocol    Potassium Replacement - Follow Nurse / BPA Driven Protocol    sodium chloride    sodium chloride      OBJECTIVE    Vital Signs  Vitals:    12/21/24 2233 12/22/24 0410 12/22/24 0700 12/22/24 0857   BP: 150/87 115/71 101/60 101/60   BP Location: Left arm Left arm Left arm    Patient Position: Lying Lying Lying    Pulse: 103 89     Resp: 16 22 22    Temp:  97.4 °F (36.3 °C) 97.4 °F (36.3 °C)    TempSrc:  Axillary Axillary    SpO2: 98% 98% 100%    Weight: 87.8 kg (193 lb 9 oz) 87 kg (191 lb 12.8 oz)  87 kg (191 lb 12.8 oz)   Height: 168.9 cm (66.5\")   168.9 cm (66.5\")       Flowsheet Rows      Flowsheet Row First Filed Value   Admission Height 168.9 cm (66.5\") Documented at 12/21/2024 2233   Admission Weight 87.8 kg (193 lb 9 oz) Documented at 12/21/2024 " 2233              Intake/Output Summary (Last 24 hours) at 12/22/2024 0904  Last data filed at 12/22/2024 0845  Gross per 24 hour   Intake 100 ml   Output 2375 ml   Net -2275 ml        Telemetry: Paroxysmal atrial flutter with rapid ventricular rate, currently normal sinus rhythm    Physical Exam:  The patient is alert, oriented and in no distress.  Vital signs as noted above.  Head and neck revealed no carotid bruits or jugular venous distention.  No thyromegaly or lymphadenopathy is present  Lungs clear.  No wheezing.  Breath sounds are normal bilaterally.  Heart: Normal first and second heart sounds. No murmur.  No precordial rub is present.  No gallop is present.  Abdomen: Soft and nontender.  No organomegaly is present.  Extremities with good peripheral pulses without any pedal edema.  Skin: Warm and dry.  Musculoskeletal system is grossly normal.  CNS grossly normal.       Results Review:  I have personally reviewed the results from the time of this admission to 12/22/2024 09:04 EST and agree with these findings:  []  Laboratory  []  Microbiology  []  Radiology  []  EKG/Telemetry   []  Cardiology/Vascular   []  Pathology  []  Old records  []  Other:    Most notable findings include:     Lab Results (last 24 hours)       Procedure Component Value Units Date/Time    POC Glucose 4x Daily Before Meals & at Bedtime [[id number]]  (Normal) Collected: 12/22/24 0715    Specimen: Blood Updated: 12/22/24 0727     Glucose 104 mg/dL      Comment: Serial Number: [id number]:  [id number]       Comprehensive Metabolic Panel [[id number]]  (Abnormal) Collected: 12/22/24 0609    Specimen: Blood Updated: 12/22/24 0639     Glucose 122 mg/dL      BUN 30 mg/dL      Creatinine 1.46 mg/dL      Sodium 143 mmol/L      Potassium 4.4 mmol/L      Chloride 107 mmol/L      CO2 28.4 mmol/L      Calcium 8.9 mg/dL      Total Protein 6.4 g/dL      Albumin 3.8 g/dL      ALT (SGPT) 10 U/L      AST (SGOT) 19 U/L      Alkaline Phosphatase 96 U/L       Total Bilirubin 1.1 mg/dL      Globulin 2.6 gm/dL      A/G Ratio 1.5 g/dL      BUN/Creatinine Ratio 20.5     Anion Gap 7.6 mmol/L      eGFR 46.3 mL/min/1.73     Narrative:      GFR Categories in Chronic Kidney Disease (CKD)      GFR Category          GFR (mL/min/1.73)    Interpretation  G1                     90 or greater         Normal or high (1)  G2                      60-89                Mild decrease (1)  G3a                   45-59                Mild to moderate decrease  G3b                   30-44                Moderate to severe decrease  G4                    15-29                Severe decrease  G5                    14 or less           Kidney failure          (1)In the absence of evidence of kidney disease, neither GFR category G1 or G2 fulfill the criteria for CKD.    eGFR calculation 2021 CKD-EPI creatinine equation, which does not include race as a factor    CBC (No Diff) [[id number]]  (Abnormal) Collected: 12/22/24 0609    Specimen: Blood Updated: 12/22/24 0625     WBC 6.02 10*3/mm3      RBC 4.37 10*6/mm3      Hemoglobin 13.2 g/dL      Hematocrit 41.3 %      MCV 94.5 fL      MCH 30.2 pg      MCHC 32.0 g/dL      RDW 15.6 %      RDW-SD 53.7 fl      MPV 10.6 fL      Platelets 105 10*3/mm3     POC Glucose Once [[id number]]  (Abnormal) Collected: 12/21/24 2244    Specimen: Blood Updated: 12/22/24 0008     Glucose 274 mg/dL      Comment: Serial Number: [id number]:  [id number]       TSH [[id number]]  (Normal) Collected: 12/21/24 2000    Specimen: Blood Updated: 12/21/24 2207     TSH 2.090 uIU/mL     T4, Free [[id number]]  (Normal) Collected: 12/21/24 2000    Specimen: Blood Updated: 12/21/24 2207     Free T4 1.61 ng/dL     High Sensitivity Troponin T 1Hr [[id number]]  (Abnormal) Collected: 12/21/24 2000    Specimen: Blood Updated: 12/21/24 2029     HS Troponin T 31 ng/L      Troponin T Numeric Delta -6 ng/L      Troponin T % Delta -16 %     Narrative:      High Sensitive Troponin T Reference  Range:  <14.0 ng/L- Negative Female for AMI  <22.0 ng/L- Negative Male for AMI  >=14 - Abnormal Female indicating possible myocardial injury.  >=22 - Abnormal Male indicating possible myocardial injury.   Clinicians would have to utilize clinical acumen, EKG, Troponin, and serial changes to determine if it is an Acute Myocardial Infarction or myocardial injury due to an underlying chronic condition.         BNP [[id number]]  (Abnormal) Collected: 12/21/24 1855    Specimen: Blood Updated: 12/21/24 1950     proBNP 8,775.0 pg/mL     Narrative:      This assay is used as an aid in the diagnosis of individuals suspected of having heart failure. It can be used as an aid in the diagnosis of acute decompensated heart failure (ADHF) in patients presenting with signs and symptoms of ADHF to the emergency department (ED). In addition, NT-proBNP of <300 pg/mL indicates ADHF is not likely.    Age Range Result Interpretation  NT-proBNP Concentration (pg/mL:      <50             Positive            >450                   Gray                 300-450                    Negative             <300    50-75           Positive            >900                  Gray                300-900                  Negative            <300      >75             Positive            >1800                  Gray                300-1800                  Negative            <300    COVID-19 and FLU A/B PCR, 1 HR TAT - Swab, Nasopharynx [[id number]]  (Normal) Collected: 12/21/24 1912    Specimen: Swab from Nasopharynx Updated: 12/1937     COVID19 Not Detected     Influenza A PCR Not Detected     Influenza B PCR Not Detected    Narrative:      Fact sheet for providers: https://www.fda.gov/media/597946/download    Fact sheet for patients: https://www.fda.gov/media/518092/download    Test performed by PCR.    Procalcitonin [[id number]]  (Normal) Collected: 12/21/24 1855    Specimen: Blood Updated: 12/21/24 1928     Procalcitonin 0.08 ng/mL     Narrative:    "   As a Marker for Sepsis (Non-Neonates):    1. <0.5 ng/mL represents a low risk of severe sepsis and/or septic shock.  2. >2 ng/mL represents a high risk of severe sepsis and/or septic shock.    As a Marker for Lower Respiratory Tract Infections that require antibiotic therapy:    PCT on Admission    Antibiotic Therapy       6-12 Hrs later    >0.5                Strongly Recommended  >0.25 - <0.5        Recommended   0.1 - 0.25          Discouraged              Remeasure/reassess PCT  <0.1                Strongly Discouraged     Remeasure/reassess PCT    As 28 day mortality risk marker: \"Change in Procalcitonin Result\" (>80% or <=80%) if Day 0 (or Day 1) and Day 4 values are available. Refer to http://www.[facility] [employer]pct-calculator.com    Change in PCT <=80%  A decrease of PCT levels below or equal to 80% defines a positive change in PCT test result representing a higher risk for 28-day all-cause mortality of patients diagnosed with severe sepsis for septic shock.    Change in PCT >80%  A decrease of PCT levels of more than 80% defines a negative change in PCT result representing a lower risk for 28-day all-cause mortality of patients diagnosed with severe sepsis or septic shock.       High Sensitivity Troponin T [[id number]]  (Abnormal) Collected: 12/21/24 1855    Specimen: Blood Updated: 12/21/24 1925     HS Troponin T 37 ng/L     Narrative:      High Sensitive Troponin T Reference Range:  <14.0 ng/L- Negative Female for AMI  <22.0 ng/L- Negative Male for AMI  >=14 - Abnormal Female indicating possible myocardial injury.  >=22 - Abnormal Male indicating possible myocardial injury.   Clinicians would have to utilize clinical acumen, EKG, Troponin, and serial changes to determine if it is an Acute Myocardial Infarction or myocardial injury due to an underlying chronic condition.         C-reactive Protein [[id number]]  (Normal) Collected: 12/21/24 1855    Specimen: Blood Updated: 12/21/24 1925     C-Reactive Protein <0.30 " mg/dL     Comprehensive Metabolic Panel [[id number]]  (Abnormal) Collected: 12/21/24 1855    Specimen: Blood Updated: 12/21/24 1923     Glucose 232 mg/dL      Comment: Glucose >180, Hemoglobin A1C recommended.        BUN 30 mg/dL      Creatinine 1.46 mg/dL      Sodium 138 mmol/L      Potassium 4.1 mmol/L      Chloride 101 mmol/L      CO2 23.0 mmol/L      Calcium 8.8 mg/dL      Total Protein 6.9 g/dL      Albumin 4.1 g/dL      ALT (SGPT) 11 U/L      AST (SGOT) 21 U/L      Alkaline Phosphatase 103 U/L      Total Bilirubin 1.5 mg/dL      Globulin 2.8 gm/dL      A/G Ratio 1.5 g/dL      BUN/Creatinine Ratio 20.5     Anion Gap 14.0 mmol/L      eGFR 46.3 mL/min/1.73     Narrative:      GFR Categories in Chronic Kidney Disease (CKD)      GFR Category          GFR (mL/min/1.73)    Interpretation  G1                     90 or greater         Normal or high (1)  G2                      60-89                Mild decrease (1)  G3a                   45-59                Mild to moderate decrease  G3b                   30-44                Moderate to severe decrease  G4                    15-29                Severe decrease  G5                    14 or less           Kidney failure          (1)In the absence of evidence of kidney disease, neither GFR category G1 or G2 fulfill the criteria for CKD.    eGFR calculation 2021 CKD-EPI creatinine equation, which does not include race as a factor    Magnesium [[id number]]  (Normal) Collected: 12/21/24 1855    Specimen: Blood Updated: 12/21/24 1923     Magnesium 2.2 mg/dL     CBC & Differential [[id number]]  (Abnormal) Collected: 12/21/24 1855    Specimen: Blood Updated: 12/21/24 1906    Narrative:      The following orders were created for panel order CBC & Differential.  Procedure                               Abnormality         Status                     ---------                               -----------         ------                     CBC Auto Differential[[id number]]         Abnormal            Final result               Scan Slide[[id number]]                                                                    Please view results for these tests on the individual orders.    CBC Auto Differential [[id number]]  (Abnormal) Collected: 12/21/24 1855    Specimen: Blood Updated: 12/21/24 1906     WBC 6.49 10*3/mm3      RBC 4.60 10*6/mm3      Hemoglobin 14.1 g/dL      Hematocrit 43.3 %      MCV 94.1 fL      MCH 30.7 pg      MCHC 32.6 g/dL      RDW 15.7 %      RDW-SD 53.9 fl      MPV 10.7 fL      Platelets 108 10*3/mm3      Neutrophil % 65.6 %      Lymphocyte % 19.1 %      Monocyte % 8.8 %      Eosinophil % 3.9 %      Basophil % 1.2 %      Immature Grans % 1.4 %      Neutrophils, Absolute 4.26 10*3/mm3      Lymphocytes, Absolute 1.24 10*3/mm3      Monocytes, Absolute 0.57 10*3/mm3      Eosinophils, Absolute 0.25 10*3/mm3      Basophils, Absolute 0.08 10*3/mm3      Immature Grans, Absolute 0.09 10*3/mm3      nRBC 0.0 /100 WBC             Imaging Results (Last 24 Hours)       Procedure Component Value Units Date/Time    CT Angiogram Chest Pulmonary Embolism [[id number]] Collected: 12/21/24 2008     Updated: 12/21/24 2009    Narrative:      FINAL REPORT    TECHNIQUE:  null    CLINICAL HISTORY:  Shortness of breath x 1 week, recent respiratory illness 2  months ago, history of PE, eval PE    COMPARISON:  null    FINDINGS:  Exam: CTA Chest with IV contrast.    Procedure: Contrast was administered. Coronal and sagittal MIP reformats were performed    Comparison: 11/21/2024    Clinical history: Shortness of breath x1 week. Recent respiratory illness 2 months ago. History of PE. Eval PE    Findings:    Evaluation for PE in the subsegmental arteries of the lower lobes bilaterally is limited due to suboptimal opacification. No pulmonary emboli identified elsewhere in the chest.    Prior median sternotomy and CABG.    Ascending thoracic aortic aneurysm measuring 4.4 cm is stable compared to 11/21/2024    No  thoracic aortic aneurysm.    No hilar or mediastinal adenopathy.    Cardiomegaly    No pericardial fluid collection.    No pneumothorax.    Small bilateral pleural fluid collections are similar to prior exam.    Bilateral lower lobe consolidation may represent atelectasis or pneumonia.    Nodular cirrhotic appearing liver, unchanged .    Visualized liver, spleen and pancreas do not demonstrate any acute process.    No gallstones.    No thoracic spine compression fractures      Impression:      Impression:    1. Evaluation for PE in the subsegmental arteries of the lower lobes bilaterally is limited due to suboptimal opacification. No pulmonary emboli identified elsewhere in the chest.    2. Ascending thoracic aortic aneurysm is unchanged in size compared to prior exam. No contrast within the aorta.    3. Stable small bilateral pleural fluid collections. Bilateral lower lobe consolidation may represent pneumonia or atelectasis.    4. Nodular cirrhotic appearing liver, unchanged.    Authenticated and Electronically Signed by Leatha Velasco MD  on 12/21/2024 08:08:28 PM    XR Chest 1 View [[id number]] Resulted: 12/21/24 1931     Updated: 12/21/24 1931            LAB RESULTS (LAST 7 DAYS)    CBC  Results from last 7 days   Lab Units 12/22/24  0609 12/21/24  1855   WBC 10*3/mm3 6.02 6.49   RBC 10*6/mm3 4.37 4.60   HEMOGLOBIN g/dL 13.2 14.1   HEMATOCRIT % 41.3 43.3   MCV fL 94.5 94.1   PLATELETS 10*3/mm3 105* 108*       BMP  Results from last 7 days   Lab Units 12/22/24  0609 12/21/24  1855   SODIUM mmol/L 143 138   POTASSIUM mmol/L 4.4 4.1   CHLORIDE mmol/L 107 101   CO2 mmol/L 28.4 23.0   BUN mg/dL 30* 30*   CREATININE mg/dL 1.46* 1.46*   GLUCOSE mg/dL 122* 232*   MAGNESIUM mg/dL  --  2.2       CMP   Results from last 7 days   Lab Units 12/22/24  0609 12/21/24  1855   SODIUM mmol/L 143 138   POTASSIUM mmol/L 4.4 4.1   CHLORIDE mmol/L 107 101   CO2 mmol/L 28.4 23.0   BUN mg/dL 30* 30*   CREATININE mg/dL 1.46* 1.46*    GLUCOSE mg/dL 122* 232*   ALBUMIN g/dL 3.8 4.1   BILIRUBIN mg/dL 1.1 1.5*   ALK PHOS U/L 96 103   AST (SGOT) U/L 19 21   ALT (SGPT) U/L 10 11       BNP        TROPONIN  Results from last 7 days   Lab Units 12/21/24 2000   HSTROP T ng/L 31*       CoAg        Creatinine Clearance  Estimated Creatinine Clearance: 37.2 mL/min (A) (by C-G formula based on SCr of 1.46 mg/dL (H)).    ABG          Radiology  CT Angiogram Chest Pulmonary Embolism    Result Date: 12/21/2024  Impression: 1. Evaluation for PE in the subsegmental arteries of the lower lobes bilaterally is limited due to suboptimal opacification. No pulmonary emboli identified elsewhere in the chest. 2. Ascending thoracic aortic aneurysm is unchanged in size compared to prior exam. No contrast within the aorta. 3. Stable small bilateral pleural fluid collections. Bilateral lower lobe consolidation may represent pneumonia or atelectasis. 4. Nodular cirrhotic appearing liver, unchanged. Authenticated and Electronically Signed by Leatha Velasco MD on 12/21/2024 08:08:28 PM       EKG  I personally viewed and interpreted the patient's EKG/Telemetry data:  ECG 12 Lead Rhythm Change   Final Result      ECG 12 Lead Dyspnea   Final Result            Echocardiogram:    Results for orders placed during the hospital encounter of 12/21/24    Adult Transthoracic Echo Complete W/ Cont if Necessary Per Protocol    Interpretation Summary    Left ventricular systolic function is moderately decreased. Calculated left ventricular EF = 38.2% Left ventricular ejection fraction appears to be 36 - 40%.    The left ventricular cavity is mildly dilated.    Left ventricular diastolic dysfunction is noted.    Mildly reduced right ventricular systolic function noted.    The right ventricular cavity is mildly dilated.    The left atrial cavity is dilated.    The right atrial cavity is dilated.    Moderate aortic valve regurgitation is present.  Patient has a history of Ross procedure.     Moderate mitral valve regurgitation is present.    Estimated right ventricular systolic pressure from tricuspid regurgitation is normal (<35 mmHg).        Stress Test:        Cardiac Catheterization:  No results found for this or any previous visit.        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 twice daily    Heart failure with reduced ejection fraction  Previous echocardiogram showed EF of 45 to 50%  proBNP of 8700  Continue diuretics  Continue Jardiance, beta-blocker  Continue Entresto  Repeat echocardiogram shows EF of 38% with moderate aortic and mitral valve regurgitation  Continue close follow-up with outpatient cardiologist    Aortic stenosis  Status post Ross procedure  Echocardiogram shows at least moderate aortic valve regurgitation  Continue follow-up with outpatient cardiology.  Continue diuresis    Hyperlipidemia  Continue pravastatin  LDL is 62 and at goal    Diabetes  Continue insulin  A1c is 9.2    Chronic kidney disease  Creatinine 1.5, GFR is 46.3.  Closely monitor renal function while on diuretics        Danny Yun MD  12/22/24  09:04 EST

## 2024-12-22 NOTE — CASE MANAGEMENT/SOCIAL WORK
Discharge Planning Assessment   [facility]     Patient Name: Blane Dietz  MRN: [id number]  Today's Date: 12/22/2024    Admit Date: 12/21/2024    Plan: Retrun Home Asisited Living [facility] has agreee to Home Health if needed   Discharge Needs Assessment       Row Name 12/22/24 1234       Living Environment    People in Home other (see comments)  assisted living    Current Living Arrangements assisted living facility    Potentially Unsafe Housing Conditions none    In the past 12 months has the electric, gas, oil, or water company threatened to shut off services in your home? No    Primary Care Provided by self    Provides Primary Care For no one    Family Caregiver if Needed child(abi), adult    Quality of Family Relationships helpful    Able to Return to Prior Arrangements yes       Resource/Environmental Concerns    Resource/Environmental Concerns none    Transportation Concerns none       Transportation Needs    In the past 12 months, has lack of transportation kept you from medical appointments or from getting medications? no    In the past 12 months, has lack of transportation kept you from meetings, work, or from getting things needed for daily living? No       Food Insecurity    Within the past 12 months, you worried that your food would run out before you got the money to buy more. Never true    Within the past 12 months, the food you bought just didn't last and you didn't have money to get more. Never true       Transition Planning    Patient/Family Anticipates Transition to home with help/services    Patient/Family Anticipated Services at Transition none    Transportation Anticipated health plan transportation       Discharge Needs Assessment    Readmission Within the Last 30 Days no previous admission in last 30 days    Equipment Currently Used at Home cpap;oxygen;walker, rolling    Concerns to be Addressed discharge planning    Do you want help finding or keeping work or a job? I do not need or  want help    Do you want help with school or training? For example, starting or completing job training or getting a high school diploma, GED or equivalent No    Anticipated Changes Related to Illness none                   Discharge Plan       Row Name 12/22/24 1237       Plan    Plan Return Home assisted  Living [facility] has agree to Home Health if needed    Patient/Family in Agreement with Plan yes    Plan Comments PT is a resident of  [address] .He uses a walker has a CPAP  with  Oxygen 2 Liters  Bled  through the CPAP [facility] .Reports has had some issues with the CPAP  insurance Plans on purchasing one self pay . Uses a walker .Plan is to return to [facility]  [place] ,He  may transport home if [facility]  cannot Transport Has Agreed to Home  Health if needed Does not have  a preference .                  Continued Care and Services - Admitted Since 12/21/2024    No active coordination exists for this encounter.       Selected Continued Care - Episodes Includes continued care and service providers with selected services from the active episodes listed below      Oncology Episode start date: 9/11/2023   There are no active outsourced providers for this episode.                    Demographic Summary       Row Name 12/22/24 1232       General Information    Arrived From home    Required Notices Provided Observation Status Notice    Referral Source admission list    Reason for Consult discharge planning    Preferred Language English                   Functional Status       Row Name 12/22/24 1233       Functional Status    Usual Activity Tolerance fair    Current Activity Tolerance fair       Physical Activity    On average, how many days per week do you engage in moderate to strenuous exercise (like a brisk walk)? 0 days    On average, how many minutes do you engage in exercise at this level? 0 min    Number of minutes of exercise per week 0       Functional Status, IADL     Medications independent    Meal Preparation assistive person    Housekeeping completely dependent    Laundry completely dependent    Shopping completely dependent    If for any reason you need help with day-to-day activities such as bathing, preparing meals, shopping, managing finances, etc., do you get the help you need? I get all the help I need       Mental Status    General Appearance WDL WDL                   Psychosocial    No documentation.                  Abuse/Neglect    No documentation.                  Legal    No documentation.                  Substance Abuse    No documentation.                  Patient Forms    No documentation.                     Nikcy Castellano RN

## 2024-12-22 NOTE — PLAN OF CARE
Problem: Noninvasive Ventilation Acute  Goal: Effective Unassisted Ventilation and Oxygenation  Outcome: Progressing  Intervention: Monitor and Manage Noninvasive Ventilation  Recent Flowsheet Documentation  Taken 12/22/2024 0714 by Nico Ornelas RRT  Airway/Ventilation Management: airway patency maintained  NPPV/CPAP Maintenance: proper fit/secure     Problem: Heart Failure  Goal: Effective Oxygenation and Ventilation  Intervention: Optimize Oxygenation and Ventilation  Recent Flowsheet Documentation  Taken 12/22/2024 0714 by Nico Ornelas RRT  Head of Bed (HOB) Positioning: HOB elevated  Airway/Ventilation Management: airway patency maintained  Goal: Effective Breathing Pattern During Sleep  Intervention: Monitor and Manage Obstructive Sleep Apnea  Recent Flowsheet Documentation  Taken 12/22/2024 0714 by Nico Ornelas RRT  NPPV/CPAP Maintenance: proper fit/secure     Problem: Skin Injury Risk Increased  Goal: Skin Health and Integrity  Intervention: Optimize Skin Protection  Recent Flowsheet Documentation  Taken 12/22/2024 0714 by Nico Ornelas RRT  Head of Bed (HOB) Positioning: HOB elevated   Goal Outcome Evaluation:         RT EQUIPMENT DEVICE RELATED - SKIN ASSESSMENT    Chuck Score:  Chuck Score: 17     RT Medical Equipment/Device:     NIV Mask:  Full-face    size: l    Skin Assessment:      Nose:  Intact    Device Skin Pressure Protection:        Nurse Notification:  No    Nico Ornelas RRT

## 2024-12-22 NOTE — PAYOR COMM NOTE
"OBSERVATION NOTIFICATION    To:  [employer]  From: Ming Thomas RN  Phone: [phone]  Fax: [phone]  NPI: [id number]  TIN: [id number]      Rc Blandon \"CHEO\" (89 y.o. Male)       Date of Birth   1935    Social Security Number       Address   [address]  [facility] [address]    Home Phone       MRN   [id number]       [employer]    Marital Status   Single                            Admission Date   24    Admission Type   Emergency    Admitting Provider   Emery Guzman DO    Attending Provider   Deanne Bowles MD    Department, Room/Bed   [facility] TELEMETRY , 424/       Discharge Date       Discharge Disposition       Discharge Destination                                 Attending Provider: Deanne Bowles MD    Allergies: Ampicillin, Medrol [Methylprednisolone], Myrbetriq [Mirabegron], Penicillins, Bee Venom, Melatonin    Isolation: None   Infection: None   Code Status: CPR    Ht: 168.9 cm (66.5\")   Wt: 87 kg (191 lb 12.8 oz)    Admission Cmt: None   Principal Problem: Atrial arrhythmia [I49.8]                   Active Insurance as of 2024       Primary Coverage       Payor Plan Insurance Group Employer/Plan Group    [employer] MEDICARE REPLACEMENT [employer] MED ADV GROUP [id number]       Payor Plan Address Payor Plan Phone Number Payor Plan Fax Number Effective Dates    [address] [address] [phone]  2018 - None Entered    [facility] [id number]         Subscriber Name Subscriber Birth Date Member ID       RC BLANDON 1935 [id number]                     Emergency Contacts        (Rel.) Home Phone Work Phone Mobile Phone    Susana Blandon (Power of ) [phone] -- [phone]                 History & Physical        Emery Guzman DO at 24 [facility]8            [facility] HOSPITALIST   HISTORY AND PHYSICAL      Name:  Rc Blandon   Age:  89 y.o.  Sex:  male  :  1935  MRN:  [id number]   Visit " Number:  [id number]  Admission Date:  12/21/2024  Date Of Service:  12/21/24  Primary Care Physician:  David Em MD    Chief Complaint:     dyspnea    History Of Presenting Illness:      89-year-old male with past medical history of atrial fibrillation, aortic stenosis, status post aortic and pulmonic valve replacement, HFrEF, diabetes mellitus, gout, hypertension, hyperlipidemia, history of nephrectomy, history of prostatectomy secondary to malignancy, B-cell lymphoma with bony metastasis, sleep apnea.  Chief complaint: Shortness of breath. Increased shortness of breath over the last 24 hours.  Associated with leg swelling. He does report that he has been off anticoagulation.  Was given 80 mg IV Lasix in the ED he was maintaining oxygen sats and was planning for discharge made a loop around the ED and started feeling funny they put him back on the monitor and found he was having RVR.  After which family did not feel comfortable taking him home. Discussed his case with Dr. Yun who recommended we admit and continue amiodarone, metoprolol. Not sure why he was taken off blood thinners. Full code.    Pertinent findings: Highly sensitive troponin 37 and then 31 which is at or near baseline, creatinine 0.46 which is at or near baseline, glucose 232, hemoglobin 14.1, flu negative, BNP 8775, chest x-ray shows a trace left and small right pleural effusion shows right airspace disease in the lower lobe perhaps somewhat magnified by the pacer pad, CTA of the chest showing no PE stable aneurysm stable small bilateral effusions and lower lobe consolidation pneumonia versus atelectasis, cirrhotic appearing liver.  EKG shows a regular narrow complex tachycardia with a rate of about 175 associated interventricular conduction delay nonspecific ST and T wave changes, initial EKG shows, sinus rhythm with a right bundle branch block with nonspecific T wave changes prominent U waves. Review of telemetry shows Wide complex at  baseline which flipped axis during his episode of tachycardia.    ED Medications:    Medications   iopamidol (ISOVUE-300) 61 % injection 85 mL (85 mL Intravenous Given 12/21/24 1942)   furosemide (LASIX) injection 80 mg (80 mg Intravenous Given 12/21/24 2000)   metoprolol tartrate (LOPRESSOR) tablet 25 mg (25 mg Oral Given 12/21/24 2139)           Edited by: Emery Guzman DO at 12/21/2024 1872     Review Of Systems:    All systems were reviewed and negative except as mentioned in history of presenting illness, assessment and plan.    Past Medical History: Patient  has a past medical history of Aortic stenosis, Arthritis, Bilateral impacted cerumen (11/23/2016), Bronchitis, CHF (congestive heart failure), Chronic gout of right foot (06/13/2016), COVID-19 vaccine series completed (05/12/2021), Depression, Diabetes mellitus, Diverticulitis, Exogenous obesity, Gout, Heart murmur, History of vitamin D deficiency, Hypercholesteremia (08/11/2016), Hyperlipidemia, Hypertension, Kidney lesion, Malignant neoplasm of prostate, Morbid obesity (08/11/2016), Pneumonia (05/12/2021), Primary osteoarthritis involving multiple joints (06/13/2016), Pulmonary embolism, Sleep apnea (05/12/2021), Uncontrolled type 2 diabetes mellitus with hyperglycemia (06/13/2016), and Vertigo.    Past Surgical History: Patient  has a past surgical history that includes Colostomy (1999); Other surgical history; Exploratory laparotomy; Colonoscopy; Revision Colostomy; Prostate surgery; Prostatectomy (2000); Colon surgery; Tonsillectomy; Skin cancer excision; Lipoma Excision; Mohs surgery; Cardiac valve replacement (05/12/2021); nephroureterectomy (Right, 12/7/2021); and Cystoscopy (N/A, 12/7/2021).    Social History: Patient  reports that he has never smoked. He has never been exposed to tobacco smoke. He has never used smokeless tobacco. He reports that he does not drink alcohol and does not use drugs.    Family History:  Patient's family history  "has been reviewed and found to be noncontributory.     Allergies:      Ampicillin, Medrol [methylprednisolone], Myrbetriq [mirabegron], Penicillins, Bee venom, and Melatonin    Home Medications:    Prior to Admission Medications       Prescriptions Last Dose Informant Patient Reported? Taking?    allopurinol (ZYLOPRIM) 100 MG tablet   No No    TAKE 1 TABLET BY MOUTH DAILY    amiodarone (PACERONE) 200 MG tablet   Yes No    Take 1 tablet by mouth Daily.    Apoaequorin (PREVAGEN PO)   Yes No    Take  by mouth.    ascorbic acid (VITAMIN C) 1000 MG tablet   Yes No    Take 1 tablet by mouth Daily.    Calcium Carb-Cholecalciferol 600-5 MG-MCG tablet   No No    Take 1 tablet by mouth Every Other Day. Indications: Low Amount of Calcium in the Blood    ciclopirox (LOPROX) 1 % shampoo   Yes No    CINNAMON PO   Yes No    Take  by mouth.    Droplet Insulin Syringe 31G X 5/16\" 0.5 ML misc   Yes No    Dulaglutide (Trulicity) 3 MG/0.5ML solution pen-injector   No No    Inject 0.5 mL under the skin into the appropriate area as directed 1 (One) Time Per Week.    Entresto 24-26 MG tablet   No No    TAKE ONE TABLET BY MOUTH EVERY 12 HOURS    enzalutamide (XTANDI) 40 MG tablet tablet   No No    Take 3 tablets by mouth Daily.    fluocinonide (LIDEX) 0.05 % external solution   Yes No    fluticasone (FLONASE) 50 MCG/ACT nasal spray   No No    2 sprays into the nostril(s) as directed by provider Daily. 2 puffs each nostril    Patient taking differently:  Administer 2 sprays into the nostril(s) as directed by provider Daily As Needed for Rhinitis or Allergies.    glucose blood (True Metrix Blood Glucose Test) test strip   No No    Use to test blood sugar 3 times daily.  Dx E11.65    Jardiance 25 MG tablet tablet   No No    TAKE 1 TABLET BY MOUTH DAILY    ketoconazole (NIZORAL) 2 % shampoo   Yes No    Apply 1 application  topically to the appropriate area as directed 2 (Two) Times a Week. Indications: Tinea Versicolor    Lantus 100 UNIT/ML " "injection   No No    Inject up to 50 units daily subcutaneously    Patient taking differently:  Inject up to 20 units daily subcutaneously    metoprolol succinate XL (TOPROL-XL) 25 MG 24 hr tablet   No No    TAKE 1/2 TABLET BY MOUTH DAILY    multivitamin with minerals tablet tablet   Yes No    Take 1 tablet by mouth Daily. AREDS 2  Indications: vitamin deficiency    pravastatin (PRAVACHOL) 40 MG tablet   No No    TAKE ONE TABLET BY MOUTH EVERY NIGHT AT BEDTIME    triamcinolone (KENALOG) 0.1 % cream   Yes No    Apply 1 Application topically to the appropriate area as directed 2 (Two) Times a Day.    VITAMIN D, CHOLECALCIFEROL, PO   Yes No    Take  by mouth.              Vital Signs:  Temp:  [97.7 °F (36.5 °C)] 97.7 °F (36.5 °C)  Heart Rate:  [102-203] 103  Resp:  [16-18] 16  BP: (128-154)/(80-96) 150/87        12/21/24 2233   Weight: 87.8 kg (193 lb 9 oz)     Body mass index is 30.77 kg/m².    Physical Exam:     Most recent vital Signs: /87 (BP Location: Left arm, Patient Position: Lying)   Pulse 103   Temp 97.7 °F (36.5 °C) (Oral) Comment: Simultaneous filing. User may be unaware of other data. Comment (Src): Simultaneous filing. User may be unaware of other data.  Resp 16   Ht 168.9 cm (66.5\")   Wt 87.8 kg (193 lb 9 oz)   SpO2 98%   BMI 30.77 kg/m²     Constitutional: Awake, alert  Eyes: PERRLA, sclerae anicteric, no conjunctival injection  HENT: NCAT, mucous membranes moist  Neck: Supple, no thyromegaly, no lymphadenopathy, trachea midline  Respiratory: Basilar Rales bilaterally, nonlabored respirations   Cardiovascular: RRR, no murmurs, rubs, or gallops, palpable pedal pulses bilaterally  Gastrointestinal: Positive bowel sounds, soft, nontender, nondistended  Musculoskeletal: +1 bilateral ankle edema, no clubbing or cyanosis to extremities  Psychiatric: Appropriate affect, cooperative  Neurologic: Oriented x 3, speech clear  Skin: hemosiderosis of the shins  Edited by: Emery Guzman, DO at " "12/21/2024 2255      Laboratory data:    I have reviewed the labs done in the emergency room.    Results from last 7 days   Lab Units 12/21/24  1855   SODIUM mmol/L 138   POTASSIUM mmol/L 4.1   CHLORIDE mmol/L 101   CO2 mmol/L 23.0   BUN mg/dL 30*   CREATININE mg/dL 1.46*   CALCIUM mg/dL 8.8   BILIRUBIN mg/dL 1.5*   ALK PHOS U/L 103   ALT (SGPT) U/L 11   AST (SGOT) U/L 21   GLUCOSE mg/dL 232*     Results from last 7 days   Lab Units 12/21/24  1855   WBC 10*3/mm3 6.49   HEMOGLOBIN g/dL 14.1   HEMATOCRIT % 43.3   PLATELETS 10*3/mm3 108*         Results from last 7 days   Lab Units 12/21/24 2000 12/21/24  1855   HSTROP T ng/L 31* 37*     Results from last 7 days   Lab Units 12/21/24  1855   PROBNP pg/mL 8,775.0*                       Invalid input(s): \"USDES\", \"NITRITITE\", \"BACT\", \"EP\"    Pain Management Panel  More data exists         4/4/2024 12/29/2022   Pain Management Panel   Creatinine, Urine 10  72.7       Details                   EKG:      EKG shows a regular narrow complex tachycardia with a rate of about 175 associated interventricular conduction delay nonspecific ST and T wave changes, initial EKG shows, sinus rhythm with a right bundle branch block with nonspecific T wave changes prominent U waves. Review of telemetry shows Wide complex at baseline which flipped axis during his episode of tachycardia    Radiology:    CT Angiogram Chest Pulmonary Embolism    Result Date: 12/21/2024  FINAL REPORT TECHNIQUE: null CLINICAL HISTORY: Shortness of breath x 1 week, recent respiratory illness 2 months ago, history of PE, eval PE COMPARISON: null FINDINGS: Exam: CTA Chest with IV contrast. Procedure: Contrast was administered. Coronal and sagittal MIP reformats were performed Comparison: 11/21/2024 Clinical history: Shortness of breath x1 week. Recent respiratory illness 2 months ago. History of PE. Eval PE Findings: Evaluation for PE in the subsegmental arteries of the lower lobes bilaterally is limited due to " suboptimal opacification. No pulmonary emboli identified elsewhere in the chest. Prior median sternotomy and CABG. Ascending thoracic aortic aneurysm measuring 4.4 cm is stable compared to 11/21/2024 No thoracic aortic aneurysm. No hilar or mediastinal adenopathy. Cardiomegaly No pericardial fluid collection. No pneumothorax. Small bilateral pleural fluid collections are similar to prior exam. Bilateral lower lobe consolidation may represent atelectasis or pneumonia. Nodular cirrhotic appearing liver, unchanged . Visualized liver, spleen and pancreas do not demonstrate any acute process. No gallstones. No thoracic spine compression fractures     Impression: 1. Evaluation for PE in the subsegmental arteries of the lower lobes bilaterally is limited due to suboptimal opacification. No pulmonary emboli identified elsewhere in the chest. 2. Ascending thoracic aortic aneurysm is unchanged in size compared to prior exam. No contrast within the aorta. 3. Stable small bilateral pleural fluid collections. Bilateral lower lobe consolidation may represent pneumonia or atelectasis. 4. Nodular cirrhotic appearing liver, unchanged. Authenticated and Electronically Signed by Leatha Velasco MD on 12/21/2024 08:08:28 PM     Assessment/Plan:      Atrial arrhythmia (possibly ventricular also)  -- Paroxysmal.  Patient with a history of atrial fibrillation on amiodarone.  EKG here consistent with a single episode of atrial flutter.  Although diuresis may help some with this, he may eventually need ablation or switch to other AAD.  Family did not feel that can safely take him home after he had this episode in the ED.  If no further episodes I feel he can be discharged tomorrow morning. Concern as well for possible ventricular arrhythmia as he flipped his axis during episode of rapid heart rate.  -- Active Treatments: Continue amiodarone, metoprolol scheduled and prn  -- Pending Results: A.m. labs, cardiology consult, PT/OT      Chronic  HFrEF (heart failure with reduced ejection fraction)  --Does not appear significantly exacerbated, CT imaging stable, BNP stable.    -- Active Treatments: Cautiously diurese  -- Pending Results: Monitor creatinine closely      Type 2 diabetes mellitus with hyperglycemia, with long-term current use of insulin    Stage 3b chronic kidney disease    Valvular heart disease  -- Chronic medical problems, would benefit from resuming anticoagulation if possible  -- Active Treatments: Subcutaneous insulin,               DVT Prophylaxis: Lovenox  Code Status: Full code  Diet: Cardiac diabetic renal  Risk assessment: Moderate anticipate less than two night stay          Edited by: Emery Guzman DO at 12/21/2024 3437           Advance Care Planning  ACP discussion was held with the patient during this visit. Patient has an advance directive in EMR which is still valid.            Emery Guzman DO  12/21/24  22:58 EST    Dictated utilizing Dragon dictation.      Electronically signed by Emery Guzman DO at 12/21/24 4014       Vital Signs (last 2 days)       Date/Time Temp Temp src Pulse Resp BP Patient Position SpO2    12/22/24 0857 -- -- -- -- 101/60 -- --    12/22/24 0700 97.4 (36.3) Axillary -- 22 101/60 Lying 100    12/22/24 0410 97.4 (36.3) Axillary 89 22 115/71 Lying 98    12/21/24 2233 -- -- 103 16 150/87 Lying 98    12/21/24 2139 -- -- 109 -- -- -- --    12/21/24 2131 -- -- 117 -- -- -- 93    12/21/24 2130 -- -- 126 -- -- -- 95    12/21/24 2129 -- -- 203 -- 154/96 -- 93    12/21/24 2030 -- -- 102 -- 128/82 -- 93    12/21/24 2000 -- -- 102 -- 128/80 -- 96    12/21/24 1858 97.7 (36.5)  Oral  105 18 144/90 -- 94    Temp: Simultaneous filing. User may be unaware of other data. at 12/21/24 1858    Temp src: Simultaneous filing. User may be unaware of other data. at 12/21/24 1858 12/21/24 1853 -- -- 105 -- 144/90 -- 93          Facility-Administered Medications as of 12/22/2024   Medication Dose Route  Frequency Provider Last Rate Last Admin    acetaminophen (TYLENOL) tablet 650 mg  650 mg Oral Q4H PRN Emery Guzman DO        allopurinol (ZYLOPRIM) tablet 100 mg  100 mg Oral Daily Emery Guzman DO   100 mg at 12/22/24 0921    amiodarone (PACERONE) tablet 200 mg  200 mg Oral Daily Emery Guzman DO   200 mg at 12/22/24 0921    ascorbic acid (VITAMIN C) tablet 1,000 mg  1,000 mg Oral Daily Emery Guzman DO   1,000 mg at 12/22/24 0921    sennosides-docusate (PERICOLACE) 8.6-50 MG per tablet 2 tablet  2 tablet Oral BID PRN Emery Guzman DO        And    polyethylene glycol (MIRALAX) packet 17 g  17 g Oral Daily PRN Emery Guzman DO        And    bisacodyl (DULCOLAX) EC tablet 5 mg  5 mg Oral Daily PRN Emery Guzman DO        And    bisacodyl (DULCOLAX) suppository 10 mg  10 mg Rectal Daily PRN Emery Guzman DO        [START ON 12/23/2024] Calcium Carb-Cholecalciferol 500-5 MG-MCG per tablet 1 tablet  1 tablet Oral Once per day on Monday Wednesday Friday Emery Guzman DO        calcium carbonate (TUMS) chewable tablet 500 mg (200 mg elemental)  2 tablet Oral BID PRN Emery Guzman DO        Calcium Replacement - Follow Nurse / BPA Driven Protocol   Not Applicable PRN Emery Guzman DO        dextrose (D50W) (25 g/50 mL) IV injection 10-50 mL  10-50 mL Intravenous Q15 Min PRN Emery Guzman DO        dextrose (GLUTOSE) oral gel 15 g  15 g Oral Q15 Min PRN Emery Guzman DO        empagliflozin (JARDIANCE) tablet 25 mg  25 mg Oral Daily Emery Guzman DO   25 mg at 12/22/24 0921    Enoxaparin Sodium (LOVENOX) syringe 90 mg  1 mg/kg Subcutaneous Q12H Emery Guzman DO   90 mg at 12/21/24 2324    fluticasone (FLONASE) 50 MCG/ACT nasal spray 2 spray  2 spray Nasal Daily PRN Emery Guzman DO        [COMPLETED] furosemide (LASIX) injection 80 mg  80 mg Intravenous Once Franko Ornelas MD   80 mg at 12/21/24 2000     furosemide (LASIX) injection 80 mg  80 mg Intravenous BID Emery uGzman, DO   80 mg at 12/22/24 0921    Glucagon (GLUCAGEN) injection 1 mg  1 mg Intramuscular Q15 Min PRN Emery Guzman DO        insulin glargine (LANTUS, SEMGLEE) injection 1-200 Units  1-200 Units Subcutaneous Nightly - Glucommander Emery Guzman, DO   12 Units at 12/21/24 2323    insulin lispro (humaLOG) injection 1-200 Units  1-200 Units Subcutaneous 4x Daily With Meals & Nightly Emery Guzman DO   2 Units at 12/21/24 2323    insulin lispro (humaLOG) injection 1-200 Units  1-200 Units Subcutaneous PRN Emery Guzman DO        [COMPLETED] iopamidol (ISOVUE-300) 61 % injection 85 mL  85 mL Intravenous Once in imaging Franko Ornelas MD   85 mL at 12/21/24 1942    Magnesium Standard Dose Replacement - Follow Nurse / BPA Driven Protocol   Not Applicable PRN Emery Guzman DO        metoprolol succinate XL (TOPROL-XL) 24 hr tablet 12.5 mg  12.5 mg Oral Daily Emery Guzman DO   12.5 mg at 12/22/24 0921    metoprolol tartrate (LOPRESSOR) injection 5 mg  5 mg Intravenous Q4H PRN Emery Guzman DO        [COMPLETED] metoprolol tartrate (LOPRESSOR) tablet 25 mg  25 mg Oral Once Franko Ornelas MD   25 mg at 12/21/24 2139    multivitamin with minerals 1 tablet  1 tablet Oral Daily Emery Guzman DO   1 tablet at 12/22/24 0921    nitroglycerin (NITROSTAT) SL tablet 0.4 mg  0.4 mg Sublingual Q5 Min PRN Emery Guzman DO        ondansetron ODT (ZOFRAN-ODT) disintegrating tablet 4 mg  4 mg Oral Q6H PRN Emery Guzman DO        Or    ondansetron (ZOFRAN) injection 4 mg  4 mg Intravenous Q6H PRN Emery Guzman DO        Pharmacy to Dose enoxaparin (LOVENOX)   Not Applicable Continuous PRN Emery Guzman DO        Phosphorus Replacement - Follow Nurse / BPA Driven Protocol   Not Applicable PRN Emery Guzman, DO        Potassium Replacement - Follow Nurse / BPA Driven  Protocol   Not Applicable PRN Emery Guzman, DO        pravastatin (PRAVACHOL) tablet 40 mg  40 mg Oral Daily Emery Guzman, DO   40 mg at 12/22/24 0921    sacubitril-valsartan (ENTRESTO) 24-26 MG tablet 1 tablet  1 tablet Oral Q12H Emery Guzman, DO   1 tablet at 12/22/24 0921    sodium chloride 0.9 % flush 10 mL  10 mL Intravenous Q12H Emery Guzmanith, DO   10 mL at 12/22/24 0922    sodium chloride 0.9 % flush 10 mL  10 mL Intravenous PRN Emery Guzman, DO        sodium chloride 0.9 % infusion 40 mL  40 mL Intravenous PRN Emery Guzman, DO         Lab Results (last 48 hours)       Procedure Component Value Units Date/Time    POC Glucose 4x Daily Before Meals & at Bedtime [[id number]]  (Normal) Collected: 12/22/24 0715    Specimen: Blood Updated: 12/22/24 0727     Glucose 104 mg/dL      Comment: Serial Number: [id number]:  [id number]       Comprehensive Metabolic Panel [[id number]]  (Abnormal) Collected: 12/22/24 0609    Specimen: Blood Updated: 12/22/24 0639     Glucose 122 mg/dL      BUN 30 mg/dL      Creatinine 1.46 mg/dL      Sodium 143 mmol/L      Potassium 4.4 mmol/L      Chloride 107 mmol/L      CO2 28.4 mmol/L      Calcium 8.9 mg/dL      Total Protein 6.4 g/dL      Albumin 3.8 g/dL      ALT (SGPT) 10 U/L      AST (SGOT) 19 U/L      Alkaline Phosphatase 96 U/L      Total Bilirubin 1.1 mg/dL      Globulin 2.6 gm/dL      A/G Ratio 1.5 g/dL      BUN/Creatinine Ratio 20.5     Anion Gap 7.6 mmol/L      eGFR 46.3 mL/min/1.73     Narrative:      GFR Categories in Chronic Kidney Disease (CKD)      GFR Category          GFR (mL/min/1.73)    Interpretation  G1                     90 or greater         Normal or high (1)  G2                      60-89                Mild decrease (1)  G3a                   45-59                Mild to moderate decrease  G3b                   30-44                Moderate to severe decrease  G4                    15-29                Severe  decrease  G5                    14 or less           Kidney failure          (1)In the absence of evidence of kidney disease, neither GFR category G1 or G2 fulfill the criteria for CKD.    eGFR calculation 2021 CKD-EPI creatinine equation, which does not include race as a factor    CBC (No Diff) [[id number]]  (Abnormal) Collected: 12/22/24 0609    Specimen: Blood Updated: 12/22/24 0625     WBC 6.02 10*3/mm3      RBC 4.37 10*6/mm3      Hemoglobin 13.2 g/dL      Hematocrit 41.3 %      MCV 94.5 fL      MCH 30.2 pg      MCHC 32.0 g/dL      RDW 15.6 %      RDW-SD 53.7 fl      MPV 10.6 fL      Platelets 105 10*3/mm3     POC Glucose Once [[id number]]  (Abnormal) Collected: 12/21/24 2244    Specimen: Blood Updated: 12/22/24 0008     Glucose 274 mg/dL      Comment: Serial Number: [id number]:  [id number]       TSH [[id number]]  (Normal) Collected: 12/21/24 2000    Specimen: Blood Updated: 12/21/24 2207     TSH 2.090 uIU/mL     T4, Free [[id number]]  (Normal) Collected: 12/21/24 2000    Specimen: Blood Updated: 12/21/24 2207     Free T4 1.61 ng/dL     High Sensitivity Troponin T 1Hr [[id number]]  (Abnormal) Collected: 12/21/24 2000    Specimen: Blood Updated: 12/21/24 2029     HS Troponin T 31 ng/L      Troponin T Numeric Delta -6 ng/L      Troponin T % Delta -16 %     Narrative:      High Sensitive Troponin T Reference Range:  <14.0 ng/L- Negative Female for AMI  <22.0 ng/L- Negative Male for AMI  >=14 - Abnormal Female indicating possible myocardial injury.  >=22 - Abnormal Male indicating possible myocardial injury.   Clinicians would have to utilize clinical acumen, EKG, Troponin, and serial changes to determine if it is an Acute Myocardial Infarction or myocardial injury due to an underlying chronic condition.         BNP [[id number]]  (Abnormal) Collected: 12/21/24 1855    Specimen: Blood Updated: 12/21/24 1950     proBNP 8,775.0 pg/mL     Narrative:      This assay is used as an aid in the diagnosis of individuals  "suspected of having heart failure. It can be used as an aid in the diagnosis of acute decompensated heart failure (ADHF) in patients presenting with signs and symptoms of ADHF to the emergency department (ED). In addition, NT-proBNP of <300 pg/mL indicates ADHF is not likely.    Age Range Result Interpretation  NT-proBNP Concentration (pg/mL:      <50             Positive            >450                   Gray                 300-450                    Negative             <300    50-75           Positive            >900                  Gray                300-900                  Negative            <300      >75             Positive            >1800                  Gray                300-1800                  Negative            <300    COVID-19 and FLU A/B PCR, 1 HR TAT - Swab, Nasopharynx [[id number]]  (Normal) Collected: 12/21/24 1912    Specimen: Swab from Nasopharynx Updated: [birth year]1937     COVID19 Not Detected     Influenza A PCR Not Detected     Influenza B PCR Not Detected    Narrative:      Fact sheet for providers: https://www.fda.gov/media/048563/download    Fact sheet for patients: https://www.fda.gov/media/062217/download    Test performed by PCR.    Procalcitonin [[id number]]  (Normal) Collected: 12/21/24 1855    Specimen: Blood Updated: 12/21/24 1928     Procalcitonin 0.08 ng/mL     Narrative:      As a Marker for Sepsis (Non-Neonates):    1. <0.5 ng/mL represents a low risk of severe sepsis and/or septic shock.  2. >2 ng/mL represents a high risk of severe sepsis and/or septic shock.    As a Marker for Lower Respiratory Tract Infections that require antibiotic therapy:    PCT on Admission    Antibiotic Therapy       6-12 Hrs later    >0.5                Strongly Recommended  >0.25 - <0.5        Recommended   0.1 - 0.25          Discouraged              Remeasure/reassess PCT  <0.1                Strongly Discouraged     Remeasure/reassess PCT    As 28 day mortality risk marker: \"Change in " "Procalcitonin Result\" (>80% or <=80%) if Day 0 (or Day 1) and Day 4 values are available. Refer to [email or web address]    Change in PCT <=80%  A decrease of PCT levels below or equal to 80% defines a positive change in PCT test result representing a higher risk for 28-day all-cause mortality of patients diagnosed with severe sepsis for septic shock.    Change in PCT >80%  A decrease of PCT levels of more than 80% defines a negative change in PCT result representing a lower risk for 28-day all-cause mortality of patients diagnosed with severe sepsis or septic shock.       High Sensitivity Troponin T [[id number]]  (Abnormal) Collected: 12/21/24 1855    Specimen: Blood Updated: 12/21/24 1925     HS Troponin T 37 ng/L     Narrative:      High Sensitive Troponin T Reference Range:  <14.0 ng/L- Negative Female for AMI  <22.0 ng/L- Negative Male for AMI  >=14 - Abnormal Female indicating possible myocardial injury.  >=22 - Abnormal Male indicating possible myocardial injury.   Clinicians would have to utilize clinical acumen, EKG, Troponin, and serial changes to determine if it is an Acute Myocardial Infarction or myocardial injury due to an underlying chronic condition.         C-reactive Protein [[id number]]  (Normal) Collected: 12/21/24 1855    Specimen: Blood Updated: 12/21/24 1925     C-Reactive Protein <0.30 mg/dL     Comprehensive Metabolic Panel [[id number]]  (Abnormal) Collected: 12/21/24 1855    Specimen: Blood Updated: 12/21/24 1923     Glucose 232 mg/dL      Comment: Glucose >180, Hemoglobin A1C recommended.        BUN 30 mg/dL      Creatinine 1.46 mg/dL      Sodium 138 mmol/L      Potassium 4.1 mmol/L      Chloride 101 mmol/L      CO2 23.0 mmol/L      Calcium 8.8 mg/dL      Total Protein 6.9 g/dL      Albumin 4.1 g/dL      ALT (SGPT) 11 U/L      AST (SGOT) 21 U/L      Alkaline Phosphatase 103 U/L      Total Bilirubin 1.5 mg/dL      Globulin 2.8 gm/dL      A/G Ratio 1.5 g/dL      BUN/Creatinine " Ratio 20.5     Anion Gap 14.0 mmol/L      eGFR 46.3 mL/min/1.73     Narrative:      GFR Categories in Chronic Kidney Disease (CKD)      GFR Category          GFR (mL/min/1.73)    Interpretation  G1                     90 or greater         Normal or high (1)  G2                      60-89                Mild decrease (1)  G3a                   45-59                Mild to moderate decrease  G3b                   30-44                Moderate to severe decrease  G4                    15-29                Severe decrease  G5                    14 or less           Kidney failure          (1)In the absence of evidence of kidney disease, neither GFR category G1 or G2 fulfill the criteria for CKD.    eGFR calculation 2021 CKD-EPI creatinine equation, which does not include race as a factor    Magnesium [[id number]]  (Normal) Collected: 12/21/24 1855    Specimen: Blood Updated: 12/21/24 1923     Magnesium 2.2 mg/dL     CBC & Differential [[id number]]  (Abnormal) Collected: 12/21/24 1855    Specimen: Blood Updated: 12/21/24 1906    Narrative:      The following orders were created for panel order CBC & Differential.  Procedure                               Abnormality         Status                     ---------                               -----------         ------                     CBC Auto Differential[[id number]]        Abnormal            Final result               Scan Slide[[id number]]                                                                    Please view results for these tests on the individual orders.    CBC Auto Differential [[id number]]  (Abnormal) Collected: 12/21/24 1855    Specimen: Blood Updated: 12/21/24 1906     WBC 6.49 10*3/mm3      RBC 4.60 10*6/mm3      Hemoglobin 14.1 g/dL      Hematocrit 43.3 %      MCV 94.1 fL      MCH 30.7 pg      MCHC 32.6 g/dL      RDW 15.7 %      RDW-SD 53.9 fl      MPV 10.7 fL      Platelets 108 10*3/mm3      Neutrophil % 65.6 %      Lymphocyte % 19.1 %      Monocyte %  8.8 %      Eosinophil % 3.9 %      Basophil % 1.2 %      Immature Grans % 1.4 %      Neutrophils, Absolute 4.26 10*3/mm3      Lymphocytes, Absolute 1.24 10*3/mm3      Monocytes, Absolute 0.57 10*3/mm3      Eosinophils, Absolute 0.25 10*3/mm3      Basophils, Absolute 0.08 10*3/mm3      Immature Grans, Absolute 0.09 10*3/mm3      nRBC 0.0 /100 WBC           Imaging Results (Last 48 Hours)       Procedure Component Value Units Date/Time    CT Angiogram Chest Pulmonary Embolism [[id number]] Collected: 12/21/24 2008     Updated: 12/21/24 2009    Narrative:      FINAL REPORT    TECHNIQUE:  null    CLINICAL HISTORY:  Shortness of breath x 1 week, recent respiratory illness 2  months ago, history of PE, eval PE    COMPARISON:  null    FINDINGS:  Exam: CTA Chest with IV contrast.    Procedure: Contrast was administered. Coronal and sagittal MIP reformats were performed    Comparison: 11/21/2024    Clinical history: Shortness of breath x1 week. Recent respiratory illness 2 months ago. History of PE. Eval PE    Findings:    Evaluation for PE in the subsegmental arteries of the lower lobes bilaterally is limited due to suboptimal opacification. No pulmonary emboli identified elsewhere in the chest.    Prior median sternotomy and CABG.    Ascending thoracic aortic aneurysm measuring 4.4 cm is stable compared to 11/21/2024    No thoracic aortic aneurysm.    No hilar or mediastinal adenopathy.    Cardiomegaly    No pericardial fluid collection.    No pneumothorax.    Small bilateral pleural fluid collections are similar to prior exam.    Bilateral lower lobe consolidation may represent atelectasis or pneumonia.    Nodular cirrhotic appearing liver, unchanged .    Visualized liver, spleen and pancreas do not demonstrate any acute process.    No gallstones.    No thoracic spine compression fractures      Impression:      Impression:    1. Evaluation for PE in the subsegmental arteries of the lower lobes bilaterally is limited due to  suboptimal opacification. No pulmonary emboli identified elsewhere in the chest.    2. Ascending thoracic aortic aneurysm is unchanged in size compared to prior exam. No contrast within the aorta.    3. Stable small bilateral pleural fluid collections. Bilateral lower lobe consolidation may represent pneumonia or atelectasis.    4. Nodular cirrhotic appearing liver, unchanged.    Authenticated and Electronically Signed by Leatha Velasco MD  on 12/21/2024 08:08:28 PM    XR Chest 1 View [[id number]] Resulted: 12/21/24 1931     Updated: 12/21/24 1931          ECG/EMG Results (last 48 hours)       Procedure Component Value Units Date/Time    ECG 12 Lead Dyspnea [[id number]] Resulted: 12/21/24 1900     Updated: 12/21/24 1901    ECG 12 Lead Rhythm Change [[id number]] Resulted: 12/21/24 2133     Updated: 12/21/24 2134    Adult Transthoracic Echo Complete W/ Cont if Necessary Per Protocol [[id number]] Resulted: 12/22/24 0904     Updated: 12/22/24 0904     EF(MOD-bp) 38.2 %      LVIDd 5.2 cm      LVIDs 4.4 cm      IVSd 1.26 cm      LVPWd 1.25 cm      FS 14.3 %      IVS/LVPW 1.01 cm      ESV(cubed) 86.4 ml      LV Sys Vol (BSA corrected) 36.1 cm2      EDV(cubed) 137.4 ml      LV Perez Vol (BSA corrected) 57.6 cm2      LV mass(C)d 261.7 grams      LVOT area 5.4 cm2      LVOT diam 2.6 cm      EDV(MOD-sp2) 109.0 ml      EDV(MOD-sp4) 113.0 ml      ESV(MOD-sp2) 65.9 ml      ESV(MOD-sp4) 70.8 ml      SV(MOD-sp2) 43.1 ml      SV(MOD-sp4) 42.2 ml      SVi(MOD-SP2) 22.0 ml/m2      SVi(MOD-SP4) 21.5 ml/m2      SVi (LVOT) 32.1 ml/m2      EF(MOD-sp2) 39.5 %      EF(MOD-sp4) 37.3 %      MV E max morgan 109.0 cm/sec      MV dec time 0.15 sec      LA ESV Index (BP) 25.5 ml/m2      Med Peak E' Morgan 4.5 cm/sec      Lat Peak E' Morgan 10.6 cm/sec      TR max morgan 251.5 cm/sec      Avg E/e' ratio 14.44     SV(LVOT) 63.0 ml      RV Base 4.2 cm      RV Mid 4.3 cm      RV Length 8.4 cm      TAPSE (>1.6) 1.07 cm      RV S' 8.4 cm/sec      LA dimension (2D)   5.4 cm      LV V1 max 62.5 cm/sec      LV V1 max PG 1.56 mmHg      LV V1 mean PG 1.00 mmHg      LV V1 VTI 11.6 cm      Ao pk myrtle 78.9 cm/sec      Ao max PG 2.6 mmHg      Ao mean PG 1.50 mmHg      Ao V2 VTI 13.5 cm      CAMILO(I,D) 4.7 cm2      MV max PG 4.4 mmHg      MV mean PG 2.00 mmHg      MV V2 VTI 14.5 cm      MVA(VTI) 4.3 cm2      MV dec slope 734.0 cm/sec2      TR max PG 25.3 mmHg      RVSP(TR) 33.3 mmHg      RAP systole 8.0 mmHg      RV V1 max PG 2.04 mmHg      RV V1 max 71.5 cm/sec      RV V1 VTI 11.5 cm      PA acc time 0.16 sec      Ao root diam 3.5 cm     Narrative:        Left ventricular systolic function is moderately decreased. Calculated   left ventricular EF = 38.2% Left ventricular ejection fraction appears to   be 36 - 40%.    The left ventricular cavity is mildly dilated.    Left ventricular diastolic dysfunction is noted.    Mildly reduced right ventricular systolic function noted.    The right ventricular cavity is mildly dilated.    The left atrial cavity is dilated.    The right atrial cavity is dilated.    Moderate aortic valve regurgitation is present.  Patient has a history   of Ross procedure.    Moderate mitral valve regurgitation is present.    Estimated right ventricular systolic pressure from tricuspid   regurgitation is normal (<35 mmHg).            Orders (last 48 hrs)        Start     Ordered    12/23/24 0900  Calcium Carb-Cholecalciferol 500-5 MG-MCG per tablet 1 tablet  Once per day on Monday Wednesday Friday 12/21/24 2332 12/22/24 0937  ECG 12 Lead Rhythm Change  Once         12/22/24 0937    12/22/24 0900  allopurinol (ZYLOPRIM) tablet 100 mg  Daily         12/21/24 2201 12/22/24 0900  amiodarone (PACERONE) tablet 200 mg  Daily         12/21/24 2201 12/22/24 0900  ascorbic acid (VITAMIN C) tablet 1,000 mg  Daily         12/21/24 2201 12/22/24 0900  empagliflozin (JARDIANCE) tablet 25 mg  Daily         12/21/24 2201 12/22/24 0900  metoprolol succinate XL  (TOPROL-XL) 24 hr tablet 12.5 mg  Daily         12/21/24 2201 12/22/24 0900  multivitamin with minerals 1 tablet  Daily         12/21/24 2201 12/22/24 0900  pravastatin (PRAVACHOL) tablet 40 mg  Daily         12/21/24 2201 12/22/24 0900  furosemide (LASIX) injection 80 mg  2 Times Daily         12/21/24 2201 12/22/24 0800  Oral Care  2 Times Daily       12/21/24 2201 12/22/24 0702  Inpatient Cardiology Consult  IN AM        Specialty:  Cardiology  Provider:  Danny Yun MD    12/21/24 2201 12/22/24 0700  POC Glucose 4x Daily Before Meals & at Bedtime  4 Times Daily Before Meals & at Bedtime      Comments: Complete no more than 45 minutes prior to patient eating      12/21/24 2201 12/22/24 0600  CBC (No Diff)  Morning Draw         12/21/24 2201 12/22/24 0600  Comprehensive Metabolic Panel  Morning Draw         12/21/24 2201 12/22/24 0009  POC Glucose Once  PROCEDURE ONCE        Comments: Complete no more than 45 minutes prior to patient eating      12/21/24 2244 12/22/24 0000  Strict Intake & Output  Every 4 Hours       12/21/24 2201 12/22/24 0000  Vital Signs  Every 4 Hours       12/21/24 2201 12/21/24 2345  Enoxaparin Sodium (LOVENOX) syringe 90 mg  Every 12 Hours         12/21/24 2252 12/21/24 2333  NIPPV-IPPV / BIPAP / CPAP  At Bedtime & As Needed-RT         12/21/24 2332 12/21/24 2332  NIPPV (CPAP or BIPAP)  Until Discontinued,   Status:  Canceled         12/21/24 2332 12/21/24 2330  Enoxaparin Sodium (LOVENOX) syringe 40 mg  Nightly,   Status:  Discontinued         12/21/24 2241 12/21/24 2253  Adult Transthoracic Echo Complete W/ Cont if Necessary Per Protocol  Once         12/21/24 2252 12/21/24 2248  metoprolol tartrate (LOPRESSOR) injection 5 mg  Every 4 Hours PRN         12/21/24 2249 12/21/24 2246  Pharmacy to Dose enoxaparin (LOVENOX)  Continuous PRN         12/21/24 2247 12/21/24 2218  Critical Care  Once        Comments: This order was created  via procedure documentation    12/21/24 2217 12/21/24 2217  Calcium Carb-Cholecalciferol 500-5 MG-MCG per tablet 1 tablet  Every Other Day,   Status:  Discontinued         12/21/24 2201 12/21/24 2217  sacubitril-valsartan (ENTRESTO) 24-26 MG tablet 1 tablet  Every 12 Hours         12/21/24 2201 12/21/24 2217  sodium chloride 0.9 % flush 10 mL  Every 12 Hours Scheduled         12/21/24 2201 12/21/24 2217  insulin glargine (LANTUS, SEMGLEE) injection 1-200 Units  Nightly - Glucommander         12/21/24 2201 12/21/24 2217  insulin lispro (humaLOG) injection 1-200 Units  4 Times Daily With Meals & Nightly         12/21/24 2201 12/21/24 2202  Daily Weights  Daily       12/21/24 2201 12/21/24 2202  Vital Signs  Per Hospital Policy         12/21/24 2201 12/21/24 2202  Continuous Cardiac Monitoring  Continuous        Comments: Follow Standing Orders As Outlined in Process Instructions (Open Order Report to View Full Instructions)    12/21/24 2201 12/21/24 2202  Maintain IV Access  Continuous,   Status:  Canceled         12/21/24 2201 12/21/24 2202  Telemetry - Place Orders & Notify Provider of Results When Patient Experiences Acute Chest Pain, Dysrhythmia or Respiratory Distress  Continuous        Comments: Open Order Report to View Parameters Requiring Provider Notification    12/21/24 2201 12/21/24 2202  May Be Off Telemetry for Tests  Continuous         12/21/24 2201 12/21/24 2202  Intake & Output  Every Shift       12/21/24 2201 12/21/24 2202  Weigh Patient  Once         12/21/24 2201 12/21/24 2202  Insert Peripheral IV  Once         12/21/24 2201 12/21/24 2202  Saline Lock & Maintain IV Access  Continuous         12/21/24 2201 12/21/24 2202  Initiate Observation Status  Once         12/21/24 2201 12/21/24 2202  Code Status and Medical Interventions: CPR (Attempt to Resuscitate); Full Support  Continuous         12/21/24 2201 12/21/24 2202  Diet: Cardiac, Diabetic,  "Renal; Healthy Heart (2-3 Na+); Consistent Carbohydrate; Low Sodium (2-3g), Low Potassium, Low Phosphorus; Fluid Consistency: Thin (IDDSI 0)  Diet Effective Now,   Status:  Canceled         12/21/24 2201 12/21/24 2202  Initiate Glucommander™ SQ  Once         12/21/24 2201 12/21/24 2202  Patient is on Glucommander  Continuous         12/21/24 2201 12/21/24 2202  RN to Order STAT Glucose (Lab Performed) for POC Glucose <10 or >600  Continuous        Comments: Do NOT Delay Treatment of Unstable Patient to Obtain Glucose Sample From Lab  Notify Provider of Results    12/21/24 2201 12/21/24 2202  Diet: Cardiac, Diabetic, Renal; Healthy Heart (2-3 Na+); Consistent Carbohydrate; Low Sodium (2-3g), Low Potassium, Low Phosphorus; Fluid Consistency: Thin (IDDSI 0)  Diet Effective Now         12/21/24 2201 12/21/24 2202  OT Consult: Eval & Treat ADL Performance Below Baseline, Discharge Placement Assessment  Once         12/21/24 2201 12/21/24 2202  PT Consult: Eval & Treat Discharge Placement Assessment, Functional Mobility Below Baseline  Once         12/21/24 2201 12/21/24 2202  ED Bed Request  Once         12/21/24 2201 12/21/24 2201  nitroglycerin (NITROSTAT) SL tablet 0.4 mg  Every 5 Minutes PRN         12/21/24 2201 12/21/24 2201  sodium chloride 0.9 % flush 10 mL  As Needed         12/21/24 2201 12/21/24 2201  sodium chloride 0.9 % infusion 40 mL  As Needed         12/21/24 2201 12/21/24 2201  acetaminophen (TYLENOL) tablet 650 mg  Every 4 Hours PRN         12/21/24 2201 12/21/24 2201  calcium carbonate (TUMS) chewable tablet 500 mg (200 mg elemental)  2 Times Daily PRN         12/21/24 2201 12/21/24 2201  sennosides-docusate (PERICOLACE) 8.6-50 MG per tablet 2 tablet  2 Times Daily PRN        Placed in \"And\" Linked Group    12/21/24 2201 12/21/24 2201  polyethylene glycol (MIRALAX) packet 17 g  Daily PRN        Placed in \"And\" Linked Group    12/21/24 2201 12/21/24 2201  " "bisacodyl (DULCOLAX) EC tablet 5 mg  Daily PRN        Placed in \"And\" Linked Group    12/21/24 2201 12/21/24 2201  bisacodyl (DULCOLAX) suppository 10 mg  Daily PRN        Placed in \"And\" Linked Group    12/21/24 2201 12/21/24 2201  ondansetron ODT (ZOFRAN-ODT) disintegrating tablet 4 mg  Every 6 Hours PRN        Placed in \"Or\" Linked Group    12/21/24 2201 12/21/24 2201  ondansetron (ZOFRAN) injection 4 mg  Every 6 Hours PRN        Placed in \"Or\" Linked Group    12/21/24 2201 12/21/24 2201  Pharmacy to Dose enoxaparin (LOVENOX)  Continuous PRN,   Status:  Discontinued         12/21/24 2201 12/21/24 2201  insulin lispro (humaLOG) injection 1-200 Units  As Needed         12/21/24 2201 12/21/24 2201  dextrose (GLUTOSE) oral gel 15 g  Every 15 Minutes PRN         12/21/24 2201 12/21/24 2201  dextrose (D50W) (25 g/50 mL) IV injection 10-50 mL  Every 15 Minutes PRN         12/21/24 2201 12/21/24 2201  Glucagon (GLUCAGEN) injection 1 mg  Every 15 Minutes PRN         12/21/24 2201 12/21/24 2201  Potassium Replacement - Follow Nurse / BPA Driven Protocol  As Needed         12/21/24 2201 12/21/24 2201  Magnesium Standard Dose Replacement - Follow Nurse / BPA Driven Protocol  As Needed         12/21/24 2201 12/21/24 2201  Phosphorus Replacement - Follow Nurse / BPA Driven Protocol  As Needed         12/21/24 2201 12/21/24 2201  Calcium Replacement - Follow Nurse / BPA Driven Protocol  As Needed         12/21/24 2201 12/21/24 2201  fluticasone (FLONASE) 50 MCG/ACT nasal spray 2 spray  Daily PRN         12/21/24 2201 12/21/24 2200  Initiate Observation Status  Once         12/21/24 2201 12/21/24 2149  metoprolol tartrate (LOPRESSOR) tablet 25 mg  Once         12/21/24 2133 12/21/24 2144  T4, Free  Once         12/21/24 2143 12/21/24 2143  TSH  Once         12/21/24 2142 12/21/24 2125  ECG 12 Lead Rhythm Change  Once         12/21/24 2132 12/21/24 2005  furosemide (LASIX) " injection 80 mg  Once         12/21/24 1949    12/21/24 1955  High Sensitivity Troponin T 1Hr  PROCEDURE ONCE         12/21/24 1925    12/21/24 1955  iopamidol (ISOVUE-300) 61 % injection 85 mL  Once in Imaging         12/21/24 1939    12/21/24 1904  Scan Slide  Once,   Status:  Canceled         12/21/24 1903    12/21/24 1900  CT Angiogram Chest Pulmonary Embolism  1 Time Imaging         12/21/24 1859    12/21/24 1851  COVID-19 and FLU A/B PCR, 1 HR TAT - Swab, Nasopharynx  Once         12/21/24 1850    12/21/24 1851  CBC & Differential  Once         12/21/24 1850    12/21/24 1851  Comprehensive Metabolic Panel  Once         12/21/24 1850    12/21/24 1851  High Sensitivity Troponin T  Once         12/21/24 1850    12/21/24 1851  BNP  Once         12/21/24 1850    12/21/24 1851  C-reactive Protein  Once         12/21/24 1850    12/21/24 1851  Procalcitonin  Once         12/21/24 1850    12/21/24 1851  Magnesium  Once         12/21/24 1850    12/21/24 1851  ECG 12 Lead Dyspnea  Once         12/21/24 1850    12/21/24 1851  XR Chest 1 View  1 Time Imaging         12/21/24 1850    12/21/24 1851  CBC Auto Differential  PROCEDURE ONCE         12/21/24 1850    12/21/24 0000  furosemide (LASIX) 20 MG tablet  Daily         12/21/24 2044    Unscheduled  POC Glucose PRN  As Needed      Comments: Complete no more than 45 minutes prior to patient eating      12/21/24 2201    Unscheduled  Treat Hypoglycemia As Recommended By Glucommander™ & Notify Provider of Treatment  As Needed      Comments: Follow Hypoglycemia Orders As Outlined in Process Instructions (Open Order Report to View Full Instructions)  Notify Provider Any Time Hypoglycemia Treatment is Administered    12/21/24 2201                  Physician Progress Notes (last 48 hours)  Notes from 12/20/24 0953 through 12/22/24 0953   No notes of this type exist for this encounter.          Consult Notes (last 48 hours)        Danny Yun MD at 12/22/24 0813        Consult  Orders    1. Inpatient Cardiology Consult [[id number]] ordered by Emery Guzman DO at 12/21/24 2201                   Referring Provider: Deanne Bowles MD    Reason for Consultation: Atrial fibrillation/flutter with rapid ventricular rate      Patient Care Team:  David Em MD as PCP - General (Internal Medicine)  Ha Toussaint MD as Consulting Physician (Cardiology)  Shannan Ramon MD as Consulting Physician (Hematology and Oncology)  Xavier Boston MD as Consulting Physician (Endocrinology)  Brionna Weinstein DO as Surgeon (General Surgery)      SUBJECTIVE     Chief Complaint: Shortness of breath    History of present illness:  Blane Dietz is a 89 y.o. male with hypertension, hyperlipidemia, aortic stenosis status post Ross procedure, heart failure with midrange ejection fraction, diabetes, chronic kidney disease stage IIIb, atrial fibrillation/flutter on amiodarone and metoprolol, B-cell lymphoma who presented to the hospital with complaints of shortness of breath associated with leg edema.  In the ER he received IV diuretics.  ECG showed atrial flutter with rapid ventricular rate.  He has known right bundle branch block and therefore the initial rhythm was regular wide-complex tachycardia.  Monomorphic VT could not be completely ruled out.  Patient remained hemodynamically stable.  Another load during dose of amiodarone was provided to the patient.  He spontaneously converted into normal sinus rhythm      Review of systems:    Constitutional: No weakness, fatigue, fever, rigors, chills   Eyes: No vision changes, eye pain   ENT/oropharynx: No difficulty swallowing, sore throat, epistaxis, changes in hearing   Cardiovascular: No chest pain, chest tightness, palpitations, paroxysmal nocturnal dyspnea, orthopnea, diaphoresis, dizziness / syncopal episode   Respiratory: + shortness of breath, dyspnea on exertion, cough, wheezing, hemoptysis   Gastrointestinal: No abdominal pain,  nausea, vomiting, diarrhea, bloody stools   Genitourinary: No hematuria, dysuria   Neurological: No headache, tremors, numbness, one-sided weakness    Musculoskeletal: No cramps, myalgias, joint pain, joint swelling   Integument: No rash, + edema        Personal History:      Past Medical History:   Diagnosis Date    Aortic stenosis     status post ROSS procedure, remote, with December 2015 echocardiography revealing normal aortic and pulmonary valve function, normal ejection fraction and mild to moderate MR    Arthritis     Bilateral impacted cerumen 11/23/2016    Bronchitis     CHF (congestive heart failure)     Chronic gout of right foot 06/13/2016    Impression: 03/08/2016 - stable.;     COVID-19 vaccine series completed 05/12/2021    [place] 2nd dose 3/12/21.    Depression     Diabetes mellitus     Diverticulitis     Exogenous obesity     Gout     Heart murmur     History of vitamin D deficiency     Hypercholesteremia 08/11/2016    Hyperlipidemia     Hypertension     Kidney lesion     Malignant neoplasm of prostate     Morbid obesity 08/11/2016    Pneumonia 05/12/2021    Primary osteoarthritis involving multiple joints 06/13/2016    Impression: 03/08/2016 - stable;     Pulmonary embolism     Sleep apnea 05/12/2021    C-Pap    Uncontrolled type 2 diabetes mellitus with hyperglycemia 06/13/2016    Impression: 03/08/2016 - seeing Endo .;     Vertigo        Past Surgical History:   Procedure Laterality Date    CARDIAC VALVE REPLACEMENT  05/12/2021    Ross procedure 22 years ago    COLON SURGERY      COLONOSCOPY      COLOSTOMY  1999    COLOSTOMY REVISION      CYSTOSCOPY N/A 12/7/2021    Procedure: CYSTOSCOPY FLEXIBLE;  Surgeon: José Miguel Villafuerte Jr., MD;  Location: [facility];  Service: Urology;  Laterality: N/A;    EXPLORATORY LAPAROTOMY      With Tissue Removal    LIPOMA EXCISION      MOHS SURGERY      NEPHROURETERECTOMY Right 12/7/2021    Procedure: RIGHT NEPHROURETERECTOMY LAPAROSCOPIC WITH DAVINCI ROBOT;   "Surgeon: José Miguel Villafuerte Jr., MD;  Location: [facility];  Service: Robotics - DaVinci;  Laterality: Right;    OTHER SURGICAL HISTORY      Porcine Valve    PROSTATE SURGERY      PROSTATECTOMY  2000     History of Prostatectomy Perineal Radical    SKIN CANCER EXCISION      from head and lip    TONSILLECTOMY         Family History   Problem Relation Age of Onset    Diabetes Mother     Lung cancer Mother     Cancer Mother     Heart disease Father     Breast cancer Other     Cancer Other     Hypertension Other     Migraines Other     Obesity Other     Diabetes Other        Social History     Tobacco Use    Smoking status: Never     Passive exposure: Never    Smokeless tobacco: Never   Vaping Use    Vaping status: Never Used   Substance Use Topics    Alcohol use: No    Drug use: No        Home meds:  Prior to Admission medications    Medication Sig Start Date End Date Taking? Authorizing Provider   allopurinol (ZYLOPRIM) 100 MG tablet TAKE 1 TABLET BY MOUTH DAILY 10/7/24   David Em MD   amiodarone (PACERONE) 200 MG tablet Take 1 tablet by mouth Daily. 12/5/22   Ha Toussaint MD   Apoaequorin (PREVAGEN PO) Take  by mouth.    Dave Devi MD   ascorbic acid (VITAMIN C) 1000 MG tablet Take 1 tablet by mouth Daily.    Dave Devi MD   Calcium Carb-Cholecalciferol 600-5 MG-MCG tablet Take 1 tablet by mouth Every Other Day. Indications: Low Amount of Calcium in the Blood 7/17/24   Olimpia Real APRN   ciclopirox (LOPROX) 1 % shampoo  8/21/24   Dave Devi MD   CINNAMON PO Take  by mouth.    Dave Devi MD   Droplet Insulin Syringe 31G X 5/16\" 0.5 ML misc  4/24/24   Dave Devi MD   Dulaglutide (Trulicity) 3 MG/0.5ML solution pen-injector Inject 0.5 mL under the skin into the appropriate area as directed 1 (One) Time Per Week. 7/25/24   David Em MD   Entresto 24-26 MG tablet TAKE ONE TABLET BY MOUTH EVERY 12 HOURS 10/24/24   David Em MD "   enzalutamide (XTANDI) 40 MG tablet tablet Take 3 tablets by mouth Daily. 9/25/24   Shannan Ramon MD   fluocinonide (LIDEX) 0.05 % external solution  8/19/24   Dave Devi MD   fluticasone (FLONASE) 50 MCG/ACT nasal spray 2 sprays into the nostril(s) as directed by provider Daily. 2 puffs each nostril  Patient taking differently: Administer 2 sprays into the nostril(s) as directed by provider Daily As Needed for Rhinitis or Allergies. 12/20/19   Rosanna Nicolas APRN   furosemide (LASIX) 20 MG tablet Take 1 tablet by mouth Daily for 5 days. 12/21/24 12/26/24  Franko Ornelas MD   glucose blood (True Metrix Blood Glucose Test) test strip Use to test blood sugar 3 times daily.  Dx E11.65 4/30/24   Sophia Ferreira PA   Jardiance 25 MG tablet tablet TAKE 1 TABLET BY MOUTH DAILY 10/7/24   David Em MD   ketoconazole (NIZORAL) 2 % shampoo Apply 1 application  topically to the appropriate area as directed 2 (Two) Times a Week. Indications: Tinea Versicolor 5/26/23   Dave Devi MD   Lantus 100 UNIT/ML injection Inject up to 50 units daily subcutaneously  Patient taking differently: Inject up to 20 units daily subcutaneously 7/21/23   Xavier Bsoton MD   metoprolol succinate XL (TOPROL-XL) 25 MG 24 hr tablet TAKE 1/2 TABLET BY MOUTH DAILY 10/7/24   David Em MD   multivitamin with minerals tablet tablet Take 1 tablet by mouth Daily. AREDS 2  Indications: vitamin deficiency    Dave Devi MD   pravastatin (PRAVACHOL) 40 MG tablet TAKE ONE TABLET BY MOUTH EVERY NIGHT AT BEDTIME 10/7/24   David Em MD   triamcinolone (KENALOG) 0.1 % cream Apply 1 Application topically to the appropriate area as directed 2 (Two) Times a Day.    Dave Devi MD   VITAMIN D, CHOLECALCIFEROL, PO Take  by mouth.    Dave Devi MD       Allergies:     Ampicillin, Medrol [methylprednisolone], Myrbetriq [mirabegron], Penicillins, Bee venom, and  "Melatonin    Scheduled Meds:allopurinol, 100 mg, Oral, Daily  amiodarone, 200 mg, Oral, Daily  ascorbic acid, 1,000 mg, Oral, Daily  [START ON 12/23/2024] Calcium Carb-Cholecalciferol, 1 tablet, Oral, Once per day on Monday Wednesday Friday  empagliflozin, 25 mg, Oral, Daily  enoxaparin, 1 mg/kg, Subcutaneous, Q12H  furosemide, 80 mg, Intravenous, BID  insulin glargine, 1-200 Units, Subcutaneous, Nightly - Glucommander  insulin lispro, 1-200 Units, Subcutaneous, 4x Daily With Meals & Nightly  metoprolol succinate XL, 12.5 mg, Oral, Daily  multivitamin with minerals, 1 tablet, Oral, Daily  pravastatin, 40 mg, Oral, Daily  sacubitril-valsartan, 1 tablet, Oral, Q12H  sodium chloride, 10 mL, Intravenous, Q12H      Continuous Infusions:Pharmacy to Dose enoxaparin (LOVENOX),       PRN Meds:  acetaminophen    senna-docusate sodium **AND** polyethylene glycol **AND** bisacodyl **AND** bisacodyl    calcium carbonate    Calcium Replacement - Follow Nurse / BPA Driven Protocol    dextrose    dextrose    fluticasone    glucagon (human recombinant)    insulin lispro    Magnesium Standard Dose Replacement - Follow Nurse / BPA Driven Protocol    metoprolol tartrate    nitroglycerin    ondansetron ODT **OR** ondansetron    Pharmacy to Dose enoxaparin (LOVENOX)    Phosphorus Replacement - Follow Nurse / BPA Driven Protocol    Potassium Replacement - Follow Nurse / BPA Driven Protocol    sodium chloride    sodium chloride      OBJECTIVE    Vital Signs  Vitals:    12/21/24 2233 12/22/24 0410 12/22/24 0700 12/22/24 0857   BP: 150/87 115/71 101/60 101/60   BP Location: Left arm Left arm Left arm    Patient Position: Lying Lying Lying    Pulse: 103 89     Resp: 16 22 22    Temp:  97.4 °F (36.3 °C) 97.4 °F (36.3 °C)    TempSrc:  Axillary Axillary    SpO2: 98% 98% 100%    Weight: 87.8 kg (193 lb 9 oz) 87 kg (191 lb 12.8 oz)  87 kg (191 lb 12.8 oz)   Height: 168.9 cm (66.5\")   168.9 cm (66.5\")       Flowsheet Rows      Flowsheet Row First " "Filed Value   Admission Height 168.9 cm (66.5\") Documented at 12/21/2024 2233   Admission Weight 87.8 kg (193 lb 9 oz) Documented at 12/21/2024 2233              Intake/Output Summary (Last 24 hours) at 12/22/2024 0904  Last data filed at 12/22/2024 0845  Gross per 24 hour   Intake 100 ml   Output 2375 ml   Net -2275 ml        Telemetry: Paroxysmal atrial flutter with rapid ventricular rate, currently normal sinus rhythm    Physical Exam:  The patient is alert, oriented and in no distress.  Vital signs as noted above.  Head and neck revealed no carotid bruits or jugular venous distention.  No thyromegaly or lymphadenopathy is present  Lungs clear.  No wheezing.  Breath sounds are normal bilaterally.  Heart: Normal first and second heart sounds. No murmur.  No precordial rub is present.  No gallop is present.  Abdomen: Soft and nontender.  No organomegaly is present.  Extremities with good peripheral pulses without any pedal edema.  Skin: Warm and dry.  Musculoskeletal system is grossly normal.  CNS grossly normal.       Results Review:  I have personally reviewed the results from the time of this admission to 12/22/2024 09:04 EST and agree with these findings:  []  Laboratory  []  Microbiology  []  Radiology  []  EKG/Telemetry   []  Cardiology/Vascular   []  Pathology  []  Old records  []  Other:    Most notable findings include:     Lab Results (last 24 hours)       Procedure Component Value Units Date/Time    POC Glucose 4x Daily Before Meals & at Bedtime [[id number]]  (Normal) Collected: 12/22/24 0715    Specimen: Blood Updated: 12/22/24 0727     Glucose 104 mg/dL      Comment: Serial Number: [id number]:  [id number]       Comprehensive Metabolic Panel [[id number]]  (Abnormal) Collected: 12/22/24 0609    Specimen: Blood Updated: 12/22/24 0639     Glucose 122 mg/dL      BUN 30 mg/dL      Creatinine 1.46 mg/dL      Sodium 143 mmol/L      Potassium 4.4 mmol/L      Chloride 107 mmol/L      CO2 28.4 mmol/L      " Calcium 8.9 mg/dL      Total Protein 6.4 g/dL      Albumin 3.8 g/dL      ALT (SGPT) 10 U/L      AST (SGOT) 19 U/L      Alkaline Phosphatase 96 U/L      Total Bilirubin 1.1 mg/dL      Globulin 2.6 gm/dL      A/G Ratio 1.5 g/dL      BUN/Creatinine Ratio 20.5     Anion Gap 7.6 mmol/L      eGFR 46.3 mL/min/1.73     Narrative:      GFR Categories in Chronic Kidney Disease (CKD)      GFR Category          GFR (mL/min/1.73)    Interpretation  G1                     90 or greater         Normal or high (1)  G2                      60-89                Mild decrease (1)  G3a                   45-59                Mild to moderate decrease  G3b                   30-44                Moderate to severe decrease  G4                    15-29                Severe decrease  G5                    14 or less           Kidney failure          (1)In the absence of evidence of kidney disease, neither GFR category G1 or G2 fulfill the criteria for CKD.    eGFR calculation 2021 CKD-EPI creatinine equation, which does not include race as a factor    CBC (No Diff) [[id number]]  (Abnormal) Collected: 12/22/24 0609    Specimen: Blood Updated: 12/22/24 0625     WBC 6.02 10*3/mm3      RBC 4.37 10*6/mm3      Hemoglobin 13.2 g/dL      Hematocrit 41.3 %      MCV 94.5 fL      MCH 30.2 pg      MCHC 32.0 g/dL      RDW 15.6 %      RDW-SD 53.7 fl      MPV 10.6 fL      Platelets 105 10*3/mm3     POC Glucose Once [[id number]]  (Abnormal) Collected: 12/21/24 2244    Specimen: Blood Updated: 12/22/24 0008     Glucose 274 mg/dL      Comment: Serial Number: [id number]:  [id number]       TSH [[id number]]  (Normal) Collected: 12/21/24 2000    Specimen: Blood Updated: 12/21/24 2207     TSH 2.090 uIU/mL     T4, Free [[id number]]  (Normal) Collected: 12/21/24 2000    Specimen: Blood Updated: 12/21/24 2207     Free T4 1.61 ng/dL     High Sensitivity Troponin T 1Hr [[id number]]  (Abnormal) Collected: 12/21/24 2000    Specimen: Blood Updated: 12/21/24 2029     HS  Troponin T 31 ng/L      Troponin T Numeric Delta -6 ng/L      Troponin T % Delta -16 %     Narrative:      High Sensitive Troponin T Reference Range:  <14.0 ng/L- Negative Female for AMI  <22.0 ng/L- Negative Male for AMI  >=14 - Abnormal Female indicating possible myocardial injury.  >=22 - Abnormal Male indicating possible myocardial injury.   Clinicians would have to utilize clinical acumen, EKG, Troponin, and serial changes to determine if it is an Acute Myocardial Infarction or myocardial injury due to an underlying chronic condition.         BNP [[id number]]  (Abnormal) Collected: 12/21/24 1855    Specimen: Blood Updated: 12/21/24 1950     proBNP 8,775.0 pg/mL     Narrative:      This assay is used as an aid in the diagnosis of individuals suspected of having heart failure. It can be used as an aid in the diagnosis of acute decompensated heart failure (ADHF) in patients presenting with signs and symptoms of ADHF to the emergency department (ED). In addition, NT-proBNP of <300 pg/mL indicates ADHF is not likely.    Age Range Result Interpretation  NT-proBNP Concentration (pg/mL:      <50             Positive            >450                   Gray                 300-450                    Negative             <300    50-75           Positive            >900                  Gray                300-900                  Negative            <300      >75             Positive            >1800                  Gray                300-1800                  Negative            <300    COVID-19 and FLU A/B PCR, 1 HR TAT - Swab, Nasopharynx [[id number]]  (Normal) Collected: 12/21/24 1912    Specimen: Swab from Nasopharynx Updated: 12/1937     COVID19 Not Detected     Influenza A PCR Not Detected     Influenza B PCR Not Detected    Narrative:      Fact sheet for providers: https://www.fda.gov/media/600879/download    Fact sheet for patients: https://www.fda.gov/media/314559/download    Test performed by PCR.     "Procalcitonin [[id number]]  (Normal) Collected: 12/21/24 1855    Specimen: Blood Updated: 12/21/24 1928     Procalcitonin 0.08 ng/mL     Narrative:      As a Marker for Sepsis (Non-Neonates):    1. <0.5 ng/mL represents a low risk of severe sepsis and/or septic shock.  2. >2 ng/mL represents a high risk of severe sepsis and/or septic shock.    As a Marker for Lower Respiratory Tract Infections that require antibiotic therapy:    PCT on Admission    Antibiotic Therapy       6-12 Hrs later    >0.5                Strongly Recommended  >0.25 - <0.5        Recommended   0.1 - 0.25          Discouraged              Remeasure/reassess PCT  <0.1                Strongly Discouraged     Remeasure/reassess PCT    As 28 day mortality risk marker: \"Change in Procalcitonin Result\" (>80% or <=80%) if Day 0 (or Day 1) and Day 4 values are available. Refer to http://www.[employer]-pct-calculator.com    Change in PCT <=80%  A decrease of PCT levels below or equal to 80% defines a positive change in PCT test result representing a higher risk for 28-day all-cause mortality of patients diagnosed with severe sepsis for septic shock.    Change in PCT >80%  A decrease of PCT levels of more than 80% defines a negative change in PCT result representing a lower risk for 28-day all-cause mortality of patients diagnosed with severe sepsis or septic shock.       High Sensitivity Troponin T [[id number]]  (Abnormal) Collected: 12/21/24 1855    Specimen: Blood Updated: 12/21/24 1925     HS Troponin T 37 ng/L     Narrative:      High Sensitive Troponin T Reference Range:  <14.0 ng/L- Negative Female for AMI  <22.0 ng/L- Negative Male for AMI  >=14 - Abnormal Female indicating possible myocardial injury.  >=22 - Abnormal Male indicating possible myocardial injury.   Clinicians would have to utilize clinical acumen, EKG, Troponin, and serial changes to determine if it is an Acute Myocardial Infarction or myocardial injury due to an underlying chronic " condition.         C-reactive Protein [[id number]]  (Normal) Collected: 12/21/24 1855    Specimen: Blood Updated: 12/21/24 1925     C-Reactive Protein <0.30 mg/dL     Comprehensive Metabolic Panel [[id number]]  (Abnormal) Collected: 12/21/24 1855    Specimen: Blood Updated: 12/21/24 1923     Glucose 232 mg/dL      Comment: Glucose >180, Hemoglobin A1C recommended.        BUN 30 mg/dL      Creatinine 1.46 mg/dL      Sodium 138 mmol/L      Potassium 4.1 mmol/L      Chloride 101 mmol/L      CO2 23.0 mmol/L      Calcium 8.8 mg/dL      Total Protein 6.9 g/dL      Albumin 4.1 g/dL      ALT (SGPT) 11 U/L      AST (SGOT) 21 U/L      Alkaline Phosphatase 103 U/L      Total Bilirubin 1.5 mg/dL      Globulin 2.8 gm/dL      A/G Ratio 1.5 g/dL      BUN/Creatinine Ratio 20.5     Anion Gap 14.0 mmol/L      eGFR 46.3 mL/min/1.73     Narrative:      GFR Categories in Chronic Kidney Disease (CKD)      GFR Category          GFR (mL/min/1.73)    Interpretation  G1                     90 or greater         Normal or high (1)  G2                      60-89                Mild decrease (1)  G3a                   45-59                Mild to moderate decrease  G3b                   30-44                Moderate to severe decrease  G4                    15-29                Severe decrease  G5                    14 or less           Kidney failure          (1)In the absence of evidence of kidney disease, neither GFR category G1 or G2 fulfill the criteria for CKD.    eGFR calculation 2021 CKD-EPI creatinine equation, which does not include race as a factor    Magnesium [[id number]]  (Normal) Collected: 12/21/24 1855    Specimen: Blood Updated: 12/21/24 1923     Magnesium 2.2 mg/dL     CBC & Differential [[id number]]  (Abnormal) Collected: 12/21/24 1855    Specimen: Blood Updated: 12/21/24 1906    Narrative:      The following orders were created for panel order CBC & Differential.  Procedure                               Abnormality          Status                     ---------                               -----------         ------                     CBC Auto Differential[[id number]]        Abnormal            Final result               Scan Slide[[id number]]                                                                    Please view results for these tests on the individual orders.    CBC Auto Differential [[id number]]  (Abnormal) Collected: 12/21/24 1855    Specimen: Blood Updated: 12/21/24 1906     WBC 6.49 10*3/mm3      RBC 4.60 10*6/mm3      Hemoglobin 14.1 g/dL      Hematocrit 43.3 %      MCV 94.1 fL      MCH 30.7 pg      MCHC 32.6 g/dL      RDW 15.7 %      RDW-SD 53.9 fl      MPV 10.7 fL      Platelets 108 10*3/mm3      Neutrophil % 65.6 %      Lymphocyte % 19.1 %      Monocyte % 8.8 %      Eosinophil % 3.9 %      Basophil % 1.2 %      Immature Grans % 1.4 %      Neutrophils, Absolute 4.26 10*3/mm3      Lymphocytes, Absolute 1.24 10*3/mm3      Monocytes, Absolute 0.57 10*3/mm3      Eosinophils, Absolute 0.25 10*3/mm3      Basophils, Absolute 0.08 10*3/mm3      Immature Grans, Absolute 0.09 10*3/mm3      nRBC 0.0 /100 WBC             Imaging Results (Last 24 Hours)       Procedure Component Value Units Date/Time    CT Angiogram Chest Pulmonary Embolism [[id number]] Collected: 12/21/24 2008     Updated: 12/21/24 2009    Narrative:      FINAL REPORT    TECHNIQUE:  null    CLINICAL HISTORY:  Shortness of breath x 1 week, recent respiratory illness 2  months ago, history of PE, eval PE    COMPARISON:  null    FINDINGS:  Exam: CTA Chest with IV contrast.    Procedure: Contrast was administered. Coronal and sagittal MIP reformats were performed    Comparison: 11/21/2024    Clinical history: Shortness of breath x1 week. Recent respiratory illness 2 months ago. History of PE. Eval PE    Findings:    Evaluation for PE in the subsegmental arteries of the lower lobes bilaterally is limited due to suboptimal opacification. No pulmonary emboli identified  elsewhere in the chest.    Prior median sternotomy and CABG.    Ascending thoracic aortic aneurysm measuring 4.4 cm is stable compared to 11/21/2024    No thoracic aortic aneurysm.    No hilar or mediastinal adenopathy.    Cardiomegaly    No pericardial fluid collection.    No pneumothorax.    Small bilateral pleural fluid collections are similar to prior exam.    Bilateral lower lobe consolidation may represent atelectasis or pneumonia.    Nodular cirrhotic appearing liver, unchanged .    Visualized liver, spleen and pancreas do not demonstrate any acute process.    No gallstones.    No thoracic spine compression fractures      Impression:      Impression:    1. Evaluation for PE in the subsegmental arteries of the lower lobes bilaterally is limited due to suboptimal opacification. No pulmonary emboli identified elsewhere in the chest.    2. Ascending thoracic aortic aneurysm is unchanged in size compared to prior exam. No contrast within the aorta.    3. Stable small bilateral pleural fluid collections. Bilateral lower lobe consolidation may represent pneumonia or atelectasis.    4. Nodular cirrhotic appearing liver, unchanged.    Authenticated and Electronically Signed by Leatha Velasco MD  on 12/21/2024 08:08:28 PM    XR Chest 1 View [[id number]] Resulted: 12/21/24 1931     Updated: 12/21/24 1931            LAB RESULTS (LAST 7 DAYS)    CBC  Results from last 7 days   Lab Units 12/22/24  0609 12/21/24  1855   WBC 10*3/mm3 6.02 6.49   RBC 10*6/mm3 4.37 4.60   HEMOGLOBIN g/dL 13.2 14.1   HEMATOCRIT % 41.3 43.3   MCV fL 94.5 94.1   PLATELETS 10*3/mm3 105* 108*       BMP  Results from last 7 days   Lab Units 12/22/24  0609 12/21/24  1855   SODIUM mmol/L 143 138   POTASSIUM mmol/L 4.4 4.1   CHLORIDE mmol/L 107 101   CO2 mmol/L 28.4 23.0   BUN mg/dL 30* 30*   CREATININE mg/dL 1.46* 1.46*   GLUCOSE mg/dL 122* 232*   MAGNESIUM mg/dL  --  2.2       CMP   Results from last 7 days   Lab Units 12/22/24  0609 12/21/24  1855    SODIUM mmol/L 143 138   POTASSIUM mmol/L 4.4 4.1   CHLORIDE mmol/L 107 101   CO2 mmol/L 28.4 23.0   BUN mg/dL 30* 30*   CREATININE mg/dL 1.46* 1.46*   GLUCOSE mg/dL 122* 232*   ALBUMIN g/dL 3.8 4.1   BILIRUBIN mg/dL 1.1 1.5*   ALK PHOS U/L 96 103   AST (SGOT) U/L 19 21   ALT (SGPT) U/L 10 11       BNP        TROPONIN  Results from last 7 days   Lab Units 12/21/24 2000   HSTROP T ng/L 31*       CoAg        Creatinine Clearance  Estimated Creatinine Clearance: 37.2 mL/min (A) (by C-G formula based on SCr of 1.46 mg/dL (H)).    ABG          Radiology  CT Angiogram Chest Pulmonary Embolism    Result Date: 12/21/2024  Impression: 1. Evaluation for PE in the subsegmental arteries of the lower lobes bilaterally is limited due to suboptimal opacification. No pulmonary emboli identified elsewhere in the chest. 2. Ascending thoracic aortic aneurysm is unchanged in size compared to prior exam. No contrast within the aorta. 3. Stable small bilateral pleural fluid collections. Bilateral lower lobe consolidation may represent pneumonia or atelectasis. 4. Nodular cirrhotic appearing liver, unchanged. Authenticated and Electronically Signed by Leatha Velasco MD on 12/21/2024 08:08:28 PM       EKG  I personally viewed and interpreted the patient's EKG/Telemetry data:  ECG 12 Lead Rhythm Change   Final Result      ECG 12 Lead Dyspnea   Final Result            Echocardiogram:    Results for orders placed during the hospital encounter of 12/21/24    Adult Transthoracic Echo Complete W/ Cont if Necessary Per Protocol    Interpretation Summary    Left ventricular systolic function is moderately decreased. Calculated left ventricular EF = 38.2% Left ventricular ejection fraction appears to be 36 - 40%.    The left ventricular cavity is mildly dilated.    Left ventricular diastolic dysfunction is noted.    Mildly reduced right ventricular systolic function noted.    The right ventricular cavity is mildly dilated.    The left atrial  cavity is dilated.    The right atrial cavity is dilated.    Moderate aortic valve regurgitation is present.  Patient has a history of Ross procedure.    Moderate mitral valve regurgitation is present.    Estimated right ventricular systolic pressure from tricuspid regurgitation is normal (<35 mmHg).        Stress Test:        Cardiac Catheterization:  No results found for this or any previous visit.        Other:      ASSESSMENT & PLAN:    Principal Problem:    Atrial arrhythmia  Active Problems:    Type 2 diabetes mellitus with hyperglycemia, with long-term current use of insulin    Stage 3b chronic kidney disease    Chronic HFrEF (heart failure with reduced ejection fraction)    Valvular heart disease    Atrial flutter with rapid ventricular rate  Patient has known atrial arrhythmia  TSH and free T4 are normal  Continue amiodarone and metoprolol  Reloaded with IV amnio in the ER  Follow-up with outpatient cardiologist to discuss flutter ablation  MCOT at the time of discharge  May need a comprehensive EP study as monomorphic VT could not be ruled out  [id number] score is 6  Start anticoagulation with Eliquis 5 mg p.o. twice daily    Heart failure with reduced ejection fraction  Previous echocardiogram showed EF of 45 to 50%  proBNP of 8700  Continue diuretics  Continue Jardiance, beta-blocker  Continue Entresto  Repeat echocardiogram shows EF of 38% with moderate aortic and mitral valve regurgitation  Continue close follow-up with outpatient cardiologist    Aortic stenosis  Status post Ross procedure  Echocardiogram shows at least moderate aortic valve regurgitation  Continue follow-up with outpatient cardiology.  Continue diuresis    Hyperlipidemia  Continue pravastatin  LDL is 62 and at goal    Diabetes  Continue insulin  A1c is 9.2    Chronic kidney disease  Creatinine 1.5, GFR is 46.3.  Closely monitor renal function while on diuretics        Danny Yun MD  12/22/24  09:04  EST                Electronically signed by Danny Yun MD at 12/22/24 0906       Physical Therapy Notes (last 48 hours)  Notes from 12/20/24 0953 through 12/22/24 0953   No notes exist for this encounter.       Occupational Therapy Notes (last 48 hours)  Notes from 12/20/24 0953 through 12/22/24 0953   No notes exist for this encounter.

## 2024-12-22 NOTE — H&P
[facility]   HISTORY AND PHYSICAL      Name:  Blane Dietz   Age:  89 y.o.  Sex:  male  :  1935  MRN:  [id number]   Visit Number:  [id number]  Admission Date:  2024  Date Of Service:  24  Primary Care Physician:  David Em MD    Chief Complaint:     dyspnea    History Of Presenting Illness:      89-year-old male with past medical history of atrial fibrillation, aortic stenosis, status post aortic and pulmonic valve replacement, HFrEF, diabetes mellitus, gout, hypertension, hyperlipidemia, history of nephrectomy, history of prostatectomy secondary to malignancy, B-cell lymphoma with bony metastasis, sleep apnea.  Chief complaint: Shortness of breath. Increased shortness of breath over the last 24 hours.  Associated with leg swelling. He does report that he has been off anticoagulation.  Was given 80 mg IV Lasix in the ED he was maintaining oxygen sats and was planning for discharge made a loop around the ED and started feeling funny they put him back on the monitor and found he was having RVR.  After which family did not feel comfortable taking him home. Discussed his case with Dr. Yun who recommended we admit and continue amiodarone, metoprolol. Not sure why he was taken off blood thinners. Full code.    Pertinent findings: Highly sensitive troponin 37 and then 31 which is at or near baseline, creatinine 0.46 which is at or near baseline, glucose 232, hemoglobin 14.1, flu negative, BNP 8775, chest x-ray shows a trace left and small right pleural effusion shows right airspace disease in the lower lobe perhaps somewhat magnified by the pacer pad, CTA of the chest showing no PE stable aneurysm stable small bilateral effusions and lower lobe consolidation pneumonia versus atelectasis, cirrhotic appearing liver.  EKG shows a regular narrow complex tachycardia with a rate of about 175 associated interventricular conduction delay nonspecific ST and T wave changes,  initial EKG shows, sinus rhythm with a right bundle branch block with nonspecific T wave changes prominent U waves. Review of telemetry shows Wide complex at baseline which flipped axis during his episode of tachycardia.    ED Medications:    Medications   iopamidol (ISOVUE-300) 61 % injection 85 mL (85 mL Intravenous Given 12/21/24 1942)   furosemide (LASIX) injection 80 mg (80 mg Intravenous Given 12/21/24 2000)   metoprolol tartrate (LOPRESSOR) tablet 25 mg (25 mg Oral Given 12/21/24 2139)           Edited by: Emery Guzman DO at 12/21/2024 5355     Review Of Systems:    All systems were reviewed and negative except as mentioned in history of presenting illness, assessment and plan.    Past Medical History: Patient  has a past medical history of Aortic stenosis, Arthritis, Bilateral impacted cerumen (11/23/2016), Bronchitis, CHF (congestive heart failure), Chronic gout of right foot (06/13/2016), COVID-19 vaccine series completed (05/12/2021), Depression, Diabetes mellitus, Diverticulitis, Exogenous obesity, Gout, Heart murmur, History of vitamin D deficiency, Hypercholesteremia (08/11/2016), Hyperlipidemia, Hypertension, Kidney lesion, Malignant neoplasm of prostate, Morbid obesity (08/11/2016), Pneumonia (05/12/2021), Primary osteoarthritis involving multiple joints (06/13/2016), Pulmonary embolism, Sleep apnea (05/12/2021), Uncontrolled type 2 diabetes mellitus with hyperglycemia (06/13/2016), and Vertigo.    Past Surgical History: Patient  has a past surgical history that includes Colostomy (1999); Other surgical history; Exploratory laparotomy; Colonoscopy; Revision Colostomy; Prostate surgery; Prostatectomy (2000); Colon surgery; Tonsillectomy; Skin cancer excision; Lipoma Excision; Mohs surgery; Cardiac valve replacement (05/12/2021); nephroureterectomy (Right, 12/7/2021); and Cystoscopy (N/A, 12/7/2021).    Social History: Patient  reports that he has never smoked. He has never been exposed to  "tobacco smoke. He has never used smokeless tobacco. He reports that he does not drink alcohol and does not use drugs.    Family History:  Patient's family history has been reviewed and found to be noncontributory.     Allergies:      Ampicillin, Medrol [methylprednisolone], Myrbetriq [mirabegron], Penicillins, Bee venom, and Melatonin    Home Medications:    Prior to Admission Medications       Prescriptions Last Dose Informant Patient Reported? Taking?    allopurinol (ZYLOPRIM) 100 MG tablet   No No    TAKE 1 TABLET BY MOUTH DAILY    amiodarone (PACERONE) 200 MG tablet   Yes No    Take 1 tablet by mouth Daily.    Apoaequorin (PREVAGEN PO)   Yes No    Take  by mouth.    ascorbic acid (VITAMIN C) 1000 MG tablet   Yes No    Take 1 tablet by mouth Daily.    Calcium Carb-Cholecalciferol 600-5 MG-MCG tablet   No No    Take 1 tablet by mouth Every Other Day. Indications: Low Amount of Calcium in the Blood    ciclopirox (LOPROX) 1 % shampoo   Yes No    CINNAMON PO   Yes No    Take  by mouth.    Droplet Insulin Syringe 31G X 5/16\" 0.5 ML misc   Yes No    Dulaglutide (Trulicity) 3 MG/0.5ML solution pen-injector   No No    Inject 0.5 mL under the skin into the appropriate area as directed 1 (One) Time Per Week.    Entresto 24-26 MG tablet   No No    TAKE ONE TABLET BY MOUTH EVERY 12 HOURS    enzalutamide (XTANDI) 40 MG tablet tablet   No No    Take 3 tablets by mouth Daily.    fluocinonide (LIDEX) 0.05 % external solution   Yes No    fluticasone (FLONASE) 50 MCG/ACT nasal spray   No No    2 sprays into the nostril(s) as directed by provider Daily. 2 puffs each nostril    Patient taking differently:  Administer 2 sprays into the nostril(s) as directed by provider Daily As Needed for Rhinitis or Allergies.    glucose blood (True Metrix Blood Glucose Test) test strip   No No    Use to test blood sugar 3 times daily.  Dx E11.65    Jardiance 25 MG tablet tablet   No No    TAKE 1 TABLET BY MOUTH DAILY    ketoconazole (NIZORAL) 2 % " "shampoo   Yes No    Apply 1 application  topically to the appropriate area as directed 2 (Two) Times a Week. Indications: Tinea Versicolor    Lantus 100 UNIT/ML injection   No No    Inject up to 50 units daily subcutaneously    Patient taking differently:  Inject up to 20 units daily subcutaneously    metoprolol succinate XL (TOPROL-XL) 25 MG 24 hr tablet   No No    TAKE 1/2 TABLET BY MOUTH DAILY    multivitamin with minerals tablet tablet   Yes No    Take 1 tablet by mouth Daily. AREDS 2  Indications: vitamin deficiency    pravastatin (PRAVACHOL) 40 MG tablet   No No    TAKE ONE TABLET BY MOUTH EVERY NIGHT AT BEDTIME    triamcinolone (KENALOG) 0.1 % cream   Yes No    Apply 1 Application topically to the appropriate area as directed 2 (Two) Times a Day.    VITAMIN D, CHOLECALCIFEROL, PO   Yes No    Take  by mouth.              Vital Signs:  Temp:  [97.7 °F (36.5 °C)] 97.7 °F (36.5 °C)  Heart Rate:  [102-203] 103  Resp:  [16-18] 16  BP: (128-154)/(80-96) 150/87        12/21/24 2233   Weight: 87.8 kg (193 lb 9 oz)     Body mass index is 30.77 kg/m².    Physical Exam:     Most recent vital Signs: /87 (BP Location: Left arm, Patient Position: Lying)   Pulse 103   Temp 97.7 °F (36.5 °C) (Oral) Comment: Simultaneous filing. User may be unaware of other data. Comment (Src): Simultaneous filing. User may be unaware of other data.  Resp 16   Ht 168.9 cm (66.5\")   Wt 87.8 kg (193 lb 9 oz)   SpO2 98%   BMI 30.77 kg/m²     Constitutional: Awake, alert  Eyes: PERRLA, sclerae anicteric, no conjunctival injection  HENT: NCAT, mucous membranes moist  Neck: Supple, no thyromegaly, no lymphadenopathy, trachea midline  Respiratory: Basilar Rales bilaterally, nonlabored respirations   Cardiovascular: RRR, no murmurs, rubs, or gallops, palpable pedal pulses bilaterally  Gastrointestinal: Positive bowel sounds, soft, nontender, nondistended  Musculoskeletal: +1 bilateral ankle edema, no clubbing or cyanosis to " "extremities  Psychiatric: Appropriate affect, cooperative  Neurologic: Oriented x 3, speech clear  Skin: hemosiderosis of the shins  Edited by: Emery Guzman,  at 12/21/2024 1968      Laboratory data:    I have reviewed the labs done in the emergency room.    Results from last 7 days   Lab Units 12/21/24  1855   SODIUM mmol/L 138   POTASSIUM mmol/L 4.1   CHLORIDE mmol/L 101   CO2 mmol/L 23.0   BUN mg/dL 30*   CREATININE mg/dL 1.46*   CALCIUM mg/dL 8.8   BILIRUBIN mg/dL 1.5*   ALK PHOS U/L 103   ALT (SGPT) U/L 11   AST (SGOT) U/L 21   GLUCOSE mg/dL 232*     Results from last 7 days   Lab Units 12/21/24  1855   WBC 10*3/mm3 6.49   HEMOGLOBIN g/dL 14.1   HEMATOCRIT % 43.3   PLATELETS 10*3/mm3 108*         Results from last 7 days   Lab Units 12/21/24 2000 12/21/24  1855   HSTROP T ng/L 31* 37*     Results from last 7 days   Lab Units 12/21/24  1855   PROBNP pg/mL 8,775.0*                       Invalid input(s): \"USDES\", \"NITRITITE\", \"BACT\", \"EP\"    Pain Management Panel  More data exists         4/4/2024 12/29/2022   Pain Management Panel   Creatinine, Urine 10  72.7       Details                   EKG:      EKG shows a regular narrow complex tachycardia with a rate of about 175 associated interventricular conduction delay nonspecific ST and T wave changes, initial EKG shows, sinus rhythm with a right bundle branch block with nonspecific T wave changes prominent U waves. Review of telemetry shows Wide complex at baseline which flipped axis during his episode of tachycardia    Radiology:    CT Angiogram Chest Pulmonary Embolism    Result Date: 12/21/2024  FINAL REPORT TECHNIQUE: null CLINICAL HISTORY: Shortness of breath x 1 week, recent respiratory illness 2 months ago, history of PE, eval PE COMPARISON: null FINDINGS: Exam: CTA Chest with IV contrast. Procedure: Contrast was administered. Coronal and sagittal MIP reformats were performed Comparison: 11/21/2024 Clinical history: Shortness of breath x1 week. " Recent respiratory illness 2 months ago. History of PE. Eval PE Findings: Evaluation for PE in the subsegmental arteries of the lower lobes bilaterally is limited due to suboptimal opacification. No pulmonary emboli identified elsewhere in the chest. Prior median sternotomy and CABG. Ascending thoracic aortic aneurysm measuring 4.4 cm is stable compared to 11/21/2024 No thoracic aortic aneurysm. No hilar or mediastinal adenopathy. Cardiomegaly No pericardial fluid collection. No pneumothorax. Small bilateral pleural fluid collections are similar to prior exam. Bilateral lower lobe consolidation may represent atelectasis or pneumonia. Nodular cirrhotic appearing liver, unchanged . Visualized liver, spleen and pancreas do not demonstrate any acute process. No gallstones. No thoracic spine compression fractures     Impression: 1. Evaluation for PE in the subsegmental arteries of the lower lobes bilaterally is limited due to suboptimal opacification. No pulmonary emboli identified elsewhere in the chest. 2. Ascending thoracic aortic aneurysm is unchanged in size compared to prior exam. No contrast within the aorta. 3. Stable small bilateral pleural fluid collections. Bilateral lower lobe consolidation may represent pneumonia or atelectasis. 4. Nodular cirrhotic appearing liver, unchanged. Authenticated and Electronically Signed by Leatha Velasco MD on 12/21/2024 08:08:28 PM     Assessment/Plan:      Atrial arrhythmia (possibly ventricular also)  -- Paroxysmal.  Patient with a history of atrial fibrillation on amiodarone.  EKG here consistent with a single episode of atrial flutter.  Although diuresis may help some with this, he may eventually need ablation or switch to other AAD.  Family did not feel that can safely take him home after he had this episode in the ED.  If no further episodes I feel he can be discharged tomorrow morning. Concern as well for possible ventricular arrhythmia as he flipped his axis during  episode of rapid heart rate.  -- Active Treatments: Continue amiodarone, metoprolol scheduled and prn  -- Pending Results: A.m. labs, cardiology consult, PT/OT      Chronic HFrEF (heart failure with reduced ejection fraction)  --Does not appear significantly exacerbated, CT imaging stable, BNP stable.    -- Active Treatments: Cautiously diurese  -- Pending Results: Monitor creatinine closely      Type 2 diabetes mellitus with hyperglycemia, with long-term current use of insulin    Stage 3b chronic kidney disease    Valvular heart disease  -- Chronic medical problems, would benefit from resuming anticoagulation if possible  -- Active Treatments: Subcutaneous insulin,               DVT Prophylaxis: Lovenox  Code Status: Full code  Diet: Cardiac diabetic renal  Risk assessment: Moderate anticipate less than two night stay          Edited by: Emery Guzman DO at 12/21/2024 4433           Advance Care Planning   ACP discussion was held with the patient during this visit. Patient has an advance directive in EMR which is still valid.            Emery Guzman DO  12/21/24  22:58 EST    Dictated utilizing Dragon dictation.

## 2024-12-22 NOTE — PLAN OF CARE
Phase 1 - Admission/Acute - Current (Heart Failure Pathway)  Output is greater than intake.  Outcome: Met  Patient's oxygen saturation maintained above 90% unless otherwise specified.  Outcome: Met  Patient reports improvement in symptoms since arrival.  Outcome: Met     Problem: Adult Inpatient Plan of Care  Goal: Plan of Care Review  Outcome: Progressing  Goal: Patient-Specific Goal (Individualized)  Outcome: Progressing  Goal: Absence of Hospital-Acquired Illness or Injury  Outcome: Progressing  Intervention: Identify and Manage Fall Risk  Recent Flowsheet Documentation  Taken 12/22/2024 0400 by Mahnaz Guzman RN  Safety Promotion/Fall Prevention: safety round/check completed  Taken 12/22/2024 0200 by Mahnaz Guzman RN  Safety Promotion/Fall Prevention: safety round/check completed  Taken 12/22/2024 0000 by Mahnaz Guzman RN  Safety Promotion/Fall Prevention: safety round/check completed  Taken 12/21/2024 2239 by Mahnaz Guzman RN  Safety Promotion/Fall Prevention: safety round/check completed  Intervention: Prevent Skin Injury  Recent Flowsheet Documentation  Taken 12/22/2024 0200 by Mahnaz Guzman RN  Body Position: sitting up in bed  Taken 12/22/2024 0000 by Mahnaz Guzman RN  Body Position: sitting up in bed  Taken 12/21/2024 2239 by Mahnaz Guzman RN  Body Position: sitting up in bed  Intervention: Prevent Infection  Recent Flowsheet Documentation  Taken 12/22/2024 0400 by Mahnaz Guzman RN  Infection Prevention: environmental surveillance performed  Taken 12/22/2024 0200 by Mahnaz Guzman RN  Infection Prevention: environmental surveillance performed  Taken 12/22/2024 0000 by Mahnaz Guzman RN  Infection Prevention: environmental surveillance performed  Goal: Optimal Comfort and Wellbeing  Outcome: Progressing  Intervention: Provide Person-Centered Care  Recent Flowsheet Documentation  Taken 12/21/2024 2239 by Mahnaz Guzman RN  Trust Relationship/Rapport:   care explained   choices provided  Goal:  Readiness for Transition of Care  Outcome: Progressing     Problem: Heart Failure  Goal: Optimal Coping  Outcome: Progressing  Intervention: Support Psychosocial Response  Recent Flowsheet Documentation  Taken 12/21/2024 2239 by Mahnaz Guzman RN  Family/Support System Care: support provided  Goal: Optimal Cardiac Output and Blood Flow  Outcome: Progressing  Goal: Stable Heart Rate and Rhythm  Outcome: Progressing  Goal: Fluid and Electrolyte Balance  Outcome: Progressing  Goal: Optimal Functional Ability  Outcome: Progressing  Intervention: Optimize Functional Ability  Recent Flowsheet Documentation  Taken 12/22/2024 0200 by Mahnaz Guzman RN  Activity Management: activity encouraged  Taken 12/22/2024 0000 by Mahnaz Guzman RN  Activity Management: activity encouraged  Taken 12/21/2024 2239 by Mahnaz Guzman RN  Activity Management: activity encouraged  Goal: Improved Oral Intake  Outcome: Progressing  Goal: Effective Oxygenation and Ventilation  Outcome: Progressing  Intervention: Promote Airway Secretion Clearance  Recent Flowsheet Documentation  Taken 12/22/2024 0200 by Mahnaz Guzman RN  Activity Management: activity encouraged  Taken 12/22/2024 0000 by Mahnaz Guzman RN  Activity Management: activity encouraged  Taken 12/21/2024 2239 by Mahnaz Guzman RN  Activity Management: activity encouraged  Intervention: Optimize Oxygenation and Ventilation  Recent Flowsheet Documentation  Taken 12/22/2024 0200 by Mahnaz Guzman RN  Head of Bed (HOB) Positioning: HOB at 30 degrees  Goal: Effective Breathing Pattern During Sleep  Outcome: Progressing     Problem: Skin Injury Risk Increased  Goal: Skin Health and Integrity  Outcome: Progressing  Intervention: Optimize Skin Protection  Recent Flowsheet Documentation  Taken 12/22/2024 0200 by Mahnaz Guzman RN  Activity Management: activity encouraged  Head of Bed (HOB) Positioning: HOB at 30 degrees  Taken 12/22/2024 0000 by Mahnaz Guzman RN  Activity Management: activity  encouraged  Taken 12/21/2024 2235 by Mahnaz Guzman, RN  Activity Management: activity encouraged     Problem: Noninvasive Ventilation Acute  Goal: Effective Unassisted Ventilation and Oxygenation  Outcome: Progressing     Problem: Comorbidity Management  Goal: Blood Pressure in Desired Range  Outcome: Progressing

## 2024-12-22 NOTE — PROGRESS NOTES
"Pharmacy Consult - Enoxaparin Dosing    Blane Dietz is a 89 y.o. male who has been consulted to dose enoxaparin for atrial fibrillation (requiring full anticoagulation).     Allergies  Ampicillin, Medrol [methylprednisolone], Myrbetriq [mirabegron], Penicillins, Bee venom, and Melatonin    Relevant clinical data and objective history reviewed:     [Ht: 168.9 cm (66.5\"); Wt: 87.8 kg (193 lb 9 oz)]  Body mass index is 30.77 kg/m².    Estimated Creatinine Clearance: 37.4 mL/min (A) (by C-G formula based on SCr of 1.46 mg/dL (H)).    Results from last 7 days   Lab Units 12/21/24  1855   HEMOGLOBIN g/dL 14.1   HEMATOCRIT % 43.3   PLATELETS 10*3/mm3 108*   CREATININE mg/dL 1.46*       Asessment/Plan    Initiate enoxaparin 90 mg SQ every 12 hours  Pharmacy will monitor Mr. Dietz's renal function and clinical status and adjust the enoxaparin dose and/or frequency as needed.    Thank you,  Raiza Abarca [facility],PharmD  12/21/2024  22:58 EST      "

## 2024-12-22 NOTE — PROGRESS NOTES
"Pharmacy Consult - Eliquis Dosing  Blane Dietz is a 89 y.o. male who has been consulted to dose Eliquis for A.fib.     Allergies  Ampicillin, Medrol [methylprednisolone], Myrbetriq [mirabegron], Penicillins, Bee venom, and Melatonin    Relevant clinical data and objective history reviewed:   [Ht: 168.9 cm (66.5\"); Wt: 87 kg (191 lb 12.8 oz)]  Body mass index is 30.5 kg/m².  Estimated Creatinine Clearance: 37.2 mL/min (A) (by C-G formula based on SCr of 1.46 mg/dL (H)).  Results from last 7 days   Lab Units 12/22/24  0609 12/21/24  1855   HEMOGLOBIN g/dL 13.2 14.1   HEMATOCRIT % 41.3 43.3   PLATELETS 10*3/mm3 105* 108*   CREATININE mg/dL 1.46* 1.46*       Asessment/Plan  Initiate Eliquis 5mg PO BID.  Pharmacy will monitor Mr. Dietz's renal function and clinical status and adjust the Eliquis dose and/or frequency as needed.    Thanks,   Mary Greene, PharmD  12/22/2024  12:19 EST      "

## 2024-12-22 NOTE — PROGRESS NOTES
[facility]    Name:  Blane Dietz   Age:  89 y.o.  Sex:  male  :  1935  MRN:  [id number]   Visit Number:  [id number]  Admission Date:  2024  Date Of Service:  24  Primary Care Physician:  David Em MD     LOS: 0 days :    Chief Complaint:      dyspnea     Subjective:    Patient was seen examined bedside today.  Patient resting comfortably in bed on BiPAP, I took the BiPAP off and spoke with the patient.  Patient reports feeling much better improved today.  He denies any shortness of breath currently or dyspnea.  He denies any pain or discomfort.  Cardiology consulted on the patient.  No acute complaints otherwise.  Vitals are stable, afebrile, patient saturating at 95% on room air.    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  Chief complaint: Shortness of breath. Increased shortness of breath over the last 24 hours.  Associated with leg swelling. He does report that he has been off anticoagulation.  Was given 80 mg IV Lasix in the ED he was maintaining oxygen sats and was planning for discharge made a loop around the ED and started feeling funny they put him back on the monitor and found he was having RVR.  After which family did not feel comfortable taking him home. Discussed his case with Dr. Yun who recommended we admit and continue amiodarone, metoprolol. Not sure why he was taken off blood thinners. Full code.     Pertinent findings: Highly sensitive troponin 37 and then 31 which is at or near baseline, creatinine 0.46 which is at or near baseline, glucose 232, hemoglobin 14.1, flu negative, BNP 8775, chest x-ray shows a trace left and  small right pleural effusion shows right airspace disease in the lower lobe perhaps somewhat magnified by the pacer pad, CTA of the chest showing no PE stable aneurysm stable small bilateral effusions and lower lobe consolidation pneumonia versus atelectasis, cirrhotic appearing liver.  EKG shows a regular narrow complex tachycardia with a rate of about 175 associated interventricular conduction delay nonspecific ST and T wave changes, initial EKG shows, sinus rhythm with a right bundle branch block with nonspecific T wave changes prominent U waves. Review of telemetry shows Wide complex at baseline which flipped axis during his episode of tachycardia.    Review of Systems:     All systems were reviewed and negative except as mentioned in subjective, assessment and plan.    Vital Signs:    Temp:  [97.4 °F (36.3 °C)-97.7 °F (36.5 °C)] 97.4 °F (36.3 °C)  Heart Rate:  [] 89  Resp:  [16-22] 22  BP: (101-154)/(60-96) 101/60    Intake and output:    I/O last 3 completed shifts:  In: 50 [P.O.:50]  Out: 2075 [Urine:2075]  I/O this shift:  In: 50 [P.O.:50]  Out: 300 [Urine:300]    Physical Examination:    General Appearance:  Alert and cooperative.  No acute distress.  Chronically ill appearing.   Head:  Atraumatic and normocephalic.   Eyes: Conjunctivae and sclerae normal, no icterus. No pallor.   Throat: No oral lesions, no thrush, oral mucosa moist.   Neck: Supple, trachea midline, no thyromegaly.   Lungs:   Breath sounds heard bilaterally equally.  No wheezing or crackles. No Pleural rub or bronchial breathing.  Unlabored.  Speaks in complete sentences.   Heart:  Normal S1 and S2, no murmur, no gallop, no rub. No JVD.   Abdomen:   Normal bowel sounds, no masses, no organomegaly. Soft, nontender, nondistended, no rebound tenderness.   Extremities: Supple, no edema, no cyanosis, no clubbing.   Skin: No bleeding or rash.   Neurologic: Alert and oriented x 3. No facial asymmetry. Moves all four limbs. No tremors.   "    Laboratory results:    Results from last 7 days   Lab Units 12/22/24  0609 12/21/24  1855   SODIUM mmol/L 143 138   POTASSIUM mmol/L 4.4 4.1   CHLORIDE mmol/L 107 101   CO2 mmol/L 28.4 23.0   BUN mg/dL 30* 30*   CREATININE mg/dL 1.46* 1.46*   CALCIUM mg/dL 8.9 8.8   BILIRUBIN mg/dL 1.1 1.5*   ALK PHOS U/L 96 103   ALT (SGPT) U/L 10 11   AST (SGOT) U/L 19 21   GLUCOSE mg/dL 122* 232*     Results from last 7 days   Lab Units 12/22/24  0609 12/21/24  1855   WBC 10*3/mm3 6.02 6.49   HEMOGLOBIN g/dL 13.2 14.1   HEMATOCRIT % 41.3 43.3   PLATELETS 10*3/mm3 105* 108*         Results from last 7 days   Lab Units 12/21/24 2000 12/21/24  1855   HSTROP T ng/L 31* 37*         No results for input(s): \"PHART\", \"SUZ6YJE\", \"PO2ART\", \"SCQ2QAJ\", \"BASEEXCESS\" in the last 8760 hours.   I have reviewed the patient's laboratory results.    Radiology results:    CT Angiogram Chest Pulmonary Embolism    Result Date: 12/21/2024  FINAL REPORT TECHNIQUE: null CLINICAL HISTORY: Shortness of breath x 1 week, recent respiratory illness 2 months ago, history of PE, eval PE COMPARISON: null FINDINGS: Exam: CTA Chest with IV contrast. Procedure: Contrast was administered. Coronal and sagittal MIP reformats were performed Comparison: 11/21/2024 Clinical history: Shortness of breath x1 week. Recent respiratory illness 2 months ago. History of PE. Eval PE Findings: Evaluation for PE in the subsegmental arteries of the lower lobes bilaterally is limited due to suboptimal opacification. No pulmonary emboli identified elsewhere in the chest. Prior median sternotomy and CABG. Ascending thoracic aortic aneurysm measuring 4.4 cm is stable compared to 11/21/2024 No thoracic aortic aneurysm. No hilar or mediastinal adenopathy. Cardiomegaly No pericardial fluid collection. No pneumothorax. Small bilateral pleural fluid collections are similar to prior exam. Bilateral lower lobe consolidation may represent atelectasis or pneumonia. Nodular cirrhotic " appearing liver, unchanged . Visualized liver, spleen and pancreas do not demonstrate any acute process. No gallstones. No thoracic spine compression fractures     Impression: Impression: 1. Evaluation for PE in the subsegmental arteries of the lower lobes bilaterally is limited due to suboptimal opacification. No pulmonary emboli identified elsewhere in the chest. 2. Ascending thoracic aortic aneurysm is unchanged in size compared to prior exam. No contrast within the aorta. 3. Stable small bilateral pleural fluid collections. Bilateral lower lobe consolidation may represent pneumonia or atelectasis. 4. Nodular cirrhotic appearing liver, unchanged. Authenticated and Electronically Signed by Leatha Velasco MD on 12/21/2024 08:08:28 PM   I have reviewed the patient's radiology reports.    Medication Review:     I have reviewed the patient's active and prn medications.     Problem List:      Atrial arrhythmia    Type 2 diabetes mellitus with hyperglycemia, with long-term current use of insulin    Stage 3b chronic kidney disease    Chronic HFrEF (heart failure with reduced ejection fraction)    Valvular heart disease      Assessment:    A-flutter with RVR  Atrial arrhythmia   Chronic HFrEF (heart failure with reduced ejection fraction)  Type 2 diabetes mellitus with hyperglycemia, with long-term current use of insulin  Stage 3b chronic kidney disease  Valvular heart disease/aortic stenosis    Plan:    A-flutter with RVR  Atrial arrhythmia   -Cardiology consulted on the patient, appreciate recommendations  -TSH and free T4 are normal  -Continue amiodarone and metoprolol  -Reloaded with IV amnio in the ER  -[id number] score is 6  -Start anticoagulation with Eliquis 5 mg p.o. twice daily, discontinue therapeutic Lovenox  -Follow-up with outpatient cardiologist to discuss flutter ablation  -MCOT at the time of discharge  -May need a comprehensive EP study as monomorphic VT could not be ruled out  -PT/OT consulted        Chronic HFrEF (heart failure with reduced ejection fraction)  -Does not appear significantly exacerbated, CT imaging stable, BNP stable.    -Previous echocardiogram showed EF of 45 to 50%  -Continue diuretics  -Continue Jardiance, beta-blocker  -Continue Entresto  -Repeat echocardiogram shows EF of 38% with moderate aortic and mitral valve regurgitation  -Continue close follow-up with outpatient cardiologist  -Supplemental oxygen to keep saturation over 90%, titrate oxygen down as able to    Aortic stenosis  -Status post Ross procedure  -Echocardiogram shows at least moderate aortic valve regurgitation  -Continue follow-up with outpatient cardiology.  -Continue diuresis    Type 2 diabetes mellitus with hyperglycemia, with long-term current use of insulin  Stage 3b chronic kidney disease  Valvular heart disease  - Chronic medical problems, would benefit from resuming anticoagulation if possible  - Active Treatments: Subcutaneous insulin,      -I have reviewed the copied text and it is accurate as of 12/22/2024       DVT Prophylaxis: Lovenox  Code Status: Full code  Diet: Cardiac diabetic renal  Discharge Plan: Likely discharge in 1 to 2 days.    Deanne Bowles MD  12/22/24  09:49 EST    Dictated utilizing Dragon dictation.

## 2024-12-23 ENCOUNTER — READMISSION MANAGEMENT (OUTPATIENT)
Dept: CALL CENTER | Facility: HOSPITAL | Age: 89
End: 2024-12-23
Payer: MEDICARE

## 2024-12-23 VITALS
RESPIRATION RATE: 18 BRPM | HEART RATE: 96 BPM | TEMPERATURE: 98 F | BODY MASS INDEX: 30.82 KG/M2 | DIASTOLIC BLOOD PRESSURE: 64 MMHG | WEIGHT: 191.8 LBS | SYSTOLIC BLOOD PRESSURE: 104 MMHG | OXYGEN SATURATION: 95 % | HEIGHT: 66 IN

## 2024-12-23 LAB
GLUCOSE BLDC GLUCOMTR-MCNC: 111 MG/DL (ref 70–130)
GLUCOSE BLDC GLUCOMTR-MCNC: 146 MG/DL (ref 70–130)
PHOSPHATE SERPL-MCNC: 3.7 MG/DL (ref 2.5–4.5)

## 2024-12-23 PROCEDURE — 97166 OT EVAL MOD COMPLEX 45 MIN: CPT

## 2024-12-23 PROCEDURE — G0378 HOSPITAL OBSERVATION PER HR: HCPCS

## 2024-12-23 PROCEDURE — 99238 HOSP IP/OBS DSCHRG MGMT 30/<: CPT | Performed by: FAMILY MEDICINE

## 2024-12-23 PROCEDURE — 84100 ASSAY OF PHOSPHORUS: CPT | Performed by: FAMILY MEDICINE

## 2024-12-23 PROCEDURE — 63710000001 INSULIN LISPRO (HUMAN) PER 5 UNITS: Performed by: INTERNAL MEDICINE

## 2024-12-23 PROCEDURE — 82948 REAGENT STRIP/BLOOD GLUCOSE: CPT

## 2024-12-23 PROCEDURE — 94660 CPAP INITIATION&MGMT: CPT

## 2024-12-23 PROCEDURE — 25010000002 FUROSEMIDE PER 20 MG: Performed by: FAMILY MEDICINE

## 2024-12-23 PROCEDURE — 94799 UNLISTED PULMONARY SVC/PX: CPT

## 2024-12-23 PROCEDURE — 96376 TX/PRO/DX INJ SAME DRUG ADON: CPT

## 2024-12-23 PROCEDURE — 82948 REAGENT STRIP/BLOOD GLUCOSE: CPT | Performed by: INTERNAL MEDICINE

## 2024-12-23 PROCEDURE — 97161 PT EVAL LOW COMPLEX 20 MIN: CPT

## 2024-12-23 RX ORDER — FUROSEMIDE 20 MG/1
20 TABLET ORAL 2 TIMES DAILY
Qty: 6 TABLET | Refills: 0 | Status: SHIPPED | OUTPATIENT
Start: 2024-12-23 | End: 2024-12-26

## 2024-12-23 RX ADMIN — EMPAGLIFLOZIN 25 MG: 25 TABLET, FILM COATED ORAL at 08:16

## 2024-12-23 RX ADMIN — PRAVASTATIN SODIUM 40 MG: 20 TABLET ORAL at 08:17

## 2024-12-23 RX ADMIN — Medication 1 TABLET: at 08:17

## 2024-12-23 RX ADMIN — ALLOPURINOL 100 MG: 100 TABLET ORAL at 08:17

## 2024-12-23 RX ADMIN — INSULIN LISPRO 1 UNITS: 100 INJECTION, SOLUTION INTRAVENOUS; SUBCUTANEOUS at 11:58

## 2024-12-23 RX ADMIN — Medication 1 TABLET: at 11:57

## 2024-12-23 RX ADMIN — Medication 10 ML: at 08:18

## 2024-12-23 RX ADMIN — OXYCODONE HYDROCHLORIDE AND ACETAMINOPHEN 1000 MG: 500 TABLET ORAL at 08:16

## 2024-12-23 RX ADMIN — APIXABAN 5 MG: 5 TABLET, FILM COATED ORAL at 08:16

## 2024-12-23 RX ADMIN — METOPROLOL SUCCINATE 12.5 MG: 25 TABLET, EXTENDED RELEASE ORAL at 08:16

## 2024-12-23 RX ADMIN — SACUBITRIL AND VALSARTAN 1 TABLET: 24; 26 TABLET, FILM COATED ORAL at 11:57

## 2024-12-23 RX ADMIN — FUROSEMIDE 40 MG: 10 INJECTION, SOLUTION INTRAMUSCULAR; INTRAVENOUS at 08:18

## 2024-12-23 RX ADMIN — AMIODARONE HYDROCHLORIDE 200 MG: 200 TABLET ORAL at 08:17

## 2024-12-23 RX ADMIN — INSULIN LISPRO 3 UNITS: 100 INJECTION, SOLUTION INTRAVENOUS; SUBCUTANEOUS at 08:18

## 2024-12-23 NOTE — CASE MANAGEMENT/SOCIAL WORK
Case Management/Social Work    Patient Name:  Blane Dietz  YOB: 1935  MRN: [id number]  Admit Date:  12/21/2024        09:45 EST  Met with patient at bedside. Sleeping at time of rounds. Plan for patient to return to [facility] living once medically ready. CM will continue to follow.      Electronically signed by:  Ming Thomas RN  12/23/24 09:45 EST

## 2024-12-23 NOTE — PROGRESS NOTES
RT EQUIPMENT DEVICE RELATED - SKIN ASSESSMENT    Chuck Score:  Chuck Score: 17     RT Medical Equipment/Device:     NIV Mask:  Full-face    size: lg    Skin Assessment:      Nose:  Intact    Device Skin Pressure Protection:   none    Nurse Notification:  No    Steffanie Atkinson, CRT

## 2024-12-23 NOTE — PLAN OF CARE
Goal Outcome Evaluation:  Plan of Care Reviewed With: patient        Progress: no change  Outcome Evaluation: Pt participated in PT initial evaluation this date. Pt presents supine, asleep in bed. Pt pleasant and agreeable to PT evaluation. Pt AOx4, denies reports of pain at rest. Pt reports at baseline, he lives at an shelter and uses a RW for all mobility. Pt denies reports of falls at home. Pt performed supine > sit on EOB with mod (I). Pt performed STS transfer with mod (I) and use of RW. Pt ambulated x300' with RW and SBA, no LOB noted. Pt required VC to maintain COM within RW during gait. BLE MMT WFL. Following evaluation, pt left seated in bedside chair with chair alarm active, call light and all needs within reach. At this time, pt appears to be at functional baseline. Skilled PT intervention is not indicated. Once medically stable, recommend pt to return to shelter and attend OPPT for higher level endurance/balance deficits. PT will sign off.    Anticipated Discharge Disposition (PT): home with outpatient therapy services, assisted living

## 2024-12-23 NOTE — PLAN OF CARE
Goal Outcome Evaluation:           Progress: no change  Outcome Evaluation: OT eval completed. Pt supine in bed upon OT arrival. Pt reports he lives in senior living and is normally (I) with all ADLs. Pt reports he uses a RW for mobility. Pt able to complete supine to sit EOB with MI. Pt able to complete STS with MI and RW. Pt able to complete fxl mobility x300ft with SBA and RW. Pt able to complete toileting tasks with MI. Pt returned to chair in room with SBA and RW. Pt left with needs in reach and chair alarm activated. Pt does not require skilled OT services at this time. OT recommends d/c back to [facility] once medically appropriate. OT to sign off.

## 2024-12-23 NOTE — THERAPY DISCHARGE NOTE
Patient Name: Blane Dietz  : 1935    MRN: [id number]                              Today's Date: 2024       Admit Date: 2024    Visit Dx:     ICD-10-CM ICD-9-CM   1. Acute on chronic congestive heart failure, unspecified heart failure type  I50.9 428.0     Patient Active Problem List   Diagnosis    Essential hypertension    Type 2 diabetes mellitus with hyperglycemia, with long-term current use of insulin    Prostate cancer    Aortic stenosis    Sleep apnea    S/P AVR    HLD (hyperlipidemia)    Renal mass    s/p right nepheroureterectomy and adrenalectomy     Diffuse large B-cell lymphoma of solid organ excluding spleen    PICC (peripherally inserted central catheter) flush    History of pulmonary embolism    History of malignant neoplasm of prostate    Lymphoma of kidney    Stage 3b chronic kidney disease    Pneumonia due to COVID-19 virus    Abrasion    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    Depression     Diabetes mellitus     Diverticulitis     Exogenous obesity     Gout     Heart murmur     History of vitamin D deficiency     Hypercholesteremia 2016    Hyperlipidemia     Hypertension     Impaired mobility     Kidney lesion     Malignant neoplasm of prostate     Morbid obesity 2016    Pneumonia 2021    Primary osteoarthritis involving multiple joints 2016    Impression: 2016 - stable;     Pulmonary  embolism     Sleep apnea 05/12/2021    C-Pap    Uncontrolled type 2 diabetes mellitus with hyperglycemia 06/13/2016    Impression: 03/08/2016 - seeing Endo .;     Vertigo      Past Surgical History:   Procedure Laterality Date    CARDIAC VALVE REPLACEMENT  05/12/2021    Ross procedure 22 years ago    COLON SURGERY      COLONOSCOPY      COLOSTOMY  1999    COLOSTOMY REVISION      CYSTOSCOPY N/A 12/7/2021    Procedure: CYSTOSCOPY FLEXIBLE;  Surgeon: José Miguel Villafuerte Jr., MD;  Location:  [facility];  Service: Urology;  Laterality: N/A;    EXPLORATORY LAPAROTOMY      With Tissue Removal    LIPOMA EXCISION      MOHS SURGERY      NEPHROURETERECTOMY Right 12/7/2021    Procedure: RIGHT NEPHROURETERECTOMY LAPAROSCOPIC WITH DAVINCI ROBOT;  Surgeon: José Miguel Villafuerte Jr., MD;  Location:  [facility];  Service: Robotics - DaVinci;  Laterality: Right;    OTHER SURGICAL HISTORY      Porcine Valve    PROSTATE SURGERY      PROSTATECTOMY  2000     History of Prostatectomy Perineal Radical    SKIN CANCER EXCISION      from head and lip    TONSILLECTOMY        General Information       Row Name 12/23/24 1121          Physical Therapy Time and Intention    Document Type discharge evaluation/summary  -     Mode of Treatment physical therapy  -       Row Name 12/23/24 1121          General Information    Patient Profile Reviewed yes  -     Prior Level of Function independent:;all household mobility  -     Existing Precautions/Restrictions fall;oxygen therapy device and L/min  -     Barriers to Rehab medically complex  -       Row Name 12/23/24 1121          Living Environment    People in Home other (see comments)  residential  -       Row Name 12/23/24 1121          Home Main Entrance    Number of Stairs, Main Entrance none  -       Row Name 12/23/24 1121          Stairs Within Home, Primary    Number of Stairs, Within Home, Primary none  -       Row Name 12/23/24 1121          Cognition    Orientation Status (Cognition)  oriented x 4  -       Row Name 12/23/24 1121          Safety Issues/Impairments Affecting Functional Mobility    Safety Issues Affecting Function (Mobility) safety precaution awareness;positioning of assistive device  -     Impairments Affecting Function (Mobility) postural/trunk control;endurance/activity tolerance  -               User Key  (r) = Recorded By, (t) = Taken By, (c) = Cosigned By      Initials Name Provider Type     Kirstin Feng PT Physical Therapist                   Mobility       Row Name 12/23/24 1123          Bed Mobility    Bed Mobility supine-sit  -     Supine-Sit [place] (Bed Mobility) modified independence  -     Assistive Device (Bed Mobility) head of bed elevated  -       Row Name 12/23/24 1123          Sit-Stand Transfer    Sit-Stand [place] (Transfers) modified independence  -     Assistive Device (Sit-Stand Transfers) walker, front-wheeled  -       Row Name 12/23/24 1123          Gait/Stairs (Locomotion)    [place] Level (Gait) standby assist  -     Assistive Device (Gait) walker, front-wheeled  -     Patient was able to Ambulate yes  -     Distance in Feet (Gait) 300  -     Deviations/Abnormal Patterns (Gait) bilateral deviations;binta decreased  -     Bilateral Gait Deviations forward flexed posture  -     [place] Level (Stairs) not tested  -               User Key  (r) = Recorded By, (t) = Taken By, (c) = Cosigned By      Initials Name Provider Type     Kirstin Feng, AVINASH Physical Therapist                   Obj/Interventions       Row Name 12/23/24 1123          Range of Motion Comprehensive    General Range of Motion bilateral lower extremity ROM WFL  -       Row Name 12/23/24 1123          Strength Comprehensive (MMT)    General Manual Muscle Testing (MMT) Assessment no strength deficits identified  -     Comment, General Manual Muscle Testing (MMT) Assessment BLE MMT WF  -       Row Name 12/23/24 1123          Balance     Balance Assessment sitting static balance;sitting dynamic balance;standing static balance;standing dynamic balance  -     Static Sitting Balance modified independence  -     Dynamic Sitting Balance modified independence  -     Position, Sitting Balance unsupported;sitting edge of bed  -     Static Standing Balance standby assist  -     Dynamic Standing Balance standby assist  -     Position/Device Used, Standing Balance supported;walker, front-wheeled  -       Row Name 12/23/24 1123          Sensory Assessment (Somatosensory)    Sensory Assessment (Somatosensory) LE sensation intact  -               User Key  (r) = Recorded By, (t) = Taken By, (c) = Cosigned By      Initials Name Provider Type     Kirstin Feng, PT Physical Therapist                   Goals/Plan    No documentation.                  Clinical Impression       Row Name 12/23/24 1124          Pain    Pretreatment Pain Rating 0/10 - no pain  -     Posttreatment Pain Rating 0/10 - no pain  -       Row Name 12/23/24 1124          Plan of Care Review    Plan of Care Reviewed With patient  -     Progress no change  -     Outcome Evaluation Pt participated in PT initial evaluation this date. Pt presents supine, asleep in bed. Pt pleasant and agreeable to PT evaluation. Pt AOx4, denies reports of pain at rest. Pt reports at baseline, he lives at an [facility] and uses a RW for all mobility. Pt denies reports of falls at home. Pt performed supine > sit on EOB with mod (I). Pt performed STS transfer with mod (I) and use of RW. Pt ambulated x300' with RW and SBA, no LOB noted. Pt required VC to maintain COM within RW during gait. BLE MMT WFL. Following evaluation, pt left seated in bedside chair with chair alarm active, call light and all needs within reach. At this time, pt appears to be at functional baseline. Skilled PT intervention is not indicated. Once medically stable, recommend pt to return to [facility] and attend OPPT for higher level  endurance/balance deficits. PT will sign off.  -       Row Name 12/23/24 1124          Therapy Assessment/Plan (PT)    Criteria for Skilled Interventions Met (PT) no;no problems identified which require skilled intervention  -     Therapy Frequency (PT) evaluation only  -       Row Name 12/23/24 1124          Vital Signs    Pretreatment Heart Rate (beats/min) 101  -HW     Pre SpO2 (%) 99  -HW     O2 Delivery Pre Treatment supplemental O2  2L  -HW     O2 Delivery Intra Treatment supplemental O2  -HW     Post SpO2 (%) 99  -HW     O2 Delivery Post Treatment supplemental O2  2L  -HW     Pre Patient Position Supine  -HW     Intra Patient Position Standing  -HW     Post Patient Position Sitting  -       Row Name 12/23/24 1124          Positioning and Restraints    Pre-Treatment Position in bed  -HW     Post Treatment Position chair  -HW     In Chair sitting;call light within reach;exit alarm on;encouraged to call for assist  -               User Key  (r) = Recorded By, (t) = Taken By, (c) = Cosigned By      Initials Name Provider Type     Kirstin Feng, PT Physical Therapist                   Outcome Measures       Row Name 12/23/24 1130 12/23/24 0826       How much help from another person do you currently need...    Turning from your back to your side while in flat bed without using bedrails? 4  -HW 4  -KJ    Moving from lying on back to sitting on the side of a flat bed without bedrails? 4  -HW 4  -KJ    Moving to and from a bed to a chair (including a wheelchair)? 3  -HW 4  -KJ    Standing up from a chair using your arms (e.g., wheelchair, bedside chair)? 4  -HW 3  -KJ    Climbing 3-5 steps with a railing? 3  -HW 2  -KJ    To walk in hospital room? 3  -HW 3  -KJ    AM-PAC 6 Clicks Score (PT) 21  -HW 20  -KJ    Highest Level of Mobility Goal 6 --> Walk 10 steps or more  -HW 6 --> Walk 10 steps or more  -KJ      Row Name 12/23/24 1130          Functional Assessment    Outcome Measure Options AM-PAC 6 Clicks  Basic Mobility (PT)  -               User Key  (r) = Recorded By, (t) = Taken By, (c) = Cosigned By      Initials Name Provider Type    Frances Noble, RN Registered Nurse     Kirstin Feng PT Physical Therapist                  Physical Therapy Education       Title: PT OT SLP Therapies (In Progress)       Topic: Physical Therapy (In Progress)       Point: Mobility training (Done)       Learning Progress Summary            Patient Acceptance, E,TB, VU by  at 12/23/2024 1130    Comment: role of PT during IP admission                      Point: Home exercise program (Not Started)       Learner Progress:  Not documented in this visit.              Point: Body mechanics (Not Started)       Learner Progress:  Not documented in this visit.              Point: Precautions (Not Started)       Learner Progress:  Not documented in this visit.                              User Key       Initials Effective Dates Name Provider Type Discipline     11/29/23 -  Kirstin Feng PT Physical Therapist PT                  PT Recommendation and Plan     Progress: no change  Outcome Evaluation: Pt participated in PT initial evaluation this date. Pt presents supine, asleep in bed. Pt pleasant and agreeable to PT evaluation. Pt AOx4, denies reports of pain at rest. Pt reports at baseline, he lives at an [facility] and uses a RW for all mobility. Pt denies reports of falls at home. Pt performed supine > sit on EOB with mod (I). Pt performed STS transfer with mod (I) and use of RW. Pt ambulated x300' with RW and SBA, no LOB noted. Pt required VC to maintain COM within RW during gait. BLE MMT WFL. Following evaluation, pt left seated in bedside chair with chair alarm active, call light and all needs within reach. At this time, pt appears to be at functional baseline. Skilled PT intervention is not indicated. Once medically stable, recommend pt to return to [facility] and attend OPPT for higher level endurance/balance deficits. PT will sign  off.     Time Calculation:   PT Evaluation Complexity  History, PT Evaluation Complexity: 1-2 personal factors and/or comorbidities  Examination of Body Systems (PT Eval Complexity): 1-2 elements  Clinical Presentation (PT Evaluation Complexity): stable  Clinical Decision Making (PT Evaluation Complexity): low complexity  Overall Complexity (PT Evaluation Complexity): low complexity     PT Charges       Row Name 12/23/24 1133             Time Calculation    Start Time 0930  -HW      PT Received On 12/23/24  -HW         Untimed Charges    PT Eval/Re-eval Minutes 39  -HW         Total Minutes    Untimed Charges Total Minutes 39  -HW       Total Minutes 39  -HW                User Key  (r) = Recorded By, (t) = Taken By, (c) = Cosigned By      Initials Name Provider Type     Kirstin Feng, AVINASH Physical Therapist                  Therapy Charges for Today       Code Description Service Date Service Provider Modifiers Qty    [id number]  PT EVAL LOW COMPLEXITY 3 12/23/2024 Kirstin Feng PT GP 1            PT G-Codes  Outcome Measure Options: AM-PAC 6 Clicks Basic Mobility (PT)  AM-PAC 6 Clicks Score (PT): 21    PT Discharge Summary  Anticipated Discharge Disposition (PT): home with outpatient therapy services, assisted living    Kirstin Feng PT  12/23/2024

## 2024-12-23 NOTE — THERAPY DISCHARGE NOTE
Acute Care - Occupational Therapy Discharge  [facility]    Patient Name: Blane Dietz  : 1935    MRN: [id number]                              Today's Date: 2024       Admit Date: 2024    Visit Dx:     ICD-10-CM ICD-9-CM   1. Acute on chronic congestive heart failure, unspecified heart failure type  I50.9 428.0     Patient Active Problem List   Diagnosis    Essential hypertension    Type 2 diabetes mellitus with hyperglycemia, with long-term current use of insulin    Prostate cancer    Aortic stenosis    Sleep apnea    S/P AVR    HLD (hyperlipidemia)    Renal mass    s/p right nepheroureterectomy and adrenalectomy     Diffuse large B-cell lymphoma of solid organ excluding spleen    PICC (peripherally inserted central catheter) flush    History of pulmonary embolism    History of malignant neoplasm of prostate    Lymphoma of kidney    Stage 3b chronic kidney disease    Pneumonia due to COVID-19 virus    Abrasion    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    Depression     Diabetes mellitus     Diverticulitis     Exogenous obesity     Gout     Heart murmur     History of vitamin D deficiency     Hypercholesteremia 2016    Hyperlipidemia     Hypertension     Impaired mobility     Kidney lesion     Malignant neoplasm of prostate     Morbid obesity 2016    Pneumonia 2021    Primary osteoarthritis involving multiple joints  06/13/2016    Impression: 03/08/2016 - stable;     Pulmonary embolism     Sleep apnea 05/12/2021    C-Pap    Uncontrolled type 2 diabetes mellitus with hyperglycemia 06/13/2016    Impression: 03/08/2016 - seeing Endo .;     Vertigo      Past Surgical History:   Procedure Laterality Date    CARDIAC VALVE REPLACEMENT  05/12/2021    Ross procedure 22 years ago    COLON SURGERY      COLONOSCOPY      COLOSTOMY  1999    COLOSTOMY REVISION      CYSTOSCOPY N/A 12/7/2021    Procedure: CYSTOSCOPY FLEXIBLE;  Surgeon: José Miguel Villafuerte Jr., MD;  Location:  [facility];  Service: Urology;  Laterality: N/A;    EXPLORATORY LAPAROTOMY      With Tissue Removal    LIPOMA EXCISION      MOHS SURGERY      NEPHROURETERECTOMY Right 12/7/2021    Procedure: RIGHT NEPHROURETERECTOMY LAPAROSCOPIC WITH DAVINCI ROBOT;  Surgeon: José Miguel Villafuerte Jr., MD;  Location:  [facility];  Service: Robotics - DaVinci;  Laterality: Right;    OTHER SURGICAL HISTORY      Porcine Valve    PROSTATE SURGERY      PROSTATECTOMY  2000     History of Prostatectomy Perineal Radical    SKIN CANCER EXCISION      from head and lip    TONSILLECTOMY        General Information       Row Name 12/23/24 1141          OT Time and Intention    Document Type discharge evaluation/summary  -DB     Mode of Treatment occupational therapy  -DB       Row Name 12/23/24 1141          General Information    Patient Profile Reviewed yes  -DB     Prior Level of Function independent:;ADL's  -DB     Existing Precautions/Restrictions fall;oxygen therapy device and L/min  -DB     Barriers to Rehab medically complex  -DB       Row Name 12/23/24 1141          Living Environment    People in Home other (see comments)  nursing home  -DB       Row Name 12/23/24 1141          Home Main Entrance    Number of Stairs, Main Entrance none  -DB       Row Name 12/23/24 1141          Stairs Within Home, Primary    Number of Stairs, Within Home, Primary none  -DB       Row Name 12/23/24 1141          Cognition     Orientation Status (Cognition) oriented x 4  -DB       Row Name 12/23/24 1141          Safety Issues/Impairments Affecting Functional Mobility    Safety Issues Affecting Function (Mobility) safety precaution awareness;positioning of assistive device  -DB     Impairments Affecting Function (Mobility) postural/trunk control;endurance/activity tolerance  -DB               User Key  (r) = Recorded By, (t) = Taken By, (c) = Cosigned By      Initials Name Provider Type    DB Horace Mariscal, OT Occupational Therapist                   Mobility/ADL's       Row Name 12/23/24 1142          Bed Mobility    Bed Mobility supine-sit  -DB     Supine-Sit [place] (Bed Mobility) modified independence  -DB     Assistive Device (Bed Mobility) head of bed elevated  -DB       Row Name 12/23/24 1142          Sit-Stand Transfer    Sit-Stand [place] (Transfers) modified independence  -DB     Assistive Device (Sit-Stand Transfers) walker, front-wheeled  -DB       Row Name 12/23/24 1142          Functional Mobility    Functional Mobility- Ind. Level standby assist  -DB     Functional Mobility- Device walker, front-wheeled  -DB     Functional Mobility-Distance (Feet) 300  -DB     Patient was able to Ambulate yes  -DB       Row Name 12/23/24 1142          Activities of Daily Living    BADL Assessment/Intervention bathing;upper body dressing;lower body dressing;grooming;toileting;feeding  -DB       Row Name 12/23/24 1142          Bathing Assessment/Intervention    [place] Level (Bathing) bathing skills;supervision;verbal cues  -DB       Row Name 12/23/24 1142          Upper Body Dressing Assessment/Training    [place] Level (Upper Body Dressing) upper body dressing skills;independent  -DB       Row Name 12/23/24 1142          Lower Body Dressing Assessment/Training    [place] Level (Lower Body Dressing) lower body dressing skills;minimum assist (75% patient effort)  -DB       Row Name 12/23/24 1142           Grooming Assessment/Training    [place] Level (Grooming) grooming skills;modified independence  -DB       Row Name 12/23/24 1142          Toileting Assessment/Training    [place] Level (Toileting) toileting skills;modified independence  -DB       Row Name 12/23/24 1142          Self-Feeding Assessment/Training    [place] Level (Feeding) feeding skills;independent  -DB               User Key  (r) = Recorded By, (t) = Taken By, (c) = Cosigned By      Initials Name Provider Type    DB Horace Mariscal, SAVANNAH Occupational Therapist                   Obj/Interventions       Row Name 12/23/24 1144          Vision Assessment/Intervention    Visual Impairment/Limitations WFL  -DB       Row Name 12/23/24 1144          Range of Motion Comprehensive    General Range of Motion bilateral upper extremity ROM WNL  -DB       Row Name 12/23/24 1144          Strength Comprehensive (MMT)    General Manual Muscle Testing (MMT) Assessment no strength deficits identified  -DB               User Key  (r) = Recorded By, (t) = Taken By, (c) = Cosigned By      Initials Name Provider Type    DB Horace Mariscal, OT Occupational Therapist                   Goals/Plan    No documentation.                  Clinical Impression       Row Name 12/23/24 1145          Pain Assessment    Pretreatment Pain Rating 0/10 - no pain  -DB     Posttreatment Pain Rating 0/10 - no pain  -DB       Row Name 12/23/24 1145          Plan of Care Review    Progress no change  -DB     Outcome Evaluation OT eval completed. Pt supine in bed upon OT arrival. Pt reports he lives in [facility] and is normally (I) with all ADLs. Pt reports he uses a RW for mobility. Pt able to complete supine to sit EOB with MI. Pt able to complete STS with MI and RW. Pt able to complete fxl mobility x300ft with SBA and RW. Pt able to complete toileting tasks with MI. Pt returned to chair in room with SBA and RW. Pt left with needs in reach and chair alarm activated. Pt does  not require skilled OT services at this time. OT recommends d/c back to [facility] once medically appropriate. OT to sign off.  -DB       Row Name 12/23/24 1145          Vital Signs    Pre SpO2 (%) 99  -DB     O2 Delivery Pre Treatment supplemental O2  -DB     O2 Delivery Intra Treatment supplemental O2  -DB     O2 Delivery Post Treatment supplemental O2  -DB     Pre Patient Position Supine  -DB     Intra Patient Position Standing  -DB     Post Patient Position Sitting  -DB       Row Name 12/23/24 1145          Positioning and Restraints    Pre-Treatment Position in bed  -DB     Post Treatment Position chair  -DB     In Chair notified nsg;reclined;call light within reach;encouraged to call for assist;exit alarm on  -DB               User Key  (r) = Recorded By, (t) = Taken By, (c) = Cosigned By      Initials Name Provider Type    Horace Mike, SAVANNAH Occupational Therapist                   Outcome Measures       Row Name 12/23/24 1151          How much help from another is currently needed...    Putting on and taking off regular lower body clothing? 4  -DB     Bathing (including washing, rinsing, and drying) 4  -DB     Toileting (which includes using toilet bed pan or urinal) 4  -DB     Putting on and taking off regular upper body clothing 4  -DB     Taking care of personal grooming (such as brushing teeth) 4  -DB     Eating meals 4  -DB     AM-PAC 6 Clicks Score (OT) 24  -DB       Row Name 12/23/24 1130 12/23/24 0826       How much help from another person do you currently need...    Turning from your back to your side while in flat bed without using bedrails? 4  -HW 4  -KJ    Moving from lying on back to sitting on the side of a flat bed without bedrails? 4  -HW 4  -KJ    Moving to and from a bed to a chair (including a wheelchair)? 3  -HW 4  -KJ    Standing up from a chair using your arms (e.g., wheelchair, bedside chair)? 4  -HW 3  -KJ    Climbing 3-5 steps with a railing? 3  -HW 2  -KJ    To walk in hospital  room? 3  -HW 3  -KJ    AM-PAC 6 Clicks Score (PT) 21  -HW 20  -KJ    Highest Level of Mobility Goal 6 --> Walk 10 steps or more  -HW 6 --> Walk 10 steps or more  -KJ      Row Name 12/23/24 1151 12/23/24 1130       Functional Assessment    Outcome Measure Options AM-PAC 6 Clicks Daily Activity (OT)  -DB AM-PAC 6 Clicks Basic Mobility (PT)  -HW              User Key  (r) = Recorded By, (t) = Taken By, (c) = Cosigned By      Initials Name Provider Type    Frances Noble, RN Registered Nurse    Horace Mike, OT Occupational Therapist    HW Kirstin Feng, PT Physical Therapist                  Occupational Therapy Education       Title: PT OT SLP Therapies (In Progress)       Topic: Occupational Therapy (In Progress)       Point: ADL training (Not Started)       Description:   Instruct learner(s) on proper safety adaptation and remediation techniques during self care or transfers.   Instruct in proper use of assistive devices.                  Learner Progress:  Not documented in this visit.              Point: Home exercise program (Not Started)       Description:   Instruct learner(s) on appropriate technique for monitoring, assisting and/or progressing therapeutic exercises/activities.                  Learner Progress:  Not documented in this visit.              Point: Precautions (Not Started)       Description:   Instruct learner(s) on prescribed precautions during self-care and functional transfers.                  Learner Progress:  Not documented in this visit.              Point: Body mechanics (Done)       Description:   Instruct learner(s) on proper positioning and spine alignment during self-care, functional mobility activities and/or exercises.                  Learning Progress Summary            Patient Acceptance, E,TB, VU by KWABENA at 12/23/2024 1157                                      User Key       Initials Effective Dates Name Provider Type Discipline    KWABENA 07/06/23 -  Davey  SAVANNAH Vu Occupational Therapist OT                  OT Recommendation and Plan     Plan of Care Review  Progress: no change  Outcome Evaluation: OT eval completed. Pt supine in bed upon OT arrival. Pt reports he lives in [facility] and is normally (I) with all ADLs. Pt reports he uses a RW for mobility. Pt able to complete supine to sit EOB with MI. Pt able to complete STS with MI and RW. Pt able to complete fxl mobility x300ft with SBA and RW. Pt able to complete toileting tasks with MI. Pt returned to chair in room with SBA and RW. Pt left with needs in reach and chair alarm activated. Pt does not require skilled OT services at this time. OT [facility] once medically appropriate. OT to sign off.  Outcome Evaluation: OT eval completed. Pt supine in bed upon OT arrival. [place] and is normally (I) with all ADLs. Pt reports he uses a RW for mobility. Pt able to complete supine to sit EOB with MI. Pt able to complete STS with MI and RW. Pt able to complete fxl mobility x300ft with SBA and RW. Pt able to complete toileting tasks with MI. Pt returned to chair in room with SBA and RW. Pt left with needs in reach and chair alarm activated. Pt does not require skilled OT services at this time. OT recommends d/c back to [facility] once medically appropriate. OT to sign off.     Time Calculation:   Evaluation Complexity (OT)  Review Occupational Profile/Medical/Therapy History Complexity: expanded/moderate complexity  Assessment, Occupational Performance/Identification of Deficit Complexity: 3-5 performance deficits  Clinical Decision Making Complexity (OT): detailed assessment/moderate complexity  Overall Complexity of Evaluation (OT): moderate complexity     Time Calculation- OT       Row Name 12/23/24 2535             Time Calculation- OT    OT Start Time 0925  -DB      OT Received On 12/23/24  -DB         Untimed Charges    OT Eval/Re-eval Minutes 45  -DB         Total Minutes    Untimed Charges  Total Minutes 45  -DB       Total Minutes 45  -DB                User Key  (r) = Recorded By, (t) = Taken By, (c) = Cosigned By      Initials Name Provider Type    Horace Mike OT Occupational Therapist                  Therapy Charges for Today       Code Description Service Date Service Provider Modifiers Qty    [id number] HC OT EVAL MOD COMPLEXITY 3 12/23/2024 Horace Mariscal OT GO 1                    Horace Mariscal OT  12/23/2024

## 2024-12-23 NOTE — PLAN OF CARE
Goal Outcome Evaluation:  Plan of Care Reviewed With: patient        Progress: no change  Outcome Evaluation: pt rested well throughout the night, vss.

## 2024-12-23 NOTE — DISCHARGE SUMMARY
[facility]      Name:  Blane Dietz   Age:  89 y.o.  Sex:  male  :  1935  MRN:  [id number]   Visit Number:  [id number]    Admission Date:  2024  Date of Discharge:  2024  Primary Care Physician:  David Em MD    Important issues to note:    -Continued on Eliquis continued Lasix for 3 more days  -follow-up with primary cardiologist  Dr. Toussaint,  cardiac monitor ordered  -follow-up with PCP    Discharge Diagnoses:     A-flutter with RVR  Chronic HFrEF (heart failure with reduced ejection fraction)  Type 2 diabetes mellitus with hyperglycemia, with long-term current use of insulin  Stage 3b chronic kidney disease  Valvular heart disease/aortic stenosis    Problem List:     Active Hospital Problems    Diagnosis  POA    **Atrial arrhythmia [I49.8]  Yes    Chronic HFrEF (heart failure with reduced ejection fraction) [I50.22]  Yes    Valvular heart disease [I38]  Yes    Stage 3b chronic kidney disease [N18.32]  Yes    Type 2 diabetes mellitus with hyperglycemia, with long-term current use of insulin [E11.65, Z79.4]  Not Applicable      Resolved Hospital Problems   No resolved problems to display.     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  Chief complaint: Shortness of breath. Increased shortness of breath over the last 24 hours.  Associated with leg swelling. He does report that he has been off anticoagulation.  Was given  80 mg IV Lasix in the ED he was maintaining oxygen sats and was planning for discharge made a loop around the ED and started feeling funny they put him back on the monitor and found he was having RVR.  After which family did not feel comfortable taking him home. Discussed his case with Dr. Yun who recommended we admit and continue amiodarone, metoprolol. Not sure why he was taken off blood thinners. Full code.     Pertinent findings: Highly sensitive troponin 37 and then 31 which is at or near baseline, creatinine 0.46 which is at or near baseline, glucose 232, hemoglobin 14.1, flu negative, BNP 8775, chest x-ray shows a trace left and small right pleural effusion shows right airspace disease in the lower lobe perhaps somewhat magnified by the pacer pad, CTA of the chest showing no PE stable aneurysm stable small bilateral effusions and lower lobe consolidation pneumonia versus atelectasis, cirrhotic appearing liver.  EKG shows a regular narrow complex tachycardia with a rate of about 175 associated interventricular conduction delay nonspecific ST and T wave changes, initial EKG shows, sinus rhythm with a right bundle branch block with nonspecific T wave changes prominent U waves. Review of telemetry shows Wide complex at baseline which flipped axis during his episode of tachycardia.    A-flutter with RVR  Atrial arrhythmia   -Cardiology consulted on the patient, appreciate recommendations  -TSH and free T4 are normal  -Continue amiodarone and metoprolol  -Reloaded with IV amnio in the ER  -[id number] score is 6  -Started anticoagulation with Eliquis 5 mg p.o. twice daily, discontinued therapeutic Lovenox  -Follow-up with outpatient cardiologist to discuss flutter ablation  -MCOT at the time of discharge  -May need a comprehensive EP study as monomorphic VT could not be ruled out  -Cleared for discharge by Dr. Yun with cardiology.  -PT/OT consulted, patient ambulatory with no difficulty     Chronic HFrEF  (heart failure with reduced ejection fraction)  -Does not appear significantly exacerbated, CT imaging stable, BNP stable.    -Previous echocardiogram showed EF of 45 to 50%  -Continue diuretics  -Continue Jardiance, beta-blocker  -Continue Entresto  -Repeat echocardiogram shows EF of 38% with moderate aortic and mitral valve regurgitation  -Continue close follow-up with outpatient cardiologist  -Patient diuresed well currently at -4.2 L balance  - patient titrated down on supplemental oxygen and was saturating above 97% on room air.     Aortic stenosis  -Status post Ross procedure  -Echocardiogram shows at least moderate aortic valve regurgitation  -Continue follow-up with outpatient cardiology.  -Continue diuresis     Type 2 diabetes mellitus with hyperglycemia, with long-term current use of insulin  Stage 3b chronic kidney disease  Valvular heart disease  - Chronic medical problems, would benefit from resuming anticoagulation if possible  - Active Treatments: Subcutaneous insulin,     Patient was seen and examined on the day of discharge.  Patient sitting up comfortably in the chair and appears with no distress.  Patient reports feeling much better improved and is eager to go home, patient lives at [facility].  Patient ambulatory with no difficulty.  Tolerating p.o. well.  Denies any acute complaints, shortness of breath or discomfort.  Patient is titrated down on room air and saturating at above 97% with no supplemental oxygen.  He has oxygen at home that he uses at night and has a CPAP for nighttime.    Patient instructed on management and plan in details.  Discussed with him following up with his cardiologist.  Cardiac monitor ordered.  Discussed Eliquis and Lasix. Patient to follow-up with PCP in 2 to 3 days for recheck and continued care. Clear return precautions discussed.  Patient verbalized understanding and agreeable to plan.  All questions were answered to the patient's satisfaction.  Patient has  reached maximum medical improvement of inpatient hospital stay. Patient is discharged in stable condition.    Vital Signs:    Temp:  [97.5 °F (36.4 °C)-98 °F (36.7 °C)] 98 °F (36.7 °C)  Heart Rate:  [] 96  Resp:  [16-18] 18  BP: (104-131)/(64-85) 104/64    Physical Exam:    General Appearance:  Alert and cooperative.  No acute distress.   Head:  Atraumatic and normocephalic.   Eyes: Conjunctivae and sclerae normal, no icterus. No pallor.   Ears:  Ears with no abnormalities noted.   Throat: No oral lesions, no thrush, oral mucosa moist.   Neck: Supple, trachea midline, no thyromegaly.   Back:   No kyphoscoliosis present. No tenderness to palpation.   Lungs:   Breath sounds heard bilaterally equally.  No crackles or wheezing. No Pleural rub or bronchial breathing.   Heart:  Normal S1 and S2, no murmur, no gallop, no rub. No JVD.   Abdomen:   Normal bowel sounds, no masses, no organomegaly. Soft, nontender, nondistended, no rebound tenderness.   Extremities: Supple, no edema, no cyanosis, no clubbing.   Pulses: Pulses palpable bilaterally.   Skin: No bleeding or rash.   Neurologic: Alert and oriented x 3. No facial asymmetry. Moves all four limbs. No tremors.     Pertinent Lab Results:     Results from last 7 days   Lab Units 12/22/24  0609 12/21/24  1855   SODIUM mmol/L 143 138   POTASSIUM mmol/L 4.4 4.1   CHLORIDE mmol/L 107 101   CO2 mmol/L 28.4 23.0   BUN mg/dL 30* 30*   CREATININE mg/dL 1.46* 1.46*   CALCIUM mg/dL 8.9 8.8   BILIRUBIN mg/dL 1.1 1.5*   ALK PHOS U/L 96 103   ALT (SGPT) U/L 10 11   AST (SGOT) U/L 19 21   GLUCOSE mg/dL 122* 232*     Results from last 7 days   Lab Units 12/22/24  0609 12/21/24  1855   WBC 10*3/mm3 6.02 6.49   HEMOGLOBIN g/dL 13.2 14.1   HEMATOCRIT % 41.3 43.3   PLATELETS 10*3/mm3 105* 108*         Results from last 7 days   Lab Units 12/21/24 2000 12/21/24  1855   HSTROP T ng/L 31* 37*     Results from last 7 days   Lab Units 12/21/24  1855   PROBNP pg/mL 8,775.0*                        Pertinent Radiology Results:    Imaging Results (All)       Procedure Component Value Units Date/Time    XR Chest 1 View [[id number]] Collected: 12/22/24 1007     Updated: 12/22/24 1010    Narrative:      PROCEDURE: XR CHEST 1 VW-     HISTORY: Shortness of breath     COMPARISON: November 29, 2023.     FINDINGS: Sternal wires are present. Atherosclerosis is noted. The heart  is enlarged. The mediastinum is unremarkable. Bibasilar opacities and  probable effusions. There is no pneumothorax.  There are no acute  osseous abnormalities. There is evidence of calcified granulomatous  disease.       Impression:      Bibasilar opacities and probable effusions.     Continued followup is recommended.           This report was signed and finalized on 12/22/2024 10:08 AM by Blane Shukla DO.       CT Angiogram Chest Pulmonary Embolism [[id number]] Collected: 12/21/24 2008     Updated: 12/21/24 2009    Narrative:      FINAL REPORT    TECHNIQUE:  null    CLINICAL HISTORY:  Shortness of breath x 1 week, recent respiratory illness 2  months ago, history of PE, eval PE    COMPARISON:  null    FINDINGS:  Exam: CTA Chest with IV contrast.    Procedure: Contrast was administered. Coronal and sagittal MIP reformats were performed    Comparison: 11/21/2024    Clinical history: Shortness of breath x1 week. Recent respiratory illness 2 months ago. History of PE. Eval PE    Findings:    Evaluation for PE in the subsegmental arteries of the lower lobes bilaterally is limited due to suboptimal opacification. No pulmonary emboli identified elsewhere in the chest.    Prior median sternotomy and CABG.    Ascending thoracic aortic aneurysm measuring 4.4 cm is stable compared to 11/21/2024    No thoracic aortic aneurysm.    No hilar or mediastinal adenopathy.    Cardiomegaly    No pericardial fluid collection.    No pneumothorax.    Small bilateral pleural fluid collections are similar to prior exam.    Bilateral lower lobe consolidation may  represent atelectasis or pneumonia.    Nodular cirrhotic appearing liver, unchanged .    Visualized liver, spleen and pancreas do not demonstrate any acute process.    No gallstones.    No thoracic spine compression fractures      Impression:      Impression:    1. Evaluation for PE in the subsegmental arteries of the lower lobes bilaterally is limited due to suboptimal opacification. No pulmonary emboli identified elsewhere in the chest.    2. Ascending thoracic aortic aneurysm is unchanged in size compared to prior exam. No contrast within the aorta.    3. Stable small bilateral pleural fluid collections. Bilateral lower lobe consolidation may represent pneumonia or atelectasis.    4. Nodular cirrhotic appearing liver, unchanged.    Authenticated and Electronically Signed by Leatha Velasco MD  on 12/21/2024 08:08:28 PM            Echo:    Results for orders placed during the hospital encounter of 12/21/24    Adult Transthoracic Echo Complete W/ Cont if Necessary Per Protocol    Interpretation Summary    Left ventricular systolic function is moderately decreased. Calculated left ventricular EF = 38.2% Left ventricular ejection fraction appears to be 36 - 40%.    The left ventricular cavity is mildly dilated.    Left ventricular diastolic dysfunction is noted.    Mildly reduced right ventricular systolic function noted.    The right ventricular cavity is mildly dilated.    The left atrial cavity is dilated.    The right atrial cavity is dilated.    Moderate aortic valve regurgitation is present.  Patient has a history of Ross procedure.    Moderate mitral valve regurgitation is present.    Estimated right ventricular systolic pressure from tricuspid regurgitation is normal (<35 mmHg).    Condition on Discharge:      Stable.    Code status during the hospital stay:    Code Status and Medical Interventions: CPR (Attempt to Resuscitate); Full Support   Ordered at: 12/21/24 2202     Code Status (Patient has no pulse  "and is not breathing):    CPR (Attempt to Resuscitate)     Medical Interventions (Patient has pulse or is breathing):    Full Support     Discharge Disposition:    Home or Self Care    Discharge Medications:       Discharge Medications        New Medications        Instructions Start Date   furosemide 20 MG tablet  Commonly known as: LASIX   20 mg, Oral, 2 Times Daily             Changes to Medications        Instructions Start Date   fluticasone 50 MCG/ACT nasal spray  Commonly known as: Flonase  What changed:   when to take this  reasons to take this  additional instructions   2 sprays, Nasal, Daily, 2 puffs each nostril      Lantus 100 UNIT/ML injection  Generic drug: insulin glargine  What changed: additional instructions   Inject up to 50 units daily subcutaneously             Continue These Medications        Instructions Start Date   allopurinol 100 MG tablet  Commonly known as: ZYLOPRIM   100 mg, Oral, Daily      amiodarone 200 MG tablet  Commonly known as: PACERONE   200 mg, Daily      ascorbic acid 1000 MG tablet  Commonly known as: VITAMIN C   1,000 mg, Daily      Calcium Carb-Cholecalciferol 600-5 MG-MCG tablet   1 tablet, Oral, Every Other Day      ciclopirox 1 % shampoo  Commonly known as: LOPROX       CINNAMON PO   Take  by mouth.      Droplet Insulin Syringe 31G X 5/16\" 0.5 ML misc  Generic drug: Insulin Syringe-Needle U-100       Entresto 24-26 MG tablet  Generic drug: sacubitril-valsartan   TAKE ONE TABLET BY MOUTH EVERY 12 HOURS      fluocinonide 0.05 % external solution  Commonly known as: LIDEX       Jardiance 25 MG tablet tablet  Generic drug: empagliflozin   25 mg, Oral, Daily      ketoconazole 2 % shampoo  Commonly known as: NIZORAL   2 Times Weekly      metoprolol succinate XL 25 MG 24 hr tablet  Commonly known as: TOPROL-XL   12.5 mg, Oral, Daily      multivitamin with minerals tablet tablet   1 tablet, Daily      pravastatin 40 MG tablet  Commonly known as: PRAVACHOL   TAKE ONE TABLET BY " MOUTH EVERY NIGHT AT BEDTIME      PREVAGEN PO   Take  by mouth.      triamcinolone 0.1 % cream  Commonly known as: KENALOG   1 Application, 2 Times Daily      True Metrix Blood Glucose Test test strip  Generic drug: glucose blood   Use to test blood sugar 3 times daily.  Dx E11.65      Trulicity 3 MG/0.5ML solution pen-injector  Generic drug: Dulaglutide   3 mg, Subcutaneous, Weekly      VITAMIN D (CHOLECALCIFEROL) PO   Take  by mouth.      Xtandi 40 MG tablet tablet  Generic drug: enzalutamide   Take 3 tablets by mouth Daily.             Discharge Diet:   Cardiac    Activity at Discharge:   As tolerated    Follow-up Appointments:    Additional Instructions for the Follow-ups that You Need to Schedule       Ambulatory Referral to Disease State Management   As directed      To dept: [facility] [[id number]]   What program(s) are you referring for?: Heart Failure   Follow-up needed: Yes               Follow-up Information       David Em MD. Schedule an appointment as soon as possible for a visit .    Specialty: Internal Medicine  Contact information:  [address]  [facility] [address] [phone]               Ha Toussaint MD Follow up in 1 week(s).    Specialty: Cardiology  Contact information:  [address]  [facility] [id number] [phone]                           Future Appointments   Date Time Provider Department Center   12/26/2024 10:30 AM Sophia Ferreira PA [facility] END BM [facility]   1/3/2025 10:30 AM Olimpia Real APRN [facility] ONC [facility]   1/3/2025 12:45 PM TREATMENT RM 14  [facility] OP INFUS  [facility] OPI [facility]   1/15/2025  2:00 PM TREATMENT RM 3  [facility]ARE[facility] OP INFUS  [facility]ARE[facility] OPI FARE[facility]     Test Results Pending at Discharge:    Pending Results       Procedure [Order ID] Specimen - Date/Time    ECG 12 Lead Rhythm Change [[id number]]                  Deanne Bowles MD  12/23/24  12:47 EST    Time: I spent 28 minutes on this discharge activity which included: face-to-face encounter  with the patient, reviewing the data in the system, coordination of the care with the nursing staff as well as consultants, documentation, and entering orders.     Dictated utilizing Dragon dictation.

## 2024-12-23 NOTE — PROGRESS NOTES
Dietitian Assessment    Patient Name: Blane Dietz  YOB: 1935  MRN: [id number]  Admission date: 12/21/2024    Comment:        Clinical Nutrition Assessment      Reason for Assessment MST score 2+   H&P  Past Medical History:   Diagnosis Date    Aortic stenosis     status post ROSS procedure, remote, with December 2015 echocardiography revealing normal aortic and pulmonary valve function, normal ejection fraction and mild to moderate MR    Arthritis     Bilateral impacted cerumen 11/23/2016    Bronchitis     CHF (congestive heart failure)     Chronic gout of right foot 06/13/2016    Impression: 03/08/2016 - stable.;     COVID-19 vaccine series completed 05/12/2021    [place] 2nd dose 3/12/21.    Depression     Diabetes mellitus     Diverticulitis     Exogenous obesity     Gout     Heart murmur     History of vitamin D deficiency     Hypercholesteremia 08/11/2016    Hyperlipidemia     Hypertension     Kidney lesion     Malignant neoplasm of prostate     Morbid obesity 08/11/2016    Pneumonia 05/12/2021    Primary osteoarthritis involving multiple joints 06/13/2016    Impression: 03/08/2016 - stable;     Pulmonary embolism     Sleep apnea 05/12/2021    C-Pap    Uncontrolled type 2 diabetes mellitus with hyperglycemia 06/13/2016    Impression: 03/08/2016 - seeing Endo .;     Vertigo        Past Surgical History:   Procedure Laterality Date    CARDIAC VALVE REPLACEMENT  05/12/2021    Ross procedure 22 years ago    COLON SURGERY      COLONOSCOPY      COLOSTOMY  1999    COLOSTOMY REVISION      CYSTOSCOPY N/A 12/7/2021    Procedure: CYSTOSCOPY FLEXIBLE;  Surgeon: José Miguel Villafuerte Jr., MD;  Location: [facility] OR;  Service: Urology;  Laterality: N/A;    EXPLORATORY LAPAROTOMY      With Tissue Removal    LIPOMA EXCISION      MOHS SURGERY      NEPHROURETERECTOMY Right 12/7/2021    Procedure: RIGHT NEPHROURETERECTOMY LAPAROSCOPIC WITH DAVINCI ROBOT;  Surgeon: José Miguel Villafuerte Jr., MD;  Location: [facility]  "OR;  Service: Robotics - DaVinci;  Laterality: Right;    OTHER SURGICAL HISTORY      Porcine Valve    PROSTATE SURGERY      PROSTATECTOMY  2000     History of Prostatectomy Perineal Radical    SKIN CANCER EXCISION      from head and lip    TONSILLECTOMY              Current Problems   Patient Active Problem List   Diagnosis    Essential hypertension    Type 2 diabetes mellitus with hyperglycemia, with long-term current use of insulin    Prostate cancer    Aortic stenosis    Sleep apnea    S/P AVR    HLD (hyperlipidemia)    Renal mass    s/p right nepheroureterectomy and adrenalectomy     Diffuse large B-cell lymphoma of solid organ excluding spleen    PICC (peripherally inserted central catheter) flush    History of pulmonary embolism    History of malignant neoplasm of prostate    Lymphoma of kidney    Stage 3b chronic kidney disease    Pneumonia due to COVID-19 virus    Abrasion    Chronic HFrEF (heart failure with reduced ejection fraction)    Valvular heart disease    Atrial arrhythmia        Encounter Information        Trending Narrative     12/23: Pt with MST=2 d/t eating poorly and 2-13# wt loss. Pt has had wt loss of 2# (1%) within 1 day. However, wt loss is intentional r/t diuretic use. Pt does not have any significant wt changes over the last year. Average PO intake 75% x 4 meals. ONS is not warranted at this time. RD will order phosphorus to see if diet can be liberalized.      Anthropometrics        Current Height, Weight Height: 168.9 cm (66.5\")  Weight: 87 kg (191 lb 12.8 oz) (12/22/24 0857)   Trending Weight Hx     This admission:              PTA:     Wt Readings from Last 30 Encounters:   12/22/24 0857 87 kg (191 lb 12.8 oz)   12/22/24 0410 87 kg (191 lb 12.8 oz)   12/21/24 2233 87.8 kg (193 lb 9 oz)   11/13/24 1335 90.3 kg (199 lb)   10/16/24 1427 90.3 kg (199 lb)   10/04/24 1506 83.9 kg (185 lb)   09/18/24 1414 89.9 kg (198 lb 4.8 oz)   08/14/24 1352 91.6 kg (201 lb 14.4 oz)   08/01/24 1311 89.4 " kg (197 lb)   07/25/24 1518 88.9 kg (196 lb)   07/17/24 1045 91.8 kg (202 lb 4.8 oz)   06/19/24 1439 91 kg (200 lb 9.6 oz)   05/22/24 1402 90.4 kg (199 lb 3.2 oz)   04/29/24 1305 88.5 kg (195 lb 3.2 oz)   04/24/24 1333 90.7 kg (200 lb)   04/18/24 1312 90.7 kg (200 lb)   04/04/24 1352 89.4 kg (197 lb)   02/21/24 1412 88.5 kg (195 lb)   01/10/24 1355 88 kg (194 lb)   12/15/23 1430 83.9 kg (185 lb)   12/13/23 1435 83.9 kg (185 lb)   11/29/23 1336 86.2 kg (190 lb)   11/28/23 1810 86.2 kg (190 lb)   11/27/23 1923 86.2 kg (190 lb)   11/25/23 1421 86.2 kg (190 lb)   11/22/23 1906 86.2 kg (190 lb)   11/01/23 1510 86.2 kg (190 lb)   09/29/23 1305 90.2 kg (198 lb 12.8 oz)   09/27/23 1402 89.4 kg (197 lb)   09/11/23 1352 88.5 kg (195 lb)   07/20/23 1448 87.5 kg (192 lb 12.8 oz)   06/30/23 1557 88.5 kg (195 lb)      BMI kg/m2 Body mass index is 30.5 kg/m².     Labs        Pertinent Labs     Results from last 7 days   Lab Units 12/22/24  0609 12/21/24  1855   SODIUM mmol/L 143 138   POTASSIUM mmol/L 4.4 4.1   CHLORIDE mmol/L 107 101   CO2 mmol/L 28.4 23.0   BUN mg/dL 30* 30*   CREATININE mg/dL 1.46* 1.46*   CALCIUM mg/dL 8.9 8.8   BILIRUBIN mg/dL 1.1 1.5*   ALK PHOS U/L 96 103   ALT (SGPT) U/L 10 11   AST (SGOT) U/L 19 21   GLUCOSE mg/dL 122* 232*       Results from last 7 days   Lab Units 12/22/24  0609 12/21/24  1855   MAGNESIUM mg/dL  --  2.2   HEMOGLOBIN g/dL 13.2 14.1   HEMATOCRIT % 41.3 43.3       Lab Results   Component Value Date    HGBA1C 9.2 (A) 07/25/2024            Medications       Scheduled Medications allopurinol, 100 mg, Oral, Daily  amiodarone, 200 mg, Oral, Daily  apixaban, 5 mg, Oral, Q12H  ascorbic acid, 1,000 mg, Oral, Daily  Calcium Carb-Cholecalciferol, 1 tablet, Oral, Once per day on Monday Wednesday Friday  empagliflozin, 25 mg, Oral, Daily  furosemide, 40 mg, Intravenous, BID  insulin glargine, 1-200 Units, Subcutaneous, Nightly - Glucommander  insulin lispro, 1-200 Units, Subcutaneous, 4x Daily With  Meals & Nightly  metoprolol succinate XL, 12.5 mg, Oral, Daily  multivitamin with minerals, 1 tablet, Oral, Daily  pravastatin, 40 mg, Oral, Daily  sacubitril-valsartan, 1 tablet, Oral, Q12H  sodium chloride, 10 mL, Intravenous, Q12H        Infusions Pharmacy Consult - Pharmacy to dose,   Pharmacy to Dose enoxaparin (LOVENOX),          PRN Medications   acetaminophen    senna-docusate sodium **AND** polyethylene glycol **AND** bisacodyl **AND** bisacodyl    calcium carbonate    Calcium Replacement - Follow Nurse / BPA Driven Protocol    dextrose    dextrose    fluticasone    glucagon (human recombinant)    insulin lispro    Magnesium Standard Dose Replacement - Follow Nurse / BPA Driven Protocol    metoprolol tartrate    nitroglycerin    ondansetron ODT **OR** ondansetron    Pharmacy Consult - Pharmacy to dose    Pharmacy to Dose enoxaparin (LOVENOX)    Phosphorus Replacement - Follow Nurse / BPA Driven Protocol    Potassium Replacement - Follow Nurse / BPA Driven Protocol    sodium chloride    sodium chloride     Physical Findings        Trending Physical   Appearance, NFPE    --  Edema  2+ (mild)   Bowel Function LBM: 12/22/24   Tubes Peripheral IV   Chewing/Swallowing WNL   Skin Intact     Estimated/Assessed Needs       Energy Requirements    EST Needs, Method, Wt used 7404-7974 kcal (MSJ 1.2-1.3)       Protein Requirements    EST Needs, Method, Wt used 52-70 g pro (.6-.8 g pro/kg)       Fluid Requirements     Estimated Needs (mL/day) 7898-9739 mL/day       Current Nutrition Orders & Evaluation of Intake       Oral Nutrition     Food Allergies NKFA   Current PO Diet Diet: Cardiac, Diabetic, Renal; Healthy Heart (2-3 Na+); Consistent Carbohydrate; Low Sodium (2-3g), Low Potassium, Low Phosphorus; Fluid Consistency: Thin (IDDSI 0)   Supplement    PO Evaluation     Trending % PO Intake 12/23: 75% x 4 meals     Enteral Nutrition    Enteral Route    Order, Modulars, Flushes    Residual/Tolerance    TF Observation          Parenteral Nutrition     TPN Route    Total # Days on TPN    TPN Order, Lipid Details    MVI & Trace Element Freq    TPN Observation       Nutrition Diagnosis         Nutrition Dx Problem 1 Altered nutrition lab value r/t DM AEB A1C=9.2 and elevated glucose upon admission.      Nutrition Dx Problem 2        Intervention Goal         Intervention Goal(s) No significant wt changes  PO intake meet >50% of estimated needs  Glucose WNL     Nutrition Intervention        RD Action Recommend/order: phosphorus lab     Nutrition Prescription          Diet Prescription Diet: Cardiac, Diabetic, Renal; Healthy Heart (2-3 Na+); Consistent Carbohydrate; Low Sodium (2-3g), Low Potassium, Low Phosphorus; Fluid Consistency: Thin (IDDSI 0)   Supplement Prescription      Enteral Prescription        TPN Prescription      Monitor/Evaluation        Monitor Per protocol, I&O, PO intake, Pertinent labs, Weight, Skin status, GI status, Symptoms, POC/GOC, Swallow function     RD to f/up    Electronically signed by:  Sophia Zhou RD  12/23/24 09:17 EST

## 2024-12-24 ENCOUNTER — TRANSITIONAL CARE MANAGEMENT TELEPHONE ENCOUNTER (OUTPATIENT)
Dept: CALL CENTER | Facility: HOSPITAL | Age: 89
End: 2024-12-24
Payer: MEDICARE

## 2024-12-24 NOTE — OUTREACH NOTE
Prep Survey      Flowsheet Row Responses   [facility] facility patient discharged from? [facility]   Is LACE score < 7 ? No   Eligibility [facility]   Date of Admission 12/21/24   Date of Discharge 12/23/24   Discharge Disposition Home or Self Care   Discharge diagnosis Atrial arrhythmia   Does the patient have one of the following disease processes/diagnoses(primary or secondary)? Other   Does the patient have Home health ordered? No   Is there a DME ordered? No   Prep survey completed? Yes            NAHOMY HODGSON - Registered Nurse

## 2024-12-24 NOTE — OUTREACH NOTE
Call Center TCM Note      Flowsheet Row Responses   [facility] [place] patient discharged from? [facility]   Does the patient have one of the following disease processes/diagnoses(primary or secondary)? Other   TCM attempt successful? Yes   Call start time 1031   Call end time 1035   Discharge diagnosis Atrial arrhythmia   Person spoke with today (if not patient) and relationship Susana MAYORGA   Meds reviewed with patient/caregiver? Yes   Is the patient having any side effects they believe may be caused by any medication additions or changes? No   Does the patient have all medications ordered at discharge? Yes   Is the patient taking all medications as directed (includes completed medication regime)? Yes   Comments PCP appt on 1/3 @ 2:45.  Endo appt on 12/26, Oncology appt on 1/3 @ 10:00   Does the patient have an appointment with their PCP within 7-14 days of discharge? Yes   Psychosocial issues? No   Did the patient receive a copy of their discharge instructions? Yes   Nursing interventions Reviewed instructions with patient   What is the patient's perception of their health status since discharge? Improving   Is the patient/caregiver able to teach back signs and symptoms related to disease process for when to call PCP? Yes   Is the patient/caregiver able to teach back signs and symptoms related to disease process for when to call 911? Yes   Is the patient/caregiver able to teach back the hierarchy of who to call/visit for symptoms/problems? PCP, Specialist, Home health nurse, Urgent Care, ED, 911 Yes   If the patient is a current smoker, are they able to teach back resources for cessation? Not a smoker   TCM call completed? Yes   Wrap up additional comments States he is doing well when she took him home last night.  She picked up his meds on the way home.   Call end time 1035            Lyndsay Cruz LPN    12/24/2024, 10:36 EST

## 2025-01-06 ENCOUNTER — READMISSION MANAGEMENT (OUTPATIENT)
Dept: CALL CENTER | Facility: HOSPITAL | Age: 89
End: 2025-01-06
Payer: MEDICARE

## 2025-01-06 NOTE — OUTREACH NOTE
Medical Week 2 Survey      Flowsheet Row Responses   [facility] facility patient discharged from? [facility]   Does the patient have one of the following disease processes/diagnoses(primary or secondary)? Other   Week 2 attempt successful? No   Unsuccessful attempts Attempt 1   Revoke Other  [in ACM program]            Palak WAYNE - Registered Nurse

## 2025-01-16 ENCOUNTER — HOSPITAL ENCOUNTER (EMERGENCY)
Facility: HOSPITAL | Age: 89
Discharge: HOME | End: 2025-01-16
Attending: EMERGENCY MEDICINE
Payer: MEDICARE

## 2025-01-16 ENCOUNTER — APPOINTMENT (OUTPATIENT)
Dept: GENERAL RADIOLOGY | Facility: HOSPITAL | Age: 89
End: 2025-01-16
Payer: MEDICARE

## 2025-01-16 VITALS
TEMPERATURE: 97.6 F | BODY MASS INDEX: 30.1 KG/M2 | RESPIRATION RATE: 18 BRPM | HEIGHT: 67 IN | OXYGEN SATURATION: 99 % | WEIGHT: 191.8 LBS | DIASTOLIC BLOOD PRESSURE: 94 MMHG | SYSTOLIC BLOOD PRESSURE: 131 MMHG | HEART RATE: 105 BPM

## 2025-01-16 DIAGNOSIS — I50.9 CONGESTIVE HEART FAILURE, UNSPECIFIED HF CHRONICITY, UNSPECIFIED HEART FAILURE TYPE: ICD-10-CM

## 2025-01-16 DIAGNOSIS — I48.91 ATRIAL FIBRILLATION WITH RAPID VENTRICULAR RESPONSE: Primary | ICD-10-CM

## 2025-01-16 LAB
ALBUMIN SERPL-MCNC: 3.8 G/DL (ref 3.5–5.2)
ALBUMIN/GLOB SERPL: 1.6 G/DL
ALP SERPL-CCNC: 89 U/L (ref 39–117)
ALT SERPL W P-5'-P-CCNC: 11 U/L (ref 1–41)
ANION GAP SERPL CALCULATED.3IONS-SCNC: 14.5 MMOL/L (ref 5–15)
AST SERPL-CCNC: 28 U/L (ref 1–40)
BASOPHILS # BLD AUTO: 0.08 10*3/MM3 (ref 0–0.2)
BASOPHILS NFR BLD AUTO: 1.2 % (ref 0–1.5)
BILIRUB SERPL-MCNC: 2.3 MG/DL (ref 0–1.2)
BUN SERPL-MCNC: 25 MG/DL (ref 8–23)
BUN/CREAT SERPL: 18.7 (ref 7–25)
CALCIUM SPEC-SCNC: 9 MG/DL (ref 8.6–10.5)
CHLORIDE SERPL-SCNC: 106 MMOL/L (ref 98–107)
CO2 SERPL-SCNC: 21.5 MMOL/L (ref 22–29)
CREAT SERPL-MCNC: 1.34 MG/DL (ref 0.76–1.27)
DEPRECATED RDW RBC AUTO: 55 FL (ref 37–54)
EGFRCR SERPLBLD CKD-EPI 2021: 51.3 ML/MIN/1.73
EOSINOPHIL # BLD AUTO: 0.15 10*3/MM3 (ref 0–0.4)
EOSINOPHIL NFR BLD AUTO: 2.3 % (ref 0.3–6.2)
ERYTHROCYTE [DISTWIDTH] IN BLOOD BY AUTOMATED COUNT: 16.3 % (ref 12.3–15.4)
GEN 5 1HR TROPONIN T REFLEX: 44 NG/L
GLOBULIN UR ELPH-MCNC: 2.4 GM/DL
GLUCOSE SERPL-MCNC: 182 MG/DL (ref 65–99)
HCT VFR BLD AUTO: 43.3 % (ref 37.5–51)
HGB BLD-MCNC: 13.9 G/DL (ref 13–17.7)
HOLD SPECIMEN: NORMAL
HOLD SPECIMEN: NORMAL
IMM GRANULOCYTES # BLD AUTO: 0.09 10*3/MM3 (ref 0–0.05)
IMM GRANULOCYTES NFR BLD AUTO: 1.4 % (ref 0–0.5)
LYMPHOCYTES # BLD AUTO: 1.25 10*3/MM3 (ref 0.7–3.1)
LYMPHOCYTES NFR BLD AUTO: 19.4 % (ref 19.6–45.3)
MAGNESIUM SERPL-MCNC: 1.8 MG/DL (ref 1.6–2.4)
MCH RBC QN AUTO: 29.8 PG (ref 26.6–33)
MCHC RBC AUTO-ENTMCNC: 32.1 G/DL (ref 31.5–35.7)
MCV RBC AUTO: 92.9 FL (ref 79–97)
MONOCYTES # BLD AUTO: 0.52 10*3/MM3 (ref 0.1–0.9)
MONOCYTES NFR BLD AUTO: 8.1 % (ref 5–12)
NEUTROPHILS NFR BLD AUTO: 4.36 10*3/MM3 (ref 1.7–7)
NEUTROPHILS NFR BLD AUTO: 67.6 % (ref 42.7–76)
NRBC BLD AUTO-RTO: 0 /100 WBC (ref 0–0.2)
PLATELET # BLD AUTO: 109 10*3/MM3 (ref 140–450)
PMV BLD AUTO: 11 FL (ref 6–12)
POTASSIUM SERPL-SCNC: 4.6 MMOL/L (ref 3.5–5.2)
PROT SERPL-MCNC: 6.2 G/DL (ref 6–8.5)
RBC # BLD AUTO: 4.66 10*6/MM3 (ref 4.14–5.8)
SODIUM SERPL-SCNC: 142 MMOL/L (ref 136–145)
TROPONIN T % DELTA: 10
TROPONIN T NUMERIC DELTA: 4 NG/L
TROPONIN T SERPL HS-MCNC: 40 NG/L
TSH SERPL DL<=0.05 MIU/L-ACNC: 2.72 UIU/ML (ref 0.27–4.2)
WBC NRBC COR # BLD AUTO: 6.45 10*3/MM3 (ref 3.4–10.8)
WHOLE BLOOD HOLD COAG: NORMAL
WHOLE BLOOD HOLD SPECIMEN: NORMAL

## 2025-01-16 PROCEDURE — 85025 COMPLETE CBC W/AUTO DIFF WBC: CPT | Performed by: EMERGENCY MEDICINE

## 2025-01-16 PROCEDURE — 25010000002 FUROSEMIDE PER 20 MG: Performed by: EMERGENCY MEDICINE

## 2025-01-16 PROCEDURE — 84484 ASSAY OF TROPONIN QUANT: CPT | Performed by: EMERGENCY MEDICINE

## 2025-01-16 PROCEDURE — 36415 COLL VENOUS BLD VENIPUNCTURE: CPT

## 2025-01-16 PROCEDURE — 99284 EMERGENCY DEPT VISIT MOD MDM: CPT | Performed by: EMERGENCY MEDICINE

## 2025-01-16 PROCEDURE — 93005 ELECTROCARDIOGRAM TRACING: CPT | Performed by: EMERGENCY MEDICINE

## 2025-01-16 PROCEDURE — 83735 ASSAY OF MAGNESIUM: CPT | Performed by: EMERGENCY MEDICINE

## 2025-01-16 PROCEDURE — 71045 X-RAY EXAM CHEST 1 VIEW: CPT

## 2025-01-16 PROCEDURE — 96374 THER/PROPH/DIAG INJ IV PUSH: CPT

## 2025-01-16 PROCEDURE — 80053 COMPREHEN METABOLIC PANEL: CPT | Performed by: EMERGENCY MEDICINE

## 2025-01-16 PROCEDURE — 84443 ASSAY THYROID STIM HORMONE: CPT | Performed by: EMERGENCY MEDICINE

## 2025-01-16 RX ORDER — POTASSIUM CHLORIDE 750 MG/1
20 TABLET, EXTENDED RELEASE ORAL DAILY
Qty: 42 TABLET | Refills: 0 | Status: SHIPPED | OUTPATIENT
Start: 2025-01-16 | End: 2025-02-06

## 2025-01-16 RX ORDER — FUROSEMIDE 20 MG/1
20 TABLET ORAL 2 TIMES DAILY
Qty: 28 TABLET | Refills: 0 | Status: SHIPPED | OUTPATIENT
Start: 2025-01-16 | End: 2025-01-23 | Stop reason: SDUPTHER

## 2025-01-16 RX ORDER — SODIUM CHLORIDE 0.9 % (FLUSH) 0.9 %
10 SYRINGE (ML) INJECTION AS NEEDED
Status: DISCONTINUED | OUTPATIENT
Start: 2025-01-16 | End: 2025-01-17 | Stop reason: HOSPADM

## 2025-01-16 RX ORDER — FUROSEMIDE 10 MG/ML
40 INJECTION INTRAMUSCULAR; INTRAVENOUS ONCE
Status: COMPLETED | OUTPATIENT
Start: 2025-01-16 | End: 2025-01-16

## 2025-01-16 RX ADMIN — FUROSEMIDE 40 MG: 10 INJECTION, SOLUTION INTRAMUSCULAR; INTRAVENOUS at 18:24

## 2025-01-16 NOTE — PLAN OF CARE
Goal Outcome Evaluation:              Outcome Summary: patient is alert x4, ambulated, marcano in place, luis carlos drain, change luis carlos dressing, roomair while awake/2L NC while sleeping, using IS, chewing gum, no c.o at this time, will cont to monitor   0

## 2025-01-16 NOTE — ED PROVIDER NOTES
[facility] EMERGENCY DEPARTMENT  Emergency Department Encounter  Emergency Medicine Physician Note     Pt Name:Blane Dietz  MRN: [id number]  Birthdate 1935  Date of evaluation: 1/16/2025  PCP:  David Em MD  Note Started: 5:02 PM EST      CHIEF COMPLAINT       Chief Complaint   Patient presents with    Rapid Heart Rate       HISTORY OF PRESENT ILLNESS  (Location/Symptom, Timing/Onset, Context/Setting, Quality, Duration, Modifying Factors, Severity.)      Blane Dietz is a 89 y.o. male who presents with rapid heart rate and lightheadedness.  Patient was noted to be getting in the shower when he felt palpitations and lightheaded.  Patient was noted to have elevated heart rate with rapid ventricular rate.  Received medication management by EMS with heart rate improvement.  Patient states that he feels much better at this time.  Patient states has been compliant with his Eliquis since discharge.  Patient states he has not been compliant with Lasix.  Patient denies any fevers chills nausea or vomiting.  Patient denies any chest pain or abdominal pain.    PAST MEDICAL / SURGICAL / SOCIAL / FAMILY HISTORY     Past Medical History:   Diagnosis Date    Aortic stenosis     status post ROSS procedure, remote, with December 2015 echocardiography revealing normal aortic and pulmonary valve function, normal ejection fraction and mild to moderate MR    Arthritis     Bilateral impacted cerumen 11/23/2016    Bronchitis     CHF (congestive heart failure)     Chronic gout of right foot 06/13/2016    Impression: 03/08/2016 - stable.;     COVID-19 vaccine series completed 05/12/2021    Maderna 2nd dose 3/12/21.    Depression     Diabetes mellitus     Diverticulitis     Exogenous obesity     Gout     Heart murmur     History of vitamin D deficiency     Hypercholesteremia 08/11/2016    Hyperlipidemia     Hypertension     Impaired mobility     Kidney lesion     Malignant neoplasm of prostate     Morbid  obesity 08/11/2016    Pneumonia 05/12/2021    Primary osteoarthritis involving multiple joints 06/13/2016    Impression: 03/08/2016 - stable;     Pulmonary embolism     Sleep apnea 05/12/2021    C-Pap    Uncontrolled type 2 diabetes mellitus with hyperglycemia 06/13/2016    Impression: 03/08/2016 - seeing Endo .;     Vertigo      No additional pertinent       Past Surgical History:   Procedure Laterality Date    CARDIAC VALVE REPLACEMENT  05/12/2021    Ross procedure 22 years ago    COLON SURGERY      COLONOSCOPY      COLOSTOMY  1999    COLOSTOMY REVISION      CYSTOSCOPY N/A 12/7/2021    Procedure: CYSTOSCOPY FLEXIBLE;  Surgeon: José Miguel Villafuerte Jr., MD;  Location:  [facility];  Service: Urology;  Laterality: N/A;    EXPLORATORY LAPAROTOMY      With Tissue Removal    LIPOMA EXCISION      MOHS SURGERY      NEPHROURETERECTOMY Right 12/7/2021    Procedure: RIGHT NEPHROURETERECTOMY LAPAROSCOPIC WITH DAVINCI ROBOT;  Surgeon: José Miguel Villafuerte Jr., MD;  Location:  [facility];  Service: Robotics - DaVinci;  Laterality: Right;    OTHER SURGICAL HISTORY      Porcine Valve    PROSTATE SURGERY      PROSTATECTOMY  2000     History of Prostatectomy Perineal Radical    SKIN CANCER EXCISION      from head and lip    TONSILLECTOMY       No additional pertinent       Social History     Socioeconomic History    Marital status: Single   Tobacco Use    Smoking status: Never     Passive exposure: Never    Smokeless tobacco: Never   Vaping Use    Vaping status: Never Used   Substance and Sexual Activity    Alcohol use: No    Drug use: No    Sexual activity: Not Currently       Family History   Problem Relation Age of Onset    Diabetes Mother     Lung cancer Mother     Cancer Mother     Heart disease Father     Breast cancer Other     Cancer Other     Hypertension Other     Migraines Other     Obesity Other     Diabetes Other        Allergies:  Ampicillin, Medrol [methylprednisolone], Myrbetriq [mirabegron], Penicillins, Bee venom,  "and Melatonin    Home Medications:  Prior to Admission medications    Medication Sig Start Date End Date Taking? Authorizing Provider   allopurinol (ZYLOPRIM) 100 MG tablet TAKE 1 TABLET BY MOUTH DAILY 10/7/24   David Em MD   amiodarone (PACERONE) 200 MG tablet Take 1 tablet by mouth Daily. 12/5/22   Ha Toussaint MD   apixaban (ELIQUIS) 5 MG tablet tablet Take 1 tablet by mouth Every 12 (Twelve) Hours for 30 days. Indications: Atrial Fibrillation 12/23/24 1/22/25  Deanne Bowles MD   Apoaequorin (PREVAGEN PO) Take  by mouth.    Dave Devi MD   ascorbic acid (VITAMIN C) 1000 MG tablet Take 1 tablet by mouth Daily.    Dave Devi MD   Calcium Carb-Cholecalciferol 600-5 MG-MCG tablet Take 1 tablet by mouth Every Other Day. Indications: Low Amount of Calcium in the Blood 7/17/24   Olimpia Real APRN   ciclopirox (LOPROX) 1 % shampoo  8/21/24   Dave Devi MD   CINNAMON PO Take  by mouth.    Dave Devi MD   Droplet Insulin Syringe 31G X 5/16\" 0.5 ML misc  4/24/24   Dave Devi MD   Dulaglutide (Trulicity) 3 MG/0.5ML solution pen-injector Inject 0.5 mL under the skin into the appropriate area as directed 1 (One) Time Per Week. 7/25/24   David Em MD   Entresto 24-26 MG tablet TAKE ONE TABLET BY MOUTH EVERY 12 HOURS 10/24/24   David Em MD   enzalutamide (XTANDI) 40 MG tablet tablet Take 3 tablets by mouth Daily. 9/25/24   Shannan Ramon MD   fluocinonide (LIDEX) 0.05 % external solution  8/19/24   Dave Devi MD   fluticasone (FLONASE) 50 MCG/ACT nasal spray 2 sprays into the nostril(s) as directed by provider Daily. 2 puffs each nostril  Patient taking differently: Administer 2 sprays into the nostril(s) as directed by provider Daily As Needed for Rhinitis or Allergies. 12/20/19   Rosanna Nicolas APRN   furosemide (LASIX) 20 MG tablet Take 1 tablet by mouth 2 (Two) Times a Day for 3 days. 12/23/24 12/26/24  Wilbur, " "MD Deanne   glucose blood (True Metrix Blood Glucose Test) test strip Use to test blood sugar 3 times daily.  Dx E11.65 4/30/24   Sophia Ferreira PA   Jardiance 25 MG tablet tablet TAKE 1 TABLET BY MOUTH DAILY 10/7/24   David Em MD   ketoconazole (NIZORAL) 2 % shampoo Apply 1 application  topically to the appropriate area as directed 2 (Two) Times a Week. Indications: Tinea Versicolor 5/26/23   Dave Devi MD   Lantus 100 UNIT/ML injection Inject up to 50 units daily subcutaneously  Patient taking differently: Inject up to 20 units daily subcutaneously 7/21/23   Xavier Boston MD   metoprolol succinate XL (TOPROL-XL) 25 MG 24 hr tablet TAKE 1/2 TABLET BY MOUTH DAILY 10/7/24   David Em MD   multivitamin with minerals tablet tablet Take 1 tablet by mouth Daily. AREDS 2  Indications: vitamin deficiency    Dave Devi MD   pravastatin (PRAVACHOL) 40 MG tablet TAKE ONE TABLET BY MOUTH EVERY NIGHT AT BEDTIME 10/7/24   David Em MD   triamcinolone (KENALOG) 0.1 % cream Apply 1 Application topically to the appropriate area as directed 2 (Two) Times a Day.    Dave Devi MD   VITAMIN D, CHOLECALCIFEROL, PO Take  by mouth.    Dave Devi MD         REVIEW OF SYSTEMS       Review of Systems   Constitutional:  Positive for fatigue. Negative for chills and fever.   Respiratory:  Positive for shortness of breath. Negative for chest tightness.    Cardiovascular:  Positive for palpitations. Negative for chest pain.   Gastrointestinal:  Negative for abdominal pain, diarrhea, nausea and vomiting.   Genitourinary:  Negative for flank pain.   Neurological:  Positive for dizziness, weakness and light-headedness.       PHYSICAL EXAM      INITIAL VITALS:   /94   Pulse 105   Temp 97.6 °F (36.4 °C) (Oral)   Resp 18   Ht 168.9 cm (66.5\")   Wt 87 kg (191 lb 12.8 oz)   SpO2 99%   BMI 30.49 kg/m²     Physical Exam  Constitutional:       Appearance: Normal " appearance.   HENT:      Head: Normocephalic and atraumatic.   Eyes:      Extraocular Movements: Extraocular movements intact.   Cardiovascular:      Rate and Rhythm: Normal rate and regular rhythm. Tachycardia present. Rhythm irregular.   Pulmonary:      Effort: Pulmonary effort is normal.      Breath sounds: Normal breath sounds.   Abdominal:      General: Abdomen is flat.      Palpations: Abdomen is soft.      Tenderness: There is no abdominal tenderness.   Musculoskeletal:      Right lower leg: Edema present.      Left lower leg: Edema present.   Skin:     General: Skin is warm and dry.      Findings: Erythema (Bilateral lower extremities from venous stasis) present.   Neurological:      General: No focal deficit present.      Mental Status: He is alert and oriented to person, place, and time.   Psychiatric:         Mood and Affect: Mood normal.         Behavior: Behavior normal.           DDX/DIAGNOSTIC RESULTS / EMERGENCY DEPARTMENT COURSE / MDM     Differential Diagnosis included but not limited: A-fib with RVR, ACS, CHF, acute exacerbation CHF, pneumonia, infectious processes leading to tachycardia    Diagnoses Considered but Do Not Suspect: CVA, severe fluid overload, severe infectio.Decision Rules/Scores utilized: N/A     Tests considered but not ordered and why:  N/A     MIPS: N/A     Code Status Discussion:  Not Discussed    Additional Patient Education Provided: None     Medical Decision Making    Medical Decision Making  Patient presenting with dizziness and atrial fibrillation rapid ventricular rate, exam demonstrates concerns for volume overload, given Lasix here in the emergency department.  EKG demonstrated no signs of active ischemia.  Troponin was obtained and noted to be negative, due to timing delta troponin was obtained and demonstrated 4, patient with no active chest pain, low concerns for acute coronary syndrome at this time.  Patient's presentation not consistent with pulmonary embolism.   X-ray demonstrated no acute cardiopulmonary processes, low concern for traumatic injury including pneumothorax.  Patient had no infectious symptoms, x-ray noted to be negative, low concern for pneumonia.  Patient had normal O2 saturation and stable heart rate with ambulation through the emergency department.  Patient had increased urinary output after Lasix.  Patient has not been taking his Lasix at home as he has been out, given prescription for Lasix.  Low concern for severe acute CHF exacerbation at this time.  Patient do feel patient is stable for discharge home for further follow-up by primary care doctor and cardiologist.  Patient given discharge instructions to return for worsening chest pain, lightheadedness, dizziness, feeling sweaty, shortness of breath, or any other concerns.  Patient discharged home in stable condition.     Problems Addressed:  Atrial fibrillation with rapid ventricular response: complicated acute illness or injury  Congestive heart failure, unspecified HF chronicity, unspecified heart failure type: complicated acute illness or injury    Amount and/or Complexity of Data Reviewed  External Data Reviewed: labs, radiology and notes.  Labs: ordered. Decision-making details documented in ED Course.  Radiology: ordered. Decision-making details documented in ED Course.     Details: Personally evaluated and interpreted the x-ray, mild pulmonary edema noted with slight effusion   ECG/medicine tests: ordered. Decision-making details documented in ED Course.    Risk  Prescription drug management.        See ED COURSE for additional MDM statements    EKG    EKG Interpretation    Evaluated and interpreted by emergency department physician    Rhythm: Atrial fibrillation  Rate: 104  Axis: Left  Ectopy: none  Conduction: Normal  ST Segments: Normal  T Waves: Nonspecific T wave versions in V3  Q Waves: None    Clinical Impression: Atrial fibrillation with rapid ventricular rate    Luis CASTILLO  DO Dianna      All EKG's are interpreted by the Emergency Department Physician who either signs or Co-signs this chart in the absence of a cardiologist.    Additional Scores                   EMERGENCY DEPARTMENT COURSE:    ED Course as of 01/17/25 1556   Thu Jan 16, 2025   2245 Troponin T Numeric Delta: 4 [CR]      ED Course User Index  [CR] Luis Bustamante DO       PROCEDURES:  None Performed   Procedures    DATA FOR LAB AND RADIOLOGY TESTS ORDERED BELOW ARE REVIEWED BY THE ED CLINICIAN:    RADIOLOGY: All x-rays, CT, MRI, and formal ultrasound images (except ED bedside ultrasound) are read by the radiologist, see reports below, unless otherwise noted in MDM or here.  Reports below are reviewed by myself.  XR Chest 1 View   Final Result   Interval increase in right pleural effusion which may be   loculated.               Images were reviewed, interpreted, and dictated by Dr. Di Ayon MD   Transcribed by Leatha Arvizu PA-C.       This report was signed and finalized on 1/17/2025 9:15 AM by Di Ayon MD.              LABS: Lab orders shown below, the results are reviewed by myself, and all abnormals are listed below.  Labs Reviewed   COMPREHENSIVE METABOLIC PANEL - Abnormal; Notable for the following components:       Result Value    Glucose 182 (*)     BUN 25 (*)     Creatinine 1.34 (*)     CO2 21.5 (*)     Total Bilirubin 2.3 (*)     eGFR 51.3 (*)     All other components within normal limits    Narrative:     GFR Categories in Chronic Kidney Disease (CKD)      GFR Category          GFR (mL/min/1.73)    Interpretation  G1                     90 or greater         Normal or high (1)  G2                      60-89                Mild decrease (1)  G3a                   45-59                Mild to moderate decrease  G3b                   30-44                Moderate to severe decrease  G4                    15-29                Severe decrease  G5                    14 or less            Kidney failure          (1)In the absence of evidence of kidney disease, neither GFR category G1 or G2 fulfill the criteria for CKD.    eGFR calculation 2021 CKD-EPI creatinine equation, which does not include race as a factor   TROPONIN - Abnormal; Notable for the following components:    HS Troponin T 40 (*)     All other components within normal limits    Narrative:     High Sensitive Troponin T Reference Range:  <14.0 ng/L- Negative Female for AMI  <22.0 ng/L- Negative Male for AMI  >=14 - Abnormal Female indicating possible myocardial injury.  >=22 - Abnormal Male indicating possible myocardial injury.   Clinicians would have to utilize clinical acumen, EKG, Troponin, and serial changes to determine if it is an Acute Myocardial Infarction or myocardial injury due to an underlying chronic condition.        CBC WITH AUTO DIFFERENTIAL - Abnormal; Notable for the following components:    RDW 16.3 (*)     RDW-SD 55.0 (*)     Platelets 109 (*)     Lymphocyte % 19.4 (*)     Immature Grans % 1.4 (*)     Immature Grans, Absolute 0.09 (*)     All other components within normal limits   HIGH SENSITIVITIY TROPONIN T 1HR - Abnormal; Notable for the following components:    HS Troponin T 44 (*)     All other components within normal limits    Narrative:     High Sensitive Troponin T Reference Range:  <14.0 ng/L- Negative Female for AMI  <22.0 ng/L- Negative Male for AMI  >=14 - Abnormal Female indicating possible myocardial injury.  >=22 - Abnormal Male indicating possible myocardial injury.   Clinicians would have to utilize clinical acumen, EKG, Troponin, and serial changes to determine if it is an Acute Myocardial Infarction or myocardial injury due to an underlying chronic condition.        MAGNESIUM - Normal   TSH RFX ON ABNORMAL TO FREE T4 - Normal   RAINBOW DRAW    Narrative:     The following orders were created for panel order [place] Draw.  Procedure                               Abnormality         Status                      ---------                               -----------         ------                     Green Top (Gel)[[id number]]                                  Final result               Lavender Top[[id number]]                                     Final result               Gold Top - SST[[id number]]                                   Final result               Light Blue Top[[id number]]                                   Final result                 Please view results for these tests on the individual orders.   CBC AND DIFFERENTIAL    Narrative:     The following orders were created for panel order CBC & Differential.  Procedure                               Abnormality         Status                     ---------                               -----------         ------                     CBC Auto Differential[[id number]]        Abnormal            Final result               Scan Slide[[id number]]                                                                    Please view results for these tests on the individual orders.   GREEN TOP   LAVENDER TOP   GOLD TOP - SST   LIGHT BLUE TOP       Vitals Reviewed:    Vitals:    01/16/25 2028 01/16/25 2058 01/16/25 2108 01/16/25 2200   BP: 142/92 134/89  131/94   Pulse: 104 102 104 105   Resp:    18   Temp:       TempSrc:       SpO2: 99% 98% 99% 99%   Weight:       Height:           MEDICATIONS GIVEN TO PATIENT THIS ENCOUNTER:  Medications   furosemide (LASIX) injection 40 mg (40 mg Intravenous Given 1/16/25 1824)       CONSULTS:  None    CRITICAL CARE:  There was significant risk of life threatening deterioration of patient's condition requiring my direct management. Critical care time 0 minutes, excluding any documented procedures.    FINAL IMPRESSION      1. Atrial fibrillation with rapid ventricular response    2. Congestive heart failure, unspecified HF chronicity, unspecified heart failure type          DISPOSITION / PLAN     ED Disposition       ED Disposition   Discharge  "   Condition   Stable    Comment   --               PATIENT REFERRED TO:  David Em MD  [address]  [facility] [address]  [phone]    Go on 1/23/2025      Ha Toussaint MD  [address] [facility] [address]  [facility] [id number] [phone]    Schedule an appointment as soon as possible for a visit in 1 week        DISCHARGE MEDICATIONS:     Medication List        START taking these medications      potassium chloride 10 MEQ CR tablet  Take 2 tablets by mouth Daily for 21 days.            CHANGE how you take these medications      fluticasone 50 MCG/ACT nasal spray  Commonly known as: Flonase  2 sprays into the nostril(s) as directed by provider Daily. 2 puffs each nostril  What changed:   when to take this  reasons to take this  additional instructions     furosemide 20 MG tablet  Commonly known as: LASIX  Take 1 tablet by mouth 2 (Two) Times a Day for 14 days. Take twice a day until swelling improves and then take once daily after that  What changed: additional instructions     Lantus 100 UNIT/ML injection  Generic drug: insulin glargine  Inject up to 50 units daily subcutaneously  What changed: additional instructions            CONTINUE taking these medications      allopurinol 100 MG tablet  Commonly known as: ZYLOPRIM  TAKE 1 TABLET BY MOUTH DAILY     amiodarone 200 MG tablet  Commonly known as: PACERONE     apixaban 5 MG tablet tablet  Commonly known as: ELIQUIS  Take 1 tablet by mouth Every 12 (Twelve) Hours for 30 days. Indications: Atrial Fibrillation     ascorbic acid 1000 MG tablet  Commonly known as: VITAMIN C     Calcium Carb-Cholecalciferol 600-5 MG-MCG tablet  Take 1 tablet by mouth Every Other Day. Indications: Low Amount of Calcium in the Blood     ciclopirox 1 % shampoo  Commonly known as: LOPROX     CINNAMON PO     Droplet Insulin Syringe 31G X 5/16\" 0.5 ML misc  Generic drug: Insulin Syringe-Needle U-100     Entresto 24-26 MG tablet  Generic drug: " sacubitril-valsartan  TAKE ONE TABLET BY MOUTH EVERY 12 HOURS     fluocinonide 0.05 % external solution  Commonly known as: LIDEX     Jardiance 25 MG tablet tablet  Generic drug: empagliflozin  TAKE 1 TABLET BY MOUTH DAILY     ketoconazole 2 % shampoo  Commonly known as: NIZORAL     metoprolol succinate XL 25 MG 24 hr tablet  Commonly known as: TOPROL-XL  TAKE 1/2 TABLET BY MOUTH DAILY     multivitamin with minerals tablet tablet     pravastatin 40 MG tablet  Commonly known as: PRAVACHOL  TAKE ONE TABLET BY MOUTH EVERY NIGHT AT BEDTIME     PREVAGEN PO     triamcinolone 0.1 % cream  Commonly known as: KENALOG     True Metrix Blood Glucose Test test strip  Generic drug: glucose blood  Use to test blood sugar 3 times daily.  Dx E11.65     Trulicity 3 MG/0.5ML solution pen-injector  Generic drug: Dulaglutide  Inject 0.5 mL under the skin into the appropriate area as directed 1 (One) Time Per Week.     VITAMIN D (CHOLECALCIFEROL) PO     Xtandi 40 MG tablet tablet  Generic drug: enzalutamide  Take 3 tablets by mouth Daily.               Where to Get Your Medications        These medications were sent to [facility] [id number] - [place], [place] - [address] [place] [address] AT [facility] - [phone]  - [phone] [address] [address]      Phone: [phone]   apixaban 5 MG tablet tablet  furosemide 20 MG tablet  potassium chloride 10 MEQ CR tablet         Electronically signed by Luis Bustamante DO, 01/16/25, 5:02 PM EST.    Emergency Medicine Physician  [facility]  (Please note that portions of thisnote were completed with a voice recognition program.  Efforts were made to edit the dictations but occasionally words are mis-transcribed.)       Luis Bustamante DO  01/17/25 1600

## 2025-01-17 NOTE — DISCHARGE INSTRUCTIONS
If you notice any concerning symptoms, please return to the ER immediately. These can include but are not limited to: worsening of you condition, fevers, chills, shortness of breath, vomiting, weakness of the extremities, changes in your mental status, numbness, pale extremities, or chest pain.     Take medications as prescribed, your pharmacist may have additional recommendations concerning these medications.    For pain use ibuprofen (Motrin) or acetaminophen (Tylenol), unless prescribed medications that also contain these medications.  You can take over the counter acetaminophen or ibuprofen, please follow the directions as dosages differ. Do not take ibuprofen if you have a history of peptic ulcers, kidney disease, bariatric surgery, or are currently pregnant.  Do not take Tylenol if you have a history of liver disease or alcohol use disorder.        THANK YOU!!! From [facility] Emergency Department    On behalf of the Emergency Department staff at [facility], I would like to thank you for giving us the opportunity to address your health care needs and concerns. We hope that during your visit, our service was delivered in a professional and caring manner. Please keep [facility] in mind as we walk with you down the path to your own personal wellness. Please expect follow-up phone calls concerning additional care and questions about your experience.      You have received additional information specific to your diagnosis in these discharge instructions, please read these fully.  Anytime you have been seen in the emergency department we recommend close follow up with your primary care provider or specialist, please follow these directions as indicated.

## 2025-01-22 ENCOUNTER — TELEPHONE (OUTPATIENT)
Dept: ONCOLOGY | Facility: CLINIC | Age: 89
End: 2025-01-22
Payer: MEDICARE

## 2025-01-22 NOTE — TELEPHONE ENCOUNTER
Caller: Susana Dietz    Relationship to patient: Emergency Contact    Best call back number:     [phone]      Chief complaint: PT IS STILL NOT FEELING QUITE WELL AND DOES NOT WANT TO GET OUT TODAY    Type of visit: FOLLOW UP AND INFUSION    Requested date: 01/29 WOULD LIKE IN THE AFTERNOON    If rescheduling, when is the original appointment: 01/22     Additional notes: TRIED TO WT.

## 2025-01-23 ENCOUNTER — OFFICE VISIT (OUTPATIENT)
Dept: INTERNAL MEDICINE | Facility: CLINIC | Age: 89
End: 2025-01-23
Payer: MEDICARE

## 2025-01-23 VITALS
HEART RATE: 100 BPM | BODY MASS INDEX: 31.02 KG/M2 | RESPIRATION RATE: 18 BRPM | SYSTOLIC BLOOD PRESSURE: 124 MMHG | DIASTOLIC BLOOD PRESSURE: 78 MMHG | OXYGEN SATURATION: 95 % | TEMPERATURE: 98 F | HEIGHT: 66 IN | WEIGHT: 193 LBS

## 2025-01-23 DIAGNOSIS — I48.0 PAROXYSMAL ATRIAL FIBRILLATION: ICD-10-CM

## 2025-01-23 DIAGNOSIS — E11.65 TYPE 2 DIABETES MELLITUS WITH HYPERGLYCEMIA, WITH LONG-TERM CURRENT USE OF INSULIN: ICD-10-CM

## 2025-01-23 DIAGNOSIS — I10 ESSENTIAL HYPERTENSION: Primary | ICD-10-CM

## 2025-01-23 DIAGNOSIS — N18.32 STAGE 3B CHRONIC KIDNEY DISEASE: ICD-10-CM

## 2025-01-23 DIAGNOSIS — Z79.4 TYPE 2 DIABETES MELLITUS WITH HYPERGLYCEMIA, WITH LONG-TERM CURRENT USE OF INSULIN: ICD-10-CM

## 2025-01-23 DIAGNOSIS — I50.22 CHRONIC HFREF (HEART FAILURE WITH REDUCED EJECTION FRACTION): ICD-10-CM

## 2025-01-23 RX ORDER — FUROSEMIDE 40 MG/1
40 TABLET ORAL DAILY
Qty: 90 TABLET | Refills: 3 | Status: SHIPPED | OUTPATIENT
Start: 2025-01-23

## 2025-01-23 RX ORDER — METOPROLOL SUCCINATE 25 MG/1
25 TABLET, EXTENDED RELEASE ORAL DAILY
Qty: 45 TABLET | Refills: 3 | Status: SHIPPED | OUTPATIENT
Start: 2025-01-23

## 2025-01-23 NOTE — PROGRESS NOTES
"Subjective  Blane Dietz is a 89 y.o. male    HPI recent ER visit for complaints of palpitations and shortness of breath.  He has a history of diabetes and chronic lower extremity edema history of CKD.  He was admitted to the hospital and placed on amiodarone.  He was discharged on Eliquis however patient says he cannot afford this and did not get his refill.  Currently without shortness of breath has lower extremity edema with chronic stasis dermatitis and skin discoloration no open wounds or oozing he denies orthopnea    The following portions of the patient's history were reviewed and updated as appropriate: allergies, current medications, past family history, past medical history, past social history, past surgical history, and problem list.     Review of Systems   Constitutional: Negative.  Positive for fatigue. Negative for activity change, appetite change and fever.   HENT:  Negative for congestion, ear discharge, ear pain and trouble swallowing.    Eyes:  Negative for photophobia and visual disturbance.   Respiratory:  Negative for cough and shortness of breath.    Cardiovascular:  Negative for chest pain and palpitations.   Gastrointestinal:  Negative for abdominal distention, abdominal pain, constipation, diarrhea, nausea and vomiting.   Endocrine: Negative.    Genitourinary:  Negative for dysuria, hematuria and urgency.   Musculoskeletal:  Positive for arthralgias and back pain. Negative for joint swelling and myalgias.   Skin:  Negative for color change and rash.   Allergic/Immunologic: Negative.    Neurological:  Negative for dizziness, weakness, light-headedness and headaches.   Hematological:  Negative for adenopathy. Does not bruise/bleed easily.   Psychiatric/Behavioral:  Positive for sleep disturbance. Negative for agitation, confusion and dysphoric mood. The patient is not nervous/anxious.        Visit Vitals  /78   Pulse 100   Temp 98 °F (36.7 °C)   Resp 18   Ht 167.6 cm (66\")   Wt 87.5 " kg (193 lb)   SpO2 95%   BMI 31.15 kg/m²       Objective  Physical Exam  Constitutional:       General: He is not in acute distress.     Appearance: He is well-developed.   HENT:      Nose: Nose normal.   Eyes:      General: No scleral icterus.     Conjunctiva/sclera: Conjunctivae normal.   Neck:      Thyroid: No thyromegaly.      Trachea: No tracheal deviation.   Cardiovascular:      Rate and Rhythm: Normal rate and regular rhythm.      Heart sounds: No murmur heard.     No friction rub.   Pulmonary:      Effort: No respiratory distress.      Breath sounds: No wheezing or rales.   Abdominal:      General: There is no distension.      Palpations: Abdomen is soft. There is no mass.      Tenderness: There is no abdominal tenderness. There is no guarding.   Musculoskeletal:         General: No deformity. Normal range of motion.      Right lower leg: Edema present.      Left lower leg: Edema present.   Lymphadenopathy:      Cervical: No cervical adenopathy.   Skin:     General: Skin is warm and dry.      Findings: Erythema and rash present.   Neurological:      Mental Status: He is alert and oriented to person, place, and time.      Cranial Nerves: No cranial nerve deficit.      Coordination: Coordination normal.      Deep Tendon Reflexes: Reflexes are normal and symmetric.   Psychiatric:         Behavior: Behavior normal.         Thought Content: Thought content normal.         Judgment: Judgment normal.       Diagnoses and all orders for this visit:    Essential hypertension stable with current meds and low-salt diet    Chronic HFrEF (heart failure with reduced ejection fraction) discussed sodium restriction will switch Lasix from 20 twice daily to 40 mg once a day since he needs a slightly higher dose in view of his CKD.  Discussed low-sodium diet    Type 2 diabetes mellitus with hyperglycemia, with long-term current use of insulin continue with the dietary restrictions follow A1c    Stage 3b chronic kidney disease  follow renal panel avoid NSAIDs    Paroxysmal atrial fibrillation stable currently in regular rate.  Samples of Eliquis 2.5 mg daily given.  Has follow-up with his cardiologist in a few weeks

## 2025-01-28 ENCOUNTER — SPECIALTY PHARMACY (OUTPATIENT)
Dept: ONCOLOGY | Facility: HOSPITAL | Age: 89
End: 2025-01-28
Payer: MEDICARE

## 2025-01-31 ENCOUNTER — TELEPHONE (OUTPATIENT)
Dept: ONCOLOGY | Facility: CLINIC | Age: 89
End: 2025-01-31

## 2025-01-31 NOTE — TELEPHONE ENCOUNTER
Caller: ERASMO    Relationship: [employer]    Best call back number: [phone]    Who are you requesting to speak with (clinical staff, provider,  specific staff member): CLINICAL    What was the call regarding: ERASMO VERIFYING IF THE OFFICE RECEIVED A SCRIPT REQUEST FOR A [employer] CONCENTR[employer] THAT WAS FAXED OVER YESTERDAY.  IF NOT, PLEASED CALL ERASMO SO SHE CAN RE-FAX

## 2025-02-03 ENCOUNTER — HOSPITAL ENCOUNTER (INPATIENT)
Facility: HOSPITAL | Age: 89
LOS: 3 days | Discharge: HOME HEALTH | End: 2025-02-06
Attending: EMERGENCY MEDICINE | Admitting: FAMILY MEDICINE
Payer: MEDICARE

## 2025-02-03 ENCOUNTER — APPOINTMENT (OUTPATIENT)
Dept: GENERAL RADIOLOGY | Facility: HOSPITAL | Age: 89
End: 2025-02-03
Payer: MEDICARE

## 2025-02-03 DIAGNOSIS — I48.91 ATRIAL FIBRILLATION WITH RAPID VENTRICULAR RESPONSE: Primary | ICD-10-CM

## 2025-02-03 DIAGNOSIS — J06.9 VIRAL UPPER RESPIRATORY TRACT INFECTION: ICD-10-CM

## 2025-02-03 DIAGNOSIS — I50.21 ACUTE HFREF (HEART FAILURE WITH REDUCED EJECTION FRACTION): ICD-10-CM

## 2025-02-03 DIAGNOSIS — E87.79 OTHER HYPERVOLEMIA: ICD-10-CM

## 2025-02-03 PROBLEM — I50.23 ACUTE ON CHRONIC HFREF (HEART FAILURE WITH REDUCED EJECTION FRACTION): Status: ACTIVE | Noted: 2024-12-21

## 2025-02-03 PROBLEM — I45.10 RBBB: Status: ACTIVE | Noted: 2025-02-03

## 2025-02-03 LAB
ALBUMIN SERPL-MCNC: 3.9 G/DL (ref 3.5–5.2)
ALBUMIN/GLOB SERPL: 1.4 G/DL
ALP SERPL-CCNC: 125 U/L (ref 39–117)
ALT SERPL W P-5'-P-CCNC: 15 U/L (ref 1–41)
ANION GAP SERPL CALCULATED.3IONS-SCNC: 15.1 MMOL/L (ref 5–15)
AST SERPL-CCNC: 32 U/L (ref 1–40)
BASOPHILS # BLD AUTO: 0.08 10*3/MM3 (ref 0–0.2)
BASOPHILS NFR BLD AUTO: 1 % (ref 0–1.5)
BILIRUB SERPL-MCNC: 1.8 MG/DL (ref 0–1.2)
BUN SERPL-MCNC: 30 MG/DL (ref 8–23)
BUN/CREAT SERPL: 19.4 (ref 7–25)
CALCIUM SPEC-SCNC: 7.9 MG/DL (ref 8.6–10.5)
CHLORIDE SERPL-SCNC: 104 MMOL/L (ref 98–107)
CO2 SERPL-SCNC: 18.9 MMOL/L (ref 22–29)
CREAT SERPL-MCNC: 1.55 MG/DL (ref 0.76–1.27)
DEPRECATED RDW RBC AUTO: 56.2 FL (ref 37–54)
EGFRCR SERPLBLD CKD-EPI 2021: 43.1 ML/MIN/1.73
EOSINOPHIL # BLD AUTO: 0.16 10*3/MM3 (ref 0–0.4)
EOSINOPHIL NFR BLD AUTO: 2 % (ref 0.3–6.2)
ERYTHROCYTE [DISTWIDTH] IN BLOOD BY AUTOMATED COUNT: 16 % (ref 12.3–15.4)
FLUAV RNA RESP QL NAA+PROBE: NOT DETECTED
FLUBV RNA RESP QL NAA+PROBE: NOT DETECTED
GEN 5 1HR TROPONIN T REFLEX: 47 NG/L
GLOBULIN UR ELPH-MCNC: 2.8 GM/DL
GLUCOSE BLDC GLUCOMTR-MCNC: 112 MG/DL (ref 70–130)
GLUCOSE BLDC GLUCOMTR-MCNC: 151 MG/DL (ref 70–130)
GLUCOSE BLDC GLUCOMTR-MCNC: 151 MG/DL (ref 70–130)
GLUCOSE BLDC GLUCOMTR-MCNC: 172 MG/DL (ref 70–130)
GLUCOSE SERPL-MCNC: 218 MG/DL (ref 65–99)
HCT VFR BLD AUTO: 45.4 % (ref 37.5–51)
HGB BLD-MCNC: 14.3 G/DL (ref 13–17.7)
HOLD SPECIMEN: NORMAL
HOLD SPECIMEN: NORMAL
IMM GRANULOCYTES # BLD AUTO: 0.08 10*3/MM3 (ref 0–0.05)
IMM GRANULOCYTES NFR BLD AUTO: 1 % (ref 0–0.5)
LYMPHOCYTES # BLD AUTO: 0.97 10*3/MM3 (ref 0.7–3.1)
LYMPHOCYTES NFR BLD AUTO: 12.2 % (ref 19.6–45.3)
MCH RBC QN AUTO: 30.5 PG (ref 26.6–33)
MCHC RBC AUTO-ENTMCNC: 31.5 G/DL (ref 31.5–35.7)
MCV RBC AUTO: 96.8 FL (ref 79–97)
MONOCYTES # BLD AUTO: 0.71 10*3/MM3 (ref 0.1–0.9)
MONOCYTES NFR BLD AUTO: 9 % (ref 5–12)
NEUTROPHILS NFR BLD AUTO: 5.93 10*3/MM3 (ref 1.7–7)
NEUTROPHILS NFR BLD AUTO: 74.8 % (ref 42.7–76)
NRBC BLD AUTO-RTO: 0 /100 WBC (ref 0–0.2)
NT-PROBNP SERPL-MCNC: ABNORMAL PG/ML (ref 0–1800)
PLATELET # BLD AUTO: 108 10*3/MM3 (ref 140–450)
PMV BLD AUTO: 11.3 FL (ref 6–12)
POTASSIUM SERPL-SCNC: 5.4 MMOL/L (ref 3.5–5.2)
PROT SERPL-MCNC: 6.7 G/DL (ref 6–8.5)
RBC # BLD AUTO: 4.69 10*6/MM3 (ref 4.14–5.8)
SARS-COV-2 RNA RESP QL NAA+PROBE: NOT DETECTED
SODIUM SERPL-SCNC: 138 MMOL/L (ref 136–145)
TROPONIN T % DELTA: 0
TROPONIN T NUMERIC DELTA: 0 NG/L
TROPONIN T SERPL HS-MCNC: 47 NG/L
WBC NRBC COR # BLD AUTO: 7.93 10*3/MM3 (ref 3.4–10.8)
WHOLE BLOOD HOLD COAG: NORMAL
WHOLE BLOOD HOLD SPECIMEN: NORMAL

## 2025-02-03 PROCEDURE — 71045 X-RAY EXAM CHEST 1 VIEW: CPT

## 2025-02-03 PROCEDURE — 84484 ASSAY OF TROPONIN QUANT: CPT | Performed by: EMERGENCY MEDICINE

## 2025-02-03 PROCEDURE — 87636 SARSCOV2 & INF A&B AMP PRB: CPT | Performed by: EMERGENCY MEDICINE

## 2025-02-03 PROCEDURE — 99285 EMERGENCY DEPT VISIT HI MDM: CPT | Performed by: EMERGENCY MEDICINE

## 2025-02-03 PROCEDURE — 99497 ADVNCD CARE PLAN 30 MIN: CPT | Performed by: PHYSICIAN ASSISTANT

## 2025-02-03 PROCEDURE — 63710000001 INSULIN LISPRO (HUMAN) PER 5 UNITS: Performed by: INTERNAL MEDICINE

## 2025-02-03 PROCEDURE — 99222 1ST HOSP IP/OBS MODERATE 55: CPT | Performed by: INTERNAL MEDICINE

## 2025-02-03 PROCEDURE — 82948 REAGENT STRIP/BLOOD GLUCOSE: CPT | Performed by: INTERNAL MEDICINE

## 2025-02-03 PROCEDURE — 93005 ELECTROCARDIOGRAM TRACING: CPT | Performed by: FAMILY MEDICINE

## 2025-02-03 PROCEDURE — 82948 REAGENT STRIP/BLOOD GLUCOSE: CPT

## 2025-02-03 PROCEDURE — 85025 COMPLETE CBC W/AUTO DIFF WBC: CPT | Performed by: EMERGENCY MEDICINE

## 2025-02-03 PROCEDURE — 93005 ELECTROCARDIOGRAM TRACING: CPT | Performed by: EMERGENCY MEDICINE

## 2025-02-03 PROCEDURE — 99222 1ST HOSP IP/OBS MODERATE 55: CPT | Performed by: PHYSICIAN ASSISTANT

## 2025-02-03 PROCEDURE — 83880 ASSAY OF NATRIURETIC PEPTIDE: CPT | Performed by: EMERGENCY MEDICINE

## 2025-02-03 PROCEDURE — 63710000001 INSULIN GLARGINE PER 5 UNITS: Performed by: INTERNAL MEDICINE

## 2025-02-03 PROCEDURE — 94660 CPAP INITIATION&MGMT: CPT

## 2025-02-03 PROCEDURE — 80053 COMPREHEN METABOLIC PANEL: CPT | Performed by: EMERGENCY MEDICINE

## 2025-02-03 PROCEDURE — 94799 UNLISTED PULMONARY SVC/PX: CPT

## 2025-02-03 PROCEDURE — 99223 1ST HOSP IP/OBS HIGH 75: CPT | Performed by: INTERNAL MEDICINE

## 2025-02-03 RX ORDER — INSULIN LISPRO 100 [IU]/ML
1-200 INJECTION, SOLUTION INTRAVENOUS; SUBCUTANEOUS
Status: DISCONTINUED | OUTPATIENT
Start: 2025-02-03 | End: 2025-02-06 | Stop reason: HOSPADM

## 2025-02-03 RX ORDER — DEXTROSE MONOHYDRATE 25 G/50ML
10-50 INJECTION, SOLUTION INTRAVENOUS
Status: DISCONTINUED | OUTPATIENT
Start: 2025-02-03 | End: 2025-02-06 | Stop reason: HOSPADM

## 2025-02-03 RX ORDER — SODIUM CHLORIDE 0.9 % (FLUSH) 0.9 %
10 SYRINGE (ML) INJECTION AS NEEDED
Status: DISCONTINUED | OUTPATIENT
Start: 2025-02-03 | End: 2025-02-06 | Stop reason: HOSPADM

## 2025-02-03 RX ORDER — METOPROLOL TARTRATE 1 MG/ML
5 INJECTION, SOLUTION INTRAVENOUS ONCE
Status: COMPLETED | OUTPATIENT
Start: 2025-02-03 | End: 2025-02-03

## 2025-02-03 RX ORDER — INSULIN LISPRO 100 [IU]/ML
1-200 INJECTION, SOLUTION INTRAVENOUS; SUBCUTANEOUS AS NEEDED
Status: DISCONTINUED | OUTPATIENT
Start: 2025-02-03 | End: 2025-02-06 | Stop reason: HOSPADM

## 2025-02-03 RX ORDER — NICOTINE POLACRILEX 4 MG
15 LOZENGE BUCCAL
Status: DISCONTINUED | OUTPATIENT
Start: 2025-02-03 | End: 2025-02-06 | Stop reason: HOSPADM

## 2025-02-03 RX ORDER — FUROSEMIDE 40 MG/1
40 TABLET ORAL 2 TIMES DAILY
Status: DISCONTINUED | OUTPATIENT
Start: 2025-02-03 | End: 2025-02-06 | Stop reason: HOSPADM

## 2025-02-03 RX ORDER — SODIUM CHLORIDE 9 MG/ML
40 INJECTION, SOLUTION INTRAVENOUS AS NEEDED
Status: DISCONTINUED | OUTPATIENT
Start: 2025-02-03 | End: 2025-02-06 | Stop reason: HOSPADM

## 2025-02-03 RX ORDER — ASCORBIC ACID 500 MG
1000 TABLET ORAL DAILY
Status: DISCONTINUED | OUTPATIENT
Start: 2025-02-03 | End: 2025-02-06 | Stop reason: HOSPADM

## 2025-02-03 RX ORDER — NITROGLYCERIN 0.4 MG/1
0.4 TABLET SUBLINGUAL
Status: DISCONTINUED | OUTPATIENT
Start: 2025-02-03 | End: 2025-02-06 | Stop reason: HOSPADM

## 2025-02-03 RX ORDER — SODIUM CHLORIDE 0.9 % (FLUSH) 0.9 %
10 SYRINGE (ML) INJECTION EVERY 12 HOURS SCHEDULED
Status: DISCONTINUED | OUTPATIENT
Start: 2025-02-03 | End: 2025-02-06 | Stop reason: HOSPADM

## 2025-02-03 RX ORDER — CALCIUM CARBONATE 500 MG/1
2 TABLET, CHEWABLE ORAL 2 TIMES DAILY PRN
Status: DISCONTINUED | OUTPATIENT
Start: 2025-02-03 | End: 2025-02-06 | Stop reason: HOSPADM

## 2025-02-03 RX ORDER — ONDANSETRON 2 MG/ML
4 INJECTION INTRAMUSCULAR; INTRAVENOUS EVERY 6 HOURS PRN
Status: DISCONTINUED | OUTPATIENT
Start: 2025-02-03 | End: 2025-02-06 | Stop reason: HOSPADM

## 2025-02-03 RX ORDER — BISACODYL 5 MG/1
5 TABLET, DELAYED RELEASE ORAL DAILY PRN
Status: DISCONTINUED | OUTPATIENT
Start: 2025-02-03 | End: 2025-02-06 | Stop reason: HOSPADM

## 2025-02-03 RX ORDER — AMOXICILLIN 250 MG
2 CAPSULE ORAL 2 TIMES DAILY PRN
Status: DISCONTINUED | OUTPATIENT
Start: 2025-02-03 | End: 2025-02-06 | Stop reason: HOSPADM

## 2025-02-03 RX ORDER — PRAVASTATIN SODIUM 20 MG
40 TABLET ORAL DAILY
Status: DISCONTINUED | OUTPATIENT
Start: 2025-02-03 | End: 2025-02-06 | Stop reason: HOSPADM

## 2025-02-03 RX ORDER — METOPROLOL SUCCINATE 25 MG/1
25 TABLET, EXTENDED RELEASE ORAL DAILY
Status: DISCONTINUED | OUTPATIENT
Start: 2025-02-03 | End: 2025-02-06 | Stop reason: HOSPADM

## 2025-02-03 RX ORDER — BISACODYL 10 MG
10 SUPPOSITORY, RECTAL RECTAL DAILY PRN
Status: DISCONTINUED | OUTPATIENT
Start: 2025-02-03 | End: 2025-02-06 | Stop reason: HOSPADM

## 2025-02-03 RX ORDER — ONDANSETRON 4 MG/1
4 TABLET, ORALLY DISINTEGRATING ORAL EVERY 6 HOURS PRN
Status: DISCONTINUED | OUTPATIENT
Start: 2025-02-03 | End: 2025-02-06 | Stop reason: HOSPADM

## 2025-02-03 RX ORDER — IBUPROFEN 600 MG/1
1 TABLET ORAL
Status: DISCONTINUED | OUTPATIENT
Start: 2025-02-03 | End: 2025-02-06 | Stop reason: HOSPADM

## 2025-02-03 RX ORDER — ALLOPURINOL 100 MG/1
100 TABLET ORAL DAILY
Status: DISCONTINUED | OUTPATIENT
Start: 2025-02-03 | End: 2025-02-06 | Stop reason: HOSPADM

## 2025-02-03 RX ORDER — POLYETHYLENE GLYCOL 3350 17 G/17G
17 POWDER, FOR SOLUTION ORAL DAILY PRN
Status: DISCONTINUED | OUTPATIENT
Start: 2025-02-03 | End: 2025-02-06 | Stop reason: HOSPADM

## 2025-02-03 RX ORDER — AMIODARONE HYDROCHLORIDE 200 MG/1
200 TABLET ORAL DAILY
Status: DISCONTINUED | OUTPATIENT
Start: 2025-02-03 | End: 2025-02-06 | Stop reason: HOSPADM

## 2025-02-03 RX ORDER — ACETAMINOPHEN 325 MG/1
650 TABLET ORAL EVERY 4 HOURS PRN
Status: DISCONTINUED | OUTPATIENT
Start: 2025-02-03 | End: 2025-02-06 | Stop reason: HOSPADM

## 2025-02-03 RX ORDER — METOPROLOL TARTRATE 1 MG/ML
2.5 INJECTION, SOLUTION INTRAVENOUS
Status: DISCONTINUED | OUTPATIENT
Start: 2025-02-03 | End: 2025-02-06 | Stop reason: HOSPADM

## 2025-02-03 RX ADMIN — METOPROLOL SUCCINATE 25 MG: 25 TABLET, EXTENDED RELEASE ORAL at 09:44

## 2025-02-03 RX ADMIN — FUROSEMIDE 40 MG: 40 TABLET ORAL at 09:43

## 2025-02-03 RX ADMIN — INSULIN GLARGINE 15 UNITS: 100 INJECTION, SOLUTION SUBCUTANEOUS at 20:59

## 2025-02-03 RX ADMIN — PRAVASTATIN SODIUM 40 MG: 20 TABLET ORAL at 09:45

## 2025-02-03 RX ADMIN — ALLOPURINOL 100 MG: 100 TABLET ORAL at 09:44

## 2025-02-03 RX ADMIN — METOPROLOL TARTRATE 5 MG: 1 INJECTION, SOLUTION INTRAVENOUS at 04:34

## 2025-02-03 RX ADMIN — APIXABAN 2.5 MG: 2.5 TABLET, FILM COATED ORAL at 20:58

## 2025-02-03 RX ADMIN — APIXABAN 2.5 MG: 2.5 TABLET, FILM COATED ORAL at 09:45

## 2025-02-03 RX ADMIN — AMIODARONE HYDROCHLORIDE 200 MG: 200 TABLET ORAL at 09:45

## 2025-02-03 RX ADMIN — OXYCODONE HYDROCHLORIDE AND ACETAMINOPHEN 1000 MG: 500 TABLET ORAL at 09:44

## 2025-02-03 RX ADMIN — FUROSEMIDE 40 MG: 40 TABLET ORAL at 20:58

## 2025-02-03 RX ADMIN — INSULIN LISPRO 4 UNITS: 100 INJECTION, SOLUTION INTRAVENOUS; SUBCUTANEOUS at 09:14

## 2025-02-03 RX ADMIN — EMPAGLIFLOZIN 25 MG: 25 TABLET, FILM COATED ORAL at 09:45

## 2025-02-03 RX ADMIN — Medication 10 ML: at 21:00

## 2025-02-03 NOTE — CASE MANAGEMENT/SOCIAL WORK
Discharge Planning Assessment  [facility]     Patient Name: Blane Dietz  MRN: [id number]  Today's Date: 2/3/2025    Admit Date: 2/3/2025    Plan: Patient plans to return to [facility]; denies needs at this time   Discharge Needs Assessment       Row Name 02/03/25 1149       Living Environment    People in Home facility resident    Unique Family Situation Resident at [facility]    Current Living Arrangements assisted living facility    Duration at Residence 3 years    Potentially Unsafe Housing Conditions none    In the past 12 months has the electric, gas, oil, or water company threatened to shut off services in your home? No    Primary Care Provided by self    Provides Primary Care For no one, unable/limited ability to care for self    Family Caregiver if Needed none    Quality of Family Relationships helpful;involved;supportive    Able to Return to Prior Arrangements yes       Resource/Environmental Concerns    Resource/Environmental Concerns none    Transportation Concerns none       Transportation Needs    In the past 12 months, has lack of transportation kept you from medical appointments or from getting medications? no    In the past 12 months, has lack of transportation kept you from meetings, work, or from getting things needed for daily living? No       Food Insecurity    Within the past 12 months, you worried that your food would run out before you got the money to buy more. Never true    Within the past 12 months, the food you bought just didn't last and you didn't have money to get more. Never true       Transition Planning    Patient/Family Anticipates Transition to home    Patient/Family Anticipated Services at Transition none    Transportation Anticipated family or friend will provide       Discharge Needs Assessment    Readmission Within the Last 30 Days no previous admission in last 30 days    Current Outpatient/Agency/Support Group assisted living facility    Equipment Currently Used at  Home none    Concerns to be Addressed discharge planning    Do you want help finding or keeping work or a job? I do not need or want help    Do you want help with school or training? For example, starting or completing job training or getting a high school diploma, GED or equivalent No    Anticipated Changes Related to Illness inability to care for self    Equipment Needed After Discharge none    Provided Post Acute Provider List? N/A    Provided Post Acute Provider Quality & Resource List? N/A    Patient's Choice of Community Agency(s) Resident at [facility]                   Discharge Plan       Row Name 02/03/25 1150       Plan    Plan Patient plans to return to [facility]; denies needs at this time    Patient/Family in Agreement with Plan yes    Provided Post Acute Provider List? N/A    Provided Post Acute Provider Quality & Resource List? N/A    Plan Comments Patient is awake and able to answer questions.  He is a current patient of Dr. Em and gets his medications from [facility].  He opts out of Meds to Bed.  He uses a oxygen and bi-pap ([employer]), and a walker at home.  He denies the need for additional DME or services at [place].  At the time of DC the patient plans to return to [facility].  Questions and concerns were addressed, IMM delivered at the time of this conversation. Will provide additional resources and information upon request.    Final Note Plans to return to [facility]                  Continued Care and Services - Admitted Since 2/3/2025    No active coordination exists for this encounter.       Selected Continued Care - Episodes Includes continued care and service providers with selected services from the active episodes listed below      Oncology Episode start date: 9/11/2023   There are no active outsourced providers for this episode.                    Demographic Summary       Row Name 02/03/25 1147       General Information    Admission Type inpatient    Arrived From emergency  department    Required Notices Provided Important Message from Medicare    Referral Source admission list    Reason for Consult discharge planning    Preferred Language English       Contact Information    Permission Granted to Share Info With                    Functional Status       Row Name 02/03/25 1147       Functional Status    Usual Activity Tolerance moderate    Current Activity Tolerance fair       Physical Activity    On average, how many days per week do you engage in moderate to strenuous exercise (like a brisk walk)? 0 days    On average, how many minutes do you engage in exercise at this level? 0 min    Number of minutes of exercise per week 0       Assessment of Health Literacy    How often do you have someone help you read hospital materials? Never    How often do you have problems learning about your medical condition because of difficulty understanding written information? Never    How often do you have a problem understanding what is told to you about your medical condition? Never    How confident are you filling out medical forms by yourself? Extremely    Health Literacy Excellent       Functional Status, IADL    Medications independent    Meal Preparation assistive person    Housekeeping assistive person    Laundry assistive person    Shopping assistive person    If for any reason you need help with day-to-day activities such as bathing, preparing meals, shopping, managing finances, etc., do you get the help you need? I get all the help I need       Mental Status    General Appearance WDL WDL       Mental Status Summary    Recent Changes in Mental Status/Cognitive Functioning no changes                   Psychosocial       Row Name 02/03/25 1149       Values/Beliefs    Spiritual, Cultural Beliefs, Rastafari Practices, Values that Affect Care no       Behavior WDL    Behavior WDL WDL       Emotion Mood WDL    Emotion/Mood/Affect WDL WDL       Speech WDL    Speech WDL WDL        Perceptual State WDL    Perceptual State WDL WDL       Thought Process WDL    Thought Process WDL WDL       Intellectual Performance WDL    Intellectual Performance WDL WDL                   Abuse/Neglect       Row Name 02/03/25 1149       Personal Safety    Feels Unsafe at Home or Work/School no    Feels Threatened by Someone no    Does Anyone Try to Keep You From Having Contact with Others or Doing Things Outside Your Home? no    Physical Signs of Abuse Present no                   Legal       Row Name 02/03/25 1149       Financial Resource Strain    How hard is it for you to pay for the very basics like food, housing, medical care, and heating? Not hard                   Substance Abuse       Row Name 02/03/25 1149       Substance Use    Substance Use Status never used                   Patient Forms       Row Name 02/03/25 1152       Patient Forms    Important Message from Medicare (IMM) Delivered    Delivered to Patient    Method of delivery In person                      Mackenzie Mccallum RN

## 2025-02-03 NOTE — CONSULTS
[facility]    INPATIENT PALLIATIVE CARE CONSULT      Name:  Blane Dietz   Age:  89 y.o.  Sex:  male  :  1935  MRN:  [id number]   Visit Number:  [id number]  Date of Service:  25  Date of Admission:  2/3/2025  Primary Care Physician:  David Em MD    Consulting Physician:  Dr. Guzman    Reason For Consult:  Introduction to Palliative services, Goals of care discussions , and Support during serious illness    History of Present Illness:  Blane Dietz is a 89 y.o. male with past medical history of B cell lymphoma s/p nephrectomy, prostate cancer s/p prostatectomy, HFrEF (EF36-40%), valvular heart disease s/p porcine valve, HTN, HLD, T2DM, sleep apnea, history of PE.  Patient presented to [facility] on 2/3/2025 secondary to dyspnea.  Upon ED evaluation, patient tachycardic with rate in the 160's.  EKG reported irregular and erratic variable morphology wide complex tachycardia, with history of RBBB.  Laboratory evaluation noted troponin 47, proBNP 11,175, K 5.4, creatinine 1.55, total bilirubin 1.8, WBC 7.93, covid/flu negative, cxr noting bilateral effusions right great than left, stable from prior.  He was given lopressor, with subsequent improvement to rate.  Unfortunately, due to low BP, unable to titrate diuretics.   Patient admitted to the primary medicine service for continued evaluation and management.  Cardiology consulted who recommended additional diuresis with lasix, continue metoprolol, and consider restarting entresto if BP able to tolerate.  Suspecting atrial flutter with RVR likely secondary to noncompliance with medications.  Further recommending consider referral for ablation as outpatient.  Palliative care consulted to further review GOC in the setting of complex medical decision making.       Palliative team met with patient at bedside.  He is alert/oriented and interactive with PC team.  He reports that prior to this acute hospitalization, patient  living at [facility].  He utilizes a walker to assist with mobility, denies recent falls.  He is able to complete most ADLs without significant need for assistance.  He is able to drive, however does not do this often.  He has assistance from his daughter and close friend for travel needs.  He denies changes in appetite, approximate 10 lb weight loss over the past 6 months.  He continues to be active in his community events and events within [facility], however does note that he has been more fatigued/tired, thinking this is secondary to his treatments he gets monthly on Wednesdays.  Per chart review, appears he did cancel his December appointment with ongology.  Patient reports symptoms of dyspnea, lower extremity edema, and fatigue, denies pain.      Patient agreeable to continued palliative care follow up and discussions regarding goals of care and advance care planning.     Review of Systems   Constitutional:  Positive for activity change and fatigue.   Respiratory:  Positive for shortness of breath.    Cardiovascular:  Positive for leg swelling.   Neurological:  Positive for weakness.        Medical History:  Past Medical History:   Diagnosis Date    Aortic stenosis     status post ROSS procedure, remote, with December 2015 echocardiography revealing normal aortic and pulmonary valve function, normal ejection fraction and mild to moderate MR    Arthritis     Bilateral impacted cerumen 11/23/2016    Bronchitis     CHF (congestive heart failure)     Chronic gout of right foot 06/13/2016    Impression: 03/08/2016 - stable.;     COVID-19 vaccine series completed 05/12/2021    Maderna 2nd dose 3/12/21.    Depression     Diabetes mellitus     Diverticulitis     Exogenous obesity     Gout     Heart murmur     History of vitamin D deficiency     Hypercholesteremia 08/11/2016    Hyperlipidemia     Hypertension     Impaired mobility     Kidney lesion     Malignant neoplasm of prostate     Morbid obesity 08/11/2016     Pneumonia 05/12/2021    Primary osteoarthritis involving multiple joints 06/13/2016    Impression: 03/08/2016 - stable;     Pulmonary embolism     Sleep apnea 05/12/2021    C-Pap    Uncontrolled type 2 diabetes mellitus with hyperglycemia 06/13/2016    Impression: 03/08/2016 - seeing Endo .;     Vertigo       Past Surgical History:   Procedure Laterality Date    CARDIAC VALVE REPLACEMENT  05/12/2021    Ross procedure 22 years ago    COLON SURGERY      COLONOSCOPY      COLOSTOMY  1999    COLOSTOMY REVISION      CYSTOSCOPY N/A 12/7/2021    Procedure: CYSTOSCOPY FLEXIBLE;  Surgeon: José Miguel Villafuerte Jr., MD;  Location:  [facility];  Service: Urology;  Laterality: N/A;    EXPLORATORY LAPAROTOMY      With Tissue Removal    LIPOMA EXCISION      MOHS SURGERY      NEPHROURETERECTOMY Right 12/7/2021    Procedure: RIGHT NEPHROURETERECTOMY LAPAROSCOPIC WITH DAVINCI ROBOT;  Surgeon: José Miguel Villafuerte Jr., MD;  Location:  [facility];  Service: Robotics - DaVinci;  Laterality: Right;    OTHER SURGICAL HISTORY      Porcine Valve    PROSTATE SURGERY      PROSTATECTOMY  2000     History of Prostatectomy Perineal Radical    SKIN CANCER EXCISION      from head and lip    TONSILLECTOMY       Family History   Problem Relation Age of Onset    Diabetes Mother     Lung cancer Mother     Cancer Mother     Heart disease Father     Breast cancer Other     Cancer Other     Hypertension Other     Migraines Other     Obesity Other     Diabetes Other      Allergies: Ampicillin, Medrol [methylprednisolone], Myrbetriq [mirabegron], Penicillins, Bee venom, and Melatonin    Hospital Scheduled Medications:    Current Facility-Administered Medications:     acetaminophen (TYLENOL) tablet 650 mg, 650 mg, Oral, Q4H PRN, Emery Guzman DO    allopurinol (ZYLOPRIM) tablet 100 mg, 100 mg, Oral, Daily, Emrey Guzman DO, 100 mg at 02/03/25 0944    amiodarone (PACERONE) tablet 200 mg, 200 mg, Oral, Daily, Emery Guzman DO, 200 mg at  02/03/25 0945    apixaban (ELIQUIS) tablet 2.5 mg, 2.5 mg, Oral, Q12H, Emery Guzman, DO, 2.5 mg at 02/03/25 0945    ascorbic acid (VITAMIN C) tablet 1,000 mg, 1,000 mg, Oral, Daily, Emery Guzman, DO, 1,000 mg at 02/03/25 0944    sennosides-docusate (PERICOLACE) 8.6-50 MG per tablet 2 tablet, 2 tablet, Oral, BID PRN **AND** polyethylene glycol (MIRALAX) packet 17 g, 17 g, Oral, Daily PRN **AND** bisacodyl (DULCOLAX) EC tablet 5 mg, 5 mg, Oral, Daily PRN **AND** bisacodyl (DULCOLAX) suppository 10 mg, 10 mg, Rectal, Daily PRN, Emery Guzman, DO    calcium carbonate (TUMS) chewable tablet 500 mg (200 mg elemental), 2 tablet, Oral, BID PRN, Emery Guzman, DO    Calcium Replacement - Follow Nurse / BPA Driven Protocol, , Not Applicable, PRN, Emery Guzman, DO    dextrose (D50W) (25 g/50 mL) IV injection 10-50 mL, 10-50 mL, Intravenous, Q15 Min PRN, Emery Guzman, DO    dextrose (GLUTOSE) oral gel 15 g, 15 g, Oral, Q15 Min PRN, Emery Guzman, DO    empagliflozin (JARDIANCE) tablet 25 mg, 25 mg, Oral, Daily, Emery Guzman, DO, 25 mg at 02/03/25 0945    furosemide (LASIX) tablet 40 mg, 40 mg, Oral, BID, Emery Guzman, DO, 40 mg at 02/03/25 0943    Glucagon (GLUCAGEN) injection 1 mg, 1 mg, Intramuscular, Q15 Min PRN, Emery Guzman,     insulin glargine (LANTUS, SEMGLEE) injection 1-200 Units, 1-200 Units, Subcutaneous, Nightly - Glucommander, Emery Guzman,     insulin lispro (humaLOG) injection 1-200 Units, 1-200 Units, Subcutaneous, 4x Daily With Meals & Nightly, Emery Guzman DO, 4 Units at 02/03/25 0914    insulin lispro (humaLOG) injection 1-200 Units, 1-200 Units, Subcutaneous, PRN, Emery Guzman,     Magnesium Standard Dose Replacement - Follow Nurse / BPA Driven Protocol, , Not Applicable, PRN, Emery Guzman, DO    metoprolol succinate XL (TOPROL-XL) 24 hr tablet 25 mg, 25 mg, Oral, Daily, Emery Guzman DO, 25 mg at  "02/03/25 0944    nitroglycerin (NITROSTAT) SL tablet 0.4 mg, 0.4 mg, Sublingual, Q5 Min PRN, Emery Guzman,     ondansetron ODT (ZOFRAN-ODT) disintegrating tablet 4 mg, 4 mg, Oral, Q6H PRN **OR** ondansetron (ZOFRAN) injection 4 mg, 4 mg, Intravenous, Q6H PRN, Emery Guzman,     Phosphorus Replacement - Follow Nurse / BPA Driven Protocol, , Not Applicable, PRN, Emery Guzman,     Potassium Replacement - Follow Nurse / BPA Driven Protocol, , Not Applicable, PRN, Emery Guzman, DO    pravastatin (PRAVACHOL) tablet 40 mg, 40 mg, Oral, Daily, Emery Guzman, , 40 mg at 02/03/25 0945    sodium chloride 0.9 % flush 10 mL, 10 mL, Intravenous, PRN, Emery Guzman,     sodium chloride 0.9 % flush 10 mL, 10 mL, Intravenous, Q12H, Emery Guzman,     sodium chloride 0.9 % flush 10 mL, 10 mL, Intravenous, PRN, Emery Guzman,     sodium chloride 0.9 % infusion 40 mL, 40 mL, Intravenous, PRN, Emery Guzman,     Current Outpatient Medications:     allopurinol (ZYLOPRIM) 100 MG tablet, TAKE 1 TABLET BY MOUTH DAILY, Disp: 90 tablet, Rfl: 3    amiodarone (PACERONE) 200 MG tablet, Take 1 tablet by mouth Daily., Disp: , Rfl:     apixaban (ELIQUIS) 2.5 MG tablet tablet, Take 1 tablet by mouth Every 12 (Twelve) Hours for 30 days. Indications: Atrial Fibrillation, Disp: , Rfl:     Apoaequorin (PREVAGEN PO), Take  by mouth., Disp: , Rfl:     ascorbic acid (VITAMIN C) 1000 MG tablet, Take 1 tablet by mouth Daily., Disp: , Rfl:     Calcium Carb-Cholecalciferol 600-5 MG-MCG tablet, Take 1 tablet by mouth Every Other Day. Indications: Low Amount of Calcium in the Blood, Disp: 15 tablet, Rfl: 3    ciclopirox (LOPROX) 1 % shampoo, , Disp: , Rfl:     CINNAMON PO, Take  by mouth., Disp: , Rfl:     Droplet Insulin Syringe 31G X 5/16\" 0.5 ML misc, , Disp: , Rfl:     Dulaglutide (Trulicity) 3 MG/0.5ML solution pen-injector, Inject 0.5 mL under the skin into the appropriate area as " directed 1 (One) Time Per Week., Disp: 6 mL, Rfl: 3    Entresto 24-26 MG tablet, TAKE ONE TABLET BY MOUTH EVERY 12 HOURS, Disp: 180 tablet, Rfl: 3    enzalutamide (XTANDI) 40 MG tablet tablet, Take 3 tablets by mouth Daily., Disp: 90 tablet, Rfl: 11    fluocinonide (LIDEX) 0.05 % external solution, , Disp: , Rfl:     fluticasone (FLONASE) 50 MCG/ACT nasal spray, 2 sprays into the nostril(s) as directed by provider Daily. 2 puffs each nostril (Patient taking differently: Administer 2 sprays into the nostril(s) as directed by provider Daily As Needed for Rhinitis or Allergies.), Disp: 1 bottle, Rfl: 0    furosemide (LASIX) 40 MG tablet, Take 1 tablet by mouth Daily. Take twice a day until swelling improves and then take once daily after that, Disp: 90 tablet, Rfl: 3    glucose blood (True Metrix Blood Glucose Test) test strip, Use to test blood sugar 3 times daily.  Dx E11.65, Disp: 300 each, Rfl: 3    Jardiance 25 MG tablet tablet, TAKE 1 TABLET BY MOUTH DAILY, Disp: 90 tablet, Rfl: 3    ketoconazole (NIZORAL) 2 % shampoo, Apply 1 application  topically to the appropriate area as directed 2 (Two) Times a Week. Indications: Tinea Versicolor, Disp: , Rfl:     Lantus 100 UNIT/ML injection, Inject up to 50 units daily subcutaneously (Patient taking differently: Inject up to 20 units daily subcutaneously), Disp: 20 mL, Rfl: 5    metoprolol succinate XL (TOPROL-XL) 25 MG 24 hr tablet, Take 1 tablet by mouth Daily., Disp: 45 tablet, Rfl: 3    multivitamin with minerals tablet tablet, Take 1 tablet by mouth Daily. AREDS 2  Indications: vitamin deficiency, Disp: , Rfl:     potassium chloride 10 MEQ CR tablet, Take 2 tablets by mouth Daily for 21 days., Disp: 42 tablet, Rfl: 0    pravastatin (PRAVACHOL) 40 MG tablet, TAKE ONE TABLET BY MOUTH EVERY NIGHT AT BEDTIME, Disp: 90 tablet, Rfl: 3    triamcinolone (KENALOG) 0.1 % cream, Apply 1 Application topically to the appropriate area as directed 2 (Two) Times a Day., Disp: , Rfl:  "    VITAMIN D, CHOLECALCIFEROL, PO, Take  by mouth., Disp: , Rfl:   I have utilized all immediate resources to obtain, update, or review the patient's current medications (including all prescription, over-the-counter products, herbals, and/or nutritional supplements).      Vitals: /76   Pulse 92   Temp 98 °F (36.7 °C)   Resp 18   Ht 167.6 cm (66\")   Wt 87.1 kg (192 lb)   SpO2 97%   BMI 30.99 kg/m²   No intake or output data in the 24 hours ending 02/03/25 1209    Physical Exam  Vitals and nursing note reviewed.   Constitutional:       General: He is not in acute distress.     Appearance: He is obese. He is ill-appearing. He is not diaphoretic.      Comments: Pleasant, elderly male lying in bed, NAD   HENT:      Head: Normocephalic and atraumatic.      Mouth/Throat:      Mouth: Mucous membranes are moist.      Pharynx: Oropharynx is clear.   Eyes:      Pupils: Pupils are equal, round, and reactive to light.   Cardiovascular:      Rate and Rhythm: Normal rate and regular rhythm.   Pulmonary:      Effort: No respiratory distress.      Breath sounds: Normal breath sounds.   Abdominal:      General: Bowel sounds are normal. There is no distension.      Palpations: Abdomen is soft.      Tenderness: There is no abdominal tenderness.   Musculoskeletal:         General: Swelling present. Normal range of motion.      Cervical back: Neck supple.   Skin:     General: Skin is warm and dry.      Capillary Refill: Capillary refill takes less than 2 seconds.      Comments: BLE erythematous   Neurological:      Mental Status: He is alert and oriented to person, place, and time.   Psychiatric:         Mood and Affect: Mood normal.         Behavior: Behavior normal.          Diagnostics Review:  Laboratory Data:  Results from last 7 days   Lab Units 02/03/25  0422   SODIUM mmol/L 138   POTASSIUM mmol/L 5.4*   CHLORIDE mmol/L 104   CO2 mmol/L 18.9*   BUN mg/dL 30*   CREATININE mg/dL 1.55*   CALCIUM mg/dL 7.9*   ALBUMIN " "g/dL 3.9   BILIRUBIN mg/dL 1.8*   ALK PHOS U/L 125*   ALT (SGPT) U/L 15   AST (SGOT) U/L 32   GLUCOSE mg/dL 218*     Results from last 7 days   Lab Units 02/03/25  0422   WBC 10*3/mm3 7.93   HEMOGLOBIN g/dL 14.3   HEMATOCRIT % 45.4   PLATELETS 10*3/mm3 108*                   Invalid input(s): \"USDES\", \"NITRITITE\", \"BACT\", \"EP\"  Pain Management Panel  More data exists         4/4/2024 12/29/2022   Pain Management Panel   Creatinine, Urine 10  72.7       Details                     Nutritional Status:   Results from last 7 days   Lab Units 02/03/25  0422   ALBUMIN g/dL 3.9     Body mass index is 30.99 kg/m².  Documented weights    02/03/25 0809   Weight: 87.1 kg (192 lb)        Radiology:  XR Chest 1 View    Result Date: 2/3/2025  PROCEDURE: XR CHEST 1 VW-  HISTORY: SOA Triage Protocol, worsening shortness of breath for 1 day.  COMPARISON: None.  FINDINGS: The heart is enlarged, stable.. Again noted are bilateral pleural effusions, right greater than left; these are stable. Pulmonary vascular congestion and bilateral interstitial disease worse in the lung bases also appears stable. Bibasilar atelectasis or infiltrate is stable.. Again noted is prominence of the right paratracheal region secondary to a dilated ascending aorta as shown on CT chest of 12/21/2024; this is stable.. There is no pneumothorax.  There are no acute osseous abnormalities. Status post median sternotomy. Apical lordotic positioning noted. There is evidence of old calcified granulomatous disease.      Stable appearance of CHF compared to prior. Recommend follow-up..     This report was signed and finalized on 2/3/2025 7:52 AM by Di Ayon MD.         Goals of Care/Advance Care Planning:  Advance Care Planning     1. Determination of Decisional Capacity:  Decisional capacity: YES     2. Advanced Directives:  I have personally reviewed Advance Directives on file in the form of POA  Healthcare surrogate/legal decision maker: Susana" Dietz  Relationship to individual: daughter  Surrogate/decision maker understands role and will honor individual's decisions: YES    3. Patient & Family Meeting:  Members present at meeting Patient, Marie Finch, Jimena Silvestre  Meeting took place at [facility], patient room   Secondary meeting with his daughter by phone    4. Summary of the discussion:  After generalized introductions, introduced the role of palliative care in assisting with complex medical decision making, goals of care discussions, and symptom management/supportive care.  Patient served in the [employer].  He pursue a Bachelor's in Anthropology.  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  He enjoys history, particularly the Civil War.  He has 3 children, 2 sons are estranged.  His daughter, Susana, is active in his life and healthcare needs.   I reviewed patient's acute and chronic illness, he expresses insight regarding the same.  He understands the chronic nature of his illnesses, feeling as though he has been able to keep up with a fairly active lifestyle.  He feels that he is having more fatigue than previous, contemplating cutting out some of his usual activities during the week.  His daughter notes poor compliance with medications, hopeful that if he is able to have improved compliance, some of these symptoms will improve.  I encouraged reflection on what matters most to him, patient recounting his activities that he enjoys including bridge, [employer], and Judaism.  I reviewed medical plan in place, including recent evaluation by cardiology.  He is scheduled to see is cardiologist, Dr. Toussaint, on Thursday.    I reviewed CODE STATUS, at the present FULL CODE.  Patient reports that he was previously DO NOT RESUSCITATE, however has temporarily rescinded this as he desires to complete his taxes.  I reviewed resuscitation, along with interventions.  Patient desires to allow for natural  death, however expresses understanding, informed of the meaning of FULL CODE status.  He desires to maintain this, until he is able to complete his taxes.   I reviewed previous discussions with his appointed HCS/POA, Susana.  He confirms she knows his wishes, they have had several discussions regarding end of life care and wishes even following his death.  I did call and confirm this wish Susnaa, who reports that she is understanding, knowing her father would not want resuscitation, would desire for natural death.  She understands his decision for FULL CODE, that this is temporary.    CODE STATUS reviewed/confirmed: FULL CODE   Baseline Functional Status: Palliative Performance Scale Score: Performance 60% based on the following measures: Ambulation: Reduced, Activity and Evidence of Disease: Unable to do hobby or some work, significant evidence of disease, Self-Care: Occasional assist necessary,  Intake: Normal or reduced, LOC: Full or confusion           Palliative Care Assessment:  HFrEF with acute exacerbation  Atrial flutter with RVR  Valvular heart disease  History of prostate cancer  History of B cell lymphoma  CKD stg III  T2DM  Impaired mobility and ADLs    Recommendations/Plan:  - CODE STATUS reviewed/confirmed: FULL CODE   - GOC discussions yield patient with desire to pursue plan in place with intent for recovery  - Discussed CODE STATUS in detail, he desires to maintain FULL CODE for now, however shares this is temporary and will likely reinstate DNR status following completion of taxes   - Cardiology following, he is scheduled to see his primary Cardiologist, Dr. Toussaint on Thursday   - Encouraged medication compliance, given goals for recovery  - PT/OT as scheduled, he is ambulatory with use of a walker  - At the present, patient does not qualify for hospice services   - Current Functional Status: Palliative Performance Scale Score: Performance 60% based on the following measures: Ambulation:  Reduced, Activity and Evidence of Disease: Unable to do hobby or some work, significant evidence of disease, Self-Care: Occasional assist necessary,  Intake: Normal or reduced, LOC: Full or confusion  - Palliative care will continue to follow/support patient and family        I appreciate the opportunity to participate in the care of Mr. Blane Dietz.  Please do not hesitate to contact me with any questions or concerns.      I spent 60 total minutes conducting chart review for relevant information, face to face assessment, counseling patient and/or family, and coordination of care.  20 minutes spent discussing advanced care planning.  Part of this note may be an electronic transcription/translation of spoken language to printed text using the Dragon Dictation System.       Jimena Silvestre PA-C  02/03/25  12:09 EST

## 2025-02-03 NOTE — CONSULTS
[facility] Cardiology Consult Note    Blane Dietz  1935  [id number]  02/03/25    Requesting Physician: No ref. provider found    Chief Complaint: Shortness of breath    History of Present Illness:   Mr. Blane Dietz is a 89 y.o. male who is being seen by Cardiology for evaluation of shortness of breath.  The patient has a past medical history significant for hypertension, hyperlipidemia, chronic kidney disease, and B-cell lymphoma.  He has a past cardiac history significant for aortic stenosis status post Ross procedure, and atrial flutter.  He also has a history of moderate AI and MR as well as chronic systolic heart failure with an LVEF 35-40% per echocardiogram in 12/24.  He presented to the [facility] emergency department for evaluation of shortness of breath.  The patient is chronically on 2 L nasal cannula.  He reports that he is recently not been feeling well with weakness.  As a result, he has not been taking his metoprolol.  He is also to self discontinued his Eliquis due to subconjunctival hematoma.    Upon evaluation in the emergency department, he was initially tachycardic with ventricular rates in the 160s.  An ECG was suggestive of atrial flutter with RVR.  On initial labs, his creatinine was elevated at 1.5, compared to 1.32 weeks ago.  A proBNP was elevated 11,175.  A troponin was elevated in a plateau fashion of 47, which appears to be near his baseline.  On admission, he was given a dose of metoprolol with subsequent improvement in his ventricular rates.  He was started on Lasix for diuresis.  Cardiology is being consulted for further recommendations.      Review of Systems:   Review of Systems   Constitutional:  Positive for fatigue. Negative for activity change, appetite change, chills, diaphoresis, fever, unexpected weight gain and unexpected weight loss.   Eyes:  Negative for blurred vision and double vision.   Respiratory:  Positive for shortness of breath.  Negative for cough, chest tightness and wheezing.    Cardiovascular:  Positive for leg swelling. Negative for chest pain and palpitations.   Gastrointestinal:  Negative for abdominal pain, anal bleeding, blood in stool and GERD.   Endocrine: Negative for cold intolerance and heat intolerance.   Genitourinary:  Negative for hematuria.   Neurological:  Negative for dizziness, syncope, weakness and light-headedness.   Hematological:  Does not bruise/bleed easily.   Psychiatric/Behavioral:  Negative for depressed mood and stress. The patient is not nervous/anxious.        Past Medical History:   Past Medical History:   Diagnosis Date    Aortic stenosis     status post ROSS procedure, remote, with December 2015 echocardiography revealing normal aortic and pulmonary valve function, normal ejection fraction and mild to moderate MR    Arthritis     Bilateral impacted cerumen 11/23/2016    Bronchitis     CHF (congestive heart failure)     Chronic gout of right foot 06/13/2016    Impression: 03/08/2016 - stable.;     COVID-19 vaccine series completed 05/12/2021    [place] 2nd dose 3/12/21.    Depression     Diabetes mellitus     Diverticulitis     Exogenous obesity     Gout     Heart murmur     History of vitamin D deficiency     Hypercholesteremia 08/11/2016    Hyperlipidemia     Hypertension     Impaired mobility     Kidney lesion     Malignant neoplasm of prostate     Morbid obesity 08/11/2016    Pneumonia 05/12/2021    Primary osteoarthritis involving multiple joints 06/13/2016    Impression: 03/08/2016 - stable;     Pulmonary embolism     Sleep apnea 05/12/2021    C-Pap    Uncontrolled type 2 diabetes mellitus with hyperglycemia 06/13/2016    Impression: 03/08/2016 - seeing Endo .;     Vertigo        Past Surgical History:   Past Surgical History:   Procedure Laterality Date    CARDIAC VALVE REPLACEMENT  05/12/2021    Ross procedure 22 years ago    COLON SURGERY      COLONOSCOPY      COLOSTOMY  1999    COLOSTOMY REVISION       CYSTOSCOPY N/A 12/7/2021    Procedure: CYSTOSCOPY FLEXIBLE;  Surgeon: José Miguel Villafuerte Jr., MD;  Location:  [facility];  Service: Urology;  Laterality: N/A;    EXPLORATORY LAPAROTOMY      With Tissue Removal    LIPOMA EXCISION      MOHS SURGERY      NEPHROURETERECTOMY Right 12/7/2021    Procedure: RIGHT NEPHROURETERECTOMY LAPAROSCOPIC WITH DAVINCI ROBOT;  Surgeon: José Miguel Villafuerte Jr., MD;  Location:  [facility];  Service: Robotics - DaVinci;  Laterality: Right;    OTHER SURGICAL HISTORY      Porcine Valve    PROSTATE SURGERY      PROSTATECTOMY  2000     History of Prostatectomy Perineal Radical    SKIN CANCER EXCISION      from head and lip    TONSILLECTOMY         Family History:   Family History   Problem Relation Age of Onset    Diabetes Mother     Lung cancer Mother     Cancer Mother     Heart disease Father     Breast cancer Other     Cancer Other     Hypertension Other     Migraines Other     Obesity Other     Diabetes Other        Social History:   Social History     Socioeconomic History    Marital status: Single   Tobacco Use    Smoking status: Never     Passive exposure: Never    Smokeless tobacco: Never   Vaping Use    Vaping status: Never Used   Substance and Sexual Activity    Alcohol use: No    Drug use: No    Sexual activity: Not Currently       Medications:     Current Facility-Administered Medications:     acetaminophen (TYLENOL) tablet 650 mg, 650 mg, Oral, Q4H PRN, Emery Guzman DO    allopurinol (ZYLOPRIM) tablet 100 mg, 100 mg, Oral, Daily, Emery Guzman DO    amiodarone (PACERONE) tablet 200 mg, 200 mg, Oral, Daily, Emery Guzman DO    apixaban (ELIQUIS) tablet 2.5 mg, 2.5 mg, Oral, Q12H, Emery Guzman DO    ascorbic acid (VITAMIN C) tablet 1,000 mg, 1,000 mg, Oral, Daily, Emery Guzman DO    sennosides-docusate (PERICOLACE) 8.6-50 MG per tablet 2 tablet, 2 tablet, Oral, BID PRN **AND** polyethylene glycol (MIRALAX) packet 17 g, 17 g, Oral, Daily  PRN **AND** bisacodyl (DULCOLAX) EC tablet 5 mg, 5 mg, Oral, Daily PRN **AND** bisacodyl (DULCOLAX) suppository 10 mg, 10 mg, Rectal, Daily PRN, Emery Guzman,     calcium carbonate (TUMS) chewable tablet 500 mg (200 mg elemental), 2 tablet, Oral, BID PRN, Emery Guzman,     Calcium Replacement - Follow Nurse / BPA Driven Protocol, , Not Applicable, PRN, Emery Guzman,     dextrose (D50W) (25 g/50 mL) IV injection 10-50 mL, 10-50 mL, Intravenous, Q15 Min PRN, Emery Guzman DO    dextrose (GLUTOSE) oral gel 15 g, 15 g, Oral, Q15 Min PRN, Emery Guzman,     empagliflozin (JARDIANCE) tablet 25 mg, 25 mg, Oral, Daily, Emery Guzman DO    furosemide (LASIX) tablet 40 mg, 40 mg, Oral, BID, Emery Guzamn,     Glucagon (GLUCAGEN) injection 1 mg, 1 mg, Intramuscular, Q15 Min PRN, Emery Guzman,     insulin glargine (LANTUS, SEMGLEE) injection 1-200 Units, 1-200 Units, Subcutaneous, Nightly - Glucommander, Emery Guzman DO    insulin lispro (humaLOG) injection 1-200 Units, 1-200 Units, Subcutaneous, 4x Daily With Meals & Nightly, Emery Guzman, , 4 Units at 02/03/25 0914    insulin lispro (humaLOG) injection 1-200 Units, 1-200 Units, Subcutaneous, PRN, Emery Guzman,     Magnesium Standard Dose Replacement - Follow Nurse / BPA Driven Protocol, , Not Applicable, PRN, Emery Guzman,     metoprolol succinate XL (TOPROL-XL) 24 hr tablet 25 mg, 25 mg, Oral, Daily, Emery Guzman,     nitroglycerin (NITROSTAT) SL tablet 0.4 mg, 0.4 mg, Sublingual, Q5 Min PRN, Emery Guzman,     ondansetron ODT (ZOFRAN-ODT) disintegrating tablet 4 mg, 4 mg, Oral, Q6H PRN **OR** ondansetron (ZOFRAN) injection 4 mg, 4 mg, Intravenous, Q6H PRN, Emery Guzman, DO    Phosphorus Replacement - Follow Nurse / BPA Driven Protocol, , Not Applicable, PRN, Emery Guzman, DO    Potassium Replacement - Follow Nurse / BPA Driven Protocol, , Not  "Applicable, PRN, Emery Guzmanith, DO    pravastatin (PRAVACHOL) tablet 40 mg, 40 mg, Oral, Daily, Emery Guzman, DO    sodium chloride 0.9 % flush 10 mL, 10 mL, Intravenous, PRN, Emery Guzmanith, DO    sodium chloride 0.9 % flush 10 mL, 10 mL, Intravenous, Q12H, Megan Emery Mike, DO    sodium chloride 0.9 % flush 10 mL, 10 mL, Intravenous, PRN, Emery Guzmanith, DO    sodium chloride 0.9 % infusion 40 mL, 40 mL, Intravenous, PRN, Emery Guzmanith, DO    Current Outpatient Medications:     allopurinol (ZYLOPRIM) 100 MG tablet, TAKE 1 TABLET BY MOUTH DAILY, Disp: 90 tablet, Rfl: 3    amiodarone (PACERONE) 200 MG tablet, Take 1 tablet by mouth Daily., Disp: , Rfl:     apixaban (ELIQUIS) 2.5 MG tablet tablet, Take 1 tablet by mouth Every 12 (Twelve) Hours for 30 days. Indications: Atrial Fibrillation, Disp: , Rfl:     Apoaequorin (PREVAGEN PO), Take  by mouth., Disp: , Rfl:     ascorbic acid (VITAMIN C) 1000 MG tablet, Take 1 tablet by mouth Daily., Disp: , Rfl:     Calcium Carb-Cholecalciferol 600-5 MG-MCG tablet, Take 1 tablet by mouth Every Other Day. Indications: Low Amount of Calcium in the Blood, Disp: 15 tablet, Rfl: 3    ciclopirox (LOPROX) 1 % shampoo, , Disp: , Rfl:     CINNAMON PO, Take  by mouth., Disp: , Rfl:     Droplet Insulin Syringe 31G X 5/16\" 0.5 ML misc, , Disp: , Rfl:     Dulaglutide (Trulicity) 3 MG/0.5ML solution pen-injector, Inject 0.5 mL under the skin into the appropriate area as directed 1 (One) Time Per Week., Disp: 6 mL, Rfl: 3    Entresto 24-26 MG tablet, TAKE ONE TABLET BY MOUTH EVERY 12 HOURS, Disp: 180 tablet, Rfl: 3    enzalutamide (XTANDI) 40 MG tablet tablet, Take 3 tablets by mouth Daily., Disp: 90 tablet, Rfl: 11    fluocinonide (LIDEX) 0.05 % external solution, , Disp: , Rfl:     fluticasone (FLONASE) 50 MCG/ACT nasal spray, 2 sprays into the nostril(s) as directed by provider Daily. 2 puffs each nostril (Patient taking differently: Administer 2 sprays into " the nostril(s) as directed by provider Daily As Needed for Rhinitis or Allergies.), Disp: 1 bottle, Rfl: 0    furosemide (LASIX) 40 MG tablet, Take 1 tablet by mouth Daily. Take twice a day until swelling improves and then take once daily after that, Disp: 90 tablet, Rfl: 3    glucose blood (True Metrix Blood Glucose Test) test strip, Use to test blood sugar 3 times daily.  Dx E11.65, Disp: 300 each, Rfl: 3    Jardiance 25 MG tablet tablet, TAKE 1 TABLET BY MOUTH DAILY, Disp: 90 tablet, Rfl: 3    ketoconazole (NIZORAL) 2 % shampoo, Apply 1 application  topically to the appropriate area as directed 2 (Two) Times a Week. Indications: Tinea Versicolor, Disp: , Rfl:     Lantus 100 UNIT/ML injection, Inject up to 50 units daily subcutaneously (Patient taking differently: Inject up to 20 units daily subcutaneously), Disp: 20 mL, Rfl: 5    metoprolol succinate XL (TOPROL-XL) 25 MG 24 hr tablet, Take 1 tablet by mouth Daily., Disp: 45 tablet, Rfl: 3    multivitamin with minerals tablet tablet, Take 1 tablet by mouth Daily. AREDS 2  Indications: vitamin deficiency, Disp: , Rfl:     potassium chloride 10 MEQ CR tablet, Take 2 tablets by mouth Daily for 21 days., Disp: 42 tablet, Rfl: 0    pravastatin (PRAVACHOL) 40 MG tablet, TAKE ONE TABLET BY MOUTH EVERY NIGHT AT BEDTIME, Disp: 90 tablet, Rfl: 3    triamcinolone (KENALOG) 0.1 % cream, Apply 1 Application topically to the appropriate area as directed 2 (Two) Times a Day., Disp: , Rfl:     VITAMIN D, CHOLECALCIFEROL, PO, Take  by mouth., Disp: , Rfl:     Allergies:   Allergies   Allergen Reactions    Ampicillin Anaphylaxis    Medrol [Methylprednisolone] Unknown - High Severity     Made his blood sugar get really high, can't take this    Myrbetriq [Mirabegron] Unknown - High Severity     High BP    Penicillins Anaphylaxis and Shortness Of Breath     Beta lactam allergy details  Antibiotic reaction: (!) shortness of breath  Age at reaction: adult  Dose to reaction time: (!)  "minutes  Reason for antibiotic: other  Epinephrine required for reaction?: (!) yes           Bee Venom Swelling    Melatonin Hallucinations       Physical Exam:  Vital Signs:   Vitals:    02/03/25 0600 02/03/25 0615 02/03/25 0630 02/03/25 0809   BP: 101/66 100/68 101/73    BP Location:       Patient Position:       Pulse: 92 91 91    Resp:       Temp:       TempSrc:       SpO2: 96% 99% 100%    Weight:    87.1 kg (192 lb)   Height:    167.6 cm (66\")       Physical Exam  Vitals and nursing note reviewed.   Constitutional:       General: He is not in acute distress.     Appearance: Normal appearance. He is not diaphoretic.   HENT:      Head: Normocephalic and atraumatic.   Cardiovascular:      Rate and Rhythm: Normal rate.      Heart sounds: Murmur heard.   Pulmonary:      Effort: Pulmonary effort is normal. No respiratory distress.      Breath sounds: Normal breath sounds. No stridor. No wheezing, rhonchi or rales.   Abdominal:      General: Bowel sounds are normal. There is no distension.      Palpations: Abdomen is soft.      Tenderness: There is no abdominal tenderness. There is no guarding or rebound.   Musculoskeletal:         General: Swelling present. Normal range of motion.      Cervical back: Neck supple. No tenderness.   Skin:     General: Skin is warm and dry.   Neurological:      General: No focal deficit present.      Mental Status: He is alert and oriented to person, place, and time.   Psychiatric:         Mood and Affect: Mood normal.         Behavior: Behavior normal.         Results Review:   Results from last 7 days   Lab Units 02/03/25  0422   SODIUM mmol/L 138   POTASSIUM mmol/L 5.4*   CHLORIDE mmol/L 104   CO2 mmol/L 18.9*   BUN mg/dL 30*   CREATININE mg/dL 1.55*   CALCIUM mg/dL 7.9*   BILIRUBIN mg/dL 1.8*   ALK PHOS U/L 125*   ALT (SGPT) U/L 15   AST (SGOT) U/L 32   GLUCOSE mg/dL 218*     Results from last 7 days   Lab Units 02/03/25  0615 02/03/25  0422   HSTROP T ng/L 47* 47*     Results from " last 7 days   Lab Units 02/03/25  0422   WBC 10*3/mm3 7.93   HEMOGLOBIN g/dL 14.3   HEMATOCRIT % 45.4   PLATELETS 10*3/mm3 108*                   I personally viewed and interpreted the patient's EKG/Telemetry data     Assessment:  1.  Acute on chronic systolic heart failure  2.  Atrial flutter with rapid ventricular rates  3.  Valvular heart disease  4.  Stage IIIb CKD  5.  Type 2 diabetes mellitus    Plan:  1.  Acute on chronic systolic heart failure  --Echocardiogram in 12/24 showed LVEF 35-40%  -- Remains volume overloaded, suspect flutter with rapid ventricular rates exacerbating CHF  -- Received oral Lasix this morning, consider an IV dose this afternoon for additional diuresis  -- Continue metoprolol and Jardiance  -- Consider restarting Entresto pending renal function and if able to be tolerated by BP    2.  Atrial flutter with RVR  -- RVR likely secondary to noncompliance with medications  -- Ventricular rate improved with restarting metoprolol, continue current dose  -- Can continue amiodarone for now  -- Restart Eliquis for CVA prophylaxis  -- Consider referral for ablation upon follow-up with his outpatient cardiologist    3.  Valvular heart disease  -- Status post Ross procedure  -- Recent echocardiogram shows moderate AI and MR  -- Follow-up with his established outpatient cardiologist      Thank you for allowing me to participate in the care of your patient. Please do not hesitate to contact me with additional questions or concerns.     KELL Ortiz MD  Interventional Cardiology   02/03/25  09:34 EST

## 2025-02-03 NOTE — H&P
[facility]   HISTORY AND PHYSICAL      Name:  Blane Dietz   Age:  89 y.o.  Sex:  male  :  1935  MRN:  [id number]   Visit Number:  [id number]  Admission Date:  2/3/2025  Date Of Service:  25  Primary Care Physician:  David Em MD    Chief Complaint:     dyspnea    History Of Presenting Illness:      89-year-old male resident [place].  Past medical history of atrial fibrillation, has had a procedure on his aortic and pulmonic valves in the past involving a porcine valve, CHF EF 36 to 40% with associated moderate mitral regurgitation, chronically on 2 L.  Chief complaint dyspnea.  Patient had a presented with acute dyspnea and fluid overload.  Reported not taking his metoprolol as he had not been feeling well. Was very weak.  Has not been taking Eliquis due to subconjunctival hematoma.  Rate control improved with administration of the metoprolol in the ED.  Patient continued to have low oxygen levels and dyspnea so we were asked to admit him.  Started CPAP in the ED which he is chronically on at night. Patient feels he is back near his baseline at this time.  Full code.    Pertinent findings: Highly sensitive troponin 47, proBNP 11,175, potassium 5.4, anion gap 15.1, creatinine 1.55, total bilirubin 1.8, WBC 7.93, COVID and flu negative, chest x-ray shows sternotomy wires bilateral effusions right greater than left interstitial prominence, EKG shows irregular and erratic variable morphology wide-complex tachycardia patient does have a history of RBBB    ED Medications:    Medications   sodium chloride 0.9 % flush 10 mL (has no administration in time range)   metoprolol tartrate (LOPRESSOR) injection 5 mg (5 mg Intravenous Given 2/3/25 0434)       Edited by: Emery Guzman DO at 2/3/2025 0652     Review Of Systems:    All systems were reviewed and negative except as mentioned in history of presenting illness, assessment and plan.    Past Medical History:  Patient  has a past medical history of Aortic stenosis, Arthritis, Bilateral impacted cerumen (11/23/2016), Bronchitis, CHF (congestive heart failure), Chronic gout of right foot (06/13/2016), COVID-19 vaccine series completed (05/12/2021), Depression, Diabetes mellitus, Diverticulitis, Exogenous obesity, Gout, Heart murmur, History of vitamin D deficiency, Hypercholesteremia (08/11/2016), Hyperlipidemia, Hypertension, Impaired mobility, Kidney lesion, Malignant neoplasm of prostate, Morbid obesity (08/11/2016), Pneumonia (05/12/2021), Primary osteoarthritis involving multiple joints (06/13/2016), Pulmonary embolism, Sleep apnea (05/12/2021), Uncontrolled type 2 diabetes mellitus with hyperglycemia (06/13/2016), and Vertigo.    Past Surgical History: Patient  has a past surgical history that includes Colostomy (1999); Other surgical history; Exploratory laparotomy; Colonoscopy; Revision Colostomy; Prostate surgery; Prostatectomy (2000); Colon surgery; Tonsillectomy; Skin cancer excision; Lipoma Excision; Mohs surgery; Cardiac valve replacement (05/12/2021); nephroureterectomy (Right, 12/7/2021); and Cystoscopy (N/A, 12/7/2021).    Social History: Patient  reports that he has never smoked. He has never been exposed to tobacco smoke. He has never used smokeless tobacco. He reports that he does not drink alcohol and does not use drugs.    Family History:  Patient's family history has been reviewed and found to be noncontributory.     Allergies:      Ampicillin, Medrol [methylprednisolone], Myrbetriq [mirabegron], Penicillins, Bee venom, and Melatonin    Home Medications:    Prior to Admission Medications       Prescriptions Last Dose Informant Patient Reported? Taking?    allopurinol (ZYLOPRIM) 100 MG tablet   No No    TAKE 1 TABLET BY MOUTH DAILY    amiodarone (PACERONE) 200 MG tablet   Yes No    Take 1 tablet by mouth Daily.    apixaban (ELIQUIS) 2.5 MG tablet tablet   No No    Take 1 tablet by mouth Every 12  "(Twelve) Hours for 30 days. Indications: Atrial Fibrillation    Apoaequorin (PREVAGEN PO)   Yes No    Take  by mouth.    ascorbic acid (VITAMIN C) 1000 MG tablet   Yes No    Take 1 tablet by mouth Daily.    Calcium Carb-Cholecalciferol 600-5 MG-MCG tablet   No No    Take 1 tablet by mouth Every Other Day. Indications: Low Amount of Calcium in the Blood    ciclopirox (LOPROX) 1 % shampoo   Yes No    CINNAMON PO   Yes No    Take  by mouth.    Droplet Insulin Syringe 31G X 5/16\" 0.5 ML misc   Yes No    Dulaglutide (Trulicity) 3 MG/0.5ML solution pen-injector   No No    Inject 0.5 mL under the skin into the appropriate area as directed 1 (One) Time Per Week.    Entresto 24-26 MG tablet   No No    TAKE ONE TABLET BY MOUTH EVERY 12 HOURS    enzalutamide (XTANDI) 40 MG tablet tablet   No No    Take 3 tablets by mouth Daily.    fluocinonide (LIDEX) 0.05 % external solution   Yes No    fluticasone (FLONASE) 50 MCG/ACT nasal spray   No No    2 sprays into the nostril(s) as directed by provider Daily. 2 puffs each nostril    Patient taking differently:  Administer 2 sprays into the nostril(s) as directed by provider Daily As Needed for Rhinitis or Allergies.    furosemide (LASIX) 40 MG tablet   No No    Take 1 tablet by mouth Daily. Take twice a day until swelling improves and then take once daily after that    glucose blood (True Metrix Blood Glucose Test) test strip   No No    Use to test blood sugar 3 times daily.  Dx E11.65    Jardiance 25 MG tablet tablet   No No    TAKE 1 TABLET BY MOUTH DAILY    ketoconazole (NIZORAL) 2 % shampoo   Yes No    Apply 1 application  topically to the appropriate area as directed 2 (Two) Times a Week. Indications: Tinea Versicolor    Lantus 100 UNIT/ML injection   No No    Inject up to 50 units daily subcutaneously    Patient taking differently:  Inject up to 20 units daily subcutaneously    metoprolol succinate XL (TOPROL-XL) 25 MG 24 hr tablet   No No    Take 1 tablet by mouth Daily.    " multivitamin with minerals tablet tablet   Yes No    Take 1 tablet by mouth Daily. AREDS 2  Indications: vitamin deficiency    potassium chloride 10 MEQ CR tablet   No No    Take 2 tablets by mouth Daily for 21 days.    pravastatin (PRAVACHOL) 40 MG tablet   No No    TAKE ONE TABLET BY MOUTH EVERY NIGHT AT BEDTIME    triamcinolone (KENALOG) 0.1 % cream   Yes No    Apply 1 Application topically to the appropriate area as directed 2 (Two) Times a Day.    VITAMIN D, CHOLECALCIFEROL, PO   Yes No    Take  by mouth.              Vital Signs:  Temp:  [98 °F (36.7 °C)] 98 °F (36.7 °C)  Heart Rate:  [] 91  Resp:  [20] 20  BP: ()/(66-92) 101/73    There were no vitals filed for this visit.  There is no height or weight on file to calculate BMI.    Physical Exam:     Most recent vital Signs: /73   Pulse 91   Temp 98 °F (36.7 °C) (Oral)   Resp 20   SpO2 100%     Constitutional: Awake, alert  Eyes: PERRLA, sclerae anicteric, no conjunctival injection  HENT: NCAT, mucous membranes moist  Neck: Supple, no thyromegaly, no lymphadenopathy, trachea midline  Respiratory: Bibasilar Rales bilaterally, nonlabored respirations on CPAP  Cardiovascular: RRR, no murmurs, rubs, or gallops, palpable pedal pulses bilaterally  Gastrointestinal: Positive bowel sounds, soft, nontender, nondistended  Musculoskeletal: 3+ bilateral ankle edema, no clubbing or cyanosis to extremities  Psychiatric: Appropriate affect, cooperative  Neurologic: Oriented x 3, speech clear  Skin: No rashes  Edited by: Emery Guzman DO at 2/3/2025 0631      Laboratory data:    I have reviewed the labs done in the emergency room.    Results from last 7 days   Lab Units 02/03/25  0422   SODIUM mmol/L 138   POTASSIUM mmol/L 5.4*   CHLORIDE mmol/L 104   CO2 mmol/L 18.9*   BUN mg/dL 30*   CREATININE mg/dL 1.55*   CALCIUM mg/dL 7.9*   BILIRUBIN mg/dL 1.8*   ALK PHOS U/L 125*   ALT (SGPT) U/L 15   AST (SGOT) U/L 32   GLUCOSE mg/dL 218*     Results from  "last 7 days   Lab Units 02/03/25  0422   WBC 10*3/mm3 7.93   HEMOGLOBIN g/dL 14.3   HEMATOCRIT % 45.4   PLATELETS 10*3/mm3 108*         Results from last 7 days   Lab Units 02/03/25  0615 02/03/25 0422   HSTROP T ng/L 47* 47*     Results from last 7 days   Lab Units 02/03/25 0422   PROBNP pg/mL 11,175.0*                       Invalid input(s): \"USDES\", \"NITRITITE\", \"BACT\", \"EP\"    Pain Management Panel  More data exists         4/4/2024 12/29/2022   Pain Management Panel   Creatinine, Urine 10  72.7       Details                   EKG:      EKG shows irregular and erratic variable morphology wide-complex tachycardia patient does have a history of RBBB    Radiology:    No radiology results for the last 3 days    Assessment/Plan:      Atrial arrhythmia    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)    Valvular heart disease    RBBB  -- Suspect accelerated rate due to nonadherence to beta-blocker.  Suspect patient's heart failure is worsening.  In combination with mitral regurgitation his heart function is likely much worse than his EF would suggest.  His blood pressure at this time does not allow for much diuresis.  Will have cardiology to consult on him and make recommendations.  Otherwise continue his home medicines.  -- Active Treatments: Hold Entresto to facilitate diuresis, p.o. Lasix, continue metoprolol and amiodarone as well as Eliquis  -- Pending Results: Cardiology, palliative, PT/OT          Type 2 diabetes mellitus with hyperglycemia, with long-term current use of insulin    Stage 3b chronic kidney disease  -- Chronic medical conditions  -- Active Treatments: Subcutaneous insulin  -- Pending Results:serial labs            DVT Prophylaxis: Eliquis  Code Status: Full  Diet: Cardiac diabetic renal  Risk assessment: High anticipate greater than two night stay          Edited by: Emery Guzman DO at 2/3/2025 0675           Advance Care Planning   ACP discussion was held with " the patient during this visit. Patient has an advance directive in EMR which is still valid.            Emery Guzman DO  02/03/25  06:57 EST    Dictated utilizing Dragon dictation.

## 2025-02-03 NOTE — PLAN OF CARE
"Goal Outcome Evaluation:      Palliative Care Team Consult received for Goals of Care Discussion/ACP    Pt is currently CODE STATUS is FULL CODE--CPR and Full Support.    Pt does have a \"Special POA\" with [employer] in the EMR.    Pt admitted via ED from [facility] with report of Dyspnea, Fluid Overload, Decreased O2, Fatigue, Lightheadness and Dizziness.  Pt was noted to be in A. Fib with RVR on admission.  CPAP (which pt uses at [facility]) was placed in ED.       Admission Assessment:  Atrial Arrhythmia, Acute on Chronic HFrEF (EF in Dec 38%), Valvular heart disease (S/P Ross procedure), RBBB.    Palliative Care Team--Jimena VILLA and Marie Finch RN visited pt this morning.  Pt awake, alert and oriented.  No family present at this time.   O2 per NC in place with 3 L O2 per NC.  O2 sat 98%    HR 92/min    Jimena explained we were the Palliaitive Care Team and his Dr requested we visit him.   Jimena explained what PC is in general and that we focus on \"what's important to him\" during illness.   PC also helps with making sure we are following a pt's wishes and that they let their families know of their wishes for treatment.      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   His daughter, Susana Dietz,  is his POA and HC surrogate.  He also has a son and another daughter (estranged).      He reported living at [facility] and participates in activities at the facility.  He uses a walker.  Pt did admit to approx a 10 lb wt loss over 6 months.      Jimena directed the conversation to what he knows about his current health.  He reported having to take medication for his heart rate.  He is aware of the fluid in his legs.  He did report that at the facility he had a DNR which he revoked but plans to reinstate \"after taxes\".     1210   Jimena contacted pt's daughter, Susana, to update her " on their conversation     PC will continue to follow

## 2025-02-03 NOTE — THERAPY EVALUATION
OT order received. Pt declined OOB activity due to not feeling well this date and requested therapy check back tomorrow. OT to follow up at later date.

## 2025-02-03 NOTE — Clinical Note
Level of Care: Telemetry [5]   Diagnosis: Atrial fibrillation with rapid ventricular response [[id number]]   Certification: I Certify That Inpatient Hospital Services Are Medically Necessary For Greater Than 2 Midnights

## 2025-02-03 NOTE — THERAPY EVALUATION
Pt declined PT evaluation this date; states he is not feeling well and requests PT to follow-up tomorrow.

## 2025-02-03 NOTE — ED PROVIDER NOTES
[facility] EMERGENCY DEPARTMENT  Emergency Department Encounter  Emergency Medicine Physician Note     Pt Name:Blane Dietz  MRN: [id number]  Birthdate 1935  Date of evaluation: 2/3/2025  PCP:  David Em MD  Note Started: 4:07 AM EST      CHIEF COMPLAINT       Chief Complaint   Patient presents with    Shortness of Breath       HISTORY OF PRESENT ILLNESS  (Location/Symptom, Timing/Onset, Context/Setting, Quality, Duration, Modifying Factors, Severity.)      Blane Dietz is a 89 y.o. male who presents with fatigue, feeling sick, shortness of breath lightheadedness and dizziness.  Patient states that he has not taken his rate control metoprolol medication for 5 days.  Patient does have a history of A-fib.  Patient has appointment with cardiology coming up.  Patient states that he recently stopped taking his Eliquis as he had a bleeding in his eye.  Chart review demonstrates that patient was unable to afford his Eliquis.  Patient denies any fevers or chills or feeling of viral-like symptoms.  Patient does describe increased swelling of bilateral lower extremities    PAST MEDICAL / SURGICAL / SOCIAL / FAMILY HISTORY     Past Medical History:   Diagnosis Date    Aortic stenosis     status post ROSS procedure, remote, with December 2015 echocardiography revealing normal aortic and pulmonary valve function, normal ejection fraction and mild to moderate MR    Arthritis     Bilateral impacted cerumen 11/23/2016    Bronchitis     CHF (congestive heart failure)     Chronic gout of right foot 06/13/2016    Impression: 03/08/2016 - stable.;     COVID-19 vaccine series completed 05/12/2021    Mad[place] 2nd dose 3/12/21.    Depression     Diabetes mellitus     Diverticulitis     Exogenous obesity     Gout     Heart murmur     History of vitamin D deficiency     Hypercholesteremia 08/11/2016    Hyperlipidemia     Hypertension     Impaired mobility     Kidney lesion     Malignant neoplasm of prostate      Morbid obesity 08/11/2016    Pneumonia 05/12/2021    Primary osteoarthritis involving multiple joints 06/13/2016    Impression: 03/08/2016 - stable;     Pulmonary embolism     Sleep apnea 05/12/2021    C-Pap    Uncontrolled type 2 diabetes mellitus with hyperglycemia 06/13/2016    Impression: 03/08/2016 - seeing Endo .;     Vertigo      No additional pertinent       Past Surgical History:   Procedure Laterality Date    CARDIAC VALVE REPLACEMENT  05/12/2021    Ross procedure 22 years ago    COLON SURGERY      COLONOSCOPY      COLOSTOMY  1999    COLOSTOMY REVISION      CYSTOSCOPY N/A 12/7/2021    Procedure: CYSTOSCOPY FLEXIBLE;  Surgeon: José Miguel Villafuerte Jr., MD;  Location:  [facility];  Service: Urology;  Laterality: N/A;    EXPLORATORY LAPAROTOMY      With Tissue Removal    LIPOMA EXCISION      MOHS SURGERY      NEPHROURETERECTOMY Right 12/7/2021    Procedure: RIGHT NEPHROURETERECTOMY LAPAROSCOPIC WITH DAVINCI ROBOT;  Surgeon: José Miguel Villafuerte Jr., MD;  Location:  [facility];  Service: Robotics - DaVinci;  Laterality: Right;    OTHER SURGICAL HISTORY      Porcine Valve    PROSTATE SURGERY      PROSTATECTOMY  2000     History of Prostatectomy Perineal Radical    SKIN CANCER EXCISION      from head and lip    TONSILLECTOMY       No additional pertinent       Social History     Socioeconomic History    Marital status: Single   Tobacco Use    Smoking status: Never     Passive exposure: Never    Smokeless tobacco: Never   Vaping Use    Vaping status: Never Used   Substance and Sexual Activity    Alcohol use: No    Drug use: No    Sexual activity: Not Currently       Family History   Problem Relation Age of Onset    Diabetes Mother     Lung cancer Mother     Cancer Mother     Heart disease Father     Breast cancer Other     Cancer Other     Hypertension Other     Migraines Other     Obesity Other     Diabetes Other        Allergies:  Ampicillin, Medrol [methylprednisolone], Myrbetriq [mirabegron], Penicillins,  "Bee venom, and Melatonin    Home Medications:  Prior to Admission medications    Medication Sig Start Date End Date Taking? Authorizing Provider   allopurinol (ZYLOPRIM) 100 MG tablet TAKE 1 TABLET BY MOUTH DAILY 10/7/24   David Em MD   amiodarone (PACERONE) 200 MG tablet Take 1 tablet by mouth Daily. 12/5/22   Ha Toussaint MD   apixaban (ELIQUIS) 2.5 MG tablet tablet Take 1 tablet by mouth Every 12 (Twelve) Hours for 30 days. Indications: Atrial Fibrillation 1/23/25 2/22/25  David Em MD   Apoaequorin (PREVAGEN PO) Take  by mouth.    Dave Devi MD   ascorbic acid (VITAMIN C) 1000 MG tablet Take 1 tablet by mouth Daily.    Dave Devi MD   Calcium Carb-Cholecalciferol 600-5 MG-MCG tablet Take 1 tablet by mouth Every Other Day. Indications: Low Amount of Calcium in the Blood 7/17/24   Olimpia Real APRN   ciclopirox (LOPROX) 1 % shampoo  8/21/24   Dave Devi MD   CINNAMON PO Take  by mouth.    Dave Devi MD   Droplet Insulin Syringe 31G X 5/16\" 0.5 ML misc  4/24/24   Dave Devi MD   Dulaglutide (Trulicity) 3 MG/0.5ML solution pen-injector Inject 0.5 mL under the skin into the appropriate area as directed 1 (One) Time Per Week. 7/25/24   David Em MD   Entresto 24-26 MG tablet TAKE ONE TABLET BY MOUTH EVERY 12 HOURS 10/24/24   David Em MD   enzalutamide (XTANDI) 40 MG tablet tablet Take 3 tablets by mouth Daily. 9/25/24   Shannan Ramon MD   fluocinonide (LIDEX) 0.05 % external solution  8/19/24   Dave Devi MD   fluticasone (FLONASE) 50 MCG/ACT nasal spray 2 sprays into the nostril(s) as directed by provider Daily. 2 puffs each nostril  Patient taking differently: Administer 2 sprays into the nostril(s) as directed by provider Daily As Needed for Rhinitis or Allergies. 12/20/19   Rosanna Nicolas APRN   furosemide (LASIX) 40 MG tablet Take 1 tablet by mouth Daily. Take twice a day until swelling improves and " then take once daily after that 1/23/25   David Em MD   glucose blood (True Metrix Blood Glucose Test) test strip Use to test blood sugar 3 times daily.  Dx E11.65 4/30/24   Sophia Ferreira PA   Jardiance 25 MG tablet tablet TAKE 1 TABLET BY MOUTH DAILY 10/7/24   David Em MD   ketoconazole (NIZORAL) 2 % shampoo Apply 1 application  topically to the appropriate area as directed 2 (Two) Times a Week. Indications: Tinea Versicolor 5/26/23   Dave Devi MD   Lantus 100 UNIT/ML injection Inject up to 50 units daily subcutaneously  Patient taking differently: Inject up to 20 units daily subcutaneously 7/21/23   Xavier Boston MD   metoprolol succinate XL (TOPROL-XL) 25 MG 24 hr tablet Take 1 tablet by mouth Daily. 1/23/25   David Em MD   multivitamin with minerals tablet tablet Take 1 tablet by mouth Daily. AREDS 2  Indications: vitamin deficiency    Dave Devi MD   potassium chloride 10 MEQ CR tablet Take 2 tablets by mouth Daily for 21 days. 1/16/25 2/6/25  Luis Bustamante DO   pravastatin (PRAVACHOL) 40 MG tablet TAKE ONE TABLET BY MOUTH EVERY NIGHT AT BEDTIME 10/7/24   David Em MD   triamcinolone (KENALOG) 0.1 % cream Apply 1 Application topically to the appropriate area as directed 2 (Two) Times a Day.    Dave Devi MD   VITAMIN D, CHOLECALCIFEROL, PO Take  by mouth.    Dave Devi MD         REVIEW OF SYSTEMS       Review of Systems   Constitutional:  Positive for fatigue. Negative for chills and fever.   Respiratory:  Positive for chest tightness and shortness of breath.    Cardiovascular:  Positive for leg swelling. Negative for chest pain.   Gastrointestinal:  Negative for abdominal pain, diarrhea, nausea and vomiting.   Genitourinary:  Negative for flank pain.   Neurological:  Negative for dizziness and light-headedness.       PHYSICAL EXAM      INITIAL VITALS:   /73   Pulse 91   Temp 98 °F (36.7 °C) (Oral)    Resp 20   SpO2 100%     Physical Exam  Constitutional:       Appearance: Normal appearance.   HENT:      Head: Normocephalic and atraumatic.   Eyes:      Extraocular Movements: Extraocular movements intact.   Cardiovascular:      Rate and Rhythm: Normal rate and regular rhythm.   Pulmonary:      Effort: Tachypnea and respiratory distress present.      Breath sounds: Examination of the right-lower field reveals rales. Examination of the left-lower field reveals rales. Decreased breath sounds and rales present.   Abdominal:      General: Abdomen is flat.      Palpations: Abdomen is soft.      Tenderness: There is no abdominal tenderness.   Musculoskeletal:      Right lower leg: Edema present.      Left lower leg: Edema present.   Skin:     General: Skin is warm and dry.   Neurological:      General: No focal deficit present.      Mental Status: He is alert and oriented to person, place, and time.   Psychiatric:         Mood and Affect: Mood normal.         Behavior: Behavior normal.           DDX/DIAGNOSTIC RESULTS / EMERGENCY DEPARTMENT COURSE / MDM     Differential Diagnosis included but not limited: CHF exacerbation, A-fib with rapid ventricular rate, medication noncompliance, ACS, pneumonia, fluid overload travel, leg swelling.    Diagnoses Considered but Do Not Suspect: Pulmonary embolism    Decision Rules/Scores utilized: N/A     Tests considered but not ordered and why:  N/A     MIPS: N/A     Code Status Discussion:  Not Discussed    Additional Patient Education Provided: None     Medical Decision Making    Medical Decision Making  patient is a 89-year-old male with history of atrial fibrillation and CHF.  Patient presenting today with acute dyspnea and fluid overload.  Patient was noted to be in A-fib with RVR, had not been compliant with his metoprolol.  Patient describes not feeling well over the last couple days and did not take his metoprolol.  Patient also states that he self stopped his Eliquis due to  having a subconjunctival hematoma.  Patient has had some issues recently with compliance with medications according to daughter.  Patient continues to be short of breath, low O2 saturation.  Gave metoprolol and patient has a normal rate with improved blood pressure.  Patient's had improved dyspnea since rate control.  Patient potassium is slightly elevated at 5.4, sample was noted to be hemolyzed repeat potassium pending.  Due to low blood pressure, patient was not diuresed due to fluid overload at this time.  Awaiting rate control improvement of blood pressure.  Patient noted hospitalist group for further management of atrial fibrillation, rate control, and improvement of fluid overload      Problems Addressed:  Atrial fibrillation with rapid ventricular response: complicated acute illness or injury  Other hypervolemia: complicated acute illness or injury    Amount and/or Complexity of Data Reviewed  External Data Reviewed: labs, radiology and notes.  Labs: ordered.  Radiology: ordered and independent interpretation performed.     Details: Personally evaluated chest x-ray, patient noted to have extensive pulmonary edema bilaterally.  ECG/medicine tests: ordered.  Discussion of management or test interpretation with external provider(s): Hospitalist for admission    Risk  Prescription drug management.  Decision regarding hospitalization.        See ED COURSE for additional MDM statements    EKG    EKG Interpretation    Evaluated and interpreted by emergency department physician    Rhythm: Atrial fibrillation  Rate: 163  Axis: Right  Ectopy: PVC  Conduction: Right bundle branch block  ST Segments: nonspecific changes multiple leads  T Waves: Nonspecific  Q Waves: Lead III and V6 aVR V1    Clinical Impression: Atrial fibrillation with rapid ventricular rate with a right bundle branch block and right axis deviation.    Luis Bustamante, DO      All EKG's are interpreted by the Emergency Department Physician who  either signs or Co-signs this chart in the absence of a cardiologist.    Additional Scores                   EMERGENCY DEPARTMENT COURSE:         PROCEDURES:  None Performed   Procedures    DATA FOR LAB AND RADIOLOGY TESTS ORDERED BELOW ARE REVIEWED BY THE ED CLINICIAN:    RADIOLOGY: All x-rays, CT, MRI, and formal ultrasound images (except ED bedside ultrasound) are read by the radiologist, see reports below, unless otherwise noted in MDM or here.  Reports below are reviewed by myself.  XR Chest 1 View    (Results Pending)       LABS: Lab orders shown below, the results are reviewed by myself, and all abnormals are listed below.  Labs Reviewed   COMPREHENSIVE METABOLIC PANEL - Abnormal; Notable for the following components:       Result Value    Glucose 218 (*)     BUN 30 (*)     Creatinine 1.55 (*)     Potassium 5.4 (*)     CO2 18.9 (*)     Calcium 7.9 (*)     Alkaline Phosphatase 125 (*)     Total Bilirubin 1.8 (*)     Anion Gap 15.1 (*)     eGFR 43.1 (*)     All other components within normal limits    Narrative:     GFR Categories in Chronic Kidney Disease (CKD)      GFR Category          GFR (mL/min/1.73)    Interpretation  G1                     90 or greater         Normal or high (1)  G2                      60-89                Mild decrease (1)  G3a                   45-59                Mild to moderate decrease  G3b                   30-44                Moderate to severe decrease  G4                    15-29                Severe decrease  G5                    14 or less           Kidney failure          (1)In the absence of evidence of kidney disease, neither GFR category G1 or G2 fulfill the criteria for CKD.    eGFR calculation 2021 CKD-EPI creatinine equation, which does not include race as a factor   BNP (IN-HOUSE) - Abnormal; Notable for the following components:    proBNP 11,175.0 (*)     All other components within normal limits    Narrative:     This assay is used as an aid in the diagnosis  of individuals suspected of having heart failure. It can be used as an aid in the diagnosis of acute decompensated heart failure (ADHF) in patients presenting with signs and symptoms of ADHF to the emergency department (ED). In addition, NT-proBNP of <300 pg/mL indicates ADHF is not likely.    Age Range Result Interpretation  NT-proBNP Concentration (pg/mL:      <50             Positive            >450                   Gray                 300-450                    Negative             <300    50-75           Positive            >900                  Gray                300-900                  Negative            <300      >75             Positive            >1800                  Gray                300-1800                  Negative            <300   TROPONIN - Abnormal; Notable for the following components:    HS Troponin T 47 (*)     All other components within normal limits    Narrative:     High Sensitive Troponin T Reference Range:  <14.0 ng/L- Negative Female for AMI  <22.0 ng/L- Negative Male for AMI  >=14 - Abnormal Female indicating possible myocardial injury.  >=22 - Abnormal Male indicating possible myocardial injury.   Clinicians would have to utilize clinical acumen, EKG, Troponin, and serial changes to determine if it is an Acute Myocardial Infarction or myocardial injury due to an underlying chronic condition.        CBC WITH AUTO DIFFERENTIAL - Abnormal; Notable for the following components:    RDW 16.0 (*)     RDW-SD 56.2 (*)     Platelets 108 (*)     Lymphocyte % 12.2 (*)     Immature Grans % 1.0 (*)     Immature Grans, Absolute 0.08 (*)     All other components within normal limits   HIGH SENSITIVITIY TROPONIN T 1HR - Abnormal; Notable for the following components:    HS Troponin T 47 (*)     All other components within normal limits    Narrative:     High Sensitive Troponin T Reference Range:  <14.0 ng/L- Negative Female for AMI  <22.0 ng/L- Negative Male for AMI  >=14 - Abnormal Female  indicating possible myocardial injury.  >=22 - Abnormal Male indicating possible myocardial injury.   Clinicians would have to utilize clinical acumen, EKG, Troponin, and serial changes to determine if it is an Acute Myocardial Infarction or myocardial injury due to an underlying chronic condition.        COVID-19 AND FLU A/B, NP SWAB IN TRANSPORT MEDIA 1 HR TAT - Normal    Narrative:     Fact sheet for providers: https://www.fda.gov/media/981737/download    Fact sheet for patients: https://www.fda.gov/media/048143/download    Test performed by PCR.   COVID PRE-OP / PRE-PROCEDURE SCREENING ORDER (NO ISOLATION)    Narrative:     The following orders were created for panel order COVID PRE-OP / PRE-PROCEDURE SCREENING ORDER (NO ISOLATION) - Swab, Nasopharynx.  Procedure                               Abnormality         Status                     ---------                               -----------         ------                     COVID-19 and FLU A/B PCR...[[id number]]  Normal              Final result                 Please view results for these tests on the individual orders.   RAINBOW DRAW    Narrative:     The following orders were created for panel order [place] Draw.  Procedure                               Abnormality         Status                     ---------                               -----------         ------                     Green Top (Gel)[[id number]]                                  Final result               Lavender Top[[id number]]                                     Final result               Gold Top - SST[[id number]]                                   Final result               Light Blue Top[[id number]]                                   Final result                 Please view results for these tests on the individual orders.   POCT GLUCOSE FINGERSTICK   POCT GLUCOSE FINGERSTICK   POCT GLUCOSE FINGERSTICK   POCT GLUCOSE FINGERSTICK   POCT GLUCOSE FINGERSTICK   CBC AND DIFFERENTIAL    Narrative:      The following orders were created for panel order CBC & Differential.  Procedure                               Abnormality         Status                     ---------                               -----------         ------                     CBC Auto Differential[[id number]]        Abnormal            Final result               Scan Slide[[id number]]                                                                    Please view results for these tests on the individual orders.   GREEN TOP   LAVENDER TOP   GOLD TOP - SST   LIGHT BLUE TOP       Vitals Reviewed:    Vitals:    02/03/25 0545 02/03/25 0600 02/03/25 0615 02/03/25 0630   BP: 97/66 101/66 100/68 101/73   BP Location:       Patient Position:       Pulse: 90 92 91 91   Resp:       Temp:       TempSrc:       SpO2: 98% 96% 99% 100%       MEDICATIONS GIVEN TO PATIENT THIS ENCOUNTER:  Medications   sodium chloride 0.9 % flush 10 mL (has no administration in time range)   allopurinol (ZYLOPRIM) tablet 100 mg (has no administration in time range)   amiodarone (PACERONE) tablet 200 mg (has no administration in time range)   apixaban (ELIQUIS) tablet 2.5 mg (has no administration in time range)   ascorbic acid (VITAMIN C) tablet 1,000 mg (has no administration in time range)   empagliflozin (JARDIANCE) tablet 25 mg (has no administration in time range)   metoprolol succinate XL (TOPROL-XL) 24 hr tablet 25 mg (has no administration in time range)   pravastatin (PRAVACHOL) tablet 40 mg (has no administration in time range)   nitroglycerin (NITROSTAT) SL tablet 0.4 mg (has no administration in time range)   sodium chloride 0.9 % flush 10 mL (has no administration in time range)   sodium chloride 0.9 % flush 10 mL (has no administration in time range)   sodium chloride 0.9 % infusion 40 mL (has no administration in time range)   acetaminophen (TYLENOL) tablet 650 mg (has no administration in time range)   calcium carbonate (TUMS) chewable tablet 500 mg (200 mg  elemental) (has no administration in time range)   sennosides-docusate (PERICOLACE) 8.6-50 MG per tablet 2 tablet (has no administration in time range)     And   polyethylene glycol (MIRALAX) packet 17 g (has no administration in time range)     And   bisacodyl (DULCOLAX) EC tablet 5 mg (has no administration in time range)     And   bisacodyl (DULCOLAX) suppository 10 mg (has no administration in time range)   ondansetron ODT (ZOFRAN-ODT) disintegrating tablet 4 mg (has no administration in time range)     Or   ondansetron (ZOFRAN) injection 4 mg (has no administration in time range)   furosemide (LASIX) tablet 40 mg (has no administration in time range)   insulin glargine (LANTUS, SEMGLEE) injection 1-200 Units (has no administration in time range)   insulin lispro (humaLOG) injection 1-200 Units (has no administration in time range)   insulin lispro (humaLOG) injection 1-200 Units (has no administration in time range)   dextrose (GLUTOSE) oral gel 15 g (has no administration in time range)   dextrose (D50W) (25 g/50 mL) IV injection 10-50 mL (has no administration in time range)   Glucagon (GLUCAGEN) injection 1 mg (has no administration in time range)   Potassium Replacement - Follow Nurse / BPA Driven Protocol (has no administration in time range)   Magnesium Standard Dose Replacement - Follow Nurse / BPA Driven Protocol (has no administration in time range)   Phosphorus Replacement - Follow Nurse / BPA Driven Protocol (has no administration in time range)   Calcium Replacement - Follow Nurse / BPA Driven Protocol (has no administration in time range)   metoprolol tartrate (LOPRESSOR) injection 5 mg (5 mg Intravenous Given 2/3/25 8191)       CONSULTS:  IP CONSULT TO CARDIOLOGY  IP CONSULT TO PALLIATIVE CARE TEAM    CRITICAL CARE:  There was significant risk of life threatening deterioration of patient's condition requiring my direct management. Critical care time 35 minutes, excluding any documented  "procedures.    FINAL IMPRESSION      1. Atrial fibrillation with rapid ventricular response    2. Other hypervolemia          DISPOSITION / PLAN     ED Disposition       ED Disposition   Decision to Admit    Condition   --    Comment   Level of Care: Telemetry [5]   Diagnosis: Atrial fibrillation with rapid ventricular response [[id number]]   Admitting Physician: ANGELO HAIRSTON [[id number]]   Certification: I Certify That Inpatient Hospital Services Are Medically Necessary For Greater Than 2 Midnights                 PATIENT REFERRED TO:  No follow-up provider specified.    DISCHARGE MEDICATIONS:     Medication List        CONTINUE taking these medications      Droplet Insulin Syringe 31G X 5/16\" 0.5 ML misc  Generic drug: Insulin Syringe-Needle U-100            ASK your doctor about these medications      allopurinol 100 MG tablet  Commonly known as: ZYLOPRIM  TAKE 1 TABLET BY MOUTH DAILY     amiodarone 200 MG tablet  Commonly known as: PACERONE     apixaban 2.5 MG tablet tablet  Commonly known as: ELIQUIS  Take 1 tablet by mouth Every 12 (Twelve) Hours for 30 days. Indications: Atrial Fibrillation     ascorbic acid 1000 MG tablet  Commonly known as: VITAMIN C     Calcium Carb-Cholecalciferol 600-5 MG-MCG tablet  Take 1 tablet by mouth Every Other Day. Indications: Low Amount of Calcium in the Blood     ciclopirox 1 % shampoo  Commonly known as: LOPROX     CINNAMON PO     Entresto 24-26 MG tablet  Generic drug: sacubitril-valsartan  TAKE ONE TABLET BY MOUTH EVERY 12 HOURS     fluocinonide 0.05 % external solution  Commonly known as: LIDEX     fluticasone 50 MCG/ACT nasal spray  Commonly known as: Flonase  2 sprays into the nostril(s) as directed by provider Daily. 2 puffs each nostril     furosemide 40 MG tablet  Commonly known as: LASIX  Take 1 tablet by mouth Daily. Take twice a day until swelling improves and then take once daily after that     Jardiance 25 MG tablet tablet  Generic drug: empagliflozin  TAKE 1 " TABLET BY MOUTH DAILY     ketoconazole 2 % shampoo  Commonly known as: NIZORAL     Lantus 100 UNIT/ML injection  Generic drug: insulin glargine  Inject up to 50 units daily subcutaneously     metoprolol succinate XL 25 MG 24 hr tablet  Commonly known as: TOPROL-XL  Take 1 tablet by mouth Daily.     multivitamin with minerals tablet tablet     potassium chloride 10 MEQ CR tablet  Take 2 tablets by mouth Daily for 21 days.     pravastatin 40 MG tablet  Commonly known as: PRAVACHOL  TAKE ONE TABLET BY MOUTH EVERY NIGHT AT BEDTIME     PREVAGEN PO     triamcinolone 0.1 % cream  Commonly known as: KENALOG     True Metrix Blood Glucose Test test strip  Generic drug: glucose blood  Use to test blood sugar 3 times daily.  Dx E11.65     Trulicity 3 MG/0.5ML solution pen-injector  Generic drug: Dulaglutide  Inject 0.5 mL under the skin into the appropriate area as directed 1 (One) Time Per Week.     VITAMIN D (CHOLECALCIFEROL) PO     Xtandi 40 MG tablet tablet  Generic drug: enzalutamide  Take 3 tablets by mouth Daily.              Electronically signed by Luis Bustamante DO, 02/03/25, 4:07 AM EST.    Emergency Medicine Physician  [facility]  (Please note that portions of thisnote were completed with a voice recognition program.  Efforts were made to edit the dictations but occasionally words are mis-transcribed.)       Luis Bustamante DO  02/03/25 0719

## 2025-02-04 ENCOUNTER — TELEPHONE (OUTPATIENT)
Dept: ONCOLOGY | Facility: CLINIC | Age: 89
End: 2025-02-04
Payer: MEDICARE

## 2025-02-04 LAB
ALBUMIN SERPL-MCNC: 4 G/DL (ref 3.5–5.2)
ALBUMIN/GLOB SERPL: 1.6 G/DL
ALP SERPL-CCNC: 132 U/L (ref 39–117)
ALT SERPL W P-5'-P-CCNC: 19 U/L (ref 1–41)
ANION GAP SERPL CALCULATED.3IONS-SCNC: 12.8 MMOL/L (ref 5–15)
AST SERPL-CCNC: 38 U/L (ref 1–40)
BILIRUB SERPL-MCNC: 1.5 MG/DL (ref 0–1.2)
BUN SERPL-MCNC: 40 MG/DL (ref 8–23)
BUN/CREAT SERPL: 19.5 (ref 7–25)
CALCIUM SPEC-SCNC: 8 MG/DL (ref 8.6–10.5)
CHLORIDE SERPL-SCNC: 104 MMOL/L (ref 98–107)
CO2 SERPL-SCNC: 22.2 MMOL/L (ref 22–29)
CREAT SERPL-MCNC: 2.05 MG/DL (ref 0.76–1.27)
DEPRECATED RDW RBC AUTO: 55.7 FL (ref 37–54)
EGFRCR SERPLBLD CKD-EPI 2021: 30.8 ML/MIN/1.73
ERYTHROCYTE [DISTWIDTH] IN BLOOD BY AUTOMATED COUNT: 15.7 % (ref 12.3–15.4)
GLOBULIN UR ELPH-MCNC: 2.5 GM/DL
GLUCOSE BLDC GLUCOMTR-MCNC: 106 MG/DL (ref 70–130)
GLUCOSE BLDC GLUCOMTR-MCNC: 119 MG/DL (ref 70–130)
GLUCOSE BLDC GLUCOMTR-MCNC: 120 MG/DL (ref 70–130)
GLUCOSE BLDC GLUCOMTR-MCNC: 91 MG/DL (ref 70–130)
GLUCOSE SERPL-MCNC: 110 MG/DL (ref 65–99)
HCT VFR BLD AUTO: 42.8 % (ref 37.5–51)
HGB BLD-MCNC: 13.4 G/DL (ref 13–17.7)
MCH RBC QN AUTO: 29.8 PG (ref 26.6–33)
MCHC RBC AUTO-ENTMCNC: 31.3 G/DL (ref 31.5–35.7)
MCV RBC AUTO: 95.3 FL (ref 79–97)
PLATELET # BLD AUTO: 118 10*3/MM3 (ref 140–450)
PMV BLD AUTO: 11.1 FL (ref 6–12)
POTASSIUM SERPL-SCNC: 5.3 MMOL/L (ref 3.5–5.2)
PROT SERPL-MCNC: 6.5 G/DL (ref 6–8.5)
RBC # BLD AUTO: 4.49 10*6/MM3 (ref 4.14–5.8)
SODIUM SERPL-SCNC: 139 MMOL/L (ref 136–145)
WBC NRBC COR # BLD AUTO: 7.76 10*3/MM3 (ref 3.4–10.8)

## 2025-02-04 PROCEDURE — 85027 COMPLETE CBC AUTOMATED: CPT | Performed by: INTERNAL MEDICINE

## 2025-02-04 PROCEDURE — 99231 SBSQ HOSP IP/OBS SF/LOW 25: CPT | Performed by: PHYSICIAN ASSISTANT

## 2025-02-04 PROCEDURE — 94660 CPAP INITIATION&MGMT: CPT

## 2025-02-04 PROCEDURE — 80053 COMPREHEN METABOLIC PANEL: CPT | Performed by: INTERNAL MEDICINE

## 2025-02-04 PROCEDURE — 63710000001 INSULIN LISPRO (HUMAN) PER 5 UNITS: Performed by: INTERNAL MEDICINE

## 2025-02-04 PROCEDURE — 94799 UNLISTED PULMONARY SVC/PX: CPT

## 2025-02-04 PROCEDURE — 99232 SBSQ HOSP IP/OBS MODERATE 35: CPT | Performed by: NURSE PRACTITIONER

## 2025-02-04 PROCEDURE — 97165 OT EVAL LOW COMPLEX 30 MIN: CPT

## 2025-02-04 PROCEDURE — 97161 PT EVAL LOW COMPLEX 20 MIN: CPT

## 2025-02-04 PROCEDURE — 63710000001 INSULIN GLARGINE PER 5 UNITS: Performed by: INTERNAL MEDICINE

## 2025-02-04 PROCEDURE — 82948 REAGENT STRIP/BLOOD GLUCOSE: CPT

## 2025-02-04 RX ADMIN — APIXABAN 2.5 MG: 2.5 TABLET, FILM COATED ORAL at 21:05

## 2025-02-04 RX ADMIN — ALLOPURINOL 100 MG: 100 TABLET ORAL at 08:25

## 2025-02-04 RX ADMIN — INSULIN LISPRO 2 UNITS: 100 INJECTION, SOLUTION INTRAVENOUS; SUBCUTANEOUS at 12:00

## 2025-02-04 RX ADMIN — INSULIN LISPRO 4 UNITS: 100 INJECTION, SOLUTION INTRAVENOUS; SUBCUTANEOUS at 08:26

## 2025-02-04 RX ADMIN — AMIODARONE HYDROCHLORIDE 200 MG: 200 TABLET ORAL at 08:25

## 2025-02-04 RX ADMIN — EMPAGLIFLOZIN 25 MG: 25 TABLET, FILM COATED ORAL at 08:25

## 2025-02-04 RX ADMIN — OXYCODONE HYDROCHLORIDE AND ACETAMINOPHEN 1000 MG: 500 TABLET ORAL at 08:24

## 2025-02-04 RX ADMIN — Medication 10 ML: at 08:26

## 2025-02-04 RX ADMIN — METOPROLOL SUCCINATE 25 MG: 25 TABLET, EXTENDED RELEASE ORAL at 08:26

## 2025-02-04 RX ADMIN — Medication 10 ML: at 21:06

## 2025-02-04 RX ADMIN — INSULIN LISPRO 2 UNITS: 100 INJECTION, SOLUTION INTRAVENOUS; SUBCUTANEOUS at 17:18

## 2025-02-04 RX ADMIN — PRAVASTATIN SODIUM 40 MG: 20 TABLET ORAL at 08:25

## 2025-02-04 RX ADMIN — INSULIN GLARGINE 12 UNITS: 100 INJECTION, SOLUTION SUBCUTANEOUS at 21:05

## 2025-02-04 RX ADMIN — FUROSEMIDE 40 MG: 40 TABLET ORAL at 08:25

## 2025-02-04 RX ADMIN — APIXABAN 2.5 MG: 2.5 TABLET, FILM COATED ORAL at 08:25

## 2025-02-04 NOTE — TELEPHONE ENCOUNTER
----- Message from David MCCAIN sent at 2/4/2025  1:53 PM EST -----  Patient is in the hospital at [facility] on telly

## 2025-02-04 NOTE — PLAN OF CARE
Goal Outcome Evaluation:      Palliative Care Team --Jimena VILLA and Marie Finch RN visited pt this am.  Pt sitting on side of bed with O2 per NC At 3 L .  Pt reported not sleeping well last night for different reasons one of which was the CPAP mask which is different from the one he has at home.     Jimena assessed pt and informed him the swelling in his legs seemed to be reduced.  He agreed.  Documentation reveals pt ate well at breakfast (100%) and BM today 2/4/25.  Pt verbalized hoping to be discharged today.      Jimena had long discussion with pt re: compliance with taking his medications.   He admitted there were times he felt too tired to take his meds or he's forget.  They discusses various ways to remind him to take his medications.   She informed him if he doesn't take his medications the way they are ordered, he will eventually be back in the hospital.  He agreed.    PC will continue to follow while pt in hospital

## 2025-02-04 NOTE — PROGRESS NOTES
"    [facility]     PALLIATIVE CARE FOLLOW UP NOTE    Name:  Blane Dietz   Age:  89 y.o.  Sex:  male  :  1935  MRN:  [id number]   Visit Number:  [id number]  Date Of Service:  25  Primary Care Physician:  David Em MD    Chief Complaint: Generalized weakness    Interval History:  Patient seen today during palliative care team rounds.  Record reviewed and case discussed with staff, MARY.  Upon assessment, patient is interactive with staff.   Had been up to the [facility] with nursing.   Per chart review he has had excellent intake, 100% at breakfast.  BM noted 2/3.  He denies any pain, dyspnea symptoms are improving.  He feels that he has had interval improvement to his lower extremity edema.  He did not sleep well last night, hopeful to discharge home today.  He is currently on 3L nc.  We discussed ways to improve medication compliance, which he desires to work on.        Review of Systems   Constitutional:  Positive for activity change.   Respiratory:  Positive for shortness of breath.    Cardiovascular:  Positive for leg swelling.   Neurological:  Positive for weakness.             Vitals: /66 (BP Location: Left arm, Patient Position: Sitting)   Pulse 90   Temp 98.2 °F (36.8 °C) (Oral)   Resp 18   Ht 167.6 cm (66\")   Wt 90.2 kg (198 lb 13.7 oz)   SpO2 97%   BMI 32.10 kg/m²     Physical Exam  Vitals and nursing note reviewed.   Constitutional:       General: He is not in acute distress.     Appearance: He is obese. He is not diaphoretic.   HENT:      Head: Normocephalic and atraumatic.      Mouth/Throat:      Mouth: Mucous membranes are moist.      Pharynx: Oropharynx is clear.   Eyes:      Pupils: Pupils are equal, round, and reactive to light.   Cardiovascular:      Comments: Appears well perfused   Pulmonary:      Effort: Pulmonary effort is normal. No respiratory distress.   Abdominal:      Palpations: Abdomen is soft.   Musculoskeletal:         General: Swelling " present. Normal range of motion.      Cervical back: Neck supple.   Skin:     General: Skin is warm and dry.      Capillary Refill: Capillary refill takes less than 2 seconds.   Neurological:      Mental Status: He is alert and oriented to person, place, and time.   Psychiatric:         Mood and Affect: Mood normal.         Behavior: Behavior normal.          Results Reviewed:    Intake/Output Summary (Last 24 hours) at 2/4/2025 0835  Last data filed at 2/4/2025 0808  Gross per 24 hour   Intake 260 ml   Output 200 ml   Net 60 ml     Results from last 7 days   Lab Units 02/04/25  0553   SODIUM mmol/L 139   POTASSIUM mmol/L 5.3*   CHLORIDE mmol/L 104   CO2 mmol/L 22.2   BUN mg/dL 40*   CREATININE mg/dL 2.05*   CALCIUM mg/dL 8.0*   BILIRUBIN mg/dL 1.5*   ALK PHOS U/L 132*   ALT (SGPT) U/L 19   AST (SGOT) U/L 38   GLUCOSE mg/dL 110*     Results from last 7 days   Lab Units 02/04/25  0553   WBC 10*3/mm3 7.76   HEMOGLOBIN g/dL 13.4   HEMATOCRIT % 42.8   PLATELETS 10*3/mm3 118*       Medication Review:   I have reviewed the patients active and prn medications.     Palliative Care Assessment:  HFrEF with acute exacerbation  Atrial flutter with RVR  Valvular heart disease  History of prostate cancer  History of B cell lymphoma  CKD stg III  T2DM  Impaired mobility and ADLs    Recommendations/Plan:  - GOC discussions yield patient with desire to pursue plan in place with intent for recovery  - Discussed CODE STATUS in detail, he desires to maintain FULL CODE for now, however shares this is temporary and will likely reinstate DNR status following completion of taxes   - Likely dc back to [facility] once medically stable, CM following  - PT/OT consulted, he is ambulatory with use of a walker  - Continued to encourage medication compliance   - Patient is scheduled to see Dr. Toussaint, Thursday as outpatient   - Palliative care will continue to follow/support patient and family    CODE STATUS:   Code Status and Medical Interventions:  CPR (Attempt to Resuscitate); Full Support   Ordered at: 02/03/25 0647     Code Status (Patient has no pulse and is not breathing):    CPR (Attempt to Resuscitate)     Medical Interventions (Patient has pulse or is breathing):    Full Support         I spent 25 total minutes, including face to face assessment, record review, coordination of care with staff, and counseling patient and/or family  Part of this note may be an electronic transcription/translation of spoken language to printed text using the Dragon Dictation System.    Jimena Silvestre PA-C  02/04/25  08:35 EST

## 2025-02-04 NOTE — PLAN OF CARE
Goal Outcome Evaluation:  Plan of Care Reviewed With: patient        Progress: no change  Outcome Evaluation: Pt seen for OT evaluation today.  Pt lives at [facility] where he is able to bathe and dress himself and uses a walker to walk around his room.  Pt received sitting eob today on 3L O2 and was able to stand with cga and walked 60' with cga using RW.  Pt is expected to benefit from skilled OT to improve his strength and independence with ADL tasks.  Pt would benefit from home health therapy services upon d/c from the hospital.    Anticipated Discharge Disposition (OT): assisted living, home with home health

## 2025-02-04 NOTE — THERAPY EVALUATION
Patient Name: Blane Dietz  : 1935    MRN: [id number]                              Today's Date: 2025       Admit Date: 2/3/2025    Visit Dx:     ICD-10-CM ICD-9-CM   1. Atrial fibrillation with rapid ventricular response  I48.91 427.31   2. Other hypervolemia  E87.79 276.69     Patient Active Problem List   Diagnosis    Essential hypertension    Type 2 diabetes mellitus with hyperglycemia, with long-term current use of insulin    Prostate cancer    Aortic stenosis    Sleep apnea    S/P AVR    HLD (hyperlipidemia)    Renal mass    s/p right nepheroureterectomy and adrenalectomy     Diffuse large B-cell lymphoma of solid organ excluding spleen    PICC (peripherally inserted central catheter) flush    History of pulmonary embolism    History of malignant neoplasm of prostate    Lymphoma of kidney    Stage 3b chronic kidney disease    Pneumonia due to COVID-19 virus    Abrasion    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)    Valvular heart disease    Atrial arrhythmia    RBBB    Atrial fibrillation with rapid ventricular response     Past Medical History:   Diagnosis Date    Aortic stenosis     status post ROSS procedure, remote, with 2015 echocardiography revealing normal aortic and pulmonary valve function, normal ejection fraction and mild to moderate MR    Arthritis     Bilateral impacted cerumen 2016    Bronchitis     CHF (congestive heart failure)     Chronic gout of right foot 2016    Impression: 2016 - stable.;     COVID-19 vaccine series completed 2021    Maderna 2nd dose 3/12/21.    Depression     Diabetes mellitus     Diverticulitis     Exogenous obesity     Gout     Heart murmur     History of vitamin D deficiency     Hypercholesteremia 2016    Hyperlipidemia     Hypertension     Impaired mobility     Kidney lesion     Malignant neoplasm of prostate     Morbid obesity 2016    Pneumonia 2021    Primary osteoarthritis  involving multiple joints 06/13/2016    Impression: 03/08/2016 - stable;     Pulmonary embolism     Sleep apnea 05/12/2021    C-Pap    Uncontrolled type 2 diabetes mellitus with hyperglycemia 06/13/2016    Impression: 03/08/2016 - seeing Endo .;     Vertigo      Past Surgical History:   Procedure Laterality Date    CARDIAC VALVE REPLACEMENT  05/12/2021    Ross procedure 22 years ago    COLON SURGERY      COLONOSCOPY      COLOSTOMY  1999    COLOSTOMY REVISION      CYSTOSCOPY N/A 12/7/2021    Procedure: CYSTOSCOPY FLEXIBLE;  Surgeon: José Miguel Villafuerte Jr., MD;  Location:  [facility];  Service: Urology;  Laterality: N/A;    EXPLORATORY LAPAROTOMY      With Tissue Removal    LIPOMA EXCISION      MOHS SURGERY      NEPHROURETERECTOMY Right 12/7/2021    Procedure: RIGHT NEPHROURETERECTOMY LAPAROSCOPIC WITH DAVINCI ROBOT;  Surgeon: José Miguel Villafuerte Jr., MD;  Location:  [facility];  Service: Robotics - DaVinci;  Laterality: Right;    OTHER SURGICAL HISTORY      Porcine Valve    PROSTATE SURGERY      PROSTATECTOMY  2000     History of Prostatectomy Perineal Radical    SKIN CANCER EXCISION      from head and lip    TONSILLECTOMY        General Information       Row Name 02/04/25 1351          OT Time and Intention    Document Type evaluation  -     Mode of Treatment occupational therapy  -     Patient Effort good  -       Row Name 02/04/25 1358          General Information    Patient Profile Reviewed yes  -     Prior Level of Function independent:;ADL's;all household mobility  -     Existing Precautions/Restrictions fall;oxygen therapy device and L/min  -     Barriers to Rehab medically complex  -       Row Name 02/04/25 1352          Occupational Profile    Reason for Services/Referral (Occupational Profile) ADL decline  -       Row Name 02/04/25 1352          Living Environment    People in Home facility resident  pt lives at [facility]  -       Row Name 02/04/25 1352          Home Main Entrance     Number of Stairs, Main Entrance none  -[facility] Name 02/04/25 1355          Stairs Within Home, Primary    Number of Stairs, Within Home, Primary none  -[facility] Name 02/04/25 1355          Cognition    Orientation Status (Cognition) oriented x 4  -AH       Row Name 02/04/25 1355          Safety Issues/Impairments Affecting Functional Mobility    Safety Issues Affecting Function (Mobility) safety precaution awareness;safety precautions follow-through/compliance  -     Impairments Affecting Function (Mobility) endurance/activity tolerance;shortness of breath  -               User Key  (r) = Recorded By, (t) = Taken By, (c) = Cosigned By      Initials Name Provider Type     Dary Ku Occupational Therapist                     Mobility/ADL's       [facility] Name 02/04/25 1359          Bed Mobility    Comment, (Bed Mobility) pt sitting eob upon arrival  -[facility] Name 02/04/25 1359          Transfers    Transfers sit-stand transfer  -AH       Row Name 02/04/25 [facility]9          Sit-Stand Transfer    Sit-Stand [place] (Transfers) contact guard;verbal cues  -     Assistive Device (Sit-Stand Transfers) walker, front-wheeled  -AH       Row Name 02/04/25 [facility]9          Functional Mobility    Functional Mobility- Ind. Level verbal cues required;contact guard assist  -     Functional Mobility- Device walker, front-wheeled  -     Functional Mobility-Distance (Feet) 60  -     Functional Mobility- Safety Issues supplemental O2  -     Patient was able to Ambulate yes  -AH       Row Name 02/04/25 [email or web address]          Activities of Daily Living    BADL Assessment/Intervention bathing;upper body dressing;lower body dressing;grooming;feeding;toileting  -AH       Row Name 02/04/25 1359          Bathing Assessment/Intervention    [place] Level (Bathing) minimum assist (75% patient effort)  -AH       Row Name 02/04/25 [facility]9          Upper Body Dressing Assessment/Training    [place] Level (Upper Body  Dressing) set up  -[facility] Name 02/04/25 1359          Lower Body Dressing Assessment/Training    [place] Level (Lower Body Dressing) minimum assist (75% patient effort)  -[facility] Name 02/04/25 1359          Grooming Assessment/Training    [place] Level (Grooming) set up  -[facility] Name 02/04/25 1359          Self-Feeding Assessment/Training    [place] Level (Feeding) independent  -[facility] Name 02/04/25 1359          Toileting Assessment/Training    [place] Level (Toileting) minimum assist (75% patient effort)  -               User Key  (r) = Recorded By, (t) = Taken By, (c) = Cosigned By      Initials Name Provider Type    Dary Lee Occupational Therapist                   Obj/Interventions       [facility] Name 02/04/25 1401          Sensory Assessment (Somatosensory)    Sensory Assessment (Somatosensory) sensation intact  -AH       Row Name 02/04/25 1401          Vision Assessment/Intervention    Visual Impairment/Limitations WFL  -AH       Row Name 02/04/25 1401          Range of Motion Comprehensive    General Range of Motion bilateral upper extremity ROM WFL  -AH       Row Name 02/04/25 1401          Strength Comprehensive (MMT)    Comment, General Manual Muscle Testing (MMT) Assessment [facility]               User Key  (r) = Recorded By, (t) = Taken By, (c) = Cosigned By      Initials Name Provider Type    Dary Lee Occupational Therapist                   Goals/Plan       Row Name 02/04/25 1409          Bed Mobility Goal 1 (OT)    Activity/Assistive Device (Bed Mobility Goal 1, OT) bed mobility activities, all  -     [place] Level/Cues Needed (Bed Mobility Goal 1, OT) modified independence  -     Time Frame (Bed Mobility Goal 1, OT) by discharge  [facility]     Progress/Outcomes (Bed Mobility Goal 1, OT) goal ongoing  -AH       Row Name 02/04/25 1409          Transfer Goal 1 (OT)    Activity/Assistive Device (Transfer Goal 1, OT)  sit-to-stand/stand-to-sit;walker, rolling  -     [place] Level/Cues Needed (Transfer Goal 1, OT) standby assist  -     Time Frame (Transfer Goal 1, OT) by discharge  -     Progress/Outcome (Transfer Goal 1, OT) goal ongoing  -       Row Name 02/04/25 1409          Bathing Goal 1 (OT)    Activity/Device (Bathing Goal 1, OT) bathing skills, all  -     [place] Level/Cues Needed (Bathing Goal 1, OT) set-up required  -     Time Frame (Bathing Goal 1, OT) long term goal (LTG);1 week  -     Progress/Outcomes (Bathing Goal 1, OT) goal ongoing  -       Row Name 02/04/25 1409          Dressing Goal 1 (OT)    Activity/Device (Dressing Goal 1, OT) lower body dressing  -     [place]/Cues Needed (Dressing Goal 1, OT) set-up required  -     Time Frame (Dressing Goal 1, OT) long term goal (LTG);1 week  -     Progress/Outcome (Dressing Goal 1, OT) goal ongoing  -[facility] Name 02/04/25 1409          Toileting Goal 1 (OT)    Activity/Device (Toileting Goal 1, OT) adjust/manage clothing;perform perineal hygiene  -     [place] Level/Cues Needed (Toileting Goal 1, OT) supervision required  -     Time Frame (Toileting Goal 1, OT) by discharge  -     Progress/Outcome (Toileting Goal 1, OT) goal ongoing  -[facility] Name 02/04/25 1409          Strength Goal 1 (OT)    Strength Goal 1 (OT) Pt will perform UB strengthening ex using theraband for resistance.  -     Time Frame (Strength Goal 1, OT) by discharge  -     Progress/Outcome (Strength Goal 1, OT) goal ongoing  -[facility] Name 02/04/25 1409          Therapy Assessment/Plan (OT)    Planned Therapy Interventions (OT) activity tolerance training;BADL retraining;patient/caregiver education/training;transfer/mobility retraining;strengthening exercise  -               User Key  (r) = Recorded By, (t) = Taken By, (c) = Cosigned By      Initials Name Provider Type    Dary Lee Occupational Therapist                    Clinical Impression       Row Name 02/04/25 1401          Pain Assessment    Pretreatment Pain Rating 0/10 - no pain  -     Posttreatment Pain Rating 0/10 - no pain  -       Row Name 02/04/25 1401          Plan of Care Review    Plan of Care Reviewed With patient  -     Progress no change  -     Outcome Evaluation Pt seen for OT evaluation today.  Pt lives at [facility] where he is able to bathe and dress himself and uses a walker to walk around his room.  Pt received sitting eob today on 3L O2 and was able to stand with cga and walked 60' with cga using RW.  Pt is expected to benefit from skilled OT to improve his strength and independence with ADL tasks.  Pt would benefit from home health therapy services upon d/c from the hospital.  -       Row Name 02/04/25 1401          Therapy Assessment/Plan (OT)    Patient/Family Therapy Goal Statement (OT) d/c home with home health  [facility]     Rehab Potential (OT) good  -AH     Criteria for Skilled Therapeutic Interventions Met (OT) yes;skilled treatment is necessary  -     Therapy Frequency (OT) 3 times/wk  -       Row Name 02/04/25 1401          Therapy Plan Review/Discharge Plan (OT)    Anticipated Discharge Disposition (OT) assisted living;home with home health  -       Row Name 02/04/25 1401          Vital Signs    Pre SpO2 (%) 98  -     O2 Delivery Pre Treatment supplemental O2  3L  -     O2 Delivery Intra Treatment supplemental O2  -     Post SpO2 (%) 100  -AH     O2 Delivery Post Treatment supplemental O2  -       Row Name 02/04/25 1401          Positioning and Restraints    Pre-Treatment Position in bed  -     Post Treatment Position chair  -     In Chair sitting;call light within reach;encouraged to call for assist  -               User Key  (r) = Recorded By, (t) = Taken By, (c) = Cosigned By      Initials Name Provider Type    Dary Lee Occupational Therapist                   Outcome Measures       Row Name 02/04/25 5543           How much help from another is currently needed...    Putting on and taking off regular lower body clothing? 3  -AH     Bathing (including washing, rinsing, and drying) 3  -AH     Toileting (which includes using toilet bed pan or urinal) 3  -AH     Putting on and taking off regular upper body clothing 4  -AH     Taking care of personal grooming (such as brushing teeth) 3  -AH     Eating meals 4  -     AM-PAC 6 Clicks Score (OT) 20  -AH       Row Name 02/04/25 1352 02/04/25 0800       How much help from another person do you currently need...    Turning from your back to your side while in flat bed without using bedrails? 4 (P)   -JH 3  -KJ    Moving from lying on back to sitting on the side of a flat bed without bedrails? 4 (P)   -JH 3  -KJ    Moving to and from a bed to a chair (including a wheelchair)? 4 (P)   -JH 3  -KJ    Standing up from a chair using your arms (e.g., wheelchair, bedside chair)? 4 (P)   -JH 3  -KJ    Climbing 3-5 steps with a railing? 3 (P)   -JH 2  -KJ    To walk in hospital room? 4 (P)   -JH 3  -KJ    AM-PAC 6 Clicks Score (PT) 23 (P)   -JH 17  -KJ    Highest Level of Mobility Goal 7 --> Walk 25 feet or more (P)   -JH 5 --> Static standing  -KJ      Row Name 02/04/25 1410 02/04/25 1352       Functional Assessment    Outcome Measure [facility] AM-PAC 6 Clicks Daily Activity (OT)  - AM-PAC 6 Clicks Basic Mobility (PT) (P)   -JH              User Key  (r) = Recorded By, (t) = Taken By, (c) = Cosigned By      Initials Name Provider Type    Dary Lee Occupational Therapist    Frances Noble, RN Registered Nurse     HeadFarhad, PT Student PT Student                    Occupational Therapy Education       Title: PT OT SLP Therapies (In Progress)       Topic: Occupational Therapy (In Progress)       Point: ADL training (Done)       Description:   Instruct learner(s) on proper safety adaptation and remediation techniques during self care or transfers.   Instruct in proper use of  assistive devices.                  Learning Progress Summary            Patient Acceptance, E,TB, VU by  at 2/4/2025 8310    Comment: Role of OT/POC                      Point: Home exercise program (Not Started)       Description:   Instruct learner(s) on appropriate technique for monitoring, assisting and/or progressing therapeutic exercises/activities.                  Learner Progress:  Not documented in this visit.              Point: Precautions (Not Started)       Description:   Instruct learner(s) on prescribed precautions during self-care and functional transfers.                  Learner Progress:  Not documented in this visit.              Point: Body mechanics (Not Started)       Description:   Instruct learner(s) on proper positioning and spine alignment during self-care, functional mobility activities and/or exercises.                  Learner Progress:  Not documented in this visit.                              User Key       Initials Effective Dates Name Provider Type Discipline     06/16/21 -  Dary Ku Occupational Therapist OT                  OT Recommendation and Plan  Planned Therapy Interventions (OT): activity tolerance training, BADL retraining, patient/caregiver education/training, transfer/mobility retraining, strengthening exercise  Therapy Frequency (OT): 3 times/wk  Plan of Care Review  Plan of Care Reviewed With: patient  Progress: no change  Outcome Evaluation: Pt seen for OT evaluation today.  Pt lives at [facility] where he is able to bathe and dress himself and uses a walker to walk around his room.  Pt received sitting eob today on 3L O2 and was able to stand with cga and walked 60' with cga using RW.  Pt is expected to benefit from skilled OT to improve his strength and independence with ADL tasks.  Pt would benefit from home health therapy services upon d/c from the hospital.     Time Calculation:   Evaluation Complexity (OT)  Review Occupational  Profile/Medical/Therapy History Complexity: brief/low complexity  Assessment, Occupational Performance/Identification of Deficit Complexity: 1-3 performance deficits  Clinical Decision Making Complexity (OT): problem focused assessment/low complexity  Overall Complexity of Evaluation (OT): low complexity     Time Calculation- OT       Row Name 02/04/25 1411             Time Calculation- OT    OT Start Time 1107  -      OT Received On 02/04/25  -      OT Goal Re-Cert Due Date 02/14/25  -         Untimed Charges    OT Eval/Re-eval Minutes 45  -AH         Total Minutes    Untimed Charges Total Minutes 45  -AH       Total Minutes 45  -AH                User Key  (r) = Recorded By, (t) = Taken By, (c) = Cosigned By      Initials Name Provider Type     Dary Ku Occupational Therapist                  Therapy Charges for Today       Code Description Service Date Service Provider Modifiers Qty    [id number]  OT EVAL LOW COMPLEXITY 3 2/4/2025 Dary Ku GO 1                 Dary Ku  2/4/2025

## 2025-02-04 NOTE — PLAN OF CARE
Goal Outcome Evaluation:  Plan of Care Reviewed With: patient        Progress: improving     VSS, pt on 3 L NC, and afib on telemetry att. Medications administered per order. Pt worked with PT during shift, sat up in chair. FSBS monitored per orders, insulin administered per orders. Discharge is pending.

## 2025-02-04 NOTE — THERAPY EVALUATION
Patient Name: Blane Dietz  : 1935    MRN: [id number]                              Today's Date: 2025       Admit Date: 2/3/2025    Visit Dx:     ICD-10-CM ICD-9-CM   1. Atrial fibrillation with rapid ventricular response  I48.91 427.31   2. Other hypervolemia  E87.79 276.69     Patient Active Problem List   Diagnosis    Essential hypertension    Type 2 diabetes mellitus with hyperglycemia, with long-term current use of insulin    Prostate cancer    Aortic stenosis    Sleep apnea    S/P AVR    HLD (hyperlipidemia)    Renal mass    s/p right nepheroureterectomy and adrenalectomy     Diffuse large B-cell lymphoma of solid organ excluding spleen    PICC (peripherally inserted central catheter) flush    History of pulmonary embolism    History of malignant neoplasm of prostate    Lymphoma of kidney    Stage 3b chronic kidney disease    Pneumonia due to COVID-19 virus    Abrasion    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)    Valvular heart disease    Atrial arrhythmia    RBBB    Atrial fibrillation with rapid ventricular response     Past Medical History:   Diagnosis Date    Aortic stenosis     status post ROSS procedure, remote, with 2015 echocardiography revealing normal aortic and pulmonary valve function, normal ejection fraction and mild to moderate MR    Arthritis     Bilateral impacted cerumen 2016    Bronchitis     CHF (congestive heart failure)     Chronic gout of right foot 2016    Impression: 2016 - stable.;     COVID-19 vaccine series completed 2021    [place] 2nd dose 3/12/21.    Depression     Diabetes mellitus     Diverticulitis     Exogenous obesity     Gout     Heart murmur     History of vitamin D deficiency     Hypercholesteremia 2016    Hyperlipidemia     Hypertension     Impaired mobility     Kidney lesion     Malignant neoplasm of prostate     Morbid obesity 2016    Pneumonia 2021    Primary osteoarthritis  involving multiple joints 06/13/2016    Impression: 03/08/2016 - stable;     Pulmonary embolism     Sleep apnea 05/12/2021    C-Pap    Uncontrolled type 2 diabetes mellitus with hyperglycemia 06/13/2016    Impression: 03/08/2016 - seeing Endo .;     Vertigo      Past Surgical History:   Procedure Laterality Date    CARDIAC VALVE REPLACEMENT  05/12/2021    Ross procedure 22 years ago    COLON SURGERY      COLONOSCOPY      COLOSTOMY  1999    COLOSTOMY REVISION      CYSTOSCOPY N/A 12/7/2021    Procedure: CYSTOSCOPY FLEXIBLE;  Surgeon: José Miguel Villafuerte Jr., MD;  Location:  [facility];  Service: Urology;  Laterality: N/A;    EXPLORATORY LAPAROTOMY      With Tissue Removal    LIPOMA EXCISION      MOHS SURGERY      NEPHROURETERECTOMY Right 12/7/2021    Procedure: RIGHT NEPHROURETERECTOMY LAPAROSCOPIC WITH DAVINCI ROBOT;  Surgeon: José Miguel Villafuerte Jr., MD;  Location:  [facility];  Service: Robotics - DaVinci;  Laterality: Right;    OTHER SURGICAL HISTORY      Porcine Valve    PROSTATE SURGERY      PROSTATECTOMY  2000     History of Prostatectomy Perineal Radical    SKIN CANCER EXCISION      from head and lip    TONSILLECTOMY        General Information       Row Name 02/04/25 1108          Physical Therapy Time and Intention    Document Type evaluation (P)   -     Mode of Treatment individual therapy;physical therapy (P)   -       Row Name 02/04/25 1108          General Information    Patient Profile Reviewed yes (P)   -     Prior Level of Function independent:;all household mobility;community mobility;ADL's (P)   -     Existing Precautions/Restrictions fall (P)   -     Barriers to Rehab none identified (P)   -       Row Name 02/04/25 1108          Living Environment    People in Home alone (P)   lives at [facility]       Row Name 02/04/25 1108          Home Main Entrance    Number of Stairs, Main Entrance none (P)   -       Row Name 02/04/25 1108          Stairs Within Home, Primary    Number of  Stairs, Within Home, Primary none (P)   -       Row Name 02/04/25 1108          Cognition    Orientation Status (Cognition) oriented x 4 (P)   -       Row Name 02/04/25 1108          Safety Issues/Impairments Affecting Functional Mobility    Safety Issues Affecting Function (Mobility) positioning of assistive device;judgment;safety precaution awareness;awareness of need for assistance (P)   -     Impairments Affecting Function (Mobility) balance;endurance/activity tolerance;strength (P)   -               User Key  (r) = Recorded By, (t) = Taken By, (c) = Cosigned By      Initials Name Provider Type     Farhad Kaur PT Student PT Student                   Mobility       Row Name 02/04/25 1108          Bed Mobility    Bed Mobility bed mobility (all) activities (P)   -     All Activities, [place] (Bed Mobility) standby assist (P)   -     Assistive Device (Bed Mobility) bed rails;head of bed elevated (P)   -       Row Name 02/04/25 1108          Sit-Stand Transfer    Sit-Stand [place] (Transfers) contact guard;1 person assist (P)   -     Assistive Device (Sit-Stand Transfers) walker, front-wheeled (P)   -       Row Name 02/04/25 1108          Gait/Stairs (Locomotion)    [place] Level (Gait) contact guard;1 person assist (P)   -     Assistive Device (Gait) walker, front-wheeled (P)   -     Patient was able to Ambulate yes (P)   -     Distance in Feet (Gait) 60 (P)   -     Deviations/Abnormal Patterns (Gait) bilateral deviations;binta decreased;gait speed decreased;stride length decreased (P)   -     Bilateral Gait Deviations forward flexed posture (P)   -     [place] Level (Stairs) not tested (P)   -               User Key  (r) = Recorded By, (t) = Taken By, (c) = Cosigned By      Initials Name Provider Type     Farhad Kaur PT Student PT Student                   Obj/Interventions       Row Name 02/04/25 1336          Range of Motion Comprehensive    General  Range of Motion no range of motion deficits identified (P)   -JH       Row Name 02/04/25 1336          Strength Comprehensive (MMT)    General Manual Muscle Testing (MMT) Assessment no strength deficits identified (P)   -JH       Row Name 02/04/25 1336          Balance    Balance Assessment sitting static balance;sitting dynamic balance;standing static balance;standing dynamic balance (P)   -     Static Sitting Balance standby assist (P)   -     Dynamic Sitting Balance standby assist (P)   -     Position, Sitting Balance unsupported;sitting edge of bed (P)   -     Static Standing Balance contact guard (P)   -     Dynamic Standing Balance contact guard (P)   -     Position/Device Used, Standing Balance unsupported;walker, front-wheeled (P)   -     Balance Interventions sit to stand;standing;weight shifting activity (P)   -JH       Row Name 02/04/25 1336          Sensory Assessment (Somatosensory)    Sensory Assessment (Somatosensory) sensation intact (P)   -               User Key  (r) = Recorded By, (t) = Taken By, (c) = Cosigned By      Initials Name Provider Type     Farhad Kaur, PT Student PT Student                   Goals/Plan       Row Name 02/04/25 1350          Gait Training Goal 1 (PT)    Activity/Assistive Device (Gait Training Goal 1, PT) gait (walking locomotion);assistive device use;decrease fall risk;improve balance and speed;increase endurance/gait distance;increase energy conservation;walker, rolling (P)   -     [place] Level (Gait Training Goal 1, PT) modified independence (P)   -     Distance (Gait Training Goal 1, PT) 150 (P)   -     Time Frame (Gait Training Goal 1, PT) short term goal (STG) (P)   -     Progress/Outcome (Gait Training Goal 1, PT) new goal (P)   -JH       Row Name 02/04/25 1350          Patient Education Goal (PT)    Activity (Patient Education Goal, PT) Pt will perform 20 repetitions of B LE exercises to improve functional activity tolerance. (P)    -     [place]/Cues/Accuracy (Memory Goal 2, PT) demonstrates adequately (P)   -     Time Frame (Patient Education Goal, PT) long term goal (LTG) (P)   -     Progress/Outcome (Patient Education Goal, PT) new goal (P)   -       Row Name 02/04/25 1350          Therapy Assessment/Plan (PT)    Planned Therapy Interventions (PT) balance training;bed mobility training;gait training;home exercise program;neuromuscular re-education;patient/family education;postural re-education;ROM (range of motion);strengthening;transfer training (P)   -               User Key  (r) = Recorded By, (t) = Taken By, (c) = Cosigned By      Initials Name Provider Type    Farhad Rosales, PT Student PT Student                   Clinical Impression       Row Name 02/04/25 1108          Pain    Pretreatment Pain Rating 0/10 - no pain (P)   -     Posttreatment Pain Rating 0/10 - no pain (P)   -       Row Name 02/04/25 1102          Plan of Care Review    Plan of Care Reviewed With patient (P)   -     Progress no change (P)   -     Outcome Evaluation PT evaluation completed today. Pt is an 89 year old male received sitting EOB with HR of 95 and oxygen saturation of 98% 3L nc. Pt reports living at [facility] and reports independence with all ADL's and uses a front wheel walker for most ambulation but also has a rollator he uses as a seat. Pt denies any pain at this time. Pt performed sit to stand with CGA and front wheel walker. Pt ambulated 60' with CGA and front wheel walker and needed occasional cues to step toward walker and correct forward flexed posture. Pt sat in recliner and strength in B LE was WFL. Pt demonstrated good static and dynamic standing balance throughout session. Evaluation ended with pt seated in recliner with call bell within reach and HR of 93 and oxygen saturation of 100%. Pt will benefit from skilled PT to improve functional activity tolerance. (P)   -       Row Name 02/04/25 1104          Therapy  Assessment/Plan (PT)    Patient/Family Therapy Goals Statement (PT) Pt wants to return to prior level of independence. (P)   -     Rehab Potential (PT) good (P)   -     Criteria for Skilled Interventions Met (PT) yes;meets criteria;skilled treatment is necessary (P)   -     Therapy Frequency (PT) 3 times/wk (P)   -       Row Name 02/04/25 1108          Vital Signs    Pretreatment Heart Rate (beats/min) 95 (P)   -     Posttreatment Heart Rate (beats/min) 93 (P)   -     Pre SpO2 (%) 98 (P)   -     O2 Delivery Pre Treatment supplemental O2 (P)   3L  -     Post SpO2 (%) 100 (P)   -     O2 Delivery Post Treatment supplemental O2 (P)   -     Pre Patient Position Sitting (P)   -     Post Patient Position Sitting (P)   -       Row Name 02/04/25 1108          Positioning and Restraints    Pre-Treatment Position sitting in chair/recliner (P)   -     Post Treatment Position chair (P)   -     In Chair sitting;call light within reach;encouraged to call for assist (P)   -               User Key  (r) = Recorded By, (t) = Taken By, (c) = Cosigned By      Initials Name Provider Type     Farhad Kaur, PT Student PT Student                   Outcome Measures       Row Name 02/04/25 1352 02/04/25 0800       How much help from another person do you currently need...    Turning from your back to your side while in flat bed without using bedrails? 4 (P)   - 3  -KJ    Moving from lying on back to sitting on the side of a flat bed without bedrails? 4 (P)   - 3  -KJ    Moving to and from a bed to a chair (including a wheelchair)? 4 (P)   - 3  -KJ    Standing up from a chair using your arms (e.g., wheelchair, bedside chair)? 4 (P)   - 3  -KJ    Climbing 3-5 steps with a railing? 3 (P)   - 2  -KJ    To walk in hospital room? 4 (P)   - 3  -KJ    AM-PAC 6 Clicks Score (PT) 23 (P)   - 17  -KJ    Highest Level of Mobility Goal 7 --> Walk 25 feet or more (P)   - 5 --> Static standing  -KJ      Row Name  02/04/25 1352          Functional Assessment    Outcome Measure Options AM-PAC 6 Clicks Basic Mobility (PT) (P)   -               User Key  (r) = Recorded By, (t) = Taken By, (c) = Cosigned By      Initials Name Provider Type    Frances Noble, RN Registered Nurse     Farhad Kaur, PT Student PT Student                                 Physical Therapy Education       Title: PT OT SLP Therapies (In Progress)       Topic: Physical Therapy (In Progress)       Point: Mobility training (Done)       Learning Progress Summary            Patient Acceptance, E,TB, VU by  at 2/4/2025 1353    Comment: Pt trained in functional activities, proper usage of walker, and PT POC.                      Point: Home exercise program (Not Started)       Learner Progress:  Not documented in this visit.              Point: Body mechanics (Not Started)       Learner Progress:  Not documented in this visit.              Point: Precautions (Not Started)       Learner Progress:  Not documented in this visit.                              User Key       Initials Effective Dates Name Provider Type Discipline     01/13/25 -  Farhad Kaur, PT Student PT Student PT                  PT Recommendation and Plan  Planned Therapy Interventions (PT): (P) balance training, bed mobility training, gait training, home exercise program, neuromuscular re-education, patient/family education, postural re-education, ROM (range of motion), strengthening, transfer training  Progress: (P) no change  Outcome Evaluation: (P) PT evaluation completed today. Pt is an 89 year old male received sitting EOB with HR of 95 and oxygen saturation of 98% 3L nc. Pt reports living at [facility] and reports independence with all ADL's and uses a front wheel walker for most ambulation but also has a rollator he uses as a seat. Pt denies any pain at this time. Pt performed sit to stand with CGA and front wheel walker. Pt ambulated 60' with CGA and front wheel walker and  needed occasional cues to step toward walker and correct forward flexed posture. Pt sat in recliner and strength in B LE was WFL. Pt demonstrated good static and dynamic standing balance throughout session. Evaluation ended with pt seated in recliner with call bell within reach and HR of 93 and oxygen saturation of 100%. Pt will benefit from skilled PT to improve functional activity tolerance.     Time Calculation:   PT Evaluation Complexity  History, PT Evaluation Complexity: (P) 1-2 personal factors and/or comorbidities  Examination of Body Systems (PT Eval Complexity): (P) 1-2 elements  Clinical Presentation (PT Evaluation Complexity): (P) stable  Clinical Decision Making (PT Evaluation Complexity): (P) low complexity  Overall Complexity (PT Evaluation Complexity): (P) low complexity     PT Charges       Row Name 02/04/25 1108             Time Calculation    Start Time 1108 (P)   -      PT Received On 02/04/25 (P)   -      PT Goal Re-Cert Due Date 02/14/25 (P)   -         Untimed Charges    PT Eval/Re-eval Minutes 45 (P)   -         Total Minutes    Untimed Charges Total Minutes 45 (P)   -       Total Minutes 45 (P)   -                User Key  (r) = Recorded By, (t) = Taken By, (c) = Cosigned By      Initials Name Provider Type     Farhad Kuar, PT Student PT Student                  Therapy Charges for Today       Code Description Service Date Service Provider Modifiers Qty    [id number]  PT EVAL LOW COMPLEXITY 3 2/4/2025 Farhad Kaur PT Student GP 1            PT G-Codes  Outcome Measure Options: (P) AM-PAC 6 Clicks Basic Mobility (PT)  AM-PAC 6 Clicks Score (PT): (P) 23  PT Discharge Summary  Anticipated Discharge Disposition (PT): (P) home with home health    Farhad Kaur PT Student  2/4/2025

## 2025-02-04 NOTE — PROGRESS NOTES
[facility] HOSPITALIST    PROGRESS NOTE    Name:  Blane Dietz   Age:  89 y.o.  Sex:  male  :  1935  MRN:  [id number]   Visit Number:  [id number]  Admission Date:  2/3/2025  Date Of Service:  25  Primary Care Physician:  David Em MD     LOS: 1 day :    Chief Complaint:      Shortness of air    Subjective:    Patient seen and examined.  Disgruntled as he did not sleep well, having issues with CPAP, room was too warm.  Advised worsening renal function and would recommend further admission day with increased water intake.    Hospital Course:    Mr. Dietz is an 89-year-old male with pertinent past medical history of atrial fibrillation on Eliquis, chronic kidney disease stage III, B-cell lymphoma, hypertension, hyperlipidemia, prior Ross procedure due to aortic stenosis, chronic systolic heart failure, chronic oxygen dependence at 2 L, sleep apnea, who presented to emergency department due to shortness of air.  Patient discontinued his Eliquis due to subconjunctival hematoma.  Patient had not been taking metoprolol at home due to not feeling well.    Upon ED presentation patient heart rate noted to be 161, requiring 4 L nasal cannula in order to maintain appropriate saturations.  Pertinent labs and imaging noted Highly sensitive troponin 47, proBNP 11,175, potassium 5.4, anion gap 15.1, creatinine 1.55, total bilirubin 1.8, WBC 7.93, COVID and flu negative, chest x-ray shows sternotomy wires bilateral effusions right greater than left.  Hospitalist consulted for further medical management.  Cardiology also consulted for recommendations.  Per cardiology suspects flutter with rapid ventricular rates exacerbating CHF.  Patient to continue metoprolol, consider restarting Jardiance and Entresto as kidney function allows.  Lasix was continued.  Advised to restart Eliquis for CVA prophylaxis and consider referral for ablation upon follow-up with outpatient cardiologist.  Also due to  valvular heart disease patient will need to follow-up with established outpatient cardiologist as recent echo also noting moderate aortic insufficiency and mitral regurgitation.    Review of Systems:     All systems were reviewed and negative except as mentioned in subjective, assessment and plan.    Vital Signs:    Temp:  [97.6 °F (36.4 °C)-98.2 °F (36.8 °C)] 97.6 °F (36.4 °C)  Heart Rate:  [89-96] 96  Resp:  [18-20] 18  BP: (105-121)/(63-74) 107/64    Intake and output:    I/O last 3 completed shifts:  In: -   Out: 200 [Urine:200]  I/O this shift:  In: 500 [P.O.:500]  Out: -     Physical Examination:    General Appearance:  Alert and cooperative.  Chronically ill elderly male.   Head:  Atraumatic and normocephalic.   Eyes: Conjunctivae and sclerae normal, no icterus. No pallor.   Throat: No oral lesions, no thrush, oral mucosa moist.   Neck: Supple, trachea midline, no thyromegaly.   Lungs:   Breath sounds heard bilaterally equally.  Unlabored on 3 L nasal cannula.   Heart:  Normal S1 and S2, regularly irregular no murmur, no gallop, no rub. No JVD.   Abdomen:   Normal bowel sounds, no masses, no organomegaly. Soft, nontender, nondistended, no rebound tenderness.   Extremities: Supple, trace bilateral lower extremity edema, no cyanosis, no clubbing.   Skin: No bleeding or rash.   Neurologic: Alert and oriented x 3. No facial asymmetry. Moves all four limbs. No tremors.  Generalized weakness.     Laboratory results:    Results from last 7 days   Lab Units 02/04/25  0553 02/03/25  0422   SODIUM mmol/L 139 138   POTASSIUM mmol/L 5.3* 5.4*   CHLORIDE mmol/L 104 104   CO2 mmol/L 22.2 18.9*   BUN mg/dL 40* 30*   CREATININE mg/dL 2.05* 1.55*   CALCIUM mg/dL 8.0* 7.9*   BILIRUBIN mg/dL 1.5* 1.8*   ALK PHOS U/L 132* 125*   ALT (SGPT) U/L 19 15   AST (SGOT) U/L 38 32   GLUCOSE mg/dL 110* 218*     Results from last 7 days   Lab Units 02/04/25  0553 02/03/25  0422   WBC 10*3/mm3 7.76 7.93   HEMOGLOBIN g/dL 13.4 14.3  "  HEMATOCRIT % 42.8 45.4   PLATELETS 10*3/mm3 118* 108*         Results from last 7 days   Lab Units 02/03/25  0615 02/03/25  0422   HSTROP T ng/L 47* 47*         No results for input(s): \"PHART\", \"PJS6UML\", \"PO2ART\", \"XVC0GCF\", \"BASEEXCESS\" in the last 8760 hours.   I have reviewed the patient's laboratory results.    Radiology results:    XR Chest 1 View    Result Date: 2/3/2025  PROCEDURE: XR CHEST 1 VW-  HISTORY: SOA Triage Protocol, worsening shortness of breath for 1 day.  COMPARISON: None.  FINDINGS: The heart is enlarged, stable.. Again noted are bilateral pleural effusions, right greater than left; these are stable. Pulmonary vascular congestion and bilateral interstitial disease worse in the lung bases also appears stable. Bibasilar atelectasis or infiltrate is stable.. Again noted is prominence of the right paratracheal region secondary to a dilated ascending aorta as shown on CT chest of 12/21/2024; this is stable.. There is no pneumothorax.  There are no acute osseous abnormalities. Status post median sternotomy. Apical lordotic positioning noted. There is evidence of old calcified granulomatous disease.      Impression: Stable appearance of CHF compared to prior. Recommend follow-up..     This report was signed and finalized on 2/3/2025 7:52 AM by Di Ayon MD.     I have reviewed the patient's radiology reports.    Medication Review:     I have reviewed the patient's active and prn medications.     Problem List:      Atrial arrhythmia    Type 2 diabetes mellitus with hyperglycemia, with long-term current use of insulin    Stage 3b chronic kidney disease    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)    Valvular heart disease    RBBB    Atrial fibrillation with rapid ventricular response      Assessment:    Acute on chronic systolic heart failure with reduced EF, POA  Acute on chronic hypoxic respiratory failure secondary to above, POA  Atrial flutter with RVR, " POA  Valvular heart disease status post Ross procedure  Acute kidney injury on chronic kidney disease stage IIIb  Diabetes mellitus type 2  History of B-cell lymphoma  History of prostate cancer  Impaired mobility and ADLs    Plan:    Heart failure/respiratory failure  -Likely exacerbated by atrial flutter with RVR  -Continue goal-directed medical therapy as tolerated.  -Consider nephrology consult tomorrow if worsening kidney function noted.  -Currently requiring 3 L nasal cannula.  -Continue metoprolol.    Atrial flutter/valvular heart disease  -Follows with Dr. Toussaint.  -Has follow-up on Thursday.  -Continue Eliquis/metoprolol/amiodarone.  -Status post Ross procedure for history of aortic stenosis.  -Consider ablation and upon follow-up with outpatient cardiology.    GERALD on CKD  -Will hold allopurinol and Jardiance at this time.  -Baseline creatinine appears to be 1.3-1.7.  -Unfortunately patient unable to tolerate Entresto.  -Worsening kidney function noted this morning.  Will hold Lasix and consult nephrology if worsening for diuresis recommendations.  -Low potassium diet.    -Continue home medications as appropriate.  -Palliative consulted.  -I have reviewed the copied text and it is accurate as of 2/4/2025     DVT Prophylaxis: Eliquis  Code Status: Full code  Diet: Cardiac/diabetic/low potassium  Discharge Plan: Home in 1 to 2 days.    Abbey Cruz, APRN  02/04/25  14:39 EST    Dictated utilizing Dragon dictation.

## 2025-02-04 NOTE — PROGRESS NOTES
RT EQUIPMENT DEVICE RELATED - SKIN ASSESSMENT    Chuck Score:  Chuck Score: 19     RT Medical Equipment/Device:     NIV Mask:  Under-the-nose   size:  b    Skin Assessment:      Nose:  Intact    Device Skin Pressure Protection:   none    Nurse Notification:  No    Steffanie Atkinson, CRT

## 2025-02-04 NOTE — CASE MANAGEMENT/SOCIAL WORK
Case Management/Social Work    Patient Name:  Blane Dietz  YOB: 1935  MRN: [id number]  Admit Date:  2/3/2025        14:51 EST   Discharge Plan       Row Name 02/04/25 1451       Plan    Plan DCP-Return to [facility]. Agreeable to home health. Daughter to transport.                1050: CM spoke with pt at bedside. Alert and answers questions coherently. DCP Wants to return to [facility]. States Daughter will transport him and agreeable to home health.        Electronically signed by:  Jillian Reyes RN  02/04/25 14:51 EST

## 2025-02-04 NOTE — PLAN OF CARE
Goal Outcome Evaluation:  Plan of Care Reviewed With: (P) patient        Progress: (P) no change  Outcome Evaluation: (P) PT evaluation completed today. Pt is an 89 year old male received sitting EOB with HR of 95 and oxygen saturation of 98% 3L nc. Pt reports living at [facility] and reports independence with all ADL's and uses a front wheel walker for most ambulation but also has a rollator he uses as a seat. Pt denies any pain at this time. Pt performed sit to stand with CGA and front wheel walker. Pt ambulated 60' with CGA and front wheel walker and needed occasional cues to step toward walker and correct forward flexed posture. Pt sat in recliner and strength in B LE was WFL. Pt demonstrated good static and dynamic standing balance throughout session. Evaluation ended with pt seated in recliner with call bell within reach and HR of 93 and oxygen saturation of 100%. Pt will benefit from skilled PT to improve functional activity tolerance.    Anticipated Discharge Disposition (PT): (P) home with home health

## 2025-02-05 LAB
ANION GAP SERPL CALCULATED.3IONS-SCNC: 15.8 MMOL/L (ref 5–15)
BASOPHILS # BLD AUTO: 0.06 10*3/MM3 (ref 0–0.2)
BASOPHILS NFR BLD AUTO: 0.9 % (ref 0–1.5)
BUN SERPL-MCNC: 48 MG/DL (ref 8–23)
BUN/CREAT SERPL: 25.9 (ref 7–25)
CALCIUM SPEC-SCNC: 7.7 MG/DL (ref 8.6–10.5)
CHLORIDE SERPL-SCNC: 105 MMOL/L (ref 98–107)
CO2 SERPL-SCNC: 19.2 MMOL/L (ref 22–29)
CREAT SERPL-MCNC: 1.85 MG/DL (ref 0.76–1.27)
DEPRECATED RDW RBC AUTO: 53.4 FL (ref 37–54)
EGFRCR SERPLBLD CKD-EPI 2021: 34.8 ML/MIN/1.73
EOSINOPHIL # BLD AUTO: 0.01 10*3/MM3 (ref 0–0.4)
EOSINOPHIL NFR BLD AUTO: 0.1 % (ref 0.3–6.2)
ERYTHROCYTE [DISTWIDTH] IN BLOOD BY AUTOMATED COUNT: 15.5 % (ref 12.3–15.4)
GLUCOSE BLDC GLUCOMTR-MCNC: 128 MG/DL (ref 70–130)
GLUCOSE BLDC GLUCOMTR-MCNC: 152 MG/DL (ref 70–130)
GLUCOSE BLDC GLUCOMTR-MCNC: 170 MG/DL (ref 70–130)
GLUCOSE BLDC GLUCOMTR-MCNC: 203 MG/DL (ref 70–130)
GLUCOSE BLDC GLUCOMTR-MCNC: 218 MG/DL (ref 70–130)
GLUCOSE BLDC GLUCOMTR-MCNC: 57 MG/DL (ref 70–130)
GLUCOSE BLDC GLUCOMTR-MCNC: 61 MG/DL (ref 70–130)
GLUCOSE BLDC GLUCOMTR-MCNC: 66 MG/DL (ref 70–130)
GLUCOSE SERPL-MCNC: 56 MG/DL (ref 65–99)
HCT VFR BLD AUTO: 42.9 % (ref 37.5–51)
HGB BLD-MCNC: 13.8 G/DL (ref 13–17.7)
IMM GRANULOCYTES # BLD AUTO: 0.12 10*3/MM3 (ref 0–0.05)
IMM GRANULOCYTES NFR BLD AUTO: 1.7 % (ref 0–0.5)
LYMPHOCYTES # BLD AUTO: 0.77 10*3/MM3 (ref 0.7–3.1)
LYMPHOCYTES NFR BLD AUTO: 11 % (ref 19.6–45.3)
MCH RBC QN AUTO: 30.2 PG (ref 26.6–33)
MCHC RBC AUTO-ENTMCNC: 32.2 G/DL (ref 31.5–35.7)
MCV RBC AUTO: 93.9 FL (ref 79–97)
MONOCYTES # BLD AUTO: 0.51 10*3/MM3 (ref 0.1–0.9)
MONOCYTES NFR BLD AUTO: 7.3 % (ref 5–12)
NEUTROPHILS NFR BLD AUTO: 5.56 10*3/MM3 (ref 1.7–7)
NEUTROPHILS NFR BLD AUTO: 79 % (ref 42.7–76)
NRBC BLD AUTO-RTO: 0 /100 WBC (ref 0–0.2)
PLATELET # BLD AUTO: 101 10*3/MM3 (ref 140–450)
PMV BLD AUTO: 10.8 FL (ref 6–12)
POTASSIUM SERPL-SCNC: 4.6 MMOL/L (ref 3.5–5.2)
RBC # BLD AUTO: 4.57 10*6/MM3 (ref 4.14–5.8)
SODIUM SERPL-SCNC: 140 MMOL/L (ref 136–145)
WBC NRBC COR # BLD AUTO: 7.03 10*3/MM3 (ref 3.4–10.8)

## 2025-02-05 PROCEDURE — 63710000001 INSULIN GLARGINE PER 5 UNITS: Performed by: INTERNAL MEDICINE

## 2025-02-05 PROCEDURE — 99231 SBSQ HOSP IP/OBS SF/LOW 25: CPT | Performed by: PHYSICIAN ASSISTANT

## 2025-02-05 PROCEDURE — 80048 BASIC METABOLIC PNL TOTAL CA: CPT | Performed by: NURSE PRACTITIONER

## 2025-02-05 PROCEDURE — 85025 COMPLETE CBC W/AUTO DIFF WBC: CPT | Performed by: NURSE PRACTITIONER

## 2025-02-05 PROCEDURE — 82948 REAGENT STRIP/BLOOD GLUCOSE: CPT

## 2025-02-05 PROCEDURE — 63710000001 INSULIN LISPRO (HUMAN) PER 5 UNITS: Performed by: INTERNAL MEDICINE

## 2025-02-05 PROCEDURE — 25010000002 FUROSEMIDE PER 20 MG: Performed by: INTERNAL MEDICINE

## 2025-02-05 PROCEDURE — 99232 SBSQ HOSP IP/OBS MODERATE 35: CPT | Performed by: NURSE PRACTITIONER

## 2025-02-05 RX ORDER — FUROSEMIDE 10 MG/ML
40 INJECTION INTRAMUSCULAR; INTRAVENOUS EVERY 6 HOURS
Status: DISPENSED | OUTPATIENT
Start: 2025-02-05 | End: 2025-02-06

## 2025-02-05 RX ADMIN — PRAVASTATIN SODIUM 40 MG: 20 TABLET ORAL at 09:24

## 2025-02-05 RX ADMIN — OXYCODONE HYDROCHLORIDE AND ACETAMINOPHEN 1000 MG: 500 TABLET ORAL at 09:24

## 2025-02-05 RX ADMIN — APIXABAN 2.5 MG: 2.5 TABLET, FILM COATED ORAL at 20:39

## 2025-02-05 RX ADMIN — APIXABAN 2.5 MG: 2.5 TABLET, FILM COATED ORAL at 09:24

## 2025-02-05 RX ADMIN — METOPROLOL SUCCINATE 25 MG: 25 TABLET, EXTENDED RELEASE ORAL at 09:25

## 2025-02-05 RX ADMIN — Medication 10 ML: at 09:25

## 2025-02-05 RX ADMIN — DEXTROSE MONOHYDRATE 50 ML: 25 INJECTION, SOLUTION INTRAVENOUS at 08:27

## 2025-02-05 RX ADMIN — INSULIN LISPRO 2 UNITS: 100 INJECTION, SOLUTION INTRAVENOUS; SUBCUTANEOUS at 18:24

## 2025-02-05 RX ADMIN — AMIODARONE HYDROCHLORIDE 200 MG: 200 TABLET ORAL at 09:25

## 2025-02-05 RX ADMIN — Medication 10 ML: at 20:39

## 2025-02-05 RX ADMIN — INSULIN LISPRO 1 UNITS: 100 INJECTION, SOLUTION INTRAVENOUS; SUBCUTANEOUS at 12:50

## 2025-02-05 RX ADMIN — INSULIN GLARGINE 8 UNITS: 100 INJECTION, SOLUTION SUBCUTANEOUS at 20:39

## 2025-02-05 RX ADMIN — FUROSEMIDE 40 MG: 10 INJECTION, SOLUTION INTRAMUSCULAR; INTRAVENOUS at 12:50

## 2025-02-05 NOTE — THERAPY TREATMENT NOTE
"OT attempted tx. Pt sitting EOB. Pt requested OT check back at later time secondary to \"person after person\" this am and states \"It's time for a nap.\" OT to follow up as time allows.  "

## 2025-02-05 NOTE — PROGRESS NOTES
"    [facility]     PALLIATIVE CARE FOLLOW UP NOTE    Name:  Blane Dietz   Age:  89 y.o.  Sex:  male  :  1935  MRN:  [id number]   Visit Number:  [id number]  Date Of Service:  25  Primary Care Physician:  David Em MD    Chief Complaint: Generalized weakness    Interval History:  Patient seen today during palliative care team rounds.  Record reviewed and case discussed with staff.  Hypoglycemic this morning, otherwise no acute events.  Upon assessment, he is sitting upright at bedside, interactive.  He reports improved sleep, as he was able to utilize home CPAP.  He reports not eating dinner last night, not feeling hungry after eating so much during the day.  Dyspnea symptoms have improved, in addition to his BLE edema.   He reports some generalized fatigue type symptoms this morning, has been working with therapy services.          Review of Systems   Constitutional:  Positive for activity change.   Cardiovascular:  Positive for leg swelling.   Neurological:  Positive for weakness.             Vitals: /92 (BP Location: Left arm, Patient Position: Lying)   Pulse 92   Temp 97.2 °F (36.2 °C) (Oral)   Resp 22   Ht 167.6 cm (66\")   Wt 90.2 kg (198 lb 13.7 oz)   SpO2 99%   BMI 32.10 kg/m²     Physical Exam  Vitals and nursing note reviewed.   Constitutional:       General: He is not in acute distress.     Appearance: He is obese. He is not diaphoretic.   HENT:      Head: Normocephalic and atraumatic.      Mouth/Throat:      Mouth: Mucous membranes are moist.      Pharynx: Oropharynx is clear.   Eyes:      Pupils: Pupils are equal, round, and reactive to light.   Cardiovascular:      Rate and Rhythm: Normal rate. Rhythm irregular.   Pulmonary:      Effort: Pulmonary effort is normal. No respiratory distress.      Breath sounds: No wheezing or rales.   Musculoskeletal:         General: Swelling (interval improvements) present. Normal range of motion.      Cervical back: Neck " supple.   Skin:     General: Skin is warm and dry.      Capillary Refill: Capillary refill takes less than 2 seconds.   Neurological:      Mental Status: He is alert and oriented to person, place, and time.   Psychiatric:         Mood and Affect: Mood normal.         Behavior: Behavior normal.          Results Reviewed:    Intake/Output Summary (Last 24 hours) at 2/5/2025 0829  Last data filed at 2/5/2025 0333  Gross per 24 hour   Intake 480 ml   Output 400 ml   Net 80 ml     Results from last 7 days   Lab Units 02/05/25  0640 02/04/25  0553   SODIUM mmol/L 140 139   POTASSIUM mmol/L 4.6 5.3*   CHLORIDE mmol/L 105 104   CO2 mmol/L 19.2* 22.2   BUN mg/dL 48* 40*   CREATININE mg/dL 1.85* 2.05*   CALCIUM mg/dL 7.7* 8.0*   BILIRUBIN mg/dL  --  1.5*   ALK PHOS U/L  --  132*   ALT (SGPT) U/L  --  19   AST (SGOT) U/L  --  38   GLUCOSE mg/dL 56* 110*     Results from last 7 days   Lab Units 02/05/25  0640   WBC 10*3/mm3 7.03   HEMOGLOBIN g/dL 13.8   HEMATOCRIT % 42.9   PLATELETS 10*3/mm3 101*       Medication Review:   I have reviewed the patients active and prn medications.     Palliative Care Assessment:  HFrEF with acute exacerbation  Atrial flutter with RVR  Valvular heart disease  History of prostate cancer  History of B cell lymphoma  CKD stg III  T2DM  Impaired mobility and ADLs    Recommendations/Plan:  - GOC discussions yield patient with desire to pursue plan in place with intent for recovery  - Discussed CODE STATUS in detail, he desires to maintain FULL CODE for now, however shares this is temporary and will likely reinstate DNR status following completion of taxes   - Likely dc back to [facility] once medically stable, CM following  - May benefit from ongoing PT/OT upon discharge back to [facility], he is ambulatory with use of a walker and working with therapy services  - Continued to encourage medication compliance   - Patient is scheduled to see Dr. Toussaint, Thursday as outpatient   - Palliative care will continue to  follow/support patient and family      CODE STATUS:   Code Status and Medical Interventions: CPR (Attempt to Resuscitate); Full Support   Ordered at: 02/03/25 0647     Code Status (Patient has no pulse and is not breathing):    CPR (Attempt to Resuscitate)     Medical Interventions (Patient has pulse or is breathing):    Full Support         I spent 25 total minutes, including face to face assessment, record review, coordination of care with staff, and counseling patient and/or family  Part of this note may be an electronic transcription/translation of spoken language to printed text using the Dragon Dictation System.    Jimena Silvestre PA-C  02/05/25  08:29 EST

## 2025-02-05 NOTE — PROGRESS NOTES
[facility] HOSPITALIST    PROGRESS NOTE    Name:  Blane Dietz   Age:  89 y.o.  Sex:  male  :  1935  MRN:  [id number]   Visit Number:  [id number]  Admission Date:  2/3/2025  Date Of Service:  25  Primary Care Physician:  David Em MD     LOS: 2 days :    Chief Complaint:      Shortness of air    Subjective:    Patient seen and examined.  Appears much improved.  Continues to require 3 L nasal cannula.  Unsure of how much oxygen he uses at home.  States he did sleep better.  He does have follow-up with cardiology tomorrow at noon that he is hoping to make.  Advised will have nephrology see him today for final recommendations.    Hospital Course:    Mr. Dietz is an 89-year-old male with pertinent past medical history of atrial fibrillation on Eliquis, chronic kidney disease stage III, B-cell lymphoma, hypertension, hyperlipidemia, prior Ross procedure due to aortic stenosis, chronic systolic heart failure, chronic oxygen dependence at 2 L, sleep apnea, who presented to emergency department due to shortness of air.  Patient discontinued his Eliquis due to subconjunctival hematoma.  Patient had not been taking metoprolol at home due to not feeling well.    Upon ED presentation patient heart rate noted to be 161, requiring 4 L nasal cannula in order to maintain appropriate saturations.  Pertinent labs and imaging noted Highly sensitive troponin 47, proBNP 11,175, potassium 5.4, anion gap 15.1, creatinine 1.55, total bilirubin 1.8, WBC 7.93, COVID and flu negative, chest x-ray shows sternotomy wires bilateral effusions right greater than left.  Hospitalist consulted for further medical management.  Cardiology also consulted for recommendations.  Per cardiology suspects flutter with rapid ventricular rates exacerbating CHF.  Patient to continue metoprolol, consider restarting Jardiance and Entresto as kidney function allows.  Lasix was continued.  Advised to restart Eliquis for CVA  prophylaxis and consider referral for ablation upon follow-up with outpatient cardiologist.  Also due to valvular heart disease patient will need to follow-up with established outpatient cardiologist as recent echo also noting moderate aortic insufficiency and mitral regurgitation.  Unfortunately patient unable to tolerate Jardiance at this time due to worsening kidney function.  Nephrology consulted for diuresis assistance.    Review of Systems:     All systems were reviewed and negative except as mentioned in subjective, assessment and plan.    Vital Signs:    Temp:  [97.2 °F (36.2 °C)-97.6 °F (36.4 °C)] 97.6 °F (36.4 °C)  Heart Rate:  [87-92] 87  Resp:  [18-22] 18  BP: ()/(56-92) 121/73    Intake and output:    I/O last 3 completed shifts:  In: 740 [P.O.:740]  Out: 575 [Urine:575]  I/O this shift:  In: 120 [P.O.:120]  Out: 300 [Urine:300]    Physical Examination:    General Appearance:  Alert and cooperative.  Chronically ill elderly male.   Head:  Atraumatic and normocephalic.   Eyes: Conjunctivae and sclerae normal, no icterus. No pallor.   Throat: No oral lesions, no thrush, oral mucosa moist.   Neck: Supple, trachea midline, no thyromegaly.   Lungs:   Breath sounds heard bilaterally equally.  Unlabored on 3 L nasal cannula.   Heart:  Normal S1 and S2, regularly irregular no murmur, no gallop, no rub. No JVD.   Abdomen:   Normal bowel sounds, no masses, no organomegaly. Soft, nontender, nondistended, no rebound tenderness.   Extremities: Supple, trace bilateral lower extremity edema, no cyanosis, no clubbing.  Bilateral lower extremity venous stasis.   Skin: No bleeding or rash.   Neurologic: Alert and oriented x 3. No facial asymmetry. Moves all four limbs. No tremors.  Generalized weakness.     Laboratory results:    Results from last 7 days   Lab Units 02/05/25  0640 02/04/25  0553 02/03/25  0422   SODIUM mmol/L 140 139 138   POTASSIUM mmol/L 4.6 5.3* 5.4*   CHLORIDE mmol/L 105 104 104   CO2 mmol/L  "19.2* 22.2 18.9*   BUN mg/dL 48* 40* 30*   CREATININE mg/dL 1.85* 2.05* 1.55*   CALCIUM mg/dL 7.7* 8.0* 7.9*   BILIRUBIN mg/dL  --  1.5* 1.8*   ALK PHOS U/L  --  132* 125*   ALT (SGPT) U/L  --  19 15   AST (SGOT) U/L  --  38 32   GLUCOSE mg/dL 56* 110* 218*     Results from last 7 days   Lab Units 02/05/25  0640 02/04/25  0553 02/03/25  0422   WBC 10*3/mm3 7.03 7.76 7.93   HEMOGLOBIN g/dL 13.8 13.4 14.3   HEMATOCRIT % 42.9 42.8 45.4   PLATELETS 10*3/mm3 101* 118* 108*         Results from last 7 days   Lab Units 02/03/25  0615 02/03/25  0422   HSTROP T ng/L 47* 47*         No results for input(s): \"PHART\", \"RMN5JIQ\", \"PO2ART\", \"TDD6NYC\", \"BASEEXCESS\" in the last 8760 hours.   I have reviewed the patient's laboratory results.    Radiology results:    No radiology results from the last 24 hrs  I have reviewed the patient's radiology reports.    Medication Review:     I have reviewed the patient's active and prn medications.     Problem List:      Atrial arrhythmia    Type 2 diabetes mellitus with hyperglycemia, with long-term current use of insulin    Stage 3b chronic kidney disease    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)    Valvular heart disease    RBBB    Atrial fibrillation with rapid ventricular response      Assessment:    Acute on chronic systolic heart failure with reduced EF, POA  Acute on chronic hypoxic respiratory failure secondary to above, POA  Atrial flutter with RVR, POA  Valvular heart disease status post Ross procedure  Acute kidney injury on chronic kidney disease stage IIIb  Diabetes mellitus type 2  History of B-cell lymphoma  History of prostate cancer  Impaired mobility and ADLs    Plan:    Heart failure/respiratory failure  -Likely exacerbated by atrial flutter with RVR  -Continue goal-directed medical therapy as tolerated.  Unable to restart Entresto or Jardiance at this time.  -Nephrology consulted for diuresis recommendations.  -Currently requiring 3 L nasal " cannula.  -Continue metoprolol.    Atrial flutter/valvular heart disease  -Follows with Dr. Toussaint.  -Has follow-up on Thursday.  -Continue Eliquis/metoprolol/amiodarone.  -Status post Ross procedure for history of aortic stenosis.  -Consider ablation and upon follow-up with outpatient cardiology.    GERALD on CKD  -Will hold allopurinol and Jardiance at this time.  -Baseline creatinine appears to be 1.3-1.7.  -Unfortunately patient unable to tolerate Entresto/Jardiance.  -Nephrology consulted for diuresis recommendations.  -Low potassium diet.    -Continue home medications as appropriate.  -Palliative consulted.  -I have reviewed the copied text and it is accurate as of 2/5/2025     DVT Prophylaxis: Eliquis  Code Status: Full code  Diet: Cardiac/diabetic/low potassium  Discharge Plan: Hopefully tomorrow/home with home health.    Abbey Cruz, APRN  02/05/25  13:08 EST    Dictated utilizing Dragon dictation.

## 2025-02-05 NOTE — CASE MANAGEMENT/SOCIAL WORK
Case Management/Social Work    Patient Name:  Blane Dietz  YOB: 1935  MRN: [id number]  Admit Date:  2/3/2025      Sw contacted [facility]. Spoke with Trisha. States pt is able to return to assisted living tomorrow.  [facility] is the in house therapy provider.  CM updated.       Electronically signed by:  LYNDSAY Saleh  02/05/25 14:54 EST

## 2025-02-05 NOTE — CONSULTS
[facility]      Nephrology Consultation      Referring Provider:   No ref. provider found    Reason for Consultation:  Acute Kidney Injury on chronic kidney disease 3b and associated problems.      Subjective:  Chief complaint   Chief Complaint   Patient presents with    Shortness of Breath     History of present illness:    89-year-old male resident [facility]. Past medical history of atrial fibrillation, has had a procedure on his aortic and pulmonic valves 25 years ago involving a porcine valve, CHF EF 36 to 40% with associated moderate mitral regurgitation, chronically on 2 L. Chief complaint dyspnea. Patient had a presented with acute dyspnea and fluid overload. Reported not taking his metoprolol as he had not been feeling well. Was very weak. Has not been taking Eliquis due to subconjunctival hematoma. Rate control improved with administration of the metoprolol in the ED. Patient continued to have low oxygen levels and dyspnea so we were asked to admit him. Started CPAP in the ED which he is chronically on at night.  He is on 3 L nasal cannula.  Patient feels fair today he denies shortness of breath, chest pain, nausea vomiting, abdominal pain.  He denies feeling cold though his lower legs are both cold to touch.  Patient has history of renal cell carcinoma with right nephrectomy in 2019.  He does not see nephrology.  Patient also has a history of prostate cancer with prostatectomy 25 years ago.  He follows with Dr. Ramon.  He also mentions he has history of partial colectomy due to a perforated diverticulum about 25 years ago, as well.  Patient has oxygen at home, was not using it and then about a month ago he started using 6 L at night and 2 L during the day.  Patient drinks about 3+ glasses of water per day, he does drink flavored water and 3-4 diet Pepsi's a day.  He denies NSAID use.  I have reviewed labs/imaging/records from this hospitalization, including ER staff and  admitting/attending physicians H/P's and progress notes to establish a comprehensive understanding of this patient's clinical hospital course, as well as to establish plan of care appropriately.   Past Medical History:   Diagnosis Date    Aortic stenosis     status post ROSS procedure, remote, with December 2015 echocardiography revealing normal aortic and pulmonary valve function, normal ejection fraction and mild to moderate MR    Arthritis     Bilateral impacted cerumen 11/23/2016    Bronchitis     CHF (congestive heart failure)     Chronic gout of right foot 06/13/2016    Impression: 03/08/2016 - stable.;     COVID-19 vaccine series completed 05/12/2021    Maderna 2nd dose 3/12/21.    Depression     Diabetes mellitus     Diverticulitis     Exogenous obesity     Gout     Heart murmur     History of vitamin D deficiency     Hypercholesteremia 08/11/2016    Hyperlipidemia     Hypertension     Impaired mobility     Kidney lesion     Malignant neoplasm of prostate     Morbid obesity 08/11/2016    Pneumonia 05/12/2021    Primary osteoarthritis involving multiple joints 06/13/2016    Impression: 03/08/2016 - stable;     Pulmonary embolism     Sleep apnea 05/12/2021    C-Pap    Uncontrolled type 2 diabetes mellitus with hyperglycemia 06/13/2016    Impression: 03/08/2016 - seeing Endo .;     Vertigo        Past Surgical History:   Procedure Laterality Date    CARDIAC VALVE REPLACEMENT  05/12/2021    Ross procedure 22 years ago    COLON SURGERY      COLONOSCOPY      COLOSTOMY  1999    COLOSTOMY REVISION      CYSTOSCOPY N/A 12/7/2021    Procedure: CYSTOSCOPY FLEXIBLE;  Surgeon: José Miguel Villafuerte Jr., MD;  Location: [facility] OR;  Service: Urology;  Laterality: N/A;    EXPLORATORY LAPAROTOMY      With Tissue Removal    LIPOMA EXCISION      MOHS SURGERY      NEPHROURETERECTOMY Right 12/7/2021    Procedure: RIGHT NEPHROURETERECTOMY LAPAROSCOPIC WITH DAVINCI ROBOT;  Surgeon: José Miguel Villafuerte Jr., MD;  Location: [facility]  OR;  Service: Robotics - DaVinci;  Laterality: Right;    OTHER SURGICAL HISTORY      Porcine Valve    PROSTATE SURGERY      PROSTATECTOMY  2000     History of Prostatectomy Perineal Radical    SKIN CANCER EXCISION      from head and lip    TONSILLECTOMY       Family History   Problem Relation Age of Onset    Diabetes Mother     Lung cancer Mother     Cancer Mother     Heart disease Father     Breast cancer Other     Cancer Other     Hypertension Other     Migraines Other     Obesity Other     Diabetes Other      negative h/o ESRD     Social History     Tobacco Use    Smoking status: Never     Passive exposure: Never    Smokeless tobacco: Never   Vaping Use    Vaping status: Never Used   Substance Use Topics    Alcohol use: No    Drug use: No     Home medications:   Prior to Admission Medications       Prescriptions Last Dose Informant Patient Reported? Taking?    allopurinol (ZYLOPRIM) 100 MG tablet Past Week  No Yes    TAKE 1 TABLET BY MOUTH DAILY    amiodarone (PACERONE) 200 MG tablet Past Week  Yes Yes    Take 1 tablet by mouth Daily.    apixaban (ELIQUIS) 2.5 MG tablet tablet Past Week  No Yes    Take 1 tablet by mouth Every 12 (Twelve) Hours for 30 days. Indications: Atrial Fibrillation    ascorbic acid (VITAMIN C) 1000 MG tablet Past Week  Yes Yes    Take 1 tablet by mouth Daily.    Calcium Carb-Cholecalciferol 600-5 MG-MCG tablet Past Week  No Yes    Take 1 tablet by mouth Every Other Day. Indications: Low Amount of Calcium in the Blood    CINNAMON PO Past Week  Yes Yes    Take 1 tablet by mouth Daily. Patient unsure of dose    Entresto 24-26 MG tablet Past Week  No Yes    TAKE ONE TABLET BY MOUTH EVERY 12 HOURS    enzalutamide (XTANDI) 40 MG tablet tablet Past Week  No Yes    Take 3 tablets by mouth Daily.    fluticasone (FLONASE) 50 MCG/ACT nasal spray Past Week  No Yes    2 sprays into the nostril(s) as directed by provider Daily. 2 puffs each nostril    Patient taking differently:  Administer 2 sprays  "into the nostril(s) as directed by provider Daily As Needed for Rhinitis or Allergies.    furosemide (LASIX) 40 MG tablet Past Week  No Yes    Take 1 tablet by mouth Daily. Take twice a day until swelling improves and then take once daily after that    Jardiance 25 MG tablet tablet Past Week  No Yes    TAKE 1 TABLET BY MOUTH DAILY    metoprolol succinate XL (TOPROL-XL) 25 MG 24 hr tablet Past Week  No Yes    Take 1 tablet by mouth Daily.    potassium chloride 10 MEQ CR tablet Past Week  No Yes    Take 2 tablets by mouth Daily for 21 days.    pravastatin (PRAVACHOL) 40 MG tablet Past Week  No Yes    TAKE ONE TABLET BY MOUTH EVERY NIGHT AT BEDTIME    VITAMIN D, CHOLECALCIFEROL, PO Past Week  Yes Yes    Take 1,000 Units by mouth Daily.    Apoaequorin (PREVAGEN PO) Unknown  Yes No    Take  by mouth.    ciclopirox (LOPROX) 1 % shampoo Not Taking  Yes No    Patient not taking:  Reported on 2/4/2025    Droplet Insulin Syringe 31G X 5/16\" 0.5 ML misc Unknown  Yes No    Dulaglutide (Trulicity) 3 MG/0.5ML solution pen-injector Not Taking  No No    Inject 0.5 mL under the skin into the appropriate area as directed 1 (One) Time Per Week.    Patient not taking:  Reported on 2/4/2025    fluocinonide (LIDEX) 0.05 % external solution Not Taking  Yes No    Patient not taking:  Reported on 2/4/2025    glucose blood (True Metrix Blood Glucose Test) test strip Unknown  No No    Use to test blood sugar 3 times daily.  Dx E11.65    ketoconazole (NIZORAL) 2 % shampoo Not Taking  Yes No    Apply 1 application  topically to the appropriate area as directed 2 (Two) Times a Week. Indications: Tinea Versicolor    Patient not taking:  Reported on 2/4/2025    Lantus 100 UNIT/ML injection   No No    Inject up to 50 units daily subcutaneously    Patient taking differently:  Inject up to 20 units daily subcutaneously    multivitamin with minerals tablet tablet Not Taking  Yes No    Take 1 tablet by mouth Daily. AREDS 2  Indications: vitamin " deficiency    Patient not taking:  Reported on 2/4/2025    triamcinolone (KENALOG) 0.1 % cream Not Taking  Yes No    Apply 1 Application topically to the appropriate area as directed 2 (Two) Times a Day.    Patient not taking:  Reported on 2/4/2025          Emergency department medications:   Medications   sodium chloride 0.9 % flush 10 mL (has no administration in time range)   allopurinol (ZYLOPRIM) tablet 100 mg ( Oral Dose Auto Held 2/19/25 0900)   amiodarone (PACERONE) tablet 200 mg (200 mg Oral Given 2/4/25 0825)   apixaban (ELIQUIS) tablet 2.5 mg (2.5 mg Oral Given 2/4/25 2105)   ascorbic acid (VITAMIN C) tablet 1,000 mg (1,000 mg Oral Given 2/4/25 0824)   empagliflozin (JARDIANCE) tablet 25 mg ( Oral Dose Auto Held 2/19/25 0900)   metoprolol succinate XL (TOPROL-XL) 24 hr tablet 25 mg (25 mg Oral Given 2/4/25 0826)   pravastatin (PRAVACHOL) tablet 40 mg (40 mg Oral Given 2/4/25 0825)   nitroglycerin (NITROSTAT) SL tablet 0.4 mg (has no administration in time range)   sodium chloride 0.9 % flush 10 mL (10 mL Intravenous Given 2/4/25 2106)   sodium chloride 0.9 % flush 10 mL (has no administration in time range)   sodium chloride 0.9 % infusion 40 mL (has no administration in time range)   acetaminophen (TYLENOL) tablet 650 mg (has no administration in time range)   calcium carbonate (TUMS) chewable tablet 500 mg (200 mg elemental) (has no administration in time range)   sennosides-docusate (PERICOLACE) 8.6-50 MG per tablet 2 tablet (has no administration in time range)     And   polyethylene glycol (MIRALAX) packet 17 g (has no administration in time range)     And   bisacodyl (DULCOLAX) EC tablet 5 mg (has no administration in time range)     And   bisacodyl (DULCOLAX) suppository 10 mg (has no administration in time range)   ondansetron ODT (ZOFRAN-ODT) disintegrating tablet 4 mg (has no administration in time range)     Or   ondansetron (ZOFRAN) injection 4 mg (has no administration in time range)  "  furosemide (LASIX) tablet 40 mg ( Oral Dose Auto Held 2/19/25 2100)   insulin glargine (LANTUS, SEMGLEE) injection 1-200 Units (12 Units Subcutaneous Given 2/4/25 2105)   insulin lispro (humaLOG) injection 1-200 Units (0 Units Subcutaneous Not Given 2/5/25 0816)   insulin lispro (humaLOG) injection 1-200 Units (has no administration in time range)   dextrose (GLUTOSE) oral gel 15 g (has no administration in time range)   dextrose (D50W) (25 g/50 mL) IV injection 10-50 mL (50 mL Intravenous Given 2/5/25 0827)   Glucagon (GLUCAGEN) injection 1 mg (has no administration in time range)   Potassium Replacement - Follow Nurse / BPA Driven Protocol (has no administration in time range)   Magnesium Standard Dose Replacement - Follow Nurse / BPA Driven Protocol (has no administration in time range)   Phosphorus Replacement - Follow Nurse / BPA Driven Protocol (has no administration in time range)   Calcium Replacement - Follow Nurse / BPA Driven Protocol (has no administration in time range)   metoprolol tartrate (LOPRESSOR) injection 2.5 mg (has no administration in time range)   metoprolol tartrate (LOPRESSOR) injection 5 mg (5 mg Intravenous Given 2/3/25 0434)       Allergies:  Ampicillin, Medrol [methylprednisolone], Myrbetriq [mirabegron], Penicillins, Bee venom, and Melatonin    Review of Systems  14 point review of system were done and are negative except as mentioned in the history of present illness and assessment and plan.      Physical Exam:  Objective:  Vital Signs  /92 (BP Location: Left arm, Patient Position: Lying)   Pulse 92   Temp 97.2 °F (36.2 °C) (Oral)   Resp 22   Ht 167.6 cm (66\")   Wt 90.2 kg (198 lb 13.7 oz)   SpO2 99%   BMI 32.10 kg/m²   Objective    I/O this shift:  In: 120 [P.O.:120]  Out: -     Intake/Output Summary (Last 24 hours) at 2/5/2025 0955  Last data filed at 2/5/2025 0852  Gross per 24 hour   Intake 600 ml   Output 400 ml   Net 200 ml        Physical " "Exam     Constitutional: Awake, alert  Eyes: sclerae anicteric, no conjunctival injection  HEENT: mucous membranes dry  Neck: Supple, no thyromegaly, no lymphadenopathy, trachea midline, No JVD  Respiratory: Clear to auscultation bilaterally, nonlabored respirations, patient on 3 L nasal cannula  Cardiovascular: RRR, no murmurs, rubs, or gallops.  Gastrointestinal: Positive bowel sounds, obese, soft, nontender, nondistended  Musculoskeletal: 2+ BLE edema, lower legs are erythematous and cold to touch, no clubbing or cyanosis  Psychiatric: Appropriate affect, cooperative  Neurologic: Oriented x 3, moving all extremities, Cranial Nerves grossly intact, speech clear  Skin: warm and dry, no rashes            Results Review:   Results from last 7 days   Lab Units 02/05/25  0640 02/04/25  0553 02/03/25  0422   SODIUM mmol/L 140 139 138   POTASSIUM mmol/L 4.6 5.3* 5.4*   CHLORIDE mmol/L 105 104 104   CO2 mmol/L 19.2* 22.2 18.9*   BUN mg/dL 48* 40* 30*   CREATININE mg/dL 1.85* 2.05* 1.55*   CALCIUM mg/dL 7.7* 8.0* 7.9*   ALBUMIN g/dL  --  4.0 3.9   BILIRUBIN mg/dL  --  1.5* 1.8*   ALK PHOS U/L  --  132* 125*   ALT (SGPT) U/L  --  19 15   AST (SGOT) U/L  --  38 32   GLUCOSE mg/dL 56* 110* 218*     Estimated Creatinine Clearance: 29.6 mL/min (A) (by C-G formula based on SCr of 1.85 mg/dL (H)).          Results from last 7 days   Lab Units 02/05/25  0640 02/04/25  0553 02/03/25  0422   WBC 10*3/mm3 7.03 7.76 7.93   HEMOGLOBIN g/dL 13.8 13.4 14.3   PLATELETS 10*3/mm3 101* 118* 108*         Brief Urine Lab Results  (Last result in the past 365 days)        Color   Clarity   Blood   Leuk Est   Nitrite   Protein   CREAT   Urine HCG        04/04/24 1502             10               No results found for: \"UTPCR\"  Imaging Results (Last 24 Hours)       ** No results found for the last 24 hours. **          [Held by provider] allopurinol, 100 mg, Oral, Daily  amiodarone, 200 mg, Oral, Daily  apixaban, 2.5 mg, Oral, Q12H  ascorbic acid, " 1,000 mg, Oral, Daily  [Held by provider] empagliflozin, 25 mg, Oral, Daily  [Held by provider] furosemide, 40 mg, Oral, BID  insulin glargine, 1-200 Units, Subcutaneous, Nightly - Glucommander  insulin lispro, 1-200 Units, Subcutaneous, 4x Daily With Meals & Nightly  metoprolol succinate XL, 25 mg, Oral, Daily  pravastatin, 40 mg, Oral, Daily  sodium chloride, 10 mL, Intravenous, Q12H           Assessment/Plan:      Atrial arrhythmia    Type 2 diabetes mellitus with hyperglycemia, with long-term current use of insulin    Stage 3b chronic kidney disease    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)    Valvular heart disease    RBBB    Atrial fibrillation with rapid ventricular response    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  Currently 34.8.  Cardiorenal syndrome, diabetes as causative agents.  Continue to optimize volume status.  Acute on chronic CHF: Patient with fluid overload, need for diuretics will be assessed on daily basis.  Hypertension: Blood pressure is on the low side, medications on hold likely will improve.  Atrial arrhythmia: Noncompliant with beta-blocker, improved on metoprolol  Type 2 diabetes: Last A1c on record was 9.2% on 7/25/2024.  PVD: Bilateral legs are erythematous and cold to touch.  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      Risk and complexity: High       Plan:  I will go ahead and give him 2 doses of Lasix 40 mg IV 6 hours apart.  I will assess him for diuretic use on daily basis.    Continue to hold antihypertensives, he still can continue with low-dose beta-blockers.  Continue with rest of the current treatment plan and surveillance labs.  Details were discussed with the patient no family in the room.    Details were also discussed with the hospitalist service.   Further recommendations will depend on clinical course of the patient during the current hospitalization.    I  also discussed the details with the nursing staff.  Rest as ordered.    In closing, I sincerely appreciate opportunity to participate in care of this patient. If I can be of any further assistance with the management of this patient, please don’t hesitate to contact me.    DAISY Shoemaker    02/05/25  09:23 EST    Dictated using Dragon.

## 2025-02-05 NOTE — CASE MANAGEMENT/SOCIAL WORK
Case Management/Social Work    Patient Name:  Blane Dietz  YOB: 1935  MRN: [id number]  Admit Date:  2/3/2025        17:01 EST   Discharge Plan       Row Name 02/05/25 1700       Plan    Plan DCP- Back to [facility] when ready. Daughter to transport.                        Electronically signed by:  Jillian Reyes RN  02/05/25 17:01 EST

## 2025-02-05 NOTE — PLAN OF CARE
"Goal Outcome Evaluation:     Palliative Care Team --Jimena VILLA visited pt this am. Documentation revealed pt had \"hypoglycemic\" episode this am.  He did report sleeping better last night and was able to use own CPAP.        Pt did admit did not eat dinner last night.  Documentation revealed pt accepting 100% breakfast this am.      PC will continue to follow.                                          "

## 2025-02-06 ENCOUNTER — READMISSION MANAGEMENT (OUTPATIENT)
Dept: CALL CENTER | Facility: HOSPITAL | Age: 89
End: 2025-02-06
Payer: MEDICARE

## 2025-02-06 VITALS
RESPIRATION RATE: 22 BRPM | HEART RATE: 91 BPM | WEIGHT: 200.18 LBS | SYSTOLIC BLOOD PRESSURE: 156 MMHG | BODY MASS INDEX: 32.17 KG/M2 | TEMPERATURE: 97.2 F | HEIGHT: 66 IN | OXYGEN SATURATION: 98 % | DIASTOLIC BLOOD PRESSURE: 95 MMHG

## 2025-02-06 PROBLEM — I50.21 ACUTE HFREF (HEART FAILURE WITH REDUCED EJECTION FRACTION): Status: ACTIVE | Noted: 2025-02-06

## 2025-02-06 LAB
ALBUMIN SERPL-MCNC: 4.1 G/DL (ref 3.5–5.2)
ANION GAP SERPL CALCULATED.3IONS-SCNC: 13.2 MMOL/L (ref 5–15)
BASOPHILS # BLD AUTO: 0.09 10*3/MM3 (ref 0–0.2)
BASOPHILS NFR BLD AUTO: 1 % (ref 0–1.5)
BUN SERPL-MCNC: 55 MG/DL (ref 8–23)
BUN/CREAT SERPL: 24.7 (ref 7–25)
CALCIUM SPEC-SCNC: 8.3 MG/DL (ref 8.6–10.5)
CHLORIDE SERPL-SCNC: 102 MMOL/L (ref 98–107)
CO2 SERPL-SCNC: 23.8 MMOL/L (ref 22–29)
CREAT SERPL-MCNC: 2.23 MG/DL (ref 0.76–1.27)
DEPRECATED RDW RBC AUTO: 53.4 FL (ref 37–54)
EGFRCR SERPLBLD CKD-EPI 2021: 27.8 ML/MIN/1.73
EOSINOPHIL # BLD AUTO: 0.11 10*3/MM3 (ref 0–0.4)
EOSINOPHIL NFR BLD AUTO: 1.3 % (ref 0.3–6.2)
ERYTHROCYTE [DISTWIDTH] IN BLOOD BY AUTOMATED COUNT: 15.6 % (ref 12.3–15.4)
GLUCOSE BLDC GLUCOMTR-MCNC: 130 MG/DL (ref 70–130)
GLUCOSE BLDC GLUCOMTR-MCNC: 147 MG/DL (ref 70–130)
GLUCOSE SERPL-MCNC: 95 MG/DL (ref 65–99)
HCT VFR BLD AUTO: 44 % (ref 37.5–51)
HGB BLD-MCNC: 14.1 G/DL (ref 13–17.7)
IMM GRANULOCYTES # BLD AUTO: 0.12 10*3/MM3 (ref 0–0.05)
IMM GRANULOCYTES NFR BLD AUTO: 1.4 % (ref 0–0.5)
LYMPHOCYTES # BLD AUTO: 1.18 10*3/MM3 (ref 0.7–3.1)
LYMPHOCYTES NFR BLD AUTO: 13.6 % (ref 19.6–45.3)
MCH RBC QN AUTO: 30 PG (ref 26.6–33)
MCHC RBC AUTO-ENTMCNC: 32 G/DL (ref 31.5–35.7)
MCV RBC AUTO: 93.6 FL (ref 79–97)
MONOCYTES # BLD AUTO: 0.86 10*3/MM3 (ref 0.1–0.9)
MONOCYTES NFR BLD AUTO: 9.9 % (ref 5–12)
NEUTROPHILS NFR BLD AUTO: 6.34 10*3/MM3 (ref 1.7–7)
NEUTROPHILS NFR BLD AUTO: 72.8 % (ref 42.7–76)
NRBC BLD AUTO-RTO: 0 /100 WBC (ref 0–0.2)
PHOSPHATE SERPL-MCNC: 3.2 MG/DL (ref 2.5–4.5)
PLATELET # BLD AUTO: 151 10*3/MM3 (ref 140–450)
PMV BLD AUTO: 11.2 FL (ref 6–12)
POTASSIUM SERPL-SCNC: 4.9 MMOL/L (ref 3.5–5.2)
RBC # BLD AUTO: 4.7 10*6/MM3 (ref 4.14–5.8)
SODIUM SERPL-SCNC: 139 MMOL/L (ref 136–145)
WBC NRBC COR # BLD AUTO: 8.7 10*3/MM3 (ref 3.4–10.8)

## 2025-02-06 PROCEDURE — 85025 COMPLETE CBC W/AUTO DIFF WBC: CPT | Performed by: NURSE PRACTITIONER

## 2025-02-06 PROCEDURE — 97116 GAIT TRAINING THERAPY: CPT

## 2025-02-06 PROCEDURE — 82948 REAGENT STRIP/BLOOD GLUCOSE: CPT | Performed by: INTERNAL MEDICINE

## 2025-02-06 PROCEDURE — 97535 SELF CARE MNGMENT TRAINING: CPT

## 2025-02-06 PROCEDURE — 97110 THERAPEUTIC EXERCISES: CPT

## 2025-02-06 PROCEDURE — 63710000001 INSULIN LISPRO (HUMAN) PER 5 UNITS: Performed by: INTERNAL MEDICINE

## 2025-02-06 PROCEDURE — 99239 HOSP IP/OBS DSCHRG MGMT >30: CPT | Performed by: NURSE PRACTITIONER

## 2025-02-06 PROCEDURE — 97530 THERAPEUTIC ACTIVITIES: CPT

## 2025-02-06 PROCEDURE — 99231 SBSQ HOSP IP/OBS SF/LOW 25: CPT | Performed by: PHYSICIAN ASSISTANT

## 2025-02-06 PROCEDURE — 80069 RENAL FUNCTION PANEL: CPT | Performed by: NURSE PRACTITIONER

## 2025-02-06 RX ORDER — FLUTICASONE PROPIONATE 50 MCG
2 SPRAY, SUSPENSION (ML) NASAL DAILY PRN
Start: 2025-02-06

## 2025-02-06 RX ORDER — ALLOPURINOL 100 MG/1
100 TABLET ORAL DAILY
Start: 2025-02-06

## 2025-02-06 RX ORDER — FUROSEMIDE 40 MG/1
40 TABLET ORAL DAILY
Qty: 90 TABLET | Refills: 3 | Status: SHIPPED | OUTPATIENT
Start: 2025-02-06

## 2025-02-06 RX ORDER — SACUBITRIL AND VALSARTAN 24; 26 MG/1; MG/1
1 TABLET, FILM COATED ORAL EVERY 12 HOURS
Qty: 180 TABLET | Refills: 3 | Status: SHIPPED | OUTPATIENT
Start: 2025-02-06

## 2025-02-06 RX ADMIN — APIXABAN 2.5 MG: 2.5 TABLET, FILM COATED ORAL at 09:36

## 2025-02-06 RX ADMIN — INSULIN LISPRO 2 UNITS: 100 INJECTION, SOLUTION INTRAVENOUS; SUBCUTANEOUS at 09:35

## 2025-02-06 RX ADMIN — INSULIN LISPRO 4 UNITS: 100 INJECTION, SOLUTION INTRAVENOUS; SUBCUTANEOUS at 12:11

## 2025-02-06 RX ADMIN — METOPROLOL SUCCINATE 25 MG: 25 TABLET, EXTENDED RELEASE ORAL at 09:36

## 2025-02-06 RX ADMIN — AMIODARONE HYDROCHLORIDE 200 MG: 200 TABLET ORAL at 09:36

## 2025-02-06 RX ADMIN — OXYCODONE HYDROCHLORIDE AND ACETAMINOPHEN 1000 MG: 500 TABLET ORAL at 09:36

## 2025-02-06 RX ADMIN — PRAVASTATIN SODIUM 40 MG: 20 TABLET ORAL at 09:36

## 2025-02-06 NOTE — CASE MANAGEMENT/SOCIAL WORK
Spoke to pot and daughter He is needing a portable oxygen tank brought to the hospital and to his apartment Pt daughter is reporting he needs more assistance She is going to speak to Kristal at [facility]  She is also going to see if he could transfer to [facility] aware that it would be private pay  faxed referral to them  Called [facility] to have portable tank delivered to his room

## 2025-02-06 NOTE — PLAN OF CARE
Goal Outcome Evaluation:  Plan of Care Reviewed With: patient        Progress: no change  Outcome Evaluation: Pt was received eob and is agreeable to OT tx. He reports he is very fatigued from little sleep over the past few days and feel this is attributing to his increased fatigue and difficulty with mobility. He transferred to standing with CGA using the Rwx and increased time. Pt ambulated 24ft to the bathroom with CGA using the Rwx and he was able to transfer to the commode with CGA and doff his pants with CGA, doffed his brief with CGA and required min A to doff his shoes. Pt requried increased time on the commode to rest prior to changing his brief and re-donning his shoes and pants. He was able to don a clean brief and his pants with CGA and increased time and requried min A to don his shoes due to fatigue and feeling SOA. He transferred to standing with CGA using the Rwx and he completed chago hygeine in standing with CGA. Pt ambulated 24ft back to the chair using the Rwx with CGA and he was positioned for comfort in the chair with all needs in reach. Pt hr elevated from 90s to 116bpm after activity engagement and APRN placed him on 3L following activity engagement.  Extensive discussion held regarding dc planning due to pt daughter concerns with his current lvl of function. Pt is expected to safely return to his [facility] with a first floor apartment preferred due to difficulty with stair navigation due to poor activity tolerance and he would benefit from more assistance at the [facility] versus living independently for higher level activities due to his CHF related fatigue with HH PT/OT follow-up at the [facility]. Cont current POC and progress pt as tolerates.

## 2025-02-06 NOTE — OUTREACH NOTE
Prep Survey      Flowsheet Row Responses   [facility] patient discharged from? [place]   Is LACE score < 7 ? No   Eligibility [facility]   Date of Admission 02/03/25   Date of Discharge 02/06/25   Discharge Disposition Home-Health Care Sv   Discharge diagnosis Atrial arrhythmia   Does the patient have one of the following disease processes/diagnoses(primary or secondary)? Other   Does the patient have Home health ordered? Yes   What is the Home health agency?  [facility]   Prep survey completed? Yes            Kesha ALSTON - Registered Nurse

## 2025-02-06 NOTE — DISCHARGE INSTRUCTIONS
Patient to be discharged home with home health.  Hold Lasix and Jardiance until Sunday.  Fluid restriction of 1.5 to 2 L/day.  Continue metoprolol and Eliquis.   Follow-up with cardiology as scheduled.  Follow-up with nephrology as scheduled.  Follow with PCP as scheduled.  Emergency department for any worsening symptoms.

## 2025-02-06 NOTE — PLAN OF CARE
Goal Outcome Evaluation:                 Daily Care Plan Summary: Heart Failure    Diuretic in use (IV or PO):   PO  Autodose: lasix held        Daily weight (up or down):    gain: 1 lbs      Output > Intake (yes/no):Yes      O2 Requirements (current, any change?):  3.5 liters/min via nasal cannula      Symptoms noted with Activity (Respiratory Tolerance, functional state):     SOA      Anticipated Discharge Plans:     potentially home health

## 2025-02-06 NOTE — PROGRESS NOTES
[facility] Progress Note  [facility]. [place]        Patient Name: Blane Dietz  : 1935  MRN: [id number]   LOS: 3 days    Patient Care Team:  David Em MD as PCP - General (Internal Medicine)  Ha Toussaint MD as Consulting Physician (Cardiology)  Shannan Ramon MD as Consulting Physician (Hematology and Oncology)  Xavier Boston MD as Consulting Physician (Endocrinology)  Brionna Weinstein DO as Surgeon (General Surgery)  Sophia Ferreira PA as Physician Assistant (Endocrinology)  Abbey Reyez APRN as Nurse Practitioner (Nurse Practitioner)    Chief Complaint:    Chief Complaint   Patient presents with    Shortness of Breath     Primary Care Physician:  David Em MD  Date of admission: 2/3/2025    Subjective     Interval History:   Follow-up acute on chronic kidney disease stage III .  Patient sitting on edge of bed, just finished breakfast.  He says he feels rough today because he did not sleep well.  He is just putting on his CPAP.  He denies chest pain shortness of breath nausea vomiting.  Events noted from last 24 hours.  I reviewed the chart and other providers notes, labs and procedures done since my last note.    Review of Systems:   As noted above.    Objective     Vitals:   Temp:  [97.2 °F (36.2 °C)-98 °F (36.7 °C)] 97.2 °F (36.2 °C)  Heart Rate:  [87-91] 91  Resp:  [18-22] 22  BP: (109-156)/(69-95) 156/95  Flow (L/min) (Oxygen Therapy):  [3-3.5] 3.5    Intake/Output Summary (Last 24 hours) at 2025 0807  Last data filed at 2025 0600  Gross per 24 hour   Intake 360 ml   Output 400 ml   Net -40 ml       Physical Exam:    General Appearance: alert, oriented x 3, no acute distress   Skin: warm and dry  HEENT: oral mucosa normal, nonicteric sclera  Neck: supple, no JVD  Lungs: Decreased breath sounds all over the chest, patient on CPAP  Heart: IRRR,   Abdomen: obese, soft, nontender, non distended and positive  bowel sounds.  : no palpable bladder  Extremities: 1-2+ BLE edema, bilateral lower legs are cold and erythematous, no cyanosis or clubbing  Neuro: normal speech and mental status     Scheduled Meds:     Current Facility-Administered Medications   Medication Dose Route Frequency Provider Last Rate Last Admin    acetaminophen (TYLENOL) tablet 650 mg  650 mg Oral Q4H PRN Emery Guzman, DO        [Held by provider] allopurinol (ZYLOPRIM) tablet 100 mg  100 mg Oral Daily Emery Guzman, DO   100 mg at 02/04/25 0825    amiodarone (PACERONE) tablet 200 mg  200 mg Oral Daily Emery Guzman, DO   200 mg at 02/05/25 0925    apixaban (ELIQUIS) tablet 2.5 mg  2.5 mg Oral Q12H Emery Guzman, DO   2.5 mg at 02/05/25 2039    ascorbic acid (VITAMIN C) tablet 1,000 mg  1,000 mg Oral Daily Emery Guzman, DO   1,000 mg at 02/05/25 0924    sennosides-docusate (PERICOLACE) 8.6-50 MG per tablet 2 tablet  2 tablet Oral BID PRN Emery Guzman, DO        And    polyethylene glycol (MIRALAX) packet 17 g  17 g Oral Daily PRN Emery Guzman, DO        And    bisacodyl (DULCOLAX) EC tablet 5 mg  5 mg Oral Daily PRN Emery Guzman, DO        And    bisacodyl (DULCOLAX) suppository 10 mg  10 mg Rectal Daily PRN Emery Guzman, DO        calcium carbonate (TUMS) chewable tablet 500 mg (200 mg elemental)  2 tablet Oral BID PRN Emery Guzman, DO        Calcium Replacement - Follow Nurse / BPA Driven Protocol   Not Applicable PRN Emery Guzman, DO        dextrose (D50W) (25 g/50 mL) IV injection 10-50 mL  10-50 mL Intravenous Q15 Min PRN Emery Guzman, DO   50 mL at 02/05/25 0827    dextrose (GLUTOSE) oral gel 15 g  15 g Oral Q15 Min PRN Emery Guzman, DO        [Held by provider] empagliflozin (JARDIANCE) tablet 25 mg  25 mg Oral Daily Emery Guzman, DO   25 mg at 02/04/25 0825    [Held by provider] furosemide (LASIX) tablet 40 mg  40 mg Oral BID Emery Guzman DO    40 mg at 02/04/25 0825    Glucagon (GLUCAGEN) injection 1 mg  1 mg Intramuscular Q15 Min PRN Emery Guzman,         insulin glargine (LANTUS, SEMGLEE) injection 1-200 Units  1-200 Units Subcutaneous Nightly - Glucommander Emery Guzman, DO   8 Units at 02/05/25 2039    insulin lispro (humaLOG) injection 1-200 Units  1-200 Units Subcutaneous 4x Daily With Meals & Nightly Emery Guzman, DO   2 Units at 02/05/25 1824    insulin lispro (humaLOG) injection 1-200 Units  1-200 Units Subcutaneous PRN Emery Guzman, DO        Magnesium Standard Dose Replacement - Follow Nurse / BPA Driven Protocol   Not Applicable PRN Emery Guzman,         metoprolol succinate XL (TOPROL-XL) 24 hr tablet 25 mg  25 mg Oral Daily Emery Guzman, DO   25 mg at 02/05/25 0925    metoprolol tartrate (LOPRESSOR) injection 2.5 mg  2.5 mg Intravenous Q5 Min PRN Deanne Bowles MD        nitroglycerin (NITROSTAT) SL tablet 0.4 mg  0.4 mg Sublingual Q5 Min PRN Emery Guzman,         ondansetron ODT (ZOFRAN-ODT) disintegrating tablet 4 mg  4 mg Oral Q6H PRN Emery Guzman DO        Or    ondansetron (ZOFRAN) injection 4 mg  4 mg Intravenous Q6H PRN Emery Guzman DO        Phosphorus Replacement - Follow Nurse / BPA Driven Protocol   Not Applicable PRN Emery Guzman DO        Potassium Replacement - Follow Nurse / BPA Driven Protocol   Not Applicable PRN Emery Guzman DO        pravastatin (PRAVACHOL) tablet 40 mg  40 mg Oral Daily Emery Guzman, DO   40 mg at 02/05/25 0924    sodium chloride 0.9 % flush 10 mL  10 mL Intravenous PRN Emery Guzman,         sodium chloride 0.9 % flush 10 mL  10 mL Intravenous Q12H Emery Guzman, DO   10 mL at 02/05/25 2039    sodium chloride 0.9 % flush 10 mL  10 mL Intravenous PRN Emery Guzman,         sodium chloride 0.9 % infusion 40 mL  40 mL Intravenous PRN Emery Guzman DO           [Held by provider]  "allopurinol, 100 mg, Oral, Daily  amiodarone, 200 mg, Oral, Daily  apixaban, 2.5 mg, Oral, Q12H  ascorbic acid, 1,000 mg, Oral, Daily  [Held by provider] empagliflozin, 25 mg, Oral, Daily  [Held by provider] furosemide, 40 mg, Oral, BID  insulin glargine, 1-200 Units, Subcutaneous, Nightly - Glucommander  insulin lispro, 1-200 Units, Subcutaneous, 4x Daily With Meals & Nightly  metoprolol succinate XL, 25 mg, Oral, Daily  pravastatin, 40 mg, Oral, Daily  sodium chloride, 10 mL, Intravenous, Q12H        IV Meds:        Results Reviewed:   I have personally reviewed the results from the time of this admission to 2/6/2025 08:07 EST     Results from last 7 days   Lab Units 02/06/25 0547 02/05/25 0640 02/04/25 0553 02/03/25  0422   SODIUM mmol/L 139 140 139 138   POTASSIUM mmol/L 4.9 4.6 5.3* 5.4*   CHLORIDE mmol/L 102 105 104 104   CO2 mmol/L 23.8 19.2* 22.2 18.9*   BUN mg/dL 55* 48* 40* 30*   CREATININE mg/dL 2.23* 1.85* 2.05* 1.55*   CALCIUM mg/dL 8.3* 7.7* 8.0* 7.9*   BILIRUBIN mg/dL  --   --  1.5* 1.8*   ALK PHOS U/L  --   --  132* 125*   ALT (SGPT) U/L  --   --  19 15   AST (SGOT) U/L  --   --  38 32   GLUCOSE mg/dL 95 56* 110* 218*       Estimated Creatinine Clearance: 24.6 mL/min (A) (by C-G formula based on SCr of 2.23 mg/dL (H)).    Results from last 7 days   Lab Units 02/06/25 0547   PHOSPHORUS mg/dL 3.2             Results from last 7 days   Lab Units 02/06/25 0547 02/05/25 0640 02/04/25  0553 02/03/25  0422   WBC 10*3/mm3 8.70 7.03 7.76 7.93   HEMOGLOBIN g/dL 14.1 13.8 13.4 14.3   PLATELETS 10*3/mm3 151 101* 118* 108*             Brief Urine Lab Results  (Last result in the past 365 days)        Color   Clarity   Blood   Leuk Est   Nitrite   Protein   CREAT   Urine HCG        04/04/24 1502             10                 No results found for: \"UTPCR\"    Imaging Results (Last 24 Hours)       ** No results found for the last 24 hours. 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  Currently 27.8.  Cardiorenal syndrome, diabetes as causative agents.  Continue to optimize volume status.  Acute on chronic CHF: Patient with fluid overload, need for diuretics will be assessed on daily basis.  Hypertension: Blood pressure is on the low side, medications on hold likely will improve.  Atrial arrhythmia: Noncompliant with beta-blocker, improved on metoprolol  Type 2 diabetes: Last A1c on record was 9.2% on 7/25/2024.  PVD: Bilateral legs are erythematous and cold to touch.  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      PLAN:  Patient fairly adamant wants to go home today.  He said he will set up a follow-up within a week.  If he goes home we will not restart his Jardiance and Lasix for 48 hours and then he can resume those 2 medications.  Details were discussed with the patient no family in the room.    Details were also discussed with the hospitalist service and or other providers as needed.  Follow-up with me in 1 week.  Continue with rest of the current treatment plan, and monitor with surveillance labs.  Further recommendations will depend on clinical course of the patient during the current hospitalization.   I have reviewed the copied text to this note, it was edited and the changes made as needed.  It is accurate to the point, when the note was signed today.     Thank you for involving us in the care of Blane Dietz.  Please feel free to call with any questions.    Andriy Foote MD, FASN  02/06/25  08:07 [facility]    [facility]  [phone]      Part of this note may be an electronic transcription/translation of spoken language to printed text using the Dragon Dictation System.

## 2025-02-06 NOTE — THERAPY TREATMENT NOTE
Patient Name: Blane Dietz  : 1935    MRN: [id number]                              Today's Date: 2025       Admit Date: 2/3/2025    Visit Dx:     ICD-10-CM ICD-9-CM   1. Atrial fibrillation with rapid ventricular response  I48.91 427.31   2. Other hypervolemia  E87.79 276.69   3. Viral upper respiratory tract infection  J06.9 465.9   4. Acute HFrEF (heart failure with reduced ejection fraction)  I50.21 428.21     Patient Active Problem List   Diagnosis    Essential hypertension    Type 2 diabetes mellitus with hyperglycemia, with long-term current use of insulin    Prostate cancer    Aortic stenosis    Sleep apnea    S/P AVR    HLD (hyperlipidemia)    Renal mass    s/p right nepheroureterectomy and adrenalectomy     Diffuse large B-cell lymphoma of solid organ excluding spleen    PICC (peripherally inserted central catheter) flush    History of pulmonary embolism    History of malignant neoplasm of prostate    Lymphoma of kidney    Stage 3b chronic kidney disease    Pneumonia due to COVID-19 virus    Abrasion    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)    Valvular heart disease    Atrial arrhythmia    RBBB    Atrial fibrillation with rapid ventricular response    Acute HFrEF (heart failure with reduced ejection fraction)     Past Medical History:   Diagnosis Date    Aortic stenosis     status post ROSS procedure, remote, with 2015 echocardiography revealing normal aortic and pulmonary valve function, normal ejection fraction and mild to moderate MR    Arthritis     Bilateral impacted cerumen 2016    Bronchitis     CHF (congestive heart failure)     Chronic gout of right foot 2016    Impression: 2016 - stable.;     COVID-19 vaccine series completed 2021    Maderna 2nd dose 3/12/21.    Depression     Diabetes mellitus     Diverticulitis     Exogenous obesity     Gout     Heart murmur     History of vitamin D deficiency     Hypercholesteremia  08/11/2016    Hyperlipidemia     Hypertension     Impaired mobility     Kidney lesion     Malignant neoplasm of prostate     Morbid obesity 08/11/2016    Pneumonia 05/12/2021    Primary osteoarthritis involving multiple joints 06/13/2016    Impression: 03/08/2016 - stable;     Pulmonary embolism     Sleep apnea 05/12/2021    C-Pap    Uncontrolled type 2 diabetes mellitus with hyperglycemia 06/13/2016    Impression: 03/08/2016 - seeing Endo .;     Vertigo      Past Surgical History:   Procedure Laterality Date    CARDIAC VALVE REPLACEMENT  05/12/2021    Ross procedure 22 years ago    COLON SURGERY      COLONOSCOPY      COLOSTOMY  1999    COLOSTOMY REVISION      CYSTOSCOPY N/A 12/7/2021    Procedure: CYSTOSCOPY FLEXIBLE;  Surgeon: José Miguel Villafuerte Jr., MD;  Location:  [facility];  Service: Urology;  Laterality: N/A;    EXPLORATORY LAPAROTOMY      With Tissue Removal    LIPOMA EXCISION      MOHS SURGERY      NEPHROURETERECTOMY Right 12/7/2021    Procedure: RIGHT NEPHROURETERECTOMY LAPAROSCOPIC WITH DAVINCI ROBOT;  Surgeon: José Miguel Villafuerte Jr., MD;  Location:  [facility];  Service: Robotics - DaVinci;  Laterality: Right;    OTHER SURGICAL HISTORY      Porcine Valve    PROSTATE SURGERY      PROSTATECTOMY  2000     History of Prostatectomy Perineal Radical    SKIN CANCER EXCISION      from head and lip    TONSILLECTOMY        General Information       Row Name 02/06/25 1537          Physical Therapy Time and Intention    Document Type therapy note (daily note)  -MS     Mode of Treatment co-treatment;physical therapy;occupational therapy  -MS       Row Name 02/06/25 1537          General Information    Patient Profile Reviewed yes  -MS               User Key  (r) = Recorded By, (t) = Taken By, (c) = Cosigned By      Initials Name Provider Type    Wang Lopez PT Physical Therapist                   Mobility       Row Name 02/06/25 1537          Sit-Stand Transfer    Sit-Stand [place] (Transfers) contact  guard  -MS     Assistive Device (Sit-Stand Transfers) walker, front-wheeled  -MS       Row Name 02/06/25 1537          Gait/Stairs (Locomotion)    [place] Level (Gait) contact guard;1 person assist  -MS     Assistive Device (Gait) walker, front-wheeled  -MS     Patient was able to Ambulate yes  -MS     Distance in Feet (Gait) 24  x2  -MS     Deviations/Abnormal Patterns (Gait) bilateral deviations;binta decreased;gait speed decreased;stride length decreased  -MS     Bilateral Gait Deviations forward flexed posture  -MS               User Key  (r) = Recorded By, (t) = Taken By, (c) = Cosigned By      Initials Name Provider Type    Wang Lopez, PT Physical Therapist                   Obj/Interventions       Row Name 02/06/25 1537          Range of Motion Comprehensive    General Range of Motion bilateral lower extremity ROM WFL  -MS       Row Name 02/06/25 1537          Strength Comprehensive (MMT)    General Manual Muscle Testing (MMT) Assessment no strength deficits identified  -MS       Row Name 02/06/25 1537          Balance    Static Sitting Balance standby assist  -MS     Dynamic Sitting Balance standby assist  -MS     Position, Sitting Balance unsupported;sitting edge of bed  -MS     Static Standing Balance contact guard  -MS     Dynamic Standing Balance contact guard  -MS     Position/Device Used, Standing Balance unsupported;walker, front-wheeled  -MS               User Key  (r) = Recorded By, (t) = Taken By, (c) = Cosigned By      Initials Name Provider Type    Wang Lopez, PT Physical Therapist                   Goals/Plan    No documentation.                  Clinical Impression       Row Name 02/06/25 1538          Pain    Pretreatment Pain Rating 0/10 - no pain  -MS     Posttreatment Pain Rating 0/10 - no pain  -MS       Row Name 02/06/25 1538          Plan of Care Review    Plan of Care Reviewed With patient  -MS     Progress no change  -MS     Outcome Evaluation Pt participated  in PT tx this date. Pt was sitting EOB, able to t/f to standing with Rwx and CGA. Pt ambulated 24 ft with CGA and Rwx x2.  Pt required increased time for all mobility due to fatigue. Pt reports he thinks it will improve with rest. Discussed with Pt and daughter regarding DCP, daughter has concerns with Pt needing increased assistance. Pt would benefit from more assistance from retirement with higher level activity and more frequent check ins. Pt would benefit from HHPT.  -MS       Row Name 02/06/25 1538          Therapy Assessment/Plan (PT)    Rehab Potential (PT) good  -MS     Criteria for Skilled Interventions Met (PT) --  -MS     Therapy Frequency (PT) --  -MS       Row Name 02/06/25 1538          Vital Signs    Pretreatment Heart Rate (beats/min) 90  -MS     Intratreatment Heart Rate (beats/min) 116  -MS     Posttreatment Heart Rate (beats/min) 93  -MS     Pre SpO2 (%) 98  -MS     O2 Delivery Pre Treatment supplemental O2  -MS     Intra SpO2 (%) 95  -MS     O2 Delivery Intra Treatment supplemental O2  -MS     Post SpO2 (%) 97  -MS     O2 Delivery Post Treatment supplemental O2  -MS     Pre Patient Position Sitting  -MS     Intra Patient Position Standing  -MS     Post Patient Position Sitting  -MS       Row Name 02/06/25 1538          Positioning and Restraints    Pre-Treatment Position in bed  -MS     Post Treatment Position chair  -MS     In Chair sitting;call light within reach;encouraged to call for assist;notified nsg;with family/caregiver  -MS               User Key  (r) = Recorded By, (t) = Taken By, (c) = Cosigned By      Initials Name Provider Type    MS Wang Holley, PT Physical Therapist                   Outcome Measures       Row Name 02/06/25 1544 02/06/25 0800       How much help from another person do you currently need...    Turning from your back to your side while in flat bed without using bedrails? 4  -MS 4  -HH    Moving from lying on back to sitting on the side of a flat bed without bedrails?  4  -MS 4  -HH    Moving to and from a bed to a chair (including a wheelchair)? 4  -MS 4  -HH    Standing up from a chair using your arms (e.g., wheelchair, bedside chair)? 4  -MS 4  -HH    Climbing 3-5 steps with a railing? 3  -MS 2  -HH    To walk in hospital room? 3  -MS 3  -HH    AM-PAC 6 Clicks Score (PT) 22  -MS 21  -HH    Highest Level of Mobility Goal 7 --> Walk 25 feet or more  -MS 6 --> Walk 10 steps or more  -HH      Row Name 02/06/25 1537          Functional Assessment    Outcome Measure Options AM-PAC 6 Clicks Daily Activity (OT)  -SP               User Key  (r) = Recorded By, (t) = Taken By, (c) = Cosigned By      Initials Name Provider Type    MS Wang Holley, PT Physical Therapist    SP Sophia Saunders, OT Occupational Therapist    HH Kirstin Thomas, RN Registered Nurse                                 Physical Therapy Education       Title: PT OT SLP Therapies (Resolved)       Topic: Physical Therapy (Resolved)       Point: Mobility training (Resolved)       Learning Progress Summary            Patient Acceptance, E,TB, VU by  at 2/4/2025 2573    Comment: Pt trained in functional activities, proper usage of walker, and PT POC.                      Point: Home exercise program (Resolved)       Learner Progress:  Not documented in this visit.              Point: Body mechanics (Resolved)       Learner Progress:  Not documented in this visit.              Point: Precautions (Resolved)       Learner Progress:  Not documented in this visit.                              User Key       Initials Effective Dates Name Provider Type Discipline     01/13/25 -  Farhad Kaur, PT Student PT Student PT                  PT Recommendation and Plan     Progress: no change  Outcome Evaluation: Pt participated in PT tx this date. Pt was sitting EOB, able to t/f to standing with Rwx and CGA. Pt ambulated 24 ft with CGA and Rwx x2.  Pt required increased time for all mobility due to fatigue. Pt reports he thinks it will  improve with rest. Discussed with Pt and daughter regarding DCP, daughter has concerns with Pt needing increased assistance. Pt would benefit from more assistance from halfway with higher level activity and more frequent check ins. Pt would benefit from HHPT.     Time Calculation:         PT Charges       Row Name 02/06/25 1547             Time Calculation    Start Time 1348  -MS      Stop Time 1429  -MS      Time Calculation (min) 41 min  -MS      PT Received On 02/06/25  -MS         Timed Charges    47515 - PT Therapeutic Exercise Minutes 25  -MS      26938 - Gait Training Minutes  16  -MS         Total Minutes    Timed Charges Total Minutes 41  -MS       Total Minutes 41  -MS                User Key  (r) = Recorded By, (t) = Taken By, (c) = Cosigned By      Initials Name Provider Type    Wang Lopez, PT Physical Therapist                  Therapy Charges for Today       Code Description Service Date Service Provider Modifiers Qty    92086026957 HC PT THER PROC EA 15 MIN 2/6/2025 Wang Holley, PT GP 2    29996861583 HC GAIT TRAINING EA 15 MIN 2/6/2025 aWng Holley, PT GP 1            PT G-Codes  Outcome Measure Options: AM-PAC 6 Clicks Daily Activity (OT)  AM-PAC 6 Clicks Score (PT): 22  AM-PAC 6 Clicks Score (OT): 21  PT Discharge Summary  Anticipated Discharge Disposition (PT): home with home health    Wang Holley PT  2/6/2025

## 2025-02-06 NOTE — PLAN OF CARE
Goal Outcome Evaluation:  Plan of Care Reviewed With: patient        Progress: no change  Outcome Evaluation: Pt participated in PT tx this date. Pt was sitting EOB, able to t/f to standing with Rwx and CGA. Pt ambulated 24 ft with CGA and Rwx x2.  Pt required increased time for all mobility due to fatigue. Pt reports he thinks it will improve with rest. Discussed with Pt and daughter regarding DCP, daughter has concerns with Pt needing increased assistance. Pt would benefit from more assistance from [facility] with higher level activity and more frequent check ins. Pt would benefit from HHPT.    Anticipated Discharge Disposition (PT): home with home health

## 2025-02-06 NOTE — DISCHARGE SUMMARY
[facility]   DISCHARGE SUMMARY      Name:  Blane Dietz   Age:  89 y.o.  Sex:  male  :  1935  MRN:  [id number]   Visit Number:  [id number]    Admission Date:  2/3/2025  Date of Discharge:  2025  Primary Care Physician:  David Em MD    Important issues to note:    Heart failure/respiratory failure  -Likely exacerbated by atrial flutter with RVR  -Continue goal-directed medical therapy as tolerated.  Unable to restart Entresto or Jardiance at this time.  Patient can restart Jardiance on .  -Nephrology consulted for diuresis recommendations.  Will follow-up with nephrology outpatient in 1 week.  Continue Lasix on  as well.     Atrial flutter/valvular heart disease  -Follows with Dr. Toussaint.  -Continue Eliquis/metoprolol/amiodarone.  -Status post Ross procedure for history of aortic stenosis.  -Consider ablation and upon follow-up with outpatient cardiology.     -Home health ordered upon discharge.  -Patient did note worsening of creatinine this morning.  Patient adamant about being discharged today.  He will have close follow-up with nephrology/PCP/cardiology.  -Strict return precautions given.    -Addendum 1430-called to bedside with daughter having multiple concerns about patient returning back to [facility].  Patient currently alert and oriented x 3.  Patient very adamant about discharging back to [facility] at this time.  Patient denies short-term rehab currently.  He was noted to ambulate 60 feet with PT during admission.  Case management called to bedside to help assist answer further questions.    Discharge Diagnoses:     Acute on chronic systolic heart failure with reduced EF, POA  Acute on chronic hypoxic respiratory failure secondary to above, POA  Atrial flutter with RVR, POA  Valvular heart disease status post Ross procedure  Acute kidney injury on chronic kidney disease stage IIIb  Diabetes mellitus type 2  History of B-cell  lymphoma  History of prostate cancer/renal cell carcinoma status post nephrectomy  Impaired mobility and ADLs    Problem List:     Active Hospital Problems    Diagnosis  POA    **Atrial arrhythmia [I49.8]  Yes    Acute HFrEF (heart failure with reduced ejection fraction) [I50.21]  Yes    RBBB [I45.10]  Yes    Atrial fibrillation with rapid ventricular response [I48.91]  Yes    Valvular heart disease [I38]  Yes    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) [I50.23]  Yes    Stage 3b chronic kidney disease [N18.32]  Yes    Type 2 diabetes mellitus with hyperglycemia, with long-term current use of insulin [E11.65, Z79.4]  Not Applicable      Resolved Hospital Problems   No resolved problems to display.     Presenting Problem:    Chief Complaint   Patient presents with    Shortness of Breath      Consults:     Consulting Physician(s)         Provider   Role Specialty     Andriy Foote MD, LOAN      Consulting Physician Nephrology     Bonifacio Ortiz MD      Consulting Physician Cardiology          Procedures Performed:        History of presenting illness/Hospital Course:    Mr. Dietz is an 89-year-old male with pertinent past medical history of atrial fibrillation on Eliquis, chronic kidney disease stage III, B-cell lymphoma, hypertension, hyperlipidemia, prior Ross procedure due to aortic stenosis, chronic systolic heart failure, chronic oxygen dependence at 2 L, sleep apnea, renal carcinoma status post nephrectomy, who presented to emergency department due to shortness of air.  Patient discontinued his Eliquis due to subconjunctival hematoma.  Patient had not been taking metoprolol at home due to not feeling well.     Upon ED presentation patient heart rate noted to be 161, requiring 4 L nasal cannula in order to maintain appropriate saturations.  Pertinent labs and imaging noted Highly sensitive troponin 47, proBNP 11,175, potassium 5.4, anion gap 15.1, creatinine 1.55, total bilirubin  1.8, WBC 7.93, COVID and flu negative, chest x-ray shows sternotomy wires bilateral effusions right greater than left.  Hospitalist consulted for further medical management.  Cardiology also consulted for recommendations.  Per cardiology suspects flutter with rapid ventricular rates exacerbating CHF.  Patient to continue metoprolol, consider restarting Jardiance and Entresto as kidney function allows.  Lasix was continued.  Advised to restart Eliquis for CVA prophylaxis and consider referral for ablation upon follow-up with outpatient cardiologist.  Also due to valvular heart disease patient will need to follow-up with established outpatient cardiologist as recent echo also noting moderate aortic insufficiency and mitral regurgitation.  Unfortunately patient unable to tolerate Jardiance at this time due to worsening kidney function.  Nephrology consulted for diuresis assistance.  Patient given further doses of IV Lasix with overall edema and shortness of air improved.  Unfortunately kidney function did worsen.  He will continue holding Lasix and Jardiance until Sunday.  He will follow closely with nephrology/cardiology/PCP.  Home health ordered upon discharge.  Patient requiring 2 L nasal cannula which she has at home.  Strict return precautions given.    Vital Signs:    Temp:  [97.2 °F (36.2 °C)-98 °F (36.7 °C)] 97.2 °F (36.2 °C)  Heart Rate:  [87-91] 91  Resp:  [18-22] 22  BP: (109-156)/(69-95) 156/95    Physical Exam:    General Appearance:  Alert and cooperative.  Chronically ill elderly male.   Head:  Atraumatic and normocephalic.   Eyes: Conjunctivae and sclerae normal, no icterus. No pallor.   Ears:  Ears with no abnormalities noted.   Throat: No oral lesions, no thrush, oral mucosa moist.   Neck: Supple, trachea midline, no thyromegaly.   Back:   No kyphoscoliosis present. No tenderness to palpation.   Lungs:   Breath sounds heard bilaterally equally.  No crackles or wheezing. No Pleural rub or bronchial  breathing.  On 2 L unlabored.   Heart:  Normal S1 and S2, no murmur, no gallop, no rub. No JVD.   Abdomen:   Normal bowel sounds, no masses, no organomegaly. Soft, nontender, nondistended, no rebound tenderness.   Extremities: Supple, no edema, no cyanosis, no clubbing.  Chronic bilateral lower extremity venous stasis.   Pulses: Pulses palpable bilaterally.   Skin: No bleeding or rash.   Neurologic: Alert and oriented x 3. No facial asymmetry. Moves all four limbs. No tremors.  Generalized weakness.     Pertinent Lab Results:     Results from last 7 days   Lab Units 02/06/25  0547 02/05/25  0640 02/04/25  0553 02/03/25  0422   SODIUM mmol/L 139 140 139 138   POTASSIUM mmol/L 4.9 4.6 5.3* 5.4*   CHLORIDE mmol/L 102 105 104 104   CO2 mmol/L 23.8 19.2* 22.2 18.9*   BUN mg/dL 55* 48* 40* 30*   CREATININE mg/dL 2.23* 1.85* 2.05* 1.55*   CALCIUM mg/dL 8.3* 7.7* 8.0* 7.9*   BILIRUBIN mg/dL  --   --  1.5* 1.8*   ALK PHOS U/L  --   --  132* 125*   ALT (SGPT) U/L  --   --  19 15   AST (SGOT) U/L  --   --  38 32   GLUCOSE mg/dL 95 56* 110* 218*     Results from last 7 days   Lab Units 02/06/25  0547 02/05/25  0640 02/04/25  0553   WBC 10*3/mm3 8.70 7.03 7.76   HEMOGLOBIN g/dL 14.1 13.8 13.4   HEMATOCRIT % 44.0 42.9 42.8   PLATELETS 10*3/mm3 151 101* 118*         Results from last 7 days   Lab Units 02/03/25  0615 02/03/25  0422   HSTROP T ng/L 47* 47*     Results from last 7 days   Lab Units 02/03/25  0422   PROBNP pg/mL 11,175.0*                       Pertinent Radiology Results:    Imaging Results (All)       Procedure Component Value Units Date/Time    XR Chest 1 View [[id number]] Collected: 02/03/25 0749     Updated: 02/03/25 0754    Narrative:      PROCEDURE: XR CHEST 1 VW-     HISTORY: SOA Triage Protocol, worsening shortness of breath for 1 day.     COMPARISON: None.     FINDINGS: The heart is enlarged, stable.. Again noted are bilateral  pleural effusions, right greater than left; these are stable. Pulmonary  vascular  congestion and bilateral interstitial disease worse in the lung  bases also appears stable. Bibasilar atelectasis or infiltrate is  stable.. Again noted is prominence of the right paratracheal region  secondary to a dilated ascending aorta as shown on CT chest of  12/21/2024; this is stable.. There is no pneumothorax.  There are no  acute osseous abnormalities. Status post median sternotomy. Apical  lordotic positioning noted. There is evidence of old calcified  granulomatous disease.       Impression:      Stable appearance of CHF compared to prior. Recommend  follow-up..              This report was signed and finalized on 2/3/2025 7:52 AM by Di Ayon MD.               Echo:    Results for orders placed during the hospital encounter of 12/21/24    Adult Transthoracic Echo Complete W/ Cont if Necessary Per Protocol    Interpretation Summary    Left ventricular systolic function is moderately decreased. Calculated left ventricular EF = 38.2% Left ventricular ejection fraction appears to be 36 - 40%.    The left ventricular cavity is mildly dilated.    Left ventricular diastolic dysfunction is noted.    Mildly reduced right ventricular systolic function noted.    The right ventricular cavity is mildly dilated.    The left atrial cavity is dilated.    The right atrial cavity is dilated.    Moderate aortic valve regurgitation is present.  Patient has a history of Ross procedure.    Moderate mitral valve regurgitation is present.    Estimated right ventricular systolic pressure from tricuspid regurgitation is normal (<35 mmHg).    Condition on Discharge:      Stable.    Code status during the hospital stay:    Code Status and Medical Interventions: CPR (Attempt to Resuscitate); Full Support   Ordered at: 02/03/25 0647     Code Status (Patient has no pulse and is not breathing):    CPR (Attempt to Resuscitate)     Medical Interventions (Patient has pulse or is breathing):    Full Support     Discharge  "Disposition:    Home-Health Care [facility] [place]    Discharge Medications:       Discharge Medications        Changes to Medications        Instructions Start Date   allopurinol 100 MG tablet  Commonly known as: ZYLOPRIM  What changed: additional instructions   100 mg, Oral, Daily, Hold for now.  Can restart Sunday.      empagliflozin 25 MG tablet tablet  Commonly known as: Jardiance  What changed: additional instructions   25 mg, Oral, Daily, Hold for now.  Can restart Sunday.      Entresto 24-26 MG tablet  Generic drug: sacubitril-valsartan  What changed: additional instructions   1 tablet, Oral, Every 12 Hours, Hold for now- kidneys unable to tolerate      furosemide 40 MG tablet  Commonly known as: LASIX  What changed: additional instructions   40 mg, Oral, Daily, Hold for now.  Can restart Sunday.      Lantus 100 UNIT/ML injection  Generic drug: insulin glargine  What changed: additional instructions   Inject up to 50 units daily subcutaneously             Continue These Medications        Instructions Start Date   amiodarone 200 MG tablet  Commonly known as: PACERONE   200 mg, Daily      apixaban 2.5 MG tablet tablet  Commonly known as: ELIQUIS   2.5 mg, Oral, Every 12 Hours Scheduled      ascorbic acid 1000 MG tablet  Commonly known as: VITAMIN C   1,000 mg, Daily      Calcium Carb-Cholecalciferol 600-5 MG-MCG tablet   1 tablet, Oral, Every Other Day      CINNAMON PO   1 tablet, Daily      Droplet Insulin Syringe 31G X 5/16\" 0.5 ML misc  Generic drug: Insulin Syringe-Needle U-100       fluticasone 50 MCG/ACT nasal spray  Commonly known as: Flonase   2 sprays, Nasal, Daily PRN      metoprolol succinate XL 25 MG 24 hr tablet  Commonly known as: TOPROL-XL   25 mg, Oral, Daily      pravastatin 40 MG tablet  Commonly known as: PRAVACHOL   TAKE ONE TABLET BY MOUTH EVERY NIGHT AT BEDTIME      PREVAGEN PO   Take  by mouth.      True Metrix Blood Glucose Test test strip  Generic drug: glucose blood   Use to test blood sugar 3 " times daily.  Dx E11.65      VITAMIN D (CHOLECALCIFEROL) PO   1,000 Units, Daily      Xtandi 40 MG tablet tablet  Generic drug: enzalutamide   Take 3 tablets by mouth Daily.             Stop These Medications      ciclopirox 1 % shampoo  Commonly known as: LOPROX     fluocinonide 0.05 % external solution  Commonly known as: LIDEX     ketoconazole 2 % shampoo  Commonly known as: NIZORAL     multivitamin with minerals tablet tablet     potassium chloride 10 MEQ CR tablet     triamcinolone 0.1 % cream  Commonly known as: KENALOG     Trulicity 3 MG/0.5ML solution pen-injector  Generic drug: Dulaglutide            Discharge Diet:     Diet Instructions       Diet: Cardiac Diets, Diabetic Diets, Renal Diets, Fluid Restriction (240 mL/tray) Diets; Healthy Heart (2-3 Na+); Thin (IDDSI 0); Consistent Carbohydrate; Low Potassium, Low Phosphorus, Low Sodium (2-3g); 2000 mL/day      Discharge Diet:  Cardiac Diets  Diabetic Diets  Renal Diets  Fluid Restriction (240 mL/tray) Diets       Cardiac Diet: Healthy Heart (2-3 Na+)    Fluid Consistency: Thin (IDDSI 0)    Diabetic Diet: Consistent Carbohydrate    Renal Diet:  Low Potassium  Low Phosphorus  Low Sodium (2-3g)       Fluid Restriction Diet (240 mL/tray): 2000 mL/day          Activity at Discharge:     Activity Instructions       Activity as Tolerated            Follow-up Appointments:    Additional Instructions for the Follow-ups that You Need to Schedule       Ambulatory Referral to Home Health (Hospital)   As directed      Face to Face Visit Date: 2/6/2025   Follow-up provider for Plan of Care?: I treated the patient in an acute care facility and will not continue treatment after discharge.   Follow-up provider: JOSE CHAUDHARY [[id number]]   Reason/Clinical Findings: debility   Describe mobility limitations that make leaving home difficult: debility   Nursing/Therapeutic Services Requested: Physical Therapy Occupational Therapy Skilled Nursing   Skilled nursing orders: CHF  management               Follow-up Information       Andriy Foote MD, FASN Follow up in 1 week(s).    Specialty: Nephrology  Why: Office to call patient with an appointment  Contact information:  [address] [place] [id number] [phone]                           Future Appointments   Date Time Provider Department Center   2/13/2025  2:00 PM Abbey Reyez APRN  [facility]   2/18/2025 11:15 AM Jeniffer Bailey PA-C [facility]   2/26/2025  1:30 PM TREATMENT RM 5 BH [facility] OP INFUS  [facility]   3/26/2025  1:30 PM TREATMENT RM 11 [facility] OP INFUS  [facility] OPI [facility]   4/24/2025  4:00 PM David Em MD [facility]   5/22/2025  1:30 PM Sophia Ferreira PA [facility]     Test Results Pending at Discharge:    Pending Results       None                 HIPOLITO Thomas  02/06/25  11:01 EST    Time: I spent >30 minutes on this discharge activity which included: face-to-face encounter with the patient, reviewing the data in the system, coordination of the care with the nursing staff as well as consultants, documentation, and entering orders.     Dictated utilizing Dragon dictation.

## 2025-02-06 NOTE — DISCHARGE PLACEMENT REQUEST
"Rc Blandon \"CHEO\" (89 y.o. Male)       Date of Birth   1935    Social Security Number       Address   [address]  [place] [id number]    Home Phone   [phone]    MRN   [id number]       [facility] orthodox   [facility]    Marital Status   Single                            Admission Date   2/3/25    Admission Type   Emergency    Admitting Provider   Deanne Bowles MD    Attending Provider   Deanne Bowles MD    Department, Room/Bed   [facility] TELEMETRY 4, 430/1       Discharge Date       Discharge Disposition   [place] [facility] [place]    [place]charge Destination                                 Attending Provider: Deanne Bowles MD    Allergies: Ampicillin, Medrol [Methylprednisolone], Myrbetriq [Mirabegron], Penicillins, Bee Venom, Melatonin    Isolation: None   Infection: None   Code Status: CPR    Ht: 167.6 cm (66\")   Wt: 90.8 kg (200 lb 2.8 oz)    Admission Cmt: None   Principal Problem: Atrial arrhythmia [I49.8]                   Active Insurance as of 2/3/2025       Primary Coverage       Payor Plan Insurance Group Employer/Plan Group    HUMANA MEDICARE REPLACEMENT HUMANA MED ADV GROUP [id number]       Payor Plan Address Payor Plan Phone Number Payor Plan Fax Number Effective Dates    [address] [address] [phone]  2018 - None Entered    [facility] [address]         Subscriber Name Subscriber Birth Date Member ID       RC BLANDON 1935 [id number]                     Emergency Contacts        (Rel.) Home Phone Work Phone Mobile Phone    J LuisSusana PHAM (Power of ) [phone] -- [phone]                 History & Physical        Emery Guzman, DO at 25 0657            [facility] HOSPITALIST   HISTORY AND PHYSICAL      Name:  Rc Blandon   Age:  89 y.o.  Sex:  male  :  1935  MRN:  [id number]   Visit Number:  [id number]  Admission Date:  2/3/2025  Date Of Service:  25  Primary Care Physician:  David Em, " MD    Chief Complaint:     dyspnea    History Of Presenting Illness:      89-year-old male resident [place].  Past medical history of atrial fibrillation, has had a procedure on his aortic and pulmonic valves in the past involving a porcine valve, CHF EF 36 to 40% with associated moderate mitral regurgitation, chronically on 2 L.  Chief complaint dyspnea.  Patient had a presented with acute dyspnea and fluid overload.  Reported not taking his metoprolol as he had not been feeling well. Was very weak.  Has not been taking Eliquis due to subconjunctival hematoma.  Rate control improved with administration of the metoprolol in the ED.  Patient continued to have low oxygen levels and dyspnea so we were asked to admit him.  Started CPAP in the ED which he is chronically on at night. Patient feels he is back near his baseline at this time.  Full code.    Pertinent findings: Highly sensitive troponin 47, proBNP 11,175, potassium 5.4, anion gap 15.1, creatinine 1.55, total bilirubin 1.8, WBC 7.93, COVID and flu negative, chest x-ray shows sternotomy wires bilateral effusions right greater than left interstitial prominence, EKG shows irregular and erratic variable morphology wide-complex tachycardia patient does have a history of RBBB    ED Medications:    Medications   sodium chloride 0.9 % flush 10 mL (has no administration in time range)   metoprolol tartrate (LOPRESSOR) injection 5 mg (5 mg Intravenous Given 2/3/25 0909)       Edited by: Emery Guzman DO at 2/3/2025 0649     Review Of Systems:    All systems were reviewed and negative except as mentioned in history of presenting illness, assessment and plan.    Past Medical History: Patient  has a past medical history of Aortic stenosis, Arthritis, Bilateral impacted cerumen (11/23/2016), Bronchitis, CHF (congestive heart failure), Chronic gout of right foot (06/13/2016), COVID-19 vaccine series completed (05/12/2021), Depression, Diabetes mellitus,  Diverticulitis, Exogenous obesity, Gout, Heart murmur, History of vitamin D deficiency, Hypercholesteremia (08/11/2016), Hyperlipidemia, Hypertension, Impaired mobility, Kidney lesion, Malignant neoplasm of prostate, Morbid obesity (08/11/2016), Pneumonia (05/12/2021), Primary osteoarthritis involving multiple joints (06/13/2016), Pulmonary embolism, Sleep apnea (05/12/2021), Uncontrolled type 2 diabetes mellitus with hyperglycemia (06/13/2016), and Vertigo.    Past Surgical History: Patient  has a past surgical history that includes Colostomy (1999); Other surgical history; Exploratory laparotomy; Colonoscopy; Revision Colostomy; Prostate surgery; Prostatectomy (2000); Colon surgery; Tonsillectomy; Skin cancer excision; Lipoma Excision; Mohs surgery; Cardiac valve replacement (05/12/2021); nephroureterectomy (Right, 12/7/2021); and Cystoscopy (N/A, 12/7/2021).    Social History: Patient  reports that he has never smoked. He has never been exposed to tobacco smoke. He has never used smokeless tobacco. He reports that he does not drink alcohol and does not use drugs.    Family History:  Patient's family history has been reviewed and found to be noncontributory.     Allergies:      Ampicillin, Medrol [methylprednisolone], Myrbetriq [mirabegron], Penicillins, Bee venom, and Melatonin    Home Medications:    Prior to Admission Medications       Prescriptions Last Dose Informant Patient Reported? Taking?    allopurinol (ZYLOPRIM) 100 MG tablet   No No    TAKE 1 TABLET BY MOUTH DAILY    amiodarone (PACERONE) 200 MG tablet   Yes No    Take 1 tablet by mouth Daily.    apixaban (ELIQUIS) 2.5 MG tablet tablet   No No    Take 1 tablet by mouth Every 12 (Twelve) Hours for 30 days. Indications: Atrial Fibrillation    Apoaequorin (PREVAGEN PO)   Yes No    Take  by mouth.    ascorbic acid (VITAMIN C) 1000 MG tablet   Yes No    Take 1 tablet by mouth Daily.    Calcium Carb-Cholecalciferol 600-5 MG-MCG tablet   No No    Take 1  "tablet by mouth Every Other Day. Indications: Low Amount of Calcium in the Blood    ciclopirox (LOPROX) 1 % shampoo   Yes No    CINNAMON PO   Yes No    Take  by mouth.    Droplet Insulin Syringe 31G X 5/16\" 0.5 ML misc   Yes No    Dulaglutide (Trulicity) 3 MG/0.5ML solution pen-injector   No No    Inject 0.5 mL under the skin into the appropriate area as directed 1 (One) Time Per Week.    Entresto 24-26 MG tablet   No No    TAKE ONE TABLET BY MOUTH EVERY 12 HOURS    enzalutamide (XTANDI) 40 MG tablet tablet   No No    Take 3 tablets by mouth Daily.    fluocinonide (LIDEX) 0.05 % external solution   Yes No    fluticasone (FLONASE) 50 MCG/ACT nasal spray   No No    2 sprays into the nostril(s) as directed by provider Daily. 2 puffs each nostril    Patient taking differently:  Administer 2 sprays into the nostril(s) as directed by provider Daily As Needed for Rhinitis or Allergies.    furosemide (LASIX) 40 MG tablet   No No    Take 1 tablet by mouth Daily. Take twice a day until swelling improves and then take once daily after that    glucose blood (True Metrix Blood Glucose Test) test strip   No No    Use to test blood sugar 3 times daily.  Dx E11.65    Jardiance 25 MG tablet tablet   No No    TAKE 1 TABLET BY MOUTH DAILY    ketoconazole (NIZORAL) 2 % shampoo   Yes No    Apply 1 application  topically to the appropriate area as directed 2 (Two) Times a Week. Indications: Tinea Versicolor    Lantus 100 UNIT/ML injection   No No    Inject up to 50 units daily subcutaneously    Patient taking differently:  Inject up to 20 units daily subcutaneously    metoprolol succinate XL (TOPROL-XL) 25 MG 24 hr tablet   No No    Take 1 tablet by mouth Daily.    multivitamin with minerals tablet tablet   Yes No    Take 1 tablet by mouth Daily. AREDS 2  Indications: vitamin deficiency    potassium chloride 10 MEQ CR tablet   No No    Take 2 tablets by mouth Daily for 21 days.    pravastatin (PRAVACHOL) 40 MG tablet   No No    TAKE ONE " TABLET BY MOUTH EVERY NIGHT AT BEDTIME    triamcinolone (KENALOG) 0.1 % cream   Yes No    Apply 1 Application topically to the appropriate area as directed 2 (Two) Times a Day.    VITAMIN D, CHOLECALCIFEROL, PO   Yes No    Take  by mouth.              Vital Signs:  Temp:  [98 °F (36.7 °C)] 98 °F (36.7 °C)  Heart Rate:  [] 91  Resp:  [20] 20  BP: ()/(66-92) 101/73    There were no vitals filed for this visit.  There is no height or weight on file to calculate BMI.    Physical Exam:     Most recent vital Signs: /73   Pulse 91   Temp 98 °F (36.7 °C) (Oral)   Resp 20   SpO2 100%     Constitutional: Awake, alert  Eyes: PERRLA, sclerae anicteric, no conjunctival injection  HENT: NCAT, mucous membranes moist  Neck: Supple, no thyromegaly, no lymphadenopathy, trachea midline  Respiratory: Bibasilar Rales bilaterally, nonlabored respirations on CPAP  Cardiovascular: RRR, no murmurs, rubs, or gallops, palpable pedal pulses bilaterally  Gastrointestinal: Positive bowel sounds, soft, nontender, nondistended  Musculoskeletal: 3+ bilateral ankle edema, no clubbing or cyanosis to extremities  Psychiatric: Appropriate affect, cooperative  Neurologic: Oriented x 3, speech clear  Skin: No rashes  Edited by: Emery Guzman DO at 2/3/2025 0642      Laboratory data:    I have reviewed the labs done in the emergency room.    Results from last 7 days   Lab Units 02/03/25  0422   SODIUM mmol/L 138   POTASSIUM mmol/L 5.4*   CHLORIDE mmol/L 104   CO2 mmol/L 18.9*   BUN mg/dL 30*   CREATININE mg/dL 1.55*   CALCIUM mg/dL 7.9*   BILIRUBIN mg/dL 1.8*   ALK PHOS U/L 125*   ALT (SGPT) U/L 15   AST (SGOT) U/L 32   GLUCOSE mg/dL 218*     Results from last 7 days   Lab Units 02/03/25  0422   WBC 10*3/mm3 7.93   HEMOGLOBIN g/dL 14.3   HEMATOCRIT % 45.4   PLATELETS 10*3/mm3 108*         Results from last 7 days   Lab Units 02/03/25  0615 02/03/25  0422   HSTROP T ng/L 47* 47*     Results from last 7 days   Lab Units  "02/03/25 0422   PROBNP pg/mL 11,175.0*                       Invalid input(s): \"USDES\", \"NITRITITE\", \"BACT\", \"EP\"    Pain Management Panel  More data exists         4/4/2024 12/29/2022   Pain Management Panel   Creatinine, Urine 10  72.7       Details                   EKG:      EKG shows irregular and erratic variable morphology wide-complex tachycardia patient does have a history of RBBB    Radiology:    No radiology results for the last 3 days    Assessment/Plan:      Atrial arrhythmia    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)    Valvular heart disease    RBBB  -- Suspect accelerated rate due to nonadherence to beta-blocker.  Suspect patient's heart failure is worsening.  In combination with mitral regurgitation his heart function is likely much worse than his EF would suggest.  His blood pressure at this time does not allow for much diuresis.  Will have cardiology to consult on him and make recommendations.  Otherwise continue his home medicines.  -- Active Treatments: Hold Entresto to facilitate diuresis, p.o. Lasix, continue metoprolol and amiodarone as well as Eliquis  -- Pending Results: Cardiology, palliative, PT/OT          Type 2 diabetes mellitus with hyperglycemia, with long-term current use of insulin    Stage 3b chronic kidney disease  -- Chronic medical conditions  -- Active Treatments: Subcutaneous insulin  -- Pending Results:serial labs            DVT Prophylaxis: Eliquis  Code Status: Full  Diet: Cardiac diabetic renal  Risk assessment: High anticipate greater than two night stay          Edited by: Emery Guzman DO at 2/3/2025 0644           Advance Care Planning  ACP discussion was held with the patient during this visit. Patient has an advance directive in EMR which is still valid.            Emery Guzman DO  02/03/25  06:57 EST    Dictated utilizing Dragon dictation.      Electronically signed by Emery Guzman DO at 02/03/25 0658        "   Physician Progress Notes (most recent note)        Andriy Foote MD, FASN at 25 0807                [facility] Progress Note  [facility]. [place]        Patient Name: Blane Dietz  : 1935  MRN: [id number]   LOS: 3 days    Patient Care Team:  David Em MD as PCP - General (Internal Medicine)  Ha Toussaint MD as Consulting Physician (Cardiology)  Shannan Ramon MD as Consulting Physician (Hematology and Oncology)  Xavier Boston MD as Consulting Physician (Endocrinology)  Brionna Weinstein DO as Surgeon (General Surgery)  Sophia Ferreira PA as Physician Assistant (Endocrinology)  Abbey Reyez APRN as Nurse Practitioner (Nurse Practitioner)    Chief Complaint:    Chief Complaint   Patient presents with    Shortness of Breath     Primary Care Physician:  David Em MD  Date of admission: 2/3/2025    Subjective     Interval History:   Follow-up acute on chronic kidney disease stage III .  Patient sitting on edge of bed, just finished breakfast.  He says he feels rough today because he did not sleep well.  He is just putting on his CPAP.  He denies chest pain shortness of breath nausea vomiting.  Events noted from last 24 hours.  I reviewed the chart and other providers notes, labs and procedures done since my last note.    Review of Systems:   As noted above.    Objective     Vitals:   Temp:  [97.2 °F (36.2 °C)-98 °F (36.7 °C)] 97.2 °F (36.2 °C)  Heart Rate:  [87-91] 91  Resp:  [18-22] 22  BP: (109-156)/(69-95) 156/95  Flow (L/min) (Oxygen Therapy):  [3-3.5] 3.5    Intake/Output Summary (Last 24 hours) at 2025 0807  Last data filed at 2025 0600  Gross per 24 hour   Intake 360 ml   Output 400 ml   Net -40 ml       Physical Exam:    General Appearance: alert, oriented x 3, no acute distress   Skin: warm and dry  HEENT: oral mucosa normal, nonicteric sclera  Neck: supple, no JVD  Lungs: Decreased breath sounds all over  the chest, patient on CPAP  Heart: IRRR,   Abdomen: obese, soft, nontender, non distended and positive bowel sounds.  : no palpable bladder  Extremities: 1-2+ BLE edema, bilateral lower legs are cold and erythematous, no cyanosis or clubbing  Neuro: normal speech and mental status     Scheduled Meds:     Current Facility-Administered Medications   Medication Dose Route Frequency Provider Last Rate Last Admin    acetaminophen (TYLENOL) tablet 650 mg  650 mg Oral Q4H PRN Emery Guzman, DO        [Held by provider] allopurinol (ZYLOPRIM) tablet 100 mg  100 mg Oral Daily Emery Guzman, DO   100 mg at 02/04/25 0825    amiodarone (PACERONE) tablet 200 mg  200 mg Oral Daily Emery Guzman DO   200 mg at 02/05/25 0925    apixaban (ELIQUIS) tablet 2.5 mg  2.5 mg Oral Q12H Emery Guzman, DO   2.5 mg at 02/05/25 2039    ascorbic acid (VITAMIN C) tablet 1,000 mg  1,000 mg Oral Daily Emery Guzman, DO   1,000 mg at 02/05/25 0924    sennosides-docusate (PERICOLACE) 8.6-50 MG per tablet 2 tablet  2 tablet Oral BID PRN Emery Guzman DO        And    polyethylene glycol (MIRALAX) packet 17 g  17 g Oral Daily PRN Emery Guzman DO        And    bisacodyl (DULCOLAX) EC tablet 5 mg  5 mg Oral Daily PRN Emery Guzman, DO        And    bisacodyl (DULCOLAX) suppository 10 mg  10 mg Rectal Daily PRN Emery Guzman, DO        calcium carbonate (TUMS) chewable tablet 500 mg (200 mg elemental)  2 tablet Oral BID PRN Emery Guzman, DO        Calcium Replacement - Follow Nurse / BPA Driven Protocol   Not Applicable PRN Emery Guzman, DO        dextrose (D50W) (25 g/50 mL) IV injection 10-50 mL  10-50 mL Intravenous Q15 Min PRN Emery Guzman, DO   50 mL at 02/05/25 0827    dextrose (GLUTOSE) oral gel 15 g  15 g Oral Q15 Min PRN Emery Guzman, DO        [Held by provider] empagliflozin (JARDIANCE) tablet 25 mg  25 mg Oral Daily Emery Guzman DO   25 mg at  02/04/25 0825    [Held by provider] furosemide (LASIX) tablet 40 mg  40 mg Oral BID Emery Guzman, DO   40 mg at 02/04/25 0825    Glucagon (GLUCAGEN) injection 1 mg  1 mg Intramuscular Q15 Min PRN Emery Guzman,         insulin glargine (LANTUS, SEMGLEE) injection 1-200 Units  1-200 Units Subcutaneous Nightly - Glucommander Emery Guzman, DO   8 Units at 02/05/25 2039    insulin lispro (humaLOG) injection 1-200 Units  1-200 Units Subcutaneous 4x Daily With Meals & Nightly Emery Guzman, DO   2 Units at 02/05/25 1824    insulin lispro (humaLOG) injection 1-200 Units  1-200 Units Subcutaneous PRN Emery Guzman,         Magnesium Standard Dose Replacement - Follow Nurse / BPA Driven Protocol   Not Applicable PRN Emery Guzman, DO        metoprolol succinate XL (TOPROL-XL) 24 hr tablet 25 mg  25 mg Oral Daily Emery Guzman, DO   25 mg at 02/05/25 0925    metoprolol tartrate (LOPRESSOR) injection 2.5 mg  2.5 mg Intravenous Q5 Min PRN Deanne Bowles MD        nitroglycerin (NITROSTAT) SL tablet 0.4 mg  0.4 mg Sublingual Q5 Min PRN Emery Guzman DO        ondansetron ODT (ZOFRAN-ODT) disintegrating tablet 4 mg  4 mg Oral Q6H PRN Emery Guzman DO        Or    ondansetron (ZOFRAN) injection 4 mg  4 mg Intravenous Q6H PRN Emery Guzman,         Phosphorus Replacement - Follow Nurse / BPA Driven Protocol   Not Applicable PRN Emery Guzman DO        Potassium Replacement - Follow Nurse / BPA Driven Protocol   Not Applicable PRN Emery Guzman DO        pravastatin (PRAVACHOL) tablet 40 mg  40 mg Oral Daily Emery Guzman, DO   40 mg at 02/05/25 0924    sodium chloride 0.9 % flush 10 mL  10 mL Intravenous PRN Emery Guzman,         sodium chloride 0.9 % flush 10 mL  10 mL Intravenous Q12H Emery Guzman, DO   10 mL at 02/05/25 2039    sodium chloride 0.9 % flush 10 mL  10 mL Intravenous PRN Emery Guzman DO sodium  "chloride 0.9 % infusion 40 mL  40 mL Intravenous PRN Emery Guzman,            [Held by provider] allopurinol, 100 mg, Oral, Daily  amiodarone, 200 mg, Oral, Daily  apixaban, 2.5 mg, Oral, Q12H  ascorbic acid, 1,000 mg, Oral, Daily  [Held by provider] empagliflozin, 25 mg, Oral, Daily  [Held by provider] furosemide, 40 mg, Oral, BID  insulin glargine, 1-200 Units, Subcutaneous, Nightly - Glucommander  insulin lispro, 1-200 Units, Subcutaneous, 4x Daily With Meals & Nightly  metoprolol succinate XL, 25 mg, Oral, Daily  pravastatin, 40 mg, Oral, Daily  sodium chloride, 10 mL, Intravenous, Q12H        IV Meds:        Results Reviewed:   I have personally reviewed the results from the time of this admission to 2/6/2025 08:07 EST     Results from last 7 days   Lab Units 02/06/25 0547 02/05/25 0640 02/04/25 0553 02/03/25  0422   SODIUM mmol/L 139 140 139 138   POTASSIUM mmol/L 4.9 4.6 5.3* 5.4*   CHLORIDE mmol/L 102 105 104 104   CO2 mmol/L 23.8 19.2* 22.2 18.9*   BUN mg/dL 55* 48* 40* 30*   CREATININE mg/dL 2.23* 1.85* 2.05* 1.55*   CALCIUM mg/dL 8.3* 7.7* 8.0* 7.9*   BILIRUBIN mg/dL  --   --  1.5* 1.8*   ALK PHOS U/L  --   --  132* 125*   ALT (SGPT) U/L  --   --  19 15   AST (SGOT) U/L  --   --  38 32   GLUCOSE mg/dL 95 56* 110* 218*       Estimated Creatinine Clearance: 24.6 mL/min (A) (by C-G formula based on SCr of 2.23 mg/dL (H)).    Results from last 7 days   Lab Units 02/06/25  0547   PHOSPHORUS mg/dL 3.2             Results from last 7 days   Lab Units 02/06/25 0547 02/05/25  0640 02/04/25  0553 02/03/25  0422   WBC 10*3/mm3 8.70 7.03 7.76 7.93   HEMOGLOBIN g/dL 14.1 13.8 13.4 14.3   PLATELETS 10*3/mm3 151 101* 118* 108*             Brief Urine Lab Results  (Last result in the past 365 days)        Color   Clarity   Blood   Leuk Est   Nitrite   Protein   CREAT   Urine HCG        04/04/24 1502             10                 No results found for: \"UTPCR\"    Imaging Results (Last 24 Hours)       ** No " results found for the last 24 hours. 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  Currently 27.8.  Cardiorenal syndrome, diabetes as causative agents.  Continue to optimize volume status.  Acute on chronic CHF: Patient with fluid overload, need for diuretics will be assessed on daily basis.  Hypertension: Blood pressure is on the low side, medications on hold likely will improve.  Atrial arrhythmia: Noncompliant with beta-blocker, improved on metoprolol  Type 2 diabetes: Last A1c on record was 9.2% on 7/25/2024.  PVD: Bilateral legs are erythematous and cold to touch.  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      PLAN:  Patient fairly adamant wants to go home today.  He said he will set up a follow-up within a week.  If he goes home we will not restart his Jardiance and Lasix for 48 hours and then he can resume those 2 medications.  Details were discussed with the patient no family in the room.    Details were also discussed with the hospitalist service and or other providers as needed.  Follow-up with me in 1 week.  Continue with rest of the current treatment plan, and monitor with surveillance labs.  Further recommendations will depend on clinical course of the patient during the current hospitalization.   I have reviewed the copied text to this note, it was edited and the changes made as needed.  It is accurate to the point, when the note was signed today.     Thank you for involving us in the care of Blane Dietz.  Please feel free to call with any questions.    Andriy Foote MD,  LOAN  25  08:07 [facility]    [facility]  [phone]      Part of this note may be an electronic transcription/translation of spoken language to printed text using the Dragon Dictation System.                   Electronically signed by Adnriy Foote MD, LOAN at 25 0863          Physical Therapy Notes (last 72 hours)        Farhad Kaur PT Student at 25  Version 1 of 1      Attestation signed by Aliyah High PT at 25    I attest to the accuracy and completeness of this note.                Goal Outcome Evaluation:  Plan of Care Reviewed With: (P) patient        Progress: (P) no change  Outcome Evaluation: (P) PT evaluation completed today. Pt is an 89 year old male received sitting EOB with HR of 95 and oxygen saturation of 98% 3L nc. Pt reports living at [facility] and reports independence with all ADL's and uses a front wheel walker for most ambulation but also has a rollator he uses as a seat. Pt denies any pain at this time. Pt performed sit to stand with CGA and front wheel walker. Pt ambulated 60' with CGA and front wheel walker and needed occasional cues to step toward walker and correct forward flexed posture. Pt sat in recliner and strength in B LE was WFL. Pt demonstrated good static and dynamic standing balance throughout session. Evaluation ended with pt seated in recliner with call bell within reach and HR of 93 and oxygen saturation of 100%. Pt will benefit from skilled PT to improve functional activity tolerance.    Anticipated Discharge Disposition (PT): (P) home with home health                          Electronically signed by Aliyah High PT at 25       Farhad Kaur PT Student at 259  Version 1 of 1      Attestation signed by Aliyah High PT at 25    I have reviewed this note and it is complete and accurate                Patient Name: Blane Dietz  : 1935    MRN: [id number]                               Today's Date: 2/4/2025       Admit Date: 2/3/2025    Visit Dx:     ICD-10-CM ICD-9-CM   1. Atrial fibrillation with rapid ventricular response  I48.91 427.31   2. Other hypervolemia  E87.79 276.69     Patient Active Problem List   Diagnosis    Essential hypertension    Type 2 diabetes mellitus with hyperglycemia, with long-term current use of insulin    Prostate cancer    Aortic stenosis    Sleep apnea    S/P AVR    HLD (hyperlipidemia)    Renal mass    s/p right nepheroureterectomy and adrenalectomy     Diffuse large B-cell lymphoma of solid organ excluding spleen    PICC (peripherally inserted central catheter) flush    History of pulmonary embolism    History of malignant neoplasm of prostate    Lymphoma of kidney    Stage 3b chronic kidney disease    Pneumonia due to COVID-19 virus    Abrasion    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)    Valvular heart disease    Atrial arrhythmia    RBBB    Atrial fibrillation with rapid ventricular response     Past Medical History:   Diagnosis Date    Aortic stenosis     status post ROSS procedure, remote, with December 2015 echocardiography revealing normal aortic and pulmonary valve function, normal ejection fraction and mild to moderate MR    Arthritis     Bilateral impacted cerumen 11/23/2016    Bronchitis     CHF (congestive heart failure)     Chronic gout of right foot 06/13/2016    Impression: 03/08/2016 - stable.;     COVID-19 vaccine series completed 05/12/2021    Maderna 2nd dose 3/12/21.    Depression     Diabetes mellitus     Diverticulitis     Exogenous obesity     Gout     Heart murmur     History of vitamin D deficiency     Hypercholesteremia 08/11/2016    Hyperlipidemia     Hypertension     Impaired mobility     Kidney lesion     Malignant neoplasm of prostate     Morbid obesity 08/11/2016    Pneumonia 05/12/2021    Primary osteoarthritis involving multiple joints 06/13/2016    Impression: 03/08/2016 -  stable;     Pulmonary embolism     Sleep apnea 05/12/2021    C-Pap    Uncontrolled type 2 diabetes mellitus with hyperglycemia 06/13/2016    Impression: 03/08/2016 - seeing Endo .;     Vertigo      Past Surgical History:   Procedure Laterality Date    CARDIAC VALVE REPLACEMENT  05/12/2021    Ross procedure 22 years ago    COLON SURGERY      COLONOSCOPY      COLOSTOMY  1999    COLOSTOMY REVISION      CYSTOSCOPY N/A 12/7/2021    Procedure: CYSTOSCOPY FLEXIBLE;  Surgeon: José Miguel Villafuerte Jr., MD;  Location:  [facility];  Service: Urology;  Laterality: N/A;    EXPLORATORY LAPAROTOMY      With Tissue Removal    LIPOMA EXCISION      MOHS SURGERY      NEPHROURETERECTOMY Right 12/7/2021    Procedure: RIGHT NEPHROURETERECTOMY LAPAROSCOPIC WITH DAVINCI ROBOT;  Surgeon: José Miguel Villafuerte Jr., MD;  Location:  [facility];  Service: Robotics - DaVinci;  Laterality: Right;    OTHER SURGICAL HISTORY      Porcine Valve    PROSTATE SURGERY      PROSTATECTOMY  2000     History of Prostatectomy Perineal Radical    SKIN CANCER EXCISION      from head and lip    TONSILLECTOMY        General Information       [facility] Name 02/04/25 1108          Physical Therapy Time and Intention    Document Type evaluation (P)   -     Mode of Treatment individual therapy;physical therapy (P)   -       Row Name 02/04/25 1108          General Information    Patient Profile Reviewed yes (P)   -     Prior Level of Function independent:;all household mobility;community mobility;ADL's (P)   -     Existing Precautions/Restrictions fall (P)   -     Barriers to Rehab none identified (P)   -       Row Name 02/04/25 1108          Living Environment    People in Home alone (P)   lives at [facility]       Row Name 02/04/25 1108          Home Main Entrance    Number of Stairs, Main Entrance none (P)   -       Row Name 02/04/25 1108          Stairs Within Home, Primary    Number of Stairs, Within Home, Primary none (P)   -       Row Name  02/04/25 1108          Cognition    Orientation Status (Cognition) oriented x 4 (P)   -       Row Name 02/04/25 1108          Safety Issues/Impairments Affecting Functional Mobility    Safety Issues Affecting Function (Mobility) positioning of assistive device;judgment;safety precaution awareness;awareness of need for assistance (P)   -     Impairments Affecting Function (Mobility) balance;endurance/activity tolerance;strength (P)   -               User Key  (r) = Recorded By, (t) = Taken By, (c) = Cosigned By      Initials Name Provider Type     Farhad Kaur PT Student PT Student                   Mobility       Row Name 02/04/25 1108          Bed Mobility    Bed Mobility bed mobility (all) activities (P)   -     All Activities, [place] (Bed Mobility) standby assist (P)   -     Assistive Device (Bed Mobility) bed rails;head of bed elevated (P)   -       Row Name 02/04/25 1108          Sit-Stand Transfer    Sit-Stand [place] (Transfers) contact guard;1 person assist (P)   -     Assistive Device (Sit-Stand Transfers) walker, front-wheeled (P)   -       Row Name 02/04/25 1108          Gait/Stairs (Locomotion)    [place] Level (Gait) contact guard;1 person assist (P)   -     Assistive Device (Gait) walker, front-wheeled (P)   -     Patient was able to Ambulate yes (P)   -     Distance in Feet (Gait) 60 (P)   -     Deviations/Abnormal Patterns (Gait) bilateral deviations;binta decreased;gait speed decreased;stride length decreased (P)   -     Bilateral Gait Deviations forward flexed posture (P)   -     [place] Level (Stairs) not tested (P)   -               User Key  (r) = Recorded By, (t) = Taken By, (c) = Cosigned By      Initials Name Provider Type     Farhad Kaur PT Student PT Student                   Obj/Interventions       Row Name 02/04/25 1336          Range of Motion Comprehensive    General Range of Motion no range of motion deficits identified (P)    -JH       Row Name 02/04/25 1336          Strength Comprehensive (MMT)    General Manual Muscle Testing (MMT) Assessment no strength deficits identified (P)   -JH       Row Name 02/04/25 1336          Balance    Balance Assessment sitting static balance;sitting dynamic balance;standing static balance;standing dynamic balance (P)   -     Static Sitting Balance standby assist (P)   -     Dynamic Sitting Balance standby assist (P)   -     Position, Sitting Balance unsupported;sitting edge of bed (P)   -     Static Standing Balance contact guard (P)   -     Dynamic Standing Balance contact guard (P)   -     Position/Device Used, Standing Balance unsupported;walker, front-wheeled (P)   -     Balance Interventions sit to stand;standing;weight shifting activity (P)   -JH       Row Name 02/04/25 1336          Sensory Assessment (Somatosensory)    Sensory Assessment (Somatosensory) sensation intact (P)   -               User Key  (r) = Recorded By, (t) = Taken By, (c) = Cosigned By      Initials Name Provider Type     Farhad Kaur, PT Student PT Student                   Goals/Plan       Row Name 02/04/25 1350          Gait Training Goal 1 (PT)    Activity/Assistive Device (Gait Training Goal 1, PT) gait (walking locomotion);assistive device use;decrease fall risk;improve balance and speed;increase endurance/gait distance;increase energy conservation;walker, rolling (P)   -     [place] Level (Gait Training Goal 1, PT) modified independence (P)   -     Distance (Gait Training Goal 1, PT) 150 (P)   -     Time Frame (Gait Training Goal 1, PT) short term goal (STG) (P)   -     Progress/Outcome (Gait Training Goal 1, PT) new goal (P)   -JH       Row Name 02/04/25 1350          Patient Education Goal (PT)    Activity (Patient Education Goal, PT) Pt will perform 20 repetitions of B LE exercises to improve functional activity tolerance. (P)   -     [place]/Cues/Accuracy (Memory Goal 2, PT)  demonstrates adequately (P)   -     Time Frame (Patient Education Goal, PT) long term goal (LTG) (P)   -     Progress/Outcome (Patient Education Goal, PT) new goal (P)   -       Row Name 02/04/25 1350          Therapy Assessment/Plan (PT)    Planned Therapy Interventions (PT) balance training;bed mobility training;gait training;home exercise program;neuromuscular re-education;patient/family education;postural re-education;ROM (range of motion);strengthening;transfer training (P)   -               User Key  (r) = Recorded By, (t) = Taken By, (c) = Cosigned By      Initials Name Provider Type     Farhad Kaur, PT Student PT Student                   Clinical Impression       Row Name 02/04/25 1108          Pain    Pretreatment Pain Rating 0/10 - no pain (P)   -     Posttreatment Pain Rating 0/10 - no pain (P)   -       Row Name 02/04/25 1108          Plan of Care Review    Plan of Care Reviewed With patient (P)   -     Progress no change (P)   -     Outcome Evaluation PT evaluation completed today. Pt is an 89 year old male received sitting EOB with HR of 95 and oxygen saturation of 98% 3L nc. Pt reports living at [facility] and reports independence with all ADL's and uses a front wheel walker for most ambulation but also has a rollator he uses as a seat. Pt denies any pain at this time. Pt performed sit to stand with CGA and front wheel walker. Pt ambulated 60' with CGA and front wheel walker and needed occasional cues to step toward walker and correct forward flexed posture. Pt sat in recliner and strength in B LE was WFL. Pt demonstrated good static and dynamic standing balance throughout session. Evaluation ended with pt seated in recliner with call bell within reach and HR of 93 and oxygen saturation of 100%. Pt will benefit from skilled PT to improve functional activity tolerance. (P)   -       Row Name 02/04/25 1103          Therapy Assessment/Plan (PT)    Patient/Family Therapy Goals  Statement (PT) Pt wants to return to prior level of independence. (P)   -     Rehab Potential (PT) good (P)   -     Criteria for Skilled Interventions Met (PT) yes;meets criteria;skilled treatment is necessary (P)   -     Therapy Frequency (PT) 3 times/wk (P)   -       Row Name 02/04/25 1108          Vital Signs    Pretreatment Heart Rate (beats/min) 95 (P)   -     Posttreatment Heart Rate (beats/min) 93 (P)   -     Pre SpO2 (%) 98 (P)   -     O2 Delivery Pre Treatment supplemental O2 (P)   3L  -     Post SpO2 (%) 100 (P)   -     O2 Delivery Post Treatment supplemental O2 (P)   -     Pre Patient Position Sitting (P)   -     Post Patient Position Sitting (P)   -       Row Name 02/04/25 1108          Positioning and Restraints    Pre-Treatment Position sitting in chair/recliner (P)   -     Post Treatment Position chair (P)   -     In Chair sitting;call light within reach;encouraged to call for assist (P)   -               User Key  (r) = Recorded By, (t) = Taken By, (c) = Cosigned By      Initials Name Provider Type    Farhad Rosales, PT Student PT Student                   Outcome Measures       Row Name 02/04/25 1352 02/04/25 0800       How much help from another person do you currently need...    Turning from your back to your side while in flat bed without using bedrails? 4 (P)   - 3  -KJ    Moving from lying on back to sitting on the side of a flat bed without bedrails? 4 (P)   - 3  -KJ    Moving to and from a bed to a chair (including a wheelchair)? 4 (P)   - 3  -KJ    Standing up from a chair using your arms (e.g., wheelchair, bedside chair)? 4 (P)   - 3  -KJ    Climbing 3-5 steps with a railing? 3 (P)   - 2  -KJ    To walk in hospital room? 4 (P)   - 3  -KJ    AM-PAC 6 Clicks Score (PT) 23 (P)   - 17  -KJ    Highest Level of Mobility Goal 7 --> Walk 25 feet or more (P)   - 5 --> Static standing  -KJ      Row Name 02/04/25 1352          Functional Assessment     Outcome Measure Options AM-PAC 6 Clicks Basic Mobility (PT) (P)   -               User Key  (r) = Recorded By, (t) = Taken By, (c) = Cosigned By      Initials Name Provider Type    Frances Noble, RN Registered Nurse     Head, Farhad, PT Student PT Student                                 Physical Therapy Education       Title: PT OT SLP Therapies (In Progress)       Topic: Physical Therapy (In Progress)       Point: Mobility training (Done)       Learning Progress Summary            Patient Acceptance, E,TB, VU by  at 2/4/2025 5993    Comment: Pt trained in functional activities, proper usage of walker, and PT POC.                      Point: Home exercise program (Not Started)       Learner Progress:  Not documented in this visit.              Point: Body mechanics (Not Started)       Learner Progress:  Not documented in this visit.              Point: Precautions (Not Started)       Learner Progress:  Not documented in this visit.                              User Key       Initials Effective Dates Name Provider Type Discipline     01/13/25 -  Farhad Kaur, PT Student PT Student PT                  PT Recommendation and Plan  Planned Therapy Interventions (PT): (P) balance training, bed mobility training, gait training, home exercise program, neuromuscular re-education, patient/family education, postural re-education, ROM (range of motion), strengthening, transfer training  Progress: (P) no change  Outcome Evaluation: (P) PT evaluation completed today. Pt is an 89 year old male received sitting EOB with HR of 95 and oxygen saturation of 98% 3L nc. Pt reports living at [facility] and reports independence with all ADL's and uses a front wheel walker for most ambulation but also has a rollator he uses as a seat. Pt denies any pain at this time. Pt performed sit to stand with CGA and front wheel walker. Pt ambulated 60' with CGA and front wheel walker and needed occasional cues to step toward walker and  correct forward flexed posture. Pt sat in recliner and strength in B LE was WFL. Pt demonstrated good static and dynamic standing balance throughout session. Evaluation ended with pt seated in recliner with call bell within reach and HR of 93 and oxygen saturation of 100%. Pt will benefit from skilled PT to improve functional activity tolerance.     Time Calculation:   PT Evaluation Complexity  History, PT Evaluation Complexity: (P) 1-2 personal factors and/or comorbidities  Examination of Body Systems (PT Eval Complexity): (P) 1-2 elements  Clinical Presentation (PT Evaluation Complexity): (P) stable  Clinical Decision Making (PT Evaluation Complexity): (P) low complexity  Overall Complexity (PT Evaluation Complexity): (P) low complexity     PT Charges       Row Name 02/04/25 1108             Time Calculation    Start Time 1108 (P)   -      PT Received On 02/04/25 (P)   -      PT Goal Re-Cert Due Date 02/14/25 (P)   -         Untimed Charges    PT Eval/Re-eval Minutes 45 (P)   -         Total Minutes    Untimed Charges Total Minutes 45 (P)   -       Total Minutes 45 (P)   -                User Key  (r) = Recorded By, (t) = Taken By, (c) = Cosigned By      Initials Name Provider Type     Farhad Kaur, PT Student PT Student                  Therapy Charges for Today       Code Description Service Date Service Provider Modifiers Qty    [id number]  PT EVAL LOW COMPLEXITY 3 2/4/2025 Farhad Kaur, PT Student GP 1            PT G-Codes  Outcome Measure Options: (P) AM-PAC 6 Clicks Basic Mobility (PT)  AM-PAC 6 Clicks Score (PT): (P) 23  PT Discharge Summary  Anticipated Discharge Disposition (PT): (P) home with home health    Farhad Kaur PT Student  2/4/2025      Electronically signed by Aliyah High, PT at 02/04/25 1912       Mackenzie Whitfield, PTA at 02/05/25 1440  Version 1 of 1         Pt declined PT stating not now, I'm just sitting here and resting. PT to f/u at later date      Electronically signed  "by Mackenzie Whitfield, PTA at 25 1640          Occupational Therapy Notes (last 4 days)        Horace Mariscal OT at 25 1148          OT attempted tx. Pt sitting EOB. Pt requested OT check back at later time secondary to \"person after person\" this am and states \"It's time for a nap.\" OT to follow up as time allows.    Electronically signed by Horace Mariscal OT at 25 1149       Dary Ku at 25 1411          Patient Name: Blane Dietz  : 1935    MRN: [id number]                              Today's Date: 2025       Admit Date: 2/3/2025    Visit Dx:     ICD-10-CM ICD-9-CM   1. Atrial fibrillation with rapid ventricular response  I48.91 427.31   2. Other hypervolemia  E87.79 276.69     Patient Active Problem List   Diagnosis    Essential hypertension    Type 2 diabetes mellitus with hyperglycemia, with long-term current use of insulin    Prostate cancer    Aortic stenosis    Sleep apnea    S/P AVR    HLD (hyperlipidemia)    Renal mass    s/p right nepheroureterectomy and adrenalectomy     Diffuse large B-cell lymphoma of solid organ excluding spleen    PICC (peripherally inserted central catheter) flush    History of pulmonary embolism    History of malignant neoplasm of prostate    Lymphoma of kidney    Stage 3b chronic kidney disease    Pneumonia due to COVID-19 virus    Abrasion    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)    Valvular heart disease    Atrial arrhythmia    RBBB    Atrial fibrillation with rapid ventricular response     Past Medical History:   Diagnosis Date    Aortic stenosis     status post ROSS procedure, remote, with 2015 echocardiography revealing normal aortic and pulmonary valve function, normal ejection fraction and mild to moderate MR    Arthritis     Bilateral impacted cerumen 2016    Bronchitis     CHF (congestive heart failure)     Chronic gout of right foot 2016    Impression: " 03/08/2016 - stable.;     COVID-19 vaccine series completed 05/12/2021    Maderna 2nd dose 3/12/21.    Depression     Diabetes mellitus     Diverticulitis     Exogenous obesity     Gout     Heart murmur     History of vitamin D deficiency     Hypercholesteremia 08/11/2016    Hyperlipidemia     Hypertension     Impaired mobility     Kidney lesion     Malignant neoplasm of prostate     Morbid obesity 08/11/2016    Pneumonia 05/12/2021    Primary osteoarthritis involving multiple joints 06/13/2016    Impression: 03/08/2016 - stable;     Pulmonary embolism     Sleep apnea 05/12/2021    C-Pap    Uncontrolled type 2 diabetes mellitus with hyperglycemia 06/13/2016    Impression: 03/08/2016 - seeing Endo .;     Vertigo      Past Surgical History:   Procedure Laterality Date    CARDIAC VALVE REPLACEMENT  05/12/2021    Ross procedure 22 years ago    COLON SURGERY      COLONOSCOPY      COLOSTOMY  1999    COLOSTOMY REVISION      CYSTOSCOPY N/A 12/7/2021    Procedure: CYSTOSCOPY FLEXIBLE;  Surgeon: José Miguel Villafuerte Jr., MD;  Location:  [facility];  Service: Urology;  Laterality: N/A;    EXPLORATORY LAPAROTOMY      With Tissue Removal    LIPOMA EXCISION      MOHS SURGERY      NEPHROURETERECTOMY Right 12/7/2021    Procedure: RIGHT NEPHROURETERECTOMY LAPAROSCOPIC WITH DAVINCI ROBOT;  Surgeon: José Miguel Villafuerte Jr., MD;  Location:  [facility];  Service: Robotics - DaVinci;  Laterality: Right;    OTHER SURGICAL HISTORY      Porcine Valve    PROSTATE SURGERY      PROSTATECTOMY  2000     History of Prostatectomy Perineal Radical    SKIN CANCER EXCISION      from head and lip    TONSILLECTOMY        General Information       Row Name 02/04/25 1715          OT Time and Intention    Document Type evaluation  -     Mode of Treatment occupational therapy  -AH     Patient Effort good  -       Row Name 02/04/25 6420          General Information    Patient Profile Reviewed yes  -AH     Prior Level of Function independent:;ADL's;all  household mobility  -     Existing Precautions/Restrictions fall;oxygen therapy device and L/min  -     Barriers to Rehab medically complex  -       Row Name 02/04/25 1355          Occupational Profile    Reason for Services/Referral (Occupational Profile) ADL decline  -[facility] Name 02/04/25 1355          Living Environment    People in Home facility resident  pt lives at [facility]  -       Row Name 02/04/25 1355          Home Main Entrance    Number of Stairs, Main Entrance none  -[facility] Name 02/04/25 1355          Stairs Within Home, Primary    Number of Stairs, Within Home, Primary none  -[facility] Name 02/04/25 1355          Cognition    Orientation Status (Cognition) oriented x 4  -       Row Name 02/04/25 1355          Safety Issues/Impairments Affecting Functional Mobility    Safety Issues Affecting Function (Mobility) safety precaution awareness;safety precautions follow-through/compliance  -     Impairments Affecting Function (Mobility) endurance/activity tolerance;shortness of breath  -               User Key  (r) = Recorded By, (t) = Taken By, (c) = Cosigned By      Initials Name Provider Type     Dary Ku Occupational Therapist                     Mobility/ADL's       Row Name 02/04/25 1359          Bed Mobility    Comment, (Bed Mobility) pt sitting eob upon arrival  -[facility] Name 02/04/25 1359          Transfers    Transfers sit-stand transfer  -[facility] Name 02/04/25 1359          Sit-Stand Transfer    Sit-Stand [place] (Transfers) contact guard;verbal cues  -     Assistive Device (Sit-Stand Transfers) walker, front-wheeled  -       Row Name 02/04/25 1359          Functional Mobility    Functional Mobility- Ind. Level verbal cues required;contact guard assist  -     Functional Mobility- Device walker, front-wheeled  -     Functional Mobility-Distance (Feet) 60  -     Functional Mobility- Safety Issues supplemental O2  -     Patient was  able to Ambulate yes  -AH       Row Name 02/04/25 1359          Activities of Daily Living    BADL Assessment/Intervention bathing;upper body dressing;lower body dressing;grooming;feeding;toileting  -AH       Row Name 02/04/25 1359          Bathing Assessment/Intervention    [place] Level (Bathing) minimum assist (75% patient effort)  -AH       Row Name 02/04/25 1359          Upper Body Dressing Assessment/Training    [place] Level (Upper Body Dressing) set up  -AH       Row Name 02/04/25 1359          Lower Body Dressing Assessment/Training    [place] Level (Lower Body Dressing) minimum assist (75% patient effort)  -AH       Row Name 02/04/25 1359          Grooming Assessment/Training    [place] Level (Grooming) set up  -AH       Row Name 02/04/25 1359          Self-Feeding Assessment/Training    [place] Level (Feeding) independent  -AH       Row Name 02/04/25 1359          Toileting Assessment/Training    [place] Level (Toileting) minimum assist (75% patient effort)  -               User Key  (r) = Recorded By, (t) = Taken By, (c) = Cosigned By      Initials Name Provider Type    Dary Lee Occupational Therapist                   Obj/Interventions       [facility] Name 02/04/25 1401          Sensory Assessment (Somatosensory)    Sensory Assessment (Somatosensory) sensation intact  -AH       Row Name 02/04/25 1401          Vision Assessment/Intervention    Visual Impairment/Limitations WFL  -AH       Row Name 02/04/25 1401          Range of Motion Comprehensive    General Range of Motion bilateral upper extremity ROM WFL  -AH       Row Name 02/04/25 1401          Strength Comprehensive (MMT)    Comment, General Manual Muscle Testing (MMT) Assessment [facility]               User Key  (r) = Recorded By, (t) = Taken By, (c) = Cosigned By      Initials Name Provider Type    Dary Lee Occupational Therapist                   Goals/Plan       [facility] Name 02/04/25 1409          Bed  Mobility Goal 1 (OT)    Activity/Assistive Device (Bed Mobility Goal 1, OT) bed mobility activities, all  -     [place] Level/Cues Needed (Bed Mobility Goal 1, OT) modified independence  -AH     Time Frame (Bed Mobility Goal 1, OT) by discharge  -     Progress/Outcomes (Bed Mobility Goal 1, OT) goal ongoing  -       Row Name 02/04/25 1409          Transfer Goal 1 (OT)    Activity/Assistive Device (Transfer Goal 1, OT) sit-to-stand/stand-to-sit;walker, rolling  -     [place] Level/Cues Needed (Transfer Goal 1, OT) standby assist  -AH     Time Frame (Transfer Goal 1, OT) by discharge  -     Progress/Outcome (Transfer Goal 1, OT) goal ongoing  -       Row Name 02/04/25 1409          Bathing Goal 1 (OT)    Activity/Device (Bathing Goal 1, OT) bathing skills, all  -     [place] Level/Cues Needed (Bathing Goal 1, OT) set-up required  -     Time Frame (Bathing Goal 1, OT) long term goal (LTG);1 week  -     Progress/Outcomes (Bathing Goal 1, OT) goal ongoing  -       Row Name 02/04/25 1409          Dressing Goal 1 (OT)    Activity/Device (Dressing Goal 1, OT) lower body dressing  -     [place]/Cues Needed (Dressing Goal 1, OT) set-up required  -     Time Frame (Dressing Goal 1, OT) long term goal (LTG);1 week  -     Progress/Outcome (Dressing Goal 1, OT) goal ongoing  -       Row Name 02/04/25 1409          Toileting Goal 1 (OT)    Activity/Device (Toileting Goal 1, OT) adjust/manage clothing;perform perineal hygiene  -     [place] Level/Cues Needed (Toileting Goal 1, OT) supervision required  -     Time Frame (Toileting Goal 1, OT) by discharge  -     Progress/Outcome (Toileting Goal 1, OT) goal ongoing  -       Row Name 02/04/25 1409          Strength Goal 1 (OT)    Strength Goal 1 (OT) Pt will perform UB strengthening ex using theraband for resistance.  -     Time Frame (Strength Goal 1, OT) by discharge  -     Progress/Outcome (Strength Goal 1, OT) goal  ongoing  -       Row Name 02/04/25 1409          Therapy Assessment/Plan (OT)    Planned Therapy Interventions (OT) activity tolerance training;BADL retraining;patient/caregiver education/training;transfer/mobility retraining;strengthening exercise  -               User Key  (r) = Recorded By, (t) = Taken By, (c) = Cosigned By      Initials Name Provider Type     Dary Ku Occupational Therapist                   Clinical Impression       Row Name 02/04/25 1401          Pain Assessment    Pretreatment Pain Rating 0/10 - no pain  -     Posttreatment Pain Rating 0/10 - no pain  -[facility] Name 02/04/25 1401          Plan of Care Review    Plan of Care Reviewed With patient  -     Progress no change  -     Outcome Evaluation Pt seen for OT evaluation today.  Pt lives at [facility] where he is able to bathe and dress himself and uses a walker to walk around his room.  Pt received sitting eob today on 3L O2 and was able to stand with cga and walked 60' with cga using RW.  Pt is expected to benefit from skilled OT to improve his strength and independence with ADL tasks.  Pt would benefit from home health therapy services upon d/c from the hospital.  -       Row Name 02/04/25 1401          Therapy Assessment/Plan (OT)    Patient/Family Therapy Goal Statement (OT) d/c home with [facility]  -     Rehab Potential (OT) good  -AH     Criteria for Skilled Therapeutic Interventions Met (OT) yes;skilled treatment is necessary  -     Therapy Frequency (OT) 3 times/wk  -       Row Name 02/04/25 1401          Therapy Plan Review/Discharge Plan (OT)    Anticipated Discharge Disposition (OT) assisted living;home with home health  -AH       Row Name 02/04/25 1401          Vital Signs    Pre SpO2 (%) 98  -     O2 Delivery Pre Treatment supplemental O2  3L  -AH     O2 Delivery Intra Treatment supplemental O2  -     Post SpO2 (%) 100  -     O2 Delivery Post Treatment supplemental O2  -[facility]  Name 02/04/25 1401          Positioning and Restraints    Pre-Treatment Position in bed  -AH     Post Treatment Position chair  -AH     In Chair sitting;call light within reach;encouraged to call for assist  -               User Key  (r) = Recorded By, (t) = Taken By, (c) = Cosigned By      Initials Name Provider Type    Dary Lee Occupational Therapist                   Outcome Measures       Row Name 02/04/25 1410          How much help from another is currently needed...    Putting on and taking off regular lower body clothing? 3  -AH     Bathing (including washing, rinsing, and drying) 3  -AH     Toileting (which includes using toilet bed pan or urinal) 3  -AH     Putting on and taking off regular upper body clothing 4  -AH     Taking care of personal grooming (such as brushing teeth) 3  -AH     Eating meals 4  -     AM-PAC 6 Clicks Score (OT) 20  -AH       Row Name 02/04/25 1352 02/04/25 0800       How much help from another person do you currently need...    Turning from your back to your side while in flat bed without using bedrails? 4 (P)   -JH 3  -KJ    Moving from lying on back to sitting on the side of a flat bed without bedrails? 4 (P)   -JH 3  -KJ    Moving to and from a bed to a chair (including a wheelchair)? 4 (P)   -JH 3  -KJ    Standing up from a chair using your arms (e.g., wheelchair, bedside chair)? 4 (P)   -JH 3  -KJ    Climbing 3-5 steps with a railing? 3 (P)   -JH 2  -KJ    To walk in hospital room? 4 (P)   -JH 3  -KJ    AM-PAC 6 Clicks Score (PT) 23 (P)   -JH 17  -KJ    Highest Level of Mobility Goal 7 --> Walk 25 feet or more (P)   -JH 5 --> Static standing  -KJ      Row Name 02/04/25 1410 02/04/25 1352       Functional Assessment    Outcome Measure Options AM-PAC 6 Clicks Daily Activity (OT)  - AM-PAC 6 Clicks Basic Mobility (PT) (P)   -JH              User Key  (r) = Recorded By, (t) = Taken By, (c) = Cosigned By      Initials Name Provider Type    Dary Lee  Occupational Therapist    Frances Noble, RN Registered Nurse     Head, Farhad, PT Student PT Student                    Occupational Therapy Education       Title: PT OT SLP Therapies (In Progress)       Topic: Occupational Therapy (In Progress)       Point: ADL training (Done)       Description:   Instruct learner(s) on proper safety adaptation and remediation techniques during self care or transfers.   Instruct in proper use of assistive devices.                  Learning Progress Summary            Patient Acceptance, E,TB, VU by  at 2/4/2025 1410    Comment: Role of OT/POC                      Point: Home exercise program (Not Started)       Description:   Instruct learner(s) on appropriate technique for monitoring, assisting and/or progressing therapeutic exercises/activities.                  Learner Progress:  Not documented in this visit.              Point: Precautions (Not Started)       Description:   Instruct learner(s) on prescribed precautions during self-care and functional transfers.                  Learner Progress:  Not documented in this visit.              Point: Body mechanics (Not Started)       Description:   Instruct learner(s) on proper positioning and spine alignment during self-care, functional mobility activities and/or exercises.                  Learner Progress:  Not documented in this visit.                              User Key       Initials Effective Dates Name Provider Type Discipline     06/16/21 -  Dary Ku Occupational Therapist OT                  OT Recommendation and Plan  Planned Therapy Interventions (OT): activity tolerance training, BADL retraining, patient/caregiver education/training, transfer/mobility retraining, strengthening exercise  Therapy Frequency (OT): 3 times/wk  Plan of Care Review  Plan of Care Reviewed With: patient  Progress: no change  Outcome Evaluation: Pt seen for OT evaluation today.  Pt lives at [facility] where he is able to  bathe and dress himself and uses a walker to walk around his room.  Pt received sitting eob today on 3L O2 and was able to stand with cga and walked 60' with cga using RW.  Pt is expected to benefit from skilled OT to improve his strength and independence with ADL tasks.  Pt would benefit from home health therapy services upon d/c from the hospital.     Time Calculation:   Evaluation Complexity (OT)  Review Occupational Profile/Medical/Therapy History Complexity: brief/low complexity  Assessment, Occupational Performance/Identification of Deficit Complexity: 1-3 performance deficits  Clinical Decision Making Complexity (OT): problem focused assessment/low complexity  Overall Complexity of Evaluation (OT): low complexity     Time Calculation- OT       Row Name 02/04/25 1411             Time Calculation- OT    OT Start Time 1107  -AH      OT Received On 02/04/25  -      OT Goal Re-Cert Due Date 02/14/25  -         Untimed Charges    OT Eval/Re-eval Minutes 45  -AH         Total Minutes    Untimed Charges Total Minutes 45  -AH       Total Minutes 45  -AH                User Key  (r) = Recorded By, (t) = Taken By, (c) = Cosigned By      Initials Name Provider Type    Dary Lee Occupational Therapist                  Therapy Charges for Today       Code Description Service Date Service Provider Modifiers Qty    [id number]  OT EVAL LOW COMPLEXITY 3 2/4/2025 Dary Ku GO 1                 Dary Ku  2/4/2025    Electronically signed by Dary Ku at 02/04/25 1411       Dary Ku at 02/04/25 1410          Goal Outcome Evaluation:  Plan of Care Reviewed With: patient        Progress: no change  Outcome Evaluation: Pt seen for OT evaluation today.  Pt lives at [facility] where he is able to bathe and dress himself and uses a walker to walk around his room.  Pt received sitting eob today on 3L O2 and was able to stand with cga and walked 60' with cga using RW.  Pt is expected to benefit  from skilled OT to improve his strength and independence with ADL tasks.  Pt would benefit from home health therapy services upon d/c from the hospital.    Anticipated Discharge Disposition (OT): assisted living, home with home health                          Electronically signed by Dary Ku at 02/04/25 1410       Horace Mariscal OT at 02/03/25 1239          OT order received. Pt declined OOB activity due to not feeling well this date and requested therapy check back tomorrow. OT to follow up at later date.    Electronically signed by Horace Mariscal OT at 02/03/25 1240

## 2025-02-06 NOTE — NURSING NOTE
Attempted to call report to Herber Crump, transferred to [facility]. Left a voicemail.

## 2025-02-06 NOTE — CASE MANAGEMENT/SOCIAL WORK
Case Management Discharge Note      Final Note: Plans to return to [facility]    Provided Post Acute Provider List?: N/A  Provided Post Acute Provider Quality & Resource List?: N/A    Selected Continued Care - Admitted Since 2/3/2025       Destination    No services have been selected for the patient.                Durable Medical Equipment    No services have been selected for the patient.                Dialysis/Infusion    No services have been selected for the patient.                Home Medical Care    No services have been selected for the patient.                Therapy    No services have been selected for the patient.                Community Resources    No services have been selected for the patient.                Community & DME    No services have been selected for the patient.                    Selected Continued Care - Episodes Includes continued care and service providers with selected services from the active episodes listed below      Oncology Episode start date: 9/11/2023   There are no active outsourced providers for this episode.                 Transportation Services  Private: Car    Final Discharge Disposition Code: 06 - home with home health care

## 2025-02-06 NOTE — THERAPY TREATMENT NOTE
Patient Name: Blane Dietz  : 1935    MRN: [id number]                              Today's Date: 2025       Admit Date: 2/3/2025    Visit Dx:     ICD-10-CM ICD-9-CM   1. Atrial fibrillation with rapid ventricular response  I48.91 427.31   2. Other hypervolemia  E87.79 276.69   3. Viral upper respiratory tract infection  J06.9 465.9   4. Acute HFrEF (heart failure with reduced ejection fraction)  I50.21 428.21     Patient Active Problem List   Diagnosis    Essential hypertension    Type 2 diabetes mellitus with hyperglycemia, with long-term current use of insulin    Prostate cancer    Aortic stenosis    Sleep apnea    S/P AVR    HLD (hyperlipidemia)    Renal mass    s/p right nepheroureterectomy and adrenalectomy     Diffuse large B-cell lymphoma of solid organ excluding spleen    PICC (peripherally inserted central catheter) flush    History of pulmonary embolism    History of malignant neoplasm of prostate    Lymphoma of kidney    Stage 3b chronic kidney disease    Pneumonia due to COVID-19 virus    Abrasion    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)    Valvular heart disease    Atrial arrhythmia    RBBB    Atrial fibrillation with rapid ventricular response    Acute HFrEF (heart failure with reduced ejection fraction)     Past Medical History:   Diagnosis Date    Aortic stenosis     status post ROSS procedure, remote, with 2015 echocardiography revealing normal aortic and pulmonary valve function, normal ejection fraction and mild to moderate MR    Arthritis     Bilateral impacted cerumen 2016    Bronchitis     CHF (congestive heart failure)     Chronic gout of right foot 2016    Impression: 2016 - stable.;     COVID-19 vaccine series completed 2021    [place] 2nd dose 3/12/21.    Depression     Diabetes mellitus     Diverticulitis     Exogenous obesity     Gout     Heart murmur     History of vitamin D deficiency     Hypercholesteremia  08/11/2016    Hyperlipidemia     Hypertension     Impaired mobility     Kidney lesion     Malignant neoplasm of prostate     Morbid obesity 08/11/2016    Pneumonia 05/12/2021    Primary osteoarthritis involving multiple joints 06/13/2016    Impression: 03/08/2016 - stable;     Pulmonary embolism     Sleep apnea 05/12/2021    C-Pap    Uncontrolled type 2 diabetes mellitus with hyperglycemia 06/13/2016    Impression: 03/08/2016 - seeing Endo .;     Vertigo      Past Surgical History:   Procedure Laterality Date    CARDIAC VALVE REPLACEMENT  05/12/2021    Ross procedure 22 years ago    COLON SURGERY      COLONOSCOPY      COLOSTOMY  1999    COLOSTOMY REVISION      CYSTOSCOPY N/A 12/7/2021    Procedure: CYSTOSCOPY FLEXIBLE;  Surgeon: José Miguel Villafuerte Jr., MD;  Location:  [facility];  Service: Urology;  Laterality: N/A;    EXPLORATORY LAPAROTOMY      With Tissue Removal    LIPOMA EXCISION      MOHS SURGERY      NEPHROURETERECTOMY Right 12/7/2021    Procedure: RIGHT NEPHROURETERECTOMY LAPAROSCOPIC WITH DAVINCI ROBOT;  Surgeon: José Miguel Villafuerte Jr., MD;  Location:  [facility];  Service: Robotics - DaVinci;  Laterality: Right;    OTHER SURGICAL HISTORY      Porcine Valve    PROSTATE SURGERY      PROSTATECTOMY  2000     History of Prostatectomy Perineal Radical    SKIN CANCER EXCISION      from head and lip    TONSILLECTOMY        General Information       Row Name 02/06/25 1528          OT Time and Intention    Document Type therapy note (daily note)  -SP     Mode of Treatment occupational therapy  -SP       Row Name 02/06/25 1528          General Information    Patient Profile Reviewed yes  -SP     Existing Precautions/Restrictions fall;oxygen therapy device and L/min  -SP       Row Name 02/06/25 1528          Cognition    Orientation Status (Cognition) oriented x 4  -SP       Row Name 02/06/25 1528          Safety Issues/Impairments Affecting Functional Mobility    Safety Issues Affecting Function (Mobility) safety  precaution awareness;safety precautions follow-through/compliance  -SP     Impairments Affecting Function (Mobility) endurance/activity tolerance;shortness of breath  -SP               User Key  (r) = Recorded By, (t) = Taken By, (c) = Cosigned By      Initials Name Provider Type    SP Sophia Saunders OT Occupational Therapist                     Mobility/ADL's       Row Name 02/06/25 1530          Bed Mobility    Comment, (Bed Mobility) pt sitting eob on arrival  -SP       Row Name 02/06/25 1530          Transfers    Transfers sit-stand transfer;toilet transfer  -SP       Row Name 02/06/25 1530          Sit-Stand Transfer    Sit-Stand [place] (Transfers) contact guard  -SP     Assistive Device (Sit-Stand Transfers) walker, front-wheeled  -SP       Row Name 02/06/25 1530          Toilet Transfer    Type (Toilet Transfer) sit-stand;stand-sit  -SP     [place] Level (Toilet Transfer) contact guard  -SP     Assistive Device (Toilet Transfer) walker, front-wheeled  -SP       Row Name 02/06/25 1530          Functional Mobility    Functional Mobility- Ind. Level contact guard assist  -SP     Functional Mobility- Device walker, front-wheeled  -SP     Functional Mobility-Distance (Feet) --  24ft + 24ft  -SP     Patient was able to Ambulate yes  -SP       Row Name 02/06/25 1530          Activities of Daily Living    BADL Assessment/Intervention lower body dressing;toileting  -SP       Row Name 02/06/25 1530          Lower Body Dressing Assessment/Training    [place] Level (Lower Body Dressing) don;shoes/slippers;minimum assist (75% patient effort);pants/bottoms;contact guard assist;verbal cues;set up  -SP     Position (Lower Body Dressing) unsupported sitting;supported standing  -SP       Row Name 02/06/25 1530          Toileting Assessment/Training    [place] Level (Toileting) contact guard assist  -SP     Assistive Devices (Toileting) commode  -SP     Position (Toileting) supported standing  -SP                User Key  (r) = Recorded By, (t) = Taken By, (c) = Cosigned By      Initials Name Provider Type    SP Sophia Saunders OT Occupational Therapist                   Obj/Interventions    No documentation.                  Goals/Plan    No documentation.                  Clinical Impression       Row Name 02/06/25 1532          Pain Assessment    Pretreatment Pain Rating 0/10 - no pain  -SP     Posttreatment Pain Rating 0/10 - no pain  -SP       Row Name 02/06/25 1532          Plan of Care Review    Plan of Care Reviewed With patient  -SP     Progress no change  -SP     Outcome Evaluation Pt was received eob and is agreeable to OT tx. He reports he is very fatigued from little sleep over the past few days and feel this is attributing to his increased fatigue and difficulty with mobility, He transferred to standing with CGA using the Rwx and increased time. Pt ambulated 24ft to the bathroom with CGA using the Rwx and he was able to transfer to the commode with CGA and doff his pants with CGA, doffed his brief with CGA and required min A to doff his shoes. Pt requried increased time on the commode to rest prior to changing his brief and re-donning his shoes and pants. He was able to don a clean brief and his pants with CGA and increased time and requried min A to don his shoes due to fatigue and feeling SOA. He transferred to standing with CGA using the Rwx and he completed chago hygeine in standing with CGA. Pt ambulated 24ft back to the chair using the Rwx with CGA and he was positioned for comfort in the chair with all needs in reach. Extensive discussion held regarding dc planning due to pt daughter concerns with his current lvl of function. Pt is expected to benefit from more assistance at the [facility] versus living independently for higher level activities due to his CHF related fatigue and he would benefit from follow up with HH PT/OT at the [facility]. Cont current POC and progress pt as tolerates.  -SP       Row Name  02/06/25 1532          Vital Signs    Pretreatment Heart Rate (beats/min) 90  -SP     Intratreatment Heart Rate (beats/min) 116  -SP     Posttreatment Heart Rate (beats/min) 93  -SP     Pre SpO2 (%) 98  -SP     O2 Delivery Pre Treatment supplemental O2  2L  -SP     Intra SpO2 (%) 95  -SP     O2 Delivery Intra Treatment supplemental O2  -SP     Post SpO2 (%) 97  -SP     O2 Delivery Post Treatment supplemental O2  -SP     Pre Patient Position Sitting  -SP     Intra Patient Position Standing  -SP     Post Patient Position Sitting  -SP       Row Name 02/06/25 1532          Positioning and Restraints    Pre-Treatment Position in bed  -SP     Post Treatment Position chair  -SP     In Chair sitting;call light within reach;encouraged to call for assist;notified nsg;with family/caregiver  -SP               User Key  (r) = Recorded By, (t) = Taken By, (c) = Cosigned By      Initials Name Provider Type    SP Sophia Saunders OT Occupational Therapist                   Outcome Measures       Row Name 02/06/25 1537          How much help from another is currently needed...    Putting on and taking off regular lower body clothing? 3  -SP     Bathing (including washing, rinsing, and drying) 3  -SP     Toileting (which includes using toilet bed pan or urinal) 3  -SP     Putting on and taking off regular upper body clothing 4  -SP     Taking care of personal grooming (such as brushing teeth) 4  -SP     Eating meals 4  -SP     AM-PAC 6 Clicks Score (OT) 21  -SP       Row Name 02/06/25 0800          How much help from another person do you currently need...    Turning from your back to your side while in flat bed without using bedrails? 4  -HH     Moving from lying on back to sitting on the side of a flat bed without bedrails? 4  -HH     Moving to and from a bed to a chair (including a wheelchair)? 4  -HH     Standing up from a chair using your arms (e.g., wheelchair, bedside chair)? 4  -HH     Climbing 3-5 steps with a railing? 2  -HH      To walk in hospital room? 3  -HH     AM-PAC 6 Clicks Score (PT) 21  -     Highest Level of Mobility Goal 6 --> Walk 10 steps or more  -       Row Name 02/06/25 1537          Functional Assessment    Outcome Measure Options AM-PAC 6 Clicks Daily Activity (OT)  -SP               User Key  (r) = Recorded By, (t) = Taken By, (c) = Cosigned By      Initials Name Provider Type    SP Sophia Saunders OT Occupational Therapist     Kirstin Thomas, RN Registered Nurse                    Occupational Therapy Education       Title: PT OT SLP Therapies (Resolved)       Topic: Occupational Therapy (Resolved)       Point: ADL training (Resolved)       Description:   Instruct learner(s) on proper safety adaptation and remediation techniques during self care or transfers.   Instruct in proper use of assistive devices.                  Learning Progress Summary            Patient Acceptance, E,TB, VU by  at 2/4/2025 1410    Comment: Role of OT/POC                      Point: Home exercise program (Resolved)       Description:   Instruct learner(s) on appropriate technique for monitoring, assisting and/or progressing therapeutic exercises/activities.                  Learner Progress:  Not documented in this visit.              Point: Precautions (Resolved)       Description:   Instruct learner(s) on prescribed precautions during self-care and functional transfers.                  Learner Progress:  Not documented in this visit.              Point: Body mechanics (Resolved)       Description:   Instruct learner(s) on proper positioning and spine alignment during self-care, functional mobility activities and/or exercises.                  Learner Progress:  Not documented in this visit.                              User Key       Initials Effective Dates Name Provider Type [facility] 06/16/21 -  Dary Ku Occupational Therapist OT                  OT Recommendation and Plan     Plan of Care Review  Plan of Care  Reviewed With: patient  Progress: no change  Outcome Evaluation: Pt was received eob and is agreeable to OT tx. He reports he is very fatigued from little sleep over the past few days and feel this is attributing to his increased fatigue and difficulty with mobility, He transferred to standing with CGA using the Rwx and increased time. Pt ambulated 24ft to the bathroom with CGA using the Rwx and he was able to transfer to the commode with CGA and doff his pants with CGA, doffed his brief with CGA and required min A to doff his shoes. Pt requried increased time on the commode to rest prior to changing his brief and re-donning his shoes and pants. He was able to don a clean brief and his pants with CGA and increased time and requried min A to don his shoes due to fatigue and feeling SOA. He transferred to standing with CGA using the Rwx and he completed chago hygeine in standing with CGA. Pt ambulated 24ft back to the chair using the Rwx with CGA and he was positioned for comfort in the chair with all needs in reach. Extensive discussion held regarding dc planning due to pt daughter concerns with his current lvl of function. Pt is expected to benefit from more assistance at the [facility] versus living independently for higher level activities due to his CHF related fatigue and he would benefit from follow up with HH PT/OT at the [facility]. Cont current POC and progress pt as tolerates.     Time Calculation:         Time Calculation- OT       Row Name 02/06/25 1541             Time Calculation- OT    OT Start Time 1349  -SP      OT Stop Time 1430  -SP      OT Time Calculation (min) 41 min  -SP      OT Received On 02/06/25  -SP      OT Goal Re-Cert Due Date 02/14/25  -SP         Timed Charges    34022 - OT Therapeutic Activity Minutes 11  -SP      20244 - OT Self Care/Mgmt Minutes 30  -SP         Total Minutes    Timed Charges Total Minutes 41  -SP       Total Minutes 41  -SP                User Key  (r) = Recorded By, (t) = Taken  By, (c) = Cosigned By      Initials Name Provider Type    SP Sophia Saunders OT Occupational Therapist                  Therapy Charges for Today       Code Description Service Date Service Provider Modifiers Qty    05844387635  OT THERAPEUTIC ACT EA 15 MIN 2/6/2025 Sophia Saunders OT GO 1    [id number]  OT SELF CARE/MGMT/TRAIN EA 15 MIN 2/6/2025 Sophia Saunders OT GO 2                 Sophia Saunders OT  2/6/2025

## 2025-02-06 NOTE — PLAN OF CARE
Goal Outcome Evaluation: Patient being discharged to [facility]

## 2025-02-06 NOTE — CASE MANAGEMENT/SOCIAL WORK
Order for Home Health Spoke to pt he has had [facility] in the past and is preferring them again Called referral to them   [facility] to accept [facility] since they are in network with his insurance they can accept

## 2025-02-06 NOTE — PLAN OF CARE
Goal Outcome Evaluation:               VSS on 3L NC.  No further complaints from patient at this time.  No acute events during shift.  POC ongoing.  D/C pending.

## 2025-02-06 NOTE — PROGRESS NOTES
"    [facility]     PALLIATIVE CARE FOLLOW UP NOTE    Name:  Blane Dietz   Age:  89 y.o.  Sex:  male  :  1935  MRN:  [id number]   Visit Number:  [id number]  Date Of Service:  25  Primary Care Physician:  David Em MD    Chief Complaint: Generalized weakness    Interval History:  Patient seen today during palliative care team rounds.  Record reviewed and case discussed with staff, MARY.  He is upright at bedside this morning, talking with his daughter on the phone.  PC team returned later, patient utilizing CPAP.  He is interactive and able to have conversations.  He reports continued interval improvements with his LE edema, dyspnea has resolved.  He is hopeful to discharge home today.      Review of Systems   Constitutional:  Positive for activity change.   Cardiovascular:  Positive for leg swelling.   Neurological:  Positive for weakness.             Vitals: /95 (BP Location: Right arm, Patient Position: Sitting)   Pulse 91   Temp 97.2 °F (36.2 °C) (Axillary)   Resp 22   Ht 167.6 cm (66\")   Wt 90.8 kg (200 lb 2.8 oz)   SpO2 98%   BMI 32.31 kg/m²     Physical Exam  Vitals and nursing note reviewed.   Constitutional:       General: He is not in acute distress.     Appearance: He is obese. He is not diaphoretic.   HENT:      Head: Normocephalic and atraumatic.      Mouth/Throat:      Comments: CPAP  Eyes:      Pupils: Pupils are equal, round, and reactive to light.   Cardiovascular:      Comments: Appears well perfused   Pulmonary:      Effort: Pulmonary effort is normal. No respiratory distress.      Breath sounds: No wheezing or rales.   Musculoskeletal:         General: Swelling (interval improvements) present. Normal range of motion.      Cervical back: Neck supple.   Skin:     General: Skin is warm and dry.      Capillary Refill: Capillary refill takes less than 2 seconds.   Neurological:      Mental Status: He is alert and oriented to person, place, and time. "   Psychiatric:         Mood and Affect: Mood normal.         Behavior: Behavior normal.          Results Reviewed:    Intake/Output Summary (Last 24 hours) at 2/6/2025 0801  Last data filed at 2/6/2025 0600  Gross per 24 hour   Intake 360 ml   Output 400 ml   Net -40 ml     Results from last 7 days   Lab Units 02/06/25  0547 02/05/25  0640 02/04/25  0553   SODIUM mmol/L 139   < > 139   POTASSIUM mmol/L 4.9   < > 5.3*   CHLORIDE mmol/L 102   < > 104   CO2 mmol/L 23.8   < > 22.2   BUN mg/dL 55*   < > 40*   CREATININE mg/dL 2.23*   < > 2.05*   CALCIUM mg/dL 8.3*   < > 8.0*   BILIRUBIN mg/dL  --   --  1.5*   ALK PHOS U/L  --   --  132*   ALT (SGPT) U/L  --   --  19   AST (SGOT) U/L  --   --  38   GLUCOSE mg/dL 95   < > 110*    < > = values in this interval not displayed.     Results from last 7 days   Lab Units 02/06/25  0547   WBC 10*3/mm3 8.70   HEMOGLOBIN g/dL 14.1   HEMATOCRIT % 44.0   PLATELETS 10*3/mm3 151       Medication Review:   I have reviewed the patients active and prn medications.     Palliative Care Assessment:  HFrEF with acute exacerbation  Atrial flutter with RVR  Valvular heart disease  History of prostate cancer  History of B cell lymphoma  CKD stg III  T2DM  Impaired mobility and ADLs    Recommendations/Plan:  - GOC discussions yield patient with desire to pursue plan in place with intent for recovery, anticipate discharge back to [facility] today  - Discussed CODE STATUS in detail, he desires to maintain FULL CODE for now, however shares this is temporary and will likely reinstate DNR status following completion of taxes   - May benefit from ongoing PT/OT upon discharge back to [facility], he is ambulatory with use of a walker and working with therapy services  - Continued to encourage medication compliance   - Patient is scheduled to see Dr. Toussaint, Thursday as outpatient   - Palliative care will continue to follow/support patient and family      CODE STATUS:   Code Status and Medical Interventions: CPR  (Attempt to Resuscitate); Full Support   Ordered at: 02/03/25 0647     Code Status (Patient has no pulse and is not breathing):    CPR (Attempt to Resuscitate)     Medical Interventions (Patient has pulse or is breathing):    Full Support         I spent 25 total minutes, including face to face assessment, record review, coordination of care with staff, and counseling patient and/or family  Part of this note may be an electronic transcription/translation of spoken language to printed text using the Dragon Dictation System.    Jimena Silvestre PA-C  02/06/25  08:01 EST

## 2025-02-07 ENCOUNTER — TRANSITIONAL CARE MANAGEMENT TELEPHONE ENCOUNTER (OUTPATIENT)
Dept: CALL CENTER | Facility: HOSPITAL | Age: 89
End: 2025-02-07
Payer: MEDICARE

## 2025-02-07 LAB
QT INTERVAL: 370 MS
QTC INTERVAL: 531 MS

## 2025-02-07 NOTE — OUTREACH NOTE
Call Center TCM Note      Flowsheet Row Responses   [facility] patient discharged from? [facility]   Does the patient have one of the following disease processes/diagnoses(primary or secondary)? Other   TCM attempt successful? Yes   Call start time 1032   Call end time 1036   Discharge diagnosis Acute on chronic systolic heart failure with reduced EF, POA  Acute on chronic hypoxic respiratory failure secondary to above, POA  Atrial flutter with RVR, POA   Is patient permission given to speak with other caregiver? Yes   Person spoke with today (if not patient) and relationship Susana Dietz Power of    Comments PCP DR Em. [facility] follow up appt in place for 2/18  1115am with Jeniffer VILLA. Daughter states to leave appt in place at this time. She will reach out to office at later time to discuss any needs.   Does the patient have an appointment with their PCP within 7-14 days of discharge? Yes   TCM call completed? Yes   Wrap up additional comments Brief call with daughter. She reports that she has appointment with [facility] today for patient to be admitted there instead of being at [facility].   Call end time 1036            Mary Escalante RN    2/7/2025, 10:40 EST

## 2025-02-07 NOTE — PLAN OF CARE
Goal Outcome Evaluation:      Pt discharged back to [facility] 02/26/2025 @ 5003 No Palliative Care referral

## 2025-02-10 ENCOUNTER — SPECIALTY PHARMACY (OUTPATIENT)
Facility: HOSPITAL | Age: 89
End: 2025-02-10
Payer: MEDICARE

## 2025-02-10 NOTE — PROGRESS NOTES
Specialty Pharmacy Patient Management Program  Oncology 6-Month Clinical Assessment       Blane Dietz is a 89 y.o. male with prostate cancer seen today to assess adherence and side effects.    Reason for Outreach: Routine medication check-in .    Regimen: Enzalutamide 120mg PO daily    Specialty Pharmacy Goal   Goals Addressed Today        Specialty Pharmacy General Goal      Clinical goal/therapeutic target: stable or decreasing PSA and disease control, per the recent oncology clinic notes and labs. {  PSA   Date Value Ref Range Status   07/17/2024 0.130 0.000 - 4.000 ng/mL Final   06/19/2024 0.131 0.000 - 4.000 ng/mL Final   05/22/2024 0.147 0.000 - 4.000 ng/mL Final   04/24/2024 0.151 0.000 - 4.000 ng/mL Final   02/21/2024 0.180 0.000 - 4.000 ng/mL Final   01/10/2024 0.265 0.000 - 4.000 ng/mL Final   12/13/2023 0.367 0.000 - 4.000 ng/mL Final   11/01/2023 1.290 0.000 - 4.000 ng/mL Final   09/27/2023 51.400 (H) 0.000 - 4.000 ng/mL Final   03/30/2022 3.580 0.000 - 4.000 ng/mL Final     PSA          6/19/2024    14:58 7/17/2024    11:35 8/14/2024    14:18   PSA   PSA 0.131  0.130  0.212       Latest Reference Range & Units 10/16/24 15:07 11/13/24 14:17   PSA 0.000 - 4.000 ng/mL 0.894 1.370       2/10/25: I have reviewed the most recent clinic note and labs. Dr. Ramon recommends continuing enzalutamide at this time. The patient is tolerating the medication and is currently on track for goal.  Medication is preventing disease progression as evidenced by imaging or provider/clinic documentation.            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    PICC (peripherally inserted central catheter) flush Z45.2    History of pulmonary embolism Z86.711    History of malignant neoplasm of prostate Z85.46    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    Stage 3b chronic kidney disease N18.32    Pneumonia due to COVID-19 virus U07.1, J12.82    Abrasion T14.8XXA    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) I50.23    Valvular heart disease I38    Atrial arrhythmia I49.8    RBBB I45.10    Atrial fibrillation with rapid ventricular response I48.91    Acute HFrEF (heart failure with reduced ejection fraction) I50.21       Medication Assessment for Specialty Medication(s):  Medication Assessment  Follow Up Clinical Assessment  Linked Medication(s) Assessed: Enzalutamide (XTANDI)  Therapeutic appropriateness: Appropriate  Medication tolerability: Tolerating with no to minimal ADRs  Medication plan: Continue therapy with normal follow-up  Quality of Life Improvement Scale: 10-Significantly better  Administration: Patient is taking every day at the same time .   Patient can self administer medications: yes  Medication Follow-Up Plan: Next clinical assessment and Refill coordination  Lab Review: The labs listed below have been reviewed. No dose adjustments are needed for the oral specialty medication(s) based on the labs.    Lab Results   Component Value Date    GLUCOSE 95 02/06/2025    CALCIUM 8.3 (L) 02/06/2025     02/06/2025    K 4.9 02/06/2025    CO2 23.8 02/06/2025     02/06/2025    BUN 55 (H) 02/06/2025    CREATININE 2.23 (H) 02/06/2025    EGFRIFAFRI 95 10/14/2020    EGFRIFNONA 55 (L) 02/07/2022    BCR 24.7 02/06/2025    ANIONGAP 13.2 02/06/2025     Lab Results   Component Value Date    WBC 8.70 02/06/2025    RBC 4.70 02/06/2025    HGB 14.1 02/06/2025    HCT 44.0 02/06/2025    MCV 93.6 02/06/2025    MCH 30.0 02/06/2025    MCHC 32.0  02/06/2025    RDW 15.6 (H) 02/06/2025    RDWSD 53.4 02/06/2025    MPV 11.2 02/06/2025     02/06/2025    NEUTRORELPCT 72.8 02/06/2025    LYMPHORELPCT 13.6 (L) 02/06/2025    MONORELPCT 9.9 02/06/2025    EOSRELPCT 1.3 02/06/2025    BASORELPCT 1.0 02/06/2025    AUTOIGPER 1.4 (H) 02/06/2025    NEUTROABS 6.34 02/06/2025    LYMPHSABS 1.18 02/06/2025    MONOSABS 0.86 02/06/2025    EOSABS 0.11 02/06/2025    BASOSABS 0.09 02/06/2025    AUTOIGNUM 0.12 (H) 02/06/2025    NRBC 0.0 02/06/2025     Drug-drug interactions  Completed medication reconciliation today to assess for drug interactions. Patient denies starting or stopping any medications.    Assessed medication list for interactions,  Enzalutamide may decrease the serum concentration of Apixaban and amiodarone  Advised patient to call the clinic if any new medications are started so we can assess for drug-drug interactions.  Drug-food interactions discussed: eating grapefruit and drinking grapefruit juice, eating Aurora oranges (commonly found in orange marmalade), and eating starfruit  Vaccines are coordinated by the patient's oncologist and primary care provider.    Medications   Medicines reviewed by Kim Alaniz, PharmD on 2/10/2025 at  5:26 PM  Prior to Admission medications    Medication Sig Start Date End Date Taking? Authorizing Provider   allopurinol (ZYLOPRIM) 100 MG tablet Take 1 tablet by mouth Daily. Hold for now.  Can restart Sunday.  Indications: Gout 2/6/25   Abbey Cruz APRN   amiodarone (PACERONE) 200 MG tablet Take 1 tablet by mouth Daily. 12/5/22   Ha Toussaint MD   apixaban (ELIQUIS) 2.5 MG tablet tablet Take 1 tablet by mouth Every 12 (Twelve) Hours for 30 days. Indications: Atrial Fibrillation 1/23/25 2/22/25  David Em MD   Apoaequorin (PREVAGEN PO) Take  by mouth.    Provider, Dave, MD   ascorbic acid (VITAMIN C) 1000 MG tablet Take 1 tablet by mouth Daily.    Provider, MD Dave   Calcium Carb-Cholecalciferol  "600-5 MG-MCG tablet Take 1 tablet by mouth Every Other Day. Indications: Low Amount of Calcium in the Blood 7/17/24   Olimpia Real APRN   CINNAMON PO Take 1 tablet by mouth Daily. Patient unsure of dose    Dave Devi MD   Droplet Insulin Syringe 31G X 5/16\" 0.5 ML misc  4/24/24   Dave Devi MD   empagliflozin (Jardiance) 25 MG tablet tablet Take 1 tablet by mouth Daily. Hold for now.  Can restart Sunday. 2/6/25   Abbey Cruz APRN   enzalutamide (XTANDI) 40 MG tablet tablet Take 3 tablets by mouth Daily. 9/25/24   Shannan Ramon MD   fluticasone (Flonase) 50 MCG/ACT nasal spray Administer 2 sprays into the nostril(s) as directed by provider Daily As Needed for Rhinitis or Allergies. 2/6/25   Abbey Cruz APRN   furosemide (LASIX) 40 MG tablet Take 1 tablet by mouth Daily. Hold for now.  Can restart Sunday. 2/6/25   Abbye Cruz APRN   glucose blood (True Metrix Blood Glucose Test) test strip Use to test blood sugar 3 times daily.  Dx E11.65 4/30/24   Sophia Ferreira PA   Lantus 100 UNIT/ML injection Inject up to 50 units daily subcutaneously  Patient taking differently: Inject up to 20 units daily subcutaneously 7/21/23   Xavier Boston MD   metoprolol succinate XL (TOPROL-XL) 25 MG 24 hr tablet Take 1 tablet by mouth Daily. 1/23/25   David Em MD   pravastatin (PRAVACHOL) 40 MG tablet TAKE ONE TABLET BY MOUTH EVERY NIGHT AT BEDTIME 10/7/24   David Em MD   sacubitril-valsartan (Entresto) 24-26 MG tablet Take 1 tablet by mouth Every 12 (Twelve) Hours. Hold for now- kidneys unable to tolerate  Indications: Cardiac Failure 2/6/25   Abbey Cruz APRN   VITAMIN D, CHOLECALCIFEROL, PO Take 1,000 Units by mouth Daily.    ProviderDave MD   allopurinol (ZYLOPRIM) 100 MG tablet TAKE 1 TABLET BY MOUTH DAILY 10/7/24 2/6/25  David Em MD   apixaban (ELIQUIS) 5 MG tablet tablet Take 1 tablet by mouth Every 12 (Twelve) Hours for 30 days. " Indications: Atrial Fibrillation 12/23/24 1/16/25  Deanne Bowles MD   apixaban (ELIQUIS) 5 MG tablet tablet Take 1 tablet by mouth Every 12 (Twelve) Hours for 30 days. Indications: Atrial Fibrillation 1/16/25 1/23/25  Luis Bustamante DO   ciclopirox (LOPROX) 1 % shampoo  8/21/24 2/6/25  ProviderDave MD   Dulaglutide (Trulicity) 3 MG/0.5ML solution pen-injector Inject 0.5 mL under the skin into the appropriate area as directed 1 (One) Time Per Week.  Patient not taking: Reported on 2/4/2025 7/25/24 2/6/25  David Em MD   Entresto 24-26 MG tablet TAKE ONE TABLET BY MOUTH EVERY 12 HOURS 10/24/24 2/6/25  David Em MD   fluocinonide (LIDEX) 0.05 % external solution  8/19/24 2/6/25  Provider, MD Dave   fluticasone (FLONASE) 50 MCG/ACT nasal spray 2 sprays into the nostril(s) as directed by provider Daily. 2 puffs each nostril  Patient taking differently: Administer 2 sprays into the nostril(s) as directed by provider Daily As Needed for Rhinitis or Allergies. 12/20/19 2/6/25  Rosanna Nicolas APRN   furosemide (LASIX) 20 MG tablet Take 1 tablet by mouth 2 (Two) Times a Day for 3 days. 12/23/24 1/16/25  Deanne Bowles MD   furosemide (LASIX) 20 MG tablet Take 1 tablet by mouth 2 (Two) Times a Day for 14 days. Take twice a day until swelling improves and then take once daily after that 1/16/25 1/23/25  Luis Bustamante DO   furosemide (LASIX) 40 MG tablet Take 1 tablet by mouth Daily. Take twice a day until swelling improves and then take once daily after that 1/23/25 2/6/25  David Em MD   Jardiance 25 MG tablet tablet TAKE 1 TABLET BY MOUTH DAILY 10/7/24 2/6/25  David Em MD   ketoconazole (NIZORAL) 2 % shampoo Apply 1 application  topically to the appropriate area as directed 2 (Two) Times a Week. Indications: Tinea Versicolor  Patient not taking: Reported on 2/4/2025 5/26/23 2/6/25  Provider, MD Dave   metoprolol succinate XL (TOPROL-XL) 25  MG 24 hr tablet TAKE 1/2 TABLET BY MOUTH DAILY 10/7/24 1/23/25  David Em MD   multivitamin with minerals tablet tablet Take 1 tablet by mouth Daily. AREDS 2  Indications: vitamin deficiency  Patient not taking: Reported on 2/4/2025 2/6/25  Dave Devi MD   potassium chloride 10 MEQ CR tablet Take 2 tablets by mouth Daily for 21 days. 1/16/25 2/6/25  Luis Bustamante DO   triamcinolone (KENALOG) 0.1 % cream Apply 1 Application topically to the appropriate area as directed 2 (Two) Times a Day.  Patient not taking: Reported on 2/4/2025 2/6/25  ProviderDave MD       Allergies  Known allergies and reactions were discussed with the patient. The patient's chart has been reviewed for allergy information and updated as necessary.   Allergies   Allergen Reactions    Ampicillin Anaphylaxis    Medrol [Methylprednisolone] Unknown - High Severity     Made his blood sugar get really high, can't take this    Myrbetriq [Mirabegron] Unknown - High Severity     High BP    Penicillins Anaphylaxis and Shortness Of Breath     Beta lactam allergy details  Antibiotic reaction: (!) shortness of breath  Age at reaction: adult  Dose to reaction time: (!) minutes  Reason for antibiotic: other  Epinephrine required for reaction?: (!) yes           Bee Venom Swelling    Melatonin Hallucinations         Allergies reviewed by Kim Alaniz, PharmD on 2/10/2025 at  5:26 PM    Hospitalizations and Urgent Care Visits Since Last Assessment:  Unplanned hospitalizations or inpatient admissions:   2/3/25 - 2/6/25: atrial flutter  12/21/24 - 12/23/24: atrial flutter  ED Visits: 1/16/25: atrial fibrillation  Urgent Office Visits: 0    Adherence Assessment:  Adherence Questions  Linked Medication(s) Assessed: Enzalutamide (XTANDI)  On average, how many doses/injections does the patient miss per month?: 0  What are the identified reasons for non-adherence or missed doses? : no problems identified  What is the estimated  medication adherence level?: %  Based on the patient/caregiver response and refill history, does this patient require an MTP to track adherence improvements?: no    Quality of Life Assessment:  Quality of Life Assessment  Quality of Life Improvement Scale: 10-Significantly better    Financial Assessment:  Medication availability/affordability: Patient has had no issues obtaining medication from pharmacy.    Attestation:  I attest the patient was actively involved in and has agreed to the above plan of care. If the prescribed therapy is at any point deemed not appropriate based on the current or future assessments, a consultation will be initiated with the patient's specialty care provider to determine the best course of action. The revised plan of therapy will be documented along with any required assessments and/or additional patient education provided.       All questions addressed and patient had no additional concerns. Patient has pharmacy contact information.    Kim Alaniz PharmD, [facility]  Clinical Oncology Pharmacy Specialist  Phone: [phone]      2/10/2025  17:28 EST

## 2025-02-24 ENCOUNTER — OUTSIDE FACILITY SERVICE (OUTPATIENT)
Dept: INTERNAL MEDICINE | Facility: CLINIC | Age: 89
End: 2025-02-24
Payer: MEDICARE

## 2025-02-26 ENCOUNTER — HOSPITAL ENCOUNTER (OUTPATIENT)
Facility: HOSPITAL | Age: 89
Discharge: HOME | End: 2025-02-26
Payer: MEDICARE

## 2025-02-27 ENCOUNTER — TELEPHONE (OUTPATIENT)
Dept: INTERNAL MEDICINE | Facility: CLINIC | Age: 89
End: 2025-02-27
Payer: MEDICARE

## 2025-02-27 NOTE — TELEPHONE ENCOUNTER
[facility]    Relationship: Home Health    Best call back number: [phone]     What is the best time to reach you: ANYTIME, OK TO LEAVE VOICEMAIL.    Who are you requesting to speak with (clinical staff, provider,  specific staff member): CLINICAL STAFF    Do you know the name of the person who called: NAHOMY    What was the call regarding: NAHOMY IS CALLING TO NOTIFY PROVIDER THAT PATIENT HAS A NEW WOULD TO HIS RIGHT LOWER LEG THAT LOOKS LIKE AN ULCER AND WOULD LIKE TO GET VERBAL ORDERS FOR WOUND CARE.    Is it okay if the provider responds through MyChart: NO CALL ONLY

## 2025-03-12 ENCOUNTER — TELEPHONE (OUTPATIENT)
Dept: INTERNAL MEDICINE | Facility: CLINIC | Age: 89
End: 2025-03-12
Payer: MEDICARE

## 2025-03-12 ENCOUNTER — SPECIALTY PHARMACY (OUTPATIENT)
Dept: ONCOLOGY | Facility: HOSPITAL | Age: 89
End: 2025-03-12
Payer: MEDICARE

## 2025-03-12 NOTE — TELEPHONE ENCOUNTER
Caller: JERSON WITH [facility]    Relationship: Home Health    Best call back number: [phone]     What is the best time to reach you: ANYTIME BEFORE 4:30 PM    Who are you requesting to speak with (clinical staff, provider,  specific staff member): CLINICAL    What was the call regarding: PATIENT HAS NOT BEEN TAKING HIS FUROSEMIDE AS PRESCRIBED. HE HAS HAD VERY LITTLE URINE OUTPUT AND NOT DRINKING ENOUGH WATER. TEMP 95.7. HE HAS INCREASED HIS ANKLE CIRCUMFERENCE, SO HE IS RETAINING WATER AS WELL.  JERSON RECOMMENDED THAT HE GO THE ER, BECAUSE OF THE SMALL AMOUNT OF URINE OUTPUT. HE HAS REFUSED. THE DAUGHTER IS AWARE.

## 2025-03-12 NOTE — TELEPHONE ENCOUNTER
Patient had not taken Lasix since Monday. He was not having any urine output. Patient is drinking water, he took his Lasix. I called and spoke with him he states he has corrected the problem and will not go back to ER. He states they don't do anything to help.

## 2025-03-12 NOTE — PROGRESS NOTES
"   Specialty Pharmacy Patient Management Program  Refill Outreach     Blane \"CHEO\" was contacted today regarding refills of their medication(s). Patient states he has several weeks of Xtandi left, which is unlikely based on previous refill coordinations. Patient has stated on previous calls that he is good for now and to call back in a week. Patient could not give me and exact count of how much medication he has left.    Refill Questions      Flowsheet Row Most Recent Value   Changes to allergies? No   Changes to medications? No   New conditions or infections since last clinic visit No   If yes, please describe  n/a   Unplanned office visit, urgent care, ED, or hospital admission in the last 4 weeks  No   How does patient/caregiver feel medication is working? Good   Financial problems or insurance changes  No   Since the previous refill, were any specialty medication doses or scheduled injections missed or delayed?  Yes   If yes, please provide the amount Uknown,  patient says he still has a couple of weeks left (based on previous refill coordinations, this is unlikely)   Why were doses missed? Unknown   Does this patient require a clinical escalation to a pharmacist? Yes             Follow-up: 30 day(s)     Blas Calle, Pharmacy Technician  3/12/2025  09:27 EDT    "

## 2025-03-12 NOTE — PROGRESS NOTES
Javier Calle, Pharmacy Care Coordinator, called the patient for a refill coordination for Xtandi today. The patient reported he does not need a refill for Xtandi at this time. The patient's Xtandi PDC (proportion of days covered) is 91%, and the patient filled a 30-day supply of Xtandi in late August 2024, late September 2024, early November 2024, middle of December 2024, and end of January 2025.     I called the patient on 3/12/25 to discuss adherence. The patient reports he is currently taking Xtandi 40 mg - three tablets by mouth daily (120 mg total) as prescribed. The patient reports he has recently forgotten to take a few doses of Xtandi. The patient reports he takes Xtandi at night. I reviewed some adherence strategies with the patient to improve adherence with Xtandi, including setting alarms on his phone, using written reminders, or keeping the Xtandi prescription bottle by his nightstand. The patient denies any questions at this time.    Luzma Morley, PharmD  [facility] Oncology Pharmacy Specialist  [phone]

## 2025-03-31 ENCOUNTER — SPECIALTY PHARMACY (OUTPATIENT)
Dept: ONCOLOGY | Facility: HOSPITAL | Age: 89
End: 2025-03-31
Payer: MEDICARE

## 2025-03-31 NOTE — PROGRESS NOTES
"   Specialty Pharmacy Patient Management Program  Refill Outreach     Blane \"CHEO\" was contacted today regarding refills of their medication(s). He requested a call back in two weeks. Patient last filled Xtandi on 1/29/2025.    Refill Questions      Flowsheet Row Most Recent Value   Changes to allergies? No   Changes to medications? No   New conditions or infections since last clinic visit No   If yes, please describe  n/a   Unplanned office visit, urgent care, ED, or hospital admission in the last 4 weeks  No   How does patient/caregiver feel medication is working? Good   Financial problems or insurance changes  No   Since the previous refill, were any specialty medication doses or scheduled injections missed or delayed?  Yes   If yes, please provide the amount patient has missed an unknown amount of medication   Why were doses missed? patient has not had xtandi filled since 1/29/2025.   Does this patient require a clinical escalation to a pharmacist? Yes                     Follow-up: 30 day(s)     Blas Calle, Pharmacy Technician  3/31/2025  09:15 EDT    "

## 2025-04-03 ENCOUNTER — TELEPHONE (OUTPATIENT)
Dept: INTERNAL MEDICINE | Facility: CLINIC | Age: 89
End: 2025-04-03
Payer: MEDICARE

## 2025-04-03 NOTE — TELEPHONE ENCOUNTER
Spoke with POA and patient was not taking his medicine for 3 weeks, now taking it again, they cannot get him to leave his appt in which he hasn't done in 6 weeks. She stated he won't go to the ER. I tried to call and discuss with patient but he didn't answer. POA stated he was up all nght with no rest and very anxious.

## 2025-04-03 NOTE — TELEPHONE ENCOUNTER
Andra with [facility] called and stated that Mr Dietz's heart rate is 143 bpm and that his bp is lower than what's normal for him 107/62. Andra suggested ER and he denied so she reached out to us to schedule for him. Andra is done with him for the day, but says we can call her back at anytime if need be. [phone]. Please advise.

## 2025-04-06 ENCOUNTER — LAB REQUISITION (OUTPATIENT)
Dept: LAB | Facility: HOSPITAL | Age: 89
DRG: 177 | End: 2025-04-06
Payer: MEDICARE

## 2025-04-06 DIAGNOSIS — N39.0 URINARY TRACT INFECTION, SITE NOT SPECIFIED: ICD-10-CM

## 2025-04-06 LAB
BACTERIA UR QL AUTO: ABNORMAL /HPF
BILIRUB UR QL STRIP: NEGATIVE
CLARITY UR: ABNORMAL
COLOR UR: ABNORMAL
GLUCOSE UR STRIP-MCNC: NEGATIVE MG/DL
HGB UR QL STRIP.AUTO: NEGATIVE
HYALINE CASTS UR QL AUTO: ABNORMAL /LPF
KETONES UR QL STRIP: NEGATIVE
LEUKOCYTE ESTERASE UR QL STRIP.AUTO: ABNORMAL
NITRITE UR QL STRIP: NEGATIVE
PH UR STRIP.AUTO: <=5 [PH] (ref 5–8)
PROT UR QL STRIP: ABNORMAL
RBC # UR STRIP: ABNORMAL /HPF
REF LAB TEST METHOD: ABNORMAL
SP GR UR STRIP: 1.02 (ref 1–1.03)
SQUAMOUS #/AREA URNS HPF: ABNORMAL /HPF
TRANS CELLS #/AREA URNS HPF: ABNORMAL /HPF
UROBILINOGEN UR QL STRIP: ABNORMAL
WBC # UR STRIP: ABNORMAL /HPF

## 2025-04-06 PROCEDURE — 87086 URINE CULTURE/COLONY COUNT: CPT | Performed by: HOSPITALIST

## 2025-04-06 PROCEDURE — 81001 URINALYSIS AUTO W/SCOPE: CPT | Performed by: HOSPITALIST

## 2025-04-07 ENCOUNTER — APPOINTMENT (OUTPATIENT)
Dept: CT IMAGING | Facility: HOSPITAL | Age: 89
DRG: 177 | End: 2025-04-07
Payer: MEDICARE

## 2025-04-07 ENCOUNTER — APPOINTMENT (OUTPATIENT)
Dept: GENERAL RADIOLOGY | Facility: HOSPITAL | Age: 89
DRG: 177 | End: 2025-04-07
Payer: MEDICARE

## 2025-04-07 ENCOUNTER — TELEPHONE (OUTPATIENT)
Dept: INTERNAL MEDICINE | Facility: CLINIC | Age: 89
End: 2025-04-07
Payer: MEDICARE

## 2025-04-07 ENCOUNTER — HOSPITAL ENCOUNTER (INPATIENT)
Facility: HOSPITAL | Age: 89
DRG: 177 | End: 2025-04-07
Attending: STUDENT IN AN ORGANIZED HEALTH CARE EDUCATION/TRAINING PROGRAM | Admitting: STUDENT IN AN ORGANIZED HEALTH CARE EDUCATION/TRAINING PROGRAM
Payer: MEDICARE

## 2025-04-07 DIAGNOSIS — E11.65 UNCONTROLLED TYPE 2 DIABETES MELLITUS WITH HYPERGLYCEMIA: ICD-10-CM

## 2025-04-07 DIAGNOSIS — R57.9 SHOCK: ICD-10-CM

## 2025-04-07 DIAGNOSIS — U07.1 COVID-19: Primary | ICD-10-CM

## 2025-04-07 DIAGNOSIS — I48.91 ATRIAL FIBRILLATION WITH RAPID VENTRICULAR RESPONSE: ICD-10-CM

## 2025-04-07 LAB
ALBUMIN SERPL-MCNC: 4 G/DL (ref 3.5–5.2)
ALBUMIN/GLOB SERPL: 1.5 G/DL
ALP SERPL-CCNC: 184 U/L (ref 39–117)
ALT SERPL W P-5'-P-CCNC: 77 U/L (ref 1–41)
ANION GAP SERPL CALCULATED.3IONS-SCNC: 15.5 MMOL/L (ref 5–15)
AST SERPL-CCNC: 88 U/L (ref 1–40)
B PARAPERT DNA SPEC QL NAA+PROBE: NOT DETECTED
B PERT DNA SPEC QL NAA+PROBE: NOT DETECTED
BACTERIA SPEC AEROBE CULT: NORMAL
BASOPHILS # BLD AUTO: 0.03 10*3/MM3 (ref 0–0.2)
BASOPHILS NFR BLD AUTO: 0.4 % (ref 0–1.5)
BILIRUB SERPL-MCNC: 1.3 MG/DL (ref 0–1.2)
BUN SERPL-MCNC: 95 MG/DL (ref 8–23)
BUN/CREAT SERPL: 33.6 (ref 7–25)
C PNEUM DNA NPH QL NAA+NON-PROBE: NOT DETECTED
CALCIUM SPEC-SCNC: 8 MG/DL (ref 8.6–10.5)
CHLORIDE SERPL-SCNC: 94 MMOL/L (ref 98–107)
CO2 SERPL-SCNC: 21.5 MMOL/L (ref 22–29)
CREAT SERPL-MCNC: 2.83 MG/DL (ref 0.76–1.27)
D-LACTATE SERPL-SCNC: 2.2 MMOL/L (ref 0.5–2)
D-LACTATE SERPL-SCNC: 2.7 MMOL/L (ref 0.5–2)
DEPRECATED RDW RBC AUTO: 49.1 FL (ref 37–54)
EGFRCR SERPLBLD CKD-EPI 2021: 20.9 ML/MIN/1.73
EOSINOPHIL # BLD AUTO: 0.04 10*3/MM3 (ref 0–0.4)
EOSINOPHIL NFR BLD AUTO: 0.6 % (ref 0.3–6.2)
ERYTHROCYTE [DISTWIDTH] IN BLOOD BY AUTOMATED COUNT: 15.6 % (ref 12.3–15.4)
FLUAV SUBTYP SPEC NAA+PROBE: NOT DETECTED
FLUBV RNA ISLT QL NAA+PROBE: NOT DETECTED
GEN 5 1HR TROPONIN T REFLEX: 41 NG/L
GLOBULIN UR ELPH-MCNC: 2.7 GM/DL
GLUCOSE SERPL-MCNC: 146 MG/DL (ref 65–99)
HADV DNA SPEC NAA+PROBE: NOT DETECTED
HCOV 229E RNA SPEC QL NAA+PROBE: NOT DETECTED
HCOV HKU1 RNA SPEC QL NAA+PROBE: NOT DETECTED
HCOV NL63 RNA SPEC QL NAA+PROBE: NOT DETECTED
HCOV OC43 RNA SPEC QL NAA+PROBE: NOT DETECTED
HCT VFR BLD AUTO: 37.1 % (ref 37.5–51)
HGB BLD-MCNC: 12.2 G/DL (ref 13–17.7)
HMPV RNA NPH QL NAA+NON-PROBE: NOT DETECTED
HOLD SPECIMEN: NORMAL
HOLD SPECIMEN: NORMAL
HPIV1 RNA ISLT QL NAA+PROBE: NOT DETECTED
HPIV2 RNA SPEC QL NAA+PROBE: NOT DETECTED
HPIV3 RNA NPH QL NAA+PROBE: NOT DETECTED
HPIV4 P GENE NPH QL NAA+PROBE: NOT DETECTED
IMM GRANULOCYTES # BLD AUTO: 0.1 10*3/MM3 (ref 0–0.05)
IMM GRANULOCYTES NFR BLD AUTO: 1.5 % (ref 0–0.5)
LYMPHOCYTES # BLD AUTO: 0.92 10*3/MM3 (ref 0.7–3.1)
LYMPHOCYTES NFR BLD AUTO: 13.4 % (ref 19.6–45.3)
M PNEUMO IGG SER IA-ACNC: NOT DETECTED
MAGNESIUM SERPL-MCNC: 2.8 MG/DL (ref 1.6–2.4)
MCH RBC QN AUTO: 29.2 PG (ref 26.6–33)
MCHC RBC AUTO-ENTMCNC: 32.9 G/DL (ref 31.5–35.7)
MCV RBC AUTO: 88.8 FL (ref 79–97)
MONOCYTES # BLD AUTO: 0.88 10*3/MM3 (ref 0.1–0.9)
MONOCYTES NFR BLD AUTO: 12.8 % (ref 5–12)
NEUTROPHILS NFR BLD AUTO: 4.9 10*3/MM3 (ref 1.7–7)
NEUTROPHILS NFR BLD AUTO: 71.3 % (ref 42.7–76)
NRBC BLD AUTO-RTO: 0 /100 WBC (ref 0–0.2)
NT-PROBNP SERPL-MCNC: ABNORMAL PG/ML (ref 0–1800)
PHOSPHATE SERPL-MCNC: 6.4 MG/DL (ref 2.5–4.5)
PLATELET # BLD AUTO: 137 10*3/MM3 (ref 140–450)
PMV BLD AUTO: 10.9 FL (ref 6–12)
POTASSIUM SERPL-SCNC: 5 MMOL/L (ref 3.5–5.2)
PROCALCITONIN SERPL-MCNC: 0.25 NG/ML (ref 0–0.25)
PROT SERPL-MCNC: 6.7 G/DL (ref 6–8.5)
RBC # BLD AUTO: 4.18 10*6/MM3 (ref 4.14–5.8)
RHINOVIRUS RNA SPEC NAA+PROBE: NOT DETECTED
RSV RNA NPH QL NAA+NON-PROBE: NOT DETECTED
SARS-COV-2 RNA RESP QL NAA+PROBE: DETECTED
SODIUM SERPL-SCNC: 131 MMOL/L (ref 136–145)
TROPONIN T % DELTA: -59
TROPONIN T NUMERIC DELTA: -60 NG/L
TROPONIN T SERPL HS-MCNC: 101 NG/L
WBC NRBC COR # BLD AUTO: 6.87 10*3/MM3 (ref 3.4–10.8)
WHOLE BLOOD HOLD COAG: NORMAL
WHOLE BLOOD HOLD SPECIMEN: NORMAL

## 2025-04-07 PROCEDURE — 99285 EMERGENCY DEPT VISIT HI MDM: CPT | Performed by: STUDENT IN AN ORGANIZED HEALTH CARE EDUCATION/TRAINING PROGRAM

## 2025-04-07 PROCEDURE — 85025 COMPLETE CBC W/AUTO DIFF WBC: CPT | Performed by: STUDENT IN AN ORGANIZED HEALTH CARE EDUCATION/TRAINING PROGRAM

## 2025-04-07 PROCEDURE — 25810000003 SODIUM CHLORIDE 0.9 % SOLUTION: Performed by: STUDENT IN AN ORGANIZED HEALTH CARE EDUCATION/TRAINING PROGRAM

## 2025-04-07 PROCEDURE — 25510000001 IOPAMIDOL 61 % SOLUTION: Performed by: STUDENT IN AN ORGANIZED HEALTH CARE EDUCATION/TRAINING PROGRAM

## 2025-04-07 PROCEDURE — 93005 ELECTROCARDIOGRAM TRACING: CPT | Performed by: STUDENT IN AN ORGANIZED HEALTH CARE EDUCATION/TRAINING PROGRAM

## 2025-04-07 PROCEDURE — 83605 ASSAY OF LACTIC ACID: CPT | Performed by: STUDENT IN AN ORGANIZED HEALTH CARE EDUCATION/TRAINING PROGRAM

## 2025-04-07 PROCEDURE — B548ZZA ULTRASONOGRAPHY OF SUPERIOR VENA CAVA, GUIDANCE: ICD-10-PCS | Performed by: STUDENT IN AN ORGANIZED HEALTH CARE EDUCATION/TRAINING PROGRAM

## 2025-04-07 PROCEDURE — 71275 CT ANGIOGRAPHY CHEST: CPT

## 2025-04-07 PROCEDURE — 80053 COMPREHEN METABOLIC PANEL: CPT | Performed by: STUDENT IN AN ORGANIZED HEALTH CARE EDUCATION/TRAINING PROGRAM

## 2025-04-07 PROCEDURE — 83735 ASSAY OF MAGNESIUM: CPT | Performed by: STUDENT IN AN ORGANIZED HEALTH CARE EDUCATION/TRAINING PROGRAM

## 2025-04-07 PROCEDURE — 25010000002 AMIODARONE IN DEXTROSE 5% 360-4.14 MG/200ML-% SOLUTION: Performed by: STUDENT IN AN ORGANIZED HEALTH CARE EDUCATION/TRAINING PROGRAM

## 2025-04-07 PROCEDURE — C1751 CATH, INF, PER/CENT/MIDLINE: HCPCS

## 2025-04-07 PROCEDURE — 25010000002 CEFEPIME PER 500 MG: Performed by: STUDENT IN AN ORGANIZED HEALTH CARE EDUCATION/TRAINING PROGRAM

## 2025-04-07 PROCEDURE — 25010000002 AMIODARONE IN DEXTROSE 5% 150-4.21 MG/100ML-% SOLUTION: Performed by: STUDENT IN AN ORGANIZED HEALTH CARE EDUCATION/TRAINING PROGRAM

## 2025-04-07 PROCEDURE — 84145 PROCALCITONIN (PCT): CPT | Performed by: STUDENT IN AN ORGANIZED HEALTH CARE EDUCATION/TRAINING PROGRAM

## 2025-04-07 PROCEDURE — 5A2204Z RESTORATION OF CARDIAC RHYTHM, SINGLE: ICD-10-PCS | Performed by: STUDENT IN AN ORGANIZED HEALTH CARE EDUCATION/TRAINING PROGRAM

## 2025-04-07 PROCEDURE — 84484 ASSAY OF TROPONIN QUANT: CPT | Performed by: STUDENT IN AN ORGANIZED HEALTH CARE EDUCATION/TRAINING PROGRAM

## 2025-04-07 PROCEDURE — 02HV33Z INSERTION OF INFUSION DEVICE INTO SUPERIOR VENA CAVA, PERCUTANEOUS APPROACH: ICD-10-PCS | Performed by: STUDENT IN AN ORGANIZED HEALTH CARE EDUCATION/TRAINING PROGRAM

## 2025-04-07 PROCEDURE — 84100 ASSAY OF PHOSPHORUS: CPT | Performed by: STUDENT IN AN ORGANIZED HEALTH CARE EDUCATION/TRAINING PROGRAM

## 2025-04-07 PROCEDURE — 87040 BLOOD CULTURE FOR BACTERIA: CPT | Performed by: STUDENT IN AN ORGANIZED HEALTH CARE EDUCATION/TRAINING PROGRAM

## 2025-04-07 PROCEDURE — 83880 ASSAY OF NATRIURETIC PEPTIDE: CPT | Performed by: STUDENT IN AN ORGANIZED HEALTH CARE EDUCATION/TRAINING PROGRAM

## 2025-04-07 PROCEDURE — 36415 COLL VENOUS BLD VENIPUNCTURE: CPT

## 2025-04-07 PROCEDURE — 99291 CRITICAL CARE FIRST HOUR: CPT

## 2025-04-07 PROCEDURE — 71045 X-RAY EXAM CHEST 1 VIEW: CPT

## 2025-04-07 PROCEDURE — 99223 1ST HOSP IP/OBS HIGH 75: CPT | Performed by: STUDENT IN AN ORGANIZED HEALTH CARE EDUCATION/TRAINING PROGRAM

## 2025-04-07 PROCEDURE — 74177 CT ABD & PELVIS W/CONTRAST: CPT

## 2025-04-07 PROCEDURE — 92960 CARDIOVERSION ELECTRIC EXT: CPT

## 2025-04-07 PROCEDURE — 0202U NFCT DS 22 TRGT SARS-COV-2: CPT | Performed by: STUDENT IN AN ORGANIZED HEALTH CARE EDUCATION/TRAINING PROGRAM

## 2025-04-07 PROCEDURE — 25010000002 VANCOMYCIN 5 G RECONSTITUTED SOLUTION: Performed by: STUDENT IN AN ORGANIZED HEALTH CARE EDUCATION/TRAINING PROGRAM

## 2025-04-07 RX ORDER — IOPAMIDOL 612 MG/ML
100 INJECTION, SOLUTION INTRAVASCULAR
Status: COMPLETED | OUTPATIENT
Start: 2025-04-07 | End: 2025-04-07

## 2025-04-07 RX ORDER — ETOMIDATE 2 MG/ML
0.1 INJECTION INTRAVENOUS ONCE
Status: COMPLETED | OUTPATIENT
Start: 2025-04-07 | End: 2025-04-07

## 2025-04-07 RX ORDER — DEXTROSE MONOHYDRATE 25 G/50ML
25 INJECTION, SOLUTION INTRAVENOUS
Status: DISCONTINUED | OUTPATIENT
Start: 2025-04-07 | End: 2025-04-23 | Stop reason: HOSPADM

## 2025-04-07 RX ORDER — LIDOCAINE 50 MG/G
OINTMENT TOPICAL ONCE
Status: COMPLETED | OUTPATIENT
Start: 2025-04-07 | End: 2025-04-07

## 2025-04-07 RX ORDER — INSULIN LISPRO 100 [IU]/ML
2-9 INJECTION, SOLUTION INTRAVENOUS; SUBCUTANEOUS
Status: DISCONTINUED | OUTPATIENT
Start: 2025-04-08 | End: 2025-04-23 | Stop reason: HOSPADM

## 2025-04-07 RX ORDER — NICOTINE POLACRILEX 4 MG
15 LOZENGE BUCCAL
Status: DISCONTINUED | OUTPATIENT
Start: 2025-04-07 | End: 2025-04-23 | Stop reason: HOSPADM

## 2025-04-07 RX ORDER — NOREPINEPHRINE BITARTRATE/D5W 8 MG/250ML
.02-.3 PLASTIC BAG, INJECTION (ML) INTRAVENOUS
Status: DISPENSED | OUTPATIENT
Start: 2025-04-07 | End: 2025-04-08

## 2025-04-07 RX ADMIN — SODIUM CHLORIDE 1000 ML: 9 INJECTION, SOLUTION INTRAVENOUS at 19:02

## 2025-04-07 RX ADMIN — NOREPINEPHRINE BITARTRATE 0.02 MCG/KG/MIN: 8 INJECTION, SOLUTION INTRAVENOUS at 19:53

## 2025-04-07 RX ADMIN — LIDOCAINE: 50 OINTMENT TOPICAL at 18:15

## 2025-04-07 RX ADMIN — AMIODARONE HYDROCHLORIDE 150 MG: 1.5 INJECTION, SOLUTION INTRAVENOUS at 18:10

## 2025-04-07 RX ADMIN — IOPAMIDOL 100 ML: 612 INJECTION, SOLUTION INTRAVENOUS at 21:07

## 2025-04-07 RX ADMIN — SODIUM CHLORIDE 500 ML: 9 INJECTION, SOLUTION INTRAVENOUS at 18:27

## 2025-04-07 RX ADMIN — VANCOMYCIN HYDROCHLORIDE 1750 MG: 5 INJECTION, POWDER, LYOPHILIZED, FOR SOLUTION INTRAVENOUS at 19:23

## 2025-04-07 RX ADMIN — NOREPINEPHRINE BITARTRATE 0.04 MCG/KG/MIN: 8 INJECTION, SOLUTION INTRAVENOUS at 20:06

## 2025-04-07 RX ADMIN — CEFEPIME 2000 MG: 2 INJECTION, POWDER, FOR SOLUTION INTRAVENOUS at 19:23

## 2025-04-07 RX ADMIN — ETOMIDATE 9 MG: 20 INJECTION, SOLUTION INTRAVENOUS at 18:35

## 2025-04-07 RX ADMIN — AMIODARONE HYDROCHLORIDE 1 MG/MIN: 1.8 INJECTION, SOLUTION INTRAVENOUS at 18:24

## 2025-04-07 RX ADMIN — SODIUM CHLORIDE 500 ML: 9 INJECTION, SOLUTION INTRAVENOUS at 18:45

## 2025-04-07 NOTE — TELEPHONE ENCOUNTER
Andra with [facility] calling in regards to patients HR being elevated again. They got urine on him yesterday and results are in chart. She will be with the patient for a little bit otherwise we will most likely not be able to get a hold of him. She advised he needed to go to the ER or come see you and he is refusing. She wants to know about the results of urine and if you want to do anything in regards to that and advise what he needs to do.    Please reach out to her at [phone]

## 2025-04-07 NOTE — TELEPHONE ENCOUNTER
Left message about urine culture not showing significant growth.  Does not need antibiotic therapy

## 2025-04-07 NOTE — ED PROVIDER NOTES
[facility]  Emergency Department Encounter  Emergency Medicine Physician Note       Pt Name: Blane Dietz  MRN: [id number]  Pt :   1935  Room Number:  SUSI/SUSI  Date of encounter:  2025  PCP: David Em MD  ED Physician: Shelton Hartley DO    HPI:  Blane Dietz is a 89 y.o. male who presents to the ED for medical evaluation.  Patient reports shortness of breath, cough, fatigue, diarrhea for 3 weeks.  Duration of symptoms constant.  No modifying factors.  Past medical history of CHF, chronic respiratory failure on baseline 2 L nasal cannula, aortic stenosis status post valve repair, A-fib on Eliquis.  Family reports the patient is been off his medications for several weeks.  Patient denies headache, altered mental status, chest pain, abdominal pain, problems with urination, bloody stools. Reports chronic leg wound and swelling. Patient presents via EMS. Noted to be tachycardic and hypotensive prior to arrival.    HPI limited secondary to acuity of condition.    PAST MEDICAL HISTORY  Past Medical History:   Diagnosis Date    Aortic stenosis     status post ROSS procedure, remote, with 2015 echocardiography revealing normal aortic and pulmonary valve function, normal ejection fraction and mild to moderate MR    Arthritis     Bilateral impacted cerumen 2016    Bronchitis     CHF (congestive heart failure)     Chronic gout of right foot 2016    Impression: 2016 - stable.;     COVID-19 vaccine series completed 2021    [place] 2nd dose 3/12/21.    Depression     Diabetes mellitus     Diverticulitis     Exogenous obesity     Gout     Heart murmur     History of vitamin D deficiency     Hypercholesteremia 2016    Hyperlipidemia     Hypertension     Impaired mobility     Kidney lesion     Malignant neoplasm of prostate     Morbid obesity 2016    Pneumonia 2021    Primary osteoarthritis involving multiple joints 2016    Impression:  "03/08/2016 - stable;     Pulmonary embolism     Sleep apnea 05/12/2021    C-Pap    Uncontrolled type 2 diabetes mellitus with hyperglycemia 06/13/2016    Impression: 03/08/2016 - seeing Endo .;     Vertigo      Current Outpatient Medications   Medication Instructions    allopurinol (ZYLOPRIM) 100 mg, Oral, Daily, Hold for now.  Can restart Sunday.    amiodarone (PACERONE) 200 mg, Daily    Apoaequorin (PREVAGEN PO) Take  by mouth.    ascorbic acid (VITAMIN C) 1,000 mg, Daily    Calcium Carb-Cholecalciferol 600-5 MG-MCG tablet 1 tablet, Oral, Every Other Day    CINNAMON PO 1 tablet, Daily    Droplet Insulin Syringe 31G X 5/16\" 0.5 ML misc     empagliflozin (JARDIANCE) 25 mg, Oral, Daily, Hold for now.  Can restart Sunday.    enzalutamide (XTANDI) 40 MG tablet tablet Take 3 tablets by mouth Daily.    fluticasone (Flonase) 50 MCG/ACT nasal spray 2 sprays, Nasal, Daily PRN    furosemide (LASIX) 40 mg, Oral, Daily, Hold for now.  Can restart Sunday.    glucose blood (True Metrix Blood Glucose Test) test strip Use to test blood sugar 3 times daily.  Dx E11.65    Lantus 100 UNIT/ML injection Inject up to 50 units daily subcutaneously    metoprolol succinate XL (TOPROL-XL) 25 mg, Oral, Daily    pravastatin (PRAVACHOL) 40 MG tablet TAKE ONE TABLET BY MOUTH EVERY NIGHT AT BEDTIME    sacubitril-valsartan (Entresto) 24-26 MG tablet 1 tablet, Oral, Every 12 Hours, Hold for now- kidneys unable to tolerate    VITAMIN D, CHOLECALCIFEROL, PO 1,000 Units, Daily      PAST SURGICAL HISTORY  Past Surgical History:   Procedure Laterality Date    CARDIAC VALVE REPLACEMENT  05/12/2021    Ross procedure 22 years ago    COLON SURGERY      COLONOSCOPY      COLOSTOMY  1999    COLOSTOMY REVISION      CYSTOSCOPY N/A 12/7/2021    Procedure: CYSTOSCOPY FLEXIBLE;  Surgeon: José Miguel Villafuerte Jr., MD;  Location: [facility];  Service: Urology;  Laterality: N/A;    EXPLORATORY LAPAROTOMY      With Tissue Removal    LIPOMA EXCISION      MOHS SURGERY   "    NEPHROURETERECTOMY Right 12/7/2021    Procedure: RIGHT NEPHROURETERECTOMY LAPAROSCOPIC WITH DAVINCI ROBOT;  Surgeon: José Miguel Villafuerte Jr., MD;  Location: [facility];  Service: Robotics - DaVinci;  Laterality: Right;    OTHER SURGICAL HISTORY      Porcine Valve    PROSTATE SURGERY      PROSTATECTOMY  2000     History of Prostatectomy Perineal Radical    SKIN CANCER EXCISION      from head and lip    TONSILLECTOMY         FAMILY HISTORY  Family History   Problem Relation Age of Onset    Diabetes Mother     Lung cancer Mother     Cancer Mother     Heart disease Father     Breast cancer Other     Cancer Other     Hypertension Other     Migraines Other     Obesity Other     Diabetes Other        SOCIAL HISTORY  Social History     Socioeconomic History    Marital status: Single   Tobacco Use    Smoking status: Never     Passive exposure: Never    Smokeless tobacco: Never   Vaping Use    Vaping status: Never Used   Substance and Sexual Activity    Alcohol use: No    Drug use: No    Sexual activity: Not Currently     ALLERGIES  Ampicillin, Medrol [methylprednisolone], Myrbetriq [mirabegron], Penicillins, Bee venom, and Melatonin    REVIEW OF SYSTEMS  All systems reviewed and negative except for those discussed in HPI.     PHYSICAL EXAM  ED Triage Vitals [04/07/25 1732]   Temp Heart Rate Resp BP SpO2   96.5 °F (35.8 °C) (!) 160 16 (!) 82/59 97 %      Temp src Heart Rate Source Patient Position BP Location FiO2 (%)   Axillary Monitor Sitting Left arm --     I have reviewed the triage vital signs and nursing notes.    General: Alert.  Appears chronically ill, but nontoxic.  Mild distress secondary to symptoms.  Head: Normocephalic.  Atraumatic.  Eyes: No scleral icterus.  ENT: Dry mucous membranes.  Cardiovascular: Tachycardic.  Irregular rhythm. No murmurs.  No rubs.  2+ distal pulses bilaterally.  Respiratory: No wheezing. No rales.  No rhonchi.  Decreased breath sounds right lung base  GI: Abdomen is soft.   Nondistended.  Nontender to palpation.  No rebound.  No guarding.  No CVA tenderness.  MSK: Moves all 4 extremities.  Neurologic: Oriented x 3.  No focal deficits.  Skin: 2+ pitting edema to the distal shins.  Chronic skin changes and stasis dermatitis noted bilateral extremities. No pallor. No cyanosis.    LAB RESULTS  Recent Results (from the past 24 hours)   High Sensitivity Troponin T    Collection Time: 04/07/25  5:53 PM    Specimen: Blood   Result Value Ref Range    HS Troponin T 101 (C) <22 ng/L   Comprehensive Metabolic Panel    Collection Time: 04/07/25  5:53 PM    Specimen: Blood   Result Value Ref Range    Glucose 146 (H) 65 - 99 mg/dL    BUN 95 (H) 8 - 23 mg/dL    Creatinine 2.83 (H) 0.76 - 1.27 mg/dL    Sodium 131 (L) 136 - 145 mmol/L    Potassium 5.0 3.5 - 5.2 mmol/L    Chloride 94 (L) 98 - 107 mmol/L    CO2 21.5 (L) 22.0 - 29.0 mmol/L    Calcium 8.0 (L) 8.6 - 10.5 mg/dL    Total Protein 6.7 6.0 - 8.5 g/dL    Albumin 4.0 3.5 - 5.2 g/dL    ALT (SGPT) 77 (H) 1 - 41 U/L    AST (SGOT) 88 (H) 1 - 40 U/L    Alkaline Phosphatase 184 (H) 39 - 117 U/L    Total Bilirubin 1.3 (H) 0.0 - 1.2 mg/dL    Globulin 2.7 gm/dL    A/G Ratio 1.5 g/dL    BUN/Creatinine Ratio 33.6 (H) 7.0 - 25.0    Anion Gap 15.5 (H) 5.0 - 15.0 mmol/L    eGFR 20.9 (L) >60.0 mL/min/1.73   BNP    Collection Time: 04/07/25  5:53 PM    Specimen: Blood   Result Value Ref Range    proBNP 18,393.0 (H) 0.0 - 1,800.0 pg/mL   Green Top (Gel)    Collection Time: 04/07/25  5:53 PM   Result Value Ref Range    Extra Tube Hold for add-ons.    Lavender Top    Collection Time: 04/07/25  5:53 PM   Result Value Ref Range    Extra Tube hold for add-on    Gold Top - SST    Collection Time: 04/07/25  5:53 PM   Result Value Ref Range    Extra Tube Hold for add-ons.    Light Blue Top    Collection Time: 04/07/25  5:53 PM   Result Value Ref Range    Extra Tube Hold for add-ons.    CBC Auto Differential    Collection Time: 04/07/25  5:53 PM    Specimen: Blood   Result  Value Ref Range    WBC 6.87 3.40 - 10.80 10*3/mm3    RBC 4.18 4.14 - 5.80 10*6/mm3    Hemoglobin 12.2 (L) 13.0 - 17.7 g/dL    Hematocrit 37.1 (L) 37.5 - 51.0 %    MCV 88.8 79.0 - 97.0 fL    MCH 29.2 26.6 - 33.0 pg    MCHC 32.9 31.5 - 35.7 g/dL    RDW 15.6 (H) 12.3 - 15.4 %    RDW-SD 49.1 37.0 - 54.0 fl    MPV 10.9 6.0 - 12.0 fL    Platelets 137 (L) 140 - 450 10*3/mm3    Neutrophil % 71.3 42.7 - 76.0 %    Lymphocyte % 13.4 (L) 19.6 - 45.3 %    Monocyte % 12.8 (H) 5.0 - 12.0 %    Eosinophil % 0.6 0.3 - 6.2 %    Basophil % 0.4 0.0 - 1.5 %    Immature Grans % 1.5 (H) 0.0 - 0.5 %    Neutrophils, Absolute 4.90 1.70 - 7.00 10*3/mm3    Lymphocytes, Absolute 0.92 0.70 - 3.10 10*3/mm3    Monocytes, Absolute 0.88 0.10 - 0.90 10*3/mm3    Eosinophils, Absolute 0.04 0.00 - 0.40 10*3/mm3    Basophils, Absolute 0.03 0.00 - 0.20 10*3/mm3    Immature Grans, Absolute 0.10 (H) 0.00 - 0.05 10*3/mm3    nRBC 0.0 0.0 - 0.2 /100 WBC   Magnesium    Collection Time: 04/07/25  5:53 PM    Specimen: Blood   Result Value Ref Range    Magnesium 2.8 (H) 1.6 - 2.4 mg/dL   Phosphorus    Collection Time: 04/07/25  5:53 PM    Specimen: Blood   Result Value Ref Range    Phosphorus 6.4 (H) 2.5 - 4.5 mg/dL   Lactic Acid, Plasma    Collection Time: 04/07/25  5:53 PM    Specimen: Blood   Result Value Ref Range    Lactate 2.2 (C) 0.5 - 2.0 mmol/L   Procalcitonin    Collection Time: 04/07/25  5:53 PM    Specimen: Blood   Result Value Ref Range    Procalcitonin 0.25 0.00 - 0.25 ng/mL   High Sensitivity Troponin T 1Hr    Collection Time: 04/07/25  7:10 PM    Specimen: Blood   Result Value Ref Range    HS Troponin T 41 (H) <22 ng/L    Troponin T Numeric Delta -60 ng/L    Troponin T % Delta -59 Abnormal if >/= 20%   Respiratory Panel PCR w/COVID-19(SARS-CoV-2) [facility]ARE[facility] In-House, NP Swab in [facility], 2 HR TAT - Swab, Nasopharynx    Collection Time: 04/07/25  7:56 PM    Specimen: Nasopharynx; Swab   Result Value Ref Range    ADENOVIRUS, PCR Not  Detected Not Detected    Coronavirus 229E Not Detected Not Detected    Coronavirus HKU1 Not Detected Not Detected    Coronavirus NL63 Not Detected Not Detected    Coronavirus OC43 Not Detected Not Detected    COVID19 Detected (C) Not Detected - Ref. Range    Human Metapneumovirus Not Detected Not Detected    Human Rhinovirus/Enterovirus Not Detected Not Detected    Influenza A PCR Not Detected Not Detected    Influenza B PCR Not Detected Not Detected    Parainfluenza Virus 1 Not Detected Not Detected    Parainfluenza Virus 2 Not Detected Not Detected    Parainfluenza Virus 3 Not Detected Not Detected    Parainfluenza Virus 4 Not Detected Not Detected    RSV, PCR Not Detected Not Detected    Bordetella pertussis pcr Not Detected Not Detected    Bordetella parapertussis PCR Not Detected Not Detected    Chlamydophila pneumoniae PCR Not Detected Not Detected    Mycoplasma pneumo by PCR Not Detected Not Detected   STAT Lactic Acid, Reflex    Collection Time: 04/07/25  9:57 PM    Specimen: Hand, Right; Blood   Result Value Ref Range    Lactate 2.7 (C) 0.5 - 2.0 mmol/L       RADIOLOGY  CT Abdomen Pelvis With Contrast  Result Date: 4/7/2025  FINAL REPORT TECHNIQUE: null CLINICAL HISTORY: Hypotension, shock COMPARISON: null FINDINGS: CT abdomen and pelvis with contrast Comparison: CT - CT ANGIOGRAM CHEST PULMONARY EMBOLISM - 4/7/25 20:55 EDT Findings: Large right and small left pleural effusions with associated atelectasis. Cardiomegaly. Relatively small lobulated liver. No bile duct dilatation. Main portal vein 1.7 cm diameter with recanalized umbilical vein collateral. 2 mm nonobstructing left inferior renal stone. Subcentimeter left mid renal cyst. Right nephrectomy. Edematous gallbladder. No bile duct dilatation. Abdominal solid organs otherwise unremarkable. There is wall thickening of the ascending colon and distal rectum with no inflammatory change. No bowel obstruction, pneumoperitoneum, or pneumatosis. Minimal upper  abdominal ascites. Pelvic contents unremarkable. Normal appendix. Prostatectomy. Urinary bladder is partially decompressed and mildly thick-walled as a result. Small right superior urinary bladder diverticulum. Grade 1 chronic dyspneic L5-S1 spondylolisthesis. Moderate to severe lumbar degenerative spondylosis. No acute fracture. Generalized edema, especially in the subcutaneous tissues.     IMPRESSION: 1. 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 2. Edematous gallbladder is likely secondary to 3rd spacing of fluid rather than cholecystitis. 3. Thick-walled ascending colon and distal rectum are indeterminate. Correlate with any signs or symptoms of colitis. 4. Additional findings as above. Authenticated and Electronically Signed by Ventura Obando MD on 04/07/2025 09:55:20 PM    CT Angiogram Chest Pulmonary Embolism  Result Date: 4/7/2025  FINAL REPORT TECHNIQUE: null CLINICAL HISTORY: Hypotension, shock COMPARISON: null FINDINGS: CT angiography chest with contrast. 3D Postprocessing. Comparison: CT - CT ABDOMEN PELVIS W CONTRAST - 4/7/25 20:55 EDT CT/SR - CT ANGIOGRAM CHEST PULMONARY EMBOLISM - 12/21/24 19:34 EST Findings: Cardiomegaly. No pericardial effusion. RV/LV ratio normal. Ascending aorta 4.6 cm diameter. Lumen is not opacified. Similar to prior. Enlarged pulmonary artery. This can be seen with pulmonary artery hypertension. The visualized thyroid and mediastinum are unremarkable. Lower lobe pulmonary arteries are not opacified. Likely due to poor cardiac output. Large right and small left pleural effusions. Dependent atelectasis both lungs, more prominent on the right. No pneumothorax. Abdominal findings are discussed on the same day comparison. No acute fractures.     IMPRESSION: 1. Bilateral pleural effusions. Given the abnormal findings, this could be secondary to decompensated cirrhotic portal hypertension. 2. No acute pulmonary embolus within the limitations of the exam. 3.  "4.6 cm diameter dilated ascending aorta. Recommend follow-up as needed. 4. Additional findings as above. Authenticated and Electronically Signed by Ventura Obando MD on 04/07/2025 09:54:16 PM    XR Chest 1 View  Result Date: 4/7/2025  FINAL REPORT TECHNIQUE: null CLINICAL HISTORY: SOA COMPARISON: null FINDINGS: Single view of the chest. COMPARISON: None FINDINGS: Status post sternotomy. Cardiomegaly. Atherosclerotic thoracic aorta. Mild prominence of the central pulmonary vasculature. Blunting of the right costophrenic angle. No pneumothorax. No consolidation. No fracture identified. Mild spondylosis.     IMPRESSION: 1. Small right pleural effusion. 2. Cardiomegaly with likely mild pulmonary vascular congestion. Authenticated and Electronically Signed by Jessee Scott MD on 04/07/2025 07:09:41 PM      PROCEDURES  Electrical Cardioversion    Date/Time: 4/7/2025 6:37 PM    Performed by: Shelton Hartley DO  Authorized by: Shelton Hartley DO    Comments:      SYNCHRONIZED CARDIOVERSION  Consent: Verbal.  Risks and benefits: Risk, benefits, and alternatives were discussed.  Risks include: Pain, cutaneous burns, failure to cardiovert, cardiac arrhythmia, venous thromboembolism, stroke.  Consent given by: Patient  Patient states understanding of the procedure being performed.  Patient's understanding of the procedure matches the consent given.  Patient identity confirmed: Armband  Timeout: Immediately prior to procedure a \"timeout\" was called to verify the correct patient, procedure, equipment, and support staff as required.    Indication: Supraventricular tachycardia  Sedatives: Etomidate 0.1 mg/kg IV  Vitals: Vital signs and end-tidal waveform capnography were monitored during sedation.    Preprocedure rhythm: A-fib with RVR, rate 160's  Patient position: Patient was sitting upright on stretcher.  Chest area: Chest area exposed.  Electrodes: Pads  Electrodes position: Anterior-posterior    Number of attempts: " "1  Attempt #1 Mode: Synchronous  Attempt #1 Shock (in Joules): 200     Post procedure rhythm: Normal sinus rhythm, rate 60's    Complications: None    Patient tolerated the procedure well with no immediate complications.  Central Line At Bedside    Date/Time: 4/7/2025 11:00 PM    Performed by: Shelton Hartley DO  Authorized by: Shelton Hartley DO    Comments:      CENTRAL VENOUS LINE PLACEMENT  Consent: Written consent.  Risk and benefits: Risk, benefits, and alternatives were discussed.  Risk include: Bleeding, infection, hematoma, misplaced catheter, failed attempt.   Consent given by: Patient  Patient states understanding of the procedure being performed.  Patient's understanding of the procedure matches the consent given.  Patient identity confirmed: Armband  Timeout: Immediately prior to procedure a \"timeout\" was called to verify the correct patient, procedure, equipment, and support staff as required.    Indication: Hemodynamic instability requiring vasopressor support  Anesthesia: Local infiltration  Local anesthetic: Lidocaine 1% without epinephrine  Anesthetic total: 4 mL    Hand hygiene: Performed prior to catheter insertion.  Preparation: Skin prepped with 2% chlorhexidine.  Skin prep agent completely dried prior to procedure.  Sterile barriers: All 5 maximum sterile barrier is used -cap, mask, sterile gown, sterile gloves, and a large sterile sheet.    Location: Right femoral vein  Patient position: Supine  Catheter type: Triple-lumen  Catheter size (Fr): 7  Preprocedure landmarks were identified.  Ultrasound guidance: Yes    Unable to get guidewire to successfully pass into the vein. This was attempted x 2, but guidewire met resistance. At this point, decision was made to abort procedure and switch to the left leg. Patient did note to have mild venous bleeding from needle puncture site, therefore I placed a figure-8 stitch, which resulted in appropriate hemostasis.    Dressing applied by nursing " "staff.    Patient tolerated the procedure well with no immediate complications.      Central Line At Bedside    Date/Time: 4/7/2025 11:30 PM    Performed by: Shelton Hartley DO  Authorized by: Shelton Hartley DO    Comments:      CENTRAL VENOUS LINE PLACEMENT  Consent: Written  Risk and benefits: Risk, benefits, and alternatives were discussed.  Risk include: Bleeding, infection, hematoma, misplaced catheter, failed attempt.   Consent given by: Patient  Patient states understanding of the procedure being performed.  Patient's understanding of the procedure matches the consent given.  Patient identity confirmed: Armband  Timeout: Immediately prior to procedure a \"timeout\" was called to verify the correct patient, procedure, equipment, and support staff as required.    Indication: Hemodynamic instability requiring vasopressor support  Anesthesia: Local infiltration  Local anesthetic: Lidocaine 1% without epinephrine  Anesthetic total: 4 mL    Hand hygiene: Performed prior to catheter insertion.  Preparation: Skin prepped with 2% chlorhexidine.  Skin prep agent completely dried prior to procedure.  Sterile barriers: All 5 maximum sterile barrier is used -cap, mask, sterile gown, sterile gloves, and a large sterile sheet.    Location: Left femoral vein  Patient position: Supine  Catheter type: Triple-lumen  Catheter size (Fr): 7  Preprocedure landmarks were identified.  Ultrasound guidance: Yes  Guidewire placement confirmed in vein with ultrasound prior to dilation.  Number of attempts: 1  Successful placement: Yes  Assessment: Blood return through all ports, free fluid flow.    Post procedure: Line sutured and dressing applied by nursing staff.    Patient tolerated the procedure well with no immediate complications.    Critical Care    Performed by: Shelton Hartley DO  Authorized by: Shelton Hartley DO    Comments:      CRITICAL CARE PROCEDURE NOTE  Authorized and performed by: Dr. Hartley    Total critical care " time: Approximately 120 minutes.    Patient was critically ill due to: A-fib with RVR, hypotension with shock, COVID    Interventions: IV fluid resuscitation, IV antibiotics, synchronized cardioversion, IV vasopressor support, close airway/mental status/hemodynamic monitoring.    Due to a high probability of clinically significant, life threatening deterioration, the patient required my highest level of preparedness to intervene emergently and I personally spent this critical care time directly and personally managing the patient.  This critical care time included obtaining a history; examining the patient; pulse oximetry; ordering and review of studies; arranging urgent treatment with development of a management plan; evaluation of patient's response to treatment; frequent reassessment; and, discussions with other providers.    This critical care time was performed to assess and manage the high probability of imminent, life-threatening deterioration that could result in multiorgan failure.  It was exclusive of separately billable procedures, treating other patients and teaching time.    Please see MDM section and the rest of the note for further information on patient assessment and interventions.      RISK STRATIFICATION    MEDICAL DECISION MAKING  89 y.o. male with past medical history listed above who presents with shortness of breath, cough, fatigue, diarrhea. Noted to be hypotensive     Patient arrives via EMS.  I have reviewed the EMS documentation/notes and included that information in my HPI.    Due to patient's emergent condition, they were taken to the medical resuscitation bay #12.  Patient was placed on cardiac telemetry and pulse oximetry. Vital signs remarkable for heart rate 160, blood pressure 82/59, otherwise within normal limits.  Pulse ox 97% on room air.  Peripheral IV access was established by nursing staff.    Based on clinical presentation and physical exam, differential diagnosis includes,  but is not limited to, A-fib, SVT, V. tach, cardiac rhythm, acute coronary syndrome, CHF, pulmonary edema, pulmonary embolism, sepsis, dehydration.    At least 3 different tests have been ordered on this patient.    Medications administered in ED:  Medications   amiodarone 150 mg in 100 mL D5W (loading dose) (0 mg Intravenous Stopped 4/7/25 1821)     Followed by   amiodarone 360 mg in 200 mL D5W infusion (0 mg/min Intravenous Stopped 4/8/25 0004)     Followed by   amiodarone 360 mg in 200 mL D5W infusion (0.5 mg/min Intravenous New Bag 4/8/25 0003)   norepinephrine (LEVOPHED) 8 mg in 250mL D5W infusion (0.04 mcg/kg/min × 90.7 kg Intravenous Currently Infusing 4/7/25 2215)   dextrose (GLUTOSE) oral gel 15 g (has no administration in time range)   dextrose (D50W) (25 g/50 mL) IV injection 25 g (has no administration in time range)   glucagon (GLUCAGEN) injection 1 mg (has no administration in time range)   Insulin Lispro (humaLOG) injection 2-9 Units (has no administration in time range)   lidocaine (XYLOCAINE) 5 % ointment ( Topical Given 4/7/25 1815)   sodium chloride 0.9 % bolus 500 mL (0 mL Intravenous Stopped 4/7/25 1845)   etomidate (AMIDATE) injection 9 mg (9 mg Intravenous Given 4/7/25 1835)   sodium chloride 0.9 % bolus 500 mL (0 mL Intravenous Stopped 4/7/25 1927)   sodium chloride 0.9 % bolus 1,000 mL (0 mL Intravenous Stopped 4/7/25 1958)   cefepime 2000 mg IVPB in 100 mL NS (VTB) (0 mg Intravenous Stopped 4/7/25 1956)   vancomycin 1750 mg/500 mL 0.9% NS IVPB ([facility]) (0 mg Intravenous Stopped 4/7/25 2137)   iopamidol (ISOVUE-300) 61 % injection 100 mL (100 mL Intravenous Given 4/7/25 2107)     Please see ED course below for my interpretation of the ED workup.  ED Course as of 04/08/25 0010   Mon Apr 07, 2025   1752 ECG 12 Lead Tachycardia  EKG per my interpretation wide-complex tachycardia, rate 161, left axis deviation, right bundle branch block with QRS duration 148 ms, no STEMI.  EKG unchanged from  2/3/2025. [JS]   1753 Given normal mentation I ordered IV fluid bolus and amiodarone bolus/infusion. Patient agreeable with decision to hold off on cardioversion at this time. Has been cardioverted in the past. [JS]   1830 Informed by nursing staff that patient's BP dropped to 60/40. Again heart rate noted to be in the 160s.  Mental status remains the same.  At this point decision was made to emergently cardiovert the patient given continued hemodynamic instability despite treatment with IV fluids and antiarrhythmics. [JS]   1837 Patient underwent successful electrical cardioversion at bedside. Please see above procedure note for complete details. [JS]   1840 Unfortunately, despite successful cardioversion patient remains hypotensive.  I ordered additional IV fluid resuscitation.  Differential was broadened to include pulmonary embolism, septic shock, cardiogenic shock.    I believe patient warrants CT chest abdomen pelvis with IV contrast to rule out underlying life threatening conditions despite known kidney disease. [JS]   1847 ECG 12 Lead Rhythm Change  EKG post cardioversion per my interpretation normal sinus rhythm, rate 60, leftward axis, right bundle branch block with QRS duration 180 ms, no STEMI. [JS]   1959 Patient's daughter/POA, Susana Dietz, arrived. [JS]   Tue Apr 08, 2025 0007 I reviewed the labs listed above.     Notable findings are highlighted below.    Old laboratory data was reviewed from the medical records and compared to today's results.   [JS]   0007 Hemoglobin(!): 12.2 [JS]   0007 Platelets(!): 137 [JS]   0007 Creatinine(!): 2.83  Baseline 2.2. [JS]   0007 ALT (SGPT)(!): 77 [JS]   0007 AST (SGOT)(!): 88 [JS]   0007 Magnesium(!): 2.8 [JS]   0008 HS Troponin T(!!): 101 [JS]   0008 HS Troponin T(!): 41 [JS]   0008 proBNP(!): 18,393.0 [JS]   0008 Phosphorus(!): 6.4 [JS]   0008 Lactate(!!): 2.2 [JS]   0008 Lactate(!!): 2.7 [JS]   0008 COVID19(!!): Detected [JS]   0008 I have independently  reviewed and interpreted the imaging listed above.  My interpretations are listed below:    - CTA chest negative for saddle pulmonary embolism.    - CT abdomen and pelvis negative for small bowel obstruction.    Radiologist notes the following:     CT Abdomen Pelvis With Contrast  Result Date: 4/7/2025  IMPRESSION: 1. 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 2. Edematous gallbladder is likely secondary to 3rd spacing of fluid rather than cholecystitis. 3. Thick-walled ascending colon and distal rectum are indeterminate. Correlate with any signs or symptoms of colitis. 4. Additional findings as above. Authenticated and Electronically Signed by Ventura Obando MD on 04/07/2025 09:55:20 PM    CT Angiogram Chest Pulmonary Embolism  Result Date: 4/7/2025  IMPRESSION: 1. Bilateral pleural effusions. Given the abnormal findings, this could be secondary to decompensated cirrhotic portal hypertension. 2. No acute pulmonary embolus within the limitations of the exam. 3. 4.6 cm diameter dilated ascending aorta. Recommend follow-up as needed. 4. Additional findings as above. Authenticated and Electronically Signed by Ventura Obando MD on 04/07/2025 09:54:16 PM    XR Chest 1 View  Result Date: 4/7/2025  IMPRESSION: 1. Small right pleural effusion. 2. Cardiomegaly with likely mild pulmonary vascular congestion. Authenticated and Electronically Signed by Jessee Scott MD on 04/07/2025 07:09:41 PM     [JS]   0009 Case discussed with Dr. Kerley (hospitalist) who agrees to evaluate and admit the patient.  We discussed the HPI, pertinent PMHx, ED course and workup. [JS]   0009 Sepsis re-evaluation performed. [JS]      ED Course User Index  [JS] Shelton Hartley DO     REPEAT VITAL SIGNS  AS OF 00:10 EDT VITALS:  BP - 123/67  HR - 63  TEMP - 97.2 °F (36.2 °C) (Oral)  O2 SATS - 94%    DIAGNOSIS  Final diagnoses:   COVID-19   Shock   Atrial fibrillation with rapid ventricular response     DISPOSITION  ED  Disposition       ED Disposition   Decision to Admit    Condition   --    Comment   Level of Care: Telemetry [5]   Diagnosis: COVID-19 [[id number]]   Admitting Physician: KERLEY, BRIAN JOSEPH [[id number]]   Attending Physician: KERLEY, BRIAN JOSEPH [[id number]]   Certification: I Certify That Inpatient Hospital Services Are Medically Necessary For Greater Than 2 Midnights               Please note that portions of this document were completed with voice recognition software.        Shelton Hartley DO  04/09/25 1108

## 2025-04-08 LAB
ADV 40+41 DNA STL QL NAA+NON-PROBE: NOT DETECTED
ALBUMIN SERPL-MCNC: 3.7 G/DL (ref 3.5–5.2)
ALBUMIN/GLOB SERPL: 1.5 G/DL
ALP SERPL-CCNC: 163 U/L (ref 39–117)
ALT SERPL W P-5'-P-CCNC: 82 U/L (ref 1–41)
ANION GAP SERPL CALCULATED.3IONS-SCNC: 19.7 MMOL/L (ref 5–15)
AST SERPL-CCNC: 93 U/L (ref 1–40)
ASTRO TYP 1-8 RNA STL QL NAA+NON-PROBE: NOT DETECTED
BACTERIA UR QL AUTO: ABNORMAL /HPF
BASOPHILS # BLD AUTO: 0.04 10*3/MM3 (ref 0–0.2)
BASOPHILS NFR BLD AUTO: 0.5 % (ref 0–1.5)
BILIRUB SERPL-MCNC: 1.2 MG/DL (ref 0–1.2)
BILIRUB UR QL STRIP: NEGATIVE
BUN SERPL-MCNC: 94 MG/DL (ref 8–23)
BUN/CREAT SERPL: 34.3 (ref 7–25)
C CAYETANENSIS DNA STL QL NAA+NON-PROBE: NOT DETECTED
C COLI+JEJ+UPSA DNA STL QL NAA+NON-PROBE: NOT DETECTED
C DIFF GDH + TOXINS A+B STL QL IA.RAPID: NEGATIVE
C DIFF GDH + TOXINS A+B STL QL IA.RAPID: NEGATIVE
CALCIUM SPEC-SCNC: 7.4 MG/DL (ref 8.6–10.5)
CHLORIDE SERPL-SCNC: 97 MMOL/L (ref 98–107)
CLARITY UR: CLEAR
CO2 SERPL-SCNC: 17.3 MMOL/L (ref 22–29)
COLOR UR: YELLOW
CREAT SERPL-MCNC: 2.74 MG/DL (ref 0.76–1.27)
CRYPTOSP DNA STL QL NAA+NON-PROBE: NOT DETECTED
D-LACTATE SERPL-SCNC: 1.7 MMOL/L (ref 0.5–2)
D-LACTATE SERPL-SCNC: 2.3 MMOL/L (ref 0.5–2)
DEPRECATED RDW RBC AUTO: 50.4 FL (ref 37–54)
E HISTOLYT DNA STL QL NAA+NON-PROBE: NOT DETECTED
EAEC PAA PLAS AGGR+AATA ST NAA+NON-PRB: NOT DETECTED
EC STX1+STX2 GENES STL QL NAA+NON-PROBE: NOT DETECTED
EGFRCR SERPLBLD CKD-EPI 2021: 21.7 ML/MIN/1.73
EOSINOPHIL # BLD AUTO: 0 10*3/MM3 (ref 0–0.4)
EOSINOPHIL NFR BLD AUTO: 0 % (ref 0.3–6.2)
EPEC EAE GENE STL QL NAA+NON-PROBE: NOT DETECTED
ERYTHROCYTE [DISTWIDTH] IN BLOOD BY AUTOMATED COUNT: 15.7 % (ref 12.3–15.4)
ETEC LTA+ST1A+ST1B TOX ST NAA+NON-PROBE: NOT DETECTED
G LAMBLIA DNA STL QL NAA+NON-PROBE: NOT DETECTED
GLOBULIN UR ELPH-MCNC: 2.5 GM/DL
GLUCOSE BLDC GLUCOMTR-MCNC: 167 MG/DL (ref 70–130)
GLUCOSE BLDC GLUCOMTR-MCNC: 170 MG/DL (ref 70–130)
GLUCOSE BLDC GLUCOMTR-MCNC: 186 MG/DL (ref 70–130)
GLUCOSE BLDC GLUCOMTR-MCNC: 189 MG/DL (ref 70–130)
GLUCOSE SERPL-MCNC: 183 MG/DL (ref 65–99)
GLUCOSE UR STRIP-MCNC: ABNORMAL MG/DL
HBA1C MFR BLD: 7.8 % (ref 4.8–5.6)
HCT VFR BLD AUTO: 37.3 % (ref 37.5–51)
HGB BLD-MCNC: 12 G/DL (ref 13–17.7)
HGB UR QL STRIP.AUTO: NEGATIVE
HYALINE CASTS UR QL AUTO: ABNORMAL /LPF
IMM GRANULOCYTES # BLD AUTO: 0.15 10*3/MM3 (ref 0–0.05)
IMM GRANULOCYTES NFR BLD AUTO: 1.7 % (ref 0–0.5)
KETONES UR QL STRIP: NEGATIVE
LEUKOCYTE ESTERASE UR QL STRIP.AUTO: ABNORMAL
LYMPHOCYTES # BLD AUTO: 0.9 10*3/MM3 (ref 0.7–3.1)
LYMPHOCYTES NFR BLD AUTO: 10.3 % (ref 19.6–45.3)
MCH RBC QN AUTO: 28.7 PG (ref 26.6–33)
MCHC RBC AUTO-ENTMCNC: 32.2 G/DL (ref 31.5–35.7)
MCV RBC AUTO: 89.2 FL (ref 79–97)
MONOCYTES # BLD AUTO: 1.11 10*3/MM3 (ref 0.1–0.9)
MONOCYTES NFR BLD AUTO: 12.7 % (ref 5–12)
MRSA DNA SPEC QL NAA+PROBE: NORMAL
MUCOUS THREADS URNS QL MICRO: ABNORMAL /HPF
NEUTROPHILS NFR BLD AUTO: 6.51 10*3/MM3 (ref 1.7–7)
NEUTROPHILS NFR BLD AUTO: 74.8 % (ref 42.7–76)
NITRITE UR QL STRIP: NEGATIVE
NOROVIRUS GI+II RNA STL QL NAA+NON-PROBE: NOT DETECTED
NRBC BLD AUTO-RTO: 0 /100 WBC (ref 0–0.2)
P SHIGELLOIDES DNA STL QL NAA+NON-PROBE: NOT DETECTED
PH UR STRIP.AUTO: <=5 [PH] (ref 5–8)
PLATELET # BLD AUTO: 180 10*3/MM3 (ref 140–450)
PMV BLD AUTO: 10.9 FL (ref 6–12)
POTASSIUM SERPL-SCNC: 4.8 MMOL/L (ref 3.5–5.2)
PROT SERPL-MCNC: 6.2 G/DL (ref 6–8.5)
PROT UR QL STRIP: ABNORMAL
RBC # BLD AUTO: 4.18 10*6/MM3 (ref 4.14–5.8)
RBC # UR STRIP: ABNORMAL /HPF
REF LAB TEST METHOD: ABNORMAL
RVA RNA STL QL NAA+NON-PROBE: NOT DETECTED
S ENT+BONG DNA STL QL NAA+NON-PROBE: NOT DETECTED
SAPO I+II+IV+V RNA STL QL NAA+NON-PROBE: NOT DETECTED
SHIGELLA SP+EIEC IPAH ST NAA+NON-PROBE: NOT DETECTED
SODIUM SERPL-SCNC: 134 MMOL/L (ref 136–145)
SP GR UR STRIP: >=1.03 (ref 1–1.03)
SQUAMOUS #/AREA URNS HPF: ABNORMAL /HPF
UROBILINOGEN UR QL STRIP: ABNORMAL
V CHOL+PARA+VUL DNA STL QL NAA+NON-PROBE: NOT DETECTED
V CHOLERAE DNA STL QL NAA+NON-PROBE: NOT DETECTED
VANCOMYCIN SERPL-MCNC: 16.7 MCG/ML (ref 5–40)
WBC # UR STRIP: ABNORMAL /HPF
WBC NRBC COR # BLD AUTO: 8.71 10*3/MM3 (ref 3.4–10.8)
Y ENTEROCOL DNA STL QL NAA+NON-PROBE: NOT DETECTED

## 2025-04-08 PROCEDURE — 82948 REAGENT STRIP/BLOOD GLUCOSE: CPT

## 2025-04-08 PROCEDURE — 87507 IADNA-DNA/RNA PROBE TQ 12-25: CPT | Performed by: FAMILY MEDICINE

## 2025-04-08 PROCEDURE — 25010000002 CEFEPIME PER 500 MG: Performed by: STUDENT IN AN ORGANIZED HEALTH CARE EDUCATION/TRAINING PROGRAM

## 2025-04-08 PROCEDURE — 87324 CLOSTRIDIUM AG IA: CPT | Performed by: FAMILY MEDICINE

## 2025-04-08 PROCEDURE — 82948 REAGENT STRIP/BLOOD GLUCOSE: CPT | Performed by: STUDENT IN AN ORGANIZED HEALTH CARE EDUCATION/TRAINING PROGRAM

## 2025-04-08 PROCEDURE — 83605 ASSAY OF LACTIC ACID: CPT | Performed by: STUDENT IN AN ORGANIZED HEALTH CARE EDUCATION/TRAINING PROGRAM

## 2025-04-08 PROCEDURE — 25010000002 VANCOMYCIN 1 G RECONSTITUTED SOLUTION 1 EACH VIAL: Performed by: STUDENT IN AN ORGANIZED HEALTH CARE EDUCATION/TRAINING PROGRAM

## 2025-04-08 PROCEDURE — 99222 1ST HOSP IP/OBS MODERATE 55: CPT | Performed by: INTERNAL MEDICINE

## 2025-04-08 PROCEDURE — 25010000002 AMIODARONE IN DEXTROSE 5% 360-4.14 MG/200ML-% SOLUTION: Performed by: STUDENT IN AN ORGANIZED HEALTH CARE EDUCATION/TRAINING PROGRAM

## 2025-04-08 PROCEDURE — 63710000001 INSULIN LISPRO (HUMAN) PER 5 UNITS: Performed by: STUDENT IN AN ORGANIZED HEALTH CARE EDUCATION/TRAINING PROGRAM

## 2025-04-08 PROCEDURE — 99233 SBSQ HOSP IP/OBS HIGH 50: CPT | Performed by: FAMILY MEDICINE

## 2025-04-08 PROCEDURE — 80202 ASSAY OF VANCOMYCIN: CPT | Performed by: STUDENT IN AN ORGANIZED HEALTH CARE EDUCATION/TRAINING PROGRAM

## 2025-04-08 PROCEDURE — 80053 COMPREHEN METABOLIC PANEL: CPT | Performed by: STUDENT IN AN ORGANIZED HEALTH CARE EDUCATION/TRAINING PROGRAM

## 2025-04-08 PROCEDURE — 25810000003 SODIUM CHLORIDE 0.9 % SOLUTION 250 ML FLEX CONT: Performed by: STUDENT IN AN ORGANIZED HEALTH CARE EDUCATION/TRAINING PROGRAM

## 2025-04-08 PROCEDURE — 87449 NOS EACH ORGANISM AG IA: CPT | Performed by: FAMILY MEDICINE

## 2025-04-08 PROCEDURE — 85025 COMPLETE CBC W/AUTO DIFF WBC: CPT | Performed by: STUDENT IN AN ORGANIZED HEALTH CARE EDUCATION/TRAINING PROGRAM

## 2025-04-08 PROCEDURE — 81001 URINALYSIS AUTO W/SCOPE: CPT | Performed by: STUDENT IN AN ORGANIZED HEALTH CARE EDUCATION/TRAINING PROGRAM

## 2025-04-08 PROCEDURE — 83036 HEMOGLOBIN GLYCOSYLATED A1C: CPT | Performed by: STUDENT IN AN ORGANIZED HEALTH CARE EDUCATION/TRAINING PROGRAM

## 2025-04-08 PROCEDURE — 87641 MR-STAPH DNA AMP PROBE: CPT | Performed by: STUDENT IN AN ORGANIZED HEALTH CARE EDUCATION/TRAINING PROGRAM

## 2025-04-08 RX ORDER — SODIUM CHLORIDE 0.9 % (FLUSH) 0.9 %
10 SYRINGE (ML) INJECTION AS NEEDED
Status: DISCONTINUED | OUTPATIENT
Start: 2025-04-08 | End: 2025-04-23 | Stop reason: HOSPADM

## 2025-04-08 RX ORDER — PHENYLEPHRINE HCL IN 0.9% NACL 50MG/250ML
.5-3 PLASTIC BAG, INJECTION (ML) INTRAVENOUS
Status: DISCONTINUED | OUTPATIENT
Start: 2025-04-08 | End: 2025-04-13

## 2025-04-08 RX ORDER — METOPROLOL SUCCINATE 25 MG/1
25 TABLET, EXTENDED RELEASE ORAL DAILY
Status: DISCONTINUED | OUTPATIENT
Start: 2025-04-08 | End: 2025-04-23 | Stop reason: HOSPADM

## 2025-04-08 RX ORDER — ONDANSETRON 2 MG/ML
4 INJECTION INTRAMUSCULAR; INTRAVENOUS EVERY 6 HOURS PRN
Status: DISCONTINUED | OUTPATIENT
Start: 2025-04-08 | End: 2025-04-23 | Stop reason: HOSPADM

## 2025-04-08 RX ORDER — SODIUM CHLORIDE 9 MG/ML
40 INJECTION, SOLUTION INTRAVENOUS AS NEEDED
Status: DISCONTINUED | OUTPATIENT
Start: 2025-04-08 | End: 2025-04-23 | Stop reason: HOSPADM

## 2025-04-08 RX ORDER — NITROGLYCERIN 0.4 MG/1
0.4 TABLET SUBLINGUAL
Status: DISCONTINUED | OUTPATIENT
Start: 2025-04-08 | End: 2025-04-23 | Stop reason: HOSPADM

## 2025-04-08 RX ORDER — SODIUM CHLORIDE 0.9 % (FLUSH) 0.9 %
10 SYRINGE (ML) INJECTION EVERY 12 HOURS SCHEDULED
Status: DISCONTINUED | OUTPATIENT
Start: 2025-04-08 | End: 2025-04-23 | Stop reason: HOSPADM

## 2025-04-08 RX ORDER — PRAVASTATIN SODIUM 20 MG
40 TABLET ORAL DAILY
Status: DISCONTINUED | OUTPATIENT
Start: 2025-04-08 | End: 2025-04-23 | Stop reason: HOSPADM

## 2025-04-08 RX ADMIN — PRAVASTATIN SODIUM 40 MG: 20 TABLET ORAL at 10:39

## 2025-04-08 RX ADMIN — MUPIROCIN 1 APPLICATION: 20 OINTMENT TOPICAL at 21:12

## 2025-04-08 RX ADMIN — Medication 10 ML: at 02:26

## 2025-04-08 RX ADMIN — CEFEPIME 2000 MG: 2 INJECTION, POWDER, FOR SOLUTION INTRAVENOUS at 19:51

## 2025-04-08 RX ADMIN — VANCOMYCIN HYDROCHLORIDE 1000 MG: 1 INJECTION, POWDER, LYOPHILIZED, FOR SOLUTION INTRAVENOUS at 10:41

## 2025-04-08 RX ADMIN — MUPIROCIN 1 APPLICATION: 20 OINTMENT TOPICAL at 10:39

## 2025-04-08 RX ADMIN — AMIODARONE HYDROCHLORIDE 0.5 MG/MIN: 1.8 INJECTION, SOLUTION INTRAVENOUS at 00:03

## 2025-04-08 RX ADMIN — AMIODARONE HYDROCHLORIDE 0.5 MG/MIN: 1.8 INJECTION, SOLUTION INTRAVENOUS at 13:33

## 2025-04-08 RX ADMIN — Medication 0.5 MCG/KG/MIN: at 13:33

## 2025-04-08 RX ADMIN — Medication 10 ML: at 10:41

## 2025-04-08 RX ADMIN — METOPROLOL SUCCINATE 25 MG: 25 TABLET, EXTENDED RELEASE ORAL at 10:39

## 2025-04-08 RX ADMIN — Medication 10 ML: at 21:12

## 2025-04-08 RX ADMIN — INSULIN LISPRO 2 UNITS: 100 INJECTION, SOLUTION INTRAVENOUS; SUBCUTANEOUS at 21:11

## 2025-04-08 RX ADMIN — APIXABAN 2.5 MG: 2.5 TABLET, FILM COATED ORAL at 02:24

## 2025-04-08 RX ADMIN — APIXABAN 2.5 MG: 2.5 TABLET, FILM COATED ORAL at 21:12

## 2025-04-08 NOTE — THERAPY EVALUATION
PT evaluation orders received. PT eval held as pt is on continuous levo and amiodarone drip at this time. PT will follow up as medically appropriate.

## 2025-04-08 NOTE — H&P
[facility]      Name:  Blane Dietz   Age:  89 y.o.  Sex:  male  :  1935  MRN:  [id number]   Visit Number:  [id number]  Admission Date:  2025  Date Of Service:  25  Primary Care Physician:  David Em MD    Chief Complaint:     Shortness of air, N/V/D    History Of Presenting Illness:      Blane Dietz is an 89-year-old man with past medical history of heart failure reduced ejection fraction, moderate AR, moderate MR, type 2 diabetes, cirrhosis, hypertension, prostate cancer, aortic stenosis status post AVR, HUSSAIN on CPAP, hyperlipidemia, history of diffuse large B-cell lymphoma of kidney, CKD, atrial fibrillation.  Presented to [facility] ED on 2025 with concern for several weeks of various degrees of diarrhea, shortness of air with cough.  Family reports the patient actually has been off of this medication for several weeks.  Denied any chest pain, abdominal pain.  Says that he does feel somewhat short of air.  Denied any fevers or chills.    ED summary: Afebrile, atrial fibrillation with RVR rate as high as 160 resolved with cardioversion, hypotension resolved with fluids and Levophed, vital signs stable on 2 L baseline.  EKG initially atrial fibrillation with RVR rate 161.  After resolution sinus rhythm with first-degree AV block, right bundle branch block.  Troponin monitoring 1, 41, ACS not suspected.  proBNP over 18,000.  Sodium 131, anion gap 13.5, BUN 95, creatinine 2.83, EGFR 20, blood glucose 246, calcium 8.0, magnesium 2.8, Phos 6.4, , ALT 77, bilirubin 1.3.  Lactate 2.7, procalcitonin 0.25, no leukocytosis, hemoglobin 12.2.  Blood cultures.  COVID-19 positive.  CT angio chest bilateral pleural effusions, no acute PE, 4.6 cm diameter dilated ascending aorta.  CT ab/pelvis findings suggesting decompensated cirrhotic portal hypertension, edematous gallbladder likely secondary to third spacing, thick-walled descending colon and  distal rectum indeterminant.  He was provided IV amiodarone, vancomycin and cefepime, etomidate, Xylocaine, 2 L bolus.  Started on Levophed.  ED physician plans on placing central line.    Review Of Systems:    All systems were reviewed and negative except as mentioned in history of presenting illness, assessment and plan.    Past Medical History: Patient  has a past medical history of Aortic stenosis, Arthritis, Bilateral impacted cerumen (11/23/2016), Bronchitis, CHF (congestive heart failure), Chronic gout of right foot (06/13/2016), COVID-19 vaccine series completed (05/12/2021), Depression, Diabetes mellitus, Diverticulitis, Exogenous obesity, Gout, Heart murmur, History of vitamin D deficiency, Hypercholesteremia (08/11/2016), Hyperlipidemia, Hypertension, Impaired mobility, Kidney lesion, Malignant neoplasm of prostate, Morbid obesity (08/11/2016), Pneumonia (05/12/2021), Primary osteoarthritis involving multiple joints (06/13/2016), Pulmonary embolism, Sleep apnea (05/12/2021), Uncontrolled type 2 diabetes mellitus with hyperglycemia (06/13/2016), and Vertigo.    Past Surgical History: Patient  has a past surgical history that includes Colostomy (1999); Other surgical history; Exploratory laparotomy; Colonoscopy; Revision Colostomy; Prostate surgery; Prostatectomy (2000); Colon surgery; Tonsillectomy; Skin cancer excision; Lipoma Excision; Mohs surgery; Cardiac valve replacement (05/12/2021); nephroureterectomy (Right, 12/7/2021); and Cystoscopy (N/A, 12/7/2021).    Social History: Patient  reports that he has never smoked. He has never been exposed to tobacco smoke. He has never used smokeless tobacco. He reports that he does not drink alcohol and does not use drugs.    Family History:  Patient's family history has been reviewed and found to be noncontributory.     Allergies:      Ampicillin, Medrol [methylprednisolone], Myrbetriq [mirabegron], Penicillins, Bee venom, and Melatonin    Home  "Medications:    Prior to Admission Medications       Prescriptions Last Dose Informant Patient Reported? Taking?    allopurinol (ZYLOPRIM) 100 MG tablet   No No    Take 1 tablet by mouth Daily. Hold for now.  Can restart Sunday.  Indications: Gout    amiodarone (PACERONE) 200 MG tablet   Yes No    Take 1 tablet by mouth Daily.    Apoaequorin (PREVAGEN PO)   Yes No    Take  by mouth.    ascorbic acid (VITAMIN C) 1000 MG tablet   Yes No    Take 1 tablet by mouth Daily.    Calcium Carb-Cholecalciferol 600-5 MG-MCG tablet   No No    Take 1 tablet by mouth Every Other Day. Indications: Low Amount of Calcium in the Blood    CINNAMON PO   Yes No    Take 1 tablet by mouth Daily. Patient unsure of dose    Droplet Insulin Syringe 31G X 5/16\" 0.5 ML misc   Yes No    empagliflozin (Jardiance) 25 MG tablet tablet   No No    Take 1 tablet by mouth Daily. Hold for now.  Can restart Sunday.    enzalutamide (XTANDI) 40 MG tablet tablet   No No    Take 3 tablets by mouth Daily.    fluticasone (Flonase) 50 MCG/ACT nasal spray   No No    Administer 2 sprays into the nostril(s) as directed by provider Daily As Needed for Rhinitis or Allergies.    furosemide (LASIX) 40 MG tablet   No No    Take 1 tablet by mouth Daily. Hold for now.  Can restart Sunday.    glucose blood (True Metrix Blood Glucose Test) test strip   No No    Use to test blood sugar 3 times daily.  Dx E11.65    Lantus 100 UNIT/ML injection   No No    Inject up to 50 units daily subcutaneously    Patient taking differently:  Inject up to 20 units daily subcutaneously    metoprolol succinate XL (TOPROL-XL) 25 MG 24 hr tablet   No No    Take 1 tablet by mouth Daily.    pravastatin (PRAVACHOL) 40 MG tablet   No No    TAKE ONE TABLET BY MOUTH EVERY NIGHT AT BEDTIME    sacubitril-valsartan (Entresto) 24-26 MG tablet   No No    Take 1 tablet by mouth Every 12 (Twelve) Hours. Hold for now- kidneys unable to tolerate  Indications: Cardiac Failure    VITAMIN D, CHOLECALCIFEROL, PO   " "Yes No    Take 1,000 Units by mouth Daily.          ED Medications:    Medications   amiodarone 150 mg in 100 mL D5W (loading dose) (0 mg Intravenous Stopped 4/7/25 1821)     Followed by   amiodarone 360 mg in 200 mL D5W infusion (1 mg/min Intravenous Currently Infusing 4/7/25 2233)     Followed by   amiodarone 360 mg in 200 mL D5W infusion (has no administration in time range)   norepinephrine (LEVOPHED) 8 mg in 250mL D5W infusion (0.04 mcg/kg/min × 90.7 kg Intravenous Currently Infusing 4/7/25 2215)   lidocaine (XYLOCAINE) 5 % ointment ( Topical Given 4/7/25 1815)   sodium chloride 0.9 % bolus 500 mL (0 mL Intravenous Stopped 4/7/25 1845)   etomidate (AMIDATE) injection 9 mg (9 mg Intravenous Given 4/7/25 1835)   sodium chloride 0.9 % bolus 500 mL (0 mL Intravenous Stopped 4/7/25 1927)   sodium chloride 0.9 % bolus 1,000 mL (0 mL Intravenous Stopped 4/7/25 1958)   cefepime 2000 mg IVPB in 100 mL NS (VTB) (0 mg Intravenous Stopped 4/7/25 1956)   vancomycin 1750 mg/500 mL 0.9% NS IVPB ([facility]) (0 mg Intravenous Stopped 4/7/25 2137)   iopamidol (ISOVUE-300) 61 % injection 100 mL (100 mL Intravenous Given 4/7/25 2107)     Vital Signs:  Temp:  [96.5 °F (35.8 °C)-97.2 °F (36.2 °C)] 97.2 °F (36.2 °C)  Heart Rate:  [] 64  Resp:  [16] 16  BP: ()/(41-92) 115/59        04/07/25  1732   Weight: 90.7 kg (200 lb)     Body mass index is 32.28 kg/m².    Physical Exam:     Most recent vital Signs: /59   Pulse 64   Temp 97.2 °F (36.2 °C) (Oral)   Resp 16   Ht 167.6 cm (66\")   Wt 90.7 kg (200 lb)   SpO2 98%   BMI 32.28 kg/m²     Physical Exam  Constitutional:       General: He is not in acute distress.     Appearance: He is ill-appearing. He is not toxic-appearing.   HENT:      Mouth/Throat:      Mouth: Mucous membranes are moist.   Eyes:      Extraocular Movements: Extraocular movements intact.   Cardiovascular:      Rate and Rhythm: Normal rate and regular rhythm.      Pulses: Normal pulses.      Heart " sounds: Normal heart sounds.   Pulmonary:      Effort: Pulmonary effort is normal.      Breath sounds: Normal breath sounds.   Abdominal:      Palpations: Abdomen is soft.      Tenderness: There is no abdominal tenderness.   Musculoskeletal:      Right lower leg: No edema.      Left lower leg: No edema.      Comments: Superficial chronic leg wound, see photo   Skin:     General: Skin is warm.   Neurological:      General: No focal deficit present.      Mental Status: He is alert and oriented to person, place, and time.   Psychiatric:         Mood and Affect: Mood normal.         Thought Content: Thought content normal.         Laboratory data:    I have reviewed the labs done in the emergency room.    Results from last 7 days   Lab Units 04/07/25  1753   SODIUM mmol/L 131*   POTASSIUM mmol/L 5.0   CHLORIDE mmol/L 94*   CO2 mmol/L 21.5*   BUN mg/dL 95*   CREATININE mg/dL 2.83*   CALCIUM mg/dL 8.0*   BILIRUBIN mg/dL 1.3*   ALK PHOS U/L 184*   ALT (SGPT) U/L 77*   AST (SGOT) U/L 88*   GLUCOSE mg/dL 146*     Results from last 7 days   Lab Units 04/07/25  1753   WBC 10*3/mm3 6.87   HEMOGLOBIN g/dL 12.2*   HEMATOCRIT % 37.1*   PLATELETS 10*3/mm3 137*         Results from last 7 days   Lab Units 04/07/25  1910 04/07/25  1753   HSTROP T ng/L 41* 101*     Results from last 7 days   Lab Units 04/07/25  1753   PROBNP pg/mL 18,393.0*                 Results from last 7 days   Lab Units 04/06/25  1330   COLOR UA  Dark Yellow*   GLUCOSE UA  Negative   KETONES UA  Negative   BLOOD UA  Negative   LEUKOCYTES UA  Small (1+)*   PH, URINE  <=5.0   BILIRUBIN UA  Negative   UROBILINOGEN UA  1.0 E.U./dL   RBC UA /HPF 0-2   WBC UA /HPF 6-10*       Pain Management Panel  More data exists         4/4/2024 12/29/2022   Pain Management Panel   Creatinine, Urine 10  72.7          Radiology:    CT Abdomen Pelvis With Contrast  Result Date: 4/7/2025  FINAL REPORT TECHNIQUE: null CLINICAL HISTORY: Hypotension, shock COMPARISON: null FINDINGS: CT  abdomen and pelvis with contrast Comparison: CT - CT ANGIOGRAM CHEST PULMONARY EMBOLISM - 4/7/25 20:55 EDT Findings: Large right and small left pleural effusions with associated atelectasis. Cardiomegaly. Relatively small lobulated liver. No bile duct dilatation. Main portal vein 1.7 cm diameter with recanalized umbilical vein collateral. 2 mm nonobstructing left inferior renal stone. Subcentimeter left mid renal cyst. Right nephrectomy. Edematous gallbladder. No bile duct dilatation. Abdominal solid organs otherwise unremarkable. There is wall thickening of the ascending colon and distal rectum with no inflammatory change. No bowel obstruction, pneumoperitoneum, or pneumatosis. Minimal upper abdominal ascites. Pelvic contents unremarkable. Normal appendix. Prostatectomy. Urinary bladder is partially decompressed and mildly thick-walled as a result. Small right superior urinary bladder diverticulum. Grade 1 chronic dyspneic L5-S1 spondylolisthesis. Moderate to severe lumbar degenerative spondylosis. No acute fracture. Generalized edema, especially in the subcutaneous tissues.     IMPRESSION: 1. 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 2. Edematous gallbladder is likely secondary to 3rd spacing of fluid rather than cholecystitis. 3. Thick-walled ascending colon and distal rectum are indeterminate. Correlate with any signs or symptoms of colitis. 4. Additional findings as above. Authenticated and Electronically Signed by Ventura Obando MD on 04/07/2025 09:55:20 PM    CT Angiogram Chest Pulmonary Embolism  Result Date: 4/7/2025  FINAL REPORT TECHNIQUE: null CLINICAL HISTORY: Hypotension, shock COMPARISON: null FINDINGS: CT angiography chest with contrast. 3D Postprocessing. Comparison: CT - CT ABDOMEN PELVIS W CONTRAST - 4/7/25 20:55 EDT CT/SR - CT ANGIOGRAM CHEST PULMONARY EMBOLISM - 12/21/24 19:34 EST Findings: Cardiomegaly. No pericardial effusion. RV/LV ratio normal. Ascending aorta 4.6  cm diameter. Lumen is not opacified. Similar to prior. Enlarged pulmonary artery. This can be seen with pulmonary artery hypertension. The visualized thyroid and mediastinum are unremarkable. Lower lobe pulmonary arteries are not opacified. Likely due to poor cardiac output. Large right and small left pleural effusions. Dependent atelectasis both lungs, more prominent on the right. No pneumothorax. Abdominal findings are discussed on the same day comparison. No acute fractures.     IMPRESSION: 1. Bilateral pleural effusions. Given the abnormal findings, this could be secondary to decompensated cirrhotic portal hypertension. 2. No acute pulmonary embolus within the limitations of the exam. 3. 4.6 cm diameter dilated ascending aorta. Recommend follow-up as needed. 4. Additional findings as above. Authenticated and Electronically Signed by Ventura Obando MD on 04/07/2025 09:54:16 PM    XR Chest 1 View  Result Date: 4/7/2025  FINAL REPORT TECHNIQUE: null CLINICAL HISTORY: SOA COMPARISON: null FINDINGS: Single view of the chest. COMPARISON: None FINDINGS: Status post sternotomy. Cardiomegaly. Atherosclerotic thoracic aorta. Mild prominence of the central pulmonary vasculature. Blunting of the right costophrenic angle. No pneumothorax. No consolidation. No fracture identified. Mild spondylosis.     IMPRESSION: 1. Small right pleural effusion. 2. Cardiomegaly with likely mild pulmonary vascular congestion. Authenticated and Electronically Signed by Jessee Scott MD on 04/07/2025 07:09:41 PM      Assessment/Plan:    Inpatient ICU admission 4/7/2025 with atrial fibrillation with RVR status post cardioversion in ED, hypotension likely multifactorial requiring Levophed, elevated troponins ACS not suspected likely demand ischemia, COVID-19 infection on his baseline 2 L, acute exacerbation of heart failure reduced ejection fraction, hyponatremia, GERALD on CKD, transaminitis with bilirubinemia in the setting of  cirrhosis.    Atrial fibrillation with RVR, resolved  Amiodarone IV.  Cardiology consultation, recommendations appreciated.  Status post cardioversion in the ED.  Hypotension  Levophed.  Central line placed in ED.  Received 2L IVF in ED.  Colitis, suspected  UTI  Vancomycin and Zosyn out of an abundance of caution.  Blood cultures.  Add urine culture.  Unsure if hypotension has an element of septic shock, likely multifactorial given multiple chronic severe conditions.  Elevated troponins  ACS not suspected, likely demand ischemia.  COVID-19 infection  On his 2 L baseline.  Acute exacerbation of heart failure reduced ejection fraction  Hyponatremia  GERALD on CKD  Nephrology consultation, recommendations appreciated.  Lasix.  Transaminitis  Hyperbilirubinemia  Right upper quadrant ultrasound.  Likely secondary to chronic cirrhosis.  Chronic leg wound  Wound care.  AAA  Recommend follow-up.    Chronic: Heart failure reduced ejection fraction, moderate AR, moderate MR, type 2 diabetes, cirrhosis, hypertension, prostate cancer, aortic stenosis status post AVR, HUSSAIN on CPAP, hyperlipidemia, history of diffuse large B-cell lymphoma of kidney, CKD, atrial fibrillation  Subcutaneous insulin protocol.  Continue other home medications.    Risk Assessment: High  DVT Prophylaxis: Eliquis  Code Status: DNR/DNI  Diet: N.p.o.    Advance Care Planning   ACP discussion was held with the patient during this visit. Patient does not have an advance directive, information provided.           Brian Joseph Kerley, DO  04/07/25  22:59 EDT    Dictated utilizing Dragon dictation.

## 2025-04-08 NOTE — PROGRESS NOTES
Pharmacy Consult-Vancomycin Dosing  Blane Dietz is a  89 y.o. male receiving vancomycin therapy.     Indication: Intra-abdominal infection, Pneumonia, UTI  Consulting Provider: Dr. Kerley    Goal Trough: 15 to 20 mcg/mL    Current Antimicrobial Therapy  Anti-Infectives (From admission, onward)      Ordered     Dose/Rate Route Frequency Start Stop    04/08/25 0140  cefepime 2000 mg IVPB in 100 mL NS (VTB)        Ordering Provider: Kerley, Brian Joseph, DO    2,000 mg  over 4 Hours Intravenous Every 24 Hours 04/08/25 1900 04/14/25 1859    04/08/25 0148  vancomycin (dosing per levels)        Ordering Provider: Kerley, Brian Joseph, DO     Not Applicable Daily 04/08/25 0900 04/14/25 0859    04/08/25 0132  Pharmacy to dose vancomycin        Ordering Provider: Kerley, Brian Joseph, DO     Not Applicable Continuous PRN 04/08/25 0132 04/15/25 0131    04/07/25 1857  cefepime 2000 mg IVPB in 100 mL NS (VTB)        Ordering Provider: Shelton Hartley DO    2,000 mg  over 30 Minutes Intravenous Once 04/07/25 1913 04/07/25 1956    04/07/25 1857  vancomycin 1750 mg/500 mL 0.9% NS IVPB ([facility])        Ordering Provider: Shelton Hartley DO    20 mg/kg × 90.7 kg  over 105 Minutes Intravenous Once 04/07/25 1913 04/07/25 2137            Allergies  Allergies as of 04/07/2025 - Reviewed 04/07/2025   Allergen Reaction Noted    Ampicillin Anaphylaxis 07/06/2016    Medrol [methylprednisolone] Unknown - High Severity 07/21/2017    Myrbetriq [mirabegron] Unknown - High Severity 05/07/2018    Penicillins Anaphylaxis and Shortness Of Breath 06/13/2016    Bee venom Swelling 08/25/2016    Melatonin Hallucinations 06/14/2022       Labs    Results from last 7 days   Lab Units 04/08/25  0441 04/07/25  1753   BUN mg/dL 94* 95*   CREATININE mg/dL 2.74* 2.83*       Results from last 7 days   Lab Units 04/08/25  0441 04/07/25  1753   WBC 10*3/mm3 8.71 6.87       Evaluation of Dosing     Last Dose Received in the ED/Outside Facility: Yes  Is Patient on  "Dialysis or Renal Replacement: No    Ht - 167.6 cm (66\")  Wt - 87 kg (191 lb 12.8 oz)    Estimated Creatinine Clearance: 19.6 mL/min (A) (by C-G formula based on SCr of 2.74 mg/dL (H)).    Intake & Output (last 3 days)         04/05 0701  04/06 0700 04/06 0701  04/07 0700 04/07 0701 04/08 0700    I.V. (mL/kg)   455.4 (5.2)    IV Piggyback   500    Total Intake(mL/kg)   955.4 (11)    Net   +955.4                   Microbiology and Radiology  Microbiology Results (last 10 days)       Procedure Component Value - Date/Time    Respiratory Panel PCR w/COVID-19(SARS-CoV-2) [facility], NP Swab in UTM/VTM, 2 HR TAT - Swab, Nasopharynx [[id number]]  (Abnormal) Collected: 04/07/25 1956    Lab Status: Final result Specimen: Swab from Nasopharynx Updated: 04/07/25 2047     ADENOVIRUS, PCR Not Detected     Coronavirus 229E Not Detected     Coronavirus HKU1 Not Detected     Coronavirus NL63 Not Detected     Coronavirus OC43 Not Detected     COVID19 Detected     Human Metapneumovirus Not Detected     Human Rhinovirus/Enterovirus Not Detected     Influenza A PCR Not Detected     Influenza B PCR Not Detected     Parainfluenza Virus 1 Not Detected     Parainfluenza Virus 2 Not Detected     Parainfluenza Virus 3 Not Detected     Parainfluenza Virus 4 Not Detected     RSV, PCR Not Detected     Bordetella pertussis pcr Not Detected     Bordetella parapertussis PCR Not Detected     Chlamydophila pneumoniae PCR Not Detected     Mycoplasma pneumo by PCR Not Detected    Narrative:      In the setting of a positive respiratory panel with a viral infection PLUS a negative procalcitonin without other underlying concern for bacterial infection, consider observing off antibiotics or discontinuation of antibiotics and continue supportive care. If the respiratory panel is positive for atypical bacterial infection (Bordetella pertussis, Chlamydophila pneumoniae, or Mycoplasma pneumoniae), consider antibiotic de-escalation to " target atypical bacterial infection.    Urine Culture - Urine, Urine, Clean Catch [[id number]] Collected: 04/06/25 1330    Lab Status: Final result Specimen: Urine, Clean Catch Updated: 04/07/25 1154     Urine Culture 50,000 CFU/mL Mixed Sissy Isolated    Narrative:      Specimen contains mixed organisms of questionable pathogenicity suggestive of contamination. If symptoms persist, suggest recollection.  Colonization of the urinary tract without infection is common. Treatment is discouraged unless the patient is symptomatic, pregnant, or undergoing an invasive urologic procedure.            Vancomycin Levels:    Results from last 7 days   Lab Units 04/08/25  0441   VANCOMYCIN RM mcg/mL 16.70                   Assessment/Plan    Pharmacy to dose vancomycin for intra-abdominal infection, UTI, pneumonia. Goal trough 15 to 20 mcg/mL.  Patient currently receiving vancomycin dosed per level due to GERALD. Will order vancomycin 1000 mg for one dose.  Assess clearance by vancomycin random level on 4/9 @ 0600  Pharmacy will continue to monitor renal function, cultures and sensitivities, and clinical status to adjust regimen as necessary.    Thank you for the consult,    Joe Chaparro, AmariD, BCPS   04/08/25 06:38 EDT

## 2025-04-08 NOTE — PLAN OF CARE
Goal Outcome Evaluation: Pt tolerating home c-pap.  Pressor ongoing to maintain blood pressure.  Blister on left lower leg cleaned and dressed.  Pt denies any pain currently.

## 2025-04-08 NOTE — CASE MANAGEMENT/SOCIAL WORK
Case Management/Social Work    Patient Name:  Blane Dietz  YOB: 1935  MRN: [id number]  Admit Date:  4/7/2025        11:32 EDT   Discharge Plan       Row Name 04/08/25 1128       Plan    Plan Comments Pt provided dcp information. His daughter is POA, he has no AD or financial stressors. He uses a walker, cpap, has inogen (private purchase), and has O2 2L from [employer] continuous.  is primary, Kayla is pharmacy, enrolled in meds to [facility]. Pt has been at [facility] for 3 years, goal is to return, dcp tbd.                        Electronically signed by:  Gina Molina RN  04/08/25 11:32 EDT

## 2025-04-08 NOTE — PROGRESS NOTES
Pharmacokinetic Consult - Cefepime Dosing    Blane Dietz is a 89 y.o. male who has been consulted to dose Cefepime for  possible septic shock .    Current Antimicrobial Therapy    Anti-Infectives (From admission, onward)      Ordered     Dose/Rate Route Frequency Start Stop    04/08/25 0140  cefepime 2000 mg IVPB in 100 mL NS (VTB)        Ordering Provider: Kerley, Brian Joseph, DO    2,000 mg  over 4 Hours Intravenous Every 24 Hours 04/08/25 1900 04/14/25 1859    04/08/25 0148  vancomycin (dosing per levels)        Ordering Provider: Kerley, Brian Joseph, DO     Not Applicable Daily 04/08/25 0900 04/14/25 0859    04/08/25 0132  Pharmacy to dose vancomycin        Ordering Provider: Kerley, Brian Joseph, DO     Not Applicable Continuous PRN 04/08/25 0132 04/15/25 0131    04/07/25 1857  cefepime 2000 mg IVPB in 100 mL NS (VTB)        Ordering Provider: Shelton Hartley DO    2,000 mg  over 30 Minutes Intravenous Once 04/07/25 1913 04/07/25 1956 04/07/25 1857  vancomycin 1750 mg/500 mL 0.9% NS IVPB ([facility])        Ordering Provider: Shelton Hartley DO    20 mg/kg × 90.7 kg  over 105 Minutes Intravenous Once 04/07/25 1913 04/07/25 2137            Microbiology Results (last 10 days)       Procedure Component Value - Date/Time    Respiratory Panel PCR w/COVID-19(SARS-CoV-2) [facility]-House, NP Swab in UTM/VTM, 2 HR TAT - Swab, Nasopharynx [[id number]]  (Abnormal) Collected: 04/07/25 1956    Lab Status: Final result Specimen: Swab from Nasopharynx Updated: 04/07/25 2047     ADENOVIRUS, PCR Not Detected     Coronavirus 229E Not Detected     Coronavirus HKU1 Not Detected     Coronavirus NL63 Not Detected     Coronavirus OC43 Not Detected     COVID19 Detected     Human Metapneumovirus Not Detected     Human Rhinovirus/Enterovirus Not Detected     Influenza A PCR Not Detected     Influenza B PCR Not Detected     Parainfluenza Virus 1 Not Detected     Parainfluenza Virus 2 Not Detected     Parainfluenza  Virus 3 Not Detected     Parainfluenza Virus 4 Not Detected     RSV, PCR Not Detected     Bordetella pertussis pcr Not Detected     Bordetella parapertussis PCR Not Detected     Chlamydophila pneumoniae PCR Not Detected     Mycoplasma pneumo by PCR Not Detected    Narrative:      In the setting of a positive respiratory panel with a viral infection PLUS a negative procalcitonin without other underlying concern for bacterial infection, consider observing off antibiotics or discontinuation of antibiotics and continue supportive care. If the respiratory panel is positive for atypical bacterial infection (Bordetella pertussis, Chlamydophila pneumoniae, or Mycoplasma pneumoniae), consider antibiotic de-escalation to target atypical bacterial infection.    Urine Culture - Urine, Urine, Clean Catch [[id number]] Collected: 04/06/25 1330    Lab Status: Final result Specimen: Urine, Clean Catch Updated: 04/07/25 1154     Urine Culture 50,000 CFU/mL Mixed Sissy Isolated    Narrative:      Specimen contains mixed organisms of questionable pathogenicity suggestive of contamination. If symptoms persist, suggest recollection.  Colonization of the urinary tract without infection is common. Treatment is discouraged unless the patient is symptomatic, pregnant, or undergoing an invasive urologic procedure.             Allergies  Ampicillin, Medrol [methylprednisolone], Myrbetriq [mirabegron], Penicillins, Bee venom, and Melatonin    Relevant clinical data and objective history reviewed:  Creatinine   Date Value Ref Range Status   04/07/2025 2.83 (H) 0.76 - 1.27 mg/dL Final   11/30/2021 0.90 0.60 - 1.30 mg/dL Final     Comment:     Serial Number: [id number]:  [id number]     Estimated Creatinine Clearance: 19 mL/min (A) (by C-G formula based on SCr of 2.83 mg/dL (H)).     No intake/output data recorded.    Patient weight: 87 kg (191 lb 12.8 oz)    Asessment/Plan     Initiate Cefepime 2000 mg IV every 24 hours for 6 additional  dosess  Pharmacy will monitor Mr. Dietz's renal function and clinical status and adjust the cefepime dose and/or frequency as needed.    Thank you,  Raiza Abarca RPH,PharmD  4/8/2025  02:10 EDT

## 2025-04-08 NOTE — CONSULTS
[facility]      Nephrology Consultation      Referring Provider:   Shelton Hartley DO    Reason for Consultation:  Acute kidney injury associated problems.    Subjective:  Chief complaint   Chief Complaint   Patient presents with    Shortness of Breath     History of present illness:    Patient is 89-year-old with multimedical problems including atrial fibrillation and had a procedure for aortic and pulmonic valves 25 years ago involving a porcine valve.  Recurrent episodes of CHF with last documented EF of 36 to 40% with associated moderate mitral regurgitation.  Patient does have chronic respiratory failure on 2 L nasal cannula oxygen.  Patient also has history of type 2 diabetes, cirrhosis of the liver, hypertension, prostate cancer, obstructive sleep apnea, dyslipidemia, history of diffuse large B-cell lymphoma and chronic kidney disease stage IIIb/IV.  He was brought into the emergency room yesterday after there was a concern of several weeks of diarrhea as well as shortness of breath and cough.  Family reports that he has not been taking his medications for several days as well.  He denies having any chest pain or abdominal pain.  On further workup he was noted to have significant worsening of his renal function with a BUN/creatinine of 95/2.33 with a EGFR of 20; blood sugar was 246.  Patient had a contrast CT showing no acute PE and decompensated portal hypertension as well as cirrhosis of the liver.  He was admitted to the hospitalist service for further evaluation and treatment I was consulted for worsening renal function.  Currently patient is laying in the bed awake alert and interactive denies having any chest pain but does not feel that there is any improvement in his shortness of breath and feels about the same.  Patient been treated with IV amiodarone for A-fib patient did get cardioversion in the ER with some improvement.  I have reviewed labs/imaging/records from this  hospitalization, including ER staff and admitting/attending physicians H/P's and progress notes to establish a comprehensive understanding of this patient's clinical hospital course, as well as to establish plan of care appropriately.     Past Medical History:   Diagnosis Date    Aortic stenosis     status post ROSS procedure, remote, with December 2015 echocardiography revealing normal aortic and pulmonary valve function, normal ejection fraction and mild to moderate MR    Arthritis     Bilateral impacted cerumen 11/23/2016    Bronchitis     CHF (congestive heart failure)     Chronic gout of right foot 06/13/2016    Impression: 03/08/2016 - stable.;     COVID-19 vaccine series completed 05/12/2021    Maderna 2nd dose 3/12/21.    Depression     Diabetes mellitus     Diverticulitis     Exogenous obesity     Gout     Heart murmur     History of vitamin D deficiency     Hypercholesteremia 08/11/2016    Hyperlipidemia     Hypertension     Impaired mobility     Kidney lesion     Malignant neoplasm of prostate     Morbid obesity 08/11/2016    Pneumonia 05/12/2021    Primary osteoarthritis involving multiple joints 06/13/2016    Impression: 03/08/2016 - stable;     Pulmonary embolism     Sleep apnea 05/12/2021    C-Pap    Uncontrolled type 2 diabetes mellitus with hyperglycemia 06/13/2016    Impression: 03/08/2016 - seeing Endo .;     Vertigo        Past Surgical History:   Procedure Laterality Date    CARDIAC VALVE REPLACEMENT  05/12/2021    Ross procedure 22 years ago    COLON SURGERY      COLONOSCOPY      COLOSTOMY  1999    COLOSTOMY REVISION      CYSTOSCOPY N/A 12/7/2021    Procedure: CYSTOSCOPY FLEXIBLE;  Surgeon: José Miguel Villafuerte Jr., MD;  Location: [facility];  Service: Urology;  Laterality: N/A;    EXPLORATORY LAPAROTOMY      With Tissue Removal    LIPOMA EXCISION      MOHS SURGERY      NEPHROURETERECTOMY Right 12/7/2021    Procedure: RIGHT NEPHROURETERECTOMY LAPAROSCOPIC WITH DAVINCI ROBOT;  Surgeon: Noy  "José Miguel Angel Jr., MD;  Location: [facility];  Service: Robotics - DaVinci;  Laterality: Right;    OTHER SURGICAL HISTORY      Porcine Valve    PROSTATE SURGERY      PROSTATECTOMY  2000     History of Prostatectomy Perineal Radical    SKIN CANCER EXCISION      from head and lip    TONSILLECTOMY       Family History   Problem Relation Age of Onset    Diabetes Mother     Lung cancer Mother     Cancer Mother     Heart disease Father     Breast cancer Other     Cancer Other     Hypertension Other     Migraines Other     Obesity Other     Diabetes Other      negative h/o ESRD     Social History     Tobacco Use    Smoking status: Never     Passive exposure: Never    Smokeless tobacco: Never   Vaping Use    Vaping status: Never Used   Substance Use Topics    Alcohol use: No    Drug use: No     Home medications:   Prior to Admission Medications       Prescriptions Last Dose Informant Patient Reported? Taking?    allopurinol (ZYLOPRIM) 100 MG tablet   No No    Take 1 tablet by mouth Daily. Hold for now.  Can restart Sunday.  Indications: Gout    amiodarone (PACERONE) 200 MG tablet   Yes No    Take 1 tablet by mouth Daily.    Apoaequorin (PREVAGEN PO)   Yes No    Take  by mouth.    ascorbic acid (VITAMIN C) 1000 MG tablet   Yes No    Take 1 tablet by mouth Daily.    Calcium Carb-Cholecalciferol 600-5 MG-MCG tablet   No No    Take 1 tablet by mouth Every Other Day. Indications: Low Amount of Calcium in the Blood    CINNAMON PO   Yes No    Take 1 tablet by mouth Daily. Patient unsure of dose    Droplet Insulin Syringe 31G X 5/16\" 0.5 ML misc   Yes No    empagliflozin (Jardiance) 25 MG tablet tablet   No No    Take 1 tablet by mouth Daily. Hold for now.  Can restart Sunday.    enzalutamide (XTANDI) 40 MG tablet tablet   No No    Take 3 tablets by mouth Daily.    fluticasone (Flonase) 50 MCG/ACT nasal spray   No No    Administer 2 sprays into the nostril(s) as directed by provider Daily As Needed for Rhinitis or Allergies.    " furosemide (LASIX) 40 MG tablet   No No    Take 1 tablet by mouth Daily. Hold for now.  Can restart Sunday.    glucose blood (True Metrix Blood Glucose Test) test strip   No No    Use to test blood sugar 3 times daily.  Dx E11.65    Lantus 100 UNIT/ML injection   No No    Inject up to 50 units daily subcutaneously    Patient taking differently:  Inject up to 20 units daily subcutaneously    metoprolol succinate XL (TOPROL-XL) 25 MG 24 hr tablet   No No    Take 1 tablet by mouth Daily.    pravastatin (PRAVACHOL) 40 MG tablet   No No    TAKE ONE TABLET BY MOUTH EVERY NIGHT AT BEDTIME    sacubitril-valsartan (Entresto) 24-26 MG tablet   No No    Take 1 tablet by mouth Every 12 (Twelve) Hours. Hold for now- kidneys unable to tolerate  Indications: Cardiac Failure    VITAMIN D, CHOLECALCIFEROL, PO   Yes No    Take 1,000 Units by mouth Daily.          Emergency department medications:   Medications   amiodarone 150 mg in 100 mL D5W (loading dose) (0 mg Intravenous Stopped 4/7/25 1821)     Followed by   amiodarone 360 mg in 200 mL D5W infusion (0 mg/min Intravenous Stopped 4/8/25 0004)     Followed by   amiodarone 360 mg in 200 mL D5W infusion (0.5 mg/min Intravenous Currently Infusing 4/8/25 0241)   norepinephrine (LEVOPHED) 8 mg in 250mL D5W infusion (0.04 mcg/kg/min × 90.7 kg Intravenous Currently Infusing 4/8/25 0241)   metoprolol succinate XL (TOPROL-XL) 24 hr tablet 25 mg (25 mg Oral Given 4/8/25 1039)   pravastatin (PRAVACHOL) tablet 40 mg (40 mg Oral Given 4/8/25 1039)   sodium chloride 0.9 % flush 10 mL (10 mL Intravenous Given 4/8/25 1041)   sodium chloride 0.9 % flush 10 mL (has no administration in time range)   sodium chloride 0.9 % infusion 40 mL (has no administration in time range)   ondansetron (ZOFRAN) injection 4 mg (has no administration in time range)   nitroglycerin (NITROSTAT) SL tablet 0.4 mg (has no administration in time range)   Pharmacy to dose vancomycin (has no administration in time range)  "  Pharmacy Consult (has no administration in time range)   dextrose (GLUTOSE) oral gel 15 g (has no administration in time range)   dextrose (D50W) (25 g/50 mL) IV injection 25 g (has no administration in time range)   glucagon (GLUCAGEN) injection 1 mg (has no administration in time range)   Insulin Lispro (humaLOG) injection 2-9 Units ( Subcutaneous Not Given 4/8/25 1154)   apixaban (ELIQUIS) tablet 2.5 mg (2.5 mg Oral Not Given 4/8/25 1132)   cefepime 2000 mg IVPB in 100 mL NS (VTB) (has no administration in time range)   vancomycin (dosing per levels) ( Not Applicable Canceled Entry 4/8/25 0900)   mupirocin (BACTROBAN) 2 % nasal ointment 1 Application (1 Application Each Nare Given 4/8/25 1039)   lidocaine (XYLOCAINE) 5 % ointment ( Topical Given 4/7/25 1815)   sodium chloride 0.9 % bolus 500 mL (0 mL Intravenous Stopped 4/7/25 1845)   etomidate (AMIDATE) injection 9 mg (9 mg Intravenous Given 4/7/25 1835)   sodium chloride 0.9 % bolus 500 mL (0 mL Intravenous Stopped 4/7/25 1927)   sodium chloride 0.9 % bolus 1,000 mL (0 mL Intravenous Stopped 4/7/25 1958)   cefepime 2000 mg IVPB in 100 mL NS (VTB) (0 mg Intravenous Stopped 4/7/25 1956)   vancomycin 1750 mg/500 mL 0.9% NS IVPB ([facility]) (0 mg Intravenous Stopped 4/7/25 2137)   iopamidol (ISOVUE-300) 61 % injection 100 mL (100 mL Intravenous Given 4/7/25 2107)   vancomycin (VANCOCIN) 1,000 mg in sodium chloride 0.9 % 250 mL IVPB-VTB (1,000 mg Intravenous New Bag 4/8/25 1041)       Allergies:  Ampicillin, Medrol [methylprednisolone], Myrbetriq [mirabegron], Penicillins, Bee venom, and Melatonin    Review of Systems  14 point review of system were done and are negative except as mentioned in the history of present illness and assessment and plan.      Physical Exam:  Objective:  Vital Signs  /85   Pulse 60   Temp 98.2 °F (36.8 °C) (Oral)   Resp 23   Ht 167.6 cm (66\")   Wt 87 kg (191 lb 12.8 oz)   SpO2 97%   BMI 30.96 kg/m²   Objective    No intake/output " data recorded.    Intake/Output Summary (Last 24 hours) at 4/8/2025 1159  Last data filed at 4/8/2025 0600  Gross per 24 hour   Intake 955.39 ml   Output --   Net 955.39 ml        Physical Exam     Constitutional: Awake, alert  Eyes: sclerae anicteric, no conjunctival injection  HEENT: mucous membranes dry  Neck: Supple, no thyromegaly, no lymphadenopathy, trachea midline, No JVD  Respiratory: Decreased breath sounds all over the chest with bibasilar crackles.  Cardiovascular: IRRR, positive murmurs, no rubs, or gallops.  Gastrointestinal: Positive bowel sounds, obese, soft, nontender, nondistended  Musculoskeletal: 1+ edema, no clubbing or cyanosis, both lower extremities have some redness.  Psychiatric: Appropriate affect, cooperative  Neurologic: Oriented x 3, moving all extremities, Cranial Nerves grossly intact, speech clear  Skin: warm and dry, no rashes            Results Review:   Results from last 7 days   Lab Units 04/08/25  0441 04/07/25  1753   SODIUM mmol/L 134* 131*   POTASSIUM mmol/L 4.8 5.0   CHLORIDE mmol/L 97* 94*   CO2 mmol/L 17.3* 21.5*   BUN mg/dL 94* 95*   CREATININE mg/dL 2.74* 2.83*   CALCIUM mg/dL 7.4* 8.0*   ALBUMIN g/dL 3.7 4.0   BILIRUBIN mg/dL 1.2 1.3*   ALK PHOS U/L 163* 184*   ALT (SGPT) U/L 82* 77*   AST (SGOT) U/L 93* 88*   GLUCOSE mg/dL 183* 146*     Estimated Creatinine Clearance: 19.6 mL/min (A) (by C-G formula based on SCr of 2.74 mg/dL (H)).  Results from last 7 days   Lab Units 04/07/25  1753   MAGNESIUM mg/dL 2.8*   PHOSPHORUS mg/dL 6.4*         Results from last 7 days   Lab Units 04/08/25  0441 04/07/25  1753   WBC 10*3/mm3 8.71 6.87   HEMOGLOBIN g/dL 12.0* 12.2*   PLATELETS 10*3/mm3 180 137*         Brief Urine Lab Results  (Last result in the past 365 days)        Color   Clarity   Blood   Leuk Est   Nitrite   Protein   CREAT   Urine HCG        04/06/25 1330 Dark Yellow   Cloudy   Negative   Small (1+)   Negative   30 mg/dL (1+)                 No results found for:  "\"UTPCR\"  Imaging Results (Last 24 Hours)       Procedure Component Value Units Date/Time    CT Angiogram Chest Pulmonary Embolism [[id number]] Collected: 04/07/25 2154     Updated: 04/07/25 2155    Narrative:      FINAL REPORT    TECHNIQUE:  null    CLINICAL HISTORY:  Hypotension, shock    COMPARISON:  null    FINDINGS:  CT angiography chest with contrast. 3D Postprocessing.    Comparison: CT - CT ABDOMEN PELVIS W CONTRAST - 4/7/25 20:55 EDT    CT/SR - CT ANGIOGRAM CHEST PULMONARY EMBOLISM - 12/21/24 19:34 EST    Findings:    Cardiomegaly. No pericardial effusion. RV/LV ratio normal.    Ascending aorta 4.6 cm diameter. Lumen is not opacified. Similar to prior.    Enlarged pulmonary artery. This can be seen with pulmonary artery hypertension.    The visualized thyroid and mediastinum are unremarkable.    Lower lobe pulmonary arteries are not opacified. Likely due to poor cardiac output.    Large right and small left pleural effusions.    Dependent atelectasis both lungs, more prominent on the right.    No pneumothorax.    Abdominal findings are discussed on the same day comparison.    No acute fractures.      Impression:      IMPRESSION:    1. Bilateral pleural effusions. Given the abnormal findings, this could be secondary to decompensated cirrhotic portal hypertension.    2. No acute pulmonary embolus within the limitations of the exam.    3. 4.6 cm diameter dilated ascending aorta. Recommend follow-up as needed.    4. Additional findings as above.    Authenticated and Electronically Signed by Ventura Obando MD  on 04/07/2025 09:54:16 PM    CT Abdomen Pelvis With Contrast [[id number]] Collected: 04/07/25 2155     Updated: 04/07/25 2155    Narrative:      FINAL REPORT    TECHNIQUE:  null    CLINICAL HISTORY:  Hypotension, shock    COMPARISON:  null    FINDINGS:  CT abdomen and pelvis with contrast    Comparison: CT - CT ANGIOGRAM CHEST PULMONARY EMBOLISM - 4/7/25 20:55 EDT    Findings:    Large right and small " left pleural effusions with associated atelectasis.    Cardiomegaly.    Relatively small lobulated liver. No bile duct dilatation. Main portal vein 1.7 cm diameter with recanalized umbilical vein collateral.    2 mm nonobstructing left inferior renal stone. Subcentimeter left mid renal cyst.    Right nephrectomy.    Edematous gallbladder. No bile duct dilatation.    Abdominal solid organs otherwise unremarkable.    There is wall thickening of the ascending colon and distal rectum with no inflammatory change.    No bowel obstruction, pneumoperitoneum, or pneumatosis.    Minimal upper abdominal ascites.    Pelvic contents unremarkable. Normal appendix.    Prostatectomy. Urinary bladder is partially decompressed and mildly thick-walled as a result. Small right superior urinary bladder diverticulum.    Grade 1 chronic dyspneic L5-S1 spondylolisthesis.    Moderate to severe lumbar degenerative spondylosis. No acute fracture.    Generalized edema, especially in the subcutaneous tissues.      Impression:      IMPRESSION:    1. 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    2. Edematous gallbladder is likely secondary to 3rd spacing of fluid rather than cholecystitis.    3. Thick-walled ascending colon and distal rectum are indeterminate. Correlate with any signs or symptoms of colitis.    4. Additional findings as above.    Authenticated and Electronically Signed by Ventura Obando MD  on 04/07/2025 09:55:20 PM    XR Chest 1 View [[id number]] Collected: 04/07/25 1909     Updated: 04/07/25 1910    Narrative:      FINAL REPORT    TECHNIQUE:  null    CLINICAL HISTORY:  SOA    COMPARISON:  null    FINDINGS:  Single view of the chest.    COMPARISON: None    FINDINGS:    Status post sternotomy.    Cardiomegaly. Atherosclerotic thoracic aorta. Mild prominence of the central pulmonary vasculature.    Blunting of the right costophrenic angle. No pneumothorax.    No consolidation.    No fracture identified.  Mild spondylosis.      Impression:      IMPRESSION:    1. Small right pleural effusion.    2. Cardiomegaly with likely mild pulmonary vascular congestion.    Authenticated and Electronically Signed by Jessee Scott MD on  04/07/2025 07:09:41 PM          apixaban, 2.5 mg, Oral, Q12H  cefepime, 2,000 mg, Intravenous, Q24H  insulin lispro, 2-9 Units, Subcutaneous, 4x Daily AC & at Bedtime  metoprolol succinate XL, 25 mg, Oral, Daily  mupirocin, 1 Application, Each Nare, BID  pravastatin, 40 mg, Oral, Daily  sodium chloride, 10 mL, Intravenous, Q12H  vancomycin (dosing per levels), , Not Applicable, Daily      amiodarone, 0.5 mg/min, Last Rate: 0.5 mg/min (04/08/25 0241)  norepinephrine, 0.02-0.3 mcg/kg/min, Last Rate: 0.04 mcg/kg/min (04/08/25 0241)  Pharmacy Consult,   Pharmacy to dose vancomycin,         Assessment/Plan:      COVID-19    Acute kidney injury: Patient does have significant worsening of renal function from his baseline likely hemodynamically mediated as well as cardiorenal syndrome.  Continue to optimize medications.  He does not have any significant electrolyte abnormalities bicarb is stable as well.  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  Valvular heart disease: Continue to optimize medications.  Coronary artery disease: Not a candidate for any aggressive cardiac workup, cardiac evaluation is in progress.  Congestive heart failure: Will need to optimize his volume status will adjust diuretics as needed.  Atrial fibrillation: Currently on amiodarone with some improvement in atrial fibrillation.  Type 2 diabetes: Continue with the sliding scale  Peripheral vascular disease: Continue to optimize medications no intervention planned.    Risk and complexity: High     Plan:  Patient did receive contrast, will cause some diuresis.  I will hold off giving any  diuretics today will have to be reassessed again in the morning for the diuretic use.  He likely will end up requiring fairly low-dose of diuretics likely starting tomorrow.  If needed during the day and able to give 1 dose of 80 mg of Lasix IV.  Continue with rest of the current treatment plan and surveillance labs, adjust medications to patient's GFR.  Details were discussed with the patient as well as family in the room.  Patient's niece at the bedside.  Details were also discussed with the hospitalist service.  Patient may be a candidate for palliative care consult.  Further recommendations will depend on clinical course of the patient during the current hospitalization.    I also discussed the details with the nursing staff.  Rest as ordered.    In closing, I sincerely appreciate opportunity to participate in care of this patient. If I can be of any further assistance with the management of this patient, please don’t hesitate to contact me.    Andriy Foote MD, LOAN    04/08/25  11:59 EDT    Dictated using Dragon.

## 2025-04-08 NOTE — PROGRESS NOTES
[facility]    Name:  Blane Dietz   Age:  89 y.o.  Sex:  male  :  1935  MRN:  [id number]   Visit Number:  [id number]  Admission Date:  2025  Date Of Service:  25  Primary Care Physician:  David Em MD     LOS: 1 day :    Chief Complaint:      Shortness of air, N/V/D     Subjective:    Patient was seen examined bedside today.  Patient sitting up comfortably in bed.  Patient fully AAO x 3.  Remains in ICU on pressors and Amio drip.  He denies any pain or shortness of breath currently.  He reports overall doing better however still feeling generally weak.  No other acute complaints today.  He is afebrile, on 3 L of nasal cannula.    Hospital Course:    Blane Dietz is an 89-year-old man with past medical history of heart failure reduced ejection fraction, moderate AR, moderate MR, type 2 diabetes, cirrhosis, hypertension, prostate cancer, aortic stenosis status post AVR, HUSSAIN on CPAP, hyperlipidemia, history of diffuse large B-cell lymphoma of kidney, CKD, atrial fibrillation.  Presented to [facility] ED on 2025 with concern for several weeks of various degrees of diarrhea, shortness of air with cough.  Family reports the patient actually has been off of this medication for several weeks.  Denied any chest pain, abdominal pain.  Says that he does feel somewhat short of air.  Denied any fevers or chills.     ED summary: Afebrile, atrial fibrillation with RVR rate as high as 160 resolved with cardioversion, hypotension resolved with fluids and Levophed, vital signs stable on 2 L baseline.  EKG initially atrial fibrillation with RVR rate 161.  After resolution sinus rhythm with first-degree AV block, right bundle branch block.  Troponin monitoring 1, 41, ACS not suspected.  proBNP over 18,000.  Sodium 131, anion gap 13.5, BUN 95, creatinine 2.83, EGFR 20, blood glucose 246, calcium 8.0, magnesium 2.8, Phos 6.4, , ALT 77, bilirubin 1.3.  Lactate  2.7, procalcitonin 0.25, no leukocytosis, hemoglobin 12.2.  Blood cultures.  COVID-19 positive.  CT angio chest bilateral pleural effusions, no acute PE, 4.6 cm diameter dilated ascending aorta.  CT ab/pelvis findings suggesting decompensated cirrhotic portal hypertension, edematous gallbladder likely secondary to third spacing, thick-walled descending colon and distal rectum indeterminant.  He was provided IV amiodarone, vancomycin and cefepime, etomidate, Xylocaine, 2 L bolus.  Started on Levophed.  ED physician plans on placing central line.    Review of Systems:     All systems were reviewed and negative except as mentioned in subjective, assessment and plan.    Vital Signs:    Temp:  [96.5 °F (35.8 °C)-98.2 °F (36.8 °C)] 98.2 °F (36.8 °C)  Heart Rate:  [] 66  Resp:  [16-28] 23  BP: ()/(41-92) 112/65    Intake and output:    I/O last 3 completed shifts:  In: 955.4 [I.V.:455.4; IV Piggyback:500]  Out: -   No intake/output data recorded.    Physical Examination:    General Appearance:  Alert and cooperative.  Chronically ill-appearing.  No acute distress.   Head:  Atraumatic and normocephalic.   Eyes: Conjunctivae and sclerae normal, no icterus. No pallor.   Throat: No oral lesions, no thrush, oral mucosa moist.   Neck: Supple, trachea midline, no thyromegaly.   Lungs:   Breath sounds heard bilaterally equally.  No wheezing.  Bibasilar rhonchi heard.  Decreased air movement bilaterally.  No Pleural rub or bronchial breathing.  Unlabored.  On supplemental oxygen.   Heart:  Normal S1 and S2, no murmur, no gallop, no rub. No JVD.   Abdomen:   Normal bowel sounds, no masses, no organomegaly. Soft, nontender, nondistended, no rebound tenderness.   Extremities: Supple, no edema, no cyanosis, no clubbing.    Skin: No bleeding.  Superficial chronic leg wound, see photo    Neurologic: Alert and oriented x 3. No facial asymmetry. Moves all four limbs. No tremors.      Laboratory results:    Results from last 7  "days   Lab Units 04/08/25  0441 04/07/25  1753   SODIUM mmol/L 134* 131*   POTASSIUM mmol/L 4.8 5.0   CHLORIDE mmol/L 97* 94*   CO2 mmol/L 17.3* 21.5*   BUN mg/dL 94* 95*   CREATININE mg/dL 2.74* 2.83*   CALCIUM mg/dL 7.4* 8.0*   BILIRUBIN mg/dL 1.2 1.3*   ALK PHOS U/L 163* 184*   ALT (SGPT) U/L 82* 77*   AST (SGOT) U/L 93* 88*   GLUCOSE mg/dL 183* 146*     Results from last 7 days   Lab Units 04/08/25  0441 04/07/25  1753   WBC 10*3/mm3 8.71 6.87   HEMOGLOBIN g/dL 12.0* 12.2*   HEMATOCRIT % 37.3* 37.1*   PLATELETS 10*3/mm3 180 137*         Results from last 7 days   Lab Units 04/07/25  1910 04/07/25  1753   HSTROP T ng/L 41* 101*     Results from last 7 days   Lab Units 04/06/25  1330   URINECX  50,000 CFU/mL Mixed Sissy Isolated     No results for input(s): \"PHART\", \"NSJ5DCZ\", \"PO2ART\", \"JTU1OOW\", \"BASEEXCESS\" in the last 8760 hours.   I have reviewed the patient's laboratory results.    Radiology results:    CT Abdomen Pelvis With Contrast  Result Date: 4/7/2025  FINAL REPORT TECHNIQUE: null CLINICAL HISTORY: Hypotension, shock COMPARISON: null FINDINGS: CT abdomen and pelvis with contrast Comparison: CT - CT ANGIOGRAM CHEST PULMONARY EMBOLISM - 4/7/25 20:55 EDT Findings: Large right and small left pleural effusions with associated atelectasis. Cardiomegaly. Relatively small lobulated liver. No bile duct dilatation. Main portal vein 1.7 cm diameter with recanalized umbilical vein collateral. 2 mm nonobstructing left inferior renal stone. Subcentimeter left mid renal cyst. Right nephrectomy. Edematous gallbladder. No bile duct dilatation. Abdominal solid organs otherwise unremarkable. There is wall thickening of the ascending colon and distal rectum with no inflammatory change. No bowel obstruction, pneumoperitoneum, or pneumatosis. Minimal upper abdominal ascites. Pelvic contents unremarkable. Normal appendix. Prostatectomy. Urinary bladder is partially decompressed and mildly thick-walled as a result. Small " right superior urinary bladder diverticulum. Grade 1 chronic dyspneic L5-S1 spondylolisthesis. Moderate to severe lumbar degenerative spondylosis. No acute fracture. Generalized edema, especially in the subcutaneous tissues.     Impression: IMPRESSION: 1. 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 2. Edematous gallbladder is likely secondary to 3rd spacing of fluid rather than cholecystitis. 3. Thick-walled ascending colon and distal rectum are indeterminate. Correlate with any signs or symptoms of colitis. 4. Additional findings as above. Authenticated and Electronically Signed by Ventura Obando MD on 04/07/2025 09:55:20 PM    CT Angiogram Chest Pulmonary Embolism  Result Date: 4/7/2025  FINAL REPORT TECHNIQUE: null CLINICAL HISTORY: Hypotension, shock COMPARISON: null FINDINGS: CT angiography chest with contrast. 3D Postprocessing. Comparison: CT - CT ABDOMEN PELVIS W CONTRAST - 4/7/25 20:55 EDT CT/SR - CT ANGIOGRAM CHEST PULMONARY EMBOLISM - 12/21/24 19:34 EST Findings: Cardiomegaly. No pericardial effusion. RV/LV ratio normal. Ascending aorta 4.6 cm diameter. Lumen is not opacified. Similar to prior. Enlarged pulmonary artery. This can be seen with pulmonary artery hypertension. The visualized thyroid and mediastinum are unremarkable. Lower lobe pulmonary arteries are not opacified. Likely due to poor cardiac output. Large right and small left pleural effusions. Dependent atelectasis both lungs, more prominent on the right. No pneumothorax. Abdominal findings are discussed on the same day comparison. No acute fractures.     Impression: IMPRESSION: 1. Bilateral pleural effusions. Given the abnormal findings, this could be secondary to decompensated cirrhotic portal hypertension. 2. No acute pulmonary embolus within the limitations of the exam. 3. 4.6 cm diameter dilated ascending aorta. Recommend follow-up as needed. 4. Additional findings as above. Authenticated and Electronically  Signed by Ventura Obando MD on 04/07/2025 09:54:16 PM    XR Chest 1 View  Result Date: 4/7/2025  FINAL REPORT TECHNIQUE: null CLINICAL HISTORY: SOA COMPARISON: null FINDINGS: Single view of the chest. COMPARISON: None FINDINGS: Status post sternotomy. Cardiomegaly. Atherosclerotic thoracic aorta. Mild prominence of the central pulmonary vasculature. Blunting of the right costophrenic angle. No pneumothorax. No consolidation. No fracture identified. Mild spondylosis.     Impression: IMPRESSION: 1. Small right pleural effusion. 2. Cardiomegaly with likely mild pulmonary vascular congestion. Authenticated and Electronically Signed by Jessee Scott MD on 04/07/2025 07:09:41 PM    I have reviewed the patient's radiology reports.    Medication Review:     I have reviewed the patient's active and prn medications.     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  Transaminitis  Hyperbilirubinemia  Chronic leg wound  AAA     Chronic: Heart failure reduced ejection fraction, moderate AR, moderate MR, type 2 diabetes, cirrhosis, hypertension, prostate cancer, aortic stenosis status post AVR, HUSSAIN on CPAP, hyperlipidemia, history of diffuse large B-cell lymphoma of kidney, CKD, atrial fibrillation    Plan:    Inpatient ICU admission 4/7/2025 with atrial fibrillation with RVR status post cardioversion in ED, hypotension likely multifactorial requiring Levophed, elevated troponins ACS not suspected likely demand ischemia, COVID-19 infection on his baseline 2 L, acute exacerbation of heart failure reduced ejection fraction, hyponatremia, 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     Atrial fibrillation with RVR, resolved  -Amiodarone IV.  -Cardiology consultation, recommendations  appreciated.  -Status post cardioversion in the ED.  - Per cardiology commendations, continuing IV amiodarone for a total of 48-72 hours. Then can be changed to oral amiodarone therapy at 200 mg once per day.     Elevated troponins  -ACS not suspected, likely demand ischemia.    Acute exacerbation of heart failure reduced ejection fraction  Bilateral pleural effusions.   -Previous 2D echo on file from December 2024 with EF of 38%  -Daily weight, strict ins and outs  -Diuretics per nephrology  -Cardiology consulted, appreciate recommendations    Hypotension/shock  -Shock likely multifactorial given multiple chronic severe conditions, but could be related to sepsis  -Central line placed in ED.  -Received 2L IVF in ED.  - Will switch Levophed to Jr-Synephrine in the settings of A-fib with RVR    Colitis, suspected  -Ct showed: Thick-walled ascending colon and distal rectum are indeterminate. Correlate with signs or symptoms of colitis.  -Vancomycin and Zosyn out of an abundance of caution.  -Blood cultures.  -MRSA screen  -Unsure if hypotension has an element of septic shock, likely multifactorial given multiple chronic severe conditions.  -Urinalysis pending  - Check GI panel and C. difficile    COVID-19 infection  Chronic respiratory failure  -On his 2 L baseline.  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  -Avoid nephrotoxic drugs  -Diuretics per nephrology    Transaminitis  Hyperbilirubinemia  Cirrhosis  -Findings on imaging suggesting decompensated cirrhotic portal hypertension. Edematous gallbladder is likely secondary to 3rd spacing of fluid rather than cholecystitis.  -Right upper quadrant ultrasound ordered and pending  - Abnormal LFTs likely secondary to chronic cirrhosis.  - Monitor liver function  - Hold statin    Chronic leg wound  -Wound  care.    AAA  -Ascending aorta 4.6 cm diameter. Similar to prior.   -Recommend follow-up.     Chronic: Heart failure reduced ejection fraction, moderate AR, moderate MR, type 2 diabetes, cirrhosis, hypertension, prostate cancer, aortic stenosis status post AVR, HUSSAIN on CPAP, hyperlipidemia, history of diffuse large B-cell lymphoma of kidney, CKD, atrial fibrillation  -Subcutaneous insulin protocol.  -Continue other home medications.     -I have reviewed the copied text and it is accurate as of 4/8/2025   -Palliative care consulted for further discussions on goals of care, appreciate assistance.    DVT Prophylaxis: Eliquis  Code Status: DNR/DNI  Diet: Cardiac/renal  Discharge Plan: To be determined, needs several days    Deanne Bowles MD  04/08/25  08:34 EDT    Dictated utilizing Dragon dictation.

## 2025-04-08 NOTE — PROGRESS NOTES
Pharmacy Consult-Vancomycin Dosing    Blane Dietz is a  89 y.o. male receiving vancomycin therapy.     Indication: intra-abdominal infection, pneumonia,UTI  Consulting Provider: Dr. Kerley    Goal Trough: 15 - 20 mcg/ml    Current Antimicrobial Therapy  Anti-Infectives (From admission, onward)      Ordered     Dose/Rate Route Frequency Start Stop    04/08/25 0140  cefepime 2000 mg IVPB in 100 mL NS (VTB)        Ordering Provider: Kerley, Brian Joseph, DO    2,000 mg  over 4 Hours Intravenous Every 24 Hours 04/08/25 1900 04/14/25 1859    04/08/25 0148  vancomycin (dosing per levels)        Ordering Provider: Kerley, Brian Joseph, DO     Not Applicable Daily 04/08/25 0900 04/14/25 0859    04/08/25 0132  Pharmacy to dose vancomycin        Ordering Provider: Kerley, Brian Joseph, DO     Not Applicable Continuous PRN 04/08/25 0132 04/15/25 0131    04/07/25 1857  cefepime 2000 mg IVPB in 100 mL NS (VTB)        Ordering Provider: Shelton Hartley DO    2,000 mg  over 30 Minutes Intravenous Once 04/07/25 1913 04/07/25 1956    04/07/25 1857  vancomycin 1750 mg/500 mL 0.9% NS IVPB ([facility])        Ordering Provider: Shelton Hartley DO    20 mg/kg × 90.7 kg  over 105 Minutes Intravenous Once 04/07/25 1913 04/07/25 2137            Allergies  Allergies as of 04/07/2025 - Reviewed 04/07/2025   Allergen Reaction Noted    Ampicillin Anaphylaxis 07/06/2016    Medrol [methylprednisolone] Unknown - High Severity 07/21/2017    Myrbetriq [mirabegron] Unknown - High Severity 05/07/2018    Penicillins Anaphylaxis and Shortness Of Breath 06/13/2016    Bee venom Swelling 08/25/2016    Melatonin Hallucinations 06/14/2022       Labs    Results from last 7 days   Lab Units 04/07/25  1753   BUN mg/dL 95*   CREATININE mg/dL 2.83*       Results from last 7 days   Lab Units 04/07/25  1753   WBC 10*3/mm3 6.87       Evaluation of Dosing     Last Dose Received in the ED/Outside Facility: Vancomycin 1750 mg 4/7/25 @ 1923  Is Patient on Dialysis or  "Renal Replacement: N    Ht - 167.6 cm (66\")  Wt - 87 kg (191 lb 12.8 oz)    Estimated Creatinine Clearance: 19 mL/min (A) (by C-G formula based on SCr of 2.83 mg/dL (H)).    Intake & Output (last 3 days)         04/05 0701  04/06 0700 04/06 0701  04/07 0700 04/07 0701  04/08 0700    I.V. (mL/kg)   288.4 (3.3)    IV Piggyback   500    Total Intake(mL/kg)   788.4 (9.1)    Net   +788.4                   Microbiology and Radiology  Microbiology Results (last 10 days)       Procedure Component Value - Date/Time    Respiratory Panel PCR w/COVID-19(SARS-CoV-2) [facility] In-House, NP Swab in UTM/VTM, 2 HR TAT - Swab, Nasopharynx [[id number]]  (Abnormal) Collected: 04/07/25 1956    Lab Status: Final result Specimen: Swab from Nasopharynx Updated: 04/07/25 2047     ADENOVIRUS, PCR Not Detected     Coronavirus 229E Not Detected     Coronavirus HKU1 Not Detected     Coronavirus NL63 Not Detected     Coronavirus OC43 Not Detected     COVID19 Detected     Human Metapneumovirus Not Detected     Human Rhinovirus/Enterovirus Not Detected     Influenza A PCR Not Detected     Influenza B PCR Not Detected     Parainfluenza Virus 1 Not Detected     Parainfluenza Virus 2 Not Detected     Parainfluenza Virus 3 Not Detected     Parainfluenza Virus 4 Not Detected     RSV, PCR Not Detected     Bordetella pertussis pcr Not Detected     Bordetella parapertussis PCR Not Detected     Chlamydophila pneumoniae PCR Not Detected     Mycoplasma pneumo by PCR Not Detected    Narrative:      In the setting of a positive respiratory panel with a viral infection PLUS a negative procalcitonin without other underlying concern for bacterial infection, consider observing off antibiotics or discontinuation of antibiotics and continue supportive care. If the respiratory panel is positive for atypical bacterial infection (Bordetella pertussis, Chlamydophila pneumoniae, or Mycoplasma pneumoniae), consider antibiotic de-escalation to target atypical " bacterial infection.    Urine Culture - Urine, Urine, Clean Catch [[id number]] Collected: 04/06/25 1330    Lab Status: Final result Specimen: Urine, Clean Catch Updated: 04/07/25 1154     Urine Culture 50,000 CFU/mL Mixed Isssy Isolated    Narrative:      Specimen contains mixed organisms of questionable pathogenicity suggestive of contamination. If symptoms persist, suggest recollection.  Colonization of the urinary tract without infection is common. Treatment is discouraged unless the patient is symptomatic, pregnant, or undergoing an invasive urologic procedure.            Vancomycin Levels:                      Assessment/Plan    Pharmacy to dose vancomycin for intra-abdominal infection. Goal trough 15 - 20  mcg/mL.  Patient currently receiving vancomycin dosed per level.  Vancomycin 1750 mg iv given 4/7/25 @ 1923  Assess clearance by vancomycin random level on 4/8/25 @ 0600  Pharmacy will continue to monitor renal function, cultures and sensitivities, and clinical status to adjust regimen as necessary.    Thank you,  Raiza Abarca RPH.PharmD  04/08/25 01:50 EDT

## 2025-04-08 NOTE — PLAN OF CARE
Goal Outcome Evaluation:  Plan of Care Reviewed With: patient, child        Progress: improving  Outcome Evaluation: Patient had no complaints of pain this shift, able to void standing up, pressor titrated down. Tolerated food, refused all insulin, did have some increased glucose this shift.

## 2025-04-09 ENCOUNTER — APPOINTMENT (OUTPATIENT)
Dept: ULTRASOUND IMAGING | Facility: HOSPITAL | Age: 89
DRG: 177 | End: 2025-04-09
Payer: MEDICARE

## 2025-04-09 LAB
ALBUMIN SERPL-MCNC: 3.7 G/DL (ref 3.5–5.2)
ALBUMIN/GLOB SERPL: 1.4 G/DL
ALP SERPL-CCNC: 151 U/L (ref 39–117)
ALT SERPL W P-5'-P-CCNC: 72 U/L (ref 1–41)
ANION GAP SERPL CALCULATED.3IONS-SCNC: 17 MMOL/L (ref 5–15)
AST SERPL-CCNC: 67 U/L (ref 1–40)
BASOPHILS # BLD AUTO: 0.04 10*3/MM3 (ref 0–0.2)
BASOPHILS NFR BLD AUTO: 0.5 % (ref 0–1.5)
BILIRUB SERPL-MCNC: 1 MG/DL (ref 0–1.2)
BUN SERPL-MCNC: 92 MG/DL (ref 8–23)
BUN/CREAT SERPL: 34.8 (ref 7–25)
CALCIUM SPEC-SCNC: 7.5 MG/DL (ref 8.6–10.5)
CHLORIDE SERPL-SCNC: 99 MMOL/L (ref 98–107)
CO2 SERPL-SCNC: 20 MMOL/L (ref 22–29)
CREAT SERPL-MCNC: 2.64 MG/DL (ref 0.76–1.27)
DEPRECATED RDW RBC AUTO: 50.4 FL (ref 37–54)
EGFRCR SERPLBLD CKD-EPI 2021: 22.7 ML/MIN/1.73
EOSINOPHIL # BLD AUTO: 0.09 10*3/MM3 (ref 0–0.4)
EOSINOPHIL NFR BLD AUTO: 1 % (ref 0.3–6.2)
ERYTHROCYTE [DISTWIDTH] IN BLOOD BY AUTOMATED COUNT: 16.1 % (ref 12.3–15.4)
GLOBULIN UR ELPH-MCNC: 2.7 GM/DL
GLUCOSE BLDC GLUCOMTR-MCNC: 124 MG/DL (ref 70–130)
GLUCOSE BLDC GLUCOMTR-MCNC: 174 MG/DL (ref 70–130)
GLUCOSE BLDC GLUCOMTR-MCNC: 174 MG/DL (ref 70–130)
GLUCOSE BLDC GLUCOMTR-MCNC: 229 MG/DL (ref 70–130)
GLUCOSE SERPL-MCNC: 120 MG/DL (ref 65–99)
HCT VFR BLD AUTO: 36.4 % (ref 37.5–51)
HGB BLD-MCNC: 12.1 G/DL (ref 13–17.7)
IMM GRANULOCYTES # BLD AUTO: 0.08 10*3/MM3 (ref 0–0.05)
IMM GRANULOCYTES NFR BLD AUTO: 0.9 % (ref 0–0.5)
LYMPHOCYTES # BLD AUTO: 1.14 10*3/MM3 (ref 0.7–3.1)
LYMPHOCYTES NFR BLD AUTO: 13.2 % (ref 19.6–45.3)
MCH RBC QN AUTO: 29.7 PG (ref 26.6–33)
MCHC RBC AUTO-ENTMCNC: 33.2 G/DL (ref 31.5–35.7)
MCV RBC AUTO: 89.2 FL (ref 79–97)
MONOCYTES # BLD AUTO: 1.06 10*3/MM3 (ref 0.1–0.9)
MONOCYTES NFR BLD AUTO: 12.3 % (ref 5–12)
NEUTROPHILS NFR BLD AUTO: 6.24 10*3/MM3 (ref 1.7–7)
NEUTROPHILS NFR BLD AUTO: 72.1 % (ref 42.7–76)
NRBC BLD AUTO-RTO: 0 /100 WBC (ref 0–0.2)
PLATELET # BLD AUTO: 152 10*3/MM3 (ref 140–450)
PMV BLD AUTO: 10.5 FL (ref 6–12)
POTASSIUM SERPL-SCNC: 4.5 MMOL/L (ref 3.5–5.2)
PROT SERPL-MCNC: 6.4 G/DL (ref 6–8.5)
RBC # BLD AUTO: 4.08 10*6/MM3 (ref 4.14–5.8)
SODIUM SERPL-SCNC: 136 MMOL/L (ref 136–145)
VANCOMYCIN SERPL-MCNC: 17.9 MCG/ML (ref 5–40)
WBC NRBC COR # BLD AUTO: 8.65 10*3/MM3 (ref 3.4–10.8)

## 2025-04-09 PROCEDURE — 25010000002 VANCOMYCIN 1 G RECONSTITUTED SOLUTION 1 EACH VIAL: Performed by: FAMILY MEDICINE

## 2025-04-09 PROCEDURE — 99223 1ST HOSP IP/OBS HIGH 75: CPT | Performed by: PHYSICIAN ASSISTANT

## 2025-04-09 PROCEDURE — 76705 ECHO EXAM OF ABDOMEN: CPT

## 2025-04-09 PROCEDURE — 63710000001 INSULIN LISPRO (HUMAN) PER 5 UNITS: Performed by: STUDENT IN AN ORGANIZED HEALTH CARE EDUCATION/TRAINING PROGRAM

## 2025-04-09 PROCEDURE — 80053 COMPREHEN METABOLIC PANEL: CPT | Performed by: STUDENT IN AN ORGANIZED HEALTH CARE EDUCATION/TRAINING PROGRAM

## 2025-04-09 PROCEDURE — 85025 COMPLETE CBC W/AUTO DIFF WBC: CPT | Performed by: STUDENT IN AN ORGANIZED HEALTH CARE EDUCATION/TRAINING PROGRAM

## 2025-04-09 PROCEDURE — 80202 ASSAY OF VANCOMYCIN: CPT | Performed by: STUDENT IN AN ORGANIZED HEALTH CARE EDUCATION/TRAINING PROGRAM

## 2025-04-09 PROCEDURE — 99233 SBSQ HOSP IP/OBS HIGH 50: CPT | Performed by: FAMILY MEDICINE

## 2025-04-09 PROCEDURE — 25010000002 AMIODARONE IN DEXTROSE 5% 360-4.14 MG/200ML-% SOLUTION: Performed by: STUDENT IN AN ORGANIZED HEALTH CARE EDUCATION/TRAINING PROGRAM

## 2025-04-09 PROCEDURE — 82948 REAGENT STRIP/BLOOD GLUCOSE: CPT | Performed by: STUDENT IN AN ORGANIZED HEALTH CARE EDUCATION/TRAINING PROGRAM

## 2025-04-09 PROCEDURE — 25010000002 CEFEPIME PER 500 MG: Performed by: STUDENT IN AN ORGANIZED HEALTH CARE EDUCATION/TRAINING PROGRAM

## 2025-04-09 PROCEDURE — 25810000003 SODIUM CHLORIDE 0.9 % SOLUTION 250 ML FLEX CONT: Performed by: FAMILY MEDICINE

## 2025-04-09 PROCEDURE — 99497 ADVNCD CARE PLAN 30 MIN: CPT | Performed by: PHYSICIAN ASSISTANT

## 2025-04-09 PROCEDURE — 82948 REAGENT STRIP/BLOOD GLUCOSE: CPT

## 2025-04-09 RX ORDER — MIDODRINE HYDROCHLORIDE 5 MG/1
5 TABLET ORAL
Status: DISCONTINUED | OUTPATIENT
Start: 2025-04-09 | End: 2025-04-23 | Stop reason: HOSPADM

## 2025-04-09 RX ADMIN — AMIODARONE HYDROCHLORIDE 0.5 MG/MIN: 1.8 INJECTION, SOLUTION INTRAVENOUS at 00:57

## 2025-04-09 RX ADMIN — MUPIROCIN 1 APPLICATION: 20 OINTMENT TOPICAL at 20:38

## 2025-04-09 RX ADMIN — APIXABAN 2.5 MG: 2.5 TABLET, FILM COATED ORAL at 11:00

## 2025-04-09 RX ADMIN — INSULIN LISPRO 4 UNITS: 100 INJECTION, SOLUTION INTRAVENOUS; SUBCUTANEOUS at 20:37

## 2025-04-09 RX ADMIN — VANCOMYCIN HYDROCHLORIDE 1000 MG: 1 INJECTION, POWDER, LYOPHILIZED, FOR SOLUTION INTRAVENOUS at 11:00

## 2025-04-09 RX ADMIN — MUPIROCIN 1 APPLICATION: 20 OINTMENT TOPICAL at 11:00

## 2025-04-09 RX ADMIN — METOPROLOL SUCCINATE 25 MG: 25 TABLET, EXTENDED RELEASE ORAL at 11:00

## 2025-04-09 RX ADMIN — Medication 0.5 MCG/KG/MIN: at 11:06

## 2025-04-09 RX ADMIN — Medication 10 ML: at 20:38

## 2025-04-09 RX ADMIN — AMIODARONE HYDROCHLORIDE 0.5 MG/MIN: 1.8 INJECTION, SOLUTION INTRAVENOUS at 13:05

## 2025-04-09 RX ADMIN — APIXABAN 2.5 MG: 2.5 TABLET, FILM COATED ORAL at 20:39

## 2025-04-09 RX ADMIN — Medication 10 ML: at 11:00

## 2025-04-09 RX ADMIN — CEFEPIME 2000 MG: 2 INJECTION, POWDER, FOR SOLUTION INTRAVENOUS at 20:37

## 2025-04-09 NOTE — PLAN OF CARE
Goal Outcome Evaluation:  Plan of Care Reviewed With: patient           Outcome Evaluation: Pt tolerated C-pap all night.  Amiodarone drip on going.  HR at controlled rate 53.  Pt able to stand and rotate to bedside commode.  Denies pain.

## 2025-04-09 NOTE — PROGRESS NOTES
[facility]    Name:  Blane Dietz   Age:  89 y.o.  Sex:  male  :  1935  MRN:  [id number]   Visit Number:  [id number]  Admission Date:  2025  Date Of Service:  25  Primary Care Physician:  David Em MD     LOS: 2 days :    Chief Complaint:      Shortness of air, N/V/D     Subjective:    Patient was seen examined bedside today. Patient sitting up in the chair and appears comfortable with no distress.  He reports continued watery diarrhea.  Reports mild improvement of his shortness of breath but still has dry nonproductive cough.  He denies any pain or discomfort currently otherwise.  Mental status at baseline and oriented x 3.  Remains on IV pressor for blood pressure support.  Titrated down on his oxygen to 3 L nasal cannula.  Afebrile.    Hospital Course:    Blane Dietz is an 89-year-old man with past medical history of heart failure reduced ejection fraction, moderate AR, moderate MR, type 2 diabetes, cirrhosis, hypertension, prostate cancer, aortic stenosis status post AVR, HUSSAIN on CPAP, hyperlipidemia, history of diffuse large B-cell lymphoma of kidney, CKD, atrial fibrillation.  Presented to [facility] ED on 2025 with concern for several weeks of various degrees of diarrhea, shortness of air with cough.  Family reports the patient actually has been off of this medication for several weeks.  Denied any chest pain, abdominal pain.  Says that he does feel somewhat short of air.  Denied any fevers or chills.     ED summary: Afebrile, atrial fibrillation with RVR rate as high as 160 resolved with cardioversion, hypotension resolved with fluids and Levophed, vital signs stable on 2 L baseline.  EKG initially atrial fibrillation with RVR rate 161.  After resolution sinus rhythm with first-degree AV block, right bundle branch block.  Troponin monitoring 1, 41, ACS not suspected.  proBNP over 18,000.  Sodium 131, anion gap 13.5, BUN 95, creatinine  2.83, EGFR 20, blood glucose 246, calcium 8.0, magnesium 2.8, Phos 6.4, , ALT 77, bilirubin 1.3.  Lactate 2.7, procalcitonin 0.25, no leukocytosis, hemoglobin 12.2.  Blood cultures.  COVID-19 positive.  CT angio chest bilateral pleural effusions, no acute PE, 4.6 cm diameter dilated ascending aorta.  CT ab/pelvis findings suggesting decompensated cirrhotic portal hypertension, edematous gallbladder likely secondary to third spacing, thick-walled descending colon and distal rectum indeterminant.  He was provided IV amiodarone, vancomycin and cefepime, etomidate, Xylocaine, 2 L bolus.  Started on Levophed.  ED physician plans on placing central line.    Review of Systems:     All systems were reviewed and negative except as mentioned in subjective, assessment and plan.    Vital Signs:    Temp:  [97.7 °F (36.5 °C)-98.8 °F (37.1 °C)] 97.7 °F (36.5 °C)  Heart Rate:  [] 62  Resp:  [15-23] 15  BP: ()/(47-85) 85/49    Intake and output:    I/O last 3 completed shifts:  In: 1860.4 [P.O.:240; I.V.:1120.4; IV Piggyback:500]  Out: 575 [Urine:575]  No intake/output data recorded.    Physical Examination:    General Appearance:  Alert and cooperative.  Chronically ill-appearing.  No acute distress.   Head:  Atraumatic and normocephalic.   Eyes: Conjunctivae and sclerae normal, no icterus. No pallor.   Throat: No oral lesions, no thrush, oral mucosa moist.   Neck: Supple, trachea midline, no thyromegaly.   Lungs:   Breath sounds heard bilaterally equally.  No wheezing.  Bibasilar rhonchi heard.  Decreased air movement bilaterally.  No Pleural rub or bronchial breathing.  Unlabored.  On supplemental oxygen.   Heart:  Normal S1 and S2, no murmur, no gallop, no rub. No JVD.   Abdomen:   Normal bowel sounds, no masses, no organomegaly. Soft, nontender, nondistended, no rebound tenderness.   Extremities: Supple, no edema, no cyanosis, no clubbing.    Skin: No bleeding.  Superficial chronic leg wound, see photo   "  Neurologic: Alert and oriented x 3. No facial asymmetry. Moves all four limbs. No tremors.      Laboratory results:    Results from last 7 days   Lab Units 04/09/25  0506 04/08/25 0441 04/07/25  1753   SODIUM mmol/L 136 134* 131*   POTASSIUM mmol/L 4.5 4.8 5.0   CHLORIDE mmol/L 99 97* 94*   CO2 mmol/L 20.0* 17.3* 21.5*   BUN mg/dL 92* 94* 95*   CREATININE mg/dL 2.64* 2.74* 2.83*   CALCIUM mg/dL 7.5* 7.4* 8.0*   BILIRUBIN mg/dL 1.0 1.2 1.3*   ALK PHOS U/L 151* 163* 184*   ALT (SGPT) U/L 72* 82* 77*   AST (SGOT) U/L 67* 93* 88*   GLUCOSE mg/dL 120* 183* 146*     Results from last 7 days   Lab Units 04/09/25  0506 04/08/25 0441 04/07/25  1753   WBC 10*3/mm3 8.65 8.71 6.87   HEMOGLOBIN g/dL 12.1* 12.0* 12.2*   HEMATOCRIT % 36.4* 37.3* 37.1*   PLATELETS 10*3/mm3 152 180 137*         Results from last 7 days   Lab Units 04/07/25 1910 04/07/25  1753   HSTROP T ng/L 41* 101*     Results from last 7 days   Lab Units 04/07/25 1916 04/07/25 1910 04/06/25  1330   BLOODCX  No growth at 24 hours No growth at 24 hours  --    URINECX   --   --  50,000 CFU/mL Mixed Sissy Isolated     No results for input(s): \"PHART\", \"RTU5ASL\", \"PO2ART\", \"KIC4JST\", \"BASEEXCESS\" in the last 8760 hours.   I have reviewed the patient's laboratory results.    Radiology results:    US Gallbladder  Result Date: 4/9/2025  RIGHT UPPER QUADRANT ULTRASOUND  HISTORY: Abnormal CT abdomen pelvis, edematous gallbladder; U07.1-COVID-19; R57.9-Shock, unspecified; I48.91-Unspecified atrial fibrillation  COMPARISON: None.  PROCEDURE: Ultrasound images of the right upper quadrant were obtained.  FINDINGS:  The liver parenchyma is heterogeneous with mildly nodular contour and perihepatic ascites suggesting chronic liver disease such as cirrhosis. Recommend correlation with liver function test. Liver measures 12.8 cm in length. There is no focal abnormality. Portal vein measures 13 mm in diameter, within normal limits. Right pleural effusion noted. The " gallbladder is proper size with no wall thickening or pericholecystic fluid. There are  no gallstones. There is some dependent hypoechoic material consistent with sludge. No evidence of ductal dilatation is identified. Limited images of the pancreas are  obscured by bowel gas. Right kidney absent. [  ]      Impression: Nodular, heterogeneous liver with ascites suggesting cirrhosis. Recommend correlation with liver function test.  Right pleural effusion as seen on chest CT of 4/7/2025.  Gallbladder sludge but no gallstones identified.  This report was signed and finalized on 4/9/2025 9:47 AM by Di Ayon MD.      CT Abdomen Pelvis With Contrast  Result Date: 4/7/2025  FINAL REPORT TECHNIQUE: null CLINICAL HISTORY: Hypotension, shock COMPARISON: null FINDINGS: CT abdomen and pelvis with contrast Comparison: CT - CT ANGIOGRAM CHEST PULMONARY EMBOLISM - 4/7/25 20:55 EDT Findings: Large right and small left pleural effusions with associated atelectasis. Cardiomegaly. Relatively small lobulated liver. No bile duct dilatation. Main portal vein 1.7 cm diameter with recanalized umbilical vein collateral. 2 mm nonobstructing left inferior renal stone. Subcentimeter left mid renal cyst. Right nephrectomy. Edematous gallbladder. No bile duct dilatation. Abdominal solid organs otherwise unremarkable. There is wall thickening of the ascending colon and distal rectum with no inflammatory change. No bowel obstruction, pneumoperitoneum, or pneumatosis. Minimal upper abdominal ascites. Pelvic contents unremarkable. Normal appendix. Prostatectomy. Urinary bladder is partially decompressed and mildly thick-walled as a result. Small right superior urinary bladder diverticulum. Grade 1 chronic dyspneic L5-S1 spondylolisthesis. Moderate to severe lumbar degenerative spondylosis. No acute fracture. Generalized edema, especially in the subcutaneous tissues.     Impression: IMPRESSION: 1. Findings suggesting decompensated cirrhotic portal  hypertension. 2. Edematous gallbladder is likely secondary to 3rd spacing of fluid rather than cholecystitis. 3. Thick-walled ascending colon and distal rectum are indeterminate. Correlate with any signs or symptoms of colitis. 4. Additional findings as above. Authenticated and Electronically Signed by Ventura Obando MD on 04/07/2025 09:55:20 PM    CT Angiogram Chest Pulmonary Embolism  Result Date: 4/7/2025  FINAL REPORT TECHNIQUE: null CLINICAL HISTORY: Hypotension, shock COMPARISON: null FINDINGS: CT angiography chest with contrast. 3D Postprocessing. Comparison: CT - CT ABDOMEN PELVIS W CONTRAST - 4/7/25 20:55 EDT CT/SR - CT ANGIOGRAM CHEST PULMONARY EMBOLISM - 12/21/24 19:34 EST Findings: Cardiomegaly. No pericardial effusion. RV/LV ratio normal. Ascending aorta 4.6 cm diameter. Lumen is not opacified. Similar to prior. Enlarged pulmonary artery. This can be seen with pulmonary artery hypertension. The visualized thyroid and mediastinum are unremarkable. Lower lobe pulmonary arteries are not opacified. Likely due to poor cardiac output. Large right and small left pleural effusions. Dependent atelectasis both lungs, more prominent on the right. No pneumothorax. Abdominal findings are discussed on the same day comparison. No acute fractures.     Impression: IMPRESSION: 1. Bilateral pleural effusions. Given the abnormal findings, this could be secondary to decompensated cirrhotic portal hypertension. 2. No acute pulmonary embolus within the limitations of the exam. 3. 4.6 cm diameter dilated ascending aorta. Recommend follow-up as needed. 4. Additional findings as above. Authenticated and Electronically Signed by Ventura Obando MD on 04/07/2025 09:54:16 PM    XR Chest 1 View  Result Date: 4/7/2025  FINAL REPORT TECHNIQUE: null CLINICAL HISTORY: SOA COMPARISON: null FINDINGS: Single view of the chest. COMPARISON: None FINDINGS: Status post sternotomy. Cardiomegaly. Atherosclerotic thoracic aorta. Mild  prominence of the central pulmonary vasculature. Blunting of the right costophrenic angle. No pneumothorax. No consolidation. No fracture identified. Mild spondylosis.     Impression: IMPRESSION: 1. Small right pleural effusion. 2. Cardiomegaly with likely mild pulmonary vascular congestion. Authenticated and Electronically Signed by Jessee Scott MD on 04/07/2025 07:09:41 PM    I have reviewed the patient's radiology reports.    Medication Review:     I have reviewed the patient's active and prn medications.     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  Transaminitis  Hyperbilirubinemia  Chronic leg wound  AAA     Chronic: Heart failure reduced ejection fraction, moderate AR, moderate MR, type 2 diabetes, cirrhosis, hypertension, prostate cancer, aortic stenosis status post AVR, HUSSAIN on CPAP, hyperlipidemia, history of diffuse large B-cell lymphoma of kidney, CKD, atrial fibrillation    Plan:    Inpatient ICU admission 4/7/2025 with atrial fibrillation with RVR status post cardioversion in ED, hypotension likely multifactorial requiring Levophed, elevated troponins ACS not suspected likely demand ischemia, COVID-19 infection on his baseline 2 L, acute exacerbation of heart failure reduced ejection fraction, hyponatremia, 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     Atrial fibrillation with RVR, resolved  -Amiodarone IV.  -Cardiology consultation, recommendations appreciated.  -Status post cardioversion in the ED.  - Per cardiology commendations, continuing IV amiodarone for a total of 48-72 hours. Then can be changed to oral amiodarone therapy at 200 mg once per day.     Elevated troponins  -ACS not suspected, likely demand ischemia.    Acute exacerbation of heart failure  reduced ejection fraction  Bilateral pleural effusions.   -Previous 2D echo on file from December 2024 with EF of 38%  -Daily weight, strict ins and outs  -Diuretics per nephrology  -Cardiology consulted, appreciate recommendations    Hypotension/shock  -Shock likely multifactorial given multiple chronic severe conditions, but could be related to sepsis  -Central line placed in ED.  -Received 2L IVF in ED.  - Will switch Levophed to Jr-Synephrine in the settings of A-fib with RVR  - Will add midodrine hoping we can titrate his pressors off    Colitis, suspected  -Ct showed: Thick-walled ascending colon and distal rectum are indeterminate. Correlate with signs or symptoms of colitis.  - Initially started vancomycin and Zosyn on admission out of an abundance of caution.  -Blood cultures remains negative x 24 hours  -Unsure if hypotension has an element of septic shock, likely multifactorial given multiple chronic severe conditions.  -GI panel and C. Difficile both negative  - MRSA screen negative, discontinue vancomycin, continue Zosyn    COVID-19 infection  Chronic respiratory failure  -On his 2 L baseline.  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  -Avoid nephrotoxic drugs  -Diuretics per nephrology    Transaminitis  Hyperbilirubinemia  Cirrhosis  -Findings on imaging suggesting decompensated cirrhotic portal hypertension. Edematous gallbladder is likely secondary to 3rd spacing of fluid rather than cholecystitis.  -Right upper quadrant ultrasound ordered and pending  - Abnormal LFTs likely secondary to chronic cirrhosis.  - Monitor liver function  - Hold statin    Chronic leg wound  -Wound care.    AAA  -Ascending aorta 4.6 cm diameter. Similar to prior.   -Recommend follow-up.     Chronic: Heart failure reduced ejection fraction, moderate AR,  moderate MR, type 2 diabetes, cirrhosis, hypertension, prostate cancer, aortic stenosis status post AVR, HUSSAIN on CPAP, hyperlipidemia, history of diffuse large B-cell lymphoma of kidney, CKD, atrial fibrillation  -Subcutaneous insulin protocol.  -Continue other home medications.     -I have reviewed the copied text and it is accurate as of 4/9/2025     -Palliative care consulted for further discussions on goals of care, patient does not want any escalation of care, he is agreeable for hospice at this point but still undecided if his going to do home with hospice versus hospice care center.    DVT Prophylaxis: Eliquis  Code Status: DNR/DNI  Diet: Cardiac/renal  Discharge Plan: To be determined, needs several days    Deanne Bowles MD  04/09/25  10:13 EDT    Dictated utilizing Dragon dictation.

## 2025-04-09 NOTE — PROGRESS NOTES
[facility] Progress Note  [facility]. [place]        Patient Name: Blane Dietz  : 1935  MRN: [id number]   LOS: 2 days    Patient Care Team:  David Em MD as PCP - General (Internal Medicine)  Ha Toussaint MD as Consulting Physician (Cardiology)  Shannan Ramon MD as Consulting Physician (Hematology and Oncology)  Xavier Boston MD as Consulting Physician (Endocrinology)  Brionna Weinstein DO as Surgeon (General Surgery)  Sophia Ferreira PA as Physician Assistant (Endocrinology)  Abbey Reyez APRN as Nurse Practitioner (Nurse Practitioner)    Chief Complaint:    Chief Complaint   Patient presents with    Shortness of Breath     Primary Care Physician:  David Em MD  Date of admission: 2025    Subjective     Interval History:   Follow-up acute on 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   Patient is awake alert and interactive denies having any chest pain shortness of breath, complaining of some rectal itching.  Ongoing discussion with palliative care.  Still off-and-on has been on pressors.  Events noted from last 24 hours.  I reviewed the chart and other providers notes, labs and procedures done since my last note.    Review of Systems:   As noted above.    Objective     Vitals:   Temp:  [97.7 °F (36.5 °C)-98.8 °F (37.1 °C)] 97.7 °F (36.5 °C)  Heart Rate:  [] 62  Resp:  [15-23] 15  BP: ()/(47-85) 85/49  Flow (L/min) (Oxygen Therapy):  [3] 3    Intake/Output Summary (Last 24 hours) at 2025 1034  Last data filed at 2025 0600  Gross per 24 hour   Intake 905 ml   Output 575 ml   Net 330 ml       Physical Exam:    General Appearance: alert, oriented x 3, no acute distress   Skin: warm and dry  HEENT: oral mucosa normal, nonicteric sclera  Neck: supple, no JVD  Lungs: CTA  Heart: IRRR,   Abdomen: obese, soft, nontender, non distended and positive bowel sounds.  : no palpable bladder  Extremities: Trace  edema, no cyanosis or clubbing  Neuro: normal speech and mental status     Scheduled Meds:     Current Facility-Administered Medications   Medication Dose Route Frequency Provider Last Rate Last Admin    amiodarone 360 mg in 200 mL D5W infusion  0.5 mg/min Intravenous Continuous Kerley, Brian Joseph, DO 16.67 mL/hr at 04/09/25 0057 0.5 mg/min at 04/09/25 0057    apixaban (ELIQUIS) tablet 2.5 mg  2.5 mg Oral Q12H Kerley, Brian Joseph, DO   2.5 mg at 04/08/25 2112    cefepime 2000 mg IVPB in 100 mL NS (VTB)  2,000 mg Intravenous Q24H Kerley, Brian Joseph, DO   2,000 mg at 04/08/25 1951    dextrose (D50W) (25 g/50 mL) IV injection 25 g  25 g Intravenous Q15 Min PRN Kerley, Brian Joseph, DO        dextrose (GLUTOSE) oral gel 15 g  15 g Oral Q15 Min PRN Kerley, Brian Joseph, DO        glucagon (GLUCAGEN) injection 1 mg  1 mg Intramuscular Q15 Min PRN Kerley, Brian Joseph, DO        Insulin Lispro (humaLOG) injection 2-9 Units  2-9 Units Subcutaneous 4x Daily AC & at Bedtime Kerley, Brian Joseph, DO   2 Units at 04/08/25 2111    metoprolol succinate XL (TOPROL-XL) 24 hr tablet 25 mg  25 mg Oral Daily Kerley, Brian Joseph, DO   25 mg at 04/08/25 1039    mupirocin (BACTROBAN) 2 % nasal ointment 1 Application  1 Application Each Nare BID Deanne Bowles MD   1 Application at 04/08/25 2112    nitroglycerin (NITROSTAT) SL tablet 0.4 mg  0.4 mg Sublingual Q5 Min PRN Kerley, Brian Joseph, DO        ondansetron (ZOFRAN) injection 4 mg  4 mg Intravenous Q6H PRN Kerley, Brian Joseph, DO        Pharmacy Consult Cefepime   Not Applicable Continuous PRN Kerley, Brian Joseph, DO        Pharmacy to dose vancomycin   Not Applicable Continuous PRN Kerley, Brian Joseph, DO        phenylephrine (JOSEPH-SYNEPHRINE) 50 mg in 250 mL NS infusion  0.5-3 mcg/kg/min Intravenous Titrated Deanne Bowles MD   Stopped at 04/09/25 0830    [Held by provider] pravastatin (PRAVACHOL) tablet 40 mg  40 mg Oral Daily Kerley, Brian Joseph, DO   40 mg at  04/08/25 1039    sodium chloride 0.9 % flush 10 mL  10 mL Intravenous Q12H Kerley, Brian Joseph,    10 mL at 04/08/25 2112    sodium chloride 0.9 % flush 10 mL  10 mL Intravenous PRN Kerley, Brian Joseph,         sodium chloride 0.9 % infusion 40 mL  40 mL Intravenous PRN Kerley, Brian Joseph, DO        vancomycin (dosing per levels)   Not Applicable Daily Kerley, Brian Joseph,         vancomycin (VANCOCIN) 1,000 mg in sodium chloride 0.9 % 250 mL IVPB-VTB  1,000 mg Intravenous Once Deanne Bowles MD           apixaban, 2.5 mg, Oral, Q12H  cefepime, 2,000 mg, Intravenous, Q24H  insulin lispro, 2-9 Units, Subcutaneous, 4x Daily AC & at Bedtime  metoprolol succinate XL, 25 mg, Oral, Daily  mupirocin, 1 Application, Each Nare, BID  [Held by provider] pravastatin, 40 mg, Oral, Daily  sodium chloride, 10 mL, Intravenous, Q12H  vancomycin (dosing per levels), , Not Applicable, Daily  vancomycin, 1,000 mg, Intravenous, Once        IV Meds:   amiodarone, 0.5 mg/min, Last Rate: 0.5 mg/min (04/09/25 0057)  Pharmacy Consult,   Pharmacy to dose vancomycin,   phenylephrine, 0.5-3 mcg/kg/min, Last Rate: Stopped (04/09/25 0830)        Results Reviewed:   I have personally reviewed the results from the time of this admission to 4/9/2025 10:34 EDT     Results from last 7 days   Lab Units 04/09/25  0506 04/08/25  0441 04/07/25  1753   SODIUM mmol/L 136 134* 131*   POTASSIUM mmol/L 4.5 4.8 5.0   CHLORIDE mmol/L 99 97* 94*   CO2 mmol/L 20.0* 17.3* 21.5*   BUN mg/dL 92* 94* 95*   CREATININE mg/dL 2.64* 2.74* 2.83*   CALCIUM mg/dL 7.5* 7.4* 8.0*   BILIRUBIN mg/dL 1.0 1.2 1.3*   ALK PHOS U/L 151* 163* 184*   ALT (SGPT) U/L 72* 82* 77*   AST (SGOT) U/L 67* 93* 88*   GLUCOSE mg/dL 120* 183* 146*       Estimated Creatinine Clearance: 20.5 mL/min (A) (by C-G formula based on SCr of 2.64 mg/dL (H)).    Results from last 7 days   Lab Units 04/07/25  1753   MAGNESIUM mg/dL 2.8*   PHOSPHORUS mg/dL 6.4*             Results from last 7 days  "  Lab Units 04/09/25  0506 04/08/25  0441 04/07/25  1753   WBC 10*3/mm3 8.65 8.71 6.87   HEMOGLOBIN g/dL 12.1* 12.0* 12.2*   PLATELETS 10*3/mm3 152 180 137*             Brief Urine Lab Results  (Last result in the past 365 days)        Color   Clarity   Blood   Leuk Est   Nitrite   Protein   CREAT   Urine HCG        04/08/25 1823 Yellow   Clear   Negative   Small (1+)   Negative   30 mg/dL (1+)                   No results found for: \"UTPCR\"    Imaging Results (Last 24 Hours)       Procedure Component Value Units Date/Time    US Gallbladder [[id number]] Collected: 04/09/25 0943     Updated: 04/09/25 0949    Narrative:      RIGHT UPPER QUADRANT ULTRASOUND     HISTORY: Abnormal CT abdomen pelvis, edematous gallbladder;  U07.1-COVID-19; R57.9-Shock, unspecified; I48.91-Unspecified atrial  fibrillation     COMPARISON: None.     PROCEDURE: Ultrasound images of the right upper quadrant were obtained.     FINDINGS:  The liver parenchyma is heterogeneous with mildly nodular  contour and perihepatic ascites suggesting chronic liver disease such as  cirrhosis. Recommend correlation with liver function test. Liver  measures 12.8 cm in length. There is no focal abnormality. Portal vein  measures 13 mm in diameter, within normal limits. Right pleural effusion  noted. The gallbladder is proper size with no wall thickening or  pericholecystic fluid. There are  no gallstones. There is some dependent  hypoechoic material consistent with sludge. No evidence of ductal  dilatation is identified. Limited images of the pancreas are  obscured  by bowel gas. Right kidney absent. [  ]       Impression:      Nodular, heterogeneous liver with ascites suggesting  cirrhosis. Recommend correlation with liver function test.     Right pleural effusion as seen on chest CT of 4/7/2025.     Gallbladder sludge but no gallstones identified.      This report was signed and finalized on 4/9/2025 9:47 AM by Di Ayon MD.                   Assessment / " Plan     ASSESSMENT:    COVID-19    Acute kidney injury: Patient does have significant worsening of renal function from his baseline likely hemodynamically mediated as well as cardiorenal syndrome.  Continue to optimize medications.  He does not have any significant electrolyte abnormalities bicarb is stable as well.  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  Valvular heart disease: Continue to optimize medications.  Coronary artery disease: Not a candidate for any aggressive cardiac workup, cardiac evaluation is in progress.  Congestive heart failure: Will need to optimize his volume status will adjust diuretics as needed.  Atrial fibrillation: Currently on amiodarone with some improvement in atrial fibrillation.  Type 2 diabetes: Continue with the sliding scale  Peripheral vascular disease: Continue to optimize medications no intervention planned.      PLAN:  Renal function slightly better, blood pressure is on the low side often requiring pressors.  Hold off giving diuretics today will need to be reassessed again in the morning.  Details were discussed with the patient no family in the room.    Details were also discussed with the hospitalist service and or other providers as needed.  Palliative care team will have discussion with the patient and family.  Continue with rest of the current treatment plan, and monitor with surveillance labs, adjust medication doses to the eGFR.  Further recommendations will depend on clinical course of the patient during the current hospitalization.   I have reviewed the copied text to this note, it was edited and the changes made as needed.  It is accurate to the point, when the note was signed today.     Thank you for involving us in the care of Blane Dietz.  Please feel free to call with any questions.    Andriy Foote MD,  FASN  04/09/25  10:34 EDT    [facility]  [phone]      Part of this note may be an electronic transcription/translation of spoken language to printed text using the Dragon Dictation System.

## 2025-04-09 NOTE — PLAN OF CARE
Goal Outcome Evaluation:  Plan of Care Reviewed With: patient, child        Progress: improving  Outcome Evaluation: Pt slept most of the shift, had multiple large BMs, stated he did not feel well today and just wanted to sleep. No complaints of pain.

## 2025-04-09 NOTE — PLAN OF CARE
"Goal Outcome Evaluation:     Palliative Care Team consult received for Goals of Care Discussion/ACP    Pt is currently COSE STATUS of No CPR, Limited Support--No Intubation.    He does have a Special POA with  Decsision authority (2003) in the EMR    Pt admitted via ED from [facility] with   SOA, Nausea/Vomiting/Diarrhea.   Pt was noted as being in A.Fib with RVR rates as high as 160 bpm resolved with cardioversion.   Respiratory panel revealed COVID 19 positive.     Admission Assessment:  Atrial Fibrillation with RVR, Hypotension, Colitis suspected, UTI, Elevated troponins, COVID 19 infection, Acuter exacerbation of HF with reduced EF, Hyponatremai, GERALD on CKD, Transaminitis, Hyperbilirubinemia, Chronic leg wound , AAA.    (See Inpatient Palliative Care Consult per Jimena VILLA)    Palliative Care Team --Jimena VILLA and Marie Finch RN visited pt after speaking with pt's primary nurse, Jaqui CARRILOL.   Pt had just been on the BSC and returned to bed with assistance.  He was requesting his CPAP but was willing to meet with us. He did remember the PC team visiting him in the past.    When asked, pt admits to using his CPAP frequently at home, has had SOA  and some wt loss.     Jimena reviewed with pt why he was admitted to the hospital citing pt having \"organs that are sick\".  He is aware of his functional decline.  He still enjoys playing \"Bridge\" and some limited outings. He reported finding some outings too exhausting.   She reviewed with his whether he is at the point when coming back and forth to the hospital is something that he is wanting to avoid and to focus on comfort.  She informed him, it that would be his wishes then Hospice would be an option.    Jimena explained Hospice services to pt  and some Assisted living facilities allow Hospice to follow them and this is something we can check on.   He responded wanting to speak with his daughter , Susana.    PC will continue to " follow.    Jimena contacted pt's daughter to provide update on pt's status as well as conversation re: Hospice services.   She voice interest in more information re: LTC and Hospice.   , Gina CARRILLO, notified.

## 2025-04-09 NOTE — PROGRESS NOTES
Pharmacy Consult-Vancomycin Dosing  Blane Dietz is a  89 y.o. male receiving vancomycin therapy.     Indication: Intra-abdominal infection, Pneumonia, UTI  Consulting Provider: Dr. Kerley    Goal Trough: 15 to 20 mcg/mL    Current Antimicrobial Therapy  Anti-Infectives (From admission, onward)      Ordered     Dose/Rate Route Frequency Start Stop    04/08/25 0140  cefepime 2000 mg IVPB in 100 mL NS (VTB)        Ordering Provider: Kerley, Brian Joseph, DO    2,000 mg  over 4 Hours Intravenous Every 24 Hours 04/08/25 1900 04/14/25 1859    04/08/25 0148  vancomycin (dosing per levels)        Ordering Provider: Kerley, Brian Joseph, DO     Not Applicable Daily 04/08/25 0900 04/14/25 0859    04/08/25 0641  vancomycin (VANCOCIN) 1,000 mg in sodium chloride 0.9 % 250 mL IVPB-VTB        Ordering Provider: Kerley, Brian Joseph, DO    1,000 mg  250 mL/hr over 60 Minutes Intravenous Once 04/08/25 0730 04/08/25 1141    04/08/25 0132  Pharmacy to dose vancomycin        Ordering Provider: Kerley, Brian Joseph,      Not Applicable Continuous PRN 04/08/25 0132 04/15/25 0131    04/07/25 1857  cefepime 2000 mg IVPB in 100 mL NS (VTB)        Ordering Provider: Shelton Hartley DO    2,000 mg  over 30 Minutes Intravenous Once 04/07/25 1913 04/07/25 1956    04/07/25 1857  vancomycin 1750 mg/500 mL 0.9% NS IVPB ([facility])        Ordering Provider: Shelton Hartley DO    20 mg/kg × 90.7 kg  over 105 Minutes Intravenous Once 04/07/25 1913 04/07/25 2137            Allergies  Allergies as of 04/07/2025 - Reviewed 04/07/2025   Allergen Reaction Noted    Ampicillin Anaphylaxis 07/06/2016    Medrol [methylprednisolone] Unknown - High Severity 07/21/2017    Myrbetriq [mirabegron] Unknown - High Severity 05/07/2018    Penicillins Anaphylaxis and Shortness Of Breath 06/13/2016    Bee venom Swelling 08/25/2016    Melatonin Hallucinations 06/14/2022       Labs    Results from last 7 days   Lab Units 04/09/25  0506 04/08/25  0441 04/07/25  1753   BUN  "mg/dL 92* 94* 95*   CREATININE mg/dL 2.64* 2.74* 2.83*       Results from last 7 days   Lab Units 04/09/25  0506 04/08/25  0441 04/07/25  1753   WBC 10*3/mm3 8.65 8.71 6.87       Evaluation of Dosing     Last Dose Received in the ED/Outside Facility: Yes  Is Patient on Dialysis or Renal Replacement: No    Ht - 167.6 cm (66\")  Wt - 87.9 kg (193 lb 12.6 oz)    Estimated Creatinine Clearance: 20.5 mL/min (A) (by C-G formula based on SCr of 2.64 mg/dL (H)).    Intake & Output (last 3 days)         04/05 0701 04/06 0700 04/06 0701 04/07 0700 04/07 0701 04/08 0700    I.V. (mL/kg)   455.4 (5.2)    IV Piggyback   500    Total Intake(mL/kg)   955.4 (11)    Net   +955.4                   Microbiology and Radiology  Microbiology Results (last 10 days)       Procedure Component Value - Date/Time    MRSA Screen, PCR (Inpatient) - Swab, Nares [[id number]]  (Normal) Collected: 04/08/25 1255    Lab Status: Final result Specimen: Swab from Nares Updated: 04/08/25 2031     MRSA PCR No MRSA Detected    Narrative:      The negative predictive value of this diagnostic test is high and should only be used to consider de-escalating anti-MRSA therapy. A positive result may indicate colonization with MRSA and must be correlated clinically.    Gastrointestinal Panel, PCR - Stool, Per Rectum [[id number]]  (Normal) Collected: 04/08/25 1255    Lab Status: Final result Specimen: Stool from Per Rectum Updated: 04/08/25 1447     Campylobacter Not Detected     Plesiomonas shigelloides Not Detected     Salmonella Not Detected     Vibrio Not Detected     Vibrio cholerae Not Detected     Yersinia enterocolitica Not Detected     Enteroaggregative E. coli (EAEC) Not Detected     Enteropathogenic E. coli (EPEC) Not Detected     Enterotoxigenic E. coli (ETEC) lt/st Not Detected     Shiga-like toxin-producing E. coli (STEC) stx1/stx2 Not Detected     Shigella/Enteroinvasive E. coli (EIEC) Not Detected     Cryptosporidium Not Detected     Cyclospora " cayetanensis Not Detected     Entamoeba histolytica Not Detected     Giardia lamblia Not Detected     Adenovirus F40/41 Not Detected     Astrovirus Not Detected     Norovirus GI/GII Not Detected     Rotavirus A Not Detected     Sapovirus (I, II, IV or V) Not Detected    Clostridioides difficile Toxin - Stool, Per Rectum [[id number]]  (Normal) Collected: 04/08/25 1255    Lab Status: Final result Specimen: Stool from Per Rectum Updated: 04/08/25 1447    Narrative:      The following orders were created for panel order Clostridioides difficile Toxin - Stool, Per Rectum.  Procedure                               Abnormality         Status                     ---------                               -----------         ------                     Clostridioides difficile...[[id number]]  Normal              Final result                 Please view results for these tests on the individual orders.    Clostridioides difficile EIA - Stool, Per Rectum [[id number]]  (Normal) Collected: 04/08/25 1255    Lab Status: Final result Specimen: Stool from Per Rectum Updated: 04/08/25 1447     C Diff GDH Ag Negative     C.diff Toxin Ag Negative    Narrative:      The result indicates the absence of toxigenic C.difficile from stool specimen.    Respiratory Panel PCR w/COVID-19(SARS-CoV-2) [facility]ARE[facility] In-House, NP Swab in UTM/VTM, 2 HR TAT - Swab, Nasopharynx [[id number]]  (Abnormal) Collected: 04/07/25 1956    Lab Status: Final result Specimen: Swab from Nasopharynx Updated: 04/07/25 2047     ADENOVIRUS, PCR Not Detected     Coronavirus 229E Not Detected     Coronavirus HKU1 Not Detected     Coronavirus NL63 Not Detected     Coronavirus OC43 Not Detected     COVID19 Detected     Human Metapneumovirus Not Detected     Human Rhinovirus/Enterovirus Not Detected     Influenza A PCR Not Detected     Influenza B PCR Not Detected     Parainfluenza Virus 1 Not Detected     Parainfluenza Virus 2 Not Detected     Parainfluenza Virus 3 Not  Detected     Parainfluenza Virus 4 Not Detected     RSV, PCR Not Detected     Bordetella pertussis pcr Not Detected     Bordetella parapertussis PCR Not Detected     Chlamydophila pneumoniae PCR Not Detected     Mycoplasma pneumo by PCR Not Detected    Narrative:      In the setting of a positive respiratory panel with a viral infection PLUS a negative procalcitonin without other underlying concern for bacterial infection, consider observing off antibiotics or discontinuation of antibiotics and continue supportive care. If the respiratory panel is positive for atypical bacterial infection (Bordetella pertussis, Chlamydophila pneumoniae, or Mycoplasma pneumoniae), consider antibiotic de-escalation to target atypical bacterial infection.    Blood Culture - Blood, Hand, Left [[id number]]  (Normal) Collected: 04/07/25 1916    Lab Status: Preliminary result Specimen: Blood from Hand, Left Updated: 04/08/25 1931     Blood Culture No growth at 24 hours    Blood Culture - Blood, Hand, Right [[id number]]  (Normal) Collected: 04/07/25 1910    Lab Status: Preliminary result Specimen: Blood from Hand, Right Updated: 04/08/25 1931     Blood Culture No growth at 24 hours    Urine Culture - Urine, Urine, Clean Catch [[id number]] Collected: 04/06/25 1330    Lab Status: Final result Specimen: Urine, Clean Catch Updated: 04/07/25 1154     Urine Culture 50,000 CFU/mL Mixed Sissy Isolated    Narrative:      Specimen contains mixed organisms of questionable pathogenicity suggestive of contamination. If symptoms persist, suggest recollection.  Colonization of the urinary tract without infection is common. Treatment is discouraged unless the patient is symptomatic, pregnant, or undergoing an invasive urologic procedure.            Vancomycin Levels:    Results from last 7 days   Lab Units 04/09/25  0506 04/08/25  0441   VANCOMYCIN RM mcg/mL 17.90 16.70                   Assessment/Plan    Pharmacy to dose vancomycin for intra-abdominal  infection, UTI, pneumonia. Goal trough 15 to 20 mcg/mL.  Patient currently receiving vancomycin dosed per level due to GERALD. Will order vancomycin 1000 mg for one dose.  Assess clearance by vancomycin random level on 4/10 @ 0600  Pharmacy will continue to monitor renal function, cultures and sensitivities, and clinical status to adjust regimen as necessary.    Thank you for the consult,    Mary Greene, PharmD  04/09/25 05:52 EDT

## 2025-04-09 NOTE — CASE MANAGEMENT/SOCIAL WORK
Case Management/Social Work    Patient Name:  Blane Dietz  YOB: 1935  MRN: [id number]  Admit Date:  4/7/2025        15:52 EDT   Discharge Plan       Row Name 04/09/25 7410       Plan    Plan Pt/daughter ( Susana) are considering comfort care. Pt probably unable to return to [facility], [facility] has waiting list for LTC. Dcp tbd by clinical course, LTC or CCC.      Row Name 04/09/25 1306       Plan    Plan Pt feeling too poor to work with PT today. He plans to return to [facility], might need STR prior, prefers [facility].                        Electronically signed by:  Gina Molina RN  04/09/25 15:52 EDT

## 2025-04-09 NOTE — CASE MANAGEMENT/SOCIAL WORK
Case Management/Social Work    Patient Name:  Blane Dietz  YOB: 1935  MRN: [id number]  Admit Date:  4/7/2025        13:07 EDT   Discharge Plan       Row Name 04/09/25 1306       Plan    Plan Pt feeling too poor to work with PT today. He plans to return to [facility], might need STR prior, prefers [facility].                        Electronically signed by:  Gina Molina RN  04/09/25 13:07 EDT

## 2025-04-09 NOTE — CONSULTS
[facility]    INPATIENT PALLIATIVE CARE CONSULT      Name:  Blane Dietz   Age:  89 y.o.  Sex:  male  :  1935  MRN:  [id number]   Visit Number:  [id number]  Date of Service:  25  Date of Admission:  2025  Primary Care Physician:  David Em MD    Consulting Physician:  Dr. Bowles    Reason For Consult:  Goals of care discussions , Support during serious illness, and Hospice information     History of Present Illness:  Blane Dietz is a 89 y.o. male with past medical history of B cell lymphoma status post nephrectomy, prostate cancer status post prostatectomy, HFrEF, valvular heart disease status post porcine valve, hypertension, hyperlipidemia, type 2 diabetes, sleep apnea, history of pulmonary embolus, cirrhosis.  He is known to the palliative care team from initial consultation on 2/3/2025 where he was admitted secondary to exacerbation of chronic heart failure.  Patient presented to [facility] on 2025 secondary to shortness of breath, cough, loose stools.  Symptoms present and progressive for several weeks.  Upon ED evaluation patient afebrile, noted to have atrial fibrillation with RVR with rate 160s.  He ultimately underwent cardioversion.  Patient hypotensive, provided IV fluid bolus, however ultimately required initiation of pressor support with Levophed.  Following cardioversion EKG noted first-degree AV block with right bundle branch block.  proBNP greater than 18,000.  Sodium 131, BUN 95, creatinine 2.83, glucose 246, calcium 8.0, magnesium 2.8, , ALT 77, lactate 2.7, procalcitonin 0.25.  No leukocytosis, hemoglobin 12.2.  Flu negative, however COVID-19 positive.  Imaging with CTA revealed bilateral pleural effusion, 4.6 cm dilated ascending aorta, no pulmonary embolism.  CT A/P suggestive of cirrhotic portal hypertension, edematous gallbladder likely secondary to third spacing, thick walled descending colon and distal rectum  indeterminate.  He was initiated on IV amiodarone, antibiotics, admitted to the primary medicine service requiring ICU level of admission.  Nephrology followed in consultation, ultimately felt worsening renal function likely secondary to hypotension as well as cardiorenal syndrome, adjusting diuretics, however noting may be candidate for palliative care consultation.  Cardiology consulted as well, recommending continued anticoagulation with Eliquis, continuing IV amiodarone with transition to oral, avoiding Levophed rather recommending vasopressin and or Jr-Synephrine.  Unfortunately patient has not been able to titrate off pressor support.      Palliative care consulted to further review goals of care in the setting of complex medical decision making.  After discussing with the primary medicine service and nephrology service, palliative team met with patient at bedside.  He recalls our previous interactions from prior admission.  He reports that prior to this acute illness he was living in an assisted living facility, [facility].  He notes ongoing decline in his functional status, not venturing out into the dining area as he used to.  He also notes that he is sleeping more throughout the daytime.  Appetite is labile.  He continues to enjoy his weekly bridge meetings and monthly [employer] meetings, however notes that it is becoming more difficult to obtain these things secondary to his ongoing fatigue and weakness.  He had little desire to follow-up with clinicians in the outpatient setting, as this to his exhausting.  He notes ongoing dyspnea symptoms with exertion, actually requests to utilize CPAP during assessment because of this.  He has had some improved compliance with daily medications.  Patient agreeable to continued palliative care follow up and discussions regarding goals of care and advance care planning.     Review of Systems   Constitutional:  Positive for activity change, appetite change and  fatigue.   Respiratory:  Positive for shortness of breath.    Gastrointestinal:  Positive for diarrhea.   Musculoskeletal:  Positive for arthralgias.   Neurological:  Positive for weakness.        Medical History:  Past Medical History:   Diagnosis Date    Aortic stenosis     status post ROSS procedure, remote, with December 2015 echocardiography revealing normal aortic and pulmonary valve function, normal ejection fraction and mild to moderate MR    Arthritis     Bilateral impacted cerumen 11/23/2016    Bronchitis     CHF (congestive heart failure)     Chronic gout of right foot 06/13/2016    Impression: 03/08/2016 - stable.;     COVID-19 vaccine series completed 05/12/2021    Maderna 2nd dose 3/12/21.    Depression     Diabetes mellitus     Diverticulitis     Exogenous obesity     Gout     Heart murmur     History of vitamin D deficiency     Hypercholesteremia 08/11/2016    Hyperlipidemia     Hypertension     Impaired mobility     Kidney lesion     Malignant neoplasm of prostate     Morbid obesity 08/11/2016    Pneumonia 05/12/2021    Primary osteoarthritis involving multiple joints 06/13/2016    Impression: 03/08/2016 - stable;     Pulmonary embolism     Sleep apnea 05/12/2021    C-Pap    Uncontrolled type 2 diabetes mellitus with hyperglycemia 06/13/2016    Impression: 03/08/2016 - seeing Endo .;     Vertigo       Past Surgical History:   Procedure Laterality Date    CARDIAC VALVE REPLACEMENT  05/12/2021    Ross procedure 22 years ago    COLON SURGERY      COLONOSCOPY      COLOSTOMY  1999    COLOSTOMY REVISION      CYSTOSCOPY N/A 12/7/2021    Procedure: CYSTOSCOPY FLEXIBLE;  Surgeon: José Miguel Villafuerte Jr., MD;  Location: [facility];  Service: Urology;  Laterality: N/A;    EXPLORATORY LAPAROTOMY      With Tissue Removal    LIPOMA EXCISION      MOHS SURGERY      NEPHROURETERECTOMY Right 12/7/2021    Procedure: RIGHT NEPHROURETERECTOMY LAPAROSCOPIC WITH DAVINCI ROBOT;  Surgeon: José Miguel Villafuerte Jr., MD;   Location: [facility];  Service: Robotics - DaVinci;  Laterality: Right;    OTHER SURGICAL HISTORY      Porcine Valve    PROSTATE SURGERY      PROSTATECTOMY  2000     History of Prostatectomy Perineal Radical    SKIN CANCER EXCISION      from head and lip    TONSILLECTOMY       Family History   Problem Relation Age of Onset    Diabetes Mother     Lung cancer Mother     Cancer Mother     Heart disease Father     Breast cancer Other     Cancer Other     Hypertension Other     Migraines Other     Obesity Other     Diabetes Other      Allergies: Ampicillin, Medrol [methylprednisolone], Myrbetriq [mirabegron], Penicillins, Bee venom, and Melatonin    Hospital Scheduled Medications:    Current Facility-Administered Medications:     [COMPLETED] amiodarone 150 mg in 100 mL D5W (loading dose), 150 mg, Intravenous, Once, Stopped at 25 1821 **FOLLOWED BY** [] amiodarone 360 mg in 200 mL D5W infusion, 1 mg/min, Intravenous, Continuous, Stopped at 25 0004 **FOLLOWED BY** amiodarone 360 mg in 200 mL D5W infusion, 0.5 mg/min, Intravenous, Continuous, Kerley, Brian Joseph, DO, Last Rate: 16.67 mL/hr at 25 1305, 0.5 mg/min at 25 1305    apixaban (ELIQUIS) tablet 2.5 mg, 2.5 mg, Oral, Q12H, Kerley, Brian Joseph, DO, 2.5 mg at 25 1100    cefepime 2000 mg IVPB in 100 mL NS (VTB), 2,000 mg, Intravenous, Q24H, Kerley, Brian Joseph, DO, 2,000 mg at 25 195    dextrose (D50W) (25 g/50 mL) IV injection 25 g, 25 g, Intravenous, Q15 Min PRN, Kerley, Brian Joseph, DO    dextrose (GLUTOSE) oral gel 15 g, 15 g, Oral, Q15 Min PRN, Kerley, Brian Joseph, DO    glucagon (GLUCAGEN) injection 1 mg, 1 mg, Intramuscular, Q15 Min PRN, Kerley, Brian Joseph, DO    Insulin Lispro (humaLOG) injection 2-9 Units, 2-9 Units, Subcutaneous, 4x Daily AC & at Bedtime, Kerley, Brian Joseph, DO, 2 Units at 25    metoprolol succinate XL (TOPROL-XL) 24 hr tablet 25 mg, 25 mg, Oral, Daily, Kerley, Brian Joseph, DO, 25  "mg at 04/09/25 1100    mupirocin (BACTROBAN) 2 % nasal ointment 1 Application, 1 Application, Each Nare, BID, Deanne Bowles MD, 1 Application at 04/09/25 1100    nitroglycerin (NITROSTAT) SL tablet 0.4 mg, 0.4 mg, Sublingual, Q5 Min PRN, Kerley, Brian Joseph, DO    ondansetron (ZOFRAN) injection 4 mg, 4 mg, Intravenous, Q6H PRN, Kerley, Brian Joseph, DO    Pharmacy Consult Cefepime, , Not Applicable, Continuous PRN, Kerley, Brian Joseph, DO    phenylephrine (JOSEPH-SYNEPHRINE) 50 mg in 250 mL NS infusion, 0.5-3 mcg/kg/min, Intravenous, Titrated, Deanne Bowles MD, Last Rate: 13.05 mL/hr at 04/09/25 1305, 0.5 mcg/kg/min at 04/09/25 1305    [Held by provider] pravastatin (PRAVACHOL) tablet 40 mg, 40 mg, Oral, Daily, Kerley, Brian Joseph, DO, 40 mg at 04/08/25 1039    sodium chloride 0.9 % flush 10 mL, 10 mL, Intravenous, Q12H, Kerley, Brian Joseph, DO, 10 mL at 04/09/25 1100    sodium chloride 0.9 % flush 10 mL, 10 mL, Intravenous, PRN, Kerley, Brian Joseph, DO    sodium chloride 0.9 % infusion 40 mL, 40 mL, Intravenous, PRN, Kerley, Brian Joseph, DO  I have utilized all immediate resources to obtain, update, or review the patient's current medications (including all prescription, over-the-counter products, herbals, and/or nutritional supplements).      Vitals: BP (!) 85/49   Pulse 62   Temp 97.4 °F (36.3 °C) (Oral)   Resp 15   Ht 167.6 cm (66\")   Wt 87.9 kg (193 lb 12.6 oz)   SpO2 95%   BMI 31.28 kg/m²     Intake/Output Summary (Last 24 hours) at 4/9/2025 1509  Last data filed at 4/9/2025 0600  Gross per 24 hour   Intake 905 ml   Output 575 ml   Net 330 ml       Physical Exam  Vitals and nursing note reviewed.   Constitutional:       General: He is not in acute distress.     Appearance: He is not diaphoretic.      Comments: Chronically ill appearing, elderly male lying in bed, NAD   HENT:      Head: Normocephalic and atraumatic.      Mouth/Throat:      Mouth: Mucous membranes are moist.      Pharynx: " Oropharynx is clear.   Eyes:      Conjunctiva/sclera: Conjunctivae normal.      Pupils: Pupils are equal, round, and reactive to light.   Cardiovascular:      Rate and Rhythm: Normal rate and regular rhythm.   Pulmonary:      Effort: Pulmonary effort is normal. No respiratory distress.      Breath sounds: Rhonchi present. No wheezing.   Abdominal:      General: Bowel sounds are normal. There is no distension.      Palpations: Abdomen is soft.      Tenderness: There is abdominal tenderness (generalized with palpation).   Musculoskeletal:         General: No swelling. Normal range of motion.      Cervical back: Neck supple.   Skin:     General: Skin is warm and dry.      Capillary Refill: Capillary refill takes less than 2 seconds.   Neurological:      Mental Status: He is alert and oriented to person, place, and time.   Psychiatric:         Mood and Affect: Mood normal.         Behavior: Behavior normal.          Diagnostics Review:  Laboratory Data:  Results from last 7 days   Lab Units 04/09/25  0506 04/08/25  0441 04/07/25  1753   SODIUM mmol/L 136 134* 131*   POTASSIUM mmol/L 4.5 4.8 5.0   CHLORIDE mmol/L 99 97* 94*   CO2 mmol/L 20.0* 17.3* 21.5*   BUN mg/dL 92* 94* 95*   CREATININE mg/dL 2.64* 2.74* 2.83*   CALCIUM mg/dL 7.5* 7.4* 8.0*   ALBUMIN g/dL 3.7 3.7 4.0   BILIRUBIN mg/dL 1.0 1.2 1.3*   ALK PHOS U/L 151* 163* 184*   ALT (SGPT) U/L 72* 82* 77*   AST (SGOT) U/L 67* 93* 88*   GLUCOSE mg/dL 120* 183* 146*     Results from last 7 days   Lab Units 04/09/25  0506 04/08/25  0441 04/07/25  1753   WBC 10*3/mm3 8.65 8.71 6.87   HEMOGLOBIN g/dL 12.1* 12.0* 12.2*   HEMATOCRIT % 36.4* 37.3* 37.1*   PLATELETS 10*3/mm3 152 180 137*             Results from last 7 days   Lab Units 04/08/25  1823   COLOR UA  Yellow   GLUCOSE UA  500 mg/dL (2+)*   KETONES UA  Negative   BLOOD UA  Negative   LEUKOCYTES UA  Small (1+)*   PH, URINE  <=5.0   BILIRUBIN UA  Negative   UROBILINOGEN UA  0.2 E.U./dL     Pain Management Panel  More  data exists         4/4/2024 12/29/2022   Pain Management Panel   Creatinine, Urine 10  72.7      Results from last 7 days   Lab Units 04/07/25  1916 04/07/25  1910 04/06/25  1330   BLOODCX  No growth at 24 hours No growth at 24 hours  --    URINECX   --   --  50,000 CFU/mL Mixed Sissy Isolated     Nutritional Status:   Results from last 7 days   Lab Units 04/09/25  0506 04/08/25  0441 04/07/25  1753   ALBUMIN g/dL 3.7 3.7 4.0     Body mass index is 31.28 kg/m².  Documented weights    04/07/25 1732 04/08/25 0043 04/09/25 0400   Weight: 90.7 kg (200 lb) 87 kg (191 lb 12.8 oz) 87.9 kg (193 lb 12.6 oz)        Radiology:  US Gallbladder  Result Date: 4/9/2025  RIGHT UPPER QUADRANT ULTRASOUND  HISTORY: Abnormal CT abdomen pelvis, edematous gallbladder; U07.1-COVID-19; R57.9-Shock, unspecified; I48.91-Unspecified atrial fibrillation  COMPARISON: None.  PROCEDURE: Ultrasound images of the right upper quadrant were obtained.  FINDINGS:  The liver parenchyma is heterogeneous with mildly nodular contour and perihepatic ascites suggesting chronic liver disease such as cirrhosis. Recommend correlation with liver function test. Liver measures 12.8 cm in length. There is no focal abnormality. Portal vein measures 13 mm in diameter, within normal limits. Right pleural effusion noted. The gallbladder is proper size with no wall thickening or pericholecystic fluid. There are  no gallstones. There is some dependent hypoechoic material consistent with sludge. No evidence of ductal dilatation is identified. Limited images of the pancreas are  obscured by bowel gas. Right kidney absent. [  ]      Nodular, heterogeneous liver with ascites suggesting cirrhosis. Recommend correlation with liver function test.  Right pleural effusion as seen on chest CT of 4/7/2025.  Gallbladder sludge but no gallstones identified.  This report was signed and finalized on 4/9/2025 9:47 AM by Di Ayon MD.      CT Abdomen Pelvis With Contrast  Result  Date: 4/7/2025  FINAL REPORT TECHNIQUE: null CLINICAL HISTORY: Hypotension, shock COMPARISON: null FINDINGS: CT abdomen and pelvis with contrast Comparison: CT - CT ANGIOGRAM CHEST PULMONARY EMBOLISM - 4/7/25 20:55 EDT Findings: Large right and small left pleural effusions with associated atelectasis. Cardiomegaly. Relatively small lobulated liver. No bile duct dilatation. Main portal vein 1.7 cm diameter with recanalized umbilical vein collateral. 2 mm nonobstructing left inferior renal stone. Subcentimeter left mid renal cyst. Right nephrectomy. Edematous gallbladder. No bile duct dilatation. Abdominal solid organs otherwise unremarkable. There is wall thickening of the ascending colon and distal rectum with no inflammatory change. No bowel obstruction, pneumoperitoneum, or pneumatosis. Minimal upper abdominal ascites. Pelvic contents unremarkable. Normal appendix. Prostatectomy. Urinary bladder is partially decompressed and mildly thick-walled as a result. Small right superior urinary bladder diverticulum. Grade 1 chronic dyspneic L5-S1 spondylolisthesis. Moderate to severe lumbar degenerative spondylosis. No acute fracture. Generalized edema, especially in the subcutaneous tissues.     IMPRESSION: 1. 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 2. Edematous gallbladder is likely secondary to 3rd spacing of fluid rather than cholecystitis. 3. Thick-walled ascending colon and distal rectum are indeterminate. Correlate with any signs or symptoms of colitis. 4. Additional findings as above. Authenticated and Electronically Signed by Ventura Obando MD on 04/07/2025 09:55:20 PM    CT Angiogram Chest Pulmonary Embolism  Result Date: 4/7/2025  FINAL REPORT TECHNIQUE: null CLINICAL HISTORY: Hypotension, shock COMPARISON: null FINDINGS: CT angiography chest with contrast. 3D Postprocessing. Comparison: CT - CT ABDOMEN PELVIS W CONTRAST - 4/7/25 20:55 EDT CT/SR - CT ANGIOGRAM CHEST PULMONARY  EMBOLISM - 12/21/24 19:34 EST Findings: Cardiomegaly. No pericardial effusion. RV/LV ratio normal. Ascending aorta 4.6 cm diameter. Lumen is not opacified. Similar to prior. Enlarged pulmonary artery. This can be seen with pulmonary artery hypertension. The visualized thyroid and mediastinum are unremarkable. Lower lobe pulmonary arteries are not opacified. Likely due to poor cardiac output. Large right and small left pleural effusions. Dependent atelectasis both lungs, more prominent on the right. No pneumothorax. Abdominal findings are discussed on the same day comparison. No acute fractures.     IMPRESSION: 1. Bilateral pleural effusions. Given the abnormal findings, this could be secondary to decompensated cirrhotic portal hypertension. 2. No acute pulmonary embolus within the limitations of the exam. 3. 4.6 cm diameter dilated ascending aorta. Recommend follow-up as needed. 4. Additional findings as above. Authenticated and Electronically Signed by Ventura Obando MD on 04/07/2025 09:54:16 PM    XR Chest 1 View  Result Date: 4/7/2025  FINAL REPORT TECHNIQUE: null CLINICAL HISTORY: SOA COMPARISON: null FINDINGS: Single view of the chest. COMPARISON: None FINDINGS: Status post sternotomy. Cardiomegaly. Atherosclerotic thoracic aorta. Mild prominence of the central pulmonary vasculature. Blunting of the right costophrenic angle. No pneumothorax. No consolidation. No fracture identified. Mild spondylosis.     IMPRESSION: 1. Small right pleural effusion. 2. Cardiomegaly with likely mild pulmonary vascular congestion. Authenticated and Electronically Signed by Jessee Scott MD on 04/07/2025 07:09:41 PM        Goals of Care/Advance Care Planning:  Advance Care Planning     1. Determination of Decisional Capacity:  Decisional capacity: YES     2. Advanced Directives:  I have personally reviewed Advance Directives on file in the form of POA  Healthcare surrogate/legal decision maker: Susana Dietz  Relationship to  individual: dtr  Surrogate/decision maker understands role and will honor individual's decisions: YES    3. Patient & Family Meeting:  Members present at meeting patient, Marie Finch, Jimenajaren Silvestre  Secondary discussion with daughter Susana by phone  Meeting took place at [facility] ICU patient room     4. Summary of the discussion:  After generalized introductions, reviewed the role of palliative care in assisting with complex medical decision making, goals of care discussions, and symptom management/supportive care.  Patient served in the [employer].  He pursued a bachelor's in anthropology, following by obtaining his degree in Law.  He practiced as [employer]  for many years.  He enjoys history, in particular Civil War.  He has 3 children, 2 sons are estranged, noting his daughter is his primary and most active person in his healthcare needs.  I reviewed patient's acute and chronic illness, patient expressing insight regarding the same.  He reflects on his ongoing decline in health, along with advanced age.  He understands that his body is declining and that his conditions are terminal.  Ultimately, he desires to focus on maintaining quality of life, things that matter most to him.  He would ultimately desire to have stabilization and be able to transition back to a home setting and be able to enjoy a few more games of bridge.  However, he understands that his body may not cooperate.  I introduced hospice services, and the goals associated with the same.  Patient feeling that his goals are in alignment and is agreeable to referral.  He asks that I speak with his daughter to review additional details.  I confirmed CODE STATUS DNR/DNI, patient agreeable to maintaining plan in place to see if he is able to have some improvement and return home with hospice care, however electing NO ESCALATION OF CARE moving forward.  I spoke with Susana by phone, who also feels that her father is entering in the final stages of  life.  She is agreeable to hospice referral.  She is also agreeable to seeing how patient does over the next 24-48 hours, hopeful that he will have some stabilization and can utilize home hospice services.  However, if patient does not have improvement, is open to transition to comfort measures with potential inpatient hospice referral.  She expresses concerns with returning to [facility], interested in discussions with CM regarding LTC with hospice, if clinically appropriate.    CODE STATUS reviewed/confirmed: DNR / DNI  and NO ESCALATION OF CARE   Baseline Functional Status: Palliative Performance Scale Score: Performance 50% based on the following measures: Ambulation: Mainly sit or lie down, Activity and Evidence of Disease: Unable to do any work, extensive evidence of disease, Self-Care: Considerable assistance required,  Intake: Normal or reduced, LOC: Full or confusion           Palliative Care Assessment:  HFrEF (EF 36-40%) with acute exacerbation  Shock?  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  Cirrhosis  Debility    Recommendations/Plan:  - CODE STATUS reviewed/confirmed: DNR / DNI  and NO ESCALATION OF CARE   - GOC discussion yield to maintain plan in place as outlined per the primary medicine service over the next 24 to 48 hours, agreeable to hospice referral  - Ultimately patient desiring for no further escalation of care, if unable to stabilize over the next 24 to 48 hours, likely transition to comfort measures  - Potential for inpatient hospice referral, however dependent on clinical course  - Patient likely qualifies for hospice under general criteria given his advanced age, multiple comorbid conditions, and progressive functional decline, and poor nutrition  - Life limiting condition  - Treatment goals are for comfort rather than cure  - Documented terminal disease   - Decline in nutritional status   - Clinical progression of disease  - Repeated inpatient  admission/ED evaluation  - Functional status decline  - KPS/PPS <70  - Difficult to prognosticate given current interventions in place, however he is high risk for acute decompensation and clinical decline including death, overall prognosis is quite poor  - If patient has stabilization and is able to remain of pressor support, may consider exploring [facility] for hospice care as would prognosticate <3 months  - CM following regarding ultimate DCP, likely patient will need LTC facility if he is able to stabilize  - Current Functional Status: Palliative Performance Scale Score: Performance 40% based on the following measures: Ambulation: Mainly in bed, Activity and Evidence of Disease: Unable to do any work, extensive evidence of disease, Self-Care: Mainly assistance required,  Intake: Normal or reduced, LOC: Full, drowsy or confusion  - Palliative care will continue to follow/support patient and family        I appreciate the opportunity to participate in the care of Mr. Blane Dietz.  Please do not hesitate to contact me with any questions or concerns.      I spent 90 total minutes conducting chart review for relevant information, face to face assessment, counseling patient and/or family, and coordination of care.  10 minutes spent discussing advanced care planning.  Part of this note may be an electronic transcription/translation of spoken language to printed text using the Dragon Dictation System.       Jimena Silvestre PA-C  04/09/25  15:09 EDT

## 2025-04-09 NOTE — THERAPY EVALUATION
PT evaluation refused by pt this morning due to not feeling like moving. PT will follow up at a later time.

## 2025-04-10 LAB
ALBUMIN SERPL-MCNC: 3.4 G/DL (ref 3.5–5.2)
ALBUMIN/GLOB SERPL: 1.4 G/DL
ALP SERPL-CCNC: 136 U/L (ref 39–117)
ALT SERPL W P-5'-P-CCNC: 56 U/L (ref 1–41)
ANION GAP SERPL CALCULATED.3IONS-SCNC: 16.8 MMOL/L (ref 5–15)
AST SERPL-CCNC: 44 U/L (ref 1–40)
BASOPHILS # BLD AUTO: 0.03 10*3/MM3 (ref 0–0.2)
BASOPHILS NFR BLD AUTO: 0.6 % (ref 0–1.5)
BILIRUB SERPL-MCNC: 0.7 MG/DL (ref 0–1.2)
BUN SERPL-MCNC: 91 MG/DL (ref 8–23)
BUN/CREAT SERPL: 34.1 (ref 7–25)
CALCIUM SPEC-SCNC: 7.2 MG/DL (ref 8.6–10.5)
CHLORIDE SERPL-SCNC: 99 MMOL/L (ref 98–107)
CO2 SERPL-SCNC: 19.2 MMOL/L (ref 22–29)
CREAT SERPL-MCNC: 2.67 MG/DL (ref 0.76–1.27)
DEPRECATED RDW RBC AUTO: 50.9 FL (ref 37–54)
EGFRCR SERPLBLD CKD-EPI 2021: 22.4 ML/MIN/1.73
EOSINOPHIL # BLD AUTO: 0.21 10*3/MM3 (ref 0–0.4)
EOSINOPHIL NFR BLD AUTO: 4 % (ref 0.3–6.2)
ERYTHROCYTE [DISTWIDTH] IN BLOOD BY AUTOMATED COUNT: 16.3 % (ref 12.3–15.4)
GLOBULIN UR ELPH-MCNC: 2.4 GM/DL
GLUCOSE BLDC GLUCOMTR-MCNC: 138 MG/DL (ref 70–130)
GLUCOSE BLDC GLUCOMTR-MCNC: 173 MG/DL (ref 70–130)
GLUCOSE BLDC GLUCOMTR-MCNC: 176 MG/DL (ref 70–130)
GLUCOSE BLDC GLUCOMTR-MCNC: 179 MG/DL (ref 70–130)
GLUCOSE SERPL-MCNC: 150 MG/DL (ref 65–99)
HCT VFR BLD AUTO: 33.6 % (ref 37.5–51)
HGB BLD-MCNC: 11.1 G/DL (ref 13–17.7)
IMM GRANULOCYTES # BLD AUTO: 0.04 10*3/MM3 (ref 0–0.05)
IMM GRANULOCYTES NFR BLD AUTO: 0.8 % (ref 0–0.5)
LYMPHOCYTES # BLD AUTO: 0.67 10*3/MM3 (ref 0.7–3.1)
LYMPHOCYTES NFR BLD AUTO: 12.6 % (ref 19.6–45.3)
MCH RBC QN AUTO: 29.5 PG (ref 26.6–33)
MCHC RBC AUTO-ENTMCNC: 33 G/DL (ref 31.5–35.7)
MCV RBC AUTO: 89.4 FL (ref 79–97)
MONOCYTES # BLD AUTO: 0.68 10*3/MM3 (ref 0.1–0.9)
MONOCYTES NFR BLD AUTO: 12.8 % (ref 5–12)
NEUTROPHILS NFR BLD AUTO: 3.67 10*3/MM3 (ref 1.7–7)
NEUTROPHILS NFR BLD AUTO: 69.2 % (ref 42.7–76)
NRBC BLD AUTO-RTO: 0 /100 WBC (ref 0–0.2)
PLATELET # BLD AUTO: 99 10*3/MM3 (ref 140–450)
PMV BLD AUTO: 11.1 FL (ref 6–12)
POTASSIUM SERPL-SCNC: 3.8 MMOL/L (ref 3.5–5.2)
PROT SERPL-MCNC: 5.8 G/DL (ref 6–8.5)
RBC # BLD AUTO: 3.76 10*6/MM3 (ref 4.14–5.8)
SODIUM SERPL-SCNC: 135 MMOL/L (ref 136–145)
WBC NRBC COR # BLD AUTO: 5.3 10*3/MM3 (ref 3.4–10.8)

## 2025-04-10 PROCEDURE — 99232 SBSQ HOSP IP/OBS MODERATE 35: CPT | Performed by: FAMILY MEDICINE

## 2025-04-10 PROCEDURE — 82948 REAGENT STRIP/BLOOD GLUCOSE: CPT | Performed by: STUDENT IN AN ORGANIZED HEALTH CARE EDUCATION/TRAINING PROGRAM

## 2025-04-10 PROCEDURE — 97162 PT EVAL MOD COMPLEX 30 MIN: CPT

## 2025-04-10 PROCEDURE — 82948 REAGENT STRIP/BLOOD GLUCOSE: CPT

## 2025-04-10 PROCEDURE — 80053 COMPREHEN METABOLIC PANEL: CPT | Performed by: STUDENT IN AN ORGANIZED HEALTH CARE EDUCATION/TRAINING PROGRAM

## 2025-04-10 PROCEDURE — 25010000002 AMIODARONE IN DEXTROSE 5% 360-4.14 MG/200ML-% SOLUTION: Performed by: STUDENT IN AN ORGANIZED HEALTH CARE EDUCATION/TRAINING PROGRAM

## 2025-04-10 PROCEDURE — 85025 COMPLETE CBC W/AUTO DIFF WBC: CPT | Performed by: STUDENT IN AN ORGANIZED HEALTH CARE EDUCATION/TRAINING PROGRAM

## 2025-04-10 PROCEDURE — 25010000002 CEFEPIME PER 500 MG: Performed by: STUDENT IN AN ORGANIZED HEALTH CARE EDUCATION/TRAINING PROGRAM

## 2025-04-10 PROCEDURE — 63710000001 INSULIN LISPRO (HUMAN) PER 5 UNITS: Performed by: STUDENT IN AN ORGANIZED HEALTH CARE EDUCATION/TRAINING PROGRAM

## 2025-04-10 RX ORDER — LIDOCAINE HYDROCHLORIDE 20 MG/ML
5 SOLUTION OROPHARYNGEAL 2 TIMES DAILY PRN
Status: DISCONTINUED | OUTPATIENT
Start: 2025-04-10 | End: 2025-04-10

## 2025-04-10 RX ORDER — AMIODARONE HYDROCHLORIDE 200 MG/1
200 TABLET ORAL
Status: DISCONTINUED | OUTPATIENT
Start: 2025-04-10 | End: 2025-04-23 | Stop reason: HOSPADM

## 2025-04-10 RX ORDER — LIDOCAINE HYDROCHLORIDE 20 MG/ML
5 SOLUTION OROPHARYNGEAL 2 TIMES DAILY PRN
Status: DISCONTINUED | OUTPATIENT
Start: 2025-04-10 | End: 2025-04-10 | Stop reason: ALTCHOICE

## 2025-04-10 RX ORDER — HYDROXYZINE PAMOATE 50 MG/1
50 CAPSULE ORAL 3 TIMES DAILY PRN
Status: DISCONTINUED | OUTPATIENT
Start: 2025-04-10 | End: 2025-04-23 | Stop reason: HOSPADM

## 2025-04-10 RX ADMIN — AMIODARONE HYDROCHLORIDE 200 MG: 200 TABLET ORAL at 08:26

## 2025-04-10 RX ADMIN — HYDROXYZINE PAMOATE 50 MG: 25 CAPSULE ORAL at 19:12

## 2025-04-10 RX ADMIN — MIDODRINE HYDROCHLORIDE 5 MG: 5 TABLET ORAL at 11:40

## 2025-04-10 RX ADMIN — MUPIROCIN 1 APPLICATION: 20 OINTMENT TOPICAL at 08:26

## 2025-04-10 RX ADMIN — MIDODRINE HYDROCHLORIDE 5 MG: 5 TABLET ORAL at 19:12

## 2025-04-10 RX ADMIN — Medication 10 ML: at 08:26

## 2025-04-10 RX ADMIN — BENZOCAINE: 5.6 OINTMENT TOPICAL at 16:07

## 2025-04-10 RX ADMIN — APIXABAN 2.5 MG: 2.5 TABLET, FILM COATED ORAL at 08:25

## 2025-04-10 RX ADMIN — MIDODRINE HYDROCHLORIDE 5 MG: 5 TABLET ORAL at 08:25

## 2025-04-10 RX ADMIN — AMIODARONE HYDROCHLORIDE 0.5 MG/MIN: 1.8 INJECTION, SOLUTION INTRAVENOUS at 00:45

## 2025-04-10 RX ADMIN — INSULIN LISPRO 2 UNITS: 100 INJECTION, SOLUTION INTRAVENOUS; SUBCUTANEOUS at 21:10

## 2025-04-10 RX ADMIN — CEFEPIME 2000 MG: 2 INJECTION, POWDER, FOR SOLUTION INTRAVENOUS at 21:08

## 2025-04-10 RX ADMIN — Medication 10 ML: at 21:10

## 2025-04-10 NOTE — PROGRESS NOTES
[facility]    Name:  Blane Dietz   Age:  89 y.o.  Sex:  male  :  1935  MRN:  [id number]   Visit Number:  [id number]  Admission Date:  2025  Date Of Service:  04/10/25  Primary Care Physician:  David Em MD     LOS: 3 days :    Chief Complaint:      Shortness of air, N/V/D     Subjective:    Patient was seen examined bedside today.  Patient sitting up comfortably in bed.  Patient was on CPAP at night.  Reports doing better today overall.  Reports improvement on his diarrhea.  Denies shortness of breath currently.  Blood pressure is more stable on midodrine, has not required Jr-Synephrine since last night.  Afebrile.  Denies any other acute complaints or pain today.    Hospital Course:    Blane Dietz is an 89-year-old man with past medical history of heart failure reduced ejection fraction, moderate AR, moderate MR, type 2 diabetes, cirrhosis, hypertension, prostate cancer, aortic stenosis status post AVR, HUSSAIN on CPAP, hyperlipidemia, history of diffuse large B-cell lymphoma of kidney, CKD, atrial fibrillation.  Presented to [facility] ED on 2025 with concern for several weeks of various degrees of diarrhea, shortness of air with cough.  Family reports the patient actually has been off of this medication for several weeks.  Denied any chest pain, abdominal pain.  Says that he does feel somewhat short of air.  Denied any fevers or chills.     ED summary: Afebrile, atrial fibrillation with RVR rate as high as 160 resolved with cardioversion, hypotension resolved with fluids and Levophed, vital signs stable on 2 L baseline.  EKG initially atrial fibrillation with RVR rate 161.  After resolution sinus rhythm with first-degree AV block, right bundle branch block.  Troponin monitoring 1, 41, ACS not suspected.  proBNP over 18,000.  Sodium 131, anion gap 13.5, BUN 95, creatinine 2.83, EGFR 20, blood glucose 246, calcium 8.0, magnesium 2.8, Phos 6.4, ,  ALT 77, bilirubin 1.3.  Lactate 2.7, procalcitonin 0.25, no leukocytosis, hemoglobin 12.2.  Blood cultures.  COVID-19 positive.  CT angio chest bilateral pleural effusions, no acute PE, 4.6 cm diameter dilated ascending aorta.  CT ab/pelvis findings suggesting decompensated cirrhotic portal hypertension, edematous gallbladder likely secondary to third spacing, thick-walled descending colon and distal rectum indeterminant.  He was provided IV amiodarone, vancomycin and cefepime, etomidate, Xylocaine, 2 L bolus.  Started on Levophed.  ED physician plans on placing central line.    Review of Systems:     All systems were reviewed and negative except as mentioned in subjective, assessment and plan.    Vital Signs:    Temp:  [97.4 °F (36.3 °C)-97.9 °F (36.6 °C)] 97.5 °F (36.4 °C)  Heart Rate:  [54-80] 60  Resp:  [14-24] 16  BP: ()/(46-81) 100/58    Intake and output:    I/O last 3 completed shifts:  In: 1144.2 [P.O.:237; I.V.:907.2]  Out: 325 [Urine:325]  No intake/output data recorded.    Physical Examination:    General Appearance:  Alert and cooperative.  Chronically ill-appearing.  No acute distress.   Head:  Atraumatic and normocephalic.   Eyes: Conjunctivae and sclerae normal, no icterus. No pallor.   Throat: No oral lesions, no thrush, oral mucosa moist.   Neck: Supple, trachea midline, no thyromegaly.   Lungs:   Breath sounds heard bilaterally equally.  No wheezing.  Bibasilar rhonchi heard.  Decreased air movement bilaterally.  No Pleural rub or bronchial breathing.  Unlabored.  On supplemental oxygen.   Heart:  Normal S1 and S2, no murmur, no gallop, no rub. No JVD.   Abdomen:   Normal bowel sounds, no masses, no organomegaly. Soft, nontender, nondistended, no rebound tenderness.   Extremities: Supple, no edema, no cyanosis, no clubbing.    Skin: No bleeding.  Superficial chronic leg wound, see photo    Neurologic: Alert and oriented x 3. No facial asymmetry. Moves all four limbs. No tremors.   "    Laboratory results:    Results from last 7 days   Lab Units 04/10/25  0522 04/09/25  0506 04/08/25  0441   SODIUM mmol/L 135* 136 134*   POTASSIUM mmol/L 3.8 4.5 4.8   CHLORIDE mmol/L 99 99 97*   CO2 mmol/L 19.2* 20.0* 17.3*   BUN mg/dL 91* 92* 94*   CREATININE mg/dL 2.67* 2.64* 2.74*   CALCIUM mg/dL 7.2* 7.5* 7.4*   BILIRUBIN mg/dL 0.7 1.0 1.2   ALK PHOS U/L 136* 151* 163*   ALT (SGPT) U/L 56* 72* 82*   AST (SGOT) U/L 44* 67* 93*   GLUCOSE mg/dL 150* 120* 183*     Results from last 7 days   Lab Units 04/10/25  0522 04/09/25  0506 04/08/25  0441   WBC 10*3/mm3 5.30 8.65 8.71   HEMOGLOBIN g/dL 11.1* 12.1* 12.0*   HEMATOCRIT % 33.6* 36.4* 37.3*   PLATELETS 10*3/mm3 99* 152 180         Results from last 7 days   Lab Units 04/07/25 1910 04/07/25  1753   HSTROP T ng/L 41* 101*     Results from last 7 days   Lab Units 04/07/25 1916 04/07/25 1910 04/06/25  1330   BLOODCX  No growth at 2 days No growth at 2 days  --    URINECX   --   --  50,000 CFU/mL Mixed Sissy Isolated     No results for input(s): \"PHART\", \"RGT6GDS\", \"PO2ART\", \"FGS0DCA\", \"BASEEXCESS\" in the last 8760 hours.   I have reviewed the patient's laboratory results.    Radiology results:    US Gallbladder  Result Date: 4/9/2025  RIGHT UPPER QUADRANT ULTRASOUND  HISTORY: Abnormal CT abdomen pelvis, edematous gallbladder; U07.1-COVID-19; R57.9-Shock, unspecified; I48.91-Unspecified atrial fibrillation  COMPARISON: None.  PROCEDURE: Ultrasound images of the right upper quadrant were obtained.  FINDINGS:  The liver parenchyma is heterogeneous with mildly nodular contour and perihepatic ascites suggesting chronic liver disease such as cirrhosis. Recommend correlation with liver function test. Liver measures 12.8 cm in length. There is no focal abnormality. Portal vein measures 13 mm in diameter, within normal limits. Right pleural effusion noted. The gallbladder is proper size with no wall thickening or pericholecystic fluid. There are  no gallstones. There is " some dependent hypoechoic material consistent with sludge. No evidence of ductal dilatation is identified. Limited images of the pancreas are  obscured by bowel gas. Right kidney absent. [  ]      Impression: Nodular, heterogeneous liver with ascites suggesting cirrhosis. Recommend correlation with liver function test.  Right pleural effusion as seen on chest CT of 4/7/2025.  Gallbladder sludge but no gallstones identified.  This report was signed and finalized on 4/9/2025 9:47 AM by Di Ayon MD.      I have reviewed the patient's radiology reports.    Medication Review:     I have reviewed the patient's active and prn medications.     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  Transaminitis  Hyperbilirubinemia  Chronic leg wound  AAA     Chronic: Heart failure reduced ejection fraction, moderate AR, moderate MR, type 2 diabetes, cirrhosis, hypertension, prostate cancer, aortic stenosis status post AVR, HUSSAIN on CPAP, hyperlipidemia, history of diffuse large B-cell lymphoma of kidney, CKD, atrial fibrillation    Plan:    Inpatient ICU admission 4/7/2025 with atrial fibrillation with RVR status post cardioversion in ED, hypotension likely multifactorial requiring Levophed, elevated troponins ACS not suspected likely demand ischemia, COVID-19 infection on his baseline 2 L, acute exacerbation of heart failure reduced ejection fraction, hyponatremia, 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     Atrial fibrillation with RVR, resolved  -Amiodarone IV.  -Cardiology consultation, recommendations appreciated.  -Status post cardioversion in the ED.  - Per cardiology commendations, continuing IV amiodarone for a total of 48-72 hours. Then can be changed to oral amiodarone therapy at  200 mg once per day.   - switched to p.o. amiodarone.    Elevated troponins  -ACS not suspected, likely demand ischemia.    Acute exacerbation of heart failure reduced ejection fraction  Bilateral pleural effusions.   -Previous 2D echo on file from December 2024 with EF of 38%  -Daily weight, strict ins and outs  -Diuretics per nephrology  -Cardiology consulted, appreciate recommendations    Hypotension/shock  -Shock likely multifactorial given multiple chronic severe conditions, but could be related to sepsis  -Central line placed in ED.  -Received 2L IVF in ED.  - switched Levophed to Jr-Synephrine in the settings of A-fib with RVR  - added midodrine hoping we can titrate his pressors off  - Has been off Jr-Synephrine since last night.    Colitis, suspected  -Ct showed: Thick-walled ascending colon and distal rectum are indeterminate. Correlate with signs or symptoms of colitis.  - Initially started vancomycin and Zosyn on admission out of an abundance of caution.  -Blood cultures remains negative x 24 hours  -Unsure if hypotension has an element of septic shock, likely multifactorial given multiple chronic severe conditions.  -GI panel and C. Difficile both negative  - MRSA screen negative, discontinue vancomycin, continue Zosyn  - WBC WNL, afebrile    COVID-19 infection  Chronic respiratory failure  -On his 2 L baseline.  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  -Avoid nephrotoxic drugs  -Diuretics per nephrology    Transaminitis  Hyperbilirubinemia  Cirrhosis  -Findings on imaging suggesting decompensated cirrhotic portal hypertension. Edematous gallbladder is likely secondary to 3rd spacing of fluid rather than cholecystitis.  -Right upper quadrant ultrasound ordered and pending  - Abnormal LFTs likely secondary to chronic cirrhosis.  -  Monitor liver function  - Hold statin    Chronic leg wound  -Wound care.    AAA  -Ascending aorta 4.6 cm diameter. Similar to prior.   -Recommend follow-up.     Chronic: Heart failure reduced ejection fraction, moderate AR, moderate MR, type 2 diabetes, cirrhosis, hypertension, prostate cancer, aortic stenosis status post AVR, HUSSAIN on CPAP, hyperlipidemia, history of diffuse large B-cell lymphoma of kidney, CKD, atrial fibrillation  -Subcutaneous insulin protocol.  -Continue other home medications.     -I have reviewed the copied text and it is accurate as of 4/10/2025   - Will consider transferring him out of the ICU if remains off IV pressors and also discontinuing central line.    -Palliative care consulted for further discussions on goals of care, patient does not want any escalation of care, he is agreeable for hospice at this point but still undecided if his going to do home with hospice versus hospice care center.  Will see how patient will progress over the next day or 2 and decide based on that.    DVT Prophylaxis: Eliquis  Code Status: DNR/DNI  Diet: Cardiac/renal  Discharge Plan: To be determined, likely needs 1 to 2 days in the hospital.    Deanne Bowles MD  04/10/25  07:46 EDT    Dictated utilizing Dragon dictation.

## 2025-04-10 NOTE — PLAN OF CARE
Goal Outcome Evaluation:           Progress: no change  Outcome Evaluation: Patient is a/o x 4, forgetful. Alternating CPAP and 2LNC. Up to chair for a few hours. Family visited and daughter is bedside currently. PRN medication ordered for discomfort to perineum. Otherwise no complaints.

## 2025-04-10 NOTE — PROGRESS NOTES
[facility] Progress Note  [facility]. [place]        Patient Name: Blane Dietz  : 1935  MRN: [id number]   LOS: 3 days    Patient Care Team:  David Em MD as PCP - General (Internal Medicine)  Ha Toussaint MD as Consulting Physician (Cardiology)  Shannan Ramon MD as Consulting Physician (Hematology and Oncology)  Xavier Boston MD as Consulting Physician (Endocrinology)  Brionna Weinstein DO as Surgeon (General Surgery)  Sophia Ferreira PA as Physician Assistant (Endocrinology)  Abbey Reyez APRN as Nurse Practitioner (Nurse Practitioner)    Chief Complaint:    Chief Complaint   Patient presents with    Shortness of Breath     Primary Care Physician:  David Em MD  Date of admission: 2025    Subjective     Interval History:   Follow-up acute on 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   Patient is awake alert and interactive denies having any chest pain shortness of breath, he has been off pressors since yesterday evening.  Ongoing discussion with palliative care.  Events noted from last 24 hours.  I reviewed the chart and other providers notes, labs and procedures done since my last note.    Review of Systems:   As noted above.    Objective     Vitals:   Temp:  [97.4 °F (36.3 °C)-97.9 °F (36.6 °C)] 97.5 °F (36.4 °C)  Heart Rate:  [54-80] 58  Resp:  [14-24] 16  BP: ()/(46-81) 98/63  Flow (L/min) (Oxygen Therapy):  [3-5] 3    Intake/Output Summary (Last 24 hours) at 4/10/2025 0652  Last data filed at 4/10/2025 0500  Gross per 24 hour   Intake 779.19 ml   Output --   Net 779.19 ml       Physical Exam:    General Appearance: alert, oriented x 3, no acute distress   Skin: warm and dry  HEENT: oral mucosa normal, nonicteric sclera  Neck: supple, no JVD  Lungs: CTA  Heart: IRRR,   Abdomen: obese, soft, nontender, non distended and positive bowel sounds.  : no palpable bladder  Extremities: Trace edema, no cyanosis or  clubbing  Neuro: normal speech and mental status     Scheduled Meds:     Current Facility-Administered Medications   Medication Dose Route Frequency Provider Last Rate Last Admin    amiodarone 360 mg in 200 mL D5W infusion  0.5 mg/min Intravenous Continuous Kerley, Brian Joseph, DO 16.67 mL/hr at 04/10/25 0045 0.5 mg/min at 04/10/25 0045    apixaban (ELIQUIS) tablet 2.5 mg  2.5 mg Oral Q12H Kerley, Brian Joseph, DO   2.5 mg at 04/09/25 2039    cefepime 2000 mg IVPB in 100 mL NS (VTB)  2,000 mg Intravenous Q24H Kerley, Brian Joseph, DO   2,000 mg at 04/09/25 2037    dextrose (D50W) (25 g/50 mL) IV injection 25 g  25 g Intravenous Q15 Min PRN Kerley, Brian Joseph, DO        dextrose (GLUTOSE) oral gel 15 g  15 g Oral Q15 Min PRN Kerley, Brian Joseph, DO        glucagon (GLUCAGEN) injection 1 mg  1 mg Intramuscular Q15 Min PRN Kerley, Brian Joseph, DO        Insulin Lispro (humaLOG) injection 2-9 Units  2-9 Units Subcutaneous 4x Daily AC & at Bedtime Kerley, Brian Joseph, DO   4 Units at 04/09/25 2037    metoprolol succinate XL (TOPROL-XL) 24 hr tablet 25 mg  25 mg Oral Daily Kerley, Brian Joseph, DO   25 mg at 04/09/25 1100    midodrine (PROAMATINE) tablet 5 mg  5 mg Oral TID AC Deanne Bowles MD        mupirocin (BACTROBAN) 2 % nasal ointment 1 Application  1 Application Each Nare BID Deanne Bowles MD   1 Application at 04/09/25 2038    nitroglycerin (NITROSTAT) SL tablet 0.4 mg  0.4 mg Sublingual Q5 Min PRN Kerley, Brian Joseph, DO        ondansetron (ZOFRAN) injection 4 mg  4 mg Intravenous Q6H PRN Kerley, Brian Joseph, DO        Pharmacy Consult Cefepime   Not Applicable Continuous PRN Kerley, Brian Joseph, DO        phenylephrine (JOSEPH-SYNEPHRINE) 50 mg in 250 mL NS infusion  0.5-3 mcg/kg/min Intravenous Titrated Deanne Bowles MD   Stopped at 04/09/25 3633    [Held by provider] pravastatin (PRAVACHOL) tablet 40 mg  40 mg Oral Daily Kerley, Brian Joseph, DO   40 mg at 04/08/25 1039    sodium chloride 0.9 %  flush 10 mL  10 mL Intravenous Q12H Kerley, Brian Joseph, DO   10 mL at 04/09/25 2038    sodium chloride 0.9 % flush 10 mL  10 mL Intravenous PRN Kerley, Brian Joseph, DO        sodium chloride 0.9 % infusion 40 mL  40 mL Intravenous PRN Kerley, Brian Joseph,            apixaban, 2.5 mg, Oral, Q12H  cefepime, 2,000 mg, Intravenous, Q24H  insulin lispro, 2-9 Units, Subcutaneous, 4x Daily AC & at Bedtime  metoprolol succinate XL, 25 mg, Oral, Daily  midodrine, 5 mg, Oral, TID AC  mupirocin, 1 Application, Each Nare, BID  [Held by provider] pravastatin, 40 mg, Oral, Daily  sodium chloride, 10 mL, Intravenous, Q12H        IV Meds:   amiodarone, 0.5 mg/min, Last Rate: 0.5 mg/min (04/10/25 0045)  Pharmacy Consult,   phenylephrine, 0.5-3 mcg/kg/min, Last Rate: Stopped (04/09/25 2145)        Results Reviewed:   I have personally reviewed the results from the time of this admission to 4/10/2025 06:52 EDT     Results from last 7 days   Lab Units 04/10/25  0522 04/09/25  0506 04/08/25  0441   SODIUM mmol/L 135* 136 134*   POTASSIUM mmol/L 3.8 4.5 4.8   CHLORIDE mmol/L 99 99 97*   CO2 mmol/L 19.2* 20.0* 17.3*   BUN mg/dL 91* 92* 94*   CREATININE mg/dL 2.67* 2.64* 2.74*   CALCIUM mg/dL 7.2* 7.5* 7.4*   BILIRUBIN mg/dL 0.7 1.0 1.2   ALK PHOS U/L 136* 151* 163*   ALT (SGPT) U/L 56* 72* 82*   AST (SGOT) U/L 44* 67* 93*   GLUCOSE mg/dL 150* 120* 183*       Estimated Creatinine Clearance: 20.5 mL/min (A) (by C-G formula based on SCr of 2.67 mg/dL (H)).    Results from last 7 days   Lab Units 04/07/25  1753   MAGNESIUM mg/dL 2.8*   PHOSPHORUS mg/dL 6.4*             Results from last 7 days   Lab Units 04/10/25  0522 04/09/25  0506 04/08/25  0441 04/07/25  1753   WBC 10*3/mm3 5.30 8.65 8.71 6.87   HEMOGLOBIN g/dL 11.1* 12.1* 12.0* 12.2*   PLATELETS 10*3/mm3 99* 152 180 137*             Brief Urine Lab Results  (Last result in the past 365 days)        Color   Clarity   Blood   Leuk Est   Nitrite   Protein   CREAT   Urine HCG         "04/08/25 1823 Yellow   Clear   Negative   Small (1+)   Negative   30 mg/dL (1+)                   No results found for: \"UTPCR\"    Imaging Results (Last 24 Hours)       Procedure Component Value Units Date/Time    US Gallbladder [[id number]] Collected: 04/09/25 0943     Updated: 04/09/25 0949    Narrative:      RIGHT UPPER QUADRANT ULTRASOUND     HISTORY: Abnormal CT abdomen pelvis, edematous gallbladder;  U07.1-COVID-19; R57.9-Shock, unspecified; I48.91-Unspecified atrial  fibrillation     COMPARISON: None.     PROCEDURE: Ultrasound images of the right upper quadrant were obtained.     FINDINGS:  The liver parenchyma is heterogeneous with mildly nodular  contour and perihepatic ascites suggesting chronic liver disease such as  cirrhosis. Recommend correlation with liver function test. Liver  measures 12.8 cm in length. There is no focal abnormality. Portal vein  measures 13 mm in diameter, within normal limits. Right pleural effusion  noted. The gallbladder is proper size with no wall thickening or  pericholecystic fluid. There are  no gallstones. There is some dependent  hypoechoic material consistent with sludge. No evidence of ductal  dilatation is identified. Limited images of the pancreas are  obscured  by bowel gas. Right kidney absent. [  ]       Impression:      Nodular, heterogeneous liver with ascites suggesting  cirrhosis. Recommend correlation with liver function test.     Right pleural effusion as seen on chest CT of 4/7/2025.     Gallbladder sludge but no gallstones identified.      This report was signed and finalized on 4/9/2025 9:47 AM by Di Ayon MD.                   Assessment / Plan     ASSESSMENT:    COVID-19    Acute kidney injury: Patient does have significant worsening of renal function from his baseline likely hemodynamically mediated as well as cardiorenal syndrome.  Continue to optimize medications.  He does not have any significant electrolyte abnormalities bicarb is stable as " well.  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  Valvular heart disease: Continue to optimize medications.  Coronary artery disease: Not a candidate for any aggressive cardiac workup, cardiac evaluation is in progress.  Congestive heart failure: Will need to optimize his volume status will adjust diuretics as needed.  Atrial fibrillation: Currently on amiodarone with some improvement in atrial fibrillation.  Type 2 diabetes: Continue with the sliding scale  Peripheral vascular disease: Continue to optimize medications no intervention planned.      PLAN:  Renal function no significant change.  Blood pressure is slightly better. Hold off giving diuretics today will need to be reassessed again in the morning.  He has been off IV amiodarone currently on oral dose only.  Continue with 5 mg of midodrine 3 times a day for low blood pressure control.  Details were discussed with the patient no family in the room.    Details were also discussed with the hospitalist service and or other providers as needed.  Palliative care team will have discussion with the patient and family.  Continue with rest of the current treatment plan, and monitor with surveillance labs, adjust medication doses to the eGFR.  Further recommendations will depend on clinical course of the patient during the current hospitalization.   I have reviewed the copied text to this note, it was edited and the changes made as needed.  It is accurate to the point, when the note was signed today.     Thank you for involving us in the care of Blane Dietz.  Please feel free to call with any questions.    Andriy Foote MD, LOAN  04/10/25  06:52 EDT    [facility]  [phone]      Part of this note may be an electronic transcription/translation of spoken language to printed text  using the Dragon Dictation System.

## 2025-04-10 NOTE — THERAPY EVALUATION
Patient Name: Blane Dietz  : 1935    MRN: [id number]                              Today's Date: 4/10/2025       Admit Date: 2025    Visit Dx:     ICD-10-CM ICD-9-CM   1. COVID-19  U07.1 079.89   2. Shock  R57.9 785.50   3. Atrial fibrillation with rapid ventricular response  I48.91 427.31     Patient Active Problem List   Diagnosis    Essential hypertension    Type 2 diabetes mellitus with hyperglycemia, with long-term current use of insulin    Prostate cancer    Aortic stenosis    Sleep apnea    S/P AVR    HLD (hyperlipidemia)    Renal mass    s/p right nepheroureterectomy and adrenalectomy     Diffuse large B-cell lymphoma of solid organ excluding spleen    PICC (peripherally inserted central catheter) flush    History of pulmonary embolism    History of malignant neoplasm of prostate    Lymphoma of kidney    Stage 3b chronic kidney disease    Pneumonia due to COVID-19 virus    Abrasion    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)    Valvular heart disease    Atrial arrhythmia    RBBB    Atrial fibrillation with rapid ventricular response    Acute HFrEF (heart failure with reduced ejection fraction)    COVID-19     Past Medical History:   Diagnosis Date    Aortic stenosis     status post ROSS procedure, remote, with 2015 echocardiography revealing normal aortic and pulmonary valve function, normal ejection fraction and mild to moderate MR    Arthritis     Bilateral impacted cerumen 2016    Bronchitis     CHF (congestive heart failure)     Chronic gout of right foot 2016    Impression: 2016 - stable.;     COVID-19 vaccine series completed 2021    Maderna 2nd dose 3/12/21.    Depression     Diabetes mellitus     Diverticulitis     Exogenous obesity     Gout     Heart murmur     History of vitamin D deficiency     Hypercholesteremia 2016    Hyperlipidemia     Hypertension     Impaired mobility     Kidney lesion     Malignant neoplasm of  prostate     Morbid obesity 08/11/2016    Pneumonia 05/12/2021    Primary osteoarthritis involving multiple joints 06/13/2016    Impression: 03/08/2016 - stable;     Pulmonary embolism     Sleep apnea 05/12/2021    C-Pap    Uncontrolled type 2 diabetes mellitus with hyperglycemia 06/13/2016    Impression: 03/08/2016 - seeing Endo .;     Vertigo      Past Surgical History:   Procedure Laterality Date    CARDIAC VALVE REPLACEMENT  05/12/2021    Ross procedure 22 years ago    COLON SURGERY      COLONOSCOPY      COLOSTOMY  1999    COLOSTOMY REVISION      CYSTOSCOPY N/A 12/7/2021    Procedure: CYSTOSCOPY FLEXIBLE;  Surgeon: José Miguel Villafuerte Jr., MD;  Location:  [facility];  Service: Urology;  Laterality: N/A;    EXPLORATORY LAPAROTOMY      With Tissue Removal    LIPOMA EXCISION      MOHS SURGERY      NEPHROURETERECTOMY Right 12/7/2021    Procedure: RIGHT NEPHROURETERECTOMY LAPAROSCOPIC WITH DAVINCI ROBOT;  Surgeon: José Miguel Villafuerte Jr., MD;  Location:  [facility];  Service: Robotics - DaVinci;  Laterality: Right;    OTHER SURGICAL HISTORY      Porcine Valve    PROSTATE SURGERY      PROSTATECTOMY  2000     History of Prostatectomy Perineal Radical    SKIN CANCER EXCISION      from head and lip    TONSILLECTOMY        General Information       Row Name 04/10/25 1148          Physical Therapy Time and Intention    Document Type evaluation (P)   -     Mode of Treatment physical therapy (P)   -       Row Name 04/10/25 1148          General Information    Patient Profile Reviewed yes (P)   -     Prior Level of Function min assist:;all household mobility;gait;transfer;ADL's (P)   -     Existing Precautions/Restrictions fall (P)   -     Barriers to Rehab previous functional deficit;medically complex (P)   -       Row Name 04/10/25 1148          Living Environment    Current Living Arrangements assisted living facility (P)   -     People in Home facility resident (P)   -       Row Name 04/10/25 1148           Home Main Entrance    Number of Stairs, Main Entrance none (P)   -JH       Row Name 04/10/25 1148          Stairs Within Home, Primary    Number of Stairs, Within Home, Primary none (P)   -JH       Row Name 04/10/25 1148          Cognition    Orientation Status (Cognition) oriented x 4 (P)   -JH       Row Name 04/10/25 1148          Safety Issues/Impairments Affecting Functional Mobility    Safety Issues Affecting Function (Mobility) ability to follow commands;awareness of need for assistance;safety precaution awareness;insight into deficits/self-awareness;safety precautions follow-through/compliance (P)   -     Impairments Affecting Function (Mobility) endurance/activity tolerance;postural/trunk control;strength (P)   -               User Key  (r) = Recorded By, (t) = Taken By, (c) = Cosigned By      Initials Name Provider Type    Farhad Rosales, PT Student PT Student                   Mobility       Row Name 04/10/25 1210          Bed Mobility    Bed Mobility rolling right;rolling left;scooting/bridging (P)   -     Rolling Left [place] (Bed Mobility) standby assist (P)   -     Rolling Right [place] (Bed Mobility) standby assist (P)   -     Scooting/Bridging [place] (Bed Mobility) standby assist (P)   -     Assistive Device (Bed Mobility) bed rails;head of bed elevated (P)   -JH       Row Name 04/10/25 1210          Bed-Chair Transfer    Bed-Chair [place] (Transfers) not tested (P)   -JH       Row Name 04/10/25 1210          Sit-Stand Transfer    Sit-Stand [place] (Transfers) not tested (P)   -JH       Row Name 04/10/25 1210          Gait/Stairs (Locomotion)    [place] Level (Gait) not tested (P)   -     Patient was able to Ambulate no, other medical factors prevent ambulation (P)   -     Reason Patient was unable to Ambulate Excessive Weakness (P)   -     [place] Level (Stairs) not tested (P)   -               User Key  (r) = Recorded By, (t) = Taken By, (c) =  Cosigned By      Initials Name Provider Type     Farhad Kaur, PT Student PT Student                   Obj/Interventions       [facility] Name 04/10/25 1211          Range of Motion Comprehensive    General Range of Motion bilateral lower extremity ROM WFL (P)   -JH       Row Name 04/10/25 1211          Strength Comprehensive (MMT)    General Manual Muscle Testing (MMT) Assessment lower extremity strength deficits identified (P)   -     Comment, General Manual Muscle Testing (MMT) Assessment B LE grossly 4/5 (P)   -JH       Row Name 04/10/25 1211          Motor Skills    Therapeutic Exercise hip;knee;ankle (P)   -JH       Row Name 04/10/25 1211          Hip (Therapeutic Exercise)    Hip (Therapeutic Exercise) strengthening exercise (P)   -     Hip Strengthening (Therapeutic Exercise) bilateral;extension;heel slides;flexion;10 repetitions;supine;other (see comments) (P)   therapist resistance  -JH       Row Name 04/10/25 1211          Knee (Therapeutic Exercise)    Knee (Therapeutic Exercise) strengthening exercise (P)   -     Knee Strengthening (Therapeutic Exercise) bilateral;extension;SAQ (short arc quad);supine;10 repetitions;other (see comments) (P)   therapist resistance  -JH       Row Name 04/10/25 1211          Ankle (Therapeutic Exercise)    Ankle (Therapeutic Exercise) strengthening exercise (P)   -     Ankle Strengthening (Therapeutic Exercise) bilateral;dorsiflexion;plantarflexion;10 repetitions;supine (P)   -JH       Row Name 04/10/25 1211          Balance    Comment, Balance did not sit EOB (P)   -JH       Row Name 04/10/25 1211          Sensory Assessment (Somatosensory)    Sensory Assessment (Somatosensory) sensation intact (P)   -               User Key  (r) = Recorded By, (t) = Taken By, (c) = Cosigned By      Initials Name Provider Type    Farhad Rosales, PT Student PT Student                   Goals/Plan       Row Name 04/10/25 1229          Bed Mobility Goal 1 (PT)    Activity/Assistive  Device (Bed Mobility Goal 1, PT) bed mobility activities, all (P)   -     [place] Level/Cues Needed (Bed Mobility Goal 1, PT) modified independence (P)   -JH     Time Frame (Bed Mobility Goal 1, PT) short term goal (STG) (P)   -JH     Progress/Outcomes (Bed Mobility Goal 1, PT) new goal (P)   -       Row Name 04/10/25 1229          Transfer Goal 1 (PT)    Activity/Assistive Device (Transfer Goal 1, PT) sit-to-stand/stand-to-sit;walker, rolling (P)   -JH     [place] Level/Cues Needed (Transfer Goal 1, PT) contact guard required (P)   -     Time Frame (Transfer Goal 1, PT) short term goal (STG) (P)   -JH     Progress/Outcome (Transfer Goal 1, PT) new goal (P)   -       Row Name 04/10/25 1229          Gait Training Goal 1 (PT)    Activity/Assistive Device (Gait Training Goal 1, PT) gait (walking locomotion);assistive device use;decrease fall risk;improve balance and speed;increase endurance/gait distance;increase energy conservation;walker, rolling (P)   -JH     [place] Level (Gait Training Goal 1, PT) contact guard required (P)   -     Distance (Gait Training Goal 1, PT) 4 (P)   -JH     Time Frame (Gait Training Goal 1, PT) short term goal (STG) (P)   -JH     Progress/Outcome (Gait Training Goal 1, PT) new goal (P)   -       Row Name 04/10/25 1222          Patient Education Goal (PT)    Activity (Patient Education Goal, PT) Pt will perform 10 repetitions of B LE exercises. (P)   -     [place]/Cues/Accuracy (Memory Goal 2, PT) demonstrates adequately;independent;verbalizes understanding (P)   -     Time Frame (Patient Education Goal, PT) long term goal (LTG) (P)   -JH     Progress/Outcome (Patient Education Goal, PT) new goal (P)   -       Row Name 04/10/25 1229          Therapy Assessment/Plan (PT)    Planned Therapy Interventions (PT) balance training;bed mobility training;gait training;home exercise program;motor coordination training;neuromuscular re-education;patient/family  education;postural re-education;transfer training;strengthening (P)   -               User Key  (r) = Recorded By, (t) = Taken By, (c) = Cosigned By      Initials Name Provider Type    Farhad Rosales, PT Student PT Student                   Clinical Impression       Row Name 04/10/25 1214          Pain    Pretreatment Pain Rating 0/10 - no pain (P)   -     Posttreatment Pain Rating 0/10 - no pain (P)   -       Row Name 04/10/25 1214          Plan of Care Review    Plan of Care Reviewed With patient (P)   -     Progress no change (P)   -     Outcome Evaluation PT evaluation complete. Pt received supine in bed with HR 62 and oxygen sat 98% on 3L with CPAP. Pt is oriented x 4 and denies any pain at rest. Pt reports living at [facility] and requires min A with  mobility and most ADL's. Pt uses a rollator at baseline. Pt does not ascend/descend stairs at baseline except during fire drills where he must be able to descend 1 floor. MMT in supine revealed B LE 3+/5 strength. Pt agreeable to perform bed exercises this session, but requested to defer EOB and OOB activity. Pt was able to roll L and R with SBA and scoot towards HOB with SBA. Pt performed exercises as indicated in flowsheets. Pt will benefit from skilled PT to improve functional activity tolerance, return to [facility], and reduce fall risk. Pt will benefit from potential return to [facility]. (P)   -       Row Name 04/10/25 1214          Therapy Assessment/Plan (PT)    Patient/Family Therapy Goals Statement (PT) Pt wants to return home. (P)   -     Rehab Potential (PT) fair (P)   -     Criteria for Skilled Interventions Met (PT) yes;skilled treatment is necessary;meets criteria (P)   -     Therapy Frequency (PT) 5 times/wk (P)   -       Row Name 04/10/25 1214          Vital Signs    Pretreatment Heart Rate (beats/min) 62 (P)   -     Posttreatment Heart Rate (beats/min) 62 (P)   -     Pre SpO2 (%) 98 (P)   -     O2 Delivery Pre  Treatment other (see comments) (P)   3L CPAP  -     Post SpO2 (%) 99 (P)   -     O2 Delivery Post Treatment other (see comments) (P)   3L CPAP  -     Pre Patient Position Supine (P)   -JH     Post Patient Position Supine (P)   -       Row Name 04/10/25 1214          Positioning and Restraints    Pre-Treatment Position in bed (P)   -JH     Post Treatment Position bed (P)   -JH     In Bed notified nsg;supine;call light within reach;encouraged to call for assist (P)   -               User Key  (r) = Recorded By, (t) = Taken By, (c) = Cosigned By      Initials Name Provider Type    Farhad Rosales, PT Student PT Student                   Outcome Measures       Row Name 04/10/25 1231 04/10/25 0800       How much help from another person do you currently need...    Turning from your back to your side while in flat bed without using bedrails? 4 (P)   - 3  -HS    Moving from lying on back to sitting on the side of a flat bed without bedrails? 4 (P)   - 3  -HS    Moving to and from a bed to a chair (including a wheelchair)? 2 (P)   - 3  -HS    Standing up from a chair using your arms (e.g., wheelchair, bedside chair)? 2 (P)   - 3  -HS    Climbing 3-5 steps with a railing? 2 (P)   - 3  -HS    To walk in hospital room? 2 (P)   - 3  -HS    AM-PAC 6 Clicks Score (PT) 16 (P)   - 18  -HS    Highest Level of Mobility Goal 5 --> Static standing (P)   - 6 --> Walk 10 steps or more  -      Row Name 04/10/25 1231          Functional Assessment    Outcome Measure Options AM-PAC 6 Clicks Basic Mobility (PT) (P)   -               User Key  (r) = Recorded By, (t) = Taken By, (c) = Cosigned By      Initials Name Provider Type    Lizzie Conte, RN Registered Nurse    Farhad Rosales, PT Student PT Student                                 Physical Therapy Education       Title: PT OT SLP Therapies (In Progress)       Topic: Physical Therapy (In Progress)       Point: Mobility training (Not Started)       Learner  Progress:  Not documented in this visit.              Point: Home exercise program (Done)       Learning Progress Summary            Patient Acceptance, E,TB, VU by  at 4/10/2025 1231    Comment: Pt educated in HEP and PT POC.                      Point: Body mechanics (Not Started)       Learner Progress:  Not documented in this visit.              Point: Precautions (Not Started)       Learner Progress:  Not documented in this visit.                              User Key       Initials Effective Dates Name Provider Type Discipline     01/13/25 -  Head, Farhad, PT Student PT Student PT                  PT Recommendation and Plan  Planned Therapy Interventions (PT): (P) balance training, bed mobility training, gait training, home exercise program, motor coordination training, neuromuscular re-education, patient/family education, postural re-education, transfer training, strengthening  Progress: (P) no change  Outcome Evaluation: (P) PT evaluation complete. Pt received supine in bed with HR 62 and oxygen sat 98% on 3L with CPAP. Pt is oriented x 4 and denies any pain at rest. Pt reports living at [facility] and requires min A with HH mobility and most ADL's. Pt uses a rollator at baseline. Pt does not ascend/descend stairs at baseline except during fire drills where he must be able to descend 1 floor. MMT in supine revealed B LE 3+/5 strength. Pt agreeable to perform bed exercises this session, but requested to defer EOB and OOB activity. Pt was able to roll L and R with SBA and scoot towards HOB with SBA. Pt performed exercises as indicated in flowsheets. Pt will benefit from skilled PT to improve functional activity tolerance, return to [facility], and reduce fall risk. Pt will benefit from potential return to [facility].     Time Calculation:   PT Evaluation Complexity  History, PT Evaluation Complexity: (P) 1-2 personal factors and/or comorbidities  Examination of Body Systems (PT Eval Complexity): (P) total  of 3 or more elements  Clinical Presentation (PT Evaluation Complexity): (P) evolving  Clinical Decision Making (PT Evaluation Complexity): (P) moderate complexity  Overall Complexity (PT Evaluation Complexity): (P) moderate complexity     PT Charges       Row Name 04/10/25 1000             Time Calculation    Start Time 1000 (P)   -      PT Received On 04/10/25 (P)   -      PT Goal Re-Cert Due Date 04/20/25 (P)   -         Untimed Charges    PT Eval/Re-eval Minutes 45 (P)   -         Total Minutes    Untimed Charges Total Minutes 45 (P)   -       Total Minutes 45 (P)   -                User Key  (r) = Recorded By, (t) = Taken By, (c) = Cosigned By      Initials Name Provider Type     Farhad Kaur, PT Student PT Student                  Therapy Charges for Today       Code Description Service Date Service Provider Modifiers Qty    [id number] HC PT EVAL MOD COMPLEXITY 3 4/10/2025 Farhad Kaur, PT Student GP 1            PT G-Codes  Outcome Measure Options: (P) AM-PAC 6 Clicks Basic Mobility (PT)  AM-PAC 6 Clicks Score (PT): (P) 16  PT Discharge Summary  Anticipated Discharge Disposition (PT): (P) assisted living    Farhad Kaur, PT Student  4/10/2025

## 2025-04-10 NOTE — THERAPY EVALUATION
Per PT, patient currently deferring out of bed mobility. OT will follow up as patient is appropriate and agreeable to mobilize. Thank you.

## 2025-04-10 NOTE — CASE MANAGEMENT/SOCIAL WORK
Case Management/Social Work    Patient Name:  Blane Deitz  YOB: 1935  MRN: [id number]  Admit Date:  4/7/2025        11:31 EDT   Discharge Plan       Row Name 04/10/25 1130       Plan    Plan dcp tbd by clinical cours, possible hospice                        Electronically signed by:  Gina Molina RN  04/10/25 11:31 EDT

## 2025-04-10 NOTE — PLAN OF CARE
Goal Outcome Evaluation:  Plan of Care Reviewed With: patient        Progress: improving  Outcome Evaluation: Patient alert and oriented, reports of sore perineum/buttocks that is improved with repositioning, sinus bradycardia to sinus rhythm, O2 > 92% on 3 L with home CPAP, phenylephrine drip stopped during this shift, MAP > 65, and sleeping in-between care the majority of the shift.

## 2025-04-10 NOTE — PLAN OF CARE
Goal Outcome Evaluation:  Plan of Care Reviewed With: (P) patient        Progress: (P) no change  Outcome Evaluation: (P) PT evaluation complete. Pt received supine in bed with HR 62 and oxygen sat 98% on 3L with CPAP. Pt is oriented x 4 and denies any pain at rest. Pt reports living at [facility] and requires min A with HH mobility and most ADL's. Pt uses a rollator at baseline. Pt does not ascend/descend stairs at baseline except during fire drills where he must be able to descend 1 floor. MMT in supine revealed B LE 3+/5 strength. Pt agreeable to perform bed exercises this session, but requested to defer EOB and OOB activity. Pt was able to roll L and R with SBA and scoot towards HOB with SBA. Pt performed exercises as indicated in flowsheets. Pt will benefit from skilled PT to improve functional activity tolerance, return to PLOF, and reduce fall risk. Pt will benefit from potential return to [facility].    Anticipated Discharge Disposition (PT): (P) assisted living

## 2025-04-10 NOTE — PLAN OF CARE
"Goal Outcome Evaluation:         Palliative Care Nurse visited pt this afternoon.  She spoke with pt's primary nurse, Felipa CARRILLO , prior to visit.  She reported him being tired today and sleeping a lot.  Documentation revealed PT/OT visited this morning.  Pt participated but wanted to remain in bed.  Pt remains in \"Enhanced Respiratory Isolation\"    Pt appeared to be sleeping but awakened with light verbal stimuli.  He was wearing his CPAP.  He asked what his O2 level was.  Informed him his saturation was 97% and he was on 3 L.  His response was \"Good\".  I inquired if he was having trouble breathing.  His reply was \"not really\".      I reminded him I was with the Palliative Care Team and Jimena was out of town today and tomorrow.     I asked about any pain, he reported his \"bottom\" was sore and they has been putting cream on it.  I asked if he needed that now but he declined. He repositioned himself.     I told him he seemed to be tired.  His response was, 'I don't think I'm long for this world\"  I recognized with him at this point in his life he will probably have \"good\" and have some \"not so good\" days.  He agreed.      I related knowing he had things to get done with \"rental property\" and wanted to get that done before a certain time.  He added his daughter was moving things from his appartment .  He indicated not knowing for sure his DCP     Informed him I will call and update his daughter on how he is doing this afternoon. He  said he will be talking with her this evening and has a \"list\" of things for her.     He denied any needs at this time.  Informed him I would update him on my conversation with his daughter later this afternoon.                                      "

## 2025-04-10 NOTE — PLAN OF CARE
"Goal Outcome Evaluation:      Telephoned pt's daughter, Susana Dietz [phone], to update her on my visit with her father.      She reported knowing they had stopped his BP medication drip (Jr-syndphrine).  I informed her his BP at 1200 was 115/64 with HR 61.  He was wearing his CPAP with O2 and his O2 sat was 98%.      I informed her that he seemed more tired today but PT had worked with him some.  He informed me of being worried about his \"rental property\".  She responded that she is handling it.  She added that she will be moving him to an apt on the lower level at [facility] but is not certain if he will be able to go back there.    I told her he had mentioned Hospice but I wasn't sure if [facility] allows Hospice in.  She wasn't sure but said 'they don't seem to make sure pt's take their medications\".    She reported she will be calling or visiting this evening.      I did inquired if pt received an VA benefits and if so he may be eligible for LTC assistance should he need it.   She reported pt has always said he felt he was not eligible for VA benefits.      Informed her I will call again tomorrow with update.    1610  Visited pt again after speaking with his daughter let him know that I had.  Pt was alert and talkative.  Family members in the room.  He had O2 per NC in place.    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  He seemed happy and feeling better.    I did update daughter after the visit that her father was much better this afternoon than this morning.  She responded that he does that a lot.  He stays up late at night.   She appreciated the update.                                      "

## 2025-04-11 LAB
ALBUMIN SERPL-MCNC: 3.6 G/DL (ref 3.5–5.2)
ALBUMIN/GLOB SERPL: 1.4 G/DL
ALP SERPL-CCNC: 148 U/L (ref 39–117)
ALT SERPL W P-5'-P-CCNC: 47 U/L (ref 1–41)
ANION GAP SERPL CALCULATED.3IONS-SCNC: 14.8 MMOL/L (ref 5–15)
AST SERPL-CCNC: 36 U/L (ref 1–40)
BILIRUB SERPL-MCNC: 0.9 MG/DL (ref 0–1.2)
BUN SERPL-MCNC: 88 MG/DL (ref 8–23)
BUN/CREAT SERPL: 33.7 (ref 7–25)
CALCIUM SPEC-SCNC: 7.2 MG/DL (ref 8.6–10.5)
CHLORIDE SERPL-SCNC: 100 MMOL/L (ref 98–107)
CO2 SERPL-SCNC: 20.2 MMOL/L (ref 22–29)
CREAT SERPL-MCNC: 2.61 MG/DL (ref 0.76–1.27)
DEPRECATED RDW RBC AUTO: 51.2 FL (ref 37–54)
EGFRCR SERPLBLD CKD-EPI 2021: 23 ML/MIN/1.73
ERYTHROCYTE [DISTWIDTH] IN BLOOD BY AUTOMATED COUNT: 16.4 % (ref 12.3–15.4)
GLOBULIN UR ELPH-MCNC: 2.6 GM/DL
GLUCOSE BLDC GLUCOMTR-MCNC: 113 MG/DL (ref 70–130)
GLUCOSE BLDC GLUCOMTR-MCNC: 129 MG/DL (ref 70–130)
GLUCOSE BLDC GLUCOMTR-MCNC: 145 MG/DL (ref 70–130)
GLUCOSE BLDC GLUCOMTR-MCNC: 202 MG/DL (ref 70–130)
GLUCOSE SERPL-MCNC: 109 MG/DL (ref 65–99)
HCT VFR BLD AUTO: 35.5 % (ref 37.5–51)
HGB BLD-MCNC: 11.7 G/DL (ref 13–17.7)
MCH RBC QN AUTO: 29.4 PG (ref 26.6–33)
MCHC RBC AUTO-ENTMCNC: 33 G/DL (ref 31.5–35.7)
MCV RBC AUTO: 89.2 FL (ref 79–97)
PLATELET # BLD AUTO: 97 10*3/MM3 (ref 140–450)
PMV BLD AUTO: 10.7 FL (ref 6–12)
POTASSIUM SERPL-SCNC: 4 MMOL/L (ref 3.5–5.2)
PROT SERPL-MCNC: 6.2 G/DL (ref 6–8.5)
RBC # BLD AUTO: 3.98 10*6/MM3 (ref 4.14–5.8)
SODIUM SERPL-SCNC: 135 MMOL/L (ref 136–145)
WBC NRBC COR # BLD AUTO: 5.95 10*3/MM3 (ref 3.4–10.8)

## 2025-04-11 PROCEDURE — 99232 SBSQ HOSP IP/OBS MODERATE 35: CPT | Performed by: FAMILY MEDICINE

## 2025-04-11 PROCEDURE — 25010000002 FUROSEMIDE PER 20 MG: Performed by: INTERNAL MEDICINE

## 2025-04-11 PROCEDURE — 85027 COMPLETE CBC AUTOMATED: CPT | Performed by: FAMILY MEDICINE

## 2025-04-11 PROCEDURE — 25010000002 CEFEPIME PER 500 MG: Performed by: FAMILY MEDICINE

## 2025-04-11 PROCEDURE — 82948 REAGENT STRIP/BLOOD GLUCOSE: CPT | Performed by: STUDENT IN AN ORGANIZED HEALTH CARE EDUCATION/TRAINING PROGRAM

## 2025-04-11 PROCEDURE — 63710000001 INSULIN LISPRO (HUMAN) PER 5 UNITS: Performed by: FAMILY MEDICINE

## 2025-04-11 PROCEDURE — 97530 THERAPEUTIC ACTIVITIES: CPT

## 2025-04-11 PROCEDURE — 80053 COMPREHEN METABOLIC PANEL: CPT | Performed by: FAMILY MEDICINE

## 2025-04-11 PROCEDURE — 97116 GAIT TRAINING THERAPY: CPT

## 2025-04-11 PROCEDURE — 97166 OT EVAL MOD COMPLEX 45 MIN: CPT

## 2025-04-11 PROCEDURE — 82948 REAGENT STRIP/BLOOD GLUCOSE: CPT

## 2025-04-11 RX ORDER — FUROSEMIDE 10 MG/ML
40 INJECTION INTRAMUSCULAR; INTRAVENOUS ONCE
Status: COMPLETED | OUTPATIENT
Start: 2025-04-11 | End: 2025-04-11

## 2025-04-11 RX ADMIN — FUROSEMIDE 40 MG: 10 INJECTION, SOLUTION INTRAMUSCULAR; INTRAVENOUS at 12:30

## 2025-04-11 RX ADMIN — MIDODRINE HYDROCHLORIDE 5 MG: 5 TABLET ORAL at 08:08

## 2025-04-11 RX ADMIN — INSULIN LISPRO 4 UNITS: 100 INJECTION, SOLUTION INTRAVENOUS; SUBCUTANEOUS at 22:14

## 2025-04-11 RX ADMIN — CEFEPIME 2000 MG: 2 INJECTION, POWDER, FOR SOLUTION INTRAVENOUS at 19:55

## 2025-04-11 RX ADMIN — MUPIROCIN 1 APPLICATION: 20 OINTMENT TOPICAL at 08:09

## 2025-04-11 RX ADMIN — APIXABAN 2.5 MG: 2.5 TABLET, FILM COATED ORAL at 22:13

## 2025-04-11 RX ADMIN — HYDROXYZINE PAMOATE 50 MG: 25 CAPSULE ORAL at 08:39

## 2025-04-11 RX ADMIN — Medication 10 ML: at 22:14

## 2025-04-11 RX ADMIN — Medication 10 ML: at 08:13

## 2025-04-11 RX ADMIN — MUPIROCIN 1 APPLICATION: 20 OINTMENT TOPICAL at 22:14

## 2025-04-11 RX ADMIN — HYDROXYZINE PAMOATE 50 MG: 25 CAPSULE ORAL at 16:58

## 2025-04-11 RX ADMIN — AMIODARONE HYDROCHLORIDE 200 MG: 200 TABLET ORAL at 08:08

## 2025-04-11 RX ADMIN — APIXABAN 2.5 MG: 2.5 TABLET, FILM COATED ORAL at 08:08

## 2025-04-11 NOTE — NURSING NOTE
Patient downgraded to telemetry, VSS on 2LNC or home CPAP. Lung sounds diminished/fine crackles. Up to BSC a couple times and chair. Transferring to tele, room 304. No complaints at this time. A/O x 3, forgetful.

## 2025-04-11 NOTE — PLAN OF CARE
Goal Outcome Evaluation:  Plan of Care Reviewed With: (P) patient        Progress: (P) improving  Outcome Evaluation: (P) VSS- PO amiodarone for Afib RVR. Tx from ICU today to floor and tolerating 2L NC well. He occasionally rotates supplemental o2 with CPAP. Up x1-2 with walker. Experiencing mild agitation/anxiety - treating with PRN Vistaril.

## 2025-04-11 NOTE — PLAN OF CARE
"  Palliative Care RN visited pt briefly this am.  Pt was getting up to chair   Alert with O2 per NC in place  Informed him I would visit again late.    1230  Visited pt again.  Pt sitting up in chair with O2 per NC in place at 3L  Pt had lunch tray in front of him.  He had only eating a few bites and did not want the rest. He kept the fruit \"for later\".      Pt reported needing \"more air\".  He wanted his CPAP.  Pt did not appear to be in resp  distress.  Assisted with CPAP.  Pt not as talkative as he was yesterday afternoon.  He wanted to take a nap.  He was still up in chair.      PC will continue to follow.   HCP provided with name and demographics but to place on \"Hold\" until DCP decision confirmed.  "

## 2025-04-11 NOTE — PLAN OF CARE
Goal Outcome Evaluation:  Plan of Care Reviewed With: patient        Progress: improving  Outcome Evaluation: Patient alert and oriented, forgetful at times, sinus bradycardia to sinus rhythm, O2 > 92% on 4 L with home CPAP, MAP > 65, and sleeping in-between care the majority of the shift.

## 2025-04-11 NOTE — THERAPY TREATMENT NOTE
Patient Name: Blane Dietz  : 1935    MRN: [id number]                              Today's Date: 2025       Admit Date: 2025    Visit Dx:     ICD-10-CM ICD-9-CM   1. COVID-19  U07.1 079.89   2. Shock  R57.9 785.50   3. Atrial fibrillation with rapid ventricular response  I48.91 427.31     Patient Active Problem List   Diagnosis    Essential hypertension    Type 2 diabetes mellitus with hyperglycemia, with long-term current use of insulin    Prostate cancer    Aortic stenosis    Sleep apnea    S/P AVR    HLD (hyperlipidemia)    Renal mass    s/p right nepheroureterectomy and adrenalectomy     Diffuse large B-cell lymphoma of solid organ excluding spleen    PICC (peripherally inserted central catheter) flush    History of pulmonary embolism    History of malignant neoplasm of prostate    Lymphoma of kidney    Stage 3b chronic kidney disease    Pneumonia due to COVID-19 virus    Abrasion    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)    Valvular heart disease    Atrial arrhythmia    RBBB    Atrial fibrillation with rapid ventricular response    Acute HFrEF (heart failure with reduced ejection fraction)    COVID-19     Past Medical History:   Diagnosis Date    Aortic stenosis     status post ROSS procedure, remote, with 2015 echocardiography revealing normal aortic and pulmonary valve function, normal ejection fraction and mild to moderate MR    Arthritis     Bilateral impacted cerumen 2016    Bronchitis     CHF (congestive heart failure)     Chronic gout of right foot 2016    Impression: 2016 - stable.;     COVID-19 vaccine series completed 2021    Maderna 2nd dose 3/12/21.    Depression     Diabetes mellitus     Diverticulitis     Exogenous obesity     Gout     Heart murmur     History of vitamin D deficiency     Hypercholesteremia 2016    Hyperlipidemia     Hypertension     Impaired mobility     Kidney lesion     Malignant neoplasm of  prostate     Morbid obesity 08/11/2016    Pneumonia 05/12/2021    Primary osteoarthritis involving multiple joints 06/13/2016    Impression: 03/08/2016 - stable;     Pulmonary embolism     Sleep apnea 05/12/2021    C-Pap    Uncontrolled type 2 diabetes mellitus with hyperglycemia 06/13/2016    Impression: 03/08/2016 - seeing Endo .;     Vertigo      Past Surgical History:   Procedure Laterality Date    CARDIAC VALVE REPLACEMENT  05/12/2021    Ross procedure 22 years ago    COLON SURGERY      COLONOSCOPY      COLOSTOMY  1999    COLOSTOMY REVISION      CYSTOSCOPY N/A 12/7/2021    Procedure: CYSTOSCOPY FLEXIBLE;  Surgeon: José Miguel Villafuerte Jr., MD;  Location:  [facility];  Service: Urology;  Laterality: N/A;    EXPLORATORY LAPAROTOMY      With Tissue Removal    LIPOMA EXCISION      MOHS SURGERY      NEPHROURETERECTOMY Right 12/7/2021    Procedure: RIGHT NEPHROURETERECTOMY LAPAROSCOPIC WITH DAVINCI ROBOT;  Surgeon: José Miguel Villafuerte Jr., MD;  Location:  [facility];  Service: Robotics - DaVinci;  Laterality: Right;    OTHER SURGICAL HISTORY      Porcine Valve    PROSTATE SURGERY      PROSTATECTOMY  2000     History of Prostatectomy Perineal Radical    SKIN CANCER EXCISION      from head and lip    TONSILLECTOMY        General Information       [facility] [place]11/25 1416          Physical Therapy Time and Intention    Document Type therapy note (daily note)  -     Mode of Treatment physical therapy  -       Row Name 04/11/25 1416          General Information    Patient Profile Reviewed yes  -     Existing Precautions/Restrictions fall  -       Row Name 04/11/25 1416          Cognition    Orientation Status (Cognition) oriented x 4  -[facility] Name 04/11/25 1416          Safety Issues/Impairments Affecting Functional Mobility    Impairments Affecting Function (Mobility) endurance/activity tolerance;postural/trunk control;strength  -               User Key  (r) = Recorded By, (t) = Taken By, (c) = Cosigned By       Initials Name Provider Type    Cecil Delatorre, PT Physical Therapist                   Mobility       Row Name 04/11/25 1417          Bed Mobility    Bed Mobility supine-sit  -     Supine-Sit Whitman (Bed Mobility) minimum assist (75% patient effort)  -     Assistive Device (Bed Mobility) bed rails;head of bed elevated  -       Row Name 04/11/25 1417          Bed-Chair Transfer    Bed-Chair Whitman (Transfers) minimum assist (75% patient effort);moderate assist (50% patient effort)  -     Assistive Device (Bed-Chair Transfers) walker, front-wheeled  -       Row Name 04/11/25 1417          Sit-Stand Transfer    Sit-Stand Whitman (Transfers) minimum assist (75% patient effort)  -     Assistive Device (Sit-Stand Transfers) walker, front-wheeled  -[facility] Name 04/11/25 1417          Gait/Stairs (Locomotion)    Whitman Level (Gait) not tested  -     Patient was able to Ambulate yes  -     Distance in Feet (Gait) 3  -MK     Deviations/Abnormal Patterns (Gait) festinating/shuffling;gait speed decreased;base of support, narrow  -MK     Bilateral Gait Deviations forward flexed posture  -               User Key  (r) = Recorded By, (t) = Taken By, (c) = Cosigned By      Initials Name Provider Type    Cecil Delatorre PT Physical Therapist                   Obj/Interventions       [facility] Name 04/11/25 1417          Balance    Balance Assessment sitting static balance;sitting dynamic balance;standing static balance;standing dynamic balance  -     Static Sitting Balance standby assist  -     Dynamic Sitting Balance contact guard  -     Position, Sitting Balance unsupported;sitting edge of bed  -     Static Standing Balance minimal assist  -     Dynamic Standing Balance minimal assist;moderate assist  -     Position/Device Used, Standing Balance walker, front-wheeled  -     Balance Interventions sitting;standing;sit to stand  -               User Key  (r) = Recorded  By, (t) = Taken By, (c) = Cosigned By      Initials Name Provider Type    Cecil Delatorre, PT Physical Therapist                   Goals/Plan    No documentation.                  Clinical Impression       Row Name 04/11/25 1418          Pain    Pretreatment Pain Rating 0/10 - no pain  -     Posttreatment Pain Rating 0/10 - no pain  -       Row Name 04/11/25 1418          Plan of Care Review    Plan of Care Reviewed With patient  -MK     Progress improving  -     Outcome Evaluation pt participated in PT tx this date. Pt came from supine to sitting min A in order to perform standing urination. Pt required min A to continue standing. Pt transfered to bedside commode min-mod A using RW with max verbal cues. pt is very fearful of falling and often asked about his vitals. Pt had a BM, requiring SPV and max A for hygiene. Pt ambulated to bedside pato and was left seated with all needs met. Pt tolerated session with supplemental O2 doffed for majority of session with O2 sats remaining above 94%. pt progressing well, cont per POC  -MK       Row Name 04/11/25 1418          Vital Signs    Pre SpO2 (%) 95  -MK     O2 Delivery Pre Treatment other (see comments)  CPAP  -MK     Intra SpO2 (%) 95  -MK     O2 Delivery Intra Treatment room air  -MK     Post SpO2 (%) 96  -MK     O2 Delivery Post Treatment supplemental O2  3 L  -MK     Pre Patient Position Supine  -MK     Intra Patient Position Standing  -MK     Post Patient Position Sitting  -       Row Name 04/11/25 1418          Positioning and Restraints    Pre-Treatment Position in bed  -MK     Post Treatment Position chair  -MK     In Chair notified nsg;reclined;call light within reach;encouraged to call for assist;exit alarm on  -               User Key  (r) = Recorded By, (t) = Taken By, (c) = Cosigned By      Initials Name Provider Type    Cecil Delatorre, PT Physical Therapist                   Outcome Measures       Row Name 04/11/25 1422 04/11/25 0800       How  much help from another person do you currently need...    Turning from your back to your side while in flat bed without using bedrails? 3  - 3  -HS    Moving from lying on back to sitting on the side of a flat bed without bedrails? 3  - 3  -HS    Moving to and from a bed to a chair (including a wheelchair)? 2  - 2  -HS    Standing up from a chair using your arms (e.g., wheelchair, bedside chair)? 2  - 2  -HS    Climbing 3-5 steps with a railing? 2  -MK 2  -HS    To walk in hospital room? 2  -MK 2  -HS    AM-PAC 6 Clicks Score (PT) 14  - 14  -HS    Highest Level of Mobility Goal 4 --> Transfer to chair/commode  -MK 4 --> Transfer to chair/commode  -HS      Row Name 04/11/25 1422 04/11/25 1308       Functional Assessment    Outcome Measure Options AM-PAC 6 Clicks Basic Mobility (PT)  - AM-PAC 6 Clicks Daily Activity (OT)  -CONI              User Key  (r) = Recorded By, (t) = Taken By, (c) = Cosigned By      Initials Name Provider Type    HS Lizzie Leal, RN Registered Nurse    Cecil Delatorre, PT Physical Therapist    Olimpia Hernandez, OT Occupational Therapist                                 Physical Therapy Education       Title: PT OT SLP Therapies (In Progress)       Topic: Physical Therapy (In Progress)       Point: Mobility training (Done)       Learning Progress Summary            Patient Acceptance, E,D, DU,VU by  at 4/11/2025 1423    Comment: educated on importance of mobility and purpose of PT while admitted                      Point: Home exercise program (Done)       Learning Progress Summary            Patient Acceptance, E,TB, VU by  at 4/10/2025 1231    Comment: Pt educated in HEP and PT POC.                      Point: Body mechanics (Done)       Learning Progress Summary            Patient Acceptance, E,D, DU,VU by  at 4/11/2025 1423    Comment: educated on importance of mobility and purpose of PT while admitted                      Point: Precautions (Not Started)        Learner Progress:  Not documented in this visit.                              User Key       Initials Effective Dates Name Provider Type Discipline     06/13/23 -  Cecil Oshea, AVINASH Physical Therapist PT     01/13/25 -  Farhad Kaur PT Student PT Student PT                  PT Recommendation and Plan     Progress: improving  Outcome Evaluation: pt participated in PT tx this date. Pt came from supine to sitting min A in order to perform standing urination. Pt required min A to continue standing. Pt transfered to bedside commode min-mod A using RW with max verbal cues. pt is very fearful of falling and often asked about his vitals. Pt had a BM, requiring SPV and max A for hygiene. Pt ambulated to bedside pato and was left seated with all needs met. Pt tolerated session with supplemental O2 doffed for majority of session with O2 sats remaining above 94%. pt progressing well, cont per POC     Time Calculation:         PT Charges       Row Name 04/11/25 1030             Time Calculation    Start Time 1030  -MK      Stop Time 1109  -MK      Time Calculation (min) 39 min  -MK      PT Received On 04/11/25  -MK      PT Goal Re-Cert Due Date 04/20/25  -MK         Timed Charges    95910 - Gait Training Minutes  15  -MK      65739 - PT Therapeutic Activity Minutes 24  -MK         Total Minutes    Timed Charges Total Minutes 39  -MK       Total Minutes 39  -MK                User Key  (r) = Recorded By, (t) = Taken By, (c) = Cosigned By      Initials Name Provider Type     Cecil Oshea PT Physical Therapist                  Therapy Charges for Today       Code Description Service Date Service Provider Modifiers Qty    87053032835 HC GAIT TRAINING EA 15 MIN 4/11/2025 Cecil Oshea, PT GP 1    49307356206 HC PT THERAPEUTIC ACT EA 15 MIN 4/11/2025 Cecil Oshea, PT GP 2            PT G-Codes  Outcome Measure Options: AM-PAC 6 Clicks Basic Mobility (PT)  AM-PAC 6 Clicks Score (PT): 14  AM-PAC 6 Clicks Score (OT): 16        Cecil Oshea, PT  4/11/2025

## 2025-04-11 NOTE — PLAN OF CARE
Goal Outcome Evaluation:  Plan of Care Reviewed With: patient        Progress: no change  Outcome Evaluation: Pt participated in initial OT evaluation this date. Pt required min A and cues to sequence transition to edge of bed to perform standing urination. Pt required min A to stand from edge of bed with cues for hand placement with RW. Pt required CGA in standing to urinate into urinal, pt able to hold urinal but required min A for balance without UE support on RW. Pt able to maintain static stance with unilateral support on RW. Pt reporting need for BM and required mod A and RW to take side steps to BSC. Pt with posterior loss of balance and decreased safety awareness with RW during transfer. Pt reporting a fear of falling due to weakness. Pt required supervision and significantly increased time on BSC to have a BM. Pt on RA for portion of toileting and sats >95% throughout. Pt required min A to stand from BSC and max A to complete posterior hygiene due to impaired activity tolerance and dynamic balance. Pt challenged to walk several steps to bedside recliner with min A and RW required for safety. Pt unable to walk in hallway due to isolation precautions.  Pt noted to have impaired ability to sequence self-care tasks and attention to task. Pt tolerated session with fair (-) energy for task becoming fatigued with basic ADL routine. Pt educated on discharge recommendations, mobility expectations, and role of OT while inpatient. Continue OT POC.    Anticipated Discharge Disposition (OT): sub acute care setting

## 2025-04-11 NOTE — PROGRESS NOTES
[facility]    PROGRESS NOTE    Name:  Blane Dietz   Age:  89 y.o.  Sex:  male  :  1935  MRN:  [id number]   Visit Number:  [id number]  Admission Date:  2025  Date Of Service:  25  Primary Care Physician:  David Em MD     LOS: 4 days :    Chief Complaint:      Shortness of air, N/V/D     Subjective:    Patient was seen examined bedside today.  Patient sitting up comfortably in the chair.  Patient reports overall improvement, still has mild shortness of breath but improving slowly on a daily basis.  Denies any pain or discomfort.  Reports diarrhea resolved.  Blood pressure stable on midodrine, he is off IV pressors.  He is on 2 L nasal cannula, afebrile.  Denies any other acute complaints today.    Hospital Course:    Blane Dietz is an 89-year-old man with past medical history of heart failure reduced ejection fraction, moderate AR, moderate MR, type 2 diabetes, cirrhosis, hypertension, prostate cancer, aortic stenosis status post AVR, HUSSAIN on CPAP, hyperlipidemia, history of diffuse large B-cell lymphoma of kidney, CKD, atrial fibrillation.  Presented to [facility] ED on 2025 with concern for several weeks of various degrees of diarrhea, shortness of air with cough.  Family reports the patient actually has been off of this medication for several weeks.  Denied any chest pain, abdominal pain.  Says that he does feel somewhat short of air.  Denied any fevers or chills.     ED summary: Afebrile, atrial fibrillation with RVR rate as high as 160 resolved with cardioversion, hypotension resolved with fluids and Levophed, vital signs stable on 2 L baseline.  EKG initially atrial fibrillation with RVR rate 161.  After resolution sinus rhythm with first-degree AV block, right bundle branch block.  Troponin monitoring 1, 41, ACS not suspected.  proBNP over 18,000.  Sodium 131, anion gap 13.5, BUN 95, creatinine 2.83, EGFR 20, blood glucose 246, calcium 8.0, magnesium 2.8,  Phos 6.4, , ALT 77, bilirubin 1.3.  Lactate 2.7, procalcitonin 0.25, no leukocytosis, hemoglobin 12.2.  Blood cultures.  COVID-19 positive.  CT angio chest bilateral pleural effusions, no acute PE, 4.6 cm diameter dilated ascending aorta.  CT ab/pelvis findings suggesting decompensated cirrhotic portal hypertension, edematous gallbladder likely secondary to third spacing, thick-walled descending colon and distal rectum indeterminant.  He was provided IV amiodarone, vancomycin and cefepime, etomidate, Xylocaine, 2 L bolus.  Started on Levophed.  ED physician plans on placing central line.    Review of Systems:     All systems were reviewed and negative except as mentioned in subjective, assessment and plan.    Vital Signs:    Temp:  [97.2 °F (36.2 °C)-97.8 °F (36.6 °C)] 97.2 °F (36.2 °C)  Heart Rate:  [52-80] 70  Resp:  [12-18] 12  BP: ()/(46-84) 101/57    Intake and output:    I/O last 3 completed shifts:  In: 1119.2 [P.O.:477; I.V.:542.2; IV Piggyback:100]  Out: -   I/O this shift:  In: 240 [P.O.:240]  Out: -     Physical Examination:    General Appearance:  Alert and cooperative.  Chronically ill-appearing.  No acute distress.   Head:  Atraumatic and normocephalic.   Eyes: Conjunctivae and sclerae normal, no icterus. No pallor.   Throat: No oral lesions, no thrush, oral mucosa moist.   Neck: Supple, trachea midline, no thyromegaly.   Lungs:   Breath sounds heard bilaterally equally.  No wheezing.  Bibasilar rhonchi heard.  Decreased air movement bilaterally.  No Pleural rub or bronchial breathing.  Unlabored.  On supplemental oxygen.   Heart:  Normal S1 and S2, no murmur, no gallop, no rub. No JVD.   Abdomen:   Normal bowel sounds, no masses, no organomegaly. Soft, nontender, nondistended, no rebound tenderness.   Extremities: Supple, no edema, no cyanosis, no clubbing.    Skin: No bleeding.  Superficial chronic leg wound, see photo    Neurologic: Alert and oriented x 3. No facial asymmetry. Moves all  "four limbs. No tremors.      Laboratory results:    Results from last 7 days   Lab Units 04/11/25  0414 04/10/25  0522 04/09/25  0506   SODIUM mmol/L 135* 135* 136   POTASSIUM mmol/L 4.0 3.8 4.5   CHLORIDE mmol/L 100 99 99   CO2 mmol/L 20.2* 19.2* 20.0*   BUN mg/dL 88* 91* 92*   CREATININE mg/dL 2.61* 2.67* 2.64*   CALCIUM mg/dL 7.2* 7.2* 7.5*   BILIRUBIN mg/dL 0.9 0.7 1.0   ALK PHOS U/L 148* 136* 151*   ALT (SGPT) U/L 47* 56* 72*   AST (SGOT) U/L 36 44* 67*   GLUCOSE mg/dL 109* 150* 120*     Results from last 7 days   Lab Units 04/11/25  0414 04/10/25  0522 04/09/25  0506   WBC 10*3/mm3 5.95 5.30 8.65   HEMOGLOBIN g/dL 11.7* 11.1* 12.1*   HEMATOCRIT % 35.5* 33.6* 36.4*   PLATELETS 10*3/mm3 97* 99* 152         Results from last 7 days   Lab Units 04/07/25 1910 04/07/25  1753   HSTROP T ng/L 41* 101*     Results from last 7 days   Lab Units 04/07/25 1916 04/07/25 1910 04/06/25  1330   BLOODCX  No growth at 3 days No growth at 3 days  --    URINECX   --   --  50,000 CFU/mL Mixed Sissy Isolated     No results for input(s): \"PHART\", \"MQK9UAI\", \"PO2ART\", \"USE0VMG\", \"BASEEXCESS\" in the last 8760 hours.   I have reviewed the patient's laboratory results.    Radiology results:    No radiology results from the last 24 hrs    I have reviewed the patient's radiology reports.    Medication Review:     I have reviewed the patient's active and prn medications.     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  Transaminitis  Hyperbilirubinemia  Chronic leg wound  AAA     Chronic: Heart failure reduced ejection fraction, moderate AR, moderate MR, type 2 diabetes, cirrhosis, hypertension, prostate cancer, aortic stenosis status post AVR, HUSSAIN on CPAP, hyperlipidemia, history of diffuse large B-cell lymphoma of kidney, CKD, " atrial fibrillation    Plan:    Inpatient ICU admission 4/7/2025 with atrial fibrillation with RVR status post cardioversion in ED, hypotension likely multifactorial requiring Levophed, elevated troponins ACS not suspected likely demand ischemia, COVID-19 infection on his baseline 2 L, acute exacerbation of heart failure reduced ejection fraction, hyponatremia, 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     Atrial fibrillation with RVR, resolved  -Amiodarone IV.  -Cardiology consultation, recommendations appreciated.  -Status post cardioversion in the ED.  - Per cardiology commendations, continuing IV amiodarone for a total of 48-72 hours. Then can be changed to oral amiodarone therapy at 200 mg once per day.   - switched to p.o. amiodarone.    Elevated troponins  -ACS not suspected, likely demand ischemia.    Acute exacerbation of heart failure reduced ejection fraction  Bilateral pleural effusions.   -Previous 2D echo on file from December 2024 with EF of 38%  -Daily weight, strict ins and outs  -Diuretics per nephrology  -Cardiology consulted, appreciate recommendations    Hypotension/shock, improved  -Shock likely multifactorial given multiple chronic severe conditions, but could be related to sepsis  -Central line placed in ED, discontinued on 4/11  -switched Levophed to Jr-Synephrine in the settings of A-fib with RVR  -added midodrine   -Has been off Jr-Synephrine   - Transfer to telemetry, discontinue central line    Colitis, suspected  -Ct showed: Thick-walled ascending colon and distal rectum are indeterminate. Correlate with signs or symptoms of colitis.  - Initially started vancomycin and Zosyn on admission out of an abundance of caution.  -Blood cultures remains negative x 24 hours  -Unsure if hypotension has an element of septic shock, likely multifactorial given multiple chronic severe conditions.  -GI panel and C. Difficile both negative  - MRSA screen negative,  discontinue vancomycin, continue Zosyn  - WBC WNL, afebrile    COVID-19 infection  Chronic respiratory failure  -On his 2 L baseline.  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  -Avoid nephrotoxic drugs  -Diuretics per nephrology    Transaminitis  Hyperbilirubinemia  Cirrhosis  -Findings on imaging suggesting decompensated cirrhotic portal hypertension. Edematous gallbladder is likely secondary to 3rd spacing of fluid rather than cholecystitis.  -Right upper quadrant ultrasound ordered and pending  - Abnormal LFTs likely secondary to chronic cirrhosis.  - Monitor liver function  - Hold statin    Chronic leg wound  -Wound care.    AAA  -Ascending aorta 4.6 cm diameter. Similar to prior.   -Recommend follow-up.     Chronic: Heart failure reduced ejection fraction, moderate AR, moderate MR, type 2 diabetes, cirrhosis, hypertension, prostate cancer, aortic stenosis status post AVR, HUSSAIN on CPAP, hyperlipidemia, history of diffuse large B-cell lymphoma of kidney, CKD, atrial fibrillation  -Subcutaneous insulin protocol.  -Continue other home medications.     -I have reviewed the copied text and it is accurate as of 4/11/2025   - Will transfer patient to telemetry today, discontinue central line    -Palliative care consulted for further discussions on goals of care, patient does not want any escalation of care, he is agreeable for hospice at this point but still undecided if his going to do home with hospice versus hospice care center.  Will see how patient will progress over the next day or 2 and decide based on that.    DVT Prophylaxis: Eliquis  Code Status: DNR/DNI  Diet: Cardiac/renal  Discharge Plan: To be determined, likely needs 1 to 2 days in the hospital.    Deanne Bowles MD  04/11/25  09:39 EDT    Dictated utilizing Dragon dictation.

## 2025-04-11 NOTE — CASE MANAGEMENT/SOCIAL WORK
Case Management/Social Work    Patient Name:  Blane Dietz  YOB: 1935  MRN: [id number]  Admit Date:  4/7/2025      Maximus reached out to Herber Crump to discuss options for pt regarding Hospice, LTC needs etc. No answer. Maximus left a vm requesting a call back.       Electronically signed by:  LYNDSAY Saleh  04/11/25 11:03 EDT

## 2025-04-11 NOTE — PROGRESS NOTES
[facility] Progress Note  [facility]. [place]        Patient Name: Blane Dietz  : 1935  MRN: [id number]   LOS: 4 days    Patient Care Team:  David Em MD as PCP - General (Internal Medicine)  Ha Toussaint MD as Consulting Physician (Cardiology)  Shannan Ramon MD as Consulting Physician (Hematology and Oncology)  Xavier Boston MD as Consulting Physician (Endocrinology)  Brionna Weinstein DO as Surgeon (General Surgery)  Sophia Ferreira PA as Physician Assistant (Endocrinology)  Abbey Reyez APRN as Nurse Practitioner (Nurse Practitioner)    Chief Complaint:    Chief Complaint   Patient presents with    Shortness of Breath     Primary Care Physician:  David Em MD  Date of admission: 2025    Subjective     Interval History:   Follow-up acute on 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   Patient is awake alert and interactive denies having any chest pain shortness of breath, he has been off pressors since day before yesterday evening. Ongoing discussion with palliative care.  Events noted from last 24 hours.  I reviewed the chart and other providers notes, labs and procedures done since my last note.    Review of Systems:   As noted above.    Objective     Vitals:   Temp:  [97.2 °F (36.2 °C)-97.8 °F (36.6 °C)] 97.2 °F (36.2 °C)  Heart Rate:  [52-80] 68  Resp:  [12-18] 12  BP: ()/(46-84) 106/64  Flow (L/min) (Oxygen Therapy):  [2-6] 6    Intake/Output Summary (Last 24 hours) at 2025 1012  Last data filed at 2025 0800  Gross per 24 hour   Intake 340 ml   Output --   Net 340 ml       Physical Exam:    General Appearance: alert, oriented x 3, no acute distress   Skin: warm and dry  HEENT: oral mucosa normal, nonicteric sclera  Neck: supple, no JVD  Lungs: Bibasilar crackles.  Heart: IRRR,   Abdomen: obese, soft, nontender, non distended and positive bowel sounds.  : no palpable bladder  Extremities: Trace  edema, no cyanosis or clubbing  Neuro: normal speech and mental status     Scheduled Meds:     Current Facility-Administered Medications   Medication Dose Route Frequency Provider Last Rate Last Admin    amiodarone (PACERONE) tablet 200 mg  200 mg Oral Q24H Deanne Bowles MD   200 mg at 04/11/25 0808    apixaban (ELIQUIS) tablet 2.5 mg  2.5 mg Oral Q12H Kerley, Brian Joseph, DO   2.5 mg at 04/11/25 0808    benzocaine (AMERICAINE) 20 % rectal ointment   Rectal BID PRN Deanne Bowles MD   Given at 04/10/25 1607    cefepime 2000 mg IVPB in 100 mL NS (VTB)  2,000 mg Intravenous Q24H Kerley, Brian Joseph, DO   2,000 mg at 04/10/25 2108    dextrose (D50W) (25 g/50 mL) IV injection 25 g  25 g Intravenous Q15 Min PRN Kerley, Brian Joseph, DO        dextrose (GLUTOSE) oral gel 15 g  15 g Oral Q15 Min PRN Kerley, Brian Joseph, DO        glucagon (GLUCAGEN) injection 1 mg  1 mg Intramuscular Q15 Min PRN Kerley, Brian Joseph, DO        hydrOXYzine pamoate (VISTARIL) capsule 50 mg  50 mg Oral TID PRN Deanne Bowles MD   50 mg at 04/11/25 0839    Insulin Lispro (humaLOG) injection 2-9 Units  2-9 Units Subcutaneous 4x Daily AC & at Bedtime Kerley, Brian Joseph, DO   2 Units at 04/10/25 2110    metoprolol succinate XL (TOPROL-XL) 24 hr tablet 25 mg  25 mg Oral Daily Kerley, Brian Joseph, DO   25 mg at 04/09/25 1100    midodrine (PROAMATINE) tablet 5 mg  5 mg Oral TID AC Deanne Bowles MD   5 mg at 04/11/25 0808    mupirocin (BACTROBAN) 2 % nasal ointment 1 Application  1 Application Each Nare BID Deanne Bowles MD   1 Application at 04/11/25 0809    nitroglycerin (NITROSTAT) SL tablet 0.4 mg  0.4 mg Sublingual Q5 Min PRN Kerley, Brad Franko, DO        ondansetron (ZOFRAN) injection 4 mg  4 mg Intravenous Q6H PRN Kerley, Brian Joseph,         Pharmacy Consult Cefepime   Not Applicable Continuous PRN Kerley, Brian Joseph,         phenylephrine (JOSEPH-SYNEPHRINE) 50 mg in 250 mL NS infusion  0.5-3 mcg/kg/min Intravenous  Titrated Deanne Bowles MD   Stopped at 04/09/25 2145    [Held by provider] pravastatin (PRAVACHOL) tablet 40 mg  40 mg Oral Daily Kerley, Brian Joseph,    40 mg at 04/08/25 1039    sodium chloride 0.9 % flush 10 mL  10 mL Intravenous Q12H Kerley, Brian Joseph,    10 mL at 04/11/25 0813    sodium chloride 0.9 % flush 10 mL  10 mL Intravenous PRN Kerley, Brian Joseph,         sodium chloride 0.9 % infusion 40 mL  40 mL Intravenous PRN Kerley, Brian Joseph,            amiodarone, 200 mg, Oral, Q24H  apixaban, 2.5 mg, Oral, Q12H  cefepime, 2,000 mg, Intravenous, Q24H  insulin lispro, 2-9 Units, Subcutaneous, 4x Daily AC & at Bedtime  metoprolol succinate XL, 25 mg, Oral, Daily  midodrine, 5 mg, Oral, TID AC  mupirocin, 1 Application, Each Nare, BID  [Held by provider] pravastatin, 40 mg, Oral, Daily  sodium chloride, 10 mL, Intravenous, Q12H        IV Meds:   Pharmacy Consult,   phenylephrine, 0.5-3 mcg/kg/min, Last Rate: Stopped (04/09/25 2145)        Results Reviewed:   I have personally reviewed the results from the time of this admission to 4/11/2025 10:12 EDT     Results from last 7 days   Lab Units 04/11/25  0414 04/10/25  0522 04/09/25  0506   SODIUM mmol/L 135* 135* 136   POTASSIUM mmol/L 4.0 3.8 4.5   CHLORIDE mmol/L 100 99 99   CO2 mmol/L 20.2* 19.2* 20.0*   BUN mg/dL 88* 91* 92*   CREATININE mg/dL 2.61* 2.67* 2.64*   CALCIUM mg/dL 7.2* 7.2* 7.5*   BILIRUBIN mg/dL 0.9 0.7 1.0   ALK PHOS U/L 148* 136* 151*   ALT (SGPT) U/L 47* 56* 72*   AST (SGOT) U/L 36 44* 67*   GLUCOSE mg/dL 109* 150* 120*       Estimated Creatinine Clearance: 21.1 mL/min (A) (by C-G formula based on SCr of 2.61 mg/dL (H)).    Results from last 7 days   Lab Units 04/07/25  1753   MAGNESIUM mg/dL 2.8*   PHOSPHORUS mg/dL 6.4*             Results from last 7 days   Lab Units 04/11/25  0414 04/10/25  0522 04/09/25  0506 04/08/25  0441 04/07/25  1753   WBC 10*3/mm3 5.95 5.30 8.65 8.71 6.87   HEMOGLOBIN g/dL 11.7* 11.1* 12.1* 12.0*  "12.2*   PLATELETS 10*3/mm3 97* 99* 152 180 137*             Brief Urine Lab Results  (Last result in the past 365 days)        Color   Clarity   Blood   Leuk Est   Nitrite   Protein   CREAT   Urine HCG        04/08/25 1823 Yellow   Clear   Negative   Small (1+)   Negative   30 mg/dL (1+)                   No results found for: \"UTPCR\"    Imaging Results (Last 24 Hours)       ** No results found for the last 24 hours. **                Assessment / Plan     ASSESSMENT:    COVID-19    Acute kidney injury: Patient does have significant worsening of renal function from his baseline likely hemodynamically mediated as well as cardiorenal syndrome.  Continue to optimize medications.  He does not have any significant electrolyte abnormalities bicarb is stable as well.  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  Valvular heart disease: Continue to optimize medications.  Coronary artery disease: Not a candidate for any aggressive cardiac workup, cardiac evaluation is in progress.  Congestive heart failure: Will need to optimize his volume status will adjust diuretics as needed.  Atrial fibrillation: Currently on amiodarone with some improvement in atrial fibrillation.  Type 2 diabetes: Continue with the sliding scale  Peripheral vascular disease: Continue to optimize medications no intervention planned.  Obstructive sleep apnea: Stable use of BiPAP.      PLAN:  Renal function no significant change.  Blood pressure is slightly better.  I will go ahead and give 1 dose of Lasix 40 mg IV and see if his blood pressure will tolerate likely will help improve his fluid status as well as renal function.  Details were discussed with the patient no family in the room.    Details were also discussed with the hospitalist service and or other providers as needed.  Palliative care team will have " discussion with the patient and family.  No decisions have been made yet.  Continue with rest of the current treatment plan, and monitor with surveillance labs, adjust medication doses to the eGFR.  Further recommendations will depend on clinical course of the patient during the current hospitalization.   I have reviewed the copied text to this note, it was edited and the changes made as needed.  It is accurate to the point, when the note was signed today.     Thank you for involving us in the care of Blane Dietz.  Please feel free to call with any questions.    Andriy Foote MD, FASN  04/11/25  10:12 EDT    [facility]  [phone]      Part of this note may be an electronic transcription/translation of spoken language to printed text using the Dragon Dictation System.

## 2025-04-11 NOTE — THERAPY EVALUATION
Patient Name: Blane Dietz  : 1935    MRN: [id number]                              Today's Date: 2025       Admit Date: 2025    Visit Dx:     ICD-10-CM ICD-9-CM   1. COVID-19  U07.1 079.89   2. Shock  R57.9 785.50   3. Atrial fibrillation with rapid ventricular response  I48.91 427.31     Patient Active Problem List   Diagnosis    Essential hypertension    Type 2 diabetes mellitus with hyperglycemia, with long-term current use of insulin    Prostate cancer    Aortic stenosis    Sleep apnea    S/P AVR    HLD (hyperlipidemia)    Renal mass    s/p right nepheroureterectomy and adrenalectomy     Diffuse large B-cell lymphoma of solid organ excluding spleen    PICC (peripherally inserted central catheter) flush    History of pulmonary embolism    History of malignant neoplasm of prostate    Lymphoma of kidney    Stage 3b chronic kidney disease    Pneumonia due to COVID-19 virus    Abrasion    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)    Valvular heart disease    Atrial arrhythmia    RBBB    Atrial fibrillation with rapid ventricular response    Acute HFrEF (heart failure with reduced ejection fraction)    COVID-19     Past Medical History:   Diagnosis Date    Aortic stenosis     status post ROSS procedure, remote, with 2015 echocardiography revealing normal aortic and pulmonary valve function, normal ejection fraction and mild to moderate MR    Arthritis     Bilateral impacted cerumen 2016    Bronchitis     CHF (congestive heart failure)     Chronic gout of right foot 2016    Impression: 2016 - stable.;     COVID-19 vaccine series completed 2021    [place] 2nd dose 3/12/21.    Depression     Diabetes mellitus     Diverticulitis     Exogenous obesity     Gout     Heart murmur     History of vitamin D deficiency     Hypercholesteremia 2016    Hyperlipidemia     Hypertension     Impaired mobility     Kidney lesion     Malignant neoplasm of  prostate     Morbid obesity 08/11/2016    Pneumonia 05/12/2021    Primary osteoarthritis involving multiple joints 06/13/2016    Impression: 03/08/2016 - stable;     Pulmonary embolism     Sleep apnea 05/12/2021    C-Pap    Uncontrolled type 2 diabetes mellitus with hyperglycemia 06/13/2016    Impression: 03/08/2016 - seeing Endo .;     Vertigo      Past Surgical History:   Procedure Laterality Date    CARDIAC VALVE REPLACEMENT  05/12/2021    Ross procedure 22 years ago    COLON SURGERY      COLONOSCOPY      COLOSTOMY  1999    COLOSTOMY REVISION      CYSTOSCOPY N/A 12/7/2021    Procedure: CYSTOSCOPY FLEXIBLE;  Surgeon: José Miguel Villafuerte Jr., MD;  Location:  [facility];  Service: Urology;  Laterality: N/A;    EXPLORATORY LAPAROTOMY      With Tissue Removal    LIPOMA EXCISION      MOHS SURGERY      NEPHROURETERECTOMY Right 12/7/2021    Procedure: RIGHT NEPHROURETERECTOMY LAPAROSCOPIC WITH DAVINCI ROBOT;  Surgeon: José Miguel Villafuerte Jr., MD;  Location:  [facility];  Service: Robotics - DaVinci;  Laterality: Right;    OTHER SURGICAL HISTORY      Porcine Valve    PROSTATE SURGERY      PROSTATECTOMY  2000     History of Prostatectomy Perineal Radical    SKIN CANCER EXCISION      from head and lip    TONSILLECTOMY        General Information       Row Name 04/11/25 1239          OT Time and Intention    Subjective Information weakness  -CONI     Document Type evaluation  -CONI     Mode of Treatment occupational therapy  -CONI     Patient Effort good  -CONI     Symptoms Noted During/After Treatment fatigue  -CONI       Row Name 04/11/25 1237          General Information    Patient Profile Reviewed yes  -CONI     Prior Level of Function min assist:;transfer;all household mobility;ADL's  -CONI     Existing Precautions/Restrictions fall  -CONI     Barriers to Rehab medically complex;previous functional deficit  -CONI       Row Name 04/11/25 1236          Occupational Profile    Occupational History/Life Experiences (Occupational Profile)  Living in assisted living. Pt's daughter is reportedly moving patient from second floor to first floor.  -       Row Name 04/11/25 1239          Living Environment    Current Living Arrangements assisted living facility  -     People in Home facility resident  -       Row Name 04/11/25 1239          Home Main Entrance    Number of Stairs, Main Entrance none  Second floor but has elevator access  -       Row Name 04/11/25 1239          Cognition    Orientation Status (Cognition) oriented x 4  -       Row Name 04/11/25 1239          Safety Issues/Impairments Affecting Functional Mobility    Safety Issues Affecting Function (Mobility) ability to follow commands;at risk behavior observed;awareness of need for assistance;insight into deficits/self-awareness;judgment;positioning of assistive device;problem-solving;safety precaution awareness;safety precautions follow-through/compliance;sequencing abilities  -     Impairments Affecting Function (Mobility) endurance/activity tolerance;postural/trunk control;strength  -               User Key  (r) = Recorded By, (t) = Taken By, (c) = Cosigned By      Initials Name Provider Type     Olimpia Phillips OT Occupational Therapist                     Mobility/ADL's       Row Name 04/11/25 1241          Bed Mobility    Bed Mobility supine-sit  -     Supine-Sit [place] (Bed Mobility) minimum assist (75% patient effort)  -       Row Name 04/11/25 1241          Transfers    Transfers bed-chair transfer;sit-stand transfer;stand-sit transfer;toilet transfer  -       Row Name 04/11/25 1241          Bed-Chair Transfer    Bed-Chair [place] (Transfers) minimum assist (75% patient effort)  -     Assistive Device (Bed-Chair Transfers) walker, front-wheeled  -       Row Name 04/11/25 1241          Sit-Stand Transfer    Sit-Stand [place] (Transfers) minimum assist (75% patient effort)  -     Assistive Device (Sit-Stand Transfers) walker, front-wheeled   -CONI       Row Name 04/11/25 1241          Stand-Sit Transfer    Stand-Sit [place] (Transfers) minimum assist (75% patient effort)  -     Assistive Device (Stand-Sit Transfers) walker, front-wheeled  -[facility] 04/11/25 1241          Toilet Transfer    [place] Level (Toilet Transfer) moderate assist (50% patient effort)  -     Assistive Device (Toilet Transfer) commode chair;walker, front-wheeled  -[facility] 04/11/25 1241          Functional Mobility    Patient was able to Ambulate yes  -     Reason Patient was unable to Ambulate --  Covid precautions  -CONI       Row Name 04/11/25 1241          Activities of Daily Living    BADL Assessment/Intervention toileting  -[facility] 04/11/25 1241          Toileting Assessment/Training    [place] Level (Toileting) toileting skills;maximum assist (25% patient effort);perform perineal hygiene  -               User Key  (r) = Recorded By, (t) = Taken By, (c) = Cosigned By      Initials Name Provider Type    Olimpia Hernandez OT Occupational Therapist                   Obj/Interventions       Row Name 04/11/25 1243          Sensory Assessment (Somatosensory)    Sensory Assessment (Somatosensory) sensation intact  -CONI       Row Name 04/11/25 1243          Vision Assessment/Intervention    Visual Impairment/Limitations WFL  -CONI       Row Name 04/11/25 1243          Range of Motion Comprehensive    General Range of Motion bilateral upper extremity ROM WNL  -CONI       Row Name 04/11/25 1243          Strength Comprehensive (MMT)    General Manual Muscle Testing (MMT) Assessment upper extremity strength deficits identified  -     Comment, General Manual Muscle Testing (MMT) Assessment B UE: Grossly 4/5  -CONI       Row Name 04/11/25 1243          Balance    Balance Assessment sitting static balance;sitting dynamic balance;sit to stand dynamic balance;standing static balance;standing dynamic balance  -     Static Sitting Balance standby  assist  -CONI     Dynamic Sitting Balance contact guard  -CONI     Position, Sitting Balance unsupported  -CONI     Sit to Stand Dynamic Balance minimal assist  -CONI     Static Standing Balance contact guard  -CONI     Dynamic Standing Balance minimal assist  -CONI     Position/Device Used, Standing Balance supported;walker, front-wheeled  -CONI               User Key  (r) = Recorded By, (t) = Taken By, (c) = Cosigned By      Initials Name Provider Type    Olimpia Hernandez OT Occupational Therapist                   Goals/Plan       Row Name 04/11/25 1306          Bed Mobility Goal 1 (OT)    Activity/Assistive Device (Bed Mobility Goal 1, OT) bed mobility activities, all  -CONI     [place] Level/Cues Needed (Bed Mobility Goal 1, OT) standby assist  -CONI     Time Frame (Bed Mobility Goal 1, OT) 10 days;long term goal (LTG)  -CONI     Progress/Outcomes (Bed Mobility Goal 1, OT) goal ongoing  -       Row Name 04/11/25 1306          Transfer Goal 1 (OT)    Activity/Assistive Device (Transfer Goal 1, OT) transfers, all  -CONI     [place] Level/Cues Needed (Transfer Goal 1, OT) supervision required  -CONI     Time Frame (Transfer Goal 1, OT) long term goal (LTG);10 days  -CONI     Progress/Outcome (Transfer Goal 1, OT) goal ongoing  -CONI       Row Name 04/11/25 1306          Bathing Goal 1 (OT)    Activity/Device (Bathing Goal 1, OT) bathing skills, all  -CONI     [place] Level/Cues Needed (Bathing Goal 1, OT) contact guard required  -CONI     Time Frame (Bathing Goal 1, OT) 10 days  -CONI     Progress/Outcomes (Bathing Goal 1, OT) goal ongoing  -CONI       Row Name 04/11/25 1306          Dressing Goal 1 (OT)    Activity/Device (Dressing Goal 1, OT) dressing skills, all  -CONI     [place]/Cues Needed (Dressing Goal 1, OT) contact guard required  -CONI     Time Frame (Dressing Goal 1, OT) 10 days;by discharge  -       Row Name 04/11/25 1306          Toileting Goal 1 (OT)    Activity/Device (Toileting Goal 1, OT) adjust/manage  clothing;perform perineal hygiene  -     [place] Level/Cues Needed (Toileting Goal 1, OT) supervision required  -CONI     Time Frame (Toileting Goal 1, OT) long term goal (LTG);10 days  -CONI     Progress/Outcome (Toileting Goal 1, OT) goal ongoing  -       Row Name 04/11/25 1306          Grooming Goal 1 (OT)    Activity/Device (Grooming Goal 1, OT) grooming skills, all  -CONI     [place] (Grooming Goal 1, OT) independent  -CONI     Time Frame (Grooming Goal 1, OT) by discharge  -CONI     Progress/Outcome (Grooming Goal 1, OT) goal ongoing  -       Row Name 04/11/25 1306          Problem Specific Goal 1 (OT)    Problem Specific Goal 1 (OT) Pt will complete sequential standing grooming and functional mobility task with SBA and O2 sats >90%.  -CONI     Time Frame (Problem Specific Goal 1, OT) by discharge  -CONI     Progress/Outcome (Problem Specific Goal 1, OT) goal ongoing  -       Row Name 04/11/25 1306          Therapy Assessment/Plan (OT)    Planned Therapy Interventions (OT) activity tolerance training;patient/caregiver education/training;IADL retraining;adaptive equipment training;BADL retraining;functional balance retraining;occupation/activity based interventions;ROM/therapeutic exercise;strengthening exercise;transfer/mobility retraining  -               User Key  (r) = Recorded By, (t) = Taken By, (c) = Cosigned By      Initials Name Provider Type    Olimpia Hernandez OT Occupational Therapist                   Clinical Impression       Row Name 04/11/25 1249          Pain Assessment    Pretreatment Pain Rating 0/10 - no pain  -     Posttreatment Pain Rating 0/10 - no pain  -       Row Name 04/11/25 1249          Plan of Care Review    Plan of Care Reviewed With patient  -CONI     Progress no change  -CONI     Outcome Evaluation Pt participated in initial OT evaluation this date. Pt required min A and cues to sequence transition to edge of bed to perform standing urination. Pt required min A to  stand from edge of bed with cues for hand placement with RW. Pt required CGA in standing to urinate into urinal, pt able to hold urinal but required min A for balance without UE support on RW. Pt able to maintain static stance with unilateral support on RW. Pt reporting need for BM and required mod A and RW to take side steps to BSC. Pt with posterior loss of balance and decreased safety awareness with RW during transfer. Pt reporting a fear of falling due to weakness. Pt required supervision and significantly increased time on BSC to have a BM. Pt on RA for portion of toileting and sats >95% throughout. Pt required min A to stand from BSC and max A to complete posterior hygiene due to impaired activity tolerance and dynamic balance. Pt challenged to walk several steps to bedside recliner with min A and RW required for safety. Pt unable to walk in hallway due to isolation precautions.  Pt noted to have impaired ability to sequence self-care tasks and attention to task. Pt tolerated session with fair (-) energy for task becoming fatigued with basic ADL routine. Pt educated on discharge recommendations, mobility expectations, and role of OT while inpatient. Continue OT POC.  -       Row Name 04/11/25 1249          Therapy Assessment/Plan (OT)    Patient/Family Therapy Goal Statement (OT) To regain strength and return home.  -     Rehab Potential (OT) good  -     Criteria for Skilled Therapeutic Interventions Met (OT) yes;skilled treatment is necessary  -     Therapy Frequency (OT) 3 times/wk  -     Predicted Duration of Therapy Intervention (OT) 10 days  -       Row Name 04/11/25 1249          Therapy Plan Review/Discharge Plan (OT)    Anticipated Discharge Disposition (OT) sub acute care setting  -       Row Name 04/11/25 1249          Vital Signs    Pre SpO2 (%) 95  -CONI     O2 Delivery Pre Treatment --  C-pap  -     Intra SpO2 (%) 95  -CONI     O2 Delivery Intra Treatment nasal cannula  3L via NC  -CONI      Post SpO2 (%) 96  -CONI     O2 Delivery Post Treatment nasal cannula  3L  -CONI     Pre Patient Position Supine  -CONI     Intra Patient Position Standing  -CONI     Post Patient Position Sitting  -CONI       Row Name 04/11/25 1249          Positioning and Restraints    Pre-Treatment Position in bed  -CONI     Post Treatment Position chair  -CONI     In Chair notified nsg;sitting;call light within reach;encouraged to call for assist;exit alarm on  -CONI               User Key  (r) = Recorded By, (t) = Taken By, (c) = Cosigned By      Initials Name Provider Type    Olimpia Hernandez, OT Occupational Therapist                   Outcome Measures       Row Name 04/11/25 1308          How much help from another is currently needed...    Putting on and taking off regular lower body clothing? 2  -CONI     Bathing (including washing, rinsing, and drying) 2  -CONI     Toileting (which includes using toilet bed pan or urinal) 2  -CONI     Putting on and taking off regular upper body clothing 3  -CONI     Taking care of personal grooming (such as brushing teeth) 3  -CONI     Eating meals 4  -CONI     AM-PAC 6 Clicks Score (OT) 16  -CONI       Row Name 04/11/25 0800          How much help from another person do you currently need...    Turning from your back to your side while in flat bed without using bedrails? 3  -HS     Moving from lying on back to sitting on the side of a flat bed without bedrails? 3  -HS     Moving to and from a bed to a chair (including a wheelchair)? 2  -HS     Standing up from a chair using your arms (e.g., wheelchair, bedside chair)? 2  -HS     Climbing 3-5 steps with a railing? 2  -HS     To walk in hospital room? 2  -HS     AM-PAC 6 Clicks Score (PT) 14  -HS     Highest Level of Mobility Goal 4 --> Transfer to chair/commode  -       Row Name 04/11/25 1308          Functional Assessment    Outcome Measure Options AM-PAC 6 Clicks Daily Activity (OT)  -CONI               User Key  (r) = Recorded By, (t) = Taken By, (c) =  Cosigned By      Initials Name Provider Type    HS Lizzie Leal, RN Registered Nurse    Olimpia Hernandez OT Occupational Therapist                    Occupational Therapy Education       Title: PT OT SLP Therapies (In Progress)       Topic: Occupational Therapy (In Progress)       Point: ADL training (In Progress)       Learning Progress Summary            Patient Acceptance, E, NR by CONI at 4/11/2025 1308    Comment: Fall prevention, OT POC                      Point: Precautions (In Progress)       Learning Progress Summary            Patient Acceptance, E, NR by CONI at 4/11/2025 1308    Comment: Fall prevention, OT POC                                      User Key       Initials Effective Dates Name Provider Type Discipline    CONI 05/29/24 -  Olimpia Phillips OT Occupational Therapist OT                  OT Recommendation and Plan  Planned Therapy Interventions (OT): activity tolerance training, patient/caregiver education/training, IADL retraining, adaptive equipment training, BADL retraining, functional balance retraining, occupation/activity based interventions, ROM/therapeutic exercise, strengthening exercise, transfer/mobility retraining  Therapy Frequency (OT): 3 times/wk  Plan of Care Review  Plan of Care Reviewed With: patient  Progress: no change  Outcome Evaluation: Pt participated in initial OT evaluation this date. Pt required min A and cues to sequence transition to edge of bed to perform standing urination. Pt required min A to stand from edge of bed with cues for hand placement with RW. Pt required CGA in standing to urinate into urinal, pt able to hold urinal but required min A for balance without UE support on RW. Pt able to maintain static stance with unilateral support on RW. Pt reporting need for BM and required mod A and RW to take side steps to BSC. Pt with posterior loss of balance and decreased safety awareness with RW during transfer. Pt reporting a fear of falling due to weakness.  Pt required supervision and significantly increased time on BSC to have a BM. Pt on RA for portion of toileting and sats >95% throughout. Pt required min A to stand from BSC and max A to complete posterior hygiene due to impaired activity tolerance and dynamic balance. Pt challenged to walk several steps to bedside recliner with min A and RW required for safety. Pt unable to walk in hallway due to isolation precautions.  Pt noted to have impaired ability to sequence self-care tasks and attention to task. Pt tolerated session with fair (-) energy for task becoming fatigued with basic ADL routine. Pt educated on discharge recommendations, mobility expectations, and role of OT while inpatient. Continue OT POC.     Time Calculation:   Evaluation Complexity (OT)  Review Occupational Profile/Medical/Therapy History Complexity: expanded/moderate complexity  Assessment, Occupational Performance/Identification of Deficit Complexity: 3-5 performance deficits  Clinical Decision Making Complexity (OT): detailed assessment/moderate complexity  Overall Complexity of Evaluation (OT): moderate complexity     Time Calculation- OT       Row Name 04/11/25 1309             Time Calculation- OT    OT Stop Time 1114  -      OT Received On 04/11/25  -CONI      OT Goal Re-Cert Due Date 04/21/25  -CONI         SNF Occupational Therapy Minutes    Skilled Minutes- OT 55 min  -                User Key  (r) = Recorded By, (t) = Taken By, (c) = Cosigned By      Initials Name Provider Type    Olimpia Hernandez OT Occupational Therapist                  Therapy Charges for Today       Code Description Service Date Service Provider Modifiers Qty    [id number]  OT EVAL MOD COMPLEXITY 4 4/11/2025 Olimpia Phillips OT GO 1                 Olimpia Phillips OT  4/11/2025

## 2025-04-11 NOTE — CASE MANAGEMENT/SOCIAL WORK
Case Management/Social Work    Patient Name:  Blane Dietz  YOB: 1935  MRN: [id number]  Admit Date:  4/7/2025        09:36 EDT   Discharge Plan       Row Name 04/11/25 0935       Plan    Plan possible home or ltc /hospice,ccc;   tbd                        Electronically signed by:  Gina Molina RN  04/11/25 09:36 EDT

## 2025-04-11 NOTE — PLAN OF CARE
Goal Outcome Evaluation:  Plan of Care Reviewed With: patient        Progress: improving  Outcome Evaluation: pt participated in PT tx this date. Pt came from supine to sitting min A in order to perform standing urination. Pt required min A to continue standing. Pt transfered to bedside commode min-mod A using RW with max verbal cues. pt is very fearful of falling and often asked about his vitals. Pt had a BM, requiring SPV and max A for hygiene. Pt ambulated to bedside pato and was left seated with all needs met. Pt tolerated session with supplemental O2 doffed for majority of session with O2 sats remaining above 94%. pt progressing well, cont per POC

## 2025-04-12 LAB
ANION GAP SERPL CALCULATED.3IONS-SCNC: 14.8 MMOL/L (ref 5–15)
BACTERIA SPEC AEROBE CULT: NORMAL
BACTERIA SPEC AEROBE CULT: NORMAL
BUN SERPL-MCNC: 81 MG/DL (ref 8–23)
BUN/CREAT SERPL: 33.8 (ref 7–25)
CALCIUM SPEC-SCNC: 7.6 MG/DL (ref 8.6–10.5)
CHLORIDE SERPL-SCNC: 104 MMOL/L (ref 98–107)
CO2 SERPL-SCNC: 19.2 MMOL/L (ref 22–29)
CREAT SERPL-MCNC: 2.4 MG/DL (ref 0.76–1.27)
DEPRECATED RDW RBC AUTO: 52.7 FL (ref 37–54)
EGFRCR SERPLBLD CKD-EPI 2021: 25.5 ML/MIN/1.73
ERYTHROCYTE [DISTWIDTH] IN BLOOD BY AUTOMATED COUNT: 16.6 % (ref 12.3–15.4)
GLUCOSE BLDC GLUCOMTR-MCNC: 108 MG/DL (ref 70–130)
GLUCOSE BLDC GLUCOMTR-MCNC: 131 MG/DL (ref 70–130)
GLUCOSE BLDC GLUCOMTR-MCNC: 199 MG/DL (ref 70–130)
GLUCOSE BLDC GLUCOMTR-MCNC: 238 MG/DL (ref 70–130)
GLUCOSE SERPL-MCNC: 83 MG/DL (ref 65–99)
HCT VFR BLD AUTO: 35.5 % (ref 37.5–51)
HGB BLD-MCNC: 11.5 G/DL (ref 13–17.7)
MCH RBC QN AUTO: 29.3 PG (ref 26.6–33)
MCHC RBC AUTO-ENTMCNC: 32.4 G/DL (ref 31.5–35.7)
MCV RBC AUTO: 90.3 FL (ref 79–97)
PLATELET # BLD AUTO: 87 10*3/MM3 (ref 140–450)
PMV BLD AUTO: 11.4 FL (ref 6–12)
POTASSIUM SERPL-SCNC: 4.2 MMOL/L (ref 3.5–5.2)
RBC # BLD AUTO: 3.93 10*6/MM3 (ref 4.14–5.8)
SODIUM SERPL-SCNC: 138 MMOL/L (ref 136–145)
WBC NRBC COR # BLD AUTO: 4.85 10*3/MM3 (ref 3.4–10.8)

## 2025-04-12 PROCEDURE — 25010000002 FUROSEMIDE PER 20 MG: Performed by: INTERNAL MEDICINE

## 2025-04-12 PROCEDURE — 82948 REAGENT STRIP/BLOOD GLUCOSE: CPT

## 2025-04-12 PROCEDURE — 63710000001 INSULIN LISPRO (HUMAN) PER 5 UNITS: Performed by: FAMILY MEDICINE

## 2025-04-12 PROCEDURE — 25010000002 CEFEPIME PER 500 MG: Performed by: FAMILY MEDICINE

## 2025-04-12 PROCEDURE — 85027 COMPLETE CBC AUTOMATED: CPT | Performed by: FAMILY MEDICINE

## 2025-04-12 PROCEDURE — 82948 REAGENT STRIP/BLOOD GLUCOSE: CPT | Performed by: FAMILY MEDICINE

## 2025-04-12 PROCEDURE — 99232 SBSQ HOSP IP/OBS MODERATE 35: CPT | Performed by: FAMILY MEDICINE

## 2025-04-12 PROCEDURE — 80048 BASIC METABOLIC PNL TOTAL CA: CPT | Performed by: FAMILY MEDICINE

## 2025-04-12 RX ORDER — FUROSEMIDE 10 MG/ML
40 INJECTION INTRAMUSCULAR; INTRAVENOUS ONCE
Status: COMPLETED | OUTPATIENT
Start: 2025-04-12 | End: 2025-04-12

## 2025-04-12 RX ADMIN — APIXABAN 2.5 MG: 2.5 TABLET, FILM COATED ORAL at 20:54

## 2025-04-12 RX ADMIN — MIDODRINE HYDROCHLORIDE 5 MG: 5 TABLET ORAL at 08:50

## 2025-04-12 RX ADMIN — METOPROLOL SUCCINATE 25 MG: 25 TABLET, EXTENDED RELEASE ORAL at 08:51

## 2025-04-12 RX ADMIN — AMIODARONE HYDROCHLORIDE 200 MG: 200 TABLET ORAL at 08:51

## 2025-04-12 RX ADMIN — Medication 10 ML: at 20:54

## 2025-04-12 RX ADMIN — CEFEPIME 2000 MG: 2 INJECTION, POWDER, FOR SOLUTION INTRAVENOUS at 19:25

## 2025-04-12 RX ADMIN — INSULIN LISPRO 4 UNITS: 100 INJECTION, SOLUTION INTRAVENOUS; SUBCUTANEOUS at 20:54

## 2025-04-12 RX ADMIN — MUPIROCIN 1 APPLICATION: 20 OINTMENT TOPICAL at 20:54

## 2025-04-12 RX ADMIN — INSULIN LISPRO 2 UNITS: 100 INJECTION, SOLUTION INTRAVENOUS; SUBCUTANEOUS at 17:28

## 2025-04-12 RX ADMIN — MUPIROCIN 1 APPLICATION: 20 OINTMENT TOPICAL at 08:50

## 2025-04-12 RX ADMIN — Medication 10 ML: at 08:52

## 2025-04-12 RX ADMIN — MIDODRINE HYDROCHLORIDE 5 MG: 5 TABLET ORAL at 17:25

## 2025-04-12 RX ADMIN — APIXABAN 2.5 MG: 2.5 TABLET, FILM COATED ORAL at 08:50

## 2025-04-12 RX ADMIN — FUROSEMIDE 40 MG: 10 INJECTION, SOLUTION INTRAMUSCULAR; INTRAVENOUS at 14:45

## 2025-04-12 NOTE — PROGRESS NOTES
[facility]    Name:  Blane Dietz   Age:  89 y.o.  Sex:  male  :  1935  MRN:  [id number]   Visit Number:  [id number]  Admission Date:  2025  Date Of Service:  25  Primary Care Physician:  David Em MD     LOS: 5 days :    Chief Complaint:      Shortness of air, N/V/D     Subjective:    Patient was seen examined bedside today.  Patient sitting up comfortably in the bed.  Patient reports overall improvement.  Denies any shortness of breath today.  Denies any pain or acute complaints otherwise. Blood pressure stable on midodrine.  He is on 2 L nasal cannula, afebrile.  Continues to appear generally weak but improving clinically.    Hospital Course:    Blane Dietz is an 89-year-old man with past medical history of heart failure reduced ejection fraction, moderate AR, moderate MR, type 2 diabetes, cirrhosis, hypertension, prostate cancer, aortic stenosis status post AVR, HUSSAIN on CPAP, hyperlipidemia, history of diffuse large B-cell lymphoma of kidney, CKD, atrial fibrillation.  Presented to [facility] ED on 2025 with concern for several weeks of various degrees of diarrhea, shortness of air with cough.  Family reports the patient actually has been off of this medication for several weeks.  Denied any chest pain, abdominal pain.  Says that he does feel somewhat short of air.  Denied any fevers or chills.     ED summary: Afebrile, atrial fibrillation with RVR rate as high as 160 resolved with cardioversion, hypotension resolved with fluids and Levophed, vital signs stable on 2 L baseline.  EKG initially atrial fibrillation with RVR rate 161.  After resolution sinus rhythm with first-degree AV block, right bundle branch block.  Troponin monitoring 1, 41, ACS not suspected.  proBNP over 18,000.  Sodium 131, anion gap 13.5, BUN 95, creatinine 2.83, EGFR 20, blood glucose 246, calcium 8.0, magnesium 2.8, Phos 6.4, , ALT 77, bilirubin 1.3.  Lactate  2.7, procalcitonin 0.25, no leukocytosis, hemoglobin 12.2.  Blood cultures.  COVID-19 positive.  CT angio chest bilateral pleural effusions, no acute PE, 4.6 cm diameter dilated ascending aorta.  CT ab/pelvis findings suggesting decompensated cirrhotic portal hypertension, edematous gallbladder likely secondary to third spacing, thick-walled descending colon and distal rectum indeterminant.  He was provided IV amiodarone, vancomycin and cefepime, etomidate, Xylocaine, 2 L bolus.  Started on Levophed.  ED physician plans on placing central line.    Review of Systems:     All systems were reviewed and negative except as mentioned in subjective, assessment and plan.    Vital Signs:    Temp:  [96.1 °F (35.6 °C)-98.4 °F (36.9 °C)] 98.2 °F (36.8 °C)  Heart Rate:  [64-89] 75  Resp:  [18-20] 18  BP: (101-131)/(57-72) 123/71    Intake and output:    I/O last 3 completed shifts:  In: 580 [P.O.:480; IV Piggyback:100]  Out: 800 [Urine:800]  No intake/output data recorded.    Physical Examination:    General Appearance:  Alert and cooperative.  Chronically ill-appearing.  No acute distress.  Generalized weakness noted.   Head:  Atraumatic and normocephalic.   Eyes: Conjunctivae and sclerae normal, no icterus. No pallor.   Throat: No oral lesions, no thrush, oral mucosa moist.   Neck: Supple, trachea midline, no thyromegaly.   Lungs:   Breath sounds heard bilaterally equally.  No wheezing.  Bibasilar rhonchi heard.  Decreased air movement bilaterally.  No Pleural rub or bronchial breathing.  Unlabored.  On supplemental oxygen.   Heart:  Normal S1 and S2, no murmur, no gallop, no rub. No JVD.   Abdomen:   Normal bowel sounds, no masses, no organomegaly. Soft, nontender, nondistended, no rebound tenderness.   Extremities: Supple, no edema, no cyanosis, no clubbing.    Skin: No bleeding.  Superficial chronic leg wound, see photo    Neurologic: Alert and oriented x 3. No facial asymmetry. Moves all four limbs. No tremors.   "    Laboratory results:    Results from last 7 days   Lab Units 04/12/25  0620 04/11/25 0414 04/10/25  0522 04/09/25  0506   SODIUM mmol/L 138 135* 135* 136   POTASSIUM mmol/L 4.2 4.0 3.8 4.5   CHLORIDE mmol/L 104 100 99 99   CO2 mmol/L 19.2* 20.2* 19.2* 20.0*   BUN mg/dL 81* 88* 91* 92*   CREATININE mg/dL 2.40* 2.61* 2.67* 2.64*   CALCIUM mg/dL 7.6* 7.2* 7.2* 7.5*   BILIRUBIN mg/dL  --  0.9 0.7 1.0   ALK PHOS U/L  --  148* 136* 151*   ALT (SGPT) U/L  --  47* 56* 72*   AST (SGOT) U/L  --  36 44* 67*   GLUCOSE mg/dL 83 109* 150* 120*     Results from last 7 days   Lab Units 04/12/25  0620 04/11/25  0414 04/10/25  0522   WBC 10*3/mm3 4.85 5.95 5.30   HEMOGLOBIN g/dL 11.5* 11.7* 11.1*   HEMATOCRIT % 35.5* 35.5* 33.6*   PLATELETS 10*3/mm3 87* 97* 99*         Results from last 7 days   Lab Units 04/07/25 1910 04/07/25  1753   HSTROP T ng/L 41* 101*     Results from last 7 days   Lab Units 04/07/25 1916 04/07/25 1910 04/06/25  1330   BLOODCX  No growth at 4 days No growth at 4 days  --    URINECX   --   --  50,000 CFU/mL Mixed Sissy Isolated     No results for input(s): \"PHART\", \"XDS7WEG\", \"PO2ART\", \"ROC0GCW\", \"BASEEXCESS\" in the last 8760 hours.   I have reviewed the patient's laboratory results.    Radiology results:    No radiology results from the last 24 hrs    I have reviewed the patient's radiology reports.    Medication Review:     I have reviewed the patient's active and prn medications.     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  Transaminitis  Hyperbilirubinemia  Chronic leg wound  AAA     Chronic: Heart failure reduced ejection fraction, moderate AR, moderate MR, type 2 diabetes, cirrhosis, hypertension, prostate cancer, aortic stenosis status post AVR, HUSSAIN on CPAP, hyperlipidemia, history of " diffuse large B-cell lymphoma of kidney, CKD, atrial fibrillation    Plan:    Inpatient ICU admission 4/7/2025 with atrial fibrillation with RVR status post cardioversion in ED, hypotension likely multifactorial requiring Levophed, elevated troponins ACS not suspected likely demand ischemia, COVID-19 infection on his baseline 2 L, acute exacerbation of heart failure reduced ejection fraction, hyponatremia, 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     Atrial fibrillation with RVR, resolved  -Cardiology consultation, recommendations appreciated.  -Status post cardioversion in the ED.  - Per cardiology commendations, continuing IV amiodarone for a total of 48-72 hours. Then can be changed to oral amiodarone therapy at 200 mg once per day.   - switched to p.o. amiodarone.    Elevated troponins  -ACS not suspected, likely demand ischemia.    Acute exacerbation of heart failure reduced ejection fraction  Bilateral pleural effusions.   -Previous 2D echo on file from December 2024 with EF of 38%  -Daily weight, strict ins and outs  -Diuretics per nephrology  -Cardiology consulted, appreciate recommendations    Hypotension/shock, improved  -Shock likely multifactorial given multiple chronic severe conditions, but could be related to sepsis  -Central line placed in ED  -added midodrine   -IV pressors titrated off.  - Transferred out of ICU and central line DC'd on 4/11    Colitis, suspected  -Ct showed: Thick-walled ascending colon and distal rectum are indeterminate. Correlate with signs or symptoms of colitis.  - Initially started vancomycin and cefepime on admission out of an abundance of caution.  -Blood cultures remains negative x 24 hours  -Unsure if hypotension has an element of septic shock, likely multifactorial given multiple chronic severe conditions.  -GI panel and C. Difficile both negative  -MRSA screen negative, discontinue vancomycin, continue Zosyn  -WBC WNL,  afebrile    COVID-19 infection  Chronic respiratory failure  -On his 2 L baseline.  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  -Avoid nephrotoxic drugs  -Diuretics per nephrology  - I personally discussed with Dr. Foote today.    Transaminitis, improved  Hyperbilirubinemia, improved  Cirrhosis  -Findings on imaging suggesting decompensated cirrhotic portal hypertension. Edematous gallbladder is likely secondary to 3rd spacing of fluid rather than cholecystitis.  -Right upper quadrant ultrasound ordered and pending  - Abnormal LFTs likely secondary to chronic cirrhosis.  - Monitor liver function  - Held statin    Chronic leg wound  -Wound care.    AAA  -Ascending aorta 4.6 cm diameter. Similar to prior.   -Recommend follow-up.     Chronic: Heart failure reduced ejection fraction, moderate AR, moderate MR, type 2 diabetes, cirrhosis, hypertension, prostate cancer, aortic stenosis status post AVR, HUSSAIN on CPAP, hyperlipidemia, history of diffuse large B-cell lymphoma of kidney, CKD, atrial fibrillation  -Subcutaneous insulin protocol.  -Continue other home medications.     -I have reviewed the copied text and it is accurate as of 4/12/2025     -Palliative care consulted for further discussions on goals of care, patient does not want any escalation of care, he is agreeable for hospice at this point but still undecided if his going to do home with hospice versus hospice care center.  Will see how patient will progress over the next day or 2 and decide based on that.    DVT Prophylaxis: Eliquis  Code Status: DNR/DNI  Diet: Cardiac/renal  Discharge Plan: To be determined, likely needs 1 to 2 days in the hospital.  Palliative care to discuss on Monday.    Deanne Bowles MD  04/12/25  07:42 EDT    Dictated utilizing Dragon dictation.

## 2025-04-12 NOTE — PLAN OF CARE
Goal Outcome Evaluation:   Pvc and bbb on tele pt on cpap sating >90% oriented, afebrile will pass to day shift    Problem: Adult Inpatient Plan of Care  Goal: Plan of Care Review  Outcome: Progressing  Flowsheets (Taken 4/11/2025 1713 by Ghazala Pablo, Nursing Student)  Plan of Care Reviewed With: patient  Goal: Patient-Specific Goal (Individualized)  Outcome: Progressing  Goal: Absence of Hospital-Acquired Illness or Injury  Outcome: Progressing  Intervention: Identify and Manage Fall Risk  Recent Flowsheet Documentation  Taken 4/12/2025 0600 by Jaylon Sanchez RN  Safety Promotion/Fall Prevention: safety round/check completed  Taken 4/12/2025 0400 by Jaylon Sanchez RN  Safety Promotion/Fall Prevention: safety round/check completed  Taken 4/12/2025 0200 by Jaylon Sanchez RN  Safety Promotion/Fall Prevention: safety round/check completed  Taken 4/12/2025 0000 by Jaylon Sanchez RN  Safety Promotion/Fall Prevention: safety round/check completed  Taken 4/11/2025 2200 by Jaylon Sanchez RN  Safety Promotion/Fall Prevention: safety round/check completed  Taken 4/11/2025 2000 by Jaylon Sanchez RN  Safety Promotion/Fall Prevention: safety round/check completed  Intervention: Prevent Skin Injury  Recent Flowsheet Documentation  Taken 4/12/2025 0600 by Jaylon Sanchez RN  Body Position: position changed independently  Taken 4/12/2025 0400 by Jaylon Sanchez RN  Body Position: position changed independently  Taken 4/12/2025 0200 by Jaylon Sanchez RN  Body Position: position changed independently  Taken 4/12/2025 0000 by Jaylon Sanchez RN  Body Position: position changed independently  Taken 4/11/2025 2200 by Jaylon Sanchez RN  Body Position: position changed independently  Taken 4/11/2025 2000 by Jaylon Sanchez RN  Body Position: position changed independently  Intervention: Prevent and Manage VTE (Venous Thromboembolism) Risk  Recent Flowsheet Documentation  Taken 4/11/2025 2000 by Jaylon Sanchez RN  VTE  Prevention/Management: (eliquis) --  Intervention: Prevent Infection  Recent Flowsheet Documentation  Taken 4/12/2025 0400 by Jaylon Sanchez RN  Infection Prevention: single patient room provided  Taken 4/12/2025 0200 by Jaylon Sanchez RN  Infection Prevention: hand hygiene promoted  Taken 4/12/2025 0000 by Jaylon Sanchez RN  Infection Prevention: environmental surveillance performed  Taken 4/11/2025 2200 by Jaylon Sanchez RN  Infection Prevention: environmental surveillance performed  Taken 4/11/2025 2000 by Jaylon Sanchez RN  Infection Prevention: rest/sleep promoted  Goal: Optimal Comfort and Wellbeing  Outcome: Progressing  Intervention: Provide Person-Centered Care  Recent Flowsheet Documentation  Taken 4/11/2025 2000 by Jaylon Sanchez RN  Trust Relationship/Rapport:   care explained   choices provided  Goal: Readiness for Transition of Care  Outcome: Progressing  Goal: Plan of Care Review  Outcome: Progressing  Goal: Patient-Specific Goal (Individualized)  Outcome: Progressing  Goal: Absence of Hospital-Acquired Illness or Injury  Outcome: Progressing  Intervention: Identify and Manage Fall Risk  Recent Flowsheet Documentation  Taken 4/12/2025 0600 by Jaylon Sanchez RN  Safety Promotion/Fall Prevention: safety round/check completed  Taken 4/12/2025 0400 by Jaylon Sanchez RN  Safety Promotion/Fall Prevention: safety round/check completed  Taken 4/12/2025 0200 by Jaylon Sanchez RN  Safety Promotion/Fall Prevention: safety round/check completed  Taken 4/12/2025 0000 by Jaylon Sanchez RN  Safety Promotion/Fall Prevention: safety round/check completed  Taken 4/11/2025 2200 by Jaylon Sanchez RN  Safety Promotion/Fall Prevention: safety round/check completed  Taken 4/11/2025 2000 by Jaylon Sanchez RN  Safety Promotion/Fall Prevention: safety round/check completed  Intervention: Prevent Skin Injury  Recent Flowsheet Documentation  Taken 4/12/2025 0600 by Jaylon Sanchez RN  Body Position: position changed  independently  Taken 4/12/2025 0400 by Jaylon Sanchez RN  Body Position: position changed independently  Taken 4/12/2025 0200 by Jaylon Sanchez RN  Body Position: position changed independently  Taken 4/12/2025 0000 by Jaylon Sanchez RN  Body Position: position changed independently  Taken 4/11/2025 2200 by Jaylon Sanchez RN  Body Position: position changed independently  Taken 4/11/2025 2000 by Jaylon Sanchez RN  Body Position: position changed independently  Intervention: Prevent and Manage VTE (Venous Thromboembolism) Risk  Recent Flowsheet Documentation  Taken 4/11/2025 2000 by Jaylon Sanchez RN  VTE Prevention/Management: (eliquis) --  Intervention: Prevent Infection  Recent Flowsheet Documentation  Taken 4/12/2025 0400 by Jaylon Sanchez RN  Infection Prevention: single patient room provided  Taken 4/12/2025 0200 by Jaylon Sanchez RN  Infection Prevention: hand hygiene promoted  Taken 4/12/2025 0000 by Jaylon Sanchez RN  Infection Prevention: environmental surveillance performed  Taken 4/11/2025 2200 by Jaylon Sanchez RN  Infection Prevention: environmental surveillance performed  Taken 4/11/2025 2000 by Jaylon Sanchez RN  Infection Prevention: rest/sleep promoted  Goal: Optimal Comfort and Wellbeing  Outcome: Progressing  Intervention: Provide Person-Centered Care  Recent Flowsheet Documentation  Taken 4/11/2025 2000 by Jaylon Sanchez RN  Trust Relationship/Rapport:   care explained   choices provided  Goal: Readiness for Transition of Care  Outcome: Progressing     Problem: Comorbidity Management  Goal: Maintenance of Heart Failure Symptom Control  Outcome: Progressing     Problem: Skin Injury Risk Increased  Goal: Skin Health and Integrity  Outcome: Progressing  Intervention: Optimize Skin Protection  Recent Flowsheet Documentation  Taken 4/12/2025 0600 by Jaylon Sanchez RN  Head of Bed (HOB) Positioning: HOB elevated  Taken 4/12/2025 0400 by Jaylon Sanchez RN  Head of Bed (HOB) Positioning: HOB  elevated  Taken 4/12/2025 0200 by Jaylon Sanchez RN  Head of Bed (HOB) Positioning: HOB elevated  Taken 4/12/2025 0000 by Jaylon Sanchez RN  Head of Bed (HOB) Positioning: HOB elevated  Taken 4/11/2025 2200 by Jaylon Sanchez RN  Head of Bed (HOB) Positioning: HOB elevated  Taken 4/11/2025 2000 by Jaylon Sacnhez RN  Activity Management: activity encouraged  Pressure Reduction Techniques:   frequent weight shift encouraged   weight shift assistance provided  Head of Bed (HOB) Positioning: HOB elevated  Intervention: Promote and Optimize Oral Intake  Recent Flowsheet Documentation  Taken 4/11/2025 2000 by Jaylon Sanchez RN  Oral Nutrition Promotion:   calorie-dense liquids provided   calorie-dense foods provided     Problem: Heart Failure  Goal: Stable Heart Rate and Rhythm  Outcome: Progressing  Goal: Fluid and Electrolyte Balance  Outcome: Progressing  Goal: Effective Oxygenation and Ventilation  Outcome: Progressing  Intervention: Promote Airway Secretion Clearance  Recent Flowsheet Documentation  Taken 4/11/2025 2000 by Jaylon Sanchez RN  Activity Management: activity encouraged  Cough And Deep Breathing: done independently per patient  Intervention: Optimize Oxygenation and Ventilation  Recent Flowsheet Documentation  Taken 4/12/2025 0600 by Jaylon Sanchez RN  Head of Bed (hospitals) Positioning: HOB elevated  Taken 4/12/2025 0400 by Jaylon Sanchez RN  Head of Bed (hospitals) Positioning: HOB elevated  Taken 4/12/2025 0200 by Jaylon Sanchez RN  Head of Bed (hospitals) Positioning: HOB elevated  Taken 4/12/2025 0000 by Jaylon Sanchez RN  Head of Bed (HOB) Positioning: HOB elevated  Taken 4/11/2025 2200 by Jaylon Sanchez RN  Head of Bed (hospitals) Positioning: HOB elevated  Taken 4/11/2025 2000 by Jaylon Sanchez RN  Head of Bed (hospitals) Positioning: HOB elevated  Goal: Effective Breathing Pattern During Sleep  Outcome: Progressing     Problem: Fall Injury Risk  Goal: Absence of Fall and Fall-Related Injury  Outcome:  Progressing  Intervention: Promote Injury-Free Environment  Recent Flowsheet Documentation  Taken 4/12/2025 0600 by Jaylon Sanchez RN  Safety Promotion/Fall Prevention: safety round/check completed  Taken 4/12/2025 0400 by Jaylon Sanchez RN  Safety Promotion/Fall Prevention: safety round/check completed  Taken 4/12/2025 0200 by Jaylon Sanchez RN  Safety Promotion/Fall Prevention: safety round/check completed  Taken 4/12/2025 0000 by Jaylon Sanchez RN  Safety Promotion/Fall Prevention: safety round/check completed  Taken 4/11/2025 2200 by Jaylon Sanchez RN  Safety Promotion/Fall Prevention: safety round/check completed  Taken 4/11/2025 2000 by Jaylon Sanchez RN  Safety Promotion/Fall Prevention: safety round/check completed  Goal: Absence of Fall and Fall-Related Injury  Outcome: Progressing  Intervention: Promote Injury-Free Environment  Recent Flowsheet Documentation  Taken 4/12/2025 0600 by Jaylon Sanchez RN  Safety Promotion/Fall Prevention: safety round/check completed  Taken 4/12/2025 0400 by Jaylon Sanchez RN  Safety Promotion/Fall Prevention: safety round/check completed  Taken 4/12/2025 0200 by Jaylon Sanchez RN  Safety Promotion/Fall Prevention: safety round/check completed  Taken 4/12/2025 0000 by Jaylon Sanchez RN  Safety Promotion/Fall Prevention: safety round/check completed  Taken 4/11/2025 2200 by Jaylon Sanchez RN  Safety Promotion/Fall Prevention: safety round/check completed  Taken 4/11/2025 2000 by Jaylon Sanchez RN  Safety Promotion/Fall Prevention: safety round/check completed     Problem: Gas Exchange Impaired  Goal: Optimal Gas Exchange  Outcome: Progressing  Intervention: Optimize Oxygenation and Ventilation  Recent Flowsheet Documentation  Taken 4/12/2025 0600 by Jaylon Sanchez RN  Head of Bed (HOB) Positioning: HOB elevated  Taken 4/12/2025 0400 by Jaylon Sanchez RN  Head of Bed (HOB) Positioning: HOB elevated  Taken 4/12/2025 0200 by Jaylon Sanchez RN  Head of Bed (HOB) Positioning: HOB  elevated  Taken 4/12/2025 0000 by Jaylon Sanchez RN  Head of Bed (HOB) Positioning: HOB elevated  Taken 4/11/2025 2200 by Jaylon Sanchez RN  Head of Bed ([facility]) Positioning: HOB elevated  Taken 4/11/2025 2000 by Jaylon Sanchez RN  Head of Bed ([facility]) Positioning: [facility] elevated

## 2025-04-12 NOTE — PROGRESS NOTES
[facility] Progress Note  [facility]. [place]        Patient Name: Blane Dietz  : 1935  MRN: [id number]   LOS: 5 days    Patient Care Team:  David Em MD as PCP - General (Internal Medicine)  Ha Toussaint MD as Consulting Physician (Cardiology)  Shannan Ramon MD as Consulting Physician (Hematology and Oncology)  Xavier Boston MD as Consulting Physician (Endocrinology)  Brionna Weinstein DO as Surgeon (General Surgery)  Sophia Ferreira PA as Physician Assistant (Endocrinology)  Abbey Reyez APRN as Nurse Practitioner (Nurse Practitioner)    Chief Complaint:    Chief Complaint   Patient presents with    Shortness of Breath     Primary Care Physician:  David Em MD  Date of admission: 2025    Subjective     Interval History:   Follow-up acute on 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   Patient is awake alert and interactive denies having any chest pain shortness of breath, he has been off pressors since day before yesterday evening.  He has been transferred out of the ICU.  He said he feels better.  Ongoing discussion with palliative care.  Events noted from last 24 hours.  I reviewed the chart and other providers notes, labs and procedures done since my last note.    Review of Systems:   As noted above.    Objective     Vitals:   Temp:  [96.1 °F (35.6 °C)-98.4 °F (36.9 °C)] 98.2 °F (36.8 °C)  Heart Rate:  [64-89] 75  Resp:  [18-20] 18  BP: (101-131)/(57-72) 123/71  Flow (L/min) (Oxygen Therapy):  [2-6] 2    Intake/Output Summary (Last 24 hours) at 2025 0824  Last data filed at 2025 1831  Gross per 24 hour   Intake 240 ml   Output 800 ml   Net -560 ml       Physical Exam:    General Appearance: alert, oriented x 3, no acute distress   Skin: warm and dry  HEENT: oral mucosa normal, nonicteric sclera  Neck: supple, no JVD  Lungs: Bibasilar crackles.  Heart: IRRR,   Abdomen: obese, soft, nontender, non distended  and positive bowel sounds.  : no palpable bladder  Extremities: Trace edema, no cyanosis or clubbing  Neuro: normal speech and mental status     Scheduled Meds:     Current Facility-Administered Medications   Medication Dose Route Frequency Provider Last Rate Last Admin    amiodarone (PACERONE) tablet 200 mg  200 mg Oral Q24H Deanne Bowles MD   200 mg at 04/11/25 0808    apixaban (ELIQUIS) tablet 2.5 mg  2.5 mg Oral Q12H Deanne Bowles MD   2.5 mg at 04/11/25 2213    benzocaine (AMERICAINE) 20 % rectal ointment   Rectal BID PRN Deanne Bowles MD   Given at 04/10/25 1607    cefepime 2000 mg IVPB in 100 mL NS (VTB)  2,000 mg Intravenous Q24H Deanne Bowles MD   2,000 mg at 04/11/25 1955    dextrose (D50W) (25 g/50 mL) IV injection 25 g  25 g Intravenous Q15 Min PRN Deanne Bowles MD        dextrose (GLUTOSE) oral gel 15 g  15 g Oral Q15 Min PRN Deanne Bowles MD        glucagon (GLUCAGEN) injection 1 mg  1 mg Intramuscular Q15 Min PRN Deanne Bowles MD        hydrOXYzine pamoate (VISTARIL) capsule 50 mg  50 mg Oral TID PRN Deanne Bowles MD   50 mg at 04/11/25 1658    Insulin Lispro (humaLOG) injection 2-9 Units  2-9 Units Subcutaneous 4x Daily AC & at Bedtime Deanne Bowles MD   4 Units at 04/11/25 2214    metoprolol succinate XL (TOPROL-XL) 24 hr tablet 25 mg  25 mg Oral Daily Deanne Bowles MD   25 mg at 04/09/25 1100    midodrine (PROAMATINE) tablet 5 mg  5 mg Oral TID AC Deanne Bowles MD   5 mg at 04/11/25 0808    mupirocin (BACTROBAN) 2 % nasal ointment 1 Application  1 Application Each Nare BID Deanne Bowles MD   1 Application at 04/11/25 2214    nitroglycerin (NITROSTAT) SL tablet 0.4 mg  0.4 mg Sublingual Q5 Min PRN Deanne Bowles MD        ondansetron (ZOFRAN) injection 4 mg  4 mg Intravenous Q6H PRN Deanne Bowles MD        Pharmacy Consult Cefepime   Not Applicable Continuous PRN Deanne Bowles MD        phenylephrine (JOSEPH-SYNEPHRINE) 50 mg in 250 mL NS infusion  0.5-3  mcg/kg/min Intravenous Titrated Deanne Bowles MD   Stopped at 04/09/25 2145    [Held by provider] pravastatin (PRAVACHOL) tablet 40 mg  40 mg Oral Daily Kerley, Brian Joseph, DO   40 mg at 04/08/25 1039    sodium chloride 0.9 % flush 10 mL  10 mL Intravenous Q12H Deanne Bowles MD   10 mL at 04/11/25 2214    sodium chloride 0.9 % flush 10 mL  10 mL Intravenous PRN Deanne Bowles MD        sodium chloride 0.9 % infusion 40 mL  40 mL Intravenous PRN Deanne Bowles MD           amiodarone, 200 mg, Oral, Q24H  apixaban, 2.5 mg, Oral, Q12H  cefepime, 2,000 mg, Intravenous, Q24H  insulin lispro, 2-9 Units, Subcutaneous, 4x Daily AC & at Bedtime  metoprolol succinate XL, 25 mg, Oral, Daily  midodrine, 5 mg, Oral, TID AC  mupirocin, 1 Application, Each Nare, BID  [Held by provider] pravastatin, 40 mg, Oral, Daily  sodium chloride, 10 mL, Intravenous, Q12H        IV Meds:   Pharmacy Consult,   phenylephrine, 0.5-3 mcg/kg/min, Last Rate: Stopped (04/09/25 2145)        Results Reviewed:   I have personally reviewed the results from the time of this admission to 4/12/2025 08:24 EDT     Results from last 7 days   Lab Units 04/12/25  0620 04/11/25  0414 04/10/25  0522 04/09/25  0506   SODIUM mmol/L 138 135* 135* 136   POTASSIUM mmol/L 4.2 4.0 3.8 4.5   CHLORIDE mmol/L 104 100 99 99   CO2 mmol/L 19.2* 20.2* 19.2* 20.0*   BUN mg/dL 81* 88* 91* 92*   CREATININE mg/dL 2.40* 2.61* 2.67* 2.64*   CALCIUM mg/dL 7.6* 7.2* 7.2* 7.5*   BILIRUBIN mg/dL  --  0.9 0.7 1.0   ALK PHOS U/L  --  148* 136* 151*   ALT (SGPT) U/L  --  47* 56* 72*   AST (SGOT) U/L  --  36 44* 67*   GLUCOSE mg/dL 83 109* 150* 120*       Estimated Creatinine Clearance: 22.9 mL/min (A) (by C-G formula based on SCr of 2.4 mg/dL (H)).    Results from last 7 days   Lab Units 04/07/25  1753   MAGNESIUM mg/dL 2.8*   PHOSPHORUS mg/dL 6.4*             Results from last 7 days   Lab Units 04/12/25  0620 04/11/25  0414 04/10/25  0522 04/09/25  0506 04/08/25  0441   WBC  "10*3/mm3 4.85 5.95 5.30 8.65 8.71   HEMOGLOBIN g/dL 11.5* 11.7* 11.1* 12.1* 12.0*   PLATELETS 10*3/mm3 87* 97* 99* 152 180             Brief Urine Lab Results  (Last result in the past 365 days)        Color   Clarity   Blood   Leuk Est   Nitrite   Protein   CREAT   Urine HCG        04/08/25 1823 Yellow   Clear   Negative   Small (1+)   Negative   30 mg/dL (1+)                   No results found for: \"UTPCR\"    Imaging Results (Last 24 Hours)       ** No results found for the last 24 hours. **                Assessment / Plan     ASSESSMENT:    COVID-19    Acute kidney injury: Patient does have significant worsening of renal function from his baseline likely hemodynamically mediated as well as cardiorenal syndrome.  Continue to optimize medications.  He does not have any significant electrolyte abnormalities bicarb is stable as well.  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  Valvular heart disease: Continue to optimize medications.  Coronary artery disease: Not a candidate for any aggressive cardiac workup, cardiac evaluation is in progress.  Congestive heart failure: Will need to optimize his volume status will adjust diuretics as needed.  Atrial fibrillation: Currently on amiodarone with some improvement in atrial fibrillation.  Type 2 diabetes: Continue with the sliding scale  Peripheral vascular disease: Continue to optimize medications no intervention planned.  Obstructive sleep apnea: Stable use of BiPAP.      PLAN:  Renal function is slightly better today.  Blood pressure is holding reasonably well.  I will go ahead and give 1 more dose of Lasix 40 mg IV and see if his blood pressure will tolerate likely will help improve his fluid status as well as renal function.  I will consider starting him on oral Lasix starting tomorrow.  Details were discussed with the " patient no family in the room.    Details were also discussed with the hospitalist service and or other providers as needed.  Palliative care team will have discussion with the patient and family.  No decisions have been made yet.  Continue with rest of the current treatment plan, and monitor with surveillance labs, adjust medication doses to the eGFR.  Further recommendations will depend on clinical course of the patient during the current hospitalization.   I have reviewed the copied text to this note, it was edited and the changes made as needed.  It is accurate to the point, when the note was signed today.     Thank you for involving us in the care of Blane Dietz.  Please feel free to call with any questions.    Andriy Foote MD, FASN  04/12/25  08:24 EDT    [facility]  [phone]      Part of this note may be an electronic transcription/translation of spoken language to printed text using the Dragon Dictation System.

## 2025-04-12 NOTE — PLAN OF CARE
Goal Outcome Evaluation:  Plan of Care Reviewed With: patient        Progress: improving  Outcome Evaluation: VSS. Pt on 2 liters nasal cannula.

## 2025-04-13 LAB
ANION GAP SERPL CALCULATED.3IONS-SCNC: 13.1 MMOL/L (ref 5–15)
BUN SERPL-MCNC: 80 MG/DL (ref 8–23)
BUN/CREAT SERPL: 33.3 (ref 7–25)
CALCIUM SPEC-SCNC: 7.7 MG/DL (ref 8.6–10.5)
CHLORIDE SERPL-SCNC: 103 MMOL/L (ref 98–107)
CO2 SERPL-SCNC: 21.9 MMOL/L (ref 22–29)
CREAT SERPL-MCNC: 2.4 MG/DL (ref 0.76–1.27)
DEPRECATED RDW RBC AUTO: 53 FL (ref 37–54)
EGFRCR SERPLBLD CKD-EPI 2021: 25.5 ML/MIN/1.73
ERYTHROCYTE [DISTWIDTH] IN BLOOD BY AUTOMATED COUNT: 16.6 % (ref 12.3–15.4)
GLUCOSE BLDC GLUCOMTR-MCNC: 108 MG/DL (ref 70–130)
GLUCOSE BLDC GLUCOMTR-MCNC: 119 MG/DL (ref 70–130)
GLUCOSE BLDC GLUCOMTR-MCNC: 217 MG/DL (ref 70–130)
GLUCOSE BLDC GLUCOMTR-MCNC: 256 MG/DL (ref 70–130)
GLUCOSE SERPL-MCNC: 110 MG/DL (ref 65–99)
HCT VFR BLD AUTO: 35.8 % (ref 37.5–51)
HGB BLD-MCNC: 11.5 G/DL (ref 13–17.7)
MCH RBC QN AUTO: 29 PG (ref 26.6–33)
MCHC RBC AUTO-ENTMCNC: 32.1 G/DL (ref 31.5–35.7)
MCV RBC AUTO: 90.4 FL (ref 79–97)
PLATELET # BLD AUTO: 71 10*3/MM3 (ref 140–450)
PMV BLD AUTO: 11.3 FL (ref 6–12)
POTASSIUM SERPL-SCNC: 4 MMOL/L (ref 3.5–5.2)
RBC # BLD AUTO: 3.96 10*6/MM3 (ref 4.14–5.8)
SODIUM SERPL-SCNC: 138 MMOL/L (ref 136–145)
WBC NRBC COR # BLD AUTO: 5.13 10*3/MM3 (ref 3.4–10.8)

## 2025-04-13 PROCEDURE — 82948 REAGENT STRIP/BLOOD GLUCOSE: CPT | Performed by: FAMILY MEDICINE

## 2025-04-13 PROCEDURE — 80048 BASIC METABOLIC PNL TOTAL CA: CPT | Performed by: FAMILY MEDICINE

## 2025-04-13 PROCEDURE — 63710000001 INSULIN LISPRO (HUMAN) PER 5 UNITS: Performed by: FAMILY MEDICINE

## 2025-04-13 PROCEDURE — 25010000002 CEFEPIME PER 500 MG: Performed by: FAMILY MEDICINE

## 2025-04-13 PROCEDURE — 82948 REAGENT STRIP/BLOOD GLUCOSE: CPT

## 2025-04-13 PROCEDURE — 99232 SBSQ HOSP IP/OBS MODERATE 35: CPT | Performed by: FAMILY MEDICINE

## 2025-04-13 PROCEDURE — 85027 COMPLETE CBC AUTOMATED: CPT | Performed by: FAMILY MEDICINE

## 2025-04-13 RX ADMIN — HYDROXYZINE PAMOATE 50 MG: 25 CAPSULE ORAL at 21:26

## 2025-04-13 RX ADMIN — Medication 10 ML: at 21:26

## 2025-04-13 RX ADMIN — INSULIN LISPRO 6 UNITS: 100 INJECTION, SOLUTION INTRAVENOUS; SUBCUTANEOUS at 21:27

## 2025-04-13 RX ADMIN — Medication 10 ML: at 21:27

## 2025-04-13 RX ADMIN — APIXABAN 2.5 MG: 2.5 TABLET, FILM COATED ORAL at 21:26

## 2025-04-13 RX ADMIN — Medication 10 ML: at 09:17

## 2025-04-13 RX ADMIN — MIDODRINE HYDROCHLORIDE 5 MG: 5 TABLET ORAL at 17:38

## 2025-04-13 RX ADMIN — APIXABAN 2.5 MG: 2.5 TABLET, FILM COATED ORAL at 09:17

## 2025-04-13 RX ADMIN — METOPROLOL SUCCINATE 25 MG: 25 TABLET, EXTENDED RELEASE ORAL at 09:17

## 2025-04-13 RX ADMIN — MIDODRINE HYDROCHLORIDE 5 MG: 5 TABLET ORAL at 08:07

## 2025-04-13 RX ADMIN — MIDODRINE HYDROCHLORIDE 5 MG: 5 TABLET ORAL at 12:10

## 2025-04-13 RX ADMIN — CEFEPIME 2000 MG: 2 INJECTION, POWDER, FOR SOLUTION INTRAVENOUS at 21:26

## 2025-04-13 RX ADMIN — INSULIN LISPRO 4 UNITS: 100 INJECTION, SOLUTION INTRAVENOUS; SUBCUTANEOUS at 12:10

## 2025-04-13 RX ADMIN — AMIODARONE HYDROCHLORIDE 200 MG: 200 TABLET ORAL at 09:17

## 2025-04-13 NOTE — PROGRESS NOTES
[facility] Progress Note  [facility]. [place]        Patient Name: Blane Dietz  : 1935  MRN: [id number]   LOS: 6 days    Patient Care Team:  David Em MD as PCP - General (Internal Medicine)  Ha Toussaint MD as Consulting Physician (Cardiology)  Shannan Ramon MD as Consulting Physician (Hematology and Oncology)  Xavier Boston MD as Consulting Physician (Endocrinology)  Brionna Weinstein DO as Surgeon (General Surgery)  Sophia Ferreira PA as Physician Assistant (Endocrinology)  Abbey Reyez APRN as Nurse Practitioner (Nurse Practitioner)    Chief Complaint:    Chief Complaint   Patient presents with    Shortness of Breath     Primary Care Physician:  David Em MD  Date of admission: 2025    Subjective     Interval History:   Follow-up acute on 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   Patient is awake alert and interactive denies having any chest pain shortness of breath.  He said he feels better.    Ongoing discussion with palliative care.    He said he feels good enough and wants to go home today.  Events noted from last 24 hours.  I reviewed the chart and other providers notes, labs and procedures done since my last note.    Review of Systems:   As noted above.    Objective     Vitals:   Temp:  [97.7 °F (36.5 °C)-98.6 °F (37 °C)] 97.7 °F (36.5 °C)  Heart Rate:  [70-74] 70  Resp:  [18-20] 18  BP: (100-124)/(60-91) 124/80    Intake/Output Summary (Last 24 hours) at 2025 0832  Last data filed at 2025 2331  Gross per 24 hour   Intake 460 ml   Output 400 ml   Net 60 ml       Physical Exam:    General Appearance: alert, oriented x 3, no acute distress   Skin: warm and dry  HEENT: oral mucosa normal, nonicteric sclera  Neck: supple, no JVD  Lungs: Bibasilar crackles.  Heart: IRRR,   Abdomen: obese, soft, nontender, non distended and positive bowel sounds.  : no palpable bladder  Extremities: Trace edema, no  cyanosis or clubbing  Neuro: normal speech and mental status     Scheduled Meds:     Current Facility-Administered Medications   Medication Dose Route Frequency Provider Last Rate Last Admin    amiodarone (PACERONE) tablet 200 mg  200 mg Oral Q24H Deanne Bowles MD   200 mg at 04/12/25 0851    apixaban (ELIQUIS) tablet 2.5 mg  2.5 mg Oral Q12H Deanne Bowles MD   2.5 mg at 04/12/25 2054    benzocaine (AMERICAINE) 20 % rectal ointment   Rectal BID PRN Deanne Bowles MD   Given at 04/10/25 1607    cefepime 2000 mg IVPB in 100 mL NS (VTB)  2,000 mg Intravenous Q24H Deanne Bowles MD   Stopped at 04/12/25 2331    dextrose (D50W) (25 g/50 mL) IV injection 25 g  25 g Intravenous Q15 Min PRN Deanne Bowles MD        dextrose (GLUTOSE) oral gel 15 g  15 g Oral Q15 Min PRN Deanne Bowles MD        glucagon (GLUCAGEN) injection 1 mg  1 mg Intramuscular Q15 Min PRN Deanne Bowles MD        hydrOXYzine pamoate (VISTARIL) capsule 50 mg  50 mg Oral TID PRN Deanne Bowles MD   50 mg at 04/11/25 1658    Insulin Lispro (humaLOG) injection 2-9 Units  2-9 Units Subcutaneous 4x Daily AC & at Bedtime Deanne Bowles MD   4 Units at 04/12/25 2054    metoprolol succinate XL (TOPROL-XL) 24 hr tablet 25 mg  25 mg Oral Daily Deanne Bowles MD   25 mg at 04/12/25 0851    midodrine (PROAMATINE) tablet 5 mg  5 mg Oral TID AC Deanne Bowles MD   5 mg at 04/13/25 0807    mupirocin (BACTROBAN) 2 % nasal ointment 1 Application  1 Application Each Nare BID Deanne Bowles MD   1 Application at 04/12/25 2054    nitroglycerin (NITROSTAT) SL tablet 0.4 mg  0.4 mg Sublingual Q5 Min PRN Deanne Bowles MD        ondansetron (ZOFRAN) injection 4 mg  4 mg Intravenous Q6H PRN Deanne Bowles MD        Pharmacy Consult Cefepime   Not Applicable Continuous PRN Deanne Bowles MD        phenylephrine (JOSEPH-SYNEPHRINE) 50 mg in 250 mL NS infusion  0.5-3 mcg/kg/min Intravenous Titrated Deanne Bowles MD   Stopped at 04/09/25 3406     [Held by provider] pravastatin (PRAVACHOL) tablet 40 mg  40 mg Oral Daily Kerley, Brian Franko,    40 mg at 04/08/25 1039    sodium chloride 0.9 % flush 10 mL  10 mL Intravenous Q12H Deanne Bowles MD   10 mL at 04/12/25 2054    sodium chloride 0.9 % flush 10 mL  10 mL Intravenous PRN Deanne Bowles MD        sodium chloride 0.9 % infusion 40 mL  40 mL Intravenous PRN Deanne Bowles MD           amiodarone, 200 mg, Oral, Q24H  apixaban, 2.5 mg, Oral, Q12H  cefepime, 2,000 mg, Intravenous, Q24H  insulin lispro, 2-9 Units, Subcutaneous, 4x Daily AC & at Bedtime  metoprolol succinate XL, 25 mg, Oral, Daily  midodrine, 5 mg, Oral, TID AC  mupirocin, 1 Application, Each Nare, BID  [Held by provider] pravastatin, 40 mg, Oral, Daily  sodium chloride, 10 mL, Intravenous, Q12H        IV Meds:   Pharmacy Consult,   phenylephrine, 0.5-3 mcg/kg/min, Last Rate: Stopped (04/09/25 2145)        Results Reviewed:   I have personally reviewed the results from the time of this admission to 4/13/2025 08:32 EDT     Results from last 7 days   Lab Units 04/13/25  0622 04/12/25  0620 04/11/25  0414 04/10/25  0522 04/09/25  0506   SODIUM mmol/L 138 138 135* 135* 136   POTASSIUM mmol/L 4.0 4.2 4.0 3.8 4.5   CHLORIDE mmol/L 103 104 100 99 99   CO2 mmol/L 21.9* 19.2* 20.2* 19.2* 20.0*   BUN mg/dL 80* 81* 88* 91* 92*   CREATININE mg/dL 2.40* 2.40* 2.61* 2.67* 2.64*   CALCIUM mg/dL 7.7* 7.6* 7.2* 7.2* 7.5*   BILIRUBIN mg/dL  --   --  0.9 0.7 1.0   ALK PHOS U/L  --   --  148* 136* 151*   ALT (SGPT) U/L  --   --  47* 56* 72*   AST (SGOT) U/L  --   --  36 44* 67*   GLUCOSE mg/dL 110* 83 109* 150* 120*       Estimated Creatinine Clearance: 22.9 mL/min (A) (by C-G formula based on SCr of 2.4 mg/dL (H)).    Results from last 7 days   Lab Units 04/07/25  1753   MAGNESIUM mg/dL 2.8*   PHOSPHORUS mg/dL 6.4*             Results from last 7 days   Lab Units 04/13/25  0622 04/12/25  0620 04/11/25  0414 04/10/25  0522 04/09/25  0506   WBC 10*3/mm3  "5.13 4.85 5.95 5.30 8.65   HEMOGLOBIN g/dL 11.5* 11.5* 11.7* 11.1* 12.1*   PLATELETS 10*3/mm3 71* 87* 97* 99* 152             Brief Urine Lab Results  (Last result in the past 365 days)        Color   Clarity   Blood   Leuk Est   Nitrite   Protein   CREAT   Urine HCG        04/08/25 1823 Yellow   Clear   Negative   Small (1+)   Negative   30 mg/dL (1+)                   No results found for: \"UTPCR\"    Imaging Results (Last 24 Hours)       ** No results found for the last 24 hours. **                Assessment / Plan     ASSESSMENT:    COVID-19    Acute kidney injury: Patient does have significant worsening of renal function from his baseline likely hemodynamically mediated as well as cardiorenal syndrome.  Continue to optimize medications.  He does not have any significant electrolyte abnormalities bicarb is stable as well.  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  Valvular heart disease: Continue to optimize medications.  Coronary artery disease: Not a candidate for any aggressive cardiac workup, cardiac evaluation is in progress.  Congestive heart failure: Will need to optimize his volume status will adjust diuretics as needed.  Atrial fibrillation: Currently on amiodarone with some improvement in atrial fibrillation.  Type 2 diabetes: Continue with the sliding scale  Peripheral vascular disease: Continue to optimize medications no intervention planned.  Obstructive sleep apnea: Stable use of BiPAP.      PLAN:  Renal function is stable and did not drop any further, her volume status looks good.  Blood pressure is holding fair, I will hold off giving any diuretics today, if patient goes home we will leave him on Lasix 40 mg on Mondays and Thursdays.  Details were discussed with the patient no family in the room.    Details were also discussed with the hospitalist service " and or other providers as needed.  Palliative care team will have discussion with the patient and family.  No decisions have been made yet.  Continue with rest of the current treatment plan, and monitor with surveillance labs, adjust medication doses to the eGFR.  Further recommendations will depend on clinical course of the patient during the current hospitalization.   I have reviewed the copied text to this note, it was edited and the changes made as needed.  It is accurate to the point, when the note was signed today.     Thank you for involving us in the care of Blane Dietz.  Please feel free to call with any questions.    Andriy Foote MD, FASN  04/13/25  08:32 EDT    [facility]  [phone]      Part of this note may be an electronic transcription/translation of spoken language to printed text using the Dragon Dictation System.

## 2025-04-13 NOTE — PLAN OF CARE
Goal Outcome Evaluation:           Progress: improving  Outcome Evaluation: wearing his c pap. states it feels better if he keeps it on. PT to work with pt. noted weakness when attempting to get pt on BSC

## 2025-04-13 NOTE — PROGRESS NOTES
[facility]    Name:  Blane Dietz   Age:  89 y.o.  Sex:  male  :  1935  MRN:  [id number]   Visit Number:  [id number]  Admission Date:  2025  Date Of Service:  25  Primary Care Physician:  David Em MD     LOS: 6 days :    Chief Complaint:      Shortness of air, N/V/D     Subjective:    Patient was seen examined bedside today.  Patient sitting up comfortably in the bed.  Patient reports overall improvement.  Denies any shortness of breath today.  Denies any pain or acute complaints otherwise. Blood pressure stable on midodrine.  He is on 2 L nasal cannula, afebrile.  Tolerated BiPAP overnight.  Patient appears anxious today and reports that he might consider going home if he has signed himself out AMA, he will talk to his daughter later today and decide.  Patient continues to appear generally weak.    Hospital Course:    Blane Dietz is an 89-year-old man with past medical history of heart failure reduced ejection fraction, moderate AR, moderate MR, type 2 diabetes, cirrhosis, hypertension, prostate cancer, aortic stenosis status post AVR, HUSSAIN on CPAP, hyperlipidemia, history of diffuse large B-cell lymphoma of kidney, CKD, atrial fibrillation.  Presented to [facility] ED on 2025 with concern for several weeks of various degrees of diarrhea, shortness of air with cough.  Family reports the patient actually has been off of this medication for several weeks.  Denied any chest pain, abdominal pain.  Says that he does feel somewhat short of air.  Denied any fevers or chills.     ED summary: Afebrile, atrial fibrillation with RVR rate as high as 160 resolved with cardioversion, hypotension resolved with fluids and Levophed, vital signs stable on 2 L baseline.  EKG initially atrial fibrillation with RVR rate 161.  After resolution sinus rhythm with first-degree AV block, right bundle branch block.  Troponin monitoring 1, 41, ACS not suspected.  proBNP  over 18,000.  Sodium 131, anion gap 13.5, BUN 95, creatinine 2.83, EGFR 20, blood glucose 246, calcium 8.0, magnesium 2.8, Phos 6.4, , ALT 77, bilirubin 1.3.  Lactate 2.7, procalcitonin 0.25, no leukocytosis, hemoglobin 12.2.  Blood cultures.  COVID-19 positive.  CT angio chest bilateral pleural effusions, no acute PE, 4.6 cm diameter dilated ascending aorta.  CT ab/pelvis findings suggesting decompensated cirrhotic portal hypertension, edematous gallbladder likely secondary to third spacing, thick-walled descending colon and distal rectum indeterminant.  He was provided IV amiodarone, vancomycin and cefepime, etomidate, Xylocaine, 2 L bolus.  Started on Levophed.  ED physician plans on placing central line.    Review of Systems:     All systems were reviewed and negative except as mentioned in subjective, assessment and plan.    Vital Signs:    Temp:  [97.8 °F (36.6 °C)-98.6 °F (37 °C)] 98 °F (36.7 °C)  Heart Rate:  [70-75] 70  Resp:  [18-20] 18  BP: (100-123)/(60-91) 118/74    Intake and output:    I/O last 3 completed shifts:  In: 840 [P.O.:840]  Out: 1200 [Urine:1200]  I/O this shift:  In: 100 [IV Piggyback:100]  Out: -     Physical Examination:    General Appearance:  Alert and cooperative.  Chronically ill-appearing.  No acute distress.  Generalized weakness noted.   Head:  Atraumatic and normocephalic.   Eyes: Conjunctivae and sclerae normal, no icterus. No pallor.   Throat: No oral lesions, no thrush, oral mucosa moist.   Neck: Supple, trachea midline, no thyromegaly.   Lungs:   Breath sounds heard bilaterally equally.  No wheezing.  Bibasilar rhonchi heard.  Decreased air movement bilaterally.  No Pleural rub or bronchial breathing.  Unlabored.  On supplemental oxygen.   Heart:  Normal S1 and S2, no murmur, no gallop, no rub. No JVD.   Abdomen:   Normal bowel sounds, no masses, no organomegaly. Soft, nontender, nondistended, no rebound tenderness.   Extremities: Supple, no edema, no cyanosis, no  "clubbing.    Skin: No bleeding.  Superficial chronic leg wound, see photo    Neurologic: Alert and oriented x 3. No facial asymmetry. Moves all four limbs. No tremors.      Laboratory results:    Results from last 7 days   Lab Units 04/12/25  0620 04/11/25 0414 04/10/25  0522 04/09/25  0506   SODIUM mmol/L 138 135* 135* 136   POTASSIUM mmol/L 4.2 4.0 3.8 4.5   CHLORIDE mmol/L 104 100 99 99   CO2 mmol/L 19.2* 20.2* 19.2* 20.0*   BUN mg/dL 81* 88* 91* 92*   CREATININE mg/dL 2.40* 2.61* 2.67* 2.64*   CALCIUM mg/dL 7.6* 7.2* 7.2* 7.5*   BILIRUBIN mg/dL  --  0.9 0.7 1.0   ALK PHOS U/L  --  148* 136* 151*   ALT (SGPT) U/L  --  47* 56* 72*   AST (SGOT) U/L  --  36 44* 67*   GLUCOSE mg/dL 83 109* 150* 120*     Results from last 7 days   Lab Units 04/12/25  0620 04/11/25 0414 04/10/25  0522   WBC 10*3/mm3 4.85 5.95 5.30   HEMOGLOBIN g/dL 11.5* 11.7* 11.1*   HEMATOCRIT % 35.5* 35.5* 33.6*   PLATELETS 10*3/mm3 87* 97* 99*         Results from last 7 days   Lab Units 04/07/25 1910 04/07/25  1753   HSTROP T ng/L 41* 101*     Results from last 7 days   Lab Units 04/07/25 1916 04/07/25 1910 04/06/25  1330   BLOODCX  No growth at 5 days No growth at 5 days  --    URINECX   --   --  50,000 CFU/mL Mixed Sissy Isolated     No results for input(s): \"PHART\", \"DMO8KJO\", \"PO2ART\", \"WUF6CPU\", \"BASEEXCESS\" in the last 8760 hours.   I have reviewed the patient's laboratory results.    Radiology results:    No radiology results from the last 24 hrs    I have reviewed the patient's radiology reports.    Medication Review:     I have reviewed the patient's active and prn medications.     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  Transaminitis  Hyperbilirubinemia  Chronic leg wound  AAA     Chronic: Heart failure reduced ejection " fraction, moderate AR, moderate MR, type 2 diabetes, cirrhosis, hypertension, prostate cancer, aortic stenosis status post AVR, HUSSAIN on CPAP, hyperlipidemia, history of diffuse large B-cell lymphoma of kidney, CKD, atrial fibrillation    Plan:    Inpatient ICU admission 4/7/2025 with atrial fibrillation with RVR status post cardioversion in ED, hypotension likely multifactorial requiring Levophed, elevated troponins ACS not suspected likely demand ischemia, COVID-19 infection on his baseline 2 L, acute exacerbation of heart failure reduced ejection fraction, hyponatremia, 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     Atrial fibrillation with RVR, resolved  -Cardiology consultation, recommendations appreciated.  -Status post cardioversion in the ED.  - Per cardiology commendations, continuing IV amiodarone for a total of 48-72 hours. Then can be changed to oral amiodarone therapy at 200 mg once per day.   - switched to p.o. amiodarone.    Elevated troponins  -ACS not suspected, likely demand ischemia.    Acute exacerbation of heart failure reduced ejection fraction  Bilateral pleural effusions.   -Previous 2D echo on file from December 2024 with EF of 38%  -Daily weight, strict ins and outs  -Diuretics per nephrology  -Cardiology consulted, appreciate recommendations    Hypotension/shock, improved  -Shock likely multifactorial given multiple chronic severe conditions, but could be related to sepsis  -Central line placed in ED  -added midodrine   -IV pressors titrated off.  - Transferred out of ICU and central line DC'd on 4/11    Colitis, suspected  -Ct showed: Thick-walled ascending colon and distal rectum are indeterminate. Correlate with signs or symptoms of colitis.  - Initially started vancomycin and cefepime on admission out of an abundance of caution.  -Blood cultures remains negative x 5 days  -Unsure if hypotension has an element of septic shock, likely multifactorial given  multiple chronic severe conditions.  -GI panel and C. Difficile both negative  -MRSA screen negative, discontinue vancomycin, continue Zosyn  -WBC WNL, afebrile  - Finish 5 days of cefepime    COVID-19 infection  Chronic respiratory failure  -On his 2 L baseline.  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  -Avoid nephrotoxic drugs  -Diuretics per nephrology  - I personally discussed with Dr. Foote today.    Transaminitis, improved  Hyperbilirubinemia, improved  Cirrhosis  -Findings on imaging suggesting decompensated cirrhotic portal hypertension. Edematous gallbladder is likely secondary to 3rd spacing of fluid rather than cholecystitis.  -Right upper quadrant ultrasound ordered and pending  - Abnormal LFTs likely secondary to chronic cirrhosis.  - Monitor liver function  - Held statin    Chronic leg wound  -Wound care.    AAA  -Ascending aorta 4.6 cm diameter. Similar to prior.   -Recommend follow-up.     Chronic: Heart failure reduced ejection fraction, moderate AR, moderate MR, type 2 diabetes, cirrhosis, hypertension, prostate cancer, aortic stenosis status post AVR, HUSSAIN on CPAP, hyperlipidemia, history of diffuse large B-cell lymphoma of kidney, CKD, atrial fibrillation  -Subcutaneous insulin protocol.  -Continue other home medications.     -I have reviewed the copied text and it is accurate as of 4/13/2025     -Palliative care consulted for further discussions on goals of care, patient does not want any escalation of care, he is agreeable for hospice at this point but still undecided if his going to do home with hospice versus hospice care center.  Will see how patient will progress over the next day or 2 and decide based on that.    DVT Prophylaxis: Eliquis  Code Status: DNR/DNI  Diet: Cardiac/renal  Discharge Plan: To be determined, likely  needs 1 to 2 days in the hospital.  Palliative care to discuss on Monday.    Deanne Bowles MD  04/13/25  06:54 EDT    Dictated utilizing Dragon dictation.

## 2025-04-14 VITALS
HEIGHT: 66 IN | TEMPERATURE: 98.5 F | RESPIRATION RATE: 18 BRPM | SYSTOLIC BLOOD PRESSURE: 125 MMHG | WEIGHT: 201.06 LBS | OXYGEN SATURATION: 9 % | HEART RATE: 67 BPM | BODY MASS INDEX: 32.31 KG/M2 | DIASTOLIC BLOOD PRESSURE: 68 MMHG

## 2025-04-14 LAB
ANION GAP SERPL CALCULATED.3IONS-SCNC: 16.5 MMOL/L (ref 5–15)
BUN SERPL-MCNC: 75 MG/DL (ref 8–23)
BUN/CREAT SERPL: 28.2 (ref 7–25)
CALCIUM SPEC-SCNC: 8 MG/DL (ref 8.6–10.5)
CHLORIDE SERPL-SCNC: 102 MMOL/L (ref 98–107)
CO2 SERPL-SCNC: 20.5 MMOL/L (ref 22–29)
CREAT SERPL-MCNC: 2.66 MG/DL (ref 0.76–1.27)
DEPRECATED RDW RBC AUTO: 52.6 FL (ref 37–54)
EGFRCR SERPLBLD CKD-EPI 2021: 22.5 ML/MIN/1.73
ERYTHROCYTE [DISTWIDTH] IN BLOOD BY AUTOMATED COUNT: 16.6 % (ref 12.3–15.4)
GLUCOSE BLDC GLUCOMTR-MCNC: 150 MG/DL (ref 70–130)
GLUCOSE BLDC GLUCOMTR-MCNC: 207 MG/DL (ref 70–130)
GLUCOSE BLDC GLUCOMTR-MCNC: 81 MG/DL (ref 70–130)
GLUCOSE BLDC GLUCOMTR-MCNC: 99 MG/DL (ref 70–130)
GLUCOSE SERPL-MCNC: 152 MG/DL (ref 65–99)
HCT VFR BLD AUTO: 35.7 % (ref 37.5–51)
HGB BLD-MCNC: 11.6 G/DL (ref 13–17.7)
MCH RBC QN AUTO: 29.3 PG (ref 26.6–33)
MCHC RBC AUTO-ENTMCNC: 32.5 G/DL (ref 31.5–35.7)
MCV RBC AUTO: 90.2 FL (ref 79–97)
PLATELET # BLD AUTO: 69 10*3/MM3 (ref 140–450)
PMV BLD AUTO: 10.9 FL (ref 6–12)
POTASSIUM SERPL-SCNC: 4.4 MMOL/L (ref 3.5–5.2)
RBC # BLD AUTO: 3.96 10*6/MM3 (ref 4.14–5.8)
SODIUM SERPL-SCNC: 139 MMOL/L (ref 136–145)
WBC NRBC COR # BLD AUTO: 5.44 10*3/MM3 (ref 3.4–10.8)

## 2025-04-14 PROCEDURE — 82948 REAGENT STRIP/BLOOD GLUCOSE: CPT | Performed by: FAMILY MEDICINE

## 2025-04-14 PROCEDURE — 63710000001 INSULIN LISPRO (HUMAN) PER 5 UNITS: Performed by: FAMILY MEDICINE

## 2025-04-14 PROCEDURE — 85027 COMPLETE CBC AUTOMATED: CPT | Performed by: FAMILY MEDICINE

## 2025-04-14 PROCEDURE — 99233 SBSQ HOSP IP/OBS HIGH 50: CPT | Performed by: PHYSICIAN ASSISTANT

## 2025-04-14 PROCEDURE — 80048 BASIC METABOLIC PNL TOTAL CA: CPT | Performed by: FAMILY MEDICINE

## 2025-04-14 PROCEDURE — 99232 SBSQ HOSP IP/OBS MODERATE 35: CPT | Performed by: FAMILY MEDICINE

## 2025-04-14 PROCEDURE — 82948 REAGENT STRIP/BLOOD GLUCOSE: CPT

## 2025-04-14 RX ADMIN — AMIODARONE HYDROCHLORIDE 200 MG: 200 TABLET ORAL at 09:10

## 2025-04-14 RX ADMIN — MIDODRINE HYDROCHLORIDE 5 MG: 5 TABLET ORAL at 16:04

## 2025-04-14 RX ADMIN — Medication 10 ML: at 21:55

## 2025-04-14 RX ADMIN — INSULIN LISPRO 2 UNITS: 100 INJECTION, SOLUTION INTRAVENOUS; SUBCUTANEOUS at 17:07

## 2025-04-14 RX ADMIN — METOPROLOL SUCCINATE 25 MG: 25 TABLET, EXTENDED RELEASE ORAL at 09:10

## 2025-04-14 RX ADMIN — Medication 10 ML: at 09:11

## 2025-04-14 RX ADMIN — HYDROXYZINE PAMOATE 50 MG: 25 CAPSULE ORAL at 01:15

## 2025-04-14 RX ADMIN — HYDROXYZINE PAMOATE 50 MG: 25 CAPSULE ORAL at 09:10

## 2025-04-14 RX ADMIN — APIXABAN 2.5 MG: 2.5 TABLET, FILM COATED ORAL at 09:10

## 2025-04-14 RX ADMIN — MIDODRINE HYDROCHLORIDE 5 MG: 5 TABLET ORAL at 11:52

## 2025-04-14 RX ADMIN — INSULIN LISPRO 4 UNITS: 100 INJECTION, SOLUTION INTRAVENOUS; SUBCUTANEOUS at 11:52

## 2025-04-14 NOTE — DISCHARGE PLACEMENT REQUEST
"LTC w/ hospice private pay -- wanting private room     Rc Blandon \"CHEO\" (89 y.o. Male)       Date of Birth   1935    Social Security Number       Address   [address]  [facility] [address]    Home Phone   [phone]    MRN   [id number]       Mandaeism   Presbyterian    Marital Status   Single                            Admission Date   2025    Admission Type   Emergency    Admitting Provider   Kerley, Brian Joseph, DO    Attending Provider   Deanne Bowles MD    Department, Room/Bed   [facility] TELEMETRY 3, 304/1       Discharge Date       Discharge Disposition       Discharge Destination                                 Attending Provider: Deanne Bowles MD    Allergies: Ampicillin, Medrol [Methylprednisolone], Myrbetriq [Mirabegron], Penicillins, Bee Venom, Melatonin    Isolation: Enh Drop/Con   Infection: COVID (confirmed) (25)   Code Status: No CPR    Ht: 167.6 cm (66\")   Wt: 91.2 kg (201 lb 1 oz)    Admission Cmt: None   Principal Problem: COVID-19 [U07.1]                   Active Insurance as of 2025       Primary Coverage       Payor Plan Insurance Group Employer/Plan Group    HUMANA MEDICARE REPLACEMENT HU[employer]NA MEDICARE ADVANTAGE GROUP [id number]       Payor Plan Address Payor Plan Phone Number Payor Plan Fax Number Effective Dates    [address] [address] [phone]  2018 - None Entered    Former[facility] [address]         Subscriber Name Subscriber Birth Date Member ID       RC BLANDON 1935 [id number]                     Emergency Contacts        (Rel.) Home Phone Work Phone Mobile Phone    Susana Blandon (Power of ) [phone] -- [phone]    CLIFFORD WHITAKER (Sister) [phone] -- --                 History & Physical        Kerley, Brian Joseph, DO at 25 2256            [facility] HOSPITALIST   HISTORY AND PHYSICAL      Name:  Rc Blandon   Age:  89 y.o.  Sex:  male  :  1935  MRN:  [id number] "   Visit Number:  [id number]  Admission Date:  4/7/2025  Date Of Service:  04/07/25  Primary Care Physician:  David Em MD    Chief Complaint:     Shortness of air, N/V/D    History Of Presenting Illness:      Blane Dietz is an 89-year-old man with past medical history of heart failure reduced ejection fraction, moderate AR, moderate MR, type 2 diabetes, cirrhosis, hypertension, prostate cancer, aortic stenosis status post AVR, HUSSAIN on CPAP, hyperlipidemia, history of diffuse large B-cell lymphoma of kidney, CKD, atrial fibrillation.  Presented to [facility] ED on 4/7/2025 with concern for several weeks of various degrees of diarrhea, shortness of air with cough.  Family reports the patient actually has been off of this medication for several weeks.  Denied any chest pain, abdominal pain.  Says that he does feel somewhat short of air.  Denied any fevers or chills.    ED summary: Afebrile, atrial fibrillation with RVR rate as high as 160 resolved with cardioversion, hypotension resolved with fluids and Levophed, vital signs stable on 2 L baseline.  EKG initially atrial fibrillation with RVR rate 161.  After resolution sinus rhythm with first-degree AV block, right bundle branch block.  Troponin monitoring 1, 41, ACS not suspected.  proBNP over 18,000.  Sodium 131, anion gap 13.5, BUN 95, creatinine 2.83, EGFR 20, blood glucose 246, calcium 8.0, magnesium 2.8, Phos 6.4, , ALT 77, bilirubin 1.3.  Lactate 2.7, procalcitonin 0.25, no leukocytosis, hemoglobin 12.2.  Blood cultures.  COVID-19 positive.  CT angio chest bilateral pleural effusions, no acute PE, 4.6 cm diameter dilated ascending aorta.  CT ab/pelvis findings suggesting decompensated cirrhotic portal hypertension, edematous gallbladder likely secondary to third spacing, thick-walled descending colon and distal rectum indeterminant.  He was provided IV amiodarone, vancomycin and cefepime, etomidate, Xylocaine, 2 L bolus.  Started on Levophed.  ED  physician plans on placing central line.    Review Of Systems:    All systems were reviewed and negative except as mentioned in history of presenting illness, assessment and plan.    Past Medical History: Patient  has a past medical history of Aortic stenosis, Arthritis, Bilateral impacted cerumen (11/23/2016), Bronchitis, CHF (congestive heart failure), Chronic gout of right foot (06/13/2016), COVID-19 vaccine series completed (05/12/2021), Depression, Diabetes mellitus, Diverticulitis, Exogenous obesity, Gout, Heart murmur, History of vitamin D deficiency, Hypercholesteremia (08/11/2016), Hyperlipidemia, Hypertension, Impaired mobility, Kidney lesion, Malignant neoplasm of prostate, Morbid obesity (08/11/2016), Pneumonia (05/12/2021), Primary osteoarthritis involving multiple joints (06/13/2016), Pulmonary embolism, Sleep apnea (05/12/2021), Uncontrolled type 2 diabetes mellitus with hyperglycemia (06/13/2016), and Vertigo.    Past Surgical History: Patient  has a past surgical history that includes Colostomy (1999); Other surgical history; Exploratory laparotomy; Colonoscopy; Revision Colostomy; Prostate surgery; Prostatectomy (2000); Colon surgery; Tonsillectomy; Skin cancer excision; Lipoma Excision; Mohs surgery; Cardiac valve replacement (05/12/2021); nephroureterectomy (Right, 12/7/2021); and Cystoscopy (N/A, 12/7/2021).    Social History: Patient  reports that he has never smoked. He has never been exposed to tobacco smoke. He has never used smokeless tobacco. He reports that he does not drink alcohol and does not use drugs.    Family History:  Patient's family history has been reviewed and found to be noncontributory.     Allergies:      Ampicillin, Medrol [methylprednisolone], Myrbetriq [mirabegron], Penicillins, Bee venom, and Melatonin    Home Medications:    Prior to Admission Medications       Prescriptions Last Dose Informant Patient Reported? Taking?    allopurinol (ZYLOPRIM) 100 MG tablet   No No  "   Take 1 tablet by mouth Daily. Hold for now.  Can restart Sunday.  Indications: Gout    amiodarone (PACERONE) 200 MG tablet   Yes No    Take 1 tablet by mouth Daily.    Apoaequorin (PREVAGEN PO)   Yes No    Take  by mouth.    ascorbic acid (VITAMIN C) 1000 MG tablet   Yes No    Take 1 tablet by mouth Daily.    Calcium Carb-Cholecalciferol 600-5 MG-MCG tablet   No No    Take 1 tablet by mouth Every Other Day. Indications: Low Amount of Calcium in the Blood    CINNAMON PO   Yes No    Take 1 tablet by mouth Daily. Patient unsure of dose    Droplet Insulin Syringe 31G X 5/16\" 0.5 ML misc   Yes No    empagliflozin (Jardiance) 25 MG tablet tablet   No No    Take 1 tablet by mouth Daily. Hold for now.  Can restart Sunday.    enzalutamide (XTANDI) 40 MG tablet tablet   No No    Take 3 tablets by mouth Daily.    fluticasone (Flonase) 50 MCG/ACT nasal spray   No No    Administer 2 sprays into the nostril(s) as directed by provider Daily As Needed for Rhinitis or Allergies.    furosemide (LASIX) 40 MG tablet   No No    Take 1 tablet by mouth Daily. Hold for now.  Can restart Sunday.    glucose blood (True Metrix Blood Glucose Test) test strip   No No    Use to test blood sugar 3 times daily.  Dx E11.65    Lantus 100 UNIT/ML injection   No No    Inject up to 50 units daily subcutaneously    Patient taking differently:  Inject up to 20 units daily subcutaneously    metoprolol succinate XL (TOPROL-XL) 25 MG 24 hr tablet   No No    Take 1 tablet by mouth Daily.    pravastatin (PRAVACHOL) 40 MG tablet   No No    TAKE ONE TABLET BY MOUTH EVERY NIGHT AT BEDTIME    sacubitril-valsartan (Entresto) 24-26 MG tablet   No No    Take 1 tablet by mouth Every 12 (Twelve) Hours. Hold for now- kidneys unable to tolerate  Indications: Cardiac Failure    VITAMIN D, CHOLECALCIFEROL, PO   Yes No    Take 1,000 Units by mouth Daily.          ED Medications:    Medications   amiodarone 150 mg in 100 mL D5W (loading dose) (0 mg Intravenous Stopped " "4/7/25 1821)     Followed by   amiodarone 360 mg in 200 mL D5W infusion (1 mg/min Intravenous Currently Infusing 4/7/25 2233)     Followed by   amiodarone 360 mg in 200 mL D5W infusion (has no administration in time range)   norepinephrine (LEVOPHED) 8 mg in 250mL D5W infusion (0.04 mcg/kg/min × 90.7 kg Intravenous Currently Infusing 4/7/25 2215)   lidocaine (XYLOCAINE) 5 % ointment ( Topical Given 4/7/25 1815)   sodium chloride 0.9 % bolus 500 mL (0 mL Intravenous Stopped 4/7/25 1845)   etomidate (AMIDATE) injection 9 mg (9 mg Intravenous Given 4/7/25 1835)   sodium chloride 0.9 % bolus 500 mL (0 mL Intravenous Stopped 4/7/25 1927)   sodium chloride 0.9 % bolus 1,000 mL (0 mL Intravenous Stopped 4/7/25 1958)   cefepime 2000 mg IVPB in 100 mL NS (VTB) (0 mg Intravenous Stopped 4/7/25 1956)   vancomycin 1750 mg/500 mL 0.9% NS IVPB ([facility]) (0 mg Intravenous Stopped 4/7/25 2137)   iopamidol (ISOVUE-300) 61 % injection 100 mL (100 mL Intravenous Given 4/7/25 2107)     Vital Signs:  Temp:  [96.5 °F (35.8 °C)-97.2 °F (36.2 °C)] 97.2 °F (36.2 °C)  Heart Rate:  [] 64  Resp:  [16] 16  BP: ()/(41-92) 115/59        04/07/25  1732   Weight: 90.7 kg (200 lb)     Body mass index is 32.28 kg/m².    Physical Exam:     Most recent vital Signs: /59   Pulse 64   Temp 97.2 °F (36.2 °C) (Oral)   Resp 16   Ht 167.6 cm (66\")   Wt 90.7 kg (200 lb)   SpO2 98%   BMI 32.28 kg/m²     Physical Exam  Constitutional:       General: He is not in acute distress.     Appearance: He is ill-appearing. He is not toxic-appearing.   HENT:      Mouth/Throat:      Mouth: Mucous membranes are moist.   Eyes:      Extraocular Movements: Extraocular movements intact.   Cardiovascular:      Rate and Rhythm: Normal rate and regular rhythm.      Pulses: Normal pulses.      Heart sounds: Normal heart sounds.   Pulmonary:      Effort: Pulmonary effort is normal.      Breath sounds: Normal breath sounds.   Abdominal:      Palpations: Abdomen " is soft.      Tenderness: There is no abdominal tenderness.   Musculoskeletal:      Right lower leg: No edema.      Left lower leg: No edema.      Comments: Superficial chronic leg wound, see photo   Skin:     General: Skin is warm.   Neurological:      General: No focal deficit present.      Mental Status: He is alert and oriented to person, place, and time.   Psychiatric:         Mood and Affect: Mood normal.         Thought Content: Thought content normal.         Laboratory data:    I have reviewed the labs done in the emergency room.    Results from last 7 days   Lab Units 04/07/25  1753   SODIUM mmol/L 131*   POTASSIUM mmol/L 5.0   CHLORIDE mmol/L 94*   CO2 mmol/L 21.5*   BUN mg/dL 95*   CREATININE mg/dL 2.83*   CALCIUM mg/dL 8.0*   BILIRUBIN mg/dL 1.3*   ALK PHOS U/L 184*   ALT (SGPT) U/L 77*   AST (SGOT) U/L 88*   GLUCOSE mg/dL 146*     Results from last 7 days   Lab Units 04/07/25  1753   WBC 10*3/mm3 6.87   HEMOGLOBIN g/dL 12.2*   HEMATOCRIT % 37.1*   PLATELETS 10*3/mm3 137*         Results from last 7 days   Lab Units 04/07/25  1910 04/07/25  1753   HSTROP T ng/L 41* 101*     Results from last 7 days   Lab Units 04/07/25  1753   PROBNP pg/mL 18,393.0*                 Results from last 7 days   Lab Units 04/06/25  1330   COLOR UA  Dark Yellow*   GLUCOSE UA  Negative   KETONES UA  Negative   BLOOD UA  Negative   LEUKOCYTES UA  Small (1+)*   PH, URINE  <=5.0   BILIRUBIN UA  Negative   UROBILINOGEN UA  1.0 E.U./dL   RBC UA /HPF 0-2   WBC UA /HPF 6-10*       Pain Management Panel  More data exists         4/4/2024 12/29/2022   Pain Management Panel   Creatinine, Urine 10  72.7          Radiology:    CT Abdomen Pelvis With Contrast  Result Date: 4/7/2025  FINAL REPORT TECHNIQUE: null CLINICAL HISTORY: Hypotension, shock COMPARISON: null FINDINGS: CT abdomen and pelvis with contrast Comparison: CT - CT ANGIOGRAM CHEST PULMONARY EMBOLISM - 4/7/25 20:55 EDT Findings: Large right and small left pleural effusions  with associated atelectasis. Cardiomegaly. Relatively small lobulated liver. No bile duct dilatation. Main portal vein 1.7 cm diameter with recanalized umbilical vein collateral. 2 mm nonobstructing left inferior renal stone. Subcentimeter left mid renal cyst. Right nephrectomy. Edematous gallbladder. No bile duct dilatation. Abdominal solid organs otherwise unremarkable. There is wall thickening of the ascending colon and distal rectum with no inflammatory change. No bowel obstruction, pneumoperitoneum, or pneumatosis. Minimal upper abdominal ascites. Pelvic contents unremarkable. Normal appendix. Prostatectomy. Urinary bladder is partially decompressed and mildly thick-walled as a result. Small right superior urinary bladder diverticulum. Grade 1 chronic dyspneic L5-S1 spondylolisthesis. Moderate to severe lumbar degenerative spondylosis. No acute fracture. Generalized edema, especially in the subcutaneous tissues.     IMPRESSION: 1. 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 2. Edematous gallbladder is likely secondary to 3rd spacing of fluid rather than cholecystitis. 3. Thick-walled ascending colon and distal rectum are indeterminate. Correlate with any signs or symptoms of colitis. 4. Additional findings as above. Authenticated and Electronically Signed by Ventura Obando MD on 04/07/2025 09:55:20 PM    CT Angiogram Chest Pulmonary Embolism  Result Date: 4/7/2025  FINAL REPORT TECHNIQUE: null CLINICAL HISTORY: Hypotension, shock COMPARISON: null FINDINGS: CT angiography chest with contrast. 3D Postprocessing. Comparison: CT - CT ABDOMEN PELVIS W CONTRAST - 4/7/25 20:55 EDT CT/SR - CT ANGIOGRAM CHEST PULMONARY EMBOLISM - 12/21/24 19:34 EST Findings: Cardiomegaly. No pericardial effusion. RV/LV ratio normal. Ascending aorta 4.6 cm diameter. Lumen is not opacified. Similar to prior. Enlarged pulmonary artery. This can be seen with pulmonary artery hypertension. The visualized thyroid and  mediastinum are unremarkable. Lower lobe pulmonary arteries are not opacified. Likely due to poor cardiac output. Large right and small left pleural effusions. Dependent atelectasis both lungs, more prominent on the right. No pneumothorax. Abdominal findings are discussed on the same day comparison. No acute fractures.     IMPRESSION: 1. Bilateral pleural effusions. Given the abnormal findings, this could be secondary to decompensated cirrhotic portal hypertension. 2. No acute pulmonary embolus within the limitations of the exam. 3. 4.6 cm diameter dilated ascending aorta. Recommend follow-up as needed. 4. Additional findings as above. Authenticated and Electronically Signed by Ventura Obando MD on 04/07/2025 09:54:16 PM    XR Chest 1 View  Result Date: 4/7/2025  FINAL REPORT TECHNIQUE: null CLINICAL HISTORY: SOA COMPARISON: null FINDINGS: Single view of the chest. COMPARISON: None FINDINGS: Status post sternotomy. Cardiomegaly. Atherosclerotic thoracic aorta. Mild prominence of the central pulmonary vasculature. Blunting of the right costophrenic angle. No pneumothorax. No consolidation. No fracture identified. Mild spondylosis.     IMPRESSION: 1. Small right pleural effusion. 2. Cardiomegaly with likely mild pulmonary vascular congestion. Authenticated and Electronically Signed by Jessee Scott MD on 04/07/2025 07:09:41 PM      Assessment/Plan:    Inpatient ICU admission 4/7/2025 with atrial fibrillation with RVR status post cardioversion in ED, hypotension likely multifactorial requiring Levophed, elevated troponins ACS not suspected likely demand ischemia, COVID-19 infection on his baseline 2 L, acute exacerbation of heart failure reduced ejection fraction, hyponatremia, 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    Atrial fibrillation with RVR, resolved  Amiodarone IV.  Cardiology consultation, recommendations appreciated.  Status post cardioversion in the  ED.  Hypotension  Levophed.  Central line placed in ED.  Received 2L IVF in ED.  Colitis, suspected  UTI  Vancomycin and Zosyn out of an abundance of caution.  Blood cultures.  Add urine culture.  Unsure if hypotension has an element of septic shock, likely multifactorial given multiple chronic severe conditions.  Elevated troponins  ACS not suspected, likely demand ischemia.  COVID-19 infection  On his 2 L baseline.  Acute exacerbation of heart failure reduced ejection fraction  Hyponatremia  GERALD on CKD  Nephrology consultation, recommendations appreciated.  Lasix.  Transaminitis  Hyperbilirubinemia  Right upper quadrant ultrasound.  Likely secondary to chronic cirrhosis.  Chronic leg wound  Wound care.  AAA  Recommend follow-up.    Chronic: Heart failure reduced ejection fraction, moderate AR, moderate MR, type 2 diabetes, cirrhosis, hypertension, prostate cancer, aortic stenosis status post AVR, HUSSAIN on CPAP, hyperlipidemia, history of diffuse large B-cell lymphoma of kidney, CKD, atrial fibrillation  Subcutaneous insulin protocol.  Continue other home medications.    Risk Assessment: High  DVT Prophylaxis: Eliquis  Code Status: DNR/DNI  Diet: N.p.o.    Advance Care Planning  ACP discussion was held with the patient during this visit. Patient does not have an advance directive, information provided.           Brian Joseph Kerley, DO  04/07/25  22:59 EDT    Dictated utilizing Dragon dictation.    Electronically signed by Kerley, Brian Joseph, DO at 04/08/25 0058       Current Facility-Administered Medications   Medication Dose Route Frequency Provider Last Rate Last Admin    amiodarone (PACERONE) tablet 200 mg  200 mg Oral Q24H Deanne Bowles MD   200 mg at 04/14/25 0910    apixaban (ELIQUIS) tablet 2.5 mg  2.5 mg Oral Q12H Deanne Bowles MD   2.5 mg at 04/14/25 0910    benzocaine (AMERICAINE) 20 % rectal ointment   Rectal BID PRN Deanne Bowles MD   Given at 04/10/25 1607    dextrose (D50W) (25 g/50 mL) IV  injection 25 g  25 g Intravenous Q15 Min PRN Deanne Bowles MD        dextrose (GLUTOSE) oral gel 15 g  15 g Oral Q15 Min PRN Deanne Bowles MD        glucagon (GLUCAGEN) injection 1 mg  1 mg Intramuscular Q15 Min PRN Deanne Bowles MD        hydrOXYzine pamoate (VISTARIL) capsule 50 mg  50 mg Oral TID PRN Deanne Bowles MD   50 mg at 25 0910    Insulin Lispro (humaLOG) injection 2-9 Units  2-9 Units Subcutaneous 4x Daily AC & at Bedtime Deanne Bowles MD   4 Units at 25 1152    metoprolol succinate XL (TOPROL-XL) 24 hr tablet 25 mg  25 mg Oral Daily Deanne Bowles MD   25 mg at 25 0910    midodrine (PROAMATINE) tablet 5 mg  5 mg Oral TID AC Deanne Bowles MD   5 mg at 25 1152    nitroglycerin (NITROSTAT) SL tablet 0.4 mg  0.4 mg Sublingual Q5 Min PRN Deanne Bowles MD        ondansetron (ZOFRAN) injection 4 mg  4 mg Intravenous Q6H PRN Deanne Bowles MD        [Held by provider] pravastatin (PRAVACHOL) tablet 40 mg  40 mg Oral Daily Kerley, Brian Joseph, DO   40 mg at 25 1039    sodium chloride 0.9 % flush 10 mL  10 mL Intravenous Q12H Deanne Bowles MD   10 mL at 25 0911    sodium chloride 0.9 % flush 10 mL  10 mL Intravenous PRN Deanne Bowles MD   10 mL at 25 2126    sodium chloride 0.9 % infusion 40 mL  40 mL Intravenous PRN Deanne Bowles MD            Physician Progress Notes (most recent note)        Andriy Foote MD, FASN at 25 0559                [facility] Progress Note  [facility]. [place]        Patient Name: Blane Dietz  : 1935  MRN: [id number]   LOS: 7 days    Patient Care Team:  David Em MD as PCP - General (Internal Medicine)  Ha Toussaint MD as Consulting Physician (Cardiology)  Shannan Ramon MD as Consulting Physician (Hematology and Oncology)  Xavier Boston MD as Consulting Physician (Endocrinology)  Brionna Weinstein DO as Surgeon (General  Surgery)  Sophia Ferreira PA as Physician Assistant (Endocrinology)  Abbey Reyez APRN as Nurse Practitioner (Nurse Practitioner)    Chief Complaint:    Chief Complaint   Patient presents with    Shortness of Breath     Primary Care Physician:  David Em MD  Date of admission: 4/7/2025    Subjective     Interval History:   Follow-up acute on 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   Patient is awake alert and interactive denies having any chest pain shortness of breath.  He said he feels better.    Ongoing discussion with palliative care.    He said he feels good enough and wants to go home today.  Events noted from last 24 hours.  I reviewed the chart and other providers notes, labs and procedures done since my last note.    Review of Systems:   As noted above.    Objective     Vitals:   Temp:  [97.2 °F (36.2 °C)-98.5 °F (36.9 °C)] 98.5 °F (36.9 °C)  Heart Rate:  [69-70] 70  Resp:  [16-18] 18  BP: ()/(63-87) 87/74  Flow (L/min) (Oxygen Therapy):  [2] 2    Intake/Output Summary (Last 24 hours) at 4/14/2025 0559  Last data filed at 4/13/2025 1740  Gross per 24 hour   Intake 720 ml   Output --   Net 720 ml       Physical Exam:    General Appearance: alert, oriented x 3, no acute distress   Skin: warm and dry  HEENT: oral mucosa normal, nonicteric sclera  Neck: supple, no JVD  Lungs: Decreased breath sounds with occasional basal crackles.  Heart: IRRR,   Abdomen: obese, soft, nontender, non distended and positive bowel sounds.  : no palpable bladder  Extremities: Trace edema, no cyanosis or clubbing  Neuro: normal speech and mental status     Scheduled Meds:     Current Facility-Administered Medications   Medication Dose Route Frequency Provider Last Rate Last Admin    amiodarone (PACERONE) tablet 200 mg  200 mg Oral Q24H Deanne Bowles MD   200 mg at 04/13/25 0917    apixaban (ELIQUIS) tablet 2.5 mg  2.5 mg Oral Q12H Deanne Bowles MD   2.5 mg at 04/13/25 2126    benzocaine (AMERICAINE) 20  % rectal ointment   Rectal BID PRN Deanne Bowles MD   Given at 04/10/25 1607    dextrose (D50W) (25 g/50 mL) IV injection 25 g  25 g Intravenous Q15 Min PRN Deanne Bowles MD        dextrose (GLUTOSE) oral gel 15 g  15 g Oral Q15 Min PRN Deanne Bowles MD        glucagon (GLUCAGEN) injection 1 mg  1 mg Intramuscular Q15 Min PRN Deanne Bowles MD        hydrOXYzine pamoate (VISTARIL) capsule 50 mg  50 mg Oral TID PRN Deanne Bowles MD   50 mg at 04/14/25 0115    Insulin Lispro (humaLOG) injection 2-9 Units  2-9 Units Subcutaneous 4x Daily AC & at Bedtime Deanne Bowles MD   6 Units at 04/13/25 2127    metoprolol succinate XL (TOPROL-XL) 24 hr tablet 25 mg  25 mg Oral Daily Deanne Bowles MD   25 mg at 04/13/25 0917    midodrine (PROAMATINE) tablet 5 mg  5 mg Oral TID AC Deanne Bowles MD   5 mg at 04/13/25 1738    nitroglycerin (NITROSTAT) SL tablet 0.4 mg  0.4 mg Sublingual Q5 Min PRN Deanne Bowles MD        ondansetron (ZOFRAN) injection 4 mg  4 mg Intravenous Q6H PRN Deanne Bowles MD        [Held by provider] pravastatin (PRAVACHOL) tablet 40 mg  40 mg Oral Daily Kerley, Brian Joseph, DO   40 mg at 04/08/25 1039    sodium chloride 0.9 % flush 10 mL  10 mL Intravenous Q12H Deanne Bowles MD   10 mL at 04/13/25 2127    sodium chloride 0.9 % flush 10 mL  10 mL Intravenous PRN Deanne Bowles MD   10 mL at 04/13/25 2126    sodium chloride 0.9 % infusion 40 mL  40 mL Intravenous PRN Deanne Bowles MD           amiodarone, 200 mg, Oral, Q24H  apixaban, 2.5 mg, Oral, Q12H  insulin lispro, 2-9 Units, Subcutaneous, 4x Daily AC & at Bedtime  metoprolol succinate XL, 25 mg, Oral, Daily  midodrine, 5 mg, Oral, TID AC  [Held by provider] pravastatin, 40 mg, Oral, Daily  sodium chloride, 10 mL, Intravenous, Q12H        IV Meds:          Results Reviewed:   I have personally reviewed the results from the time of this admission to 4/14/2025 05:59 EDT     Results from last 7 days   Lab Units  "04/13/25  0622 04/12/25  0620 04/11/25  0414 04/10/25  0522 04/09/25  0506   SODIUM mmol/L 138 138 135* 135* 136   POTASSIUM mmol/L 4.0 4.2 4.0 3.8 4.5   CHLORIDE mmol/L 103 104 100 99 99   CO2 mmol/L 21.9* 19.2* 20.2* 19.2* 20.0*   BUN mg/dL 80* 81* 88* 91* 92*   CREATININE mg/dL 2.40* 2.40* 2.61* 2.67* 2.64*   CALCIUM mg/dL 7.7* 7.6* 7.2* 7.2* 7.5*   BILIRUBIN mg/dL  --   --  0.9 0.7 1.0   ALK PHOS U/L  --   --  148* 136* 151*   ALT (SGPT) U/L  --   --  47* 56* 72*   AST (SGOT) U/L  --   --  36 44* 67*   GLUCOSE mg/dL 110* 83 109* 150* 120*       Estimated Creatinine Clearance: 22.9 mL/min (A) (by C-G formula based on SCr of 2.4 mg/dL (H)).    Results from last 7 days   Lab Units 04/07/25  1753   MAGNESIUM mg/dL 2.8*   PHOSPHORUS mg/dL 6.4*             Results from last 7 days   Lab Units 04/13/25  0622 04/12/25  0620 04/11/25 0414 04/10/25  0522 04/09/25  0506   WBC 10*3/mm3 5.13 4.85 5.95 5.30 8.65   HEMOGLOBIN g/dL 11.5* 11.5* 11.7* 11.1* 12.1*   PLATELETS 10*3/mm3 71* 87* 97* 99* 152             Brief Urine Lab Results  (Last result in the past 365 days)        Color   Clarity   Blood   Leuk Est   Nitrite   Protein   CREAT   Urine HCG        04/08/25 1823 Yellow   Clear   Negative   Small (1+)   Negative   30 mg/dL (1+)                   No results found for: \"UTPCR\"    Imaging Results (Last 24 Hours)       ** No results found for the last 24 hours. **                Assessment / Plan     ASSESSMENT:    COVID-19    Acute kidney injury: Patient does have significant worsening of renal function from his baseline likely hemodynamically mediated as well as cardiorenal syndrome.  Continue to optimize medications.  He does not have any significant electrolyte abnormalities bicarb is stable as well.  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  Valvular heart disease: Continue " to optimize medications.  Coronary artery disease: Not a candidate for any aggressive cardiac workup, cardiac evaluation is in progress.  Congestive heart failure: Will need to optimize his volume status will adjust diuretics as needed.  Atrial fibrillation: Currently on amiodarone with some improvement in atrial fibrillation.  Type 2 diabetes: Continue with the sliding scale  Peripheral vascular disease: Continue to optimize medications no intervention planned.  Obstructive sleep apnea: Stable use of BiPAP.      PLAN:  No new labs today.  Volume status looks good.  Blood pressure is holding fair, I will hold off giving any diuretics today, if patient goes home we will leave him on Lasix 40 mg 1-2 times a week.  I will hold off giving any diuretics today..  Details were discussed with the patient no family in the room.    Details were also discussed with the hospitalist service and or other providers as needed.  Palliative care team will have discussion with the patient and family.  No decisions have been made yet.  Continue with rest of the current treatment plan, and monitor with surveillance labs, adjust medication doses to the eGFR.  Further recommendations will depend on clinical course of the patient during the current hospitalization.   I have reviewed the copied text to this note, it was edited and the changes made as needed.  It is accurate to the point, when the note was signed today.     Thank you for involving us in the care of Blane Dietz.  Please feel free to call with any questions.    Andriy Foote MD, LOAN  04/14/25  05:59 EDT    [facility]  [phone]      Part of this note may be an electronic transcription/translation of spoken language to printed text using the Dragon Dictation System.                   Electronically signed by Andriy Foote MD, LOAN at 04/14/25 1701          Physical Therapy Notes (most recent note)        Mackenzie Whitfield, PTA  "at 04/13/25 1551  Version 1 of 1         Pt declined PT stating \"it's a bad day for me\" PT to f/u at later date      Electronically signed by Mackenzie Whitfield PTA at 04/13/25 1657          Occupational Therapy Notes (most recent note)        Olimpia Phillips OT at 04/11/25 1309          Goal Outcome Evaluation:  Plan of Care Reviewed With: patient        Progress: no change  Outcome Evaluation: Pt participated in initial OT evaluation this date. Pt required min A and cues to sequence transition to edge of bed to perform standing urination. Pt required min A to stand from edge of bed with cues for hand placement with RW. Pt required CGA in standing to urinate into urinal, pt able to hold urinal but required min A for balance without UE support on RW. Pt able to maintain static stance with unilateral support on RW. Pt reporting need for BM and required mod A and RW to take side steps to BSC. Pt with posterior loss of balance and decreased safety awareness with RW during transfer. Pt reporting a fear of falling due to weakness. Pt required supervision and significantly increased time on BSC to have a BM. Pt on RA for portion of toileting and sats >95% throughout. Pt required min A to stand from BSC and max A to complete posterior hygiene due to impaired activity tolerance and dynamic balance. Pt challenged to walk several steps to bedside recliner with min A and RW required for safety. Pt unable to walk in hallway due to isolation precautions.  Pt noted to have impaired ability to sequence self-care tasks and attention to task. Pt tolerated session with fair (-) energy for task becoming fatigued with basic ADL routine. Pt educated on discharge recommendations, mobility expectations, and role of OT while inpatient. Continue OT POC.    Anticipated Discharge Disposition (OT): sub acute care setting                          Electronically signed by Olimpia Phillips OT at 04/11/25 1311       "

## 2025-04-14 NOTE — CASE MANAGEMENT/SOCIAL WORK
Case Management/Social Work    Patient Name:  Blane Dietz  YOB: 1935  MRN: [id number]  Admit Date:  4/7/2025        13:25 EDT   Discharge Plan       Row Name 04/14/25 1323       Plan    Plan Daughter request ltc with hospice, would like us to recheck availability at [facility], referral to [facility] and will call with other facility choices.Would like private room.                        Electronically signed by:  Gina Molina RN  04/14/25 13:25 EDT

## 2025-04-14 NOTE — PROGRESS NOTES
[facility]    Name:  Blane Dietz   Age:  89 y.o.  Sex:  male  :  1935  MRN:  [id number]   Visit Number:  [id number]  Admission Date:  2025  Date Of Service:  25  Primary Care Physician:  David Em MD     LOS: 7 days :    Chief Complaint:      Shortness of air, N/V/D     Subjective:    Patient was seen examined bedside today.  Patient sitting up comfortably in the bed.  DAISY Hess with palliative care at bedside.  Patient reports overall improvement.  Denies any shortness of breath today.  Denies any pain or acute complaints otherwise. Blood pressure stable on midodrine.  Patient appears less anxious today, continues to appear generally weak.  Vitals are stable, on 2 L nasal cannula.    Hospital Course:    Blane Dietz is an 89-year-old man with past medical history of heart failure reduced ejection fraction, moderate AR, moderate MR, type 2 diabetes, cirrhosis, hypertension, prostate cancer, aortic stenosis status post AVR, HUSSAIN on CPAP, hyperlipidemia, history of diffuse large B-cell lymphoma of kidney, CKD, atrial fibrillation.  Presented to [facility] ED on 2025 with concern for several weeks of various degrees of diarrhea, shortness of air with cough.  Family reports the patient actually has been off of this medication for several weeks.  Denied any chest pain, abdominal pain.  Says that he does feel somewhat short of air.  Denied any fevers or chills.     ED summary: Afebrile, atrial fibrillation with RVR rate as high as 160 resolved with cardioversion, hypotension resolved with fluids and Levophed, vital signs stable on 2 L baseline.  EKG initially atrial fibrillation with RVR rate 161.  After resolution sinus rhythm with first-degree AV block, right bundle branch block.  Troponin monitoring 1, 41, ACS not suspected.  proBNP over 18,000.  Sodium 131, anion gap 13.5, BUN 95, creatinine 2.83, EGFR 20, blood glucose 246, calcium 8.0, magnesium  2.8, Phos 6.4, , ALT 77, bilirubin 1.3.  Lactate 2.7, procalcitonin 0.25, no leukocytosis, hemoglobin 12.2.  Blood cultures.  COVID-19 positive.  CT angio chest bilateral pleural effusions, no acute PE, 4.6 cm diameter dilated ascending aorta.  CT ab/pelvis findings suggesting decompensated cirrhotic portal hypertension, edematous gallbladder likely secondary to third spacing, thick-walled descending colon and distal rectum indeterminant.  He was provided IV amiodarone, vancomycin and cefepime, etomidate, Xylocaine, 2 L bolus.  Started on Levophed.  ED physician plans on placing central line.    Review of Systems:     All systems were reviewed and negative except as mentioned in subjective, assessment and plan.    Vital Signs:    Temp:  [97.2 °F (36.2 °C)-98.5 °F (36.9 °C)] 97.8 °F (36.6 °C)  Heart Rate:  [69-70] 70  Resp:  [16-18] 18  BP: ()/(63-87) 111/64    Intake and output:    I/O last 3 completed shifts:  In: 820 [P.O.:720; IV Piggyback:100]  Out: -   I/O this shift:  In: 480 [P.O.:480]  Out: 100 [Urine:100]    Physical Examination:    General Appearance:  Alert and cooperative.  Chronically ill-appearing.  No acute distress.  Generalized weakness noted.   Head:  Atraumatic and normocephalic.   Eyes: Conjunctivae and sclerae normal, no icterus. No pallor.   Throat: No oral lesions, no thrush, oral mucosa moist.   Neck: Supple, trachea midline, no thyromegaly.   Lungs:   Breath sounds heard bilaterally equally.  No wheezing.  Bibasilar rhonchi heard.  Decreased air movement bilaterally.  No Pleural rub or bronchial breathing.  Unlabored.  On supplemental oxygen.   Heart:  Normal S1 and S2, no murmur, no gallop, no rub. No JVD.   Abdomen:   Normal bowel sounds, no masses, no organomegaly. Soft, nontender, nondistended, no rebound tenderness.   Extremities: Supple, no edema, no cyanosis, no clubbing.    Skin: No bleeding.  Superficial chronic leg wound, see photo    Neurologic: Alert and oriented x  "3. No facial asymmetry. Moves all four limbs. No tremors.      Laboratory results:    Results from last 7 days   Lab Units 04/13/25  0622 04/12/25  0620 04/11/25 0414 04/10/25  0522 04/09/25  0506   SODIUM mmol/L 138 138 135* 135* 136   POTASSIUM mmol/L 4.0 4.2 4.0 3.8 4.5   CHLORIDE mmol/L 103 104 100 99 99   CO2 mmol/L 21.9* 19.2* 20.2* 19.2* 20.0*   BUN mg/dL 80* 81* 88* 91* 92*   CREATININE mg/dL 2.40* 2.40* 2.61* 2.67* 2.64*   CALCIUM mg/dL 7.7* 7.6* 7.2* 7.2* 7.5*   BILIRUBIN mg/dL  --   --  0.9 0.7 1.0   ALK PHOS U/L  --   --  148* 136* 151*   ALT (SGPT) U/L  --   --  47* 56* 72*   AST (SGOT) U/L  --   --  36 44* 67*   GLUCOSE mg/dL 110* 83 109* 150* 120*     Results from last 7 days   Lab Units 04/13/25 0622 04/12/25 0620 04/11/25 0414   WBC 10*3/mm3 5.13 4.85 5.95   HEMOGLOBIN g/dL 11.5* 11.5* 11.7*   HEMATOCRIT % 35.8* 35.5* 35.5*   PLATELETS 10*3/mm3 71* 87* 97*         Results from last 7 days   Lab Units 04/07/25 1910 04/07/25  1753   HSTROP T ng/L 41* 101*     Results from last 7 days   Lab Units 04/07/25 1916 04/07/25  1910   BLOODCX  No growth at 5 days No growth at 5 days     No results for input(s): \"PHART\", \"IIL6KBN\", \"PO2ART\", \"XGZ8NDY\", \"BASEEXCESS\" in the last 8760 hours.   I have reviewed the patient's laboratory results.    Radiology results:    No radiology results from the last 24 hrs    I have reviewed the patient's radiology reports.    Medication Review:     I have reviewed the patient's active and prn medications.     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  Transaminitis  Hyperbilirubinemia  Chronic leg wound  AAA     Chronic: Heart failure reduced ejection fraction, moderate AR, moderate MR, type 2 diabetes, cirrhosis, hypertension, prostate cancer, aortic stenosis " status post AVR, HUSSAIN on CPAP, hyperlipidemia, history of diffuse large B-cell lymphoma of kidney, CKD, atrial fibrillation    Plan:    Inpatient ICU admission 4/7/2025 with atrial fibrillation with RVR status post cardioversion in ED, hypotension likely multifactorial requiring Levophed, elevated troponins ACS not suspected likely demand ischemia, COVID-19 infection on his baseline 2 L, acute exacerbation of heart failure reduced ejection fraction, hyponatremia, 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     Atrial fibrillation with RVR, resolved  -Cardiology consultation, recommendations appreciated.  -Status post cardioversion in the ED.  - Per cardiology commendations, continuing IV amiodarone for a total of 48-72 hours. Then can be changed to oral amiodarone therapy at 200 mg once per day.   - switched to p.o. amiodarone.    Elevated troponins  -ACS not suspected, likely demand ischemia.    Acute exacerbation of heart failure reduced ejection fraction  Bilateral pleural effusions.   -Previous 2D echo on file from December 2024 with EF of 38%  -Daily weight, strict ins and outs  -Diuretics per nephrology  -Cardiology consulted, appreciate recommendations    Hypotension/shock, improved  -Shock likely multifactorial given multiple chronic severe conditions, but could be related to sepsis  -Central line placed in ED  -added midodrine   -IV pressors titrated off.  - Transferred out of ICU and central line DC'd on 4/11    Colitis, suspected  -Ct showed: Thick-walled ascending colon and distal rectum are indeterminate. Correlate with signs or symptoms of colitis.  - Initially started vancomycin and cefepime on admission out of an abundance of caution.  -Blood cultures remains negative x 5 days  -Unsure if hypotension has an element of septic shock, likely multifactorial given multiple chronic severe conditions.  -GI panel and C. Difficile both negative  -MRSA screen negative, discontinue  vancomycin, continue Zosyn  -WBC WNL, afebrile  - Finished 5 days of cefepime    COVID-19 infection  Chronic respiratory failure  -On his 2 L baseline.  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  -Avoid nephrotoxic drugs  -Diuretics per nephrology  - I personally discussed with Dr. Foote today.    Transaminitis, improved  Hyperbilirubinemia, improved  Cirrhosis  -Findings on imaging suggesting decompensated cirrhotic portal hypertension. Edematous gallbladder is likely secondary to 3rd spacing of fluid rather than cholecystitis.  -Right upper quadrant ultrasound ordered and pending  - Abnormal LFTs likely secondary to chronic cirrhosis.  - Monitor liver function  - Held statin    Chronic leg wound  -Wound care.    AAA  -Ascending aorta 4.6 cm diameter. Similar to prior.   -Recommend follow-up.     Chronic: Heart failure reduced ejection fraction, moderate AR, moderate MR, type 2 diabetes, cirrhosis, hypertension, prostate cancer, aortic stenosis status post AVR, HUSSAIN on CPAP, hyperlipidemia, history of diffuse large B-cell lymphoma of kidney, CKD, atrial fibrillation  -Subcutaneous insulin protocol.  -Continue other home medications.     -I have reviewed the copied text and it is accurate as of 4/14/2025     -Palliative care consulted for further discussions on goals of care, patient does not want any escalation of care, he is agreeable for hospice at this point but still undecided if his going to do home with hospice versus long-term care with hospice.  Palliative care following and will discuss further with the daughter.    DVT Prophylaxis: Eliquis  Code Status: DNR/DNI  Diet: Cardiac/renal  Discharge Plan: likely going for long-term care with hospice, case management working on placement.    Deanne Bowles MD  04/14/25  08:52 EDT    Dictated  utilizing Dragon dictation.

## 2025-04-14 NOTE — PROGRESS NOTES
"    [facility]     PALLIATIVE CARE FOLLOW UP NOTE    Name:  Blane Dietz   Age:  89 y.o.  Sex:  male  :  1935  MRN:  [id number]   Visit Number:  [id number]  Date Of Service:  25  Primary Care Physician:  David Em MD    Chief Complaint: Generalized weakness    Interval History:  Patient seen today during palliative care team rounds.  Record reviewed and case discussed with staff.  Blood pressure improved with midodrine, patient has been transferred out of the ICU.  Appetite has continued to be labile.  Last BM .  Unable to work with therapy services consistently secondary to his fatigue.  He appears comfortable upon assessment, utilizing his CPAP.  I reviewed GOC, patient with goals in alignment with hospice services, agreeing that he is not able to return to [facility] given his fatigue and high risk for fall.  He is agreeable with me speaking with his daughter regarding ultimate DCP.  I spoke with Susana, regarding GOC.  All are in agreement with Hospice services, likely within LTC facility.       Review of Systems   Constitutional:  Positive for activity change and fatigue.   Respiratory:  Positive for shortness of breath (with exertion).    Gastrointestinal:  Positive for constipation.   Neurological:  Positive for weakness.          Pain Assessment  Nonverbal Indicators of Pain: nonverbal indicators absent  Pain Location: rectal  Pain Description: intermittent, sharp  Vitals: /64 (BP Location: Left arm, Patient Position: Lying)   Pulse 70   Temp 97.8 °F (36.6 °C) (Oral)   Resp 18   Ht 167.6 cm (66\")   Wt 91.2 kg (201 lb 1 oz)   SpO2 94%   BMI 32.45 kg/m²     Physical Exam  Vitals and nursing note reviewed.   Constitutional:       General: He is not in acute distress.     Appearance: He is not diaphoretic.      Comments: Elderly male lying in bed, appears chronically ill    HENT:      Head: Normocephalic and atraumatic.      Mouth/Throat:      Comments: CPAP in " place  Eyes:      Conjunctiva/sclera: Conjunctivae normal.      Pupils: Pupils are equal, round, and reactive to light.   Cardiovascular:      Rate and Rhythm: Normal rate and regular rhythm.   Pulmonary:      Effort: Pulmonary effort is normal. No respiratory distress.      Breath sounds: Normal breath sounds.   Abdominal:      General: There is no distension.      Palpations: Abdomen is soft.      Tenderness: There is no abdominal tenderness.   Musculoskeletal:         General: No swelling. Normal range of motion.      Cervical back: Normal range of motion and neck supple.   Skin:     General: Skin is warm and dry.      Capillary Refill: Capillary refill takes less than 2 seconds.   Neurological:      Mental Status: He is alert and oriented to person, place, and time.   Psychiatric:         Mood and Affect: Mood normal.         Behavior: Behavior normal.          Results Reviewed:    Intake/Output Summary (Last 24 hours) at 4/14/2025 1059  Last data filed at 4/14/2025 0839  Gross per 24 hour   Intake 960 ml   Output 100 ml   Net 860 ml     Results from last 7 days   Lab Units 04/14/25  0936 04/12/25  0620 04/11/25  0414   SODIUM mmol/L 139   < > 135*   POTASSIUM mmol/L 4.4   < > 4.0   CHLORIDE mmol/L 102   < > 100   CO2 mmol/L 20.5*   < > 20.2*   BUN mg/dL 75*   < > 88*   CREATININE mg/dL 2.66*   < > 2.61*   CALCIUM mg/dL 8.0*   < > 7.2*   BILIRUBIN mg/dL  --   --  0.9   ALK PHOS U/L  --   --  148*   ALT (SGPT) U/L  --   --  47*   AST (SGOT) U/L  --   --  36   GLUCOSE mg/dL 152*   < > 109*    < > = values in this interval not displayed.     Results from last 7 days   Lab Units 04/14/25  0936   WBC 10*3/mm3 5.44   HEMOGLOBIN g/dL 11.6*   HEMATOCRIT % 35.7*   PLATELETS 10*3/mm3 69*       Medication Review:   I have reviewed the patients active and prn medications.     Palliative Care Assessment:  HFrEF (EF 36-40%) with acute exacerbation  Shock?  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  Cirrhosis  Debility    Recommendations/Plan:  - GOC discussions yield desire to proceed with hospice services upon discharge, interested in transitioning to LTC facility, CM following  - Patient has previously expressed no desire for further escalation of care, in the event he has acute decompensation, likely would desire transition to comfort measures   - Patient likely qualifies for hospice under general criteria given his advanced age, multiple comorbid conditions, and progressive functional decline, and poor nutrition  - Life limiting condition  - Treatment goals are for comfort rather than cure  - Documented terminal disease   - Decline in nutritional status   - Clinical progression of disease  - Repeated inpatient admission/ED evaluation  - Functional status decline  - KPS/PPS <70  - Difficult to prognosticate given current interventions in place, however he is high risk for acute decompensation and clinical decline including death, overall prognosis is quite poor  - Palliative care will continue to follow/support patient and family        CODE STATUS:   Code Status and Medical Interventions: No CPR (Do Not Attempt to Resuscitate); Limited Support; No intubation (DNI)   Ordered at: 04/08/25 0054     Code Status (Patient has no pulse and is not breathing):    No CPR (Do Not Attempt to Resuscitate)     Medical Interventions (Patient has pulse or is breathing):    Limited Support     Medical Intervention Limits:    No intubation (DNI)         I spent 55 total minutes, including face to face assessment, record review, coordination of care with staff, and counseling patient and/or family  Part of this note may be an electronic transcription/translation of spoken language to printed text using the Dragon Dictation System.    Jimena Silvestre PA-C  04/14/25  10:59 EDT

## 2025-04-14 NOTE — PROGRESS NOTES
[facility] Progress Note  [facility]. [place]        Patient Name: Blane Dietz  : 1935  MRN: [id number]   LOS: 7 days    Patient Care Team:  David Em MD as PCP - General (Internal Medicine)  Ha Toussaint MD as Consulting Physician (Cardiology)  Shannan Ramon MD as Consulting Physician (Hematology and Oncology)  Xavier Boston MD as Consulting Physician (Endocrinology)  Brionna Weinstein DO as Surgeon (General Surgery)  Sophia Ferreira PA as Physician Assistant (Endocrinology)  Abbey Reyez APRN as Nurse Practitioner (Nurse Practitioner)    Chief Complaint:    Chief Complaint   Patient presents with    Shortness of Breath     Primary Care Physician:  David Em MD  Date of admission: 2025    Subjective     Interval History:   Follow-up acute on 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   Patient is awake alert and interactive denies having any chest pain shortness of breath.  He said he feels better.    Ongoing discussion with palliative care.    He said he feels good enough and wants to go home today.  Events noted from last 24 hours.  I reviewed the chart and other providers notes, labs and procedures done since my last note.    Review of Systems:   As noted above.    Objective     Vitals:   Temp:  [97.2 °F (36.2 °C)-98.5 °F (36.9 °C)] 98.5 °F (36.9 °C)  Heart Rate:  [69-70] 70  Resp:  [16-18] 18  BP: ()/(63-87) 87/74  Flow (L/min) (Oxygen Therapy):  [2] 2    Intake/Output Summary (Last 24 hours) at 2025 0559  Last data filed at 2025 1740  Gross per 24 hour   Intake 720 ml   Output --   Net 720 ml       Physical Exam:    General Appearance: alert, oriented x 3, no acute distress   Skin: warm and dry  HEENT: oral mucosa normal, nonicteric sclera  Neck: supple, no JVD  Lungs: Decreased breath sounds with occasional basal crackles.  Heart: IRRR,   Abdomen: obese, soft, nontender, non distended and positive  bowel sounds.  : no palpable bladder  Extremities: Trace edema, no cyanosis or clubbing  Neuro: normal speech and mental status     Scheduled Meds:     Current Facility-Administered Medications   Medication Dose Route Frequency Provider Last Rate Last Admin    amiodarone (PACERONE) tablet 200 mg  200 mg Oral Q24H Deanne Bowles MD   200 mg at 04/13/25 0917    apixaban (ELIQUIS) tablet 2.5 mg  2.5 mg Oral Q12H Deanne Bowles MD   2.5 mg at 04/13/25 2126    benzocaine (AMERICAINE) 20 % rectal ointment   Rectal BID PRN Deanne Bowles MD   Given at 04/10/25 1607    dextrose (D50W) (25 g/50 mL) IV injection 25 g  25 g Intravenous Q15 Min PRN Deanne oBwles MD        dextrose (GLUTOSE) oral gel 15 g  15 g Oral Q15 Min PRN Deanne Bowles MD        glucagon (GLUCAGEN) injection 1 mg  1 mg Intramuscular Q15 Min PRN Deanne Bowles MD        hydrOXYzine pamoate (VISTARIL) capsule 50 mg  50 mg Oral TID PRN Deanne Bowles MD   50 mg at 04/14/25 0115    Insulin Lispro (humaLOG) injection 2-9 Units  2-9 Units Subcutaneous 4x Daily AC & at Bedtime Deanne Bowles MD   6 Units at 04/13/25 2127    metoprolol succinate XL (TOPROL-XL) 24 hr tablet 25 mg  25 mg Oral Daily Deanne Bowles MD   25 mg at 04/13/25 0917    midodrine (PROAMATINE) tablet 5 mg  5 mg Oral TID AC Deanne Bowles MD   5 mg at 04/13/25 1738    nitroglycerin (NITROSTAT) SL tablet 0.4 mg  0.4 mg Sublingual Q5 Min PRN Deanne Bowles MD        ondansetron (ZOFRAN) injection 4 mg  4 mg Intravenous Q6H PRN Deanne Bowles MD        [Held by provider] pravastatin (PRAVACHOL) tablet 40 mg  40 mg Oral Daily Kerley, Brian Joseph, DO   40 mg at 04/08/25 1039    sodium chloride 0.9 % flush 10 mL  10 mL Intravenous Q12H Deanne Bowles MD   10 mL at 04/13/25 2127    sodium chloride 0.9 % flush 10 mL  10 mL Intravenous PRN Deanne Bowles MD   10 mL at 04/13/25 2126    sodium chloride 0.9 % infusion 40 mL  40 mL Intravenous PRN Deanne Bowles MD      "      amiodarone, 200 mg, Oral, Q24H  apixaban, 2.5 mg, Oral, Q12H  insulin lispro, 2-9 Units, Subcutaneous, 4x Daily AC & at Bedtime  metoprolol succinate XL, 25 mg, Oral, Daily  midodrine, 5 mg, Oral, TID AC  [Held by provider] pravastatin, 40 mg, Oral, Daily  sodium chloride, 10 mL, Intravenous, Q12H        IV Meds:          Results Reviewed:   I have personally reviewed the results from the time of this admission to 4/14/2025 05:59 EDT     Results from last 7 days   Lab Units 04/13/25  0622 04/12/25  0620 04/11/25  0414 04/10/25  0522 04/09/25  0506   SODIUM mmol/L 138 138 135* 135* 136   POTASSIUM mmol/L 4.0 4.2 4.0 3.8 4.5   CHLORIDE mmol/L 103 104 100 99 99   CO2 mmol/L 21.9* 19.2* 20.2* 19.2* 20.0*   BUN mg/dL 80* 81* 88* 91* 92*   CREATININE mg/dL 2.40* 2.40* 2.61* 2.67* 2.64*   CALCIUM mg/dL 7.7* 7.6* 7.2* 7.2* 7.5*   BILIRUBIN mg/dL  --   --  0.9 0.7 1.0   ALK PHOS U/L  --   --  148* 136* 151*   ALT (SGPT) U/L  --   --  47* 56* 72*   AST (SGOT) U/L  --   --  36 44* 67*   GLUCOSE mg/dL 110* 83 109* 150* 120*       Estimated Creatinine Clearance: 22.9 mL/min (A) (by C-G formula based on SCr of 2.4 mg/dL (H)).    Results from last 7 days   Lab Units 04/07/25  1753   MAGNESIUM mg/dL 2.8*   PHOSPHORUS mg/dL 6.4*             Results from last 7 days   Lab Units 04/13/25  0622 04/12/25  0620 04/11/25  0414 04/10/25  0522 04/09/25  0506   WBC 10*3/mm3 5.13 4.85 5.95 5.30 8.65   HEMOGLOBIN g/dL 11.5* 11.5* 11.7* 11.1* 12.1*   PLATELETS 10*3/mm3 71* 87* 97* 99* 152             Brief Urine Lab Results  (Last result in the past 365 days)        Color   Clarity   Blood   Leuk Est   Nitrite   Protein   CREAT   Urine HCG        04/08/25 1823 Yellow   Clear   Negative   Small (1+)   Negative   30 mg/dL (1+)                   No results found for: \"UTPCR\"    Imaging Results (Last 24 Hours)       ** No results found for the last 24 hours. **                Assessment / Plan     ASSESSMENT:    COVID-19    Acute kidney " injury: Patient does have significant worsening of renal function from his baseline likely hemodynamically mediated as well as cardiorenal syndrome.  Continue to optimize medications.  He does not have any significant electrolyte abnormalities bicarb is stable as well.  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  Valvular heart disease: Continue to optimize medications.  Coronary artery disease: Not a candidate for any aggressive cardiac workup, cardiac evaluation is in progress.  Congestive heart failure: Will need to optimize his volume status will adjust diuretics as needed.  Atrial fibrillation: Currently on amiodarone with some improvement in atrial fibrillation.  Type 2 diabetes: Continue with the sliding scale  Peripheral vascular disease: Continue to optimize medications no intervention planned.  Obstructive sleep apnea: Stable use of BiPAP.      PLAN:  No new labs today.  Volume status looks good.  Blood pressure is holding fair, I will hold off giving any diuretics today, if patient goes home we will leave him on Lasix 40 mg 1-2 times a week.  I will hold off giving any diuretics today..  Details were discussed with the patient no family in the room.    Details were also discussed with the hospitalist service and or other providers as needed.  Palliative care team will have discussion with the patient and family.  No decisions have been made yet.  Continue with rest of the current treatment plan, and monitor with surveillance labs, adjust medication doses to the eGFR.  Further recommendations will depend on clinical course of the patient during the current hospitalization.   I have reviewed the copied text to this note, it was edited and the changes made as needed.  It is accurate to the point, when the note was signed today.     Thank you for involving us in the  care of Blane Dietz.  Please feel free to call with any questions.    Andriy Foote MD, FASN  04/14/25  05:59 EDT    [facility]  [phone]      Part of this note may be an electronic transcription/translation of spoken language to printed text using the Dragon Dictation System.

## 2025-04-14 NOTE — CASE MANAGEMENT/SOCIAL WORK
Case Management/Social Work    Patient Name:  Blane Dietz  YOB: 1935  MRN: [id number]  Admit Date:  4/7/2025        SW sent referrals for LTC w/ hospice private pay to [facility]. sW following.       Electronically signed by:  LYNDSAY Bone  04/14/25 13:24 EDT

## 2025-04-14 NOTE — THERAPY TREATMENT NOTE
Pt declined working with therapy stating he just got back into bed.  Will follow up with pt tomorrow.

## 2025-04-14 NOTE — THERAPY TREATMENT NOTE
Pt declined PT tx this date. States he just got back to bed and wants to rest. PT to f/u tomorrow.

## 2025-04-14 NOTE — PROGRESS NOTES
Dietitian Assessment    Patient Name: Blane Dietz  YOB: 1935  MRN: [id number]  Admission date: 4/7/2025    Comment:        Clinical Nutrition Assessment      Reason for Assessment LOS   H&P  Past Medical History:   Diagnosis Date    Aortic stenosis     status post ROSS procedure, remote, with December 2015 echocardiography revealing normal aortic and pulmonary valve function, normal ejection fraction and mild to moderate MR    Arthritis     Bilateral impacted cerumen 11/23/2016    Bronchitis     CHF (congestive heart failure)     Chronic gout of right foot 06/13/2016    Impression: 03/08/2016 - stable.;     COVID-19 vaccine series completed 05/12/2021    Maderna 2nd dose 3/12/21.    Depression     Diabetes mellitus     Diverticulitis     Exogenous obesity     Gout     Heart murmur     History of vitamin D deficiency     Hypercholesteremia 08/11/2016    Hyperlipidemia     Hypertension     Impaired mobility     Kidney lesion     Malignant neoplasm of prostate     Morbid obesity 08/11/2016    Pneumonia 05/12/2021    Primary osteoarthritis involving multiple joints 06/13/2016    Impression: 03/08/2016 - stable;     Pulmonary embolism     Sleep apnea 05/12/2021    C-Pap    Uncontrolled type 2 diabetes mellitus with hyperglycemia 06/13/2016    Impression: 03/08/2016 - seeing Endo .;     Vertigo        Past Surgical History:   Procedure Laterality Date    CARDIAC VALVE REPLACEMENT  05/12/2021    Ross procedure 22 years ago    COLON SURGERY      COLONOSCOPY      COLOSTOMY  1999    COLOSTOMY REVISION      CYSTOSCOPY N/A 12/7/2021    Procedure: CYSTOSCOPY FLEXIBLE;  Surgeon: José Miguel Villafuerte Jr., MD;  Location: [facility];  Service: Urology;  Laterality: N/A;    EXPLORATORY LAPAROTOMY      With Tissue Removal    LIPOMA EXCISION      MOHS SURGERY      NEPHROURETERECTOMY Right 12/7/2021    Procedure: RIGHT NEPHROURETERECTOMY LAPAROSCOPIC WITH DAVINCI ROBOT;  Surgeon: José Miguel Villafuerte Jr., MD;   "Location: [facility] OR;  Service: Robotics - DaVinci;  Laterality: Right;    OTHER SURGICAL HISTORY      Porcine Valve    PROSTATE SURGERY      PROSTATECTOMY  2000     History of Prostatectomy Perineal Radical    SKIN CANCER EXCISION      from head and lip    TONSILLECTOMY              Current Problems   Patient Active Problem List   Diagnosis    Essential hypertension    Type 2 diabetes mellitus with hyperglycemia, with long-term current use of insulin    Prostate cancer    Aortic stenosis    Sleep apnea    S/P AVR    HLD (hyperlipidemia)    Renal mass    s/p right nepheroureterectomy and adrenalectomy     Diffuse large B-cell lymphoma of solid organ excluding spleen    PICC (peripherally inserted central catheter) flush    History of pulmonary embolism    History of malignant neoplasm of prostate    Lymphoma of kidney    Stage 3b chronic kidney disease    Pneumonia due to COVID-19 virus    Abrasion    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)    Valvular heart disease    Atrial arrhythmia    RBBB    Atrial fibrillation with rapid ventricular response    Acute HFrEF (heart failure with reduced ejection fraction)    COVID-19        Encounter Information        Trending Narrative     4/14: Pt screened for LOS. Average PO intake 66% x 6 meals. No recent wt loss. Will f/up to ensure PO intake continues to improve.      Anthropometrics        Current Height, Weight Height: 167.6 cm (66\")  Weight: 91.2 kg (201 lb 1 oz) (04/11/25 0330)   Trending Weight Hx     This admission:              PTA:     Wt Readings from Last 30 Encounters:   04/11/25 0330 91.2 kg (201 lb 1 oz)   04/10/25 0400 90.3 kg (199 lb 1.2 oz)   04/09/25 0400 87.9 kg (193 lb 12.6 oz)   04/08/25 0043 87 kg (191 lb 12.8 oz)   04/07/25 1732 90.7 kg (200 lb)   02/06/25 0600 90.8 kg (200 lb 2.8 oz)   02/04/25 0500 90.2 kg (198 lb 13.7 oz)   02/03/25 1300 90 kg (198 lb 6.6 oz)   02/03/25 0809 87.1 kg (192 lb)   01/23/25 1448 87.5 kg " (193 lb)   01/16/25 1655 87 kg (191 lb 12.8 oz)   12/22/24 0857 87 kg (191 lb 12.8 oz)   12/22/24 0410 87 kg (191 lb 12.8 oz)   12/21/24 2233 87.8 kg (193 lb 9 oz)   11/13/24 1335 90.3 kg (199 lb)   10/16/24 1427 90.3 kg (199 lb)   10/04/24 1506 83.9 kg (185 lb)   09/18/24 1414 89.9 kg (198 lb 4.8 oz)   08/14/24 1352 91.6 kg (201 lb 14.4 oz)   08/01/24 1311 89.4 kg (197 lb)   07/25/24 1518 88.9 kg (196 lb)   07/17/24 1045 91.8 kg (202 lb 4.8 oz)   06/19/24 1439 91 kg (200 lb 9.6 oz)   05/22/24 1402 90.4 kg (199 lb 3.2 oz)   04/29/24 1305 88.5 kg (195 lb 3.2 oz)   04/24/24 1333 90.7 kg (200 lb)   04/18/24 1312 90.7 kg (200 lb)   04/04/24 1352 89.4 kg (197 lb)   02/21/24 1412 88.5 kg (195 lb)   01/10/24 1355 88 kg (194 lb)   12/15/23 1430 83.9 kg (185 lb)   12/13/23 1435 83.9 kg (185 lb)   11/29/23 1336 86.2 kg (190 lb)   11/28/23 1810 86.2 kg (190 lb)   11/27/23 1923 86.2 kg (190 lb)   11/25/23 1421 86.2 kg (190 lb)   11/22/23 1906 86.2 kg (190 lb)   11/01/23 1510 86.2 kg (190 lb)   09/29/23 1305 90.2 kg (198 lb 12.8 oz)      BMI kg/m2 Body mass index is 32.45 kg/m².     Labs        Pertinent Labs     Results from last 7 days   Lab Units 04/13/25  0622 04/12/25  0620 04/11/25  0414 04/10/25  0522 04/09/25  0506   SODIUM mmol/L 138 138 135* 135* 136   POTASSIUM mmol/L 4.0 4.2 4.0 3.8 4.5   CHLORIDE mmol/L 103 104 100 99 99   CO2 mmol/L 21.9* 19.2* 20.2* 19.2* 20.0*   BUN mg/dL 80* 81* 88* 91* 92*   CREATININE mg/dL 2.40* 2.40* 2.61* 2.67* 2.64*   CALCIUM mg/dL 7.7* 7.6* 7.2* 7.2* 7.5*   BILIRUBIN mg/dL  --   --  0.9 0.7 1.0   ALK PHOS U/L  --   --  148* 136* 151*   ALT (SGPT) U/L  --   --  47* 56* 72*   AST (SGOT) U/L  --   --  36 44* 67*   GLUCOSE mg/dL 110* 83 109* 150* 120*       Results from last 7 days   Lab Units 04/13/25  0622 04/08/25  0441 04/07/25  1753   MAGNESIUM mg/dL  --   --  2.8*   PHOSPHORUS mg/dL  --   --  6.4*   HEMOGLOBIN g/dL 11.5*   < > 12.2*   HEMATOCRIT % 35.8*   < > 37.1*    < > = values in  this interval not displayed.       Lab Results   Component Value Date    HGBA1C 7.80 (H) 04/08/2025            Medications       Scheduled Medications amiodarone, 200 mg, Oral, Q24H  apixaban, 2.5 mg, Oral, Q12H  insulin lispro, 2-9 Units, Subcutaneous, 4x Daily AC & at Bedtime  metoprolol succinate XL, 25 mg, Oral, Daily  midodrine, 5 mg, Oral, TID AC  [Held by provider] pravastatin, 40 mg, Oral, Daily  sodium chloride, 10 mL, Intravenous, Q12H        Infusions       PRN Medications   benzocaine    dextrose    dextrose    glucagon (human recombinant)    hydrOXYzine pamoate    nitroglycerin    ondansetron    sodium chloride    sodium chloride     Physical Findings        Trending Physical   Appearance, NFPE    --  Edema  2+ (mild), 3+ (moderate)   Bowel Function None   Tubes Peripheral IV   Chewing/Swallowing WNL   Skin Wounds noted     Estimated/Assessed Needs       Energy Requirements    EST Needs, Method, Wt used 1547-0279 kcal     25-30 kcal/kg IBW   Protein Requirements    EST Needs, Method, Wt used 44-59 g pro    0.6-0.8 gm/kg IBW   Fluid Requirements     Estimated Needs (mL/day) 1235-5248 mL    1 mL/kcal       Current Nutrition Orders & Evaluation of Intake       Oral Nutrition     Food Allergies NKFA   Current PO Diet Diet: Cardiac, Renal; Low Sodium (2g); Low Potassium, Low Sodium (2-3g), Low Phosphorus; Fluid Consistency: Thin (IDDSI 0)   Supplement    PO Evaluation     Trending % PO Intake 4/14: 66% x 6 meals     Enteral Nutrition    Enteral Route    Order, Modulars, Flushes    Residual/Tolerance    TF Observation         Parenteral Nutrition     TPN Route    Total # Days on TPN    TPN Order, Lipid Details    MVI & Trace Element Freq    TPN Observation       Nutrition Diagnosis         Nutrition Dx Problem 1 No nutrition dx      Nutrition Dx Problem 2        Intervention Goal         Intervention Goal(s) No significant wt loss  PO intake meet >50% of estimated needs     Nutrition Intervention        RD  Action Encourage intake and Continue to monitor     Nutrition Prescription          Diet Prescription Diet: Cardiac, Renal; Low Sodium (2g); Low Potassium, Low Sodium (2-3g), Low Phosphorus; Fluid Consistency: Thin (IDDSI 0)   Supplement Prescription      Enteral Prescription        TPN Prescription      Monitor/Evaluation        Monitor Per protocol, I&O, PO intake, Pertinent labs, Weight, Skin status, GI status, Symptoms, POC/GOC     RD to f/up    Electronically signed by:  Sophia Zhou RD  04/14/25 08:17 EDT

## 2025-04-15 ENCOUNTER — SPECIALTY PHARMACY (OUTPATIENT)
Dept: ONCOLOGY | Facility: HOSPITAL | Age: 89
End: 2025-04-15
Payer: MEDICARE

## 2025-04-15 LAB
ANION GAP SERPL CALCULATED.3IONS-SCNC: 16 MMOL/L (ref 5–15)
BUN SERPL-MCNC: 81 MG/DL (ref 8–23)
BUN/CREAT SERPL: 32.1 (ref 7–25)
CALCIUM SPEC-SCNC: 8 MG/DL (ref 8.6–10.5)
CHLORIDE SERPL-SCNC: 100 MMOL/L (ref 98–107)
CO2 SERPL-SCNC: 21 MMOL/L (ref 22–29)
CREAT SERPL-MCNC: 2.52 MG/DL (ref 0.76–1.27)
EGFRCR SERPLBLD CKD-EPI 2021: 24 ML/MIN/1.73
GLUCOSE BLDC GLUCOMTR-MCNC: 114 MG/DL (ref 70–130)
GLUCOSE BLDC GLUCOMTR-MCNC: 160 MG/DL (ref 70–130)
GLUCOSE BLDC GLUCOMTR-MCNC: 182 MG/DL (ref 70–130)
GLUCOSE BLDC GLUCOMTR-MCNC: 257 MG/DL (ref 70–130)
GLUCOSE SERPL-MCNC: 91 MG/DL (ref 65–99)
POTASSIUM SERPL-SCNC: 4.7 MMOL/L (ref 3.5–5.2)
SODIUM SERPL-SCNC: 137 MMOL/L (ref 136–145)

## 2025-04-15 PROCEDURE — 99232 SBSQ HOSP IP/OBS MODERATE 35: CPT | Performed by: STUDENT IN AN ORGANIZED HEALTH CARE EDUCATION/TRAINING PROGRAM

## 2025-04-15 PROCEDURE — 99232 SBSQ HOSP IP/OBS MODERATE 35: CPT | Performed by: PHYSICIAN ASSISTANT

## 2025-04-15 PROCEDURE — 63710000001 INSULIN LISPRO (HUMAN) PER 5 UNITS: Performed by: FAMILY MEDICINE

## 2025-04-15 PROCEDURE — 82948 REAGENT STRIP/BLOOD GLUCOSE: CPT

## 2025-04-15 PROCEDURE — 80048 BASIC METABOLIC PNL TOTAL CA: CPT | Performed by: FAMILY MEDICINE

## 2025-04-15 RX ADMIN — HYDROXYZINE PAMOATE 50 MG: 25 CAPSULE ORAL at 20:59

## 2025-04-15 RX ADMIN — APIXABAN 2.5 MG: 2.5 TABLET, FILM COATED ORAL at 20:59

## 2025-04-15 RX ADMIN — Medication 10 ML: at 09:19

## 2025-04-15 RX ADMIN — METOPROLOL SUCCINATE 25 MG: 25 TABLET, EXTENDED RELEASE ORAL at 09:16

## 2025-04-15 RX ADMIN — APIXABAN 2.5 MG: 2.5 TABLET, FILM COATED ORAL at 09:15

## 2025-04-15 RX ADMIN — Medication 10 ML: at 20:58

## 2025-04-15 RX ADMIN — MIDODRINE HYDROCHLORIDE 5 MG: 5 TABLET ORAL at 06:52

## 2025-04-15 RX ADMIN — MIDODRINE HYDROCHLORIDE 5 MG: 5 TABLET ORAL at 17:31

## 2025-04-15 RX ADMIN — AMIODARONE HYDROCHLORIDE 200 MG: 200 TABLET ORAL at 09:15

## 2025-04-15 RX ADMIN — INSULIN LISPRO 2 UNITS: 100 INJECTION, SOLUTION INTRAVENOUS; SUBCUTANEOUS at 20:59

## 2025-04-15 RX ADMIN — INSULIN LISPRO 2 UNITS: 100 INJECTION, SOLUTION INTRAVENOUS; SUBCUTANEOUS at 17:31

## 2025-04-15 RX ADMIN — INSULIN LISPRO 6 UNITS: 100 INJECTION, SOLUTION INTRAVENOUS; SUBCUTANEOUS at 11:51

## 2025-04-15 NOTE — PLAN OF CARE
Goal Outcome Evaluation:   VSS. No acute events during shift. Plan of care ongoing

## 2025-04-15 NOTE — PLAN OF CARE
Goal Outcome Evaluation:      Palliative Care Team visited pt this morning.  Pt awake and alert with BiPAP on.  He reported not sleeping well last night.     Pt reported waiting to hear where he will be going at discharge for LTC.  Documentation revealed pt's appetite good , Last BM 4/14/2025    Jimena assessed pt .  Pt was planning to work on a crossword puzzle today.       PC will continue to follow

## 2025-04-15 NOTE — CASE MANAGEMENT/SOCIAL WORK
Case Management/Social Work    Patient Name:  Blane Dietz  YOB: 1935  MRN: [id number]  Admit Date:  4/7/2025        [facility] has added pt to waitlist for LTC. HARPAL asked Julio Cazares if received and was able to review. HARPAL following.     09:25 EDT daughter also wanting [facility] or [facility]. HARPAL sent referrals. HARPAL Following.       Electronically signed by:  LYNDSAY Bone  04/15/25 09:03 EDT

## 2025-04-15 NOTE — PROGRESS NOTES
[facility] Progress Note  [facility]. [place]        Patient Name: Blane Dietz  : 1935  MRN: [id number]   LOS: 8 days    Patient Care Team:  David Em MD as PCP - General (Internal Medicine)  Ha Toussaint MD as Consulting Physician (Cardiology)  Shannan Ramon MD as Consulting Physician (Hematology and Oncology)  Xavier Boston MD as Consulting Physician (Endocrinology)  Brionna Weinstein DO as Surgeon (General Surgery)  Sophia Ferreira PA as Physician Assistant (Endocrinology)  Abbey Reyez APRN as Nurse Practitioner (Nurse Practitioner)    Chief Complaint:    Chief Complaint   Patient presents with    Shortness of Breath     Primary Care Physician:  David Em MD  Date of admission: 2025    Subjective     Interval History:   Follow-up acute on 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   Patient is awake alert and interactive denies having any chest pain shortness of breath.  He said he feels better.    Ongoing discussion with palliative care.  Patient is planning to go for rehab.  Events noted from last 24 hours.  I reviewed the chart and other providers notes, labs and procedures done since my last note.    Review of Systems:   As noted above.    Objective     Vitals:   Temp:  [97.3 °F (36.3 °C)-98.5 °F (36.9 °C)] 98 °F (36.7 °C)  Heart Rate:  [56-84] 84  Resp:  [18] 18  BP: ()/(59-77) 122/77  Flow (L/min) (Oxygen Therapy):  [2-3] 3    Intake/Output Summary (Last 24 hours) at 4/15/2025 0647  Last data filed at 2025 1300  Gross per 24 hour   Intake 720 ml   Output 101 ml   Net 619 ml       Physical Exam:    General Appearance: alert, oriented x 3, no acute distress   Skin: warm and dry  HEENT: oral mucosa normal, nonicteric sclera  Neck: supple, no JVD  Lungs: Decreased breath sounds with occasional basal crackles.  Heart: IRRR,   Abdomen: obese, soft, nontender, non distended and positive bowel sounds.  :  no palpable bladder  Extremities: Trace edema, no cyanosis or clubbing  Neuro: normal speech and mental status     Scheduled Meds:     Current Facility-Administered Medications   Medication Dose Route Frequency Provider Last Rate Last Admin    amiodarone (PACERONE) tablet 200 mg  200 mg Oral Q24H Deanne Bowles MD   200 mg at 04/14/25 0910    apixaban (ELIQUIS) tablet 2.5 mg  2.5 mg Oral Q12H Deanne Bowles MD   2.5 mg at 04/14/25 0910    benzocaine (AMERICAINE) 20 % rectal ointment   Rectal BID PRN Deanne Bowles MD   Given at 04/10/25 1607    dextrose (D50W) (25 g/50 mL) IV injection 25 g  25 g Intravenous Q15 Min PRN Deanne Bowles MD        dextrose (GLUTOSE) oral gel 15 g  15 g Oral Q15 Min PRN Deanne Bowles MD        glucagon (GLUCAGEN) injection 1 mg  1 mg Intramuscular Q15 Min PRN Deanne Bowles MD        hydrOXYzine pamoate (VISTARIL) capsule 50 mg  50 mg Oral TID PRN Deanne Bowles MD   50 mg at 04/14/25 0910    Insulin Lispro (humaLOG) injection 2-9 Units  2-9 Units Subcutaneous 4x Daily AC & at Bedtime Deanne Bowles MD   2 Units at 04/14/25 1707    metoprolol succinate XL (TOPROL-XL) 24 hr tablet 25 mg  25 mg Oral Daily Deanne Bowles MD   25 mg at 04/14/25 0910    midodrine (PROAMATINE) tablet 5 mg  5 mg Oral TID AC Deanne Bowles MD   5 mg at 04/14/25 1604    nitroglycerin (NITROSTAT) SL tablet 0.4 mg  0.4 mg Sublingual Q5 Min PRN Deanne Bowles MD        ondansetron (ZOFRAN) injection 4 mg  4 mg Intravenous Q6H PRN Deanne Bowles MD        [Held by provider] pravastatin (PRAVACHOL) tablet 40 mg  40 mg Oral Daily Kerley, Brian Joseph, DO   40 mg at 04/08/25 1039    sodium chloride 0.9 % flush 10 mL  10 mL Intravenous Q12H Deanne Bowles MD   10 mL at 04/14/25 2155    sodium chloride 0.9 % flush 10 mL  10 mL Intravenous PRN Deanne Bowles MD   10 mL at 04/13/25 2126    sodium chloride 0.9 % infusion 40 mL  40 mL Intravenous PRN Deanne Bowles MD           amiodarone, 200  "mg, Oral, Q24H  apixaban, 2.5 mg, Oral, Q12H  insulin lispro, 2-9 Units, Subcutaneous, 4x Daily AC & at Bedtime  metoprolol succinate XL, 25 mg, Oral, Daily  midodrine, 5 mg, Oral, TID AC  [Held by provider] pravastatin, 40 mg, Oral, Daily  sodium chloride, 10 mL, Intravenous, Q12H        IV Meds:          Results Reviewed:   I have personally reviewed the results from the time of this admission to 4/15/2025 06:47 EDT     Results from last 7 days   Lab Units 04/14/25  0936 04/13/25  0622 04/12/25  0620 04/11/25  0414 04/10/25  0522 04/09/25  0506   SODIUM mmol/L 139 138 138 135* 135* 136   POTASSIUM mmol/L 4.4 4.0 4.2 4.0 3.8 4.5   CHLORIDE mmol/L 102 103 104 100 99 99   CO2 mmol/L 20.5* 21.9* 19.2* 20.2* 19.2* 20.0*   BUN mg/dL 75* 80* 81* 88* 91* 92*   CREATININE mg/dL 2.66* 2.40* 2.40* 2.61* 2.67* 2.64*   CALCIUM mg/dL 8.0* 7.7* 7.6* 7.2* 7.2* 7.5*   BILIRUBIN mg/dL  --   --   --  0.9 0.7 1.0   ALK PHOS U/L  --   --   --  148* 136* 151*   ALT (SGPT) U/L  --   --   --  47* 56* 72*   AST (SGOT) U/L  --   --   --  36 44* 67*   GLUCOSE mg/dL 152* 110* 83 109* 150* 120*       Estimated Creatinine Clearance: 20.7 mL/min (A) (by C-G formula based on SCr of 2.66 mg/dL (H)).                  Results from last 7 days   Lab Units 04/14/25  0936 04/13/25  0622 04/12/25  0620 04/11/25  0414 04/10/25  0522   WBC 10*3/mm3 5.44 5.13 4.85 5.95 5.30   HEMOGLOBIN g/dL 11.6* 11.5* 11.5* 11.7* 11.1*   PLATELETS 10*3/mm3 69* 71* 87* 97* 99*             Brief Urine Lab Results  (Last result in the past 365 days)        Color   Clarity   Blood   Leuk Est   Nitrite   Protein   CREAT   Urine HCG        04/08/25 1823 Yellow   Clear   Negative   Small (1+)   Negative   30 mg/dL (1+)                   No results found for: \"UTPCR\"    Imaging Results (Last 24 Hours)       ** No results found for the last 24 hours. **                Assessment / Plan     ASSESSMENT:    COVID-19    Acute kidney injury: Patient does have significant worsening of " renal function from his baseline likely hemodynamically mediated as well as cardiorenal syndrome.  Continue to optimize medications.  He does not have any significant electrolyte abnormalities bicarb is stable as well.  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  Valvular heart disease: Continue to optimize medications.  Coronary artery disease: Not a candidate for any aggressive cardiac workup, cardiac evaluation is in progress.  Congestive heart failure: Will need to optimize his volume status will adjust diuretics as needed.  Atrial fibrillation: Currently on amiodarone with some improvement in atrial fibrillation.  Type 2 diabetes: Continue with the sliding scale  Peripheral vascular disease: Continue to optimize medications no intervention planned.  Obstructive sleep apnea: Stable use of BiPAP.      PLAN:  No new labs today.  Yesterday labs did show some worsening of renal function.  Volume status looks good.  Blood pressure is holding fair, I will hold off giving any diuretics again today, if patient goes home we will leave him on Lasix 40 mg 1-2 times a week.  I will hold off giving any diuretics today..  Details were discussed with the patient no family in the room.  He has decided to go to short-term rehab.  Details were also discussed with the hospitalist service and or other providers as needed.  Palliative care team will have discussion with the patient and family.    Continue with rest of the current treatment plan, and monitor with surveillance labs, adjust medication doses to the eGFR.  Further recommendations will depend on clinical course of the patient during the current hospitalization.   I have reviewed the copied text to this note, it was edited and the changes made as needed.  It is accurate to the point, when the note was signed today.     Thank you for  involving us in the care of Blane Dietz.  Please feel free to call with any questions.    Andriy Foote MD, FASN  04/15/25  06:47 EDT    [facility]  [phone]      Part of this note may be an electronic transcription/translation of spoken language to printed text using the Dragon Dictation System.

## 2025-04-15 NOTE — CASE MANAGEMENT/SOCIAL WORK
Case Management/Social Work    Patient Name:  Blane Dietz  YOB: 1935  MRN: [id number]  Admit Date:  4/7/2025        11:07 EDT   Discharge Plan       Row Name 04/15/25 1107       Plan    Plan DCP  is LTC with Hospice                        Electronically signed by:  Nicky Castellano RN  04/15/25 11:07 EDT

## 2025-04-15 NOTE — CASE MANAGEMENT/SOCIAL WORK
Spoke with Susana, daughter, would like referrals to be sent to [facility] at [place] and [facility], and [facility] in [place]. [facility] (private room) firt choice.

## 2025-04-15 NOTE — PROGRESS NOTES
"   Specialty Pharmacy Patient Management Program  Refill Outreach     Blane \"CHEO\" was contacted today regarding refills of their medication(s).    Refill Questions      Flowsheet Row Most Recent Value   Changes to allergies? No   Changes to medications? No   New conditions or infections since last clinic visit Yes   If yes, please describe  patient is currently admitted following an AFIB episode   Unplanned office visit, urgent care, ED, or hospital admission in the last 4 weeks  Yes  [x1 ER visit w/ subsequent admission @ [facility]]   How does patient/caregiver feel medication is working? Good   Financial problems or insurance changes  No   Since the previous refill, were any specialty medication doses or scheduled injections missed or delayed?  Yes   If yes, please provide the amount patient has not had a refill of Xtandi since 1/28/2025   Why were doses missed? Patient continually says he has plenty on hand. However, this does not math   Does this patient require a clinical escalation to a pharmacist? Yes                     Follow-up: 30 day(s)     Blas Calle, Pharmacy Technician  4/15/2025  08:37 EDT    "

## 2025-04-15 NOTE — PROGRESS NOTES
"    [facility]     PALLIATIVE CARE FOLLOW UP NOTE    Name:  Blane Dietz   Age:  89 y.o.  Sex:  male  :  1935  MRN:  [id number]   Visit Number:  [id number]  Date Of Service:  04/15/25  Primary Care Physician:  David Em MD    Chief Complaint: Generalized weakness    Interval History:  Patient seen today during palliative care team rounds.  Record reviewed and case discussed with staff, MARY.  Patient alert and interactive, hasn't slept well, otherwise no acute c/o.  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  CM following regarding LTC placement.          Review of Systems   Constitutional:  Positive for activity change and fatigue.   Respiratory:  Positive for shortness of breath (with exertion).    Neurological:  Positive for weakness.          Pain Assessment  Nonverbal Indicators of Pain: nonverbal indicators absent  Pain Location: rectal  Pain Description: intermittent, sharp  Vitals: /71   Pulse 67   Temp 98 °F (36.7 °C) (Oral)   Resp 18   Ht 167.6 cm (66\")   Wt 88.2 kg (194 lb 7.1 oz)   SpO2 97%   BMI 31.38 kg/m²     Physical Exam  Vitals and nursing note reviewed.   Constitutional:       General: He is not in acute distress.     Appearance: He is not diaphoretic.      Comments: Elderly male lying in bed, appears chronically ill    HENT:      Head: Normocephalic and atraumatic.      Mouth/Throat:      Comments: CPAP in place  Eyes:      Conjunctiva/sclera: Conjunctivae normal.      Pupils: Pupils are equal, round, and reactive to light.   Cardiovascular:      Comments: Appears well perfused   Pulmonary:      Effort: Pulmonary effort is normal. No respiratory distress.   Abdominal:      General: There is no distension.      Palpations: Abdomen is soft.      Tenderness: There is no abdominal tenderness.   Musculoskeletal:         General: No swelling. Normal range of motion.      Cervical back: Normal range of motion and neck supple.   Skin:     General: " Skin is warm and dry.      Capillary Refill: Capillary refill takes less than 2 seconds.   Neurological:      Mental Status: He is alert and oriented to person, place, and time.   Psychiatric:         Mood and Affect: Mood normal.         Behavior: Behavior normal.          Results Reviewed:    Intake/Output Summary (Last 24 hours) at 4/15/2025 0809  Last data filed at 4/14/2025 1300  Gross per 24 hour   Intake 720 ml   Output 1 ml   Net 719 ml     Results from last 7 days   Lab Units 04/14/25  0936 04/12/25  0620 04/11/25  0414   SODIUM mmol/L 139   < > 135*   POTASSIUM mmol/L 4.4   < > 4.0   CHLORIDE mmol/L 102   < > 100   CO2 mmol/L 20.5*   < > 20.2*   BUN mg/dL 75*   < > 88*   CREATININE mg/dL 2.66*   < > 2.61*   CALCIUM mg/dL 8.0*   < > 7.2*   BILIRUBIN mg/dL  --   --  0.9   ALK PHOS U/L  --   --  148*   ALT (SGPT) U/L  --   --  47*   AST (SGOT) U/L  --   --  36   GLUCOSE mg/dL 152*   < > 109*    < > = values in this interval not displayed.     Results from last 7 days   Lab Units 04/14/25  0936   WBC 10*3/mm3 5.44   HEMOGLOBIN g/dL 11.6*   HEMATOCRIT % 35.7*   PLATELETS 10*3/mm3 69*       Medication Review:   I have reviewed the patients active and prn medications.     Palliative Care Assessment:  HFrEF (EF 36-40%) with acute exacerbation  Shock?  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  Cirrhosis  Debility    Recommendations/Plan:  - GOC discussions yield desire to proceed with hospice services upon discharge, interested in transitioning to LTC facility, CM following  - Patient has previously expressed no desire for further escalation of care, in the event he has acute decompensation, likely would desire transition to comfort measures   - Patient likely qualifies for hospice under general criteria given his advanced age, multiple comorbid conditions, and progressive functional decline, and poor nutrition  - Life limiting condition  - Treatment goals are for comfort  rather than cure  - Documented terminal disease   - Decline in nutritional status   - Clinical progression of disease  - Repeated inpatient admission/ED evaluation  - Functional status decline  - KPS/PPS <70  - Consider Trazodone for sleep, he defers use of melatonin  - Palliative care will continue to follow/support patient and family        CODE STATUS:   Code Status and Medical Interventions: No CPR (Do Not Attempt to Resuscitate); Limited Support; No intubation (DNI)   Ordered at: 04/08/25 0054     Code Status (Patient has no pulse and is not breathing):    No CPR (Do Not Attempt to Resuscitate)     Medical Interventions (Patient has pulse or is breathing):    Limited Support     Medical Intervention Limits:    No intubation (DNI)         I spent 35 total minutes, including face to face assessment, record review, coordination of care with staff, and counseling patient and/or family  Part of this note may be an electronic transcription/translation of spoken language to printed text using the Dragon Dictation System.    Jimena Silvestre PA-C  04/15/25  08:09 EDT

## 2025-04-15 NOTE — PROGRESS NOTES
[facility]    Name:  Blane Dietz   Age:  89 y.o.  Sex:  male  :  1935  MRN:  [id number]   Visit Number:  [id number]  Admission Date:  2025  Date Of Service:  04/15/25  Primary Care Physician:  David Em MD     LOS: 8 days :    Chief Complaint:      Shortness of air, N/V/D     Subjective:    Feels good this morning.  Breathing feels good.  Denies any specific pain.    Hospital Course:    Blane Dietz is an 89-year-old man with past medical history of heart failure reduced ejection fraction, moderate AR, moderate MR, type 2 diabetes, cirrhosis, hypertension, prostate cancer, aortic stenosis status post AVR, HUSSAIN on CPAP, hyperlipidemia, history of diffuse large B-cell lymphoma of kidney, CKD, atrial fibrillation.  Presented to [facility] ED on 2025 with concern for several weeks of various degrees of diarrhea, shortness of air with cough.  Family reports the patient actually has been off of this medication for several weeks.  Denied any chest pain, abdominal pain.  Says that he does feel somewhat short of air.  Denied any fevers or chills.     ED summary: Afebrile, atrial fibrillation with RVR rate as high as 160 resolved with cardioversion, hypotension resolved with fluids and Levophed, vital signs stable on 2 L baseline.  EKG initially atrial fibrillation with RVR rate 161.  After resolution sinus rhythm with first-degree AV block, right bundle branch block.  Troponin monitoring 1, 41, ACS not suspected.  proBNP over 18,000.  Sodium 131, anion gap 13.5, BUN 95, creatinine 2.83, EGFR 20, blood glucose 246, calcium 8.0, magnesium 2.8, Phos 6.4, , ALT 77, bilirubin 1.3.  Lactate 2.7, procalcitonin 0.25, no leukocytosis, hemoglobin 12.2.  Blood cultures.  COVID-19 positive.  CT angio chest bilateral pleural effusions, no acute PE, 4.6 cm diameter dilated ascending aorta.  CT ab/pelvis findings suggesting decompensated cirrhotic portal hypertension,  edematous gallbladder likely secondary to third spacing, thick-walled descending colon and distal rectum indeterminant.  He was provided IV amiodarone, vancomycin and cefepime, etomidate, Xylocaine, 2 L bolus.  Started on Levophed.  ED physician placed central line.    Review of Systems:     All systems were reviewed and negative except as mentioned in subjective, assessment and plan.    Vital Signs:    Temp:  [97.3 °F (36.3 °C)-98.5 °F (36.9 °C)] 97.4 °F (36.3 °C)  Heart Rate:  [56-84] 70  Resp:  [15-18] 15  BP: ()/(59-82) 133/82    Intake and output:    I/O last 3 completed shifts:  In: 720 [P.O.:720]  Out: 101 [Urine:100; Stool:1]  I/O this shift:  In: 450 [P.O.:450]  Out: -     Physical Examination:    General Appearance:  Alert and cooperative.  Chronically ill-appearing.  No acute distress.  Generalized weakness noted.   Head:  Atraumatic and normocephalic.   Eyes: Conjunctivae and sclerae normal, no icterus. No pallor.   Throat: No oral lesions, no thrush, oral mucosa moist.   Neck: Supple, trachea midline, no thyromegaly.   Lungs:   Breath sounds heard bilaterally equally.  No wheezing.  Bibasilar rhonchi heard.  Decreased air movement bilaterally.  No Pleural rub or bronchial breathing.  Unlabored.  On supplemental oxygen.   Heart:  Normal S1 and S2, no murmur, no gallop, no rub. No JVD.   Abdomen:   Normal bowel sounds, no masses, no organomegaly. Soft, nontender, nondistended, no rebound tenderness.   Extremities: Supple, no edema, no cyanosis, no clubbing.    Skin: No bleeding.  Superficial chronic leg wound, see photo    Neurologic: Alert and oriented x 3. No facial asymmetry. Moves all four limbs. No tremors.      Laboratory results:    Results from last 7 days   Lab Units 04/15/25  0638 04/14/25  0936 04/13/25  0622 04/12/25  0620 04/11/25  0414 04/10/25  0522 04/09/25  0506   SODIUM mmol/L 137 139 138   < > 135* 135* 136   POTASSIUM mmol/L 4.7 4.4 4.0   < > 4.0 3.8 4.5   CHLORIDE mmol/L 100 102  "103   < > 100 99 99   CO2 mmol/L 21.0* 20.5* 21.9*   < > 20.2* 19.2* 20.0*   BUN mg/dL 81* 75* 80*   < > 88* 91* 92*   CREATININE mg/dL 2.52* 2.66* 2.40*   < > 2.61* 2.67* 2.64*   CALCIUM mg/dL 8.0* 8.0* 7.7*   < > 7.2* 7.2* 7.5*   BILIRUBIN mg/dL  --   --   --   --  0.9 0.7 1.0   ALK PHOS U/L  --   --   --   --  148* 136* 151*   ALT (SGPT) U/L  --   --   --   --  47* 56* 72*   AST (SGOT) U/L  --   --   --   --  36 44* 67*   GLUCOSE mg/dL 91 152* 110*   < > 109* 150* 120*    < > = values in this interval not displayed.     Results from last 7 days   Lab Units 04/14/25  0936 04/13/25  0622 04/12/25  0620   WBC 10*3/mm3 5.44 5.13 4.85   HEMOGLOBIN g/dL 11.6* 11.5* 11.5*   HEMATOCRIT % 35.7* 35.8* 35.5*   PLATELETS 10*3/mm3 69* 71* 87*                     No results for input(s): \"PHART\", \"WUJ8JUL\", \"PO2ART\", \"PEO9FHL\", \"BASEEXCESS\" in the last 8760 hours.   I have reviewed the patient's laboratory results.    Radiology results:    No radiology results from the last 24 hrs    I have reviewed the patient's radiology reports.    Medication Review:     I have reviewed the patient's active and prn medications.     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  Transaminitis  Hyperbilirubinemia  Chronic leg wound  AAA     Chronic: Heart failure reduced ejection fraction, moderate AR, moderate MR, type 2 diabetes, cirrhosis, hypertension, prostate cancer, aortic stenosis status post AVR, HUSSAIN on CPAP, hyperlipidemia, history of diffuse large B-cell lymphoma of kidney, CKD, atrial fibrillation    Plan:    Inpatient ICU admission 4/7/2025 with atrial fibrillation with RVR status post cardioversion in ED, hypotension likely multifactorial requiring Levophed, elevated troponins ACS not suspected likely demand ischemia, " COVID-19 infection on his baseline 2 L, acute exacerbation of heart failure reduced ejection fraction, hyponatremia, 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    Patient and family decided for transition to LTC facility with hospice.     Atrial fibrillation with RVR, resolved  -Cardiology consultation, recommendations appreciated.  -Status post cardioversion in the ED.  - Per cardiology commendations, continuing IV amiodarone for a total of 48-72 hours. Then can be changed to oral amiodarone therapy at 200 mg once per day.   - switched to p.o. amiodarone.    Elevated troponins  -ACS not suspected, likely demand ischemia.    Acute exacerbation of heart failure reduced ejection fraction  Bilateral pleural effusions.   -Previous 2D echo on file from December 2024 with EF of 38%  -Daily weight, strict ins and outs  -Diuretics per nephrology  -Cardiology consulted, appreciate recommendations    Hypotension/shock, improved, resolved  -Shock likely multifactorial given multiple chronic severe conditions, but could be related to sepsis  -Central line placed in ED  -added midodrine   -IV pressors titrated off.  - Transferred out of ICU and central line DC'd on 4/11    Colitis, suspected, resolved  -Ct showed: Thick-walled ascending colon and distal rectum are indeterminate. Correlate with signs or symptoms of colitis.  - Initially started vancomycin and cefepime on admission out of an abundance of caution.  -Blood cultures remains negative x 5 days  -Unsure if hypotension has an element of septic shock, likely multifactorial given multiple chronic severe conditions.  -GI panel and C. Difficile both negative  -MRSA screen negative, discontinue vancomycin, continue Zosyn  -WBC WNL, afebrile  - Finished 5 days of cefepime    COVID-19 infection  Chronic respiratory failure  -On his 2 L baseline.  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  -Avoid nephrotoxic drugs  -Diuretics per nephrology  - I personally discussed with Dr. Foote today.    Transaminitis, improved  Hyperbilirubinemia, improved  Cirrhosis  -Findings on imaging suggesting decompensated cirrhotic portal hypertension. Edematous gallbladder is likely secondary to 3rd spacing of fluid rather than cholecystitis.  -Right upper quadrant ultrasound ordered and pending  - Abnormal LFTs likely secondary to chronic cirrhosis.  - Monitor liver function  - Held statin    Chronic leg wound  -Wound care.    AAA  -Ascending aorta 4.6 cm diameter. Similar to prior.   -Recommend follow-up.     Chronic: Heart failure reduced ejection fraction, moderate AR, moderate MR, type 2 diabetes, cirrhosis, hypertension, prostate cancer, aortic stenosis status post AVR, HUSSAIN on CPAP, hyperlipidemia, history of diffuse large B-cell lymphoma of kidney, CKD, atrial fibrillation  -Subcutaneous insulin protocol.  -Continue other home medications.     -I have reviewed the copied text and it is accurate as of 4/15/2025     - Patient and family decided for LTC care with hospice.    DVT Prophylaxis: Eliquis  Code Status: DNR/DNI  Diet: Cardiac/renal  Discharge Plan: Pending LTC placement with hospice.    Updated daughter Susana during course.     Brian Joseph Kerley, DO  04/15/25  12:33 EDT    Dictated utilizing Dragon dictation.

## 2025-04-15 NOTE — PLAN OF CARE
Goal Outcome Evaluation:  Plan of Care Reviewed With: patient      VSS. Medications administered per orders. FSBS monitored per orders, insulin administered per sliding scale. Discharge is pending.

## 2025-04-16 LAB
ALBUMIN SERPL-MCNC: 4 G/DL (ref 3.5–5.2)
ANION GAP SERPL CALCULATED.3IONS-SCNC: 16 MMOL/L (ref 5–15)
BUN SERPL-MCNC: 88 MG/DL (ref 8–23)
BUN/CREAT SERPL: 33.7 (ref 7–25)
CALCIUM SPEC-SCNC: 7.9 MG/DL (ref 8.6–10.5)
CHLORIDE SERPL-SCNC: 98 MMOL/L (ref 98–107)
CO2 SERPL-SCNC: 20 MMOL/L (ref 22–29)
CREAT SERPL-MCNC: 2.61 MG/DL (ref 0.76–1.27)
EGFRCR SERPLBLD CKD-EPI 2021: 23 ML/MIN/1.73
GLUCOSE BLDC GLUCOMTR-MCNC: 100 MG/DL (ref 70–130)
GLUCOSE BLDC GLUCOMTR-MCNC: 197 MG/DL (ref 70–130)
GLUCOSE BLDC GLUCOMTR-MCNC: 225 MG/DL (ref 70–130)
GLUCOSE BLDC GLUCOMTR-MCNC: 383 MG/DL (ref 70–130)
GLUCOSE SERPL-MCNC: 144 MG/DL (ref 65–99)
PHOSPHATE SERPL-MCNC: 4.5 MG/DL (ref 2.5–4.5)
POTASSIUM SERPL-SCNC: 5 MMOL/L (ref 3.5–5.2)
SODIUM SERPL-SCNC: 134 MMOL/L (ref 136–145)

## 2025-04-16 PROCEDURE — 63710000001 INSULIN LISPRO (HUMAN) PER 5 UNITS: Performed by: FAMILY MEDICINE

## 2025-04-16 PROCEDURE — 80069 RENAL FUNCTION PANEL: CPT | Performed by: INTERNAL MEDICINE

## 2025-04-16 PROCEDURE — 99232 SBSQ HOSP IP/OBS MODERATE 35: CPT | Performed by: STUDENT IN AN ORGANIZED HEALTH CARE EDUCATION/TRAINING PROGRAM

## 2025-04-16 PROCEDURE — 99231 SBSQ HOSP IP/OBS SF/LOW 25: CPT | Performed by: PHYSICIAN ASSISTANT

## 2025-04-16 PROCEDURE — 82948 REAGENT STRIP/BLOOD GLUCOSE: CPT

## 2025-04-16 RX ORDER — FUROSEMIDE 20 MG/1
20 TABLET ORAL DAILY
Status: DISCONTINUED | OUTPATIENT
Start: 2025-04-16 | End: 2025-04-18

## 2025-04-16 RX ADMIN — Medication 10 ML: at 08:44

## 2025-04-16 RX ADMIN — Medication 10 ML: at 21:03

## 2025-04-16 RX ADMIN — MIDODRINE HYDROCHLORIDE 5 MG: 5 TABLET ORAL at 12:09

## 2025-04-16 RX ADMIN — INSULIN LISPRO 8 UNITS: 100 INJECTION, SOLUTION INTRAVENOUS; SUBCUTANEOUS at 12:08

## 2025-04-16 RX ADMIN — APIXABAN 2.5 MG: 2.5 TABLET, FILM COATED ORAL at 08:43

## 2025-04-16 RX ADMIN — METOPROLOL SUCCINATE 25 MG: 25 TABLET, EXTENDED RELEASE ORAL at 08:43

## 2025-04-16 RX ADMIN — HYDROXYZINE PAMOATE 50 MG: 25 CAPSULE ORAL at 21:03

## 2025-04-16 RX ADMIN — MIDODRINE HYDROCHLORIDE 5 MG: 5 TABLET ORAL at 17:55

## 2025-04-16 RX ADMIN — AMIODARONE HYDROCHLORIDE 200 MG: 200 TABLET ORAL at 08:44

## 2025-04-16 RX ADMIN — APIXABAN 2.5 MG: 2.5 TABLET, FILM COATED ORAL at 21:03

## 2025-04-16 RX ADMIN — FUROSEMIDE 20 MG: 20 TABLET ORAL at 12:09

## 2025-04-16 RX ADMIN — MIDODRINE HYDROCHLORIDE 5 MG: 5 TABLET ORAL at 08:44

## 2025-04-16 RX ADMIN — INSULIN LISPRO 2 UNITS: 100 INJECTION, SOLUTION INTRAVENOUS; SUBCUTANEOUS at 21:03

## 2025-04-16 RX ADMIN — INSULIN LISPRO 4 UNITS: 100 INJECTION, SOLUTION INTRAVENOUS; SUBCUTANEOUS at 17:55

## 2025-04-16 NOTE — PLAN OF CARE
Goal Outcome Evaluation:  Plan of Care Reviewed With: patient      VSS, pt maintaining sats >90% on 2 L NC, and pt nontele att. Medications administered per orders. FSBS monitored per orders, insulin administered per sliding scale. Discharge is pending.

## 2025-04-16 NOTE — PLAN OF CARE
Goal Outcome Evaluation:               Medications administered as ordered. Pt wore CPAP most of the shift. Will continue plan of care.

## 2025-04-16 NOTE — PROGRESS NOTES
[facility] Progress Note  [facility]. [place]        Patient Name: Blane Dietz  : 1935  MRN: [id number]   LOS: 9 days    Patient Care Team:  David Em MD as PCP - General (Internal Medicine)  Ha Toussaint MD as Consulting Physician (Cardiology)  Shannan Ramon MD as Consulting Physician (Hematology and Oncology)  Xavier Boston MD as Consulting Physician (Endocrinology)  Brionna Weinstein DO as Surgeon (General Surgery)  Sophia Ferreira PA as Physician Assistant (Endocrinology)  Abbey Reyez APRN as Nurse Practitioner (Nurse Practitioner)    Chief Complaint:    Chief Complaint   Patient presents with    Shortness of Breath     Primary Care Physician:  David Em MD  Date of admission: 2025    Subjective     Interval History:   Follow-up acute on 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   Patient is awake alert and interactive denies having any chest pain shortness of breath.  He said he feels better.  Still feels gets short of breath very easily.   Ongoing discussion with palliative care.  Patient is planning to go for rehab.  Events noted from last 24 hours.  I reviewed the chart and other providers notes, labs and procedures done since my last note.    Review of Systems:   As noted above.    Objective     Vitals:   Temp:  [96 °F (35.6 °C)-97.5 °F (36.4 °C)] 97.2 °F (36.2 °C)  Heart Rate:  [65-74] 65  Resp:  [18] 18  BP: ()/(50-73) 124/73  Flow (L/min) (Oxygen Therapy):  [2] 2    Intake/Output Summary (Last 24 hours) at 2025 1132  Last data filed at 4/15/2025 1700  Gross per 24 hour   Intake --   Output 251 ml   Net -251 ml       Physical Exam:    General Appearance: alert, oriented x 3, no acute distress   Skin: warm and dry  HEENT: oral mucosa normal, nonicteric sclera  Neck: supple, no JVD  Lungs: Decreased breath sounds with occasional basal crackles.  Heart: IRRR,   Abdomen: obese, soft, nontender, non  distended and positive bowel sounds.  : no palpable bladder  Extremities: Trace edema, no cyanosis or clubbing  Neuro: normal speech and mental status     Scheduled Meds:     Current Facility-Administered Medications   Medication Dose Route Frequency Provider Last Rate Last Admin    amiodarone (PACERONE) tablet 200 mg  200 mg Oral Q24H Deanne Bowles MD   200 mg at 04/16/25 0844    apixaban (ELIQUIS) tablet 2.5 mg  2.5 mg Oral Q12H Deanne Bowles MD   2.5 mg at 04/16/25 0843    benzocaine (AMERICAINE) 20 % rectal ointment   Rectal BID PRN Deanne Bowles MD   Given at 04/10/25 1607    dextrose (D50W) (25 g/50 mL) IV injection 25 g  25 g Intravenous Q15 Min PRN Deanne Bowles MD        dextrose (GLUTOSE) oral gel 15 g  15 g Oral Q15 Min PRN Deanne Bowles MD        furosemide (LASIX) tablet 20 mg  20 mg Oral Daily Andriy Foote MD, FASN        glucagon (GLUCAGEN) injection 1 mg  1 mg Intramuscular Q15 Min PRN Deanne Bowles MD        hydrOXYzine pamoate (VISTARIL) capsule 50 mg  50 mg Oral TID PRN Deanne Bowles MD   50 mg at 04/15/25 2059    Insulin Lispro (humaLOG) injection 2-9 Units  2-9 Units Subcutaneous 4x Daily AC & at Bedtime Deanne Bowles MD   2 Units at 04/15/25 2059    metoprolol succinate XL (TOPROL-XL) 24 hr tablet 25 mg  25 mg Oral Daily Deanne Bowles MD   25 mg at 04/16/25 0843    midodrine (PROAMATINE) tablet 5 mg  5 mg Oral TID AC Deanne Bowles MD   5 mg at 04/16/25 0844    nitroglycerin (NITROSTAT) SL tablet 0.4 mg  0.4 mg Sublingual Q5 Min PRN Deanne Bowles MD        ondansetron (ZOFRAN) injection 4 mg  4 mg Intravenous Q6H PRN Deanne Bowles MD        [Held by provider] pravastatin (PRAVACHOL) tablet 40 mg  40 mg Oral Daily Kerley, Brian Joseph,    40 mg at 04/08/25 1039    sodium chloride 0.9 % flush 10 mL  10 mL Intravenous Q12H Deanne Bowles MD   10 mL at 04/16/25 0844    sodium chloride 0.9 % flush 10 mL  10 mL Intravenous PRN Deanne Bowles MD   10 mL at  04/13/25 2126    sodium chloride 0.9 % infusion 40 mL  40 mL Intravenous Deanne Almodovar MD           amiodarone, 200 mg, Oral, Q24H  apixaban, 2.5 mg, Oral, Q12H  furosemide, 20 mg, Oral, Daily  insulin lispro, 2-9 Units, Subcutaneous, 4x Daily AC & at Bedtime  metoprolol succinate XL, 25 mg, Oral, Daily  midodrine, 5 mg, Oral, TID AC  [Held by provider] pravastatin, 40 mg, Oral, Daily  sodium chloride, 10 mL, Intravenous, Q12H        IV Meds:          Results Reviewed:   I have personally reviewed the results from the time of this admission to 4/16/2025 11:32 EDT     Results from last 7 days   Lab Units 04/16/25  1046 04/15/25  0638 04/14/25  0936 04/12/25  0620 04/11/25  0414 04/10/25  0522   SODIUM mmol/L 134* 137 139   < > 135* 135*   POTASSIUM mmol/L 5.0 4.7 4.4   < > 4.0 3.8   CHLORIDE mmol/L 98 100 102   < > 100 99   CO2 mmol/L 20.0* 21.0* 20.5*   < > 20.2* 19.2*   BUN mg/dL 88* 81* 75*   < > 88* 91*   CREATININE mg/dL 2.61* 2.52* 2.66*   < > 2.61* 2.67*   CALCIUM mg/dL 7.9* 8.0* 8.0*   < > 7.2* 7.2*   BILIRUBIN mg/dL  --   --   --   --  0.9 0.7   ALK PHOS U/L  --   --   --   --  148* 136*   ALT (SGPT) U/L  --   --   --   --  47* 56*   AST (SGOT) U/L  --   --   --   --  36 44*   GLUCOSE mg/dL 144* 91 152*   < > 109* 150*    < > = values in this interval not displayed.       Estimated Creatinine Clearance: 20.8 mL/min (A) (by C-G formula based on SCr of 2.61 mg/dL (H)).    Results from last 7 days   Lab Units 04/16/25  1046   PHOSPHORUS mg/dL 4.5               Results from last 7 days   Lab Units 04/14/25  0936 04/13/25  0622 04/12/25  0620 04/11/25  0414 04/10/25  0522   WBC 10*3/mm3 5.44 5.13 4.85 5.95 5.30   HEMOGLOBIN g/dL 11.6* 11.5* 11.5* 11.7* 11.1*   PLATELETS 10*3/mm3 69* 71* 87* 97* 99*             Brief Urine Lab Results  (Last result in the past 365 days)        Color   Clarity   Blood   Leuk Est   Nitrite   Protein   CREAT   Urine HCG        04/08/25 1823 Yellow   Clear   Negative   Small  "(1+)   Negative   30 mg/dL (1+)                   No results found for: \"UTPCR\"    Imaging Results (Last 24 Hours)       ** No results found for the last 24 hours. **                Assessment / Plan     ASSESSMENT:    COVID-19    Acute kidney injury: Patient does have significant worsening of renal function from his baseline likely hemodynamically mediated as well as cardiorenal syndrome.  Continue to optimize medications.  He does not have any significant electrolyte abnormalities bicarb is stable as well.  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  Valvular heart disease: Continue to optimize medications.  Coronary artery disease: Not a candidate for any aggressive cardiac workup, cardiac evaluation is in progress.  Congestive heart failure: Will need to optimize his volume status will adjust diuretics as needed.  Atrial fibrillation: Currently on amiodarone with some improvement in atrial fibrillation.  Type 2 diabetes: Continue with the sliding scale  Peripheral vascular disease: Continue to optimize medications no intervention planned.  Obstructive sleep apnea: Stable use of BiPAP.      PLAN:  I will go ahead and try him on Lasix 20 mg daily and see how he will respond to it usually low-dose diuretic should keep the volume more stable potassium is also noted to be slightly on the high side.  Details were discussed with the patient no family in the room.  He has decided to go to short-term rehab.  Awaiting placement.  Details were also discussed with the hospitalist service and or other providers as needed.  Palliative care team will have discussion with the patient and family.  It appears he is willing to go with hospice.  Continue with rest of the current treatment plan, and monitor with surveillance labs, adjust medication doses to the eGFR.  Further recommendations " will depend on clinical course of the patient during the current hospitalization.   I have reviewed the copied text to this note, it was edited and the changes made as needed.  It is accurate to the point, when the note was signed today.     Thank you for involving us in the care of Blane Dietz.  Please feel free to call with any questions.    Andriy Foote MD, LOAN  04/16/25  11:32 EDT    [facility]  [phone]      Part of this note may be an electronic transcription/translation of spoken language to printed text using the Dragon Dictation System.

## 2025-04-16 NOTE — PLAN OF CARE
Goal Outcome Evaluation:      Palliative Care Team --Jimena VILLA and Marie Finch RN visited pt this am.  Pt sitting on side of bed with O2 per NC    @ 2L.   Pt working on a crossword puzzle and in good spirits  Pt did appear to have bruising around his left eye.  He attributed the bruising to wearing the CPAP. .      Documentation revealed pt did not eat much breakfast and Last BM 4/16/25.     Pt was interactive  working the crossword puzzle with Jimena.  Case Management continues to seek placement at LTC facility.  Requested they check into Personal Care beds at [facility].   Later was informed per Sharron [facility] they did not have any open PC beds and there is a waiting list.    PC will continue to follow

## 2025-04-16 NOTE — CASE MANAGEMENT/SOCIAL WORK
Updated daughter, Susana, all referrals sent have responded with no bed available. Agreeable to send to wider area,

## 2025-04-16 NOTE — CASE MANAGEMENT/SOCIAL WORK
Case Management/Social Work    Patient Name:  Blane Dietz  YOB: 1935  MRN: [id number]  Admit Date:  4/7/2025        08:29 EDT   Discharge Plan       Row Name 04/16/25 0828       Plan    Plan LTC referrals pending/ with hospice                        Electronically signed by:  Gina Molina RN  04/16/25 08:29 EDT

## 2025-04-16 NOTE — CASE MANAGEMENT/SOCIAL WORK
Case Management/Social Work    Patient Name:  Blane Dietz  YOB: 1935  MRN: [id number]  Admit Date:  4/7/2025        Pt is on LTC wait list at [facility]. [facility] still in review. HARPAL called [place] and left message for Ashlee/Admissions asking for return call back. HARPAL called and spoke with Alissa/admissions at [facility] who has no LTC beds at this time. HARPAL also spoke with TJ at [facility] who also does not have any LTC beds at this time. SW following.    09:14 EDT [facility] states no LTC male beds either at this time. SW following.      12:00 EDT [facility] has no beds at this time. HARPAL updated CM.     13:07 EDT CM spoke with family. HARPAL sent more referrals for placement to surrounding counties/facilities. HARPAL following.       Electronically signed by:  LYNDSAY Bone  04/16/25 09:02 EDT

## 2025-04-16 NOTE — PROGRESS NOTES
"    [facility]     PALLIATIVE CARE FOLLOW UP NOTE    Name:  Blane Dietz   Age:  89 y.o.  Sex:  male  :  1935  MRN:  [id number]   Visit Number:  [id number]  Date Of Service:  25  Primary Care Physician:  David Em MD    Chief Complaint: Generalized weakness    Interval History:  Patient seen today during palliative care team rounds.  Record reviewed and case discussed with staff, MARY.  Patient alert and interactive, working on a crossword puzzle.   He appears in good spirits, awaiting LTC placement with Hospice.  Appetite is stable.  Last BM .         Review of Systems   Constitutional:  Positive for activity change and fatigue.   Respiratory:  Positive for shortness of breath (with exertion).    Neurological:  Positive for weakness.          Pain Assessment  Nonverbal Indicators of Pain: nonverbal indicators absent  Pain Location: rectal  Pain Description: intermittent, sharp  Vitals: /76 (BP Location: Left arm, Patient Position: Sitting)   Pulse 65   Temp 97.2 °F (36.2 °C) (Axillary)   Resp 18   Ht 167.6 cm (66\")   Wt 88.2 kg (194 lb 7.1 oz)   SpO2 98%   BMI 31.38 kg/m²     Physical Exam  Vitals and nursing note reviewed.   Constitutional:       General: He is not in acute distress.     Appearance: He is not diaphoretic.      Comments: Elderly male lying in bed, appears chronically ill    HENT:      Head: Normocephalic and atraumatic.      Mouth/Throat:      Comments: CPAP in place  Eyes:      Conjunctiva/sclera: Conjunctivae normal.      Pupils: Pupils are equal, round, and reactive to light.   Cardiovascular:      Comments: Appears well perfused   Pulmonary:      Effort: Pulmonary effort is normal. No respiratory distress.   Abdominal:      General: There is no distension.      Palpations: Abdomen is soft.      Tenderness: There is no abdominal tenderness.   Musculoskeletal:         General: No swelling. Normal range of motion.      Cervical back: Normal " range of motion and neck supple.   Skin:     General: Skin is warm and dry.      Capillary Refill: Capillary refill takes less than 2 seconds.   Neurological:      Mental Status: He is alert and oriented to person, place, and time.   Psychiatric:         Mood and Affect: Mood normal.         Behavior: Behavior normal.          Results Reviewed:    Intake/Output Summary (Last 24 hours) at 4/16/2025 1401  Last data filed at 4/16/2025 1355  Gross per 24 hour   Intake 480 ml   Output 150 ml   Net 330 ml     Results from last 7 days   Lab Units 04/16/25  1046 04/12/25  0620 04/11/25  0414   SODIUM mmol/L 134*   < > 135*   POTASSIUM mmol/L 5.0   < > 4.0   CHLORIDE mmol/L 98   < > 100   CO2 mmol/L 20.0*   < > 20.2*   BUN mg/dL 88*   < > 88*   CREATININE mg/dL 2.61*   < > 2.61*   CALCIUM mg/dL 7.9*   < > 7.2*   BILIRUBIN mg/dL  --   --  0.9   ALK PHOS U/L  --   --  148*   ALT (SGPT) U/L  --   --  47*   AST (SGOT) U/L  --   --  36   GLUCOSE mg/dL 144*   < > 109*    < > = values in this interval not displayed.     Results from last 7 days   Lab Units 04/14/25  0936   WBC 10*3/mm3 5.44   HEMOGLOBIN g/dL 11.6*   HEMATOCRIT % 35.7*   PLATELETS 10*3/mm3 69*       Medication Review:   I have reviewed the patients active and prn medications.     Palliative Care Assessment:  HFrEF (EF 36-40%) with acute exacerbation  Shock?  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  Cirrhosis  Debility    Recommendations/Plan:  - GOC discussions yield desire to proceed with hospice services upon discharge, interested in transitioning to LTC facility, CM following  - Patient has previously expressed no desire for further escalation of care, in the event he has acute decompensation, likely would desire transition to comfort measures   - Patient likely qualifies for hospice under general criteria given his advanced age, multiple comorbid conditions, and progressive functional decline, and poor nutrition  - Life  limiting condition  - Treatment goals are for comfort rather than cure  - Documented terminal disease   - Decline in nutritional status   - Clinical progression of disease  - Repeated inpatient admission/ED evaluation  - Functional status decline  - KPS/PPS <70  - Consider Trazodone for sleep, he defers use of melatonin  - Palliative care will continue to follow/support patient and family        CODE STATUS:   Code Status and Medical Interventions: No CPR (Do Not Attempt to Resuscitate); Limited Support; No intubation (DNI)   Ordered at: 04/08/25 0054     Code Status (Patient has no pulse and is not breathing):    No CPR (Do Not Attempt to Resuscitate)     Medical Interventions (Patient has pulse or is breathing):    Limited Support     Medical Intervention Limits:    No intubation (DNI)         I spent 25 total minutes, including face to face assessment, record review, coordination of care with staff, and counseling patient and/or family  Part of this note may be an electronic transcription/translation of spoken language to printed text using the Dragon Dictation System.    Jimena Silvestre PA-C  04/16/25  14:01 EDT

## 2025-04-16 NOTE — PROGRESS NOTES
"Dietitian Follow-up    Patient Name: Blane Dietz  YOB: 1935  MRN: [id number]  Admission date: 4/7/2025    Comment:      Clinical Nutrition Follow-up   Encounter Information        Trending Narrative     4/16: Average PO intake 50% x 6 meals. Pt does have elevated glucose level. Will hold off on adding CCD restriction d/t decent PO intake, comorbidities, and plans to d/c with LTC hospice.    4/14: Pt screened for LOS. Average PO intake 66% x 6 meals. No recent wt loss. Will f/up to ensure PO intake continues to improve.      Anthropometrics        Current Height, Weight Height: 167.6 cm (66\")  Weight: 88.2 kg (194 lb 7.1 oz) (04/15/25 0600)       Trending Weight Hx     This admission:              PTA:     Wt Readings from Last 30 Encounters:   04/15/25 0600 88.2 kg (194 lb 7.1 oz)   04/11/25 0330 91.2 kg (201 lb 1 oz)   04/10/25 0400 90.3 kg (199 lb 1.2 oz)   04/09/25 0400 87.9 kg (193 lb 12.6 oz)   04/08/25 0043 87 kg (191 lb 12.8 oz)   04/07/25 1732 90.7 kg (200 lb)   02/06/25 0600 90.8 kg (200 lb 2.8 oz)   02/04/25 0500 90.2 kg (198 lb 13.7 oz)   02/03/25 1300 90 kg (198 lb 6.6 oz)   02/03/25 0809 87.1 kg (192 lb)   01/23/25 1448 87.5 kg (193 lb)   01/16/25 1655 87 kg (191 lb 12.8 oz)   12/22/24 0857 87 kg (191 lb 12.8 oz)   12/22/24 0410 87 kg (191 lb 12.8 oz)   12/21/24 2233 87.8 kg (193 lb 9 oz)   11/13/24 1335 90.3 kg (199 lb)   10/16/24 1427 90.3 kg (199 lb)   10/04/24 1506 83.9 kg (185 lb)   09/18/24 1414 89.9 kg (198 lb 4.8 oz)   08/14/24 1352 91.6 kg (201 lb 14.4 oz)   08/01/24 1311 89.4 kg (197 lb)   07/25/24 1518 88.9 kg (196 lb)   07/17/24 1045 91.8 kg (202 lb 4.8 oz)   06/19/24 1439 91 kg (200 lb 9.6 oz)   05/22/24 1402 90.4 kg (199 lb 3.2 oz)   04/29/24 1305 88.5 kg (195 lb 3.2 oz)   04/24/24 1333 90.7 kg (200 lb)   04/18/24 1312 90.7 kg (200 lb)   04/04/24 1352 89.4 kg (197 lb)   02/21/24 1412 88.5 kg (195 lb)   01/10/24 1355 88 kg (194 lb)   12/15/23 1430 83.9 kg (185 lb) "   12/13/23 1435 83.9 kg (185 lb)   11/29/23 1336 86.2 kg (190 lb)   11/28/23 1810 86.2 kg (190 lb)   11/27/23 1923 86.2 kg (190 lb)   11/25/23 1421 86.2 kg (190 lb)   11/22/23 1906 86.2 kg (190 lb)   11/01/23 1510 86.2 kg (190 lb)   09/29/23 1305 90.2 kg (198 lb 12.8 oz)      BMI kg/m2 Body mass index is 31.38 kg/m².     Labs        Pertinent Labs Results from last 7 days   Lab Units 04/16/25  1046 04/15/25  0638 04/14/25  0936 04/12/25  0620 04/11/25  0414 04/10/25  0522   SODIUM mmol/L 134* 137 139   < > 135* 135*   POTASSIUM mmol/L 5.0 4.7 4.4   < > 4.0 3.8   CHLORIDE mmol/L 98 100 102   < > 100 99   CO2 mmol/L 20.0* 21.0* 20.5*   < > 20.2* 19.2*   BUN mg/dL 88* 81* 75*   < > 88* 91*   CREATININE mg/dL 2.61* 2.52* 2.66*   < > 2.61* 2.67*   CALCIUM mg/dL 7.9* 8.0* 8.0*   < > 7.2* 7.2*   BILIRUBIN mg/dL  --   --   --   --  0.9 0.7   ALK PHOS U/L  --   --   --   --  148* 136*   ALT (SGPT) U/L  --   --   --   --  47* 56*   AST (SGOT) U/L  --   --   --   --  36 44*   GLUCOSE mg/dL 144* 91 152*   < > 109* 150*    < > = values in this interval not displayed.     Results from last 7 days   Lab Units 04/16/25  1046 04/14/25  0936   PHOSPHORUS mg/dL 4.5  --    HEMOGLOBIN g/dL  --  11.6*   HEMATOCRIT %  --  35.7*         Medications    Scheduled Medications amiodarone, 200 mg, Oral, Q24H  apixaban, 2.5 mg, Oral, Q12H  furosemide, 20 mg, Oral, Daily  insulin lispro, 2-9 Units, Subcutaneous, 4x Daily AC & at Bedtime  metoprolol succinate XL, 25 mg, Oral, Daily  midodrine, 5 mg, Oral, TID AC  [Held by provider] pravastatin, 40 mg, Oral, Daily  sodium chloride, 10 mL, Intravenous, Q12H        Infusions      PRN Medications   benzocaine    dextrose    dextrose    glucagon (human recombinant)    hydrOXYzine pamoate    nitroglycerin    ondansetron    sodium chloride    sodium chloride     Physical Findings        Trending Physical   Appearance, NFPE    --  Edema  1+ (trace), 2+ (mild)   Bowel Function None   Tubes Peripheral IV    Chewing/Swallowing WNL   Skin Intact and Other: wounds noted   --  Current Nutrition Orders & Evaluation of Intake       Oral Nutrition     Food Allergies NKFA   Current PO Diet Diet: Cardiac, Renal; Low Sodium (2g); Low Potassium, Low Sodium (2-3g), Low Phosphorus; Fluid Consistency: Thin (IDDSI 0)   Supplement No active supplement orders     PO Evaluation     Trending % PO Intake 4/16: 50% x 6 meals  4/14: 66% x 6 meals     Nutrition Diagnosis         Nutrition Dx Problem 1 Predicted suboptimal intake r/t clinical condition AEB average PO intake 50% x 6 meals.       Nutrition Dx Problem 2        Intervention Goal         Intervention Goal(s) No significant wt loss  PO intake meet >50% of estimated needs     Nutrition Intervention        RD Action Encourage intake     Nutrition Prescription          Diet Prescription Diet: Cardiac, Renal; Low Sodium (2g); Low Potassium, Low Sodium (2-3g), Low Phosphorus; Fluid Consistency: Thin (IDDSI 0)   Supplement Prescription No active supplement orders     Enteral Nutrition Prescription     TPN Prescription       Monitor/Evaluation        Monitor Per protocol, I&O, PO intake, Pertinent labs, Weight, Skin status, GI status, Symptoms, POC/GOC     RD to f/up    Electronically signed by:  Sophia Zhou RD  04/16/25 12:12 EDT

## 2025-04-16 NOTE — PROGRESS NOTES
[facility]    Name:  Blane Dietz   Age:  89 y.o.  Sex:  male  :  1935  MRN:  [id number]   Visit Number:  [id number]  Admission Date:  2025  Date Of Service:  25  Primary Care Physician:  David Em MD     LOS: 9 days :    Chief Complaint:      Shortness of air, N/V/D     Subjective:    Feels good this morning.  Breathing feels good.  Denies any specific pain.    Hospital Course:    Blaen Dietz is an 89-year-old man with past medical history of heart failure reduced ejection fraction, moderate AR, moderate MR, type 2 diabetes, cirrhosis, hypertension, prostate cancer, aortic stenosis status post AVR, HUSSAIN on CPAP, hyperlipidemia, history of diffuse large B-cell lymphoma of kidney, CKD, atrial fibrillation.  Presented to [facility] ED on [facility] with concern for several weeks of various degrees of diarrhea, shortness of air with cough.  Family reports the patient actually has been off of this medication for several weeks.  Denied any chest pain, abdominal pain.  Says that he does feel somewhat short of air.  Denied any fevers or chills.     ED summary: Afebrile, atrial fibrillation with RVR rate as high as 160 resolved with cardioversion, hypotension resolved with fluids and Levophed, vital signs stable on 2 L baseline.  EKG initially atrial fibrillation with RVR rate 161.  After resolution sinus rhythm with first-degree AV block, right bundle branch block.  Troponin monitoring 1, 41, ACS not suspected.  proBNP over 18,000.  Sodium 131, anion gap 13.5, BUN 95, creatinine 2.83, EGFR 20, blood glucose 246, calcium 8.0, magnesium 2.8, Phos 6.4, , ALT 77, bilirubin 1.3.  Lactate 2.7, procalcitonin 0.25, no leukocytosis, hemoglobin 12.2.  Blood cultures.  COVID-19 positive.  CT angio chest bilateral pleural effusions, no acute PE, 4.6 cm diameter dilated ascending aorta.  CT ab/pelvis findings suggesting decompensated cirrhotic portal hypertension,  edematous gallbladder likely secondary to third spacing, thick-walled descending colon and distal rectum indeterminant.  He was provided IV amiodarone, vancomycin and cefepime, etomidate, Xylocaine, 2 L bolus.  Started on Levophed.  ED physician placed central line.    Review of Systems:     All systems were reviewed and negative except as mentioned in subjective, assessment and plan.    Vital Signs:    Temp:  [96 °F (35.6 °C)-97.5 °F (36.4 °C)] 97.2 °F (36.2 °C)  Heart Rate:  [65-74] 65  Resp:  [15-18] 18  BP: ()/(50-82) 124/73    Intake and output:    I/O last 3 completed shifts:  In: 450 [P.O.:450]  Out: 251 [Urine:250; Stool:1]  No intake/output data recorded.    Physical Examination:    General Appearance:  Alert and cooperative.  Chronically ill-appearing.  No acute distress.  Generalized weakness noted.   Head:  Atraumatic and normocephalic.   Eyes: Conjunctivae and sclerae normal, no icterus. No pallor. Bruised left eye   Throat: No oral lesions, no thrush, oral mucosa moist.   Neck: Supple, trachea midline, no thyromegaly.   Lungs:   Breath sounds heard bilaterally equally.  No wheezing.  Bibasilar rhonchi heard.  Decreased air movement bilaterally.  No Pleural rub or bronchial breathing.  Unlabored.  On supplemental oxygen.   Heart:  Normal S1 and S2, no murmur, no gallop, no rub. No JVD.   Abdomen:   Normal bowel sounds, no masses, no organomegaly. Soft, nontender, nondistended, no rebound tenderness.   Extremities: Supple, no edema, no cyanosis, no clubbing.    Skin: No bleeding.  Superficial chronic leg wound, see photo    Neurologic: Alert and oriented x 3. No facial asymmetry. Moves all four limbs. No tremors.      Laboratory results:    Results from last 7 days   Lab Units 04/15/25  0638 04/14/25  0936 04/13/25  0622 04/12/25  0620 04/11/25  0414 04/10/25  0522   SODIUM mmol/L 137 139 138   < > 135* 135*   POTASSIUM mmol/L 4.7 4.4 4.0   < > 4.0 3.8   CHLORIDE mmol/L 100 102 103   < > 100 99  "  CO2 mmol/L 21.0* 20.5* 21.9*   < > 20.2* 19.2*   BUN mg/dL 81* 75* 80*   < > 88* 91*   CREATININE mg/dL 2.52* 2.66* 2.40*   < > 2.61* 2.67*   CALCIUM mg/dL 8.0* 8.0* 7.7*   < > 7.2* 7.2*   BILIRUBIN mg/dL  --   --   --   --  0.9 0.7   ALK PHOS U/L  --   --   --   --  148* 136*   ALT (SGPT) U/L  --   --   --   --  47* 56*   AST (SGOT) U/L  --   --   --   --  36 44*   GLUCOSE mg/dL 91 152* 110*   < > 109* 150*    < > = values in this interval not displayed.     Results from last 7 days   Lab Units 04/14/25  0936 04/13/25  0622 04/12/25  0620   WBC 10*3/mm3 5.44 5.13 4.85   HEMOGLOBIN g/dL 11.6* 11.5* 11.5*   HEMATOCRIT % 35.7* 35.8* 35.5*   PLATELETS 10*3/mm3 69* 71* 87*                     No results for input(s): \"PHART\", \"IML7HLT\", \"PO2ART\", \"EOR0PJY\", \"BASEEXCESS\" in the last 8760 hours.   I have reviewed the patient's laboratory results.    Radiology results:    No radiology results from the last 24 hrs    I have reviewed the patient's radiology reports.    Medication Review:     I have reviewed the patient's active and prn medications.     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  Transaminitis  Hyperbilirubinemia  Chronic leg wound  AAA     Chronic: Heart failure reduced ejection fraction, moderate AR, moderate MR, type 2 diabetes, cirrhosis, hypertension, prostate cancer, aortic stenosis status post AVR, HUSSAIN on CPAP, hyperlipidemia, history of diffuse large B-cell lymphoma of kidney, CKD, atrial fibrillation    Plan:    Inpatient ICU admission 4/7/2025 with atrial fibrillation with RVR status post cardioversion in ED, hypotension likely multifactorial requiring Levophed, elevated troponins ACS not suspected likely demand ischemia, COVID-19 infection on his baseline 2 L, acute exacerbation of heart " failure reduced ejection fraction, hyponatremia, 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    Patient and family decided for transition to LTC facility with hospice.     Atrial fibrillation with RVR, resolved  -Cardiology consultation, recommendations appreciated.  -Status post cardioversion in the ED.  - Per cardiology commendations, continuing IV amiodarone for a total of 48-72 hours. Then can be changed to oral amiodarone therapy at 200 mg once per day.   - switched to p.o. amiodarone.    Elevated troponins  -ACS not suspected, likely demand ischemia.    Acute exacerbation of heart failure reduced ejection fraction  Bilateral pleural effusions.   -Previous 2D echo on file from December 2024 with EF of 38%  -Daily weight, strict ins and outs  -Diuretics per nephrology  -Cardiology consulted, appreciate recommendations    Hypotension/shock, improved, resolved  -Shock likely multifactorial given multiple chronic severe conditions, but could be related to sepsis  -Central line placed in ED  -added midodrine   -IV pressors titrated off.  - Transferred out of ICU and central line DC'd on 4/11    Colitis, suspected, resolved  -Ct showed: Thick-walled ascending colon and distal rectum are indeterminate. Correlate with signs or symptoms of colitis.  - Initially started vancomycin and cefepime on admission out of an abundance of caution.  -Blood cultures remains negative x 5 days  -Unsure if hypotension has an element of septic shock, likely multifactorial given multiple chronic severe conditions.  -GI panel and C. Difficile both negative  -MRSA screen negative, discontinue vancomycin, continue Zosyn  -WBC WNL, afebrile  - Finished 5 days of cefepime    COVID-19 infection  Chronic respiratory failure  -On his 2 L baseline.  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  -Avoid nephrotoxic drugs  -Diuretics per nephrology  - I personally discussed with Dr. Foote today.    Transaminitis, improved  Hyperbilirubinemia, improved  Cirrhosis  -Findings on imaging suggesting decompensated cirrhotic portal hypertension. Edematous gallbladder is likely secondary to 3rd spacing of fluid rather than cholecystitis.  -Right upper quadrant ultrasound ordered and pending  - Abnormal LFTs likely secondary to chronic cirrhosis.  - Monitor liver function  - Held statin    Chronic leg wound  -Wound care.    AAA  -Ascending aorta 4.6 cm diameter. Similar to prior.   -Recommend follow-up.     Chronic: Heart failure reduced ejection fraction, moderate AR, moderate MR, type 2 diabetes, cirrhosis, hypertension, prostate cancer, aortic stenosis status post AVR, HUSSAIN on CPAP, hyperlipidemia, history of diffuse large B-cell lymphoma of kidney, CKD, atrial fibrillation  -Subcutaneous insulin protocol.  -Continue other home medications.     -I have reviewed the copied text and it is accurate as of 4/16/2025     - Patient and family decided for LTC care with hospice.    DVT Prophylaxis: Eliquis  Code Status: DNR/DNI  Diet: Cardiac/renal  Discharge Plan: Pending LTC placement with hospice.    Updated daughter Susana during course.     Brian Joseph Kerley,   04/16/25  10:23 EDT    Dictated utilizing Dragon dictation.

## 2025-04-17 LAB
ALBUMIN SERPL-MCNC: 4.2 G/DL (ref 3.5–5.2)
ANION GAP SERPL CALCULATED.3IONS-SCNC: 16.3 MMOL/L (ref 5–15)
BUN SERPL-MCNC: 94 MG/DL (ref 8–23)
BUN/CREAT SERPL: 33.6 (ref 7–25)
CALCIUM SPEC-SCNC: 8.3 MG/DL (ref 8.6–10.5)
CHLORIDE SERPL-SCNC: 99 MMOL/L (ref 98–107)
CO2 SERPL-SCNC: 21.7 MMOL/L (ref 22–29)
CREAT SERPL-MCNC: 2.8 MG/DL (ref 0.76–1.27)
EGFRCR SERPLBLD CKD-EPI 2021: 21.2 ML/MIN/1.73
GLUCOSE BLDC GLUCOMTR-MCNC: 143 MG/DL (ref 70–130)
GLUCOSE BLDC GLUCOMTR-MCNC: 183 MG/DL (ref 70–130)
GLUCOSE BLDC GLUCOMTR-MCNC: 235 MG/DL (ref 70–130)
GLUCOSE BLDC GLUCOMTR-MCNC: 283 MG/DL (ref 70–130)
GLUCOSE SERPL-MCNC: 136 MG/DL (ref 65–99)
PHOSPHATE SERPL-MCNC: 5 MG/DL (ref 2.5–4.5)
POTASSIUM SERPL-SCNC: 4.9 MMOL/L (ref 3.5–5.2)
SODIUM SERPL-SCNC: 137 MMOL/L (ref 136–145)

## 2025-04-17 PROCEDURE — 80069 RENAL FUNCTION PANEL: CPT | Performed by: INTERNAL MEDICINE

## 2025-04-17 PROCEDURE — 99231 SBSQ HOSP IP/OBS SF/LOW 25: CPT | Performed by: PHYSICIAN ASSISTANT

## 2025-04-17 PROCEDURE — 82948 REAGENT STRIP/BLOOD GLUCOSE: CPT | Performed by: FAMILY MEDICINE

## 2025-04-17 PROCEDURE — 82948 REAGENT STRIP/BLOOD GLUCOSE: CPT

## 2025-04-17 PROCEDURE — 63710000001 INSULIN LISPRO (HUMAN) PER 5 UNITS: Performed by: FAMILY MEDICINE

## 2025-04-17 PROCEDURE — 99231 SBSQ HOSP IP/OBS SF/LOW 25: CPT | Performed by: STUDENT IN AN ORGANIZED HEALTH CARE EDUCATION/TRAINING PROGRAM

## 2025-04-17 RX ADMIN — FUROSEMIDE 20 MG: 20 TABLET ORAL at 08:17

## 2025-04-17 RX ADMIN — APIXABAN 2.5 MG: 2.5 TABLET, FILM COATED ORAL at 21:45

## 2025-04-17 RX ADMIN — INSULIN LISPRO 2 UNITS: 100 INJECTION, SOLUTION INTRAVENOUS; SUBCUTANEOUS at 21:52

## 2025-04-17 RX ADMIN — MIDODRINE HYDROCHLORIDE 5 MG: 5 TABLET ORAL at 12:20

## 2025-04-17 RX ADMIN — METOPROLOL SUCCINATE 25 MG: 25 TABLET, EXTENDED RELEASE ORAL at 08:17

## 2025-04-17 RX ADMIN — MIDODRINE HYDROCHLORIDE 5 MG: 5 TABLET ORAL at 17:27

## 2025-04-17 RX ADMIN — INSULIN LISPRO 4 UNITS: 100 INJECTION, SOLUTION INTRAVENOUS; SUBCUTANEOUS at 12:20

## 2025-04-17 RX ADMIN — AMIODARONE HYDROCHLORIDE 200 MG: 200 TABLET ORAL at 08:16

## 2025-04-17 RX ADMIN — Medication 10 ML: at 08:17

## 2025-04-17 RX ADMIN — INSULIN LISPRO 6 UNITS: 100 INJECTION, SOLUTION INTRAVENOUS; SUBCUTANEOUS at 17:27

## 2025-04-17 RX ADMIN — MIDODRINE HYDROCHLORIDE 5 MG: 5 TABLET ORAL at 08:17

## 2025-04-17 RX ADMIN — APIXABAN 2.5 MG: 2.5 TABLET, FILM COATED ORAL at 08:17

## 2025-04-17 RX ADMIN — Medication 10 ML: at 21:51

## 2025-04-17 NOTE — PLAN OF CARE
Goal Outcome Evaluation:      Palliative Care Team --Jimena VILLA and Marie Finch RN visited pt this am.   Documentation revealed pt had episode of SOA during early am with O2 sats >90.  Pt was placed on CPAP at this time.      Pt on CPAP at time of visit.  He reported still waiting for a place to DC to.  He did eat 50% of breakfast according to the EMR with last BM 4/16/25    Pt was in bed at this time.  Pt was eager to have conversations about various topics including pets and provided stories of pets.  He was in good spirits and seemed to enjoy conversations.      PC will continue to follow.

## 2025-04-17 NOTE — PROGRESS NOTES
[facility] Progress Note  [facility]. [place]        Patient Name: Blane Dietz  : 1935  MRN: [id number]   LOS: 10 days    Patient Care Team:  David Em MD as PCP - General (Internal Medicine)  Ha Toussaint MD as Consulting Physician (Cardiology)  Shannan Ramon MD as Consulting Physician (Hematology and Oncology)  Xavier Boston MD as Consulting Physician (Endocrinology)  Brionna Weinstein DO as Surgeon (General Surgery)  Sophia Ferreira PA as Physician Assistant (Endocrinology)  Abbey Reyez APRN as Nurse Practitioner (Nurse Practitioner)    Chief Complaint:    Chief Complaint   Patient presents with    Shortness of Breath     Primary Care Physician:  David Em MD  Date of admission: 2025    Subjective     Interval History:   Follow-up acute on 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   Patient is awake alert and interactive denies having any chest pain shortness of breath.  He said he feels better.  Still feels gets short of breath very easily with any physical activity.  Patient has made up his mind to go to short-term rehab with hospice.  Events noted from last 24 hours.  I reviewed the chart and other providers notes, labs and procedures done since my last note.    Review of Systems:   As noted above.    Objective     Vitals:   Temp:  [97.2 °F (36.2 °C)-98 °F (36.7 °C)] 97.3 °F (36.3 °C)  Heart Rate:  [53-75] 53  Resp:  [16-18] 18  BP: (102-130)/(51-76) 102/59  Flow (L/min) (Oxygen Therapy):  [2] 2    Intake/Output Summary (Last 24 hours) at 2025 0656  Last data filed at 2025 1757  Gross per 24 hour   Intake 720 ml   Output --   Net 720 ml       Physical Exam:    General Appearance: alert, oriented x 3, no acute distress   Skin: warm and dry  HEENT: oral mucosa normal, nonicteric sclera  Neck: supple, no JVD  Lungs: Decreased breath sounds with occasional basal crackles.  Heart: IRRR,   Abdomen: obese,  soft, nontender, non distended and positive bowel sounds.  : no palpable bladder  Extremities: Trace edema, no cyanosis or clubbing  Neuro: normal speech and mental status     Scheduled Meds:     Current Facility-Administered Medications   Medication Dose Route Frequency Provider Last Rate Last Admin    amiodarone (PACERONE) tablet 200 mg  200 mg Oral Q24H Deanne Bowles MD   200 mg at 04/16/25 0844    apixaban (ELIQUIS) tablet 2.5 mg  2.5 mg Oral Q12H Deanne Bowles MD   2.5 mg at 04/16/25 2103    benzocaine (AMERICAINE) 20 % rectal ointment   Rectal BID PRN Deanne Bowles MD   Given at 04/10/25 1607    dextrose (D50W) (25 g/50 mL) IV injection 25 g  25 g Intravenous Q15 Min PRN Deanne Bowles MD        dextrose (GLUTOSE) oral gel 15 g  15 g Oral Q15 Min PRN Deanne Bowles MD        furosemide (LASIX) tablet 20 mg  20 mg Oral Daily Andriy Foote MD, FASN   20 mg at 04/16/25 1209    glucagon (GLUCAGEN) injection 1 mg  1 mg Intramuscular Q15 Min PRN Deanne Bowles MD        hydrOXYzine pamoate (VISTARIL) capsule 50 mg  50 mg Oral TID PRN Deanne Bowles MD   50 mg at 04/16/25 2103    Insulin Lispro (humaLOG) injection 2-9 Units  2-9 Units Subcutaneous 4x Daily AC & at Bedtime Deanne Bowles MD   2 Units at 04/16/25 2103    metoprolol succinate XL (TOPROL-XL) 24 hr tablet 25 mg  25 mg Oral Daily Deanne Bowles MD   25 mg at 04/16/25 0843    midodrine (PROAMATINE) tablet 5 mg  5 mg Oral TID AC Deanne Bowles MD   5 mg at 04/16/25 1755    nitroglycerin (NITROSTAT) SL tablet 0.4 mg  0.4 mg Sublingual Q5 Min PRN Deanne Bowles MD        ondansetron (ZOFRAN) injection 4 mg  4 mg Intravenous Q6H PRN Deanne Bowles MD        [Held by provider] pravastatin (PRAVACHOL) tablet 40 mg  40 mg Oral Daily Kerley, Brian Joseph, DO   40 mg at 04/08/25 1039    sodium chloride 0.9 % flush 10 mL  10 mL Intravenous Q12H Deanne Bowles MD   10 mL at 04/16/25 1708    sodium chloride 0.9 % flush 10 mL  10 mL  Intravenous PRN Deanne Bowles MD   10 mL at 04/13/25 2126    sodium chloride 0.9 % infusion 40 mL  40 mL Intravenous PRN Deanne Bowles MD           amiodarone, 200 mg, Oral, Q24H  apixaban, 2.5 mg, Oral, Q12H  furosemide, 20 mg, Oral, Daily  insulin lispro, 2-9 Units, Subcutaneous, 4x Daily AC & at Bedtime  metoprolol succinate XL, 25 mg, Oral, Daily  midodrine, 5 mg, Oral, TID AC  [Held by provider] pravastatin, 40 mg, Oral, Daily  sodium chloride, 10 mL, Intravenous, Q12H        IV Meds:          Results Reviewed:   I have personally reviewed the results from the time of this admission to 4/17/2025 06:56 EDT     Results from last 7 days   Lab Units 04/16/25  1046 04/15/25  0638 04/14/25  0936 04/12/25  0620 04/11/25  0414   SODIUM mmol/L 134* 137 139   < > 135*   POTASSIUM mmol/L 5.0 4.7 4.4   < > 4.0   CHLORIDE mmol/L 98 100 102   < > 100   CO2 mmol/L 20.0* 21.0* 20.5*   < > 20.2*   BUN mg/dL 88* 81* 75*   < > 88*   CREATININE mg/dL 2.61* 2.52* 2.66*   < > 2.61*   CALCIUM mg/dL 7.9* 8.0* 8.0*   < > 7.2*   BILIRUBIN mg/dL  --   --   --   --  0.9   ALK PHOS U/L  --   --   --   --  148*   ALT (SGPT) U/L  --   --   --   --  47*   AST (SGOT) U/L  --   --   --   --  36   GLUCOSE mg/dL 144* 91 152*   < > 109*    < > = values in this interval not displayed.       Estimated Creatinine Clearance: 20.8 mL/min (A) (by C-G formula based on SCr of 2.61 mg/dL (H)).    Results from last 7 days   Lab Units 04/16/25  1046   PHOSPHORUS mg/dL 4.5               Results from last 7 days   Lab Units 04/14/25  0936 04/13/25  0622 04/12/25  0620 04/11/25  0414   WBC 10*3/mm3 5.44 5.13 4.85 5.95   HEMOGLOBIN g/dL 11.6* 11.5* 11.5* 11.7*   PLATELETS 10*3/mm3 69* 71* 87* 97*             Brief Urine Lab Results  (Last result in the past 365 days)        Color   Clarity   Blood   Leuk Est   Nitrite   Protein   CREAT   Urine HCG        04/08/25 1823 Yellow   Clear   Negative   Small (1+)   Negative   30 mg/dL (1+)                   No  "results found for: \"UTPCR\"    Imaging Results (Last 24 Hours)       ** No results found for the last 24 hours. **                Assessment / Plan     ASSESSMENT:    COVID-19    Acute kidney injury: Patient does have significant worsening of renal function from his baseline likely hemodynamically mediated as well as cardiorenal syndrome.  Continue to optimize medications.  He does not have any significant electrolyte abnormalities bicarb is stable as well.  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  Valvular heart disease: Continue to optimize medications.  Coronary artery disease: Not a candidate for any aggressive cardiac workup, cardiac evaluation is in progress.  Congestive heart failure: Will need to optimize his volume status will adjust diuretics as needed.  Atrial fibrillation: Currently on amiodarone with some improvement in atrial fibrillation.  Type 2 diabetes: Continue with the sliding scale  Peripheral vascular disease: Continue to optimize medications no intervention planned.  Obstructive sleep apnea: Stable use of BiPAP.      PLAN:  No new labs done today, will recheck labs again in the morning.  Continue with the Lasix 20 mg daily for now.  Details were discussed with the patient no family in the room.  He has decided to go to short-term rehab.  Awaiting placement.  Details were also discussed with the hospitalist service and or other providers as needed.    Continue with rest of the current treatment plan, and monitor with surveillance labs, adjust medication doses to the eGFR.  Further recommendations will depend on clinical course of the patient during the current hospitalization.   I have reviewed the copied text to this note, it was edited and the changes made as needed.  It is accurate to the point, when the note was signed today.     Thank you for " involving us in the care of Blane Dietz.  Please feel free to call with any questions.    Andriy Foote MD, FASN  04/17/25  06:56 EDT    [facility]  [phone]      Part of this note may be an electronic transcription/translation of spoken language to printed text using the Dragon Dictation System.

## 2025-04-17 NOTE — PLAN OF CARE
Goal Outcome Evaluation:               Maintains O2 sat >90% via 2L CPAP. Medications administered as ordered. Pt often calls out for repositioning stating he is unable to catch his air even though his O2 sats >90%. Pt is very pleasant this shift. Will continue plan of care.

## 2025-04-17 NOTE — PROGRESS NOTES
Patient has a PDC of 82%.  Reviewed importance of adherence.  Discussed tools is increase adherence such as alarms, apps, calendars, etc.  Patient stated understanding. Our team will continue to follow with monthly refill calls.    Kim Alaniz PharmD, [facility]  Clinical Oncology Pharmacy Specialist  Phone: [phone]

## 2025-04-17 NOTE — CASE MANAGEMENT/SOCIAL WORK
Case Management/Social Work    Patient Name:  Blane Dietz  YOB: 1935  MRN: [id number]  Admit Date:  4/7/2025        16:18 EDT   Discharge Plan       Row Name 04/17/25 1618       Plan    Plan DCP- Pending LTC referrals.                1030: CM at bedside. Palliative in room at this time. DCP remains pending referrals.        Electronically signed by:  Jillian Reyes RN  04/17/25 16:18 EDT

## 2025-04-17 NOTE — PLAN OF CARE
Goal Outcome Evaluation:        Problem: Adult Inpatient Plan of Care  Goal: Plan of Care Review  Outcome: Progressing          Pt doing well today; up to BSC, large BM, pt to d/c home with hospice tomorrow.

## 2025-04-17 NOTE — PROGRESS NOTES
[facility]    Name:  Blane Dietz   Age:  89 y.o.  Sex:  male  :  1935  MRN:  [id number]   Visit Number:  [id number]  Admission Date:  2025  Date Of Service:  25  Primary Care Physician:  David Em MD     LOS: 10 days :    Chief Complaint:      Shortness of air, N/V/D     Subjective:    Feels good this morning.  Breathing feels good.  Denies any specific pain.    Hospital Course:    Blane Dietz is an 89-year-old man with past medical history of heart failure reduced ejection fraction, moderate AR, moderate MR, type 2 diabetes, cirrhosis, hypertension, prostate cancer, aortic stenosis status post AVR, HUSSAIN on CPAP, hyperlipidemia, history of diffuse large B-cell lymphoma of kidney, CKD, atrial fibrillation.  Presented to [facility] ED on 2025 with concern for several weeks of various degrees of diarrhea, shortness of air with cough.  Family reports the patient actually has been off of this medication for several weeks.  Denied any chest pain, abdominal pain.  Says that he does feel somewhat short of air.  Denied any fevers or chills.     ED summary: Afebrile, atrial fibrillation with RVR rate as high as 160 resolved with cardioversion, hypotension resolved with fluids and Levophed, vital signs stable on 2 L baseline.  EKG initially atrial fibrillation with RVR rate 161.  After resolution sinus rhythm with first-degree AV block, right bundle branch block.  Troponin monitoring 1, 41, ACS not suspected.  proBNP over 18,000.  Sodium 131, anion gap 13.5, BUN 95, creatinine 2.83, EGFR 20, blood glucose 246, calcium 8.0, magnesium 2.8, Phos 6.4, , ALT 77, bilirubin 1.3.  Lactate 2.7, procalcitonin 0.25, no leukocytosis, hemoglobin 12.2.  Blood cultures.  COVID-19 positive.  CT angio chest bilateral pleural effusions, no acute PE, 4.6 cm diameter dilated ascending aorta.  CT ab/pelvis findings suggesting decompensated cirrhotic portal hypertension,  edematous gallbladder likely secondary to third spacing, thick-walled descending colon and distal rectum indeterminant.  He was provided IV amiodarone, vancomycin and cefepime, etomidate, Xylocaine, 2 L bolus.  Started on Levophed.  ED physician placed central line.    Review of Systems:     All systems were reviewed and negative except as mentioned in subjective, assessment and plan.    Vital Signs:    Temp:  [97.3 °F (36.3 °C)-98 °F (36.7 °C)] 97.5 °F (36.4 °C)  Heart Rate:  [] 65  Resp:  [16-18] 18  BP: (102-131)/(51-70) 111/55    Intake and output:    I/O last 3 completed shifts:  In: 720 [P.O.:720]  Out: -   I/O this shift:  In: 240 [P.O.:240]  Out: -     Physical Examination:    General Appearance:  Alert and cooperative.  Chronically ill-appearing.  No acute distress.  Generalized weakness noted.   Head:  Atraumatic and normocephalic.   Eyes: Conjunctivae and sclerae normal, no icterus. No pallor. Bruised left eye   Throat: No oral lesions, no thrush, oral mucosa moist.   Neck: Supple, trachea midline, no thyromegaly.   Lungs:   Breath sounds heard bilaterally equally.  No wheezing.  Bibasilar rhonchi heard.  Decreased air movement bilaterally.  No Pleural rub or bronchial breathing.  Unlabored.  On supplemental oxygen.   Heart:  Normal S1 and S2, no murmur, no gallop, no rub. No JVD.   Abdomen:   Normal bowel sounds, no masses, no organomegaly. Soft, nontender, nondistended, no rebound tenderness.   Extremities: Supple, no edema, no cyanosis, no clubbing.    Skin: No bleeding.  Superficial chronic leg wound, see photo    Neurologic: Alert and oriented x 3. No facial asymmetry. Moves all four limbs. No tremors.      Laboratory results:    Results from last 7 days   Lab Units 04/17/25  0740 04/16/25  1046 04/15/25  0638 04/12/25  0620 04/11/25  0414   SODIUM mmol/L 137 134* 137   < > 135*   POTASSIUM mmol/L 4.9 5.0 4.7   < > 4.0   CHLORIDE mmol/L 99 98 100   < > 100   CO2 mmol/L 21.7* 20.0* 21.0*   < >  "20.2*   BUN mg/dL 94* 88* 81*   < > 88*   CREATININE mg/dL 2.80* 2.61* 2.52*   < > 2.61*   CALCIUM mg/dL 8.3* 7.9* 8.0*   < > 7.2*   BILIRUBIN mg/dL  --   --   --   --  0.9   ALK PHOS U/L  --   --   --   --  148*   ALT (SGPT) U/L  --   --   --   --  47*   AST (SGOT) U/L  --   --   --   --  36   GLUCOSE mg/dL 136* 144* 91   < > 109*    < > = values in this interval not displayed.     Results from last 7 days   Lab Units 04/14/25  0936 04/13/25  0622 04/12/25  0620   WBC 10*3/mm3 5.44 5.13 4.85   HEMOGLOBIN g/dL 11.6* 11.5* 11.5*   HEMATOCRIT % 35.7* 35.8* 35.5*   PLATELETS 10*3/mm3 69* 71* 87*                     No results for input(s): \"PHART\", \"PZL8UAZ\", \"PO2ART\", \"RTO2LID\", \"BASEEXCESS\" in the last 8760 hours.   I have reviewed the patient's laboratory results.    Radiology results:    No radiology results from the last 24 hrs    I have reviewed the patient's radiology reports.    Medication Review:     I have reviewed the patient's active and prn medications.     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  Transaminitis  Hyperbilirubinemia  Chronic leg wound  AAA     Chronic: Heart failure reduced ejection fraction, moderate AR, moderate MR, type 2 diabetes, cirrhosis, hypertension, prostate cancer, aortic stenosis status post AVR, HUSSAIN on CPAP, hyperlipidemia, history of diffuse large B-cell lymphoma of kidney, CKD, atrial fibrillation    Plan:    Inpatient ICU admission 4/7/2025 with atrial fibrillation with RVR status post cardioversion in ED, hypotension likely multifactorial requiring Levophed, elevated troponins ACS not suspected likely demand ischemia, COVID-19 infection on his baseline 2 L, acute exacerbation of heart failure reduced ejection fraction, hyponatremia, GERALD on CKD, transaminitis with " bilirubinemia in the setting of cirrhosis.    Patient and family decided for transition to LTC facility with hospice.     Atrial fibrillation with RVR, resolved  -Cardiology consultation, recommendations appreciated.  -Status post cardioversion in the ED.  - Per cardiology commendations, continuing IV amiodarone for a total of 48-72 hours. Then can be changed to oral amiodarone therapy at 200 mg once per day.   - switched to p.o. amiodarone.    Elevated troponins  -ACS not suspected, likely demand ischemia.    Acute exacerbation of heart failure reduced ejection fraction  Bilateral pleural effusions.   -Previous 2D echo on file from December 2024 with EF of 38%  -Daily weight, strict ins and outs  -Diuretics per nephrology  -Cardiology consulted, appreciate recommendations    Hypotension/shock, improved, resolved  -Shock likely multifactorial given multiple chronic severe conditions, but could be related to sepsis  -Central line placed in ED  -added midodrine   -IV pressors titrated off.  - Transferred out of ICU and central line DC'd on 4/11    Colitis, suspected, resolved  -Ct showed: Thick-walled ascending colon and distal rectum are indeterminate. Correlate with signs or symptoms of colitis.  - Initially started vancomycin and cefepime on admission out of an abundance of caution.  -Blood cultures remains negative x 5 days  -Unsure if hypotension has an element of septic shock, likely multifactorial given multiple chronic severe conditions.  -GI panel and C. Difficile both negative  -MRSA screen negative, discontinue vancomycin, continue Zosyn  -WBC WNL, afebrile  - Finished 5 days of cefepime    COVID-19 infection  Chronic respiratory failure  -On his 2 L baseline.  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  -Avoid nephrotoxic drugs  -Diuretics per  nephrology  - I personally discussed with Dr. Foote today.    Transaminitis, improved  Hyperbilirubinemia, improved  Cirrhosis  -Findings on imaging suggesting decompensated cirrhotic portal hypertension. Edematous gallbladder is likely secondary to 3rd spacing of fluid rather than cholecystitis.  -Right upper quadrant ultrasound ordered and pending  - Abnormal LFTs likely secondary to chronic cirrhosis.  - Monitor liver function  - Held statin    Chronic leg wound  -Wound care.    AAA  -Ascending aorta 4.6 cm diameter. Similar to prior.   -Recommend follow-up.     Chronic: Heart failure reduced ejection fraction, moderate AR, moderate MR, type 2 diabetes, cirrhosis, hypertension, prostate cancer, aortic stenosis status post AVR, HUSSAIN on CPAP, hyperlipidemia, history of diffuse large B-cell lymphoma of kidney, CKD, atrial fibrillation  -Subcutaneous insulin protocol.  -Continue other home medications.     -I have reviewed the copied text and it is accurate as of 4/17/2025     - Patient and family decided for LTC care with hospice.    DVT Prophylaxis: Eliquis  Code Status: DNR/DNI  Diet: Cardiac/renal  Discharge Plan: Pending LTC placement with hospice.    Updated daughter Susana during course.     Brian Joseph Kerley, DO  04/17/25  13:27 EDT    Dictated utilizing Dragon dictation.

## 2025-04-17 NOTE — PROGRESS NOTES
"    [facility]     PALLIATIVE CARE FOLLOW UP NOTE    Name:  Blane Dietz   Age:  89 y.o.  Sex:  male  :  1935  MRN:  [id number]   Visit Number:  [id number]  Date Of Service:  25  Primary Care Physician:  David Em MD    Chief Complaint: Generalized weakness    Interval History:  Patient seen today during palliative care team rounds.  Record reviewed and case discussed with staff, had some dyspnea overnight that improved with use of CPAP.  Upon assessment today, he is feeling well.  We discussed crossword puzzles, pets, and various other topics.  He appears in good spirits.  Currently awaiting LTC placement.         Review of Systems   Constitutional:  Positive for activity change and fatigue.   Respiratory:  Positive for shortness of breath (with exertion).    Neurological:  Positive for weakness.          Pain Assessment  Nonverbal Indicators of Pain: nonverbal indicators absent  Pain Location: rectal  Pain Description: intermittent, sharp  Vitals: /65 (BP Location: Right arm, Patient Position: Lying)   Pulse 103   Temp 97.5 °F (36.4 °C) (Axillary)   Resp 18   Ht 167.6 cm (66\")   Wt 88.2 kg (194 lb 7.1 oz)   SpO2 96%   BMI 31.38 kg/m²     Physical Exam  Vitals and nursing note reviewed.   Constitutional:       General: He is not in acute distress.     Appearance: He is not diaphoretic.      Comments: Elderly male lying in bed, appears chronically ill    HENT:      Head: Normocephalic and atraumatic.      Mouth/Throat:      Comments: CPAP in place  Eyes:      Conjunctiva/sclera: Conjunctivae normal.      Pupils: Pupils are equal, round, and reactive to light.   Cardiovascular:      Comments: Appears well perfused   Pulmonary:      Effort: Pulmonary effort is normal. No respiratory distress.   Abdominal:      General: There is no distension.      Palpations: Abdomen is soft.      Tenderness: There is no abdominal tenderness.   Musculoskeletal:         General: No " swelling. Normal range of motion.      Cervical back: Normal range of motion and neck supple.   Skin:     General: Skin is warm and dry.      Capillary Refill: Capillary refill takes less than 2 seconds.   Neurological:      Mental Status: He is alert and oriented to person, place, and time.   Psychiatric:         Mood and Affect: Mood normal.         Behavior: Behavior normal.          Results Reviewed:    Intake/Output Summary (Last 24 hours) at 4/17/2025 1116  Last data filed at 4/17/2025 0900  Gross per 24 hour   Intake 960 ml   Output --   Net 960 ml     Results from last 7 days   Lab Units 04/17/25  0740 04/12/25  0620 04/11/25  0414   SODIUM mmol/L 137   < > 135*   POTASSIUM mmol/L 4.9   < > 4.0   CHLORIDE mmol/L 99   < > 100   CO2 mmol/L 21.7*   < > 20.2*   BUN mg/dL 94*   < > 88*   CREATININE mg/dL 2.80*   < > 2.61*   CALCIUM mg/dL 8.3*   < > 7.2*   BILIRUBIN mg/dL  --   --  0.9   ALK PHOS U/L  --   --  148*   ALT (SGPT) U/L  --   --  47*   AST (SGOT) U/L  --   --  36   GLUCOSE mg/dL 136*   < > 109*    < > = values in this interval not displayed.     Results from last 7 days   Lab Units 04/14/25  0936   WBC 10*3/mm3 5.44   HEMOGLOBIN g/dL 11.6*   HEMATOCRIT % 35.7*   PLATELETS 10*3/mm3 69*       Medication Review:   I have reviewed the patients active and prn medications.     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  Cirrhosis  Debility    Recommendations/Plan:  - GOC discussions yield desire to proceed with hospice services upon discharge, interested in transitioning to LTC facility, CM following  - Patient has previously expressed no desire for further escalation of care, in the event he has acute decompensation, likely would desire transition to comfort measures   - Patient likely qualifies for hospice under general criteria given his advanced age, multiple comorbid conditions,  and progressive functional decline, and poor nutrition  - Life limiting condition  - Treatment goals are for comfort rather than cure  - Documented terminal disease   - Decline in nutritional status   - Clinical progression of disease  - Repeated inpatient admission/ED evaluation  - Functional status decline  - KPS/PPS <70  - Consider Trazodone for sleep, he defers use of melatonin  - Palliative care will continue to follow/support patient and family        CODE STATUS:   Code Status and Medical Interventions: No CPR (Do Not Attempt to Resuscitate); Limited Support; No intubation (DNI)   Ordered at: 04/08/25 0054     Code Status (Patient has no pulse and is not breathing):    No CPR (Do Not Attempt to Resuscitate)     Medical Interventions (Patient has pulse or is breathing):    Limited Support     Medical Intervention Limits:    No intubation (DNI)         I spent 25 total minutes, including face to face assessment, record review, coordination of care with staff, and counseling patient and/or family  Part of this note may be an electronic transcription/translation of spoken language to printed text using the Dragon Dictation System.    Jimena Silvestre PA-C  04/17/25  11:16 EDT

## 2025-04-17 NOTE — CASE MANAGEMENT/SOCIAL WORK
Case Management/Social Work    Patient Name:  Blane Dietz  YOB: 1935  MRN: [id number]  Admit Date:  4/7/2025        SW sent more referrals for LTC placement to surrounding counties/facilities. Pt would be hospice private pay. SW following.       Electronically signed by:  LYNDSAY Bone  04/17/25 10:07 EDT

## 2025-04-18 ENCOUNTER — TELEPHONE (OUTPATIENT)
Dept: INTERNAL MEDICINE | Facility: CLINIC | Age: 89
End: 2025-04-18
Payer: MEDICARE

## 2025-04-18 LAB
ALBUMIN SERPL-MCNC: 3.7 G/DL (ref 3.5–5.2)
ANION GAP SERPL CALCULATED.3IONS-SCNC: 15.1 MMOL/L (ref 5–15)
BUN SERPL-MCNC: 93 MG/DL (ref 8–23)
BUN/CREAT SERPL: 31.5 (ref 7–25)
CALCIUM SPEC-SCNC: 8.1 MG/DL (ref 8.6–10.5)
CHLORIDE SERPL-SCNC: 102 MMOL/L (ref 98–107)
CO2 SERPL-SCNC: 18.9 MMOL/L (ref 22–29)
CREAT SERPL-MCNC: 2.95 MG/DL (ref 0.76–1.27)
EGFRCR SERPLBLD CKD-EPI 2021: 19.9 ML/MIN/1.73
GLUCOSE BLDC GLUCOMTR-MCNC: 193 MG/DL (ref 70–130)
GLUCOSE BLDC GLUCOMTR-MCNC: 208 MG/DL (ref 70–130)
GLUCOSE BLDC GLUCOMTR-MCNC: 217 MG/DL (ref 70–130)
GLUCOSE BLDC GLUCOMTR-MCNC: 253 MG/DL (ref 70–130)
GLUCOSE SERPL-MCNC: 197 MG/DL (ref 65–99)
PHOSPHATE SERPL-MCNC: 4.6 MG/DL (ref 2.5–4.5)
POTASSIUM SERPL-SCNC: 4.6 MMOL/L (ref 3.5–5.2)
SODIUM SERPL-SCNC: 136 MMOL/L (ref 136–145)

## 2025-04-18 PROCEDURE — 80069 RENAL FUNCTION PANEL: CPT | Performed by: INTERNAL MEDICINE

## 2025-04-18 PROCEDURE — 82948 REAGENT STRIP/BLOOD GLUCOSE: CPT

## 2025-04-18 PROCEDURE — 82948 REAGENT STRIP/BLOOD GLUCOSE: CPT | Performed by: FAMILY MEDICINE

## 2025-04-18 PROCEDURE — 63710000001 INSULIN LISPRO (HUMAN) PER 5 UNITS: Performed by: FAMILY MEDICINE

## 2025-04-18 PROCEDURE — 99231 SBSQ HOSP IP/OBS SF/LOW 25: CPT | Performed by: STUDENT IN AN ORGANIZED HEALTH CARE EDUCATION/TRAINING PROGRAM

## 2025-04-18 RX ADMIN — APIXABAN 2.5 MG: 2.5 TABLET, FILM COATED ORAL at 08:46

## 2025-04-18 RX ADMIN — METOPROLOL SUCCINATE 25 MG: 25 TABLET, EXTENDED RELEASE ORAL at 08:46

## 2025-04-18 RX ADMIN — INSULIN LISPRO 2 UNITS: 100 INJECTION, SOLUTION INTRAVENOUS; SUBCUTANEOUS at 08:49

## 2025-04-18 RX ADMIN — APIXABAN 2.5 MG: 2.5 TABLET, FILM COATED ORAL at 21:34

## 2025-04-18 RX ADMIN — MIDODRINE HYDROCHLORIDE 5 MG: 5 TABLET ORAL at 08:46

## 2025-04-18 RX ADMIN — INSULIN LISPRO 4 UNITS: 100 INJECTION, SOLUTION INTRAVENOUS; SUBCUTANEOUS at 18:19

## 2025-04-18 RX ADMIN — FUROSEMIDE 20 MG: 20 TABLET ORAL at 08:46

## 2025-04-18 RX ADMIN — MIDODRINE HYDROCHLORIDE 5 MG: 5 TABLET ORAL at 18:19

## 2025-04-18 RX ADMIN — Medication 10 ML: at 08:49

## 2025-04-18 RX ADMIN — INSULIN LISPRO 6 UNITS: 100 INJECTION, SOLUTION INTRAVENOUS; SUBCUTANEOUS at 12:01

## 2025-04-18 RX ADMIN — HYDROXYZINE PAMOATE 50 MG: 25 CAPSULE ORAL at 21:33

## 2025-04-18 RX ADMIN — INSULIN LISPRO 4 UNITS: 100 INJECTION, SOLUTION INTRAVENOUS; SUBCUTANEOUS at 21:34

## 2025-04-18 RX ADMIN — AMIODARONE HYDROCHLORIDE 200 MG: 200 TABLET ORAL at 08:46

## 2025-04-18 RX ADMIN — MIDODRINE HYDROCHLORIDE 5 MG: 5 TABLET ORAL at 14:30

## 2025-04-18 NOTE — PLAN OF CARE
Problem: Adult Inpatient Plan of Care  Goal: Plan of Care Review  Outcome: Progressing  Goal: Patient-Specific Goal (Individualized)  Outcome: Progressing  Goal: Absence of Hospital-Acquired Illness or Injury  Outcome: Progressing  Intervention: Identify and Manage Fall Risk  Recent Flowsheet Documentation  Taken 4/18/2025 1800 by Natali Sutton RN  Safety Promotion/Fall Prevention: safety round/check completed  Taken 4/18/2025 1600 by Natali Sutton RN  Safety Promotion/Fall Prevention: safety round/check completed  Taken 4/18/2025 1400 by Natali Sutton RN  Safety Promotion/Fall Prevention: safety round/check completed  Taken 4/18/2025 1200 by Natali Sutton RN  Safety Promotion/Fall Prevention: safety round/check completed  Taken 4/18/2025 1000 by Natali Sutton RN  Safety Promotion/Fall Prevention: safety round/check completed  Taken 4/18/2025 0800 by Natali Sutton RN  Safety Promotion/Fall Prevention: safety round/check completed  Intervention: Prevent Skin Injury  Recent Flowsheet Documentation  Taken 4/18/2025 1800 by Natali Sutton RN  Body Position: dangle, side of bed  Taken 4/18/2025 1600 by Natali Sutton RN  Body Position:   weight shifting   tilted   right   heels elevated  Taken 4/18/2025 1400 by Natali Sutton RN  Body Position:   weight shifting   dangle, side of bed  Taken 4/18/2025 1200 by Natali Sutton RN  Body Position: dangle, side of bed  Taken 4/18/2025 1000 by Natali Sutton RN  Body Position:   weight shifting   side-lying   left  Taken 4/18/2025 0800 by Natali Sutton RN  Body Position:   weight shifting   dangle, side of bed  Intervention: Prevent Infection  Recent Flowsheet Documentation  Taken 4/18/2025 1800 by Natali Sutton RN  Infection Prevention: single patient room provided  Taken 4/18/2025 1600 by Natali Sutton RN  Infection Prevention: single patient room provided  Taken 4/18/2025 1400 by Natali Sutton RN  Infection Prevention: single patient  room provided  Taken 4/18/2025 1200 by Natali Sutton RN  Infection Prevention: single patient room provided  Taken 4/18/2025 1000 by Natali Sutton RN  Infection Prevention: single patient room provided  Taken 4/18/2025 0800 by Natali Sutton RN  Infection Prevention: single patient room provided  Goal: Optimal Comfort and Wellbeing  Outcome: Progressing  Intervention: Provide Person-Centered Care  Recent Flowsheet Documentation  Taken 4/18/2025 0800 by Natali Sutton RN  Trust Relationship/Rapport:   care explained   choices provided  Goal: Readiness for Transition of Care  Outcome: Progressing  Goal: Plan of Care Review  Outcome: Progressing  Goal: Patient-Specific Goal (Individualized)  Outcome: Progressing  Goal: Absence of Hospital-Acquired Illness or Injury  Outcome: Progressing  Intervention: Identify and Manage Fall Risk  Recent Flowsheet Documentation  Taken 4/18/2025 1800 by Natali Sutton RN  Safety Promotion/Fall Prevention: safety round/check completed  Taken 4/18/2025 1600 by Natali Sutton RN  Safety Promotion/Fall Prevention: safety round/check completed  Taken 4/18/2025 1400 by Natali Sutton RN  Safety Promotion/Fall Prevention: safety round/check completed  Taken 4/18/2025 1200 by Natali Sutton RN  Safety Promotion/Fall Prevention: safety round/check completed  Taken 4/18/2025 1000 by Natali Sutton RN  Safety Promotion/Fall Prevention: safety round/check completed  Taken 4/18/2025 0800 by Natali Sutton RN  Safety Promotion/Fall Prevention: safety round/check completed  Intervention: Prevent Skin Injury  Recent Flowsheet Documentation  Taken 4/18/2025 1800 by Natali Sutton RN  Body Position: dangle, side of bed  Taken 4/18/2025 1600 by Natail Sutton RN  Body Position:   weight shifting   tilted   right   heels elevated  Taken 4/18/2025 1400 by Natali Sutton RN  Body Position:   weight shifting   dangle, side of bed  Taken 4/18/2025 1200 by Natali Sutton RN  Body  Position: dangle, side of bed  Taken 4/18/2025 1000 by Natali Sutton RN  Body Position:   weight shifting   side-lying   left  Taken 4/18/2025 0800 by Natali Sutton RN  Body Position:   weight shifting   dangle, side of bed  Intervention: Prevent Infection  Recent Flowsheet Documentation  Taken 4/18/2025 1800 by Natali Sutton RN  Infection Prevention: single patient room provided  Taken 4/18/2025 1600 by Natali Sutton RN  Infection Prevention: single patient room provided  Taken 4/18/2025 1400 by Natali Sutton RN  Infection Prevention: single patient room provided  Taken 4/18/2025 1200 by Natali Sutton RN  Infection Prevention: single patient room provided  Taken 4/18/2025 1000 by Natali Sutton RN  Infection Prevention: single patient room provided  Taken 4/18/2025 0800 by Natali Sutton RN  Infection Prevention: single patient room provided  Goal: Optimal Comfort and Wellbeing  Outcome: Progressing  Intervention: Provide Person-Centered Care  Recent Flowsheet Documentation  Taken 4/18/2025 0800 by Natali Sutton RN  Trust Relationship/Rapport:   care explained   choices provided  Goal: Readiness for Transition of Care  Outcome: Progressing     Problem: Comorbidity Management  Goal: Maintenance of Heart Failure Symptom Control  Outcome: Progressing  Intervention: Maintain Heart Failure Management  Recent Flowsheet Documentation  Taken 4/18/2025 1800 by Natali Sutton RN  Medication Review/Management: medications reviewed  Taken 4/18/2025 1600 by Natali Sutton RN  Medication Review/Management: medications reviewed  Taken 4/18/2025 1400 by Natali Sutton RN  Medication Review/Management: medications reviewed  Taken 4/18/2025 1200 by Natali Sutton RN  Medication Review/Management: medications reviewed  Taken 4/18/2025 1000 by Natali Sutton RN  Medication Review/Management: medications reviewed  Taken 4/18/2025 0800 by Natali Sutton RN  Medication Review/Management: medications  reviewed     Problem: Skin Injury Risk Increased  Goal: Skin Health and Integrity  Outcome: Progressing  Intervention: Optimize Skin Protection  Recent Flowsheet Documentation  Taken 4/18/2025 1800 by Natali Sutton RN  Activity Management: activity encouraged  Taken 4/18/2025 1600 by Natali Sutton RN  Activity Management: activity encouraged  Taken 4/18/2025 1400 by Natali Sutton RN  Activity Management: activity encouraged  Taken 4/18/2025 1200 by Natali Sutton RN  Activity Management: activity encouraged  Taken 4/18/2025 1000 by Natali Sutton RN  Activity Management: activity encouraged  Taken 4/18/2025 0800 by Natali Sutton RN  Activity Management: activity encouraged     Problem: Heart Failure  Goal: Stable Heart Rate and Rhythm  Outcome: Progressing  Goal: Fluid and Electrolyte Balance  Outcome: Progressing  Goal: Effective Oxygenation and Ventilation  Outcome: Progressing  Intervention: Promote Airway Secretion Clearance  Recent Flowsheet Documentation  Taken 4/18/2025 1800 by Natali Sutton RN  Activity Management: activity encouraged  Taken 4/18/2025 1600 by Natali Sutton RN  Activity Management: activity encouraged  Taken 4/18/2025 1400 by Natali Sutton RN  Activity Management: activity encouraged  Taken 4/18/2025 1200 by Natali Sutton RN  Activity Management: activity encouraged  Taken 4/18/2025 1000 by Natali Sutton RN  Activity Management: activity encouraged  Taken 4/18/2025 0800 by Natali Sutton RN  Activity Management: activity encouraged  Goal: Effective Breathing Pattern During Sleep  Outcome: Progressing  Intervention: Monitor and Manage Obstructive Sleep Apnea  Recent Flowsheet Documentation  Taken 4/18/2025 1800 by Natali Sutton RN  Medication Review/Management: medications reviewed  Taken 4/18/2025 1600 by Natali Sutton RN  Medication Review/Management: medications reviewed  Taken 4/18/2025 1400 by Natali Sutton RN  Medication Review/Management:  medications reviewed  Taken 4/18/2025 1200 by Natali Sutton RN  Medication Review/Management: medications reviewed  Taken 4/18/2025 1000 by Natali Sutton RN  Medication Review/Management: medications reviewed  Taken 4/18/2025 0800 by Natali Sutton RN  Medication Review/Management: medications reviewed     Problem: Fall Injury Risk  Goal: Absence of Fall and Fall-Related Injury  Outcome: Progressing  Intervention: Identify and Manage Contributors  Recent Flowsheet Documentation  Taken 4/18/2025 1800 by Natali Sutton RN  Medication Review/Management: medications reviewed  Taken 4/18/2025 1600 by Natali Sutton RN  Medication Review/Management: medications reviewed  Taken 4/18/2025 1400 by Natali Sutton RN  Medication Review/Management: medications reviewed  Taken 4/18/2025 1200 by Natali Sutton RN  Medication Review/Management: medications reviewed  Taken 4/18/2025 1000 by Natlai Sutton RN  Medication Review/Management: medications reviewed  Taken 4/18/2025 0800 by Natali Sutton RN  Medication Review/Management: medications reviewed  Intervention: Promote Injury-Free Environment  Recent Flowsheet Documentation  Taken 4/18/2025 1800 by Natali Sutton RN  Safety Promotion/Fall Prevention: safety round/check completed  Taken 4/18/2025 1600 by Natali Sutton RN  Safety Promotion/Fall Prevention: safety round/check completed  Taken 4/18/2025 1400 by Natali Sutton RN  Safety Promotion/Fall Prevention: safety round/check completed  Taken 4/18/2025 1200 by Natali Sutton RN  Safety Promotion/Fall Prevention: safety round/check completed  Taken 4/18/2025 1000 by Natali Sutton RN  Safety Promotion/Fall Prevention: safety round/check completed  Taken 4/18/2025 0800 by Natali Sutton RN  Safety Promotion/Fall Prevention: safety round/check completed  Goal: Absence of Fall and Fall-Related Injury  Outcome: Progressing  Intervention: Identify and Manage Contributors  Recent Flowsheet  Documentation  Taken 4/18/2025 1800 by Natali Sutton RN  Medication Review/Management: medications reviewed  Taken 4/18/2025 1600 by Natali Sutton RN  Medication Review/Management: medications reviewed  Taken 4/18/2025 1400 by Natali Sutton RN  Medication Review/Management: medications reviewed  Taken 4/18/2025 1200 by Natali Sutton RN  Medication Review/Management: medications reviewed  Taken 4/18/2025 1000 by Natali Sutton RN  Medication Review/Management: medications reviewed  Taken 4/18/2025 0800 by Natali Sutton RN  Medication Review/Management: medications reviewed  Intervention: Promote Injury-Free Environment  Recent Flowsheet Documentation  Taken 4/18/2025 1800 by Natali Sutton RN  Safety Promotion/Fall Prevention: safety round/check completed  Taken 4/18/2025 1600 by Natali Sutton RN  Safety Promotion/Fall Prevention: safety round/check completed  Taken 4/18/2025 1400 by Natali Sutton RN  Safety Promotion/Fall Prevention: safety round/check completed  Taken 4/18/2025 1200 by Natali Sutton RN  Safety Promotion/Fall Prevention: safety round/check completed  Taken 4/18/2025 1000 by Natali Sutton RN  Safety Promotion/Fall Prevention: safety round/check completed  Taken 4/18/2025 0800 by Natali Sutton RN  Safety Promotion/Fall Prevention: safety round/check completed     Problem: Gas Exchange Impaired  Goal: Optimal Gas Exchange  Outcome: Progressing   Goal Outcome Evaluation:

## 2025-04-18 NOTE — PROGRESS NOTES
[facility]    Name:  Blane Dietz   Age:  89 y.o.  Sex:  male  :  1935  MRN:  [id number]   Visit Number:  [id number]  Admission Date:  2025  Date Of Service:  25  Primary Care Physician:  David Em MD     LOS: 11 days :    Chief Complaint:      Shortness of air, N/V/D     Subjective:    Feels good this morning.  Breathing feels good.  Denies any specific pain.    Hospital Course:    Blane Dietz is an 89-year-old man with past medical history of heart failure reduced ejection fraction, moderate AR, moderate MR, type 2 diabetes, cirrhosis, hypertension, prostate cancer, aortic stenosis status post AVR, HUSSAIN on CPAP, hyperlipidemia, history of diffuse large B-cell lymphoma of kidney, CKD, atrial fibrillation.  Presented to [facility] ED on 2025 with concern for several weeks of various degrees of diarrhea, shortness of air with cough.  Family reports the patient actually has been off of this medication for several weeks.  Denied any chest pain, abdominal pain.  Says that he does feel somewhat short of air.  Denied any fevers or chills.     ED summary: Afebrile, atrial fibrillation with RVR rate as high as 160 resolved with cardioversion, hypotension resolved with fluids and Levophed, vital signs stable on 2 L baseline.  EKG initially atrial fibrillation with RVR rate 161.  After resolution sinus rhythm with first-degree AV block, right bundle branch block.  Troponin monitoring 1, 41, ACS not suspected.  proBNP over 18,000.  Sodium 131, anion gap 13.5, BUN 95, creatinine 2.83, EGFR 20, blood glucose 246, calcium 8.0, magnesium 2.8, Phos 6.4, , ALT 77, bilirubin 1.3.  Lactate 2.7, procalcitonin 0.25, no leukocytosis, hemoglobin 12.2.  Blood cultures.  COVID-19 positive.  CT angio chest bilateral pleural effusions, no acute PE, 4.6 cm diameter dilated ascending aorta.  CT ab/pelvis findings suggesting decompensated cirrhotic portal hypertension,  edematous gallbladder likely secondary to third spacing, thick-walled descending colon and distal rectum indeterminant.  He was provided IV amiodarone, vancomycin and cefepime, etomidate, Xylocaine, 2 L bolus.  Started on Levophed.  ED physician placed central line.    Review of Systems:     All systems were reviewed and negative except as mentioned in subjective, assessment and plan.    Vital Signs:    Temp:  [97.1 °F (36.2 °C)-98.3 °F (36.8 °C)] 97.1 °F (36.2 °C)  Heart Rate:  [] 114  Resp:  [18-20] 18  BP: (104-131)/(55-65) 104/61    Intake and output:    I/O last 3 completed shifts:  In: 840 [P.O.:840]  Out: -   No intake/output data recorded.    Physical Examination:    General Appearance:  Alert and cooperative.  Chronically ill-appearing.  No acute distress.  Generalized weakness noted.   Head:  Atraumatic and normocephalic.   Eyes: Conjunctivae and sclerae normal, no icterus. No pallor. Bruised left eye improving   Throat: No oral lesions, no thrush, oral mucosa moist.   Neck: Supple, trachea midline, no thyromegaly.   Lungs:   Breath sounds heard bilaterally equally.  No wheezing.  Bibasilar rhonchi heard.  Decreased air movement bilaterally.  No Pleural rub or bronchial breathing.  Unlabored.  On supplemental oxygen.   Heart:  Normal S1 and S2, no murmur, no gallop, no rub. No JVD.   Abdomen:   Normal bowel sounds, no masses, no organomegaly. Soft, nontender, nondistended, no rebound tenderness.   Extremities: Supple, no edema, no cyanosis, no clubbing.    Skin: No bleeding.  Superficial chronic leg wound, see photo    Neurologic: Alert and oriented x 3. No facial asymmetry. Moves all four limbs. No tremors.      Laboratory results:    Results from last 7 days   Lab Units 04/17/25  0740 04/16/25  1046 04/15/25  0638   SODIUM mmol/L 137 134* 137   POTASSIUM mmol/L 4.9 5.0 4.7   CHLORIDE mmol/L 99 98 100   CO2 mmol/L 21.7* 20.0* 21.0*   BUN mg/dL 94* 88* 81*   CREATININE mg/dL 2.80* 2.61* 2.52*  "  CALCIUM mg/dL 8.3* 7.9* 8.0*   GLUCOSE mg/dL 136* 144* 91     Results from last 7 days   Lab Units 04/14/25  0936 04/13/25  0622 04/12/25  0620   WBC 10*3/mm3 5.44 5.13 4.85   HEMOGLOBIN g/dL 11.6* 11.5* 11.5*   HEMATOCRIT % 35.7* 35.8* 35.5*   PLATELETS 10*3/mm3 69* 71* 87*                     No results for input(s): \"PHART\", \"OAP2DCR\", \"PO2ART\", \"PVE7CSB\", \"BASEEXCESS\" in the last 8760 hours.   I have reviewed the patient's laboratory results.    Radiology results:    No radiology results from the last 24 hrs    I have reviewed the patient's radiology reports.    Medication Review:     I have reviewed the patient's active and prn medications.     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  Transaminitis  Hyperbilirubinemia  Chronic leg wound  AAA     Chronic: Heart failure reduced ejection fraction, moderate AR, moderate MR, type 2 diabetes, cirrhosis, hypertension, prostate cancer, aortic stenosis status post AVR, HUSSAIN on CPAP, hyperlipidemia, history of diffuse large B-cell lymphoma of kidney, CKD, atrial fibrillation    Plan:    Inpatient ICU admission 4/7/2025 with atrial fibrillation with RVR status post cardioversion in ED, hypotension likely multifactorial requiring Levophed, elevated troponins ACS not suspected likely demand ischemia, COVID-19 infection on his baseline 2 L, acute exacerbation of heart failure reduced ejection fraction, hyponatremia, 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    Patient and family decided for transition to LTC facility with hospice.     Atrial fibrillation with RVR, resolved  -Cardiology consultation, recommendations appreciated.  -Status post cardioversion in the ED.  - Per cardiology commendations, continuing IV amiodarone for a " total of 48-72 hours. Then can be changed to oral amiodarone therapy at 200 mg once per day.   - switched to p.o. amiodarone.    Elevated troponins  -ACS not suspected, likely demand ischemia.    Acute exacerbation of heart failure reduced ejection fraction  Bilateral pleural effusions.   -Previous 2D echo on file from December 2024 with EF of 38%  -Daily weight, strict ins and outs  -Diuretics per nephrology  -Cardiology consulted, appreciate recommendations    Hypotension/shock, improved, resolved  -Shock likely multifactorial given multiple chronic severe conditions, but could be related to sepsis  -Central line placed in ED  -added midodrine   -IV pressors titrated off.  - Transferred out of ICU and central line DC'd on 4/11    Colitis, suspected, resolved  -Ct showed: Thick-walled ascending colon and distal rectum are indeterminate. Correlate with signs or symptoms of colitis.  - Initially started vancomycin and cefepime on admission out of an abundance of caution.  -Blood cultures remains negative x 5 days  -Unsure if hypotension has an element of septic shock, likely multifactorial given multiple chronic severe conditions.  -GI panel and C. Difficile both negative  -MRSA screen negative, discontinue vancomycin, continue Zosyn  -WBC WNL, afebrile  - Finished 5 days of cefepime    COVID-19 infection  Chronic respiratory failure  -On his 2 L baseline.  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  -Avoid nephrotoxic drugs  -Diuretics per nephrology  - I personally discussed with Dr. Foote today.    Transaminitis, improved  Hyperbilirubinemia, improved  Cirrhosis  -Findings on imaging suggesting decompensated cirrhotic portal hypertension. Edematous gallbladder is likely secondary to 3rd spacing of fluid rather than cholecystitis.  -Right upper  quadrant ultrasound ordered gallbladder sludge but no gallstones, shows cirrhosis.  - Abnormal LFTs likely secondary to chronic cirrhosis.  - Monitor liver function  - Held statin    Chronic leg wound  -Wound care.    AAA  -Ascending aorta 4.6 cm diameter. Similar to prior.   -Recommend follow-up.     Chronic: Heart failure reduced ejection fraction, moderate AR, moderate MR, type 2 diabetes, cirrhosis, hypertension, prostate cancer, aortic stenosis status post AVR, HSUSAIN on CPAP, hyperlipidemia, history of diffuse large B-cell lymphoma of kidney, CKD, atrial fibrillation  -Subcutaneous insulin protocol.  -Continue other home medications.     -I have reviewed the copied text and it is accurate as of 4/18/2025     - Patient and family decided for LTC care with hospice.    DVT Prophylaxis: Eliquis  Code Status: DNR/DNI  Diet: Cardiac/renal  Discharge Plan: Pending LTC placement with hospice.    Updated daughter Susana during course.     Brian Joseph Kerley, DO  04/18/25  07:10 EDT    Dictated utilizing Dragon dictation.

## 2025-04-18 NOTE — TELEPHONE ENCOUNTER
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

## 2025-04-18 NOTE — PROGRESS NOTES
[facility] Progress Note  [facility]. [place]        Patient Name: Blane Dietz  : 1935  MRN: [id number]   LOS: 11 days    Patient Care Team:  David Em MD as PCP - General (Internal Medicine)  Ha Toussaint MD as Consulting Physician (Cardiology)  Shannan Ramon MD as Consulting Physician (Hematology and Oncology)  Xavier Boston MD as Consulting Physician (Endocrinology)  Brionna Weinstein DO as Surgeon (General Surgery)  Sophia Ferreira PA as Physician Assistant (Endocrinology)  Abbey Reyez APRN as Nurse Practitioner (Nurse Practitioner)    Chief Complaint:    Chief Complaint   Patient presents with    Shortness of Breath     Primary Care Physician:  David Em MD  Date of admission: 2025    Subjective     Interval History:   Follow-up acute on 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   Patient is awake alert and interactive denies having any chest pain shortness of breath.  He said he feels better.  Still feels gets short of breath very easily with any physical activity.  Patient has made up his mind to go to short-term rehab with hospice.  Patient is laying in the bed with a BiPAP on.  Events noted from last 24 hours.  I reviewed the chart and other providers notes, labs and procedures done since my last note.    Review of Systems:   As noted above.    Objective     Vitals:   Temp:  [95.1 °F (35.1 °C)-98.3 °F (36.8 °C)] 95.1 °F (35.1 °C)  Heart Rate:  [] 65  Resp:  [18-20] 18  BP: (104-128)/(55-70) 128/70    Intake/Output Summary (Last 24 hours) at 2025 1145  Last data filed at 2025 1733  Gross per 24 hour   Intake 360 ml   Output --   Net 360 ml       Physical Exam:    General Appearance: alert, oriented x 3, no acute distress   Skin: warm and dry  HEENT: oral mucosa normal, nonicteric sclera  Neck: supple, no JVD  Lungs: Decreased breath sounds with occasional basal crackles.  Heart: IRRR,    Abdomen: obese, soft, nontender, non distended and positive bowel sounds.  : no palpable bladder  Extremities: Trace edema, no cyanosis or clubbing  Neuro: normal speech and mental status     Scheduled Meds:     Current Facility-Administered Medications   Medication Dose Route Frequency Provider Last Rate Last Admin    amiodarone (PACERONE) tablet 200 mg  200 mg Oral Q24H Deanne Bowles MD   200 mg at 04/18/25 0846    apixaban (ELIQUIS) tablet 2.5 mg  2.5 mg Oral Q12H Deanne Bowles MD   2.5 mg at 04/18/25 0846    benzocaine (AMERICAINE) 20 % rectal ointment   Rectal BID PRN Deanne Bowles MD   Given at 04/10/25 1607    dextrose (D50W) (25 g/50 mL) IV injection 25 g  25 g Intravenous Q15 Min PRN Deanne Bowles MD        dextrose (GLUTOSE) oral gel 15 g  15 g Oral Q15 Min PRN Deanne Bowles MD        glucagon (GLUCAGEN) injection 1 mg  1 mg Intramuscular Q15 Min PRN Deanne Bowles MD        hydrOXYzine pamoate (VISTARIL) capsule 50 mg  50 mg Oral TID PRN Deanne Bowles MD   50 mg at 04/16/25 2103    Insulin Lispro (humaLOG) injection 2-9 Units  2-9 Units Subcutaneous 4x Daily AC & at Bedtime Deanne Bowles MD   2 Units at 04/18/25 0849    metoprolol succinate XL (TOPROL-XL) 24 hr tablet 25 mg  25 mg Oral Daily Deanne Bowles MD   25 mg at 04/18/25 0846    midodrine (PROAMATINE) tablet 5 mg  5 mg Oral TID AC Deanne Bowles MD   5 mg at 04/18/25 0846    nitroglycerin (NITROSTAT) SL tablet 0.4 mg  0.4 mg Sublingual Q5 Min PRN Deanne Bowles MD        ondansetron (ZOFRAN) injection 4 mg  4 mg Intravenous Q6H PRN Deanne Bowles MD        [Held by provider] pravastatin (PRAVACHOL) tablet 40 mg  40 mg Oral Daily Kerley, Brian Joseph, DO   40 mg at 04/08/25 1039    sodium chloride 0.9 % flush 10 mL  10 mL Intravenous Q12H Deanne Bolwes MD   10 mL at 04/18/25 0849    sodium chloride 0.9 % flush 10 mL  10 mL Intravenous PRN Deanne Bowles MD   10 mL at 04/13/25 2126    sodium chloride 0.9 %  "infusion 40 mL  40 mL Intravenous PRN Deanne Bowles MD           amiodarone, 200 mg, Oral, Q24H  apixaban, 2.5 mg, Oral, Q12H  insulin lispro, 2-9 Units, Subcutaneous, 4x Daily AC & at Bedtime  metoprolol succinate XL, 25 mg, Oral, Daily  midodrine, 5 mg, Oral, TID AC  [Held by provider] pravastatin, 40 mg, Oral, Daily  sodium chloride, 10 mL, Intravenous, Q12H        IV Meds:          Results Reviewed:   I have personally reviewed the results from the time of this admission to 4/18/2025 11:45 EDT     Results from last 7 days   Lab Units 04/18/25  0957 04/17/25  0740 04/16/25  1046   SODIUM mmol/L 136 137 134*   POTASSIUM mmol/L 4.6 4.9 5.0   CHLORIDE mmol/L 102 99 98   CO2 mmol/L 18.9* 21.7* 20.0*   BUN mg/dL 93* 94* 88*   CREATININE mg/dL 2.95* 2.80* 2.61*   CALCIUM mg/dL 8.1* 8.3* 7.9*   GLUCOSE mg/dL 197* 136* 144*       Estimated Creatinine Clearance: 18.4 mL/min (A) (by C-G formula based on SCr of 2.95 mg/dL (H)).    Results from last 7 days   Lab Units 04/18/25  0957 04/17/25  0740 04/16/25  1046   PHOSPHORUS mg/dL 4.6* 5.0* 4.5               Results from last 7 days   Lab Units 04/14/25  0936 04/13/25  0622 04/12/25  0620   WBC 10*3/mm3 5.44 5.13 4.85   HEMOGLOBIN g/dL 11.6* 11.5* 11.5*   PLATELETS 10*3/mm3 69* 71* 87*             Brief Urine Lab Results  (Last result in the past 365 days)        Color   Clarity   Blood   Leuk Est   Nitrite   Protein   CREAT   Urine HCG        04/08/25 1823 Yellow   Clear   Negative   Small (1+)   Negative   30 mg/dL (1+)                   No results found for: \"UTPCR\"    Imaging Results (Last 24 Hours)       ** No results found for the last 24 hours. **                Assessment / Plan     ASSESSMENT:    COVID-19    Acute kidney injury: Patient does have significant worsening of renal function from his baseline likely hemodynamically mediated as well as cardiorenal syndrome.  Continue to optimize medications.  He does not have any significant electrolyte abnormalities " bicarb is stable as well.  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  Valvular heart disease: Continue to optimize medications.  Coronary artery disease: Not a candidate for any aggressive cardiac workup, cardiac evaluation is in progress.  Congestive heart failure: Will need to optimize his volume status will adjust diuretics as needed.  Atrial fibrillation: Currently on amiodarone with some improvement in atrial fibrillation.  Type 2 diabetes: Continue with the sliding scale  Peripheral vascular disease: Continue to optimize medications no intervention planned.  Obstructive sleep apnea: Stable use of BiPAP.      PLAN:  Worsening renal function noted, will go ahead and stop the Lasix 20 mg daily.  Hold off giving any fluids.  Likely will gradually improve over the next few days.  Details were discussed with the patient no family in the room.  He has decided to go to short-term rehab.  Awaiting placement.  Details were also discussed with the hospitalist service and or other providers as needed.    Continue with rest of the current treatment plan, and monitor with surveillance labs, adjust medication doses to the eGFR.  Further recommendations will depend on clinical course of the patient during the current hospitalization.   I have reviewed the copied text to this note, it was edited and the changes made as needed.  It is accurate to the point, when the note was signed today.     Thank you for involving us in the care of Blane Dietz.  Please feel free to call with any questions.    Andriy Foote MD, ROBERTON  04/18/25  11:45 EDT    [facility]  [phone]      Part of this note may be an electronic transcription/translation of spoken language to printed text using the Dragon Dictation System.

## 2025-04-18 NOTE — CASE MANAGEMENT/SOCIAL WORK
Case Management/Social Work    Patient Name:  Blane Dietz  YOB: 1935  MRN: [id number]  Admit Date:  4/7/2025    Plan: LTC private pay with hospice.     HARPAL called [facility] and spoke with Mahnaz/Wandy who states no male LTC beds at this time. HARPAL then spoke with Di/Admissions at [facility]. She states [facility] does not have any beds at this time, but will review for [facility]. She will call this SW back when able. HARPAL following.     11:12 EDT Di/[facility] states pt is clinically approved. She called daughter/POA Susana to discuss finances. POA then called this HARPAL asking questions about facility. HARPAL explained the process of referrals and bed offers. Pt would be private pay, so they would be speaking with her regarding finances. POA would like to try other facilities/areas first. HARPAL sent additional referrals. HARPAL did inform her that if no beds available, and no other bed offers, would need to take this as pt is medically ready. POA understood. HARPAL then called [facility] in [place] to see if had any beds, left message for HARPAL Mohamud. HARPAL then left message for Jessica/FRANCA who works with [facility] in [place] to see if could review referral and if any open beds. HARPAL following.     12:16 EDT HARPAL received call from [facility] who offered a semi-private bed. HARPAL contacted POA who is interested. Angelic/wandy who contact POA. They will update this HARPAL when able.     13:27 EDT HARPAL received call from [facility] who states she spoke with POA. POA will be touring facility tomorrow. Angelic will follow up with this HARPAL on Monday. HARPAL updated CM.       Electronically signed by:  LYNDSAY Bone  04/18/25 09:55 EDT

## 2025-04-18 NOTE — PLAN OF CARE
"Goal Outcome Evaluation:      Visited pt this afternoon as part of the Palliative Care Team.  Pt appeared to be sleeping but awaken when I entered the room.  He requested wanting to sit on the side of the bed.  With staff assistance we assisted pt to sit on side of bed.  He was agreeable to having the CPAP placed.      Once on side of bed when asked, he reported feeling a little better and \"it usually takes a little while\".  Pt's call light in reach.  Pt did request a blanket over his shoulders.      Requested RN (acting as PCT) to check on him in a little while.   She agreed to.      PC will continue to follow.                                      "

## 2025-04-18 NOTE — CASE MANAGEMENT/SOCIAL WORK
Case Management/Social Work    Patient Name:  Blane Dietz  YOB: 1935  MRN: [id number]  Admit Date:  4/7/2025        13:10 EDT   Discharge Plan       Row Name 04/18/25 1309       Plan    Plan DCP-Pending bed offer for LTC placement.                  0940: CM spoke with pt at bedside. Alert and answers questions coherently at this time. C-Pap on. 02 sat 95%. DCP remains pending bed offer for LTC placement. CM to follow.      Electronically signed by:  Jillian Reyes RN  04/18/25 13:10 EDT

## 2025-04-19 LAB
GLUCOSE BLDC GLUCOMTR-MCNC: 124 MG/DL (ref 70–130)
GLUCOSE BLDC GLUCOMTR-MCNC: 246 MG/DL (ref 70–130)
GLUCOSE BLDC GLUCOMTR-MCNC: 280 MG/DL (ref 70–130)
GLUCOSE BLDC GLUCOMTR-MCNC: 306 MG/DL (ref 70–130)

## 2025-04-19 PROCEDURE — 82948 REAGENT STRIP/BLOOD GLUCOSE: CPT

## 2025-04-19 PROCEDURE — 99231 SBSQ HOSP IP/OBS SF/LOW 25: CPT | Performed by: STUDENT IN AN ORGANIZED HEALTH CARE EDUCATION/TRAINING PROGRAM

## 2025-04-19 PROCEDURE — 82948 REAGENT STRIP/BLOOD GLUCOSE: CPT | Performed by: FAMILY MEDICINE

## 2025-04-19 PROCEDURE — 63710000001 INSULIN LISPRO (HUMAN) PER 5 UNITS: Performed by: FAMILY MEDICINE

## 2025-04-19 RX ADMIN — MIDODRINE HYDROCHLORIDE 5 MG: 5 TABLET ORAL at 12:21

## 2025-04-19 RX ADMIN — INSULIN LISPRO 6 UNITS: 100 INJECTION, SOLUTION INTRAVENOUS; SUBCUTANEOUS at 17:45

## 2025-04-19 RX ADMIN — AMIODARONE HYDROCHLORIDE 200 MG: 200 TABLET ORAL at 08:02

## 2025-04-19 RX ADMIN — INSULIN LISPRO 7 UNITS: 100 INJECTION, SOLUTION INTRAVENOUS; SUBCUTANEOUS at 20:50

## 2025-04-19 RX ADMIN — INSULIN LISPRO 4 UNITS: 100 INJECTION, SOLUTION INTRAVENOUS; SUBCUTANEOUS at 12:21

## 2025-04-19 RX ADMIN — MIDODRINE HYDROCHLORIDE 5 MG: 5 TABLET ORAL at 17:45

## 2025-04-19 RX ADMIN — HYDROXYZINE PAMOATE 50 MG: 25 CAPSULE ORAL at 20:45

## 2025-04-19 RX ADMIN — MIDODRINE HYDROCHLORIDE 5 MG: 5 TABLET ORAL at 08:01

## 2025-04-19 RX ADMIN — APIXABAN 2.5 MG: 2.5 TABLET, FILM COATED ORAL at 20:45

## 2025-04-19 RX ADMIN — APIXABAN 2.5 MG: 2.5 TABLET, FILM COATED ORAL at 08:00

## 2025-04-19 RX ADMIN — Medication 10 ML: at 08:02

## 2025-04-19 RX ADMIN — Medication 10 ML: at 20:45

## 2025-04-19 NOTE — PLAN OF CARE
Problem: Adult Inpatient Plan of Care  Goal: Plan of Care Review  Outcome: Progressing  Flowsheets (Taken 4/19/2025 0239)  Outcome Evaluation: Pt not sleeping much over night.  VSS, CPAP on.  Call light within reach.  Will continue to monitor.  Plan of Care Reviewed With: patient  Goal: Patient-Specific Goal (Individualized)  Outcome: Progressing  Goal: Absence of Hospital-Acquired Illness or Injury  Outcome: Progressing  Intervention: Identify and Manage Fall Risk  Recent Flowsheet Documentation  Taken 4/19/2025 0200 by Leslie Dowell RN  Safety Promotion/Fall Prevention: safety round/check completed  Taken 4/19/2025 0000 by Leslie Dowell RN  Safety Promotion/Fall Prevention: safety round/check completed  Taken 4/18/2025 2200 by Leslie Dowell RN  Safety Promotion/Fall Prevention: safety round/check completed  Taken 4/18/2025 2000 by Leslie Dowell RN  Safety Promotion/Fall Prevention:   activity supervised   assistive device/personal items within reach   clutter free environment maintained   fall prevention program maintained   nonskid shoes/slippers when out of bed   room organization consistent  Intervention: Prevent Skin Injury  Recent Flowsheet Documentation  Taken 4/19/2025 0200 by Leslie Dowell RN  Body Position: dangle, side of bed  Skin Protection: incontinence pads utilized  Taken 4/19/2025 0000 by Leslie Dowell RN  Body Position: weight shifting  Skin Protection: incontinence pads utilized  Taken 4/18/2025 2200 by Leslie Dowell RN  Body Position: weight shifting  Skin Protection: incontinence pads utilized  Taken 4/18/2025 2000 by Leslie Dowell RN  Body Position: weight shifting  Skin Protection: incontinence pads utilized  Intervention: Prevent and Manage VTE (Venous Thromboembolism) Risk  Recent Flowsheet Documentation  Taken 4/18/2025 2000 by Leslie Dowell RN  VTE Prevention/Management:   bilateral   SCDs (sequential compression devices) off   patient refused  intervention  Intervention: Prevent Infection  Recent Flowsheet Documentation  Taken 4/19/2025 0200 by Leslie Dowell RN  Infection Prevention: rest/sleep promoted  Taken 4/19/2025 0000 by Leslie Dowell RN  Infection Prevention: rest/sleep promoted  Taken 4/18/2025 2200 by Leslie Dowell RN  Infection Prevention: rest/sleep promoted  Taken 4/18/2025 2000 by Leslie Dowell RN  Infection Prevention:   environmental surveillance performed   hand hygiene promoted   single patient room provided  Goal: Optimal Comfort and Wellbeing  Outcome: Progressing  Intervention: Provide Person-Centered Care  Recent Flowsheet Documentation  Taken 4/18/2025 2000 by Leslie Dowell RN  Trust Relationship/Rapport: care explained  Goal: Readiness for Transition of Care  Outcome: Progressing  Goal: Plan of Care Review  Outcome: Progressing  Flowsheets (Taken 4/19/2025 0239)  Outcome Evaluation: Pt not sleeping much over night.  VSS, CPAP on.  Call light within reach.  Will continue to monitor.  Plan of Care Reviewed With: patient  Goal: Patient-Specific Goal (Individualized)  Outcome: Progressing  Goal: Absence of Hospital-Acquired Illness or Injury  Outcome: Progressing  Intervention: Identify and Manage Fall Risk  Recent Flowsheet Documentation  Taken 4/19/2025 0200 by Leslie Dowell RN  Safety Promotion/Fall Prevention: safety round/check completed  Taken 4/19/2025 0000 by Leslie Dowell RN  Safety Promotion/Fall Prevention: safety round/check completed  Taken 4/18/2025 2200 by Leslie Dowell RN  Safety Promotion/Fall Prevention: safety round/check completed  Taken 4/18/2025 2000 by Leslie Dowell RN  Safety Promotion/Fall Prevention:   activity supervised   assistive device/personal items within reach   clutter free environment maintained   fall prevention program maintained   nonskid shoes/slippers when out of bed   room organization consistent  Intervention: Prevent Skin Injury  Recent Flowsheet  Documentation  Taken 4/19/2025 0200 by Leslie oDwell RN  Body Position: dangle, side of bed  Skin Protection: incontinence pads utilized  Taken 4/19/2025 0000 by Leslie Dowell RN  Body Position: weight shifting  Skin Protection: incontinence pads utilized  Taken 4/18/2025 2200 by Leslie Dowell RN  Body Position: weight shifting  Skin Protection: incontinence pads utilized  Taken 4/18/2025 2000 by Leslie Dowell RN  Body Position: weight shifting  Skin Protection: incontinence pads utilized  Intervention: Prevent and Manage VTE (Venous Thromboembolism) Risk  Recent Flowsheet Documentation  Taken 4/18/2025 2000 by Leslie Dowell RN  VTE Prevention/Management:   bilateral   SCDs (sequential compression devices) off   patient refused intervention  Intervention: Prevent Infection  Recent Flowsheet Documentation  Taken 4/19/2025 0200 by Leslie Dowell RN  Infection Prevention: rest/sleep promoted  Taken 4/19/2025 0000 by Leslie Dowell RN  Infection Prevention: rest/sleep promoted  Taken 4/18/2025 2200 by Leslie Dowell RN  Infection Prevention: rest/sleep promoted  Taken 4/18/2025 2000 by Leslie Dowell RN  Infection Prevention:   environmental surveillance performed   hand hygiene promoted   single patient room provided  Goal: Optimal Comfort and Wellbeing  Outcome: Progressing  Intervention: Provide Person-Centered Care  Recent Flowsheet Documentation  Taken 4/18/2025 2000 by Leslie Dowell RN  Trust Relationship/Rapport: care explained  Goal: Readiness for Transition of Care  Outcome: Progressing     Problem: Comorbidity Management  Goal: Maintenance of Heart Failure Symptom Control  Outcome: Progressing  Intervention: Maintain Heart Failure Management  Recent Flowsheet Documentation  Taken 4/18/2025 2000 by Leslie Dowell RN  Medication Review/Management: medications reviewed     Problem: Skin Injury Risk Increased  Goal: Skin Health and Integrity  Outcome:  Progressing  Intervention: Optimize Skin Protection  Recent Flowsheet Documentation  Taken 4/19/2025 0200 by Leslie Dowell RN  Pressure Reduction Techniques: frequent weight shift encouraged  Head of Bed (HOB) Positioning: HOB elevated  Pressure Reduction Devices: foam padding utilized  Skin Protection: incontinence pads utilized  Taken 4/19/2025 0000 by Leslie Dowell RN  Pressure Reduction Techniques: frequent weight shift encouraged  Head of Bed (HOB) Positioning: HOB elevated  Pressure Reduction Devices: foam padding utilized  Skin Protection: incontinence pads utilized  Taken 4/18/2025 2200 by Leslie Dowell RN  Pressure Reduction Techniques: frequent weight shift encouraged  Head of Bed (HOB) Positioning: HOB elevated  Pressure Reduction Devices: foam padding utilized  Skin Protection: incontinence pads utilized  Taken 4/18/2025 2000 by Leslie Dowell RN  Activity Management: activity encouraged  Pressure Reduction Techniques: frequent weight shift encouraged  Head of Bed (HOB) Positioning: hospitals elevated  Pressure Reduction Devices: foam padding utilized  Skin Protection: incontinence pads utilized     Problem: Heart Failure  Goal: Stable Heart Rate and Rhythm  Outcome: Progressing  Goal: Fluid and Electrolyte Balance  Outcome: Progressing  Intervention: Monitor and Manage Fluid and Electrolyte Balance  Recent Flowsheet Documentation  Taken 4/18/2025 2000 by Leslie Dowell RN  Fluid/Electrolyte Management: fluids provided  Goal: Effective Oxygenation and Ventilation  Outcome: Progressing  Intervention: Promote Airway Secretion Clearance  Recent Flowsheet Documentation  Taken 4/18/2025 2000 by Leslie Dowell RN  Activity Management: activity encouraged  Cough And Deep Breathing: done independently per patient  Intervention: Optimize Oxygenation and Ventilation  Recent Flowsheet Documentation  Taken 4/19/2025 0200 by Leslie Dowell RN  Head of Bed (HOB) Positioning: HOB elevated  Taken  4/19/2025 0000 by Leslie Dowell RN  Head of Bed (HOB) Positioning: HOB elevated  Taken 4/18/2025 2200 by Leslie Dowell RN  Head of Bed (HOB) Positioning: HOB elevated  Taken 4/18/2025 2000 by Leslie Dowell RN  Head of Bed (HOB) Positioning: HOB elevated  Goal: Effective Breathing Pattern During Sleep  Outcome: Progressing  Intervention: Monitor and Manage Obstructive Sleep Apnea  Recent Flowsheet Documentation  Taken 4/18/2025 2000 by Leslie Dowell RN  Medication Review/Management: medications reviewed     Problem: Fall Injury Risk  Goal: Absence of Fall and Fall-Related Injury  Outcome: Progressing  Intervention: Identify and Manage Contributors  Recent Flowsheet Documentation  Taken 4/18/2025 2000 by Leslie Dowell RN  Medication Review/Management: medications reviewed  Self-Care Promotion: independence encouraged  Intervention: Promote Injury-Free Environment  Recent Flowsheet Documentation  Taken 4/19/2025 0200 by Leslie Dowell RN  Safety Promotion/Fall Prevention: safety round/check completed  Taken 4/19/2025 0000 by Leslie Dowell RN  Safety Promotion/Fall Prevention: safety round/check completed  Taken 4/18/2025 2200 by Leslie Dowell RN  Safety Promotion/Fall Prevention: safety round/check completed  Taken 4/18/2025 2000 by Leslie Dowell RN  Safety Promotion/Fall Prevention:   activity supervised   assistive device/personal items within reach   clutter free environment maintained   fall prevention program maintained   nonskid shoes/slippers when out of bed   room organization consistent  Goal: Absence of Fall and Fall-Related Injury  Outcome: Progressing  Intervention: Identify and Manage Contributors  Recent Flowsheet Documentation  Taken 4/18/2025 2000 by Leslie Dowell RN  Medication Review/Management: medications reviewed  Self-Care Promotion: independence encouraged  Intervention: Promote Injury-Free Environment  Recent Flowsheet Documentation  Taken 4/19/2025 0200 by  Dowell, Leslie, RN  Safety Promotion/Fall Prevention: safety round/check completed  Taken 4/19/2025 0000 by Leslie Dowell RN  Safety Promotion/Fall Prevention: safety round/check completed  Taken 4/18/2025 2200 by Leslie Dowell RN  Safety Promotion/Fall Prevention: safety round/check completed  Taken 4/18/2025 2000 by Leslie Dowell RN  Safety Promotion/Fall Prevention:   activity supervised   assistive device/personal items within reach   clutter free environment maintained   fall prevention program maintained   nonskid shoes/slippers when out of bed   room organization consistent     Problem: Gas Exchange Impaired  Goal: Optimal Gas Exchange  Outcome: Progressing  Intervention: Optimize Oxygenation and Ventilation  Recent Flowsheet Documentation  Taken 4/19/2025 0200 by Leslie Dowell RN  Head of Bed (HOB) Positioning: HOB elevated  Taken 4/19/2025 0000 by Leslie Dowell RN  Head of Bed (HOB) Positioning: HOB elevated  Taken 4/18/2025 2200 by Leslie Dowell RN  Head of Bed (HOB) Positioning: HOB elevated  Taken 4/18/2025 2000 by Leslie Dowell RN  Head of Bed (HOB) Positioning: HOB elevated   Goal Outcome Evaluation:  Plan of Care Reviewed With: patient           Outcome Evaluation: Pt not sleeping much over night.  VSS, CPAP on.  Call light within reach.  Will continue to monitor.

## 2025-04-19 NOTE — PLAN OF CARE
Problem: Adult Inpatient Plan of Care  Goal: Plan of Care Review  Outcome: Progressing  Goal: Patient-Specific Goal (Individualized)  Outcome: Progressing  Goal: Absence of Hospital-Acquired Illness or Injury  Outcome: Progressing  Intervention: Identify and Manage Fall Risk  Recent Flowsheet Documentation  Taken 4/19/2025 1600 by Natali Sutton RN  Safety Promotion/Fall Prevention: safety round/check completed  Taken 4/19/2025 1400 by Natali Sutton RN  Safety Promotion/Fall Prevention: safety round/check completed  Taken 4/19/2025 1200 by Natali Sutton RN  Safety Promotion/Fall Prevention: safety round/check completed  Taken 4/19/2025 0800 by Natali Sutton RN  Safety Promotion/Fall Prevention: safety round/check completed  Intervention: Prevent Skin Injury  Recent Flowsheet Documentation  Taken 4/19/2025 1600 by Natali Sutton RN  Body Position:   weight shifting   tilted   left   heels elevated  Taken 4/19/2025 1400 by Natali Sutton RN  Body Position: dangle, side of bed  Taken 4/19/2025 0800 by Natali Sutton RN  Body Position:   weight shifting   sitting up in bed  Skin Protection: incontinence pads utilized  Intervention: Prevent and Manage VTE (Venous Thromboembolism) Risk  Recent Flowsheet Documentation  Taken 4/19/2025 0800 by Natali Sutton RN  VTE Prevention/Management: patient refused intervention  Intervention: Prevent Infection  Recent Flowsheet Documentation  Taken 4/19/2025 1600 by Natali Sutton RN  Infection Prevention: single patient room provided  Taken 4/19/2025 1400 by Natali Sutton RN  Infection Prevention: single patient room provided  Taken 4/19/2025 1200 by Natali Sutton RN  Infection Prevention: single patient room provided  Taken 4/19/2025 0800 by Natali Sutton RN  Infection Prevention: single patient room provided  Goal: Optimal Comfort and Wellbeing  Outcome: Progressing  Intervention: Provide Person-Centered Care  Recent Flowsheet Documentation  Taken  4/19/2025 0800 by Natali Sutton RN  Trust Relationship/Rapport:   care explained   choices provided  Goal: Readiness for Transition of Care  Outcome: Progressing  Goal: Plan of Care Review  Outcome: Progressing  Goal: Patient-Specific Goal (Individualized)  Outcome: Progressing  Goal: Absence of Hospital-Acquired Illness or Injury  Outcome: Progressing  Intervention: Identify and Manage Fall Risk  Recent Flowsheet Documentation  Taken 4/19/2025 1600 by Natali Sutton RN  Safety Promotion/Fall Prevention: safety round/check completed  Taken 4/19/2025 1400 by Natali Sutton RN  Safety Promotion/Fall Prevention: safety round/check completed  Taken 4/19/2025 1200 by Natali Sutton RN  Safety Promotion/Fall Prevention: safety round/check completed  Taken 4/19/2025 0800 by Natali Sutton RN  Safety Promotion/Fall Prevention: safety round/check completed  Intervention: Prevent Skin Injury  Recent Flowsheet Documentation  Taken 4/19/2025 1600 by Natali Sutton RN  Body Position:   weight shifting   tilted   left   heels elevated  Taken 4/19/2025 1400 by Natali Sutton RN  Body Position: dangle, side of bed  Taken 4/19/2025 0800 by Natali Sutton RN  Body Position:   weight shifting   sitting up in bed  Skin Protection: incontinence pads utilized  Intervention: Prevent and Manage VTE (Venous Thromboembolism) Risk  Recent Flowsheet Documentation  Taken 4/19/2025 0800 by Natali Sutton RN  VTE Prevention/Management: patient refused intervention  Intervention: Prevent Infection  Recent Flowsheet Documentation  Taken 4/19/2025 1600 by Natali Sutton RN  Infection Prevention: single patient room provided  Taken 4/19/2025 1400 by Natali Sutton RN  Infection Prevention: single patient room provided  Taken 4/19/2025 1200 by Natali Sutton RN  Infection Prevention: single patient room provided  Taken 4/19/2025 0800 by Natali Sutton RN  Infection Prevention: single patient room provided  Goal: Optimal  Comfort and Wellbeing  Outcome: Progressing  Intervention: Provide Person-Centered Care  Recent Flowsheet Documentation  Taken 4/19/2025 0800 by Natali Sutton RN  Trust Relationship/Rapport:   care explained   choices provided  Goal: Readiness for Transition of Care  Outcome: Progressing     Problem: Comorbidity Management  Goal: Maintenance of Heart Failure Symptom Control  Outcome: Progressing  Intervention: Maintain Heart Failure Management  Recent Flowsheet Documentation  Taken 4/19/2025 1600 by Natali Sutton RN  Medication Review/Management: medications reviewed  Taken 4/19/2025 1400 by Natali Sutton RN  Medication Review/Management: medications reviewed  Taken 4/19/2025 1200 by Natali Sutton RN  Medication Review/Management: medications reviewed  Taken 4/19/2025 0800 by Natali Sutton RN  Medication Review/Management: medications reviewed     Problem: Skin Injury Risk Increased  Goal: Skin Health and Integrity  Outcome: Progressing  Intervention: Optimize Skin Protection  Recent Flowsheet Documentation  Taken 4/19/2025 1600 by Natali Sutton RN  Activity Management: activity encouraged  Taken 4/19/2025 1400 by Natali Sutton RN  Activity Management: activity encouraged  Taken 4/19/2025 1200 by Natali Sutton RN  Activity Management: activity encouraged  Taken 4/19/2025 0800 by Natali Sutton RN  Activity Management: activity encouraged  Pressure Reduction Techniques:   weight shift assistance provided   frequent weight shift encouraged   heels elevated off bed  Head of Bed (HOB) Positioning: HOB at 60-90 degrees  Pressure Reduction Devices:   foam padding utilized   positioning supports utilized  Skin Protection: incontinence pads utilized     Problem: Heart Failure  Goal: Stable Heart Rate and Rhythm  Outcome: Progressing  Goal: Fluid and Electrolyte Balance  Outcome: Progressing  Goal: Effective Oxygenation and Ventilation  Outcome: Progressing  Intervention: Promote Airway Secretion  Clearance  Recent Flowsheet Documentation  Taken 4/19/2025 1600 by Natali Sutton RN  Activity Management: activity encouraged  Taken 4/19/2025 1400 by Natali Sutton RN  Activity Management: activity encouraged  Taken 4/19/2025 1200 by Natali Sutton RN  Activity Management: activity encouraged  Taken 4/19/2025 0800 by Natali Suttno RN  Activity Management: activity encouraged  Cough And Deep Breathing: done independently per patient  Intervention: Optimize Oxygenation and Ventilation  Recent Flowsheet Documentation  Taken 4/19/2025 0800 by Natali Sutton RN  Head of Bed (HOB) Positioning: HOB at 60-90 degrees  Goal: Effective Breathing Pattern During Sleep  Outcome: Progressing  Intervention: Monitor and Manage Obstructive Sleep Apnea  Recent Flowsheet Documentation  Taken 4/19/2025 1600 by Natali Sutton RN  Medication Review/Management: medications reviewed  Taken 4/19/2025 1400 by Natali Sutton RN  Medication Review/Management: medications reviewed  Taken 4/19/2025 1200 by Natali Sutton RN  Medication Review/Management: medications reviewed  Taken 4/19/2025 0800 by Natali Sutton RN  Medication Review/Management: medications reviewed     Problem: Fall Injury Risk  Goal: Absence of Fall and Fall-Related Injury  Outcome: Progressing  Intervention: Identify and Manage Contributors  Recent Flowsheet Documentation  Taken 4/19/2025 1600 by Natali Sutton RN  Medication Review/Management: medications reviewed  Taken 4/19/2025 1400 by Natali Sutton RN  Medication Review/Management: medications reviewed  Taken 4/19/2025 1200 by Natali Sutton RN  Medication Review/Management: medications reviewed  Taken 4/19/2025 0800 by Natali Sutton RN  Medication Review/Management: medications reviewed  Intervention: Promote Injury-Free Environment  Recent Flowsheet Documentation  Taken 4/19/2025 1600 by Natali Sutton RN  Safety Promotion/Fall Prevention: safety round/check completed  Taken 4/19/2025  1400 by Sutton, Natali, RN  Safety Promotion/Fall Prevention: safety round/check completed  Taken 4/19/2025 1200 by Natali Sutton RN  Safety Promotion/Fall Prevention: safety round/check completed  Taken 4/19/2025 0800 by Natali Sutton RN  Safety Promotion/Fall Prevention: safety round/check completed  Goal: Absence of Fall and Fall-Related Injury  Outcome: Progressing  Intervention: Identify and Manage Contributors  Recent Flowsheet Documentation  Taken 4/19/2025 1600 by Natali Sutton RN  Medication Review/Management: medications reviewed  Taken 4/19/2025 1400 by Natali Sutton RN  Medication Review/Management: medications reviewed  Taken 4/19/2025 1200 by Natali Sutton RN  Medication Review/Management: medications reviewed  Taken 4/19/2025 0800 by Natali Sutton RN  Medication Review/Management: medications reviewed  Intervention: Promote Injury-Free Environment  Recent Flowsheet Documentation  Taken 4/19/2025 1600 by Natali Sutton RN  Safety Promotion/Fall Prevention: safety round/check completed  Taken 4/19/2025 1400 by Natali Sutton RN  Safety Promotion/Fall Prevention: safety round/check completed  Taken 4/19/2025 1200 by Natali Sutton RN  Safety Promotion/Fall Prevention: safety round/check completed  Taken 4/19/2025 0800 by Natali Sutton RN  Safety Promotion/Fall Prevention: safety round/check completed     Problem: Gas Exchange Impaired  Goal: Optimal Gas Exchange  Outcome: Progressing  Intervention: Optimize Oxygenation and Ventilation  Recent Flowsheet Documentation  Taken 4/19/2025 0800 by Natali Sutton RN  Head of Bed (HOB) Positioning: HOB at 60-90 degrees   Goal Outcome Evaluation:

## 2025-04-19 NOTE — PROGRESS NOTES
[facility] Progress Note  [facility]. [place]        Patient Name: Blane Dietz  : 1935  MRN: [id number]   LOS: 12 days    Patient Care Team:  David Em MD as PCP - General (Internal Medicine)  Ha Toussaint MD as Consulting Physician (Cardiology)  Shannan Ramon MD as Consulting Physician (Hematology and Oncology)  Xavier Boston MD as Consulting Physician (Endocrinology)  Brionna Weinstein DO as Surgeon (General Surgery)  Sophia Ferreira PA as Physician Assistant (Endocrinology)  Abbey Reyez APRN as Nurse Practitioner (Nurse Practitioner)    Chief Complaint:    Chief Complaint   Patient presents with    Shortness of Breath     Primary Care Physician:  David Em MD  Date of admission: 2025    Subjective     Interval History:   Follow-up acute on 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   Patient is awake alert and interactive denies having any chest pain shortness of breath.  He said he feels better.  Still feels gets short of breath very easily with any physical activity.  Patient has made up his mind to go to short-term rehab with hospice.  Patient is laying in the bed with a BiPAP on.  He appears very comfortable.  Events noted from last 24 hours.  I reviewed the chart and other providers notes, labs and procedures done since my last note.    Review of Systems:   As noted above.    Objective     Vitals:   Temp:  [96.8 °F (36 °C)-97.5 °F (36.4 °C)] 97.3 °F (36.3 °C)  Heart Rate:  [58-65] 58  Resp:  [14-16] 14  BP: (108-128)/(53-88) 110/56    Intake/Output Summary (Last 24 hours) at 2025 0718  Last data filed at 2025 1422  Gross per 24 hour   Intake 180 ml   Output --   Net 180 ml       Physical Exam:    General Appearance: alert, oriented x 3, no acute distress   Skin: warm and dry  HEENT: oral mucosa normal, nonicteric sclera  Neck: supple, no JVD  Lungs: Decreased breath sounds with occasional basal  crackles.    Heart: IRRR,   Abdomen: obese, soft, nontender, non distended and positive bowel sounds.  : no palpable bladder  Extremities: Trace edema, no cyanosis or clubbing  Neuro: normal speech and mental status     Scheduled Meds:     Current Facility-Administered Medications   Medication Dose Route Frequency Provider Last Rate Last Admin    amiodarone (PACERONE) tablet 200 mg  200 mg Oral Q24H Deanne Bowles MD   200 mg at 04/18/25 0846    apixaban (ELIQUIS) tablet 2.5 mg  2.5 mg Oral Q12H Deanne Bowles MD   2.5 mg at 04/18/25 2134    benzocaine (AMERICAINE) 20 % rectal ointment   Rectal BID PRN Deanne Bowles MD   Given at 04/10/25 1607    dextrose (D50W) (25 g/50 mL) IV injection 25 g  25 g Intravenous Q15 Min PRN Deanne Bowles MD        dextrose (GLUTOSE) oral gel 15 g  15 g Oral Q15 Min PRN Deanne Bowles MD        glucagon (GLUCAGEN) injection 1 mg  1 mg Intramuscular Q15 Min PRN Deanne Bowles MD        hydrOXYzine pamoate (VISTARIL) capsule 50 mg  50 mg Oral TID PRN Deanne Bowles MD   50 mg at 04/18/25 2133    Insulin Lispro (humaLOG) injection 2-9 Units  2-9 Units Subcutaneous 4x Daily AC & at Bedtime Deanne Bowles MD   4 Units at 04/18/25 2134    metoprolol succinate XL (TOPROL-XL) 24 hr tablet 25 mg  25 mg Oral Daily Deanne Bowles MD   25 mg at 04/18/25 0846    midodrine (PROAMATINE) tablet 5 mg  5 mg Oral TID AC Deanne Bowles MD   5 mg at 04/18/25 1819    nitroglycerin (NITROSTAT) SL tablet 0.4 mg  0.4 mg Sublingual Q5 Min PRN Deanne Bowles MD        ondansetron (ZOFRAN) injection 4 mg  4 mg Intravenous Q6H PRN Deanne Bowles MD        [Held by provider] pravastatin (PRAVACHOL) tablet 40 mg  40 mg Oral Daily Kerley, Brian Joseph,    40 mg at 04/08/25 1039    sodium chloride 0.9 % flush 10 mL  10 mL Intravenous Q12H Deanne Bowles MD   10 mL at 04/18/25 0849    sodium chloride 0.9 % flush 10 mL  10 mL Intravenous PRN Deanne Bowles MD   10 mL at 04/13/25 5123     "sodium chloride 0.9 % infusion 40 mL  40 mL Intravenous PRN Deanne Bowles MD           amiodarone, 200 mg, Oral, Q24H  apixaban, 2.5 mg, Oral, Q12H  insulin lispro, 2-9 Units, Subcutaneous, 4x Daily AC & at Bedtime  metoprolol succinate XL, 25 mg, Oral, Daily  midodrine, 5 mg, Oral, TID AC  [Held by provider] pravastatin, 40 mg, Oral, Daily  sodium chloride, 10 mL, Intravenous, Q12H        IV Meds:          Results Reviewed:   I have personally reviewed the results from the time of this admission to 4/19/2025 07:18 EDT     Results from last 7 days   Lab Units 04/18/25  0957 04/17/25  0740 04/16/25  1046   SODIUM mmol/L 136 137 134*   POTASSIUM mmol/L 4.6 4.9 5.0   CHLORIDE mmol/L 102 99 98   CO2 mmol/L 18.9* 21.7* 20.0*   BUN mg/dL 93* 94* 88*   CREATININE mg/dL 2.95* 2.80* 2.61*   CALCIUM mg/dL 8.1* 8.3* 7.9*   GLUCOSE mg/dL 197* 136* 144*       Estimated Creatinine Clearance: 18.4 mL/min (A) (by C-G formula based on SCr of 2.95 mg/dL (H)).    Results from last 7 days   Lab Units 04/18/25  0957 04/17/25  0740 04/16/25  1046   PHOSPHORUS mg/dL 4.6* 5.0* 4.5               Results from last 7 days   Lab Units 04/14/25  0936 04/13/25  0622   WBC 10*3/mm3 5.44 5.13   HEMOGLOBIN g/dL 11.6* 11.5*   PLATELETS 10*3/mm3 69* 71*             Brief Urine Lab Results  (Last result in the past 365 days)        Color   Clarity   Blood   Leuk Est   Nitrite   Protein   CREAT   Urine HCG        04/08/25 1823 Yellow   Clear   Negative   Small (1+)   Negative   30 mg/dL (1+)                   No results found for: \"UTPCR\"    Imaging Results (Last 24 Hours)       ** No results found for the last 24 hours. **                Assessment / Plan     ASSESSMENT:    COVID-19    Acute kidney injury: Patient does have significant worsening of renal function from his baseline likely hemodynamically mediated as well as cardiorenal syndrome.  Continue to optimize medications.  He does not have any significant electrolyte abnormalities bicarb is " stable as well.  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  Valvular heart disease: Continue to optimize medications.  Coronary artery disease: Not a candidate for any aggressive cardiac workup, cardiac evaluation is in progress.  Congestive heart failure: Will need to optimize his volume status will adjust diuretics as needed.  Atrial fibrillation: Currently on amiodarone with some improvement in atrial fibrillation.  Type 2 diabetes: Continue with the sliding scale  Peripheral vascular disease: Continue to optimize medications no intervention planned.  Obstructive sleep apnea: Stable use of BiPAP.      PLAN:  No new labs noted.  Clinically appears to be stable.  Will recheck labs in the morning.  Details were discussed with the patient no family in the room.  He has decided to go to short-term rehab.  Awaiting placement.  Details were also discussed with the hospitalist service and or other providers as needed.    Continue with rest of the current treatment plan, and monitor with surveillance labs, adjust medication doses to the eGFR.  Further recommendations will depend on clinical course of the patient during the current hospitalization.   I have reviewed the copied text to this note, it was edited and the changes made as needed.  It is accurate to the point, when the note was signed today.     Thank you for involving us in the care of Blane Dietz.  Please feel free to call with any questions.    Andriy Foote MD, LOAN  04/19/25  07:18 EDT    [facility]  [phone]      Part of this note may be an electronic transcription/translation of spoken language to printed text using the Dragon Dictation System.

## 2025-04-19 NOTE — PROGRESS NOTES
[facility]    Name:  Blane Dietz   Age:  89 y.o.  Sex:  male  :  1935  MRN:  [id number]   Visit Number:  [id number]  Admission Date:  2025  Date Of Service:  25  Primary Care Physician:  David Em MD     LOS: 12 days :    Chief Complaint:      Shortness of air, N/V/D     Subjective:    Feels good this morning.  Breathing feels good.  Denies any specific pain.    Hospital Course:    Blane Dietz is an 89-year-old man with past medical history of heart failure reduced ejection fraction, moderate AR, moderate MR, type 2 diabetes, cirrhosis, hypertension, prostate cancer, aortic stenosis status post AVR, HUSSAIN on CPAP, hyperlipidemia, history of diffuse large B-cell lymphoma of kidney, CKD, atrial fibrillation.  Presented to [facility] ED on 2025 with concern for several weeks of various degrees of diarrhea, shortness of air with cough.  Family reports the patient actually has been off of this medication for several weeks.  Denied any chest pain, abdominal pain.  Says that he does feel somewhat short of air.  Denied any fevers or chills.     ED summary: Afebrile, atrial fibrillation with RVR rate as high as 160 resolved with cardioversion, hypotension resolved with fluids and Levophed, vital signs stable on 2 L baseline.  EKG initially atrial fibrillation with RVR rate 161.  After resolution sinus rhythm with first-degree AV block, right bundle branch block.  Troponin monitoring 1, 41, ACS not suspected.  proBNP over 18,000.  Sodium 131, anion gap 13.5, BUN 95, creatinine 2.83, EGFR 20, blood glucose 246, calcium 8.0, magnesium 2.8, Phos 6.4, , ALT 77, bilirubin 1.3.  Lactate 2.7, procalcitonin 0.25, no leukocytosis, hemoglobin 12.2.  Blood cultures.  COVID-19 positive.  CT angio chest bilateral pleural effusions, no acute PE, 4.6 cm diameter dilated ascending aorta.  CT ab/pelvis findings suggesting decompensated cirrhotic portal hypertension,  edematous gallbladder likely secondary to third spacing, thick-walled descending colon and distal rectum indeterminant.  He was provided IV amiodarone, vancomycin and cefepime, etomidate, Xylocaine, 2 L bolus.  Started on Levophed.  ED physician placed central line.    Review of Systems:     All systems were reviewed and negative except as mentioned in subjective, assessment and plan.    Vital Signs:    Temp:  [96.2 °F (35.7 °C)-97.5 °F (36.4 °C)] 96.2 °F (35.7 °C)  Heart Rate:  [48-63] 62  Resp:  [14-16] 14  BP: (108-125)/(53-88) 115/60    Intake and output:    I/O last 3 completed shifts:  In: 180 [P.O.:180]  Out: -   No intake/output data recorded.    Physical Examination:    General Appearance:  Alert and cooperative.  Chronically ill-appearing.  No acute distress.  Generalized weakness noted.   Head:  Atraumatic and normocephalic.   Eyes: Conjunctivae and sclerae normal, no icterus. No pallor. Bruised left eye improving   Throat: No oral lesions, no thrush, oral mucosa moist.   Neck: Supple, trachea midline, no thyromegaly.   Lungs:   Breath sounds heard bilaterally equally.  No wheezing.  Bibasilar rhonchi heard.  Decreased air movement bilaterally.  No Pleural rub or bronchial breathing.  Unlabored.  On supplemental oxygen.   Heart:  Normal S1 and S2, no murmur, no gallop, no rub. No JVD.   Abdomen:   Normal bowel sounds, no masses, no organomegaly. Soft, nontender, nondistended, no rebound tenderness.   Extremities: Supple, no edema, no cyanosis, no clubbing.    Skin: No bleeding.  Superficial chronic leg wound, see photo    Neurologic: Alert and oriented x 3. No facial asymmetry. Moves all four limbs. No tremors.      Laboratory results:    Results from last 7 days   Lab Units 04/18/25  0957 04/17/25  0740 04/16/25  1046   SODIUM mmol/L 136 137 134*   POTASSIUM mmol/L 4.6 4.9 5.0   CHLORIDE mmol/L 102 99 98   CO2 mmol/L 18.9* 21.7* 20.0*   BUN mg/dL 93* 94* 88*   CREATININE mg/dL 2.95* 2.80* 2.61*   CALCIUM  "mg/dL 8.1* 8.3* 7.9*   GLUCOSE mg/dL 197* 136* 144*     Results from last 7 days   Lab Units 04/14/25  0936 04/13/25  0622   WBC 10*3/mm3 5.44 5.13   HEMOGLOBIN g/dL 11.6* 11.5*   HEMATOCRIT % 35.7* 35.8*   PLATELETS 10*3/mm3 69* 71*                     No results for input(s): \"PHART\", \"MRF1VMN\", \"PO2ART\", \"HWZ3UYN\", \"BASEEXCESS\" in the last 8760 hours.   I have reviewed the patient's laboratory results.    Radiology results:    No radiology results from the last 24 hrs    I have reviewed the patient's radiology reports.    Medication Review:     I have reviewed the patient's active and prn medications.     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  Transaminitis  Hyperbilirubinemia  Chronic leg wound  AAA     Chronic: Heart failure reduced ejection fraction, moderate AR, moderate MR, type 2 diabetes, cirrhosis, hypertension, prostate cancer, aortic stenosis status post AVR, HUSSAIN on CPAP, hyperlipidemia, history of diffuse large B-cell lymphoma of kidney, CKD, atrial fibrillation    Plan:    Inpatient ICU admission 4/7/2025 with atrial fibrillation with RVR status post cardioversion in ED, hypotension likely multifactorial requiring Levophed, elevated troponins ACS not suspected likely demand ischemia, COVID-19 infection on his baseline 2 L, acute exacerbation of heart failure reduced ejection fraction, hyponatremia, 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    Patient and family decided for transition to LTC facility with hospice.     Atrial fibrillation with RVR, resolved  -Cardiology consultation, recommendations appreciated.  -Status post cardioversion in the ED.  - Per cardiology commendations, continuing IV amiodarone for a total of 48-72 hours. Then can be changed to " oral amiodarone therapy at 200 mg once per day.   - switched to p.o. amiodarone.    Elevated troponins  -ACS not suspected, likely demand ischemia.    Acute exacerbation of heart failure reduced ejection fraction  Bilateral pleural effusions.   -Previous 2D echo on file from December 2024 with EF of 38%  -Daily weight, strict ins and outs  -Diuretics per nephrology  -Cardiology consulted, appreciate recommendations    Hypotension/shock, improved, resolved  -Shock likely multifactorial given multiple chronic severe conditions, but could be related to sepsis  -Central line placed in ED  -added midodrine   -IV pressors titrated off.  - Transferred out of ICU and central line DC'd on 4/11    Colitis, suspected, resolved  -Ct showed: Thick-walled ascending colon and distal rectum are indeterminate. Correlate with signs or symptoms of colitis.  - Initially started vancomycin and cefepime on admission out of an abundance of caution.  -Blood cultures remains negative x 5 days  -Unsure if hypotension has an element of septic shock, likely multifactorial given multiple chronic severe conditions.  -GI panel and C. Difficile both negative  -MRSA screen negative, discontinue vancomycin, continue Zosyn  -WBC WNL, afebrile  - Finished 5 days of cefepime    COVID-19 infection  Chronic respiratory failure  -On his 2 L baseline.  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  -Avoid nephrotoxic drugs  -Diuretics per nephrology  - I personally discussed with Dr. Foote today.    Transaminitis, improved  Hyperbilirubinemia, improved  Cirrhosis  -Findings on imaging suggesting decompensated cirrhotic portal hypertension. Edematous gallbladder is likely secondary to 3rd spacing of fluid rather than cholecystitis.  -Right upper quadrant ultrasound ordered gallbladder sludge but  no gallstones, shows cirrhosis.  - Abnormal LFTs likely secondary to chronic cirrhosis.  - Monitor liver function  - Held statin    Chronic leg wound  -Wound care.    AAA  -Ascending aorta 4.6 cm diameter. Similar to prior.   -Recommend follow-up.     Chronic: Heart failure reduced ejection fraction, moderate AR, moderate MR, type 2 diabetes, cirrhosis, hypertension, prostate cancer, aortic stenosis status post AVR, HUSSAIN on CPAP, hyperlipidemia, history of diffuse large B-cell lymphoma of kidney, CKD, atrial fibrillation  -Subcutaneous insulin protocol.  -Continue other home medications.     -I have reviewed the copied text and it is accurate as of 4/19/2025     - Patient and family decided for LTC care with hospice.    DVT Prophylaxis: Eliquis  Code Status: DNR/DNI  Diet: Cardiac/renal  Discharge Plan: Pending LTC placement with hospice.    Updated daughter Susana during course.     Brian Joseph Kerley, DO  04/19/25  14:56 EDT    Dictated utilizing Dragon dictation.

## 2025-04-20 LAB
ALBUMIN SERPL-MCNC: 4 G/DL (ref 3.5–5.2)
ANION GAP SERPL CALCULATED.3IONS-SCNC: 11.5 MMOL/L (ref 5–15)
BUN SERPL-MCNC: 100 MG/DL (ref 8–23)
BUN/CREAT SERPL: 35.7 (ref 7–25)
CALCIUM SPEC-SCNC: 8.4 MG/DL (ref 8.6–10.5)
CHLORIDE SERPL-SCNC: 105 MMOL/L (ref 98–107)
CO2 SERPL-SCNC: 23.5 MMOL/L (ref 22–29)
CREAT SERPL-MCNC: 2.8 MG/DL (ref 0.76–1.27)
EGFRCR SERPLBLD CKD-EPI 2021: 21.2 ML/MIN/1.73
GLUCOSE BLDC GLUCOMTR-MCNC: 162 MG/DL (ref 70–130)
GLUCOSE BLDC GLUCOMTR-MCNC: 229 MG/DL (ref 70–130)
GLUCOSE BLDC GLUCOMTR-MCNC: 282 MG/DL (ref 70–130)
GLUCOSE BLDC GLUCOMTR-MCNC: 329 MG/DL (ref 70–130)
GLUCOSE SERPL-MCNC: 128 MG/DL (ref 65–99)
PHOSPHATE SERPL-MCNC: 5.1 MG/DL (ref 2.5–4.5)
POTASSIUM SERPL-SCNC: 4.7 MMOL/L (ref 3.5–5.2)
SODIUM SERPL-SCNC: 140 MMOL/L (ref 136–145)

## 2025-04-20 PROCEDURE — 80069 RENAL FUNCTION PANEL: CPT | Performed by: INTERNAL MEDICINE

## 2025-04-20 PROCEDURE — 82948 REAGENT STRIP/BLOOD GLUCOSE: CPT | Performed by: FAMILY MEDICINE

## 2025-04-20 PROCEDURE — 63710000001 INSULIN LISPRO (HUMAN) PER 5 UNITS: Performed by: FAMILY MEDICINE

## 2025-04-20 PROCEDURE — 82948 REAGENT STRIP/BLOOD GLUCOSE: CPT

## 2025-04-20 PROCEDURE — 99231 SBSQ HOSP IP/OBS SF/LOW 25: CPT | Performed by: STUDENT IN AN ORGANIZED HEALTH CARE EDUCATION/TRAINING PROGRAM

## 2025-04-20 RX ADMIN — Medication 10 ML: at 20:10

## 2025-04-20 RX ADMIN — INSULIN LISPRO 7 UNITS: 100 INJECTION, SOLUTION INTRAVENOUS; SUBCUTANEOUS at 11:56

## 2025-04-20 RX ADMIN — AMIODARONE HYDROCHLORIDE 200 MG: 200 TABLET ORAL at 09:47

## 2025-04-20 RX ADMIN — INSULIN LISPRO 4 UNITS: 100 INJECTION, SOLUTION INTRAVENOUS; SUBCUTANEOUS at 18:29

## 2025-04-20 RX ADMIN — MIDODRINE HYDROCHLORIDE 5 MG: 5 TABLET ORAL at 18:30

## 2025-04-20 RX ADMIN — APIXABAN 2.5 MG: 2.5 TABLET, FILM COATED ORAL at 20:10

## 2025-04-20 RX ADMIN — HYDROXYZINE PAMOATE 50 MG: 25 CAPSULE ORAL at 20:10

## 2025-04-20 RX ADMIN — Medication 10 ML: at 09:47

## 2025-04-20 RX ADMIN — MIDODRINE HYDROCHLORIDE 5 MG: 5 TABLET ORAL at 09:47

## 2025-04-20 RX ADMIN — APIXABAN 2.5 MG: 2.5 TABLET, FILM COATED ORAL at 09:47

## 2025-04-20 RX ADMIN — MIDODRINE HYDROCHLORIDE 5 MG: 5 TABLET ORAL at 11:56

## 2025-04-20 RX ADMIN — INSULIN LISPRO 6 UNITS: 100 INJECTION, SOLUTION INTRAVENOUS; SUBCUTANEOUS at 21:35

## 2025-04-20 NOTE — PROGRESS NOTES
[facility] Progress Note  [facility]. [place]        Patient Name: Blane Dietz  : 1935  MRN: [id number]   LOS: 13 days    Patient Care Team:  David Em MD as PCP - General (Internal Medicine)  Ha Toussaint MD as Consulting Physician (Cardiology)  Shannan Ramon MD as Consulting Physician (Hematology and Oncology)  Xavier Bsoton MD as Consulting Physician (Endocrinology)  Brionna Weinstein DO as Surgeon (General Surgery)  Sophia Ferreira PA as Physician Assistant (Endocrinology)  Abbey Reyez APRN as Nurse Practitioner (Nurse Practitioner)    Chief Complaint:    Chief Complaint   Patient presents with    Shortness of Breath     Primary Care Physician:  David Em MD  Date of admission: 2025    Subjective     Interval History:   Follow-up acute on 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   Patient is awake alert and interactive denies having any chest pain shortness of breath.  He said he feels better.  Still feels gets short of breath very easily with any physical activity.  Patient has made up his mind to go to short-term rehab with hospice.  He appears very comfortable, does not feel that there is anything that is worsening.  Events noted from last 24 hours.  I reviewed the chart and other providers notes, labs and procedures done since my last note.    Review of Systems:   As noted above.    Objective     Vitals:   Temp:  [96.2 °F (35.7 °C)-98.6 °F (37 °C)] 98.5 °F (36.9 °C)  Heart Rate:  [48-62] 62  Resp:  [14] 14  BP: (115-120)/(60-61) 119/61  Flow (L/min) (Oxygen Therapy):  [2] 2    Intake/Output Summary (Last 24 hours) at 2025 0709  Last data filed at 2025 1751  Gross per 24 hour   Intake 160 ml   Output --   Net 160 ml       Physical Exam:    General Appearance: alert, oriented x 3, no acute distress   Skin: warm and dry  HEENT: oral mucosa normal, nonicteric sclera  Neck: supple, no JVD  Lungs:  Decreased breath sounds with occasional basal crackles.    Heart: IRRR,   Abdomen: obese, soft, nontender, non distended and positive bowel sounds.  : no palpable bladder  Extremities: Trace edema, no cyanosis or clubbing  Neuro: normal speech and mental status     Scheduled Meds:     Current Facility-Administered Medications   Medication Dose Route Frequency Provider Last Rate Last Admin    amiodarone (PACERONE) tablet 200 mg  200 mg Oral Q24H Deanne Bowles MD   200 mg at 04/19/25 0802    apixaban (ELIQUIS) tablet 2.5 mg  2.5 mg Oral Q12H Deanne Bowles MD   2.5 mg at 04/19/25 2045    benzocaine (AMERICAINE) 20 % rectal ointment   Rectal BID PRN Deanne Bowles MD   Given at 04/10/25 1607    dextrose (D50W) (25 g/50 mL) IV injection 25 g  25 g Intravenous Q15 Min PRN Deanne Bowles MD        dextrose (GLUTOSE) oral gel 15 g  15 g Oral Q15 Min PRN Deanne Bowles MD        glucagon (GLUCAGEN) injection 1 mg  1 mg Intramuscular Q15 Min PRN Deanne Bowles MD        hydrOXYzine pamoate (VISTARIL) capsule 50 mg  50 mg Oral TID PRN Deanne Bowles MD   50 mg at 04/19/25 2045    Insulin Lispro (humaLOG) injection 2-9 Units  2-9 Units Subcutaneous 4x Daily AC & at Bedtime Deanne Bowles MD   7 Units at 04/19/25 2050    metoprolol succinate XL (TOPROL-XL) 24 hr tablet 25 mg  25 mg Oral Daily Deanne Bowles MD   25 mg at 04/18/25 0846    midodrine (PROAMATINE) tablet 5 mg  5 mg Oral TID AC Deanne Bowles MD   5 mg at 04/19/25 1745    nitroglycerin (NITROSTAT) SL tablet 0.4 mg  0.4 mg Sublingual Q5 Min PRN Deanne Bowles MD        ondansetron (ZOFRAN) injection 4 mg  4 mg Intravenous Q6H PRN Deanne Bowles MD        [Held by provider] pravastatin (PRAVACHOL) tablet 40 mg  40 mg Oral Daily Kerley, Brian Joseph, DO   40 mg at 04/08/25 1039    sodium chloride 0.9 % flush 10 mL  10 mL Intravenous Q12H Deanne Bowles MD   10 mL at 04/19/25 2045    sodium chloride 0.9 % flush 10 mL  10 mL Intravenous PRN  "Deanne Bowles MD   10 mL at 04/13/25 2126    sodium chloride 0.9 % infusion 40 mL  40 mL Intravenous PRN Deanne Bowles MD           amiodarone, 200 mg, Oral, Q24H  apixaban, 2.5 mg, Oral, Q12H  insulin lispro, 2-9 Units, Subcutaneous, 4x Daily AC & at Bedtime  metoprolol succinate XL, 25 mg, Oral, Daily  midodrine, 5 mg, Oral, TID AC  [Held by provider] pravastatin, 40 mg, Oral, Daily  sodium chloride, 10 mL, Intravenous, Q12H        IV Meds:          Results Reviewed:   I have personally reviewed the results from the time of this admission to 4/20/2025 07:09 EDT     Results from last 7 days   Lab Units 04/20/25  0517 04/18/25  0957 04/17/25  0740   SODIUM mmol/L 140 136 137   POTASSIUM mmol/L 4.7 4.6 4.9   CHLORIDE mmol/L 105 102 99   CO2 mmol/L 23.5 18.9* 21.7*   BUN mg/dL 100* 93* 94*   CREATININE mg/dL 2.80* 2.95* 2.80*   CALCIUM mg/dL 8.4* 8.1* 8.3*   GLUCOSE mg/dL 128* 197* 136*       Estimated Creatinine Clearance: 19.3 mL/min (A) (by C-G formula based on SCr of 2.8 mg/dL (H)).    Results from last 7 days   Lab Units 04/20/25  0517 04/18/25  0957 04/17/25  0740   PHOSPHORUS mg/dL 5.1* 4.6* 5.0*               Results from last 7 days   Lab Units 04/14/25  0936   WBC 10*3/mm3 5.44   HEMOGLOBIN g/dL 11.6*   PLATELETS 10*3/mm3 69*             Brief Urine Lab Results  (Last result in the past 365 days)        Color   Clarity   Blood   Leuk Est   Nitrite   Protein   CREAT   Urine HCG        04/08/25 1823 Yellow   Clear   Negative   Small (1+)   Negative   30 mg/dL (1+)                   No results found for: \"UTPCR\"    Imaging Results (Last 24 Hours)       ** No results found for the last 24 hours. **                Assessment / Plan     ASSESSMENT:    COVID-19    Acute kidney injury: Patient does have significant worsening of renal function from his baseline likely hemodynamically mediated as well as cardiorenal syndrome.  Continue to optimize medications.  He does not have any significant electrolyte " abnormalities bicarb is stable as well.  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  Valvular heart disease: Continue to optimize medications.  Coronary artery disease: Not a candidate for any aggressive cardiac workup, cardiac evaluation is in progress.  Congestive heart failure: Will need to optimize his volume status will adjust diuretics as needed.  Atrial fibrillation: Currently on amiodarone with some improvement in atrial fibrillation.  Type 2 diabetes: Continue with the sliding scale  Peripheral vascular disease: Continue to optimize medications no intervention planned.  Obstructive sleep apnea: Stable use of BiPAP.      PLAN:  Labs noted creatinine is slightly better BUN is slightly high, it appears that he is turning around and may be started to have some improvement in BUN as well starting tomorrow.  Details were discussed with the patient no family in the room.  He has decided to go to short-term rehab.  Awaiting placement.  Details were also discussed with the hospitalist service and or other providers as needed.    Continue with rest of the current treatment plan, and monitor with surveillance labs, adjust medication doses to the eGFR.  Further recommendations will depend on clinical course of the patient during the current hospitalization.   I have reviewed the copied text to this note, it was edited and the changes made as needed.  It is accurate to the point, when the note was signed today.     Thank you for involving us in the care of Blane Dietz.  Please feel free to call with any questions.    Andriy Foote MD, FASN  04/20/25  07:09 EDT    [facility]\""  [phone]      Part of this note may be an electronic transcription/translation of spoken language to printed text using the Dragon Dictation System.

## 2025-04-20 NOTE — PLAN OF CARE
Problem: Adult Inpatient Plan of Care  Goal: Plan of Care Review  Outcome: Progressing  Goal: Patient-Specific Goal (Individualized)  Outcome: Progressing  Goal: Absence of Hospital-Acquired Illness or Injury  Outcome: Progressing  Intervention: Identify and Manage Fall Risk  Recent Flowsheet Documentation  Taken 4/20/2025 1600 by Natali Sutton RN  Safety Promotion/Fall Prevention: safety round/check completed  Taken 4/20/2025 1400 by Natali Sutton RN  Safety Promotion/Fall Prevention: safety round/check completed  Taken 4/20/2025 1200 by Natali Sutton RN  Safety Promotion/Fall Prevention: safety round/check completed  Taken 4/20/2025 1000 by Natali Sutton RN  Safety Promotion/Fall Prevention: safety round/check completed  Taken 4/20/2025 0800 by Natali Sutton RN  Safety Promotion/Fall Prevention: safety round/check completed  Intervention: Prevent Skin Injury  Recent Flowsheet Documentation  Taken 4/20/2025 1600 by Natali Sutton RN  Body Position:   sitting up in bed   patient/family refused  Taken 4/20/2025 1400 by Natali Sutton RN  Body Position:   tilted   left   heels elevated  Taken 4/20/2025 1200 by Natali Sutton RN  Body Position:   weight shifting   dangle, side of bed  Taken 4/20/2025 1000 by Natali Sutton RN  Body Position:   tilted   right   heels elevated  Taken 4/20/2025 0800 by Natail Sutton RN  Body Position:   dangle, side of bed   weight shifting  Intervention: Prevent and Manage VTE (Venous Thromboembolism) Risk  Recent Flowsheet Documentation  Taken 4/20/2025 0800 by Natali Sutton RN  VTE Prevention/Management: (See MAR) --  Intervention: Prevent Infection  Recent Flowsheet Documentation  Taken 4/20/2025 1600 by Natali Sutton RN  Infection Prevention: single patient room provided  Taken 4/20/2025 1400 by Natali Sutton RN  Infection Prevention: single patient room provided  Taken 4/20/2025 1200 by Natali Sutton RN  Infection Prevention: single patient  room provided  Taken 4/20/2025 1000 by Natali Sutton RN  Infection Prevention: single patient room provided  Taken 4/20/2025 0800 by Natali Sutton RN  Infection Prevention: single patient room provided  Goal: Optimal Comfort and Wellbeing  Outcome: Progressing  Goal: Readiness for Transition of Care  Outcome: Progressing  Goal: Plan of Care Review  Outcome: Progressing  Goal: Patient-Specific Goal (Individualized)  Outcome: Progressing  Goal: Absence of Hospital-Acquired Illness or Injury  Outcome: Progressing  Intervention: Identify and Manage Fall Risk  Recent Flowsheet Documentation  Taken 4/20/2025 1600 by Natali Sutton RN  Safety Promotion/Fall Prevention: safety round/check completed  Taken 4/20/2025 1400 by Natali Sutton RN  Safety Promotion/Fall Prevention: safety round/check completed  Taken 4/20/2025 1200 by Natali Sutton RN  Safety Promotion/Fall Prevention: safety round/check completed  Taken 4/20/2025 1000 by Natali Sutton RN  Safety Promotion/Fall Prevention: safety round/check completed  Taken 4/20/2025 0800 by Natali Sutton RN  Safety Promotion/Fall Prevention: safety round/check completed  Intervention: Prevent Skin Injury  Recent Flowsheet Documentation  Taken 4/20/2025 1600 by Natali Sutton RN  Body Position:   sitting up in bed   patient/family refused  Taken 4/20/2025 1400 by Natali Sutton RN  Body Position:   tilted   left   heels elevated  Taken 4/20/2025 1200 by Natali Sutton RN  Body Position:   weight shifting   dangle, side of bed  Taken 4/20/2025 1000 by Natali Sutton RN  Body Position:   tilted   right   heels elevated  Taken 4/20/2025 0800 by Natali Sutton RN  Body Position:   dangle, side of bed   weight shifting  Intervention: Prevent and Manage VTE (Venous Thromboembolism) Risk  Recent Flowsheet Documentation  Taken 4/20/2025 0800 by Natali Sutton RN  VTE Prevention/Management: (See MAR) --  Intervention: Prevent Infection  Recent Flowsheet  Documentation  Taken 4/20/2025 1600 by Natali Sutton RN  Infection Prevention: single patient room provided  Taken 4/20/2025 1400 by Natali Sutton RN  Infection Prevention: single patient room provided  Taken 4/20/2025 1200 by Natali Sutton RN  Infection Prevention: single patient room provided  Taken 4/20/2025 1000 by Natali Sutton RN  Infection Prevention: single patient room provided  Taken 4/20/2025 0800 by Natali Sutton RN  Infection Prevention: single patient room provided  Goal: Optimal Comfort and Wellbeing  Outcome: Progressing  Goal: Readiness for Transition of Care  Outcome: Progressing     Problem: Comorbidity Management  Goal: Maintenance of Heart Failure Symptom Control  Outcome: Progressing  Intervention: Maintain Heart Failure Management  Recent Flowsheet Documentation  Taken 4/20/2025 1600 by Natali Sutton RN  Medication Review/Management: medications reviewed  Taken 4/20/2025 1400 by Natali Sutton RN  Medication Review/Management: medications reviewed  Taken 4/20/2025 1200 by Natali Sutton RN  Medication Review/Management: medications reviewed  Taken 4/20/2025 1000 by Natali Sutton RN  Medication Review/Management: medications reviewed  Taken 4/20/2025 0800 by Natali Sutton RN  Medication Review/Management: medications reviewed     Problem: Skin Injury Risk Increased  Goal: Skin Health and Integrity  Outcome: Progressing  Intervention: Optimize Skin Protection  Recent Flowsheet Documentation  Taken 4/20/2025 1600 by Natali Sutton RN  Activity Management: activity encouraged  Taken 4/20/2025 1400 by Natali Sutton RN  Activity Management: activity encouraged  Taken 4/20/2025 1200 by Natali Sutton RN  Activity Management: activity encouraged  Taken 4/20/2025 1000 by Natali Sutton RN  Activity Management: activity encouraged  Taken 4/20/2025 0800 by Natali Sutton RN  Activity Management: activity encouraged     Problem: Heart Failure  Goal: Stable Heart Rate  and Rhythm  Outcome: Progressing  Goal: Fluid and Electrolyte Balance  Outcome: Progressing  Goal: Effective Oxygenation and Ventilation  Outcome: Progressing  Intervention: Promote Airway Secretion Clearance  Recent Flowsheet Documentation  Taken 4/20/2025 1600 by Natali Sutton RN  Activity Management: activity encouraged  Taken 4/20/2025 1400 by Natali Sutton RN  Activity Management: activity encouraged  Taken 4/20/2025 1200 by Natali Sutton RN  Activity Management: activity encouraged  Taken 4/20/2025 1000 by Natali Sutton RN  Activity Management: activity encouraged  Taken 4/20/2025 0800 by Natali Sutton RN  Activity Management: activity encouraged  Goal: Effective Breathing Pattern During Sleep  Outcome: Progressing  Intervention: Monitor and Manage Obstructive Sleep Apnea  Recent Flowsheet Documentation  Taken 4/20/2025 1600 by Natali Sutton RN  Medication Review/Management: medications reviewed  Taken 4/20/2025 1400 by Natali Sutton RN  Medication Review/Management: medications reviewed  Taken 4/20/2025 1200 by Natali Sutton RN  Medication Review/Management: medications reviewed  Taken 4/20/2025 1000 by Natali Sutton RN  Medication Review/Management: medications reviewed  Taken 4/20/2025 0800 by Natali Sutton RN  Medication Review/Management: medications reviewed     Problem: Fall Injury Risk  Goal: Absence of Fall and Fall-Related Injury  Outcome: Progressing  Intervention: Identify and Manage Contributors  Recent Flowsheet Documentation  Taken 4/20/2025 1600 by Natali Sutton RN  Medication Review/Management: medications reviewed  Taken 4/20/2025 1400 by Natali Sutton RN  Medication Review/Management: medications reviewed  Taken 4/20/2025 1200 by Natali Sutton RN  Medication Review/Management: medications reviewed  Taken 4/20/2025 1000 by Natali Sutton RN  Medication Review/Management: medications reviewed  Taken 4/20/2025 0800 by Natali Sutton RN  Medication  Review/Management: medications reviewed  Intervention: Promote Injury-Free Environment  Recent Flowsheet Documentation  Taken 4/20/2025 1600 by Natali Sutton RN  Safety Promotion/Fall Prevention: safety round/check completed  Taken 4/20/2025 1400 by Natali Sutton RN  Safety Promotion/Fall Prevention: safety round/check completed  Taken 4/20/2025 1200 by Natali Sutton RN  Safety Promotion/Fall Prevention: safety round/check completed  Taken 4/20/2025 1000 by Natali Sutton RN  Safety Promotion/Fall Prevention: safety round/check completed  Taken 4/20/2025 0800 by Natali Sutton RN  Safety Promotion/Fall Prevention: safety round/check completed  Goal: Absence of Fall and Fall-Related Injury  Outcome: Progressing  Intervention: Identify and Manage Contributors  Recent Flowsheet Documentation  Taken 4/20/2025 1600 by Natali Sutton RN  Medication Review/Management: medications reviewed  Taken 4/20/2025 1400 by Natali Sutton RN  Medication Review/Management: medications reviewed  Taken 4/20/2025 1200 by Natali Sutton RN  Medication Review/Management: medications reviewed  Taken 4/20/2025 1000 by Natali Sutton RN  Medication Review/Management: medications reviewed  Taken 4/20/2025 0800 by Natali Sutton RN  Medication Review/Management: medications reviewed  Intervention: Promote Injury-Free Environment  Recent Flowsheet Documentation  Taken 4/20/2025 1600 by Natali Sutton RN  Safety Promotion/Fall Prevention: safety round/check completed  Taken 4/20/2025 1400 by Natali Sutton RN  Safety Promotion/Fall Prevention: safety round/check completed  Taken 4/20/2025 1200 by Natali Sutton RN  Safety Promotion/Fall Prevention: safety round/check completed  Taken 4/20/2025 1000 by Natali Sutton RN  Safety Promotion/Fall Prevention: safety round/check completed  Taken 4/20/2025 0800 by Natali Sutton RN  Safety Promotion/Fall Prevention: safety round/check completed     Problem: Gas Exchange  Impaired  Goal: Optimal Gas Exchange  Outcome: Progressing   Goal Outcome Evaluation:

## 2025-04-20 NOTE — PLAN OF CARE
Goal Outcome Evaluation:              Outcome Evaluation: VSS. Patient not sleeping much. PRN Visteral given for anxiety. Patient frequently seeking staff. Possible discharge when bed is available at [facility]. Care continues.

## 2025-04-20 NOTE — PROGRESS NOTES
[facility]    Name:  Blane Dietz   Age:  89 y.o.  Sex:  male  :  1935  MRN:  [id number]   Visit Number:  [id number]  Admission Date:  2025  Date Of Service:  25  Primary Care Physician:  David Em MD     LOS: 13 days :    Chief Complaint:      Shortness of air, N/V/D     Subjective:    Feels good this morning.  Breathing feels good.  Denies any specific pain.    Hospital Course:    Blane Dietz is an 89-year-old man with past medical history of heart failure reduced ejection fraction, moderate AR, moderate MR, type 2 diabetes, cirrhosis, hypertension, prostate cancer, aortic stenosis status post AVR, HUSSAIN on CPAP, hyperlipidemia, history of diffuse large B-cell lymphoma of kidney, CKD, atrial fibrillation.  Presented to [facility] ED on 2025 with concern for several weeks of various degrees of diarrhea, shortness of air with cough.  Family reports the patient actually has been off of this medication for several weeks.  Denied any chest pain, abdominal pain.  Says that he does feel somewhat short of air.  Denied any fevers or chills.     ED summary: Afebrile, atrial fibrillation with RVR rate as high as 160 resolved with cardioversion, hypotension resolved with fluids and Levophed, vital signs stable on 2 L baseline.  EKG initially atrial fibrillation with RVR rate 161.  After resolution sinus rhythm with first-degree AV block, right bundle branch block.  Troponin monitoring 1, 41, ACS not suspected.  proBNP over 18,000.  Sodium 131, anion gap 13.5, BUN 95, creatinine 2.83, EGFR 20, blood glucose 246, calcium 8.0, magnesium 2.8, Phos 6.4, , ALT 77, bilirubin 1.3.  Lactate 2.7, procalcitonin 0.25, no leukocytosis, hemoglobin 12.2.  Blood cultures.  COVID-19 positive.  CT angio chest bilateral pleural effusions, no acute PE, 4.6 cm diameter dilated ascending aorta.  CT ab/pelvis findings suggesting decompensated cirrhotic portal hypertension,  edematous gallbladder likely secondary to third spacing, thick-walled descending colon and distal rectum indeterminant.  He was provided IV amiodarone, vancomycin and cefepime, etomidate, Xylocaine, 2 L bolus.  Started on Levophed.  ED physician placed central line.    Review of Systems:     All systems were reviewed and negative except as mentioned in subjective, assessment and plan.    Vital Signs:    Temp:  [96.6 °F (35.9 °C)-98.6 °F (37 °C)] 97.6 °F (36.4 °C)  Heart Rate:  [49-62] 55  Resp:  [14-22] 20  BP: (119-129)/(60-78) 125/68    Intake and output:    I/O last 3 completed shifts:  In: 160 [P.O.:160]  Out: -   I/O this shift:  In: 360 [P.O.:360]  Out: -     Physical Examination:    General Appearance:  Alert and cooperative.  Chronically ill-appearing.  No acute distress.  Generalized weakness noted.   Head:  Atraumatic and normocephalic.   Eyes: Conjunctivae and sclerae normal, no icterus. No pallor. Bruised left eye improving   Throat: No oral lesions, no thrush, oral mucosa moist.   Neck: Supple, trachea midline, no thyromegaly.   Lungs:   Breath sounds heard bilaterally equally.  No wheezing.  Bibasilar rhonchi heard.  Decreased air movement bilaterally.  No Pleural rub or bronchial breathing.  Unlabored.  On supplemental oxygen.   Heart:  Normal S1 and S2, no murmur, no gallop, no rub. No JVD.   Abdomen:   Normal bowel sounds, no masses, no organomegaly. Soft, nontender, nondistended, no rebound tenderness.   Extremities: Supple, no edema, no cyanosis, no clubbing.    Skin: No bleeding.  Superficial chronic leg wound, see photo    Neurologic: Alert and oriented x 3. No facial asymmetry. Moves all four limbs. No tremors.      Laboratory results:    Results from last 7 days   Lab Units 04/20/25  0517 04/18/25  0957 04/17/25  0740   SODIUM mmol/L 140 136 137   POTASSIUM mmol/L 4.7 4.6 4.9   CHLORIDE mmol/L 105 102 99   CO2 mmol/L 23.5 18.9* 21.7*   BUN mg/dL 100* 93* 94*   CREATININE mg/dL 2.80* 2.95*  "2.80*   CALCIUM mg/dL 8.4* 8.1* 8.3*   GLUCOSE mg/dL 128* 197* 136*     Results from last 7 days   Lab Units 04/14/25  0936   WBC 10*3/mm3 5.44   HEMOGLOBIN g/dL 11.6*   HEMATOCRIT % 35.7*   PLATELETS 10*3/mm3 69*                     No results for input(s): \"PHART\", \"GTJ3LFF\", \"PO2ART\", \"WTV4SKC\", \"BASEEXCESS\" in the last 8760 hours.   I have reviewed the patient's laboratory results.    Radiology results:    No radiology results from the last 24 hrs    I have reviewed the patient's radiology reports.    Medication Review:     I have reviewed the patient's active and prn medications.     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  Transaminitis  Hyperbilirubinemia  Chronic leg wound  AAA     Chronic: Heart failure reduced ejection fraction, moderate AR, moderate MR, type 2 diabetes, cirrhosis, hypertension, prostate cancer, aortic stenosis status post AVR, HUSSAIN on CPAP, hyperlipidemia, history of diffuse large B-cell lymphoma of kidney, CKD, atrial fibrillation    Plan:    Inpatient ICU admission 4/7/2025 with atrial fibrillation with RVR status post cardioversion in ED, hypotension likely multifactorial requiring Levophed, elevated troponins ACS not suspected likely demand ischemia, COVID-19 infection on his baseline 2 L, acute exacerbation of heart failure reduced ejection fraction, hyponatremia, 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    Patient and family decided for transition to LTC facility with hospice.     Atrial fibrillation with RVR, resolved  -Cardiology consultation, recommendations appreciated.  -Status post cardioversion in the ED.  - Per cardiology commendations, continuing IV amiodarone for a total of 48-72 hours. Then can be changed to oral amiodarone " therapy at 200 mg once per day.   - switched to p.o. amiodarone.    Elevated troponins  -ACS not suspected, likely demand ischemia.    Acute exacerbation of heart failure reduced ejection fraction  Bilateral pleural effusions.   -Previous 2D echo on file from December 2024 with EF of 38%  -Daily weight, strict ins and outs  -Diuretics per nephrology  -Cardiology consulted, appreciate recommendations    Hypotension/shock, improved, resolved  -Shock likely multifactorial given multiple chronic severe conditions, but could be related to sepsis  -Central line placed in ED  -added midodrine   -IV pressors titrated off.  - Transferred out of ICU and central line DC'd on 4/11    Colitis, suspected, resolved  -Ct showed: Thick-walled ascending colon and distal rectum are indeterminate. Correlate with signs or symptoms of colitis.  - Initially started vancomycin and cefepime on admission out of an abundance of caution.  -Blood cultures remains negative x 5 days  -Unsure if hypotension has an element of septic shock, likely multifactorial given multiple chronic severe conditions.  -GI panel and C. Difficile both negative  -MRSA screen negative, discontinue vancomycin, continue Zosyn  -WBC WNL, afebrile  - Finished 5 days of cefepime    COVID-19 infection  Chronic respiratory failure  -On his 2 L baseline.  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  -Avoid nephrotoxic drugs  -Diuretics per nephrology  - I personally discussed with Dr. Foote today.    Transaminitis, improved  Hyperbilirubinemia, improved  Cirrhosis  -Findings on imaging suggesting decompensated cirrhotic portal hypertension. Edematous gallbladder is likely secondary to 3rd spacing of fluid rather than cholecystitis.  -Right upper quadrant ultrasound ordered gallbladder sludge but no gallstones,  shows cirrhosis.  - Abnormal LFTs likely secondary to chronic cirrhosis.  - Monitor liver function  - Held statin    Chronic leg wound  -Wound care.    AAA  -Ascending aorta 4.6 cm diameter. Similar to prior.   -Recommend follow-up.     Chronic: Heart failure reduced ejection fraction, moderate AR, moderate MR, type 2 diabetes, cirrhosis, hypertension, prostate cancer, aortic stenosis status post AVR, HUSSAIN on CPAP, hyperlipidemia, history of diffuse large B-cell lymphoma of kidney, CKD, atrial fibrillation  -Subcutaneous insulin protocol.  -Continue other home medications.     -I have reviewed the copied text and it is accurate as of 4/20/2025     - Patient and family decided for LTC care with hospice.    DVT Prophylaxis: Eliquis  Code Status: DNR/DNI  Diet: Cardiac/renal  Discharge Plan: Pending LTC placement with hospice.    Updated daughter Susana during course.     Brian Joseph Kerley, DO  04/20/25  14:01 EDT    Dictated utilizing Dragon dictation.

## 2025-04-21 LAB
GLUCOSE BLDC GLUCOMTR-MCNC: 149 MG/DL (ref 70–130)
GLUCOSE BLDC GLUCOMTR-MCNC: 194 MG/DL (ref 70–130)
GLUCOSE BLDC GLUCOMTR-MCNC: 222 MG/DL (ref 70–130)
GLUCOSE BLDC GLUCOMTR-MCNC: 282 MG/DL (ref 70–130)

## 2025-04-21 PROCEDURE — 99231 SBSQ HOSP IP/OBS SF/LOW 25: CPT | Performed by: PHYSICIAN ASSISTANT

## 2025-04-21 PROCEDURE — 99231 SBSQ HOSP IP/OBS SF/LOW 25: CPT | Performed by: STUDENT IN AN ORGANIZED HEALTH CARE EDUCATION/TRAINING PROGRAM

## 2025-04-21 PROCEDURE — 82948 REAGENT STRIP/BLOOD GLUCOSE: CPT

## 2025-04-21 PROCEDURE — 63710000001 INSULIN LISPRO (HUMAN) PER 5 UNITS: Performed by: FAMILY MEDICINE

## 2025-04-21 PROCEDURE — 82948 REAGENT STRIP/BLOOD GLUCOSE: CPT | Performed by: FAMILY MEDICINE

## 2025-04-21 RX ADMIN — HYDROXYZINE PAMOATE 50 MG: 25 CAPSULE ORAL at 21:47

## 2025-04-21 RX ADMIN — MIDODRINE HYDROCHLORIDE 5 MG: 5 TABLET ORAL at 17:14

## 2025-04-21 RX ADMIN — INSULIN LISPRO 6 UNITS: 100 INJECTION, SOLUTION INTRAVENOUS; SUBCUTANEOUS at 21:47

## 2025-04-21 RX ADMIN — Medication 10 ML: at 08:19

## 2025-04-21 RX ADMIN — APIXABAN 2.5 MG: 2.5 TABLET, FILM COATED ORAL at 21:47

## 2025-04-21 RX ADMIN — Medication 10 ML: at 21:47

## 2025-04-21 RX ADMIN — INSULIN LISPRO 2 UNITS: 100 INJECTION, SOLUTION INTRAVENOUS; SUBCUTANEOUS at 12:14

## 2025-04-21 RX ADMIN — AMIODARONE HYDROCHLORIDE 200 MG: 200 TABLET ORAL at 08:19

## 2025-04-21 RX ADMIN — METOPROLOL SUCCINATE 25 MG: 25 TABLET, EXTENDED RELEASE ORAL at 08:18

## 2025-04-21 RX ADMIN — MIDODRINE HYDROCHLORIDE 5 MG: 5 TABLET ORAL at 12:14

## 2025-04-21 RX ADMIN — MIDODRINE HYDROCHLORIDE 5 MG: 5 TABLET ORAL at 08:19

## 2025-04-21 RX ADMIN — INSULIN LISPRO 4 UNITS: 100 INJECTION, SOLUTION INTRAVENOUS; SUBCUTANEOUS at 17:14

## 2025-04-21 RX ADMIN — APIXABAN 2.5 MG: 2.5 TABLET, FILM COATED ORAL at 08:19

## 2025-04-21 NOTE — PROGRESS NOTES
"    [facility]     PALLIATIVE CARE FOLLOW UP NOTE    Name:  Blane Dietz   Age:  89 y.o.  Sex:  male  :  1935  MRN:  [id number]   Visit Number:  [id number]  Date Of Service:  25  Primary Care Physician:  David Em MD    Chief Complaint: Generalized weakness    Interval History:  Patient seen today during palliative care team rounds.  Record reviewed and case discussed with staff, MARY.  He is sitting upright at bedside, eating his lunch.  He appears in good spirits, discussing his interests including history.  He is looking forward to transitioning to a facility, has not been sleeping well in the hospital.  CM following regarding LTC placement with Hospice referral.          Review of Systems   Constitutional:  Positive for activity change and fatigue.   Respiratory:  Positive for shortness of breath (with exertion).    Neurological:  Positive for weakness.          Pain Assessment  Nonverbal Indicators of Pain: nonverbal indicators absent  Pain Location: rectal  Pain Description: intermittent, sharp  Vitals: /72 (BP Location: Left arm, Patient Position: Sitting)   Pulse 67   Temp 97.8 °F (36.6 °C) (Temporal)   Resp 20   Ht 167.6 cm (66\")   Wt 88.2 kg (194 lb 7.1 oz)   SpO2 97%   BMI 31.38 kg/m²     Physical Exam  Vitals and nursing note reviewed.   Constitutional:       General: He is not in acute distress.     Appearance: He is not diaphoretic.      Comments: Elderly male sitting upright at bedside, NAD   HENT:      Head: Normocephalic and atraumatic.      Mouth/Throat:      Mouth: Mucous membranes are moist.      Pharynx: Oropharynx is clear.   Eyes:      Conjunctiva/sclera: Conjunctivae normal.      Pupils: Pupils are equal, round, and reactive to light.   Cardiovascular:      Comments: Appears well perfused   Pulmonary:      Effort: Pulmonary effort is normal. No respiratory distress.   Abdominal:      General: There is no distension.      Palpations: Abdomen is " soft.      Tenderness: There is no abdominal tenderness.   Musculoskeletal:         General: No swelling. Normal range of motion.      Cervical back: Normal range of motion and neck supple.   Skin:     General: Skin is warm and dry.      Capillary Refill: Capillary refill takes less than 2 seconds.   Neurological:      Mental Status: He is alert and oriented to person, place, and time.   Psychiatric:         Mood and Affect: Mood normal.         Behavior: Behavior normal.          Results Reviewed:    Intake/Output Summary (Last 24 hours) at 4/21/2025 1427  Last data filed at 4/21/2025 0800  Gross per 24 hour   Intake 580 ml   Output --   Net 580 ml     Results from last 7 days   Lab Units 04/20/25  0517   SODIUM mmol/L 140   POTASSIUM mmol/L 4.7   CHLORIDE mmol/L 105   CO2 mmol/L 23.5   BUN mg/dL 100*   CREATININE mg/dL 2.80*   CALCIUM mg/dL 8.4*   GLUCOSE mg/dL 128*             Medication Review:   I have reviewed the patients active and prn medications.     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  Cirrhosis  Debility    Recommendations/Plan:  - GOC discussions yield desire to proceed with hospice services upon discharge, interested in transitioning to LTC facility, CM following  - Patient has previously expressed no desire for further escalation of care, in the event he has acute decompensation, likely would desire transition to comfort measures   - Patient likely qualifies for hospice under general criteria given his advanced age, multiple comorbid conditions, and progressive functional decline, and poor nutrition  - Life limiting condition  - Treatment goals are for comfort rather than cure  - Documented terminal disease   - Decline in nutritional status   - Clinical progression of disease  - Repeated inpatient admission/ED evaluation  - Functional status decline  - KPS/PPS <70  -  Consider Trazodone for sleep, he defers use of melatonin  - Palliative care will continue to follow/support patient and family        CODE STATUS:   Code Status and Medical Interventions: No CPR (Do Not Attempt to Resuscitate); Limited Support; No intubation (DNI)   Ordered at: 04/08/25 0054     Code Status (Patient has no pulse and is not breathing):    No CPR (Do Not Attempt to Resuscitate)     Medical Interventions (Patient has pulse or is breathing):    Limited Support     Medical Intervention Limits:    No intubation (DNI)         I spent 25 total minutes, including face to face assessment, record review, coordination of care with staff, and counseling patient and/or family  Part of this note may be an electronic transcription/translation of spoken language to printed text using the Dragon Dictation System.    Jimena Silvestre PA-C  04/21/25  14:27 EDT

## 2025-04-21 NOTE — PLAN OF CARE
Goal Outcome Evaluation:              VSS, Patient given Vistaril at HS for anxiety and rest. Patient compliant with cpap use last night. Awaiting placement for [facility] for STR with hospice. No concerns voiced per patient at this time. Care continues.

## 2025-04-21 NOTE — PROGRESS NOTES
"Dietitian Follow-up    Patient Name: Blane Dietz  YOB: 1935  MRN: [id number]  Admission date: 4/7/2025    Comment:      Clinical Nutrition Follow-up   Encounter Information        Trending Narrative     4/21: PO intake averaging 50% x 3 meals.     4/16: Average PO intake 50% x 6 meals. Pt does have elevated glucose level. Will hold off on adding CCD restriction d/t decent PO intake, comorbidities, and plans to d/c with LTC hospice.    4/14: Pt screened for LOS. Average PO intake 66% x 6 meals. No recent wt loss. Will f/up to ensure PO intake continues to improve.      Anthropometrics        Current Height, Weight Height: 167.6 cm (66\")  Weight: 88.2 kg (194 lb 7.1 oz) (04/15/25 0600)       Trending Weight Hx     This admission:              PTA:     Wt Readings from Last 30 Encounters:   04/15/25 0600 88.2 kg (194 lb 7.1 oz)   04/11/25 0330 91.2 kg (201 lb 1 oz)   04/10/25 0400 90.3 kg (199 lb 1.2 oz)   04/09/25 0400 87.9 kg (193 lb 12.6 oz)   04/08/25 0043 87 kg (191 lb 12.8 oz)   04/07/25 1732 90.7 kg (200 lb)   02/06/25 0600 90.8 kg (200 lb 2.8 oz)   02/04/25 0500 90.2 kg (198 lb 13.7 oz)   02/03/25 1300 90 kg (198 lb 6.6 oz)   02/03/25 0809 87.1 kg (192 lb)   01/23/25 1448 87.5 kg (193 lb)   01/16/25 1655 87 kg (191 lb 12.8 oz)   12/22/24 0857 87 kg (191 lb 12.8 oz)   12/22/24 0410 87 kg (191 lb 12.8 oz)   12/21/24 2233 87.8 kg (193 lb 9 oz)   11/13/24 1335 90.3 kg (199 lb)   10/16/24 1427 90.3 kg (199 lb)   10/04/24 1506 83.9 kg (185 lb)   09/18/24 1414 89.9 kg (198 lb 4.8 oz)   08/14/24 1352 91.6 kg (201 lb 14.4 oz)   08/01/24 1311 89.4 kg (197 lb)   07/25/24 1518 88.9 kg (196 lb)   07/17/24 1045 91.8 kg (202 lb 4.8 oz)   06/19/24 1439 91 kg (200 lb 9.6 oz)   05/22/24 1402 90.4 kg (199 lb 3.2 oz)   04/29/24 1305 88.5 kg (195 lb 3.2 oz)   04/24/24 1333 90.7 kg (200 lb)   04/18/24 1312 90.7 kg (200 lb)   04/04/24 1352 89.4 kg (197 lb)   02/21/24 1412 88.5 kg (195 lb)   01/10/24 1355 88 kg (194 " lb)   12/15/23 1430 83.9 kg (185 lb)   12/13/23 1435 83.9 kg (185 lb)   11/29/23 1336 86.2 kg (190 lb)   11/28/23 1810 86.2 kg (190 lb)   11/27/23 1923 86.2 kg (190 lb)   11/25/23 1421 86.2 kg (190 lb)   11/22/23 1906 86.2 kg (190 lb)   11/01/23 1510 86.2 kg (190 lb)   09/29/23 1305 90.2 kg (198 lb 12.8 oz)      BMI kg/m2 Body mass index is 31.38 kg/m².     Labs        Pertinent Labs Results from last 7 days   Lab Units 04/20/25  0517 04/18/25  0957 04/17/25  0740   SODIUM mmol/L 140 136 137   POTASSIUM mmol/L 4.7 4.6 4.9   CHLORIDE mmol/L 105 102 99   CO2 mmol/L 23.5 18.9* 21.7*   BUN mg/dL 100* 93* 94*   CREATININE mg/dL 2.80* 2.95* 2.80*   CALCIUM mg/dL 8.4* 8.1* 8.3*   GLUCOSE mg/dL 128* 197* 136*     Results from last 7 days   Lab Units 04/20/25  0517   PHOSPHORUS mg/dL 5.1*         Medications    Scheduled Medications amiodarone, 200 mg, Oral, Q24H  apixaban, 2.5 mg, Oral, Q12H  insulin lispro, 2-9 Units, Subcutaneous, 4x Daily AC & at Bedtime  metoprolol succinate XL, 25 mg, Oral, Daily  midodrine, 5 mg, Oral, TID AC  [Held by provider] pravastatin, 40 mg, Oral, Daily  sodium chloride, 10 mL, Intravenous, Q12H        Infusions      PRN Medications   benzocaine    dextrose    dextrose    glucagon (human recombinant)    hydrOXYzine pamoate    nitroglycerin    ondansetron    sodium chloride    sodium chloride     Physical Findings        Trending Physical   Appearance, NFPE    --  Edema  1+ (trace), 2+ (mild)   Bowel Function None   Tubes Peripheral IV   Chewing/Swallowing WNL   Skin Intact and Other: wounds noted   --  Current Nutrition Orders & Evaluation of Intake       Oral Nutrition     Food Allergies NKFA   Current PO Diet Diet: Cardiac, Renal; Low Sodium (2g); Low Potassium, Low Sodium (2-3g), Low Phosphorus; Fluid Consistency: Thin (IDDSI 0)   Supplement No active supplement orders     PO Evaluation     Trending % PO Intake 4/21: 50% x 3 meals   4/16: 50% x 6 meals  4/14: 66% x 6 meals     Nutrition  Diagnosis         Nutrition Dx Problem 1 Predicted suboptimal intake r/t clinical condition AEB average PO intake 50% x 6 meals.       Nutrition Dx Problem 2        Intervention Goal         Intervention Goal(s) No significant wt loss  PO intake meet >50% of estimated needs     Nutrition Intervention        RD Action Encourage intake     Nutrition Prescription          Diet Prescription Diet: Cardiac, Renal; Low Sodium (2g); Low Potassium, Low Sodium (2-3g), Low Phosphorus; Fluid Consistency: Thin (IDDSI 0)   Supplement Prescription No active supplement orders     Enteral Nutrition Prescription     TPN Prescription       Monitor/Evaluation        Monitor Per protocol, I&O, PO intake, Pertinent labs, Weight, Skin status, GI status, Symptoms, POC/GOC     RD to f/up    Electronically signed by:  Antoni Huynh RD  04/21/25 12:07 EDT

## 2025-04-21 NOTE — PROGRESS NOTES
[facility]    [facility]    Name:  Blane Dietz   Age:  89 y.o.  Sex:  male  :  1935  MRN:  [id number]   Visit Number:  [id number]  Admission Date:  2025  Date Of Service:  25  Primary Care Physician:  David Em MD     LOS: 14 days :    Chief Complaint:      Shortness of air, N/V/D     Subjective:    Feels good this morning.  Breathing feels good.  Denies any specific pain.    Hospital Course:    Blane Dietz is an 89-year-old man with past medical history of heart failure reduced ejection fraction, moderate AR, moderate MR, type 2 diabetes, cirrhosis, hypertension, prostate cancer, aortic stenosis status post AVR, HUSSAIN on CPAP, hyperlipidemia, history of diffuse large B-cell lymphoma of kidney, CKD, atrial fibrillation.  Presented to [facility] ED on 2025 with concern for several weeks of various degrees of diarrhea, shortness of air with cough.  Family reports the patient actually has been off of this medication for several weeks.  Denied any chest pain, abdominal pain.  Says that he does feel somewhat short of air.  Denied any fevers or chills.     ED summary: Afebrile, atrial fibrillation with RVR rate as high as 160 resolved with cardioversion, hypotension resolved with fluids and Levophed, vital signs stable on 2 L baseline.  EKG initially atrial fibrillation with RVR rate 161.  After resolution sinus rhythm with first-degree AV block, right bundle branch block.  Troponin monitoring 1, 41, ACS not suspected.  proBNP over 18,000.  Sodium 131, anion gap 13.5, BUN 95, creatinine 2.83, EGFR 20, blood glucose 246, calcium 8.0, magnesium 2.8, Phos 6.4, , ALT 77, bilirubin 1.3.  Lactate 2.7, procalcitonin 0.25, no leukocytosis, hemoglobin 12.2.  Blood cultures.  COVID-19 positive.  CT angio chest bilateral pleural effusions, no acute PE, 4.6 cm diameter dilated ascending aorta.  CT ab/pelvis findings suggesting decompensated cirrhotic portal hypertension,  edematous gallbladder likely secondary to third spacing, thick-walled descending colon and distal rectum indeterminant.  He was provided IV amiodarone, vancomycin and cefepime, etomidate, Xylocaine, 2 L bolus.  Started on Levophed.  ED physician placed central line.    Review of Systems:     All systems were reviewed and negative except as mentioned in subjective, assessment and plan.    Vital Signs:    Temp:  [97.4 °F (36.3 °C)-97.8 °F (36.6 °C)] 97.5 °F (36.4 °C)  Heart Rate:  [55-67] 55  Resp:  [18-20] 20  BP: (121-142)/(61-72) 121/63    Intake and output:    I/O last 3 completed shifts:  In: 800 [P.O.:800]  Out: -   I/O this shift:  In: 600 [P.O.:600]  Out: -     Physical Examination:    General Appearance:  Alert and cooperative.  Chronically ill-appearing.  No acute distress.  Generalized weakness noted.   Head:  Atraumatic and normocephalic.   Eyes: Conjunctivae and sclerae normal, no icterus. No pallor. Bruised left eye improving   Throat: No oral lesions, no thrush, oral mucosa moist.   Neck: Supple, trachea midline, no thyromegaly.   Lungs:   Breath sounds heard bilaterally equally.  No wheezing.  Bibasilar rhonchi heard.  Decreased air movement bilaterally.  No Pleural rub or bronchial breathing.  Unlabored.  On supplemental oxygen.   Heart:  Normal S1 and S2, no murmur, no gallop, no rub. No JVD.   Abdomen:   Normal bowel sounds, no masses, no organomegaly. Soft, nontender, nondistended, no rebound tenderness.   Extremities: Supple, no edema, no cyanosis, no clubbing.    Skin: No bleeding.  Superficial chronic leg wound, see photo    Neurologic: Alert and oriented x 3. No facial asymmetry. Moves all four limbs. No tremors.      Laboratory results:    Results from last 7 days   Lab Units 04/20/25  0517 04/18/25  0957 04/17/25  0740   SODIUM mmol/L 140 136 137   POTASSIUM mmol/L 4.7 4.6 4.9   CHLORIDE mmol/L 105 102 99   CO2 mmol/L 23.5 18.9* 21.7*   BUN mg/dL 100* 93* 94*   CREATININE mg/dL 2.80* 2.95*  "2.80*   CALCIUM mg/dL 8.4* 8.1* 8.3*   GLUCOSE mg/dL 128* 197* 136*                           No results for input(s): \"PHART\", \"ZZD9OTP\", \"PO2ART\", \"OZG3BGY\", \"BASEEXCESS\" in the last 8760 hours.   I have reviewed the patient's laboratory results.    Radiology results:    No radiology results from the last 24 hrs    I have reviewed the patient's radiology reports.    Medication Review:     I have reviewed the patient's active and prn medications.     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  Transaminitis  Hyperbilirubinemia  Chronic leg wound  AAA     Chronic: Heart failure reduced ejection fraction, moderate AR, moderate MR, type 2 diabetes, cirrhosis, hypertension, prostate cancer, aortic stenosis status post AVR, HUSSAIN on CPAP, hyperlipidemia, history of diffuse large B-cell lymphoma of kidney, CKD, atrial fibrillation    Plan:    Inpatient ICU admission 4/7/2025 with atrial fibrillation with RVR status post cardioversion in ED, hypotension likely multifactorial requiring Levophed, elevated troponins ACS not suspected likely demand ischemia, COVID-19 infection on his baseline 2 L, acute exacerbation of heart failure reduced ejection fraction, hyponatremia, 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    Patient and family decided for transition to LTC facility with hospice.     Atrial fibrillation with RVR, resolved  -Cardiology consultation, recommendations appreciated.  -Status post cardioversion in the ED.  - Per cardiology commendations, continuing IV amiodarone for a total of 48-72 hours. Then can be changed to oral amiodarone therapy at 200 mg once per day.   - switched to p.o. amiodarone.    Elevated troponins  -ACS not suspected, likely demand ischemia.    Acute " exacerbation of heart failure reduced ejection fraction  Bilateral pleural effusions.   -Previous 2D echo on file from December 2024 with EF of 38%  -Daily weight, strict ins and outs  -Diuretics per nephrology  -Cardiology consulted, appreciate recommendations    Hypotension/shock, improved, resolved  -Shock likely multifactorial given multiple chronic severe conditions, but could be related to sepsis  -Central line placed in ED  -added midodrine   -IV pressors titrated off.  - Transferred out of ICU and central line DC'd on 4/11    Colitis, suspected, resolved  -Ct showed: Thick-walled ascending colon and distal rectum are indeterminate. Correlate with signs or symptoms of colitis.  - Initially started vancomycin and cefepime on admission out of an abundance of caution.  -Blood cultures remains negative x 5 days  -Unsure if hypotension has an element of septic shock, likely multifactorial given multiple chronic severe conditions.  -GI panel and C. Difficile both negative  -MRSA screen negative, discontinue vancomycin, continue Zosyn  -WBC WNL, afebrile  - Finished 5 days of cefepime    COVID-19 infection  Chronic respiratory failure  -On his 2 L baseline.  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  -Avoid nephrotoxic drugs  -Diuretics per nephrology  - I personally discussed with Dr. Foote today.    Transaminitis, improved  Hyperbilirubinemia, improved  Cirrhosis  -Findings on imaging suggesting decompensated cirrhotic portal hypertension. Edematous gallbladder is likely secondary to 3rd spacing of fluid rather than cholecystitis.  -Right upper quadrant ultrasound ordered gallbladder sludge but no gallstones, shows cirrhosis.  - Abnormal LFTs likely secondary to chronic cirrhosis.  - Monitor liver function  - Held statin    Chronic leg wound  -Wound  care.    AAA  -Ascending aorta 4.6 cm diameter. Similar to prior.   -Recommend follow-up.     Chronic: Heart failure reduced ejection fraction, moderate AR, moderate MR, type 2 diabetes, cirrhosis, hypertension, prostate cancer, aortic stenosis status post AVR, HUSSAIN on CPAP, hyperlipidemia, history of diffuse large B-cell lymphoma of kidney, CKD, atrial fibrillation  -Subcutaneous insulin protocol.  -Continue other home medications.     -I have reviewed the copied text and it is accurate as of 4/21/2025     - Patient and family decided for LTC care with hospice.    DVT Prophylaxis: Eliquis  Code Status: DNR/DNI  Diet: Cardiac/renal  Discharge Plan: Pending LTC placement with hospice.    Updated daughter Susana during course.     Brian Joseph Kerley, DO  04/21/25  16:31 EDT    Dictated utilizing Dragon dictation.

## 2025-04-21 NOTE — PLAN OF CARE
Goal Outcome Evaluation:     Palliative Care Team visited pt this  afternoon.  Pt sitting on side of bed with O2 per NC @ 2L.  Pt awake and alert eating lunch.  Pt appears comfortable..    Jimena discussed with pt anticipated DC to LTC facility in [place].  Jimena informed him she thinks his daughter either had gone to see the facility or will be going.     Pt was very interactive with conversation.  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  He explained what the topic was --more historical during the [place] and some chapter details.      Documentation revealed revealed pt accepted 75% breakfast   and last BM documented was 4/20/2025.    PC will continue to follow

## 2025-04-21 NOTE — CASE MANAGEMENT/SOCIAL WORK
Case Management/Social Work    Patient Name:  Blane Dietz  YOB: 1935  MRN: [id number]  Admit Date:  4/7/2025        HARPAL received call from Angelic/Fabien to see if heard from [facility] regarding tour this weekend. HARPAL will reach out to [facility].    HARPAL then received call from [facility] who states they now have a bed open for LTC private pay. She asked for pt info to review and will call this SW back regarding admission date for acceptance. SW following.     16:25 EDT [facility] states pt can admit on Wednesday. HARPAL updated team,       Electronically signed by:  LYNDSAY Bnoe  04/21/25 11:53 EDT

## 2025-04-21 NOTE — CASE MANAGEMENT/SOCIAL WORK
Met with pt. He has not yet heard from Susana, who was going to [place] today.Will follow.

## 2025-04-21 NOTE — CASE MANAGEMENT/SOCIAL WORK
Case Management/Social Work    Patient Name:  Blane Dietz  YOB: 1935  MRN: [id number]  Admit Date:  4/7/2025        14:16 EDT   Discharge Plan       Row Name 04/21/25 1416       Plan    Plan [facility] now has opening for ltc, is meeting with pt's daughter                        Electronically signed by:  Gina Molina RN  04/21/25 14:16 EDT

## 2025-04-21 NOTE — TELEPHONE ENCOUNTER
Patient requests an email and then mail...............  [email or web address]    Timothy  [phone]  [place] [id number]

## 2025-04-21 NOTE — TELEPHONE ENCOUNTER
"Spoke with Susana Patient is still admitted to the Hospital, she is working on getting everything lined out for him, \"he's giving up\" when he gets discharged he will be going to a Nursing home, they are in the middle of selling rental property and they now need a letter since he is unable to do anything. She is currently POA but where its considered a trust, she has to have a letter from [facility] physician.    \"Blane Dietz is unable to handle his affairs. Including buying, selling or signing of any documents. Patient is currently admitted in the hospital for and extended stay and will be placed in a Nursing Facility at discharge.\"      "

## 2025-04-22 LAB
GLUCOSE BLDC GLUCOMTR-MCNC: 152 MG/DL (ref 70–130)
GLUCOSE BLDC GLUCOMTR-MCNC: 257 MG/DL (ref 70–130)
GLUCOSE BLDC GLUCOMTR-MCNC: 317 MG/DL (ref 70–130)
GLUCOSE BLDC GLUCOMTR-MCNC: 91 MG/DL (ref 70–130)

## 2025-04-22 PROCEDURE — 82948 REAGENT STRIP/BLOOD GLUCOSE: CPT | Performed by: FAMILY MEDICINE

## 2025-04-22 PROCEDURE — 63710000001 INSULIN LISPRO (HUMAN) PER 5 UNITS: Performed by: FAMILY MEDICINE

## 2025-04-22 PROCEDURE — 63710000001 INSULIN GLARGINE PER 5 UNITS: Performed by: STUDENT IN AN ORGANIZED HEALTH CARE EDUCATION/TRAINING PROGRAM

## 2025-04-22 PROCEDURE — 82948 REAGENT STRIP/BLOOD GLUCOSE: CPT

## 2025-04-22 PROCEDURE — 99231 SBSQ HOSP IP/OBS SF/LOW 25: CPT | Performed by: STUDENT IN AN ORGANIZED HEALTH CARE EDUCATION/TRAINING PROGRAM

## 2025-04-22 PROCEDURE — 99232 SBSQ HOSP IP/OBS MODERATE 35: CPT | Performed by: PHYSICIAN ASSISTANT

## 2025-04-22 PROCEDURE — 82948 REAGENT STRIP/BLOOD GLUCOSE: CPT | Performed by: STUDENT IN AN ORGANIZED HEALTH CARE EDUCATION/TRAINING PROGRAM

## 2025-04-22 RX ORDER — TRAZODONE HYDROCHLORIDE 50 MG/1
50 TABLET ORAL NIGHTLY
Status: DISCONTINUED | OUTPATIENT
Start: 2025-04-22 | End: 2025-04-23 | Stop reason: HOSPADM

## 2025-04-22 RX ADMIN — Medication 10 ML: at 09:20

## 2025-04-22 RX ADMIN — TRAZODONE HYDROCHLORIDE 50 MG: 50 TABLET ORAL at 21:56

## 2025-04-22 RX ADMIN — INSULIN LISPRO 2 UNITS: 100 INJECTION, SOLUTION INTRAVENOUS; SUBCUTANEOUS at 17:25

## 2025-04-22 RX ADMIN — MIDODRINE HYDROCHLORIDE 5 MG: 5 TABLET ORAL at 17:25

## 2025-04-22 RX ADMIN — INSULIN LISPRO 7 UNITS: 100 INJECTION, SOLUTION INTRAVENOUS; SUBCUTANEOUS at 12:09

## 2025-04-22 RX ADMIN — INSULIN GLARGINE 10 UNITS: 100 INJECTION, SOLUTION SUBCUTANEOUS at 09:21

## 2025-04-22 RX ADMIN — APIXABAN 2.5 MG: 2.5 TABLET, FILM COATED ORAL at 21:56

## 2025-04-22 RX ADMIN — INSULIN LISPRO 4 UNITS: 100 INJECTION, SOLUTION INTRAVENOUS; SUBCUTANEOUS at 09:20

## 2025-04-22 RX ADMIN — METOPROLOL SUCCINATE 25 MG: 25 TABLET, EXTENDED RELEASE ORAL at 09:21

## 2025-04-22 RX ADMIN — APIXABAN 2.5 MG: 2.5 TABLET, FILM COATED ORAL at 09:21

## 2025-04-22 RX ADMIN — HYDROXYZINE PAMOATE 50 MG: 25 CAPSULE ORAL at 21:56

## 2025-04-22 RX ADMIN — AMIODARONE HYDROCHLORIDE 200 MG: 200 TABLET ORAL at 09:22

## 2025-04-22 RX ADMIN — MIDODRINE HYDROCHLORIDE 5 MG: 5 TABLET ORAL at 12:09

## 2025-04-22 RX ADMIN — Medication 10 ML: at 21:56

## 2025-04-22 RX ADMIN — MIDODRINE HYDROCHLORIDE 5 MG: 5 TABLET ORAL at 06:52

## 2025-04-22 NOTE — PLAN OF CARE
Goal Outcome Evaluation:      Palliative Care Team--Jimena VILLA and Marie Finch RN  visited pt this am.  Pt on CPAP, awake and alert.  Pt's primary nurse, Neida CARRILLO, present preparing am med administration.      Sharron UMANA/BRENNAN informed us pt has been accepted at [facility] LTC in [place] with Hospice referral.   The anticipated DC is tomorrow.    Pt verbalized being aware he is going to [facility].  His daughter lives in [place].  Jimena informed pt we will visit again this afternoon so he could .    [facility] (Hospice) contacted ([phone]) to inform of anticipated Hospice referral at DC to [facility] tomorrow.  Demographics and  provided.      1530  Visited pt again this afternoon.  Pt awake and alert, wearing CPAP.  PC team visited with pt for some social conversation as well as plans when he discharges to [facility] tomorrow.  She reminded him that his daughter is in [place] and will be able to visit more.      After visit, Jimena contacted pt's daughter to update her on plan for DC tomorrow to [facility] via ambulance.  We confirmed since he was traveling via EMS and to a facility, his daughter did not need to come to the hospital before he leaves.   She informed Jimena that she plans to be able to visit pt more often once he is in [place].      PC will continue to follow.

## 2025-04-22 NOTE — CASE MANAGEMENT/SOCIAL WORK
Case Management/Social Work    Patient Name:  Blane Dietz  YOB: 1935  MRN: [id number]  Admit Date:  4/7/2025        Pt can admit to [facility] for LTC private pay w/ hospice on Wednesday. Team updated.       Electronically signed by:  LYNDSAY Bone  04/22/25 10:36 EDT

## 2025-04-22 NOTE — CASE MANAGEMENT/SOCIAL WORK
Case Management/Social Work    Patient Name:  Blane Dietz  YOB: 1935  MRN: [id number]  Admit Date:  4/7/2025        11:32 EDT   Discharge Plan       Row Name 04/22/25 1130       Plan    Plan DCP- [facility] tomorrow for LTC placement.                  1100: CM spoke with pt at bedside. DCP remains LTC at [facility].IMM signed at bedside. CM to follow.      Electronically signed by:  Jillian Reyes RN  04/22/25 11:32 EDT

## 2025-04-22 NOTE — PROGRESS NOTES
[facility]    Name:  Blane Dietz   Age:  89 y.o.  Sex:  male  :  1935  MRN:  [id number]   Visit Number:  [id number]  Admission Date:  2025  Date Of Service:  25  Primary Care Physician:  David Em MD     LOS: 15 days :    Chief Complaint:      Shortness of air, N/V/D     Subjective:    Feels good this morning.  Breathing feels good.  Denies any specific pain.    Hospital Course:    Blane Dietz is an 89-year-old man with past medical history of heart failure reduced ejection fraction, moderate AR, moderate MR, type 2 diabetes, cirrhosis, hypertension, prostate cancer, aortic stenosis status post AVR, HUSSAIN on CPAP, hyperlipidemia, history of diffuse large B-cell lymphoma of kidney, CKD, atrial fibrillation.  Presented to [facility] ED on 2025 with concern for several weeks of various degrees of diarrhea, shortness of air with cough.  Family reports the patient actually has been off of this medication for several weeks.  Denied any chest pain, abdominal pain.  Says that he does feel somewhat short of air.  Denied any fevers or chills.     ED summary: Afebrile, atrial fibrillation with RVR rate as high as 160 resolved with cardioversion, hypotension resolved with fluids and Levophed, vital signs stable on 2 L baseline.  EKG initially atrial fibrillation with RVR rate 161.  After resolution sinus rhythm with first-degree AV block, right bundle branch block.  Troponin monitoring 1, 41, ACS not suspected.  proBNP over 18,000.  Sodium 131, anion gap 13.5, BUN 95, creatinine 2.83, EGFR 20, blood glucose 246, calcium 8.0, magnesium 2.8, Phos 6.4, , ALT 77, bilirubin 1.3.  Lactate 2.7, procalcitonin 0.25, no leukocytosis, hemoglobin 12.2.  Blood cultures.  COVID-19 positive.  CT angio chest bilateral pleural effusions, no acute PE, 4.6 cm diameter dilated ascending aorta.  CT ab/pelvis findings suggesting decompensated cirrhotic portal hypertension,  edematous gallbladder likely secondary to third spacing, thick-walled descending colon and distal rectum indeterminant.  He was provided IV amiodarone, vancomycin and cefepime, etomidate, Xylocaine, 2 L bolus.  Started on Levophed.  ED physician placed central line.    Review of Systems:     All systems were reviewed and negative except as mentioned in subjective, assessment and plan.    Vital Signs:    Temp:  [97.2 °F (36.2 °C)-97.9 °F (36.6 °C)] 97.9 °F (36.6 °C)  Heart Rate:  [51-63] 56  Resp:  [18-20] 18  BP: (112-145)/(57-68) 121/68    Intake and output:    I/O last 3 completed shifts:  In: 840 [P.O.:840]  Out: 200 [Urine:200]  I/O this shift:  In: 240 [P.O.:240]  Out: -     Physical Examination:    General Appearance:  Alert and cooperative.  Chronically ill-appearing.  No acute distress.  Generalized weakness noted.   Head:  Atraumatic and normocephalic.   Eyes: Conjunctivae and sclerae normal, no icterus. No pallor. Bruised left eye improving   Throat: No oral lesions, no thrush, oral mucosa moist.   Neck: Supple, trachea midline, no thyromegaly.   Lungs:   Breath sounds heard bilaterally equally.  No wheezing.  Bibasilar rhonchi heard.  Decreased air movement bilaterally.  No Pleural rub or bronchial breathing.  Unlabored.  On supplemental oxygen.   Heart:  Normal S1 and S2, no murmur, no gallop, no rub. No JVD.   Abdomen:   Normal bowel sounds, no masses, no organomegaly. Soft, nontender, nondistended, no rebound tenderness.   Extremities: Supple, no edema, no cyanosis, no clubbing.    Skin: No bleeding.  Superficial chronic leg wound, see photo    Neurologic: Alert and oriented x 3. No facial asymmetry. Moves all four limbs. No tremors.      Laboratory results:    Results from last 7 days   Lab Units 04/20/25  0517 04/18/25  0957 04/17/25  0740   SODIUM mmol/L 140 136 137   POTASSIUM mmol/L 4.7 4.6 4.9   CHLORIDE mmol/L 105 102 99   CO2 mmol/L 23.5 18.9* 21.7*   BUN mg/dL 100* 93* 94*   CREATININE mg/dL  "2.80* 2.95* 2.80*   CALCIUM mg/dL 8.4* 8.1* 8.3*   GLUCOSE mg/dL 128* 197* 136*                           No results for input(s): \"PHART\", \"ESB7WRK\", \"PO2ART\", \"ABE7PNL\", \"BASEEXCESS\" in the last 8760 hours.   I have reviewed the patient's laboratory results.    Radiology results:    No radiology results from the last 24 hrs    I have reviewed the patient's radiology reports.    Medication Review:     I have reviewed the patient's active and prn medications.     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  Transaminitis  Hyperbilirubinemia  Chronic leg wound  AAA     Chronic: Heart failure reduced ejection fraction, moderate AR, moderate MR, type 2 diabetes, cirrhosis, hypertension, prostate cancer, aortic stenosis status post AVR, HUSSAIN on CPAP, hyperlipidemia, history of diffuse large B-cell lymphoma of kidney, CKD, atrial fibrillation    Plan:    Inpatient ICU admission 4/7/2025 with atrial fibrillation with RVR status post cardioversion in ED, hypotension likely multifactorial requiring Levophed, elevated troponins ACS not suspected likely demand ischemia, COVID-19 infection on his baseline 2 L, acute exacerbation of heart failure reduced ejection fraction, hyponatremia, 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    Patient and family decided for transition to LTC facility with hospice.     Atrial fibrillation with RVR, resolved  -Cardiology consultation, recommendations appreciated.  -Status post cardioversion in the ED.  - Per cardiology commendations, continuing IV amiodarone for a total of 48-72 hours. Then can be changed to oral amiodarone therapy at 200 mg once per day.   - switched to p.o. amiodarone.    Elevated troponins  -ACS not suspected, likely demand " ischemia.    Acute exacerbation of heart failure reduced ejection fraction  Bilateral pleural effusions.   -Previous 2D echo on file from December 2024 with EF of 38%  -Daily weight, strict ins and outs  -Diuretics per nephrology  -Cardiology consulted, appreciate recommendations    Hypotension/shock, improved, resolved  -Shock likely multifactorial given multiple chronic severe conditions, but could be related to sepsis  -Central line placed in ED  -added midodrine   -IV pressors titrated off.  - Transferred out of ICU and central line DC'd on 4/11    Colitis, suspected, resolved  -Ct showed: Thick-walled ascending colon and distal rectum are indeterminate. Correlate with signs or symptoms of colitis.  - Initially started vancomycin and cefepime on admission out of an abundance of caution.  -Blood cultures remains negative x 5 days  -Unsure if hypotension has an element of septic shock, likely multifactorial given multiple chronic severe conditions.  -GI panel and C. Difficile both negative  -MRSA screen negative, discontinue vancomycin, continue Zosyn  -WBC WNL, afebrile  - Finished 5 days of cefepime    COVID-19 infection  Chronic respiratory failure  -On his 2 L baseline.  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  -Avoid nephrotoxic drugs  -Diuretics per nephrology  - I personally discussed with Dr. Foote today.    Transaminitis, improved  Hyperbilirubinemia, improved  Cirrhosis  -Findings on imaging suggesting decompensated cirrhotic portal hypertension. Edematous gallbladder is likely secondary to 3rd spacing of fluid rather than cholecystitis.  -Right upper quadrant ultrasound ordered gallbladder sludge but no gallstones, shows cirrhosis.  - Abnormal LFTs likely secondary to chronic cirrhosis.  - Monitor liver function  - Held  statin    Chronic leg wound  -Wound care.    AAA  -Ascending aorta 4.6 cm diameter. Similar to prior.   -Recommend follow-up.     Chronic: Heart failure reduced ejection fraction, moderate AR, moderate MR, type 2 diabetes, cirrhosis, hypertension, prostate cancer, aortic stenosis status post AVR, HUSSAIN on CPAP, hyperlipidemia, history of diffuse large B-cell lymphoma of kidney, CKD, atrial fibrillation  -Subcutaneous insulin protocol.  -Continue other home medications.     -I have reviewed the copied text and it is accurate as of 4/22/2025     - Patient and family decided for LTC care with hospice.    DVT Prophylaxis: Eliquis  Code Status: DNR/DNI  Diet: Cardiac/renal  Discharge Plan: Pending LTC placement with hospice.    Updated daughter Susana during course.     Brian Joseph Kerley, DO  04/22/25  15:39 EDT    Dictated utilizing Dragon dictation.

## 2025-04-22 NOTE — NURSING NOTE
Pt refusing full skin assessment at this time. Wound care completed on LLE, but patient stated he didn't feel like turning in bed for skin assessment and wound care of backside.

## 2025-04-22 NOTE — PLAN OF CARE
Problem: Adult Inpatient Plan of Care  Goal: Absence of Hospital-Acquired Illness or Injury  Intervention: Identify and Manage Fall Risk  Recent Flowsheet Documentation  Taken 4/22/2025 0420 by Roya Gray RN  Safety Promotion/Fall Prevention:   activity supervised   assistive device/personal items within reach   clutter free environment maintained   fall prevention program maintained   room organization consistent   safety round/check completed  Taken 4/22/2025 0205 by Roya Gray RN  Safety Promotion/Fall Prevention:   activity supervised   assistive device/personal items within reach   clutter free environment maintained   fall prevention program maintained   room organization consistent   safety round/check completed  Taken 4/22/2025 0010 by Roya Gray RN  Safety Promotion/Fall Prevention:   activity supervised   assistive device/personal items within reach   clutter free environment maintained   fall prevention program maintained   room organization consistent   safety round/check completed  Taken 4/21/2025 2205 by Roya Gray RN  Safety Promotion/Fall Prevention:   activity supervised   assistive device/personal items within reach   clutter free environment maintained   fall prevention program maintained   room organization consistent   safety round/check completed  Taken 4/21/2025 2147 by Roya Gray RN  Safety Promotion/Fall Prevention:   activity supervised   assistive device/personal items within reach   clutter free environment maintained   fall prevention program maintained   room organization consistent   safety round/check completed  Taken 4/21/2025 2018 by Roya Gray RN  Safety Promotion/Fall Prevention:   activity supervised   assistive device/personal items within reach   clutter free environment maintained   fall prevention program maintained   room organization consistent   safety round/check completed  Intervention: Prevent Skin Injury  Recent Flowsheet Documentation  Taken  4/22/2025 0420 by Roya Gray RN  Body Position:   sitting up in bed   tilted   heels elevated   weight shifting   left  Taken 4/22/2025 0205 by Roya Gray RN  Body Position:   sitting up in bed   heels elevated   weight shifting   neutral head position   neutral body alignment  Taken 4/22/2025 0010 by Roya Gray RN  Body Position:   sitting up in bed   tilted   heels elevated   weight shifting   left  Taken 4/21/2025 2205 by Roya Gray RN  Body Position:   sitting up in bed   tilted   right   heels elevated  Taken 4/21/2025 2147 by Roya Gray RN  Body Position:   sitting up in bed   tilted   right  Skin Protection:   hydrocolloids used   incontinence pads utilized   protective footwear used   pulse oximeter probe site changed   skin sealant/moisture barrier applied  Taken 4/21/2025 2018 by Roya Gray RN  Body Position:   sitting up in bed   neutral head position   neutral body alignment   heels elevated  Intervention: Prevent and Manage VTE (Venous Thromboembolism) Risk  Recent Flowsheet Documentation  Taken 4/21/2025 2147 by Roya Gray RN  VTE Prevention/Management:   SCDs (sequential compression devices) off   patient refused intervention  Intervention: Prevent Infection  Recent Flowsheet Documentation  Taken 4/22/2025 0420 by Roya Gray RN  Infection Prevention:   environmental surveillance performed   single patient room provided  Taken 4/22/2025 0205 by Roya Gray RN  Infection Prevention:   environmental surveillance performed   single patient room provided  Taken 4/22/2025 0010 by Roya Gray RN  Infection Prevention:   environmental surveillance performed   single patient room provided  Taken 4/21/2025 2205 by Roya Gray RN  Infection Prevention:   environmental surveillance performed   single patient room provided  Taken 4/21/2025 2147 by Roya Gray RN  Infection Prevention:   environmental surveillance performed   single patient room provided  Taken 4/21/2025 2018 by  Isaac, Laili, RN  Infection Prevention:   environmental surveillance performed   single patient room provided  Goal: Optimal Comfort and Wellbeing  Intervention: Provide Person-Centered Care  Recent Flowsheet Documentation  Taken 4/21/2025 2147 by Roya Gray RN  Trust Relationship/Rapport:   care explained   reassurance provided   questions encouraged   questions answered   thoughts/feelings acknowledged  Goal: Plan of Care Review  Recent Flowsheet Documentation  Taken 4/22/2025 0421 by Roya Gray RN  Progress: no change  Outcome Evaluation: Patient currently asleep. Wound care completed on LLE during shift, see flowsheets. Patient refused assessment and wound care of backside during shift. Peripheral IV dressing changed. Patient requested PRN Vistaril before bed last night, see MAR.  Plan of Care Reviewed With: patient  Goal: Absence of Hospital-Acquired Illness or Injury  Intervention: Identify and Manage Fall Risk  Recent Flowsheet Documentation  Taken 4/22/2025 0420 by Roya Gray RN  Safety Promotion/Fall Prevention:   activity supervised   assistive device/personal items within reach   clutter free environment maintained   fall prevention program maintained   room organization consistent   safety round/check completed  Taken 4/22/2025 0205 by Roya Gray RN  Safety Promotion/Fall Prevention:   activity supervised   assistive device/personal items within reach   clutter free environment maintained   fall prevention program maintained   room organization consistent   safety round/check completed  Taken 4/22/2025 0010 by Roya Gray RN  Safety Promotion/Fall Prevention:   activity supervised   assistive device/personal items within reach   clutter free environment maintained   fall prevention program maintained   room organization consistent   safety round/check completed  Taken 4/21/2025 2205 by Roya Gray RN  Safety Promotion/Fall Prevention:   activity supervised   assistive device/personal  items within reach   clutter free environment maintained   fall prevention program maintained   room organization consistent   safety round/check completed  Taken 4/21/2025 2147 by Roya Gray RN  Safety Promotion/Fall Prevention:   activity supervised   assistive device/personal items within reach   clutter free environment maintained   fall prevention program maintained   room organization consistent   safety round/check completed  Taken 4/21/2025 2018 by Roya Gray RN  Safety Promotion/Fall Prevention:   activity supervised   assistive device/personal items within reach   clutter free environment maintained   fall prevention program maintained   room organization consistent   safety round/check completed  Intervention: Prevent Skin Injury  Recent Flowsheet Documentation  Taken 4/22/2025 0420 by Roya Gray RN  Body Position:   sitting up in bed   tilted   heels elevated   weight shifting   left  Taken 4/22/2025 0205 by Roya Gray RN  Body Position:   sitting up in bed   heels elevated   weight shifting   neutral head position   neutral body alignment  Taken 4/22/2025 0010 by Roya Gray RN  Body Position:   sitting up in bed   tilted   heels elevated   weight shifting   left  Taken 4/21/2025 2205 by Roya Gray RN  Body Position:   sitting up in bed   tilted   right   heels elevated  Taken 4/21/2025 2147 by Roya Gray RN  Body Position:   sitting up in bed   tilted   right  Skin Protection:   hydrocolloids used   incontinence pads utilized   protective footwear used   pulse oximeter probe site changed   skin sealant/moisture barrier applied  Taken 4/21/2025 2018 by Roya Gray RN  Body Position:   sitting up in bed   neutral head position   neutral body alignment   heels elevated  Intervention: Prevent and Manage VTE (Venous Thromboembolism) Risk  Recent Flowsheet Documentation  Taken 4/21/2025 2147 by Roya Gray RN  VTE Prevention/Management:   SCDs (sequential compression devices) off    patient refused intervention  Intervention: Prevent Infection  Recent Flowsheet Documentation  Taken 4/22/2025 0420 by Roya Gray RN  Infection Prevention:   environmental surveillance performed   single patient room provided  Taken 4/22/2025 0205 by Roya Gray RN  Infection Prevention:   environmental surveillance performed   single patient room provided  Taken 4/22/2025 0010 by Roya Gray RN  Infection Prevention:   environmental surveillance performed   single patient room provided  Taken 4/21/2025 2205 by Roya Gray RN  Infection Prevention:   environmental surveillance performed   single patient room provided  Taken 4/21/2025 2147 by Roya Gray RN  Infection Prevention:   environmental surveillance performed   single patient room provided  Taken 4/21/2025 2018 by Roya Gray RN  Infection Prevention:   environmental surveillance performed   single patient room provided  Goal: Optimal Comfort and Wellbeing  Intervention: Provide Person-Centered Care  Recent Flowsheet Documentation  Taken 4/21/2025 2147 by Roya Gray RN  Trust Relationship/Rapport:   care explained   reassurance provided   questions encouraged   questions answered   thoughts/feelings acknowledged     Problem: Comorbidity Management  Goal: Maintenance of Heart Failure Symptom Control  Intervention: Maintain Heart Failure Management  Recent Flowsheet Documentation  Taken 4/21/2025 2147 by Roya Gray RN  Medication Review/Management: medications reviewed     Problem: Skin Injury Risk Increased  Goal: Skin Health and Integrity  Intervention: Optimize Skin Protection  Recent Flowsheet Documentation  Taken 4/22/2025 0420 by Roya Gray RN  Activity Management: (Rest and repositioning encouraged) other (see comments)  Head of Bed (HOB) Positioning: HOB at 30 degrees  Taken 4/22/2025 0205 by Roya Gray RN  Activity Management: up to bedside commode  Head of Bed (HOB) Positioning: HOB at 60-90 degrees  Taken 4/22/2025 0010 by  Isaac, Laili, RN  Activity Management: (rest and repositioning encouraged) other (see comments)  Head of Bed (HOB) Positioning: HOB at 30 degrees  Taken 4/21/2025 2205 by Roya Gray RN  Activity Management: (rest and repositioning encouraged) other (see comments)  Head of Bed (HOB) Positioning: HOB at 30 degrees  Taken 4/21/2025 2147 by Roya Gray RN  Activity Management: (rest and repositioning encouraged) other (see comments)  Pressure Reduction Techniques:   frequent weight shift encouraged   heels elevated off bed   positioned off wounds   pressure points protected   weight shift assistance provided  Head of Bed (HOB) Positioning: HOB at 30-45 degrees  Pressure Reduction Devices:   heel offloading device utilized   positioning supports utilized  Skin Protection:   hydrocolloids used   incontinence pads utilized   protective footwear used   pulse oximeter probe site changed   skin sealant/moisture barrier applied  Taken 4/21/2025 2018 by Roya Gray RN  Activity Management: (rest and repositioning encouraged) other (see comments)  Head of Bed (HOB) Positioning: HOB at 30 degrees     Problem: Heart Failure  Goal: Effective Oxygenation and Ventilation  Intervention: Promote Airway Secretion Clearance  Recent Flowsheet Documentation  Taken 4/22/2025 0420 by Roya Gray RN  Activity Management: (Rest and repositioning encouraged) other (see comments)  Cough And Deep Breathing: done independently per patient  Taken 4/22/2025 0205 by Roya Gray RN  Activity Management: up to bedside commode  Taken 4/22/2025 0010 by Roya Gray RN  Activity Management: (rest and repositioning encouraged) other (see comments)  Cough And Deep Breathing: done with encouragement  Taken 4/21/2025 2205 by Roya Gray RN  Activity Management: (rest and repositioning encouraged) other (see comments)  Taken 4/21/2025 2147 by Roya Gray RN  Activity Management: (rest and repositioning encouraged) other (see  comments)  Breathing Techniques/Airway Clearance: deep/controlled cough encouraged  Cough And Deep Breathing: done with encouragement  Taken 4/21/2025 2018 by Roya Gray RN  Activity Management: (rest and repositioning encouraged) other (see comments)  Intervention: Optimize Oxygenation and Ventilation  Recent Flowsheet Documentation  Taken 4/22/2025 0420 by Roya Gray RN  Head of Bed (HOB) Positioning: HOB at 30 degrees  Taken 4/22/2025 0205 by Roya Gray RN  Head of Bed (HOB) Positioning: HOB at 60-90 degrees  Taken 4/22/2025 0010 by Roya Gray RN  Head of Bed (HOB) Positioning: HOB at 30 degrees  Taken 4/21/2025 2205 by Roya Gray RN  Head of Bed (HOB) Positioning: HOB at 30 degrees  Taken 4/21/2025 2147 by Roya Gray RN  Head of Bed (HOB) Positioning: HOB at 30-45 degrees  Taken 4/21/2025 2018 by Roya Gray RN  Head of Bed (HOB) Positioning: HOB at 30 degrees  Goal: Effective Breathing Pattern During Sleep  Intervention: Monitor and Manage Obstructive Sleep Apnea  Recent Flowsheet Documentation  Taken 4/21/2025 2147 by Roya Gray RN  NPPV/CPAP Maintenance: home PAP equipment/settings used  Medication Review/Management: medications reviewed     Problem: Fall Injury Risk  Goal: Absence of Fall and Fall-Related Injury  Intervention: Identify and Manage Contributors  Recent Flowsheet Documentation  Taken 4/21/2025 2147 by Roya Gray RN  Medication Review/Management: medications reviewed  Self-Care Promotion:   independence encouraged   BADL personal objects within reach   BADL personal routines maintained  Intervention: Promote Injury-Free Environment  Recent Flowsheet Documentation  Taken 4/22/2025 0420 by Roya Gray RN  Safety Promotion/Fall Prevention:   activity supervised   assistive device/personal items within reach   clutter free environment maintained   fall prevention program maintained   room organization consistent   safety round/check completed  Taken 4/22/2025 0205 by  Isaac, Laili, RN  Safety Promotion/Fall Prevention:   activity supervised   assistive device/personal items within reach   clutter free environment maintained   fall prevention program maintained   room organization consistent   safety round/check completed  Taken 4/22/2025 0010 by Roya Gray RN  Safety Promotion/Fall Prevention:   activity supervised   assistive device/personal items within reach   clutter free environment maintained   fall prevention program maintained   room organization consistent   safety round/check completed  Taken 4/21/2025 2205 by Roya Gray RN  Safety Promotion/Fall Prevention:   activity supervised   assistive device/personal items within reach   clutter free environment maintained   fall prevention program maintained   room organization consistent   safety round/check completed  Taken 4/21/2025 2147 by Roya Gray RN  Safety Promotion/Fall Prevention:   activity supervised   assistive device/personal items within reach   clutter free environment maintained   fall prevention program maintained   room organization consistent   safety round/check completed  Taken 4/21/2025 2018 by Roya Gray RN  Safety Promotion/Fall Prevention:   activity supervised   assistive device/personal items within reach   clutter free environment maintained   fall prevention program maintained   room organization consistent   safety round/check completed  Goal: Absence of Fall and Fall-Related Injury  Intervention: Identify and Manage Contributors  Recent Flowsheet Documentation  Taken 4/21/2025 2147 by Roya Gray RN  Medication Review/Management: medications reviewed  Self-Care Promotion:   independence encouraged   BADL personal objects within reach   BADL personal routines maintained  Intervention: Promote Injury-Free Environment  Recent Flowsheet Documentation  Taken 4/22/2025 0420 by Roya Gray RN  Safety Promotion/Fall Prevention:   activity supervised   assistive device/personal items  within reach   clutter free environment maintained   fall prevention program maintained   room organization consistent   safety round/check completed  Taken 4/22/2025 0205 by Roya Gray RN  Safety Promotion/Fall Prevention:   activity supervised   assistive device/personal items within reach   clutter free environment maintained   fall prevention program maintained   room organization consistent   safety round/check completed  Taken 4/22/2025 0010 by Roya Gray RN  Safety Promotion/Fall Prevention:   activity supervised   assistive device/personal items within reach   clutter free environment maintained   fall prevention program maintained   room organization consistent   safety round/check completed  Taken 4/21/2025 2205 by Roya Gray RN  Safety Promotion/Fall Prevention:   activity supervised   assistive device/personal items within reach   clutter free environment maintained   fall prevention program maintained   room organization consistent   safety round/check completed  Taken 4/21/2025 2147 by Roya Gray RN  Safety Promotion/Fall Prevention:   activity supervised   assistive device/personal items within reach   clutter free environment maintained   fall prevention program maintained   room organization consistent   safety round/check completed  Taken 4/21/2025 2018 by Roya Gray RN  Safety Promotion/Fall Prevention:   activity supervised   assistive device/personal items within reach   clutter free environment maintained   fall prevention program maintained   room organization consistent   safety round/check completed     Problem: Gas Exchange Impaired  Goal: Optimal Gas Exchange  Intervention: Optimize Oxygenation and Ventilation  Recent Flowsheet Documentation  Taken 4/22/2025 0420 by Roya Gray RN  Head of Bed (HOB) Positioning: HOB at 30 degrees  Taken 4/22/2025 0205 by Roya Gray RN  Head of Bed (HOB) Positioning: HOB at 60-90 degrees  Taken 4/22/2025 0010 by Roya Gray RN  Head of  Bed (HOB) Positioning: HOB at 30 degrees  Taken 4/21/2025 2205 by Roya Gray RN  Head of Bed (HOB) Positioning: HOB at 30 degrees  Taken 4/21/2025 2147 by Roya Gray RN  Head of Bed (HOB) Positioning: HOB at 30-45 degrees  Taken 4/21/2025 2018 by Roya Gray RN  Head of Bed (HOB) Positioning: HOB at 30 degrees   Goal Outcome Evaluation:  Plan of Care Reviewed With: patient        Progress: no change  Outcome Evaluation: Patient currently asleep. Wound care completed on LLE during shift, see flowsheets. Patient refused assessment and wound care of backside during shift. Peripheral IV dressing changed. Patient requested PRN Vistaril before bed last night, see MAR.

## 2025-04-22 NOTE — PROGRESS NOTES
"    [facility]     PALLIATIVE CARE FOLLOW UP NOTE    Name:  Blane Dietz   Age:  89 y.o.  Sex:  male  :  1935  MRN:  [id number]   Visit Number:  [id number]  Date Of Service:  25  Primary Care Physician:  David Em MD    Chief Complaint: Generalized weakness    Interval History:  Patient seen today during palliative care team rounds.  Record reviewed and case discussed with staff, MARY.  He is in good spirits, anticipating discharge tomorrow to LTC facility in [place].  He denies any acute c/o. Appetite is labile, good breakfast at 75%.  Last BM .        Review of Systems   Constitutional:  Positive for activity change and fatigue.   Respiratory:  Positive for shortness of breath (with exertion).    Neurological:  Positive for weakness.          Pain Assessment  Nonverbal Indicators of Pain: nonverbal indicators absent  Pain Location: rectal  Pain Description: intermittent, sharp  Vitals: /57 (BP Location: Left arm, Patient Position: Lying)   Pulse 51   Temp 97.2 °F (36.2 °C) (Axillary)   Resp 18   Ht 167.6 cm (66\")   Wt 92.1 kg (203 lb 0.7 oz)   SpO2 90%   BMI 32.77 kg/m²     Physical Exam  Vitals and nursing note reviewed.   Constitutional:       General: He is not in acute distress.     Appearance: He is not diaphoretic.      Comments: Elderly male lying in bed, NAD   HENT:      Head: Normocephalic and atraumatic.      Mouth/Throat:      Pharynx: Oropharynx is clear.      Comments: CPAP in place  Eyes:      Conjunctiva/sclera: Conjunctivae normal.      Pupils: Pupils are equal, round, and reactive to light.   Cardiovascular:      Comments: Appears well perfused   Pulmonary:      Effort: Pulmonary effort is normal. No respiratory distress.   Abdominal:      General: There is no distension.      Palpations: Abdomen is soft.      Tenderness: There is no abdominal tenderness.   Musculoskeletal:         General: No swelling. Normal range of motion.      Cervical " back: Normal range of motion and neck supple.   Skin:     General: Skin is warm and dry.      Capillary Refill: Capillary refill takes less than 2 seconds.   Neurological:      Mental Status: He is alert and oriented to person, place, and time.   Psychiatric:         Mood and Affect: Mood normal.         Behavior: Behavior normal.          Results Reviewed:    Intake/Output Summary (Last 24 hours) at 4/22/2025 0826  Last data filed at 4/22/2025 0205  Gross per 24 hour   Intake 480 ml   Output 200 ml   Net 280 ml     Results from last 7 days   Lab Units 04/20/25  0517   SODIUM mmol/L 140   POTASSIUM mmol/L 4.7   CHLORIDE mmol/L 105   CO2 mmol/L 23.5   BUN mg/dL 100*   CREATININE mg/dL 2.80*   CALCIUM mg/dL 8.4*   GLUCOSE mg/dL 128*             Medication Review:   I have reviewed the patients active and prn medications.     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  Cirrhosis  Debility    Recommendations/Plan:  - GOC discussions yield desire to proceed with hospice services upon discharge, interested in transitioning to LTC facility, CM following and anticipate discharge tomorrow  - Patient has previously expressed no desire for further escalation of care, in the event he has acute decompensation, likely would desire transition to comfort measures   - Patient likely qualifies for hospice under general criteria given his advanced age, multiple comorbid conditions, and progressive functional decline, and poor nutrition  - Life limiting condition  - Treatment goals are for comfort rather than cure  - Documented terminal disease   - Decline in nutritional status   - Clinical progression of disease  - Repeated inpatient admission/ED evaluation  - Functional status decline  - KPS/PPS <70  - Consider Trazodone for sleep, he defers use of melatonin  - Last BM 4/22  - PC RN will initiate referral to  [facility] today in anticipation of dc  - I spoke with his daughter/POA by phone, all questions/concerns addressed   - Palliative care will continue to follow/support patient and family        CODE STATUS:   Code Status and Medical Interventions: No CPR (Do Not Attempt to Resuscitate); Limited Support; No intubation (DNI)   Ordered at: 04/08/25 0054     Code Status (Patient has no pulse and is not breathing):    No CPR (Do Not Attempt to Resuscitate)     Medical Interventions (Patient has pulse or is breathing):    Limited Support     Medical Intervention Limits:    No intubation (DNI)         I spent 35 total minutes, including face to face assessment, record review, coordination of care with staff, and counseling patient and/or family  Part of this note may be an electronic transcription/translation of spoken language to printed text using the Dragon Dictation System.    Jimena Silvestre PA-C  04/22/25  08:26 EDT

## 2025-04-23 VITALS
HEART RATE: 53 BPM | DIASTOLIC BLOOD PRESSURE: 59 MMHG | TEMPERATURE: 97.9 F | SYSTOLIC BLOOD PRESSURE: 131 MMHG | HEIGHT: 66 IN | OXYGEN SATURATION: 90 % | BODY MASS INDEX: 32.88 KG/M2 | RESPIRATION RATE: 18 BRPM | WEIGHT: 204.59 LBS

## 2025-04-23 PROBLEM — A04.9 BACTERIAL COLITIS: Status: ACTIVE | Noted: 2025-04-23

## 2025-04-23 LAB
GLUCOSE BLDC GLUCOMTR-MCNC: 135 MG/DL (ref 70–130)
GLUCOSE BLDC GLUCOMTR-MCNC: 255 MG/DL (ref 70–130)
GLUCOSE BLDC GLUCOMTR-MCNC: 337 MG/DL (ref 70–130)

## 2025-04-23 PROCEDURE — 82948 REAGENT STRIP/BLOOD GLUCOSE: CPT | Performed by: STUDENT IN AN ORGANIZED HEALTH CARE EDUCATION/TRAINING PROGRAM

## 2025-04-23 PROCEDURE — 63710000001 INSULIN LISPRO (HUMAN) PER 5 UNITS: Performed by: FAMILY MEDICINE

## 2025-04-23 PROCEDURE — 99239 HOSP IP/OBS DSCHRG MGMT >30: CPT | Performed by: STUDENT IN AN ORGANIZED HEALTH CARE EDUCATION/TRAINING PROGRAM

## 2025-04-23 PROCEDURE — 82948 REAGENT STRIP/BLOOD GLUCOSE: CPT

## 2025-04-23 RX ORDER — HYDROXYZINE PAMOATE 50 MG/1
50 CAPSULE ORAL 3 TIMES DAILY PRN
Start: 2025-04-23

## 2025-04-23 RX ORDER — MIDODRINE HYDROCHLORIDE 5 MG/1
5 TABLET ORAL
Start: 2025-04-23

## 2025-04-23 RX ORDER — INSULIN GLARGINE 100 [IU]/ML
INJECTION, SOLUTION SUBCUTANEOUS
Qty: 20 ML | Refills: 5 | Status: SHIPPED | OUTPATIENT
Start: 2025-04-23

## 2025-04-23 RX ORDER — TRAZODONE HYDROCHLORIDE 50 MG/1
50 TABLET ORAL NIGHTLY
Start: 2025-04-23

## 2025-04-23 RX ADMIN — INSULIN LISPRO 7 UNITS: 100 INJECTION, SOLUTION INTRAVENOUS; SUBCUTANEOUS at 11:54

## 2025-04-23 RX ADMIN — APIXABAN 2.5 MG: 2.5 TABLET, FILM COATED ORAL at 09:37

## 2025-04-23 RX ADMIN — METOPROLOL SUCCINATE 25 MG: 25 TABLET, EXTENDED RELEASE ORAL at 09:37

## 2025-04-23 RX ADMIN — MIDODRINE HYDROCHLORIDE 5 MG: 5 TABLET ORAL at 06:42

## 2025-04-23 RX ADMIN — Medication 10 ML: at 09:38

## 2025-04-23 RX ADMIN — INSULIN LISPRO 6 UNITS: 100 INJECTION, SOLUTION INTRAVENOUS; SUBCUTANEOUS at 09:37

## 2025-04-23 RX ADMIN — MIDODRINE HYDROCHLORIDE 5 MG: 5 TABLET ORAL at 11:54

## 2025-04-23 RX ADMIN — AMIODARONE HYDROCHLORIDE 200 MG: 200 TABLET ORAL at 09:37

## 2025-04-23 NOTE — DISCHARGE SUMMARY
[facility]   DISCHARGE SUMMARY      Name:  Blane Dietz   Age:  89 y.o.  Sex:  male  :  1935  MRN:  [id number]   Visit Number:  [id number]    Admission Date:  2025  Date of Discharge:  2025  Primary Care Physician:  David Em MD        Problem List:     Active Hospital Problems    Diagnosis  POA    **COVID-19 [U07.1]  Yes    Bacterial colitis [A04.9]  Yes      Resolved Hospital Problems   No resolved problems to display.     Presenting Problem:    Chief Complaint   Patient presents with    Shortness of Breath      Consults:     Consulting Physician(s)         Provider   Role Specialty     Andriy Foote MD, [place]      Consulting Physician Nephrology              History of presenting illness/Hospital Course:    Blane Dietz is an 89-year-old man with past medical history of heart failure reduced ejection fraction, moderate AR, moderate MR, type 2 diabetes, cirrhosis, hypertension, prostate cancer, aortic stenosis status post AVR, HUSSAIN on CPAP, hyperlipidemia, history of diffuse large B-cell lymphoma of kidney, CKD, atrial fibrillation.  Presented to [facility] ED on 2025 with concern for several weeks of various degrees of diarrhea, shortness of air with cough.  Family reports the patient actually has been off of this medication for several weeks.  Denied any chest pain, abdominal pain.  Says that he does feel somewhat short of air.  Denied any fevers or chills.     ED summary: Afebrile, atrial fibrillation with RVR rate as high as 160 resolved with cardioversion, hypotension resolved with fluids and Levophed, vital signs stable on 2 L baseline.  EKG initially atrial fibrillation with RVR rate 161.  After resolution sinus rhythm with first-degree AV block, right bundle branch block.  Troponin monitoring 1, 41, ACS not suspected.  proBNP over 18,000.  Sodium 131, anion gap 13.5, BUN 95, creatinine 2.83, EGFR 20, blood glucose 246, calcium 8.0, magnesium 2.8, Phos  6.4, , ALT 77, bilirubin 1.3.  Lactate 2.7, procalcitonin 0.25, no leukocytosis, hemoglobin 12.2.  Blood cultures.  COVID-19 positive.  CT angio chest bilateral pleural effusions, no acute PE, 4.6 cm diameter dilated ascending aorta.  CT ab/pelvis findings suggesting decompensated cirrhotic portal hypertension, edematous gallbladder likely secondary to third spacing, thick-walled descending colon and distal rectum indeterminant.  He was provided IV amiodarone, vancomycin and cefepime, etomidate, Xylocaine, 2 L bolus.  Started on Levophed.  ED physician placed central line.    Inpatient ICU admission 4/7/2025 with atrial fibrillation with RVR status post cardioversion in ED, hypotension likely multifactorial requiring Levophed, elevated troponins ACS not suspected likely demand ischemia, COVID-19 infection on his baseline 2 L, acute exacerbation of heart failure reduced ejection fraction, hyponatremia, 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    Shock resolved with treatment, transferred out of the ICU on 4/11.  With acute conditions, clinically improved during course.  Multiple severe advanced chronic conditions including cirrhosis, heart failure, chronic kidney disease.  Patient decided he would like no further escalation of care if he were to decompensate.     Patient and family decided for transition to LTC facility with hospice.    Stable for discharge to long-term care with hospice 4/23/2025.    Atrial fibrillation with RVR atrial fibrillation with RVR  Continue amiodarone  Elevated troponins  ACS not suspected, likely demand ischemia  Heart failure with reduced ejection fraction  Hypotension  Continue midodrine,  Shock, resolved  Colitis, resolved  Completed course of vancomycin and cefepime, blood cultures negative.  COVID-19 infection  Clinically improved  Stable on his home 2 L  Continue BiPAP during sleep and at rest  CKD  Cirrhosis  Chronic leg wound  AAA  No  further escalation in care.  Continue hospice at long-term care.  Chronic: Heart failure reduced ejection fraction, moderate AR, moderate MR, type 2 diabetes, cirrhosis, hypertension, prostate cancer, aortic stenosis status post AVR, HUSSAIN on CPAP, hyperlipidemia, history of diffuse large B-cell lymphoma of kidney, CKD, atrial fibrillation   May continue any chronic medications as he would like.  Continue long-term care hospice.  Continue trazodone for insomnia        Vital Signs:    Temp:  [97.9 °F (36.6 °C)] 97.9 °F (36.6 °C)  Heart Rate:  [53-56] 53  Resp:  [18] 18  BP: (121-131)/(59-68) 131/59    Physical Exam:    General Appearance:  Alert and cooperative.  Chronically ill-appearing.  No acute distress.  Generalized weakness noted.   Head:  Atraumatic and normocephalic.   Eyes: Conjunctivae and sclerae normal, no icterus. No pallor. Bruised left eye improving   Throat: No oral lesions, no thrush, oral mucosa moist.   Neck: Supple, trachea midline, no thyromegaly.   Lungs:   Breath sounds heard bilaterally equally.  No wheezing.  Bibasilar rhonchi heard.  Decreased air movement bilaterally.  No Pleural rub or bronchial breathing.  Unlabored.  On supplemental oxygen.   Heart:  Normal S1 and S2, no murmur, no gallop, no rub. No JVD.   Abdomen:   Normal bowel sounds, no masses, no organomegaly. Soft, nontender, nondistended, no rebound tenderness.   Extremities: Supple, no edema, no cyanosis, no clubbing.    Skin: No bleeding.  Superficial chronic leg wound, see photo    Neurologic: Alert and oriented x 3. No facial asymmetry. Moves all four limbs. No tremors.      Pertinent Lab Results:     Results from last 7 days   Lab Units 04/20/25  0517 04/18/25  0957 04/17/25  0740   SODIUM mmol/L 140 136 137   POTASSIUM mmol/L 4.7 4.6 4.9   CHLORIDE mmol/L 105 102 99   CO2 mmol/L 23.5 18.9* 21.7*   BUN mg/dL 100* 93* 94*   CREATININE mg/dL 2.80* 2.95* 2.80*   CALCIUM mg/dL 8.4* 8.1* 8.3*   GLUCOSE mg/dL 128* 197* 136*                                        Pertinent Radiology Results:    Imaging Results (All)       Procedure Component Value Units Date/Time    US Gallbladder [[id number]] Collected: 04/09/25 0943     Updated: 04/09/25 0949    Narrative:      RIGHT UPPER QUADRANT ULTRASOUND     HISTORY: Abnormal CT abdomen pelvis, edematous gallbladder;  U07.1-COVID-19; R57.9-Shock, unspecified; I48.91-Unspecified atrial  fibrillation     COMPARISON: None.     PROCEDURE: Ultrasound images of the right upper quadrant were obtained.     FINDINGS:  The liver parenchyma is heterogeneous with mildly nodular  contour and perihepatic ascites suggesting chronic liver disease such as  cirrhosis. Recommend correlation with liver function test. Liver  measures 12.8 cm in length. There is no focal abnormality. Portal vein  measures 13 mm in diameter, within normal limits. Right pleural effusion  noted. The gallbladder is proper size with no wall thickening or  pericholecystic fluid. There are  no gallstones. There is some dependent  hypoechoic material consistent with sludge. No evidence of ductal  dilatation is identified. Limited images of the pancreas are  obscured  by bowel gas. Right kidney absent. [  ]       Impression:      Nodular, heterogeneous liver with ascites suggesting  cirrhosis. Recommend correlation with liver function test.     Right pleural effusion as seen on chest CT of 4/7/2025.     Gallbladder sludge but no gallstones identified.      This report was signed and finalized on 4/9/2025 9:47 AM by Di Ayon MD.       CT Angiogram Chest Pulmonary Embolism [[id number]] Collected: 04/07/25 2154     Updated: 04/07/25 2155    Narrative:      FINAL REPORT    TECHNIQUE:  null    CLINICAL HISTORY:  Hypotension, shock    COMPARISON:  null    FINDINGS:  CT angiography chest with contrast. 3D Postprocessing.    Comparison: CT - CT ABDOMEN PELVIS W CONTRAST - 4/7/25 20:55 EDT    CT/SR - CT ANGIOGRAM CHEST PULMONARY EMBOLISM - 12/21/24 19:34  EST    Findings:    Cardiomegaly. No pericardial effusion. RV/LV ratio normal.    Ascending aorta 4.6 cm diameter. Lumen is not opacified. Similar to prior.    Enlarged pulmonary artery. This can be seen with pulmonary artery hypertension.    The visualized thyroid and mediastinum are unremarkable.    Lower lobe pulmonary arteries are not opacified. Likely due to poor cardiac output.    Large right and small left pleural effusions.    Dependent atelectasis both lungs, more prominent on the right.    No pneumothorax.    Abdominal findings are discussed on the same day comparison.    No acute fractures.      Impression:      IMPRESSION:    1. Bilateral pleural effusions. Given the abnormal findings, this could be secondary to decompensated cirrhotic portal hypertension.    2. No acute pulmonary embolus within the limitations of the exam.    3. 4.6 cm diameter dilated ascending aorta. Recommend follow-up as needed.    4. Additional findings as above.    Authenticated and Electronically Signed by Ventura Obando MD  on 04/07/2025 09:54:16 PM    CT Abdomen Pelvis With Contrast [[id number]] Collected: 04/07/25 2155     Updated: 04/07/25 2155    Narrative:      FINAL REPORT    TECHNIQUE:  null    CLINICAL HISTORY:  Hypotension, shock    COMPARISON:  null    FINDINGS:  CT abdomen and pelvis with contrast    Comparison: CT - CT ANGIOGRAM CHEST PULMONARY EMBOLISM - 4/7/25 20:55 EDT    Findings:    Large right and small left pleural effusions with associated atelectasis.    Cardiomegaly.    Relatively small lobulated liver. No bile duct dilatation. Main portal vein 1.7 cm diameter with recanalized umbilical vein collateral.    2 mm nonobstructing left inferior renal stone. Subcentimeter left mid renal cyst.    Right nephrectomy.    Edematous gallbladder. No bile duct dilatation.    Abdominal solid organs otherwise unremarkable.    There is wall thickening of the ascending colon and distal rectum with no inflammatory  change.    No bowel obstruction, pneumoperitoneum, or pneumatosis.    Minimal upper abdominal ascites.    Pelvic contents unremarkable. Normal appendix.    Prostatectomy. Urinary bladder is partially decompressed and mildly thick-walled as a result. Small right superior urinary bladder diverticulum.    Grade 1 chronic dyspneic L5-S1 spondylolisthesis.    Moderate to severe lumbar degenerative spondylosis. No acute fracture.    Generalized edema, especially in the subcutaneous tissues.      Impression:      IMPRESSION:    1. 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    2. Edematous gallbladder is likely secondary to 3rd spacing of fluid rather than cholecystitis.    3. Thick-walled ascending colon and distal rectum are indeterminate. Correlate with any signs or symptoms of colitis.    4. Additional findings as above.    Authenticated and Electronically Signed by Ventura Obando MD  on 04/07/2025 09:55:20 PM    XR Chest 1 View [[id number]] Collected: 04/07/25 1909     Updated: 04/07/25 1910    Narrative:      FINAL REPORT    TECHNIQUE:  null    CLINICAL HISTORY:  SOA    COMPARISON:  null    FINDINGS:  Single view of the chest.    COMPARISON: None    FINDINGS:    Status post sternotomy.    Cardiomegaly. Atherosclerotic thoracic aorta. Mild prominence of the central pulmonary vasculature.    Blunting of the right costophrenic angle. No pneumothorax.    No consolidation.    No fracture identified. Mild spondylosis.      Impression:      IMPRESSION:    1. Small right pleural effusion.    2. Cardiomegaly with likely mild pulmonary vascular congestion.    Authenticated and Electronically Signed by Jessee Scott MD on  04/07/2025 07:09:41 PM            Echo:    Results for orders placed during the hospital encounter of 12/21/24    Adult Transthoracic Echo Complete W/ Cont if Necessary Per Protocol    Interpretation Summary    Left ventricular systolic function is moderately decreased. Calculated left  "ventricular EF = 38.2% Left ventricular ejection fraction appears to be 36 - 40%.    The left ventricular cavity is mildly dilated.    Left ventricular diastolic dysfunction is noted.    Mildly reduced right ventricular systolic function noted.    The right ventricular cavity is mildly dilated.    The left atrial cavity is dilated.    The right atrial cavity is dilated.    Moderate aortic valve regurgitation is present.  Patient has a history of Ross procedure.    Moderate mitral valve regurgitation is present.    Estimated right ventricular systolic pressure from tricuspid regurgitation is normal (<35 mmHg).    Condition on Discharge:      Stable.    Code status during the hospital stay:    Code Status and Medical Interventions: No CPR (Do Not Attempt to Resuscitate); Limited Support; No intubation (DNI)   Ordered at: 04/08/25 0054     Code Status (Patient has no pulse and is not breathing):    No CPR (Do Not Attempt to Resuscitate)     Medical Interventions (Patient has pulse or is breathing):    Limited Support     Medical Intervention Limits:    No intubation (DNI)     Discharge Disposition:    Hospice/Medical Facility (DC - External)    Discharge Medications:       Discharge Medications        New Medications        Instructions Start Date   hydrOXYzine pamoate 50 MG capsule  Commonly known as: VISTARIL   50 mg, Oral, 3 Times Daily PRN      midodrine 5 MG tablet  Commonly known as: PROAMATINE   5 mg, Oral, 3 Times Daily Before Meals      traZODone 50 MG tablet  Commonly known as: DESYREL   50 mg, Oral, Nightly             Continue These Medications        Instructions Start Date   amiodarone 200 MG tablet  Commonly known as: PACERONE   200 mg, Daily      apixaban 2.5 MG tablet tablet  Commonly known as: ELIQUIS   2.5 mg, Every 12 Hours Scheduled      Droplet Insulin Syringe 31G X 5/16\" 0.5 ML misc  Generic drug: Insulin Syringe-Needle U-100       Lantus 100 UNIT/ML injection  Generic drug: insulin glargine   " Inject up to 20 units daily subcutaneously      metoprolol succinate XL 25 MG 24 hr tablet  Commonly known as: TOPROL-XL   25 mg, Oral, Daily             Stop These Medications      allopurinol 100 MG tablet  Commonly known as: ZYLOPRIM     ascorbic acid 1000 MG tablet  Commonly known as: VITAMIN C     Calcium Carb-Cholecalciferol 600-5 MG-MCG tablet     CINNAMON PO     empagliflozin 25 MG tablet tablet  Commonly known as: Jardiance     Entresto 24-26 MG tablet  Generic drug: sacubitril-valsartan     fluticasone 50 MCG/ACT nasal spray  Commonly known as: Flonase     furosemide 40 MG tablet  Commonly known as: LASIX     pravastatin 40 MG tablet  Commonly known as: PRAVACHOL     PREVAGEN PO     True Metrix Blood Glucose Test test strip  Generic drug: glucose blood     VITAMIN D (CHOLECALCIFEROL) PO     Xtandi 40 MG tablet tablet  Generic drug: enzalutamide            Discharge Diet:     Diet Instructions       Diet: Cardiac Diets; Healthy Heart (2-3 Na+); Regular (IDDSI 7); Thin (IDDSI 0)      Discharge Diet: Cardiac Diets    Cardiac Diet: Healthy Heart (2-3 Na+)    Texture: Regular (IDDSI 7)    Fluid Consistency: Thin (IDDSI 0)    On hospice, he may eat as he pleases          Activity at Discharge:     Activity Instructions       Activity as Tolerated            Follow-up Appointments:     Contact information for follow-up providers       David Em MD .    Specialty: Internal Medicine  Contact information:  [address]  [facility] [address] [phone]                       Contact information for after-discharge care       Destination       [facility] .    Service: Nursing Home  Contact information:  [address] [facility] [address] [phone]                     Durable Medical Equipment       [facility] .    Service: Oxygen Equipment and Accessories  Contact information:  [address] [facility] [address]  [facility] [address] [phone]                                  Future Appointments   Date Time Provider Department Center   4/24/2025  4:00 PM David Em MD [facility] MR [facility]   5/22/2025  1:30 PM Sophia Ferreira PA [facility]     Test Results Pending at Discharge:    Pending Results       None                 Bradian Joseph Kerley,   04/23/25  10:21 EDT    Time: I spent 35 minutes on this discharge activity which included: face-to-face encounter with the patient, reviewing the data in the system, coordination of the care with the nursing staff as well as consultants, documentation, and entering orders.     Dictated utilizing Dragon dictation.    Copied text reviewed and accurate as of today.

## 2025-04-23 NOTE — NURSING NOTE
Pt refusing skin assessment and wound care. Refusing pm insulins. Patient A&O x4. Patient educated on risks, safety, infection.

## 2025-04-23 NOTE — PLAN OF CARE
Goal Outcome Evaluation: Patient being discharged to [facility] via EMS

## 2025-04-23 NOTE — PLAN OF CARE
Problem: Adult Inpatient Plan of Care  Goal: Absence of Hospital-Acquired Illness or Injury  Intervention: Identify and Manage Fall Risk  Recent Flowsheet Documentation  Taken 4/23/2025 0423 by Roya Gray RN  Safety Promotion/Fall Prevention:   activity supervised   assistive device/personal items within reach   clutter free environment maintained   fall prevention program maintained   room organization consistent   safety round/check completed  Taken 4/23/2025 0218 by Roya Gray RN  Safety Promotion/Fall Prevention:   activity supervised   assistive device/personal items within reach   clutter free environment maintained   fall prevention program maintained   room organization consistent   safety round/check completed  Taken 4/23/2025 0018 by Roya Gray RN  Safety Promotion/Fall Prevention:   activity supervised   assistive device/personal items within reach   clutter free environment maintained   fall prevention program maintained   room organization consistent   safety round/check completed  Taken 4/22/2025 2236 by Roya Gray RN  Safety Promotion/Fall Prevention:   activity supervised   assistive device/personal items within reach   clutter free environment maintained   fall prevention program maintained   room organization consistent   safety round/check completed  Taken 4/22/2025 2156 by Roya Gray RN  Safety Promotion/Fall Prevention:   activity supervised   assistive device/personal items within reach   clutter free environment maintained   fall prevention program maintained   room organization consistent   safety round/check completed  Taken 4/22/2025 2020 by Roya Gray RN  Safety Promotion/Fall Prevention:   activity supervised   assistive device/personal items within reach   clutter free environment maintained   fall prevention program maintained   room organization consistent   safety round/check completed  Intervention: Prevent Skin Injury  Recent Flowsheet Documentation  Taken  4/23/2025 0423 by Roya Gray RN  Body Position:   sitting up in bed   tilted   heels elevated   weight shifting   right  Taken 4/23/2025 0218 by Roya Gray RN  Body Position:   sitting up in bed   heels elevated   neutral head position   neutral body alignment   position changed independently  Taken 4/23/2025 0018 by Roya Gray RN  Body Position:   sitting up in bed   tilted   heels elevated   right   position changed independently  Taken 4/22/2025 2236 by Roya Gray RN  Body Position:   sitting up in bed   tilted   heels elevated   weight shifting   left  Taken 4/22/2025 2156 by Roya Gray RN  Body Position:   sitting up in bed   heels elevated   weight shifting   neutral head position   neutral body alignment  Skin Protection:   incontinence pads utilized   pulse oximeter probe site changed   skin sealant/moisture barrier applied  Taken 4/22/2025 2020 by Roya Gray RN  Body Position:   sitting up in bed   tilted   heels elevated   weight shifting   right  Intervention: Prevent and Manage VTE (Venous Thromboembolism) Risk  Recent Flowsheet Documentation  Taken 4/22/2025 2156 by Roya Gray RN  VTE Prevention/Management:   patient refused intervention   SCDs (sequential compression devices) off  Intervention: Prevent Infection  Recent Flowsheet Documentation  Taken 4/23/2025 0423 by Roya Gray RN  Infection Prevention:   environmental surveillance performed   single patient room provided  Taken 4/23/2025 0218 by Roya Gray RN  Infection Prevention:   environmental surveillance performed   single patient room provided  Taken 4/23/2025 0018 by Roya Gray RN  Infection Prevention:   environmental surveillance performed   single patient room provided  Taken 4/22/2025 2236 by Roya Gray RN  Infection Prevention:   environmental surveillance performed   single patient room provided  Taken 4/22/2025 2156 by Roya Gray RN  Infection Prevention:   environmental surveillance performed    single patient room provided  Taken 4/22/2025 2020 by Roya Gray RN  Infection Prevention:   environmental surveillance performed   single patient room provided  Goal: Optimal Comfort and Wellbeing  Intervention: Provide Person-Centered Care  Recent Flowsheet Documentation  Taken 4/22/2025 2156 by Roya Gray RN  Trust Relationship/Rapport:   care explained   thoughts/feelings acknowledged   questions encouraged   questions answered   reassurance provided  Goal: Plan of Care Review  Recent Flowsheet Documentation  Taken 4/23/2025 0430 by Roya Gray RN  Progress: no change  Outcome Evaluation: Patient currently asleep. Anticipating possible discharge today. Vistaril requested last night before going to sleep, see MAR. Pt refused skin assessment of backside, wound care, and insulin coverage during shift, pt educated. Had a bowel movement during shift.  Plan of Care Reviewed With: patient  Goal: Absence of Hospital-Acquired Illness or Injury  Intervention: Identify and Manage Fall Risk  Recent Flowsheet Documentation  Taken 4/23/2025 0423 by Roya Gray RN  Safety Promotion/Fall Prevention:   activity supervised   assistive device/personal items within reach   clutter free environment maintained   fall prevention program maintained   room organization consistent   safety round/check completed  Taken 4/23/2025 0218 by Roya Gray RN  Safety Promotion/Fall Prevention:   activity supervised   assistive device/personal items within reach   clutter free environment maintained   fall prevention program maintained   room organization consistent   safety round/check completed  Taken 4/23/2025 0018 by Roya Gray RN  Safety Promotion/Fall Prevention:   activity supervised   assistive device/personal items within reach   clutter free environment maintained   fall prevention program maintained   room organization consistent   safety round/check completed  Taken 4/22/2025 2236 by Roya Gray RN  Safety  Promotion/Fall Prevention:   activity supervised   assistive device/personal items within reach   clutter free environment maintained   fall prevention program maintained   room organization consistent   safety round/check completed  Taken 4/22/2025 2156 by Roya Gray RN  Safety Promotion/Fall Prevention:   activity supervised   assistive device/personal items within reach   clutter free environment maintained   fall prevention program maintained   room organization consistent   safety round/check completed  Taken 4/22/2025 2020 by Roya Gray RN  Safety Promotion/Fall Prevention:   activity supervised   assistive device/personal items within reach   clutter free environment maintained   fall prevention program maintained   room organization consistent   safety round/check completed  Intervention: Prevent Skin Injury  Recent Flowsheet Documentation  Taken 4/23/2025 0423 by Roya Gray RN  Body Position:   sitting up in bed   tilted   heels elevated   weight shifting   right  Taken 4/23/2025 0218 by Roya Gray RN  Body Position:   sitting up in bed   heels elevated   neutral head position   neutral body alignment   position changed independently  Taken 4/23/2025 0018 by Roya Gray RN  Body Position:   sitting up in bed   tilted   heels elevated   right   position changed independently  Taken 4/22/2025 2236 by Roya Gray RN  Body Position:   sitting up in bed   tilted   heels elevated   weight shifting   left  Taken 4/22/2025 2156 by Roya Gray RN  Body Position:   sitting up in bed   heels elevated   weight shifting   neutral head position   neutral body alignment  Skin Protection:   incontinence pads utilized   pulse oximeter probe site changed   skin sealant/moisture barrier applied  Taken 4/22/2025 2020 by Roya Gray RN  Body Position:   sitting up in bed   tilted   heels elevated   weight shifting   right  Intervention: Prevent and Manage VTE (Venous Thromboembolism) Risk  Recent Flowsheet  Documentation  Taken 4/22/2025 2156 by Roya Gray RN  VTE Prevention/Management:   patient refused intervention   SCDs (sequential compression devices) off  Intervention: Prevent Infection  Recent Flowsheet Documentation  Taken 4/23/2025 0423 by Roya Gray RN  Infection Prevention:   environmental surveillance performed   single patient room provided  Taken 4/23/2025 0218 by Roya Gray RN  Infection Prevention:   environmental surveillance performed   single patient room provided  Taken 4/23/2025 0018 by Roya Gray RN  Infection Prevention:   environmental surveillance performed   single patient room provided  Taken 4/22/2025 2236 by Roya Gray RN  Infection Prevention:   environmental surveillance performed   single patient room provided  Taken 4/22/2025 2156 by Roya Gray RN  Infection Prevention:   environmental surveillance performed   single patient room provided  Taken 4/22/2025 2020 by Roya Gray RN  Infection Prevention:   environmental surveillance performed   single patient room provided  Goal: Optimal Comfort and Wellbeing  Intervention: Provide Person-Centered Care  Recent Flowsheet Documentation  Taken 4/22/2025 2156 by Roya Gray RN  Trust Relationship/Rapport:   care explained   thoughts/feelings acknowledged   questions encouraged   questions answered   reassurance provided     Problem: Comorbidity Management  Goal: Maintenance of Heart Failure Symptom Control  Intervention: Maintain Heart Failure Management  Recent Flowsheet Documentation  Taken 4/22/2025 2156 by Roya Gray RN  Medication Review/Management: medications reviewed     Problem: Skin Injury Risk Increased  Goal: Skin Health and Integrity  Intervention: Optimize Skin Protection  Recent Flowsheet Documentation  Taken 4/23/2025 0423 by Roya Gray RN  Activity Management: (Rest and repositioning encouraged) other (see comments)  Head of Bed (HOB) Positioning: HOB at 30 degrees  Taken 4/23/2025 0218 by Isaac  LORENA Pryor  Activity Management: (Rest and repositioning encouraged) other (see comments)  Head of Bed (HOB) Positioning: HOB at 30 degrees  Taken 4/23/2025 0018 by Roya Gray RN  Activity Management: (Rest and repositioning encouraged) other (see comments)  Head of Bed (HOB) Positioning: HOB at 30 degrees  Taken 4/22/2025 2236 by Roya Gray RN  Activity Management:   activity encouraged   up to bedside commode  Head of Bed (HOB) Positioning: HOB at 30 degrees  Taken 4/22/2025 2156 by Roya Gray RN  Activity Management: activity encouraged  Pressure Reduction Techniques:   frequent weight shift encouraged   heels elevated off bed   pressure points protected   positioned off wounds   weight shift assistance provided  Head of Bed (HOB) Positioning: HOB at 60-90 degrees  Pressure Reduction Devices:   positioning supports utilized   heel offloading device utilized  Skin Protection:   incontinence pads utilized   pulse oximeter probe site changed   skin sealant/moisture barrier applied  Taken 4/22/2025 2020 by Roya Gray RN  Activity Management: activity encouraged  Head of Bed (HOB) Positioning: HOB at 30 degrees     Problem: Heart Failure  Goal: Effective Oxygenation and Ventilation  Intervention: Promote Airway Secretion Clearance  Recent Flowsheet Documentation  Taken 4/23/2025 0423 by Roya Gray RN  Activity Management: (Rest and repositioning encouraged) other (see comments)  Cough And Deep Breathing: done independently per patient  Taken 4/23/2025 0218 by Roya Gray RN  Activity Management: (Rest and repositioning encouraged) other (see comments)  Taken 4/23/2025 0018 by Roya Gray RN  Activity Management: (Rest and repositioning encouraged) other (see comments)  Cough And Deep Breathing: done independently per patient  Taken 4/22/2025 2236 by Roya Gray RN  Activity Management:   activity encouraged   up to bedside commode  Taken 4/22/2025 2156 by Roya Gray RN  Activity Management:  activity encouraged  Cough And Deep Breathing: done with encouragement  Taken 4/22/2025 2020 by Roya Gray RN  Activity Management: activity encouraged  Intervention: Optimize Oxygenation and Ventilation  Recent Flowsheet Documentation  Taken 4/23/2025 0423 by Roya Gray RN  Head of Bed ([facility]) Positioning: HOB at 30 degrees  Taken 4/23/2025 0218 by Roya Gray RN  Head of Bed ([facility]) Positioning: HOB at 30 degrees  Taken 4/23/2025 0018 by Roya Gray RN  Head of Bed ([facility]) Positioning: HOB at 30 degrees  Taken 4/22/2025 2236 by Roya Gray RN  Head of Bed ([facility]) Positioning: HOB at 30 degrees  Taken 4/22/2025 2156 by Roya Gray RN  Head of Bed ([facility]) Positioning: HOB at 60-90 degrees  Airway/Ventilation Management: oxygen therapy provided  Taken 4/22/2025 2020 by Roya Gray RN  Head of Bed ([facility]) Positioning: HOB at 30 degrees  Goal: Effective Breathing Pattern During Sleep  Intervention: Monitor and Manage Obstructive Sleep Apnea  Recent Flowsheet Documentation  Taken 4/22/2025 2156 by Roya Gray RN  Medication Review/Management: medications reviewed     Problem: Fall Injury Risk  Goal: Absence of Fall and Fall-Related Injury  Intervention: Identify and Manage Contributors  Recent Flowsheet Documentation  Taken 4/22/2025 2156 by Roya Gray RN  Medication Review/Management: medications reviewed  Self-Care Promotion:   independence encouraged   BADL personal objects within reach   BADL personal routines maintained  Intervention: Promote Injury-Free Environment  Recent Flowsheet Documentation  Taken 4/23/2025 0423 by Roya Gray RN  Safety Promotion/Fall Prevention:   activity supervised   assistive device/personal items within reach   clutter free environment maintained   fall prevention program maintained   room organization consistent   safety round/check completed  Taken 4/23/2025 0218 by Roya Gray RN  Safety Promotion/Fall Prevention:   activity supervised   assistive device/personal items  within reach   clutter free environment maintained   fall prevention program maintained   room organization consistent   safety round/check completed  Taken 4/23/2025 0018 by Roya Gray RN  Safety Promotion/Fall Prevention:   activity supervised   assistive device/personal items within reach   clutter free environment maintained   fall prevention program maintained   room organization consistent   safety round/check completed  Taken 4/22/2025 2236 by Roya Gray RN  Safety Promotion/Fall Prevention:   activity supervised   assistive device/personal items within reach   clutter free environment maintained   fall prevention program maintained   room organization consistent   safety round/check completed  Taken 4/22/2025 2156 by Roya Gray RN  Safety Promotion/Fall Prevention:   activity supervised   assistive device/personal items within reach   clutter free environment maintained   fall prevention program maintained   room organization consistent   safety round/check completed  Taken 4/22/2025 2020 by Roya Gray RN  Safety Promotion/Fall Prevention:   activity supervised   assistive device/personal items within reach   clutter free environment maintained   fall prevention program maintained   room organization consistent   safety round/check completed  Goal: Absence of Fall and Fall-Related Injury  Intervention: Identify and Manage Contributors  Recent Flowsheet Documentation  Taken 4/22/2025 2156 by Roya Gray RN  Medication Review/Management: medications reviewed  Self-Care Promotion:   independence encouraged   BADL personal objects within reach   BADL personal routines maintained  Intervention: Promote Injury-Free Environment  Recent Flowsheet Documentation  Taken 4/23/2025 0423 by Roya Gray RN  Safety Promotion/Fall Prevention:   activity supervised   assistive device/personal items within reach   clutter free environment maintained   fall prevention program maintained   room organization  consistent   safety round/check completed  Taken 4/23/2025 0218 by Roya Gray RN  Safety Promotion/Fall Prevention:   activity supervised   assistive device/personal items within reach   clutter free environment maintained   fall prevention program maintained   room organization consistent   safety round/check completed  Taken 4/23/2025 0018 by Roya Gray RN  Safety Promotion/Fall Prevention:   activity supervised   assistive device/personal items within reach   clutter free environment maintained   fall prevention program maintained   room organization consistent   safety round/check completed  Taken 4/22/2025 2236 by Roya Gray RN  Safety Promotion/Fall Prevention:   activity supervised   assistive device/personal items within reach   clutter free environment maintained   fall prevention program maintained   room organization consistent   safety round/check completed  Taken 4/22/2025 2156 by Roya Gray RN  Safety Promotion/Fall Prevention:   activity supervised   assistive device/personal items within reach   clutter free environment maintained   fall prevention program maintained   room organization consistent   safety round/check completed  Taken 4/22/2025 2020 by Roya Gray RN  Safety Promotion/Fall Prevention:   activity supervised   assistive device/personal items within reach   clutter free environment maintained   fall prevention program maintained   room organization consistent   safety round/check completed     Problem: Gas Exchange Impaired  Goal: Optimal Gas Exchange  Intervention: Optimize Oxygenation and Ventilation  Recent Flowsheet Documentation  Taken 4/23/2025 0423 by Roya Gray RN  Head of Bed (HOB) Positioning: HOB at 30 degrees  Taken 4/23/2025 0218 by Roya Gray RN  Head of Bed (HOB) Positioning: HOB at 30 degrees  Taken 4/23/2025 0018 by Roya Gray RN  Head of Bed (HOB) Positioning: HOB at 30 degrees  Taken 4/22/2025 2236 by Roya Gray RN  Head of Bed ([facility])  Positioning: HOB at 30 degrees  Taken 4/22/2025 2156 by Roya Gray, RN  Head of Bed ([facility]) Positioning: HOB at 60-90 degrees  Airway/Ventilation Management: oxygen therapy provided  Taken 4/22/2025 2020 by Roya Gray, RN  Head of Bed ([facility]) Positioning: HOB at 30 degrees   Goal Outcome Evaluation:  Plan of Care Reviewed With: patient        Progress: no change  Outcome Evaluation: Patient currently asleep. Anticipating possible discharge today. Vistaril requested last night before going to sleep, see MAR. Pt refused skin assessment of backside, wound care, and insulin coverage during shift, pt educated. Had a bowel movement during shift.

## 2025-04-23 NOTE — CASE MANAGEMENT/SOCIAL WORK
Case Management Discharge Note                Selected Continued Care - Admitted Since 4/7/2025       Destination Coordination complete.      Service Provider Services Address Phone Fax Patient Preferred    [facility] [address] [place] [address] [phone] --              Durable Medical Equipment Coordination complete.      Service Provider Services Address Phone Fax Patient Preferred    [facility] [address] [place] [address] [phone] --       Internal Comment last updated by Gina Molina, RN 4/8/2025 1128    Current O2 supplier, 2L continuous                         Dialysis/Infusion    No services have been selected for the patient.                Home Medical Care    No services have been selected for the patient.                Therapy    No services have been selected for the patient.                Community Resources    No services have been selected for the patient.                Community & DME    No services have been selected for the patient.                    Selected Continued Care - Episodes Includes continued care and service providers with selected services from the active episodes listed below          Selected Continued Care - Prior Encounters Includes continued care and service providers with selected services from prior encounters from 1/7/2025 to 4/23/2025      Discharged on 2/6/2025 Admission date: 2/3/2025 - Discharge disposition: Home or Self Care      Home Medical Care       Service Provider Services Address Phone Fax Patient Preferred    [facility] [address], [address] [address] [phone] --                          Transportation Services  Ambulance: [facility]    Final Discharge Disposition Code: 04 - intermediate care facility

## 2025-04-23 NOTE — PLAN OF CARE
Goal Outcome Evaluation:         Palliative Care Team visited pt this am.  Pt sleeping with CPAP on at that time.  Planned DC to LTC, [facility], with Hospice referral today.  [facility] contacted yesterday.      1512     Pt awaiting transport via [facility] at this time.   [facility] contacted re: referral and delay of pt transport due to [facility] backup for transfers.   Inquired if DC summary should be faxed to [facility].  It was requested to be faxed to: [facility] [phone]  DC summary, H&P, Palliaitve Care initial consult and last progress note faxed to:  [facility] [phone]    Received call from Jimena with [facility] re: referral. Brief summary of pt's admission and request for Hospice services at [facility] provided.  She confirmed  being his daughter, Susana.  She plans to contact Katya to schedule meeting to [facility] pt tomorrow am.     1733    Pt still awaiting EMS transport to [facility]      1736  Pt discharged to [facility] via [facility] for LTC with Hospice referral 4/23/25 @ 1732

## 2025-04-23 NOTE — CASE MANAGEMENT/SOCIAL WORK
Case Management/Social Work    Patient Name:  Blane Dietz  YOB: 1935  MRN: [id number]  Admit Date:  4/7/2025        HARPAL spoke with [place] who states pt can admit today. Pt will be going LTC private pay under hospice. PC team spoke with hospice already. HARPAL updated team.     15:22 EDT there has been EMS delay. Facility states can accept later admission.       Electronically signed by:  LYNDSAY Bone  04/23/25 08:59 EDT

## 2025-04-24 ENCOUNTER — SPECIALTY PHARMACY (OUTPATIENT)
Facility: HOSPITAL | Age: 89
End: 2025-04-24
Payer: MEDICARE

## 2025-05-08 ENCOUNTER — TELEPHONE (OUTPATIENT)
Dept: ONCOLOGY | Facility: CLINIC | Age: 89
End: 2025-05-08
Payer: MEDICARE

## 2025-05-08 NOTE — TELEPHONE ENCOUNTER
Susana called on behalf of pt. She would like to speak with the nurse. She would not give details on what about.    Please reach out to her at [phone]

## 2025-05-09 NOTE — TELEPHONE ENCOUNTER
Returned call and patient's daughter Susana reported that Mr. Dietz passed away in his sleep yesterday morning 5/8/2025. Message relayed to Dr. Ramon.

## (undated) DEVICE — SUT SILK 2/0 30IN A305H

## (undated) DEVICE — JP PERF DRN SIL FLT 10MM FULL: Brand: CARDINAL HEALTH

## (undated) DEVICE — GLV SURG SENSICARE PI MIC PF SZ7.5 LF STRL

## (undated) DEVICE — SUT SILK 2/0 PS 18IN 1588H

## (undated) DEVICE — SUT PDS 1 CTX 36IN VIO PDP371T

## (undated) DEVICE — CATH FOL COUDE TIEMAN 2WY 16F 5CC

## (undated) DEVICE — SUT SILK 1 SUTUPAK 30IN SA87G

## (undated) DEVICE — VESSEL SEALER EXTEND: Brand: ENDOWRIST

## (undated) DEVICE — ST TBG CONN PNEUMOCLEAR EVAC SMOKE HEAT/HUMID

## (undated) DEVICE — GLV SURG SIGNATURE TOUCH PF LTX 8 STRL BX/50

## (undated) DEVICE — BLADELESS OBTURATOR: Brand: WECK VISTA

## (undated) DEVICE — COLUMN DRAPE

## (undated) DEVICE — ENDOPATH XCEL BLADELESS TROCARS WITH STABILITY SLEEVES: Brand: ENDOPATH XCEL

## (undated) DEVICE — ARM DRAPE

## (undated) DEVICE — TIP COVER ACCESSORY

## (undated) DEVICE — SUT SILK 0 TIES 30IN A306H

## (undated) DEVICE — BLANKT WARM UPPR/BDY ARM/OUT 57X196CM

## (undated) DEVICE — SUT VIC 12X27 D8116 BX/12

## (undated) DEVICE — PATIENT RETURN ELECTRODE, SINGLE-USE, CONTACT QUALITY MONITORING, ADULT, WITH 9FT CORD, FOR PATIENTS WEIGING OVER 33LBS. (15KG): Brand: MEGADYNE

## (undated) DEVICE — ANTIBACTERIAL UNDYED BRAIDED (POLYGLACTIN 910), SYNTHETIC ABSORBABLE SUTURE: Brand: COATED VICRYL

## (undated) DEVICE — SPNG LAP PREWSH SFTPK 18X18IN STRL PK/5

## (undated) DEVICE — PK UROL DAVINCI 10

## (undated) DEVICE — CANNULA SEAL

## (undated) DEVICE — GOWN,NON-REINFORCED,SIRUS,SET IN SLV,XL: Brand: MEDLINE

## (undated) DEVICE — SUT VIC 0 UR6 27IN VCP603H

## (undated) DEVICE — ADHS SKIN PREMIERPRO EXOFIN TOPICAL HI/VISC .5ML

## (undated) DEVICE — DRSNG WND BORDR/ADHS NONADHR/GZ LF 2X2IN STRL

## (undated) DEVICE — JACKSON-PRATT 100CC BULB RESERVOIR: Brand: CARDINAL HEALTH

## (undated) DEVICE — ECHELON FLEX POWERED PLUS LONG ARTICULATING ENDOSCOPIC LINEAR CUTTER, 60MM: Brand: ECHELON FLEX

## (undated) DEVICE — 3M™ DURAPORE™ SURGICAL TAPE 1538-3, 3 INCH X 10 YARD (7,5CM X 9,1M), 4 ROLLS/BOX: Brand: 3M™ DURAPORE™